# Patient Record
Sex: MALE | Race: WHITE | NOT HISPANIC OR LATINO | Employment: OTHER | URBAN - METROPOLITAN AREA
[De-identification: names, ages, dates, MRNs, and addresses within clinical notes are randomized per-mention and may not be internally consistent; named-entity substitution may affect disease eponyms.]

---

## 2017-01-25 RX ORDER — ATENOLOL 50 MG/1
TABLET ORAL
Qty: 180 TABLET | Refills: 0 | Status: SHIPPED | OUTPATIENT
Start: 2017-01-25 | End: 2017-01-30 | Stop reason: SDUPTHER

## 2017-01-30 ENCOUNTER — OFFICE VISIT (OUTPATIENT)
Dept: FAMILY MEDICINE CLINIC | Facility: CLINIC | Age: 72
End: 2017-01-30

## 2017-01-30 VITALS
BODY MASS INDEX: 38.73 KG/M2 | HEART RATE: 52 BPM | TEMPERATURE: 98.1 F | HEIGHT: 73 IN | DIASTOLIC BLOOD PRESSURE: 70 MMHG | SYSTOLIC BLOOD PRESSURE: 120 MMHG | WEIGHT: 292.2 LBS

## 2017-01-30 DIAGNOSIS — I65.21 CAROTID STENOSIS, ASYMPTOMATIC, RIGHT: ICD-10-CM

## 2017-01-30 DIAGNOSIS — I35.0 AORTIC STENOSIS, MODERATE: Primary | ICD-10-CM

## 2017-01-30 DIAGNOSIS — I10 ESSENTIAL HYPERTENSION: ICD-10-CM

## 2017-01-30 DIAGNOSIS — E11.8 TYPE 2 DIABETES MELLITUS WITH COMPLICATION, WITHOUT LONG-TERM CURRENT USE OF INSULIN (HCC): ICD-10-CM

## 2017-01-30 PROBLEM — I65.29 CAROTID STENOSIS, ASYMPTOMATIC: Status: ACTIVE | Noted: 2017-01-30

## 2017-01-30 PROCEDURE — 99213 OFFICE O/P EST LOW 20 MIN: CPT | Performed by: FAMILY MEDICINE

## 2017-01-30 RX ORDER — GLIPIZIDE 5 MG/1
TABLET ORAL
Qty: 270 TABLET | Refills: 0 | Status: SHIPPED | OUTPATIENT
Start: 2017-01-30 | End: 2017-06-19 | Stop reason: SDUPTHER

## 2017-01-30 RX ORDER — ATORVASTATIN CALCIUM 40 MG/1
40 TABLET, FILM COATED ORAL DAILY
Qty: 90 TABLET | Refills: 0 | Status: SHIPPED | OUTPATIENT
Start: 2017-01-30 | End: 2017-06-19 | Stop reason: SDUPTHER

## 2017-01-30 RX ORDER — LISINOPRIL 2.5 MG/1
2.5 TABLET ORAL DAILY
Qty: 90 TABLET | Refills: 0 | Status: SHIPPED | OUTPATIENT
Start: 2017-01-30 | End: 2017-06-19 | Stop reason: SDUPTHER

## 2017-01-30 RX ORDER — METFORMIN HYDROCHLORIDE 750 MG/1
750 TABLET, EXTENDED RELEASE ORAL 2 TIMES DAILY
Qty: 180 TABLET | Refills: 0 | Status: SHIPPED | OUTPATIENT
Start: 2017-01-30 | End: 2017-06-19 | Stop reason: SDUPTHER

## 2017-01-30 RX ORDER — TRIAMTERENE AND HYDROCHLOROTHIAZIDE 37.5; 25 MG/1; MG/1
2 CAPSULE ORAL EVERY MORNING
Qty: 180 CAPSULE | Refills: 0 | Status: SHIPPED | OUTPATIENT
Start: 2017-01-30 | End: 2017-06-19 | Stop reason: SDUPTHER

## 2017-01-30 RX ORDER — CLOPIDOGREL BISULFATE 75 MG/1
75 TABLET ORAL DAILY
Qty: 90 TABLET | Refills: 0 | Status: SHIPPED | OUTPATIENT
Start: 2017-01-30 | End: 2017-06-19 | Stop reason: SDUPTHER

## 2017-01-30 RX ORDER — ATENOLOL 50 MG/1
50 TABLET ORAL 2 TIMES DAILY
Qty: 180 TABLET | Refills: 0 | Status: SHIPPED | OUTPATIENT
Start: 2017-01-30 | End: 2017-07-10 | Stop reason: SDUPTHER

## 2017-01-30 NOTE — PROGRESS NOTES
Chief Complaint   Patient presents with   • Diabetes     follow up       Subjective.../HPI  Patient present today with dm. Has seen dr mcknight and goes back in march for fluid on lungs. Had thoracentesis and removed abut 2 liters.    I have reviewed the patient's medical history in detail and updated the computerized patient record.    Family History   Problem Relation Age of Onset   • Hypertension Father        Social History     Social History   • Marital status:      Spouse name: N/A   • Number of children: N/A   • Years of education: N/A     Occupational History   • Not on file.     Social History Main Topics   • Smoking status: Never Smoker   • Smokeless tobacco: Never Used   • Alcohol use Yes      Comment: socially   • Drug use: No   • Sexual activity: Not on file     Other Topics Concern   • Not on file     Social History Narrative       Review of Systems:   Review of Systems   Constitutional: Negative for chills, fatigue, fever and unexpected weight change.   HENT: Negative for ear pain, hearing loss, sinus pressure, sore throat and tinnitus.    Eyes: Negative for pain, discharge and redness.   Respiratory: Negative for cough, shortness of breath and wheezing.    Cardiovascular: Negative for chest pain, palpitations and leg swelling.   Gastrointestinal: Negative for abdominal pain, constipation, diarrhea and nausea.   Endocrine: Negative for cold intolerance and heat intolerance.   Genitourinary: Negative for difficulty urinating, flank pain and urgency.   Musculoskeletal: Negative for back pain, joint swelling and myalgias.   Skin: Negative for rash and wound.   Allergic/Immunologic: Negative for environmental allergies and food allergies.   Neurological: Negative for dizziness, seizures, numbness and headaches.   Hematological: Negative for adenopathy. Does not bruise/bleed easily.   Psychiatric/Behavioral: Negative for decreased concentration, dysphoric mood and sleep disturbance. The patient is  "not nervous/anxious.    All other systems reviewed and are negative.      Objective:  Vital Signs     Vitals:    01/30/17 1126 01/30/17 1209   BP: 144/64 120/70   BP Location: Left arm Left arm   Patient Position: Sitting Sitting   Cuff Size:  Large Adult   Pulse: 52    Temp: 98.1 °F (36.7 °C)    TempSrc: Oral    Weight: 292 lb 3.2 oz (133 kg)    Height: 73\" (185.4 cm)      Physical Exam   Constitutional: He is oriented to person, place, and time. He appears well-developed and well-nourished.   HENT:   Head: Normocephalic.   Eyes: Pupils are equal, round, and reactive to light.   Neck: Normal range of motion.   Right carotid bruit vs transmitted sound   Cardiovascular: Normal rate and regular rhythm.    Murmur (heart murmur 3/6) heard.  Pulmonary/Chest: Effort normal and breath sounds normal.   Abdominal: Soft. Bowel sounds are normal.   obesity   Musculoskeletal: Normal range of motion.   Neurological: He is alert and oriented to person, place, and time.   Skin: Skin is warm and dry.        Results Review:      REVIEWED AND DISCUSSED CLINICAL RESULTS WITH PATIENT                          Current Outpatient Prescriptions:   •  atenolol (TENORMIN) 50 MG tablet, Take 1 tablet by mouth 2 (Two) Times a Day., Disp: 180 tablet, Rfl: 0  •  atorvastatin (LIPITOR) 40 MG tablet, Take 1 tablet by mouth Daily., Disp: 90 tablet, Rfl: 0  •  clopidogrel (PLAVIX) 75 MG tablet, Take 1 tablet by mouth Daily., Disp: 90 tablet, Rfl: 0  •  glipiZIDE (GLUCOTROL) 5 MG tablet, 2 tabs in am and 1 at 4 pm, Disp: 270 tablet, Rfl: 0  •  glucose blood test strip, accu-check amanda plus test strips  Test twice daily, Disp: 200 each, Rfl: 1  •  lisinopril (PRINIVIL,ZESTRIL) 2.5 MG tablet, Take 1 tablet by mouth Daily., Disp: 90 tablet, Rfl: 0  •  metFORMIN ER (GLUCOPHAGE-XR) 750 MG 24 hr tablet, Take 1 tablet by mouth 2 (Two) Times a Day., Disp: 180 tablet, Rfl: 0  •  triamterene-hydrochlorothiazide (DYAZIDE) 37.5-25 MG per capsule, Take 2 " capsules by mouth Every Morning., Disp: 180 capsule, Rfl: 0  •  VENTOLIN  (90 BASE) MCG/ACT inhaler, , Disp: , Rfl:     Procedures    Assessment/Plan     Diagnoses and all orders for this visit:    Aortic stenosis, moderate    Carotid stenosis, asymptomatic, right    Essential hypertension    Type 2 diabetes mellitus with complication, without long-term current use of insulin    Other orders  -     VENTOLIN  (90 BASE) MCG/ACT inhaler;   -     atenolol (TENORMIN) 50 MG tablet; Take 1 tablet by mouth 2 (Two) Times a Day.  -     atorvastatin (LIPITOR) 40 MG tablet; Take 1 tablet by mouth Daily.  -     clopidogrel (PLAVIX) 75 MG tablet; Take 1 tablet by mouth Daily.  -     glipiZIDE (GLUCOTROL) 5 MG tablet; 2 tabs in am and 1 at 4 pm  -     lisinopril (PRINIVIL,ZESTRIL) 2.5 MG tablet; Take 1 tablet by mouth Daily.  -     metFORMIN ER (GLUCOPHAGE-XR) 750 MG 24 hr tablet; Take 1 tablet by mouth 2 (Two) Times a Day.  -     triamterene-hydrochlorothiazide (DYAZIDE) 37.5-25 MG per capsule; Take 2 capsules by mouth Every Morning.  -     glucose blood test strip; accu-check amanda plus test strips   Test twice daily         Martín Oliveira Jr., MD  01/30/17  12:24 PM

## 2017-01-30 NOTE — MR AVS SNAPSHOT
Rock Maciel   1/30/2017 11:10 AM   Office Visit    Dept Phone:  [phone]   Encounter #:  [id number]    Provider:  Martín Oliveira Jr., MD   Department:  [facility]                Your Full Care Plan              Today's Medication Changes          These changes are accurate as of: 1/30/17 12:31 PM.  If you have any questions, ask your nurse or doctor.               Medication(s)that have changed:     atenolol 50 MG tablet   Commonly known as:  TENORMIN   Take 1 tablet by mouth 2 (Two) Times a Day.   What changed:  See the new instructions.   Changed by:  Martín Oliveira Jr., MD         Stop taking medication(s)listed here:     allopurinol 300 MG tablet   Commonly known as:  ZYLOPRIM   Stopped by:  Martín Oliveira Jr., MD                Where to Get Your Medications      These medications were sent to [facility] - [place], [place] - [address] [place] [address] - [phone] [facility] [phone] [address] [address]     Phone:  [phone]     atenolol 50 MG tablet    atorvastatin 40 MG tablet    clopidogrel 75 MG tablet    glipiZIDE 5 MG tablet    lisinopril 2.5 MG tablet    metFORMIN  MG 24 hr tablet    triamterene-hydrochlorothiazide 37.5-25 MG per capsule         You can get these medications from any pharmacy     Bring a paper prescription for each of these medications     glucose blood test strip                  Your Updated Medication List          This list is accurate as of: 1/30/17 12:31 PM.  Always use your most recent med list.                atenolol 50 MG tablet   Commonly known as:  TENORMIN   Take 1 tablet by mouth 2 (Two) Times a Day.       atorvastatin 40 MG tablet   Commonly known as:  LIPITOR   Take 1 tablet by mouth Daily.       clopidogrel 75 MG tablet   Commonly known as:  PLAVIX   Take 1 tablet by mouth Daily.       glipiZIDE 5 MG tablet   Commonly known as:   GLUCOTROL   2 tabs in am and 1 at 4 pm       glucose blood test strip   accu-check amanda plus test strips  Test twice daily       lisinopril 2.5 MG tablet   Commonly known as:  PRINIVIL,ZESTRIL   Take 1 tablet by mouth Daily.       metFORMIN  MG 24 hr tablet   Commonly known as:  GLUCOPHAGE-XR   Take 1 tablet by mouth 2 (Two) Times a Day.       triamterene-hydrochlorothiazide 37.5-25 MG per capsule   Commonly known as:  DYAZIDE   Take 2 capsules by mouth Every Morning.       VENTOLIN  (90 BASE) MCG/ACT inhaler   Generic drug:  albuterol               You Were Diagnosed With        Codes Comments    Aortic stenosis, moderate    -  Primary ICD-10-CM: I35.0  ICD-9-CM: 424.1     Carotid stenosis, asymptomatic, right     ICD-10-CM: I65.21  ICD-9-CM: 433.10     Essential hypertension     ICD-10-CM: I10  ICD-9-CM: 401.9     Type 2 diabetes mellitus with complication, without long-term current use of insulin     ICD-10-CM: E11.8  ICD-9-CM: 250.90       Instructions     None    Patient Instructions History      Upcoming Appointments     Visit Type Date Time Department    OFFICE VISIT 2017 11:10 AM [facility]     [facility] ECHO 2D COMPLETE VT 2017 10:15 AM  [facility]    OFFICE VISIT 2017 11:00 AM [facility]    FOLLOW UP SLEEP CLINIC 2017  8:30 AM  [facility]      [facility] Signup     [facility] allows you to send messages to your doctor, view your test results, renew your prescriptions, schedule appointments, and more. To sign up, go to [employer] and click on the Sign Up Now link in the New User? box. Enter your [facility] [facility] Activation Code exactly as it appears below along with the last four digits of your Social Security Number and your Date of Birth () to complete the sign-up process. If you do not sign up before the expiration date, you must request a new code.    [facility] [facility] Activation Code: [email or web address]  Expires: 2017 12:31 PM    If you  "have questions, you can email [email or web address] or call [phone] to talk to our MyChart staff. Remember, [employer] is NOT to be used for urgent needs. For medical emergencies, dial 911.               Other Info from Your Visit           Your Appointments     May 02, 2017 10:15 AM EDT   ECHOCARDIOGRAM 2D COMPLETE VISIT with [facility]   [facility] ([place])    [address] [address] [phone]           Bring your insurance cards with you. Wear comfortable clothing and shoes.            May 02, 2017 11:00 AM EDT   Office Visit with Azam Banegas Jr., MD   [facility] (--)    [facility]. [address] [place] [address] [phone]           Arrive 15 minutes prior to appointment.            Aug 08, 2017  8:30 AM EDT   Follow Up Sleep Clinic with TOSHIA JIMENEZ SLEEP LAB PROVIDER SCHEDULE   [facility] ([place])    [address] [facility] [address] [phone]            1.  If you are currently on a CPAP or BiPAP please bring in your SD card or memory card.  2.  If you are experiencing problems with your current mask, please bring your mask with you to your appointment.                Allergies     Ceclor [Cefaclor] Allergy Rash      Reason for Visit     Diabetes follow up      Vital Signs     Blood Pressure Pulse Temperature Height Weight Body Mass Index    120/70 (BP Location: Left arm, Patient Position: Sitting, Cuff Size: Large Adult) 52 98.1 °F (36.7 °C) (Oral) 73\" (185.4 cm) 292 lb 3.2 oz (133 kg) 38.55 kg/m2    Smoking Status                   Never Smoker           Problems and Diagnoses Noted     Aortic stenosis, moderate    High blood pressure    Narrowing of artery in neck    Diabetes        "

## 2017-03-07 ENCOUNTER — TRANSCRIBE ORDERS (OUTPATIENT)
Dept: ADMINISTRATIVE | Facility: HOSPITAL | Age: 72
End: 2017-03-07

## 2017-03-07 DIAGNOSIS — J90 PLEURAL EFFUSION: Primary | ICD-10-CM

## 2017-03-14 ENCOUNTER — HOSPITAL ENCOUNTER (OUTPATIENT)
Dept: CT IMAGING | Facility: HOSPITAL | Age: 72
Discharge: HOME | End: 2017-03-14
Attending: INTERNAL MEDICINE | Admitting: INTERNAL MEDICINE

## 2017-03-14 ENCOUNTER — HOSPITAL ENCOUNTER (OUTPATIENT)
Dept: ULTRASOUND IMAGING | Facility: HOSPITAL | Age: 72
Discharge: HOME | End: 2017-03-14
Attending: INTERNAL MEDICINE

## 2017-03-14 VITALS
HEIGHT: 73 IN | BODY MASS INDEX: 37.77 KG/M2 | SYSTOLIC BLOOD PRESSURE: 108 MMHG | TEMPERATURE: 97 F | OXYGEN SATURATION: 99 % | DIASTOLIC BLOOD PRESSURE: 67 MMHG | HEART RATE: 52 BPM | RESPIRATION RATE: 18 BRPM | WEIGHT: 285 LBS

## 2017-03-14 DIAGNOSIS — J90 PLEURAL EFFUSION: ICD-10-CM

## 2017-03-14 LAB
APPEARANCE FLD: CLEAR
COLOR FLD: YELLOW
EOSINOPHIL NFR FLD MANUAL: 14 %
GLUCOSE FLD-MCNC: 104 MG/DL
LDH FLD-CCNC: 144 U/L
LYMPHOCYTES NFR FLD MANUAL: 68 %
METHOD: NORMAL
MONOCYTES NFR FLD: 11 %
MONOS+MACROS NFR FLD: 6 %
NEUTROPHILS NFR FLD MANUAL: 1 %
NUC CELL # FLD: 318 /MM3
PH FLD: 8 [PH]
PROT FLD-MCNC: 4.4 G/DL
RBC # FLD AUTO: 1447 /MM3

## 2017-03-14 PROCEDURE — 89051 BODY FLUID CELL COUNT: CPT | Performed by: INTERNAL MEDICINE

## 2017-03-14 PROCEDURE — 71250 CT THORAX DX C-: CPT

## 2017-03-14 PROCEDURE — A9270 NON-COVERED ITEM OR SERVICE: HCPCS | Performed by: RADIOLOGY

## 2017-03-14 PROCEDURE — 76942 ECHO GUIDE FOR BIOPSY: CPT

## 2017-03-14 PROCEDURE — 87070 CULTURE OTHR SPECIMN AEROBIC: CPT | Performed by: INTERNAL MEDICINE

## 2017-03-14 PROCEDURE — 84157 ASSAY OF PROTEIN OTHER: CPT | Performed by: INTERNAL MEDICINE

## 2017-03-14 PROCEDURE — 83986 ASSAY PH BODY FLUID NOS: CPT | Performed by: INTERNAL MEDICINE

## 2017-03-14 PROCEDURE — 83615 LACTATE (LD) (LDH) ENZYME: CPT | Performed by: INTERNAL MEDICINE

## 2017-03-14 PROCEDURE — 88112 CYTOPATH CELL ENHANCE TECH: CPT | Performed by: INTERNAL MEDICINE

## 2017-03-14 PROCEDURE — 82945 GLUCOSE OTHER FLUID: CPT | Performed by: INTERNAL MEDICINE

## 2017-03-14 PROCEDURE — 87205 SMEAR GRAM STAIN: CPT | Performed by: INTERNAL MEDICINE

## 2017-03-14 PROCEDURE — 88305 TISSUE EXAM BY PATHOLOGIST: CPT | Performed by: INTERNAL MEDICINE

## 2017-03-14 PROCEDURE — 63710000001 ALPRAZOLAM 1 MG TABLET: Performed by: RADIOLOGY

## 2017-03-14 PROCEDURE — 87015 SPECIMEN INFECT AGNT CONCNTJ: CPT | Performed by: INTERNAL MEDICINE

## 2017-03-14 RX ORDER — ALPRAZOLAM 1 MG/1
1 TABLET ORAL ONCE
Status: COMPLETED | OUTPATIENT
Start: 2017-03-14 | End: 2017-03-14

## 2017-03-14 RX ADMIN — ALPRAZOLAM 1 MG: 1 TABLET ORAL at 10:45

## 2017-03-14 RX ADMIN — SODIUM BICARBONATE 10 ML: 84 INJECTION, SOLUTION INTRAVENOUS at 11:23

## 2017-03-15 ENCOUNTER — TELEPHONE (OUTPATIENT)
Dept: INTERVENTIONAL RADIOLOGY/VASCULAR | Facility: HOSPITAL | Age: 72
End: 2017-03-15

## 2017-03-16 LAB
CYTO UR: NORMAL
LAB AP CASE REPORT: NORMAL
Lab: NORMAL
PATH REPORT.FINAL DX SPEC: NORMAL
PATH REPORT.GROSS SPEC: NORMAL

## 2017-03-19 LAB
BACTERIA FLD CULT: NORMAL
GRAM STN SPEC: NORMAL
GRAM STN SPEC: NORMAL

## 2017-03-30 ENCOUNTER — TRANSCRIBE ORDERS (OUTPATIENT)
Dept: ADMINISTRATIVE | Facility: HOSPITAL | Age: 72
End: 2017-03-30

## 2017-03-30 DIAGNOSIS — N28.9 RENAL LESION: Primary | ICD-10-CM

## 2017-04-04 ENCOUNTER — HOSPITAL ENCOUNTER (OUTPATIENT)
Dept: ULTRASOUND IMAGING | Facility: HOSPITAL | Age: 72
Discharge: HOME | End: 2017-04-04
Attending: INTERNAL MEDICINE | Admitting: INTERNAL MEDICINE

## 2017-04-04 ENCOUNTER — OFFICE VISIT (OUTPATIENT)
Dept: SURGERY | Facility: CLINIC | Age: 72
End: 2017-04-04

## 2017-04-04 VITALS
DIASTOLIC BLOOD PRESSURE: 90 MMHG | HEIGHT: 73 IN | OXYGEN SATURATION: 94 % | HEART RATE: 57 BPM | BODY MASS INDEX: 40.29 KG/M2 | WEIGHT: 304 LBS | SYSTOLIC BLOOD PRESSURE: 152 MMHG

## 2017-04-04 DIAGNOSIS — N28.9 RENAL LESION: ICD-10-CM

## 2017-04-04 DIAGNOSIS — J90 PLEURAL EFFUSION, RIGHT: Primary | ICD-10-CM

## 2017-04-04 DIAGNOSIS — I35.0 AORTIC STENOSIS, MODERATE: ICD-10-CM

## 2017-04-04 PROCEDURE — 99213 OFFICE O/P EST LOW 20 MIN: CPT | Performed by: THORACIC SURGERY (CARDIOTHORACIC VASCULAR SURGERY)

## 2017-04-04 PROCEDURE — 76775 US EXAM ABDO BACK WALL LIM: CPT

## 2017-04-04 NOTE — PROGRESS NOTES
Subjective   Patient ID: Rock Maciel is a 71 y.o. male is here today for follow-up.    History of Present Illness  Dear Colleagues,   Rock Maciel is here today at the request of Dr. Castillo of pulmonary medicine.  I have reviewed his history, his most recent CT scan and have examined him.  Mr. Maciel was seen here in the office about 2 months ago. We evaluated him for a recurrent pleural effusion.  I felt that this was related to a combination of congestive heart failure and renal insufficiency.  We elected to just treat him with serial thoracentesis.  Unfortunately the effusion continues to recur.  Despite this effusion his symptoms are minimal. This may be due to the fact that he is fairly sedentary and because of problems with his heart and his lower extremities.  He had a repeat thoracentesis March 14 which yielded almost 2 L of fluid.  Subsequent CT scan of the chest shows hydropneumothorax.  He is here today for further evaluation.      The following portions of the patient's history were reviewed and updated as appropriate: allergies, current medications, past family history, past medical history, past social history, past surgical history and problem list.  Review of Systems   Constitution: Positive for weight gain.   HENT: Negative.    Eyes: Negative.    Cardiovascular: Positive for dyspnea on exertion and leg swelling.   Respiratory: Positive for shortness of breath.    Endocrine: Negative.    Hematologic/Lymphatic: Negative.    Skin: Negative.    Musculoskeletal: Positive for gout, joint pain and muscle cramps.   Gastrointestinal: Negative.    Genitourinary: Negative.    Neurological: Positive for numbness.   Psychiatric/Behavioral: Negative.         Objective   Physical Exam   Constitutional: He is oriented to person, place, and time. Vital signs are normal. He appears well-developed.   HENT:   Head: Normocephalic and atraumatic.   Neck: Normal range of motion. Neck supple.   Cardiovascular: Normal  rate, regular rhythm and intact distal pulses.    Murmur heard.   Systolic murmur is present with a grade of 2/6   Bilateral lower extremity swelling   Pulmonary/Chest: Effort normal. He has decreased breath sounds in the right middle field and the right lower field. He has no wheezes. He has no rhonchi. He has no rales. He exhibits no tenderness.   Abdominal: Soft. There is no tenderness.   Musculoskeletal: Normal range of motion. He exhibits no edema or tenderness.   Neurological: He is alert and oriented to person, place, and time. He has normal strength.   Skin: Skin is warm and dry. No rash noted. No cyanosis or erythema.   Psychiatric: He has a normal mood and affect. His behavior is normal.       Assessment/Plan   Independent Review of Radiographic Studies:    CT scan of the chest performed March 14 was independently reviewed.  There is a right-sided hydropneumothorax.  There is opacity in the posterior aspect of the right lower lobe that measures 5.3 x 2.1 cm. There is some atelectasis and/or mass in the right middle lobe.  Left lung is clear.  Upper abdomen shows asymmetric lobulation left upper kidney that measures 3.7 cm.  Is a mild hiatal hernia.      This gentleman has recurrent right pleural effusion.  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  I have recommended to him that he have a right VAT with decortication of the right lung.  I explained the procedure as well as the risks and benefits.  I have answered all his questions.  He has requested that we proceed.  Arrangements are being made to do this procedure at [facility] in the near future.  I will keep you informed of his progress.  Thank you for allowing me to participate in the care Mr. Maciel.    Diagnoses and all orders for this visit:    Pleural effusion, right  -     Case request    Aortic stenosis, moderate

## 2017-04-04 NOTE — PROGRESS NOTES
Subjective   Patient ID: Rock Maciel is a 71 y.o. male {Specialty - why patient here?:[id number]}    History of Present Illness  Dear Colleagues,   Rock Maciel is here today for their {FIRST SECOND THIRD:01253} postoperative visit after their surgery for ***.  He denies any complaints of fever, chills, cough, hemoptysis, pleuritic chest pain, shortness of air, dyspnea with exertion, night sweats, hoarseness, or unintentional weight loss.  He has no other somatic complaints.    The following portions of the patient's history were reviewed and updated as appropriate: allergies, current medications, past family history, past medical history, past social history, past surgical history and problem list.  ROS     Objective   Physical Exam    Assessment/Plan   Independent Review of Radiographic Studies:    ***  Assessment:    Plan:    Diagnoses and all orders for this visit:    Pleural effusion, right  -     Case request    Aortic stenosis, moderate

## 2017-04-06 ENCOUNTER — TELEPHONE (OUTPATIENT)
Dept: CARDIOLOGY | Facility: CLINIC | Age: 72
End: 2017-04-06

## 2017-04-06 DIAGNOSIS — I35.0 NONRHEUMATIC AORTIC VALVE STENOSIS: Primary | ICD-10-CM

## 2017-04-06 NOTE — TELEPHONE ENCOUNTER
Surgery:  right VAT with decortication of the right lung    *Per Dr. Orlando OV note from 4/4/2017  aw

## 2017-04-07 NOTE — TELEPHONE ENCOUNTER
We'll need echocardiogram in late April 2017 prior to surgery so that I can clear him.  He was brisk and an echo in 6 months for following his aortic stenosis.  This will be done a month sooner

## 2017-05-01 ENCOUNTER — APPOINTMENT (OUTPATIENT)
Dept: PREADMISSION TESTING | Facility: HOSPITAL | Age: 72
End: 2017-05-01

## 2017-05-01 VITALS
BODY MASS INDEX: 40.69 KG/M2 | RESPIRATION RATE: 16 BRPM | WEIGHT: 307 LBS | DIASTOLIC BLOOD PRESSURE: 78 MMHG | HEIGHT: 73 IN | OXYGEN SATURATION: 95 % | SYSTOLIC BLOOD PRESSURE: 170 MMHG | HEART RATE: 58 BPM | TEMPERATURE: 96.8 F

## 2017-05-01 LAB
ABO GROUP BLD: NORMAL
ANION GAP SERPL CALCULATED.3IONS-SCNC: 12.4 MMOL/L
APTT PPP: 30.6 SECONDS (ref 22.7–35.4)
BACTERIA UR QL AUTO: NORMAL /HPF
BILIRUB UR QL STRIP: NEGATIVE
BLD GP AB SCN SERPL QL: NEGATIVE
BUN BLD-MCNC: 45 MG/DL (ref 8–23)
BUN/CREAT SERPL: 29 (ref 7–25)
CALCIUM SPEC-SCNC: 9.6 MG/DL (ref 8.6–10.5)
CHLORIDE SERPL-SCNC: 101 MMOL/L (ref 98–107)
CLARITY UR: CLEAR
CO2 SERPL-SCNC: 30.6 MMOL/L (ref 22–29)
COLOR UR: YELLOW
CREAT BLD-MCNC: 1.55 MG/DL (ref 0.76–1.27)
DEPRECATED RDW RBC AUTO: 44.4 FL (ref 37–54)
ERYTHROCYTE [DISTWIDTH] IN BLOOD BY AUTOMATED COUNT: 13.4 % (ref 11.5–14.5)
GFR SERPL CREATININE-BSD FRML MDRD: 44 ML/MIN/1.73
GLUCOSE BLD-MCNC: 108 MG/DL (ref 65–99)
GLUCOSE UR STRIP-MCNC: NEGATIVE MG/DL
HCT VFR BLD AUTO: 37.9 % (ref 40.4–52.2)
HGB BLD-MCNC: 12.2 G/DL (ref 13.7–17.6)
HGB UR QL STRIP.AUTO: NEGATIVE
HYALINE CASTS UR QL AUTO: NORMAL /LPF
INR PPP: 1.08 (ref 0.9–1.1)
KETONES UR QL STRIP: NEGATIVE
LEUKOCYTE ESTERASE UR QL STRIP.AUTO: NEGATIVE
MCH RBC QN AUTO: 29.7 PG (ref 27–32.7)
MCHC RBC AUTO-ENTMCNC: 32.2 G/DL (ref 32.6–36.4)
MCV RBC AUTO: 92.2 FL (ref 79.8–96.2)
NITRITE UR QL STRIP: NEGATIVE
PH UR STRIP.AUTO: 8.5 [PH] (ref 5–8)
PLATELET # BLD AUTO: 202 10*3/MM3 (ref 140–500)
PMV BLD AUTO: 10.2 FL (ref 6–12)
POTASSIUM BLD-SCNC: 4.8 MMOL/L (ref 3.5–5.2)
PROT UR QL STRIP: ABNORMAL
PROTHROMBIN TIME: 13.6 SECONDS (ref 11.7–14.2)
RBC # BLD AUTO: 4.11 10*6/MM3 (ref 4.6–6)
RBC # UR: NORMAL /HPF
REF LAB TEST METHOD: NORMAL
RH BLD: POSITIVE
SODIUM BLD-SCNC: 144 MMOL/L (ref 136–145)
SP GR UR STRIP: 1.01 (ref 1–1.03)
SQUAMOUS #/AREA URNS HPF: NORMAL /HPF
UROBILINOGEN UR QL STRIP: ABNORMAL
WBC NRBC COR # BLD: 6.51 10*3/MM3 (ref 4.5–10.7)
WBC UR QL AUTO: NORMAL /HPF

## 2017-05-01 PROCEDURE — 80048 BASIC METABOLIC PNL TOTAL CA: CPT | Performed by: THORACIC SURGERY (CARDIOTHORACIC VASCULAR SURGERY)

## 2017-05-01 PROCEDURE — 85610 PROTHROMBIN TIME: CPT | Performed by: THORACIC SURGERY (CARDIOTHORACIC VASCULAR SURGERY)

## 2017-05-01 PROCEDURE — 36415 COLL VENOUS BLD VENIPUNCTURE: CPT

## 2017-05-01 PROCEDURE — 85730 THROMBOPLASTIN TIME PARTIAL: CPT | Performed by: THORACIC SURGERY (CARDIOTHORACIC VASCULAR SURGERY)

## 2017-05-01 PROCEDURE — 86901 BLOOD TYPING SEROLOGIC RH(D): CPT | Performed by: THORACIC SURGERY (CARDIOTHORACIC VASCULAR SURGERY)

## 2017-05-01 PROCEDURE — 86850 RBC ANTIBODY SCREEN: CPT | Performed by: THORACIC SURGERY (CARDIOTHORACIC VASCULAR SURGERY)

## 2017-05-01 PROCEDURE — 86900 BLOOD TYPING SEROLOGIC ABO: CPT | Performed by: THORACIC SURGERY (CARDIOTHORACIC VASCULAR SURGERY)

## 2017-05-01 PROCEDURE — 81001 URINALYSIS AUTO W/SCOPE: CPT | Performed by: THORACIC SURGERY (CARDIOTHORACIC VASCULAR SURGERY)

## 2017-05-01 PROCEDURE — 85027 COMPLETE CBC AUTOMATED: CPT | Performed by: THORACIC SURGERY (CARDIOTHORACIC VASCULAR SURGERY)

## 2017-05-01 RX ORDER — GLIPIZIDE 5 MG/1
5 TABLET ORAL EVERY EVENING
COMMUNITY
End: 2017-05-30 | Stop reason: SDUPTHER

## 2017-05-01 RX ORDER — CHLORAL HYDRATE 500 MG
1000 CAPSULE ORAL
COMMUNITY
End: 2019-10-11

## 2017-05-01 RX ORDER — MULTIVIT WITH MINERALS/LUTEIN
250 TABLET ORAL DAILY
COMMUNITY

## 2017-05-02 ENCOUNTER — HOSPITAL ENCOUNTER (OUTPATIENT)
Dept: CARDIOLOGY | Facility: HOSPITAL | Age: 72
Discharge: HOME | End: 2017-05-02
Attending: INTERNAL MEDICINE | Admitting: INTERNAL MEDICINE

## 2017-05-02 ENCOUNTER — OFFICE VISIT (OUTPATIENT)
Dept: CARDIOLOGY | Facility: CLINIC | Age: 72
End: 2017-05-02

## 2017-05-02 VITALS
HEART RATE: 55 BPM | WEIGHT: 306 LBS | SYSTOLIC BLOOD PRESSURE: 166 MMHG | DIASTOLIC BLOOD PRESSURE: 71 MMHG | BODY MASS INDEX: 40.56 KG/M2 | HEIGHT: 73 IN

## 2017-05-02 DIAGNOSIS — I87.2 CHRONIC VENOUS INSUFFICIENCY: ICD-10-CM

## 2017-05-02 DIAGNOSIS — I65.23 CAROTID STENOSIS, ASYMPTOMATIC, BILATERAL: ICD-10-CM

## 2017-05-02 DIAGNOSIS — M14.60 NEUROPATHIC ARTHROPATHY: ICD-10-CM

## 2017-05-02 DIAGNOSIS — I44.4 LAFB (LEFT ANTERIOR FASCICULAR BLOCK): ICD-10-CM

## 2017-05-02 DIAGNOSIS — I35.0 NONRHEUMATIC AORTIC VALVE STENOSIS: ICD-10-CM

## 2017-05-02 DIAGNOSIS — R01.1 HEART MURMUR: Primary | ICD-10-CM

## 2017-05-02 DIAGNOSIS — R63.5 GAIN OF WEIGHT: ICD-10-CM

## 2017-05-02 DIAGNOSIS — E66.01 MORBID OBESITY DUE TO EXCESS CALORIES (HCC): ICD-10-CM

## 2017-05-02 DIAGNOSIS — I10 ESSENTIAL HYPERTENSION: ICD-10-CM

## 2017-05-02 DIAGNOSIS — G47.33 OBSTRUCTIVE APNEA: ICD-10-CM

## 2017-05-02 DIAGNOSIS — I25.10 CAD IN NATIVE ARTERY: ICD-10-CM

## 2017-05-02 DIAGNOSIS — I10 HTN (HYPERTENSION), BENIGN: ICD-10-CM

## 2017-05-02 DIAGNOSIS — I45.10 BUNDLE BRANCH BLOCK, RIGHT: ICD-10-CM

## 2017-05-02 DIAGNOSIS — J90 PLEURAL EFFUSION, RIGHT: ICD-10-CM

## 2017-05-02 LAB
BH CV ECHO MEAS - ACS: 1.5 CM
BH CV ECHO MEAS - AI DEC SLOPE: 175 CM/SEC^2
BH CV ECHO MEAS - AI MAX PG: 56 MMHG
BH CV ECHO MEAS - AI MAX VEL: 374 CM/SEC
BH CV ECHO MEAS - AI P1/2T: 626 MSEC
BH CV ECHO MEAS - AO MAX PG (FULL): 48.4 MMHG
BH CV ECHO MEAS - AO MAX PG: 51.3 MMHG
BH CV ECHO MEAS - AO MEAN PG (FULL): 31 MMHG
BH CV ECHO MEAS - AO MEAN PG: 33 MMHG
BH CV ECHO MEAS - AO ROOT AREA (BSA CORRECTED): 1.2
BH CV ECHO MEAS - AO ROOT AREA: 7.5 CM^2
BH CV ECHO MEAS - AO ROOT DIAM: 3.1 CM
BH CV ECHO MEAS - AO V2 MAX: 358 CM/SEC
BH CV ECHO MEAS - AO V2 MEAN: 273 CM/SEC
BH CV ECHO MEAS - AO V2 VTI: 95.9 CM
BH CV ECHO MEAS - AVA(I,A): 0.88 CM^2
BH CV ECHO MEAS - AVA(I,D): 0.88 CM^2
BH CV ECHO MEAS - AVA(V,A): 0.81 CM^2
BH CV ECHO MEAS - AVA(V,D): 0.81 CM^2
BH CV ECHO MEAS - BSA(HAYCOCK): 2.7 M^2
BH CV ECHO MEAS - BSA: 2.6 M^2
BH CV ECHO MEAS - BZI_BMI: 40.5 KILOGRAMS/M^2
BH CV ECHO MEAS - BZI_METRIC_HEIGHT: 185.4 CM
BH CV ECHO MEAS - BZI_METRIC_WEIGHT: 139.3 KG
BH CV ECHO MEAS - CONTRAST EF 4CH: 68 ML/M^2
BH CV ECHO MEAS - EDV(CUBED): 132.7 ML
BH CV ECHO MEAS - EDV(MOD-SP4): 153 ML
BH CV ECHO MEAS - EDV(TEICH): 123.8 ML
BH CV ECHO MEAS - EF(CUBED): 64.8 %
BH CV ECHO MEAS - EF(MOD-SP4): 68 %
BH CV ECHO MEAS - EF(TEICH): 56 %
BH CV ECHO MEAS - ESV(CUBED): 46.7 ML
BH CV ECHO MEAS - ESV(MOD-SP4): 49 ML
BH CV ECHO MEAS - ESV(TEICH): 54.4 ML
BH CV ECHO MEAS - FS: 29.4 %
BH CV ECHO MEAS - IVS/LVPW: 1.1
BH CV ECHO MEAS - IVSD: 1.3 CM
BH CV ECHO MEAS - LA DIMENSION: 5.5 CM
BH CV ECHO MEAS - LA/AO: 1.8
BH CV ECHO MEAS - LV DIASTOLIC VOL/BSA (35-75): 59.3 ML/M^2
BH CV ECHO MEAS - LV MASS(C)D: 255.5 GRAMS
BH CV ECHO MEAS - LV MASS(C)DI: 99 GRAMS/M^2
BH CV ECHO MEAS - LV MAX PG: 2.8 MMHG
BH CV ECHO MEAS - LV MEAN PG: 2 MMHG
BH CV ECHO MEAS - LV SYSTOLIC VOL/BSA (12-30): 19 ML/M^2
BH CV ECHO MEAS - LV V1 MAX: 84.2 CM/SEC
BH CV ECHO MEAS - LV V1 MEAN: 64.8 CM/SEC
BH CV ECHO MEAS - LV V1 VTI: 24.5 CM
BH CV ECHO MEAS - LVIDD: 5.1 CM
BH CV ECHO MEAS - LVIDS: 3.6 CM
BH CV ECHO MEAS - LVLD AP4: 8.4 CM
BH CV ECHO MEAS - LVLS AP4: 7.1 CM
BH CV ECHO MEAS - LVOT AREA (M): 3.5 CM^2
BH CV ECHO MEAS - LVOT AREA: 3.5 CM^2
BH CV ECHO MEAS - LVOT DIAM: 2.1 CM
BH CV ECHO MEAS - LVPWD: 1.2 CM
BH CV ECHO MEAS - MV A DUR: 0.19 SEC
BH CV ECHO MEAS - MV A MAX VEL: 76.6 CM/SEC
BH CV ECHO MEAS - MV DEC SLOPE: 405 CM/SEC^2
BH CV ECHO MEAS - MV DEC TIME: 0.15 SEC
BH CV ECHO MEAS - MV E MAX VEL: 120 CM/SEC
BH CV ECHO MEAS - MV E/A: 1.6
BH CV ECHO MEAS - MV MAX PG: 7.6 MMHG
BH CV ECHO MEAS - MV MEAN PG: 2 MMHG
BH CV ECHO MEAS - MV P1/2T MAX VEL: 132 CM/SEC
BH CV ECHO MEAS - MV P1/2T: 95.5 MSEC
BH CV ECHO MEAS - MV V2 MAX: 138 CM/SEC
BH CV ECHO MEAS - MV V2 MEAN: 67.2 CM/SEC
BH CV ECHO MEAS - MV V2 VTI: 38.6 CM
BH CV ECHO MEAS - MVA P1/2T LCG: 1.7 CM^2
BH CV ECHO MEAS - MVA(P1/2T): 2.3 CM^2
BH CV ECHO MEAS - MVA(VTI): 2.2 CM^2
BH CV ECHO MEAS - PA MAX PG (FULL): 2.4 MMHG
BH CV ECHO MEAS - PA MAX PG: 3.8 MMHG
BH CV ECHO MEAS - PA V2 MAX: 97.7 CM/SEC
BH CV ECHO MEAS - PULM A REVS DUR: 0.14 SEC
BH CV ECHO MEAS - PULM A REVS VEL: 20.6 CM/SEC
BH CV ECHO MEAS - PULM DIAS VEL: 27.3 CM/SEC
BH CV ECHO MEAS - PULM S/D: 1.4
BH CV ECHO MEAS - PULM SYS VEL: 38 CM/SEC
BH CV ECHO MEAS - PVA(V,A): 2.5 CM^2
BH CV ECHO MEAS - PVA(V,D): 2.5 CM^2
BH CV ECHO MEAS - QP/QS: 0.78
BH CV ECHO MEAS - RAP SYSTOLE: 10 MMHG
BH CV ECHO MEAS - RV MAX PG: 1.4 MMHG
BH CV ECHO MEAS - RV MEAN PG: 1 MMHG
BH CV ECHO MEAS - RV V1 MAX: 59.9 CM/SEC
BH CV ECHO MEAS - RV V1 MEAN: 42.5 CM/SEC
BH CV ECHO MEAS - RV V1 VTI: 15.9 CM
BH CV ECHO MEAS - RVDD: 3 CM
BH CV ECHO MEAS - RVOT AREA: 4.2 CM^2
BH CV ECHO MEAS - RVOT DIAM: 2.3 CM
BH CV ECHO MEAS - RVSP: 44.6 MMHG
BH CV ECHO MEAS - SI(AO): 280.4 ML/M^2
BH CV ECHO MEAS - SI(CUBED): 33.3 ML/M^2
BH CV ECHO MEAS - SI(LVOT): 32.9 ML/M^2
BH CV ECHO MEAS - SI(MOD-SP4): 40.3 ML/M^2
BH CV ECHO MEAS - SI(TEICH): 26.9 ML/M^2
BH CV ECHO MEAS - SV(AO): 723.8 ML
BH CV ECHO MEAS - SV(CUBED): 86 ML
BH CV ECHO MEAS - SV(LVOT): 84.9 ML
BH CV ECHO MEAS - SV(MOD-SP4): 104 ML
BH CV ECHO MEAS - SV(RVOT): 66.1 ML
BH CV ECHO MEAS - SV(TEICH): 69.4 ML
BH CV ECHO MEAS - TAPSE (>1.6): 2 CM2
BH CV ECHO MEAS - TR MAX VEL: 294 CM/SEC
BH CV XLRA - RV BASE: 4 CM
BH CV XLRA - RV LENGTH: 8.1 CM
BH CV XLRA - RV MID: 3.9 CM
LEFT ATRIUM VOLUME INDEX: 44.6 ML/M2
LV EF 2D ECHO EST: 68 %

## 2017-05-02 PROCEDURE — 93000 ELECTROCARDIOGRAM COMPLETE: CPT | Performed by: INTERNAL MEDICINE

## 2017-05-02 PROCEDURE — 93306 TTE W/DOPPLER COMPLETE: CPT | Performed by: INTERNAL MEDICINE

## 2017-05-02 PROCEDURE — 93306 TTE W/DOPPLER COMPLETE: CPT

## 2017-05-02 PROCEDURE — 99214 OFFICE O/P EST MOD 30 MIN: CPT | Performed by: INTERNAL MEDICINE

## 2017-05-04 ENCOUNTER — TELEPHONE (OUTPATIENT)
Dept: CARDIOLOGY | Facility: CLINIC | Age: 72
End: 2017-05-04

## 2017-05-08 ENCOUNTER — HOSPITAL ENCOUNTER (INPATIENT)
Facility: HOSPITAL | Age: 72
LOS: 7 days | Discharge: HOME HEALTH | End: 2017-05-15
Attending: THORACIC SURGERY (CARDIOTHORACIC VASCULAR SURGERY) | Admitting: THORACIC SURGERY (CARDIOTHORACIC VASCULAR SURGERY)

## 2017-05-08 ENCOUNTER — ANESTHESIA (OUTPATIENT)
Dept: PERIOP | Facility: HOSPITAL | Age: 72
End: 2017-05-08

## 2017-05-08 ENCOUNTER — ANESTHESIA EVENT (OUTPATIENT)
Dept: PERIOP | Facility: HOSPITAL | Age: 72
End: 2017-05-08

## 2017-05-08 ENCOUNTER — APPOINTMENT (OUTPATIENT)
Dept: GENERAL RADIOLOGY | Facility: HOSPITAL | Age: 72
End: 2017-05-08

## 2017-05-08 DIAGNOSIS — R26.2 DIFFICULTY WALKING: Primary | ICD-10-CM

## 2017-05-08 DIAGNOSIS — E08.00 DIABETES MELLITUS DUE TO UNDERLYING CONDITION WITH HYPEROSMOLARITY WITHOUT COMA, WITHOUT LONG-TERM CURRENT USE OF INSULIN (HCC): ICD-10-CM

## 2017-05-08 DIAGNOSIS — J90 PLEURAL EFFUSION: ICD-10-CM

## 2017-05-08 DIAGNOSIS — J90 RECURRENT RIGHT PLEURAL EFFUSION: ICD-10-CM

## 2017-05-08 LAB
DEPRECATED RDW RBC AUTO: 43.9 FL (ref 37–54)
ERYTHROCYTE [DISTWIDTH] IN BLOOD BY AUTOMATED COUNT: 13.2 % (ref 11.5–14.5)
GLUCOSE BLDC GLUCOMTR-MCNC: 126 MG/DL (ref 70–130)
GLUCOSE BLDC GLUCOMTR-MCNC: 162 MG/DL (ref 70–130)
GLUCOSE BLDC GLUCOMTR-MCNC: 173 MG/DL (ref 70–130)
GLUCOSE BLDC GLUCOMTR-MCNC: 202 MG/DL (ref 70–130)
HCT VFR BLD AUTO: 31.9 % (ref 40.4–52.2)
HCT VFR BLD AUTO: 31.9 % (ref 40.4–52.2)
HGB BLD-MCNC: 10.5 G/DL (ref 13.7–17.6)
HGB BLD-MCNC: 10.5 G/DL (ref 13.7–17.6)
MCH RBC QN AUTO: 30.1 PG (ref 27–32.7)
MCHC RBC AUTO-ENTMCNC: 32.9 G/DL (ref 32.6–36.4)
MCV RBC AUTO: 91.4 FL (ref 79.8–96.2)
PLATELET # BLD AUTO: 164 10*3/MM3 (ref 140–500)
PMV BLD AUTO: 9.7 FL (ref 6–12)
RBC # BLD AUTO: 3.49 10*6/MM3 (ref 4.6–6)
WBC NRBC COR # BLD: 6.32 10*3/MM3 (ref 4.5–10.7)

## 2017-05-08 PROCEDURE — 25010000002 ALBUMIN HUMAN 5% PER 50 ML: Performed by: NURSE ANESTHETIST, CERTIFIED REGISTERED

## 2017-05-08 PROCEDURE — 25010000002 NEOSTIGMINE 10 MG/10ML SOLUTION: Performed by: NURSE ANESTHETIST, CERTIFIED REGISTERED

## 2017-05-08 PROCEDURE — 71010 HC CHEST PA OR AP: CPT

## 2017-05-08 PROCEDURE — 25010000002 FENTANYL CITRATE (PF) 100 MCG/2ML SOLUTION: Performed by: ANESTHESIOLOGY

## 2017-05-08 PROCEDURE — 94640 AIRWAY INHALATION TREATMENT: CPT

## 2017-05-08 PROCEDURE — P9041 ALBUMIN (HUMAN),5%, 50ML: HCPCS | Performed by: NURSE ANESTHETIST, CERTIFIED REGISTERED

## 2017-05-08 PROCEDURE — 85027 COMPLETE CBC AUTOMATED: CPT | Performed by: THORACIC SURGERY (CARDIOTHORACIC VASCULAR SURGERY)

## 2017-05-08 PROCEDURE — 88331 PATH CONSLTJ SURG 1 BLK 1SPC: CPT | Performed by: THORACIC SURGERY (CARDIOTHORACIC VASCULAR SURGERY)

## 2017-05-08 PROCEDURE — C1729 CATH, DRAINAGE: HCPCS | Performed by: THORACIC SURGERY (CARDIOTHORACIC VASCULAR SURGERY)

## 2017-05-08 PROCEDURE — 0BBN4ZX EXCISION OF RIGHT PLEURA, PERCUTANEOUS ENDOSCOPIC APPROACH, DIAGNOSTIC: ICD-10-PCS | Performed by: THORACIC SURGERY (CARDIOTHORACIC VASCULAR SURGERY)

## 2017-05-08 PROCEDURE — 88332 PATH CONSLTJ SURG EA ADD BLK: CPT | Performed by: THORACIC SURGERY (CARDIOTHORACIC VASCULAR SURGERY)

## 2017-05-08 PROCEDURE — 25010000002 HYDROMORPHONE PER 4 MG: Performed by: NURSE ANESTHETIST, CERTIFIED REGISTERED

## 2017-05-08 PROCEDURE — 32652 THORACOSCOPY REM TOTL CORTEX: CPT | Performed by: THORACIC SURGERY (CARDIOTHORACIC VASCULAR SURGERY)

## 2017-05-08 PROCEDURE — 94799 UNLISTED PULMONARY SVC/PX: CPT

## 2017-05-08 PROCEDURE — 82962 GLUCOSE BLOOD TEST: CPT

## 2017-05-08 PROCEDURE — 25010000002 PHENYLEPHRINE PER 1 ML: Performed by: NURSE ANESTHETIST, CERTIFIED REGISTERED

## 2017-05-08 PROCEDURE — 0BDP4ZZ EXTRACTION OF LEFT PLEURA, PERCUTANEOUS ENDOSCOPIC APPROACH: ICD-10-PCS | Performed by: THORACIC SURGERY (CARDIOTHORACIC VASCULAR SURGERY)

## 2017-05-08 PROCEDURE — 30233N1 TRANSFUSION OF NONAUTOLOGOUS RED BLOOD CELLS INTO PERIPHERAL VEIN, PERCUTANEOUS APPROACH: ICD-10-PCS | Performed by: ANESTHESIOLOGY

## 2017-05-08 PROCEDURE — 25010000002 MIDAZOLAM PER 1 MG: Performed by: ANESTHESIOLOGY

## 2017-05-08 PROCEDURE — 85018 HEMOGLOBIN: CPT | Performed by: NURSE ANESTHETIST, CERTIFIED REGISTERED

## 2017-05-08 PROCEDURE — 0BJ08ZZ INSPECTION OF TRACHEOBRONCHIAL TREE, VIA NATURAL OR ARTIFICIAL OPENING ENDOSCOPIC: ICD-10-PCS | Performed by: THORACIC SURGERY (CARDIOTHORACIC VASCULAR SURGERY)

## 2017-05-08 PROCEDURE — 0W9930Z DRAINAGE OF RIGHT PLEURAL CAVITY WITH DRAINAGE DEVICE, PERCUTANEOUS APPROACH: ICD-10-PCS | Performed by: THORACIC SURGERY (CARDIOTHORACIC VASCULAR SURGERY)

## 2017-05-08 PROCEDURE — 88305 TISSUE EXAM BY PATHOLOGIST: CPT | Performed by: THORACIC SURGERY (CARDIOTHORACIC VASCULAR SURGERY)

## 2017-05-08 PROCEDURE — 25010000002 PROPOFOL 10 MG/ML EMULSION: Performed by: NURSE ANESTHETIST, CERTIFIED REGISTERED

## 2017-05-08 PROCEDURE — 85014 HEMATOCRIT: CPT | Performed by: NURSE ANESTHETIST, CERTIFIED REGISTERED

## 2017-05-08 PROCEDURE — S0260 H&P FOR SURGERY: HCPCS | Performed by: THORACIC SURGERY (CARDIOTHORACIC VASCULAR SURGERY)

## 2017-05-08 PROCEDURE — 25010000002 ONDANSETRON PER 1 MG: Performed by: NURSE ANESTHETIST, CERTIFIED REGISTERED

## 2017-05-08 RX ORDER — ALBUMIN, HUMAN INJ 5% 5 %
SOLUTION INTRAVENOUS CONTINUOUS PRN
Status: DISCONTINUED | OUTPATIENT
Start: 2017-05-08 | End: 2017-05-08 | Stop reason: SURG

## 2017-05-08 RX ORDER — ONDANSETRON 2 MG/ML
4 INJECTION INTRAMUSCULAR; INTRAVENOUS EVERY 6 HOURS PRN
Status: DISCONTINUED | OUTPATIENT
Start: 2017-05-08 | End: 2017-05-15 | Stop reason: HOSPADM

## 2017-05-08 RX ORDER — SODIUM CHLORIDE, SODIUM LACTATE, POTASSIUM CHLORIDE, CALCIUM CHLORIDE 600; 310; 30; 20 MG/100ML; MG/100ML; MG/100ML; MG/100ML
9 INJECTION, SOLUTION INTRAVENOUS CONTINUOUS PRN
Status: DISCONTINUED | OUTPATIENT
Start: 2017-05-08 | End: 2017-05-08 | Stop reason: HOSPADM

## 2017-05-08 RX ORDER — CLINDAMYCIN PHOSPHATE 900 MG/50ML
900 INJECTION INTRAVENOUS EVERY 8 HOURS
Status: COMPLETED | OUTPATIENT
Start: 2017-05-08 | End: 2017-05-10

## 2017-05-08 RX ORDER — HYDROMORPHONE HYDROCHLORIDE 1 MG/ML
0.5 INJECTION, SOLUTION INTRAMUSCULAR; INTRAVENOUS; SUBCUTANEOUS
Status: DISCONTINUED | OUTPATIENT
Start: 2017-05-08 | End: 2017-05-08 | Stop reason: HOSPADM

## 2017-05-08 RX ORDER — GLIPIZIDE 10 MG/1
10 TABLET ORAL EVERY MORNING
Status: DISCONTINUED | OUTPATIENT
Start: 2017-05-09 | End: 2017-05-08

## 2017-05-08 RX ORDER — BISACODYL 10 MG
10 SUPPOSITORY, RECTAL RECTAL DAILY PRN
Status: DISCONTINUED | OUTPATIENT
Start: 2017-05-08 | End: 2017-05-15 | Stop reason: HOSPADM

## 2017-05-08 RX ORDER — PROMETHAZINE HYDROCHLORIDE 25 MG/1
25 SUPPOSITORY RECTAL ONCE AS NEEDED
Status: DISCONTINUED | OUTPATIENT
Start: 2017-05-08 | End: 2017-05-08 | Stop reason: HOSPADM

## 2017-05-08 RX ORDER — GLYCOPYRROLATE 0.2 MG/ML
INJECTION INTRAMUSCULAR; INTRAVENOUS AS NEEDED
Status: DISCONTINUED | OUTPATIENT
Start: 2017-05-08 | End: 2017-05-08 | Stop reason: SURG

## 2017-05-08 RX ORDER — ROCURONIUM BROMIDE 10 MG/ML
INJECTION, SOLUTION INTRAVENOUS AS NEEDED
Status: DISCONTINUED | OUTPATIENT
Start: 2017-05-08 | End: 2017-05-08 | Stop reason: SURG

## 2017-05-08 RX ORDER — MIDAZOLAM HYDROCHLORIDE 1 MG/ML
2 INJECTION INTRAMUSCULAR; INTRAVENOUS
Status: DISCONTINUED | OUTPATIENT
Start: 2017-05-08 | End: 2017-05-08 | Stop reason: HOSPADM

## 2017-05-08 RX ORDER — DIPHENHYDRAMINE HYDROCHLORIDE 50 MG/ML
12.5 INJECTION INTRAMUSCULAR; INTRAVENOUS
Status: DISCONTINUED | OUTPATIENT
Start: 2017-05-08 | End: 2017-05-08 | Stop reason: HOSPADM

## 2017-05-08 RX ORDER — ONDANSETRON 4 MG/1
4 TABLET, FILM COATED ORAL EVERY 6 HOURS PRN
Status: DISCONTINUED | OUTPATIENT
Start: 2017-05-08 | End: 2017-05-15 | Stop reason: HOSPADM

## 2017-05-08 RX ORDER — MIDAZOLAM HYDROCHLORIDE 1 MG/ML
1 INJECTION INTRAMUSCULAR; INTRAVENOUS
Status: DISCONTINUED | OUTPATIENT
Start: 2017-05-08 | End: 2017-05-08 | Stop reason: HOSPADM

## 2017-05-08 RX ORDER — ONDANSETRON 2 MG/ML
INJECTION INTRAMUSCULAR; INTRAVENOUS AS NEEDED
Status: DISCONTINUED | OUTPATIENT
Start: 2017-05-08 | End: 2017-05-08 | Stop reason: SURG

## 2017-05-08 RX ORDER — LIDOCAINE HYDROCHLORIDE 20 MG/ML
INJECTION, SOLUTION INFILTRATION; PERINEURAL AS NEEDED
Status: DISCONTINUED | OUTPATIENT
Start: 2017-05-08 | End: 2017-05-08 | Stop reason: SURG

## 2017-05-08 RX ORDER — FENTANYL CITRATE 50 UG/ML
25 INJECTION, SOLUTION INTRAMUSCULAR; INTRAVENOUS
Status: DISCONTINUED | OUTPATIENT
Start: 2017-05-08 | End: 2017-05-08 | Stop reason: HOSPADM

## 2017-05-08 RX ORDER — TRIAMTERENE AND HYDROCHLOROTHIAZIDE 37.5; 25 MG/1; MG/1
2 CAPSULE ORAL DAILY
Status: DISCONTINUED | OUTPATIENT
Start: 2017-05-08 | End: 2017-05-15 | Stop reason: HOSPADM

## 2017-05-08 RX ORDER — ONDANSETRON 4 MG/1
4 TABLET, ORALLY DISINTEGRATING ORAL EVERY 6 HOURS PRN
Status: DISCONTINUED | OUTPATIENT
Start: 2017-05-08 | End: 2017-05-15 | Stop reason: HOSPADM

## 2017-05-08 RX ORDER — MAGNESIUM HYDROXIDE 1200 MG/15ML
LIQUID ORAL AS NEEDED
Status: DISCONTINUED | OUTPATIENT
Start: 2017-05-08 | End: 2017-05-08 | Stop reason: HOSPADM

## 2017-05-08 RX ORDER — FLUMAZENIL 0.1 MG/ML
0.2 INJECTION INTRAVENOUS AS NEEDED
Status: DISCONTINUED | OUTPATIENT
Start: 2017-05-08 | End: 2017-05-08 | Stop reason: HOSPADM

## 2017-05-08 RX ORDER — HYDRALAZINE HYDROCHLORIDE 20 MG/ML
5 INJECTION INTRAMUSCULAR; INTRAVENOUS
Status: DISCONTINUED | OUTPATIENT
Start: 2017-05-08 | End: 2017-05-08 | Stop reason: HOSPADM

## 2017-05-08 RX ORDER — CLINDAMYCIN PHOSPHATE 900 MG/50ML
900 INJECTION INTRAVENOUS ONCE
Status: COMPLETED | OUTPATIENT
Start: 2017-05-08 | End: 2017-05-08

## 2017-05-08 RX ORDER — FENTANYL CITRATE 50 UG/ML
50 INJECTION, SOLUTION INTRAMUSCULAR; INTRAVENOUS
Status: DISCONTINUED | OUTPATIENT
Start: 2017-05-08 | End: 2017-05-08 | Stop reason: HOSPADM

## 2017-05-08 RX ORDER — SENNA AND DOCUSATE SODIUM 50; 8.6 MG/1; MG/1
2 TABLET, FILM COATED ORAL NIGHTLY
Status: DISCONTINUED | OUTPATIENT
Start: 2017-05-08 | End: 2017-05-15 | Stop reason: HOSPADM

## 2017-05-08 RX ORDER — HEPARIN SODIUM 5000 [USP'U]/ML
5000 INJECTION, SOLUTION INTRAVENOUS; SUBCUTANEOUS EVERY 8 HOURS SCHEDULED
Status: DISCONTINUED | OUTPATIENT
Start: 2017-05-09 | End: 2017-05-15 | Stop reason: HOSPADM

## 2017-05-08 RX ORDER — POLYETHYLENE GLYCOL 3350 17 G/17G
17 POWDER, FOR SOLUTION ORAL DAILY
Status: DISCONTINUED | OUTPATIENT
Start: 2017-05-08 | End: 2017-05-15 | Stop reason: HOSPADM

## 2017-05-08 RX ORDER — ALBUTEROL SULFATE 90 UG/1
2 AEROSOL, METERED RESPIRATORY (INHALATION) EVERY 4 HOURS PRN
Status: DISCONTINUED | OUTPATIENT
Start: 2017-05-08 | End: 2017-05-08 | Stop reason: CLARIF

## 2017-05-08 RX ORDER — DOCUSATE SODIUM 100 MG/1
100 CAPSULE, LIQUID FILLED ORAL 2 TIMES DAILY
Status: DISCONTINUED | OUTPATIENT
Start: 2017-05-08 | End: 2017-05-15 | Stop reason: HOSPADM

## 2017-05-08 RX ORDER — PROMETHAZINE HYDROCHLORIDE 25 MG/ML
12.5 INJECTION, SOLUTION INTRAMUSCULAR; INTRAVENOUS ONCE AS NEEDED
Status: DISCONTINUED | OUTPATIENT
Start: 2017-05-08 | End: 2017-05-08 | Stop reason: HOSPADM

## 2017-05-08 RX ORDER — NITROGLYCERIN 0.4 MG/1
0.4 TABLET SUBLINGUAL
Status: DISCONTINUED | OUTPATIENT
Start: 2017-05-08 | End: 2017-05-15 | Stop reason: HOSPADM

## 2017-05-08 RX ORDER — ROSUVASTATIN CALCIUM 20 MG/1
20 TABLET, COATED ORAL DAILY
Status: DISCONTINUED | OUTPATIENT
Start: 2017-05-08 | End: 2017-05-15 | Stop reason: HOSPADM

## 2017-05-08 RX ORDER — NALOXONE HCL 0.4 MG/ML
0.4 VIAL (ML) INJECTION
Status: DISCONTINUED | OUTPATIENT
Start: 2017-05-08 | End: 2017-05-15 | Stop reason: HOSPADM

## 2017-05-08 RX ORDER — ATORVASTATIN CALCIUM 40 MG/1
40 TABLET, FILM COATED ORAL DAILY
Status: DISCONTINUED | OUTPATIENT
Start: 2017-05-08 | End: 2017-05-08 | Stop reason: CLARIF

## 2017-05-08 RX ORDER — GLIPIZIDE 5 MG/1
5 TABLET ORAL EVERY EVENING
Status: DISCONTINUED | OUTPATIENT
Start: 2017-05-08 | End: 2017-05-15 | Stop reason: HOSPADM

## 2017-05-08 RX ORDER — MULTIVIT WITH MINERALS/LUTEIN
250 TABLET ORAL DAILY
Status: DISCONTINUED | OUTPATIENT
Start: 2017-05-08 | End: 2017-05-15 | Stop reason: HOSPADM

## 2017-05-08 RX ORDER — BUPIVACAINE HYDROCHLORIDE 5 MG/ML
INJECTION, SOLUTION EPIDURAL; INTRACAUDAL AS NEEDED
Status: DISCONTINUED | OUTPATIENT
Start: 2017-05-08 | End: 2017-05-08 | Stop reason: HOSPADM

## 2017-05-08 RX ORDER — ACETAMINOPHEN 325 MG/1
650 TABLET ORAL EVERY 4 HOURS PRN
Status: DISCONTINUED | OUTPATIENT
Start: 2017-05-08 | End: 2017-05-15 | Stop reason: HOSPADM

## 2017-05-08 RX ORDER — NEOSTIGMINE METHYLSULFATE 1 MG/ML
INJECTION, SOLUTION INTRAVENOUS AS NEEDED
Status: DISCONTINUED | OUTPATIENT
Start: 2017-05-08 | End: 2017-05-08 | Stop reason: SURG

## 2017-05-08 RX ORDER — HYDROCODONE BITARTRATE AND ACETAMINOPHEN 7.5; 325 MG/1; MG/1
1 TABLET ORAL EVERY 4 HOURS PRN
Status: DISCONTINUED | OUTPATIENT
Start: 2017-05-08 | End: 2017-05-15 | Stop reason: HOSPADM

## 2017-05-08 RX ORDER — OMEGA-3S/DHA/EPA/FISH OIL/D3 300MG-1000
400 CAPSULE ORAL DAILY
Status: DISCONTINUED | OUTPATIENT
Start: 2017-05-08 | End: 2017-05-15 | Stop reason: HOSPADM

## 2017-05-08 RX ORDER — ALBUTEROL SULFATE 2.5 MG/3ML
2.5 SOLUTION RESPIRATORY (INHALATION) EVERY 4 HOURS PRN
Status: DISCONTINUED | OUTPATIENT
Start: 2017-05-08 | End: 2017-05-15 | Stop reason: HOSPADM

## 2017-05-08 RX ORDER — SODIUM CHLORIDE 9 MG/ML
75 INJECTION, SOLUTION INTRAVENOUS CONTINUOUS
Status: DISCONTINUED | OUTPATIENT
Start: 2017-05-08 | End: 2017-05-09

## 2017-05-08 RX ORDER — LISINOPRIL 2.5 MG/1
2.5 TABLET ORAL EVERY MORNING
Status: DISCONTINUED | OUTPATIENT
Start: 2017-05-09 | End: 2017-05-08

## 2017-05-08 RX ORDER — ATENOLOL 50 MG/1
50 TABLET ORAL 2 TIMES DAILY
Status: DISCONTINUED | OUTPATIENT
Start: 2017-05-08 | End: 2017-05-15 | Stop reason: HOSPADM

## 2017-05-08 RX ORDER — SODIUM CHLORIDE 0.9 % (FLUSH) 0.9 %
1-10 SYRINGE (ML) INJECTION AS NEEDED
Status: DISCONTINUED | OUTPATIENT
Start: 2017-05-08 | End: 2017-05-15 | Stop reason: HOSPADM

## 2017-05-08 RX ORDER — ONDANSETRON 2 MG/ML
4 INJECTION INTRAMUSCULAR; INTRAVENOUS ONCE AS NEEDED
Status: DISCONTINUED | OUTPATIENT
Start: 2017-05-08 | End: 2017-05-08 | Stop reason: HOSPADM

## 2017-05-08 RX ORDER — SODIUM CHLORIDE 0.9 % (FLUSH) 0.9 %
1-10 SYRINGE (ML) INJECTION AS NEEDED
Status: DISCONTINUED | OUTPATIENT
Start: 2017-05-08 | End: 2017-05-08 | Stop reason: HOSPADM

## 2017-05-08 RX ORDER — LISINOPRIL 2.5 MG/1
2.5 TABLET ORAL DAILY
Status: DISCONTINUED | OUTPATIENT
Start: 2017-05-08 | End: 2017-05-15 | Stop reason: HOSPADM

## 2017-05-08 RX ORDER — GLIPIZIDE 10 MG/1
10 TABLET ORAL EVERY MORNING
Status: DISCONTINUED | OUTPATIENT
Start: 2017-05-09 | End: 2017-05-15 | Stop reason: HOSPADM

## 2017-05-08 RX ORDER — METFORMIN HYDROCHLORIDE 750 MG/1
750 TABLET, EXTENDED RELEASE ORAL 2 TIMES DAILY
Status: DISCONTINUED | OUTPATIENT
Start: 2017-05-08 | End: 2017-05-09

## 2017-05-08 RX ORDER — MORPHINE SULFATE 2 MG/ML
2 INJECTION, SOLUTION INTRAMUSCULAR; INTRAVENOUS EVERY 4 HOURS PRN
Status: DISCONTINUED | OUTPATIENT
Start: 2017-05-08 | End: 2017-05-15 | Stop reason: HOSPADM

## 2017-05-08 RX ORDER — CLOPIDOGREL BISULFATE 75 MG/1
75 TABLET ORAL DAILY
Status: DISCONTINUED | OUTPATIENT
Start: 2017-05-08 | End: 2017-05-15 | Stop reason: HOSPADM

## 2017-05-08 RX ORDER — PROPOFOL 10 MG/ML
VIAL (ML) INTRAVENOUS AS NEEDED
Status: DISCONTINUED | OUTPATIENT
Start: 2017-05-08 | End: 2017-05-08 | Stop reason: SURG

## 2017-05-08 RX ORDER — TRIAMTERENE AND HYDROCHLOROTHIAZIDE 37.5; 25 MG/1; MG/1
2 CAPSULE ORAL EVERY MORNING
Status: DISCONTINUED | OUTPATIENT
Start: 2017-05-09 | End: 2017-05-08

## 2017-05-08 RX ORDER — LABETALOL HYDROCHLORIDE 5 MG/ML
5 INJECTION, SOLUTION INTRAVENOUS
Status: DISCONTINUED | OUTPATIENT
Start: 2017-05-08 | End: 2017-05-08 | Stop reason: HOSPADM

## 2017-05-08 RX ORDER — PROMETHAZINE HYDROCHLORIDE 25 MG/1
25 TABLET ORAL ONCE AS NEEDED
Status: DISCONTINUED | OUTPATIENT
Start: 2017-05-08 | End: 2017-05-08 | Stop reason: HOSPADM

## 2017-05-08 RX ORDER — NALOXONE HCL 0.4 MG/ML
0.2 VIAL (ML) INJECTION AS NEEDED
Status: DISCONTINUED | OUTPATIENT
Start: 2017-05-08 | End: 2017-05-08 | Stop reason: HOSPADM

## 2017-05-08 RX ORDER — IPRATROPIUM BROMIDE AND ALBUTEROL SULFATE 2.5; .5 MG/3ML; MG/3ML
3 SOLUTION RESPIRATORY (INHALATION)
Status: DISPENSED | OUTPATIENT
Start: 2017-05-08 | End: 2017-05-10

## 2017-05-08 RX ORDER — FAMOTIDINE 10 MG/ML
20 INJECTION, SOLUTION INTRAVENOUS
Status: DISCONTINUED | OUTPATIENT
Start: 2017-05-08 | End: 2017-05-08 | Stop reason: HOSPADM

## 2017-05-08 RX ADMIN — HYDROCODONE BITARTRATE AND ACETAMINOPHEN 1 TABLET: 7.5; 325 TABLET ORAL at 16:12

## 2017-05-08 RX ADMIN — ROCURONIUM BROMIDE 50 MG: 10 INJECTION INTRAVENOUS at 07:40

## 2017-05-08 RX ADMIN — PHENYLEPHRINE HYDROCHLORIDE 200 MCG: 10 INJECTION INTRAVENOUS at 08:16

## 2017-05-08 RX ADMIN — ASCORBIC ACID TAB 250 MG 250 MG: 250 TAB at 16:02

## 2017-05-08 RX ADMIN — PHENYLEPHRINE HYDROCHLORIDE 200 MCG: 10 INJECTION INTRAVENOUS at 08:04

## 2017-05-08 RX ADMIN — FENTANYL CITRATE 50 MCG: 50 INJECTION INTRAMUSCULAR; INTRAVENOUS at 08:12

## 2017-05-08 RX ADMIN — FENTANYL CITRATE 100 MCG: 50 INJECTION INTRAMUSCULAR; INTRAVENOUS at 08:11

## 2017-05-08 RX ADMIN — SODIUM CHLORIDE 75 ML/HR: 9 INJECTION, SOLUTION INTRAVENOUS at 14:15

## 2017-05-08 RX ADMIN — PHENYLEPHRINE HYDROCHLORIDE 100 MCG: 10 INJECTION INTRAVENOUS at 09:41

## 2017-05-08 RX ADMIN — CLINDAMYCIN PHOSPHATE 900 MG: 18 INJECTION, SOLUTION INTRAMUSCULAR; INTRAVENOUS at 16:01

## 2017-05-08 RX ADMIN — FENTANYL CITRATE 100 MCG: 50 INJECTION INTRAMUSCULAR; INTRAVENOUS at 07:40

## 2017-05-08 RX ADMIN — ALBUMIN (HUMAN): 0.05 SOLUTION INTRAVENOUS at 10:15

## 2017-05-08 RX ADMIN — NEOSTIGMINE METHYLSULFATE 4 MG: 1 INJECTION INTRAVENOUS at 10:44

## 2017-05-08 RX ADMIN — PHENYLEPHRINE HYDROCHLORIDE 100 MCG: 10 INJECTION INTRAVENOUS at 08:58

## 2017-05-08 RX ADMIN — ROSUVASTATIN CALCIUM 20 MG: 20 TABLET, FILM COATED ORAL at 16:02

## 2017-05-08 RX ADMIN — PHENYLEPHRINE HYDROCHLORIDE 200 MCG: 10 INJECTION INTRAVENOUS at 09:24

## 2017-05-08 RX ADMIN — LIDOCAINE HYDROCHLORIDE 80 MG: 20 INJECTION, SOLUTION INFILTRATION; PERINEURAL at 07:40

## 2017-05-08 RX ADMIN — HYDROMORPHONE HYDROCHLORIDE 0.5 MG: 1 INJECTION, SOLUTION INTRAMUSCULAR; INTRAVENOUS; SUBCUTANEOUS at 12:25

## 2017-05-08 RX ADMIN — ROCURONIUM BROMIDE 20 MG: 10 INJECTION INTRAVENOUS at 08:45

## 2017-05-08 RX ADMIN — SODIUM CHLORIDE, POTASSIUM CHLORIDE, SODIUM LACTATE AND CALCIUM CHLORIDE: 600; 310; 30; 20 INJECTION, SOLUTION INTRAVENOUS at 07:35

## 2017-05-08 RX ADMIN — HYDROMORPHONE HYDROCHLORIDE 0.5 MG: 1 INJECTION, SOLUTION INTRAMUSCULAR; INTRAVENOUS; SUBCUTANEOUS at 11:46

## 2017-05-08 RX ADMIN — PHENYLEPHRINE HYDROCHLORIDE 100 MCG: 10 INJECTION INTRAVENOUS at 09:52

## 2017-05-08 RX ADMIN — DOCUSATE SODIUM,SENNOSIDES 2 TABLET: 50; 8.6 TABLET, FILM COATED ORAL at 20:39

## 2017-05-08 RX ADMIN — ONDANSETRON 4 MG: 2 INJECTION INTRAMUSCULAR; INTRAVENOUS at 08:24

## 2017-05-08 RX ADMIN — PHENYLEPHRINE HYDROCHLORIDE 200 MCG: 10 INJECTION INTRAVENOUS at 10:35

## 2017-05-08 RX ADMIN — CLINDAMYCIN PHOSPHATE 900 MG: 18 INJECTION, SOLUTION INTRAMUSCULAR; INTRAVENOUS at 22:16

## 2017-05-08 RX ADMIN — CLINDAMYCIN PHOSPHATE 900 MG: 18 INJECTION, SOLUTION INTRAVENOUS at 07:37

## 2017-05-08 RX ADMIN — ROCURONIUM BROMIDE 20 MG: 10 INJECTION INTRAVENOUS at 09:55

## 2017-05-08 RX ADMIN — HYDROMORPHONE HYDROCHLORIDE 0.5 MG: 1 INJECTION, SOLUTION INTRAMUSCULAR; INTRAVENOUS; SUBCUTANEOUS at 11:23

## 2017-05-08 RX ADMIN — Medication 100 UNITS: at 16:02

## 2017-05-08 RX ADMIN — SODIUM CHLORIDE, POTASSIUM CHLORIDE, SODIUM LACTATE AND CALCIUM CHLORIDE: 600; 310; 30; 20 INJECTION, SOLUTION INTRAVENOUS at 09:45

## 2017-05-08 RX ADMIN — IPRATROPIUM BROMIDE AND ALBUTEROL SULFATE 3 ML: .5; 3 SOLUTION RESPIRATORY (INHALATION) at 23:57

## 2017-05-08 RX ADMIN — POLYETHYLENE GLYCOL 3350 17 G: 17 POWDER, FOR SOLUTION ORAL at 16:02

## 2017-05-08 RX ADMIN — CLOPIDOGREL 75 MG: 75 TABLET, FILM COATED ORAL at 16:02

## 2017-05-08 RX ADMIN — PROPOFOL 200 MG: 10 INJECTION, EMULSION INTRAVENOUS at 07:40

## 2017-05-08 RX ADMIN — IPRATROPIUM BROMIDE AND ALBUTEROL SULFATE 3 ML: .5; 3 SOLUTION RESPIRATORY (INHALATION) at 15:15

## 2017-05-08 RX ADMIN — GLIPIZIDE 5 MG: 5 TABLET ORAL at 16:12

## 2017-05-08 RX ADMIN — FENTANYL CITRATE 25 MCG: 50 INJECTION INTRAMUSCULAR; INTRAVENOUS at 07:06

## 2017-05-08 RX ADMIN — PHENYLEPHRINE HYDROCHLORIDE 200 MCG: 10 INJECTION INTRAVENOUS at 10:15

## 2017-05-08 RX ADMIN — MIDAZOLAM 1 MG: 1 INJECTION INTRAMUSCULAR; INTRAVENOUS at 07:06

## 2017-05-08 RX ADMIN — PHENYLEPHRINE HYDROCHLORIDE 100 MCG: 10 INJECTION INTRAVENOUS at 10:04

## 2017-05-08 RX ADMIN — EPHEDRINE SULFATE 10 MG: 50 INJECTION INTRAMUSCULAR; INTRAVENOUS; SUBCUTANEOUS at 07:52

## 2017-05-08 RX ADMIN — GLYCOPYRROLATE 0.6 MG: 0.2 INJECTION INTRAMUSCULAR; INTRAVENOUS at 10:41

## 2017-05-08 RX ADMIN — PHENYLEPHRINE HYDROCHLORIDE 200 MCG: 10 INJECTION INTRAVENOUS at 07:57

## 2017-05-08 RX ADMIN — CHOLECALCIFEROL TAB 10 MCG (400 UNIT) 400 UNITS: 10 TAB at 16:02

## 2017-05-08 RX ADMIN — FAMOTIDINE 20 MG: 10 INJECTION, SOLUTION INTRAVENOUS at 07:01

## 2017-05-08 RX ADMIN — PHENYLEPHRINE HYDROCHLORIDE 200 MCG: 10 INJECTION INTRAVENOUS at 07:55

## 2017-05-08 RX ADMIN — HYDROCODONE BITARTRATE AND ACETAMINOPHEN 1 TABLET: 7.5; 325 TABLET ORAL at 20:39

## 2017-05-09 ENCOUNTER — APPOINTMENT (OUTPATIENT)
Dept: GENERAL RADIOLOGY | Facility: HOSPITAL | Age: 72
End: 2017-05-09

## 2017-05-09 LAB
ANION GAP SERPL CALCULATED.3IONS-SCNC: 11.6 MMOL/L
BUN BLD-MCNC: 50 MG/DL (ref 8–23)
BUN/CREAT SERPL: 30.7 (ref 7–25)
CALCIUM SPEC-SCNC: 8.2 MG/DL (ref 8.6–10.5)
CHLORIDE SERPL-SCNC: 97 MMOL/L (ref 98–107)
CO2 SERPL-SCNC: 25.4 MMOL/L (ref 22–29)
CREAT BLD-MCNC: 1.63 MG/DL (ref 0.76–1.27)
DEPRECATED RDW RBC AUTO: 44.1 FL (ref 37–54)
ERYTHROCYTE [DISTWIDTH] IN BLOOD BY AUTOMATED COUNT: 13.2 % (ref 11.5–14.5)
GFR SERPL CREATININE-BSD FRML MDRD: 42 ML/MIN/1.73
GLUCOSE BLD-MCNC: 156 MG/DL (ref 65–99)
GLUCOSE BLDC GLUCOMTR-MCNC: 164 MG/DL (ref 70–130)
GLUCOSE BLDC GLUCOMTR-MCNC: 189 MG/DL (ref 70–130)
GLUCOSE BLDC GLUCOMTR-MCNC: 196 MG/DL (ref 70–130)
GLUCOSE BLDC GLUCOMTR-MCNC: 245 MG/DL (ref 70–130)
HCT VFR BLD AUTO: 27.8 % (ref 40.4–52.2)
HGB BLD-MCNC: 9.2 G/DL (ref 13.7–17.6)
LAB AP CASE REPORT: NORMAL
Lab: NORMAL
Lab: NORMAL
MCH RBC QN AUTO: 30.4 PG (ref 27–32.7)
MCHC RBC AUTO-ENTMCNC: 33.1 G/DL (ref 32.6–36.4)
MCV RBC AUTO: 91.7 FL (ref 79.8–96.2)
PATH REPORT.FINAL DX SPEC: NORMAL
PATH REPORT.GROSS SPEC: NORMAL
PLATELET # BLD AUTO: 170 10*3/MM3 (ref 140–500)
PMV BLD AUTO: 10.1 FL (ref 6–12)
POTASSIUM BLD-SCNC: 4.6 MMOL/L (ref 3.5–5.2)
RBC # BLD AUTO: 3.03 10*6/MM3 (ref 4.6–6)
SODIUM BLD-SCNC: 134 MMOL/L (ref 136–145)
WBC NRBC COR # BLD: 7.64 10*3/MM3 (ref 4.5–10.7)

## 2017-05-09 PROCEDURE — 85027 COMPLETE CBC AUTOMATED: CPT | Performed by: THORACIC SURGERY (CARDIOTHORACIC VASCULAR SURGERY)

## 2017-05-09 PROCEDURE — 82962 GLUCOSE BLOOD TEST: CPT

## 2017-05-09 PROCEDURE — 94799 UNLISTED PULMONARY SVC/PX: CPT

## 2017-05-09 PROCEDURE — 25010000002 HEPARIN (PORCINE) PER 1000 UNITS: Performed by: THORACIC SURGERY (CARDIOTHORACIC VASCULAR SURGERY)

## 2017-05-09 PROCEDURE — 63710000001 INSULIN ASPART PER 5 UNITS: Performed by: NURSE PRACTITIONER

## 2017-05-09 PROCEDURE — 97110 THERAPEUTIC EXERCISES: CPT

## 2017-05-09 PROCEDURE — 71010 HC CHEST PA OR AP: CPT

## 2017-05-09 PROCEDURE — 99024 POSTOP FOLLOW-UP VISIT: CPT | Performed by: NURSE PRACTITIONER

## 2017-05-09 PROCEDURE — 97162 PT EVAL MOD COMPLEX 30 MIN: CPT

## 2017-05-09 PROCEDURE — 80048 BASIC METABOLIC PNL TOTAL CA: CPT | Performed by: THORACIC SURGERY (CARDIOTHORACIC VASCULAR SURGERY)

## 2017-05-09 RX ORDER — DEXTROSE MONOHYDRATE 25 G/50ML
25 INJECTION, SOLUTION INTRAVENOUS
Status: DISCONTINUED | OUTPATIENT
Start: 2017-05-09 | End: 2017-05-15 | Stop reason: HOSPADM

## 2017-05-09 RX ORDER — NICOTINE POLACRILEX 4 MG
15 LOZENGE BUCCAL
Status: DISCONTINUED | OUTPATIENT
Start: 2017-05-09 | End: 2017-05-15 | Stop reason: HOSPADM

## 2017-05-09 RX ADMIN — CHOLECALCIFEROL TAB 10 MCG (400 UNIT) 400 UNITS: 10 TAB at 08:42

## 2017-05-09 RX ADMIN — CLINDAMYCIN PHOSPHATE 900 MG: 18 INJECTION, SOLUTION INTRAMUSCULAR; INTRAVENOUS at 14:28

## 2017-05-09 RX ADMIN — HYDROCODONE BITARTRATE AND ACETAMINOPHEN 1 TABLET: 7.5; 325 TABLET ORAL at 00:33

## 2017-05-09 RX ADMIN — CLINDAMYCIN PHOSPHATE 900 MG: 18 INJECTION, SOLUTION INTRAMUSCULAR; INTRAVENOUS at 06:16

## 2017-05-09 RX ADMIN — IPRATROPIUM BROMIDE AND ALBUTEROL SULFATE 3 ML: .5; 3 SOLUTION RESPIRATORY (INHALATION) at 07:05

## 2017-05-09 RX ADMIN — CLOPIDOGREL 75 MG: 75 TABLET, FILM COATED ORAL at 21:02

## 2017-05-09 RX ADMIN — INSULIN ASPART 2 UNITS: 100 INJECTION, SOLUTION INTRAVENOUS; SUBCUTANEOUS at 17:56

## 2017-05-09 RX ADMIN — METFORMIN HYDROCHLORIDE 750 MG: 500 TABLET ORAL at 17:55

## 2017-05-09 RX ADMIN — CLINDAMYCIN PHOSPHATE 900 MG: 18 INJECTION, SOLUTION INTRAMUSCULAR; INTRAVENOUS at 23:09

## 2017-05-09 RX ADMIN — SODIUM CHLORIDE 75 ML/HR: 9 INJECTION, SOLUTION INTRAVENOUS at 02:40

## 2017-05-09 RX ADMIN — INSULIN ASPART 4 UNITS: 100 INJECTION, SOLUTION INTRAVENOUS; SUBCUTANEOUS at 12:18

## 2017-05-09 RX ADMIN — HYDROCODONE BITARTRATE AND ACETAMINOPHEN 1 TABLET: 7.5; 325 TABLET ORAL at 20:12

## 2017-05-09 RX ADMIN — DOCUSATE SODIUM 100 MG: 100 CAPSULE, LIQUID FILLED ORAL at 08:41

## 2017-05-09 RX ADMIN — HYDROCODONE BITARTRATE AND ACETAMINOPHEN 1 TABLET: 7.5; 325 TABLET ORAL at 04:25

## 2017-05-09 RX ADMIN — Medication 100 UNITS: at 08:42

## 2017-05-09 RX ADMIN — DOCUSATE SODIUM,SENNOSIDES 2 TABLET: 50; 8.6 TABLET, FILM COATED ORAL at 20:12

## 2017-05-09 RX ADMIN — POLYETHYLENE GLYCOL 3350 17 G: 17 POWDER, FOR SOLUTION ORAL at 08:41

## 2017-05-09 RX ADMIN — METFORMIN HYDROCHLORIDE 750 MG: 500 TABLET ORAL at 12:15

## 2017-05-09 RX ADMIN — GLIPIZIDE 10 MG: 10 TABLET ORAL at 08:42

## 2017-05-09 RX ADMIN — HEPARIN SODIUM 5000 UNITS: 5000 INJECTION, SOLUTION INTRAVENOUS; SUBCUTANEOUS at 06:15

## 2017-05-09 RX ADMIN — GLIPIZIDE 5 MG: 5 TABLET ORAL at 17:54

## 2017-05-09 RX ADMIN — DOCUSATE SODIUM 100 MG: 100 CAPSULE, LIQUID FILLED ORAL at 17:54

## 2017-05-09 RX ADMIN — IPRATROPIUM BROMIDE AND ALBUTEROL SULFATE 3 ML: .5; 3 SOLUTION RESPIRATORY (INHALATION) at 15:06

## 2017-05-09 RX ADMIN — ATENOLOL 50 MG: 50 TABLET ORAL at 18:00

## 2017-05-09 RX ADMIN — ROSUVASTATIN CALCIUM 20 MG: 20 TABLET, FILM COATED ORAL at 21:02

## 2017-05-09 RX ADMIN — ASCORBIC ACID TAB 250 MG 250 MG: 250 TAB at 08:41

## 2017-05-09 RX ADMIN — HEPARIN SODIUM 5000 UNITS: 5000 INJECTION, SOLUTION INTRAVENOUS; SUBCUTANEOUS at 14:27

## 2017-05-09 RX ADMIN — INSULIN ASPART 2 UNITS: 100 INJECTION, SOLUTION INTRAVENOUS; SUBCUTANEOUS at 21:08

## 2017-05-09 RX ADMIN — HYDROCODONE BITARTRATE AND ACETAMINOPHEN 1 TABLET: 7.5; 325 TABLET ORAL at 10:45

## 2017-05-09 RX ADMIN — HEPARIN SODIUM 5000 UNITS: 5000 INJECTION, SOLUTION INTRAVENOUS; SUBCUTANEOUS at 21:02

## 2017-05-10 ENCOUNTER — APPOINTMENT (OUTPATIENT)
Dept: GENERAL RADIOLOGY | Facility: HOSPITAL | Age: 72
End: 2017-05-10

## 2017-05-10 LAB
GLUCOSE BLDC GLUCOMTR-MCNC: 129 MG/DL (ref 70–130)
GLUCOSE BLDC GLUCOMTR-MCNC: 159 MG/DL (ref 70–130)
GLUCOSE BLDC GLUCOMTR-MCNC: 178 MG/DL (ref 70–130)
GLUCOSE BLDC GLUCOMTR-MCNC: 237 MG/DL (ref 70–130)

## 2017-05-10 PROCEDURE — 25010000002 HEPARIN (PORCINE) PER 1000 UNITS: Performed by: THORACIC SURGERY (CARDIOTHORACIC VASCULAR SURGERY)

## 2017-05-10 PROCEDURE — 97110 THERAPEUTIC EXERCISES: CPT

## 2017-05-10 PROCEDURE — 63710000001 INSULIN ASPART PER 5 UNITS: Performed by: NURSE PRACTITIONER

## 2017-05-10 PROCEDURE — 71010 HC CHEST PA OR AP: CPT

## 2017-05-10 PROCEDURE — 94799 UNLISTED PULMONARY SVC/PX: CPT

## 2017-05-10 PROCEDURE — 99024 POSTOP FOLLOW-UP VISIT: CPT | Performed by: NURSE PRACTITIONER

## 2017-05-10 PROCEDURE — 82962 GLUCOSE BLOOD TEST: CPT

## 2017-05-10 RX ADMIN — INSULIN ASPART 2 UNITS: 100 INJECTION, SOLUTION INTRAVENOUS; SUBCUTANEOUS at 20:54

## 2017-05-10 RX ADMIN — CHOLECALCIFEROL TAB 10 MCG (400 UNIT) 400 UNITS: 10 TAB at 08:16

## 2017-05-10 RX ADMIN — IPRATROPIUM BROMIDE AND ALBUTEROL SULFATE 3 ML: .5; 3 SOLUTION RESPIRATORY (INHALATION) at 09:02

## 2017-05-10 RX ADMIN — METFORMIN HYDROCHLORIDE 750 MG: 500 TABLET ORAL at 17:50

## 2017-05-10 RX ADMIN — CLOPIDOGREL 75 MG: 75 TABLET, FILM COATED ORAL at 20:54

## 2017-05-10 RX ADMIN — HEPARIN SODIUM 5000 UNITS: 5000 INJECTION, SOLUTION INTRAVENOUS; SUBCUTANEOUS at 20:54

## 2017-05-10 RX ADMIN — ROSUVASTATIN CALCIUM 20 MG: 20 TABLET, FILM COATED ORAL at 20:54

## 2017-05-10 RX ADMIN — ASCORBIC ACID TAB 250 MG 250 MG: 250 TAB at 08:16

## 2017-05-10 RX ADMIN — ATENOLOL 50 MG: 50 TABLET ORAL at 08:16

## 2017-05-10 RX ADMIN — INSULIN ASPART 4 UNITS: 100 INJECTION, SOLUTION INTRAVENOUS; SUBCUTANEOUS at 11:59

## 2017-05-10 RX ADMIN — Medication 100 UNITS: at 08:16

## 2017-05-10 RX ADMIN — GLIPIZIDE 10 MG: 10 TABLET ORAL at 08:17

## 2017-05-10 RX ADMIN — POLYETHYLENE GLYCOL 3350 17 G: 17 POWDER, FOR SOLUTION ORAL at 08:16

## 2017-05-10 RX ADMIN — INSULIN ASPART 2 UNITS: 100 INJECTION, SOLUTION INTRAVENOUS; SUBCUTANEOUS at 08:16

## 2017-05-10 RX ADMIN — HYDROCODONE BITARTRATE AND ACETAMINOPHEN 1 TABLET: 7.5; 325 TABLET ORAL at 10:42

## 2017-05-10 RX ADMIN — HYDROCODONE BITARTRATE AND ACETAMINOPHEN 1 TABLET: 7.5; 325 TABLET ORAL at 17:50

## 2017-05-10 RX ADMIN — TRIAMTERENE AND HYDROCHLOROTHIAZIDE 2 CAPSULE: 37.5; 25 CAPSULE ORAL at 08:16

## 2017-05-10 RX ADMIN — CLINDAMYCIN PHOSPHATE 900 MG: 18 INJECTION, SOLUTION INTRAMUSCULAR; INTRAVENOUS at 06:05

## 2017-05-10 RX ADMIN — METFORMIN HYDROCHLORIDE 750 MG: 500 TABLET ORAL at 08:16

## 2017-05-10 RX ADMIN — LISINOPRIL 2.5 MG: 2.5 TABLET ORAL at 08:16

## 2017-05-10 RX ADMIN — DOCUSATE SODIUM,SENNOSIDES 2 TABLET: 50; 8.6 TABLET, FILM COATED ORAL at 20:54

## 2017-05-10 RX ADMIN — DOCUSATE SODIUM 100 MG: 100 CAPSULE, LIQUID FILLED ORAL at 08:16

## 2017-05-10 RX ADMIN — DOCUSATE SODIUM 100 MG: 100 CAPSULE, LIQUID FILLED ORAL at 17:50

## 2017-05-10 RX ADMIN — HEPARIN SODIUM 5000 UNITS: 5000 INJECTION, SOLUTION INTRAVENOUS; SUBCUTANEOUS at 05:36

## 2017-05-10 RX ADMIN — ATENOLOL 50 MG: 50 TABLET ORAL at 17:50

## 2017-05-10 RX ADMIN — GLIPIZIDE 5 MG: 5 TABLET ORAL at 17:50

## 2017-05-10 RX ADMIN — HEPARIN SODIUM 5000 UNITS: 5000 INJECTION, SOLUTION INTRAVENOUS; SUBCUTANEOUS at 14:53

## 2017-05-11 ENCOUNTER — APPOINTMENT (OUTPATIENT)
Dept: GENERAL RADIOLOGY | Facility: HOSPITAL | Age: 72
End: 2017-05-11

## 2017-05-11 LAB
GLUCOSE BLDC GLUCOMTR-MCNC: 145 MG/DL (ref 70–130)
GLUCOSE BLDC GLUCOMTR-MCNC: 171 MG/DL (ref 70–130)
GLUCOSE BLDC GLUCOMTR-MCNC: 172 MG/DL (ref 70–130)
GLUCOSE BLDC GLUCOMTR-MCNC: 237 MG/DL (ref 70–130)

## 2017-05-11 PROCEDURE — 71010 HC CHEST PA OR AP: CPT

## 2017-05-11 PROCEDURE — 82962 GLUCOSE BLOOD TEST: CPT

## 2017-05-11 PROCEDURE — 97110 THERAPEUTIC EXERCISES: CPT

## 2017-05-11 PROCEDURE — 63710000001 INSULIN ASPART PER 5 UNITS: Performed by: NURSE PRACTITIONER

## 2017-05-11 PROCEDURE — 25010000002 HEPARIN (PORCINE) PER 1000 UNITS: Performed by: THORACIC SURGERY (CARDIOTHORACIC VASCULAR SURGERY)

## 2017-05-11 PROCEDURE — 25010000002 ONDANSETRON PER 1 MG: Performed by: THORACIC SURGERY (CARDIOTHORACIC VASCULAR SURGERY)

## 2017-05-11 PROCEDURE — 99024 POSTOP FOLLOW-UP VISIT: CPT | Performed by: NURSE PRACTITIONER

## 2017-05-11 RX ADMIN — HEPARIN SODIUM 5000 UNITS: 5000 INJECTION, SOLUTION INTRAVENOUS; SUBCUTANEOUS at 21:26

## 2017-05-11 RX ADMIN — ATENOLOL 50 MG: 50 TABLET ORAL at 08:47

## 2017-05-11 RX ADMIN — CHOLECALCIFEROL TAB 10 MCG (400 UNIT) 400 UNITS: 10 TAB at 08:47

## 2017-05-11 RX ADMIN — ACETAMINOPHEN 650 MG: 325 TABLET ORAL at 20:35

## 2017-05-11 RX ADMIN — ONDANSETRON 4 MG: 2 INJECTION INTRAMUSCULAR; INTRAVENOUS at 14:04

## 2017-05-11 RX ADMIN — LISINOPRIL 2.5 MG: 2.5 TABLET ORAL at 08:47

## 2017-05-11 RX ADMIN — HEPARIN SODIUM 5000 UNITS: 5000 INJECTION, SOLUTION INTRAVENOUS; SUBCUTANEOUS at 14:04

## 2017-05-11 RX ADMIN — INSULIN ASPART 2 UNITS: 100 INJECTION, SOLUTION INTRAVENOUS; SUBCUTANEOUS at 12:21

## 2017-05-11 RX ADMIN — METFORMIN HYDROCHLORIDE 750 MG: 500 TABLET ORAL at 18:01

## 2017-05-11 RX ADMIN — DOCUSATE SODIUM,SENNOSIDES 2 TABLET: 50; 8.6 TABLET, FILM COATED ORAL at 20:35

## 2017-05-11 RX ADMIN — TRIAMTERENE AND HYDROCHLOROTHIAZIDE 2 CAPSULE: 37.5; 25 CAPSULE ORAL at 08:47

## 2017-05-11 RX ADMIN — ATENOLOL 50 MG: 50 TABLET ORAL at 18:01

## 2017-05-11 RX ADMIN — GLIPIZIDE 10 MG: 10 TABLET ORAL at 08:46

## 2017-05-11 RX ADMIN — INSULIN ASPART 4 UNITS: 100 INJECTION, SOLUTION INTRAVENOUS; SUBCUTANEOUS at 20:53

## 2017-05-11 RX ADMIN — ROSUVASTATIN CALCIUM 20 MG: 20 TABLET, FILM COATED ORAL at 20:34

## 2017-05-11 RX ADMIN — ASCORBIC ACID TAB 250 MG 250 MG: 250 TAB at 08:47

## 2017-05-11 RX ADMIN — CLOPIDOGREL 75 MG: 75 TABLET, FILM COATED ORAL at 20:35

## 2017-05-11 RX ADMIN — METFORMIN HYDROCHLORIDE 750 MG: 500 TABLET ORAL at 08:47

## 2017-05-11 RX ADMIN — Medication 100 UNITS: at 08:47

## 2017-05-11 RX ADMIN — POLYETHYLENE GLYCOL 3350 17 G: 17 POWDER, FOR SOLUTION ORAL at 08:47

## 2017-05-11 RX ADMIN — DOCUSATE SODIUM 100 MG: 100 CAPSULE, LIQUID FILLED ORAL at 18:02

## 2017-05-11 RX ADMIN — DOCUSATE SODIUM 100 MG: 100 CAPSULE, LIQUID FILLED ORAL at 08:47

## 2017-05-11 RX ADMIN — GLIPIZIDE 5 MG: 5 TABLET ORAL at 18:01

## 2017-05-11 RX ADMIN — HEPARIN SODIUM 5000 UNITS: 5000 INJECTION, SOLUTION INTRAVENOUS; SUBCUTANEOUS at 05:25

## 2017-05-12 ENCOUNTER — APPOINTMENT (OUTPATIENT)
Dept: GENERAL RADIOLOGY | Facility: HOSPITAL | Age: 72
End: 2017-05-12

## 2017-05-12 LAB
GLUCOSE BLDC GLUCOMTR-MCNC: 133 MG/DL (ref 70–130)
GLUCOSE BLDC GLUCOMTR-MCNC: 133 MG/DL (ref 70–130)
GLUCOSE BLDC GLUCOMTR-MCNC: 165 MG/DL (ref 70–130)
GLUCOSE BLDC GLUCOMTR-MCNC: 215 MG/DL (ref 70–130)

## 2017-05-12 PROCEDURE — 94799 UNLISTED PULMONARY SVC/PX: CPT

## 2017-05-12 PROCEDURE — 63710000001 INSULIN ASPART PER 5 UNITS: Performed by: NURSE PRACTITIONER

## 2017-05-12 PROCEDURE — 99024 POSTOP FOLLOW-UP VISIT: CPT | Performed by: NURSE PRACTITIONER

## 2017-05-12 PROCEDURE — 97110 THERAPEUTIC EXERCISES: CPT

## 2017-05-12 PROCEDURE — 71010 HC CHEST PA OR AP: CPT

## 2017-05-12 PROCEDURE — 25010000002 HEPARIN (PORCINE) PER 1000 UNITS: Performed by: THORACIC SURGERY (CARDIOTHORACIC VASCULAR SURGERY)

## 2017-05-12 PROCEDURE — 25010000002 MORPHINE SULFATE (PF) 2 MG/ML SOLUTION: Performed by: THORACIC SURGERY (CARDIOTHORACIC VASCULAR SURGERY)

## 2017-05-12 PROCEDURE — 82962 GLUCOSE BLOOD TEST: CPT

## 2017-05-12 PROCEDURE — 25010000002 ONDANSETRON PER 1 MG: Performed by: THORACIC SURGERY (CARDIOTHORACIC VASCULAR SURGERY)

## 2017-05-12 RX ADMIN — MORPHINE SULFATE 2 MG: 2 INJECTION, SOLUTION INTRAMUSCULAR; INTRAVENOUS at 23:51

## 2017-05-12 RX ADMIN — LISINOPRIL 2.5 MG: 2.5 TABLET ORAL at 09:26

## 2017-05-12 RX ADMIN — INSULIN ASPART 4 UNITS: 100 INJECTION, SOLUTION INTRAVENOUS; SUBCUTANEOUS at 20:59

## 2017-05-12 RX ADMIN — METFORMIN HYDROCHLORIDE 750 MG: 500 TABLET ORAL at 17:56

## 2017-05-12 RX ADMIN — ATENOLOL 50 MG: 50 TABLET ORAL at 17:59

## 2017-05-12 RX ADMIN — Medication 5 ML/HR: at 13:00

## 2017-05-12 RX ADMIN — HEPARIN SODIUM 5000 UNITS: 5000 INJECTION, SOLUTION INTRAVENOUS; SUBCUTANEOUS at 15:00

## 2017-05-12 RX ADMIN — Medication 100 UNITS: at 09:27

## 2017-05-12 RX ADMIN — GLIPIZIDE 5 MG: 5 TABLET ORAL at 18:00

## 2017-05-12 RX ADMIN — ACETAMINOPHEN 650 MG: 325 TABLET ORAL at 20:58

## 2017-05-12 RX ADMIN — GLIPIZIDE 10 MG: 10 TABLET ORAL at 09:26

## 2017-05-12 RX ADMIN — DOCUSATE SODIUM 100 MG: 100 CAPSULE, LIQUID FILLED ORAL at 09:26

## 2017-05-12 RX ADMIN — HEPARIN SODIUM 5000 UNITS: 5000 INJECTION, SOLUTION INTRAVENOUS; SUBCUTANEOUS at 05:07

## 2017-05-12 RX ADMIN — CHOLECALCIFEROL TAB 10 MCG (400 UNIT) 400 UNITS: 10 TAB at 09:27

## 2017-05-12 RX ADMIN — HYDROCODONE BITARTRATE AND ACETAMINOPHEN 1 TABLET: 7.5; 325 TABLET ORAL at 09:26

## 2017-05-12 RX ADMIN — TRIAMTERENE AND HYDROCHLOROTHIAZIDE 2 CAPSULE: 37.5; 25 CAPSULE ORAL at 09:26

## 2017-05-12 RX ADMIN — HEPARIN SODIUM 5000 UNITS: 5000 INJECTION, SOLUTION INTRAVENOUS; SUBCUTANEOUS at 20:58

## 2017-05-12 RX ADMIN — CLOPIDOGREL 75 MG: 75 TABLET, FILM COATED ORAL at 20:58

## 2017-05-12 RX ADMIN — ONDANSETRON 4 MG: 2 INJECTION INTRAMUSCULAR; INTRAVENOUS at 16:15

## 2017-05-12 RX ADMIN — ASCORBIC ACID TAB 250 MG 250 MG: 250 TAB at 09:27

## 2017-05-12 RX ADMIN — INSULIN ASPART 2 UNITS: 100 INJECTION, SOLUTION INTRAVENOUS; SUBCUTANEOUS at 12:00

## 2017-05-12 RX ADMIN — ATENOLOL 50 MG: 50 TABLET ORAL at 09:27

## 2017-05-12 RX ADMIN — METFORMIN HYDROCHLORIDE 750 MG: 500 TABLET ORAL at 09:25

## 2017-05-12 RX ADMIN — ROSUVASTATIN CALCIUM 20 MG: 20 TABLET, FILM COATED ORAL at 20:58

## 2017-05-13 ENCOUNTER — APPOINTMENT (OUTPATIENT)
Dept: GENERAL RADIOLOGY | Facility: HOSPITAL | Age: 72
End: 2017-05-13

## 2017-05-13 LAB
GLUCOSE BLDC GLUCOMTR-MCNC: 108 MG/DL (ref 70–130)
GLUCOSE BLDC GLUCOMTR-MCNC: 133 MG/DL (ref 70–130)
GLUCOSE BLDC GLUCOMTR-MCNC: 161 MG/DL (ref 70–130)
GLUCOSE BLDC GLUCOMTR-MCNC: 174 MG/DL (ref 70–130)

## 2017-05-13 PROCEDURE — 82962 GLUCOSE BLOOD TEST: CPT

## 2017-05-13 PROCEDURE — 97116 GAIT TRAINING THERAPY: CPT | Performed by: PHYSICAL THERAPIST

## 2017-05-13 PROCEDURE — 63710000001 INSULIN ASPART PER 5 UNITS: Performed by: NURSE PRACTITIONER

## 2017-05-13 PROCEDURE — 71010 HC CHEST PA OR AP: CPT

## 2017-05-13 PROCEDURE — 25010000002 HEPARIN (PORCINE) PER 1000 UNITS: Performed by: THORACIC SURGERY (CARDIOTHORACIC VASCULAR SURGERY)

## 2017-05-13 RX ADMIN — ASCORBIC ACID TAB 250 MG 250 MG: 250 TAB at 08:31

## 2017-05-13 RX ADMIN — Medication 100 UNITS: at 08:31

## 2017-05-13 RX ADMIN — INSULIN ASPART 2 UNITS: 100 INJECTION, SOLUTION INTRAVENOUS; SUBCUTANEOUS at 12:06

## 2017-05-13 RX ADMIN — CHOLECALCIFEROL TAB 10 MCG (400 UNIT) 400 UNITS: 10 TAB at 08:31

## 2017-05-13 RX ADMIN — HEPARIN SODIUM 5000 UNITS: 5000 INJECTION, SOLUTION INTRAVENOUS; SUBCUTANEOUS at 15:09

## 2017-05-13 RX ADMIN — METFORMIN HYDROCHLORIDE 750 MG: 500 TABLET ORAL at 08:31

## 2017-05-13 RX ADMIN — INSULIN ASPART 2 UNITS: 100 INJECTION, SOLUTION INTRAVENOUS; SUBCUTANEOUS at 20:47

## 2017-05-13 RX ADMIN — TRIAMTERENE AND HYDROCHLOROTHIAZIDE 2 CAPSULE: 37.5; 25 CAPSULE ORAL at 08:31

## 2017-05-13 RX ADMIN — HEPARIN SODIUM 5000 UNITS: 5000 INJECTION, SOLUTION INTRAVENOUS; SUBCUTANEOUS at 23:04

## 2017-05-13 RX ADMIN — ATENOLOL 50 MG: 50 TABLET ORAL at 08:31

## 2017-05-13 RX ADMIN — HEPARIN SODIUM 5000 UNITS: 5000 INJECTION, SOLUTION INTRAVENOUS; SUBCUTANEOUS at 06:00

## 2017-05-13 RX ADMIN — CLOPIDOGREL 75 MG: 75 TABLET, FILM COATED ORAL at 20:47

## 2017-05-13 RX ADMIN — ROSUVASTATIN CALCIUM 20 MG: 20 TABLET, FILM COATED ORAL at 20:47

## 2017-05-13 RX ADMIN — LISINOPRIL 2.5 MG: 2.5 TABLET ORAL at 08:31

## 2017-05-13 RX ADMIN — ATENOLOL 50 MG: 50 TABLET ORAL at 17:37

## 2017-05-13 RX ADMIN — GLIPIZIDE 5 MG: 5 TABLET ORAL at 17:37

## 2017-05-13 RX ADMIN — GLIPIZIDE 10 MG: 10 TABLET ORAL at 08:31

## 2017-05-13 RX ADMIN — METFORMIN HYDROCHLORIDE 750 MG: 500 TABLET ORAL at 17:37

## 2017-05-14 ENCOUNTER — APPOINTMENT (OUTPATIENT)
Dept: GENERAL RADIOLOGY | Facility: HOSPITAL | Age: 72
End: 2017-05-14

## 2017-05-14 LAB
GLUCOSE BLDC GLUCOMTR-MCNC: 116 MG/DL (ref 70–130)
GLUCOSE BLDC GLUCOMTR-MCNC: 138 MG/DL (ref 70–130)
GLUCOSE BLDC GLUCOMTR-MCNC: 151 MG/DL (ref 70–130)
GLUCOSE BLDC GLUCOMTR-MCNC: 195 MG/DL (ref 70–130)

## 2017-05-14 PROCEDURE — 63710000001 INSULIN ASPART PER 5 UNITS: Performed by: NURSE PRACTITIONER

## 2017-05-14 PROCEDURE — 97116 GAIT TRAINING THERAPY: CPT | Performed by: PHYSICAL THERAPIST

## 2017-05-14 PROCEDURE — 71010 HC CHEST PA OR AP: CPT

## 2017-05-14 PROCEDURE — 82962 GLUCOSE BLOOD TEST: CPT

## 2017-05-14 PROCEDURE — 25010000002 HEPARIN (PORCINE) PER 1000 UNITS: Performed by: THORACIC SURGERY (CARDIOTHORACIC VASCULAR SURGERY)

## 2017-05-14 PROCEDURE — 25010000002 MORPHINE SULFATE (PF) 2 MG/ML SOLUTION: Performed by: THORACIC SURGERY (CARDIOTHORACIC VASCULAR SURGERY)

## 2017-05-14 RX ADMIN — GLIPIZIDE 10 MG: 10 TABLET ORAL at 08:18

## 2017-05-14 RX ADMIN — HEPARIN SODIUM 5000 UNITS: 5000 INJECTION, SOLUTION INTRAVENOUS; SUBCUTANEOUS at 14:59

## 2017-05-14 RX ADMIN — HEPARIN SODIUM 5000 UNITS: 5000 INJECTION, SOLUTION INTRAVENOUS; SUBCUTANEOUS at 06:57

## 2017-05-14 RX ADMIN — TRIAMTERENE AND HYDROCHLOROTHIAZIDE 2 CAPSULE: 37.5; 25 CAPSULE ORAL at 08:18

## 2017-05-14 RX ADMIN — HEPARIN SODIUM 5000 UNITS: 5000 INJECTION, SOLUTION INTRAVENOUS; SUBCUTANEOUS at 21:51

## 2017-05-14 RX ADMIN — LISINOPRIL 2.5 MG: 2.5 TABLET ORAL at 08:17

## 2017-05-14 RX ADMIN — Medication 100 UNITS: at 08:17

## 2017-05-14 RX ADMIN — INSULIN ASPART 2 UNITS: 100 INJECTION, SOLUTION INTRAVENOUS; SUBCUTANEOUS at 21:51

## 2017-05-14 RX ADMIN — MORPHINE SULFATE 2 MG: 2 INJECTION, SOLUTION INTRAMUSCULAR; INTRAVENOUS at 00:36

## 2017-05-14 RX ADMIN — INSULIN ASPART 2 UNITS: 100 INJECTION, SOLUTION INTRAVENOUS; SUBCUTANEOUS at 11:50

## 2017-05-14 RX ADMIN — ATENOLOL 50 MG: 50 TABLET ORAL at 17:48

## 2017-05-14 RX ADMIN — ROSUVASTATIN CALCIUM 20 MG: 20 TABLET, FILM COATED ORAL at 20:12

## 2017-05-14 RX ADMIN — METFORMIN HYDROCHLORIDE 750 MG: 500 TABLET ORAL at 17:48

## 2017-05-14 RX ADMIN — MORPHINE SULFATE 2 MG: 2 INJECTION, SOLUTION INTRAMUSCULAR; INTRAVENOUS at 23:05

## 2017-05-14 RX ADMIN — METFORMIN HYDROCHLORIDE 750 MG: 500 TABLET ORAL at 08:18

## 2017-05-14 RX ADMIN — ATENOLOL 50 MG: 50 TABLET ORAL at 08:17

## 2017-05-14 RX ADMIN — CLOPIDOGREL 75 MG: 75 TABLET, FILM COATED ORAL at 20:12

## 2017-05-14 RX ADMIN — ASCORBIC ACID TAB 250 MG 250 MG: 250 TAB at 08:17

## 2017-05-14 RX ADMIN — CHOLECALCIFEROL TAB 10 MCG (400 UNIT) 400 UNITS: 10 TAB at 08:22

## 2017-05-14 RX ADMIN — GLIPIZIDE 5 MG: 5 TABLET ORAL at 17:48

## 2017-05-15 ENCOUNTER — APPOINTMENT (OUTPATIENT)
Dept: GENERAL RADIOLOGY | Facility: HOSPITAL | Age: 72
End: 2017-05-15

## 2017-05-15 VITALS
TEMPERATURE: 98.4 F | DIASTOLIC BLOOD PRESSURE: 57 MMHG | WEIGHT: 306 LBS | RESPIRATION RATE: 18 BRPM | HEIGHT: 73 IN | SYSTOLIC BLOOD PRESSURE: 122 MMHG | OXYGEN SATURATION: 97 % | BODY MASS INDEX: 40.56 KG/M2 | HEART RATE: 58 BPM

## 2017-05-15 LAB
GLUCOSE BLDC GLUCOMTR-MCNC: 119 MG/DL (ref 70–130)
GLUCOSE BLDC GLUCOMTR-MCNC: 187 MG/DL (ref 70–130)

## 2017-05-15 PROCEDURE — 71010 HC CHEST PA OR AP: CPT

## 2017-05-15 PROCEDURE — 82962 GLUCOSE BLOOD TEST: CPT

## 2017-05-15 PROCEDURE — 25010000002 HEPARIN (PORCINE) PER 1000 UNITS: Performed by: THORACIC SURGERY (CARDIOTHORACIC VASCULAR SURGERY)

## 2017-05-15 PROCEDURE — 63710000001 INSULIN ASPART PER 5 UNITS: Performed by: NURSE PRACTITIONER

## 2017-05-15 RX ORDER — HYDROCODONE BITARTRATE AND ACETAMINOPHEN 7.5; 325 MG/1; MG/1
1 TABLET ORAL EVERY 6 HOURS PRN
Qty: 30 TABLET | Refills: 0 | Status: SHIPPED | OUTPATIENT
Start: 2017-05-15 | End: 2017-05-18

## 2017-05-15 RX ADMIN — LISINOPRIL 2.5 MG: 2.5 TABLET ORAL at 08:03

## 2017-05-15 RX ADMIN — ASCORBIC ACID TAB 250 MG 250 MG: 250 TAB at 08:03

## 2017-05-15 RX ADMIN — HEPARIN SODIUM 5000 UNITS: 5000 INJECTION, SOLUTION INTRAVENOUS; SUBCUTANEOUS at 06:12

## 2017-05-15 RX ADMIN — CHOLECALCIFEROL TAB 10 MCG (400 UNIT) 400 UNITS: 10 TAB at 08:03

## 2017-05-15 RX ADMIN — INSULIN ASPART 2 UNITS: 100 INJECTION, SOLUTION INTRAVENOUS; SUBCUTANEOUS at 12:20

## 2017-05-15 RX ADMIN — GLIPIZIDE 10 MG: 10 TABLET ORAL at 08:03

## 2017-05-15 RX ADMIN — METFORMIN HYDROCHLORIDE 750 MG: 500 TABLET ORAL at 08:02

## 2017-05-15 RX ADMIN — TRIAMTERENE AND HYDROCHLOROTHIAZIDE 2 CAPSULE: 37.5; 25 CAPSULE ORAL at 08:03

## 2017-05-15 RX ADMIN — Medication 100 UNITS: at 08:03

## 2017-05-15 RX ADMIN — ATENOLOL 50 MG: 50 TABLET ORAL at 08:03

## 2017-05-16 ENCOUNTER — LAB REQUISITION (OUTPATIENT)
Dept: LAB | Facility: HOSPITAL | Age: 72
End: 2017-05-16

## 2017-05-16 DIAGNOSIS — J90 PLEURAL EFFUSION: Primary | ICD-10-CM

## 2017-05-16 DIAGNOSIS — Z00.00 ENCOUNTER FOR GENERAL ADULT MEDICAL EXAMINATION WITHOUT ABNORMAL FINDINGS: ICD-10-CM

## 2017-05-16 LAB — HBA1C MFR BLD: 5.41 % (ref 4.8–5.6)

## 2017-05-16 PROCEDURE — 83036 HEMOGLOBIN GLYCOSYLATED A1C: CPT | Performed by: FAMILY MEDICINE

## 2017-05-30 ENCOUNTER — OFFICE VISIT (OUTPATIENT)
Dept: SURGERY | Facility: CLINIC | Age: 72
End: 2017-05-30

## 2017-05-30 ENCOUNTER — HOSPITAL ENCOUNTER (OUTPATIENT)
Dept: GENERAL RADIOLOGY | Facility: HOSPITAL | Age: 72
Discharge: HOME | End: 2017-05-30
Attending: THORACIC SURGERY (CARDIOTHORACIC VASCULAR SURGERY) | Admitting: THORACIC SURGERY (CARDIOTHORACIC VASCULAR SURGERY)

## 2017-05-30 VITALS
SYSTOLIC BLOOD PRESSURE: 132 MMHG | HEART RATE: 64 BPM | OXYGEN SATURATION: 96 % | RESPIRATION RATE: 18 BRPM | HEIGHT: 73 IN | WEIGHT: 300 LBS | DIASTOLIC BLOOD PRESSURE: 60 MMHG | BODY MASS INDEX: 39.76 KG/M2

## 2017-05-30 DIAGNOSIS — J90 PLEURAL EFFUSION: ICD-10-CM

## 2017-05-30 DIAGNOSIS — J90 PLEURAL EFFUSION, RIGHT: Primary | ICD-10-CM

## 2017-05-30 DIAGNOSIS — Z09 FOLLOW-UP EXAMINATION FOLLOWING SURGERY: ICD-10-CM

## 2017-05-30 PROCEDURE — 71020 HC CHEST PA AND LATERAL: CPT

## 2017-05-30 PROCEDURE — 99024 POSTOP FOLLOW-UP VISIT: CPT | Performed by: THORACIC SURGERY (CARDIOTHORACIC VASCULAR SURGERY)

## 2017-06-19 RX ORDER — CLOPIDOGREL BISULFATE 75 MG/1
TABLET ORAL
Qty: 90 TABLET | Refills: 0 | Status: SHIPPED | OUTPATIENT
Start: 2017-06-19 | End: 2017-09-06 | Stop reason: SDUPTHER

## 2017-06-19 RX ORDER — ATORVASTATIN CALCIUM 40 MG/1
TABLET, FILM COATED ORAL
Qty: 90 TABLET | Refills: 0 | Status: SHIPPED | OUTPATIENT
Start: 2017-06-19 | End: 2017-09-06 | Stop reason: SDUPTHER

## 2017-06-19 RX ORDER — GLIPIZIDE 5 MG/1
TABLET ORAL
Qty: 270 TABLET | Refills: 0 | Status: SHIPPED | OUTPATIENT
Start: 2017-06-19 | End: 2017-09-06 | Stop reason: SDUPTHER

## 2017-06-19 RX ORDER — METFORMIN HYDROCHLORIDE 750 MG/1
TABLET, EXTENDED RELEASE ORAL
Qty: 180 TABLET | Refills: 0 | Status: SHIPPED | OUTPATIENT
Start: 2017-06-19 | End: 2017-09-06 | Stop reason: SDUPTHER

## 2017-06-19 RX ORDER — TRIAMTERENE AND HYDROCHLOROTHIAZIDE 37.5; 25 MG/1; MG/1
CAPSULE ORAL
Qty: 180 CAPSULE | Refills: 0 | Status: SHIPPED | OUTPATIENT
Start: 2017-06-19 | End: 2017-09-06 | Stop reason: SDUPTHER

## 2017-06-19 RX ORDER — LISINOPRIL 2.5 MG/1
TABLET ORAL
Qty: 90 TABLET | Refills: 0 | Status: SHIPPED | OUTPATIENT
Start: 2017-06-19 | End: 2017-09-06 | Stop reason: SDUPTHER

## 2017-07-10 ENCOUNTER — OFFICE VISIT (OUTPATIENT)
Dept: FAMILY MEDICINE CLINIC | Facility: CLINIC | Age: 72
End: 2017-07-10

## 2017-07-10 VITALS
HEIGHT: 73 IN | SYSTOLIC BLOOD PRESSURE: 118 MMHG | BODY MASS INDEX: 40.16 KG/M2 | OXYGEN SATURATION: 95 % | DIASTOLIC BLOOD PRESSURE: 68 MMHG | WEIGHT: 303 LBS | HEART RATE: 87 BPM | TEMPERATURE: 98.4 F

## 2017-07-10 DIAGNOSIS — I35.0 AORTIC STENOSIS, MODERATE: Primary | ICD-10-CM

## 2017-07-10 DIAGNOSIS — E08.00 DIABETES MELLITUS DUE TO UNDERLYING CONDITION WITH HYPEROSMOLARITY WITHOUT COMA, WITHOUT LONG-TERM CURRENT USE OF INSULIN (HCC): ICD-10-CM

## 2017-07-10 DIAGNOSIS — I65.23 CAROTID STENOSIS, ASYMPTOMATIC, BILATERAL: ICD-10-CM

## 2017-07-10 PROCEDURE — 99213 OFFICE O/P EST LOW 20 MIN: CPT | Performed by: FAMILY MEDICINE

## 2017-07-10 RX ORDER — ATENOLOL 50 MG/1
50 TABLET ORAL 2 TIMES DAILY
Qty: 180 TABLET | Refills: 1 | Status: SHIPPED | OUTPATIENT
Start: 2017-07-10 | End: 2017-11-10 | Stop reason: SDUPTHER

## 2017-07-10 NOTE — PROGRESS NOTES
"  Chief Complaint   Patient presents with   • Diabetes     type 2 follow up ,pt doing well no complains       Subjective.../HPI  Patient present today with dm. fbs . Doing well on meds  - seeing dr burt for aortic stenosis. Saw him on 5/2/17.   -follows up with dr serrano in sept 2017 for his carotid stenosis. 60-70 % right and 50-60 on left  I have reviewed the patient's medical history in detail and updated the computerized patient record.    Family History   Problem Relation Age of Onset   • Hypertension Father        Social History     Social History   • Marital status:      Spouse name: N/A   • Number of children: N/A   • Years of education: N/A     Occupational History   • Not on file.     Social History Main Topics   • Smoking status: Never Smoker   • Smokeless tobacco: Never Used   • Alcohol use Yes      Comment: socially   • Drug use: No   • Sexual activity: Defer     Other Topics Concern   • Not on file     Social History Narrative       Review of Systems:   Review of Systems   Constitutional: Negative.    HENT: Negative.    Respiratory: Positive for shortness of breath.    Cardiovascular: Positive for leg swelling.   Endocrine: Negative.    Genitourinary: Negative.    Musculoskeletal: Negative.    Skin: Negative.    Allergic/Immunologic: Positive for environmental allergies.   Neurological: Negative.    Hematological: Negative.    Psychiatric/Behavioral: Negative.        Objective:  Vital Signs     Vitals:    07/10/17 1322   BP: 118/68   BP Location: Left arm   Patient Position: Sitting   Pulse: 87   Temp: 98.4 °F (36.9 °C)   TempSrc: Oral   SpO2: 95%   Weight: (!) 303 lb (137 kg)   Height: 73\" (185.4 cm)     Physical Exam   Constitutional: He is oriented to person, place, and time. He appears well-developed and well-nourished.   obesity   HENT:   Head: Normocephalic and atraumatic.   Eyes: Pupils are equal, round, and reactive to light.   Neck: Normal range of motion. Neck supple.   Bruit " vs transmitted murmur bilat- aortic stenosis transmitted murmur vs carotid bruit or both   Cardiovascular: Normal rate and regular rhythm.    Murmur (grade 2-3/6 sys murmur- aortic stenosis) heard.  Pulmonary/Chest: Effort normal and breath sounds normal.   Abdominal: Soft. Bowel sounds are normal.   Musculoskeletal: Normal range of motion. He exhibits deformity (deformity of feet due to dm and surgery).   Neurological: He is alert and oriented to person, place, and time.   Skin: Skin is warm and dry.        Results Review:      REVIEWED AND DISCUSSED CLINICAL RESULTS WITH PATIENT                          Current Outpatient Prescriptions:   •  atenolol (TENORMIN) 50 MG tablet, Take 1 tablet by mouth 2 (Two) Times a Day., Disp: 180 tablet, Rfl: 1  •  atorvastatin (LIPITOR) 40 MG tablet, TAKE 1 TABLET DAILY, Disp: 90 tablet, Rfl: 0  •  Cholecalciferol (VITAMIN D3 PO), Take 1 capsule by mouth Daily. PT HOLDING FOR SURGERY, Disp: , Rfl:   •  clopidogrel (PLAVIX) 75 MG tablet, TAKE 1 TABLET DAILY, Disp: 90 tablet, Rfl: 0  •  COD LIVER OIL PO, Take 1 teaspoon(s) by mouth 2 (Two) Times a Day. PT HOLDING FOR SURGERY, Disp: , Rfl:   •  glipiZIDE (GLUCOTROL) 5 MG tablet, TAKE 2 TABLETS IN THE MORNING AND 1 TABLET AT 4 P.M., Disp: 270 tablet, Rfl: 0  •  lisinopril (PRINIVIL,ZESTRIL) 2.5 MG tablet, TAKE 1 TABLET DAILY, Disp: 90 tablet, Rfl: 0  •  metFORMIN ER (GLUCOPHAGE-XR) 750 MG 24 hr tablet, TAKE 1 TABLET TWICE A DAY, Disp: 180 tablet, Rfl: 0  •  Omega-3 Fatty Acids (FISH OIL) 1000 MG capsule capsule, Take 1,000 mg by mouth Daily With Breakfast. PT HOLDING FOR SURGERY, Disp: , Rfl:   •  triamterene-hydrochlorothiazide (DYAZIDE) 37.5-25 MG per capsule, TAKE 2 CAPSULES EVERY MORNING, Disp: 180 capsule, Rfl: 0  •  vitamin C (ASCORBIC ACID) 250 MG tablet, Take 250 mg by mouth Daily., Disp: , Rfl:   •  VITAMIN E PO, Take 1 capsule by mouth Daily. PT HOLDING FOR SURGERY, Disp: , Rfl:   •  glucose blood (CHOICE DM FORA G20 TEST  STRIPS) test strip, Use as instructed twice a day. Use accucheck amanda plus test strips, Disp: 200 each, Rfl: 1    Procedures    Assessment/Plan     Diagnoses and all orders for this visit:    Aortic stenosis, moderate    Diabetes mellitus due to underlying condition with hyperosmolarity without coma, without long-term current use of insulin    Carotid stenosis, asymptomatic, bilateral    Other orders  -     atenolol (TENORMIN) 50 MG tablet; Take 1 tablet by mouth 2 (Two) Times a Day.  -     glucose blood (CHOICE DM FORA G20 TEST STRIPS) test strip; Use as instructed twice a day.  Use accucheck amanda plus test strips       Saw dr burt in may 2017 and said that he will repeat echo and only had been minimal worsening.  Martín Oliveira Jr., MD  07/10/17  2:30 PM

## 2017-07-11 ENCOUNTER — HOSPITAL ENCOUNTER (OUTPATIENT)
Dept: PET IMAGING | Facility: HOSPITAL | Age: 72
Discharge: HOME | End: 2017-07-11
Attending: THORACIC SURGERY (CARDIOTHORACIC VASCULAR SURGERY) | Admitting: THORACIC SURGERY (CARDIOTHORACIC VASCULAR SURGERY)

## 2017-07-11 DIAGNOSIS — J90 PLEURAL EFFUSION, RIGHT: ICD-10-CM

## 2017-07-11 DIAGNOSIS — Z09 FOLLOW-UP EXAMINATION FOLLOWING SURGERY: ICD-10-CM

## 2017-07-11 PROCEDURE — 71250 CT THORAX DX C-: CPT

## 2017-07-18 ENCOUNTER — OFFICE VISIT (OUTPATIENT)
Dept: SURGERY | Facility: CLINIC | Age: 72
End: 2017-07-18

## 2017-07-18 VITALS
WEIGHT: 300 LBS | SYSTOLIC BLOOD PRESSURE: 134 MMHG | OXYGEN SATURATION: 95 % | BODY MASS INDEX: 39.76 KG/M2 | DIASTOLIC BLOOD PRESSURE: 70 MMHG | HEIGHT: 73 IN | HEART RATE: 58 BPM | RESPIRATION RATE: 16 BRPM

## 2017-07-18 DIAGNOSIS — Z09 FOLLOW-UP EXAMINATION FOLLOWING SURGERY: ICD-10-CM

## 2017-07-18 DIAGNOSIS — J90 PLEURAL EFFUSION, RIGHT: Primary | ICD-10-CM

## 2017-07-18 PROCEDURE — 99024 POSTOP FOLLOW-UP VISIT: CPT | Performed by: THORACIC SURGERY (CARDIOTHORACIC VASCULAR SURGERY)

## 2017-07-18 NOTE — PROGRESS NOTES
Subjective   Patient ID: Rock Maciel is a 71 y.o. male who returns to the office today or a 6 week post op visit after a ct chest without contrast.    History of Present Illness  Dear Colleagues,   Rock Maciel is here today for further follow-up of a right VAT with decortication of the right lung performed May 8, 2017.  Since his last visit here he has done very well.  He is experiencing no significant shortness of breath.  His shortness of breath is much improved over his preoperative status. He has had no cough or hemoptysis.  He's had no pleuritic pain.  He has no chest wall pain, numbness or paresthesias. He has resumed all of his normal activities and is tolerating this without problem    The following portions of the patient's history were reviewed and updated as appropriate: allergies, current medications, past family history, past medical history, past social history, past surgical history and problem list.  Review of Systems   Constitution: Negative.   HENT: Negative.    Eyes: Negative.    Cardiovascular: Negative.    Respiratory: Negative.    Endocrine: Negative.    Hematologic/Lymphatic: Negative.    Skin: Negative.    Musculoskeletal: Negative.    Gastrointestinal: Negative.    Genitourinary: Negative.    Neurological: Negative.    Psychiatric/Behavioral: Negative.         Objective   Physical Exam   Constitutional: He is oriented to person, place, and time. He appears well-developed and well-nourished.   HENT:   Head: Normocephalic.   Eyes: Conjunctivae, EOM and lids are normal. Pupils are equal, round, and reactive to light.   Neck: Trachea normal and normal range of motion. Neck supple. No hepatojugular reflux and no JVD present. Carotid bruit is not present. No thyroid mass and no thyromegaly present.   Cardiovascular: Normal rate, regular rhythm, S1 normal, S2 normal, normal heart sounds and normal pulses.   No extrasystoles are present. PMI is not displaced.    Pulmonary/Chest: Effort  normal. He has decreased breath sounds in the right lower field.   All incisions are clean dry and well healed.  There are no subcutaneous masses or nodules.  Chest wall is stable   Abdominal: Soft. Normal appearance and bowel sounds are normal. He exhibits no mass. There is no hepatosplenomegaly. There is no tenderness. No hernia.   Musculoskeletal: Normal range of motion.   Neurological: He is alert and oriented to person, place, and time. He has normal strength and normal reflexes. No cranial nerve deficit or sensory deficit. He displays a negative Romberg sign.   Skin: Skin is warm, dry and intact.   Psychiatric: He has a normal mood and affect. His speech is normal and behavior is normal. Judgment and thought content normal. Cognition and memory are normal.       Assessment/Plan   Independent Review of Radiographic Studies:    CT scan of the chest performed July 11, 2017 was independently reviewed and compared to the previous x-rays.  The previous right hydropneumothorax has resolved.  There is some pleural thickening and scarring secondary to surgical intervention.  There is some rounded atelectasis in the right lower lobe. There  is mild increase in size of the right paratracheal nodes.  Left side is clear.      Assessment/Plan: The patient has made a good recovery from his surgery and has had a good result.  His preoperative symptoms of shortness of breath have resolved.  I believe that the lymphadenopathy seen on this CT scan is most likely reactive. I do think we need to follow-up on this with another scan in 6 months.  I discussed this with the patient and made an appointment for him to return in 6 months with a CT of the chest without contrast.  I will be glad to see him sooner if you think it's necessary.  Thank you for allowing me to participate in the care Mr. Maciel.    Diagnoses and all orders for this visit:    Pleural effusion, right  -     CT Chest Without Contrast; Future    Follow-up  examination following surgery  -     CT Chest Without Contrast; Future

## 2017-08-08 ENCOUNTER — HOSPITAL ENCOUNTER (OUTPATIENT)
Dept: SLEEP MEDICINE | Facility: HOSPITAL | Age: 72
Discharge: HOME | End: 2017-08-08
Admitting: PSYCHIATRY & NEUROLOGY

## 2017-08-08 PROCEDURE — G0463 HOSPITAL OUTPT CLINIC VISIT: HCPCS

## 2017-08-08 NOTE — PROGRESS NOTES
Rock Maciel  1945  71 y.o.     DATE OF SERVICE:  8/8/2017       HISTORY OF PRESENT ILLNESS:   The patient has severe obstructive sleep apnea syndrome with apnea-hypopnea index of 60 per sleep hour, minimum SpO2 of 66% with severe hypoxemic desaturations and percent of sleep time.     I have reviewed compliance data.  His compliance data card could not be extracted from the machine and it probably needs to be repaired or replaced.  He is on auto CPAP therapy with a mean pressure of 9.5 cm water with excellent compliance and 100% usage with an average usage of 7 hours and 56 minutes and AHI down to 0.9. Prior to CPAP therapy, he  was very sleepy with Dodson Sleepiness Scale score of 14, with CPAP therapy it is down to 0. He is compliant and benefiting from it.     PHYSICAL EXAMINATION:  VITAL SIGNS: Weight 305 pounds, height 73 inches, body mass index 40, neck collar size 21 inches. Blood pressure 160/74 and heart rate 53.   HEENT: Normal.   NECK: Supple. No bruits.   CARDIAC: Normal.   LUNGS: Clear to auscultation.   EXTREMITIES: No edema.     IMPRESSION: Patient with severe obstructive sleep apnea syndrome successfully treated with auto CPAP therapy and is compliant and benefiting from it.     RECOMMENDATIONS: We will continue auto CPAP therapy.  We'll request for repeating a replacing his auto CPAP machine.   Followup in 1 year.       Andrés Fuentes M.D.  8/8/2017

## 2017-09-06 RX ORDER — TRIAMTERENE AND HYDROCHLOROTHIAZIDE 37.5; 25 MG/1; MG/1
CAPSULE ORAL
Qty: 180 CAPSULE | Refills: 0 | Status: SHIPPED | OUTPATIENT
Start: 2017-09-06 | End: 2017-11-10 | Stop reason: SDUPTHER

## 2017-09-06 RX ORDER — LISINOPRIL 2.5 MG/1
TABLET ORAL
Qty: 90 TABLET | Refills: 0 | Status: SHIPPED | OUTPATIENT
Start: 2017-09-06 | End: 2017-11-10 | Stop reason: SDUPTHER

## 2017-09-06 RX ORDER — CLOPIDOGREL BISULFATE 75 MG/1
TABLET ORAL
Qty: 90 TABLET | Refills: 0 | Status: SHIPPED | OUTPATIENT
Start: 2017-09-06 | End: 2017-11-10 | Stop reason: SDUPTHER

## 2017-09-06 RX ORDER — ATORVASTATIN CALCIUM 40 MG/1
TABLET, FILM COATED ORAL
Qty: 90 TABLET | Refills: 0 | Status: SHIPPED | OUTPATIENT
Start: 2017-09-06 | End: 2017-11-10 | Stop reason: SDUPTHER

## 2017-09-06 RX ORDER — METFORMIN HYDROCHLORIDE 750 MG/1
TABLET, EXTENDED RELEASE ORAL
Qty: 180 TABLET | Refills: 0 | Status: SHIPPED | OUTPATIENT
Start: 2017-09-06 | End: 2017-11-10 | Stop reason: SDUPTHER

## 2017-09-06 RX ORDER — GLIPIZIDE 5 MG/1
TABLET ORAL
Qty: 270 TABLET | Refills: 0 | Status: SHIPPED | OUTPATIENT
Start: 2017-09-06 | End: 2017-11-10 | Stop reason: SDUPTHER

## 2017-11-01 ENCOUNTER — TELEPHONE (OUTPATIENT)
Dept: FAMILY MEDICINE CLINIC | Facility: CLINIC | Age: 72
End: 2017-11-01

## 2017-11-01 NOTE — TELEPHONE ENCOUNTER
----- Message from Charlie Forde sent at 11/1/2017 10:40 AM EDT -----  Contact: [phone]  [facility]  NEEDS 6 MONTHS OF OFFICE NOTES ABOUT PT DIABETICS FEET TO       FAX : [phone]        CONTACT JOSE LUIS IF ANY PROBLEMS   #[phone]

## 2017-11-10 ENCOUNTER — OFFICE VISIT (OUTPATIENT)
Dept: FAMILY MEDICINE CLINIC | Facility: CLINIC | Age: 72
End: 2017-11-10

## 2017-11-10 VITALS
DIASTOLIC BLOOD PRESSURE: 66 MMHG | WEIGHT: 315 LBS | SYSTOLIC BLOOD PRESSURE: 152 MMHG | OXYGEN SATURATION: 98 % | HEIGHT: 73 IN | TEMPERATURE: 98.1 F | BODY MASS INDEX: 41.75 KG/M2 | HEART RATE: 56 BPM

## 2017-11-10 DIAGNOSIS — E78.01 FAMILIAL HYPERCHOLESTEROLEMIA: ICD-10-CM

## 2017-11-10 DIAGNOSIS — E08.00 DIABETES MELLITUS DUE TO UNDERLYING CONDITION WITH HYPEROSMOLARITY WITHOUT COMA, WITHOUT LONG-TERM CURRENT USE OF INSULIN (HCC): ICD-10-CM

## 2017-11-10 DIAGNOSIS — I10 ESSENTIAL HYPERTENSION: ICD-10-CM

## 2017-11-10 DIAGNOSIS — H60.391 OTHER INFECTIVE ACUTE OTITIS EXTERNA OF RIGHT EAR: Primary | ICD-10-CM

## 2017-11-10 PROCEDURE — 99213 OFFICE O/P EST LOW 20 MIN: CPT | Performed by: FAMILY MEDICINE

## 2017-11-10 RX ORDER — CLOPIDOGREL BISULFATE 75 MG/1
75 TABLET ORAL DAILY
Qty: 90 TABLET | Refills: 1 | Status: SHIPPED | OUTPATIENT
Start: 2017-11-10 | End: 2018-03-30 | Stop reason: SDUPTHER

## 2017-11-10 RX ORDER — TRIAMTERENE AND HYDROCHLOROTHIAZIDE 37.5; 25 MG/1; MG/1
2 CAPSULE ORAL DAILY
Qty: 180 CAPSULE | Refills: 1 | Status: SHIPPED | OUTPATIENT
Start: 2017-11-10 | End: 2018-03-30 | Stop reason: SDUPTHER

## 2017-11-10 RX ORDER — METFORMIN HYDROCHLORIDE 750 MG/1
750 TABLET, EXTENDED RELEASE ORAL 2 TIMES DAILY
Qty: 180 TABLET | Refills: 1 | Status: SHIPPED | OUTPATIENT
Start: 2017-11-10 | End: 2018-03-30 | Stop reason: SDUPTHER

## 2017-11-10 RX ORDER — ATENOLOL 50 MG/1
50 TABLET ORAL 2 TIMES DAILY
Qty: 180 TABLET | Refills: 1 | Status: SHIPPED | OUTPATIENT
Start: 2017-11-10 | End: 2018-03-30 | Stop reason: SDUPTHER

## 2017-11-10 RX ORDER — ATORVASTATIN CALCIUM 40 MG/1
40 TABLET, FILM COATED ORAL NIGHTLY
Qty: 90 TABLET | Refills: 1 | Status: SHIPPED | OUTPATIENT
Start: 2017-11-10 | End: 2018-03-30 | Stop reason: SDUPTHER

## 2017-11-10 RX ORDER — GLIPIZIDE 5 MG/1
TABLET ORAL
Qty: 270 TABLET | Refills: 1 | Status: SHIPPED | OUTPATIENT
Start: 2017-11-10 | End: 2018-03-30 | Stop reason: SDUPTHER

## 2017-11-10 RX ORDER — LISINOPRIL 2.5 MG/1
2.5 TABLET ORAL DAILY
Qty: 90 TABLET | Refills: 1 | Status: SHIPPED | OUTPATIENT
Start: 2017-11-10 | End: 2018-03-30 | Stop reason: SDUPTHER

## 2017-11-10 NOTE — PROGRESS NOTES
"  Chief Complaint   Patient presents with   • Diabetes Mellitus     follow up pt is doing well complain about some tinnitus on right ear       Subjective.../HPI  Patient present today with diabetic feet and needs shoes    I have reviewed the patient's medical history in detail and updated the computerized patient record.    Family History   Problem Relation Age of Onset   • Hypertension Father        Social History     Social History   • Marital status:      Spouse name: N/A   • Number of children: N/A   • Years of education: N/A     Occupational History   • Not on file.     Social History Main Topics   • Smoking status: Never Smoker   • Smokeless tobacco: Never Used   • Alcohol use Yes      Comment: socially   • Drug use: No   • Sexual activity: Defer     Other Topics Concern   • Not on file     Social History Narrative       Review of Systems:   Review of Systems   Constitutional: Negative.    HENT: Positive for tinnitus (right ear).    Eyes: Negative.    Respiratory: Negative.    Cardiovascular: Negative.    Gastrointestinal: Negative.    Endocrine: Negative.    Genitourinary: Negative.    Musculoskeletal: Negative.    Skin: Negative.    Allergic/Immunologic: Negative.    Neurological: Negative.    Hematological: Negative.    Psychiatric/Behavioral: Negative.        Objective:  Vital Signs     Vitals:    11/10/17 1252   BP: 152/66   Pulse: 56   Temp: 98.1 °F (36.7 °C)   TempSrc: Oral   SpO2: 98%   Weight: (!) 318 lb 3.2 oz (144 kg)   Height: 73\" (185.4 cm)     Physical Exam   Constitutional: He is oriented to person, place, and time. He appears well-developed and well-nourished.   HENT:   Head: Normocephalic.   Left Ear: External ear normal.   Moist canal on right   Eyes: Pupils are equal, round, and reactive to light.   Neck: Normal range of motion.   Cardiovascular: Normal rate, regular rhythm and normal heart sounds.    Pulmonary/Chest: Effort normal.   Abdominal: Soft.   Musculoskeletal: Normal range " of motion.   Neurological: He is alert and oriented to person, place, and time.   Skin: Skin is warm and dry.        Results Review:      REVIEWED AND DISCUSSED CLINICAL RESULTS WITH PATIENT                          Current Outpatient Prescriptions:   •  atenolol (TENORMIN) 50 MG tablet, Take 1 tablet by mouth 2 (Two) Times a Day., Disp: 180 tablet, Rfl: 1  •  atorvastatin (LIPITOR) 40 MG tablet, Take 1 tablet by mouth Every Night., Disp: 90 tablet, Rfl: 1  •  Cholecalciferol (VITAMIN D3 PO), Take 1 capsule by mouth Daily. PT HOLDING FOR SURGERY, Disp: , Rfl:   •  clopidogrel (PLAVIX) 75 MG tablet, Take 1 tablet by mouth Daily., Disp: 90 tablet, Rfl: 1  •  COD LIVER OIL PO, Take 1 teaspoon(s) by mouth 2 (Two) Times a Day. PT HOLDING FOR SURGERY, Disp: , Rfl:   •  glipiZIDE (GLUCOTROL) 5 MG tablet, 2 in am and 1 at 5 pm, Disp: 270 tablet, Rfl: 1  •  glucose blood (CHOICE DM FORA G20 TEST STRIPS) test strip, Use as instructed twice a day.---Use accucheck amanda plus test strips, Disp: 200 each, Rfl: 1  •  lisinopril (PRINIVIL,ZESTRIL) 2.5 MG tablet, Take 1 tablet by mouth Daily., Disp: 90 tablet, Rfl: 1  •  metFORMIN ER (GLUCOPHAGE-XR) 750 MG 24 hr tablet, Take 1 tablet by mouth 2 (Two) Times a Day., Disp: 180 tablet, Rfl: 1  •  Omega-3 Fatty Acids (FISH OIL) 1000 MG capsule capsule, Take 1,000 mg by mouth Daily With Breakfast. PT HOLDING FOR SURGERY, Disp: , Rfl:   •  triamterene-hydrochlorothiazide (DYAZIDE) 37.5-25 MG per capsule, Take 2 capsules by mouth Daily., Disp: 180 capsule, Rfl: 1  •  vitamin C (ASCORBIC ACID) 250 MG tablet, Take 250 mg by mouth Daily., Disp: , Rfl:   •  VITAMIN E PO, Take 1 capsule by mouth Daily. PT HOLDING FOR SURGERY, Disp: , Rfl:   •  Lancets (ACCU-CHEK SOFT TOUCH) lancets, ACCU-CHEK SOFTCLIX LANCETS, Disp: 200 each, Rfl: 1  •  neomycin-polymyxin-hydrocortisone (CORTISPORIN) 3.5-42252-7 otic solution, Administer 3 drops to the right ear 4 (Four) Times a Day for 7 days., Disp: 10 mL,  Rfl: 1    Procedures    Assessment/Plan     Diagnoses and all orders for this visit:    Other infective acute otitis externa of right ear    Diabetes mellitus due to underlying condition with hyperosmolarity without coma, without long-term current use of insulin    Familial hypercholesterolemia    Essential hypertension    Other orders  -     atenolol (TENORMIN) 50 MG tablet; Take 1 tablet by mouth 2 (Two) Times a Day.  -     atorvastatin (LIPITOR) 40 MG tablet; Take 1 tablet by mouth Every Night.  -     clopidogrel (PLAVIX) 75 MG tablet; Take 1 tablet by mouth Daily.  -     glipiZIDE (GLUCOTROL) 5 MG tablet; 2 in am and 1 at 5 pm  -     lisinopril (PRINIVIL,ZESTRIL) 2.5 MG tablet; Take 1 tablet by mouth Daily.  -     glucose blood (CHOICE DM FORA G20 TEST STRIPS) test strip; Use as instructed twice a day.---Use accucheck amanda plus test strips  -     metFORMIN ER (GLUCOPHAGE-XR) 750 MG 24 hr tablet; Take 1 tablet by mouth 2 (Two) Times a Day.  -     triamterene-hydrochlorothiazide (DYAZIDE) 37.5-25 MG per capsule; Take 2 capsules by mouth Daily.  -     Lancets (ACCU-CHEK SOFT TOUCH) lancets; ACCU-CHEK SOFTCLIX LANCETS  -     neomycin-polymyxin-hydrocortisone (CORTISPORIN) 3.5-82894-7 otic solution; Administer 3 drops to the right ear 4 (Four) Times a Day for 7 days.         Martín Oliveira Jr., MD  11/10/17  1:54 PM

## 2017-11-20 ENCOUNTER — TELEPHONE (OUTPATIENT)
Dept: FAMILY MEDICINE CLINIC | Facility: CLINIC | Age: 72
End: 2017-11-20

## 2017-11-20 NOTE — TELEPHONE ENCOUNTER
----- Message from Emily Thakur MA sent at 11/20/2017 10:52 AM EST -----  Contact: [phone]      ----- Message -----     From: Charlie Forde     Sent: 11/20/2017  10:20 AM       To: [facility]    Pt would like a call back from Dr.Morgans BARCENAS, he was in on the 10th  For right ear pain. He was prescribed ear drops and he said it didn't work. So does he need to come in or does he need to be referred to a ear specialist?   Please advise.       Lsd: 11.10.17      PT IS AWARE TO ALLOW 24-48 HOURS FOR A CALL BACK AFTER 7PM.   THANK YOU

## 2017-11-21 DIAGNOSIS — H92.09 OTALGIA, UNSPECIFIED LATERALITY: Primary | ICD-10-CM

## 2018-01-18 ENCOUNTER — TRANSCRIBE ORDERS (OUTPATIENT)
Dept: ADMINISTRATIVE | Facility: HOSPITAL | Age: 73
End: 2018-01-18

## 2018-01-18 DIAGNOSIS — H93.A9 PULSATILE TINNITUS: Primary | ICD-10-CM

## 2018-01-23 ENCOUNTER — HOSPITAL ENCOUNTER (OUTPATIENT)
Dept: CT IMAGING | Facility: HOSPITAL | Age: 73
Discharge: HOME | End: 2018-01-23
Attending: OTOLARYNGOLOGY | Admitting: OTOLARYNGOLOGY

## 2018-01-23 DIAGNOSIS — H93.A9 PULSATILE TINNITUS: ICD-10-CM

## 2018-01-23 LAB — CREAT BLDA-MCNC: 1.8 MG/DL (ref 0.6–1.3)

## 2018-01-23 PROCEDURE — 0 IOPAMIDOL 61 % SOLUTION: Performed by: OTOLARYNGOLOGY

## 2018-01-23 PROCEDURE — 70496 CT ANGIOGRAPHY HEAD: CPT

## 2018-01-23 PROCEDURE — 82565 ASSAY OF CREATININE: CPT

## 2018-01-23 RX ADMIN — IOPAMIDOL 95 ML: 612 INJECTION, SOLUTION INTRAVENOUS at 13:31

## 2018-01-30 ENCOUNTER — HOSPITAL ENCOUNTER (OUTPATIENT)
Dept: PET IMAGING | Facility: HOSPITAL | Age: 73
Discharge: HOME | End: 2018-01-30
Attending: THORACIC SURGERY (CARDIOTHORACIC VASCULAR SURGERY) | Admitting: THORACIC SURGERY (CARDIOTHORACIC VASCULAR SURGERY)

## 2018-01-30 DIAGNOSIS — J90 PLEURAL EFFUSION, RIGHT: ICD-10-CM

## 2018-01-30 DIAGNOSIS — Z09 FOLLOW-UP EXAMINATION FOLLOWING SURGERY: ICD-10-CM

## 2018-01-30 PROCEDURE — 71250 CT THORAX DX C-: CPT

## 2018-02-06 ENCOUNTER — OFFICE VISIT (OUTPATIENT)
Dept: SURGERY | Facility: CLINIC | Age: 73
End: 2018-02-06

## 2018-02-06 VITALS
SYSTOLIC BLOOD PRESSURE: 154 MMHG | OXYGEN SATURATION: 95 % | WEIGHT: 315 LBS | HEART RATE: 52 BPM | BODY MASS INDEX: 41.75 KG/M2 | DIASTOLIC BLOOD PRESSURE: 69 MMHG | HEIGHT: 73 IN

## 2018-02-06 DIAGNOSIS — J90 PLEURAL EFFUSION, RIGHT: Primary | ICD-10-CM

## 2018-02-06 PROCEDURE — 99213 OFFICE O/P EST LOW 20 MIN: CPT | Performed by: THORACIC SURGERY (CARDIOTHORACIC VASCULAR SURGERY)

## 2018-02-06 NOTE — PROGRESS NOTES
Subjective   Patient ID: Rock Maciel is a 72 y.o. male is here today for follow-up.    History of Present Illness  Dear Colleague,  Rock Maciel was seen in our office today for further follow-up of right pleural effusion treated with right VAT and decortication performed May 8, 2017.  Since his last visit he has done very well.  He has had no dependent edema.  He has experienced no significant shortness of breath.  He has no pleuritic pain.  There has been no chest wall pain.  He has no cough or hemoptysis.    The following portions of the patient's history were reviewed and updated as appropriate: allergies, current medications, past family history, past medical history, past social history, past surgical history and problem list.  Review of Systems   Constitution: Negative.   HENT: Negative.    Eyes: Negative.    Cardiovascular: Negative.    Respiratory: Negative.    Endocrine: Negative.    Hematologic/Lymphatic: Negative.    Skin: Negative.    Musculoskeletal: Positive for joint pain.        Intermintent shoulder pain left side   Gastrointestinal: Negative.    Genitourinary: Negative.    Neurological: Negative.    Psychiatric/Behavioral: Negative.      Patient Active Problem List   Diagnosis   • Abnormal ECG   • Aortic heart valve narrowing   • Arteriosclerosis of coronary artery   • Cardiac murmur   • BP (high blood pressure)   • LAFB (left anterior fascicular block)   • Bundle branch block, right   • Neuropathic arthropathy   • Diabetes mellitus   • Accumulation of fluid in tissues   • Alimentary obesity   • Obstructive apnea   • Gain of weight   • Pleural effusion, right   • Decubitus ulcer of ankle   • Gout   • Morbid obesity   • Ulcer of ankle   • Foot ulcer   • Chronic venous insufficiency   • Aortic stenosis, moderate   • Carotid stenosis, asymptomatic   • Pleural effusion     Past Medical History:   Diagnosis Date   • Allergic rhinitis    • Bronchitis    • Coronary artery disease    • Diabetes  mellitus 2001   • DM (diabetes mellitus)    • Encounter for annual health examination 05/06/2014    Annual Health Assessment   • Gout    • Hiatal hernia    • History of MRSA infection     RIGHT FOOT 2010   • Hyperlipidemia    • Hypertension    • Murmur    • Pneumothorax on right    • Sleep apnea     pt wears CPAP at night   • Wellness examination 06/24/2015    Annual Wellness Visit     Past Surgical History:   Procedure Laterality Date   • ARTERY SURGERY Bilateral     carotid   • CAROTID ENDARTERECTOMY Bilateral    • COLONOSCOPY  2008   • FOOT SURGERY Right 2010    5th digit removal   • FOOT SURGERY Left 2011    1 digit removed   • THORACENTESIS Right 11/21/2016   • THORACOSCOPY Right 5/8/2017    Procedure: BRONCHOSCOPY, RIGHT VAT,  TOTAL DECORTICATION RIGHT LUNG, PLEURAL BX, PLACEMENT SUBPLEURAL PAIN CAATHETERS X2;  Surgeon: Donald Orlando III, MD;  Location: [facility] OR;  Service:    • TONSILECTOMY, ADENOIDECTOMY, BILATERAL MYRINGOTOMY AND TUBES       Family History   Problem Relation Age of Onset   • Hypertension Father      Social History     Social History   • Marital status:      Spouse name: N/A   • Number of children: N/A   • Years of education: N/A     Occupational History   • Not on file.     Social History Main Topics   • Smoking status: Never Smoker   • Smokeless tobacco: Never Used   • Alcohol use Yes      Comment: socially   • Drug use: No   • Sexual activity: Defer     Other Topics Concern   • Not on file     Social History Narrative       Current Outpatient Prescriptions:   •  atenolol (TENORMIN) 50 MG tablet, Take 1 tablet by mouth 2 (Two) Times a Day., Disp: 180 tablet, Rfl: 1  •  atorvastatin (LIPITOR) 40 MG tablet, Take 1 tablet by mouth Every Night., Disp: 90 tablet, Rfl: 1  •  Cholecalciferol (VITAMIN D3 PO), Take 1 capsule by mouth Daily. PT HOLDING FOR SURGERY, Disp: , Rfl:   •  clopidogrel (PLAVIX) 75 MG tablet, Take 1 tablet by mouth Daily., Disp: 90 tablet, Rfl: 1  •  COD LIVER  OIL PO, Take 1 teaspoon(s) by mouth 2 (Two) Times a Day. PT HOLDING FOR SURGERY, Disp: , Rfl:   •  glipiZIDE (GLUCOTROL) 5 MG tablet, 2 in am and 1 at 5 pm, Disp: 270 tablet, Rfl: 1  •  glucose blood (CHOICE DM FORA G20 TEST STRIPS) test strip, Use as instructed twice a day.---Use accucheck amanda plus test strips, Disp: 200 each, Rfl: 1  •  Lancets (ACCU-CHEK SOFT TOUCH) lancets, ACCU-CHEK SOFTCLIX LANCETS, Disp: 200 each, Rfl: 1  •  lisinopril (PRINIVIL,ZESTRIL) 2.5 MG tablet, Take 1 tablet by mouth Daily., Disp: 90 tablet, Rfl: 1  •  metFORMIN ER (GLUCOPHAGE-XR) 750 MG 24 hr tablet, Take 1 tablet by mouth 2 (Two) Times a Day., Disp: 180 tablet, Rfl: 1  •  Omega-3 Fatty Acids (FISH OIL) 1000 MG capsule capsule, Take 1,000 mg by mouth Daily With Breakfast. PT HOLDING FOR SURGERY, Disp: , Rfl:   •  triamterene-hydrochlorothiazide (DYAZIDE) 37.5-25 MG per capsule, Take 2 capsules by mouth Daily., Disp: 180 capsule, Rfl: 1  •  vitamin C (ASCORBIC ACID) 250 MG tablet, Take 250 mg by mouth Daily., Disp: , Rfl:   •  VITAMIN E PO, Take 1 capsule by mouth Daily. PT HOLDING FOR SURGERY, Disp: , Rfl:   Allergies   Allergen Reactions   • Ceclor [Cefaclor] Rash        Objective   Vitals:    02/06/18 1252   BP: 154/69   Pulse: 52   SpO2: 95%     Physical Exam   Constitutional: He is oriented to person, place, and time. He appears well-developed and well-nourished.   HENT:   Head: Normocephalic.   Eyes: Conjunctivae, EOM and lids are normal. Pupils are equal, round, and reactive to light.   Neck: Trachea normal and normal range of motion. Neck supple. No hepatojugular reflux and no JVD present. Carotid bruit is not present. No thyroid mass and no thyromegaly present.   Cardiovascular: Normal rate, regular rhythm, S1 normal, S2 normal, normal heart sounds and normal pulses.   No extrasystoles are present. PMI is not displaced.    Pulmonary/Chest: Effort normal. He has decreased breath sounds in the right lower field.   Abdominal:  Soft. Normal appearance and bowel sounds are normal. He exhibits no mass. There is no hepatosplenomegaly. There is no tenderness. No hernia.   Musculoskeletal: Normal range of motion.   Neurological: He is alert and oriented to person, place, and time. He has normal strength and normal reflexes. No cranial nerve deficit or sensory deficit. He displays a negative Romberg sign.   Skin: Skin is warm, dry and intact.   Psychiatric: He has a normal mood and affect. His speech is normal and behavior is normal. Judgment and thought content normal. Cognition and memory are normal.     Independent Review of Radiographic Studies:    CT scan of the chest performed January 30, 2018 was independently reviewed and compared to previous scans.  There are a few enlarged right paratracheal lymph nodes measuring up to 1.4 cm.  There is an enlarged subcarinal lymph node.  These nodes are unchanged.  There is a tiny right pleural effusion.  There is no left pleural effusion.  There is some chronic pleural based atelectasis and scarring in the posterior and inferior aspect of the right lower lobe    Assessment/Plan      Assessment: Right pleural effusion has resolved.  Chronic changes secondary to right VAT with decortication.  Overall the patient is made a good recovery from his surgery and has had a good result.      Plan: We'll see the patient back in the office on an as-needed basis only.  Thank you for allowing us to participate in the care of Mr. Maciel.    Diagnoses and all orders for this visit:    Pleural effusion, right

## 2018-03-30 ENCOUNTER — OFFICE VISIT (OUTPATIENT)
Dept: FAMILY MEDICINE CLINIC | Facility: CLINIC | Age: 73
End: 2018-03-30

## 2018-03-30 VITALS
DIASTOLIC BLOOD PRESSURE: 78 MMHG | WEIGHT: 315 LBS | BODY MASS INDEX: 41.75 KG/M2 | HEIGHT: 73 IN | OXYGEN SATURATION: 99 % | HEART RATE: 70 BPM | SYSTOLIC BLOOD PRESSURE: 158 MMHG

## 2018-03-30 DIAGNOSIS — D64.9 ANEMIA, UNSPECIFIED TYPE: Primary | ICD-10-CM

## 2018-03-30 DIAGNOSIS — Z23 ENCOUNTER FOR IMMUNIZATION: ICD-10-CM

## 2018-03-30 DIAGNOSIS — G47.33 OBSTRUCTIVE APNEA: ICD-10-CM

## 2018-03-30 DIAGNOSIS — E66.01 MORBID OBESITY (HCC): ICD-10-CM

## 2018-03-30 DIAGNOSIS — E08.00 DIABETES MELLITUS DUE TO UNDERLYING CONDITION WITH HYPEROSMOLARITY WITHOUT COMA, WITHOUT LONG-TERM CURRENT USE OF INSULIN (HCC): ICD-10-CM

## 2018-03-30 PROBLEM — J90 PLEURAL EFFUSION: Status: RESOLVED | Noted: 2017-05-08 | Resolved: 2018-03-30

## 2018-03-30 PROCEDURE — 99214 OFFICE O/P EST MOD 30 MIN: CPT | Performed by: FAMILY MEDICINE

## 2018-03-30 RX ORDER — ATORVASTATIN CALCIUM 40 MG/1
40 TABLET, FILM COATED ORAL NIGHTLY
Qty: 90 TABLET | Refills: 1 | Status: SHIPPED | OUTPATIENT
Start: 2018-03-30 | End: 2018-09-28 | Stop reason: SDUPTHER

## 2018-03-30 RX ORDER — CLOPIDOGREL BISULFATE 75 MG/1
75 TABLET ORAL DAILY
Qty: 90 TABLET | Refills: 1 | Status: SHIPPED | OUTPATIENT
Start: 2018-03-30 | End: 2018-09-28 | Stop reason: SDUPTHER

## 2018-03-30 RX ORDER — METFORMIN HYDROCHLORIDE 750 MG/1
750 TABLET, EXTENDED RELEASE ORAL 2 TIMES DAILY WITH MEALS
Qty: 180 TABLET | Refills: 1 | Status: SHIPPED | OUTPATIENT
Start: 2018-03-30 | End: 2018-09-28 | Stop reason: SDUPTHER

## 2018-03-30 RX ORDER — LISINOPRIL 2.5 MG/1
2.5 TABLET ORAL DAILY
Qty: 90 TABLET | Refills: 1 | Status: SHIPPED | OUTPATIENT
Start: 2018-03-30 | End: 2018-05-08 | Stop reason: SDUPTHER

## 2018-03-30 RX ORDER — ATENOLOL 50 MG/1
50 TABLET ORAL 2 TIMES DAILY
Qty: 180 TABLET | Refills: 1 | Status: SHIPPED | OUTPATIENT
Start: 2018-03-30 | End: 2018-05-08 | Stop reason: SDUPTHER

## 2018-03-30 RX ORDER — GLIPIZIDE 5 MG/1
TABLET ORAL
Qty: 270 TABLET | Refills: 1 | Status: SHIPPED | OUTPATIENT
Start: 2018-03-30 | End: 2018-09-28 | Stop reason: SDUPTHER

## 2018-03-30 RX ORDER — TRIAMTERENE AND HYDROCHLOROTHIAZIDE 37.5; 25 MG/1; MG/1
2 CAPSULE ORAL DAILY
Qty: 180 CAPSULE | Refills: 1 | Status: SHIPPED | OUTPATIENT
Start: 2018-03-30 | End: 2018-08-02 | Stop reason: HOSPADM

## 2018-03-30 NOTE — PROGRESS NOTES
Subjective   Rock Maciel is a 72 y.o. male. Previously seen by Dr. Oliveira. Pt is new to me, problems are new to me.      Chief Complaint   Patient presents with   • Establish Care     discuss weight loss   • Med Refill        History of Present Illness    Obesity and DM  BG checks in AM was around 98 and now in the 110s. Unsure why it has gone up a bit. He watches what he eats, but has gained a bit of weight over the winter. Why he is concerned about getting the weight down. He sees Max and Syed every 5-6 months. Has a spot on the left eye they are monitoring. Has had the PCV 7 but not 12 or 23. Sees podiatry routinely, has hx of ulcers but currently healed and doing well. Has to avoid lots of walking like he likes to do for exercise related to how easily he develops ulcers.     Hx of Pleural effusion 5/2017  Hx of pleural effusion requiring extensive hospitalization and procedure for preventing recurrence. Had VATs with decortication. Just cleared by pulmonology last month. Found to have hgb of 10.5 at this time but no further work up on anemia.    SHASTA  Has sleep apnea and wears his mask nightly. Sleeps well. Seen by sleep 8/2017 with good check up.     Aortic stenosis  Seen by cardiology, transitioning for Dr. Banegas to Dr. Toney Last echo showed severe aortic stenosis, LA mod to severely dilated, EF 68%, mild concentric LVH (5/2017). Had stress test 3/2016 that was unremarkable. Also hx of CAD and is on statin therapy with good control.    The following portions of the patient's history were reviewed and updated as appropriate: allergies, current medications, past medical history, past surgical history and problem list.    Review of Systems   Constitutional: Negative for activity change, appetite change and unexpected weight change.   Respiratory: Negative for shortness of breath.    Cardiovascular: Negative for chest pain and leg swelling.   Gastrointestinal: Negative for blood in stool,  "constipation and diarrhea.       Objective   Blood pressure 158/78, pulse 70, height 185.4 cm (72.99\"), weight (!) 144 kg (318 lb), SpO2 99 %.  Physical Exam   Constitutional: He is oriented to person, place, and time. He appears well-nourished. No distress.   HENT:   Mouth/Throat: Oropharynx is clear and moist.   Eyes: Conjunctivae are normal. Right eye exhibits no discharge. Left eye exhibits no discharge. No scleral icterus.   Neck: Neck supple. No thyromegaly present.   Cardiovascular: Normal rate, regular rhythm, normal heart sounds and intact distal pulses.  Exam reveals no gallop and no friction rub.    No murmur heard.  Pulmonary/Chest: Effort normal and breath sounds normal. No respiratory distress. He has no wheezes.   Musculoskeletal: He exhibits no edema.   Lymphadenopathy:     He has no cervical adenopathy.   Neurological: He is alert and oriented to person, place, and time.   Psychiatric: He has a normal mood and affect. His behavior is normal.   Vitals reviewed.      Assessment/Plan   Rock was seen today for establish care and med refill.    Diagnoses and all orders for this visit:    Anemia, unspecified type  -     CBC & Differential    Diabetes mellitus due to underlying condition with hyperosmolarity without coma, without long-term current use of insulin  -     Basic Metabolic Panel  -     Hemoglobin A1c  -     Ambulatory Referral to Diabetic Education  Last labs from 5/2017 showed A1c was 5.4%, if repeats as well could consider reducing his medication.  Morbid obesity  -     Ambulatory Referral to Diabetic Education  Pt interested and motivated for weight loss  Obstructive apnea  -     CBC & Differential  Controlled, using CPAP has regular visits with pulm.  Encounter for immunization  -     pneumococcal conj. 13-valent (PREVNAR-13) vaccine 0.5 mL; Inject 0.5 mL into the shoulder, thigh, or buttocks 1 (One) Time.    Other orders  -     atenolol (TENORMIN) 50 MG tablet; Take 1 tablet by mouth 2 " (Two) Times a Day.  -     atorvastatin (LIPITOR) 40 MG tablet; Take 1 tablet by mouth Every Night.  -     clopidogrel (PLAVIX) 75 MG tablet; Take 1 tablet by mouth Daily.  -     glipiZIDE (GLUCOTROL) 5 MG tablet; 2 in am and 1 at 5 pm  -     lisinopril (PRINIVIL,ZESTRIL) 2.5 MG tablet; Take 1 tablet by mouth Daily.  -     metFORMIN ER (GLUCOPHAGE-XR) 750 MG 24 hr tablet; Take 1 tablet by mouth 2 (Two) Times a Day With Meals.  -     triamterene-hydrochlorothiazide (DYAZIDE) 37.5-25 MG per capsule; Take 2 capsules by mouth Daily.  -     glucose blood (ACCU-CHEK BOB PLUS) test strip; Use as instructed

## 2018-03-31 LAB
BASOPHILS # BLD AUTO: 0.02 10*3/MM3 (ref 0–0.2)
BASOPHILS NFR BLD AUTO: 0.3 % (ref 0–1.5)
BUN SERPL-MCNC: 58 MG/DL (ref 8–23)
BUN/CREAT SERPL: 35.4 (ref 7–25)
CALCIUM SERPL-MCNC: 9.2 MG/DL (ref 8.6–10.5)
CHLORIDE SERPL-SCNC: 104 MMOL/L (ref 98–107)
CO2 SERPL-SCNC: 26.7 MMOL/L (ref 22–29)
CREAT SERPL-MCNC: 1.64 MG/DL (ref 0.76–1.27)
EOSINOPHIL # BLD AUTO: 0.21 10*3/MM3 (ref 0–0.7)
EOSINOPHIL NFR BLD AUTO: 3.1 % (ref 0.3–6.2)
ERYTHROCYTE [DISTWIDTH] IN BLOOD BY AUTOMATED COUNT: 14.5 % (ref 11.5–14.5)
GFR SERPLBLD CREATININE-BSD FMLA CKD-EPI: 41 ML/MIN/1.73
GFR SERPLBLD CREATININE-BSD FMLA CKD-EPI: 50 ML/MIN/1.73
GLUCOSE SERPL-MCNC: 138 MG/DL (ref 65–99)
HBA1C MFR BLD: 5.6 % (ref 4.8–5.6)
HCT VFR BLD AUTO: 38.3 % (ref 40.4–52.2)
HGB BLD-MCNC: 12 G/DL (ref 13.7–17.6)
IMM GRANULOCYTES # BLD: 0 10*3/MM3 (ref 0–0.03)
IMM GRANULOCYTES NFR BLD: 0 % (ref 0–0.5)
LYMPHOCYTES # BLD AUTO: 1.9 10*3/MM3 (ref 0.9–4.8)
LYMPHOCYTES NFR BLD AUTO: 28.4 % (ref 19.6–45.3)
MCH RBC QN AUTO: 28.8 PG (ref 27–32.7)
MCHC RBC AUTO-ENTMCNC: 31.3 G/DL (ref 32.6–36.4)
MCV RBC AUTO: 91.8 FL (ref 79.8–96.2)
MONOCYTES # BLD AUTO: 0.39 10*3/MM3 (ref 0.2–1.2)
MONOCYTES NFR BLD AUTO: 5.8 % (ref 5–12)
NEUTROPHILS # BLD AUTO: 4.17 10*3/MM3 (ref 1.9–8.1)
NEUTROPHILS NFR BLD AUTO: 62.4 % (ref 42.7–76)
PLATELET # BLD AUTO: 217 10*3/MM3 (ref 140–500)
POTASSIUM SERPL-SCNC: 5 MMOL/L (ref 3.5–5.2)
RBC # BLD AUTO: 4.17 10*6/MM3 (ref 4.6–6)
SODIUM SERPL-SCNC: 141 MMOL/L (ref 136–145)
WBC # BLD AUTO: 6.69 10*3/MM3 (ref 4.5–10.7)

## 2018-05-08 ENCOUNTER — OFFICE VISIT (OUTPATIENT)
Dept: CARDIOLOGY | Facility: CLINIC | Age: 73
End: 2018-05-08

## 2018-05-08 VITALS
HEART RATE: 50 BPM | HEIGHT: 73 IN | BODY MASS INDEX: 41.62 KG/M2 | DIASTOLIC BLOOD PRESSURE: 65 MMHG | SYSTOLIC BLOOD PRESSURE: 169 MMHG | WEIGHT: 314 LBS

## 2018-05-08 DIAGNOSIS — I77.9 BILATERAL CAROTID ARTERY DISEASE (HCC): ICD-10-CM

## 2018-05-08 DIAGNOSIS — E66.09 CLASS 1 OBESITY DUE TO EXCESS CALORIES WITHOUT SERIOUS COMORBIDITY WITH BODY MASS INDEX (BMI) OF 30.0 TO 30.9 IN ADULT: ICD-10-CM

## 2018-05-08 DIAGNOSIS — I35.0 NONRHEUMATIC AORTIC VALVE STENOSIS: ICD-10-CM

## 2018-05-08 DIAGNOSIS — E78.5 HYPERLIPIDEMIA, UNSPECIFIED HYPERLIPIDEMIA TYPE: ICD-10-CM

## 2018-05-08 DIAGNOSIS — R00.1 BRADYCARDIA: Primary | ICD-10-CM

## 2018-05-08 DIAGNOSIS — R06.02 SHORTNESS OF BREATH: ICD-10-CM

## 2018-05-08 DIAGNOSIS — I10 ESSENTIAL HYPERTENSION: ICD-10-CM

## 2018-05-08 PROCEDURE — 99213 OFFICE O/P EST LOW 20 MIN: CPT | Performed by: INTERNAL MEDICINE

## 2018-05-08 PROCEDURE — 93000 ELECTROCARDIOGRAM COMPLETE: CPT | Performed by: INTERNAL MEDICINE

## 2018-05-08 RX ORDER — LISINOPRIL 5 MG/1
5 TABLET ORAL DAILY
Qty: 90 TABLET | Refills: 3 | Status: SHIPPED | OUTPATIENT
Start: 2018-05-08 | End: 2018-08-02 | Stop reason: HOSPADM

## 2018-05-08 RX ORDER — ATENOLOL 50 MG/1
50 TABLET ORAL DAILY
Qty: 90 TABLET | Refills: 3 | Status: SHIPPED | OUTPATIENT
Start: 2018-05-08 | End: 2018-09-28 | Stop reason: SDUPTHER

## 2018-05-08 NOTE — PROGRESS NOTES
Subjective:       Rock Maciel is a 72 y.o. male who here for follow up    CC  bp running high    as  HPI  72-year-old male here for the follow-up because of the hypertension as well as aortic stenosis has been complaining that the blood pressure has been running moderately high intermittently, with known history of aortic stenosis for several years also complains of shortness of breath on exertion gradually worsening but no chest pains     Problem List Items Addressed This Visit        Cardiovascular and Mediastinum    Essential hypertension    Relevant Medications    lisinopril (PRINIVIL,ZESTRIL) 5 MG tablet    atenolol (TENORMIN) 50 MG tablet    Other Relevant Orders    Stress Test With Myocardial Perfusion One Day    Adult Transthoracic Echo Complete W/ Cont if Necessary Per Protocol    Comprehensive Metabolic Panel    Lipid Panel    Thyroid Panel With TSH    Nonrheumatic aortic valve stenosis    Relevant Medications    atenolol (TENORMIN) 50 MG tablet    Other Relevant Orders    Stress Test With Myocardial Perfusion One Day    Adult Transthoracic Echo Complete W/ Cont if Necessary Per Protocol    Comprehensive Metabolic Panel    Lipid Panel    Thyroid Panel With TSH    Ambulatory Referral to Cardiothoracic Surgery    Bradycardia - Primary    Relevant Orders    Stress Test With Myocardial Perfusion One Day    Adult Transthoracic Echo Complete W/ Cont if Necessary Per Protocol    Comprehensive Metabolic Panel    Lipid Panel    Thyroid Panel With TSH    Hyperlipidemia    Relevant Orders    Stress Test With Myocardial Perfusion One Day    Adult Transthoracic Echo Complete W/ Cont if Necessary Per Protocol    Comprehensive Metabolic Panel    Lipid Panel    Thyroid Panel With TSH    Bilateral carotid artery disease    Relevant Orders    Stress Test With Myocardial Perfusion One Day    Adult Transthoracic Echo Complete W/ Cont if Necessary Per Protocol    Comprehensive Metabolic Panel    Lipid Panel    Thyroid  "Panel With TSH       Digestive    Class 1 obesity due to excess calories without serious comorbidity with body mass index (BMI) of 30.0 to 30.9 in adult    Relevant Orders    ECG 12 Lead    Stress Test With Myocardial Perfusion One Day    Adult Transthoracic Echo Complete W/ Cont if Necessary Per Protocol    Comprehensive Metabolic Panel    Lipid Panel    Thyroid Panel With TSH      Other Visit Diagnoses     Shortness of breath         Relevant Orders    Stress Test With Myocardial Perfusion One Day        .    The following portions of the patient's history were reviewed and updated as appropriate: allergies, current medications, past family history, past medical history, past social history, past surgical history and problem list.    Past Medical History:   Diagnosis Date   • Allergic rhinitis    • Bronchitis    • Coronary artery disease    • Diabetes mellitus 2001   • DM (diabetes mellitus)    • Encounter for annual health examination 05/06/2014    Annual Health Assessment   • Gout    • Hiatal hernia    • History of MRSA infection     RIGHT FOOT 2010   • Hyperlipidemia    • Hypertension    • Murmur    • Pneumothorax on right    • Sleep apnea     pt wears CPAP at night   • Wellness examination 06/24/2015    Annual Wellness Visit    reports that he has never smoked. He has never used smokeless tobacco. He reports that he drinks alcohol. He reports that he does not use drugs.  Family History   Problem Relation Age of Onset   • Hypertension Father        Review of Systems  Constitutional: No wt loss, fever, fatigue  Gastrointestinal: No nausea, abdominal pain  Behavioral/Psych: No insomnia or anxiety   Cardiovascular Shortness of breath no chest pain  Objective:       Physical Exam             Physical Exam  /65 (BP Location: Left arm, Patient Position: Sitting)   Pulse 50   Ht 185.4 cm (73\")   Wt (!) 142 kg (314 lb)   BMI 41.43 kg/m²     General appearance: NAD, conversant   Eyes: anicteric sclerae, moist " conjunctivae; no lid-lag; PERRLA   HENT: Atraumatic; oropharynx clear with moist mucous membranes and no mucosal ulcerations;  normal hard and soft palate   Neck: Trachea midline; FROM, supple, no thyromegaly or lymphadenopathy   Lungs: CTA, with normal respiratory effort and no intercostal retractions   CV: S1-S2 regular, sys murmurs, no rub, no gallop   Abdomen: Soft, non-tender; no masses or HSM   Extremities: No peripheral edema or extremity lymphadenopathy  Skin: Normal temperature, turgor and texture; no rash, ulcers or subcutaneous nodules   Psych: Appropriate affect, alert and oriented to person, place and time           Cardiographics  @  ECG 12 Lead  Date/Time: 5/8/2018 9:49 AM  Performed by: ABRAHAM DURAND  Authorized by: BARAHAM DURAND   Comparison: compared with previous ECG   Similar to previous ECG  Rhythm: sinus bradycardia  Conduction: complete RBBB  ST Flattening: all  Clinical impression: abnormal ECG          Total Cholesterol 0 - 200 mg/dL 139    Triglycerides 0 - 150 mg/dL 122    HDL Cholesterol 40 - 60 mg/dL 49    LDL Cholesterol  0 - 100 mg/dL 66    VLDL Cholesterol 5 - 40 mg/dL 24.4    LDL/HDL Ratio  1.34        Echocardiogram:        Current Outpatient Prescriptions:   •  atenolol (TENORMIN) 50 MG tablet, Take 1 tablet by mouth 2 (Two) Times a Day., Disp: 180 tablet, Rfl: 1  •  atorvastatin (LIPITOR) 40 MG tablet, Take 1 tablet by mouth Every Night., Disp: 90 tablet, Rfl: 1  •  Cholecalciferol (VITAMIN D3 PO), Take 1 capsule by mouth Daily. PT HOLDING FOR SURGERY, Disp: , Rfl:   •  clopidogrel (PLAVIX) 75 MG tablet, Take 1 tablet by mouth Daily., Disp: 90 tablet, Rfl: 1  •  glipiZIDE (GLUCOTROL) 5 MG tablet, 2 in am and 1 at 5 pm, Disp: 270 tablet, Rfl: 1  •  glucose blood (ACCU-CHEK BOB PLUS) test strip, Use as instructed, Disp: 100 each, Rfl: 12  •  Lancets (ACCU-CHEK SOFT TOUCH) lancets, ACCU-CHEK SOFTCLIX LANCETS, Disp: 200 each, Rfl: 1  •  lisinopril (PRINIVIL,ZESTRIL)  2.5 MG tablet, Take 1 tablet by mouth Daily., Disp: 90 tablet, Rfl: 1  •  metFORMIN ER (GLUCOPHAGE-XR) 750 MG 24 hr tablet, Take 1 tablet by mouth 2 (Two) Times a Day With Meals., Disp: 180 tablet, Rfl: 1  •  Omega-3 Fatty Acids (FISH OIL) 1000 MG capsule capsule, Take 1,000 mg by mouth Daily With Breakfast. PT HOLDING FOR SURGERY, Disp: , Rfl:   •  triamterene-hydrochlorothiazide (DYAZIDE) 37.5-25 MG per capsule, Take 2 capsules by mouth Daily., Disp: 180 capsule, Rfl: 1  •  vitamin C (ASCORBIC ACID) 250 MG tablet, Take 250 mg by mouth Daily., Disp: , Rfl:   •  VITAMIN E PO, Take 1 capsule by mouth Daily. PT HOLDING FOR SURGERY, Disp: , Rfl:    Assessment:        Patient Active Problem List   Diagnosis   • Abnormal ECG   • Arteriosclerosis of coronary artery   • Cardiac murmur   • LAFB (left anterior fascicular block)   • Bundle branch block, right   • Neuropathic arthropathy   • Diabetes mellitus   • Obstructive apnea   • Gain of weight   • Gout   • Morbid obesity   • Chronic venous insufficiency   • Aortic stenosis, moderate   • Carotid stenosis, asymptomatic               Plan:            ICD-10-CM ICD-9-CM   1. Bradycardia R00.1 427.89   2. Class 1 obesity due to excess calories without serious comorbidity with body mass index (BMI) of 30.0 to 30.9 in adult E66.09 278.00    Z68.30 V85.30   3. Essential hypertension I10 401.9   4. Hyperlipidemia, unspecified hyperlipidemia type E78.5 272.4   5. Nonrheumatic aortic valve stenosis I35.0 424.1   6. Bilateral carotid artery disease I77.9 447.9     1. Bradycardia  Considering the patient's symptoms as well as clinical situation and  EKG findings, along with cardiac risk factors, ischemic workup is necessary to rule out ischemic cardiomyopathy, stress induced arrhythmias, and functional capacity for diagnosis as well as prognostic consideration    - Stress Test With Myocardial Perfusion One Day  - Adult Transthoracic Echo Complete W/ Cont if Necessary Per Protocol  -  Comprehensive Metabolic Panel  - Lipid Panel  - Thyroid Panel With TSH    2. Class 1 obesity due to excess calories without serious comorbidity with body mass index (BMI) of 30.0 to 30.9 in adult  Significant risk of obesity to CAD, HTN has been explained  Advantages of wt reduction has been explained    - ECG 12 Lead  - Stress Test With Myocardial Perfusion One Day  - Adult Transthoracic Echo Complete W/ Cont if Necessary Per Protocol  - Comprehensive Metabolic Panel  - Lipid Panel  - Thyroid Panel With TSH    3. Essential hypertension  Blood pressure  - Stress Test With Myocardial Perfusion One Day  - Adult Transthoracic Echo Complete W/ Cont if Necessary Per Protocol  - Comprehensive Metabolic Panel  - Lipid Panel  - Thyroid Panel With TSH    4. Hyperlipidemia, unspecified hyperlipidemia type  Risk of the hyperlipidemia, importance of the treatment has been explained    Pros and cons of the statins has been explained    Regular blood workup as well as side effects including the liver failure, myelopathy death has been explained        - Stress Test With Myocardial Perfusion One Day  - Adult Transthoracic Echo Complete W/ Cont if Necessary Per Protocol  - Comprehensive Metabolic Panel  - Lipid Panel  - Thyroid Panel With TSH    5. Nonrheumatic aortic valve stenosis  We'll check the echocardiogram  - Stress Test With Myocardial Perfusion One Day  - Adult Transthoracic Echo Complete W/ Cont if Necessary Per Protocol  - Comprehensive Metabolic Panel  - Lipid Panel  - Thyroid Panel With TSH  - Ambulatory Referral to Cardiothoracic Surgery    6. Bilateral carotid artery disease  Asymptomatic  - Stress Test With Myocardial Perfusion One Day  - Adult Transthoracic Echo Complete W/ Cont if Necessary Per Protocol  - Comprehensive Metabolic Panel  - Lipid Panel  - Thyroid Panel With TSH    7. Shortness of breath   Considering patient's medical condition as well as the risk factors, patient will require echocardiogram for  further evaluation for the LV function, four-chamber evaluation, including the pressures, valvular function and  pericardial disease and pericardial effusion    - Stress Test With Myocardial Perfusion One Day  echo, lexiscan, flp/ cmp    See us 2-4 wks  COUNSELING:    Rock Key was given to patient for the following topics: diagnostic results, risk factor reductions, impressions, risks and benefits of treatment options and importance of treatment compliance .       SMOKING COUNSELING:    Counseling given: Not Answered      EMR Dragon/Transcription disclaimer:   Much of this encounter note is an electronic transcription/translation of spoken language to printed text. The electronic translation of spoken language may permit erroneous, or at times, nonsensical words or phrases to be inadvertently transcribed; Although I have reviewed the note for such errors, some may still exist.

## 2018-05-25 ENCOUNTER — HOSPITAL ENCOUNTER (OUTPATIENT)
Dept: CARDIOLOGY | Facility: HOSPITAL | Age: 73
Discharge: HOME | End: 2018-05-25
Attending: INTERNAL MEDICINE | Admitting: INTERNAL MEDICINE

## 2018-05-25 ENCOUNTER — HOSPITAL ENCOUNTER (OUTPATIENT)
Dept: CARDIOLOGY | Facility: HOSPITAL | Age: 73
Discharge: HOME | End: 2018-05-25

## 2018-05-25 VITALS
DIASTOLIC BLOOD PRESSURE: 77 MMHG | SYSTOLIC BLOOD PRESSURE: 163 MMHG | WEIGHT: 314 LBS | HEIGHT: 73 IN | BODY MASS INDEX: 41.62 KG/M2 | HEART RATE: 55 BPM

## 2018-05-25 LAB
ALBUMIN SERPL-MCNC: 4.1 G/DL (ref 3.5–5.2)
ALBUMIN/GLOB SERPL: 1.3 G/DL
ALP SERPL-CCNC: 79 U/L (ref 39–117)
ALT SERPL W P-5'-P-CCNC: 12 U/L (ref 1–41)
ANION GAP SERPL CALCULATED.3IONS-SCNC: 10.2 MMOL/L
AST SERPL-CCNC: 12 U/L (ref 1–40)
BH CV ECHO MEAS - ACS: 1.3 CM
BH CV ECHO MEAS - AI DEC SLOPE: 110.6 CM/SEC^2
BH CV ECHO MEAS - AI MAX PG: 49.8 MMHG
BH CV ECHO MEAS - AI MAX VEL: 353 CM/SEC
BH CV ECHO MEAS - AI P1/2T: 934.5 MSEC
BH CV ECHO MEAS - AO MAX PG (FULL): 56.5 MMHG
BH CV ECHO MEAS - AO MAX PG: 62.1 MMHG
BH CV ECHO MEAS - AO MEAN PG (FULL): 36 MMHG
BH CV ECHO MEAS - AO MEAN PG: 39 MMHG
BH CV ECHO MEAS - AO ROOT AREA (BSA CORRECTED): 1.4
BH CV ECHO MEAS - AO ROOT AREA: 10.8 CM^2
BH CV ECHO MEAS - AO ROOT DIAM: 3.7 CM
BH CV ECHO MEAS - AO V2 MAX: 394 CM/SEC
BH CV ECHO MEAS - AO V2 MEAN: 298 CM/SEC
BH CV ECHO MEAS - AO V2 VTI: 126 CM
BH CV ECHO MEAS - AVA(I,A): 1.3 CM^2
BH CV ECHO MEAS - AVA(I,D): 1.3 CM^2
BH CV ECHO MEAS - AVA(V,A): 1.5 CM^2
BH CV ECHO MEAS - AVA(V,D): 1.5 CM^2
BH CV ECHO MEAS - BSA(HAYCOCK): 2.8 M^2
BH CV ECHO MEAS - BSA: 2.6 M^2
BH CV ECHO MEAS - BZI_BMI: 41.4 KILOGRAMS/M^2
BH CV ECHO MEAS - BZI_METRIC_HEIGHT: 185.4 CM
BH CV ECHO MEAS - BZI_METRIC_WEIGHT: 142.4 KG
BH CV ECHO MEAS - CONTRAST EF (2CH): 65.3 ML/M^2
BH CV ECHO MEAS - CONTRAST EF 4CH: 67.9 ML/M^2
BH CV ECHO MEAS - EDV(CUBED): 140.6 ML
BH CV ECHO MEAS - EDV(MOD-SP2): 124 ML
BH CV ECHO MEAS - EDV(MOD-SP4): 140 ML
BH CV ECHO MEAS - EDV(TEICH): 129.5 ML
BH CV ECHO MEAS - EF(CUBED): 66.8 %
BH CV ECHO MEAS - EF(MOD-BP): 68 %
BH CV ECHO MEAS - EF(MOD-SP2): 65.3 %
BH CV ECHO MEAS - EF(MOD-SP4): 67.9 %
BH CV ECHO MEAS - EF(TEICH): 58 %
BH CV ECHO MEAS - ESV(CUBED): 46.7 ML
BH CV ECHO MEAS - ESV(MOD-SP2): 43 ML
BH CV ECHO MEAS - ESV(MOD-SP4): 45 ML
BH CV ECHO MEAS - ESV(TEICH): 54.4 ML
BH CV ECHO MEAS - FS: 30.8 %
BH CV ECHO MEAS - IVS/LVPW: 1.1
BH CV ECHO MEAS - IVSD: 1.5 CM
BH CV ECHO MEAS - LA DIMENSION: 5.5 CM
BH CV ECHO MEAS - LA/AO: 1.5
BH CV ECHO MEAS - LAT PEAK E' VEL: 9 CM/SEC
BH CV ECHO MEAS - LV DIASTOLIC VOL/BSA (35-75): 53.7 ML/M^2
BH CV ECHO MEAS - LV MASS(C)D: 325.8 GRAMS
BH CV ECHO MEAS - LV MASS(C)DI: 125 GRAMS/M^2
BH CV ECHO MEAS - LV MAX PG: 5.6 MMHG
BH CV ECHO MEAS - LV MEAN PG: 3 MMHG
BH CV ECHO MEAS - LV SYSTOLIC VOL/BSA (12-30): 17.3 ML/M^2
BH CV ECHO MEAS - LV V1 MAX: 118 CM/SEC
BH CV ECHO MEAS - LV V1 MEAN: 83.3 CM/SEC
BH CV ECHO MEAS - LV V1 VTI: 33.3 CM
BH CV ECHO MEAS - LVIDD: 5.2 CM
BH CV ECHO MEAS - LVIDS: 3.6 CM
BH CV ECHO MEAS - LVLD AP2: 9 CM
BH CV ECHO MEAS - LVLD AP4: 8.7 CM
BH CV ECHO MEAS - LVLS AP2: 7.1 CM
BH CV ECHO MEAS - LVLS AP4: 6.5 CM
BH CV ECHO MEAS - LVOT AREA (M): 4.9 CM^2
BH CV ECHO MEAS - LVOT AREA: 4.9 CM^2
BH CV ECHO MEAS - LVOT DIAM: 2.5 CM
BH CV ECHO MEAS - LVPWD: 1.4 CM
BH CV ECHO MEAS - MED PEAK E' VEL: 5.7 CM/SEC
BH CV ECHO MEAS - MR MAX PG: 56.3 MMHG
BH CV ECHO MEAS - MR MAX VEL: 375 CM/SEC
BH CV ECHO MEAS - MV A DUR: 0.19 SEC
BH CV ECHO MEAS - MV A MAX VEL: 82 CM/SEC
BH CV ECHO MEAS - MV DEC SLOPE: 404 CM/SEC^2
BH CV ECHO MEAS - MV DEC TIME: 0.17 SEC
BH CV ECHO MEAS - MV E MAX VEL: 113 CM/SEC
BH CV ECHO MEAS - MV E/A: 1.4
BH CV ECHO MEAS - MV MAX PG: 6.6 MMHG
BH CV ECHO MEAS - MV MEAN PG: 2 MMHG
BH CV ECHO MEAS - MV P1/2T MAX VEL: 126 CM/SEC
BH CV ECHO MEAS - MV P1/2T: 91.3 MSEC
BH CV ECHO MEAS - MV V2 MAX: 128 CM/SEC
BH CV ECHO MEAS - MV V2 MEAN: 63.9 CM/SEC
BH CV ECHO MEAS - MV V2 VTI: 44.6 CM
BH CV ECHO MEAS - MVA P1/2T LCG: 1.7 CM^2
BH CV ECHO MEAS - MVA(P1/2T): 2.4 CM^2
BH CV ECHO MEAS - MVA(VTI): 3.7 CM^2
BH CV ECHO MEAS - PA ACC TIME: 0.11 SEC
BH CV ECHO MEAS - PA MAX PG (FULL): 1.7 MMHG
BH CV ECHO MEAS - PA MAX PG: 4.2 MMHG
BH CV ECHO MEAS - PA PR(ACCEL): 27.7 MMHG
BH CV ECHO MEAS - PA V2 MAX: 103 CM/SEC
BH CV ECHO MEAS - PULM A REVS DUR: 0.16 SEC
BH CV ECHO MEAS - PULM A REVS VEL: 19.9 CM/SEC
BH CV ECHO MEAS - PULM DIAS VEL: 61.1 CM/SEC
BH CV ECHO MEAS - PULM S/D: 0.78
BH CV ECHO MEAS - PULM SYS VEL: 47.5 CM/SEC
BH CV ECHO MEAS - PVA(V,A): 4.7 CM^2
BH CV ECHO MEAS - PVA(V,D): 4.7 CM^2
BH CV ECHO MEAS - QP/QS: 0.86
BH CV ECHO MEAS - RV MAX PG: 2.5 MMHG
BH CV ECHO MEAS - RV MEAN PG: 2 MMHG
BH CV ECHO MEAS - RV V1 MAX: 79.1 CM/SEC
BH CV ECHO MEAS - RV V1 MEAN: 58 CM/SEC
BH CV ECHO MEAS - RV V1 VTI: 22.8 CM
BH CV ECHO MEAS - RVDD: 3.5 CM
BH CV ECHO MEAS - RVOT AREA: 6.2 CM^2
BH CV ECHO MEAS - RVOT DIAM: 2.8 CM
BH CV ECHO MEAS - SI(AO): 519.8 ML/M^2
BH CV ECHO MEAS - SI(CUBED): 36 ML/M^2
BH CV ECHO MEAS - SI(LVOT): 62.7 ML/M^2
BH CV ECHO MEAS - SI(MOD-SP2): 31.1 ML/M^2
BH CV ECHO MEAS - SI(MOD-SP4): 36.4 ML/M^2
BH CV ECHO MEAS - SI(TEICH): 28.8 ML/M^2
BH CV ECHO MEAS - SV(AO): 1355 ML
BH CV ECHO MEAS - SV(CUBED): 94 ML
BH CV ECHO MEAS - SV(LVOT): 163.5 ML
BH CV ECHO MEAS - SV(MOD-SP2): 81 ML
BH CV ECHO MEAS - SV(MOD-SP4): 95 ML
BH CV ECHO MEAS - SV(RVOT): 140.4 ML
BH CV ECHO MEAS - SV(TEICH): 75.1 ML
BH CV ECHO MEAS - TAPSE (>1.6): 1.8 CM2
BH CV ECHO MEASUREMENTS AVERAGE E/E' RATIO: 15.37
BH CV XLRA - RV BASE: 4 CM
BH CV XLRA - RV LENGTH: 8.9 CM
BH CV XLRA - RV MID: 3 CM
BH CV XLRA - TDI S': 11.3 CM/SEC
BILIRUB SERPL-MCNC: 0.5 MG/DL (ref 0.1–1.2)
BUN BLD-MCNC: 46 MG/DL (ref 8–23)
BUN/CREAT SERPL: 28.4 (ref 7–25)
CALCIUM SPEC-SCNC: 9.2 MG/DL (ref 8.6–10.5)
CHLORIDE SERPL-SCNC: 100 MMOL/L (ref 98–107)
CHOLEST SERPL-MCNC: 120 MG/DL (ref 0–200)
CO2 SERPL-SCNC: 27.8 MMOL/L (ref 22–29)
CREAT BLD-MCNC: 1.62 MG/DL (ref 0.76–1.27)
GFR SERPL CREATININE-BSD FRML MDRD: 42 ML/MIN/1.73
GLOBULIN UR ELPH-MCNC: 3.1 GM/DL
GLUCOSE BLD-MCNC: 98 MG/DL (ref 65–99)
HDLC SERPL-MCNC: 39 MG/DL (ref 40–60)
LDLC SERPL CALC-MCNC: 63 MG/DL (ref 0–100)
LDLC/HDLC SERPL: 1.61 {RATIO}
LEFT ATRIUM VOLUME INDEX: 38 ML/M2
MAXIMAL PREDICTED HEART RATE: 147 BPM
POTASSIUM BLD-SCNC: 4.6 MMOL/L (ref 3.5–5.2)
PROT SERPL-MCNC: 7.2 G/DL (ref 6–8.5)
SODIUM BLD-SCNC: 138 MMOL/L (ref 136–145)
STRESS TARGET HR: 125 BPM
T-UPTAKE NFR SERPL: 0.98 TBI (ref 0.8–1.3)
T4 SERPL-MCNC: 6.95 MCG/DL (ref 4.5–11.7)
TRIGL SERPL-MCNC: 91 MG/DL (ref 0–150)
TSH SERPL DL<=0.05 MIU/L-ACNC: 3.27 MIU/ML (ref 0.27–4.2)
VLDLC SERPL-MCNC: 18.2 MG/DL (ref 5–40)

## 2018-05-25 PROCEDURE — 84436 ASSAY OF TOTAL THYROXINE: CPT | Performed by: INTERNAL MEDICINE

## 2018-05-25 PROCEDURE — 84443 ASSAY THYROID STIM HORMONE: CPT | Performed by: INTERNAL MEDICINE

## 2018-05-25 PROCEDURE — 80061 LIPID PANEL: CPT | Performed by: INTERNAL MEDICINE

## 2018-05-25 PROCEDURE — 36415 COLL VENOUS BLD VENIPUNCTURE: CPT | Performed by: INTERNAL MEDICINE

## 2018-05-25 PROCEDURE — 84479 ASSAY OF THYROID (T3 OR T4): CPT | Performed by: INTERNAL MEDICINE

## 2018-05-25 PROCEDURE — 80053 COMPREHEN METABOLIC PANEL: CPT | Performed by: INTERNAL MEDICINE

## 2018-05-25 PROCEDURE — 93306 TTE W/DOPPLER COMPLETE: CPT | Performed by: INTERNAL MEDICINE

## 2018-05-25 PROCEDURE — 93306 TTE W/DOPPLER COMPLETE: CPT

## 2018-06-07 ENCOUNTER — HOSPITAL ENCOUNTER (OUTPATIENT)
Dept: CARDIOLOGY | Facility: HOSPITAL | Age: 73
Discharge: HOME | End: 2018-06-07
Attending: INTERNAL MEDICINE

## 2018-06-07 VITALS
SYSTOLIC BLOOD PRESSURE: 172 MMHG | RESPIRATION RATE: 22 BRPM | HEART RATE: 47 BPM | HEIGHT: 73 IN | OXYGEN SATURATION: 99 % | DIASTOLIC BLOOD PRESSURE: 71 MMHG | WEIGHT: 300 LBS | BODY MASS INDEX: 39.76 KG/M2

## 2018-06-07 PROCEDURE — 93016 CV STRESS TEST SUPVJ ONLY: CPT | Performed by: INTERNAL MEDICINE

## 2018-06-07 PROCEDURE — 25010000002 REGADENOSON 0.4 MG/5ML SOLUTION: Performed by: INTERNAL MEDICINE

## 2018-06-07 PROCEDURE — 93017 CV STRESS TEST TRACING ONLY: CPT

## 2018-06-07 PROCEDURE — 0 TECHNETIUM SESTAMIBI: Performed by: INTERNAL MEDICINE

## 2018-06-07 PROCEDURE — 93018 CV STRESS TEST I&R ONLY: CPT | Performed by: INTERNAL MEDICINE

## 2018-06-07 PROCEDURE — 78452 HT MUSCLE IMAGE SPECT MULT: CPT | Performed by: INTERNAL MEDICINE

## 2018-06-07 PROCEDURE — A9500 TC99M SESTAMIBI: HCPCS | Performed by: INTERNAL MEDICINE

## 2018-06-07 PROCEDURE — 78452 HT MUSCLE IMAGE SPECT MULT: CPT

## 2018-06-07 RX ADMIN — TECHNETIUM TC 99M SESTAMIBI 1 DOSE: 1 INJECTION INTRAVENOUS at 11:26

## 2018-06-07 RX ADMIN — REGADENOSON 0.4 MG: 0.08 INJECTION, SOLUTION INTRAVENOUS at 11:26

## 2018-06-07 RX ADMIN — TECHNETIUM TC 99M SESTAMIBI 1 DOSE: 1 INJECTION INTRAVENOUS at 08:36

## 2018-06-11 ENCOUNTER — OFFICE VISIT (OUTPATIENT)
Dept: CARDIOLOGY | Facility: CLINIC | Age: 73
End: 2018-06-11

## 2018-06-11 VITALS
HEART RATE: 55 BPM | HEIGHT: 73 IN | DIASTOLIC BLOOD PRESSURE: 67 MMHG | WEIGHT: 315 LBS | SYSTOLIC BLOOD PRESSURE: 157 MMHG | BODY MASS INDEX: 41.75 KG/M2

## 2018-06-11 DIAGNOSIS — E78.5 HYPERLIPIDEMIA, UNSPECIFIED HYPERLIPIDEMIA TYPE: ICD-10-CM

## 2018-06-11 DIAGNOSIS — I25.10 ARTERIOSCLEROSIS OF CORONARY ARTERY: Primary | ICD-10-CM

## 2018-06-11 DIAGNOSIS — E66.09 CLASS 1 OBESITY DUE TO EXCESS CALORIES WITHOUT SERIOUS COMORBIDITY WITH BODY MASS INDEX (BMI) OF 30.0 TO 30.9 IN ADULT: ICD-10-CM

## 2018-06-11 DIAGNOSIS — R94.39 ABNORMAL CARDIOVASCULAR STRESS TEST: ICD-10-CM

## 2018-06-11 DIAGNOSIS — I10 ESSENTIAL HYPERTENSION: ICD-10-CM

## 2018-06-11 LAB
BH CV STRESS BP STAGE 1: NORMAL
BH CV STRESS COMMENTS STAGE 1: NORMAL
BH CV STRESS DOSE REGADENOSON STAGE 1: 0.4
BH CV STRESS DURATION MIN STAGE 1: 1
BH CV STRESS DURATION SEC STAGE 1: 0
BH CV STRESS HR STAGE 1: 68
BH CV STRESS O2 STAGE 1: 100
BH CV STRESS PROTOCOL 1: NORMAL
BH CV STRESS RECOVERY BP: NORMAL MMHG
BH CV STRESS RECOVERY HR: 63 BPM
BH CV STRESS RECOVERY O2: 99 %
BH CV STRESS STAGE 1: 1
LV EF NUC BP: 50 %
MAXIMAL PREDICTED HEART RATE: 147 BPM
PERCENT MAX PREDICTED HR: 46.26 %
STRESS BASELINE BP: NORMAL MMHG
STRESS BASELINE HR: 46 BPM
STRESS O2 SAT REST: 99 %
STRESS PERCENT HR: 54 %
STRESS POST ESTIMATED WORKLOAD: 1 METS
STRESS POST EXERCISE DUR MIN: 1 MIN
STRESS POST EXERCISE DUR SEC: 0 SEC
STRESS POST O2 SAT PEAK: 100 %
STRESS POST PEAK BP: NORMAL MMHG
STRESS POST PEAK HR: 68 BPM
STRESS TARGET HR: 125 BPM

## 2018-06-11 PROCEDURE — 99214 OFFICE O/P EST MOD 30 MIN: CPT | Performed by: INTERNAL MEDICINE

## 2018-06-11 RX ORDER — NITROGLYCERIN 0.4 MG/1
TABLET SUBLINGUAL
Qty: 100 TABLET | Refills: 11 | Status: SHIPPED | OUTPATIENT
Start: 2018-06-11 | End: 2018-08-02 | Stop reason: HOSPADM

## 2018-06-11 NOTE — PROGRESS NOTES
Subjective:       Rock Maciel Jr. is a 72 y.o. male who here for follow up    CC  Follow up after stress test and echo  HPI  72-year-old male with known history of the coronary artery disease, benign essential arterial hypertension hyperlipidemia and obesity here for the follow-up after the stress test as well as echocardiogram which is strongly positive    Patient denies any chest pains or tightness chest no heaviness with a pressure sensation     Problem List Items Addressed This Visit        Cardiovascular and Mediastinum    Arteriosclerosis of coronary artery - Primary    Relevant Medications    nitroglycerin (NITROSTAT) 0.4 MG SL tablet    Essential hypertension    Hyperlipidemia       Digestive    Class 1 obesity due to excess calories without serious comorbidity with body mass index (BMI) of 30.0 to 30.9 in adult        .    The following portions of the patient's history were reviewed and updated as appropriate: allergies, current medications, past family history, past medical history, past social history, past surgical history and problem list.    Past Medical History:   Diagnosis Date   • Allergic rhinitis    • Bronchitis    • Coronary artery disease    • Diabetes mellitus 2001   • DM (diabetes mellitus)    • Encounter for annual health examination 05/06/2014    Annual Health Assessment   • Gout    • Hiatal hernia    • History of MRSA infection     RIGHT FOOT 2010   • Hyperlipidemia    • Hypertension    • Murmur    • Pneumothorax on right    • Sleep apnea     pt wears CPAP at night   • Wellness examination 06/24/2015    Annual Wellness Visit    reports that he has never smoked. He has never used smokeless tobacco. He reports that he drinks alcohol. He reports that he does not use drugs.  Family History   Problem Relation Age of Onset   • Hypertension Father        Review of Systems  Constitutional: No wt loss, fever, fatigue  Gastrointestinal: No nausea, abdominal pain  Behavioral/Psych: No insomnia  "or anxiety   Cardiovascular   Objective:                 Physical Exam  /67   Pulse 55   Ht 185.4 cm (73\")   Wt (!) 143 kg (316 lb)   BMI 41.69 kg/m²     General appearance: NAD, conversant   Eyes: anicteric sclerae, moist conjunctivae; no lid-lag; PERRLA   HENT: Atraumatic; oropharynx clear with moist mucous membranes and no mucosal ulcerations;  normal hard and soft palate   Neck: Trachea midline; FROM, supple, no thyromegaly or lymphadenopathy   Lungs: CTA, with normal respiratory effort and no intercostal retractions   CV: S1-S2 regular, no murmurs, no rub, no gallop   Abdomen: Soft, non-tender; no masses or HSM   Extremities: No peripheral edema or extremity lymphadenopathy  Skin: Normal temperature, turgor and texture; no rash, ulcers or subcutaneous nodules   Psych: Appropriate affect, alert and oriented to person, place and time           Cardiographics  @Procedures    Echocardiogram:        Current Outpatient Prescriptions:   •  atenolol (TENORMIN) 50 MG tablet, Take 1 tablet by mouth Daily., Disp: 90 tablet, Rfl: 3  •  atorvastatin (LIPITOR) 40 MG tablet, Take 1 tablet by mouth Every Night., Disp: 90 tablet, Rfl: 1  •  Cholecalciferol (VITAMIN D3 PO), Take 1 capsule by mouth Daily. PT HOLDING FOR SURGERY, Disp: , Rfl:   •  clopidogrel (PLAVIX) 75 MG tablet, Take 1 tablet by mouth Daily., Disp: 90 tablet, Rfl: 1  •  glipiZIDE (GLUCOTROL) 5 MG tablet, 2 in am and 1 at 5 pm, Disp: 270 tablet, Rfl: 1  •  glucose blood (ACCU-CHEK BOB PLUS) test strip, Use as instructed, Disp: 100 each, Rfl: 12  •  Lancets (ACCU-CHEK SOFT TOUCH) lancets, ACCU-CHEK SOFTCLIX LANCETS, Disp: 200 each, Rfl: 1  •  lisinopril (PRINIVIL,ZESTRIL) 5 MG tablet, Take 1 tablet by mouth Daily., Disp: 90 tablet, Rfl: 3  •  metFORMIN ER (GLUCOPHAGE-XR) 750 MG 24 hr tablet, Take 1 tablet by mouth 2 (Two) Times a Day With Meals., Disp: 180 tablet, Rfl: 1  •  Omega-3 Fatty Acids (FISH OIL) 1000 MG capsule capsule, Take 1,000 mg by " mouth Daily With Breakfast. PT HOLDING FOR SURGERY, Disp: , Rfl:   •  triamterene-hydrochlorothiazide (DYAZIDE) 37.5-25 MG per capsule, Take 2 capsules by mouth Daily., Disp: 180 capsule, Rfl: 1  •  vitamin C (ASCORBIC ACID) 250 MG tablet, Take 250 mg by mouth Daily., Disp: , Rfl:   •  VITAMIN E PO, Take 1 capsule by mouth Daily. PT HOLDING FOR SURGERY, Disp: , Rfl:    Assessment:        Patient Active Problem List   Diagnosis   • Abnormal ECG   • Arteriosclerosis of coronary artery   • Cardiac murmur   • Essential hypertension   • LAFB (left anterior fascicular block)   • Bundle branch block, right   • Neuropathic arthropathy   • Diabetes mellitus   • Obstructive apnea   • Gain of weight   • Gout   • Class 1 obesity due to excess calories without serious comorbidity with body mass index (BMI) of 30.0 to 30.9 in adult   • Chronic venous insufficiency   • Nonrheumatic aortic valve stenosis   • Carotid stenosis, asymptomatic   • Bradycardia   • Hyperlipidemia   • Bilateral carotid artery disease     Interpretation Summary        · Findings consistent with a normal ECG stress test.  · Diaphragmatic attenuation artifact is present.  · Left ventricular ejection fraction is normal (Calculated EF = 50%).  · Myocardial perfusion imaging indicates a small-to-medium-sized infarct located in the inferior wall and lateral wall with no significant ischemia noted.  · Myocardial perfusion imaging indicates a small-to-medium-sized infarct located in the anterior wall and lateral wall with mild-to-moderate rodriguez-infarct ischemia.  · Impressions are consistent with a high risk study.         Interpretation Summary     · Left atrial cavity size is moderately dilated.  · Mild mitral valve regurgitation is present  · Mild tricuspid valve regurgitation is present.  · Calculated EF = 68%.  · There is no evidence of pericardial effusion.           Plan:            ICD-10-CM ICD-9-CM   1. Arteriosclerosis of coronary artery I25.10 414.00    2. Essential hypertension I10 401.9   3. Hyperlipidemia, unspecified hyperlipidemia type E78.5 272.4   4. Class 1 obesity due to excess calories without serious comorbidity with body mass index (BMI) of 30.0 to 30.9 in adult E66.09 278.00    Z68.30 V85.30     1. Arteriosclerosis of coronary artery  Distress is a strongly positive patient should have a diagnostic heart catheter, patient will think it over    2. Essential hypertension  The blood pressures are under control    3. Hyperlipidemia, unspecified hyperlipidemia type  Counseling has been done    4. Class 1 obesity due to excess calories without serious comorbidity with body mass index (BMI) of 30.0 to 30.9 in adult  Counseling has been done     Needs cath    Pt will think it over    Prescriptions of the nitroglycerin and associated instructions has been given  Patient has been advised to use sublingual nitroglycerin for chest pain or equivalent symptoms, maximum of 3 with the 10 minutes interval.  If the symptoms persist patient has been advised to go to emergency room  Due to the risk of the hypotension patient has been advised to take the sublingual nitroglycerin while the patient in sitting position      See us 3 months  COUNSELING:    Rock Maciel Jr.Counseling was given to patient for the following topics: diagnostic results, risk factor reductions, impressions, risks and benefits of treatment options and importance of treatment compliance .       SMOKING COUNSELING:    Counseling given: Not Answered      EMR Dragon/Transcription disclaimer:   Much of this encounter note is an electronic transcription/translation of spoken language to printed text. The electronic translation of spoken language may permit erroneous, or at times, nonsensical words or phrases to be inadvertently transcribed; Although I have reviewed the note for such errors, some may still exist.

## 2018-06-26 ENCOUNTER — HOSPITAL ENCOUNTER (OUTPATIENT)
Facility: HOSPITAL | Age: 73
Setting detail: HOSPITAL OUTPATIENT SURGERY
End: 2018-06-26
Attending: INTERNAL MEDICINE | Admitting: INTERNAL MEDICINE

## 2018-06-26 DIAGNOSIS — R94.39 ABNORMAL CARDIOVASCULAR STRESS TEST: ICD-10-CM

## 2018-06-26 DIAGNOSIS — E78.5 HYPERLIPIDEMIA, UNSPECIFIED HYPERLIPIDEMIA TYPE: ICD-10-CM

## 2018-06-26 DIAGNOSIS — E66.09 CLASS 1 OBESITY DUE TO EXCESS CALORIES WITHOUT SERIOUS COMORBIDITY WITH BODY MASS INDEX (BMI) OF 30.0 TO 30.9 IN ADULT: ICD-10-CM

## 2018-06-26 DIAGNOSIS — I25.10 ARTERIOSCLEROSIS OF CORONARY ARTERY: ICD-10-CM

## 2018-06-26 DIAGNOSIS — I10 ESSENTIAL HYPERTENSION: ICD-10-CM

## 2018-07-16 ENCOUNTER — APPOINTMENT (OUTPATIENT)
Dept: CARDIOLOGY | Facility: HOSPITAL | Age: 73
End: 2018-07-16

## 2018-07-18 ENCOUNTER — HOSPITAL ENCOUNTER (OUTPATIENT)
Dept: CARDIOLOGY | Facility: HOSPITAL | Age: 73
Discharge: HOME | End: 2018-07-18
Admitting: INTERNAL MEDICINE

## 2018-07-18 DIAGNOSIS — E78.5 HYPERLIPIDEMIA, UNSPECIFIED HYPERLIPIDEMIA TYPE: ICD-10-CM

## 2018-07-18 DIAGNOSIS — R94.39 ABNORMAL CARDIOVASCULAR STRESS TEST: ICD-10-CM

## 2018-07-18 DIAGNOSIS — I25.10 ARTERIOSCLEROSIS OF CORONARY ARTERY: ICD-10-CM

## 2018-07-18 DIAGNOSIS — E66.09 CLASS 1 OBESITY DUE TO EXCESS CALORIES WITHOUT SERIOUS COMORBIDITY WITH BODY MASS INDEX (BMI) OF 30.0 TO 30.9 IN ADULT: ICD-10-CM

## 2018-07-18 DIAGNOSIS — I10 ESSENTIAL HYPERTENSION: ICD-10-CM

## 2018-07-18 LAB
ANION GAP SERPL CALCULATED.3IONS-SCNC: 15 MMOL/L
APTT PPP: 31 SECONDS (ref 22.7–35.4)
BASOPHILS # BLD AUTO: 0.02 10*3/MM3 (ref 0–0.2)
BASOPHILS NFR BLD AUTO: 0.3 % (ref 0–1.5)
BUN BLD-MCNC: 56 MG/DL (ref 8–23)
BUN/CREAT SERPL: 30.4 (ref 7–25)
CALCIUM SPEC-SCNC: 10 MG/DL (ref 8.6–10.5)
CHLORIDE SERPL-SCNC: 105 MMOL/L (ref 98–107)
CO2 SERPL-SCNC: 23 MMOL/L (ref 22–29)
CREAT BLD-MCNC: 1.84 MG/DL (ref 0.76–1.27)
DEPRECATED RDW RBC AUTO: 49.5 FL (ref 37–54)
EOSINOPHIL # BLD AUTO: 0.22 10*3/MM3 (ref 0–0.7)
EOSINOPHIL NFR BLD AUTO: 3.6 % (ref 0.3–6.2)
ERYTHROCYTE [DISTWIDTH] IN BLOOD BY AUTOMATED COUNT: 14.9 % (ref 11.5–14.5)
GFR SERPL CREATININE-BSD FRML MDRD: 36 ML/MIN/1.73
GLUCOSE BLD-MCNC: 109 MG/DL (ref 65–99)
HCT VFR BLD AUTO: 39.2 % (ref 40.4–52.2)
HGB BLD-MCNC: 12.5 G/DL (ref 13.7–17.6)
IMM GRANULOCYTES # BLD: 0 10*3/MM3 (ref 0–0.03)
IMM GRANULOCYTES NFR BLD: 0 % (ref 0–0.5)
INR PPP: 1.04 (ref 0.9–1.1)
LYMPHOCYTES # BLD AUTO: 1.39 10*3/MM3 (ref 0.9–4.8)
LYMPHOCYTES NFR BLD AUTO: 22.6 % (ref 19.6–45.3)
MCH RBC QN AUTO: 29.1 PG (ref 27–32.7)
MCHC RBC AUTO-ENTMCNC: 31.9 G/DL (ref 32.6–36.4)
MCV RBC AUTO: 91.4 FL (ref 79.8–96.2)
MONOCYTES # BLD AUTO: 0.25 10*3/MM3 (ref 0.2–1.2)
MONOCYTES NFR BLD AUTO: 4.1 % (ref 5–12)
NEUTROPHILS # BLD AUTO: 4.26 10*3/MM3 (ref 1.9–8.1)
NEUTROPHILS NFR BLD AUTO: 69.4 % (ref 42.7–76)
PLATELET # BLD AUTO: 196 10*3/MM3 (ref 140–500)
PMV BLD AUTO: 10.9 FL (ref 6–12)
POTASSIUM BLD-SCNC: 5.3 MMOL/L (ref 3.5–5.2)
PROTHROMBIN TIME: 13.4 SECONDS (ref 11.7–14.2)
RBC # BLD AUTO: 4.29 10*6/MM3 (ref 4.6–6)
SODIUM BLD-SCNC: 143 MMOL/L (ref 136–145)
WBC NRBC COR # BLD: 6.14 10*3/MM3 (ref 4.5–10.7)

## 2018-07-18 PROCEDURE — 85730 THROMBOPLASTIN TIME PARTIAL: CPT | Performed by: INTERNAL MEDICINE

## 2018-07-18 PROCEDURE — 80048 BASIC METABOLIC PNL TOTAL CA: CPT | Performed by: INTERNAL MEDICINE

## 2018-07-18 PROCEDURE — 85025 COMPLETE CBC W/AUTO DIFF WBC: CPT | Performed by: INTERNAL MEDICINE

## 2018-07-18 PROCEDURE — 85610 PROTHROMBIN TIME: CPT | Performed by: INTERNAL MEDICINE

## 2018-07-19 ENCOUNTER — HOSPITAL ENCOUNTER (INPATIENT)
Facility: HOSPITAL | Age: 73
LOS: 14 days | Discharge: NURSING, REHAB OR LONG-TERM CARE | End: 2018-08-02
Attending: INTERNAL MEDICINE | Admitting: INTERNAL MEDICINE

## 2018-07-19 ENCOUNTER — APPOINTMENT (OUTPATIENT)
Dept: ULTRASOUND IMAGING | Facility: HOSPITAL | Age: 73
End: 2018-07-19

## 2018-07-19 DIAGNOSIS — Z95.1 S/P CABG X 2: ICD-10-CM

## 2018-07-19 DIAGNOSIS — Z95.2 S/P AVR (AORTIC VALVE REPLACEMENT): ICD-10-CM

## 2018-07-19 DIAGNOSIS — I35.0 NONRHEUMATIC AORTIC VALVE STENOSIS: ICD-10-CM

## 2018-07-19 DIAGNOSIS — I10 ESSENTIAL HYPERTENSION: ICD-10-CM

## 2018-07-19 DIAGNOSIS — I25.10 ARTERIOSCLEROSIS OF CORONARY ARTERY: Primary | ICD-10-CM

## 2018-07-19 DIAGNOSIS — E08.00 DIABETES MELLITUS DUE TO UNDERLYING CONDITION WITH HYPEROSMOLARITY WITHOUT COMA, WITHOUT LONG-TERM CURRENT USE OF INSULIN (HCC): ICD-10-CM

## 2018-07-19 DIAGNOSIS — R26.89 DECREASED MOBILITY: ICD-10-CM

## 2018-07-19 DIAGNOSIS — I65.23 ASYMPTOMATIC BILATERAL CAROTID ARTERY STENOSIS: ICD-10-CM

## 2018-07-19 PROBLEM — N28.9 RENAL INSUFFICIENCY: Status: ACTIVE | Noted: 2018-07-19

## 2018-07-19 LAB
BH CV XLRA MEAS - DIST GSV CALF DIST LEFT: 0.13 CM
BH CV XLRA MEAS - DIST GSV CALF DIST RIGHT: 0.38 CM
BH CV XLRA MEAS - DIST GSV THIGH DIST LEFT: 0.22 CM
BH CV XLRA MEAS - DIST GSV THIGH DIST RIGHT: 0.32 CM
BH CV XLRA MEAS - GSV KNEE DIST LEFT: 0.18 CM
BH CV XLRA MEAS - GSV KNEE DIST RIGHT: 0.25 CM
BH CV XLRA MEAS - GSV ORIGIN DIST LEFT: 0.53 CM
BH CV XLRA MEAS - GSV ORIGIN DIST RIGHT: 0.62 CM
BH CV XLRA MEAS - MID GSV CALF LEFT: 0.1 CM
BH CV XLRA MEAS - MID GSV CALF RIGHT: 0.19 CM
BH CV XLRA MEAS - MID GSV THIGH  LEFT: 0.28 CM
BH CV XLRA MEAS - MID GSV THIGH  RIGHT: 0.27 CM
BH CV XLRA MEAS - PROX GSV CALF DIST LEFT: 0.18 CM
BH CV XLRA MEAS - PROX GSV CALF DIST RIGHT: 0.18 CM
BH CV XLRA MEAS - PROX GSV THIGH  LEFT: 0.3 CM
BH CV XLRA MEAS - PROX GSV THIGH  RIGHT: 0.42 CM
GLUCOSE BLDC GLUCOMTR-MCNC: 112 MG/DL (ref 70–130)

## 2018-07-19 PROCEDURE — 93005 ELECTROCARDIOGRAM TRACING: CPT | Performed by: NURSE PRACTITIONER

## 2018-07-19 PROCEDURE — 76775 US EXAM ABDO BACK WALL LIM: CPT

## 2018-07-19 PROCEDURE — 93010 ELECTROCARDIOGRAM REPORT: CPT | Performed by: INTERNAL MEDICINE

## 2018-07-19 PROCEDURE — 82962 GLUCOSE BLOOD TEST: CPT

## 2018-07-19 PROCEDURE — 99222 1ST HOSP IP/OBS MODERATE 55: CPT | Performed by: INTERNAL MEDICINE

## 2018-07-19 PROCEDURE — G0378 HOSPITAL OBSERVATION PER HR: HCPCS

## 2018-07-19 RX ORDER — ACETYLCYSTEINE 200 MG/ML
600 SOLUTION ORAL; RESPIRATORY (INHALATION) EVERY 12 HOURS SCHEDULED
Status: COMPLETED | OUTPATIENT
Start: 2018-07-19 | End: 2018-07-21

## 2018-07-19 RX ORDER — GLIPIZIDE 5 MG/1
5 TABLET ORAL
Status: DISCONTINUED | OUTPATIENT
Start: 2018-07-20 | End: 2018-07-23

## 2018-07-19 RX ORDER — NITROGLYCERIN 0.4 MG/1
0.4 TABLET SUBLINGUAL
Status: DISCONTINUED | OUTPATIENT
Start: 2018-07-19 | End: 2018-07-23

## 2018-07-19 RX ORDER — ATENOLOL 50 MG/1
50 TABLET ORAL DAILY
Status: DISCONTINUED | OUTPATIENT
Start: 2018-07-19 | End: 2018-07-23

## 2018-07-19 RX ORDER — CLOPIDOGREL BISULFATE 75 MG/1
75 TABLET ORAL DAILY
Status: DISCONTINUED | OUTPATIENT
Start: 2018-07-19 | End: 2018-07-20

## 2018-07-19 RX ORDER — ATORVASTATIN CALCIUM 20 MG/1
40 TABLET, FILM COATED ORAL NIGHTLY
Status: DISCONTINUED | OUTPATIENT
Start: 2018-07-19 | End: 2018-07-21

## 2018-07-19 RX ADMIN — SODIUM BICARBONATE: 84 INJECTION, SOLUTION INTRAVENOUS at 21:22

## 2018-07-19 RX ADMIN — CLOPIDOGREL 75 MG: 75 TABLET, FILM COATED ORAL at 21:23

## 2018-07-19 RX ADMIN — ACETYLCYSTEINE 600 MG: 200 SOLUTION ORAL; RESPIRATORY (INHALATION) at 21:24

## 2018-07-19 RX ADMIN — ATORVASTATIN CALCIUM 40 MG: 20 TABLET, FILM COATED ORAL at 21:23

## 2018-07-19 NOTE — PROGRESS NOTES
Spoke with patient informed per dr. beverly his office will be calling him about admitting him to the hospital today

## 2018-07-19 NOTE — NURSING NOTE
Called and spoke with dr. major advised of labs md informed to call patient and let him know that his office will be calling him about being admitted to do kidney function.

## 2018-07-20 ENCOUNTER — APPOINTMENT (OUTPATIENT)
Dept: GENERAL RADIOLOGY | Facility: HOSPITAL | Age: 73
End: 2018-07-20
Attending: THORACIC SURGERY (CARDIOTHORACIC VASCULAR SURGERY)

## 2018-07-20 LAB
ALBUMIN SERPL-MCNC: 3.9 G/DL (ref 3.5–5.2)
ALBUMIN/GLOB SERPL: 1.4 G/DL
ALP SERPL-CCNC: 82 U/L (ref 39–117)
ALT SERPL W P-5'-P-CCNC: 11 U/L (ref 1–41)
ANION GAP SERPL CALCULATED.3IONS-SCNC: 12.6 MMOL/L
AST SERPL-CCNC: 12 U/L (ref 1–40)
BASOPHILS # BLD AUTO: 0.02 10*3/MM3 (ref 0–0.2)
BASOPHILS NFR BLD AUTO: 0.4 % (ref 0–1.5)
BILIRUB SERPL-MCNC: 0.5 MG/DL (ref 0.1–1.2)
BILIRUB UR QL STRIP: NEGATIVE
BUN BLD-MCNC: 44 MG/DL (ref 8–23)
BUN/CREAT SERPL: 31.7 (ref 7–25)
CALCIUM SPEC-SCNC: 9.1 MG/DL (ref 8.6–10.5)
CHLORIDE SERPL-SCNC: 104 MMOL/L (ref 98–107)
CLARITY UR: CLEAR
CO2 SERPL-SCNC: 25.4 MMOL/L (ref 22–29)
COLOR UR: YELLOW
CREAT BLD-MCNC: 1.39 MG/DL (ref 0.76–1.27)
CREAT UR-MCNC: 70.4 MG/DL
DEPRECATED RDW RBC AUTO: 49.1 FL (ref 37–54)
EOSINOPHIL # BLD AUTO: 0.27 10*3/MM3 (ref 0–0.7)
EOSINOPHIL NFR BLD AUTO: 5.8 % (ref 0.3–6.2)
ERYTHROCYTE [DISTWIDTH] IN BLOOD BY AUTOMATED COUNT: 14.6 % (ref 11.5–14.5)
GFR SERPL CREATININE-BSD FRML MDRD: 50 ML/MIN/1.73
GLOBULIN UR ELPH-MCNC: 2.8 GM/DL
GLUCOSE BLD-MCNC: 103 MG/DL (ref 65–99)
GLUCOSE BLDC GLUCOMTR-MCNC: 115 MG/DL (ref 70–130)
GLUCOSE BLDC GLUCOMTR-MCNC: 145 MG/DL (ref 70–130)
GLUCOSE BLDC GLUCOMTR-MCNC: 229 MG/DL (ref 70–130)
GLUCOSE UR STRIP-MCNC: NEGATIVE MG/DL
HCT VFR BLD AUTO: 36.8 % (ref 40.4–52.2)
HGB BLD-MCNC: 11.5 G/DL (ref 13.7–17.6)
HGB UR QL STRIP.AUTO: NEGATIVE
IMM GRANULOCYTES # BLD: 0 10*3/MM3 (ref 0–0.03)
IMM GRANULOCYTES NFR BLD: 0 % (ref 0–0.5)
INR PPP: 1.1 (ref 0.9–1.1)
KETONES UR QL STRIP: NEGATIVE
LEUKOCYTE ESTERASE UR QL STRIP.AUTO: NEGATIVE
LYMPHOCYTES # BLD AUTO: 1.59 10*3/MM3 (ref 0.9–4.8)
LYMPHOCYTES NFR BLD AUTO: 34.1 % (ref 19.6–45.3)
MCH RBC QN AUTO: 28.6 PG (ref 27–32.7)
MCHC RBC AUTO-ENTMCNC: 31.3 G/DL (ref 32.6–36.4)
MCV RBC AUTO: 91.5 FL (ref 79.8–96.2)
MONOCYTES # BLD AUTO: 0.3 10*3/MM3 (ref 0.2–1.2)
MONOCYTES NFR BLD AUTO: 6.4 % (ref 5–12)
NEUTROPHILS # BLD AUTO: 2.48 10*3/MM3 (ref 1.9–8.1)
NEUTROPHILS NFR BLD AUTO: 53.3 % (ref 42.7–76)
NITRITE UR QL STRIP: NEGATIVE
PH UR STRIP.AUTO: <=5 [PH] (ref 5–8)
PHOSPHATE SERPL-MCNC: 3.6 MG/DL (ref 2.5–4.5)
PLATELET # BLD AUTO: 172 10*3/MM3 (ref 140–500)
PMV BLD AUTO: 10.4 FL (ref 6–12)
POTASSIUM BLD-SCNC: 4.2 MMOL/L (ref 3.5–5.2)
PROT SERPL-MCNC: 6.7 G/DL (ref 6–8.5)
PROT UR QL STRIP: ABNORMAL
PROT UR-MCNC: 22 MG/DL
PROTHROMBIN TIME: 14 SECONDS (ref 11.7–14.2)
RBC # BLD AUTO: 4.02 10*6/MM3 (ref 4.6–6)
SODIUM BLD-SCNC: 142 MMOL/L (ref 136–145)
SP GR UR STRIP: 1.02 (ref 1–1.03)
UROBILINOGEN UR QL STRIP: ABNORMAL
WBC NRBC COR # BLD: 4.66 10*3/MM3 (ref 4.5–10.7)

## 2018-07-20 PROCEDURE — 25010000002 HEPARIN (PORCINE) PER 1000 UNITS: Performed by: INTERNAL MEDICINE

## 2018-07-20 PROCEDURE — 25010000002 FENTANYL CITRATE (PF) 100 MCG/2ML SOLUTION: Performed by: INTERNAL MEDICINE

## 2018-07-20 PROCEDURE — 81003 URINALYSIS AUTO W/O SCOPE: CPT | Performed by: INTERNAL MEDICINE

## 2018-07-20 PROCEDURE — B2111ZZ FLUOROSCOPY OF MULTIPLE CORONARY ARTERIES USING LOW OSMOLAR CONTRAST: ICD-10-PCS | Performed by: INTERNAL MEDICINE

## 2018-07-20 PROCEDURE — 82784 ASSAY IGA/IGD/IGG/IGM EACH: CPT | Performed by: INTERNAL MEDICINE

## 2018-07-20 PROCEDURE — 85610 PROTHROMBIN TIME: CPT | Performed by: NURSE PRACTITIONER

## 2018-07-20 PROCEDURE — 25010000002 MIDAZOLAM PER 1 MG: Performed by: INTERNAL MEDICINE

## 2018-07-20 PROCEDURE — 85025 COMPLETE CBC W/AUTO DIFF WBC: CPT | Performed by: NURSE PRACTITIONER

## 2018-07-20 PROCEDURE — 84156 ASSAY OF PROTEIN URINE: CPT | Performed by: INTERNAL MEDICINE

## 2018-07-20 PROCEDURE — 82962 GLUCOSE BLOOD TEST: CPT

## 2018-07-20 PROCEDURE — 86334 IMMUNOFIX E-PHORESIS SERUM: CPT | Performed by: INTERNAL MEDICINE

## 2018-07-20 PROCEDURE — 71046 X-RAY EXAM CHEST 2 VIEWS: CPT

## 2018-07-20 PROCEDURE — 93458 L HRT ARTERY/VENTRICLE ANGIO: CPT | Performed by: INTERNAL MEDICINE

## 2018-07-20 PROCEDURE — 84100 ASSAY OF PHOSPHORUS: CPT | Performed by: INTERNAL MEDICINE

## 2018-07-20 PROCEDURE — C1769 GUIDE WIRE: HCPCS | Performed by: INTERNAL MEDICINE

## 2018-07-20 PROCEDURE — B2151ZZ FLUOROSCOPY OF LEFT HEART USING LOW OSMOLAR CONTRAST: ICD-10-PCS | Performed by: INTERNAL MEDICINE

## 2018-07-20 PROCEDURE — 80053 COMPREHEN METABOLIC PANEL: CPT | Performed by: NURSE PRACTITIONER

## 2018-07-20 PROCEDURE — 82570 ASSAY OF URINE CREATININE: CPT | Performed by: INTERNAL MEDICINE

## 2018-07-20 PROCEDURE — 4A023N7 MEASUREMENT OF CARDIAC SAMPLING AND PRESSURE, LEFT HEART, PERCUTANEOUS APPROACH: ICD-10-PCS | Performed by: INTERNAL MEDICINE

## 2018-07-20 PROCEDURE — 0 IOPAMIDOL PER 1 ML: Performed by: INTERNAL MEDICINE

## 2018-07-20 PROCEDURE — C1894 INTRO/SHEATH, NON-LASER: HCPCS | Performed by: INTERNAL MEDICINE

## 2018-07-20 PROCEDURE — 86335 IMMUNFIX E-PHORSIS/URINE/CSF: CPT | Performed by: INTERNAL MEDICINE

## 2018-07-20 RX ORDER — ALPRAZOLAM 0.25 MG/1
0.25 TABLET ORAL EVERY 8 HOURS PRN
Status: DISCONTINUED | OUTPATIENT
Start: 2018-07-20 | End: 2018-07-23

## 2018-07-20 RX ORDER — CHLORHEXIDINE GLUCONATE 0.12 MG/ML
15 RINSE ORAL EVERY 12 HOURS SCHEDULED
Status: COMPLETED | OUTPATIENT
Start: 2018-07-22 | End: 2018-07-23

## 2018-07-20 RX ORDER — SODIUM CHLORIDE 9 MG/ML
100 INJECTION, SOLUTION INTRAVENOUS CONTINUOUS
Status: DISCONTINUED | OUTPATIENT
Start: 2018-07-20 | End: 2018-07-21

## 2018-07-20 RX ORDER — SODIUM CHLORIDE 0.9 % (FLUSH) 0.9 %
1-10 SYRINGE (ML) INJECTION AS NEEDED
Status: DISCONTINUED | OUTPATIENT
Start: 2018-07-20 | End: 2018-07-20 | Stop reason: HOSPADM

## 2018-07-20 RX ORDER — MIDAZOLAM HYDROCHLORIDE 1 MG/ML
INJECTION INTRAMUSCULAR; INTRAVENOUS AS NEEDED
Status: DISCONTINUED | OUTPATIENT
Start: 2018-07-20 | End: 2018-07-20 | Stop reason: HOSPADM

## 2018-07-20 RX ORDER — ACETAMINOPHEN 325 MG/1
650 TABLET ORAL EVERY 4 HOURS PRN
Status: DISCONTINUED | OUTPATIENT
Start: 2018-07-20 | End: 2018-07-23

## 2018-07-20 RX ORDER — SODIUM CHLORIDE 9 MG/ML
75 INJECTION, SOLUTION INTRAVENOUS CONTINUOUS
Status: DISCONTINUED | OUTPATIENT
Start: 2018-07-20 | End: 2018-07-21

## 2018-07-20 RX ORDER — FENTANYL CITRATE 50 UG/ML
INJECTION, SOLUTION INTRAMUSCULAR; INTRAVENOUS AS NEEDED
Status: DISCONTINUED | OUTPATIENT
Start: 2018-07-20 | End: 2018-07-20 | Stop reason: HOSPADM

## 2018-07-20 RX ORDER — ONDANSETRON 2 MG/ML
4 INJECTION INTRAMUSCULAR; INTRAVENOUS EVERY 6 HOURS PRN
Status: DISCONTINUED | OUTPATIENT
Start: 2018-07-20 | End: 2018-07-20 | Stop reason: HOSPADM

## 2018-07-20 RX ORDER — SODIUM CHLORIDE 9 MG/ML
INJECTION, SOLUTION INTRAVENOUS CONTINUOUS PRN
Status: COMPLETED | OUTPATIENT
Start: 2018-07-20 | End: 2018-07-20

## 2018-07-20 RX ORDER — CHLORHEXIDINE GLUCONATE 500 MG/1
1 CLOTH TOPICAL EVERY 12 HOURS
Status: DISCONTINUED | OUTPATIENT
Start: 2018-07-22 | End: 2018-07-23

## 2018-07-20 RX ORDER — ACETAMINOPHEN 325 MG/1
650 TABLET ORAL EVERY 4 HOURS PRN
Status: DISCONTINUED | OUTPATIENT
Start: 2018-07-20 | End: 2018-07-20 | Stop reason: HOSPADM

## 2018-07-20 RX ORDER — LIDOCAINE HYDROCHLORIDE 20 MG/ML
INJECTION, SOLUTION INFILTRATION; PERINEURAL AS NEEDED
Status: DISCONTINUED | OUTPATIENT
Start: 2018-07-20 | End: 2018-07-20 | Stop reason: HOSPADM

## 2018-07-20 RX ORDER — TEMAZEPAM 7.5 MG/1
7.5 CAPSULE ORAL NIGHTLY PRN
Status: DISCONTINUED | OUTPATIENT
Start: 2018-07-20 | End: 2018-07-23

## 2018-07-20 RX ORDER — DIPHENHYDRAMINE HCL 25 MG
25 CAPSULE ORAL NIGHTLY PRN
Status: DISCONTINUED | OUTPATIENT
Start: 2018-07-20 | End: 2018-07-23

## 2018-07-20 RX ADMIN — ACETYLCYSTEINE 600 MG: 200 SOLUTION ORAL; RESPIRATORY (INHALATION) at 21:54

## 2018-07-20 RX ADMIN — GLIPIZIDE 5 MG: 5 TABLET ORAL at 06:54

## 2018-07-20 RX ADMIN — ACETYLCYSTEINE 600 MG: 200 SOLUTION ORAL; RESPIRATORY (INHALATION) at 09:57

## 2018-07-20 RX ADMIN — ATORVASTATIN CALCIUM 40 MG: 20 TABLET, FILM COATED ORAL at 21:53

## 2018-07-20 RX ADMIN — CLOPIDOGREL 75 MG: 75 TABLET, FILM COATED ORAL at 09:57

## 2018-07-20 RX ADMIN — TEMAZEPAM 7.5 MG: 7.5 CAPSULE ORAL at 23:37

## 2018-07-21 ENCOUNTER — APPOINTMENT (OUTPATIENT)
Dept: CARDIOLOGY | Facility: HOSPITAL | Age: 73
End: 2018-07-21
Attending: THORACIC SURGERY (CARDIOTHORACIC VASCULAR SURGERY)

## 2018-07-21 LAB
ABO GROUP BLD: NORMAL
ALBUMIN SERPL-MCNC: 3.8 G/DL (ref 3.5–5.2)
ALBUMIN/GLOB SERPL: 1.3 G/DL
ALP SERPL-CCNC: 82 U/L (ref 39–117)
ALT SERPL W P-5'-P-CCNC: 10 U/L (ref 1–41)
ANION GAP SERPL CALCULATED.3IONS-SCNC: 11.1 MMOL/L
APTT PPP: 31.1 SECONDS (ref 22.7–35.4)
AST SERPL-CCNC: 10 U/L (ref 1–40)
BASOPHILS # BLD AUTO: 0.01 10*3/MM3 (ref 0–0.2)
BASOPHILS NFR BLD AUTO: 0.2 % (ref 0–1.5)
BH CV XLRA MEAS LEFT CCA RATIO VEL: 98.5 CM/SEC
BH CV XLRA MEAS LEFT DIST CCA EDV: 17.6 CM/SEC
BH CV XLRA MEAS LEFT DIST CCA PSV: 98.5 CM/SEC
BH CV XLRA MEAS LEFT DIST ICA EDV: -19.3 CM/SEC
BH CV XLRA MEAS LEFT DIST ICA PSV: -67.4 CM/SEC
BH CV XLRA MEAS LEFT ICA RATIO VEL: -130 CM/SEC
BH CV XLRA MEAS LEFT ICA/CCA RATIO: -1.3
BH CV XLRA MEAS LEFT MID ICA EDV: -19.9 CM/SEC
BH CV XLRA MEAS LEFT MID ICA PSV: -62.1 CM/SEC
BH CV XLRA MEAS LEFT PROX CCA EDV: 11.7 CM/SEC
BH CV XLRA MEAS LEFT PROX CCA PSV: 85 CM/SEC
BH CV XLRA MEAS LEFT PROX ECA EDV: -9.4 CM/SEC
BH CV XLRA MEAS LEFT PROX ECA PSV: -95.9 CM/SEC
BH CV XLRA MEAS LEFT PROX ICA EDV: -26.7 CM/SEC
BH CV XLRA MEAS LEFT PROX ICA PSV: -130 CM/SEC
BH CV XLRA MEAS LEFT PROX SCLA PSV: 86.8 CM/SEC
BH CV XLRA MEAS LEFT VERTEBRAL A EDV: 10.2 CM/SEC
BH CV XLRA MEAS LEFT VERTEBRAL A PSV: 47.1 CM/SEC
BH CV XLRA MEAS RIGHT CCA RATIO VEL: 82.1 CM/SEC
BH CV XLRA MEAS RIGHT DIST CCA EDV: 17 CM/SEC
BH CV XLRA MEAS RIGHT DIST CCA PSV: 82.1 CM/SEC
BH CV XLRA MEAS RIGHT DIST ICA EDV: -21.1 CM/SEC
BH CV XLRA MEAS RIGHT DIST ICA PSV: -66.3 CM/SEC
BH CV XLRA MEAS RIGHT ICA RATIO VEL: -207 CM/SEC
BH CV XLRA MEAS RIGHT ICA/CCA RATIO: -2.5
BH CV XLRA MEAS RIGHT MID ICA EDV: -18.9 CM/SEC
BH CV XLRA MEAS RIGHT MID ICA PSV: -92.7 CM/SEC
BH CV XLRA MEAS RIGHT PROX CCA EDV: 13.5 CM/SEC
BH CV XLRA MEAS RIGHT PROX CCA PSV: 81.5 CM/SEC
BH CV XLRA MEAS RIGHT PROX ECA EDV: -11.8 CM/SEC
BH CV XLRA MEAS RIGHT PROX ECA PSV: -253 CM/SEC
BH CV XLRA MEAS RIGHT PROX ICA EDV: -34 CM/SEC
BH CV XLRA MEAS RIGHT PROX ICA PSV: -207 CM/SEC
BH CV XLRA MEAS RIGHT PROX SCLA PSV: 139 CM/SEC
BH CV XLRA MEAS RIGHT VERTEBRAL A EDV: 11.8 CM/SEC
BH CV XLRA MEAS RIGHT VERTEBRAL A PSV: 36.1 CM/SEC
BILIRUB SERPL-MCNC: 0.5 MG/DL (ref 0.1–1.2)
BILIRUB UR QL STRIP: NEGATIVE
BLD GP AB SCN SERPL QL: NEGATIVE
BUN BLD-MCNC: 34 MG/DL (ref 8–23)
BUN/CREAT SERPL: 25 (ref 7–25)
CALCIUM SPEC-SCNC: 9.5 MG/DL (ref 8.6–10.5)
CHLORIDE SERPL-SCNC: 104 MMOL/L (ref 98–107)
CHOLEST SERPL-MCNC: 96 MG/DL (ref 0–200)
CLARITY UR: CLEAR
CLOSE TME COLL+ADP + EPINEP PNL BLD: 46 % (ref 86–100)
CO2 SERPL-SCNC: 26.9 MMOL/L (ref 22–29)
COLOR UR: YELLOW
CREAT BLD-MCNC: 1.36 MG/DL (ref 0.76–1.27)
DEPRECATED RDW RBC AUTO: 47.2 FL (ref 37–54)
EOSINOPHIL # BLD AUTO: 0.26 10*3/MM3 (ref 0–0.7)
EOSINOPHIL NFR BLD AUTO: 5.2 % (ref 0.3–6.2)
ERYTHROCYTE [DISTWIDTH] IN BLOOD BY AUTOMATED COUNT: 14.4 % (ref 11.5–14.5)
GFR SERPL CREATININE-BSD FRML MDRD: 51 ML/MIN/1.73
GLOBULIN UR ELPH-MCNC: 2.9 GM/DL
GLUCOSE BLD-MCNC: 121 MG/DL (ref 65–99)
GLUCOSE BLDC GLUCOMTR-MCNC: 126 MG/DL (ref 70–130)
GLUCOSE BLDC GLUCOMTR-MCNC: 149 MG/DL (ref 70–130)
GLUCOSE BLDC GLUCOMTR-MCNC: 97 MG/DL (ref 70–130)
GLUCOSE UR STRIP-MCNC: NEGATIVE MG/DL
HBA1C MFR BLD: 5.8 % (ref 4.8–5.6)
HCT VFR BLD AUTO: 37.2 % (ref 40.4–52.2)
HDLC SERPL-MCNC: 33 MG/DL (ref 40–60)
HGB BLD-MCNC: 11.8 G/DL (ref 13.7–17.6)
HGB UR QL STRIP.AUTO: NEGATIVE
IMM GRANULOCYTES # BLD: 0 10*3/MM3 (ref 0–0.03)
IMM GRANULOCYTES NFR BLD: 0 % (ref 0–0.5)
INR PPP: 1.09 (ref 0.9–1.1)
KETONES UR QL STRIP: NEGATIVE
LDLC SERPL CALC-MCNC: 46 MG/DL (ref 0–100)
LDLC/HDLC SERPL: 1.39 {RATIO}
LEUKOCYTE ESTERASE UR QL STRIP.AUTO: NEGATIVE
LYMPHOCYTES # BLD AUTO: 1.41 10*3/MM3 (ref 0.9–4.8)
LYMPHOCYTES NFR BLD AUTO: 28.4 % (ref 19.6–45.3)
MAGNESIUM SERPL-MCNC: 2.3 MG/DL (ref 1.6–2.4)
MCH RBC QN AUTO: 28.5 PG (ref 27–32.7)
MCHC RBC AUTO-ENTMCNC: 31.7 G/DL (ref 32.6–36.4)
MCV RBC AUTO: 89.9 FL (ref 79.8–96.2)
MONOCYTES # BLD AUTO: 0.34 10*3/MM3 (ref 0.2–1.2)
MONOCYTES NFR BLD AUTO: 6.8 % (ref 5–12)
NEUTROPHILS # BLD AUTO: 2.95 10*3/MM3 (ref 1.9–8.1)
NEUTROPHILS NFR BLD AUTO: 59.4 % (ref 42.7–76)
NITRITE UR QL STRIP: NEGATIVE
NT-PROBNP SERPL-MCNC: 357.5 PG/ML (ref 0–900)
PH UR STRIP.AUTO: <=5 [PH] (ref 5–8)
PHOSPHATE SERPL-MCNC: 4.2 MG/DL (ref 2.5–4.5)
PLATELET # BLD AUTO: 160 10*3/MM3 (ref 140–500)
PMV BLD AUTO: 10.3 FL (ref 6–12)
POTASSIUM BLD-SCNC: 4.4 MMOL/L (ref 3.5–5.2)
PROT SERPL-MCNC: 6.7 G/DL (ref 6–8.5)
PROT UR QL STRIP: NEGATIVE
PROTHROMBIN TIME: 13.9 SECONDS (ref 11.7–14.2)
RBC # BLD AUTO: 4.14 10*6/MM3 (ref 4.6–6)
RH BLD: POSITIVE
RIGHT ARM BP: NORMAL MMHG
SODIUM BLD-SCNC: 142 MMOL/L (ref 136–145)
SP GR UR STRIP: 1.01 (ref 1–1.03)
T&S EXPIRATION DATE: NORMAL
TRIGL SERPL-MCNC: 86 MG/DL (ref 0–150)
UROBILINOGEN UR QL STRIP: NORMAL
VLDLC SERPL-MCNC: 17.2 MG/DL (ref 5–40)
WBC NRBC COR # BLD: 4.97 10*3/MM3 (ref 4.5–10.7)

## 2018-07-21 PROCEDURE — 81003 URINALYSIS AUTO W/O SCOPE: CPT | Performed by: THORACIC SURGERY (CARDIOTHORACIC VASCULAR SURGERY)

## 2018-07-21 PROCEDURE — 93970 EXTREMITY STUDY: CPT

## 2018-07-21 PROCEDURE — 84100 ASSAY OF PHOSPHORUS: CPT | Performed by: INTERNAL MEDICINE

## 2018-07-21 PROCEDURE — 85610 PROTHROMBIN TIME: CPT | Performed by: THORACIC SURGERY (CARDIOTHORACIC VASCULAR SURGERY)

## 2018-07-21 PROCEDURE — 83880 ASSAY OF NATRIURETIC PEPTIDE: CPT | Performed by: THORACIC SURGERY (CARDIOTHORACIC VASCULAR SURGERY)

## 2018-07-21 PROCEDURE — 99232 SBSQ HOSP IP/OBS MODERATE 35: CPT | Performed by: NURSE PRACTITIONER

## 2018-07-21 PROCEDURE — 85576 BLOOD PLATELET AGGREGATION: CPT | Performed by: THORACIC SURGERY (CARDIOTHORACIC VASCULAR SURGERY)

## 2018-07-21 PROCEDURE — 99024 POSTOP FOLLOW-UP VISIT: CPT | Performed by: THORACIC SURGERY (CARDIOTHORACIC VASCULAR SURGERY)

## 2018-07-21 PROCEDURE — 83735 ASSAY OF MAGNESIUM: CPT | Performed by: THORACIC SURGERY (CARDIOTHORACIC VASCULAR SURGERY)

## 2018-07-21 PROCEDURE — 80053 COMPREHEN METABOLIC PANEL: CPT | Performed by: THORACIC SURGERY (CARDIOTHORACIC VASCULAR SURGERY)

## 2018-07-21 PROCEDURE — 85025 COMPLETE CBC W/AUTO DIFF WBC: CPT | Performed by: INTERNAL MEDICINE

## 2018-07-21 PROCEDURE — 86901 BLOOD TYPING SEROLOGIC RH(D): CPT | Performed by: THORACIC SURGERY (CARDIOTHORACIC VASCULAR SURGERY)

## 2018-07-21 PROCEDURE — 80061 LIPID PANEL: CPT | Performed by: INTERNAL MEDICINE

## 2018-07-21 PROCEDURE — 82962 GLUCOSE BLOOD TEST: CPT

## 2018-07-21 PROCEDURE — 85730 THROMBOPLASTIN TIME PARTIAL: CPT | Performed by: THORACIC SURGERY (CARDIOTHORACIC VASCULAR SURGERY)

## 2018-07-21 PROCEDURE — 86923 COMPATIBILITY TEST ELECTRIC: CPT

## 2018-07-21 PROCEDURE — 86900 BLOOD TYPING SEROLOGIC ABO: CPT | Performed by: THORACIC SURGERY (CARDIOTHORACIC VASCULAR SURGERY)

## 2018-07-21 PROCEDURE — 99233 SBSQ HOSP IP/OBS HIGH 50: CPT | Performed by: NURSE PRACTITIONER

## 2018-07-21 PROCEDURE — 86850 RBC ANTIBODY SCREEN: CPT | Performed by: THORACIC SURGERY (CARDIOTHORACIC VASCULAR SURGERY)

## 2018-07-21 PROCEDURE — 83036 HEMOGLOBIN GLYCOSYLATED A1C: CPT | Performed by: THORACIC SURGERY (CARDIOTHORACIC VASCULAR SURGERY)

## 2018-07-21 PROCEDURE — 93880 EXTRACRANIAL BILAT STUDY: CPT

## 2018-07-21 RX ORDER — FUROSEMIDE 40 MG/1
40 TABLET ORAL ONCE
Status: COMPLETED | OUTPATIENT
Start: 2018-07-21 | End: 2018-07-21

## 2018-07-21 RX ORDER — HYDRALAZINE HYDROCHLORIDE 20 MG/ML
10 INJECTION INTRAMUSCULAR; INTRAVENOUS EVERY 6 HOURS PRN
Status: DISCONTINUED | OUTPATIENT
Start: 2018-07-21 | End: 2018-07-23

## 2018-07-21 RX ORDER — ATORVASTATIN CALCIUM 80 MG/1
80 TABLET, FILM COATED ORAL NIGHTLY
Status: DISCONTINUED | OUTPATIENT
Start: 2018-07-21 | End: 2018-07-23

## 2018-07-21 RX ADMIN — FUROSEMIDE 40 MG: 40 TABLET ORAL at 15:01

## 2018-07-21 RX ADMIN — ATORVASTATIN CALCIUM 80 MG: 80 TABLET, FILM COATED ORAL at 20:45

## 2018-07-21 RX ADMIN — ACETYLCYSTEINE 600 MG: 200 SOLUTION ORAL; RESPIRATORY (INHALATION) at 08:56

## 2018-07-21 RX ADMIN — GLIPIZIDE 5 MG: 5 TABLET ORAL at 07:00

## 2018-07-21 RX ADMIN — TEMAZEPAM 7.5 MG: 7.5 CAPSULE ORAL at 23:21

## 2018-07-22 LAB
ALBUMIN SERPL-MCNC: 3.9 G/DL (ref 3.5–5.2)
ANION GAP SERPL CALCULATED.3IONS-SCNC: 16.8 MMOL/L
ARTERIAL PATENCY WRIST A: POSITIVE
ATMOSPHERIC PRESS: 745.3 MMHG
BASE EXCESS BLDA CALC-SCNC: 3.4 MMOL/L (ref 0–2)
BASOPHILS # BLD AUTO: 0.01 10*3/MM3 (ref 0–0.2)
BASOPHILS NFR BLD AUTO: 0.2 % (ref 0–1.5)
BDY SITE: ABNORMAL
BUN BLD-MCNC: 33 MG/DL (ref 8–23)
BUN/CREAT SERPL: 21.9 (ref 7–25)
CALCIUM SPEC-SCNC: 9.5 MG/DL (ref 8.6–10.5)
CHLORIDE SERPL-SCNC: 100 MMOL/L (ref 98–107)
CLOSE TME COLL+ADP + EPINEP PNL BLD: 88 % (ref 86–100)
CO2 SERPL-SCNC: 23.2 MMOL/L (ref 22–29)
CREAT BLD-MCNC: 1.51 MG/DL (ref 0.76–1.27)
DEPRECATED RDW RBC AUTO: 46.5 FL (ref 37–54)
EOSINOPHIL # BLD AUTO: 0.26 10*3/MM3 (ref 0–0.7)
EOSINOPHIL NFR BLD AUTO: 5.3 % (ref 0.3–6.2)
ERYTHROCYTE [DISTWIDTH] IN BLOOD BY AUTOMATED COUNT: 14.4 % (ref 11.5–14.5)
GFR SERPL CREATININE-BSD FRML MDRD: 46 ML/MIN/1.73
GLUCOSE BLD-MCNC: 129 MG/DL (ref 65–99)
GLUCOSE BLDC GLUCOMTR-MCNC: 110 MG/DL (ref 70–130)
GLUCOSE BLDC GLUCOMTR-MCNC: 149 MG/DL (ref 70–130)
GLUCOSE BLDC GLUCOMTR-MCNC: 200 MG/DL (ref 70–130)
GLUCOSE BLDC GLUCOMTR-MCNC: 237 MG/DL (ref 70–130)
HCO3 BLDA-SCNC: 27.6 MMOL/L (ref 22–28)
HCT VFR BLD AUTO: 37.3 % (ref 40.4–52.2)
HGB BLD-MCNC: 12.7 G/DL (ref 13.7–17.6)
IMM GRANULOCYTES # BLD: 0 10*3/MM3 (ref 0–0.03)
IMM GRANULOCYTES NFR BLD: 0 % (ref 0–0.5)
LYMPHOCYTES # BLD AUTO: 1.55 10*3/MM3 (ref 0.9–4.8)
LYMPHOCYTES NFR BLD AUTO: 31.5 % (ref 19.6–45.3)
MCH RBC QN AUTO: 30.1 PG (ref 27–32.7)
MCHC RBC AUTO-ENTMCNC: 34 G/DL (ref 32.6–36.4)
MCV RBC AUTO: 88.4 FL (ref 79.8–96.2)
MODALITY: ABNORMAL
MONOCYTES # BLD AUTO: 0.29 10*3/MM3 (ref 0.2–1.2)
MONOCYTES NFR BLD AUTO: 5.9 % (ref 5–12)
NEUTROPHILS # BLD AUTO: 2.81 10*3/MM3 (ref 1.9–8.1)
NEUTROPHILS NFR BLD AUTO: 57.1 % (ref 42.7–76)
PCO2 BLDA: 39.6 MM HG (ref 35–45)
PH BLDA: 7.45 PH UNITS (ref 7.35–7.45)
PHOSPHATE SERPL-MCNC: 5 MG/DL (ref 2.5–4.5)
PLATELET # BLD AUTO: 189 10*3/MM3 (ref 140–500)
PMV BLD AUTO: 10.5 FL (ref 6–12)
PO2 BLDA: 80.3 MM HG (ref 80–100)
POTASSIUM BLD-SCNC: 4.1 MMOL/L (ref 3.5–5.2)
RBC # BLD AUTO: 4.22 10*6/MM3 (ref 4.6–6)
SAO2 % BLDCOA: 96.3 % (ref 92–99)
SODIUM BLD-SCNC: 140 MMOL/L (ref 136–145)
TOTAL RATE: 16 BREATHS/MINUTE
WBC NRBC COR # BLD: 4.92 10*3/MM3 (ref 4.5–10.7)

## 2018-07-22 PROCEDURE — 99233 SBSQ HOSP IP/OBS HIGH 50: CPT | Performed by: NURSE PRACTITIONER

## 2018-07-22 PROCEDURE — 85025 COMPLETE CBC W/AUTO DIFF WBC: CPT | Performed by: INTERNAL MEDICINE

## 2018-07-22 PROCEDURE — 82803 BLOOD GASES ANY COMBINATION: CPT

## 2018-07-22 PROCEDURE — 80069 RENAL FUNCTION PANEL: CPT | Performed by: INTERNAL MEDICINE

## 2018-07-22 PROCEDURE — 99232 SBSQ HOSP IP/OBS MODERATE 35: CPT | Performed by: NURSE PRACTITIONER

## 2018-07-22 PROCEDURE — 82962 GLUCOSE BLOOD TEST: CPT

## 2018-07-22 PROCEDURE — 36600 WITHDRAWAL OF ARTERIAL BLOOD: CPT

## 2018-07-22 PROCEDURE — 99024 POSTOP FOLLOW-UP VISIT: CPT | Performed by: THORACIC SURGERY (CARDIOTHORACIC VASCULAR SURGERY)

## 2018-07-22 PROCEDURE — 94799 UNLISTED PULMONARY SVC/PX: CPT

## 2018-07-22 PROCEDURE — 85576 BLOOD PLATELET AGGREGATION: CPT | Performed by: NURSE PRACTITIONER

## 2018-07-22 RX ORDER — AMLODIPINE BESYLATE 5 MG/1
5 TABLET ORAL
Status: DISCONTINUED | OUTPATIENT
Start: 2018-07-22 | End: 2018-07-23

## 2018-07-22 RX ADMIN — ATENOLOL 50 MG: 50 TABLET ORAL at 08:43

## 2018-07-22 RX ADMIN — MUPIROCIN 1 APPLICATION: 20 OINTMENT TOPICAL at 21:28

## 2018-07-22 RX ADMIN — CHLORHEXIDINE GLUCONATE 15 ML: 1.2 RINSE ORAL at 21:28

## 2018-07-22 RX ADMIN — ATORVASTATIN CALCIUM 80 MG: 80 TABLET, FILM COATED ORAL at 21:28

## 2018-07-22 RX ADMIN — ALPRAZOLAM 0.25 MG: 0.25 TABLET ORAL at 22:25

## 2018-07-22 RX ADMIN — AMLODIPINE BESYLATE 5 MG: 5 TABLET ORAL at 11:38

## 2018-07-22 RX ADMIN — GLIPIZIDE 5 MG: 5 TABLET ORAL at 07:22

## 2018-07-23 ENCOUNTER — APPOINTMENT (OUTPATIENT)
Dept: GENERAL RADIOLOGY | Facility: HOSPITAL | Age: 73
End: 2018-07-23

## 2018-07-23 ENCOUNTER — ANCILLARY PROCEDURE (OUTPATIENT)
Dept: PERIOP | Facility: HOSPITAL | Age: 73
End: 2018-07-23
Attending: ANESTHESIOLOGY

## 2018-07-23 ENCOUNTER — ANESTHESIA EVENT (OUTPATIENT)
Dept: PERIOP | Facility: HOSPITAL | Age: 73
End: 2018-07-23

## 2018-07-23 ENCOUNTER — ANESTHESIA (OUTPATIENT)
Dept: PERIOP | Facility: HOSPITAL | Age: 73
End: 2018-07-23

## 2018-07-23 LAB
ACT BLD: 131 SECONDS (ref 82–152)
ACT BLD: 323 SECONDS (ref 82–152)
ACT BLD: 351 SECONDS (ref 82–152)
ACT BLD: 362 SECONDS (ref 82–152)
ACT BLD: 389 SECONDS (ref 82–152)
ACT BLD: 499 SECONDS (ref 82–152)
ALBUMIN SERPL-MCNC: 3.6 G/DL (ref 3.5–5.2)
ALBUMIN SERPL-MCNC: 3.8 G/DL (ref 3.5–5.2)
ALBUMIN SERPL-MCNC: 4.2 G/DL (ref 3.5–5.2)
ANION GAP SERPL CALCULATED.3IONS-SCNC: 13 MMOL/L
ANION GAP SERPL CALCULATED.3IONS-SCNC: 13.5 MMOL/L
ANION GAP SERPL CALCULATED.3IONS-SCNC: 13.9 MMOL/L
APTT PPP: 34.1 SECONDS (ref 22.7–35.4)
ARTERIAL PATENCY WRIST A: ABNORMAL
ARTERIAL PATENCY WRIST A: ABNORMAL
ATMOSPHERIC PRESS: 749.1 MMHG
ATMOSPHERIC PRESS: 749.7 MMHG
BASE EXCESS BLDA CALC-SCNC: -0.3 MMOL/L (ref 0–2)
BASE EXCESS BLDA CALC-SCNC: 2.7 MMOL/L (ref 0–2)
BASE EXCESS BLDA CALC-SCNC: 3 MMOL/L (ref -5–5)
BASOPHILS # BLD AUTO: 0 10*3/MM3 (ref 0–0.2)
BASOPHILS # BLD AUTO: 0.01 10*3/MM3 (ref 0–0.2)
BASOPHILS NFR BLD AUTO: 0 % (ref 0–1.5)
BASOPHILS NFR BLD AUTO: 0.2 % (ref 0–1.5)
BDY SITE: ABNORMAL
BDY SITE: ABNORMAL
BUN BLD-MCNC: 33 MG/DL (ref 8–23)
BUN BLD-MCNC: 33 MG/DL (ref 8–23)
BUN BLD-MCNC: 39 MG/DL (ref 8–23)
BUN/CREAT SERPL: 21 (ref 7–25)
BUN/CREAT SERPL: 21.2 (ref 7–25)
BUN/CREAT SERPL: 23.1 (ref 7–25)
CA-I BLD-MCNC: 4.9 MG/DL (ref 4.6–5.4)
CA-I SERPL ISE-MCNC: 1.22 MMOL/L (ref 1.15–1.35)
CALCIUM SPEC-SCNC: 9.2 MG/DL (ref 8.6–10.5)
CALCIUM SPEC-SCNC: 9.2 MG/DL (ref 8.6–10.5)
CALCIUM SPEC-SCNC: 9.4 MG/DL (ref 8.6–10.5)
CHLORIDE SERPL-SCNC: 101 MMOL/L (ref 98–107)
CHLORIDE SERPL-SCNC: 104 MMOL/L (ref 98–107)
CHLORIDE SERPL-SCNC: 99 MMOL/L (ref 98–107)
CLOSE TME COLL+ADP + EPINEP PNL BLD: 53 % (ref 86–100)
CO2 BLDA-SCNC: 28 MMOL/L (ref 24–29)
CO2 SERPL-SCNC: 24.1 MMOL/L (ref 22–29)
CO2 SERPL-SCNC: 24.5 MMOL/L (ref 22–29)
CO2 SERPL-SCNC: 26 MMOL/L (ref 22–29)
CREAT BLD-MCNC: 1.56 MG/DL (ref 0.76–1.27)
CREAT BLD-MCNC: 1.57 MG/DL (ref 0.76–1.27)
CREAT BLD-MCNC: 1.69 MG/DL (ref 0.76–1.27)
DEPRECATED RDW RBC AUTO: 44.7 FL (ref 37–54)
DEPRECATED RDW RBC AUTO: 46.4 FL (ref 37–54)
EOSINOPHIL # BLD AUTO: 0.01 10*3/MM3 (ref 0–0.7)
EOSINOPHIL # BLD AUTO: 0.32 10*3/MM3 (ref 0–0.7)
EOSINOPHIL NFR BLD AUTO: 0.1 % (ref 0.3–6.2)
EOSINOPHIL NFR BLD AUTO: 5.2 % (ref 0.3–6.2)
ERYTHROCYTE [DISTWIDTH] IN BLOOD BY AUTOMATED COUNT: 13.9 % (ref 11.5–14.5)
ERYTHROCYTE [DISTWIDTH] IN BLOOD BY AUTOMATED COUNT: 14.2 % (ref 11.5–14.5)
FIBRINOGEN PPP-MCNC: 239 MG/DL (ref 219–464)
GFR SERPL CREATININE-BSD FRML MDRD: 40 ML/MIN/1.73
GFR SERPL CREATININE-BSD FRML MDRD: 44 ML/MIN/1.73
GFR SERPL CREATININE-BSD FRML MDRD: 44 ML/MIN/1.73
GLUCOSE BLD-MCNC: 121 MG/DL (ref 65–99)
GLUCOSE BLD-MCNC: 152 MG/DL (ref 65–99)
GLUCOSE BLD-MCNC: 170 MG/DL (ref 65–99)
GLUCOSE BLDC GLUCOMTR-MCNC: 111 MG/DL (ref 70–130)
GLUCOSE BLDC GLUCOMTR-MCNC: 115 MG/DL (ref 70–130)
GLUCOSE BLDC GLUCOMTR-MCNC: 125 MG/DL (ref 70–130)
GLUCOSE BLDC GLUCOMTR-MCNC: 137 MG/DL (ref 70–130)
GLUCOSE BLDC GLUCOMTR-MCNC: 137 MG/DL (ref 70–130)
GLUCOSE BLDC GLUCOMTR-MCNC: 160 MG/DL (ref 70–130)
GLUCOSE BLDC GLUCOMTR-MCNC: 168 MG/DL (ref 70–130)
GLUCOSE BLDC GLUCOMTR-MCNC: 169 MG/DL (ref 70–130)
HCO3 BLDA-SCNC: 25.6 MMOL/L (ref 22–28)
HCO3 BLDA-SCNC: 27.1 MMOL/L (ref 22–28)
HCO3 BLDA-SCNC: 27.2 MMOL/L (ref 22–26)
HCT VFR BLD AUTO: 23.7 % (ref 40.4–52.2)
HCT VFR BLD AUTO: 35.5 % (ref 40.4–52.2)
HCT VFR BLDA CALC: 25 % (ref 38–51)
HGB BLD-MCNC: 12 G/DL (ref 13.7–17.6)
HGB BLD-MCNC: 7.9 G/DL (ref 13.7–17.6)
HGB BLDA-MCNC: 8.5 G/DL (ref 12–17)
HOROWITZ INDEX BLD+IHG-RTO: 100 %
HOROWITZ INDEX BLD+IHG-RTO: 40 %
IGA SERPL-MCNC: 154 MG/DL (ref 61–437)
IGG SERPL-MCNC: 1150 MG/DL (ref 700–1600)
IGM SERPL-MCNC: 70 MG/DL (ref 15–143)
IMM GRANULOCYTES # BLD: 0 10*3/MM3 (ref 0–0.03)
IMM GRANULOCYTES # BLD: 0.01 10*3/MM3 (ref 0–0.03)
IMM GRANULOCYTES NFR BLD: 0 % (ref 0–0.5)
IMM GRANULOCYTES NFR BLD: 0.2 % (ref 0–0.5)
INR PPP: 1.35 (ref 0.9–1.1)
INR PPP: 1.39 (ref 0.9–1.1)
INR PPP: 1.4 (ref 0.8–1.2)
INTERPRETATION UR IFE-IMP: NORMAL
LYMPHOCYTES # BLD AUTO: 0.79 10*3/MM3 (ref 0.9–4.8)
LYMPHOCYTES # BLD AUTO: 1.87 10*3/MM3 (ref 0.9–4.8)
LYMPHOCYTES NFR BLD AUTO: 30.2 % (ref 19.6–45.3)
LYMPHOCYTES NFR BLD AUTO: 9.4 % (ref 19.6–45.3)
MAGNESIUM SERPL-MCNC: 2.6 MG/DL (ref 1.6–2.4)
MAGNESIUM SERPL-MCNC: 2.8 MG/DL (ref 1.6–2.4)
MCH RBC QN AUTO: 29.7 PG (ref 27–32.7)
MCH RBC QN AUTO: 29.8 PG (ref 27–32.7)
MCHC RBC AUTO-ENTMCNC: 33.3 G/DL (ref 32.6–36.4)
MCHC RBC AUTO-ENTMCNC: 33.8 G/DL (ref 32.6–36.4)
MCV RBC AUTO: 87.9 FL (ref 79.8–96.2)
MCV RBC AUTO: 89.4 FL (ref 79.8–96.2)
MODALITY: ABNORMAL
MODALITY: ABNORMAL
MONOCYTES # BLD AUTO: 0.21 10*3/MM3 (ref 0.2–1.2)
MONOCYTES # BLD AUTO: 0.32 10*3/MM3 (ref 0.2–1.2)
MONOCYTES NFR BLD AUTO: 2.5 % (ref 5–12)
MONOCYTES NFR BLD AUTO: 5.2 % (ref 5–12)
NEUTROPHILS # BLD AUTO: 3.68 10*3/MM3 (ref 1.9–8.1)
NEUTROPHILS # BLD AUTO: 7.38 10*3/MM3 (ref 1.9–8.1)
NEUTROPHILS NFR BLD AUTO: 59.2 % (ref 42.7–76)
NEUTROPHILS NFR BLD AUTO: 88 % (ref 42.7–76)
O2 A-A PPRESDIFF RESPIRATORY: 0.7 MMHG
O2 A-A PPRESDIFF RESPIRATORY: 0.7 MMHG
PCO2 BLDA: 39.6 MM HG (ref 35–45)
PCO2 BLDA: 40.9 MM HG (ref 35–45)
PCO2 BLDA: 47.5 MM HG (ref 35–45)
PEEP RESPIRATORY: 10 CM[H2O]
PEEP RESPIRATORY: 7.5 CM[H2O]
PH BLDA: 7.34 PH UNITS (ref 7.35–7.45)
PH BLDA: 7.43 PH UNITS (ref 7.35–7.6)
PH BLDA: 7.44 PH UNITS (ref 7.35–7.45)
PHOSPHATE SERPL-MCNC: 2.6 MG/DL (ref 2.5–4.5)
PHOSPHATE SERPL-MCNC: 4.1 MG/DL (ref 2.5–4.5)
PHOSPHATE SERPL-MCNC: 5 MG/DL (ref 2.5–4.5)
PLATELET # BLD AUTO: 184 10*3/MM3 (ref 140–500)
PLATELET # BLD AUTO: 184 10*3/MM3 (ref 140–500)
PMV BLD AUTO: 10.1 FL (ref 6–12)
PMV BLD AUTO: 9.8 FL (ref 6–12)
PO2 BLDA: 178 MM HG (ref 80–100)
PO2 BLDA: 478.1 MM HG (ref 80–100)
PO2 BLDA: 490 MMHG (ref 80–105)
POTASSIUM BLD-SCNC: 3.9 MMOL/L (ref 3.5–5.2)
POTASSIUM BLD-SCNC: 3.9 MMOL/L (ref 3.5–5.2)
POTASSIUM BLD-SCNC: 4.1 MMOL/L (ref 3.5–5.2)
POTASSIUM BLDA-SCNC: 4.3 MMOL/L (ref 3.5–4.9)
PROT PATTERN SERPL IFE-IMP: NORMAL
PROTHROMBIN TIME: 16.1 SECONDS (ref 12.8–15.2)
PROTHROMBIN TIME: 16.4 SECONDS (ref 11.7–14.2)
PROTHROMBIN TIME: 16.8 SECONDS (ref 11.7–14.2)
PSV: 8 CMH2O
RBC # BLD AUTO: 2.65 10*6/MM3 (ref 4.6–6)
RBC # BLD AUTO: 4.04 10*6/MM3 (ref 4.6–6)
SAO2 % BLDA: 100 % (ref 95–98)
SAO2 % BLDCOA: 100 % (ref 92–99)
SAO2 % BLDCOA: 99.5 % (ref 92–99)
SET MECH RESP RATE: 14
SET MECH RESP RATE: 16
SODIUM BLD-SCNC: 137 MMOL/L (ref 136–145)
SODIUM BLD-SCNC: 139 MMOL/L (ref 136–145)
SODIUM BLD-SCNC: 143 MMOL/L (ref 136–145)
TOTAL RATE: 14 BREATHS/MINUTE
TOTAL RATE: 16 BREATHS/MINUTE
VENTILATOR MODE: ABNORMAL
VENTILATOR MODE: AC
VT ON VENT VENT: 600 ML
VT ON VENT VENT: 639 ML
WBC NRBC COR # BLD: 6.2 10*3/MM3 (ref 4.5–10.7)
WBC NRBC COR # BLD: 8.39 10*3/MM3 (ref 4.5–10.7)

## 2018-07-23 PROCEDURE — 25010000002 FUROSEMIDE PER 20 MG

## 2018-07-23 PROCEDURE — 80069 RENAL FUNCTION PANEL: CPT | Performed by: INTERNAL MEDICINE

## 2018-07-23 PROCEDURE — 82947 ASSAY GLUCOSE BLOOD QUANT: CPT

## 2018-07-23 PROCEDURE — 85014 HEMATOCRIT: CPT

## 2018-07-23 PROCEDURE — 33405 REPLACEMENT AORTIC VALVE OPN: CPT | Performed by: PHYSICIAN ASSISTANT

## 2018-07-23 PROCEDURE — 83735 ASSAY OF MAGNESIUM: CPT | Performed by: THORACIC SURGERY (CARDIOTHORACIC VASCULAR SURGERY)

## 2018-07-23 PROCEDURE — 82803 BLOOD GASES ANY COMBINATION: CPT

## 2018-07-23 PROCEDURE — 93010 ELECTROCARDIOGRAM REPORT: CPT | Performed by: INTERNAL MEDICINE

## 2018-07-23 PROCEDURE — P9016 RBC LEUKOCYTES REDUCED: HCPCS

## 2018-07-23 PROCEDURE — B548ZZA ULTRASONOGRAPHY OF SUPERIOR VENA CAVA, GUIDANCE: ICD-10-PCS | Performed by: ANESTHESIOLOGY

## 2018-07-23 PROCEDURE — P9041 ALBUMIN (HUMAN),5%, 50ML: HCPCS | Performed by: THORACIC SURGERY (CARDIOTHORACIC VASCULAR SURGERY)

## 2018-07-23 PROCEDURE — 88305 TISSUE EXAM BY PATHOLOGIST: CPT | Performed by: THORACIC SURGERY (CARDIOTHORACIC VASCULAR SURGERY)

## 2018-07-23 PROCEDURE — 25010000002 HEPARIN (PORCINE) PER 1000 UNITS: Performed by: THORACIC SURGERY (CARDIOTHORACIC VASCULAR SURGERY)

## 2018-07-23 PROCEDURE — 93005 ELECTROCARDIOGRAM TRACING: CPT | Performed by: THORACIC SURGERY (CARDIOTHORACIC VASCULAR SURGERY)

## 2018-07-23 PROCEDURE — 25010000003 PROTAMINE SULFATE PER 10 MG: Performed by: THORACIC SURGERY (CARDIOTHORACIC VASCULAR SURGERY)

## 2018-07-23 PROCEDURE — C1713 ANCHOR/SCREW BN/BN,TIS/BN: HCPCS | Performed by: THORACIC SURGERY (CARDIOTHORACIC VASCULAR SURGERY)

## 2018-07-23 PROCEDURE — 80069 RENAL FUNCTION PANEL: CPT | Performed by: THORACIC SURGERY (CARDIOTHORACIC VASCULAR SURGERY)

## 2018-07-23 PROCEDURE — 33405 REPLACEMENT AORTIC VALVE OPN: CPT | Performed by: THORACIC SURGERY (CARDIOTHORACIC VASCULAR SURGERY)

## 2018-07-23 PROCEDURE — 25010000002 VANCOMYCIN: Performed by: THORACIC SURGERY (CARDIOTHORACIC VASCULAR SURGERY)

## 2018-07-23 PROCEDURE — 33508 ENDOSCOPIC VEIN HARVEST: CPT | Performed by: THORACIC SURGERY (CARDIOTHORACIC VASCULAR SURGERY)

## 2018-07-23 PROCEDURE — 5A1221Z PERFORMANCE OF CARDIAC OUTPUT, CONTINUOUS: ICD-10-PCS | Performed by: THORACIC SURGERY (CARDIOTHORACIC VASCULAR SURGERY)

## 2018-07-23 PROCEDURE — 25010000002 ALBUMIN HUMAN 25% PER 50 ML

## 2018-07-23 PROCEDURE — 85347 COAGULATION TIME ACTIVATED: CPT

## 2018-07-23 PROCEDURE — 02RF08Z REPLACEMENT OF AORTIC VALVE WITH ZOOPLASTIC TISSUE, OPEN APPROACH: ICD-10-PCS | Performed by: THORACIC SURGERY (CARDIOTHORACIC VASCULAR SURGERY)

## 2018-07-23 PROCEDURE — 25010000002 HEPARIN (PORCINE) PER 1000 UNITS

## 2018-07-23 PROCEDURE — P9017 PLASMA 1 DONOR FRZ W/IN 8 HR: HCPCS

## 2018-07-23 PROCEDURE — 85610 PROTHROMBIN TIME: CPT

## 2018-07-23 PROCEDURE — 94799 UNLISTED PULMONARY SVC/PX: CPT

## 2018-07-23 PROCEDURE — 25010000002 MIDAZOLAM PER 1 MG: Performed by: ANESTHESIOLOGY

## 2018-07-23 PROCEDURE — 88311 DECALCIFY TISSUE: CPT | Performed by: THORACIC SURGERY (CARDIOTHORACIC VASCULAR SURGERY)

## 2018-07-23 PROCEDURE — 85018 HEMOGLOBIN: CPT

## 2018-07-23 PROCEDURE — 85610 PROTHROMBIN TIME: CPT | Performed by: THORACIC SURGERY (CARDIOTHORACIC VASCULAR SURGERY)

## 2018-07-23 PROCEDURE — C1729 CATH, DRAINAGE: HCPCS | Performed by: THORACIC SURGERY (CARDIOTHORACIC VASCULAR SURGERY)

## 2018-07-23 PROCEDURE — 25010000002 ALBUMIN HUMAN 5% PER 50 ML: Performed by: THORACIC SURGERY (CARDIOTHORACIC VASCULAR SURGERY)

## 2018-07-23 PROCEDURE — 82962 GLUCOSE BLOOD TEST: CPT

## 2018-07-23 PROCEDURE — 63710000001 INSULIN REGULAR HUMAN PER 5 UNITS: Performed by: ANESTHESIOLOGY

## 2018-07-23 PROCEDURE — 33511 CABG VEIN TWO: CPT | Performed by: PHYSICIAN ASSISTANT

## 2018-07-23 PROCEDURE — 0211093 BYPASS CORONARY ARTERY, TWO ARTERIES FROM CORONARY ARTERY WITH AUTOLOGOUS VENOUS TISSUE, OPEN APPROACH: ICD-10-PCS | Performed by: THORACIC SURGERY (CARDIOTHORACIC VASCULAR SURGERY)

## 2018-07-23 PROCEDURE — 25010000002 ONDANSETRON PER 1 MG: Performed by: ANESTHESIOLOGY

## 2018-07-23 PROCEDURE — 25010000002 AMIODARONE PER 30 MG: Performed by: ANESTHESIOLOGY

## 2018-07-23 PROCEDURE — 85025 COMPLETE CBC W/AUTO DIFF WBC: CPT | Performed by: INTERNAL MEDICINE

## 2018-07-23 PROCEDURE — 25010000002 PROPOFOL 10 MG/ML EMULSION: Performed by: ANESTHESIOLOGY

## 2018-07-23 PROCEDURE — 93318 ECHO TRANSESOPHAGEAL INTRAOP: CPT | Performed by: ANESTHESIOLOGY

## 2018-07-23 PROCEDURE — 25010000002 VANCOMYCIN PER 500 MG

## 2018-07-23 PROCEDURE — B246ZZ4 ULTRASONOGRAPHY OF RIGHT AND LEFT HEART, TRANSESOPHAGEAL: ICD-10-PCS | Performed by: THORACIC SURGERY (CARDIOTHORACIC VASCULAR SURGERY)

## 2018-07-23 PROCEDURE — P9046 ALBUMIN (HUMAN), 25%, 20 ML: HCPCS

## 2018-07-23 PROCEDURE — A4648 IMPLANTABLE TISSUE MARKER: HCPCS | Performed by: THORACIC SURGERY (CARDIOTHORACIC VASCULAR SURGERY)

## 2018-07-23 PROCEDURE — 25010000002 MAGNESIUM SULFATE PER 500 MG OF MAGNESIUM: Performed by: ANESTHESIOLOGY

## 2018-07-23 PROCEDURE — 71045 X-RAY EXAM CHEST 1 VIEW: CPT

## 2018-07-23 PROCEDURE — 25010000002 HEPARIN (PORCINE) PER 1000 UNITS: Performed by: ANESTHESIOLOGY

## 2018-07-23 PROCEDURE — 85384 FIBRINOGEN ACTIVITY: CPT | Performed by: THORACIC SURGERY (CARDIOTHORACIC VASCULAR SURGERY)

## 2018-07-23 PROCEDURE — 86927 PLASMA FRESH FROZEN: CPT

## 2018-07-23 PROCEDURE — 06BQ4ZZ EXCISION OF LEFT SAPHENOUS VEIN, PERCUTANEOUS ENDOSCOPIC APPROACH: ICD-10-PCS | Performed by: THORACIC SURGERY (CARDIOTHORACIC VASCULAR SURGERY)

## 2018-07-23 PROCEDURE — 85576 BLOOD PLATELET AGGREGATION: CPT | Performed by: NURSE PRACTITIONER

## 2018-07-23 PROCEDURE — 25010000002 MORPHINE PER 10 MG: Performed by: THORACIC SURGERY (CARDIOTHORACIC VASCULAR SURGERY)

## 2018-07-23 PROCEDURE — 33511 CABG VEIN TWO: CPT | Performed by: THORACIC SURGERY (CARDIOTHORACIC VASCULAR SURGERY)

## 2018-07-23 PROCEDURE — C1751 CATH, INF, PER/CENT/MIDLINE: HCPCS | Performed by: ANESTHESIOLOGY

## 2018-07-23 PROCEDURE — 85730 THROMBOPLASTIN TIME PARTIAL: CPT | Performed by: THORACIC SURGERY (CARDIOTHORACIC VASCULAR SURGERY)

## 2018-07-23 PROCEDURE — 02HV33Z INSERTION OF INFUSION DEVICE INTO SUPERIOR VENA CAVA, PERCUTANEOUS APPROACH: ICD-10-PCS | Performed by: ANESTHESIOLOGY

## 2018-07-23 PROCEDURE — 36430 TRANSFUSION BLD/BLD COMPNT: CPT

## 2018-07-23 PROCEDURE — 86900 BLOOD TYPING SEROLOGIC ABO: CPT

## 2018-07-23 PROCEDURE — 25010000002 PROPOFOL 1000 MG/ML EMULSION: Performed by: THORACIC SURGERY (CARDIOTHORACIC VASCULAR SURGERY)

## 2018-07-23 PROCEDURE — 82330 ASSAY OF CALCIUM: CPT | Performed by: THORACIC SURGERY (CARDIOTHORACIC VASCULAR SURGERY)

## 2018-07-23 PROCEDURE — 25010000002 METOCLOPRAMIDE PER 10 MG: Performed by: THORACIC SURGERY (CARDIOTHORACIC VASCULAR SURGERY)

## 2018-07-23 PROCEDURE — 25010000002 FENTANYL CITRATE (PF) 100 MCG/2ML SOLUTION: Performed by: ANESTHESIOLOGY

## 2018-07-23 PROCEDURE — P9035 PLATELET PHERES LEUKOREDUCED: HCPCS

## 2018-07-23 PROCEDURE — 85025 COMPLETE CBC W/AUTO DIFF WBC: CPT | Performed by: THORACIC SURGERY (CARDIOTHORACIC VASCULAR SURGERY)

## 2018-07-23 PROCEDURE — 03HY32Z INSERTION OF MONITORING DEVICE INTO UPPER ARTERY, PERCUTANEOUS APPROACH: ICD-10-PCS | Performed by: ANESTHESIOLOGY

## 2018-07-23 PROCEDURE — 25010000002 PAPAVERINE PER 60 MG: Performed by: THORACIC SURGERY (CARDIOTHORACIC VASCULAR SURGERY)

## 2018-07-23 PROCEDURE — 33508 ENDOSCOPIC VEIN HARVEST: CPT | Performed by: PHYSICIAN ASSISTANT

## 2018-07-23 DEVICE — IMPLANTABLE DEVICE: Type: IMPLANTABLE DEVICE | Site: HEART | Status: FUNCTIONAL

## 2018-07-23 DEVICE — STERN FIX SYS ZIPFIX PK/5: Type: IMPLANTABLE DEVICE | Site: STERNUM | Status: FUNCTIONAL

## 2018-07-23 RX ORDER — MEPERIDINE HYDROCHLORIDE 25 MG/ML
25 INJECTION INTRAMUSCULAR; INTRAVENOUS; SUBCUTANEOUS EVERY 4 HOURS PRN
Status: DISCONTINUED | OUTPATIENT
Start: 2018-07-23 | End: 2018-07-24

## 2018-07-23 RX ORDER — ROCURONIUM BROMIDE 10 MG/ML
INJECTION, SOLUTION INTRAVENOUS AS NEEDED
Status: DISCONTINUED | OUTPATIENT
Start: 2018-07-23 | End: 2018-07-23 | Stop reason: SURG

## 2018-07-23 RX ORDER — FUROSEMIDE 10 MG/ML
40 INJECTION INTRAMUSCULAR; INTRAVENOUS EVERY 6 HOURS PRN
Status: DISCONTINUED | OUTPATIENT
Start: 2018-07-23 | End: 2018-07-24

## 2018-07-23 RX ORDER — SODIUM CHLORIDE 9 MG/ML
75 INJECTION, SOLUTION INTRAVENOUS CONTINUOUS
Status: DISCONTINUED | OUTPATIENT
Start: 2018-07-23 | End: 2018-07-23

## 2018-07-23 RX ORDER — PROPOFOL 10 MG/ML
VIAL (ML) INTRAVENOUS CONTINUOUS PRN
Status: DISCONTINUED | OUTPATIENT
Start: 2018-07-23 | End: 2018-07-23 | Stop reason: SURG

## 2018-07-23 RX ORDER — SODIUM CHLORIDE 0.9 % (FLUSH) 0.9 %
1-10 SYRINGE (ML) INJECTION AS NEEDED
Status: DISCONTINUED | OUTPATIENT
Start: 2018-07-23 | End: 2018-07-23 | Stop reason: HOSPADM

## 2018-07-23 RX ORDER — FENTANYL CITRATE 50 UG/ML
INJECTION, SOLUTION INTRAMUSCULAR; INTRAVENOUS AS NEEDED
Status: DISCONTINUED | OUTPATIENT
Start: 2018-07-23 | End: 2018-07-23 | Stop reason: SURG

## 2018-07-23 RX ORDER — LIDOCAINE HYDROCHLORIDE 10 MG/ML
0.5 INJECTION, SOLUTION EPIDURAL; INFILTRATION; INTRACAUDAL; PERINEURAL ONCE AS NEEDED
Status: DISCONTINUED | OUTPATIENT
Start: 2018-07-23 | End: 2018-07-23 | Stop reason: HOSPADM

## 2018-07-23 RX ORDER — MAGNESIUM HYDROXIDE 1200 MG/15ML
LIQUID ORAL AS NEEDED
Status: DISCONTINUED | OUTPATIENT
Start: 2018-07-23 | End: 2018-07-23 | Stop reason: HOSPADM

## 2018-07-23 RX ORDER — PROMETHAZINE HYDROCHLORIDE 25 MG/ML
12.5 INJECTION, SOLUTION INTRAMUSCULAR; INTRAVENOUS EVERY 6 HOURS PRN
Status: DISCONTINUED | OUTPATIENT
Start: 2018-07-23 | End: 2018-08-02 | Stop reason: HOSPADM

## 2018-07-23 RX ORDER — FENTANYL CITRATE 50 UG/ML
50 INJECTION, SOLUTION INTRAMUSCULAR; INTRAVENOUS
Status: DISCONTINUED | OUTPATIENT
Start: 2018-07-23 | End: 2018-07-23 | Stop reason: HOSPADM

## 2018-07-23 RX ORDER — FAMOTIDINE 10 MG/ML
20 INJECTION, SOLUTION INTRAVENOUS ONCE
Status: COMPLETED | OUTPATIENT
Start: 2018-07-23 | End: 2018-07-23

## 2018-07-23 RX ORDER — POTASSIUM CHLORIDE 7.45 MG/ML
10 INJECTION INTRAVENOUS
Status: DISCONTINUED | OUTPATIENT
Start: 2018-07-23 | End: 2018-08-02 | Stop reason: HOSPADM

## 2018-07-23 RX ORDER — MAGNESIUM SULFATE 1 G/100ML
1 INJECTION INTRAVENOUS AS NEEDED
Status: DISCONTINUED | OUTPATIENT
Start: 2018-07-23 | End: 2018-08-02 | Stop reason: HOSPADM

## 2018-07-23 RX ORDER — POTASSIUM CHLORIDE 750 MG/1
40 CAPSULE, EXTENDED RELEASE ORAL AS NEEDED
Status: DISCONTINUED | OUTPATIENT
Start: 2018-07-23 | End: 2018-08-02 | Stop reason: HOSPADM

## 2018-07-23 RX ORDER — POTASSIUM CHLORIDE 29.8 MG/ML
20 INJECTION INTRAVENOUS
Status: DISCONTINUED | OUTPATIENT
Start: 2018-07-23 | End: 2018-07-24

## 2018-07-23 RX ORDER — OXYCODONE HYDROCHLORIDE 5 MG/1
10 TABLET ORAL EVERY 4 HOURS PRN
Status: DISCONTINUED | OUTPATIENT
Start: 2018-07-23 | End: 2018-08-02 | Stop reason: HOSPADM

## 2018-07-23 RX ORDER — SENNA AND DOCUSATE SODIUM 50; 8.6 MG/1; MG/1
2 TABLET, FILM COATED ORAL NIGHTLY
Status: DISCONTINUED | OUTPATIENT
Start: 2018-07-24 | End: 2018-08-02 | Stop reason: HOSPADM

## 2018-07-23 RX ORDER — MIDAZOLAM HYDROCHLORIDE 1 MG/ML
2 INJECTION INTRAMUSCULAR; INTRAVENOUS
Status: DISCONTINUED | OUTPATIENT
Start: 2018-07-23 | End: 2018-07-24

## 2018-07-23 RX ORDER — PROTAMINE SULFATE 10 MG/ML
INJECTION, SOLUTION INTRAVENOUS AS NEEDED
Status: DISCONTINUED | OUTPATIENT
Start: 2018-07-23 | End: 2018-07-23 | Stop reason: HOSPADM

## 2018-07-23 RX ORDER — ALPRAZOLAM 0.25 MG/1
0.25 TABLET ORAL EVERY 8 HOURS PRN
Status: DISCONTINUED | OUTPATIENT
Start: 2018-07-23 | End: 2018-08-02 | Stop reason: HOSPADM

## 2018-07-23 RX ORDER — MAGNESIUM SULFATE HEPTAHYDRATE 40 MG/ML
2 INJECTION, SOLUTION INTRAVENOUS AS NEEDED
Status: DISCONTINUED | OUTPATIENT
Start: 2018-07-23 | End: 2018-08-02 | Stop reason: HOSPADM

## 2018-07-23 RX ORDER — SODIUM CHLORIDE 9 MG/ML
30 INJECTION, SOLUTION INTRAVENOUS CONTINUOUS PRN
Status: DISCONTINUED | OUTPATIENT
Start: 2018-07-23 | End: 2018-08-02 | Stop reason: HOSPADM

## 2018-07-23 RX ORDER — MAGNESIUM SULFATE 1 G/100ML
1 INJECTION INTRAVENOUS EVERY 8 HOURS
Status: DISCONTINUED | OUTPATIENT
Start: 2018-07-23 | End: 2018-07-24

## 2018-07-23 RX ORDER — METOCLOPRAMIDE HYDROCHLORIDE 5 MG/ML
10 INJECTION INTRAMUSCULAR; INTRAVENOUS EVERY 6 HOURS
Status: DISCONTINUED | OUTPATIENT
Start: 2018-07-23 | End: 2018-07-24

## 2018-07-23 RX ORDER — HYDROCODONE BITARTRATE AND ACETAMINOPHEN 5; 325 MG/1; MG/1
2 TABLET ORAL EVERY 4 HOURS PRN
Status: DISCONTINUED | OUTPATIENT
Start: 2018-07-23 | End: 2018-08-02 | Stop reason: HOSPADM

## 2018-07-23 RX ORDER — MAGNESIUM SULFATE HEPTAHYDRATE 500 MG/ML
INJECTION, SOLUTION INTRAMUSCULAR; INTRAVENOUS AS NEEDED
Status: DISCONTINUED | OUTPATIENT
Start: 2018-07-23 | End: 2018-07-23 | Stop reason: SURG

## 2018-07-23 RX ORDER — MIDAZOLAM HYDROCHLORIDE 1 MG/ML
1 INJECTION INTRAMUSCULAR; INTRAVENOUS
Status: DISCONTINUED | OUTPATIENT
Start: 2018-07-23 | End: 2018-07-23 | Stop reason: HOSPADM

## 2018-07-23 RX ORDER — MILRINONE LACTATE 0.2 MG/ML
.25-.75 INJECTION, SOLUTION INTRAVENOUS CONTINUOUS PRN
Status: DISCONTINUED | OUTPATIENT
Start: 2018-07-23 | End: 2018-07-24

## 2018-07-23 RX ORDER — PAPAVERINE HYDROCHLORIDE 30 MG/ML
INJECTION INTRAMUSCULAR; INTRAVENOUS AS NEEDED
Status: DISCONTINUED | OUTPATIENT
Start: 2018-07-23 | End: 2018-07-23 | Stop reason: HOSPADM

## 2018-07-23 RX ORDER — MORPHINE SULFATE 2 MG/ML
4 INJECTION, SOLUTION INTRAMUSCULAR; INTRAVENOUS
Status: DISCONTINUED | OUTPATIENT
Start: 2018-07-23 | End: 2018-07-24

## 2018-07-23 RX ORDER — MIDAZOLAM HYDROCHLORIDE 1 MG/ML
2 INJECTION INTRAMUSCULAR; INTRAVENOUS
Status: DISCONTINUED | OUTPATIENT
Start: 2018-07-23 | End: 2018-07-23 | Stop reason: HOSPADM

## 2018-07-23 RX ORDER — SODIUM CHLORIDE 9 MG/ML
30 INJECTION, SOLUTION INTRAVENOUS CONTINUOUS
Status: DISCONTINUED | OUTPATIENT
Start: 2018-07-23 | End: 2018-07-24

## 2018-07-23 RX ORDER — AMIODARONE HYDROCHLORIDE 50 MG/ML
INJECTION, SOLUTION INTRAVENOUS AS NEEDED
Status: DISCONTINUED | OUTPATIENT
Start: 2018-07-23 | End: 2018-07-23 | Stop reason: SURG

## 2018-07-23 RX ORDER — BISACODYL 10 MG
10 SUPPOSITORY, RECTAL RECTAL DAILY PRN
Status: DISCONTINUED | OUTPATIENT
Start: 2018-07-24 | End: 2018-08-02 | Stop reason: HOSPADM

## 2018-07-23 RX ORDER — DOPAMINE HYDROCHLORIDE 160 MG/100ML
2-20 INJECTION, SOLUTION INTRAVENOUS CONTINUOUS PRN
Status: DISCONTINUED | OUTPATIENT
Start: 2018-07-23 | End: 2018-07-24

## 2018-07-23 RX ORDER — SODIUM CHLORIDE 9 MG/ML
INJECTION, SOLUTION INTRAVENOUS CONTINUOUS PRN
Status: DISCONTINUED | OUTPATIENT
Start: 2018-07-23 | End: 2018-07-23 | Stop reason: SURG

## 2018-07-23 RX ORDER — ACETAMINOPHEN 650 MG/1
650 SUPPOSITORY RECTAL EVERY 4 HOURS PRN
Status: DISCONTINUED | OUTPATIENT
Start: 2018-07-23 | End: 2018-07-24

## 2018-07-23 RX ORDER — PANTOPRAZOLE SODIUM 40 MG/1
40 TABLET, DELAYED RELEASE ORAL DAILY
Status: DISCONTINUED | OUTPATIENT
Start: 2018-07-24 | End: 2018-08-02 | Stop reason: HOSPADM

## 2018-07-23 RX ORDER — ATORVASTATIN CALCIUM 20 MG/1
40 TABLET, FILM COATED ORAL NIGHTLY
Status: DISCONTINUED | OUTPATIENT
Start: 2018-07-23 | End: 2018-08-02 | Stop reason: HOSPADM

## 2018-07-23 RX ORDER — MORPHINE SULFATE 2 MG/ML
1 INJECTION, SOLUTION INTRAMUSCULAR; INTRAVENOUS EVERY 4 HOURS PRN
Status: DISCONTINUED | OUTPATIENT
Start: 2018-07-23 | End: 2018-08-02 | Stop reason: HOSPADM

## 2018-07-23 RX ORDER — KETAMINE HYDROCHLORIDE 10 MG/ML
INJECTION INTRAMUSCULAR; INTRAVENOUS AS NEEDED
Status: DISCONTINUED | OUTPATIENT
Start: 2018-07-23 | End: 2018-07-23 | Stop reason: SURG

## 2018-07-23 RX ORDER — ALBUMIN, HUMAN INJ 5% 5 %
1500 SOLUTION INTRAVENOUS AS NEEDED
Status: DISCONTINUED | OUTPATIENT
Start: 2018-07-23 | End: 2018-07-24

## 2018-07-23 RX ORDER — PROPOFOL 10 MG/ML
VIAL (ML) INTRAVENOUS AS NEEDED
Status: DISCONTINUED | OUTPATIENT
Start: 2018-07-23 | End: 2018-07-23 | Stop reason: SURG

## 2018-07-23 RX ORDER — NITROGLYCERIN 20 MG/100ML
5-200 INJECTION INTRAVENOUS
Status: DISCONTINUED | OUTPATIENT
Start: 2018-07-23 | End: 2018-07-24

## 2018-07-23 RX ORDER — ONDANSETRON 2 MG/ML
INJECTION INTRAMUSCULAR; INTRAVENOUS AS NEEDED
Status: DISCONTINUED | OUTPATIENT
Start: 2018-07-23 | End: 2018-07-23 | Stop reason: SURG

## 2018-07-23 RX ORDER — TRANEXAMIC ACID 100 MG/ML
INJECTION, SOLUTION INTRAVENOUS AS NEEDED
Status: DISCONTINUED | OUTPATIENT
Start: 2018-07-23 | End: 2018-07-23 | Stop reason: SURG

## 2018-07-23 RX ORDER — PROMETHAZINE HYDROCHLORIDE 25 MG/1
12.5 TABLET ORAL EVERY 6 HOURS PRN
Status: DISCONTINUED | OUTPATIENT
Start: 2018-07-23 | End: 2018-08-02 | Stop reason: HOSPADM

## 2018-07-23 RX ORDER — SODIUM CHLORIDE 0.9 % (FLUSH) 0.9 %
30 SYRINGE (ML) INJECTION ONCE AS NEEDED
Status: DISCONTINUED | OUTPATIENT
Start: 2018-07-23 | End: 2018-08-02 | Stop reason: HOSPADM

## 2018-07-23 RX ORDER — ASPIRIN 81 MG/1
81 TABLET ORAL DAILY
Status: DISCONTINUED | OUTPATIENT
Start: 2018-07-24 | End: 2018-08-02 | Stop reason: HOSPADM

## 2018-07-23 RX ORDER — CHLORHEXIDINE GLUCONATE 0.12 MG/ML
15 RINSE ORAL EVERY 12 HOURS
Status: DISCONTINUED | OUTPATIENT
Start: 2018-07-23 | End: 2018-07-28

## 2018-07-23 RX ORDER — NALOXONE HCL 0.4 MG/ML
0.4 VIAL (ML) INJECTION
Status: DISCONTINUED | OUTPATIENT
Start: 2018-07-23 | End: 2018-08-02 | Stop reason: HOSPADM

## 2018-07-23 RX ORDER — ACETAMINOPHEN 325 MG/1
650 TABLET ORAL EVERY 4 HOURS PRN
Status: DISCONTINUED | OUTPATIENT
Start: 2018-07-23 | End: 2018-08-02 | Stop reason: HOSPADM

## 2018-07-23 RX ORDER — CYCLOBENZAPRINE HCL 10 MG
10 TABLET ORAL EVERY 8 HOURS PRN
Status: DISCONTINUED | OUTPATIENT
Start: 2018-07-24 | End: 2018-08-02 | Stop reason: HOSPADM

## 2018-07-23 RX ORDER — SODIUM CHLORIDE 9 MG/ML
9 INJECTION, SOLUTION INTRAVENOUS CONTINUOUS
Status: DISCONTINUED | OUTPATIENT
Start: 2018-07-23 | End: 2018-07-23

## 2018-07-23 RX ORDER — SODIUM CHLORIDE, SODIUM LACTATE, POTASSIUM CHLORIDE, CALCIUM CHLORIDE 600; 310; 30; 20 MG/100ML; MG/100ML; MG/100ML; MG/100ML
9 INJECTION, SOLUTION INTRAVENOUS CONTINUOUS
Status: DISCONTINUED | OUTPATIENT
Start: 2018-07-23 | End: 2018-07-23

## 2018-07-23 RX ORDER — HEPARIN SODIUM 1000 [USP'U]/ML
INJECTION, SOLUTION INTRAVENOUS; SUBCUTANEOUS AS NEEDED
Status: DISCONTINUED | OUTPATIENT
Start: 2018-07-23 | End: 2018-07-23 | Stop reason: SURG

## 2018-07-23 RX ORDER — CALCIUM CHLORIDE 100 MG/ML
INJECTION INTRAVENOUS; INTRAVENTRICULAR AS NEEDED
Status: DISCONTINUED | OUTPATIENT
Start: 2018-07-23 | End: 2018-07-23 | Stop reason: SURG

## 2018-07-23 RX ORDER — BISACODYL 5 MG/1
10 TABLET, DELAYED RELEASE ORAL DAILY PRN
Status: DISCONTINUED | OUTPATIENT
Start: 2018-07-23 | End: 2018-08-02 | Stop reason: HOSPADM

## 2018-07-23 RX ORDER — POTASSIUM CHLORIDE 1.5 G/1.77G
40 POWDER, FOR SOLUTION ORAL AS NEEDED
Status: DISCONTINUED | OUTPATIENT
Start: 2018-07-23 | End: 2018-08-02 | Stop reason: HOSPADM

## 2018-07-23 RX ORDER — ONDANSETRON 2 MG/ML
4 INJECTION INTRAMUSCULAR; INTRAVENOUS EVERY 6 HOURS PRN
Status: DISCONTINUED | OUTPATIENT
Start: 2018-07-23 | End: 2018-08-01

## 2018-07-23 RX ADMIN — PROPOFOL 50 MCG/KG/MIN: 10 INJECTION, EMULSION INTRAVENOUS at 19:04

## 2018-07-23 RX ADMIN — ROCURONIUM BROMIDE 10 MG: 10 INJECTION INTRAVENOUS at 15:52

## 2018-07-23 RX ADMIN — MIDAZOLAM 1 MG: 1 INJECTION INTRAMUSCULAR; INTRAVENOUS at 10:23

## 2018-07-23 RX ADMIN — MIDAZOLAM 2 MG: 1 INJECTION INTRAMUSCULAR; INTRAVENOUS at 10:08

## 2018-07-23 RX ADMIN — FENTANYL CITRATE 150 MCG: 50 INJECTION INTRAMUSCULAR; INTRAVENOUS at 13:44

## 2018-07-23 RX ADMIN — FENTANYL CITRATE 150 MCG: 50 INJECTION INTRAMUSCULAR; INTRAVENOUS at 17:18

## 2018-07-23 RX ADMIN — FENTANYL CITRATE 100 MCG: 50 INJECTION INTRAMUSCULAR; INTRAVENOUS at 17:16

## 2018-07-23 RX ADMIN — FENTANYL CITRATE 50 MCG: 50 INJECTION, SOLUTION INTRAMUSCULAR; INTRAVENOUS at 10:14

## 2018-07-23 RX ADMIN — CALCIUM CHLORIDE 1 G: 100 INJECTION, SOLUTION INTRAVENOUS; INTRAVENTRICULAR at 16:46

## 2018-07-23 RX ADMIN — EPHEDRINE SULFATE 10 MG: 50 INJECTION INTRAMUSCULAR; INTRAVENOUS; SUBCUTANEOUS at 13:10

## 2018-07-23 RX ADMIN — SODIUM CHLORIDE 2 UNITS/HR: 900 INJECTION, SOLUTION INTRAVENOUS at 15:16

## 2018-07-23 RX ADMIN — PROPOFOL 50 MCG/KG/MIN: 10 INJECTION, EMULSION INTRAVENOUS at 14:20

## 2018-07-23 RX ADMIN — MAGNESIUM SULFATE HEPTAHYDRATE 2 G: 500 INJECTION, SOLUTION INTRAMUSCULAR; INTRAVENOUS at 16:08

## 2018-07-23 RX ADMIN — FENTANYL CITRATE 50 MCG: 50 INJECTION, SOLUTION INTRAMUSCULAR; INTRAVENOUS at 10:08

## 2018-07-23 RX ADMIN — HEPARIN SODIUM 30000 UNITS: 1000 INJECTION, SOLUTION INTRAVENOUS; SUBCUTANEOUS at 14:08

## 2018-07-23 RX ADMIN — SODIUM CHLORIDE: 900 INJECTION, SOLUTION INTRAVENOUS at 12:50

## 2018-07-23 RX ADMIN — VANCOMYCIN HYDROCHLORIDE 2000 MG: 1 INJECTION, POWDER, LYOPHILIZED, FOR SOLUTION INTRAVENOUS at 13:10

## 2018-07-23 RX ADMIN — FAMOTIDINE 20 MG: 10 INJECTION, SOLUTION INTRAVENOUS at 10:08

## 2018-07-23 RX ADMIN — METOCLOPRAMIDE 10 MG: 5 INJECTION, SOLUTION INTRAMUSCULAR; INTRAVENOUS at 19:18

## 2018-07-23 RX ADMIN — MEPERIDINE HYDROCHLORIDE 25 MG: 25 INJECTION INTRAMUSCULAR; INTRAVENOUS; SUBCUTANEOUS at 21:33

## 2018-07-23 RX ADMIN — TRANEXAMIC ACID 400 MG: 100 INJECTION, SOLUTION INTRAVENOUS at 13:18

## 2018-07-23 RX ADMIN — AMIODARONE HYDROCHLORIDE 150 MG: 50 INJECTION, SOLUTION INTRAVENOUS at 16:27

## 2018-07-23 RX ADMIN — MIDAZOLAM 1 MG: 1 INJECTION INTRAMUSCULAR; INTRAVENOUS at 10:15

## 2018-07-23 RX ADMIN — ROCURONIUM BROMIDE 20 MG: 10 INJECTION INTRAVENOUS at 14:22

## 2018-07-23 RX ADMIN — SODIUM CHLORIDE 75 ML/HR: 9 INJECTION, SOLUTION INTRAVENOUS at 08:27

## 2018-07-23 RX ADMIN — SODIUM CHLORIDE 30 ML/HR: 9 INJECTION, SOLUTION INTRAVENOUS at 19:46

## 2018-07-23 RX ADMIN — SODIUM CHLORIDE: 9 INJECTION, SOLUTION INTRAVENOUS at 12:50

## 2018-07-23 RX ADMIN — TRANEXAMIC ACID 300 MG: 100 INJECTION, SOLUTION INTRAVENOUS at 13:12

## 2018-07-23 RX ADMIN — FENTANYL CITRATE 350 MCG: 50 INJECTION INTRAMUSCULAR; INTRAVENOUS at 13:38

## 2018-07-23 RX ADMIN — ALBUMIN HUMAN 500 ML: 0.05 INJECTION, SOLUTION INTRAVENOUS at 19:00

## 2018-07-23 RX ADMIN — MIDAZOLAM 1 MG: 1 INJECTION INTRAMUSCULAR; INTRAVENOUS at 10:33

## 2018-07-23 RX ADMIN — ONDANSETRON 4 MG: 2 INJECTION INTRAMUSCULAR; INTRAVENOUS at 17:10

## 2018-07-23 RX ADMIN — SODIUM CHLORIDE 2 MG/HR: 9 INJECTION, SOLUTION INTRAVENOUS at 14:34

## 2018-07-23 RX ADMIN — FENTANYL CITRATE 50 MCG: 50 INJECTION INTRAMUSCULAR; INTRAVENOUS at 12:59

## 2018-07-23 RX ADMIN — MUPIROCIN 1 APPLICATION: 20 OINTMENT TOPICAL at 08:31

## 2018-07-23 RX ADMIN — AMLODIPINE BESYLATE 5 MG: 5 TABLET ORAL at 08:31

## 2018-07-23 RX ADMIN — FENTANYL CITRATE 100 MCG: 50 INJECTION INTRAMUSCULAR; INTRAVENOUS at 13:04

## 2018-07-23 RX ADMIN — FENTANYL CITRATE 250 MCG: 50 INJECTION INTRAMUSCULAR; INTRAVENOUS at 14:29

## 2018-07-23 RX ADMIN — PROPOFOL 100 MG: 10 INJECTION, EMULSION INTRAVENOUS at 13:04

## 2018-07-23 RX ADMIN — ALBUMIN HUMAN 250 ML: 0.05 INJECTION, SOLUTION INTRAVENOUS at 22:24

## 2018-07-23 RX ADMIN — FENTANYL CITRATE 100 MCG: 50 INJECTION INTRAMUSCULAR; INTRAVENOUS at 13:41

## 2018-07-23 RX ADMIN — Medication 0.4 MCG/KG/HR: at 21:22

## 2018-07-23 RX ADMIN — MORPHINE SULFATE 1 MG: 2 INJECTION, SOLUTION INTRAMUSCULAR; INTRAVENOUS at 21:51

## 2018-07-23 RX ADMIN — ROCURONIUM BROMIDE 20 MG: 10 INJECTION INTRAVENOUS at 13:37

## 2018-07-23 RX ADMIN — SODIUM CHLORIDE 9 ML/HR: 9 INJECTION, SOLUTION INTRAVENOUS at 10:05

## 2018-07-23 RX ADMIN — ROCURONIUM BROMIDE 50 MG: 10 INJECTION INTRAVENOUS at 13:04

## 2018-07-23 RX ADMIN — KETAMINE HYDROCHLORIDE 50 MG: 10 INJECTION INTRAMUSCULAR; INTRAVENOUS at 13:04

## 2018-07-23 RX ADMIN — CHLORHEXIDINE GLUCONATE 15 ML: 1.2 RINSE ORAL at 08:31

## 2018-07-23 RX ADMIN — TRANEXAMIC ACID 300 MG: 100 INJECTION, SOLUTION INTRAVENOUS at 13:34

## 2018-07-23 RX ADMIN — MUPIROCIN 10 APPLICATION: 20 OINTMENT TOPICAL at 22:08

## 2018-07-23 NOTE — ANESTHESIA PROCEDURE NOTES
Procedure Performed: Emergent/Open-Heart Anesthesia ALEX     Start Time:        End Time:      Preanesthesia Checklist:  Procedure being performed at surgeon's request.    General Procedure Information  Diagnostic Indications for Echo:  assessment of surgical repair and hemodynamic monitoring  Physician Requesting Echo: NAEEM MACK  CPT Code:  Aortic stenosis, diastolic dysfunction  Location performed:  OR  Intubated  Heart visualized  Probe Insertion:  Easy  Probe Type:  Multiplane  Modalities:  Color flow mapping, continuous wave Doppler, contrast and pulse wave Doppler    Echocardiographic and Doppler Measurements    Ventricles    Right Ventricle:  Cavity size normal.  Hypertrophy not present.  Global function normal.    Left Ventricle:  Cavity size normal.  Hypertrophy present.  Global Function normal.  Ejection Fraction 70%.      Ventricular Regional Function:  1- Basal Anteroseptal:  normal  2- Basal Anterior:  normal  3- Basal Anterolateral:  normal  4- Basal Inferolateral:  normal  5- Basal Inferior:  normal  6- Basal Inferoseptal:  normal  7- Mid Anteroseptal:  normal  8- Mid Anterior:  normal  9- Mid Anterolateral:  normal  10- Mid Inferolateral:  normal  11- Mid Inferior:  normal  12- Mid Inferoseptal:  normal  13- Apical Anterior:  normal  14- Apical Lateral:  normal  15- Apical Inferior:  normal  16- Apical Septal:  normal  17- [place]:  normal      Valves    Aortic Valve:  Annulus calcified.  Stenosis moderate.  Area: 1.8 cm².  Mean Gradient: 33 mmHg.  Regurgitation absent.  Leaflets calcified.  Leaflet motions restricted.      Mitral Valve:  Annulus calcified.  Stenosis not present.  Area: 2.29 cm².  Mean Gradient: 1 mmHg.  Regurgitation trace.  Leaflets calcified.  Leaflet motions normal.      Tricuspid Valve:  Annulus normal.  Stenosis not present.  Regurgitation absent.  Leaflets normal.  Leaflet motions normal.    Pulmonic Valve:  Annulus normal.  Regurgitation absent.           Aorta    Ascending Aorta:  Size normal.  Diameter 3.5 cm.  Dissection not present.  Plaque thickness less than 3 mm.  Mobile plaque not present.          Atria    Right Atrium:  Size normal.    Left Atrium:  Size normal.  Left atrial appendage normal.      Septa    Atrial Septum:  Intra-atrial septal morphology normal.      Ventricular Septum:  Intra-ventricular septum morphology normal.      Diastolic Function Measurements:  Diastolic Dysfunction Grade= I  E= ms  A= ms  E/A Ratio=   DT= ms  S/D=  IVRT=    Other Findings  Pulmonary Arteries:  normal      Anesthesia Information  Performed Personally  Anesthesiologist:  HUGH SALMON      Echocardiogram Comments:       Post bypass  Bioprosthetic aortic valve, mean gradient 8 mmHg, trace AI  LV function unchanged.

## 2018-07-23 NOTE — ANESTHESIA PROCEDURE NOTES
Airway  Airway not difficult    General Information and Staff    Anesthesiologist: HUGH SALMON    Indications and Patient Condition    Preoxygenated: yes  Mask difficulty assessment: 1 - vent by mask    Final Airway Details  Final airway type: endotracheal airway      Successful airway: ETT  Cuffed: yes   Successful intubation technique: direct laryngoscopy  Endotracheal tube insertion site: oral  Blade: Brenner  Blade size: #2  ETT size: 8.0 mm  Cormack-Lehane Classification: grade I - full view of glottis  Placement verified by: chest auscultation and capnometry   Measured from: teeth  ETT to teeth (cm): 25    Additional Comments  Teeth checked after intubation, no damage noted.

## 2018-07-23 NOTE — ANESTHESIA PROCEDURE NOTES
Central Line    Patient location during procedure: holding area  Indications: central pressure monitoring and vascular access  Staff  Anesthesiologist: BRANDAN DONG  Preanesthetic Checklist  Completed: patient identified, site marked, surgical consent, pre-op evaluation, timeout performed, IV checked, risks and benefits discussed and monitors and equipment checked  Central Line Prep  Sterile Tech:cap, gloves, gown, mask and sterile barriers  Prep: chloraprep  Patient monitoring: blood pressure monitoring, continuous pulse oximetry and EKG  Central Line Procedure  Laterality:right  Location:internal jugular  Catheter Type:Cordis, MAC and double lumen  Catheter Size:9 Fr  Guidance:ultrasound guided  PROCEDURE NOTE/ULTRASOUND INTERPRETATION.  Using ultrasound guidance the potential vascular sites for insertion of the catheter were visualized to determine the patency of the vessel to be used for vascular access.  After selecting the appropriate site for insertion, the needle was visualized under ultrasound being inserted into the internal jugular vein, followed by ultrasound confirmation of wire and catheter placement. There were no abnormalities seen on ultrasound; an image was taken; and the patient tolerated the procedure with no complications.   Assessment  Post procedure:biopatch applied, line sutured, occlusive dressing applied and secured with tape  Assessement:blood return through all ports, free fluid flow and chest x-ray ordered  Complications:no  Patient Tolerance:patient tolerated the procedure well with no apparent complications  Additional Notes  Ultrasound interpretation note:  Under ultrasound guidance, the available vessels were evaluated and the selected vessel deemed patent.  The needle, wire and catheter were seen in the vessel.  An image was taken.  No abnormalities noted.

## 2018-07-23 NOTE — ANESTHESIA PROCEDURE NOTES
Arterial Line    Patient location during procedure: holding area   Line placed for hemodynamic monitoring.  Performed By   Anesthesiologist: BRANDAN DONG  Preanesthetic Checklist  Completed: patient identified, site marked, surgical consent, pre-op evaluation, timeout performed, IV checked, risks and benefits discussed and monitors and equipment checked  Arterial Line Prep   Sterile Tech: gloves  Prep: ChloraPrep  Patient monitoring: blood pressure monitoring, continuous pulse oximetry and EKG  Arterial Line Procedure   Laterality:right  Location:  radial artery  Catheter size: 20 G   Guidance: ultrasound guided  PROCEDURE NOTE/ULTRASOUND INTERPRETATION.  Using ultrasound guidance the potential vascular sites for insertion of the catheter were visualized to determine the patency of the vessel to be used for vascular access.  After selecting the appropriate site for insertion, the needle was visualized under ultrasound being inserted into the radial artery, followed by ultrasound confirmation of wire and catheter placement. There were no abnormalities seen on ultrasound; an image was taken; and the patient tolerated the procedure with no complications.   Number of attempts: 1  Successful placement: yes          Post Assessment   Dressing Type: occlusive dressing applied, secured with tape and wrist guard applied.   Complications no  Circ/Move/Sens Assessment: normal and unchanged.   Patient Tolerance: patient tolerated the procedure well with no apparent complications  Additional Notes  Ultrasound interpretation note:  Permanent image recorded.  Under U/S guidance, vessels analyzed, vessel seen patent.  Needle, wire and catheter seen entering vessel.  No abnormalities noted.

## 2018-07-23 NOTE — ANESTHESIA PREPROCEDURE EVALUATION
Anesthesia Evaluation     NPO Solid Status: > 8 hours  NPO Liquid Status: > 2 hours           Airway   Mallampati: II  TM distance: >3 FB  Neck ROM: full  no difficulty expected  Dental - normal exam     Pulmonary - normal exam    breath sounds clear to auscultation  (+) sleep apnea,   (-) decreased breath sounds, wheezes  Cardiovascular - normal exam    Rhythm: regular  Rate: normal    (+) hypertension, CAD, hyperlipidemia,       Neuro/Psych  GI/Hepatic/Renal/Endo    (+) obesity,   diabetes mellitus,     Musculoskeletal     Abdominal  - normal exam   Substance History      OB/GYN          Other        ROS/Med Hx Other: · Left atrial cavity size is moderately dilated.  · Mild mitral valve regurgitation is present  · Mild tricuspid valve regurgitation is present.  · Calculated EF = 68%.  · There is no evidence of pericardial effusion.  · There is calcification of the aortic valve.  · Mild to moderate aortic valve stenosis is present                  Anesthesia Plan    ASA 4     general   (A-line  Central line)  intravenous induction   Anesthetic plan and risks discussed with patient.

## 2018-07-24 ENCOUNTER — APPOINTMENT (OUTPATIENT)
Dept: GENERAL RADIOLOGY | Facility: HOSPITAL | Age: 73
End: 2018-07-24

## 2018-07-24 LAB
ABO + RH BLD: NORMAL
ACT BLD: 114 SECONDS (ref 82–152)
ALBUMIN SERPL-MCNC: 4.1 G/DL (ref 3.5–5.2)
ALBUMIN SERPL-MCNC: 4.5 G/DL (ref 3.5–5.2)
ANION GAP SERPL CALCULATED.3IONS-SCNC: 14.5 MMOL/L
ANION GAP SERPL CALCULATED.3IONS-SCNC: 15.5 MMOL/L
APTT PPP: 32.4 SECONDS (ref 22.7–35.4)
BASE EXCESS BLDA CALC-SCNC: 1 MMOL/L (ref -5–5)
BASE EXCESS BLDA CALC-SCNC: 1 MMOL/L (ref -5–5)
BASE EXCESS BLDA CALC-SCNC: 2 MMOL/L (ref -5–5)
BASE EXCESS BLDA CALC-SCNC: 3 MMOL/L (ref -5–5)
BASE EXCESS BLDA CALC-SCNC: 4 MMOL/L (ref -5–5)
BASE EXCESS BLDA CALC-SCNC: 5 MMOL/L (ref -5–5)
BASOPHILS # BLD AUTO: 0 10*3/MM3 (ref 0–0.2)
BASOPHILS NFR BLD AUTO: 0 % (ref 0–1.5)
BH BB BLOOD EXPIRATION DATE: NORMAL
BH BB BLOOD TYPE BARCODE: 5100
BH BB BLOOD TYPE BARCODE: 5100
BH BB BLOOD TYPE BARCODE: 6200
BH BB BLOOD TYPE BARCODE: 6200
BH BB DISPENSE STATUS: NORMAL
BH BB PRODUCT CODE: NORMAL
BH BB UNIT NUMBER: NORMAL
BUN BLD-MCNC: 36 MG/DL (ref 8–23)
BUN BLD-MCNC: 36 MG/DL (ref 8–23)
BUN/CREAT SERPL: 16.2 (ref 7–25)
BUN/CREAT SERPL: 17.6 (ref 7–25)
CALCIUM SPEC-SCNC: 8.4 MG/DL (ref 8.6–10.5)
CALCIUM SPEC-SCNC: 8.8 MG/DL (ref 8.6–10.5)
CHLORIDE SERPL-SCNC: 99 MMOL/L (ref 98–107)
CHLORIDE SERPL-SCNC: 99 MMOL/L (ref 98–107)
CO2 BLDA-SCNC: 27 MMOL/L (ref 24–29)
CO2 BLDA-SCNC: 29 MMOL/L (ref 24–29)
CO2 BLDA-SCNC: 29 MMOL/L (ref 24–29)
CO2 BLDA-SCNC: 31 MMOL/L (ref 24–29)
CO2 BLDA-SCNC: 31 MMOL/L (ref 24–29)
CO2 BLDA-SCNC: 32 MMOL/L (ref 24–29)
CO2 SERPL-SCNC: 24.5 MMOL/L (ref 22–29)
CO2 SERPL-SCNC: 27.5 MMOL/L (ref 22–29)
CREAT BLD-MCNC: 2.04 MG/DL (ref 0.76–1.27)
CREAT BLD-MCNC: 2.22 MG/DL (ref 0.76–1.27)
DEPRECATED RDW RBC AUTO: 46.9 FL (ref 37–54)
DEPRECATED RDW RBC AUTO: 48.6 FL (ref 37–54)
EOSINOPHIL # BLD AUTO: 0.03 10*3/MM3 (ref 0–0.7)
EOSINOPHIL NFR BLD AUTO: 0.4 % (ref 0.3–6.2)
ERYTHROCYTE [DISTWIDTH] IN BLOOD BY AUTOMATED COUNT: 14.3 % (ref 11.5–14.5)
ERYTHROCYTE [DISTWIDTH] IN BLOOD BY AUTOMATED COUNT: 14.8 % (ref 11.5–14.5)
GFR SERPL CREATININE-BSD FRML MDRD: 29 ML/MIN/1.73
GFR SERPL CREATININE-BSD FRML MDRD: 32 ML/MIN/1.73
GLUCOSE BLD-MCNC: 166 MG/DL (ref 65–99)
GLUCOSE BLD-MCNC: 179 MG/DL (ref 65–99)
GLUCOSE BLDC GLUCOMTR-MCNC: 120 MG/DL (ref 70–130)
GLUCOSE BLDC GLUCOMTR-MCNC: 123 MG/DL (ref 70–130)
GLUCOSE BLDC GLUCOMTR-MCNC: 131 MG/DL (ref 70–130)
GLUCOSE BLDC GLUCOMTR-MCNC: 133 MG/DL (ref 70–130)
GLUCOSE BLDC GLUCOMTR-MCNC: 148 MG/DL (ref 70–130)
GLUCOSE BLDC GLUCOMTR-MCNC: 152 MG/DL (ref 70–130)
GLUCOSE BLDC GLUCOMTR-MCNC: 161 MG/DL (ref 70–130)
GLUCOSE BLDC GLUCOMTR-MCNC: 162 MG/DL (ref 70–130)
GLUCOSE BLDC GLUCOMTR-MCNC: 162 MG/DL (ref 70–130)
GLUCOSE BLDC GLUCOMTR-MCNC: 163 MG/DL (ref 70–130)
GLUCOSE BLDC GLUCOMTR-MCNC: 168 MG/DL (ref 70–130)
GLUCOSE BLDC GLUCOMTR-MCNC: 180 MG/DL (ref 70–130)
GLUCOSE BLDC GLUCOMTR-MCNC: 186 MG/DL (ref 70–130)
HCO3 BLDA-SCNC: 25.5 MMOL/L (ref 22–26)
HCO3 BLDA-SCNC: 27.1 MMOL/L (ref 22–26)
HCO3 BLDA-SCNC: 27.7 MMOL/L (ref 22–26)
HCO3 BLDA-SCNC: 29.2 MMOL/L (ref 22–26)
HCO3 BLDA-SCNC: 29.7 MMOL/L (ref 22–26)
HCO3 BLDA-SCNC: 30 MMOL/L (ref 22–26)
HCT VFR BLD AUTO: 21.8 % (ref 40.4–52.2)
HCT VFR BLD AUTO: 23.7 % (ref 40.4–52.2)
HCT VFR BLDA CALC: 26 % (ref 38–51)
HCT VFR BLDA CALC: 27 % (ref 38–51)
HCT VFR BLDA CALC: 29 % (ref 38–51)
HGB BLD-MCNC: 7.3 G/DL (ref 13.7–17.6)
HGB BLD-MCNC: 7.9 G/DL (ref 13.7–17.6)
HGB BLDA-MCNC: 8.8 G/DL (ref 12–17)
HGB BLDA-MCNC: 9.2 G/DL (ref 12–17)
HGB BLDA-MCNC: 9.9 G/DL (ref 12–17)
IMM GRANULOCYTES # BLD: 0 10*3/MM3 (ref 0–0.03)
IMM GRANULOCYTES NFR BLD: 0 % (ref 0–0.5)
INR PPP: 1.28 (ref 0.9–1.1)
LYMPHOCYTES # BLD AUTO: 0.27 10*3/MM3 (ref 0.9–4.8)
LYMPHOCYTES NFR BLD AUTO: 3.6 % (ref 19.6–45.3)
MCH RBC QN AUTO: 29.9 PG (ref 27–32.7)
MCH RBC QN AUTO: 30 PG (ref 27–32.7)
MCHC RBC AUTO-ENTMCNC: 33.3 G/DL (ref 32.6–36.4)
MCHC RBC AUTO-ENTMCNC: 33.5 G/DL (ref 32.6–36.4)
MCV RBC AUTO: 89.7 FL (ref 79.8–96.2)
MCV RBC AUTO: 89.8 FL (ref 79.8–96.2)
MONOCYTES # BLD AUTO: 0.34 10*3/MM3 (ref 0.2–1.2)
MONOCYTES NFR BLD AUTO: 4.6 % (ref 5–12)
NEUTROPHILS # BLD AUTO: 6.8 10*3/MM3 (ref 1.9–8.1)
NEUTROPHILS NFR BLD AUTO: 91.4 % (ref 42.7–76)
PCO2 BLDA: 42.1 MM HG (ref 35–45)
PCO2 BLDA: 47.4 MM HG (ref 35–45)
PCO2 BLDA: 51.1 MM HG (ref 35–45)
PCO2 BLDA: 52.8 MM HG (ref 35–45)
PCO2 BLDA: 52.9 MM HG (ref 35–45)
PCO2 BLDA: 58.2 MM HG (ref 35–45)
PH BLDA: 7.31 PH UNITS (ref 7.35–7.6)
PH BLDA: 7.33 PH UNITS (ref 7.35–7.6)
PH BLDA: 7.36 PH UNITS (ref 7.35–7.6)
PH BLDA: 7.36 PH UNITS (ref 7.35–7.6)
PH BLDA: 7.37 PH UNITS (ref 7.35–7.6)
PH BLDA: 7.39 PH UNITS (ref 7.35–7.6)
PHOSPHATE SERPL-MCNC: 5.5 MG/DL (ref 2.5–4.5)
PHOSPHATE SERPL-MCNC: 5.7 MG/DL (ref 2.5–4.5)
PLATELET # BLD AUTO: 132 10*3/MM3 (ref 140–500)
PLATELET # BLD AUTO: 145 10*3/MM3 (ref 140–500)
PMV BLD AUTO: 9.7 FL (ref 6–12)
PMV BLD AUTO: 9.9 FL (ref 6–12)
PO2 BLDA: 396 MMHG (ref 80–105)
PO2 BLDA: 418 MMHG (ref 80–105)
PO2 BLDA: 459 MMHG (ref 80–105)
PO2 BLDA: 459 MMHG (ref 80–105)
PO2 BLDA: 478 MMHG (ref 80–105)
PO2 BLDA: 50 MMHG (ref 80–105)
POTASSIUM BLD-SCNC: 4.3 MMOL/L (ref 3.5–5.2)
POTASSIUM BLD-SCNC: 4.4 MMOL/L (ref 3.5–5.2)
POTASSIUM BLDA-SCNC: 3.3 MMOL/L (ref 3.5–4.9)
POTASSIUM BLDA-SCNC: 3.8 MMOL/L (ref 3.5–4.9)
POTASSIUM BLDA-SCNC: 4.3 MMOL/L (ref 3.5–4.9)
POTASSIUM BLDA-SCNC: 4.3 MMOL/L (ref 3.5–4.9)
POTASSIUM BLDA-SCNC: 4.4 MMOL/L (ref 3.5–4.9)
POTASSIUM BLDA-SCNC: 4.6 MMOL/L (ref 3.5–4.9)
PROTHROMBIN TIME: 15.8 SECONDS (ref 11.7–14.2)
RBC # BLD AUTO: 2.43 10*6/MM3 (ref 4.6–6)
RBC # BLD AUTO: 2.64 10*6/MM3 (ref 4.6–6)
SAO2 % BLDA: 100 % (ref 95–98)
SAO2 % BLDA: 82 % (ref 95–98)
SODIUM BLD-SCNC: 139 MMOL/L (ref 136–145)
SODIUM BLD-SCNC: 141 MMOL/L (ref 136–145)
UNIT  ABO: NORMAL
UNIT  RH: NORMAL
WBC NRBC COR # BLD: 7.44 10*3/MM3 (ref 4.5–10.7)
WBC NRBC COR # BLD: 8.73 10*3/MM3 (ref 4.5–10.7)

## 2018-07-24 PROCEDURE — 71045 X-RAY EXAM CHEST 1 VIEW: CPT

## 2018-07-24 PROCEDURE — 99232 SBSQ HOSP IP/OBS MODERATE 35: CPT | Performed by: INTERNAL MEDICINE

## 2018-07-24 PROCEDURE — 85730 THROMBOPLASTIN TIME PARTIAL: CPT | Performed by: THORACIC SURGERY (CARDIOTHORACIC VASCULAR SURGERY)

## 2018-07-24 PROCEDURE — 25010000002 FUROSEMIDE PER 20 MG: Performed by: INTERNAL MEDICINE

## 2018-07-24 PROCEDURE — 25010000002 ALBUMIN HUMAN 5% PER 50 ML: Performed by: THORACIC SURGERY (CARDIOTHORACIC VASCULAR SURGERY)

## 2018-07-24 PROCEDURE — 85610 PROTHROMBIN TIME: CPT | Performed by: THORACIC SURGERY (CARDIOTHORACIC VASCULAR SURGERY)

## 2018-07-24 PROCEDURE — 80069 RENAL FUNCTION PANEL: CPT | Performed by: THORACIC SURGERY (CARDIOTHORACIC VASCULAR SURGERY)

## 2018-07-24 PROCEDURE — 97162 PT EVAL MOD COMPLEX 30 MIN: CPT

## 2018-07-24 PROCEDURE — 25010000002 ENOXAPARIN PER 10 MG: Performed by: THORACIC SURGERY (CARDIOTHORACIC VASCULAR SURGERY)

## 2018-07-24 PROCEDURE — 63710000001 INSULIN ASPART PER 5 UNITS: Performed by: PHYSICIAN ASSISTANT

## 2018-07-24 PROCEDURE — 85027 COMPLETE CBC AUTOMATED: CPT | Performed by: THORACIC SURGERY (CARDIOTHORACIC VASCULAR SURGERY)

## 2018-07-24 PROCEDURE — 93010 ELECTROCARDIOGRAM REPORT: CPT | Performed by: INTERNAL MEDICINE

## 2018-07-24 PROCEDURE — 25010000002 METOCLOPRAMIDE PER 10 MG: Performed by: THORACIC SURGERY (CARDIOTHORACIC VASCULAR SURGERY)

## 2018-07-24 PROCEDURE — P9041 ALBUMIN (HUMAN),5%, 50ML: HCPCS | Performed by: THORACIC SURGERY (CARDIOTHORACIC VASCULAR SURGERY)

## 2018-07-24 PROCEDURE — 82962 GLUCOSE BLOOD TEST: CPT

## 2018-07-24 PROCEDURE — 85025 COMPLETE CBC W/AUTO DIFF WBC: CPT | Performed by: THORACIC SURGERY (CARDIOTHORACIC VASCULAR SURGERY)

## 2018-07-24 PROCEDURE — 25010000002 VANCOMYCIN 10 G RECONSTITUTED SOLUTION: Performed by: THORACIC SURGERY (CARDIOTHORACIC VASCULAR SURGERY)

## 2018-07-24 PROCEDURE — 97110 THERAPEUTIC EXERCISES: CPT

## 2018-07-24 PROCEDURE — 93005 ELECTROCARDIOGRAM TRACING: CPT | Performed by: THORACIC SURGERY (CARDIOTHORACIC VASCULAR SURGERY)

## 2018-07-24 PROCEDURE — 80069 RENAL FUNCTION PANEL: CPT | Performed by: INTERNAL MEDICINE

## 2018-07-24 RX ORDER — NICOTINE POLACRILEX 4 MG
15 LOZENGE BUCCAL
Status: DISCONTINUED | OUTPATIENT
Start: 2018-07-24 | End: 2018-08-02 | Stop reason: HOSPADM

## 2018-07-24 RX ORDER — FUROSEMIDE 10 MG/ML
40 INJECTION INTRAMUSCULAR; INTRAVENOUS EVERY 12 HOURS
Status: DISPENSED | OUTPATIENT
Start: 2018-07-24 | End: 2018-07-26

## 2018-07-24 RX ORDER — DEXTROSE MONOHYDRATE 25 G/50ML
25 INJECTION, SOLUTION INTRAVENOUS
Status: DISCONTINUED | OUTPATIENT
Start: 2018-07-24 | End: 2018-08-02 | Stop reason: HOSPADM

## 2018-07-24 RX ADMIN — OXYCODONE HYDROCHLORIDE 10 MG: 5 TABLET ORAL at 16:12

## 2018-07-24 RX ADMIN — ACETAMINOPHEN 650 MG: 325 TABLET, FILM COATED ORAL at 07:53

## 2018-07-24 RX ADMIN — METOCLOPRAMIDE 10 MG: 5 INJECTION, SOLUTION INTRAMUSCULAR; INTRAVENOUS at 06:17

## 2018-07-24 RX ADMIN — INSULIN ASPART 2 UNITS: 100 INJECTION, SOLUTION INTRAVENOUS; SUBCUTANEOUS at 12:26

## 2018-07-24 RX ADMIN — DOCUSATE SODIUM -SENNOSIDES 2 TABLET: 50; 8.6 TABLET, COATED ORAL at 21:45

## 2018-07-24 RX ADMIN — PANTOPRAZOLE SODIUM 40 MG: 40 TABLET, DELAYED RELEASE ORAL at 08:14

## 2018-07-24 RX ADMIN — MUPIROCIN 10 APPLICATION: 20 OINTMENT TOPICAL at 08:14

## 2018-07-24 RX ADMIN — ASPIRIN 81 MG: 81 TABLET, DELAYED RELEASE ORAL at 08:14

## 2018-07-24 RX ADMIN — ATORVASTATIN CALCIUM 40 MG: 20 TABLET, FILM COATED ORAL at 21:44

## 2018-07-24 RX ADMIN — METOPROLOL TARTRATE 12.5 MG: 25 TABLET ORAL at 21:45

## 2018-07-24 RX ADMIN — FUROSEMIDE 40 MG: 10 INJECTION, SOLUTION INTRAMUSCULAR; INTRAVENOUS at 11:27

## 2018-07-24 RX ADMIN — METOCLOPRAMIDE 10 MG: 5 INJECTION, SOLUTION INTRAMUSCULAR; INTRAVENOUS at 00:31

## 2018-07-24 RX ADMIN — INSULIN ASPART 2 UNITS: 100 INJECTION, SOLUTION INTRAVENOUS; SUBCUTANEOUS at 21:45

## 2018-07-24 RX ADMIN — INSULIN ASPART 2 UNITS: 100 INJECTION, SOLUTION INTRAVENOUS; SUBCUTANEOUS at 17:51

## 2018-07-24 RX ADMIN — VANCOMYCIN HYDROCHLORIDE 2000 MG: 10 INJECTION, POWDER, LYOPHILIZED, FOR SOLUTION INTRAVENOUS at 00:37

## 2018-07-24 RX ADMIN — MUPIROCIN: 20 OINTMENT TOPICAL at 23:37

## 2018-07-24 RX ADMIN — OXYCODONE HYDROCHLORIDE 10 MG: 5 TABLET ORAL at 21:45

## 2018-07-24 RX ADMIN — ACETAMINOPHEN 650 MG: 325 TABLET, FILM COATED ORAL at 12:11

## 2018-07-24 RX ADMIN — CYCLOBENZAPRINE 10 MG: 10 TABLET, FILM COATED ORAL at 22:29

## 2018-07-24 RX ADMIN — CYCLOBENZAPRINE 10 MG: 10 TABLET, FILM COATED ORAL at 11:22

## 2018-07-24 RX ADMIN — OXYCODONE HYDROCHLORIDE 10 MG: 5 TABLET ORAL at 12:11

## 2018-07-24 RX ADMIN — CHLORHEXIDINE GLUCONATE 15 ML: 1.2 RINSE ORAL at 17:52

## 2018-07-24 RX ADMIN — HYDROCODONE BITARTRATE AND ACETAMINOPHEN 2 TABLET: 5; 325 TABLET ORAL at 00:31

## 2018-07-24 RX ADMIN — ALBUMIN HUMAN 250 ML: 0.05 INJECTION, SOLUTION INTRAVENOUS at 01:31

## 2018-07-24 RX ADMIN — FUROSEMIDE 40 MG: 10 INJECTION, SOLUTION INTRAMUSCULAR; INTRAVENOUS at 23:37

## 2018-07-24 RX ADMIN — VANCOMYCIN HYDROCHLORIDE 2000 MG: 10 INJECTION, POWDER, LYOPHILIZED, FOR SOLUTION INTRAVENOUS at 13:50

## 2018-07-24 RX ADMIN — HYDROCODONE BITARTRATE AND ACETAMINOPHEN 2 TABLET: 5; 325 TABLET ORAL at 04:25

## 2018-07-24 RX ADMIN — ENOXAPARIN SODIUM 40 MG: 40 INJECTION SUBCUTANEOUS at 17:52

## 2018-07-24 RX ADMIN — ALPRAZOLAM 0.25 MG: 0.25 TABLET ORAL at 23:37

## 2018-07-24 RX ADMIN — CHLORHEXIDINE GLUCONATE 15 ML: 1.2 RINSE ORAL at 06:17

## 2018-07-24 RX ADMIN — OXYCODONE HYDROCHLORIDE 10 MG: 5 TABLET ORAL at 07:53

## 2018-07-24 RX ADMIN — METOPROLOL TARTRATE 12.5 MG: 25 TABLET ORAL at 08:14

## 2018-07-24 NOTE — ANESTHESIA POSTPROCEDURE EVALUATION
"Patient: Rock Maciel Jr.    Procedure Summary     Date:  07/23/18 Room / Location:  [facility] OR [address] [place] [address] MAIN OR    Anesthesia Start:  1250 Anesthesia Stop:  1821    Procedure:  INTRAOPERATIVE ALEX, MIDLINE STERNOTOMY, CORONARY ARTERY BYPASS GRAFTING X 3 USING ENDOSCOPICALLY HARVESTED LEFT GREATER SAPHENOUS VEIN,  AORTIC VALVE REPLACEMENT USING 25MM LOPEZ II ULTRA PORCINE VALVE, PRP (N/A Chest) Diagnosis:       Arteriosclerosis of coronary artery      Nonrheumatic aortic valve stenosis      (Arteriosclerosis of coronary artery [I25.10])      (Nonrheumatic aortic valve stenosis [I35.0])    Surgeon:  Phong Posey MD Provider:  Fredis Arango MD    Anesthesia Type:  general ASA Status:  4          Anesthesia Type: general  Last vitals  BP   115/54 (07/24/18 0805)   Temp   36.9 °C (98.4 °F) (07/23/18 0952)   Pulse   81 (07/24/18 0805)   Resp   18 (07/24/18 0600)     SpO2   100 % (07/24/18 0805)     Post Anesthesia Care and Evaluation    Patient location during evaluation: bedside  Patient participation: complete - patient participated  Level of consciousness: awake  Pain management: adequate  Airway patency: patent  Anesthetic complications: No anesthetic complications    Cardiovascular status: acceptable  Respiratory status: acceptable  Hydration status: acceptable    Comments: /54   Pulse 81   Temp 36.9 °C (98.4 °F) (Oral)   Resp 18   Ht 185.4 cm (72.99\")   Wt 136 kg (299 lb 13.2 oz)   SpO2 100%   BMI 39.57 kg/m²         "

## 2018-07-25 ENCOUNTER — APPOINTMENT (OUTPATIENT)
Dept: GENERAL RADIOLOGY | Facility: HOSPITAL | Age: 73
End: 2018-07-25

## 2018-07-25 LAB
ABO + RH BLD: NORMAL
ABO + RH BLD: NORMAL
ABO GROUP BLD: NORMAL
ALBUMIN SERPL-MCNC: 3.8 G/DL (ref 3.5–5.2)
ANION GAP SERPL CALCULATED.3IONS-SCNC: 13.7 MMOL/L
BH BB BLOOD EXPIRATION DATE: NORMAL
BH BB BLOOD EXPIRATION DATE: NORMAL
BH BB BLOOD TYPE BARCODE: 5100
BH BB BLOOD TYPE BARCODE: 5100
BH BB DISPENSE STATUS: NORMAL
BH BB DISPENSE STATUS: NORMAL
BH BB PRODUCT CODE: NORMAL
BH BB PRODUCT CODE: NORMAL
BH BB UNIT NUMBER: NORMAL
BH BB UNIT NUMBER: NORMAL
BLD GP AB SCN SERPL QL: NEGATIVE
BUN BLD-MCNC: 39 MG/DL (ref 8–23)
BUN/CREAT SERPL: 17.4 (ref 7–25)
CALCIUM SPEC-SCNC: 8.4 MG/DL (ref 8.6–10.5)
CHLORIDE SERPL-SCNC: 100 MMOL/L (ref 98–107)
CO2 SERPL-SCNC: 26.3 MMOL/L (ref 22–29)
CREAT BLD-MCNC: 2.24 MG/DL (ref 0.76–1.27)
CYTO UR: NORMAL
DEPRECATED RDW RBC AUTO: 49.6 FL (ref 37–54)
ERYTHROCYTE [DISTWIDTH] IN BLOOD BY AUTOMATED COUNT: 15 % (ref 11.5–14.5)
FERRITIN SERPL-MCNC: 63.83 NG/ML (ref 30–400)
GFR SERPL CREATININE-BSD FRML MDRD: 29 ML/MIN/1.73
GLUCOSE BLD-MCNC: 146 MG/DL (ref 65–99)
GLUCOSE BLDC GLUCOMTR-MCNC: 165 MG/DL (ref 70–130)
GLUCOSE BLDC GLUCOMTR-MCNC: 205 MG/DL (ref 70–130)
GLUCOSE BLDC GLUCOMTR-MCNC: 213 MG/DL (ref 70–130)
GLUCOSE BLDC GLUCOMTR-MCNC: 266 MG/DL (ref 70–130)
HCT VFR BLD AUTO: 20.2 % (ref 40.4–52.2)
HGB BLD-MCNC: 6.5 G/DL (ref 13.7–17.6)
IRON 24H UR-MRATE: 13 MCG/DL (ref 59–158)
IRON SATN MFR SERPL: 6 % (ref 20–50)
LAB AP CASE REPORT: NORMAL
MAGNESIUM SERPL-MCNC: 2.4 MG/DL (ref 1.6–2.4)
MCH RBC QN AUTO: 29.1 PG (ref 27–32.7)
MCHC RBC AUTO-ENTMCNC: 32.2 G/DL (ref 32.6–36.4)
MCV RBC AUTO: 90.6 FL (ref 79.8–96.2)
PATH REPORT.FINAL DX SPEC: NORMAL
PATH REPORT.GROSS SPEC: NORMAL
PHOSPHATE SERPL-MCNC: 5.1 MG/DL (ref 2.5–4.5)
PLATELET # BLD AUTO: 142 10*3/MM3 (ref 140–500)
PMV BLD AUTO: 10.6 FL (ref 6–12)
POTASSIUM BLD-SCNC: 4.4 MMOL/L (ref 3.5–5.2)
RBC # BLD AUTO: 2.23 10*6/MM3 (ref 4.6–6)
RH BLD: POSITIVE
SODIUM BLD-SCNC: 140 MMOL/L (ref 136–145)
T&S EXPIRATION DATE: NORMAL
TIBC SERPL-MCNC: 234 MCG/DL
TRANSFERRIN SERPL-MCNC: 157 MG/DL (ref 200–360)
UNIT  ABO: NORMAL
UNIT  ABO: NORMAL
UNIT  RH: NORMAL
UNIT  RH: NORMAL
WBC NRBC COR # BLD: 6.6 10*3/MM3 (ref 4.5–10.7)

## 2018-07-25 PROCEDURE — P9016 RBC LEUKOCYTES REDUCED: HCPCS

## 2018-07-25 PROCEDURE — 25010000002 VANCOMYCIN 10 G RECONSTITUTED SOLUTION: Performed by: THORACIC SURGERY (CARDIOTHORACIC VASCULAR SURGERY)

## 2018-07-25 PROCEDURE — 82962 GLUCOSE BLOOD TEST: CPT

## 2018-07-25 PROCEDURE — 86901 BLOOD TYPING SEROLOGIC RH(D): CPT | Performed by: INTERNAL MEDICINE

## 2018-07-25 PROCEDURE — 83540 ASSAY OF IRON: CPT | Performed by: INTERNAL MEDICINE

## 2018-07-25 PROCEDURE — 80069 RENAL FUNCTION PANEL: CPT | Performed by: INTERNAL MEDICINE

## 2018-07-25 PROCEDURE — 93010 ELECTROCARDIOGRAM REPORT: CPT | Performed by: INTERNAL MEDICINE

## 2018-07-25 PROCEDURE — 86923 COMPATIBILITY TEST ELECTRIC: CPT

## 2018-07-25 PROCEDURE — 82728 ASSAY OF FERRITIN: CPT | Performed by: INTERNAL MEDICINE

## 2018-07-25 PROCEDURE — 85027 COMPLETE CBC AUTOMATED: CPT | Performed by: THORACIC SURGERY (CARDIOTHORACIC VASCULAR SURGERY)

## 2018-07-25 PROCEDURE — 71045 X-RAY EXAM CHEST 1 VIEW: CPT

## 2018-07-25 PROCEDURE — 86900 BLOOD TYPING SEROLOGIC ABO: CPT | Performed by: INTERNAL MEDICINE

## 2018-07-25 PROCEDURE — 86900 BLOOD TYPING SEROLOGIC ABO: CPT

## 2018-07-25 PROCEDURE — 99232 SBSQ HOSP IP/OBS MODERATE 35: CPT | Performed by: NURSE PRACTITIONER

## 2018-07-25 PROCEDURE — 25010000002 ENOXAPARIN PER 10 MG: Performed by: THORACIC SURGERY (CARDIOTHORACIC VASCULAR SURGERY)

## 2018-07-25 PROCEDURE — 63710000001 INSULIN ASPART PER 5 UNITS: Performed by: PHYSICIAN ASSISTANT

## 2018-07-25 PROCEDURE — 84466 ASSAY OF TRANSFERRIN: CPT | Performed by: INTERNAL MEDICINE

## 2018-07-25 PROCEDURE — 93005 ELECTROCARDIOGRAM TRACING: CPT | Performed by: THORACIC SURGERY (CARDIOTHORACIC VASCULAR SURGERY)

## 2018-07-25 PROCEDURE — 86850 RBC ANTIBODY SCREEN: CPT | Performed by: INTERNAL MEDICINE

## 2018-07-25 PROCEDURE — 99024 POSTOP FOLLOW-UP VISIT: CPT | Performed by: PHYSICIAN ASSISTANT

## 2018-07-25 PROCEDURE — 97110 THERAPEUTIC EXERCISES: CPT

## 2018-07-25 PROCEDURE — 83735 ASSAY OF MAGNESIUM: CPT | Performed by: NURSE PRACTITIONER

## 2018-07-25 PROCEDURE — 36430 TRANSFUSION BLD/BLD COMPNT: CPT

## 2018-07-25 RX ORDER — METOPROLOL TARTRATE 50 MG/1
50 TABLET, FILM COATED ORAL EVERY 12 HOURS SCHEDULED
Status: DISCONTINUED | OUTPATIENT
Start: 2018-07-25 | End: 2018-07-29

## 2018-07-25 RX ADMIN — METOPROLOL TARTRATE 5 MG: 5 INJECTION, SOLUTION INTRAVENOUS at 17:02

## 2018-07-25 RX ADMIN — ASPIRIN 81 MG: 81 TABLET, DELAYED RELEASE ORAL at 08:34

## 2018-07-25 RX ADMIN — METOPROLOL TARTRATE 12.5 MG: 25 TABLET ORAL at 08:34

## 2018-07-25 RX ADMIN — OXYCODONE HYDROCHLORIDE 10 MG: 5 TABLET ORAL at 03:46

## 2018-07-25 RX ADMIN — CHLORHEXIDINE GLUCONATE 15 ML: 1.2 RINSE ORAL at 07:29

## 2018-07-25 RX ADMIN — MUPIROCIN 10 APPLICATION: 20 OINTMENT TOPICAL at 08:34

## 2018-07-25 RX ADMIN — INSULIN ASPART 6 UNITS: 100 INJECTION, SOLUTION INTRAVENOUS; SUBCUTANEOUS at 11:10

## 2018-07-25 RX ADMIN — VANCOMYCIN HYDROCHLORIDE 2000 MG: 10 INJECTION, POWDER, LYOPHILIZED, FOR SOLUTION INTRAVENOUS at 03:31

## 2018-07-25 RX ADMIN — ALPRAZOLAM 0.25 MG: 0.25 TABLET ORAL at 23:26

## 2018-07-25 RX ADMIN — MUPIROCIN 1 APPLICATION: 20 OINTMENT TOPICAL at 21:26

## 2018-07-25 RX ADMIN — ENOXAPARIN SODIUM 40 MG: 40 INJECTION SUBCUTANEOUS at 16:33

## 2018-07-25 RX ADMIN — PANTOPRAZOLE SODIUM 40 MG: 40 TABLET, DELAYED RELEASE ORAL at 07:28

## 2018-07-25 RX ADMIN — DOCUSATE SODIUM -SENNOSIDES 2 TABLET: 50; 8.6 TABLET, COATED ORAL at 21:26

## 2018-07-25 RX ADMIN — INSULIN ASPART 4 UNITS: 100 INJECTION, SOLUTION INTRAVENOUS; SUBCUTANEOUS at 16:34

## 2018-07-25 RX ADMIN — INSULIN ASPART 2 UNITS: 100 INJECTION, SOLUTION INTRAVENOUS; SUBCUTANEOUS at 07:29

## 2018-07-25 RX ADMIN — INSULIN ASPART 4 UNITS: 100 INJECTION, SOLUTION INTRAVENOUS; SUBCUTANEOUS at 21:26

## 2018-07-25 RX ADMIN — CHLORHEXIDINE GLUCONATE 15 ML: 1.2 RINSE ORAL at 16:34

## 2018-07-25 RX ADMIN — ATORVASTATIN CALCIUM 40 MG: 20 TABLET, FILM COATED ORAL at 21:26

## 2018-07-25 RX ADMIN — METOPROLOL TARTRATE 12.5 MG: 25 TABLET ORAL at 15:30

## 2018-07-25 RX ADMIN — METOPROLOL TARTRATE 50 MG: 50 TABLET, FILM COATED ORAL at 21:25

## 2018-07-26 ENCOUNTER — APPOINTMENT (OUTPATIENT)
Dept: CARDIOLOGY | Facility: HOSPITAL | Age: 73
End: 2018-07-26
Attending: INTERNAL MEDICINE

## 2018-07-26 ENCOUNTER — APPOINTMENT (OUTPATIENT)
Dept: GENERAL RADIOLOGY | Facility: HOSPITAL | Age: 73
End: 2018-07-26

## 2018-07-26 LAB
ABO + RH BLD: NORMAL
ALBUMIN SERPL-MCNC: 3.4 G/DL (ref 3.5–5.2)
ANION GAP SERPL CALCULATED.3IONS-SCNC: 11.9 MMOL/L
BH BB BLOOD EXPIRATION DATE: NORMAL
BH BB BLOOD TYPE BARCODE: 5100
BH BB DISPENSE STATUS: NORMAL
BH BB PRODUCT CODE: NORMAL
BH BB UNIT NUMBER: NORMAL
BUN BLD-MCNC: 43 MG/DL (ref 8–23)
BUN/CREAT SERPL: 22.2 (ref 7–25)
CALCIUM SPEC-SCNC: 8.6 MG/DL (ref 8.6–10.5)
CHLORIDE SERPL-SCNC: 98 MMOL/L (ref 98–107)
CO2 SERPL-SCNC: 26.1 MMOL/L (ref 22–29)
CREAT BLD-MCNC: 1.94 MG/DL (ref 0.76–1.27)
DEPRECATED RDW RBC AUTO: 48.2 FL (ref 37–54)
ERYTHROCYTE [DISTWIDTH] IN BLOOD BY AUTOMATED COUNT: 14.8 % (ref 11.5–14.5)
GFR SERPL CREATININE-BSD FRML MDRD: 34 ML/MIN/1.73
GLUCOSE BLD-MCNC: 183 MG/DL (ref 65–99)
GLUCOSE BLDC GLUCOMTR-MCNC: 186 MG/DL (ref 70–130)
GLUCOSE BLDC GLUCOMTR-MCNC: 226 MG/DL (ref 70–130)
GLUCOSE BLDC GLUCOMTR-MCNC: 234 MG/DL (ref 70–130)
GLUCOSE BLDC GLUCOMTR-MCNC: 265 MG/DL (ref 70–130)
HCT VFR BLD AUTO: 21 % (ref 40.4–52.2)
HCT VFR BLD AUTO: 29.4 % (ref 40.4–52.2)
HGB BLD-MCNC: 6.8 G/DL (ref 13.7–17.6)
HGB BLD-MCNC: 9.9 G/DL (ref 13.7–17.6)
IRON 24H UR-MRATE: 18 MCG/DL (ref 59–158)
IRON SATN MFR SERPL: 8 % (ref 20–50)
MCH RBC QN AUTO: 28.9 PG (ref 27–32.7)
MCHC RBC AUTO-ENTMCNC: 32.4 G/DL (ref 32.6–36.4)
MCV RBC AUTO: 89.4 FL (ref 79.8–96.2)
PHOSPHATE SERPL-MCNC: 3.1 MG/DL (ref 2.5–4.5)
PLATELET # BLD AUTO: 133 10*3/MM3 (ref 140–500)
PMV BLD AUTO: 10.2 FL (ref 6–12)
POTASSIUM BLD-SCNC: 3.7 MMOL/L (ref 3.5–5.2)
RBC # BLD AUTO: 2.35 10*6/MM3 (ref 4.6–6)
SODIUM BLD-SCNC: 136 MMOL/L (ref 136–145)
TIBC SERPL-MCNC: 226 MCG/DL (ref 298–536)
TRANSFERRIN SERPL-MCNC: 152 MG/DL (ref 200–360)
UNIT  ABO: NORMAL
UNIT  RH: NORMAL
WBC NRBC COR # BLD: 6.16 10*3/MM3 (ref 4.5–10.7)

## 2018-07-26 PROCEDURE — 80069 RENAL FUNCTION PANEL: CPT | Performed by: INTERNAL MEDICINE

## 2018-07-26 PROCEDURE — 36430 TRANSFUSION BLD/BLD COMPNT: CPT

## 2018-07-26 PROCEDURE — 93321 DOPPLER ECHO F-UP/LMTD STD: CPT | Performed by: INTERNAL MEDICINE

## 2018-07-26 PROCEDURE — 97110 THERAPEUTIC EXERCISES: CPT

## 2018-07-26 PROCEDURE — 93308 TTE F-UP OR LMTD: CPT

## 2018-07-26 PROCEDURE — 93308 TTE F-UP OR LMTD: CPT | Performed by: INTERNAL MEDICINE

## 2018-07-26 PROCEDURE — 85014 HEMATOCRIT: CPT | Performed by: INTERNAL MEDICINE

## 2018-07-26 PROCEDURE — 71046 X-RAY EXAM CHEST 2 VIEWS: CPT

## 2018-07-26 PROCEDURE — 99024 POSTOP FOLLOW-UP VISIT: CPT | Performed by: NURSE PRACTITIONER

## 2018-07-26 PROCEDURE — 85027 COMPLETE CBC AUTOMATED: CPT | Performed by: THORACIC SURGERY (CARDIOTHORACIC VASCULAR SURGERY)

## 2018-07-26 PROCEDURE — 93005 ELECTROCARDIOGRAM TRACING: CPT | Performed by: NURSE PRACTITIONER

## 2018-07-26 PROCEDURE — 25010000002 ENOXAPARIN PER 10 MG: Performed by: THORACIC SURGERY (CARDIOTHORACIC VASCULAR SURGERY)

## 2018-07-26 PROCEDURE — 93010 ELECTROCARDIOGRAM REPORT: CPT | Performed by: INTERNAL MEDICINE

## 2018-07-26 PROCEDURE — 82962 GLUCOSE BLOOD TEST: CPT

## 2018-07-26 PROCEDURE — 86900 BLOOD TYPING SEROLOGIC ABO: CPT

## 2018-07-26 PROCEDURE — 63710000001 INSULIN ASPART PER 5 UNITS: Performed by: PHYSICIAN ASSISTANT

## 2018-07-26 PROCEDURE — 99232 SBSQ HOSP IP/OBS MODERATE 35: CPT | Performed by: INTERNAL MEDICINE

## 2018-07-26 PROCEDURE — P9016 RBC LEUKOCYTES REDUCED: HCPCS

## 2018-07-26 PROCEDURE — 25010000002 FUROSEMIDE PER 20 MG: Performed by: INTERNAL MEDICINE

## 2018-07-26 PROCEDURE — 84466 ASSAY OF TRANSFERRIN: CPT | Performed by: NURSE PRACTITIONER

## 2018-07-26 PROCEDURE — 93321 DOPPLER ECHO F-UP/LMTD STD: CPT

## 2018-07-26 PROCEDURE — 85018 HEMOGLOBIN: CPT | Performed by: INTERNAL MEDICINE

## 2018-07-26 PROCEDURE — 83540 ASSAY OF IRON: CPT | Performed by: NURSE PRACTITIONER

## 2018-07-26 RX ORDER — IRON POLYSACCHARIDE COMPLEX 150 MG
150 CAPSULE ORAL DAILY
Status: DISCONTINUED | OUTPATIENT
Start: 2018-07-26 | End: 2018-08-02 | Stop reason: HOSPADM

## 2018-07-26 RX ORDER — TAMSULOSIN HYDROCHLORIDE 0.4 MG/1
0.4 CAPSULE ORAL DAILY
Status: DISCONTINUED | OUTPATIENT
Start: 2018-07-26 | End: 2018-08-02 | Stop reason: HOSPADM

## 2018-07-26 RX ORDER — BISACODYL 10 MG
10 SUPPOSITORY, RECTAL RECTAL ONCE
Status: COMPLETED | OUTPATIENT
Start: 2018-07-26 | End: 2018-07-26

## 2018-07-26 RX ORDER — FUROSEMIDE 10 MG/ML
40 INJECTION INTRAMUSCULAR; INTRAVENOUS EVERY 12 HOURS
Status: DISCONTINUED | OUTPATIENT
Start: 2018-07-26 | End: 2018-07-27

## 2018-07-26 RX ORDER — POTASSIUM CHLORIDE 750 MG/1
20 CAPSULE, EXTENDED RELEASE ORAL DAILY
Status: DISCONTINUED | OUTPATIENT
Start: 2018-07-26 | End: 2018-08-02 | Stop reason: HOSPADM

## 2018-07-26 RX ADMIN — METOPROLOL TARTRATE 50 MG: 50 TABLET, FILM COATED ORAL at 21:06

## 2018-07-26 RX ADMIN — INSULIN ASPART 6 UNITS: 100 INJECTION, SOLUTION INTRAVENOUS; SUBCUTANEOUS at 21:05

## 2018-07-26 RX ADMIN — ASPIRIN 81 MG: 81 TABLET, DELAYED RELEASE ORAL at 09:00

## 2018-07-26 RX ADMIN — FUROSEMIDE 40 MG: 10 INJECTION, SOLUTION INTRAMUSCULAR; INTRAVENOUS at 18:50

## 2018-07-26 RX ADMIN — METOPROLOL TARTRATE 50 MG: 50 TABLET, FILM COATED ORAL at 11:22

## 2018-07-26 RX ADMIN — PANTOPRAZOLE SODIUM 40 MG: 40 TABLET, DELAYED RELEASE ORAL at 06:05

## 2018-07-26 RX ADMIN — INSULIN ASPART 4 UNITS: 100 INJECTION, SOLUTION INTRAVENOUS; SUBCUTANEOUS at 18:51

## 2018-07-26 RX ADMIN — INSULIN ASPART 2 UNITS: 100 INJECTION, SOLUTION INTRAVENOUS; SUBCUTANEOUS at 06:51

## 2018-07-26 RX ADMIN — CHLORHEXIDINE GLUCONATE 15 ML: 1.2 RINSE ORAL at 18:57

## 2018-07-26 RX ADMIN — MUPIROCIN 10 APPLICATION: 20 OINTMENT TOPICAL at 11:22

## 2018-07-26 RX ADMIN — HYDROCODONE BITARTRATE AND ACETAMINOPHEN 2 TABLET: 5; 325 TABLET ORAL at 02:09

## 2018-07-26 RX ADMIN — TAMSULOSIN HYDROCHLORIDE 0.4 MG: 0.4 CAPSULE ORAL at 11:24

## 2018-07-26 RX ADMIN — DOCUSATE SODIUM -SENNOSIDES 2 TABLET: 50; 8.6 TABLET, COATED ORAL at 21:05

## 2018-07-26 RX ADMIN — BISACODYL 10 MG: 10 SUPPOSITORY RECTAL at 18:51

## 2018-07-26 RX ADMIN — ENOXAPARIN SODIUM 40 MG: 40 INJECTION SUBCUTANEOUS at 18:51

## 2018-07-26 RX ADMIN — POTASSIUM CHLORIDE 20 MEQ: 750 CAPSULE, EXTENDED RELEASE ORAL at 18:51

## 2018-07-26 RX ADMIN — INSULIN ASPART 4 UNITS: 100 INJECTION, SOLUTION INTRAVENOUS; SUBCUTANEOUS at 11:21

## 2018-07-26 RX ADMIN — Medication 150 MG: at 18:57

## 2018-07-26 RX ADMIN — ATORVASTATIN CALCIUM 40 MG: 20 TABLET, FILM COATED ORAL at 21:05

## 2018-07-26 RX ADMIN — MUPIROCIN 10 APPLICATION: 20 OINTMENT TOPICAL at 21:06

## 2018-07-27 LAB
ABO + RH BLD: NORMAL
ABO + RH BLD: NORMAL
ALBUMIN SERPL-MCNC: 3.7 G/DL (ref 3.5–5.2)
ANION GAP SERPL CALCULATED.3IONS-SCNC: 13 MMOL/L
BH BB BLOOD EXPIRATION DATE: NORMAL
BH BB BLOOD EXPIRATION DATE: NORMAL
BH BB BLOOD TYPE BARCODE: 5100
BH BB BLOOD TYPE BARCODE: 5100
BH BB DISPENSE STATUS: NORMAL
BH BB DISPENSE STATUS: NORMAL
BH BB PRODUCT CODE: NORMAL
BH BB PRODUCT CODE: NORMAL
BH BB UNIT NUMBER: NORMAL
BH BB UNIT NUMBER: NORMAL
BH CV ECHO MEAS - BSA(HAYCOCK): 2.8 M^2
BH CV ECHO MEAS - BSA: 2.6 M^2
BH CV ECHO MEAS - BZI_BMI: 41.3 KILOGRAMS/M^2
BH CV ECHO MEAS - BZI_METRIC_HEIGHT: 185.4 CM
BH CV ECHO MEAS - BZI_METRIC_WEIGHT: 142 KG
BUN BLD-MCNC: 36 MG/DL (ref 8–23)
BUN/CREAT SERPL: 22.4 (ref 7–25)
CALCIUM SPEC-SCNC: 8.5 MG/DL (ref 8.6–10.5)
CHLORIDE SERPL-SCNC: 99 MMOL/L (ref 98–107)
CO2 SERPL-SCNC: 26 MMOL/L (ref 22–29)
CREAT BLD-MCNC: 1.61 MG/DL (ref 0.76–1.27)
DEPRECATED RDW RBC AUTO: 46.8 FL (ref 37–54)
ERYTHROCYTE [DISTWIDTH] IN BLOOD BY AUTOMATED COUNT: 14.3 % (ref 11.5–14.5)
GFR SERPL CREATININE-BSD FRML MDRD: 42 ML/MIN/1.73
GLUCOSE BLD-MCNC: 173 MG/DL (ref 65–99)
GLUCOSE BLDC GLUCOMTR-MCNC: 193 MG/DL (ref 70–130)
GLUCOSE BLDC GLUCOMTR-MCNC: 212 MG/DL (ref 70–130)
GLUCOSE BLDC GLUCOMTR-MCNC: 253 MG/DL (ref 70–130)
GLUCOSE BLDC GLUCOMTR-MCNC: 295 MG/DL (ref 70–130)
HCT VFR BLD AUTO: 29.4 % (ref 40.4–52.2)
HGB BLD-MCNC: 9.8 G/DL (ref 13.7–17.6)
MAGNESIUM SERPL-MCNC: 2.2 MG/DL (ref 1.6–2.4)
MAXIMAL PREDICTED HEART RATE: 147 BPM
MCH RBC QN AUTO: 29.9 PG (ref 27–32.7)
MCHC RBC AUTO-ENTMCNC: 33.3 G/DL (ref 32.6–36.4)
MCV RBC AUTO: 89.6 FL (ref 79.8–96.2)
PHOSPHATE SERPL-MCNC: 2.4 MG/DL (ref 2.5–4.5)
PLATELET # BLD AUTO: 142 10*3/MM3 (ref 140–500)
PMV BLD AUTO: 10.4 FL (ref 6–12)
POTASSIUM BLD-SCNC: 3.5 MMOL/L (ref 3.5–5.2)
RBC # BLD AUTO: 3.28 10*6/MM3 (ref 4.6–6)
SODIUM BLD-SCNC: 138 MMOL/L (ref 136–145)
STRESS TARGET HR: 125 BPM
UNIT  ABO: NORMAL
UNIT  ABO: NORMAL
UNIT  RH: NORMAL
UNIT  RH: NORMAL
WBC NRBC COR # BLD: 6.94 10*3/MM3 (ref 4.5–10.7)

## 2018-07-27 PROCEDURE — 63710000001 INSULIN ASPART PER 5 UNITS: Performed by: PHYSICIAN ASSISTANT

## 2018-07-27 PROCEDURE — 97166 OT EVAL MOD COMPLEX 45 MIN: CPT

## 2018-07-27 PROCEDURE — 85027 COMPLETE CBC AUTOMATED: CPT | Performed by: THORACIC SURGERY (CARDIOTHORACIC VASCULAR SURGERY)

## 2018-07-27 PROCEDURE — 25010000002 FUROSEMIDE PER 20 MG: Performed by: INTERNAL MEDICINE

## 2018-07-27 PROCEDURE — 83735 ASSAY OF MAGNESIUM: CPT | Performed by: INTERNAL MEDICINE

## 2018-07-27 PROCEDURE — 99232 SBSQ HOSP IP/OBS MODERATE 35: CPT | Performed by: INTERNAL MEDICINE

## 2018-07-27 PROCEDURE — 82962 GLUCOSE BLOOD TEST: CPT

## 2018-07-27 PROCEDURE — 99024 POSTOP FOLLOW-UP VISIT: CPT | Performed by: NURSE PRACTITIONER

## 2018-07-27 PROCEDURE — 80069 RENAL FUNCTION PANEL: CPT | Performed by: INTERNAL MEDICINE

## 2018-07-27 PROCEDURE — 97110 THERAPEUTIC EXERCISES: CPT

## 2018-07-27 PROCEDURE — 25010000002 ENOXAPARIN PER 10 MG: Performed by: THORACIC SURGERY (CARDIOTHORACIC VASCULAR SURGERY)

## 2018-07-27 RX ORDER — HYDROCODONE BITARTRATE AND ACETAMINOPHEN 5; 325 MG/1; MG/1
2 TABLET ORAL EVERY 4 HOURS PRN
Qty: 80 TABLET | Refills: 0 | Status: SHIPPED | OUTPATIENT
Start: 2018-07-27 | End: 2018-08-02

## 2018-07-27 RX ORDER — FUROSEMIDE 40 MG/1
40 TABLET ORAL
Status: DISCONTINUED | OUTPATIENT
Start: 2018-07-27 | End: 2018-07-28

## 2018-07-27 RX ORDER — CLOPIDOGREL BISULFATE 75 MG/1
75 TABLET ORAL DAILY
Status: DISCONTINUED | OUTPATIENT
Start: 2018-07-27 | End: 2018-08-02 | Stop reason: HOSPADM

## 2018-07-27 RX ORDER — LISINOPRIL 5 MG/1
5 TABLET ORAL
Status: DISCONTINUED | OUTPATIENT
Start: 2018-07-27 | End: 2018-07-29

## 2018-07-27 RX ADMIN — METOPROLOL TARTRATE 50 MG: 50 TABLET, FILM COATED ORAL at 08:38

## 2018-07-27 RX ADMIN — TAMSULOSIN HYDROCHLORIDE 0.4 MG: 0.4 CAPSULE ORAL at 08:38

## 2018-07-27 RX ADMIN — FUROSEMIDE 40 MG: 40 TABLET ORAL at 16:58

## 2018-07-27 RX ADMIN — MUPIROCIN 10 APPLICATION: 20 OINTMENT TOPICAL at 00:00

## 2018-07-27 RX ADMIN — POTASSIUM CHLORIDE 20 MEQ: 750 CAPSULE, EXTENDED RELEASE ORAL at 08:38

## 2018-07-27 RX ADMIN — FUROSEMIDE 40 MG: 10 INJECTION, SOLUTION INTRAMUSCULAR; INTRAVENOUS at 04:59

## 2018-07-27 RX ADMIN — PANTOPRAZOLE SODIUM 40 MG: 40 TABLET, DELAYED RELEASE ORAL at 06:39

## 2018-07-27 RX ADMIN — DOCUSATE SODIUM -SENNOSIDES 2 TABLET: 50; 8.6 TABLET, COATED ORAL at 21:30

## 2018-07-27 RX ADMIN — INSULIN ASPART 4 UNITS: 100 INJECTION, SOLUTION INTRAVENOUS; SUBCUTANEOUS at 16:58

## 2018-07-27 RX ADMIN — CHLORHEXIDINE GLUCONATE 15 ML: 1.2 RINSE ORAL at 06:39

## 2018-07-27 RX ADMIN — MUPIROCIN 10 APPLICATION: 20 OINTMENT TOPICAL at 21:28

## 2018-07-27 RX ADMIN — INSULIN ASPART 6 UNITS: 100 INJECTION, SOLUTION INTRAVENOUS; SUBCUTANEOUS at 21:30

## 2018-07-27 RX ADMIN — ENOXAPARIN SODIUM 40 MG: 40 INJECTION SUBCUTANEOUS at 17:53

## 2018-07-27 RX ADMIN — ATORVASTATIN CALCIUM 40 MG: 20 TABLET, FILM COATED ORAL at 21:30

## 2018-07-27 RX ADMIN — CLOPIDOGREL 75 MG: 75 TABLET, FILM COATED ORAL at 14:48

## 2018-07-27 RX ADMIN — ASPIRIN 81 MG: 81 TABLET, DELAYED RELEASE ORAL at 08:38

## 2018-07-27 RX ADMIN — INSULIN ASPART 2 UNITS: 100 INJECTION, SOLUTION INTRAVENOUS; SUBCUTANEOUS at 08:39

## 2018-07-27 RX ADMIN — INSULIN ASPART 6 UNITS: 100 INJECTION, SOLUTION INTRAVENOUS; SUBCUTANEOUS at 11:22

## 2018-07-27 RX ADMIN — Medication 150 MG: at 08:38

## 2018-07-27 RX ADMIN — CHLORHEXIDINE GLUCONATE 15 ML: 1.2 RINSE ORAL at 14:48

## 2018-07-27 RX ADMIN — METOPROLOL TARTRATE 50 MG: 50 TABLET, FILM COATED ORAL at 21:29

## 2018-07-27 RX ADMIN — LISINOPRIL 5 MG: 5 TABLET ORAL at 11:22

## 2018-07-28 LAB
ALBUMIN SERPL-MCNC: 3.3 G/DL (ref 3.5–5.2)
ANION GAP SERPL CALCULATED.3IONS-SCNC: 13.4 MMOL/L
BUN BLD-MCNC: 40 MG/DL (ref 8–23)
BUN/CREAT SERPL: 21.9 (ref 7–25)
CALCIUM SPEC-SCNC: 8.2 MG/DL (ref 8.6–10.5)
CHLORIDE SERPL-SCNC: 97 MMOL/L (ref 98–107)
CO2 SERPL-SCNC: 27.6 MMOL/L (ref 22–29)
CREAT BLD-MCNC: 1.83 MG/DL (ref 0.76–1.27)
GFR SERPL CREATININE-BSD FRML MDRD: 36 ML/MIN/1.73
GLUCOSE BLD-MCNC: 156 MG/DL (ref 65–99)
GLUCOSE BLDC GLUCOMTR-MCNC: 174 MG/DL (ref 70–130)
GLUCOSE BLDC GLUCOMTR-MCNC: 241 MG/DL (ref 70–130)
GLUCOSE BLDC GLUCOMTR-MCNC: 265 MG/DL (ref 70–130)
GLUCOSE BLDC GLUCOMTR-MCNC: 352 MG/DL (ref 70–130)
PHOSPHATE SERPL-MCNC: 2.6 MG/DL (ref 2.5–4.5)
POTASSIUM BLD-SCNC: 3.2 MMOL/L (ref 3.5–5.2)
SODIUM BLD-SCNC: 138 MMOL/L (ref 136–145)

## 2018-07-28 PROCEDURE — 99024 POSTOP FOLLOW-UP VISIT: CPT | Performed by: THORACIC SURGERY (CARDIOTHORACIC VASCULAR SURGERY)

## 2018-07-28 PROCEDURE — 80069 RENAL FUNCTION PANEL: CPT | Performed by: INTERNAL MEDICINE

## 2018-07-28 PROCEDURE — 99231 SBSQ HOSP IP/OBS SF/LOW 25: CPT | Performed by: NURSE PRACTITIONER

## 2018-07-28 PROCEDURE — 63710000001 INSULIN ASPART PER 5 UNITS: Performed by: PHYSICIAN ASSISTANT

## 2018-07-28 PROCEDURE — 97110 THERAPEUTIC EXERCISES: CPT

## 2018-07-28 PROCEDURE — 82962 GLUCOSE BLOOD TEST: CPT

## 2018-07-28 PROCEDURE — 25010000002 ENOXAPARIN PER 10 MG: Performed by: THORACIC SURGERY (CARDIOTHORACIC VASCULAR SURGERY)

## 2018-07-28 RX ORDER — POTASSIUM CHLORIDE 750 MG/1
40 CAPSULE, EXTENDED RELEASE ORAL ONCE
Status: COMPLETED | OUTPATIENT
Start: 2018-07-28 | End: 2018-07-28

## 2018-07-28 RX ORDER — FUROSEMIDE 40 MG/1
40 TABLET ORAL DAILY
Status: DISCONTINUED | OUTPATIENT
Start: 2018-07-28 | End: 2018-08-02 | Stop reason: HOSPADM

## 2018-07-28 RX ADMIN — METOPROLOL TARTRATE 50 MG: 50 TABLET, FILM COATED ORAL at 21:33

## 2018-07-28 RX ADMIN — FUROSEMIDE 40 MG: 40 TABLET ORAL at 09:32

## 2018-07-28 RX ADMIN — METOPROLOL TARTRATE 50 MG: 50 TABLET, FILM COATED ORAL at 09:33

## 2018-07-28 RX ADMIN — ATORVASTATIN CALCIUM 40 MG: 20 TABLET, FILM COATED ORAL at 21:28

## 2018-07-28 RX ADMIN — MUPIROCIN 10 APPLICATION: 20 OINTMENT TOPICAL at 21:28

## 2018-07-28 RX ADMIN — PANTOPRAZOLE SODIUM 40 MG: 40 TABLET, DELAYED RELEASE ORAL at 06:16

## 2018-07-28 RX ADMIN — LISINOPRIL 5 MG: 5 TABLET ORAL at 09:32

## 2018-07-28 RX ADMIN — Medication 150 MG: at 09:32

## 2018-07-28 RX ADMIN — INSULIN ASPART 6 UNITS: 100 INJECTION, SOLUTION INTRAVENOUS; SUBCUTANEOUS at 22:34

## 2018-07-28 RX ADMIN — POTASSIUM CHLORIDE 40 MEQ: 750 CAPSULE, EXTENDED RELEASE ORAL at 06:17

## 2018-07-28 RX ADMIN — DOCUSATE SODIUM -SENNOSIDES 2 TABLET: 50; 8.6 TABLET, COATED ORAL at 21:28

## 2018-07-28 RX ADMIN — INSULIN ASPART 4 UNITS: 100 INJECTION, SOLUTION INTRAVENOUS; SUBCUTANEOUS at 17:57

## 2018-07-28 RX ADMIN — TAMSULOSIN HYDROCHLORIDE 0.4 MG: 0.4 CAPSULE ORAL at 09:32

## 2018-07-28 RX ADMIN — POTASSIUM CHLORIDE 20 MEQ: 750 CAPSULE, EXTENDED RELEASE ORAL at 09:33

## 2018-07-28 RX ADMIN — CLOPIDOGREL 75 MG: 75 TABLET, FILM COATED ORAL at 09:32

## 2018-07-28 RX ADMIN — MUPIROCIN 1 APPLICATION: 20 OINTMENT TOPICAL at 09:33

## 2018-07-28 RX ADMIN — ASPIRIN 81 MG: 81 TABLET, DELAYED RELEASE ORAL at 09:33

## 2018-07-28 RX ADMIN — ENOXAPARIN SODIUM 40 MG: 40 INJECTION SUBCUTANEOUS at 17:58

## 2018-07-28 RX ADMIN — INSULIN ASPART 2 UNITS: 100 INJECTION, SOLUTION INTRAVENOUS; SUBCUTANEOUS at 07:51

## 2018-07-28 RX ADMIN — INSULIN ASPART 8 UNITS: 100 INJECTION, SOLUTION INTRAVENOUS; SUBCUTANEOUS at 11:12

## 2018-07-29 LAB
ALBUMIN SERPL-MCNC: 3 G/DL (ref 3.5–5.2)
ANION GAP SERPL CALCULATED.3IONS-SCNC: 11.7 MMOL/L
BACTERIA UR QL AUTO: ABNORMAL /HPF
BILIRUB UR QL STRIP: NEGATIVE
BUN BLD-MCNC: 47 MG/DL (ref 8–23)
BUN/CREAT SERPL: 21.4 (ref 7–25)
CALCIUM SPEC-SCNC: 8.2 MG/DL (ref 8.6–10.5)
CHLORIDE SERPL-SCNC: 99 MMOL/L (ref 98–107)
CLARITY UR: ABNORMAL
CO2 SERPL-SCNC: 26.3 MMOL/L (ref 22–29)
COLOR UR: YELLOW
CREAT BLD-MCNC: 2.2 MG/DL (ref 0.76–1.27)
GFR SERPL CREATININE-BSD FRML MDRD: 29 ML/MIN/1.73
GLUCOSE BLD-MCNC: 189 MG/DL (ref 65–99)
GLUCOSE BLDC GLUCOMTR-MCNC: 213 MG/DL (ref 70–130)
GLUCOSE BLDC GLUCOMTR-MCNC: 266 MG/DL (ref 70–130)
GLUCOSE BLDC GLUCOMTR-MCNC: 294 MG/DL (ref 70–130)
GLUCOSE BLDC GLUCOMTR-MCNC: 349 MG/DL (ref 70–130)
GLUCOSE UR STRIP-MCNC: NEGATIVE MG/DL
HGB UR QL STRIP.AUTO: ABNORMAL
HYALINE CASTS UR QL AUTO: ABNORMAL /LPF
KETONES UR QL STRIP: NEGATIVE
LEUKOCYTE ESTERASE UR QL STRIP.AUTO: ABNORMAL
NITRITE UR QL STRIP: NEGATIVE
PH UR STRIP.AUTO: 8.5 [PH] (ref 5–8)
PHOSPHATE SERPL-MCNC: 2.9 MG/DL (ref 2.5–4.5)
POTASSIUM BLD-SCNC: 3.7 MMOL/L (ref 3.5–5.2)
PROT UR QL STRIP: ABNORMAL
RBC # UR: ABNORMAL /HPF
REF LAB TEST METHOD: ABNORMAL
SODIUM BLD-SCNC: 137 MMOL/L (ref 136–145)
SP GR UR STRIP: 1.01 (ref 1–1.03)
SQUAMOUS #/AREA URNS HPF: ABNORMAL /HPF
UROBILINOGEN UR QL STRIP: ABNORMAL
WBC UR QL AUTO: ABNORMAL /HPF

## 2018-07-29 PROCEDURE — 80069 RENAL FUNCTION PANEL: CPT | Performed by: INTERNAL MEDICINE

## 2018-07-29 PROCEDURE — 82962 GLUCOSE BLOOD TEST: CPT

## 2018-07-29 PROCEDURE — 25010000002 ENOXAPARIN PER 10 MG: Performed by: THORACIC SURGERY (CARDIOTHORACIC VASCULAR SURGERY)

## 2018-07-29 PROCEDURE — 81001 URINALYSIS AUTO W/SCOPE: CPT | Performed by: INTERNAL MEDICINE

## 2018-07-29 PROCEDURE — 97110 THERAPEUTIC EXERCISES: CPT

## 2018-07-29 PROCEDURE — 63710000001 INSULIN ASPART PER 5 UNITS: Performed by: PHYSICIAN ASSISTANT

## 2018-07-29 PROCEDURE — 99024 POSTOP FOLLOW-UP VISIT: CPT | Performed by: THORACIC SURGERY (CARDIOTHORACIC VASCULAR SURGERY)

## 2018-07-29 PROCEDURE — 87086 URINE CULTURE/COLONY COUNT: CPT | Performed by: INTERNAL MEDICINE

## 2018-07-29 PROCEDURE — 99231 SBSQ HOSP IP/OBS SF/LOW 25: CPT | Performed by: NURSE PRACTITIONER

## 2018-07-29 PROCEDURE — 87186 SC STD MICRODIL/AGAR DIL: CPT | Performed by: INTERNAL MEDICINE

## 2018-07-29 RX ORDER — LEVOFLOXACIN 250 MG/1
250 TABLET ORAL EVERY 24 HOURS
Status: DISCONTINUED | OUTPATIENT
Start: 2018-07-29 | End: 2018-08-02 | Stop reason: HOSPADM

## 2018-07-29 RX ORDER — LISINOPRIL 2.5 MG/1
2.5 TABLET ORAL
Status: DISCONTINUED | OUTPATIENT
Start: 2018-07-30 | End: 2018-07-29

## 2018-07-29 RX ADMIN — ATORVASTATIN CALCIUM 40 MG: 20 TABLET, FILM COATED ORAL at 20:26

## 2018-07-29 RX ADMIN — CLOPIDOGREL 75 MG: 75 TABLET, FILM COATED ORAL at 09:27

## 2018-07-29 RX ADMIN — LEVOFLOXACIN 250 MG: 250 TABLET, FILM COATED ORAL at 13:37

## 2018-07-29 RX ADMIN — DOCUSATE SODIUM -SENNOSIDES 2 TABLET: 50; 8.6 TABLET, COATED ORAL at 20:26

## 2018-07-29 RX ADMIN — MUPIROCIN 10 APPLICATION: 20 OINTMENT TOPICAL at 20:26

## 2018-07-29 RX ADMIN — INSULIN ASPART 6 UNITS: 100 INJECTION, SOLUTION INTRAVENOUS; SUBCUTANEOUS at 21:48

## 2018-07-29 RX ADMIN — INSULIN ASPART 6 UNITS: 100 INJECTION, SOLUTION INTRAVENOUS; SUBCUTANEOUS at 17:39

## 2018-07-29 RX ADMIN — METOPROLOL TARTRATE 25 MG: 25 TABLET ORAL at 20:29

## 2018-07-29 RX ADMIN — TAMSULOSIN HYDROCHLORIDE 0.4 MG: 0.4 CAPSULE ORAL at 09:27

## 2018-07-29 RX ADMIN — INSULIN ASPART 7 UNITS: 100 INJECTION, SOLUTION INTRAVENOUS; SUBCUTANEOUS at 11:35

## 2018-07-29 RX ADMIN — MUPIROCIN 1 APPLICATION: 20 OINTMENT TOPICAL at 09:28

## 2018-07-29 RX ADMIN — INSULIN ASPART 4 UNITS: 100 INJECTION, SOLUTION INTRAVENOUS; SUBCUTANEOUS at 06:48

## 2018-07-29 RX ADMIN — METOPROLOL TARTRATE 25 MG: 25 TABLET ORAL at 09:27

## 2018-07-29 RX ADMIN — Medication 150 MG: at 09:27

## 2018-07-29 RX ADMIN — PANTOPRAZOLE SODIUM 40 MG: 40 TABLET, DELAYED RELEASE ORAL at 05:32

## 2018-07-29 RX ADMIN — ASPIRIN 81 MG: 81 TABLET, DELAYED RELEASE ORAL at 09:27

## 2018-07-29 RX ADMIN — ENOXAPARIN SODIUM 40 MG: 40 INJECTION SUBCUTANEOUS at 17:39

## 2018-07-30 LAB
ALBUMIN SERPL-MCNC: 3.2 G/DL (ref 3.5–5.2)
ANION GAP SERPL CALCULATED.3IONS-SCNC: 13.6 MMOL/L
BASOPHILS # BLD AUTO: 0.02 10*3/MM3 (ref 0–0.2)
BASOPHILS NFR BLD AUTO: 0.2 % (ref 0–1.5)
BUN BLD-MCNC: 50 MG/DL (ref 8–23)
BUN/CREAT SERPL: 25.6 (ref 7–25)
CALCIUM SPEC-SCNC: 8.4 MG/DL (ref 8.6–10.5)
CHLORIDE SERPL-SCNC: 98 MMOL/L (ref 98–107)
CO2 SERPL-SCNC: 25.4 MMOL/L (ref 22–29)
CREAT BLD-MCNC: 1.95 MG/DL (ref 0.76–1.27)
DEPRECATED RDW RBC AUTO: 47.4 FL (ref 37–54)
EOSINOPHIL # BLD AUTO: 0.41 10*3/MM3 (ref 0–0.7)
EOSINOPHIL NFR BLD AUTO: 4.1 % (ref 0.3–6.2)
ERYTHROCYTE [DISTWIDTH] IN BLOOD BY AUTOMATED COUNT: 14.4 % (ref 11.5–14.5)
GFR SERPL CREATININE-BSD FRML MDRD: 34 ML/MIN/1.73
GLUCOSE BLD-MCNC: 208 MG/DL (ref 65–99)
GLUCOSE BLDC GLUCOMTR-MCNC: 228 MG/DL (ref 70–130)
GLUCOSE BLDC GLUCOMTR-MCNC: 260 MG/DL (ref 70–130)
GLUCOSE BLDC GLUCOMTR-MCNC: 278 MG/DL (ref 70–130)
GLUCOSE BLDC GLUCOMTR-MCNC: 280 MG/DL (ref 70–130)
HCT VFR BLD AUTO: 27.6 % (ref 40.4–52.2)
HGB BLD-MCNC: 9 G/DL (ref 13.7–17.6)
IMM GRANULOCYTES # BLD: 0.05 10*3/MM3 (ref 0–0.03)
IMM GRANULOCYTES NFR BLD: 0.5 % (ref 0–0.5)
LYMPHOCYTES # BLD AUTO: 1.2 10*3/MM3 (ref 0.9–4.8)
LYMPHOCYTES NFR BLD AUTO: 11.9 % (ref 19.6–45.3)
MAGNESIUM SERPL-MCNC: 2.1 MG/DL (ref 1.6–2.4)
MCH RBC QN AUTO: 29.7 PG (ref 27–32.7)
MCHC RBC AUTO-ENTMCNC: 32.6 G/DL (ref 32.6–36.4)
MCV RBC AUTO: 91.1 FL (ref 79.8–96.2)
MONOCYTES # BLD AUTO: 0.6 10*3/MM3 (ref 0.2–1.2)
MONOCYTES NFR BLD AUTO: 6 % (ref 5–12)
NEUTROPHILS # BLD AUTO: 7.8 10*3/MM3 (ref 1.9–8.1)
NEUTROPHILS NFR BLD AUTO: 77.3 % (ref 42.7–76)
PHOSPHATE SERPL-MCNC: 3.1 MG/DL (ref 2.5–4.5)
PLATELET # BLD AUTO: 177 10*3/MM3 (ref 140–500)
PMV BLD AUTO: 10.1 FL (ref 6–12)
POTASSIUM BLD-SCNC: 3.5 MMOL/L (ref 3.5–5.2)
RBC # BLD AUTO: 3.03 10*6/MM3 (ref 4.6–6)
SODIUM BLD-SCNC: 137 MMOL/L (ref 136–145)
WBC NRBC COR # BLD: 10.08 10*3/MM3 (ref 4.5–10.7)

## 2018-07-30 PROCEDURE — 99232 SBSQ HOSP IP/OBS MODERATE 35: CPT | Performed by: INTERNAL MEDICINE

## 2018-07-30 PROCEDURE — 63710000001 INSULIN ASPART PER 5 UNITS: Performed by: PHYSICIAN ASSISTANT

## 2018-07-30 PROCEDURE — 82962 GLUCOSE BLOOD TEST: CPT

## 2018-07-30 PROCEDURE — 80069 RENAL FUNCTION PANEL: CPT | Performed by: INTERNAL MEDICINE

## 2018-07-30 PROCEDURE — 97110 THERAPEUTIC EXERCISES: CPT

## 2018-07-30 PROCEDURE — 83735 ASSAY OF MAGNESIUM: CPT | Performed by: INTERNAL MEDICINE

## 2018-07-30 PROCEDURE — 25010000002 ENOXAPARIN PER 10 MG: Performed by: THORACIC SURGERY (CARDIOTHORACIC VASCULAR SURGERY)

## 2018-07-30 PROCEDURE — 97535 SELF CARE MNGMENT TRAINING: CPT

## 2018-07-30 PROCEDURE — 85025 COMPLETE CBC W/AUTO DIFF WBC: CPT | Performed by: INTERNAL MEDICINE

## 2018-07-30 RX ORDER — METOPROLOL TARTRATE 50 MG/1
50 TABLET, FILM COATED ORAL EVERY 12 HOURS SCHEDULED
Status: DISCONTINUED | OUTPATIENT
Start: 2018-07-30 | End: 2018-08-02 | Stop reason: HOSPADM

## 2018-07-30 RX ADMIN — CLOPIDOGREL 75 MG: 75 TABLET, FILM COATED ORAL at 09:39

## 2018-07-30 RX ADMIN — LEVOFLOXACIN 250 MG: 250 TABLET, FILM COATED ORAL at 12:09

## 2018-07-30 RX ADMIN — ENOXAPARIN SODIUM 40 MG: 40 INJECTION SUBCUTANEOUS at 18:05

## 2018-07-30 RX ADMIN — ATORVASTATIN CALCIUM 40 MG: 20 TABLET, FILM COATED ORAL at 21:08

## 2018-07-30 RX ADMIN — FUROSEMIDE 40 MG: 40 TABLET ORAL at 09:39

## 2018-07-30 RX ADMIN — INSULIN ASPART 6 UNITS: 100 INJECTION, SOLUTION INTRAVENOUS; SUBCUTANEOUS at 18:04

## 2018-07-30 RX ADMIN — MUPIROCIN 10 APPLICATION: 20 OINTMENT TOPICAL at 09:40

## 2018-07-30 RX ADMIN — METOPROLOL TARTRATE 50 MG: 25 TABLET ORAL at 21:08

## 2018-07-30 RX ADMIN — INSULIN ASPART 6 UNITS: 100 INJECTION, SOLUTION INTRAVENOUS; SUBCUTANEOUS at 21:08

## 2018-07-30 RX ADMIN — PANTOPRAZOLE SODIUM 40 MG: 40 TABLET, DELAYED RELEASE ORAL at 05:33

## 2018-07-30 RX ADMIN — ASPIRIN 81 MG: 81 TABLET, DELAYED RELEASE ORAL at 09:39

## 2018-07-30 RX ADMIN — TAMSULOSIN HYDROCHLORIDE 0.4 MG: 0.4 CAPSULE ORAL at 09:39

## 2018-07-30 RX ADMIN — MUPIROCIN 10 APPLICATION: 20 OINTMENT TOPICAL at 21:09

## 2018-07-30 RX ADMIN — Medication 150 MG: at 09:39

## 2018-07-30 RX ADMIN — POTASSIUM CHLORIDE 40 MEQ: 750 CAPSULE, EXTENDED RELEASE ORAL at 09:40

## 2018-07-30 RX ADMIN — INSULIN ASPART 6 UNITS: 100 INJECTION, SOLUTION INTRAVENOUS; SUBCUTANEOUS at 12:09

## 2018-07-30 RX ADMIN — INSULIN ASPART 4 UNITS: 100 INJECTION, SOLUTION INTRAVENOUS; SUBCUTANEOUS at 06:58

## 2018-07-30 RX ADMIN — METOPROLOL TARTRATE 50 MG: 25 TABLET ORAL at 09:39

## 2018-07-30 RX ADMIN — POTASSIUM CHLORIDE 40 MEQ: 750 CAPSULE, EXTENDED RELEASE ORAL at 05:29

## 2018-07-31 LAB
ALBUMIN SERPL-MCNC: 3.2 G/DL (ref 3.5–5.2)
ANION GAP SERPL CALCULATED.3IONS-SCNC: 13.7 MMOL/L
BACTERIA SPEC AEROBE CULT: ABNORMAL
BUN BLD-MCNC: 43 MG/DL (ref 8–23)
BUN/CREAT SERPL: 25.9 (ref 7–25)
CALCIUM SPEC-SCNC: 8.6 MG/DL (ref 8.6–10.5)
CHLORIDE SERPL-SCNC: 99 MMOL/L (ref 98–107)
CO2 SERPL-SCNC: 26.3 MMOL/L (ref 22–29)
CREAT BLD-MCNC: 1.66 MG/DL (ref 0.76–1.27)
GFR SERPL CREATININE-BSD FRML MDRD: 41 ML/MIN/1.73
GLUCOSE BLD-MCNC: 172 MG/DL (ref 65–99)
GLUCOSE BLDC GLUCOMTR-MCNC: 183 MG/DL (ref 70–130)
GLUCOSE BLDC GLUCOMTR-MCNC: 199 MG/DL (ref 70–130)
GLUCOSE BLDC GLUCOMTR-MCNC: 278 MG/DL (ref 70–130)
GLUCOSE BLDC GLUCOMTR-MCNC: 339 MG/DL (ref 70–130)
PHOSPHATE SERPL-MCNC: 3.5 MG/DL (ref 2.5–4.5)
POTASSIUM BLD-SCNC: 3.9 MMOL/L (ref 3.5–5.2)
SODIUM BLD-SCNC: 139 MMOL/L (ref 136–145)

## 2018-07-31 PROCEDURE — 99024 POSTOP FOLLOW-UP VISIT: CPT | Performed by: NURSE PRACTITIONER

## 2018-07-31 PROCEDURE — 63710000001 INSULIN ASPART PER 5 UNITS: Performed by: PHYSICIAN ASSISTANT

## 2018-07-31 PROCEDURE — 25010000002 ENOXAPARIN PER 10 MG: Performed by: THORACIC SURGERY (CARDIOTHORACIC VASCULAR SURGERY)

## 2018-07-31 PROCEDURE — 82962 GLUCOSE BLOOD TEST: CPT

## 2018-07-31 PROCEDURE — 80069 RENAL FUNCTION PANEL: CPT | Performed by: INTERNAL MEDICINE

## 2018-07-31 PROCEDURE — 97535 SELF CARE MNGMENT TRAINING: CPT

## 2018-07-31 PROCEDURE — 97110 THERAPEUTIC EXERCISES: CPT

## 2018-07-31 PROCEDURE — 99232 SBSQ HOSP IP/OBS MODERATE 35: CPT | Performed by: INTERNAL MEDICINE

## 2018-07-31 RX ORDER — GLIPIZIDE 5 MG/1
5 TABLET ORAL
Status: DISCONTINUED | OUTPATIENT
Start: 2018-07-31 | End: 2018-08-02 | Stop reason: HOSPADM

## 2018-07-31 RX ADMIN — INSULIN ASPART 7 UNITS: 100 INJECTION, SOLUTION INTRAVENOUS; SUBCUTANEOUS at 12:40

## 2018-07-31 RX ADMIN — METOPROLOL TARTRATE 50 MG: 25 TABLET ORAL at 08:19

## 2018-07-31 RX ADMIN — METFORMIN HYDROCHLORIDE 500 MG: 500 TABLET ORAL at 14:55

## 2018-07-31 RX ADMIN — METOPROLOL TARTRATE 50 MG: 25 TABLET ORAL at 22:16

## 2018-07-31 RX ADMIN — MUPIROCIN 10 APPLICATION: 20 OINTMENT TOPICAL at 08:18

## 2018-07-31 RX ADMIN — INSULIN ASPART 2 UNITS: 100 INJECTION, SOLUTION INTRAVENOUS; SUBCUTANEOUS at 22:17

## 2018-07-31 RX ADMIN — ENOXAPARIN SODIUM 40 MG: 40 INJECTION SUBCUTANEOUS at 17:20

## 2018-07-31 RX ADMIN — POTASSIUM CHLORIDE 20 MEQ: 750 CAPSULE, EXTENDED RELEASE ORAL at 08:19

## 2018-07-31 RX ADMIN — TAMSULOSIN HYDROCHLORIDE 0.4 MG: 0.4 CAPSULE ORAL at 08:18

## 2018-07-31 RX ADMIN — INSULIN ASPART 2 UNITS: 100 INJECTION, SOLUTION INTRAVENOUS; SUBCUTANEOUS at 06:20

## 2018-07-31 RX ADMIN — MUPIROCIN 10 APPLICATION: 20 OINTMENT TOPICAL at 22:17

## 2018-07-31 RX ADMIN — LEVOFLOXACIN 250 MG: 250 TABLET, FILM COATED ORAL at 12:40

## 2018-07-31 RX ADMIN — CLOPIDOGREL 75 MG: 75 TABLET, FILM COATED ORAL at 08:18

## 2018-07-31 RX ADMIN — FUROSEMIDE 40 MG: 40 TABLET ORAL at 08:18

## 2018-07-31 RX ADMIN — PANTOPRAZOLE SODIUM 40 MG: 40 TABLET, DELAYED RELEASE ORAL at 06:20

## 2018-07-31 RX ADMIN — ASPIRIN 81 MG: 81 TABLET, DELAYED RELEASE ORAL at 08:19

## 2018-07-31 RX ADMIN — GLIPIZIDE 5 MG: 5 TABLET ORAL at 14:55

## 2018-07-31 RX ADMIN — ATORVASTATIN CALCIUM 40 MG: 20 TABLET, FILM COATED ORAL at 22:16

## 2018-07-31 RX ADMIN — Medication 150 MG: at 08:19

## 2018-07-31 RX ADMIN — INSULIN ASPART 6 UNITS: 100 INJECTION, SOLUTION INTRAVENOUS; SUBCUTANEOUS at 17:20

## 2018-08-01 LAB
ALBUMIN SERPL-MCNC: 3.2 G/DL (ref 3.5–5.2)
ANION GAP SERPL CALCULATED.3IONS-SCNC: 15.2 MMOL/L
BASOPHILS # BLD AUTO: 0.02 10*3/MM3 (ref 0–0.2)
BASOPHILS NFR BLD AUTO: 0.2 % (ref 0–1.5)
BUN BLD-MCNC: 40 MG/DL (ref 8–23)
BUN/CREAT SERPL: 21.9 (ref 7–25)
CALCIUM SPEC-SCNC: 9.3 MG/DL (ref 8.6–10.5)
CHLORIDE SERPL-SCNC: 100 MMOL/L (ref 98–107)
CO2 SERPL-SCNC: 25.8 MMOL/L (ref 22–29)
CREAT BLD-MCNC: 1.83 MG/DL (ref 0.76–1.27)
DEPRECATED RDW RBC AUTO: 47.6 FL (ref 37–54)
EOSINOPHIL # BLD AUTO: 0.81 10*3/MM3 (ref 0–0.7)
EOSINOPHIL NFR BLD AUTO: 9.3 % (ref 0.3–6.2)
ERYTHROCYTE [DISTWIDTH] IN BLOOD BY AUTOMATED COUNT: 14.2 % (ref 11.5–14.5)
GFR SERPL CREATININE-BSD FRML MDRD: 36 ML/MIN/1.73
GLUCOSE BLD-MCNC: 164 MG/DL (ref 65–99)
GLUCOSE BLDC GLUCOMTR-MCNC: 179 MG/DL (ref 70–130)
GLUCOSE BLDC GLUCOMTR-MCNC: 185 MG/DL (ref 70–130)
GLUCOSE BLDC GLUCOMTR-MCNC: 261 MG/DL (ref 70–130)
GLUCOSE BLDC GLUCOMTR-MCNC: 332 MG/DL (ref 70–130)
HCT VFR BLD AUTO: 29.9 % (ref 40.4–52.2)
HGB BLD-MCNC: 9.5 G/DL (ref 13.7–17.6)
IMM GRANULOCYTES # BLD: 0.04 10*3/MM3 (ref 0–0.03)
IMM GRANULOCYTES NFR BLD: 0.5 % (ref 0–0.5)
LYMPHOCYTES # BLD AUTO: 1.68 10*3/MM3 (ref 0.9–4.8)
LYMPHOCYTES NFR BLD AUTO: 19.4 % (ref 19.6–45.3)
MAGNESIUM SERPL-MCNC: 2 MG/DL (ref 1.6–2.4)
MCH RBC QN AUTO: 29.3 PG (ref 27–32.7)
MCHC RBC AUTO-ENTMCNC: 31.8 G/DL (ref 32.6–36.4)
MCV RBC AUTO: 92.3 FL (ref 79.8–96.2)
MONOCYTES # BLD AUTO: 0.37 10*3/MM3 (ref 0.2–1.2)
MONOCYTES NFR BLD AUTO: 4.3 % (ref 5–12)
NEUTROPHILS # BLD AUTO: 5.76 10*3/MM3 (ref 1.9–8.1)
NEUTROPHILS NFR BLD AUTO: 66.3 % (ref 42.7–76)
PHOSPHATE SERPL-MCNC: 3.9 MG/DL (ref 2.5–4.5)
PLATELET # BLD AUTO: 210 10*3/MM3 (ref 140–500)
PMV BLD AUTO: 9.9 FL (ref 6–12)
POTASSIUM BLD-SCNC: 3.9 MMOL/L (ref 3.5–5.2)
RBC # BLD AUTO: 3.24 10*6/MM3 (ref 4.6–6)
SODIUM BLD-SCNC: 141 MMOL/L (ref 136–145)
T3FREE SERPL-MCNC: 2.51 PG/ML (ref 2–4.4)
TSH SERPL DL<=0.05 MIU/L-ACNC: 4.32 MIU/ML (ref 0.27–4.2)
WBC NRBC COR # BLD: 8.68 10*3/MM3 (ref 4.5–10.7)

## 2018-08-01 PROCEDURE — 25010000002 ENOXAPARIN PER 10 MG: Performed by: THORACIC SURGERY (CARDIOTHORACIC VASCULAR SURGERY)

## 2018-08-01 PROCEDURE — 97110 THERAPEUTIC EXERCISES: CPT | Performed by: OCCUPATIONAL THERAPIST

## 2018-08-01 PROCEDURE — 93005 ELECTROCARDIOGRAM TRACING: CPT | Performed by: INTERNAL MEDICINE

## 2018-08-01 PROCEDURE — 25010000002 AMIODARONE IN DEXTROSE 5% 150-4.21 MG/100ML-% SOLUTION: Performed by: NURSE PRACTITIONER

## 2018-08-01 PROCEDURE — 99024 POSTOP FOLLOW-UP VISIT: CPT | Performed by: NURSE PRACTITIONER

## 2018-08-01 PROCEDURE — 97110 THERAPEUTIC EXERCISES: CPT

## 2018-08-01 PROCEDURE — 99232 SBSQ HOSP IP/OBS MODERATE 35: CPT | Performed by: INTERNAL MEDICINE

## 2018-08-01 PROCEDURE — 84481 FREE ASSAY (FT-3): CPT | Performed by: NURSE PRACTITIONER

## 2018-08-01 PROCEDURE — 82962 GLUCOSE BLOOD TEST: CPT

## 2018-08-01 PROCEDURE — 85025 COMPLETE CBC W/AUTO DIFF WBC: CPT | Performed by: INTERNAL MEDICINE

## 2018-08-01 PROCEDURE — 84443 ASSAY THYROID STIM HORMONE: CPT | Performed by: NURSE PRACTITIONER

## 2018-08-01 PROCEDURE — 63710000001 INSULIN ASPART PER 5 UNITS: Performed by: PHYSICIAN ASSISTANT

## 2018-08-01 PROCEDURE — 83735 ASSAY OF MAGNESIUM: CPT | Performed by: NURSE PRACTITIONER

## 2018-08-01 PROCEDURE — 80069 RENAL FUNCTION PANEL: CPT | Performed by: INTERNAL MEDICINE

## 2018-08-01 PROCEDURE — 25010000002 AMIODARONE IN DEXTROSE 5% 360-4.14 MG/200ML-% SOLUTION: Performed by: NURSE PRACTITIONER

## 2018-08-01 RX ORDER — AMIODARONE HYDROCHLORIDE 200 MG/1
300 TABLET ORAL EVERY 12 HOURS SCHEDULED
Status: DISCONTINUED | OUTPATIENT
Start: 2018-08-01 | End: 2018-08-01

## 2018-08-01 RX ADMIN — ALPRAZOLAM 0.25 MG: 0.25 TABLET ORAL at 22:34

## 2018-08-01 RX ADMIN — INSULIN ASPART 6 UNITS: 100 INJECTION, SOLUTION INTRAVENOUS; SUBCUTANEOUS at 21:45

## 2018-08-01 RX ADMIN — INSULIN ASPART 2 UNITS: 100 INJECTION, SOLUTION INTRAVENOUS; SUBCUTANEOUS at 17:15

## 2018-08-01 RX ADMIN — METFORMIN HYDROCHLORIDE 500 MG: 500 TABLET ORAL at 17:17

## 2018-08-01 RX ADMIN — TAMSULOSIN HYDROCHLORIDE 0.4 MG: 0.4 CAPSULE ORAL at 08:27

## 2018-08-01 RX ADMIN — AMIODARONE HYDROCHLORIDE 300 MG: 200 TABLET ORAL at 01:14

## 2018-08-01 RX ADMIN — METFORMIN HYDROCHLORIDE 500 MG: 500 TABLET ORAL at 08:27

## 2018-08-01 RX ADMIN — AMIODARONE HYDROCHLORIDE 0.5 MG/MIN: 1.8 INJECTION, SOLUTION INTRAVENOUS at 14:47

## 2018-08-01 RX ADMIN — POTASSIUM CHLORIDE 20 MEQ: 750 CAPSULE, EXTENDED RELEASE ORAL at 08:27

## 2018-08-01 RX ADMIN — ATORVASTATIN CALCIUM 40 MG: 20 TABLET, FILM COATED ORAL at 22:34

## 2018-08-01 RX ADMIN — MUPIROCIN 10 APPLICATION: 20 OINTMENT TOPICAL at 08:55

## 2018-08-01 RX ADMIN — LEVOFLOXACIN 250 MG: 250 TABLET, FILM COATED ORAL at 12:25

## 2018-08-01 RX ADMIN — FUROSEMIDE 40 MG: 40 TABLET ORAL at 08:27

## 2018-08-01 RX ADMIN — AMIODARONE HYDROCHLORIDE 1 MG/MIN: 1.8 INJECTION, SOLUTION INTRAVENOUS at 08:42

## 2018-08-01 RX ADMIN — GLIPIZIDE 5 MG: 5 TABLET ORAL at 08:27

## 2018-08-01 RX ADMIN — ALPRAZOLAM 0.25 MG: 0.25 TABLET ORAL at 01:14

## 2018-08-01 RX ADMIN — PANTOPRAZOLE SODIUM 40 MG: 40 TABLET, DELAYED RELEASE ORAL at 08:27

## 2018-08-01 RX ADMIN — AMIODARONE HYDROCHLORIDE 150 MG: 1.5 INJECTION, SOLUTION INTRAVENOUS at 08:27

## 2018-08-01 RX ADMIN — ENOXAPARIN SODIUM 40 MG: 40 INJECTION SUBCUTANEOUS at 17:14

## 2018-08-01 RX ADMIN — INSULIN ASPART 7 UNITS: 100 INJECTION, SOLUTION INTRAVENOUS; SUBCUTANEOUS at 11:26

## 2018-08-01 RX ADMIN — METOPROLOL TARTRATE 50 MG: 25 TABLET ORAL at 21:45

## 2018-08-01 RX ADMIN — ASPIRIN 81 MG: 81 TABLET, DELAYED RELEASE ORAL at 08:27

## 2018-08-01 RX ADMIN — METOPROLOL TARTRATE 50 MG: 25 TABLET ORAL at 08:27

## 2018-08-01 RX ADMIN — INSULIN ASPART 2 UNITS: 100 INJECTION, SOLUTION INTRAVENOUS; SUBCUTANEOUS at 08:56

## 2018-08-01 RX ADMIN — CLOPIDOGREL 75 MG: 75 TABLET, FILM COATED ORAL at 08:27

## 2018-08-01 RX ADMIN — Medication 150 MG: at 08:27

## 2018-08-01 NOTE — THERAPY TREATMENT NOTE
Acute Care - Occupational Therapy Treatment Note  [facility]     Patient Name: Rock Maciel Jr.  : 1945  MRN: [id number]  Today's Date: 2018  Onset of Illness/Injury or Date of Surgery: 18     Referring Physician: Taty    Admit Date: 2018       ICD-10-CM ICD-9-CM   1. Arteriosclerosis of coronary artery I25.10 414.00   2. Nonrheumatic aortic valve stenosis I35.0 424.1   3. Decreased mobility R26.89 781.99   4. Essential hypertension I10 401.9   5. S/P CABG x 2 Z95.1 V45.81   6. S/P AVR (aortic valve replacement) Z95.2 V43.3   7. Asymptomatic bilateral carotid artery stenosis I65.23 433.10     433.30   8. Diabetes mellitus due to underlying condition with hyperosmolarity without coma, without long-term current use of insulin (CMS/[facility]) E08.00 249.20     Patient Active Problem List   Diagnosis   • Abnormal ECG   • Arteriosclerosis of coronary artery   • Cardiac murmur   • Essential hypertension   • LAFB (left anterior fascicular block)   • Bundle branch block, right   • Neuropathic arthropathy   • Diabetes mellitus (CMS/[facility])   • Obstructive apnea   • Gain of weight   • Gout   • Class 1 obesity due to excess calories without serious comorbidity with body mass index (BMI) of 30.0 to 30.9 in adult   • Chronic venous insufficiency   • Nonrheumatic aortic valve stenosis   • Carotid stenosis, asymptomatic   • Bradycardia   • Hyperlipidemia   • Bilateral carotid artery disease (CMS/[facility])   • Abnormal cardiovascular stress test   • Renal insufficiency     Past Medical History:   Diagnosis Date   • Allergic rhinitis    • Bronchitis    • Coronary artery disease    • Diabetes mellitus (CMS/[facility])    • DM (diabetes mellitus) (CMS/[facility])    • Encounter for annual health examination 2014    Annual Health Assessment   • Gout    • Hiatal hernia    • History of MRSA infection     RIGHT FOOT    • Hyperlipidemia    • Hypertension    • Murmur    • Pneumothorax on right    • Sleep apnea     pt wears  CPAP at night   • Wellness examination 06/24/2015    Annual Wellness Visit     Past Surgical History:   Procedure Laterality Date   • ARTERY SURGERY Bilateral     carotid   • CARDIAC CATHETERIZATION N/A 7/20/2018    Procedure: Left Heart Cath;  Surgeon: Jo Toney MD;  Location: [facility] INVASIVE LOCATION;  Service: Cardiovascular   • CARDIAC CATHETERIZATION N/A 7/20/2018    Procedure: Coronary angiography;  Surgeon: Jo Toney MD;  Location: [facility] CATH INVASIVE LOCATION;  Service: Cardiovascular   • CAROTID ENDARTERECTOMY Bilateral    • COLONOSCOPY  2008   • CORONARY ARTERY BYPASS GRAFT WITH AORTIC VALVE REPAIR/REPLACEMENT N/A 7/23/2018    Procedure: INTRAOPERATIVE ALEX, MIDLINE STERNOTOMY, CORONARY ARTERY BYPASS GRAFTING X 3 USING ENDOSCOPICALLY HARVESTED LEFT GREATER SAPHENOUS VEIN,  AORTIC VALVE REPLACEMENT USING 25MM LOPEZ II ULTRA PORCINE VALVE, PRP;  Surgeon: Pohng Posey MD;  Location: [facility];  Service: Cardiothoracic   • FOOT SURGERY Right 2010    5th digit removal   • FOOT SURGERY Left 2011    1 digit removed   • THORACENTESIS Right 11/21/2016   • THORACOSCOPY Right 5/8/2017    Procedure: BRONCHOSCOPY, RIGHT VAT,  TOTAL DECORTICATION RIGHT LUNG, PLEURAL BX, PLACEMENT SUBPLEURAL PAIN CAATHETERS X2;  Surgeon: Donald Orlando III, MD;  Location: [facility];  Service:    • TONSILECTOMY, ADENOIDECTOMY, BILATERAL MYRINGOTOMY AND TUBES         Therapy Treatment          Rehabilitation Treatment Summary     Row Name 08/01/18 1145             Treatment Time/Intention    Discipline occupational therapist  -MP      Subjective Information no complaints   nrsg approved OT to treat prior to therapy  -MP      Mode of Treatment individual therapy  -MP      Care Plan Review care plan/treatment goals reviewed  -MP      Total Minutes, Occupational Therapy Treatment 16  -MP      Patient Effort good  -MP      Existing Precautions/Restrictions fall;sternal  -MP      Recorded by [MP]  Octaviazahra Bravo, OTR 08/01/18 1227      Row Name 08/01/18 1145             Therapeutic Exercise    73284 - OT Therapeutic Exercise Minutes 16  -MP      Recorded by [MP] Octavia Bravo, OTR 08/01/18 1227      Row Name 08/01/18 1145             Upper Extremity Seated Therapeutic Exercise    Performed, Seated Upper Extremity (Therapeutic Exercise) shoulder flexion/extension;shoulder abduction/adduction;shoulder external/internal rotation;elbow flexion/extension;digit flexion/extension  -MP      Exercise Type, Seated Upper Extremity (Therapeutic Exercise) AROM (active range of motion)  -MP      Expected Outcomes, Seated Upper Extremity (Therapeutic Exercise) improve functional tolerance, household activity;improve functional tolerance, self-care activity;improve performance, BADLs  -MP      Recorded by [MP] Octavia Bravo, OTR 08/01/18 1227      Row Name 08/01/18 1145             Therapeutic Exercise    Upper Extremity Range of Motion (Therapeutic Exercise) shoulder flexion/extension, bilateral  -MP      Weight/Resistance (Therapeutic Exercise) manual resistance  -MP      Recorded by [MP] Octavia Bravo, OTR 08/01/18 1227      Row Name 08/01/18 1145             Positioning and Restraints    Pre-Treatment Position sitting in chair/recliner  -MP      Post Treatment Position chair  -MP      In Chair notified nsg;call light within reach  -MP      Recorded by [MP] Octavia Bravo, OTR 08/01/18 1227      Row Name 08/01/18 1145             Pain Scale: Numbers Pre/Post-Treatment    Pain Scale: Numbers, Pretreatment 0/10 - no pain  -MP      Pain Scale: Numbers, Post-Treatment 0/10 - no pain  -MP      Recorded by [MP] Octavia Bravo, OTR 08/01/18 1227      Row Name                Wound 07/23/18 1548 Other (See comments) chest incision    Wound - Properties Group Date first assessed: 07/23/18 [AE] Time first assessed: 1548 [AE] Side: Other (See comments) [AE] Location: chest [AE] Type: incision  [AE] Recorded by:  [AE] Kiley Raymundo RN 07/23/18 1548    Row Name                Wound 07/23/18 1548 Left leg incision    Wound - Properties Group Date first assessed: 07/23/18 [AE] Time first assessed: 1548 [AE] Side: Left [AE] Location: leg [AE] Type: incision [AE] Recorded by:  [AE] Kiley Raymundo RN 07/23/18 1548    Row Name 08/01/18 1145             Outcome Summary/Treatment Plan (OT)    Daily Summary of Progress (OT) progress toward functional goals as expected  -MP      Anticipated Discharge Disposition (OT) skilled nursing facility  -MP      Recorded by [MP] Octavia Bravo, OTR 08/01/18 1227        User Key  (r) = Recorded By, (t) = Taken By, (c) = Cosigned By    Initials Name Effective Dates Discipline    MP Octavia Bravo, OTR 06/08/18 -  OT    AE Kiley Raymundo RN 10/12/16 -  Nurse        Wound 07/23/18 1548 Other (See comments) chest incision (Active)   Dressing Appearance dry;intact;open to air;no drainage 8/1/2018  8:24 AM   Closure Approximated;Open to air;Sutures 8/1/2018  8:24 AM   Drainage Amount none 8/1/2018  8:24 AM   Dressing Care, Wound open to air 8/1/2018  8:24 AM       Wound 07/23/18 1548 Left leg incision (Active)   Dressing Appearance dry;intact;open to air;no drainage 8/1/2018  8:24 AM   Closure Liquid skin adhesive 8/1/2018  8:24 AM   Drainage Amount none 8/1/2018  8:24 AM   Dressing Care, Wound open to air 8/1/2018  4:55 AM         Occupational Therapy Education     Title: PT OT SLP Therapies (Done)     Topic: Occupational Therapy (Done)     Point: ADL training (Done)     Description: Instruct learner(s) on proper safety adaptation and remediation techniques during self care or transfers.   Instruct in proper use of assistive devices.   Learning Progress Summary     Learner Status Readiness Method Response Comment Documented by    Patient Done Eager E VU discussion regarding role of OT in rehab process...has steps in house and lives independently MP 08/01/18 1228     Done  Acceptance JOSE DE JESUS HARPER OT educ on OT role in therapeutic process and pt's POC  07/27/18 1359          Point: Precautions (Done)     Description: Instruct learner(s) on prescribed precautions during self-care and functional transfers.   Learning Progress Summary     Learner Status Readiness Method Response Comment Documented by    Patient Done Eager E VU discussion regarding role of OT in rehab process...has steps in house and lives independently  08/01/18 1228                      User Key     Initials Effective Dates Name Provider Type Discipline     06/08/18 -  Octavia Bravo OTR Occupational Therapist OT     05/03/18 -  Ernestina Bravo OT Occupational Therapist OT                OT Recommendation and Plan  Outcome Summary/Treatment Plan (OT)  Daily Summary of Progress (OT): progress toward functional goals as expected  Anticipated Discharge Disposition (OT): skilled nursing facility  Daily Summary of Progress (OT): progress toward functional goals as expected  Outcome Summary: Pt sitting in chair...good participation with BUE exercises including manual resistance. Rec rehab for return to indep living        Outcome Measures     Row Name 08/01/18 1200 07/31/18 1000 07/31/18 0800       How much help from another person do you currently need...    Turning from your back to your side while in flat bed without using bedrails?  -- 2  -MD  --    Moving from lying on back to sitting on the side of a flat bed without bedrails?  -- 2  -MD  --    Moving to and from a bed to a chair (including a wheelchair)?  -- 2  -MD  --    Standing up from a chair using your arms (e.g., wheelchair, bedside chair)?  -- 2  -MD  --    Climbing 3-5 steps with a railing?  -- 2  -MD  --    To walk in hospital room?  -- 3  -MD  --    AM-PAC 6 Clicks Score  -- 13  -MD  --       How much help from another is currently needed...    Putting on and taking off regular lower body clothing? 2  -MP  -- 2  -MR    Bathing (including washing,  rinsing, and drying) 2  -MP  -- 2  -MR    Toileting (which includes using toilet bed pan or urinal) 2  -MP  -- 2  -MR    Putting on and taking off regular upper body clothing 3  -MP  -- 3  -MR    Taking care of personal grooming (such as brushing teeth) 3  -MP  -- 3  -MR    Eating meals 4  -MP  -- 4  -MR    Score 16  -MP  -- 16  -MR       Functional Assessment    Outcome Measure Options AM-PAC 6 Clicks Daily Activity (OT)  -MP AM-PAC 6 Clicks Basic Mobility (PT)  -MD  --    Row Name 07/30/18 1200 07/30/18 0900          How much help from another person do you currently need...    Turning from your back to your side while in flat bed without using bedrails?  -- 2  -MD     Moving from lying on back to sitting on the side of a flat bed without bedrails?  -- 2  -MD     Moving to and from a bed to a chair (including a wheelchair)?  -- 2  -MD     Standing up from a chair using your arms (e.g., wheelchair, bedside chair)?  -- 2  -MD     Climbing 3-5 steps with a railing?  -- 2  -MD     To walk in hospital room?  -- 3  -MD     AM-PAC 6 Clicks Score  -- 13  -MD        How much help from another is currently needed...    Putting on and taking off regular lower body clothing? 2  -MR  --     Bathing (including washing, rinsing, and drying) 2  -MR  --     Toileting (which includes using toilet bed pan or urinal) 2  -MR  --     Putting on and taking off regular upper body clothing 3  -MR  --     Taking care of personal grooming (such as brushing teeth) 3  -MR  --     Eating meals 4  -MR  --     Score 16  -MR  --        Functional Assessment    Outcome Measure Options  -- AM-PAC 6 Clicks Basic Mobility (PT)  -MD       User Key  (r) = Recorded By, (t) = Taken By, (c) = Cosigned By    Initials Name Provider Type    AURELIO Bravo, OTR Occupational Therapist    MD Yoly Asher, PT Physical Therapist    MR Yoly Sanderson Yordan, OT Occupational Therapist           Time Calculation:         Time Calculation- OT     Row Name 08/01/18  1235 08/01/18 1145          Time Calculation- OT    OT Start Time 1144  -MP  --     OT Stop Time 1201  -MP  --     OT Time Calculation (min) 17 min  -MP  --     OT Received On 08/01/18  -MP  --        Timed Charges    68330 - OT Therapeutic Exercise Minutes  -- 16  -MP       User Key  (r) = Recorded By, (t) = Taken By, (c) = Cosigned By    Initials Name Provider Type     Octavia Bravo, OTR Occupational Therapist           Therapy Suggested Charges     Code   Minutes Charges    79532 (CPT®) Hc Ot Neuromusc Re Education Ea 15 Min      68917 (CPT®) Hc Ot Ther Proc Ea 15 Min 16 1    43388 (CPT®) Hc Ot Therapeutic Act Ea 15 Min      75212 (CPT®) Hc Ot Manual Therapy Ea 15 Min      91029 (CPT®) Hc Ot Iontophoresis Ea 15 Min      84429 (CPT®) Hc Ot Elec Stim Ea-Per 15 Min      41445 (CPT®) Hc Ot Ultrasound Ea 15 Min      52039 (CPT®) Hc Ot Self Care/Mgmt/Train Ea 15 Min      Total  16 1        Therapy Charges for Today     Code Description Service Date Service Provider Modifiers Qty    [id number] HC OT THER PROC EA 15 MIN 8/1/2018 Octavia Bravo, OTR GO 1               Octavia Bravo OTR  8/1/2018

## 2018-08-01 NOTE — PROGRESS NOTES
Continued Stay Note  [facility]     Patient Name: Rock Maciel Jr.  MRN: [id number]  Today's Date: 8/1/2018    Admit Date: 7/19/2018          Discharge Plan     Row Name 08/01/18 1023       Plan    Plan Oswald Larson precert obtained 7/31    Patient/Family in Agreement with Plan yes    Plan Comments Spoke with Azalia/ Rodriguez about patient not medically ready for DC, started IV amiodarone.  She will verifiy if precert is good for 48 hours and continue to follow.  Packet with report and fax on chart...........................Alison Dudley RN              Discharge Codes    No documentation.       Expected Discharge Date and Time     Expected Discharge Date Expected Discharge Time    Jul 30, 2018             Alison Dudley RN

## 2018-08-01 NOTE — PROGRESS NOTES
" LOS: 13 days   Patient Care Team:  Karen Red MD as PCP - General (Family Medicine)  Azam Banegas Jr., MD as Consulting Physician (Cardiology)    Chief Complaint: post op fu    Subjective:  Symptoms:  No shortness of breath.    Diet:  Adequate intake.  No nausea or vomiting.    Activity level: Returning to normal.    Pain:  He reports no pain.          Vital Signs  Temp:  [97.7 °F (36.5 °C)-98.9 °F (37.2 °C)] 98.4 °F (36.9 °C)  Heart Rate:  [] 97  Resp:  [16-19] 18  BP: (112-142)/(60-71) 112/62  Body mass index is 39.86 kg/m².    Intake/Output Summary (Last 24 hours) at 08/01/18 1017  Last data filed at 08/01/18 0759   Gross per 24 hour   Intake             1320 ml   Output             1100 ml   Net              220 ml     I/O this shift:  In: 480 [P.O.:480]  Out: -       1    07/30/18  0420 07/31/18  0300 08/01/18  0700   Weight: (!) 141 kg (310 lb 12.8 oz) (!) 140 kg (308 lb) (!) 137 kg (302 lb 1.6 oz)         Objective:  General Appearance:  Comfortable.    Vital signs: (most recent): Blood pressure 112/62, pulse 97, temperature 98.4 °F (36.9 °C), temperature source Oral, resp. rate 18, height 185.4 cm (73\"), weight (!) 137 kg (302 lb 1.6 oz), SpO2 93 %.    Output: Producing urine and producing stool.    Lungs:  Normal effort and normal respiratory rate.    Heart: Normal rate.  Regular rhythm.    Abdomen: Abdomen is soft and non-distended.    Neurological: Patient is alert and oriented to person, place and time.    Skin:  Warm and dry.  (Sternal incision well approximated without erythema, edema, or drainage. )            Results Review:        WBC WBC   Date Value Ref Range Status   08/01/2018 8.68 4.50 - 10.70 10*3/mm3 Final   07/30/2018 10.08 4.50 - 10.70 10*3/mm3 Final      HGB Hemoglobin   Date Value Ref Range Status   08/01/2018 9.5 (L) 13.7 - 17.6 g/dL Final   07/30/2018 9.0 (L) 13.7 - 17.6 g/dL Final      HCT Hematocrit   Date Value Ref Range Status   08/01/2018 29.9 (L) 40.4 - 52.2 % Final "   07/30/2018 27.6 (L) 40.4 - 52.2 % Final      Platelets Platelets   Date Value Ref Range Status   08/01/2018 210 140 - 500 10*3/mm3 Final   07/30/2018 177 140 - 500 10*3/mm3 Final        PT/INR:  No results found for: PROTIME/No results found for: INR    Sodium Sodium   Date Value Ref Range Status   08/01/2018 141 136 - 145 mmol/L Final   07/31/2018 139 136 - 145 mmol/L Final   07/30/2018 137 136 - 145 mmol/L Final      Potassium Potassium   Date Value Ref Range Status   08/01/2018 3.9 3.5 - 5.2 mmol/L Final   07/31/2018 3.9 3.5 - 5.2 mmol/L Final   07/30/2018 3.5 3.5 - 5.2 mmol/L Final      Chloride Chloride   Date Value Ref Range Status   08/01/2018 100 98 - 107 mmol/L Final   07/31/2018 99 98 - 107 mmol/L Final   07/30/2018 98 98 - 107 mmol/L Final      Bicarbonate CO2   Date Value Ref Range Status   08/01/2018 25.8 22.0 - 29.0 mmol/L Final   07/31/2018 26.3 22.0 - 29.0 mmol/L Final   07/30/2018 25.4 22.0 - 29.0 mmol/L Final      BUN BUN   Date Value Ref Range Status   08/01/2018 40 (H) 8 - 23 mg/dL Final   07/31/2018 43 (H) 8 - 23 mg/dL Final   07/30/2018 50 (H) 8 - 23 mg/dL Final      Creatinine Creatinine   Date Value Ref Range Status   08/01/2018 1.83 (H) 0.76 - 1.27 mg/dL Final   07/31/2018 1.66 (H) 0.76 - 1.27 mg/dL Final   07/30/2018 1.95 (H) 0.76 - 1.27 mg/dL Final      Calcium Calcium   Date Value Ref Range Status   08/01/2018 9.3 8.6 - 10.5 mg/dL Final   07/31/2018 8.6 8.6 - 10.5 mg/dL Final   07/30/2018 8.4 (L) 8.6 - 10.5 mg/dL Final      Magnesium Magnesium   Date Value Ref Range Status   08/01/2018 2.0 1.6 - 2.4 mg/dL Final   07/30/2018 2.1 1.6 - 2.4 mg/dL Final            aspirin 81 mg Oral Daily   atorvastatin 40 mg Oral Nightly   clopidogrel 75 mg Oral Daily   enoxaparin 40 mg Subcutaneous Daily   furosemide 40 mg Oral Daily   glipiZIDE 5 mg Oral QAM AC   insulin aspart 0-9 Units Subcutaneous 4x Daily With Meals & Nightly   iron polysaccharides 150 mg Oral Daily   levoFLOXacin 250 mg Oral Q24H    metFORMIN 500 mg Oral BID With Meals   metoprolol tartrate 50 mg Oral Q12H   mupirocin  Each Nare BID   pantoprazole 40 mg Oral Daily   potassium chloride 20 mEq Oral Daily   sennosides-docusate sodium 2 tablet Oral Nightly   tamsulosin 0.4 mg Oral Daily       amiodarone 1 mg/min Last Rate: 1 mg/min (08/01/18 0842)   Followed by     amiodarone 0.5 mg/min    sodium chloride 30 mL/hr            Patient Active Problem List   Diagnosis Code   • Abnormal ECG R94.31   • Arteriosclerosis of coronary artery I25.10   • Cardiac murmur R01.1   • Essential hypertension I10   • LAFB (left anterior fascicular block) I44.4   • Bundle branch block, right I45.10   • Neuropathic arthropathy M14.60   • Diabetes mellitus (CMS/HCC) E11.9   • Obstructive apnea G47.33   • Gain of weight R63.5   • Gout M10.9   • Class 1 obesity due to excess calories without serious comorbidity with body mass index (BMI) of 30.0 to 30.9 in adult E66.09, Z68.30   • Chronic venous insufficiency I87.2   • Nonrheumatic aortic valve stenosis I35.0   • Carotid stenosis, asymptomatic I65.29   • Bradycardia R00.1   • Hyperlipidemia E78.5   • Bilateral carotid artery disease (CMS/HCC) I77.9   • Abnormal cardiovascular stress test R94.39   • Renal insufficiency N28.9       Assessment & Plan    -Severe aortic stenosis, CAD s/p CABGx2/AVR(tissue)------POD #9 (Dr. Posey)   -Sinus bradycardia pre-op-----RBBB post op, PAF this am rate 110-120, watch QTc with antibiotic  -KILLIAN on CKD III (baseline 1.3-1.5)----creatinine up to 1.8 today, nephrology following   -HTN---controlled on current tx, ACE I on hold with increased creatinine after resumed  -HL----on statin  -DM II, HbA1C 5.8----home meds resumed, SSI coverage  -Hx gout  -SHASTA----CPAP hs  -Hx bilateral CEA------moderate restenosis, resumed plavix    -Remote MRSA foot wound  -hx right VATS 5/2017, persistent right hemidiaphragm elevation  -anemia, TCP----- expected ABL, received several blood products intra-op/post  op, Hb stable, on iron   -urinary retention---resolved, on flomax, urology following  -UTI----on levaquin    Amiodarone IV loading for a.fib this am, back in NSR now.  D/c chest tube sutures.  Precert approved for rehab, probable transfer tomorrow if heart rhythm stable and renal fxn stable on glucophage.     ZEINA Tapia  08/01/18  10:17 AM

## 2018-08-01 NOTE — PROGRESS NOTES
"Nephrology Progress Note    Jamil Stevens MD  [facility]  [phone]     LOS: 13 days    Patient Care Team:  Karen Red MD as PCP - General (Family Medicine)  Azam Banegas Jr., MD as Consulting Physician (Cardiology)        Subjective    Follow up CKD 3    Interval History:   Feels good, no dyspnea, or cp  Went into a fib last night  BP borderline low    Review of Systems:    As noted    Objective     Vital Sign Min/Max for last 24 hours  Temp  Min: 97.7 °F (36.5 °C)  Max: 98.9 °F (37.2 °C)   BP  Min: 112/62  Max: 142/68   Pulse  Min: 84  Max: 117   Resp  Min: 16  Max: 19   SpO2  Min: 93 %  Max: 97 %   No Data Recorded   Weight  Min: 137 kg (302 lb 1.6 oz)  Max: 137 kg (302 lb 1.6 oz)     Flowsheet Rows      First Filed Value   Admission Height  185.4 cm (73\") Documented at 07/19/2018 1350   Admission Weight   140 kg (307 lb 9.6 oz) Documented at 07/19/2018 1350          I/O this shift:  In: 480 [P.O.:480]  Out: -   I/O last 3 completed shifts:  In: 1560 [P.O.:1560]  Out: 1950 [Urine:1950]    Physical Exam:     Neck Supple  Chest CTA, no rales or rhonchi  Heart Irregularr  Abd soft, NT  Ext trace edema.  Skin Warm, no rash  Neuro-alert, No focal deficits      WBC WBC   Date Value Ref Range Status   08/01/2018 8.68 4.50 - 10.70 10*3/mm3 Final   07/30/2018 10.08 4.50 - 10.70 10*3/mm3 Final      HGB Hemoglobin   Date Value Ref Range Status   08/01/2018 9.5 (L) 13.7 - 17.6 g/dL Final   07/30/2018 9.0 (L) 13.7 - 17.6 g/dL Final      HCT Hematocrit   Date Value Ref Range Status   08/01/2018 29.9 (L) 40.4 - 52.2 % Final   07/30/2018 27.6 (L) 40.4 - 52.2 % Final      Platlets No results found for: LABPLAT   MCV MCV   Date Value Ref Range Status   08/01/2018 92.3 79.8 - 96.2 fL Final   07/30/2018 91.1 79.8 - 96.2 fL Final          Sodium Sodium   Date Value Ref Range Status   08/01/2018 141 136 - 145 mmol/L Final   07/31/2018 139 136 - 145 mmol/L Final   07/30/2018 137 136 - 145 mmol/L Final    "   Potassium Potassium   Date Value Ref Range Status   08/01/2018 3.9 3.5 - 5.2 mmol/L Final   07/31/2018 3.9 3.5 - 5.2 mmol/L Final   07/30/2018 3.5 3.5 - 5.2 mmol/L Final      Chloride Chloride   Date Value Ref Range Status   08/01/2018 100 98 - 107 mmol/L Final   07/31/2018 99 98 - 107 mmol/L Final   07/30/2018 98 98 - 107 mmol/L Final      CO2 CO2   Date Value Ref Range Status   08/01/2018 25.8 22.0 - 29.0 mmol/L Final   07/31/2018 26.3 22.0 - 29.0 mmol/L Final   07/30/2018 25.4 22.0 - 29.0 mmol/L Final      BUN BUN   Date Value Ref Range Status   08/01/2018 40 (H) 8 - 23 mg/dL Final   07/31/2018 43 (H) 8 - 23 mg/dL Final   07/30/2018 50 (H) 8 - 23 mg/dL Final      Creatinine Creatinine   Date Value Ref Range Status   08/01/2018 1.83 (H) 0.76 - 1.27 mg/dL Final   07/31/2018 1.66 (H) 0.76 - 1.27 mg/dL Final   07/30/2018 1.95 (H) 0.76 - 1.27 mg/dL Final      Calcium Calcium   Date Value Ref Range Status   08/01/2018 9.3 8.6 - 10.5 mg/dL Final   07/31/2018 8.6 8.6 - 10.5 mg/dL Final   07/30/2018 8.4 (L) 8.6 - 10.5 mg/dL Final      PO4 No results found for: CAPO4   Albumin Albumin   Date Value Ref Range Status   08/01/2018 3.20 (L) 3.50 - 5.20 g/dL Final   07/31/2018 3.20 (L) 3.50 - 5.20 g/dL Final   07/30/2018 3.20 (L) 3.50 - 5.20 g/dL Final      Magnesium Magnesium   Date Value Ref Range Status   08/01/2018 2.0 1.6 - 2.4 mg/dL Final   07/30/2018 2.1 1.6 - 2.4 mg/dL Final      Uric Acid No results found for: URICACID        Results Review:     I reviewed the patient's new clinical results.      amiodarone 150 mg Intravenous Once   aspirin 81 mg Oral Daily   atorvastatin 40 mg Oral Nightly   clopidogrel 75 mg Oral Daily   enoxaparin 40 mg Subcutaneous Daily   furosemide 40 mg Oral Daily   glipiZIDE 5 mg Oral QAM AC   insulin aspart 0-9 Units Subcutaneous 4x Daily With Meals & Nightly   iron polysaccharides 150 mg Oral Daily   levoFLOXacin 250 mg Oral Q24H   metFORMIN 500 mg Oral BID With Meals   metoprolol tartrate  50 mg Oral Q12H   mupirocin  Each Nare BID   pantoprazole 40 mg Oral Daily   potassium chloride 20 mEq Oral Daily   sennosides-docusate sodium 2 tablet Oral Nightly   tamsulosin 0.4 mg Oral Daily       amiodarone 1 mg/min   Followed by    amiodarone 0.5 mg/min   sodium chloride 30 mL/hr       Medication Review:     Assessment/Plan     Active Problems:    Arteriosclerosis of coronary artery    Essential hypertension    Class 1 obesity due to excess calories without serious comorbidity with body mass index (BMI) of 30.0 to 30.9 in adult    Nonrheumatic aortic valve stenosis    Hyperlipidemia    Abnormal cardiovascular stress test    Renal insufficiency    KILLIAN--cr 1.6-->2.2, suspect pre-renal/hemodynamic, with BP in the low 100s, ACEi, diuretics. Cr better, non oliguric. PVR 90 mls. UCx pos for proteus.  Metoprolol decreased, and lisinopril held.  He had been on lisinopril pre-op, and tolerated, so doubt significant RONALD. Cr better.     CKD3---  Patient has chronic kidney disease stage 3, most likely due to hypertensive nephrosclerosis and or early diabetic glomerulosclerosis. His cr has been up, but stable for almost a year. Baseline cr 1.6-1.8. UA negative except for miimal protein. UA left renal cyst/indeterminate.     HTN--metoprolol and lisinopril  Left renal complex cyst--US indeterminate, will get MRI at somepint in future  HLD--statins  Hyperkalemia--resolved. ACEi/triamterene--held  DM--BS high, will resume his metformin  Severe AS and CAD--s/p CABG/AVR 7/23/18  Urinary retention--flomax/ Urology eval appreciated. Voiding without issues.   Anemia--ABL, transfused 7/25, 2 units 7/26; TSAT 8, ferritin 68. Hb stable. On PO iron. Hb stable   UTI--UCx growing Proteus/ sensitive to levaquin, continue levaquin x 5 days  A fib--to start amio    BP lowish, cr slightly up, but suspect pre-renal/ would hold off lisinopril for now, as BP may not permit, ? Inc metoprolol/ defer to cards for rate control  Volume/lytes  ok  Stop levaquin tomorrow    Jamil Stevens MD  [facility] [place], [place]  [phone]   [phone] Fx    08/01/18  8:26 AM    Time:

## 2018-08-01 NOTE — SIGNIFICANT NOTE
08/01/18 0856   Rehab Treatment   Discipline physical therapist   Reason Treatment Not Performed unable to treat, medical status change  (RN asked PT to hold this am due to med status.  PT will follow up this pm.)   Recommendation   PT - Next Appointment 08/01/18

## 2018-08-01 NOTE — THERAPY TREATMENT NOTE
Acute Care - Physical Therapy Treatment Note  [facility]     Patient Name: Rock Maciel Jr.  : 1945  MRN: [id number]  Today's Date: 2018  Onset of Illness/Injury or Date of Surgery: 18     Referring Physician: Taty    Admit Date: 2018    Visit Dx:    ICD-10-CM ICD-9-CM   1. Arteriosclerosis of coronary artery I25.10 414.00   2. Nonrheumatic aortic valve stenosis I35.0 424.1   3. Decreased mobility R26.89 781.99   4. Essential hypertension I10 401.9   5. S/P CABG x 2 Z95.1 V45.81   6. S/P AVR (aortic valve replacement) Z95.2 V43.3   7. Asymptomatic bilateral carotid artery stenosis I65.23 433.10     433.30   8. Diabetes mellitus due to underlying condition with hyperosmolarity without coma, without long-term current use of insulin (CMS/HCC) E08.00 249.20     Patient Active Problem List   Diagnosis   • Abnormal ECG   • Arteriosclerosis of coronary artery   • Cardiac murmur   • Essential hypertension   • LAFB (left anterior fascicular block)   • Bundle branch block, right   • Neuropathic arthropathy   • Diabetes mellitus (CMS/HCC)   • Obstructive apnea   • Gain of weight   • Gout   • Class 1 obesity due to excess calories without serious comorbidity with body mass index (BMI) of 30.0 to 30.9 in adult   • Chronic venous insufficiency   • Nonrheumatic aortic valve stenosis   • Carotid stenosis, asymptomatic   • Bradycardia   • Hyperlipidemia   • Bilateral carotid artery disease (CMS/HCC)   • Abnormal cardiovascular stress test   • Renal insufficiency       Therapy Treatment          Rehabilitation Treatment Summary     Row Name 18 1500 18 1145          Treatment Time/Intention    Discipline physical therapist  -MD occupational therapist  -MP     Document Type therapy note (daily note)  -MD  --     Subjective Information no complaints  -MD no complaints   nrsg approved OT to treat prior to therapy  -MP     Mode of Treatment physical therapy  -MD individual therapy  -MP      Patient/Family Observations Pt sitting up in chair showing no signs of acute distress  -MD  --     Care Plan Review  -- care plan/treatment goals reviewed  -MP     Therapy Frequency (PT Clinical Impression) daily  -MD  --     Total Minutes, Occupational Therapy Treatment  -- 16  -MP     Patient Effort good  -MD good  -MP     Existing Precautions/Restrictions fall;sternal  -MD fall;sternal  -MP     Recorded by [MD] Yoly Asher, PT 08/01/18 1509 [MP] Octavia Bravo, OTR 08/01/18 1227     Row Name 08/01/18 1500             Cognitive Assessment/Intervention- PT/OT    Orientation Status (Cognition) oriented x 4  -MD      Follows Commands (Cognition) WNL  -MD      Recorded by [MD] Yoly Asher, PT 08/01/18 1509      Row Name 08/01/18 1500             Sit-Stand Transfer    Sit-Stand [place] (Transfers) verbal cues;moderate assist (50% patient effort);2 person assist  -MD      Recorded by [MD] Yoly Asher, PT 08/01/18 1516      Row Name 08/01/18 1500             Stand-Sit Transfer    Stand-Sit [place] (Transfers) verbal cues;moderate assist (50% patient effort);minimum assist (75% patient effort);2 person assist  -MD      Recorded by [MD] Yoly Asher, PT 08/01/18 1516      Row Name 08/01/18 1500             Gait/Stairs Assessment/Training    [place] Level (Gait) verbal cues;minimum assist (75% patient effort);2 person assist  -MD      Distance in Feet (Gait) 80  -MD      Deviations/Abnormal Patterns (Gait) crispin decreased;stride length decreased  -MD      Bilateral Gait Deviations weight shift ability decreased;forward flexed posture  -MD      Recorded by [MD] Yoly Asher, PT 08/01/18 1516      Row Name 08/01/18 1145             Therapeutic Exercise    52502 - OT Therapeutic Exercise Minutes 16  -MP      Recorded by [MP] Octavia Bravo, OTR 08/01/18 1227      Row Name 08/01/18 1145             Upper Extremity Seated Therapeutic Exercise    Performed, Seated Upper Extremity (Therapeutic Exercise) shoulder  flexion/extension;shoulder abduction/adduction;shoulder external/internal rotation;elbow flexion/extension;digit flexion/extension  -MP      Exercise Type, Seated Upper Extremity (Therapeutic Exercise) AROM (active range of motion)  -MP      Expected Outcomes, Seated Upper Extremity (Therapeutic Exercise) improve functional tolerance, household activity;improve functional tolerance, self-care activity;improve performance, BADLs  -MP      Recorded by [MP] Octavia Bravo, OTR 08/01/18 1227      Row Name 08/01/18 1145             Therapeutic Exercise    Upper Extremity Range of Motion (Therapeutic Exercise) shoulder flexion/extension, bilateral  -MP      Weight/Resistance (Therapeutic Exercise) manual resistance  -MP      Recorded by [MP] Octavia Bravo, OTR 08/01/18 1227      Row Name 08/01/18 1500 08/01/18 1145          Positioning and Restraints    Pre-Treatment Position sitting in chair/recliner  -MD sitting in chair/recliner  -MP     Post Treatment Position chair  -MD chair  -MP     In Chair sitting;call light within reach  -MD notified nsg;call light within reach  -MP     Recorded by [MD] Yoly Asher, PT 08/01/18 1519 [MP] Octavia Bravo, OTR 08/01/18 1227     Row Name 08/01/18 1500 08/01/18 1145          Pain Scale: Numbers Pre/Post-Treatment    Pain Scale: Numbers, Pretreatment 0/10 - no pain  -MD 0/10 - no pain  -MP     Pain Scale: Numbers, Post-Treatment  -- 0/10 - no pain  -MP     Recorded by [MD] Yoly Asher, PT 08/01/18 1519 [MP] Octavia Bravo, OTR 08/01/18 1227     Row Name                Wound 07/23/18 1548 Other (See comments) chest incision    Wound - Properties Group Date first assessed: 07/23/18 [AE] Time first assessed: 1548 [AE] Side: Other (See comments) [AE] Location: chest [AE] Type: incision [AE] Recorded by:  [AE] Kiley Raymundo RN 07/23/18 1548    Row Name                Wound 07/23/18 1548 Left leg incision    Wound - Properties Group Date first assessed: 07/23/18 [AE] Time  first assessed: 1548 [AE] Side: Left [AE] Location: leg [AE] Type: incision [AE] Recorded by:  [AE] Kiley Raymundo RN 07/23/18 1548    Row Name 08/01/18 1145             Outcome Summary/Treatment Plan (OT)    Daily Summary of Progress (OT) progress toward functional goals as expected  -MP      Anticipated Discharge Disposition (OT) skilled nursing facility  -MP      Recorded by [MP] Octavia Bravo, OTR 08/01/18 1227        User Key  (r) = Recorded By, (t) = Taken By, (c) = Cosigned By    Initials Name Effective Dates Discipline    MP Octavia Bravo, OTR 06/08/18 -  OT    MD Yoly Asher, PT 04/03/18 -  PT    AE Kiley Raymundo RN 10/12/16 -  Nurse          Wound 07/23/18 1548 Other (See comments) chest incision (Active)   Dressing Appearance dry;intact;open to air;no drainage 8/1/2018  8:24 AM   Closure Approximated;Open to air;Sutures 8/1/2018  8:24 AM   Drainage Amount none 8/1/2018  8:24 AM   Dressing Care, Wound open to air 8/1/2018  8:24 AM       Wound 07/23/18 1548 Left leg incision (Active)   Dressing Appearance dry;intact;open to air;no drainage 8/1/2018  8:24 AM   Closure Liquid skin adhesive 8/1/2018  8:24 AM   Drainage Amount none 8/1/2018  8:24 AM   Dressing Care, Wound open to air 8/1/2018  4:55 AM             Physical Therapy Education     Title: PT OT SLP Therapies (Done)     Topic: Physical Therapy (Done)     Point: Mobility training (Done)    Learning Progress Summary     Learner Status Readiness Method Response Comment Documented by    Patient Done Acceptance E,TB VU  JG 07/30/18 0226     Done Acceptance E VU   07/29/18 0944     Done Acceptance E VU   07/28/18 1136     Done Acceptance TB,MEREDITH ALCALANR   07/25/18 1207     Done Acceptance E,TB,D CARI,NR   07/24/18 0910          Point: Home exercise program (Done)    Learning Progress Summary     Learner Status Readiness Method Response Comment Documented by    Patient Done Acceptance ETB CARI  JG 07/30/18 0226     Done Acceptance E VU   07/29/18  0944     Done Acceptance E VU   07/28/18 1136     Done Acceptance TB,E VU,NR  EE 07/25/18 1207     Done Acceptance E,TB,D VU,NR  EE 07/24/18 0910          Point: Body mechanics (Done)    Learning Progress Summary     Learner Status Readiness Method Response Comment Documented by    Patient Done Acceptance E,TB VU  JG 07/30/18 0226     Done Acceptance E VU   07/29/18 0944     Done Acceptance E VU   07/28/18 1136     Done Acceptance TB,E VU,NR  EE 07/25/18 1207     Done Acceptance E,TB,D VU,NR  EE 07/24/18 0910          Point: Precautions (Done)    Learning Progress Summary     Learner Status Readiness Method Response Comment Documented by    Patient Done Acceptance E VU  MD 08/01/18 1519     Done Acceptance E VU  MD 08/01/18 1509     Done Acceptance E VU  MD 07/31/18 1030     Done Acceptance E VU  MD 07/30/18 0955     Done Acceptance E,TB VU   07/30/18 0226     Done Acceptance E VU   07/29/18 0944     Done Acceptance E VU   07/28/18 1136     Done Acceptance E VU  MD 07/27/18 1023     Done Acceptance E VU  MD 07/26/18 0950     Done Acceptance TB,E VU,NR   07/25/18 1207     Done Acceptance E,TB,D VU,NR   07/24/18 0910                      User Key     Initials Effective Dates Name Provider Type Discipline     04/03/18 -  Madelin Gil, PT Physical Therapist PT    MD 04/03/18 -  Yoly Asher, PT Physical Therapist PT     04/03/18 -  Pavan Murillo, PT Physical Therapist PT     02/15/18 -  Jennifer Diaz, RN Registered Nurse Nurse                    PT Recommendation and Plan  Therapy Frequency (PT Clinical Impression): daily  Plan of Care Reviewed With: patient  Progress: improving  Outcome Summary: Pt continues to require assist of 2 for sit-stand transfers and is dependent with donning and doffing his shoes at this time.  Due to the above pt will benifit from rehab following d/c to allow pt to return to safe and independent functional mobility.            Outcome Measures     Row Name 08/01/18 1500  08/01/18 1200 07/31/18 1000       How much help from another person do you currently need...    Turning from your back to your side while in flat bed without using bedrails? 2  -MD  -- 2  -MD    Moving from lying on back to sitting on the side of a flat bed without bedrails? 2  -MD  -- 2  -MD    Moving to and from a bed to a chair (including a wheelchair)? 2  -MD  -- 2  -MD    Standing up from a chair using your arms (e.g., wheelchair, bedside chair)? 2  -MD  -- 2  -MD    Climbing 3-5 steps with a railing? 2  -MD  -- 2  -MD    To walk in hospital room? 3  -MD  -- 3  -MD    -[facility] 6 Clicks Score 13  -MD  -- 13  -MD       How much help from another is currently needed...    Putting on and taking off regular lower body clothing?  -- 2  -MP  --    Bathing (including washing, rinsing, and drying)  -- 2  -MP  --    Toileting (which includes using toilet bed pan or urinal)  -- 2  -MP  --    Putting on and taking off regular upper body clothing  -- 3  -MP  --    Taking care of personal grooming (such as brushing teeth)  -- 3  -MP  --    Eating meals  -- 4  -MP  --    Score  -- 16  -MP  --       Functional Assessment    Outcome Measure Options -[facility] 6 Clicks Basic Mobility (PT)  -MD [facility] 6 Clicks Daily Activity (OT)  -MP [facility] 6 Clicks Basic Mobility (PT)  -MD    Row Name 07/31/18 0800 07/30/18 1200 07/30/18 0900       How much help from another person do you currently need...    Turning from your back to your side while in flat bed without using bedrails?  --  -- 2  -MD    Moving from lying on back to sitting on the side of a flat bed without bedrails?  --  -- 2  -MD    Moving to and from a bed to a chair (including a wheelchair)?  --  -- 2  -MD    Standing up from a chair using your arms (e.g., wheelchair, bedside chair)?  --  -- 2  -MD    Climbing 3-5 steps with a railing?  --  -- 2  -MD    To walk in hospital room?  --  -- 3  -MD    AM-PAC 6 Clicks Score  --  -- 13  -MD       How much help from another is currently  needed...    Putting on and taking off regular lower body clothing? 2  -MR 2  -MR  --    Bathing (including washing, rinsing, and drying) 2  -MR 2  -MR  --    Toileting (which includes using toilet bed pan or urinal) 2  -MR 2  -MR  --    Putting on and taking off regular upper body clothing 3  -MR 3  -MR  --    Taking care of personal grooming (such as brushing teeth) 3  -MR 3  -MR  --    Eating meals 4  -MR 4  -MR  --    Score 16  -MR 16  -MR  --       Functional Assessment    Outcome Measure Options  --  -- AM-PAC 6 Clicks Basic Mobility (PT)  -MD      User Key  (r) = Recorded By, (t) = Taken By, (c) = Cosigned By    Initials Name Provider Type    AURELIO Bravo, OTR Occupational Therapist    MD Yoly Asher, PT Physical Therapist    MR Yoly Sanderson Yordan, OT Occupational Therapist           Time Calculation:         PT Charges     Row Name 08/01/18 1508 08/01/18 0856          Time Calculation    Start Time 1506  -MD  --     Stop Time 1520  -MD  --     Time Calculation (min) 14 min  -MD  --     PT Received On 08/01/18  -MD  --     PT - Next Appointment 08/02/18  -MD 08/01/18  -MD     PT Goal Re-Cert Due Date 08/03/18  -MD  --       User Key  (r) = Recorded By, (t) = Taken By, (c) = Cosigned By    Initials Name Provider Type    MD Yoly Asher, PT Physical Therapist        Therapy Suggested Charges     Code   Minutes Charges    None           Therapy Charges for Today     Code Description Service Date Service Provider Modifiers Qty    82850038702 HC PT THER PROC EA 15 MIN 7/31/2018 Yoly Asher, PT GP 1    68843351530 HC PT THER SUPP EA 15 MIN 7/31/2018 Yoly Asher, PT GP 1    81181147046 HC PT THER PROC EA 15 MIN 8/1/2018 Yoly Asher, PT GP 1    17252581164 HC PT THER SUPP EA 15 MIN 8/1/2018 Yoly Asher, PT GP 1          PT G-Codes  Outcome Measure Options: AM-PAC 6 Clicks Basic Mobility (PT)    Yoly Asher, PT  8/1/2018

## 2018-08-01 NOTE — PLAN OF CARE
Problem: Patient Care Overview  Goal: Plan of Care Review   08/01/18 4488   Coping/Psychosocial   Plan of Care Reviewed With patient   OTHER   Outcome Summary Pt continues to require assist of 2 for sit-stand transfers and is dependent with donning and doffing his shoes at this time. Due to the above pt will benifit from rehab following d/c to allow pt to return to safe and independent functional mobility.

## 2018-08-01 NOTE — PLAN OF CARE
Problem: Patient Care Overview  Goal: Plan of Care Review  Outcome: Ongoing (interventions implemented as appropriate)   08/01/18 1230   OTHER   Outcome Summary OT...Pt sitting in chair...good participation with BUE exercises including manual resistance. Rec rehab for return to indep living

## 2018-08-01 NOTE — PROGRESS NOTES
Cardiology Progress Note    Patient Identification:  Name: Rock Maciel Jr.  Age: 72 y.o.  Sex: male  :  1945  MRN: [id number]                 Follow Up / Chief Complaint: Unstable angina, abnormal stress test, Multivessel CAD & severe AS    Interval History:  72-year-old white male admitted for diagnostic heart catheterization after abnormal stress test.  Known CKD requiring renal clearance.  Cath showed multivessel, severe AS &CAD => porcine AVR & CABG      Subjective:   Denies palpitations or dizziness. Denies SOA or SIMONS      Objective:  Went into afib rvr last pm  Currently afib with vr 90's-110     , QTc 511 (old RBBB and inc LBBB)  -120's / 60's  Afeb    BUN 40 (50) , Cr 1.83 (1.95)    K 3.9        Past Medical History:  Past Medical History:   Diagnosis Date   • Allergic rhinitis    • Bronchitis    • Coronary artery disease    • Diabetes mellitus (CMS/[facility])    • DM (diabetes mellitus) (CMS/[facility])    • Encounter for annual health examination 2014    Annual Health Assessment   • Gout    • Hiatal hernia    • History of MRSA infection     RIGHT FOOT    • Hyperlipidemia    • Hypertension    • Murmur    • Pneumothorax on right    • Sleep apnea     pt wears CPAP at night   • Wellness examination 2015    Annual Wellness Visit     Past Surgical History:  Past Surgical History:   Procedure Laterality Date   • ARTERY SURGERY Bilateral     carotid   • CARDIAC CATHETERIZATION N/A 2018    Procedure: Left Heart Cath;  Surgeon: Jo Toney MD;  Location: [facility] INVASIVE LOCATION;  Service: Cardiovascular   • CARDIAC CATHETERIZATION N/A 2018    Procedure: Coronary angiography;  Surgeon: Jo Toney MD;  Location: [facility] CATH INVASIVE LOCATION;  Service: Cardiovascular   • CAROTID ENDARTERECTOMY Bilateral    • COLONOSCOPY     • CORONARY ARTERY BYPASS GRAFT WITH AORTIC VALVE REPAIR/REPLACEMENT N/A 2018    Procedure: INTRAOPERATIVE ALEX,  MIDLINE STERNOTOMY, CORONARY ARTERY BYPASS GRAFTING X 3 USING ENDOSCOPICALLY HARVESTED LEFT GREATER SAPHENOUS VEIN,  AORTIC VALVE REPLACEMENT USING 25MM LOPEZ II ULTRA PORCINE VALVE, PRP;  Surgeon: Phong Posey MD;  Location: [facility] OR;  Service: Cardiothoracic   • FOOT SURGERY Right 2010    5th digit removal   • FOOT SURGERY Left 2011    1 digit removed   • THORACENTESIS Right 11/21/2016   • THORACOSCOPY Right 5/8/2017    Procedure: BRONCHOSCOPY, RIGHT VAT,  TOTAL DECORTICATION RIGHT LUNG, PLEURAL BX, PLACEMENT SUBPLEURAL PAIN CAATHETERS X2;  Surgeon: Donald Orlando III, MD;  Location: [facility];  Service:    • TONSILECTOMY, ADENOIDECTOMY, BILATERAL MYRINGOTOMY AND TUBES          Social History:   Social History   Substance Use Topics   • Smoking status: Never Smoker   • Smokeless tobacco: Never Used   • Alcohol use Yes      Comment: socially      Family History:  Family History   Problem Relation Age of Onset   • Hypertension Father           Allergies:  Allergies   Allergen Reactions   • Ceclor [Cefaclor] Rash     Scheduled Meds:    amiodarone 150 mg Once   aspirin 81 mg Daily   atorvastatin 40 mg Nightly   clopidogrel 75 mg Daily   enoxaparin 40 mg Daily   furosemide 40 mg Daily   glipiZIDE 5 mg QAM AC   insulin aspart 0-9 Units 4x Daily With Meals & Nightly   iron polysaccharides 150 mg Daily   levoFLOXacin 250 mg Q24H   metFORMIN 500 mg BID With Meals   metoprolol tartrate 50 mg Q12H   mupirocin  BID   pantoprazole 40 mg Daily   potassium chloride 20 mEq Daily   sennosides-docusate sodium 2 tablet Nightly   tamsulosin 0.4 mg Daily           INTAKE AND OUTPUT:    Intake/Output Summary (Last 24 hours) at 08/01/18 0751  Last data filed at 07/31/18 2216   Gross per 24 hour   Intake             1320 ml   Output              700 ml   Net              620 ml       Review of Systems:   GI: nausea or abdominal pain  Cardiac:  No palpitations or dizziness  Pulmonary: no shortness of breath or  cough    Constitutional:  Temp:  [97.7 °F (36.5 °C)-98.9 °F (37.2 °C)] 98.4 °F (36.9 °C)  Heart Rate:  [] 97  Resp:  [16-19] 18  BP: (112-142)/(57-71) 112/62   sp02 94% on RA    Physical Exam:  General: Awake, alert, sitting up at bedside Appears in no acute distress  Eyes: PERTL,  HEENT: Oral mucosa moist, pink, no cyanosis  Respiratory: Respirations regular and unlabored at rest on room air . BBS with air entry in all fields. No crackles or wheezes auscultated.  Midsternal incision and chest tube sites with well approximated edges.  No oozing or erythema  Cardiovascular: S1S2 irregular rate and rhythm.  No rub or gallop.  Trace-1+ L>R pretibial edema. .  Gastrointestinal: Abdomen soft,  non tender. + bowel sounds   Musculoskeletal: MARSHALL x4. No abnormal movements  Extremities: SVG incision sites with well approximated edges no oozing or erythema.  No digital or cyanosis  Skin: Color pink. Skin warm and dry to touch.   Neuro: AAO x 3 CN II-XII grossly intact      Cardiographics  Telemetry: afib, vr 90's-110      EKG 8-1-2018:      Echocardiogram:   · Left atrial cavity size is moderately dilated.  · Mild mitral valve regurgitation is present  · Mild tricuspid valve regurgitation is present.  · Calculated EF = 68%.  · There is no evidence of pericardial effusion.  · There is calcification of the aortic valve.  · Mild to moderate aortic valve stenosis is present.    Lab Review   Results from last 7 days  Lab Units 08/01/18  0326   SODIUM mmol/L 141   POTASSIUM mmol/L 3.9   BUN mg/dL 40*   CREATININE mg/dL 1.83*   CALCIUM mg/dL 9.3     Results from last 7 days  Lab Units 08/01/18  0326 07/30/18  0340 07/27/18  0351   WBC 10*3/mm3 8.68 10.08 6.94   HEMOGLOBIN g/dL 9.5* 9.0* 9.8*   HEMATOCRIT % 29.9* 27.6* 29.4*   PLATELETS 10*3/mm3 210 177 142       Assessment:  - multivessel CAD  - Mod-severe non-rheumatic aortic stenosis  - post op afib rvr  - HTN  - EF 65-70%, mild MR/TR  - CKD  - UTI  - Bilateral carotid artery  "disease  - Dyslipidemia  - DM  - SHASTA        Plan:  - multivessel CAD -  7/23  2v CABG. On aspirin, beta blocker, statin and Lasix.  Diuretic management as per renal    - Mod-severe non-rheumatic aortic stenosis -  7/23  porcine AVR (no ACE-I, ARB until cleared by nephrology due to acute on CKD)    - post op afib rvr - IV Amio per protocol. K wnl. Mg pending    - HTN -   112-120's / 60's  (in afib) on beta blocker, Lasix  Llisinopril on hold d/t worsening creatine. Will resume when cleared by nephrology     - Dyslipidemia -  statin resumed      Start IV Amio per protocol. Monitor QTc. Check Mg Diuretic management as per renal.  On Levaquin for UTI.       Labs/tests ordered: Mg, TFTs, EKG, medication adjustment    )8/1/2018  MD EMBER Roberson Dragon/Transcription:   \"Dictated utilizing Dragon dictation\".     "

## 2018-08-02 VITALS
HEIGHT: 73 IN | HEART RATE: 76 BPM | DIASTOLIC BLOOD PRESSURE: 62 MMHG | TEMPERATURE: 97.7 F | OXYGEN SATURATION: 95 % | BODY MASS INDEX: 39.89 KG/M2 | WEIGHT: 301 LBS | SYSTOLIC BLOOD PRESSURE: 132 MMHG | RESPIRATION RATE: 17 BRPM

## 2018-08-02 LAB
ALBUMIN SERPL-MCNC: 3.4 G/DL (ref 3.5–5.2)
ANION GAP SERPL CALCULATED.3IONS-SCNC: 13.6 MMOL/L
BUN BLD-MCNC: 42 MG/DL (ref 8–23)
BUN/CREAT SERPL: 23.2 (ref 7–25)
CALCIUM SPEC-SCNC: 8.6 MG/DL (ref 8.6–10.5)
CHLORIDE SERPL-SCNC: 96 MMOL/L (ref 98–107)
CO2 SERPL-SCNC: 28.4 MMOL/L (ref 22–29)
CREAT BLD-MCNC: 1.81 MG/DL (ref 0.76–1.27)
GFR SERPL CREATININE-BSD FRML MDRD: 37 ML/MIN/1.73
GLUCOSE BLD-MCNC: 113 MG/DL (ref 65–99)
GLUCOSE BLDC GLUCOMTR-MCNC: 146 MG/DL (ref 70–130)
GLUCOSE BLDC GLUCOMTR-MCNC: 299 MG/DL (ref 70–130)
PHOSPHATE SERPL-MCNC: 4.3 MG/DL (ref 2.5–4.5)
POTASSIUM BLD-SCNC: 3.7 MMOL/L (ref 3.5–5.2)
SODIUM BLD-SCNC: 138 MMOL/L (ref 136–145)

## 2018-08-02 PROCEDURE — 93005 ELECTROCARDIOGRAM TRACING: CPT | Performed by: NURSE PRACTITIONER

## 2018-08-02 PROCEDURE — 80069 RENAL FUNCTION PANEL: CPT | Performed by: INTERNAL MEDICINE

## 2018-08-02 PROCEDURE — 93010 ELECTROCARDIOGRAM REPORT: CPT | Performed by: INTERNAL MEDICINE

## 2018-08-02 PROCEDURE — 99238 HOSP IP/OBS DSCHRG MGMT 30/<: CPT | Performed by: NURSE PRACTITIONER

## 2018-08-02 PROCEDURE — 82962 GLUCOSE BLOOD TEST: CPT

## 2018-08-02 PROCEDURE — 25010000002 AMIODARONE IN DEXTROSE 5% 360-4.14 MG/200ML-% SOLUTION: Performed by: NURSE PRACTITIONER

## 2018-08-02 PROCEDURE — 63710000001 INSULIN ASPART PER 5 UNITS: Performed by: PHYSICIAN ASSISTANT

## 2018-08-02 RX ORDER — TAMSULOSIN HYDROCHLORIDE 0.4 MG/1
0.4 CAPSULE ORAL DAILY
Qty: 14 CAPSULE | Refills: 0 | Status: SHIPPED | OUTPATIENT
Start: 2018-08-02 | End: 2018-09-10 | Stop reason: ALTCHOICE

## 2018-08-02 RX ORDER — AMIODARONE HYDROCHLORIDE 200 MG/1
200 TABLET ORAL EVERY 12 HOURS SCHEDULED
Qty: 10 TABLET | Refills: 0 | Status: SHIPPED | OUTPATIENT
Start: 2018-08-02 | End: 2018-08-07

## 2018-08-02 RX ORDER — AMIODARONE HYDROCHLORIDE 200 MG/1
200 TABLET ORAL EVERY 12 HOURS SCHEDULED
Status: DISCONTINUED | OUTPATIENT
Start: 2018-08-02 | End: 2018-08-02 | Stop reason: HOSPADM

## 2018-08-02 RX ORDER — AMIODARONE HYDROCHLORIDE 200 MG/1
200 TABLET ORAL DAILY
Qty: 30 TABLET | Refills: 1 | Status: SHIPPED | OUTPATIENT
Start: 2018-08-07 | End: 2018-09-10 | Stop reason: ALTCHOICE

## 2018-08-02 RX ORDER — ACETAMINOPHEN 325 MG/1
650 TABLET ORAL EVERY 4 HOURS PRN
Start: 2018-08-02 | End: 2019-04-11

## 2018-08-02 RX ORDER — POTASSIUM CHLORIDE 750 MG/1
20 CAPSULE, EXTENDED RELEASE ORAL DAILY
Qty: 30 CAPSULE | Refills: 1 | Status: SHIPPED | OUTPATIENT
Start: 2018-08-02 | End: 2018-09-10 | Stop reason: ALTCHOICE

## 2018-08-02 RX ORDER — BISACODYL 5 MG/1
10 TABLET, DELAYED RELEASE ORAL DAILY PRN
Start: 2018-08-02 | End: 2018-09-10 | Stop reason: ALTCHOICE

## 2018-08-02 RX ORDER — IRON POLYSACCHARIDE COMPLEX 150 MG
150 CAPSULE ORAL DAILY
Qty: 30 CAPSULE | Refills: 0 | Status: SHIPPED | OUTPATIENT
Start: 2018-08-02 | End: 2018-09-10 | Stop reason: ALTCHOICE

## 2018-08-02 RX ORDER — ASPIRIN 81 MG/1
81 TABLET ORAL DAILY
Start: 2018-08-02 | End: 2018-09-28

## 2018-08-02 RX ORDER — FUROSEMIDE 40 MG/1
40 TABLET ORAL DAILY
Qty: 30 TABLET | Refills: 1 | Status: SHIPPED | OUTPATIENT
Start: 2018-08-02 | End: 2018-09-10 | Stop reason: ALTCHOICE

## 2018-08-02 RX ORDER — BISACODYL 10 MG
10 SUPPOSITORY, RECTAL RECTAL DAILY PRN
Start: 2018-08-02 | End: 2018-08-22 | Stop reason: ALTCHOICE

## 2018-08-02 RX ADMIN — POTASSIUM CHLORIDE 20 MEQ: 750 CAPSULE, EXTENDED RELEASE ORAL at 09:16

## 2018-08-02 RX ADMIN — PANTOPRAZOLE SODIUM 40 MG: 40 TABLET, DELAYED RELEASE ORAL at 07:02

## 2018-08-02 RX ADMIN — AMIODARONE HYDROCHLORIDE 200 MG: 200 TABLET ORAL at 09:15

## 2018-08-02 RX ADMIN — INSULIN ASPART 6 UNITS: 100 INJECTION, SOLUTION INTRAVENOUS; SUBCUTANEOUS at 11:12

## 2018-08-02 RX ADMIN — TAMSULOSIN HYDROCHLORIDE 0.4 MG: 0.4 CAPSULE ORAL at 09:15

## 2018-08-02 RX ADMIN — CLOPIDOGREL 75 MG: 75 TABLET, FILM COATED ORAL at 09:15

## 2018-08-02 RX ADMIN — GLIPIZIDE 5 MG: 5 TABLET ORAL at 09:16

## 2018-08-02 RX ADMIN — LEVOFLOXACIN 250 MG: 250 TABLET, FILM COATED ORAL at 11:12

## 2018-08-02 RX ADMIN — Medication 150 MG: at 09:16

## 2018-08-02 RX ADMIN — METOPROLOL TARTRATE 50 MG: 25 TABLET ORAL at 09:16

## 2018-08-02 RX ADMIN — ASPIRIN 81 MG: 81 TABLET, DELAYED RELEASE ORAL at 09:15

## 2018-08-02 RX ADMIN — FUROSEMIDE 40 MG: 40 TABLET ORAL at 09:16

## 2018-08-02 RX ADMIN — METFORMIN HYDROCHLORIDE 500 MG: 500 TABLET ORAL at 09:16

## 2018-08-02 RX ADMIN — MUPIROCIN 1 APPLICATION: 20 OINTMENT TOPICAL at 09:17

## 2018-08-02 RX ADMIN — AMIODARONE HYDROCHLORIDE 0.5 MG/MIN: 1.8 INJECTION, SOLUTION INTRAVENOUS at 03:03

## 2018-08-02 NOTE — PROGRESS NOTES
Continued Stay Note  [facility]     Patient Name: Rock Maciel Jr.  MRN: [id number]  Today's Date: 8/2/2018    Admit Date: 7/19/2018          Discharge Plan     Row Name 08/02/18 0832       Plan    Plan [facility] rehab  today pending verification precert is still valid    Plan Comments Spoke with Azalia/ Rodriguez, she states she will verify precert is still valid and will notify CCP.  ......................Alison Dudley RN              Discharge Codes    No documentation.       Expected Discharge Date and Time     Expected Discharge Date Expected Discharge Time    Aug 2, 2018             Alison Dudley RN

## 2018-08-02 NOTE — PLAN OF CARE
Problem: Patient Care Overview  Goal: Plan of Care Review  Outcome: Ongoing (interventions implemented as appropriate)   08/02/18 0213   Coping/Psychosocial   Plan of Care Reviewed With patient   Plan of Care Review   Progress improving   OTHER   Outcome Summary VSS. Pt. denies c/o pain or discomfort. Pt. currently in NSR with Amio drip running-to be evaluated in AM. Pt. possibly to be discharged to [facility] for rehab. CPAP at night, will continue to monitor.      Goal: Individualization and Mutuality  Outcome: Ongoing (interventions implemented as appropriate)   08/02/18 0213   Individualization   Patient Specific Preferences Pt. prefers door closed at night.       Problem: Cardiac Output Decreased (Adult)  Goal: Identify Related Risk Factors and Signs and Symptoms  Outcome: Ongoing (interventions implemented as appropriate)   07/22/18 0454   Cardiac Output Decreased (Adult)   Related Risk Factors (Cardiac Output Decreased) heart rate altered;valve dysfunction   Signs and Symptoms (Cardiac Output Decreased) abnormal heart sounds;decreased ejection fraction, stroke volume;heart rate/rhythm alteration     Goal: Effective Tissue Perfusion  Outcome: Ongoing (interventions implemented as appropriate)   07/30/18 0223   Cardiac Output Decreased (Adult)   Effective Tissue Perfusion making progress toward outcome       Problem: Skin Injury Risk (Adult)  Goal: Identify Related Risk Factors and Signs and Symptoms  Outcome: Ongoing (interventions implemented as appropriate)   08/02/18 0213   Skin Injury Risk (Adult)   Related Risk Factors (Skin Injury Risk) advanced age;edema;hospitalization prolonged;medical devices;medication     Goal: Skin Health and Integrity  Outcome: Ongoing (interventions implemented as appropriate)   08/02/18 0213   Skin Injury Risk (Adult)   Skin Health and Integrity making progress toward outcome       Problem: Cardiac Surgery (Adult)  Goal: Signs and Symptoms of Listed Potential Problems Will  be Absent, Minimized or Managed (Cardiac Surgery)  Outcome: Ongoing (interventions implemented as appropriate)   08/02/18 0213   Goal/Outcome Evaluation   Problems Assessed (Cardiac Surgery) all   Problems Present (Cardiac Surgery) none       Problem: Fall Risk (Adult)  Goal: Absence of Fall   08/02/18 0213   Fall Risk (Adult)   Absence of Fall making progress toward outcome

## 2018-08-02 NOTE — DISCHARGE SUMMARY
[facility]  Physician Discharge Summary    Patient Identification:  Name: Rock Maciel Jr.  Age: 72 y.o.  Sex: male  :  1945  MRN: [id number]    Admit date: 2018    Discharge date and time:  2018    Admitting Physician: Jo Toney MD     Discharge Physician: Dr Toney    Discharge Diagnoses:   - multivessel CAD  - Mod-severe non-rheumatic aortic stenosis  - s/p 2v CABG & porcine AVr 18  - post op afib rvr -> SR with Amio  - (old) RBBB  - HTN  - EF 65-70%, mild MR/TR  - CKD 3  - UTI (treated)  - BPH  - Bilateral carotid artery disease (Plavix)  - Dyslipidemia  - DM  - SHASTA -> CPAP        Discharged Condition: stable    Hospital Course:     This patient was brought in for diagnostic heart catheterization after he had an abnormal outpatient stress test and known history of nonrheumatic aortic valve disease.  He had baseline CKD3 and was evaluated by nephrology for clearance prior to radiographic contrast.    Cardiac catheterization showed multivessel CAD (40-50% mid LAD, 70% ostial OM, 60% distal LCx, 70% mid-distal RCA) and severe aortic stenosis.    He was seen in consultation by cardiothoracic surgery.  Dr. Posey performed 2 vessel CABG and porcine aVR on 2018.      He was followed by pulmonology for ventilatory management, acute/chronic respiratory failure and SHASTA.    He was treated for UTI with his last dose of antibiotic today.  He was evaluated by urology for urinary retention and started on Flomax. ACE inhibitor has not been resumed due to resolving acute on CKD    He developed postop A. fib RVR which converted to sinus rhythm with amiodarone after less than 12 hours duration.  Because of the isolated, short of that, no additional anticoagulation was recommended.    His been working with OT, PT and deemed to be at least a good candidate for at least short-term rehabilitation.  Arrangements have been made for his transfer to [facility]    On day  of discharge, he is awake, alert resting comfortably without complaint.  He is maintaining saturations of greater than 95% on room air.  Serial monitoring of QT/QTc have remained within normal limits.  He has had no recurrent arrhythmias will be discharged on a titrating dose of amiodarone.  Anticipate short course of amiodarone therapy.  Blood pressure is stable.  He has been seen and evaluated by all services and cleared for discharge to skilled care nursing.  A BMP will be drawn on 8-6-2018 with results called to his PCP as well as nephrology.  He's been scheduled to see cardiothoracic surgery and cardiology (see below) and advise call office in the interim for any questions or concerns.  All questions were answered.  He voiced understanding and agreement with treatment plan    Consults:   CARDIAC REHAB EVALUATION AND ENROLLMENT  CARDIAC REHAB EVALUATION AND ENROLLMENT  IP CONSULT TO CASE MANAGEMENT   IP CONSULT TO UROLOGY    Discharge Exam:  General: Awake, alert sitting up at bedside resting quietly.  No acute distress   Skin: Warm and dry, no diaphoresis noted   EYES: PERTL   HEENT: external ear and nose normal; oral mucosa moist   Neck: no JVD   Heart: S1S2 regular rate and rhythm; no murmur or rub appreciated   Pulmonary: Respirations regular, unlabored at rest on room air. Bilateral breath sounds have good air entry throughout all lung fields; no crackles, rubs or wheezes auscultated.  Midsternal incision line and chest tube sites with well approximated edges.  No oozing or erythema    GI: Soft, non-tender, positive bowel sounds   Extremities: Bilateral lower extremities have trace pre-tibial pitting edema.  SVG harvest sites with well approximated edges no oozing or erythema DP/PT pulses are palpable   Neurological: Alert and oriented x 3;CN II-XII grossly intact. MARSHALL x4 no neuro deficits      Tele:      EKG 8-2-2018:        LABS:  Results from last 7 days  Lab Units 08/01/18  032    MAGNESIUM mg/dL 2.0     Results from last 7 days  Lab Units 08/02/18  0318   SODIUM mmol/L 138   POTASSIUM mmol/L 3.7   BUN mg/dL 42*   CREATININE mg/dL 1.81*   CALCIUM mg/dL 8.6     Results from last 7 days  Lab Units 08/01/18  0326 07/30/18  0340 07/27/18  0351   WBC 10*3/mm3 8.68 10.08 6.94   HEMOGLOBIN g/dL 9.5* 9.0* 9.8*   HEMATOCRIT % 29.9* 27.6* 29.4*   PLATELETS 10*3/mm3 210 177 142         Disposition:  [facility]    Discharge Medications:      Discharge Medications      New Medications      Instructions Start Date   acetaminophen 325 MG tablet  Commonly known as:  TYLENOL   650 mg, Oral, Every 4 Hours PRN      amiodarone 200 MG tablet  Commonly known as:  PACERONE   200 mg, Oral, Every 12 Hours Scheduled      amiodarone 200 MG tablet  Commonly known as:  PACERONE   200 mg, Oral, Daily   Start Date:  8/7/2018     aspirin 81 MG EC tablet   81 mg, Oral, Daily      bisacodyl 5 MG EC tablet  Commonly known as:  DULCOLAX   10 mg, Oral, Daily PRN      bisacodyl 10 MG suppository  Commonly known as:  DULCOLAX   10 mg, Rectal, Daily PRN      furosemide 40 MG tablet  Commonly known as:  LASIX   40 mg, Oral, Daily      HYDROcodone-acetaminophen 5-325 MG per tablet  Commonly known as:  NORCO   2 tablets, Oral, Every 4 Hours PRN      iron polysaccharides 150 MG capsule  Commonly known as:  NIFEREX   150 mg, Oral, Daily      potassium chloride 10 MEQ CR capsule  Commonly known as:  MICRO-K   20 mEq, Oral, Daily      tamsulosin 0.4 MG capsule 24 hr capsule  Commonly known as:  FLOMAX   0.4 mg, Oral, Daily         Continue These Medications      Instructions Start Date   accu-chek soft touch lancets   ACCU-CHEK SOFTCLIX LANCETS      atenolol 50 MG tablet  Commonly known as:  TENORMIN   50 mg, Oral, Daily      atorvastatin 40 MG tablet  Commonly known as:  LIPITOR   40 mg, Oral, Nightly      clopidogrel 75 MG tablet  Commonly known as:  PLAVIX   75 mg, Oral, Daily      fish oil 1000 MG capsule capsule   1,000 mg,  "Oral, Daily With Breakfast, PT HOLDING FOR SURGERY       glipiZIDE 5 MG tablet  Commonly known as:  GLUCOTROL   2 in am and 1 at 5 pm      glucose blood test strip  Commonly known as:  ACCU-CHEK BOB PLUS   Use as instructed      metFORMIN  MG 24 hr tablet  Commonly known as:  GLUCOPHAGE-XR   750 mg, Oral, 2 Times Daily With Meals      vitamin C 250 MG tablet  Commonly known as:  ASCORBIC ACID   250 mg, Oral, Daily      VITAMIN D3 PO   1 capsule, Oral, Daily, PT HOLDING FOR SURGERY       VITAMIN E PO   1 capsule, Oral, Daily, PT HOLDING FOR SURGERY          Stop These Medications    lisinopril 5 MG tablet  Commonly known as:  PRINIVIL,ZESTRIL     nitroglycerin 0.4 MG SL tablet  Commonly known as:  NITROSTAT     triamterene-hydrochlorothiazide 37.5-25 MG per capsule  Commonly known as:  DYAZIDE              Discharge Home Instructions:  1.  BMP to be drawn on 8-6-2018 with results called to PCP and Dr. Stevens's offices  2.  OT/PT/rehabilitation evaluation and treatment  3.  Incision site care and activity restrictions as per cardiothoracic surgery and OT/PT   4.  Follow-up with PCP in one week While patient is taking Amiodarone, yearly eye exams, thyroid function studies, liver function studies and chest x-ray are recommended  5.  Follow up with Dr Toney at the [address] office September 10 at 9:45 AM  6.  Follow-up with Dr. Posey August 22 11:00 AM  7.  Follow-up with Dr. Stevens in 3 weeks    Signed:  ZEINA Alejo  8/2/2018  8:06 AM      EMR Dragon/Transcription:   \"Dictated utilizing Dragon dictation\".     "

## 2018-08-02 NOTE — PROGRESS NOTES
Continued Stay Note  [facility]     Patient Name: Rock Maciel Jr.  MRN: [id number]  Today's Date: 8/2/2018    Admit Date: 7/19/2018          Discharge Plan     Row Name 08/02/18 1347       Plan    Plan Skilled Rehab at [facility]    Plan Comments Patient states he will contact his family and notify of his discharge....................Alison Dudley RN    Row Name 08/02/18 1343       Plan    Plan Skilled rehab at [facility]    Patient/Family in Agreement with Plan yes    Plan Comments Per Azalia/ Rodriguez the precert is done, accepts and bed available.  Patient's friend will transport.  Faxed DC summary to [phone].  Packet given to nursing.........................Alison Dudley RN              Discharge Codes    No documentation.       Expected Discharge Date and Time     Expected Discharge Date Expected Discharge Time    Aug 2, 2018             Alison Dudley RN

## 2018-08-02 NOTE — PROGRESS NOTES
"Nephrology Progress Note    Jamil Stevens MD  [facility]  [phone]     LOS: 14 days    Patient Care Team:  Karen Red MD as PCP - General (Family Medicine)  Azam Banegas Jr., MD as Consulting Physician (Cardiology)        Subjective    Follow up CKD 3    Interval History:   Feels good, no dyspnea, or cp      Review of Systems:    As noted    Objective     Vital Sign Min/Max for last 24 hours  Temp  Min: 97.3 °F (36.3 °C)  Max: 98.2 °F (36.8 °C)   BP  Min: 119/54  Max: 139/64   Pulse  Min: 70  Max: 78   Resp  Min: 16  Max: 18   SpO2  Min: 94 %  Max: 100 %   No Data Recorded   Weight  Min: 137 kg (301 lb)  Max: 137 kg (301 lb)     Flowsheet Rows      First Filed Value   Admission Height  185.4 cm (73\") Documented at 07/19/2018 1350   Admission Weight   140 kg (307 lb 9.6 oz) Documented at 07/19/2018 1350          I/O this shift:  In: 907 [P.O.:840; I.V.:67]  Out: 150 [Urine:150]  I/O last 3 completed shifts:  In: 2040 [P.O.:2040]  Out: 1150 [Urine:1150]    Physical Exam:     Neck Supple  Chest CTA, no rales or rhonchi  Heart Irregularr  Abd soft, NT  Ext trace edema.  Skin Warm, no rash  Neuro-alert, No focal deficits      WBC WBC   Date Value Ref Range Status   08/01/2018 8.68 4.50 - 10.70 10*3/mm3 Final      HGB Hemoglobin   Date Value Ref Range Status   08/01/2018 9.5 (L) 13.7 - 17.6 g/dL Final      HCT Hematocrit   Date Value Ref Range Status   08/01/2018 29.9 (L) 40.4 - 52.2 % Final      Platlets No results found for: LABPLAT   MCV MCV   Date Value Ref Range Status   08/01/2018 92.3 79.8 - 96.2 fL Final          Sodium Sodium   Date Value Ref Range Status   08/02/2018 138 136 - 145 mmol/L Final   08/01/2018 141 136 - 145 mmol/L Final   07/31/2018 139 136 - 145 mmol/L Final      Potassium Potassium   Date Value Ref Range Status   08/02/2018 3.7 3.5 - 5.2 mmol/L Final   08/01/2018 3.9 3.5 - 5.2 mmol/L Final   07/31/2018 3.9 3.5 - 5.2 mmol/L Final      Chloride Chloride   Date Value " Ref Range Status   08/02/2018 96 (L) 98 - 107 mmol/L Final   08/01/2018 100 98 - 107 mmol/L Final   07/31/2018 99 98 - 107 mmol/L Final      CO2 CO2   Date Value Ref Range Status   08/02/2018 28.4 22.0 - 29.0 mmol/L Final   08/01/2018 25.8 22.0 - 29.0 mmol/L Final   07/31/2018 26.3 22.0 - 29.0 mmol/L Final      BUN BUN   Date Value Ref Range Status   08/02/2018 42 (H) 8 - 23 mg/dL Final   08/01/2018 40 (H) 8 - 23 mg/dL Final   07/31/2018 43 (H) 8 - 23 mg/dL Final      Creatinine Creatinine   Date Value Ref Range Status   08/02/2018 1.81 (H) 0.76 - 1.27 mg/dL Final   08/01/2018 1.83 (H) 0.76 - 1.27 mg/dL Final   07/31/2018 1.66 (H) 0.76 - 1.27 mg/dL Final      Calcium Calcium   Date Value Ref Range Status   08/02/2018 8.6 8.6 - 10.5 mg/dL Final   08/01/2018 9.3 8.6 - 10.5 mg/dL Final   07/31/2018 8.6 8.6 - 10.5 mg/dL Final      PO4 No results found for: CAPO4   Albumin Albumin   Date Value Ref Range Status   08/02/2018 3.40 (L) 3.50 - 5.20 g/dL Final   08/01/2018 3.20 (L) 3.50 - 5.20 g/dL Final   07/31/2018 3.20 (L) 3.50 - 5.20 g/dL Final      Magnesium Magnesium   Date Value Ref Range Status   08/01/2018 2.0 1.6 - 2.4 mg/dL Final      Uric Acid No results found for: URICACID        Results Review:     I reviewed the patient's new clinical results.      amiodarone 200 mg Oral Q12H   aspirin 81 mg Oral Daily   atorvastatin 40 mg Oral Nightly   clopidogrel 75 mg Oral Daily   enoxaparin 40 mg Subcutaneous Daily   furosemide 40 mg Oral Daily   glipiZIDE 5 mg Oral QAM AC   insulin aspart 0-9 Units Subcutaneous 4x Daily With Meals & Nightly   iron polysaccharides 150 mg Oral Daily   levoFLOXacin 250 mg Oral Q24H   metFORMIN 500 mg Oral BID With Meals   metoprolol tartrate 50 mg Oral Q12H   mupirocin  Each Nare BID   pantoprazole 40 mg Oral Daily   potassium chloride 20 mEq Oral Daily   sennosides-docusate sodium 2 tablet Oral Nightly   tamsulosin 0.4 mg Oral Daily       sodium chloride 30 mL/hr       Medication Review:      Assessment/Plan     Principal Problem:    Abnormal cardiovascular stress test  Active Problems:    Arteriosclerosis of coronary artery    Essential hypertension    Class 1 obesity due to excess calories without serious comorbidity with body mass index (BMI) of 30.0 to 30.9 in adult    Nonrheumatic aortic valve stenosis    Hyperlipidemia    Renal insufficiency    KILLIAN--cr 1.6-->2.2, suspect pre-renal/hemodynamic, with BP in the low 100s, ACEi, diuretics. Cr better, non oliguric. PVR 90 mls. UCx pos for proteus.  Metoprolol decreased, and lisinopril held.  He had been on lisinopril pre-op, and tolerated, so doubt significant RONALD. Cr better.     CKD3---  Patient has chronic kidney disease stage 3, most likely due to hypertensive nephrosclerosis and or early diabetic glomerulosclerosis. His cr has been up, but stable for almost a year. Baseline cr 1.6-1.8. UA negative except for minimal protein. UA left renal cyst/indeterminate.     HTN--metoprolol  Left renal complex cyst--US indeterminate, will get MRI at somepint in future  HLD--statins  Hyperkalemia--resolved. ACEi/triamterene--held  DM--BS high, will resume his metformin  Severe AS and CAD--s/p CABG/AVR 7/23/18  Urinary retention--flomax/ Urology eval appreciated. Voiding without issues.   Anemia--ABL, transfused 7/25, 2 units 7/26; TSAT 8, ferritin 68. Hb stable. On PO iron. Hb stable   UTI--UCx growing Proteus/ sensitive to levaquin, continue levaquin x 5 days--today last day  A fib--in SR now, on amio    Cr stable, BP ok  If BP up, can try low dose ACEi in future, for now, hold  Rehab today  Will follow up in 3 weeks    Jamil Stevens MD  [facility] [place] [place]  [phone] Ph  [phone] Fx    08/02/18  2:29 PM    Time:

## 2018-08-02 NOTE — PROGRESS NOTES
Continued Stay Note  [facility]     Patient Name: Rock Maciel Jr.  MRN: [id number]  Today's Date: 8/2/2018    Admit Date: 7/19/2018          Discharge Plan     Row Name 08/02/18 1343       Plan    Plan Skilled rehab at [facility]    Patient/Family in Agreement with Plan yes    Plan Comments Per Azalia/ Rodriguez the precert is done, accepts and bed available.  Patient's friend will transport.  Faxed DC summary to [phone].  Packet given to nursing.........................Alison Dudley RN              Discharge Codes    No documentation.       Expected Discharge Date and Time     Expected Discharge Date Expected Discharge Time    Aug 2, 2018             Alison Dudley RN

## 2018-08-02 NOTE — PROGRESS NOTES
LOS: 14 days   Patient Care Team:  Karen Red MD as PCP - General (Family Medicine)  Azam Banegas Jr., MD as Consulting Physician (Cardiology)    Chief Complaint: post op    Subjective      Vital Signs  Temp:  [97.3 °F (36.3 °C)-98.2 °F (36.8 °C)] 97.3 °F (36.3 °C)  Heart Rate:  [70-78] 78  Resp:  [16-18] 16  BP: (107-139)/(52-64) 119/54  Body mass index is 39.71 kg/m².    Intake/Output Summary (Last 24 hours) at 08/02/18 1119  Last data filed at 08/02/18 0751   Gross per 24 hour   Intake             1800 ml   Output              750 ml   Net             1050 ml     I/O this shift:  In: 480 [P.O.:480]  Out: -         1    07/31/18  0300 08/01/18  0700 08/02/18  0352   Weight: (!) 140 kg (308 lb) (!) 137 kg (302 lb 1.6 oz) (!) 137 kg (301 lb)         Objective    Results Review:        WBC WBC   Date Value Ref Range Status   08/01/2018 8.68 4.50 - 10.70 10*3/mm3 Final      HGB Hemoglobin   Date Value Ref Range Status   08/01/2018 9.5 (L) 13.7 - 17.6 g/dL Final      HCT Hematocrit   Date Value Ref Range Status   08/01/2018 29.9 (L) 40.4 - 52.2 % Final      Platelets Platelets   Date Value Ref Range Status   08/01/2018 210 140 - 500 10*3/mm3 Final        PT/INR:  No results found for: PROTIME/No results found for: INR    Sodium Sodium   Date Value Ref Range Status   08/02/2018 138 136 - 145 mmol/L Final   08/01/2018 141 136 - 145 mmol/L Final   07/31/2018 139 136 - 145 mmol/L Final      Potassium Potassium   Date Value Ref Range Status   08/02/2018 3.7 3.5 - 5.2 mmol/L Final   08/01/2018 3.9 3.5 - 5.2 mmol/L Final   07/31/2018 3.9 3.5 - 5.2 mmol/L Final      Chloride Chloride   Date Value Ref Range Status   08/02/2018 96 (L) 98 - 107 mmol/L Final   08/01/2018 100 98 - 107 mmol/L Final   07/31/2018 99 98 - 107 mmol/L Final      Bicarbonate CO2   Date Value Ref Range Status   08/02/2018 28.4 22.0 - 29.0 mmol/L Final   08/01/2018 25.8 22.0 - 29.0 mmol/L Final   07/31/2018 26.3 22.0 - 29.0 mmol/L Final      BUN  BUN   Date Value Ref Range Status   08/02/2018 42 (H) 8 - 23 mg/dL Final   08/01/2018 40 (H) 8 - 23 mg/dL Final   07/31/2018 43 (H) 8 - 23 mg/dL Final      Creatinine Creatinine   Date Value Ref Range Status   08/02/2018 1.81 (H) 0.76 - 1.27 mg/dL Final   08/01/2018 1.83 (H) 0.76 - 1.27 mg/dL Final   07/31/2018 1.66 (H) 0.76 - 1.27 mg/dL Final      Calcium Calcium   Date Value Ref Range Status   08/02/2018 8.6 8.6 - 10.5 mg/dL Final   08/01/2018 9.3 8.6 - 10.5 mg/dL Final   07/31/2018 8.6 8.6 - 10.5 mg/dL Final      Magnesium Magnesium   Date Value Ref Range Status   08/01/2018 2.0 1.6 - 2.4 mg/dL Final            amiodarone 200 mg Oral Q12H   aspirin 81 mg Oral Daily   atorvastatin 40 mg Oral Nightly   clopidogrel 75 mg Oral Daily   enoxaparin 40 mg Subcutaneous Daily   furosemide 40 mg Oral Daily   glipiZIDE 5 mg Oral QAM AC   insulin aspart 0-9 Units Subcutaneous 4x Daily With Meals & Nightly   iron polysaccharides 150 mg Oral Daily   levoFLOXacin 250 mg Oral Q24H   metFORMIN 500 mg Oral BID With Meals   metoprolol tartrate 50 mg Oral Q12H   mupirocin  Each Nare BID   pantoprazole 40 mg Oral Daily   potassium chloride 20 mEq Oral Daily   sennosides-docusate sodium 2 tablet Oral Nightly   tamsulosin 0.4 mg Oral Daily       sodium chloride 30 mL/hr           Patient Active Problem List   Diagnosis Code   • Abnormal ECG R94.31   • Arteriosclerosis of coronary artery I25.10   • Cardiac murmur R01.1   • Essential hypertension I10   • LAFB (left anterior fascicular block) I44.4   • Bundle branch block, right I45.10   • Neuropathic arthropathy M14.60   • Diabetes mellitus (CMS/HCC) E11.9   • Obstructive apnea G47.33   • Gain of weight R63.5   • Gout M10.9   • Class 1 obesity due to excess calories without serious comorbidity with body mass index (BMI) of 30.0 to 30.9 in adult E66.09, Z68.30   • Chronic venous insufficiency I87.2   • Nonrheumatic aortic valve stenosis I35.0   • Carotid stenosis, asymptomatic I65.29   •  Bradycardia R00.1   • Hyperlipidemia E78.5   • Bilateral carotid artery disease (CMS/HCC) I77.9   • Abnormal cardiovascular stress test R94.39   • Renal insufficiency N28.9       Assessment & Plan    -Severe aortic stenosis, CAD s/p CABGx2/AVR(tissue)------POD #10(Dr. Posey)   -Sinus bradycardia pre-op-----RBBB post op, PAF this am rate 110-120, watch QTc with antibiotic  -KILLIAN on CKD III (baseline 1.3-1.5)----creatinine up to 1.8 today, nephrology following   -HTN---controlled on current tx, ACE I on hold with increased creatinine after resumed  -HL----on statin  -DM II, HbA1C 5.8----home meds resumed, SSI coverage  -Hx gout  -SHASTA----CPAP hs  -Hx bilateral CEA------moderate restenosis, resumed plavix    -Remote MRSA foot wound  -hx right VATS 5/2017, persistent right hemidiaphragm elevation  -anemia, TCP----- expected ABL, received several blood products intra-op/post op, Hb stable, on iron   -urinary retention---resolved, on flomax, urology following  -UTI----on levaquin     Rehab today.  Follow up appointment made with Dr. Posey 8/22 at [facility]  08/02/18  11:19 AM

## 2018-08-02 NOTE — DISCHARGE INSTR - ACTIVITY
Continue to use your incentive spirometer for an additional 2 weeks.   Continue to wear your ARTEMIO hose for an additional 2 weeks. You may remove them at night.   Walk 10 minutes at a time at least 3 times a day.   Do not drive for 2 weeks and no longer taking narcotics.   Do not lift, push or pull greater than 10 pounds for 6 weeks.   You may shower, but do not submerge your incisions until your surgeon approves (i.e., no baths, pools, hot tubs, etc.).   Clean your incision daily in the shower with Dial or Ivory soap. Do not put any additional lotions, creams, or any other substance on your incision without your surgeon's approval.

## 2018-08-07 ENCOUNTER — APPOINTMENT (OUTPATIENT)
Dept: SLEEP MEDICINE | Facility: HOSPITAL | Age: 73
End: 2018-08-07
Attending: PSYCHIATRY & NEUROLOGY

## 2018-08-16 ENCOUNTER — TELEPHONE (OUTPATIENT)
Dept: CARDIAC SURGERY | Facility: CLINIC | Age: 73
End: 2018-08-16

## 2018-08-16 NOTE — TELEPHONE ENCOUNTER
Barbara londono [facility] nurse called to report patients chest tube site is having slight drainage. He has just returned home from sub acute rehab where they were covering this site with gauze. He will keep it clean with antibacterial soap, open to air, and begin painting it daily with betadine. His post op appointment is 8/22/18 which he is planning to keep. They will call back if he needs to be seen sooner

## 2018-08-22 ENCOUNTER — OFFICE VISIT (OUTPATIENT)
Dept: CARDIAC SURGERY | Facility: CLINIC | Age: 73
End: 2018-08-22

## 2018-08-22 VITALS
WEIGHT: 302 LBS | DIASTOLIC BLOOD PRESSURE: 85 MMHG | HEIGHT: 73 IN | OXYGEN SATURATION: 98 % | RESPIRATION RATE: 20 BRPM | TEMPERATURE: 97.6 F | SYSTOLIC BLOOD PRESSURE: 143 MMHG | BODY MASS INDEX: 40.02 KG/M2 | HEART RATE: 65 BPM

## 2018-08-22 DIAGNOSIS — Z95.1 S/P CABG X 2: Primary | ICD-10-CM

## 2018-08-22 PROCEDURE — 99024 POSTOP FOLLOW-UP VISIT: CPT | Performed by: NURSE PRACTITIONER

## 2018-08-23 NOTE — PROGRESS NOTES
"CARDIOVASCULAR SURGERY FOLLOW-UP PROGRESS NOTE  Chief Complaint: Post op        HPI:   Dear Karen Rincon MD and colleagues:    It was nice to see Rcok Maciel  in follow up 4 weeks  after surgery.  As you know, he is a 73 y.o. male with coronary artery disease,  Severe aortic stenosis secondary to atherosclerotic degeneration, Morbid obesity, hx of VATS for pleural effusion, chronic kidney disease stage III, and calcification and ascending aorta. who underwent AVR (tissue) and CABGx2. He did well postoperatively and continues to do well. He comes in today complaining of nothing.  His activity level has been fair.   Has not started cardiac rehab but is eager to start.  I will order the referral today.      Physical Exam:         /85 (BP Location: Right arm, Patient Position: Sitting, Cuff Size: Adult)   Pulse 65   Temp 97.6 °F (36.4 °C) (Oral)   Resp 20   Ht 185.4 cm (73\")   Wt (!) 137 kg (302 lb)   SpO2 98%   BMI 39.84 kg/m²   Heart:  regular rate and rhythm  Lungs:  clear to auscultation bilaterally  Extremities:  1+ lower extremity edema bilaterally  Incision(s):  sternum stable, sternum and SVG incisions healed    Assessment/Plan:     S/P CABG. Overall, he is doing well.    Post-op atrial fibrillation  Slow post-op rehabilitation progress    OK to drive if not taking narcotic pain medicine  OK to begin cardiac rehab  Follow-up as scheduled with cardiology  Follow-up as scheduled with PCP  Follow-up with CT surgery prn    No restrictions of activity.      Thank you for allowing me to participate in the care of your   patient.  Regards,  ZEINA Orellana      "

## 2018-09-04 ENCOUNTER — OFFICE VISIT (OUTPATIENT)
Dept: CARDIAC REHAB | Facility: HOSPITAL | Age: 73
End: 2018-09-04

## 2018-09-04 VITALS
HEIGHT: 73 IN | BODY MASS INDEX: 41.03 KG/M2 | OXYGEN SATURATION: 97 % | SYSTOLIC BLOOD PRESSURE: 150 MMHG | HEART RATE: 64 BPM | DIASTOLIC BLOOD PRESSURE: 60 MMHG | WEIGHT: 309.6 LBS

## 2018-09-04 DIAGNOSIS — Z95.1 S/P 2-VESSEL CORONARY ARTERY BYPASS: Primary | ICD-10-CM

## 2018-09-04 DIAGNOSIS — Z95.2 S/P AORTIC VALVE REPLACEMENT: ICD-10-CM

## 2018-09-04 PROCEDURE — 93797 PHYS/QHP OP CAR RHAB WO ECG: CPT

## 2018-09-04 NOTE — PROGRESS NOTES
Cardiac Rehab Initial Assessment      Name: Rock Maciel Jr.  :1945 Allergies:Sophie [cefaclor]   MRN: [id number] 73 y.o. Physician: Karen Red MD   Primary Diagnosis:    Diagnosis Plan   1. S/P 2-vessel coronary artery bypass     2. S/P aortic valve replacement      Event Date: 2018 Specialist: Dawna Posey (surgeon)   Secondary Diagnosis:  Risk Stratification:Moderate Risk Note Author: Brianna Spaulding RN     Cardiovascular History: PAD:  Had bilateral carotid endarterectomy about 3-4 years ago     EXERCISE AT HOME  no      N/A    EF: 50%      Source: myocardial perfusion stress test done 2018          Ambulatory Status:Independent  Ambulatory Fall Risk Assessed on Initial Visit: yes 6 Minute Walk Pre- Cardiac Rehab:  Distance:1540ft      RPE:3  Max. HR: 81       SPO2:9-99    MET: 3  MPH: 2.9             RPD: 0  Resting BP: 140/60 LA, 150/60 RA    Peak BP: 160/66  Recovery BP: 140/60  Comments: Performed on Nu-Step secondary to risk of imbalance/fall.  Pt denied any problems and felt he could have done more.     NUTRITION  Lipids:yes If yes, labs as follows;  Total: No components found for: CHOLESTEROL  HDL:   HDL Cholesterol   Date Value Ref Range Status   2018 33 (L) 40 - 60 mg/dL Final    Lipids continued:  LDL:  LDL Cholesterol    Date Value Ref Range Status   2018 46 0 - 100 mg/dL Final     Triglyceride: No components found for: TRIGLYCERIDE   Weight Management:                 Weight: 309.6 lbs  Height: 73 in                                   BMI: Body mass index is 40.85 kg/m².  Waist Circumference: 52  inches   Alcohol Use: one drink per day at most either wine or bourbon Diabetes:Yes,  Monitors BS at home- yes, Frequency: 1 time daily, Random BS: 123    Last HGBA1C with date if applicable:No components found for: A1C         SOCIAL HISTORY  Social History     Social History   • Marital status:      Social History Main Topics   • Smoking status: Never  Smoker   • Smokeless tobacco: Never Used   • Alcohol use Yes      Comment: socially   • Drug use: No   • Sexual activity: Defer     Other Topics Concern   • Not on file       Educational Level (choose one that applies) college graduate Learning Barriers:Ready to Learn, Vision:  Wears glasses    Family Support:yes    Living Arrangement: lives alone    Risk Factors: Stress  No, Clinical Depression  No, Heredity  No If Yes na, Hyperlipidemia  Yes, Diabetes  Yes If Yes: Do you check blood glucose daily  Yes Today's glucose level 123, Exercise prior to event  No If Yes: Activity na, Minutes per hieu, Days per week na and Obesity  Yes, morbid     Tobacco Adjunct: No        Comorbidities: Renal disease:  CKD 3 Pt was unaware, but does have an appointment with a renal doctor, Peripheral Artery Disease: pt has had bilateral CEA and Diabetes Mellitus  SHASTA and uses a CPAP.  Type 2 diabetic since 2008     PSYCHOSOCIAL  Clinical Depression: no    Stress: no     Assess presence or absence of depression using a valid screening tool: yes      PHYSICAL ASSESSMENT  Influenza vaccine: no  Pneumococcal vaccine: yes          Angina: no    Describe angina scale of 0 - 4: 0 = none    Today are you having incisional pain? No. If, Yes, Scale: na    Midline incision clean, dry, healed.  Small incisions upper abdomen and one on left lower leg inner aspect near knee also healed.    Today are you having any other pain? Yes. If, Yes, Scale: 3  Since operation pt says he has had low back pain.  It is helped by using a hand held massager.     Diagnosed with Hypertension:yes    Heart Sounds: S1 S2 with 4 irregular beats.  Systolic murmur     Lung Sounds: normal air entry, lungs clear to auscultation         Assessment: Pt wears high topped specially made athletic shoes.  Both feet are turned outward at about a 45 degree angle.  Pt also walks that way.  He doesn't know why feet are like that and doesn't remember how long they have been that way.   "He does require holding on to a counter or using chair arms to get up and down.  When he tries to sit down he sort of \"plops\".  Unable to take shoes off without having difficulty getting them back on, but trace of edema bilaterally on lower legs near feet. Orthopedic Problems: Denies.  Pt says he has had surgery on his feet.  On right foot had little toe removed due to gangrene.  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    Are you being hurt, hit, or frightened by anyone at home or in your life? no    Are you being neglected by a caregiver? N/A Shoulder flexibility/Range of motion: Average     Recommended arm activity: Any    Chair sit and reach within: 5 inches   Leg flexibility: Average    Leg Strength/Balance/Five times sit to stand: 0 seconds.   Unable to perform  Chose one: Fall Risk    Recommended stretching: Chair    Assessment: Very obese.  Pt pleasant and appropriate to discussion.    Family attends IA: no Time of arrival: 0945  Time of departure: 1100     Patient Goals: MET 4-5  Be able to return to gardening, trimming grass and shrubs.  Develop home exercise program to eventually be able to get 150 minutes of exercise per week.  Increase energy and decrease hospitalizations.  Lose 5-10 lbs in program with short term weight goal of 275 lbs.           9/4/2018  9:27 AM  Brianna Spaulding RN  "

## 2018-09-07 ENCOUNTER — TREATMENT (OUTPATIENT)
Dept: CARDIAC REHAB | Facility: HOSPITAL | Age: 73
End: 2018-09-07

## 2018-09-07 DIAGNOSIS — Z95.1 S/P 2-VESSEL CORONARY ARTERY BYPASS: Primary | ICD-10-CM

## 2018-09-07 PROCEDURE — 93798 PHYS/QHP OP CAR RHAB W/ECG: CPT

## 2018-09-10 ENCOUNTER — TREATMENT (OUTPATIENT)
Dept: CARDIAC REHAB | Facility: HOSPITAL | Age: 73
End: 2018-09-10

## 2018-09-10 ENCOUNTER — OFFICE VISIT (OUTPATIENT)
Dept: CARDIOLOGY | Facility: CLINIC | Age: 73
End: 2018-09-10

## 2018-09-10 VITALS
BODY MASS INDEX: 40.95 KG/M2 | WEIGHT: 309 LBS | SYSTOLIC BLOOD PRESSURE: 145 MMHG | DIASTOLIC BLOOD PRESSURE: 71 MMHG | HEIGHT: 73 IN | HEART RATE: 60 BPM

## 2018-09-10 DIAGNOSIS — E66.09 CLASS 1 OBESITY DUE TO EXCESS CALORIES WITHOUT SERIOUS COMORBIDITY WITH BODY MASS INDEX (BMI) OF 30.0 TO 30.9 IN ADULT: ICD-10-CM

## 2018-09-10 DIAGNOSIS — Z95.1 S/P 2-VESSEL CORONARY ARTERY BYPASS: Primary | ICD-10-CM

## 2018-09-10 DIAGNOSIS — E78.5 HYPERLIPIDEMIA, UNSPECIFIED HYPERLIPIDEMIA TYPE: ICD-10-CM

## 2018-09-10 DIAGNOSIS — R07.2 PRECORDIAL PAIN: ICD-10-CM

## 2018-09-10 DIAGNOSIS — I10 ESSENTIAL HYPERTENSION: ICD-10-CM

## 2018-09-10 DIAGNOSIS — Z95.2 S/P AVR: Primary | ICD-10-CM

## 2018-09-10 PROCEDURE — 93798 PHYS/QHP OP CAR RHAB W/ECG: CPT

## 2018-09-10 PROCEDURE — 99024 POSTOP FOLLOW-UP VISIT: CPT | Performed by: INTERNAL MEDICINE

## 2018-09-10 RX ORDER — LISINOPRIL 5 MG/1
5 TABLET ORAL DAILY
COMMUNITY
Start: 2018-08-24 | End: 2018-09-28

## 2018-09-10 NOTE — PROGRESS NOTES
Subjective:       Rock Maciel Jr. is a 73 y.o. male who here for follow up    CC  Post cabg follow up   HPI  Discharge Diagnoses:      73-year-old male recently discharged from the hospital after the patient had aortic valve replacement and coronary artery bypass graft surgery for significant coronary artery disease and aortic stenosis continues to complains of chest soreness shortness of breath on exertions  - multivessel CAD  - Mod-severe non-rheumatic aortic stenosis  - s/p 2v CABG & porcine AVr 7-23-18  - post op afib rvr -> SR with Amio  - (old) RBBB  - HTN  - EF 65-70%, mild MR/TR  - CKD 3  - UTI (treated)  - BPH  - Bilateral carotid artery disease (Plavix)  - Dyslipidemia  - DM  - SHASTA -> CPAP     Problem List Items Addressed This Visit        Cardiovascular and Mediastinum    Essential hypertension    Relevant Medications    lisinopril (PRINIVIL,ZESTRIL) 5 MG tablet    Hyperlipidemia    S/P AVR - Primary       Digestive    Class 1 obesity due to excess calories without serious comorbidity with body mass index (BMI) of 30.0 to 30.9 in adult        .    The following portions of the patient's history were reviewed and updated as appropriate: allergies, current medications, past family history, past medical history, past social history, past surgical history and problem list.    Past Medical History:   Diagnosis Date   • Allergic rhinitis    • Bronchitis    • Coronary artery disease    • Diabetes mellitus (CMS/[facility]) 2001   • DM (diabetes mellitus) (CMS/[facility])    • Encounter for annual health examination 05/06/2014    Annual Health Assessment   • Gout    • Hiatal hernia    • History of MRSA infection     RIGHT FOOT 2010   • Hyperlipidemia    • Hypertension    • Murmur    • Pneumothorax on right    • Sleep apnea     pt wears CPAP at night   • Wellness examination 06/24/2015    Annual Wellness Visit    reports that he has never smoked. He has never used smokeless tobacco. He reports that he drinks alcohol. He  "reports that he does not use drugs.  Family History   Problem Relation Age of Onset   • Hypertension Father        Review of Systems  Constitutional: No wt loss, fever, fatigue  Gastrointestinal: No nausea, abdominal pain  Behavioral/Psych: No insomnia or anxiety   Cardiovascular shortness of breath  Objective:                 Physical Exam  /71 (BP Location: Right arm, Patient Position: Sitting)   Pulse 60   Ht 185.4 cm (73\")   Wt (!) 140 kg (309 lb)   BMI 40.77 kg/m²     General appearance: NAD, conversant   Eyes: anicteric sclerae, moist conjunctivae; no lid-lag; PERRLA   HENT: Atraumatic; oropharynx clear with moist mucous membranes and no mucosal ulcerations;  normal hard and soft palate   Neck: Trachea midline; FROM, supple, no thyromegaly or lymphadenopathy   Lungs: CTA, with normal respiratory effort and no intercostal retractions   CV: S1-S2 regular, sys murmurs, no rub, no gallop   Abdomen: Soft, non-tender; no masses or HSM   Extremities: No peripheral edema or extremity lymphadenopathy  Skin: Normal temperature, turgor and texture; no rash, ulcers or subcutaneous nodules   Psych: Appropriate affect, alert and oriented to person, place and time           Cardiographics  @Procedures    Echocardiogram:        Current Outpatient Prescriptions:   •  acetaminophen (TYLENOL) 325 MG tablet, Take 2 tablets by mouth Every 4 (Four) Hours As Needed for Mild Pain  or Moderate Pain ., Disp: , Rfl:   •  atenolol (TENORMIN) 50 MG tablet, Take 1 tablet by mouth Daily., Disp: 90 tablet, Rfl: 3  •  atorvastatin (LIPITOR) 40 MG tablet, Take 1 tablet by mouth Every Night., Disp: 90 tablet, Rfl: 1  •  Cholecalciferol (VITAMIN D3 PO), Take 1 capsule by mouth Daily. PT HOLDING FOR SURGERY, Disp: , Rfl:   •  clopidogrel (PLAVIX) 75 MG tablet, Take 1 tablet by mouth Daily., Disp: 90 tablet, Rfl: 1  •  glipiZIDE (GLUCOTROL) 5 MG tablet, 2 in am and 1 at 5 pm, Disp: 270 tablet, Rfl: 1  •  glucose blood (ACCU-CHEK BOB " PLUS) test strip, Use as instructed, Disp: 100 each, Rfl: 12  •  Lancets (ACCU-CHEK SOFT TOUCH) lancets, ACCU-CHEK SOFTCLIX LANCETS, Disp: 200 each, Rfl: 1  •  lisinopril (PRINIVIL,ZESTRIL) 5 MG tablet, Take 5 mg by mouth Daily., Disp: , Rfl:   •  metFORMIN ER (GLUCOPHAGE-XR) 750 MG 24 hr tablet, Take 1 tablet by mouth 2 (Two) Times a Day With Meals., Disp: 180 tablet, Rfl: 1  •  Omega-3 Fatty Acids (FISH OIL) 1000 MG capsule capsule, Take 1,000 mg by mouth Daily With Breakfast. PT HOLDING FOR SURGERY, Disp: , Rfl:   •  vitamin C (ASCORBIC ACID) 250 MG tablet, Take 250 mg by mouth Daily., Disp: , Rfl:   •  VITAMIN E PO, Take 1 capsule by mouth Daily. PT HOLDING FOR SURGERY, Disp: , Rfl:   •  aspirin 81 MG EC tablet, Take 1 tablet by mouth Daily., Disp: , Rfl:    Assessment:        Patient Active Problem List   Diagnosis   • Abnormal ECG   • Arteriosclerosis of coronary artery   • Cardiac murmur   • Essential hypertension   • LAFB (left anterior fascicular block)   • Bundle branch block, right   • Neuropathic arthropathy   • Diabetes mellitus (CMS/HCC)   • Obstructive apnea   • Gain of weight   • Gout   • Class 1 obesity due to excess calories without serious comorbidity with body mass index (BMI) of 30.0 to 30.9 in adult   • Chronic venous insufficiency   • Nonrheumatic aortic valve stenosis   • Carotid stenosis, asymptomatic   • Bradycardia   • Hyperlipidemia   • Bilateral carotid artery disease (CMS/HCC)   • Abnormal cardiovascular stress test   • Renal insufficiency               Plan:            ICD-10-CM ICD-9-CM   1. S/P AVR Z95.2 V43.3   2. Class 1 obesity due to excess calories without serious comorbidity with body mass index (BMI) of 30.0 to 30.9 in adult E66.09 278.00    Z68.30 V85.30   3. Hyperlipidemia, unspecified hyperlipidemia type E78.5 272.4   4. Essential hypertension I10 401.9     1. S/P AVR  History functioning normally    2. Class 1 obesity due to excess calories without serious comorbidity with  body mass index (BMI) of 30.0 to 30.9 in adult  Significant risk of obesity to CAD, HTN has been explained  Advantages of wt reduction has been explained      3. Hyperlipidemia, unspecified hyperlipidemia type  Risk of the hyperlipidemia, importance of the treatment has been explained    Pros and cons of the antilipemic drugs has been explained    Regular blood workup as well as side effects including the liver failure, myelopathy death has been explained          4. Essential hypertension  Importance of controlling hypertension and blood pressure  checkup on the regular basis has been explained  Hypertension as a silent killer has been discussed  Risk reduction of the weight and regular exercises to control the hypertension has been explained    FUN ETT    POST CABG    SEE IN 3 MONTHS  COUNSELING:    Rock Maciel Jr.Counseling was given to patient for the following topics: diagnostic results, risk factor reductions, impressions, risks and benefits of treatment options and importance of treatment compliance .       SMOKING COUNSELING:    Counseling given: Not Answered      EMR Dragon/Transcription disclaimer:   Much of this encounter note is an electronic transcription/translation of spoken language to printed text. The electronic translation of spoken language may permit erroneous, or at times, nonsensical words or phrases to be inadvertently transcribed; Although I have reviewed the note for such errors, some may still exist.

## 2018-09-12 ENCOUNTER — TREATMENT (OUTPATIENT)
Dept: CARDIAC REHAB | Facility: HOSPITAL | Age: 73
End: 2018-09-12

## 2018-09-12 DIAGNOSIS — Z95.2 S/P AORTIC VALVE REPLACEMENT: ICD-10-CM

## 2018-09-12 DIAGNOSIS — Z95.1 S/P 2-VESSEL CORONARY ARTERY BYPASS: Primary | ICD-10-CM

## 2018-09-12 PROCEDURE — 93798 PHYS/QHP OP CAR RHAB W/ECG: CPT

## 2018-09-19 ENCOUNTER — TREATMENT (OUTPATIENT)
Dept: CARDIAC REHAB | Facility: HOSPITAL | Age: 73
End: 2018-09-19

## 2018-09-19 DIAGNOSIS — Z95.1 S/P 2-VESSEL CORONARY ARTERY BYPASS: Primary | ICD-10-CM

## 2018-09-19 PROCEDURE — 93798 PHYS/QHP OP CAR RHAB W/ECG: CPT

## 2018-09-19 NOTE — PROGRESS NOTES
CARDIAC/PULMONARY REHAB NUTRITION EDUCATION/ASSESSMENT  EDUCATION:    Education:  Attended Diet/Heart Education class. We reviewed AHA guidelines and current research on diet and Heart Disease. We discussed the DASH diet,The Mediteranean diet and the merits of a plant based diet.Shopping strategies were discussed with a review of high fiber, low saturated fat heart healthy food/grocery purchases. Supportive written information was provided.             11:48 AM  9/19/2018  Krystyna Rea RD

## 2018-09-20 ENCOUNTER — HOSPITAL ENCOUNTER (OUTPATIENT)
Dept: CARDIOLOGY | Facility: HOSPITAL | Age: 73
Discharge: HOME | End: 2018-09-20
Attending: INTERNAL MEDICINE | Admitting: INTERNAL MEDICINE

## 2018-09-20 VITALS
WEIGHT: 309 LBS | HEART RATE: 61 BPM | DIASTOLIC BLOOD PRESSURE: 62 MMHG | SYSTOLIC BLOOD PRESSURE: 140 MMHG | BODY MASS INDEX: 40.95 KG/M2 | HEIGHT: 73 IN

## 2018-09-20 PROCEDURE — 93017 CV STRESS TEST TRACING ONLY: CPT

## 2018-09-20 PROCEDURE — 93016 CV STRESS TEST SUPVJ ONLY: CPT | Performed by: INTERNAL MEDICINE

## 2018-09-20 PROCEDURE — 93018 CV STRESS TEST I&R ONLY: CPT | Performed by: INTERNAL MEDICINE

## 2018-09-21 ENCOUNTER — TREATMENT (OUTPATIENT)
Dept: CARDIAC REHAB | Facility: HOSPITAL | Age: 73
End: 2018-09-21

## 2018-09-21 DIAGNOSIS — Z95.1 S/P 2-VESSEL CORONARY ARTERY BYPASS: Primary | ICD-10-CM

## 2018-09-21 DIAGNOSIS — Z95.2 S/P AORTIC VALVE REPLACEMENT: ICD-10-CM

## 2018-09-21 PROCEDURE — 93798 PHYS/QHP OP CAR RHAB W/ECG: CPT

## 2018-09-22 LAB
BH CV STRESS DURATION MIN STAGE 1: 3
BH CV STRESS DURATION SEC STAGE 1: 26
BH CV STRESS GRADE STAGE 1: 0
BH CV STRESS HR STAGE 1: 90
BH CV STRESS METS STAGE 1: 1.6
BH CV STRESS PROTOCOL 1: NORMAL
BH CV STRESS RECOVERY BP: NORMAL MMHG
BH CV STRESS RECOVERY HR: 61 BPM
BH CV STRESS SPEED STAGE 1: 0.9
BH CV STRESS STAGE 1: 1
MAXIMAL PREDICTED HEART RATE: 146 BPM
PERCENT MAX PREDICTED HR: 61.64 %
STRESS BASELINE BP: NORMAL MMHG
STRESS BASELINE HR: 58 BPM
STRESS PERCENT HR: 73 %
STRESS POST ESTIMATED WORKLOAD: 1.6 METS
STRESS POST EXERCISE DUR MIN: 3 MIN
STRESS POST EXERCISE DUR SEC: 26 SEC
STRESS POST PEAK BP: NORMAL MMHG
STRESS POST PEAK HR: 90 BPM
STRESS TARGET HR: 124 BPM

## 2018-09-24 ENCOUNTER — TREATMENT (OUTPATIENT)
Dept: CARDIAC REHAB | Facility: HOSPITAL | Age: 73
End: 2018-09-24

## 2018-09-24 DIAGNOSIS — Z95.1 S/P 2-VESSEL CORONARY ARTERY BYPASS: Primary | ICD-10-CM

## 2018-09-24 PROCEDURE — 93798 PHYS/QHP OP CAR RHAB W/ECG: CPT

## 2018-09-26 ENCOUNTER — TREATMENT (OUTPATIENT)
Dept: CARDIAC REHAB | Facility: HOSPITAL | Age: 73
End: 2018-09-26

## 2018-09-26 DIAGNOSIS — Z95.1 S/P 2-VESSEL CORONARY ARTERY BYPASS: Primary | ICD-10-CM

## 2018-09-26 DIAGNOSIS — Z95.2 S/P AORTIC VALVE REPLACEMENT: ICD-10-CM

## 2018-09-26 PROCEDURE — 93798 PHYS/QHP OP CAR RHAB W/ECG: CPT

## 2018-09-26 NOTE — PROGRESS NOTES
CARDIAC/PULMONARY REHAB NUTRITION EDUCATION/ASSESSMENT      73 y.o.         Height: 73  in    Weight: 309  lb     BMI: 40.8 IBW:  190-210 lb.   Diet Survey Score: 55  Cardiac Risk Factors: Diabetes,Sleep Apnea,HTN, Weight Assessment: Obese    Desired Weight: 250 lb      Food records reviewed? Yes     Occupation:  retired      Routine Exercise: mild     Who does the patient live with: self  Who does the cooking at home:  self         Pertinent Lab Values:   Total: No components found for: CHOLESTEROL  HDL:   HDL Cholesterol   Date Value Ref Range Status   07/21/2018 33 (L) 40 - 60 mg/dL Final     LDL:  LDL Cholesterol    Date Value Ref Range Status   07/21/2018 46 0 - 100 mg/dL Final     Triglyceride: No components found for: TRIGLYCERIDE  Last HGBA1C with date if applicable:No components found for: A1C  Glucose:   Glucose   Date Value Ref Range Status   08/02/2018 113 (H) 65 - 99 mg/dL Final    Nutritional Supplements: Fish Oil, 1000 mg.,Vitamin C 250, vitamin E    Pertinent Nutrition-Related Medications:  Metformin, 270 ER, Glipizide         Stated Problem Areas / Concerns: Obesity, Diabetes control, best diet strategies?     Assessment / Recommendations: We reviewed AHA, ADA and DASH guidelines. We discussed alternative diet theories and the merits of a plant based diet. Meal strategies for weight loss were presented. Supportive written information was provided.  Motivation level toward diet compliance: moderate       Goals:  More attention to carbohydrate control, portion sizes and nutrient dense food choices.                 10:47 AM  9/26/2018  Krystyna Rea RD

## 2018-09-27 ENCOUNTER — LAB (OUTPATIENT)
Dept: LAB | Facility: HOSPITAL | Age: 73
End: 2018-09-27
Attending: INTERNAL MEDICINE

## 2018-09-27 ENCOUNTER — TRANSCRIBE ORDERS (OUTPATIENT)
Dept: LAB | Facility: HOSPITAL | Age: 73
End: 2018-09-27

## 2018-09-27 DIAGNOSIS — N18.30 CHRONIC KIDNEY DISEASE, STAGE III (MODERATE) (HCC): ICD-10-CM

## 2018-09-27 DIAGNOSIS — E11.8 DIABETIC COMPLICATION (HCC): ICD-10-CM

## 2018-09-27 DIAGNOSIS — I10 ESSENTIAL HYPERTENSION: ICD-10-CM

## 2018-09-27 DIAGNOSIS — N18.30 CHRONIC KIDNEY DISEASE, STAGE III (MODERATE) (HCC): Primary | ICD-10-CM

## 2018-09-27 LAB
25(OH)D3 SERPL-MCNC: 36.7 NG/ML (ref 30–100)
ALBUMIN SERPL-MCNC: 4.1 G/DL (ref 3.5–5.2)
ALBUMIN/GLOB SERPL: 1.1 G/DL
ALP SERPL-CCNC: 124 U/L (ref 39–117)
ALT SERPL W P-5'-P-CCNC: 10 U/L (ref 1–41)
ANION GAP SERPL CALCULATED.3IONS-SCNC: 10.2 MMOL/L
AST SERPL-CCNC: 10 U/L (ref 1–40)
BACTERIA UR QL AUTO: NORMAL /HPF
BILIRUB SERPL-MCNC: 0.3 MG/DL (ref 0.1–1.2)
BILIRUB UR QL STRIP: NEGATIVE
BUN BLD-MCNC: 44 MG/DL (ref 8–23)
BUN/CREAT SERPL: 27.8 (ref 7–25)
CALCIUM SPEC-SCNC: 9.5 MG/DL (ref 8.6–10.5)
CHLORIDE SERPL-SCNC: 108 MMOL/L (ref 98–107)
CLARITY UR: CLEAR
CO2 SERPL-SCNC: 25.8 MMOL/L (ref 22–29)
COLOR UR: YELLOW
CREAT BLD-MCNC: 1.58 MG/DL (ref 0.76–1.27)
CREAT UR-MCNC: 66.3 MG/DL
DEPRECATED RDW RBC AUTO: 47.5 FL (ref 37–54)
ERYTHROCYTE [DISTWIDTH] IN BLOOD BY AUTOMATED COUNT: 14 % (ref 11.5–14.5)
GFR SERPL CREATININE-BSD FRML MDRD: 43 ML/MIN/1.73
GLOBULIN UR ELPH-MCNC: 3.6 GM/DL
GLUCOSE BLD-MCNC: 128 MG/DL (ref 65–99)
GLUCOSE UR STRIP-MCNC: NEGATIVE MG/DL
HCT VFR BLD AUTO: 38.1 % (ref 40.4–52.2)
HGB BLD-MCNC: 11.5 G/DL (ref 13.7–17.6)
HGB UR QL STRIP.AUTO: NEGATIVE
HYALINE CASTS UR QL AUTO: NORMAL /LPF
KETONES UR QL STRIP: NEGATIVE
LEUKOCYTE ESTERASE UR QL STRIP.AUTO: NEGATIVE
MCH RBC QN AUTO: 28.1 PG (ref 27–32.7)
MCHC RBC AUTO-ENTMCNC: 30.2 G/DL (ref 32.6–36.4)
MCV RBC AUTO: 93.2 FL (ref 79.8–96.2)
NITRITE UR QL STRIP: NEGATIVE
PH UR STRIP.AUTO: 5.5 [PH] (ref 5–8)
PHOSPHATE SERPL-MCNC: 3.2 MG/DL (ref 2.5–4.5)
PLATELET # BLD AUTO: 207 10*3/MM3 (ref 140–500)
PMV BLD AUTO: 10.7 FL (ref 6–12)
POTASSIUM BLD-SCNC: 5.1 MMOL/L (ref 3.5–5.2)
PROT SERPL-MCNC: 7.7 G/DL (ref 6–8.5)
PROT UR QL STRIP: ABNORMAL
PROT UR-MCNC: 49 MG/DL
PTH-INTACT SERPL-MCNC: 44.6 PG/ML (ref 15–65)
RBC # BLD AUTO: 4.09 10*6/MM3 (ref 4.6–6)
RBC # UR: NORMAL /HPF
REF LAB TEST METHOD: NORMAL
SODIUM BLD-SCNC: 144 MMOL/L (ref 136–145)
SP GR UR STRIP: 1.02 (ref 1–1.03)
SQUAMOUS #/AREA URNS HPF: NORMAL /HPF
URATE SERPL-MCNC: 8.1 MG/DL (ref 3.4–7)
UROBILINOGEN UR QL STRIP: ABNORMAL
WBC NRBC COR # BLD: 5.74 10*3/MM3 (ref 4.5–10.7)
WBC UR QL AUTO: NORMAL /HPF

## 2018-09-27 PROCEDURE — 84156 ASSAY OF PROTEIN URINE: CPT

## 2018-09-27 PROCEDURE — 83970 ASSAY OF PARATHORMONE: CPT

## 2018-09-27 PROCEDURE — 80053 COMPREHEN METABOLIC PANEL: CPT

## 2018-09-27 PROCEDURE — 36415 COLL VENOUS BLD VENIPUNCTURE: CPT

## 2018-09-27 PROCEDURE — 81001 URINALYSIS AUTO W/SCOPE: CPT

## 2018-09-27 PROCEDURE — 84100 ASSAY OF PHOSPHORUS: CPT

## 2018-09-27 PROCEDURE — 82570 ASSAY OF URINE CREATININE: CPT

## 2018-09-27 PROCEDURE — 85027 COMPLETE CBC AUTOMATED: CPT

## 2018-09-27 PROCEDURE — 82306 VITAMIN D 25 HYDROXY: CPT

## 2018-09-27 PROCEDURE — 84550 ASSAY OF BLOOD/URIC ACID: CPT

## 2018-09-28 ENCOUNTER — OFFICE VISIT (OUTPATIENT)
Dept: FAMILY MEDICINE CLINIC | Facility: CLINIC | Age: 73
End: 2018-09-28

## 2018-09-28 ENCOUNTER — TREATMENT (OUTPATIENT)
Dept: CARDIAC REHAB | Facility: HOSPITAL | Age: 73
End: 2018-09-28

## 2018-09-28 VITALS
BODY MASS INDEX: 41.08 KG/M2 | OXYGEN SATURATION: 99 % | HEIGHT: 73 IN | HEART RATE: 86 BPM | WEIGHT: 310 LBS | SYSTOLIC BLOOD PRESSURE: 148 MMHG | DIASTOLIC BLOOD PRESSURE: 72 MMHG

## 2018-09-28 DIAGNOSIS — Z95.2 S/P AORTIC VALVE REPLACEMENT: Primary | ICD-10-CM

## 2018-09-28 DIAGNOSIS — Z23 NEED FOR IMMUNIZATION AGAINST INFLUENZA: ICD-10-CM

## 2018-09-28 DIAGNOSIS — Z00.00 MEDICARE ANNUAL WELLNESS VISIT, INITIAL: Primary | ICD-10-CM

## 2018-09-28 DIAGNOSIS — Z12.11 SCREEN FOR COLON CANCER: ICD-10-CM

## 2018-09-28 PROCEDURE — 90662 IIV NO PRSV INCREASED AG IM: CPT | Performed by: FAMILY MEDICINE

## 2018-09-28 PROCEDURE — G0438 PPPS, INITIAL VISIT: HCPCS | Performed by: FAMILY MEDICINE

## 2018-09-28 PROCEDURE — G0008 ADMIN INFLUENZA VIRUS VAC: HCPCS | Performed by: FAMILY MEDICINE

## 2018-09-28 PROCEDURE — 93798 PHYS/QHP OP CAR RHAB W/ECG: CPT

## 2018-09-28 RX ORDER — GLIPIZIDE 5 MG/1
TABLET ORAL
Qty: 270 TABLET | Refills: 1 | Status: SHIPPED | OUTPATIENT
Start: 2018-09-28 | End: 2019-04-11 | Stop reason: SDUPTHER

## 2018-09-28 RX ORDER — ATENOLOL 50 MG/1
50 TABLET ORAL DAILY
Qty: 90 TABLET | Refills: 1 | Status: SHIPPED | OUTPATIENT
Start: 2018-09-28 | End: 2019-04-11 | Stop reason: SDUPTHER

## 2018-09-28 RX ORDER — CLOPIDOGREL BISULFATE 75 MG/1
75 TABLET ORAL DAILY
Qty: 90 TABLET | Refills: 1 | Status: SHIPPED | OUTPATIENT
Start: 2018-09-28 | End: 2019-04-11 | Stop reason: SDUPTHER

## 2018-09-28 RX ORDER — METFORMIN HYDROCHLORIDE 750 MG/1
750 TABLET, EXTENDED RELEASE ORAL 2 TIMES DAILY WITH MEALS
Qty: 180 TABLET | Refills: 1 | Status: SHIPPED | OUTPATIENT
Start: 2018-09-28 | End: 2019-04-11 | Stop reason: SDUPTHER

## 2018-09-28 RX ORDER — FUROSEMIDE 40 MG/1
40 TABLET ORAL DAILY
COMMUNITY
Start: 2018-09-27 | End: 2019-04-11 | Stop reason: SDUPTHER

## 2018-09-28 RX ORDER — ATORVASTATIN CALCIUM 40 MG/1
40 TABLET, FILM COATED ORAL NIGHTLY
Qty: 90 TABLET | Refills: 1 | Status: SHIPPED | OUTPATIENT
Start: 2018-09-28 | End: 2019-04-11 | Stop reason: SDUPTHER

## 2018-10-01 ENCOUNTER — TREATMENT (OUTPATIENT)
Dept: CARDIAC REHAB | Facility: HOSPITAL | Age: 73
End: 2018-10-01

## 2018-10-01 DIAGNOSIS — Z95.2 S/P AORTIC VALVE REPLACEMENT: Primary | ICD-10-CM

## 2018-10-01 PROCEDURE — 93798 PHYS/QHP OP CAR RHAB W/ECG: CPT

## 2018-10-03 ENCOUNTER — TREATMENT (OUTPATIENT)
Dept: CARDIAC REHAB | Facility: HOSPITAL | Age: 73
End: 2018-10-03

## 2018-10-03 DIAGNOSIS — Z95.2 S/P AORTIC VALVE REPLACEMENT: Primary | ICD-10-CM

## 2018-10-03 PROCEDURE — 93798 PHYS/QHP OP CAR RHAB W/ECG: CPT

## 2018-10-05 ENCOUNTER — TREATMENT (OUTPATIENT)
Dept: CARDIAC REHAB | Facility: HOSPITAL | Age: 73
End: 2018-10-05

## 2018-10-05 DIAGNOSIS — Z95.2 S/P AORTIC VALVE REPLACEMENT: Primary | ICD-10-CM

## 2018-10-05 PROCEDURE — 93798 PHYS/QHP OP CAR RHAB W/ECG: CPT

## 2018-10-08 ENCOUNTER — TREATMENT (OUTPATIENT)
Dept: CARDIAC REHAB | Facility: HOSPITAL | Age: 73
End: 2018-10-08

## 2018-10-08 DIAGNOSIS — Z95.2 S/P AORTIC VALVE REPLACEMENT: Primary | ICD-10-CM

## 2018-10-08 PROCEDURE — 93798 PHYS/QHP OP CAR RHAB W/ECG: CPT

## 2018-10-10 ENCOUNTER — TREATMENT (OUTPATIENT)
Dept: CARDIAC REHAB | Facility: HOSPITAL | Age: 73
End: 2018-10-10

## 2018-10-10 DIAGNOSIS — Z95.1 S/P 2-VESSEL CORONARY ARTERY BYPASS: ICD-10-CM

## 2018-10-10 DIAGNOSIS — Z95.2 S/P AORTIC VALVE REPLACEMENT: Primary | ICD-10-CM

## 2018-10-10 PROCEDURE — 93798 PHYS/QHP OP CAR RHAB W/ECG: CPT

## 2018-10-12 ENCOUNTER — TREATMENT (OUTPATIENT)
Dept: CARDIAC REHAB | Facility: HOSPITAL | Age: 73
End: 2018-10-12

## 2018-10-12 DIAGNOSIS — Z95.2 S/P AORTIC VALVE REPLACEMENT: Primary | ICD-10-CM

## 2018-10-12 PROCEDURE — 93798 PHYS/QHP OP CAR RHAB W/ECG: CPT

## 2018-10-15 ENCOUNTER — TREATMENT (OUTPATIENT)
Dept: CARDIAC REHAB | Facility: HOSPITAL | Age: 73
End: 2018-10-15

## 2018-10-15 DIAGNOSIS — Z95.2 S/P AORTIC VALVE REPLACEMENT: Primary | ICD-10-CM

## 2018-10-15 PROCEDURE — 93798 PHYS/QHP OP CAR RHAB W/ECG: CPT

## 2018-10-17 ENCOUNTER — TREATMENT (OUTPATIENT)
Dept: CARDIAC REHAB | Facility: HOSPITAL | Age: 73
End: 2018-10-17

## 2018-10-17 DIAGNOSIS — Z95.2 S/P AORTIC VALVE REPLACEMENT: Primary | ICD-10-CM

## 2018-10-17 PROCEDURE — 93798 PHYS/QHP OP CAR RHAB W/ECG: CPT

## 2018-10-19 ENCOUNTER — TREATMENT (OUTPATIENT)
Dept: CARDIAC REHAB | Facility: HOSPITAL | Age: 73
End: 2018-10-19

## 2018-10-19 DIAGNOSIS — Z95.2 S/P AORTIC VALVE REPLACEMENT: Primary | ICD-10-CM

## 2018-10-19 PROCEDURE — 93798 PHYS/QHP OP CAR RHAB W/ECG: CPT

## 2018-10-22 ENCOUNTER — TREATMENT (OUTPATIENT)
Dept: CARDIAC REHAB | Facility: HOSPITAL | Age: 73
End: 2018-10-22

## 2018-10-22 DIAGNOSIS — Z95.2 S/P AORTIC VALVE REPLACEMENT: Primary | ICD-10-CM

## 2018-10-22 PROCEDURE — 93798 PHYS/QHP OP CAR RHAB W/ECG: CPT

## 2018-10-23 ENCOUNTER — OFFICE VISIT (OUTPATIENT)
Dept: SLEEP MEDICINE | Facility: HOSPITAL | Age: 73
End: 2018-10-23
Attending: PSYCHIATRY & NEUROLOGY

## 2018-10-23 VITALS
HEART RATE: 59 BPM | SYSTOLIC BLOOD PRESSURE: 137 MMHG | HEIGHT: 73 IN | BODY MASS INDEX: 40.56 KG/M2 | WEIGHT: 306 LBS | DIASTOLIC BLOOD PRESSURE: 65 MMHG

## 2018-10-23 DIAGNOSIS — G47.33 OSA (OBSTRUCTIVE SLEEP APNEA): Primary | ICD-10-CM

## 2018-10-23 PROCEDURE — G0463 HOSPITAL OUTPT CLINIC VISIT: HCPCS

## 2018-10-23 NOTE — PROGRESS NOTES
" Rock Maciel Jr.  1945  73 y.o.     DATE OF SERVICE:  10/23/2018       HISTORY OF PRESENT ILLNESS: The patient has history of severe obstructive sleep apnea syndrome, hypertension, diabetes mellitus, and had aortic valve replacement with open heart surgery on July 23, 2018.    The patient has severe obstructive sleep apnea syndrome with apnea-hypopnea index of 60 per sleep hour, minimum SpO2 of 66% with severe hypoxemic desaturations and percent of sleep time.     I have reviewed compliance data.  His compliance data indicate excellent compliance with 97% usage for more than 4 hours with an average usage of 7 hours and 35 minutes with the median CPAP pressure of 9.3 cms of water and residual AHI 1.3. He is compliant and benefiting from it.     Sleep schedule 10 PM to 6 AM and gets about 7-8 hours of sleep.  Sleep latency 30-40 minutes.  [place] Sleepiness Scale score 4.    Review of systems significant for nasal congestion.    PMH, PSH, Medications, allergies, FH, SH are reviewed and updated in the chart.   PHYSICAL EXAMINATION:  Vitals:    10/23/18 0700   BP: 137/65   Pulse: 59   Weight: (!) 139 kg (306 lb)   Height: 185.4 cm (73\")   Body mass index is 40.37 kg/m². neck collar size 21 inches.   HEENT: Normal.   NECK: Supple. No bruits.   CARDIAC: Normal.   LUNGS: Clear to auscultation.   EXTREMITIES: No edema.     IMPRESSION: Patient with severe obstructive sleep apnea syndrome successfully treated with auto CPAP therapy and is compliant and benefiting from it.     1. SHASTA (obstructive sleep apnea)      RECOMMENDATIONS: We will continue auto CPAP therapy.  We'll request for repeating a replacing his auto CPAP machine.   Followup in 1 year.     Andrés Fuentes M.D.  10/23/2018   "

## 2018-10-24 ENCOUNTER — TREATMENT (OUTPATIENT)
Dept: CARDIAC REHAB | Facility: HOSPITAL | Age: 73
End: 2018-10-24

## 2018-10-24 DIAGNOSIS — Z95.1 S/P 2-VESSEL CORONARY ARTERY BYPASS: ICD-10-CM

## 2018-10-24 DIAGNOSIS — Z95.2 S/P AORTIC VALVE REPLACEMENT: Primary | ICD-10-CM

## 2018-10-24 PROCEDURE — 93798 PHYS/QHP OP CAR RHAB W/ECG: CPT

## 2018-10-26 ENCOUNTER — TREATMENT (OUTPATIENT)
Dept: CARDIAC REHAB | Facility: HOSPITAL | Age: 73
End: 2018-10-26

## 2018-10-26 DIAGNOSIS — Z95.2 S/P AORTIC VALVE REPLACEMENT: Primary | ICD-10-CM

## 2018-10-26 PROCEDURE — 93798 PHYS/QHP OP CAR RHAB W/ECG: CPT

## 2018-10-29 ENCOUNTER — TREATMENT (OUTPATIENT)
Dept: CARDIAC REHAB | Facility: HOSPITAL | Age: 73
End: 2018-10-29

## 2018-10-29 DIAGNOSIS — Z95.1 S/P 2-VESSEL CORONARY ARTERY BYPASS: ICD-10-CM

## 2018-10-29 DIAGNOSIS — Z95.2 S/P AORTIC VALVE REPLACEMENT: Primary | ICD-10-CM

## 2018-10-29 PROCEDURE — 93798 PHYS/QHP OP CAR RHAB W/ECG: CPT

## 2018-10-31 ENCOUNTER — TREATMENT (OUTPATIENT)
Dept: CARDIAC REHAB | Facility: HOSPITAL | Age: 73
End: 2018-10-31

## 2018-10-31 DIAGNOSIS — Z95.2 S/P AORTIC VALVE REPLACEMENT: Primary | ICD-10-CM

## 2018-10-31 PROCEDURE — 93798 PHYS/QHP OP CAR RHAB W/ECG: CPT

## 2018-11-02 ENCOUNTER — TREATMENT (OUTPATIENT)
Dept: CARDIAC REHAB | Facility: HOSPITAL | Age: 73
End: 2018-11-02

## 2018-11-02 DIAGNOSIS — Z95.2 S/P AORTIC VALVE REPLACEMENT: Primary | ICD-10-CM

## 2018-11-02 PROCEDURE — 93798 PHYS/QHP OP CAR RHAB W/ECG: CPT

## 2018-11-05 ENCOUNTER — TREATMENT (OUTPATIENT)
Dept: CARDIAC REHAB | Facility: HOSPITAL | Age: 73
End: 2018-11-05

## 2018-11-05 DIAGNOSIS — Z95.2 S/P AORTIC VALVE REPLACEMENT: Primary | ICD-10-CM

## 2018-11-05 PROCEDURE — 93798 PHYS/QHP OP CAR RHAB W/ECG: CPT

## 2018-11-07 ENCOUNTER — TREATMENT (OUTPATIENT)
Dept: CARDIAC REHAB | Facility: HOSPITAL | Age: 73
End: 2018-11-07

## 2018-11-07 DIAGNOSIS — Z95.1 S/P 2-VESSEL CORONARY ARTERY BYPASS: ICD-10-CM

## 2018-11-07 DIAGNOSIS — Z95.2 S/P AORTIC VALVE REPLACEMENT: Primary | ICD-10-CM

## 2018-11-07 PROCEDURE — 93798 PHYS/QHP OP CAR RHAB W/ECG: CPT

## 2018-11-08 ENCOUNTER — LAB (OUTPATIENT)
Dept: LAB | Facility: HOSPITAL | Age: 73
End: 2018-11-08
Attending: INTERNAL MEDICINE

## 2018-11-08 ENCOUNTER — TRANSCRIBE ORDERS (OUTPATIENT)
Dept: LAB | Facility: HOSPITAL | Age: 73
End: 2018-11-08

## 2018-11-08 DIAGNOSIS — I10 ESSENTIAL HYPERTENSION, MALIGNANT: ICD-10-CM

## 2018-11-08 DIAGNOSIS — N18.30 CHRONIC KIDNEY DISEASE, STAGE III (MODERATE) (HCC): Primary | ICD-10-CM

## 2018-11-08 DIAGNOSIS — N18.30 CHRONIC KIDNEY DISEASE, STAGE III (MODERATE) (HCC): ICD-10-CM

## 2018-11-08 LAB
ANION GAP SERPL CALCULATED.3IONS-SCNC: 11.5 MMOL/L
BUN BLD-MCNC: 50 MG/DL (ref 8–23)
BUN/CREAT SERPL: 32.1 (ref 7–25)
CALCIUM SPEC-SCNC: 9.9 MG/DL (ref 8.6–10.5)
CHLORIDE SERPL-SCNC: 105 MMOL/L (ref 98–107)
CO2 SERPL-SCNC: 25.5 MMOL/L (ref 22–29)
CREAT BLD-MCNC: 1.56 MG/DL (ref 0.76–1.27)
DEPRECATED RDW RBC AUTO: 48.5 FL (ref 37–54)
ERYTHROCYTE [DISTWIDTH] IN BLOOD BY AUTOMATED COUNT: 14.5 % (ref 11.5–14.5)
GFR SERPL CREATININE-BSD FRML MDRD: 44 ML/MIN/1.73
GLUCOSE BLD-MCNC: 155 MG/DL (ref 65–99)
HCT VFR BLD AUTO: 38.7 % (ref 40.4–52.2)
HGB BLD-MCNC: 11.8 G/DL (ref 13.7–17.6)
MAGNESIUM SERPL-MCNC: 2.4 MG/DL (ref 1.6–2.4)
MCH RBC QN AUTO: 28.2 PG (ref 27–32.7)
MCHC RBC AUTO-ENTMCNC: 30.5 G/DL (ref 32.6–36.4)
MCV RBC AUTO: 92.4 FL (ref 79.8–96.2)
PLATELET # BLD AUTO: 211 10*3/MM3 (ref 140–500)
PMV BLD AUTO: 10.6 FL (ref 6–12)
POTASSIUM BLD-SCNC: 4.3 MMOL/L (ref 3.5–5.2)
RBC # BLD AUTO: 4.19 10*6/MM3 (ref 4.6–6)
SODIUM BLD-SCNC: 142 MMOL/L (ref 136–145)
WBC NRBC COR # BLD: 6.5 10*3/MM3 (ref 4.5–10.7)

## 2018-11-08 PROCEDURE — 83735 ASSAY OF MAGNESIUM: CPT

## 2018-11-08 PROCEDURE — 80048 BASIC METABOLIC PNL TOTAL CA: CPT

## 2018-11-08 PROCEDURE — 85027 COMPLETE CBC AUTOMATED: CPT

## 2018-11-08 PROCEDURE — 36415 COLL VENOUS BLD VENIPUNCTURE: CPT

## 2018-11-09 ENCOUNTER — TREATMENT (OUTPATIENT)
Dept: CARDIAC REHAB | Facility: HOSPITAL | Age: 73
End: 2018-11-09

## 2018-11-09 DIAGNOSIS — Z95.2 S/P AORTIC VALVE REPLACEMENT: Primary | ICD-10-CM

## 2018-11-09 DIAGNOSIS — Z95.1 S/P 2-VESSEL CORONARY ARTERY BYPASS: ICD-10-CM

## 2018-11-09 PROCEDURE — 93798 PHYS/QHP OP CAR RHAB W/ECG: CPT

## 2018-11-12 ENCOUNTER — TREATMENT (OUTPATIENT)
Dept: CARDIAC REHAB | Facility: HOSPITAL | Age: 73
End: 2018-11-12

## 2018-11-12 DIAGNOSIS — Z95.2 S/P AORTIC VALVE REPLACEMENT: Primary | ICD-10-CM

## 2018-11-12 PROCEDURE — 93798 PHYS/QHP OP CAR RHAB W/ECG: CPT

## 2018-11-14 ENCOUNTER — TREATMENT (OUTPATIENT)
Dept: CARDIAC REHAB | Facility: HOSPITAL | Age: 73
End: 2018-11-14

## 2018-11-14 DIAGNOSIS — Z95.2 S/P AORTIC VALVE REPLACEMENT: Primary | ICD-10-CM

## 2018-11-14 PROCEDURE — 93798 PHYS/QHP OP CAR RHAB W/ECG: CPT

## 2018-11-16 ENCOUNTER — TREATMENT (OUTPATIENT)
Dept: CARDIAC REHAB | Facility: HOSPITAL | Age: 73
End: 2018-11-16

## 2018-11-16 DIAGNOSIS — Z95.1 S/P 2-VESSEL CORONARY ARTERY BYPASS: ICD-10-CM

## 2018-11-16 DIAGNOSIS — Z95.2 S/P AORTIC VALVE REPLACEMENT: Primary | ICD-10-CM

## 2018-11-16 PROCEDURE — 93798 PHYS/QHP OP CAR RHAB W/ECG: CPT

## 2018-11-19 ENCOUNTER — APPOINTMENT (OUTPATIENT)
Dept: CARDIAC REHAB | Facility: HOSPITAL | Age: 73
End: 2018-11-19

## 2018-11-21 ENCOUNTER — APPOINTMENT (OUTPATIENT)
Dept: CARDIAC REHAB | Facility: HOSPITAL | Age: 73
End: 2018-11-21

## 2018-11-23 ENCOUNTER — APPOINTMENT (OUTPATIENT)
Dept: CARDIAC REHAB | Facility: HOSPITAL | Age: 73
End: 2018-11-23

## 2018-11-26 ENCOUNTER — APPOINTMENT (OUTPATIENT)
Dept: CARDIAC REHAB | Facility: HOSPITAL | Age: 73
End: 2018-11-26

## 2018-11-28 ENCOUNTER — APPOINTMENT (OUTPATIENT)
Dept: CARDIAC REHAB | Facility: HOSPITAL | Age: 73
End: 2018-11-28

## 2018-11-30 ENCOUNTER — APPOINTMENT (OUTPATIENT)
Dept: CARDIAC REHAB | Facility: HOSPITAL | Age: 73
End: 2018-11-30

## 2018-12-03 ENCOUNTER — APPOINTMENT (OUTPATIENT)
Dept: CARDIAC REHAB | Facility: HOSPITAL | Age: 73
End: 2018-12-03

## 2018-12-05 ENCOUNTER — APPOINTMENT (OUTPATIENT)
Dept: CARDIAC REHAB | Facility: HOSPITAL | Age: 73
End: 2018-12-05

## 2018-12-07 ENCOUNTER — APPOINTMENT (OUTPATIENT)
Dept: CARDIAC REHAB | Facility: HOSPITAL | Age: 73
End: 2018-12-07

## 2018-12-10 ENCOUNTER — OFFICE VISIT (OUTPATIENT)
Dept: CARDIOLOGY | Facility: CLINIC | Age: 73
End: 2018-12-10

## 2018-12-10 VITALS
SYSTOLIC BLOOD PRESSURE: 179 MMHG | HEIGHT: 73 IN | BODY MASS INDEX: 40.29 KG/M2 | HEART RATE: 54 BPM | WEIGHT: 304 LBS | DIASTOLIC BLOOD PRESSURE: 69 MMHG

## 2018-12-10 DIAGNOSIS — E78.5 HYPERLIPIDEMIA, UNSPECIFIED HYPERLIPIDEMIA TYPE: ICD-10-CM

## 2018-12-10 DIAGNOSIS — I44.4 LAFB (LEFT ANTERIOR FASCICULAR BLOCK): Primary | ICD-10-CM

## 2018-12-10 DIAGNOSIS — I10 ESSENTIAL HYPERTENSION: ICD-10-CM

## 2018-12-10 DIAGNOSIS — I35.0 NONRHEUMATIC AORTIC VALVE STENOSIS: ICD-10-CM

## 2018-12-10 DIAGNOSIS — E66.09 CLASS 1 OBESITY DUE TO EXCESS CALORIES WITHOUT SERIOUS COMORBIDITY WITH BODY MASS INDEX (BMI) OF 30.0 TO 30.9 IN ADULT: ICD-10-CM

## 2018-12-10 PROCEDURE — 99213 OFFICE O/P EST LOW 20 MIN: CPT | Performed by: INTERNAL MEDICINE

## 2018-12-10 RX ORDER — AMLODIPINE BESYLATE 5 MG/1
TABLET ORAL
COMMUNITY
Start: 2018-12-03 | End: 2019-04-11 | Stop reason: SDUPTHER

## 2018-12-10 NOTE — PROGRESS NOTES
Subjective:        Rock Maciel Jr. is a 73 y.o. male who here for follow up    CC  The follow-up for the hypertension that bundle branch block aortic stenosis and hyperlipidemia  HPI  73-year-old male with a known history of benign essential arterial hypertension, aortic stenosis hyperlipidemia here for the follow-up2     Problem List Items Addressed This Visit        Cardiovascular and Mediastinum    Essential hypertension    Relevant Medications    amLODIPine (NORVASC) 5 MG tablet    LAFB (left anterior fascicular block) - Primary    Nonrheumatic aortic valve stenosis    Relevant Medications    amLODIPine (NORVASC) 5 MG tablet    Hyperlipidemia       Digestive    Class 1 obesity due to excess calories without serious comorbidity with body mass index (BMI) of 30.0 to 30.9 in adult        .    The following portions of the patient's history were reviewed and updated as appropriate: allergies, current medications, past family history, past medical history, past social history, past surgical history and problem list.    Past Medical History:   Diagnosis Date   • Allergic rhinitis    • Bronchitis    • Coronary artery disease    • Diabetes mellitus (CMS/[facility]) 2001   • DM (diabetes mellitus) (CMS/[facility])    • Encounter for annual health examination 05/06/2014    Annual Health Assessment   • Gout    • Hiatal hernia    • History of MRSA infection     RIGHT FOOT 2010   • Hyperlipidemia    • Hypertension    • Murmur    • Pneumothorax on right    • Sleep apnea     pt wears CPAP at night   • Wellness examination 06/24/2015    Annual Wellness Visit     reports that  has never smoked. he has never used smokeless tobacco. He reports that he drinks alcohol. He reports that he does not use drugs.   Family History   Problem Relation Age of Onset   • Hypertension Father        Review of Systems  Constitutional: No wt loss, fever, fatigue  Gastrointestinal: No nausea, abdominal pain  Behavioral/Psych: No insomnia or anxiety    "Cardiovascular no chest pains or tightness in chest  Objective:                 Physical Exam  /69 (BP Location: Left arm, Patient Position: Sitting)   Pulse 54   Ht 185.4 cm (73\")   Wt (!) 138 kg (304 lb)   BMI 40.11 kg/m²     General appearance: NAD, conversant   Eyes: anicteric sclerae, moist conjunctivae; no lid-lag; PERRLA   HENT: Atraumatic; oropharynx clear with moist mucous membranes and no mucosal ulcerations;  normal hard and soft palate   Neck: Trachea midline; FROM, supple, no thyromegaly or lymphadenopathy   Lungs: CTA, with normal respiratory effort and no intercostal retractions   CV: S1-S2 regular, SYS murmurs, no rub, no gallop   Abdomen: Soft, non-tender; no masses or HSM   Extremities: No peripheral edema or extremity lymphadenopathy  Skin: Normal temperature, turgor and texture; no rash, ulcers or subcutaneous nodules   Psych: Appropriate affect, alert and oriented to person, place and time           Procedures      Echocardiogram:        Current Outpatient Medications:   •  acetaminophen (TYLENOL) 325 MG tablet, Take 2 tablets by mouth Every 4 (Four) Hours As Needed for Mild Pain  or Moderate Pain ., Disp: , Rfl:   •  amLODIPine (NORVASC) 5 MG tablet, , Disp: , Rfl:   •  atenolol (TENORMIN) 50 MG tablet, Take 1 tablet by mouth Daily., Disp: 90 tablet, Rfl: 1  •  atorvastatin (LIPITOR) 40 MG tablet, Take 1 tablet by mouth Every Night., Disp: 90 tablet, Rfl: 1  •  Cholecalciferol (VITAMIN D3 PO), Take 1 capsule by mouth Daily. PT HOLDING FOR SURGERY, Disp: , Rfl:   •  clopidogrel (PLAVIX) 75 MG tablet, Take 1 tablet by mouth Daily., Disp: 90 tablet, Rfl: 1  •  furosemide (LASIX) 40 MG tablet, , Disp: , Rfl:   •  glipiZIDE (GLUCOTROL) 5 MG tablet, 2 in am and 1 at 5 pm, Disp: 270 tablet, Rfl: 1  •  glucose blood (ACCU-CHEK BOB PLUS) test strip, Use as instructed, Disp: 200 each, Rfl: 12  •  Lancets (ACCU-CHEK SOFT TOUCH) lancets, ACCU-CHEK SOFTCLIX LANCETS, Disp: 200 each, Rfl: 1  •  " metFORMIN ER (GLUCOPHAGE-XR) 750 MG 24 hr tablet, Take 1 tablet by mouth 2 (Two) Times a Day With Meals., Disp: 180 tablet, Rfl: 1  •  Omega-3 Fatty Acids (FISH OIL) 1000 MG capsule capsule, Take 1,000 mg by mouth Daily With Breakfast. PT HOLDING FOR SURGERY, Disp: , Rfl:   •  vitamin C (ASCORBIC ACID) 250 MG tablet, Take 250 mg by mouth Daily., Disp: , Rfl:    Assessment:        Patient Active Problem List   Diagnosis   • Abnormal ECG   • Arteriosclerosis of coronary artery   • Cardiac murmur   • Essential hypertension   • LAFB (left anterior fascicular block)   • Bundle branch block, right   • Neuropathic arthropathy   • Diabetes mellitus (CMS/HCC)   • SHASTA (obstructive sleep apnea)   • Gain of weight   • Gout   • Class 1 obesity due to excess calories without serious comorbidity with body mass index (BMI) of 30.0 to 30.9 in adult   • Chronic venous insufficiency   • Nonrheumatic aortic valve stenosis   • Carotid stenosis, asymptomatic   • Bradycardia   • Hyperlipidemia   • Bilateral carotid artery disease (CMS/HCC)   • Abnormal cardiovascular stress test   • Renal insufficiency   • S/P AVR               Plan:            ICD-10-CM ICD-9-CM   1. LAFB (left anterior fascicular block) I44.4 426.2   2. Hyperlipidemia, unspecified hyperlipidemia type E78.5 272.4   3. Class 1 obesity due to excess calories without serious comorbidity with body mass index (BMI) of 30.0 to 30.9 in adult E66.09 278.00    Z68.30 V85.30   4. Essential hypertension I10 401.9   5. Nonrheumatic aortic valve stenosis I35.0 424.1     1. LAFB (left anterior fascicular block)  No change    2. Hyperlipidemia, unspecified hyperlipidemia type  Counseling:    3. Class 1 obesity due to excess calories without serious comorbidity with body mass index (BMI) of 30.0 to 30.9 in adult  Counseling has been done    4. Essential hypertension  Blood pressure is high will keep an eye    5. Nonrheumatic aortic valve stenosis  Asymptomatic     BP RUNNING HIGH    CV  STABLE    SEE IN 1 YR  COUNSELING:    Rock Maciel Jr.Counseling was given to patient for the following topics: diagnostic results, risk factor reductions, impressions, risks and benefits of treatment options and importance of treatment compliance .       SMOKING COUNSELING:    Counseling given: Not Answered      EMR Dragon/Transcription disclaimer:   Much of this encounter note is an electronic transcription/translation of spoken language to printed text. The electronic translation of spoken language may permit erroneous, or at times, nonsensical words or phrases to be inadvertently transcribed; Although I have reviewed the note for such errors, some may still exist.

## 2019-02-14 ENCOUNTER — LAB (OUTPATIENT)
Dept: LAB | Facility: HOSPITAL | Age: 74
End: 2019-02-14

## 2019-02-14 ENCOUNTER — TRANSCRIBE ORDERS (OUTPATIENT)
Dept: ADMINISTRATIVE | Facility: HOSPITAL | Age: 74
End: 2019-02-14

## 2019-02-14 DIAGNOSIS — I10 ESSENTIAL HYPERTENSION, MALIGNANT: ICD-10-CM

## 2019-02-14 DIAGNOSIS — N18.30 CHRONIC KIDNEY DISEASE, STAGE III (MODERATE) (HCC): Primary | ICD-10-CM

## 2019-02-14 DIAGNOSIS — N18.30 CHRONIC KIDNEY DISEASE, STAGE III (MODERATE) (HCC): ICD-10-CM

## 2019-02-14 LAB
ANION GAP SERPL CALCULATED.3IONS-SCNC: 11.5 MMOL/L
BUN BLD-MCNC: 46 MG/DL (ref 8–23)
BUN/CREAT SERPL: 31.5 (ref 7–25)
CALCIUM SPEC-SCNC: 9.7 MG/DL (ref 8.6–10.5)
CHLORIDE SERPL-SCNC: 102 MMOL/L (ref 98–107)
CO2 SERPL-SCNC: 26.5 MMOL/L (ref 22–29)
CREAT BLD-MCNC: 1.46 MG/DL (ref 0.76–1.27)
DEPRECATED RDW RBC AUTO: 48.8 FL (ref 37–54)
ERYTHROCYTE [DISTWIDTH] IN BLOOD BY AUTOMATED COUNT: 14.9 % (ref 12.3–15.4)
GFR SERPL CREATININE-BSD FRML MDRD: 47 ML/MIN/1.73
GLUCOSE BLD-MCNC: 174 MG/DL (ref 65–99)
HCT VFR BLD AUTO: 38.1 % (ref 37.5–51)
HGB BLD-MCNC: 11.9 G/DL (ref 13–17.7)
MAGNESIUM SERPL-MCNC: 2.2 MG/DL (ref 1.6–2.4)
MCH RBC QN AUTO: 28 PG (ref 26.6–33)
MCHC RBC AUTO-ENTMCNC: 31.2 G/DL (ref 31.5–35.7)
MCV RBC AUTO: 89.6 FL (ref 79–97)
PLATELET # BLD AUTO: 192 10*3/MM3 (ref 140–450)
PMV BLD AUTO: 10.7 FL (ref 6–12)
POTASSIUM BLD-SCNC: 4.7 MMOL/L (ref 3.5–5.2)
RBC # BLD AUTO: 4.25 10*6/MM3 (ref 4.14–5.8)
SODIUM BLD-SCNC: 140 MMOL/L (ref 136–145)
WBC NRBC COR # BLD: 7.01 10*3/MM3 (ref 3.4–10.8)

## 2019-02-14 PROCEDURE — 36415 COLL VENOUS BLD VENIPUNCTURE: CPT

## 2019-02-14 PROCEDURE — 83735 ASSAY OF MAGNESIUM: CPT

## 2019-02-14 PROCEDURE — 80048 BASIC METABOLIC PNL TOTAL CA: CPT

## 2019-02-14 PROCEDURE — 85027 COMPLETE CBC AUTOMATED: CPT

## 2019-03-14 ENCOUNTER — TRANSCRIBE ORDERS (OUTPATIENT)
Dept: ADMINISTRATIVE | Facility: HOSPITAL | Age: 74
End: 2019-03-14

## 2019-03-14 DIAGNOSIS — N18.4 CHRONIC KIDNEY DISEASE, STAGE IV (SEVERE) (HCC): Primary | ICD-10-CM

## 2019-04-02 ENCOUNTER — HOSPITAL ENCOUNTER (OUTPATIENT)
Dept: ULTRASOUND IMAGING | Facility: HOSPITAL | Age: 74
Discharge: HOME | End: 2019-04-02
Admitting: NURSE PRACTITIONER

## 2019-04-02 DIAGNOSIS — N18.4 CHRONIC KIDNEY DISEASE, STAGE IV (SEVERE) (HCC): ICD-10-CM

## 2019-04-02 PROCEDURE — 76775 US EXAM ABDO BACK WALL LIM: CPT

## 2019-04-11 ENCOUNTER — OFFICE VISIT (OUTPATIENT)
Dept: FAMILY MEDICINE CLINIC | Facility: CLINIC | Age: 74
End: 2019-04-11

## 2019-04-11 VITALS
RESPIRATION RATE: 17 BRPM | DIASTOLIC BLOOD PRESSURE: 74 MMHG | HEART RATE: 64 BPM | WEIGHT: 314 LBS | TEMPERATURE: 98.7 F | BODY MASS INDEX: 41.62 KG/M2 | SYSTOLIC BLOOD PRESSURE: 142 MMHG | HEIGHT: 73 IN | OXYGEN SATURATION: 95 %

## 2019-04-11 DIAGNOSIS — L98.9 SCALP LESION: ICD-10-CM

## 2019-04-11 DIAGNOSIS — E08.00 DIABETES MELLITUS DUE TO UNDERLYING CONDITION WITH HYPEROSMOLARITY WITHOUT COMA, WITHOUT LONG-TERM CURRENT USE OF INSULIN (HCC): Primary | ICD-10-CM

## 2019-04-11 PROBLEM — S98.131A AMPUTATED TOE OF RIGHT FOOT (HCC): Status: ACTIVE | Noted: 2019-04-11

## 2019-04-11 PROBLEM — G60.0 CHARCOT-MARIE-TOOTH DISEASE-LIKE DEFORMITY OF FOOT: Status: ACTIVE | Noted: 2019-04-11

## 2019-04-11 LAB — HBA1C MFR BLD: 6.18 % (ref 4.8–5.6)

## 2019-04-11 PROCEDURE — 99214 OFFICE O/P EST MOD 30 MIN: CPT | Performed by: FAMILY MEDICINE

## 2019-04-11 RX ORDER — METFORMIN HYDROCHLORIDE 750 MG/1
750 TABLET, EXTENDED RELEASE ORAL
Qty: 270 TABLET | Refills: 1 | Status: SHIPPED | OUTPATIENT
Start: 2019-04-11 | End: 2019-05-18 | Stop reason: HOSPADM

## 2019-04-11 RX ORDER — ATENOLOL 50 MG/1
50 TABLET ORAL DAILY
Qty: 180 TABLET | Refills: 1 | Status: SHIPPED | OUTPATIENT
Start: 2019-04-11 | End: 2019-05-18 | Stop reason: HOSPADM

## 2019-04-11 RX ORDER — AMLODIPINE BESYLATE 5 MG/1
5 TABLET ORAL DAILY
Qty: 90 TABLET | Refills: 1 | Status: SHIPPED | OUTPATIENT
Start: 2019-04-11 | End: 2019-05-18 | Stop reason: HOSPADM

## 2019-04-11 RX ORDER — GLIPIZIDE 5 MG/1
TABLET ORAL
Qty: 180 TABLET | Refills: 1 | Status: SHIPPED | OUTPATIENT
Start: 2019-04-11 | End: 2020-03-19 | Stop reason: SDUPTHER

## 2019-04-11 RX ORDER — CLOPIDOGREL BISULFATE 75 MG/1
75 TABLET ORAL DAILY
Qty: 90 TABLET | Refills: 1 | Status: SHIPPED | OUTPATIENT
Start: 2019-04-11 | End: 2020-05-22

## 2019-04-11 RX ORDER — ATORVASTATIN CALCIUM 40 MG/1
40 TABLET, FILM COATED ORAL NIGHTLY
Qty: 90 TABLET | Refills: 1 | Status: SHIPPED | OUTPATIENT
Start: 2019-04-11 | End: 2020-03-19 | Stop reason: SDUPTHER

## 2019-04-11 RX ORDER — FUROSEMIDE 40 MG/1
40 TABLET ORAL DAILY
Qty: 90 TABLET | Refills: 1 | Status: SHIPPED | OUTPATIENT
Start: 2019-04-11 | End: 2019-05-18 | Stop reason: HOSPADM

## 2019-04-11 NOTE — PROGRESS NOTES
"Chief Complaint   Patient presents with   • Diabetes     6 mth f/u        Subjective   Rock Maciel Jr. is a 73 y.o. male.     History of Present Illness   DM  He had been running in the 90s and now in the 120s. He has been having a little ice cream, thinks he has been eating right for the most part.   He is on glipizide TID and metformin BID. Gets nails trimmed and foot exam with podiatry. Also sees clive and dominic for eye exams. He follows up 6 months and 4 months respectively. He does not exercise but he is getting an exercise bike, opening a gym close to him.     Has skin lesion with hx of cancer on his scalp. Now having a different spot. He has been using antibiotic ointment, after shower he has some bleeding sometimes.     Did cologuard in 10/2018.    The following portions of the patient's history were reviewed and updated as appropriate: allergies, current medications, past medical history, past social history and problem list.    Review of Systems   Respiratory: Negative.    Cardiovascular: Negative.    Endocrine: Negative.    Neurological: Negative.        Vitals:    04/11/19 1032   BP: 142/74   BP Location: Left arm   Patient Position: Sitting   Cuff Size: Adult   Pulse: 64   Resp: 17   Temp: 98.7 °F (37.1 °C)   TempSrc: Oral   SpO2: 95%   Weight: (!) 142 kg (314 lb)   Height: 185.4 cm (73\")     Repeat blood pressure was 138/74.    Objective   Physical Exam   Constitutional: He is oriented to person, place, and time. He appears well-nourished. No distress.   Eyes: Conjunctivae are normal. Right eye exhibits no discharge. Left eye exhibits no discharge. No scleral icterus.   Cardiovascular: Normal rate, regular rhythm, normal heart sounds and intact distal pulses. Exam reveals no gallop and no friction rub.   No murmur heard.  Pulmonary/Chest: Effort normal and breath sounds normal. No respiratory distress. He has no wheezes.   Musculoskeletal: He exhibits no edema.   Neurological: He is alert and " oriented to person, place, and time.   Skin:   Two annular/papular lesion on the back of the scalp. Both are about 1 cm in diameter. Pearly in appearance, and tiny vascularities throughout. The lesion on the pt's left side as some small bleeding.   Psychiatric: He has a normal mood and affect. His behavior is normal.   Vitals reviewed.      Assessment/Plan   Rock was seen today for diabetes.    Diagnoses and all orders for this visit:    Diabetes mellitus due to underlying condition with hyperosmolarity without coma, without long-term current use of insulin (CMS/[facility])  -     MicroAlbumin, Urine, Random - Urine, Clean Catch  -     Hemoglobin A1c    Scalp lesion    Other orders  -     amLODIPine (NORVASC) 5 MG tablet; Take 1 tablet by mouth Daily.  -     atenolol (TENORMIN) 50 MG tablet; Take 1 tablet by mouth Daily.  -     atorvastatin (LIPITOR) 40 MG tablet; Take 1 tablet by mouth Every Night.  -     clopidogrel (PLAVIX) 75 MG tablet; Take 1 tablet by mouth Daily.  -     furosemide (LASIX) 40 MG tablet; Take 1 tablet by mouth Daily.  -     glipiZIDE (GLUCOTROL) 5 MG tablet; 1 in am and 1 at 5 pm  -     glucose blood (ACCU-CHEK BOB PLUS) test strip; Use as instructed  -     Lancets (ACCU-CHEK SOFT TOUCH) lancets; ACCU-CHEK SOFTCLIX LANCETS  -     metFORMIN ER (GLUCOPHAGE-XR) 750 MG 24 hr tablet; Take 1 tablet by mouth 3 (Three) Times a Day With Meals.    Need for diabetic shoes.  Reviewed notes from podiatry and patient has history of Charcot deformity bilaterally, right foot ulceration status post amputation of fourth and fifth digit on the right.  He gets regular eye exams with been in [place].  He has follow-up scheduled for May sees them biannually.  Reports mildly elevated blood glucose recently.  He is going to pick more attention to his diet and keep a log to see what might be causing this.  Encouraged activity he is going to start riding his stationary bike at home.  Given the risk of glipizide has for  hypoglycemia we will reduce the dose of this but to counter the more elevated sugars recently though still within good control increase the metformin.  Looking back at kidney function after doing this will likely not increase to 3 times daily and 1 call patient with lab results we will notify him to just take glipizide twice daily and the metformin twice daily.    Next visit his Medicare wellness will be due.

## 2019-04-18 ENCOUNTER — TELEPHONE (OUTPATIENT)
Dept: FAMILY MEDICINE CLINIC | Facility: CLINIC | Age: 74
End: 2019-04-18

## 2019-04-18 NOTE — TELEPHONE ENCOUNTER
Pt called about Metformin and questions that [facility] had and they will not fill medications until talking with you. Please call [phone] they are concerned with the change in medications.

## 2019-05-05 ENCOUNTER — APPOINTMENT (OUTPATIENT)
Dept: GENERAL RADIOLOGY | Facility: HOSPITAL | Age: 74
End: 2019-05-05

## 2019-05-05 ENCOUNTER — HOSPITAL ENCOUNTER (INPATIENT)
Facility: HOSPITAL | Age: 74
LOS: 12 days | Discharge: NURSING, REHAB OR LONG-TERM CARE | End: 2019-05-18
Attending: EMERGENCY MEDICINE | Admitting: ORTHOPAEDIC SURGERY

## 2019-05-05 DIAGNOSIS — M79.605 LEFT LEG PAIN: ICD-10-CM

## 2019-05-05 DIAGNOSIS — R53.1 GENERALIZED WEAKNESS: ICD-10-CM

## 2019-05-05 DIAGNOSIS — Z95.1 S/P CABG X 2: ICD-10-CM
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
## 2019-05-05 DIAGNOSIS — Z74.09 IMMOBILITY: ICD-10-CM

## 2019-05-05 PROCEDURE — 73552 X-RAY EXAM OF FEMUR 2/>: CPT

## 2019-05-05 PROCEDURE — 99284 EMERGENCY DEPT VISIT MOD MDM: CPT

## 2019-05-05 RX ORDER — SODIUM CHLORIDE 0.9 % (FLUSH) 0.9 %
10 SYRINGE (ML) INJECTION AS NEEDED
Status: DISCONTINUED | OUTPATIENT
Start: 2019-05-05 | End: 2019-05-19 | Stop reason: HOSPADM

## 2019-05-06 ENCOUNTER — APPOINTMENT (OUTPATIENT)
Dept: CT IMAGING | Facility: HOSPITAL | Age: 74
End: 2019-05-06
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
## 2019-05-06 PROBLEM — Z95.3 H/O AORTIC VALVE REPLACEMENT WITH PORCINE VALVE: Status: ACTIVE | Noted: 2018-09-10

## 2019-05-06 PROBLEM — N18.30 CKD (CHRONIC KIDNEY DISEASE) STAGE 3, GFR 30-59 ML/MIN (HCC): Status: ACTIVE | Noted: 2019-05-06

## 2019-05-06 PROBLEM — M62.82 NON-TRAUMATIC RHABDOMYOLYSIS: Status: ACTIVE | Noted: 2019-05-06

## 2019-05-06 PROBLEM — Z95.1 S/P CABG X 2: Status: ACTIVE | Noted: 2019-05-06

## 2019-05-06 LAB
ALBUMIN SERPL-MCNC: 4.1 G/DL (ref 3.5–5.2)
ALBUMIN/GLOB SERPL: 1.1 G/DL
ALP SERPL-CCNC: 117 U/L (ref 39–117)
ALT SERPL W P-5'-P-CCNC: 14 U/L (ref 1–41)
ANION GAP SERPL CALCULATED.3IONS-SCNC: 11.5 MMOL/L
ANION GAP SERPL CALCULATED.3IONS-SCNC: 14.9 MMOL/L
AST SERPL-CCNC: 23 U/L (ref 1–40)
BASOPHILS # BLD AUTO: 0.03 10*3/MM3 (ref 0–0.2)
BASOPHILS NFR BLD AUTO: 0.2 % (ref 0–1.5)
BILIRUB SERPL-MCNC: 0.6 MG/DL (ref 0.2–1.2)
BUN BLD-MCNC: 45 MG/DL (ref 8–23)
BUN BLD-MCNC: 49 MG/DL (ref 8–23)
BUN/CREAT SERPL: 32 (ref 7–25)
BUN/CREAT SERPL: 32.6 (ref 7–25)
CALCIUM SPEC-SCNC: 8.8 MG/DL (ref 8.6–10.5)
CALCIUM SPEC-SCNC: 9.2 MG/DL (ref 8.6–10.5)
CHLORIDE SERPL-SCNC: 105 MMOL/L (ref 98–107)
CHLORIDE SERPL-SCNC: 106 MMOL/L (ref 98–107)
CK SERPL-CCNC: 510 U/L (ref 20–200)
CK SERPL-CCNC: 517 U/L (ref 20–200)
CO2 SERPL-SCNC: 22.1 MMOL/L (ref 22–29)
CO2 SERPL-SCNC: 24.5 MMOL/L (ref 22–29)
CREAT BLD-MCNC: 1.38 MG/DL (ref 0.76–1.27)
CREAT BLD-MCNC: 1.53 MG/DL (ref 0.76–1.27)
DEPRECATED RDW RBC AUTO: 45.9 FL (ref 37–54)
DEPRECATED RDW RBC AUTO: 46.2 FL (ref 37–54)
EOSINOPHIL # BLD AUTO: 0 10*3/MM3 (ref 0–0.4)
EOSINOPHIL NFR BLD AUTO: 0 % (ref 0.3–6.2)
ERYTHROCYTE [DISTWIDTH] IN BLOOD BY AUTOMATED COUNT: 14.3 % (ref 12.3–15.4)
ERYTHROCYTE [DISTWIDTH] IN BLOOD BY AUTOMATED COUNT: 14.4 % (ref 12.3–15.4)
GFR SERPL CREATININE-BSD FRML MDRD: 45 ML/MIN/1.73
GFR SERPL CREATININE-BSD FRML MDRD: 50 ML/MIN/1.73
GLOBULIN UR ELPH-MCNC: 3.7 GM/DL
GLUCOSE BLD-MCNC: 134 MG/DL (ref 65–99)
GLUCOSE BLD-MCNC: 156 MG/DL (ref 65–99)
GLUCOSE BLDC GLUCOMTR-MCNC: 130 MG/DL (ref 70–130)
GLUCOSE BLDC GLUCOMTR-MCNC: 160 MG/DL (ref 70–130)
GLUCOSE BLDC GLUCOMTR-MCNC: 175 MG/DL (ref 70–130)
GLUCOSE BLDC GLUCOMTR-MCNC: 201 MG/DL (ref 70–130)
HBA1C MFR BLD: 6 % (ref 4.8–5.6)
HCT VFR BLD AUTO: 34.7 % (ref 37.5–51)
HCT VFR BLD AUTO: 39.8 % (ref 37.5–51)
HGB BLD-MCNC: 11 G/DL (ref 13–17.7)
HGB BLD-MCNC: 12.4 G/DL (ref 13–17.7)
IMM GRANULOCYTES # BLD AUTO: 0.05 10*3/MM3 (ref 0–0.05)
IMM GRANULOCYTES NFR BLD AUTO: 0.4 % (ref 0–0.5)
LYMPHOCYTES # BLD AUTO: 0.67 10*3/MM3 (ref 0.7–3.1)
LYMPHOCYTES NFR BLD AUTO: 4.9 % (ref 19.6–45.3)
MCH RBC QN AUTO: 27.9 PG (ref 26.6–33)
MCH RBC QN AUTO: 28.2 PG (ref 26.6–33)
MCHC RBC AUTO-ENTMCNC: 31.2 G/DL (ref 31.5–35.7)
MCHC RBC AUTO-ENTMCNC: 31.7 G/DL (ref 31.5–35.7)
MCV RBC AUTO: 89 FL (ref 79–97)
MCV RBC AUTO: 89.4 FL (ref 79–97)
MONOCYTES # BLD AUTO: 0.6 10*3/MM3 (ref 0.1–0.9)
MONOCYTES NFR BLD AUTO: 4.4 % (ref 5–12)
NEUTROPHILS # BLD AUTO: 12.26 10*3/MM3 (ref 1.7–7)
NEUTROPHILS NFR BLD AUTO: 90.1 % (ref 42.7–76)
NRBC BLD AUTO-RTO: 0 /100 WBC (ref 0–0.2)
PLATELET # BLD AUTO: 197 10*3/MM3 (ref 140–450)
PLATELET # BLD AUTO: 221 10*3/MM3 (ref 140–450)
PMV BLD AUTO: 10.3 FL (ref 6–12)
PMV BLD AUTO: 10.9 FL (ref 6–12)
POTASSIUM BLD-SCNC: 4 MMOL/L (ref 3.5–5.2)
POTASSIUM BLD-SCNC: 4.3 MMOL/L (ref 3.5–5.2)
PROT SERPL-MCNC: 7.8 G/DL (ref 6–8.5)
RBC # BLD AUTO: 3.9 10*6/MM3 (ref 4.14–5.8)
RBC # BLD AUTO: 4.45 10*6/MM3 (ref 4.14–5.8)
SODIUM BLD-SCNC: 142 MMOL/L (ref 136–145)
SODIUM BLD-SCNC: 142 MMOL/L (ref 136–145)
WBC NRBC COR # BLD: 13.61 10*3/MM3 (ref 3.4–10.8)
WBC NRBC COR # BLD: 8.17 10*3/MM3 (ref 3.4–10.8)

## 2019-05-06 PROCEDURE — 85027 COMPLETE CBC AUTOMATED: CPT | Performed by: NURSE PRACTITIONER

## 2019-05-06 PROCEDURE — 83036 HEMOGLOBIN GLYCOSYLATED A1C: CPT | Performed by: NURSE PRACTITIONER

## 2019-05-06 PROCEDURE — G0378 HOSPITAL OBSERVATION PER HR: HCPCS

## 2019-05-06 PROCEDURE — 73700 CT LOWER EXTREMITY W/O DYE: CPT

## 2019-05-06 PROCEDURE — 97162 PT EVAL MOD COMPLEX 30 MIN: CPT

## 2019-05-06 PROCEDURE — 82550 ASSAY OF CK (CPK): CPT | Performed by: NURSE PRACTITIONER

## 2019-05-06 PROCEDURE — 80053 COMPREHEN METABOLIC PANEL: CPT | Performed by: EMERGENCY MEDICINE

## 2019-05-06 PROCEDURE — 82962 GLUCOSE BLOOD TEST: CPT

## 2019-05-06 PROCEDURE — 97530 THERAPEUTIC ACTIVITIES: CPT

## 2019-05-06 PROCEDURE — 82550 ASSAY OF CK (CPK): CPT | Performed by: EMERGENCY MEDICINE

## 2019-05-06 PROCEDURE — 63710000001 INSULIN LISPRO (HUMAN) PER 5 UNITS: Performed by: NURSE PRACTITIONER

## 2019-05-06 PROCEDURE — 85025 COMPLETE CBC W/AUTO DIFF WBC: CPT | Performed by: EMERGENCY MEDICINE

## 2019-05-06 RX ORDER — SODIUM CHLORIDE 0.9 % (FLUSH) 0.9 %
3 SYRINGE (ML) INJECTION EVERY 12 HOURS SCHEDULED
Status: DISCONTINUED | OUTPATIENT
Start: 2019-05-06 | End: 2019-05-19 | Stop reason: HOSPADM

## 2019-05-06 RX ORDER — ATORVASTATIN CALCIUM 20 MG/1
40 TABLET, FILM COATED ORAL NIGHTLY
Status: DISCONTINUED | OUTPATIENT
Start: 2019-05-06 | End: 2019-05-19 | Stop reason: HOSPADM

## 2019-05-06 RX ORDER — ONDANSETRON 4 MG/1
4 TABLET, FILM COATED ORAL EVERY 6 HOURS PRN
Status: DISCONTINUED | OUTPATIENT
Start: 2019-05-06 | End: 2019-05-19 | Stop reason: HOSPADM

## 2019-05-06 RX ORDER — ONDANSETRON 2 MG/ML
4 INJECTION INTRAMUSCULAR; INTRAVENOUS EVERY 6 HOURS PRN
Status: DISCONTINUED | OUTPATIENT
Start: 2019-05-06 | End: 2019-05-19 | Stop reason: HOSPADM

## 2019-05-06 RX ORDER — SODIUM CHLORIDE 9 MG/ML
125 INJECTION, SOLUTION INTRAVENOUS CONTINUOUS
Status: ACTIVE | OUTPATIENT
Start: 2019-05-06 | End: 2019-05-06

## 2019-05-06 RX ORDER — HYDROCODONE BITARTRATE AND ACETAMINOPHEN 5; 325 MG/1; MG/1
1 TABLET ORAL EVERY 4 HOURS PRN
Status: DISPENSED | OUTPATIENT
Start: 2019-05-06 | End: 2019-05-16

## 2019-05-06 RX ORDER — DEXTROSE MONOHYDRATE 25 G/50ML
25 INJECTION, SOLUTION INTRAVENOUS
Status: DISCONTINUED | OUTPATIENT
Start: 2019-05-06 | End: 2019-05-19 | Stop reason: HOSPADM

## 2019-05-06 RX ORDER — SODIUM CHLORIDE 9 MG/ML
125 INJECTION, SOLUTION INTRAVENOUS CONTINUOUS
Status: DISCONTINUED | OUTPATIENT
Start: 2019-05-06 | End: 2019-05-06

## 2019-05-06 RX ORDER — ACETAMINOPHEN 325 MG/1
650 TABLET ORAL EVERY 4 HOURS PRN
Status: DISCONTINUED | OUTPATIENT
Start: 2019-05-06 | End: 2019-05-19 | Stop reason: HOSPADM

## 2019-05-06 RX ORDER — NICOTINE POLACRILEX 4 MG
15 LOZENGE BUCCAL
Status: DISCONTINUED | OUTPATIENT
Start: 2019-05-06 | End: 2019-05-19 | Stop reason: HOSPADM

## 2019-05-06 RX ORDER — AMLODIPINE BESYLATE 5 MG/1
5 TABLET ORAL DAILY
Status: DISCONTINUED | OUTPATIENT
Start: 2019-05-06 | End: 2019-05-10

## 2019-05-06 RX ORDER — CLOPIDOGREL BISULFATE 75 MG/1
75 TABLET ORAL DAILY
Status: DISCONTINUED | OUTPATIENT
Start: 2019-05-06 | End: 2019-05-07

## 2019-05-06 RX ORDER — SODIUM CHLORIDE 0.9 % (FLUSH) 0.9 %
1-10 SYRINGE (ML) INJECTION AS NEEDED
Status: DISCONTINUED | OUTPATIENT
Start: 2019-05-06 | End: 2019-05-19 | Stop reason: HOSPADM

## 2019-05-06 RX ORDER — ATENOLOL 50 MG/1
50 TABLET ORAL DAILY
Status: DISCONTINUED | OUTPATIENT
Start: 2019-05-06 | End: 2019-05-10

## 2019-05-06 RX ADMIN — SODIUM CHLORIDE, PRESERVATIVE FREE 3 ML: 5 INJECTION INTRAVENOUS at 09:41

## 2019-05-06 RX ADMIN — MICONAZOLE NITRATE: 2 OINTMENT TOPICAL at 20:37

## 2019-05-06 RX ADMIN — ATENOLOL 50 MG: 50 TABLET ORAL at 15:47

## 2019-05-06 RX ADMIN — ATORVASTATIN CALCIUM 40 MG: 20 TABLET, FILM COATED ORAL at 21:56

## 2019-05-06 RX ADMIN — AMLODIPINE BESYLATE 5 MG: 5 TABLET ORAL at 15:47

## 2019-05-06 RX ADMIN — SODIUM CHLORIDE 75 ML/HR: 9 INJECTION, SOLUTION INTRAVENOUS at 04:18

## 2019-05-06 RX ADMIN — INSULIN LISPRO 2 UNITS: 100 INJECTION, SOLUTION INTRAVENOUS; SUBCUTANEOUS at 17:48

## 2019-05-06 RX ADMIN — INSULIN LISPRO 3 UNITS: 100 INJECTION, SOLUTION INTRAVENOUS; SUBCUTANEOUS at 12:12

## 2019-05-06 RX ADMIN — SODIUM CHLORIDE 1000 ML: 9 INJECTION, SOLUTION INTRAVENOUS at 00:01

## 2019-05-06 RX ADMIN — CLOPIDOGREL 75 MG: 75 TABLET, FILM COATED ORAL at 15:47

## 2019-05-06 RX ADMIN — INSULIN LISPRO 2 UNITS: 100 INJECTION, SOLUTION INTRAVENOUS; SUBCUTANEOUS at 20:37

## 2019-05-06 RX ADMIN — MICONAZOLE NITRATE: 2 OINTMENT TOPICAL at 12:00

## 2019-05-06 NOTE — THERAPY EVALUATION
Acute Care - Physical Therapy Initial Evaluation  [facility]     Patient Name: Rock Maciel Jr.  : 1945  MRN: [id number]  Today's Date: 2019   Onset of Illness/Injury or Date of Surgery: 19  Date of Referral to PT: 19  Referring Physician: Azra Fox APRN      Admit Date: 2019    Visit Dx:     ICD-10-CM ICD-9-CM   1. Fall, initial encounter W19.XXXA E888.9   2. Generalized weakness R53.1 780.79   3. Left leg pain M79.605 729.5   4. Immobility Z74.09 799.89     Patient Active Problem List   Diagnosis   • Abnormal ECG   • Arteriosclerosis of coronary artery   • Cardiac murmur   • Essential hypertension   • LAFB (left anterior fascicular block)   • Bundle branch block, right   • Neuropathic arthropathy   • Type 2 diabetes mellitus with circulatory disorder, without long-term current use of insulin (CMS/[facility])   • SHASTA (obstructive sleep apnea)   • Gain of weight   • Gout   • Class 1 obesity due to excess calories without serious comorbidity with body mass index (BMI) of 30.0 to 30.9 in adult   • 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 (CMS/[facility])   • Chronic venous insufficiency   • Nonrheumatic aortic valve stenosis   • Carotid stenosis, asymptomatic   • Bradycardia   • Hyperlipidemia   • Bilateral carotid artery disease (CMS/[facility])   • Abnormal cardiovascular stress test   • Renal insufficiency   • H/O aortic valve replacement with porcine valve   • Charcot-Davida-Tooth disease-like deformity of foot   • Amputated toe of right foot (CMS/[facility])   • Fall   • Non-traumatic rhabdomyolysis   • S/P CABG x 2   • CKD (chronic kidney disease) stage 3, GFR 30-59 ml/min (CMS/[facility])     Past Medical History:   Diagnosis Date   • Allergic rhinitis    • Bronchitis    • Coronary artery disease    • Diabetes mellitus (CMS/[facility])    • DM (diabetes mellitus) (CMS/[facility])    • Encounter for annual health examination 2014    Annual Health Assessment   • Gout    • Hiatal hernia    • History of  MRSA infection     RIGHT FOOT 2010   • Hyperlipidemia    • Hypertension    • Murmur    • Pneumothorax on right    • Sleep apnea     pt wears CPAP at night   • Wellness examination 06/24/2015    Annual Wellness Visit     Past Surgical History:   Procedure Laterality Date   • ARTERY SURGERY Bilateral     carotid   • CARDIAC CATHETERIZATION N/A 7/20/2018    Procedure: Left Heart Cath;  Surgeon: Jo Toney MD;  Location: [facility] INVASIVE LOCATION;  Service: Cardiovascular   • CARDIAC CATHETERIZATION N/A 7/20/2018    Procedure: Coronary angiography;  Surgeon: Jo Toney MD;  Location: [facility] CATH INVASIVE LOCATION;  Service: Cardiovascular   • CAROTID ENDARTERECTOMY Bilateral    • COLONOSCOPY  2008   • CORONARY ARTERY BYPASS GRAFT WITH AORTIC VALVE REPAIR/REPLACEMENT N/A 7/23/2018    Procedure: INTRAOPERATIVE ALEX, MIDLINE STERNOTOMY, CORONARY ARTERY BYPASS GRAFTING X 3 USING ENDOSCOPICALLY HARVESTED LEFT GREATER SAPHENOUS VEIN,  AORTIC VALVE REPLACEMENT USING 25MM LOPEZ II ULTRA PORCINE VALVE, PRP;  Surgeon: Phong Posey MD;  Location: [facility];  Service: Cardiothoracic   • FOOT SURGERY Right 2010    5th digit removal   • FOOT SURGERY Left 2011    1 digit removed   • THORACENTESIS Right 11/21/2016   • THORACOSCOPY Right 5/8/2017    Procedure: BRONCHOSCOPY, RIGHT VAT,  TOTAL DECORTICATION RIGHT LUNG, PLEURAL BX, PLACEMENT SUBPLEURAL PAIN CAATHETERS X2;  Surgeon: Donald Orlando III, MD;  Location: [facility];  Service:    • TONSILECTOMY, ADENOIDECTOMY, BILATERAL MYRINGOTOMY AND TUBES          PT ASSESSMENT (last 12 hours)      Physical Therapy Evaluation     Row Name 05/06/19 1045          PT Evaluation Time/Intention    Subjective Information  complains of;pain  -CA     Document Type  evaluation  -CA     Mode of Treatment  individual therapy;physical therapy  -CA     Patient Effort  good  -CA     Symptoms Noted During/After Treatment  none  -CA     Row Name 05/06/19 3484           General Information    Patient Profile Reviewed?  yes  -CA     Onset of Illness/Injury or Date of Surgery  05/05/19  -CA     Referring Physician  Azra Fox APRN  -CA     Patient Observations  alert;agree to therapy;cooperative  -CA     Patient/Family Observations  supine in bed, no acute distress noted at rest  -CA     Prior Level of Function  independent:  -CA     Equipment Currently Used at Home  shower chair;grab bar pt has bariatric wx and cane but does not use  -CA     Pertinent History of Current Functional Problem  Pt fell at home tripping over luggage, was on ground for 10 hours until decided to call EMS. Cont to have L thigh pain. X-rays unremarkable  -CA     Existing Precautions/Restrictions  fall  -CA     Row Name 05/06/19 1046          Relationship/Environment    Lives With  alone  (Significant)   -CA     Row Name 05/06/19 1046          Resource/Environmental Concerns    Current Living Arrangements  home/apartment/condo  -CA     Row Name 05/06/19 1046          Home Main Entrance    Number of Stairs, Main Entrance  one  -CA     Row Name 05/06/19 1046          Stairs Within Home, Primary    Stairs Comment, Within Home, Primary  one flight to bedroom, one flight to basement  -CA     Row Name 05/06/19 1046          Cognitive Assessment/Intervention- PT/OT    Orientation Status (Cognition)  oriented x 3  -CA     Follows Commands (Cognition)  WNL  -CA     Personal Safety Interventions  fall prevention program maintained;gait belt;nonskid shoes/slippers when out of bed  -CA     Row Name 05/06/19 1046          Bed Mobility Assessment/Treatment    Bed Mobility Assessment/Treatment  supine-sit;sit-supine;scooting/bridging  -CA     Scooting/Bridging [place] (Bed Mobility)  dependent (less than 25% patient effort);2 person assist  -CA     Supine-Sit [place] (Bed Mobility)  moderate assist (50% patient effort)  -CA     Sit-Supine [place] (Bed Mobility)  maximum assist (25% patient effort);2  person assist  -[facility] Name 05/06/19 1046          Transfer Assessment/Treatment    Comment (Transfers)  Attempted STS transfers x5 from elevated bed with bariatric walker and assist of 2, but pt unable to clear buttocks from bed  -[facility] Name 05/06/19 1046          MMT (Manual Muscle Testing)    General MMT Comments  R LE grossly 4/5; L knee ext <3/5 unable to lift against gravity   -[facility] Name 05/06/19 1046          Motor Assessment/Intervention    Additional Documentation  Therapeutic Exercise Interventions (Group)  -[facility] Name 05/06/19 1046          Therapeutic Exercise    Comment (Therapeutic Exercise)  x15 reps seated marches, R LAQs (unable to on L without assist)  -[facility] Name 05/06/19 1046          Sensory Assessment/Intervention    Sensory General Assessment  no sensation deficits identified  -CA     Row Name 05/06/19 1046          Physical Therapy Clinical Impression    Date of Referral to PT  05/06/19  -CA     Criteria for Skilled Interventions Met (PT Clinical Impression)  yes;treatment indicated  -CA     Rehab Potential (PT Clinical Summary)  good, to achieve stated therapy goals  -CA     Row Name 05/06/19 1046          Physical Therapy Goals    Bed Mobility Goal Selection (PT)  bed mobility, PT goal 1  -CA     Transfer Goal Selection (PT)  transfer, PT goal 1  -CA     Gait Training Goal Selection (PT)  gait training, PT goal 1  -CA     Row Name 05/06/19 1046          Bed Mobility Goal 1 (PT)    Activity/Assistive Device (Bed Mobility Goal 1, PT)  bed mobility activities, all  -CA     [place] Level/Cues Needed (Bed Mobility Goal 1, PT)  minimum assist (75% or more patient effort)  -CA     Time Frame (Bed Mobility Goal 1, PT)  1 week  -CA     Row Name 05/06/19 1046          Transfer Goal 1 (PT)    Activity/Assistive Device (Transfer Goal 1, PT)  transfers, all  -CA     [place] Level/Cues Needed (Transfer Goal 1, PT)  minimum assist (75% or more patient effort)  -CA      Time Frame (Transfer Goal 1, PT)  1 week  -CA     Row Name 05/06/19 1046          Gait Training Goal 1 (PT)    Activity/Assistive Device (Gait Training Goal 1, PT)  gait (walking locomotion);assistive device use  -CA     [place] Level (Gait Training Goal 1, PT)  minimum assist (75% or more patient effort)  -CA     Distance (Gait Goal 1, PT)  10  -CA     Time Frame (Gait Training Goal 1, PT)  1 week  -CA     Row Name 05/06/19 1046          Positioning and Restraints    Pre-Treatment Position  in bed  -CA     Post Treatment Position  bed  -CA     In Bed  supine;call light within reach;encouraged to call for assist;exit alarm on;side rails up x2;SCD pump applied  -CA     Row Name 05/06/19 1046          Living Environment    Home Accessibility  stairs within home;stairs to enter home  -CA       User Key  (r) = Recorded By, (t) = Taken By, (c) = Cosigned By    Initials Name Provider Type    Ayanna Leon PT Physical Therapist        Physical Therapy Education     Title: PT OT SLP Therapies (In Progress)     Topic: Physical Therapy (In Progress)     Point: Mobility training (In Progress)     Learning Progress Summary           Patient Acceptance, E, NR by CA at 5/6/2019 10:55 AM                   Point: Home exercise program (In Progress)     Learning Progress Summary           Patient Acceptance, E, NR by CA at 5/6/2019 10:55 AM                   Point: Body mechanics (In Progress)     Learning Progress Summary           Patient Acceptance, E, NR by CA at 5/6/2019 10:55 AM                   Point: Precautions (In Progress)     Learning Progress Summary           Patient Acceptance, E, NR by CA at 5/6/2019 10:55 AM                               User Key     Initials Effective Dates Name Provider Type [facility] 06/13/18 -  Ayanna Mar PT Physical Therapist PT              PT Recommendation and Plan  Anticipated Discharge Disposition (PT): skilled nursing facility  Therapy Frequency (PT Clinical  Impression): daily  Outcome Summary/Treatment Plan (PT)  Anticipated Discharge Disposition (PT): skilled nursing facility  Plan of Care Reviewed With: patient  Outcome Summary: Pt is a 73 y.o. male admitted to [facility] with c/o L thigh pain secondary to fall at home, tripped over luggage in his basement and was on ground for 10 hours before EMS came on 5/5/2019. X-ray's negative. Pt presents today with profound weakness (L>R d/t pain) and decreased functional mobility. Pt required mod A to Max a x2 for bed mobility, dependent x 2 for STS transfers x 5 trials from elevated bed with FWW, but unable to clear buttocks from bed despite effort. Pt will benefit from skilled PT to address the previous deficits and improve overall safety with functional mobility. PTA pt lives alone in a 2 story home with a basement and one DO. Pt reports he has FWW and SPC for home, but ambulates independently with no AD at baseline. Pt is currently functioning far below baseline, and would recommend SNF at [place] as he lives alone and has stairs to reach his bedroom. Will continue to follow.  Outcome Measures     Row Name 05/06/19 1000             How much help from another person do you currently need...    Turning from your back to your side while in flat bed without using bedrails?  2  -CA      Moving from lying on back to sitting on the side of a flat bed without bedrails?  2  -CA      Moving to and from a bed to a chair (including a wheelchair)?  1  -CA      Standing up from a chair using your arms (e.g., wheelchair, bedside chair)?  1  -CA      Climbing 3-5 steps with a railing?  1  -CA      To walk in hospital room?  1  -CA      AM-PAC 6 Clicks Score  8  -CA         Functional Assessment    Outcome Measure Options  AM-PAC 6 Clicks Basic Mobility (PT)  -CA        User Key  (r) = Recorded By, (t) = Taken By, (c) = Cosigned By    Initials Name Provider Type    Ayanna Leon PT Physical Therapist         Time Calculation:   PT Charges      Row Name 05/06/19 1059             Time Calculation    Start Time  1004  -CA      Stop Time  1027  -CA      Time Calculation (min)  23 min  -CA      PT Received On  05/06/19  -CA      PT - Next Appointment  05/07/19  -CA      PT Goal Re-Cert Due Date  05/13/19  -CA         Time Calculation- PT    Total Timed Code Minutes- PT  10 minute(s)  -CA        User Key  (r) = Recorded By, (t) = Taken By, (c) = Cosigned By    Initials Name Provider Type    CA Ayanna Mar, PT Physical Therapist        Therapy Charges for Today     Code Description Service Date Service Provider Modifiers Qty    09161000942 HC PT EVAL MOD COMPLEXITY 2 5/6/2019 Ayanna Mar, PT GP 1    64643084209 HC PT THERAPEUTIC ACT EA 15 MIN 5/6/2019 Ayanna Mar, PT GP 1    35697426403 HC PT THER SUPP EA 15 MIN 5/6/2019 Ayanna Mar, PT GP 2          PT G-Codes  Outcome Measure Options: AM-PAC 6 Clicks Basic Mobility (PT)  AM-PAC 6 Clicks Score: 8      Ayanna Mar PT  5/6/2019

## 2019-05-06 NOTE — PLAN OF CARE
Problem: Patient Care Overview  Goal: Plan of Care Review   05/06/19 1420   Coping/Psychosocial   Plan of Care Reviewed With patient   OTHER   Outcome Summary Pt is a 73 y.o. male admitted to [facility] with c/o L thigh pain secondary to fall at home, tripped over luggage in his basement and was on ground for 10 hours before EMS came on 5/5/2019. X-ray's negative. Pt presents today with profound weakness (L>R d/t pain) and decreased functional mobility. Pt required mod A to Max a x2 for bed mobility, dependent x 2 for STS transfers x 5 trials from elevated bed with FWW, but unable to clear buttocks from bed despite effort. Pt will benefit from skilled PT to address the previous deficits and improve overall safety with functional mobility. PTA pt lives alone in a 2 story home with a basement and one DO. Pt reports he has FWW and SPC for home, but ambulates independently with no AD at baseline. Pt is currently functioning far below baseline, and would recommend SNF at [place] as he lives alone and has stairs to reach his bedroom. Will continue to follow.

## 2019-05-06 NOTE — ED NOTES
"\"  Nursing report ED to floor  Rock Maciel Jr.  73 y.o.  male    HPI (triage note):   Chief Complaint   Patient presents with   • Fall   • Leg Injury       Admitting doctor:   Donald Alexandre MD    Admitting diagnosis:   The primary encounter diagnosis was Fall, initial encounter. Diagnoses of Generalized weakness, Left leg pain, and Immobility were also pertinent to this visit.    Code status:   Current Code Status     Date Active Code Status Order ID Comments User Context       Prior          Allergies:   Ceclor [cefaclor]    Weight:       05/06/19  0000   Weight: (!) 141 kg (311 lb)       Most recent vitals:   Vitals:    05/06/19 0038 05/06/19 0038 05/06/19 0040 05/06/19 0158   BP:  154/76 154/76 144/72   BP Location:  Left arm     Patient Position:  Sitting     Pulse: 82  82 80   Resp:       Temp:       TempSrc:       SpO2: 99%  99% 100%   Weight:       Height:           Active LDAs/IV Access:   Lines, Drains & Airways    Active LDAs     Name:   Placement date:   Placement time:   Site:   Days:    Peripheral IV 05/06/19 0005 Right Antecubital   05/06/19    0005    Antecubital   less than 1                Labs (abnormal labs have a star):   Labs Reviewed   COMPREHENSIVE METABOLIC PANEL - Abnormal; Notable for the following components:       Result Value    Glucose 156 (*)     BUN 49 (*)     Creatinine 1.53 (*)     eGFR Non African Amer 45 (*)     BUN/Creatinine Ratio 32.0 (*)     All other components within normal limits    Narrative:     GFR Normal >60  Chronic Kidney Disease <60  Kidney Failure <15   CK - Abnormal; Notable for the following components:    Creatine Kinase 510 (*)     All other components within normal limits   CBC WITH AUTO DIFFERENTIAL - Abnormal; Notable for the following components:    WBC 13.61 (*)     Hemoglobin 12.4 (*)     MCHC 31.2 (*)     Neutrophil % 90.1 (*)     Lymphocyte % 4.9 (*)     Monocyte % 4.4 (*)     Eosinophil % 0.0 (*)     Neutrophils, Absolute 12.26 (*)     " Lymphocytes, Absolute 0.67 (*)     All other components within normal limits   CBC AND DIFFERENTIAL    Narrative:     The following orders were created for panel order CBC & Differential.  Procedure                               Abnormality         Status                     ---------                               -----------         ------                     CBC Auto Differential[[id number]]        Abnormal            Final result                 Please view results for these tests on the individual orders.       EKG:   No orders to display       Meds given in ED:   Medications   sodium chloride 0.9 % flush 10 mL (not administered)   sodium chloride 0.9 % bolus 1,000 mL (1,000 mL Intravenous New Bag 5/6/19 0001)       Imaging results:  Xr Femur 2 View Left    Result Date: 5/6/2019  No definite acute fracture or subluxation identified, although I think the patient may have a small suprapatellar effusion, as well as some overlying soft tissue swelling within the suprapatellar region.  This report was finalized on 5/6/2019 12:39 AM by Dr. Inocencia Cruz M.D.        Ambulatory status:   - x2 assist    Social issues:   Social History     Socioeconomic History   • Marital status:      Spouse name: Not on file   • Number of children: 1   • Years of education: Not on file   • Highest education level: Not on file   Occupational History   • Occupation: retired from [employer]   Tobacco Use   • Smoking status: Never Smoker   • Smokeless tobacco: Never Used   Substance and Sexual Activity   • Alcohol use: Yes     Comment: either one glass wine or one glass liquor per  day   • Drug use: No   • Sexual activity: Delmy Pedersen RN  05/06/19 9634

## 2019-05-06 NOTE — ED TRIAGE NOTES
Pt brought by EMS for fall approx 10 hours PTA, pt was unable to stand after fall and had to crawl to phone. Pt denies pain currently, has L leg weakness since- EMS found him lying on his L hip. Pt is A&Ox3.

## 2019-05-06 NOTE — H&P
Patient Name:  Rock Maciel Jr.  YOB: 1945  MRN:  [id number]  Admit Date:  5/5/2019  Patient Care Team:  Karen Red MD as PCP - General (Family Medicine)  Azam Banegas Jr., MD as Consulting Physician (Cardiology)      Subjective   History Present Illness     Chief Complaint   Patient presents with   • Fall   • Leg Injury       Mr. Maciel is a 73 y.o. non-smoker with a history of HTN, Type 2 DM, AV stenosis s/p porcine valve replacement, CAD s/p CABG x2 2018 with preserved LVEF, and CKD stage 3 that presents to [facility] complaining of left leg pain following a mechanical fall caused by tripping over luggage.  This occurred in his basement yesterday.  He landed on this left knee on a concrete floor.  He did not lose consciousness or hit his head.  He noted that he could not get up following this but did manage to crawl to a phone to call 911.  He was down for about 9-10 hours.  He is still unable to bear weight this morning. He has no significant pain at rest.  There is no back pain or LE numbness.      History of Present Illness  Review of Systems   Constitutional: Negative for chills and fever.   HENT: Negative for congestion, nosebleeds and trouble swallowing.    Eyes: Negative for redness and visual disturbance.   Respiratory: Negative for cough, choking and shortness of breath.    Cardiovascular: Negative for chest pain and palpitations.   Gastrointestinal: Negative for abdominal pain, constipation, diarrhea, nausea and vomiting.   Endocrine: Negative for cold intolerance and heat intolerance.   Genitourinary: Negative for difficulty urinating, dysuria and hematuria.   Musculoskeletal: Positive for arthralgias and myalgias. Negative for back pain and neck pain.   Skin: Negative for pallor and rash.   Neurological: Negative for dizziness, syncope, light-headedness and headaches.   Hematological: Negative for adenopathy. Does not bruise/bleed easily.    Psychiatric/Behavioral: Negative for confusion and decreased concentration.        Personal History     Past Medical History:   Diagnosis Date   • Allergic rhinitis    • Bronchitis    • Coronary artery disease    • Diabetes mellitus (CMS/[facility]) 2001   • DM (diabetes mellitus) (CMS/[facility])    • Encounter for annual health examination 05/06/2014    Annual Health Assessment   • Gout    • Hiatal hernia    • History of MRSA infection     RIGHT FOOT 2010   • Hyperlipidemia    • Hypertension    • Murmur    • Pneumothorax on right    • Sleep apnea     pt wears CPAP at night   • Wellness examination 06/24/2015    Annual Wellness Visit     Past Surgical History:   Procedure Laterality Date   • ARTERY SURGERY Bilateral     carotid   • CARDIAC CATHETERIZATION N/A 7/20/2018    Procedure: Left Heart Cath;  Surgeon: Jo Toney MD;  Location: [facility] CATH INVASIVE LOCATION;  Service: Cardiovascular   • CARDIAC CATHETERIZATION N/A 7/20/2018    Procedure: Coronary angiography;  Surgeon: Jo Toney MD;  Location: [facility] CATH INVASIVE LOCATION;  Service: Cardiovascular   • CAROTID ENDARTERECTOMY Bilateral    • COLONOSCOPY  2008   • CORONARY ARTERY BYPASS GRAFT WITH AORTIC VALVE REPAIR/REPLACEMENT N/A 7/23/2018    Procedure: INTRAOPERATIVE ALEX, MIDLINE STERNOTOMY, CORONARY ARTERY BYPASS GRAFTING X 3 USING ENDOSCOPICALLY HARVESTED LEFT GREATER SAPHENOUS VEIN,  AORTIC VALVE REPLACEMENT USING 25MM LOPEZ II ULTRA PORCINE VALVE, PRP;  Surgeon: Phong Posey MD;  Location: [facility] OR;  Service: Cardiothoracic   • FOOT SURGERY Right 2010    5th digit removal   • FOOT SURGERY Left 2011    1 digit removed   • THORACENTESIS Right 11/21/2016   • THORACOSCOPY Right 5/8/2017    Procedure: BRONCHOSCOPY, RIGHT VAT,  TOTAL DECORTICATION RIGHT LUNG, PLEURAL BX, PLACEMENT SUBPLEURAL PAIN CAATHETERS X2;  Surgeon: Donald Orlando III, MD;  Location: [facility] OR;  Service:    • TONSILECTOMY, ADENOIDECTOMY, BILATERAL  MYRINGOTOMY AND TUBES       Family History   Problem Relation Age of Onset   • Hypertension Father      Social History     Tobacco Use   • Smoking status: Never Smoker   • Smokeless tobacco: Never Used   Substance Use Topics   • Alcohol use: Yes     Comment: either one glass wine or one glass liquor per  day   • Drug use: No     Medications Prior to Admission   Medication Sig Dispense Refill Last Dose   • amLODIPine (NORVASC) 5 MG tablet Take 1 tablet by mouth Daily. 90 tablet 1    • atenolol (TENORMIN) 50 MG tablet Take 1 tablet by mouth Daily. (Patient taking differently: Take 50 mg by mouth 2 (Two) Times a Day.) 180 tablet 1    • atorvastatin (LIPITOR) 40 MG tablet Take 1 tablet by mouth Every Night. 90 tablet 1    • Cholecalciferol (VITAMIN D3 PO) Take 2,000 Units by mouth Daily. PT HOLDING FOR SURGERY    Patient Taking Differently   • clopidogrel (PLAVIX) 75 MG tablet Take 1 tablet by mouth Daily. 90 tablet 1    • furosemide (LASIX) 40 MG tablet Take 1 tablet by mouth Daily. 90 tablet 1    • glipiZIDE (GLUCOTROL) 5 MG tablet 1 in am and 1 at 5 pm 180 tablet 1    • glucose blood (ACCU-CHEK BOB PLUS) test strip Use as instructed 200 each 12    • Lancets (ACCU-CHEK SOFT TOUCH) lancets ACCU-CHEK SOFTCLIX LANCETS 200 each 1    • metFORMIN ER (GLUCOPHAGE-XR) 750 MG 24 hr tablet Take 1 tablet by mouth 3 (Three) Times a Day With Meals. (Patient taking differently: Take 750 mg by mouth 2 (Two) Times a Day. Patient unsure if he takes it bid or tid but thinks it is bid) 270 tablet 1    • vitamin C (ASCORBIC ACID) 250 MG tablet Take 250 mg by mouth Daily.   Taking   • Omega-3 Fatty Acids (FISH OIL) 1000 MG capsule capsule Take 1,000 mg by mouth Daily With Breakfast. PT HOLDING FOR SURGERY   Taking     Allergies:    Allergies   Allergen Reactions   • Ceclor [Cefaclor] Rash       Objective    Objective     Vital Signs  Temp:  [97 °F (36.1 °C)-99 °F (37.2 °C)] 97 °F (36.1 °C)  Heart Rate:  [80-86] 86  Resp:  [16] 16  BP:  (144-166)/(62-93) 166/62  SpO2:  [97 %-100 %] 97 %  on   ;   Device (Oxygen Therapy): room air  Body mass index is 41.74 kg/m².    Physical Exam   Constitutional: He is oriented to person, place, and time. No distress.   HENT:   Head: Normocephalic and atraumatic.   Mouth/Throat: Oropharynx is clear and moist.   Eyes: Conjunctivae and EOM are normal. Pupils are equal, round, and reactive to light.   Neck: Normal range of motion. Neck supple.   Cardiovascular: Normal rate, regular rhythm and intact distal pulses.   Pulmonary/Chest: Effort normal and breath sounds normal. He has no rales.   Abdominal: Soft. Bowel sounds are normal.   Musculoskeletal: He exhibits tenderness (left knee, left anterior thigh) and deformity (previous toe amputations of bilateral feet).   Quadriceps spasm with attempt at left knee extension  Left knee bruised and swollen with joint effusion  Very faint bruising on the left anterior thigh   Neurological: He is alert and oriented to person, place, and time. No cranial nerve deficit or sensory deficit.   Extremely weak in left knee extension mostly due to pain   Skin: Skin is warm and dry. He is not diaphoretic.   Psychiatric: He has a normal mood and affect. His behavior is normal.   Nursing note and vitals reviewed.      Results Review:  I reviewed the patient's new clinical results.  I reviewed the patient's new imaging results and agree with the interpretation.  I reviewed the patient's other test results and agree with the interpretation  I personally viewed and interpreted the patient's EKG/Telemetry data    Lab Results (last 24 hours)     Procedure Component Value Units Date/Time    CBC & Differential [[id number]] Collected:  05/06/19 0000    Specimen:  Blood Updated:  05/06/19 0013    Narrative:       The following orders were created for panel order CBC & Differential.  Procedure                               Abnormality         Status                     ---------                                -----------         ------                     CBC Auto Differential[[id number]]        Abnormal            Final result                 Please view results for these tests on the individual orders.    Comprehensive Metabolic Panel [[id number]]  (Abnormal) Collected:  05/06/19 0000    Specimen:  Blood Updated:  05/06/19 0034     Glucose 156 mg/dL      BUN 49 mg/dL      Creatinine 1.53 mg/dL      Sodium 142 mmol/L      Potassium 4.3 mmol/L      Chloride 105 mmol/L      CO2 22.1 mmol/L      Calcium 9.2 mg/dL      Total Protein 7.8 g/dL      Albumin 4.10 g/dL      ALT (SGPT) 14 U/L      AST (SGOT) 23 U/L      Alkaline Phosphatase 117 U/L      Total Bilirubin 0.6 mg/dL      eGFR Non African Amer 45 mL/min/1.73      Globulin 3.7 gm/dL      A/G Ratio 1.1 g/dL      BUN/Creatinine Ratio 32.0     Anion Gap 14.9 mmol/L     Narrative:       GFR Normal >60  Chronic Kidney Disease <60  Kidney Failure <15    CK [[id number]]  (Abnormal) Collected:  05/06/19 0000    Specimen:  Blood Updated:  05/06/19 0034     Creatine Kinase 510 U/L     CBC Auto Differential [[id number]]  (Abnormal) Collected:  05/06/19 0000    Specimen:  Blood Updated:  05/06/19 0013     WBC 13.61 10*3/mm3      RBC 4.45 10*6/mm3      Hemoglobin 12.4 g/dL      Hematocrit 39.8 %      MCV 89.4 fL      MCH 27.9 pg      MCHC 31.2 g/dL      RDW 14.3 %      RDW-SD 45.9 fl      MPV 10.3 fL      Platelets 221 10*3/mm3      Neutrophil % 90.1 %      Lymphocyte % 4.9 %      Monocyte % 4.4 %      Eosinophil % 0.0 %      Basophil % 0.2 %      Immature Grans % 0.4 %      Neutrophils, Absolute 12.26 10*3/mm3      Lymphocytes, Absolute 0.67 10*3/mm3      Monocytes, Absolute 0.60 10*3/mm3      Eosinophils, Absolute 0.00 10*3/mm3      Basophils, Absolute 0.03 10*3/mm3      Immature Grans, Absolute 0.05 10*3/mm3      nRBC 0.0 /100 WBC     Basic Metabolic Panel [[id number]]  (Abnormal) Collected:  05/06/19 0721    Specimen:  Blood Updated:  05/06/19 0836     Glucose 134 mg/dL       BUN 45 mg/dL      Creatinine 1.38 mg/dL      Sodium 142 mmol/L      Potassium 4.0 mmol/L      Chloride 106 mmol/L      CO2 24.5 mmol/L      Calcium 8.8 mg/dL      eGFR Non African Amer 50 mL/min/1.73      BUN/Creatinine Ratio 32.6     Anion Gap 11.5 mmol/L     Narrative:       GFR Normal >60  Chronic Kidney Disease <60  Kidney Failure <15    CBC (No Diff) [[id number]]  (Abnormal) Collected:  05/06/19 0721    Specimen:  Blood Updated:  05/06/19 0827     WBC 8.17 10*3/mm3      RBC 3.90 10*6/mm3      Hemoglobin 11.0 g/dL      Hematocrit 34.7 %      MCV 89.0 fL      MCH 28.2 pg      MCHC 31.7 g/dL      RDW 14.4 %      RDW-SD 46.2 fl      MPV 10.9 fL      Platelets 197 10*3/mm3     CK [[id number]]  (Abnormal) Collected:  05/06/19 0721    Specimen:  Blood Updated:  05/06/19 0836     Creatine Kinase 517 U/L     Hemoglobin A1c [[id number]]  (Abnormal) Collected:  05/06/19 0721    Specimen:  Blood Updated:  05/06/19 0823     Hemoglobin A1C 6.00 %     Narrative:       Hemoglobin A1C Ranges:    Increased Risk for Diabetes  5.7% to 6.4%  Diabetes                     >= 6.5%  Diabetic Goal                < 7.0%    POC Glucose Once [[id number]]  (Normal) Collected:  05/06/19 0733    Specimen:  Blood Updated:  05/06/19 0735     Glucose 130 mg/dL           Imaging Results (last 24 hours)     Procedure Component Value Units Date/Time    XR Femur 2 View Left [[id number]] Collected:  05/06/19 0036     Updated:  05/06/19 0043    Narrative:       6 VIEWS LEFT FEMUR     HISTORY: Left femur pain after a fall about 6 hours ago.     COMPARISON: None available.     FINDINGS:  The exam is degraded by patient positioning. No obvious acute fracture  or subluxation of the left femur is seen. There is some fullness within  the suprapatellar pouch which may reflect an effusion. No aggressive  osseous abnormalities are seen. Dense atherosclerotic calcification of  the femoral vasculature is noted. There may also be some focal soft  tissue swelling  within the suprapatellar region.       Impression:       No definite acute fracture or subluxation identified, although I think  the patient may have a small suprapatellar effusion, as well as some  overlying soft tissue swelling within the suprapatellar region.     This report was finalized on 5/6/2019 12:39 AM by Dr. Inocencia Cruz M.D.             Results for orders placed during the hospital encounter of 07/19/18   Adult Transthoracic Echo Limited W/ Cont if Necessary Per Protocol    Narrative · Limited echo performed  · Suboptimal scan  · LV function appears to be normal  · No pericardial effusion          No orders to display        Assessment/Plan     Active Hospital Problems    Diagnosis POA   • Fall [W19.XXXA] Yes   • Non-traumatic rhabdomyolysis [M62.82] Yes   • S/P CABG x 2 [Z95.1] Not Applicable     July 2018     • CKD (chronic kidney disease) stage 3, GFR 30-59 ml/min (CMS/[facility]) [N18.3] Yes   • H/O aortic valve replacement with porcine valve [Z95.3] Not Applicable   • Essential hypertension [I10] Yes   • Type 2 diabetes mellitus with circulatory disorder, without long-term current use of insulin (CMS/[facility]) [E11.59] Yes   • SHASTA (obstructive sleep apnea) [G47.33] Yes   Fall with rhabdomyolysis  - mechanical, down about 9-10hours on concrete basement floor  - will give IVF, follow labs including CK in AM  - patient is unable to weight bear on the left lower extremity and is very weak with L knee extension-I suspect a tendon or muscle injury-will start with a CT of the left leg but may end up requiring MRI  - pain control as needed  - PT following    Type 2 DM  - controlled, A1C 6.00%  - on metformin and glipizide at home which will be held  - cover with ssi    CKD stage 3  - baseline Cr appears to be 1.3-1.5  - will monitor    SHASTA  - he is on CPAP at home but has not brought it with him  - encouraged to have someone bring this if possible  - PRN nocturnal O2       I discussed the patients findings and my  recommendations with patient and nursing staff.    VTE Prophylaxis - SCDs .  Code Status - Full code.       Jd Gatica MD  [facility]  05/06/19  10:34 AM

## 2019-05-06 NOTE — ED PROVIDER NOTES
EMERGENCY DEPARTMENT ENCOUNTER    CHIEF COMPLAINT  Chief Complaint: Fall  History given by: patient   History limited by: n/a   Room Number: P387/1  PMD: Karen Red MD      HPI:  Pt is a 73 y.o. male who presents complaining of left thigh pain after a fall that occurred 12 hours ago. Pt states that he tripped over a piece of luggage, and was unable to get up after the fall due to the pain and inability to bear weight. He denies hitting his head, LOC, or any other injuries. Pt is currently on Plavix.     Duration:  12 hours   Onset: gradaul  Timing: constant   Location: left thigh   Radiation: none  Intensity/Severity: moderate  Progression: unchanged   Associated Symptoms: none  Aggravating Factors: movement, weight bearing   Alleviating Factors: none    PAST MEDICAL HISTORY  Active Ambulatory Problems     Diagnosis Date Noted   • Abnormal ECG 05/17/2016   • Arteriosclerosis of coronary artery 05/17/2016   • Cardiac murmur 05/17/2016   • Essential hypertension 05/17/2016   • LAFB (left anterior fascicular block) 05/17/2016   • Bundle branch block, right 05/17/2016   • Neuropathic arthropathy 05/17/2016   • Diabetes mellitus (CMS/HCC) 05/17/2016   • SHASTA (obstructive sleep apnea) 05/17/2016   • Gain of weight 05/17/2016   • Gout 10/27/2012   • Class 1 obesity due to excess calories without serious comorbidity with body mass index (BMI) of 30.0 to 30.9 in adult 10/27/2012   • 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 (CMS/HCC) 10/27/2012   • Chronic venous insufficiency 10/27/2012   • Nonrheumatic aortic valve stenosis 01/30/2017   • Carotid stenosis, asymptomatic 01/30/2017   • Bradycardia 05/08/2018   • Hyperlipidemia 05/08/2018   • Bilateral carotid artery disease (CMS/HCC) 05/08/2018   • Abnormal cardiovascular stress test 06/26/2018   • Renal insufficiency 07/19/2018   • S/P AVR 09/10/2018   • 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) 11/15/2012   • Pleural effusion 05/08/2017     Past Medical History:   Diagnosis Date   • Allergic rhinitis    • Bronchitis    • Coronary artery disease    • Diabetes mellitus (CMS/[facility]) 2001   • DM (diabetes mellitus) (CMS/[facility])    • Encounter for annual health examination 05/06/2014   • Gout    • Hiatal hernia    • History of MRSA infection    • Hyperlipidemia    • Hypertension    • Murmur    • Pneumothorax on right    • Sleep apnea    • Wellness examination 06/24/2015       PAST SURGICAL HISTORY  Past Surgical History:   Procedure Laterality Date   • ARTERY SURGERY Bilateral     carotid   • CARDIAC CATHETERIZATION N/A 7/20/2018    Procedure: Left Heart Cath;  Surgeon: Jo Toney MD;  Location: [facility] CATH INVASIVE LOCATION;  Service: Cardiovascular   • CARDIAC CATHETERIZATION N/A 7/20/2018    Procedure: Coronary angiography;  Surgeon: Jo Toney MD;  Location: [facility] CATH INVASIVE LOCATION;  Service: Cardiovascular   • CAROTID ENDARTERECTOMY Bilateral    • COLONOSCOPY  2008   • CORONARY ARTERY BYPASS GRAFT WITH AORTIC VALVE REPAIR/REPLACEMENT N/A 7/23/2018    Procedure: INTRAOPERATIVE ALEX, MIDLINE STERNOTOMY, CORONARY ARTERY BYPASS GRAFTING X 3 USING ENDOSCOPICALLY HARVESTED LEFT GREATER SAPHENOUS VEIN,  AORTIC VALVE REPLACEMENT USING 25MM LOPEZ II ULTRA PORCINE VALVE, PRP;  Surgeon: Phong Posey MD;  Location: [facility] OR;  Service: Cardiothoracic   • FOOT SURGERY Right 2010    5th digit removal   • FOOT SURGERY Left 2011    1 digit removed   • THORACENTESIS Right 11/21/2016   • THORACOSCOPY Right 5/8/2017    Procedure: BRONCHOSCOPY, RIGHT VAT,  TOTAL DECORTICATION RIGHT LUNG, PLEURAL  BX, PLACEMENT SUBPLEURAL PAIN CAATHETERS X2;  Surgeon: Donald Orlando III, MD;  Location: [facility] MAIN OR;  Service:    • TONSILECTOMY, ADENOIDECTOMY, BILATERAL MYRINGOTOMY AND TUBES         FAMILY HISTORY  Family History   Problem Relation Age of Onset   • Hypertension Father        SOCIAL HISTORY  Social History     Socioeconomic History   • Marital status:      Spouse name: Not on file   • Number of children: 1   • Years of education: Not on file   • Highest education level: Not on file   Occupational History   • Occupation: retired from [employer]   Tobacco Use   • Smoking status: Never Smoker   • Smokeless tobacco: Never Used   Substance and Sexual Activity   • Alcohol use: Yes     Comment: either one glass wine or one glass liquor per  day   • Drug use: No   • Sexual activity: Defer       ALLERGIES  Ceclor [cefaclor]    REVIEW OF SYSTEMS  Review of Systems   Constitutional: Negative for activity change, appetite change and fever.   HENT: Negative for congestion and sore throat.    Eyes: Negative.    Respiratory: Negative for cough and shortness of breath.    Cardiovascular: Negative for chest pain and leg swelling.   Gastrointestinal: Negative for abdominal pain, diarrhea and vomiting.   Endocrine: Negative.    Genitourinary: Negative for decreased urine volume and dysuria.   Musculoskeletal: Positive for myalgias (left leg pain). Negative for neck pain.   Skin: Negative for rash and wound.   Allergic/Immunologic: Negative.    Neurological: Negative for weakness, numbness and headaches.   Hematological: Negative.    Psychiatric/Behavioral: Negative.    All other systems reviewed and are negative.      PHYSICAL EXAM  ED Triage Vitals [05/05/19 2310]   Temp Heart Rate Resp BP SpO2   99 °F (37.2 °C) 86 16 165/93 97 %      Temp src Heart Rate Source Patient Position BP Location FiO2 (%)   Tympanic -- -- -- --       Physical Exam   Constitutional: He is oriented to person, place, and time. No distress.    HENT:   Head: Normocephalic and atraumatic.   Eyes: EOM are normal. Pupils are equal, round, and reactive to light.   Neck: Normal range of motion. Neck supple.   Cardiovascular: Normal rate, regular rhythm and normal heart sounds.   Pulmonary/Chest: Effort normal and breath sounds normal. No respiratory distress.   Abdominal: Soft. There is no tenderness. There is no rebound and no guarding.   Musculoskeletal: Normal range of motion. He exhibits no edema.        Left upper leg: He exhibits tenderness. He exhibits no deformity.   Neurological: He is alert and oriented to person, place, and time. He has normal sensation and normal strength.   Skin: Skin is warm and dry.   Psychiatric: Mood and affect normal.   Nursing note and vitals reviewed.      LAB RESULTS  Lab Results (last 24 hours)     Procedure Component Value Units Date/Time    CBC & Differential [[id number]] Collected:  05/06/19 0000    Specimen:  Blood Updated:  05/06/19 0013    Narrative:       The following orders were created for panel order CBC & Differential.  Procedure                               Abnormality         Status                     ---------                               -----------         ------                     CBC Auto Differential[[id number]]        Abnormal            Final result                 Please view results for these tests on the individual orders.    Comprehensive Metabolic Panel [[id number]]  (Abnormal) Collected:  05/06/19 0000    Specimen:  Blood Updated:  05/06/19 0034     Glucose 156 mg/dL      BUN 49 mg/dL      Creatinine 1.53 mg/dL      Sodium 142 mmol/L      Potassium 4.3 mmol/L      Chloride 105 mmol/L      CO2 22.1 mmol/L      Calcium 9.2 mg/dL      Total Protein 7.8 g/dL      Albumin 4.10 g/dL      ALT (SGPT) 14 U/L      AST (SGOT) 23 U/L      Alkaline Phosphatase 117 U/L      Total Bilirubin 0.6 mg/dL      eGFR Non African Amer 45 mL/min/1.73      Globulin 3.7 gm/dL      A/G Ratio 1.1 g/dL       BUN/Creatinine Ratio 32.0     Anion Gap 14.9 mmol/L     Narrative:       GFR Normal >60  Chronic Kidney Disease <60  Kidney Failure <15    CK [[id number]]  (Abnormal) Collected:  05/06/19 0000    Specimen:  Blood Updated:  05/06/19 0034     Creatine Kinase 510 U/L     CBC Auto Differential [[id number]]  (Abnormal) Collected:  05/06/19 0000    Specimen:  Blood Updated:  05/06/19 0013     WBC 13.61 10*3/mm3      RBC 4.45 10*6/mm3      Hemoglobin 12.4 g/dL      Hematocrit 39.8 %      MCV 89.4 fL      MCH 27.9 pg      MCHC 31.2 g/dL      RDW 14.3 %      RDW-SD 45.9 fl      MPV 10.3 fL      Platelets 221 10*3/mm3      Neutrophil % 90.1 %      Lymphocyte % 4.9 %      Monocyte % 4.4 %      Eosinophil % 0.0 %      Basophil % 0.2 %      Immature Grans % 0.4 %      Neutrophils, Absolute 12.26 10*3/mm3      Lymphocytes, Absolute 0.67 10*3/mm3      Monocytes, Absolute 0.60 10*3/mm3      Eosinophils, Absolute 0.00 10*3/mm3      Basophils, Absolute 0.03 10*3/mm3      Immature Grans, Absolute 0.05 10*3/mm3      nRBC 0.0 /100 WBC           I ordered the above labs and reviewed the results    RADIOLOGY  XR Femur 2 View Left   Final Result   No definite acute fracture or subluxation identified, although I think   the patient may have a small suprapatellar effusion, as well as some   overlying soft tissue swelling within the suprapatellar region.       This report was finalized on 5/6/2019 12:39 AM by Dr. Inocencia Cruz M.D.               I ordered the above noted radiological studies. Interpreted by radiologist.. Reviewed by me in PACS.       PROCEDURES  Procedures      PROGRESS AND CONSULTS       23:36  BP- 165/93 HR- 86 Temp- 99 °F (37.2 °C) (Tympanic) O2 sat- 97%  Informed pt of the plan for labs and XR. Pt understands and agrees with the plan, all questions answered.    23:41  IVF ordered for hydration. CMP, CBC, CK, and XR left femur ordered.     23;36  Call placed to [facility] for admission.    01;02  BP- 154/76 HR- 82 Temp- 99 °F  (37.2 °C) (Tympanic) O2 sat- 99%  Rechecked the patient who is in NAD and is resting comfortably. Informed pt that the labs and XR show NAD. Informed pt of the plan for admission d/t immobility. Pt understands and agrees with the plan, all questions answered.    01:15  Discussed pt's case with ZEINA Lan ([facility]), who agrees to admit the pt.      MEDICAL DECISION MAKING  Results were reviewed/discussed with the patient and they were also made aware of online access. Pt also made aware that some labs, such as cultures, will not be resulted during ER visit and follow up with PMD is necessary.     MDM  Number of Diagnoses or Management Options  Fall, initial encounter:   Generalized weakness:   Immobility:   Left leg pain:      Amount and/or Complexity of Data Reviewed  Clinical lab tests: ordered and reviewed (KM=408)  Tests in the radiology section of CPT®: ordered and reviewed (XR left femur ordered. )  Decide to obtain previous medical records or to obtain history from someone other than the patient: yes  Review and summarize past medical records: yes (Last admitted on 12/20/18 s/t acute osteomyelitis of the foot.)  Discuss the patient with other providers: yes (ZEINA Lan ([facility]))    Patient Progress  Patient progress: stable         DIAGNOSIS  Final diagnoses:   Fall, initial encounter   Generalized weakness   Left leg pain   Immobility       DISPOSITION  ADMISSION    Discussed treatment plan and reason for admission with pt/family and admitting physician.  Pt/family voiced understanding of the plan for admission for further testing/treatment as needed.     Latest Documented Vital Signs:  As of 6:36 AM  BP- 151/69 HR- 84 Temp- 98.1 °F (36.7 °C) (Oral) O2 sat- 97%    --  Documentation assistance provided by susy Cobos for Dr Brenner.  Information recorded by the susy was done at my direction and has been verified and validated by me.     Angela Cobos  05/06/19 0117       Otoniel Brenner  MD Alex  05/06/19 0654

## 2019-05-06 NOTE — ED NOTES
Pt c/o L upper leg pain. Pt denies hitting head during mechanical fall. Pt reports lying on the ground for approx 10 hours. Pt soiled clothes with stool and pt changed and dressed in gown, brief applied, and linens changed. Reassurance given; call light in reach. Pts breathing even and unlabored. Pt appears in NAD at this time.        Colette Dexter, RN  05/06/19 0008

## 2019-05-06 NOTE — PLAN OF CARE
Problem: Patient Care Overview  Goal: Plan of Care Review  Outcome: Ongoing (interventions implemented as appropriate)   05/06/19 0093   Coping/Psychosocial   Plan of Care Reviewed With patient   OTHER   Outcome Summary Left knee has increased in swelling throughout shift. Patient is excoriated in groin, wound came and ordered anti-fungal cream. Patient c/o NO pain while laying in bed, only when ambulating with PT. Increase in pain when weight bearing. CT completed on left leg. Patient VSS. No nausea. Will continue to monitor.      Goal: Individualization and Mutuality  Outcome: Ongoing (interventions implemented as appropriate)    Goal: Discharge Needs Assessment  Outcome: Ongoing (interventions implemented as appropriate)    Goal: Interprofessional Rounds/Family Conf  Outcome: Ongoing (interventions implemented as appropriate)      Problem: Fall Risk (Adult)  Goal: Absence of Fall  Outcome: Ongoing (interventions implemented as appropriate)      Problem: Pain, Acute (Adult)  Goal: Identify Related Risk Factors and Signs and Symptoms  Outcome: Outcome(s) achieved Date Met: 05/06/19    Goal: Acceptable Pain Control/Comfort Level  Outcome: Ongoing (interventions implemented as appropriate)      Problem: Skin Injury Risk (Adult)  Goal: Identify Related Risk Factors and Signs and Symptoms  Outcome: Outcome(s) achieved Date Met: 05/06/19    Goal: Skin Health and Integrity  Outcome: Ongoing (interventions implemented as appropriate)

## 2019-05-06 NOTE — PLAN OF CARE
Problem: Patient Care Overview  Goal: Plan of Care Review  Outcome: Ongoing (interventions implemented as appropriate)   05/06/19 0423   Coping/Psychosocial   Plan of Care Reviewed With patient   Plan of Care Review   Progress no change   OTHER   Outcome Summary ADM FROM ED, VSS, C/O LEFT THIGH AND KNEE PAIN, NEUROVASCULAR STATUS WNL, VOIDS INCONTINENTLY, PERINEUM, SCROTUM, AND BILATERAL GROIN AREAS EXCORIATED(PT STATES FROM VOIDING ON SELF FOR 10 HOURS WHEN HE FELL, CONSULTED WOUND NURSE AND BARRIER CREAM APPLIED, BILATERAL KNEES REDDENED AND SWOLLEN AND HAS ABRASION ON RT KNEE, DECLINES NEED FOR ANALGESIC WHEN OFFERED,PT TO SEE TODAY FOR MOBILITY, WILL CONTINUE TO MONITOR     Goal: Individualization and Mutuality  Outcome: Ongoing (interventions implemented as appropriate)    Goal: Discharge Needs Assessment  Outcome: Ongoing (interventions implemented as appropriate)    Goal: Interprofessional Rounds/Family Conf  Outcome: Ongoing (interventions implemented as appropriate)      Problem: Fall Risk (Adult)  Goal: Identify Related Risk Factors and Signs and Symptoms  Outcome: Outcome(s) achieved Date Met: 05/06/19    Goal: Absence of Fall  Outcome: Ongoing (interventions implemented as appropriate)      Problem: Pain, Acute (Adult)  Goal: Identify Related Risk Factors and Signs and Symptoms  Outcome: Ongoing (interventions implemented as appropriate)    Goal: Acceptable Pain Control/Comfort Level  Outcome: Ongoing (interventions implemented as appropriate)      Problem: Skin Injury Risk (Adult)  Goal: Identify Related Risk Factors and Signs and Symptoms  Outcome: Ongoing (interventions implemented as appropriate)    Goal: Skin Health and Integrity  Outcome: Ongoing (interventions implemented as appropriate)

## 2019-05-06 NOTE — NURSING NOTE
CWOCN consult for skin excoriation. Patient fell at home trying to get to the bathroom. He states that he was having loose bm and incontinence. His scrotum, groins, perineum are red, warm, and tender. He has a bruise on his coccyx from falling. Staff has been applying barrier ointment. Recommend adding an antifungal barrier ointment related to the patchy pink skin noted at the groins and scrotum. Patient agrees with plan. Staff is turning him. He is weak.   RN agrees with plan.

## 2019-05-07 PROBLEM — S76.112A RUPTURE OF LEFT QUADRICEPS TENDON: Status: ACTIVE | Noted: 2019-05-07

## 2019-05-07 LAB
ANION GAP SERPL CALCULATED.3IONS-SCNC: 7.8 MMOL/L
BUN BLD-MCNC: 39 MG/DL (ref 8–23)
BUN/CREAT SERPL: 30.7 (ref 7–25)
CALCIUM SPEC-SCNC: 8.6 MG/DL (ref 8.6–10.5)
CHLORIDE SERPL-SCNC: 105 MMOL/L (ref 98–107)
CK SERPL-CCNC: 480 U/L (ref 20–200)
CO2 SERPL-SCNC: 24.2 MMOL/L (ref 22–29)
CREAT BLD-MCNC: 1.27 MG/DL (ref 0.76–1.27)
DEPRECATED RDW RBC AUTO: 48.6 FL (ref 37–54)
ERYTHROCYTE [DISTWIDTH] IN BLOOD BY AUTOMATED COUNT: 14.7 % (ref 12.3–15.4)
GFR SERPL CREATININE-BSD FRML MDRD: 55 ML/MIN/1.73
GLUCOSE BLD-MCNC: 130 MG/DL (ref 65–99)
GLUCOSE BLDC GLUCOMTR-MCNC: 123 MG/DL (ref 70–130)
GLUCOSE BLDC GLUCOMTR-MCNC: 202 MG/DL (ref 70–130)
GLUCOSE BLDC GLUCOMTR-MCNC: 209 MG/DL (ref 70–130)
GLUCOSE BLDC GLUCOMTR-MCNC: 229 MG/DL (ref 70–130)
HCT VFR BLD AUTO: 33.7 % (ref 37.5–51)
HGB BLD-MCNC: 10.4 G/DL (ref 13–17.7)
MCH RBC QN AUTO: 28 PG (ref 26.6–33)
MCHC RBC AUTO-ENTMCNC: 30.9 G/DL (ref 31.5–35.7)
MCV RBC AUTO: 90.6 FL (ref 79–97)
PLATELET # BLD AUTO: 174 10*3/MM3 (ref 140–450)
PMV BLD AUTO: 10.6 FL (ref 6–12)
POTASSIUM BLD-SCNC: 3.7 MMOL/L (ref 3.5–5.2)
RBC # BLD AUTO: 3.72 10*6/MM3 (ref 4.14–5.8)
SODIUM BLD-SCNC: 137 MMOL/L (ref 136–145)
TROPONIN T SERPL-MCNC: <0.01 NG/ML (ref 0–0.03)
WBC NRBC COR # BLD: 6.15 10*3/MM3 (ref 3.4–10.8)

## 2019-05-07 PROCEDURE — 63710000001 INSULIN LISPRO (HUMAN) PER 5 UNITS: Performed by: NURSE PRACTITIONER

## 2019-05-07 PROCEDURE — 80048 BASIC METABOLIC PNL TOTAL CA: CPT | Performed by: INTERNAL MEDICINE

## 2019-05-07 PROCEDURE — 82962 GLUCOSE BLOOD TEST: CPT

## 2019-05-07 PROCEDURE — 82550 ASSAY OF CK (CPK): CPT | Performed by: INTERNAL MEDICINE

## 2019-05-07 PROCEDURE — 93010 ELECTROCARDIOGRAM REPORT: CPT | Performed by: INTERNAL MEDICINE

## 2019-05-07 PROCEDURE — 99232 SBSQ HOSP IP/OBS MODERATE 35: CPT | Performed by: NURSE PRACTITIONER

## 2019-05-07 PROCEDURE — 93005 ELECTROCARDIOGRAM TRACING: CPT | Performed by: NURSE PRACTITIONER

## 2019-05-07 PROCEDURE — 84484 ASSAY OF TROPONIN QUANT: CPT | Performed by: NURSE PRACTITIONER

## 2019-05-07 PROCEDURE — 97110 THERAPEUTIC EXERCISES: CPT

## 2019-05-07 PROCEDURE — 85027 COMPLETE CBC AUTOMATED: CPT | Performed by: INTERNAL MEDICINE

## 2019-05-07 PROCEDURE — 99222 1ST HOSP IP/OBS MODERATE 55: CPT | Performed by: ORTHOPAEDIC SURGERY

## 2019-05-07 RX ORDER — CHOLECALCIFEROL (VITAMIN D3) 125 MCG
5 CAPSULE ORAL NIGHTLY PRN
Status: DISCONTINUED | OUTPATIENT
Start: 2019-05-07 | End: 2019-05-19 | Stop reason: HOSPADM

## 2019-05-07 RX ORDER — ZOLPIDEM TARTRATE 5 MG/1
5 TABLET ORAL NIGHTLY PRN
Status: DISPENSED | OUTPATIENT
Start: 2019-05-07 | End: 2019-05-17

## 2019-05-07 RX ADMIN — SODIUM CHLORIDE, PRESERVATIVE FREE 3 ML: 5 INJECTION INTRAVENOUS at 09:53

## 2019-05-07 RX ADMIN — CLOPIDOGREL 75 MG: 75 TABLET, FILM COATED ORAL at 09:52

## 2019-05-07 RX ADMIN — ATENOLOL 50 MG: 50 TABLET ORAL at 09:52

## 2019-05-07 RX ADMIN — ZOLPIDEM TARTRATE 5 MG: 5 TABLET ORAL at 21:55

## 2019-05-07 RX ADMIN — AMLODIPINE BESYLATE 5 MG: 5 TABLET ORAL at 09:52

## 2019-05-07 RX ADMIN — INSULIN LISPRO 3 UNITS: 100 INJECTION, SOLUTION INTRAVENOUS; SUBCUTANEOUS at 17:12

## 2019-05-07 RX ADMIN — ATORVASTATIN CALCIUM 40 MG: 20 TABLET, FILM COATED ORAL at 20:51

## 2019-05-07 RX ADMIN — INSULIN LISPRO 3 UNITS: 100 INJECTION, SOLUTION INTRAVENOUS; SUBCUTANEOUS at 12:04

## 2019-05-07 RX ADMIN — INSULIN LISPRO 3 UNITS: 100 INJECTION, SOLUTION INTRAVENOUS; SUBCUTANEOUS at 20:51

## 2019-05-07 RX ADMIN — MICONAZOLE NITRATE: 2 OINTMENT TOPICAL at 09:53

## 2019-05-07 RX ADMIN — MICONAZOLE NITRATE 1 APPLICATION: 2 OINTMENT TOPICAL at 20:54

## 2019-05-07 RX ADMIN — SODIUM CHLORIDE, PRESERVATIVE FREE 3 ML: 5 INJECTION INTRAVENOUS at 20:51

## 2019-05-07 RX ADMIN — Medication 5 MG: at 21:56

## 2019-05-07 NOTE — PLAN OF CARE
Problem: Patient Care Overview  Goal: Plan of Care Review  Outcome: Ongoing (interventions implemented as appropriate)   05/07/19 8400   Coping/Psychosocial   Plan of Care Reviewed With patient   Plan of Care Review   Progress no change   OTHER   Outcome Summary Swelling in left knee continues. Ice and elevation provided. VSS. Did not rest much overnight. Turned q2, incontinence care provided. Antifungal cream applied. Continue to monitor.        Problem: Fall Risk (Adult)  Goal: Absence of Fall  Outcome: Ongoing (interventions implemented as appropriate)      Problem: Pain, Acute (Adult)  Goal: Acceptable Pain Control/Comfort Level  Outcome: Ongoing (interventions implemented as appropriate)      Problem: Skin Injury Risk (Adult)  Goal: Skin Health and Integrity  Outcome: Ongoing (interventions implemented as appropriate)

## 2019-05-07 NOTE — PLAN OF CARE
Problem: Patient Care Overview  Goal: Plan of Care Review  Outcome: Ongoing (interventions implemented as appropriate)   05/07/19 1301   Coping/Psychosocial   Plan of Care Reviewed With patient   Plan of Care Review   Progress improving   OTHER   Outcome Summary Pt tolerated treatment with minimal c/o pain in left knee. Pt required modAX2 for bed mobility. Pt SBA for sitting balance. Pt performed exercises of the BLE and BUE while seated on EOB. Pt is currently NWB on left LE per MD.

## 2019-05-07 NOTE — PROGRESS NOTES
Discharge Planning Assessment  [facility]     Patient Name: Rock Maciel Jr.  MRN: [id number]  Today's Date: 5/7/2019    Admit Date: 5/5/2019    Discharge Needs Assessment     Row Name 05/07/19 1741       Living Environment    Lives With  alone    Current Living Arrangements  home/apartment/condo    Primary Care Provided by  self    Provides Primary Care For  no one    Family Caregiver if Needed  child(lois), adult    Family Caregiver Names  daughter    Quality of Family Relationships  helpful;supportive       Resource/Environmental Concerns    Resource/Environmental Concerns  none       Transition Planning    Patient/Family Anticipates Transition to  inpatient rehabilitation facility    Patient/Family Anticipated Services at Transition  rehabilitation services;skilled nursing    Transportation Anticipated  health plan transportation;family or friend will provide       Discharge Needs Assessment    Concerns to be Addressed  discharge planning    Equipment Currently Used at Home  none    Outpatient/Agency/Support Group Needs  skilled nursing facility    Discharge Facility/Level of Care Needs  nursing facility, skilled    Discharge Coordination/Progress  SNF referral pending        Discharge Plan     Row Name 05/07/19 1742       Plan    Plan  SNF referral pending, will need [facility]    Patient/Family in Agreement with Plan  yes    Plan Comments  IMM letter checked. CCP met with pt to discuss d/c planning. Facesheet verified. CCP role explained. Pt resides alone in a three level home and denies DME use. Pt has used [facility] in the past and been to [facility] for past sub-acute rehab. Pt confirms pharmacy is [facility]. Pt requests referral to [facility] where he has been to rehab in the past and where his mother resided for 12 years prior to her demise. CCP made referral and will follow for luis. Zulma Orosco LCSW        Destination      Service Provider Request  Status Selected Services Address Phone Number Fax Number    [facility] Pending - Request Sent N/A [address], [facility] [address] [phone]      Durable Medical Equipment      No service coordination in this encounter.      Dialysis/Infusion      No service coordination in this encounter.      Home Medical Care      No service coordination in this encounter.      Therapy      No service coordination in this encounter.      Community Resources      No service coordination in this encounter.          Demographic Summary     Row Name 05/07/19 1741       General Information    Admission Type  observation    Arrived From  home    Required Notices Provided  Observation Status Notice    Referral Source  admission list    Reason for Consult  discharge planning    Preferred Language  English        Functional Status     Row Name 05/07/19 1741       Functional Status    Usual Activity Tolerance  good    Current Activity Tolerance  poor       Functional Status, IADL    Medications  assistive equipment and person    Meal Preparation  assistive equipment and person    Housekeeping  assistive equipment and person    Laundry  assistive equipment and person    Shopping  assistive equipment and person       Mental Status Summary    Recent Changes in Mental Status/Cognitive Functioning  no changes        Psychosocial    No documentation.       Abuse/Neglect    No documentation.       Legal    No documentation.       Substance Abuse    No documentation.       Patient Forms    No documentation.           Rachna Orosco LCSW

## 2019-05-07 NOTE — THERAPY TREATMENT NOTE
Acute Care - Physical Therapy Treatment Note  [facility]     Patient Name: Rock Maciel Jr.  : 1945  MRN: [id number]  Today's Date: 2019  Onset of Illness/Injury or Date of Surgery: 19  Date of Referral to PT: 19  Referring Physician: Azra Fox APRN    Admit Date: 2019    Visit Dx:    ICD-10-CM ICD-9-CM   1. Fall, initial encounter W19.XXXA E888.9   2. Generalized weakness R53.1 780.79   3. Left leg pain M79.605 729.5   4. Immobility Z74.09 799.89     Patient Active Problem List   Diagnosis   • Abnormal ECG   • Arteriosclerosis of coronary artery   • Cardiac murmur   • Essential hypertension   • LAFB (left anterior fascicular block)   • Bundle branch block, right   • Neuropathic arthropathy   • Type 2 diabetes mellitus with circulatory disorder, without long-term current use of insulin (CMS/[facility])   • SHATSA (obstructive sleep apnea)   • Gain of weight   • Gout   • Class 1 obesity due to excess calories without serious comorbidity with body mass index (BMI) of 30.0 to 30.9 in adult   • 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 (CMS/HCC)   • Chronic venous insufficiency   • Nonrheumatic aortic valve stenosis   • Carotid stenosis, asymptomatic   • Bradycardia   • Hyperlipidemia   • Bilateral carotid artery disease (CMS/HCC)   • Abnormal cardiovascular stress test   • Renal insufficiency   • H/O aortic valve replacement with porcine valve   • Charcot-Davida-Tooth disease-like deformity of foot   • Amputated toe of right foot (CMS/HCC)   • Fall   • Non-traumatic rhabdomyolysis   • S/P CABG x 2   • CKD (chronic kidney disease) stage 3, GFR 30-59 ml/min (CMS/HCC)       Therapy Treatment    Rehabilitation Treatment Summary     Row Name 19 1137             Treatment Time/Intention    Discipline  physical therapy assistant  -      Document Type  therapy note (daily note)  -      Subjective Information  complains of;pain  -      Mode of Treatment  physical therapy  -       Patient/Family Observations  pt supine in bed with HOB elevated  -      Care Plan Review  patient/other agree to care plan  -      Therapy Frequency (PT Clinical Impression)  daily  -      Patient Effort  good  -      Existing Precautions/Restrictions  fall;non-weight bearing NWB on Left LE per MD  -2      Recorded by [] Henrietta Barker PTA 05/07/19 1138  [2] Henrietta Barker PTA 05/07/19 1143      Row Name 05/07/19 1137             Cognitive Assessment/Intervention- PT/OT    Orientation Status (Cognition)  oriented x 3  -EH      Follows Commands (Cognition)  WNL  -      Personal Safety Interventions  fall prevention program maintained;gait belt;nonskid shoes/slippers when out of bed  -      Recorded by [] Henrietta Barker PTA 05/07/19 1138      Row Name 05/07/19 1137             Bed Mobility Assessment/Treatment    Supine-Sit Evangeline (Bed Mobility)  moderate assist (50% patient effort)  -      Sit-Supine Evangeline (Bed Mobility)  moderate assist (50% patient effort);2 person assist  -      Bed Mobility, Safety Issues  decreased use of arms for pushing/pulling;decreased use of legs for bridging/pushing  -      Assistive Device (Bed Mobility)  bed rails;draw sheet;head of bed elevated  -      Recorded by [] Henrietta Barker PTA 05/07/19 1143      Row Name 05/07/19 1137             Transfer Assessment/Treatment    Comment (Transfers)  Per MD, pt is NWB on left LE at this time. nursing okay bed exercises  -      Recorded by [] Henrietta Barker PTA 05/07/19 1143      Row Name 05/07/19 1137             Therapeutic Exercise    Upper Extremity Range of Motion (Therapeutic Exercise)  shoulder flexion/extension, bilateral;elbow flexion/extension, bilateral scap retractions  -      Lower Extremity (Therapeutic Exercise)  marching while seated;LAQ (long arc quad), bilateral  -      Exercise Type (Therapeutic Exercise)  AAROM (active assistive range of motion);AROM (active range of motion)  ANTONIETTA Saab Left LE  -EH2      Position (Therapeutic Exercise)  seated  -EH2      Sets/Reps (Therapeutic Exercise)  -- X30 BLEs, X20 BUEs  -EH2      Recorded by [] Henrietta Barker [facility] 05/07/19 1143  [EH2] Henrietta Barker [facility] 05/07/19 1301      Row Name 05/07/19 1137             Positioning and Restraints    Pre-Treatment Position  in bed  -EH      Post Treatment Position  bed  -EH      In Bed  supine;call light within reach;encouraged to call for assist;exit alarm on  -EH      Recorded by [] SavannahHenrietta hassan, PTA 05/07/19 1301      Row Name                Residual Limb Assessment 05/06/19 0900 other (see comments)    Residual Limb Assessment - Properties Group Date first assessed: 05/06/19 [LL] Time first assessed: 0900 [LL] Amputation Date: -- [LL], unknown  Location: other (see comments) [LL], Left great toe, Right small toes (4 &5)  Recorded by:  [] Ling Aleman, RN 05/07/19 1218      User Key  (r) = Recorded By, (t) = Taken By, (c) = Cosigned By    Initials Name Effective Dates Discipline     Henrietta Barker, PTA 08/19/18 -  PT    LL Ling Aleman, RN 07/13/18 -  Nurse                   Physical Therapy Education     Title: PT OT SLP Therapies (In Progress)     Topic: Physical Therapy (Done)     Point: Mobility training (Done)     Learning Progress Summary           Patient Acceptance, E,D, VU,DU by  at 5/7/2019 11:36 AM    Acceptance, E, NR by CA at 5/6/2019 10:55 AM                   Point: Home exercise program (Done)     Learning Progress Summary           Patient Acceptance, E,D, VU,DU by  at 5/7/2019 11:36 AM    Acceptance, E, NR by CA at 5/6/2019 10:55 AM                   Point: Body mechanics (Done)     Learning Progress Summary           Patient Acceptance, E,D, VU,DU by  at 5/7/2019 11:36 AM    Acceptance, E, NR by CA at 5/6/2019 10:55 AM                   Point: Precautions (Done)     Learning Progress Summary           Patient Acceptance, E,D, VU,DU by  at 5/7/2019 11:36 AM     Acceptance, E, NR by CA at 5/6/2019 10:55 AM                               User Key     Initials Effective Dates Name Provider Type [facility] 08/19/18 -  Henrietta Barker PTA Physical Therapy Assistant PT    CA 06/13/18 -  Ayanna Mar PT Physical Therapist PT                PT Recommendation and Plan  Therapy Frequency (PT Clinical Impression): daily  Plan of Care Reviewed With: patient  Progress: improving  Outcome Summary: Pt tolerated treatment with minimal c/o pain in left knee. Pt required modAX2 for bed mobility. Pt SBA for sitting balance. Pt performed exercises of the BLE and BUE while seated on EOB. Pt is currently NWB on left LE per MD.   Outcome Measures     Row Name 05/07/19 1100 05/06/19 1000          How much help from another person do you currently need...    Turning from your back to your side while in flat bed without using bedrails?  2  -EH  2  -CA     Moving from lying on back to sitting on the side of a flat bed without bedrails?  2  -EH  2  -CA     Moving to and from a bed to a chair (including a wheelchair)?  1  -  1  -CA     Standing up from a chair using your arms (e.g., wheelchair, bedside chair)?  1  -  1  -CA     Climbing 3-5 steps with a railing?  1  -  1  -CA     To walk in hospital room?  1  -  1  -CA     AM-PAC 6 Clicks Score  8  -  8  -CA        Functional Assessment    Outcome Measure Options  --  AM-PAC 6 Clicks Basic Mobility (PT)  -CA       User Key  (r) = Recorded By, (t) = Taken By, (c) = Cosigned By    Initials Name Provider Type     Henrietta Barker PTA Physical Therapy Assistant    CA Ayanna Mar, REGAN Physical Therapist         Time Calculation:   PT Charges     Row Name 05/07/19 1135             Time Calculation    Start Time  1057  -      Stop Time  1120  -      Time Calculation (min)  23 min  -      PT Received On  05/07/19  -      PT - Next Appointment  05/08/19  -         Time Calculation- PT    Total Timed Code Minutes- PT  23  minute(s)  -        User Key  (r) = Recorded By, (t) = Taken By, (c) = Cosigned By    Initials Name Provider Type     Henrietta Barker PTA Physical Therapy Assistant        Therapy Charges for Today     Code Description Service Date Service Provider Modifiers Qty    64540898858 HC PT THER PROC EA 15 MIN 5/7/2019 Henrietta Barker PTA GP 2    38187112037 HC PT THER SUPP EA 15 MIN 5/7/2019 Henrietta Barker PTA GP 1          PT G-Codes  Outcome Measure Options: AM-PAC 6 Clicks Basic Mobility (PT)  AM-PAC 6 Clicks Score: 8    Henrietta Barker PTA  5/7/2019

## 2019-05-07 NOTE — H&P (VIEW-ONLY)
History & Physical       Patient: Rock Maciel Jr.    YOB: 1945    Medical Record Number: [id number]    Attending Physician: Jd Gatica MD    Chief Complaints: Left knee injury    History of Present Illness: 73 y.o. male presents for evaluation of the left knee.  The injury was sustained in a fall yesterday.  He fell landing directly on the front of his left knee, on concrete.  Afterwards, he was unable to stand or bear weight on the left lower extremity.  Current pain is described as moderate, constant, and aching.  Pain is worse with attempted movement or weightbearing.  He has a history of multiple previous foot surgeries but he was ambulatory prior to the fall.  He wears orthoses for both feet but does not typically use any assist devices for ambulation.  He lives alone.  His bedroom is on the second floor and he tells me he has multiple stairs in his home.    Allergies:   Allergies   Allergen Reactions   • Ceclor [Cefaclor] Rash       Home Medications:      Current Facility-Administered Medications:   •  acetaminophen (TYLENOL) tablet 650 mg, 650 mg, Oral, Q4H PRN, Azra Fox APRN  •  amLODIPine (NORVASC) tablet 5 mg, 5 mg, Oral, Daily, Jd Gatica MD, 5 mg at 05/07/19 0952  •  atenolol (TENORMIN) tablet 50 mg, 50 mg, Oral, Daily, Jd Gatica MD, 50 mg at 05/07/19 0952  •  atorvastatin (LIPITOR) tablet 40 mg, 40 mg, Oral, Nightly, Jd Gatica MD, 40 mg at 05/06/19 2156  •  clopidogrel (PLAVIX) tablet 75 mg, 75 mg, Oral, Daily, Jd Gatica MD, 75 mg at 05/07/19 0952  •  dextrose (D50W) 25 g/ 50mL Intravenous Solution 25 g, 25 g, Intravenous, Q15 Min PRN, Azra Fox APRN  •  dextrose (GLUTOSE) oral gel 15 g, 15 g, Oral, Q15 Min PRN, Azra Fox APRN  •  glucagon (human recombinant) (GLUCAGEN DIAGNOSTIC) injection 1 mg, 1 mg, Subcutaneous, PRN, Azra Fox APRN  •  HYDROcodone-acetaminophen (NORCO) 5-325 MG per tablet 1 tablet, 1  tablet, Oral, Q4H PRN, Jd Gatica MD  •  insulin lispro (humaLOG) injection 0-7 Units, 0-7 Units, Subcutaneous, 4x Daily With Meals & Nightly, Azra Fox APRN, 3 Units at 05/07/19 1204  •  miconazole nitrate (ALOE VESTA) 2 % ointment, , Topical, Q12H, Loki Carrion MD  •  ondansetron (ZOFRAN) tablet 4 mg, 4 mg, Oral, Q6H PRN **OR** ondansetron (ZOFRAN) injection 4 mg, 4 mg, Intravenous, Q6H PRN, Azra Fox, APRN  •  sodium chloride 0.9 % flush 1-10 mL, 1-10 mL, Intravenous, PRN, Azra Fox, APRN  •  [COMPLETED] Insert peripheral IV, , , Once **AND** sodium chloride 0.9 % flush 10 mL, 10 mL, Intravenous, PRN, Otoniel Brenner MD  •  sodium chloride 0.9 % flush 3 mL, 3 mL, Intravenous, Q12H, Azra Fox APRN, 3 mL at 05/07/19 0953    Past Medical History:   Diagnosis Date   • Allergic rhinitis    • Bronchitis    • Coronary artery disease    • Diabetes mellitus (CMS/[facility]) 2001   • DM (diabetes mellitus) (CMS/[facility])    • Encounter for annual health examination 05/06/2014    Annual Health Assessment   • Gout    • Hiatal hernia    • History of MRSA infection     RIGHT FOOT 2010   • Hyperlipidemia    • Hypertension    • Murmur    • Pneumothorax on right    • Sleep apnea     pt wears CPAP at night   • Wellness examination 06/24/2015    Annual Wellness Visit          Past Surgical History:   Procedure Laterality Date   • ARTERY SURGERY Bilateral     carotid   • CARDIAC CATHETERIZATION N/A 7/20/2018    Procedure: Left Heart Cath;  Surgeon: Jo Toney MD;  Location:  [facility];  Service: Cardiovascular   • CARDIAC CATHETERIZATION N/A 7/20/2018    Procedure: Coronary angiography;  Surgeon: Jo Toney MD;  Location:  [facility];  Service: Cardiovascular   • CAROTID ENDARTERECTOMY Bilateral    • COLONOSCOPY  2008   • CORONARY ARTERY BYPASS GRAFT WITH AORTIC VALVE REPAIR/REPLACEMENT N/A 7/23/2018    Procedure: INTRAOPERATIVE ALEX,  MIDLINE STERNOTOMY, CORONARY ARTERY BYPASS GRAFTING X 3 USING ENDOSCOPICALLY HARVESTED LEFT GREATER SAPHENOUS VEIN,  AORTIC VALVE REPLACEMENT USING 25MM LOPEZ II ULTRA PORCINE VALVE, PRP;  Surgeon: Phong Posey MD;  Location: [facility];  Service: Cardiothoracic   • FOOT SURGERY Right 2010    5th digit removal   • FOOT SURGERY Left 2011    1 digit removed   • THORACENTESIS Right 11/21/2016   • THORACOSCOPY Right 5/8/2017    Procedure: BRONCHOSCOPY, RIGHT VAT,  TOTAL DECORTICATION RIGHT LUNG, PLEURAL BX, PLACEMENT SUBPLEURAL PAIN CAATHETERS X2;  Surgeon: Donald Orlando III, MD;  Location: [facility];  Service:    • TONSILECTOMY, ADENOIDECTOMY, BILATERAL MYRINGOTOMY AND TUBES            Social History     Occupational History   • Occupation: retired from [employer]   Tobacco Use   • Smoking status: Never Smoker   • Smokeless tobacco: Never Used   Substance and Sexual Activity   • Alcohol use: Yes     Comment: either one glass wine or one glass liquor per  day   • Drug use: No   • Sexual activity: Defer      Social History     Social History Narrative   • Not on file          Family History   Problem Relation Age of Onset   • Hypertension Father        Review of Systems:     A 14 point review of systems is reviewed with the patient.  Pertinent positives are listed above.  All others negative.    Physical Exam: 73 y.o. male    Vitals:    05/06/19 1933 05/07/19 0337 05/07/19 0700 05/07/19 1135   BP: 147/65 144/64 152/57 155/64   BP Location: Left arm Left arm Left arm Left arm   Patient Position: Lying Lying Lying Lying   Pulse: 77 72 53 52   Resp: 16 20 18 18   Temp: 96.9 °F (36.1 °C) 97 °F (36.1 °C) 97 °F (36.1 °C) 97.9 °F (36.6 °C)   TempSrc: Oral Oral Oral Oral   SpO2: 96% 99% 98% 95%   Weight:       Height:           General:  Patient is awake and alert.  Appears in no acute distress or discomfort.    Psych:  Affect and demeanor are appropriate.    Eyes:  Conjunctiva and sclera appear grossly  normal.  Eyes track well and EOM seem to be intact.    Dentition:  No gross abnormalities noted.    Ears:  No gross abnormalities.  Hearing adequate for the exam.    Cardiovascular:  Regular rate and rhythm.    Lungs:  Good chest expansion.  Breathing unlabored.    Lymph:  No palpable masses or adenopathy in the affected extremity    Left lower extremity: There is anterior edema and ecchymosis at the knee. Skin appears benign.  No lacerations or abrasions.  Focal tenderness noted over the anterior aspect the knee with a palpable defect in the quad tendon.  No tenderness along the joint line, distal femur or proximal tibia.  No palpable masses or adenopathy.  Compartments soft in the calf and foot.  Painful, limited ROM of the knee.  Unable to actively extend.  Could not assess stability due to discomfort with motion.  He has significant deformity in his foot which is pre-existing.  He can weakly plantarflex and dorsiflex.  Intact sensation but it is slightly diminished which is his baseline.  Brisk cap refill.  Palpable posterior tibial pulse.      Diagnostic Tests:  Lab Results   Component Value Date    GLUCOSE 130 (H) 05/07/2019    CALCIUM 8.6 05/07/2019     05/07/2019    K 3.7 05/07/2019    CO2 24.2 05/07/2019     05/07/2019    BUN 39 (H) 05/07/2019    CREATININE 1.27 05/07/2019    EGFRIFAFRI 50 (L) 03/30/2018    EGFRIFNONA 55 (L) 05/07/2019    BCR 30.7 (H) 05/07/2019    ANIONGAP 7.8 05/07/2019     Lab Results   Component Value Date    WBC 6.15 05/07/2019    HGB 10.4 (L) 05/07/2019    HCT 33.7 (L) 05/07/2019    MCV 90.6 05/07/2019     05/07/2019     Lab Results   Component Value Date    INR 1.28 (H) 07/24/2018    INR 1.35 (H) 07/23/2018    INR 1.39 (H) 07/23/2018    PROTIME 15.8 (H) 07/24/2018    PROTIME 16.4 (H) 07/23/2018    PROTIME 16.8 (H) 07/23/2018       Imaging:  Outside AP and lateral views of the left femur are reviewed along with the associated report.  There is an apparent quad  tendon avulsion.  A CT scan of the left knee is also reviewed along with the associated report.  It appears that he has an quad tendon avulsion.    Assessment: Left quadriceps tendon rupture    Plan:  We had a thorough discussion regarding his options and the risks of non-surgical management versus surgery.  I explained that surgical risks include infection, hematoma, recurrent tearing necessitating revision, hardware related complications including failure of fixation, patella fracture, persistent pain and/or loss of motion, postoperative arthrofibrosis, iatrogenic nerve and/or blood vessel injury resulting in permanent weakness, numbness or dysfunction, RSD, DVT, PE, positioning related neuropraxia, and anesthesia related complications resulting in death.    Given the displacement and this patient's good premorbid function, I recommend a repair for him.  With his medical comorbidities he is going to need medical clearance.  I would also prefer that he be off of his Plavix for a week before surgery, if okay with the cardiology team.  Thank you for the consultation.  Please call with any questions or concerns.    Date: 5/7/2019    Camilo Hunter MD

## 2019-05-07 NOTE — CONSULTS
[facility]  Cardiology Consult Note    Patient Identification:  Name: Rock Maciel Jr.  Age: 73 y.o.  Sex: male  :  1945  MRN: [id number]             Requesting Physician: Jd Gatica MD    Reason for Consultation / Chief Complaint: Cardiac clearance.     History of Present Illness: This is a 73-year-old male, known to this service, who comes to us in consult from Dr. Jd Gatica for cardiac clearance for repair of left quadriceps tendon rupture status post fall at home.  The patient was admitted through the emergency department the evening of May 5 status post fall complaining of left knee pain.  The patient is unable to bear weight on his left lower extremity.  X-ray of femur was negative for fracture, CT scan on  revealed avulsion of quadriceps at upper pole of patella.  Blood pressure is elevated in the setting of pain, pressure is currently 155/64, patient noted to be bradycardic in the 50s.  Medical history includes CAD, diabetes mellitus, hyperlipidemia, hypertension, cardiac murmur, sleep apnea for which the patient wears CPAP at night and is compliant.  Electrolytes have been stable and within normal limits throughout admission it    Patient underwent stress testing in 2018 and this was negative for ischemia.  Limited echo in 2018 revealed normal LV function.  Echo May 25, 2018 revealed moderate dilation of LAC, mild MR/TR, EF 68%, calcification of aortic valve, mild to moderate aortic stenosis and no evidence of pericardial effusion.  Cardiac catheterization in 2018 revealed normal left main, LAD with descending irregularity with midportion 40 to 50% stenosis, circumflex with OM ostial 70% distal stenosis, RCA dominant with mid and distal portion 70% stenosed, severe aortic stenosis with gradient of 60 mmHg.  The patient then underwent CABG x2 with vein grafts to RCA and first marginal branch circumflex as well as aortic valve replacement with  porcine valve.       Comorbid cardiac risk factors: CAD, valvular heart disease, age, diabetes mellitus, hyperlipidemia, hypertension, mobility    Past Medical History:  Past Medical History:   Diagnosis Date   • Allergic rhinitis    • Bronchitis    • Coronary artery disease    • Diabetes mellitus (CMS/[facility]) 2001   • DM (diabetes mellitus) (CMS/[facility])    • Encounter for annual health examination 05/06/2014    Annual Health Assessment   • Gout    • Hiatal hernia    • History of MRSA infection     RIGHT FOOT 2010   • Hyperlipidemia    • Hypertension    • Murmur    • Pneumothorax on right    • Sleep apnea     pt wears CPAP at night   • Wellness examination 06/24/2015    Annual Wellness Visit     Past Surgical History:  Past Surgical History:   Procedure Laterality Date   • ARTERY SURGERY Bilateral     carotid   • CARDIAC CATHETERIZATION N/A 7/20/2018    Procedure: Left Heart Cath;  Surgeon: Jo Toney MD;  Location: [facility] INVASIVE LOCATION;  Service: Cardiovascular   • CARDIAC CATHETERIZATION N/A 7/20/2018    Procedure: Coronary angiography;  Surgeon: Jo Toney MD;  Location: [facility] CATH INVASIVE LOCATION;  Service: Cardiovascular   • CAROTID ENDARTERECTOMY Bilateral    • COLONOSCOPY  2008   • CORONARY ARTERY BYPASS GRAFT WITH AORTIC VALVE REPAIR/REPLACEMENT N/A 7/23/2018    Procedure: INTRAOPERATIVE ALEX, MIDLINE STERNOTOMY, CORONARY ARTERY BYPASS GRAFTING X 3 USING ENDOSCOPICALLY HARVESTED LEFT GREATER SAPHENOUS VEIN,  AORTIC VALVE REPLACEMENT USING 25MM LOPEZ II ULTRA PORCINE VALVE, PRP;  Surgeon: Phong Posey MD;  Location: [facility] OR;  Service: Cardiothoracic   • FOOT SURGERY Right 2010    5th digit removal   • FOOT SURGERY Left 2011    1 digit removed   • THORACENTESIS Right 11/21/2016   • THORACOSCOPY Right 5/8/2017    Procedure: BRONCHOSCOPY, RIGHT VAT,  TOTAL DECORTICATION RIGHT LUNG, PLEURAL BX, PLACEMENT SUBPLEURAL PAIN CAATHETERS X2;  Surgeon: Donald Orlando III, MD;   Location: [facility] MAIN OR;  Service:    • TONSILECTOMY, ADENOIDECTOMY, BILATERAL MYRINGOTOMY AND TUBES        Allergies:  Allergies   Allergen Reactions   • Ceclor [Cefaclor] Rash     Home Meds:  Medications Prior to Admission   Medication Sig Dispense Refill Last Dose   • amLODIPine (NORVASC) 5 MG tablet Take 1 tablet by mouth Daily. 90 tablet 1    • atenolol (TENORMIN) 50 MG tablet Take 1 tablet by mouth Daily. (Patient taking differently: Take 50 mg by mouth 2 (Two) Times a Day.) 180 tablet 1    • atorvastatin (LIPITOR) 40 MG tablet Take 1 tablet by mouth Every Night. 90 tablet 1    • Cholecalciferol (VITAMIN D3 PO) Take 2,000 Units by mouth Daily. PT HOLDING FOR SURGERY    Patient Taking Differently   • clopidogrel (PLAVIX) 75 MG tablet Take 1 tablet by mouth Daily. 90 tablet 1    • furosemide (LASIX) 40 MG tablet Take 1 tablet by mouth Daily. 90 tablet 1    • glipiZIDE (GLUCOTROL) 5 MG tablet 1 in am and 1 at 5 pm 180 tablet 1    • glucose blood (ACCU-CHEK BOB PLUS) test strip Use as instructed 200 each 12    • Lancets (ACCU-CHEK SOFT TOUCH) lancets ACCU-CHEK SOFTCLIX LANCETS 200 each 1    • metFORMIN ER (GLUCOPHAGE-XR) 750 MG 24 hr tablet Take 1 tablet by mouth 3 (Three) Times a Day With Meals. (Patient taking differently: Take 750 mg by mouth 2 (Two) Times a Day. Patient unsure if he takes it bid or tid but thinks it is bid) 270 tablet 1    • vitamin C (ASCORBIC ACID) 250 MG tablet Take 250 mg by mouth Daily.   Taking   • Omega-3 Fatty Acids (FISH OIL) 1000 MG capsule capsule Take 1,000 mg by mouth Daily With Breakfast. PT HOLDING FOR SURGERY   Taking     Current Meds:     Current Facility-Administered Medications:   •  acetaminophen (TYLENOL) tablet 650 mg, 650 mg, Oral, Q4H PRN, Azra Fox APRN  •  amLODIPine (NORVASC) tablet 5 mg, 5 mg, Oral, Daily, Jd Gatica MD, 5 mg at 05/07/19 0952  •  atenolol (TENORMIN) tablet 50 mg, 50 mg, Oral, Daily, Jd Gatica MD, 50 mg at 05/07/19 0952  •   atorvastatin (LIPITOR) tablet 40 mg, 40 mg, Oral, Nightly, Jd Gatica MD, 40 mg at 05/06/19 2156  •  dextrose (D50W) 25 g/ 50mL Intravenous Solution 25 g, 25 g, Intravenous, Q15 Min PRN, Azra Fox, APRN  •  dextrose (GLUTOSE) oral gel 15 g, 15 g, Oral, Q15 Min PRN, Azra Fox APRN  •  glucagon (human recombinant) (GLUCAGEN DIAGNOSTIC) injection 1 mg, 1 mg, Subcutaneous, PRN, Azra Fox, APRN  •  HYDROcodone-acetaminophen (NORCO) 5-325 MG per tablet 1 tablet, 1 tablet, Oral, Q4H PRN, Jd Gatica MD  •  insulin lispro (humaLOG) injection 0-7 Units, 0-7 Units, Subcutaneous, 4x Daily With Meals & Nightly, Azra Fox APRN, 3 Units at 05/07/19 1204  •  miconazole nitrate (ALOE VESTA) 2 % ointment, , Topical, Q12H, Loki Carrion MD  •  ondansetron (ZOFRAN) tablet 4 mg, 4 mg, Oral, Q6H PRN **OR** ondansetron (ZOFRAN) injection 4 mg, 4 mg, Intravenous, Q6H PRN, Azra Fox APRN  •  sodium chloride 0.9 % flush 1-10 mL, 1-10 mL, Intravenous, PRN, Azra Fox APRN  •  [COMPLETED] Insert peripheral IV, , , Once **AND** sodium chloride 0.9 % flush 10 mL, 10 mL, Intravenous, PRN, Otoniel Brenner MD  •  sodium chloride 0.9 % flush 3 mL, 3 mL, Intravenous, Q12H, Azra Fox APRN, 3 mL at 05/07/19 0953    Social History:   Social History     Tobacco Use   • Smoking status: Never Smoker   • Smokeless tobacco: Never Used   Substance Use Topics   • Alcohol use: Yes     Comment: either one glass wine or one glass liquor per  day      Family History:  Family History   Problem Relation Age of Onset   • Hypertension Father         Review of Systems  Constitutional: No wt loss, fever   Gastrointestinal: No nausea , abdominal pain  Behavioral/Psych: No insomnia or anxiety   Cardiovascular ----positive for sob. All other systems reviewed and are negative            Constitutional:  Temp:  [96.9 °F (36.1 °C)-97.9 °F (36.6 °C)] 97.9 °F (36.6 °C)  Heart Rate:  [52-77]  "52  Resp:  [16-20] 18  BP: (144-155)/(57-65) 155/64    Physical Exam   General:  Appears in no acute distress; resting comfortably in bed  Eyes: PERTL,  HEENT:  No JVD. Thyroid not visibly enlarged. No mucosal pallor or cyanosis  Respiratory: Respirations regular and unlabored at rest. BBS with good air entry anteriorly and laterally. No crackles, rubs or wheezes auscultated  Cardiovascular: S1S2 Regular rate and rhythm. No murmur, rub or gallop auscultated. No carotid bruits. DP/PT pulses 1+. No pretibial pitting edema RLE, LLE in brace and appears to be slightly edematous  Gastrointestinal: Abdomen soft, round, non tender. Bowel sounds present. No hepatosplenomegaly. No ascites  Musculoskeletal: MARSHALL x4. No abnormal movements, LLE movement limited d/t brace/injury  Extremities: No digital clubbing or cyanosis  Skin: Color pink. Skin warm and dry to touch. No rashes  No xanthoma  Neuro: AAO x3 CN II-XII grossly intact             Physical Exam  /64 (BP Location: Left arm, Patient Position: Lying)   Pulse 52   Temp 97.9 °F (36.6 °C) (Oral)   Resp 18   Ht 185.4 cm (73\")   Wt (!) 144 kg (316 lb 6 oz)   SpO2 95%   BMI 41.74 kg/m²     General appearance: No acute changes   Eyes: Sclera conjunctiva normal, pupils reactive   HENT: Atraumatic; oropharynx clear with moist mucous membranes and no mucosal ulcerations;  Neck: Trachea midline; NECK, supple, no thyromegaly or lymphadenopathy   Lungs: Normal size and shape, normal breath sounds, equal distribution of air, no rales and rhonchi   CV: S1-S2 regular, no murmurs, no rub, no gallop   Abdomen: Soft, non-tender; no masses , no abnormal abdominal sounds   Extremities: No deformity , normal color , no peripheral edema   Skin: Normal temperature, turgor and texture; no rash, ulcers  Psych: Appropriate affect, alert and oriented to person, place and time               Cardiographics  EC/2018 SR rate 70s        Telemetry:  unavailable      Echocardiogram: "   7/2018  Interpretation Summary     · Limited echo performed  · Suboptimal scan  · LV function appears to be normal  · No pericardial effusion     5/2018  Interpretation Summary     · Left atrial cavity size is moderately dilated.  · Mild mitral valve regurgitation is present  · Mild tricuspid valve regurgitation is present.  · Calculated EF = 68%.  · There is no evidence of pericardial effusion.  · There is calcification of the aortic valve.  · Mild to moderate aortic valve stenosis is present.              Stress test  9/2018  Interpretation Summary        · Equivocal ECG evidence of myocardial ischemia.Negative clinical evidence of myocardial ischemia. Findings consistent with an equivocal ECG stress test. Submaximal Stress Test.  · . Asymptomatic for chest pain     Submaximal Stress Test. Asymptomatic for chest pain for heart rate achieved. Specificity of study reduced secondary to motion artifact and inability to achieve target heart rate.   Ectopy: none.  Blood pressure response:  appropriate. Exercise tolerance poor.       Cardiac catheterization  7/2018  Interpretation Summary     · Left atrial cavity size is moderately dilated.  · Mild mitral valve regurgitation is present  · Mild tricuspid valve regurgitation is present.  · Calculated EF = 68%.  · There is no evidence of pericardial effusion.  · There is calcification of the aortic valve.  · Mild to moderate aortic valve stenosis is present.                Imaging      CT LE  IMPRESSION:  1. There appears to be extensor mechanism failure with quadriceps  insertional avulsion at the upper pole of the patella The quadriceps  tendon inserts on foci of displaced ossification suspected to represent  displaced avulsed cortical fragments or spurs,  Knee joint effusion with  surrounding soft tissue edema and extracapsular extension of fluid into  the anterior knee subcutaneous fat. Patellar tendon exhibits undulating  configuration and the patella is somewhat  low-lying.   2. Osteoarthritis with medial compartment joint space narrowing. Chronic  osteochondral lesion weightbearing surface medial femoral condyle.      Lab Review   Results from last 7 days   Lab Units 05/07/19  0639 05/06/19  0721 05/06/19  0000   CK TOTAL U/L 480* 517* 510*         Results from last 7 days   Lab Units 05/07/19  0639   SODIUM mmol/L 137   POTASSIUM mmol/L 3.7   BUN mg/dL 39*   CREATININE mg/dL 1.27   CALCIUM mg/dL 8.6       Results from last 7 days   Lab Units 05/07/19  0639 05/06/19  0721 05/06/19  0000   WBC 10*3/mm3 6.15 8.17 13.61*   HEMOGLOBIN g/dL 10.4* 11.0* 12.4*   HEMATOCRIT % 33.7* 34.7* 39.8   PLATELETS 10*3/mm3 174 197 221           I have reviewed the medical decision making with Dr. Toney in detail.       Assessment:    1.  Cardiac clearance  2.  CAD  3.  Hyperlipidemia  4.  Hypertension  5.  Left knee pain    Recommendations / Plan:     1.  Cardiac clearance-patient to undergo repair of left quadriceps tendon.  Last echo and stress test as dictated above.  Significant history of CAD and valvular heart disease.  Will obtain ECG, cardiac enzymes, and recheck echo.  Surgical clearance to be dependent upon labs/ECG.     2.  CAD-last cardiac cath as dictated above.  Patient underwent CABG x2 with aortic valve replacement, tissue, in July 2018. Currently denies chest pain and shortness of breath.    3.  Hyperlipidemia-patient currently on statin therapy.  Last lipid panel in July 2018 controlled.  Will repeat lipid panel in a.m., AST/ALT within normal limits 5/6.  Continue statin therapy.    4.  Hypertension-blood pressure is elevated in the setting of pain.  We will continue to monitor and titrate medications if needed once pain is better controlled.    5.  Left knee pain-status post fall.  Patient to undergo surgical repair once cleared from cardiac standpoint.    Discussed with MD, to be cleared pending ECG and labs.      Labs/tests ordered: echo, ecg, troponin, lipid,  mag Ivette Wills, APRN  5/7/2019, 4:10 PM      Patient personally interviewed and above subjective findings personally confirmed during a face to face contact with patient today  All findings of physical examination confirmed  All pertinent and performed labs, cardiac procedures ,  radiographs of the last 24 hours personally reviewed  Impression and plans discussed/elaborated and implemented jointly as described above     Jo Toney MD          EMR Dragon/Transcription:   Dictated utilizing Dragon dictation

## 2019-05-07 NOTE — CONSULTS
History & Physical       Patient: Rock Maciel Jr.    YOB: 1945    Medical Record Number: [id number]    Attending Physician: Jd Gatica MD    Chief Complaints: Left knee injury    History of Present Illness: 73 y.o. male presents for evaluation of the left knee.  The injury was sustained in a fall yesterday.  He fell landing directly on the front of his left knee, on concrete.  Afterwards, he was unable to stand or bear weight on the left lower extremity.  Current pain is described as moderate, constant, and aching.  Pain is worse with attempted movement or weightbearing.  He has a history of multiple previous foot surgeries but he was ambulatory prior to the fall.  He wears orthoses for both feet but does not typically use any assist devices for ambulation.  He lives alone.  His bedroom is on the second floor and he tells me he has multiple stairs in his home.    Allergies:   Allergies   Allergen Reactions   • Ceclor [Cefaclor] Rash       Home Medications:      Current Facility-Administered Medications:   •  acetaminophen (TYLENOL) tablet 650 mg, 650 mg, Oral, Q4H PRN, Azra Fox APRN  •  amLODIPine (NORVASC) tablet 5 mg, 5 mg, Oral, Daily, Jd Gatica MD, 5 mg at 05/07/19 0952  •  atenolol (TENORMIN) tablet 50 mg, 50 mg, Oral, Daily, Jd Gatica MD, 50 mg at 05/07/19 0952  •  atorvastatin (LIPITOR) tablet 40 mg, 40 mg, Oral, Nightly, Jd Gatica MD, 40 mg at 05/06/19 2156  •  clopidogrel (PLAVIX) tablet 75 mg, 75 mg, Oral, Daily, Jd Gatica MD, 75 mg at 05/07/19 0952  •  dextrose (D50W) 25 g/ 50mL Intravenous Solution 25 g, 25 g, Intravenous, Q15 Min PRN, Azra Fox APRN  •  dextrose (GLUTOSE) oral gel 15 g, 15 g, Oral, Q15 Min PRN, Azra Fox APRN  •  glucagon (human recombinant) (GLUCAGEN DIAGNOSTIC) injection 1 mg, 1 mg, Subcutaneous, PRN, Azra Fox APRN  •  HYDROcodone-acetaminophen (NORCO) 5-325 MG per tablet 1 tablet, 1  tablet, Oral, Q4H PRN, Jd Gatica MD  •  insulin lispro (humaLOG) injection 0-7 Units, 0-7 Units, Subcutaneous, 4x Daily With Meals & Nightly, Azra Fox APRN, 3 Units at 05/07/19 1204  •  miconazole nitrate (ALOE VESTA) 2 % ointment, , Topical, Q12H, Loki Carrion MD  •  ondansetron (ZOFRAN) tablet 4 mg, 4 mg, Oral, Q6H PRN **OR** ondansetron (ZOFRAN) injection 4 mg, 4 mg, Intravenous, Q6H PRN, Azra Fox, APRN  •  sodium chloride 0.9 % flush 1-10 mL, 1-10 mL, Intravenous, PRN, Azra Fox, APRN  •  [COMPLETED] Insert peripheral IV, , , Once **AND** sodium chloride 0.9 % flush 10 mL, 10 mL, Intravenous, PRN, Otoniel Brenner MD  •  sodium chloride 0.9 % flush 3 mL, 3 mL, Intravenous, Q12H, Azra Fox APRN, 3 mL at 05/07/19 0953    Past Medical History:   Diagnosis Date   • Allergic rhinitis    • Bronchitis    • Coronary artery disease    • Diabetes mellitus (CMS/[facility]) 2001   • DM (diabetes mellitus) (CMS/[facility])    • Encounter for annual health examination 05/06/2014    Annual Health Assessment   • Gout    • Hiatal hernia    • History of MRSA infection     RIGHT FOOT 2010   • Hyperlipidemia    • Hypertension    • Murmur    • Pneumothorax on right    • Sleep apnea     pt wears CPAP at night   • Wellness examination 06/24/2015    Annual Wellness Visit          Past Surgical History:   Procedure Laterality Date   • ARTERY SURGERY Bilateral     carotid   • CARDIAC CATHETERIZATION N/A 7/20/2018    Procedure: Left Heart Cath;  Surgeon: Jo Toney MD;  Location:  [facility] CATH INVASIVE LOCATION;  Service: Cardiovascular   • CARDIAC CATHETERIZATION N/A 7/20/2018    Procedure: Coronary angiography;  Surgeon: Jo Toney MD;  Location:  [facility] CATH INVASIVE LOCATION;  Service: Cardiovascular   • CAROTID ENDARTERECTOMY Bilateral    • COLONOSCOPY  2008   • CORONARY ARTERY BYPASS GRAFT WITH AORTIC VALVE REPAIR/REPLACEMENT N/A 7/23/2018    Procedure: INTRAOPERATIVE ALEX,  MIDLINE STERNOTOMY, CORONARY ARTERY BYPASS GRAFTING X 3 USING ENDOSCOPICALLY HARVESTED LEFT GREATER SAPHENOUS VEIN,  AORTIC VALVE REPLACEMENT USING 25MM LOPEZ II ULTRA PORCINE VALVE, PRP;  Surgeon: Phong Posey MD;  Location: [facility];  Service: Cardiothoracic   • FOOT SURGERY Right 2010    5th digit removal   • FOOT SURGERY Left 2011    1 digit removed   • THORACENTESIS Right 11/21/2016   • THORACOSCOPY Right 5/8/2017    Procedure: BRONCHOSCOPY, RIGHT VAT,  TOTAL DECORTICATION RIGHT LUNG, PLEURAL BX, PLACEMENT SUBPLEURAL PAIN CAATHETERS X2;  Surgeon: Donald Orlando III, MD;  Location: [facility];  Service:    • TONSILECTOMY, ADENOIDECTOMY, BILATERAL MYRINGOTOMY AND TUBES            Social History     Occupational History   • Occupation: retired from [employer]   Tobacco Use   • Smoking status: Never Smoker   • Smokeless tobacco: Never Used   Substance and Sexual Activity   • Alcohol use: Yes     Comment: either one glass wine or one glass liquor per  day   • Drug use: No   • Sexual activity: Defer      Social History     Social History Narrative   • Not on file          Family History   Problem Relation Age of Onset   • Hypertension Father        Review of Systems:     A 14 point review of systems is reviewed with the patient.  Pertinent positives are listed above.  All others negative.    Physical Exam: 73 y.o. male    Vitals:    05/06/19 1933 05/07/19 0337 05/07/19 0700 05/07/19 1135   BP: 147/65 144/64 152/57 155/64   BP Location: Left arm Left arm Left arm Left arm   Patient Position: Lying Lying Lying Lying   Pulse: 77 72 53 52   Resp: 16 20 18 18   Temp: 96.9 °F (36.1 °C) 97 °F (36.1 °C) 97 °F (36.1 °C) 97.9 °F (36.6 °C)   TempSrc: Oral Oral Oral Oral   SpO2: 96% 99% 98% 95%   Weight:       Height:           General:  Patient is awake and alert.  Appears in no acute distress or discomfort.    Psych:  Affect and demeanor are appropriate.    Eyes:  Conjunctiva and sclera appear grossly  normal.  Eyes track well and EOM seem to be intact.    Dentition:  No gross abnormalities noted.    Ears:  No gross abnormalities.  Hearing adequate for the exam.    Cardiovascular:  Regular rate and rhythm.    Lungs:  Good chest expansion.  Breathing unlabored.    Lymph:  No palpable masses or adenopathy in the affected extremity    Left lower extremity: There is anterior edema and ecchymosis at the knee. Skin appears benign.  No lacerations or abrasions.  Focal tenderness noted over the anterior aspect the knee with a palpable defect in the quad tendon.  No tenderness along the joint line, distal femur or proximal tibia.  No palpable masses or adenopathy.  Compartments soft in the calf and foot.  Painful, limited ROM of the knee.  Unable to actively extend.  Could not assess stability due to discomfort with motion.  He has significant deformity in his foot which is pre-existing.  He can weakly plantarflex and dorsiflex.  Intact sensation but it is slightly diminished which is his baseline.  Brisk cap refill.  Palpable posterior tibial pulse.      Diagnostic Tests:  Lab Results   Component Value Date    GLUCOSE 130 (H) 05/07/2019    CALCIUM 8.6 05/07/2019     05/07/2019    K 3.7 05/07/2019    CO2 24.2 05/07/2019     05/07/2019    BUN 39 (H) 05/07/2019    CREATININE 1.27 05/07/2019    EGFRIFAFRI 50 (L) 03/30/2018    EGFRIFNONA 55 (L) 05/07/2019    BCR 30.7 (H) 05/07/2019    ANIONGAP 7.8 05/07/2019     Lab Results   Component Value Date    WBC 6.15 05/07/2019    HGB 10.4 (L) 05/07/2019    HCT 33.7 (L) 05/07/2019    MCV 90.6 05/07/2019     05/07/2019     Lab Results   Component Value Date    INR 1.28 (H) 07/24/2018    INR 1.35 (H) 07/23/2018    INR 1.39 (H) 07/23/2018    PROTIME 15.8 (H) 07/24/2018    PROTIME 16.4 (H) 07/23/2018    PROTIME 16.8 (H) 07/23/2018       Imaging:  Outside AP and lateral views of the left femur are reviewed along with the associated report.  There is an apparent quad  tendon avulsion.  A CT scan of the left knee is also reviewed along with the associated report.  It appears that he has an quad tendon avulsion.    Assessment: Left quadriceps tendon rupture    Plan:  We had a thorough discussion regarding his options and the risks of non-surgical management versus surgery.  I explained that surgical risks include infection, hematoma, recurrent tearing necessitating revision, hardware related complications including failure of fixation, patella fracture, persistent pain and/or loss of motion, postoperative arthrofibrosis, iatrogenic nerve and/or blood vessel injury resulting in permanent weakness, numbness or dysfunction, RSD, DVT, PE, positioning related neuropraxia, and anesthesia related complications resulting in death.    Given the displacement and this patient's good premorbid function, I recommend a repair for him.  With his medical comorbidities he is going to need medical clearance.  I would also prefer that he be off of his Plavix for a week before surgery, if okay with the cardiology team.  Thank you for the consultation.  Please call with any questions or concerns.    Date: 5/7/2019    Camilo Hunter MD

## 2019-05-07 NOTE — PLAN OF CARE
Problem: Patient Care Overview  Goal: Plan of Care Review  Outcome: Ongoing (interventions implemented as appropriate)   05/07/19 8801   Coping/Psychosocial   Plan of Care Reviewed With patient   Plan of Care Review   Progress improving   OTHER   Outcome Summary Patient is to be non weight-bearing on left leg. Left leg immobilizer placed. Plavix stopped for future surgery for repair. Turn q2 hours. VSS. Zachary continue to monitor.      Goal: Individualization and Mutuality  Outcome: Ongoing (interventions implemented as appropriate)    Goal: Discharge Needs Assessment  Outcome: Ongoing (interventions implemented as appropriate)    Goal: Interprofessional Rounds/Family Conf  Outcome: Ongoing (interventions implemented as appropriate)      Problem: Fall Risk (Adult)  Goal: Absence of Fall  Outcome: Ongoing (interventions implemented as appropriate)      Problem: Pain, Acute (Adult)  Goal: Acceptable Pain Control/Comfort Level  Outcome: Ongoing (interventions implemented as appropriate)      Problem: Skin Injury Risk (Adult)  Goal: Skin Health and Integrity  Outcome: Ongoing (interventions implemented as appropriate)

## 2019-05-07 NOTE — DISCHARGE PLACEMENT REQUEST
"Madyson Maciel  (73 y.o. 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 1945 [id number]                 Emergency Contacts      (Rel.) Home Phone Work Phone Mobile Phone    Bing Sierra (Relative) [phone] -- --    Claudette Strong (Daughter) [phone] -- [phone]              "

## 2019-05-08 ENCOUNTER — APPOINTMENT (OUTPATIENT)
Dept: CARDIOLOGY | Facility: HOSPITAL | Age: 74
End: 2019-05-08

## 2019-05-08 LAB
BH CV ECHO MEAS - AO MAX PG (FULL): 17.1 MMHG
BH CV ECHO MEAS - AO MAX PG: 22.5 MMHG
BH CV ECHO MEAS - AO MEAN PG (FULL): 9 MMHG
BH CV ECHO MEAS - AO MEAN PG: 12 MMHG
BH CV ECHO MEAS - AO ROOT AREA (BSA CORRECTED): 1.5
BH CV ECHO MEAS - AO ROOT AREA: 11.3 CM^2
BH CV ECHO MEAS - AO ROOT DIAM: 3.8 CM
BH CV ECHO MEAS - AO V2 MAX: 237 CM/SEC
BH CV ECHO MEAS - AO V2 MEAN: 161 CM/SEC
BH CV ECHO MEAS - AO V2 VTI: 59.5 CM
BH CV ECHO MEAS - AVA(I,A): 2.5 CM^2
BH CV ECHO MEAS - AVA(I,D): 2.5 CM^2
BH CV ECHO MEAS - AVA(V,A): 2.2 CM^2
BH CV ECHO MEAS - AVA(V,D): 2.2 CM^2
BH CV ECHO MEAS - BSA(HAYCOCK): 2.8 M^2
BH CV ECHO MEAS - BSA: 2.6 M^2
BH CV ECHO MEAS - BZI_BMI: 41.7 KILOGRAMS/M^2
BH CV ECHO MEAS - BZI_METRIC_HEIGHT: 185.4 CM
BH CV ECHO MEAS - BZI_METRIC_WEIGHT: 143.3 KG
BH CV ECHO MEAS - EDV(CUBED): 205.4 ML
BH CV ECHO MEAS - EDV(MOD-SP2): 116 ML
BH CV ECHO MEAS - EDV(MOD-SP4): 136 ML
BH CV ECHO MEAS - EDV(TEICH): 173.2 ML
BH CV ECHO MEAS - EF(CUBED): 80.9 %
BH CV ECHO MEAS - EF(MOD-BP): 64 %
BH CV ECHO MEAS - EF(MOD-SP2): 52.6 %
BH CV ECHO MEAS - EF(MOD-SP4): 71.3 %
BH CV ECHO MEAS - EF(TEICH): 72.6 %
BH CV ECHO MEAS - ESV(CUBED): 39.3 ML
BH CV ECHO MEAS - ESV(MOD-SP2): 55 ML
BH CV ECHO MEAS - ESV(MOD-SP4): 39 ML
BH CV ECHO MEAS - ESV(TEICH): 47.4 ML
BH CV ECHO MEAS - FS: 42.4 %
BH CV ECHO MEAS - IVS/LVPW: 1.1
BH CV ECHO MEAS - IVSD: 1.4 CM
BH CV ECHO MEAS - LAT PEAK E' VEL: 11 CM/SEC
BH CV ECHO MEAS - LV DIASTOLIC VOL/BSA (35-75): 52 ML/M^2
BH CV ECHO MEAS - LV MASS(C)D: 358.9 GRAMS
BH CV ECHO MEAS - LV MASS(C)DI: 137.3 GRAMS/M^2
BH CV ECHO MEAS - LV MAX PG: 5.4 MMHG
BH CV ECHO MEAS - LV MEAN PG: 3 MMHG
BH CV ECHO MEAS - LV SYSTOLIC VOL/BSA (12-30): 14.9 ML/M^2
BH CV ECHO MEAS - LV V1 MAX: 116 CM/SEC
BH CV ECHO MEAS - LV V1 MEAN: 75.7 CM/SEC
BH CV ECHO MEAS - LV V1 VTI: 33.2 CM
BH CV ECHO MEAS - LVIDD: 5.9 CM
BH CV ECHO MEAS - LVIDS: 3.4 CM
BH CV ECHO MEAS - LVLD AP2: 8.2 CM
BH CV ECHO MEAS - LVLD AP4: 8.8 CM
BH CV ECHO MEAS - LVLS AP2: 7.8 CM
BH CV ECHO MEAS - LVLS AP4: 7.6 CM
BH CV ECHO MEAS - LVOT AREA (M): 4.5 CM^2
BH CV ECHO MEAS - LVOT AREA: 4.5 CM^2
BH CV ECHO MEAS - LVOT DIAM: 2.4 CM
BH CV ECHO MEAS - LVPWD: 1.3 CM
BH CV ECHO MEAS - MED PEAK E' VEL: 6 CM/SEC
BH CV ECHO MEAS - MV A DUR: 0.14 SEC
BH CV ECHO MEAS - MV A MAX VEL: 93.6 CM/SEC
BH CV ECHO MEAS - MV DEC SLOPE: 718 CM/SEC^2
BH CV ECHO MEAS - MV DEC TIME: 0.17 SEC
BH CV ECHO MEAS - MV E MAX VEL: 122 CM/SEC
BH CV ECHO MEAS - MV E/A: 1.3
BH CV ECHO MEAS - MV MAX PG: 8.4 MMHG
BH CV ECHO MEAS - MV MEAN PG: 3 MMHG
BH CV ECHO MEAS - MV P1/2T MAX VEL: 145 CM/SEC
BH CV ECHO MEAS - MV P1/2T: 59.1 MSEC
BH CV ECHO MEAS - MV V2 MAX: 145 CM/SEC
BH CV ECHO MEAS - MV V2 MEAN: 83.6 CM/SEC
BH CV ECHO MEAS - MV V2 VTI: 46.1 CM
BH CV ECHO MEAS - MVA P1/2T LCG: 1.5 CM^2
BH CV ECHO MEAS - MVA(P1/2T): 3.7 CM^2
BH CV ECHO MEAS - MVA(VTI): 3.3 CM^2
BH CV ECHO MEAS - PA ACC TIME: 0.07 SEC
BH CV ECHO MEAS - PA MAX PG (FULL): 1.4 MMHG
BH CV ECHO MEAS - PA MAX PG: 3.5 MMHG
BH CV ECHO MEAS - PA PR(ACCEL): 45.7 MMHG
BH CV ECHO MEAS - PA V2 MAX: 93.7 CM/SEC
BH CV ECHO MEAS - PULM A REVS DUR: 0.18 SEC
BH CV ECHO MEAS - PULM A REVS VEL: 23.3 CM/SEC
BH CV ECHO MEAS - PULM DIAS VEL: 71 CM/SEC
BH CV ECHO MEAS - PULM S/D: 0.91
BH CV ECHO MEAS - PULM SYS VEL: 64.5 CM/SEC
BH CV ECHO MEAS - PVA(V,A): 4.8 CM^2
BH CV ECHO MEAS - PVA(V,D): 4.8 CM^2
BH CV ECHO MEAS - QP/QS: 0.67
BH CV ECHO MEAS - RV MAX PG: 2.1 MMHG
BH CV ECHO MEAS - RV MEAN PG: 1 MMHG
BH CV ECHO MEAS - RV V1 MAX: 73.2 CM/SEC
BH CV ECHO MEAS - RV V1 MEAN: 46.9 CM/SEC
BH CV ECHO MEAS - RV V1 VTI: 16.4 CM
BH CV ECHO MEAS - RVOT AREA: 6.2 CM^2
BH CV ECHO MEAS - RVOT DIAM: 2.8 CM
BH CV ECHO MEAS - SI(AO): 258.2 ML/M^2
BH CV ECHO MEAS - SI(CUBED): 63.5 ML/M^2
BH CV ECHO MEAS - SI(LVOT): 57.5 ML/M^2
BH CV ECHO MEAS - SI(MOD-SP2): 23.3 ML/M^2
BH CV ECHO MEAS - SI(MOD-SP4): 37.1 ML/M^2
BH CV ECHO MEAS - SI(TEICH): 48.1 ML/M^2
BH CV ECHO MEAS - SV(AO): 674.8 ML
BH CV ECHO MEAS - SV(CUBED): 166.1 ML
BH CV ECHO MEAS - SV(LVOT): 150.2 ML
BH CV ECHO MEAS - SV(MOD-SP2): 61 ML
BH CV ECHO MEAS - SV(MOD-SP4): 97 ML
BH CV ECHO MEAS - SV(RVOT): 101 ML
BH CV ECHO MEAS - SV(TEICH): 125.8 ML
BH CV ECHO MEAS - TAPSE (>1.6): 2.6 CM2
BH CV ECHO MEASUREMENTS AVERAGE E/E' RATIO: 14.35
BH CV VAS BP RIGHT ARM: NORMAL MMHG
BH CV XLRA - RV BASE: 4.2 CM
BH CV XLRA - TDI S': 11 CM/SEC
CHOLEST SERPL-MCNC: 115 MG/DL (ref 0–200)
GLUCOSE BLDC GLUCOMTR-MCNC: 154 MG/DL (ref 70–130)
GLUCOSE BLDC GLUCOMTR-MCNC: 157 MG/DL (ref 70–130)
GLUCOSE BLDC GLUCOMTR-MCNC: 216 MG/DL (ref 70–130)
GLUCOSE BLDC GLUCOMTR-MCNC: 254 MG/DL (ref 70–130)
HDLC SERPL-MCNC: 39 MG/DL (ref 40–60)
LDLC SERPL CALC-MCNC: 61 MG/DL (ref 0–100)
LDLC/HDLC SERPL: 1.55 {RATIO}
LEFT ATRIUM VOLUME INDEX: 30 ML/M2
LV EF 2D ECHO EST: 64 %
MAGNESIUM SERPL-MCNC: 2.6 MG/DL (ref 1.6–2.4)
MAXIMAL PREDICTED HEART RATE: 146 BPM
STRESS TARGET HR: 124 BPM
TRIGL SERPL-MCNC: 77 MG/DL (ref 0–150)
TROPONIN T SERPL-MCNC: <0.01 NG/ML (ref 0–0.03)
VLDLC SERPL-MCNC: 15.4 MG/DL (ref 5–40)

## 2019-05-08 PROCEDURE — 80061 LIPID PANEL: CPT | Performed by: NURSE PRACTITIONER

## 2019-05-08 PROCEDURE — 93306 TTE W/DOPPLER COMPLETE: CPT

## 2019-05-08 PROCEDURE — 97110 THERAPEUTIC EXERCISES: CPT

## 2019-05-08 PROCEDURE — 82962 GLUCOSE BLOOD TEST: CPT

## 2019-05-08 PROCEDURE — 84484 ASSAY OF TROPONIN QUANT: CPT | Performed by: NURSE PRACTITIONER

## 2019-05-08 PROCEDURE — 93306 TTE W/DOPPLER COMPLETE: CPT | Performed by: INTERNAL MEDICINE

## 2019-05-08 PROCEDURE — 83735 ASSAY OF MAGNESIUM: CPT | Performed by: NURSE PRACTITIONER

## 2019-05-08 PROCEDURE — 63710000001 INSULIN LISPRO (HUMAN) PER 5 UNITS: Performed by: NURSE PRACTITIONER

## 2019-05-08 RX ADMIN — SODIUM CHLORIDE, PRESERVATIVE FREE 3 ML: 5 INJECTION INTRAVENOUS at 08:26

## 2019-05-08 RX ADMIN — ATORVASTATIN CALCIUM 40 MG: 20 TABLET, FILM COATED ORAL at 22:10

## 2019-05-08 RX ADMIN — ZOLPIDEM TARTRATE 5 MG: 5 TABLET ORAL at 23:08

## 2019-05-08 RX ADMIN — SODIUM CHLORIDE, PRESERVATIVE FREE 3 ML: 5 INJECTION INTRAVENOUS at 22:10

## 2019-05-08 RX ADMIN — INSULIN LISPRO 3 UNITS: 100 INJECTION, SOLUTION INTRAVENOUS; SUBCUTANEOUS at 12:07

## 2019-05-08 RX ADMIN — MICONAZOLE NITRATE: 2 OINTMENT TOPICAL at 08:33

## 2019-05-08 RX ADMIN — ATENOLOL 50 MG: 50 TABLET ORAL at 08:25

## 2019-05-08 RX ADMIN — MICONAZOLE NITRATE 1 APPLICATION: 2 OINTMENT TOPICAL at 22:15

## 2019-05-08 RX ADMIN — AMLODIPINE BESYLATE 5 MG: 5 TABLET ORAL at 08:25

## 2019-05-08 RX ADMIN — INSULIN LISPRO 4 UNITS: 100 INJECTION, SOLUTION INTRAVENOUS; SUBCUTANEOUS at 22:10

## 2019-05-08 NOTE — PLAN OF CARE
Problem: Patient Care Overview  Goal: Plan of Care Review  Outcome: Ongoing (interventions implemented as appropriate)   05/08/19 1050   Coping/Psychosocial   Plan of Care Reviewed With patient   Plan of Care Review   Progress improving   OTHER   Outcome Summary Pt increasing with standing tolerance and bed mobility to get to EOB. Pt is still NWB on L LE with KI donned at all times

## 2019-05-08 NOTE — PROGRESS NOTES
Name: Rock Maciel Jr. ADMIT: 2019   : 1945  PCP: Karen Red MD    MRN: [id number] LOS: 1 days   AGE/SEX: 73 y.o. male  ROOM: \[facility] [place] [facility]\""     Subjective   Subjective   CC: LLE pain, inability to bear weight  No acute events.  Patient is having no pain at rest.  Taking PO.  No f/c/n/v/d.  He had some trouble sleeping last night but has no other complaints.      Objective   Objective   Vital Signs  Temp:  [97 °F (36.1 °C)-97.9 °F (36.6 °C)] 97.9 °F (36.6 °C)  Heart Rate:  [52-72] 52  Resp:  [18-20] 18  BP: (144-155)/(57-64) 155/64  SpO2:  [95 %-99 %] 95 %  on  Flow (L/min):  [2] 2;   Device (Oxygen Therapy): room air  Body mass index is 41.74 kg/m².  Physical Exam   Constitutional: He is oriented to person, place, and time. No distress.   HENT:   Head: Normocephalic and atraumatic.   Mouth/Throat: Oropharynx is clear and moist.   Eyes: Conjunctivae and EOM are normal. Pupils are equal, round, and reactive to light.   Neck: Normal range of motion. Neck supple.   Cardiovascular: Normal rate, regular rhythm and intact distal pulses.   Pulmonary/Chest: Effort normal and breath sounds normal.   Abdominal: Soft. Bowel sounds are normal.   Musculoskeletal: He exhibits edema (left knee with effusion) and tenderness (left knee).   Neurological: He is alert and oriented to person, place, and time.   Skin: Skin is warm and dry. He is not diaphoretic.   Psychiatric: He has a normal mood and affect. His behavior is normal.   Nursing note and vitals reviewed.      Results Review:       I reviewed the patient's new clinical results.  Results from last 7 days   Lab Units 19  0639 19  0721 19  0000   WBC 10*3/mm3 6.15 8.17 13.61*   HEMOGLOBIN g/dL 10.4* 11.0* 12.4*   PLATELETS 10*3/mm3 174 197 221     Results from last 7 days   Lab Units 19  0639 19  0721 19  0000   SODIUM mmol/L 137 142 142   POTASSIUM mmol/L 3.7 4.0 4.3   CHLORIDE mmol/L 105 106 105   CO2 mmol/L 24.2 24.5 22.1    BUN mg/dL 39* 45* 49*   CREATININE mg/dL 1.27 1.38* 1.53*   GLUCOSE mg/dL 130* 134* 156*   Estimated Creatinine Clearance: 76.5 mL/min (by C-G formula based on SCr of 1.27 mg/dL).  Results from last 7 days   Lab Units 05/06/19  0000   ALBUMIN g/dL 4.10   BILIRUBIN mg/dL 0.6   ALK PHOS U/L 117   AST (SGOT) U/L 23   ALT (SGPT) U/L 14     Results from last 7 days   Lab Units 05/07/19  0639 05/06/19  0721 05/06/19  0000   CALCIUM mg/dL 8.6 8.8 9.2   ALBUMIN g/dL  --   --  4.10       Hemoglobin A1C   Date/Time Value Ref Range Status   05/06/2019 0721 6.00 (H) 4.80 - 5.60 % Final     Glucose   Date/Time Value Ref Range Status   05/07/2019 2044 229 (H) 70 - 130 mg/dL Final   05/07/2019 1629 202 (H) 70 - 130 mg/dL Final   05/07/2019 1132 209 (H) 70 - 130 mg/dL Final   05/07/2019 0731 123 70 - 130 mg/dL Final   05/06/2019 2010 175 (H) 70 - 130 mg/dL Final   05/06/2019 1558 160 (H) 70 - 130 mg/dL Final   05/06/2019 1121 201 (H) 70 - 130 mg/dL Final         amLODIPine 5 mg Oral Daily   atenolol 50 mg Oral Daily   atorvastatin 40 mg Oral Nightly   insulin lispro 0-7 Units Subcutaneous 4x Daily With Meals & Nightly   miconazole nitrate  Topical Q12H   sodium chloride 3 mL Intravenous Q12H      Diet Regular; Cardiac, Consistent Carbohydrate       Assessment/Plan     Active Hospital Problems    Diagnosis  POA   • Rupture of left quadriceps tendon [S76.112A]  Yes   • Fall [W19.XXXA]  Yes   • Non-traumatic rhabdomyolysis [M62.82]  Yes   • 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) [E11.59]  Yes   • SHASTA (obstructive sleep apnea) [G47.33]  Yes      Resolved Hospital Problems   No resolved problems to display.   Left Quadriceps Tendon  Rupture  - from mechanical fall  - orthopedic surgery consulted, planning operative repair after plavix washout  - cardiology has seen for pre-operative clearance     Type 2 DM  - controlled, A1C 6.00%  - on metformin and glipizide at home which is held  - cover with ssi     CKD stage 3  - baseline Cr appears to be 1.3-1.5  - will monitor     SHASTA  - he is on CPAP at home but has not brought it with him  - encouraged to have someone bring this if possible  - PRN nocturnal O2    VTE Prophylaxis - SCDs   Code Status - Full code  Disposition - TBD    Thanks to consultants.    Jd Gatica MD  [facility]  05/07/19  8:56 PM

## 2019-05-08 NOTE — PLAN OF CARE
Problem: Patient Care Overview  Goal: Plan of Care Review  Outcome: Ongoing (interventions implemented as appropriate)   05/08/19 0544   Coping/Psychosocial   Plan of Care Reviewed With patient   Plan of Care Review   Progress improving   OTHER   Outcome Summary patient continues to be non-weightbearing on left leg. left leg immobilizer remains in place. vitals remain stable and patient remains alert and oriented. plavix currenlty on hold for surgery and must be held for a week per md note. continuing to turn q 2 hours to prevent skin breakdown. applying miconazole ointment to excoriation in rodriguez-area per order. will continue to monitor.       Problem: Fall Risk (Adult)  Goal: Absence of Fall  Outcome: Ongoing (interventions implemented as appropriate)      Problem: Pain, Acute (Adult)  Goal: Acceptable Pain Control/Comfort Level  Outcome: Ongoing (interventions implemented as appropriate)      Problem: Skin Injury Risk (Adult)  Goal: Skin Health and Integrity  Outcome: Ongoing (interventions implemented as appropriate)

## 2019-05-08 NOTE — THERAPY TREATMENT NOTE
Acute Care - Physical Therapy Treatment Note  [facility]     Patient Name: Rock Maciel Jr.  : 1945  MRN: [id number]  Today's Date: 2019  Onset of Illness/Injury or Date of Surgery: 19  Date of Referral to PT: 19  Referring Physician: Azra Fox APRN    Admit Date: 2019    Visit Dx:    ICD-10-CM ICD-9-CM   1. Fall, initial encounter W19.XXXA E888.9   2. Generalized weakness R53.1 780.79   3. Left leg pain M79.605 729.5   4. 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 (CMS/HCC)   • Chronic venous insufficiency   • Nonrheumatic aortic valve stenosis   • Carotid stenosis, asymptomatic   • Bradycardia   • Hyperlipidemia   • Bilateral carotid artery disease (CMS/HCC)   • Abnormal cardiovascular stress test   • Renal insufficiency   • H/O aortic valve replacement with porcine valve   • Charcot-Davida-Tooth disease-like deformity of foot   • Amputated toe of right foot (CMS/HCC)   • Fall   • Non-traumatic rhabdomyolysis   • S/P CABG x 2   • CKD (chronic kidney disease) stage 3, GFR 30-59 ml/min (CMS/HCC)   • Rupture of left quadriceps tendon       Therapy Treatment    Rehabilitation Treatment Summary     Row Name 19 1000             Treatment Time/Intention    Discipline  physical therapy assistant  -CW      Document Type  therapy note (daily note)  -CW      Subjective Information  complains of;pain  -CW      Mode of  Treatment  physical therapy  -CW      Therapy Frequency (PT Clinical Impression)  daily  -CW      Patient Effort  good  -CW      Existing Precautions/Restrictions  fall;non-weight bearing NWB on Left LE per MD  -CW      Recorded by [CW] Andi Sorensen, PTA 05/08/19 1050      Row Name 05/08/19 1000             Vital Signs    O2 Delivery Pre Treatment  room air  -CW      Recorded by [CW] Andi Sorensen, PTA 05/08/19 1050      Row Name 05/08/19 1000             Cognitive Assessment/Intervention- PT/OT    Orientation Status (Cognition)  oriented x 3  -CW      Follows Commands (Cognition)  WNL  -CW      Personal Safety Interventions  fall prevention program maintained;gait belt;muscle strengthening facilitated;nonskid shoes/slippers when out of bed  -CW      Recorded by [CW] Andi Sorensen, PTA 05/08/19 1050      Row Name 05/08/19 1000             Bed Mobility Assessment/Treatment    Bed Mobility Assessment/Treatment  supine-sit;sit-supine  -CW      Supine-Sit [place] (Bed Mobility)  moderate assist (50% patient effort)  -CW      Sit-Supine [place] (Bed Mobility)  moderate assist (50% patient effort);2 person assist  -CW      Bed Mobility, Safety Issues  decreased use of arms for pushing/pulling;decreased use of legs for bridging/pushing  -CW      Assistive Device (Bed Mobility)  bed rails;draw sheet;head of bed elevated  -CW      Recorded by [CW] Andi Sorensen, PTA 05/08/19 1050      Row Name 05/08/19 1000             Transfer Assessment/Treatment    Transfer Assessment/Treatment  sit-stand transfer;stand-sit transfer  -CW      Recorded by [CW] Andi Sorensen, PTA 05/08/19 1050      Row Name 05/08/19 1000             Sit-Stand Transfer    Sit-Stand [place] (Transfers)  minimum assist (75% patient effort);2 person assist  -CW      Assistive Device (Sit-Stand Transfers)  bariatric;walker, front-wheeled  -CW      Recorded by [CW] Andi Sorensen, PTA 05/08/19 1050      Row Name  05/08/19 1000             Stand-Sit Transfer    Stand-Sit [place] (Transfers)  minimum assist (75% patient effort);2 person assist  -CW      Assistive Device (Stand-Sit Transfers)  bariatric;walker, front-wheeled  -CW      Recorded by [CW] Andi Sorensen, PTA 05/08/19 1050      Row Name 05/08/19 1000             Gait/Stairs Assessment/Training    [place] Level (Gait)  not tested;unable to assess  -CW      Recorded by [CW] Andi Sorensen, PTA 05/08/19 1050      Row Name 05/08/19 1000             Therapeutic Exercise    Lower Extremity (Therapeutic Exercise)  gluteal sets;LAQ (long arc quad), right;marching while seated  -CW      Lower Extremity Range of Motion (Therapeutic Exercise)  ankle dorsiflexion/plantar flexion, bilateral  -CW      Exercise Type (Therapeutic Exercise)  AROM (active range of motion)  -CW      Position (Therapeutic Exercise)  seated  -CW      Comment (Therapeutic Exercise)  10  -CW      Recorded by [CW] Andi Sorensen, PTA 05/08/19 1050      Row Name 05/08/19 1000             Balance    Balance  static sitting balance;static standing balance  -CW      Recorded by [CW] Andi Sorensen, PTA 05/08/19 1053      Row Name 05/08/19 1000             Static Sitting Balance    Level of [place] (Unsupported Sitting, Static Balance)  supervision  -CW      Sitting Position (Unsupported Sitting, Static Balance)  sitting on edge of bed  -CW      Time Able to Maintain Position (Unsupported Sitting, Static Balance)  more than 5 minutes  -CW      Recorded by [CW] Andi Sorensen, PTA 05/08/19 1053      Row Name 05/08/19 1000             Static Standing Balance    Level of [place] (Supported Standing, Static Balance)  contact guard assist;2 person assist  -CW      Time Able to Maintain Position (Supported Standing, Static Balance)  2 to 3 minutes  -CW      Assistive Device Utilized (Supported Standing, Static Balance)  walker, rolling  -CW      Recorded by [CW] Edu  Andi PICKARD, PTA 05/08/19 1053      Row Name 05/08/19 1000             Positioning and Restraints    Pre-Treatment Position  in bed  -CW      Post Treatment Position  bed  -CW      In Bed  notified nsg;supine;call light within reach;encouraged to call for assist  -CW      Recorded by [CW] Andi Sorensen, PTA 05/08/19 1050      Row Name                Residual Limb Assessment 05/06/19 0900 other (see comments)    Residual Limb Assessment - Properties Group Date first assessed: 05/06/19 [LL] Time first assessed: 0900 [LL] Amputation Date: -- [LL], unknown  Location: other (see comments) [LL], Left great toe, Right small toes (4 &5)  Recorded by:  [LL] Ling Aleman, RN 05/07/19 1218    Row Name 05/08/19 1000             Outcome Summary/Treatment Plan (PT)    Anticipated Discharge Disposition (PT)  skilled nursing facility  -CW      Recorded by [CW] Andi Sorensen, PTA 05/08/19 1050        User Key  (r) = Recorded By, (t) = Taken By, (c) = Cosigned By    Initials Name Effective Dates Discipline    CW Andi Sorensen, PTA 03/07/18 -  PT    LL Ling Aleman, RN 07/13/18 -  Nurse                   Physical Therapy Education     Title: PT OT SLP Therapies (In Progress)     Topic: Physical Therapy (Done)     Point: Mobility training (Done)     Learning Progress Summary           Patient Acceptance, E,TB, VU,DU by  at 5/8/2019 10:50 AM    Acceptance, E,D, VU,DU by  at 5/7/2019 11:36 AM    Acceptance, E, NR by CA at 5/6/2019 10:55 AM                   Point: Home exercise program (Done)     Learning Progress Summary           Patient Acceptance, E,TB, VU,DU by  at 5/8/2019 10:50 AM    Acceptance, E,D, VU,DU by  at 5/7/2019 11:36 AM    Acceptance, E, NR by CA at 5/6/2019 10:55 AM                   Point: Body mechanics (Done)     Learning Progress Summary           Patient Acceptance, E,TB, VU,DU by  at 5/8/2019 10:50 AM    Acceptance, E,D, VU,DU by  at 5/7/2019 11:36 AM    Acceptance, E, NR by  CA at 5/6/2019 10:55 AM                   Point: Precautions (Done)     Learning Progress Summary           Patient Acceptance, E,TB, VU,DU by  at 5/8/2019 10:50 AM    Acceptance, E,D, VU,DU by  at 5/7/2019 11:36 AM    Acceptance, E, NR by CA at 5/6/2019 10:55 AM                               User Key     Initials Effective Dates Name Provider Type Discipline     03/07/18 -  Andi Sorensen, PTA Physical Therapy Assistant PT     08/19/18 -  Henrietta Barker PTA Physical Therapy Assistant PT    CA 06/13/18 -  Ayanna Mar, REGAN Physical Therapist PT                PT Recommendation and Plan  Anticipated Discharge Disposition (PT): skilled nursing facility  Therapy Frequency (PT Clinical Impression): daily  Outcome Summary/Treatment Plan (PT)  Anticipated Discharge Disposition (PT): skilled nursing facility  Plan of Care Reviewed With: patient  Progress: improving  Outcome Summary: Pt increasing with standing tolerance and bed mobility to get to EOB.  Pt is still NWB on L LE with KI donned at all times  Outcome Measures     Row Name 05/08/19 1000 05/07/19 1100 05/06/19 1000       How much help from another person do you currently need...    Turning from your back to your side while in flat bed without using bedrails?  3  -  2  -EH  2  -CA    Moving from lying on back to sitting on the side of a flat bed without bedrails?  3  -CW  2  -  2  -CA    Moving to and from a bed to a chair (including a wheelchair)?  1  -  1  -  1  -CA    Standing up from a chair using your arms (e.g., wheelchair, bedside chair)?  2  -  1  -  1  -CA    Climbing 3-5 steps with a railing?  1  -CW  1  -EH  1  -CA    To walk in hospital room?  1  -  1  -EH  1  -CA    AM-PAC 6 Clicks Score  11  -  8  -  8  -CA       Functional Assessment    Outcome Measure Options  AM-PAC 6 Clicks Basic Mobility (PT)  -  --  AM-PAC 6 Clicks Basic Mobility (PT)  -CA      User Key  (r) = Recorded By, (t) = Taken By, (c) = Cosigned By     Initials Name Provider Type    Andi Delgado PTA Physical Therapy Assistant    Henrietta Zaidi, GORDO Physical Therapy Assistant    Ayanna Leon, PT Physical Therapist         Time Calculation:   PT Charges     Row Name 05/08/19 1053             Time Calculation    Start Time  1028  -CW      Stop Time  1053  -CW      Time Calculation (min)  25 min  -CW      PT Received On  05/08/19  -CW      PT - Next Appointment  05/09/19  -CW        User Key  (r) = Recorded By, (t) = Taken By, (c) = Cosigned By    Initials Name Provider Type    Andi Delgado PTA Physical Therapy Assistant        Therapy Charges for Today     Code Description Service Date Service Provider Modifiers Qty    33667418337 HC PT THER PROC EA 15 MIN 5/8/2019 Andi Sorensen PTA GP 2    19561994728 HC PT THER SUPP EA 15 MIN 5/8/2019 Andi Sorensen PTA GP 2          PT G-Codes  Outcome Measure Options: AM-PAC 6 Clicks Basic Mobility (PT)  AM-PAC 6 Clicks Score: 11    Andi Sorensen PTA  5/8/2019

## 2019-05-08 NOTE — PLAN OF CARE
Problem: Patient Care Overview  Goal: Plan of Care Review  Outcome: Ongoing (interventions implemented as appropriate)   05/08/19 1523   Coping/Psychosocial   Plan of Care Reviewed With patient   Plan of Care Review   Progress improving   OTHER   Outcome Summary Pt doing well today. No complaints of pain. Pt is a remind to turn. Accuchecks are done on pts ear lobes. Normally takes oral hyperglycemic medications, being held r/t surgery. Plavix is also being held for surgery. The plan is to have surgery on Tuesday, May 14. Will continue to monitor pt.      Goal: Individualization and Mutuality  Outcome: Ongoing (interventions implemented as appropriate)   05/06/19 0423   Individualization   Patient Specific Preferences PREFERS TO BE CALLED PHILLIP   Patient Specific Goals (Include Timeframe) ADEQUATE PAIN CONTROL AND IMPROVED MOBILITY   Patient Specific Interventions OFFER PAIN MEDS PRN AND ASST WITH MOBILITY       Problem: Fall Risk (Adult)  Goal: Absence of Fall  Outcome: Ongoing (interventions implemented as appropriate)   05/08/19 1523   Fall Risk (Adult)   Absence of Fall making progress toward outcome       Problem: Pain, Acute (Adult)  Goal: Acceptable Pain Control/Comfort Level  Outcome: Ongoing (interventions implemented as appropriate)   05/08/19 1523   Pain, Acute (Adult)   Acceptable Pain Control/Comfort Level making progress toward outcome       Problem: Skin Injury Risk (Adult)  Goal: Skin Health and Integrity  Outcome: Ongoing (interventions implemented as appropriate)   05/08/19 1523   Skin Injury Risk (Adult)   Skin Health and Integrity making progress toward outcome

## 2019-05-08 NOTE — PROGRESS NOTES
Name: Rock Maciel Jr. ADMIT: 2019   : 1945  PCP: Karen Red MD    MRN: [id number] LOS: 2 days   AGE/SEX: 73 y.o. male  ROOM: [facility]     Subjective   Subjective   CC: LLE pain, inability to bear weight  No acute events.  Patient is having no pain at rest.  Taking PO.  No f/c/n/v/d.  He had some trouble sleeping again last night but this was better than previously.      Objective   Objective   Vital Signs  Temp:  [96.7 °F (35.9 °C)-98.7 °F (37.1 °C)] 98.7 °F (37.1 °C)  Heart Rate:  [50-52] 52  Resp:  [16-18] 16  BP: (138-155)/(55-64) 139/58  SpO2:  [95 %-99 %] 98 %  on  Flow (L/min):  [2] 2;   Device (Oxygen Therapy): room air  Body mass index is 41.74 kg/m².  Physical Exam   Constitutional: He is oriented to person, place, and time. No distress.   HENT:   Head: Normocephalic and atraumatic.   Mouth/Throat: Oropharynx is clear and moist.   Eyes: Conjunctivae and EOM are normal. Pupils are equal, round, and reactive to light.   Neck: Normal range of motion. Neck supple.   Cardiovascular: Normal rate, regular rhythm and intact distal pulses.   Pulmonary/Chest: Effort normal and breath sounds normal.   Abdominal: Soft. Bowel sounds are normal.   Musculoskeletal: He exhibits edema (left knee with effusion) and tenderness (left knee).   Neurological: He is alert and oriented to person, place, and time.   Skin: Skin is warm and dry. He is not diaphoretic.   Psychiatric: He has a normal mood and affect. His behavior is normal.   Nursing note and vitals reviewed.      Results Review:       I reviewed the patient's new clinical results.  Results from last 7 days   Lab Units 19  0639 19  0721 19  0000   WBC 10*3/mm3 6.15 8.17 13.61*   HEMOGLOBIN g/dL 10.4* 11.0* 12.4*   PLATELETS 10*3/mm3 174 197 221     Results from last 7 days   Lab Units 19  0639 19  0721 19  0000   SODIUM mmol/L 137 142 142   POTASSIUM mmol/L 3.7 4.0 4.3   CHLORIDE mmol/L 105 106 105   CO2 mmol/L  24.2 24.5 22.1   BUN mg/dL 39* 45* 49*   CREATININE mg/dL 1.27 1.38* 1.53*   GLUCOSE mg/dL 130* 134* 156*   Estimated Creatinine Clearance: 76.5 mL/min (by C-G formula based on SCr of 1.27 mg/dL).  Results from last 7 days   Lab Units 05/06/19  0000   ALBUMIN g/dL 4.10   BILIRUBIN mg/dL 0.6   ALK PHOS U/L 117   AST (SGOT) U/L 23   ALT (SGPT) U/L 14     Results from last 7 days   Lab Units 05/08/19  0554 05/07/19  0639 05/06/19  0721 05/06/19  0000   CALCIUM mg/dL  --  8.6 8.8 9.2   ALBUMIN g/dL  --   --   --  4.10   MAGNESIUM mg/dL 2.6*  --   --   --        Hemoglobin A1C   Date/Time Value Ref Range Status   05/06/2019 0721 6.00 (H) 4.80 - 5.60 % Final     Glucose   Date/Time Value Ref Range Status   05/08/2019 0730 154 (H) 70 - 130 mg/dL Final   05/07/2019 2044 229 (H) 70 - 130 mg/dL Final   05/07/2019 1629 202 (H) 70 - 130 mg/dL Final   05/07/2019 1132 209 (H) 70 - 130 mg/dL Final   05/07/2019 0731 123 70 - 130 mg/dL Final   05/06/2019 2010 175 (H) 70 - 130 mg/dL Final   05/06/2019 1558 160 (H) 70 - 130 mg/dL Final         amLODIPine 5 mg Oral Daily   atenolol 50 mg Oral Daily   atorvastatin 40 mg Oral Nightly   insulin lispro 0-7 Units Subcutaneous 4x Daily With Meals & Nightly   miconazole nitrate  Topical Q12H   sodium chloride 3 mL Intravenous Q12H      Diet Regular; Cardiac, Consistent Carbohydrate       Assessment/Plan     Active Hospital Problems    Diagnosis  POA   • Rupture of left quadriceps tendon [S76.112A]  Yes   • Fall [W19.XXXA]  Yes   • Non-traumatic rhabdomyolysis [M62.82]  Yes   • 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   Left Quadriceps Tendon Rupture  - from mechanical fall  - orthopedic surgery consulted, planning operative repair after plavix washout  - cardiology has seen for pre-operative clearance, appreciate recs     Type 2 DM  - controlled, A1C 6.00%  - on metformin and glipizide at home which is held  - cover with ssi     CKD stage 3  - baseline Cr appears to be 1.3-1.5  - will monitor     SHASTA  - he is on CPAP at home but has not brought it with him and he tells me he has no one to do so  - will order auto CPAP, can consult pulm if needed  - incentive spirometry ordered, d/w RN    Skin Cancer  - likely SCC, occipital region  - follows dermatology as outpatient and was supposed to get this removed, he will need to reschedule    VTE Prophylaxis - SCDs   Code Status - Full code  Disposition - TBD    Thanks to consultants.    Jd Gatica MD  [facility]  05/08/19  9:54 AM

## 2019-05-09 LAB
GLUCOSE BLDC GLUCOMTR-MCNC: 116 MG/DL (ref 70–130)
GLUCOSE BLDC GLUCOMTR-MCNC: 211 MG/DL (ref 70–130)
GLUCOSE BLDC GLUCOMTR-MCNC: 259 MG/DL (ref 70–130)
GLUCOSE BLDC GLUCOMTR-MCNC: 292 MG/DL (ref 70–130)

## 2019-05-09 PROCEDURE — 82962 GLUCOSE BLOOD TEST: CPT

## 2019-05-09 PROCEDURE — 94762 N-INVAS EAR/PLS OXIMTRY CONT: CPT

## 2019-05-09 PROCEDURE — 5A09357 ASSISTANCE WITH RESPIRATORY VENTILATION, LESS THAN 24 CONSECUTIVE HOURS, CONTINUOUS POSITIVE AIRWAY PRESSURE: ICD-10-PCS | Performed by: INTERNAL MEDICINE

## 2019-05-09 PROCEDURE — 94799 UNLISTED PULMONARY SVC/PX: CPT

## 2019-05-09 PROCEDURE — 99232 SBSQ HOSP IP/OBS MODERATE 35: CPT | Performed by: NURSE PRACTITIONER

## 2019-05-09 PROCEDURE — 25010000002 ENOXAPARIN PER 10 MG: Performed by: INTERNAL MEDICINE

## 2019-05-09 PROCEDURE — 94660 CPAP INITIATION&MGMT: CPT

## 2019-05-09 PROCEDURE — 97110 THERAPEUTIC EXERCISES: CPT

## 2019-05-09 PROCEDURE — 63710000001 INSULIN LISPRO (HUMAN) PER 5 UNITS: Performed by: NURSE PRACTITIONER

## 2019-05-09 RX ADMIN — SODIUM CHLORIDE, PRESERVATIVE FREE 3 ML: 5 INJECTION INTRAVENOUS at 21:54

## 2019-05-09 RX ADMIN — INSULIN LISPRO 4 UNITS: 100 INJECTION, SOLUTION INTRAVENOUS; SUBCUTANEOUS at 09:29

## 2019-05-09 RX ADMIN — ATORVASTATIN CALCIUM 40 MG: 20 TABLET, FILM COATED ORAL at 21:54

## 2019-05-09 RX ADMIN — INSULIN LISPRO 4 UNITS: 100 INJECTION, SOLUTION INTRAVENOUS; SUBCUTANEOUS at 12:40

## 2019-05-09 RX ADMIN — ATENOLOL 50 MG: 50 TABLET ORAL at 09:29

## 2019-05-09 RX ADMIN — ENOXAPARIN SODIUM 40 MG: 40 INJECTION SUBCUTANEOUS at 17:41

## 2019-05-09 RX ADMIN — INSULIN LISPRO 3 UNITS: 100 INJECTION, SOLUTION INTRAVENOUS; SUBCUTANEOUS at 21:54

## 2019-05-09 RX ADMIN — MICONAZOLE NITRATE: 2 OINTMENT TOPICAL at 09:30

## 2019-05-09 RX ADMIN — MICONAZOLE NITRATE: 2 OINTMENT TOPICAL at 21:54

## 2019-05-09 RX ADMIN — SODIUM CHLORIDE, PRESERVATIVE FREE 3 ML: 5 INJECTION INTRAVENOUS at 09:30

## 2019-05-09 RX ADMIN — AMLODIPINE BESYLATE 5 MG: 5 TABLET ORAL at 09:29

## 2019-05-09 RX ADMIN — ZOLPIDEM TARTRATE 5 MG: 5 TABLET ORAL at 23:00

## 2019-05-09 NOTE — PROGRESS NOTES
Name: Rock Maciel Jr. ADMIT: 2019   : 1945  PCP: Karen Red MD    MRN: [id number] LOS: 3 days   AGE/SEX: 73 y.o. male  ROOM: [facility]     Subjective   Subjective   CC: LLE pain, inability to bear weight  No acute events.  Patient is having no pain at rest.  No new complaints.  Taking PO.  No f/c/n/v/d.      Objective   Objective   Vital Signs  Temp:  [97.3 °F (36.3 °C)-97.7 °F (36.5 °C)] 97.7 °F (36.5 °C)  Heart Rate:  [46-56] 46  Resp:  [16-18] 18  BP: (127-151)/(52-64) 129/60  SpO2:  [95 %-98 %] 97 %  on  Flow (L/min):  [2] 2;   Device (Oxygen Therapy): room air  Body mass index is 41.69 kg/m².  Physical Exam   Constitutional: He is oriented to person, place, and time. No distress.   HENT:   Head: Normocephalic and atraumatic.   Mouth/Throat: Oropharynx is clear and moist.   Eyes: Conjunctivae and EOM are normal. Pupils are equal, round, and reactive to light.   Neck: Normal range of motion. Neck supple.   Cardiovascular: Normal rate, regular rhythm and intact distal pulses.   Pulmonary/Chest: Effort normal and breath sounds normal.   Abdominal: Soft. Bowel sounds are normal.   Musculoskeletal: He exhibits edema (left knee with effusion) and tenderness (left knee).   Neurological: He is alert and oriented to person, place, and time.   Skin: Skin is warm and dry. He is not diaphoretic.   Psychiatric: He has a normal mood and affect. His behavior is normal.   Nursing note and vitals reviewed.      Results Review:       I reviewed the patient's new clinical results.  Results from last 7 days   Lab Units 19  0639 19  0721 19  0000   WBC 10*3/mm3 6.15 8.17 13.61*   HEMOGLOBIN g/dL 10.4* 11.0* 12.4*   PLATELETS 10*3/mm3 174 197 221     Results from last 7 days   Lab Units 19  0639 19  0721 19  0000   SODIUM mmol/L 137 142 142   POTASSIUM mmol/L 3.7 4.0 4.3   CHLORIDE mmol/L 105 106 105   CO2 mmol/L 24.2 24.5 22.1   BUN mg/dL 39* 45* 49*   CREATININE mg/dL 1.27  1.38* 1.53*   GLUCOSE mg/dL 130* 134* 156*   Estimated Creatinine Clearance: 75.8 mL/min (by C-G formula based on SCr of 1.27 mg/dL).  Results from last 7 days   Lab Units 05/06/19  0000   ALBUMIN g/dL 4.10   BILIRUBIN mg/dL 0.6   ALK PHOS U/L 117   AST (SGOT) U/L 23   ALT (SGPT) U/L 14     Results from last 7 days   Lab Units 05/08/19  0554 05/07/19  0639 05/06/19  0721 05/06/19  0000   CALCIUM mg/dL  --  8.6 8.8 9.2   ALBUMIN g/dL  --   --   --  4.10   MAGNESIUM mg/dL 2.6*  --   --   --        Glucose   Date/Time Value Ref Range Status   05/09/2019 1147 259 (H) 70 - 130 mg/dL Final   05/09/2019 0918 292 (H) 70 - 130 mg/dL Final   05/08/2019 2113 254 (H) 70 - 130 mg/dL Final   05/08/2019 1657 157 (H) 70 - 130 mg/dL Final   05/08/2019 1137 216 (H) 70 - 130 mg/dL Final   05/08/2019 0730 154 (H) 70 - 130 mg/dL Final   05/07/2019 2044 229 (H) 70 - 130 mg/dL Final         amLODIPine 5 mg Oral Daily   atenolol 50 mg Oral Daily   atorvastatin 40 mg Oral Nightly   insulin lispro 0-7 Units Subcutaneous 4x Daily With Meals & Nightly   miconazole nitrate  Topical Q12H   sodium chloride 3 mL Intravenous Q12H      Diet Regular; Cardiac, Consistent Carbohydrate  NPO Diet NPO Except: Sips With Meds       Assessment/Plan     Active Hospital Problems    Diagnosis  POA   • Rupture of left quadriceps tendon [S76.112A]  Yes   • Fall [W19.XXXA]  Yes   • Non-traumatic rhabdomyolysis [M62.82]  Yes   • 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   Left Quadriceps Tendon Rupture  - from mechanical fall  - orthopedic  surgery consulted, planning operative repair after plavix washout  - cardiology has seen for pre-operative clearance, appreciate recs     Type 2 DM  - controlled, A1C 6.00%  - on metformin and glipizide at home which is held  - cover with ssi     CKD stage 3  - baseline Cr appears to be 1.3-1.5  - will monitor     SHASTA  - he is on CPAP at home but has not brought it with him and he tells me he has no one to do so  - pulm consulted, on autoCPAP and cleared for surgery from pulmonary standpoint; appreciate recs  - encourage incentive spirometry    Skin Cancer  - 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    Jd Gatica MD  [facility]  05/09/19  5:08 PM

## 2019-05-09 NOTE — THERAPY TREATMENT NOTE
Acute Care - Physical Therapy Treatment Note  [facility]     Patient Name: Rock Maciel Jr.  : 1945  MRN: [id number]  Today's Date: 2019  Onset of Illness/Injury or Date of Surgery: 19  Date of Referral to PT: 19  Referring Physician: Azra Fox APRN    Admit Date: 2019    Visit Dx:    ICD-10-CM ICD-9-CM   1. Fall, initial encounter W19.XXXA E888.9   2. Generalized weakness R53.1 780.79   3. Left leg pain M79.605 729.5   4. 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 (CMS/HCC)   • Chronic venous insufficiency   • Nonrheumatic aortic valve stenosis   • Carotid stenosis, asymptomatic   • Bradycardia   • Hyperlipidemia   • Bilateral carotid artery disease (CMS/HCC)   • Abnormal cardiovascular stress test   • Renal insufficiency   • H/O aortic valve replacement with porcine valve   • Charcot-Davida-Tooth disease-like deformity of foot   • Amputated toe of right foot (CMS/HCC)   • Fall   • Non-traumatic rhabdomyolysis   • S/P CABG x 2   • CKD (chronic kidney disease) stage 3, GFR 30-59 ml/min (CMS/HCC)   • Rupture of left quadriceps tendon       Therapy Treatment    Rehabilitation Treatment Summary     Row Name 19 1300             Treatment Time/Intention    Discipline  physical therapy assistant  -CW      Document Type  therapy note (daily note)  -CW      Subjective Information  complains of;weakness;fatigue  -CW       Mode of Treatment  physical therapy  -CW      Therapy Frequency (PT Clinical Impression)  daily  -CW      Patient Effort  good  -CW      Existing Precautions/Restrictions  fall;non-weight bearing NWB on Left LE per MD  -CW      Recorded by [CW] Andi Sorensen, PTA 05/09/19 1334      Row Name 05/09/19 1300             Vital Signs    O2 Delivery Pre Treatment  room air  -CW      Recorded by [CW] Andi Sorensen, PTA 05/09/19 1334      Row Name 05/09/19 1300             Cognitive Assessment/Intervention- PT/OT    Orientation Status (Cognition)  oriented x 3  -CW      Follows Commands (Cognition)  WNL  -CW      Personal Safety Interventions  fall prevention program maintained;gait belt;muscle strengthening facilitated;nonskid shoes/slippers when out of bed  -CW      Recorded by [CW] Andi Sorensen, PTA 05/09/19 1334      Row Name 05/09/19 1300             Bed Mobility Assessment/Treatment    Bed Mobility Assessment/Treatment  supine-sit;sit-supine  -CW      Supine-Sit [place] (Bed Mobility)  moderate assist (50% patient effort)  -CW      Sit-Supine [place] (Bed Mobility)  moderate assist (50% patient effort);2 person assist  -CW      Bed Mobility, Safety Issues  decreased use of arms for pushing/pulling;decreased use of legs for bridging/pushing  -CW      Assistive Device (Bed Mobility)  bed rails;draw sheet;head of bed elevated  -CW      Recorded by [CW] Andi Sorensen, PTA 05/09/19 1334      Row Name 05/09/19 1300             Transfer Assessment/Treatment    Transfer Assessment/Treatment  sit-stand transfer;stand-sit transfer  -CW      Recorded by [CW] Andi Sorensen, PTA 05/09/19 1334      Row Name 05/09/19 1300             Sit-Stand Transfer    Sit-Stand [place] (Transfers)  minimum assist (75% patient effort);2 person assist  -CW      Assistive Device (Sit-Stand Transfers)  bariatric;walker, front-wheeled  -CW      Recorded by [CW] Andi Sorensen, PTA 05/09/19 1330       Row Name 05/09/19 1300             Stand-Sit Transfer    Stand-Sit [place] (Transfers)  minimum assist (75% patient effort);2 person assist  -CW      Assistive Device (Stand-Sit Transfers)  bariatric;walker, front-wheeled  -CW      Recorded by [CW] Andi Sorensen, PTA 05/09/19 1334      Row Name 05/09/19 1300             Gait/Stairs Assessment/Training    [place] Level (Gait)  not tested;unable to assess  -CW      Recorded by [CW] Andi Sorensen, PTA 05/09/19 1334      Row Name 05/09/19 1300             Therapeutic Exercise    Lower Extremity (Therapeutic Exercise)  gluteal sets;quad sets, bilateral;SLR (straight leg raise), bilateral  -CW      Lower Extremity Range of Motion (Therapeutic Exercise)  hip flexion/extension, right;hip abduction/adduction, bilateral;ankle dorsiflexion/plantar flexion, bilateral  -CW      Exercise Type (Therapeutic Exercise)  AROM (active range of motion);AAROM (active assistive range of motion)  -CW      Position (Therapeutic Exercise)  supine  -CW      Sets/Reps (Therapeutic Exercise)  10  -CW      Recorded by [CW] Andi Sorensen, PTA 05/09/19 1334      Row Name                Residual Limb Assessment 05/06/19 0900 other (see comments)    Residual Limb Assessment - Properties Group Date first assessed: 05/06/19 [LL] Time first assessed: 0900 [LL] Amputation Date: -- [LL], unknown  Location: other (see comments) [LL], Left great toe, Right small toes (4 &5)  Recorded by:  [LL] Ling Aleman RN 05/07/19 1218    Row Name 05/09/19 1300             Outcome Summary/Treatment Plan (PT)    Anticipated Discharge Disposition (PT)  skilled nursing facility  -CW      Recorded by [CW] Andi Sorensen, PTA 05/09/19 1334        User Key  (r) = Recorded By, (t) = Taken By, (c) = Cosigned By    Initials Name Effective Dates Discipline    CW Andi Sorensen, PTA 03/07/18 -  PT    LL Ling Aleman, RN 07/13/18 -  Nurse                   Physical Therapy  Education     Title: PT OT SLP Therapies (In Progress)     Topic: Physical Therapy (Done)     Point: Mobility training (Done)     Learning Progress Summary           Patient Acceptance, E,TB, VU,DU by  at 5/9/2019  1:34 PM    Acceptance, E,TB, VU,DU by  at 5/8/2019 10:50 AM    Acceptance, E,D, VU,DU by  at 5/7/2019 11:36 AM    Acceptance, E, NR by CA at 5/6/2019 10:55 AM                   Point: Home exercise program (Done)     Learning Progress Summary           Patient Acceptance, E,TB, VU,DU by  at 5/9/2019  1:34 PM    Acceptance, E,TB, VU,DU by  at 5/8/2019 10:50 AM    Acceptance, E,D, VU,DU by  at 5/7/2019 11:36 AM    Acceptance, E, NR by CA at 5/6/2019 10:55 AM                   Point: Body mechanics (Done)     Learning Progress Summary           Patient Acceptance, E,TB, VU,DU by  at 5/9/2019  1:34 PM    Acceptance, E,TB, VU,DU by  at 5/8/2019 10:50 AM    Acceptance, E,D, VU,DU by  at 5/7/2019 11:36 AM    Acceptance, E, NR by CA at 5/6/2019 10:55 AM                   Point: Precautions (Done)     Learning Progress Summary           Patient Acceptance, E,TB, VU,DU by  at 5/9/2019  1:34 PM    Acceptance, E,TB, VU,DU by  at 5/8/2019 10:50 AM    Acceptance, E,D, VU,DU by  at 5/7/2019 11:36 AM    Acceptance, E, NR by CA at 5/6/2019 10:55 AM                               User Key     Initials Effective Dates Name Provider Type Discipline     03/07/18 -  Andi Sorensen, PTA Physical Therapy Assistant PT     08/19/18 -  Henrietta Barker PTA Physical Therapy Assistant PT    CA 06/13/18 -  Ayanna Mar PT Physical Therapist PT                PT Recommendation and Plan  Anticipated Discharge Disposition (PT): skilled nursing facility  Therapy Frequency (PT Clinical Impression): daily  Outcome Summary/Treatment Plan (PT)  Anticipated Discharge Disposition (PT): skilled nursing facility  Plan of Care Reviewed With: patient  Progress: improving  Outcome Summary: Pt increasing with  activity tolerance and strength in the B LE to improve with bed mobility and transfer safety with NWB L LE  Outcome Measures     Row Name 05/09/19 1300 05/08/19 1000 05/07/19 1100       How much help from another person do you currently need...    Turning from your back to your side while in flat bed without using bedrails?  3  -CW  3  -CW  2  -EH    Moving from lying on back to sitting on the side of a flat bed without bedrails?  3  -CW  3  -CW  2  -EH    Moving to and from a bed to a chair (including a wheelchair)?  1  -CW  1  -CW  1  -EH    Standing up from a chair using your arms (e.g., wheelchair, bedside chair)?  2  -CW  2  -CW  1  -EH    Climbing 3-5 steps with a railing?  1  -CW  1  -CW  1  -EH    To walk in hospital room?  1  -CW  1  -CW  1  -EH    AM-PAC 6 Clicks Score  11  -CW  11  -CW  8  -EH       Functional Assessment    Outcome Measure Options  AM-PAC 6 Clicks Basic Mobility (PT)  -CW  AM-PAC 6 Clicks Basic Mobility (PT)  -CW  --      User Key  (r) = Recorded By, (t) = Taken By, (c) = Cosigned By    Initials Name Provider Type    CW Andi Sorensen PTA Physical Therapy Assistant     Henrietta Barker PTA Physical Therapy Assistant         Time Calculation:   PT Charges     Row Name 05/09/19 1335             Time Calculation    Start Time  1321  -CW      Stop Time  1335  -CW      Time Calculation (min)  14 min  -CW      PT Received On  05/09/19  -CW      PT - Next Appointment  05/10/19  -CW        User Key  (r) = Recorded By, (t) = Taken By, (c) = Cosigned By    Initials Name Provider Type    CW Andi Sorensen PTA Physical Therapy Assistant        Therapy Charges for Today     Code Description Service Date Service Provider Modifiers Qty    01540476027 HC PT THER PROC EA 15 MIN 5/8/2019 Andi Sorensen PTA GP 2    88133761452 HC PT THER SUPP EA 15 MIN 5/8/2019 Andi Sorensen, GORDO GP 2    81151814626 HC PT THER PROC EA 15 MIN 5/9/2019 Andi Sorensen PTA GP 1          PT  G-Codes  Outcome Measure Options: AM-PAC 6 Clicks Basic Mobility (PT)  AM-PAC 6 Clicks Score: 11    Andi Sorensen, PTA  5/9/2019

## 2019-05-09 NOTE — PLAN OF CARE
Problem: Patient Care Overview  Goal: Plan of Care Review  Outcome: Ongoing (interventions implemented as appropriate)   05/09/19 1788   Coping/Psychosocial   Plan of Care Reviewed With patient   Plan of Care Review   Progress improving   OTHER   Outcome Summary Pt increasing with activity tolerance and strength in the B LE to improve with bed mobility and transfer safety with NWB L LE

## 2019-05-09 NOTE — PROGRESS NOTES
[facility]  Cardiology Progress Note    Patient Identification:  Name: Rock Maciel Jr.  Age: 73 y.o.  Sex: male  :  1945  MRN: [id number]                 Follow Up / Chief Complaint: cardiac clearance    Interval History: CPAP ordered for inpatient status as his own is unavailable.  Patient remains bradycardic, BP stable and better controlled today. Echo as dictated below.      Subjective: Currently without chest pain and shortness of breath      Objective:    Past Medical History:  Past Medical History:   Diagnosis Date   • Allergic rhinitis    • Bronchitis    • Coronary artery disease    • Diabetes mellitus (CMS/[facility])    • DM (diabetes mellitus) (CMS/[facility])    • Encounter for annual health examination 2014    Annual Health Assessment   • Gout    • Hiatal hernia    • History of MRSA infection     RIGHT FOOT    • Hyperlipidemia    • Hypertension    • Murmur    • Pneumothorax on right    • Sleep apnea     pt wears CPAP at night   • Wellness examination 2015    Annual Wellness Visit     Past Surgical History:  Past Surgical History:   Procedure Laterality Date   • ARTERY SURGERY Bilateral     carotid   • CARDIAC CATHETERIZATION N/A 2018    Procedure: Left Heart Cath;  Surgeon: Jo Toney MD;  Location: [facility] INVASIVE LOCATION;  Service: Cardiovascular   • CARDIAC CATHETERIZATION N/A 2018    Procedure: Coronary angiography;  Surgeon: Jo Toney MD;  Location: [facility] INVASIVE LOCATION;  Service: Cardiovascular   • CAROTID ENDARTERECTOMY Bilateral    • COLONOSCOPY     • CORONARY ARTERY BYPASS GRAFT WITH AORTIC VALVE REPAIR/REPLACEMENT N/A 2018    Procedure: INTRAOPERATIVE ALEX, MIDLINE STERNOTOMY, CORONARY ARTERY BYPASS GRAFTING X 3 USING ENDOSCOPICALLY HARVESTED LEFT GREATER SAPHENOUS VEIN,  AORTIC VALVE REPLACEMENT USING 25MM LOPEZ II ULTRA PORCINE VALVE, PRP;  Surgeon: Phong Posey MD;  Location: [facility] OR;   Service: Cardiothoracic   • FOOT SURGERY Right 2010    5th digit removal   • FOOT SURGERY Left 2011    1 digit removed   • THORACENTESIS Right 11/21/2016   • THORACOSCOPY Right 5/8/2017    Procedure: BRONCHOSCOPY, RIGHT VAT,  TOTAL DECORTICATION RIGHT LUNG, PLEURAL BX, PLACEMENT SUBPLEURAL PAIN CAATHETERS X2;  Surgeon: Donald Orlando III, MD;  Location: [facility] OR;  Service:    • TONSILECTOMY, ADENOIDECTOMY, BILATERAL MYRINGOTOMY AND TUBES          Social History:   Social History     Tobacco Use   • Smoking status: Never Smoker   • Smokeless tobacco: Never Used   Substance Use Topics   • Alcohol use: Yes     Comment: either one glass wine or one glass liquor per  day      Family History:  Family History   Problem Relation Age of Onset   • Hypertension Father           Allergies:  Allergies   Allergen Reactions   • Ceclor [Cefaclor] Rash     Scheduled Meds:    amLODIPine 5 mg Daily   atenolol 50 mg Daily   atorvastatin 40 mg Nightly   insulin lispro 0-7 Units 4x Daily With Meals & Nightly   miconazole nitrate  Q12H   sodium chloride 3 mL Q12H       I have reviewed the patient's recent medical history and current medications.     INTAKE AND OUTPUT:    Intake/Output Summary (Last 24 hours) at 5/9/2019 1550  Last data filed at 5/9/2019 1500  Gross per 24 hour   Intake --   Output 1160 ml   Net -1160 ml       Review of Systems: unchanged  GI: Denies abdominal pain and n/v/d  Cardiac: Denies chest pain and palpitations  Pulmonary: Denies shortness of breath and cough    Constitutional:  Temp:  [97.1 °F (36.2 °C)-97.7 °F (36.5 °C)] 97.7 °F (36.5 °C)  Heart Rate:  [46-56] 46  Resp:  [16-18] 18  BP: (127-151)/(52-64) 129/60    Physical Exam:  General:  Appears in no acute distress; resting in bed   Eyes: PERTL,  HEENT:  No JVD. Thyroid not visibly enlarged. No mucosal pallor or cyanosis  Respiratory: Respirations regular and unlabored at rest. BBS with good air entry anteriorly and laterally. No crackles, rubs or  wheezes auscultated  Cardiovascular: S1S2 Regular rate and rhythm. No murmur, rub or gallop. No carotid bruits. DP/PT pulses 1+. Trace-1+ pretibial pitting edema-improved  Gastrointestinal: Abdomen soft, round, non tender. Bowel sounds present. No ascites  Musculoskeletal: MARSHALL x4. No abnormal movements  Extremities: No digital clubbing or cyanosis  Skin: Color pink. Skin warm and dry to touch. No rashes    Neuro: AAO x3 CN II-XII grossly intact  Psych: Mood and affect normal, pleasant and cooperative          Cardiographics  Telemetry:     Unavailable     EC/7 SR rate 50s-talisha, RBBB known-similar to prior tracing        2018 SR rate 70s RBBB            Echocardiogram:       Interpretation Summary     · Left ventricular systolic function is normal. Calculated EF = 64.0%. Estimated EF was in agreement with the calculated EF. Estimated EF = 64%. Normal left ventricular cavity size noted. All left ventricular wall segments contract normally. Left ventricular wall thickness is consistent with mild concentric hypertrophy. Left ventricular diastolic dysfunction is noted (grade II w/high LAP) consistent with pseudonormalization.  · Left atrial volume is borderline increased.  · There is a prosthetic aortic valve. No aortic valve stenosis is present. There is a bioprosthetic valve present. Hemodynamically significant regurgitation is present. There is moderate prosthetic aortic valve regurgitation. Cannot tell from the study if this is paravalvular transvalvular in origin..  · Severe MAC is present. There is a nodular appearance to the mitral valve leaflets. Cannot rule out nodular sclerosis versus vegetative lesions.. There is severe bileaflet mitral valve thickening present. Mild mitral valve regurgitation is present.            2018  Interpretation Summary     · Limited echo performed  · Suboptimal scan  · LV function appears to be normal  · No pericardial effusion         2018  Interpretation Summary      · Left atrial cavity size is moderately dilated.  · Mild mitral valve regurgitation is present  · Mild tricuspid valve regurgitation is present.  · Calculated EF = 68%.  · There is no evidence of pericardial effusion.  · There is calcification of the aortic valve.  · Mild to moderate aortic valve stenosis is present.       Stress test  9/2018  Interpretation Summary        · Equivocal ECG evidence of myocardial ischemia.Negative clinical evidence of myocardial ischemia. Findings consistent with an equivocal ECG stress test. Submaximal Stress Test.  · . Asymptomatic for chest pain       Cardiac catheterization  7/2018  Conclusion        · Successful right and left coronary angiogram and LV pressures  · Normal left main  · Minimum diffuse left anterior descending irregularity with midportion 40-50% stenosis  · Circumflex artery has OM ostial 70% distal circumflex 60% stenosis  · Right coronary artery dominant with mid and distal portion 70% stenosis  · Severe aortic stenosis with gradient 60 mmHg               Lab Review   Results from last 7 days   Lab Units 05/08/19  0554 05/07/19  1744 05/07/19  0639 05/06/19  0721 05/06/19  0000   CK TOTAL U/L  --   --  480* 517* 510*   TROPONIN T ng/mL <0.010 <0.010  --   --   --      Results from last 7 days   Lab Units 05/08/19  0554   MAGNESIUM mg/dL 2.6*     Results from last 7 days   Lab Units 05/07/19  0639   SODIUM mmol/L 137   POTASSIUM mmol/L 3.7   BUN mg/dL 39*   CREATININE mg/dL 1.27   CALCIUM mg/dL 8.6       Results from last 7 days   Lab Units 05/07/19  0639 05/06/19  0721 05/06/19  0000   WBC 10*3/mm3 6.15 8.17 13.61*   HEMOGLOBIN g/dL 10.4* 11.0* 12.4*   HEMATOCRIT % 33.7* 34.7* 39.8   PLATELETS 10*3/mm3 174 197 221               Assessment/plan    1.  Cardiac clearance-Echo 5/8 revealed LV function normal, EF 64%.  Mild concentric LVH with grade 2 LV diastolic dysfunction, borderline increased left atrial volume, no AV stenosis, bioprosthetic aortic valve  "present, moderate AVR, severe MAC. Will discuss echo with MD.     2.  CAD-Still without chest pain    3.  Patient currently on statin therapy.  Continue statin therapy.    4.  Hypertension- Controlled.  Continue current medication regimen.     5.  Left knee pain- Patient awaiting surgery, scheduled Tuesday.      Labs/tests ordered: cbc, bmp      )5/9/2019  ZEINA Wilson/Transcription:   \"Dictated utilizing Dragon dictation\".   "

## 2019-05-09 NOTE — CONSULTS
Pulmonary Consultation     Patient Name: Rock Maciel Jr.  Age/Sex: 73 y.o. male  : 1945  MRN: [id number]    Date of Admission: 2019  Date of Encounter Visit: 19  Encounter Provider: Neelam Matos MD  Referring Provider: Donald Pham*  Place of Service: [facility]  Patient Care Team:  Karen Red MD as PCP - General (Family Medicine)  Azam Banegas Jr., MD as Consulting Physician (Cardiology)      Subjective:     Consulted for: SHASTA management and preoperative evaluation    Chief Complaint: Fall was ruptured on the left Achilles tendon    History of Present Illness:  Rock Maciel Jr. is a 73 y.o. male with history of severe SHASTA with sleep related hypoxemia that was admitted after a fall with left quadriceps tendon rupture.  He was seen already by cardiology, the surgery is supposed to be done on the  after the effect of his antiplatelet agent has washed out.  Patient has previous video-assisted thoracoscopic surgery foot what he described as decortication procedure for trapped lung with no other pulmonary issues like asthma or COPD or pneumonia, he is a non-smoker never smoked with no history of COPD.  Patient was cleared for the surgery from the cardiac standpoint, he did not bring his home CPAP machine with him, he does not recall the severity of the sleep apnea or the type of machine that he has and we will call for assessment and we will assess also for his preoperative risk factor given the sleep apnea.  His comorbidities include chronic kidney disease stage III, diabetes mellitus type 2,  The sleep study showed evidence of severe obstructive sleep apnea with AHI 60 with hypoxemic desaturation down to 66% which was severe.  Patient reported that he feels better on the CPAP, he sleeps deeper with less episode of gasping, less nocturia, and no specific discomfort from the full facemask that he is using or the CPAP pressure or any difficulty exhaling and he is  very consistent and very compliant with the CPAP with last compliance download showing  near 100% adherence  Current treatment is CPAP machine that he is very compliant with with an average nightly use of 7 hours and 35 minutes with a residual AHI of 1.3 and an median CPAP pressure of 9.3, current setting is auto 5-15  Auto CPAP download:    No results found for: FEV1, FVC, WOV2BEM, TLC, DLCO    Review of Systems:   Review of Systems       Past Medical History:  Past Medical History:   Diagnosis Date   • Allergic rhinitis    • Bronchitis    • Coronary artery disease    • Diabetes mellitus (CMS/[facility]) 2001   • DM (diabetes mellitus) (CMS/[facility])    • Encounter for annual health examination 05/06/2014    Annual Health Assessment   • Gout    • Hiatal hernia    • History of MRSA infection     RIGHT FOOT 2010   • Hyperlipidemia    • Hypertension    • Murmur    • Pneumothorax on right    • Sleep apnea     pt wears CPAP at night   • Wellness examination 06/24/2015    Annual Wellness Visit       Past Surgical History:   Procedure Laterality Date   • ARTERY SURGERY Bilateral     carotid   • CARDIAC CATHETERIZATION N/A 7/20/2018    Procedure: Left Heart Cath;  Surgeon: Jo Toney MD;  Location: [facility] INVASIVE LOCATION;  Service: Cardiovascular   • CARDIAC CATHETERIZATION N/A 7/20/2018    Procedure: Coronary angiography;  Surgeon: Jo Toney MD;  Location: [facility] INVASIVE LOCATION;  Service: Cardiovascular   • CAROTID ENDARTERECTOMY Bilateral    • COLONOSCOPY  2008   • CORONARY ARTERY BYPASS GRAFT WITH AORTIC VALVE REPAIR/REPLACEMENT N/A 7/23/2018    Procedure: INTRAOPERATIVE ALEX, MIDLINE STERNOTOMY, CORONARY ARTERY BYPASS GRAFTING X 3 USING ENDOSCOPICALLY HARVESTED LEFT GREATER SAPHENOUS VEIN,  AORTIC VALVE REPLACEMENT USING 25MM LOPEZ II ULTRA PORCINE VALVE, PRP;  Surgeon: Phong Posey MD;  Location: [facility] OR;  Service: Cardiothoracic   • FOOT SURGERY Right 2010    5th digit removal    • FOOT SURGERY Left 2011    1 digit removed   • THORACENTESIS Right 11/21/2016   • THORACOSCOPY Right 5/8/2017    Procedure: BRONCHOSCOPY, RIGHT VAT,  TOTAL DECORTICATION RIGHT LUNG, PLEURAL BX, PLACEMENT SUBPLEURAL PAIN CAATHETERS X2;  Surgeon: Donald Orlando III, MD;  Location: [facility];  Service:    • TONSILECTOMY, ADENOIDECTOMY, BILATERAL MYRINGOTOMY AND TUBES         Home Medications:   Medications Prior to Admission   Medication Sig Dispense Refill Last Dose   • amLODIPine (NORVASC) 5 MG tablet Take 1 tablet by mouth Daily. 90 tablet 1    • atenolol (TENORMIN) 50 MG tablet Take 1 tablet by mouth Daily. (Patient taking differently: Take 50 mg by mouth 2 (Two) Times a Day.) 180 tablet 1    • atorvastatin (LIPITOR) 40 MG tablet Take 1 tablet by mouth Every Night. 90 tablet 1    • Cholecalciferol (VITAMIN D3 PO) Take 2,000 Units by mouth Daily. PT HOLDING FOR SURGERY    Patient Taking Differently   • clopidogrel (PLAVIX) 75 MG tablet Take 1 tablet by mouth Daily. 90 tablet 1    • furosemide (LASIX) 40 MG tablet Take 1 tablet by mouth Daily. 90 tablet 1    • glipiZIDE (GLUCOTROL) 5 MG tablet 1 in am and 1 at 5 pm 180 tablet 1    • glucose blood (ACCU-CHEK BOB PLUS) test strip Use as instructed 200 each 12    • Lancets (ACCU-CHEK SOFT TOUCH) lancets ACCU-CHEK SOFTCLIX LANCETS 200 each 1    • metFORMIN ER (GLUCOPHAGE-XR) 750 MG 24 hr tablet Take 1 tablet by mouth 3 (Three) Times a Day With Meals. (Patient taking differently: Take 750 mg by mouth 2 (Two) Times a Day. Patient unsure if he takes it bid or tid but thinks it is bid) 270 tablet 1    • vitamin C (ASCORBIC ACID) 250 MG tablet Take 250 mg by mouth Daily.   Taking   • Omega-3 Fatty Acids (FISH OIL) 1000 MG capsule capsule Take 1,000 mg by mouth Daily With Breakfast. PT HOLDING FOR SURGERY   Taking       Inpatient Medications:  Scheduled Meds:  amLODIPine 5 mg Oral Daily   atenolol 50 mg Oral Daily   atorvastatin 40 mg Oral Nightly   insulin lispro  0-7 Units Subcutaneous 4x Daily With Meals & Nightly   miconazole nitrate  Topical Q12H   sodium chloride 3 mL Intravenous Q12H     Continuous Infusions:   PRN Meds:.•  acetaminophen  •  dextrose  •  dextrose  •  glucagon (human recombinant)  •  HYDROcodone-acetaminophen  •  melatonin  •  ondansetron **OR** ondansetron  •  sodium chloride  •  [COMPLETED] Insert peripheral IV **AND** sodium chloride  •  zolpidem    Allergies:  Allergies   Allergen Reactions   • Ceclor [Cefaclor] Rash       Past Social History:  Social History     Socioeconomic History   • Marital status:      Spouse name: Not on file   • Number of children: 1   • Years of education: Not on file   • Highest education level: Not on file   Occupational History   • Occupation: retired from [employer]   Tobacco Use   • Smoking status: Never Smoker   • Smokeless tobacco: Never Used   Substance and Sexual Activity   • Alcohol use: Yes     Comment: either one glass wine or one glass liquor per  day   • Drug use: No   • Sexual activity: Defer       Past Family History:  Family History   Problem Relation Age of Onset   • Hypertension Father            Objective:   Temp:  [97.1 °F (36.2 °C)-97.5 °F (36.4 °C)] 97.3 °F (36.3 °C)  Heart Rate:  [50-56] 50  Resp:  [16-18] 18  BP: (127-165)/(52-66) 148/52  SpO2:  [95 %-98 %] 95 %  on  Flow (L/min):  [2] 2 Device (Oxygen Therapy): room air     Intake/Output Summary (Last 24 hours) at 5/9/2019 1109  Last data filed at 5/9/2019 0637  Gross per 24 hour   Intake --   Output 1260 ml   Net -1260 ml     Body mass index is 41.69 kg/m².      05/06/19  0000 05/06/19  0300 05/08/19  1756   Weight: (!) 141 kg (311 lb) (!) 144 kg (316 lb 6 oz) (!) 143 kg (316 lb)     Weight change:     Physical Exam:   Physical Exam   General:    No acute distress, alert and oriented x4, pleasant                   Head:    Normocephalic, atraumatic.   Eyes:          Conjunctivae and sclerae normal, no icterus, PERRLA   Throat:   No oral  lesions, no thrush, oral mucosa moist, double chin, Mallampati 4 airway, redundant membranous soft palate and swollen uvula.    Neck:   Supple, trachea midline.  No JVD   Lungs:     Normal chest on inspection, CTAB, no wheezes. No rhonchi. No crackles. Respirations regular, even and unlabored.      Heart:    Regular rhythm and normal rate.  No murmurs, gallops, or rubs noted.   Abdomen:     Soft, non-tender, non-distended, positive bowel sounds.    Extremities:   No clubbing, cyanosis, trace lower extremity pitting edema.  The left lower extremity has a brace on it   Pulses:   Pulses palpable and equal bilaterally.    Skin:   No bleeding or rash.   Neuro:   Non-focal.  Moves all extremities well (did not ask to move the left lower extremity).    Psychiatric:   Normal mood and affect.     Lab Review:   Results from last 7 days   Lab Units 05/07/19  0639 05/06/19  0721 05/06/19  0000   SODIUM mmol/L 137 142 142   POTASSIUM mmol/L 3.7 4.0 4.3   CHLORIDE mmol/L 105 106 105   CO2 mmol/L 24.2 24.5 22.1   BUN mg/dL 39* 45* 49*   CREATININE mg/dL 1.27 1.38* 1.53*   GLUCOSE mg/dL 130* 134* 156*   CALCIUM mg/dL 8.6 8.8 9.2   AST (SGOT) U/L  --   --  23   ALT (SGPT) U/L  --   --  14   ALBUMIN g/dL  --   --  4.10     Results from last 7 days   Lab Units 05/08/19  0554 05/07/19  1744 05/07/19  0639 05/06/19  0721 05/06/19  0000   CK TOTAL U/L  --   --  480* 517* 510*   TROPONIN T ng/mL <0.010 <0.010  --   --   --      Results from last 7 days   Lab Units 05/07/19  0639 05/06/19  0721 05/06/19  0000   WBC 10*3/mm3 6.15 8.17 13.61*   HEMOGLOBIN g/dL 10.4* 11.0* 12.4*   HEMATOCRIT % 33.7* 34.7* 39.8   PLATELETS 10*3/mm3 174 197 221   MCV fL 90.6 89.0 89.4   MCH pg 28.0 28.2 27.9   MCHC g/dL 30.9* 31.7 31.2*   RDW % 14.7 14.4 14.3   RDW-SD fl 48.6 46.2 45.9   MPV fL 10.6 10.9 10.3   NEUTROPHIL % %  --   --  90.1*   LYMPHOCYTE % %  --   --  4.9*   MONOCYTES % %  --   --  4.4*   EOSINOPHIL % %  --   --  0.0*   BASOPHIL % %  --   --   0.2   IMM GRAN % %  --   --  0.4   NEUTROS ABS 10*3/mm3  --   --  12.26*   LYMPHS ABS 10*3/mm3  --   --  0.67*   MONOS ABS 10*3/mm3  --   --  0.60   EOS ABS 10*3/mm3  --   --  0.00   BASOS ABS 10*3/mm3  --   --  0.03   IMMATURE GRANS (ABS) 10*3/mm3  --   --  0.05   NRBC /100 WBC  --   --  0.0         Results from last 7 days   Lab Units 05/08/19  0554   MAGNESIUM mg/dL 2.6*     Results from last 7 days   Lab Units 05/08/19  0554   CHOLESTEROL mg/dL 115   TRIGLYCERIDES mg/dL 77   HDL CHOL mg/dL 39*                                                 Imaging:  Imaging Results (most recent)     Procedure Component Value Units Date/Time    CT Lower Extremity Left Without Contrast [[id number]] Collected:  05/06/19 1349     Updated:  05/06/19 1409    Narrative:       CT LEFT KNEE WITHOUT CONTRAST     HISTORY: Fall on left knee. Suspected fracture.     TECHNIQUE: CT includes axial imaging through the left knee and this data  was reconstructed in coronal and sagittal planes.     COMPARISON: Left femur x-rays 05/06/2019.     FINDINGS: Above the patella there are foci of ossification that are  suspected to represent foci of cortical avulsion or avulsed spurs at the  quadriceps tendon insertion. This also needs correlation with clinical  data. The majority of the quadriceps tendon appears to insert onto these  fragments. There is also suprapatellar effusion and there is  extracapsular extension of fluid in this region with localized soft  tissue thickening. Additionally, the patella is somewhat low-lying and  the patellar tendon exhibits undulating configuration supporting  extensor mechanism failure.     Osteoarthritis is present greatest at the medial compartment. There is  osteochondral lesion measuring 19 x 7 mm at the weightbearing surface of  the medial femoral condyle and there has potentially been previous  surgery to this region.. Overlying cortical thinning and irregularity is  present. There is joint joint space  narrowing with marginal spur  formation. Widening is present at the lateral compartment. There is  patellofemoral arthritis. Atherosclerotic calcification is present  involving the popliteal artery.       Impression:       1. There appears to be extensor mechanism failure with quadriceps  insertional avulsion at the upper pole of the patella The quadriceps  tendon inserts on foci of displaced ossification suspected to represent  displaced avulsed cortical fragments or spurs,  Knee joint effusion with  surrounding soft tissue edema and extracapsular extension of fluid into  the anterior knee subcutaneous fat. Patellar tendon exhibits undulating  configuration and the patella is somewhat low-lying.   2. Osteoarthritis with medial compartment joint space narrowing. Chronic  osteochondral lesion weightbearing surface medial femoral condyle.     Radiation dose reduction techniques were utilized, including automated  exposure control and exposure modulation based on body size.     This report was finalized on 5/6/2019 2:06 PM by Dr. Freddy Guzman M.D.       XR Femur 2 View Left [[id number]] Collected:  05/06/19 0036     Updated:  05/06/19 0043    Narrative:       6 VIEWS LEFT FEMUR     HISTORY: Left femur pain after a fall about 6 hours ago.     COMPARISON: None available.     FINDINGS:  The exam is degraded by patient positioning. No obvious acute fracture  or subluxation of the left femur is seen. There is some fullness within  the suprapatellar pouch which may reflect an effusion. No aggressive  osseous abnormalities are seen. Dense atherosclerotic calcification of  the femoral vasculature is noted. There may also be some focal soft  tissue swelling within the suprapatellar region.       Impression:       No definite acute fracture or subluxation identified, although I think  the patient may have a small suprapatellar effusion, as well as some  overlying soft tissue swelling within the suprapatellar region.     This  report was finalized on 5/6/2019 12:39 AM by Dr. Inocencia Cruz M.D.             I personally viewed and interpreted the patient's imaging studies.    Assessment:   Severe SHASTA with AHI of 60  Severe sleep and hypoxemia with desaturation down to 66% on room air  History of trapped lung status post decortication with no other respiratory issues  Left quadriceps tendon ruptures for surgery  Preoperative pulmonary clearance, no contraindication from the pulmonary standpoint  Coronary artery disease status post CABG  Type 2 diabetes mellitus  Obesity  Chronic kidney disease stage III      Plan:     Will order the patient an auto CPAP, given his pressure setting I think he would have more optimal control on auto CPAP 9-15.  We will check an overnight oximetry on the above to make sure that his severe hypoxemia is controlled  Patient has no contra indication for the surgery, he has no underlying respiratory disease except the video-assisted thoracoscopic surgery for trapped lung and open heart which were not associated with any respiratory complication otherwise.  His sleep apnea is always a concern, especially when that severe, it is expected to be worse with worsening hypoxemia while on pain medication and after anesthetics.  He would be placed on oxygen definitely in the postoperative.  And we can always start CPAP or even BiPAP at a higher pressure in the oxygen was not enough in the immediate postoperative phase.  Patient usually uses a full facemask at home so should be able to tolerate the hospital mask without a problem.    We will start the CPAP will adjust the pressure and will check an overnight oximetry and will follow-up postoperatively.  Discussed with the patient details        Thank you for allowing me to participate in the care of Rock Macile Jr.. Feel free to contact me directly with any further questions or concerns.    Neelam Matos MD  [facility]   05/09/19  11:09 AM    Dictated  utilizing Dragon dictation

## 2019-05-09 NOTE — PLAN OF CARE
Problem: Patient Care Overview  Goal: Plan of Care Review  Outcome: Ongoing (interventions implemented as appropriate)   05/09/19 7633   Coping/Psychosocial   Plan of Care Reviewed With patient   Plan of Care Review   Progress no change   OTHER   Outcome Summary Pt has had no c/o pain or nausea this shift, he is really concerned about them sending him to rehab instead of keeping him here. pt worked with PT today. VSS, pt needs to use a cpap or bipap tonight, also needs to have a overnight oximetry done tonight.  will cont to monitor     Goal: Individualization and Mutuality  Outcome: Ongoing (interventions implemented as appropriate)    Goal: Discharge Needs Assessment  Outcome: Ongoing (interventions implemented as appropriate)    Goal: Interprofessional Rounds/Family Conf  Outcome: Ongoing (interventions implemented as appropriate)      Problem: Fall Risk (Adult)  Goal: Absence of Fall  Outcome: Ongoing (interventions implemented as appropriate)      Problem: Pain, Acute (Adult)  Goal: Acceptable Pain Control/Comfort Level  Outcome: Ongoing (interventions implemented as appropriate)      Problem: Skin Injury Risk (Adult)  Goal: Skin Health and Integrity  Outcome: Ongoing (interventions implemented as appropriate)

## 2019-05-09 NOTE — PLAN OF CARE
Problem: Patient Care Overview  Goal: Plan of Care Review  Outcome: Ongoing (interventions implemented as appropriate)   05/09/19 0522   Plan of Care Review   Progress improving   OTHER   Outcome Summary VSS except HR in 50's, , only c/o pain when moving LLE but requested no meds, brace on, ointment to excoriated rodriguez area     Goal: Individualization and Mutuality  Outcome: Ongoing (interventions implemented as appropriate)    Goal: Discharge Needs Assessment  Outcome: Ongoing (interventions implemented as appropriate)    Goal: Interprofessional Rounds/Family Conf  Outcome: Ongoing (interventions implemented as appropriate)      Problem: Fall Risk (Adult)  Goal: Absence of Fall  Outcome: Ongoing (interventions implemented as appropriate)      Problem: Pain, Acute (Adult)  Goal: Acceptable Pain Control/Comfort Level  Outcome: Ongoing (interventions implemented as appropriate)      Problem: Skin Injury Risk (Adult)  Goal: Skin Health and Integrity  Outcome: Ongoing (interventions implemented as appropriate)

## 2019-05-10 LAB
GLUCOSE BLDC GLUCOMTR-MCNC: 160 MG/DL (ref 70–130)
GLUCOSE BLDC GLUCOMTR-MCNC: 191 MG/DL (ref 70–130)
GLUCOSE BLDC GLUCOMTR-MCNC: 218 MG/DL (ref 70–130)
GLUCOSE BLDC GLUCOMTR-MCNC: 242 MG/DL (ref 70–130)

## 2019-05-10 PROCEDURE — 82962 GLUCOSE BLOOD TEST: CPT

## 2019-05-10 PROCEDURE — 97110 THERAPEUTIC EXERCISES: CPT

## 2019-05-10 PROCEDURE — 99231 SBSQ HOSP IP/OBS SF/LOW 25: CPT | Performed by: NURSE PRACTITIONER

## 2019-05-10 PROCEDURE — 63710000001 INSULIN LISPRO (HUMAN) PER 5 UNITS: Performed by: NURSE PRACTITIONER

## 2019-05-10 PROCEDURE — 25010000002 ENOXAPARIN PER 10 MG: Performed by: INTERNAL MEDICINE

## 2019-05-10 RX ORDER — AMLODIPINE BESYLATE 10 MG/1
10 TABLET ORAL DAILY
Status: DISCONTINUED | OUTPATIENT
Start: 2019-05-11 | End: 2019-05-19 | Stop reason: HOSPADM

## 2019-05-10 RX ORDER — ATENOLOL 50 MG/1
25 TABLET ORAL DAILY
Status: DISCONTINUED | OUTPATIENT
Start: 2019-05-11 | End: 2019-05-19 | Stop reason: HOSPADM

## 2019-05-10 RX ORDER — SENNA AND DOCUSATE SODIUM 50; 8.6 MG/1; MG/1
2 TABLET, FILM COATED ORAL 2 TIMES DAILY
Status: DISCONTINUED | OUTPATIENT
Start: 2019-05-10 | End: 2019-05-19 | Stop reason: HOSPADM

## 2019-05-10 RX ADMIN — INSULIN LISPRO 3 UNITS: 100 INJECTION, SOLUTION INTRAVENOUS; SUBCUTANEOUS at 12:04

## 2019-05-10 RX ADMIN — ZOLPIDEM TARTRATE 5 MG: 5 TABLET ORAL at 23:22

## 2019-05-10 RX ADMIN — SENNOSIDES AND DOCUSATE SODIUM 2 TABLET: 8.6; 5 TABLET ORAL at 21:36

## 2019-05-10 RX ADMIN — SENNOSIDES AND DOCUSATE SODIUM 2 TABLET: 8.6; 5 TABLET ORAL at 11:19

## 2019-05-10 RX ADMIN — MICONAZOLE NITRATE: 2 OINTMENT TOPICAL at 21:36

## 2019-05-10 RX ADMIN — SODIUM CHLORIDE, PRESERVATIVE FREE 3 ML: 5 INJECTION INTRAVENOUS at 21:30

## 2019-05-10 RX ADMIN — INSULIN LISPRO 2 UNITS: 100 INJECTION, SOLUTION INTRAVENOUS; SUBCUTANEOUS at 17:19

## 2019-05-10 RX ADMIN — ENOXAPARIN SODIUM 40 MG: 40 INJECTION SUBCUTANEOUS at 17:18

## 2019-05-10 RX ADMIN — ATENOLOL 50 MG: 50 TABLET ORAL at 08:04

## 2019-05-10 RX ADMIN — AMLODIPINE BESYLATE 5 MG: 5 TABLET ORAL at 08:04

## 2019-05-10 RX ADMIN — INSULIN LISPRO 3 UNITS: 100 INJECTION, SOLUTION INTRAVENOUS; SUBCUTANEOUS at 21:36

## 2019-05-10 RX ADMIN — ATORVASTATIN CALCIUM 40 MG: 20 TABLET, FILM COATED ORAL at 21:29

## 2019-05-10 RX ADMIN — SODIUM CHLORIDE, PRESERVATIVE FREE 3 ML: 5 INJECTION INTRAVENOUS at 08:05

## 2019-05-10 RX ADMIN — MICONAZOLE NITRATE: 2 OINTMENT TOPICAL at 10:12

## 2019-05-10 NOTE — PROGRESS NOTES
[facility]  Cardiology Progress Note    Patient Identification:  Name: Rock Maciel Jr.  Age: 73 y.o.  Sex: male  :  1945  MRN: [id number]                 Follow Up / Chief Complaint: cardiac clearance    Interval History: Patient continues to wait for surgery. Age related valve changes noted on echo-discussed with MD. Bradycardia remains, BP elevated in setting of pain.      Subjective: Denies chest pain and shortness of breath.     Objective:    Past Medical History:  Past Medical History:   Diagnosis Date   • Allergic rhinitis    • Bronchitis    • Coronary artery disease    • Diabetes mellitus (CMS/Formerly [facility])    • DM (diabetes mellitus) (CMS/Formerly [facility])    • Encounter for annual health examination 2014    Annual Health Assessment   • Gout    • Hiatal hernia    • History of MRSA infection     RIGHT FOOT    • Hyperlipidemia    • Hypertension    • Murmur    • Pneumothorax on right    • Sleep apnea     pt wears CPAP at night   • Wellness examination 2015    Annual Wellness Visit     Past Surgical History:  Past Surgical History:   Procedure Laterality Date   • ARTERY SURGERY Bilateral     carotid   • CARDIAC CATHETERIZATION N/A 2018    Procedure: Left Heart Cath;  Surgeon: Jo Toney MD;  Location: [facility] INVASIVE LOCATION;  Service: Cardiovascular   • CARDIAC CATHETERIZATION N/A 2018    Procedure: Coronary angiography;  Surgeon: Jo Toney MD;  Location: [facility] INVASIVE LOCATION;  Service: Cardiovascular   • CAROTID ENDARTERECTOMY Bilateral    • COLONOSCOPY     • CORONARY ARTERY BYPASS GRAFT WITH AORTIC VALVE REPAIR/REPLACEMENT N/A 2018    Procedure: INTRAOPERATIVE ALEX, MIDLINE STERNOTOMY, CORONARY ARTERY BYPASS GRAFTING X 3 USING ENDOSCOPICALLY HARVESTED LEFT GREATER SAPHENOUS VEIN,  AORTIC VALVE REPLACEMENT USING 25MM LOPEZ II ULTRA PORCINE VALVE, PRP;  Surgeon: Phong Posey MD;  Location: [facility] OR;  Service:  Cardiothoracic   • FOOT SURGERY Right 2010    5th digit removal   • FOOT SURGERY Left 2011    1 digit removed   • THORACENTESIS Right 11/21/2016   • THORACOSCOPY Right 5/8/2017    Procedure: BRONCHOSCOPY, RIGHT VAT,  TOTAL DECORTICATION RIGHT LUNG, PLEURAL BX, PLACEMENT SUBPLEURAL PAIN CAATHETERS X2;  Surgeon: Donald Orlando III, MD;  Location: [facility] OR;  Service:    • TONSILECTOMY, ADENOIDECTOMY, BILATERAL MYRINGOTOMY AND TUBES          Social History:   Social History     Tobacco Use   • Smoking status: Never Smoker   • Smokeless tobacco: Never Used   Substance Use Topics   • Alcohol use: Yes     Comment: either one glass wine or one glass liquor per  day      Family History:  Family History   Problem Relation Age of Onset   • Hypertension Father           Allergies:  Allergies   Allergen Reactions   • Ceclor [Cefaclor] Rash     Scheduled Meds:    amLODIPine 5 mg Daily   atenolol 50 mg Daily   atorvastatin 40 mg Nightly   enoxaparin 40 mg Q24H   insulin lispro 0-7 Units 4x Daily With Meals & Nightly   miconazole nitrate  Q12H   sennosides-docusate sodium 2 tablet BID   sodium chloride 3 mL Q12H       I have reviewed the patient's recent medical history and current medications.     INTAKE AND OUTPUT:    Intake/Output Summary (Last 24 hours) at 5/10/2019 1418  Last data filed at 5/10/2019 0900  Gross per 24 hour   Intake 120 ml   Output 1050 ml   Net -930 ml       Review of Systems:   GI: Denies abdominal pain and nausea, vomiting, and diarrhea  Cardiac: Denies chest pain and palpitations  Pulmonary: Denies shortness of breath and cough    Constitutional:  Temp:  [97.2 °F (36.2 °C)-97.9 °F (36.6 °C)] 97.2 °F (36.2 °C)  Heart Rate:  [42-54] 48  Resp:  [16-18] 18  BP: (129-163)/(56-66) 156/57    Physical Exam:  General:  Appears in no acute distress; resting comfortably in bed   Eyes: PERTL,  HEENT:  No JVD. Thyroid not visibly enlarged. No mucosal pallor or cyanosis  Respiratory: Respirations regular and  unlabored at rest. BBS with good air entry anteriorly and laterally. No crackles, rubs or wheezes auscultated  Cardiovascular: S1S2 Regular rate and rhythm. No murmur, rub or gallop. No carotid bruits. DP/PT pulses 1+. Trace-1+ pretibial pitting edema-improving  Gastrointestinal: Abdomen soft, round, non tender. Bowel sounds present. No ascites  Musculoskeletal: MARSHALL x4. No abnormal movements-amputated LE digits noted  Extremities: No digital clubbing or cyanosis  Skin: Color pink. Skin warm and dry to touch. No rashes    Neuro: AAO x3 CN II-XII grossly intact  Psych: Mood and affect normal, pleasant and cooperative          Cardiographics  Telemetry:     Unavailable     EC/7 SR rate 50s-talisha, RBBB known-similar to prior tracing        2018 SR rate 70s RBBB            Echocardiogram:       Interpretation Summary     · Left ventricular systolic function is normal. Calculated EF = 64.0%. Estimated EF was in agreement with the calculated EF. Estimated EF = 64%. Normal left ventricular cavity size noted. All left ventricular wall segments contract normally. Left ventricular wall thickness is consistent with mild concentric hypertrophy. Left ventricular diastolic dysfunction is noted (grade II w/high LAP) consistent with pseudonormalization.  · Left atrial volume is borderline increased.  · There is a prosthetic aortic valve. No aortic valve stenosis is present. There is a bioprosthetic valve present. Hemodynamically significant regurgitation is present. There is moderate prosthetic aortic valve regurgitation. Cannot tell from the study if this is paravalvular transvalvular in origin..  · Severe MAC is present. There is a nodular appearance to the mitral valve leaflets. Cannot rule out nodular sclerosis versus vegetative lesions.. There is severe bileaflet mitral valve thickening present. Mild mitral valve regurgitation is present.            2018  Interpretation Summary     · Limited echo  performed  · Suboptimal scan  · LV function appears to be normal  · No pericardial effusion         5/2018  Interpretation Summary     · Left atrial cavity size is moderately dilated.  · Mild mitral valve regurgitation is present  · Mild tricuspid valve regurgitation is present.  · Calculated EF = 68%.  · There is no evidence of pericardial effusion.  · There is calcification of the aortic valve.  · Mild to moderate aortic valve stenosis is present.       Stress test  9/2018  Interpretation Summary        · Equivocal ECG evidence of myocardial ischemia.Negative clinical evidence of myocardial ischemia. Findings consistent with an equivocal ECG stress test. Submaximal Stress Test.  · . Asymptomatic for chest pain       Cardiac catheterization  7/2018  Conclusion        · Successful right and left coronary angiogram and LV pressures  · Normal left main  · Minimum diffuse left anterior descending irregularity with midportion 40-50% stenosis  · Circumflex artery has OM ostial 70% distal circumflex 60% stenosis  · Right coronary artery dominant with mid and distal portion 70% stenosis  · Severe aortic stenosis with gradient 60 mmHg               Lab Review   Results from last 7 days   Lab Units 05/08/19  0554 05/07/19  1744 05/07/19  0639 05/06/19  0721 05/06/19  0000   CK TOTAL U/L  --   --  480* 517* 510*   TROPONIN T ng/mL <0.010 <0.010  --   --   --      Results from last 7 days   Lab Units 05/08/19  0554   MAGNESIUM mg/dL 2.6*     Results from last 7 days   Lab Units 05/07/19  0639   SODIUM mmol/L 137   POTASSIUM mmol/L 3.7   BUN mg/dL 39*   CREATININE mg/dL 1.27   CALCIUM mg/dL 8.6       Results from last 7 days   Lab Units 05/07/19  0639 05/06/19  0721 05/06/19  0000   WBC 10*3/mm3 6.15 8.17 13.61*   HEMOGLOBIN g/dL 10.4* 11.0* 12.4*   HEMATOCRIT % 33.7* 34.7* 39.8   PLATELETS 10*3/mm3 174 197 221               Assessment/plan    1.  Cardiac clearance-Discussed echo with MD, age related valvular changes noted.  "Patient clear from cardiac standpoint for surgery on Tuesday.     2.  CAD-Remains free of chest pain. Continue medical therapy.     3.  Hypertension-Elevated today. Will increase amlodipine and decrease BB as he is bradycardic.     4.  Left knee pain- Patient awaiting surgery, scheduled Tuesday.    5. Bradycardia-asymptomatic, on BB.  Will decrease BB and increase CCB for better BP control.     Stable from CV standpoint, will see PRN over the weekend.       )5/10/2019  ZEINA Wilson      EMR César/Transcription:   \"Dictated utilizing Dragon dictation\".   "

## 2019-05-10 NOTE — PROGRESS NOTES
PROGRESS NOTE  Patient Name: Rock Maciel Jr.  Age/Sex: 73 y.o. male  : 1945  MRN: [id number]    Date of Admission: 2019  Date of Encounter Visit: 05/10/19   LOS: 4 days   Patient Care Team:  Karen Red MD as PCP - General (Family Medicine)  Azam Banegas Jr., MD as Consulting Physician (Cardiology)    Chief Complaint: Patient had sleep apnea and we were consulted for CPAP adjustment and management    Hospital course: Patient did sleep better on the above setting of 9-15 which is an increase from his initial home setting of 5-15.  He does have severe sleep apnea with AHI of 60 along with significant hypoxemia.  We did have an overnight oximetry on the new or setting which was satisfactory.  He did have ruptured tendon and he is being monitored off the antiplatelet therapy before they can proceed with a surgical repair of the quadriceps tendon rupture        REVIEW OF SYSTEMS:   CONSTITUTIONAL: no fever or chills  CARDIOVASCULAR: No chest pain, chest pressure or chest discomfort. No palpitations or edema.   RESPIRATORY: No shortness of breath, cough or sputum.   GASTROINTESTINAL: No anorexia, nausea, vomiting or diarrhea. No abdominal pain or blood.   HEMATOLOGIC: No bleeding or bruising.     Ventilator/Non-Invasive Ventilation Settings (From admission, onward)    Start     Ordered    19 1134  NIPPV (CPAP or BIPAP)  At Bedtime & As Needed-RT     Comments:  Auto 9-15   Question Answer Comment   Type: AutoCPAP    NIPPV Mask Interface: Full Face Mask    Max Pressure 15 min 9        19 1133    19 0955  NIPPV (CPAP or BIPAP)  At Bedtime & As Needed-RT     Question Answer Comment   Type: AutoCPAP    NIPPV Mask Interface: Per Patient Preference        19 0954            Vital Signs  Temp:  [97.2 °F (36.2 °C)-97.9 °F (36.6 °C)] 97.2 °F (36.2 °C)  Heart Rate:  [42-54] 48  Resp:  [16-18] 18  BP: (129-163)/(56-66) 156/57  SpO2:  [95 %-99 %] 99 %  on  Flow (L/min):  [2] 2 Device  "(Oxygen Therapy): room air    Intake/Output Summary (Last 24 hours) at 5/10/2019 1235  Last data filed at 5/10/2019 0900  Gross per 24 hour   Intake 120 ml   Output 1050 ml   Net -930 ml     Flowsheet Rows      First Filed Value   Admission Height  185.4 cm (73\") Documented at 05/05/2019 2310   Admission Weight  141 kg (311 lb)  (Abnormal)  Documented at 05/06/2019 0000        Body mass index is 41.69 kg/m².      05/06/19  0000 05/06/19  0300 05/08/19  1756   Weight: (!) 141 kg (311 lb) (!) 144 kg (316 lb 6 oz) (!) 143 kg (316 lb)       Physical Exam:  GEN:  No acute distress, alert, cooperative, well developed   EYES:   Sclera clear. No icterus. PERRL. Normal EOM  ENT:   External ears/nose normal, no oral lesions, no thrush, mucous membranes moist Mallampati 4 airway  NECK:  Supple, midline trachea, no JVD  LUNGS: Normal chest on inspection, CTAB, no wheezes. No rhonchi. No crackles. Respirations regular, even and unlabored.   CV:  Regular rhythm and rate. Normal S1/S2. No murmurs, gallops, or rubs noted.  ABD:  Soft, non-tender and non-distended. Normal bowel sounds. No guarding  EXT: The right lower extremity is  immobilized with a brace . No cyanosis. No redness. No edema.   Skin: dry, intact, no bleeding    Results Review:      Results from last 7 days   Lab Units 05/07/19  0639 05/06/19  0721 05/06/19  0000   SODIUM mmol/L 137 142 142   POTASSIUM mmol/L 3.7 4.0 4.3   CHLORIDE mmol/L 105 106 105   CO2 mmol/L 24.2 24.5 22.1   BUN mg/dL 39* 45* 49*   CREATININE mg/dL 1.27 1.38* 1.53*   CALCIUM mg/dL 8.6 8.8 9.2   AST (SGOT) U/L  --   --  23   ALT (SGPT) U/L  --   --  14   ANION GAP mmol/L 7.8 11.5 14.9   ALBUMIN g/dL  --   --  4.10     Results from last 7 days   Lab Units 05/08/19  0554 05/07/19  1744 05/07/19  0639 05/06/19  0721 05/06/19  0000   CK TOTAL U/L  --   --  480* 517* 510*   TROPONIN T ng/mL <0.010 <0.010  --   --   --              Results from last 7 days   Lab Units 05/07/19  0639 05/06/19  0721 " 05/06/19  0000   WBC 10*3/mm3 6.15 8.17 13.61*   HEMOGLOBIN g/dL 10.4* 11.0* 12.4*   HEMATOCRIT % 33.7* 34.7* 39.8   PLATELETS 10*3/mm3 174 197 221   MCV fL 90.6 89.0 89.4   NEUTROPHIL % %  --   --  90.1*   LYMPHOCYTE % %  --   --  4.9*   MONOCYTES % %  --   --  4.4*   EOSINOPHIL % %  --   --  0.0*   BASOPHIL % %  --   --  0.2   IMM GRAN % %  --   --  0.4         Results from last 7 days   Lab Units 05/08/19  0554   MAGNESIUM mg/dL 2.6*     Results from last 7 days   Lab Units 05/08/19  0554   CHOLESTEROL mg/dL 115   TRIGLYCERIDES mg/dL 77   HDL CHOL mg/dL 39*         Results from last 7 days   Lab Units 05/06/19  0721   HEMOGLOBIN A1C % 6.00*     Glucose   Date/Time Value Ref Range Status   05/10/2019 1151 218 (H) 70 - 130 mg/dL Final   05/10/2019 0729 160 (H) 70 - 130 mg/dL Final   05/09/2019 2059 211 (H) 70 - 130 mg/dL Final   05/09/2019 1722 116 70 - 130 mg/dL Final   05/09/2019 1147 259 (H) 70 - 130 mg/dL Final   05/09/2019 0918 292 (H) 70 - 130 mg/dL Final   05/08/2019 2113 254 (H) 70 - 130 mg/dL Final   05/08/2019 1657 157 (H) 70 - 130 mg/dL Final                         Auto CPAP download:        Imaging:   Imaging Results (all)     Procedure Component Value Units Date/Time    CT Lower Extremity Left Without Contrast [[id number]] Collected:  05/06/19 1349     Updated:  05/06/19 1409    Narrative:       CT LEFT KNEE WITHOUT CONTRAST     HISTORY: Fall on left knee. Suspected fracture.     TECHNIQUE: CT includes axial imaging through the left knee and this data  was reconstructed in coronal and sagittal planes.     COMPARISON: Left femur x-rays 05/06/2019.     FINDINGS: Above the patella there are foci of ossification that are  suspected to represent foci of cortical avulsion or avulsed spurs at the  quadriceps tendon insertion. This also needs correlation with clinical  data. The majority of the quadriceps tendon appears to insert onto these  fragments. There is also suprapatellar effusion and there  is  extracapsular extension of fluid in this region with localized soft  tissue thickening. Additionally, the patella is somewhat low-lying and  the patellar tendon exhibits undulating configuration supporting  extensor mechanism failure.     Osteoarthritis is present greatest at the medial compartment. There is  osteochondral lesion measuring 19 x 7 mm at the weightbearing surface of  the medial femoral condyle and there has potentially been previous  surgery to this region.. Overlying cortical thinning and irregularity is  present. There is joint joint space narrowing with marginal spur  formation. Widening is present at the lateral compartment. There is  patellofemoral arthritis. Atherosclerotic calcification is present  involving the popliteal artery.       Impression:       1. There appears to be extensor mechanism failure with quadriceps  insertional avulsion at the upper pole of the patella The quadriceps  tendon inserts on foci of displaced ossification suspected to represent  displaced avulsed cortical fragments or spurs,  Knee joint effusion with  surrounding soft tissue edema and extracapsular extension of fluid into  the anterior knee subcutaneous fat. Patellar tendon exhibits undulating  configuration and the patella is somewhat low-lying.   2. Osteoarthritis with medial compartment joint space narrowing. Chronic  osteochondral lesion weightbearing surface medial femoral condyle.     Radiation dose reduction techniques were utilized, including automated  exposure control and exposure modulation based on body size.     This report was finalized on 5/6/2019 2:06 PM by Dr. Freddy Guzman M.D.       XR Femur 2 View Left [[id number]] Collected:  05/06/19 0036     Updated:  05/06/19 0043    Narrative:       6 VIEWS LEFT FEMUR     HISTORY: Left femur pain after a fall about 6 hours ago.     COMPARISON: None available.     FINDINGS:  The exam is degraded by patient positioning. No obvious acute fracture  or  subluxation of the left femur is seen. There is some fullness within  the suprapatellar pouch which may reflect an effusion. No aggressive  osseous abnormalities are seen. Dense atherosclerotic calcification of  the femoral vasculature is noted. There may also be some focal soft  tissue swelling within the suprapatellar region.       Impression:       No definite acute fracture or subluxation identified, although I think  the patient may have a small suprapatellar effusion, as well as some  overlying soft tissue swelling within the suprapatellar region.     This report was finalized on 5/6/2019 12:39 AM by Dr. Inocencia Cruz M.D.             I reviewed the patient's new clinical results.  I personally viewed and interpreted the patient's imaging results:        Medication Review:     amLODIPine 5 mg Oral Daily   atenolol 50 mg Oral Daily   atorvastatin 40 mg Oral Nightly   enoxaparin 40 mg Subcutaneous Q24H   insulin lispro 0-7 Units Subcutaneous 4x Daily With Meals & Nightly   miconazole nitrate  Topical Q12H   sennosides-docusate sodium 2 tablet Oral BID   sodium chloride 3 mL Intravenous Q12H            ASSESSMENT:   1. Severe SHASTA with AHI of 60  2. Severe sleep and hypoxemia with desaturation down to 66% on room air  3. History of trapped lung status post decortication with no other respiratory issues  4. Left quadriceps tendon ruptures for surgery  5. Preoperative pulmonary clearance, no contraindication from the pulmonary standpoint  6. Coronary artery disease status post CABG  7. Type 2 diabetes mellitus  8. Obesity  9. Chronic kidney disease stage III        PLAN:  Overnight oximetry on auto CPAP 9-15 was showing only borderline desaturation with no significant hypoxemia  His compliance download was showing adequate control of the sleep apnea  Need to adjust his home CPAP to 9-15 which is more optimal pressure based on the download and the patient did feel better on that one as well  Need to monitor  postoperatively for any worsening symptoms, his surgery is scheduled for the 14th, we do not have to follow-up over the weekend.  Please call with any question or concern and notify after the surgery so we can reassess after his procedure  Discussed with     Disposition: Patient is being monitored as an inpatient on today's date of surgery on the 14th    Neelam Matos MD  05/10/19  12:35 PM            Dictated utilizing Dragon dictation

## 2019-05-10 NOTE — PROGRESS NOTES
Continued Stay Note  [facility]     Patient Name: Rock Maciel Jr.  MRN: [id number]  Today's Date: 5/10/2019    Admit Date: 5/5/2019    Discharge Plan     Row Name 05/10/19 1639       Plan    Plan  Approved for [facility] pending pre-cert.     Plan Comments  Per Isabel the patient was approved for a bed at [facility] pending pre-cert.  Anticipating surgery on Tues. 5/14/19.  CCP will confirm d/c plan with patient, will follow for bed availability and pre-cert for [facility].  MAYANK Dowling        Discharge Codes    No documentation.             MAYANK Rodriguez

## 2019-05-10 NOTE — PLAN OF CARE
Problem: Patient Care Overview  Goal: Plan of Care Review  Outcome: Ongoing (interventions implemented as appropriate)   05/10/19 0414   Coping/Psychosocial   Plan of Care Reviewed With patient   Plan of Care Review   Progress improving   OTHER   Outcome Summary Pt increasing with standing tolerance and bed mobility. Pt remains NWB L LE

## 2019-05-10 NOTE — PLAN OF CARE
Problem: Patient Care Overview  Goal: Plan of Care Review  Outcome: Ongoing (interventions implemented as appropriate)   05/10/19 0574   Plan of Care Review   Progress no change   OTHER   Outcome Summary HR in 50's baseline, , no c/o pain but c/o tingling at left knee, brace in place, worked with PT today, still unsure if he will have to go to rehab & return next week for surgery, excoriated rodriguez area improved, auto BIPAP @ HS, overnight oximetry study completed     Goal: Individualization and Mutuality  Outcome: Ongoing (interventions implemented as appropriate)    Goal: Discharge Needs Assessment  Outcome: Ongoing (interventions implemented as appropriate)    Goal: Interprofessional Rounds/Family Conf  Outcome: Ongoing (interventions implemented as appropriate)      Problem: Fall Risk (Adult)  Goal: Absence of Fall  Outcome: Ongoing (interventions implemented as appropriate)      Problem: Pain, Acute (Adult)  Goal: Acceptable Pain Control/Comfort Level  Outcome: Ongoing (interventions implemented as appropriate)      Problem: Skin Injury Risk (Adult)  Goal: Skin Health and Integrity  Outcome: Ongoing (interventions implemented as appropriate)

## 2019-05-10 NOTE — PLAN OF CARE
Problem: Patient Care Overview  Goal: Plan of Care Review  Outcome: Ongoing (interventions implemented as appropriate)   05/10/19 1534   Coping/Psychosocial   Plan of Care Reviewed With patient   Plan of Care Review   Progress no change   OTHER   Outcome Summary no c/o pain, standing with PT assist, NWB on left, encourged to freq shift weight, surgery schd 5/14     Goal: Individualization and Mutuality  Outcome: Ongoing (interventions implemented as appropriate)      Problem: Fall Risk (Adult)  Goal: Absence of Fall  Outcome: Ongoing (interventions implemented as appropriate)      Problem: Pain, Acute (Adult)  Goal: Acceptable Pain Control/Comfort Level  Outcome: Ongoing (interventions implemented as appropriate)      Problem: Skin Injury Risk (Adult)  Goal: Skin Health and Integrity  Outcome: Ongoing (interventions implemented as appropriate)

## 2019-05-10 NOTE — THERAPY TREATMENT NOTE
Acute Care - Physical Therapy Treatment Note  [facility]     Patient Name: Rock Maciel Jr.  : 1945  MRN: [id number]  Today's Date: 5/10/2019  Onset of Illness/Injury or Date of Surgery: 19  Date of Referral to PT: 19  Referring Physician: Azra Fox APRN    Admit Date: 2019    Visit Dx:    ICD-10-CM ICD-9-CM   1. Fall, initial encounter W19.XXXA E888.9   2. Generalized weakness R53.1 780.79   3. Left leg pain M79.605 729.5   4. 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 (CMS/HCC)   • Chronic venous insufficiency   • Nonrheumatic aortic valve stenosis   • Carotid stenosis, asymptomatic   • Bradycardia   • Hyperlipidemia   • Bilateral carotid artery disease (CMS/HCC)   • Abnormal cardiovascular stress test   • Renal insufficiency   • H/O aortic valve replacement with porcine valve   • Charcot-Davida-Tooth disease-like deformity of foot   • Amputated toe of right foot (CMS/HCC)   • Fall   • Non-traumatic rhabdomyolysis   • S/P CABG x 2   • CKD (chronic kidney disease) stage 3, GFR 30-59 ml/min (CMS/HCC)   • Rupture of left quadriceps tendon       Therapy Treatment    Rehabilitation Treatment Summary     Row Name 05/10/19 1500             Treatment Time/Intention    Discipline  physical therapy assistant  -CW      Document Type  therapy note (daily note)  -CW      Subjective Information  complains of;weakness;fatigue  -CW       Mode of Treatment  physical therapy  -CW      Therapy Frequency (PT Clinical Impression)  daily  -CW      Patient Effort  good  -CW      Existing Precautions/Restrictions  fall;non-weight bearing NWB on Left LE per MD  -CW      Recorded by [CW] Andi oSrensen, PTA 05/10/19 1528      Row Name 05/10/19 1500             Vital Signs    O2 Delivery Pre Treatment  room air  -CW      Recorded by [CW] Andi Sorensen, PTA 05/10/19 1528      Row Name 05/10/19 1500             Cognitive Assessment/Intervention- PT/OT    Orientation Status (Cognition)  oriented x 3  -CW      Follows Commands (Cognition)  WNL  -CW      Personal Safety Interventions  fall prevention program maintained;gait belt;muscle strengthening facilitated;nonskid shoes/slippers when out of bed  -CW      Recorded by [CW] Andi Sorensen, PTA 05/10/19 1528      Row Name 05/10/19 1500             Bed Mobility Assessment/Treatment    Bed Mobility Assessment/Treatment  supine-sit;sit-supine  -CW      Supine-Sit [place] (Bed Mobility)  moderate assist (50% patient effort)  -CW      Sit-Supine [place] (Bed Mobility)  moderate assist (50% patient effort);2 person assist  -CW      Bed Mobility, Safety Issues  decreased use of arms for pushing/pulling;decreased use of legs for bridging/pushing  -CW      Assistive Device (Bed Mobility)  bed rails;draw sheet;head of bed elevated  -CW      Recorded by [CW] Andi Sorensen, PTA 05/10/19 1528      Row Name 05/10/19 1500             Transfer Assessment/Treatment    Transfer Assessment/Treatment  sit-stand transfer;stand-sit transfer  -CW      Recorded by [CW] Andi Sorensen, PTA 05/10/19 1528      Row Name 05/10/19 1500             Sit-Stand Transfer    Sit-Stand [place] (Transfers)  minimum assist (75% patient effort);2 person assist  -CW      Assistive Device (Sit-Stand Transfers)  bariatric;walker, front-wheeled  -CW      Recorded by [CW] Andi Sorensen, PTA 05/10/19 1528       Row Name 05/10/19 1500             Stand-Sit Transfer    Stand-Sit [place] (Transfers)  minimum assist (75% patient effort);2 person assist  -CW      Assistive Device (Stand-Sit Transfers)  bariatric;walker, front-wheeled  -CW      Recorded by [CW] Andi Sorensen, PTA 05/10/19 1528      Row Name 05/10/19 1500             Gait/Stairs Assessment/Training    [place] Level (Gait)  not tested;unable to assess  -CW      Recorded by [CW] Andi Sorensen, PTA 05/10/19 1528      Row Name 05/10/19 1500             Therapeutic Exercise    Lower Extremity (Therapeutic Exercise)  gluteal sets;LAQ (long arc quad), right;marching while seated  -CW      Exercise Type (Therapeutic Exercise)  AROM (active range of motion)  -CW      Position (Therapeutic Exercise)  seated  -CW      Sets/Reps (Therapeutic Exercise)  10  -CW      Recorded by [CW] Andi Sorensen, PTA 05/10/19 1528      Row Name 05/10/19 1500             Positioning and Restraints    Pre-Treatment Position  in bed  -CW      Post Treatment Position  bed  -CW      In Bed  notified nsg;ankita;call light within reach;encouraged to call for assist  -CW      Recorded by [CW] Andi Sorensen, PTA 05/10/19 1528      Row Name                Residual Limb Assessment 05/06/19 0900 other (see comments)    Residual Limb Assessment - Properties Group Date first assessed: 05/06/19 [LL] Time first assessed: 0900 [LL] Amputation Date: -- [LL], unknown  Location: other (see comments) [LL], Left great toe, Right small toes (4 &5)  Recorded by:  [LL] Ling Aleman RN 05/07/19 1218    Row Name 05/10/19 1500             Outcome Summary/Treatment Plan (PT)    Anticipated Discharge Disposition (PT)  skilled nursing facility  -CW      Recorded by [CW] Andi Sorensen, PTA 05/10/19 1528        User Key  (r) = Recorded By, (t) = Taken By, (c) = Cosigned By    Initials Name Effective Dates Discipline    CW Andi Sorensen, PTA 03/07/18 -  PT    LL  Ling Aleman RN 07/13/18 -  Nurse                   Physical Therapy Education     Title: PT OT SLP Therapies (In Progress)     Topic: Physical Therapy (Done)     Point: Mobility training (Done)     Learning Progress Summary           Patient Acceptance, E,TB, VU,DU by  at 5/10/2019  3:28 PM    Acceptance, E,TB, VU,DU by CW at 5/9/2019  1:34 PM    Acceptance, E,TB, VU,DU by  at 5/8/2019 10:50 AM    Acceptance, E,D, VU,DU by  at 5/7/2019 11:36 AM    Acceptance, E, NR by CA at 5/6/2019 10:55 AM                   Point: Home exercise program (Done)     Learning Progress Summary           Patient Acceptance, E,TB, VU,DU by  at 5/10/2019  3:28 PM    Acceptance, E,TB, VU,DU by CW at 5/9/2019  1:34 PM    Acceptance, E,TB, VU,DU by  at 5/8/2019 10:50 AM    Acceptance, E,D, VU,DU by  at 5/7/2019 11:36 AM    Acceptance, E, NR by CA at 5/6/2019 10:55 AM                   Point: Body mechanics (Done)     Learning Progress Summary           Patient Acceptance, E,TB, VU,DU by CW at 5/10/2019  3:28 PM    Acceptance, E,TB, VU,DU by CW at 5/9/2019  1:34 PM    Acceptance, E,TB, VU,DU by  at 5/8/2019 10:50 AM    Acceptance, E,D, VU,DU by  at 5/7/2019 11:36 AM    Acceptance, E, NR by CA at 5/6/2019 10:55 AM                   Point: Precautions (Done)     Learning Progress Summary           Patient Acceptance, E,TB, VU,DU by  at 5/10/2019  3:28 PM    Acceptance, E,TB, VU,DU by  at 5/9/2019  1:34 PM    Acceptance, E,TB, VU,DU by  at 5/8/2019 10:50 AM    Acceptance, E,D, VU,DU by  at 5/7/2019 11:36 AM    Acceptance, E, NR by CA at 5/6/2019 10:55 AM                               User Key     Initials Effective Dates Name Provider Type Discipline     03/07/18 -  Andi Sorensen, PTA Physical Therapy Assistant PT     08/19/18 -  Henrietta Barker, PTA Physical Therapy Assistant PT    CA 06/13/18 -  Ayanna Mar, PT Physical Therapist PT                PT Recommendation and Plan  Anticipated Discharge  Disposition (PT): skilled nursing facility  Therapy Frequency (PT Clinical Impression): daily  Outcome Summary/Treatment Plan (PT)  Anticipated Discharge Disposition (PT): skilled nursing facility  Plan of Care Reviewed With: patient  Progress: improving  Outcome Summary: Pt increasing with standing tolerance and bed mobility.  Pt remains NWB L LE  Outcome Measures     Row Name 05/10/19 1500 05/09/19 1300 05/08/19 1000       How much help from another person do you currently need...    Turning from your back to your side while in flat bed without using bedrails?  3  -CW  3  -CW  3  -CW    Moving from lying on back to sitting on the side of a flat bed without bedrails?  3  -CW  3  -CW  3  -CW    Moving to and from a bed to a chair (including a wheelchair)?  1  -CW  1  -CW  1  -CW    Standing up from a chair using your arms (e.g., wheelchair, bedside chair)?  2  -CW  2  -CW  2  -CW    Climbing 3-5 steps with a railing?  1  -CW  1  -CW  1  -CW    To walk in hospital room?  1  -CW  1  -CW  1  -CW    AM-PAC 6 Clicks Score  11  -CW  11  -CW  11  -CW       Functional Assessment    Outcome Measure Options  AM-PAC 6 Clicks Basic Mobility (PT)  -CW  AM-PAC 6 Clicks Basic Mobility (PT)  -CW  AM-PAC 6 Clicks Basic Mobility (PT)  -CW      User Key  (r) = Recorded By, (t) = Taken By, (c) = Cosigned By    Initials Name Provider Type    Andi Delgado PTA Physical Therapy Assistant         Time Calculation:   PT Charges     Row Name 05/10/19 1529             Time Calculation    Start Time  1514  -CW      Stop Time  1529  -CW      Time Calculation (min)  15 min  -CW      PT Received On  05/10/19  -CW      PT - Next Appointment  05/11/19  -CW        User Key  (r) = Recorded By, (t) = Taken By, (c) = Cosigned By    Initials Name Provider Type    Andi Delgado PTA Physical Therapy Assistant        Therapy Charges for Today     Code Description Service Date Service Provider Modifiers Qty    98376824272  PT THER PROC  EA 15 MIN 5/9/2019 Andi Sorensen, PTA GP 1    18676585969 HC PT THER PROC EA 15 MIN 5/10/2019 Andi Sorensen, PTA GP 1    17886906588 HC PT THER SUPP EA 15 MIN 5/10/2019 Andi Sorensen, PTA GP 1          PT G-Codes  Outcome Measure Options: AM-PAC 6 Clicks Basic Mobility (PT)  AM-PAC 6 Clicks Score: 11    Andi Sorensen PTA  5/10/2019

## 2019-05-10 NOTE — PROGRESS NOTES
Name: Rock Maciel Jr. ADMIT: 2019   : 1945  PCP: Karen Red MD    MRN: [id number] LOS: 4 days   AGE/SEX: 73 y.o. male  ROOM: \[facility]\""     Subjective   Subjective   CC: LLE pain, inability to bear weight  No acute events.  No new complaints.  Slept much better last night with CPAP.  Taking PO.  No f/c/n/v/d.      Objective   Objective   Vital Signs  Temp:  [97.2 °F (36.2 °C)-97.9 °F (36.6 °C)] 97.2 °F (36.2 °C)  Heart Rate:  [42-54] 48  Resp:  [16-18] 18  BP: (129-163)/(56-66) 156/57  SpO2:  [95 %-99 %] 99 %  on  Flow (L/min):  [2] 2;   Device (Oxygen Therapy): room air  Body mass index is 41.69 kg/m².  Physical Exam   Constitutional: He is oriented to person, place, and time. No distress.   HENT:   Head: Normocephalic and atraumatic.   Mouth/Throat: Oropharynx is clear and moist.   Eyes: Conjunctivae and EOM are normal. Pupils are equal, round, and reactive to light.   Neck: Normal range of motion. Neck supple.   Cardiovascular: Normal rate, regular rhythm and intact distal pulses.   Pulmonary/Chest: Effort normal and breath sounds normal.   Abdominal: Soft. Bowel sounds are normal.   Musculoskeletal: He exhibits edema (left knee with effusion) and tenderness (left knee).   Neurological: He is alert and oriented to person, place, and time.   Skin: Skin is warm and dry. He is not diaphoretic.   Psychiatric: He has a normal mood and affect. His behavior is normal.   Nursing note and vitals reviewed.      Results Review:       I reviewed the patient's new clinical results.  Results from last 7 days   Lab Units 19  0639 19  0721 19  0000   WBC 10*3/mm3 6.15 8.17 13.61*   HEMOGLOBIN g/dL 10.4* 11.0* 12.4*   PLATELETS 10*3/mm3 174 197 221     Results from last 7 days   Lab Units 19  0639 19  0721 19  0000   SODIUM mmol/L 137 142 142   POTASSIUM mmol/L 3.7 4.0 4.3   CHLORIDE mmol/L 105 106 105   CO2 mmol/L 24.2 24.5 22.1   BUN mg/dL 39* 45* 49*   CREATININE mg/dL  1.27 1.38* 1.53*   GLUCOSE mg/dL 130* 134* 156*   Estimated Creatinine Clearance: 75.8 mL/min (by C-G formula based on SCr of 1.27 mg/dL).  Results from last 7 days   Lab Units 05/06/19  0000   ALBUMIN g/dL 4.10   BILIRUBIN mg/dL 0.6   ALK PHOS U/L 117   AST (SGOT) U/L 23   ALT (SGPT) U/L 14     Results from last 7 days   Lab Units 05/08/19  0554 05/07/19  0639 05/06/19  0721 05/06/19  0000   CALCIUM mg/dL  --  8.6 8.8 9.2   ALBUMIN g/dL  --   --   --  4.10   MAGNESIUM mg/dL 2.6*  --   --   --        Glucose   Date/Time Value Ref Range Status   05/10/2019 1151 218 (H) 70 - 130 mg/dL Final   05/10/2019 0729 160 (H) 70 - 130 mg/dL Final   05/09/2019 2059 211 (H) 70 - 130 mg/dL Final   05/09/2019 1722 116 70 - 130 mg/dL Final   05/09/2019 1147 259 (H) 70 - 130 mg/dL Final   05/09/2019 0918 292 (H) 70 - 130 mg/dL Final   05/08/2019 2113 254 (H) 70 - 130 mg/dL Final         amLODIPine 5 mg Oral Daily   atenolol 50 mg Oral Daily   atorvastatin 40 mg Oral Nightly   enoxaparin 40 mg Subcutaneous Q24H   insulin lispro 0-7 Units Subcutaneous 4x Daily With Meals & Nightly   miconazole nitrate  Topical Q12H   sennosides-docusate sodium 2 tablet Oral BID   sodium chloride 3 mL Intravenous Q12H      Diet Regular; Cardiac, Consistent Carbohydrate  NPO Diet NPO Except: Sips With Meds       Assessment/Plan     Active Hospital Problems    Diagnosis  POA   • Rupture of left quadriceps tendon [S76.112A]  Yes   • Fall [W19.XXXA]  Yes   • Non-traumatic rhabdomyolysis [M62.82]  Yes   • S/P CABG x 2 [Z95.1]  Not Applicable   • CKD (chronic kidney disease) stage 3, GFR 30-59 ml/min (CMS/HCC) [N18.3]  Yes   • H/O aortic valve replacement with porcine valve [Z95.3]  Not Applicable   • Essential hypertension [I10]  Yes   • Type 2 diabetes mellitus with circulatory disorder, without long-term current use of insulin (CMS/[facility]) [E11.59]  Yes   • SHASTA (obstructive sleep apnea) [G47.33]  Yes      Resolved Hospital Problems   No resolved problems to  display.   Left Quadriceps Tendon Rupture  - from mechanical fall  - orthopedic surgery consulted, planning operative repair after plavix washout  - cardiology has seen for pre-operative clearance, appreciate recs  - encourage incentive spirometry  - bowel regimen     Type 2 DM  - controlled, A1C 6.00%  - on metformin and glipizide at home which is held  - cover with ssi     CKD stage 3  - baseline Cr appears to be 1.3-1.5  - will monitor     SHASTA  - he is on CPAP at home but has not brought it with him and he tells me he has no one to do so  - pulm consulted, on autoCPAP and cleared for surgery from pulmonary standpoint; appreciate recs  - encourage incentive spirometry    Skin Cancer  - 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    Jd Gatica MD  [facility]  05/10/19  1:12 PM

## 2019-05-11 PROBLEM — K59.00 CONSTIPATION: Status: ACTIVE | Noted: 2019-05-11

## 2019-05-11 LAB
ANION GAP SERPL CALCULATED.3IONS-SCNC: 9.8 MMOL/L
BUN BLD-MCNC: 37 MG/DL (ref 8–23)
BUN/CREAT SERPL: 28 (ref 7–25)
CALCIUM SPEC-SCNC: 8.5 MG/DL (ref 8.6–10.5)
CHLORIDE SERPL-SCNC: 105 MMOL/L (ref 98–107)
CO2 SERPL-SCNC: 25.2 MMOL/L (ref 22–29)
CREAT BLD-MCNC: 1.32 MG/DL (ref 0.76–1.27)
DEPRECATED RDW RBC AUTO: 47.1 FL (ref 37–54)
ERYTHROCYTE [DISTWIDTH] IN BLOOD BY AUTOMATED COUNT: 14.6 % (ref 12.3–15.4)
GFR SERPL CREATININE-BSD FRML MDRD: 53 ML/MIN/1.73
GLUCOSE BLD-MCNC: 157 MG/DL (ref 65–99)
GLUCOSE BLDC GLUCOMTR-MCNC: 155 MG/DL (ref 70–130)
GLUCOSE BLDC GLUCOMTR-MCNC: 160 MG/DL (ref 70–130)
GLUCOSE BLDC GLUCOMTR-MCNC: 230 MG/DL (ref 70–130)
GLUCOSE BLDC GLUCOMTR-MCNC: 246 MG/DL (ref 70–130)
HCT VFR BLD AUTO: 33.2 % (ref 37.5–51)
HGB BLD-MCNC: 10.5 G/DL (ref 13–17.7)
MCH RBC QN AUTO: 28.5 PG (ref 26.6–33)
MCHC RBC AUTO-ENTMCNC: 31.6 G/DL (ref 31.5–35.7)
MCV RBC AUTO: 90 FL (ref 79–97)
PLATELET # BLD AUTO: 192 10*3/MM3 (ref 140–450)
PMV BLD AUTO: 10.1 FL (ref 6–12)
POTASSIUM BLD-SCNC: 4.4 MMOL/L (ref 3.5–5.2)
RBC # BLD AUTO: 3.69 10*6/MM3 (ref 4.14–5.8)
SODIUM BLD-SCNC: 140 MMOL/L (ref 136–145)
WBC NRBC COR # BLD: 5.7 10*3/MM3 (ref 3.4–10.8)

## 2019-05-11 PROCEDURE — 85027 COMPLETE CBC AUTOMATED: CPT | Performed by: INTERNAL MEDICINE

## 2019-05-11 PROCEDURE — 25010000002 ENOXAPARIN PER 10 MG: Performed by: INTERNAL MEDICINE

## 2019-05-11 PROCEDURE — 80048 BASIC METABOLIC PNL TOTAL CA: CPT | Performed by: INTERNAL MEDICINE

## 2019-05-11 PROCEDURE — 63710000001 INSULIN LISPRO (HUMAN) PER 5 UNITS: Performed by: NURSE PRACTITIONER

## 2019-05-11 PROCEDURE — 82962 GLUCOSE BLOOD TEST: CPT

## 2019-05-11 RX ORDER — BISACODYL 10 MG
10 SUPPOSITORY, RECTAL RECTAL DAILY PRN
Status: DISCONTINUED | OUTPATIENT
Start: 2019-05-11 | End: 2019-05-19 | Stop reason: HOSPADM

## 2019-05-11 RX ADMIN — MICONAZOLE NITRATE: 2 OINTMENT TOPICAL at 10:00

## 2019-05-11 RX ADMIN — SODIUM CHLORIDE, PRESERVATIVE FREE 3 ML: 5 INJECTION INTRAVENOUS at 08:58

## 2019-05-11 RX ADMIN — INSULIN LISPRO 3 UNITS: 100 INJECTION, SOLUTION INTRAVENOUS; SUBCUTANEOUS at 11:54

## 2019-05-11 RX ADMIN — ENOXAPARIN SODIUM 40 MG: 40 INJECTION SUBCUTANEOUS at 18:06

## 2019-05-11 RX ADMIN — ATENOLOL 25 MG: 25 TABLET ORAL at 08:55

## 2019-05-11 RX ADMIN — AMLODIPINE BESYLATE 10 MG: 10 TABLET ORAL at 08:55

## 2019-05-11 RX ADMIN — SENNOSIDES AND DOCUSATE SODIUM 2 TABLET: 8.6; 5 TABLET ORAL at 08:55

## 2019-05-11 RX ADMIN — MICONAZOLE NITRATE: 2 OINTMENT TOPICAL at 21:35

## 2019-05-11 RX ADMIN — ATORVASTATIN CALCIUM 40 MG: 20 TABLET, FILM COATED ORAL at 21:34

## 2019-05-11 RX ADMIN — INSULIN LISPRO 2 UNITS: 100 INJECTION, SOLUTION INTRAVENOUS; SUBCUTANEOUS at 18:06

## 2019-05-11 RX ADMIN — INSULIN LISPRO 3 UNITS: 100 INJECTION, SOLUTION INTRAVENOUS; SUBCUTANEOUS at 21:34

## 2019-05-11 RX ADMIN — SODIUM CHLORIDE, PRESERVATIVE FREE 3 ML: 5 INJECTION INTRAVENOUS at 21:35

## 2019-05-11 RX ADMIN — SENNOSIDES AND DOCUSATE SODIUM 2 TABLET: 8.6; 5 TABLET ORAL at 21:34

## 2019-05-11 RX ADMIN — BISACODYL 10 MG: 10 SUPPOSITORY RECTAL at 14:58

## 2019-05-11 NOTE — PLAN OF CARE
Problem: Patient Care Overview  Goal: Plan of Care Review  Outcome: Ongoing (interventions implemented as appropriate)   05/11/19 8684   Coping/Psychosocial   Plan of Care Reviewed With patient   Plan of Care Review   Progress no change   OTHER   Outcome Summary Vitals as charted, no c/o pain, knee immobilizer on, pt has not had a bowel movement since Sunday, suppository ordered, will continue to monitor.

## 2019-05-11 NOTE — PROGRESS NOTES
Name: Rock Maciel Jr. ADMIT: 2019   : 1945  PCP: Karen Red MD    MRN: [id number] LOS: 5 days   AGE/SEX: 73 y.o. male  ROOM: [facility]     Subjective   Subjective   CC: LLE pain, inability to bear weight  No acute events.  No new complaints.  Slept much better last night with CPAP.  Taking PO.  No f/c/n/v/d.  Still constipated.    Objective   Objective   Vital Signs  Temp:  [97.5 °F (36.4 °C)-99 °F (37.2 °C)] 99 °F (37.2 °C)  Heart Rate:  [48-61] 51  Resp:  [16-18] 16  BP: (138-162)/(52-62) 138/52  SpO2:  [94 %-96 %] 96 %  on   ;   Device (Oxygen Therapy): room air  Body mass index is 41.69 kg/m².  Physical Exam   Constitutional: He is oriented to person, place, and time. No distress.   HENT:   Head: Normocephalic and atraumatic.   Mouth/Throat: Oropharynx is clear and moist.   Eyes: Conjunctivae and EOM are normal. Pupils are equal, round, and reactive to light.   Neck: Normal range of motion. Neck supple.   Cardiovascular: Normal rate, regular rhythm and intact distal pulses.   Pulmonary/Chest: Effort normal and breath sounds normal.   Abdominal: Soft. Bowel sounds are normal.   Musculoskeletal: He exhibits edema (left knee with effusion) and tenderness (left knee).   Neurological: He is alert and oriented to person, place, and time.   Skin: Skin is warm and dry. He is not diaphoretic.   Psychiatric: He has a normal mood and affect. His behavior is normal.   Nursing note and vitals reviewed.      Results Review:       I reviewed the patient's new clinical results.  Results from last 7 days   Lab Units 19  0640 19  0639 19  0721 19  0000   WBC 10*3/mm3 5.70 6.15 8.17 13.61*   HEMOGLOBIN g/dL 10.5* 10.4* 11.0* 12.4*   PLATELETS 10*3/mm3 192 174 197 221     Results from last 7 days   Lab Units 19  0640 19  0639 19  0721 19  0000   SODIUM mmol/L 140 137 142 142   POTASSIUM mmol/L 4.4 3.7 4.0 4.3   CHLORIDE mmol/L 105 105 106 105   CO2 mmol/L 25.2  24.2 24.5 22.1   BUN mg/dL 37* 39* 45* 49*   CREATININE mg/dL 1.32* 1.27 1.38* 1.53*   GLUCOSE mg/dL 157* 130* 134* 156*   Estimated Creatinine Clearance: 72.9 mL/min (A) (by C-G formula based on SCr of 1.32 mg/dL (H)).  Results from last 7 days   Lab Units 05/06/19  0000   ALBUMIN g/dL 4.10   BILIRUBIN mg/dL 0.6   ALK PHOS U/L 117   AST (SGOT) U/L 23   ALT (SGPT) U/L 14     Results from last 7 days   Lab Units 05/11/19  0640 05/08/19  0554 05/07/19  0639 05/06/19  0721 05/06/19  0000   CALCIUM mg/dL 8.5*  --  8.6 8.8 9.2   ALBUMIN g/dL  --   --   --   --  4.10   MAGNESIUM mg/dL  --  2.6*  --   --   --        Glucose   Date/Time Value Ref Range Status   05/11/2019 1129 246 (H) 70 - 130 mg/dL Final   05/11/2019 0722 160 (H) 70 - 130 mg/dL Final   05/10/2019 2052 242 (H) 70 - 130 mg/dL Final   05/10/2019 1540 191 (H) 70 - 130 mg/dL Final   05/10/2019 1151 218 (H) 70 - 130 mg/dL Final   05/10/2019 0729 160 (H) 70 - 130 mg/dL Final   05/09/2019 2059 211 (H) 70 - 130 mg/dL Final         amLODIPine 10 mg Oral Daily   atenolol 25 mg Oral Daily   atorvastatin 40 mg Oral Nightly   enoxaparin 40 mg Subcutaneous Q24H   insulin lispro 0-7 Units Subcutaneous 4x Daily With Meals & Nightly   miconazole nitrate  Topical Q12H   sennosides-docusate sodium 2 tablet Oral BID   sodium chloride 3 mL Intravenous Q12H      Diet Regular; Cardiac, Consistent Carbohydrate  NPO Diet NPO Except: Sips With Meds       Assessment/Plan     Active Hospital Problems    Diagnosis  POA   • Constipation [K59.00]  No   • Rupture of left quadriceps tendon [S76.112A]  Yes   • Fall [W19.XXXA]  Yes   • Non-traumatic rhabdomyolysis [M62.82]  Yes   • S/P CABG x 2 [Z95.1]  Not Applicable   • CKD (chronic kidney disease) stage 3, GFR 30-59 ml/min (CMS/HCC) [N18.3]  Yes   • H/O aortic valve replacement with porcine valve [Z95.3]  Not Applicable   • Essential hypertension [I10]  Yes   • Type 2 diabetes mellitus with circulatory disorder, without long-term current  use of insulin (CMS/[facility]) [E11.59]  Yes   • SHASTA (obstructive sleep apnea) [G47.33]  Yes      Resolved Hospital Problems   No resolved problems to display.   Left Quadriceps Tendon Rupture  - from mechanical fall  - orthopedic surgery consulted, planning operative repair after plavix washout  - cardiology has seen for pre-operative clearance, appreciate recs  - encourage incentive spirometry  - bowel regimen-will escalate due to constipation     Type 2 DM  - controlled, A1C 6.00%  - on metformin and glipizide at home which is held  - cover with ssi     CKD stage 3  - baseline Cr appears to be 1.3-1.5  - will monitor     SHASTA  - he is on CPAP at home but has not brought it with him and he tells me he has no one to do so  - pulm consulted, on autoCPAP and cleared for surgery from pulmonary standpoint; appreciate recs  - encourage incentive spirometry    Skin Cancer  - 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    Jd Gatica MD  [facility]  05/11/19  3:36 PM

## 2019-05-11 NOTE — PLAN OF CARE
Problem: Patient Care Overview  Goal: Plan of Care Review  Outcome: Ongoing (interventions implemented as appropriate)   05/11/19 0621   Coping/Psychosocial   Plan of Care Reviewed With patient      05/11/19 0621   Coping/Psychosocial   Plan of Care Reviewed With patient   OTHER   Outcome Summary Pt had a good night, slept well, CPAP on. Denied the need for pain medication. Knee immobilizer on. Repositioned every 2 hrs. Continue to monitor.       Problem: Fall Risk (Adult)  Goal: Absence of Fall  Outcome: Ongoing (interventions implemented as appropriate)      Problem: Pain, Acute (Adult)  Goal: Acceptable Pain Control/Comfort Level  Outcome: Ongoing (interventions implemented as appropriate)      Problem: Skin Injury Risk (Adult)  Goal: Skin Health and Integrity  Outcome: Ongoing (interventions implemented as appropriate)

## 2019-05-12 LAB
GLUCOSE BLDC GLUCOMTR-MCNC: 152 MG/DL (ref 70–130)
GLUCOSE BLDC GLUCOMTR-MCNC: 154 MG/DL (ref 70–130)
GLUCOSE BLDC GLUCOMTR-MCNC: 203 MG/DL (ref 70–130)
GLUCOSE BLDC GLUCOMTR-MCNC: 216 MG/DL (ref 70–130)

## 2019-05-12 PROCEDURE — 63710000001 INSULIN LISPRO (HUMAN) PER 5 UNITS: Performed by: NURSE PRACTITIONER

## 2019-05-12 PROCEDURE — 25010000002 ENOXAPARIN PER 10 MG: Performed by: INTERNAL MEDICINE

## 2019-05-12 PROCEDURE — 94799 UNLISTED PULMONARY SVC/PX: CPT

## 2019-05-12 PROCEDURE — 82962 GLUCOSE BLOOD TEST: CPT

## 2019-05-12 RX ADMIN — SODIUM CHLORIDE, PRESERVATIVE FREE 3 ML: 5 INJECTION INTRAVENOUS at 23:06

## 2019-05-12 RX ADMIN — MICONAZOLE NITRATE: 2 OINTMENT TOPICAL at 09:03

## 2019-05-12 RX ADMIN — ENOXAPARIN SODIUM 40 MG: 40 INJECTION SUBCUTANEOUS at 16:15

## 2019-05-12 RX ADMIN — AMLODIPINE BESYLATE 10 MG: 10 TABLET ORAL at 08:58

## 2019-05-12 RX ADMIN — INSULIN LISPRO 3 UNITS: 100 INJECTION, SOLUTION INTRAVENOUS; SUBCUTANEOUS at 12:18

## 2019-05-12 RX ADMIN — INSULIN LISPRO 3 UNITS: 100 INJECTION, SOLUTION INTRAVENOUS; SUBCUTANEOUS at 21:59

## 2019-05-12 RX ADMIN — SENNOSIDES AND DOCUSATE SODIUM 2 TABLET: 8.6; 5 TABLET ORAL at 21:44

## 2019-05-12 RX ADMIN — ZOLPIDEM TARTRATE 5 MG: 5 TABLET ORAL at 00:13

## 2019-05-12 RX ADMIN — INSULIN LISPRO 2 UNITS: 100 INJECTION, SOLUTION INTRAVENOUS; SUBCUTANEOUS at 08:58

## 2019-05-12 RX ADMIN — INSULIN LISPRO 2 UNITS: 100 INJECTION, SOLUTION INTRAVENOUS; SUBCUTANEOUS at 17:31

## 2019-05-12 RX ADMIN — MICONAZOLE NITRATE: 2 OINTMENT TOPICAL at 21:56

## 2019-05-12 RX ADMIN — ZOLPIDEM TARTRATE 5 MG: 5 TABLET ORAL at 23:06

## 2019-05-12 RX ADMIN — ATORVASTATIN CALCIUM 40 MG: 20 TABLET, FILM COATED ORAL at 21:44

## 2019-05-12 RX ADMIN — SODIUM CHLORIDE, PRESERVATIVE FREE 3 ML: 5 INJECTION INTRAVENOUS at 09:00

## 2019-05-12 NOTE — PROGRESS NOTES
Name: Rock Maciel Jr. ADMIT: 2019   : 1945  PCP: Karen Red MD    MRN: [id number] LOS: 6 days   AGE/SEX: 73 y.o. male  ROOM: [facility]     Subjective   Subjective   CC: LLE pain, inability to bear weight  No acute events.  No new complaints.  Taking PO.  No f/c/n/v/d.  Constipation relieved.    Objective   Objective   Vital Signs  Temp:  [97.1 °F (36.2 °C)-98.1 °F (36.7 °C)] 97.3 °F (36.3 °C)  Heart Rate:  [50-88] 88  Resp:  [16-18] 16  BP: (129-156)/(54-64) 138/60  SpO2:  [92 %-97 %] 97 %  on   ;   Device (Oxygen Therapy): room air  Body mass index is 41.69 kg/m².  Physical Exam   Constitutional: He is oriented to person, place, and time. No distress.   HENT:   Head: Normocephalic and atraumatic.   Mouth/Throat: Oropharynx is clear and moist.   Eyes: Conjunctivae and EOM are normal. Pupils are equal, round, and reactive to light.   Neck: Normal range of motion. Neck supple.   Cardiovascular: Normal rate, regular rhythm and intact distal pulses.   Pulmonary/Chest: Effort normal and breath sounds normal.   Abdominal: Soft. Bowel sounds are normal.   Musculoskeletal: He exhibits edema (left knee with effusion) and tenderness (left knee).   Neurological: He is alert and oriented to person, place, and time.   Skin: Skin is warm and dry. He is not diaphoretic.   Bruising over the right achilles tendon area with mild tenderness and normal strength at the ankle   Psychiatric: He has a normal mood and affect. His behavior is normal.   Nursing note and vitals reviewed.      Results Review:       I reviewed the patient's new clinical results.  Results from last 7 days   Lab Units 19  0640 19  0639 19  0721 19  0000   WBC 10*3/mm3 5.70 6.15 8.17 13.61*   HEMOGLOBIN g/dL 10.5* 10.4* 11.0* 12.4*   PLATELETS 10*3/mm3 192 174 197 221     Results from last 7 days   Lab Units 19  0640 19  0639 19  0721 19  0000   SODIUM mmol/L 140 137 142 142   POTASSIUM mmol/L 4.4  3.7 4.0 4.3   CHLORIDE mmol/L 105 105 106 105   CO2 mmol/L 25.2 24.2 24.5 22.1   BUN mg/dL 37* 39* 45* 49*   CREATININE mg/dL 1.32* 1.27 1.38* 1.53*   GLUCOSE mg/dL 157* 130* 134* 156*   Estimated Creatinine Clearance: 72.9 mL/min (A) (by C-G formula based on SCr of 1.32 mg/dL (H)).  Results from last 7 days   Lab Units 05/06/19  0000   ALBUMIN g/dL 4.10   BILIRUBIN mg/dL 0.6   ALK PHOS U/L 117   AST (SGOT) U/L 23   ALT (SGPT) U/L 14     Results from last 7 days   Lab Units 05/11/19  0640 05/08/19  0554 05/07/19  0639 05/06/19  0721 05/06/19  0000   CALCIUM mg/dL 8.5*  --  8.6 8.8 9.2   ALBUMIN g/dL  --   --   --   --  4.10   MAGNESIUM mg/dL  --  2.6*  --   --   --        Glucose   Date/Time Value Ref Range Status   05/12/2019 1142 203 (H) 70 - 130 mg/dL Final   05/12/2019 0735 154 (H) 70 - 130 mg/dL Final   05/11/2019 2036 230 (H) 70 - 130 mg/dL Final   05/11/2019 1626 155 (H) 70 - 130 mg/dL Final   05/11/2019 1129 246 (H) 70 - 130 mg/dL Final   05/11/2019 0722 160 (H) 70 - 130 mg/dL Final   05/10/2019 2052 242 (H) 70 - 130 mg/dL Final         amLODIPine 10 mg Oral Daily   atenolol 25 mg Oral Daily   atorvastatin 40 mg Oral Nightly   enoxaparin 40 mg Subcutaneous Q24H   insulin lispro 0-7 Units Subcutaneous 4x Daily With Meals & Nightly   miconazole nitrate  Topical Q12H   sennosides-docusate sodium 2 tablet Oral BID   sodium chloride 3 mL Intravenous Q12H      Diet Regular; Cardiac, Consistent Carbohydrate  NPO Diet NPO Except: Sips With Meds       Assessment/Plan     Active Hospital Problems    Diagnosis  POA   • Constipation [K59.00]  No   • Rupture of left quadriceps tendon [S76.112A]  Yes   • Fall [W19.XXXA]  Yes   • Non-traumatic rhabdomyolysis [M62.82]  Yes   • 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   Left Quadriceps Tendon Rupture  - from mechanical fall  - orthopedic surgery consulted, planning operative repair after plavix washout-this is planned for Tuesday 5/14/19  - cardiology has seen for pre-operative clearance, appreciate recs  - encourage incentive spirometry  - bowel regimen-constipation resolved     Type 2 DM  - controlled, A1C 6.00%  - on metformin and glipizide at home which are held  - cover with ssi     CKD stage 3  - baseline Cr appears to be 1.3-1.5  - will monitor     SHASTA  - he is on CPAP at home but has not brought it with him and he tells me he has no one to do so  - pulm consulted, on autoCPAP and cleared for surgery from pulmonary standpoint; appreciate recs  - encourage incentive spirometry    Skin Cancer  - likely SCC, occipital region  - follows dermatology as outpatient and was supposed to get this removed, he will need to reschedule    Right Calf Erythema  - looks like a small hematoma and overlying bruise, really more over the right achilles area  - no other swelling or palpable cords  - very unlikely DVT, no plans for LE ultrasound at this point    VTE Prophylaxis - Lovenox 40 mg SC daily-to be held the day prior to surgery  Code Status - Full code  Disposition - TBD    Thanks to consultants.    Jd Gatica MD  [facility]  05/12/19  12:17 PM

## 2019-05-12 NOTE — PLAN OF CARE
Problem: Patient Care Overview  Goal: Plan of Care Review  Outcome: Ongoing (interventions implemented as appropriate)   05/12/19 5768   Coping/Psychosocial   Plan of Care Reviewed With patient   Plan of Care Review   Progress no change   OTHER   Outcome Summary no complaints of pain today. PT still hasn't seen patient today. New IV placed. patient stated that he is moving himself some in bed but turns are still encouraged. IS with encouragment. held beta blocker due to low heart rate this morning and patient refused stool softner. Found small blistered area on inner left thigh looks like due to brace.VSS. will continue to monitor.      Goal: Individualization and Mutuality  Outcome: Ongoing (interventions implemented as appropriate)   05/10/19 1534   Individualization   Patient Specific Preferences goes by Guille   Patient Specific Goals (Include Timeframe) improve mobility, control pain   Patient Specific Interventions prn pain meds, PT       Problem: Fall Risk (Adult)  Goal: Absence of Fall  Outcome: Ongoing (interventions implemented as appropriate)      Problem: Pain, Acute (Adult)  Goal: Acceptable Pain Control/Comfort Level  Outcome: Ongoing (interventions implemented as appropriate)      Problem: Skin Injury Risk (Adult)  Goal: Skin Health and Integrity  Outcome: Ongoing (interventions implemented as appropriate)

## 2019-05-12 NOTE — NURSING NOTE
Spoke with Dr Gatica due to concern for Right lower extremity reddened area on back of calf. MD to order duplex doppler.

## 2019-05-12 NOTE — PLAN OF CARE
Problem: Patient Care Overview  Goal: Plan of Care Review  Outcome: Ongoing (interventions implemented as appropriate)   05/12/19 0523   Coping/Psychosocial   Plan of Care Reviewed With patient   Plan of Care Review   Progress no change   OTHER   Outcome Summary Pt had a good night, denied pain. Knee immobilizer on. Had a BM. Turn q 2hrs. CPAP at night. VSS. Continue to monitor.       Problem: Fall Risk (Adult)  Goal: Absence of Fall  Outcome: Ongoing (interventions implemented as appropriate)      Problem: Pain, Acute (Adult)  Goal: Acceptable Pain Control/Comfort Level  Outcome: Ongoing (interventions implemented as appropriate)      Problem: Skin Injury Risk (Adult)  Goal: Skin Health and Integrity  Outcome: Ongoing (interventions implemented as appropriate)

## 2019-05-13 LAB
GLUCOSE BLDC GLUCOMTR-MCNC: 150 MG/DL (ref 70–130)
GLUCOSE BLDC GLUCOMTR-MCNC: 195 MG/DL (ref 70–130)
GLUCOSE BLDC GLUCOMTR-MCNC: 197 MG/DL (ref 70–130)
GLUCOSE BLDC GLUCOMTR-MCNC: 227 MG/DL (ref 70–130)

## 2019-05-13 PROCEDURE — 25010000002 ENOXAPARIN PER 10 MG: Performed by: INTERNAL MEDICINE

## 2019-05-13 PROCEDURE — 97110 THERAPEUTIC EXERCISES: CPT

## 2019-05-13 PROCEDURE — 63710000001 INSULIN LISPRO (HUMAN) PER 5 UNITS: Performed by: NURSE PRACTITIONER

## 2019-05-13 PROCEDURE — 82962 GLUCOSE BLOOD TEST: CPT

## 2019-05-13 PROCEDURE — 99231 SBSQ HOSP IP/OBS SF/LOW 25: CPT | Performed by: NURSE PRACTITIONER

## 2019-05-13 RX ADMIN — INSULIN LISPRO 3 UNITS: 100 INJECTION, SOLUTION INTRAVENOUS; SUBCUTANEOUS at 18:26

## 2019-05-13 RX ADMIN — MICONAZOLE NITRATE: 2 OINTMENT TOPICAL at 09:50

## 2019-05-13 RX ADMIN — ATENOLOL 25 MG: 25 TABLET ORAL at 09:49

## 2019-05-13 RX ADMIN — INSULIN LISPRO 2 UNITS: 100 INJECTION, SOLUTION INTRAVENOUS; SUBCUTANEOUS at 21:49

## 2019-05-13 RX ADMIN — ENOXAPARIN SODIUM 40 MG: 40 INJECTION SUBCUTANEOUS at 15:40

## 2019-05-13 RX ADMIN — AMLODIPINE BESYLATE 10 MG: 10 TABLET ORAL at 09:49

## 2019-05-13 RX ADMIN — SODIUM CHLORIDE, PRESERVATIVE FREE 3 ML: 5 INJECTION INTRAVENOUS at 09:45

## 2019-05-13 RX ADMIN — MICONAZOLE NITRATE: 2 OINTMENT TOPICAL at 21:50

## 2019-05-13 RX ADMIN — SODIUM CHLORIDE, PRESERVATIVE FREE 3 ML: 5 INJECTION INTRAVENOUS at 21:51

## 2019-05-13 RX ADMIN — ATORVASTATIN CALCIUM 40 MG: 20 TABLET, FILM COATED ORAL at 21:43

## 2019-05-13 RX ADMIN — SENNOSIDES AND DOCUSATE SODIUM 2 TABLET: 8.6; 5 TABLET ORAL at 21:43

## 2019-05-13 RX ADMIN — INSULIN LISPRO 2 UNITS: 100 INJECTION, SOLUTION INTRAVENOUS; SUBCUTANEOUS at 13:33

## 2019-05-13 RX ADMIN — ZOLPIDEM TARTRATE 5 MG: 5 TABLET ORAL at 23:09

## 2019-05-13 RX ADMIN — SENNOSIDES AND DOCUSATE SODIUM 2 TABLET: 8.6; 5 TABLET ORAL at 09:49

## 2019-05-13 NOTE — PLAN OF CARE
Problem: Patient Care Overview  Goal: Plan of Care Review  Outcome: Ongoing (interventions implemented as appropriate)   05/13/19 7557   Coping/Psychosocial   Plan of Care Reviewed With patient   Plan of Care Review   Progress improving   OTHER   Outcome Summary Pt increasing with bed mobility since trap bar installed. Pt continues to work on transfer to RWX and standing balance on the R due to L LE NWB

## 2019-05-13 NOTE — PROGRESS NOTES
[facility]  Cardiology Progress Note    Patient Identification:  Name: Rokc Maciel Jr.  Age: 73 y.o.  Sex: male  :  1945  MRN: [id number]                 Follow Up / Chief Complaint: cardiac clearance    Interval History: Surgery to repair left quad tendon scheduled for tomorrow. BP remains elevated.  Bradycardia improved.      Subjective: Patient with leg pain with movement, but denies chest pain and shortness of breath.  Leg brace repositioned for excoriation at joint.       Objective:    Past Medical History:  Past Medical History:   Diagnosis Date   • Allergic rhinitis    • Bronchitis    • Coronary artery disease    • Diabetes mellitus (CMS/[facility])    • DM (diabetes mellitus) (CMS/[facility])    • Encounter for annual health examination 2014    Annual Health Assessment   • Gout    • Hiatal hernia    • History of MRSA infection     RIGHT FOOT    • Hyperlipidemia    • Hypertension    • Murmur    • Pneumothorax on right    • Sleep apnea     pt wears CPAP at night   • Wellness examination 2015    Annual Wellness Visit     Past Surgical History:  Past Surgical History:   Procedure Laterality Date   • ARTERY SURGERY Bilateral     carotid   • CARDIAC CATHETERIZATION N/A 2018    Procedure: Left Heart Cath;  Surgeon: Jo Toney MD;  Location: [facility];  Service: Cardiovascular   • CARDIAC CATHETERIZATION N/A 2018    Procedure: Coronary angiography;  Surgeon: Jo Toney MD;  Location:  [facility] CATH INVASIVE LOCATION;  Service: Cardiovascular   • CAROTID ENDARTERECTOMY Bilateral    • COLONOSCOPY     • CORONARY ARTERY BYPASS GRAFT WITH AORTIC VALVE REPAIR/REPLACEMENT N/A 2018    Procedure: INTRAOPERATIVE ALEX, MIDLINE STERNOTOMY, CORONARY ARTERY BYPASS GRAFTING X 3 USING ENDOSCOPICALLY HARVESTED LEFT GREATER SAPHENOUS VEIN,  AORTIC VALVE REPLACEMENT USING 25MM LOPEZ II ULTRA PORCINE VALVE, PRP;  Surgeon: Phong Posey MD;   Location: [facility] OR;  Service: Cardiothoracic   • FOOT SURGERY Right 2010    5th digit removal   • FOOT SURGERY Left 2011    1 digit removed   • THORACENTESIS Right 11/21/2016   • THORACOSCOPY Right 5/8/2017    Procedure: BRONCHOSCOPY, RIGHT VAT,  TOTAL DECORTICATION RIGHT LUNG, PLEURAL BX, PLACEMENT SUBPLEURAL PAIN CAATHETERS X2;  Surgeon: Donald Orlando III, MD;  Location: [facility];  Service:    • TONSILECTOMY, ADENOIDECTOMY, BILATERAL MYRINGOTOMY AND TUBES          Social History:   Social History     Tobacco Use   • Smoking status: Never Smoker   • Smokeless tobacco: Never Used   Substance Use Topics   • Alcohol use: Yes     Comment: either one glass wine or one glass liquor per  day      Family History:  Family History   Problem Relation Age of Onset   • Hypertension Father           Allergies:  Allergies   Allergen Reactions   • Ceclor [Cefaclor] Rash     Scheduled Meds:    amLODIPine 10 mg Daily   atenolol 25 mg Daily   atorvastatin 40 mg Nightly   insulin lispro 0-7 Units 4x Daily With Meals & Nightly   miconazole nitrate  Q12H   sennosides-docusate sodium 2 tablet BID   sodium chloride 3 mL Q12H       I have reviewed the patient's recent medical history and current medications.     INTAKE AND OUTPUT:    Intake/Output Summary (Last 24 hours) at 5/13/2019 1331  Last data filed at 5/13/2019 1206  Gross per 24 hour   Intake 420 ml   Output 425 ml   Net -5 ml       Review of Systems:   GI: Denies abdominal pain and n/v/d  Cardiac: Denies chest pain and palpitations  Pulmonary: Denies shortness of breath and cough    Constitutional:  Temp:  [97 °F (36.1 °C)-98.6 °F (37 °C)] 97.8 °F (36.6 °C)  Heart Rate:  [51-53] 51  Resp:  [16-18] 18  BP: (143-163)/(60-67) 163/62    Physical Exam:   General:  Appears in no acute distress; resting comfortably in bed   Eyes: PERTL,  HEENT:  No JVD. Thyroid not visibly enlarged. No mucosal pallor or cyanosis  Respiratory: Respirations regular and unlabored at rest.  BBS with good air entry anteriorly and laterally. No crackles, rubs or wheezes auscultated  Cardiovascular: S1S2 Regular rate and rhythm. No murmur, rub or gallop. No carotid bruits. DP/PT pulses 1+. Trace-1+ pretibial pitting edema-unchanged  Gastrointestinal: Abdomen soft, round, non tender. Bowel sounds present. No ascites  Musculoskeletal: MASRHALL x4. No abnormal movements-amputated LE digits noted  Extremities: No digital clubbing or cyanosis  Skin: Color pink. Skin warm and dry to touch. Excoriation o right knee from brace noted    Neuro: AAO x3 CN II-XII grossly intact  Psych: Mood and affect normal, pleasant and cooperative          Cardiographics  Telemetry:   Unavailable     EC/7 SR rate 50s-talisha, RBBB known-similar to prior tracing        2018 SR rate 70s RBBB            Echocardiogram:       Interpretation Summary     · Left ventricular systolic function is normal. Calculated EF = 64.0%. Estimated EF was in agreement with the calculated EF. Estimated EF = 64%. Normal left ventricular cavity size noted. All left ventricular wall segments contract normally. Left ventricular wall thickness is consistent with mild concentric hypertrophy. Left ventricular diastolic dysfunction is noted (grade II w/high LAP) consistent with pseudonormalization.  · Left atrial volume is borderline increased.  · There is a prosthetic aortic valve. No aortic valve stenosis is present. There is a bioprosthetic valve present. Hemodynamically significant regurgitation is present. There is moderate prosthetic aortic valve regurgitation. Cannot tell from the study if this is paravalvular transvalvular in origin..  · Severe MAC is present. There is a nodular appearance to the mitral valve leaflets. Cannot rule out nodular sclerosis versus vegetative lesions.. There is severe bileaflet mitral valve thickening present. Mild mitral valve regurgitation is present.            2018  Interpretation Summary     · Limited echo  performed  · Suboptimal scan  · LV function appears to be normal  · No pericardial effusion         5/2018  Interpretation Summary     · Left atrial cavity size is moderately dilated.  · Mild mitral valve regurgitation is present  · Mild tricuspid valve regurgitation is present.  · Calculated EF = 68%.  · There is no evidence of pericardial effusion.  · There is calcification of the aortic valve.  · Mild to moderate aortic valve stenosis is present.       Stress test  9/2018  Interpretation Summary        · Equivocal ECG evidence of myocardial ischemia.Negative clinical evidence of myocardial ischemia. Findings consistent with an equivocal ECG stress test. Submaximal Stress Test.  · . Asymptomatic for chest pain       Cardiac catheterization  7/2018  Conclusion        · Successful right and left coronary angiogram and LV pressures  · Normal left main  · Minimum diffuse left anterior descending irregularity with midportion 40-50% stenosis  · Circumflex artery has OM ostial 70% distal circumflex 60% stenosis  · Right coronary artery dominant with mid and distal portion 70% stenosis  · Severe aortic stenosis with gradient 60 mmHg               Lab Review   Results from last 7 days   Lab Units 05/08/19  0554 05/07/19  1744 05/07/19  0639   CK TOTAL U/L  --   --  480*   TROPONIN T ng/mL <0.010 <0.010  --      Results from last 7 days   Lab Units 05/08/19  0554   MAGNESIUM mg/dL 2.6*     Results from last 7 days   Lab Units 05/11/19  0640   SODIUM mmol/L 140   POTASSIUM mmol/L 4.4   BUN mg/dL 37*   CREATININE mg/dL 1.32*   CALCIUM mg/dL 8.5*       Results from last 7 days   Lab Units 05/11/19  0640 05/07/19  0639   WBC 10*3/mm3 5.70 6.15   HEMOGLOBIN g/dL 10.5* 10.4*   HEMATOCRIT % 33.2* 33.7*   PLATELETS 10*3/mm3 192 174               Assessment/plan    1.  Cardiac clearance-Surgery scheduled for tomorrow afternoon.    2.  CAD-Continues to be without chest pain and shortness of breath.     3.  Hypertension-Remains  "elevated. Patient is in a fair amount of pain with any movement, will refrain from titrating or adding BP meds until after surgery when pain is better controlled.     4.  Left knee pain-Surgery scheduled for tomorrow afternoon.     5. Bradycardia-Improved with reduction of BB.            )5/13/2019  ZEINA Wilson      EMR César/Transcription:   \"Dictated utilizing Dragon dictation\".   "

## 2019-05-13 NOTE — THERAPY TREATMENT NOTE
Acute Care - Physical Therapy Treatment Note  [facility]     Patient Name: Rock Maciel Jr.  : 1945  MRN: [id number]  Today's Date: 2019  Onset of Illness/Injury or Date of Surgery: 19  Date of Referral to PT: 19  Referring Physician: Azra Fox APRN    Admit Date: 2019    Visit Dx:    ICD-10-CM ICD-9-CM   1. Fall, initial encounter W19.XXXA E888.9   2. Generalized weakness R53.1 780.79   3. Left leg pain M79.605 729.5   4. 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 (CMS/HCC)   • Chronic venous insufficiency   • Nonrheumatic aortic valve stenosis   • Carotid stenosis, asymptomatic   • Bradycardia   • Hyperlipidemia   • Bilateral carotid artery disease (CMS/HCC)   • Abnormal cardiovascular stress test   • Renal insufficiency   • H/O aortic valve replacement with porcine valve   • Charcot-Davida-Tooth disease-like deformity of foot   • Amputated toe of right foot (CMS/HCC)   • Fall   • Non-traumatic rhabdomyolysis   • S/P CABG x 2   • CKD (chronic kidney disease) stage 3, GFR 30-59 ml/min (CMS/HCC)   • Rupture of left quadriceps tendon   • Constipation       Therapy Treatment    Rehabilitation Treatment Summary     Row Name 19 1400             Treatment Time/Intention    Discipline  physical therapy assistant  -CW      Document Type  therapy note (daily note)  -CW      Subjective Information  complains  of;weakness;fatigue  -CW      Mode of Treatment  physical therapy  -CW      Therapy Frequency (PT Clinical Impression)  daily  -CW      Patient Effort  good  -CW      Existing Precautions/Restrictions  fall;non-weight bearing NWB on Left LE per MD  -CW      Recorded by [CW] Andi Sorensen, PTA 05/13/19 1439      Row Name 05/13/19 1400             Vital Signs    O2 Delivery Pre Treatment  room air  -CW      Recorded by [CW] Andi Sorensen, PTA 05/13/19 1439      Row Name 05/13/19 1400             Cognitive Assessment/Intervention- PT/OT    Orientation Status (Cognition)  oriented x 3  -CW      Follows Commands (Cognition)  WNL  -CW      Personal Safety Interventions  fall prevention program maintained;gait belt;muscle strengthening facilitated;nonskid shoes/slippers when out of bed  -CW      Recorded by [CW] Andi Sorensen, PTA 05/13/19 1439      Row Name 05/13/19 1400             Bed Mobility Assessment/Treatment    Bed Mobility Assessment/Treatment  supine-sit;sit-supine  -CW      Supine-Sit [place] (Bed Mobility)  moderate assist (50% patient effort)  -CW      Sit-Supine [place] (Bed Mobility)  moderate assist (50% patient effort);2 person assist  -CW      Bed Mobility, Safety Issues  decreased use of arms for pushing/pulling;decreased use of legs for bridging/pushing  -CW      Assistive Device (Bed Mobility)  bed rails;draw sheet;head of bed elevated  -CW      Recorded by [CW] Andi Sorensen, PTA 05/13/19 1439      Row Name 05/13/19 1400             Transfer Assessment/Treatment    Transfer Assessment/Treatment  sit-stand transfer;stand-sit transfer  -CW      Recorded by [CW] Andi Sorensen, PTA 05/13/19 1439      Row Name 05/13/19 1400             Sit-Stand Transfer    Sit-Stand [place] (Transfers)  minimum assist (75% patient effort);2 person assist  -CW      Assistive Device (Sit-Stand Transfers)  bariatric;walker, front-wheeled  -CW      Recorded by [CW] Edu  Andi PICKARD, PTA 05/13/19 1439      Row Name 05/13/19 1400             Stand-Sit Transfer    Stand-Sit [place] (Transfers)  minimum assist (75% patient effort);2 person assist  -CW      Assistive Device (Stand-Sit Transfers)  bariatric;walker, front-wheeled  -CW      Recorded by [CW] Andi Sorensen, PTA 05/13/19 1439      Row Name 05/13/19 1400             Gait/Stairs Assessment/Training    [place] Level (Gait)  not tested;unable to assess  -CW      Recorded by [CW] Andi Sorensen, GORDO 05/13/19 1439      Row Name 05/13/19 1400             Positioning and Restraints    Pre-Treatment Position  in bed  -CW      Post Treatment Position  bed  -CW      In Bed  notified nsg;supine;call light within reach;encouraged to call for assist  -CW      Recorded by [CW] Andi Sorensen, PTA 05/13/19 1439      Row Name                Residual Limb Assessment 05/06/19 0900 other (see comments)    Residual Limb Assessment - Properties Group Date first assessed: 05/06/19 [LL] Time first assessed: 0900 [LL] Amputation Date: -- [LL], unknown  Location: other (see comments) [LL], Left great toe, Right small toes (4 &5)  Recorded by:  [LL] Ling Aleman RN 05/07/19 1218    Row Name                Wound 05/12/19 0900 Left anterior;upper thigh blisters    Wound - Properties Group Date first assessed: 05/12/19 [AE] Time first assessed: 0900 [AE] Present On Admission : picture taken [AE] Side: Left [AE] Orientation: anterior;upper [AE] Location: thigh [AE] Type: blisters [AE] Recorded by:  [AE] Nina Holman RN 05/12/19 1241    Row Name 05/13/19 1400             Outcome Summary/Treatment Plan (PT)    Anticipated Discharge Disposition (PT)  skilled nursing facility  -CW      Recorded by [CW] Andi Sorensen PTA 05/13/19 1439        User Key  (r) = Recorded By, (t) = Taken By, (c) = Cosigned By    Initials Name Effective Dates Discipline    Nina Higgins RN 09/07/17 -  Nurse    CW Andi Sorensen, PTA  03/07/18 -  PT    LL Ling Aleman RN 07/13/18 -  Nurse          Wound 05/12/19 0900 Left anterior;upper thigh blisters (Active)   Dressing Appearance no drainage 5/13/2019 12:09 AM   Closure Open to air 5/13/2019  9:50 AM   Base pink;clean;dry 5/13/2019  9:50 AM   Periwound intact 5/13/2019  9:50 AM   Periwound Temperature warm 5/13/2019  9:50 AM   Periwound Skin Turgor soft 5/13/2019  9:50 AM   Drainage Amount none 5/13/2019  9:50 AM   Care, Wound cleansed with;soap and water 5/13/2019  9:50 AM   Dressing Care, Wound open to air 5/13/2019  9:50 AM           Physical Therapy Education     Title: PT OT SLP Therapies (In Progress)     Topic: Physical Therapy (Done)     Point: Mobility training (Done)     Learning Progress Summary           Patient Acceptance, E,TB, VU,DU by CW at 5/13/2019  2:39 PM    Acceptance, E,TB, VU,DU by CW at 5/10/2019  3:28 PM    Acceptance, E,TB, VU,DU by CW at 5/9/2019  1:34 PM    Acceptance, E,TB, VU,DU by CW at 5/8/2019 10:50 AM    Acceptance, E,D, VU,DU by  at 5/7/2019 11:36 AM    Acceptance, E, NR by CA at 5/6/2019 10:55 AM                   Point: Home exercise program (Done)     Learning Progress Summary           Patient Acceptance, E,TB, VU,DU by CW at 5/13/2019  2:39 PM    Acceptance, E,TB, VU,DU by CW at 5/10/2019  3:28 PM    Acceptance, E,TB, VU,DU by CW at 5/9/2019  1:34 PM    Acceptance, E,TB, VU,DU by CW at 5/8/2019 10:50 AM    Acceptance, E,D, VU,DU by  at 5/7/2019 11:36 AM    Acceptance, E, NR by CA at 5/6/2019 10:55 AM                   Point: Body mechanics (Done)     Learning Progress Summary           Patient Acceptance, E,TB, VU,DU by CW at 5/13/2019  2:39 PM    Acceptance, E,TB, VU,DU by CW at 5/10/2019  3:28 PM    Acceptance, LUCINA HARPER VU, DU by CW at 5/9/2019  1:34 PM    Acceptance, LUCINA HARPER VU, DU by INOCENTE at 5/8/2019 10:50 AM    Acceptance, JOSE DE JESUS HARPER VU, DU by  at 5/7/2019 11:36 AM    Acceptance, ROSA MARIA HARPER by CA at 5/6/2019 10:55 AM                   Point: Precautions  (Done)     Learning Progress Summary           Patient Acceptance, E,TB, VU,DU by  at 5/13/2019  2:39 PM    Acceptance, E,TB, VU,DU by  at 5/10/2019  3:28 PM    Acceptance, E,TB, VU,DU by  at 5/9/2019  1:34 PM    Acceptance, E,TB, VU,DU by  at 5/8/2019 10:50 AM    Acceptance, E,D, VU,DU by  at 5/7/2019 11:36 AM    Acceptance, E, NR by CA at 5/6/2019 10:55 AM                               User Key     Initials Effective Dates Name Provider Type Discipline     03/07/18 -  Andi Sorensen PTA Physical Therapy Assistant PT     08/19/18 -  Henrietta Barker PTA Physical Therapy Assistant PT    CA 06/13/18 -  Ayanna Mar PT Physical Therapist PT                PT Recommendation and Plan  Anticipated Discharge Disposition (PT): skilled nursing facility  Therapy Frequency (PT Clinical Impression): daily  Outcome Summary/Treatment Plan (PT)  Anticipated Discharge Disposition (PT): skilled nursing facility  Plan of Care Reviewed With: patient  Progress: improving  Outcome Summary: Ptincreasing with bed moiblity since trap bar installed.  Pt continues to work [facility] and standing baance on the R due to L LE NWB  Outcome Measures     Row Name 05/13/19 1400 05/10/19 1500          How much help from another person do you currently need...    Turning from your back to your side while in flat bed without using bedrails?  3  -CW  3  -CW     Moving from lying on back to sitting on the side of a flat bed without bedrails?  3  -CW  3  -CW     Moving to and from a bed to a chair (including a wheelchair)?  1  -CW  1  -CW     Standing up from a chair using your arms (e.g., wheelchair, bedside chair)?  2  -CW  2  -CW     Climbing 3-5 steps with a railing?  1  -CW  1  -CW     To walk in hospital room?  1  -CW  1  -CW     AM-PAC 6 Clicks Score  11  -CW  11  -CW        Functional Assessment    Outcome Measure Options  AM-PAC 6 Clicks Basic Mobility (PT)  -CW  AM-PAC 6 Clicks Basic Mobility (PT)  -CW       User  Key  (r) = Recorded By, (t) = Taken By, (c) = Cosigned By    Initials Name Provider Type    Andi Delgado PTA Physical Therapy Assistant         Time Calculation:   PT Charges     Row Name 05/13/19 1441             Time Calculation    Start Time  1425  -CW      Stop Time  1441  -CW      Time Calculation (min)  16 min  -CW      PT Received On  05/13/19  -CW      PT - Next Appointment  05/14/19  -CW        User Key  (r) = Recorded By, (t) = Taken By, (c) = Cosigned By    Initials Name Provider Type    Andi Delgado PTA Physical Therapy Assistant        Therapy Charges for Today     Code Description Service Date Service Provider Modifiers Qty    54389387412 HC PT THER PROC EA 15 MIN 5/13/2019 Andi Sorensen PTA GP 1          PT G-Codes  Outcome Measure Options: AM-PAC 6 Clicks Basic Mobility (PT)  AM-PAC 6 Clicks Score: 11    Andi Sorensen PTA  5/13/2019

## 2019-05-13 NOTE — PROGRESS NOTES
"     LOS: 7 days   Primary Care Physician: Karen Red MD     Subjective   Feels okay.  Tried to stand earlier and left leg gave way.    Vital Signs  Body mass index is 41.69 kg/m².  Temp:  [97 °F (36.1 °C)-98.3 °F (36.8 °C)] 97.9 °F (36.6 °C)  Heart Rate:  [51-53] 53  Resp:  [18] 18  BP: (143-163)/(60-65) 153/63      Objective:  General Appearance:  In no acute distress (Obese).    Vital signs: (most recent): Blood pressure 153/63, pulse 53, temperature 97.9 °F (36.6 °C), temperature source Oral, resp. rate 18, height 185.4 cm (73\"), weight (!) 143 kg (316 lb), SpO2 99 %.    HEENT: (No JVD.  Neck supple.  Trachea midline)    Lungs:  He is not in respiratory distress.  No stridor.  There are decreased breath sounds.  No rales, wheezes or rhonchi.    Heart: Normal rate.  Regular rhythm.  Positive for murmur.  (Grade 3/6 systolic murmur best heard right upper sternal border)  Abdomen: Abdomen is soft and non-distended.  (Obese).  Bowel sounds are normal.   There is no abdominal tenderness.   There is no splenomegaly. There is no hepatomegaly.   Extremities: There is dependent edema.  (1-2+ at the ankles.  Brace on the left leg)  Neurological: Patient is alert.    Skin:  Warm and dry.          Results Review:    I reviewed the patient's new clinical results.    Results from last 7 days   Lab Units 05/11/19  0640 05/07/19  0639   WBC 10*3/mm3 5.70 6.15   HEMOGLOBIN g/dL 10.5* 10.4*   PLATELETS 10*3/mm3 192 174     Results from last 7 days   Lab Units 05/11/19  0640 05/07/19  0639   SODIUM mmol/L 140 137   POTASSIUM mmol/L 4.4 3.7   CHLORIDE mmol/L 105 105   CO2 mmol/L 25.2 24.2   BUN mg/dL 37* 39*   CREATININE mg/dL 1.32* 1.27   CALCIUM mg/dL 8.5* 8.6   GLUCOSE mg/dL 157* 130*         Hemoglobin A1C:  Lab Results   Component Value Date    HGBA1C 6.00 (H) 05/06/2019       Glucose Range:  Glucose   Date/Time Value Ref Range Status   05/13/2019 1556 227 (H) 70 - 130 mg/dL Final   05/13/2019 1204 195 (H) 70 - 130 mg/dL " Final   05/13/2019 0751 150 (H) 70 - 130 mg/dL Final   05/12/2019 2039 216 (H) 70 - 130 mg/dL Final   05/12/2019 1631 152 (H) 70 - 130 mg/dL Final   05/12/2019 1142 203 (H) 70 - 130 mg/dL Final       No results found for: [id number]    Lab Results   Component Value Date    TSH 4.320 (H) 08/01/2018       Assessment & Plan      Medication Review: Yes    Active Hospital Problems    Diagnosis  POA   • **Rupture of left quadriceps tendon [S76.112A]  Yes   • Constipation [K59.00]  No   • Fall [W19.XXXA]  Yes   • Non-traumatic rhabdomyolysis [M62.82]  Yes   • S/P CABG x 2 [Z95.1]  Not Applicable   • CKD (chronic kidney disease) stage 3, GFR 30-59 ml/min (CMS/[facility]) [N18.3]  Yes   • H/O aortic valve replacement with porcine valve [Z95.3]  Not Applicable   • Essential hypertension [I10]  Yes   • Type 2 diabetes mellitus with circulatory disorder, without long-term current use of insulin (CMS/[facility]) [E11.59]  Yes   • SHASTA (obstructive sleep apnea) [G47.33]  Yes      Resolved Hospital Problems   No resolved problems to display.       Assessment/Plan  1.  Ruptured left quadriceps tendon.  4 OR tomorrow.  I discussed earlier with Felecia Chávez from orthopedics.  Cardiology and pulmonary have okayed him for surgery  2.  Diabetes mellitus type 2 with hyperglycemia.  On metformin and glipizide at home.  A1c is okay.  Sliding scale for now.  Restart oral medications in the next day or 2.  3.  Sleep apnea.  He is on auto CPAP here.  Continue incentive spirometry  4.  Chronic kidney disease stage III.  Recheck in a.m.  5.  Acute blood loss anemia.  He was 12.4 a week ago but 11.9 February 2019.  Recheck in a.m. with usual studies.  Probably also component of chronic disease  6.  Chronic kidney disease stage III.  Baseline creatinine appears to be around 1.5 with GFR just under 50    Ninfa Ibrahim MD  05/13/19  4:17 PM

## 2019-05-13 NOTE — PROGRESS NOTES
[facility]  [phone]  Sergo Driscoll MD    Subjective:  LOS: 7    He denies complaints.  Uses a CPAP regularly at home and follows with Dr. Fuentes.    Objective   Vital Signs past 24hrs    Temp range: Temp (24hrs), Av.8 °F (36.6 °C), Min:97 °F (36.1 °C), Max:98.6 °F (37 °C)    BP range: BP: (143-159)/(60-67) 159/65  Pulse range: Heart Rate:  [51-53] 53  Resp rate range: Resp:  [16-18] 18    Device (Oxygen Therapy): room air   Oxygen range:SpO2:  [94 %-99 %] 94 %      (!) 143 kg (316 lb); Body mass index is 41.69 kg/m².    Intake/Output Summary (Last 24 hours) at 2019 1201  Last data filed at 2019 0500  Gross per 24 hour   Intake 120 ml   Output 425 ml   Net -305 ml       Physical Exam  Results Review:    I have reviewed the laboratory and imaging data since the last note by LPC physician.  My annotations are noted in assessment and plan.    Medication Review:  I have reviewed the current MAR.  My annotations are noted in assessment and plan.       Plan   PCCM Problems  1. Severe SHASTA with AHI of 60  2. Severe sleep and hypoxemia with desaturation down to 66% on room air  3. History of trapped lung status post decortication with no other respiratory issues  4. Left quadriceps tendon ruptures for surgery  5. Preoperative pulmonary clearance, no contraindication from the pulmonary standpoint  6. Coronary artery disease status post CABG  7. Type 2 diabetes mellitus  8. Obesity  9. Chronic kidney disease stage III    Plan of Treatment  Continue with hospital CPAP machine.  Await surgery tomorrow afternoon.  No respiratory issues identified at this time.  Usual precautions for sleep apnea with surgery.  We will see again tomorrow after surgery etc.    Sergo Driscoll MD  19  12:01 PM    Part of this note may be an electronic transcription/translation of spoken language to printed text using the Dragon Dictation System.

## 2019-05-13 NOTE — PLAN OF CARE
Problem: Patient Care Overview  Goal: Plan of Care Review  Outcome: Ongoing (interventions implemented as appropriate)   05/13/19 6564   Coping/Psychosocial   Plan of Care Reviewed With patient   Plan of Care Review   Progress improving   OTHER   Outcome Summary Pt in good spirits today. Anxious and eager for surgery tomorrow. Added ABD pad to inside of left knee, where brace was causing excoriation. With this added layer of padding and Joshi's representative, the brace was placed back on pt and he states it is much more comfortable now. Turning pt Q2 hours and providing anitifungal ointment to skin folds per orders.        Problem: Skin Injury Risk (Adult)  Goal: Identify Related Risk Factors and Signs and Symptoms  Outcome: Ongoing (interventions implemented as appropriate)

## 2019-05-14 ENCOUNTER — ANESTHESIA (OUTPATIENT)
Dept: PERIOP | Facility: HOSPITAL | Age: 74
End: 2019-05-14

## 2019-05-14 ENCOUNTER — ANESTHESIA EVENT (OUTPATIENT)
Dept: PERIOP | Facility: HOSPITAL | Age: 74
End: 2019-05-14

## 2019-05-14 LAB
ANION GAP SERPL CALCULATED.3IONS-SCNC: 8 MMOL/L
BUN BLD-MCNC: 33 MG/DL (ref 8–23)
BUN/CREAT SERPL: 25.6 (ref 7–25)
CALCIUM SPEC-SCNC: 8.8 MG/DL (ref 8.6–10.5)
CHLORIDE SERPL-SCNC: 100 MMOL/L (ref 98–107)
CO2 SERPL-SCNC: 26 MMOL/L (ref 22–29)
CREAT BLD-MCNC: 1.29 MG/DL (ref 0.76–1.27)
DEPRECATED RDW RBC AUTO: 46.8 FL (ref 37–54)
ERYTHROCYTE [DISTWIDTH] IN BLOOD BY AUTOMATED COUNT: 14.6 % (ref 12.3–15.4)
FOLATE SERPL-MCNC: 5.76 NG/ML (ref 4.78–24.2)
GFR SERPL CREATININE-BSD FRML MDRD: 54 ML/MIN/1.73
GLUCOSE BLD-MCNC: 135 MG/DL (ref 65–99)
GLUCOSE BLDC GLUCOMTR-MCNC: 128 MG/DL (ref 70–130)
GLUCOSE BLDC GLUCOMTR-MCNC: 132 MG/DL (ref 70–130)
GLUCOSE BLDC GLUCOMTR-MCNC: 136 MG/DL (ref 70–130)
GLUCOSE BLDC GLUCOMTR-MCNC: 162 MG/DL (ref 70–130)
GLUCOSE BLDC GLUCOMTR-MCNC: 180 MG/DL (ref 70–130)
HCT VFR BLD AUTO: 34.7 % (ref 37.5–51)
HGB BLD-MCNC: 11 G/DL (ref 13–17.7)
IRON 24H UR-MRATE: 58 MCG/DL (ref 59–158)
IRON SATN MFR SERPL: 17 % (ref 20–50)
MCH RBC QN AUTO: 28.1 PG (ref 26.6–33)
MCHC RBC AUTO-ENTMCNC: 31.7 G/DL (ref 31.5–35.7)
MCV RBC AUTO: 88.7 FL (ref 79–97)
PLATELET # BLD AUTO: 203 10*3/MM3 (ref 140–450)
PMV BLD AUTO: 10.3 FL (ref 6–12)
POTASSIUM BLD-SCNC: 4.2 MMOL/L (ref 3.5–5.2)
RBC # BLD AUTO: 3.91 10*6/MM3 (ref 4.14–5.8)
SODIUM BLD-SCNC: 134 MMOL/L (ref 136–145)
TIBC SERPL-MCNC: 340 MCG/DL (ref 298–536)
TRANSFERRIN SERPL-MCNC: 228 MG/DL (ref 200–360)
TSH SERPL DL<=0.05 MIU/L-ACNC: 3.04 MIU/ML (ref 0.27–4.2)
VIT B12 BLD-MCNC: >2000 PG/ML (ref 211–946)
WBC NRBC COR # BLD: 5.31 10*3/MM3 (ref 3.4–10.8)

## 2019-05-14 PROCEDURE — 0LQM0ZZ REPAIR LEFT UPPER LEG TENDON, OPEN APPROACH: ICD-10-PCS | Performed by: ORTHOPAEDIC SURGERY

## 2019-05-14 PROCEDURE — 99231 SBSQ HOSP IP/OBS SF/LOW 25: CPT | Performed by: NURSE PRACTITIONER

## 2019-05-14 PROCEDURE — 25010000002 PROPOFOL 10 MG/ML EMULSION: Performed by: ANESTHESIOLOGY

## 2019-05-14 PROCEDURE — 25010000002 VANCOMYCIN 1 G RECONSTITUTED SOLUTION 1 EACH VIAL: Performed by: ORTHOPAEDIC SURGERY

## 2019-05-14 PROCEDURE — 25010000002 HYDROMORPHONE PER 4 MG: Performed by: ANESTHESIOLOGY

## 2019-05-14 PROCEDURE — 27385 REPAIR OF THIGH MUSCLE: CPT | Performed by: NURSE PRACTITIONER

## 2019-05-14 PROCEDURE — 25010000002 ROPIVACAINE PER 1 MG: Performed by: ANESTHESIOLOGY

## 2019-05-14 PROCEDURE — 25010000002 FENTANYL CITRATE (PF) 100 MCG/2ML SOLUTION: Performed by: ANESTHESIOLOGY

## 2019-05-14 PROCEDURE — 27385 REPAIR OF THIGH MUSCLE: CPT | Performed by: ORTHOPAEDIC SURGERY

## 2019-05-14 PROCEDURE — 25010000002 ONDANSETRON PER 1 MG: Performed by: ANESTHESIOLOGY

## 2019-05-14 PROCEDURE — 84466 ASSAY OF TRANSFERRIN: CPT | Performed by: INTERNAL MEDICINE

## 2019-05-14 PROCEDURE — 82962 GLUCOSE BLOOD TEST: CPT

## 2019-05-14 PROCEDURE — 25010000002 FENTANYL CITRATE (PF) 100 MCG/2ML SOLUTION

## 2019-05-14 PROCEDURE — 25010000002 VANCOMYCIN 10 G RECONSTITUTED SOLUTION: Performed by: ORTHOPAEDIC SURGERY

## 2019-05-14 PROCEDURE — 82607 VITAMIN B-12: CPT | Performed by: INTERNAL MEDICINE

## 2019-05-14 PROCEDURE — 25010000002 SUCCINYLCHOLINE PER 20 MG: Performed by: ANESTHESIOLOGY

## 2019-05-14 PROCEDURE — 80048 BASIC METABOLIC PNL TOTAL CA: CPT | Performed by: INTERNAL MEDICINE

## 2019-05-14 PROCEDURE — 84443 ASSAY THYROID STIM HORMONE: CPT | Performed by: INTERNAL MEDICINE

## 2019-05-14 PROCEDURE — 82746 ASSAY OF FOLIC ACID SERUM: CPT | Performed by: INTERNAL MEDICINE

## 2019-05-14 PROCEDURE — 85027 COMPLETE CBC AUTOMATED: CPT | Performed by: INTERNAL MEDICINE

## 2019-05-14 PROCEDURE — 83540 ASSAY OF IRON: CPT | Performed by: INTERNAL MEDICINE

## 2019-05-14 PROCEDURE — L1830 KO IMMOB CANVAS LONG PRE OTS: HCPCS | Performed by: ORTHOPAEDIC SURGERY

## 2019-05-14 PROCEDURE — 63710000001 INSULIN LISPRO (HUMAN) PER 5 UNITS: Performed by: NURSE PRACTITIONER

## 2019-05-14 DEVICE — SUT FW #2 W/TPR NDL 1/2 CIR 38IN 97CM 26.5MM BLU: Type: IMPLANTABLE DEVICE | Status: FUNCTIONAL

## 2019-05-14 DEVICE — SUT FW 5 W .5 CIR CUT NDL 48M AR7211: Type: IMPLANTABLE DEVICE | Status: FUNCTIONAL

## 2019-05-14 RX ORDER — ONDANSETRON 2 MG/ML
4 INJECTION INTRAMUSCULAR; INTRAVENOUS EVERY 6 HOURS PRN
Status: DISCONTINUED | OUTPATIENT
Start: 2019-05-14 | End: 2019-05-19 | Stop reason: HOSPADM

## 2019-05-14 RX ORDER — MIDAZOLAM HYDROCHLORIDE 1 MG/ML
1 INJECTION INTRAMUSCULAR; INTRAVENOUS
Status: DISCONTINUED | OUTPATIENT
Start: 2019-05-14 | End: 2019-05-14 | Stop reason: HOSPADM

## 2019-05-14 RX ORDER — SODIUM CHLORIDE, SODIUM LACTATE, POTASSIUM CHLORIDE, CALCIUM CHLORIDE 600; 310; 30; 20 MG/100ML; MG/100ML; MG/100ML; MG/100ML
100 INJECTION, SOLUTION INTRAVENOUS CONTINUOUS
Status: DISCONTINUED | OUTPATIENT
Start: 2019-05-14 | End: 2019-05-15

## 2019-05-14 RX ORDER — ONDANSETRON 4 MG/1
4 TABLET, FILM COATED ORAL EVERY 6 HOURS PRN
Status: DISCONTINUED | OUTPATIENT
Start: 2019-05-14 | End: 2019-05-19 | Stop reason: HOSPADM

## 2019-05-14 RX ORDER — HYDROCODONE BITARTRATE AND ACETAMINOPHEN 7.5; 325 MG/1; MG/1
1 TABLET ORAL EVERY 4 HOURS PRN
Status: DISCONTINUED | OUTPATIENT
Start: 2019-05-14 | End: 2019-05-19 | Stop reason: HOSPADM

## 2019-05-14 RX ORDER — HYDROMORPHONE HYDROCHLORIDE 1 MG/ML
0.5 INJECTION, SOLUTION INTRAMUSCULAR; INTRAVENOUS; SUBCUTANEOUS
Status: DISCONTINUED | OUTPATIENT
Start: 2019-05-14 | End: 2019-05-19 | Stop reason: HOSPADM

## 2019-05-14 RX ORDER — DIPHENHYDRAMINE HYDROCHLORIDE 50 MG/ML
12.5 INJECTION INTRAMUSCULAR; INTRAVENOUS
Status: DISCONTINUED | OUTPATIENT
Start: 2019-05-14 | End: 2019-05-14 | Stop reason: HOSPADM

## 2019-05-14 RX ORDER — NALOXONE HCL 0.4 MG/ML
0.2 VIAL (ML) INJECTION AS NEEDED
Status: DISCONTINUED | OUTPATIENT
Start: 2019-05-14 | End: 2019-05-14 | Stop reason: HOSPADM

## 2019-05-14 RX ORDER — PROPOFOL 10 MG/ML
VIAL (ML) INTRAVENOUS AS NEEDED
Status: DISCONTINUED | OUTPATIENT
Start: 2019-05-14 | End: 2019-05-14 | Stop reason: SURG

## 2019-05-14 RX ORDER — MIDAZOLAM HYDROCHLORIDE 1 MG/ML
2 INJECTION INTRAMUSCULAR; INTRAVENOUS
Status: DISCONTINUED | OUTPATIENT
Start: 2019-05-14 | End: 2019-05-14 | Stop reason: HOSPADM

## 2019-05-14 RX ORDER — FENTANYL CITRATE 50 UG/ML
INJECTION, SOLUTION INTRAMUSCULAR; INTRAVENOUS
Status: COMPLETED
Start: 2019-05-14 | End: 2019-05-14

## 2019-05-14 RX ORDER — SODIUM CHLORIDE, SODIUM LACTATE, POTASSIUM CHLORIDE, CALCIUM CHLORIDE 600; 310; 30; 20 MG/100ML; MG/100ML; MG/100ML; MG/100ML
9 INJECTION, SOLUTION INTRAVENOUS CONTINUOUS
Status: DISCONTINUED | OUTPATIENT
Start: 2019-05-14 | End: 2019-05-15

## 2019-05-14 RX ORDER — HYDRALAZINE HYDROCHLORIDE 20 MG/ML
5 INJECTION INTRAMUSCULAR; INTRAVENOUS
Status: DISCONTINUED | OUTPATIENT
Start: 2019-05-14 | End: 2019-05-14 | Stop reason: HOSPADM

## 2019-05-14 RX ORDER — NALOXONE HCL 0.4 MG/ML
0.1 VIAL (ML) INJECTION
Status: DISCONTINUED | OUTPATIENT
Start: 2019-05-14 | End: 2019-05-19 | Stop reason: HOSPADM

## 2019-05-14 RX ORDER — OXYCODONE AND ACETAMINOPHEN 7.5; 325 MG/1; MG/1
1 TABLET ORAL ONCE AS NEEDED
Status: DISCONTINUED | OUTPATIENT
Start: 2019-05-14 | End: 2019-05-14 | Stop reason: HOSPADM

## 2019-05-14 RX ORDER — FENTANYL CITRATE 50 UG/ML
25 INJECTION, SOLUTION INTRAMUSCULAR; INTRAVENOUS
Status: DISCONTINUED | OUTPATIENT
Start: 2019-05-14 | End: 2019-05-14 | Stop reason: HOSPADM

## 2019-05-14 RX ORDER — HYDROCODONE BITARTRATE AND ACETAMINOPHEN 7.5; 325 MG/1; MG/1
1 TABLET ORAL ONCE AS NEEDED
Status: DISCONTINUED | OUTPATIENT
Start: 2019-05-14 | End: 2019-05-14 | Stop reason: HOSPADM

## 2019-05-14 RX ORDER — ONDANSETRON 2 MG/ML
INJECTION INTRAMUSCULAR; INTRAVENOUS AS NEEDED
Status: DISCONTINUED | OUTPATIENT
Start: 2019-05-14 | End: 2019-05-14 | Stop reason: SURG

## 2019-05-14 RX ORDER — SODIUM CHLORIDE 0.9 % (FLUSH) 0.9 %
1-10 SYRINGE (ML) INJECTION AS NEEDED
Status: DISCONTINUED | OUTPATIENT
Start: 2019-05-14 | End: 2019-05-14 | Stop reason: HOSPADM

## 2019-05-14 RX ORDER — ACETAMINOPHEN 325 MG/1
650 TABLET ORAL ONCE AS NEEDED
Status: DISCONTINUED | OUTPATIENT
Start: 2019-05-14 | End: 2019-05-14 | Stop reason: HOSPADM

## 2019-05-14 RX ORDER — FENTANYL CITRATE 50 UG/ML
INJECTION, SOLUTION INTRAMUSCULAR; INTRAVENOUS AS NEEDED
Status: DISCONTINUED | OUTPATIENT
Start: 2019-05-14 | End: 2019-05-14 | Stop reason: SURG

## 2019-05-14 RX ORDER — HYDROMORPHONE HYDROCHLORIDE 1 MG/ML
0.25 INJECTION, SOLUTION INTRAMUSCULAR; INTRAVENOUS; SUBCUTANEOUS
Status: DISCONTINUED | OUTPATIENT
Start: 2019-05-14 | End: 2019-05-14 | Stop reason: HOSPADM

## 2019-05-14 RX ORDER — ROPIVACAINE HYDROCHLORIDE 5 MG/ML
INJECTION, SOLUTION EPIDURAL; INFILTRATION; PERINEURAL
Status: COMPLETED | OUTPATIENT
Start: 2019-05-14 | End: 2019-05-14

## 2019-05-14 RX ORDER — ONDANSETRON 2 MG/ML
4 INJECTION INTRAMUSCULAR; INTRAVENOUS ONCE AS NEEDED
Status: DISCONTINUED | OUTPATIENT
Start: 2019-05-14 | End: 2019-05-14 | Stop reason: HOSPADM

## 2019-05-14 RX ORDER — FLUMAZENIL 0.1 MG/ML
0.2 INJECTION INTRAVENOUS AS NEEDED
Status: DISCONTINUED | OUTPATIENT
Start: 2019-05-14 | End: 2019-05-14 | Stop reason: HOSPADM

## 2019-05-14 RX ORDER — DOCUSATE SODIUM 100 MG/1
100 CAPSULE, LIQUID FILLED ORAL 2 TIMES DAILY
Status: DISCONTINUED | OUTPATIENT
Start: 2019-05-14 | End: 2019-05-15

## 2019-05-14 RX ORDER — GLYCOPYRROLATE 0.2 MG/ML
INJECTION INTRAMUSCULAR; INTRAVENOUS AS NEEDED
Status: DISCONTINUED | OUTPATIENT
Start: 2019-05-14 | End: 2019-05-14 | Stop reason: SURG

## 2019-05-14 RX ORDER — MAGNESIUM HYDROXIDE 1200 MG/15ML
LIQUID ORAL AS NEEDED
Status: DISCONTINUED | OUTPATIENT
Start: 2019-05-14 | End: 2019-05-14 | Stop reason: HOSPADM

## 2019-05-14 RX ORDER — DIPHENHYDRAMINE HCL 25 MG
25 CAPSULE ORAL
Status: DISCONTINUED | OUTPATIENT
Start: 2019-05-14 | End: 2019-05-14 | Stop reason: HOSPADM

## 2019-05-14 RX ORDER — LIDOCAINE HYDROCHLORIDE 10 MG/ML
0.5 INJECTION, SOLUTION EPIDURAL; INFILTRATION; INTRACAUDAL; PERINEURAL ONCE AS NEEDED
Status: DISCONTINUED | OUTPATIENT
Start: 2019-05-14 | End: 2019-05-14 | Stop reason: HOSPADM

## 2019-05-14 RX ORDER — LIDOCAINE HYDROCHLORIDE 20 MG/ML
INJECTION, SOLUTION INFILTRATION; PERINEURAL AS NEEDED
Status: DISCONTINUED | OUTPATIENT
Start: 2019-05-14 | End: 2019-05-14 | Stop reason: SURG

## 2019-05-14 RX ORDER — FAMOTIDINE 10 MG/ML
20 INJECTION, SOLUTION INTRAVENOUS ONCE
Status: COMPLETED | OUTPATIENT
Start: 2019-05-14 | End: 2019-05-14

## 2019-05-14 RX ORDER — LABETALOL HYDROCHLORIDE 5 MG/ML
5 INJECTION, SOLUTION INTRAVENOUS
Status: DISCONTINUED | OUTPATIENT
Start: 2019-05-14 | End: 2019-05-14 | Stop reason: HOSPADM

## 2019-05-14 RX ORDER — SUCCINYLCHOLINE CHLORIDE 20 MG/ML
INJECTION INTRAMUSCULAR; INTRAVENOUS AS NEEDED
Status: DISCONTINUED | OUTPATIENT
Start: 2019-05-14 | End: 2019-05-14 | Stop reason: SURG

## 2019-05-14 RX ORDER — FENTANYL CITRATE 50 UG/ML
50 INJECTION, SOLUTION INTRAMUSCULAR; INTRAVENOUS
Status: DISCONTINUED | OUTPATIENT
Start: 2019-05-14 | End: 2019-05-14 | Stop reason: HOSPADM

## 2019-05-14 RX ORDER — EPHEDRINE SULFATE 50 MG/ML
5 INJECTION, SOLUTION INTRAVENOUS ONCE AS NEEDED
Status: DISCONTINUED | OUTPATIENT
Start: 2019-05-14 | End: 2019-05-14 | Stop reason: HOSPADM

## 2019-05-14 RX ORDER — HYDROCODONE BITARTRATE AND ACETAMINOPHEN 7.5; 325 MG/1; MG/1
2 TABLET ORAL EVERY 4 HOURS PRN
Status: DISCONTINUED | OUTPATIENT
Start: 2019-05-14 | End: 2019-05-19 | Stop reason: HOSPADM

## 2019-05-14 RX ADMIN — ONDANSETRON 4 MG: 2 INJECTION INTRAMUSCULAR; INTRAVENOUS at 16:51

## 2019-05-14 RX ADMIN — HYDROMORPHONE HYDROCHLORIDE 0.25 MG: 1 INJECTION, SOLUTION INTRAMUSCULAR; INTRAVENOUS; SUBCUTANEOUS at 18:10

## 2019-05-14 RX ADMIN — GLYCOPYRROLATE 0.2 MCG: 0.2 INJECTION INTRAMUSCULAR; INTRAVENOUS at 16:03

## 2019-05-14 RX ADMIN — SODIUM CHLORIDE, POTASSIUM CHLORIDE, SODIUM LACTATE AND CALCIUM CHLORIDE: 600; 310; 30; 20 INJECTION, SOLUTION INTRAVENOUS at 15:38

## 2019-05-14 RX ADMIN — AMLODIPINE BESYLATE 10 MG: 10 TABLET ORAL at 08:41

## 2019-05-14 RX ADMIN — PROPOFOL 150 MG: 10 INJECTION, EMULSION INTRAVENOUS at 15:46

## 2019-05-14 RX ADMIN — FAMOTIDINE 20 MG: 10 INJECTION INTRAVENOUS at 13:29

## 2019-05-14 RX ADMIN — FENTANYL CITRATE 50 MCG: 50 INJECTION INTRAMUSCULAR; INTRAVENOUS at 17:25

## 2019-05-14 RX ADMIN — FENTANYL CITRATE 25 MCG: 50 INJECTION, SOLUTION INTRAMUSCULAR; INTRAVENOUS at 18:05

## 2019-05-14 RX ADMIN — MICONAZOLE NITRATE: 2 OINTMENT TOPICAL at 20:42

## 2019-05-14 RX ADMIN — SODIUM CHLORIDE, POTASSIUM CHLORIDE, SODIUM LACTATE AND CALCIUM CHLORIDE 100 ML/HR: 600; 310; 30; 20 INJECTION, SOLUTION INTRAVENOUS at 20:42

## 2019-05-14 RX ADMIN — SENNOSIDES AND DOCUSATE SODIUM 2 TABLET: 8.6; 5 TABLET ORAL at 22:36

## 2019-05-14 RX ADMIN — MICONAZOLE NITRATE: 2 OINTMENT TOPICAL at 08:42

## 2019-05-14 RX ADMIN — LIDOCAINE HYDROCHLORIDE 50 MG: 20 INJECTION, SOLUTION INFILTRATION; PERINEURAL at 15:46

## 2019-05-14 RX ADMIN — SODIUM CHLORIDE, POTASSIUM CHLORIDE, SODIUM LACTATE AND CALCIUM CHLORIDE 9 ML/HR: 600; 310; 30; 20 INJECTION, SOLUTION INTRAVENOUS at 17:35

## 2019-05-14 RX ADMIN — PROPOFOL 30 MG: 10 INJECTION, EMULSION INTRAVENOUS at 16:55

## 2019-05-14 RX ADMIN — INSULIN LISPRO 2 UNITS: 100 INJECTION, SOLUTION INTRAVENOUS; SUBCUTANEOUS at 08:41

## 2019-05-14 RX ADMIN — HYDROCODONE BITARTRATE AND ACETAMINOPHEN 2 TABLET: 7.5; 325 TABLET ORAL at 22:36

## 2019-05-14 RX ADMIN — ROPIVACAINE HYDROCHLORIDE 30 ML: 5 INJECTION, SOLUTION EPIDURAL; INFILTRATION; PERINEURAL at 13:46

## 2019-05-14 RX ADMIN — FENTANYL CITRATE 25 MCG: 50 INJECTION, SOLUTION INTRAMUSCULAR; INTRAVENOUS at 17:45

## 2019-05-14 RX ADMIN — FENTANYL CITRATE 25 MCG: 50 INJECTION, SOLUTION INTRAMUSCULAR; INTRAVENOUS at 17:32

## 2019-05-14 RX ADMIN — SODIUM CHLORIDE, PRESERVATIVE FREE 3 ML: 5 INJECTION INTRAVENOUS at 08:43

## 2019-05-14 RX ADMIN — SUCCINYLCHOLINE CHLORIDE 120 MG: 20 INJECTION, SOLUTION INTRAMUSCULAR; INTRAVENOUS; PARENTERAL at 15:46

## 2019-05-14 RX ADMIN — VANCOMYCIN HYDROCHLORIDE 2250 MG: 10 INJECTION, POWDER, LYOPHILIZED, FOR SOLUTION INTRAVENOUS at 21:04

## 2019-05-14 RX ADMIN — FENTANYL CITRATE 25 MCG: 50 INJECTION, SOLUTION INTRAMUSCULAR; INTRAVENOUS at 18:15

## 2019-05-14 RX ADMIN — ATORVASTATIN CALCIUM 40 MG: 20 TABLET, FILM COATED ORAL at 20:42

## 2019-05-14 RX ADMIN — HYDROMORPHONE HYDROCHLORIDE 0.25 MG: 1 INJECTION, SOLUTION INTRAMUSCULAR; INTRAVENOUS; SUBCUTANEOUS at 17:53

## 2019-05-14 RX ADMIN — VANCOMYCIN HYDROCHLORIDE 2250 MG: 1 INJECTION, POWDER, LYOPHILIZED, FOR SOLUTION INTRAVENOUS at 13:37

## 2019-05-14 RX ADMIN — FENTANYL CITRATE 25 MCG: 50 INJECTION INTRAMUSCULAR; INTRAVENOUS at 16:01

## 2019-05-14 RX ADMIN — ONDANSETRON 4 MG: 4 TABLET, FILM COATED ORAL at 22:36

## 2019-05-14 RX ADMIN — ZOLPIDEM TARTRATE 5 MG: 5 TABLET ORAL at 23:46

## 2019-05-14 RX ADMIN — SODIUM CHLORIDE, PRESERVATIVE FREE 3 ML: 5 INJECTION INTRAVENOUS at 21:00

## 2019-05-14 RX ADMIN — HYDROCODONE BITARTRATE AND ACETAMINOPHEN 2 TABLET: 7.5; 325 TABLET ORAL at 18:27

## 2019-05-14 RX ADMIN — ATENOLOL 25 MG: 25 TABLET ORAL at 08:41

## 2019-05-14 RX ADMIN — SODIUM CHLORIDE, POTASSIUM CHLORIDE, SODIUM LACTATE AND CALCIUM CHLORIDE 9 ML/HR: 600; 310; 30; 20 INJECTION, SOLUTION INTRAVENOUS at 13:30

## 2019-05-14 RX ADMIN — FENTANYL CITRATE 25 MCG: 50 INJECTION INTRAMUSCULAR; INTRAVENOUS at 15:46

## 2019-05-14 RX ADMIN — PROPOFOL 50 MG: 10 INJECTION, EMULSION INTRAVENOUS at 15:50

## 2019-05-14 RX ADMIN — PROPOFOL 30 MG: 10 INJECTION, EMULSION INTRAVENOUS at 17:02

## 2019-05-14 RX ADMIN — EPHEDRINE SULFATE 5 MG: 50 INJECTION INTRAMUSCULAR; INTRAVENOUS; SUBCUTANEOUS at 16:30

## 2019-05-14 RX ADMIN — INSULIN LISPRO 2 UNITS: 100 INJECTION, SOLUTION INTRAVENOUS; SUBCUTANEOUS at 22:36

## 2019-05-14 RX ADMIN — DOCUSATE SODIUM 100 MG: 100 CAPSULE, LIQUID FILLED ORAL at 20:42

## 2019-05-14 RX ADMIN — EPHEDRINE SULFATE 5 MG: 50 INJECTION INTRAMUSCULAR; INTRAVENOUS; SUBCUTANEOUS at 16:07

## 2019-05-14 RX ADMIN — PROPOFOL 30 MG: 10 INJECTION, EMULSION INTRAVENOUS at 17:08

## 2019-05-14 NOTE — PROGRESS NOTES
Continued Stay Note  [facility]     Patient Name: Rock Maciel Jr.  MRN: [id number]  Today's Date: 5/14/2019    Admit Date: 5/5/2019    Discharge Plan     Row Name 05/14/19 1422       Plan    Plan  [facility] pending [facility] pre-cert. Packet in office.    Patient/Family in Agreement with Plan  yes    Plan Comments  Currently in OR. Plans dc to [facility] pending [facility] pre-cert. CCP will follow for additional post-op needs.         Discharge Codes    No documentation.             Vera Germain RN

## 2019-05-14 NOTE — ANESTHESIA PROCEDURE NOTES
Airway  Urgency: elective    Airway not difficult    General Information and Staff    Patient location during procedure: OR  Anesthesiologist: Laurita Jones MD    Indications and Patient Condition  Indications for airway management: airway protection    Preoxygenated: yes  MILS maintained throughout  Mask difficulty assessment: 1 - vent by mask    Final Airway Details  Final airway type: endotracheal airway      Successful airway: ETT  Cuffed: yes   Successful intubation technique: direct laryngoscopy  Facilitating devices/methods: anterior pressure/BURP and Bougie  Endotracheal tube insertion site: oral  Blade: Magaly  Blade size: 4  ETT size (mm): 7.5  Cormack-Lehane Classification: grade III - view of epiglottis only  Placement verified by: chest auscultation and capnometry   Measured from: lips  Number of attempts at approach: 2

## 2019-05-14 NOTE — ANESTHESIA PREPROCEDURE EVALUATION
Anesthesia Evaluation     Patient summary reviewed and Nursing notes reviewed   NPO Solid Status: > 8 hours  NPO Liquid Status: > 2 hours           Airway   Mallampati: II  TM distance: >3 FB  Neck ROM: full  Dental - normal exam     Pulmonary - normal exam   (+) sleep apnea on CPAP,   Cardiovascular - normal exam    (+) hypertension, valvular problems/murmurs, CAD, dysrhythmias, PVD, hyperlipidemia,  carotid artery disease      Neuro/Psych- negative ROS  GI/Hepatic/Renal/Endo    (+) morbid obesity,  diabetes mellitus,     Musculoskeletal (-) negative ROS    Abdominal    Substance History - negative use     OB/GYN negative ob/gyn ROS         Other                        Anesthesia Plan    ASA 4     general with block     Anesthetic plan, all risks, benefits, and alternatives have been provided, discussed and informed consent has been obtained with: patient.

## 2019-05-14 NOTE — PROGRESS NOTES
[facility]  Cardiology Progress Note    Patient Identification:  Name: Rock Maciel Jr.  Age: 73 y.o.  Sex: male  :  1945  MRN: [id number]                 Follow Up / Chief Complaint: Cardiac clearance      Interval History: Left quad tendon scheduled for today.  Blood pressure remains elevated,  bradycardia improved.            Subjective: Patient was leg pain with movement, but denies chest pain, tightness, and shortness of breath.         Objective:    Past Medical History:  Past Medical History:   Diagnosis Date   • Allergic rhinitis    • Bronchitis    • Coronary artery disease    • Diabetes mellitus (CMS/[facility])    • DM (diabetes mellitus) (CMS/[facility])    • Encounter for annual health examination 2014    Annual Health Assessment   • Gout    • Hiatal hernia    • History of MRSA infection     RIGHT FOOT    • Hyperlipidemia    • Hypertension    • Murmur    • Pneumothorax on right    • Sleep apnea     pt wears CPAP at night   • Wellness examination 2015    Annual Wellness Visit     Past Surgical History:  Past Surgical History:   Procedure Laterality Date   • ARTERY SURGERY Bilateral     carotid   • CARDIAC CATHETERIZATION N/A 2018    Procedure: Left Heart Cath;  Surgeon: Jo Tonye MD;  Location: [facility] INVASIVE LOCATION;  Service: Cardiovascular   • CARDIAC CATHETERIZATION N/A 2018    Procedure: Coronary angiography;  Surgeon: Jo Toney MD;  Location: [facility] INVASIVE LOCATION;  Service: Cardiovascular   • CAROTID ENDARTERECTOMY Bilateral    • COLONOSCOPY     • CORONARY ARTERY BYPASS GRAFT WITH AORTIC VALVE REPAIR/REPLACEMENT N/A 2018    Procedure: INTRAOPERATIVE ALEX, MIDLINE STERNOTOMY, CORONARY ARTERY BYPASS GRAFTING X 3 USING ENDOSCOPICALLY HARVESTED LEFT GREATER SAPHENOUS VEIN,  AORTIC VALVE REPLACEMENT USING 25MM LOPEZ II ULTRA PORCINE VALVE, PRP;  Surgeon: Phong Posey MD;  Location: [facility] OR;  Service:  Cardiothoracic   • FOOT SURGERY Right 2010    5th digit removal   • FOOT SURGERY Left 2011    1 digit removed   • THORACENTESIS Right 11/21/2016   • THORACOSCOPY Right 5/8/2017    Procedure: BRONCHOSCOPY, RIGHT VAT,  TOTAL DECORTICATION RIGHT LUNG, PLEURAL BX, PLACEMENT SUBPLEURAL PAIN CAATHETERS X2;  Surgeon: Donald Orlando III, MD;  Location: [facility] OR;  Service:    • TONSILECTOMY, ADENOIDECTOMY, BILATERAL MYRINGOTOMY AND TUBES          Social History:   Social History     Tobacco Use   • Smoking status: Never Smoker   • Smokeless tobacco: Never Used   Substance Use Topics   • Alcohol use: Yes     Comment: either one glass wine or one glass liquor per  day      Family History:  Family History   Problem Relation Age of Onset   • Hypertension Father           Allergies:  Allergies   Allergen Reactions   • Ceclor [Cefaclor] Rash     Scheduled Meds:    amLODIPine 10 mg Daily   atenolol 25 mg Daily   atorvastatin 40 mg Nightly   insulin lispro 0-7 Units 4x Daily With Meals & Nightly   miconazole nitrate  Q12H   sennosides-docusate sodium 2 tablet BID   sodium chloride 3 mL Q12H     I have reviewed the patient's recent medical history and current medications.      INTAKE AND OUTPUT:    Intake/Output Summary (Last 24 hours) at 5/14/2019 1114  Last data filed at 5/14/2019 0907  Gross per 24 hour   Intake 740 ml   Output 1275 ml   Net -535 ml       Review of Systems:   GI: Denies abdominal pain, nausea, vomiting, and diarrhea  Cardiac: Denies chest pain, tightness, and palpitations  Pulmonary: Shortness of breath and cough    Constitutional:  Temp:  [96.8 °F (36 °C)-98.1 °F (36.7 °C)] 97.2 °F (36.2 °C)  Heart Rate:  [51-62] 53  Resp:  [16-18] 16  BP: (135-163)/(62-70) 150/63    Physical Exam:   General: Appears in no acute distress; resting comfortably in bed eyes: EOM normal and no conjunctival drainage  HEENT: No JVD.  Thyroid not visibly enlarged.  No mucosal pallor or cyanosis.     Respiratory: Respirations  regular and unlabored at rest.  BBS with good air entry anteriorly and laterally.  No crackles, rubs, or wheezes noted.     Cardiovascular: 1 S2 regular rate and rhythm.  No murmur, rub, or gallop.  DP/PT pulses 1+.  Trace - 1+ pretibial pitting edema   Gastrointestinal: Abdomen soft, round, and nontender.  Bowel sounds present.  No ascites.    Musculoskeletal: MARSHALL x4. No abnormal movements-amputated LE digits noted  Extremities: No digital clubbing or cyanosis  Skin: Warm and dry to touch.  Excoriation of right knee from brace noted    Neuro: AAO x3 CN II-XII grossly intact  Psych: And affect normal, pleasant, and cooperative.            Cardiographics  Telemetry:   unavailable    EC/7 SR rate 50s-talisha, RBBB known-similar to prior tracing        2018 SR rate 70s RBBB            Echocardiogram:       Interpretation Summary     · Left ventricular systolic function is normal. Calculated EF = 64.0%. Estimated EF was in agreement with the calculated EF. Estimated EF = 64%. Normal left ventricular cavity size noted. All left ventricular wall segments contract normally. Left ventricular wall thickness is consistent with mild concentric hypertrophy. Left ventricular diastolic dysfunction is noted (grade II w/high LAP) consistent with pseudonormalization.  · Left atrial volume is borderline increased.  · There is a prosthetic aortic valve. No aortic valve stenosis is present. There is a bioprosthetic valve present. Hemodynamically significant regurgitation is present. There is moderate prosthetic aortic valve regurgitation. Cannot tell from the study if this is paravalvular transvalvular in origin..  · Severe MAC is present. There is a nodular appearance to the mitral valve leaflets. Cannot rule out nodular sclerosis versus vegetative lesions.. There is severe bileaflet mitral valve thickening present. Mild mitral valve regurgitation is present.            2018  Interpretation Summary     · Limited echo  performed  · Suboptimal scan  · LV function appears to be normal  · No pericardial effusion         5/2018  Interpretation Summary     · Left atrial cavity size is moderately dilated.  · Mild mitral valve regurgitation is present  · Mild tricuspid valve regurgitation is present.  · Calculated EF = 68%.  · There is no evidence of pericardial effusion.  · There is calcification of the aortic valve.  · Mild to moderate aortic valve stenosis is present.       Stress test  9/2018  Interpretation Summary        · Equivocal ECG evidence of myocardial ischemia.Negative clinical evidence of myocardial ischemia. Findings consistent with an equivocal ECG stress test. Submaximal Stress Test.  · . Asymptomatic for chest pain       Cardiac catheterization  7/2018  Conclusion        · Successful right and left coronary angiogram and LV pressures  · Normal left main  · Minimum diffuse left anterior descending irregularity with midportion 40-50% stenosis  · Circumflex artery has OM ostial 70% distal circumflex 60% stenosis  · Right coronary artery dominant with mid and distal portion 70% stenosis  · Severe aortic stenosis with gradient 60 mmHg               Lab Review   Results from last 7 days   Lab Units 05/08/19  0554 05/07/19  1744   TROPONIN T ng/mL <0.010 <0.010     Results from last 7 days   Lab Units 05/08/19  0554   MAGNESIUM mg/dL 2.6*     Results from last 7 days   Lab Units 05/14/19  0451   SODIUM mmol/L 134*   POTASSIUM mmol/L 4.2   BUN mg/dL 33*   CREATININE mg/dL 1.29*   CALCIUM mg/dL 8.8       Results from last 7 days   Lab Units 05/14/19  0451 05/11/19  0640   WBC 10*3/mm3 5.31 5.70   HEMOGLOBIN g/dL 11.0* 10.5*   HEMATOCRIT % 34.7* 33.2*   PLATELETS 10*3/mm3 203 192               Assessment/plan    1.  Cardiac clearance- scheduled for today.      2.  CAD- Continues to be without chest pain, tightness, and shortness of breath.     3.  Hypertension- BP elevated, 150/63.  Patient is in fair amount of pain with any  "movement, elevating blood pressure.  Waiting to titrate or add blood pressure meds until after surgery on his pain may be better controlled      4.  Left knee pain-Surgery scheduled for today.     5. Bradycardia-Improved with reduction of BB.         ekg in am    )5/14/2019  ZEINA Fox/Transcription:   \"Dictated utilizing Dragon dictation\".   "

## 2019-05-14 NOTE — ANESTHESIA PROCEDURE NOTES
Peripheral Block      Patient reassessed immediately prior to procedure    Patient location during procedure: holding area  Reason for block: at surgeon's request and post-op pain management  Performed by  Anesthesiologist: Trace Stuart MD  Preanesthetic Checklist  Completed: patient identified, site marked, surgical consent, pre-op evaluation, timeout performed, IV checked, risks and benefits discussed and monitors and equipment checked  Prep:  Sterile barriers:cap, gloves and mask  Prep: ChloraPrep  Patient monitoring: blood pressure monitoring, continuous pulse oximetry and EKG  Procedure  Sedation:yes  Performed under: local infiltration  Guidance:ultrasound guided  ULTRASOUND INTERPRETATION.  Using ultrasound guidance a 21 G gauge needle was placed in close proximity to the femoral nerve, at which point, under ultrasound guidance anesthetic was injected in the area of the nerve and spread of the anesthesia was seen on ultrasound in close proximity thereto.  There were no abnormalities seen on ultrasound; a digital image was taken; and the patient tolerated the procedure with no complications. Images:still images obtained    Laterality:left  Block Type:femoral and adductor canal block  Injection Technique:single-shot  Needle Type:echogenic  Resistance on Injection: none  Medications Used: ropivacaine (NAROPIN) 0.5 % injection, 30 mL  Post Assessment  Injection Assessment: negative aspiration for heme, no paresthesia on injection and incremental injection  Patient Tolerance:comfortable throughout block  Complications:no  Additional Notes  Ultrasound Interpretation:  Using ultrasound guidance the needle was placed in close proximity to the femoral nerve and anesthetic was injected in the area of the femoral nerve and spread of the anesthetic was seen on ultrasound in close proximity thereto.  There were no abnormalities seen on ultrasound; a digital image was taken; and the patient tolerated the  procedure with no complications.    Block placed for postoperative pain control per surgeon request.

## 2019-05-14 NOTE — ANESTHESIA POSTPROCEDURE EVALUATION
"Patient: Rock Maciel Jr.    Procedure Summary     Date:  05/14/19 Room / Location:  [facility] OR  / [facility] MAIN OR    Anesthesia Start:  1539 Anesthesia Stop:  1726    Procedure:  Left QUADRICEPS TENDON REPAIR (Left Thigh) Diagnosis:      Surgeon:  Camilo Hunter MD Provider:  Laurita Jones MD    Anesthesia Type:  general with block ASA Status:  4          Anesthesia Type: general with block  Last vitals  BP   150/58 (05/14/19 1800)   Temp   36.9 °C (98.5 °F) (05/14/19 1725)   Pulse   54 (05/14/19 1800)   Resp   16 (05/14/19 1800)     SpO2   97 % (05/14/19 1800)     Post Anesthesia Care and Evaluation    Patient location during evaluation: bedside  Patient participation: complete - patient participated  Level of consciousness: awake and alert  Pain management: adequate  Airway patency: patent  Anesthetic complications: No anesthetic complications    Cardiovascular status: acceptable  Respiratory status: acceptable  Hydration status: acceptable    Comments: /58   Pulse 54   Temp 36.9 °C (98.5 °F) (Oral)   Resp 16   Ht 185.4 cm (73\")   Wt (!) 143 kg (316 lb)   SpO2 97%   BMI 41.69 kg/m²           "

## 2019-05-14 NOTE — BRIEF OP NOTE
QUADRICEPS TENDON REPAIR  Progress Note    Rock MENDOZA Raheem Burgos  5/5/2019 - 5/14/2019    Pre-op Diagnosis:   Left quad tendon rupture       Post-Op Diagnosis Codes:   same    Procedure/CPT® Codes:      Procedure(s):  Left QUADRICEPS TENDON REPAIR    Surgeon(s):  Camilo Hunter MD    Anesthesia: General    Staff:   Assistant: Bridgett Gonzales APRN    Estimated Blood Loss: minimal    Urine Voided: 850 mL    Specimens:                None          Drains:      Findings: see dictation    Complications: none      Camilo Hunter MD     Date: 5/14/2019  Time: 3:58 PM

## 2019-05-15 LAB
ANION GAP SERPL CALCULATED.3IONS-SCNC: 6.2 MMOL/L
BUN BLD-MCNC: 33 MG/DL (ref 8–23)
BUN/CREAT SERPL: 22.9 (ref 7–25)
CALCIUM SPEC-SCNC: 8.3 MG/DL (ref 8.6–10.5)
CHLORIDE SERPL-SCNC: 101 MMOL/L (ref 98–107)
CO2 SERPL-SCNC: 28.8 MMOL/L (ref 22–29)
CREAT BLD-MCNC: 1.44 MG/DL (ref 0.76–1.27)
DEPRECATED RDW RBC AUTO: 51.9 FL (ref 37–54)
ERYTHROCYTE [DISTWIDTH] IN BLOOD BY AUTOMATED COUNT: 15 % (ref 12.3–15.4)
GFR SERPL CREATININE-BSD FRML MDRD: 48 ML/MIN/1.73
GLUCOSE BLD-MCNC: 156 MG/DL (ref 65–99)
GLUCOSE BLDC GLUCOMTR-MCNC: 147 MG/DL (ref 70–130)
GLUCOSE BLDC GLUCOMTR-MCNC: 202 MG/DL (ref 70–130)
GLUCOSE BLDC GLUCOMTR-MCNC: 207 MG/DL (ref 70–130)
GLUCOSE BLDC GLUCOMTR-MCNC: 222 MG/DL (ref 70–130)
HCT VFR BLD AUTO: 31.7 % (ref 37.5–51)
HGB BLD-MCNC: 9.6 G/DL (ref 13–17.7)
MCH RBC QN AUTO: 29 PG (ref 26.6–33)
MCHC RBC AUTO-ENTMCNC: 30.3 G/DL (ref 31.5–35.7)
MCV RBC AUTO: 95.8 FL (ref 79–97)
PLATELET # BLD AUTO: 172 10*3/MM3 (ref 140–450)
PMV BLD AUTO: 10.3 FL (ref 6–12)
POTASSIUM BLD-SCNC: 4.4 MMOL/L (ref 3.5–5.2)
RBC # BLD AUTO: 3.31 10*6/MM3 (ref 4.14–5.8)
SODIUM BLD-SCNC: 136 MMOL/L (ref 136–145)
WBC NRBC COR # BLD: 6.08 10*3/MM3 (ref 3.4–10.8)

## 2019-05-15 PROCEDURE — 93010 ELECTROCARDIOGRAM REPORT: CPT | Performed by: INTERNAL MEDICINE

## 2019-05-15 PROCEDURE — 94799 UNLISTED PULMONARY SVC/PX: CPT

## 2019-05-15 PROCEDURE — 99232 SBSQ HOSP IP/OBS MODERATE 35: CPT | Performed by: INTERNAL MEDICINE

## 2019-05-15 PROCEDURE — 97110 THERAPEUTIC EXERCISES: CPT

## 2019-05-15 PROCEDURE — 80048 BASIC METABOLIC PNL TOTAL CA: CPT | Performed by: INTERNAL MEDICINE

## 2019-05-15 PROCEDURE — 93005 ELECTROCARDIOGRAM TRACING: CPT | Performed by: NURSE PRACTITIONER

## 2019-05-15 PROCEDURE — 82962 GLUCOSE BLOOD TEST: CPT

## 2019-05-15 PROCEDURE — 85027 COMPLETE CBC AUTOMATED: CPT | Performed by: INTERNAL MEDICINE

## 2019-05-15 PROCEDURE — 63710000001 INSULIN LISPRO (HUMAN) PER 5 UNITS: Performed by: NURSE PRACTITIONER

## 2019-05-15 RX ORDER — LACTULOSE 10 G/15ML
10 SOLUTION ORAL 2 TIMES DAILY PRN
Status: DISCONTINUED | OUTPATIENT
Start: 2019-05-15 | End: 2019-05-19 | Stop reason: HOSPADM

## 2019-05-15 RX ORDER — GLIPIZIDE 5 MG/1
5 TABLET ORAL
Status: DISCONTINUED | OUTPATIENT
Start: 2019-05-15 | End: 2019-05-19 | Stop reason: HOSPADM

## 2019-05-15 RX ADMIN — INSULIN LISPRO 3 UNITS: 100 INJECTION, SOLUTION INTRAVENOUS; SUBCUTANEOUS at 21:54

## 2019-05-15 RX ADMIN — ZOLPIDEM TARTRATE 5 MG: 5 TABLET ORAL at 23:08

## 2019-05-15 RX ADMIN — INSULIN LISPRO 3 UNITS: 100 INJECTION, SOLUTION INTRAVENOUS; SUBCUTANEOUS at 13:02

## 2019-05-15 RX ADMIN — HYDROCODONE BITARTRATE AND ACETAMINOPHEN 2 TABLET: 7.5; 325 TABLET ORAL at 02:33

## 2019-05-15 RX ADMIN — SODIUM CHLORIDE, PRESERVATIVE FREE 3 ML: 5 INJECTION INTRAVENOUS at 08:21

## 2019-05-15 RX ADMIN — ATENOLOL 25 MG: 25 TABLET ORAL at 08:14

## 2019-05-15 RX ADMIN — ATORVASTATIN CALCIUM 40 MG: 20 TABLET, FILM COATED ORAL at 21:54

## 2019-05-15 RX ADMIN — MICONAZOLE NITRATE: 2 OINTMENT TOPICAL at 21:50

## 2019-05-15 RX ADMIN — SENNOSIDES AND DOCUSATE SODIUM 2 TABLET: 8.6; 5 TABLET ORAL at 08:14

## 2019-05-15 RX ADMIN — AMLODIPINE BESYLATE 10 MG: 10 TABLET ORAL at 08:14

## 2019-05-15 RX ADMIN — HYDROCODONE BITARTRATE AND ACETAMINOPHEN 2 TABLET: 7.5; 325 TABLET ORAL at 12:11

## 2019-05-15 RX ADMIN — HYDROCODONE BITARTRATE AND ACETAMINOPHEN 2 TABLET: 7.5; 325 TABLET ORAL at 06:18

## 2019-05-15 RX ADMIN — INSULIN LISPRO 3 UNITS: 100 INJECTION, SOLUTION INTRAVENOUS; SUBCUTANEOUS at 17:50

## 2019-05-15 RX ADMIN — SENNOSIDES AND DOCUSATE SODIUM 2 TABLET: 8.6; 5 TABLET ORAL at 21:54

## 2019-05-15 RX ADMIN — MICONAZOLE NITRATE 1 APPLICATION: 2 OINTMENT TOPICAL at 08:20

## 2019-05-15 RX ADMIN — HYDROCODONE BITARTRATE AND ACETAMINOPHEN 2 TABLET: 7.5; 325 TABLET ORAL at 23:08

## 2019-05-15 NOTE — PROGRESS NOTES
I have seen and evaluated the patient this morning.  Mr. Maciel reports that his pain was fairly significant overnight but he is doing well at present.  Denies any current problems or issues.    Vitals:    05/14/19 2235 05/15/19 0018 05/15/19 0350 05/15/19 0806   BP: 135/60  130/57 147/61   BP Location: Left arm  Left arm Left arm   Patient Position:   Lying Lying   Pulse: 64  52 69   Resp: 16  16 16   Temp: 98 °F (36.7 °C)   98.1 °F (36.7 °C)   TempSrc: Oral   Oral   SpO2: 98% 98% 99%    Weight:       Height:         Dressings are clean and dry.  The brace is in place and fits well.  Calf is soft.  He can plantarflex and dorsiflex his foot.  Sensation is intact.    Assessment: Postoperative day 1 status post left quadriceps tendon repair    Plan: With his pre-existing foot deformity, ambulation is going to be difficult for him.  I would let him put weight on the leg with the condition that he use a walker and keep his leg in extension at all times with the brace on.  If he cannot comply with those restrictions then I do not want him putting weight on the left leg because he could jeopardize the repair.  I am fine with him resuming all of his preoperative anticoagulation.  He is waiting on rehab placement.  He wants to go to [facility].  If discharged, I need to see him back in 2 weeks.    Camilo Hunter MD

## 2019-05-15 NOTE — PLAN OF CARE
Problem: Patient Care Overview  Goal: Plan of Care Review  Outcome: Ongoing (interventions implemented as appropriate)   05/15/19 1553   Coping/Psychosocial   Plan of Care Reviewed With patient   Plan of Care Review   Progress improving   OTHER   Outcome Summary Patient is POD 1 L quad tendon repair. Evaled by PT today, able to stand x3 today and needs assist with bed mobility. Unable to get up to chair. KI on at all times in extension, patient is able to WBAT with KI in place and locked in extension. Pain is managed with po meds, positioning and ice. Vitals are stable and voiding function is intact. BG monitored and managed with SSI, SHASTA managed with hospital cpap. Patient to d/c to snf when stable, awaiting precert.        Problem: Fall Risk (Adult)  Goal: Absence of Fall  Outcome: Ongoing (interventions implemented as appropriate)   05/15/19 1553   Fall Risk (Adult)   Absence of Fall achieves outcome       Problem: Pain, Acute (Adult)  Goal: Acceptable Pain Control/Comfort Level  Outcome: Ongoing (interventions implemented as appropriate)   05/15/19 1553   Pain, Acute (Adult)   Acceptable Pain Control/Comfort Level making progress toward outcome       Problem: Skin Injury Risk (Adult)  Goal: Skin Health and Integrity  Outcome: Ongoing (interventions implemented as appropriate)   05/15/19 1553   Skin Injury Risk (Adult)   Skin Health and Integrity making progress toward outcome       Problem: Surgery Nonspecified (Adult)  Goal: Signs and Symptoms of Listed Potential Problems Will be Absent, Minimized or Managed (Surgery Nonspecified)  Outcome: Ongoing (interventions implemented as appropriate)   05/15/19 1553   Goal/Outcome Evaluation   Problems Assessed (Surgery) all   Problems Present (Surgery) pain     Goal: Anesthesia/Sedation Recovery  Outcome: Outcome(s) achieved Date Met: 05/15/19   05/15/19 1553   Goal/Outcome Evaluation   Anesthesia/Sedation Recovery recovered to baseline

## 2019-05-15 NOTE — PROGRESS NOTES
[facility]  [phone]  Sergo Driscoll MD    Subjective:  LOS: 9    He denies complaints.  Uses a CPAP regularly at home and follows with Dr. Fuentes.    Objective   Vital Signs past 24hrs    Temp range: Temp (24hrs), Av.3 °F (36.8 °C), Min:98 °F (36.7 °C), Max:98.6 °F (37 °C)    BP range: BP: (130-159)/(57-68) 147/61  Pulse range: Heart Rate:  [5-74] 69  Resp rate range: Resp:  [14-16] 16    Device (Oxygen Therapy): CPAPFlow (L/min):  [2-4] 2  Oxygen range:SpO2:  [91 %-100 %] 99 %      (!) 143 kg (316 lb); Body mass index is 41.69 kg/m².    Intake/Output Summary (Last 24 hours) at 5/15/2019 1217  Last data filed at 5/15/2019 0620  Gross per 24 hour   Intake 3047 ml   Output 325 ml   Net 2722 ml       Physical Exam   Constitutional: He appears well-developed.   Cardiovascular: Normal rate and regular rhythm.   No murmur heard.  Pulmonary/Chest: He has no wheezes. He has no rales.   Abdominal: Soft. Bowel sounds are normal. There is no tenderness.   Musculoskeletal: He exhibits no edema.   Leg in cast   Neurological: He is alert.   Nursing note and vitals reviewed.    Results Review:    I have reviewed the laboratory and imaging data since the last note by LPC physician.  My annotations are noted in assessment and plan.    Medication Review:  I have reviewed the current MAR.  My annotations are noted in assessment and plan.      lactated ringers 9 mL/hr Last Rate: 9 mL/hr (19 1735)   lactated ringers 100 mL/hr Last Rate: 100 mL/hr (19)     Plan   PCCM Problems  1. Severe SHASTA with AHI of 60  2. Severe sleep and hypoxemia with desaturation down to 66% on room air  3. History of trapped lung status post decortication with no other respiratory issues  4. Left quadriceps tendon ruptures for surgery  5. Preoperative pulmonary clearance, no contraindication from the pulmonary standpoint  6. Coronary artery disease status post CABG  7. Type 2 diabetes mellitus  8. Obesity  9. Chronic  kidney disease stage III    Plan of Treatment  Continue with hospital CPAP machine.  Tolerated surgery without difficulty.  No respiratory issues identified at this time.  He will bring his own CPAP to rehab. We will sign off. Please call as needed.    Sergo Driscoll MD  05/15/19  12:17 PM    Part of this note may be an electronic transcription/translation of spoken language to printed text using the Dragon Dictation System.

## 2019-05-15 NOTE — THERAPY RE-EVALUATION
Acute Care - Physical Therapy ReEvaluation  [facility]     Patient Name: Rock Maciel Jr.  : 1945  MRN: [id number]  Today's Date: 5/15/2019  Onset of Illness/Injury or Date of Surgery: 19  Date of Referral to PT: 19  Referring Physician: Azra Fox APRN    Admit Date: 2019    Visit Dx:    ICD-10-CM ICD-9-CM   1. Fall, initial encounter W19.XXXA E888.9   2. Generalized weakness R53.1 780.79   3. Left leg pain M79.605 729.5   4. 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 (CMS/HCC)   • Chronic venous insufficiency   • Nonrheumatic aortic valve stenosis   • Carotid stenosis, asymptomatic   • Bradycardia   • Hyperlipidemia   • Bilateral carotid artery disease (CMS/HCC)   • Abnormal cardiovascular stress test   • Renal insufficiency   • H/O aortic valve replacement with porcine valve   • Charcot-Davida-Tooth disease-like deformity of foot   • Amputated toe of right foot (CMS/HCC)   • Fall   • Non-traumatic rhabdomyolysis   • S/P CABG x 2   • CKD (chronic kidney disease) stage 3, GFR 30-59 ml/min (CMS/HCC)   • Rupture of left quadriceps tendon   • Constipation       Therapy Treatment    Rehabilitation Treatment Summary     Row Name 05/15/19 1309             Treatment Time/Intention    Discipline  physical therapist  -MS      Document Type  re-evaluation  -MS      Subjective Information  no complaints  -MS      Mode of  Treatment  physical therapy  -MS      Patient/Family Observations  Resting in bed with HOB elevated, KI donned at time of arrival, NAD  -MS      Care Plan Review  patient/other agree to care plan  -MS      Therapy Frequency (PT Clinical Impression)  daily  -MS      Patient Effort  good  -MS      Existing Precautions/Restrictions  fall WBAT with KI and extension per MD  -MS      Recorded by [MS] Lorri Gallego, PT 05/15/19 1313      Row Name 05/15/19 1309             Vital Signs    O2 Delivery Pre Treatment  room air  -MS      Recorded by [MS] Lorri Gallego, PT 05/15/19 1313      Row Name 05/15/19 1309             Cognitive Assessment/Intervention- PT/OT    Orientation Status (Cognition)  oriented x 3  -MS      Follows Commands (Cognition)  WNL  -MS      Personal Safety Interventions  fall prevention program maintained;gait belt;nonskid shoes/slippers when out of bed  -MS      Recorded by [MS] Lorri Gallego, PT 05/15/19 1313      Row Name 05/15/19 1309             Bed Mobility Assessment/Treatment    Bed Mobility Assessment/Treatment  supine-sit;sit-supine  -MS      Supine-Sit [place] (Bed Mobility)  moderate assist (50% patient effort);maximum assist (25% patient effort);2 person assist;verbal cues;nonverbal cues (demo/gesture)  -MS      Sit-Supine [place] (Bed Mobility)  moderate assist (50% patient effort);maximum assist (25% patient effort);2 person assist;verbal cues;nonverbal cues (demo/gesture)  -MS      Bed Mobility, Safety Issues  decreased use of arms for pushing/pulling;decreased use of legs for bridging/pushing  -MS      Assistive Device (Bed Mobility)  bed rails;draw sheet;head of bed elevated  -MS      Comment (Bed Mobility)  Assist primarily with L LE  -MS      Recorded by [MS] Lorri Gallego, PT 05/15/19 1313      Row Name 05/15/19 1309             Transfer Assessment/Treatment    Transfer Assessment/Treatment  sit-stand transfer;stand-sit transfer  -MS      Comment  (Transfers)  3 standing trials- stood with UE support for up to 15s. Postrual cues required. Bed height elevated for mechanical advantage.  -MS      Recorded by [MS] Lorri Galelgo, PT 05/15/19 1313      Row Name 05/15/19 1309             Sit-Stand Transfer    Sit-Stand [place] (Transfers)  moderate assist (50% patient effort);2 person assist;verbal cues;nonverbal cues (demo/gesture)  -MS      Assistive Device (Sit-Stand Transfers)  bariatric;walker, front-wheeled  -MS      Recorded by [MS] Lorri Gallego, PT 05/15/19 1313      Row Name 05/15/19 1309             Stand-Sit Transfer    Stand-Sit [place] (Transfers)  minimum assist (75% patient effort);2 person assist;verbal cues;nonverbal cues (demo/gesture)  -MS      Assistive Device (Stand-Sit Transfers)  bariatric;walker, front-wheeled  -MS      Recorded by [MS] Lorri Gallego, PT 05/15/19 1313      Row Name 05/15/19 1309             Gait/Stairs Assessment/Training    [place] Level (Gait)  not tested;unable to assess  -MS      Recorded by [MS] Lorri Gallego, PT 05/15/19 1313      Row Name 05/15/19 1309             Static Sitting Balance    Level of [place] (Unsupported Sitting, Static Balance)  supervision  -MS      Sitting Position (Unsupported Sitting, Static Balance)  sitting on edge of bed  -MS      Time Able to Maintain Position (Unsupported Sitting, Static Balance)  more than 5 minutes  -MS      Recorded by [MS] Lorri Gallego, PT 05/15/19 1313      Row Name 05/15/19 1309             Static Standing Balance    Level of [place] (Supported Standing, Static Balance)  contact guard assist;minimal assist, 75% patient effort  -MS      Time Able to Maintain Position (Supported Standing, Static Balance)  1 to 2 minutes  -MS      Assistive Device Utilized (Supported Standing, Static Balance)  walker, rolling  -MS      Recorded by [MS] Lorri Gallego, PT 05/15/19 1313      Row Name 05/15/19 1309             Positioning  and Restraints    Pre-Treatment Position  in bed  -MS      Post Treatment Position  bed  -MS      In Bed  notified nsg;fowlers;call light within reach;encouraged to call for assist;L knee immobilizer  -MS      Recorded by [MS] Lorri Gallego, PT 05/15/19 1313      Row Name                Residual Limb Assessment 05/06/19 0900 other (see comments)    Residual Limb Assessment - Properties Group Date first assessed: 05/06/19 [LL] Time first assessed: 0900 [LL] Amputation Date: -- [LL], unknown  Location: other (see comments) [LL], Left great toe, Right small toes (4 &5)  Recorded by:  [LL] Ling Aleman RN 05/07/19 1218    Row Name                Wound 05/12/19 0900 Left anterior;upper thigh blisters    Wound - Properties Group Date first assessed: 05/12/19 [AE] Time first assessed: 0900 [AE] Present On Admission : picture taken [AE] Side: Left [AE] Orientation: anterior;upper [AE] Location: thigh [AE] Type: blisters [AE] Recorded by:  [AE] Nina Holman RN 05/12/19 1241    Row Name                Wound 05/14/19 1615 Left leg incision    Wound - Properties Group Date first assessed: 05/14/19 [MM] Time first assessed: 1615 [MM] Side: Left [MM] Location: leg [MM] Type: incision [MM] Recorded by:  [MM] Lorri Conley RN 05/14/19 1615    Row Name 05/15/19 1309             Plan of Care Review    Plan of Care Reviewed With  patient  -MS      Recorded by [MS] Lorri Gallego, PT 05/15/19 1313      Row Name 05/15/19 1309             Outcome Summary/Treatment Plan (PT)    Daily Summary of Progress (PT)  progress towards functional goals is fair  -MS      Anticipated Discharge Disposition (PT)  skilled nursing facility  -MS      Recorded by [MS] Lorri Gallego, PT 05/15/19 1313        User Key  (r) = Recorded By, (t) = Taken By, (c) = Cosigned By    Initials Name Effective Dates Discipline    Nina Higgins RN 09/07/17 -  Nurse    Lorri Mullen RN 06/16/16 -  Nurse    Lorri Oilvarez,  PT 03/04/19 -  PT    LL Ling Aleman RN 07/13/18 -  Nurse          Wound 05/12/19 0900 Left anterior;upper thigh blisters (Active)   Dressing Appearance dry;intact;no drainage 5/14/2019  8:03 PM   Closure NIKKI 5/14/2019  8:03 PM       Wound 05/14/19 1615 Left leg incision (Active)   Dressing Appearance dry;intact;no drainage 5/15/2019  8:14 AM   Closure NIKKI 5/15/2019  8:14 AM   Drainage Amount none 5/14/2019  7:28 PM   Dressing Care, Wound elastic bandage 5/14/2019  7:28 PM       Rehab Goal Summary     Row Name 05/15/19 1313             Physical Therapy Goals    Bed Mobility Goal Selection (PT)  bed mobility, PT goal 1  -MS      Transfer Goal Selection (PT)  transfer, PT goal 1  -MS      Gait Training Goal Selection (PT)  gait training, PT goal 1  -MS         Bed Mobility Goal 1 (PT)    Activity/Assistive Device (Bed Mobility Goal 1, PT)  bed mobility activities, all  -MS      [place] Level/Cues Needed (Bed Mobility Goal 1, PT)  minimum assist (75% or more patient effort)  -MS      Time Frame (Bed Mobility Goal 1, PT)  1 week  -MS      Progress/Outcomes (Bed Mobility Goal 1, PT)  goal ongoing  -MS         Transfer Goal 1 (PT)    Activity/Assistive Device (Transfer Goal 1, PT)  transfers, all;walker, rolling  -MS      [place] Level/Cues Needed (Transfer Goal 1, PT)  minimum assist (75% or more patient effort)  -MS      Time Frame (Transfer Goal 1, PT)  1 week  -MS      Progress/Outcome (Transfer Goal 1, PT)  goal ongoing  -MS         Gait Training Goal 1 (PT)    Activity/Assistive Device (Gait Training Goal 1, PT)  gait (walking locomotion);assistive device use  -MS      [place] Level (Gait Training Goal 1, PT)  minimum assist (75% or more patient effort)  -MS      Distance (Gait Goal 1, PT)  10  -MS      Time Frame (Gait Training Goal 1, PT)  1 week  -MS      Progress/Outcome (Gait Training Goal 1, PT)  goal ongoing  -MS        User Key  (r) = Recorded By, (t) = Taken By, (c) = Cosigned By     Initials Name Provider Type Discipline    MS Gallego Lorri EULALIA, PT Physical Therapist PT          Physical Therapy Education     Title: PT OT SLP Therapies (In Progress)     Topic: Physical Therapy (Done)     Point: Mobility training (Done)     Learning Progress Summary           Patient Acceptance, E, VU by MS at 5/15/2019  1:22 PM    Acceptance, E,TB, VU,DU by CW at 5/13/2019  2:39 PM    Acceptance, E,TB, VU,DU by CW at 5/10/2019  3:28 PM    Acceptance, E,TB, VU,DU by CW at 5/9/2019  1:34 PM    Acceptance, E,TB, VU,DU by CW at 5/8/2019 10:50 AM    Acceptance, E,D, VU,DU by  at 5/7/2019 11:36 AM    Acceptance, E, NR by CA at 5/6/2019 10:55 AM                   Point: Home exercise program (Done)     Learning Progress Summary           Patient Acceptance, E, VU by MS at 5/15/2019  1:22 PM    Acceptance, E,TB, VU,DU by CW at 5/13/2019  2:39 PM    Acceptance, E,TB, VU,DU by CW at 5/10/2019  3:28 PM    Acceptance, E,TB, VU,DU by CW at 5/9/2019  1:34 PM    Acceptance, E,TB, VU,DU by  at 5/8/2019 10:50 AM    Acceptance, E,D, VU,DU by  at 5/7/2019 11:36 AM    Acceptance, E, NR by CA at 5/6/2019 10:55 AM                   Point: Body mechanics (Done)     Learning Progress Summary           Patient Acceptance, E, VU by MS at 5/15/2019  1:22 PM    Acceptance, E,TB, VU,DU by CW at 5/13/2019  2:39 PM    Acceptance, E,TB, VU,DU by  at 5/10/2019  3:28 PM    Acceptance, E,TB, VU,DU by CW at 5/9/2019  1:34 PM    Acceptance, E,TB, VU,DU by  at 5/8/2019 10:50 AM    Acceptance, E,D, VU,DU by  at 5/7/2019 11:36 AM    Acceptance, E, NR by CA at 5/6/2019 10:55 AM                   Point: Precautions (Done)     Learning Progress Summary           Patient Acceptance, E, VU by MS at 5/15/2019  1:22 PM    Acceptance, E,TB, VU,DU by CW at 5/13/2019  2:39 PM    Acceptance, E,TB, VU,DU by CW at 5/10/2019  3:28 PM    Acceptance, E,TB, VU,DU by CW at 5/9/2019  1:34 PM    Acceptance, E,TB, VU,DU by CW at 5/8/2019 10:50 AM     Acceptance, JOSE DE JESUS HARPER, CARI,DU by  at 5/7/2019 11:36 AM    Acceptance, MEREDITH, NR by CA at 5/6/2019 10:55 AM                               User Key     Initials Effective Dates Name Provider Type Discipline    MS 03/04/19 -  Lorri Gallego, PT Physical Therapist PT     03/07/18 -  Andi Sorensen, PTA Physical Therapy Assistant PT     08/19/18 -  Henrietta Barker, GORDO Physical Therapy Assistant PT    CA 06/13/18 -  Ayanna Mar PT Physical Therapist PT                PT Recommendation and Plan  Anticipated Discharge Disposition (PT): skilled nursing facility  Therapy Frequency (PT Clinical Impression): daily  Outcome Summary/Treatment Plan (PT)  Daily Summary of Progress (PT): progress towards functional goals is fair  Anticipated Discharge Disposition (PT): skilled nursing facility  Plan of Care Reviewed With: patient  Outcome Summary: ReEvaluation as patient is now POD1 L quad tendon repair- WBAT with KI on in extension. Patient continues to require assist with bed mobility and tolerated 3 standing trials with use of RW. Goals updated and modified as appropriate. Will continue to advance as able- recommend SNF upon DC.  Outcome Measures     Row Name 05/15/19 1300 05/13/19 1400          How much help from another person do you currently need...    Turning from your back to your side while in flat bed without using bedrails?  3  -MS  3  -CW     Moving from lying on back to sitting on the side of a flat bed without bedrails?  2  -MS  3  -CW     Moving to and from a bed to a chair (including a wheelchair)?  1  -MS  1  -CW     Standing up from a chair using your arms (e.g., wheelchair, bedside chair)?  2  -MS  2  -CW     Climbing 3-5 steps with a railing?  1  -MS  1  -CW     To walk in hospital room?  1  -MS  1  -CW     AM-PAC 6 Clicks Score  10  -MS  11  -CW        Functional Assessment    Outcome Measure Options  AM-PAC 6 Clicks Basic Mobility (PT)  -MS  AM-PAC 6 Clicks Basic Mobility (PT)  -CW       User Key  (r)  = Recorded By, (t) = Taken By, (c) = Cosigned By    Initials Name Provider Type    MS GallegoLorri, PT Physical Therapist    Andi Delgado, PTA Physical Therapy Assistant         Time Calculation:   PT Charges     Row Name 05/15/19 1309             Time Calculation    Start Time  1143  -MS      Stop Time  1207  -MS      Time Calculation (min)  24 min  -MS      PT Received On  05/15/19  -MS      PT - Next Appointment  05/16/19  -MS      PT Goal Re-Cert Due Date  05/22/19  -MS        User Key  (r) = Recorded By, (t) = Taken By, (c) = Cosigned By    Initials Name Provider Type    MS Gallego Lorri ESTEBAN, PT Physical Therapist        Therapy Charges for Today     Code Description Service Date Service Provider Modifiers Qty    47074372025 HC PT THER PROC EA 15 MIN 5/15/2019 Lorri Gallego, PT GP 2    00305187076 HC PT THER SUPP EA 15 MIN 5/15/2019 Lorri Gallego, PT GP 2          PT G-Codes  Outcome Measure Options: AM-PAC 6 Clicks Basic Mobility (PT)  AM-PAC 6 Clicks Score: 10    Lorri Gallego PT  5/15/2019

## 2019-05-15 NOTE — PLAN OF CARE
Problem: Patient Care Overview  Goal: Plan of Care Review  Outcome: Ongoing (interventions implemented as appropriate)   05/15/19 0403   Coping/Psychosocial   Plan of Care Reviewed With patient   Plan of Care Review   Progress improving   OTHER   Outcome Summary client POD 0 Lt quad/tendon repain. VSS, pain control achieved with prn norco, denies any ponv. plans to d/c to SNF for rehab. discussed blood glucose monitoring r/t hx DM.      Goal: Individualization and Mutuality  Outcome: Ongoing (interventions implemented as appropriate)    Goal: Discharge Needs Assessment  Outcome: Ongoing (interventions implemented as appropriate)      Problem: Fall Risk (Adult)  Goal: Identify Related Risk Factors and Signs and Symptoms  Outcome: Outcome(s) achieved Date Met: 05/15/19    Goal: Absence of Fall  Outcome: Ongoing (interventions implemented as appropriate)      Problem: Pain, Acute (Adult)  Goal: Acceptable Pain Control/Comfort Level  Outcome: Ongoing (interventions implemented as appropriate)      Problem: Skin Injury Risk (Adult)  Goal: Identify Related Risk Factors and Signs and Symptoms  Outcome: Outcome(s) achieved Date Met: 05/15/19    Goal: Skin Health and Integrity  Outcome: Ongoing (interventions implemented as appropriate)      Problem: Surgery Nonspecified (Adult)  Goal: Signs and Symptoms of Listed Potential Problems Will be Absent, Minimized or Managed (Surgery Nonspecified)  Outcome: Ongoing (interventions implemented as appropriate)    Goal: Anesthesia/Sedation Recovery  Outcome: Ongoing (interventions implemented as appropriate)

## 2019-05-15 NOTE — PROGRESS NOTES
"     LOS: 8 days   Primary Care Physician: Karen Red MD     Subjective   I saw the patient in the recovery room.  He was awake and alert.  Leg was sore but otherwise he was okay    Vital Signs  Body mass index is 41.69 kg/m².  Temp:  [96.8 °F (36 °C)-98.6 °F (37 °C)] 98 °F (36.7 °C)  Heart Rate:  [5-74] 62  Resp:  [14-16] 16  BP: (135-159)/(58-70) 144/67      Objective:  General Appearance:  In no acute distress.    Vital signs: (most recent): Blood pressure 144/67, pulse 62, temperature 98 °F (36.7 °C), temperature source Oral, resp. rate 16, height 185.4 cm (73\"), weight (!) 143 kg (316 lb), SpO2 91 %.    Lungs:  He is not in respiratory distress.  No stridor.  There are decreased breath sounds.  No rales, wheezes or rhonchi.    Heart: Normal rate.  Regular rhythm.  No murmur.   Abdomen: Abdomen is soft.  (Obese).  Bowel sounds are normal.   There is no abdominal tenderness.   There is no splenomegaly. There is no hepatomegaly.   Extremities: There is dependent edema.  (Trace edema right ankle, left leg bandaged)  Neurological: Patient is alert.    Skin:  Warm.          Results Review:    I reviewed the patient's new clinical results.    Results from last 7 days   Lab Units 05/14/19  0451 05/11/19  0640   WBC 10*3/mm3 5.31 5.70   HEMOGLOBIN g/dL 11.0* 10.5*   PLATELETS 10*3/mm3 203 192     Results from last 7 days   Lab Units 05/14/19  0451 05/11/19  0640   SODIUM mmol/L 134* 140   POTASSIUM mmol/L 4.2 4.4   CHLORIDE mmol/L 100 105   CO2 mmol/L 26.0 25.2   BUN mg/dL 33* 37*   CREATININE mg/dL 1.29* 1.32*   CALCIUM mg/dL 8.8 8.5*   GLUCOSE mg/dL 135* 157*         Hemoglobin A1C:  Lab Results   Component Value Date    HGBA1C 6.00 (H) 05/06/2019       Glucose Range:  Glucose   Date/Time Value Ref Range Status   05/14/2019 1738 136 (H) 70 - 130 mg/dL Final   05/14/2019 1314 128 70 - 130 mg/dL Final   05/14/2019 1056 132 (H) 70 - 130 mg/dL Final   05/14/2019 0730 162 (H) 70 - 130 mg/dL Final   05/13/2019 2032 197 " (H) 70 - 130 mg/dL Final   05/13/2019 1556 227 (H) 70 - 130 mg/dL Final       Lab Results   Component Value Date    [id number] >2,000 (H) 05/14/2019       Lab Results   Component Value Date    TSH 3.040 05/14/2019       Assessment & Plan      Medication Review: Yes    Active Hospital Problems    Diagnosis  POA   • **Rupture of left quadriceps tendon [S76.112A]  Yes   • Constipation [K59.00]  No   • Fall [W19.XXXA]  Yes   • Non-traumatic rhabdomyolysis [M62.82]  Yes   • S/P CABG x 2 [Z95.1]  Not Applicable   • CKD (chronic kidney disease) stage 3, GFR 30-59 ml/min (CMS/HCC) [N18.3]  Yes   • H/O aortic valve replacement with porcine valve [Z95.3]  Not Applicable   • Essential hypertension [I10]  Yes   • Type 2 diabetes mellitus with circulatory disorder, without long-term current use of insulin (CMS/[facility]) [E11.59]  Yes   • SHASTA (obstructive sleep apnea) [G47.33]  Yes      Resolved Hospital Problems   No resolved problems to display.       Assessment/Plan  1.  Left quadriceps tendon rupture, status post repair today  2.  Diabetes mellitus type 2.  Continue sliding scale.  Restart 1 of his oral agents tomorrow (metformin/glipizide)  3.  Sleep apnea, continue auto CPAP  4.  Chronic kidney disease stage III, creatinine unchanged  5.  Acute blood loss anemia and anemia of chronic disease, hemoglobin improved today.  Expect some blood loss after surgery.  Recheck tomorrow    Ninfa Ibrahim MD  05/14/19  8:50 PM

## 2019-05-15 NOTE — PLAN OF CARE
Problem: Patient Care Overview  Goal: Plan of Care Review   05/15/19 1320   Coping/Psychosocial   Plan of Care Reviewed With patient   OTHER   Outcome Summary ReEvaluation as patient is now POD1 L quad tendon repair- WBAT with KI on in extension. Patient continues to require assist with bed mobility and tolerated 3 standing trials with use of RW. Goals updated and modified as appropriate. Will continue to advance as able- recommend SNF upon DC.

## 2019-05-15 NOTE — PROGRESS NOTES
"     LOS: 9 days   Primary Care Physician: Karen Red MD     Subjective   Could barely stand with PT secondary to pain.  No shortness of breath.  No chest pain    Vital Signs  Body mass index is 41.69 kg/m².  Temp:  [97.5 °F (36.4 °C)-98.6 °F (37 °C)] 97.5 °F (36.4 °C)  Heart Rate:  [52-69] 63  Resp:  [16] 16  BP: (130-158)/(57-67) 137/62      Objective:  General Appearance:  In no acute distress (Morbidly obese).    Vital signs: (most recent): Blood pressure 137/62, pulse 63, temperature 97.5 °F (36.4 °C), temperature source Oral, resp. rate 16, height 185.4 cm (73\"), weight (!) 143 kg (316 lb), SpO2 99 %.    Lungs:  He is not in respiratory distress.  No stridor.  There are decreased breath sounds.  No rales, wheezes or rhonchi.    Heart: Normal rate.  Regular rhythm.  No murmur.   Abdomen: Abdomen is soft and distended.  (Obese).  Bowel sounds are normal.   There is no abdominal tenderness.   There is no splenomegaly. There is no hepatomegaly.   Extremities: There is dependent edema.  (Trace -1+ edema right ankle, unchanged.  Left leg is bandaged)  Neurological: Patient is alert.    Skin:  Warm.          Results Review:    I reviewed the patient's new clinical results.    Results from last 7 days   Lab Units 05/15/19  0407 05/14/19  0451   WBC 10*3/mm3 6.08 5.31   HEMOGLOBIN g/dL 9.6* 11.0*   PLATELETS 10*3/mm3 172 203     Results from last 7 days   Lab Units 05/15/19  0659 05/14/19  0451   SODIUM mmol/L 136 134*   POTASSIUM mmol/L 4.4 4.2   CHLORIDE mmol/L 101 100   CO2 mmol/L 28.8 26.0   BUN mg/dL 33* 33*   CREATININE mg/dL 1.44* 1.29*   CALCIUM mg/dL 8.3* 8.8   GLUCOSE mg/dL 156* 135*         Hemoglobin A1C:  Lab Results   Component Value Date    HGBA1C 6.00 (H) 05/06/2019       Glucose Range:  Glucose   Date/Time Value Ref Range Status   05/15/2019 1623 202 (H) 70 - 130 mg/dL Final   05/15/2019 1053 222 (H) 70 - 130 mg/dL Final   05/15/2019 0616 147 (H) 70 - 130 mg/dL Final   05/14/2019 2142 180 (H) 70 - " 130 mg/dL Final   05/14/2019 1738 136 (H) 70 - 130 mg/dL Final   05/14/2019 1314 128 70 - 130 mg/dL Final       Lab Results   Component Value Date    [id number] >2,000 (H) 05/14/2019       Lab Results   Component Value Date    TSH 3.040 05/14/2019       Assessment & Plan      Medication Review: Yes    Active Hospital Problems    Diagnosis  POA   • **Rupture of left quadriceps tendon [S76.112A]  Yes   • Constipation [K59.00]  No   • Fall [W19.XXXA]  Yes   • Non-traumatic rhabdomyolysis [M62.82]  Yes   • S/P CABG x 2 [Z95.1]  Not Applicable   • CKD (chronic kidney disease) stage 3, GFR 30-59 ml/min (CMS/[facility]) [N18.3]  Yes   • H/O aortic valve replacement with porcine valve [Z95.3]  Not Applicable   • Essential hypertension [I10]  Yes   • Type 2 diabetes mellitus with circulatory disorder, without long-term current use of insulin (CMS/[facility]) [E11.59]  Yes   • SHASTA (obstructive sleep apnea) [G47.33]  Yes      Resolved Hospital Problems   No resolved problems to display.       Assessment/Plan  1.  Postop day 1 left quadricep tendon rupture repair.  Await precert for rehab.  Expected acute blood loss anemia, asymptomatic.  Also anemia of chronic disease.  2.  Diabetes mellitus type 2.  Will restart glipizide.  Wait on metformin.  Creatinine noted  3.  Chronic kidney disease stage III, some change. Recheck in am  4.  Sleep apnea, severe.  Continue CPAP  5.  Hypertension.  Continue Norvasc.  Blood pressures okay.    Ninfa Ibrahim MD  05/15/19  7:28 PM

## 2019-05-15 NOTE — PROGRESS NOTES
[facility]  Progress note: 5/15/2019    Patient Identification:  Name:Rock Maciel Jr.  Age:73 y.o.  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  No chest pain or shortness of breath.  Vitals stable.    Vital Signs:   Temp:  [97.2 °F (36.2 °C)-98.6 °F (37 °C)] 98 °F (36.7 °C)  Heart Rate:  [-] 52  Resp:  [-16] 16  BP: (130-159)/(57-68) 130/57    Intake/Output Summary (Last 24 hours) at 5/15/2019 0750  Last data filed at 5/15/2019 0620  Gross per 24 hour   Intake 3047 ml   Output 875 ml   Net 2172 ml       Physical Examination:    General Appearance No acute distress   Neck No adenopathy, supple, trachea midline, no thyromegaly, no carotid bruit, no JVD   Lungs Clear to auscultation,respirations regular, even and unlabored   Heart Regular rhythm and normal rate, normal S1 and S2, no murmur, no gallop, no rub, no click   Chest wall No abnormalities observed   Abdomen Normal bowel sounds, no masses, no hepatomegaly, soft   Extremities Moves all extremities well, no edema, no cyanosis, no redness   Neurological Alert and oriented x 3     Lab Review:  Personally reviewed the labs, radiology imaging and other cardiac procedures. Results from last 7 days   Lab Units 05/15/19  0659   SODIUM mmol/L 136   POTASSIUM mmol/L 4.4   CHLORIDE mmol/L 101   CO2 mmol/L 28.8   BUN mg/dL 33*   CREATININE mg/dL 1.44*   CALCIUM mg/dL 8.3*   GLUCOSE mg/dL 156*       Results from last 7 days   Lab Units 05/15/19  0407 19  0451 19  0640   WBC 10*3/mm3 6.08 5.31 5.70   HEMOGLOBIN g/dL 9.6* 11.0* 10.5*   HEMATOCRIT % 31.7* 34.7* 33.2*   PLATELETS 10*3/mm3 172 203 192       Medication Review:   Meds reviewed  Scheduled Meds:  amLODIPine 10 mg Oral Daily   atenolol 25 mg Oral Daily   atorvastatin 40 mg Oral Nightly   docusate sodium 100 mg Oral BID   insulin lispro 0-7 Units Subcutaneous  4x Daily With Meals & Nightly   miconazole nitrate  Topical Q12H   polyethylene glycol 17 g Oral Daily   sennosides-docusate sodium 2 tablet Oral BID   sodium chloride 3 mL Intravenous Q12H     Continuous Infusions:  lactated ringers 9 mL/hr Last Rate: 9 mL/hr (05/14/19 1735)   lactated ringers 100 mL/hr Last Rate: 100 mL/hr (05/14/19 2042)     I personally viewed and interpreted the patient's EKG/Telemetry data    Assessment and Plan  1.  Status post tendon repair.  Doing well postoperatively.  2.  Coronary artery disease with history of coronary artery bypass grafting in July 2018, no chest pain.  3.  Hx of aortic valve replacement with porcine valve and with moderate prosthetic valve regurgitation and no evidence of heart failure.  Would consider further outpatient evaluation and follow-up.  Tentatively plan on follow-up with Dr. Nixon in 4 to 6 weeks  4.  Hypertension  5.  Bradycardia, imporved with decrease beta-blocker use  6.  Obstructive sleep apnea  7.  Dyslipidemia    Mini Dsouza  5/15/67352:50 AM  25min spent in reviewing records, discussion and examination of the patient and discussion with other members of the patient's medical team.     Dictated utilizing Dragon dictation

## 2019-05-16 LAB
ANION GAP SERPL CALCULATED.3IONS-SCNC: 12.4 MMOL/L
BUN BLD-MCNC: 35 MG/DL (ref 8–23)
BUN/CREAT SERPL: 20.6 (ref 7–25)
CALCIUM SPEC-SCNC: 8.3 MG/DL (ref 8.6–10.5)
CHLORIDE SERPL-SCNC: 104 MMOL/L (ref 98–107)
CO2 SERPL-SCNC: 23.6 MMOL/L (ref 22–29)
CREAT BLD-MCNC: 1.7 MG/DL (ref 0.76–1.27)
DEPRECATED RDW RBC AUTO: 50.2 FL (ref 37–54)
ERYTHROCYTE [DISTWIDTH] IN BLOOD BY AUTOMATED COUNT: 14.9 % (ref 12.3–15.4)
GFR SERPL CREATININE-BSD FRML MDRD: 40 ML/MIN/1.73
GLUCOSE BLD-MCNC: 178 MG/DL (ref 65–99)
GLUCOSE BLDC GLUCOMTR-MCNC: 146 MG/DL (ref 70–130)
GLUCOSE BLDC GLUCOMTR-MCNC: 168 MG/DL (ref 70–130)
GLUCOSE BLDC GLUCOMTR-MCNC: 195 MG/DL (ref 70–130)
GLUCOSE BLDC GLUCOMTR-MCNC: 215 MG/DL (ref 70–130)
HCT VFR BLD AUTO: 30.3 % (ref 37.5–51)
HGB BLD-MCNC: 9.5 G/DL (ref 13–17.7)
MCH RBC QN AUTO: 29 PG (ref 26.6–33)
MCHC RBC AUTO-ENTMCNC: 31.4 G/DL (ref 31.5–35.7)
MCV RBC AUTO: 92.4 FL (ref 79–97)
PLATELET # BLD AUTO: 171 10*3/MM3 (ref 140–450)
PMV BLD AUTO: 10.6 FL (ref 6–12)
POTASSIUM BLD-SCNC: 4.5 MMOL/L (ref 3.5–5.2)
RBC # BLD AUTO: 3.28 10*6/MM3 (ref 4.14–5.8)
SODIUM BLD-SCNC: 140 MMOL/L (ref 136–145)
WBC NRBC COR # BLD: 5.61 10*3/MM3 (ref 3.4–10.8)

## 2019-05-16 PROCEDURE — 99231 SBSQ HOSP IP/OBS SF/LOW 25: CPT | Performed by: NURSE PRACTITIONER

## 2019-05-16 PROCEDURE — 82962 GLUCOSE BLOOD TEST: CPT

## 2019-05-16 PROCEDURE — 63710000001 INSULIN LISPRO (HUMAN) PER 5 UNITS: Performed by: NURSE PRACTITIONER

## 2019-05-16 PROCEDURE — 80048 BASIC METABOLIC PNL TOTAL CA: CPT | Performed by: INTERNAL MEDICINE

## 2019-05-16 PROCEDURE — 97530 THERAPEUTIC ACTIVITIES: CPT

## 2019-05-16 PROCEDURE — 85027 COMPLETE CBC AUTOMATED: CPT | Performed by: INTERNAL MEDICINE

## 2019-05-16 RX ADMIN — HYDROCODONE BITARTRATE AND ACETAMINOPHEN 2 TABLET: 7.5; 325 TABLET ORAL at 06:07

## 2019-05-16 RX ADMIN — GLIPIZIDE 5 MG: 5 TABLET ORAL at 18:17

## 2019-05-16 RX ADMIN — HYDROCODONE BITARTRATE AND ACETAMINOPHEN 2 TABLET: 7.5; 325 TABLET ORAL at 14:17

## 2019-05-16 RX ADMIN — SENNOSIDES AND DOCUSATE SODIUM 2 TABLET: 8.6; 5 TABLET ORAL at 08:49

## 2019-05-16 RX ADMIN — SODIUM CHLORIDE, PRESERVATIVE FREE 3 ML: 5 INJECTION INTRAVENOUS at 21:03

## 2019-05-16 RX ADMIN — MICONAZOLE NITRATE: 2 OINTMENT TOPICAL at 21:03

## 2019-05-16 RX ADMIN — AMLODIPINE BESYLATE 10 MG: 10 TABLET ORAL at 08:49

## 2019-05-16 RX ADMIN — HYDROCODONE BITARTRATE AND ACETAMINOPHEN 2 TABLET: 7.5; 325 TABLET ORAL at 18:17

## 2019-05-16 RX ADMIN — INSULIN LISPRO 2 UNITS: 100 INJECTION, SOLUTION INTRAVENOUS; SUBCUTANEOUS at 12:18

## 2019-05-16 RX ADMIN — ZOLPIDEM TARTRATE 5 MG: 5 TABLET ORAL at 23:40

## 2019-05-16 RX ADMIN — SENNOSIDES AND DOCUSATE SODIUM 2 TABLET: 8.6; 5 TABLET ORAL at 21:02

## 2019-05-16 RX ADMIN — ATENOLOL 25 MG: 25 TABLET ORAL at 08:49

## 2019-05-16 RX ADMIN — MICONAZOLE NITRATE: 2 OINTMENT TOPICAL at 08:50

## 2019-05-16 RX ADMIN — INSULIN LISPRO 3 UNITS: 100 INJECTION, SOLUTION INTRAVENOUS; SUBCUTANEOUS at 21:13

## 2019-05-16 RX ADMIN — INSULIN LISPRO 2 UNITS: 100 INJECTION, SOLUTION INTRAVENOUS; SUBCUTANEOUS at 08:49

## 2019-05-16 RX ADMIN — ATORVASTATIN CALCIUM 40 MG: 20 TABLET, FILM COATED ORAL at 21:02

## 2019-05-16 RX ADMIN — HYDROCODONE BITARTRATE AND ACETAMINOPHEN 2 TABLET: 7.5; 325 TABLET ORAL at 10:16

## 2019-05-16 RX ADMIN — POLYETHYLENE GLYCOL 3350 17 G: 17 POWDER, FOR SOLUTION ORAL at 08:49

## 2019-05-16 RX ADMIN — SODIUM CHLORIDE, PRESERVATIVE FREE 3 ML: 5 INJECTION INTRAVENOUS at 08:53

## 2019-05-16 RX ADMIN — HYDROCODONE BITARTRATE AND ACETAMINOPHEN 1 TABLET: 7.5; 325 TABLET ORAL at 22:27

## 2019-05-16 RX ADMIN — GLIPIZIDE 5 MG: 5 TABLET ORAL at 08:49

## 2019-05-16 NOTE — PROGRESS NOTES
"DAILY PROGRESS NOTE  [facility]    Patient Identification:  Name: Rock Maciel Jr.  Age: 73 y.o.  Sex: male  :  1945  MRN: [id number]         Primary Care Physician: Karen Red MD    Subjective:  Interval History:Complains of pain.    Objective:    Scheduled Meds:  amLODIPine 10 mg Oral Daily   atenolol 25 mg Oral Daily   atorvastatin 40 mg Oral Nightly   glipiZIDE 5 mg Oral BID AC   insulin lispro 0-7 Units Subcutaneous 4x Daily With Meals & Nightly   miconazole nitrate  Topical Q12H   polyethylene glycol 17 g Oral Daily   sennosides-docusate sodium 2 tablet Oral BID   sodium chloride 3 mL Intravenous Q12H     Continuous Infusions:     Vital signs in last 24 hours:  Temp:  [96.8 °F (36 °C)-97.8 °F (36.6 °C)] 97.8 °F (36.6 °C)  Heart Rate:  [58-65] 58  Resp:  [16] 16  BP: (133-184)/(58-66) 184/66    Intake/Output:    Intake/Output Summary (Last 24 hours) at 2019 1153  Last data filed at 2019 0832  Gross per 24 hour   Intake 360 ml   Output 550 ml   Net -190 ml       Exam:  BP (!) 184/66 (BP Location: Left arm, Patient Position: Sitting) Comment: nurse notified  Pulse 58   Temp 97.8 °F (36.6 °C) (Oral)   Resp 16   Ht 185.4 cm (73\")   Wt (!) 143 kg (316 lb)   SpO2 90%   BMI 41.69 kg/m²     General Appearance:    Alert, cooperative, no distress   Head:    Normocephalic, without obvious abnormality, atraumatic   Eyes:       Throat:   Lips, tongue, gums normal   Neck:   Supple, symmetrical, trachea midline, no JVD   Lungs:     Clear to auscultation bilaterally, respirations unlabored   Chest Wall:    No tenderness or deformity    Heart:    Regular rate and rhythm, S1 and S2 normal, no murmur,no  Rub or gallop   Abdomen:     Soft, non-tender, bowel sounds active, no masses, no organomegaly    Extremities:   Extremities normal, left leg surgical changes., no cyanosis or edema   Pulses:      Skin:   Skin is warm and dry,  no rashes or palpable lesions   Neurologic:   no focal " deficits noted      Lab Results (last 72 hours)     Procedure Component Value Units Date/Time    POC Glucose Once [[id number]]  (Abnormal) Collected:  05/16/19 1112    Specimen:  Blood Updated:  05/16/19 1114     Glucose 195 mg/dL     POC Glucose Once [[id number]]  (Abnormal) Collected:  05/16/19 0601    Specimen:  Blood Updated:  05/16/19 0603     Glucose 168 mg/dL     Basic Metabolic Panel [[id number]]  (Abnormal) Collected:  05/16/19 0350    Specimen:  Blood Updated:  05/16/19 0448     Glucose 178 mg/dL      BUN 35 mg/dL      Creatinine 1.70 mg/dL      Sodium 140 mmol/L      Potassium 4.5 mmol/L      Chloride 104 mmol/L      CO2 23.6 mmol/L      Calcium 8.3 mg/dL      eGFR Non African Amer 40 mL/min/1.73      BUN/Creatinine Ratio 20.6     Anion Gap 12.4 mmol/L     Narrative:       GFR Normal >60  Chronic Kidney Disease <60  Kidney Failure <15    CBC (No Diff) [[id number]]  (Abnormal) Collected:  05/16/19 0350    Specimen:  Blood Updated:  05/16/19 0434     WBC 5.61 10*3/mm3      RBC 3.28 10*6/mm3      Hemoglobin 9.5 g/dL      Hematocrit 30.3 %      MCV 92.4 fL      MCH 29.0 pg      MCHC 31.4 g/dL      RDW 14.9 %      RDW-SD 50.2 fl      MPV 10.6 fL      Platelets 171 10*3/mm3     POC Glucose Once [[id number]]  (Abnormal) Collected:  05/15/19 2145    Specimen:  Blood Updated:  05/15/19 2147     Glucose 207 mg/dL     POC Glucose Once [[id number]]  (Abnormal) Collected:  05/15/19 1623    Specimen:  Blood Updated:  05/15/19 1626     Glucose 202 mg/dL     POC Glucose Once [[id number]]  (Abnormal) Collected:  05/15/19 1053    Specimen:  Blood Updated:  05/15/19 1054     Glucose 222 mg/dL     Basic Metabolic Panel [[id number]]  (Abnormal) Collected:  05/15/19 0659    Specimen:  Blood Updated:  05/15/19 0747     Glucose 156 mg/dL      BUN 33 mg/dL      Creatinine 1.44 mg/dL      Sodium 136 mmol/L      Potassium 4.4 mmol/L      Chloride 101 mmol/L      CO2 28.8 mmol/L      Calcium 8.3 mg/dL      eGFR Non  Amer 48  mL/min/1.73      BUN/Creatinine Ratio 22.9     Anion Gap 6.2 mmol/L     Narrative:       GFR Normal >60  Chronic Kidney Disease <60  Kidney Failure <15    POC Glucose Once [[id number]]  (Abnormal) Collected:  05/15/19 0616    Specimen:  Blood Updated:  05/15/19 0618     Glucose 147 mg/dL     CBC (No Diff) [[id number]]  (Abnormal) Collected:  05/15/19 0407    Specimen:  Blood Updated:  05/15/19 0557     WBC 6.08 10*3/mm3      RBC 3.31 10*6/mm3      Hemoglobin 9.6 g/dL      Hematocrit 31.7 %      MCV 95.8 fL      MCH 29.0 pg      MCHC 30.3 g/dL      RDW 15.0 %      RDW-SD 51.9 fl      MPV 10.3 fL      Platelets 172 10*3/mm3     POC Glucose Once [[id number]]  (Abnormal) Collected:  05/14/19 2142    Specimen:  Blood Updated:  05/14/19 2143     Glucose 180 mg/dL     POC Glucose Once [[id number]]  (Abnormal) Collected:  05/14/19 1738    Specimen:  Blood Updated:  05/14/19 1741     Glucose 136 mg/dL     POC Glucose Once [[id number]]  (Normal) Collected:  05/14/19 1314    Specimen:  Blood Updated:  05/14/19 1317     Glucose 128 mg/dL     POC Glucose Once [[id number]]  (Abnormal) Collected:  05/14/19 1056    Specimen:  Blood Updated:  05/14/19 1057     Glucose 132 mg/dL     POC Glucose Once [[id number]]  (Abnormal) Collected:  05/14/19 0730    Specimen:  Blood Updated:  05/14/19 0734     Glucose 162 mg/dL     Vitamin B12 [[id number]]  (Abnormal) Collected:  05/14/19 0451    Specimen:  Blood Updated:  05/14/19 0604     Vitamin B-12 >2,000 pg/mL     Folate [[id number]]  (Normal) Collected:  05/14/19 0451    Specimen:  Blood Updated:  05/14/19 0604     Folate 5.76 ng/mL     TSH [[id number]]  (Normal) Collected:  05/14/19 0451    Specimen:  Blood Updated:  05/14/19 0554     TSH 3.040 mIU/mL     Iron Profile [[id number]]  (Abnormal) Collected:  05/14/19 0451    Specimen:  Blood Updated:  05/14/19 0550     Iron 58 mcg/dL      Iron Saturation 17 %      Transferrin 228 mg/dL      TIBC 340 mcg/dL     Basic Metabolic Panel [[id number]]   (Abnormal) Collected:  05/14/19 0451    Specimen:  Blood Updated:  05/14/19 0550     Glucose 135 mg/dL      BUN 33 mg/dL      Creatinine 1.29 mg/dL      Sodium 134 mmol/L      Potassium 4.2 mmol/L      Chloride 100 mmol/L      CO2 26.0 mmol/L      Calcium 8.8 mg/dL      eGFR Non African Amer 54 mL/min/1.73      BUN/Creatinine Ratio 25.6     Anion Gap 8.0 mmol/L     Narrative:       GFR Normal >60  Chronic Kidney Disease <60  Kidney Failure <15    CBC (No Diff) [[id number]]  (Abnormal) Collected:  05/14/19 0451    Specimen:  Blood Updated:  05/14/19 0526     WBC 5.31 10*3/mm3      RBC 3.91 10*6/mm3      Hemoglobin 11.0 g/dL      Hematocrit 34.7 %      MCV 88.7 fL      MCH 28.1 pg      MCHC 31.7 g/dL      RDW 14.6 %      RDW-SD 46.8 fl      MPV 10.3 fL      Platelets 203 10*3/mm3     POC Glucose Once [[id number]]  (Abnormal) Collected:  05/13/19 2032    Specimen:  Blood Updated:  05/13/19 2035     Glucose 197 mg/dL     POC Glucose Once [[id number]]  (Abnormal) Collected:  05/13/19 1556    Specimen:  Blood Updated:  05/13/19 1557     Glucose 227 mg/dL     POC Glucose Once [[id number]]  (Abnormal) Collected:  05/13/19 1204    Specimen:  Blood Updated:  05/13/19 1205     Glucose 195 mg/dL         Data Review:  Results from last 7 days   Lab Units 05/16/19  0350 05/15/19  0659 05/14/19 0451   SODIUM mmol/L 140 136 134*   POTASSIUM mmol/L 4.5 4.4 4.2   CHLORIDE mmol/L 104 101 100   CO2 mmol/L 23.6 28.8 26.0   BUN mg/dL 35* 33* 33*   CREATININE mg/dL 1.70* 1.44* 1.29*   GLUCOSE mg/dL 178* 156* 135*   CALCIUM mg/dL 8.3* 8.3* 8.8     Results from last 7 days   Lab Units 05/16/19  0350 05/15/19  0407 05/14/19 0451   WBC 10*3/mm3 5.61 6.08 5.31   HEMOGLOBIN g/dL 9.5* 9.6* 11.0*   HEMATOCRIT % 30.3* 31.7* 34.7*   PLATELETS 10*3/mm3 171 172 203     Results from last 7 days   Lab Units 05/14/19  0451   TSH mIU/mL 3.040         Lab Results   Lab Value Date/Time    TROPONINT <0.010 05/08/2019 0554    TROPONINT <0.010 05/07/2019 1746                Invalid input(s): PROT, LABALBU  Results from last 7 days   Lab Units 05/14/19  0451   TSH mIU/mL 3.040         Glucose   Date/Time Value Ref Range Status   05/16/2019 1112 195 (H) 70 - 130 mg/dL Final   05/16/2019 0601 168 (H) 70 - 130 mg/dL Final   05/15/2019 2145 207 (H) 70 - 130 mg/dL Final   05/15/2019 1623 202 (H) 70 - 130 mg/dL Final   05/15/2019 1053 222 (H) 70 - 130 mg/dL Final   05/15/2019 0616 147 (H) 70 - 130 mg/dL Final   05/14/2019 2142 180 (H) 70 - 130 mg/dL Final   05/14/2019 1738 136 (H) 70 - 130 mg/dL Final           Past Medical History:   Diagnosis Date   • Allergic rhinitis    • Bronchitis    • Coronary artery disease    • Diabetes mellitus (CMS/Former[facility]) 2001   • DM (diabetes mellitus) (CMS/[facility]ly [facility])    • Encounter for annual health examination 05/06/2014    Annual Health Assessment   • Gout    • Hiatal hernia    • History of MRSA infection     RIGHT FOOT 2010   • Hyperlipidemia    • Hypertension    • Murmur    • Pneumothorax on right    • Sleep apnea     pt wears CPAP at night   • Wellness examination 06/24/2015    Annual Wellness Visit       Assessment:  Active Hospital Problems    Diagnosis  POA   • **Rupture of left quadriceps tendon [S76.112A]  Yes   • Constipation [K59.00]  No   • Fall [W19.XXXA]  Yes   • Non-traumatic rhabdomyolysis [M62.82]  Yes   • 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       Plan:  Post op care. Will need SNU for rehab. Await precert.    Merlin Egan MD  5/16/2019  11:53 AM

## 2019-05-16 NOTE — PLAN OF CARE
Problem: Patient Care Overview  Goal: Plan of Care Review  Outcome: Ongoing (interventions implemented as appropriate)   05/16/19 1929   Coping/Psychosocial   Plan of Care Reviewed With patient   Plan of Care Review   Progress improving   OTHER   Outcome Summary patient resting comfortably through night, pain controlled at this time, knee hinge brace in place in extension, educated on b/p and glucose monitoring     Goal: Individualization and Mutuality  Outcome: Ongoing (interventions implemented as appropriate)    Goal: Discharge Needs Assessment  Outcome: Ongoing (interventions implemented as appropriate)    Goal: Interprofessional Rounds/Family Conf  Outcome: Ongoing (interventions implemented as appropriate)      Problem: Fall Risk (Adult)  Goal: Absence of Fall  Outcome: Ongoing (interventions implemented as appropriate)      Problem: Pain, Acute (Adult)  Goal: Acceptable Pain Control/Comfort Level  Outcome: Ongoing (interventions implemented as appropriate)      Problem: Skin Injury Risk (Adult)  Goal: Skin Health and Integrity  Outcome: Ongoing (interventions implemented as appropriate)

## 2019-05-16 NOTE — THERAPY TREATMENT NOTE
Acute Care - Physical Therapy Treatment Note  [facility]     Patient Name: Rock Maciel Jr.  : 1945  MRN: [id number]  Today's Date: 2019  Onset of Illness/Injury or Date of Surgery: 19  Date of Referral to PT: 19  Referring Physician: Azra Fox APRN    Admit Date: 2019    Visit Dx:    ICD-10-CM ICD-9-CM   1. Fall, initial encounter W19.XXXA E888.9   2. Generalized weakness R53.1 780.79   3. Left leg pain M79.605 729.5   4. 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 (CMS/HCC)   • Chronic venous insufficiency   • Nonrheumatic aortic valve stenosis   • Carotid stenosis, asymptomatic   • Bradycardia   • Hyperlipidemia   • Bilateral carotid artery disease (CMS/HCC)   • Abnormal cardiovascular stress test   • Renal insufficiency   • H/O aortic valve replacement with porcine valve   • Charcot-Davida-Tooth disease-like deformity of foot   • Amputated toe of right foot (CMS/HCC)   • Fall   • Non-traumatic rhabdomyolysis   • S/P CABG x 2   • CKD (chronic kidney disease) stage 3, GFR 30-59 ml/min (CMS/HCC)   • Rupture of left quadriceps tendon   • Constipation       Therapy Treatment    Rehabilitation Treatment Summary     Row Name 19 1629             Treatment Time/Intention    Discipline  physical therapist  -EE      Document Type  therapy note (daily note)  -EE      Subjective Information  complains  of;weakness;fatigue;pain  -EE      Mode of Treatment  physical therapy;individual therapy  -EE      Patient/Family Observations  Pt supine in bed in no acute distress; knee brace applied L LE  -EE      Patient Effort  good  -EE      Existing Precautions/Restrictions  fall WBAT, knee brace locked in extension  -EE      Recorded by [EE] Madelin Gil, PT 05/16/19 1631      Row Name 05/16/19 1629             Cognitive Assessment/Intervention- PT/OT    Orientation Status (Cognition)  oriented x 3  -EE      Follows Commands (Cognition)  WNL  -EE      Personal Safety Interventions  fall prevention program maintained;gait belt;muscle strengthening facilitated;nonskid shoes/slippers when out of bed;supervised activity  -EE      Recorded by [EE] Madelin Gil, PT 05/16/19 1631      Row Name 05/16/19 1629             Safety Issues, Functional Mobility    Impairments Affecting Function (Mobility)  balance;endurance/activity tolerance;pain;range of motion (ROM);strength  -EE      Recorded by [EE] Madelin Gil, PT 05/16/19 1631      Row Name 05/16/19 1629             Bed Mobility Assessment/Treatment    Supine-Sit [place] (Bed Mobility)  minimum assist (75% patient effort);verbal cues  -EE      Sit-Supine [place] (Bed Mobility)  moderate assist (50% patient effort);2 person assist  -EE      Bed Mobility, Safety Issues  decreased use of legs for bridging/pushing  -EE      Assistive Device (Bed Mobility)  bed rails;head of bed elevated;overhead trapeze  -EE      Recorded by [EE] Madelin Gil, PT 05/16/19 1631      Row Name 05/16/19 1629             Transfer Assessment/Treatment    Comment (Transfers)  sit to stand x2 from EOB; verbal cues for hand placement and setup  -EE      Recorded by [EE] Madelin Gil, PT 05/16/19 1631      Row Name 05/16/19 1629             Sit-Stand Transfer    Sit-Stand [place] (Transfers)  moderate assist (50% patient effort);maximum assist (25% patient effort);2 person assist;verbal cues   -EE      Assistive Device (Sit-Stand Transfers)  walker, front-wheeled elevated bed surface  -EE      Recorded by [EE] Madelin Gil, PT 05/16/19 1631      Row Name 05/16/19 1629             Stand-Sit Transfer    Stand-Sit [place] (Transfers)  minimum assist (75% patient effort);2 person assist  -EE      Recorded by [EE] Madelin Gil, PT 05/16/19 1631      Row Name 05/16/19 1629             Gait/Stairs Assessment/Training    [place] Level (Gait)  moderate assist (50% patient effort);2 person assist;verbal cues  -EE      Assistive Device (Gait)  walker, front-wheeled  -EE      Distance in Feet (Gait)  3 side steps to HOB x 2 trials  -EE      Deviations/Abnormal Patterns (Gait)  antalgic;crispin decreased;festinating/shuffling  -EE      Bilateral Gait Deviations  forward flexed posture  -EE      Comment (Gait/Stairs)  Able to take 3 shuffling side steps to HOB x2 trials; seated rest break between trials  -EE      Recorded by [EE] Madelin Gil, PT 05/16/19 1633      Row Name 05/16/19 1629             Therapeutic Exercise    Lower Extremity (Therapeutic Exercise)  LAQ (long arc quad), right  -EE      Lower Extremity Range of Motion (Therapeutic Exercise)  hip flexion/extension, right;ankle dorsiflexion/plantar flexion, bilateral  -EE      Exercise Type (Therapeutic Exercise)  AROM (active range of motion)  -EE      Position (Therapeutic Exercise)  seated  -EE      Sets/Reps (Therapeutic Exercise)  1/10  -EE      Recorded by [EE] Madelin Gil, PT 05/16/19 1633      Row Name 05/16/19 1629             Static Sitting Balance    Level of [place] (Unsupported Sitting, Static Balance)  supervision  -EE      Sitting Position (Unsupported Sitting, Static Balance)  sitting on edge of bed  -EE      Time Able to Maintain Position (Unsupported Sitting, Static Balance)  more than 5 minutes  -EE      Recorded by [EE] Madelin Gil, PT 05/16/19 1633      Row Name 05/16/19 1629             Static Standing Balance    Level  of [place] (Supported Standing, Static Balance)  contact guard assist;minimal assist, 75% patient effort;2 person assist  -EE      Time Able to Maintain Position (Supported Standing, Static Balance)  1 to 2 minutes x2 trials  -EE      Assistive Device Utilized (Supported Standing, Static Balance)  walker, rolling  -EE      Recorded by [EE] Madelin Gil, PT 05/16/19 1633      Row Name 05/16/19 1629             Positioning and Restraints    Pre-Treatment Position  in bed  -EE      Post Treatment Position  bed  -EE      In Bed  fowlers;call light within reach;encouraged to call for assist;with brace  -EE      Recorded by [EE] Madelin Gil, PT 05/16/19 1633      Row Name 05/16/19 1629             Pain Assessment    Additional Documentation  Pain Scale: Numbers Pre/Post-Treatment (Group);Pain Scale: Word Pre/Post-Treatment (Group)  -EE      Recorded by [EE] Madelin Gil, PT 05/16/19 1633      Row Name 05/16/19 1629             Pain Scale: Numbers Pre/Post-Treatment    Pain Location - Side  Left  -EE      Pain Location  knee  -EE      Pain Intervention(s)  Repositioned;Rest  -EE      Recorded by [EE] Madelin Gil, PT 05/16/19 1633      Row Name 05/16/19 1629             Pain Scale: Word Pre/Post-Treatment    Pain: Word Scale, Pretreatment  4 - moderate pain  -EE      Pain: Word Scale, Post-Treatment  4 - moderate pain  -EE      Recorded by [EE] Madelin Gil, PT 05/16/19 1633      Row Name                Residual Limb Assessment 05/06/19 0900 other (see comments)    Residual Limb Assessment - Properties Group Date first assessed: 05/06/19 [LL] Time first assessed: 0900 [LL] Amputation Date: -- [LL], unknown  Location: other (see comments) [LL], Left great toe, Right small toes (4 &5)  Recorded by:  [LL] Ling Aleman RN 05/07/19 1218    Row Name                Wound 05/12/19 0900 Left anterior;upper thigh blisters    Wound - Properties Group Date first assessed: 05/12/19 [AE] Time first assessed: 0900 [AE]  Present On Admission : picture taken [AE] Side: Left [AE] Orientation: anterior;upper [AE] Location: thigh [AE] Type: blisters [AE] Recorded by:  [AE] Nina Holman RN 05/12/19 1241    Row Name                Wound 05/14/19 1615 Left leg incision    Wound - Properties Group Date first assessed: 05/14/19 [MM] Time first assessed: 1615 [MM] Side: Left [MM] Location: leg [MM] Type: incision [MM] Recorded by:  [MM] Lorri Conley RN 05/14/19 1615    Row Name 05/16/19 1629             Outcome Summary/Treatment Plan (PT)    Anticipated Discharge Disposition (PT)  skilled nursing facility  -      Recorded by [EE] Madelin Gil, PT 05/16/19 1633        User Key  (r) = Recorded By, (t) = Taken By, (c) = Cosigned By    Initials Name Effective Dates Discipline    AE Nina Holman RN 09/07/17 -  Nurse    EE Madelin Gil, PT 04/03/18 -  PT    MM Lorri Conley RN 06/16/16 -  Nurse    LL Ling Aleman RN 07/13/18 -  Nurse          Wound 05/12/19 0900 Left anterior;upper thigh blisters (Active)   Dressing Appearance dry;intact;no drainage 5/16/2019  4:31 PM   Closure NIKKI 5/16/2019  4:31 PM   Base dressing in place, unable to visualize 5/16/2019  4:31 PM   Drainage Amount none 5/16/2019  4:31 PM       Wound 05/14/19 1615 Left leg incision (Active)   Dressing Appearance dry;intact;no drainage 5/16/2019  4:31 PM   Closure NIKKI 5/16/2019  4:31 PM   Base dressing in place, unable to visualize 5/16/2019  4:31 PM   Drainage Amount none 5/16/2019  4:31 PM           Physical Therapy Education     Title: PT OT SLP Therapies (In Progress)     Topic: Physical Therapy (Done)     Point: Mobility training (Done)     Learning Progress Summary           Patient Acceptance, E,D, VU,NR by EE at 5/16/2019  4:34 PM    Acceptance, E, VU by MS at 5/15/2019  1:22 PM    Acceptance, E,TB, VU,DU by CW at 5/13/2019  2:39 PM    Acceptance, ELUCINA VU, DU by CW at 5/10/2019  3:28 PM    Acceptance, LUCINA HARPER VU, DU by CW at 5/9/2019  1:34 PM     Acceptance, E,TB, VU,DU by CW at 5/8/2019 10:50 AM    Acceptance, E,D, VU,DU by EH at 5/7/2019 11:36 AM    Acceptance, E, NR by CA at 5/6/2019 10:55 AM                   Point: Home exercise program (Done)     Learning Progress Summary           Patient Acceptance, E,D, VU,NR by EE at 5/16/2019  4:34 PM    Acceptance, E, VU by MS at 5/15/2019  1:22 PM    Acceptance, E,TB, VU,DU by CW at 5/13/2019  2:39 PM    Acceptance, E,TB, VU,DU by CW at 5/10/2019  3:28 PM    Acceptance, E,TB, VU,DU by CW at 5/9/2019  1:34 PM    Acceptance, E,TB, VU,DU by CW at 5/8/2019 10:50 AM    Acceptance, E,D, VU,DU by  at 5/7/2019 11:36 AM    Acceptance, E, NR by CA at 5/6/2019 10:55 AM                   Point: Body mechanics (Done)     Learning Progress Summary           Patient Acceptance, E,D, VU,NR by EE at 5/16/2019  4:34 PM    Acceptance, E, VU by MS at 5/15/2019  1:22 PM    Acceptance, E,TB, VU,DU by CW at 5/13/2019  2:39 PM    Acceptance, E,TB, VU,DU by CW at 5/10/2019  3:28 PM    Acceptance, E,TB, VU,DU by CW at 5/9/2019  1:34 PM    Acceptance, E,TB, VU,DU by CW at 5/8/2019 10:50 AM    Acceptance, E,D, VU,DU by  at 5/7/2019 11:36 AM    Acceptance, E, NR by CA at 5/6/2019 10:55 AM                   Point: Precautions (Done)     Learning Progress Summary           Patient Acceptance, E,D, VU,NR by EE at 5/16/2019  4:34 PM    Acceptance, E, VU by MS at 5/15/2019  1:22 PM    Acceptance, E,TB, VU,DU by CW at 5/13/2019  2:39 PM    Acceptance, E,TB, VU,DU by CW at 5/10/2019  3:28 PM    Acceptance, E,TB, VU,DU by CW at 5/9/2019  1:34 PM    Acceptance, LUCINA HARPER VU, DU by CW at 5/8/2019 10:50 AM    Acceptance, MEREDITH,CARI DELA CRUZ DU by  at 5/7/2019 11:36 AM    Acceptance, ROSA MARIA HARPER by CA at 5/6/2019 10:55 AM                               User Key     Initials Effective Dates Name Provider Type Discipline    EE 04/03/18 -  Madelin Gil, PT Physical Therapist PT    MS 03/04/19 -  Lorri Gallego, PT Physical Therapist PT    CW 03/07/18 -  Edu  Andi PICKARD, PTA Physical Therapy Assistant PT     08/19/18 -  Henrietta Barker PTA Physical Therapy Assistant PT    CA 06/13/18 -  Ayanna Mar, REGAN Physical Therapist PT                PT Recommendation and Plan  Anticipated Discharge Disposition (PT): skilled nursing facility  Outcome Summary/Treatment Plan (PT)  Anticipated Discharge Disposition (PT): skilled nursing facility  Plan of Care Reviewed With: patient  Progress: improving  Outcome Summary: Pt demonstrates good progress with mobility. Requiring less assistance when moving from supine to sit and able to tolerate taking several side steps at EOB. Continues to require assist of two for transfers due to LE weakness and pain. Will continue to strengthen and progress activity as able.   Outcome Measures     Row Name 05/16/19 1600 05/15/19 1300          How much help from another person do you currently need...    Turning from your back to your side while in flat bed without using bedrails?  3  -EE  3  -MS     Moving from lying on back to sitting on the side of a flat bed without bedrails?  3  -EE  2  -MS     Moving to and from a bed to a chair (including a wheelchair)?  2  -EE  1  -MS     Standing up from a chair using your arms (e.g., wheelchair, bedside chair)?  2  -EE  2  -MS     Climbing 3-5 steps with a railing?  1  -EE  1  -MS     To walk in hospital room?  1  -EE  1  -MS     AM-PAC 6 Clicks Score  12  -EE  10  -MS        Functional Assessment    Outcome Measure Options  AM-PAC 6 Clicks Basic Mobility (PT)  -EE  AM-PAC 6 Clicks Basic Mobility (PT)  -MS       User Key  (r) = Recorded By, (t) = Taken By, (c) = Cosigned By    Initials Name Provider Type    EE Madelin Gil, PT Physical Therapist    Lorri Olivarez, PT Physical Therapist         Time Calculation:   PT Charges     Row Name 05/16/19 1635             Time Calculation    Start Time  1412  -EE      Stop Time  1428  -EE      Time Calculation (min)  16 min  -EE      PT Received On  05/16/19   -EE      PT - Next Appointment  05/17/19  -EE         Time Calculation- PT    Total Timed Code Minutes- PT  16 minute(s)  -EE        User Key  (r) = Recorded By, (t) = Taken By, (c) = Cosigned By    Initials Name Provider Type    EE Madelin Gil, PT Physical Therapist        Therapy Charges for Today     Code Description Service Date Service Provider Modifiers Qty    86261213613  PT THERAPEUTIC ACT EA 15 MIN 5/16/2019 Madelin Gil, PT GP 1    [id number]  PT THER SUPP EA 15 MIN 5/16/2019 Madelin Gil, PT GP 1          PT G-Codes  Outcome Measure Options: AM-PAC 6 Clicks Basic Mobility (PT)  AM-PAC 6 Clicks Score: 12    Madelin Gil PT  5/16/2019

## 2019-05-16 NOTE — PROGRESS NOTES
[facility]  Cardiology Progress Note    Patient Identification:  Name: Rock Maciel Jr.  Age: 73 y.o.  Sex: male  :  1945  MRN: [id number]                 Follow Up / Chief Complaint: Cardiac clearance       Interval History: Left quadricep tendon repair on .  Hypersensitive today, this a.m.  He states he was moving when they took his blood pressure I had nurse retake it manually and she got systolic 140s.    Bradycardia has improved.           Subjective: Patient states he has pain with his left leg with movement   Denies chest pain, tightness, palpitations, and shortness of breath.       Objective:    Past Medical History:  Past Medical History:   Diagnosis Date   • Allergic rhinitis    • Bronchitis    • Coronary artery disease    • Diabetes mellitus (CMS/[facility])    • DM (diabetes mellitus) (CMS/[facility])    • Encounter for annual health examination 2014    Annual Health Assessment   • Gout    • Hiatal hernia    • History of MRSA infection     RIGHT FOOT    • Hyperlipidemia    • Hypertension    • Murmur    • Pneumothorax on right    • Sleep apnea     pt wears CPAP at night   • Wellness examination 2015    Annual Wellness Visit     Past Surgical History:  Past Surgical History:   Procedure Laterality Date   • ARTERY SURGERY Bilateral     carotid   • CARDIAC CATHETERIZATION N/A 2018    Procedure: Left Heart Cath;  Surgeon: Jo Toney MD;  Location:  [facility];  Service: Cardiovascular   • CARDIAC CATHETERIZATION N/A 2018    Procedure: Coronary angiography;  Surgeon: Jo Toney MD;  Location:  [facility];  Service: Cardiovascular   • CAROTID ENDARTERECTOMY Bilateral    • COLONOSCOPY     • CORONARY ARTERY BYPASS GRAFT WITH AORTIC VALVE REPAIR/REPLACEMENT N/A 2018    Procedure: INTRAOPERATIVE ALEX, MIDLINE STERNOTOMY, CORONARY ARTERY BYPASS GRAFTING X 3 USING ENDOSCOPICALLY HARVESTED LEFT GREATER  SAPHENOUS VEIN,  AORTIC VALVE REPLACEMENT USING 25MM LOPEZ II ULTRA PORCINE VALVE, PRP;  Surgeon: Phong Posey MD;  Location: [facility] OR;  Service: Cardiothoracic   • FOOT SURGERY Right 2010    5th digit removal   • FOOT SURGERY Left 2011    1 digit removed   • QUADRICEPS TENDON REPAIR Left 5/14/2019    Procedure: Left QUADRICEPS TENDON REPAIR;  Surgeon: Camilo Hunter MD;  Location: [facility] OR;  Service: Orthopedics   • THORACENTESIS Right 11/21/2016   • THORACOSCOPY Right 5/8/2017    Procedure: BRONCHOSCOPY, RIGHT VAT,  TOTAL DECORTICATION RIGHT LUNG, PLEURAL BX, PLACEMENT SUBPLEURAL PAIN CAATHETERS X2;  Surgeon: Donald Orlando III, MD;  Location: [facility] OR;  Service:    • TONSILECTOMY, ADENOIDECTOMY, BILATERAL MYRINGOTOMY AND TUBES          Social History:   Social History     Tobacco Use   • Smoking status: Never Smoker   • Smokeless tobacco: Never Used   Substance Use Topics   • Alcohol use: Yes     Comment: either one glass wine or one glass liquor per  day      Family History:  Family History   Problem Relation Age of Onset   • Hypertension Father           Allergies:  Allergies   Allergen Reactions   • Ceclor [Cefaclor] Rash     Scheduled Meds:    amLODIPine 10 mg Daily   atenolol 25 mg Daily   atorvastatin 40 mg Nightly   glipiZIDE 5 mg BID AC   insulin lispro 0-7 Units 4x Daily With Meals & Nightly   miconazole nitrate  Q12H   polyethylene glycol 17 g Daily   sennosides-docusate sodium 2 tablet BID   sodium chloride 3 mL Q12H         I have reviewed patient's most recent medical history and current medications.        INTAKE AND OUTPUT:    Intake/Output Summary (Last 24 hours) at 5/16/2019 1134  Last data filed at 5/16/2019 0832  Gross per 24 hour   Intake 360 ml   Output 550 ml   Net -190 ml       Review of Systems:   GI: Denies abdominal pain, nausea, vomiting, and diarrhea.  Cardiac: Denies chest pain, tightness, and palpitations.  Pulmonary: Denies shortness of breath and  cough    Constitutional:  Temp:  [96.8 °F (36 °C)-97.8 °F (36.6 °C)] 97.8 °F (36.6 °C)  Heart Rate:  [58-65] 58  Resp:  [16] 16  BP: (133-184)/(58-66) 184/66    Physical Exam:   General: No acute distress; resting comfortably in bed.    Eyes: EOM normal and no conjunctival drainage.  HEENT: No JVD.  Thyroid not visibly enlarged.  No mucosal pallor or cyanosis.      Respiratory: respirations regular and unlabored at rest. BBS Rations regular and unlabored at rest. Diminished bases.  No crackles, rubs, or wheezes noted.      Cardiovascular: S1S2 regular rate and rhythm.  No murmur, rub, or gallop. DP/PT pulses 1+.  Trace-1+  pretibial pitting edema   Gastrointestinal: Soft, round, and nontender.  Bowel sounds present.  No ascites.  Musculoskeletal: MARSHALL x4. No abnormal movements- amputated LE discharge noted.    Extremities: No digital clubbing or cyanosis.  Skin: Warm and dry to touch.     Neuro: AAO x 3 CN II-XII grossly intact, and answers appropriately.    Psych: Affect normal, pleasant, cooperative.        Cardiographics  Telemetry:   unavailable    EC/15/2019 sinus rhythm with RBBB and LAFP.  Rate 58      5/7 sinus rate rate 50s bradycardia, RBBB none similar to prior tracing.           2018 SR rate 70s RBBB        I Have reviewed and interpreted EKGs.    Echocardiogram:       Interpretation Summary     · Left ventricular systolic function is normal. Calculated EF = 64.0%. Estimated EF was in agreement with the calculated EF. Estimated EF = 64%. Normal left ventricular cavity size noted. All left ventricular wall segments contract normally. Left ventricular wall thickness is consistent with mild concentric hypertrophy. Left ventricular diastolic dysfunction is noted (grade II w/high LAP) consistent with pseudonormalization.  · Left atrial volume is borderline increased.  · There is a prosthetic aortic valve. No aortic valve stenosis is present. There is a bioprosthetic valve present. Hemodynamically  significant regurgitation is present. There is moderate prosthetic aortic valve regurgitation. Cannot tell from the study if this is paravalvular transvalvular in origin..  · Severe MAC is present. There is a nodular appearance to the mitral valve leaflets. Cannot rule out nodular sclerosis versus vegetative lesions.. There is severe bileaflet mitral valve thickening present. Mild mitral valve regurgitation is present.            7/2018  Interpretation Summary     · Limited echo performed  · Suboptimal scan  · LV function appears to be normal  · No pericardial effusion         5/2018  Interpretation Summary     · Left atrial cavity size is moderately dilated.  · Mild mitral valve regurgitation is present  · Mild tricuspid valve regurgitation is present.  · Calculated EF = 68%.  · There is no evidence of pericardial effusion.  · There is calcification of the aortic valve.  · Mild to moderate aortic valve stenosis is present.       Stress test  9/2018  Interpretation Summary        · Equivocal ECG evidence of myocardial ischemia.Negative clinical evidence of myocardial ischemia. Findings consistent with an equivocal ECG stress test. Submaximal Stress Test.  · . Asymptomatic for chest pain       Cardiac catheterization  7/2018  Conclusion        · Successful right and left coronary angiogram and LV pressures  · Normal left main  · Minimum diffuse left anterior descending irregularity with midportion 40-50% stenosis  · Circumflex artery has OM ostial 70% distal circumflex 60% stenosis  · Right coronary artery dominant with mid and distal portion 70% stenosis  · Severe aortic stenosis with gradient 60 mmHg               Lab Review           Results from last 7 days   Lab Units 05/16/19  0350   SODIUM mmol/L 140   POTASSIUM mmol/L 4.5   BUN mg/dL 35*   CREATININE mg/dL 1.70*   CALCIUM mg/dL 8.3*       Results from last 7 days   Lab Units 05/16/19  0350 05/15/19  0407 05/14/19  0451   WBC 10*3/mm3 5.61 6.08 5.31  "  HEMOGLOBIN g/dL 9.5* 9.6* 11.0*   HEMATOCRIT % 30.3* 31.7* 34.7*   PLATELETS 10*3/mm3 171 172 203               Assessment/plan    1.  Cardiac clearance: Left quadricep repair on 5/14.     2.  CAD: CABG July 2018.  Continues to be without chest pain, tightness, and shortness of breath.      3.  Hypertension: Blood pressure elevated today sys 180s, this a.m.  Patient very mild pain with movement.  Nurse recheck blood pressure manually and pressure was systolic 140s.   Unsure if first blood pressure was accurate.    4.  Left quadricep repair: Surgery was 5/14.  Defer to surgery.       5.  Bradycardia: Improved with reduction of beta-blocker.         6.  Obstructive sleep apnea: 10 you to use CPAP machine.    7.  Dyslipidemia: Currently on Lipitor 40 mg.  Continue medication        5/16/2019  ZEINA Fox/Transcription:   \"Dictated utilizing Dragon dictation\".   "

## 2019-05-16 NOTE — PLAN OF CARE
Problem: Patient Care Overview  Goal: Plan of Care Review  Outcome: Ongoing (interventions implemented as appropriate)   05/16/19 0509 05/16/19 1636   Coping/Psychosocial   Plan of Care Reviewed With patient --    Plan of Care Review   Progress improving --    OTHER   Outcome Summary --  Pt pod 2 left quad tendon repair. Dressing CDI, vss, n/v intact, pain controlled with po meds. Will continue to monitor glucose r/t hx of DM. Waiting for pre-cert to rehab for d/c.     Goal: Individualization and Mutuality  Outcome: Ongoing (interventions implemented as appropriate)    Goal: Discharge Needs Assessment  Outcome: Ongoing (interventions implemented as appropriate)    Goal: Interprofessional Rounds/Family Conf  Outcome: Ongoing (interventions implemented as appropriate)      Problem: Fall Risk (Adult)  Goal: Absence of Fall  Outcome: Ongoing (interventions implemented as appropriate)      Problem: Pain, Acute (Adult)  Goal: Acceptable Pain Control/Comfort Level  Outcome: Ongoing (interventions implemented as appropriate)      Problem: Skin Injury Risk (Adult)  Goal: Skin Health and Integrity  Outcome: Ongoing (interventions implemented as appropriate)

## 2019-05-16 NOTE — PLAN OF CARE
Problem: Patient Care Overview  Goal: Plan of Care Review   05/16/19 8152   Coping/Psychosocial   Plan of Care Reviewed With patient   Plan of Care Review   Progress improving   OTHER   Outcome Summary Pt demonstrates good progress with mobility. Requiring less assistance when moving from supine to sit and able to tolerate taking several side steps at EOB. Continues to require assist of two for transfers due to LE weakness and pain. Will continue to strengthen and progress activity as able.

## 2019-05-17 LAB
ANION GAP SERPL CALCULATED.3IONS-SCNC: 8.5 MMOL/L
BASOPHILS # BLD AUTO: 0.01 10*3/MM3 (ref 0–0.2)
BASOPHILS NFR BLD AUTO: 0.2 % (ref 0–1.5)
BUN BLD-MCNC: 38 MG/DL (ref 8–23)
BUN/CREAT SERPL: 22.4 (ref 7–25)
CALCIUM SPEC-SCNC: 8.5 MG/DL (ref 8.6–10.5)
CHLORIDE SERPL-SCNC: 104 MMOL/L (ref 98–107)
CO2 SERPL-SCNC: 27.5 MMOL/L (ref 22–29)
CREAT BLD-MCNC: 1.7 MG/DL (ref 0.76–1.27)
DEPRECATED RDW RBC AUTO: 51.9 FL (ref 37–54)
EOSINOPHIL # BLD AUTO: 0.36 10*3/MM3 (ref 0–0.4)
EOSINOPHIL NFR BLD AUTO: 7.2 % (ref 0.3–6.2)
ERYTHROCYTE [DISTWIDTH] IN BLOOD BY AUTOMATED COUNT: 15.1 % (ref 12.3–15.4)
GFR SERPL CREATININE-BSD FRML MDRD: 40 ML/MIN/1.73
GLUCOSE BLD-MCNC: 130 MG/DL (ref 65–99)
GLUCOSE BLDC GLUCOMTR-MCNC: 126 MG/DL (ref 70–130)
GLUCOSE BLDC GLUCOMTR-MCNC: 140 MG/DL (ref 70–130)
GLUCOSE BLDC GLUCOMTR-MCNC: 162 MG/DL (ref 70–130)
GLUCOSE BLDC GLUCOMTR-MCNC: 199 MG/DL (ref 70–130)
HCT VFR BLD AUTO: 30 % (ref 37.5–51)
HGB BLD-MCNC: 9.2 G/DL (ref 13–17.7)
IMM GRANULOCYTES # BLD AUTO: 0.02 10*3/MM3 (ref 0–0.05)
IMM GRANULOCYTES NFR BLD AUTO: 0.4 % (ref 0–0.5)
LYMPHOCYTES # BLD AUTO: 1.1 10*3/MM3 (ref 0.7–3.1)
LYMPHOCYTES NFR BLD AUTO: 22.1 % (ref 19.6–45.3)
MCH RBC QN AUTO: 28.8 PG (ref 26.6–33)
MCHC RBC AUTO-ENTMCNC: 30.7 G/DL (ref 31.5–35.7)
MCV RBC AUTO: 93.8 FL (ref 79–97)
MONOCYTES # BLD AUTO: 0.41 10*3/MM3 (ref 0.1–0.9)
MONOCYTES NFR BLD AUTO: 8.2 % (ref 5–12)
NEUTROPHILS # BLD AUTO: 3.07 10*3/MM3 (ref 1.7–7)
NEUTROPHILS NFR BLD AUTO: 61.9 % (ref 42.7–76)
NRBC BLD AUTO-RTO: 0 /100 WBC (ref 0–0.2)
PLATELET # BLD AUTO: 172 10*3/MM3 (ref 140–450)
PMV BLD AUTO: 10.5 FL (ref 6–12)
POTASSIUM BLD-SCNC: 4.4 MMOL/L (ref 3.5–5.2)
RBC # BLD AUTO: 3.2 10*6/MM3 (ref 4.14–5.8)
SODIUM BLD-SCNC: 140 MMOL/L (ref 136–145)
WBC NRBC COR # BLD: 4.97 10*3/MM3 (ref 3.4–10.8)

## 2019-05-17 PROCEDURE — 82962 GLUCOSE BLOOD TEST: CPT

## 2019-05-17 PROCEDURE — 97110 THERAPEUTIC EXERCISES: CPT

## 2019-05-17 PROCEDURE — 85025 COMPLETE CBC W/AUTO DIFF WBC: CPT | Performed by: HOSPITALIST

## 2019-05-17 PROCEDURE — 99232 SBSQ HOSP IP/OBS MODERATE 35: CPT | Performed by: NURSE PRACTITIONER

## 2019-05-17 PROCEDURE — 94799 UNLISTED PULMONARY SVC/PX: CPT

## 2019-05-17 PROCEDURE — 63710000001 INSULIN LISPRO (HUMAN) PER 5 UNITS: Performed by: NURSE PRACTITIONER

## 2019-05-17 PROCEDURE — 80048 BASIC METABOLIC PNL TOTAL CA: CPT | Performed by: HOSPITALIST

## 2019-05-17 RX ORDER — CLOPIDOGREL BISULFATE 75 MG/1
75 TABLET ORAL DAILY
Status: DISCONTINUED | OUTPATIENT
Start: 2019-05-17 | End: 2019-05-19 | Stop reason: HOSPADM

## 2019-05-17 RX ADMIN — SENNOSIDES AND DOCUSATE SODIUM 2 TABLET: 8.6; 5 TABLET ORAL at 08:11

## 2019-05-17 RX ADMIN — SODIUM CHLORIDE, PRESERVATIVE FREE 3 ML: 5 INJECTION INTRAVENOUS at 08:13

## 2019-05-17 RX ADMIN — SODIUM CHLORIDE, PRESERVATIVE FREE 3 ML: 5 INJECTION INTRAVENOUS at 21:15

## 2019-05-17 RX ADMIN — INSULIN LISPRO 2 UNITS: 100 INJECTION, SOLUTION INTRAVENOUS; SUBCUTANEOUS at 17:11

## 2019-05-17 RX ADMIN — HYDROCODONE BITARTRATE AND ACETAMINOPHEN 2 TABLET: 7.5; 325 TABLET ORAL at 08:11

## 2019-05-17 RX ADMIN — AMLODIPINE BESYLATE 10 MG: 10 TABLET ORAL at 08:11

## 2019-05-17 RX ADMIN — GLIPIZIDE 5 MG: 5 TABLET ORAL at 06:55

## 2019-05-17 RX ADMIN — ATENOLOL 25 MG: 25 TABLET ORAL at 08:11

## 2019-05-17 RX ADMIN — Medication 5 MG: at 23:51

## 2019-05-17 RX ADMIN — HYDROCODONE BITARTRATE AND ACETAMINOPHEN 2 TABLET: 7.5; 325 TABLET ORAL at 17:10

## 2019-05-17 RX ADMIN — ATORVASTATIN CALCIUM 40 MG: 20 TABLET, FILM COATED ORAL at 21:16

## 2019-05-17 RX ADMIN — INSULIN LISPRO 2 UNITS: 100 INJECTION, SOLUTION INTRAVENOUS; SUBCUTANEOUS at 12:08

## 2019-05-17 RX ADMIN — CLOPIDOGREL 75 MG: 75 TABLET, FILM COATED ORAL at 19:42

## 2019-05-17 RX ADMIN — MICONAZOLE NITRATE: 2 OINTMENT TOPICAL at 08:12

## 2019-05-17 RX ADMIN — POLYETHYLENE GLYCOL 3350 17 G: 17 POWDER, FOR SOLUTION ORAL at 08:11

## 2019-05-17 RX ADMIN — BISACODYL 10 MG: 10 SUPPOSITORY RECTAL at 17:10

## 2019-05-17 RX ADMIN — GLIPIZIDE 5 MG: 5 TABLET ORAL at 17:10

## 2019-05-17 NOTE — PROGRESS NOTES
"DAILY PROGRESS NOTE  [facility]    Patient Identification:  Name: Rock Maciel Jr.  Age: 73 y.o.  Sex: male  :  1945  MRN: [id number]         Primary Care Physician: Karen Red MD    Subjective:  Interval History:Complains of pain.    Objective:    Scheduled Meds:    amLODIPine 10 mg Oral Daily   atenolol 25 mg Oral Daily   atorvastatin 40 mg Oral Nightly   glipiZIDE 5 mg Oral BID AC   insulin lispro 0-7 Units Subcutaneous 4x Daily With Meals & Nightly   miconazole nitrate  Topical Q12H   polyethylene glycol 17 g Oral Daily   sennosides-docusate sodium 2 tablet Oral BID   sodium chloride 3 mL Intravenous Q12H     Continuous Infusions:     Vital signs in last 24 hours:  Temp:  [97.3 °F (36.3 °C)-98.4 °F (36.9 °C)] 97.8 °F (36.6 °C)  Heart Rate:  [50-72] 70  Resp:  [16] 16  BP: (123-160)/(55-64) 147/64    Intake/Output:    Intake/Output Summary (Last 24 hours) at 2019 1250  Last data filed at 2019 0805  Gross per 24 hour   Intake 570 ml   Output 1150 ml   Net -580 ml       Exam:  /64 (BP Location: Left arm, Patient Position: Lying)   Pulse 70   Temp 97.8 °F (36.6 °C) (Oral)   Resp 16   Ht 185.4 cm (73\")   Wt (!) 143 kg (316 lb)   SpO2 94%   BMI 41.69 kg/m²     General Appearance:    Alert, cooperative, no distress   Head:    Normocephalic, without obvious abnormality, atraumatic   Eyes:       Throat:   Lips, tongue, gums normal   Neck:   Supple, symmetrical, trachea midline, no JVD   Lungs:     Clear to auscultation bilaterally, respirations unlabored   Chest Wall:    No tenderness or deformity    Heart:    Regular rate and rhythm, S1 and S2 normal, no murmur,no  Rub or gallop   Abdomen:     Soft, non-tender, bowel sounds active, no masses, no organomegaly    Extremities:   Extremities normal, left leg surgical changes., no cyanosis or edema   Pulses:      Skin:   Skin is warm and dry,  no rashes or palpable lesions   Neurologic:   no focal deficits noted      Lab " Results (last 72 hours)     Procedure Component Value Units Date/Time    POC Glucose Once [[id number]]  (Abnormal) Collected:  05/16/19 1112    Specimen:  Blood Updated:  05/16/19 1114     Glucose 195 mg/dL     POC Glucose Once [[id number]]  (Abnormal) Collected:  05/16/19 0601    Specimen:  Blood Updated:  05/16/19 0603     Glucose 168 mg/dL     Basic Metabolic Panel [[id number]]  (Abnormal) Collected:  05/16/19 0350    Specimen:  Blood Updated:  05/16/19 0448     Glucose 178 mg/dL      BUN 35 mg/dL      Creatinine 1.70 mg/dL      Sodium 140 mmol/L      Potassium 4.5 mmol/L      Chloride 104 mmol/L      CO2 23.6 mmol/L      Calcium 8.3 mg/dL      eGFR Non African Amer 40 mL/min/1.73      BUN/Creatinine Ratio 20.6     Anion Gap 12.4 mmol/L     Narrative:       GFR Normal >60  Chronic Kidney Disease <60  Kidney Failure <15    CBC (No Diff) [[id number]]  (Abnormal) Collected:  05/16/19 0350    Specimen:  Blood Updated:  05/16/19 0434     WBC 5.61 10*3/mm3      RBC 3.28 10*6/mm3      Hemoglobin 9.5 g/dL      Hematocrit 30.3 %      MCV 92.4 fL      MCH 29.0 pg      MCHC 31.4 g/dL      RDW 14.9 %      RDW-SD 50.2 fl      MPV 10.6 fL      Platelets 171 10*3/mm3     POC Glucose Once [[id number]]  (Abnormal) Collected:  05/15/19 2145    Specimen:  Blood Updated:  05/15/19 2147     Glucose 207 mg/dL     POC Glucose Once [[id number]]  (Abnormal) Collected:  05/15/19 1623    Specimen:  Blood Updated:  05/15/19 1626     Glucose 202 mg/dL     POC Glucose Once [[id number]]  (Abnormal) Collected:  05/15/19 1053    Specimen:  Blood Updated:  05/15/19 1054     Glucose 222 mg/dL     Basic Metabolic Panel [[id number]]  (Abnormal) Collected:  05/15/19 0659    Specimen:  Blood Updated:  05/15/19 0747     Glucose 156 mg/dL      BUN 33 mg/dL      Creatinine 1.44 mg/dL      Sodium 136 mmol/L      Potassium 4.4 mmol/L      Chloride 101 mmol/L      CO2 28.8 mmol/L      Calcium 8.3 mg/dL      eGFR Non African Amer 48 mL/min/1.73      BUN/Creatinine  Ratio 22.9     Anion Gap 6.2 mmol/L     Narrative:       GFR Normal >60  Chronic Kidney Disease <60  Kidney Failure <15    POC Glucose Once [[id number]]  (Abnormal) Collected:  05/15/19 0616    Specimen:  Blood Updated:  05/15/19 0618     Glucose 147 mg/dL     CBC (No Diff) [[id number]]  (Abnormal) Collected:  05/15/19 0407    Specimen:  Blood Updated:  05/15/19 0557     WBC 6.08 10*3/mm3      RBC 3.31 10*6/mm3      Hemoglobin 9.6 g/dL      Hematocrit 31.7 %      MCV 95.8 fL      MCH 29.0 pg      MCHC 30.3 g/dL      RDW 15.0 %      RDW-SD 51.9 fl      MPV 10.3 fL      Platelets 172 10*3/mm3     POC Glucose Once [[id number]]  (Abnormal) Collected:  05/14/19 2142    Specimen:  Blood Updated:  05/14/19 2143     Glucose 180 mg/dL     POC Glucose Once [[id number]]  (Abnormal) Collected:  05/14/19 1738    Specimen:  Blood Updated:  05/14/19 1741     Glucose 136 mg/dL     POC Glucose Once [[id number]]  (Normal) Collected:  05/14/19 1314    Specimen:  Blood Updated:  05/14/19 1317     Glucose 128 mg/dL     POC Glucose Once [[id number]]  (Abnormal) Collected:  05/14/19 1056    Specimen:  Blood Updated:  05/14/19 1057     Glucose 132 mg/dL     POC Glucose Once [[id number]]  (Abnormal) Collected:  05/14/19 0730    Specimen:  Blood Updated:  05/14/19 0734     Glucose 162 mg/dL     Vitamin B12 [[id number]]  (Abnormal) Collected:  05/14/19 0451    Specimen:  Blood Updated:  05/14/19 0604     Vitamin B-12 >2,000 pg/mL     Folate [[id number]]  (Normal) Collected:  05/14/19 0451    Specimen:  Blood Updated:  05/14/19 0604     Folate 5.76 ng/mL     TSH [[id number]]  (Normal) Collected:  05/14/19 0451    Specimen:  Blood Updated:  05/14/19 0554     TSH 3.040 mIU/mL     Iron Profile [[id number]]  (Abnormal) Collected:  05/14/19 0451    Specimen:  Blood Updated:  05/14/19 0550     Iron 58 mcg/dL      Iron Saturation 17 %      Transferrin 228 mg/dL      TIBC 340 mcg/dL     Basic Metabolic Panel [[id number]]  (Abnormal) Collected:  05/14/19 0451     Specimen:  Blood Updated:  05/14/19 0550     Glucose 135 mg/dL      BUN 33 mg/dL      Creatinine 1.29 mg/dL      Sodium 134 mmol/L      Potassium 4.2 mmol/L      Chloride 100 mmol/L      CO2 26.0 mmol/L      Calcium 8.8 mg/dL      eGFR Non African Amer 54 mL/min/1.73      BUN/Creatinine Ratio 25.6     Anion Gap 8.0 mmol/L     Narrative:       GFR Normal >60  Chronic Kidney Disease <60  Kidney Failure <15    CBC (No Diff) [[id number]]  (Abnormal) Collected:  05/14/19 0451    Specimen:  Blood Updated:  05/14/19 0526     WBC 5.31 10*3/mm3      RBC 3.91 10*6/mm3      Hemoglobin 11.0 g/dL      Hematocrit 34.7 %      MCV 88.7 fL      MCH 28.1 pg      MCHC 31.7 g/dL      RDW 14.6 %      RDW-SD 46.8 fl      MPV 10.3 fL      Platelets 203 10*3/mm3     POC Glucose Once [[id number]]  (Abnormal) Collected:  05/13/19 2032    Specimen:  Blood Updated:  05/13/19 2035     Glucose 197 mg/dL     POC Glucose Once [[id number]]  (Abnormal) Collected:  05/13/19 1556    Specimen:  Blood Updated:  05/13/19 1557     Glucose 227 mg/dL     POC Glucose Once [[id number]]  (Abnormal) Collected:  05/13/19 1204    Specimen:  Blood Updated:  05/13/19 1205     Glucose 195 mg/dL         Data Review:  Results from last 7 days   Lab Units 05/17/19  0335 05/16/19  0350 05/15/19  0659   SODIUM mmol/L 140 140 136   POTASSIUM mmol/L 4.4 4.5 4.4   CHLORIDE mmol/L 104 104 101   CO2 mmol/L 27.5 23.6 28.8   BUN mg/dL 38* 35* 33*   CREATININE mg/dL 1.70* 1.70* 1.44*   GLUCOSE mg/dL 130* 178* 156*   CALCIUM mg/dL 8.5* 8.3* 8.3*     Results from last 7 days   Lab Units 05/17/19  0335 05/16/19  0350 05/15/19  0407   WBC 10*3/mm3 4.97 5.61 6.08   HEMOGLOBIN g/dL 9.2* 9.5* 9.6*   HEMATOCRIT % 30.0* 30.3* 31.7*   PLATELETS 10*3/mm3 172 171 172     Results from last 7 days   Lab Units 05/14/19  0451   TSH mIU/mL 3.040         Lab Results   Lab Value Date/Time    TROPONINT <0.010 05/08/2019 0554    TROPONINT <0.010 05/07/2019 1744               Invalid input(s): PROT,  LABALBU  Results from last 7 days   Lab Units 05/14/19  0451   TSH mIU/mL 3.040         Glucose   Date/Time Value Ref Range Status   05/17/2019 1105 199 (H) 70 - 130 mg/dL Final   05/17/2019 0614 126 70 - 130 mg/dL Final   05/16/2019 2104 215 (H) 70 - 130 mg/dL Final   05/16/2019 1534 146 (H) 70 - 130 mg/dL Final   05/16/2019 1112 195 (H) 70 - 130 mg/dL Final   05/16/2019 0601 168 (H) 70 - 130 mg/dL Final   05/15/2019 2145 207 (H) 70 - 130 mg/dL Final   05/15/2019 1623 202 (H) 70 - 130 mg/dL Final           Past Medical History:   Diagnosis Date   • Allergic rhinitis    • Bronchitis    • Coronary artery disease    • Diabetes mellitus (CMS/[facility]) 2001   • DM (diabetes mellitus) (CMS/HCC)    • Encounter for annual health examination 05/06/2014    Annual Health Assessment   • Gout    • Hiatal hernia    • History of MRSA infection     RIGHT FOOT 2010   • Hyperlipidemia    • Hypertension    • Murmur    • Pneumothorax on right    • Sleep apnea     pt wears CPAP at night   • Wellness examination 06/24/2015    Annual Wellness Visit       Assessment:  Active Hospital Problems    Diagnosis  POA   • **Rupture of left quadriceps tendon [S76.112A]  Yes   • Constipation [K59.00]  No   • Fall [W19.XXXA]  Yes   • Non-traumatic rhabdomyolysis [M62.82]  Yes   • 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       Plan:  Post op care. Will need SNU for rehab. Await precert. Continue current RX.    Merlin Egan MD  5/17/2019  12:50 PM

## 2019-05-17 NOTE — PLAN OF CARE
Problem: Patient Care Overview  Goal: Plan of Care Review  Outcome: Ongoing (interventions implemented as appropriate)   05/17/19 1103 05/17/19 1502   Coping/Psychosocial   Plan of Care Reviewed With patient --    Plan of Care Review   Progress improving --    OTHER   Outcome Summary --  pt POD 3 left quad tendon repair. VSS. Ace wrap, hinge brace and ice in place. pain is controlled minimal pain medication given. pt able to work with PT today, stood by side of bed, unable to take a step. Groin red, cream placed. awaiting precert for pt to be D/C'd to [facility] when precert obtained. Educated on the importance of Glucose monitoring and the use of medications as ordered for HO DM. verbalized understanding. will cont to monitor.      Goal: Individualization and Mutuality  Outcome: Ongoing (interventions implemented as appropriate)    Goal: Discharge Needs Assessment  Outcome: Ongoing (interventions implemented as appropriate)    Goal: Interprofessional Rounds/Family Conf  Outcome: Ongoing (interventions implemented as appropriate)      Problem: Fall Risk (Adult)  Goal: Identify Related Risk Factors and Signs and Symptoms  Outcome: Ongoing (interventions implemented as appropriate)    Goal: Absence of Fall  Outcome: Ongoing (interventions implemented as appropriate)      Problem: Pain, Acute (Adult)  Goal: Identify Related Risk Factors and Signs and Symptoms  Outcome: Ongoing (interventions implemented as appropriate)    Goal: Acceptable Pain Control/Comfort Level  Outcome: Ongoing (interventions implemented as appropriate)      Problem: Skin Injury Risk (Adult)  Goal: Identify Related Risk Factors and Signs and Symptoms  Outcome: Ongoing (interventions implemented as appropriate)    Goal: Skin Health and Integrity  Outcome: Ongoing (interventions implemented as appropriate)

## 2019-05-17 NOTE — PLAN OF CARE
Problem: Patient Care Overview  Goal: Plan of Care Review  Outcome: Ongoing (interventions implemented as appropriate)   05/17/19 1103   Coping/Psychosocial   Plan of Care Reviewed With patient   Plan of Care Review   Progress improving   OTHER   Outcome Summary pt with slow steady improvement with mobility, transferring to the side of bed with greater ease and pt did better with sit to stand today, cont to do well with sidesteps , has difficulty advancing RLE , pt with good effort today

## 2019-05-17 NOTE — PLAN OF CARE
Problem: Patient Care Overview  Goal: Plan of Care Review  Outcome: Ongoing (interventions implemented as appropriate)   05/17/19 0136   Coping/Psychosocial   Plan of Care Reviewed With patient   Plan of Care Review   Progress improving   OTHER   Outcome Summary vss, lle ace c/d/i, in hinged brace, neurovascular status unchanged, voiding well, reports adeuqate pain control, discussed importance of monitoring b/p due to hx hypertension,less redness to groins, scrotum and rodriguez area,  discharge plan is to [facility] when precert available, using bipap with oxygen at night, will continue to monitor,     Goal: Individualization and Mutuality  Outcome: Ongoing (interventions implemented as appropriate)    Goal: Discharge Needs Assessment  Outcome: Ongoing (interventions implemented as appropriate)    Goal: Interprofessional Rounds/Family Conf  Outcome: Ongoing (interventions implemented as appropriate)      Problem: Fall Risk (Adult)  Goal: Absence of Fall  Outcome: Ongoing (interventions implemented as appropriate)      Problem: Pain, Acute (Adult)  Goal: Acceptable Pain Control/Comfort Level  Outcome: Ongoing (interventions implemented as appropriate)      Problem: Skin Injury Risk (Adult)  Goal: Skin Health and Integrity  Outcome: Ongoing (interventions implemented as appropriate)

## 2019-05-17 NOTE — PROGRESS NOTES
[facility]  Cardiology Progress Note    Patient Identification:  Name: Rock Maciel Jr.  Age: 73 y.o.  Sex: male  :  1945  MRN: [id number]                 Follow Up / Chief Complaint: Cardiac clearance       Interval History: Patient awaiting pre-CERT for skilled nursing facility for rehab.  Working well with physical therapy.  Patient able to resume Plavix per orthopedics.  Electrolytes and hemoglobin stable.     Subjective: patient feeling well after surgery.  Denies chest pain and palpitations.  Looking forward to going to rehab.     Objective:    Past Medical History:  Past Medical History:   Diagnosis Date   • Allergic rhinitis    • Bronchitis    • Coronary artery disease    • Diabetes mellitus (CMS/[facility])    • DM (diabetes mellitus) (CMS/[facility])    • Encounter for annual health examination 2014    Annual Health Assessment   • Gout    • Hiatal hernia    • History of MRSA infection     RIGHT FOOT    • Hyperlipidemia    • Hypertension    • Murmur    • Pneumothorax on right    • Sleep apnea     pt wears CPAP at night   • Wellness examination 2015    Annual Wellness Visit     Past Surgical History:  Past Surgical History:   Procedure Laterality Date   • ARTERY SURGERY Bilateral     carotid   • CARDIAC CATHETERIZATION N/A 2018    Procedure: Left Heart Cath;  Surgeon: Jo Toney MD;  Location:  [facility];  Service: Cardiovascular   • CARDIAC CATHETERIZATION N/A 2018    Procedure: Coronary angiography;  Surgeon: Jo Toney MD;  Location:  [facility];  Service: Cardiovascular   • CAROTID ENDARTERECTOMY Bilateral    • COLONOSCOPY     • CORONARY ARTERY BYPASS GRAFT WITH AORTIC VALVE REPAIR/REPLACEMENT N/A 2018    Procedure: INTRAOPERATIVE ALEX, MIDLINE STERNOTOMY, CORONARY ARTERY BYPASS GRAFTING X 3 USING ENDOSCOPICALLY HARVESTED LEFT GREATER SAPHENOUS VEIN,  AORTIC VALVE REPLACEMENT USING 25MM LOPEZ  II ULTRA PORCINE VALVE, PRP;  Surgeon: Phong Posey MD;  Location: [facility] OR;  Service: Cardiothoracic   • FOOT SURGERY Right 2010    5th digit removal   • FOOT SURGERY Left 2011    1 digit removed   • QUADRICEPS TENDON REPAIR Left 5/14/2019    Procedure: Left QUADRICEPS TENDON REPAIR;  Surgeon: Camlio Hunter MD;  Location: [facility] OR;  Service: Orthopedics   • THORACENTESIS Right 11/21/2016   • THORACOSCOPY Right 5/8/2017    Procedure: BRONCHOSCOPY, RIGHT VAT,  TOTAL DECORTICATION RIGHT LUNG, PLEURAL BX, PLACEMENT SUBPLEURAL PAIN CAATHETERS X2;  Surgeon: Donald Orlando III, MD;  Location: [facility] OR;  Service:    • TONSILECTOMY, ADENOIDECTOMY, BILATERAL MYRINGOTOMY AND TUBES          Social History:   Social History     Tobacco Use   • Smoking status: Never Smoker   • Smokeless tobacco: Never Used   Substance Use Topics   • Alcohol use: Yes     Comment: either one glass wine or one glass liquor per  day      Family History:  Family History   Problem Relation Age of Onset   • Hypertension Father           Allergies:  Allergies   Allergen Reactions   • Ceclor [Cefaclor] Rash     Scheduled Meds:    amLODIPine 10 mg Daily   atenolol 25 mg Daily   atorvastatin 40 mg Nightly   glipiZIDE 5 mg BID AC   insulin lispro 0-7 Units 4x Daily With Meals & Nightly   miconazole nitrate  Q12H   polyethylene glycol 17 g Daily   sennosides-docusate sodium 2 tablet BID   sodium chloride 3 mL Q12H         I have reviewed patient's most recent medical history and current medications.        INTAKE AND OUTPUT:    Intake/Output Summary (Last 24 hours) at 5/17/2019 1644  Last data filed at 5/17/2019 1425  Gross per 24 hour   Intake 450 ml   Output 1150 ml   Net -700 ml       Review of Systems:   GI: Denies abdominal pain and n/v/d  Cardiac: Denies chest pain and palpitations  Pulmonary: Denies shortness of breath and cough    Constitutional:  Temp:  [97.6 °F (36.4 °C)-98.4 °F (36.9 °C)] 97.6 °F (36.4 °C)  Heart  Rate:  [50-72] 72  Resp:  [16] 16  BP: (123-160)/(56-68) 138/68    Physical Exam:   General: No acute distress; resting comfortably in bed eating dinner    Eyes: EOM normal and no conjunctival drainage.  HEENT: No JVD.  Thyroid not visibly enlarged.  No mucosal pallor or cyanosis.      Respiratory: respirations regular and unlabored at rest. BBS Rations regular and unlabored at rest. Diminished bases.  No crackles, rubs, or wheezes noted.      Cardiovascular: S1S2 regular rate and rhythm.  No murmur, rub, or gallop. DP/PT pulses 1+.  1+ pretibial pitting edema   Gastrointestinal: Soft, round, and nontender.  Bowel sounds present.  No ascites.  Musculoskeletal: MARSHALL x4. No abnormal movements- amputated LE digits-limited movement LLE d/t recent sx  Extremities: No digital clubbing or cyanosis.  Skin: Warm and dry to touch.     Neuro: AAO x 3 CN II-XII grossly intact, and answers appropriately.    Psych: Affect normal, pleasant, cooperative.        Cardiographics  Telemetry:   unavailable    EC/15/2019 sinus rhythm with RBBB and LAFP.  Rate 58       sinus rate rate 50s bradycardia, RBBB none similar to prior tracing.           2018 SR rate 70s RBBB        I Have reviewed and interpreted EKGs.    Echocardiogram:       Interpretation Summary     · Left ventricular systolic function is normal. Calculated EF = 64.0%. Estimated EF was in agreement with the calculated EF. Estimated EF = 64%. Normal left ventricular cavity size noted. All left ventricular wall segments contract normally. Left ventricular wall thickness is consistent with mild concentric hypertrophy. Left ventricular diastolic dysfunction is noted (grade II w/high LAP) consistent with pseudonormalization.  · Left atrial volume is borderline increased.  · There is a prosthetic aortic valve. No aortic valve stenosis is present. There is a bioprosthetic valve present. Hemodynamically significant regurgitation is present. There is moderate prosthetic  aortic valve regurgitation. Cannot tell from the study if this is paravalvular transvalvular in origin..  · Severe MAC is present. There is a nodular appearance to the mitral valve leaflets. Cannot rule out nodular sclerosis versus vegetative lesions.. There is severe bileaflet mitral valve thickening present. Mild mitral valve regurgitation is present.            7/2018  Interpretation Summary     · Limited echo performed  · Suboptimal scan  · LV function appears to be normal  · No pericardial effusion         5/2018  Interpretation Summary     · Left atrial cavity size is moderately dilated.  · Mild mitral valve regurgitation is present  · Mild tricuspid valve regurgitation is present.  · Calculated EF = 68%.  · There is no evidence of pericardial effusion.  · There is calcification of the aortic valve.  · Mild to moderate aortic valve stenosis is present.       Stress test  9/2018  Interpretation Summary        · Equivocal ECG evidence of myocardial ischemia.Negative clinical evidence of myocardial ischemia. Findings consistent with an equivocal ECG stress test. Submaximal Stress Test.  · . Asymptomatic for chest pain       Cardiac catheterization  7/2018  Conclusion        · Successful right and left coronary angiogram and LV pressures  · Normal left main  · Minimum diffuse left anterior descending irregularity with midportion 40-50% stenosis  · Circumflex artery has OM ostial 70% distal circumflex 60% stenosis  · Right coronary artery dominant with mid and distal portion 70% stenosis  · Severe aortic stenosis with gradient 60 mmHg               Lab Review           Results from last 7 days   Lab Units 05/17/19  0335   SODIUM mmol/L 140   POTASSIUM mmol/L 4.4   BUN mg/dL 38*   CREATININE mg/dL 1.70*   CALCIUM mg/dL 8.5*       Results from last 7 days   Lab Units 05/17/19  0335 05/16/19  0350 05/15/19  0407   WBC 10*3/mm3 4.97 5.61 6.08   HEMOGLOBIN g/dL 9.2* 9.5* 9.6*   HEMATOCRIT % 30.0* 30.3* 31.7*   PLATELETS  "10*3/mm3 172 171 172               Assessment/plan    1.  CAD: CABG July 2018.  Patient remains without chest pain or shortness of air. Will resume plavix as orthopedics have approved.     2.  Hypertension: Controlled    3.  Left quadricep repair: Patient awaiting pre-CERT and placement for skilled nursing rehab.    4.  Bradycardia: Improved since beta-blocker was decreased.  Heart rate in the 70s.         5.  Obstructive sleep apnea: Compliant with CPAP use    6.  Dyslipidemia: On statin therapy.  Continue.    Stable from CV standpoint, will see PRN over the weekend and resume care Monday am.     5/17/2019  Ivette Wills, ZEINA      EMR César/Transcription:   \"Dictated utilizing Dragon dictation\".   "

## 2019-05-17 NOTE — THERAPY TREATMENT NOTE
Acute Care - Physical Therapy Treatment Note  [facility]     Patient Name: Rock Maciel Jr.  : 1945  MRN: [id number]  Today's Date: 2019  Onset of Illness/Injury or Date of Surgery: 19  Date of Referral to PT: 19  Referring Physician: Azra Fox APRN    Admit Date: 2019    Visit Dx:    ICD-10-CM ICD-9-CM   1. Fall, initial encounter W19.XXXA E888.9   2. Generalized weakness R53.1 780.79   3. Left leg pain M79.605 729.5   4. 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 (CMS/HCC)   • Chronic venous insufficiency   • Nonrheumatic aortic valve stenosis   • Carotid stenosis, asymptomatic   • Bradycardia   • Hyperlipidemia   • Bilateral carotid artery disease (CMS/HCC)   • Abnormal cardiovascular stress test   • Renal insufficiency   • H/O aortic valve replacement with porcine valve   • Charcot-Davida-Tooth disease-like deformity of foot   • Amputated toe of right foot (CMS/HCC)   • Fall   • Non-traumatic rhabdomyolysis   • S/P CABG x 2   • CKD (chronic kidney disease) stage 3, GFR 30-59 ml/min (CMS/HCC)   • Rupture of left quadriceps tendon   • Constipation       Therapy Treatment    Rehabilitation Treatment Summary     Row Name 19 1040             Treatment Time/Intention    Discipline  physical therapist  -PC      Document Type  therapy note (daily note)  -PC      Subjective Information  no complaints  -PC       Mode of Treatment  physical therapy  -PC      Patient/Family Observations  pt is in bed, has brace on LLE locked in extension  -PC      Patient Effort  good  -PC      Existing Precautions/Restrictions  fall WBAT, knee brace locked in extension  -PC      Recorded by [PC] Marce Hall, PT 05/17/19 1045      Row Name 05/17/19 1040             Cognitive Assessment/Intervention- PT/OT    Orientation Status (Cognition)  oriented x 3  -PC      Follows Commands (Cognition)  WNL  -PC      Recorded by [PC] Marce Hall, PT 05/17/19 1045      Row Name 05/17/19 1040             Safety Issues, Functional Mobility    Impairments Affecting Function (Mobility)  balance;endurance/activity tolerance;pain;range of motion (ROM);strength  -PC      Recorded by [PC] Marce Hall, PT 05/17/19 1045      Row Name 05/17/19 1040             Bed Mobility Assessment/Treatment    Supine-Sit [place] (Bed Mobility)  minimum assist (75% patient effort);verbal cues  -PC      Sit-Supine [place] (Bed Mobility)  moderate assist (50% patient effort);2 person assist  -PC      Bed Mobility, Safety Issues  decreased use of legs for bridging/pushing  -PC      Assistive Device (Bed Mobility)  bed rails;head of bed elevated;overhead trapeze  -PC      Recorded by [PC] Marce Hall, PT 05/17/19 1045      Row Name 05/17/19 1040             Sit-Stand Transfer    Sit-Stand [place] (Transfers)  moderate assist (50% patient effort);2 person assist;verbal cues;minimum assist (75% patient effort)  -PC      Assistive Device (Sit-Stand Transfers)  walker, front-wheeled elevated bed surface  -PC2      Recorded by [PC] Marce Hall, PT 05/17/19 1103  [PC2] Marce Hall, PT 05/17/19 1045      Row Name 05/17/19 1040             Stand-Sit Transfer    Stand-Sit [place] (Transfers)  minimum assist (75% patient effort);2 person assist;verbal cues  -PC      Recorded by [PC] Marce Hall, PT 05/17/19 1045      Row Name 05/17/19 1040              Gait/Stairs Assessment/Training    [place] Level (Gait)  moderate assist (50% patient effort);2 person assist;verbal cues  -PC      Assistive Device (Gait)  walker, front-wheeled  -PC      Distance in Feet (Gait)  pt able to take 3-4 sidesteps, he scoots his RLE along, able to lift and advance LLE  -PC      Pattern (Gait)  step-to  -PC      Deviations/Abnormal Patterns (Gait)  antalgic;crispin decreased  -PC      Bilateral Gait Deviations  forward flexed posture  -PC      Comment (Gait/Stairs)  pt stood x 2, did better on the second time, pt has balance issues in standing due to B foot deformities  -PC2      Recorded by [PC] Marce Hall, PT 05/17/19 1045  [PC2] Marce Hall, PT 05/17/19 1103      Row Name 05/17/19 1040             Therapeutic Exercise    Comment (Therapeutic Exercise)  B LE AP, RLE QS  -PC      Recorded by [PC] Marce Hall, PT 05/17/19 1045      Row Name 05/17/19 1040             Static Standing Balance    Level of [place] (Supported Standing, Static Balance)  minimal assist, 75% patient effort;contact guard assist;2 person assist  -PC      Time Able to Maintain Position (Supported Standing, Static Balance)  1 to 2 minutes  -PC      Assistive Device Utilized (Supported Standing, Static Balance)  walker, rolling  -PC      Comment (Supported Standing, Static Balance)  worked on weight shifting in standing   -PC      Recorded by [PC] Marce Hall, PT 05/17/19 1045      Row Name 05/17/19 1040             Positioning and Restraints    Pre-Treatment Position  in bed  -PC      Post Treatment Position  bed  -PC      In Bed  supine;call light within reach;encouraged to call for assist  -PC      Recorded by [PC] Marce Hall, PT 05/17/19 1045      Row Name 05/17/19 1040             Pain Scale: Numbers Pre/Post-Treatment    Pain Location - Side  Left  -PC      Pain Location  knee  -PC      Recorded by [PC] Marce Hall, PT 05/17/19 1045      Row Name 05/17/19 1040              Pain Scale: Word Pre/Post-Treatment    Pain: Word Scale, Pretreatment  4 - moderate pain  -PC      Pain: Word Scale, Post-Treatment  4 - moderate pain  -PC      Recorded by [PC] Marce Hall, PT 05/17/19 1045      Row Name                Residual Limb Assessment 05/06/19 0900 other (see comments)    Residual Limb Assessment - Properties Group Date first assessed: 05/06/19 [LL] Time first assessed: 0900 [LL] Amputation Date: -- [LL], unknown  Location: other (see comments) [LL], Left great toe, Right small toes (4 &5)  Recorded by:  [LL] Ling Aleman RN 05/07/19 1218    Row Name                Wound 05/12/19 0900 Left anterior;upper thigh blisters    Wound - Properties Group Date first assessed: 05/12/19 [AE] Time first assessed: 0900 [AE] Present On Admission : picture taken [AE] Side: Left [AE] Orientation: anterior;upper [AE] Location: thigh [AE] Type: blisters [AE] Recorded by:  [AE] Nina Holman RN 05/12/19 1241    Row Name                Wound 05/14/19 1615 Left leg incision    Wound - Properties Group Date first assessed: 05/14/19 [MM] Time first assessed: 1615 [MM] Side: Left [MM] Location: leg [MM] Type: incision [MM] Recorded by:  [MM] Lorri Conley RN 05/14/19 1615    Row Name 05/17/19 1040             Plan of Care Review    Plan of Care Reviewed With  patient  -PC      Recorded by [PC] Marce Hall, PT 05/17/19 1103      Row Name 05/17/19 1040             Outcome Summary/Treatment Plan (PT)    Anticipated Discharge Disposition (PT)  skilled nursing facility  -PC      Recorded by [PC] Marce Hall, PT 05/17/19 1045        User Key  (r) = Recorded By, (t) = Taken By, (c) = Cosigned By    Initials Name Effective Dates Discipline    Nina Higgins RN 09/07/17 -  Nurse    PC Marce Hall, PT 04/03/18 -  PT    MM Lorri Conley RN 06/16/16 -  Nurse    LL Ling Aleman RN 07/13/18 -  Nurse          Wound 05/12/19 0900 Left anterior;upper thigh blisters (Active)    Dressing Appearance dry;intact;no drainage 5/16/2019  4:31 PM   Closure NIKKI 5/16/2019  4:31 PM   Base dressing in place, unable to visualize 5/16/2019  4:31 PM   Drainage Amount none 5/16/2019  4:31 PM       Wound 05/14/19 1615 Left leg incision (Active)   Dressing Appearance dry;intact;no drainage 5/17/2019  8:11 AM   Closure NIKKI 5/17/2019  8:11 AM   Base dressing in place, unable to visualize 5/17/2019  8:11 AM   Drainage Amount none 5/17/2019  8:11 AM   Dressing Care, Wound elastic bandage 5/16/2019  8:31 PM           Physical Therapy Education     Title: PT OT SLP Therapies (In Progress)     Topic: Physical Therapy (Done)     Point: Mobility training (Done)     Learning Progress Summary           Patient Acceptance, E,D, DU by PC at 5/17/2019 11:03 AM    Acceptance, E,D, VU,NR by EE at 5/16/2019  4:34 PM    Acceptance, E, VU by MS at 5/15/2019  1:22 PM    Acceptance, E,TB, VU,DU by CW at 5/13/2019  2:39 PM    Acceptance, E,TB, VU,DU by CW at 5/10/2019  3:28 PM    Acceptance, E,TB, VU,DU by CW at 5/9/2019  1:34 PM    Acceptance, E,TB, VU,DU by CW at 5/8/2019 10:50 AM    Acceptance, E,D, VU,DU by  at 5/7/2019 11:36 AM    Acceptance, E, NR by CA at 5/6/2019 10:55 AM                   Point: Home exercise program (Done)     Learning Progress Summary           Patient Acceptance, E,D, DU by PC at 5/17/2019 11:03 AM    Acceptance, E,D, VU,NR by EE at 5/16/2019  4:34 PM    Acceptance, E, VU by MS at 5/15/2019  1:22 PM    Acceptance, E,TB, VU,DU by CW at 5/13/2019  2:39 PM    Acceptance, E,TB, VU,DU by CW at 5/10/2019  3:28 PM    Acceptance, E,TB, VU,DU by CW at 5/9/2019  1:34 PM    Acceptance, E,TB, VU,DU by CW at 5/8/2019 10:50 AM    Acceptance, E,D, VU,DU by  at 5/7/2019 11:36 AM    AcceptanceMEREDITH NR by CA at 5/6/2019 10:55 AM                   Point: Body mechanics (Done)     Learning Progress Summary           Patient Acceptance, JOSE DE JESUS HARPER DU by PC at 5/17/2019 11:03 AM    AcceptanceMEREDITH D, VU, NR by EDY at 5/16/2019   4:34 PM    Acceptance, E, VU by MS at 5/15/2019  1:22 PM    Acceptance, E,TB, VU,DU by CW at 5/13/2019  2:39 PM    Acceptance, E,TB, VU,DU by CW at 5/10/2019  3:28 PM    Acceptance, E,TB, VU,DU by CW at 5/9/2019  1:34 PM    Acceptance, E,TB, VU,DU by CW at 5/8/2019 10:50 AM    Acceptance, E,D, VU,DU by  at 5/7/2019 11:36 AM    Acceptance, E, NR by CA at 5/6/2019 10:55 AM                   Point: Precautions (Done)     Learning Progress Summary           Patient Acceptance, E,D, DU by PC at 5/17/2019 11:03 AM    Acceptance, E,D, VU,NR by  at 5/16/2019  4:34 PM    Acceptance, E, VU by MS at 5/15/2019  1:22 PM    Acceptance, E,TB, VU,DU by CW at 5/13/2019  2:39 PM    Acceptance, E,TB, VU,DU by CW at 5/10/2019  3:28 PM    Acceptance, E,TB, VU,DU by CW at 5/9/2019  1:34 PM    Acceptance, E,TB, VU,DU by CW at 5/8/2019 10:50 AM    Acceptance, E,D, VU,DU by  at 5/7/2019 11:36 AM    Acceptance, E, NR by CA at 5/6/2019 10:55 AM                               User Key     Initials Effective Dates Name Provider Type Discipline    PC 04/03/18 -  Marce Hall, PT Physical Therapist PT     04/03/18 -  Madelin Gil, PT Physical Therapist PT    MS 03/04/19 -  Lorri Gallego, PT Physical Therapist PT    CW 03/07/18 -  Andi Sorensen, PTA Physical Therapy Assistant PT     08/19/18 -  Henrietta Barker PTA Physical Therapy Assistant PT    CA 06/13/18 -  Ayanna Mar, PT Physical Therapist PT                PT Recommendation and Plan  Anticipated Discharge Disposition (PT): skilled nursing facility  Outcome Summary/Treatment Plan (PT)  Anticipated Discharge Disposition (PT): skilled nursing facility  Plan of Care Reviewed With: patient  Progress: improving  Outcome Summary: pt with slow steady improvement with mobility, transferring to the side of bed with greater ease and pt did better with sit to stand today, cont to do well with sidesteps , has difficulty advancing RLE , pt with good effort today  Outcome  Measures     Row Name 05/17/19 1100 05/16/19 1600 05/15/19 1300       How much help from another person do you currently need...    Turning from your back to your side while in flat bed without using bedrails?  3  -PC  3  -EE  3  -MS    Moving from lying on back to sitting on the side of a flat bed without bedrails?  3  -PC  3  -EE  2  -MS    Moving to and from a bed to a chair (including a wheelchair)?  2  -PC  2  -EE  1  -MS    Standing up from a chair using your arms (e.g., wheelchair, bedside chair)?  2  -PC  2  -EE  2  -MS    Climbing 3-5 steps with a railing?  1  -PC  1  -EE  1  -MS    To walk in hospital room?  2  -PC  1  -EE  1  -MS    AM-PAC 6 Clicks Score  13  -PC  12  -EE  10  -MS       Functional Assessment    Outcome Measure Options  --  AM-PAC 6 Clicks Basic Mobility (PT)  -EE  AM-PAC 6 Clicks Basic Mobility (PT)  -MS      User Key  (r) = Recorded By, (t) = Taken By, (c) = Cosigned By    Initials Name Provider Type    PC Marce Hall, PT Physical Therapist    Madelin Phan, PT Physical Therapist    Lorri Olivarez, PT Physical Therapist         Time Calculation:   PT Charges     Row Name 05/17/19 1106             Time Calculation    Start Time  1012  -PC      Stop Time  1035  -PC      Time Calculation (min)  23 min  -PC      PT Received On  05/17/19  -PC      PT - Next Appointment  05/18/19  -PC        User Key  (r) = Recorded By, (t) = Taken By, (c) = Cosigned By    Initials Name Provider Type     Marce Hall, PT Physical Therapist        Therapy Charges for Today     Code Description Service Date Service Provider Modifiers Qty    03792090110 HC PT THER PROC EA 15 MIN 5/17/2019 Marce Hall, PT GP 2    60212194760 HC PT THER SUPP EA 15 MIN 5/17/2019 Marce Hall, PT GP 2          PT G-Codes  Outcome Measure Options: AM-PAC 6 Clicks Basic Mobility (PT)  AM-PAC 6 Clicks Score: 13    Marce Hall PT  5/17/2019

## 2019-05-18 VITALS
RESPIRATION RATE: 16 BRPM | DIASTOLIC BLOOD PRESSURE: 64 MMHG | BODY MASS INDEX: 41.75 KG/M2 | SYSTOLIC BLOOD PRESSURE: 130 MMHG | HEART RATE: 67 BPM | HEIGHT: 73 IN | OXYGEN SATURATION: 95 % | TEMPERATURE: 98.4 F | WEIGHT: 315 LBS

## 2019-05-18 LAB
ANION GAP SERPL CALCULATED.3IONS-SCNC: 10.8 MMOL/L
BASOPHILS # BLD AUTO: 0.02 10*3/MM3 (ref 0–0.2)
BASOPHILS NFR BLD AUTO: 0.4 % (ref 0–1.5)
BUN BLD-MCNC: 32 MG/DL (ref 8–23)
BUN/CREAT SERPL: 21.2 (ref 7–25)
CALCIUM SPEC-SCNC: 8.8 MG/DL (ref 8.6–10.5)
CHLORIDE SERPL-SCNC: 103 MMOL/L (ref 98–107)
CO2 SERPL-SCNC: 25.2 MMOL/L (ref 22–29)
CREAT BLD-MCNC: 1.51 MG/DL (ref 0.76–1.27)
DEPRECATED RDW RBC AUTO: 50.2 FL (ref 37–54)
EOSINOPHIL # BLD AUTO: 0.4 10*3/MM3 (ref 0–0.4)
EOSINOPHIL NFR BLD AUTO: 8 % (ref 0.3–6.2)
ERYTHROCYTE [DISTWIDTH] IN BLOOD BY AUTOMATED COUNT: 15 % (ref 12.3–15.4)
GFR SERPL CREATININE-BSD FRML MDRD: 45 ML/MIN/1.73
GLUCOSE BLD-MCNC: 95 MG/DL (ref 65–99)
GLUCOSE BLDC GLUCOMTR-MCNC: 143 MG/DL (ref 70–130)
GLUCOSE BLDC GLUCOMTR-MCNC: 159 MG/DL (ref 70–130)
GLUCOSE BLDC GLUCOMTR-MCNC: 198 MG/DL (ref 70–130)
GLUCOSE BLDC GLUCOMTR-MCNC: 95 MG/DL (ref 70–130)
HCT VFR BLD AUTO: 28.9 % (ref 37.5–51)
HGB BLD-MCNC: 8.8 G/DL (ref 13–17.7)
IMM GRANULOCYTES # BLD AUTO: 0.02 10*3/MM3 (ref 0–0.05)
IMM GRANULOCYTES NFR BLD AUTO: 0.4 % (ref 0–0.5)
LYMPHOCYTES # BLD AUTO: 1.05 10*3/MM3 (ref 0.7–3.1)
LYMPHOCYTES NFR BLD AUTO: 21.1 % (ref 19.6–45.3)
MCH RBC QN AUTO: 28.2 PG (ref 26.6–33)
MCHC RBC AUTO-ENTMCNC: 30.4 G/DL (ref 31.5–35.7)
MCV RBC AUTO: 92.6 FL (ref 79–97)
MONOCYTES # BLD AUTO: 0.34 10*3/MM3 (ref 0.1–0.9)
MONOCYTES NFR BLD AUTO: 6.8 % (ref 5–12)
NEUTROPHILS # BLD AUTO: 3.15 10*3/MM3 (ref 1.7–7)
NEUTROPHILS NFR BLD AUTO: 63.3 % (ref 42.7–76)
NRBC BLD AUTO-RTO: 0.2 /100 WBC (ref 0–0.2)
PLATELET # BLD AUTO: 174 10*3/MM3 (ref 140–450)
PMV BLD AUTO: 10.3 FL (ref 6–12)
POTASSIUM BLD-SCNC: 4.3 MMOL/L (ref 3.5–5.2)
RBC # BLD AUTO: 3.12 10*6/MM3 (ref 4.14–5.8)
SODIUM BLD-SCNC: 139 MMOL/L (ref 136–145)
WBC NRBC COR # BLD: 4.98 10*3/MM3 (ref 3.4–10.8)

## 2019-05-18 PROCEDURE — 99232 SBSQ HOSP IP/OBS MODERATE 35: CPT | Performed by: INTERNAL MEDICINE

## 2019-05-18 PROCEDURE — 97110 THERAPEUTIC EXERCISES: CPT | Performed by: PHYSICAL THERAPIST

## 2019-05-18 PROCEDURE — 80048 BASIC METABOLIC PNL TOTAL CA: CPT | Performed by: HOSPITALIST

## 2019-05-18 PROCEDURE — 82962 GLUCOSE BLOOD TEST: CPT

## 2019-05-18 PROCEDURE — 85025 COMPLETE CBC W/AUTO DIFF WBC: CPT | Performed by: HOSPITALIST

## 2019-05-18 PROCEDURE — 63710000001 INSULIN LISPRO (HUMAN) PER 5 UNITS: Performed by: NURSE PRACTITIONER

## 2019-05-18 RX ORDER — ACETAMINOPHEN 325 MG/1
650 TABLET ORAL EVERY 4 HOURS PRN
Start: 2019-05-18

## 2019-05-18 RX ORDER — AMLODIPINE BESYLATE 10 MG/1
10 TABLET ORAL DAILY
Start: 2019-05-19 | End: 2020-03-19 | Stop reason: SDUPTHER

## 2019-05-18 RX ORDER — HYDROCODONE BITARTRATE AND ACETAMINOPHEN 7.5; 325 MG/1; MG/1
2 TABLET ORAL EVERY 4 HOURS PRN
Qty: 40 TABLET | Refills: 0 | Status: SHIPPED | OUTPATIENT
Start: 2019-05-18 | End: 2019-05-24

## 2019-05-18 RX ORDER — BISACODYL 10 MG
10 SUPPOSITORY, RECTAL RECTAL DAILY PRN
Start: 2019-05-18 | End: 2021-02-08

## 2019-05-18 RX ORDER — ATENOLOL 25 MG/1
25 TABLET ORAL DAILY
Start: 2019-05-19 | End: 2020-03-19 | Stop reason: SDUPTHER

## 2019-05-18 RX ORDER — LACTULOSE 10 G/15ML
10 SOLUTION ORAL 2 TIMES DAILY PRN
Start: 2019-05-18 | End: 2020-08-04

## 2019-05-18 RX ORDER — SENNA AND DOCUSATE SODIUM 50; 8.6 MG/1; MG/1
2 TABLET, FILM COATED ORAL 2 TIMES DAILY
Start: 2019-05-18 | End: 2019-10-11

## 2019-05-18 RX ADMIN — ATORVASTATIN CALCIUM 40 MG: 20 TABLET, FILM COATED ORAL at 21:25

## 2019-05-18 RX ADMIN — SODIUM CHLORIDE, PRESERVATIVE FREE 3 ML: 5 INJECTION INTRAVENOUS at 09:07

## 2019-05-18 RX ADMIN — HYDROCODONE BITARTRATE AND ACETAMINOPHEN 2 TABLET: 7.5; 325 TABLET ORAL at 19:18

## 2019-05-18 RX ADMIN — HYDROCODONE BITARTRATE AND ACETAMINOPHEN 2 TABLET: 7.5; 325 TABLET ORAL at 06:45

## 2019-05-18 RX ADMIN — GLIPIZIDE 5 MG: 5 TABLET ORAL at 17:49

## 2019-05-18 RX ADMIN — AMLODIPINE BESYLATE 10 MG: 10 TABLET ORAL at 09:06

## 2019-05-18 RX ADMIN — GLIPIZIDE 5 MG: 5 TABLET ORAL at 06:44

## 2019-05-18 RX ADMIN — INSULIN LISPRO 2 UNITS: 100 INJECTION, SOLUTION INTRAVENOUS; SUBCUTANEOUS at 21:31

## 2019-05-18 RX ADMIN — SENNOSIDES AND DOCUSATE SODIUM 2 TABLET: 8.6; 5 TABLET ORAL at 09:05

## 2019-05-18 RX ADMIN — ATENOLOL 25 MG: 25 TABLET ORAL at 09:06

## 2019-05-18 RX ADMIN — INSULIN LISPRO 2 UNITS: 100 INJECTION, SOLUTION INTRAVENOUS; SUBCUTANEOUS at 12:05

## 2019-05-18 RX ADMIN — CLOPIDOGREL 75 MG: 75 TABLET, FILM COATED ORAL at 09:06

## 2019-05-18 RX ADMIN — HYDROCODONE BITARTRATE AND ACETAMINOPHEN 2 TABLET: 7.5; 325 TABLET ORAL at 14:16

## 2019-05-18 NOTE — PLAN OF CARE
Problem: Patient Care Overview  Goal: Plan of Care Review  Outcome: Ongoing (interventions implemented as appropriate)   05/18/19 0220   Coping/Psychosocial   Plan of Care Reviewed With patient   Plan of Care Review   Progress improving   OTHER   Outcome Summary vss, dsg c/d/i, hinged brace intact, neurovascular status wnl, voiding well, had bm, educated on monitoring BG, discharge to rehab pending, will continue to monitor      Goal: Individualization and Mutuality  Outcome: Ongoing (interventions implemented as appropriate)    Goal: Discharge Needs Assessment  Outcome: Ongoing (interventions implemented as appropriate)    Goal: Interprofessional Rounds/Family Conf  Outcome: Ongoing (interventions implemented as appropriate)      Problem: Fall Risk (Adult)  Goal: Absence of Fall  Outcome: Ongoing (interventions implemented as appropriate)      Problem: Pain, Acute (Adult)  Goal: Acceptable Pain Control/Comfort Level  Outcome: Ongoing (interventions implemented as appropriate)      Problem: Skin Injury Risk (Adult)  Goal: Skin Health and Integrity  Outcome: Ongoing (interventions implemented as appropriate)

## 2019-05-18 NOTE — PLAN OF CARE
Problem: Patient Care Overview  Goal: Plan of Care Review   05/18/19 1018   OTHER   Outcome Summary Patient tires/fatigues quickly with activity. He required mod A x 2 for STS and sidestepping toward HOB - performed 3-4 steps to left x 2 with seated rest in between.  Patient reports feeling like he is going to fall backward at times. Not yet ready for bed to chair. Encouraged R LE bed exercise t/o day.

## 2019-05-18 NOTE — PROGRESS NOTES
Continued Stay Note  [facility]     Patient Name: Rock Maciel Jr.  MRN: [id number]  Today's Date: 5/18/2019    Admit Date: 5/5/2019    Discharge Plan     Row Name 05/18/19 0757       Plan    Plan  [facility] [place] precert obtained on 5/17/19 pm and bed will be available on 5/18/19 after 12 Noon     Plan Comments  Voicemail from [facility] from 5/1719 stating [place] precert was obtained on 5/17/19pm and bed will be available for patient at [facility] after 12 Noon on 5/18/19.  Informed GABO Shearer that [place] precert was obtained and bed available today after 12 Noon.... Denisa Ferrera RN,CCP         Discharge Codes    No documentation.             Denisa Ferrera RN

## 2019-05-18 NOTE — PROGRESS NOTES
"Patient Name: Rock Maciel Jr.  :1945  73 y.o.      Patient Care Team:  Karen Red MD as PCP - General (Family Medicine)  Azam Banegas Jr., MD as Consulting Physician (Cardiology)    Interval History:   Awaiting precertification from the [facility] nursing facility    Subjective:  Following for preoperative evaluation status post coronary artery disease.    Objective   Vital Signs  Temp:  [96.7 °F (35.9 °C)-97.9 °F (36.6 °C)] 97 °F (36.1 °C)  Heart Rate:  [50-72] 58  Resp:  [14-16] 16  BP: (106-171)/(40-77) 148/73    Intake/Output Summary (Last 24 hours) at 2019 1036  Last data filed at 2019 0300  Gross per 24 hour   Intake 330 ml   Output 1350 ml   Net -1020 ml     Flowsheet Rows      First Filed Value   Admission Height  185.4 cm (73\") Documented at 2019 2310   Admission Weight  141 kg (311 lb)  (Abnormal)  Documented at 2019 0000          Physical Exam:   General Appearance:    Alert, cooperative, in no acute distress   Lungs:     Clear to auscultation.  Normal respiratory effort and rate.      Heart:    Regular rhythm and normal rate, normal S1 and S2, no murmurs, gallops or rubs.     Chest Wall:    No abnormalities observed   Abdomen:     Soft, nontender, positive bowel sounds.     Extremities:   no cyanosis, clubbing or edema.  No marked joint deformities.  Adequate musculoskeletal strength.       Results Review:    Results from last 7 days   Lab Units 19  0402   SODIUM mmol/L 139   POTASSIUM mmol/L 4.3   CHLORIDE mmol/L 103   CO2 mmol/L 25.2   BUN mg/dL 32*   CREATININE mg/dL 1.51*   GLUCOSE mg/dL 95   CALCIUM mg/dL 8.8         Results from last 7 days   Lab Units 19  0402   WBC 10*3/mm3 4.98   HEMOGLOBIN g/dL 8.8*   HEMATOCRIT % 28.9*   PLATELETS 10*3/mm3 174                         Medication Review:     amLODIPine 10 mg Oral Daily   atenolol 25 mg Oral Daily   atorvastatin 40 mg Oral Nightly   clopidogrel 75 mg Oral Daily   glipiZIDE 5 mg Oral BID AC "   insulin lispro 0-7 Units Subcutaneous 4x Daily With Meals & Nightly   miconazole nitrate  Topical Q12H   polyethylene glycol 17 g Oral Daily   sennosides-docusate sodium 2 tablet Oral BID   sodium chloride 3 mL Intravenous Q12H             Assessment/Plan     This is a patient with a history of coronary artery disease and bypass surgery in July 2018.  He is status post a left quadriceps repair.  Plan is to go to skilled nursing facility.  His beta-blocker had to be decreased because of bradycardia.  Otherwise his medications have been stable.  Vital signs stable.  He is anemic.  Okay with discharge from a cardiac standpoint.  We will sign off.  Follow-up with his usual cardiologist at his regularly scheduled appointment    Sharita Rubio MD, [facility]  05/18/19  10:36 AM

## 2019-05-18 NOTE — DISCHARGE SUMMARY
PHYSICIAN DISCHARGE SUMMARY                                                                        [facility]    Patient Identification:  Name: Rock Maciel Jr.  Age: 73 y.o.  Sex: male  :  1945  MRN: [id number]  Primary Care Physician: Karen Red MD    Admit date: 2019  Discharge date and time:2019  Discharged Condition: good    Discharge Diagnoses:  Active Hospital Problems    Diagnosis  POA   • **Rupture of left quadriceps tendon [S76.112A]  Yes   • Constipation [K59.00]  No   • Fall [W19.XXXA]  Yes   • Non-traumatic rhabdomyolysis [M62.82]  Yes   • S/P CABG x 2 [Z95.1]  Not Applicable   • CKD (chronic kidney disease) stage 3, GFR 30-59 ml/min (CMS/[facility]) [N18.3]  Yes   • H/O aortic valve replacement with porcine valve [Z95.3]  Not Applicable   • Essential hypertension [I10]  Yes   • Type 2 diabetes mellitus with circulatory disorder, without long-term current use of insulin (CMS/[facility]) [E11.59]  Yes   • SHASTA (obstructive sleep apnea) [G47.33]  Yes      Resolved Hospital Problems   No resolved problems to display.          PMHX:   Past Medical History:   Diagnosis Date   • Allergic rhinitis    • Bronchitis    • Coronary artery disease    • Diabetes mellitus (CMS/[facility])    • DM (diabetes mellitus) (CMS/[facility])    • Encounter for annual health examination 2014    Annual Health Assessment   • Gout    • Hiatal hernia    • History of MRSA infection     RIGHT FOOT    • Hyperlipidemia    • Hypertension    • Murmur    • Pneumothorax on right    • Sleep apnea     pt wears CPAP at night   • Wellness examination 2015    Annual Wellness Visit     PSHX:   Past Surgical History:   Procedure Laterality Date   • ARTERY SURGERY Bilateral     carotid   • CARDIAC CATHETERIZATION N/A 2018    Procedure: Left Heart Cath;  Surgeon: Jo Toney MD;  Location: [facility] CATH INVASIVE LOCATION;  Service:  Cardiovascular   • CARDIAC CATHETERIZATION N/A 7/20/2018    Procedure: Coronary angiography;  Surgeon: Jo Toney MD;  Location: [facility] CATH INVASIVE LOCATION;  Service: Cardiovascular   • CAROTID ENDARTERECTOMY Bilateral    • COLONOSCOPY  2008   • CORONARY ARTERY BYPASS GRAFT WITH AORTIC VALVE REPAIR/REPLACEMENT N/A 7/23/2018    Procedure: INTRAOPERATIVE ALEX, MIDLINE STERNOTOMY, CORONARY ARTERY BYPASS GRAFTING X 3 USING ENDOSCOPICALLY HARVESTED LEFT GREATER SAPHENOUS VEIN,  AORTIC VALVE REPLACEMENT USING 25MM LOPEZ II ULTRA PORCINE VALVE, PRP;  Surgeon: Phong Posey MD;  Location: [facility] OR;  Service: Cardiothoracic   • FOOT SURGERY Right 2010    5th digit removal   • FOOT SURGERY Left 2011    1 digit removed   • QUADRICEPS TENDON REPAIR Left 5/14/2019    Procedure: Left QUADRICEPS TENDON REPAIR;  Surgeon: Camilo Hunter MD;  Location: [facility] OR;  Service: Orthopedics   • THORACENTESIS Right 11/21/2016   • THORACOSCOPY Right 5/8/2017    Procedure: BRONCHOSCOPY, RIGHT VAT,  TOTAL DECORTICATION RIGHT LUNG, PLEURAL BX, PLACEMENT SUBPLEURAL PAIN CAATHETERS X2;  Surgeon: Donald Orlando III, MD;  Location: [facility] OR;  Service:    • TONSILECTOMY, ADENOIDECTOMY, BILATERAL MYRINGOTOMY AND TUBES         Hospital Course: Rock Maciel Jr.  is a 73 y.o. non-smoker with a history of HTN, Type 2 DM, AV stenosis s/p porcine valve replacement, CAD s/p CABG x2 2018 with preserved LVEF, and CKD stage 3 that presents to [facility] complaining of left leg pain following a mechanical fall caused by tripping over luggage.  This occurred in his basement.  He landed on this left knee on a concrete floor.  He did not lose consciousness or hit his head.  He noted that he could not get up following this but did manage to crawl to a phone to call 911.  He was down for about 9-10 hours.  He was unable to bear weight. He had no significant pain at rest.  There was no back pain or LE  numbness.         The patient was admitted to the hospital and seen by orthopedic surgery, cardiology and pulmonary.  The patient had adjustment of some of his medications and underwent surgery for repair of left quadriceps tendon.  Postoperatively the patient was still pretty weak and not able to move much.  The plan is to go to a skilled nursing facility for rehab and he will follow-up with orthopedic surgeon in couple weeks.  He is in a knee immobilizer.  He will follow-up with his primary care physician after released from rehab.    Consults:     Consults     Date and Time Order Name Status Description    5/9/2019 1035 Inpatient Pulmonology Consult Completed     5/7/2019 1526 Inpatient Cardiology Consult Completed     5/7/2019 0916 Inpatient Orthopedic Surgery Consult      5/6/2019 0040 [facility] (on-call MD unless specified) Completed         Results from last 7 days   Lab Units 05/18/19  0402   WBC 10*3/mm3 4.98   HEMOGLOBIN g/dL 8.8*   HEMATOCRIT % 28.9*   PLATELETS 10*3/mm3 174     Results from last 7 days   Lab Units 05/18/19  0402   SODIUM mmol/L 139   POTASSIUM mmol/L 4.3   CHLORIDE mmol/L 103   CO2 mmol/L 25.2   BUN mg/dL 32*   CREATININE mg/dL 1.51*   GLUCOSE mg/dL 95   CALCIUM mg/dL 8.8     Significant Diagnostic Studies:   Lab Results   Component Value Date    WBC 4.98 05/18/2019    HGB 8.8 (L) 05/18/2019    HCT 28.9 (L) 05/18/2019     05/18/2019     Lab Results   Component Value Date     05/18/2019    K 4.3 05/18/2019     05/18/2019    CO2 25.2 05/18/2019    BUN 32 (H) 05/18/2019    CREATININE 1.51 (H) 05/18/2019    GLUCOSE 95 05/18/2019     Lab Results   Component Value Date    CALCIUM 8.8 05/18/2019     No results found for: AST, ALT, ALKPHOS  No results found for: APTT, INR  No results found for: COLORU, CLARITYU, SPECGRAV, PHUR, PROTEINUR, GLUCOSEU, KETONESU, BLOODU, NITRITE, LEUKOCYTESUR, BILIRUBINUR, UROBILINOGEN, RBCUA, WBCUA, BACTERIA, UACOMMENT  No results found for: TROPONINT,  TROPONINI, BNP  No components found for: HGBA1C;2  No components found for: TSH;2  Imaging Results (all)     Procedure Component Value Units Date/Time    CT Lower Extremity Left Without Contrast [[id number]] Collected:  05/06/19 1349     Updated:  05/06/19 1409    Narrative:       CT LEFT KNEE WITHOUT CONTRAST     HISTORY: Fall on left knee. Suspected fracture.     TECHNIQUE: CT includes axial imaging through the left knee and this data  was reconstructed in coronal and sagittal planes.     COMPARISON: Left femur x-rays 05/06/2019.     FINDINGS: Above the patella there are foci of ossification that are  suspected to represent foci of cortical avulsion or avulsed spurs at the  quadriceps tendon insertion. This also needs correlation with clinical  data. The majority of the quadriceps tendon appears to insert onto these  fragments. There is also suprapatellar effusion and there is  extracapsular extension of fluid in this region with localized soft  tissue thickening. Additionally, the patella is somewhat low-lying and  the patellar tendon exhibits undulating configuration supporting  extensor mechanism failure.     Osteoarthritis is present greatest at the medial compartment. There is  osteochondral lesion measuring 19 x 7 mm at the weightbearing surface of  the medial femoral condyle and there has potentially been previous  surgery to this region.. Overlying cortical thinning and irregularity is  present. There is joint joint space narrowing with marginal spur  formation. Widening is present at the lateral compartment. There is  patellofemoral arthritis. Atherosclerotic calcification is present  involving the popliteal artery.       Impression:       1. 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 Patellar tendon exhibits undulating  configuration and the patella is somewhat low-lying.   2. Osteoarthritis with medial compartment joint space narrowing. Chronic  osteochondral lesion weightbearing surface medial femoral condyle.     Radiation dose reduction techniques were utilized, including automated  exposure control and exposure modulation based on body size.     This report was finalized on 5/6/2019 2:06 PM by Dr. Freddy Guzman M.D.       XR Femur 2 View Left [[id number]] Collected:  05/06/19 0036     Updated:  05/06/19 0043    Narrative:       6 VIEWS LEFT FEMUR     HISTORY: Left femur pain after a fall about 6 hours ago.     COMPARISON: None available.     FINDINGS:  The exam is degraded by patient positioning. No obvious acute fracture  or subluxation of the left femur is seen. There is some fullness within  the suprapatellar pouch which may reflect an effusion. No aggressive  osseous abnormalities are seen. Dense atherosclerotic calcification of  the femoral vasculature is noted. There may also be some focal soft  tissue swelling within the suprapatellar region.       Impression:       No definite acute fracture or subluxation identified, although I think  the patient may have a small suprapatellar effusion, as well as some  overlying soft tissue swelling within the suprapatellar region.     This report was finalized on 5/6/2019 12:39 AM by Dr. Inocencia Cruz M.D.           Lab Results (last 7 days)     Procedure Component Value Units Date/Time    POC Glucose Once [[id number]]  (Normal) Collected:  05/18/19 0600    Specimen:  Blood Updated:  05/18/19 0601     Glucose 95 mg/dL     Basic Metabolic Panel [[id number]]  (Abnormal) Collected:  05/18/19 0402    Specimen:  Blood Updated:  05/18/19 0452     Glucose 95 mg/dL      BUN 32 mg/dL      Creatinine 1.51 mg/dL      Sodium 139  mmol/L      Potassium 4.3 mmol/L      Chloride 103 mmol/L      CO2 25.2 mmol/L      Calcium 8.8 mg/dL      eGFR Non African Amer 45 mL/min/1.73      BUN/Creatinine Ratio 21.2     Anion Gap 10.8 mmol/L     Narrative:       GFR Normal >60  Chronic Kidney Disease <60  Kidney Failure <15    CBC & Differential [[id number]] Collected:  05/18/19 0402    Specimen:  Blood Updated:  05/18/19 0428    Narrative:       The following orders were created for panel order CBC & Differential.  Procedure                               Abnormality         Status                     ---------                               -----------         ------                     CBC Auto Differential[[id number]]        Abnormal            Final result                 Please view results for these tests on the individual orders.    CBC Auto Differential [[id number]]  (Abnormal) Collected:  05/18/19 0402    Specimen:  Blood Updated:  05/18/19 0428     WBC 4.98 10*3/mm3      RBC 3.12 10*6/mm3      Hemoglobin 8.8 g/dL      Hematocrit 28.9 %      MCV 92.6 fL      MCH 28.2 pg      MCHC 30.4 g/dL      RDW 15.0 %      RDW-SD 50.2 fl      MPV 10.3 fL      Platelets 174 10*3/mm3      Neutrophil % 63.3 %      Lymphocyte % 21.1 %      Monocyte % 6.8 %      Eosinophil % 8.0 %      Basophil % 0.4 %      Immature Grans % 0.4 %      Neutrophils, Absolute 3.15 10*3/mm3      Lymphocytes, Absolute 1.05 10*3/mm3      Monocytes, Absolute 0.34 10*3/mm3      Eosinophils, Absolute 0.40 10*3/mm3      Basophils, Absolute 0.02 10*3/mm3      Immature Grans, Absolute 0.02 10*3/mm3      nRBC 0.2 /100 WBC     POC Glucose Once [[id number]]  (Abnormal) Collected:  05/17/19 2136    Specimen:  Blood Updated:  05/17/19 2138     Glucose 140 mg/dL     POC Glucose Once [[id number]]  (Abnormal) Collected:  05/17/19 1632    Specimen:  Blood Updated:  05/17/19 1644     Glucose 162 mg/dL     POC Glucose Once [[id number]]  (Abnormal) Collected:  05/17/19 1105    Specimen:  Blood Updated:  05/17/19  1107     Glucose 199 mg/dL     POC Glucose Once [[id number]]  (Normal) Collected:  05/17/19 0614    Specimen:  Blood Updated:  05/17/19 0616     Glucose 126 mg/dL     Basic Metabolic Panel [[id number]]  (Abnormal) Collected:  05/17/19 0335    Specimen:  Blood Updated:  05/17/19 0518     Glucose 130 mg/dL      BUN 38 mg/dL      Creatinine 1.70 mg/dL      Sodium 140 mmol/L      Potassium 4.4 mmol/L      Chloride 104 mmol/L      CO2 27.5 mmol/L      Calcium 8.5 mg/dL      eGFR Non African Amer 40 mL/min/1.73      BUN/Creatinine Ratio 22.4     Anion Gap 8.5 mmol/L     Narrative:       GFR Normal >60  Chronic Kidney Disease <60  Kidney Failure <15    CBC & Differential [[id number]] Collected:  05/17/19 0335    Specimen:  Blood Updated:  05/17/19 0509    Narrative:       The following orders were created for panel order CBC & Differential.  Procedure                               Abnormality         Status                     ---------                               -----------         ------                     CBC Auto Differential[[id number]]        Abnormal            Final result                 Please view results for these tests on the individual orders.    CBC Auto Differential [[id number]]  (Abnormal) Collected:  05/17/19 0335    Specimen:  Blood Updated:  05/17/19 0509     WBC 4.97 10*3/mm3      RBC 3.20 10*6/mm3      Hemoglobin 9.2 g/dL      Hematocrit 30.0 %      MCV 93.8 fL      MCH 28.8 pg      MCHC 30.7 g/dL      RDW 15.1 %      RDW-SD 51.9 fl      MPV 10.5 fL      Platelets 172 10*3/mm3      Neutrophil % 61.9 %      Lymphocyte % 22.1 %      Monocyte % 8.2 %      Eosinophil % 7.2 %      Basophil % 0.2 %      Immature Grans % 0.4 %      Neutrophils, Absolute 3.07 10*3/mm3      Lymphocytes, Absolute 1.10 10*3/mm3      Monocytes, Absolute 0.41 10*3/mm3      Eosinophils, Absolute 0.36 10*3/mm3      Basophils, Absolute 0.01 10*3/mm3      Immature Grans, Absolute 0.02 10*3/mm3      nRBC 0.0 /100 WBC     POC Glucose  Once [[id number]]  (Abnormal) Collected:  05/16/19 2104    Specimen:  Blood Updated:  05/16/19 2105     Glucose 215 mg/dL     POC Glucose Once [[id number]]  (Abnormal) Collected:  05/16/19 1534    Specimen:  Blood Updated:  05/16/19 1535     Glucose 146 mg/dL     POC Glucose Once [[id number]]  (Abnormal) Collected:  05/16/19 1112    Specimen:  Blood Updated:  05/16/19 1114     Glucose 195 mg/dL     POC Glucose Once [[id number]]  (Abnormal) Collected:  05/16/19 0601    Specimen:  Blood Updated:  05/16/19 0603     Glucose 168 mg/dL     Basic Metabolic Panel [[id number]]  (Abnormal) Collected:  05/16/19 0350    Specimen:  Blood Updated:  05/16/19 0448     Glucose 178 mg/dL      BUN 35 mg/dL      Creatinine 1.70 mg/dL      Sodium 140 mmol/L      Potassium 4.5 mmol/L      Chloride 104 mmol/L      CO2 23.6 mmol/L      Calcium 8.3 mg/dL      eGFR Non African Amer 40 mL/min/1.73      BUN/Creatinine Ratio 20.6     Anion Gap 12.4 mmol/L     Narrative:       GFR Normal >60  Chronic Kidney Disease <60  Kidney Failure <15    CBC (No Diff) [[id number]]  (Abnormal) Collected:  05/16/19 0350    Specimen:  Blood Updated:  05/16/19 0434     WBC 5.61 10*3/mm3      RBC 3.28 10*6/mm3      Hemoglobin 9.5 g/dL      Hematocrit 30.3 %      MCV 92.4 fL      MCH 29.0 pg      MCHC 31.4 g/dL      RDW 14.9 %      RDW-SD 50.2 fl      MPV 10.6 fL      Platelets 171 10*3/mm3     POC Glucose Once [[id number]]  (Abnormal) Collected:  05/15/19 2145    Specimen:  Blood Updated:  05/15/19 2147     Glucose 207 mg/dL     POC Glucose Once [[id number]]  (Abnormal) Collected:  05/15/19 1623    Specimen:  Blood Updated:  05/15/19 1626     Glucose 202 mg/dL     POC Glucose Once [[id number]]  (Abnormal) Collected:  05/15/19 1053    Specimen:  Blood Updated:  05/15/19 1054     Glucose 222 mg/dL     Basic Metabolic Panel [[id number]]  (Abnormal) Collected:  05/15/19 0659    Specimen:  Blood Updated:  05/15/19 0747     Glucose 156 mg/dL      BUN 33 mg/dL      Creatinine  1.44 mg/dL      Sodium 136 mmol/L      Potassium 4.4 mmol/L      Chloride 101 mmol/L      CO2 28.8 mmol/L      Calcium 8.3 mg/dL      eGFR Non African Amer 48 mL/min/1.73      BUN/Creatinine Ratio 22.9     Anion Gap 6.2 mmol/L     Narrative:       GFR Normal >60  Chronic Kidney Disease <60  Kidney Failure <15    POC Glucose Once [[id number]]  (Abnormal) Collected:  05/15/19 0616    Specimen:  Blood Updated:  05/15/19 0618     Glucose 147 mg/dL     CBC (No Diff) [[id number]]  (Abnormal) Collected:  05/15/19 0407    Specimen:  Blood Updated:  05/15/19 0557     WBC 6.08 10*3/mm3      RBC 3.31 10*6/mm3      Hemoglobin 9.6 g/dL      Hematocrit 31.7 %      MCV 95.8 fL      MCH 29.0 pg      MCHC 30.3 g/dL      RDW 15.0 %      RDW-SD 51.9 fl      MPV 10.3 fL      Platelets 172 10*3/mm3     POC Glucose Once [[id number]]  (Abnormal) Collected:  05/14/19 2142    Specimen:  Blood Updated:  05/14/19 2143     Glucose 180 mg/dL     POC Glucose Once [[id number]]  (Abnormal) Collected:  05/14/19 1738    Specimen:  Blood Updated:  05/14/19 1741     Glucose 136 mg/dL     POC Glucose Once [[id number]]  (Normal) Collected:  05/14/19 1314    Specimen:  Blood Updated:  05/14/19 1317     Glucose 128 mg/dL     POC Glucose Once [[id number]]  (Abnormal) Collected:  05/14/19 1056    Specimen:  Blood Updated:  05/14/19 1057     Glucose 132 mg/dL     POC Glucose Once [[id number]]  (Abnormal) Collected:  05/14/19 0730    Specimen:  Blood Updated:  05/14/19 0734     Glucose 162 mg/dL     Vitamin B12 [[id number]]  (Abnormal) Collected:  05/14/19 0451    Specimen:  Blood Updated:  05/14/19 0604     Vitamin B-12 >2,000 pg/mL     Folate [[id number]]  (Normal) Collected:  05/14/19 0451    Specimen:  Blood Updated:  05/14/19 0604     Folate 5.76 ng/mL     TSH [[id number]]  (Normal) Collected:  05/14/19 0451    Specimen:  Blood Updated:  05/14/19 0554     TSH 3.040 mIU/mL     Iron Profile [[id number]]  (Abnormal) Collected:  05/14/19 0451    Specimen:  Blood  Updated:  05/14/19 0550     Iron 58 mcg/dL      Iron Saturation 17 %      Transferrin 228 mg/dL      TIBC 340 mcg/dL     Basic Metabolic Panel [[id number]]  (Abnormal) Collected:  05/14/19 0451    Specimen:  Blood Updated:  05/14/19 0550     Glucose 135 mg/dL      BUN 33 mg/dL      Creatinine 1.29 mg/dL      Sodium 134 mmol/L      Potassium 4.2 mmol/L      Chloride 100 mmol/L      CO2 26.0 mmol/L      Calcium 8.8 mg/dL      eGFR Non African Amer 54 mL/min/1.73      BUN/Creatinine Ratio 25.6     Anion Gap 8.0 mmol/L     Narrative:       GFR Normal >60  Chronic Kidney Disease <60  Kidney Failure <15    CBC (No Diff) [[id number]]  (Abnormal) Collected:  05/14/19 0451    Specimen:  Blood Updated:  05/14/19 0526     WBC 5.31 10*3/mm3      RBC 3.91 10*6/mm3      Hemoglobin 11.0 g/dL      Hematocrit 34.7 %      MCV 88.7 fL      MCH 28.1 pg      MCHC 31.7 g/dL      RDW 14.6 %      RDW-SD 46.8 fl      MPV 10.3 fL      Platelets 203 10*3/mm3     POC Glucose Once [[id number]]  (Abnormal) Collected:  05/13/19 2032    Specimen:  Blood Updated:  05/13/19 2035     Glucose 197 mg/dL     POC Glucose Once [[id number]]  (Abnormal) Collected:  05/13/19 1556    Specimen:  Blood Updated:  05/13/19 1557     Glucose 227 mg/dL     POC Glucose Once [[id number]]  (Abnormal) Collected:  05/13/19 1204    Specimen:  Blood Updated:  05/13/19 1205     Glucose 195 mg/dL     POC Glucose Once [[id number]]  (Abnormal) Collected:  05/13/19 0751    Specimen:  Blood Updated:  05/13/19 0752     Glucose 150 mg/dL     POC Glucose Once [[id number]]  (Abnormal) Collected:  05/12/19 2039    Specimen:  Blood Updated:  05/12/19 2040     Glucose 216 mg/dL     POC Glucose Once [[id number]]  (Abnormal) Collected:  05/12/19 1631    Specimen:  Blood Updated:  05/12/19 1632     Glucose 152 mg/dL     POC Glucose Once [[id number]]  (Abnormal) Collected:  05/12/19 1142    Specimen:  Blood Updated:  05/12/19 1143     Glucose 203 mg/dL     POC Glucose Once [[id number]]  (Abnormal)  "Collected:  05/12/19 0735    Specimen:  Blood Updated:  05/12/19 0736     Glucose 154 mg/dL     POC Glucose Once [[id number]]  (Abnormal) Collected:  05/11/19 2036    Specimen:  Blood Updated:  05/11/19 2038     Glucose 230 mg/dL     POC Glucose Once [[id number]]  (Abnormal) Collected:  05/11/19 1626    Specimen:  Blood Updated:  05/11/19 1627     Glucose 155 mg/dL     POC Glucose Once [[id number]]  (Abnormal) Collected:  05/11/19 1129    Specimen:  Blood Updated:  05/11/19 1130     Glucose 246 mg/dL         /73 (BP Location: Right arm, Patient Position: Lying)   Pulse 58   Temp 97 °F (36.1 °C) (Oral)   Resp 16   Ht 185.4 cm (73\")   Wt (!) 143 kg (316 lb)   SpO2 96%   BMI 41.69 kg/m²     Discharge Exam:  General Appearance:    Alert, cooperative, no distress                          Head:    Normocephalic, without obvious abnormality, atraumatic                          Eyes:                            Throat:   Lips, tongue, gums normal                          Neck:   Supple, symmetrical, trachea midline, no JVD                        Lungs:     Clear to auscultation bilaterally, respirations unlabored                Chest Wall:    No tenderness or deformity                        Heart:    Regular rate and rhythm, S1 and S2 normal, no murmur,no  Rub or gallop                  Abdomen:     Soft, non-tender, bowel sounds active, no masses, no organomegaly                  Extremities:   Extremities normal, left leg with surgical changes and in knee immobilizer, no cyanosis or edema                             Skin:   Skin is warm and dry,  no rashes or palpable lesions                  Neurologic:   no focal deficits noted     Disposition:  Skilled nursing facility    Patient Instructions:      Discharge Medications      New Medications      Instructions Start Date   acetaminophen 325 MG tablet  Commonly known as:  TYLENOL   650 mg, Oral, Every 4 Hours PRN      bisacodyl 10 MG suppository  Commonly known " as:  DULCOLAX   10 mg, Rectal, Daily PRN      HYDROcodone-acetaminophen 7.5-325 MG per tablet  Commonly known as:  NORCO   2 tablets, Oral, Every 4 Hours PRN      insulin lispro 100 UNIT/ML injection  Commonly known as:  humaLOG   0-7 Units, Subcutaneous, 4 Times Daily With Meals & Nightly      lactulose 10 GM/15ML solution  Commonly known as:  CHRONULAC   10 g, Oral, 2 Times Daily PRN      miconazole nitrate 2 % ointment ointment  Commonly known as:  ALOE VESTA   Topical, Every 12 Hours Scheduled      polyethylene glycol pack packet  Commonly known as:  MIRALAX   17 g, Oral, Daily   Start Date:  5/19/2019     sennosides-docusate sodium 8.6-50 MG tablet  Commonly known as:  SENOKOT-S   2 tablets, Oral, 2 Times Daily         Changes to Medications      Instructions Start Date   amLODIPine 10 MG tablet  Commonly known as:  NORVASC  What changed:    · medication strength  · how much to take   10 mg, Oral, Daily   Start Date:  5/19/2019     atenolol 25 MG tablet  Commonly known as:  TENORMIN  What changed:    · medication strength  · how much to take   25 mg, Oral, Daily   Start Date:  5/19/2019     glipiZIDE 5 MG tablet  Commonly known as:  GLUCOTROL  What changed:    · how much to take  · when to take this  · additional instructions   1 in am and 1 at 5 pm         Continue These Medications      Instructions Start Date   accu-chek soft touch lancets   ACCU-CHEK SOFTCLIX LANCETS      atorvastatin 40 MG tablet  Commonly known as:  LIPITOR   40 mg, Oral, Nightly      clopidogrel 75 MG tablet  Commonly known as:  PLAVIX   75 mg, Oral, Daily      fish oil 1000 MG capsule capsule   1,000 mg, Oral, Daily With Breakfast, PT HOLDING FOR SURGERY       glucose blood test strip  Commonly known as:  ACCU-CHEK BOB PLUS   Use as instructed      vitamin C 250 MG tablet  Commonly known as:  ASCORBIC ACID   250 mg, Oral, Daily      VITAMIN D3 PO   2,000 Units, Oral, Daily, PT HOLDING FOR SURGERY         Stop These Medications     furosemide 40 MG tablet  Commonly known as:  LASIX     metFORMIN  MG 24 hr tablet  Commonly known as:  GLUCOPHAGE-XR          Future Appointments   Date Time Provider Department Center   10/11/2019 10:45 AM Karen Red MD [facility] None   10/22/2019  8:30 AM Andrés Fuentes MD [facility] None   12/9/2019 11:15 AM Jo Toney MD [facility] None      Contact information for follow-up providers     Camilo Hunter MD Follow up in 2 week(s).    Specialty:  Orthopedic Surgery  Contact information:  [address]  [facility] [address]  [phone]             Karen Red MD Follow up.    Specialty:  Family Medicine  Why:  After released from rehab  Contact information:  [address]  [address]  [facility] [id number] [phone]                   Contact information for after-discharge care     Destination     [facility] Follow up.    Service:  Skilled Nursing  Contact information:  [address] [id number] [phone]                           Discharge Order (From admission, onward)    Start     Ordered    05/18/19 1046  Discharge patient  Once     Expected Discharge Date:  05/18/19    Discharge Disposition:  Skilled Nursing Facility (DC - External)    Physician of Record for Attribution - Please select from Treatment Team:  EARLINE EGAN [[id number]]    Review needed by CMO to determine Physician of Record:  No       Question Answer Comment   Physician of Record for Attribution - Please select from Treatment Team EARLINE EGAN    Review needed by CMO to determine Physician of Record No        05/18/19 1048          Total time spent discharging patient including evaluation,post hospitalization follow up,  medication and post hospitalization instructions and education total time exceeds 30 minutes.    Signed:  Earline Egan MD  5/18/2019  10:49 AM

## 2019-05-18 NOTE — THERAPY TREATMENT NOTE
Acute Care - Physical Therapy Treatment Note  [facility]     Patient Name: Rock Maciel Jr.  : 1945  MRN: [id number]  Today's Date: 2019  Onset of Illness/Injury or Date of Surgery: 19  Date of Referral to PT: 19  Referring Physician: Azra Fox APRN    Admit Date: 2019    Visit Dx:    ICD-10-CM ICD-9-CM   1. Fall, initial encounter W19.XXXA E888.9   2. Generalized weakness R53.1 780.79   3. Left leg pain M79.605 729.5   4. Immobility Z74.09 799.89   5. 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 (CMS/HCC)   • Chronic venous insufficiency   • Nonrheumatic aortic valve stenosis   • Carotid stenosis, asymptomatic   • Bradycardia   • Hyperlipidemia   • Bilateral carotid artery disease (CMS/HCC)   • Abnormal cardiovascular stress test   • Renal insufficiency   • H/O aortic valve replacement with porcine valve   • Charcot-Davida-Tooth disease-like deformity of foot   • Amputated toe of right foot (CMS/HCC)   • Fall   • Non-traumatic rhabdomyolysis   • S/P CABG x 2   • CKD (chronic kidney disease) stage 3, GFR 30-59 ml/min (CMS/HCC)   • Rupture of left quadriceps tendon   • Constipation       Therapy Treatment    Rehabilitation Treatment Summary     Row Name 19 1000             Treatment Time/Intention    Discipline  physical therapist  -RA      Document Type  therapy note (daily note)  -RA      Subjective  Information  complains of;weakness;fatigue;pain  -RA      Mode of Treatment  physical therapy  -RA      Patient/Family Observations  supine in bed, L LE bandaged and in knee brace (locked in extension)  -RA      Therapy Frequency (PT Clinical Impression)  daily  -RA      Patient Effort  good  -RA      Existing Precautions/Restrictions  fall knee brace locked in extension   -RA      Recorded by [RA] Elinor Diggs, PT 05/18/19 1051      Row Name 05/18/19 1000             Bed Mobility Assessment/Treatment    Bed Mobility Assessment/Treatment  supine-sit;sit-supine  -RA      Supine-Sit Lottie (Bed Mobility)  minimum assist (75% patient effort);moderate assist (50% patient effort)  -RA      Sit-Supine Lottie (Bed Mobility)  moderate assist (50% patient effort);2 person assist  -RA      Bed Mobility, Safety Issues  decreased use of legs for bridging/pushing  -RA      Assistive Device (Bed Mobility)  bed rails;head of bed elevated;overhead trapeze  -RA      Recorded by [RA] Elinor Diggs, PT 05/18/19 1051      Row Name 05/18/19 1000             Sit-Stand Transfer    Sit-Stand Lottie (Transfers)  moderate assist (50% patient effort);2 person assist;verbal cues  -RA      Assistive Device (Sit-Stand Transfers)  walker, front-wheeled;bariatric elevated bed   -RA      Recorded by [RA] Elinor Diggs, PT 05/18/19 1051      Row Name 05/18/19 1000             Stand-Sit Transfer    Stand-Sit Lottie (Transfers)  minimum assist (75% patient effort);moderate assist (50% patient effort);2 person assist  -RA      Assistive Device (Stand-Sit Transfers)  bariatric;walker, front-wheeled  -RA      Recorded by [RA] Elinor Diggs, PT 05/18/19 1051      Row Name 05/18/19 1000             Gait/Stairs Assessment/Training    Lottie Level (Gait)  moderate assist (50% patient effort);2 person assist;verbal cues  -RA      Assistive Device (Gait)  walker, front-wheeled;bariatric  -RA      Distance in Feet  (Gait)  3-4 sidesteps to HOB x 2 with seated rest in between.  Able to move L LE, scoots R LE without lifting off floor, reports feeling like he is going to fall backwards at times, fatigues quickly  -RA      Pattern (Gait)  step-to  -RA      Deviations/Abnormal Patterns (Gait)  antalgic;crispin decreased  -RA      Bilateral Gait Deviations  forward flexed posture  -RA      Comment (Gait/Stairs)  patient has B foot deformities affecting his standing balance  -RA      Recorded by [RA] Elinor Diggs, PT 05/18/19 1051      Row Name 05/18/19 1000             Therapeutic Exercise    Comment (Therapeutic Exercise)  B LE AP, R QS, LAQ.  Also instructed in glute sets, R LE hip abd and heel slides he can perform t/o the day in bed  -RA      Recorded by [RA] Elinor Diggs, PT 05/18/19 1055      Row Name 05/18/19 1000             Static Standing Balance    Level of Reynolds (Supported Standing, Static Balance)  minimal assist, 75% patient effort;2 person assist  -RA      Time Able to Maintain Position (Supported Standing, Static Balance)  1 to 2 minutes  -RA      Assistive Device Utilized (Supported Standing, Static Balance)  walker, rolling  -RA      Recorded by [RA] Elinor Diggs, PT 05/18/19 1055      Row Name 05/18/19 1000             Positioning and Restraints    Pre-Treatment Position  in bed  -RA      Post Treatment Position  bed  -RA      In Bed  supine;call light within reach;encouraged to call for assist;LLE elevated;with brace  -RA      Recorded by [RA] Elinor Diggs, PT 05/18/19 1051      Row Name 05/18/19 1000             Pain Scale: Numbers Pre/Post-Treatment    Pain Location - Side  Left  -RA      Pain Location  knee  -RA      Pain Intervention(s)  Repositioned  -RA      Recorded by [RA] Elinor Diggs, PT 05/18/19 1051      Row Name 05/18/19 1000             Pain Scale: Word Pre/Post-Treatment    Pain: Word Scale, Pretreatment  4 - moderate pain  -RA      Pain: Word Scale, Post-Treatment  4 -  moderate pain  -RA      Recorded by [RA] Elinor Diggs, PT 05/18/19 1051      Row Name                Residual Limb Assessment 05/06/19 0900 other (see comments)    Residual Limb Assessment - Properties Group Date first assessed: 05/06/19 [LL] Time first assessed: 0900 [LL] Amputation Date: -- [LL], unknown  Location: other (see comments) [LL], Left great toe, Right small toes (4 &5)  Recorded by:  [LL] Ling Aleman RN 05/07/19 1218    Row Name                Wound 05/12/19 0900 Left anterior;upper thigh blisters    Wound - Properties Group Date first assessed: 05/12/19 [AE] Time first assessed: 0900 [AE] Present On Admission : picture taken [AE] Side: Left [AE] Orientation: anterior;upper [AE] Location: thigh [AE] Type: blisters [AE] Recorded by:  [AE] Nina Holman RN 05/12/19 1241    Row Name                Wound 05/14/19 1615 Left leg incision    Wound - Properties Group Date first assessed: 05/14/19 [MM] Time first assessed: 1615 [MM] Side: Left [MM] Location: leg [MM] Type: incision [MM] Recorded by:  [MM] Lorri Conley RN 05/14/19 1615      User Key  (r) = Recorded By, (t) = Taken By, (c) = Cosigned By    Initials Name Effective Dates Discipline    RA Elinor Diggs, PT 04/06/17 -  PT    Nina Higgins RN 09/07/17 -  Nurse    MM Lorri Conley RN 06/16/16 -  Nurse    LL Ling Aleman RN 07/13/18 -  Nurse          Wound 05/12/19 0900 Left anterior;upper thigh blisters (Active)   Dressing Appearance dry;intact;no drainage 5/18/2019  8:15 AM   Closure NIKKI 5/18/2019  8:15 AM   Base dressing in place, unable to visualize 5/18/2019  8:15 AM       Wound 05/14/19 1615 Left leg incision (Active)   Dressing Appearance dry;intact;no drainage 5/18/2019  8:15 AM   Closure NIKKI 5/18/2019  8:15 AM   Base dressing in place, unable to visualize 5/18/2019  8:15 AM   Drainage Amount none 5/18/2019  4:18 AM   Dressing Care, Wound elastic bandage 5/17/2019  8:30 PM           Physical Therapy  Education     Title: PT OT SLP Therapies (In Progress)     Topic: Physical Therapy (Done)     Point: Mobility training (Done)     Learning Progress Summary           Patient Acceptance, E,TB, VU,NR by SUSAN at 5/18/2019 10:44 AM    Acceptance, E,TB, VU by FREDY at 5/17/2019  3:08 PM    Acceptance, E,D, DU by PC at 5/17/2019 11:03 AM    Acceptance, E,D, VU,NR by EDY at 5/16/2019  4:34 PM    Acceptance, E, VU by MS at 5/15/2019  1:22 PM    Acceptance, E,TB, VU,DU by CW at 5/13/2019  2:39 PM    Acceptance, E,TB, VU,DU by CW at 5/10/2019  3:28 PM    Acceptance, E,TB, VU,DU by CW at 5/9/2019  1:34 PM    Acceptance, E,TB, VU,DU by CW at 5/8/2019 10:50 AM    Acceptance, E,D, VU,DU by EH at 5/7/2019 11:36 AM    Acceptance, E, NR by CA at 5/6/2019 10:55 AM                   Point: Home exercise program (Done)     Learning Progress Summary           Patient Acceptance, E,TB, VU,NR by SUSAN at 5/18/2019 10:44 AM    Acceptance, E,TB, VU by FREDY at 5/17/2019  3:08 PM    Acceptance, E,D, DU by PC at 5/17/2019 11:03 AM    Acceptance, E,D, VU,NR by EDY at 5/16/2019  4:34 PM    Acceptance, E, VU by MS at 5/15/2019  1:22 PM    Acceptance, E,TB, VU,DU by CW at 5/13/2019  2:39 PM    Acceptance, E,TB, VU,DU by CW at 5/10/2019  3:28 PM    Acceptance, E,TB, VU,DU by CW at 5/9/2019  1:34 PM    Acceptance, E,TB, VU,DU by CW at 5/8/2019 10:50 AM    Acceptance, E,D, VU,DU by  at 5/7/2019 11:36 AM    Acceptance, E, NR by CA at 5/6/2019 10:55 AM                   Point: Body mechanics (Done)     Learning Progress Summary           Patient Acceptance, E,TB, VU,NR by RA at 5/18/2019 10:44 AM    Acceptance, E,TB, VU by KJ at 5/17/2019  3:08 PM    Acceptance, E,D, DU by PC at 5/17/2019 11:03 AM    Acceptance, E,D, VU,NR by EE at 5/16/2019  4:34 PM    Acceptance, E, VU by MS at 5/15/2019  1:22 PM    Acceptance, E,TB, VU,DU by CW at 5/13/2019  2:39 PM    Acceptance, E,TB, VU,DU by CW at 5/10/2019  3:28 PM    Acceptance, E,TB, VU,DU by CW at 5/9/2019  1:34 PM     Acceptance, E,TB, VU,DU by CW at 5/8/2019 10:50 AM    Acceptance, E,D, VU,DU by EH at 5/7/2019 11:36 AM    Acceptance, E, NR by CA at 5/6/2019 10:55 AM                   Point: Precautions (Done)     Learning Progress Summary           Patient Acceptance, E,TB, VU,NR by RA at 5/18/2019 10:44 AM    Acceptance, E,TB, VU by KJ at 5/17/2019  3:08 PM    Acceptance, E,D, DU by PC at 5/17/2019 11:03 AM    Acceptance, E,D, VU,NR by EE at 5/16/2019  4:34 PM    Acceptance, E, VU by MS at 5/15/2019  1:22 PM    Acceptance, E,TB, VU,DU by CW at 5/13/2019  2:39 PM    Acceptance, E,TB, VU,DU by CW at 5/10/2019  3:28 PM    Acceptance, E,TB, VU,DU by CW at 5/9/2019  1:34 PM    Acceptance, E,TB, VU,DU by CW at 5/8/2019 10:50 AM    Acceptance, E,D, VU,DU by  at 5/7/2019 11:36 AM    Acceptance, E, NR by CA at 5/6/2019 10:55 AM                               User Key     Initials Effective Dates Name Provider Type Discipline    RA 04/06/17 -  Elinor Diggs, PT Physical Therapist PT    PC 04/03/18 -  Marce Hall, PT Physical Therapist PT    EE 04/03/18 -  Madelin Gil, PT Physical Therapist PT    KJ 06/16/16 -  Jessica Anton, RN Registered Nurse Nurse    MS 03/04/19 -  Lorri Gallego, PT Physical Therapist PT    CW 03/07/18 -  Andi Sorensen, PTA Physical Therapy Assistant PT    EH 08/19/18 -  Henrietta Barker, GORDO Physical Therapy Assistant PT    CA 06/13/18 -  Ayanna Mar, PT Physical Therapist PT                PT Recommendation and Plan  Therapy Frequency (PT Clinical Impression): daily  Outcome Summary: Patient tires/fatigues quickly with activity.  He required mod A x 2 for STS and sidestepping toward HOB - performed 3-4 steps to left x 2 with seated rest in between.  Patient reports feeling like he is going to fall backward at times.Patient reports feeling like he is going to fall backward at times.Patient reports feeling like he is going to fall backward at times.  Not yet ready for bed to chair.   Encouraged R LE bed exercise t/o day.    Outcome Measures     Row Name 05/18/19 1000 05/17/19 1100 05/16/19 1600       How much help from another person do you currently need...    Turning from your back to your side while in flat bed without using bedrails?  3  -RA  3  -PC  3  -EE    Moving from lying on back to sitting on the side of a flat bed without bedrails?  2  -RA  3  -PC  3  -EE    Moving to and from a bed to a chair (including a wheelchair)?  2  -RA  2  -PC  2  -EE    Standing up from a chair using your arms (e.g., wheelchair, bedside chair)?  2  -RA  2  -PC  2  -EE    Climbing 3-5 steps with a railing?  1  -RA  1  -PC  1  -EE    To walk in hospital room?  2  -RA  2  -PC  1  -EE    AM-PAC 6 Clicks Score  12  -RA  13  -PC  12  -EE       Functional Assessment    Outcome Measure Options  --  --  AM-PAC 6 Clicks Basic Mobility (PT)  -EE    Row Name 05/15/19 1300             How much help from another person do you currently need...    Turning from your back to your side while in flat bed without using bedrails?  3  -MS      Moving from lying on back to sitting on the side of a flat bed without bedrails?  2  -MS      Moving to and from a bed to a chair (including a wheelchair)?  1  -MS      Standing up from a chair using your arms (e.g., wheelchair, bedside chair)?  2  -MS      Climbing 3-5 steps with a railing?  1  -MS      To walk in hospital room?  1  -MS      AM-PAC 6 Clicks Score  10  -MS         Functional Assessment    Outcome Measure Options  AM-PAC 6 Clicks Basic Mobility (PT)  -MS        User Key  (r) = Recorded By, (t) = Taken By, (c) = Cosigned By    Initials Name Provider Type    RA Elinor Diggs, PT Physical Therapist    PC Marce Hall, PT Physical Therapist    EE Madelin Gil, PT Physical Therapist    Lorri Olivarez, PT Physical Therapist         Time Calculation:   PT Charges     Row Name 05/18/19 1052             Time Calculation    Start Time  0942  -RA      Stop Time  1007  -RA       Time Calculation (min)  25 min  -RA      PT Received On  05/18/19  -SUSAN      PT - Next Appointment  05/19/19  -SUSAN        User Key  (r) = Recorded By, (t) = Taken By, (c) = Cosigned By    Initials Name Provider Type    Elinor Wall, PT Physical Therapist        Therapy Charges for Today     Code Description Service Date Service Provider Modifiers Qty    42898467866 HC PT THER PROC EA 15 MIN 5/18/2019 Elinor Diggs, PT GP 2    86366526852 HC PT THER SUPP EA 15 MIN 5/18/2019 Elinor Diggs, PT GP 2          PT G-Codes  Outcome Measure Options: AM-PAC 6 Clicks Basic Mobility (PT)  AM-PAC 6 Clicks Score: 12    Elinor Diggs PT  5/18/2019

## 2019-05-20 ENCOUNTER — TELEPHONE (OUTPATIENT)
Dept: ORTHOPEDIC SURGERY | Facility: CLINIC | Age: 74
End: 2019-05-20

## 2019-05-20 NOTE — TELEPHONE ENCOUNTER
Have spoken with the nurse at [facility].  Patient is to wear his leg brace at all times with it locked in full extension.  Patient may ambulate with PT weightbearing as tolerated as long as he has his brace on and the leg remains in full extension.  Patient also wears orthotics for deformities in both feet.  Dr. Hunter is unsure if the patient be able to wear his orthotic with the knee brace.  Patient must be able able to wear both the orthotic and the knee brace and able to weight-bear with his leg in absolute full extension in order to work with PT.  If he is unable to do any of the above than the patient will be nonweightbearing on that operative extremity per [facility]

## 2019-05-20 NOTE — PROGRESS NOTES
Case Management Discharge Note    Final Note: Discharged to [facility].     Destination - Selection Complete      Service Provider Request Status Selected Services Address Phone Number Fax Number    [facility] [address] [address] [phone]      Durable Medical Equipment      No service has been selected for the patient.      Dialysis/Infusion      No service has been selected for the patient.      Home Medical Care      No service has been selected for the patient.      Therapy      No service has been selected for the patient.      Community Resources      No service has been selected for the patient.             Final Discharge Disposition Code: 03 - skilled nursing facility (SNF)

## 2019-05-20 NOTE — TELEPHONE ENCOUNTER
[facility] nurse Monica called, asked to speak with AMB -  needing to clarify weight bearing status for Left leg & a schedule of when patient needs to be wearing Left Leg brace?    Monica notified to have patient arrive 5/29 at 9:30 at [facility] for 2 Week PO / Left QUADRICEPS TENDON REPAIR 5/14/19 (add-on note given to Hellen)

## 2019-05-23 ENCOUNTER — TELEPHONE (OUTPATIENT)
Dept: ORTHOPEDIC SURGERY | Facility: CLINIC | Age: 74
End: 2019-05-23

## 2019-05-23 NOTE — TELEPHONE ENCOUNTER
"Patient scheduled 5/29 for 2 WK PO / L QUAD TENDON REPAIR / SX 5-14     Bing - physical therapist with [facility] called needing PT  protocol for Quad repair faxed to Fax: [phone]     Bing stated if no written protocol, Does [facility] want PT \"to do Quad sets in the brace or anything else\"?  "

## 2019-05-29 ENCOUNTER — OFFICE VISIT (OUTPATIENT)
Dept: ORTHOPEDIC SURGERY | Facility: CLINIC | Age: 74
End: 2019-05-29

## 2019-05-29 VITALS — TEMPERATURE: 98.2 F | HEIGHT: 73 IN | WEIGHT: 315 LBS | BODY MASS INDEX: 41.75 KG/M2

## 2019-05-29 DIAGNOSIS — Z09 SURGERY FOLLOW-UP: Primary | ICD-10-CM

## 2019-05-29 PROCEDURE — 99024 POSTOP FOLLOW-UP VISIT: CPT | Performed by: ORTHOPAEDIC SURGERY

## 2019-05-30 NOTE — PROGRESS NOTES
"Mr. Maciel is now 2 weeks out from his left quad tendon repair.  No complaints or issues.    Vitals:    05/29/19 0937   Temp: 98.2 °F (36.8 °C)   TempSrc: Temporal   Weight: (!) 143 kg (315 lb)   Height: 185.4 cm (73\")     Incision is well approximated and benign.  No drainage.  No erythema.  Quad tendon is palpably intact.  He can straight leg raise.  Calf is soft.    Assessment: 2 weeks status post left quadriceps tendon repair    Plan: Continue brace to keep knee in extension.  Okay to weight-bear as tolerated in extension only.  I will see him back in 2 weeks to let him start working on progressive range of motion.  "

## 2019-06-12 ENCOUNTER — OFFICE VISIT (OUTPATIENT)
Dept: ORTHOPEDIC SURGERY | Facility: CLINIC | Age: 74
End: 2019-06-12

## 2019-06-12 VITALS — BODY MASS INDEX: 41.75 KG/M2 | HEIGHT: 73 IN | TEMPERATURE: 98.6 F | WEIGHT: 315 LBS

## 2019-06-12 DIAGNOSIS — Z09 SURGERY FOLLOW-UP: Primary | ICD-10-CM

## 2019-06-12 PROCEDURE — 99024 POSTOP FOLLOW-UP VISIT: CPT | Performed by: NURSE PRACTITIONER

## 2019-06-12 RX ORDER — FUROSEMIDE 20 MG/1
20 TABLET ORAL DAILY
COMMUNITY
End: 2019-10-11

## 2019-06-12 NOTE — PROGRESS NOTES
"Chief Complaint:  Follow up s/p Left quadriceps tendon repair    HPI: Patient returns to clinic today for follow-up.  Pain is well controlled.  Reports compliance with leg brace and physical therapy at [facility].  Reports occasional \"zapping \" sensation in his left foot.  He tells me he has neuropathy in both feet.     Exam: T-scope brace in place and subsequently removed for exam. Wound appears well-healed.  Some Steri-Strips are still in place.  Quadriceps tendon palpably intact.  Patient is able to perform a straight leg raise.         Imaging:  None    Assessment:  4 weeks s/p left quadriceps tendon repair    Plan: Patient may ambulate as tolerated using walker with left leg straight and brace locked at 0 degrees of flexion.  T-scope brace set at full extension to 30° of flexion.  May increase flexion by 10° per week.  For the neuropathic pain in his foot, I recommended he follow up with his PCP.  He will follow up in 4 weeks.       ZEINA Carver     06/12/2019    "

## 2019-06-13 ENCOUNTER — TELEPHONE (OUTPATIENT)
Dept: ORTHOPEDIC SURGERY | Facility: CLINIC | Age: 74
End: 2019-06-13

## 2019-06-13 NOTE — TELEPHONE ENCOUNTER
No straight leg raises or quad sets yet.  Okay to weight-bear as tolerated with the brace locked in full extension.  I prefer that he transfer with the brace locked as well.  Okay to begin active and passive range of motion, 0 to 30 degrees.  Progress 10 degrees/week.

## 2019-06-13 NOTE — TELEPHONE ENCOUNTER
Patient scheduled 7/10 for POST OP (SX 5/14/19)LT QUAD/HAMSTRING     Bing Shahid - PT with [facility] called to get clarification on new orders: Does [facility] want any active ROM 0-30 degrees?     Does leg brace need to be locked in full extension for transfers as well as ambulation?     Does [facility] want any straight leg raises or quad sets while in the brace? Thanks /srh

## 2019-06-20 ENCOUNTER — TELEPHONE (OUTPATIENT)
Dept: ORTHOPEDIC SURGERY | Facility: CLINIC | Age: 74
End: 2019-06-20

## 2019-06-20 NOTE — TELEPHONE ENCOUNTER
Tiff, please have Severiano contact them.    Maci, in the meantime, tell them to lock the brace straight until we can get the issue fixed.  Please explain that Severiano is out until next week but I will have him reach out to them on Monday.  Thanks

## 2019-07-10 ENCOUNTER — OFFICE VISIT (OUTPATIENT)
Dept: ORTHOPEDIC SURGERY | Facility: CLINIC | Age: 74
End: 2019-07-10

## 2019-07-10 VITALS — HEIGHT: 73 IN | BODY MASS INDEX: 41.56 KG/M2 | TEMPERATURE: 97.9 F

## 2019-07-10 DIAGNOSIS — Z09 SURGERY FOLLOW-UP: Primary | ICD-10-CM

## 2019-07-10 PROCEDURE — 99024 POSTOP FOLLOW-UP VISIT: CPT | Performed by: ORTHOPAEDIC SURGERY

## 2019-07-10 NOTE — PROGRESS NOTES
Chief Complaint:  Follow up s/p Left quadriceps tendon repair     HPI: Patient returns to clinic today for follow-up.  Pain is well controlled.    He has been compliant with use of the brace and the walking restrictions.  Denies any problems or issues.     Exam:  Incision is healed.  Quad tendon is palpably intact.  He can actively straight leg raise on his own without discomfort or difficulty.  Flexion is to approximately 90 degrees.     Imaging:  None     Assessment:  8 weeks s/p left quadriceps tendon repair     Plan:  I adjusted his brace accordingly.  I did still do not feel comfortable with him putting full weight on the leg just yet without the brace locked.  I am going to see him back in 1 month.  At that point, he will be 3 months out and it should be safe to let him come out of the brace.  So far so good but I still do not feel like we are out of the srviastava yet.     Camilo Hunter MD     07/10/2019

## 2019-07-15 ENCOUNTER — TELEPHONE (OUTPATIENT)
Dept: ORTHOPEDIC SURGERY | Facility: CLINIC | Age: 74
End: 2019-07-15

## 2019-07-30 ENCOUNTER — TELEPHONE (OUTPATIENT)
Dept: ORTHOPEDIC SURGERY | Facility: CLINIC | Age: 74
End: 2019-07-30

## 2019-07-30 NOTE — TELEPHONE ENCOUNTER
I cannot say for sure without seeing the brace itself.  Have him bring it to his next appointment.  He is also welcome to stop by and have Severiano inspect the brace to make sure that it is accomplishing the same goal.

## 2019-08-07 ENCOUNTER — OFFICE VISIT (OUTPATIENT)
Dept: ORTHOPEDIC SURGERY | Facility: CLINIC | Age: 74
End: 2019-08-07

## 2019-08-07 VITALS — BODY MASS INDEX: 41.63 KG/M2 | TEMPERATURE: 98.3 F | HEIGHT: 73 IN | WEIGHT: 314.1 LBS

## 2019-08-07 DIAGNOSIS — Z09 SURGERY FOLLOW-UP: Primary | ICD-10-CM

## 2019-08-07 PROCEDURE — 99024 POSTOP FOLLOW-UP VISIT: CPT | Performed by: ORTHOPAEDIC SURGERY

## 2019-08-07 NOTE — PROGRESS NOTES
Mr. Maciel is now 3 months out from his quad tendon repair.  He reports that that knee is doing great.  He recently switched braces and he likes his new brace much better.  He says he is mobilizing much better.  He is very happy with his progress.    Incision is healed.  Quad tendon is palpably intact.  He can actively straight leg raise without any difficulty or discomfort.  Motion is from full extension to 110 degrees of flexion, limited only by his body habitus.    Assessment: 3-month status post left quadriceps tendon repair    Plan: He seems to be doing well.  I still do not feel comfortable with him traversing stairs or inclines.  He also needs to avoid squats and lunges.  I prefer that he continue to ambulate with a walker.  I will see him back in another 6 weeks.  I anticipate releasing him at that time.

## 2019-09-09 ENCOUNTER — OFFICE VISIT (OUTPATIENT)
Dept: ORTHOPEDIC SURGERY | Facility: CLINIC | Age: 74
End: 2019-09-09

## 2019-09-09 VITALS — TEMPERATURE: 97.8 F | HEIGHT: 73 IN | WEIGHT: 314 LBS | BODY MASS INDEX: 41.62 KG/M2

## 2019-09-09 DIAGNOSIS — Z09 SURGERY FOLLOW-UP: Primary | ICD-10-CM

## 2019-09-09 PROCEDURE — 99212 OFFICE O/P EST SF 10 MIN: CPT | Performed by: ORTHOPAEDIC SURGERY

## 2019-09-09 NOTE — PROGRESS NOTES
Chief complaint: 4-month status post left quadriceps tendon repair    Mr. Maciel comes in for follow-up.  He says the knee seems to be doing much better.  He is walking with a walker.  He was walking with a walker even prior to his injury.  He still reports some subjective weakness in the leg but it seems to be improving.  Therapy is helping.    His incision is healed.  The quadriceps tendon is palpably intact.  He does have some quad atrophy relative to the right.  He can straight leg raise with good strength and no pain.  Motion is from full extension to about 110 degrees of flexion.  Gait is nonantalgic with a walker.    Assessment: 4 months status post left quadriceps tendon repair    Plan: He seems to be healed.  He still has significant atrophy.  He needs to work on progressive strengthening.  I recommend continued therapy.  I told him that his strength should continue to improve with time.  He can follow-up with me as needed.

## 2019-10-11 ENCOUNTER — OFFICE VISIT (OUTPATIENT)
Dept: FAMILY MEDICINE CLINIC | Facility: CLINIC | Age: 74
End: 2019-10-11

## 2019-10-11 VITALS
TEMPERATURE: 97.8 F | SYSTOLIC BLOOD PRESSURE: 152 MMHG | WEIGHT: 300.5 LBS | BODY MASS INDEX: 39.82 KG/M2 | OXYGEN SATURATION: 93 % | HEIGHT: 73 IN | DIASTOLIC BLOOD PRESSURE: 68 MMHG | HEART RATE: 66 BPM | RESPIRATION RATE: 13 BRPM

## 2019-10-11 DIAGNOSIS — E11.59 TYPE 2 DIABETES MELLITUS WITH OTHER CIRCULATORY COMPLICATION, WITHOUT LONG-TERM CURRENT USE OF INSULIN (HCC): Primary | ICD-10-CM

## 2019-10-11 DIAGNOSIS — I10 ESSENTIAL HYPERTENSION: ICD-10-CM

## 2019-10-11 DIAGNOSIS — E55.9 VITAMIN D DEFICIENCY: ICD-10-CM

## 2019-10-11 DIAGNOSIS — N18.30 CKD (CHRONIC KIDNEY DISEASE) STAGE 3, GFR 30-59 ML/MIN (HCC): ICD-10-CM

## 2019-10-11 LAB
BILIRUB BLD-MCNC: NEGATIVE MG/DL
CLARITY, POC: CLEAR
COLOR UR: YELLOW
GLUCOSE UR STRIP-MCNC: NEGATIVE MG/DL
KETONES UR QL: NEGATIVE
LEUKOCYTE EST, POC: NEGATIVE
NITRITE UR-MCNC: NEGATIVE MG/ML
PH UR: 5.5 [PH] (ref 5–8)
PROT UR STRIP-MCNC: ABNORMAL MG/DL
RBC # UR STRIP: NEGATIVE /UL
SP GR UR: 1.02 (ref 1–1.03)
UROBILINOGEN UR QL: NORMAL

## 2019-10-11 PROCEDURE — 99214 OFFICE O/P EST MOD 30 MIN: CPT | Performed by: FAMILY MEDICINE

## 2019-10-11 PROCEDURE — G0008 ADMIN INFLUENZA VIRUS VAC: HCPCS | Performed by: FAMILY MEDICINE

## 2019-10-11 PROCEDURE — G0009 ADMIN PNEUMOCOCCAL VACCINE: HCPCS | Performed by: FAMILY MEDICINE

## 2019-10-11 PROCEDURE — 90653 IIV ADJUVANT VACCINE IM: CPT | Performed by: FAMILY MEDICINE

## 2019-10-11 PROCEDURE — 81003 URINALYSIS AUTO W/O SCOPE: CPT | Performed by: FAMILY MEDICINE

## 2019-10-11 PROCEDURE — 90732 PPSV23 VACC 2 YRS+ SUBQ/IM: CPT | Performed by: FAMILY MEDICINE

## 2019-10-11 RX ORDER — FUROSEMIDE 40 MG/1
TABLET ORAL
COMMUNITY
Start: 2019-08-31 | End: 2020-03-19 | Stop reason: SDUPTHER
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
## 2019-10-12 LAB
25(OH)D3+25(OH)D2 SERPL-MCNC: 39.3 NG/ML (ref 30–100)
ALBUMIN SERPL-MCNC: 4.2 G/DL (ref 3.5–5.2)
BASOPHILS # BLD AUTO: 0.02 10*3/MM3 (ref 0–0.2)
BASOPHILS NFR BLD AUTO: 0.3 % (ref 0–1.5)
BUN SERPL-MCNC: 42 MG/DL (ref 8–23)
BUN/CREAT SERPL: 29 (ref 7–25)
CALCIUM SERPL-MCNC: 9.1 MG/DL (ref 8.6–10.5)
CHLORIDE SERPL-SCNC: 103 MMOL/L (ref 98–107)
CK SERPL-CCNC: 44 U/L (ref 20–200)
CO2 SERPL-SCNC: 26 MMOL/L (ref 22–29)
CREAT SERPL-MCNC: 1.45 MG/DL (ref 0.76–1.27)
CREAT UR-MCNC: 62.2 MG/DL
EOSINOPHIL # BLD AUTO: 0.13 10*3/MM3 (ref 0–0.4)
EOSINOPHIL NFR BLD AUTO: 2.2 % (ref 0.3–6.2)
ERYTHROCYTE [DISTWIDTH] IN BLOOD BY AUTOMATED COUNT: 13.1 % (ref 12.3–15.4)
GLUCOSE SERPL-MCNC: 203 MG/DL (ref 65–99)
HBA1C MFR BLD: 7.2 % (ref 4.8–5.6)
HCT VFR BLD AUTO: 40.2 % (ref 37.5–51)
HGB BLD-MCNC: 13.5 G/DL (ref 13–17.7)
IMM GRANULOCYTES # BLD AUTO: 0.01 10*3/MM3 (ref 0–0.05)
IMM GRANULOCYTES NFR BLD AUTO: 0.2 % (ref 0–0.5)
LYMPHOCYTES # BLD AUTO: 1.24 10*3/MM3 (ref 0.7–3.1)
LYMPHOCYTES NFR BLD AUTO: 21.3 % (ref 19.6–45.3)
MAGNESIUM SERPL-MCNC: 2.5 MG/DL (ref 1.6–2.4)
MCH RBC QN AUTO: 30.9 PG (ref 26.6–33)
MCHC RBC AUTO-ENTMCNC: 33.6 G/DL (ref 31.5–35.7)
MCV RBC AUTO: 92 FL (ref 79–97)
MONOCYTES # BLD AUTO: 0.39 10*3/MM3 (ref 0.1–0.9)
MONOCYTES NFR BLD AUTO: 6.7 % (ref 5–12)
NEUTROPHILS # BLD AUTO: 4.03 10*3/MM3 (ref 1.7–7)
NEUTROPHILS NFR BLD AUTO: 69.3 % (ref 42.7–76)
NRBC BLD AUTO-RTO: 0 /100 WBC (ref 0–0.2)
PHOSPHATE SERPL-MCNC: 3 MG/DL (ref 2.5–4.5)
PLATELET # BLD AUTO: 208 10*3/MM3 (ref 140–450)
POTASSIUM SERPL-SCNC: 4.8 MMOL/L (ref 3.5–5.2)
PROT UR-MCNC: 52 MG/DL
PROT/CREAT UR: 836 MG/G CREA (ref 0–200)
RBC # BLD AUTO: 4.37 10*6/MM3 (ref 4.14–5.8)
SODIUM SERPL-SCNC: 143 MMOL/L (ref 136–145)
TSH SERPL DL<=0.005 MIU/L-ACNC: 1.58 UIU/ML (ref 0.27–4.2)
URATE SERPL-MCNC: 8.7 MG/DL (ref 3.4–7)
WBC # BLD AUTO: 5.82 10*3/MM3 (ref 3.4–10.8)

## 2019-10-22 ENCOUNTER — OFFICE VISIT (OUTPATIENT)
Dept: SLEEP MEDICINE | Facility: HOSPITAL | Age: 74
End: 2019-10-22
Attending: PSYCHIATRY & NEUROLOGY

## 2019-10-22 VITALS
DIASTOLIC BLOOD PRESSURE: 62 MMHG | OXYGEN SATURATION: 95 % | HEIGHT: 73 IN | SYSTOLIC BLOOD PRESSURE: 162 MMHG | HEART RATE: 61 BPM | BODY MASS INDEX: 39.89 KG/M2 | WEIGHT: 301 LBS

## 2019-10-22 DIAGNOSIS — G47.33 OSA (OBSTRUCTIVE SLEEP APNEA): Primary | ICD-10-CM

## 2019-10-22 PROCEDURE — G0463 HOSPITAL OUTPT CLINIC VISIT: HCPCS

## 2019-10-22 NOTE — PROGRESS NOTES
" Rock Maciel Jr.  1945  74 y.o.     DATE OF SERVICE:  10/22/2019       HISTORY OF PRESENT ILLNESS: The patient has history of severe obstructive sleep apnea syndrome, hypertension, diabetes mellitus, and had aortic valve replacement with open heart surgery on July 23, 2018 and had surgery on his left quadriceps tendon in May 2019.    The patient has severe obstructive sleep apnea syndrome with apnea-hypopnea index AHI of 60 per sleep hour, minimum SpO2 of 66% with severe hypoxemic desaturations and percent of sleep time.     I have reviewed compliance data.  His compliance data indicate excellent compliance with 100% usage for more than 4 hours with an average usage of 8 hours and 24 minutes with the median CPAP pressure of 10.4 cms of water and residual AHI 1.4. He is compliant and benefiting from it.     Sleep schedule 10 PM to 6 AM and gets about 7-8 hours of sleep.  Sleep latency 30-40 minutes.  [place] Sleepiness Scale score 0.    Review of systems significant for nasal congestion.    PMH, PSH, Medications, allergies, FH, SH are reviewed and updated in the chart.   PHYSICAL EXAMINATION:  Vitals:    10/22/19 0833   BP: 162/62   Pulse: 61   SpO2: 95%   Weight: (!) 137 kg (301 lb)   Height: 185.4 cm (73\")   Body mass index is 39.71 kg/m². neck collar size 21 inches.   HEENT: Normal.   NECK: Supple. No bruits.   CARDIAC: Normal.   LUNGS: Clear to auscultation.   EXTREMITIES: No edema.     IMPRESSION: Patient with severe obstructive sleep apnea syndrome successfully treated with auto CPAP therapy and is compliant and benefiting from it.     1. SHASTA (obstructive sleep apnea)      RECOMMENDATIONS: We will continue auto CPAP therapy.  We'll request for repeating a replacing his auto CPAP machine.   Followup in 1 year.     Andrés Fuentes M.D.  10/22/2019   "

## 2020-03-05 ENCOUNTER — TRANSCRIBE ORDERS (OUTPATIENT)
Dept: ADMINISTRATIVE | Facility: HOSPITAL | Age: 75
End: 2020-03-05

## 2020-03-05 ENCOUNTER — LAB (OUTPATIENT)
Dept: LAB | Facility: HOSPITAL | Age: 75
End: 2020-03-05

## 2020-03-05 DIAGNOSIS — I10 ESSENTIAL HYPERTENSION, MALIGNANT: ICD-10-CM

## 2020-03-05 DIAGNOSIS — E11.8 DIABETIC COMPLICATION (HCC): ICD-10-CM

## 2020-03-05 DIAGNOSIS — N18.30 CHRONIC KIDNEY DISEASE, STAGE III (MODERATE) (HCC): Primary | ICD-10-CM

## 2020-03-05 DIAGNOSIS — N18.30 CHRONIC KIDNEY DISEASE, STAGE III (MODERATE) (HCC): ICD-10-CM

## 2020-03-05 LAB
25(OH)D3 SERPL-MCNC: 34.1 NG/ML (ref 30–100)
ALBUMIN SERPL-MCNC: 4.1 G/DL (ref 3.5–5.2)
ALBUMIN/GLOB SERPL: 1.2 G/DL
ALP SERPL-CCNC: 130 U/L (ref 39–117)
ALT SERPL W P-5'-P-CCNC: 8 U/L (ref 1–41)
ANION GAP SERPL CALCULATED.3IONS-SCNC: 11.1 MMOL/L (ref 5–15)
AST SERPL-CCNC: 12 U/L (ref 1–40)
BACTERIA UR QL AUTO: ABNORMAL /HPF
BILIRUB SERPL-MCNC: 0.5 MG/DL (ref 0.1–1.2)
BILIRUB UR QL STRIP: NEGATIVE
BUN BLD-MCNC: 43 MG/DL (ref 8–23)
BUN/CREAT SERPL: 29.5 (ref 7–25)
CALCIUM SPEC-SCNC: 9.5 MG/DL (ref 8.6–10.5)
CHLORIDE SERPL-SCNC: 100 MMOL/L (ref 98–107)
CK SERPL-CCNC: 61 U/L (ref 20–200)
CLARITY UR: CLEAR
CO2 SERPL-SCNC: 29.9 MMOL/L (ref 22–29)
COLOR UR: YELLOW
CREAT BLD-MCNC: 1.46 MG/DL (ref 0.76–1.27)
CREAT UR-MCNC: 31 MG/DL
DEPRECATED RDW RBC AUTO: 44.5 FL (ref 37–54)
ERYTHROCYTE [DISTWIDTH] IN BLOOD BY AUTOMATED COUNT: 13.1 % (ref 12.3–15.4)
GFR SERPL CREATININE-BSD FRML MDRD: 47 ML/MIN/1.73
GLOBULIN UR ELPH-MCNC: 3.3 GM/DL
GLUCOSE BLD-MCNC: 218 MG/DL (ref 65–99)
GLUCOSE UR STRIP-MCNC: NEGATIVE MG/DL
HBA1C MFR BLD: 7.1 % (ref 4.8–5.6)
HCT VFR BLD AUTO: 41.8 % (ref 37.5–51)
HGB BLD-MCNC: 13.8 G/DL (ref 13–17.7)
HGB UR QL STRIP.AUTO: NEGATIVE
HYALINE CASTS UR QL AUTO: ABNORMAL /LPF
KETONES UR QL STRIP: NEGATIVE
LEUKOCYTE ESTERASE UR QL STRIP.AUTO: NEGATIVE
MAGNESIUM SERPL-MCNC: 2.5 MG/DL (ref 1.6–2.4)
MCH RBC QN AUTO: 30.5 PG (ref 26.6–33)
MCHC RBC AUTO-ENTMCNC: 33 G/DL (ref 31.5–35.7)
MCV RBC AUTO: 92.5 FL (ref 79–97)
NITRITE UR QL STRIP: NEGATIVE
PH UR STRIP.AUTO: <=5 [PH] (ref 5–8)
PHOSPHATE SERPL-MCNC: 4.1 MG/DL (ref 2.5–4.5)
PLATELET # BLD AUTO: 174 10*3/MM3 (ref 140–450)
PMV BLD AUTO: 9.6 FL (ref 6–12)
POTASSIUM BLD-SCNC: 4.8 MMOL/L (ref 3.5–5.2)
PROT SERPL-MCNC: 7.4 G/DL (ref 6–8.5)
PROT UR QL STRIP: ABNORMAL
PROT UR-MCNC: 32 MG/DL
PTH-INTACT SERPL-MCNC: 69 PG/ML (ref 15–65)
RBC # BLD AUTO: 4.52 10*6/MM3 (ref 4.14–5.8)
RBC # UR: ABNORMAL /HPF
REF LAB TEST METHOD: ABNORMAL
SODIUM BLD-SCNC: 141 MMOL/L (ref 136–145)
SP GR UR STRIP: 1.01 (ref 1–1.03)
SQUAMOUS #/AREA URNS HPF: ABNORMAL /HPF
URATE SERPL-MCNC: 8.5 MG/DL (ref 3.4–7)
UROBILINOGEN UR QL STRIP: ABNORMAL
WBC NRBC COR # BLD: 5.66 10*3/MM3 (ref 3.4–10.8)
WBC UR QL AUTO: ABNORMAL /HPF

## 2020-03-05 PROCEDURE — 83036 HEMOGLOBIN GLYCOSYLATED A1C: CPT | Performed by: INTERNAL MEDICINE

## 2020-03-05 PROCEDURE — 84100 ASSAY OF PHOSPHORUS: CPT

## 2020-03-05 PROCEDURE — 83735 ASSAY OF MAGNESIUM: CPT

## 2020-03-05 PROCEDURE — 84156 ASSAY OF PROTEIN URINE: CPT | Performed by: INTERNAL MEDICINE

## 2020-03-05 PROCEDURE — 84550 ASSAY OF BLOOD/URIC ACID: CPT

## 2020-03-05 PROCEDURE — 82570 ASSAY OF URINE CREATININE: CPT | Performed by: INTERNAL MEDICINE

## 2020-03-05 PROCEDURE — 36415 COLL VENOUS BLD VENIPUNCTURE: CPT

## 2020-03-05 PROCEDURE — 82306 VITAMIN D 25 HYDROXY: CPT | Performed by: INTERNAL MEDICINE

## 2020-03-05 PROCEDURE — 82550 ASSAY OF CK (CPK): CPT | Performed by: INTERNAL MEDICINE

## 2020-03-05 PROCEDURE — 85027 COMPLETE CBC AUTOMATED: CPT

## 2020-03-05 PROCEDURE — 80053 COMPREHEN METABOLIC PANEL: CPT

## 2020-03-05 PROCEDURE — 81001 URINALYSIS AUTO W/SCOPE: CPT | Performed by: INTERNAL MEDICINE

## 2020-03-05 PROCEDURE — 83970 ASSAY OF PARATHORMONE: CPT | Performed by: INTERNAL MEDICINE

## 2020-03-15 ENCOUNTER — HOSPITAL ENCOUNTER (EMERGENCY)
Facility: HOSPITAL | Age: 75
Discharge: HOME | End: 2020-03-15
Attending: EMERGENCY MEDICINE | Admitting: EMERGENCY MEDICINE

## 2020-03-15 ENCOUNTER — APPOINTMENT (OUTPATIENT)
Dept: CT IMAGING | Facility: HOSPITAL | Age: 75
End: 2020-03-15

## 2020-03-15 ENCOUNTER — APPOINTMENT (OUTPATIENT)
Dept: GENERAL RADIOLOGY | Facility: HOSPITAL | Age: 75
End: 2020-03-15

## 2020-03-15 VITALS
WEIGHT: 300 LBS | DIASTOLIC BLOOD PRESSURE: 67 MMHG | SYSTOLIC BLOOD PRESSURE: 147 MMHG | OXYGEN SATURATION: 96 % | TEMPERATURE: 98.3 F | HEART RATE: 58 BPM | HEIGHT: 73 IN | BODY MASS INDEX: 39.76 KG/M2 | RESPIRATION RATE: 20 BRPM

## 2020-03-15 DIAGNOSIS — S80.811A ABRASION OF RIGHT LOWER EXTREMITY, INITIAL ENCOUNTER: ICD-10-CM
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
## 2020-03-15 DIAGNOSIS — S80.11XA CONTUSION OF MULTIPLE SITES OF RIGHT LEG, INITIAL ENCOUNTER: Primary | ICD-10-CM

## 2020-03-15 PROCEDURE — 72125 CT NECK SPINE W/O DYE: CPT

## 2020-03-15 PROCEDURE — 73590 X-RAY EXAM OF LOWER LEG: CPT

## 2020-03-15 PROCEDURE — 99284 EMERGENCY DEPT VISIT MOD MDM: CPT

## 2020-03-15 RX ORDER — HYDROCODONE BITARTRATE AND ACETAMINOPHEN 5; 325 MG/1; MG/1
1 TABLET ORAL ONCE
Status: COMPLETED | OUTPATIENT
Start: 2020-03-15 | End: 2020-03-15

## 2020-03-15 RX ORDER — TRAMADOL HYDROCHLORIDE 50 MG/1
50 TABLET ORAL EVERY 6 HOURS PRN
Qty: 15 TABLET | Refills: 0 | Status: ON HOLD | OUTPATIENT
Start: 2020-03-15 | End: 2020-04-03 | Stop reason: SDUPTHER

## 2020-03-15 RX ADMIN — HYDROCODONE BITARTRATE AND ACETAMINOPHEN 1 TABLET: 5; 325 TABLET ORAL at 14:13
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
## 2020-03-15 NOTE — DISCHARGE INSTRUCTIONS
Ice and elevate your right leg when at rest.  Use the medications as needed for pain.  Keep your abrasions clean and dry.  Apply an over-the-counter antibiotic ointment on your abrasions twice a day.  Please return to the emergency department symptoms worsen with redness, increasing pain to the right leg or if your right foot feels numb or cool to the touch.

## 2020-03-16 ENCOUNTER — PATIENT OUTREACH (OUTPATIENT)
Dept: CASE MANAGEMENT | Facility: OTHER | Age: 75
End: 2020-03-16
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
## 2020-03-17 ENCOUNTER — TELEPHONE (OUTPATIENT)
Dept: FAMILY MEDICINE CLINIC | Facility: CLINIC | Age: 75
End: 2020-03-17

## 2020-03-17 ENCOUNTER — PATIENT OUTREACH (OUTPATIENT)
Dept: CASE MANAGEMENT | Facility: OTHER | Age: 75
End: 2020-03-17

## 2020-03-17 DIAGNOSIS — Z74.09 IMMOBILITY: ICD-10-CM

## 2020-03-17 DIAGNOSIS — Z99.3 WHEELCHAIR BOUND: ICD-10-CM

## 2020-03-17 DIAGNOSIS — S81.801A OPEN WOUND OF RIGHT LOWER EXTREMITY, INITIAL ENCOUNTER: Primary | ICD-10-CM

## 2020-03-17 RX ORDER — CLINDAMYCIN HYDROCHLORIDE 300 MG/1
300 CAPSULE ORAL 3 TIMES DAILY
Qty: 15 CAPSULE | Refills: 0 | Status: SHIPPED | OUTPATIENT
Start: 2020-03-17 | End: 2020-04-03 | Stop reason: HOSPADM

## 2020-03-17 NOTE — OUTREACH NOTE
Care Coordination Note    Call placed to Dr. Red's office to request Home Health evaluation. Spoke with Ana. Awaiting call back with Dr. Red's recommendations.     Veronique Pineda RN  Ambulatory     3/17/2020, 11:43

## 2020-03-17 NOTE — OUTREACH NOTE
Patient Outreach Note    Patient called ACM due to leg continuing to hurt and redness at wound site. Patient reports leg is still bruised as well with some oozing. Family friend assessed the wound and recommended Home Health follow patient for wound management. Recommended to patient to schedule a follow up with Dr. Red and to request Home Health during follow up visit. Patient states he has decreased mobility and will not be able to get out of the house to get to a follow up appointment.    Veronique Pineda RN  Ambulatory     3/17/2020, 11:33
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
## 2020-03-19 RX ORDER — ATENOLOL 25 MG/1
25 TABLET ORAL DAILY
Start: 2020-03-19 | End: 2020-04-03 | Stop reason: HOSPADM

## 2020-03-19 RX ORDER — ATORVASTATIN CALCIUM 40 MG/1
40 TABLET, FILM COATED ORAL NIGHTLY
Qty: 90 TABLET | Refills: 1 | Status: SHIPPED | OUTPATIENT
Start: 2020-03-19 | End: 2020-04-03 | Stop reason: HOSPADM

## 2020-03-19 RX ORDER — GLIPIZIDE 5 MG/1
5 TABLET ORAL
Qty: 180 TABLET | Refills: 1 | Status: SHIPPED | OUTPATIENT
Start: 2020-03-19 | End: 2020-05-22

## 2020-03-19 RX ORDER — AMLODIPINE BESYLATE 10 MG/1
10 TABLET ORAL DAILY
Start: 2020-03-19 | End: 2020-05-22

## 2020-03-19 NOTE — TELEPHONE ENCOUNTER
Patient called stating that he needs some prescriptions refilled and new scripts would need to be sent to [facility]. Patient is needing refills on:    -glipiZIDE 5 MG    -Furosemide 40 MG    -Atorvastatin 40 MG    -amLODIPine 10 MG    -Atenolol 25 MG    -Allopurinol 300 MG (not listed on med list).    Patient needs a 3 month supply for all of these medications.     [facility] confirmed.     Patient call back: [phone].

## 2020-03-20 RX ORDER — FUROSEMIDE 40 MG/1
40 TABLET ORAL DAILY
Qty: 90 TABLET | Refills: 1 | Status: SHIPPED | OUTPATIENT
Start: 2020-03-20 | End: 2020-04-03 | Stop reason: HOSPADM

## 2020-03-23 ENCOUNTER — TELEPHONE (OUTPATIENT)
Dept: FAMILY MEDICINE CLINIC | Facility: CLINIC | Age: 75
End: 2020-03-23

## 2020-03-23 NOTE — TELEPHONE ENCOUNTER
PATIENT HOME HEALTH WOUND NURSE CALLED STATING VERBAL ORDERS FOR WOUND CARE. ALSO TO LET THE DOCTOR KNOW THAT HE WILL BE SEEING  IN TWO WEEKS.      PATIENT CAN BE REACHED AT: SUSANNE) [phone]

## 2020-03-25 ENCOUNTER — TELEPHONE (OUTPATIENT)
Dept: FAMILY MEDICINE CLINIC | Facility: CLINIC | Age: 75
End: 2020-03-25
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
## 2020-03-26 ENCOUNTER — PATIENT OUTREACH (OUTPATIENT)
Dept: CASE MANAGEMENT | Facility: OTHER | Age: 75
End: 2020-03-26

## 2020-03-26 ENCOUNTER — HOSPITAL ENCOUNTER (INPATIENT)
Facility: HOSPITAL | Age: 75
LOS: 8 days | Discharge: NURSING, REHAB OR LONG-TERM CARE | End: 2020-04-03
Attending: EMERGENCY MEDICINE | Admitting: SURGERY

## 2020-03-26 ENCOUNTER — APPOINTMENT (OUTPATIENT)
Dept: GENERAL RADIOLOGY | Facility: HOSPITAL | Age: 75
End: 2020-03-26

## 2020-03-26 ENCOUNTER — APPOINTMENT (OUTPATIENT)
Dept: CARDIOLOGY | Facility: HOSPITAL | Age: 75
End: 2020-03-26

## 2020-03-26 ENCOUNTER — TELEPHONE (OUTPATIENT)
Dept: FAMILY MEDICINE CLINIC | Facility: CLINIC | Age: 75
End: 2020-03-26

## 2020-03-26 DIAGNOSIS — T14.8XXA HEMATOMA: ICD-10-CM

## 2020-03-26 DIAGNOSIS — N17.9 ACUTE KIDNEY INJURY (HCC): Primary | ICD-10-CM

## 2020-03-26 DIAGNOSIS — D64.9 ANEMIA, UNSPECIFIED TYPE: ICD-10-CM

## 2020-03-26 DIAGNOSIS — S80.11XA CONTUSION OF MULTIPLE SITES OF RIGHT LEG, INITIAL ENCOUNTER: ICD-10-CM

## 2020-03-26 PROBLEM — S81.801A WOUND OF RIGHT LEG: Status: ACTIVE | Noted: 2020-03-26

## 2020-03-26 LAB
ALBUMIN SERPL-MCNC: 3.9 G/DL (ref 3.5–5.2)
ALBUMIN/GLOB SERPL: 1 G/DL
ALP SERPL-CCNC: 112 U/L (ref 39–117)
ALT SERPL W P-5'-P-CCNC: 9 U/L (ref 1–41)
ANION GAP SERPL CALCULATED.3IONS-SCNC: 11.3 MMOL/L (ref 5–15)
AST SERPL-CCNC: 14 U/L (ref 1–40)
BASOPHILS # BLD AUTO: 0.02 10*3/MM3 (ref 0–0.2)
BASOPHILS NFR BLD AUTO: 0.2 % (ref 0–1.5)
BH CV LOWER VASCULAR LEFT COMMON FEMORAL AUGMENT: NORMAL
BH CV LOWER VASCULAR LEFT COMMON FEMORAL COMPETENT: NORMAL
BH CV LOWER VASCULAR LEFT COMMON FEMORAL COMPRESS: NORMAL
BH CV LOWER VASCULAR LEFT COMMON FEMORAL PHASIC: NORMAL
BH CV LOWER VASCULAR LEFT COMMON FEMORAL SPONT: NORMAL
BH CV LOWER VASCULAR LEFT DISTAL FEMORAL COMPRESS: NORMAL
BH CV LOWER VASCULAR LEFT GASTRONEMIUS COMPRESS: NORMAL
BH CV LOWER VASCULAR LEFT GREATER SAPH AK COMPRESS: NORMAL
BH CV LOWER VASCULAR LEFT GREATER SAPH BK COMPRESS: NORMAL
BH CV LOWER VASCULAR LEFT MID FEMORAL AUGMENT: NORMAL
BH CV LOWER VASCULAR LEFT MID FEMORAL COMPETENT: NORMAL
BH CV LOWER VASCULAR LEFT MID FEMORAL COMPRESS: NORMAL
BH CV LOWER VASCULAR LEFT MID FEMORAL PHASIC: NORMAL
BH CV LOWER VASCULAR LEFT MID FEMORAL SPONT: NORMAL
BH CV LOWER VASCULAR LEFT PERONEAL COMPRESS: NORMAL
BH CV LOWER VASCULAR LEFT POPLITEAL AUGMENT: NORMAL
BH CV LOWER VASCULAR LEFT POPLITEAL COMPETENT: NORMAL
BH CV LOWER VASCULAR LEFT POPLITEAL COMPRESS: NORMAL
BH CV LOWER VASCULAR LEFT POPLITEAL PHASIC: NORMAL
BH CV LOWER VASCULAR LEFT POPLITEAL SPONT: NORMAL
BH CV LOWER VASCULAR LEFT POSTERIOR TIBIAL COMPRESS: NORMAL
BH CV LOWER VASCULAR LEFT PROFUNDA FEMORAL COMPRESS: NORMAL
BH CV LOWER VASCULAR LEFT PROXIMAL FEMORAL COMPRESS: NORMAL
BH CV LOWER VASCULAR LEFT SAPHENOFEMORAL JUNCTION COMPRESS: NORMAL
BH CV LOWER VASCULAR LEFT SOLEAL COMPRESS: NORMAL
BH CV LOWER VASCULAR RIGHT COMMON FEMORAL AUGMENT: NORMAL
BH CV LOWER VASCULAR RIGHT COMMON FEMORAL COMPETENT: NORMAL
BH CV LOWER VASCULAR RIGHT COMMON FEMORAL COMPRESS: NORMAL
BH CV LOWER VASCULAR RIGHT COMMON FEMORAL PHASIC: NORMAL
BH CV LOWER VASCULAR RIGHT COMMON FEMORAL SPONT: NORMAL
BH CV LOWER VASCULAR RIGHT DISTAL FEMORAL COMPRESS: NORMAL
BH CV LOWER VASCULAR RIGHT GASTRONEMIUS COMPRESS: NORMAL
BH CV LOWER VASCULAR RIGHT GREATER SAPH AK COMPRESS: NORMAL
BH CV LOWER VASCULAR RIGHT GREATER SAPH BK COMPRESS: NORMAL
BH CV LOWER VASCULAR RIGHT MID FEMORAL AUGMENT: NORMAL
BH CV LOWER VASCULAR RIGHT MID FEMORAL COMPETENT: NORMAL
BH CV LOWER VASCULAR RIGHT MID FEMORAL COMPRESS: NORMAL
BH CV LOWER VASCULAR RIGHT MID FEMORAL PHASIC: NORMAL
BH CV LOWER VASCULAR RIGHT MID FEMORAL SPONT: NORMAL
BH CV LOWER VASCULAR RIGHT PERONEAL COMPRESS: NORMAL
BH CV LOWER VASCULAR RIGHT POPLITEAL AUGMENT: NORMAL
BH CV LOWER VASCULAR RIGHT POPLITEAL COMPETENT: NORMAL
BH CV LOWER VASCULAR RIGHT POPLITEAL COMPRESS: NORMAL
BH CV LOWER VASCULAR RIGHT POPLITEAL PHASIC: NORMAL
BH CV LOWER VASCULAR RIGHT POPLITEAL SPONT: NORMAL
BH CV LOWER VASCULAR RIGHT POSTERIOR TIBIAL COMPRESS: NORMAL
BH CV LOWER VASCULAR RIGHT PROFUNDA FEMORAL COMPRESS: NORMAL
BH CV LOWER VASCULAR RIGHT PROXIMAL FEMORAL COMPRESS: NORMAL
BH CV LOWER VASCULAR RIGHT SAPHENOFEMORAL JUNCTION COMPRESS: NORMAL
BH CV LOWER VASCULAR RIGHT SOLEAL COMPRESS: NORMAL
BILIRUB SERPL-MCNC: 0.8 MG/DL (ref 0.2–1.2)
BUN BLD-MCNC: 63 MG/DL (ref 8–23)
BUN/CREAT SERPL: 33.2 (ref 7–25)
CALCIUM SPEC-SCNC: 9.4 MG/DL (ref 8.6–10.5)
CHLORIDE SERPL-SCNC: 92 MMOL/L (ref 98–107)
CO2 SERPL-SCNC: 29.7 MMOL/L (ref 22–29)
CREAT BLD-MCNC: 1.9 MG/DL (ref 0.76–1.27)
CRP SERPL-MCNC: 14.45 MG/DL (ref 0–0.5)
DEPRECATED RDW RBC AUTO: 44 FL (ref 37–54)
EOSINOPHIL # BLD AUTO: 0.07 10*3/MM3 (ref 0–0.4)
EOSINOPHIL NFR BLD AUTO: 0.8 % (ref 0.3–6.2)
ERYTHROCYTE [DISTWIDTH] IN BLOOD BY AUTOMATED COUNT: 13.1 % (ref 12.3–15.4)
ERYTHROCYTE [SEDIMENTATION RATE] IN BLOOD: 95 MM/HR (ref 0–20)
GFR SERPL CREATININE-BSD FRML MDRD: 35 ML/MIN/1.73
GLOBULIN UR ELPH-MCNC: 3.8 GM/DL
GLUCOSE BLD-MCNC: 228 MG/DL (ref 65–99)
GLUCOSE BLDC GLUCOMTR-MCNC: 262 MG/DL (ref 70–130)
HCT VFR BLD AUTO: 30.4 % (ref 37.5–51)
HGB BLD-MCNC: 10.1 G/DL (ref 13–17.7)
IMM GRANULOCYTES # BLD AUTO: 0.03 10*3/MM3 (ref 0–0.05)
IMM GRANULOCYTES NFR BLD AUTO: 0.4 % (ref 0–0.5)
LYMPHOCYTES # BLD AUTO: 1.08 10*3/MM3 (ref 0.7–3.1)
LYMPHOCYTES NFR BLD AUTO: 12.8 % (ref 19.6–45.3)
MCH RBC QN AUTO: 30.6 PG (ref 26.6–33)
MCHC RBC AUTO-ENTMCNC: 33.2 G/DL (ref 31.5–35.7)
MCV RBC AUTO: 92.1 FL (ref 79–97)
MONOCYTES # BLD AUTO: 0.54 10*3/MM3 (ref 0.1–0.9)
MONOCYTES NFR BLD AUTO: 6.4 % (ref 5–12)
NEUTROPHILS # BLD AUTO: 6.69 10*3/MM3 (ref 1.7–7)
NEUTROPHILS NFR BLD AUTO: 79.4 % (ref 42.7–76)
NRBC BLD AUTO-RTO: 0 /100 WBC (ref 0–0.2)
PLATELET # BLD AUTO: 238 10*3/MM3 (ref 140–450)
PMV BLD AUTO: 9.5 FL (ref 6–12)
POTASSIUM BLD-SCNC: 4.3 MMOL/L (ref 3.5–5.2)
PROT SERPL-MCNC: 7.7 G/DL (ref 6–8.5)
RBC # BLD AUTO: 3.3 10*6/MM3 (ref 4.14–5.8)
SODIUM BLD-SCNC: 133 MMOL/L (ref 136–145)
WBC NRBC COR # BLD: 8.43 10*3/MM3 (ref 3.4–10.8)

## 2020-03-26 PROCEDURE — 73590 X-RAY EXAM OF LOWER LEG: CPT

## 2020-03-26 PROCEDURE — 85025 COMPLETE CBC W/AUTO DIFF WBC: CPT | Performed by: PHYSICIAN ASSISTANT

## 2020-03-26 PROCEDURE — 99284 EMERGENCY DEPT VISIT MOD MDM: CPT

## 2020-03-26 PROCEDURE — G0378 HOSPITAL OBSERVATION PER HR: HCPCS

## 2020-03-26 PROCEDURE — 80053 COMPREHEN METABOLIC PANEL: CPT | Performed by: PHYSICIAN ASSISTANT

## 2020-03-26 PROCEDURE — 71046 X-RAY EXAM CHEST 2 VIEWS: CPT

## 2020-03-26 PROCEDURE — 82962 GLUCOSE BLOOD TEST: CPT

## 2020-03-26 PROCEDURE — 25010000002 VANCOMYCIN 10 G RECONSTITUTED SOLUTION: Performed by: PHYSICIAN ASSISTANT

## 2020-03-26 PROCEDURE — 87186 SC STD MICRODIL/AGAR DIL: CPT | Performed by: PHYSICIAN ASSISTANT

## 2020-03-26 PROCEDURE — 87070 CULTURE OTHR SPECIMN AEROBIC: CPT | Performed by: PHYSICIAN ASSISTANT

## 2020-03-26 PROCEDURE — 87205 SMEAR GRAM STAIN: CPT | Performed by: PHYSICIAN ASSISTANT

## 2020-03-26 PROCEDURE — 63710000001 INSULIN LISPRO (HUMAN) PER 5 UNITS: Performed by: SURGERY

## 2020-03-26 PROCEDURE — 86140 C-REACTIVE PROTEIN: CPT | Performed by: PHYSICIAN ASSISTANT

## 2020-03-26 PROCEDURE — 63710000001 INSULIN LISPRO (HUMAN) PER 5 UNITS: Performed by: NURSE PRACTITIONER

## 2020-03-26 PROCEDURE — 85652 RBC SED RATE AUTOMATED: CPT | Performed by: PHYSICIAN ASSISTANT

## 2020-03-26 PROCEDURE — 93970 EXTREMITY STUDY: CPT

## 2020-03-26 PROCEDURE — 87077 CULTURE AEROBIC IDENTIFY: CPT | Performed by: PHYSICIAN ASSISTANT

## 2020-03-26 PROCEDURE — 25010000002 PIPERACILLIN SOD-TAZOBACTAM PER 1 G: Performed by: PHYSICIAN ASSISTANT

## 2020-03-26 RX ORDER — NICOTINE POLACRILEX 4 MG
15 LOZENGE BUCCAL
Status: DISCONTINUED | OUTPATIENT
Start: 2020-03-26 | End: 2020-04-03 | Stop reason: HOSPADM

## 2020-03-26 RX ORDER — ACETAMINOPHEN 160 MG/5ML
650 SOLUTION ORAL EVERY 4 HOURS PRN
Status: DISCONTINUED | OUTPATIENT
Start: 2020-03-26 | End: 2020-04-03 | Stop reason: HOSPADM

## 2020-03-26 RX ORDER — ACETAMINOPHEN 325 MG/1
650 TABLET ORAL EVERY 4 HOURS PRN
Status: DISCONTINUED | OUTPATIENT
Start: 2020-03-26 | End: 2020-04-03 | Stop reason: HOSPADM

## 2020-03-26 RX ORDER — ONDANSETRON 2 MG/ML
4 INJECTION INTRAMUSCULAR; INTRAVENOUS EVERY 6 HOURS PRN
Status: DISCONTINUED | OUTPATIENT
Start: 2020-03-26 | End: 2020-04-03 | Stop reason: HOSPADM

## 2020-03-26 RX ORDER — SODIUM CHLORIDE 0.9 % (FLUSH) 0.9 %
10 SYRINGE (ML) INJECTION AS NEEDED
Status: DISCONTINUED | OUTPATIENT
Start: 2020-03-26 | End: 2020-04-03 | Stop reason: HOSPADM

## 2020-03-26 RX ORDER — SODIUM CHLORIDE 0.9 % (FLUSH) 0.9 %
10 SYRINGE (ML) INJECTION EVERY 12 HOURS SCHEDULED
Status: DISCONTINUED | OUTPATIENT
Start: 2020-03-26 | End: 2020-04-03 | Stop reason: HOSPADM

## 2020-03-26 RX ORDER — DEXTROSE MONOHYDRATE 25 G/50ML
25 INJECTION, SOLUTION INTRAVENOUS
Status: DISCONTINUED | OUTPATIENT
Start: 2020-03-26 | End: 2020-04-03 | Stop reason: HOSPADM

## 2020-03-26 RX ORDER — ACETAMINOPHEN 650 MG/1
650 SUPPOSITORY RECTAL EVERY 4 HOURS PRN
Status: DISCONTINUED | OUTPATIENT
Start: 2020-03-26 | End: 2020-04-03 | Stop reason: HOSPADM

## 2020-03-26 RX ADMIN — TAZOBACTAM SODIUM AND PIPERACILLIN SODIUM 3.38 G: 375; 3 INJECTION, SOLUTION INTRAVENOUS at 21:18

## 2020-03-26 RX ADMIN — SODIUM CHLORIDE 1000 ML: 9 INJECTION, SOLUTION INTRAVENOUS at 17:31

## 2020-03-26 RX ADMIN — VANCOMYCIN HYDROCHLORIDE 2750 MG: 10 INJECTION, POWDER, LYOPHILIZED, FOR SOLUTION INTRAVENOUS at 22:54

## 2020-03-26 RX ADMIN — SODIUM CHLORIDE, PRESERVATIVE FREE 10 ML: 5 INJECTION INTRAVENOUS at 22:54

## 2020-03-26 RX ADMIN — INSULIN LISPRO 4 UNITS: 100 INJECTION, SOLUTION INTRAVENOUS; SUBCUTANEOUS at 22:54
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
## 2020-03-27 ENCOUNTER — APPOINTMENT (OUTPATIENT)
Dept: ULTRASOUND IMAGING | Facility: HOSPITAL | Age: 75
End: 2020-03-27

## 2020-03-27 LAB
AMORPH URATE CRY URNS QL MICRO: NORMAL /HPF
ANION GAP SERPL CALCULATED.3IONS-SCNC: 12 MMOL/L (ref 5–15)
BACTERIA UR QL AUTO: NORMAL /HPF
BILIRUB UR QL STRIP: NEGATIVE
BUN BLD-MCNC: 48 MG/DL (ref 8–23)
BUN/CREAT SERPL: 30.4 (ref 7–25)
CALCIUM SPEC-SCNC: 8.4 MG/DL (ref 8.6–10.5)
CHLORIDE SERPL-SCNC: 101 MMOL/L (ref 98–107)
CK SERPL-CCNC: 310 U/L (ref 20–200)
CLARITY UR: CLEAR
CO2 SERPL-SCNC: 26 MMOL/L (ref 22–29)
COLOR UR: YELLOW
CREAT BLD-MCNC: 1.58 MG/DL (ref 0.76–1.27)
DEPRECATED RDW RBC AUTO: 41.9 FL (ref 37–54)
EOSINOPHIL SPEC QL MICRO: 0 % EOS/100 CELLS (ref 0–0)
ERYTHROCYTE [DISTWIDTH] IN BLOOD BY AUTOMATED COUNT: 12.9 % (ref 12.3–15.4)
GFR SERPL CREATININE-BSD FRML MDRD: 43 ML/MIN/1.73
GLUCOSE BLD-MCNC: 166 MG/DL (ref 65–99)
GLUCOSE BLDC GLUCOMTR-MCNC: 188 MG/DL (ref 70–130)
GLUCOSE BLDC GLUCOMTR-MCNC: 229 MG/DL (ref 70–130)
GLUCOSE BLDC GLUCOMTR-MCNC: 256 MG/DL (ref 70–130)
GLUCOSE BLDC GLUCOMTR-MCNC: 259 MG/DL (ref 70–130)
GLUCOSE UR STRIP-MCNC: NEGATIVE MG/DL
HBA1C MFR BLD: 7.1 % (ref 4.8–5.6)
HCT VFR BLD AUTO: 26.4 % (ref 37.5–51)
HGB BLD-MCNC: 9.1 G/DL (ref 13–17.7)
HGB UR QL STRIP.AUTO: NEGATIVE
HYALINE CASTS UR QL AUTO: NORMAL /LPF
IRON 24H UR-MRATE: 13 MCG/DL (ref 59–158)
KETONES UR QL STRIP: NEGATIVE
LEUKOCYTE ESTERASE UR QL STRIP.AUTO: NEGATIVE
MCH RBC QN AUTO: 31.3 PG (ref 26.6–33)
MCHC RBC AUTO-ENTMCNC: 34.5 G/DL (ref 31.5–35.7)
MCV RBC AUTO: 90.7 FL (ref 79–97)
NITRITE UR QL STRIP: NEGATIVE
PH UR STRIP.AUTO: <=5 [PH] (ref 5–8)
PLATELET # BLD AUTO: 217 10*3/MM3 (ref 140–450)
PMV BLD AUTO: 10 FL (ref 6–12)
POTASSIUM BLD-SCNC: 3.6 MMOL/L (ref 3.5–5.2)
PROT UR QL STRIP: ABNORMAL
PTH-INTACT SERPL-MCNC: 59 PG/ML (ref 15–65)
RBC # BLD AUTO: 2.91 10*6/MM3 (ref 4.14–5.8)
RBC # UR: NORMAL /HPF
REF LAB TEST METHOD: NORMAL
SODIUM BLD-SCNC: 139 MMOL/L (ref 136–145)
SODIUM UR-SCNC: 43 MMOL/L
SP GR UR STRIP: 1.02 (ref 1–1.03)
SQUAMOUS #/AREA URNS HPF: NORMAL /HPF
URATE SERPL-MCNC: 5.1 MG/DL (ref 3.4–7)
UROBILINOGEN UR QL STRIP: ABNORMAL
VANCOMYCIN SERPL-MCNC: 20.4 MCG/ML (ref 5–40)
WBC NRBC COR # BLD: 7.8 10*3/MM3 (ref 3.4–10.8)
WBC UR QL AUTO: NORMAL /HPF

## 2020-03-27 PROCEDURE — 83540 ASSAY OF IRON: CPT | Performed by: INTERNAL MEDICINE

## 2020-03-27 PROCEDURE — 25010000002 PIPERACILLIN SOD-TAZOBACTAM PER 1 G: Performed by: NURSE PRACTITIONER

## 2020-03-27 PROCEDURE — 81001 URINALYSIS AUTO W/SCOPE: CPT | Performed by: INTERNAL MEDICINE

## 2020-03-27 PROCEDURE — 85027 COMPLETE CBC AUTOMATED: CPT | Performed by: NURSE PRACTITIONER

## 2020-03-27 PROCEDURE — 80048 BASIC METABOLIC PNL TOTAL CA: CPT | Performed by: NURSE PRACTITIONER

## 2020-03-27 PROCEDURE — 84300 ASSAY OF URINE SODIUM: CPT | Performed by: INTERNAL MEDICINE

## 2020-03-27 PROCEDURE — 82962 GLUCOSE BLOOD TEST: CPT

## 2020-03-27 PROCEDURE — 80202 ASSAY OF VANCOMYCIN: CPT | Performed by: NURSE PRACTITIONER

## 2020-03-27 PROCEDURE — 84550 ASSAY OF BLOOD/URIC ACID: CPT | Performed by: INTERNAL MEDICINE

## 2020-03-27 PROCEDURE — 87205 SMEAR GRAM STAIN: CPT | Performed by: INTERNAL MEDICINE

## 2020-03-27 PROCEDURE — 25010000002 IRON SUCROSE PER 1 MG: Performed by: INTERNAL MEDICINE

## 2020-03-27 PROCEDURE — 25010000002 VANCOMYCIN 10 G RECONSTITUTED SOLUTION: Performed by: INTERNAL MEDICINE

## 2020-03-27 PROCEDURE — 76775 US EXAM ABDO BACK WALL LIM: CPT

## 2020-03-27 PROCEDURE — 36415 COLL VENOUS BLD VENIPUNCTURE: CPT | Performed by: NURSE PRACTITIONER

## 2020-03-27 PROCEDURE — 83970 ASSAY OF PARATHORMONE: CPT | Performed by: INTERNAL MEDICINE

## 2020-03-27 PROCEDURE — 63710000001 INSULIN LISPRO (HUMAN) PER 5 UNITS: Performed by: NURSE PRACTITIONER

## 2020-03-27 PROCEDURE — 83036 HEMOGLOBIN GLYCOSYLATED A1C: CPT | Performed by: NURSE PRACTITIONER

## 2020-03-27 PROCEDURE — 84165 PROTEIN E-PHORESIS SERUM: CPT | Performed by: INTERNAL MEDICINE

## 2020-03-27 PROCEDURE — 84155 ASSAY OF PROTEIN SERUM: CPT | Performed by: INTERNAL MEDICINE

## 2020-03-27 PROCEDURE — 25010000002 PIPERACILLIN SOD-TAZOBACTAM PER 1 G: Performed by: INTERNAL MEDICINE

## 2020-03-27 PROCEDURE — 82550 ASSAY OF CK (CPK): CPT | Performed by: INTERNAL MEDICINE

## 2020-03-27 RX ORDER — LACTULOSE 10 G/15ML
10 SOLUTION ORAL 2 TIMES DAILY PRN
Status: DISCONTINUED | OUTPATIENT
Start: 2020-03-27 | End: 2020-04-03 | Stop reason: HOSPADM

## 2020-03-27 RX ORDER — AMLODIPINE BESYLATE 10 MG/1
10 TABLET ORAL DAILY
Status: DISCONTINUED | OUTPATIENT
Start: 2020-03-27 | End: 2020-03-27

## 2020-03-27 RX ORDER — BUMETANIDE 1 MG/1
1 TABLET ORAL 2 TIMES DAILY
Status: DISCONTINUED | OUTPATIENT
Start: 2020-03-27 | End: 2020-03-30

## 2020-03-27 RX ORDER — BISACODYL 10 MG
10 SUPPOSITORY, RECTAL RECTAL DAILY PRN
Status: DISCONTINUED | OUTPATIENT
Start: 2020-03-27 | End: 2020-04-03 | Stop reason: HOSPADM

## 2020-03-27 RX ORDER — HYDRALAZINE HYDROCHLORIDE 25 MG/1
25 TABLET, FILM COATED ORAL EVERY 12 HOURS SCHEDULED
Status: DISCONTINUED | OUTPATIENT
Start: 2020-03-28 | End: 2020-04-01

## 2020-03-27 RX ORDER — FUROSEMIDE 40 MG/1
40 TABLET ORAL DAILY
Status: DISCONTINUED | OUTPATIENT
Start: 2020-03-27 | End: 2020-03-27

## 2020-03-27 RX ORDER — ATORVASTATIN CALCIUM 20 MG/1
40 TABLET, FILM COATED ORAL NIGHTLY
Status: DISCONTINUED | OUTPATIENT
Start: 2020-03-27 | End: 2020-03-27

## 2020-03-27 RX ORDER — HYDRALAZINE HYDROCHLORIDE 20 MG/ML
10 INJECTION INTRAMUSCULAR; INTRAVENOUS EVERY 6 HOURS PRN
Status: DISCONTINUED | OUTPATIENT
Start: 2020-03-27 | End: 2020-04-03 | Stop reason: HOSPADM

## 2020-03-27 RX ORDER — ATENOLOL 25 MG/1
25 TABLET ORAL DAILY
Status: DISCONTINUED | OUTPATIENT
Start: 2020-03-27 | End: 2020-03-27

## 2020-03-27 RX ORDER — CLOPIDOGREL BISULFATE 75 MG/1
75 TABLET ORAL DAILY
Status: DISCONTINUED | OUTPATIENT
Start: 2020-03-27 | End: 2020-04-03 | Stop reason: HOSPADM

## 2020-03-27 RX ADMIN — INSULIN LISPRO 4 UNITS: 100 INJECTION, SOLUTION INTRAVENOUS; SUBCUTANEOUS at 17:22

## 2020-03-27 RX ADMIN — CLOPIDOGREL 75 MG: 75 TABLET, FILM COATED ORAL at 13:55

## 2020-03-27 RX ADMIN — VANCOMYCIN HYDROCHLORIDE 1750 MG: 10 INJECTION, POWDER, LYOPHILIZED, FOR SOLUTION INTRAVENOUS at 18:29

## 2020-03-27 RX ADMIN — INSULIN LISPRO 3 UNITS: 100 INJECTION, SOLUTION INTRAVENOUS; SUBCUTANEOUS at 12:23

## 2020-03-27 RX ADMIN — ATENOLOL 25 MG: 25 TABLET ORAL at 13:55

## 2020-03-27 RX ADMIN — TAZOBACTAM SODIUM AND PIPERACILLIN SODIUM 4.5 G: 500; 4 INJECTION, SOLUTION INTRAVENOUS at 05:27

## 2020-03-27 RX ADMIN — INSULIN LISPRO 2 UNITS: 100 INJECTION, SOLUTION INTRAVENOUS; SUBCUTANEOUS at 08:17

## 2020-03-27 RX ADMIN — AMLODIPINE BESYLATE 10 MG: 10 TABLET ORAL at 13:56

## 2020-03-27 RX ADMIN — TAZOBACTAM SODIUM AND PIPERACILLIN SODIUM 4.5 G: 500; 4 INJECTION, SOLUTION INTRAVENOUS at 21:01

## 2020-03-27 RX ADMIN — TAZOBACTAM SODIUM AND PIPERACILLIN SODIUM 4.5 G: 500; 4 INJECTION, SOLUTION INTRAVENOUS at 13:22

## 2020-03-27 RX ADMIN — IRON SUCROSE 200 MG: 20 INJECTION, SOLUTION INTRAVENOUS at 17:30

## 2020-03-27 RX ADMIN — SODIUM CHLORIDE, PRESERVATIVE FREE 10 ML: 5 INJECTION INTRAVENOUS at 20:09

## 2020-03-27 RX ADMIN — BUMETANIDE 1 MG: 1 TABLET ORAL at 20:09

## 2020-03-27 RX ADMIN — INSULIN LISPRO 4 UNITS: 100 INJECTION, SOLUTION INTRAVENOUS; SUBCUTANEOUS at 21:01

## 2020-03-28 ENCOUNTER — ANESTHESIA (OUTPATIENT)
Dept: PERIOP | Facility: HOSPITAL | Age: 75
End: 2020-03-28

## 2020-03-28 ENCOUNTER — ANESTHESIA EVENT (OUTPATIENT)
Dept: PERIOP | Facility: HOSPITAL | Age: 75
End: 2020-03-28

## 2020-03-28 LAB
ALBUMIN SERPL-MCNC: 2.8 G/DL (ref 3.5–5.2)
ALBUMIN/GLOB SERPL: 0.8 G/DL
ALP SERPL-CCNC: 77 U/L (ref 39–117)
ALT SERPL W P-5'-P-CCNC: 10 U/L (ref 1–41)
ANION GAP SERPL CALCULATED.3IONS-SCNC: 10.6 MMOL/L (ref 5–15)
AST SERPL-CCNC: 17 U/L (ref 1–40)
BILIRUB SERPL-MCNC: 0.9 MG/DL (ref 0.2–1.2)
BUN BLD-MCNC: 37 MG/DL (ref 8–23)
BUN/CREAT SERPL: 23.7 (ref 7–25)
CA-I BLD-MCNC: 5.1 MG/DL (ref 4.6–5.4)
CA-I SERPL ISE-MCNC: 1.27 MMOL/L (ref 1.15–1.35)
CALCIUM SPEC-SCNC: 8.7 MG/DL (ref 8.6–10.5)
CHLORIDE SERPL-SCNC: 102 MMOL/L (ref 98–107)
CK SERPL-CCNC: 154 U/L (ref 20–200)
CO2 SERPL-SCNC: 27.4 MMOL/L (ref 22–29)
CREAT BLD-MCNC: 1.56 MG/DL (ref 0.76–1.27)
DEPRECATED RDW RBC AUTO: 42.8 FL (ref 37–54)
ERYTHROCYTE [DISTWIDTH] IN BLOOD BY AUTOMATED COUNT: 13 % (ref 12.3–15.4)
GFR SERPL CREATININE-BSD FRML MDRD: 44 ML/MIN/1.73
GIE STN SPEC: NORMAL
GLOBULIN UR ELPH-MCNC: 3.6 GM/DL
GLUCOSE BLD-MCNC: 164 MG/DL (ref 65–99)
GLUCOSE BLDC GLUCOMTR-MCNC: 181 MG/DL (ref 70–130)
GLUCOSE BLDC GLUCOMTR-MCNC: 204 MG/DL (ref 70–130)
GLUCOSE BLDC GLUCOMTR-MCNC: 210 MG/DL (ref 70–130)
GLUCOSE BLDC GLUCOMTR-MCNC: 241 MG/DL (ref 70–130)
GLUCOSE BLDC GLUCOMTR-MCNC: 282 MG/DL (ref 70–130)
HCT VFR BLD AUTO: 27.2 % (ref 37.5–51)
HGB BLD-MCNC: 9.2 G/DL (ref 13–17.7)
MAGNESIUM SERPL-MCNC: 2.6 MG/DL (ref 1.6–2.4)
MCH RBC QN AUTO: 30.7 PG (ref 26.6–33)
MCHC RBC AUTO-ENTMCNC: 33.8 G/DL (ref 31.5–35.7)
MCV RBC AUTO: 90.7 FL (ref 79–97)
PHOSPHATE SERPL-MCNC: 3.1 MG/DL (ref 2.5–4.5)
PLATELET # BLD AUTO: 206 10*3/MM3 (ref 140–450)
PMV BLD AUTO: 9.8 FL (ref 6–12)
POTASSIUM BLD-SCNC: 3.9 MMOL/L (ref 3.5–5.2)
PROT SERPL-MCNC: 6.4 G/DL (ref 6–8.5)
RBC # BLD AUTO: 3 10*6/MM3 (ref 4.14–5.8)
SODIUM BLD-SCNC: 140 MMOL/L (ref 136–145)
TSH SERPL DL<=0.05 MIU/L-ACNC: 2.04 UIU/ML (ref 0.27–4.2)
VANCOMYCIN TROUGH SERPL-MCNC: 26.9 MCG/ML (ref 5–20)
WBC NRBC COR # BLD: 8.1 10*3/MM3 (ref 3.4–10.8)

## 2020-03-28 PROCEDURE — 87070 CULTURE OTHR SPECIMN AEROBIC: CPT | Performed by: SURGERY

## 2020-03-28 PROCEDURE — 84443 ASSAY THYROID STIM HORMONE: CPT | Performed by: INTERNAL MEDICINE

## 2020-03-28 PROCEDURE — 87206 SMEAR FLUORESCENT/ACID STAI: CPT | Performed by: SURGERY

## 2020-03-28 PROCEDURE — 25010000002 VANCOMYCIN 1 G RECONSTITUTED SOLUTION 1 EACH VIAL: Performed by: SURGERY

## 2020-03-28 PROCEDURE — 80202 ASSAY OF VANCOMYCIN: CPT | Performed by: SURGERY

## 2020-03-28 PROCEDURE — 87077 CULTURE AEROBIC IDENTIFY: CPT | Performed by: SURGERY

## 2020-03-28 PROCEDURE — 0JBN0ZZ EXCISION OF RIGHT LOWER LEG SUBCUTANEOUS TISSUE AND FASCIA, OPEN APPROACH: ICD-10-PCS | Performed by: SURGERY

## 2020-03-28 PROCEDURE — 82330 ASSAY OF CALCIUM: CPT | Performed by: INTERNAL MEDICINE

## 2020-03-28 PROCEDURE — 25010000002 VANCOMYCIN 10 G RECONSTITUTED SOLUTION: Performed by: INTERNAL MEDICINE

## 2020-03-28 PROCEDURE — 82962 GLUCOSE BLOOD TEST: CPT

## 2020-03-28 PROCEDURE — 25010000002 PROPOFOL 10 MG/ML EMULSION: Performed by: NURSE ANESTHETIST, CERTIFIED REGISTERED

## 2020-03-28 PROCEDURE — 84100 ASSAY OF PHOSPHORUS: CPT | Performed by: INTERNAL MEDICINE

## 2020-03-28 PROCEDURE — 83735 ASSAY OF MAGNESIUM: CPT | Performed by: INTERNAL MEDICINE

## 2020-03-28 PROCEDURE — 25010000002 MIDAZOLAM PER 1 MG: Performed by: ANESTHESIOLOGY

## 2020-03-28 PROCEDURE — 87075 CULTR BACTERIA EXCEPT BLOOD: CPT | Performed by: SURGERY

## 2020-03-28 PROCEDURE — 87015 SPECIMEN INFECT AGNT CONCNTJ: CPT | Performed by: SURGERY

## 2020-03-28 PROCEDURE — 25010000002 PIPERACILLIN SOD-TAZOBACTAM PER 1 G: Performed by: INTERNAL MEDICINE

## 2020-03-28 PROCEDURE — 06LP0ZZ OCCLUSION OF RIGHT SAPHENOUS VEIN, OPEN APPROACH: ICD-10-PCS | Performed by: SURGERY

## 2020-03-28 PROCEDURE — 63710000001 INSULIN LISPRO (HUMAN) PER 5 UNITS: Performed by: SURGERY

## 2020-03-28 PROCEDURE — 82550 ASSAY OF CK (CPK): CPT | Performed by: INTERNAL MEDICINE

## 2020-03-28 PROCEDURE — 87186 SC STD MICRODIL/AGAR DIL: CPT | Performed by: SURGERY

## 2020-03-28 PROCEDURE — 25010000002 IRON SUCROSE PER 1 MG: Performed by: INTERNAL MEDICINE

## 2020-03-28 PROCEDURE — 25010000002 FENTANYL CITRATE (PF) 100 MCG/2ML SOLUTION: Performed by: NURSE ANESTHETIST, CERTIFIED REGISTERED

## 2020-03-28 PROCEDURE — 85027 COMPLETE CBC AUTOMATED: CPT | Performed by: INTERNAL MEDICINE

## 2020-03-28 PROCEDURE — 87205 SMEAR GRAM STAIN: CPT | Performed by: SURGERY

## 2020-03-28 PROCEDURE — 25010000002 PIPERACILLIN SOD-TAZOBACTAM PER 1 G: Performed by: SURGERY

## 2020-03-28 PROCEDURE — 0J9N0ZZ DRAINAGE OF RIGHT LOWER LEG SUBCUTANEOUS TISSUE AND FASCIA, OPEN APPROACH: ICD-10-PCS | Performed by: SURGERY

## 2020-03-28 PROCEDURE — 80053 COMPREHEN METABOLIC PANEL: CPT | Performed by: INTERNAL MEDICINE

## 2020-03-28 RX ORDER — HYDRALAZINE HYDROCHLORIDE 20 MG/ML
5 INJECTION INTRAMUSCULAR; INTRAVENOUS
Status: DISCONTINUED | OUTPATIENT
Start: 2020-03-28 | End: 2020-03-28 | Stop reason: HOSPADM

## 2020-03-28 RX ORDER — PROPOFOL 10 MG/ML
VIAL (ML) INTRAVENOUS AS NEEDED
Status: DISCONTINUED | OUTPATIENT
Start: 2020-03-28 | End: 2020-03-28 | Stop reason: SURG

## 2020-03-28 RX ORDER — TRAMADOL HYDROCHLORIDE 50 MG/1
50 TABLET ORAL EVERY 6 HOURS PRN
Status: DISCONTINUED | OUTPATIENT
Start: 2020-03-28 | End: 2020-04-03 | Stop reason: HOSPADM

## 2020-03-28 RX ORDER — DIPHENHYDRAMINE HYDROCHLORIDE 50 MG/ML
12.5 INJECTION INTRAMUSCULAR; INTRAVENOUS
Status: DISCONTINUED | OUTPATIENT
Start: 2020-03-28 | End: 2020-03-28 | Stop reason: HOSPADM

## 2020-03-28 RX ORDER — SODIUM CHLORIDE 0.9 % (FLUSH) 0.9 %
10 SYRINGE (ML) INJECTION AS NEEDED
Status: DISCONTINUED | OUTPATIENT
Start: 2020-03-28 | End: 2020-03-28 | Stop reason: HOSPADM

## 2020-03-28 RX ORDER — FLUMAZENIL 0.1 MG/ML
0.2 INJECTION INTRAVENOUS AS NEEDED
Status: DISCONTINUED | OUTPATIENT
Start: 2020-03-28 | End: 2020-03-28 | Stop reason: HOSPADM

## 2020-03-28 RX ORDER — FENTANYL CITRATE 50 UG/ML
50 INJECTION, SOLUTION INTRAMUSCULAR; INTRAVENOUS
Status: DISCONTINUED | OUTPATIENT
Start: 2020-03-28 | End: 2020-03-28 | Stop reason: HOSPADM

## 2020-03-28 RX ORDER — PROMETHAZINE HYDROCHLORIDE 25 MG/ML
12.5 INJECTION, SOLUTION INTRAMUSCULAR; INTRAVENOUS ONCE AS NEEDED
Status: DISCONTINUED | OUTPATIENT
Start: 2020-03-28 | End: 2020-03-28 | Stop reason: HOSPADM

## 2020-03-28 RX ORDER — ATORVASTATIN CALCIUM 20 MG/1
20 TABLET, FILM COATED ORAL NIGHTLY
Status: DISCONTINUED | OUTPATIENT
Start: 2020-03-28 | End: 2020-04-03 | Stop reason: HOSPADM

## 2020-03-28 RX ORDER — LIDOCAINE HYDROCHLORIDE 20 MG/ML
INJECTION, SOLUTION INFILTRATION; PERINEURAL AS NEEDED
Status: DISCONTINUED | OUTPATIENT
Start: 2020-03-28 | End: 2020-03-28 | Stop reason: SURG

## 2020-03-28 RX ORDER — SODIUM HYPOCHLORITE 1.25 MG/ML
SOLUTION TOPICAL 2 TIMES DAILY
Status: DISCONTINUED | OUTPATIENT
Start: 2020-03-29 | End: 2020-03-30

## 2020-03-28 RX ORDER — DIPHENHYDRAMINE HCL 25 MG
25 CAPSULE ORAL
Status: DISCONTINUED | OUTPATIENT
Start: 2020-03-28 | End: 2020-03-28 | Stop reason: HOSPADM

## 2020-03-28 RX ORDER — FAMOTIDINE 10 MG/ML
20 INJECTION, SOLUTION INTRAVENOUS ONCE
Status: COMPLETED | OUTPATIENT
Start: 2020-03-28 | End: 2020-03-28

## 2020-03-28 RX ORDER — SODIUM CHLORIDE, SODIUM LACTATE, POTASSIUM CHLORIDE, CALCIUM CHLORIDE 600; 310; 30; 20 MG/100ML; MG/100ML; MG/100ML; MG/100ML
9 INJECTION, SOLUTION INTRAVENOUS CONTINUOUS
Status: DISCONTINUED | OUTPATIENT
Start: 2020-03-28 | End: 2020-04-03

## 2020-03-28 RX ORDER — EPHEDRINE SULFATE 50 MG/ML
5 INJECTION, SOLUTION INTRAVENOUS ONCE AS NEEDED
Status: DISCONTINUED | OUTPATIENT
Start: 2020-03-28 | End: 2020-03-28 | Stop reason: HOSPADM

## 2020-03-28 RX ORDER — OXYCODONE AND ACETAMINOPHEN 7.5; 325 MG/1; MG/1
1 TABLET ORAL ONCE AS NEEDED
Status: DISCONTINUED | OUTPATIENT
Start: 2020-03-28 | End: 2020-03-28 | Stop reason: HOSPADM

## 2020-03-28 RX ORDER — MIDAZOLAM HYDROCHLORIDE 1 MG/ML
2 INJECTION INTRAMUSCULAR; INTRAVENOUS
Status: DISCONTINUED | OUTPATIENT
Start: 2020-03-28 | End: 2020-03-28 | Stop reason: HOSPADM

## 2020-03-28 RX ORDER — HYDROMORPHONE HYDROCHLORIDE 1 MG/ML
0.5 INJECTION, SOLUTION INTRAMUSCULAR; INTRAVENOUS; SUBCUTANEOUS
Status: DISCONTINUED | OUTPATIENT
Start: 2020-03-28 | End: 2020-03-28 | Stop reason: HOSPADM

## 2020-03-28 RX ORDER — HYDROMORPHONE HYDROCHLORIDE 1 MG/ML
0.5 INJECTION, SOLUTION INTRAMUSCULAR; INTRAVENOUS; SUBCUTANEOUS
Status: DISCONTINUED | OUTPATIENT
Start: 2020-03-28 | End: 2020-04-03 | Stop reason: HOSPADM

## 2020-03-28 RX ORDER — NALOXONE HCL 0.4 MG/ML
0.2 VIAL (ML) INJECTION AS NEEDED
Status: DISCONTINUED | OUTPATIENT
Start: 2020-03-28 | End: 2020-03-28 | Stop reason: HOSPADM

## 2020-03-28 RX ORDER — ACETAMINOPHEN 325 MG/1
650 TABLET ORAL ONCE AS NEEDED
Status: DISCONTINUED | OUTPATIENT
Start: 2020-03-28 | End: 2020-03-28 | Stop reason: HOSPADM

## 2020-03-28 RX ORDER — PROMETHAZINE HYDROCHLORIDE 25 MG/ML
6.25 INJECTION, SOLUTION INTRAMUSCULAR; INTRAVENOUS
Status: DISCONTINUED | OUTPATIENT
Start: 2020-03-28 | End: 2020-03-28 | Stop reason: HOSPADM

## 2020-03-28 RX ORDER — PROMETHAZINE HYDROCHLORIDE 25 MG/1
25 SUPPOSITORY RECTAL ONCE AS NEEDED
Status: DISCONTINUED | OUTPATIENT
Start: 2020-03-28 | End: 2020-03-28 | Stop reason: HOSPADM

## 2020-03-28 RX ORDER — MIDAZOLAM HYDROCHLORIDE 1 MG/ML
1 INJECTION INTRAMUSCULAR; INTRAVENOUS
Status: DISCONTINUED | OUTPATIENT
Start: 2020-03-28 | End: 2020-03-28 | Stop reason: HOSPADM

## 2020-03-28 RX ORDER — PROMETHAZINE HYDROCHLORIDE 25 MG/1
25 TABLET ORAL ONCE AS NEEDED
Status: DISCONTINUED | OUTPATIENT
Start: 2020-03-28 | End: 2020-03-28 | Stop reason: HOSPADM

## 2020-03-28 RX ORDER — SODIUM CHLORIDE 0.9 % (FLUSH) 0.9 %
10 SYRINGE (ML) INJECTION EVERY 12 HOURS SCHEDULED
Status: DISCONTINUED | OUTPATIENT
Start: 2020-03-28 | End: 2020-03-28 | Stop reason: HOSPADM

## 2020-03-28 RX ORDER — ONDANSETRON 2 MG/ML
4 INJECTION INTRAMUSCULAR; INTRAVENOUS ONCE AS NEEDED
Status: DISCONTINUED | OUTPATIENT
Start: 2020-03-28 | End: 2020-03-28 | Stop reason: HOSPADM

## 2020-03-28 RX ORDER — HYDROCODONE BITARTRATE AND ACETAMINOPHEN 7.5; 325 MG/1; MG/1
1 TABLET ORAL ONCE AS NEEDED
Status: DISCONTINUED | OUTPATIENT
Start: 2020-03-28 | End: 2020-03-28 | Stop reason: HOSPADM

## 2020-03-28 RX ADMIN — FENTANYL CITRATE 50 MCG: 50 INJECTION, SOLUTION INTRAMUSCULAR; INTRAVENOUS at 13:38

## 2020-03-28 RX ADMIN — ATORVASTATIN CALCIUM 20 MG: 20 TABLET, FILM COATED ORAL at 21:00

## 2020-03-28 RX ADMIN — METOPROLOL TARTRATE 25 MG: 25 TABLET ORAL at 08:06

## 2020-03-28 RX ADMIN — PROPOFOL 200 MG: 10 INJECTION, EMULSION INTRAVENOUS at 12:05

## 2020-03-28 RX ADMIN — INSULIN LISPRO 3 UNITS: 100 INJECTION, SOLUTION INTRAVENOUS; SUBCUTANEOUS at 17:55

## 2020-03-28 RX ADMIN — TRAMADOL HYDROCHLORIDE 50 MG: 50 TABLET, FILM COATED ORAL at 20:07

## 2020-03-28 RX ADMIN — TAZOBACTAM SODIUM AND PIPERACILLIN SODIUM 4.5 G: 500; 4 INJECTION, SOLUTION INTRAVENOUS at 06:05

## 2020-03-28 RX ADMIN — SODIUM CHLORIDE, POTASSIUM CHLORIDE, SODIUM LACTATE AND CALCIUM CHLORIDE 9 ML/HR: 600; 310; 30; 20 INJECTION, SOLUTION INTRAVENOUS at 11:22

## 2020-03-28 RX ADMIN — BUMETANIDE 1 MG: 1 TABLET ORAL at 20:07

## 2020-03-28 RX ADMIN — MIDAZOLAM 1 MG: 1 INJECTION INTRAMUSCULAR; INTRAVENOUS at 11:21

## 2020-03-28 RX ADMIN — LIDOCAINE HYDROCHLORIDE 100 MG: 20 INJECTION, SOLUTION INFILTRATION; PERINEURAL at 12:05

## 2020-03-28 RX ADMIN — TAZOBACTAM SODIUM AND PIPERACILLIN SODIUM 4.5 G: 500; 4 INJECTION, SOLUTION INTRAVENOUS at 23:09

## 2020-03-28 RX ADMIN — METOPROLOL TARTRATE 25 MG: 25 TABLET ORAL at 20:08

## 2020-03-28 RX ADMIN — SODIUM CHLORIDE, PRESERVATIVE FREE 10 ML: 5 INJECTION INTRAVENOUS at 08:06

## 2020-03-28 RX ADMIN — HYDRALAZINE HYDROCHLORIDE 25 MG: 25 TABLET, FILM COATED ORAL at 23:12

## 2020-03-28 RX ADMIN — INSULIN LISPRO 4 UNITS: 100 INJECTION, SOLUTION INTRAVENOUS; SUBCUTANEOUS at 21:00

## 2020-03-28 RX ADMIN — IRON SUCROSE 200 MG: 20 INJECTION, SOLUTION INTRAVENOUS at 14:38

## 2020-03-28 RX ADMIN — TAZOBACTAM SODIUM AND PIPERACILLIN SODIUM 4.5 G: 500; 4 INJECTION, SOLUTION INTRAVENOUS at 15:37

## 2020-03-28 RX ADMIN — SODIUM CHLORIDE, PRESERVATIVE FREE 10 ML: 5 INJECTION INTRAVENOUS at 23:15

## 2020-03-28 RX ADMIN — VANCOMYCIN HYDROCHLORIDE 1750 MG: 10 INJECTION, POWDER, LYOPHILIZED, FOR SOLUTION INTRAVENOUS at 06:06

## 2020-03-28 RX ADMIN — FENTANYL CITRATE 50 MCG: 50 INJECTION, SOLUTION INTRAMUSCULAR; INTRAVENOUS at 13:28

## 2020-03-28 RX ADMIN — FAMOTIDINE 20 MG: 10 INJECTION, SOLUTION INTRAVENOUS at 11:22

## 2020-03-28 NOTE — ANESTHESIA POSTPROCEDURE EVALUATION
"Patient: Rock Maciel Jr.    Procedure Summary     Date:  03/28/20 Room / Location:  [facility] OR  / [facility] MAIN OR    Anesthesia Start:  1200 Anesthesia Stop:  1255    Procedure:  DEBRIDMENT OF RIGHT CALF (Right ) Diagnosis:      Surgeon:  Ross Pineda MD Provider:  Tiago Dunaway MD    Anesthesia Type:  general ASA Status:  3          Anesthesia Type: general    Vitals  Vitals Value Taken Time   /61 3/28/2020  2:05 PM   Temp 36.9 °C (98.4 °F) 3/28/2020  1:50 PM   Pulse 69 3/28/2020  2:05 PM   Resp 16 3/28/2020  2:05 PM   SpO2 98 % 3/28/2020  2:05 PM           Post Anesthesia Care and Evaluation    Patient location during evaluation: bedside  Patient participation: complete - patient participated  Level of consciousness: awake and alert  Pain management: adequate  Airway patency: patent  Anesthetic complications: No anesthetic complications    Cardiovascular status: acceptable  Respiratory status: acceptable  Hydration status: acceptable    Comments: /61   Pulse 69   Temp 36.9 °C (98.4 °F) (Oral)   Resp 16   Ht 185.4 cm (73\")   Wt (!) 141 kg (311 lb 6.4 oz)   SpO2 98%   BMI 41.08 kg/m²           "

## 2020-03-28 NOTE — ANESTHESIA PREPROCEDURE EVALUATION
Anesthesia Evaluation     Patient summary reviewed and Nursing notes reviewed   no history of anesthetic complications:  NPO Solid Status: > 6 hours  NPO Liquid Status: > 6 hours           Airway   Mallampati: III  TM distance: <3 FB  Neck ROM: full  Possible difficult intubation  Dental - normal exam     Pulmonary - normal exam    breath sounds clear to auscultation  (+) sleep apnea,   (-) rhonchi, decreased breath sounds, wheezes, rales, stridor  Cardiovascular - normal exam    NYHA Classification: I  ECG reviewed  Rhythm: regular  Rate: normal    (+) hypertension, valvular problems/murmurs, CAD, CABG >6 Months, PVD,  carotid artery disease  (-) murmur, weak pulses, friction rub, systolic click, carotid bruits, JVD, peripheral edema    ROS comment: EKG - LAFB & RBBB    S/P AVR & CABGx2    Neuro/Psych- negative ROS  GI/Hepatic/Renal/Endo    (+) obesity, morbid obesity, hiatal hernia,  renal disease CRI, diabetes mellitus type 2 poorly controlled using insulin,     Musculoskeletal (-) negative ROS    Abdominal   (+) obese,     Abdomen: soft.   Substance History - negative use     OB/GYN negative ob/gyn ROS         Other - negative ROS                     Anesthesia Plan    ASA 3     general     intravenous induction     Anesthetic plan, all risks, benefits, and alternatives have been provided, discussed and informed consent has been obtained with: patient.

## 2020-03-29 LAB
ANION GAP SERPL CALCULATED.3IONS-SCNC: 10.4 MMOL/L (ref 5–15)
BACTERIA SPEC AEROBE CULT: ABNORMAL
BUN BLD-MCNC: 32 MG/DL (ref 8–23)
BUN/CREAT SERPL: 19.9 (ref 7–25)
CALCIUM SPEC-SCNC: 8.3 MG/DL (ref 8.6–10.5)
CHLORIDE SERPL-SCNC: 100 MMOL/L (ref 98–107)
CO2 SERPL-SCNC: 27.6 MMOL/L (ref 22–29)
CREAT BLD-MCNC: 1.61 MG/DL (ref 0.76–1.27)
DEPRECATED RDW RBC AUTO: 41.8 FL (ref 37–54)
ERYTHROCYTE [DISTWIDTH] IN BLOOD BY AUTOMATED COUNT: 12.8 % (ref 12.3–15.4)
GFR SERPL CREATININE-BSD FRML MDRD: 42 ML/MIN/1.73
GLUCOSE BLD-MCNC: 189 MG/DL (ref 65–99)
GLUCOSE BLDC GLUCOMTR-MCNC: 186 MG/DL (ref 70–130)
GLUCOSE BLDC GLUCOMTR-MCNC: 240 MG/DL (ref 70–130)
GLUCOSE BLDC GLUCOMTR-MCNC: 281 MG/DL (ref 70–130)
GLUCOSE BLDC GLUCOMTR-MCNC: 325 MG/DL (ref 70–130)
GRAM STN SPEC: ABNORMAL
HCT VFR BLD AUTO: 25.2 % (ref 37.5–51)
HGB BLD-MCNC: 8.6 G/DL (ref 13–17.7)
MAGNESIUM SERPL-MCNC: 2.4 MG/DL (ref 1.6–2.4)
MCH RBC QN AUTO: 30.8 PG (ref 26.6–33)
MCHC RBC AUTO-ENTMCNC: 34.1 G/DL (ref 31.5–35.7)
MCV RBC AUTO: 90.3 FL (ref 79–97)
PHOSPHATE SERPL-MCNC: 3.3 MG/DL (ref 2.5–4.5)
PLATELET # BLD AUTO: 193 10*3/MM3 (ref 140–450)
PMV BLD AUTO: 9.7 FL (ref 6–12)
POTASSIUM BLD-SCNC: 3.9 MMOL/L (ref 3.5–5.2)
RBC # BLD AUTO: 2.79 10*6/MM3 (ref 4.14–5.8)
SODIUM BLD-SCNC: 138 MMOL/L (ref 136–145)
VANCOMYCIN SERPL-MCNC: 19.4 MCG/ML (ref 5–40)
WBC NRBC COR # BLD: 6.57 10*3/MM3 (ref 3.4–10.8)

## 2020-03-29 PROCEDURE — 97162 PT EVAL MOD COMPLEX 30 MIN: CPT

## 2020-03-29 PROCEDURE — 80048 BASIC METABOLIC PNL TOTAL CA: CPT | Performed by: SURGERY

## 2020-03-29 PROCEDURE — 97110 THERAPEUTIC EXERCISES: CPT

## 2020-03-29 PROCEDURE — 63710000001 INSULIN LISPRO (HUMAN) PER 5 UNITS: Performed by: SURGERY

## 2020-03-29 PROCEDURE — 83735 ASSAY OF MAGNESIUM: CPT | Performed by: SURGERY

## 2020-03-29 PROCEDURE — 80202 ASSAY OF VANCOMYCIN: CPT | Performed by: NURSE PRACTITIONER

## 2020-03-29 PROCEDURE — 25010000002 VANCOMYCIN PER 500 MG: Performed by: INTERNAL MEDICINE

## 2020-03-29 PROCEDURE — 82962 GLUCOSE BLOOD TEST: CPT

## 2020-03-29 PROCEDURE — 85027 COMPLETE CBC AUTOMATED: CPT | Performed by: SURGERY

## 2020-03-29 PROCEDURE — 25010000002 PIPERACILLIN SOD-TAZOBACTAM PER 1 G: Performed by: SURGERY

## 2020-03-29 PROCEDURE — 25010000002 IRON SUCROSE PER 1 MG: Performed by: INTERNAL MEDICINE

## 2020-03-29 PROCEDURE — 25010000002 ONDANSETRON PER 1 MG: Performed by: SURGERY

## 2020-03-29 PROCEDURE — 84100 ASSAY OF PHOSPHORUS: CPT | Performed by: SURGERY

## 2020-03-29 PROCEDURE — 25010000002 HYDROMORPHONE PER 4 MG: Performed by: SURGERY

## 2020-03-29 RX ORDER — VANCOMYCIN HYDROCHLORIDE 1 G/200ML
1000 INJECTION, SOLUTION INTRAVENOUS EVERY 12 HOURS
Status: DISCONTINUED | OUTPATIENT
Start: 2020-03-29 | End: 2020-03-29 | Stop reason: CLARIF

## 2020-03-29 RX ADMIN — HYDRALAZINE HYDROCHLORIDE 25 MG: 25 TABLET, FILM COATED ORAL at 21:56

## 2020-03-29 RX ADMIN — TAZOBACTAM SODIUM AND PIPERACILLIN SODIUM 4.5 G: 500; 4 INJECTION, SOLUTION INTRAVENOUS at 22:11

## 2020-03-29 RX ADMIN — TAZOBACTAM SODIUM AND PIPERACILLIN SODIUM 4.5 G: 500; 4 INJECTION, SOLUTION INTRAVENOUS at 14:53

## 2020-03-29 RX ADMIN — HYDRALAZINE HYDROCHLORIDE 25 MG: 25 TABLET, FILM COATED ORAL at 08:46

## 2020-03-29 RX ADMIN — TAZOBACTAM SODIUM AND PIPERACILLIN SODIUM 4.5 G: 500; 4 INJECTION, SOLUTION INTRAVENOUS at 06:43

## 2020-03-29 RX ADMIN — INSULIN LISPRO 5 UNITS: 100 INJECTION, SOLUTION INTRAVENOUS; SUBCUTANEOUS at 11:36

## 2020-03-29 RX ADMIN — INSULIN LISPRO 3 UNITS: 100 INJECTION, SOLUTION INTRAVENOUS; SUBCUTANEOUS at 17:38

## 2020-03-29 RX ADMIN — CLOPIDOGREL 75 MG: 75 TABLET, FILM COATED ORAL at 08:46

## 2020-03-29 RX ADMIN — ATORVASTATIN CALCIUM 20 MG: 20 TABLET, FILM COATED ORAL at 21:56

## 2020-03-29 RX ADMIN — VANCOMYCIN HYDROCHLORIDE 1000 MG: 1 INJECTION, SOLUTION INTRAVENOUS at 10:16

## 2020-03-29 RX ADMIN — DAKIN'S SOLUTION 0.125% (QUARTER STRENGTH): 0.12 SOLUTION at 22:07

## 2020-03-29 RX ADMIN — INSULIN LISPRO 2 UNITS: 100 INJECTION, SOLUTION INTRAVENOUS; SUBCUTANEOUS at 08:46

## 2020-03-29 RX ADMIN — INSULIN LISPRO 4 UNITS: 100 INJECTION, SOLUTION INTRAVENOUS; SUBCUTANEOUS at 22:10

## 2020-03-29 RX ADMIN — ACETAMINOPHEN 650 MG: 325 TABLET, FILM COATED ORAL at 00:13

## 2020-03-29 RX ADMIN — VANCOMYCIN HYDROCHLORIDE 1000 MG: 10 INJECTION, POWDER, LYOPHILIZED, FOR SOLUTION INTRAVENOUS at 22:16

## 2020-03-29 RX ADMIN — IRON SUCROSE 200 MG: 20 INJECTION, SOLUTION INTRAVENOUS at 21:56

## 2020-03-29 RX ADMIN — BUMETANIDE 1 MG: 1 TABLET ORAL at 21:58

## 2020-03-29 RX ADMIN — ONDANSETRON 4 MG: 2 INJECTION INTRAMUSCULAR; INTRAVENOUS at 22:42

## 2020-03-29 RX ADMIN — DAKIN'S SOLUTION 0.125% (QUARTER STRENGTH): 0.12 SOLUTION at 08:47

## 2020-03-29 RX ADMIN — METOPROLOL TARTRATE 25 MG: 25 TABLET ORAL at 21:58

## 2020-03-29 RX ADMIN — METOPROLOL TARTRATE 25 MG: 25 TABLET ORAL at 08:46

## 2020-03-29 RX ADMIN — HYDROMORPHONE HYDROCHLORIDE 0.5 MG: 1 INJECTION, SOLUTION INTRAMUSCULAR; INTRAVENOUS; SUBCUTANEOUS at 22:43

## 2020-03-29 RX ADMIN — BUMETANIDE 1 MG: 1 TABLET ORAL at 08:46

## 2020-03-29 RX ADMIN — SODIUM CHLORIDE, PRESERVATIVE FREE 10 ML: 5 INJECTION INTRAVENOUS at 22:08

## 2020-03-30 PROBLEM — K59.00 CONSTIPATION: Status: ACTIVE | Noted: 2020-03-30

## 2020-03-30 PROBLEM — I50.32 CHRONIC DIASTOLIC (CONGESTIVE) HEART FAILURE: Status: ACTIVE | Noted: 2020-03-30

## 2020-03-30 PROBLEM — I51.89 DIASTOLIC DYSFUNCTION: Status: ACTIVE | Noted: 2020-03-30

## 2020-03-30 LAB
ALBUMIN SERPL-MCNC: 2.5 G/DL (ref 2.9–4.4)
ALBUMIN/GLOB SERPL: 0.8 {RATIO} (ref 0.7–1.7)
ALPHA1 GLOB FLD ELPH-MCNC: 0.3 G/DL (ref 0–0.4)
ALPHA2 GLOB SERPL ELPH-MCNC: 0.7 G/DL (ref 0.4–1)
ANION GAP SERPL CALCULATED.3IONS-SCNC: 10.5 MMOL/L (ref 5–15)
B-GLOBULIN SERPL ELPH-MCNC: 0.8 G/DL (ref 0.7–1.3)
BACTERIA SPEC AEROBE CULT: ABNORMAL
BUN BLD-MCNC: 26 MG/DL (ref 8–23)
BUN/CREAT SERPL: 18.2 (ref 7–25)
CALCIUM SPEC-SCNC: 8.6 MG/DL (ref 8.6–10.5)
CHLORIDE SERPL-SCNC: 98 MMOL/L (ref 98–107)
CK SERPL-CCNC: 39 U/L (ref 20–200)
CO2 SERPL-SCNC: 29.5 MMOL/L (ref 22–29)
CREAT BLD-MCNC: 1.43 MG/DL (ref 0.76–1.27)
DEPRECATED RDW RBC AUTO: 42.2 FL (ref 37–54)
ERYTHROCYTE [DISTWIDTH] IN BLOOD BY AUTOMATED COUNT: 12.9 % (ref 12.3–15.4)
GAMMA GLOB SERPL ELPH-MCNC: 1.2 G/DL (ref 0.4–1.8)
GFR SERPL CREATININE-BSD FRML MDRD: 48 ML/MIN/1.73
GLOBULIN SER CALC-MCNC: 3.1 G/DL (ref 2.2–3.9)
GLUCOSE BLD-MCNC: 164 MG/DL (ref 65–99)
GLUCOSE BLDC GLUCOMTR-MCNC: 181 MG/DL (ref 70–130)
GLUCOSE BLDC GLUCOMTR-MCNC: 294 MG/DL (ref 70–130)
GLUCOSE BLDC GLUCOMTR-MCNC: 296 MG/DL (ref 70–130)
GLUCOSE BLDC GLUCOMTR-MCNC: 318 MG/DL (ref 70–130)
GRAM STN SPEC: ABNORMAL
HCT VFR BLD AUTO: 27.7 % (ref 37.5–51)
HGB BLD-MCNC: 9.1 G/DL (ref 13–17.7)
Lab: ABNORMAL
M-SPIKE: ABNORMAL G/DL
MAGNESIUM SERPL-MCNC: 2.2 MG/DL (ref 1.6–2.4)
MCH RBC QN AUTO: 29.9 PG (ref 26.6–33)
MCHC RBC AUTO-ENTMCNC: 32.9 G/DL (ref 31.5–35.7)
MCV RBC AUTO: 91.1 FL (ref 79–97)
PHOSPHATE SERPL-MCNC: 3.8 MG/DL (ref 2.5–4.5)
PLATELET # BLD AUTO: 208 10*3/MM3 (ref 140–450)
PMV BLD AUTO: 9.9 FL (ref 6–12)
POTASSIUM BLD-SCNC: 3.9 MMOL/L (ref 3.5–5.2)
PROT PATTERN SERPL ELPH-IMP: ABNORMAL
PROT SERPL-MCNC: 5.6 G/DL (ref 6–8.5)
RBC # BLD AUTO: 3.04 10*6/MM3 (ref 4.14–5.8)
SODIUM BLD-SCNC: 138 MMOL/L (ref 136–145)
WBC NRBC COR # BLD: 5.24 10*3/MM3 (ref 3.4–10.8)

## 2020-03-30 PROCEDURE — 25010000002 VANCOMYCIN 10 G RECONSTITUTED SOLUTION 10,000 MG VIAL: Performed by: INTERNAL MEDICINE

## 2020-03-30 PROCEDURE — 25010000002 PIPERACILLIN SOD-TAZOBACTAM PER 1 G: Performed by: INTERNAL MEDICINE

## 2020-03-30 PROCEDURE — 80048 BASIC METABOLIC PNL TOTAL CA: CPT | Performed by: INTERNAL MEDICINE

## 2020-03-30 PROCEDURE — 83735 ASSAY OF MAGNESIUM: CPT | Performed by: INTERNAL MEDICINE

## 2020-03-30 PROCEDURE — 82962 GLUCOSE BLOOD TEST: CPT

## 2020-03-30 PROCEDURE — 25010000002 IRON SUCROSE PER 1 MG: Performed by: INTERNAL MEDICINE

## 2020-03-30 PROCEDURE — 85027 COMPLETE CBC AUTOMATED: CPT | Performed by: INTERNAL MEDICINE

## 2020-03-30 PROCEDURE — 82550 ASSAY OF CK (CPK): CPT | Performed by: INTERNAL MEDICINE

## 2020-03-30 PROCEDURE — 63710000001 INSULIN LISPRO (HUMAN) PER 5 UNITS: Performed by: SURGERY

## 2020-03-30 PROCEDURE — 25010000002 PIPERACILLIN SOD-TAZOBACTAM PER 1 G: Performed by: SURGERY

## 2020-03-30 PROCEDURE — 97166 OT EVAL MOD COMPLEX 45 MIN: CPT

## 2020-03-30 PROCEDURE — 84100 ASSAY OF PHOSPHORUS: CPT | Performed by: INTERNAL MEDICINE

## 2020-03-30 PROCEDURE — 99222 1ST HOSP IP/OBS MODERATE 55: CPT | Performed by: INTERNAL MEDICINE

## 2020-03-30 PROCEDURE — 25010000002 HYDROMORPHONE PER 4 MG: Performed by: SURGERY

## 2020-03-30 RX ORDER — BUMETANIDE 2 MG/1
2 TABLET ORAL 2 TIMES DAILY
Status: DISCONTINUED | OUTPATIENT
Start: 2020-03-30 | End: 2020-04-01

## 2020-03-30 RX ORDER — CIPROFLOXACIN 500 MG/1
500 TABLET, FILM COATED ORAL EVERY 12 HOURS SCHEDULED
Status: DISCONTINUED | OUTPATIENT
Start: 2020-03-30 | End: 2020-04-03 | Stop reason: HOSPADM

## 2020-03-30 RX ORDER — BUMETANIDE 0.25 MG/ML
2 INJECTION INTRAMUSCULAR; INTRAVENOUS ONCE
Status: COMPLETED | OUTPATIENT
Start: 2020-03-30 | End: 2020-03-30

## 2020-03-30 RX ORDER — POLYETHYLENE GLYCOL 3350 17 G/17G
17 POWDER, FOR SOLUTION ORAL DAILY
Status: DISCONTINUED | OUTPATIENT
Start: 2020-03-30 | End: 2020-04-03 | Stop reason: HOSPADM

## 2020-03-30 RX ADMIN — BUMETANIDE 1 MG: 1 TABLET ORAL at 09:52

## 2020-03-30 RX ADMIN — INSULIN LISPRO 5 UNITS: 100 INJECTION, SOLUTION INTRAVENOUS; SUBCUTANEOUS at 18:32

## 2020-03-30 RX ADMIN — HYDRALAZINE HYDROCHLORIDE 25 MG: 25 TABLET, FILM COATED ORAL at 09:52

## 2020-03-30 RX ADMIN — DAKIN'S SOLUTION 0.125% (QUARTER STRENGTH) 1 ML: 0.12 SOLUTION at 09:52

## 2020-03-30 RX ADMIN — TAZOBACTAM SODIUM AND PIPERACILLIN SODIUM 4.5 G: 500; 4 INJECTION, SOLUTION INTRAVENOUS at 06:24

## 2020-03-30 RX ADMIN — CLOPIDOGREL 75 MG: 75 TABLET, FILM COATED ORAL at 09:53

## 2020-03-30 RX ADMIN — HYDROMORPHONE HYDROCHLORIDE 0.5 MG: 1 INJECTION, SOLUTION INTRAMUSCULAR; INTRAVENOUS; SUBCUTANEOUS at 14:13

## 2020-03-30 RX ADMIN — METOPROLOL TARTRATE 25 MG: 25 TABLET ORAL at 09:52

## 2020-03-30 RX ADMIN — HYDROMORPHONE HYDROCHLORIDE 0.5 MG: 1 INJECTION, SOLUTION INTRAMUSCULAR; INTRAVENOUS; SUBCUTANEOUS at 09:55

## 2020-03-30 RX ADMIN — INSULIN LISPRO 4 UNITS: 100 INJECTION, SOLUTION INTRAVENOUS; SUBCUTANEOUS at 12:55

## 2020-03-30 RX ADMIN — METOPROLOL TARTRATE 25 MG: 25 TABLET ORAL at 21:29

## 2020-03-30 RX ADMIN — IRON SUCROSE 200 MG: 20 INJECTION, SOLUTION INTRAVENOUS at 18:32

## 2020-03-30 RX ADMIN — TAZOBACTAM SODIUM AND PIPERACILLIN SODIUM 4.5 G: 500; 4 INJECTION, SOLUTION INTRAVENOUS at 14:13

## 2020-03-30 RX ADMIN — HYDRALAZINE HYDROCHLORIDE 25 MG: 25 TABLET, FILM COATED ORAL at 21:09

## 2020-03-30 RX ADMIN — ATORVASTATIN CALCIUM 20 MG: 20 TABLET, FILM COATED ORAL at 21:17

## 2020-03-30 RX ADMIN — INSULIN LISPRO 4 UNITS: 100 INJECTION, SOLUTION INTRAVENOUS; SUBCUTANEOUS at 21:28

## 2020-03-30 RX ADMIN — POLYETHYLENE GLYCOL 3350 17 G: 17 POWDER, FOR SOLUTION ORAL at 12:55

## 2020-03-30 RX ADMIN — CIPROFLOXACIN HYDROCHLORIDE 500 MG: 500 TABLET, FILM COATED ORAL at 21:09

## 2020-03-30 RX ADMIN — BUMETANIDE 2 MG: 2 TABLET ORAL at 21:09

## 2020-03-30 RX ADMIN — BUMETANIDE 2 MG: 0.25 INJECTION INTRAMUSCULAR; INTRAVENOUS at 19:20

## 2020-03-30 RX ADMIN — VANCOMYCIN HYDROCHLORIDE 1000 MG: 10 INJECTION, POWDER, LYOPHILIZED, FOR SOLUTION INTRAVENOUS at 09:53

## 2020-03-30 RX ADMIN — TRAMADOL HYDROCHLORIDE 50 MG: 50 TABLET, FILM COATED ORAL at 21:09

## 2020-03-31 LAB
ALBUMIN SERPL-MCNC: 2.7 G/DL (ref 3.5–5.2)
ANION GAP SERPL CALCULATED.3IONS-SCNC: 11.1 MMOL/L (ref 5–15)
BASOPHILS # BLD AUTO: 0.02 10*3/MM3 (ref 0–0.2)
BASOPHILS NFR BLD AUTO: 0.4 % (ref 0–1.5)
BUN BLD-MCNC: 28 MG/DL (ref 8–23)
BUN/CREAT SERPL: 18.7 (ref 7–25)
CALCIUM SPEC-SCNC: 8.9 MG/DL (ref 8.6–10.5)
CHLORIDE SERPL-SCNC: 95 MMOL/L (ref 98–107)
CO2 SERPL-SCNC: 30.9 MMOL/L (ref 22–29)
CREAT BLD-MCNC: 1.5 MG/DL (ref 0.76–1.27)
DEPRECATED RDW RBC AUTO: 42.8 FL (ref 37–54)
EOSINOPHIL # BLD AUTO: 0.3 10*3/MM3 (ref 0–0.4)
EOSINOPHIL NFR BLD AUTO: 6.5 % (ref 0.3–6.2)
ERYTHROCYTE [DISTWIDTH] IN BLOOD BY AUTOMATED COUNT: 13.1 % (ref 12.3–15.4)
GFR SERPL CREATININE-BSD FRML MDRD: 46 ML/MIN/1.73
GLUCOSE BLD-MCNC: 143 MG/DL (ref 65–99)
GLUCOSE BLDC GLUCOMTR-MCNC: 145 MG/DL (ref 70–130)
GLUCOSE BLDC GLUCOMTR-MCNC: 294 MG/DL (ref 70–130)
GLUCOSE BLDC GLUCOMTR-MCNC: 302 MG/DL (ref 70–130)
GLUCOSE BLDC GLUCOMTR-MCNC: 316 MG/DL (ref 70–130)
HCT VFR BLD AUTO: 27.3 % (ref 37.5–51)
HGB BLD-MCNC: 9.1 G/DL (ref 13–17.7)
IMM GRANULOCYTES # BLD AUTO: 0.02 10*3/MM3 (ref 0–0.05)
IMM GRANULOCYTES NFR BLD AUTO: 0.4 % (ref 0–0.5)
LYMPHOCYTES # BLD AUTO: 0.97 10*3/MM3 (ref 0.7–3.1)
LYMPHOCYTES NFR BLD AUTO: 21 % (ref 19.6–45.3)
MAGNESIUM SERPL-MCNC: 2.2 MG/DL (ref 1.6–2.4)
MCH RBC QN AUTO: 30.4 PG (ref 26.6–33)
MCHC RBC AUTO-ENTMCNC: 33.3 G/DL (ref 31.5–35.7)
MCV RBC AUTO: 91.3 FL (ref 79–97)
MONOCYTES # BLD AUTO: 0.38 10*3/MM3 (ref 0.1–0.9)
MONOCYTES NFR BLD AUTO: 8.2 % (ref 5–12)
NEUTROPHILS # BLD AUTO: 2.92 10*3/MM3 (ref 1.7–7)
NEUTROPHILS NFR BLD AUTO: 63.5 % (ref 42.7–76)
NRBC BLD AUTO-RTO: 0.2 /100 WBC (ref 0–0.2)
PHOSPHATE SERPL-MCNC: 4.3 MG/DL (ref 2.5–4.5)
PLATELET # BLD AUTO: 220 10*3/MM3 (ref 140–450)
PMV BLD AUTO: 9.6 FL (ref 6–12)
POTASSIUM BLD-SCNC: 4 MMOL/L (ref 3.5–5.2)
RBC # BLD AUTO: 2.99 10*6/MM3 (ref 4.14–5.8)
SODIUM BLD-SCNC: 137 MMOL/L (ref 136–145)
WBC NRBC COR # BLD: 4.61 10*3/MM3 (ref 3.4–10.8)

## 2020-03-31 PROCEDURE — 85025 COMPLETE CBC W/AUTO DIFF WBC: CPT | Performed by: INTERNAL MEDICINE

## 2020-03-31 PROCEDURE — 83735 ASSAY OF MAGNESIUM: CPT | Performed by: INTERNAL MEDICINE

## 2020-03-31 PROCEDURE — 80069 RENAL FUNCTION PANEL: CPT | Performed by: INTERNAL MEDICINE

## 2020-03-31 PROCEDURE — 82962 GLUCOSE BLOOD TEST: CPT

## 2020-03-31 PROCEDURE — 97110 THERAPEUTIC EXERCISES: CPT

## 2020-03-31 PROCEDURE — 25010000002 ONDANSETRON PER 1 MG: Performed by: SURGERY

## 2020-03-31 PROCEDURE — 25010000002 HYDROMORPHONE PER 4 MG: Performed by: SURGERY

## 2020-03-31 PROCEDURE — 63710000001 INSULIN LISPRO (HUMAN) PER 5 UNITS: Performed by: SURGERY

## 2020-03-31 RX ADMIN — METOPROLOL TARTRATE 25 MG: 25 TABLET ORAL at 09:49

## 2020-03-31 RX ADMIN — INSULIN LISPRO 4 UNITS: 100 INJECTION, SOLUTION INTRAVENOUS; SUBCUTANEOUS at 17:37

## 2020-03-31 RX ADMIN — HYDRALAZINE HYDROCHLORIDE 25 MG: 25 TABLET, FILM COATED ORAL at 09:49

## 2020-03-31 RX ADMIN — CLOPIDOGREL 75 MG: 75 TABLET, FILM COATED ORAL at 09:49

## 2020-03-31 RX ADMIN — HYDROMORPHONE HYDROCHLORIDE 0.5 MG: 1 INJECTION, SOLUTION INTRAMUSCULAR; INTRAVENOUS; SUBCUTANEOUS at 04:04

## 2020-03-31 RX ADMIN — ONDANSETRON 4 MG: 2 INJECTION INTRAMUSCULAR; INTRAVENOUS at 04:04

## 2020-03-31 RX ADMIN — ATORVASTATIN CALCIUM 20 MG: 20 TABLET, FILM COATED ORAL at 21:13

## 2020-03-31 RX ADMIN — BUMETANIDE 2 MG: 2 TABLET ORAL at 21:13

## 2020-03-31 RX ADMIN — METOPROLOL TARTRATE 25 MG: 25 TABLET ORAL at 21:13

## 2020-03-31 RX ADMIN — CIPROFLOXACIN HYDROCHLORIDE 500 MG: 500 TABLET, FILM COATED ORAL at 09:49

## 2020-03-31 RX ADMIN — INSULIN LISPRO 5 UNITS: 100 INJECTION, SOLUTION INTRAVENOUS; SUBCUTANEOUS at 12:11

## 2020-03-31 RX ADMIN — SODIUM CHLORIDE, PRESERVATIVE FREE 10 ML: 5 INJECTION INTRAVENOUS at 21:13

## 2020-03-31 RX ADMIN — HYDROMORPHONE HYDROCHLORIDE 0.5 MG: 1 INJECTION, SOLUTION INTRAMUSCULAR; INTRAVENOUS; SUBCUTANEOUS at 21:13

## 2020-03-31 RX ADMIN — POLYETHYLENE GLYCOL 3350 17 G: 17 POWDER, FOR SOLUTION ORAL at 09:49

## 2020-03-31 RX ADMIN — SODIUM CHLORIDE, PRESERVATIVE FREE 10 ML: 5 INJECTION INTRAVENOUS at 04:05

## 2020-03-31 RX ADMIN — INSULIN LISPRO 5 UNITS: 100 INJECTION, SOLUTION INTRAVENOUS; SUBCUTANEOUS at 21:25

## 2020-03-31 RX ADMIN — CIPROFLOXACIN HYDROCHLORIDE 500 MG: 500 TABLET, FILM COATED ORAL at 21:13

## 2020-03-31 RX ADMIN — HYDRALAZINE HYDROCHLORIDE 25 MG: 25 TABLET, FILM COATED ORAL at 21:13

## 2020-03-31 RX ADMIN — BUMETANIDE 2 MG: 2 TABLET ORAL at 09:49

## 2020-03-31 RX ADMIN — SODIUM CHLORIDE, PRESERVATIVE FREE 10 ML: 5 INJECTION INTRAVENOUS at 09:50

## 2020-04-01 LAB
ALBUMIN SERPL-MCNC: 2.9 G/DL (ref 3.5–5.2)
ANION GAP SERPL CALCULATED.3IONS-SCNC: 11.5 MMOL/L (ref 5–15)
BASOPHILS # BLD AUTO: 0.01 10*3/MM3 (ref 0–0.2)
BASOPHILS NFR BLD AUTO: 0.2 % (ref 0–1.5)
BUN BLD-MCNC: 33 MG/DL (ref 8–23)
BUN/CREAT SERPL: 20.2 (ref 7–25)
CALCIUM SPEC-SCNC: 9.3 MG/DL (ref 8.6–10.5)
CHLORIDE SERPL-SCNC: 95 MMOL/L (ref 98–107)
CO2 SERPL-SCNC: 30.5 MMOL/L (ref 22–29)
CREAT BLD-MCNC: 1.63 MG/DL (ref 0.76–1.27)
DEPRECATED RDW RBC AUTO: 43.2 FL (ref 37–54)
EOSINOPHIL # BLD AUTO: 0.25 10*3/MM3 (ref 0–0.4)
EOSINOPHIL NFR BLD AUTO: 5.1 % (ref 0.3–6.2)
ERYTHROCYTE [DISTWIDTH] IN BLOOD BY AUTOMATED COUNT: 12.9 % (ref 12.3–15.4)
GFR SERPL CREATININE-BSD FRML MDRD: 41 ML/MIN/1.73
GLUCOSE BLD-MCNC: 200 MG/DL (ref 65–99)
GLUCOSE BLDC GLUCOMTR-MCNC: 199 MG/DL (ref 70–130)
GLUCOSE BLDC GLUCOMTR-MCNC: 285 MG/DL (ref 70–130)
GLUCOSE BLDC GLUCOMTR-MCNC: 286 MG/DL (ref 70–130)
GLUCOSE BLDC GLUCOMTR-MCNC: 329 MG/DL (ref 70–130)
HCT VFR BLD AUTO: 30 % (ref 37.5–51)
HGB BLD-MCNC: 10.1 G/DL (ref 13–17.7)
IMM GRANULOCYTES # BLD AUTO: 0.02 10*3/MM3 (ref 0–0.05)
IMM GRANULOCYTES NFR BLD AUTO: 0.4 % (ref 0–0.5)
LYMPHOCYTES # BLD AUTO: 0.92 10*3/MM3 (ref 0.7–3.1)
LYMPHOCYTES NFR BLD AUTO: 18.7 % (ref 19.6–45.3)
MCH RBC QN AUTO: 31 PG (ref 26.6–33)
MCHC RBC AUTO-ENTMCNC: 33.7 G/DL (ref 31.5–35.7)
MCV RBC AUTO: 92 FL (ref 79–97)
MONOCYTES # BLD AUTO: 0.36 10*3/MM3 (ref 0.1–0.9)
MONOCYTES NFR BLD AUTO: 7.3 % (ref 5–12)
NEUTROPHILS # BLD AUTO: 3.37 10*3/MM3 (ref 1.7–7)
NEUTROPHILS NFR BLD AUTO: 68.3 % (ref 42.7–76)
NRBC BLD AUTO-RTO: 0 /100 WBC (ref 0–0.2)
PHOSPHATE SERPL-MCNC: 3.6 MG/DL (ref 2.5–4.5)
PLATELET # BLD AUTO: 218 10*3/MM3 (ref 140–450)
PMV BLD AUTO: 9.1 FL (ref 6–12)
POTASSIUM BLD-SCNC: 3.8 MMOL/L (ref 3.5–5.2)
RBC # BLD AUTO: 3.26 10*6/MM3 (ref 4.14–5.8)
SODIUM BLD-SCNC: 137 MMOL/L (ref 136–145)
WBC NRBC COR # BLD: 4.93 10*3/MM3 (ref 3.4–10.8)

## 2020-04-01 PROCEDURE — 80069 RENAL FUNCTION PANEL: CPT | Performed by: INTERNAL MEDICINE

## 2020-04-01 PROCEDURE — 82962 GLUCOSE BLOOD TEST: CPT

## 2020-04-01 PROCEDURE — 63710000001 INSULIN LISPRO (HUMAN) PER 5 UNITS: Performed by: SURGERY

## 2020-04-01 PROCEDURE — 97110 THERAPEUTIC EXERCISES: CPT

## 2020-04-01 PROCEDURE — 85025 COMPLETE CBC W/AUTO DIFF WBC: CPT | Performed by: INTERNAL MEDICINE

## 2020-04-01 RX ORDER — BUMETANIDE 2 MG/1
2 TABLET ORAL DAILY
Status: DISCONTINUED | OUTPATIENT
Start: 2020-04-02 | End: 2020-04-03 | Stop reason: HOSPADM

## 2020-04-01 RX ORDER — HYDRALAZINE HYDROCHLORIDE 50 MG/1
50 TABLET, FILM COATED ORAL EVERY 8 HOURS SCHEDULED
Status: DISCONTINUED | OUTPATIENT
Start: 2020-04-01 | End: 2020-04-03 | Stop reason: HOSPADM

## 2020-04-01 RX ADMIN — SODIUM CHLORIDE, PRESERVATIVE FREE 10 ML: 5 INJECTION INTRAVENOUS at 08:29

## 2020-04-01 RX ADMIN — INSULIN LISPRO 4 UNITS: 100 INJECTION, SOLUTION INTRAVENOUS; SUBCUTANEOUS at 17:40

## 2020-04-01 RX ADMIN — HYDRALAZINE HYDROCHLORIDE 25 MG: 25 TABLET, FILM COATED ORAL at 08:27

## 2020-04-01 RX ADMIN — CLOPIDOGREL 75 MG: 75 TABLET, FILM COATED ORAL at 08:27

## 2020-04-01 RX ADMIN — TRAMADOL HYDROCHLORIDE 50 MG: 50 TABLET, FILM COATED ORAL at 12:19

## 2020-04-01 RX ADMIN — CIPROFLOXACIN HYDROCHLORIDE 500 MG: 500 TABLET, FILM COATED ORAL at 20:17

## 2020-04-01 RX ADMIN — METOPROLOL TARTRATE 25 MG: 25 TABLET ORAL at 20:17

## 2020-04-01 RX ADMIN — BUMETANIDE 2 MG: 2 TABLET ORAL at 08:27

## 2020-04-01 RX ADMIN — METOPROLOL TARTRATE 25 MG: 25 TABLET ORAL at 08:27

## 2020-04-01 RX ADMIN — HYDRALAZINE HYDROCHLORIDE 50 MG: 50 TABLET, FILM COATED ORAL at 22:06

## 2020-04-01 RX ADMIN — INSULIN LISPRO 5 UNITS: 100 INJECTION, SOLUTION INTRAVENOUS; SUBCUTANEOUS at 12:19

## 2020-04-01 RX ADMIN — SODIUM CHLORIDE, PRESERVATIVE FREE 10 ML: 5 INJECTION INTRAVENOUS at 20:18

## 2020-04-01 RX ADMIN — ATORVASTATIN CALCIUM 20 MG: 20 TABLET, FILM COATED ORAL at 20:17

## 2020-04-01 RX ADMIN — INSULIN LISPRO 4 UNITS: 100 INJECTION, SOLUTION INTRAVENOUS; SUBCUTANEOUS at 22:06

## 2020-04-01 RX ADMIN — CIPROFLOXACIN HYDROCHLORIDE 500 MG: 500 TABLET, FILM COATED ORAL at 08:27

## 2020-04-01 RX ADMIN — INSULIN LISPRO 3 UNITS: 100 INJECTION, SOLUTION INTRAVENOUS; SUBCUTANEOUS at 08:27

## 2020-04-01 NOTE — PROGRESS NOTES
Name: Rock Maciel Jr. ADMIT: 3/26/2020   : 1945  PCP: Karen Red MD    MRN: [id number] LOS: 5 days   AGE/SEX: 74 y.o. male  ROOM: [facility]     Subjective   Subjective   Lying in bed. No new complaints.  He worked with PT yesterday and required moderate assistance to sit to stand. This is below his baseline. Wanting to go home, but has been at [facility] in past. Denies nausea, vomiting or diarrhea. Denies chest pain or dyspnea. No cough.      Objective   Objective   Vital Signs  Temp:  [97.8 °F (36.6 °C)-97.9 °F (36.6 °C)] 97.9 °F (36.6 °C)  Heart Rate:  [62-70] 65  Resp:  [16] 16  BP: (141-173)/(57-64) 141/57     on  Flow (L/min):  [1] 1;   Device (Oxygen Therapy): nasal cannula  Body mass index is 39.29 kg/m².     Physical Exam   Constitutional: He is oriented to person, place, and time. He appears well-developed. No distress.   HENT:   Head: Normocephalic and atraumatic.   Nose: Nose normal.   Mouth/Throat: Oropharynx is clear and moist.   Eyes: Conjunctivae are normal. Right eye exhibits no discharge. Left eye exhibits no discharge.   Neck: Normal range of motion. Neck supple.   Cardiovascular: Normal rate, regular rhythm, normal heart sounds and intact distal pulses.   Pulmonary/Chest: Effort normal. No respiratory distress. He has decreased breath sounds.   Abdominal: Soft. Bowel sounds are normal. He exhibits no distension. There is no tenderness.   Musculoskeletal: Normal range of motion. He exhibits edema (moderate/severe 2+ bilateral. left appears improved today). He exhibits no tenderness.   Neurological: He is alert and oriented to person, place, and time. He exhibits normal muscle tone. Coordination normal.   Skin: Skin is warm and dry. He is not diaphoretic. No erythema.   Right lower extremity wrapped. Wound vac in place.   Psychiatric: He has a normal mood and affect. His behavior is normal.   Nursing note and vitals reviewed.     Results Review:       I reviewed the patient's new  clinical results.  Results from last 7 days   Lab Units 04/01/20  0734 03/31/20  0709 03/30/20  0717 03/29/20  0507   WBC 10*3/mm3 4.93 4.61 5.24 6.57   HEMOGLOBIN g/dL 10.1* 9.1* 9.1* 8.6*   PLATELETS 10*3/mm3 218 220 208 193     Results from last 7 days   Lab Units 04/01/20  0734 03/31/20  0709 03/30/20  0717 03/29/20  0507   SODIUM mmol/L 137 137 138 138   POTASSIUM mmol/L 3.8 4.0 3.9 3.9   CHLORIDE mmol/L 95* 95* 98 100   CO2 mmol/L 30.5* 30.9* 29.5* 27.6   BUN mg/dL 33* 28* 26* 32*   CREATININE mg/dL 1.63* 1.50* 1.43* 1.61*   GLUCOSE mg/dL 200* 143* 164* 189*   Estimated Creatinine Clearance: 56.5 mL/min (A) (by C-G formula based on SCr of 1.63 mg/dL (H)).  Results from last 7 days   Lab Units 04/01/20  0734 03/31/20  0709 03/28/20  0618 03/27/20  1117 03/26/20  1632   ALBUMIN g/dL 2.90* 2.70* 2.80* 2.5* 3.90   BILIRUBIN mg/dL  --   --  0.9  --  0.8   ALK PHOS U/L  --   --  77  --  112   AST (SGOT) U/L  --   --  17  --  14   ALT (SGPT) U/L  --   --  10  --  9     Results from last 7 days   Lab Units 04/01/20  0734 03/31/20  0709 03/30/20  0717 03/29/20  0507 03/28/20  0618  03/27/20  1117   CALCIUM mg/dL 9.3 8.9 8.6 8.3* 8.7  --   --    ALBUMIN g/dL 2.90* 2.70*  --   --  2.80*  --  2.5*   MAGNESIUM mg/dL  --  2.2 2.2 2.4 2.6*  --   --    PHOSPHORUS mg/dL 3.6 4.3 3.8 3.3 3.1   < >  --     < > = values in this interval not displayed.       Glucose   Date/Time Value Ref Range Status   04/01/2020 1131 329 (H) 70 - 130 mg/dL Final   04/01/2020 0620 199 (H) 70 - 130 mg/dL Final   03/31/2020 1958 316 (H) 70 - 130 mg/dL Final   03/31/2020 1641 294 (H) 70 - 130 mg/dL Final   03/31/2020 1108 302 (H) 70 - 130 mg/dL Final   03/31/2020 0632 145 (H) 70 - 130 mg/dL Final   03/30/2020 1956 296 (H) 70 - 130 mg/dL Final         atorvastatin 20 mg Oral Nightly   bumetanide 2 mg Oral BID   ciprofloxacin 500 mg Oral Q12H   clopidogrel 75 mg Oral Daily   hydrALAZINE 25 mg Oral Q12H   insulin lispro 0-7 Units Subcutaneous 4x Daily  With Meals & Nightly   metoprolol tartrate 25 mg Oral Q12H   polyethylene glycol 17 g Oral Daily   sodium chloride 10 mL Intravenous Q12H       lactated ringers 9 mL/hr Last Rate: 9 mL/hr (03/28/20 1122)   Pharmacy Consult - Pharmacy to dose     Diet Regular; Consistent Carbohydrate       Assessment/Plan     Active Hospital Problems    Diagnosis  POA   • **Wound of right leg [S81.801A]  Yes   • Chronic diastolic (congestive) heart failure (CMS/[facility]) [I50.32]  Yes   • Constipation [K59.00]  Unknown   • Acute kidney injury (CMS/[facility]) [N17.9]  Yes   • Anemia [D64.9]  Yes   • S/P CABG x 2 [Z95.1]  Not Applicable   • CKD (chronic kidney disease) stage 3, GFR 30-59 ml/min (CMS/[facility]) [N18.3]  Yes   • H/O aortic valve replacement with porcine valve [Z95.3]  Not Applicable   • Bilateral carotid artery disease (CMS/[facility]) [I73.9]  Yes   • Arteriosclerosis of coronary artery [I25.10]  Yes   • Essential hypertension [I10]  Yes   • LAFB (left anterior fascicular block) [I44.4]  Yes   • Neuropathic arthropathy [M14.60]  Yes   • Type 2 diabetes mellitus with circulatory disorder, without long-term current use of insulin (CMS/[facility]) [E11.59]  Yes   • SHASTA (obstructive sleep apnea) [G47.33]  Yes   • Class 1 obesity due to excess calories without serious comorbidity with body mass index (BMI) of 30.0 to 30.9 in adult [E66.09, Z68.30]  Not Applicable      Resolved Hospital Problems   No resolved problems to display.     74 y.o. male      Assessment plan:  1. Right calf hematoma with full-thickness cutaneous necrosis.  Patient is status post drainage of right calf hematoma and he also underwent excisional debridement of skin, subcutaneous tissue and fascia on 3/28 with Dr. Pineda. Infectious disease consulted and recommending Cipro (total 7 days) for Morganella morganii and Pseudomonas on cultures. Wound vac now in place. Plans for MWF dressing changes and follow up in wound care center.  2.  Acute on chronic kidney injury stage III,  nephrology on board.  Avoid nephrotoxin medications.  Continue to monitor renal function closely. Creatinine stable, but up some today. Nephrology following. On Bumex 2 mg BID.  3.  Diabetes mellitus, continue Accu-Cheks and sliding scale insulin coverage. Sugars slightly above goal, but last A1c 7.10 on 3/27/20 which shows adequate control for age. Will not add any long-acting insulin. Plan to resume glipizide at discharge.   4.  Hyperlipidemia, continue Lipitor.  5.  Morbid obesity, complicates all aspects of care, patient was encouraged to lose weight.  6.  Constipation, resolved today. On bowel regimen.  7. SHASTA, wearing 2 L with sleep since machine not available. Monitor for signs of CO2 retention, lethargy, etc.  8. Chronic diastolic heart failure, noted on last echo in May of 2019. History of porcine AVR and CABG. Follows with Dr. Toney. He does have an extensive amount of lower extremity swelling with known history of venous insufficiency. Currently on PO Bumex per Renal. Daily weights. Additional diuresis per Nephrology. Amlodipine on hold given lower extremity swelling.  9. Anemia of CKD, received 1 dose of Venofer. Hemoglobin stable.     Vascular and ID okay with discharge. Await Nephrology input from today. I have discussed going home with home health and feel this is not safe given his weakness and need for assistance with PT. [facility] is working for rehab placement now and he is agreeable.     Discussed with nursing staff, patient and Dr. Murphy.      VTE Prophylaxis - SCDs  Code Status - Full code  Disposition - Anticipate discharge pending placement.       ZEINA Fraga  [facility]  04/01/20  14:50

## 2020-04-01 NOTE — NURSING NOTE
CWOCN follow up- changed wound vac. Tissue starting to granulate at the top edge. Tissue is beefy red. The skin below the wound is moist where the tissue was damaged prior. I added opticel over this to protect it from moisture and tearing. Patient tolerated the dressing change with a pain pill. CCP to follow up with [facility] about home machine as patient has decided on rehab.      04/01/20 1422   Wound 03/28/20 1220 Right anterior calf Incision   Date first assessed/Time first assessed: 03/28/20 1220   Side: Right  Orientation: anterior  Location: calf  Primary Wound Type: Incision   Dressing Appearance moist drainage   Base granulating;moist;red   Periwound intact   Periwound Temperature warm   Periwound Skin Turgor soft   Edges open   Drainage Characteristics/Odor serosanguineous   Drainage Amount small   Care, Wound cleansed with;sterile normal saline;negative pressure wound therapy   Dressing Care, Wound dressing changed   Periwound Care, Wound barrier film applied   NPWT (Negative Pressure Wound Therapy) 03/30/20 1530 right anterior leg   Placement Date/Time: 03/30/20 1530   Location: right anterior leg   Therapy Setting continuous therapy   Dressing foam, black;transparent dressing   Pressure Setting 125 mmHg   Sponges Inserted 2   Sponges Removed 3

## 2020-04-01 NOTE — PROGRESS NOTES
"NEPHROLOGY PROGRESS NOTE------[facility]    [facility]  [phone]  Jamil Stevens MD      Patient Care Team:  Karen Red MD as PCP - General (Family Medicine)  Azam Banegas Jr., MD as Consulting Physician (Cardiology)  Jamil Stevens MD as Consulting Physician (Nephrology)      Provider:  Jamil Stevens MD  Patient Name: Rock Maciel Jr.  :  1945    SUBJECTIVE:  F/u CKD  Patient without complaints. No SOB, CP, palpitations, cramping.  Swelling better    Medication:    atorvastatin 20 mg Oral Nightly   bumetanide 2 mg Oral BID   ciprofloxacin 500 mg Oral Q12H   clopidogrel 75 mg Oral Daily   hydrALAZINE 25 mg Oral Q12H   insulin lispro 0-7 Units Subcutaneous 4x Daily With Meals & Nightly   metoprolol tartrate 25 mg Oral Q12H   polyethylene glycol 17 g Oral Daily   sodium chloride 10 mL Intravenous Q12H       lactated ringers 9 mL/hr Last Rate: 9 mL/hr (20 1122)   Pharmacy Consult - Pharmacy to dose         OBJECTIVE    Vital Sign Min/Max for last 24 hours  Temp  Min: 97.8 °F (36.6 °C)  Max: 98.3 °F (36.8 °C)   BP  Min: 141/58  Max: 173/59   Pulse  Min: 62  Max: 70   Resp  Min: 16  Max: 16   No data recorded   No data recorded   Weight  Min: 135 kg (297 lb 12.8 oz)  Max: 135 kg (297 lb 12.8 oz)     Flowsheet Rows      First Filed Value   Admission Height  185.4 cm (73\") Documented at 2020 1606   Admission Weight  136 kg (300 lb) Documented at 2020 1633          I/O this shift:  In: 590 [P.O.:590]  Out: 1050 [Urine:1050]  I/O last 3 completed shifts:  In: 720 [P.O.:720]  Out: 4025 [Urine:4025]    Physical Exam:  General Appearance: alert, appears stated age and cooperative  Head: normocephalic, without obvious abnormality and atraumatic  Eyes: conjunctivae and sclerae normal and no icterus  Neck: supple and no JVD  Lungs: clear to auscultation and respirations regular  Heart: regular rhythm & normal rate and normal S1, S2 +PEYTON  Chest: " Wall no abnormalities observed  Abdomen: normal bowel sounds and soft non-tender +OBESITY  Extremities: moves extremities well, 1+ BILAT LE EDEMA, no cyanosis and no redness  Skin: LE WOUND WRAPPED  Neurologic: Alert, and oriented. No focal deficits    Labs:    WBC WBC   Date Value Ref Range Status   04/01/2020 4.93 3.40 - 10.80 10*3/mm3 Final   03/31/2020 4.61 3.40 - 10.80 10*3/mm3 Final   03/30/2020 5.24 3.40 - 10.80 10*3/mm3 Final      HGB Hemoglobin   Date Value Ref Range Status   04/01/2020 10.1 (L) 13.0 - 17.7 g/dL Final   03/31/2020 9.1 (L) 13.0 - 17.7 g/dL Final   03/30/2020 9.1 (L) 13.0 - 17.7 g/dL Final      HCT Hematocrit   Date Value Ref Range Status   04/01/2020 30.0 (L) 37.5 - 51.0 % Final   03/31/2020 27.3 (L) 37.5 - 51.0 % Final   03/30/2020 27.7 (L) 37.5 - 51.0 % Final      Platlets No results found for: LABPLAT   MCV MCV   Date Value Ref Range Status   04/01/2020 92.0 79.0 - 97.0 fL Final   03/31/2020 91.3 79.0 - 97.0 fL Final   03/30/2020 91.1 79.0 - 97.0 fL Final          Sodium Sodium   Date Value Ref Range Status   04/01/2020 137 136 - 145 mmol/L Final   03/31/2020 137 136 - 145 mmol/L Final   03/30/2020 138 136 - 145 mmol/L Final      Potassium Potassium   Date Value Ref Range Status   04/01/2020 3.8 3.5 - 5.2 mmol/L Final   03/31/2020 4.0 3.5 - 5.2 mmol/L Final   03/30/2020 3.9 3.5 - 5.2 mmol/L Final      Chloride Chloride   Date Value Ref Range Status   04/01/2020 95 (L) 98 - 107 mmol/L Final   03/31/2020 95 (L) 98 - 107 mmol/L Final   03/30/2020 98 98 - 107 mmol/L Final      CO2 CO2   Date Value Ref Range Status   04/01/2020 30.5 (H) 22.0 - 29.0 mmol/L Final   03/31/2020 30.9 (H) 22.0 - 29.0 mmol/L Final   03/30/2020 29.5 (H) 22.0 - 29.0 mmol/L Final      BUN BUN   Date Value Ref Range Status   04/01/2020 33 (H) 8 - 23 mg/dL Final   03/31/2020 28 (H) 8 - 23 mg/dL Final   03/30/2020 26 (H) 8 - 23 mg/dL Final      Creatinine Creatinine   Date Value Ref Range Status   04/01/2020 1.63 (H) 0.76  - 1.27 mg/dL Final   03/31/2020 1.50 (H) 0.76 - 1.27 mg/dL Final   03/30/2020 1.43 (H) 0.76 - 1.27 mg/dL Final      Calcium Calcium   Date Value Ref Range Status   04/01/2020 9.3 8.6 - 10.5 mg/dL Final   03/31/2020 8.9 8.6 - 10.5 mg/dL Final   03/30/2020 8.6 8.6 - 10.5 mg/dL Final      PO4 No components found for: PO4   Albumin Albumin   Date Value Ref Range Status   04/01/2020 2.90 (L) 3.50 - 5.20 g/dL Final   03/31/2020 2.70 (L) 3.50 - 5.20 g/dL Final      Magnesium Magnesium   Date Value Ref Range Status   03/31/2020 2.2 1.6 - 2.4 mg/dL Final   03/30/2020 2.2 1.6 - 2.4 mg/dL Final      Uric Acid No components found for: URIC ACID     Imaging Results (Last 72 Hours)     ** No results found for the last 72 hours. **          Results for orders placed during the hospital encounter of 03/26/20   XR Chest PA & Lateral    Narrative XR CHEST PA AND LATERAL-     Clinical: Congestive heart failure     COMPARISON 7/26/2018     FINDINGS: There is a ventilation of the right hemidiaphragm which is  similar to several prior examinations. Cardiac size upper limits of  normal to mildly enlarged. There are median sternotomy wires in  position. No pleural effusion is demonstrated. No gross pulmonary edema  demonstrated. No acute consolidation. Subtle diffuse increase in the  interstitial pattern suggests early or mild vascular congestion.  Mediastinum and yazmin are stable. The remainder is unremarkable.     CONCLUSION: Suggestion of perhaps early or mild vascular congestion, no  effusion or gross pulmonary edema.     This report was finalized on 3/27/2020 6:20 AM by Dr. Marcus Lentz M.D.      XR Tibia Fibula 2 View Right    Narrative XR TIBIA FIBULA 2 VW RIGHT-     Clinical: Rule out osteomyelitis     COMPARISON 3/15/2020     FINDINGS: Right knee and ankle joint degeneration. Vascular arterial  calcifications within the soft tissues. No cortical bone destruction or  periosteal abnormality has developed suggestive of  underlying  osteomyelitis. No fracture. No soft tissue gas nor radiopaque foreign  body seen.     CONCLUSION:  1. No cortical bone destruction or periosteal abnormality to suggest  underlying osteomyelitis.  2. No soft tissue gas to radiopaque foreign body seen.  3. Right knee and ankle joint degeneration.     This report was finalized on 3/26/2020 5:38 PM by Dr. Marcus Lentz M.D.          Results for orders placed during the hospital encounter of 03/26/20   Duplex Venous Lower Extremity - Bilateral CAR    Narrative · Normal bilateral lower extremity venous duplex scan.            ASSESSMENT / PLAN      Wound of right leg    Arteriosclerosis of coronary artery    Essential hypertension    LAFB (left anterior fascicular block)    Neuropathic arthropathy    Type 2 diabetes mellitus with circulatory disorder, without long-term current use of insulin (CMS/[facility])    SHASTA (obstructive sleep apnea)    Class 1 obesity due to excess calories without serious comorbidity with body mass index (BMI) of 30.0 to 30.9 in adult    Bilateral carotid artery disease (CMS/[facility])    H/O aortic valve replacement with porcine valve    S/P CABG x 2    CKD (chronic kidney disease) stage 3, GFR 30-59 ml/min (CMS/[facility])    Acute kidney injury (CMS/[facility])    Anemia    Chronic diastolic (congestive) heart failure (CMS/[facility])    Constipation    1. KILLIAN/CKD STG 3------KILLIAN on top of known CKD STG 3, with a baseline serum creatinine of about 1.4-1.5. CRF/CKD STG 3 secondary to DGS/HTN NS. KILLIAN is secondary to some prerenal/hemodynamic fluctuation from decompensated CHF (Cardiorenal). Cautiously diurese and follow. Consider d/c Vancomycin once C and S from wound get back b/c of risk of ATN from combo of Zosyn/Vanc. Backed down Zosyn.   No NSAIDs or IV dye. Creatinine already better today. Dose meds for CrCl 30 cc/min     2. HTN WITH CKD STG 3-----Avoid hypotension. D/C Amlodipine b/c of LE edema (start po Hydralzine in it's place). Change Atenolol to BID  Metoprolol given CKD. No ACE/ARB/DRI for now     3. VOLUM EXCESS/EDEMA------ D/C Amlodipine. Some component of chronic venous insufficiency. TSH okay     4. H/O LEFT COMPLEX RENAL CYST------Renal US okay now     5. ANEMIA OF CKD-----Venofer for KEE. Follow for EPO need     6. OA/DJD------No NSAIDs.       7. OBESITY/SHASTA     8. CAD WITH H/O CABG/AVR------No angina at present     9. LE WOUND     10. DMII WITH RENAL MANIFESTATIONS-------Glipizide, Glucometers, SSI     11. VITAMIN D DEFIICENCY     12. HYPERLIPIDEMIA------CK normal. Restarted low dose Statin    13. HYPOALBUMINEMIA------Follow for IV Albumin need    Cr stable  Diuresing well  Continue bumex but decrease to once daily  Increase hydralazine to 50 mg TID  Awaits placement    Jamil Stevens MD  [facility]  [phone]  04/01/20  15:26

## 2020-04-01 NOTE — PROGRESS NOTES
Continued Stay Note  [facility]     Patient Name: Rock Maciel Jr.  MRN: [id number]  Today's Date: 4/1/2020    Admit Date: 3/26/2020    Discharge Plan     Row Name 04/01/20 1435       Plan    Plan  SNF    Patient/Family in Agreement with Plan  yes    Plan Comments  Called in to patient room. [place] has no beds. Patient requested referrals to [facility] (1st) and [place] (2nd). Referrals placed in [facility]. Patient will need precert. Notified I patient going to rehab instead of going home. CCP to follow. Vera Pinto RN    Row Name 04/01/20 1210       Plan    Plan  [facility]    Provided Post Acute Provider List?  Yes    Post Acute Provider List  Nursing Home    Provided Post Acute Provider Quality & Resource List?  Yes    Post Acute Provider Quality and Resource List  Nursing Home    Delivered To  Patient    Method of Delivery  Telephone    Patient/Family in Agreement with Plan  yes    Plan Comments  Called in to patient room. Discussed MD and APRN concerns about patient going home alone. Patient has been to [facility] in the past and requested referral be sent there. Also review other facilities but only wants [facility] referral. Placed referral in [facility] and emailed [facility]. CCP to follow. Vera Pinto RN        Discharge Codes    No documentation.             Vera Pinto RN

## 2020-04-01 NOTE — THERAPY TREATMENT NOTE
Patient Name: Rock Maciel Jr.  : 1945    MRN: [id number]                              Today's Date: 2020       Admit Date: 3/26/2020    Visit Dx:     ICD-10-CM ICD-9-CM   1. Acute kidney injury (CMS/HCC) N17.9 584.9   2. Anemia, unspecified type D64.9 285.9   3. Hematoma T14.8XXA 924.9     Patient Active Problem List   Diagnosis   • Abnormal ECG   • Arteriosclerosis of coronary artery   • Cardiac murmur   • Essential hypertension   • LAFB (left anterior fascicular block)   • Bundle branch block, right   • Neuropathic arthropathy   • Type 2 diabetes mellitus with circulatory disorder, without long-term current use of insulin (CMS/HCC)   • SHASTA (obstructive sleep apnea)   • Gain of weight   • Gout   • Class 1 obesity due to excess calories without serious comorbidity with body mass index (BMI) of 30.0 to 30.9 in adult   • 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 (CMS/[facility])   • Chronic venous insufficiency   • Nonrheumatic aortic valve stenosis   • Carotid stenosis, asymptomatic   • Bradycardia   • Hyperlipidemia   • Bilateral carotid artery disease (CMS/HCC)   • Abnormal cardiovascular stress test   • H/O aortic valve replacement with porcine valve   • Charcot-Davida-Tooth disease-like deformity of foot   • Amputated toe of right foot (CMS/HCC)   • Fall   • Non-traumatic rhabdomyolysis   • S/P CABG x 2   • CKD (chronic kidney disease) stage 3, GFR 30-59 ml/min (CMS/HCC)   • Rupture of left quadriceps tendon   • Constipation   • Acute kidney injury (CMS/HCC)   • Wound of right leg   • Anemia   • Chronic diastolic (congestive) heart failure (CMS/[facility])   • Constipation     Past Medical History:   Diagnosis Date   • Allergic rhinitis    • Bronchitis    • Coronary artery disease    • Diabetes mellitus (CMS/HCC)    • DM (diabetes mellitus) (CMS/[facility])    • Encounter for annual health examination 2014    Annual Health Assessment   • Gout    • Hiatal hernia    • History of MRSA infection     RIGHT  FOOT 2010   • Hyperlipidemia    • Hypertension    • Murmur    • Pneumothorax on right    • Sleep apnea     pt wears CPAP at night   • Wellness examination 06/24/2015    Annual Wellness Visit     Past Surgical History:   Procedure Laterality Date   • ARTERY SURGERY Bilateral     carotid   • CARDIAC CATHETERIZATION N/A 7/20/2018    Procedure: Left Heart Cath;  Surgeon: Jo Toney MD;  Location: [facility] CATH INVASIVE LOCATION;  Service: Cardiovascular   • CARDIAC CATHETERIZATION N/A 7/20/2018    Procedure: Coronary angiography;  Surgeon: Jo Toney MD;  [facility];  Service: Cardiovascular   • CAROTID ENDARTERECTOMY Bilateral    • COLONOSCOPY  2008   • CORONARY ARTERY BYPASS GRAFT WITH AORTIC VALVE REPAIR/REPLACEMENT N/A 7/23/2018    Procedure: INTRAOPERATIVE ALEX, MIDLINE STERNOTOMY, CORONARY ARTERY BYPASS GRAFTING X 3 USING ENDOSCOPICALLY HARVESTED LEFT GREATER SAPHENOUS VEIN,  AORTIC VALVE REPLACEMENT USING 25MM LOPEZ II ULTRA PORCINE VALVE, PRP;  Surgeon: Phong Posey MD;  Location: [facility] OR;  Service: Cardiothoracic   • FOOT SURGERY Right 2010    5th digit removal   • FOOT SURGERY Left 2011    1 digit removed   • INCISION AND DRAINAGE LEG Right 3/28/2020    Procedure: DEBRIDMENT OF RIGHT CALF;  Surgeon: Ross Pineda MD;  Location: [facility] OR;  Service: Vascular;  Laterality: Right;   • QUADRICEPS TENDON REPAIR Left 5/14/2019    Procedure: Left QUADRICEPS TENDON REPAIR;  Surgeon: Camilo Hunter MD;  Location: [facility] OR;  Service: Orthopedics   • THORACENTESIS Right 11/21/2016   • THORACOSCOPY Right 5/8/2017    Procedure: BRONCHOSCOPY, RIGHT VAT,  TOTAL DECORTICATION RIGHT LUNG, PLEURAL BX, PLACEMENT SUBPLEURAL PAIN CAATHETERS X2;  Surgeon: Donald Orlando III, MD;  Location: [facility] OR;  Service:    • TONSILECTOMY, ADENOIDECTOMY, BILATERAL MYRINGOTOMY AND TUBES       General Information     Row Name 04/01/20 1040          PT  Evaluation Time/Intention    Document Type  therapy note (daily note)  -PH     Mode of Treatment  physical therapy  -PH     Row Name 04/01/20 1040          Safety Issues, Functional Mobility    Impairments Affecting Function (Mobility)  endurance/activity tolerance;strength;balance  -PH       User Key  (r) = Recorded By, (t) = Taken By, (c) = Cosigned By    Initials Name Provider Type    PH Lucy Javier, PTA Physical Therapy Assistant        Mobility     Row Name 04/01/20 1040          Bed Mobility Assessment/Treatment    Rolling Left [place] (Bed Mobility)  supervision;verbal cues  -PH     Sidelying-Sit [place] (Bed Mobility)  contact guard;verbal cues  -PH     Assistive Device (Bed Mobility)  bed rails;head of bed elevated  -PH     Comment (Bed Mobility)  pt req extra time  -PH     Row Name 04/01/20 1040          Transfer Assessment/Treatment    Comment (Transfers)  STS x 4; from bed x 3, chair x 1; vc for forward wt shift; pt initially mod A for trial 1, min A for trial 2, CGA for trial 3-4; education for set up and safety to STS  -PH     Row Name 04/01/20 1040          Sit-Stand Transfer    Sit-Stand [place] (Transfers)  minimum assist (75% patient effort);contact guard;moderate assist (50% patient effort);verbal cues;nonverbal cues (demo/gesture);set up  -PH     Assistive Device (Sit-Stand Transfers)  walker, front-wheeled  -PH     Row Name 04/01/20 1040          Gait/Stairs Assessment/Training    Gait/Stairs Assessment/Training  gait/ambulation independence  -PH     [place] Level (Gait)  contact guard  -PH     Distance in Feet (Gait)  12' from bed around to chair w/ 3 min seated rest then 12' back to bed  -PH     Pattern (Gait)  step-to  -PH     Deviations/Abnormal Patterns (Gait)  bilateral deviations;gait speed decreased;crispin decreased;stride length decreased  -PH     Bilateral Gait Deviations  forward flexed posture  -PH     Comment (Gait/Stairs)  B foot deformites   -PH        User Key  (r) = Recorded By, (t) = Taken By, (c) = Cosigned By    Initials Name Provider Type    PH Lucy Javier PTA Physical Therapy Assistant        Obj/Interventions     Row Name 04/01/20 1044          Static Standing Balance    Level of [place] (Supported Standing, Static Balance)  supervision  -PH     Time Able to Maintain Position (Supported Standing, Static Balance)  2 to 3 minutes;45 to 60 seconds  -PH     Comment (Supported Standing, Static Balance)  x 4  -PH       User Key  (r) = Recorded By, (t) = Taken By, (c) = Cosigned By    Initials Name Provider Type    PH Lucy Javier PTA Physical Therapy Assistant        Goals/Plan    No documentation.       Clinical Impression     Row Name 04/01/20 1045          Pain Assessment    Additional Documentation  Pain Scale: Numbers Pre/Post-Treatment (Group)  -PH     Row Name 04/01/20 1045          Pain Scale: Numbers Pre/Post-Treatment    Pain Scale: Numbers, Pretreatment  3/10  -PH     Pain Location - Side  Right  -PH     Pain Location  ankle  -PH     Pain Intervention(s)  Repositioned;Rest;Elevated  -PH     Row Name 04/01/20 1045          Plan of Care Review    Plan of Care Reviewed With  patient  -PH     Outcome Summary  Pt receiving education and practicing STS. Pt performed STS x 4 initially req mod A and use of fww, but able to perform w/ CGA on 3rd and 4th attempts from bed. Pt amb 12' to chair w/ 3 min rest then 12' back to EOB and req CGA/fww. Pt req sup/CGA for s>s and min A for s<s. PT would recommend SNF 2/2 to pt's level of weakness and limited assistance at home.  Discussed w/ pt the benefits of SNF although pt has stated he prefers home w/ HH.  -PH     Row Name 04/01/20 1045          Vital Signs    O2 Delivery Pre Treatment  room air  -PH     O2 Delivery Intra Treatment  room air  -PH     O2 Delivery Post Treatment  room air  -PH     Row Name 04/01/20 1045          Positioning and Restraints    Pre-Treatment Position  in bed   -PH     Post Treatment Position  bed  -PH     In Bed  fowlers;call light within reach;encouraged to call for assist;exit alarm on  -PH       User Key  (r) = Recorded By, (t) = Taken By, (c) = Cosigned By    Initials Name Provider Type    Lucy Chapman PTA Physical Therapy Assistant        Outcome Measures     Row Name 04/01/20 1052          How much help from another person do you currently need...    Turning from your back to your side while in flat bed without using bedrails?  4  -PH     Moving from lying on back to sitting on the side of a flat bed without bedrails?  4  -PH     Moving to and from a bed to a chair (including a wheelchair)?  3  -PH     Standing up from a chair using your arms (e.g., wheelchair, bedside chair)?  3  -PH     Climbing 3-5 steps with a railing?  2  -PH     To walk in hospital room?  3  -PH     AM-PAC 6 Clicks Score (PT)  19  -PH     Row Name 04/01/20 1052          Functional Assessment    Outcome Measure Options  AM-PAC 6 Clicks Basic Mobility (PT)  -PH       User Key  (r) = Recorded By, (t) = Taken By, (c) = Cosigned By    Initials Name Provider Type    Lucy Chapman PTA Physical Therapy Assistant          PT Recommendation and Plan     Outcome Summary/Treatment Plan (PT)  Anticipated Equipment Needs at Discharge (PT): other (see comments)(bed rail)  Anticipated Discharge Disposition (PT): skilled nursing facility, home with home health  Plan of Care Reviewed With: patient  Progress: improving  Outcome Summary: Pt receiving education and practicing STS. Pt performed STS x 4 initially req mod A and use of fww, but able to perform w/ CGA on 3rd and 4th attempts from bed. Pt amb 12' to chair w/ 3 min rest then 12' back to EOB and req CGA/fww. Pt req sup/CGA for s>s and min A for s<s. PT would recommend SNF 2/2 to pt's level of weakness and limited assistance at home.  Discussed w/ pt the benefits of SNF although pt has stated he prefers home w/ HH.     Time  Calculation:   PT Charges     Row Name 04/01/20 1053             Time Calculation    Start Time  1013  -PH      Stop Time  1037  -PH      Time Calculation (min)  24 min  -PH      PT Received On  04/01/20  -PH      PT - Next Appointment  04/02/20  -PH        User Key  (r) = Recorded By, (t) = Taken By, (c) = Cosigned By    Initials Name Provider Type    PH HuckendublerLucy, PTA Physical Therapy Assistant        Therapy Charges for Today     Code Description Service Date Service Provider Modifiers Qty    48447538710 HC PT THER PROC EA 15 MIN 3/31/2020 Huckendubler, Lucy, PTA GP 2    67501035609 HC PT THER SUPP EA 15 MIN 3/31/2020 Huckendubler, Lucy, PTA GP 1    90023568303 HC PT THER PROC EA 15 MIN 4/1/2020 Huckendubler, Lucy, PTA GP 2          PT G-Codes  Outcome Measure Options: AM-PAC 6 Clicks Basic Mobility (PT)  AM-PAC 6 Clicks Score (PT): 19  AM-PAC 6 Clicks Score (OT): 14    Lucy Javier, PTA  4/1/2020

## 2020-04-01 NOTE — PLAN OF CARE
Problem: Patient Care Overview  Goal: Plan of Care Review  Outcome: Ongoing (interventions implemented as appropriate)  Flowsheets  Taken 4/1/2020 1052  Progress: improving  Taken 4/1/2020 1045  Plan of Care Reviewed With: patient  Outcome Summary: Pt receiving education and practicing STS. Pt performed STS x 4 initially req mod A and use of fww, but able to perform w/ CGA on 3rd and 4th attempts from bed. Pt amb 12' to chair w/ 3 min rest then 12' back to EOB and req CGA/fww. Pt req sup/CGA for s>s and min A for s<s. PT would recommend SNF 2/2 to pt's level of weakness and limited assistance at home.  Discussed w/ pt the merits of SNF although pt has stated he prefers home w/ HH.

## 2020-04-01 NOTE — PROGRESS NOTES
Continued Stay Note  [facility]     Patient Name: Rock Maciel Jr.  MRN: [id number]  Today's Date: 4/1/2020    Admit Date: 3/26/2020    Discharge Plan     Row Name 04/01/20 1210       Plan    Plan  [facility]    Provided Post Acute Provider List?  Yes    Post Acute Provider List  Nursing Home    Provided Post Acute Provider Quality & Resource List?  Yes    Post Acute Provider Quality and Resource List  Nursing Home    Delivered To  Patient    Method of Delivery  Telephone    Patient/Family in Agreement with Plan  yes    Plan Comments  Called in to patient room. Discussed MD and APRN concerns about patient going home alone. Patient has been to [facility] in the past and requested referral be sent there. Also review other facilities but only wants [facility] referral. Placed referral in [email or web address] and emailed [facility]. [facility] to follow. Vera Pinto RN        Discharge Codes    No documentation.             Vera Pinto RN

## 2020-04-02 LAB
ALBUMIN SERPL-MCNC: 3 G/DL (ref 3.5–5.2)
ANION GAP SERPL CALCULATED.3IONS-SCNC: 11.9 MMOL/L (ref 5–15)
ANISOCYTOSIS BLD QL: ABNORMAL
BACTERIA SPEC ANAEROBE CULT: NORMAL
BUN BLD-MCNC: 33 MG/DL (ref 8–23)
BUN/CREAT SERPL: 20.2 (ref 7–25)
CALCIUM SPEC-SCNC: 8.8 MG/DL (ref 8.6–10.5)
CHLORIDE SERPL-SCNC: 97 MMOL/L (ref 98–107)
CO2 SERPL-SCNC: 30.1 MMOL/L (ref 22–29)
CREAT BLD-MCNC: 1.63 MG/DL (ref 0.76–1.27)
DEPRECATED RDW RBC AUTO: 44.9 FL (ref 37–54)
EOSINOPHIL # BLD MANUAL: 0.1 10*3/MM3 (ref 0–0.4)
EOSINOPHIL NFR BLD MANUAL: 2 % (ref 0.3–6.2)
ERYTHROCYTE [DISTWIDTH] IN BLOOD BY AUTOMATED COUNT: 13.4 % (ref 12.3–15.4)
GFR SERPL CREATININE-BSD FRML MDRD: 41 ML/MIN/1.73
GLUCOSE BLD-MCNC: 193 MG/DL (ref 65–99)
GLUCOSE BLDC GLUCOMTR-MCNC: 195 MG/DL (ref 70–130)
GLUCOSE BLDC GLUCOMTR-MCNC: 245 MG/DL (ref 70–130)
GLUCOSE BLDC GLUCOMTR-MCNC: 260 MG/DL (ref 70–130)
GLUCOSE BLDC GLUCOMTR-MCNC: 309 MG/DL (ref 70–130)
HCT VFR BLD AUTO: 28.5 % (ref 37.5–51)
HGB BLD-MCNC: 9.4 G/DL (ref 13–17.7)
LYMPHOCYTES # BLD MANUAL: 0.91 10*3/MM3 (ref 0.7–3.1)
LYMPHOCYTES NFR BLD MANUAL: 18.2 % (ref 19.6–45.3)
LYMPHOCYTES NFR BLD MANUAL: 2 % (ref 5–12)
MCH RBC QN AUTO: 30.3 PG (ref 26.6–33)
MCHC RBC AUTO-ENTMCNC: 33 G/DL (ref 31.5–35.7)
MCV RBC AUTO: 91.9 FL (ref 79–97)
MONOCYTES # BLD AUTO: 0.1 10*3/MM3 (ref 0.1–0.9)
NEUTROPHILS # BLD AUTO: 3.89 10*3/MM3 (ref 1.7–7)
NEUTROPHILS NFR BLD MANUAL: 77.8 % (ref 42.7–76)
PHOSPHATE SERPL-MCNC: 3.9 MG/DL (ref 2.5–4.5)
PLAT MORPH BLD: NORMAL
PLATELET # BLD AUTO: 238 10*3/MM3 (ref 140–450)
PMV BLD AUTO: 9.4 FL (ref 6–12)
POLYCHROMASIA BLD QL SMEAR: ABNORMAL
POTASSIUM BLD-SCNC: 3.9 MMOL/L (ref 3.5–5.2)
RBC # BLD AUTO: 3.1 10*6/MM3 (ref 4.14–5.8)
SODIUM BLD-SCNC: 139 MMOL/L (ref 136–145)
WBC MORPH BLD: NORMAL
WBC NRBC COR # BLD: 5 10*3/MM3 (ref 3.4–10.8)

## 2020-04-02 PROCEDURE — 63710000001 INSULIN LISPRO (HUMAN) PER 5 UNITS: Performed by: SURGERY

## 2020-04-02 PROCEDURE — 85007 BL SMEAR W/DIFF WBC COUNT: CPT | Performed by: INTERNAL MEDICINE

## 2020-04-02 PROCEDURE — 82962 GLUCOSE BLOOD TEST: CPT

## 2020-04-02 PROCEDURE — 99232 SBSQ HOSP IP/OBS MODERATE 35: CPT | Performed by: INTERNAL MEDICINE

## 2020-04-02 PROCEDURE — 97110 THERAPEUTIC EXERCISES: CPT

## 2020-04-02 PROCEDURE — 85025 COMPLETE CBC W/AUTO DIFF WBC: CPT | Performed by: INTERNAL MEDICINE

## 2020-04-02 PROCEDURE — 25010000002 IRON SUCROSE PER 1 MG: Performed by: HOSPITALIST

## 2020-04-02 PROCEDURE — 80069 RENAL FUNCTION PANEL: CPT | Performed by: INTERNAL MEDICINE

## 2020-04-02 RX ORDER — GLIPIZIDE 5 MG/1
5 TABLET ORAL
Status: DISCONTINUED | OUTPATIENT
Start: 2020-04-02 | End: 2020-04-03 | Stop reason: HOSPADM

## 2020-04-02 RX ORDER — ASCORBIC ACID 500 MG
250 TABLET ORAL DAILY
Status: DISCONTINUED | OUTPATIENT
Start: 2020-04-02 | End: 2020-04-03 | Stop reason: HOSPADM

## 2020-04-02 RX ORDER — MELATONIN
2000 DAILY
Status: DISCONTINUED | OUTPATIENT
Start: 2020-04-02 | End: 2020-04-03 | Stop reason: HOSPADM

## 2020-04-02 RX ADMIN — BUMETANIDE 2 MG: 2 TABLET ORAL at 08:21

## 2020-04-02 RX ADMIN — METOPROLOL TARTRATE 25 MG: 25 TABLET ORAL at 08:21

## 2020-04-02 RX ADMIN — HYDRALAZINE HYDROCHLORIDE 50 MG: 50 TABLET, FILM COATED ORAL at 16:56

## 2020-04-02 RX ADMIN — INSULIN LISPRO 2 UNITS: 100 INJECTION, SOLUTION INTRAVENOUS; SUBCUTANEOUS at 08:21

## 2020-04-02 RX ADMIN — HYDRALAZINE HYDROCHLORIDE 50 MG: 50 TABLET, FILM COATED ORAL at 21:09

## 2020-04-02 RX ADMIN — VITAMIN D, TAB 1000IU (100/BT) 2000 UNITS: 25 TAB at 16:56

## 2020-04-02 RX ADMIN — OXYCODONE HYDROCHLORIDE AND ACETAMINOPHEN 250 MG: 500 TABLET ORAL at 16:56

## 2020-04-02 RX ADMIN — CIPROFLOXACIN HYDROCHLORIDE 500 MG: 500 TABLET, FILM COATED ORAL at 21:09

## 2020-04-02 RX ADMIN — CIPROFLOXACIN HYDROCHLORIDE 500 MG: 500 TABLET, FILM COATED ORAL at 08:21

## 2020-04-02 RX ADMIN — IRON SUCROSE 200 MG: 20 INJECTION, SOLUTION INTRAVENOUS at 11:54

## 2020-04-02 RX ADMIN — METOPROLOL TARTRATE 25 MG: 25 TABLET ORAL at 21:09

## 2020-04-02 RX ADMIN — ATORVASTATIN CALCIUM 20 MG: 20 TABLET, FILM COATED ORAL at 21:09

## 2020-04-02 RX ADMIN — GLIPIZIDE 5 MG: 5 TABLET ORAL at 16:56

## 2020-04-02 RX ADMIN — INSULIN LISPRO 5 UNITS: 100 INJECTION, SOLUTION INTRAVENOUS; SUBCUTANEOUS at 12:16

## 2020-04-02 RX ADMIN — CLOPIDOGREL 75 MG: 75 TABLET, FILM COATED ORAL at 08:21

## 2020-04-02 RX ADMIN — INSULIN LISPRO 4 UNITS: 100 INJECTION, SOLUTION INTRAVENOUS; SUBCUTANEOUS at 21:09

## 2020-04-02 RX ADMIN — HYDRALAZINE HYDROCHLORIDE 50 MG: 50 TABLET, FILM COATED ORAL at 06:33

## 2020-04-02 RX ADMIN — SODIUM CHLORIDE, PRESERVATIVE FREE 10 ML: 5 INJECTION INTRAVENOUS at 08:21

## 2020-04-02 RX ADMIN — INSULIN LISPRO 3 UNITS: 100 INJECTION, SOLUTION INTRAVENOUS; SUBCUTANEOUS at 17:35

## 2020-04-02 RX ADMIN — SODIUM CHLORIDE, PRESERVATIVE FREE 10 ML: 5 INJECTION INTRAVENOUS at 21:09

## 2020-04-02 NOTE — PLAN OF CARE
Problem: Patient Care Overview  Goal: Plan of Care Review  Outcome: Ongoing (interventions implemented as appropriate)  Flowsheets (Taken 4/2/2020 5543)  Progress: improving  Plan of Care Reviewed With: patient  Outcome Summary: VSS; Q2H turn; SR w/BBB & 1st deg AV block & ST elevation on monitor; wound vac in place & draining; pt agreeable to SNF; will cont to monitor      No significant past surgical history

## 2020-04-02 NOTE — PROGRESS NOTES
"NEPHROLOGY PROGRESS NOTE------[facility]    [facility]  [phone]  Jamil Stevens MD      Patient Care Team:  Karen Red MD as PCP - General (Family Medicine)  Azam Banegas Jr., MD as Consulting Physician (Cardiology)  Jamil Stevens MD as Consulting Physician (Nephrology)      Provider:  Jamil Stevens MD  Patient Name: Rock Maciel Jr.  :  1945    SUBJECTIVE:  F/u CKD  Patient without complaints. No SOB, CP, palpitations, cramping.  Swelling better    Medication:    atorvastatin 20 mg Oral Nightly   bumetanide 2 mg Oral Daily   ciprofloxacin 500 mg Oral Q12H   clopidogrel 75 mg Oral Daily   hydrALAZINE 50 mg Oral Q8H   insulin lispro 0-7 Units Subcutaneous 4x Daily With Meals & Nightly   metoprolol tartrate 25 mg Oral Q12H   polyethylene glycol 17 g Oral Daily   sodium chloride 10 mL Intravenous Q12H       lactated ringers 9 mL/hr Last Rate: 9 mL/hr (20 1122)   Pharmacy Consult - Pharmacy to dose         OBJECTIVE    Vital Sign Min/Max for last 24 hours  Temp  Min: 97.6 °F (36.4 °C)  Max: 98.9 °F (37.2 °C)   BP  Min: 133/54  Max: 178/65   Pulse  Min: 61  Max: 71   Resp  Min: 16  Max: 18   SpO2  Min: 93 %  Max: 96 %   No data recorded   Weight  Min: 135 kg (297 lb 6.4 oz)  Max: 135 kg (297 lb 6.4 oz)     Flowsheet Rows      First Filed Value   Admission Height  185.4 cm (73\") Documented at 2020 1606   Admission Weight  136 kg (300 lb) Documented at 2020 1633          No intake/output data recorded.  I/O last 3 completed shifts:  In: 1110 [P.O.:1110]  Out: 3125 [Urine:3125]    Physical Exam:  General Appearance: alert, appears stated age and cooperative  Head: normocephalic, without obvious abnormality and atraumatic  Eyes: conjunctivae and sclerae normal and no icterus  Neck: supple and no JVD  Lungs: clear to auscultation and respirations regular  Heart: regular rhythm & normal rate and normal S1, S2 +PEYTON  Chest: Wall no " abnormalities observed  Abdomen: normal bowel sounds and soft non-tender +OBESITY  Extremities: moves extremities well, 1+ BILAT LE EDEMA, no cyanosis and no redness  Skin: LE WOUND WRAPPED  Neurologic: Alert, and oriented. No focal deficits    Labs:    WBC WBC   Date Value Ref Range Status   04/02/2020 5.00 3.40 - 10.80 10*3/mm3 Final   04/01/2020 4.93 3.40 - 10.80 10*3/mm3 Final   03/31/2020 4.61 3.40 - 10.80 10*3/mm3 Final      HGB Hemoglobin   Date Value Ref Range Status   04/02/2020 9.4 (L) 13.0 - 17.7 g/dL Final   04/01/2020 10.1 (L) 13.0 - 17.7 g/dL Final   03/31/2020 9.1 (L) 13.0 - 17.7 g/dL Final      HCT Hematocrit   Date Value Ref Range Status   04/02/2020 28.5 (L) 37.5 - 51.0 % Final   04/01/2020 30.0 (L) 37.5 - 51.0 % Final   03/31/2020 27.3 (L) 37.5 - 51.0 % Final      Platlets No results found for: LABPLAT   MCV MCV   Date Value Ref Range Status   04/02/2020 91.9 79.0 - 97.0 fL Final   04/01/2020 92.0 79.0 - 97.0 fL Final   03/31/2020 91.3 79.0 - 97.0 fL Final          Sodium Sodium   Date Value Ref Range Status   04/02/2020 139 136 - 145 mmol/L Final   04/01/2020 137 136 - 145 mmol/L Final   03/31/2020 137 136 - 145 mmol/L Final      Potassium Potassium   Date Value Ref Range Status   04/02/2020 3.9 3.5 - 5.2 mmol/L Final   04/01/2020 3.8 3.5 - 5.2 mmol/L Final   03/31/2020 4.0 3.5 - 5.2 mmol/L Final      Chloride Chloride   Date Value Ref Range Status   04/02/2020 97 (L) 98 - 107 mmol/L Final   04/01/2020 95 (L) 98 - 107 mmol/L Final   03/31/2020 95 (L) 98 - 107 mmol/L Final      CO2 CO2   Date Value Ref Range Status   04/02/2020 30.1 (H) 22.0 - 29.0 mmol/L Final   04/01/2020 30.5 (H) 22.0 - 29.0 mmol/L Final   03/31/2020 30.9 (H) 22.0 - 29.0 mmol/L Final      BUN BUN   Date Value Ref Range Status   04/02/2020 33 (H) 8 - 23 mg/dL Final   04/01/2020 33 (H) 8 - 23 mg/dL Final   03/31/2020 28 (H) 8 - 23 mg/dL Final      Creatinine Creatinine   Date Value Ref Range Status   04/02/2020 1.63 (H) 0.76 -  1.27 mg/dL Final   04/01/2020 1.63 (H) 0.76 - 1.27 mg/dL Final   03/31/2020 1.50 (H) 0.76 - 1.27 mg/dL Final      Calcium Calcium   Date Value Ref Range Status   04/02/2020 8.8 8.6 - 10.5 mg/dL Final   04/01/2020 9.3 8.6 - 10.5 mg/dL Final   03/31/2020 8.9 8.6 - 10.5 mg/dL Final      PO4 No components found for: PO4   Albumin Albumin   Date Value Ref Range Status   04/02/2020 3.00 (L) 3.50 - 5.20 g/dL Final   04/01/2020 2.90 (L) 3.50 - 5.20 g/dL Final   03/31/2020 2.70 (L) 3.50 - 5.20 g/dL Final      Magnesium Magnesium   Date Value Ref Range Status   03/31/2020 2.2 1.6 - 2.4 mg/dL Final      Uric Acid No components found for: URIC ACID     Imaging Results (Last 72 Hours)     ** No results found for the last 72 hours. **          Results for orders placed during the hospital encounter of 03/26/20   XR Chest PA & Lateral    Narrative XR CHEST PA AND LATERAL-     Clinical: Congestive heart failure     COMPARISON 7/26/2018     FINDINGS: There is a ventilation of the right hemidiaphragm which is  similar to several prior examinations. Cardiac size upper limits of  normal to mildly enlarged. There are median sternotomy wires in  position. No pleural effusion is demonstrated. No gross pulmonary edema  demonstrated. No acute consolidation. Subtle diffuse increase in the  interstitial pattern suggests early or mild vascular congestion.  Mediastinum and yazmin are stable. The remainder is unremarkable.     CONCLUSION: Suggestion of perhaps early or mild vascular congestion, no  effusion or gross pulmonary edema.     This report was finalized on 3/27/2020 6:20 AM by Dr. Marcus Lentz M.D.      XR Tibia Fibula 2 View Right    Narrative XR TIBIA FIBULA 2 VW RIGHT-     Clinical: Rule out osteomyelitis     COMPARISON 3/15/2020     FINDINGS: Right knee and ankle joint degeneration. Vascular arterial  calcifications within the soft tissues. No cortical bone destruction or  periosteal abnormality has developed suggestive of  underlying  osteomyelitis. No fracture. No soft tissue gas nor radiopaque foreign  body seen.     CONCLUSION:  1. No cortical bone destruction or periosteal abnormality to suggest  underlying osteomyelitis.  2. No soft tissue gas to radiopaque foreign body seen.  3. Right knee and ankle joint degeneration.     This report was finalized on 3/26/2020 5:38 PM by Dr. Marcus Lentz M.D.          Results for orders placed during the hospital encounter of 03/26/20   Duplex Venous Lower Extremity - Bilateral CAR    Narrative · Normal bilateral lower extremity venous duplex scan.            ASSESSMENT / PLAN      Wound of right leg    Arteriosclerosis of coronary artery    Essential hypertension    LAFB (left anterior fascicular block)    Neuropathic arthropathy    Type 2 diabetes mellitus with circulatory disorder, without long-term current use of insulin (CMS/[facility])    SHASTA (obstructive sleep apnea)    Class 1 obesity due to excess calories without serious comorbidity with body mass index (BMI) of 30.0 to 30.9 in adult    Bilateral carotid artery disease (CMS/[facility])    H/O aortic valve replacement with porcine valve    S/P CABG x 2    CKD (chronic kidney disease) stage 3, GFR 30-59 ml/min (CMS/[facility])    Acute kidney injury (CMS/[facility])    Anemia    Chronic diastolic (congestive) heart failure (CMS/[facility])    Constipation    1. KILLIAN/CKD STG 3------KILLIAN on top of known CKD STG 3, with a baseline serum creatinine of about 1.4-1.5. CRF/CKD STG 3 secondary to DGS/HTN NS. KILLIAN is secondary to some prerenal/hemodynamic fluctuation from decompensated CHF (Cardiorenal). Cautiously diurese and follow. Consider d/c Vancomycin once C and S from wound get back b/c of risk of ATN from combo of Zosyn/Vanc. Backed down Zosyn.   No NSAIDs or IV dye. Creatinine already better today. Dose meds for CrCl 30 cc/min     2. HTN WITH CKD STG 3-----Avoid hypotension. D/C Amlodipine b/c of LE edema (start po Hydralzine in it's place). Change Atenolol to BID  Metoprolol given CKD. No ACE/ARB/DRI for now     3. VOLUM EXCESS/EDEMA------ D/C Amlodipine. Some component of chronic venous insufficiency. TSH okay     4. H/O LEFT COMPLEX RENAL CYST------Renal US okay now     5. ANEMIA OF CKD-----Venofer for KEE. Follow for EPO need     6. OA/DJD------No NSAIDs.       7. OBESITY/SHASTA     8. CAD WITH H/O CABG/AVR------No angina at present     9. LE WOUND     10. DMII WITH RENAL MANIFESTATIONS-------Glipizide, Glucometers, SSI     11. VITAMIN D DEFIICENCY     12. HYPERLIPIDEMIA------CK normal. Restarted low dose Statin    13. HYPOALBUMINEMIA------Follow for IV Albumin need    Cr stable, continue bumex once daily  BP ok  Awaits placement    Jamil Stevens MD  [facility]  [phone]  04/02/20  12:52

## 2020-04-02 NOTE — PLAN OF CARE
Problem: Patient Care Overview  Goal: Plan of Care Review  Outcome: Ongoing (interventions implemented as appropriate)  Flowsheets  Taken 4/2/2020 0532 by Sharita Rojo RN  Progress: improving  Taken 4/2/2020 1130 by Lucy Javier PTA  Plan of Care Reviewed With: patient  Outcome Summary: Pt improving w/ amb of 12-15' x 2 plus transfer b>c in 6-7 steps req fww and CGA. Pt was sup for s>s today given extra time. Pt also req extra time to assume upright posture when performing  STS x 3. Discussed w/ pt the merits of SNF to improve his weakness and safety when performing STS. PT will see pt again in two days to limit exposure and ask nsg staff to continue to amb pt at reg intervals. Pt would benefit from SNF to improve his strength, mobility, and endurance as well as improve his overall safety as he has limited assistance living home alone.
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
## 2020-04-02 NOTE — THERAPY TREATMENT NOTE
Patient Name: Rock Maciel Jr.  : 1945    MRN: [id number]                              Today's Date: 2020       Admit Date: 3/26/2020    Visit Dx:     ICD-10-CM ICD-9-CM   1. Acute kidney injury (CMS/HCC) N17.9 584.9   2. Anemia, unspecified type D64.9 285.9   3. Hematoma T14.8XXA 924.9     Patient Active Problem List   Diagnosis   • Abnormal ECG   • Arteriosclerosis of coronary artery   • Cardiac murmur   • Essential hypertension   • LAFB (left anterior fascicular block)   • Bundle branch block, right   • Neuropathic arthropathy   • Type 2 diabetes mellitus with circulatory disorder, without long-term current use of insulin (CMS/HCC)   • SHASTA (obstructive sleep apnea)   • Gain of weight   • Gout   • Class 1 obesity due to excess calories without serious comorbidity with body mass index (BMI) of 30.0 to 30.9 in adult   • 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 (CMS/[facility])   • Chronic venous insufficiency   • Nonrheumatic aortic valve stenosis   • Carotid stenosis, asymptomatic   • Bradycardia   • Hyperlipidemia   • Bilateral carotid artery disease (CMS/HCC)   • Abnormal cardiovascular stress test   • H/O aortic valve replacement with porcine valve   • Charcot-Davida-Tooth disease-like deformity of foot   • Amputated toe of right foot (CMS/HCC)   • Fall   • Non-traumatic rhabdomyolysis   • S/P CABG x 2   • CKD (chronic kidney disease) stage 3, GFR 30-59 ml/min (CMS/HCC)   • Rupture of left quadriceps tendon   • Constipation   • Acute kidney injury (CMS/HCC)   • Wound of right leg   • Anemia   • Chronic diastolic (congestive) heart failure (CMS/[facility])   • Constipation     Past Medical History:   Diagnosis Date   • Allergic rhinitis    • Bronchitis    • Coronary artery disease    • Diabetes mellitus (CMS/HCC)    • DM (diabetes mellitus) (CMS/[facility])    • Encounter for annual health examination 2014    Annual Health Assessment   • Gout    • Hiatal hernia    • History of MRSA infection     RIGHT  FOOT 2010   • Hyperlipidemia    • Hypertension    • Murmur    • Pneumothorax on right    • Sleep apnea     pt wears CPAP at night   • Wellness examination 06/24/2015    Annual Wellness Visit     Past Surgical History:   Procedure Laterality Date   • ARTERY SURGERY Bilateral     carotid   • CARDIAC CATHETERIZATION N/A 7/20/2018    Procedure: Left Heart Cath;  Surgeon: Jo Toney MD;  Location: [facility] CATH [facility]VASIVE LOC[facility];  Service: Cardiovascular   • CARDIAC CATHETERIZATION N/A 7/20/2018    Procedure: Coronary angiography;  Surgeon: Jo Toney MD;  Location: [facility] CATH INVASIVE LOCATION;  Service: Cardiovascular   • CAROTID ENDARTERECTOMY Bilateral    • COLONOSCOPY  2008   • CORONARY ARTERY BYPASS GRAFT WITH AORTIC VALVE REPAIR/REPLACEMENT N/A 7/23/2018    Procedure: INTRAOPERATIVE ALEX, MIDLINE STERNOTOMY, CORONARY ARTERY BYPASS GRAFTING X 3 USING ENDOSCOPICALLY HARVESTED LEFT GREATER SAPHENOUS VEIN,  AORTIC VALVE REPLACEMENT USING 25MM LOPEZ II ULTRA PORCINE VALVE, PRP;  Surgeon: Phong Posey MD;  Location: [facility] OR;  Service: Cardiothoracic   • FOOT SURGERY Right 2010    5th digit removal   • FOOT SURGERY Left 2011    1 digit removed   • INCISION AND DRAINAGE LEG Right 3/28/2020    Procedure: DEBRIDMENT OF RIGHT CALF;  Surgeon: Ross Pineda MD;  Location: [facility] OR;  Service: Vascular;  Laterality: Right;   • QUADRICEPS TENDON REPAIR Left 5/14/2019    Procedure: Left QUADRICEPS TENDON REPAIR;  Surgeon: Camilo Hunter MD;  Location: [facility] OR;  Service: Orthopedics   • THORACENTESIS Right 11/21/2016   • THORACOSCOPY Right 5/8/2017    Procedure: BRONCHOSCOPY, RIGHT VAT,  TOTAL DECORTICATION RIGHT LUNG, PLEURAL BX, PLACEMENT SUBPLEURAL PAIN CAATHETERS X2;  Surgeon: Donald Orlando III, MD;  Location: [facility] OR;  Service:    • TONSILECTOMY, ADENOIDECTOMY, BILATERAL MYRINGOTOMY AND TUBES       General Information     Row Name 04/02/20 1123          PT  Evaluation Time/Intention    Document Type  therapy note (daily note)  -PH     Mode of Treatment  physical therapy  -PH     Row Name 04/02/20 1123          Safety Issues, Functional Mobility    Impairments Affecting Function (Mobility)  endurance/activity tolerance;strength;balance  -PH       User Key  (r) = Recorded By, (t) = Taken By, (c) = Cosigned By    Initials Name Provider Type    PH Yaya Lucy, PTA Physical Therapy Assistant        Mobility     Row Name 04/02/20 1124          Bed Mobility Assessment/Treatment    Bed Mobility Assessment/Treatment  supine-sit  -PH     Supine-Sit Greer (Bed Mobility)  supervision  -PH     Comment (Bed Mobility)  extra time req; vc for push off  -PH     Row Name 04/02/20 1124          Transfer Assessment/Treatment    Comment (Transfers)  STS x 3; pt req CGA w/ extremely pt forward flexed and req extra time for upright postion. Discussed w/ pt that SNF will benefit pt to make him stronger and safer when performing STS.  -PH     Row Name 04/02/20 1124          Sit-Stand Transfer    Sit-Stand Greer (Transfers)  verbal cues;contact guard;nonverbal cues (demo/gesture)  -PH     Assistive Device (Sit-Stand Transfers)  walker, front-wheeled  -PH     Row Name 04/02/20 1124          Gait/Stairs Assessment/Training    Gait/Stairs Assessment/Training  gait/ambulation independence  -PH     Greer Level (Gait)  contact guard  -PH     Assistive Device (Gait)  walker, front-wheeled  -PH     Distance in Feet (Gait)  15' x 2 w/ 1 rest at EOB for 2-3 min after each trial then 6-7 steps to chair.  -PH     Pattern (Gait)  step-to  -PH     Deviations/Abnormal Patterns (Gait)  gait speed decreased;crispin decreased  -PH     Bilateral Gait Deviations  forward flexed posture  -PH     Comment (Gait/Stairs)  B foot deformities noted; cueing to keep fww close until seated.   -PH       User Key  (r) = Recorded By, (t) = Taken By, (c) = Cosigned By    Initials Name Provider  Type    PH Lucy Javier PTA Physical Therapy Assistant        Obj/Interventions     Row Name 04/02/20 1128          Therapeutic Exercise    Lower Extremity (Therapeutic Exercise)  LAQ (long arc quad), bilateral;marching while seated;SLR (straight leg raise), bilateral B AP  -PH     Exercise Type (Therapeutic Exercise)  AROM (active range of motion);AAROM (active assistive range of motion)  -PH     Position (Therapeutic Exercise)  seated;other (see comments) reclined  -PH     Sets/Reps (Therapeutic Exercise)  1/10  -PH     Comment (Therapeutic Exercise)  AAROM for B SLR; Pt asked to perform B SLRs and B AP at reg intervals. He reported he hadn't performed exercises as asked yesterday.   -     Row Name 04/02/20 1128          Static Sitting Balance    Level of Isabella (Unsupported Sitting, Static Balance)  supervision  -PH     Sitting Position (Unsupported Sitting, Static Balance)  sitting on edge of bed  -PH     Time Able to Maintain Position (Unsupported Sitting, Static Balance)  2 to 3 minutes  -PH     Comment (Unsupported Sitting, Static Balance)  x 3  -PH       User Key  (r) = Recorded By, (t) = Taken By, (c) = Cosigned By    Initials Name Provider Type     Lucy Javier PTA Physical Therapy Assistant        Goals/Plan    No documentation.       Clinical Impression     Row Name 04/02/20 1130          Pain Scale: Numbers Pre/Post-Treatment    Pain Scale: Numbers, Pretreatment  0/10 - no pain  -     Pain Scale: Numbers, Post-Treatment  0/10 - no pain  -PH     Row Name 04/02/20 1130          Plan of Care Review    Plan of Care Reviewed With  patient  -     Outcome Summary  Pt improving w/ amb of 12-15' x 2 plus transfer b>c in 6-7 steps req fww and CGA. Pt was sup for s>s today given extra time. Pt also req extra time to assume upright posture when performing  STS x 3. Discussed w/ pt the benefit of SNF to improve his weakness and safety when performing STS. PT will see pt again in  two days to limit exposure and ask [facility] staff to continue to amb pt at reg intervals. Pt would benefit from SNF to improve his strength, mobility, and endurance as well as improve his overall safety as he has limited assistance living home alone.   -PH     Row Name 04/02/20 1130          Vital Signs    O2 Delivery Pre Treatment  room air  -PH     O2 Delivery Intra Treatment  room air  -PH     O2 Delivery Post Treatment  room air  -PH     Row Name 04/02/20 1130          Positioning and Restraints    Pre-Treatment Position  in bed  -PH     Post Treatment Position  chair  -PH     In Chair  reclined;call light within reach;encouraged to call for assist;exit alarm on  -PH       User Key  (r) = Recorded By, (t) = Taken By, (c) = Cosigned By    Initials Name Provider Type    Lucy Chapman PTA Physical Therapy Assistant        Outcome Measures     Row Name 04/02/20 1135          How much help from another person do you currently need...    Turning from your back to your side while in flat bed without using bedrails?  4  -PH     Moving from lying on back to sitting on the side of a flat bed without bedrails?  4  -PH     Moving to and from a bed to a chair (including a wheelchair)?  3  -PH     Standing up from a chair using your arms (e.g., wheelchair, bedside chair)?  3  -PH     Climbing 3-5 steps with a railing?  2  -PH     To walk in hospital room?  3  -PH     AM-PAC 6 Clicks Score (PT)  19  -PH     Row Name 04/02/20 1135          Functional Assessment    Outcome Measure Options  AM-PAC 6 Clicks Basic Mobility (PT)  -PH       User Key  (r) = Recorded By, (t) = Taken By, (c) = Cosigned By    Initials Name Provider Type    Lucy Chapman PTA Physical Therapy Assistant          PT Recommendation and Plan     Outcome Summary/Treatment Plan (PT)  Anticipated Equipment Needs at Discharge (PT): other (see comments)(bed rail)  Anticipated Discharge Disposition (PT): skilled nursing facility  Plan of Care  Reviewed With: patient  Progress: improving  Outcome Summary: Pt improving w/ amb of 12-15' x 2 plus transfer b>c in 6-7 steps req fww and CGA. Pt was sup for s>s today given extra time. Pt also req extra time to assume upright posture when performing  STS x 3. Discussed w/ pt the benefit of SNF to improve his weakness and safety when performing STS. PT will see pt again in two days to limit exposure and ask ns staff to continue to amb pt at reg intervals. Pt would benefit from SNF to improve his strength, mobility, and endurance as well as improve his overall safety as he has limited assistance living home alone.      Time Calculation:   PT Charges     Row Name 04/02/20 1137             Time Calculation    Start Time  0934  -PH      Stop Time  1001  -PH      Time Calculation (min)  27 min  -PH      PT Received On  04/02/20  -PH      PT - Next Appointment  04/04/20  -PH        User Key  (r) = Recorded By, (t) = Taken By, (c) = Cosigned By    Initials Name Provider Type    PH Lucy Javier PTA Physical Therapy Assistant        Therapy Charges for Today     Code Description Service Date Service Provider Modifiers Qty    46790032841 HC PT THER PROC EA 15 MIN 4/1/2020 Lucy Javier PTA GP 2    84437574119 HC PT THER PROC EA 15 MIN 4/2/2020 Lucy Javier PTA GP 2          PT G-Codes  Outcome Measure Options: AM-PAC 6 Clicks Basic Mobility (PT)  AM-PAC 6 Clicks Score (PT): 19  AM-PAC 6 Clicks Score (OT): 14    Lucy Javier PTA  4/2/2020

## 2020-04-02 NOTE — PROGRESS NOTES
" Pharmacy Consult - Venofer dosing for anemia     Rock KELLY Maciel Jr. has been consulted for pharmacy to dose venofer for iron deficiency anemia of CKD per Dr. Bishop 's request.      Relevant clinical data and objective history reviewed:  74 y.o. male 185.4 cm (73\") (!) 141 kg (311 lb 6.4 oz)          Past Medical History:   Diagnosis Date    Allergic rhinitis      Bronchitis      Coronary artery disease      Diabetes mellitus (CMS/[facility]) 2001    DM (diabetes mellitus) (CMS/[facility])      Encounter for annual health examination 05/06/2014     Annual Health Assessment    Gout      Hiatal hernia      History of MRSA infection       RIGHT FOOT 2010    Hyperlipidemia      Hypertension      Murmur      Pneumothorax on right      Sleep apnea       pt wears CPAP at night    Wellness examination 06/24/2015     Annual Wellness Visit      is allergic to ceclor [cefaclor].           The usual adult total treatment course of venofer for iron deficiency anemia is 1000mg (5 occasions over 14 days) per package insert. Recommend dosing every 3 days for a total of 5 doses. Patient received 200mg of venofer on Sunday night and Monday afternoon  Per Dr. Murphy via secure chat will give another 200mg of Venofer this afternoon.    Thanks  Rod [facility].  "

## 2020-04-02 NOTE — PROGRESS NOTES
Continued Stay Note  [facility]     Patient Name: Rock Maciel Jr.  MRN: [id number]  Today's Date: 4/2/2020    Admit Date: 3/26/2020    Discharge Plan     Row Name 04/02/20 1146       Plan    Plan  [facility]    Patient/Family in Agreement with Plan  yes    Plan Comments  Patient accepted at [facility]. Pre-cert started today. Vera Pinto RN        Discharge Codes    No documentation.             Vera Pinto RN

## 2020-04-03 VITALS
HEART RATE: 58 BPM | SYSTOLIC BLOOD PRESSURE: 143 MMHG | TEMPERATURE: 98.1 F | DIASTOLIC BLOOD PRESSURE: 63 MMHG | BODY MASS INDEX: 39.44 KG/M2 | WEIGHT: 297.62 LBS | RESPIRATION RATE: 18 BRPM | HEIGHT: 73 IN | OXYGEN SATURATION: 97 %

## 2020-04-03 LAB
ALBUMIN SERPL-MCNC: 2.9 G/DL (ref 3.5–5.2)
ANION GAP SERPL CALCULATED.3IONS-SCNC: 13.3 MMOL/L (ref 5–15)
BASOPHILS # BLD AUTO: 0.02 10*3/MM3 (ref 0–0.2)
BASOPHILS NFR BLD AUTO: 0.3 % (ref 0–1.5)
BUN BLD-MCNC: 32 MG/DL (ref 8–23)
BUN/CREAT SERPL: 19.5 (ref 7–25)
CALCIUM SPEC-SCNC: 8.7 MG/DL (ref 8.6–10.5)
CHLORIDE SERPL-SCNC: 94 MMOL/L (ref 98–107)
CO2 SERPL-SCNC: 28.7 MMOL/L (ref 22–29)
CREAT BLD-MCNC: 1.64 MG/DL (ref 0.76–1.27)
DEPRECATED RDW RBC AUTO: 43.1 FL (ref 37–54)
EOSINOPHIL # BLD AUTO: 0.25 10*3/MM3 (ref 0–0.4)
EOSINOPHIL NFR BLD AUTO: 4 % (ref 0.3–6.2)
ERYTHROCYTE [DISTWIDTH] IN BLOOD BY AUTOMATED COUNT: 13.1 % (ref 12.3–15.4)
GFR SERPL CREATININE-BSD FRML MDRD: 41 ML/MIN/1.73
GLUCOSE BLD-MCNC: 161 MG/DL (ref 65–99)
GLUCOSE BLDC GLUCOMTR-MCNC: 172 MG/DL (ref 70–130)
GLUCOSE BLDC GLUCOMTR-MCNC: 224 MG/DL (ref 70–130)
GLUCOSE BLDC GLUCOMTR-MCNC: 230 MG/DL (ref 70–130)
HCT VFR BLD AUTO: 30.1 % (ref 37.5–51)
HGB BLD-MCNC: 10.2 G/DL (ref 13–17.7)
IMM GRANULOCYTES # BLD AUTO: 0.04 10*3/MM3 (ref 0–0.05)
IMM GRANULOCYTES NFR BLD AUTO: 0.6 % (ref 0–0.5)
LYMPHOCYTES # BLD AUTO: 1.41 10*3/MM3 (ref 0.7–3.1)
LYMPHOCYTES NFR BLD AUTO: 22.6 % (ref 19.6–45.3)
MAGNESIUM SERPL-MCNC: 2.2 MG/DL (ref 1.6–2.4)
MCH RBC QN AUTO: 30.7 PG (ref 26.6–33)
MCHC RBC AUTO-ENTMCNC: 33.9 G/DL (ref 31.5–35.7)
MCV RBC AUTO: 90.7 FL (ref 79–97)
MONOCYTES # BLD AUTO: 0.39 10*3/MM3 (ref 0.1–0.9)
MONOCYTES NFR BLD AUTO: 6.3 % (ref 5–12)
NEUTROPHILS # BLD AUTO: 4.12 10*3/MM3 (ref 1.7–7)
NEUTROPHILS NFR BLD AUTO: 66.2 % (ref 42.7–76)
NRBC BLD AUTO-RTO: 0 /100 WBC (ref 0–0.2)
PHOSPHATE SERPL-MCNC: 3.8 MG/DL (ref 2.5–4.5)
PLATELET # BLD AUTO: 245 10*3/MM3 (ref 140–450)
PMV BLD AUTO: 9.2 FL (ref 6–12)
POTASSIUM BLD-SCNC: 3.4 MMOL/L (ref 3.5–5.2)
RBC # BLD AUTO: 3.32 10*6/MM3 (ref 4.14–5.8)
SODIUM BLD-SCNC: 136 MMOL/L (ref 136–145)
WBC NRBC COR # BLD: 6.23 10*3/MM3 (ref 3.4–10.8)

## 2020-04-03 PROCEDURE — 63710000001 INSULIN LISPRO (HUMAN) PER 5 UNITS: Performed by: SURGERY

## 2020-04-03 PROCEDURE — 99238 HOSP IP/OBS DSCHRG MGMT 30/<: CPT | Performed by: INTERNAL MEDICINE

## 2020-04-03 PROCEDURE — 97530 THERAPEUTIC ACTIVITIES: CPT

## 2020-04-03 PROCEDURE — 82962 GLUCOSE BLOOD TEST: CPT

## 2020-04-03 PROCEDURE — 97535 SELF CARE MNGMENT TRAINING: CPT

## 2020-04-03 PROCEDURE — 85025 COMPLETE CBC W/AUTO DIFF WBC: CPT | Performed by: INTERNAL MEDICINE

## 2020-04-03 PROCEDURE — 83735 ASSAY OF MAGNESIUM: CPT | Performed by: INTERNAL MEDICINE

## 2020-04-03 PROCEDURE — 80069 RENAL FUNCTION PANEL: CPT | Performed by: INTERNAL MEDICINE

## 2020-04-03 RX ORDER — BUMETANIDE 2 MG/1
2 TABLET ORAL DAILY
Start: 2020-04-04 | End: 2020-05-22

## 2020-04-03 RX ORDER — TRAMADOL HYDROCHLORIDE 50 MG/1
50 TABLET ORAL EVERY 6 HOURS PRN
Qty: 15 TABLET | Refills: 0 | Status: SHIPPED | OUTPATIENT
Start: 2020-04-03 | End: 2020-08-04

## 2020-04-03 RX ORDER — POTASSIUM CHLORIDE 750 MG/1
20 CAPSULE, EXTENDED RELEASE ORAL DAILY
Status: DISCONTINUED | OUTPATIENT
Start: 2020-04-03 | End: 2020-04-03

## 2020-04-03 RX ORDER — POTASSIUM CHLORIDE 750 MG/1
40 CAPSULE, EXTENDED RELEASE ORAL DAILY
Start: 2020-04-04 | End: 2023-03-23 | Stop reason: DRUGHIGH

## 2020-04-03 RX ORDER — POTASSIUM CHLORIDE 750 MG/1
40 CAPSULE, EXTENDED RELEASE ORAL DAILY
Status: DISCONTINUED | OUTPATIENT
Start: 2020-04-03 | End: 2020-04-03

## 2020-04-03 RX ORDER — CIPROFLOXACIN 500 MG/1
500 TABLET, FILM COATED ORAL EVERY 12 HOURS SCHEDULED
Qty: 2 TABLET | Refills: 0
Start: 2020-04-03 | End: 2020-04-04

## 2020-04-03 RX ORDER — POTASSIUM CHLORIDE 750 MG/1
20 CAPSULE, EXTENDED RELEASE ORAL DAILY
Status: DISCONTINUED | OUTPATIENT
Start: 2020-04-04 | End: 2020-04-03 | Stop reason: HOSPADM

## 2020-04-03 RX ORDER — HYDRALAZINE HYDROCHLORIDE 50 MG/1
50 TABLET, FILM COATED ORAL EVERY 8 HOURS SCHEDULED
Start: 2020-04-03 | End: 2020-05-22

## 2020-04-03 RX ORDER — POLYETHYLENE GLYCOL 3350 17 G/17G
17 POWDER, FOR SOLUTION ORAL DAILY
Start: 2020-04-04 | End: 2021-02-08

## 2020-04-03 RX ORDER — ATORVASTATIN CALCIUM 20 MG/1
20 TABLET, FILM COATED ORAL NIGHTLY
Start: 2020-04-03 | End: 2020-05-22

## 2020-04-03 RX ADMIN — POTASSIUM CHLORIDE 40 MEQ: 10 CAPSULE, COATED, EXTENDED RELEASE ORAL at 10:37

## 2020-04-03 RX ADMIN — OXYCODONE HYDROCHLORIDE AND ACETAMINOPHEN 250 MG: 500 TABLET ORAL at 08:11

## 2020-04-03 RX ADMIN — VITAMIN D, TAB 1000IU (100/BT) 2000 UNITS: 25 TAB at 08:10

## 2020-04-03 RX ADMIN — SODIUM CHLORIDE, PRESERVATIVE FREE 10 ML: 5 INJECTION INTRAVENOUS at 08:17

## 2020-04-03 RX ADMIN — INSULIN LISPRO 2 UNITS: 100 INJECTION, SOLUTION INTRAVENOUS; SUBCUTANEOUS at 08:09

## 2020-04-03 RX ADMIN — GLIPIZIDE 5 MG: 5 TABLET ORAL at 17:12

## 2020-04-03 RX ADMIN — GLIPIZIDE 5 MG: 5 TABLET ORAL at 08:11

## 2020-04-03 RX ADMIN — METOPROLOL TARTRATE 25 MG: 25 TABLET ORAL at 08:11

## 2020-04-03 RX ADMIN — INSULIN LISPRO 3 UNITS: 100 INJECTION, SOLUTION INTRAVENOUS; SUBCUTANEOUS at 17:12

## 2020-04-03 RX ADMIN — HYDRALAZINE HYDROCHLORIDE 50 MG: 50 TABLET, FILM COATED ORAL at 06:02

## 2020-04-03 RX ADMIN — POLYETHYLENE GLYCOL 3350 17 G: 17 POWDER, FOR SOLUTION ORAL at 08:12

## 2020-04-03 RX ADMIN — TRAMADOL HYDROCHLORIDE 50 MG: 50 TABLET, FILM COATED ORAL at 16:08

## 2020-04-03 RX ADMIN — INSULIN LISPRO 3 UNITS: 100 INJECTION, SOLUTION INTRAVENOUS; SUBCUTANEOUS at 12:03

## 2020-04-03 RX ADMIN — CIPROFLOXACIN HYDROCHLORIDE 500 MG: 500 TABLET, FILM COATED ORAL at 08:09

## 2020-04-03 RX ADMIN — CLOPIDOGREL 75 MG: 75 TABLET, FILM COATED ORAL at 08:11

## 2020-04-03 RX ADMIN — HYDRALAZINE HYDROCHLORIDE 50 MG: 50 TABLET, FILM COATED ORAL at 15:10

## 2020-04-03 RX ADMIN — BUMETANIDE 2 MG: 2 TABLET ORAL at 08:12

## 2020-04-03 NOTE — PROGRESS NOTES
"NEPHROLOGY PROGRESS NOTE------[facility]    [facility]  [phone]  Jamil Stevens MD      Patient Care Team:  Karen Red MD as PCP - General (Family Medicine)  Azam Banegas Jr., MD as Consulting Physician (Cardiology)  Jamil Stevens MD as Consulting Physician (Nephrology)      Provider:  Jamil Stevens MD  Patient Name: Rock Maciel Jr.  :  1945    SUBJECTIVE:  F/u CKD  Patient without complaints. No SOB, CP, palpitations, cramping.  Swelling better    Medication:    atorvastatin 20 mg Oral Nightly   bumetanide 2 mg Oral Daily   cholecalciferol 2,000 Units Oral Daily   ciprofloxacin 500 mg Oral Q12H   clopidogrel 75 mg Oral Daily   glipizide 5 mg Oral BID AC   hydrALAZINE 50 mg Oral Q8H   insulin lispro 0-7 Units Subcutaneous 4x Daily With Meals & Nightly   metoprolol tartrate 25 mg Oral Q12H   polyethylene glycol 17 g Oral Daily   potassium chloride 40 mEq Oral Daily   sodium chloride 10 mL Intravenous Q12H   vitamin C 250 mg Oral Daily       lactated ringers 9 mL/hr Last Rate: 9 mL/hr (20 1122)   Pharmacy Consult - Pharmacy to dose         OBJECTIVE    Vital Sign Min/Max for last 24 hours  Temp  Min: 97.3 °F (36.3 °C)  Max: 98.4 °F (36.9 °C)   BP  Min: 145/62  Max: 165/67   Pulse  Min: 56  Max: 75   Resp  Min: 16  Max: 18   SpO2  Min: 92 %  Max: 95 %   No data recorded   Weight  Min: 135 kg (297 lb 9.9 oz)  Max: 135 kg (297 lb 9.9 oz)     Flowsheet Rows      First Filed Value   Admission Height  185.4 cm (73\") Documented at 2020 1606   Admission Weight  136 kg (300 lb) Documented at 2020 1633          I/O this shift:  In: 720 [P.O.:720]  Out: 400 [Urine:400]  I/O last 3 completed shifts:  In: -   Out:  [Urine:]    Physical Exam:  General Appearance: alert, appears stated age and cooperative  Head: normocephalic, without obvious abnormality and atraumatic  Eyes: conjunctivae and sclerae normal and no icterus  Neck: supple " and no JVD  Lungs: clear to auscultation and respirations regular  Heart: regular rhythm & normal rate and normal S1, S2 +PEYTON  Chest: Wall no abnormalities observed  Abdomen: normal bowel sounds and soft non-tender +OBESITY  Extremities: moves extremities well, 1+ BILAT LE EDEMA, no cyanosis and no redness  Skin: LE WOUND WRAPPED  Neurologic: Alert, and oriented. No focal deficits    Labs:    WBC WBC   Date Value Ref Range Status   04/03/2020 6.23 3.40 - 10.80 10*3/mm3 Final   04/02/2020 5.00 3.40 - 10.80 10*3/mm3 Final   04/01/2020 4.93 3.40 - 10.80 10*3/mm3 Final      HGB Hemoglobin   Date Value Ref Range Status   04/03/2020 10.2 (L) 13.0 - 17.7 g/dL Final   04/02/2020 9.4 (L) 13.0 - 17.7 g/dL Final   04/01/2020 10.1 (L) 13.0 - 17.7 g/dL Final      HCT Hematocrit   Date Value Ref Range Status   04/03/2020 30.1 (L) 37.5 - 51.0 % Final   04/02/2020 28.5 (L) 37.5 - 51.0 % Final   04/01/2020 30.0 (L) 37.5 - 51.0 % Final      Platlets No results found for: LABPLAT   MCV MCV   Date Value Ref Range Status   04/03/2020 90.7 79.0 - 97.0 fL Final   04/02/2020 91.9 79.0 - 97.0 fL Final   04/01/2020 92.0 79.0 - 97.0 fL Final          Sodium Sodium   Date Value Ref Range Status   04/03/2020 136 136 - 145 mmol/L Final   04/02/2020 139 136 - 145 mmol/L Final   04/01/2020 137 136 - 145 mmol/L Final      Potassium Potassium   Date Value Ref Range Status   04/03/2020 3.4 (L) 3.5 - 5.2 mmol/L Final   04/02/2020 3.9 3.5 - 5.2 mmol/L Final   04/01/2020 3.8 3.5 - 5.2 mmol/L Final      Chloride Chloride   Date Value Ref Range Status   04/03/2020 94 (L) 98 - 107 mmol/L Final   04/02/2020 97 (L) 98 - 107 mmol/L Final   04/01/2020 95 (L) 98 - 107 mmol/L Final      CO2 CO2   Date Value Ref Range Status   04/03/2020 28.7 22.0 - 29.0 mmol/L Final   04/02/2020 30.1 (H) 22.0 - 29.0 mmol/L Final   04/01/2020 30.5 (H) 22.0 - 29.0 mmol/L Final      BUN BUN   Date Value Ref Range Status   04/03/2020 32 (H) 8 - 23 mg/dL Final   04/02/2020 33 (H) 8 -  23 mg/dL Final   04/01/2020 33 (H) 8 - 23 mg/dL Final      Creatinine Creatinine   Date Value Ref Range Status   04/03/2020 1.64 (H) 0.76 - 1.27 mg/dL Final   04/02/2020 1.63 (H) 0.76 - 1.27 mg/dL Final   04/01/2020 1.63 (H) 0.76 - 1.27 mg/dL Final      Calcium Calcium   Date Value Ref Range Status   04/03/2020 8.7 8.6 - 10.5 mg/dL Final   04/02/2020 8.8 8.6 - 10.5 mg/dL Final   04/01/2020 9.3 8.6 - 10.5 mg/dL Final      PO4 No components found for: PO4   Albumin Albumin   Date Value Ref Range Status   04/03/2020 2.90 (L) 3.50 - 5.20 g/dL Final   04/02/2020 3.00 (L) 3.50 - 5.20 g/dL Final   04/01/2020 2.90 (L) 3.50 - 5.20 g/dL Final      Magnesium Magnesium   Date Value Ref Range Status   04/03/2020 2.2 1.6 - 2.4 mg/dL Final      Uric Acid No components found for: URIC ACID     Imaging Results (Last 72 Hours)     ** No results found for the last 72 hours. **          Results for orders placed during the hospital encounter of 03/26/20   XR Chest PA & Lateral    Narrative XR CHEST PA AND LATERAL-     Clinical: Congestive heart failure     COMPARISON 7/26/2018     FINDINGS: There is a ventilation of the right hemidiaphragm which is  similar to several prior examinations. Cardiac size upper limits of  normal to mildly enlarged. There are median sternotomy wires in  position. No pleural effusion is demonstrated. No gross pulmonary edema  demonstrated. No acute consolidation. Subtle diffuse increase in the  interstitial pattern suggests early or mild vascular congestion.  Mediastinum and yazmin are stable. The remainder is unremarkable.     CONCLUSION: Suggestion of perhaps early or mild vascular congestion, no  effusion or gross pulmonary edema.     This report was finalized on 3/27/2020 6:20 AM by Dr. Marcus Lentz M.D.      XR Tibia Fibula 2 View Right    Narrative XR TIBIA FIBULA 2 VW RIGHT-     Clinical: Rule out osteomyelitis     COMPARISON 3/15/2020     FINDINGS: Right knee and ankle joint degeneration. Vascular  arterial  calcifications within the soft tissues. No cortical bone destruction or  periosteal abnormality has developed suggestive of underlying  osteomyelitis. No fracture. No soft tissue gas nor radiopaque foreign  body seen.     CONCLUSION:  1. No cortical bone destruction or periosteal abnormality to suggest  underlying osteomyelitis.  2. No soft tissue gas to radiopaque foreign body seen.  3. Right knee and ankle joint degeneration.     This report was finalized on 3/26/2020 5:38 PM by Dr. Marcus Lentz M.D.          Results for orders placed during the hospital encounter of 03/26/20   Duplex Venous Lower Extremity - Bilateral CAR    Narrative · Normal bilateral lower extremity venous duplex scan.            ASSESSMENT / PLAN      Wound of right leg    Arteriosclerosis of coronary artery    Essential hypertension    LAFB (left anterior fascicular block)    Neuropathic arthropathy    Type 2 diabetes mellitus with circulatory disorder, without long-term current use of insulin (CMS/[facility])    SHASTA (obstructive sleep apnea)    Class 1 obesity due to excess calories without serious comorbidity with body mass index (BMI) of 30.0 to 30.9 in adult    Bilateral carotid artery disease (CMS/[facility])    H/O aortic valve replacement with porcine valve    S/P CABG x 2    CKD (chronic kidney disease) stage 3, GFR 30-59 ml/min (CMS/[facility])    Acute kidney injury (CMS/[facility])    Anemia    Chronic diastolic (congestive) heart failure (CMS/[facility])    Constipation    1. KILLIAN/CKD STG 3------KILLIAN on top of known CKD STG 3, with a baseline serum creatinine of about 1.4-1.5. CRF/CKD STG 3 secondary to DGS/HTN NS. KILLIAN is secondary to some prerenal/hemodynamic fluctuation from decompensated CHF (Cardiorenal). Cautiously diurese and follow. Consider d/c Vancomycin once C and S from wound get back b/c of risk of ATN from combo of Zosyn/Vanc. Backed down Zosyn.   No NSAIDs or IV dye. Creatinine already better today. Dose meds for CrCl 30 cc/min     2. HTN  WITH CKD STG 3-----Avoid hypotension. D/C Amlodipine b/c of LE edema (start po Hydralzine in it's place). Change Atenolol to BID Metoprolol given CKD. No ACE/ARB/DRI for now     3. VOLUM EXCESS/EDEMA------ D/C Amlodipine. Some component of chronic venous insufficiency. TSH okay     4. H/O LEFT COMPLEX RENAL CYST------Renal US okay now     5. ANEMIA OF CKD-----Venofer for KEE. Follow for EPO need     6. OA/DJD------No NSAIDs.       7. OBESITY/SHASTA     8. CAD WITH H/O CABG/AVR------No angina at present     9. LE WOUND     10. DMII WITH RENAL MANIFESTATIONS-------Glipizide, Glucometers, SSI     11. VITAMIN D DEFIICENCY     12. HYPERLIPIDEMIA------CK normal. Restarted low dose Statin    13. HYPOALBUMINEMIA------Follow for IV Albumin need    Cr stable, continue bumex once daily  BP ok  Add 20 meq KCL daily  Will follow up in 4 weeks  Awaits placement    Jamil Stevens MD  [facility]  [phone]  04/03/20  13:04

## 2020-04-03 NOTE — PLAN OF CARE
Problem: Patient Care Overview  Goal: Plan of Care Review  Outcome: Ongoing (interventions implemented as appropriate)  Flowsheets (Taken 4/3/2020 0306)  Progress: improving  Plan of Care Reviewed With: patient  Note:   No s/sx of distress noted at this time. Rested well throughout night. Vital signs WDL. Fall precautions maintained. Contact precautions maintained. Wound Vac WDL. Will cont to monitor.

## 2020-04-03 NOTE — PROGRESS NOTES
Continued Stay Note  [facility]     Patient Name: Rock Maciel Jr.  MRN: [id number]  Today's Date: 4/3/2020    Admit Date: 3/26/2020    Discharge Plan     Row Name 04/03/20 1511       Plan    Plan  [facility]    Patient/Family in Agreement with Plan  yes    Plan Comments  Pre-cert recieved. Packet given to nurse. Nurse to apply wet to dry dressing and wound vac is waiting at [facility]. Spoke with patient. His family will provide transport to [facility]. Vera Pinto RN        Discharge Codes    No documentation.       Expected Discharge Date and Time     Expected Discharge Date Expected Discharge Time    Apr 3, 2020             Vera Pinto RN

## 2020-04-03 NOTE — NURSING NOTE
Contacted [facility] for report, was transferred to [phone], line rang for a while. Will try calling back again

## 2020-04-03 NOTE — DISCHARGE SUMMARY
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 mg/dL     POC Glucose Once [[id number]]  (Abnormal) Collected:  03/27/20 1643    Specimen:  Blood Updated:  03/27/20 1644     Glucose 256 mg/dL     Sodium, Urine, Random - Urine, Clean Catch [[id number]] Collected:  03/27/20 1134    Specimen:  Urine, Clean Catch Updated:  03/27/20 1332     Sodium, Urine 43 mmol/L     Narrative:       Reference intervals for random urine have not been established.  Clinical usage is dependent upon physician's interpretation in combination with other laboratory tests.       Eosinophil Smear - Urine, Urine, Clean Catch [[id number]]  (Normal) Collected:  03/27/20 1134    Specimen:  Urine, Clean Catch Updated:  03/27/20 1259     Eosinophil Smear 0 % EOS/100 Cells     Urinalysis, Microscopic Only - Urine, Random Void [[id number]] Collected:  03/27/20 1134    Specimen:  Urine, Random Void Updated:  03/27/20 1209     RBC, UA 0-2 /HPF      WBC, UA 0-2 /HPF      Bacteria, UA None Seen /HPF      Squamous Epithelial Cells, UA None Seen /HPF      Hyaline Casts, UA None Seen /LPF      Amorphous Crystals, UA Small/1+ /HPF      Methodology Manual Light Microscopy    Urinalysis With Culture If Indicated - Urine, Random Void [[id number]]  (Abnormal) Collected:  03/27/20 1134    Specimen:  Urine, Random Void Updated:  03/27/20 1205     Color, UA Yellow     Appearance, UA Clear     pH, UA <=5.0      Specific Gravity, UA 1.020     Glucose, UA Negative     Ketones, UA Negative     Bilirubin, UA Negative     Blood, UA Negative     Protein, UA 30 mg/dL (1+)     Leuk Esterase, UA Negative     Nitrite, UA Negative     Urobilinogen, UA 0.2 E.U./dL    PTH, Intact [[id number]]  (Normal) Collected:  03/27/20 1117    Specimen:  Blood Updated:  03/27/20 1201     PTH, Intact 59.0 pg/mL     Narrative:       Results may be falsely decreased if patient taking Biotin.      CK [[id number]]  (Abnormal) Collected:  03/27/20 1117    Specimen:  Blood Updated:  03/27/20 1155     Creatine Kinase 310 U/L     Uric Acid [[id number]]  (Normal) Collected:  03/27/20 1117    Specimen:  Blood Updated:  03/27/20 1155     Uric Acid 5.1 mg/dL     Iron [[id number]]  (Abnormal) Collected:  03/27/20 1117    Specimen:  Blood Updated:  03/27/20 1155     Iron 13 mcg/dL     Vancomycin, Random [[id number]]  (Normal) Collected:  03/27/20 1047    Specimen:  Blood from Hand, Right Updated:  03/27/20 1133     Vancomycin Random 20.40 mcg/mL     POC Glucose Once [[id number]]  (Abnormal) Collected:  03/27/20 1119    Specimen:  Blood Updated:  03/27/20 1121     Glucose 229 mg/dL     Basic Metabolic Panel [[id number]]  (Abnormal) Collected:  03/27/20 0606    Specimen:  Blood Updated:  03/27/20 0748     Glucose 166 mg/dL      BUN 48 mg/dL      Creatinine 1.58 mg/dL      Sodium 139 mmol/L      Potassium 3.6 mmol/L      Chloride 101 mmol/L      CO2 26.0 mmol/L      Calcium 8.4 mg/dL      eGFR Non African Amer 43 mL/min/1.73      BUN/Creatinine Ratio 30.4     Anion Gap 12.0 mmol/L     Narrative:       GFR Normal >60  Chronic Kidney Disease <60  Kidney Failure <15      Hemoglobin A1c [[id number]]  (Abnormal) Collected:  03/27/20 0606    Specimen:  Blood Updated:  03/27/20 0731     Hemoglobin A1C 7.10 %     Narrative:       Hemoglobin A1C Ranges:    Increased Risk for Diabetes  5.7% to 6.4%  Diabetes                     >= 6.5%  Diabetic Goal                < 7.0%    CBC (No  Diff) [[id number]]  (Abnormal) Collected:  03/27/20 0606    Specimen:  Blood Updated:  03/27/20 0719     WBC 7.80 10*3/mm3      RBC 2.91 10*6/mm3      Hemoglobin 9.1 g/dL      Hematocrit 26.4 %      MCV 90.7 fL      MCH 31.3 pg      MCHC 34.5 g/dL      RDW 12.9 %      RDW-SD 41.9 fl      MPV 10.0 fL      Platelets 217 10*3/mm3     POC Glucose Once [[id number]]  (Abnormal) Collected:  03/27/20 0625    Specimen:  Blood Updated:  03/27/20 0627     Glucose 188 mg/dL     POC Glucose Once [[id number]]  (Abnormal) Collected:  03/26/20 2244    Specimen:  Blood Updated:  03/26/20 2254     Glucose 262 mg/dL     Sedimentation Rate [[id number]]  (Abnormal) Collected:  03/26/20 1632    Specimen:  Blood from Arm, Left Updated:  03/26/20 1713     Sed Rate 95 mm/hr     Comprehensive Metabolic Panel [[id number]]  (Abnormal) Collected:  03/26/20 1632    Specimen:  Blood from Arm, Left Updated:  03/26/20 1704     Glucose 228 mg/dL      BUN 63 mg/dL      Creatinine 1.90 mg/dL      Sodium 133 mmol/L      Potassium 4.3 mmol/L      Chloride 92 mmol/L      CO2 29.7 mmol/L      Calcium 9.4 mg/dL      Total Protein 7.7 g/dL      Albumin 3.90 g/dL      ALT (SGPT) 9 U/L      AST (SGOT) 14 U/L      Alkaline Phosphatase 112 U/L      Total Bilirubin 0.8 mg/dL      eGFR Non African Amer 35 mL/min/1.73      Globulin 3.8 gm/dL      A/G Ratio 1.0 g/dL      BUN/Creatinine Ratio 33.2     Anion Gap 11.3 mmol/L     Narrative:       GFR Normal >60  Chronic Kidney Disease <60  Kidney Failure <15      C-reactive Protein [[id number]]  (Abnormal) Collected:  03/26/20 1632    Specimen:  Blood from Arm, Left Updated:  03/26/20 1704     C-Reactive Protein 14.45 mg/dL     CBC & Differential [[id number]] Collected:  03/26/20 1632    Specimen:  Blood from Arm, Left Updated:  03/26/20 1643    Narrative:       The following orders were created for panel order CBC & Differential.  Procedure                               Abnormality         Status                      ---------                               -----------         ------                     CBC Auto Differential[[id number]]        Abnormal            Final result                 Please view results for these tests on the individual orders.    CBC Auto Differential [[id number]]  (Abnormal) Collected:  03/26/20 1632    Specimen:  Blood from Arm, Left Updated:  03/26/20 1643     WBC 8.43 10*3/mm3      RBC 3.30 10*6/mm3      Hemoglobin 10.1 g/dL      Hematocrit 30.4 %      MCV 92.1 fL      MCH 30.6 pg      MCHC 33.2 g/dL      RDW 13.1 %      RDW-SD 44.0 fl      MPV 9.5 fL      Platelets 238 10*3/mm3      Neutrophil % 79.4 %      Lymphocyte % 12.8 %      Monocyte % 6.4 %      Eosinophil % 0.8 %      Basophil % 0.2 %      Immature Grans % 0.4 %      Neutrophils, Absolute 6.69 10*3/mm3      Lymphocytes, Absolute 1.08 10*3/mm3      Monocytes, Absolute 0.54 10*3/mm3      Eosinophils, Absolute 0.07 10*3/mm3      Basophils, Absolute 0.02 10*3/mm3      Immature Grans, Absolute 0.03 10*3/mm3      nRBC 0.0 /100 WBC           Imaging Results (All)     Procedure Component Value Units Date/Time    US Renal Bilateral [[id number]] Collected:  03/27/20 1526     Updated:  03/27/20 1530    Narrative:       US RENAL BILATERAL-     INDICATIONS: Acute renal failure     TECHNIQUE: ULTRASOUND OF THE KIDNEYS AND URINARY BLADDER.     COMPARISON: Acute renal failure     FINDINGS:     The right kidney measures 11.1 centimeters, the left kidney measures  11.6 centimeters.     No renal lesion is identified, assessment is limited by body habitus. No  hydronephrosis or echogenic nephrolithiasis.     No ureteral jets were observed during the exam. The urinary bladder  otherwise appears unremarkable.       Impression:       No hydronephrosis or echogenic nephrolithiasis.        This report was finalized on 3/27/2020 3:27 PM by Dr. Azam Alaniz M.D.       XR Chest PA & Lateral [[id number]] Collected:  03/27/20 0615     Updated:  03/27/20 0623     "Narrative:       XR CHEST PA AND LATERAL-     Clinical: Congestive heart failure     COMPARISON 7/26/2018     FINDINGS: There is a ventilation of the right hemidiaphragm which is  similar to several prior examinations. Cardiac size upper limits of  normal to mildly enlarged. There are median sternotomy wires in  position. No pleural effusion is demonstrated. No gross pulmonary edema  demonstrated. No acute consolidation. Subtle diffuse increase in the  interstitial pattern suggests early or mild vascular congestion.  Mediastinum and yazmin are stable. The remainder is unremarkable.     CONCLUSION: Suggestion of perhaps early or mild vascular congestion, no  effusion or gross pulmonary edema.     This report was finalized on 3/27/2020 6:20 AM by Dr. Marcus Lentz M.D.       XR Tibia Fibula 2 View Right [[id number]] Collected:  03/26/20 1736     Updated:  03/26/20 1741    Narrative:       XR TIBIA FIBULA 2 VW RIGHT-     Clinical: Rule out osteomyelitis     COMPARISON 3/15/2020     FINDINGS: Right knee and ankle joint degeneration. Vascular arterial  calcifications within the soft tissues. No cortical bone destruction or  periosteal abnormality has developed suggestive of underlying  osteomyelitis. No fracture. No soft tissue gas nor radiopaque foreign  body seen.     CONCLUSION:  1. No cortical bone destruction or periosteal abnormality to suggest  underlying osteomyelitis.  2. No soft tissue gas to radiopaque foreign body seen.  3. Right knee and ankle joint degeneration.     This report was finalized on 3/26/2020 5:38 PM by Dr. Marcus Lentz M.D.             Condition on Discharge:      Stable    Vital Signs:    /62   Pulse 66   Temp 98.1 °F (36.7 °C) (Oral)   Resp 16   Ht 185.4 cm (73\")   Wt 135 kg (297 lb 9.9 oz)   SpO2 92%   BMI 39.27 kg/m²     Physical Exam:    General: Alert and oriented x4, morbidly obese  Heart: Regular rate and rhythm without murmur rub or thrill  Lungs: Clear to auscultation " bilaterally without use of accessory muscles respiration  Abdomen: Soft/nontender/nondistended.  No hepatosplenomegaly is noted.  Right lower extremity: Wound VAC in place.    Discharge Disposition:        Discharge Medication:       Discharge Medications      New Medications      Instructions Start Date   bumetanide 2 MG tablet  Commonly known as:  BUMEX   2 mg, Oral, Daily   Start Date:  April 4, 2020     ciprofloxacin 500 MG tablet  Commonly known as:  CIPRO   500 mg, Oral, Every 12 Hours Scheduled      hydrALAZINE 50 MG tablet  Commonly known as:  APRESOLINE   50 mg, Oral, Every 8 Hours Scheduled      metoprolol tartrate 25 MG tablet  Commonly known as:  LOPRESSOR   25 mg, Oral, Every 12 Hours Scheduled      polyethylene glycol packet  Commonly known as:  MIRALAX   17 g, Oral, Daily   Start Date:  April 4, 2020     potassium chloride 10 MEQ CR capsule  Commonly known as:  MICRO-K   40 mEq, Oral, Daily   Start Date:  April 4, 2020        Changes to Medications      Instructions Start Date   atorvastatin 20 MG tablet  Commonly known as:  LIPITOR  What changed:    · medication strength  · how much to take   20 mg, Oral, Nightly         Continue These Medications      Instructions Start Date   accu-chek soft touch lancets   ACCU-CHEK SOFTCLIX LANCETS      acetaminophen 325 MG tablet  Commonly known as:  TYLENOL   650 mg, Oral, Every 4 Hours PRN      amLODIPine 10 MG tablet  Commonly known as:  NORVASC   10 mg, Oral, Daily      bisacodyl 10 MG suppository  Commonly known as:  DULCOLAX   10 mg, Rectal, Daily PRN      clopidogrel 75 MG tablet  Commonly known as:  PLAVIX   75 mg, Oral, Daily      glipizide 5 MG tablet  Commonly known as:  GLUCOTROL   5 mg, Oral, 2 Times Daily Before Meals, 1 in am and 1 at 5 pm      insulin lispro 100 UNIT/ML injection  Commonly known as:  humaLOG   0-7 Units, Subcutaneous, 4 Times Daily With Meals & Nightly      lactulose 10 GM/15ML solution  Commonly known as:  CHRONULAC   10 g, Oral,  2 Times Daily PRN      traMADol 50 MG tablet  Commonly known as:  ULTRAM   50 mg, Oral, Every 6 Hours PRN      vitamin C 250 MG tablet  Commonly known as:  ASCORBIC ACID   250 mg, Oral, Daily      VITAMIN D3 PO   2,000 Units, Oral, Daily, PT HOLDING FOR SURGERY         Stop These Medications    atenolol 25 MG tablet  Commonly known as:  TENORMIN     clindamycin 300 MG capsule  Commonly known as:  CLEOCIN     furosemide 40 MG tablet  Commonly known as:  LASIX     glucose blood test strip  Commonly known as:  Accu-Chek Keshia Plus            Discharge Diet:     Diet Instructions     Diet: Regular, Consistent Carbohydrate      Discharge Diet:   Regular  Consistent Carbohydrate             Activity at Discharge:     Activity Instructions     Activity as Tolerated            Follow-up Appointments:    Future Appointments   Date Time Provider Department Center   4/24/2020 10:15 AM Apurva Smith APRN [facility] None   5/28/2020  2:30 PM Karen Red MD [facility] None   10/20/2020  8:30 AM Andrés Fuentes MD [facility] None     Additional Instructions for the Follow-ups that You Need to Schedule     Discharge Follow-up with PCP   As directed       Currently Documented PCP:    Karen Red MD    PCP Phone Number:    [phone]     Follow Up Details:  1 week         Discharge Follow-up with Specified Provider: Follow-up at wound care center next week   As directed      To:  Follow-up at wound care center next week               Test Results Pending at Discharge:           Michael Ignacio DO  04/03/20  12:08

## 2020-04-03 NOTE — PLAN OF CARE
Problem: Patient Care Overview  Goal: Plan of Care Review  Outcome: Ongoing (interventions implemented as appropriate)  Flowsheets (Taken 4/3/2020 1111)  Progress: improving  Plan of Care Reviewed With: patient  Note:   Pt tolerates in OT treatment w/ good participation. Pt completes x3 trials of STS transfer during ADL w/ CGA/min A required w/ RW. Pt able to ambulate a few feet from EOB > chair w/ min A/CGA using RW. Pt requires min A for bathing tasks and mod A for toileting. Pt completes grooming and UB dressing w/ s/u. Pt continues to benefit from skilled OT services to maximize indep/safety w/ ADLs and transfers.

## 2020-04-03 NOTE — PROGRESS NOTES
Fredis Fletcher MD    Location: [facility]     LOS: 7 days   Patient Care Team:  Karen Red MD as PCP - General (Family Medicine)  Azam Banegas Jr., MD as Consulting Physician (Cardiology)  Jamil Stevens MD as Consulting Physician (Nephrology)        Subjective     74 y.o. male with right calf wound VAC present.    Review of Systems  Review of Systems - Negative except improved medical management diabetes and renal insufficiency      Objective     Vital Signs  Temp:  [97.3 °F (36.3 °C)-98.4 °F (36.9 °C)] 98.4 °F (36.9 °C)  Heart Rate:  [56-75] 71  Resp:  [18] 18  BP: (149-165)/(59-67) 150/62    Physical Exam  General: No acute distress. Alert and oriented x 4  HEENT: No jugular venous distension, trachea is midline  CV: RRR, S1S2  Resp: Clear unlabored breathing   Abd: Abdomen is soft, nontender, nondistended  Extremities: Wound VAC present right calf    Results Review:       Recent Results (from the past 12 hour(s))   POC Glucose Once    Collection Time: 04/02/20  8:22 PM   Result Value Ref Range    Glucose 260 (H) 70 - 130 mg/dL   Renal Function Panel    Collection Time: 04/03/20  4:37 AM   Result Value Ref Range    Glucose 161 (H) 65 - 99 mg/dL    BUN 32 (H) 8 - 23 mg/dL    Creatinine 1.64 (H) 0.76 - 1.27 mg/dL    Sodium 136 136 - 145 mmol/L    Potassium 3.4 (L) 3.5 - 5.2 mmol/L    Chloride 94 (L) 98 - 107 mmol/L    CO2 28.7 22.0 - 29.0 mmol/L    Calcium 8.7 8.6 - 10.5 mg/dL    Albumin 2.90 (L) 3.50 - 5.20 g/dL    Phosphorus 3.8 2.5 - 4.5 mg/dL    Anion Gap 13.3 5.0 - 15.0 mmol/L    BUN/Creatinine Ratio 19.5 7.0 - 25.0    eGFR Non African Amer 41 (L) >60 mL/min/1.73   Magnesium    Collection Time: 04/03/20  4:37 AM   Result Value Ref Range    Magnesium 2.2 1.6 - 2.4 mg/dL   CBC Auto Differential    Collection Time: 04/03/20  4:37 AM   Result Value Ref Range    WBC 6.23 3.40 - 10.80 10*3/mm3    RBC 3.32 (L) 4.14 - 5.80 10*6/mm3    Hemoglobin 10.2 (L) 13.0 - 17.7 g/dL    Hematocrit 30.1 (L) 37.5  - 51.0 %    MCV 90.7 79.0 - 97.0 fL    MCH 30.7 26.6 - 33.0 pg    MCHC 33.9 31.5 - 35.7 g/dL    RDW 13.1 12.3 - 15.4 %    RDW-SD 43.1 37.0 - 54.0 fl    MPV 9.2 6.0 - 12.0 fL    Platelets 245 140 - 450 10*3/mm3    Neutrophil % 66.2 42.7 - 76.0 %    Lymphocyte % 22.6 19.6 - 45.3 %    Monocyte % 6.3 5.0 - 12.0 %    Eosinophil % 4.0 0.3 - 6.2 %    Basophil % 0.3 0.0 - 1.5 %    Immature Grans % 0.6 (H) 0.0 - 0.5 %    Neutrophils, Absolute 4.12 1.70 - 7.00 10*3/mm3    Lymphocytes, Absolute 1.41 0.70 - 3.10 10*3/mm3    Monocytes, Absolute 0.39 0.10 - 0.90 10*3/mm3    Eosinophils, Absolute 0.25 0.00 - 0.40 10*3/mm3    Basophils, Absolute 0.02 0.00 - 0.20 10*3/mm3    Immature Grans, Absolute 0.04 0.00 - 0.05 10*3/mm3    nRBC 0.0 0.0 - 0.2 /100 WBC   POC Glucose Once    Collection Time: 04/03/20  6:12 AM   Result Value Ref Range    Glucose 172 (H) 70 - 130 mg/dL   ]      Assessment/Plan             Wound of right leg    Arteriosclerosis of coronary artery    Essential hypertension    LAFB (left anterior fascicular block)    Neuropathic arthropathy    Type 2 diabetes mellitus with circulatory disorder, without long-term current use of insulin (CMS/[facility])    SHASTA (obstructive sleep apnea)    Class 1 obesity due to excess calories without serious comorbidity with body mass index (BMI) of 30.0 to 30.9 in adult    Bilateral carotid artery disease (CMS/[facility])    H/O aortic valve replacement with porcine valve    S/P CABG x 2    CKD (chronic kidney disease) stage 3, GFR 30-59 ml/min (CMS/[facility])    Acute kidney injury (CMS/[facility])    Anemia    Chronic diastolic (congestive) heart failure (CMS/[facility])    Constipation      Assessment & Plan  74 y.o. male okay for discharge and plan follow-up with wound care center next week.  Discussed with patient may need to see plastic surgeon for possible skin graft proceed with that.  I will see as needed.      Fredis Fletcher MD  04/03/20  07:36

## 2020-04-03 NOTE — THERAPY TREATMENT NOTE
Acute Care - Occupational Therapy Treatment Note  [facility]     Patient Name: Rock Maciel Jr.  : 1945  MRN: [id number]  Today's Date: 4/3/2020             Admit Date: 3/26/2020       ICD-10-CM ICD-9-CM   1. Acute kidney injury (CMS/HCC) N17.9 584.9   2. Anemia, unspecified type D64.9 285.9   3. Hematoma T14.8XXA 924.9     Patient Active Problem List   Diagnosis   • Abnormal ECG   • Arteriosclerosis of coronary artery   • Cardiac murmur   • Essential hypertension   • LAFB (left anterior fascicular block)   • Bundle branch block, right   • Neuropathic arthropathy   • Type 2 diabetes mellitus with circulatory disorder, without long-term current use of insulin (CMS/HCC)   • SHASTA (obstructive sleep apnea)   • Gain of weight   • Gout   • Class 1 obesity due to excess calories without serious comorbidity with body mass index (BMI) of 30.0 to 30.9 in adult   • 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 (CMS/HCC)   • Chronic venous insufficiency   • Nonrheumatic aortic valve stenosis   • Carotid stenosis, asymptomatic   • Bradycardia   • Hyperlipidemia   • Bilateral carotid artery disease (CMS/HCC)   • Abnormal cardiovascular stress test   • H/O aortic valve replacement with porcine valve   • Charcot-Davida-Tooth disease-like deformity of foot   • Amputated toe of right foot (CMS/HCC)   • Fall   • Non-traumatic rhabdomyolysis   • S/P CABG x 2   • CKD (chronic kidney disease) stage 3, GFR 30-59 ml/min (CMS/HCC)   • Rupture of left quadriceps tendon   • Constipation   • Acute kidney injury (CMS/HCC)   • Wound of right leg   • Anemia   • Chronic diastolic (congestive) heart failure (CMS/HCC)   • Constipation     Past Medical History:   Diagnosis Date   • Allergic rhinitis    • Bronchitis    • Coronary artery disease    • Diabetes mellitus (CMS/HCC)    • DM (diabetes mellitus) (CMS/HCC)    • Encounter for annual health examination 2014    Annual Health Assessment   • Gout    • Hiatal hernia    •  History of MRSA infection     RIGHT FOOT 2010   • Hyperlipidemia    • Hypertension    • Murmur    • Pneumothorax on right    • Sleep apnea     pt wears CPAP at night   • Wellness examination 06/24/2015    Annual Wellness Visit     Past Surgical History:   Procedure Laterality Date   • ARTERY SURGERY Bilateral     carotid   • CARDIAC CATHETERIZATION N/A 7/20/2018    Procedure: Left Heart Cath;  Surgeon: Jo Toney MD;  Location: [facility] CATH INVASIVE LOCATION;  Service: Cardiovascular   • CARDIAC CATHETERIZATION N/A 7/20/2018    Procedure: Coronary angiography;  Surgeon: Jo Toney MD;  Location: [facility] CATH INVASIVE LOCATION;  Service: Cardiovascular   • CAROTID ENDARTERECTOMY Bilateral    • COLONOSCOPY  2008   • CORONARY ARTERY BYPASS GRAFT WITH AORTIC VALVE REPAIR/REPLACEMENT N/A 7/23/2018    Procedure: INTRAOPERATIVE ALEX, MIDLINE STERNOTOMY, CORONARY ARTERY BYPASS GRAFTING X 3 USING ENDOSCOPICALLY HARVESTED LEFT GREATER SAPHENOUS VEIN,  AORTIC VALVE REPLACEMENT USING 25MM LOPEZ II ULTRA PORCINE VALVE, PRP;  Surgeon: Phong Posey MD;  Location: [facility] OR;  Service: Cardiothoracic   • FOOT SURGERY Right 2010    5th digit removal   • FOOT SURGERY Left 2011    1 digit removed   • INCISION AND DRAINAGE LEG Right 3/28/2020    Procedure: DEBRIDMENT OF RIGHT CALF;  Surgeon: Ross Pineda MD;  Location: [facility] OR;  Service: Vascular;  Laterality: Right;   • QUADRICEPS TENDON REPAIR Left 5/14/2019    Procedure: Left QUADRICEPS TENDON REPAIR;  Surgeon: Camlio Hunter MD;  Location: [facility] OR;  Service: Orthopedics   • THORACENTESIS Right 11/21/2016   • THORACOSCOPY Right 5/8/2017    Procedure: BRONCHOSCOPY, RIGHT VAT,  TOTAL DECORTICATION RIGHT LUNG, PLEURAL BX, PLACEMENT SUBPLEURAL PAIN CAATHETERS X2;  Surgeon: Donald Orlando III, MD;  Location: [facility] OR;  Service:    • TONSILECTOMY, ADENOIDECTOMY, BILATERAL MYRINGOTOMY AND TUBES         Therapy  Treatment    Rehabilitation Treatment Summary     Row Name 04/03/20 0913             Treatment Time/Intention    Discipline  occupational therapist  -RD      Document Type  therapy note (daily note)  -RD      Subjective Information  no complaints  -RD      Mode of Treatment  occupational therapy  -RD      Patient/Family Observations  pt semi-supine in bed w/ no signs of acute distress  -RD      Care Plan Review  evaluation/treatment results reviewed;care plan/treatment goals reviewed;patient/other agree to care plan  -RD      Patient Effort  good  -RD      Existing Precautions/Restrictions  fall  -RD      Recorded by [RD] Ernestina Munson, OT 04/03/20 1111      Row Name 04/03/20 0913             Cognitive Assessment/Intervention- PT/OT    Orientation Status (Cognition)  oriented x 3  -RD      Follows Commands (Cognition)  WFL  -RD      Recorded by [RD] Ernestina Munson OT 04/03/20 1111      Row Name 04/03/20 0913             Bed Mobility Assessment/Treatment    Bed Mobility Assessment/Treatment  supine-sit  -RD      Supine-Sit [place] (Bed Mobility)  supervision  -RD      Recorded by [RD] Ernestina Munson OT 04/03/20 1111      Row Name 04/03/20 0913             Functional Mobility    Functional Mobility- Ind. Level  minimum assist (75% patient effort);contact guard assist;verbal cues required  -RD      Functional Mobility- Device  rolling walker  -RD2      Functional Mobility- Comment  pt able to ambulate a few feet from EOB > chair using RW w/ min A  -RD2      Recorded by [RD] Ernestina Munson, OT 04/03/20 1114  [RD2] Ernestina Munson, OT 04/03/20 1111      Row Name 04/03/20 0913             Transfer Assessment/Treatment    Transfer Assessment/Treatment  sit-stand transfer;stand-sit transfer;bed-chair transfer  -RD      Comment (Transfers)  x3 STS transfers completed during session from EOB and chair during ADL  -RD      Recorded by [RD] Ernestina Munson, OT 04/03/20 1111      Row Name  04/03/20 0913             Bed-Chair Transfer    Bed-Chair [place] (Transfers)  minimum assist (75% patient effort);contact guard;verbal cues  -RD      Assistive Device (Bed-Chair Transfers)  walker, front-wheeled  -RD2      Recorded by [RD] Ernestina Munson, OT 04/03/20 1114  [RD2] Ernestina Munson, OT 04/03/20 1111      Row Name 04/03/20 0913             Sit-Stand Transfer    Sit-Stand [place] (Transfers)  contact guard;minimum assist (75% patient effort);verbal cues  -RD      Assistive Device (Sit-Stand Transfers)  walker, front-wheeled  -RD      Recorded by [RD] Ernestina Munson, OT 04/03/20 1111      Row Name 04/03/20 0913             Stand-Sit Transfer    Stand-Sit [place] (Transfers)  contact guard;minimum assist (75% patient effort);verbal cues  -RD      Assistive Device (Stand-Sit Transfers)  walker, front-wheeled  -RD      Recorded by [RD] Ernestina Munson, OT 04/03/20 1111      Row Name 04/03/20 0913             ADL Assessment/Intervention    BADL Assessment/Intervention  bathing;upper body dressing;grooming;toileting  -RD      Recorded by [RD] Ernestina Munson, OT 04/03/20 1111      Row Name 04/03/20 0913             Bathing Assessment/Intervention    Bathing [place] Level  bathing skills;upper body;lower body;minimum assist (75% patient effort);verbal cues  -RD      Bathing Position  supported sitting;supported standing  -RD      Comment (Bathing)  assist w/ buttocks  -RD      Recorded by [RD] Ernestina Munson, OT 04/03/20 1111      Row Name 04/03/20 0913             Upper Body Dressing Assessment/Training    Upper Body Dressing [place] Level  upper body dressing skills;doff;don;pajama/robe;set up  -RD      Upper Body Dressing Position  supported sitting  -RD      Recorded by [RD] Ernestina Munson, OT 04/03/20 1111      Row Name 04/03/20 0913             Grooming Assessment/Training    [place] Level (Grooming)  grooming skills;wash face, hands;set  up;supervision  -RD      Grooming Position  supported sitting  -RD      Recorded by [RD] Ernestina Munson, OT 04/03/20 1111      Row Name 04/03/20 0913             Toileting Assessment/Training    [place] Level (Toileting)  toileting skills;adjust/manage clothing;perform perineal hygiene;change pad/brief;moderate assist (50% patient effort);verbal cues  -RD      Toileting Position  supported sitting;supported standing  -RD      Comment (Toileting)  pt able to complete front rodriguez hygiene- assist for rear rodriguez hygiene and assist for brief management  -RD      Recorded by [RD] Ernestina Munson, OT 04/03/20 1111      Row Name 04/03/20 0913             Motor Skills Assessment/Interventions    Additional Documentation  Balance (Group)  -RD      Recorded by [RD] Ernestina Munson, OT 04/03/20 1111      Row Name 04/03/20 0913             Balance    Balance  static sitting balance;static standing balance  -RD      Recorded by [RD] Ernestina Munson, OT 04/03/20 1111      Row Name 04/03/20 0913             Static Sitting Balance    Level of [place] (Unsupported Sitting, Static Balance)  supervision  -RD      Sitting Position (Unsupported Sitting, Static Balance)  sitting on edge of bed  -RD      Time Able to Maintain Position (Unsupported Sitting, Static Balance)  4 to 5 minutes  -RD      Recorded by [RD] Ernestina Munson, OT 04/03/20 1111      Row Name 04/03/20 0913             Static Standing Balance    Level of [place] (Supported Standing, Static Balance)  contact guard assist  -RD      Time Able to Maintain Position (Supported Standing, Static Balance)  3 to 4 minutes  -RD      Assistive Device Utilized (Supported Standing, Static Balance)  walker, rolling  -RD      Comment (Supported Standing, Static Balance)  during bathing and toileting tasks  -RD      Recorded by [RD] Ernestina Munson, OT 04/03/20 1111      Row Name 04/03/20 0913             Positioning and Restraints    Pre-Treatment  Position  in bed  -RD      Post Treatment Position  chair  -RD      In Chair  reclined;call light within reach;encouraged to call for assist;exit alarm on  -RD      Recorded by [RD] Ernestina Munson, OT 04/03/20 1111      Row Name 04/03/20 0913             Pain Scale: Numbers Pre/Post-Treatment    Pain Scale: Numbers, Pretreatment  0/10 - no pain  -RD      Pain Scale: Numbers, Post-Treatment  0/10 - no pain  -RD      Recorded by [RD] Ernestina Munson, OT 04/03/20 1111      Row Name                Residual Limb Assessment 05/06/19 0900 other (see comments)    Residual Limb Assessment - Properties Group Date first assessed: 05/06/19 [LL] Time first assessed: 0900 [LL] Amputation Date: -- [LL], unknown  Location: other (see comments) [LL], Left great toe, Right small toes (4 &5)  Recorded by:  [LL] Ling Aleman RN 05/07/19 1218    Row Name                Wound 03/28/20 1220 Right anterior calf Incision    Wound - Properties Group Date first assessed: 03/28/20 [AG] Time first assessed: 1220 [AG] Side: Right [AG] Orientation: anterior [AG] Location: calf [AG] Primary Wound Type: Incision [AG] Recorded by:  [AG] Deb Batres RN 03/28/20 1232    Row Name                NPWT (Negative Pressure Wound Therapy) 03/30/20 1530 right anterior leg    NPWT (Negative Pressure Wound Therapy) - Properties Group Placement Date: 03/30/20 [CJ] Placement Time: 1530 [CJ] Location: right anterior leg [CJ] Recorded by:  [CJ] Deena Friedman RN 03/30/20 1544    Row Name 04/03/20 0913             Coping    Observed Emotional State  accepting;calm;cooperative  -RD      Verbalized Emotional State  acceptance  -RD      Recorded by [RD] Ernestina Munson OT 04/03/20 1111      Row Name 04/03/20 0913             Plan of Care Review    Plan of Care Reviewed With  patient  -RD      Recorded by [RD] Ernestina Munson OT 04/03/20 1111      Row Name 04/03/20 0913             Outcome Summary/Treatment Plan (OT)    Anticipated  Discharge Disposition (OT)  [facility] nursing [facility]  -RD      Recorded by [RD] Ernestina Munson OT 04/03/20 1111        User Key  (r) = Recorded By, (t) = Taken By, (c) = Cosigned By    Initials Name Effective Dates Discipline    AG Deb Batres, RN 12/16/19 -  Nurse    Deena Rosales RN 09/21/18 -  Nurse    Ernestina Dsouza, OT 10/14/19 -  OT    LL Ling Aleman RN 07/13/18 - 10/22/19 Nurse        Wound 03/28/20 1220 Right anterior calf Incision (Active)   Closure NIKKI 4/3/2020  8:20 AM       NPWT (Negative Pressure Wound Therapy) 03/30/20 1530 right anterior leg (Active)   Therapy Setting continuous therapy 4/3/2020  8:20 AM   Dressing unable to visualize 4/3/2020  8:20 AM   Pressure Setting 125 mmHg 4/3/2020 12:08 AM           OT Recommendation and Plan  Outcome Summary/Treatment Plan (OT)  Anticipated Discharge Disposition (OT): skilled nursing facility  Plan of Care Review  Plan of Care Reviewed With: patient  Plan of Care Reviewed With: patient  Outcome Measures     Row Name 04/03/20 1100             How much help from another is currently needed...    Putting on and taking off regular lower body clothing?  2  -RD      Bathing (including washing, rinsing, and drying)  3  -RD      Toileting (which includes using toilet bed pan or urinal)  2  -RD      Putting on and taking off regular upper body clothing  3  -RD      Taking care of personal grooming (such as brushing teeth)  3  -RD      Eating meals  3  -RD      AM-PAC 6 Clicks Score (OT)  16  -RD         Functional Assessment    Outcome Measure Options  AM-PAC 6 Clicks Daily Activity (OT)  -RD        User Key  (r) = Recorded By, (t) = Taken By, (c) = Cosigned By    Initials Name Provider Type    RD Ernestina Munson OT Occupational Therapist           Time Calculation:   Time Calculation- OT     Row Name 04/03/20 1114             Time Calculation- OT    OT Start Time  0834  -RD      OT Stop Time  0913  -RD      OT Time Calculation  (min)  39 min  -RD      Total Timed Code Minutes- OT  39 minute(s)  -RD      OT Received On  04/03/20  -MELODY        User Key  (r) = Recorded By, (t) = Taken By, (c) = Cosigned By    Initials Name Provider Type    Ernestina Dsouza OT Occupational Therapist        Therapy Charges for Today     Code Description Service Date Service Provider Modifiers Qty    [id number] HC OT SELF CARE/MGMT/TRAIN EA 15 MIN 4/3/2020 Ernestina Munson OT GO 2    [id number]  OT THERAPEUTIC ACT EA 15 MIN 4/3/2020 Ernestina Munson OT GO 1               Ernestina Munson OT  4/3/2020

## 2020-04-06 NOTE — PROGRESS NOTES
Case Management Discharge Note      Final Note: Patient Dc'd to [facility]    Provided Post Acute Provider List?: Yes  Post Acute Provider List: Nursing Home  Provided Post Acute Provider Quality & Resource List?: Yes  Post Acute Provider Quality and Resource List: Nursing Home  Delivered To: Patient  Method of Delivery: Telephone    Destination - Selection Complete      Service Provider Request Status Selected Services Address Phone Number Fax Number    [facility] Selected Skilled Nursing [address] [phone]       Vera Pinto RN 4/1/2020 1412    2nd choice, has wound vac              Transportation Services  Private: Car    Final Discharge Disposition Code: 03 - skilled nursing facility (SNF)

## 2020-04-13 ENCOUNTER — OFFICE VISIT (OUTPATIENT)
Dept: WOUND CARE | Facility: HOSPITAL | Age: 75
End: 2020-04-13

## 2020-04-13 PROCEDURE — G0463 HOSPITAL OUTPT CLINIC VISIT: HCPCS

## 2020-04-20 ENCOUNTER — OFFICE VISIT (OUTPATIENT)
Dept: WOUND CARE | Facility: HOSPITAL | Age: 75
End: 2020-04-20

## 2020-04-20 ENCOUNTER — TRANSCRIBE ORDERS (OUTPATIENT)
Dept: ADMINISTRATIVE | Facility: HOSPITAL | Age: 75
End: 2020-04-20

## 2020-04-20 DIAGNOSIS — I70.238 ATHEROSCLEROSIS OF NATIVE ARTERIES OF RIGHT LEG WITH ULCERATION OF OTHER PART OF LOWER LEG (HCC): ICD-10-CM

## 2020-04-20 PROCEDURE — 97606 NEG PRS WND THER DME>50 SQCM: CPT

## 2020-04-21 ENCOUNTER — TELEPHONE (OUTPATIENT)
Dept: FAMILY MEDICINE CLINIC | Facility: CLINIC | Age: 75
End: 2020-04-21

## 2020-04-21 NOTE — TELEPHONE ENCOUNTER
SCARLET FROM [facility]  STATED THAT PT WILL BE GETTING RELEASED FROM [facility] TODAY. KENNY REQUESTED TO HAVE ORDERS CALLED IN TO RESUME PT CARE WITH [facility]  AND FOR THE ORDERS TO INCLUDE THEM TO ACCEPTS ORDERS FROM [facility] WOUND CARE AS WELL.    PLEASE ADVISE [phone]

## 2020-04-23 ENCOUNTER — TELEMEDICINE (OUTPATIENT)
Dept: FAMILY MEDICINE CLINIC | Facility: CLINIC | Age: 75
End: 2020-04-23

## 2020-04-23 ENCOUNTER — HOSPITAL ENCOUNTER (OUTPATIENT)
Dept: CARDIOLOGY | Facility: HOSPITAL | Age: 75
End: 2020-04-23

## 2020-04-23 ENCOUNTER — APPOINTMENT (OUTPATIENT)
Dept: ULTRASOUND IMAGING | Facility: HOSPITAL | Age: 75
End: 2020-04-23

## 2020-04-23 DIAGNOSIS — N18.30 CKD (CHRONIC KIDNEY DISEASE) STAGE 3, GFR 30-59 ML/MIN (HCC): ICD-10-CM

## 2020-04-23 DIAGNOSIS — S81.801A WOUND OF RIGHT LOWER EXTREMITY, INITIAL ENCOUNTER: ICD-10-CM

## 2020-04-23 DIAGNOSIS — E11.59 TYPE 2 DIABETES MELLITUS WITH OTHER CIRCULATORY COMPLICATION, WITHOUT LONG-TERM CURRENT USE OF INSULIN (HCC): Primary | ICD-10-CM

## 2020-04-23 PROCEDURE — 99214 OFFICE O/P EST MOD 30 MIN: CPT | Performed by: FAMILY MEDICINE

## 2020-04-27 ENCOUNTER — OFFICE VISIT (OUTPATIENT)
Dept: WOUND CARE | Facility: HOSPITAL | Age: 75
End: 2020-04-27

## 2020-04-27 PROCEDURE — 97606 NEG PRS WND THER DME>50 SQCM: CPT

## 2020-04-28 ENCOUNTER — HOSPITAL ENCOUNTER (OUTPATIENT)
Dept: CARDIOLOGY | Facility: HOSPITAL | Age: 75
Discharge: HOME | End: 2020-04-28
Admitting: NURSE PRACTITIONER

## 2020-04-28 DIAGNOSIS — I70.238 ATHEROSCLEROSIS OF NATIVE ARTERIES OF RIGHT LEG WITH ULCERATION OF OTHER PART OF LOWER LEG (HCC): ICD-10-CM

## 2020-04-28 LAB
BH CV LOWER ARTERIAL LEFT 2ND DIGIT SYS MAX: 126 MMHG
BH CV LOWER ARTERIAL LEFT ABI RATIO: 0.94
BH CV LOWER ARTERIAL LEFT DORSALIS PEDIS SYS MAX: 148 MMHG
BH CV LOWER ARTERIAL LEFT POST TIBIAL SYS MAX: 142 MMHG
BH CV LOWER ARTERIAL LEFT TBI RATIO: 0.8
BH CV LOWER ARTERIAL RIGHT 2ND DIGIT SYS MAX: 128 MMHG
BH CV LOWER ARTERIAL RIGHT ABI RATIO: 0.91
BH CV LOWER ARTERIAL RIGHT DORSALIS PEDIS SYS MAX: 143 MMHG
BH CV LOWER ARTERIAL RIGHT POST TIBIAL SYS MAX: 158 MMHG
BH CV LOWER ARTERIAL RIGHT TBI RATIO: 0.81
UPPER ARTERIAL LEFT ARM BRACHIAL SYS MAX: 158 MMHG
UPPER ARTERIAL RIGHT ARM BRACHIAL SYS MAX: 150 MMHG

## 2020-04-28 PROCEDURE — 93923 UPR/LXTR ART STDY 3+ LVLS: CPT

## 2020-05-04 ENCOUNTER — OFFICE VISIT (OUTPATIENT)
Dept: WOUND CARE | Facility: HOSPITAL | Age: 75
End: 2020-05-04

## 2020-05-04 PROCEDURE — 97606 NEG PRS WND THER DME>50 SQCM: CPT

## 2020-05-06 PROBLEM — N17.9 ACUTE KIDNEY INJURY (HCC): Status: RESOLVED | Noted: 2020-03-26 | Resolved: 2020-05-06

## 2020-05-06 RX ORDER — POTASSIUM CHLORIDE 1500 MG/1
TABLET, EXTENDED RELEASE ORAL
COMMUNITY
Start: 2020-04-23 | End: 2020-05-22

## 2020-05-11 ENCOUNTER — OFFICE VISIT (OUTPATIENT)
Dept: WOUND CARE | Facility: HOSPITAL | Age: 75
End: 2020-05-11

## 2020-05-11 PROCEDURE — 97606 NEG PRS WND THER DME>50 SQCM: CPT

## 2020-05-13 ENCOUNTER — TELEPHONE (OUTPATIENT)
Dept: FAMILY MEDICINE CLINIC | Facility: CLINIC | Age: 75
End: 2020-05-13

## 2020-05-13 NOTE — TELEPHONE ENCOUNTER
Pt called and stated that he thought Dr. Red was changing his diabetes medication but [facility] has not received anything     Please advise   [phone]

## 2020-05-18 ENCOUNTER — OFFICE VISIT (OUTPATIENT)
Dept: WOUND CARE | Facility: HOSPITAL | Age: 75
End: 2020-05-18

## 2020-05-18 PROCEDURE — G0463 HOSPITAL OUTPT CLINIC VISIT: HCPCS

## 2020-05-22 RX ORDER — CLOPIDOGREL BISULFATE 75 MG/1
TABLET ORAL
Qty: 90 TABLET | Refills: 1 | Status: SHIPPED | OUTPATIENT
Start: 2020-05-22 | End: 2020-08-05 | Stop reason: SDUPTHER

## 2020-05-22 RX ORDER — AMLODIPINE BESYLATE 10 MG/1
TABLET ORAL
Qty: 90 TABLET | Refills: 1 | Status: SHIPPED | OUTPATIENT
Start: 2020-05-22 | End: 2020-08-05 | Stop reason: SDUPTHER

## 2020-05-22 RX ORDER — INSULIN LISPRO 100 [IU]/ML
INJECTION, SOLUTION INTRAVENOUS; SUBCUTANEOUS
Qty: 15 PEN | Refills: 2 | Status: SHIPPED | OUTPATIENT
Start: 2020-05-22 | End: 2020-05-28

## 2020-05-22 RX ORDER — GLIPIZIDE 5 MG/1
TABLET ORAL
Qty: 180 TABLET | Refills: 1 | Status: SHIPPED | OUTPATIENT
Start: 2020-05-22 | End: 2020-08-05 | Stop reason: SDUPTHER

## 2020-05-22 RX ORDER — ATORVASTATIN CALCIUM 20 MG/1
TABLET, FILM COATED ORAL
Qty: 90 TABLET | Refills: 1 | Status: SHIPPED | OUTPATIENT
Start: 2020-05-22 | End: 2020-08-05 | Stop reason: SDUPTHER

## 2020-05-22 RX ORDER — HYDRALAZINE HYDROCHLORIDE 50 MG/1
TABLET, FILM COATED ORAL
Qty: 90 TABLET | Refills: 1 | Status: SHIPPED | OUTPATIENT
Start: 2020-05-22 | End: 2020-08-05 | Stop reason: SDUPTHER

## 2020-05-22 RX ORDER — BUMETANIDE 2 MG/1
TABLET ORAL
Qty: 90 TABLET | Refills: 1 | Status: SHIPPED | OUTPATIENT
Start: 2020-05-22 | End: 2020-08-05 | Stop reason: SDUPTHER

## 2020-05-22 RX ORDER — POTASSIUM CHLORIDE 1500 MG/1
TABLET, EXTENDED RELEASE ORAL
Qty: 90 TABLET | Refills: 1 | Status: SHIPPED | OUTPATIENT
Start: 2020-05-22 | End: 2020-08-05 | Stop reason: SDUPTHER

## 2020-05-28 ENCOUNTER — OFFICE VISIT (OUTPATIENT)
Dept: FAMILY MEDICINE CLINIC | Facility: CLINIC | Age: 75
End: 2020-05-28

## 2020-05-28 DIAGNOSIS — M54.31 RIGHT SIDED SCIATICA: ICD-10-CM

## 2020-05-28 DIAGNOSIS — N18.30 CKD (CHRONIC KIDNEY DISEASE) STAGE 3, GFR 30-59 ML/MIN (HCC): ICD-10-CM

## 2020-05-28 DIAGNOSIS — E11.59 TYPE 2 DIABETES MELLITUS WITH OTHER CIRCULATORY COMPLICATION, WITHOUT LONG-TERM CURRENT USE OF INSULIN (HCC): Primary | ICD-10-CM

## 2020-05-28 PROCEDURE — 99443 PR PHYS/QHP TELEPHONE EVALUATION 21-30 MIN: CPT | Performed by: FAMILY MEDICINE

## 2020-05-29 ENCOUNTER — APPOINTMENT (OUTPATIENT)
Dept: WOUND CARE | Facility: HOSPITAL | Age: 75
End: 2020-05-29

## 2020-06-01 ENCOUNTER — OFFICE VISIT (OUTPATIENT)
Dept: WOUND CARE | Facility: HOSPITAL | Age: 75
End: 2020-06-01

## 2020-06-01 PROCEDURE — 97602 WOUND(S) CARE NON-SELECTIVE: CPT

## 2020-06-05 ENCOUNTER — TELEPHONE (OUTPATIENT)
Dept: FAMILY MEDICINE CLINIC | Facility: CLINIC | Age: 75
End: 2020-06-05

## 2020-06-05 NOTE — TELEPHONE ENCOUNTER
Message Summary        [facility] call to advise us of a seemingly grown red area on patient. It began in left lower leg. Its recently seems to be growing in size. HH noted that its not hot or painful. Please  return call and advise.    Best Callback Number: [phone]    Patient Notes: n/a

## 2020-06-05 NOTE — TELEPHONE ENCOUNTER
Called HH nurse and left message. Called pt and left message. See he will have appointment with wound care on 6/8 so I sent that nurse a message through [facility] about this development. Please call wound care and make sure Natalie Burns checks her staff messages to see my note. Thanks.     Call wound care 6/8/20 before pt appt. Thanks.

## 2020-06-08 ENCOUNTER — OFFICE VISIT (OUTPATIENT)
Dept: WOUND CARE | Facility: HOSPITAL | Age: 75
End: 2020-06-08

## 2020-06-08 ENCOUNTER — LAB REQUISITION (OUTPATIENT)
Dept: LAB | Facility: HOSPITAL | Age: 75
End: 2020-06-08

## 2020-06-08 DIAGNOSIS — E11.42 TYPE 2 DIABETES MELLITUS WITH DIABETIC POLYNEUROPATHY (HCC): ICD-10-CM

## 2020-06-08 PROCEDURE — 87015 SPECIMEN INFECT AGNT CONCNTJ: CPT | Performed by: NURSE PRACTITIONER

## 2020-06-08 PROCEDURE — 97602 WOUND(S) CARE NON-SELECTIVE: CPT

## 2020-06-08 PROCEDURE — 87070 CULTURE OTHR SPECIMN AEROBIC: CPT | Performed by: NURSE PRACTITIONER

## 2020-06-08 PROCEDURE — 87147 CULTURE TYPE IMMUNOLOGIC: CPT | Performed by: NURSE PRACTITIONER

## 2020-06-08 PROCEDURE — 87075 CULTR BACTERIA EXCEPT BLOOD: CPT | Performed by: NURSE PRACTITIONER

## 2020-06-08 PROCEDURE — 87205 SMEAR GRAM STAIN: CPT | Performed by: NURSE PRACTITIONER

## 2020-06-08 PROCEDURE — 87186 SC STD MICRODIL/AGAR DIL: CPT | Performed by: NURSE PRACTITIONER

## 2020-06-12 ENCOUNTER — TELEPHONE (OUTPATIENT)
Dept: FAMILY MEDICINE CLINIC | Facility: CLINIC | Age: 75
End: 2020-06-12

## 2020-06-12 LAB
BACTERIA SPEC AEROBE CULT: ABNORMAL
BACTERIA SPEC AEROBE CULT: ABNORMAL
GRAM STN SPEC: ABNORMAL
GRAM STN SPEC: ABNORMAL

## 2020-06-12 NOTE — TELEPHONE ENCOUNTER
----- Message from Karen Red MD sent at 6/11/2020  9:53 PM EDT -----  Regarding: RE: medication cost  Wow that is really great! Will you please let the pt know?  Thanks.   ----- Message -----  From: Sharita Singleton MA  Sent: 6/11/2020   8:26 AM EDT  To: Karen Red MD  Subject: RE: medication cost                              I checked on [facility] and he can get a 3 month supply for $35.84 at [facility], or since it was sent to [facility] he can get it for 55.35.  All he would need to do is print a coupon I believe.   ----- Message -----  From: Karen Red MD  Sent: 6/10/2020   9:24 PM EDT  To: Sharita Singleton MA  Subject: medication cost                                  Hello  Can you please call this pt's pharmacy and check in on the price of bumex? He reported it was $117 for three months and this is much higher than the diuretic he had been on before this. Is there anything we can do to help with the price? Thanks.

## 2020-06-12 NOTE — TELEPHONE ENCOUNTER
I spoke to patient and informed him of the [facility] prices and he said that when it is time to renew the prescription in 3 months he would like us to print him a coupon and he will pick it up. He also paid for the prescription, so he currently does not need the prescription.  He has an appointment in August and will remind of as that time to print the coupon.

## 2020-06-13 LAB — BACTERIA SPEC ANAEROBE CULT: NORMAL

## 2020-06-15 ENCOUNTER — OFFICE VISIT (OUTPATIENT)
Dept: WOUND CARE | Facility: HOSPITAL | Age: 75
End: 2020-06-15

## 2020-06-15 PROCEDURE — G0463 HOSPITAL OUTPT CLINIC VISIT: HCPCS

## 2020-06-18 ENCOUNTER — TELEPHONE (OUTPATIENT)
Dept: FAMILY MEDICINE CLINIC | Facility: CLINIC | Age: 75
End: 2020-06-18

## 2020-06-18 NOTE — TELEPHONE ENCOUNTER
ROSEMARIE FROM [facility] CALLED AND STATED SHE WOULD LIKE FOR HER NAME AND NUMBER TO BE LEFT WITH THE PATIENT AT HIS NEXT VISIT. SHE STATES THAT SHE HAS BEEN UNSUCCESSFUL AT REACHING THE PATIENT. PLEASE ADVISE.     ROSEMARIE CALL BACK [phone]

## 2020-06-24 ENCOUNTER — OFFICE VISIT (OUTPATIENT)
Dept: WOUND CARE | Facility: HOSPITAL | Age: 75
End: 2020-06-24

## 2020-07-01 ENCOUNTER — APPOINTMENT (OUTPATIENT)
Dept: WOUND CARE | Facility: HOSPITAL | Age: 75
End: 2020-07-01

## 2020-07-08 ENCOUNTER — OFFICE VISIT (OUTPATIENT)
Dept: WOUND CARE | Facility: HOSPITAL | Age: 75
End: 2020-07-08

## 2020-07-15 ENCOUNTER — OFFICE VISIT (OUTPATIENT)
Dept: WOUND CARE | Facility: HOSPITAL | Age: 75
End: 2020-07-15

## 2020-07-22 ENCOUNTER — OFFICE VISIT (OUTPATIENT)
Dept: WOUND CARE | Facility: HOSPITAL | Age: 75
End: 2020-07-22

## 2020-08-04 ENCOUNTER — OFFICE VISIT (OUTPATIENT)
Dept: FAMILY MEDICINE CLINIC | Facility: CLINIC | Age: 75
End: 2020-08-04

## 2020-08-04 VITALS
OXYGEN SATURATION: 98 % | RESPIRATION RATE: 18 BRPM | TEMPERATURE: 97.3 F | WEIGHT: 295 LBS | HEIGHT: 73 IN | SYSTOLIC BLOOD PRESSURE: 142 MMHG | BODY MASS INDEX: 39.1 KG/M2 | HEART RATE: 75 BPM | DIASTOLIC BLOOD PRESSURE: 60 MMHG

## 2020-08-04 DIAGNOSIS — I10 ESSENTIAL HYPERTENSION: ICD-10-CM

## 2020-08-04 DIAGNOSIS — E78.5 HYPERLIPIDEMIA, UNSPECIFIED HYPERLIPIDEMIA TYPE: ICD-10-CM

## 2020-08-04 DIAGNOSIS — E11.59 TYPE 2 DIABETES MELLITUS WITH OTHER CIRCULATORY COMPLICATION, WITHOUT LONG-TERM CURRENT USE OF INSULIN (HCC): Primary | ICD-10-CM

## 2020-08-04 DIAGNOSIS — N18.30 CKD (CHRONIC KIDNEY DISEASE) STAGE 3, GFR 30-59 ML/MIN (HCC): ICD-10-CM

## 2020-08-04 PROCEDURE — G0439 PPPS, SUBSEQ VISIT: HCPCS | Performed by: FAMILY MEDICINE

## 2020-08-04 PROCEDURE — 99213 OFFICE O/P EST LOW 20 MIN: CPT | Performed by: FAMILY MEDICINE
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
## 2020-08-05 ENCOUNTER — OFFICE VISIT (OUTPATIENT)
Dept: WOUND CARE | Facility: HOSPITAL | Age: 75
End: 2020-08-05

## 2020-08-05 LAB
25(OH)D3+25(OH)D2 SERPL-MCNC: 33.4 NG/ML (ref 30–100)
ALBUMIN SERPL-MCNC: 4.3 G/DL (ref 3.5–5.2)
ALBUMIN/GLOB SERPL: 1.4 G/DL
ALP SERPL-CCNC: 129 U/L (ref 39–117)
ALT SERPL-CCNC: 10 U/L (ref 1–41)
AST SERPL-CCNC: 11 U/L (ref 1–40)
BASOPHILS # BLD AUTO: 0.03 10*3/MM3 (ref 0–0.2)
BASOPHILS NFR BLD AUTO: 0.4 % (ref 0–1.5)
BILIRUB SERPL-MCNC: 0.3 MG/DL (ref 0–1.2)
BUN SERPL-MCNC: 67 MG/DL (ref 8–23)
BUN/CREAT SERPL: 44.7 (ref 7–25)
CALCIUM SERPL-MCNC: 9.3 MG/DL (ref 8.6–10.5)
CHLORIDE SERPL-SCNC: 102 MMOL/L (ref 98–107)
CHOLEST SERPL-MCNC: 142 MG/DL (ref 0–200)
CO2 SERPL-SCNC: 28.1 MMOL/L (ref 22–29)
CREAT SERPL-MCNC: 1.5 MG/DL (ref 0.76–1.27)
CREAT UR-MCNC: 23.3 MG/DL
EOSINOPHIL # BLD AUTO: 0.23 10*3/MM3 (ref 0–0.4)
EOSINOPHIL NFR BLD AUTO: 3.4 % (ref 0.3–6.2)
ERYTHROCYTE [DISTWIDTH] IN BLOOD BY AUTOMATED COUNT: 13.3 % (ref 12.3–15.4)
GLOBULIN SER CALC-MCNC: 3.1 GM/DL
GLUCOSE SERPL-MCNC: 194 MG/DL (ref 65–99)
HBA1C MFR BLD: 7.8 % (ref 4.8–5.6)
HCT VFR BLD AUTO: 39.7 % (ref 37.5–51)
HDLC SERPL-MCNC: 45 MG/DL (ref 40–60)
HGB BLD-MCNC: 13.2 G/DL (ref 13–17.7)
IMM GRANULOCYTES # BLD AUTO: 0.03 10*3/MM3 (ref 0–0.05)
IMM GRANULOCYTES NFR BLD AUTO: 0.4 % (ref 0–0.5)
LDLC SERPL CALC-MCNC: 73 MG/DL (ref 0–100)
LYMPHOCYTES # BLD AUTO: 1.43 10*3/MM3 (ref 0.7–3.1)
LYMPHOCYTES NFR BLD AUTO: 20.8 % (ref 19.6–45.3)
MCH RBC QN AUTO: 29.8 PG (ref 26.6–33)
MCHC RBC AUTO-ENTMCNC: 33.2 G/DL (ref 31.5–35.7)
MCV RBC AUTO: 89.6 FL (ref 79–97)
MONOCYTES # BLD AUTO: 0.44 10*3/MM3 (ref 0.1–0.9)
MONOCYTES NFR BLD AUTO: 6.4 % (ref 5–12)
NEUTROPHILS # BLD AUTO: 4.7 10*3/MM3 (ref 1.7–7)
NEUTROPHILS NFR BLD AUTO: 68.6 % (ref 42.7–76)
NRBC BLD AUTO-RTO: 0 /100 WBC (ref 0–0.2)
PHOSPHATE SERPL-MCNC: 3.6 MG/DL (ref 2.5–4.5)
PLATELET # BLD AUTO: 220 10*3/MM3 (ref 140–450)
POTASSIUM SERPL-SCNC: 4.7 MMOL/L (ref 3.5–5.2)
PROT SERPL-MCNC: 7.4 G/DL (ref 6–8.5)
PROT UR-MCNC: 41 MG/DL
PROT/CREAT UR: 1759.7 MG/G CREA (ref 0–200)
RBC # BLD AUTO: 4.43 10*6/MM3 (ref 4.14–5.8)
SODIUM SERPL-SCNC: 142 MMOL/L (ref 136–145)
TRIGL SERPL-MCNC: 120 MG/DL (ref 0–150)
VLDLC SERPL CALC-MCNC: 24 MG/DL
WBC # BLD AUTO: 6.86 10*3/MM3 (ref 3.4–10.8)

## 2020-08-05 PROCEDURE — G0463 HOSPITAL OUTPT CLINIC VISIT: HCPCS

## 2020-08-05 RX ORDER — POTASSIUM CHLORIDE 20 MEQ/1
20 TABLET, EXTENDED RELEASE ORAL DAILY
Qty: 90 TABLET | Refills: 1 | Status: SHIPPED | OUTPATIENT
Start: 2020-08-05 | End: 2021-01-27

## 2020-08-05 RX ORDER — GLIPIZIDE 5 MG/1
5 TABLET ORAL 2 TIMES DAILY
Qty: 180 TABLET | Refills: 1 | Status: SHIPPED | OUTPATIENT
Start: 2020-08-05 | End: 2021-07-13 | Stop reason: SDUPTHER

## 2020-08-05 RX ORDER — ATORVASTATIN CALCIUM 20 MG/1
20 TABLET, FILM COATED ORAL DAILY
Qty: 90 TABLET | Refills: 1 | Status: SHIPPED | OUTPATIENT
Start: 2020-08-05 | End: 2021-01-27

## 2020-08-05 RX ORDER — AMLODIPINE BESYLATE 10 MG/1
10 TABLET ORAL DAILY
Qty: 90 TABLET | Refills: 1 | Status: SHIPPED | OUTPATIENT
Start: 2020-08-05 | End: 2021-01-27

## 2020-08-05 RX ORDER — CLOPIDOGREL BISULFATE 75 MG/1
75 TABLET ORAL DAILY
Qty: 90 TABLET | Refills: 1 | Status: SHIPPED | OUTPATIENT
Start: 2020-08-05 | End: 2021-01-27

## 2020-08-05 RX ORDER — BUMETANIDE 2 MG/1
2 TABLET ORAL DAILY
Qty: 90 TABLET | Refills: 1 | Status: SHIPPED | OUTPATIENT
Start: 2020-08-05 | End: 2021-01-27

## 2020-08-05 RX ORDER — HYDRALAZINE HYDROCHLORIDE 50 MG/1
50 TABLET, FILM COATED ORAL EVERY 8 HOURS
Qty: 90 TABLET | Refills: 1 | Status: SHIPPED | OUTPATIENT
Start: 2020-08-05 | End: 2020-11-06 | Stop reason: SDUPTHER

## 2020-08-05 NOTE — TELEPHONE ENCOUNTER
.    Caller: Rock Maciel Jr.    Relationship: Self    Best call back number: [phone] (H)  Medication needed:   atorvastatin (LIPITOR) 20 MG tablet                        KLOR-CON 20 MEQ CR tablet  amLODIPine (NORVASC) 10 MG                           bumetanide (BUMEX) 2 MG tablet   hydrALAZINE (APRESOLINE) 50 MG taBL              glipizide (GLUCOTROL) 5 MG tablet   metoprolol tartrate (LOPRESSOR) 25 MG tablet            clopidogrel (PLAVIX) 75 MG tablet   linagliptin (TRADJENTA) 5 MG tablet tablet      PATIENT REQUESTS 90 DAY SUPPLY ON ALL MEDICATIONS PLEASE....    What is the patient's preferred pharmacy:    [facility] - [place], [place] - [facility] [address] [facility] - [phone] [facility] [phone] FX   P: [phone]

## 2020-08-12 ENCOUNTER — OFFICE VISIT (OUTPATIENT)
Dept: WOUND CARE | Facility: HOSPITAL | Age: 75
End: 2020-08-12

## 2020-08-26 ENCOUNTER — OFFICE VISIT (OUTPATIENT)
Dept: WOUND CARE | Facility: HOSPITAL | Age: 75
End: 2020-08-26

## 2020-09-07 ENCOUNTER — LAB REQUISITION (OUTPATIENT)
Dept: LAB | Facility: HOSPITAL | Age: 75
End: 2020-09-07

## 2020-09-07 DIAGNOSIS — Z00.00 ENCOUNTER FOR GENERAL ADULT MEDICAL EXAMINATION WITHOUT ABNORMAL FINDINGS: ICD-10-CM

## 2020-09-07 LAB
BACTERIA UR QL AUTO: ABNORMAL /HPF
BILIRUB UR QL STRIP: NEGATIVE
CLARITY UR: ABNORMAL
COLOR UR: YELLOW
GLUCOSE UR STRIP-MCNC: NEGATIVE MG/DL
HGB UR QL STRIP.AUTO: ABNORMAL
HYALINE CASTS UR QL AUTO: ABNORMAL /LPF
KETONES UR QL STRIP: NEGATIVE
LEUKOCYTE ESTERASE UR QL STRIP.AUTO: ABNORMAL
NITRITE UR QL STRIP: NEGATIVE
PH UR STRIP.AUTO: <=5 [PH] (ref 5–8)
PROT UR QL STRIP: ABNORMAL
RBC # UR: ABNORMAL /HPF
REF LAB TEST METHOD: ABNORMAL
SP GR UR STRIP: 1.01 (ref 1–1.03)
SQUAMOUS #/AREA URNS HPF: ABNORMAL /HPF
UROBILINOGEN UR QL STRIP: ABNORMAL
WBC UR QL AUTO: ABNORMAL /HPF

## 2020-09-07 PROCEDURE — 81001 URINALYSIS AUTO W/SCOPE: CPT | Performed by: FAMILY MEDICINE

## 2020-09-08 ENCOUNTER — TELEPHONE (OUTPATIENT)
Dept: FAMILY MEDICINE CLINIC | Facility: CLINIC | Age: 75
End: 2020-09-08

## 2020-09-08 RX ORDER — CIPROFLOXACIN 500 MG/1
500 TABLET, FILM COATED ORAL 2 TIMES DAILY
Qty: 14 TABLET | Refills: 0 | Status: SHIPPED | OUTPATIENT
Start: 2020-09-08 | End: 2021-02-08

## 2020-09-09 ENCOUNTER — OFFICE VISIT (OUTPATIENT)
Dept: WOUND CARE | Facility: HOSPITAL | Age: 75
End: 2020-09-09

## 2020-09-16 ENCOUNTER — LAB REQUISITION (OUTPATIENT)
Dept: LAB | Facility: HOSPITAL | Age: 75
End: 2020-09-16

## 2020-09-16 DIAGNOSIS — Z00.00 ENCOUNTER FOR GENERAL ADULT MEDICAL EXAMINATION WITHOUT ABNORMAL FINDINGS: ICD-10-CM

## 2020-09-16 LAB
ALBUMIN SERPL-MCNC: 3.9 G/DL (ref 3.5–5.2)
ALBUMIN/GLOB SERPL: 1 G/DL
ALP SERPL-CCNC: 117 U/L (ref 39–117)
ALT SERPL W P-5'-P-CCNC: 10 U/L (ref 1–41)
ANION GAP SERPL CALCULATED.3IONS-SCNC: 7.8 MMOL/L (ref 5–15)
AST SERPL-CCNC: 11 U/L (ref 1–40)
BASOPHILS # BLD AUTO: 0.02 10*3/MM3 (ref 0–0.2)
BASOPHILS NFR BLD AUTO: 0.3 % (ref 0–1.5)
BILIRUB SERPL-MCNC: 0.4 MG/DL (ref 0–1.2)
BUN SERPL-MCNC: 54 MG/DL (ref 8–23)
BUN/CREAT SERPL: 33.8 (ref 7–25)
CALCIUM SPEC-SCNC: 9.2 MG/DL (ref 8.6–10.5)
CHLORIDE SERPL-SCNC: 99 MMOL/L (ref 98–107)
CO2 SERPL-SCNC: 29.2 MMOL/L (ref 22–29)
CREAT SERPL-MCNC: 1.6 MG/DL (ref 0.76–1.27)
DEPRECATED RDW RBC AUTO: 41.9 FL (ref 37–54)
EOSINOPHIL # BLD AUTO: 0.37 10*3/MM3 (ref 0–0.4)
EOSINOPHIL NFR BLD AUTO: 6 % (ref 0.3–6.2)
ERYTHROCYTE [DISTWIDTH] IN BLOOD BY AUTOMATED COUNT: 13.1 % (ref 12.3–15.4)
GFR SERPL CREATININE-BSD FRML MDRD: 42 ML/MIN/1.73
GLOBULIN UR ELPH-MCNC: 3.8 GM/DL
GLUCOSE SERPL-MCNC: 256 MG/DL (ref 65–99)
HBA1C MFR BLD: 7.5 % (ref 4.8–5.6)
HCT VFR BLD AUTO: 36.9 % (ref 37.5–51)
HGB BLD-MCNC: 12.3 G/DL (ref 13–17.7)
IMM GRANULOCYTES # BLD AUTO: 0.02 10*3/MM3 (ref 0–0.05)
IMM GRANULOCYTES NFR BLD AUTO: 0.3 % (ref 0–0.5)
LYMPHOCYTES # BLD AUTO: 1.14 10*3/MM3 (ref 0.7–3.1)
LYMPHOCYTES NFR BLD AUTO: 18.5 % (ref 19.6–45.3)
MCH RBC QN AUTO: 29.7 PG (ref 26.6–33)
MCHC RBC AUTO-ENTMCNC: 33.3 G/DL (ref 31.5–35.7)
MCV RBC AUTO: 89.1 FL (ref 79–97)
MONOCYTES # BLD AUTO: 0.39 10*3/MM3 (ref 0.1–0.9)
MONOCYTES NFR BLD AUTO: 6.3 % (ref 5–12)
NEUTROPHILS NFR BLD AUTO: 4.23 10*3/MM3 (ref 1.7–7)
NEUTROPHILS NFR BLD AUTO: 68.6 % (ref 42.7–76)
NRBC BLD AUTO-RTO: 0 /100 WBC (ref 0–0.2)
PLATELET # BLD AUTO: 229 10*3/MM3 (ref 140–450)
PMV BLD AUTO: 9.7 FL (ref 6–12)
POTASSIUM SERPL-SCNC: 3.9 MMOL/L (ref 3.5–5.2)
PROT SERPL-MCNC: 7.7 G/DL (ref 6–8.5)
RBC # BLD AUTO: 4.14 10*6/MM3 (ref 4.14–5.8)
SODIUM SERPL-SCNC: 136 MMOL/L (ref 136–145)
WBC # BLD AUTO: 6.17 10*3/MM3 (ref 3.4–10.8)

## 2020-09-16 PROCEDURE — 80053 COMPREHEN METABOLIC PANEL: CPT | Performed by: FAMILY MEDICINE

## 2020-09-16 PROCEDURE — 85025 COMPLETE CBC W/AUTO DIFF WBC: CPT | Performed by: FAMILY MEDICINE

## 2020-09-16 PROCEDURE — 83036 HEMOGLOBIN GLYCOSYLATED A1C: CPT | Performed by: FAMILY MEDICINE

## 2020-09-23 ENCOUNTER — OFFICE VISIT (OUTPATIENT)
Dept: WOUND CARE | Facility: HOSPITAL | Age: 75
End: 2020-09-23

## 2020-10-07 ENCOUNTER — OFFICE VISIT (OUTPATIENT)
Dept: WOUND CARE | Facility: HOSPITAL | Age: 75
End: 2020-10-07

## 2020-10-16 ENCOUNTER — TELEPHONE (OUTPATIENT)
Dept: FAMILY MEDICINE CLINIC | Facility: CLINIC | Age: 75
End: 2020-10-16

## 2020-10-16 ENCOUNTER — LAB REQUISITION (OUTPATIENT)
Dept: LAB | Facility: HOSPITAL | Age: 75
End: 2020-10-16

## 2020-10-16 DIAGNOSIS — Z00.00 ENCOUNTER FOR GENERAL ADULT MEDICAL EXAMINATION WITHOUT ABNORMAL FINDINGS: ICD-10-CM

## 2020-10-16 PROCEDURE — 87086 URINE CULTURE/COLONY COUNT: CPT | Performed by: FAMILY MEDICINE

## 2020-10-16 PROCEDURE — 81001 URINALYSIS AUTO W/SCOPE: CPT | Performed by: FAMILY MEDICINE

## 2020-10-16 PROCEDURE — 87088 URINE BACTERIA CULTURE: CPT | Performed by: FAMILY MEDICINE

## 2020-10-16 PROCEDURE — 87186 SC STD MICRODIL/AGAR DIL: CPT | Performed by: FAMILY MEDICINE

## 2020-10-16 NOTE — TELEPHONE ENCOUNTER
Home health is calling asking for verbal orders to run a urine culture on patient for UTI. They state he is having frequency and cloudiness in urine.

## 2020-10-18 LAB — BACTERIA SPEC AEROBE CULT: ABNORMAL

## 2020-10-19 RX ORDER — SULFAMETHOXAZOLE AND TRIMETHOPRIM 800; 160 MG/1; MG/1
1 TABLET ORAL 2 TIMES DAILY
Qty: 14 TABLET | Refills: 0 | Status: SHIPPED | OUTPATIENT
Start: 2020-10-19 | End: 2020-10-20 | Stop reason: SDUPTHER

## 2020-10-20 ENCOUNTER — OFFICE VISIT (OUTPATIENT)
Dept: SLEEP MEDICINE | Facility: HOSPITAL | Age: 75
End: 2020-10-20

## 2020-10-20 ENCOUNTER — TELEPHONE (OUTPATIENT)
Dept: FAMILY MEDICINE CLINIC | Facility: CLINIC | Age: 75
End: 2020-10-20

## 2020-10-20 VITALS
DIASTOLIC BLOOD PRESSURE: 64 MMHG | HEART RATE: 67 BPM | SYSTOLIC BLOOD PRESSURE: 143 MMHG | BODY MASS INDEX: 40.42 KG/M2 | OXYGEN SATURATION: 97 % | HEIGHT: 73 IN | WEIGHT: 305 LBS

## 2020-10-20 DIAGNOSIS — G47.33 OSA (OBSTRUCTIVE SLEEP APNEA): Primary | ICD-10-CM

## 2020-10-20 DIAGNOSIS — I50.32 CHRONIC DIASTOLIC (CONGESTIVE) HEART FAILURE (HCC): ICD-10-CM

## 2020-10-20 PROCEDURE — G0463 HOSPITAL OUTPT CLINIC VISIT: HCPCS

## 2020-10-20 RX ORDER — SULFAMETHOXAZOLE AND TRIMETHOPRIM 800; 160 MG/1; MG/1
1 TABLET ORAL 2 TIMES DAILY
Qty: 14 TABLET | Refills: 0 | Status: SHIPPED | OUTPATIENT
Start: 2020-10-20 | End: 2021-02-08

## 2020-10-20 NOTE — PROGRESS NOTES
" Rock Maciel Jr.  1945  75 y.o. male   [id number]   DATE OF SERVICE:  10/20/2020       HISTORY OF PRESENT ILLNESS: Mr. Rock Maciel Jr. is a 75 y.o. right-handed  male has history of hypertension, diabetes mellitus type 2, hyperlipidemia, coronary artery disease and status post CABG and CHF, aortic valve stenosis and status post aortic valve replacement and carotid stenosis and is status post bilateral carotid endarterectomies in the past and is here for the management of obstructive sleep apnea syndrome.      The patient has history of severe obstructive sleep apnea syndrome, hypertension, diabetes mellitus, and had aortic valve replacement with open heart surgery on July 23, 2018 and had surgery on his left quadriceps tendon in May 2019.    The patient has severe obstructive sleep apnea syndrome with apnea-hypopnea index AHI of 60 per sleep hour, minimum SpO2 of 66% with severe hypoxemic desaturations and percent of sleep time.     I have reviewed compliance data.  His compliance data indicate excellent compliance with 100% usage for more than 4 hours with an average usage of 8 hours and 25 minutes with the mean CPAP pressure of 6.7 cms of water and residual AHI 2.1. He is compliant and benefiting from it.  CPAP pressures ranged from 6.7cm to 10.5 cm of water.    Sleep schedule 10 PM to 6 AM and gets about 7-8 hours of sleep.  Sleep latency 30-40 minutes.  Mathis Sleepiness Scale score 0.    Review of systems significant for nasal congestion.    PMH, PSH, Medications, allergies, FH, SH are reviewed and updated in the chart.   PHYSICAL EXAMINATION:  Vitals:    10/20/20 0827   BP: 143/64   Pulse: 67   SpO2: 97%   Weight: (!) 138 kg (305 lb)   Height: 185.4 cm (73\")   Body mass index is 40.24 kg/m². neck collar size 21 inches.   HEENT: Normal.   NECK: Supple. No bruits.   CARDIAC: Normal.   LUNGS: Clear to auscultation.   EXTREMITIES: No edema.     IMPRESSION: Patient with severe obstructive " sleep apnea syndrome successfully treated with auto CPAP therapy and is compliant and benefiting from it.     1. SHASTA (obstructive sleep apnea)    2. Chronic diastolic (congestive) heart failure (CMS/HCC)    3. Body mass index (BMI)40.0-44.9, adult (CMS/HCC)      RECOMMENDATIONS: We will continue auto CPAP therapy.  We'll request for repeating a replacing his auto CPAP machine.   Followup in 1 year.     Andrés Fuentes M.D.  10/20/2020

## 2020-10-21 ENCOUNTER — TELEPHONE (OUTPATIENT)
Dept: FAMILY MEDICINE CLINIC | Facility: CLINIC | Age: 75
End: 2020-10-21

## 2020-10-21 ENCOUNTER — OFFICE VISIT (OUTPATIENT)
Dept: WOUND CARE | Facility: HOSPITAL | Age: 75
End: 2020-10-21

## 2020-10-21 PROCEDURE — 17250 CHEM CAUT OF GRANLTJ TISSUE: CPT

## 2020-10-21 NOTE — TELEPHONE ENCOUNTER
PATIENT CALLED TO CHECK ON THE STATUS OF THE PRESCRIPTION THAT HIS HOME HEALTH NURSE REQUESTED FOR HIM TO TREAT THE E.COLI THAT THEY DETECTED FROM A URINE SPECIMEN THAT THEY TOOK FROM HIM. THE PATIENT STATED THAT HE DOES NOT KNOW THE SPECIFIC NAME OF THIS MEDICATION.    THE PATIENT STATED THAT HE CALLED YESTERDAY (10/2020) AND SPOKE WITH SOMEONE (HE BELIEVES IT WAS [place]), AND THEY ADVISED HIM THAT THE PRESCRIPTION OF CONCERN WAS SENT TO THE PHARMACY AT [facility] (PHONE NUMBER: [phone]) INSTEAD OF HIS LOCAL PHARMACY, THE PHARMACY AT [facility] AT [address] [facility] (PHONE NUMBER: [phone]). THE PATIENT STATED THAT THE PERSON THAT HE SPOKE WITH YESTERDAY (10/20/20) ADVISED HIM THAT THE PRESCRIPTION WOULD BE SENT TO THE PHARMACY AT [facility], BUT THE PATIENT STATED THAT HE JUST SPOKE WITH SOMEONE AT THE PHARMACY AT [facility] THIS MORNING (10/21/20) AND THEY INFORMED HIM THAT THEY HAD STILL NOT RECEIVED ANY PRESCRIPTIONS FOR HIM.    PLEASE CALL THE PATIENT [phone] WHEN THIS MESSAGE HAS BEEN RECEIVED AND ADVISE HIM REGARDING THE STATUS OF THIS PRESCRIPTION. THE PATIENT STATED THAT A DETAILED MESSAGE CAN BE LEFT ON HIS VOICEMAIL IF HE IS UNABLE TO ANSWER WHEN CALLED.

## 2020-10-21 NOTE — TELEPHONE ENCOUNTER
Prescription was sent to [facility]/pharmacy #[id number] - [place], [place] - [address] AT IN THE [place].

## 2020-11-02 ENCOUNTER — LAB REQUISITION (OUTPATIENT)
Dept: LAB | Facility: HOSPITAL | Age: 75
End: 2020-11-02

## 2020-11-02 DIAGNOSIS — Z00.00 ENCOUNTER FOR GENERAL ADULT MEDICAL EXAMINATION WITHOUT ABNORMAL FINDINGS: ICD-10-CM

## 2020-11-02 LAB
BACTERIA UR QL AUTO: ABNORMAL /HPF
BILIRUB UR QL STRIP: NEGATIVE
CLARITY UR: CLEAR
COLOR UR: YELLOW
GLUCOSE UR STRIP-MCNC: NEGATIVE MG/DL
HGB UR QL STRIP.AUTO: NEGATIVE
HYALINE CASTS UR QL AUTO: ABNORMAL /LPF
KETONES UR QL STRIP: NEGATIVE
LEUKOCYTE ESTERASE UR QL STRIP.AUTO: ABNORMAL
NITRITE UR QL STRIP: NEGATIVE
PH UR STRIP.AUTO: <=5 [PH] (ref 5–8)
PROT UR QL STRIP: ABNORMAL
RBC # UR: ABNORMAL /HPF
REF LAB TEST METHOD: ABNORMAL
SP GR UR STRIP: 1.01 (ref 1–1.03)
SQUAMOUS #/AREA URNS HPF: ABNORMAL /HPF
UROBILINOGEN UR QL STRIP: ABNORMAL
WBC UR QL AUTO: ABNORMAL /HPF

## 2020-11-02 PROCEDURE — 87086 URINE CULTURE/COLONY COUNT: CPT | Performed by: FAMILY MEDICINE

## 2020-11-02 PROCEDURE — 81001 URINALYSIS AUTO W/SCOPE: CPT | Performed by: FAMILY MEDICINE

## 2020-11-03 LAB — BACTERIA SPEC AEROBE CULT: NORMAL

## 2020-11-04 ENCOUNTER — OFFICE VISIT (OUTPATIENT)
Dept: WOUND CARE | Facility: HOSPITAL | Age: 75
End: 2020-11-04

## 2020-11-06 RX ORDER — HYDRALAZINE HYDROCHLORIDE 50 MG/1
50 TABLET, FILM COATED ORAL EVERY 8 HOURS
Qty: 90 TABLET | Refills: 0 | Status: SHIPPED | OUTPATIENT
Start: 2020-11-06 | End: 2020-11-16 | Stop reason: SDUPTHER

## 2020-11-16 ENCOUNTER — TELEPHONE (OUTPATIENT)
Dept: FAMILY MEDICINE CLINIC | Facility: CLINIC | Age: 75
End: 2020-11-16

## 2020-11-16 RX ORDER — HYDRALAZINE HYDROCHLORIDE 50 MG/1
50 TABLET, FILM COATED ORAL EVERY 8 HOURS
Qty: 270 TABLET | Refills: 1 | Status: SHIPPED | OUTPATIENT
Start: 2020-11-16 | End: 2021-07-13 | Stop reason: SDUPTHER

## 2020-11-16 NOTE — TELEPHONE ENCOUNTER
PATIENT STATES: that his hydrALAZINE (APRESOLINE) 50 MG tablet was filled with 90 pills and it should be 270 pills. He calling to see why is it 90 please advise     PATIENT CAN BE REACHED ON:[phone]    PHARMACY PREFERRED:[facility] - [place], [place] - [facility] [address] - [phone]  - [phone] FX

## 2020-11-18 ENCOUNTER — OFFICE VISIT (OUTPATIENT)
Dept: WOUND CARE | Facility: HOSPITAL | Age: 75
End: 2020-11-18

## 2020-11-18 PROCEDURE — 17250 CHEM CAUT OF GRANLTJ TISSUE: CPT

## 2020-12-02 ENCOUNTER — OFFICE VISIT (OUTPATIENT)
Dept: WOUND CARE | Facility: HOSPITAL | Age: 75
End: 2020-12-02

## 2020-12-02 PROCEDURE — 17250 CHEM CAUT OF GRANLTJ TISSUE: CPT

## 2020-12-16 ENCOUNTER — OFFICE VISIT (OUTPATIENT)
Dept: WOUND CARE | Facility: HOSPITAL | Age: 75
End: 2020-12-16

## 2020-12-16 PROCEDURE — 17250 CHEM CAUT OF GRANLTJ TISSUE: CPT

## 2020-12-28 ENCOUNTER — TRANSCRIBE ORDERS (OUTPATIENT)
Dept: ADMINISTRATIVE | Facility: HOSPITAL | Age: 75
End: 2020-12-28

## 2020-12-28 DIAGNOSIS — N28.9 KIDNEY LESION, NATIVE, LEFT: Primary | ICD-10-CM

## 2020-12-30 ENCOUNTER — OFFICE VISIT (OUTPATIENT)
Dept: WOUND CARE | Facility: HOSPITAL | Age: 75
End: 2020-12-30

## 2020-12-30 PROCEDURE — 17250 CHEM CAUT OF GRANLTJ TISSUE: CPT

## 2021-01-05 ENCOUNTER — HOSPITAL ENCOUNTER (OUTPATIENT)
Dept: CT IMAGING | Facility: HOSPITAL | Age: 76
Discharge: HOME | End: 2021-01-05
Admitting: INTERNAL MEDICINE

## 2021-01-05 DIAGNOSIS — N28.9 KIDNEY LESION, NATIVE, LEFT: ICD-10-CM

## 2021-01-05 PROCEDURE — 74176 CT ABD & PELVIS W/O CONTRAST: CPT

## 2021-01-13 ENCOUNTER — OFFICE VISIT (OUTPATIENT)
Dept: WOUND CARE | Facility: HOSPITAL | Age: 76
End: 2021-01-13

## 2021-01-20 ENCOUNTER — OFFICE VISIT (OUTPATIENT)
Dept: WOUND CARE | Facility: HOSPITAL | Age: 76
End: 2021-01-20

## 2021-01-27 RX ORDER — ATORVASTATIN CALCIUM 20 MG/1
TABLET, FILM COATED ORAL
Qty: 90 TABLET | Refills: 1 | Status: SHIPPED | OUTPATIENT
Start: 2021-01-27 | End: 2021-07-13 | Stop reason: SDUPTHER

## 2021-01-27 RX ORDER — POTASSIUM CHLORIDE 1500 MG/1
TABLET, EXTENDED RELEASE ORAL
Qty: 90 TABLET | Refills: 1 | Status: SHIPPED | OUTPATIENT
Start: 2021-01-27 | End: 2021-07-13 | Stop reason: SDUPTHER

## 2021-01-27 RX ORDER — CLOPIDOGREL BISULFATE 75 MG/1
TABLET ORAL
Qty: 90 TABLET | Refills: 1 | Status: SHIPPED | OUTPATIENT
Start: 2021-01-27 | End: 2021-07-13 | Stop reason: SDUPTHER

## 2021-01-27 RX ORDER — BUMETANIDE 2 MG/1
TABLET ORAL
Qty: 90 TABLET | Refills: 1 | Status: SHIPPED | OUTPATIENT
Start: 2021-01-27 | End: 2021-07-13 | Stop reason: SDUPTHER

## 2021-01-27 RX ORDER — LINAGLIPTIN 5 MG/1
TABLET, FILM COATED ORAL
Qty: 90 TABLET | Refills: 1 | Status: SHIPPED | OUTPATIENT
Start: 2021-01-27 | End: 2021-07-13 | Stop reason: SDUPTHER

## 2021-01-27 RX ORDER — AMLODIPINE BESYLATE 10 MG/1
TABLET ORAL
Qty: 90 TABLET | Refills: 1 | Status: SHIPPED | OUTPATIENT
Start: 2021-01-27 | End: 2021-07-13 | Stop reason: SDUPTHER

## 2021-02-03 ENCOUNTER — OFFICE VISIT (OUTPATIENT)
Dept: WOUND CARE | Facility: HOSPITAL | Age: 76
End: 2021-02-03

## 2021-02-08 ENCOUNTER — OFFICE VISIT (OUTPATIENT)
Dept: FAMILY MEDICINE CLINIC | Facility: CLINIC | Age: 76
End: 2021-02-08

## 2021-02-08 VITALS
RESPIRATION RATE: 18 BRPM | HEIGHT: 73 IN | BODY MASS INDEX: 41.75 KG/M2 | DIASTOLIC BLOOD PRESSURE: 62 MMHG | WEIGHT: 315 LBS | HEART RATE: 95 BPM | OXYGEN SATURATION: 91 % | SYSTOLIC BLOOD PRESSURE: 152 MMHG | TEMPERATURE: 96.9 F

## 2021-02-08 DIAGNOSIS — G89.29 CHRONIC RIGHT-SIDED LOW BACK PAIN WITHOUT SCIATICA: ICD-10-CM

## 2021-02-08 DIAGNOSIS — M54.50 CHRONIC RIGHT-SIDED LOW BACK PAIN WITHOUT SCIATICA: ICD-10-CM

## 2021-02-08 DIAGNOSIS — I10 ESSENTIAL HYPERTENSION: ICD-10-CM

## 2021-02-08 DIAGNOSIS — E78.5 HYPERLIPIDEMIA, UNSPECIFIED HYPERLIPIDEMIA TYPE: ICD-10-CM

## 2021-02-08 DIAGNOSIS — N18.30 STAGE 3 CHRONIC KIDNEY DISEASE, UNSPECIFIED WHETHER STAGE 3A OR 3B CKD (HCC): ICD-10-CM

## 2021-02-08 DIAGNOSIS — E11.59 TYPE 2 DIABETES MELLITUS WITH OTHER CIRCULATORY COMPLICATION, WITHOUT LONG-TERM CURRENT USE OF INSULIN (HCC): Primary | ICD-10-CM

## 2021-02-08 DIAGNOSIS — Z11.59 ENCOUNTER FOR HEPATITIS C SCREENING TEST FOR LOW RISK PATIENT: ICD-10-CM

## 2021-02-08 PROBLEM — K59.00 CONSTIPATION: Status: RESOLVED | Noted: 2020-03-30 | Resolved: 2021-02-08

## 2021-02-08 PROCEDURE — 99214 OFFICE O/P EST MOD 30 MIN: CPT | Performed by: FAMILY MEDICINE

## 2021-02-08 NOTE — PROGRESS NOTES
"Chief Complaint  Diabetes (6 U)    Subjective          Rock Maciel Jr. presents to [facility] for   History of Present Illness  DM  Says sugars have been running around 150 then sometimes up to 180.   Says he is not getting a lot of exercise.   He has some pain in the right posterior hip.   Had a fall in the [place], fell back when getting into wheelchair.   He does not feel it when he sits but then stands  Fell back in June.  Using the wheel chair until recently.     HTN  His home health team comes during the week his BP is usually around 130/60 at home. Says his BP tends to go up at the doctor's office. .     Objective   Vital Signs:   /62 (BP Location: Left arm, Patient Position: Sitting, Cuff Size: Adult)   Pulse 95   Temp 96.9 °F (36.1 °C) (Infrared)   Resp 18   Ht 185.4 cm (73\")   Wt (!) 148 kg (326 lb 14.4 oz)   SpO2 91%   BMI 43.13 kg/m²     Physical Exam  Vitals signs reviewed.   Constitutional:       General: He is not in acute distress.  Eyes:      General: No scleral icterus.        Right eye: No discharge.         Left eye: No discharge.      Conjunctiva/sclera: Conjunctivae normal.   Neck:      Musculoskeletal: Neck supple. No muscular tenderness.      Vascular: No carotid bruit.   Cardiovascular:      Rate and Rhythm: Normal rate and regular rhythm.      Pulses:           Dorsalis pedis pulses are 0 on the right side and 0 on the left side.        Posterior tibial pulses are 0 on the right side and 0 on the left side.      Heart sounds: Normal heart sounds. No murmur.   Pulmonary:      Effort: Pulmonary effort is normal. No respiratory distress.      Breath sounds: Normal breath sounds. No wheezing.   Musculoskeletal:         General: Deformity present. No tenderness.      Right lower leg: Edema present.      Left lower leg: Edema present.      Comments: Left foot with medial plantar deformity   Feet:      Right foot:      Amputation: (4th and 5th digits " amputated)     Skin integrity: Skin integrity normal.      Toenail Condition: Right toenails are abnormally thick. Fungal disease present.     Left foot:      Amputation: (great toe amputated)     Skin integrity: Skin integrity normal.      Toenail Condition: Left toenails are abnormally thick. Fungal disease present.  Lymphadenopathy:      Cervical: No cervical adenopathy.   Neurological:      Mental Status: He is oriented to person, place, and time.      Motor: No abnormal muscle tone.   Psychiatric:         Behavior: Behavior normal.        Result Review :                 Assessment and Plan    Problem List Items Addressed This Visit        Cardiac and Vasculature    Essential hypertension    Hyperlipidemia    Relevant Orders    Lipid Panel (Completed)       Endocrine and Metabolic    Type 2 diabetes mellitus with circulatory disorder, without long-term current use of insulin (CMS/[facility]) - Primary    Relevant Orders    Hemoglobin A1c (Completed)    CBC & Differential (Completed)    Comprehensive Metabolic Panel (Completed)       Genitourinary and Reproductive     CKD (chronic kidney disease) stage 3, GFR 30-59 ml/min (CMS/HCC)    Relevant Orders    CBC & Differential (Completed)    Comprehensive Metabolic Panel (Completed)      Other Visit Diagnoses     Encounter for hepatitis C screening test for low risk patient        Relevant Orders    Hepatitis C Antibody (Completed)    Chronic right-sided low back pain without sciatica        Relevant Orders    Ambulatory Referral to Home Health          Follow Up   No follow-ups on file.  Patient was given instructions and counseling regarding his condition or for health maintenance advice. Please see specific information pulled into the AVS if appropriate.     Due for labs today. He is still working with home wound care. Says the wound is very small on the RLE pretibial surface. Almost improved. It is all wrapped up today and he would rather not take it down. They are doing  treatments routinely.     We will check in on his DM.   He is a lot less active related to LBP on the right when he stands and moves around. Has been using the wheelchair predominantly for getting around for months. Concern for weakness. We are going to get him started with PT with his home health team this week and if they release him will transition him to outpatient PT. Will target the back and strengthening and balance.   Hope the movement will help the sugars as well.     His BP elevated today but reports good readings with home health, no changes in his medications.     Check in on lipids today. His is on low dose of high potency statin.

## 2021-02-09 LAB
ALBUMIN SERPL-MCNC: 4.2 G/DL (ref 3.5–5.2)
ALBUMIN/GLOB SERPL: 1.1 G/DL
ALP SERPL-CCNC: 154 U/L (ref 39–117)
ALT SERPL-CCNC: 13 U/L (ref 1–41)
AST SERPL-CCNC: 17 U/L (ref 1–40)
BASOPHILS # BLD AUTO: 0.03 10*3/MM3 (ref 0–0.2)
BASOPHILS NFR BLD AUTO: 0.4 % (ref 0–1.5)
BILIRUB SERPL-MCNC: 0.4 MG/DL (ref 0–1.2)
BUN SERPL-MCNC: 39 MG/DL (ref 8–23)
BUN/CREAT SERPL: 26.4 (ref 7–25)
CALCIUM SERPL-MCNC: 9.5 MG/DL (ref 8.6–10.5)
CHLORIDE SERPL-SCNC: 99 MMOL/L (ref 98–107)
CHOLEST SERPL-MCNC: 172 MG/DL (ref 0–200)
CO2 SERPL-SCNC: 30.6 MMOL/L (ref 22–29)
CREAT SERPL-MCNC: 1.48 MG/DL (ref 0.76–1.27)
EOSINOPHIL # BLD AUTO: 0.27 10*3/MM3 (ref 0–0.4)
EOSINOPHIL NFR BLD AUTO: 3.4 % (ref 0.3–6.2)
ERYTHROCYTE [DISTWIDTH] IN BLOOD BY AUTOMATED COUNT: 13.4 % (ref 12.3–15.4)
GLOBULIN SER CALC-MCNC: 3.7 GM/DL
GLUCOSE SERPL-MCNC: 217 MG/DL (ref 65–99)
HBA1C MFR BLD: 8.1 % (ref 4.8–5.6)
HCT VFR BLD AUTO: 43 % (ref 37.5–51)
HCV AB S/CO SERPL IA: <0.1 S/CO RATIO (ref 0–0.9)
HDLC SERPL-MCNC: 47 MG/DL (ref 40–60)
HGB BLD-MCNC: 13.7 G/DL (ref 13–17.7)
IMM GRANULOCYTES # BLD AUTO: 0.03 10*3/MM3 (ref 0–0.05)
IMM GRANULOCYTES NFR BLD AUTO: 0.4 % (ref 0–0.5)
LDLC SERPL CALC-MCNC: 92 MG/DL (ref 0–100)
LYMPHOCYTES # BLD AUTO: 1.97 10*3/MM3 (ref 0.7–3.1)
LYMPHOCYTES NFR BLD AUTO: 24.9 % (ref 19.6–45.3)
MCH RBC QN AUTO: 29.1 PG (ref 26.6–33)
MCHC RBC AUTO-ENTMCNC: 31.9 G/DL (ref 31.5–35.7)
MCV RBC AUTO: 91.5 FL (ref 79–97)
MONOCYTES # BLD AUTO: 0.37 10*3/MM3 (ref 0.1–0.9)
MONOCYTES NFR BLD AUTO: 4.7 % (ref 5–12)
NEUTROPHILS # BLD AUTO: 5.25 10*3/MM3 (ref 1.7–7)
NEUTROPHILS NFR BLD AUTO: 66.2 % (ref 42.7–76)
NRBC BLD AUTO-RTO: 0 /100 WBC (ref 0–0.2)
PLATELET # BLD AUTO: 217 10*3/MM3 (ref 140–450)
POTASSIUM SERPL-SCNC: 4.1 MMOL/L (ref 3.5–5.2)
PROT SERPL-MCNC: 7.9 G/DL (ref 6–8.5)
RBC # BLD AUTO: 4.7 10*6/MM3 (ref 4.14–5.8)
SODIUM SERPL-SCNC: 138 MMOL/L (ref 136–145)
TRIGL SERPL-MCNC: 193 MG/DL (ref 0–150)
VLDLC SERPL CALC-MCNC: 33 MG/DL (ref 5–40)
WBC # BLD AUTO: 7.92 10*3/MM3 (ref 3.4–10.8)

## 2021-02-12 PROBLEM — S98.132A AMPUTATED TOE OF LEFT FOOT: Status: ACTIVE | Noted: 2021-02-12

## 2021-02-17 ENCOUNTER — APPOINTMENT (OUTPATIENT)
Dept: WOUND CARE | Facility: HOSPITAL | Age: 76
End: 2021-02-17

## 2021-02-24 ENCOUNTER — OFFICE VISIT (OUTPATIENT)
Dept: WOUND CARE | Facility: HOSPITAL | Age: 76
End: 2021-02-24

## 2021-03-01 ENCOUNTER — TELEPHONE (OUTPATIENT)
Dept: FAMILY MEDICINE CLINIC | Facility: CLINIC | Age: 76
End: 2021-03-01

## 2021-03-01 NOTE — TELEPHONE ENCOUNTER
Spoke with this nurse and gave her verbal orders for U/A with C/S. Voiced understanding. Verbal orders given from Neda.

## 2021-03-01 NOTE — TELEPHONE ENCOUNTER
Caller: MIGNON    Relationship: [facility]    Best call back number: [phone]    What orders are you requesting (i.e. lab or imaging): URINE ANALYSIS    In what timeframe would the patient need to come in: ASAP    Where will you receive your lab/imaging services: HOME    Additional notes: MIGNON FROM [facility] CALLED AND SAID THE PATIENT STATED HIS URINE WAS A LITTLE CLOUDED, THINKS HE MAY HAVE A UTI, WOULD LIKE A RINE ANALYSIS

## 2021-03-02 ENCOUNTER — LAB REQUISITION (OUTPATIENT)
Dept: LAB | Facility: HOSPITAL | Age: 76
End: 2021-03-02

## 2021-03-02 DIAGNOSIS — Z00.00 ENCOUNTER FOR GENERAL ADULT MEDICAL EXAMINATION WITHOUT ABNORMAL FINDINGS: ICD-10-CM

## 2021-03-02 DIAGNOSIS — Z23 IMMUNIZATION DUE: ICD-10-CM

## 2021-03-02 LAB
BACTERIA UR QL AUTO: NORMAL /HPF
BILIRUB UR QL STRIP: NEGATIVE
CLARITY UR: CLEAR
COLOR UR: YELLOW
GLUCOSE UR STRIP-MCNC: NEGATIVE MG/DL
HGB UR QL STRIP.AUTO: NEGATIVE
HYALINE CASTS UR QL AUTO: NORMAL /LPF
KETONES UR QL STRIP: NEGATIVE
LEUKOCYTE ESTERASE UR QL STRIP.AUTO: NEGATIVE
NITRITE UR QL STRIP: NEGATIVE
PH UR STRIP.AUTO: 6 [PH] (ref 5–8)
PROT UR QL STRIP: ABNORMAL
RBC # UR: NORMAL /HPF
REF LAB TEST METHOD: NORMAL
SP GR UR STRIP: 1.01 (ref 1–1.03)
SQUAMOUS #/AREA URNS HPF: NORMAL /HPF
UROBILINOGEN UR QL STRIP: ABNORMAL
WBC UR QL AUTO: NORMAL /HPF

## 2021-03-02 PROCEDURE — 81001 URINALYSIS AUTO W/SCOPE: CPT | Performed by: FAMILY MEDICINE

## 2021-03-03 ENCOUNTER — OFFICE VISIT (OUTPATIENT)
Dept: WOUND CARE | Facility: HOSPITAL | Age: 76
End: 2021-03-03

## 2021-03-03 ENCOUNTER — TRANSCRIBE ORDERS (OUTPATIENT)
Dept: ADMINISTRATIVE | Facility: HOSPITAL | Age: 76
End: 2021-03-03

## 2021-03-03 DIAGNOSIS — I83.018 VARICOSE VEINS OF R LOW EXTREM W ULCER OTH PART OF LOWER LEG (HCC): Primary | ICD-10-CM

## 2021-03-03 DIAGNOSIS — L97.819 VARICOSE VEINS OF R LOW EXTREM W ULCER OTH PART OF LOWER LEG (HCC): Primary | ICD-10-CM

## 2021-03-08 ENCOUNTER — APPOINTMENT (OUTPATIENT)
Dept: CARDIOLOGY | Facility: HOSPITAL | Age: 76
End: 2021-03-08

## 2021-03-08 ENCOUNTER — TELEPHONE (OUTPATIENT)
Dept: FAMILY MEDICINE CLINIC | Facility: CLINIC | Age: 76
End: 2021-03-08

## 2021-03-08 NOTE — TELEPHONE ENCOUNTER
Called this patient per dana order to check on symptoms. Left detailed VM asking this patient to call the office back and speak to this nurse.     HUB transfer this patient to the office to speak with this nurse.

## 2021-03-09 ENCOUNTER — TELEPHONE (OUTPATIENT)
Dept: FAMILY MEDICINE CLINIC | Facility: CLINIC | Age: 76
End: 2021-03-09

## 2021-03-09 NOTE — TELEPHONE ENCOUNTER
Called this patient back and received no answer. Asked him to contact office back and ask for this nurse.       HUB transfer this patient to the office to speak with this nurse.

## 2021-03-10 ENCOUNTER — OFFICE VISIT (OUTPATIENT)
Dept: WOUND CARE | Facility: HOSPITAL | Age: 76
End: 2021-03-10

## 2021-03-17 ENCOUNTER — OFFICE VISIT (OUTPATIENT)
Dept: WOUND CARE | Facility: HOSPITAL | Age: 76
End: 2021-03-17

## 2021-03-17 ENCOUNTER — HOSPITAL ENCOUNTER (OUTPATIENT)
Dept: CARDIOLOGY | Facility: HOSPITAL | Age: 76
Discharge: HOME | End: 2021-03-17
Admitting: NURSE PRACTITIONER

## 2021-03-17 DIAGNOSIS — I83.018 VARICOSE VEINS OF R LOW EXTREM W ULCER OTH PART OF LOWER LEG (HCC): ICD-10-CM

## 2021-03-17 DIAGNOSIS — L97.819 VARICOSE VEINS OF R LOW EXTREM W ULCER OTH PART OF LOWER LEG (HCC): ICD-10-CM

## 2021-03-17 LAB
BH CV LOW VAS LEFT GREATER SAPH BK VESSEL: 1
BH CV LOW VAS LEFT LESSER SAPH VESSEL: 1
BH CV LOW VAS RIGHT COMMON FEMORAL SPONT: 1
BH CV LOW VAS RIGHT GREATER SAPH BK VESSEL: 1
BH CV LOW VAS RIGHT LESSER SAPH VESSEL: 1
BH CV LOW VAS RIGHT POPLITEAL SPONT: 1
BH CV LOWER VASCULAR LEFT COMMON FEMORAL AUGMENT: NORMAL
BH CV LOWER VASCULAR LEFT COMMON FEMORAL COMPETENT: NORMAL
BH CV LOWER VASCULAR LEFT COMMON FEMORAL COMPRESS: NORMAL
BH CV LOWER VASCULAR LEFT COMMON FEMORAL PHASIC: NORMAL
BH CV LOWER VASCULAR LEFT COMMON FEMORAL SPONT: NORMAL
BH CV LOWER VASCULAR LEFT DISTAL FEMORAL COMPRESS: NORMAL
BH CV LOWER VASCULAR LEFT GASTRONEMIUS COMPRESS: NORMAL
BH CV LOWER VASCULAR LEFT GREATER SAPH AK COMPRESS: NORMAL
BH CV LOWER VASCULAR LEFT GREATER SAPH BK COMPETENT: NORMAL
BH CV LOWER VASCULAR LEFT GREATER SAPH BK COMPRESS: NORMAL
BH CV LOWER VASCULAR LEFT LESSER SAPH COMPRESS: NORMAL
BH CV LOWER VASCULAR LEFT LESSER SAPH THROMBUS: NORMAL
BH CV LOWER VASCULAR LEFT MID FEMORAL AUGMENT: NORMAL
BH CV LOWER VASCULAR LEFT MID FEMORAL COMPETENT: NORMAL
BH CV LOWER VASCULAR LEFT MID FEMORAL COMPRESS: NORMAL
BH CV LOWER VASCULAR LEFT MID FEMORAL PHASIC: NORMAL
BH CV LOWER VASCULAR LEFT MID FEMORAL SPONT: NORMAL
BH CV LOWER VASCULAR LEFT PERONEAL COMPRESS: NORMAL
BH CV LOWER VASCULAR LEFT POPLITEAL AUGMENT: NORMAL
BH CV LOWER VASCULAR LEFT POPLITEAL COMPETENT: NORMAL
BH CV LOWER VASCULAR LEFT POPLITEAL COMPRESS: NORMAL
BH CV LOWER VASCULAR LEFT POPLITEAL PHASIC: NORMAL
BH CV LOWER VASCULAR LEFT POPLITEAL SPONT: NORMAL
BH CV LOWER VASCULAR LEFT POSTERIOR TIBIAL COMPRESS: NORMAL
BH CV LOWER VASCULAR LEFT PROFUNDA FEMORAL COMPRESS: NORMAL
BH CV LOWER VASCULAR LEFT PROXIMAL FEMORAL COMPRESS: NORMAL
BH CV LOWER VASCULAR LEFT SAPHENOFEMORAL JUNCTION COMPRESS: NORMAL
BH CV LOWER VASCULAR RIGHT COMMON FEMORAL AUGMENT: NORMAL
BH CV LOWER VASCULAR RIGHT COMMON FEMORAL COMPETENT: NORMAL
BH CV LOWER VASCULAR RIGHT COMMON FEMORAL COMPRESS: NORMAL
BH CV LOWER VASCULAR RIGHT COMMON FEMORAL PHASIC: NORMAL
BH CV LOWER VASCULAR RIGHT COMMON FEMORAL SPONT: NORMAL
BH CV LOWER VASCULAR RIGHT DISTAL FEMORAL COMPRESS: NORMAL
BH CV LOWER VASCULAR RIGHT GASTRONEMIUS COMPRESS: NORMAL
BH CV LOWER VASCULAR RIGHT GREATER SAPH AK COMPRESS: NORMAL
BH CV LOWER VASCULAR RIGHT GREATER SAPH BK COMPETENT: NORMAL
BH CV LOWER VASCULAR RIGHT GREATER SAPH BK COMPRESS: NORMAL
BH CV LOWER VASCULAR RIGHT LESSER SAPH COMPRESS: NORMAL
BH CV LOWER VASCULAR RIGHT LESSER SAPH THROMBUS: NORMAL
BH CV LOWER VASCULAR RIGHT MID FEMORAL AUGMENT: NORMAL
BH CV LOWER VASCULAR RIGHT MID FEMORAL COMPETENT: NORMAL
BH CV LOWER VASCULAR RIGHT MID FEMORAL COMPRESS: NORMAL
BH CV LOWER VASCULAR RIGHT MID FEMORAL PHASIC: NORMAL
BH CV LOWER VASCULAR RIGHT MID FEMORAL SPONT: NORMAL
BH CV LOWER VASCULAR RIGHT PERONEAL COMPRESS: NORMAL
BH CV LOWER VASCULAR RIGHT POPLITEAL AUGMENT: NORMAL
BH CV LOWER VASCULAR RIGHT POPLITEAL COMPETENT: NORMAL
BH CV LOWER VASCULAR RIGHT POPLITEAL COMPRESS: NORMAL
BH CV LOWER VASCULAR RIGHT POPLITEAL PHASIC: NORMAL
BH CV LOWER VASCULAR RIGHT POPLITEAL SPONT: NORMAL
BH CV LOWER VASCULAR RIGHT POSTERIOR TIBIAL COMPRESS: NORMAL
BH CV LOWER VASCULAR RIGHT PROFUNDA FEMORAL COMPRESS: NORMAL
BH CV LOWER VASCULAR RIGHT PROXIMAL FEMORAL COMPRESS: NORMAL
BH CV LOWER VASCULAR RIGHT SAPHENOFEMORAL JUNCTION COMPRESS: NORMAL

## 2021-03-17 PROCEDURE — 93970 EXTREMITY STUDY: CPT

## 2021-03-25 ENCOUNTER — OFFICE VISIT (OUTPATIENT)
Dept: WOUND CARE | Facility: HOSPITAL | Age: 76
End: 2021-03-25

## 2021-03-31 ENCOUNTER — OFFICE VISIT (OUTPATIENT)
Dept: WOUND CARE | Facility: HOSPITAL | Age: 76
End: 2021-03-31

## 2021-04-07 ENCOUNTER — OFFICE VISIT (OUTPATIENT)
Dept: WOUND CARE | Facility: HOSPITAL | Age: 76
End: 2021-04-07

## 2021-04-14 ENCOUNTER — OFFICE VISIT (OUTPATIENT)
Dept: WOUND CARE | Facility: HOSPITAL | Age: 76
End: 2021-04-14

## 2021-04-14 PROCEDURE — G0463 HOSPITAL OUTPT CLINIC VISIT: HCPCS

## 2021-07-13 RX ORDER — CLOPIDOGREL BISULFATE 75 MG/1
75 TABLET ORAL DAILY
Qty: 90 TABLET | Refills: 1 | Status: SHIPPED | OUTPATIENT
Start: 2021-07-13 | End: 2022-01-17

## 2021-07-13 RX ORDER — ATORVASTATIN CALCIUM 20 MG/1
20 TABLET, FILM COATED ORAL DAILY
Qty: 90 TABLET | Refills: 1 | Status: SHIPPED | OUTPATIENT
Start: 2021-07-13 | End: 2022-01-17

## 2021-07-13 RX ORDER — AMLODIPINE BESYLATE 10 MG/1
10 TABLET ORAL DAILY
Qty: 90 TABLET | Refills: 1 | Status: SHIPPED | OUTPATIENT
Start: 2021-07-13 | End: 2022-01-17

## 2021-07-13 RX ORDER — HYDRALAZINE HYDROCHLORIDE 50 MG/1
50 TABLET, FILM COATED ORAL EVERY 8 HOURS
Qty: 270 TABLET | Refills: 1 | Status: SHIPPED | OUTPATIENT
Start: 2021-07-13 | End: 2021-12-20

## 2021-07-13 RX ORDER — GLIPIZIDE 5 MG/1
5 TABLET ORAL 2 TIMES DAILY
Qty: 180 TABLET | Refills: 1 | Status: SHIPPED | OUTPATIENT
Start: 2021-07-13 | End: 2021-12-20

## 2021-07-13 RX ORDER — POTASSIUM CHLORIDE 20 MEQ/1
20 TABLET, EXTENDED RELEASE ORAL DAILY
Qty: 90 TABLET | Refills: 1 | Status: SHIPPED | OUTPATIENT
Start: 2021-07-13 | End: 2022-01-17

## 2021-07-13 RX ORDER — BUMETANIDE 2 MG/1
2 TABLET ORAL DAILY
Qty: 90 TABLET | Refills: 1 | Status: SHIPPED | OUTPATIENT
Start: 2021-07-13 | End: 2022-01-17

## 2021-07-13 NOTE — TELEPHONE ENCOUNTER
Caller: [facility] - [address] - [phone] [facility] [phone] FX      Medication needed:   Requested Prescriptions     Pending Prescriptions Disp Refills   • hydrALAZINE (APRESOLINE) 50 MG tablet 270 tablet 1     Sig: Take 1 tablet by mouth Every 8 (Eight) Hours.   • linagliptin (Tradjenta) 5 MG tablet tablet 90 tablet 1     Sig: Take 1 tablet by mouth Daily.   • metoprolol tartrate (LOPRESSOR) 25 MG tablet 180 tablet 1     Sig: Take 1 tablet by mouth 2 (Two) Times a Day.   • potassium chloride (KLOR-CON) 20 MEQ CR tablet 90 tablet 1     Sig: Take 1 tablet by mouth Daily.   • amLODIPine (NORVASC) 10 MG tablet 90 tablet 1     Sig: Take 1 tablet by mouth Daily.   • bumetanide (BUMEX) 2 MG tablet 90 tablet 1     Sig: Take 1 tablet by mouth Daily.   • glipizide (GLUCOTROL) 5 MG tablet 180 tablet 1     Sig: Take 1 tablet by mouth 2 (Two) Times a Day.   • clopidogrel (PLAVIX) 75 MG tablet 90 tablet 1     Sig: Take 1 tablet by mouth Daily.   • atorvastatin (LIPITOR) 20 MG tablet 90 tablet 1     Sig: Take 1 tablet by mouth Daily.       When do you need the refill by: ASAP    What is the patient's preferred pharmacy: [facility] - [address] - [phone] [facility] [phone] FX

## 2021-08-05 ENCOUNTER — TELEPHONE (OUTPATIENT)
Dept: FAMILY MEDICINE CLINIC | Facility: CLINIC | Age: 76
End: 2021-08-05

## 2021-08-05 NOTE — TELEPHONE ENCOUNTER
Caller: Rock Maciel Jr.    Relationship: Self    Best call back number: [phone]*    What is the best time to reach you: ANYTIME    Who are you requesting to speak with (clinical staff, provider,  specific staff member): LANDRY    Do you know the name of the person who called: PATIENT RETURNING LANDRY'S CALL.    What was the call regarding: SCHEDULING    Do you require a callback: YES

## 2021-08-06 NOTE — TELEPHONE ENCOUNTER
Called this patient back left . Asked him to call office back to speak with this nurse.    HUB transfer this patient to the office to speak with this nurse      OFFICE find this nurse or get Mallorie to schedule this patient.

## 2021-08-06 NOTE — TELEPHONE ENCOUNTER
Hub staff attempted to follow warm transfer process and was unsuccessful     Caller: Rock Maciel Jr.    Relationship to patient: Self    Best call back number: [phone]     Patient is needing: PATIENT RETURNED CALL TO LINDA

## 2021-08-13 ENCOUNTER — OFFICE VISIT (OUTPATIENT)
Dept: FAMILY MEDICINE CLINIC | Facility: CLINIC | Age: 76
End: 2021-08-13

## 2021-08-13 VITALS
OXYGEN SATURATION: 99 % | TEMPERATURE: 97.5 F | HEART RATE: 77 BPM | BODY MASS INDEX: 41.75 KG/M2 | HEIGHT: 73 IN | DIASTOLIC BLOOD PRESSURE: 68 MMHG | SYSTOLIC BLOOD PRESSURE: 154 MMHG | RESPIRATION RATE: 18 BRPM | WEIGHT: 315 LBS

## 2021-08-13 DIAGNOSIS — E11.59 TYPE 2 DIABETES MELLITUS WITH OTHER CIRCULATORY COMPLICATION, WITHOUT LONG-TERM CURRENT USE OF INSULIN (HCC): ICD-10-CM

## 2021-08-13 DIAGNOSIS — Z00.00 MEDICARE ANNUAL WELLNESS VISIT, SUBSEQUENT: Primary | ICD-10-CM

## 2021-08-13 PROCEDURE — G0439 PPPS, SUBSEQ VISIT: HCPCS | Performed by: NURSE PRACTITIONER

## 2021-08-13 PROCEDURE — 1160F RVW MEDS BY RX/DR IN RCRD: CPT | Performed by: NURSE PRACTITIONER

## 2021-08-13 PROCEDURE — 1170F FXNL STATUS ASSESSED: CPT | Performed by: NURSE PRACTITIONER

## 2021-08-13 NOTE — PROGRESS NOTES
The ABCs of the Annual Wellness Visit  Subsequent Medicare Wellness Visit    Chief Complaint   Patient presents with   • Medicare Wellness-subsequent       Subjective   History of Present Illness:  Rock Maciel Jr. is a 76 y.o. male who presents for a Subsequent Medicare Wellness Visit.    HEALTH RISK ASSESSMENT    Recent Hospitalizations:  No hospitalization(s) within the last year.    Current Medical Providers:  Patient Care Team:  Karen Red MD as PCP - General (Family Medicine)  Azam Banegas Jr., MD as Consulting Physician (Cardiology)  Jamil Stevens MD as Consulting Physician (Nephrology)    Smoking Status:  Social History     Tobacco Use   Smoking Status Never Smoker   Smokeless Tobacco Never Used       Alcohol Consumption:  Social History     Substance and Sexual Activity   Alcohol Use Yes    Comment: either one glass wine or one glass liquor per  day       Depression Screen:   PHQ-2/PHQ-9 Depression Screening 2/8/2021   Little interest or pleasure in doing things 0   Feeling down, depressed, or hopeless 0   Trouble falling or staying asleep, or sleeping too much -   Feeling tired or having little energy -   Poor appetite or overeating -   Feeling bad about yourself - or that you are a failure or have let yourself or your family down -   Trouble concentrating on things, such as reading the newspaper or watching television -   Moving or speaking so slowly that other people could have noticed. Or the opposite - being so fidgety or restless that you have been moving around a lot more than usual -   Thoughts that you would be better off dead, or of hurting yourself in some way -   Total Score 0   If you checked off any problems, how difficult have these problems made it for you to do your work, take care of things at home, or get along with other people? -       Fall Risk Screen:  STEADI Fall Risk Assessment was completed, and patient is at MODERATE risk for falls. Assessment completed  on:2/8/2021    Health Habits and Functional and Cognitive Screening:  Functional & Cognitive Status 8/4/2020   Do you have difficulty preparing food and eating? No   Do you have difficulty bathing yourself, getting dressed or grooming yourself? No   Do you have difficulty using the toilet? No   Do you have difficulty moving around from place to place? No   Do you have trouble with steps or getting out of a bed or a chair? No   Current Diet Low Carb Diet   Dental Exam Up to date   Eye Exam Up to date   Exercise (times per week) 3 times per week   Current Exercise Activities Include Light Weight/Kettebells   Do you need help using the phone?  No   Are you deaf or do you have serious difficulty hearing?  No   Do you need help with transportation? No   Do you need help shopping? No   Do you need help preparing meals?  No   Do you need help with housework?  No   Do you need help with laundry? No   Do you need help taking your medications? No   Do you need help managing money? No   Do you ever drive or ride in a car without wearing a seat belt? No   Have you felt unusual stress, anger or loneliness in the last month? No   Who do you live with? Alone   If you need help, do you have trouble finding someone available to you? No   Have you been bothered in the last four weeks by sexual problems? No   Do you have difficulty concentrating, remembering or making decisions? No         Does the patient have evidence of cognitive impairment? No    Asprin use counseling:Does not need ASA (and currently is not on it)    Age-appropriate Screening Schedule:  Refer to the list below for future screening recommendations based on patient's age, sex and/or medical conditions. Orders for these recommended tests are listed in the plan section. The patient has been provided with a written plan.    Health Maintenance   Topic Date Due   • URINE MICROALBUMIN  08/04/2021   • HEMOGLOBIN A1C  08/08/2021   • INFLUENZA VACCINE  10/01/2021   • LIPID  PANEL  02/08/2022   • DIABETIC EYE EXAM  04/30/2022   • TDAP/TD VACCINES (2 - Td or Tdap) 06/24/2025   • ZOSTER VACCINE  Completed          The following portions of the patient's history were reviewed and updated as appropriate: allergies, current medications, past family history, past medical history, past social history, past surgical history and problem list.    Outpatient Medications Prior to Visit   Medication Sig Dispense Refill   • acetaminophen (TYLENOL) 325 MG tablet Take 2 tablets by mouth Every 4 (Four) Hours As Needed for Mild Pain .     • amLODIPine (NORVASC) 10 MG tablet Take 1 tablet by mouth Daily. 90 tablet 1   • atorvastatin (LIPITOR) 20 MG tablet Take 1 tablet by mouth Daily. 90 tablet 1   • bumetanide (BUMEX) 2 MG tablet Take 1 tablet by mouth Daily. 90 tablet 1   • Cholecalciferol (VITAMIN D3 PO) Take 2,000 Units by mouth Daily. PT HOLDING FOR SURGERY      • clopidogrel (PLAVIX) 75 MG tablet Take 1 tablet by mouth Daily. 90 tablet 1   • glipizide (GLUCOTROL) 5 MG tablet Take 1 tablet by mouth 2 (Two) Times a Day. 180 tablet 1   • glucose blood (Accu-Chek Keshia Plus) test strip Use to test blood sugar twice daily for diabetes 100 each 0   • hydrALAZINE (APRESOLINE) 50 MG tablet Take 1 tablet by mouth Every 8 (Eight) Hours. 270 tablet 1   • linagliptin (Tradjenta) 5 MG tablet tablet Take 1 tablet by mouth Daily. 90 tablet 1   • metoprolol tartrate (LOPRESSOR) 25 MG tablet Take 1 tablet by mouth 2 (Two) Times a Day. 180 tablet 1   • potassium chloride (KLOR-CON) 20 MEQ CR tablet Take 1 tablet by mouth Daily. 90 tablet 1   • potassium chloride (MICRO-K) 10 MEQ CR capsule Take 4 capsules by mouth Daily.     • vitamin C (ASCORBIC ACID) 250 MG tablet Take 250 mg by mouth Daily.       No facility-administered medications prior to visit.       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 (CMS/[facility])   • Chronic venous insufficiency   • Nonrheumatic aortic valve stenosis   • Carotid stenosis, asymptomatic   • Bradycardia   • Hyperlipidemia   • Bilateral carotid artery disease (CMS/[facility])   • Abnormal cardiovascular stress test   • H/O aortic valve replacement with porcine valve   • Charcot-Davida-Tooth disease-like deformity of foot   • Amputated toe of right foot (CMS/[facility])   • Fall   • Non-traumatic rhabdomyolysis   • S/P CABG x 2   • CKD (chronic kidney disease) stage 3, GFR 30-59 ml/min (CMS/[facility])   • Rupture of left quadriceps tendon   • Constipation   • Wound of right leg   • Anemia   • Chronic diastolic (congestive) heart failure (CMS/[facility])   • Body mass index (BMI)40.0-44.9, adult (CMS/[facility])   • Amputated toe of left foot (CMS/[facility])       Advanced Care Planning:  {Advanced Directive Status:47140}    Review of Systems    Compared to one year ago, the patient feels his physical health is {better worse same:29246}.  Compared to one year ago, the patient feels his mental health is {better worse same:05356}.    Reviewed chart for potential of high risk medication in the elderly: {Response;Yes/No/NA:[id number]::\"yes\"}  Reviewed chart for potential of harmful drug interactions in the elderly:{Response;Yes/No/NA:[id number]::\"yes\"}    Objective         Vitals:    08/13/21 1307   Resp: 18   Temp: 97.5 °F (36.4 °C)   TempSrc: Temporal   Weight: (!) 146 kg (322 lb)   Height: 185.4 cm (73\")       Body mass index is 42.48 kg/m².  Discussed the patient's BMI with him. The BMI is above average; " BMI management plan is completed.    Physical Exam          Assessment/Plan   Medicare Risks and Personalized Health Plan  CMS Preventative Services Quick Reference  {Medicare Wellness Risk Factors and Personalized Health Plan:27408}    The above risks/problems have been discussed with the patient.  Pertinent information has been shared with the patient in the After Visit Summary.  Follow up plans and orders are seen below in the Assessment/Plan Section.    Diagnoses and all orders for this visit:    1. Medicare annual wellness visit, subsequent (Primary)      Follow Up:  No follow-ups on file.     An After Visit Summary and PPPS were given to the patient.
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
## 2021-08-14 LAB
ALBUMIN SERPL-MCNC: 4.3 G/DL (ref 3.5–5.2)
ALBUMIN/CREAT UR: 1071 MG/G CREAT (ref 0–29)
ALBUMIN/GLOB SERPL: 1.4 G/DL
ALP SERPL-CCNC: 133 U/L (ref 39–117)
ALT SERPL-CCNC: 12 U/L (ref 1–41)
AST SERPL-CCNC: 13 U/L (ref 1–40)
BILIRUB SERPL-MCNC: 0.3 MG/DL (ref 0–1.2)
BUN SERPL-MCNC: 37 MG/DL (ref 8–23)
BUN/CREAT SERPL: 24.7 (ref 7–25)
CALCIUM SERPL-MCNC: 9.7 MG/DL (ref 8.6–10.5)
CHLORIDE SERPL-SCNC: 103 MMOL/L (ref 98–107)
CO2 SERPL-SCNC: 26.3 MMOL/L (ref 22–29)
CREAT SERPL-MCNC: 1.5 MG/DL (ref 0.76–1.27)
CREAT UR-MCNC: 39 MG/DL
GLOBULIN SER CALC-MCNC: 3.1 GM/DL
GLUCOSE SERPL-MCNC: 188 MG/DL (ref 65–99)
HBA1C MFR BLD: 8 % (ref 4.8–5.6)
MICROALBUMIN UR-MCNC: 417.7 UG/ML
POTASSIUM SERPL-SCNC: 4.3 MMOL/L (ref 3.5–5.2)
PROT SERPL-MCNC: 7.4 G/DL (ref 6–8.5)
SODIUM SERPL-SCNC: 139 MMOL/L (ref 136–145)

## 2021-10-01 ENCOUNTER — TELEPHONE (OUTPATIENT)
Dept: FAMILY MEDICINE CLINIC | Facility: CLINIC | Age: 76
End: 2021-10-01

## 2021-10-01 NOTE — TELEPHONE ENCOUNTER
Caller: Rock Maciel Jr.    Relationship: Self    Best call back number: [phone]    What medication are you requesting: MUSCLE RELAXER    What are your current symptoms: PAIN IN LEFT SIDE    How long have you been experiencing symptoms: ABOUT A WEEK    Have you had these symptoms before:    [] Yes  [x] No    Have you been treated for these symptoms before:   [] Yes  [x] No    If a prescription is needed, what is your preferred pharmacy and phone number:    [id number] - [place], [place] - [address] IN THE [facility] [phone] [facility] [phone] FX   Additional notes:

## 2021-10-04 ENCOUNTER — TELEPHONE (OUTPATIENT)
Dept: FAMILY MEDICINE CLINIC | Facility: CLINIC | Age: 76
End: 2021-10-04

## 2021-10-04 RX ORDER — BACLOFEN 10 MG/1
10 TABLET ORAL 2 TIMES DAILY PRN
Qty: 20 TABLET | Refills: 0 | Status: SHIPPED | OUTPATIENT
Start: 2021-10-04 | End: 2022-04-29

## 2021-10-04 NOTE — TELEPHONE ENCOUNTER
Spoke with patient about his symptoms.  Last week when he called and he had been having about a week of pain.  He said at this site where his tailbone sits into the chair is where the pain started on the left side.  Said at the time it started it was sort of sudden that he could hardly move his leg it hurt so bad.  He said he cannot move his leg up.  He was using heat and a massager and that helped significantly.  He continues to use those things.  He has a lot better mobility but still with pain.  4 days ago he went to see a chiropractor and thought he had muscle spasm and that he may benefit from a muscle relaxer.  Patient has never taken a muscle relaxant before.  He has not had any falls or any injuries.  As an aside patient also reports that he is seeing his podiatrist Dr. Paige on [facility] and he has an ulcer on his foot.  He has started treatment for this and has follow-up on 10/15/2021.    Because he still having significant pain he would like to try muscle relaxer.  I did  him on possible side effects and taking care for potential sedation.  He is in agreement.

## 2021-10-12 ENCOUNTER — APPOINTMENT (OUTPATIENT)
Dept: SLEEP MEDICINE | Facility: HOSPITAL | Age: 76
End: 2021-10-12

## 2021-10-19 ENCOUNTER — IMMUNIZATION (OUTPATIENT)
Dept: VACCINE CLINIC | Facility: HOSPITAL | Age: 76
End: 2021-10-19

## 2021-10-19 PROCEDURE — 91300 HC SARSCOV02 VAC 30MCG/0.3ML IM: CPT | Performed by: INTERNAL MEDICINE

## 2021-10-19 PROCEDURE — 0004A ADM SARSCOV2 30MCG/0.3ML BOOSTER: CPT | Performed by: INTERNAL MEDICINE

## 2021-12-20 RX ORDER — HYDRALAZINE HYDROCHLORIDE 50 MG/1
TABLET, FILM COATED ORAL
Qty: 270 TABLET | Refills: 1 | Status: SHIPPED | OUTPATIENT
Start: 2021-12-20 | End: 2022-07-13

## 2021-12-20 RX ORDER — GLIPIZIDE 5 MG/1
TABLET ORAL
Qty: 180 TABLET | Refills: 1 | Status: SHIPPED | OUTPATIENT
Start: 2021-12-20 | End: 2022-07-13

## 2021-12-20 NOTE — TELEPHONE ENCOUNTER
Rx Refill Note  Requested Prescriptions     Pending Prescriptions Disp Refills   • glipizide (GLUCOTROL) 5 MG tablet [Pharmacy Med Name: GLIPIZIDE TAB 5MG] 180 tablet 1     Sig: TAKE 1 TABLET TWICE A DAY   • hydrALAZINE (APRESOLINE) 50 MG tablet [Pharmacy Med Name: HYDRALAZINE  TAB 50MG] 270 tablet 1     Sig: TAKE 1 TABLET EVERY 8 HOURS      Last office visit with prescribing clinician: 2/8/2021      Next office visit with prescribing clinician: 3/7/2022            Kalani Mcleod LPN  12/20/21, 12:25 EST

## 2022-01-17 RX ORDER — BUMETANIDE 2 MG/1
TABLET ORAL
Qty: 90 TABLET | Refills: 1 | Status: SHIPPED | OUTPATIENT
Start: 2022-01-17 | End: 2022-08-08

## 2022-01-17 RX ORDER — POTASSIUM CHLORIDE 1500 MG/1
TABLET, EXTENDED RELEASE ORAL
Qty: 90 TABLET | Refills: 1 | Status: SHIPPED | OUTPATIENT
Start: 2022-01-17 | End: 2022-07-13

## 2022-01-17 RX ORDER — ATORVASTATIN CALCIUM 20 MG/1
TABLET, FILM COATED ORAL
Qty: 90 TABLET | Refills: 1 | Status: SHIPPED | OUTPATIENT
Start: 2022-01-17 | End: 2022-07-13

## 2022-01-17 RX ORDER — CLOPIDOGREL BISULFATE 75 MG/1
TABLET ORAL
Qty: 90 TABLET | Refills: 1 | Status: SHIPPED | OUTPATIENT
Start: 2022-01-17 | End: 2022-07-13

## 2022-01-17 RX ORDER — AMLODIPINE BESYLATE 10 MG/1
TABLET ORAL
Qty: 90 TABLET | Refills: 1 | Status: SHIPPED | OUTPATIENT
Start: 2022-01-17 | End: 2022-07-13

## 2022-04-29 ENCOUNTER — OFFICE VISIT (OUTPATIENT)
Dept: FAMILY MEDICINE CLINIC | Facility: CLINIC | Age: 77
End: 2022-04-29

## 2022-04-29 VITALS
TEMPERATURE: 97.7 F | SYSTOLIC BLOOD PRESSURE: 134 MMHG | DIASTOLIC BLOOD PRESSURE: 60 MMHG | OXYGEN SATURATION: 95 % | HEART RATE: 68 BPM | WEIGHT: 284 LBS | RESPIRATION RATE: 17 BRPM | HEIGHT: 73 IN | BODY MASS INDEX: 37.64 KG/M2

## 2022-04-29 DIAGNOSIS — I10 ESSENTIAL HYPERTENSION: ICD-10-CM

## 2022-04-29 DIAGNOSIS — E11.59 TYPE 2 DIABETES MELLITUS WITH OTHER CIRCULATORY COMPLICATION, WITHOUT LONG-TERM CURRENT USE OF INSULIN: Primary | ICD-10-CM

## 2022-04-29 DIAGNOSIS — E78.5 HYPERLIPIDEMIA, UNSPECIFIED HYPERLIPIDEMIA TYPE: ICD-10-CM

## 2022-04-29 PROBLEM — A48.0 GAS GANGRENE (HCC): Status: ACTIVE | Noted: 2021-10-25

## 2022-04-29 PROBLEM — L03.116 CELLULITIS OF LEFT LOWER LIMB: Status: ACTIVE | Noted: 2022-02-19

## 2022-04-29 PROBLEM — Z89.432 ACQUIRED ABSENCE OF LEFT FOOT (HCC): Status: ACTIVE | Noted: 2022-02-19

## 2022-04-29 PROCEDURE — 99214 OFFICE O/P EST MOD 30 MIN: CPT | Performed by: FAMILY MEDICINE

## 2022-04-29 RX ORDER — ZINC GLUCONATE 50 MG
TABLET ORAL
COMMUNITY
Start: 2022-02-19

## 2022-04-30 LAB
ALBUMIN SERPL-MCNC: 4.4 G/DL (ref 3.7–4.7)
ALBUMIN/GLOB SERPL: 1.3 {RATIO} (ref 1.2–2.2)
ALP SERPL-CCNC: 146 IU/L (ref 44–121)
ALT SERPL-CCNC: 7 IU/L (ref 0–44)
AST SERPL-CCNC: 13 IU/L (ref 0–40)
BILIRUB SERPL-MCNC: 0.4 MG/DL (ref 0–1.2)
BUN SERPL-MCNC: 36 MG/DL (ref 8–27)
BUN/CREAT SERPL: 25 (ref 10–24)
CALCIUM SERPL-MCNC: 9.5 MG/DL (ref 8.6–10.2)
CHLORIDE SERPL-SCNC: 105 MMOL/L (ref 96–106)
CHOLEST SERPL-MCNC: 200 MG/DL (ref 100–199)
CO2 SERPL-SCNC: 24 MMOL/L (ref 20–29)
CREAT SERPL-MCNC: 1.46 MG/DL (ref 0.76–1.27)
EGFRCR SERPLBLD CKD-EPI 2021: 49 ML/MIN/1.73
GLOBULIN SER CALC-MCNC: 3.3 G/DL (ref 1.5–4.5)
GLUCOSE SERPL-MCNC: 112 MG/DL (ref 65–99)
HBA1C MFR BLD: 5.6 % (ref 4.8–5.6)
HDLC SERPL-MCNC: 44 MG/DL
LDLC SERPL CALC-MCNC: 133 MG/DL (ref 0–99)
POTASSIUM SERPL-SCNC: 4.7 MMOL/L (ref 3.5–5.2)
PROT SERPL-MCNC: 7.7 G/DL (ref 6–8.5)
SODIUM SERPL-SCNC: 143 MMOL/L (ref 134–144)
TRIGL SERPL-MCNC: 125 MG/DL (ref 0–149)
VLDLC SERPL CALC-MCNC: 23 MG/DL (ref 5–40)

## 2022-05-17 NOTE — TELEPHONE ENCOUNTER
Rx Refill Note  Requested Prescriptions     Pending Prescriptions Disp Refills   • glucose blood (Accu-Chek Keshia Plus) test strip 100 each 0     Sig: Use to test blood sugar twice daily for diabetes   • Accu-Chek Softclix Lancets lancets 100 each 12     Sig: Test blood sugar twice daily      Last office visit with prescribing clinician: 4/29/2022      Next office visit with prescribing clinician: 9/27/2022            Kalani Mcleod LPN  05/17/22, 14:47 EDT

## 2022-05-19 RX ORDER — BLOOD SUGAR DIAGNOSTIC
STRIP MISCELLANEOUS
Qty: 100 EACH | Refills: 11 | Status: SHIPPED | OUTPATIENT
Start: 2022-05-19

## 2022-05-19 RX ORDER — LANCETS
EACH MISCELLANEOUS
Qty: 100 EACH | Refills: 11 | Status: SHIPPED | OUTPATIENT
Start: 2022-05-19 | End: 2023-05-17

## 2022-07-08 RX ORDER — LINAGLIPTIN 5 MG/1
TABLET, FILM COATED ORAL
Qty: 90 TABLET | Refills: 1 | Status: SHIPPED | OUTPATIENT
Start: 2022-07-08 | End: 2022-11-14

## 2022-07-08 NOTE — TELEPHONE ENCOUNTER
Rx Refill Note  Requested Prescriptions     Pending Prescriptions Disp Refills   • Tradjenta 5 MG tablet tablet [Pharmacy Med Name: TRADJENTA TAB 5MG] 90 tablet 1     Sig: TAKE 1 TABLET DAILY      Last office visit with prescribing clinician: 4/29/2022      Next office visit with prescribing clinician: 9/27/2022            Obdulia Tim MA  07/08/22, 15:01 EDT

## 2022-07-12 NOTE — TELEPHONE ENCOUNTER
Rx Refill Note  Requested Prescriptions     Pending Prescriptions Disp Refills   • glipizide (GLUCOTROL) 5 MG tablet [Pharmacy Med Name: GLIPIZIDE TAB 5MG] 180 tablet 1     Sig: TAKE 1 TABLET TWICE A DAY   • hydrALAZINE (APRESOLINE) 50 MG tablet [Pharmacy Med Name: HYDRALAZINE  TAB 50MG] 270 tablet 1     Sig: TAKE 1 TABLET EVERY 8 HOURS   • clopidogrel (PLAVIX) 75 MG tablet [Pharmacy Med Name: CLOPIDOGREL  TAB 75MG] 90 tablet 1     Sig: TAKE 1 TABLET DAILY   • atorvastatin (LIPITOR) 20 MG tablet [Pharmacy Med Name: ATORVASTATIN TAB 20MG] 90 tablet 1     Sig: TAKE 1 TABLET DAILY   • metoprolol tartrate (LOPRESSOR) 25 MG tablet [Pharmacy Med Name: METOPROL TAR TAB 25MG] 180 tablet 1     Sig: TAKE 1 TABLET TWICE A DAY   • amLODIPine (NORVASC) 10 MG tablet [Pharmacy Med Name: AMLODIPINE TAB 10MG] 90 tablet 1     Sig: TAKE 1 TABLET DAILY   • potassium chloride (K-DUR,KLOR-CON) 20 MEQ CR tablet [Pharmacy Med Name: POT CL MICRO TAB 20MEQ ER] 90 tablet 1     Sig: TAKE 1 TABLET DAILY      Last office visit with prescribing clinician: 4/29/2022      Next office visit with prescribing clinician: 9/27/2022            Kalani Mcleod LPN  07/12/22, 09:20 EDT

## 2022-07-13 RX ORDER — GLIPIZIDE 5 MG/1
TABLET ORAL
Qty: 180 TABLET | Refills: 1 | Status: SHIPPED | OUTPATIENT
Start: 2022-07-13 | End: 2022-11-14

## 2022-07-13 RX ORDER — AMLODIPINE BESYLATE 10 MG/1
TABLET ORAL
Qty: 90 TABLET | Refills: 1 | Status: SHIPPED | OUTPATIENT
Start: 2022-07-13 | End: 2022-11-14

## 2022-07-13 RX ORDER — HYDRALAZINE HYDROCHLORIDE 50 MG/1
TABLET, FILM COATED ORAL
Qty: 270 TABLET | Refills: 1 | Status: SHIPPED | OUTPATIENT
Start: 2022-07-13 | End: 2022-11-14

## 2022-07-13 RX ORDER — POTASSIUM CHLORIDE 20 MEQ/1
TABLET, EXTENDED RELEASE ORAL
Qty: 90 TABLET | Refills: 1 | Status: SHIPPED | OUTPATIENT
Start: 2022-07-13 | End: 2022-11-14

## 2022-07-13 RX ORDER — CLOPIDOGREL BISULFATE 75 MG/1
TABLET ORAL
Qty: 90 TABLET | Refills: 1 | Status: SHIPPED | OUTPATIENT
Start: 2022-07-13 | End: 2022-11-14

## 2022-07-13 RX ORDER — ATORVASTATIN CALCIUM 20 MG/1
TABLET, FILM COATED ORAL
Qty: 90 TABLET | Refills: 1 | Status: SHIPPED | OUTPATIENT
Start: 2022-07-13 | End: 2022-11-14

## 2022-08-08 RX ORDER — BUMETANIDE 2 MG/1
TABLET ORAL
Qty: 90 TABLET | Refills: 1 | Status: SHIPPED | OUTPATIENT
Start: 2022-08-08

## 2022-08-08 NOTE — TELEPHONE ENCOUNTER
Rx Refill Note  Requested Prescriptions     Pending Prescriptions Disp Refills   • bumetanide (BUMEX) 2 MG tablet [Pharmacy Med Name: BUMETANIDE TAB 2MG] 90 tablet 1     Sig: TAKE 1 TABLET DAILY      Last office visit with prescribing clinician: 4/29/2022      Next office visit with prescribing clinician: 9/27/2022            Kalani Mcleod LPN  08/08/22, 09:02 EDT

## 2022-10-13 ENCOUNTER — TELEPHONE (OUTPATIENT)
Dept: FAMILY MEDICINE CLINIC | Facility: CLINIC | Age: 77
End: 2022-10-13

## 2022-10-13 NOTE — TELEPHONE ENCOUNTER
Caller: Rock Maciel Jr.    Relationship: Self    Best call back number: [phone]    Caller requesting test results:     What test was performed: URINE TEST    When was the test performed: 10/10/22    Where was the test performed: [facility](SENT TO [facility])    Additional notes: PATIENT IS CALLING IN WANTING TO KNOW IF DR THOMAS HAS RECEIVED HIS URINE RESULTS AS HE SAYS HE NEEDS A MEDICATION BECAUSE WHEN HE URINATES IT BURNS SOME. HE WANTS TO USE THE [facility]  [address] [facility]  [phone]

## 2022-10-14 ENCOUNTER — TELEPHONE (OUTPATIENT)
Dept: FAMILY MEDICINE CLINIC | Facility: CLINIC | Age: 77
End: 2022-10-14

## 2022-10-14 NOTE — TELEPHONE ENCOUNTER
Caller: Rock Maciel Jr.    Relationship: Self    Best call back number: [phone]    What medication are you requesting: URINARY TRACT INFECTION RELIEF    What are your current symptoms: BURNING WHILE URINATING    Have you had these symptoms before:    [] Yes  [x] No    Have you been treated for these symptoms before:   [] Yes  [x] No    If a prescription is needed, what is your preferred pharmacy and phone number: [facility]Y #[id number] [place], [place] - [address] AT IN [facility] [phone] [facility] [phone]      Additional notes: PATIENT STATES HE HAD A URINE TEST ON 10/10/22 AND IT CAME BACK WITH HIM HAVING A URINARY TRACT INFECTION. HE  IS HAVING BURNING WHILE URINATING AND IS REQUESTING A PRESCRIPTION FOR RELIEF. PLEASE CALL AND ADVISE IF NECESSARY.

## 2022-10-16 RX ORDER — NITROFURANTOIN 25; 75 MG/1; MG/1
100 CAPSULE ORAL 2 TIMES DAILY
Qty: 14 CAPSULE | Refills: 0 | Status: SHIPPED | OUTPATIENT
Start: 2022-10-16 | End: 2022-11-09

## 2022-10-24 ENCOUNTER — TELEPHONE (OUTPATIENT)
Dept: FAMILY MEDICINE CLINIC | Facility: CLINIC | Age: 77
End: 2022-10-24

## 2022-10-24 NOTE — TELEPHONE ENCOUNTER
Caller: Rock Maciel Jr.    Relationship: Self    Best call back number:     What is the best time to reach you:     Who are you requesting to speak with (clinical staff, provider,  specific staff member):     Do you know the name of the person who called:     What was the call regarding: PATIENT IS CALLING IN STATING THAT HE HAS FINISHED THE MEDICATION THAT HE WAS PRESCRIBED FOR HIS URINE ISSUES.  HE SAYS HIS URINE IS STILL COLORED AND NOT CLEAR AND HE WANTS TO KNOW IF HE NEEDS MORE MEDICATIONS OR IF HE NEEDS TO HAVE HIS HOME HEALTH NURSE DO A NEW URINE TEST ON HIM TODAY WHEN SHE COMES TO SEE HIM AT [place] TODAY. HE WANTS TO BE CALLED TO DISCUSS    Do you require a callback: YES

## 2022-10-25 NOTE — TELEPHONE ENCOUNTER
Please call this pt. I want to make sure he is drinking enough water. This can create discolored urine as well as odorous urine if he is dehydrated. Additionally the antibiotic is still likely in his system to some degree and can lead to those changes. If he is not having other UTI symptoms I would suggest hydrating well and giving a little more time. Tests right after antibiotics are not very helpful. The good news is his urine was cultured and we know the antibiotic we used was effective to treat the bacteria and he took antibiotics for a week which is necessary for UTI in men.

## 2022-11-07 NOTE — TELEPHONE ENCOUNTER
Caller: Rock Maciel Jr.    Relationship: Self    Best call back number: [phone]    Who are you requesting to speak with (clinical staff, provider,  specific staff member): DR THOMAS OR MA    What was the call regarding: PATIENT STATES THAT HIS URINE IS STILL DISCOLORED, HE HAS SOME MINOR PAIN WHILE URINATING, AND HIS BLOOD SUGAR HAS BEEN SLIGHTLY ELEVATED. HE MENTIONED THAT HE HAS A HOME NURSE, AND WOULD HAVE HER COLLECT A URINE SAMPLE IF ABLE. REQUEST CALL BACK TO DISCUSS FURTHER    Do you require a callback: YES

## 2022-11-09 RX ORDER — NITROFURANTOIN 25; 75 MG/1; MG/1
100 CAPSULE ORAL 2 TIMES DAILY
Qty: 20 CAPSULE | Refills: 0 | Status: SHIPPED | OUTPATIENT
Start: 2022-11-09 | End: 2023-03-23

## 2022-11-09 NOTE — PROGRESS NOTES
Pt with continued urinary symptoms.   Having odor and burning. He has elevated sugars. Running up to 150s and up to 180. Last week higher. Usually around 120s.   His nurse can come by and collect urine.   His nurses name is Ron Pabon.   Her number is [phone]    Finished abx 10/23/22  Feels like he was getting better but wasn't totally over it.   Symptoms have just persisted.     Spoke with Shonna about getting urinalysis and culture.   She is going to call me when she has results. She took a verbal.     Sent in abx. Told him not to start the abx until he gives his sample.   Will send for 10 days.   Said no UTIs until he was admitted to rehab after his amputation.   Recommended that he see urology especially if he is not improving again.   Looking at the susceptibilities from last culture will avoid bactrim for kidney function concerns and refill the macrobid.

## 2022-11-14 DIAGNOSIS — N18.30 STAGE 3 CHRONIC KIDNEY DISEASE, UNSPECIFIED WHETHER STAGE 3A OR 3B CKD: Primary | ICD-10-CM

## 2022-11-14 RX ORDER — CIPROFLOXACIN 500 MG/1
500 TABLET, FILM COATED ORAL 2 TIMES DAILY
Qty: 14 TABLET | Refills: 0 | Status: SHIPPED | OUTPATIENT
Start: 2022-11-14 | End: 2023-03-23

## 2022-11-14 RX ORDER — CLOPIDOGREL BISULFATE 75 MG/1
TABLET ORAL
Qty: 90 TABLET | Refills: 1 | Status: SHIPPED | OUTPATIENT
Start: 2022-11-14

## 2022-11-14 RX ORDER — GLIPIZIDE 5 MG/1
TABLET ORAL
Qty: 180 TABLET | Refills: 1 | Status: SHIPPED | OUTPATIENT
Start: 2022-11-14

## 2022-11-14 RX ORDER — ATORVASTATIN CALCIUM 20 MG/1
TABLET, FILM COATED ORAL
Qty: 90 TABLET | Refills: 1 | Status: SHIPPED | OUTPATIENT
Start: 2022-11-14

## 2022-11-14 RX ORDER — LINAGLIPTIN 5 MG/1
TABLET, FILM COATED ORAL
Qty: 90 TABLET | Refills: 1 | Status: SHIPPED | OUTPATIENT
Start: 2022-11-14

## 2022-11-14 RX ORDER — HYDRALAZINE HYDROCHLORIDE 50 MG/1
TABLET, FILM COATED ORAL
Qty: 270 TABLET | Refills: 1 | Status: SHIPPED | OUTPATIENT
Start: 2022-11-14

## 2022-11-14 RX ORDER — POTASSIUM CHLORIDE 1500 MG/1
TABLET, EXTENDED RELEASE ORAL
Qty: 90 TABLET | Refills: 1 | Status: SHIPPED | OUTPATIENT
Start: 2022-11-14

## 2022-11-14 RX ORDER — AMLODIPINE BESYLATE 10 MG/1
TABLET ORAL
Qty: 90 TABLET | Refills: 1 | Status: SHIPPED | OUTPATIENT
Start: 2022-11-14

## 2022-11-14 NOTE — PROGRESS NOTES
Georgie [phone]  [id number]  Spoke with pt and he has had mild improvement.   We need to change therapy. Can't do rocephin easily. Want to avoid bactrim for kidneys.     Called about 5 numbers to get the right department with [facility] and she was so helpful.   Had susceptibilities for cipro and levofloxacin. And is susceptible. She is going to fax over.   I am going to send cipro.     Counseled pt about concurrent use of glipizide and cipro. He is taking glipizide BID so going to cut back to QAM while on the abx.

## 2022-11-14 NOTE — PROGRESS NOTES
Received message from Neda asking to fax order for BMP to this patients  nurse. Spoke with Ron via phone call today. Verbal was given to her by this nurse. She was not able to give this nurse a fax number. Order to be competed on this Thursday. Order was verbally read back to this nurse.

## 2022-11-14 NOTE — TELEPHONE ENCOUNTER
Rx Refill Note  Requested Prescriptions     Pending Prescriptions Disp Refills   • clopidogrel (PLAVIX) 75 MG tablet [Pharmacy Med Name: CLOPIDOGREL  TAB 75MG] 90 tablet 1     Sig: TAKE 1 TABLET DAILY   • hydrALAZINE (APRESOLINE) 50 MG tablet [Pharmacy Med Name: HYDRALAZINE  TAB 50MG] 270 tablet 1     Sig: TAKE 1 TABLET EVERY 8 HOURS   • amLODIPine (NORVASC) 10 MG tablet [Pharmacy Med Name: AMLODIPINE TAB 10MG] 90 tablet 1     Sig: TAKE 1 TABLET DAILY   • KLOR-CON 20 MEQ CR tablet [Pharmacy Med Name: KLOR-CON M20 TAB 20MEQ ER] 90 tablet 1     Sig: TAKE 1 TABLET DAILY   • metoprolol tartrate (LOPRESSOR) 25 MG tablet [Pharmacy Med Name: METOPROL TAR TAB 25MG] 180 tablet 1     Sig: TAKE 1 TABLET TWICE A DAY   • glipizide (GLUCOTROL) 5 MG tablet [Pharmacy Med Name: GLIPIZIDE TAB 5MG] 180 tablet 1     Sig: TAKE 1 TABLET TWICE A DAY   • atorvastatin (LIPITOR) 20 MG tablet [Pharmacy Med Name: ATORVASTATIN TAB 20MG] 90 tablet 1     Sig: TAKE 1 TABLET DAILY   • Tradjenta 5 MG tablet tablet [Pharmacy Med Name: TRADJENTA TAB 5MG] 90 tablet 1     Sig: TAKE 1 TABLET DAILY      Last office visit with prescribing clinician: 4/29/2022      Next office visit with prescribing clinician: Visit date not found            Kalani Mcleod LPN  11/14/22, 14:39 EST

## 2022-11-18 ENCOUNTER — TELEPHONE (OUTPATIENT)
Dept: FAMILY MEDICINE CLINIC | Facility: CLINIC | Age: 77
End: 2022-11-18

## 2022-11-18 NOTE — TELEPHONE ENCOUNTER
Spoke with patient's home health nurse with his BMP results are before she was getting go in and evaluate the patient.  His last BUN was 36 creatinine 1.46 and this weeks results were BUN 40 creatinine 1.96.  Patient is going to be advised to drink 2L of water per day.  If he voices affirmation for doing that we may adjust his Bumex and repeat the BMP.  Home health nurse reports when she was with patient earlier this week he said his urinary tract infection symptoms were improving.

## 2023-03-23 ENCOUNTER — OFFICE VISIT (OUTPATIENT)
Dept: FAMILY MEDICINE CLINIC | Facility: CLINIC | Age: 78
End: 2023-03-23
Payer: MEDICARE

## 2023-03-23 VITALS
HEIGHT: 73 IN | SYSTOLIC BLOOD PRESSURE: 138 MMHG | OXYGEN SATURATION: 92 % | BODY MASS INDEX: 38.3 KG/M2 | RESPIRATION RATE: 19 BRPM | WEIGHT: 289 LBS | HEART RATE: 74 BPM | TEMPERATURE: 98.4 F | DIASTOLIC BLOOD PRESSURE: 68 MMHG

## 2023-03-23 DIAGNOSIS — E78.5 HYPERLIPIDEMIA, UNSPECIFIED HYPERLIPIDEMIA TYPE: ICD-10-CM

## 2023-03-23 DIAGNOSIS — Z00.00 MEDICARE ANNUAL WELLNESS VISIT, SUBSEQUENT: Primary | ICD-10-CM

## 2023-03-23 DIAGNOSIS — N18.30 STAGE 3 CHRONIC KIDNEY DISEASE, UNSPECIFIED WHETHER STAGE 3A OR 3B CKD: ICD-10-CM

## 2023-03-23 DIAGNOSIS — M79.89 SOFT TISSUE MASS: ICD-10-CM

## 2023-03-23 DIAGNOSIS — I87.2 CHRONIC VENOUS INSUFFICIENCY: ICD-10-CM

## 2023-03-23 DIAGNOSIS — E11.59 TYPE 2 DIABETES MELLITUS WITH OTHER CIRCULATORY COMPLICATION, WITHOUT LONG-TERM CURRENT USE OF INSULIN: ICD-10-CM

## 2023-03-23 DIAGNOSIS — I10 ESSENTIAL HYPERTENSION: ICD-10-CM

## 2023-03-23 NOTE — PROGRESS NOTES
"Chief Complaint  Diabetes    Subjective        Rock Maciel Jr. presents to [facility]  History of Present Illness      Objective   Vital Signs:  /68 (BP Location: Right arm, Patient Position: Sitting, Cuff Size: Adult)   Pulse 74   Temp 98.4 °F (36.9 °C) (Infrared)   Resp 19   Ht 185.4 cm (73\")   Wt 131 kg (289 lb)   SpO2 92%   BMI 38.13 kg/m²     Class 2 Severe Obesity (BMI >=35 and <=39.9). Obesity-related health conditions include the following: hypertension, coronary heart disease, diabetes mellitus, dyslipidemias and lower extremity venous stasis disease. Obesity is improving with lifestyle modifications. BMI is is above average; BMI management plan is completed. We discussed portion control and increasing exercise.      Physical Exam  HENT:      Ears:      Comments: Clear. Mild cerumen on anterior wall.     Mouth/Throat:      Comments: Normal  Neck:      Thyroid: No thyromegaly.      Vascular: No carotid bruit.      Comments: No cervical lymphadenopathy. No supraclavicular lymphadenopathy.  Cardiovascular:      Rate and Rhythm: Regular rhythm.      Comments: 3/6 systolic murmur.   Pulmonary:      Comments: Clear  Musculoskeletal:      Right lower le+ Edema present.      Left lower le+ Edema present.      Comments: Trace edema to upper portion of the right leg.          Result Review :                    Assessment and Plan   Diagnoses and all orders for this visit:    1. Medicare annual wellness visit, subsequent (Primary)  -     Hemoglobin A1c  -     CBC & Differential  -     Comprehensive Metabolic Panel  -     Lipid Panel  -     TSH  -     Protein / Creatinine Ratio, Urine - Urine, Clean Catch  -     Vitamin D,25-Hydroxy  -     Phosphorus    2. Essential hypertension  -     Hemoglobin A1c  -     CBC & Differential  -     Comprehensive Metabolic Panel  -     Lipid Panel  -     TSH  -     Protein / Creatinine Ratio, Urine - Urine, Clean Catch  -     " Vitamin D,25-Hydroxy  -     Phosphorus    3. Hyperlipidemia, unspecified hyperlipidemia type  -     Hemoglobin A1c  -     CBC & Differential  -     Comprehensive Metabolic Panel  -     Lipid Panel  -     TSH  -     Protein / Creatinine Ratio, Urine - Urine, Clean Catch  -     Vitamin D,25-Hydroxy  -     Phosphorus    4. Type 2 diabetes mellitus with other circulatory complication, without long-term current use of insulin (HCC)  -     Hemoglobin A1c  -     CBC & Differential  -     Comprehensive Metabolic Panel  -     Lipid Panel  -     TSH  -     Protein / Creatinine Ratio, Urine - Urine, Clean Catch  -     Vitamin D,25-Hydroxy  -     Phosphorus    5. Stage 3 chronic kidney disease, unspecified whether stage 3a or 3b CKD (HCC)  -     Hemoglobin A1c  -     CBC & Differential  -     Comprehensive Metabolic Panel  -     Lipid Panel  -     TSH  -     Protein / Creatinine Ratio, Urine - Urine, Clean Catch  -     Vitamin D,25-Hydroxy  -     Phosphorus    6. Chronic venous insufficiency  -     Hemoglobin A1c  -     CBC & Differential  -     Comprehensive Metabolic Panel  -     Lipid Panel  -     TSH  -     Protein / Creatinine Ratio, Urine - Urine, Clean Catch  -     Vitamin D,25-Hydroxy  -     Phosphorus    7. Soft tissue mass  -     US chest; Future      Type 2 diabetes  -He will continue taking glipizide and Tradjenta.  He will receive laboratory studies today.     Hypertension  -He will continue taking amlodipine 10 mg, hydralazine 3 times, and metoprolol. BP ok. Little elevated on SBP but no adjustment at this time.     Obesity  -He is maintaining his weight.  Doing well with diet.         Follow Up   No follow-ups on file.  Patient was given instructions and counseling regarding his condition or for health maintenance advice. Please see specific information pulled into the AVS if appropriate.       Transcribed from ambient dictation for Karen Red MD by Yoly Freire.  03/23/23   15:58 EDT    Patient or patient  representative verbalized consent to the visit recording.  I have personally performed the services described in this document as transcribed by the above individual, and it is both accurate and complete.

## 2023-03-24 LAB
25(OH)D3+25(OH)D2 SERPL-MCNC: 47.4 NG/ML (ref 30–100)
ALBUMIN SERPL-MCNC: 4.7 G/DL (ref 3.5–5.2)
ALBUMIN/GLOB SERPL: 1.3 G/DL
ALP SERPL-CCNC: 144 U/L (ref 39–117)
ALT SERPL-CCNC: 12 U/L (ref 1–41)
AST SERPL-CCNC: 11 U/L (ref 1–40)
BASOPHILS # BLD AUTO: 0.01 10*3/MM3 (ref 0–0.2)
BASOPHILS NFR BLD AUTO: 0.1 % (ref 0–1.5)
BILIRUB SERPL-MCNC: 0.4 MG/DL (ref 0–1.2)
BUN SERPL-MCNC: 49 MG/DL (ref 8–23)
BUN/CREAT SERPL: 28.5 (ref 7–25)
CALCIUM SERPL-MCNC: 9.7 MG/DL (ref 8.6–10.5)
CHLORIDE SERPL-SCNC: 102 MMOL/L (ref 98–107)
CHOLEST SERPL-MCNC: 185 MG/DL (ref 0–200)
CO2 SERPL-SCNC: 29.5 MMOL/L (ref 22–29)
CREAT SERPL-MCNC: 1.72 MG/DL (ref 0.76–1.27)
CREAT UR-MCNC: 65.1 MG/DL
EGFRCR SERPLBLD CKD-EPI 2021: 40.2 ML/MIN/1.73
EOSINOPHIL # BLD AUTO: 0.24 10*3/MM3 (ref 0–0.4)
EOSINOPHIL NFR BLD AUTO: 3 % (ref 0.3–6.2)
ERYTHROCYTE [DISTWIDTH] IN BLOOD BY AUTOMATED COUNT: 12.9 % (ref 12.3–15.4)
GLOBULIN SER CALC-MCNC: 3.5 GM/DL
GLUCOSE SERPL-MCNC: 130 MG/DL (ref 65–99)
HBA1C MFR BLD: 6.5 % (ref 4.8–5.6)
HCT VFR BLD AUTO: 41.3 % (ref 37.5–51)
HDLC SERPL-MCNC: 46 MG/DL (ref 40–60)
HGB BLD-MCNC: 13.3 G/DL (ref 13–17.7)
IMM GRANULOCYTES # BLD AUTO: 0.02 10*3/MM3 (ref 0–0.05)
IMM GRANULOCYTES NFR BLD AUTO: 0.2 % (ref 0–0.5)
LDLC SERPL CALC-MCNC: 113 MG/DL (ref 0–100)
LYMPHOCYTES # BLD AUTO: 2.57 10*3/MM3 (ref 0.7–3.1)
LYMPHOCYTES NFR BLD AUTO: 32 % (ref 19.6–45.3)
MCH RBC QN AUTO: 28.8 PG (ref 26.6–33)
MCHC RBC AUTO-ENTMCNC: 32.2 G/DL (ref 31.5–35.7)
MCV RBC AUTO: 89.4 FL (ref 79–97)
MONOCYTES # BLD AUTO: 0.51 10*3/MM3 (ref 0.1–0.9)
MONOCYTES NFR BLD AUTO: 6.3 % (ref 5–12)
NEUTROPHILS # BLD AUTO: 4.69 10*3/MM3 (ref 1.7–7)
NEUTROPHILS NFR BLD AUTO: 58.4 % (ref 42.7–76)
NRBC BLD AUTO-RTO: 0.1 /100 WBC (ref 0–0.2)
PHOSPHATE SERPL-MCNC: 4.6 MG/DL (ref 2.5–4.5)
PLATELET # BLD AUTO: 273 10*3/MM3 (ref 140–450)
POTASSIUM SERPL-SCNC: 4.9 MMOL/L (ref 3.5–5.2)
PROT SERPL-MCNC: 8.2 G/DL (ref 6–8.5)
PROT UR-MCNC: 135.4 MG/DL
PROT/CREAT UR: 2079.9 MG/G CREA (ref 0–200)
RBC # BLD AUTO: 4.62 10*6/MM3 (ref 4.14–5.8)
SODIUM SERPL-SCNC: 140 MMOL/L (ref 136–145)
TRIGL SERPL-MCNC: 149 MG/DL (ref 0–150)
TSH SERPL DL<=0.005 MIU/L-ACNC: 2.66 UIU/ML (ref 0.27–4.2)
VLDLC SERPL CALC-MCNC: 26 MG/DL (ref 5–40)
WBC # BLD AUTO: 8.04 10*3/MM3 (ref 3.4–10.8)

## 2023-03-29 ENCOUNTER — HOSPITAL ENCOUNTER (OUTPATIENT)
Dept: ULTRASOUND IMAGING | Facility: HOSPITAL | Age: 78
Discharge: HOME | End: 2023-03-29
Admitting: FAMILY MEDICINE
Payer: MEDICARE

## 2023-03-29 DIAGNOSIS — M79.89 SOFT TISSUE MASS: ICD-10-CM

## 2023-03-29 PROCEDURE — 76604 US EXAM CHEST: CPT

## 2023-04-06 DIAGNOSIS — L72.3 SEBACEOUS CYST: Primary | ICD-10-CM

## 2023-04-06 NOTE — PROGRESS NOTES
The ABCs of the Annual Wellness Visit  Subsequent Medicare Wellness Visit    Subjective      Rock Maciel Jr. is a 77 y.o. male who presents for a Subsequent Medicare Wellness Visit.    The following portions of the patient's history were reviewed and   updated as appropriate: allergies, current medications, past family history, past medical history, past social history, past surgical history and problem list.    Compared to one year ago, the patient feels his physical   health is the same.    Compared to one year ago, the patient feels his mental   health is the same.    Recent Hospitalizations:  He was not admitted to the hospital during the last year.       Current Medical Providers:  Patient Care Team:  Karen Red MD as PCP - General (Family Medicine)  Azam Banegas Jr., MD as Consulting Physician (Cardiology)  Jamil Stevens MD as Consulting Physician (Nephrology)    Outpatient Medications Prior to Visit   Medication Sig Dispense Refill   • Accu-Chek Softclix Lancets lancets Test blood sugar twice daily 100 each 11   • acetaminophen (TYLENOL) 325 MG tablet Take 2 tablets by mouth Every 4 (Four) Hours As Needed for Mild Pain .     • amLODIPine (NORVASC) 10 MG tablet TAKE 1 TABLET DAILY 90 tablet 1   • atorvastatin (LIPITOR) 20 MG tablet TAKE 1 TABLET DAILY 90 tablet 1   • bumetanide (BUMEX) 2 MG tablet TAKE 1 TABLET DAILY 90 tablet 1   • Cholecalciferol (VITAMIN D3 PO) Take 2,000 Units by mouth Daily. PT HOLDING FOR SURGERY     • clopidogrel (PLAVIX) 75 MG tablet TAKE 1 TABLET DAILY 90 tablet 1   • glipizide (GLUCOTROL) 5 MG tablet TAKE 1 TABLET TWICE A  tablet 1   • glucose blood (Accu-Chek Keshia Plus) test strip Use to test blood sugar twice daily for diabetes 100 each 11   • hydrALAZINE (APRESOLINE) 50 MG tablet TAKE 1 TABLET EVERY 8 HOURS 270 tablet 1   • KLOR-CON 20 MEQ CR tablet TAKE 1 TABLET DAILY 90 tablet 1   • metoprolol tartrate (LOPRESSOR) 25 MG tablet TAKE 1 TABLET TWICE A DAY  180 tablet 1   • Tradjenta 5 MG tablet tablet TAKE 1 TABLET DAILY 90 tablet 1   • vitamin C (ASCORBIC ACID) 250 MG tablet Take 1 tablet by mouth Daily.     • Zinc 50 MG tablet 1 tablet DAILY (route: oral)     • ciprofloxacin (Cipro) 500 MG tablet Take 1 tablet by mouth 2 (Two) Times a Day. Hydrate well and have 10 ounces of water with each dose of antibiotic. 14 tablet 0   • nitrofurantoin, macrocrystal-monohydrate, (Macrobid) 100 MG capsule Take 1 capsule by mouth 2 (Two) Times a Day. 20 capsule 0   • potassium chloride (MICRO-K) 10 MEQ CR capsule Take 4 capsules by mouth Daily.       No facility-administered medications prior to visit.       No opioid medication identified on active medication list. I have reviewed chart for other potential  high risk medication/s and harmful drug interactions in the elderly.          Aspirin is not on active medication list.  Aspirin use is not indicated based on review of current medical condition/s. Risk of harm outweighs potential benefits.  .    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   • Chronic venous insufficiency   • Nonrheumatic aortic valve stenosis   • Carotid stenosis, asymptomatic   • Bradycardia   • Hyperlipidemia   • Bilateral carotid artery disease   • Abnormal cardiovascular stress test   • H/O aortic valve replacement with porcine valve   • Charcot-Davida-Tooth disease-like deformity of foot   • Amputated toe of right foot   • Fall   •  "Non-traumatic rhabdomyolysis   • S/P CABG x 2   • CKD (chronic kidney disease) stage 3, GFR 30-59 ml/min   • Rupture of left quadriceps tendon   • Constipation   • Wound of right leg   • Anemia   • Chronic diastolic (congestive) heart failure   • Body mass index (BMI)40.0-44.9, adult   • Amputated toe of left foot   • Gas gangrene   • Cellulitis of left lower limb   • Acquired absence of left foot     Advance Care Planning   Advance Care Planning     Advance Directive is not on file.  ACP discussion was held with the patient during this visit. Patient has an advance directive (not in EMR), copy requested.     Objective    Vitals:    03/23/23 1401   BP: 138/68   BP Location: Right arm   Patient Position: Sitting   Cuff Size: Adult   Pulse: 74   Resp: 19   Temp: 98.4 °F (36.9 °C)   TempSrc: Infrared   SpO2: 92%   Weight: 131 kg (289 lb)   Height: 185.4 cm (73\")     Estimated body mass index is 38.13 kg/m² as calculated from the following:    Height as of this encounter: 185.4 cm (73\").    Weight as of this encounter: 131 kg (289 lb).          Does the patient have evidence of cognitive impairment?   No    Lab Results   Component Value Date    CHLPL 185 03/23/2023    TRIG 149 03/23/2023    HDL 46 03/23/2023     (H) 03/23/2023    VLDL 26 03/23/2023    HGBA1C 6.50 (H) 03/23/2023          HEALTH RISK ASSESSMENT    Smoking Status:  Social History     Tobacco Use   Smoking Status Never   Smokeless Tobacco Never     Alcohol Consumption:  Social History     Substance and Sexual Activity   Alcohol Use Yes    Comment: either one glass wine or one glass liquor per  day     Fall Risk Screen:    STEADI Fall Risk Assessment was completed, and patient is at MODERATE risk for falls. Assessment completed on:3/23/2023    Depression Screening:  PHQ-2/PHQ-9 Depression Screening 3/23/2023   Little Interest or Pleasure in Doing Things 0-->not at all   Feeling Down, Depressed or Hopeless 0-->not at all   PHQ-9: Brief Depression " Severity Measure Score 0       Health Habits and Functional and Cognitive Screening:  Functional & Cognitive Status 8/13/2021   Do you have difficulty preparing food and eating? No   Do you have difficulty bathing yourself, getting dressed or grooming yourself? No   Do you have difficulty using the toilet? No   Do you have difficulty moving around from place to place? No   Do you have trouble with steps or getting out of a bed or a chair? No   Current Diet Low Carb Diet   Dental Exam Up to date   Eye Exam Up to date   Exercise (times per week) -   Current Exercises Include No Regular Exercise;Cardiovascular Workout   Current Exercise Activities Include -   Do you need help using the phone?  No   Are you deaf or do you have serious difficulty hearing?  No   Do you need help with transportation? No   Do you need help shopping? No   Do you need help preparing meals?  No   Do you need help with housework?  No   Do you need help with laundry? No   Do you need help taking your medications? No   Do you need help managing money? No   Do you ever drive or ride in a car without wearing a seat belt? No   Have you felt unusual stress, anger or loneliness in the last month? No   Who do you live with? Alone   If you need help, do you have trouble finding someone available to you? No   Have you been bothered in the last four weeks by sexual problems? No   Do you have difficulty concentrating, remembering or making decisions? No       Age-appropriate Screening Schedule:  Refer to the list below for future screening recommendations based on patient's age, sex and/or medical conditions. Orders for these recommended tests are listed in the plan section. The patient has been provided with a written plan.    Health Maintenance   Topic Date Due   • COVID-19 Vaccine (4 - Booster for Pfizer series) 12/14/2021   • DIABETIC EYE EXAM  04/30/2022   • ANNUAL WELLNESS VISIT  03/12/2024 (Originally 8/13/2022)   • INFLUENZA VACCINE  08/01/2023   •  HEMOGLOBIN A1C  09/23/2023   • LIPID PANEL  03/23/2024   • URINE MICROALBUMIN  03/23/2024   • TDAP/TD VACCINES (2 - Td or Tdap) 06/24/2025   • COLORECTAL CANCER SCREENING  10/01/2028   • HEPATITIS C SCREENING  Completed   • Pneumococcal Vaccine 65+  Completed   • ZOSTER VACCINE  Completed                  CMS Preventative Services Quick Reference  Risk Factors Identified During Encounter:    Fall Risk-High or Moderate: he is doing well. no changes. uses assistive device.   Hearing Problem: no referral at this time    The above risks/problems have been discussed with the patient.  Pertinent information has been shared with the patient in the After Visit Summary.    Diagnoses and all orders for this visit:    1. Medicare annual wellness visit, subsequent (Primary)  -     Hemoglobin A1c  -     CBC & Differential  -     Comprehensive Metabolic Panel  -     Lipid Panel  -     TSH  -     Protein / Creatinine Ratio, Urine - Urine, Clean Catch  -     Vitamin D,25-Hydroxy  -     Phosphorus    2. Essential hypertension  -     Hemoglobin A1c  -     CBC & Differential  -     Comprehensive Metabolic Panel  -     Lipid Panel  -     TSH  -     Protein / Creatinine Ratio, Urine - Urine, Clean Catch  -     Vitamin D,25-Hydroxy  -     Phosphorus    3. Hyperlipidemia, unspecified hyperlipidemia type  -     Hemoglobin A1c  -     CBC & Differential  -     Comprehensive Metabolic Panel  -     Lipid Panel  -     TSH  -     Protein / Creatinine Ratio, Urine - Urine, Clean Catch  -     Vitamin D,25-Hydroxy  -     Phosphorus    4. Type 2 diabetes mellitus with other circulatory complication, without long-term current use of insulin  -     Hemoglobin A1c  -     CBC & Differential  -     Comprehensive Metabolic Panel  -     Lipid Panel  -     TSH  -     Protein / Creatinine Ratio, Urine - Urine, Clean Catch  -     Vitamin D,25-Hydroxy  -     Phosphorus    5. Stage 3 chronic kidney disease, unspecified whether stage 3a or 3b CKD  -      Hemoglobin A1c  -     CBC & Differential  -     Comprehensive Metabolic Panel  -     Lipid Panel  -     TSH  -     Protein / Creatinine Ratio, Urine - Urine, Clean Catch  -     Vitamin D,25-Hydroxy  -     Phosphorus    6. Chronic venous insufficiency  -     Hemoglobin A1c  -     CBC & Differential  -     Comprehensive Metabolic Panel  -     Lipid Panel  -     TSH  -     Protein / Creatinine Ratio, Urine - Urine, Clean Catch  -     Vitamin D,25-Hydroxy  -     Phosphorus    7. Soft tissue mass  -     US chest; Future        Follow Up:   Next Medicare Wellness visit to be scheduled in 1 year.      An After Visit Summary and PPPS were made available to the patient.

## 2023-04-11 NOTE — TELEPHONE ENCOUNTER
Rx Refill Note  Requested Prescriptions     Pending Prescriptions Disp Refills   • linagliptin (Tradjenta) 5 MG tablet tablet 90 tablet 1     Sig: Take 1 tablet by mouth Daily.      Last office visit with prescribing clinician: 3/23/2023   Last telemedicine visit with prescribing clinician: Visit date not found   Next office visit with prescribing clinician: Visit date not found                         Would you like a call back once the refill request has been completed: [] Yes [] No    If the office needs to give you a call back, can they leave a voicemail: [] Yes [] No    Kalani Mcleod LPN  04/11/23, 16:26 EDT

## 2023-05-09 ENCOUNTER — TELEPHONE (OUTPATIENT)
Dept: FAMILY MEDICINE CLINIC | Facility: CLINIC | Age: 78
End: 2023-05-09

## 2023-05-09 NOTE — TELEPHONE ENCOUNTER
Called patient to advise that Neda can not sign or place any orders due to her departure and her clinical shifts completed. NO ONE answered after multiple rings.          Note

## 2023-05-09 NOTE — TELEPHONE ENCOUNTER
Caller: Rock Maciel Jr.    Relationship: Self    Best call back number: [phone]    What is the provider, practice or medical service name: [facility]    Any additional details: PATIENT STATED HE DISCUSSED THIS WITH DR THOMAS.    PLEASE CALL WHEN SENT.

## 2023-05-09 NOTE — TELEPHONE ENCOUNTER
Caller: Rock Maciel Jr.    Relationship: Self    Best call back number: [phone]    What was the call regarding: PATIENT STATED HE RECEIVED A LETTER FROM [employer] STATING THAT HE CAN GET A $100 INCENTIVE FOR HAVING HIS ANNUAL VISIT.    HE WOULD LIKE TO KNOW IF DR THOMAS WOULD CONFIRM THIS VISIT WITH HIS INSURANCE.    PLEASE CALL.    Do you require a callback: YES

## 2023-05-09 NOTE — TELEPHONE ENCOUNTER
Called patient to advise that Neda can not sign or place any orders due to her departure and her clinical shifts completed. NO ONE answered after multiple rings.

## 2023-05-12 NOTE — TELEPHONE ENCOUNTER
PATIENT CALLED BACK AND STATES HIS ANNUAL WELLNESS  VISIT WAS CODED CORRECTLY BY DR. THOMAS FOR .00 GIFT CARD TO [facility]    CALL BACK NUMBER [phone]

## 2023-05-23 ENCOUNTER — OFFICE VISIT (OUTPATIENT)
Dept: FAMILY MEDICINE CLINIC | Facility: CLINIC | Age: 78
End: 2023-05-23

## 2023-05-23 VITALS
BODY MASS INDEX: 41.75 KG/M2 | HEIGHT: 73 IN | SYSTOLIC BLOOD PRESSURE: 132 MMHG | HEART RATE: 69 BPM | OXYGEN SATURATION: 95 % | DIASTOLIC BLOOD PRESSURE: 68 MMHG | WEIGHT: 315 LBS | RESPIRATION RATE: 16 BRPM

## 2023-05-23 DIAGNOSIS — I10 ESSENTIAL HYPERTENSION: Primary | ICD-10-CM

## 2023-05-23 DIAGNOSIS — N18.31 STAGE 3A CHRONIC KIDNEY DISEASE: ICD-10-CM

## 2023-05-23 DIAGNOSIS — E11.59 TYPE 2 DIABETES MELLITUS WITH OTHER CIRCULATORY COMPLICATION, WITHOUT LONG-TERM CURRENT USE OF INSULIN: ICD-10-CM

## 2023-05-23 DIAGNOSIS — E78.5 HYPERLIPIDEMIA, UNSPECIFIED HYPERLIPIDEMIA TYPE: ICD-10-CM

## 2023-05-23 DIAGNOSIS — R01.1 CARDIAC MURMUR: ICD-10-CM

## 2023-05-23 DIAGNOSIS — R60.0 BILATERAL LOWER EXTREMITY EDEMA: ICD-10-CM

## 2023-05-23 RX ORDER — HYDRALAZINE HYDROCHLORIDE 100 MG/1
TABLET, FILM COATED ORAL
COMMUNITY
Start: 2023-04-27

## 2023-05-23 NOTE — PROGRESS NOTES
"Chief Complaint  Diabetes ([facility] )    Subjective          Rock Maciel Jr. presents to [facility] for  History of Present Illness  He is, new to me, here to follow-up on HYPERTENSION, Diabetes Type 2, and Hyperlipidemia:    HYPERTENSION - he currently takes Amlodipine 10 mg daily, Hydralazine 100mg BID, Metoprolol Tartrate 25 mg BID.  He denies SOB, chest pain, heart palpitations, lightheadedness and dizziness.    HYPERLIPIDEMIA - he is currently taking Atorvastatin 20 mg nightly.  He denies myalgia.    Carotid Endarterectomy - he was put on plavix post procedure.    DMT2 - he is currently taking Glipizide 5m BID, Tradjenta 5mg daily.  He checks his blood sugar fasting first thing in the morning.  His blood glucose level ranges between 110 to 130.  He has been working on decreasing sugar/carbs and increasing activity.    CKD Stage 3b - he is currently taking Bumex 2 mg daily.  He takes KlorCon 20mg daily.  He sees Nephrologist, Dr. Les Talavera, every 3 months.    BLE Edema - bilateral lower extremity edema.  He is going to [facility] for edema treatment the first week of June, 2023.    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    Left Foot - history of all toes amputated.    Continue all meds as directed.        Review of Systems   Respiratory:  Negative for shortness of breath.    Cardiovascular:  Positive for leg swelling. Negative for chest pain and palpitations.   Musculoskeletal:  Negative for myalgias.   Neurological:  Negative for dizziness and light-headedness.       Objective   Vital Signs:   /68 (BP Location: Right arm, Patient Position: Sitting, Cuff Size: Adult)   Pulse 69   Resp 16   Ht 185.4 cm (72.99\")   Wt (!) 143 kg (315 lb)   SpO2 95%   BMI 41.57 kg/m²     Physical Exam  Vitals and nursing note reviewed.   Constitutional:       General: He is not in acute distress.     Appearance: Normal appearance. He is not ill-appearing.   HENT: "      Head: Normocephalic and atraumatic.   Eyes:      General:         Right eye: No discharge.         Left eye: No discharge.      Conjunctiva/sclera: Conjunctivae normal.      Pupils: Pupils are equal, round, and reactive to light.   Cardiovascular:      Rate and Rhythm: Normal rate and regular rhythm.      Heart sounds: Murmur heard.    with a grade of 3/6.   Pulmonary:      Effort: Pulmonary effort is normal. No respiratory distress.      Breath sounds: Normal breath sounds. No wheezing.   Musculoskeletal:      Right lower leg: Edema present.      Left lower leg: Edema present.      Comments: BLE 1+ pitting edema   Skin:     General: Skin is warm and dry.      Findings: No erythema.   Neurological:      General: No focal deficit present.      Mental Status: He is alert.   Psychiatric:         Mood and Affect: Mood normal.      Result Review :   The following data was reviewed by: ZEINA Mccormack on 05/23/2023:  CMP          3/23/2023    14:45   CMP   Glucose 130    BUN 49    Creatinine 1.72    Sodium 140    Potassium 4.9    Chloride 102    Calcium 9.7    Total Protein 8.2    Albumin 4.7    Globulin 3.5    Total Bilirubin 0.4    Alkaline Phosphatase 144    AST (SGOT) 11    ALT (SGPT) 12    BUN/Creatinine Ratio 28.5      CBC w/diff          3/23/2023    14:45   CBC w/Diff   WBC 8.04    RBC 4.62    Hemoglobin 13.3    Hematocrit 41.3    MCV 89.4    MCH 28.8    MCHC 32.2    RDW 12.9    Platelets 273    Neutrophil Rel % 58.4    Lymphocyte Rel % 32.0    Monocyte Rel % 6.3    Eosinophil Rel % 3.0    Basophil Rel % 0.1      Lipid Panel          3/23/2023    14:45   Lipid Panel   Total Cholesterol 185    Triglycerides 149    HDL Cholesterol 46    VLDL Cholesterol 26    LDL Cholesterol  113      Most Recent A1C          3/23/2023    14:45   HGBA1C Most Recent   Hemoglobin A1C 6.50      Data reviewed : Consultant notes Dr. Talavera, Nephrology, Office Note 4/10/23.           Assessment and Plan    Diagnoses and all  orders for this visit:    1. Essential hypertension (Primary)  Assessment & Plan:  Hypertension is improving with treatment.  Continue current treatment regimen.  Dietary sodium restriction.  Weight loss.  Regular aerobic exercise.  Stop smoking.  Continue Amlodipine 10 mg daily, Hydralazine 100mg BID, and Metoprolol Tartrate 25 mg BID.  Blood pressure will be reassessed in 3 months.      2. Type 2 diabetes mellitus with other circulatory complication, without long-term current use of insulin  Assessment & Plan:  Diabetes is worsening.  A1c 6.5, up from A1c 5.6 on prior labs from 4/29/22.  Weight today 315lbs increased from 289lbs on 3/23/23; weighed on different scale so not sure if weight gain is 100% accurate based on using different scale.  Continue current treatment regimen.  Reminded to bring in blood sugar diary at next visit.  Dietary recommendations for ADA diet.  Regular aerobic exercise.  Discussed ways to avoid symptomatic hypoglycemia.  Discussed foot care.  Reminded to get yearly retinal exam.  Continue Glipizide 5mg BID and Tradjenta 5mg daily.  Diabetes will be reassessed in 3 months.      3. Hyperlipidemia, unspecified hyperlipidemia type  Assessment & Plan:  Lipid abnormalities are improving with treatment.  Nutritional counseling was provided.  Continue Atorvastatin 40mg nightly.  Lipids will be reassessed in 3 months.      4. Stage 3a chronic kidney disease  Assessment & Plan:  Renal condition is  stable .  Continue current treatment regimen.  Fluid restriction.  Weight loss.  Monitor daily weight.  Regular aerobic exercise.  No NSAID use.  Decrease table salt and prepackaged foods high in salt.  Continue Bumex 2mg daily and KlorCon 20mg daily as prescribed.  Renal condition will be reassessed  by Nephrology, Dr. Talavera .      5. Bilateral lower extremity edema  Assessment & Plan:  1+ pitting edema in bilateral lower extremities on assessment.  Decrease table salt and processed foods high in  salt.  Elevated lower extremities when sitting for long time.  Increase activity as tolerated.  He is going to [facility] for edema treatment the first week of June, 2023.        6. Cardiac murmur  Assessment & Plan:  Heart murmur (3/6) on assessment.  Asymptomatic.  Will continue to monitor.          Follow Up   Return in about 3 months (around 8/23/2023) for Next scheduled follow up HTN, HLD, DM, Edema.  Patient was given instructions and counseling regarding his condition or for health maintenance advice. Please see specific information pulled into the AVS if appropriate.

## 2023-06-04 PROBLEM — R60.0 BILATERAL LOWER EXTREMITY EDEMA: Status: ACTIVE | Noted: 2023-06-04

## 2023-06-04 NOTE — ASSESSMENT & PLAN NOTE
Renal condition is  stable .  Continue current treatment regimen.  Fluid restriction.  Weight loss.  Monitor daily weight.  Regular aerobic exercise.  No NSAID use.  Decrease table salt and prepackaged foods high in salt.  Continue Bumex 2mg daily and KlorCon 20mg daily as prescribed.  Renal condition will be reassessed  by Nephrology, Dr. Talavera .

## 2023-06-04 NOTE — ASSESSMENT & PLAN NOTE
Lipid abnormalities are improving with treatment.  Nutritional counseling was provided.  Continue Atorvastatin 40mg nightly.  Lipids will be reassessed in 3 months.

## 2023-06-04 NOTE — ASSESSMENT & PLAN NOTE
Diabetes is worsening.  A1c 6.5, up from A1c 5.6 on prior labs from 4/29/22.  Weight today 315lbs increased from 289lbs on 3/23/23; weighed on different scale so not sure if weight gain is 100% accurate based on using different scale.  Continue current treatment regimen.  Reminded to bring in blood sugar diary at next visit.  Dietary recommendations for ADA diet.  Regular aerobic exercise.  Discussed ways to avoid symptomatic hypoglycemia.  Discussed foot care.  Reminded to get yearly retinal exam.  Continue Glipizide 5mg BID and Tradjenta 5mg daily.  Diabetes will be reassessed in 3 months.

## 2023-06-04 NOTE — ASSESSMENT & PLAN NOTE
1+ pitting edema in bilateral lower extremities on assessment.  Decrease table salt and processed foods high in salt.  Elevated lower extremities when sitting for long time.  Increase activity as tolerated.  He is going to [facility] for edema treatment the first week of June, 2023.

## 2023-06-04 NOTE — ASSESSMENT & PLAN NOTE
Hypertension is improving with treatment.  Continue current treatment regimen.  Dietary sodium restriction.  Weight loss.  Regular aerobic exercise.  Stop smoking.  Continue Amlodipine 10 mg daily, Hydralazine 100mg BID, and Metoprolol Tartrate 25 mg BID.  Blood pressure will be reassessed in 3 months.

## 2023-07-11 ENCOUNTER — TELEPHONE (OUTPATIENT)
Dept: FAMILY MEDICINE CLINIC | Facility: CLINIC | Age: 78
End: 2023-07-11

## 2023-07-11 NOTE — TELEPHONE ENCOUNTER
Caller: Rock Maciel Jr.    Relationship: Self    Best call back number: [phone]     What medication are you requesting: PATIENT STATED HE TOOK A MEDICATION THAT STARTED WITH C FOR THIS BEFORE, AND IT WAS 500MG.     What are your current symptoms: PAINFUL URINATION, DARK URIN     How long have you been experiencing symptoms: 1 DAY     Have you had these symptoms before:    [x] Yes  [] No    Have you been treated for these symptoms before:   [x] Yes  [] No    If a prescription is needed, what is your preferred pharmacy and phone number: [id number] [place], [place] - [address] AT [facility] - [phone] [facility] [phone] FX

## 2023-07-13 NOTE — TELEPHONE ENCOUNTER
Nurse Dickson what's to see patient for a follow up left message to call back and schedule as available for patient hub to read and schedule

## 2023-07-14 ENCOUNTER — OFFICE VISIT (OUTPATIENT)
Dept: FAMILY MEDICINE CLINIC | Facility: CLINIC | Age: 78
End: 2023-07-14
Payer: MEDICARE

## 2023-07-14 VITALS
HEART RATE: 74 BPM | DIASTOLIC BLOOD PRESSURE: 64 MMHG | HEIGHT: 73 IN | BODY MASS INDEX: 41.35 KG/M2 | RESPIRATION RATE: 16 BRPM | OXYGEN SATURATION: 98 % | WEIGHT: 312 LBS | SYSTOLIC BLOOD PRESSURE: 130 MMHG

## 2023-07-14 DIAGNOSIS — N30.00 ACUTE CYSTITIS WITHOUT HEMATURIA: Primary | ICD-10-CM

## 2023-07-14 DIAGNOSIS — R01.1 CARDIAC MURMUR: ICD-10-CM

## 2023-07-14 DIAGNOSIS — R82.998 DARK URINE: ICD-10-CM

## 2023-07-14 LAB
BILIRUB BLD-MCNC: NEGATIVE MG/DL
CLARITY, POC: CLEAR
COLOR UR: ABNORMAL
EXPIRATION DATE: ABNORMAL
GLUCOSE UR STRIP-MCNC: NEGATIVE MG/DL
KETONES UR QL: NEGATIVE
LEUKOCYTE EST, POC: ABNORMAL
Lab: ABNORMAL
NITRITE UR-MCNC: NEGATIVE MG/ML
PH UR: 5.5 [PH] (ref 5–8)
PROT UR STRIP-MCNC: ABNORMAL MG/DL
RBC # UR STRIP: NEGATIVE /UL
SP GR UR: 1.02 (ref 1–1.03)
UROBILINOGEN UR QL: ABNORMAL

## 2023-07-14 PROCEDURE — 3078F DIAST BP <80 MM HG: CPT | Performed by: NURSE PRACTITIONER

## 2023-07-14 PROCEDURE — 99213 OFFICE O/P EST LOW 20 MIN: CPT | Performed by: NURSE PRACTITIONER

## 2023-07-14 PROCEDURE — 3075F SYST BP GE 130 - 139MM HG: CPT | Performed by: NURSE PRACTITIONER

## 2023-07-14 PROCEDURE — 81003 URINALYSIS AUTO W/O SCOPE: CPT | Performed by: NURSE PRACTITIONER

## 2023-07-14 NOTE — PROGRESS NOTES
"Chief Complaint  Hypertension (Follow up )    Subjective     {Problem List  Visit Diagnosis   Encounters  Notes  Medications  Labs  Result Review Imaging  Media :23}     Rock Maciel Jr. presents to [facility] for  History of Present Illness    Review of Systems      Objective   Vital Signs:   /64 (BP Location: Right arm, Patient Position: Sitting, Cuff Size: Adult)   Pulse 74   Resp 16   Ht 185.4 cm (72.99\")   Wt (!) 142 kg (312 lb)   SpO2 98%   BMI 41.17 kg/m²     Physical Exam   Result Review :{Labs  Result Review  Imaging  Med Tab  Media :23}   {The following data was reviewed by (Optional):60079}  {Ambulatory Labs (Optional):48505}  {Data reviewed (Optional):31044:::1}          Assessment and Plan {CC Problem List  Visit Diagnosis  ROS  Review (Popup)  Health Maintenance  Quality  BestPractice  Medications  SmartSets  SnapShot Encounters  Media :23}   There are no diagnoses linked to this encounter.  {Time Spent (Optional):43530}  Follow Up {Instructions Charge Capture  Follow-up Communications :23}  No follow-ups on file.  Patient was given instructions and counseling regarding his condition or for health maintenance advice. Please see specific information pulled into the AVS if appropriate.   "

## 2023-07-14 NOTE — PROGRESS NOTES
"Chief Complaint  Dark Urine    Subjective          Rock Maciel Jr. presents to [facility] for  History of Present Illness  He is here with complaint of dark urine 2 days ago with burning, frequency and urgency with urination.  He had a dental appointment this week on Tuesday and had to take Amoxicillin 500mg 4 capsules on Tuesday prior to his dental procedure.  His urinary symptoms started to get better so took another 4 doses of Amoxicillin on Wednesday and 2 capusles on Thursday.  Today, he reports urine is yellow and is feeling better.  He has not taken any more doses of Amoxicillin.      Review of Systems   Constitutional:  Negative for chills and fever.   Respiratory:  Negative for shortness of breath.    Cardiovascular:  Negative for chest pain and palpitations.   Genitourinary:  Positive for difficulty urinating, dysuria, frequency and urgency. Negative for decreased urine volume, flank pain, hematuria, penile discharge, penile pain, penile swelling, scrotal swelling and testicular pain.        Feeling better since taking Amoxicillin       Objective   Vital Signs:   /64 (BP Location: Right arm, Patient Position: Sitting, Cuff Size: Adult)   Pulse 74   Resp 16   Ht 185.4 cm (72.99\")   Wt (!) 142 kg (312 lb)   SpO2 98%   BMI 41.17 kg/m²     Physical Exam  Vitals and nursing note reviewed.   Constitutional:       Appearance: Normal appearance.   HENT:      Head: Normocephalic and atraumatic.   Eyes:      Conjunctiva/sclera: Conjunctivae normal.   Cardiovascular:      Rate and Rhythm: Normal rate and regular rhythm.      Heart sounds: Murmur heard.   Systolic murmur is present with a grade of 3/6.   Pulmonary:      Effort: Pulmonary effort is normal. No respiratory distress.      Breath sounds: Normal breath sounds.   Abdominal:      Tenderness: There is no abdominal tenderness. There is no right CVA tenderness or left CVA tenderness.   Genitourinary:     Comments: " Deferred, no genital symptoms.  Skin:     General: Skin is warm and dry.   Neurological:      Mental Status: He is alert.      Result Review :               Assessment and Plan    Diagnoses and all orders for this visit:    1. Acute cystitis without hematuria (Primary)  Comments:  C/O brown urine w/dysuria, frequency, urgency.  Lab:  U/A result = + Leuk/2+ protein.  Lab:  Urine Cx  Will call with result & plan  Orders:  -     Urine Culture - Urine, Urine, Clean Catch    2. Dark urine  -     POCT urinalysis dipstick, automated    3. Cardiac murmur  Assessment & Plan:  Heart murmur (3/6) on assessment.  Asymptomatic.  O2Sat 98%.  Will continue to monitor.      Patient reports he has already taken 10 Amoxicillin capsules (from his prescription for pre-dental procedure) over the past 3 days.  Today he is asymptomatic.  Will check Urine Culture to make sure his urine is clear from bacteria.  Discussed with patient at this time will not prescribe an antibiotic as he has just finished taking Amoxicillin.  Informed patient that if he begins to feel urinary symptoms of dark urine, frequency, urgency pain with urination, difficulty urinating, fever, chills, low back pain, or any other symptom he should make appointment to be see.  If his symptoms are more life threatening to go to the nearest emergency room.      Follow Up   Return for Next scheduled follow up - has standing appointment.  Patient was given instructions and counseling regarding his condition or for health maintenance advice. Please see specific information pulled into the AVS if appropriate.

## 2023-07-16 LAB
BACTERIA UR CULT: NORMAL
BACTERIA UR CULT: NORMAL

## 2023-08-23 ENCOUNTER — OFFICE VISIT (OUTPATIENT)
Dept: FAMILY MEDICINE CLINIC | Facility: CLINIC | Age: 78
End: 2023-08-23
Payer: MEDICARE

## 2023-08-23 VITALS
HEIGHT: 73 IN | OXYGEN SATURATION: 96 % | WEIGHT: 307 LBS | DIASTOLIC BLOOD PRESSURE: 72 MMHG | HEART RATE: 67 BPM | SYSTOLIC BLOOD PRESSURE: 140 MMHG | RESPIRATION RATE: 16 BRPM | BODY MASS INDEX: 40.69 KG/M2

## 2023-08-23 DIAGNOSIS — R60.0 BILATERAL LOWER EXTREMITY EDEMA: ICD-10-CM

## 2023-08-23 DIAGNOSIS — R01.1 CARDIAC MURMUR: ICD-10-CM

## 2023-08-23 DIAGNOSIS — I10 ESSENTIAL HYPERTENSION: Primary | ICD-10-CM

## 2023-08-23 DIAGNOSIS — E11.59 TYPE 2 DIABETES MELLITUS WITH OTHER CIRCULATORY COMPLICATION, WITHOUT LONG-TERM CURRENT USE OF INSULIN: ICD-10-CM

## 2023-08-23 DIAGNOSIS — N18.32 STAGE 3B CHRONIC KIDNEY DISEASE: ICD-10-CM

## 2023-08-23 DIAGNOSIS — E78.5 HYPERLIPIDEMIA, UNSPECIFIED HYPERLIPIDEMIA TYPE: ICD-10-CM

## 2023-08-23 RX ORDER — MELATONIN
1000 DAILY
COMMUNITY

## 2023-08-23 NOTE — PROGRESS NOTES
Chief Complaint  Hypertension (3 month follow up )    Subjective          Rock Maciel Jr. presents to [facility] for  History of Present Illness  He is here to follow-up on HYPERTENSION, Diabetes Type 2, and Hyperlipidemia:    HYPERTENSION - he currently takes Amlodipine 10 mg daily, Hydralazine 100mg TID, Metoprolol Tartrate 25 mg BID.  He denies SOB, chest pain, heart palpitations, lightheadedness and dizziness.    HYPERLIPIDEMIA - he is currently taking Atorvastatin 20 mg nightly.  He denies myalgia.    DMT2 - he is currently taking Glipizide 5mg BID, Tradjenta 5mg daily.  He checks his blood sugar fasting first thing in the morning.  His blood glucose level ranges between 130-150.  He has been working on decreasing sugar/carbs and increasing activity.  He reports has been eating sugar free ice cream and ice cream drumsticks nightly.  He has history of 4th & 5th toe amputation on right foot and history of all toes amputated on left foot and has shoe inserts now for his shoes.    CKD Stage 3b - he is currently taking Bumex 2 mg daily and KlorCon 20mg daily.  He sees Nephrologist, Dr. Les Talavera, every 3 months.  Nephrology would like to keep his BP below 130/80.    BLE Edema - bilateral lower extremity edema that has improved.  He was going to [facility] for edema treatment and they have signed off and told him to call them as needed if symptoms worsen.  He was also fitted for compression stockings.    Carotid Endarterectomy - he is currently taking Plavix daily.    He has not seen Cardiology for over 1 year.    Continue all meds as directed.          Review of Systems   Respiratory:  Negative for shortness of breath.    Cardiovascular:  Positive for leg swelling. Negative for chest pain and palpitations.        Mild swelling in BLE   Endocrine: Negative for polydipsia, polyphagia and polyuria.   Musculoskeletal:  Negative for myalgias.   Neurological:  Negative for  "dizziness and light-headedness.       Objective   Vital Signs:   /72 (BP Location: Right arm, Patient Position: Sitting, Cuff Size: Adult)   Pulse 67   Resp 16   Ht 185.4 cm (72.99\")   Wt (!) 139 kg (307 lb)   SpO2 96%   BMI 40.51 kg/m²     Physical Exam  Vitals and nursing note reviewed.   Constitutional:       General: He is not in acute distress.     Appearance: Normal appearance.   HENT:      Head: Normocephalic and atraumatic.   Eyes:      Conjunctiva/sclera: Conjunctivae normal.   Cardiovascular:      Rate and Rhythm: Normal rate and regular rhythm.      Heart sounds: Murmur heard.   Systolic murmur is present with a grade of 3/6.      Comments: BLE trace non-pitting edema  Pulmonary:      Effort: Pulmonary effort is normal. No respiratory distress.      Breath sounds: Normal breath sounds. No wheezing.   Musculoskeletal:      Right lower leg: No edema.      Left lower leg: No edema.   Skin:     General: Skin is warm and dry.      Findings: No erythema.   Neurological:      Mental Status: He is alert.      Result Review :   The following data was reviewed by: ZEINA Mccormack on 08/23/2023:  CMP          3/23/2023    14:45 8/23/2023    11:40   CMP   Glucose 130  139    BUN 49  41    Creatinine 1.72  1.70    Sodium 140  142    Potassium 4.9  4.7    Chloride 102  103    Calcium 9.7  9.0    Total Protein 8.2  7.5    Albumin 4.7  4.5    Globulin 3.5  3.0    Total Bilirubin 0.4  0.4    Alkaline Phosphatase 144  121    AST (SGOT) 11  13    ALT (SGPT) 12  10    BUN/Creatinine Ratio 28.5  24.1      CBC w/diff          3/23/2023    14:45 8/23/2023    11:40   CBC w/Diff   WBC 8.04  7.24    RBC 4.62  4.23    Hemoglobin 13.3  12.7    Hematocrit 41.3  39.0    MCV 89.4  92.2    MCH 28.8  30.0    MCHC 32.2  32.6    RDW 12.9  13.6    Platelets 273  221    Neutrophil Rel % 58.4  70.0    Lymphocyte Rel % 32.0  20.3    Monocyte Rel % 6.3  5.8    Eosinophil Rel % 3.0  3.0    Basophil Rel % 0.1  0.6      Lipid " Panel          3/23/2023    14:45 8/23/2023    11:40   Lipid Panel   Total Cholesterol 185  153    Triglycerides 149  133    HDL Cholesterol 46  43    VLDL Cholesterol 26  24    LDL Cholesterol  113  86        Data reviewed : Consultant notes Omar Rodrigues 8/10/23; Dr. Sarita Toney 7/3/23; [facility] 6/19/23;           Assessment and Plan    Diagnoses and all orders for this visit:    1. Essential hypertension (Primary)  Assessment & Plan:  Hypertension is  stable .  Continue current treatment regimen.  Dietary sodium restriction.  Weight loss.  Regular aerobic exercise.  Continue Amlodipine 10 mg daily, Hydralazine 100mg TID, Metoprolol Tartrate 25 mg BID.   Labs: CBC, CMP  Blood pressure will be reassessed in 3 months.    Orders:  -     CBC & Differential  -     Comprehensive Metabolic Panel    2. Hyperlipidemia, unspecified hyperlipidemia type  Assessment & Plan:  Lipid abnormalities are improving with treatment from labs on 3/2023.  Nutritional counseling was provided.  Labs:  Lipid Panel  Continue Atorvastatin 20mg nightly.  Lipids will be reassessed  today .    Orders:  -     Lipid Panel    3. Type 2 diabetes mellitus with other circulatory complication, without long-term current use of insulin  Assessment & Plan:  Diabetes is  stable; A1c 6.5 on labs from 3/2023 .   Continue current treatment regimen.  Reminded to bring in blood sugar diary at next visit.  Dietary recommendations for ADA diet.  Regular aerobic exercise.  Discussed ways to avoid symptomatic hypoglycemia.  Discussed foot care.  Reminded to get yearly retinal exam.  Lab:  CMP, A1c  Continue Glipizide 5mg BID, Tradjenta 5mg daily.  Diabetes will be reassessed  today .    Orders:  -     CBC & Differential  -     Comprehensive Metabolic Panel  -     Hemoglobin A1c    4. Cardiac murmur  Assessment & Plan:  Heart Murmur 3/6; Asymptomatic; O2sat 96%.  Has not seen Cardiology in over 1 year.  Referral to Cardiology for evaluation and  treatment.      Orders:  -     Ambulatory Referral to Cardiology    5. Stage 3b chronic kidney disease  Assessment & Plan:  Renal condition is  elevated; GFR 40.2 and Creatinine 1.72 from labs in 3/2023 .  Continue current treatment regimen.  Fluid restriction.  Weight loss.  Monitor daily weight.  Regular aerobic exercise.  Labs:  CMP, CBC  Continue Bumex 2 mg daily and KlorCon 20mg daily.  Renal condition will be reassessed  today .      6. Bilateral lower extremity edema  Comments:  BLE trace non-pitting edema.  Decrease table salt.  Elevate BLE when sitting.  Increase activity as tolerated.        Follow Up   Return in about 3 months (around 11/23/2023) for Next scheduled follow up  Chronic Care.  Patient was given instructions and counseling regarding his condition or for health maintenance advice. Please see specific information pulled into the AVS if appropriate.

## 2023-08-24 LAB
ALBUMIN SERPL-MCNC: 4.5 G/DL (ref 3.5–5.2)
ALBUMIN/GLOB SERPL: 1.5 G/DL
ALP SERPL-CCNC: 121 U/L (ref 39–117)
ALT SERPL-CCNC: 10 U/L (ref 1–41)
AST SERPL-CCNC: 13 U/L (ref 1–40)
BASOPHILS # BLD AUTO: 0.04 10*3/MM3 (ref 0–0.2)
BASOPHILS NFR BLD AUTO: 0.6 % (ref 0–1.5)
BILIRUB SERPL-MCNC: 0.4 MG/DL (ref 0–1.2)
BUN SERPL-MCNC: 41 MG/DL (ref 8–23)
BUN/CREAT SERPL: 24.1 (ref 7–25)
CALCIUM SERPL-MCNC: 9 MG/DL (ref 8.6–10.5)
CHLORIDE SERPL-SCNC: 103 MMOL/L (ref 98–107)
CHOLEST SERPL-MCNC: 153 MG/DL (ref 0–200)
CO2 SERPL-SCNC: 26.7 MMOL/L (ref 22–29)
CREAT SERPL-MCNC: 1.7 MG/DL (ref 0.76–1.27)
EGFRCR SERPLBLD CKD-EPI 2021: 40.5 ML/MIN/1.73
EOSINOPHIL # BLD AUTO: 0.22 10*3/MM3 (ref 0–0.4)
EOSINOPHIL NFR BLD AUTO: 3 % (ref 0.3–6.2)
ERYTHROCYTE [DISTWIDTH] IN BLOOD BY AUTOMATED COUNT: 13.6 % (ref 12.3–15.4)
GLOBULIN SER CALC-MCNC: 3 GM/DL
GLUCOSE SERPL-MCNC: 139 MG/DL (ref 65–99)
HBA1C MFR BLD: 7.1 % (ref 4.8–5.6)
HCT VFR BLD AUTO: 39 % (ref 37.5–51)
HDLC SERPL-MCNC: 43 MG/DL (ref 40–60)
HGB BLD-MCNC: 12.7 G/DL (ref 13–17.7)
IMM GRANULOCYTES # BLD AUTO: 0.02 10*3/MM3 (ref 0–0.05)
IMM GRANULOCYTES NFR BLD AUTO: 0.3 % (ref 0–0.5)
LDLC SERPL CALC-MCNC: 86 MG/DL (ref 0–100)
LYMPHOCYTES # BLD AUTO: 1.47 10*3/MM3 (ref 0.7–3.1)
LYMPHOCYTES NFR BLD AUTO: 20.3 % (ref 19.6–45.3)
MCH RBC QN AUTO: 30 PG (ref 26.6–33)
MCHC RBC AUTO-ENTMCNC: 32.6 G/DL (ref 31.5–35.7)
MCV RBC AUTO: 92.2 FL (ref 79–97)
MONOCYTES # BLD AUTO: 0.42 10*3/MM3 (ref 0.1–0.9)
MONOCYTES NFR BLD AUTO: 5.8 % (ref 5–12)
NEUTROPHILS # BLD AUTO: 5.07 10*3/MM3 (ref 1.7–7)
NEUTROPHILS NFR BLD AUTO: 70 % (ref 42.7–76)
NRBC BLD AUTO-RTO: 0 /100 WBC (ref 0–0.2)
PLATELET # BLD AUTO: 221 10*3/MM3 (ref 140–450)
POTASSIUM SERPL-SCNC: 4.7 MMOL/L (ref 3.5–5.2)
PROT SERPL-MCNC: 7.5 G/DL (ref 6–8.5)
RBC # BLD AUTO: 4.23 10*6/MM3 (ref 4.14–5.8)
SODIUM SERPL-SCNC: 142 MMOL/L (ref 136–145)
TRIGL SERPL-MCNC: 133 MG/DL (ref 0–150)
VLDLC SERPL CALC-MCNC: 24 MG/DL (ref 5–40)
WBC # BLD AUTO: 7.24 10*3/MM3 (ref 3.4–10.8)

## 2023-09-22 PROBLEM — N18.30 CKD (CHRONIC KIDNEY DISEASE) STAGE 3, GFR 30-59 ML/MIN: Status: ACTIVE | Noted: 2023-09-22

## 2023-09-23 NOTE — ASSESSMENT & PLAN NOTE
Lipid abnormalities are improving with treatment from labs on 3/2023.  Nutritional counseling was provided.  Labs:  Lipid Panel  Continue Atorvastatin 20mg nightly.  Lipids will be reassessed  today .

## 2023-09-23 NOTE — ASSESSMENT & PLAN NOTE
Heart Murmur 3/6; Asymptomatic; O2sat 96%.  Has not seen Cardiology in over 1 year.  Referral to Cardiology for evaluation and treatment.

## 2023-09-23 NOTE — ASSESSMENT & PLAN NOTE
Diabetes is  stable; A1c 6.5 on labs from 3/2023 .   Continue current treatment regimen.  Reminded to bring in blood sugar diary at next visit.  Dietary recommendations for ADA diet.  Regular aerobic exercise.  Discussed ways to avoid symptomatic hypoglycemia.  Discussed foot care.  Reminded to get yearly retinal exam.  Lab:  CMP, A1c  Continue Glipizide 5mg BID, Tradjenta 5mg daily.  Diabetes will be reassessed  today .

## 2023-09-23 NOTE — ASSESSMENT & PLAN NOTE
Hypertension is  stable .  Continue current treatment regimen.  Dietary sodium restriction.  Weight loss.  Regular aerobic exercise.  Continue Amlodipine 10 mg daily, Hydralazine 100mg TID, Metoprolol Tartrate 25 mg BID.   Labs: CBC, CMP  Blood pressure will be reassessed in 3 months.

## 2023-09-23 NOTE — ASSESSMENT & PLAN NOTE
Renal condition is  elevated; GFR 40.2 and Creatinine 1.72 from labs in 3/2023 .  Continue current treatment regimen.  Fluid restriction.  Weight loss.  Monitor daily weight.  Regular aerobic exercise.  Labs:  CMP, CBC  Continue Bumex 2 mg daily and KlorCon 20mg daily.  Renal condition will be reassessed  today .

## 2023-09-29 ENCOUNTER — TELEPHONE (OUTPATIENT)
Dept: FAMILY MEDICINE CLINIC | Facility: CLINIC | Age: 78
End: 2023-09-29
Payer: MEDICARE

## 2023-09-29 NOTE — TELEPHONE ENCOUNTER
Name: Rock Maciel Jr.  Relationship: Self  Best Callback Number: [phone]  HUB PROVIDED THE RELAY MESSAGE FROM THE OFFICE  PATIENT {CHOOSE THE CORRECT RESULT:[id number]  ADDITIONAL INFORMATION: RESCHEDULED PATIENT

## 2023-10-30 ENCOUNTER — OFFICE VISIT (OUTPATIENT)
Dept: CARDIOLOGY | Facility: CLINIC | Age: 78
End: 2023-10-30
Payer: MEDICARE

## 2023-10-30 VITALS
HEART RATE: 68 BPM | HEIGHT: 73 IN | WEIGHT: 301 LBS | BODY MASS INDEX: 39.89 KG/M2 | SYSTOLIC BLOOD PRESSURE: 180 MMHG | DIASTOLIC BLOOD PRESSURE: 80 MMHG

## 2023-10-30 DIAGNOSIS — I10 ESSENTIAL HYPERTENSION: ICD-10-CM

## 2023-10-30 DIAGNOSIS — I25.10 ARTERIOSCLEROSIS OF CORONARY ARTERY: ICD-10-CM

## 2023-10-30 DIAGNOSIS — R94.31 ABNORMAL ECG: Primary | ICD-10-CM

## 2023-10-30 PROCEDURE — 3079F DIAST BP 80-89 MM HG: CPT | Performed by: INTERNAL MEDICINE

## 2023-10-30 PROCEDURE — 99203 OFFICE O/P NEW LOW 30 MIN: CPT | Performed by: INTERNAL MEDICINE

## 2023-10-30 PROCEDURE — 93000 ELECTROCARDIOGRAM COMPLETE: CPT | Performed by: INTERNAL MEDICINE

## 2023-10-30 PROCEDURE — 3077F SYST BP >= 140 MM HG: CPT | Performed by: INTERNAL MEDICINE

## 2023-10-30 NOTE — PROGRESS NOTES
NP REFER BY JESUS BRICENO  MURMUR    Subjective:        [facility]  Cardiology Consult Note    Patient Identification:  Name: Rock Maciel Jr.  Age: 78 y.o.  Sex: male  :  1945  MRN: [id number]             CC  Last seen 2018    AVR    CABG      History of Present Illness:   78-year-old male last seen 5 years ago with aortic valve replacement and CABG here for the follow-up as the primary care physician heard a murmur    Comorbid cardiac risk factors:     Past Medical History:  Past Medical History:   Diagnosis Date    Allergic rhinitis     Bronchitis     Coronary artery disease     Diabetes mellitus     DM (diabetes mellitus)     Encounter for annual health examination 2014    Annual Health Assessment    Gout     Hiatal hernia     History of MRSA infection     RIGHT FOOT     Hyperlipidemia     Hypertension     Murmur     Pneumothorax on right     Sleep apnea     pt wears CPAP at night    Wellness examination 2015    Annual Wellness Visit     Past Surgical History:  Past Surgical History:   Procedure Laterality Date    ARTERY SURGERY Bilateral     carotid    CARDIAC CATHETERIZATION N/A 2018    Procedure: Left Heart Cath;  Surgeon: Jo Toney MD;  Location: [facility] INVASIVE LOCATION;  Service: Cardiovascular    CARDIAC CATHETERIZATION N/A 2018    Procedure: Coronary angiography;  Surgeon: Jo Toney MD;  Location: [facility] CATH INVASIVE LOCATION;  Service: Cardiovascular    CAROTID ENDARTERECTOMY Bilateral     COLONOSCOPY      CORONARY ARTERY BYPASS GRAFT WITH AORTIC VALVE REPAIR/REPLACEMENT N/A 2018    Procedure: INTRAOPERATIVE ALEX, MIDLINE STERNOTOMY, CORONARY ARTERY BYPASS GRAFTING X 3 USING ENDOSCOPICALLY HARVESTED LEFT GREATER SAPHENOUS VEIN,  AORTIC VALVE REPLACEMENT USING 25MM LOPEZ II ULTRA PORCINE VALVE, PRP;  Surgeon: Phong Posey MD;  Location: [facility] OR;  Service: Cardiothoracic    FOOT SURGERY Right      5th digit removal    FOOT SURGERY Left 2011    1 digit removed    INCISION AND DRAINAGE LEG Right 3/28/2020    Procedure: DEBRIDMENT OF RIGHT CALF;  Surgeon: Ross Pineda MD;  Location: [facility] MAIN OR;  Service: Vascular;  Laterality: Right;    QUADRICEPS TENDON REPAIR Left 5/14/2019    Procedure: Left QUADRICEPS TENDON REPAIR;  Surgeon: Camilo Hunter MD;  Location: [facility] MAIN OR;  Service: Orthopedics    THORACENTESIS Right 11/21/2016    THORACOSCOPY Right 5/8/2017    Procedure: BRONCHOSCOPY, RIGHT VAT,  TOTAL DECORTICATION RIGHT LUNG, PLEURAL BX, PLACEMENT SUBPLEURAL PAIN CAATHETERS X2;  Surgeon: Donald Orlando III, MD;  Location: [facility] OR;  Service:     TONSILECTOMY, ADENOIDECTOMY, BILATERAL MYRINGOTOMY AND TUBES        Allergies:  Allergies   Allergen Reactions    Ceclor [Cefaclor] Rash     Rash in his 50s; he tolerated piperacillin-tazobactam in March 2020     Home Meds:  (Not in a hospital admission)    Current Meds:     Current Outpatient Medications:     amLODIPine (NORVASC) 10 MG tablet, Take 1 tablet by mouth Daily., Disp: 90 tablet, Rfl: 1    atorvastatin (LIPITOR) 20 MG tablet, Take 1 tablet by mouth Every Night., Disp: 90 tablet, Rfl: 1    bumetanide (BUMEX) 2 MG tablet, Take 1 tablet by mouth Daily., Disp: 90 tablet, Rfl: 1    Cholecalciferol 25 MCG (1000 UT) tablet, Take 1 tablet by mouth Daily., Disp: , Rfl:     clopidogrel (PLAVIX) 75 MG tablet, Take 1 tablet by mouth Daily., Disp: 90 tablet, Rfl: 1    glipizide (GLUCOTROL) 5 MG tablet, Take 1 tablet by mouth 2 (Two) Times a Day Before Meals., Disp: 180 tablet, Rfl: 1    glucose blood (Accu-Chek Keshia Plus) test strip, Use to test blood sugar twice daily for diabetes, Disp: 100 each, Rfl: 11    hydrALAZINE (APRESOLINE) 100 MG tablet, Take 3 tablets by mouth 2 (Two) Times a Day., Disp: , Rfl:     linagliptin (Tradjenta) 5 MG tablet tablet, Take 1 tablet by mouth Daily., Disp: 90 tablet, Rfl: 1    metoprolol tartrate  (LOPRESSOR) 25 MG tablet, Take 1 tablet by mouth 2 (Two) Times a Day., Disp: 180 tablet, Rfl: 1    potassium chloride (KLOR-CON) 20 MEQ CR tablet, Take 1 tablet by mouth Daily., Disp: 90 tablet, Rfl: 1    vitamin C (ASCORBIC ACID) 250 MG tablet, Take 1 tablet by mouth Daily., Disp: , Rfl:     Zinc 50 MG tablet, , Disp: , Rfl:     hydrALAZINE (APRESOLINE) 50 MG tablet, TAKE 1 TABLET EVERY 8 HOURS, Disp: 270 tablet, Rfl: 1  Social History:   Social History     Tobacco Use    Smoking status: Never     Passive exposure: Never    Smokeless tobacco: Never   Substance Use Topics    Alcohol use: Yes     Comment: either one glass wine or one glass liquor per  day      Family History:  Family History   Problem Relation Age of Onset    Hypertension Father         Review of Systems    Constitutional: No weakness,fatigue, fever, rigors, chills   Eyes: No vision changes, eye pain   ENT/oropharynx: No difficulty swallowing, sore throat, epistaxis, changes in hearing   Cardiovascular: No chest pain, chest tightness, palpitations, paroxysmal nocturnal dyspnea, orthopnea, diaphoresis, dizziness / syncopal episode   Respiratory: Moderate shortness of breath, dyspnea on exertion, cough, wheezing hemoptysis   Gastrointestinal: No abdominal pain, nausea, vomiting, diarrhea, bloody stools   Genitourinary: No hematuria, dysuria   Neurological: No headache, tremors, numbness,  one-sided weakness    Musculoskeletal: No cramps, myalgias,  joint pain, joint swelling   Integument: No rash, edema           Constitutional:  Heart Rate:  [68] 68  BP: (180)/(80) 180/80    Physical Exam   General:  Appears in no acute distress  Eyes: PERTL,  HEENT:  No JVD. Thyroid not visibly enlarged. No mucosal pallor or cyanosis  Respiratory: Respirations regular and unlabored at rest. BBS with good air entry in all fields. No crackles, rubs or wheezes auscultated  Cardiovascular: S1S2 Regular rate and rhythm. No murmur, rub or gallop auscultated. No carotid  bruits. DP/PT pulses    . No pretibial pitting edema  Gastrointestinal: Abdomen soft, flat, non tender. Bowel sounds present. No hepatosplenomegaly. No ascites  Musculoskeletal: MARSHALL x4. No abnormal movements  Extremities: No digital clubbing or cyanosis  Skin: Color pink. Skin warm and dry to touch. No rashes  No xanthoma  Neuro: AAO x3 CN II-XII grossly intact            ECG 12 Lead    Date/Time: 10/30/2023 1:31 PM  Performed by: Jo Toney MD    Authorized by: Jo Toney MD  Comparison: compared with previous ECG   Similar to previous ECG  Rhythm: sinus rhythm    Clinical impression: abnormal EKG            Cardiographics  ECG:     Telemetry:    Echocardiogram:     Imaging  Chest X-ray:     Lab Review               @[email or web address]@              Assessment:/ Recommendations / Plan:     * No active hospital problems. *                   ICD-10-CM ICD-9-CM   1. Abnormal ECG  R94.31 794.31   2. Arteriosclerosis of coronary artery  I25.10 414.00   3. Essential hypertension  I10 401.9     1. Abnormal ECG  Considering the patient's symptoms as well as clinical situation and  EKG findings, along with cardiac risk factors, ischemic workup is necessary to rule out ischemic cardiomyopathy, stress induced arrhythmias, and functional capacity for diagnosis as well as prognostic consideration    - Stress Test With Myocardial Perfusion One Day  - Adult Transthoracic Echo Complete W/ Cont if Necessary Per Protocol    2. Arteriosclerosis of coronary artery  Considering patient's medical condition as well as the risk factors, patient will require echocardiogram for further evaluation for the LV function, four-chamber evaluation, including the pressures, valvular function and  pericardial disease and pericardial effusion    - Stress Test With Myocardial Perfusion One Day  - Adult Transthoracic Echo Complete W/ Cont if Necessary Per Protocol    3. Essential hypertension  Continue current treatment  - Stress Test  With Myocardial Perfusion One Day  - Adult Transthoracic Echo Complete W/ Cont if Necessary Per Protocol      ECHO, PAL    FOLLOW UP    PAL, ECHO    FOLLOW UP    Labs/tests ordered for am      Jo Toney MD  10/31/2023, 09:54 EDT      EMR Dragon/Transcription:   Dictated utilizing Dragon dictation

## 2023-11-03 ENCOUNTER — PATIENT ROUNDING (BHMG ONLY) (OUTPATIENT)
Dept: CARDIOLOGY | Facility: CLINIC | Age: 78
End: 2023-11-03
Payer: MEDICARE

## 2023-11-03 NOTE — PROGRESS NOTES
November 3, 2023    Tell me about your visit with us. What things went well?         We're always looking for ways to make our patients' experiences even better. Do you have recommendations on ways we may improve?        Overall were you satisfied with your first visit to our practice?        I appreciate you taking the time to speak with me today. Is there anything else I can do for you?       Thank you, and have a great day.

## 2023-11-08 ENCOUNTER — HOSPITAL ENCOUNTER (OUTPATIENT)
Dept: CARDIOLOGY | Facility: HOSPITAL | Age: 78
Discharge: HOME | End: 2023-11-08
Admitting: INTERNAL MEDICINE
Payer: MEDICARE

## 2023-11-08 ENCOUNTER — HOSPITAL ENCOUNTER (OUTPATIENT)
Dept: CARDIOLOGY | Facility: HOSPITAL | Age: 78
Discharge: HOME | End: 2023-11-08
Payer: MEDICARE

## 2023-11-08 VITALS — HEIGHT: 73 IN | WEIGHT: 301 LBS | BODY MASS INDEX: 39.89 KG/M2

## 2023-11-08 VITALS
SYSTOLIC BLOOD PRESSURE: 170 MMHG | BODY MASS INDEX: 39.89 KG/M2 | DIASTOLIC BLOOD PRESSURE: 70 MMHG | WEIGHT: 301 LBS | OXYGEN SATURATION: 95 % | HEIGHT: 73 IN | HEART RATE: 69 BPM

## 2023-11-08 PROCEDURE — 93306 TTE W/DOPPLER COMPLETE: CPT

## 2023-11-08 PROCEDURE — 0 TECHNETIUM SESTAMIBI: Performed by: INTERNAL MEDICINE

## 2023-11-08 PROCEDURE — 25010000002 REGADENOSON 0.4 MG/5ML SOLUTION: Performed by: INTERNAL MEDICINE

## 2023-11-08 PROCEDURE — 93306 TTE W/DOPPLER COMPLETE: CPT | Performed by: INTERNAL MEDICINE

## 2023-11-08 PROCEDURE — 78452 HT MUSCLE IMAGE SPECT MULT: CPT

## 2023-11-08 PROCEDURE — A9500 TC99M SESTAMIBI: HCPCS | Performed by: INTERNAL MEDICINE

## 2023-11-08 PROCEDURE — 25510000001 PERFLUTREN (DEFINITY) 8.476 MG IN SODIUM CHLORIDE (PF) 0.9 % 10 ML INJECTION: Performed by: INTERNAL MEDICINE

## 2023-11-08 PROCEDURE — 93017 CV STRESS TEST TRACING ONLY: CPT

## 2023-11-08 RX ORDER — REGADENOSON 0.08 MG/ML
0.4 INJECTION, SOLUTION INTRAVENOUS
Status: COMPLETED | OUTPATIENT
Start: 2023-11-08 | End: 2023-11-08

## 2023-11-08 RX ORDER — LOSARTAN POTASSIUM 25 MG/1
TABLET ORAL
COMMUNITY
Start: 2023-11-06

## 2023-11-08 RX ADMIN — TECHNETIUM TC 99M SESTAMIBI 1 DOSE: 1 INJECTION INTRAVENOUS at 07:40

## 2023-11-08 RX ADMIN — SODIUM CHLORIDE 2 ML: 9 INJECTION INTRAMUSCULAR; INTRAVENOUS; SUBCUTANEOUS at 09:10

## 2023-11-08 RX ADMIN — REGADENOSON 0.4 MG: 0.08 INJECTION, SOLUTION INTRAVENOUS at 09:40

## 2023-11-08 RX ADMIN — TECHNETIUM TC 99M SESTAMIBI 1 DOSE: 1 INJECTION INTRAVENOUS at 09:40

## 2023-11-09 LAB
AORTIC DIMENSIONLESS INDEX: 0.5 (DI)
ASCENDING AORTA: 3.6 CM
BH CV ECHO MEAS - AI P1/2T: 305.6 MSEC
BH CV ECHO MEAS - AO MAX PG: 32.7 MMHG
BH CV ECHO MEAS - AO MEAN PG: 18 MMHG
BH CV ECHO MEAS - AO V2 MAX: 286 CM/SEC
BH CV ECHO MEAS - AO V2 VTI: 70.4 CM
BH CV ECHO MEAS - AVA(I,D): 2.13 CM2
BH CV ECHO MEAS - CONTRAST EF 4CH: 53 CM2
BH CV ECHO MEAS - EDV(CUBED): 195.1 ML
BH CV ECHO MEAS - EDV(MOD-SP2): 238 ML
BH CV ECHO MEAS - EDV(MOD-SP4): 219 ML
BH CV ECHO MEAS - EF(MOD-BP): 53.6 %
BH CV ECHO MEAS - EF(MOD-SP2): 52.5 %
BH CV ECHO MEAS - EF(MOD-SP4): 54.8 %
BH CV ECHO MEAS - ESV(CUBED): 64 ML
BH CV ECHO MEAS - ESV(MOD-SP2): 113 ML
BH CV ECHO MEAS - ESV(MOD-SP4): 99 ML
BH CV ECHO MEAS - FS: 31 %
BH CV ECHO MEAS - IVS/LVPW: 1.18 CM
BH CV ECHO MEAS - IVSD: 1.3 CM
BH CV ECHO MEAS - LV MASS(C)D: 297 GRAMS
BH CV ECHO MEAS - LV MAX PG: 6.4 MMHG
BH CV ECHO MEAS - LV MEAN PG: 3 MMHG
BH CV ECHO MEAS - LV V1 MAX: 126 CM/SEC
BH CV ECHO MEAS - LV V1 VTI: 33.2 CM
BH CV ECHO MEAS - LVIDD: 5.8 CM
BH CV ECHO MEAS - LVIDS: 4 CM
BH CV ECHO MEAS - LVOT AREA: 4.5 CM2
BH CV ECHO MEAS - LVOT DIAM: 2.4 CM
BH CV ECHO MEAS - LVPWD: 1.1 CM
BH CV ECHO MEAS - MED PEAK E' VEL: 5.7 CM/SEC
BH CV ECHO MEAS - MV A DUR: 0.13 SEC
BH CV ECHO MEAS - MV A MAX VEL: 105 CM/SEC
BH CV ECHO MEAS - MV DEC SLOPE: 616 CM/SEC2
BH CV ECHO MEAS - MV DEC TIME: 172 SEC
BH CV ECHO MEAS - MV E MAX VEL: 127 CM/SEC
BH CV ECHO MEAS - MV E/A: 1.21
BH CV ECHO MEAS - MV MAX PG: 9.7 MMHG
BH CV ECHO MEAS - MV MEAN PG: 4 MMHG
BH CV ECHO MEAS - MV P1/2T: 78.5 MSEC
BH CV ECHO MEAS - MV V2 VTI: 43.8 CM
BH CV ECHO MEAS - MVA(P1/2T): 2.8 CM2
BH CV ECHO MEAS - MVA(VTI): 3.4 CM2
BH CV ECHO MEAS - PA ACC TIME: 0.16 SEC
BH CV ECHO MEAS - PA V2 MAX: 101 CM/SEC
BH CV ECHO MEAS - PULM A REVS DUR: 0.12 SEC
BH CV ECHO MEAS - PULM A REVS VEL: 17.3 CM/SEC
BH CV ECHO MEAS - PULM DIAS VEL: 89.8 CM/SEC
BH CV ECHO MEAS - PULM S/D: 0.83
BH CV ECHO MEAS - PULM SYS VEL: 74.8 CM/SEC
BH CV ECHO MEAS - QP/QS: 0.67
BH CV ECHO MEAS - RAP SYSTOLE: 15 MMHG
BH CV ECHO MEAS - RV MAX PG: 2.33 MMHG
BH CV ECHO MEAS - RV V1 MAX: 75.1 CM/SEC
BH CV ECHO MEAS - RV V1 VTI: 17.5 CM
BH CV ECHO MEAS - RVOT DIAM: 2.7 CM
BH CV ECHO MEAS - RVSP: 50.8 MMHG
BH CV ECHO MEAS - SV(LVOT): 150.2 ML
BH CV ECHO MEAS - SV(MOD-SP2): 125 ML
BH CV ECHO MEAS - SV(MOD-SP4): 120 ML
BH CV ECHO MEAS - SV(RVOT): 100.2 ML
BH CV ECHO MEAS - TAPSE (>1.6): 2.5 CM
BH CV ECHO MEAS - TR MAX PG: 35.8 MMHG
BH CV ECHO MEAS - TR MAX VEL: 299 CM/SEC
BH CV REST NUCLEAR ISOTOPE DOSE: 10.9 MCI
BH CV STRESS BP STAGE 1: NORMAL
BH CV STRESS COMMENTS STAGE 1: NORMAL
BH CV STRESS DOSE REGADENOSON STAGE 1: 0.4
BH CV STRESS DURATION MIN STAGE 1: 1
BH CV STRESS DURATION SEC STAGE 1: 0
BH CV STRESS HR STAGE 1: 84
BH CV STRESS NUCLEAR ISOTOPE DOSE: 32.1 MCI
BH CV STRESS PROTOCOL 1: NORMAL
BH CV STRESS RECOVERY BP: NORMAL MMHG
BH CV STRESS RECOVERY HR: 72 BPM
BH CV STRESS STAGE 1: 1
BH CV VAS BP LEFT ARM: NORMAL MMHG
BH CV XLRA - RV BASE: 4 CM
LEFT ATRIUM VOLUME INDEX: 48 ML/M2
LV EF NUC BP: 50 %
MAXIMAL PREDICTED HEART RATE: 141 BPM
PERCENT MAX PREDICTED HR: 59.57 %
STRESS BASELINE BP: NORMAL MMHG
STRESS BASELINE HR: 69 BPM
STRESS O2 SAT REST: 95 %
STRESS PERCENT HR: 70 %
STRESS POST ESTIMATED WORKLOAD: 1 METS
STRESS POST EXERCISE DUR MIN: 1 MIN
STRESS POST EXERCISE DUR SEC: 0 SEC
STRESS POST PEAK BP: NORMAL MMHG
STRESS POST PEAK HR: 84 BPM
STRESS TARGET HR: 120 BPM

## 2023-11-16 ENCOUNTER — OFFICE VISIT (OUTPATIENT)
Dept: CARDIOLOGY | Facility: CLINIC | Age: 78
End: 2023-11-16
Payer: MEDICARE

## 2023-11-16 VITALS
SYSTOLIC BLOOD PRESSURE: 160 MMHG | DIASTOLIC BLOOD PRESSURE: 78 MMHG | WEIGHT: 299 LBS | HEART RATE: 72 BPM | BODY MASS INDEX: 39.63 KG/M2 | HEIGHT: 73 IN

## 2023-11-16 DIAGNOSIS — I35.0 NONRHEUMATIC AORTIC VALVE STENOSIS: ICD-10-CM

## 2023-11-16 DIAGNOSIS — I25.10 ARTERIOSCLEROSIS OF CORONARY ARTERY: Primary | ICD-10-CM

## 2023-11-16 DIAGNOSIS — I10 ESSENTIAL HYPERTENSION: ICD-10-CM

## 2023-11-16 DIAGNOSIS — E78.5 HYPERLIPIDEMIA, UNSPECIFIED HYPERLIPIDEMIA TYPE: ICD-10-CM

## 2023-11-16 PROCEDURE — 3077F SYST BP >= 140 MM HG: CPT

## 2023-11-16 PROCEDURE — 3078F DIAST BP <80 MM HG: CPT

## 2023-11-16 PROCEDURE — 99214 OFFICE O/P EST MOD 30 MIN: CPT

## 2023-11-16 NOTE — PROGRESS NOTES
Subjective:        Rock Maciel Jr. is a 78 y.o. male who here for follow up    Chief Complaint   Patient presents with    Follow-up     TEST RESULTS       HPI    This is a 78-year-old male with coronary artery disease s/p CABG times 2/20/2019, AVR 2019, hypertension, carotid artery disease s/p carotid endarterectomy, diabetes mellitus, hyperlipidemia.  He was lost to follow-up but referred back by PCP for murmur    11/8/2023 EF 51 to 55%, LV C mild to moderately dilated, normal LV diastolic function.  LAC moderately dilated, left atrial volume moderately increased.  Bioprosthetic aortic valve, paravalvular regurgitation is present in the prosthetic aortic valve.  Mean pressure gradient 18 mmHg, NEYDA 2.1. Stress test 11/8/2023 myocardial perfusion imaging indicated a small sized infarct located in the lateral wall with no significant ischemia noted.      The following portions of the patient's history were reviewed and updated as appropriate: allergies, current medications, past family history, past medical history, past social history, past surgical history and problem list.    Past Medical History:   Diagnosis Date    Allergic rhinitis     Bronchitis     Coronary artery disease     Diabetes mellitus 2001    DM (diabetes mellitus)     Encounter for annual health examination 05/06/2014    Annual Health Assessment    Gout     Hiatal hernia     History of MRSA infection     RIGHT FOOT 2010    Hyperlipidemia     Hypertension     Murmur     Pneumothorax on right     Sleep apnea     pt wears CPAP at night    Wellness examination 06/24/2015    Annual Wellness Visit         reports that he has never smoked. He has never been exposed to tobacco smoke. He has never used smokeless tobacco. He reports current alcohol use. He reports that he does not use drugs.     Family History   Problem Relation Age of Onset    Hypertension Father        ROS     Review of Systems  Constitutional: No wt loss, fever,  fatigue  Gastrointestinal: No nausea, abdominal pain  Behavioral/Psych: No insomnia or anxiety  Cardiovascular no chest pain or shortness of breath      Objective:           Vitals and nursing note reviewed.   Constitutional:       Appearance: Well-developed.   HENT:      Head: Normocephalic.      Right Ear: External ear normal.      Left Ear: External ear normal.   Neck:      Vascular: No JVD.   Pulmonary:      Effort: Pulmonary effort is normal. No respiratory distress.      Breath sounds: Normal breath sounds. No stridor. No rales.   Cardiovascular:      Normal rate. Regular rhythm.      No gallop.    Pulses:     Intact distal pulses.   Edema:     Peripheral edema absent.   Abdominal:      General: Bowel sounds are normal. There is no distension.      Palpations: Abdomen is soft.      Tenderness: There is no abdominal tenderness. There is no guarding.   Musculoskeletal: Normal range of motion.         General: No tenderness.      Cervical back: Normal range of motion. Skin:     General: Skin is warm.   Neurological:      Mental Status: Alert and oriented to person, place, and time.      Deep Tendon Reflexes: Reflexes are normal and symmetric.   Psychiatric:         Judgment: Judgment normal.         Procedures    Interpretation Summary         Findings consistent with an equivocal ECG stress test.    Left ventricular ejection fraction is normal (Calculated EF = 50%).    Myocardial perfusion imaging indicates a small-sized infarct located in the lateral wall with no significant ischemia noted.    Impressions are consistent with an intermediate risk study.    Interpretation Summary         Left ventricular ejection fraction appears to be 51 - 55%.    The left ventricular cavity is mild to moderately dilated.    Left ventricular diastolic function was normal.    The left atrial cavity is moderately dilated.    Left atrial volume is moderately increased.    Aortic valve area is 2.1 cm2.    Peak velocity of the flow  distal to the aortic valve is 286 cm/s. Aortic valve maximum pressure gradient is 33 mmHg. Aortic valve mean pressure gradient is 18 mmHg. Aortic valve dimensionless index is 0.5 .    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 Paravalvular regurgitation is present in the prosthetic aortic valve.    Estimated right ventricular systolic pressure from tricuspid regurgitation is moderately elevated (45-55 mmHg). Calculated right ventricular systolic pressure from tricuspid regurgitation is 51 mmHg.         Current Outpatient Medications:     amLODIPine (NORVASC) 10 MG tablet, Take 1 tablet by mouth Daily., Disp: 90 tablet, Rfl: 1    atorvastatin (LIPITOR) 20 MG tablet, Take 1 tablet by mouth Every Night., Disp: 90 tablet, Rfl: 1    bumetanide (BUMEX) 2 MG tablet, Take 1 tablet by mouth Daily., Disp: 90 tablet, Rfl: 1    Cholecalciferol 25 MCG (1000 UT) tablet, Take 1 tablet by mouth Daily., Disp: , Rfl:     clopidogrel (PLAVIX) 75 MG tablet, Take 1 tablet by mouth Daily., Disp: 90 tablet, Rfl: 1    glipizide (GLUCOTROL) 5 MG tablet, Take 1 tablet by mouth 2 (Two) Times a Day Before Meals., Disp: 180 tablet, Rfl: 1    glucose blood (Accu-Chek Keshia Plus) test strip, Use to test blood sugar twice daily for diabetes, Disp: 100 each, Rfl: 11    hydrALAZINE (APRESOLINE) 100 MG tablet, Take 3 tablets by mouth 2 (Two) Times a Day., Disp: , Rfl:     hydrALAZINE (APRESOLINE) 50 MG tablet, TAKE 1 TABLET EVERY 8 HOURS, Disp: 270 tablet, Rfl: 1    linagliptin (Tradjenta) 5 MG tablet tablet, Take 1 tablet by mouth Daily., Disp: 90 tablet, Rfl: 1    losartan (COZAAR) 25 MG tablet, , Disp: , Rfl:     metoprolol tartrate (LOPRESSOR) 25 MG tablet, Take 1 tablet by mouth 2 (Two) Times a Day., Disp: 180 tablet, Rfl: 1    potassium chloride (KLOR-CON) 20 MEQ CR tablet, Take 1 tablet by mouth Daily., Disp: 90 tablet, Rfl: 1    vitamin C (ASCORBIC ACID) 250 MG tablet, Take 1 tablet by mouth Daily., Disp: , Rfl:     Zinc 50 MG tablet, ,  Disp: , Rfl:      Assessment:        Patient Active Problem List   Diagnosis    Abnormal ECG    Arteriosclerosis of coronary artery    Cardiac murmur    Essential hypertension    LAFB (left anterior fascicular block)    Bundle branch block, right    Neuropathic arthropathy    Type 2 diabetes mellitus with circulatory disorder, without long-term current use of insulin    SHASTA (obstructive sleep apnea)    Gain of weight    Gout    Class 1 obesity due to excess calories without serious comorbidity with body mass index (BMI) of 30.0 to 30.9 in adult    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    Chronic venous insufficiency    Nonrheumatic aortic valve stenosis    Carotid stenosis, asymptomatic    Bradycardia    Hyperlipidemia    Bilateral carotid artery disease    Abnormal cardiovascular stress test    H/O aortic valve replacement with porcine valve    Charcot-Davida-Tooth disease-like deformity of foot    Amputated toe of right foot    Fall    Non-traumatic rhabdomyolysis    S/P CABG x 2    CKD (chronic kidney disease) stage 3, GFR 30-59 ml/min    Rupture of left quadriceps tendon    Constipation    Wound of right leg    Anemia    Chronic diastolic (congestive) heart failure    Body mass index (BMI)40.0-44.9, adult    Amputated toe of left foot    Gas gangrene    Cellulitis of left lower limb    Acquired absence of left foot    Bilateral lower extremity edema    CKD (chronic kidney disease) stage 3, GFR 30-59 ml/min               Plan:   1.  Coronary artery disease s/p CABG times 2/20/2019: Ischemic work-up as above.  No chest pain or shortness of breath.  Continue Plavix, atorvastatin, metoprolol.    Risk reduction for the coronary artery disease, controlling the blood pressure, blood sugar management, cholesterol management, exercise, stress management, and proper compliance with medications and follow-up has been discussed    2.  Hypertension: BP elevated today in office, however he reports it is normally well  controlled.  Reviewed blood pressure from recent office visit at nephrology 140s over 50s.  Continue amlodipine, hydralazine, losartan, Lopressor.    Importance of controlling hypertension and blood pressure  checkup on the regular basis has been explained. Hypertension as a silent killer has been discussed. Risk reduction of the weight and regular exercises to control the hypertension has been explained.    3.  Hyperlipidemia: He reports his PCP manages his cholesterol labs.  Continue current management.    4.  S/p aortic valve replacement 2019: Echo as above.  Noted paravalvular regurgitation similar to previous.  He has no chest pain or shortness of breath, no lightheadedness or dizziness.  Stable.  Follow-up with echo in 1 year.                 No diagnosis found.    There are no diagnoses linked to this encounter.    COUNSELING:    Rock Maciel Jr.Counseling was given to patient for the following topics: diagnostic results, risk factor reductions, impressions, risks and benefits of treatment options and importance of treatment compliance .       SMOKING COUNSELING:    Follow up in 1 year or sooner if needed with echo    Sincerely,   ZEINA Loredo  [facility]  11/16/23  15:16 EST    EMR Dragon/Transcription disclaimer:   Much of this encounter note is an electronic transcription/translation of spoken language to printed text. The electronic translation of spoken language may permit erroneous, or at times, nonsensical words or phrases to be inadvertently transcribed; Although I have reviewed the note for such errors, some may still exist.

## 2023-12-01 ENCOUNTER — OFFICE VISIT (OUTPATIENT)
Dept: FAMILY MEDICINE CLINIC | Facility: CLINIC | Age: 78
End: 2023-12-01
Payer: MEDICARE

## 2023-12-01 VITALS
BODY MASS INDEX: 41.22 KG/M2 | WEIGHT: 311 LBS | SYSTOLIC BLOOD PRESSURE: 142 MMHG | HEIGHT: 73 IN | OXYGEN SATURATION: 94 % | DIASTOLIC BLOOD PRESSURE: 64 MMHG | HEART RATE: 75 BPM

## 2023-12-01 DIAGNOSIS — E78.5 HYPERLIPIDEMIA, UNSPECIFIED HYPERLIPIDEMIA TYPE: ICD-10-CM

## 2023-12-01 DIAGNOSIS — R01.1 CARDIAC MURMUR: ICD-10-CM

## 2023-12-01 DIAGNOSIS — N18.32 STAGE 3B CHRONIC KIDNEY DISEASE: ICD-10-CM

## 2023-12-01 DIAGNOSIS — R60.0 BILATERAL LOWER EXTREMITY EDEMA: ICD-10-CM

## 2023-12-01 DIAGNOSIS — E11.59 TYPE 2 DIABETES MELLITUS WITH OTHER CIRCULATORY COMPLICATION, WITHOUT LONG-TERM CURRENT USE OF INSULIN: ICD-10-CM

## 2023-12-01 DIAGNOSIS — I10 ESSENTIAL HYPERTENSION: Primary | ICD-10-CM

## 2023-12-01 NOTE — PROGRESS NOTES
"Chief Complaint  Hypertension (Follow up )    Subjective          Rock Maciel Jr. presents to [facility] for  History of Present Illness  He is here to follow-up on Hypertension, Diabetes Type 2, Hyperlipidemia and BLE edema:     HYPERTENSION - he currently takes Amlodipine 10 mg daily, Hydralazine 100mg TID, Metoprolol Tartrate 25 mg BID and Losartan 25mg daily.  He denies SOB, chest pain, heart palpitations, lightheadedness and dizziness.     HYPERLIPIDEMIA - he is currently taking Atorvastatin 20 mg nightly.  He denies myalgia.     DMT2 - he is currently taking Glipizide 5mg BID, Tradjenta 5mg daily.  He checks his blood sugar fasting first thing in the morning.  His blood glucose level ranges between .  He has been working on decreasing sugar/carbs and increasing activity.     CKD Stage 3b - he is currently taking Bumex 2 mg daily and KlorCon 20mg daily.  He is followed by Nephrologist, Dr. Les Talavera, every 3 months, goal BP below 130/80.     BLE Edema - bilateral lower extremity edema has improved.  He is wearing compression socks.     Review of Systems   Respiratory:  Negative for shortness of breath and wheezing.    Cardiovascular:  Positive for leg swelling. Negative for chest pain and palpitations.   Endocrine: Negative for polydipsia, polyphagia and polyuria.   Musculoskeletal:  Negative for myalgias.   Neurological:  Negative for dizziness and light-headedness.         Objective   Vital Signs:   /64 (BP Location: Right arm, Patient Position: Sitting, Cuff Size: Adult)   Pulse 75   Ht 185.4 cm (72.99\")   Wt (!) 141 kg (311 lb)   SpO2 94%   BMI 41.04 kg/m²           Physical Exam  Vitals and nursing note reviewed.   Constitutional:       Appearance: Normal appearance.   HENT:      Head: Normocephalic and atraumatic.   Eyes:      Conjunctiva/sclera: Conjunctivae normal.   Cardiovascular:      Rate and Rhythm: Normal rate and regular rhythm.      Pulses:   "         Posterior tibial pulses are 1+ on the right side and 1+ on the left side.      Heart sounds: Normal heart sounds. Murmur heard.       with a grade of 2/6.   Pulmonary:      Effort: Pulmonary effort is normal. No respiratory distress.      Breath sounds: Normal breath sounds. No wheezing, rhonchi or rales.   Musculoskeletal:      Right lower leg: Edema present.      Left lower leg: Edema present.      Comments: BLE 1+ pitting edema, wearing compression hose   Skin:     General: Skin is warm.   Neurological:      Mental Status: He is alert.   Psychiatric:         Mood and Affect: Mood normal.        Result Review :                 Assessment and Plan    Diagnoses and all orders for this visit:    1. Essential hypertension (Primary)  Assessment & Plan:  Hypertension is slightly elevated.  Decrease table salt and foods high in salt.  Weight loss.  Increase activity daily.  Continue Amlodipine 10 mg daily, Hydralazine 100mg TID, Metoprolol Tartrate 25 mg BID and Losartan 25mg daily.  Blood pressure will be reassessed in 3 months.        2. Hyperlipidemia, unspecified hyperlipidemia type  Assessment & Plan:  Lipid abnormalities are improving on Atorvastatin 20 mg nightly.  Encouraged lifestyle changes.  Continue current treatment plan.  Will re-assess lipids in 3 months.        3. Type 2 diabetes mellitus with other circulatory complication, without long-term current use of insulin  Assessment & Plan:  Diabetes is improving with treatment.  A1c 7.1 on prior lab from 8/2023.  Continue current treatment regimen.  Reminded to bring in blood sugar diary at next visit.  Dietary recommendations for ADA diet.  Regular aerobic exercise.  Discussed ways to avoid symptomatic hypoglycemia.  Discussed foot care.  Reminded to get yearly retinal exam.  Diabetes will be reassessed in 3 months.      4. Stage 3b chronic kidney disease  Comments:  Followed by Nephrologist, Dr. Les Talavera,    5. Bilateral lower extremity  edema  Comments:  Stable.  Decrease salt.  Increase activity.  Elevate BLE.  Wear compression socks.  Will continue to monitor.    6. Cardiac murmur  Comments:  Asymptomatic.  Followed by Dr. Toney.        Follow Up   Return in about 3 months (around 3/1/2024) for Next scheduled follow up - HTN, HLD, DMT2.  Patient was given instructions and counseling regarding his condition or for health maintenance advice. Please see specific information pulled into the AVS if appropriate.

## 2023-12-05 NOTE — ASSESSMENT & PLAN NOTE
Lipid abnormalities are improving on Atorvastatin 20 mg nightly.  Encouraged lifestyle changes.  Continue current treatment plan.  Will re-assess lipids in 3 months.

## 2023-12-05 NOTE — ASSESSMENT & PLAN NOTE
Diabetes is improving with treatment.  A1c 7.1 on prior lab from 8/2023.  Continue current treatment regimen.  Reminded to bring in blood sugar diary at next visit.  Dietary recommendations for ADA diet.  Regular aerobic exercise.  Discussed ways to avoid symptomatic hypoglycemia.  Discussed foot care.  Reminded to get yearly retinal exam.  Diabetes will be reassessed in 3 months.

## 2023-12-11 ENCOUNTER — TELEPHONE (OUTPATIENT)
Dept: FAMILY MEDICINE CLINIC | Facility: CLINIC | Age: 78
End: 2023-12-11

## 2023-12-11 NOTE — TELEPHONE ENCOUNTER
ATTENTION [facility] STATES THAT JESUS IS NOT AVAILABLE FOR THEM TO ASSIGN AS PCP, SHE MAY NEED TO UPDATE HER CREDENTIALS.

## 2024-02-20 ENCOUNTER — OFFICE VISIT (OUTPATIENT)
Dept: FAMILY MEDICINE CLINIC | Facility: CLINIC | Age: 79
End: 2024-02-20
Payer: MEDICARE

## 2024-02-20 DIAGNOSIS — I10 ESSENTIAL HYPERTENSION: Primary | ICD-10-CM

## 2024-02-20 DIAGNOSIS — R01.1 CARDIAC MURMUR: ICD-10-CM

## 2024-02-20 DIAGNOSIS — Z87.01 HISTORY OF PNEUMONIA: ICD-10-CM

## 2024-02-20 DIAGNOSIS — I87.2 CHRONIC VENOUS INSUFFICIENCY: ICD-10-CM

## 2024-02-20 PROCEDURE — 3077F SYST BP >= 140 MM HG: CPT | Performed by: NURSE PRACTITIONER

## 2024-02-20 PROCEDURE — 1159F MED LIST DOCD IN RCRD: CPT | Performed by: NURSE PRACTITIONER

## 2024-02-20 PROCEDURE — 99213 OFFICE O/P EST LOW 20 MIN: CPT | Performed by: NURSE PRACTITIONER

## 2024-02-20 PROCEDURE — 3078F DIAST BP <80 MM HG: CPT | Performed by: NURSE PRACTITIONER

## 2024-02-20 PROCEDURE — 1160F RVW MEDS BY RX/DR IN RCRD: CPT | Performed by: NURSE PRACTITIONER

## 2024-02-20 RX ORDER — DOXYCYCLINE HYCLATE 100 MG/1
1 CAPSULE ORAL EVERY 12 HOURS SCHEDULED
COMMUNITY
Start: 2024-02-11 | End: 2024-02-20

## 2024-02-20 NOTE — PROGRESS NOTES
"Subjective          Rock Maciel Jr. presents to [facility] for Follow-up (Pneumonia )   History of Present Illness    He is here to follow-up on recent Urgent Care visit on 12/19/23, diagnosed with PNE and was given Nebulizer treatment, Augmentin, Z-pack and Prednisone and it help relieve symptoms.  He then started feeling bad again in early February, 2024, had X-ray of chest and told again that he had PNE and was given Albuterol HFA PRN and Doxycyline BID and reports he feels much better but does not feel 100%.  Today he reports having occasional dry cough.  He also notices that when he walks long distance he will occasionally feel SOB.   He also reports when he was sick he ran out of Bumex for 1 week.  He reports just started back taking Bumex on 2/16/24.  He took all his BP medication today.      Review of Systems       Objective   Vital Signs:   /64 (BP Location: Left arm, Patient Position: Sitting, Cuff Size: Adult) Comment: re-checked  Pulse 82   Temp 98.3 °F (36.8 °C) (Oral)   Ht 185.4 cm (72.99\")   Wt 135 kg (298 lb)   SpO2 97%   BMI 39.32 kg/m²           Physical Exam  Vitals and nursing note reviewed.   Constitutional:       Comments: Chronically ill appearing   HENT:      Head: Normocephalic and atraumatic.   Cardiovascular:      Rate and Rhythm: Normal rate and regular rhythm.      Heart sounds: Murmur heard.   Pulmonary:      Effort: Pulmonary effort is normal. No respiratory distress.      Breath sounds: Normal breath sounds. No wheezing or rales.   Musculoskeletal:      Right lower leg: Edema present.      Left lower leg: Edema present.      Comments: BLE 1+ pitting edema   Skin:     General: Skin is warm and dry.   Neurological:      Mental Status: He is alert.   Psychiatric:         Mood and Affect: Mood normal.        Result Review :                 Assessment and Plan    Diagnoses and all orders for this visit:    1. Essential hypertension " (Primary)  Assessment & Plan:  Hypertension is uncontrolled.  Continue current treatment plan.  Decrease table salt and foods high in salt.  Weight loss.  Increase activity daily.  Blood pressure will be re-assessed in 2 weeks.        2. Chronic venous insufficiency  Assessment & Plan:  BLE 1+ pitting edema.  Encouraged to not skip taking Bumex.  Decrease table salt and foods high in salt.  Increase activity daily.  Elevate BLE when sitting for extended timeframe.  Will continue to monitor.        3. Cardiac murmur  Assessment & Plan:  Followed by Cardiology.      4. History of pneumonia  Comments:  Stable; he feels much better but does not feel 100%; has lingering dry cough.  Will continue to monitor.        Follow Up   Return in about 2 weeks (around 3/5/2024) for Recheck - HTN & PNE.  Patient was given instructions and counseling regarding his condition or for health maintenance advice. Please see specific information pulled into the AVS if appropriate.

## 2024-02-26 VITALS
DIASTOLIC BLOOD PRESSURE: 64 MMHG | HEART RATE: 82 BPM | WEIGHT: 298 LBS | TEMPERATURE: 98.3 F | HEIGHT: 73 IN | SYSTOLIC BLOOD PRESSURE: 174 MMHG | BODY MASS INDEX: 39.49 KG/M2 | OXYGEN SATURATION: 97 %

## 2024-02-27 NOTE — ASSESSMENT & PLAN NOTE
BLE 1+ pitting edema.  Encouraged to not skip taking Bumex.  Decrease table salt and foods high in salt.  Increase activity daily.  Elevate BLE when sitting for extended timeframe.  Will continue to monitor.

## 2024-02-27 NOTE — ASSESSMENT & PLAN NOTE
Hypertension is uncontrolled.  Continue current treatment plan.  Decrease table salt and foods high in salt.  Weight loss.  Increase activity daily.  Blood pressure will be re-assessed in 2 weeks.

## 2024-03-05 ENCOUNTER — OFFICE VISIT (OUTPATIENT)
Dept: FAMILY MEDICINE CLINIC | Facility: CLINIC | Age: 79
End: 2024-03-05
Payer: MEDICARE

## 2024-03-05 VITALS
HEIGHT: 73 IN | OXYGEN SATURATION: 93 % | WEIGHT: 299 LBS | SYSTOLIC BLOOD PRESSURE: 170 MMHG | HEART RATE: 63 BPM | BODY MASS INDEX: 39.63 KG/M2 | DIASTOLIC BLOOD PRESSURE: 70 MMHG

## 2024-03-05 DIAGNOSIS — Z87.01 HISTORY OF PNEUMONIA: ICD-10-CM

## 2024-03-05 DIAGNOSIS — E11.59 TYPE 2 DIABETES MELLITUS WITH OTHER CIRCULATORY COMPLICATION, WITHOUT LONG-TERM CURRENT USE OF INSULIN: Chronic | ICD-10-CM

## 2024-03-05 DIAGNOSIS — N18.32 STAGE 3B CHRONIC KIDNEY DISEASE: ICD-10-CM

## 2024-03-05 DIAGNOSIS — E78.2 MIXED HYPERLIPIDEMIA: Chronic | ICD-10-CM

## 2024-03-05 DIAGNOSIS — I10 ESSENTIAL HYPERTENSION: Primary | Chronic | ICD-10-CM

## 2024-03-05 DIAGNOSIS — R60.0 BILATERAL LOWER EXTREMITY EDEMA: ICD-10-CM

## 2024-03-05 RX ORDER — LOSARTAN POTASSIUM 50 MG/1
50 TABLET ORAL DAILY
Qty: 30 TABLET | Refills: 0 | Status: SHIPPED | OUTPATIENT
Start: 2024-03-05

## 2024-03-05 NOTE — PROGRESS NOTES
"Subjective          Rock Maciel Jr. presents to [facility] for Hypertension (F/U---no other issues)   History of Present Illness    He is here to follow-up on Hypertension, Diabetes Type 2, Hyperlipidemia, BLE edema and Pneumonia:     HYPERTENSION - he currently takes Amlodipine 10 mg daily, Hydralazine 100mg TID, Metoprolol Tartrate 25 mg BID and Losartan 25mg daily.  He denies SOB, chest pain, heart palpitations, lightheadedness and dizziness.  Goal BP below 130/80.     HYPERLIPIDEMIA - he is currently taking Atorvastatin 20 mg nightly.  He denies myalgia.     DMT2 - he is currently taking Glipizide 5mg BID, Tradjenta 5mg daily.  He checks his blood sugar fasting first thing in the morning.  His blood glucose level ranges between 103-155.  A1c 7.1 on labs from 8/23/23.  He has been working on decreasing sugar/carbs and increasing activity.     CKD Stage 3b - he is currently taking Bumex 2 mg daily and KlorCon 20mg daily as directed.  He is followed by Nephrologist, Dr. Les Talavera, next appointment is on 3/25/24.     BLE Edema - bilateral lower extremity edema has improved.  He is wearing compression socks.     Pneumonia - he reports feeling 100% better and cough is gone.    Review of Systems       Objective   Vital Signs:   /70 (BP Location: Left arm, Patient Position: Sitting, Cuff Size: Adult) Comment: re-checked  Pulse 63   Ht 185.4 cm (72.99\")   Wt 136 kg (299 lb)   SpO2 93%   BMI 39.46 kg/m²           Physical Exam  Vitals and nursing note reviewed.   Constitutional:       General: He is not in acute distress.     Appearance: Normal appearance.   HENT:      Head: Normocephalic and atraumatic.   Cardiovascular:      Rate and Rhythm: Normal rate and regular rhythm.      Heart sounds: Normal heart sounds. No murmur heard.  Pulmonary:      Effort: Pulmonary effort is normal. No respiratory distress.      Breath sounds: Normal breath sounds. No wheezing. "   Musculoskeletal:      Right lower leg: Edema present.      Left lower leg: Edema present.      Comments: BLE trace non-pitting edema   Skin:     General: Skin is warm and dry.      Findings: No erythema.   Neurological:      Mental Status: He is alert.        Result Review :                 Assessment and Plan    Diagnoses and all orders for this visit:    1. Essential hypertension (Primary)  Assessment & Plan:  Hypertension is uncontrolled on Amlodipine 10 mg daily, Hydralazine 100mg TID, Metoprolol Tartrate 25 mg BID and Losartan 25mg daily.  Continue current treatment plan.  Decrease table salt and foods high in salt.  Weight loss.  Increase activity daily.  Continue Amlodipine 10 mg daily, Hydralazine 100mg TID, Metoprolol Tartrate 25 mg BID and Bumex 2mg daily.  Increase Losartan from 25mg to 50mg daily.  Blood pressure will be re-assessed in 1 week.      Orders:  -     losartan (Cozaar) 50 MG tablet; Take 1 tablet by mouth Daily.  Dispense: 30 tablet; Refill: 0    2. Mixed hyperlipidemia  Assessment & Plan:  Improving on Atorvastatin 20mg nightly.  Encouraged lifestyle changes.  Continue current treatment plan.  Will re-assess lipids today.      Orders:  -     Lipid Panel    3. Type 2 diabetes mellitus with other circulatory complication, without long-term current use of insulin  Assessment & Plan:  Diabetes is improving with treatment.  A1c 6.5 today.  Continue current treatment regimen.  Reminded to bring in blood sugar diary at next visit.  Recommended an ADA diet.  Regular aerobic exercise.  Discussed ways to avoid symptomatic hypoglycemia.  Discussed foot care.  Reminded to get yearly retinal exam.  Continue Glipizide 5mg BID, Tradjenta 5mg daily.  Diabetes will be re-assessed in 6 months    Orders:  -     Hemoglobin A1c    4. Stage 3b chronic kidney disease  Assessment & Plan:  Stable.  Followed by Nephrologist, Dr. Les Talavera; patient reports next appointment is on 3/25/24.      5. Bilateral lower  extremity edema  Assessment & Plan:  BLE trace non-pitting edema.  Decrease table salt and foods high in salt.  Increase activity daily.  Continue wearing compression socks.  Elevate BLE when sitting for extended timeframe.  Will continue to monitor.        6. History of pneumonia  Assessment & Plan:  Resolved. Patient reports feeling 100% better and cough is gone.  Order: Chest X-ray f/u Pneumonia  Advised patient that he must walk in to [facility] to have Chest X-ray done, no appointment needed, he understood and agreed.    Orders:  -     XR Chest PA & Lateral; Future        Follow Up   Return in about 1 week (around 3/12/2024) for Follow-up Blood Pressure - medication change.  Patient was given instructions and counseling regarding his condition or for health maintenance advice. Please see specific information pulled into the AVS if appropriate.

## 2024-03-06 LAB
CHOLEST SERPL-MCNC: 141 MG/DL (ref 0–200)
HBA1C MFR BLD: 6.5 % (ref 4.8–5.6)
HDLC SERPL-MCNC: 50 MG/DL (ref 40–60)
LDLC SERPL CALC-MCNC: 66 MG/DL (ref 0–100)
TRIGL SERPL-MCNC: 145 MG/DL (ref 0–150)
VLDLC SERPL CALC-MCNC: 25 MG/DL (ref 5–40)

## 2024-03-10 PROBLEM — Z87.01 HISTORY OF PNEUMONIA: Status: ACTIVE | Noted: 2024-03-10

## 2024-03-10 NOTE — ASSESSMENT & PLAN NOTE
Diabetes is improving with treatment.  A1c 6.5 today.  Continue current treatment regimen.  Reminded to bring in blood sugar diary at next visit.  Recommended an ADA diet.  Regular aerobic exercise.  Discussed ways to avoid symptomatic hypoglycemia.  Discussed foot care.  Reminded to get yearly retinal exam.  Continue Glipizide 5mg BID, Tradjenta 5mg daily.  Diabetes will be re-assessed in 6 months

## 2024-03-10 NOTE — ASSESSMENT & PLAN NOTE
Hypertension is uncontrolled on Amlodipine 10 mg daily, Hydralazine 100mg TID, Metoprolol Tartrate 25 mg BID and Losartan 25mg daily.  Continue current treatment plan.  Decrease table salt and foods high in salt.  Weight loss.  Increase activity daily.  Continue Amlodipine 10 mg daily, Hydralazine 100mg TID, Metoprolol Tartrate 25 mg BID and Bumex 2mg daily.  Increase Losartan from 25mg to 50mg daily.  Blood pressure will be re-assessed in 1 week.

## 2024-03-10 NOTE — ASSESSMENT & PLAN NOTE
Improving on Atorvastatin 20mg nightly.  Encouraged lifestyle changes.  Continue current treatment plan.  Will re-assess lipids today.

## 2024-03-10 NOTE — ASSESSMENT & PLAN NOTE
Resolved. Patient reports feeling 100% better and cough is gone.  Order: Chest X-ray f/u Pneumonia  Advised patient that he must walk in to imaging department at [facility] to have Chest X-ray done, no appointment needed, he understood and agreed.

## 2024-03-10 NOTE — ASSESSMENT & PLAN NOTE
Stable.  Followed by Nephrologist, Dr. Les Talavera; patient reports next appointment is on 3/25/24.

## 2024-03-10 NOTE — ASSESSMENT & PLAN NOTE
BLE trace non-pitting edema.  Decrease table salt and foods high in salt.  Increase activity daily.  Continue wearing compression socks.  Elevate BLE when sitting for extended timeframe.  Will continue to monitor.

## 2024-03-12 ENCOUNTER — OFFICE VISIT (OUTPATIENT)
Dept: FAMILY MEDICINE CLINIC | Facility: CLINIC | Age: 79
End: 2024-03-12
Payer: MEDICARE

## 2024-03-12 VITALS
HEIGHT: 73 IN | OXYGEN SATURATION: 92 % | SYSTOLIC BLOOD PRESSURE: 160 MMHG | WEIGHT: 298 LBS | HEART RATE: 54 BPM | BODY MASS INDEX: 39.49 KG/M2 | DIASTOLIC BLOOD PRESSURE: 54 MMHG

## 2024-03-12 DIAGNOSIS — I10 ESSENTIAL HYPERTENSION: Primary | ICD-10-CM

## 2024-03-12 DIAGNOSIS — R01.1 CARDIAC MURMUR: ICD-10-CM

## 2024-03-12 DIAGNOSIS — R60.0 BILATERAL LOWER EXTREMITY EDEMA: ICD-10-CM

## 2024-03-12 DIAGNOSIS — R00.1 BRADYCARDIA: ICD-10-CM

## 2024-03-12 PROCEDURE — 3077F SYST BP >= 140 MM HG: CPT | Performed by: NURSE PRACTITIONER

## 2024-03-12 PROCEDURE — 99214 OFFICE O/P EST MOD 30 MIN: CPT | Performed by: NURSE PRACTITIONER

## 2024-03-12 PROCEDURE — 3078F DIAST BP <80 MM HG: CPT | Performed by: NURSE PRACTITIONER

## 2024-03-12 NOTE — PROGRESS NOTES
"Subjective          Rock Maciel Jr. presents to [facility] for Hypertension (Follow up )   History of Present Illness    He is here to follow-up on uncontrolled Hypertension:     Uncontrolled Hypertension - he currently takes Amlodipine 10 mg daily, Hydralazine 100mg TID, Metoprolol Tartrate 25 mg BID and Losartan 50mg daily.  He denies SOB, chest pain, heart palpitations, lightheadedness and dizziness.  Discussed goal BP to be below 130/80.  We just increased Losartan from 25mg to 50mg daily one week ago.      Bradycardia - HR decreased today.  He is taking Metoprolol Tartrate 25mg BID.  He denies CP, SOB, heart palpitations and syncope.  Will continue to monitor and possibly decrease Metoprolol Tartrate if heart rate continues to drop.    Review of Systems       Objective   Vital Signs:   /54 (BP Location: Left arm, Patient Position: Sitting, Cuff Size: Adult) Comment: re-checked  Pulse 54   Ht 185.4 cm (72.99\")   Wt 135 kg (298 lb)   SpO2 92%   BMI 39.32 kg/m²           Physical Exam  Vitals and nursing note reviewed.   Constitutional:       General: He is not in acute distress.     Appearance: Normal appearance.   HENT:      Head: Normocephalic and atraumatic.   Cardiovascular:      Rate and Rhythm: Normal rate and regular rhythm.      Heart sounds: Murmur heard.      Systolic murmur is present with a grade of 3/6.   Pulmonary:      Effort: Pulmonary effort is normal. No respiratory distress.      Breath sounds: Normal breath sounds. No wheezing.   Musculoskeletal:      Right lower le+ Pitting Edema present.      Left lower le+ Pitting Edema present.   Skin:     General: Skin is warm and dry.      Findings: No erythema.   Neurological:      Mental Status: He is alert.        Result Review :                 Assessment and Plan    Diagnoses and all orders for this visit:    1. Essential hypertension (Primary)  Assessment & Plan:  Hypertension is uncontrolled " but improved slightly from 1 week ago after increasing Losartan from 25mg to 50mg daily.  Decrease table salt and foods high in salt.  Weight loss.  Increase activity daily.  Continue Amlodipine 10 mg daily, Hydralazine 100mg TID, Metoprolol Tartrate 25 mg BID and Losartan 50mg daily.  Will give patient a couple weeks on increased dose of Losartan before making any other adjustments.  Patient has appointment with Nephrology the end of March, 2024.  Recommended patient discuss uncontrolled blood pressure with Nephrology and he agreed.  Blood pressure will be re-assessed in 1 months.        2. Bradycardia  Assessment & Plan:  Asymptomatic; HR 54, O2Sat 92%.  Currently taking Metoprolol Tartrate 25mg BID.  Will continue to monitor.  Followed by Cardiology, Dr. Toney.      3. Cardiac murmur  Assessment & Plan:  Asymptomatic; Heart Murmur 3/6.  Followed by Cardiology, Dr. Toney.      4. Bilateral lower extremity edema  Assessment & Plan:  BLE 1+ pitting edema.  Decrease table salt and foods high in salt.  Increase activity daily.  Elevate BLE when sitting for extended timeframe.  Continue wearing compression socks.  Will continue to monitor.            Follow Up   Return in about 1 month (around 4/12/2024) for Next scheduled follow up - uncontrolled HTN.  Patient was given instructions and counseling regarding his condition or for health maintenance advice. Please see specific information pulled into the AVS if appropriate.

## 2024-03-15 NOTE — ASSESSMENT & PLAN NOTE
Hypertension is uncontrolled but improved slightly from 1 week ago after increasing Losartan from 25mg to 50mg daily.  Decrease table salt and foods high in salt.  Weight loss.  Increase activity daily.  Continue Amlodipine 10 mg daily, Hydralazine 100mg TID, Metoprolol Tartrate 25 mg BID and Losartan 50mg daily.  Will give patient a couple weeks on increased dose of Losartan before making any other adjustments.  Patient has appointment with Nephrology the end of March, 2024.  Recommended patient discuss uncontrolled blood pressure with Nephrology and he agreed.  Blood pressure will be re-assessed in 1 months.

## 2024-03-15 NOTE — ASSESSMENT & PLAN NOTE
Asymptomatic; HR 54, O2Sat 92%.  Currently taking Metoprolol Tartrate 25mg BID.  Will continue to monitor.  Followed by Cardiology, Dr. Toney.

## 2024-03-15 NOTE — ASSESSMENT & PLAN NOTE
BLE 1+ pitting edema.  Decrease table salt and foods high in salt.  Increase activity daily.  Elevate BLE when sitting for extended timeframe.  Continue wearing compression socks.  Will continue to monitor.

## 2024-04-02 DIAGNOSIS — I10 ESSENTIAL HYPERTENSION: Chronic | ICD-10-CM

## 2024-04-03 RX ORDER — LOSARTAN POTASSIUM 50 MG/1
50 TABLET ORAL DAILY
Qty: 30 TABLET | Refills: 0 | Status: SHIPPED | OUTPATIENT
Start: 2024-04-03

## 2024-04-05 ENCOUNTER — HOSPITAL ENCOUNTER (OUTPATIENT)
Dept: GENERAL RADIOLOGY | Facility: HOSPITAL | Age: 79
Discharge: HOME | End: 2024-04-05
Payer: MEDICARE

## 2024-04-05 DIAGNOSIS — Z87.01 HISTORY OF PNEUMONIA: ICD-10-CM

## 2024-04-05 PROCEDURE — 71046 X-RAY EXAM CHEST 2 VIEWS: CPT

## 2024-04-16 ENCOUNTER — OFFICE VISIT (OUTPATIENT)
Dept: FAMILY MEDICINE CLINIC | Facility: CLINIC | Age: 79
End: 2024-04-16
Payer: MEDICARE

## 2024-04-16 VITALS
HEART RATE: 64 BPM | DIASTOLIC BLOOD PRESSURE: 60 MMHG | BODY MASS INDEX: 39.1 KG/M2 | HEIGHT: 73 IN | WEIGHT: 295 LBS | SYSTOLIC BLOOD PRESSURE: 150 MMHG | OXYGEN SATURATION: 93 %

## 2024-04-16 DIAGNOSIS — Z87.01 HISTORY OF PNEUMONIA: ICD-10-CM

## 2024-04-16 DIAGNOSIS — E78.5 HYPERLIPIDEMIA, UNSPECIFIED HYPERLIPIDEMIA TYPE: Chronic | ICD-10-CM

## 2024-04-16 DIAGNOSIS — I10 ESSENTIAL HYPERTENSION: Primary | Chronic | ICD-10-CM

## 2024-04-16 DIAGNOSIS — N18.32 STAGE 3B CHRONIC KIDNEY DISEASE: Chronic | ICD-10-CM

## 2024-04-16 DIAGNOSIS — R91.8 PULMONARY INFILTRATES ON CXR: ICD-10-CM

## 2024-04-16 DIAGNOSIS — R60.0 BILATERAL LOWER EXTREMITY EDEMA: ICD-10-CM

## 2024-04-16 DIAGNOSIS — R09.89 CHEST CONGESTION: ICD-10-CM

## 2024-04-16 DIAGNOSIS — I50.32 CHRONIC DIASTOLIC (CONGESTIVE) HEART FAILURE: Chronic | ICD-10-CM

## 2024-04-16 DIAGNOSIS — R09.89 BILATERAL RALES: ICD-10-CM

## 2024-04-16 RX ORDER — LOSARTAN POTASSIUM 50 MG/1
75 TABLET ORAL DAILY
Start: 2024-04-16

## 2024-04-16 NOTE — ASSESSMENT & PLAN NOTE
Hypertension is uncontrolled.  Decrease table salt and foods high in salt.  Weight loss.  Increase activity daily.  Continue Amlodipine 10mg daily, Bumex 2mg daily, Hydralyzine 100mg TID and Metoprolol 25mg BID.  Increase Losartan from 50mg daily to 75mg daily (1.5 tablet daily).  Blood pressure will be re-assessed in 1 month.

## 2024-04-16 NOTE — PROGRESS NOTES
"Chief Complaint  Hypertension (Follow up )    Subjective        HPI   Rock presents to [facility] for follow-up Hypertension:    Hypertension - he is taking Amlodipine 10mg daily, Bumex 2mg daily, Hydralyzine 100mg TID, Losartan 50mg and Metoprolol 25mg BID.  He denies CP, heart palpitations, SOB, and syncope.    Congestion - he feels congested only when he lays down.  He denies SOB.  He does not recall feeling congested when laying down prior to having Pneumonia.    Left lower Back Pain - he is being followed by Chiropractor, Jeremi Fagan.          Objective   Vital Signs:   Vitals:    04/16/24 1319 04/16/24 1406   BP: 142/68 150/60  Comment: re-checked   BP Location: Left arm Left arm   Patient Position: Sitting Sitting   Cuff Size: Adult Adult   Pulse: 64    SpO2: 93%    Weight: 134 kg (295 lb)    Height: 185.4 cm (72.99\")             2/20/2024     2:57 PM   PHQ-2/PHQ-9 Depression Screening   Little Interest or Pleasure in Doing Things 0-->not at all   Feeling Down, Depressed or Hopeless 0-->not at all   PHQ-9: Brief Depression Severity Measure Score 0               Physical Exam  Vitals and nursing note reviewed.   Constitutional:       General: He is not in acute distress.     Appearance: Normal appearance.   HENT:      Head: Normocephalic and atraumatic.   Cardiovascular:      Rate and Rhythm: Normal rate and regular rhythm.      Heart sounds: Normal heart sounds. No murmur heard.  Pulmonary:      Effort: Pulmonary effort is normal. No respiratory distress.      Breath sounds: Rales present. No wheezing.      Comments: Bilateral lower lobe rales  Musculoskeletal:      Right lower leg: Edema present.      Left lower leg: Edema present.      Comments: BLE tace edema   Skin:     General: Skin is warm and dry.      Findings: No erythema.   Neurological:      Mental Status: He is alert.          Result Review :                Assessment and Plan    Assessment & Plan  Essential " hypertension  Hypertension is uncontrolled.  Decrease table salt and foods high in salt.  Weight loss.  Increase activity daily.  Continue Amlodipine 10mg daily, Bumex 2mg daily, Hydralyzine 100mg TID and Metoprolol 25mg BID.  Increase Losartan from 50mg daily to 75mg daily (1.5 tablet daily).  Blood pressure will be re-assessed in 1 month.    Hyperlipidemia, unspecified hyperlipidemia type       Chronic diastolic (congestive) heart failure      Stage 3b chronic kidney disease      Chest congestion    Bilateral rales  BLL rales on auscultation.    Pulmonary infiltrates on CXR  Order:  CT Chest w/o Contrast     History of pneumonia    Bilateral lower extremity edema  BLE trace pitting edema.  Decrease table salt and foods high in salt.  Increase activity daily.  Elevate BLE when sitting for extended timeframe.  Will continue to monitor.      Orders Placed This Encounter   Procedures    CT Chest Without Contrast    Comprehensive Metabolic Panel    Lipid Panel    BNP ([facility] Only)    CBC & Differential     New Medications Ordered This Visit   Medications    losartan (COZAAR) 50 MG tablet     Sig: Take 1.5 tablets by mouth Daily.          Follow Up   Return in about 1 month (around 5/16/2024) for Follow-up Blood Pressure - medication change.  Patient was given instructions and counseling regarding his condition or for health maintenance advice. Please see specific information pulled into the AVS if appropriate.

## 2024-04-17 LAB
ALBUMIN SERPL-MCNC: 4.3 G/DL (ref 3.8–4.8)
ALBUMIN/GLOB SERPL: 1.4 {RATIO} (ref 1.2–2.2)
ALP SERPL-CCNC: 107 IU/L (ref 44–121)
ALT SERPL-CCNC: 12 IU/L (ref 0–44)
AST SERPL-CCNC: 15 IU/L (ref 0–40)
BASOPHILS # BLD AUTO: 0 X10E3/UL (ref 0–0.2)
BASOPHILS NFR BLD AUTO: 1 %
BILIRUB SERPL-MCNC: 0.3 MG/DL (ref 0–1.2)
BNP SERPL-MCNC: 235.4 PG/ML (ref 0–100)
BUN SERPL-MCNC: 58 MG/DL (ref 8–27)
BUN/CREAT SERPL: 29 (ref 10–24)
CALCIUM SERPL-MCNC: 9.4 MG/DL (ref 8.6–10.2)
CHLORIDE SERPL-SCNC: 106 MMOL/L (ref 96–106)
CHOLEST SERPL-MCNC: 130 MG/DL (ref 100–199)
CO2 SERPL-SCNC: 23 MMOL/L (ref 20–29)
CREAT SERPL-MCNC: 1.99 MG/DL (ref 0.76–1.27)
EGFRCR SERPLBLD CKD-EPI 2021: 34 ML/MIN/1.73
EOSINOPHIL # BLD AUTO: 0.2 X10E3/UL (ref 0–0.4)
EOSINOPHIL NFR BLD AUTO: 4 %
ERYTHROCYTE [DISTWIDTH] IN BLOOD BY AUTOMATED COUNT: 13.6 % (ref 11.6–15.4)
GLOBULIN SER CALC-MCNC: 3.1 G/DL (ref 1.5–4.5)
GLUCOSE SERPL-MCNC: 175 MG/DL (ref 70–99)
HCT VFR BLD AUTO: 36.9 % (ref 37.5–51)
HDLC SERPL-MCNC: 42 MG/DL
HGB BLD-MCNC: 11.7 G/DL (ref 13–17.7)
IMM GRANULOCYTES # BLD AUTO: 0 X10E3/UL (ref 0–0.1)
IMM GRANULOCYTES NFR BLD AUTO: 0 %
LDLC SERPL CALC-MCNC: 68 MG/DL (ref 0–99)
LYMPHOCYTES # BLD AUTO: 1.3 X10E3/UL (ref 0.7–3.1)
LYMPHOCYTES NFR BLD AUTO: 22 %
MCH RBC QN AUTO: 29.3 PG (ref 26.6–33)
MCHC RBC AUTO-ENTMCNC: 31.7 G/DL (ref 31.5–35.7)
MCV RBC AUTO: 93 FL (ref 79–97)
MONOCYTES # BLD AUTO: 0.4 X10E3/UL (ref 0.1–0.9)
MONOCYTES NFR BLD AUTO: 7 %
NEUTROPHILS # BLD AUTO: 3.8 X10E3/UL (ref 1.4–7)
NEUTROPHILS NFR BLD AUTO: 66 %
PLATELET # BLD AUTO: 200 X10E3/UL (ref 150–450)
POTASSIUM SERPL-SCNC: 4.7 MMOL/L (ref 3.5–5.2)
PROT SERPL-MCNC: 7.4 G/DL (ref 6–8.5)
RBC # BLD AUTO: 3.99 X10E6/UL (ref 4.14–5.8)
SODIUM SERPL-SCNC: 142 MMOL/L (ref 134–144)
TRIGL SERPL-MCNC: 111 MG/DL (ref 0–149)
VLDLC SERPL CALC-MCNC: 20 MG/DL (ref 5–40)
WBC # BLD AUTO: 5.7 X10E3/UL (ref 3.4–10.8)

## 2024-04-19 RX ORDER — LINAGLIPTIN 5 MG/1
5 TABLET, FILM COATED ORAL DAILY
Qty: 90 TABLET | Refills: 0 | Status: SHIPPED | OUTPATIENT
Start: 2024-04-19

## 2024-04-20 NOTE — ASSESSMENT & PLAN NOTE
BLE trace pitting edema.  Decrease table salt and foods high in salt.  Increase activity daily.  Elevate BLE when sitting for extended timeframe.  Will continue to monitor.

## 2024-04-29 RX ORDER — BUMETANIDE 2 MG/1
2 TABLET ORAL DAILY
Qty: 90 TABLET | Refills: 0 | Status: SHIPPED | OUTPATIENT
Start: 2024-04-29

## 2024-05-07 ENCOUNTER — HOSPITAL ENCOUNTER (OUTPATIENT)
Facility: HOSPITAL | Age: 79
Discharge: HOME | End: 2024-05-07
Admitting: NURSE PRACTITIONER
Payer: MEDICARE

## 2024-05-07 DIAGNOSIS — R09.89 CHEST CONGESTION: ICD-10-CM

## 2024-05-07 DIAGNOSIS — R09.89 BILATERAL RALES: ICD-10-CM

## 2024-05-07 DIAGNOSIS — R91.8 PULMONARY INFILTRATES ON CXR: ICD-10-CM

## 2024-05-07 PROCEDURE — 71250 CT THORAX DX C-: CPT

## 2024-05-17 ENCOUNTER — OFFICE VISIT (OUTPATIENT)
Dept: FAMILY MEDICINE CLINIC | Facility: CLINIC | Age: 79
End: 2024-05-17
Payer: MEDICARE

## 2024-05-17 VITALS
HEART RATE: 73 BPM | OXYGEN SATURATION: 92 % | WEIGHT: 301.6 LBS | DIASTOLIC BLOOD PRESSURE: 68 MMHG | HEIGHT: 73 IN | BODY MASS INDEX: 39.97 KG/M2 | SYSTOLIC BLOOD PRESSURE: 142 MMHG

## 2024-05-17 DIAGNOSIS — Z00.00 ENCOUNTER FOR ANNUAL PHYSICAL EXAM: ICD-10-CM

## 2024-05-17 DIAGNOSIS — J92.9 PLEURAL THICKENING: ICD-10-CM

## 2024-05-17 DIAGNOSIS — E78.2 MIXED HYPERLIPIDEMIA: Chronic | ICD-10-CM

## 2024-05-17 DIAGNOSIS — Z00.00 HEALTH CARE MAINTENANCE: ICD-10-CM

## 2024-05-17 DIAGNOSIS — Z95.3 H/O AORTIC VALVE REPLACEMENT WITH PORCINE VALVE: ICD-10-CM

## 2024-05-17 DIAGNOSIS — J98.11 ATELECTASIS OF BOTH LUNGS: ICD-10-CM

## 2024-05-17 DIAGNOSIS — I10 ESSENTIAL HYPERTENSION: Chronic | ICD-10-CM

## 2024-05-17 DIAGNOSIS — Z00.00 ENCOUNTER FOR SUBSEQUENT ANNUAL WELLNESS VISIT (AWV) IN MEDICARE PATIENT: Primary | ICD-10-CM

## 2024-05-17 DIAGNOSIS — E11.59 TYPE 2 DIABETES MELLITUS WITH OTHER CIRCULATORY COMPLICATION, WITHOUT LONG-TERM CURRENT USE OF INSULIN: Chronic | ICD-10-CM

## 2024-05-17 DIAGNOSIS — N18.32 STAGE 3B CHRONIC KIDNEY DISEASE: Chronic | ICD-10-CM

## 2024-05-17 DIAGNOSIS — Z87.01 HISTORY OF PNEUMONIA: ICD-10-CM

## 2024-05-17 DIAGNOSIS — R01.1 CARDIAC MURMUR: ICD-10-CM

## 2024-05-17 DIAGNOSIS — R89.9 ABNORMAL PLEURAL FLUID: ICD-10-CM

## 2024-05-17 DIAGNOSIS — R29.898 WEAKNESS OF BOTH LEGS: ICD-10-CM

## 2024-05-17 DIAGNOSIS — I50.32 CHRONIC DIASTOLIC (CONGESTIVE) HEART FAILURE: Chronic | ICD-10-CM

## 2024-05-17 DIAGNOSIS — I87.2 CHRONIC VENOUS INSUFFICIENCY: ICD-10-CM

## 2024-05-17 NOTE — ASSESSMENT & PLAN NOTE
Diabetes is improving with treatment.  A1c 6.5 on prior lab from 3/2024.  Continue current treatment regimen.  Reminded to bring in blood sugar diary at next visit.  Recommended an ADA diet.  Regular aerobic exercise.  Discussed ways to avoid symptomatic hypoglycemia.  Discussed foot care.  Reminded to get yearly retinal exam.  Diabetes will be re-assessed in 3 months

## 2024-05-17 NOTE — PROGRESS NOTES
The ABCs of the Annual Wellness Visit  Subsequent Medicare Wellness Visit    Subjective    Rock Maciel Jr. is a 78 y.o. male who presents for a Subsequent Medicare Wellness Visit.    The following portions of the patient's history were reviewed and   updated as appropriate: allergies, current medications, past family history, past medical history, past social history, past surgical history, and problem list.    Compared to one year ago, the patient feels his physical   health is better.    Compared to one year ago, the patient feels his mental   health is better.    Recent Hospitalizations:  He was not admitted to the hospital during the last year.       Current Medical Providers:  Patient Care Team:  Denise Dickson APRN as PCP - General (Nurse Practitioner)  Azam Banegas Jr., MD as Consulting Physician (Cardiology)  Jamil Stevens MD as Consulting Physician (Nephrology)    Outpatient Medications Prior to Visit   Medication Sig Dispense Refill    amLODIPine (NORVASC) 10 MG tablet Take 1 tablet by mouth Daily. 90 tablet 1    atorvastatin (LIPITOR) 20 MG tablet Take 1 tablet by mouth Every Night. 90 tablet 1    bumetanide (BUMEX) 2 MG tablet TAKE 1 TABLET DAILY 90 tablet 0    Cholecalciferol 25 MCG (1000 UT) tablet Take 1 tablet by mouth Daily.      clopidogrel (PLAVIX) 75 MG tablet Take 1 tablet by mouth Daily. 90 tablet 1    glipizide (GLUCOTROL) 5 MG tablet Take 1 tablet by mouth 2 (Two) Times a Day Before Meals. 180 tablet 1    glucose blood (Accu-Chek Keshia Plus) test strip Use to test blood sugar twice daily for diabetes 100 each 11    hydrALAZINE (APRESOLINE) 100 MG tablet Take 1 tablet by mouth 3 (Three) Times a Day.      metoprolol tartrate (LOPRESSOR) 25 MG tablet Take 1 tablet by mouth 2 (Two) Times a Day. 180 tablet 1    Tradjenta 5 MG tablet tablet TAKE 1 TABLET DAILY 90 tablet 0    vitamin C (ASCORBIC ACID) 250 MG tablet Take 1 tablet by mouth Daily.      Zinc 50 MG tablet       losartan  (COZAAR) 50 MG tablet Take 1.5 tablets by mouth Daily.       No facility-administered medications prior to visit.       No opioid medication identified on active medication list. I have reviewed chart for other potential  high risk medication/s and harmful drug interactions in the elderly.        Aspirin is not on active medication list.  Aspirin use is not indicated based on review of current medical condition/s. Risk of harm outweighs potential benefits.  .    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    Chronic venous insufficiency    Nonrheumatic aortic valve stenosis    Carotid stenosis, asymptomatic    Bradycardia    Hyperlipidemia    Bilateral carotid artery disease    Abnormal cardiovascular stress test    H/O aortic valve replacement with porcine valve    Charcot-Davida-Tooth disease-like deformity of foot    Amputated toe of right foot    Fall    Non-traumatic rhabdomyolysis    S/P CABG x 2    CKD (chronic kidney disease) stage 3, GFR 30-59 ml/min    Rupture of left quadriceps tendon    Constipation    Wound of right leg    Anemia    Chronic diastolic (congestive) heart failure    Amputated toe of left foot    Gas gangrene    Cellulitis of left lower limb    Acquired absence of left foot    Bilateral lower extremity edema    CKD (chronic kidney disease) stage 3, GFR 30-59 ml/min    History of pneumonia    Atelectasis of both lungs    Abnormal pleural fluid    Pleural thickening     Advance Care Planning   Advance Care Planning     Advance Directive is not on file.  ACP discussion was held with the patient during  "this visit. Patient has an advance directive (not in EMR), copy requested.     Objective    Vitals:    24 1318   BP: 142/68   BP Location: Right arm   Patient Position: Sitting   Cuff Size: Adult   Pulse: 73   SpO2: 92%   Weight: (!) 137 kg (301 lb 9.6 oz)   Height: 185.4 cm (72.99\")     Estimated body mass index is 39.8 kg/m² as calculated from the following:    Height as of this encounter: 185.4 cm (72.99\").    Weight as of this encounter: 137 kg (301 lb 9.6 oz).    Class 2 Severe Obesity (BMI >=35 and <=39.9). Obesity-related health conditions include the following: obstructive sleep apnea, hypertension, coronary heart disease, diabetes mellitus, dyslipidemias, GERD, and lower extremity venous stasis disease. Obesity is unchanged. BMI is is above average; BMI management plan is completed. We discussed low calorie, low carb based diet program, portion control, increasing exercise, and Information on healthy weight added to patient's after visit summary.      Does the patient have evidence of cognitive impairment? No    Lab Results   Component Value Date    CHLPL 130 2024    TRIG 111 2024    HDL 42 2024    LDL 68 2024    VLDL 20 2024    HGBA1C 6.50 (H) 2024        HEALTH RISK ASSESSMENT    Smoking Status:  Social History     Tobacco Use   Smoking Status Never    Passive exposure: Never   Smokeless Tobacco Never     Alcohol Consumption:  Social History     Substance and Sexual Activity   Alcohol Use Yes    Comment: either one glass wine or one glass liquor per  day     Fall Risk Screen:    DOADI Fall Risk Assessment was completed, and patient is at LOW risk for falls.Assessment completed on:2024    Depression Screenin/17/2024     1:20 PM   PHQ-2/PHQ-9 Depression Screening   Little Interest or Pleasure in Doing Things 0-->not at all   Feeling Down, Depressed or Hopeless 0-->not at all   PHQ-9: Brief Depression Severity Measure Score 0       Health Habits and " Functional and Cognitive Screenin/17/2024     1:20 PM   Functional & Cognitive Status   Do you have difficulty preparing food and eating? No   Do you have difficulty bathing yourself, getting dressed or grooming yourself? No   Do you have difficulty using the toilet? No   Do you have difficulty moving around from place to place? No   Do you have trouble with steps or getting out of a bed or a chair? No   Current Diet Low Carb Diet   Dental Exam Up to date   Eye Exam Not up to date   Exercise (times per week) 0 times per week   Current Exercises Include No Regular Exercise   Do you need help using the phone?  No   Are you deaf or do you have serious difficulty hearing?  No   Do you need help to go to places out of walking distance? Yes   Do you need help shopping? No   Do you need help preparing meals?  No   Do you need help with housework?  No   Do you need help with laundry? No   Do you need help taking your medications? No   Do you need help managing money? No   Do you ever drive or ride in a car without wearing a seat belt? No   Have you felt unusual stress, anger or loneliness in the last month? No   Who do you live with? Alone   If you need help, do you have trouble finding someone available to you? No   Have you been bothered in the last four weeks by sexual problems? No   Do you have difficulty concentrating, remembering or making decisions? No       Age-appropriate Screening Schedule:  Refer to the list below for future screening recommendations based on patient's age, sex and/or medical conditions. Orders for these recommended tests are listed in the plan section. The patient has been provided with a written plan.    Health Maintenance   Topic Date Due    RSV Vaccine - Adults (1 - 1-dose 60+ series) Never done    COVID-19 Vaccine (2023- season) 2023    ANNUAL WELLNESS VISIT  2024    DIABETIC EYE EXAM  2024    INFLUENZA VACCINE  2024    HEMOGLOBIN A1C  2024     LIPID PANEL  04/16/2025    URINE MICROALBUMIN  05/17/2025    BMI FOLLOWUP  05/17/2025    TDAP/TD VACCINES (2 - Td or Tdap) 06/24/2025    COLORECTAL CANCER SCREENING  10/01/2028    HEPATITIS C SCREENING  Completed    Pneumococcal Vaccine 65+  Completed    ZOSTER VACCINE  Completed                  CMS Preventative Services Quick Reference  Risk Factors Identified During Encounter  Immunizations Discussed/Encouraged: COVID19 and RSV (Respiratory Syncytial Virus) recommended and patient declined today.  Dental Screening Recommended - He just had dental cleaning last week.  He has dental screenings every 6 months.    Vision Screening Recommended - He admits has to schedule vision screening soon.  He usually gets them yearly in Feb/March, but has not had it done this year.  He agreed to schedule vision screening within the next month.    The above risks/problems have been discussed with the patient.  Pertinent information has been shared with the patient in the After Visit Summary.  An After Visit Summary and PPPS were made available to the patient.    Follow Up:   Next Medicare Wellness visit to be scheduled in 1 year.       Additional E&M Note during same encounter follows:  Patient has multiple medical problems which are significant and separately identifiable that require additional work above and beyond the Medicare Wellness Visit.      Chief Complaint  Medicare Wellness-subsequent and Annual Exam    Subjective        HPI  Rock KELLY Maciel Jr. is also being seen today for Annual Physical today.    Review of Systems   HENT: Negative.     Eyes: Negative.    Respiratory: Negative.     Cardiovascular:  Positive for leg swelling. Negative for chest pain and palpitations.        Mild leg swelling controlled with knee high compression socks.   Gastrointestinal: Negative.    Endocrine: Negative.    Genitourinary: Negative.    Musculoskeletal:  Positive for back pain and gait problem. Negative for arthralgias, joint swelling,  "myalgias, neck pain and neck stiffness.        Intermittent low back pain controlled with home massager; ambulates with walker due to intermittent low back pain, no complaint regarding low back pain today.   Allergic/Immunologic: Negative for environmental allergies, food allergies and immunocompromised state.   Neurological:  Positive for weakness. Negative for dizziness, tremors, seizures, syncope, facial asymmetry, speech difficulty, light-headedness, numbness and confusion.        Lower extremity weakness, ambulates with walker.   Hematological:  Negative for adenopathy. Does not bruise/bleed easily.   Psychiatric/Behavioral: Negative.  Negative for agitation, behavioral problems, decreased concentration, dysphoric mood, hallucinations, self-injury, sleep disturbance and suicidal ideas. The patient is not nervous/anxious and is not hyperactive.        Objective   Vital Signs:  /68 (BP Location: Right arm, Patient Position: Sitting, Cuff Size: Adult)   Pulse 73   Ht 185.4 cm (72.99\")   Wt (!) 137 kg (301 lb 9.6 oz)   SpO2 92%   BMI 39.80 kg/m²     Physical Exam  Vitals and nursing note reviewed.   Constitutional:       General: He is not in acute distress.     Appearance: Normal appearance. He is obese. He is not ill-appearing.   HENT:      Head: Normocephalic and atraumatic.      Right Ear: Tympanic membrane, ear canal and external ear normal. There is no impacted cerumen.      Left Ear: Tympanic membrane, ear canal and external ear normal. There is no impacted cerumen.      Nose: Nose normal. No congestion or rhinorrhea.      Mouth/Throat:      Mouth: Mucous membranes are moist.      Pharynx: No oropharyngeal exudate or posterior oropharyngeal erythema.   Eyes:      General: No scleral icterus.        Right eye: No discharge.         Left eye: No discharge.      Extraocular Movements: Extraocular movements intact.      Conjunctiva/sclera: Conjunctivae normal.   Neck:      Thyroid: No thyroid mass, " thyromegaly or thyroid tenderness.      Vascular: No carotid bruit.   Cardiovascular:      Rate and Rhythm: Normal rate and regular rhythm.      Pulses: Normal pulses.      Heart sounds: Murmur heard.      Systolic murmur is present with a grade of 3/6.      No gallop.      Comments: Trace pitting edema  Pulmonary:      Effort: Pulmonary effort is normal. No respiratory distress.      Breath sounds: Normal breath sounds. No wheezing.   Abdominal:      General: Bowel sounds are normal. There is no distension.      Palpations: Abdomen is soft. There is no mass.      Tenderness: There is no abdominal tenderness. There is no right CVA tenderness, left CVA tenderness, guarding or rebound.   Genitourinary:     Comments: Deferred.  Musculoskeletal:         General: No swelling or tenderness.      Cervical back: Normal range of motion and neck supple. No rigidity or tenderness.      Right lower leg: Edema present.      Left lower leg: Edema present.      Comments: BLE trace pitting edema - wearing bilateral knee high compression socks.  Lower extremity weakness; ambulates with rolling walker.   Lymphadenopathy:      Cervical: No cervical adenopathy.   Skin:     General: Skin is warm and dry.      Capillary Refill: Capillary refill takes less than 2 seconds.      Findings: No lesion.      Comments: Right upper back lump approx 1.5cm and left lower back epidermoid cyst approx 1cm.  Approx 3mm round multicolored (dark brown and light brown) papule on scalp behind left ear.   Neurological:      General: No focal deficit present.      Mental Status: He is alert.      Cranial Nerves: No cranial nerve deficit.      Motor: No weakness.      Gait: Gait normal.   Psychiatric:         Mood and Affect: Mood normal.         Behavior: Behavior normal.         The following data was reviewed by: ZEINA Mccormack on 05/17/2024:    CBC & Differential (04/16/2024 14:17)  Comprehensive Metabolic Panel (04/16/2024 14:17)  Lipid Panel  (04/16/2024 14:17)  BNP ([facility] Only) (04/16/2024 14:17)  Microalbumin / Creatinine Urine Ratio - Urine, Clean Catch (05/17/2024 14:30)  Hemoglobin A1c (03/05/2024 14:19)     XR Chest PA & Lateral (04/05/2024 12:35)  CT Chest Without Contrast Diagnostic (05/07/2024 13:41)               Assessment and Plan   Assessment & Plan  Encounter for subsequent annual wellness visit (AWV) in Medicare patient    Encounter for annual physical exam    Essential hypertension  Hypertension is stable on Amlodipine 10mg daily, Bumex 2mg daily, Hydralyzine 100mg TID, Losartan 50mg and Metoprolol 25mg BID.  Continue current treatment plan.  Decrease table salt and foods high in salt.  Weight loss.  Increase activity daily.  Blood pressure will be re-assessed in 3 months.    Mixed hyperlipidemia  Lipid abnormalities are controlled on Atorvastatin 20mg nightly.  Encouraged lifestyle changes.  Continue current treatment plan.  Will re-assess lipids today.  Type 2 diabetes mellitus with other circulatory complication, without long-term current use of insulin  Diabetes is improving with treatment.  A1c 6.5 on prior lab from 3/2024.  Continue current treatment regimen.  Reminded to bring in blood sugar diary at next visit.  Recommended an ADA diet.  Regular aerobic exercise.  Discussed ways to avoid symptomatic hypoglycemia.  Discussed foot care.  Reminded to get yearly retinal exam.  Diabetes will be re-assessed in 3 months  Stage 3b chronic kidney disease  Stable; Cr. 1.99, GFR 34, BUN 58.  Encouraged to take Bumex 2mg daily as directed.  Increase water intake (w/in parameters) decrease caffeine.  Decrease salt.  No NSAID use.  Followed by Nephrology.     Chronic diastolic (congestive) heart failure  Stable; .4 on prior lab from 4/16/24.  Encourage to take Bumex 2mg daily as directed.  Followed by Dr. Toney    Cardiac murmur  Asymptomatic; HR 73, 02Sat 92%  Followed by Dr. Toney, Cardiology.    H/O aortic valve  replacement with porcine valve  Prophylactic Dental Procedure - Rx: Amoxicillin     Pleural thickening  Referral to Pulmonary for eval/treat.    Abnormal pleural fluid  Referral to Pulmonary for eval/treat.    Atelectasis of both lungs  Referral to Pulmonary for eval/treat.    History of pneumonia    Chronic venous insufficiency  Decrease table salt and foods high in salt.  Increase activity daily as tolerated.  Elevate BLE when sitting for extended timeframe.  Will continue to monitor.     Weakness of both legs  Increase activity daily as tolerated.  Ambulates with walker.  Discussed referral to PT and he declined.    Health care maintenance  Use sunscreen w/sun expsure.  Working smoke/Carbon Monoxide detectors in home.  Wear seatbelt.  Annual Vision/Dental exam.  Increase activity 30min/day x 5 days.        Orders Placed This Encounter   Procedures    Microalbumin / Creatinine Urine Ratio - Urine, Clean Catch    Ambulatory Referral to Pulmonology     New Medications Ordered This Visit   Medications    losartan (COZAAR) 50 MG tablet     Sig: Take 1.5 tablets by mouth Daily.     Dispense:  135 tablet     Refill:  1    amoxicillin (AMOXIL) 500 MG capsule     Sig: Take 4 capsules by mouth 1 (One) Time for 1 dose. PRIOR TO DENTAL PROCEDURE.     Dispense:  4 capsule     Refill:  1             Follow Up   Return in about 6 months (around 11/17/2024) for Next scheduled follow up Chronic Care.  Patient was given instructions and counseling regarding his condition or for health maintenance advice. Please see specific information pulled into the AVS if appropriate.

## 2024-05-17 NOTE — ASSESSMENT & PLAN NOTE
Stable; Cr. 1.99, GFR 34, BUN 58.  Encouraged to take Bumex 2mg daily as directed.  Increase water intake (w/in parameters) decrease caffeine.  Decrease salt.  No NSAID use.  Followed by Nephrology.

## 2024-05-17 NOTE — ASSESSMENT & PLAN NOTE
Hypertension is stable on Amlodipine 10mg daily, Bumex 2mg daily, Hydralyzine 100mg TID, Losartan 50mg and Metoprolol 25mg BID.  Continue current treatment plan.  Decrease table salt and foods high in salt.  Weight loss.  Increase activity daily.  Blood pressure will be re-assessed in 3 months.

## 2024-05-18 LAB
ALBUMIN/CREAT UR: 566 MG/G CREAT (ref 0–29)
CREAT UR-MCNC: 28.1 MG/DL
MICROALBUMIN UR-MCNC: 159.1 UG/ML

## 2024-05-19 PROBLEM — J92.9 PLEURAL THICKENING: Status: ACTIVE | Noted: 2024-05-19

## 2024-05-19 PROBLEM — J98.11 ATELECTASIS OF BOTH LUNGS: Status: ACTIVE | Noted: 2024-05-19

## 2024-05-19 PROBLEM — R89.9 ABNORMAL PLEURAL FLUID: Status: ACTIVE | Noted: 2024-05-19

## 2024-05-19 RX ORDER — LOSARTAN POTASSIUM 50 MG/1
75 TABLET ORAL DAILY
Qty: 135 TABLET | Refills: 1 | Status: SHIPPED | OUTPATIENT
Start: 2024-05-19

## 2024-05-19 RX ORDER — AMOXICILLIN 500 MG/1
2000 CAPSULE ORAL ONCE
Qty: 4 CAPSULE | Refills: 1 | Status: SHIPPED | OUTPATIENT
Start: 2024-05-19 | End: 2024-05-19

## 2024-05-19 NOTE — ASSESSMENT & PLAN NOTE
Stable; .4 on prior lab from 4/16/24.  Encourage to take Bumex 2mg daily as directed.  Followed by Dr. Toney

## 2024-05-24 NOTE — ASSESSMENT & PLAN NOTE
Decrease table salt and foods high in salt.  Increase activity daily as tolerated.  Elevate BLE when sitting for extended timeframe.  Will continue to monitor.

## 2024-05-24 NOTE — ASSESSMENT & PLAN NOTE
I signed up for this patient in error. I did not evaluate or participate in the care of this patient.      Reza Gutierrez, DO  Emergency Medicine Resident Referral to Pulmonary for eval/treat.

## 2024-05-24 NOTE — ASSESSMENT & PLAN NOTE
Lipid abnormalities are controlled on Atorvastatin 20mg nightly.  Encouraged lifestyle changes.  Continue current treatment plan.  Will re-assess lipids today.

## 2024-05-30 ENCOUNTER — TELEPHONE (OUTPATIENT)
Dept: FAMILY MEDICINE CLINIC | Facility: CLINIC | Age: 79
End: 2024-05-30

## 2024-05-30 NOTE — TELEPHONE ENCOUNTER
Caller: Rock Maciel Jr.    Relationship: Self    Best call back number: [phone]    What medication are you requesting: AMOXICILLIN 500 MG    If a prescription is needed, what is your preferred pharmacy and phone number: [facility] [phone]  - [phone]     Additional notes: PATIENT IS REQUESTING A SUPPLY OF AMOXICILLIN TO HAVE ON HAND FOR DENTIST APPOINTMENTS. THE PATIENT STATES THAT HE HAS TO TAKE 4 TABLETS BEFORE EACH VISIT, AND SOMETIMES HE HAS TO GO TO MORE THAN ONE VISIT A YEAR.

## 2024-05-30 NOTE — TELEPHONE ENCOUNTER
Please call patient and advise him that I sent in Amoxicillin 500mg - 4 tablets one time dose, with 1 refill, for Dental Procedure, to his [facility] pharmacy on 5/19/24 and they acknowledged their receipt of this prescription as confirmed in our chart.  Please have him call [facility] to find out why he has not gotten this prescription.

## 2024-05-31 ENCOUNTER — TELEPHONE (OUTPATIENT)
Dept: FAMILY MEDICINE CLINIC | Facility: CLINIC | Age: 79
End: 2024-05-31
Payer: MEDICARE

## 2024-05-31 NOTE — TELEPHONE ENCOUNTER
CALLED PATIENT AND LEFT A MESSAGE THAT MEDICATION WAS SENT TO  PHARMACY AND ADVISING PATIENT IF HE HAS ANY QUESTIONS OR CONCERNS THEN GIVE US A CALL BACK AT THE OFFICE

## 2024-05-31 NOTE — TELEPHONE ENCOUNTER
REGARDING PRESCRIPTION. LEFT PATIENT A VOICEMAIL JUST STATING THAT JESUS SENT IN THE AMOXICILLIN TO PHARMACY ON FILE FOR UPCOMING DENTAL PROCEDURE.

## 2024-06-14 RX ORDER — AMLODIPINE BESYLATE 10 MG/1
10 TABLET ORAL DAILY
Qty: 90 TABLET | Refills: 1 | Status: SHIPPED | OUTPATIENT
Start: 2024-06-14

## 2024-06-14 RX ORDER — GLIPIZIDE 5 MG/1
5 TABLET ORAL
Qty: 180 TABLET | Refills: 1 | Status: SHIPPED | OUTPATIENT
Start: 2024-06-14

## 2024-06-14 NOTE — TELEPHONE ENCOUNTER
Please review and advise as patient stated he is completely out and wants it sent to the [facility] pharmacy

## 2024-06-14 NOTE — TELEPHONE ENCOUNTER
Caller: Rock Maciel Jr.    Relationship: Self    Best call back number: [phone] (Home)     Requested Prescriptions:   amLODIPine (NORVASC) 10 MG tablet     glipizide (GLUCOTROL) 5 MG tablet        Pharmacy where request should be sent:  [facility]/pharmacy #[id number] [place], [place] - [address] AT IN THE [place] - [phone] [facility] [phone]      Last office visit with prescribing clinician: 5/17/2024   Last telemedicine visit with prescribing clinician: Visit date not found   Next office visit with prescribing clinician: 11/18/2024     Additional details provided by patient: PATIENT CALLED TO REQUEST A MEDICATION REFILL ON HIS MEDICATION WITH A 90 DAY SUPPLY. PATIENT STATES THAT HE IS COMPLETELY OUT MEDICATION.        Does the patient have less than a 3 day supply:  [x] Yes  [] No    Would you like a call back once the refill request has been completed: [] Yes [] No    If the office needs to give you a call back, can they leave a voicemail: [] Yes [] No    Naz Jama Rep   06/14/24 09:47 EDT         THANKS

## 2024-07-01 RX ORDER — LINAGLIPTIN 5 MG/1
5 TABLET, FILM COATED ORAL DAILY
Qty: 90 TABLET | Refills: 0 | Status: SHIPPED | OUTPATIENT
Start: 2024-07-01

## 2024-07-29 RX ORDER — BLOOD SUGAR DIAGNOSTIC
STRIP MISCELLANEOUS
Qty: 100 EACH | Refills: 8 | Status: SHIPPED | OUTPATIENT
Start: 2024-07-29

## 2024-07-29 RX ORDER — BUMETANIDE 2 MG/1
2 TABLET ORAL DAILY
Qty: 90 TABLET | Refills: 0 | Status: SHIPPED | OUTPATIENT
Start: 2024-07-29

## 2024-09-08 DIAGNOSIS — N18.32 STAGE 3B CHRONIC KIDNEY DISEASE: Chronic | ICD-10-CM

## 2024-09-08 DIAGNOSIS — I50.32 CHRONIC DIASTOLIC (CONGESTIVE) HEART FAILURE: Chronic | ICD-10-CM

## 2024-09-08 DIAGNOSIS — I10 ESSENTIAL HYPERTENSION: Chronic | ICD-10-CM

## 2024-09-09 RX ORDER — LOSARTAN POTASSIUM 50 MG/1
50 TABLET ORAL DAILY
Qty: 30 TABLET | OUTPATIENT
Start: 2024-09-09

## 2024-09-13 DIAGNOSIS — I50.32 CHRONIC DIASTOLIC (CONGESTIVE) HEART FAILURE: Chronic | ICD-10-CM

## 2024-09-13 DIAGNOSIS — I10 ESSENTIAL HYPERTENSION: Chronic | ICD-10-CM

## 2024-09-13 DIAGNOSIS — N18.32 STAGE 3B CHRONIC KIDNEY DISEASE: Chronic | ICD-10-CM

## 2024-09-13 RX ORDER — LOSARTAN POTASSIUM 50 MG/1
75 TABLET ORAL DAILY
Qty: 135 TABLET | Refills: 1 | Status: CANCELLED | OUTPATIENT
Start: 2024-09-13

## 2024-09-13 NOTE — TELEPHONE ENCOUNTER

## 2024-09-19 RX ORDER — CLOPIDOGREL BISULFATE 75 MG/1
75 TABLET ORAL DAILY
Qty: 90 TABLET | Refills: 1 | Status: SHIPPED | OUTPATIENT
Start: 2024-09-19

## 2024-09-19 RX ORDER — ATORVASTATIN CALCIUM 20 MG/1
20 TABLET, FILM COATED ORAL NIGHTLY
Qty: 30 TABLET | Refills: 0 | Status: SHIPPED | OUTPATIENT
Start: 2024-09-19

## 2024-09-19 RX ORDER — METOPROLOL TARTRATE 25 MG/1
25 TABLET, FILM COATED ORAL 2 TIMES DAILY
Qty: 180 TABLET | Refills: 1 | Status: SHIPPED | OUTPATIENT
Start: 2024-09-19

## 2024-10-03 ENCOUNTER — OFFICE VISIT (OUTPATIENT)
Dept: CARDIOLOGY | Facility: CLINIC | Age: 79
End: 2024-10-03
Payer: MEDICARE

## 2024-10-03 ENCOUNTER — HOSPITAL ENCOUNTER (OUTPATIENT)
Dept: CARDIOLOGY | Facility: HOSPITAL | Age: 79
Discharge: HOME | End: 2024-10-03
Payer: MEDICARE

## 2024-10-03 VITALS
HEART RATE: 88 BPM | BODY MASS INDEX: 40.02 KG/M2 | WEIGHT: 302 LBS | OXYGEN SATURATION: 92 % | HEIGHT: 73 IN | SYSTOLIC BLOOD PRESSURE: 135 MMHG | DIASTOLIC BLOOD PRESSURE: 71 MMHG

## 2024-10-03 DIAGNOSIS — I10 ESSENTIAL HYPERTENSION: ICD-10-CM

## 2024-10-03 DIAGNOSIS — I25.10 ARTERIOSCLEROSIS OF CORONARY ARTERY: Primary | ICD-10-CM

## 2024-10-03 DIAGNOSIS — I25.10 ARTERIOSCLEROSIS OF CORONARY ARTERY: ICD-10-CM

## 2024-10-03 DIAGNOSIS — I35.0 NONRHEUMATIC AORTIC VALVE STENOSIS: ICD-10-CM

## 2024-10-03 DIAGNOSIS — R94.31 ABNORMAL ECG: ICD-10-CM

## 2024-10-03 DIAGNOSIS — I48.19 ATRIAL FIBRILLATION, PERSISTENT: ICD-10-CM

## 2024-10-03 DIAGNOSIS — E78.5 HYPERLIPIDEMIA, UNSPECIFIED HYPERLIPIDEMIA TYPE: ICD-10-CM

## 2024-10-03 DIAGNOSIS — Z79.01 CHRONIC ANTICOAGULATION: ICD-10-CM

## 2024-10-03 PROCEDURE — 93306 TTE W/DOPPLER COMPLETE: CPT

## 2024-10-03 PROCEDURE — 93306 TTE W/DOPPLER COMPLETE: CPT | Performed by: INTERNAL MEDICINE

## 2024-10-03 RX ORDER — ERGOCALCIFEROL 1.25 MG/1
CAPSULE, LIQUID FILLED ORAL
COMMUNITY
Start: 2024-07-29

## 2024-10-03 RX ORDER — METOPROLOL SUCCINATE 25 MG/1
25 TABLET, EXTENDED RELEASE ORAL
COMMUNITY

## 2024-10-03 RX ORDER — TERAZOSIN 5 MG/1
CAPSULE ORAL
COMMUNITY
Start: 2024-08-01

## 2024-10-03 NOTE — PROGRESS NOTES
Subjective:        Rock Maciel Jr. is a 79 y.o. male who here for follow up    CC  Atrial fibrillation  HPI  79-year-old male with abnormal EKG atherosclerosis hypertension hyperlipidemia here for the follow-up denies any chest pains or tightness in the chest     Problems Addressed this Visit          Cardiac and Vasculature    Abnormal ECG    Arteriosclerosis of coronary artery - Primary    Relevant Medications    metoprolol succinate XL (TOPROL-XL) 25 MG 24 hr tablet    Essential hypertension    Relevant Medications    metoprolol succinate XL (TOPROL-XL) 25 MG 24 hr tablet    terazosin (HYTRIN) 5 MG capsule    Hyperlipidemia    Atrial fibrillation, persistent    Relevant Medications    metoprolol succinate XL (TOPROL-XL) 25 MG 24 hr tablet       Coag and Thromboembolic    Chronic anticoagulation     Diagnoses         Codes Comments    Arteriosclerosis of coronary artery    -  Primary ICD-10-CM: I25.10  ICD-9-CM: 414.00     Essential hypertension     ICD-10-CM: I10  ICD-9-CM: 401.9     Abnormal ECG     ICD-10-CM: R94.31  ICD-9-CM: 794.31     Hyperlipidemia, unspecified hyperlipidemia type     ICD-10-CM: E78.5  ICD-9-CM: 272.4     Atrial fibrillation, persistent     ICD-10-CM: I48.19  ICD-9-CM: 427.31     Chronic anticoagulation     ICD-10-CM: Z79.01  ICD-9-CM: V58.61           .    The following portions of the patient's history were reviewed and updated as appropriate: allergies, current medications, past family history, past medical history, past social history, past surgical history and problem list.    Past Medical History:   Diagnosis Date    Allergic rhinitis     Bronchitis     Coronary artery disease     Diabetes mellitus 2001    DM (diabetes mellitus)     Encounter for annual health examination 05/06/2014    Annual Health Assessment    Gout     Hiatal hernia     History of MRSA infection     RIGHT FOOT 2010    Hyperlipidemia     Hypertension     Murmur     Pneumothorax on right     Sleep apnea     pt  wears CPAP at night    Wellness examination 06/24/2015    Annual Wellness Visit     reports that he has never smoked. He has never been exposed to tobacco smoke. He has never used smokeless tobacco. He reports current alcohol use. He reports that he does not use drugs.   Family History   Problem Relation Age of Onset    Hypertension Father        Review of Systems  Constitutional: No wt loss, fever, fatigue  Gastrointestinal: No nausea, abdominal pain  Behavioral/Psych: No insomnia or anxiety   Cardiovascular no chest pains or tightness in the chest  Objective:       Physical Exam  There were no vitals taken for this visit.  General appearance: No acute changes   Neck: Trachea midline; NECK, supple, no thyromegaly or lymphadenopathy   Lungs: Normal size and shape, normal breath sounds, equal distribution of air, no rales and rhonchi   CV: S1-S2 irregular, no murmurs, no rub, no gallop   Abdomen: Soft, nontender; no masses , no abnormal abdominal sounds   Extremities: No deformity , normal color , no peripheral edema   Skin: Normal temperature, turgor and texture; no rash, ulcers            ECG 12 Lead    Date/Time: 10/3/2024 1:46 PM  Performed by: Jo Toney MD    Authorized by: Jo Toney MD  Comparison: compared with previous ECG   Comparison to previous ECG: AFIB NEW  Rhythm: atrial fibrillation    Clinical impression: abnormal EKG            Echocardiogram:    Results for orders placed in visit on 10/30/23    Adult Transthoracic Echo Complete W/ Cont if Necessary Per Protocol    Interpretation Summary    Left ventricular ejection fraction appears to be 51 - 55%.    The left ventricular cavity is mild to moderately dilated.    Left ventricular diastolic function was normal.    The left atrial cavity is moderately dilated.    Left atrial volume is moderately increased.    Aortic valve area is 2.1 cm2.    Peak velocity of the flow distal to the aortic valve is 286 cm/s. Aortic valve maximum  pressure gradient is 33 mmHg. Aortic valve mean pressure gradient is 18 mmHg. Aortic valve dimensionless index is 0.5 .    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 Paravalvular regurgitation is present in the prosthetic aortic valve.    Estimated right ventricular systolic pressure from tricuspid regurgitation is moderately elevated (45-55 mmHg). Calculated right ventricular systolic pressure from tricuspid regurgitation is 51 mmHg.          Current Outpatient Medications:     amLODIPine (NORVASC) 10 MG tablet, Take 1 tablet by mouth Daily., Disp: 90 tablet, Rfl: 1    Cholecalciferol 25 MCG (1000 UT) tablet, Take 1 tablet by mouth Daily., Disp: , Rfl:     glipizide (GLUCOTROL) 5 MG tablet, Take 1 tablet by mouth 2 (Two) Times a Day Before Meals., Disp: 180 tablet, Rfl: 1    glucose blood (Accu-Chek Keshia Plus) test strip, USE TO TEST BLOOD SUGAR    TWICE DAILY FOR DIABETES, Disp: 100 each, Rfl: 8    hydrALAZINE (APRESOLINE) 100 MG tablet, Take 1 tablet by mouth 3 (Three) Times a Day., Disp: , Rfl:     metoprolol tartrate (LOPRESSOR) 25 MG tablet, Take 1 tablet by mouth 2 (Two) Times a Day., Disp: 180 tablet, Rfl: 1    terazosin (HYTRIN) 5 MG capsule, TAKE 1 CAPSULE BY MOUTH EVERY DAY AT BEDTIME FOR 90 DAYS, Disp: , Rfl:     vitamin C (ASCORBIC ACID) 250 MG tablet, Take 1 tablet by mouth Daily., Disp: , Rfl:     vitamin D (ERGOCALCIFEROL) 1.25 MG (78864 UT) capsule capsule, TAKE 1 CAPSULE BY MOUTH ONCE A WEEK FOR 90 DAYS, Disp: , Rfl:     Zinc 50 MG tablet, , Disp: , Rfl:     apixaban (ELIQUIS) 5 MG tablet tablet, Take 1 tablet by mouth 2 (Two) Times a Day., Disp: 60 tablet, Rfl: 6    atorvastatin (LIPITOR) 20 MG tablet, Take 1 tablet by mouth Every Night. NEED TO SEE PROVIDER FOR FUTURE REFILLS., Disp: 90 tablet, Rfl: 1    bumetanide (BUMEX) 2 MG tablet, Take 1 tablet by mouth Daily., Disp: 90 tablet, Rfl: 0    clopidogrel (PLAVIX) 75 MG tablet, Take 1 tablet by mouth Daily., Disp: 90 tablet, Rfl: 1     linagliptin (Tradjenta) 5 MG tablet tablet, Take 1 tablet by mouth Daily., Disp: 90 tablet, Rfl: 1    losartan (COZAAR) 50 MG tablet, Take 1.5 tablets by mouth Daily., Disp: 135 tablet, Rfl: 1    metoprolol succinate XL (TOPROL-XL) 25 MG 24 hr tablet, Take 1 tablet by mouth., Disp: , Rfl:    Assessment:                Plan:          ICD-10-CM ICD-9-CM   1. Arteriosclerosis of coronary artery  I25.10 414.00   2. Essential hypertension  I10 401.9   3. Abnormal ECG  R94.31 794.31   4. Hyperlipidemia, unspecified hyperlipidemia type  E78.5 272.4   5. Atrial fibrillation, persistent  I48.19 427.31   6. Chronic anticoagulation  Z79.01 V58.61     1. Arteriosclerosis of coronary artery  No angina pectoris    2. Essential hypertension  Patient understands importance of blood pressure check at home which patient does regularly and the blood pressures are well under control to the level of less than 140/90      3. Abnormal ECG  No chest pain    4. Hyperlipidemia, unspecified hyperlipidemia type  Continue current treatment  5.  Atrial fibrillation      START ELLOQUISE 5 MG PO BID    Pros and cons of this new medication / change medication has been explained to  the patient    Possible side effects has been explained    Associated need of the blood  Work has been explained    Need for the compliance of the medication has been explained      COME BACK IN 3 WKS FOR CV  COUNSELING:    Rock Maciel Jr.Counseling was given to patient for the following topics: diagnostic results, risk factor reductions, impressions, risks and benefits of treatment options and importance of treatment compliance .       SMOKING COUNSELING:        Dictated using Dragon dictation

## 2024-10-04 LAB
AORTIC DIMENSIONLESS INDEX: 0.4 (DI)
ASCENDING AORTA: 2.2 CM
BH CV ECHO MEAS - AO MAX PG: 29.5 MMHG
BH CV ECHO MEAS - AO MEAN PG: 14.2 MMHG
BH CV ECHO MEAS - AO V2 MAX: 271.5 CM/SEC
BH CV ECHO MEAS - AO V2 VTI: 54.5 CM
BH CV ECHO MEAS - AVA(I,D): 1.5 CM2
BH CV ECHO MEAS - EDV(CUBED): 74.8 ML
BH CV ECHO MEAS - EDV(MOD-SP2): 141 ML
BH CV ECHO MEAS - EDV(MOD-SP4): 189 ML
BH CV ECHO MEAS - EF(MOD-BP): 64.4 %
BH CV ECHO MEAS - EF(MOD-SP2): 64.5 %
BH CV ECHO MEAS - EF(MOD-SP4): 61.4 %
BH CV ECHO MEAS - ESV(CUBED): 19.8 ML
BH CV ECHO MEAS - ESV(MOD-SP2): 50 ML
BH CV ECHO MEAS - ESV(MOD-SP4): 73 ML
BH CV ECHO MEAS - FS: 35.8 %
BH CV ECHO MEAS - IVS/LVPW: 0.98 CM
BH CV ECHO MEAS - IVSD: 1.28 CM
BH CV ECHO MEAS - LA A2CS (ATRIAL LENGTH): 6.6 CM
BH CV ECHO MEAS - LA A4C LENGTH: 6.2 CM
BH CV ECHO MEAS - LV DIASTOLIC VOL/BSA (35-75): 73.7 CM2
BH CV ECHO MEAS - LV MASS(C)D: 198.9 GRAMS
BH CV ECHO MEAS - LV MAX PG: 5.1 MMHG
BH CV ECHO MEAS - LV MEAN PG: 2.39 MMHG
BH CV ECHO MEAS - LV SYSTOLIC VOL/BSA (12-30): 28.5 CM2
BH CV ECHO MEAS - LV V1 MAX: 112.4 CM/SEC
BH CV ECHO MEAS - LV V1 VTI: 23.1 CM
BH CV ECHO MEAS - LVIDD: 4.2 CM
BH CV ECHO MEAS - LVIDS: 2.7 CM
BH CV ECHO MEAS - LVOT AREA: 3.6 CM2
BH CV ECHO MEAS - LVOT DIAM: 2.13 CM
BH CV ECHO MEAS - LVPWD: 1.3 CM
BH CV ECHO MEAS - MV DEC SLOPE: 1072 CM/SEC2
BH CV ECHO MEAS - MV E MAX VEL: 138 CM/SEC
BH CV ECHO MEAS - MV MAX PG: 18.5 MMHG
BH CV ECHO MEAS - MV MEAN PG: 6.7 MMHG
BH CV ECHO MEAS - MV P1/2T: 56.4 MSEC
BH CV ECHO MEAS - MV V2 VTI: 42.2 CM
BH CV ECHO MEAS - MVA(P1/2T): 3.9 CM2
BH CV ECHO MEAS - MVA(VTI): 1.95 CM2
BH CV ECHO MEAS - PA ACC TIME: 0.13 SEC
BH CV ECHO MEAS - PA V2 MAX: 109.9 CM/SEC
BH CV ECHO MEAS - QP/QS: 1.1
BH CV ECHO MEAS - RAP SYSTOLE: 8 MMHG
BH CV ECHO MEAS - RV MAX PG: 3.1 MMHG
BH CV ECHO MEAS - RV V1 MAX: 87.8 CM/SEC
BH CV ECHO MEAS - RV V1 VTI: 17.7 CM
BH CV ECHO MEAS - RVOT DIAM: 2.6 CM
BH CV ECHO MEAS - RVSP: 50 MMHG
BH CV ECHO MEAS - SV(LVOT): 82.3 ML
BH CV ECHO MEAS - SV(MOD-SP2): 91 ML
BH CV ECHO MEAS - SV(MOD-SP4): 116 ML
BH CV ECHO MEAS - SV(RVOT): 90.8 ML
BH CV ECHO MEAS - SVI(LVOT): 32.1 ML/M2
BH CV ECHO MEAS - SVI(MOD-SP2): 35.5 ML/M2
BH CV ECHO MEAS - SVI(MOD-SP4): 45.2 ML/M2
BH CV ECHO MEAS - TR MAX PG: 41.9 MMHG
BH CV ECHO MEAS - TR MAX VEL: 323.6 CM/SEC
BH CV XLRA - RV BASE: 5.2 CM
BH CV XLRA - RV LENGTH: 8.8 CM
BH CV XLRA - RV MID: 3.7 CM
LEFT ATRIUM VOLUME INDEX: 53.1 ML/M2

## 2024-10-09 RX ORDER — BUMETANIDE 2 MG/1
2 TABLET ORAL DAILY
Qty: 90 TABLET | Refills: 0 | Status: SHIPPED | OUTPATIENT
Start: 2024-10-09

## 2024-10-09 NOTE — TELEPHONE ENCOUNTER
Rx Refill Note  Requested Prescriptions      No prescriptions requested or ordered in this encounter      Last office visit with prescribing clinician: 5/17/2024   Last telemedicine visit with prescribing clinician: Visit date not found   Next office visit with prescribing clinician: 11/19/2024                         Would you like a call back once the refill request has been completed: [] Yes [] No    If the office needs to give you a call back, can they leave a voicemail: [] Yes [] No    Gracia Bansal MA  10/09/24, 12:05 EDT

## 2024-10-10 DIAGNOSIS — E11.59 TYPE 2 DIABETES MELLITUS WITH OTHER CIRCULATORY COMPLICATION, WITHOUT LONG-TERM CURRENT USE OF INSULIN: Primary | ICD-10-CM

## 2024-10-10 RX ORDER — ATORVASTATIN CALCIUM 20 MG/1
20 TABLET, FILM COATED ORAL NIGHTLY
Qty: 90 TABLET | Refills: 1 | Status: SHIPPED | OUTPATIENT
Start: 2024-10-10

## 2024-10-11 DIAGNOSIS — I50.32 CHRONIC DIASTOLIC (CONGESTIVE) HEART FAILURE: Chronic | ICD-10-CM

## 2024-10-11 DIAGNOSIS — I10 ESSENTIAL HYPERTENSION: Chronic | ICD-10-CM

## 2024-10-11 DIAGNOSIS — N18.32 STAGE 3B CHRONIC KIDNEY DISEASE: Chronic | ICD-10-CM

## 2024-10-11 RX ORDER — LOSARTAN POTASSIUM 50 MG/1
75 TABLET ORAL DAILY
Qty: 135 TABLET | Refills: 1 | Status: SHIPPED | OUTPATIENT
Start: 2024-10-11

## 2024-10-16 DIAGNOSIS — Z95.3 H/O AORTIC VALVE REPLACEMENT WITH PORCINE VALVE: Primary | ICD-10-CM

## 2024-10-16 RX ORDER — CLOPIDOGREL BISULFATE 75 MG/1
75 TABLET ORAL DAILY
Qty: 90 TABLET | Refills: 1 | Status: SHIPPED | OUTPATIENT
Start: 2024-10-16

## 2024-10-18 ENCOUNTER — DOCUMENTATION (OUTPATIENT)
Dept: CARDIOLOGY | Facility: CLINIC | Age: 79
End: 2024-10-18
Payer: MEDICARE

## 2024-10-18 PROBLEM — Z79.01 CHRONIC ANTICOAGULATION: Status: ACTIVE | Noted: 2024-10-18

## 2024-10-18 PROBLEM — I48.19 ATRIAL FIBRILLATION, PERSISTENT: Status: ACTIVE | Noted: 2024-10-18

## 2024-10-18 NOTE — PROGRESS NOTES
Patient called the  service this evening complaining of bilateral lower extremity edema worse over the past 2 to 3 days.  Shortness of breath is present but overall stable since his recent diagnosis of atrial fibrillation.  There are plans for possible cardioversion after visit on 10/28/2024.  Most recent creatinine is from April 2024 and was 1.99 he does follow with nephrology.  I told him that he can take an extra dose of bumetanide 2 mg over the weekend and make sure to call nephrology Monday morning for their recommendations.  He will call the office Monday morning and give an update on edema at that time.  If edema worsens or shortness of breath worsens over the weekend he will go to ER.

## 2024-10-28 ENCOUNTER — TELEPHONE (OUTPATIENT)
Dept: FAMILY MEDICINE CLINIC | Facility: CLINIC | Age: 79
End: 2024-10-28
Payer: MEDICARE

## 2024-10-28 ENCOUNTER — OFFICE VISIT (OUTPATIENT)
Dept: CARDIOLOGY | Facility: CLINIC | Age: 79
End: 2024-10-28
Payer: MEDICARE

## 2024-10-28 VITALS
SYSTOLIC BLOOD PRESSURE: 100 MMHG | DIASTOLIC BLOOD PRESSURE: 46 MMHG | HEART RATE: 89 BPM | HEIGHT: 73 IN | BODY MASS INDEX: 39.85 KG/M2

## 2024-10-28 DIAGNOSIS — I48.19 ATRIAL FIBRILLATION, PERSISTENT: Primary | ICD-10-CM

## 2024-10-28 DIAGNOSIS — I10 ESSENTIAL HYPERTENSION: ICD-10-CM

## 2024-10-28 DIAGNOSIS — Z79.01 CHRONIC ANTICOAGULATION: ICD-10-CM

## 2024-10-28 DIAGNOSIS — E78.5 HYPERLIPIDEMIA, UNSPECIFIED HYPERLIPIDEMIA TYPE: ICD-10-CM

## 2024-10-28 NOTE — PROGRESS NOTES
Follow up about cardio version   Subjective:        Rock Maciel Jr. is a 79 y.o. male who here for follow up    CC  Atrial fibrillation  HPI  79-year-old male with atrial fibrillation and anticoagulation continues to complains of significant shortness of breath     Problems Addressed this Visit          Cardiac and Vasculature    Essential hypertension    Hyperlipidemia    Atrial fibrillation, persistent - Primary    Relevant Orders    Cardioversion External in Cardiology Department    CBC & Differential    Basic Metabolic Panel    aPTT    Protime-INR       Coag and Thromboembolic    Chronic anticoagulation     Diagnoses         Codes Comments    Atrial fibrillation, persistent    -  Primary ICD-10-CM: I48.19  ICD-9-CM: 427.31     Hyperlipidemia, unspecified hyperlipidemia type     ICD-10-CM: E78.5  ICD-9-CM: 272.4     Essential hypertension     ICD-10-CM: I10  ICD-9-CM: 401.9     Chronic anticoagulation     ICD-10-CM: Z79.01  ICD-9-CM: V58.61           .    The following portions of the patient's history were reviewed and updated as appropriate: allergies, current medications, past family history, past medical history, past social history, past surgical history and problem list.    Past Medical History:   Diagnosis Date    Allergic rhinitis     Bronchitis     Coronary artery disease     Diabetes mellitus 2001    DM (diabetes mellitus)     Encounter for annual health examination 05/06/2014    Annual Health Assessment    Gout     Hiatal hernia     History of MRSA infection     RIGHT FOOT 2010    Hyperlipidemia     Hypertension     Murmur     Pneumothorax on right     Sleep apnea     pt wears CPAP at night    Wellness examination 06/24/2015    Annual Wellness Visit     reports that he has never smoked. He has never been exposed to tobacco smoke. He has never used smokeless tobacco. He reports current alcohol use. He reports that he does not use drugs.   Family History   Problem Relation Age of Onset     "Hypertension Father        Review of Systems  Constitutional: No wt loss, fever, fatigue  Gastrointestinal: No nausea, abdominal pain  Behavioral/Psych: No insomnia or anxiety   Cardiovascular shortness of breath  Objective:       Physical Exam  /46   Pulse 89   Ht 185.4 cm (72.99\")   BMI 39.85 kg/m²   General appearance: No acute changes   Neck: Trachea midline; NECK, supple, no thyromegaly or lymphadenopathy   Lungs: Normal size and shape, normal breath sounds, equal distribution of air, no rales and rhonchi   CV: S1-S2 irregular, no murmurs, no rub, no gallop   Abdomen: Soft, nontender; no masses , no abnormal abdominal sounds   Extremities: No deformity , normal color , no peripheral edema   Skin: Normal temperature, turgor and texture; no rash, ulcers          Procedures      Echocardiogram:    Results for orders placed during the hospital encounter of 10/03/24    Adult Transthoracic Echo Complete W/ Cont if Necessary Per Protocol    Interpretation Summary    Left ventricular ejection fraction appears to be 61 - 65%.    Left ventricular diastolic function was indeterminate.    The left atrial cavity is moderately dilated.    Left atrial volume is moderately increased.    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    Estimated right ventricular systolic pressure from tricuspid regurgitation is moderately elevated (45-55 mmHg).          Current Outpatient Medications:     apixaban (ELIQUIS) 5 MG tablet tablet, Take 1 tablet by mouth 2 (Two) Times a Day., Disp: 60 tablet, Rfl: 6    atorvastatin (LIPITOR) 20 MG tablet, Take 1 tablet by mouth Every Night. NEED TO SEE PROVIDER FOR FUTURE REFILLS., Disp: 90 tablet, Rfl: 1    bumetanide (BUMEX) 2 MG tablet, Take 1 tablet by mouth Daily., Disp: 90 tablet, Rfl: 0    Cholecalciferol 25 MCG (1000 UT) tablet, Take 1 tablet by mouth Daily., Disp: , Rfl:     clopidogrel (PLAVIX) 75 MG tablet, Take 1 tablet by mouth Daily., Disp: 90 tablet, Rfl: 1    hydrALAZINE " (APRESOLINE) 100 MG tablet, Take 1 tablet by mouth 3 (Three) Times a Day., Disp: , Rfl:     metoprolol tartrate (LOPRESSOR) 25 MG tablet, Take 1 tablet by mouth 2 (Two) Times a Day., Disp: 180 tablet, Rfl: 1    terazosin (HYTRIN) 5 MG capsule, TAKE 1 CAPSULE BY MOUTH EVERY DAY AT BEDTIME FOR 90 DAYS, Disp: , Rfl:     vitamin C (ASCORBIC ACID) 250 MG tablet, Take 1 tablet by mouth Daily., Disp: , Rfl:     vitamin D (ERGOCALCIFEROL) 1.25 MG (74962 UT) capsule capsule, TAKE 1 CAPSULE BY MOUTH ONCE A WEEK FOR 90 DAYS, Disp: , Rfl:     Zinc 50 MG tablet, , Disp: , Rfl:     amLODIPine (NORVASC) 10 MG tablet, TAKE 1 TABLET BY MOUTH EVERY DAY, Disp: 90 tablet, Rfl: 1    glipizide (GLUCOTROL) 5 MG tablet, TAKE 1 TABLET BY MOUTH 2 TIMES A DAY BEFORE MEALS., Disp: 180 tablet, Rfl: 1    glucose blood (Accu-Chek Keshia Plus) test strip, USE TO TEST BLOOD SUGAR    TWICE DAILY FOR DIABETES, Disp: 100 each, Rfl: 8    linagliptin (Tradjenta) 5 MG tablet tablet, Take 1 tablet by mouth Daily., Disp: 90 tablet, Rfl: 1    losartan (COZAAR) 50 MG tablet, Take 1.5 tablets by mouth Daily. (Patient not taking: Reported on 10/28/2024), Disp: 135 tablet, Rfl: 1    metoprolol succinate XL (TOPROL-XL) 25 MG 24 hr tablet, Take 1 tablet by mouth. (Patient not taking: Reported on 10/28/2024), Disp: , Rfl:    Assessment:                Plan:          ICD-10-CM ICD-9-CM   1. Atrial fibrillation, persistent  I48.19 427.31   2. Hyperlipidemia, unspecified hyperlipidemia type  E78.5 272.4   3. Essential hypertension  I10 401.9   4. Chronic anticoagulation  Z79.01 V58.61     1. Atrial fibrillation, persistent  Pt swears , taking anticoagulation on regular basis without missing the dose and has been well anticoagulated    Procedure , risks and options of cardioversion has been explained. Rock Maciel Jr. understands well and agrees.   - Cardioversion External in Cardiology Department; Future  - CBC & Differential; Future  - Basic Metabolic Panel  -  aPTT; Future  - Protime-INR; Future    2. Hyperlipidemia, unspecified hyperlipidemia type  Continue current treat    3. Essential hypertension  Continue current treatment    4. Chronic anticoagulation  Tolerating well      Still sig sob, says worse sob     Afib    Needs admit , start iv amiodarone and cv    Pt swears , taking anticoagulation on regular basis without missing the dose and has been well anticoagulated    Procedure , risks and options of cardioversion has been explained. Rock Maciel Jr. understands well and agrees.   COUNSELING:    Rock Maciel Jr.Counseling was given to patient for the following topics: diagnostic results, risk factor reductions, impressions, risks and benefits of treatment options and importance of treatment compliance .       SMOKING COUNSELING:        Dictated using Dragon dictation

## 2024-10-28 NOTE — TELEPHONE ENCOUNTER
Hub to relay:  Called in regards to [employer]  Please forward call to office so I can ask if patient is seeing a podiatrist or if we need to put a referral in so patient can receive his diabetic shoes

## 2024-10-29 RX ORDER — AMLODIPINE BESYLATE 10 MG/1
10 TABLET ORAL DAILY
Qty: 90 TABLET | Refills: 1 | Status: SHIPPED | OUTPATIENT
Start: 2024-10-29

## 2024-10-29 RX ORDER — GLIPIZIDE 5 MG/1
5 TABLET ORAL
Qty: 180 TABLET | Refills: 1 | Status: SHIPPED | OUTPATIENT
Start: 2024-10-29

## 2024-10-30 ENCOUNTER — TELEPHONE (OUTPATIENT)
Dept: FAMILY MEDICINE CLINIC | Facility: CLINIC | Age: 79
End: 2024-10-30

## 2024-10-30 ENCOUNTER — TELEPHONE (OUTPATIENT)
Dept: CARDIOLOGY | Facility: CLINIC | Age: 79
End: 2024-10-30

## 2024-10-30 NOTE — TELEPHONE ENCOUNTER
Caller: Rock Maciel Jr.    Relationship: Self    Best call back number:  [phone]      PATIENT IS REQUESTING A CALL BACK TO DISCUSS WHAT HE IS TO DO FOR 11.7 PROCEDURE AS IT SAYS 7AM ON HIS PAPER TO BE AT THE HOSPITAL BUT THEN IT SAYS TO CALL BED BOOKING AT 8:30

## 2024-10-30 NOTE — TELEPHONE ENCOUNTER
Caller: Rock Maciel Jr.    Relationship: Self    Best call back number:     [phone]       What form or medical record are you requesting: PAPER WORK FROM [employer]     Who is requesting this form or medical record from you: THE PATIENT AND THE COMPANY     Timeframe paperwork needed: ASAP    Additional notes:     THIS PAPERWORK WAS FAXED TO THE OFFICE AND THE PATIENT NEEDS IT TO BE COMPLETED AND RETURNED AS SOON AS POSSIBLE.  THIS IS FOR HIS DIABETIC SHOES.    ANY QUESTIONS OR CONCERNS PLEASE CONTACT PATIENT

## 2024-10-31 NOTE — TELEPHONE ENCOUNTER
Please request Diabetic Shoe paperwork from [employer] so I can send this to my collaborative MD for signature, as Nurse Practitioners are not authorized to sign for Diabetic Shoe order.  Thanks.

## 2024-11-01 ENCOUNTER — TELEPHONE (OUTPATIENT)
Dept: FAMILY MEDICINE CLINIC | Facility: CLINIC | Age: 79
End: 2024-11-01
Payer: MEDICARE

## 2024-11-01 NOTE — TELEPHONE ENCOUNTER
Called [employer] and requested another copy of prescription  stated that she would send a copy immediately

## 2024-11-01 NOTE — TELEPHONE ENCOUNTER
Barton and left a voicemail in regards to the [employer] paperwork. Just letting patient know that I reached out to [employer] and requested another copy of the prescription as soon as we receive it I will give to jaylene for a signature and will fax back asap

## 2024-11-01 NOTE — TELEPHONE ENCOUNTER
Called [employer] and requested the paperwork back in regard to pts diabetic shoes  said that she was going to send the paperwork back over today.

## 2024-11-06 NOTE — TELEPHONE ENCOUNTER
Called and scheduled patient an appintment to come in 11/11 so that the [employer] paperwork can be filled out and faxed back

## 2024-11-07 ENCOUNTER — APPOINTMENT (OUTPATIENT)
Dept: GENERAL RADIOLOGY | Facility: HOSPITAL | Age: 79
DRG: 309 | End: 2024-11-07
Payer: MEDICARE

## 2024-11-07 ENCOUNTER — HOSPITAL ENCOUNTER (INPATIENT)
Facility: HOSPITAL | Age: 79
LOS: 7 days | Discharge: HOME | DRG: 309 | End: 2024-11-15
Attending: INTERNAL MEDICINE | Admitting: INTERNAL MEDICINE
Payer: MEDICARE

## 2024-11-07 DIAGNOSIS — N18.30 STAGE 3 CHRONIC KIDNEY DISEASE, UNSPECIFIED WHETHER STAGE 3A OR 3B CKD: ICD-10-CM

## 2024-11-07 DIAGNOSIS — I50.32 CHRONIC DIASTOLIC (CONGESTIVE) HEART FAILURE: ICD-10-CM

## 2024-11-07 DIAGNOSIS — I48.19 ATRIAL FIBRILLATION, PERSISTENT: ICD-10-CM

## 2024-11-07 DIAGNOSIS — R09.02 HYPOXIA: Primary | ICD-10-CM

## 2024-11-07 LAB
ALBUMIN SERPL-MCNC: 3.5 G/DL (ref 3.5–5.2)
ALBUMIN/GLOB SERPL: 0.9 G/DL
ALP SERPL-CCNC: 95 U/L (ref 39–117)
ALT SERPL W P-5'-P-CCNC: 8 U/L (ref 1–41)
ANION GAP SERPL CALCULATED.3IONS-SCNC: 10.8 MMOL/L (ref 5–15)
APTT PPP: 35.2 SECONDS (ref 22.7–35.4)
AST SERPL-CCNC: 10 U/L (ref 1–40)
BASOPHILS # BLD AUTO: 0.01 10*3/MM3 (ref 0–0.2)
BASOPHILS NFR BLD AUTO: 0.2 % (ref 0–1.5)
BILIRUB SERPL-MCNC: 0.6 MG/DL (ref 0–1.2)
BUN SERPL-MCNC: 68 MG/DL (ref 8–23)
BUN/CREAT SERPL: 21.9 (ref 7–25)
CALCIUM SPEC-SCNC: 9.1 MG/DL (ref 8.6–10.5)
CHLORIDE SERPL-SCNC: 103 MMOL/L (ref 98–107)
CO2 SERPL-SCNC: 25.2 MMOL/L (ref 22–29)
CREAT SERPL-MCNC: 3.1 MG/DL (ref 0.76–1.27)
DEPRECATED RDW RBC AUTO: 49.5 FL (ref 37–54)
EGFRCR SERPLBLD CKD-EPI 2021: 19.6 ML/MIN/1.73
EOSINOPHIL # BLD AUTO: 0.08 10*3/MM3 (ref 0–0.4)
EOSINOPHIL NFR BLD AUTO: 1.3 % (ref 0.3–6.2)
ERYTHROCYTE [DISTWIDTH] IN BLOOD BY AUTOMATED COUNT: 14.3 % (ref 12.3–15.4)
GLOBULIN UR ELPH-MCNC: 3.7 GM/DL
GLUCOSE BLDC GLUCOMTR-MCNC: 180 MG/DL (ref 70–130)
GLUCOSE BLDC GLUCOMTR-MCNC: 210 MG/DL (ref 70–130)
GLUCOSE SERPL-MCNC: 250 MG/DL (ref 65–99)
HCT VFR BLD AUTO: 30.2 % (ref 37.5–51)
HGB BLD-MCNC: 9.2 G/DL (ref 13–17.7)
IMM GRANULOCYTES # BLD AUTO: 0.02 10*3/MM3 (ref 0–0.05)
IMM GRANULOCYTES NFR BLD AUTO: 0.3 % (ref 0–0.5)
INR PPP: 1.72 (ref 0.9–1.1)
LYMPHOCYTES # BLD AUTO: 0.57 10*3/MM3 (ref 0.7–3.1)
LYMPHOCYTES NFR BLD AUTO: 9.3 % (ref 19.6–45.3)
MCH RBC QN AUTO: 29.2 PG (ref 26.6–33)
MCHC RBC AUTO-ENTMCNC: 30.5 G/DL (ref 31.5–35.7)
MCV RBC AUTO: 95.9 FL (ref 79–97)
MONOCYTES # BLD AUTO: 0.29 10*3/MM3 (ref 0.1–0.9)
MONOCYTES NFR BLD AUTO: 4.7 % (ref 5–12)
NEUTROPHILS NFR BLD AUTO: 5.19 10*3/MM3 (ref 1.7–7)
NEUTROPHILS NFR BLD AUTO: 84.2 % (ref 42.7–76)
NRBC BLD AUTO-RTO: 0 /100 WBC (ref 0–0.2)
NT-PROBNP SERPL-MCNC: 3242 PG/ML (ref 0–1800)
PLATELET # BLD AUTO: 200 10*3/MM3 (ref 140–450)
PMV BLD AUTO: 9.9 FL (ref 6–12)
POTASSIUM SERPL-SCNC: 4.9 MMOL/L (ref 3.5–5.2)
PROT SERPL-MCNC: 7.2 G/DL (ref 6–8.5)
PROTHROMBIN TIME: 20.4 SECONDS (ref 11.7–14.2)
QT INTERVAL: 396 MS
QTC INTERVAL: 485 MS
RBC # BLD AUTO: 3.15 10*6/MM3 (ref 4.14–5.8)
SODIUM SERPL-SCNC: 139 MMOL/L (ref 136–145)
WBC NRBC COR # BLD AUTO: 6.16 10*3/MM3 (ref 3.4–10.8)

## 2024-11-07 PROCEDURE — 85610 PROTHROMBIN TIME: CPT

## 2024-11-07 PROCEDURE — 85730 THROMBOPLASTIN TIME PARTIAL: CPT

## 2024-11-07 PROCEDURE — 85025 COMPLETE CBC W/AUTO DIFF WBC: CPT

## 2024-11-07 PROCEDURE — 71046 X-RAY EXAM CHEST 2 VIEWS: CPT

## 2024-11-07 PROCEDURE — 80053 COMPREHEN METABOLIC PANEL: CPT

## 2024-11-07 PROCEDURE — 25010000002 AMIODARONE IN DEXTROSE 5% 360-4.14 MG/200ML-% SOLUTION

## 2024-11-07 PROCEDURE — 93005 ELECTROCARDIOGRAM TRACING: CPT

## 2024-11-07 PROCEDURE — 93010 ELECTROCARDIOGRAM REPORT: CPT | Performed by: INTERNAL MEDICINE

## 2024-11-07 PROCEDURE — G0378 HOSPITAL OBSERVATION PER HR: HCPCS

## 2024-11-07 PROCEDURE — 25010000002 BUMETANIDE PER 0.5 MG

## 2024-11-07 PROCEDURE — 99222 1ST HOSP IP/OBS MODERATE 55: CPT | Performed by: INTERNAL MEDICINE

## 2024-11-07 PROCEDURE — 25010000002 AMIODARONE IN DEXTROSE 5% 150-4.21 MG/100ML-% SOLUTION

## 2024-11-07 PROCEDURE — 82948 REAGENT STRIP/BLOOD GLUCOSE: CPT

## 2024-11-07 PROCEDURE — 83880 ASSAY OF NATRIURETIC PEPTIDE: CPT

## 2024-11-07 RX ORDER — CLOPIDOGREL BISULFATE 75 MG/1
75 TABLET ORAL DAILY
Status: DISCONTINUED | OUTPATIENT
Start: 2024-11-07 | End: 2024-11-15 | Stop reason: HOSPADM

## 2024-11-07 RX ORDER — TERAZOSIN 5 MG/1
5 CAPSULE ORAL NIGHTLY
Status: DISCONTINUED | OUTPATIENT
Start: 2024-11-07 | End: 2024-11-15 | Stop reason: HOSPADM

## 2024-11-07 RX ORDER — GLIPIZIDE 5 MG/1
5 TABLET ORAL
Status: DISCONTINUED | OUTPATIENT
Start: 2024-11-07 | End: 2024-11-15 | Stop reason: HOSPADM

## 2024-11-07 RX ORDER — METOPROLOL SUCCINATE 25 MG/1
25 TABLET, EXTENDED RELEASE ORAL
Status: DISCONTINUED | OUTPATIENT
Start: 2024-11-07 | End: 2024-11-15 | Stop reason: HOSPADM

## 2024-11-07 RX ORDER — BUMETANIDE 2 MG/1
2 TABLET ORAL DAILY
Status: DISCONTINUED | OUTPATIENT
Start: 2024-11-08 | End: 2024-11-07

## 2024-11-07 RX ORDER — SODIUM CHLORIDE 9 MG/ML
40 INJECTION, SOLUTION INTRAVENOUS AS NEEDED
Status: DISCONTINUED | OUTPATIENT
Start: 2024-11-07 | End: 2024-11-15 | Stop reason: HOSPADM

## 2024-11-07 RX ORDER — SODIUM CHLORIDE 0.9 % (FLUSH) 0.9 %
10 SYRINGE (ML) INJECTION EVERY 12 HOURS SCHEDULED
Status: DISCONTINUED | OUTPATIENT
Start: 2024-11-07 | End: 2024-11-15 | Stop reason: HOSPADM

## 2024-11-07 RX ORDER — SODIUM CHLORIDE 0.9 % (FLUSH) 0.9 %
10 SYRINGE (ML) INJECTION AS NEEDED
Status: DISCONTINUED | OUTPATIENT
Start: 2024-11-07 | End: 2024-11-15 | Stop reason: HOSPADM

## 2024-11-07 RX ORDER — AMLODIPINE BESYLATE 10 MG/1
10 TABLET ORAL DAILY
Status: DISCONTINUED | OUTPATIENT
Start: 2024-11-08 | End: 2024-11-10

## 2024-11-07 RX ORDER — BUMETANIDE 0.25 MG/ML
2 INJECTION, SOLUTION INTRAMUSCULAR; INTRAVENOUS EVERY 12 HOURS
Status: COMPLETED | OUTPATIENT
Start: 2024-11-07 | End: 2024-11-08

## 2024-11-07 RX ORDER — NITROGLYCERIN 0.4 MG/1
0.4 TABLET SUBLINGUAL
Status: DISCONTINUED | OUTPATIENT
Start: 2024-11-07 | End: 2024-11-15 | Stop reason: HOSPADM

## 2024-11-07 RX ORDER — BUMETANIDE 0.25 MG/ML
2 INJECTION, SOLUTION INTRAMUSCULAR; INTRAVENOUS EVERY 12 HOURS
Status: DISCONTINUED | OUTPATIENT
Start: 2024-11-07 | End: 2024-11-07

## 2024-11-07 RX ORDER — ATORVASTATIN CALCIUM 20 MG/1
20 TABLET, FILM COATED ORAL NIGHTLY
Status: DISCONTINUED | OUTPATIENT
Start: 2024-11-07 | End: 2024-11-15 | Stop reason: HOSPADM

## 2024-11-07 RX ORDER — HYDRALAZINE HYDROCHLORIDE 50 MG/1
100 TABLET, FILM COATED ORAL 3 TIMES DAILY
Status: DISCONTINUED | OUTPATIENT
Start: 2024-11-07 | End: 2024-11-15

## 2024-11-07 RX ADMIN — CLOPIDOGREL BISULFATE 75 MG: 75 TABLET ORAL at 13:41

## 2024-11-07 RX ADMIN — ATORVASTATIN CALCIUM 20 MG: 20 TABLET, FILM COATED ORAL at 21:04

## 2024-11-07 RX ADMIN — GLIPIZIDE 5 MG: 5 TABLET ORAL at 17:19

## 2024-11-07 RX ADMIN — AMIODARONE HYDROCHLORIDE 1 MG/MIN: 1.8 INJECTION, SOLUTION INTRAVENOUS at 13:55

## 2024-11-07 RX ADMIN — APIXABAN 5 MG: 5 TABLET, FILM COATED ORAL at 21:04

## 2024-11-07 RX ADMIN — Medication 10 ML: at 13:39

## 2024-11-07 RX ADMIN — AMIODARONE HYDROCHLORIDE 150 MG: 1.5 INJECTION, SOLUTION INTRAVENOUS at 13:37

## 2024-11-07 RX ADMIN — AMIODARONE HYDROCHLORIDE 0.5 MG/MIN: 1.8 INJECTION, SOLUTION INTRAVENOUS at 19:36

## 2024-11-07 RX ADMIN — LINAGLIPTIN 5 MG: 5 TABLET, FILM COATED ORAL at 13:39

## 2024-11-07 RX ADMIN — BUMETANIDE 2 MG: 0.25 INJECTION INTRAMUSCULAR; INTRAVENOUS at 16:26

## 2024-11-07 NOTE — CASE MANAGEMENT/SOCIAL WORK
Discharge Planning Assessment  [facility]     Patient Name: Rock Maciel Jr.  MRN: [id number]  Today's Date: 11/7/2024    Admit Date: 11/7/2024    Plan: Home, friend to transport   Discharge Needs Assessment       Row Name 11/07/24 1254       Living Environment    People in Home alone    Current Living Arrangements home    Potentially Unsafe Housing Conditions none    In the past 12 months has the electric, gas, oil, or water company threatened to shut off services in your home? No    Primary Care Provided by self    Provides Primary Care For no one    Family Caregiver if Needed none;other (see comments)  daughter lives in another state    Quality of Family Relationships unable to assess    Able to Return to Prior Arrangements yes       Resource/Environmental Concerns    Resource/Environmental Concerns none    Transportation Concerns none       Transportation Needs    In the past 12 months, has lack of transportation kept you from medical appointments or from getting medications? no    In the past 12 months, has lack of transportation kept you from meetings, work, or from getting things needed for daily living? No       Food Insecurity    Within the past 12 months, you worried that your food would run out before you got the money to buy more. Never true    Within the past 12 months, the food you bought just didn't last and you didn't have money to get more. Never true       Transition Planning    Patient/Family Anticipates Transition to home    Patient/Family Anticipated Services at Transition none    Transportation Anticipated family or friend will provide       Discharge Needs Assessment    Readmission Within the Last 30 Days no previous admission in last 30 days    Equipment Currently Used at Home glucometer;miles rojo    Concerns to be Addressed no discharge needs identified;denies needs/concerns at this time    Do you want help finding or keeping work or a job? I do not need or want help    Do you  want help with school or training? For example, starting or completing job training or getting a high school diploma, GED or equivalent No    Anticipated Changes Related to Illness none    Equipment Needed After Discharge none                   Discharge Plan       Row Name 11/07/24 1646       Plan    Plan Home, friend to transport    Plan Comments Met with pt at bedside. Introduced self and explained role of . Face sheet verified, PCP is Denise Dickson. Pt denies any difficulty paying for medications, and he obtains his medications from [facility]. Pt lives alone, stated that his daughter lives out of state, but his neighbor, Debbie, could assist with any care needs that may arise. Pt is independent in ADLs, and he uses a walker for mobility. He stated that his glucometer is in good working condition. Pt has had [facility] in the past, and he has been to [place] and [place] in the past. Pt stated that he does not anticipate requiring any of these services, that he hopes to go home at discharge. Upon discharge, he stated that a friend would transport him home. Explained that CCP would follow to assess for discharge needs.                  Continued Care and Services - Admitted Since 11/7/2024    No active coordination exists for this encounter.       Expected Discharge Date and Time       Expected Discharge Date Expected Discharge Time    Nov 9, 2024            Demographic Summary    No documentation.                  Functional Status    No documentation.                  Psychosocial    No documentation.                  Abuse/Neglect    No documentation.                  Legal    No documentation.                  Substance Abuse    No documentation.                  Patient Forms    No documentation.                     Azalia Hickey RN

## 2024-11-07 NOTE — PLAN OF CARE
Goal Outcome Evaluation:   Diuretic in Use: none  Response to Diuretics (Output greater than intake):   Daily Weight (up or down): new aDMIT  O2 Requirements: room air; cpap at night  Functional Status (Activity level, tolerance and respiratory symptoms): up with walker + standby  Discharge Plans:  pending rhythm intervention

## 2024-11-07 NOTE — H&P
[facility]  History & Physical Note                                                                                    Patient Identification:  Rock Maciel Jr.:   79 y.o.  male  1945  [id number]            No chief complaint on file.      Date of Admission: 11/7/24    Admitting Physician: Dr Toney    Reason for Admission:Persistent atrial fibrillation [I48.19], No chief complaint on file.      History of Present Illness:     This is a 79 year old male who is well known with our service with coronary artery disease s/p CABG x2 2019, AVR 2019, paroxysmal atrial fibrillation, hypertension, carotid artery disease s/p cea, DM, hyperlipidemia. He was seen in office on 10/3/24 found to be in afib, he was started on eliquis. He followed up in office on 10/28 remained in afib and was symptomatic while in afib with shortness of breath. He is admitted for amiodarone initiation and elective cardioversion.     Echo 10/3/24 EF 61-65% indeterminate LV diastolic function. Left atrial cavity is moderately dilated, left atrial volume     Stress test 11/8/2023 small sized infarct located in lateral wall with no significant ischemia noted.    Cardiac catheterization 2018 normal left main, minimum diffuse left anterior descending irregularity with midportion 40 to 50% stenosis.  Circumflex artery has OM ostial 70% distal circumflex 60% stenosis.  RCA dominant mid and distal portion 70% stenosis.  Severe AV stenosis.    Cardiac Risk Factors: Coronary disease, hypertension, hyperlipidemia, diabetes mellitus    Past Medical History:  Past Medical History:   Diagnosis Date    Allergic rhinitis     Bronchitis     Coronary artery disease     Diabetes mellitus 2001    DM (diabetes mellitus)     Encounter for annual health examination 05/06/2014    Annual Health Assessment    Gout     Hiatal hernia     History of MRSA infection     RIGHT FOOT 2010    Hyperlipidemia     Hypertension     Murmur     Pneumothorax on  right     Sleep apnea     pt wears CPAP at night    Wellness examination 06/24/2015    Annual Wellness Visit    at least 3 stable    Past Surgical History:  Past Surgical History:   Procedure Laterality Date    ARTERY SURGERY Bilateral     carotid    CARDIAC CATHETERIZATION N/A 7/20/2018    Procedure: Left Heart Cath;  Surgeon: Jo Toney MD;  Location: [facility] CATH INVASIVE LOCATION;  Service: Cardiovascular    CARDIAC CATHETERIZATION N/A 7/20/2018    Procedure: Coronary angiography;  Surgeon: Jo Toney MD;  Location: [facility] CATH INVASIVE LOCATION;  Service: Cardiovascular    CAROTID ENDARTERECTOMY Bilateral     COLONOSCOPY  2008    CORONARY ARTERY BYPASS GRAFT WITH AORTIC VALVE REPAIR/REPLACEMENT N/A 7/23/2018    Procedure: INTRAOPERATIVE ALEX, MIDLINE STERNOTOMY, CORONARY ARTERY BYPASS GRAFTING X 3 USING ENDOSCOPICALLY HARVESTED LEFT GREATER SAPHENOUS VEIN,  AORTIC VALVE REPLACEMENT USING 25MM LOPEZ II ULTRA PORCINE VALVE, PRP;  Surgeon: Phong Posey MD;  Location: [facility];  Service: Cardiothoracic    FOOT SURGERY Right 2010    5th digit removal    FOOT SURGERY Left 2011    1 digit removed    INCISION AND DRAINAGE LEG Right 3/28/2020    Procedure: DEBRIDMENT OF RIGHT CALF;  Surgeon: Ross Pineda MD;  Location: [facility] OR;  Service: Vascular;  Laterality: Right;    QUADRICEPS TENDON REPAIR Left 5/14/2019    Procedure: Left QUADRICEPS TENDON REPAIR;  Surgeon: Camilo Hunter MD;  Location: [facility] OR;  Service: Orthopedics    THORACENTESIS Right 11/21/2016    THORACOSCOPY Right 5/8/2017    Procedure: BRONCHOSCOPY, RIGHT VAT,  TOTAL DECORTICATION RIGHT LUNG, PLEURAL BX, PLACEMENT SUBPLEURAL PAIN CAATHETERS X2;  Surgeon: Donald Orlando III, MD;  Location: [facility] OR;  Service:     TONSILECTOMY, ADENOIDECTOMY, BILATERAL MYRINGOTOMY AND TUBES          Social History:   Social History     Tobacco Use    Smoking status: Never     Passive exposure: Never     Smokeless tobacco: Never   Substance Use Topics    Alcohol use: Yes     Comment: either one glass wine or one glass liquor per  day        Family History:  Family History   Problem Relation Age of Onset    Hypertension Father           Allergies:  Allergies   Allergen Reactions    Ceclor [Cefaclor] Rash     Rash in his 50s; he tolerated piperacillin-tazobactam in March 2020       Home Meds:  No current facility-administered medications on file prior to encounter.     Current Outpatient Medications on File Prior to Encounter   Medication Sig Dispense Refill    apixaban (ELIQUIS) 5 MG tablet tablet Take 1 tablet by mouth 2 (Two) Times a Day. 60 tablet 6    atorvastatin (LIPITOR) 20 MG tablet Take 1 tablet by mouth Every Night. NEED TO SEE PROVIDER FOR FUTURE REFILLS. 90 tablet 1    bumetanide (BUMEX) 2 MG tablet Take 1 tablet by mouth Daily. 90 tablet 0    Cholecalciferol 25 MCG (1000 UT) tablet Take 1 tablet by mouth Daily.      clopidogrel (PLAVIX) 75 MG tablet Take 1 tablet by mouth Daily. 90 tablet 1    glucose blood (Accu-Chek Keshia Plus) test strip USE TO TEST BLOOD SUGAR    TWICE DAILY FOR DIABETES 100 each 8    hydrALAZINE (APRESOLINE) 100 MG tablet Take 1 tablet by mouth 3 (Three) Times a Day.      linagliptin (Tradjenta) 5 MG tablet tablet Take 1 tablet by mouth Daily. 90 tablet 1    losartan (COZAAR) 50 MG tablet Take 1.5 tablets by mouth Daily. 135 tablet 1    metoprolol tartrate (LOPRESSOR) 25 MG tablet Take 1 tablet by mouth 2 (Two) Times a Day. 180 tablet 1    terazosin (HYTRIN) 5 MG capsule TAKE 1 CAPSULE BY MOUTH EVERY DAY AT BEDTIME FOR 90 DAYS      vitamin C (ASCORBIC ACID) 250 MG tablet Take 1 tablet by mouth Daily.      vitamin D (ERGOCALCIFEROL) 1.25 MG (33309 UT) capsule capsule TAKE 1 CAPSULE BY MOUTH ONCE A WEEK FOR 90 DAYS      Zinc 50 MG tablet       metoprolol succinate XL (TOPROL-XL) 25 MG 24 hr tablet Take 1 tablet by mouth.           Scheduled Meds:  amiodarone, 150 mg, Once  sodium  chloride, 10 mL, Q12H            INTAKE AND OUTPUT:  No intake or output data in the 24 hours ending 11/07/24 1137      Review of Systems  Constitutional: No wt loss, fever   Gastrointestinal: No nausea , abdominal pain  Behavioral/Psych: No insomnia or anxiety   Cardiovascular ----positive for shortness of breath. All other systems reviewed and are negative        /51 (BP Location: Left arm, Patient Position: Lying)   Pulse 81   Temp 97.9 °F (36.6 °C) (Oral)   Resp 18   SpO2 92%   General appearance: No acute changes   Neck: Trachea midline; NECK, supple, no thyromegaly or lymphadenopathy   Lungs: Normal size and shape, normal breath sounds, equal distribution of air, no rales and rhonchi   CV: S1-S2 regular, no murmurs, no rub, no gallop   Abdomen: Soft, nontender; no masses , no abnormal abdominal sounds   Extremities: No deformity , normal color , no peripheral edema   Skin: Normal temperature, turgor and texture; no rash, ulcers                          Cardiographics    ECG:     Prior ECG      Telemetry:      ECHO:      Lab Review:              @[email or web address]@              CXR: Pending       Assessment / Plan:  Persistent atrial fibrillation  Coronary artery disease history of CABG times 2 in 2019  Bioprosthetic aortic valve replacement  Hypertension  Hyperlipidemia  Diabetes mellitus    Recommendations:  This is an 79-year-old male who is well-known with our service admitted with symptomatic persistent atrial fibrillation.  He is admitted for initiation of amiodarone and elective cardioversion.  He reports he has not missed any doses of his anticoagulation.  Atrial fibrillation rate is controlled.  Continue Eliquis.  Resume home meds.  Amiodarone IV per protocol.  ECG.  Surveillance labs.  He is pretty short of breath with lower extremity edema.  Checking BNP as well as chest x-ray.  Resume home Bumex.    Addendum: Cr 3.1, will consult nephrology. BNP 3k, chest xr with vascular congestion. Will give  "bumex 2mg every 12 hours.     Mary Beth Steel, APRN  11/7/2024  11:37 EST  79-year-old with chronic atrial fibrillation here with mild congestive heart failure has significant elevated creatinine has been started on IV amiodarone with possible cardioversion in the morning    Pt swears , taking anticoagulation on regular basis without missing the dose and has been well anticoagulated    Procedure , risks and options of cardioversion has been explained. Rock Maciel Jr. understands well and agrees.     Jo Toney MD    EMR Dragon/Transcription:   \"Dictated utilizing Dragon dictation\".     "

## 2024-11-08 ENCOUNTER — APPOINTMENT (OUTPATIENT)
Dept: ULTRASOUND IMAGING | Facility: HOSPITAL | Age: 79
DRG: 309 | End: 2024-11-08
Payer: MEDICARE

## 2024-11-08 ENCOUNTER — APPOINTMENT (OUTPATIENT)
Dept: CARDIOLOGY | Facility: HOSPITAL | Age: 79
DRG: 309 | End: 2024-11-08
Payer: MEDICARE

## 2024-11-08 LAB
ALBUMIN SERPL-MCNC: 3.5 G/DL (ref 3.5–5.2)
ALBUMIN/GLOB SERPL: 1.2 G/DL
ALP SERPL-CCNC: 87 U/L (ref 39–117)
ALT SERPL W P-5'-P-CCNC: 8 U/L (ref 1–41)
ANION GAP SERPL CALCULATED.3IONS-SCNC: 10.4 MMOL/L (ref 5–15)
AST SERPL-CCNC: 7 U/L (ref 1–40)
BACTERIA UR QL AUTO: ABNORMAL /HPF
BILIRUB SERPL-MCNC: 0.5 MG/DL (ref 0–1.2)
BILIRUB UR QL STRIP: NEGATIVE
BUN SERPL-MCNC: 66 MG/DL (ref 8–23)
BUN/CREAT SERPL: 23.7 (ref 7–25)
CALCIUM SPEC-SCNC: 8.5 MG/DL (ref 8.6–10.5)
CHLORIDE SERPL-SCNC: 103 MMOL/L (ref 98–107)
CHOLEST SERPL-MCNC: 81 MG/DL (ref 0–200)
CK SERPL-CCNC: 39 U/L (ref 20–200)
CLARITY UR: CLEAR
CO2 SERPL-SCNC: 25.6 MMOL/L (ref 22–29)
COLOR UR: YELLOW
CREAT SERPL-MCNC: 2.79 MG/DL (ref 0.76–1.27)
CREAT UR-MCNC: 43.5 MG/DL
EGFRCR SERPLBLD CKD-EPI 2021: 22.2 ML/MIN/1.73
EOSINOPHIL SPEC QL MICRO: 0 % EOS/100 CELLS (ref 0–0)
FERRITIN SERPL-MCNC: 60 NG/ML (ref 30–400)
GLOBULIN UR ELPH-MCNC: 2.9 GM/DL
GLUCOSE BLDC GLUCOMTR-MCNC: 141 MG/DL (ref 70–130)
GLUCOSE BLDC GLUCOMTR-MCNC: 144 MG/DL (ref 70–130)
GLUCOSE BLDC GLUCOMTR-MCNC: 156 MG/DL (ref 70–130)
GLUCOSE BLDC GLUCOMTR-MCNC: 172 MG/DL (ref 70–130)
GLUCOSE SERPL-MCNC: 137 MG/DL (ref 65–99)
GLUCOSE UR STRIP-MCNC: NEGATIVE MG/DL
HDLC SERPL-MCNC: 34 MG/DL (ref 40–60)
HGB UR QL STRIP.AUTO: ABNORMAL
HYALINE CASTS UR QL AUTO: ABNORMAL /LPF
IRON 24H UR-MRATE: 27 MCG/DL (ref 59–158)
IRON SATN MFR SERPL: 8 % (ref 20–50)
KETONES UR QL STRIP: NEGATIVE
LDLC SERPL CALC-MCNC: 34 MG/DL (ref 0–100)
LDLC/HDLC SERPL: 1.08 {RATIO}
LEUKOCYTE ESTERASE UR QL STRIP.AUTO: NEGATIVE
MAGNESIUM SERPL-MCNC: 2.2 MG/DL (ref 1.6–2.4)
NITRITE UR QL STRIP: NEGATIVE
PH UR STRIP.AUTO: <=5 [PH] (ref 5–8)
PHOSPHATE SERPL-MCNC: 4.7 MG/DL (ref 2.5–4.5)
POTASSIUM SERPL-SCNC: 4 MMOL/L (ref 3.5–5.2)
PROT ?TM UR-MCNC: 11 MG/DL
PROT SERPL-MCNC: 6.4 G/DL (ref 6–8.5)
PROT UR QL STRIP: ABNORMAL
PROT/CREAT UR: 252.9 MG/G CREA (ref 0–200)
QT INTERVAL: 444 MS
QTC INTERVAL: 510 MS
RBC # UR STRIP: ABNORMAL /HPF
REF LAB TEST METHOD: ABNORMAL
SODIUM SERPL-SCNC: 139 MMOL/L (ref 136–145)
SODIUM UR-SCNC: 88 MMOL/L
SP GR UR STRIP: 1.01 (ref 1–1.03)
SQUAMOUS #/AREA URNS HPF: ABNORMAL /HPF
TIBC SERPL-MCNC: 320 MCG/DL (ref 298–536)
TRANSFERRIN SERPL-MCNC: 215 MG/DL (ref 200–360)
TRIGL SERPL-MCNC: 52 MG/DL (ref 0–150)
UROBILINOGEN UR QL STRIP: ABNORMAL
VLDLC SERPL-MCNC: 13 MG/DL (ref 5–40)
WBC # UR STRIP: ABNORMAL /HPF
WHOLE BLOOD HOLD SPECIMEN: NORMAL

## 2024-11-08 PROCEDURE — 84466 ASSAY OF TRANSFERRIN: CPT

## 2024-11-08 PROCEDURE — 83540 ASSAY OF IRON: CPT

## 2024-11-08 PROCEDURE — 93010 ELECTROCARDIOGRAM REPORT: CPT | Performed by: INTERNAL MEDICINE

## 2024-11-08 PROCEDURE — 84156 ASSAY OF PROTEIN URINE: CPT

## 2024-11-08 PROCEDURE — 82550 ASSAY OF CK (CPK): CPT

## 2024-11-08 PROCEDURE — 25010000002 AMIODARONE IN DEXTROSE 5% 360-4.14 MG/200ML-% SOLUTION

## 2024-11-08 PROCEDURE — 25010000002 BUMETANIDE PER 0.5 MG

## 2024-11-08 PROCEDURE — 25010000002 BUMETANIDE PER 0.5 MG: Performed by: INTERNAL MEDICINE

## 2024-11-08 PROCEDURE — 82570 ASSAY OF URINE CREATININE: CPT

## 2024-11-08 PROCEDURE — 80061 LIPID PANEL: CPT

## 2024-11-08 PROCEDURE — 5A2204Z RESTORATION OF CARDIAC RHYTHM, SINGLE: ICD-10-PCS | Performed by: INTERNAL MEDICINE

## 2024-11-08 PROCEDURE — 81001 URINALYSIS AUTO W/SCOPE: CPT

## 2024-11-08 PROCEDURE — 80053 COMPREHEN METABOLIC PANEL: CPT

## 2024-11-08 PROCEDURE — 82728 ASSAY OF FERRITIN: CPT

## 2024-11-08 PROCEDURE — 93005 ELECTROCARDIOGRAM TRACING: CPT

## 2024-11-08 PROCEDURE — 76775 US EXAM ABDO BACK WALL LIM: CPT

## 2024-11-08 PROCEDURE — 83735 ASSAY OF MAGNESIUM: CPT

## 2024-11-08 PROCEDURE — 84100 ASSAY OF PHOSPHORUS: CPT

## 2024-11-08 PROCEDURE — 82948 REAGENT STRIP/BLOOD GLUCOSE: CPT

## 2024-11-08 PROCEDURE — 92960 CARDIOVERSION ELECTRIC EXT: CPT

## 2024-11-08 PROCEDURE — 92960 CARDIOVERSION ELECTRIC EXT: CPT | Performed by: INTERNAL MEDICINE

## 2024-11-08 PROCEDURE — 87205 SMEAR GRAM STAIN: CPT

## 2024-11-08 PROCEDURE — 84300 ASSAY OF URINE SODIUM: CPT

## 2024-11-08 RX ORDER — AMIODARONE HYDROCHLORIDE 200 MG/1
200 TABLET ORAL 2 TIMES DAILY WITH MEALS
Status: DISCONTINUED | OUTPATIENT
Start: 2024-11-08 | End: 2024-11-15 | Stop reason: HOSPADM

## 2024-11-08 RX ORDER — BUMETANIDE 0.25 MG/ML
1 INJECTION, SOLUTION INTRAMUSCULAR; INTRAVENOUS EVERY 12 HOURS
Status: DISCONTINUED | OUTPATIENT
Start: 2024-11-08 | End: 2024-11-09

## 2024-11-08 RX ADMIN — BUMETANIDE 2 MG: 0.25 INJECTION INTRAMUSCULAR; INTRAVENOUS at 05:37

## 2024-11-08 RX ADMIN — Medication 10 ML: at 09:58

## 2024-11-08 RX ADMIN — APIXABAN 5 MG: 5 TABLET, FILM COATED ORAL at 10:04

## 2024-11-08 RX ADMIN — LINAGLIPTIN 5 MG: 5 TABLET, FILM COATED ORAL at 10:05

## 2024-11-08 RX ADMIN — CLOPIDOGREL BISULFATE 75 MG: 75 TABLET ORAL at 10:05

## 2024-11-08 RX ADMIN — ATORVASTATIN CALCIUM 20 MG: 20 TABLET, FILM COATED ORAL at 20:59

## 2024-11-08 RX ADMIN — METOPROLOL SUCCINATE 25 MG: 25 TABLET, EXTENDED RELEASE ORAL at 10:03

## 2024-11-08 RX ADMIN — AMLODIPINE BESYLATE 10 MG: 10 TABLET ORAL at 10:04

## 2024-11-08 RX ADMIN — APIXABAN 5 MG: 5 TABLET, FILM COATED ORAL at 20:59

## 2024-11-08 RX ADMIN — AMIODARONE HYDROCHLORIDE 200 MG: 200 TABLET ORAL at 17:59

## 2024-11-08 RX ADMIN — METHOHEXITAL SODIUM 40 MG: 500 INJECTION, POWDER, LYOPHILIZED, FOR SOLUTION INTRAMUSCULAR; INTRAVENOUS; RECTAL at 10:52

## 2024-11-08 RX ADMIN — HYDRALAZINE HYDROCHLORIDE 100 MG: 50 TABLET ORAL at 10:03

## 2024-11-08 RX ADMIN — Medication 10 ML: at 21:00

## 2024-11-08 RX ADMIN — BUMETANIDE 1 MG: 0.25 INJECTION INTRAMUSCULAR; INTRAVENOUS at 17:59

## 2024-11-08 RX ADMIN — GLIPIZIDE 5 MG: 5 TABLET ORAL at 17:59

## 2024-11-08 RX ADMIN — AMIODARONE HYDROCHLORIDE 0.5 MG/MIN: 1.8 INJECTION, SOLUTION INTRAVENOUS at 07:32

## 2024-11-08 NOTE — PROGRESS NOTES
Nutrition Services    Patient Name:  Rock Maciel Jr.  YOB: 1945  MRN: [id number]  Admit Date:  11/7/2024    Assessment Date:  11/08/24    Summary: 78 yo male adm with persistent Atrial Fibrillation, hx below    Unable to interview patient, in Cardiac Cath    CLINICAL NUTRITION ASSESSMENT      Reason for Assessment MST score 2+     Diagnosis/Problem   Patient unsure of weight loss PTA   Medical/Surgical History Past Medical History:   Diagnosis Date    Allergic rhinitis     Bronchitis     Coronary artery disease     Diabetes mellitus 2001    DM (diabetes mellitus)     Encounter for annual health examination 05/06/2014    Annual Health Assessment    Gout     Hiatal hernia     History of MRSA infection     RIGHT FOOT 2010    Hyperlipidemia     Hypertension     Murmur     Pneumothorax on right     Sleep apnea     pt wears CPAP at night    Wellness examination 06/24/2015    Annual Wellness Visit       Past Surgical History:   Procedure Laterality Date    ARTERY SURGERY Bilateral     carotid    CARDIAC CATHETERIZATION N/A 7/20/2018    Procedure: Left Heart Cath;  Surgeon: Jo Toney MD;  Location: [facility] INVASIVE LOCATION;  Service: Cardiovascular    CARDIAC CATHETERIZATION N/A 7/20/2018    Procedure: Coronary angiography;  Surgeon: Jo Toney MD;  Location: [facility] INVASIVE LOCATION;  Service: Cardiovascular    CAROTID ENDARTERECTOMY Bilateral     COLONOSCOPY  2008    CORONARY ARTERY BYPASS GRAFT WITH AORTIC VALVE REPAIR/REPLACEMENT N/A 7/23/2018    Procedure: INTRAOPERATIVE ALEX, MIDLINE STERNOTOMY, CORONARY ARTERY BYPASS GRAFTING X 3 USING ENDOSCOPICALLY HARVESTED LEFT GREATER SAPHENOUS VEIN,  AORTIC VALVE REPLACEMENT USING 25MM LOPEZ II ULTRA PORCINE VALVE, PRP;  Surgeon: Phong Posey MD;  Location: [facility] OR;  Service: Cardiothoracic    FOOT SURGERY Right 2010    5th digit removal    FOOT SURGERY Left 2011    1 digit removed    INCISION AND DRAINAGE LEG  "Right 3/28/2020    Procedure: DEBRIDMENT OF RIGHT CALF;  Surgeon: Ross Pineda MD;  Location: [facility] MAIN OR;  Service: Vascular;  Laterality: Right;    QUADRICEPS TENDON REPAIR Left 5/14/2019    Procedure: Left QUADRICEPS TENDON REPAIR;  Surgeon: Camilo Hunter MD;  Location: [facility] MAIN OR;  Service: Orthopedics    THORACENTESIS Right 11/21/2016    THORACOSCOPY Right 5/8/2017    Procedure: BRONCHOSCOPY, RIGHT VAT,  TOTAL DECORTICATION RIGHT LUNG, PLEURAL BX, PLACEMENT SUBPLEURAL PAIN CAATHETERS X2;  Surgeon: Donald Orlando III, MD;  Location: [facility] OR;  Service:     TONSILECTOMY, ADENOIDECTOMY, BILATERAL MYRINGOTOMY AND TUBES          Anthropometrics        Current Height  Current Weight  BMI kg/m2       There is no height or weight on file to calculate BMI.   Adjusted BMI (if applicable) Ht of 73\", wt of 302# last documented   BMI Category Obese, Class II (35 - 39.9) 39.9   Ideal Body Weight (IBW) 184# +/- 10%   Usual Body Weight (UBW) UTO   Weight Trend Gain - Noted 4+ edema   Weight History Wt Readings from Last 30 Encounters:   10/03/24 1231 (!) 137 kg (302 lb)   05/17/24 1318 (!) 137 kg (301 lb 9.6 oz)   04/16/24 1319 134 kg (295 lb)   03/12/24 1312 135 kg (298 lb)   03/05/24 1305 136 kg (299 lb)   02/20/24 1443 135 kg (298 lb)   12/01/23 1600 (!) 141 kg (311 lb)   11/16/23 1501 136 kg (299 lb)   11/08/23 0909 (!) 137 kg (301 lb)   11/08/23 0917 (!) 137 kg (301 lb)   10/30/23 1259 (!) 137 kg (301 lb)   08/23/23 1042 (!) 139 kg (307 lb)   07/14/23 1123 (!) 142 kg (312 lb)   05/23/23 1352 (!) 143 kg (315 lb)   03/23/23 1401 131 kg (289 lb)   04/29/22 1109 129 kg (284 lb)   08/13/21 1307 (!) 146 kg (322 lb)   02/08/21 1110 (!) 148 kg (326 lb 14.4 oz)   10/20/20 0827 (!) 138 kg (305 lb)   08/04/20 1159 134 kg (295 lb)   04/03/20 0532 135 kg (297 lb 9.9 oz)   04/03/20 0522 135 kg (297 lb 9.9 oz)   04/02/20 0550 135 kg (297 lb 6.4 oz)   04/01/20 0621 135 kg (297 lb 12.8 oz)   03/31/20 0846 (!) " "138 kg (304 lb 4.8 oz)   03/26/20 2212 (!) 141 kg (311 lb 6.4 oz)   03/26/20 1633 136 kg (300 lb)   03/15/20 1414 136 kg (300 lb)   10/22/19 0833 (!) 137 kg (301 lb)   10/11/19 1052 (!) 136 kg (300 lb 8 oz)   09/09/19 1404 (!) 142 kg (314 lb)   08/07/19 1327 (!) 142 kg (314 lb 1.6 oz)   06/12/19 1444 (!) 143 kg (315 lb)   05/29/19 0937 (!) 143 kg (315 lb)   05/08/19 1756 (!) 143 kg (316 lb)   05/06/19 0300 (!) 144 kg (316 lb 6 oz)   05/06/19 0000 (!) 141 kg (311 lb)   04/11/19 1032 (!) 142 kg (314 lb)      --  Labs       Pertinent Labs    Results from last 7 days   Lab Units 11/08/24  0514 11/07/24  1145   SODIUM mmol/L 139 139   POTASSIUM mmol/L 4.0 4.9   CHLORIDE mmol/L 103 103   CO2 mmol/L 25.6 25.2   BUN mg/dL 66* 68*   CREATININE mg/dL 2.79* 3.10*   CALCIUM mg/dL 8.5* 9.1   BILIRUBIN mg/dL 0.5 0.6   ALK PHOS U/L 87 95   ALT (SGPT) U/L 8 8   AST (SGOT) U/L 7 10   GLUCOSE mg/dL 137* 250*     Results from last 7 days   Lab Units 11/08/24  0514 11/07/24  1145   HEMOGLOBIN g/dL  --  9.2*   HEMATOCRIT %  --  30.2*   WBC 10*3/mm3  --  6.16   TRIGLYCERIDES mg/dL 52  --    ALBUMIN g/dL 3.5 3.5     Results from last 7 days   Lab Units 11/07/24  1145   INR  1.72*   APTT seconds 35.2   PLATELETS 10*3/mm3 200     No results found for: \"COVID19\"  Lab Results   Component Value Date    HGBA1C 6.50 (H) 03/05/2024          Medications           Scheduled Medications amLODIPine, 10 mg, Oral, Daily  apixaban, 5 mg, Oral, BID  atorvastatin, 20 mg, Oral, Nightly  clopidogrel, 75 mg, Oral, Daily  glipizide, 5 mg, Oral, BID AC  hydrALAZINE, 100 mg, Oral, TID  linagliptin, 5 mg, Oral, Daily  [Held by provider] losartan, 75 mg, Oral, Daily  metoprolol succinate XL, 25 mg, Oral, Q24H  sodium chloride, 10 mL, Intravenous, Q12H  terazosin, 5 mg, Oral, Nightly       Infusions amiodarone, 0.5 mg/min, Last Rate: 0.5 mg/min (11/08/24 0732)       PRN Medications   Calcium Replacement - Follow Nurse / BPA Driven Protocol    Magnesium Standard " Dose Replacement - Follow Nurse / BPA Driven Protocol    nitroglycerin    Phosphorus Replacement - Follow Nurse / BPA Driven Protocol    Potassium Replacement - Follow Nurse / BPA Driven Protocol    sodium chloride    sodium chloride     Physical Findings          General Findings other: UTO, pt off floor   Oral/Mouth Cavity tooth or teeth missing   Edema  3+ (moderate), 4+ (severe)   Gastrointestinal WDL   Skin  skin intact   Tubes/Drains/Lines none   NFPE Unable to perform due to: pt in Cardiac Cath   --  Current Nutrition Orders & Evaluation of Intake       Oral Nutrition     Food Allergies NKFA   Current PO Diet NPO Diet NPO Type: Strict NPO   Supplement n/a   PO Evaluation     % PO Intake NPO    Factors Affecting Intake: No factors at this time known   --  PES STATEMENT / NUTRITION DIAGNOSIS      Nutrition Dx Problem  No nutritional diagnosis at this time     NUTRITION INTERVENTION / PLAN OF CARE      Intervention Goal(s) Tolerate PO          RD Intervention/Action Await initiation/advancement of PO diet   --      Prescription/Orders:       PO Diet       Supplements       Enteral Nutrition       Parenteral Nutrition    New Prescription Ordered? No changes at this time   --      Monitor/Evaluation I&O, PO intake, Weight   Discharge Plan/Needs Pending clinical course   --    RD to follow per protocol.      Electronically signed by:  Hemanth Atkinson RD  11/08/24 11:25 EST

## 2024-11-08 NOTE — CASE MANAGEMENT/SOCIAL WORK
Continued Stay Note  [facility]     Patient Name: Rock Maciel Jr.  MRN: [id number]  Today's Date: 11/8/2024    Admit Date: 11/7/2024    Plan: Home, friend to transport   Discharge Plan       Row Name 11/08/24 1345       Plan    Plan Home, friend to transport    Plan Comments No changes from previous assessment. CCP will follow for discharge readiness. No needs voiced at this time.                   Discharge Codes    No documentation.                 Expected Discharge Date and Time       Expected Discharge Date Expected Discharge Time    Nov 11, 2024               Azalia Hickey RN

## 2024-11-08 NOTE — PLAN OF CARE
Goal Outcome Evaluation:  Plan of Care Reviewed With: patient        Progress: no change  Outcome Evaluation: Pt c/o SOA when ambulating to BR. While sleeping pt would desat into 80's with home CPAP, 2L O2 bled into home CPAP. Last dose of IV Bumex given. NPO since midnight for possible cardioversion. Nephrology saw pt early this morning and cleared him for cardioversion. BLE wrapped per Nephrology.

## 2024-11-08 NOTE — CONSULTS
INITIAL CONSULT NOTE      Patient Name: Rock Maciel Jr.  : 1945  MRN: [id number]  Primary Care Physician: Denise Dickson APRN  Date of admission: 2024    Patient Care Team:  Denise Dickson APRN as PCP - General (Nurse Practitioner)  Azam Banegas Jr., MD as Consulting Physician (Cardiology)  Jamil Stevens MD as Consulting Physician (Nephrology)        Reason for Consult:       KILLIAN, CKD    Subjective   History of Present Illness:   Chief Complaint: No chief complaint on file.    HISTORY:  Rock Maciel Jr. is a 79 y.o. male with past medical history of CKD III with baseline sCr 1.99 as of 2024 who does not seem to follow any nephrologist, CAD s/p CABG, AVR , Afib, HTN, DM, HLD.    Patient admitted to [facility] by Cardiology for planned amiodarone initiation and cardioversion due to symptomatic Afib with SOB, LE edema. CXR with pulmonary congestion. Labs significant for proBNP 3,242, sCr 3.10, BUN 68, glucose 250, hgb 9.2. Home medications include amlodipine, bumex, hydralazine, losartan, metoprolol, terazosin. He was given bumex 2mg IV x 2. Nephrology consulted for KILLIAN, CKD III.    Review of systems:  Constitutional: No fever, no chills, no lethargy, no weakness.  HEENT:  No headache, otalgia, itchy eyes, nasal discharge or sore throat.  Cardiac:   Dyspnea, LE edema  Chest:              No cough, phlegm or wheezing.  Abdomen:  No abdominal pain, nausea or vomiting.  Neuro:  No focal weakness, abnormal movements or seizure-like activity.  :   No hematuria, no pyuria, no dysuria, no flank pain.  ROS was otherwise negative except as mentioned in the [place].       Personal History:     Past Medical History:   Past Medical History:   Diagnosis Date    Allergic rhinitis     Bronchitis     Coronary artery disease     Diabetes mellitus     DM (diabetes mellitus)     Encounter for annual health examination 2014    Annual Health Assessment    Gout     Hiatal hernia      History of MRSA infection     RIGHT FOOT 2010    Hyperlipidemia     Hypertension     Murmur     Pneumothorax on right     Sleep apnea     pt wears CPAP at night    Wellness examination 06/24/2015    Annual Wellness Visit       Surgical History:      Past Surgical History:   Procedure Laterality Date    ARTERY SURGERY Bilateral     carotid    CARDIAC CATHETERIZATION N/A 7/20/2018    Procedure: Left Heart Cath;  Surgeon: Jo Toney MD;  Location: [facility] CATH INVASIVE LOCATION;  Service: Cardiovascular    CARDIAC CATHETERIZATION N/A 7/20/2018    Procedure: Coronary angiography;  Surgeon: Jo Toney MD;  Location[facility] CATH [facility]VASIVE LOC[facility];  Service: Cardiovascular    CAROTID ENDARTERECTOMY Bilateral     COLONOSCOPY  2008    CORONARY ARTERY BYPASS GRAFT WITH AORTIC VALVE REPAIR/REPLACEMENT N/A 7/23/2018    Procedure: INTRAOPERATIVE ALEX, MIDLINE STERNOTOMY, CORONARY ARTERY BYPASS GRAFTING X 3 USING ENDOSCOPICALLY HARVESTED LEFT GREATER SAPHENOUS VEIN,  AORTIC VALVE REPLACEMENT USING 25MM LOPEZ II ULTRA PORCINE VALVE, PRP;  Surgeon: Phong Posey MD;  Location: [facility];  Service: Cardiothoracic    FOOT SURGERY Right 2010    5th digit removal    FOOT SURGERY Left 2011    1 digit removed    INCISION AND DRAINAGE LEG Right 3/28/2020    Procedure: DEBRIDMENT OF RIGHT CALF;  Surgeon: Ross Pineda MD;  Location: [facility] OR;  Service: Vascular;  Laterality: Right;    QUADRICEPS TENDON REPAIR Left 5/14/2019    Procedure: Left QUADRICEPS TENDON REPAIR;  Surgeon: Camilo Hunter MD;  Location: [facility] OR;  Service: Orthopedics    THORACENTESIS Right 11/21/2016    THORACOSCOPY Right 5/8/2017    Procedure: BRONCHOSCOPY, RIGHT VAT,  TOTAL DECORTICATION RIGHT LUNG, PLEURAL BX, PLACEMENT SUBPLEURAL PAIN CAATHETERS X2;  Surgeon: Donald Orlando III, MD;  Location: [facility] OR;  Service:     TONSILECTOMY, ADENOIDECTOMY, BILATERAL MYRINGOTOMY AND TUBES         Family History:  family history includes Hypertension in his father. Otherwise pertinent FHx was reviewed and unremarkable.     Social History:  reports that he has never smoked. He has never been exposed to tobacco smoke. He has never used smokeless tobacco. He reports current alcohol use. He reports that he does not use drugs.    Medications:  Prior to Admission medications    Medication Sig Start Date End Date Taking? Authorizing Provider   amLODIPine (NORVASC) 10 MG tablet TAKE 1 TABLET BY MOUTH EVERY DAY 10/29/24  Yes Denise Dickson APRN   apixaban (ELIQUIS) 5 MG tablet tablet Take 1 tablet by mouth 2 (Two) Times a Day. 10/3/24  Yes Jo Toney MD   atorvastatin (LIPITOR) 20 MG tablet Take 1 tablet by mouth Every Night. NEED TO SEE PROVIDER FOR FUTURE REFILLS. 10/10/24  Yes Denise Dickson APRN   bumetanide (BUMEX) 2 MG tablet Take 1 tablet by mouth Daily. 10/9/24  Yes Denise Dickson APRN   Cholecalciferol 25 MCG (1000 UT) tablet Take 1 tablet by mouth Daily.   Yes Shivam Smith MD   clopidogrel (PLAVIX) 75 MG tablet Take 1 tablet by mouth Daily. 10/16/24  Yes Denise Dickson APRN   glipizide (GLUCOTROL) 5 MG tablet TAKE 1 TABLET BY MOUTH 2 TIMES A DAY BEFORE MEALS. 10/29/24  Yes Denise Dickson APRN   glucose blood (Accu-Chek Keshia Plus) test strip USE TO TEST BLOOD SUGAR    TWICE DAILY FOR DIABETES 7/29/24  Yes Denise Dickson APRN   hydrALAZINE (APRESOLINE) 100 MG tablet Take 1 tablet by mouth 3 (Three) Times a Day. 4/27/23  Yes ProviderShivam MD   linagliptin (Tradjenta) 5 MG tablet tablet Take 1 tablet by mouth Daily. 10/10/24  Yes Denise Dickson APRN   losartan (COZAAR) 50 MG tablet Take 1.5 tablets by mouth Daily. 10/11/24  Yes Denise Dickson APRN   metoprolol tartrate (LOPRESSOR) 25 MG tablet Take 1 tablet by mouth 2 (Two) Times a Day. 9/19/24  Yes Denise Dickson APRN   terazosin (HYTRIN) 5 MG capsule TAKE 1 CAPSULE BY MOUTH EVERY DAY AT BEDTIME FOR 90 DAYS  8/1/24  Yes Shivam Smith MD   vitamin C (ASCORBIC ACID) 250 MG tablet Take 1 tablet by mouth Daily.   Yes Shivam Smith MD   vitamin D (ERGOCALCIFEROL) 1.25 MG (63558 UT) capsule capsule TAKE 1 CAPSULE BY MOUTH ONCE A WEEK FOR 90 DAYS 7/29/24  Yes Shivam Smith MD   Zinc 50 MG tablet  2/19/22  Yes Shivam Smith MD   metoprolol succinate XL (TOPROL-XL) 25 MG 24 hr tablet Take 1 tablet by mouth.    ProviderShivam MD     Scheduled Meds:amLODIPine, 10 mg, Oral, Daily  apixaban, 5 mg, Oral, BID  atorvastatin, 20 mg, Oral, Nightly  clopidogrel, 75 mg, Oral, Daily  glipizide, 5 mg, Oral, BID AC  hydrALAZINE, 100 mg, Oral, TID  linagliptin, 5 mg, Oral, Daily  [Held by provider] losartan, 75 mg, Oral, Daily  metoprolol succinate XL, 25 mg, Oral, Q24H  sodium chloride, 10 mL, Intravenous, Q12H  terazosin, 5 mg, Oral, Nightly      Continuous Infusions:amiodarone, 0.5 mg/min, Last Rate: 0.5 mg/min (11/08/24 0732)      PRN Meds:  Calcium Replacement - Follow Nurse / BPA Driven Protocol    Magnesium Standard Dose Replacement - Follow Nurse / BPA Driven Protocol    nitroglycerin    Phosphorus Replacement - Follow Nurse / BPA Driven Protocol    Potassium Replacement - Follow Nurse / BPA Driven Protocol    sodium chloride    sodium chloride  Allergies:    Allergies   Allergen Reactions    Ceclor [Cefaclor] Rash     Rash in his 50s; he tolerated piperacillin-tazobactam in March 2020       Objective   Exam:     Vital Signs  Temp:  [97.3 °F (36.3 °C)-98.1 °F (36.7 °C)] 98.1 °F (36.7 °C)  Heart Rate:  [] 86  Resp:  [15-18] 15  BP: (113-156)/(51-68) 156/68  SpO2:  [90 %-99 %] 97 %  on  Flow (L/min) (Oxygen Therapy):  [2] 2;   Device (Oxygen Therapy): CPAP  There is no height or weight on file to calculate BMI.  EXAM  General:  Alert male in no acute distress.    Head:      Normocephalic and atraumatic.    Eyes:      PERRL/EOM intact, conjunctivae and sclerae clear without nystagmus.    Neck:       No masses, thyromegaly,  trachea central   Lungs:    Clear bilaterally to auscultation.    Heart:      Regular rate and rhythm, no murmur no gallop  Abd:        Soft, nontender, not distended, bowel sounds positive, no shifting dullness.  Msk:        No deformity or scoliosis noted of thoracic or lumbar spine.    Pulses:   Pulses normal in all 4 extremities.    Extremities:        No cyanosis or clubbing--+ BLE edema.    Neuro:    No focal deficits.   alert oriented x3  Skin:       Intact without lesions or rashes.    Psych:    Alert and cooperative; normal mood and affect; normal attention span       Results Review:  I have personally reviewed most recent Data :  [facility]  CBC    Results from last 7 days   Lab Units 11/07/24  1145   WBC 10*3/mm3 6.16   HEMOGLOBIN g/dL 9.2*   PLATELETS 10*3/mm3 200     CMP   Results from last 7 days   Lab Units 11/08/24  0514 11/07/24  1145   SODIUM mmol/L 139 139   POTASSIUM mmol/L 4.0 4.9   CHLORIDE mmol/L 103 103   CO2 mmol/L 25.6 25.2   BUN mg/dL 66* 68*   CREATININE mg/dL 2.79* 3.10*   GLUCOSE mg/dL 137* 250*   ALBUMIN g/dL 3.5 3.5   BILIRUBIN mg/dL 0.5 0.6   ALK PHOS U/L 87 95   AST (SGOT) U/L 7 10   ALT (SGPT) U/L 8 8     ABG      XR Chest 2 View    Result Date: 11/7/2024  As described.    This report was finalized on 11/7/2024 3:25 PM by Dr. Azam Alaniz M.D on Workstation: [id number]       Results for orders placed during the hospital encounter of 10/03/24    Adult Transthoracic Echo Complete W/ Cont if Necessary Per Protocol    Interpretation Summary    Left ventricular ejection fraction appears to be 61 - 65%.    Left ventricular diastolic function was indeterminate.    The left atrial cavity is moderately dilated.    Left atrial volume is moderately increased.    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    Estimated right ventricular systolic pressure from tricuspid regurgitation is moderately elevated (45-55 mmHg).        Assessment &  Plan   Assessment and Plan:         Persistent atrial fibrillation    ASSESSMENT:  KILLIAN some increasing renal function may be because of hemodynamics  CKD III  Volume overload  Symptomatic Afib  CAD s/p CABG  Anemia  AVR  HTN  HLD  DM      Echo 10/3/24 EF 61-65% indeterminate LV diastolic function.     PLAN :     KILLIAN likely related to CR factors/hemodynamics with Afib, CHF  Home losartan on hold, agree but restarted at the time of discharge  CXR with vascular congestion, elevated proBNP, was given bumex 2mg IV x 2 on 11/7 now volume status much better but still has peripheral edema  Will cont  same dose of Bumex today  Follow-up with the labs tomorrow morning  Creatinine improved from 3.1-2.7 today  Will check UA, urine Na, urine eos, UPCR, will check renal US  Will check CK total, Mag and Phos  Electrolytes, acid/base acceptable  Anemia, will check iron studies, transfuse for hgb <7.0  BP trends labile with AFib  Noted Cardiology plan   Daily weights, Strict I&O's  Avoid nephrotoxic agents including NSAIDs  We will follow and coordinate with team    Thank you for this consultation!    Note transcribed for Dr Yann Turner, APRN  [facility]  11/8/2024  12:24 EST

## 2024-11-08 NOTE — PLAN OF CARE
Goal Outcome Evaluation:   Diuretic in Use: bumex  Response to Diuretics (Output greater than intake): output appears greater  Daily Weight (up or down): new admit  O2 Requirements: room air daytime; cpap at night  Functional Status (Activity level, tolerance and respiratory symptoms): up with 1 assist + walker  Discharge Plans:  pending Nephrology interventions and cardioversion  Estimated discharge date: 1 - 2 days.

## 2024-11-09 ENCOUNTER — APPOINTMENT (OUTPATIENT)
Dept: GENERAL RADIOLOGY | Facility: HOSPITAL | Age: 79
DRG: 309 | End: 2024-11-09
Payer: MEDICARE

## 2024-11-09 LAB
ANION GAP SERPL CALCULATED.3IONS-SCNC: 8.7 MMOL/L (ref 5–15)
BUN SERPL-MCNC: 60 MG/DL (ref 8–23)
BUN/CREAT SERPL: 23.6 (ref 7–25)
CALCIUM SPEC-SCNC: 8.5 MG/DL (ref 8.6–10.5)
CHLORIDE SERPL-SCNC: 105 MMOL/L (ref 98–107)
CO2 SERPL-SCNC: 28.3 MMOL/L (ref 22–29)
CREAT SERPL-MCNC: 2.54 MG/DL (ref 0.76–1.27)
EGFRCR SERPLBLD CKD-EPI 2021: 24.9 ML/MIN/1.73
GLUCOSE BLDC GLUCOMTR-MCNC: 133 MG/DL (ref 70–130)
GLUCOSE BLDC GLUCOMTR-MCNC: 174 MG/DL (ref 70–130)
GLUCOSE BLDC GLUCOMTR-MCNC: 196 MG/DL (ref 70–130)
GLUCOSE BLDC GLUCOMTR-MCNC: 219 MG/DL (ref 70–130)
GLUCOSE SERPL-MCNC: 118 MG/DL (ref 65–99)
POTASSIUM SERPL-SCNC: 4 MMOL/L (ref 3.5–5.2)
QT INTERVAL: 437 MS
QTC INTERVAL: 502 MS
SODIUM SERPL-SCNC: 142 MMOL/L (ref 136–145)

## 2024-11-09 PROCEDURE — 25010000002 BUMETANIDE PER 0.5 MG: Performed by: INTERNAL MEDICINE

## 2024-11-09 PROCEDURE — 93010 ELECTROCARDIOGRAM REPORT: CPT | Performed by: INTERNAL MEDICINE

## 2024-11-09 PROCEDURE — 82948 REAGENT STRIP/BLOOD GLUCOSE: CPT

## 2024-11-09 PROCEDURE — 80048 BASIC METABOLIC PNL TOTAL CA: CPT

## 2024-11-09 PROCEDURE — 93005 ELECTROCARDIOGRAM TRACING: CPT

## 2024-11-09 PROCEDURE — 25010000002 BUMETANIDE PER 0.5 MG: Performed by: PHYSICIAN ASSISTANT

## 2024-11-09 PROCEDURE — 99232 SBSQ HOSP IP/OBS MODERATE 35: CPT | Performed by: PHYSICIAN ASSISTANT

## 2024-11-09 PROCEDURE — 71045 X-RAY EXAM CHEST 1 VIEW: CPT

## 2024-11-09 RX ORDER — BUMETANIDE 0.25 MG/ML
2 INJECTION, SOLUTION INTRAMUSCULAR; INTRAVENOUS EVERY 12 HOURS
Status: DISCONTINUED | OUTPATIENT
Start: 2024-11-10 | End: 2024-11-10

## 2024-11-09 RX ORDER — BUMETANIDE 0.25 MG/ML
1 INJECTION, SOLUTION INTRAMUSCULAR; INTRAVENOUS ONCE
Status: COMPLETED | OUTPATIENT
Start: 2024-11-09 | End: 2024-11-09

## 2024-11-09 RX ADMIN — CLOPIDOGREL BISULFATE 75 MG: 75 TABLET ORAL at 09:47

## 2024-11-09 RX ADMIN — APIXABAN 5 MG: 5 TABLET, FILM COATED ORAL at 21:15

## 2024-11-09 RX ADMIN — HYDRALAZINE HYDROCHLORIDE 100 MG: 50 TABLET ORAL at 21:15

## 2024-11-09 RX ADMIN — AMIODARONE HYDROCHLORIDE 200 MG: 200 TABLET ORAL at 09:47

## 2024-11-09 RX ADMIN — AMLODIPINE BESYLATE 10 MG: 10 TABLET ORAL at 09:46

## 2024-11-09 RX ADMIN — Medication 10 ML: at 10:53

## 2024-11-09 RX ADMIN — ATORVASTATIN CALCIUM 20 MG: 20 TABLET, FILM COATED ORAL at 21:15

## 2024-11-09 RX ADMIN — GLIPIZIDE 5 MG: 5 TABLET ORAL at 06:30

## 2024-11-09 RX ADMIN — HYDRALAZINE HYDROCHLORIDE 100 MG: 50 TABLET ORAL at 09:47

## 2024-11-09 RX ADMIN — Medication 10 ML: at 21:15

## 2024-11-09 RX ADMIN — BUMETANIDE 1 MG: 0.25 INJECTION INTRAMUSCULAR; INTRAVENOUS at 17:36

## 2024-11-09 RX ADMIN — BUMETANIDE 1 MG: 0.25 INJECTION INTRAMUSCULAR; INTRAVENOUS at 17:35

## 2024-11-09 RX ADMIN — HYDRALAZINE HYDROCHLORIDE 100 MG: 50 TABLET ORAL at 17:35

## 2024-11-09 RX ADMIN — GLIPIZIDE 5 MG: 5 TABLET ORAL at 17:35

## 2024-11-09 RX ADMIN — TERAZOSIN HYDROCHLORIDE 5 MG: 5 CAPSULE ORAL at 21:15

## 2024-11-09 RX ADMIN — METOPROLOL SUCCINATE 25 MG: 25 TABLET, EXTENDED RELEASE ORAL at 09:47

## 2024-11-09 RX ADMIN — AMIODARONE HYDROCHLORIDE 200 MG: 200 TABLET ORAL at 17:35

## 2024-11-09 RX ADMIN — LINAGLIPTIN 5 MG: 5 TABLET, FILM COATED ORAL at 09:47

## 2024-11-09 NOTE — PLAN OF CARE
Goal Outcome Evaluation:  Plan of Care Reviewed With: patient   Patients O2 sating in the 80s. Applied 2L NC. Seems to be using accessory muscles to breath. MD notified. Patient didn't seem to be drinking so encouraged oral hydration and discussed the importance of mobility. Pt consulted. Awaiting more imaging. VSS. Alert and oriented. Safety maintained.

## 2024-11-09 NOTE — CONSULTS
INITIAL CONSULT NOTE      Patient Name: Rock Maciel Jr.  : 1945  MRN: [id number]  Primary Care Physician: Denise Dickson APRN  Date of admission: 2024    Patient Care Team:  Denise Dickson APRN as PCP - General (Nurse Practitioner)  Azam Banegas Jr., MD as Consulting Physician (Cardiology)  Jamil Stevens MD as Consulting Physician (Nephrology)        Reason for Consult:       KILLIAN, CKD  Subjective   History of Present Illness:   Chief Complaint: No chief complaint on file.    HISTORY:  Rock Maciel Jr. is a 79 y.o. male with past medical history of coronary artery disease, status post CABG x 2 in 2019, AVR in 2019, paroxysmal A-fib, on Eliquis, hypertension, coronary artery disease, status post CEA, DM type II, hyperlipidemia, patient was seen in the office by cardiology on 10/28 and was found to be in A-fib and was symptomatic therefore was admitted for amiodarone initiation and elective cardioversion.  Echo showed EF of 61 to 65%, intermediate LV diastolic function.  Left atrial cavity and moderately dilated.    Nephrology consulted due to elevated creatinine, KILLIAN on CKD, risk factors include hemodynamics, with A-fib, fluid overload due to CHF, on losartan, chest x-ray showed vascular congestion.  Was treated with IV Bumex x 2 on .    Review of systems:  Same except for mentioned above  Constitutional: No fever, no chills, no lethargy, no weakness.  HEENT:  No headache, otalgia, itchy eyes, nasal discharge or sore throat.  Cardiac:  No chest pain, dyspnea, orthopnea or PND.  Chest:              No cough, phlegm or wheezing.  Abdomen:  No abdominal pain, nausea or vomiting.  Neuro:  No focal weakness, abnormal movements or seizure-like activity.  :   No hematuria, no pyuria, no dysuria, no flank pain.  ROS was otherwise negative except as mentioned in the [place].       Personal History:     Past Medical History:   Past Medical History:   Diagnosis Date    Allergic rhinitis      Bronchitis     Coronary artery disease     Diabetes mellitus 2001    DM (diabetes mellitus)     Encounter for annual health examination 05/06/2014    Annual Health Assessment    Gout     Hiatal hernia     History of MRSA infection     RIGHT FOOT 2010    Hyperlipidemia     Hypertension     Murmur     Pneumothorax on right     Sleep apnea     pt wears CPAP at night    Wellness examination 06/24/2015    Annual Wellness Visit       Surgical History:      Past Surgical History:   Procedure Laterality Date    ARTERY SURGERY Bilateral     carotid    CARDIAC CATHETERIZATION N/A 7/20/2018    Procedure: Left Heart Cath;  Surgeon: Jo Toney MD;  Location[facility];  Service: Cardiovascular    CARDIAC CATHETERIZATION N/A 7/20/2018    Procedure: Coronary angiography;  Surgeon: Jo Toney MD;  [facility];  Service: Cardiovascular    CAROTID ENDARTERECTOMY Bilateral     COLONOSCOPY  2008    CORONARY ARTERY BYPASS GRAFT WITH AORTIC VALVE REPAIR/REPLACEMENT N/A 7/23/2018    Procedure: INTRAOPERATIVE ALEX, MIDLINE STERNOTOMY, CORONARY ARTERY BYPASS GRAFTING X 3 USING ENDOSCOPICALLY HARVESTED LEFT GREATER SAPHENOUS VEIN,  AORTIC VALVE REPLACEMENT USING 25MM LOPEZ II ULTRA PORCINE VALVE, PRP;  Surgeon: Phong Posey MD;  Location: [facility] OR;  Service: Cardiothoracic    FOOT SURGERY Right 2010    5th digit removal    FOOT SURGERY Left 2011    1 digit removed    INCISION AND DRAINAGE LEG Right 3/28/2020    Procedure: DEBRIDMENT OF RIGHT CALF;  Surgeon: Ross Pineda MD;  Location: [facility] OR;  Service: Vascular;  Laterality: Right;    QUADRICEPS TENDON REPAIR Left 5/14/2019    Procedure: Left QUADRICEPS TENDON REPAIR;  Surgeon: Camilo Hunter MD;  Location: [facility] OR;  Service: Orthopedics    THORACENTESIS Right 11/21/2016    THORACOSCOPY Right 5/8/2017    Procedure: BRONCHOSCOPY, RIGHT VAT,  TOTAL DECORTICATION RIGHT LUNG,  PLEURAL BX, PLACEMENT SUBPLEURAL PAIN CAATHETERS X2;  Surgeon: Donald Orlando III, MD;  Location: [facility] OR;  Service:     TONSILECTOMY, ADENOIDECTOMY, BILATERAL MYRINGOTOMY AND TUBES         Family History: family history includes Hypertension in his father. Otherwise pertinent FHx was reviewed and unremarkable.     Social History:  reports that he has never smoked. He has never been exposed to tobacco smoke. He has never used smokeless tobacco. He reports current alcohol use. He reports that he does not use drugs.    Medications:  Prior to Admission medications    Medication Sig Start Date End Date Taking? Authorizing Provider   amLODIPine (NORVASC) 10 MG tablet TAKE 1 TABLET BY MOUTH EVERY DAY 10/29/24  Yes Denise Dickson APRN   apixaban (ELIQUIS) 5 MG tablet tablet Take 1 tablet by mouth 2 (Two) Times a Day. 10/3/24  Yes Jo Toney MD   atorvastatin (LIPITOR) 20 MG tablet Take 1 tablet by mouth Every Night. NEED TO SEE PROVIDER FOR FUTURE REFILLS. 10/10/24  Yes Denise Dickson APRN   bumetanide (BUMEX) 2 MG tablet Take 1 tablet by mouth Daily. 10/9/24  Yes Denise Dickson APRN   Cholecalciferol 25 MCG (1000 UT) tablet Take 1 tablet by mouth Daily.   Yes Shivam Smith MD   clopidogrel (PLAVIX) 75 MG tablet Take 1 tablet by mouth Daily. 10/16/24  Yes Denise Dickson APRN   glipizide (GLUCOTROL) 5 MG tablet TAKE 1 TABLET BY MOUTH 2 TIMES A DAY BEFORE MEALS. 10/29/24  Yes Denise Dickson APRN   glucose blood (Accu-Chek Keshia Plus) test strip USE TO TEST BLOOD SUGAR    TWICE DAILY FOR DIABETES 7/29/24  Yes Denise Dickson APRN   hydrALAZINE (APRESOLINE) 100 MG tablet Take 1 tablet by mouth 3 (Three) Times a Day. 4/27/23  Yes Shivam Smith MD   linagliptin (Tradjenta) 5 MG tablet tablet Take 1 tablet by mouth Daily. 10/10/24  Yes Dneise Dickson APRN   losartan (COZAAR) 50 MG tablet Take 1.5 tablets by mouth Daily. 10/11/24  Yes Denise Dickson APRN    metoprolol tartrate (LOPRESSOR) 25 MG tablet Take 1 tablet by mouth 2 (Two) Times a Day. 9/19/24  Yes Denise Dickson APRN   terazosin (HYTRIN) 5 MG capsule TAKE 1 CAPSULE BY MOUTH EVERY DAY AT BEDTIME FOR 90 DAYS 8/1/24  Yes Shivam Smith MD   vitamin C (ASCORBIC ACID) 250 MG tablet Take 1 tablet by mouth Daily.   Yes Shivam Smith MD   vitamin D (ERGOCALCIFEROL) 1.25 MG (08818 UT) capsule capsule TAKE 1 CAPSULE BY MOUTH ONCE A WEEK FOR 90 DAYS 7/29/24  Yes Shivam Smith MD   Zinc 50 MG tablet  2/19/22  Yes Shivam Smith MD   metoprolol succinate XL (TOPROL-XL) 25 MG 24 hr tablet Take 1 tablet by mouth.    ProviderShivam MD     Scheduled Meds:amiodarone, 200 mg, Oral, BID With Meals  amLODIPine, 10 mg, Oral, Daily  apixaban, 5 mg, Oral, BID  atorvastatin, 20 mg, Oral, Nightly  bumetanide, 1 mg, Intravenous, Q12H  bumetanide, 1 mg, Intravenous, Once  clopidogrel, 75 mg, Oral, Daily  glipizide, 5 mg, Oral, BID AC  hydrALAZINE, 100 mg, Oral, TID  linagliptin, 5 mg, Oral, Daily  [Held by provider] losartan, 75 mg, Oral, Daily  metoprolol succinate XL, 25 mg, Oral, Q24H  sodium chloride, 10 mL, Intravenous, Q12H  terazosin, 5 mg, Oral, Nightly      Continuous Infusions:   PRN Meds:  Calcium Replacement - Follow Nurse / BPA Driven Protocol    Magnesium Standard Dose Replacement - Follow Nurse / BPA Driven Protocol    nitroglycerin    Phosphorus Replacement - Follow Nurse / BPA Driven Protocol    Potassium Replacement - Follow Nurse / BPA Driven Protocol    sodium chloride    sodium chloride  Allergies:    Allergies   Allergen Reactions    Ceclor [Cefaclor] Rash     Rash in his 50s; he tolerated piperacillin-tazobactam in March 2020       Objective   Exam:     Vital Signs  Temp:  [97.3 °F (36.3 °C)-98.4 °F (36.9 °C)] 97.5 °F (36.4 °C)  Heart Rate:  [77-87] 84  Resp:  [18-20] 18  BP: (132-161)/(47-60) 156/55  SpO2:  [90 %-97 %] 92 %  on  Flow (L/min) (Oxygen Therapy):  [1-2] 2;    Device (Oxygen Therapy): nasal cannula  Body mass index is 38.55 kg/m².  EXAM  General: Clinically stable, male in no acute distress.    Head:      Normocephalic and atraumatic.    Eyes:      PERRL/EOM intact, conjunctivae and sclerae clear without nystagmus.    Neck:      No masses, thyromegaly,  trachea central   Lungs:    Clear bilaterally to auscultation.    Heart:      Regular rate and rhythm, no murmur no gallop  Abd:        Soft, nontender, not distended, bowel sounds positive, no shifting dullness.  Msk:        No deformity or scoliosis noted of thoracic or lumbar spine.    Pulses:   Pulses normal in all 4 extremities.    Extremities:        No cyanosis or clubbing--bilateral lower extremity edema.    Neuro:    No focal deficits.   alert oriented x3  Skin:       Intact without lesions or rashes.    Psych:    Alert and cooperative; normal mood and affect; normal attention span       Results Review:  I have personally reviewed most recent Data :  [facility]  CBC    Results from last 7 days   Lab Units 11/07/24  1145   WBC 10*3/mm3 6.16   HEMOGLOBIN g/dL 9.2*   PLATELETS 10*3/mm3 200     CMP   Results from last 7 days   Lab Units 11/09/24  0419 11/08/24  0514 11/07/24  1145   SODIUM mmol/L 142 139 139   POTASSIUM mmol/L 4.0 4.0 4.9   CHLORIDE mmol/L 105 103 103   CO2 mmol/L 28.3 25.6 25.2   BUN mg/dL 60* 66* 68*   CREATININE mg/dL 2.54* 2.79* 3.10*   GLUCOSE mg/dL 118* 137* 250*   ALBUMIN g/dL  --  3.5 3.5   BILIRUBIN mg/dL  --  0.5 0.6   ALK PHOS U/L  --  87 95   AST (SGOT) U/L  --  7 10   ALT (SGPT) U/L  --  8 8     ABG      XR Chest 1 View    Result Date: 11/9/2024   1. Cardiomegaly with vascular congestion. 2. Ill-defined left midlung opacity. Possible pneumonia. Follow-up to resolution. 3. Subsegmental atelectasis suspected at the bases. 4. Small right pleural effusion 5. Calcified left pleural plaques, may be sequela of prior empyema or hemothorax.  This report was finalized on  11/9/2024 2:25 PM by Dr. Renny Carrion M.D on Workstation: [id number]      US Renal Bilateral    Result Date: 11/8/2024  1.  No right-sided hydronephrosis. Unfortunately a significant portion of the left kidney is obscured by overlying shadowing and acute renal pathology cannot be excluded on the left. Further evaluation with CT abdomen pelvis is recommended to better characterize given patient history 2.  Thickening, trabeculated appearance of the bladder, cannot be evaluated on ultrasound. Attention on above-mentioned CT abdomen pelvis recommended. 3.  Other findings as above.    This report was finalized on 11/8/2024 11:49 PM by Dr. Jermaine Quintero M.D on Workstation: [id number]      XR Chest 2 View    Result Date: 11/7/2024  As described.    This report was finalized on 11/7/2024 3:25 PM by Dr. Azam Alaniz M.D on Workstation: [id number]       Results for orders placed during the hospital encounter of 10/03/24    Adult Transthoracic Echo Complete W/ Cont if Necessary Per Protocol    Interpretation Summary    Left ventricular ejection fraction appears to be 61 - 65%.    Left ventricular diastolic function was indeterminate.    The left atrial cavity is moderately dilated.    Left atrial volume is moderately increased.    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    Estimated right ventricular systolic pressure from tricuspid regurgitation is moderately elevated (45-55 mmHg).        Assessment & Plan   Assessment and Plan:         Persistent atrial fibrillation    ASSESSMENT:  KILLIAN some increasing renal function may be because of hemodynamics  CKD III  Volume overload  Symptomatic Afib  CAD s/p CABG  Anemia  AVR  HTN  HLD  DM          PLAN :     KILLIAN likely related to CR factors/hemodynamics with Afib, CHF  Home losartan on hold, agree but restarted at the time of discharge  CXR with vascular congestion, elevated proBNP, was given bumex 2mg IV x 2 on 11/7 now volume status much better but still has  peripheral edema  We will continue Bumex 1 mg IV every 12 hours  Follow-up with the labs tomorrow morning  Creatinine improved from 3.1-2.5 today, losartan on hold  Patient underwent cardioversion yesterday, was transitioned to p.o. amiodarone  Will check UA, urine Na, urine eos, UPCR, will check renal US  Will check CK total, Mag and Phos  Electrolytes, acid/base acceptable  Anemia, will check iron studies, transfuse for hgb <7.0  BP trends labile with AFib  Noted Cardiology plan   Daily weights, Strict I&O's  Avoid nephrotoxic agents including NSAIDs  We will follow and coordinate with team   Thank you for this consultation!      ZEINA Chauhan  [facility]  11/9/2024  16:25 EST    Pt seen, chart reviewed, agree with plans.  Leo Garcia MD.  [facility].

## 2024-11-09 NOTE — PLAN OF CARE
Goal Outcome Evaluation:   Diuretic in Use: bumex  Response to Diuretics (Output greater than intake):  output appears greater  Daily Weight (up or down): consecutive weights not taken  O2 Requirements: room air when awake; cpap or oxygen per NC when asleep  Functional Status (Activity level, tolerance and respiratory symptoms): up with 1 assist + walker  Discharge Plans:  possibly home in 3 days.

## 2024-11-09 NOTE — PROGRESS NOTES
PROGRESS NOTE      Patient Name: Rock Maciel Jr.  : 1945  MRN: [id number]  Primary Care Physician: Denise Dickson APRN  Date of admission: 2024    Patient Care Team:  Denise Dickson APRN as PCP - General (Nurse Practitioner)  Azam Banegas Jr., MD as Consulting Physician (Cardiology)  Jamil Stevens MD as Consulting Physician (Nephrology)        Subjective   Subjective:     Patient seen and examined earlier today, denies any acute complaints.  Review of systems:  No complaints of any fever chills chest pain, some swelling of the legs, no abdominal pain nausea vomiting.      Allergies:    Allergies   Allergen Reactions    Ceclor [Cefaclor] Rash     Rash in his 50s; he tolerated piperacillin-tazobactam in 2020       Objective   Exam:     Vital Signs  Temp:  [97.3 °F (36.3 °C)-98.4 °F (36.9 °C)] 97.5 °F (36.4 °C)  Heart Rate:  [77-87] 84  Resp:  [18-20] 18  BP: (132-161)/(47-60) 156/55  SpO2:  [90 %-97 %] 92 %  on  Flow (L/min) (Oxygen Therapy):  [1-2] 2;   Device (Oxygen Therapy): nasal cannula  Body mass index is 38.55 kg/m².    General: Elderly male in no acute distress.    Head:      Normocephalic and atraumatic.    Eyes:      PERRL/EOM intact, conjunctivae and sclerae clear without nystagmus.    Neck:      No masses, thyromegaly,  trachea central with normal respiratory effort   Lungs:    Clear bilaterally to auscultation.    Heart:      Regular rate and rhythm, no murmur no gallop  Abd:        Soft, nontender, not distended, bowel sounds positive, no shifting dullness   Pulses:   Pulses palpable  Extr:        No cyanosis or clubbing--1+ edema.  Bilateral foot surgery both lower extremities dressed  Neuro:    No focal deficits.   alert oriented x3  Skin:       Intact without lesions or rashes.    Psych:    Alert and cooperative; normal mood and affect; .      Results Review:  I have personally reviewed most recent Data :  CBC    Results from last 7 days   Lab Units  11/07/24  1145   WBC 10*3/mm3 6.16   HEMOGLOBIN g/dL 9.2*   PLATELETS 10*3/mm3 200     CMP   Results from last 7 days   Lab Units 11/09/24  0419 11/08/24  0514 11/07/24  1145   SODIUM mmol/L 142 139 139   POTASSIUM mmol/L 4.0 4.0 4.9   CHLORIDE mmol/L 105 103 103   CO2 mmol/L 28.3 25.6 25.2   BUN mg/dL 60* 66* 68*   CREATININE mg/dL 2.54* 2.79* 3.10*   GLUCOSE mg/dL 118* 137* 250*   ALBUMIN g/dL  --  3.5 3.5   BILIRUBIN mg/dL  --  0.5 0.6   ALK PHOS U/L  --  87 95   AST (SGOT) U/L  --  7 10   ALT (SGPT) U/L  --  8 8     ABG      XR Chest 1 View    Result Date: 11/9/2024   1. Cardiomegaly with vascular congestion. 2. Ill-defined left midlung opacity. Possible pneumonia. Follow-up to resolution. 3. Subsegmental atelectasis suspected at the bases. 4. Small right pleural effusion 5. Calcified left pleural plaques, may be sequela of prior empyema or hemothorax.  This report was finalized on 11/9/2024 2:25 PM by Dr. Renny Carrion M.D on Workstation: [id number]       Renal Bilateral    Result Date: 11/8/2024  1.  No right-sided hydronephrosis. Unfortunately a significant portion of the left kidney is obscured by overlying shadowing and acute renal pathology cannot be excluded on the left. Further evaluation with CT abdomen pelvis is recommended to better characterize given patient history 2.  Thickening, trabeculated appearance of the bladder, cannot be evaluated on ultrasound. Attention on above-mentioned CT abdomen pelvis recommended. 3.  Other findings as above.    This report was finalized on 11/8/2024 11:49 PM by Dr. Jermaine Quintero M.D on Workstation: [id number]       Results for orders placed during the hospital encounter of 10/03/24    Adult Transthoracic Echo Complete W/ Cont if Necessary Per Protocol    Interpretation Summary    Left ventricular ejection fraction appears to be 61 - 65%.    Left ventricular diastolic function was indeterminate.    The left atrial cavity is moderately dilated.    Left atrial  volume is moderately increased.    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    Estimated right ventricular systolic pressure from tricuspid regurgitation is moderately elevated (45-55 mmHg).    Scheduled Meds:amiodarone, 200 mg, Oral, BID With Meals  amLODIPine, 10 mg, Oral, Daily  apixaban, 5 mg, Oral, BID  atorvastatin, 20 mg, Oral, Nightly  bumetanide, 1 mg, Intravenous, Q12H  bumetanide, 1 mg, Intravenous, Once  clopidogrel, 75 mg, Oral, Daily  glipizide, 5 mg, Oral, BID AC  hydrALAZINE, 100 mg, Oral, TID  linagliptin, 5 mg, Oral, Daily  [Held by provider] losartan, 75 mg, Oral, Daily  metoprolol succinate XL, 25 mg, Oral, Q24H  sodium chloride, 10 mL, Intravenous, Q12H  terazosin, 5 mg, Oral, Nightly      Continuous Infusions:   PRN Meds:  Calcium Replacement - Follow Nurse / BPA Driven Protocol    Magnesium Standard Dose Replacement - Follow Nurse / BPA Driven Protocol    nitroglycerin    Phosphorus Replacement - Follow Nurse / BPA Driven Protocol    Potassium Replacement - Follow Nurse / BPA Driven Protocol    sodium chloride    sodium chloride    Assessment & Plan   Assessment and Plan:         Persistent atrial fibrillation    ASSESSMENT:  Acute kidney injury  Baseline CKD stage III  Volume overload  Atrial fibrillation  Coronary artery disease status post bypass surgery  Anemia  Status post AVR 2019  History of hypertension  Echocardiogram October 2024 ejection fraction 61 to 65%  Bilateral feet surgery    Plan    Renal function is improving  Acute kidney injury most likely hemodynamic mediated cardiorenal etiology  Baseline creatinine around 1.4 to 1.7 mg/dL, admission creatinine 3.1 mg/dL, creatinine improved  Currently on Bumex 1 mg IV every 12 hours, will increase to 2 mg IV every 12 hours  Was on losartan at home, will hold for now  Monitor blood pressure if low will decrease hydralazine  Low-grade proteinuria continue to monitor  Renal ultrasound, limited study, left kidney obscured by  overlying shadowing, trabeculated appearance of the bladder, will check CT scan without contrast  Discussed with patient's nurse, may need PT evaluation  Followed by cardiology  Discussed with patient caregivers at length at bedside  Will continue to follow      Electronically signed by Maru Talavera MD,   [facility]  [phone]  11/9/2024  16:53 EST

## 2024-11-09 NOTE — PROGRESS NOTES
[facility]    Patient Name: Rock Maciel Jr.  :1945  79 y.o.  LOS: 1  Encounter Provider: Kobe Celeste PA-C      Patient Care Team:  Denise Dickson APRN as PCP - General (Nurse Practitioner)  Azam Banegas Jr., MD as Consulting Physician (Cardiology)  Jamil Stevens MD as Consulting Physician (Nephrology)    Chief Complaint:  Persistent atrial fibrillation    Echo 10/3/24 EF 61-65% indeterminate LV diastolic function. Left atrial cavity is moderately dilated, left atrial volume      Stress test 2023 small sized infarct located in lateral wall with no significant ischemia noted.     Cardiac catheterization 2018 normal left main, minimum diffuse left anterior descending irregularity with midportion 40 to 50% stenosis.  Circumflex artery has OM ostial 70% distal circumflex 60% stenosis.  RCA dominant mid and distal portion 70% stenosis.  Severe AV stenosis.    Interval History: Patient felt better after his cardioversion yesterday.  Patient began feeling acute shortness of air earlier with his oxygenation being in the 70s he was placed on 2 L by nursing staff.  Patient denies any palpitations, or chest pain.       Objective   Vital Signs  Temp:  [97.3 °F (36.3 °C)-98.4 °F (36.9 °C)] 97.3 °F (36.3 °C)  Heart Rate:  [77-87] 78  Resp:  [18-20] 20  BP: (132-161)/(47-60) 161/60    Intake/Output Summary (Last 24 hours) at 2024 1339  Last data filed at 2024 0945  Gross per 24 hour   Intake 480 ml   Output 2365 ml   Net -1885 ml     Flowsheet Rows      Flowsheet Row First Filed Value   Admission Height --   Admission Weight 132 kg (292 lb 1.6 oz) Documented at 2024 0601              Constitutional:       General: Awake.      Appearance: Chronically ill-appearing.   Pulmonary:      Effort: Tachypnea (mild) present.      Breath sounds: Examination of the right-lower field reveals rales. Examination of the left-lower field reveals rales.   Cardiovascular:       Normal rate. Regular rhythm.      Murmurs: There is no murmur.      Comments: B/l Legs wrapped with ace wraps  Edema:     Peripheral edema present.  Neurological:      Mental Status: Alert.   Psychiatric:         Behavior: Behavior is cooperative.         Pertinent Test Results:    Chest x-ray on 11/7/2024  FINDINGS: The heart is mildly enlarged. Sternotomy wires, cardiac valve  marker noted. Pulmonary vasculature is congested. Mild patchy density in  the lower lungs could be developing edema and/or pneumonia, follow-up  suggested. Minimal pleural effusion is apparent on the lateral view. No  pneumothorax. Right hemidiaphragm remains elevated. No acute osseous  process.     IMPRESSION:  As described.    Results from last 7 days   Lab Units 11/09/24  0419 11/08/24  0514 11/07/24  1145   SODIUM mmol/L 142 139 139   POTASSIUM mmol/L 4.0 4.0 4.9   CHLORIDE mmol/L 105 103 103   CO2 mmol/L 28.3 25.6 25.2   BUN mg/dL 60* 66* 68*   CREATININE mg/dL 2.54* 2.79* 3.10*   GLUCOSE mg/dL 118* 137* 250*   CALCIUM mg/dL 8.5* 8.5* 9.1   AST (SGOT) U/L  --  7 10   ALT (SGPT) U/L  --  8 8     Results from last 7 days   Lab Units 11/08/24  0514   CK TOTAL U/L 39     Results from last 7 days   Lab Units 11/07/24  1145   WBC 10*3/mm3 6.16   HEMOGLOBIN g/dL 9.2*   HEMATOCRIT % 30.2*   PLATELETS 10*3/mm3 200     Results from last 7 days   Lab Units 11/07/24  1145   INR  1.72*   APTT seconds 35.2     Results from last 7 days   Lab Units 11/08/24  0514   MAGNESIUM mg/dL 2.2     Results from last 7 days   Lab Units 11/08/24  0514   CHOLESTEROL mg/dL 81   TRIGLYCERIDES mg/dL 52   HDL CHOL mg/dL 34*     Results from last 7 days   Lab Units 11/07/24  1145   PROBNP pg/mL 3,242.0*               Medication Review:   amiodarone, 200 mg, Oral, BID With Meals  amLODIPine, 10 mg, Oral, Daily  apixaban, 5 mg, Oral, BID  atorvastatin, 20 mg, Oral, Nightly  bumetanide, 1 mg, Intravenous, Q12H  clopidogrel, 75 mg, Oral, Daily  glipizide, 5 mg, Oral, BID  AC  hydrALAZINE, 100 mg, Oral, TID  linagliptin, 5 mg, Oral, Daily  [Held by provider] losartan, 75 mg, Oral, Daily  metoprolol succinate XL, 25 mg, Oral, Q24H  sodium chloride, 10 mL, Intravenous, Q12H  terazosin, 5 mg, Oral, Nightly              Assessment & Plan   Persistent atrial fibrillation  Coronary artery disease history of CABG times 2 in 2019  Bioprosthetic aortic valve replacement  Hypertension  Hyperlipidemia  Diabetes mellitus    Symptomatic persistent atrial fibrillation-patient was admitted for initiation of amiodarone and elective cardioversion.  He had not missed any doses of his anticoagulation.  He was to continue his Eliquis.  He underwent cardioversion yesterday.  Patient transitioned over to p.o. amiodarone    2.  SOA/Leg edema BNP yesterday of 3,242 with pulmonary vascular congestion noted on chest x-ray performed yesterday.  Patient Bumex 2 mg IV x 2 given yesterday.  Losartan currently being held. Started on bumex 1 mg q12hr yesterday. Due to worsening SOA and on oxygen will get state chest xray and give bumex 1 mg IV x1.     3.  KILLIAN- Nephrology consulted. Appreciate recommendations.     I reviewed intervention with Dr. Mills and will follow up on chest xray.          Kobe Celeste PA-C  [facility]   [facility]  [address] [place] [address] - [address]  [place] [address]  Office: [phone]    11/09/24  13:39 EST

## 2024-11-10 ENCOUNTER — APPOINTMENT (OUTPATIENT)
Dept: CT IMAGING | Facility: HOSPITAL | Age: 79
DRG: 309 | End: 2024-11-10
Payer: MEDICARE

## 2024-11-10 ENCOUNTER — APPOINTMENT (OUTPATIENT)
Dept: GENERAL RADIOLOGY | Facility: HOSPITAL | Age: 79
DRG: 309 | End: 2024-11-10
Payer: MEDICARE

## 2024-11-10 LAB
ANION GAP SERPL CALCULATED.3IONS-SCNC: 8.9 MMOL/L (ref 5–15)
BUN SERPL-MCNC: 55 MG/DL (ref 8–23)
BUN/CREAT SERPL: 25.1 (ref 7–25)
CALCIUM SPEC-SCNC: 8.8 MG/DL (ref 8.6–10.5)
CHLORIDE SERPL-SCNC: 104 MMOL/L (ref 98–107)
CO2 SERPL-SCNC: 29.1 MMOL/L (ref 22–29)
CREAT SERPL-MCNC: 2.19 MG/DL (ref 0.76–1.27)
EGFRCR SERPLBLD CKD-EPI 2021: 29.7 ML/MIN/1.73
GLUCOSE BLDC GLUCOMTR-MCNC: 102 MG/DL (ref 70–130)
GLUCOSE BLDC GLUCOMTR-MCNC: 109 MG/DL (ref 70–130)
GLUCOSE BLDC GLUCOMTR-MCNC: 147 MG/DL (ref 70–130)
GLUCOSE BLDC GLUCOMTR-MCNC: 234 MG/DL (ref 70–130)
GLUCOSE SERPL-MCNC: 101 MG/DL (ref 65–99)
POTASSIUM SERPL-SCNC: 4.3 MMOL/L (ref 3.5–5.2)
QT INTERVAL: 445 MS
QTC INTERVAL: 504 MS
SODIUM SERPL-SCNC: 142 MMOL/L (ref 136–145)

## 2024-11-10 PROCEDURE — 97162 PT EVAL MOD COMPLEX 30 MIN: CPT

## 2024-11-10 PROCEDURE — 80048 BASIC METABOLIC PNL TOTAL CA: CPT

## 2024-11-10 PROCEDURE — 25010000002 BUMETANIDE PER 0.5 MG: Performed by: INTERNAL MEDICINE

## 2024-11-10 PROCEDURE — 82948 REAGENT STRIP/BLOOD GLUCOSE: CPT

## 2024-11-10 PROCEDURE — 99232 SBSQ HOSP IP/OBS MODERATE 35: CPT | Performed by: STUDENT IN AN ORGANIZED HEALTH CARE EDUCATION/TRAINING PROGRAM

## 2024-11-10 PROCEDURE — 71045 X-RAY EXAM CHEST 1 VIEW: CPT

## 2024-11-10 PROCEDURE — 97110 THERAPEUTIC EXERCISES: CPT

## 2024-11-10 PROCEDURE — 93005 ELECTROCARDIOGRAM TRACING: CPT

## 2024-11-10 RX ORDER — BUMETANIDE 0.25 MG/ML
2 INJECTION, SOLUTION INTRAMUSCULAR; INTRAVENOUS EVERY 8 HOURS
Status: DISCONTINUED | OUTPATIENT
Start: 2024-11-10 | End: 2024-11-11

## 2024-11-10 RX ORDER — BUMETANIDE 2 MG/1
2 TABLET ORAL DAILY
Status: DISCONTINUED | OUTPATIENT
Start: 2024-11-11 | End: 2024-11-10 | Stop reason: SDUPTHER

## 2024-11-10 RX ADMIN — AMLODIPINE BESYLATE 10 MG: 10 TABLET ORAL at 09:00

## 2024-11-10 RX ADMIN — TERAZOSIN HYDROCHLORIDE 5 MG: 5 CAPSULE ORAL at 22:13

## 2024-11-10 RX ADMIN — GLIPIZIDE 5 MG: 5 TABLET ORAL at 16:59

## 2024-11-10 RX ADMIN — METOPROLOL SUCCINATE 25 MG: 25 TABLET, EXTENDED RELEASE ORAL at 09:00

## 2024-11-10 RX ADMIN — BUMETANIDE 2 MG: 0.25 INJECTION INTRAMUSCULAR; INTRAVENOUS at 16:01

## 2024-11-10 RX ADMIN — AMIODARONE HYDROCHLORIDE 200 MG: 200 TABLET ORAL at 09:00

## 2024-11-10 RX ADMIN — GLIPIZIDE 5 MG: 5 TABLET ORAL at 09:00

## 2024-11-10 RX ADMIN — HYDRALAZINE HYDROCHLORIDE 100 MG: 50 TABLET ORAL at 16:59

## 2024-11-10 RX ADMIN — HYDRALAZINE HYDROCHLORIDE 100 MG: 50 TABLET ORAL at 09:00

## 2024-11-10 RX ADMIN — CLOPIDOGREL BISULFATE 75 MG: 75 TABLET ORAL at 09:00

## 2024-11-10 RX ADMIN — AMIODARONE HYDROCHLORIDE 200 MG: 200 TABLET ORAL at 17:00

## 2024-11-10 RX ADMIN — Medication 10 ML: at 22:18

## 2024-11-10 RX ADMIN — Medication 10 ML: at 09:01

## 2024-11-10 RX ADMIN — APIXABAN 5 MG: 5 TABLET, FILM COATED ORAL at 09:00

## 2024-11-10 RX ADMIN — LINAGLIPTIN 5 MG: 5 TABLET, FILM COATED ORAL at 09:00

## 2024-11-10 RX ADMIN — APIXABAN 5 MG: 5 TABLET, FILM COATED ORAL at 22:14

## 2024-11-10 RX ADMIN — HYDRALAZINE HYDROCHLORIDE 100 MG: 50 TABLET ORAL at 22:13

## 2024-11-10 RX ADMIN — ATORVASTATIN CALCIUM 20 MG: 20 TABLET, FILM COATED ORAL at 22:13

## 2024-11-10 NOTE — PROGRESS NOTES
Patient Name: Rock Maciel Jr.  Age/Sex: 79 y.o. male  : 1945  MRN: [id number]    Date of Admission: 2024  Date of Encounter Visit: 11/10/24  Encounter Provider: Renny Blandon MD   Place of Service: [facility]    Admit Diagnosis: Persistent atrial fibrillation [I48.19]    Subjective:     No acute events overnight.  No acute complaints.    79-year-old gentleman with history of CABG X2 , aortic valve replacement 2019, paroxysmal A-fib, hypertension, carotid artery disease s/p CEA, diabetes, hyperlipidemia.    Found to be in A-fib on 10/3/2024, started on Eliquis.  Follow-up on  noted he was short of breath and symptomatic.    Admitted for amiodarone and elective cardioversion    Patient has successful cardioversion on 2024.  He continues on amiodarone.          Current Medications:    Current Facility-Administered Medications:     amiodarone (PACERONE) tablet 200 mg, 200 mg, Oral, BID With Meals, Mary Beth Steel APRN, 200 mg at 11/10/24 0900    amLODIPine (NORVASC) tablet 10 mg, 10 mg, Oral, Daily, Jo Toney MD, 10 mg at 11/10/24 0900    apixaban (ELIQUIS) tablet 5 mg, 5 mg, Oral, BID, Jo Toney MD, 5 mg at 11/10/24 0900    atorvastatin (LIPITOR) tablet 20 mg, 20 mg, Oral, Nightly, Jo Toney MD, 20 mg at 24    bumetanide (BUMEX) injection 2 mg, 2 mg, Intravenous, Q12H, Maru Talavera MD    Calcium Replacement - Follow Nurse / BPA Driven Protocol, , Does not apply, PRN, Jo Toney MD    clopidogrel (PLAVIX) tablet 75 mg, 75 mg, Oral, Daily, Jo Toney MD, 75 mg at 11/10/24 0900    glipizide (GLUCOTROL) tablet 5 mg, 5 mg, Oral, BID AC, Jo Toney MD, 5 mg at 11/10/24 0900    hydrALAZINE (APRESOLINE) tablet 100 mg, 100 mg, Oral, TID, Jo Toney MD, 100 mg at 11/10/24 0900    linagliptin (TRADJENTA) tablet 5 mg, 5 mg, Oral, Daily, Dawna,  MD Jo, 5 mg at 11/10/24 0900    [Held by provider] losartan (COZAAR) tablet 75 mg, 75 mg, Oral, Daily, Jo Toney MD    Magnesium Standard Dose Replacement - Follow Nurse / BPA Driven Protocol, , Does not apply, PRN, Jo Toney MD    metoprolol succinate XL (TOPROL-XL) 24 hr tablet 25 mg, 25 mg, Oral, Q24H, Jo Toney MD, 25 mg at 11/10/24 0900    nitroglycerin (NITROSTAT) SL tablet 0.4 mg, 0.4 mg, Sublingual, Q5 Min PRN, Jo Toney MD    Phosphorus Replacement - Follow Nurse / BPA Driven Protocol, , Does not apply, PRN, Jo Toney MD    Potassium Replacement - Follow Nurse / BPA Driven Protocol, , Does not apply, PRN, Jo Toney MD    sodium chloride 0.9 % flush 10 mL, 10 mL, Intravenous, Q12H, Jo Toney MD, 10 mL at 11/10/24 0901    sodium chloride 0.9 % flush 10 mL, 10 mL, Intravenous, PRN, Jo Toney MD    sodium chloride 0.9 % infusion 40 mL, 40 mL, Intravenous, PRN, Jo Toney MD    terazosin (HYTRIN) capsule 5 mg, 5 mg, Oral, Nightly, Jo Toney MD, 5 mg at 11/09/24 2115    Prior to Admission Medications:  Medications Prior to Admission   Medication Sig Dispense Refill Last Dose/Taking    amLODIPine (NORVASC) 10 MG tablet TAKE 1 TABLET BY MOUTH EVERY DAY 90 tablet 1 11/7/2024    apixaban (ELIQUIS) 5 MG tablet tablet Take 1 tablet by mouth 2 (Two) Times a Day. 60 tablet 6 11/7/2024    atorvastatin (LIPITOR) 20 MG tablet Take 1 tablet by mouth Every Night. NEED TO SEE PROVIDER FOR FUTURE REFILLS. 90 tablet 1 11/6/2024    bumetanide (BUMEX) 2 MG tablet Take 1 tablet by mouth Daily. 90 tablet 0 11/7/2024    Cholecalciferol 25 MCG (1000 UT) tablet Take 1 tablet by mouth Daily.   11/7/2024    clopidogrel (PLAVIX) 75 MG tablet Take 1 tablet by mouth Daily. 90 tablet 1 11/6/2024    glipizide (GLUCOTROL) 5 MG tablet TAKE 1 TABLET BY MOUTH 2 TIMES A DAY BEFORE MEALS. 180 tablet 1 11/7/2024     glucose blood (Accu-Chek Keshia Plus) test strip USE TO TEST BLOOD SUGAR    TWICE DAILY FOR DIABETES 100 each 8 11/7/2024    hydrALAZINE (APRESOLINE) 100 MG tablet Take 1 tablet by mouth 3 (Three) Times a Day.   11/7/2024    linagliptin (Tradjenta) 5 MG tablet tablet Take 1 tablet by mouth Daily. 90 tablet 1 Past Week    losartan (COZAAR) 50 MG tablet Take 1.5 tablets by mouth Daily. 135 tablet 1 11/7/2024    metoprolol tartrate (LOPRESSOR) 25 MG tablet Take 1 tablet by mouth 2 (Two) Times a Day. 180 tablet 1 11/7/2024    terazosin (HYTRIN) 5 MG capsule TAKE 1 CAPSULE BY MOUTH EVERY DAY AT BEDTIME FOR 90 DAYS   11/6/2024    vitamin C (ASCORBIC ACID) 250 MG tablet Take 1 tablet by mouth Daily.   11/7/2024    vitamin D (ERGOCALCIFEROL) 1.25 MG (62001 UT) capsule capsule TAKE 1 CAPSULE BY MOUTH ONCE A WEEK FOR 90 DAYS   Past Week    Zinc 50 MG tablet    11/7/2024    metoprolol succinate XL (TOPROL-XL) 25 MG 24 hr tablet Take 1 tablet by mouth.          Past Medical History     Past Medical History:   Diagnosis Date    Allergic rhinitis     Bronchitis     Coronary artery disease     Diabetes mellitus 2001    DM (diabetes mellitus)     Encounter for annual health examination 05/06/2014    Annual Health Assessment    Gout     Hiatal hernia     History of MRSA infection     RIGHT FOOT 2010    Hyperlipidemia     Hypertension     Murmur     Pneumothorax on right     Sleep apnea     pt wears CPAP at night    Wellness examination 06/24/2015    Annual Wellness Visit     Past Surgical History:   Procedure Laterality Date    ARTERY SURGERY Bilateral     carotid    CARDIAC CATHETERIZATION N/A 7/20/2018    Procedure: Left Heart Cath;  Surgeon: Jo Toney MD;  Location: [facility] CATH INVASIVE LOCATION;  Service: Cardiovascular    CARDIAC CATHETERIZATION N/A 7/20/2018    Procedure: Coronary angiography;  Surgeon: Jo Toney MD;  Location: [facility] CATH INVASIVE LOCATION;  Service: Cardiovascular    CAROTID  ENDARTERECTOMY Bilateral     COLONOSCOPY  2008    CORONARY ARTERY BYPASS GRAFT WITH AORTIC VALVE REPAIR/REPLACEMENT N/A 7/23/2018    Procedure: INTRAOPERATIVE ALEX, MIDLINE STERNOTOMY, CORONARY ARTERY BYPASS GRAFTING X 3 USING ENDOSCOPICALLY HARVESTED LEFT GREATER SAPHENOUS VEIN,  AORTIC VALVE REPLACEMENT USING 25MM LOPEZ II ULTRA PORCINE VALVE, PRP;  Surgeon: Phong Posey MD;  Location: [facility] OR;  Service: Cardiothoracic    FOOT SURGERY Right 2010    5th digit removal    FOOT SURGERY Left 2011    1 digit removed    INCISION AND DRAINAGE LEG Right 3/28/2020    Procedure: DEBRIDMENT OF RIGHT CALF;  Surgeon: Ross Pineda MD;  Location: [facility] OR;  Service: Vascular;  Laterality: Right;    QUADRICEPS TENDON REPAIR Left 5/14/2019    Procedure: Left QUADRICEPS TENDON REPAIR;  Surgeon: Camilo Hunter MD;  Location: [facility] OR;  Service: Orthopedics    THORACENTESIS Right 11/21/2016    THORACOSCOPY Right 5/8/2017    Procedure: BRONCHOSCOPY, RIGHT VAT,  TOTAL DECORTICATION RIGHT LUNG, PLEURAL BX, PLACEMENT SUBPLEURAL PAIN CAATHETERS X2;  Surgeon: Donald Orlando III, MD;  Location: [facility] OR;  Service:     TONSILECTOMY, ADENOIDECTOMY, BILATERAL MYRINGOTOMY AND TUBES         Family History     Family History   Problem Relation Age of Onset    Hypertension Father          Social History     Social History     Socioeconomic History    Marital status:     Number of children: 1   Tobacco Use    Smoking status: Never     Passive exposure: Never    Smokeless tobacco: Never   Vaping Use    Vaping status: Never Used   Substance and Sexual Activity    Alcohol use: Yes     Comment: either one glass wine or one glass liquor per  day    Drug use: Never    Sexual activity: Defer       Allergies:  Allergies   Allergen Reactions    Ceclor [Cefaclor] Rash     Rash in his 50s; he tolerated piperacillin-tazobactam in March 2020            Objective:     Objective:  Temp:  [97.5 °F (36.4 °C)-98.2  °F (36.8 °C)] 97.9 °F (36.6 °C)  Heart Rate:  [74-87] 74  Resp:  [16-18] 16  BP: (122-156)/(47-58) 147/54    Intake/Output Summary (Last 24 hours) at 11/10/2024 1310  Last data filed at 11/10/2024 1110  Gross per 24 hour   Intake 1300 ml   Output 1250 ml   Net 50 ml     Body mass index is 38.27 kg/m².      11/09/24  0601 11/10/24  0540   Weight: 132 kg (292 lb 1.6 oz) 132 kg (290 lb)                 Physical Exam:   Gen: Well nourished; Alert  Skin: no visible rashes and no abnormalities with palpation  Eyes: no evidence of visual defects and normal sclera  Ears: no abnormalities.  Mouth: normal teeth and appropriately moist mucous membranes  Chest: clear to auscultation. There is normal respiratory effort and expansion.  CV: examination showed a regular rhythm. S1 and S2 were normal.   Abd: no visible distension.   Neurologic:Cranial nerves appear intact; Sensation normal; no localizing signs    Lab Review:     Results from last 7 days   Lab Units 11/10/24  0801 11/09/24  0419 11/08/24  0514   SODIUM mmol/L 142 142 139   POTASSIUM mmol/L 4.3 4.0 4.0   CHLORIDE mmol/L 104 105 103   CO2 mmol/L 29.1* 28.3 25.6   BUN mg/dL 55* 60* 66*   CREATININE mg/dL 2.19* 2.54* 2.79*   GLUCOSE mg/dL 101* 118* 137*   CALCIUM mg/dL 8.8 8.5* 8.5*       Results from last 7 days   Lab Units 11/08/24  0514   CK TOTAL U/L 39     Results from last 7 days   Lab Units 11/07/24  1145   WBC 10*3/mm3 6.16   HEMOGLOBIN g/dL 9.2*   HEMATOCRIT % 30.2*   PLATELETS 10*3/mm3 200     Results from last 7 days   Lab Units 11/07/24  1145   INR  1.72*   APTT seconds 35.2     Results from last 7 days   Lab Units 11/08/24  0514   CHOLESTEROL mg/dL 81     Results from last 7 days   Lab Units 11/08/24  0514   MAGNESIUM mg/dL 2.2     Results from last 7 days   Lab Units 11/08/24  0514   CHOLESTEROL mg/dL 81   TRIGLYCERIDES mg/dL 52   HDL CHOL mg/dL 34*   LDL CHOL mg/dL 34     Results from last 7 days   Lab Units 11/07/24  1145   PROBNP pg/mL 3,242.0*                Imaging:    chest X-ray    Results for orders placed during the hospital encounter of 10/03/24    Adult Transthoracic Echo Complete W/ Cont if Necessary Per Protocol    Interpretation Summary    Left ventricular ejection fraction appears to be 61 - 65%.    Left ventricular diastolic function was indeterminate.    The left atrial cavity is moderately dilated.    Left atrial volume is moderately increased.    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    Estimated right ventricular systolic pressure from tricuspid regurgitation is moderately elevated (45-55 mmHg).       Stress test 11/8/2023 small sized infarct located in lateral wall with no significant ischemia noted.     Cardiac catheterization 2018 normal left main, minimum diffuse left anterior descending irregularity with midportion 40 to 50% stenosis.  Circumflex artery has OM ostial 70% distal circumflex 60% stenosis.  RCA dominant mid and distal portion 70% stenosis.  Severe AV stenosis.    I personally viewed and interpreted the patient's EKG/Telemetry data.    Assessment/ Plan:     Persistent atrial fibrillation    # Persistent atrial fibrillation, s/p cardioversion.  - Remains in sinus rhythm.  -Continue amiodarone  -Continue Eliquis  - Continue metoprolol    # Concern for diastolic heart failure, vascular congestion.  - S/p Bumex with net -2.8 L  - Continue IV Bumex, likely will need to convert to oral Bumex at discharge.          Renny Blandon MD  [facility]  11/10/24  13:10 EST

## 2024-11-10 NOTE — PROGRESS NOTES
PROGRESS NOTE      Patient Name: Rock Maciel Jr.  : 1945  MRN: [id number]  Primary Care Physician: Denise Dickson APRN  Date of admission: 2024    Patient Care Team:  Denise Dickson APRN as PCP - General (Nurse Practitioner)  Azam Banegas Jr., MD as Consulting Physician (Cardiology)  Jamil Stevens MD as Consulting Physician (Nephrology)        Subjective   Subjective:     Patient seen and examined earlier today, denies any acute complaints.  Swelling getting better.  Review of systems:  No complaints of any fever chills chest pain, some swelling of the legs, no abdominal pain nausea vomiting.      Allergies:    Allergies   Allergen Reactions    Ceclor [Cefaclor] Rash     Rash in his 50s; he tolerated piperacillin-tazobactam in 2020       Objective   Exam:     Vital Signs  Temp:  [97.5 °F (36.4 °C)-98.2 °F (36.8 °C)] 97.5 °F (36.4 °C)  Heart Rate:  [74-87] 84  Resp:  [16-18] 18  BP: (122-147)/(47-58) 146/56  SpO2:  [88 %-99 %] 96 %  on  Flow (L/min) (Oxygen Therapy):  [2] 2;   Device (Oxygen Therapy): CPAP  Body mass index is 38.27 kg/m².    General: Elderly male in no acute distress.    Head:      Normocephalic and atraumatic.    Eyes:      PERRL/EOM intact, conjunctivae and sclerae clear without nystagmus.    Neck:      No masses, thyromegaly,  trachea central with normal respiratory effort   Lungs:    Clear bilaterally to auscultation.    Heart:      Regular rate and rhythm, no murmur no gallop  Abd:        Soft, nontender, not distended, bowel sounds positive, no shifting dullness   Pulses:   Pulses palpable  Extr:        No cyanosis or clubbing--1+ edema.  Bilateral foot surgery both lower extremities dressed  Neuro:    No focal deficits.   alert oriented x3  Skin:       Intact without lesions or rashes.    Psych:    Alert and cooperative; normal mood and affect; .      Results Review:  I have personally reviewed most recent Data :  CBC    Results from last 7 days   Lab  Units 11/07/24  1145   WBC 10*3/mm3 6.16   HEMOGLOBIN g/dL 9.2*   PLATELETS 10*3/mm3 200     CMP   Results from last 7 days   Lab Units 11/10/24  0801 11/09/24  0419 11/08/24  0514 11/07/24  1145   SODIUM mmol/L 142 142 139 139   POTASSIUM mmol/L 4.3 4.0 4.0 4.9   CHLORIDE mmol/L 104 105 103 103   CO2 mmol/L 29.1* 28.3 25.6 25.2   BUN mg/dL 55* 60* 66* 68*   CREATININE mg/dL 2.19* 2.54* 2.79* 3.10*   GLUCOSE mg/dL 101* 118* 137* 250*   ALBUMIN g/dL  --   --  3.5 3.5   BILIRUBIN mg/dL  --   --  0.5 0.6   ALK PHOS U/L  --   --  87 95   AST (SGOT) U/L  --   --  7 10   ALT (SGPT) U/L  --   --  8 8     ABG      XR Chest 1 View    Result Date: 11/9/2024   1. Cardiomegaly with vascular congestion. 2. Ill-defined left midlung opacity. Possible pneumonia. Follow-up to resolution. 3. Subsegmental atelectasis suspected at the bases. 4. Small right pleural effusion 5. Calcified left pleural plaques, may be sequela of prior empyema or hemothorax.  This report was finalized on 11/9/2024 2:25 PM by Dr. Renny Carrion M.D on Workstation: [id number]      US Renal Bilateral    Result Date: 11/8/2024  1.  No right-sided hydronephrosis. Unfortunately a significant portion of the left kidney is obscured by overlying shadowing and acute renal pathology cannot be excluded on the left. Further evaluation with CT abdomen pelvis is recommended to better characterize given patient history 2.  Thickening, trabeculated appearance of the bladder, cannot be evaluated on ultrasound. Attention on above-mentioned CT abdomen pelvis recommended. 3.  Other findings as above.    This report was finalized on 11/8/2024 11:49 PM by Dr. Jermaine Quintero M.D on Workstation: [id number]       Results for orders placed during the hospital encounter of 10/03/24    Adult Transthoracic Echo Complete W/ Cont if Necessary Per Protocol    Interpretation Summary    Left ventricular ejection fraction appears to be 61 - 65%.    Left ventricular diastolic function was  indeterminate.    The left atrial cavity is moderately dilated.    Left atrial volume is moderately increased.    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    Estimated right ventricular systolic pressure from tricuspid regurgitation is moderately elevated (45-55 mmHg).    Scheduled Meds:amiodarone, 200 mg, Oral, BID With Meals  apixaban, 5 mg, Oral, BID  atorvastatin, 20 mg, Oral, Nightly  bumetanide, 2 mg, Intravenous, Q12H  [START ON 11/11/2024] bumetanide, 2 mg, Oral, Daily  clopidogrel, 75 mg, Oral, Daily  glipizide, 5 mg, Oral, BID AC  hydrALAZINE, 100 mg, Oral, TID  linagliptin, 5 mg, Oral, Daily  [Held by provider] losartan, 75 mg, Oral, Daily  metoprolol succinate XL, 25 mg, Oral, Q24H  sodium chloride, 10 mL, Intravenous, Q12H  terazosin, 5 mg, Oral, Nightly      Continuous Infusions:   PRN Meds:  Calcium Replacement - Follow Nurse / BPA Driven Protocol    Magnesium Standard Dose Replacement - Follow Nurse / BPA Driven Protocol    nitroglycerin    Phosphorus Replacement - Follow Nurse / BPA Driven Protocol    Potassium Replacement - Follow Nurse / BPA Driven Protocol    sodium chloride    sodium chloride    Assessment & Plan   Assessment and Plan:         Persistent atrial fibrillation    ASSESSMENT:  Acute kidney injury  Baseline CKD stage III  Volume overload  Atrial fibrillation  Coronary artery disease status post bypass surgery  Anemia  Status post AVR 2019  History of hypertension  Echocardiogram October 2024 ejection fraction 61 to 65%  Bilateral feet surgery    Plan    Renal function is improving  Acute kidney injury most likely hemodynamic mediated cardiorenal etiology, prerenal with volume overload  Baseline creatinine around 1.4 to 1.7 mg/dL, admission creatinine 3.1 mg/dL, creatinine improved  Increase Bumex to 2 mg IV every 8 hours  Was on losartan at home, will hold for now  Hold amlodipine to avoid hypotension and has edema  Low-grade proteinuria continue to monitor  Renal ultrasound,  limited study, left kidney obscured by overlying shadowing, trabeculated appearance of the bladder, will check CT scan without contrast  Discussed with patient's nurse  Followed by cardiology, valuation noted  Overall seems like gradually improving   Will continue to follow      Electronically signed by Maru Talavera MD,   [facility] kidney consultant  [phone]  11/10/2024  14:36 EST

## 2024-11-10 NOTE — PLAN OF CARE
Goal Outcome Evaluation:  Plan of Care Reviewed With: patient   Patient currently on 3L NC. Attempted to ween patient down and was unsuccessful. MD notified. IS implemented. Chest x ray ordered for tomorrow morning. Patient has been in NS rhythm. No longer using accessory muscles for breathing efforts. Patient states he feels better overall today. Alert and oriented. VSS. Safety maintained.

## 2024-11-10 NOTE — PLAN OF CARE
Goal Outcome Evaluation:  Plan of Care Reviewed With: patient           Outcome Evaluation: Pt is an 78 yo male adm with afib and SOA, had a cardioversion and is in NSR. Pt lives alone and is independent with a rolling walker, he wears special built up shoes, has had removal of toes B. Pt is not on o2 at baseline, he has been requiring o2 in the hospital and is on 3L. Pt trialed walking 30 ft without o2 and his o2 sats dropped to 78%, recovered to 94% with replacement of 02 and rest. Pt then ambulated another 30 ft with o2 at 3L, O2 sats dropped to 85%. Pt was able to stand with stand by assist, and walk 30 ft x 2 with a rolling walker with SBA/CGA. He has a unique gait pattern with toe out gait, B LE externally rotated, he has a standard rolling walker at home and he is bumping his feet on the back legs of the walker, pt would benefit from a bariatric walker at discharge, PT will cont to follow    Anticipated Discharge Disposition (PT): home

## 2024-11-10 NOTE — PLAN OF CARE
Goal Outcome Evaluation:              Outcome Evaluation: HAS REMAINED IN NSR. WEARS CPAP WITH 2 L/M WHILE SLEEPING AND DESATS AT TIMES WHILE SLEEPING.

## 2024-11-10 NOTE — THERAPY EVALUATION
Patient Name: Rock Maciel Jr.  : 1945    MRN: [id number]                              Today's Date: 11/10/2024       Admit Date: 2024    Visit Dx:     ICD-10-CM ICD-9-CM   1. Atrial fibrillation, persistent  I48.19 427.31     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    Chronic venous insufficiency    Nonrheumatic aortic valve stenosis    Carotid stenosis, asymptomatic    Bradycardia    Hyperlipidemia    Bilateral carotid artery disease    Abnormal cardiovascular stress test    H/O aortic valve replacement with porcine valve    Charcot-Davida-Tooth disease-like deformity of foot    Amputated toe of right foot    Fall    Non-traumatic rhabdomyolysis    S/P CABG x 2    CKD (chronic kidney disease) stage 3, GFR 30-59 ml/min    Rupture of left quadriceps tendon    Constipation    Wound of right leg    Anemia    Chronic diastolic (congestive) heart failure    Amputated toe of left foot    Gas gangrene    Cellulitis of left lower limb    Acquired absence of left foot    Bilateral lower extremity edema    CKD (chronic kidney disease) stage 3, GFR 30-59 ml/min    History of pneumonia    Atelectasis of both lungs    Abnormal pleural fluid    Pleural thickening    Atrial fibrillation, persistent    Chronic anticoagulation    Persistent atrial fibrillation     Past Medical History:   Diagnosis Date    Allergic rhinitis     Bronchitis     Coronary artery disease     Diabetes mellitus     DM (diabetes mellitus)     Encounter for annual health examination 2014    Annual Health Assessment    Gout     Hiatal  hernia     History of MRSA infection     RIGHT FOOT 2010    Hyperlipidemia     Hypertension     Murmur     Pneumothorax on right     Sleep apnea     pt wears CPAP at night    Wellness examination 06/24/2015    Annual Wellness Visit     Past Surgical History:   Procedure Laterality Date    ARTERY SURGERY Bilateral     carotid    CARDIAC CATHETERIZATION N/A 7/20/2018    Procedure: Left Heart Cath;  Surgeon: Jo Toney MD;  Location: [facility] CATH INVASIVE LOCATION;  Service: Cardiovascular    CARDIAC CATHETERIZATION N/A 7/20/2018    Procedure: Coronary angiography;  Surgeon: Jo Toney MD;  Location: [facility] CATH INVASIVE LOCATION;  Service: Cardiovascular    CAROTID ENDARTERECTOMY Bilateral     COLONOSCOPY  2008    CORONARY ARTERY BYPASS GRAFT WITH AORTIC VALVE REPAIR/REPLACEMENT N/A 7/23/2018    Procedure: INTRAOPERATIVE ALEX, MIDLINE STERNOTOMY, CORONARY ARTERY BYPASS GRAFTING X 3 USING ENDOSCOPICALLY HARVESTED LEFT GREATER SAPHENOUS VEIN,  AORTIC VALVE REPLACEMENT USING 25MM LOPEZ II ULTRA PORCINE VALVE, PRP;  Surgeon: Phong Posey MD;  Location: [facility];  Service: Cardiothoracic    FOOT SURGERY Right 2010    5th digit removal    FOOT SURGERY Left 2011    1 digit removed    INCISION AND DRAINAGE LEG Right 3/28/2020    Procedure: DEBRIDMENT OF RIGHT CALF;  Surgeon: Ross Pineda MD;  Location: [facility] OR;  Service: Vascular;  Laterality: Right;    QUADRICEPS TENDON REPAIR Left 5/14/2019    Procedure: Left QUADRICEPS TENDON REPAIR;  Surgeon: Camilo Hunter MD;  Location: [facility] OR;  Service: Orthopedics    THORACENTESIS Right 11/21/2016    THORACOSCOPY Right 5/8/2017    Procedure: BRONCHOSCOPY, RIGHT VAT,  TOTAL DECORTICATION RIGHT LUNG, PLEURAL BX, PLACEMENT SUBPLEURAL PAIN CAATHETERS X2;  Surgeon: Donald Orlando III, MD;  Location: [facility];  Service:     TONSILECTOMY, ADENOIDECTOMY, BILATERAL MYRINGOTOMY AND TUBES        General Information       Row  Name 11/10/24 1336          Physical Therapy Time and Intention    Document Type evaluation  -PC     Mode of Treatment physical therapy  -PC       Row Name 11/10/24 1336          General Information    Patient Profile Reviewed yes  -PC     Prior Level of Function independent:  -PC     Barriers to Rehab medically complex  -PC       Row Name 11/10/24 1336          Living Environment    People in Home alone  -PC       Row Name 11/10/24 1336          Cognition    Orientation Status (Cognition) oriented x 4  -PC               User Key  (r) = Recorded By, (t) = Taken By, (c) = Cosigned By      Initials Name Provider Type    PC Marce Hall, PT Physical Therapist                   Mobility       Row Name 11/10/24 1337          Bed Mobility    Comment, (Bed Mobility) in chair  -PC       Row Name 11/10/24 1337          Sit-Stand Transfer    Sit-Stand [place] (Transfers) standby assist  -PC     Assistive Device (Sit-Stand Transfers) walker, front-wheeled  -PC       Row Name 11/10/24 1337          Gait/Stairs (Locomotion)    [place] Level (Gait) contact guard;standby assist  -PC     Assistive Device (Gait) walker, front-wheeled  -PC     Distance in Feet (Gait) 30  30 ft X 2  -PC     Deviations/Abnormal Patterns (Gait) bilateral deviations;base of support, wide;crispin decreased;gait speed decreased;stride length decreased  -PC     Bilateral Gait Deviations heel strike decreased  -PC     Comment, (Gait/Stairs) walks with B LE externally rotated, has special built up shoes, L foot partial amputation  -PC               User Key  (r) = Recorded By, (t) = Taken By, (c) = Cosigned By      Initials Name Provider Type    PC Marce Hall PT Physical Therapist                   Obj/Interventions       Row Name 11/10/24 1341          Range of Motion Comprehensive    General Range of Motion no range of motion deficits identified  -PC       Row Name 11/10/24 1341          Strength Comprehensive (MMT)    Comment, General  Manual Muscle Testing (MMT) Assessment general weakness from deconditioning  -       Row Name 11/10/24 1341          Balance    Balance Assessment sitting static balance;standing static balance;standing dynamic balance  -PC     Static Sitting Balance supervision  -PC     Position, Sitting Balance sitting edge of bed  -PC     Static Standing Balance standby assist  -PC     Dynamic Standing Balance contact guard;standby assist  -PC     Position/Device Used, Standing Balance walker, rolling  -PC               User Key  (r) = Recorded By, (t) = Taken By, (c) = Cosigned By      Initials Name Provider Type    PC Marce Hall PT Physical Therapist                   Goals/Plan       Row Name 11/10/24 1347          Gait Training Goal 1 (PT)    Activity/Assistive Device (Gait Training Goal 1, PT) gait (walking locomotion);assistive device use;increase endurance/gait distance;diminish gait deviation  -PC     [place] Level (Gait Training Goal 1, PT) standby assist  -PC     Distance (Gait Training Goal 1, PT) 40  -PC     Time Frame (Gait Training Goal 1, PT) 1 week  -       Row Name 11/10/24 134          Therapy Assessment/Plan (PT)    Planned Therapy Interventions (PT) bed mobility training;gait training;strengthening;transfer training  -               User Key  (r) = Recorded By, (t) = Taken By, (c) = Cosigned By      Initials Name Provider Type    PC Marce Hall PT Physical Therapist                   Clinical Impression       Row Name 11/10/24 1347          Pain    Pretreatment Pain Rating 0/10 - no pain  -PC     Posttreatment Pain Rating 0/10 - no pain  -PC       Row Name 11/10/24 1342          Plan of Care Review    Plan of Care Reviewed With patient  -PC     Outcome Evaluation Pt is an 80 yo male adm with afib and SOA, had a cardioversion and is in NSR. Pt lives alone and is independent with a rolling walker, he wears special built up shoes, has had removal of toes B. Pt is not on o2 at baseline, he  has been requiring o2 in the hospital and is on 3L. Pt trialed walking 30 ft without o2 and his o2 sats dropped to 78%, recovered to 94% with replacement of 02 and rest. Pt then ambulated another 30 ft with o2 at 3L, O2 sats dropped to 85%. Pt was able to stand with stand by assist, and walk 30 ft x 2 with a rolling walker with SBA/CGA. He has a unique gait pattern with toe out gait, B LE externally rotated, he has a standard rolling walker at home and he is bumping his feet on the back legs of the walker, pt would benefit from a bariatric walker at discharge, PT will cont to follow  -PC       Row Name 11/10/24 1342          Therapy Assessment/Plan (PT)    Rehab Potential (PT) good  -PC     Criteria for Skilled Interventions Met (PT) yes;meets criteria  -PC     Therapy Frequency (PT) 5 times/wk  -PC       Row Name 11/10/24 1342          Vital Signs    Pre SpO2 (%) 94  -PC     O2 Delivery Pre Treatment supplemental O2  -PC     Intra SpO2 (%) 78  -PC     O2 Delivery Intra Treatment room air  -PC     Post SpO2 (%) 90  -PC     O2 Delivery Post Treatment supplemental O2  -PC       Row Name 11/10/24 1342          Positioning and Restraints    Pre-Treatment Position sitting in chair/recliner  -PC     Post Treatment Position chair  -PC     In Chair reclined;call light within reach;encouraged to call for assist;exit alarm on  -PC               User Key  (r) = Recorded By, (t) = Taken By, (c) = Cosigned By      Initials Name Provider Type    PC Marce Hall, PT Physical Therapist                   Outcome Measures       Row Name 11/10/24 1350          How much help from another person do you currently need...    Turning from your back to your side while in flat bed without using bedrails? 4  -PC     Moving from lying on back to sitting on the side of a flat bed without bedrails? 4  -PC     Moving to and from a bed to a chair (including a wheelchair)? 4  -PC     Standing up from a chair using your arms (e.g., wheelchair,  bedside chair)? 4  -PC     Climbing 3-5 steps with a railing? 3  -PC     To walk in hospital room? 3  -PC     AM-PAC 6 Clicks Score (PT) 22  -PC               User Key  (r) = Recorded By, (t) = Taken By, (c) = Cosigned By      Initials Name Provider Type    PC Marce Hall PT Physical Therapist                                 Physical Therapy Education       Title: PT OT SLP Therapies (In Progress)       Topic: Physical Therapy (Done)       Point: Mobility training (Done)       Learning Progress Summary            Patient Acceptance, E,D, DU by PC at 11/10/2024 1350                      Point: Home exercise program (Done)       Learning Progress Summary            Patient Acceptance, E,D, DU by PC at 11/10/2024 1350                      Point: Body mechanics (Done)       Learning Progress Summary            Patient Acceptance, E,D, DU by PC at 11/10/2024 1350                      Point: Precautions (Done)       Learning Progress Summary            Patient Acceptance, E,D, DU by PC at 11/10/2024 1350                                      User Key       Initials Effective Dates Name Provider Type Discipline     06/16/21 -  Marce Hall PT Physical Therapist PT                  PT Recommendation and Plan  Planned Therapy Interventions (PT): bed mobility training, gait training, strengthening, transfer training  Outcome Evaluation: Pt is an 80 yo male adm with afib and SOA, had a cardioversion and is in NSR. Pt lives alone and is independent with a rolling walker, he wears special built up shoes, has had removal of toes B. Pt is not on o2 at baseline, he has been requiring o2 in the hospital and is on 3L. Pt trialed walking 30 ft without o2 and his o2 sats dropped to 78%, recovered to 94% with replacement of 02 and rest. Pt then ambulated another 30 ft with o2 at 3L, O2 sats dropped to 85%. Pt was able to stand with stand by assist, and walk 30 ft x 2 with a rolling walker with SBA/CGA. He has a unique gait  pattern with toe out gait, B LE externally rotated, he has a standard rolling walker at home and he is bumping his feet on the back legs of the walker, pt would benefit from a bariatric walker at discharge, PT will cont to follow     Time Calculation:         PT Charges       Row Name 11/10/24 1351             Time Calculation    Start Time 1310  -PC      Stop Time 1331  -PC      Time Calculation (min) 21 min  -PC      PT Received On 11/10/24  -PC      PT - Next Appointment 11/11/24  -PC      PT Goal Re-Cert Due Date 11/17/24  -PC                User Key  (r) = Recorded By, (t) = Taken By, (c) = Cosigned By      Initials Name Provider Type    PC Marce Hall, PT Physical Therapist                  Therapy Charges for Today       Code Description Service Date Service Provider Modifiers Qty    07768214378 HC PT EVAL MOD COMPLEXITY 3 11/10/2024 Marce Hall, PT GP 1    29514288834 HC PT THER PROC EA 15 MIN 11/10/2024 Marce Hall, PT GP 1            PT G-Codes  AM-PAC 6 Clicks Score (PT): 22  PT Discharge Summary  Anticipated Discharge Disposition (PT): home    Marce Hall PT  11/10/2024

## 2024-11-11 ENCOUNTER — TELEPHONE (OUTPATIENT)
Dept: FAMILY MEDICINE CLINIC | Facility: CLINIC | Age: 79
End: 2024-11-11

## 2024-11-11 ENCOUNTER — APPOINTMENT (OUTPATIENT)
Dept: CT IMAGING | Facility: HOSPITAL | Age: 79
DRG: 309 | End: 2024-11-11
Payer: MEDICARE

## 2024-11-11 ENCOUNTER — TELEPHONE (OUTPATIENT)
Dept: FAMILY MEDICINE CLINIC | Facility: CLINIC | Age: 79
End: 2024-11-11
Payer: MEDICARE

## 2024-11-11 LAB
ANION GAP SERPL CALCULATED.3IONS-SCNC: 11.1 MMOL/L (ref 5–15)
BUN SERPL-MCNC: 59 MG/DL (ref 8–23)
BUN/CREAT SERPL: 25.2 (ref 7–25)
CALCIUM SPEC-SCNC: 9.1 MG/DL (ref 8.6–10.5)
CHLORIDE SERPL-SCNC: 102 MMOL/L (ref 98–107)
CO2 SERPL-SCNC: 29.9 MMOL/L (ref 22–29)
CREAT SERPL-MCNC: 2.34 MG/DL (ref 0.76–1.27)
EGFRCR SERPLBLD CKD-EPI 2021: 27.4 ML/MIN/1.73
GLUCOSE BLDC GLUCOMTR-MCNC: 151 MG/DL (ref 70–130)
GLUCOSE BLDC GLUCOMTR-MCNC: 152 MG/DL (ref 70–130)
GLUCOSE BLDC GLUCOMTR-MCNC: 154 MG/DL (ref 70–130)
GLUCOSE BLDC GLUCOMTR-MCNC: 81 MG/DL (ref 70–130)
GLUCOSE SERPL-MCNC: 70 MG/DL (ref 65–99)
POTASSIUM SERPL-SCNC: 4.2 MMOL/L (ref 3.5–5.2)
QT INTERVAL: 442 MS
QTC INTERVAL: 510 MS
SODIUM SERPL-SCNC: 143 MMOL/L (ref 136–145)

## 2024-11-11 PROCEDURE — 97110 THERAPEUTIC EXERCISES: CPT

## 2024-11-11 PROCEDURE — 97116 GAIT TRAINING THERAPY: CPT

## 2024-11-11 PROCEDURE — 74176 CT ABD & PELVIS W/O CONTRAST: CPT

## 2024-11-11 PROCEDURE — 25010000002 BUMETANIDE PER 0.5 MG: Performed by: INTERNAL MEDICINE

## 2024-11-11 PROCEDURE — 80048 BASIC METABOLIC PNL TOTAL CA: CPT

## 2024-11-11 PROCEDURE — 93010 ELECTROCARDIOGRAM REPORT: CPT | Performed by: INTERNAL MEDICINE

## 2024-11-11 PROCEDURE — 82948 REAGENT STRIP/BLOOD GLUCOSE: CPT

## 2024-11-11 PROCEDURE — 93005 ELECTROCARDIOGRAM TRACING: CPT

## 2024-11-11 PROCEDURE — 99232 SBSQ HOSP IP/OBS MODERATE 35: CPT

## 2024-11-11 RX ORDER — BUMETANIDE 2 MG/1
1 TABLET ORAL
Status: DISCONTINUED | OUTPATIENT
Start: 2024-11-11 | End: 2024-11-15 | Stop reason: HOSPADM

## 2024-11-11 RX ADMIN — HYDRALAZINE HYDROCHLORIDE 100 MG: 50 TABLET ORAL at 17:17

## 2024-11-11 RX ADMIN — LINAGLIPTIN 5 MG: 5 TABLET, FILM COATED ORAL at 09:21

## 2024-11-11 RX ADMIN — CLOPIDOGREL BISULFATE 75 MG: 75 TABLET ORAL at 09:20

## 2024-11-11 RX ADMIN — HYDRALAZINE HYDROCHLORIDE 100 MG: 50 TABLET ORAL at 09:20

## 2024-11-11 RX ADMIN — BUMETANIDE 1 MG: 2 TABLET ORAL at 17:17

## 2024-11-11 RX ADMIN — GLIPIZIDE 5 MG: 5 TABLET ORAL at 17:06

## 2024-11-11 RX ADMIN — ATORVASTATIN CALCIUM 20 MG: 20 TABLET, FILM COATED ORAL at 21:38

## 2024-11-11 RX ADMIN — TERAZOSIN HYDROCHLORIDE 5 MG: 5 CAPSULE ORAL at 21:38

## 2024-11-11 RX ADMIN — BUMETANIDE 2 MG: 0.25 INJECTION INTRAMUSCULAR; INTRAVENOUS at 00:22

## 2024-11-11 RX ADMIN — Medication 10 ML: at 09:21

## 2024-11-11 RX ADMIN — AMIODARONE HYDROCHLORIDE 200 MG: 200 TABLET ORAL at 09:21

## 2024-11-11 RX ADMIN — APIXABAN 5 MG: 5 TABLET, FILM COATED ORAL at 21:38

## 2024-11-11 RX ADMIN — Medication 10 ML: at 21:39

## 2024-11-11 RX ADMIN — GLIPIZIDE 5 MG: 5 TABLET ORAL at 06:36

## 2024-11-11 RX ADMIN — AMIODARONE HYDROCHLORIDE 200 MG: 200 TABLET ORAL at 17:06

## 2024-11-11 RX ADMIN — METOPROLOL SUCCINATE 25 MG: 25 TABLET, EXTENDED RELEASE ORAL at 09:21

## 2024-11-11 RX ADMIN — APIXABAN 5 MG: 5 TABLET, FILM COATED ORAL at 09:20

## 2024-11-11 RX ADMIN — HYDRALAZINE HYDROCHLORIDE 100 MG: 50 TABLET ORAL at 21:38

## 2024-11-11 RX ADMIN — BUMETANIDE 2 MG: 0.25 INJECTION INTRAMUSCULAR; INTRAVENOUS at 06:36

## 2024-11-11 NOTE — PLAN OF CARE
Goal Outcome Evaluation:  Plan of Care Reviewed With: patient        Progress: improving  Outcome Evaluation: Pt tolerated treatment well this date. Required SBA to stand from chair, then CGA to ambulate for safety. Pt ambulated 75ft w/ Rw, no LOBs. Limited d/t fatigue and SOA. After seated rest, pt completed several LE exercises, and encouraged pt to continue a few on own during the day.    Anticipated Discharge Disposition (PT): home with home health

## 2024-11-11 NOTE — PROGRESS NOTES
PROGRESS NOTE      Patient Name: Rock Maciel Jr.  : 1945  MRN: [id number]  Primary Care Physician: Denise Dickson APRN  Date of admission: 2024    Patient Care Team:  Denise Dickson APRN as PCP - General (Nurse Practitioner)  Azam Banegas Jr., MD as Consulting Physician (Cardiology)  Jamil Stevens MD as Consulting Physician (Nephrology)        Subjective   Subjective:     Patient seen and examined earlier today, denies any acute complaints.  Swelling getting better.  Review of systems:  No complaints of any fever chills chest pain, some swelling of the legs, no abdominal pain nausea vomiting.      Allergies:    Allergies   Allergen Reactions    Ceclor [Cefaclor] Rash     Rash in his 50s; he tolerated piperacillin-tazobactam in 2020       Objective   Exam:     Vital Signs  Temp:  [97.5 °F (36.4 °C)-98.1 °F (36.7 °C)] 97.7 °F (36.5 °C)  Heart Rate:  [66-93] 85  Resp:  [17-18] 17  BP: (120-146)/(46-71) 128/65  SpO2:  [85 %-97 %] 94 %  on  Flow (L/min) (Oxygen Therapy):  [2-3] 2;   Device (Oxygen Therapy): nasal cannula  Body mass index is 39.06 kg/m².    General: Elderly male in no acute distress.    Head:      Normocephalic and atraumatic.    Eyes:      PERRL/EOM intact, conjunctivae and sclerae clear without nystagmus.    Neck:      No masses, thyromegaly,  trachea central with normal respiratory effort   Lungs:    Clear bilaterally to auscultation.    Heart:      Regular rate and rhythm, no murmur no gallop  Abd:        Soft, nontender, not distended, bowel sounds positive, no shifting dullness   Pulses:   Pulses palpable  Extr:        No cyanosis or clubbing--1+ edema.  Bilateral foot surgery both lower extremities dressed  Neuro:    No focal deficits.   alert oriented x3  Skin:       Intact without lesions or rashes.    Psych:    Alert and cooperative; normal mood and affect; .      Results Review:  I have personally reviewed most recent Data :  CBC    Results from last 7  days   Lab Units 11/07/24  1145   WBC 10*3/mm3 6.16   HEMOGLOBIN g/dL 9.2*   PLATELETS 10*3/mm3 200     CMP   Results from last 7 days   Lab Units 11/11/24  0602 11/10/24  0801 11/09/24  0419 11/08/24  0514 11/07/24  1145   SODIUM mmol/L 143 142 142 139 139   POTASSIUM mmol/L 4.2 4.3 4.0 4.0 4.9   CHLORIDE mmol/L 102 104 105 103 103   CO2 mmol/L 29.9* 29.1* 28.3 25.6 25.2   BUN mg/dL 59* 55* 60* 66* 68*   CREATININE mg/dL 2.34* 2.19* 2.54* 2.79* 3.10*   GLUCOSE mg/dL 70 101* 118* 137* 250*   ALBUMIN g/dL  --   --   --  3.5 3.5   BILIRUBIN mg/dL  --   --   --  0.5 0.6   ALK PHOS U/L  --   --   --  87 95   AST (SGOT) U/L  --   --   --  7 10   ALT (SGPT) U/L  --   --   --  8 8     ABG      XR Chest 1 View    Result Date: 11/9/2024   1. Cardiomegaly with vascular congestion. 2. Ill-defined left midlung opacity. Possible pneumonia. Follow-up to resolution. 3. Subsegmental atelectasis suspected at the bases. 4. Small right pleural effusion 5. Calcified left pleural plaques, may be sequela of prior empyema or hemothorax.  This report was finalized on 11/9/2024 2:25 PM by Dr. Renny Carrion M.D on Workstation: [id number]       Results for orders placed during the hospital encounter of 10/03/24    Adult Transthoracic Echo Complete W/ Cont if Necessary Per Protocol    Interpretation Summary    Left ventricular ejection fraction appears to be 61 - 65%.    Left ventricular diastolic function was indeterminate.    The left atrial cavity is moderately dilated.    Left atrial volume is moderately increased.    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    Estimated right ventricular systolic pressure from tricuspid regurgitation is moderately elevated (45-55 mmHg).    Scheduled Meds:amiodarone, 200 mg, Oral, BID With Meals  apixaban, 5 mg, Oral, BID  atorvastatin, 20 mg, Oral, Nightly  bumetanide, 2 mg, Intravenous, Q8H  clopidogrel, 75 mg, Oral, Daily  glipizide, 5 mg, Oral, BID AC  hydrALAZINE, 100 mg, Oral,  TID  linagliptin, 5 mg, Oral, Daily  [Held by provider] losartan, 75 mg, Oral, Daily  metoprolol succinate XL, 25 mg, Oral, Q24H  sodium chloride, 10 mL, Intravenous, Q12H  terazosin, 5 mg, Oral, Nightly      Continuous Infusions:   PRN Meds:  Calcium Replacement - Follow Nurse / BPA Driven Protocol    Magnesium Standard Dose Replacement - Follow Nurse / BPA Driven Protocol    nitroglycerin    Phosphorus Replacement - Follow Nurse / BPA Driven Protocol    Potassium Replacement - Follow Nurse / BPA Driven Protocol    sodium chloride    sodium chloride    Assessment & Plan   Assessment and Plan:         Persistent atrial fibrillation    ASSESSMENT:  Acute kidney injury  Baseline CKD stage III  Volume overload  Atrial fibrillation  Coronary artery disease status post bypass surgery  Anemia  Status post AVR 2019  History of hypertension  Echocardiogram October 2024 ejection fraction 61 to 65%  Bilateral feet surgery    Plan    Renal function is slightly worsened likely labile with volume, sCr 2.34  Acute kidney injury most likely hemodynamic mediated cardiorenal etiology, prerenal with volume overload  Baseline creatinine around 1.4 to 1.7 mg/dL, admission creatinine 3.1 mg/dL, creatinine overall improved  Decreasing Bumex to 2 mg p.o. twice a day as patient is little bit more alkalotic with slight  increase in the sodium  Most likely angiotensin receptor blocker can be started at the time of discharge  Continue to hold amlodipine to avoid hypotension and has edema  Low-grade proteinuria continue to monitor  Renal ultrasound, limited study, left kidney obscured by overlying shadowing, trabeculated appearance of the bladder  CT AP WO shows slight increase in left renal cyst, cannot exclude RCC, bladder trabeculated with multiple diverticula, mod left inguinal hernia  Need to see urologist for for possibility of RCC can be arranged as an outpatient  Discussed with patient's nurse  Followed by cardiology, valuation  noted  Overall seems like gradually improving   Will continue to follow    Note transcribed for Dr Lerner      Electronically signed by ZEINA Story [facility] consultant  [phone]  11/11/2024  12:15 EST

## 2024-11-11 NOTE — PLAN OF CARE
Goal Outcome Evaluation:  Plan of Care Reviewed With: patient        Progress: improving  Outcome Evaluation: pt is axox4. cpap when sleeping. 2 L nc. denies SOA. iv bumex given. using the urinal. remains in NSR with BBB and 1st degree AVB. VSS.

## 2024-11-11 NOTE — TELEPHONE ENCOUNTER
Provider attempted to call patient back. He said he was in the hospital and wanted to talk to provider while he is in the hospital. Unfortunately, it is not authorized for provider to schedule a visit while patient is in the hospital. Patient must come in to his scheduled appt for provider to complete a diabetic foot exam as stated in the form. Provider will complete paperwork upon appt.     Hub to relay:

## 2024-11-11 NOTE — TELEPHONE ENCOUNTER
Patient is really needing the paperwork completed. He is in the hospital right now. I did let patient know that provider advise coming in but patient wants to do a telehealth visit today instead. He also said he will have the nurse at hospital look at his foot as well. Please advise. Thanks

## 2024-11-11 NOTE — PLAN OF CARE
Goal Outcome Evaluation:  Plan of Care Reviewed With: patient        Progress: improving  Outcome Evaluation: VSS; no complaints of pain or discomfort; CT of abdomen/pelvis done early this morning; oral bumex given this evening; pt sat up in chair most of the day; legs wrapped; monitor Is and Os; safety maintained

## 2024-11-11 NOTE — PROGRESS NOTES
Nutrition Services    Patient Name:  Rock Maciel Jr.  YOB: 1945  MRN: [id number]  Admit Date:  11/7/2024    Assessment Date:  11/11/24    NUTRITION SCREENING      Reason for Encounter Follow-up protocol, MST score 2+   Diagnosis/Problem 80yo male adm for A Fib       PO Diet Diet: Cardiac; Healthy Heart (2-3 Na+); Fluid Consistency: Thin (IDDSI 0)   Supplements    PO Intake % 100%       Medications MAR reviewed by RD   Labs  Listed below, reviewed   Physical Findings Patient in bedside chair, reports stable weight, no problems chewing or swallowing, no needs identified   GI Function WDL   Skin Status No breakdown noted       Height  Weight  BMI  Weight Trend        Weight: 134 kg (296 lb) (11/11/24 0347)  Body mass index is 39.06 kg/m².  Stable       Nutrition Problem (PES) No nutrition diagnosis at this time.       Intervention/Plan Available as needed.    RD to follow up per protocol.     Results from last 7 days   Lab Units 11/11/24  0602 11/10/24  0801 11/09/24  0419 11/08/24  0514 11/07/24  1145   SODIUM mmol/L 143 142 142 139 139   POTASSIUM mmol/L 4.2 4.3 4.0 4.0 4.9   CHLORIDE mmol/L 102 104 105 103 103   CO2 mmol/L 29.9* 29.1* 28.3 25.6 25.2   BUN mg/dL 59* 55* 60* 66* 68*   CREATININE mg/dL 2.34* 2.19* 2.54* 2.79* 3.10*   CALCIUM mg/dL 9.1 8.8 8.5* 8.5* 9.1   BILIRUBIN mg/dL  --   --   --  0.5 0.6   ALK PHOS U/L  --   --   --  87 95   ALT (SGPT) U/L  --   --   --  8 8   AST (SGOT) U/L  --   --   --  7 10   GLUCOSE mg/dL 70 101* 118* 137* 250*     Results from last 7 days   Lab Units 11/08/24  0514 11/07/24  1145   MAGNESIUM mg/dL 2.2  --    PHOSPHORUS mg/dL 4.7*  --    HEMOGLOBIN g/dL  --  9.2*   HEMATOCRIT %  --  30.2*   TRIGLYCERIDES mg/dL 52  --      Lab Results   Component Value Date    HGBA1C 6.50 (H) 03/05/2024         Electronically signed by:  Hemanth Atkinson RD  11/11/24 15:04 EST

## 2024-11-11 NOTE — PROGRESS NOTES
[facility]  Cardiology Progress Note    Patient Identification:  Name: Rock Maciel Jr.  Age: 79 y.o.  Sex: male  :  1945  MRN: [id number]                 Follow Up / Chief Complaint: follow up for afib    Interval History: resting in bed, no chest pain or shortness of breath. His le swelling has significantly improved.      Objective:    Past Medical History:  Past Medical History:   Diagnosis Date    Allergic rhinitis     Bronchitis     Coronary artery disease     Diabetes mellitus     DM (diabetes mellitus)     Encounter for annual health examination 2014    Annual Health Assessment    Gout     Hiatal hernia     History of MRSA infection     RIGHT FOOT     Hyperlipidemia     Hypertension     Murmur     Pneumothorax on right     Sleep apnea     pt wears CPAP at night    Wellness examination 2015    Annual Wellness Visit     Past Surgical History:  Past Surgical History:   Procedure Laterality Date    ARTERY SURGERY Bilateral     carotid    CARDIAC CATHETERIZATION N/A 2018    Procedure: Left Heart Cath;  Surgeon: Jo Toney MD;  Location: [facility] CATH INVASIVE LOCATION;  Service: Cardiovascular    CARDIAC CATHETERIZATION N/A 2018    Procedure: Coronary angiography;  Surgeon: Jo Toney MD;  Location: [facility] CATH INVASIVE LOCATION;  Service: Cardiovascular    CAROTID ENDARTERECTOMY Bilateral     COLONOSCOPY      CORONARY ARTERY BYPASS GRAFT WITH AORTIC VALVE REPAIR/REPLACEMENT N/A 2018    Procedure: INTRAOPERATIVE ALEX, MIDLINE STERNOTOMY, CORONARY ARTERY BYPASS GRAFTING X 3 USING ENDOSCOPICALLY HARVESTED LEFT GREATER SAPHENOUS VEIN,  AORTIC VALVE REPLACEMENT USING 25MM LOPEZ II ULTRA PORCINE VALVE, PRP;  Surgeon: Phong Posey MD;  Location: [facility] OR;  Service: Cardiothoracic    FOOT SURGERY Right     5th digit removal    FOOT SURGERY Left     1 digit removed    INCISION AND DRAINAGE LEG Right 3/28/2020     Procedure: DEBRIDMENT OF RIGHT CALF;  Surgeon: Ross Pineda MD;  Location: [facility];  Service: Vascular;  Laterality: Right;    QUADRICEPS TENDON REPAIR Left 5/14/2019    Procedure: Left QUADRICEPS TENDON REPAIR;  Surgeon: Camilo Hunter MD;  Location: [facility] OR;  Service: Orthopedics    THORACENTESIS Right 11/21/2016    THORACOSCOPY Right 5/8/2017    Procedure: BRONCHOSCOPY, RIGHT VAT,  TOTAL DECORTICATION RIGHT LUNG, PLEURAL BX, PLACEMENT SUBPLEURAL PAIN CAATHETERS X2;  Surgeon: Donald Orlando III, MD;  Location: [facility];  Service:     TONSILECTOMY, ADENOIDECTOMY, BILATERAL MYRINGOTOMY AND TUBES          Social History:   Social History     Tobacco Use    Smoking status: Never     Passive exposure: Never    Smokeless tobacco: Never   Substance Use Topics    Alcohol use: Yes     Comment: either one glass wine or one glass liquor per  day      Family History:  Family History   Problem Relation Age of Onset    Hypertension Father           Allergies:  Allergies   Allergen Reactions    Ceclor [Cefaclor] Rash     Rash in his 50s; he tolerated piperacillin-tazobactam in March 2020     Scheduled Meds:  amiodarone, 200 mg, BID With Meals  apixaban, 5 mg, BID  atorvastatin, 20 mg, Nightly  bumetanide, 2 mg, Q8H  clopidogrel, 75 mg, Daily  glipizide, 5 mg, BID AC  hydrALAZINE, 100 mg, TID  linagliptin, 5 mg, Daily  [Held by provider] losartan, 75 mg, Daily  metoprolol succinate XL, 25 mg, Q24H  sodium chloride, 10 mL, Q12H  terazosin, 5 mg, Nightly            INTAKE AND OUTPUT:    Intake/Output Summary (Last 24 hours) at 11/11/2024 1045  Last data filed at 11/11/2024 0337  Gross per 24 hour   Intake 1580 ml   Output 1825 ml   Net -245 ml       Review of Systems:   GI: no n/v or abd pain  Cardiac: no chest pain or palpitations  Pulmonary: no shortness of breath or cough      Constitutional:  Temp:  [97.5 °F (36.4 °C)-98.1 °F (36.7 °C)] 97.7 °F (36.5 °C)  Heart Rate:  [66-93] 85  Resp:   "[17-18] 17  BP: (120-146)/(46-71) 128/65    Physical Exam:  General:  Alert, cooperative, appears in no acute distress  Respiratory: Clear to auscultation.  Normal respiratory effort and rate.  Cardiovascular: S1S2 Regular rate and rhythm. No murmur, rub or gallop.   Gastrointestinal: soft, non tender. Bowel sounds present.   Extremities: MARSHALL x4. No pretibial pitting edema. Adequate musculoskeletal strength.   Neuro: AAO x3 CN II-XII grossly intact          Cardiographics    Lab Review   Results from last 7 days   Lab Units 11/08/24  0514   CK TOTAL U/L 39     Results from last 7 days   Lab Units 11/08/24  0514   MAGNESIUM mg/dL 2.2     Results from last 7 days   Lab Units 11/11/24  0602   SODIUM mmol/L 143   POTASSIUM mmol/L 4.2   BUN mg/dL 59*   CREATININE mg/dL 2.34*   CALCIUM mg/dL 9.1     @LABRCNTIPbnp@  Results from last 7 days   Lab Units 11/07/24  1145   WBC 10*3/mm3 6.16   HEMOGLOBIN g/dL 9.2*   HEMATOCRIT % 30.2*   PLATELETS 10*3/mm3 200     Results from last 7 days   Lab Units 11/07/24  1145   INR  1.72*   APTT seconds 35.2         Assessment:  Persistent atrial fibrillation  Diastolic congestive heart failure  KILLIAN  Coronary artery disease  Bioprosthetic aortic valve  Hypertension  Hyperlipidemia  DM      Plan:  BP and hr stable  Diuresing per nephrology  LE swelling significantly improved, denies shortness of breath  Remains sr, continue amiodarone. AC on eliquis.     )11/11/2024  ZEINA Loredoon/Transcription:   \"Dictated utilizing Dragon dictation\".     "

## 2024-11-11 NOTE — THERAPY TREATMENT NOTE
Patient Name: Rock Maciel Jr.  : 1945    MRN: [id number]                              Today's Date: 2024       Admit Date: 2024    Visit Dx:     ICD-10-CM ICD-9-CM   1. Atrial fibrillation, persistent  I48.19 427.31     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    Chronic venous insufficiency    Nonrheumatic aortic valve stenosis    Carotid stenosis, asymptomatic    Bradycardia    Hyperlipidemia    Bilateral carotid artery disease    Abnormal cardiovascular stress test    H/O aortic valve replacement with porcine valve    Charcot-Davida-Tooth disease-like deformity of foot    Amputated toe of right foot    Fall    Non-traumatic rhabdomyolysis    S/P CABG x 2    CKD (chronic kidney disease) stage 3, GFR 30-59 ml/min    Rupture of left quadriceps tendon    Constipation    Wound of right leg    Anemia    Chronic diastolic (congestive) heart failure    Amputated toe of left foot    Gas gangrene    Cellulitis of left lower limb    Acquired absence of left foot    Bilateral lower extremity edema    CKD (chronic kidney disease) stage 3, GFR 30-59 ml/min    History of pneumonia    Atelectasis of both lungs    Abnormal pleural fluid    Pleural thickening    Atrial fibrillation, persistent    Chronic anticoagulation    Persistent atrial fibrillation     Past Medical History:   Diagnosis Date    Allergic rhinitis     Bronchitis     Coronary artery disease     Diabetes mellitus     DM (diabetes mellitus)     Encounter for annual health examination 2014    Annual Health Assessment    Gout     Hiatal  hernia     History of MRSA infection     RIGHT FOOT 2010    Hyperlipidemia     Hypertension     Murmur     Pneumothorax on right     Sleep apnea     pt wears CPAP at night    Wellness examination 06/24/2015    Annual Wellness Visit     Past Surgical History:   Procedure Laterality Date    ARTERY SURGERY Bilateral     carotid    CARDIAC CATHETERIZATION N/A 7/20/2018    Procedure: Left Heart Cath;  Surgeon: Jo Toney MD;  Location: [facility] CATH INVASIVE LOCATION;  Service: Cardiovascular    CARDIAC CATHETERIZATION N/A 7/20/2018    Procedure: Coronary angiography;  Surgeon: Jo Toney MD;  Location: [facility] CATH INVASIVE LOCATION;  Service: Cardiovascular    CAROTID ENDARTERECTOMY Bilateral     COLONOSCOPY  2008    CORONARY ARTERY BYPASS GRAFT WITH AORTIC VALVE REPAIR/REPLACEMENT N/A 7/23/2018    Procedure: INTRAOPERATIVE ALEX, MIDLINE STERNOTOMY, CORONARY ARTERY BYPASS GRAFTING X 3 USING ENDOSCOPICALLY HARVESTED LEFT GREATER SAPHENOUS VEIN,  AORTIC VALVE REPLACEMENT USING 25MM LOPEZ II ULTRA PORCINE VALVE, PRP;  Surgeon: Phong Posey MD;  Location: [facility];  Service: Cardiothoracic    FOOT SURGERY Right 2010    5th digit removal    FOOT SURGERY Left 2011    1 digit removed    INCISION AND DRAINAGE LEG Right 3/28/2020    Procedure: DEBRIDMENT OF RIGHT CALF;  Surgeon: Ross Pineda MD;  Location: [facility] OR;  Service: Vascular;  Laterality: Right;    QUADRICEPS TENDON REPAIR Left 5/14/2019    Procedure: Left QUADRICEPS TENDON REPAIR;  Surgeon: Camilo Hunter MD;  Location: [facility] OR;  Service: Orthopedics    THORACENTESIS Right 11/21/2016    THORACOSCOPY Right 5/8/2017    Procedure: BRONCHOSCOPY, RIGHT VAT,  TOTAL DECORTICATION RIGHT LUNG, PLEURAL BX, PLACEMENT SUBPLEURAL PAIN CAATHETERS X2;  Surgeon: Donald Orlando III, MD;  Location: [facility];  Service:     TONSILECTOMY, ADENOIDECTOMY, BILATERAL MYRINGOTOMY AND TUBES        General Information       Row  Name 11/11/24 1320          Physical Therapy Time and Intention    Document Type therapy note (daily note)  -     Mode of Treatment physical therapy  -       Row Name 11/11/24 1320          General Information    Existing Precautions/Restrictions fall;oxygen therapy device and L/min  -       Row Name 11/11/24 1320          Cognition    Orientation Status (Cognition) oriented x 4  -               User Key  (r) = Recorded By, (t) = Taken By, (c) = Cosigned By      Initials Name Provider Type     Elizabeth Brenner PTA Physical Therapist Assistant                   Mobility       Row Name 11/11/24 1322          Bed Mobility    Comment, (Bed Mobility) up in chair  -       Row Name 11/11/24 1322          Sit-Stand Transfer    Sit-Stand Crawford (Transfers) standby assist  -     Assistive Device (Sit-Stand Transfers) walker, front-wheeled  -       Row Name 11/11/24 1322          Gait/Stairs (Locomotion)    Crawford Level (Gait) contact guard  -     Assistive Device (Gait) walker, front-wheeled  -     Patient was able to Ambulate yes  -     Distance in Feet (Gait) 75  -     Deviations/Abnormal Patterns (Gait) crispin decreased;stride length decreased  -     Bilateral Gait Deviations forward flexed posture  -               User Key  (r) = Recorded By, (t) = Taken By, (c) = Cosigned By      Initials Name Provider Type     Elizabeth Brenner PTA Physical Therapist Assistant                   Obj/Interventions       Row Name 11/11/24 1324          Motor Skills    Therapeutic Exercise --  seated LAQ, marches, pillow squeeze, QS, GS, heel slides, SAQ x10 reps  -               User Key  (r) = Recorded By, (t) = Taken By, (c) = Cosigned By      Initials Name Provider Type     Elizabeth Brenner PTA Physical Therapist Assistant                   Goals/Plan    No documentation.                  Clinical Impression       Row Name 11/11/24 1325          Pain    Pretreatment Pain Rating  0/10 - no pain  -SM     Posttreatment Pain Rating 0/10 - no pain  -SM       Row Name 11/11/24 1325          Positioning and Restraints    Pre-Treatment Position sitting in chair/recliner  -SM     Post Treatment Position chair  -SM     In Chair reclined;call light within reach;encouraged to call for assist  -SM               User Key  (r) = Recorded By, (t) = Taken By, (c) = Cosigned By      Initials Name Provider Type    Elizabeth Mejia PTA Physical Therapist Assistant                   Outcome Measures       Row Name 11/11/24 1326          How much help from another person do you currently need...    Turning from your back to your side while in flat bed without using bedrails? 3  -SM     Moving from lying on back to sitting on the side of a flat bed without bedrails? 3  -SM     Moving to and from a bed to a chair (including a wheelchair)? 3  -SM     Standing up from a chair using your arms (e.g., wheelchair, bedside chair)? 3  -SM     Climbing 3-5 steps with a railing? 3  -SM     To walk in hospital room? 3  -SM     AM-PAC 6 Clicks Score (PT) 18  -SM     Highest Level of Mobility Goal 6 --> Walk 10 steps or more  -       Row Name 11/11/24 1326          Functional Assessment    Outcome Measure Options AM-PAC 6 Clicks Basic Mobility (PT)  -SM               User Key  (r) = Recorded By, (t) = Taken By, (c) = Cosigned By      Initials Name Provider Type    Elizabeth Mejia PTA Physical Therapist Assistant                                 Physical Therapy Education       Title: PT OT SLP Therapies (In Progress)       Topic: Physical Therapy (Done)       Point: Mobility training (Done)       Learning Progress Summary            Patient Acceptance, E,TB,D, VU,NR by  at 11/11/2024 1328    Acceptance, E,D, DU by  at 11/10/2024 1350                      Point: Home exercise program (Done)       Learning Progress Summary            Patient Acceptance, E,TB,D, VU,NR by  at 11/11/2024 1328    Acceptance,  E,D, DU by  at 11/10/2024 1350                      Point: Body mechanics (Done)       Learning Progress Summary            Patient Acceptance, E,TB,D, VU,NR by  at 11/11/2024 1328    Acceptance, E,D, DU by  at 11/10/2024 1350                      Point: Precautions (Done)       Learning Progress Summary            Patient Acceptance, E,TB,D, VU,NR by  at 11/11/2024 1328    Acceptance, E,D, DU by  at 11/10/2024 1350                                      User Key       Initials Effective Dates Name Provider Type Discipline     06/16/21 -  Marce Hall, PT Physical Therapist PT     03/07/18 -  Elizabeth Brenner PTA Physical Therapist Assistant PT                  PT Recommendation and Plan     Progress: improving  Outcome Evaluation: Pt tolerated treatment well this date. Required SBA to stand from chair, then CGA to ambulate for safety. Pt ambulated 75ft w/ Rw, no LOBs. Limited d/t fatigue and SOA. After seated rest, pt completed several LE exercises, and encouraged pt to continue a few on own during the day.     Time Calculation:         PT Charges       Row Name 11/11/24 1330             Time Calculation    Start Time 1130  -      Stop Time 1153  -      Time Calculation (min) 23 min  -      PT Received On 11/11/24  -      PT - Next Appointment 11/12/24  -                User Key  (r) = Recorded By, (t) = Taken By, (c) = Cosigned By      Initials Name Provider Type     Elizabeth Brenner PTA Physical Therapist Assistant                  Therapy Charges for Today       Code Description Service Date Service Provider Modifiers Qty    22709344558 HC GAIT TRAINING EA 15 MIN 11/11/2024 Elizabeth Brenner PTA GP 1    32670217501 HC PT THER PROC EA 15 MIN 11/11/2024 Elizabeth Brenner PTA GP 1            PT G-Codes  Outcome Measure Options: AM-PAC 6 Clicks Basic Mobility (PT)  AM-PAC 6 Clicks Score (PT): 18  PT Discharge Summary  Anticipated Discharge Disposition (PT): home with home  health    Elizabeth Brenner, PTA  11/11/2024

## 2024-11-11 NOTE — TELEPHONE ENCOUNTER
Called patient in hospital at number that was given to me ([phone]) in the presence of my Manager, Ashli, to discuss his concern for the order for his diabetic shoes and there was no answer.      Advised Manager of the following:    As an APRN, I cannot sign off on DM shoe order, but my collaborative MD is willing to sign on my behalf after an in person DM exam has been done on his feet.     Requirement is to do an in person DM foot exam prior to signing order for DM shoes.    Also, cannot do telehealth visit with patient while patient is in hospital room.

## 2024-11-12 ENCOUNTER — TELEPHONE (OUTPATIENT)
Dept: FAMILY MEDICINE CLINIC | Facility: CLINIC | Age: 79
End: 2024-11-12
Payer: MEDICARE

## 2024-11-12 LAB
ALBUMIN SERPL-MCNC: 3.5 G/DL (ref 3.5–5.2)
ALBUMIN/GLOB SERPL: 1.1 G/DL
ALP SERPL-CCNC: 91 U/L (ref 39–117)
ALT SERPL W P-5'-P-CCNC: 10 U/L (ref 1–41)
ANION GAP SERPL CALCULATED.3IONS-SCNC: 11.5 MMOL/L (ref 5–15)
ANION GAP SERPL CALCULATED.3IONS-SCNC: 11.6 MMOL/L (ref 5–15)
AST SERPL-CCNC: 11 U/L (ref 1–40)
BILIRUB SERPL-MCNC: 0.8 MG/DL (ref 0–1.2)
BUN SERPL-MCNC: 54 MG/DL (ref 8–23)
BUN SERPL-MCNC: 55 MG/DL (ref 8–23)
BUN/CREAT SERPL: 22 (ref 7–25)
BUN/CREAT SERPL: 22.6 (ref 7–25)
CALCIUM SPEC-SCNC: 8.9 MG/DL (ref 8.6–10.5)
CALCIUM SPEC-SCNC: 9.2 MG/DL (ref 8.6–10.5)
CHLORIDE SERPL-SCNC: 101 MMOL/L (ref 98–107)
CHLORIDE SERPL-SCNC: 98 MMOL/L (ref 98–107)
CO2 SERPL-SCNC: 29.4 MMOL/L (ref 22–29)
CO2 SERPL-SCNC: 29.5 MMOL/L (ref 22–29)
CREAT SERPL-MCNC: 2.43 MG/DL (ref 0.76–1.27)
CREAT SERPL-MCNC: 2.46 MG/DL (ref 0.76–1.27)
EGFRCR SERPLBLD CKD-EPI 2021: 25.8 ML/MIN/1.73
EGFRCR SERPLBLD CKD-EPI 2021: 26.2 ML/MIN/1.73
GLOBULIN UR ELPH-MCNC: 3.3 GM/DL
GLUCOSE BLDC GLUCOMTR-MCNC: 150 MG/DL (ref 70–130)
GLUCOSE BLDC GLUCOMTR-MCNC: 178 MG/DL (ref 70–130)
GLUCOSE BLDC GLUCOMTR-MCNC: 227 MG/DL (ref 70–130)
GLUCOSE BLDC GLUCOMTR-MCNC: 77 MG/DL (ref 70–130)
GLUCOSE SERPL-MCNC: 214 MG/DL (ref 65–99)
GLUCOSE SERPL-MCNC: 78 MG/DL (ref 65–99)
NT-PROBNP SERPL-MCNC: 2305 PG/ML (ref 0–1800)
POTASSIUM SERPL-SCNC: 3.9 MMOL/L (ref 3.5–5.2)
POTASSIUM SERPL-SCNC: 4.2 MMOL/L (ref 3.5–5.2)
PROT SERPL-MCNC: 6.8 G/DL (ref 6–8.5)
QT INTERVAL: 442 MS
QTC INTERVAL: 498 MS
SODIUM SERPL-SCNC: 139 MMOL/L (ref 136–145)
SODIUM SERPL-SCNC: 142 MMOL/L (ref 136–145)

## 2024-11-12 PROCEDURE — 83880 ASSAY OF NATRIURETIC PEPTIDE: CPT | Performed by: INTERNAL MEDICINE

## 2024-11-12 PROCEDURE — 97530 THERAPEUTIC ACTIVITIES: CPT

## 2024-11-12 PROCEDURE — 99232 SBSQ HOSP IP/OBS MODERATE 35: CPT | Performed by: NURSE PRACTITIONER

## 2024-11-12 PROCEDURE — 93010 ELECTROCARDIOGRAM REPORT: CPT | Performed by: INTERNAL MEDICINE

## 2024-11-12 PROCEDURE — 82948 REAGENT STRIP/BLOOD GLUCOSE: CPT

## 2024-11-12 PROCEDURE — 93005 ELECTROCARDIOGRAM TRACING: CPT

## 2024-11-12 PROCEDURE — 80053 COMPREHEN METABOLIC PANEL: CPT

## 2024-11-12 RX ORDER — AMOXICILLIN 250 MG
2 CAPSULE ORAL 2 TIMES DAILY PRN
Status: DISCONTINUED | OUTPATIENT
Start: 2024-11-12 | End: 2024-11-15 | Stop reason: HOSPADM

## 2024-11-12 RX ORDER — BISACODYL 5 MG/1
5 TABLET, DELAYED RELEASE ORAL DAILY PRN
Status: DISCONTINUED | OUTPATIENT
Start: 2024-11-12 | End: 2024-11-15 | Stop reason: HOSPADM

## 2024-11-12 RX ORDER — METOLAZONE 5 MG/1
5 TABLET ORAL ONCE
Status: COMPLETED | OUTPATIENT
Start: 2024-11-12 | End: 2024-11-12

## 2024-11-12 RX ORDER — BISACODYL 10 MG
10 SUPPOSITORY, RECTAL RECTAL DAILY PRN
Status: DISCONTINUED | OUTPATIENT
Start: 2024-11-12 | End: 2024-11-15 | Stop reason: HOSPADM

## 2024-11-12 RX ORDER — POLYETHYLENE GLYCOL 3350 17 G/17G
17 POWDER, FOR SOLUTION ORAL DAILY PRN
Status: DISCONTINUED | OUTPATIENT
Start: 2024-11-12 | End: 2024-11-15 | Stop reason: HOSPADM

## 2024-11-12 RX ADMIN — SENNOSIDES AND DOCUSATE SODIUM 2 TABLET: 50; 8.6 TABLET ORAL at 11:39

## 2024-11-12 RX ADMIN — AMIODARONE HYDROCHLORIDE 200 MG: 200 TABLET ORAL at 19:38

## 2024-11-12 RX ADMIN — CLOPIDOGREL BISULFATE 75 MG: 75 TABLET ORAL at 09:11

## 2024-11-12 RX ADMIN — BUMETANIDE 1 MG: 2 TABLET ORAL at 19:37

## 2024-11-12 RX ADMIN — ATORVASTATIN CALCIUM 20 MG: 20 TABLET, FILM COATED ORAL at 21:08

## 2024-11-12 RX ADMIN — GLIPIZIDE 5 MG: 5 TABLET ORAL at 09:09

## 2024-11-12 RX ADMIN — Medication 10 ML: at 21:09

## 2024-11-12 RX ADMIN — APIXABAN 2.5 MG: 2.5 TABLET, FILM COATED ORAL at 21:08

## 2024-11-12 RX ADMIN — AMIODARONE HYDROCHLORIDE 200 MG: 200 TABLET ORAL at 09:10

## 2024-11-12 RX ADMIN — TERAZOSIN HYDROCHLORIDE 5 MG: 5 CAPSULE ORAL at 21:08

## 2024-11-12 RX ADMIN — GLIPIZIDE 5 MG: 5 TABLET ORAL at 19:38

## 2024-11-12 RX ADMIN — BUMETANIDE 1 MG: 2 TABLET ORAL at 11:00

## 2024-11-12 RX ADMIN — Medication 10 ML: at 09:12

## 2024-11-12 RX ADMIN — APIXABAN 5 MG: 5 TABLET, FILM COATED ORAL at 09:11

## 2024-11-12 RX ADMIN — METOLAZONE 5 MG: 5 TABLET ORAL at 11:01

## 2024-11-12 RX ADMIN — LINAGLIPTIN 5 MG: 5 TABLET, FILM COATED ORAL at 11:37

## 2024-11-12 RX ADMIN — HYDRALAZINE HYDROCHLORIDE 100 MG: 50 TABLET ORAL at 21:08

## 2024-11-12 RX ADMIN — METOPROLOL SUCCINATE 25 MG: 25 TABLET, EXTENDED RELEASE ORAL at 11:01

## 2024-11-12 NOTE — PROGRESS NOTES
[facility]  Cardiology Progress Note    Patient Identification:  Name: Rock Maciel Jr.  Age: 79 y.o.  Sex: male  :  1945  MRN: [id number]                 Follow Up / Chief Complaint: follow up for afib    Interval History: Resting in chair.  No chest pain Some increased oxygen demand last night.  Pulmonary consulted.        Objective:    Past Medical History:  Past Medical History:   Diagnosis Date    Allergic rhinitis     Bronchitis     Coronary artery disease     Diabetes mellitus     DM (diabetes mellitus)     Encounter for annual health examination 2014    Annual Health Assessment    Gout     Hiatal hernia     History of MRSA infection     RIGHT FOOT     Hyperlipidemia     Hypertension     Murmur     Pneumothorax on right     Sleep apnea     pt wears CPAP at night    Wellness examination 2015    Annual Wellness Visit     Past Surgical History:  Past Surgical History:   Procedure Laterality Date    ARTERY SURGERY Bilateral     carotid    CARDIAC CATHETERIZATION N/A 2018    Procedure: Left Heart Cath;  Surgeon: Jo Toney MD;  Location: [facility] CATH INVASIVE LOCATION;  Service: Cardiovascular    CARDIAC CATHETERIZATION N/A 2018    Procedure: Coronary angiography;  Surgeon: Jo Toney MD;  Location: [facility] CATH INVASIVE LOCATION;  Service: Cardiovascular    CAROTID ENDARTERECTOMY Bilateral     COLONOSCOPY      CORONARY ARTERY BYPASS GRAFT WITH AORTIC VALVE REPAIR/REPLACEMENT N/A 2018    Procedure: INTRAOPERATIVE ALEX, MIDLINE STERNOTOMY, CORONARY ARTERY BYPASS GRAFTING X 3 USING ENDOSCOPICALLY HARVESTED LEFT GREATER SAPHENOUS VEIN,  AORTIC VALVE REPLACEMENT USING 25MM LOPEZ II ULTRA PORCINE VALVE, PRP;  Surgeon: Phong Posey MD;  Location: [facility] OR;  Service: Cardiothoracic    FOOT SURGERY Right     5th digit removal    FOOT SURGERY Left     1 digit removed    INCISION AND DRAINAGE LEG Right 3/28/2020     Procedure: DEBRIDMENT OF RIGHT CALF;  Surgeon: Ross Pineda MD;  Location: [facility];  Service: Vascular;  Laterality: Right;    QUADRICEPS TENDON REPAIR Left 5/14/2019    Procedure: Left QUADRICEPS TENDON REPAIR;  Surgeon: Camilo Hunter MD;  Location: [facility] OR;  Service: Orthopedics    THORACENTESIS Right 11/21/2016    THORACOSCOPY Right 5/8/2017    Procedure: BRONCHOSCOPY, RIGHT VAT,  TOTAL DECORTICATION RIGHT LUNG, PLEURAL BX, PLACEMENT SUBPLEURAL PAIN CAATHETERS X2;  Surgeon: Donald Orlando III, MD;  Location: [facility];  Service:     TONSILECTOMY, ADENOIDECTOMY, BILATERAL MYRINGOTOMY AND TUBES          Social History:   Social History     Tobacco Use    Smoking status: Never     Passive exposure: Never    Smokeless tobacco: Never   Substance Use Topics    Alcohol use: Yes     Comment: either one glass wine or one glass liquor per  day      Family History:  Family History   Problem Relation Age of Onset    Hypertension Father           Allergies:  Allergies   Allergen Reactions    Ceclor [Cefaclor] Rash     Rash in his 50s; he tolerated piperacillin-tazobactam in March 2020     Scheduled Meds:  amiodarone, 200 mg, BID With Meals  apixaban, 5 mg, BID  atorvastatin, 20 mg, Nightly  bumetanide, 1 mg, BID  clopidogrel, 75 mg, Daily  glipizide, 5 mg, BID AC  hydrALAZINE, 100 mg, TID  linagliptin, 5 mg, Daily  [Held by provider] losartan, 75 mg, Daily  metoprolol succinate XL, 25 mg, Q24H  sodium chloride, 10 mL, Q12H  terazosin, 5 mg, Nightly            INTAKE AND OUTPUT:    Intake/Output Summary (Last 24 hours) at 11/12/2024 1141  Last data filed at 11/12/2024 0736  Gross per 24 hour   Intake 120 ml   Output 3385 ml   Net -3265 ml       Review of Systems:   GI: No abdominal pain, nausea or vomiting   Cardiac: No chest pain or palpitation   Pulmonary: Shortness of breath improved and no cough.        Constitutional:  Temp:  [97.5 °F (36.4 °C)-98.1 °F (36.7 °C)] 97.9 °F (36.6 °C)  Heart  Rate:  [72-78] 72  Resp:  [18] 18  BP: (119-150)/(49-67) 124/59    Physical Exam:  General:  Appears in no acute distress, resting in bed  Eyes: eom normal no conjunctival drainage  HEENT:  No JVD. Thyroid not visibly enlarged. No mucosal pallor or cyanosis  Respiratory: Respirations regular and unlabored at rest. BBS with good air entry in all fields. No crackles, rubs or wheezes auscultated  Cardiovascular: S1S2 Regular rate and rhythm. No murmur, rub or gallop. No carotid bruits. DP/PT pulses     . No pretibial pitting edema  Gastrointestinal: Abdomen soft, flat, non tender. Bowel sounds present. No hepatosplenomegaly. No ascites  Skin:   Skin warm and dry to touch. No rashes    Neuro: AAO x3 CN II-XII grossly intact  Psych: Mood and affect normal, pleasant and cooperative          I reviewed the patient's new clinical results, and personally reviewed and interpreted the patient's ECG and telemetry data from the last 24 hours          Cardiographics    Lab Review   Results from last 7 days   Lab Units 11/08/24  0514   CK TOTAL U/L 39     Results from last 7 days   Lab Units 11/08/24  0514   MAGNESIUM mg/dL 2.2     Results from last 7 days   Lab Units 11/12/24  0438   SODIUM mmol/L 142   POTASSIUM mmol/L 3.9   BUN mg/dL 54*   CREATININE mg/dL 2.46*   CALCIUM mg/dL 8.9     @LABRCNTIPbnp@  Results from last 7 days   Lab Units 11/07/24  1145   WBC 10*3/mm3 6.16   HEMOGLOBIN g/dL 9.2*   HEMATOCRIT % 30.2*   PLATELETS 10*3/mm3 200     Results from last 7 days   Lab Units 11/07/24  1145   INR  1.72*   APTT seconds 35.2         Assessment:  Persistent atrial fibrillation  Diastolic congestive heart failure  KILLIAN  Coronary artery disease  Bioprosthetic aortic valve  Hypertension  Hyperlipidemia  DM      Plan:  Blood pressure and heart rate stable.    Diuresing per nephrology.    Pulmonary consulted for increased oxygen demand last night.    Chest x-ray repeated.    Sinus rhythm on the monitor/EKG.  QTc 498, AST/ALT WNL on  "11/8/24. continue amiodarone and anticoagulation (decrease Eliquis to 2.5 mg bid due to age and creatinine increase).    Home once cleared by all.      Cmp in am    )11/12/2024  ZEINA Fox/Transcription:   \"Dictated utilizing Dragon dictation\".     "

## 2024-11-12 NOTE — PROGRESS NOTES
PROGRESS NOTE      Patient Name: Rock Maciel Jr.  : 1945  MRN: [id number]  Primary Care Physician: Denise Dickson APRN  Date of admission: 2024    Patient Care Team:  Denise Dickson APRN as PCP - General (Nurse Practitioner)  Azam Banegas Jr., MD as Consulting Physician (Cardiology)  Jamil Stevens MD as Consulting Physician (Nephrology)        Subjective   Subjective:     Patient seen and examined, having some O2 desaturation on CPAP, pulmonary consulted  Swelling getting better.  Review of systems:  No complaints of any fever chills chest pain, some swelling of the legs, no abdominal pain nausea vomiting.      Allergies:    Allergies   Allergen Reactions    Ceclor [Cefaclor] Rash     Rash in his 50s; he tolerated piperacillin-tazobactam in 2020       Objective   Exam:     Vital Signs  Temp:  [97.5 °F (36.4 °C)-98.1 °F (36.7 °C)] 97.9 °F (36.6 °C)  Heart Rate:  [72-78] 72  Resp:  [18] 18  BP: (119-150)/(49-67) 124/59  SpO2:  [88 %-97 %] 97 %  on  Flow (L/min) (Oxygen Therapy):  [1-2] 2;   Device (Oxygen Therapy): nasal cannula  Body mass index is 39.06 kg/m².    General: Elderly male in no acute distress.    Head:      Normocephalic and atraumatic.    Eyes:      PERRL/EOM intact, conjunctivae and sclerae clear without nystagmus.    Neck:      No masses, thyromegaly,  trachea central with normal respiratory effort   Lungs:    Clear bilaterally to auscultation.    Heart:      Regular rate and rhythm, no murmur no gallop  Abd:        Soft, nontender, not distended, bowel sounds positive, no shifting dullness   Pulses:   Pulses palpable  Extr:        No cyanosis or clubbing--1+ edema.  Bilateral foot surgery both lower extremities dressed  Neuro:    No focal deficits.   alert oriented x3  Skin:       Intact without lesions or rashes.    Psych:    Alert and cooperative; normal mood and affect; .      Results Review:  I have personally reviewed most recent Data :  CBC    Results  from last 7 days   Lab Units 11/07/24  1145   WBC 10*3/mm3 6.16   HEMOGLOBIN g/dL 9.2*   PLATELETS 10*3/mm3 200     CMP   Results from last 7 days   Lab Units 11/12/24  0438 11/11/24  0602 11/10/24  0801 11/09/24  0419 11/08/24  0514 11/07/24  1145   SODIUM mmol/L 142 143 142 142 139 139   POTASSIUM mmol/L 3.9 4.2 4.3 4.0 4.0 4.9   CHLORIDE mmol/L 101 102 104 105 103 103   CO2 mmol/L 29.5* 29.9* 29.1* 28.3 25.6 25.2   BUN mg/dL 54* 59* 55* 60* 66* 68*   CREATININE mg/dL 2.46* 2.34* 2.19* 2.54* 2.79* 3.10*   GLUCOSE mg/dL 78 70 101* 118* 137* 250*   ALBUMIN g/dL  --   --   --   --  3.5 3.5   BILIRUBIN mg/dL  --   --   --   --  0.5 0.6   ALK PHOS U/L  --   --   --   --  87 95   AST (SGOT) U/L  --   --   --   --  7 10   ALT (SGPT) U/L  --   --   --   --  8 8     ABG      No radiology results for the last day    Results for orders placed during the hospital encounter of 10/03/24    Adult Transthoracic Echo Complete W/ Cont if Necessary Per Protocol    Interpretation Summary    Left ventricular ejection fraction appears to be 61 - 65%.    Left ventricular diastolic function was indeterminate.    The left atrial cavity is moderately dilated.    Left atrial volume is moderately increased.    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    Estimated right ventricular systolic pressure from tricuspid regurgitation is moderately elevated (45-55 mmHg).    Scheduled Meds:amiodarone, 200 mg, Oral, BID With Meals  apixaban, 5 mg, Oral, BID  atorvastatin, 20 mg, Oral, Nightly  bumetanide, 1 mg, Oral, BID  clopidogrel, 75 mg, Oral, Daily  glipizide, 5 mg, Oral, BID AC  hydrALAZINE, 100 mg, Oral, TID  linagliptin, 5 mg, Oral, Daily  [Held by provider] losartan, 75 mg, Oral, Daily  metoprolol succinate XL, 25 mg, Oral, Q24H  sodium chloride, 10 mL, Intravenous, Q12H  terazosin, 5 mg, Oral, Nightly      Continuous Infusions:   PRN Meds:  senna-docusate sodium **AND** polyethylene glycol **AND** bisacodyl **AND** bisacodyl    Calcium  Replacement - Follow Nurse / BPA Driven Protocol    Magnesium Standard Dose Replacement - Follow Nurse / BPA Driven Protocol    nitroglycerin    Phosphorus Replacement - Follow Nurse / BPA Driven Protocol    Potassium Replacement - Follow Nurse / BPA Driven Protocol    sodium chloride    sodium chloride    Assessment & Plan   Assessment and Plan:         Persistent atrial fibrillation    ASSESSMENT:  Acute kidney injury  Baseline CKD stage III  Volume overload  Atrial fibrillation  Coronary artery disease status post bypass surgery  Anemia  Status post AVR 2019  History of hypertension  Echocardiogram October 2024 ejection fraction 61 to 65%  Bilateral feet surgery    Plan:     Renal function is slightly worsened likely labile with volume, sCr 2.43  Having some O2 desaturation on home CPAP, pulmonary input noted  Acute kidney injury most likely hemodynamic mediated cardiorenal etiology, prerenal with volume overload  Baseline creatinine around 1.4 to 1.7 mg/dL, admission creatinine 3.1 mg/dL, creatinine overall improved  I can see patient still has significant edema and still has some respiratory issues I will add metolazone patient BNP level is still on the high side  Most likely angiotensin receptor blocker can be started at the time of discharge  Continue to hold amlodipine to avoid hypotension and has edema  Low-grade proteinuria continue to monitor  Renal ultrasound, limited study, left kidney obscured by overlying shadowing, trabeculated appearance of the bladder  CT AP WO shows slight increase in left renal cyst, cannot exclude RCC, bladder trabeculated with multiple diverticula, mod left inguinal hernia  Need to see urologist for for possibility of RCC can be arranged as an outpatient  Discussed with patient's nurse  Followed by cardiology, valuation noted  Overall seems like gradually improving   Will continue to follow    Note transcribed for Dr Lerner      Electronically signed by ZEINA Story,    [facility] consultant  [phone]  11/12/2024  11:45 EST

## 2024-11-12 NOTE — TELEPHONE ENCOUNTER
HUB TO RELAY AND SCHEDULED    PT NEEDS TO BE SCHEDULED WITH DR CASTELLANOS TO FILL OUT SMART FEET SHOES

## 2024-11-12 NOTE — TELEPHONE ENCOUNTER
Hub to relay:  Called patient in hospital at number that was given to me ([phone]) in the presence of my Manager to discuss his concern for the order for his diabetic shoes and there was no answer.       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      Requirement is to do an in person DM foot exam prior to signing order for DM shoes.     Also, cannot do telehealth visit with patient while patient is in hospital room.

## 2024-11-12 NOTE — THERAPY TREATMENT NOTE
Patient Name: Rock Maciel Jr.  : 1945    MRN: [id number]                              Today's Date: 2024       Admit Date: 2024    Visit Dx:     ICD-10-CM ICD-9-CM   1. Atrial fibrillation, persistent  I48.19 427.31     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    Chronic venous insufficiency    Nonrheumatic aortic valve stenosis    Carotid stenosis, asymptomatic    Bradycardia    Hyperlipidemia    Bilateral carotid artery disease    Abnormal cardiovascular stress test    H/O aortic valve replacement with porcine valve    Charcot-Davida-Tooth disease-like deformity of foot    Amputated toe of right foot    Fall    Non-traumatic rhabdomyolysis    S/P CABG x 2    CKD (chronic kidney disease) stage 3, GFR 30-59 ml/min    Rupture of left quadriceps tendon    Constipation    Wound of right leg    Anemia    Chronic diastolic (congestive) heart failure    Amputated toe of left foot    Gas gangrene    Cellulitis of left lower limb    Acquired absence of left foot    Bilateral lower extremity edema    CKD (chronic kidney disease) stage 3, GFR 30-59 ml/min    History of pneumonia    Atelectasis of both lungs    Abnormal pleural fluid    Pleural thickening    Atrial fibrillation, persistent    Chronic anticoagulation    Persistent atrial fibrillation     Past Medical History:   Diagnosis Date    Allergic rhinitis     Bronchitis     Coronary artery disease     Diabetes mellitus     DM (diabetes mellitus)     Encounter for annual health examination 2014    Annual Health Assessment    Gout     Hiatal  hernia     History of MRSA infection     RIGHT FOOT 2010    Hyperlipidemia     Hypertension     Murmur     Pneumothorax on right     Sleep apnea     pt wears CPAP at night    Wellness examination 06/24/2015    Annual Wellness Visit     Past Surgical History:   Procedure Laterality Date    ARTERY SURGERY Bilateral     carotid    CARDIAC CATHETERIZATION N/A 7/20/2018    Procedure: Left Heart Cath;  Surgeon: Jo Toney MD;  Location: [facility] CATH INVASIVE LOCATION;  Service: Cardiovascular    CARDIAC CATHETERIZATION N/A 7/20/2018    Procedure: Coronary angiography;  Surgeon: Jo Toney MD;  [facility];  Service: Cardiovascular    CAROTID ENDARTERECTOMY Bilateral     COLONOSCOPY  2008    CORONARY ARTERY BYPASS GRAFT WITH AORTIC VALVE REPAIR/REPLACEMENT N/A 7/23/2018    Procedure: INTRAOPERATIVE ALEX, MIDLINE STERNOTOMY, CORONARY ARTERY BYPASS GRAFTING X 3 USING ENDOSCOPICALLY HARVESTED LEFT GREATER SAPHENOUS VEIN,  AORTIC VALVE REPLACEMENT USING 25MM LOPEZ II ULTRA PORCINE VALVE, PRP;  Surgeon: Phong Posey MD;  Location: [facility];  Service: Cardiothoracic    FOOT SURGERY Right 2010    5th digit removal    FOOT SURGERY Left 2011    1 digit removed    INCISION AND DRAINAGE LEG Right 3/28/2020    Procedure: DEBRIDMENT OF RIGHT CALF;  Surgeon: Ross Pineda MD;  Location: [facility] OR;  Service: Vascular;  Laterality: Right;    QUADRICEPS TENDON REPAIR Left 5/14/2019    Procedure: Left QUADRICEPS TENDON REPAIR;  Surgeon: Camilo Hunter MD;  Location: [facility] OR;  Service: Orthopedics    THORACENTESIS Right 11/21/2016    THORACOSCOPY Right 5/8/2017    Procedure: BRONCHOSCOPY, RIGHT VAT,  TOTAL DECORTICATION RIGHT LUNG, PLEURAL BX, PLACEMENT SUBPLEURAL PAIN CAATHETERS X2;  Surgeon: Donald Orlando III, MD;  Location: [facility];  Service:     TONSILECTOMY, ADENOIDECTOMY, BILATERAL MYRINGOTOMY AND TUBES        General Information       Row  Name 11/12/24 1459          Physical Therapy Time and Intention    Document Type therapy note (daily note)  -CW     Mode of Treatment physical therapy;individual therapy  -       Row Name 11/12/24 1459          General Information    Patient Profile Reviewed yes  -CW     Existing Precautions/Restrictions fall;oxygen therapy device and L/min  -CW       Row Name 11/12/24 1459          Cognition    Orientation Status (Cognition) oriented x 4  -CW       Row Name 11/12/24 1459          Safety Issues/Impairments Affecting Functional Mobility    Impairments Affecting Function (Mobility) balance;endurance/activity tolerance;strength;shortness of breath  -CW               User Key  (r) = Recorded By, (t) = Taken By, (c) = Cosigned By      Initials Name Provider Type    CW Ayanna Foss PT Physical Therapist                   Mobility       Row Name 11/12/24 1502          Bed Mobility    Comment, (Bed Mobility) UI at arrival and departure  -       Row Name 11/12/24 1502          Sit-Stand Transfer    Sit-Stand [place] (Transfers) standby assist;verbal cues  -     Assistive Device (Sit-Stand Transfers) walker, front-wheeled  -CW       Row Name 11/12/24 1502          Gait/Stairs (Locomotion)    [place] Level (Gait) contact guard  -     Assistive Device (Gait) walker, front-wheeled  -CW     Distance in Feet (Gait) 140  -CW     Deviations/Abnormal Patterns (Gait) crispin decreased;stride length decreased  -CW     Bilateral Gait Deviations forward flexed posture  -     Comment, (Gait/Stairs) Very externally rotated  -               User Key  (r) = Recorded By, (t) = Taken By, (c) = Cosigned By      Initials Name Provider Type    CW Ayanna Foss PT Physical Therapist                   Obj/Interventions       Row Name 11/12/24 1504          Balance    Balance Assessment standing static balance;standing dynamic balance  -CW     Static Standing Balance standby assist;verbal cues  -CW     Dynamic  Standing Balance contact guard;verbal cues  -CW     Position/Device Used, Standing Balance walker, front-wheeled  -CW               User Key  (r) = Recorded By, (t) = Taken By, (c) = Cosigned By      Initials Name Provider Type    Ayanna Rodriguez PT Physical Therapist                   Goals/Plan    No documentation.                  Clinical Impression       Row Name 11/12/24 1512          Pain    Pretreatment Pain Rating 0/10 - no pain  -CW       Row Name 11/12/24 1512          Plan of Care Review    Plan of Care Reviewed With patient  -CW     Outcome Evaluation Pt participated with PT this PM. Pt up in chair at arrival and reports he ambulated in hallway with [facility] staff not long ago but was agreeable for more activity. Pt completed STS with sba and ambulated 150' in hallway with RW and cga. No overt loss of balance but does require O2 to maintain sats >90%. PT will continue to follow. Pt inquiring about rosy walker - PT recommends rosy walker due to wide stance and very externally rotated LE.  -CW       Row Name 11/12/24 1512          Vital Signs    O2 Delivery Pre Treatment nasal cannula  2.5L  -CW     O2 Delivery Intra Treatment nasal cannula  3L  -CW     O2 Delivery Post Treatment nasal cannula  2.5L  -CW       Row Name 11/12/24 1512          Positioning and Restraints    Pre-Treatment Position sitting in chair/recliner  -CW     Post Treatment Position chair  -CW     In Chair reclined;call light within reach;encouraged to call for assist;exit alarm on;legs elevated;notified nsg  -CW               User Key  (r) = Recorded By, (t) = Taken By, (c) = Cosigned By      Initials Name Provider Type    Ayanna Rodriguez PT Physical Therapist                   Outcome Measures       Row Name 11/12/24 1516          How much help from another person do you currently need...    Turning from your back to your side while in flat bed without using bedrails? 3  -CW     Moving from lying on back to sitting on the side  of a flat bed without bedrails? 3  -CW     Moving to and from a bed to a chair (including a wheelchair)? 3  -CW     Standing up from a chair using your arms (e.g., wheelchair, bedside chair)? 3  -CW     Climbing 3-5 steps with a railing? 3  -CW     To walk in hospital room? 3  -CW     AM-PAC 6 Clicks Score (PT) 18  -CW     Highest Level of Mobility Goal 6 --> Walk 10 steps or more  -CW       Row Name 11/12/24 1516          Functional Assessment    Outcome Measure Options AM-PAC 6 Clicks Basic Mobility (PT)  -CW               User Key  (r) = Recorded By, (t) = Taken By, (c) = Cosigned By      Initials Name Provider Type    CW Ayanna Foss, REGAN Physical Therapist                                 Physical Therapy Education       Title: PT OT SLP Therapies (In Progress)       Topic: Physical Therapy (Done)       Point: Mobility training (Done)       Learning Progress Summary            Patient Acceptance, E, VU by  at 11/12/2024 1516    Acceptance, E,TB,D, VU,NR by  at 11/11/2024 1328    Acceptance, E,D, DU by  at 11/10/2024 1350                      Point: Home exercise program (Done)       Learning Progress Summary            Patient Acceptance, E,TB,D, VU,NR by  at 11/11/2024 1328    Acceptance, E,D, DU by  at 11/10/2024 1350                      Point: Body mechanics (Done)       Learning Progress Summary            Patient Acceptance, E,TB,D, VU,NR by  at 11/11/2024 1328    Acceptance, E,D, DU by  at 11/10/2024 1350                      Point: Precautions (Done)       Learning Progress Summary            Patient Acceptance, E,TB,D, VU,NR by  at 11/11/2024 1328    Acceptance, E,D, DU by  at 11/10/2024 1350                                      User Key       Initials Effective Dates Name Provider Type Discipline     06/16/21 -  Marce Hall, PT Physical Therapist PT     03/07/18 -  Elizabeth Brenner, PTA Physical Therapist Assistant PT     12/13/22 -  Ayanna Foss PT Physical  Therapist PT                  PT Recommendation and Plan     Outcome Evaluation: Pt participated with PT this PM. Pt up in chair at arrival and reports he ambulated in hallway with [facility] staff not long ago but was agreeable for more activity. Pt completed STS with sba and ambulated 150' in hallway with RW and cga. No overt loss of balance but does require O2 to maintain sats >90%. PT will continue to follow. Pt inquiring about rosy walker - PT recommends rosy walker due to wide stance and very externally rotated LE.     Time Calculation:         PT Charges       Row Name 11/12/24 1455             Time Calculation    Start Time 1426  -CW      Stop Time 1445  -CW      Time Calculation (min) 19 min  -CW      PT Received On 11/12/24  -CW      PT - Next Appointment 11/13/24  -CW                User Key  (r) = Recorded By, (t) = Taken By, (c) = Cosigned By      Initials Name Provider Type    CW Ayanna Foss, PT Physical Therapist                  Therapy Charges for Today       Code Description Service Date Service Provider Modifiers Qty    [id number]  PT THERAPEUTIC ACT EA 15 MIN 11/12/2024 Ayanna Foss PT GP 1            PT G-Codes  Outcome Measure Options: AM-PAC 6 Clicks Basic Mobility (PT)  AM-PAC 6 Clicks Score (PT): 18  PT Discharge Summary  Anticipated Discharge Disposition (PT): home with home health    Ayanna Foss PT  11/12/2024

## 2024-11-12 NOTE — CONSULTS
[facility]    Reason for consult: hypoxemia on cpap    HPI: Mr. Maciel is a 80yo male with SHASTA on cpap admitted  here 11/7 with persistent afib.  He has had some noted oxygen desaturation on his home cpap at night while asleep. Looks like he  has been on a few liters during the day while awake as well.  He is not  having any respiratory distress or increased cough or fevers.    Past Medical History:   Diagnosis Date    Allergic rhinitis     Bronchitis     Coronary artery disease     Diabetes mellitus 2001    DM (diabetes mellitus)     Encounter for annual health examination 05/06/2014    Annual Health Assessment    Gout     Hiatal hernia     History of MRSA infection     RIGHT FOOT 2010    Hyperlipidemia     Hypertension     Murmur     Pneumothorax on right     Sleep apnea     pt wears CPAP at night    Wellness examination 06/24/2015    Annual Wellness Visit     Social History     Socioeconomic History    Marital status:     Number of children: 1   Tobacco Use    Smoking status: Never     Passive exposure: Never    Smokeless tobacco: Never   Vaping Use    Vaping status: Never Used   Substance and Sexual Activity    Alcohol use: Yes     Comment: either one glass wine or one glass liquor per  day    Drug use: Never    Sexual activity: Defer     Family History   Problem Relation Age of Onset    Hypertension Father      MEDS: reviewed as per chart  ALL: ceclor  ROS: 12 point negative except as in HPI    Vital Sign Min/Max for last 24 hours  Temp  Min: 97.5 °F (36.4 °C)  Max: 98.1 °F (36.7 °C)   BP  Min: 119/58  Max: 150/60   Pulse  Min: 72  Max: 78   Resp  Min: 18  Max: 18   SpO2  Min: 88 %  Max: 97 %   Flow (L/min) (Oxygen Therapy)  Min: 1  Max: 2   No data recorded     GEN:   appears ill, obese, A&O x3  HEENT: PERRL, EOMI, no icterus, mmm, no JVD, trachea midline, neck supple  CHEST: decreased bilat, no wheezes, no crackles, no use of accessory muscles  CV: irrg, rate ok, no m/g/r  ABD: soft, nt,  nd +bs, no hepatosplenomegaly, limited by obesity  EXT: no c/c/ trace edema  SKIN: no rashes, no xanthomas, nl turgor, warm, dry  LYMPH: no palpable cervical or supraclavicular lymphadenopathy  NEURO: CN 2-12 intact and symmetric bilaterally  PSYCH: nl affect, nl orientation, nl judgment, nl mood  MSK: no kyphoscoliosis, 5/5 strength ue and le bilaterally    Labs: 11/12: reviewed:  Glucose 78  Bun 54  Cr 2.46  Bicarb 29  11/11: CXR: reviewed cardiomegaly, pullmonary vasc congestion/edema    A/P:  Hypoxemia -- suspect due to pulmonary edema -- note nephrology following and diuresis ordered, will repeat cxr  Cheng on cpap with nocturnal hypxoemia --will bleed oxygen in at HS, suspect need for 02 will improve with diuresis  Persistent afib -- per cards  CKD III  Obesity    Says his cpap his having some trouble anyways, may need it serviced by [facility]. May need to use hospital unit while here if we are unable to maintain sats.

## 2024-11-12 NOTE — PLAN OF CARE
Goal Outcome Evaluation:  Plan of Care Reviewed With: patient           Outcome Evaluation: Pt participated with PT this PM. Pt up in chair at arrival and reports he ambulated in hallway with [facility] staff not long ago but was agreeable for more activity. Pt completed STS with sba and ambulated 150' in hallway with RW and cga. No overt loss of balance but does require O2 to maintain sats >90%. PT will continue to follow. Pt inquiring about rosy walker - PT recommends rosy walker due to wide stance and very externally rotated LE.    Anticipated Discharge Disposition (PT): home with home health

## 2024-11-12 NOTE — PLAN OF CARE
Goal Outcome Evaluation:  Plan of Care Reviewed With: patient           Outcome Evaluation: VSS, no c/o pain. Pt remains in SR. Pt up with SB assist. BLE wrapped in Kerlix and ACE. Pt remained on 1LNC overnight, tried to removed but pt O2 dropped into mid 80s on RA. Unable to bleed O2 into pt home c-pap w/o a consult with pulmonology & pt cannot maintained sats >90% w/o O2. Will CTM, safety maintained.     Diuretic in Use: PO Bumex  Response to Diuretics (Output greater than intake): yes  Daily Weight (up or down): NIKKI pt refused this morning  O2 Requirements: 1LNC  Functional Status (Activity level, tolerance and respiratory symptoms): none  Discharge Plans: Home TBD

## 2024-11-12 NOTE — PLAN OF CARE
Goal Outcome Evaluation:   Diuretic in Use: bumex, metolazone  Response to Diuretics (Output greater than intake): output appears greater  Daily Weight (up or down): down  O2 Requirements: 2 LPM per NC all day while awake; new order to bleed O2 into CPAP as needed to keep sats > 90% while asleep.  Functional Status (Activity level, tolerance and respiratory symptoms): up with 1 assit + walker  Leg wraps changed today.  Edema in lower legs noticeably reduced compared to 11/08/24 when wraps were first put on.  Discharge Plans:  pending diuresis.                                          Prednisone Counseling:  I discussed with the patient the risks of prolonged use of prednisone including but not limited to weight gain, insomnia, osteoporosis, mood changes, diabetes, susceptibility to infection, glaucoma and high blood pressure.  In cases where prednisone use is prolonged, patients should be monitored with blood pressure checks, serum glucose levels and an eye exam.  Additionally, the patient may need to be placed on GI prophylaxis, PCP prophylaxis, and calcium and vitamin D supplementation and/or a bisphosphonate.  The patient verbalized understanding of the proper use and the possible adverse effects of prednisone.  All of the patient's questions and concerns were addressed.

## 2024-11-12 NOTE — CASE MANAGEMENT/SOCIAL WORK
Continued Stay Note  [facility]     Patient Name: Rock Maciel Jr.  MRN: [id number]  Today's Date: 11/12/2024    Admit Date: 11/7/2024    Plan: Home, friend to transport   Discharge Plan       Row Name 11/12/24 1302       Plan    Plan Home, friend to transport    Plan Comments Met with pt at bedside. Discussed dc plans. No changes from previous assessment, except that pt may need w walking oximetry prior to dc. CCP will continue to follow for discharge readiness.                   Discharge Codes    No documentation.                 Expected Discharge Date and Time       Expected Discharge Date Expected Discharge Time    Nov 15, 2024               Azalia Hickey RN

## 2024-11-13 ENCOUNTER — TELEPHONE (OUTPATIENT)
Dept: FAMILY MEDICINE CLINIC | Facility: CLINIC | Age: 79
End: 2024-11-13

## 2024-11-13 DIAGNOSIS — E11.59 TYPE 2 DIABETES MELLITUS WITH OTHER CIRCULATORY COMPLICATION, WITHOUT LONG-TERM CURRENT USE OF INSULIN: ICD-10-CM

## 2024-11-13 LAB
ALBUMIN SERPL-MCNC: 3 G/DL (ref 3.5–5.2)
ALBUMIN/GLOB SERPL: 0.9 G/DL
ALP SERPL-CCNC: 83 U/L (ref 39–117)
ALT SERPL W P-5'-P-CCNC: 9 U/L (ref 1–41)
ANION GAP SERPL CALCULATED.3IONS-SCNC: 7.8 MMOL/L (ref 5–15)
AST SERPL-CCNC: 12 U/L (ref 1–40)
BILIRUB SERPL-MCNC: 0.6 MG/DL (ref 0–1.2)
BUN SERPL-MCNC: 55 MG/DL (ref 8–23)
BUN/CREAT SERPL: 21 (ref 7–25)
CALCIUM SPEC-SCNC: 8.9 MG/DL (ref 8.6–10.5)
CHLORIDE SERPL-SCNC: 100 MMOL/L (ref 98–107)
CO2 SERPL-SCNC: 32.2 MMOL/L (ref 22–29)
CREAT SERPL-MCNC: 2.62 MG/DL (ref 0.76–1.27)
EGFRCR SERPLBLD CKD-EPI 2021: 24 ML/MIN/1.73
GLOBULIN UR ELPH-MCNC: 3.5 GM/DL
GLUCOSE BLDC GLUCOMTR-MCNC: 121 MG/DL (ref 70–130)
GLUCOSE BLDC GLUCOMTR-MCNC: 141 MG/DL (ref 70–130)
GLUCOSE BLDC GLUCOMTR-MCNC: 186 MG/DL (ref 70–130)
GLUCOSE BLDC GLUCOMTR-MCNC: 79 MG/DL (ref 70–130)
GLUCOSE SERPL-MCNC: 87 MG/DL (ref 65–99)
POTASSIUM SERPL-SCNC: 3.8 MMOL/L (ref 3.5–5.2)
PROT SERPL-MCNC: 6.5 G/DL (ref 6–8.5)
QT INTERVAL: 445 MS
QT INTERVAL: 492 MS
QTC INTERVAL: 488 MS
QTC INTERVAL: 500 MS
SODIUM SERPL-SCNC: 140 MMOL/L (ref 136–145)

## 2024-11-13 PROCEDURE — 80053 COMPREHEN METABOLIC PANEL: CPT | Performed by: NURSE PRACTITIONER

## 2024-11-13 PROCEDURE — 93005 ELECTROCARDIOGRAM TRACING: CPT

## 2024-11-13 PROCEDURE — 97110 THERAPEUTIC EXERCISES: CPT

## 2024-11-13 PROCEDURE — 99232 SBSQ HOSP IP/OBS MODERATE 35: CPT | Performed by: INTERNAL MEDICINE

## 2024-11-13 PROCEDURE — 97116 GAIT TRAINING THERAPY: CPT

## 2024-11-13 PROCEDURE — 93005 ELECTROCARDIOGRAM TRACING: CPT | Performed by: NURSE PRACTITIONER

## 2024-11-13 PROCEDURE — 99232 SBSQ HOSP IP/OBS MODERATE 35: CPT

## 2024-11-13 PROCEDURE — 82948 REAGENT STRIP/BLOOD GLUCOSE: CPT

## 2024-11-13 RX ORDER — METOLAZONE 2.5 MG/1
2.5 TABLET ORAL ONCE
Status: COMPLETED | OUTPATIENT
Start: 2024-11-13 | End: 2024-11-13

## 2024-11-13 RX ADMIN — APIXABAN 2.5 MG: 2.5 TABLET, FILM COATED ORAL at 21:12

## 2024-11-13 RX ADMIN — METOPROLOL SUCCINATE 25 MG: 25 TABLET, EXTENDED RELEASE ORAL at 08:48

## 2024-11-13 RX ADMIN — BUMETANIDE 1 MG: 2 TABLET ORAL at 18:07

## 2024-11-13 RX ADMIN — HYDRALAZINE HYDROCHLORIDE 100 MG: 50 TABLET ORAL at 21:12

## 2024-11-13 RX ADMIN — Medication 10 ML: at 08:53

## 2024-11-13 RX ADMIN — POLYETHYLENE GLYCOL 3350 17 G: 17 POWDER, FOR SOLUTION ORAL at 08:43

## 2024-11-13 RX ADMIN — Medication 10 ML: at 21:12

## 2024-11-13 RX ADMIN — APIXABAN 2.5 MG: 2.5 TABLET, FILM COATED ORAL at 08:48

## 2024-11-13 RX ADMIN — METOLAZONE 2.5 MG: 2.5 TABLET ORAL at 18:07

## 2024-11-13 RX ADMIN — LINAGLIPTIN 5 MG: 5 TABLET, FILM COATED ORAL at 08:48

## 2024-11-13 RX ADMIN — GLIPIZIDE 5 MG: 5 TABLET ORAL at 18:09

## 2024-11-13 RX ADMIN — ATORVASTATIN CALCIUM 20 MG: 20 TABLET, FILM COATED ORAL at 21:15

## 2024-11-13 RX ADMIN — AMIODARONE HYDROCHLORIDE 200 MG: 200 TABLET ORAL at 08:46

## 2024-11-13 RX ADMIN — SODIUM ZIRCONIUM CYCLOSILICATE 10 G: 10 POWDER, FOR SUSPENSION ORAL at 08:44

## 2024-11-13 RX ADMIN — CLOPIDOGREL BISULFATE 75 MG: 75 TABLET ORAL at 08:48

## 2024-11-13 RX ADMIN — GLIPIZIDE 5 MG: 5 TABLET ORAL at 06:06

## 2024-11-13 RX ADMIN — AMIODARONE HYDROCHLORIDE 200 MG: 200 TABLET ORAL at 18:09

## 2024-11-13 RX ADMIN — TERAZOSIN HYDROCHLORIDE 5 MG: 5 CAPSULE ORAL at 21:12

## 2024-11-13 RX ADMIN — HYDRALAZINE HYDROCHLORIDE 100 MG: 50 TABLET ORAL at 08:48

## 2024-11-13 RX ADMIN — BUMETANIDE 1 MG: 2 TABLET ORAL at 08:46

## 2024-11-13 NOTE — PROGRESS NOTES
[facility]     Mar/chart reviewed  Follow up hypoxemia, cheng  Still needing o2 at HS, doesn't want to go home with oxygen    Vital Sign Min/Max for last 24 hours  Temp  Min: 97.7 °F (36.5 °C)  Max: 98.1 °F (36.7 °C)   BP  Min: 118/61  Max: 142/56   Pulse  Min: 64  Max: 98   Resp  Min: 18  Max: 18   SpO2  Min: 83 %  Max: 99 %   Flow (L/min) (Oxygen Therapy)  Min: 2  Max: 2   Weight  Min: 129 kg (283 lb 15.2 oz)  Max: 130 kg (286 lb)     Nad, axox3,   perrl, eomi, no icterus,  mmm, no jvd, trachea midline, neck supple,  chest decreased bilaterally, no crackles, no wheezes,   rrr,   soft, nt, nd +bs,  no c/c/ 1+ edema  Skin warm, dry no rashes    Labs: 11/13; reviewed;  Bun 55  Cr 2.6  Bicarb 32    A/P:  Hypoxemia -- suspect due to pulmonary edema -- may have some element of nocturnal hyxpoemia at baseline associated with his cheng  Cheng on cpap with nocturnal hypxoemia --will bleed oxygen in at HS, suspect need for 02 will improve with diuresis  Persistent afib -- per cards  CKD III  Obesity  Wants to go home with out oxygen, encouarage increase mobility and up in chair, might still need o2 on d/c we will see

## 2024-11-13 NOTE — PROGRESS NOTES
[facility]  Cardiology Progress Note    Patient Identification:  Name: Rock Maciel Jr.  Age: 79 y.o.  Sex: male  :  1945  MRN: [id number]                 Follow Up / Chief Complaint: follow up for afib    Interval History: resting in bed, no chest pain, reports feeling some chest congestion today, remains on 2l nc.      Objective:    Past Medical History:  Past Medical History:   Diagnosis Date    Allergic rhinitis     Bronchitis     Coronary artery disease     Diabetes mellitus     DM (diabetes mellitus)     Encounter for annual health examination 2014    Annual Health Assessment    Gout     Hiatal hernia     History of MRSA infection     RIGHT FOOT     Hyperlipidemia     Hypertension     Murmur     Pneumothorax on right     Sleep apnea     pt wears CPAP at night    Wellness examination 2015    Annual Wellness Visit     Past Surgical History:  Past Surgical History:   Procedure Laterality Date    ARTERY SURGERY Bilateral     carotid    CARDIAC CATHETERIZATION N/A 2018    Procedure: Left Heart Cath;  Surgeon: Jo Toney MD;  Location: [facility] CATH INVASIVE LOCATION;  Service: Cardiovascular    CARDIAC CATHETERIZATION N/A 2018    Procedure: Coronary angiography;  Surgeon: Jo Toney MD;  Location: [facility] CATH INVASIVE LOCATION;  Service: Cardiovascular    CAROTID ENDARTERECTOMY Bilateral     COLONOSCOPY      CORONARY ARTERY BYPASS GRAFT WITH AORTIC VALVE REPAIR/REPLACEMENT N/A 2018    Procedure: INTRAOPERATIVE ALEX, MIDLINE STERNOTOMY, CORONARY ARTERY BYPASS GRAFTING X 3 USING ENDOSCOPICALLY HARVESTED LEFT GREATER SAPHENOUS VEIN,  AORTIC VALVE REPLACEMENT USING 25MM LOPEZ II ULTRA PORCINE VALVE, PRP;  Surgeon: Phong Posey MD;  Location: [facility] OR;  Service: Cardiothoracic    FOOT SURGERY Right     5th digit removal    FOOT SURGERY Left     1 digit removed    INCISION AND DRAINAGE LEG Right 3/28/2020     Procedure: DEBRIDMENT OF RIGHT CALF;  Surgeon: Ross Pineda MD;  Location: [facility];  Service: Vascular;  Laterality: Right;    QUADRICEPS TENDON REPAIR Left 5/14/2019    Procedure: Left QUADRICEPS TENDON REPAIR;  Surgeon: Camilo Hunter MD;  Location: [facility] OR;  Service: Orthopedics    THORACENTESIS Right 11/21/2016    THORACOSCOPY Right 5/8/2017    Procedure: BRONCHOSCOPY, RIGHT VAT,  TOTAL DECORTICATION RIGHT LUNG, PLEURAL BX, PLACEMENT SUBPLEURAL PAIN CAATHETERS X2;  Surgeon: Donald Orlando III, MD;  Location: [facility];  Service:     TONSILECTOMY, ADENOIDECTOMY, BILATERAL MYRINGOTOMY AND TUBES          Social History:   Social History     Tobacco Use    Smoking status: Never     Passive exposure: Never    Smokeless tobacco: Never   Substance Use Topics    Alcohol use: Yes     Comment: either one glass wine or one glass liquor per  day      Family History:  Family History   Problem Relation Age of Onset    Hypertension Father           Allergies:  Allergies   Allergen Reactions    Ceclor [Cefaclor] Rash     Rash in his 50s; he tolerated piperacillin-tazobactam in March 2020     Scheduled Meds:  amiodarone, 200 mg, BID With Meals  apixaban, 2.5 mg, BID  atorvastatin, 20 mg, Nightly  bumetanide, 1 mg, BID  clopidogrel, 75 mg, Daily  glipizide, 5 mg, BID AC  hydrALAZINE, 100 mg, TID  linagliptin, 5 mg, Daily  [Held by provider] losartan, 75 mg, Daily  metoprolol succinate XL, 25 mg, Q24H  sodium chloride, 10 mL, Q12H  terazosin, 5 mg, Nightly            INTAKE AND OUTPUT:    Intake/Output Summary (Last 24 hours) at 11/13/2024 0950  Last data filed at 11/13/2024 0850  Gross per 24 hour   Intake 540 ml   Output 2985 ml   Net -2445 ml       ROS  Constitutional: Awake and alert, no fever. No nosebleeds  Abdomen           no abdominal pain   Cardiac              no chest pain  Pulmonary          no shortness of breath      /52 (BP Location: Left arm, Patient Position: Lying)    Pulse 67   Temp 97.7 °F (36.5 °C) (Axillary)   Resp 16   Wt 129 kg (283 lb 15.2 oz)   SpO2 98%   BMI 37.47 kg/m²   General appearance: No acute changes   Neck: Trachea midline; NECK, supple, no thyromegaly or lymphadenopathy   Lungs: Normal size and shape, normal breath sounds, equal distribution of air, no rales or rhonchi   CV: S1-S2 regular, no murmurs, no rub, no gallop   Abdomen: Soft, nontender; no masses , no abnormal abdominal sounds   Extremities: No deformity , normal color , no peripheral edema   Skin: Normal temperature, turgor and texture; no rash, ulcers      I reviewed the patient's new clinical results, and personally reviewed and interpreted the patient's ECG and telemetry data from the last 24 hours    Cardiographics    Lab Review   Results from last 7 days   Lab Units 11/08/24  0514   CK TOTAL U/L 39     Results from last 7 days   Lab Units 11/08/24  0514   MAGNESIUM mg/dL 2.2     Results from last 7 days   Lab Units 11/13/24  0417   SODIUM mmol/L 140   POTASSIUM mmol/L 3.8   BUN mg/dL 55*   CREATININE mg/dL 2.62*   CALCIUM mg/dL 8.9     @[id number]@  Results from last 7 days   Lab Units 11/07/24  1145   WBC 10*3/mm3 6.16   HEMOGLOBIN g/dL 9.2*   HEMATOCRIT % 30.2*   PLATELETS 10*3/mm3 200     Results from last 7 days   Lab Units 11/07/24  1145   INR  1.72*   APTT seconds 35.2         Assessment:  Persistent atrial fibrillation  Diastolic congestive heart failure  KILLIAN  Coronary artery disease  Bioprosthetic aortic valve  Hypertension  Hyperlipidemia  DM      Plan  Reports feeling some congestion in his upper chest throat, on 2l nc  Diuresing per nephrology bumex 1 mg twice daily and metolazone given yesterday  Remains sr on amiodarone. Qtc stable. AC on eliquis  No change from cardiac standpoint  Home once cleared by all      )11/13/2024  ZEINA Loredo  Remains normal sinus rhythm continue amiodarone and anticoagulation  Jo Toney MD      EMR Dragon/Transcription:  "  \"Dictated utilizing Dragon dictation\".     "

## 2024-11-13 NOTE — TELEPHONE ENCOUNTER
Caller: Rock Maciel Jr.    Relationship: Self    Best call back number: [phone]     What medication are you requesting: losartan (COZAAR) 50 MG tablet    What are your current symptoms: BLOOD PRESSURE    How long have you been experiencing symptoms: ONGOING    Have you had these symptoms before:    [x] Yes  [] No    Have you been treated for these symptoms before:   [x] Yes  [] No    If a prescription is needed, what is your [facility] and phone number[facility] [address] [phone] [facility] [phone] [facility]     Additional notes: PATIENT STATES THAT HE IS HAVING TOO MUCH TROUBLE SPLITTING THIS TABLET IN HALF AND WAS WONDERING IF THEY MADE A 75 MG TABLET FOR HIM TO START TAKING.     PLEASE CALL TO DISCUSS AND ADVISE PATIENT ON THIS ISSUE

## 2024-11-13 NOTE — TELEPHONE ENCOUNTER
"Hub to relay:    I wanted to explain to the patient why they need to schedule an appt with Dr. Schmitt instead of Denise. Since the paperwork is insisting an assessment of feet and requires a \"physicians\" signature. Our Physician,  Dr. Schmitt wanted to do an evaluation and he will complete the paperwork. Visit needs to be flipped from Denise to Dr. Schmitt. Thanks  "

## 2024-11-13 NOTE — PROGRESS NOTES
PROGRESS NOTE      Patient Name: Rock Maciel Jr.  : 1945  MRN: [id number]  Primary Care Physician: Denise Dickson APRN  Date of admission: 2024    Patient Care Team:  Denise Dickson APRN as PCP - General (Nurse Practitioner)  Azam Banegas Jr., MD as Consulting Physician (Cardiology)  Jamil Stevens MD as Consulting Physician (Nephrology)        Subjective   Subjective:     Patient seen and examined, edema much better  Review of systems:  No complaints of any fever chills chest pain, some swelling of the legs, no abdominal pain nausea vomiting.      Allergies:    Allergies   Allergen Reactions    Ceclor [Cefaclor] Rash     Rash in his 50s; he tolerated piperacillin-tazobactam in 2020       Objective   Exam:     Vital Signs  Temp:  [97.7 °F (36.5 °C)-98.1 °F (36.7 °C)] 97.7 °F (36.5 °C)  Heart Rate:  [64-98] 64  Resp:  [18] 18  BP: (118-142)/(55-63) 119/63  SpO2:  [83 %-99 %] 95 %  on  Flow (L/min) (Oxygen Therapy):  [2] 2;   Device (Oxygen Therapy): nasal cannula  Body mass index is 37.47 kg/m².    General: Elderly male in no acute distress.    Head:      Normocephalic and atraumatic.    Eyes:      PERRL/EOM intact, conjunctivae and sclerae clear without nystagmus.    Neck:      No masses, thyromegaly,  trachea central with normal respiratory effort   Lungs:    Clear bilaterally to auscultation.    Heart:      Regular rate and rhythm, no murmur no gallop  Abd:        Soft, nontender, not distended, bowel sounds positive, no shifting dullness   Pulses:   Pulses palpable  Extr:        No cyanosis or clubbing--1+ edema.  Bilateral foot surgery both lower extremities dressed  Neuro:    No focal deficits.   alert oriented x3  Skin:       Intact without lesions or rashes.    Psych:    Alert and cooperative; normal mood and affect; .      Results Review:  I have personally reviewed most recent Data :  CBC    Results from last 7 days   Lab Units 24  1145   WBC 10*3/mm3 6.16    HEMOGLOBIN g/dL 9.2*   PLATELETS 10*3/mm3 200     CMP   Results from last 7 days   Lab Units 11/13/24  0417 11/12/24  1304 11/12/24  0438 11/11/24  0602 11/10/24  0801 11/09/24  0419 11/08/24  0514 11/07/24  1145   SODIUM mmol/L 140 139 142 143 142 142 139 139   POTASSIUM mmol/L 3.8 4.2 3.9 4.2 4.3 4.0 4.0 4.9   CHLORIDE mmol/L 100 98 101 102 104 105 103 103   CO2 mmol/L 32.2* 29.4* 29.5* 29.9* 29.1* 28.3 25.6 25.2   BUN mg/dL 55* 55* 54* 59* 55* 60* 66* 68*   CREATININE mg/dL 2.62* 2.43* 2.46* 2.34* 2.19* 2.54* 2.79* 3.10*   GLUCOSE mg/dL 87 214* 78 70 101* 118* 137* 250*   ALBUMIN g/dL 3.0* 3.5  --   --   --   --  3.5 3.5   BILIRUBIN mg/dL 0.6 0.8  --   --   --   --  0.5 0.6   ALK PHOS U/L 83 91  --   --   --   --  87 95   AST (SGOT) U/L 12 11  --   --   --   --  7 10   ALT (SGPT) U/L 9 10  --   --   --   --  8 8     ABG      No radiology results for the last day    Results for orders placed during the hospital encounter of 10/03/24    Adult Transthoracic Echo Complete W/ Cont if Necessary Per Protocol    Interpretation Summary    Left ventricular ejection fraction appears to be 61 - 65%.    Left ventricular diastolic function was indeterminate.    The left atrial cavity is moderately dilated.    Left atrial volume is moderately increased.    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    Estimated right ventricular systolic pressure from tricuspid regurgitation is moderately elevated (45-55 mmHg).    Scheduled Meds:amiodarone, 200 mg, Oral, BID With Meals  apixaban, 2.5 mg, Oral, BID  atorvastatin, 20 mg, Oral, Nightly  bumetanide, 1 mg, Oral, BID  clopidogrel, 75 mg, Oral, Daily  glipizide, 5 mg, Oral, BID AC  hydrALAZINE, 100 mg, Oral, TID  linagliptin, 5 mg, Oral, Daily  [Held by provider] losartan, 75 mg, Oral, Daily  metoprolol succinate XL, 25 mg, Oral, Q24H  sodium chloride, 10 mL, Intravenous, Q12H  terazosin, 5 mg, Oral, Nightly      Continuous Infusions:   PRN Meds:  senna-docusate sodium **AND**  polyethylene glycol **AND** bisacodyl **AND** bisacodyl    Calcium Replacement - Follow Nurse / BPA Driven Protocol    Magnesium Standard Dose Replacement - Follow Nurse / BPA Driven Protocol    nitroglycerin    Phosphorus Replacement - Follow Nurse / BPA Driven Protocol    Potassium Replacement - Follow Nurse / BPA Driven Protocol    sodium chloride    sodium chloride    Assessment & Plan   Assessment and Plan:         Persistent atrial fibrillation    ASSESSMENT:  Acute kidney injury  Baseline CKD stage III  Volume overload  Atrial fibrillation  Coronary artery disease status post bypass surgery  Anemia  Status post AVR 2019  History of hypertension  Echocardiogram October 2024 ejection fraction 61 to 65%  Bilateral feet surgery    Plan:     Renal function is slightly worsened likely labile with volume, sCr 2.62  Having some O2 desaturation on home CPAP, pulmonary input noted  Acute kidney injury most likely hemodynamic mediated cardiorenal etiology, prerenal with volume overload  Baseline creatinine around 1.4 to 1.7 mg/dL, admission creatinine 3.1 mg/dL, creatinine overall  slightly worse with increase UOP some alkalosis   Edema much better but still c/o SOB , continue bumex at 1mg bid   Most likely angiotensin receptor blocker can be started at the time of discharge or as out patient   MAy able to go home once resp condition is stable   Continue to hold amlodipine to avoid hypotension and has edema  Low-grade proteinuria continue to monitor  Renal ultrasound, limited study, left kidney obscured by overlying shadowing, trabeculated appearance of the bladder  CT AP WO shows slight increase in left renal cyst, cannot exclude RCC, bladder trabeculated with multiple diverticula, mod left inguinal hernia  Need to see urologist for for possibility of RCC can be arranged as an outpatient  Discussed with patient's nurse  Followed by cardiology, valuation noted  Overall seems like gradually improving   Will continue to  follow    Note transcribed for Dr Lerner      Electronically signed by ZEINA Story [facility] consultant  [phone]  11/13/2024  12:21 EST

## 2024-11-13 NOTE — TELEPHONE ENCOUNTER
Name: Rock Maciel Jr.    Relationship: Self    Best Callback Number: [phone]     HUB PROVIDED THE RELAY MESSAGE FROM THE OFFICE   PATIENT VOICED UNDERSTANDING AND HAS NO FURTHER QUESTIONS AT THIS TIME    ADDITIONAL INFORMATION:WILL DISCUSS WITH PROVIDER AT UPCOMING APPOINTMENT

## 2024-11-13 NOTE — PLAN OF CARE
Goal Outcome Evaluation:  Plan of Care Reviewed With: patient        Progress: improving  Outcome Evaluation: Pt tolerated treatment well this date. Required SBA to stand, then CGA to ambulate. Pt ambulated 150ft w/ Rw, cues to stay closer to the walker. Ambulated on 2L O2, returning to the room w/ O2 sats at 87% at lowest though quick recovery. Encouraged pt to ambulate w/ nsg during the day.    Anticipated Discharge Disposition (PT): home with home health

## 2024-11-13 NOTE — THERAPY TREATMENT NOTE
Patient Name: Rock Maciel Jr.  : 1945    MRN: [id number]                              Today's Date: 2024       Admit Date: 2024    Visit Dx:     ICD-10-CM ICD-9-CM   1. Atrial fibrillation, persistent  I48.19 427.31     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    Chronic venous insufficiency    Nonrheumatic aortic valve stenosis    Carotid stenosis, asymptomatic    Bradycardia    Hyperlipidemia    Bilateral carotid artery disease    Abnormal cardiovascular stress test    H/O aortic valve replacement with porcine valve    Charcot-Davida-Tooth disease-like deformity of foot    Amputated toe of right foot    Fall    Non-traumatic rhabdomyolysis    S/P CABG x 2    CKD (chronic kidney disease) stage 3, GFR 30-59 ml/min    Rupture of left quadriceps tendon    Constipation    Wound of right leg    Anemia    Chronic diastolic (congestive) heart failure    Amputated toe of left foot    Gas gangrene    Cellulitis of left lower limb    Acquired absence of left foot    Bilateral lower extremity edema    CKD (chronic kidney disease) stage 3, GFR 30-59 ml/min    History of pneumonia    Atelectasis of both lungs    Abnormal pleural fluid    Pleural thickening    Atrial fibrillation, persistent    Chronic anticoagulation    Persistent atrial fibrillation     Past Medical History:   Diagnosis Date    Allergic rhinitis     Bronchitis     Coronary artery disease     Diabetes mellitus     DM (diabetes mellitus)     Encounter for annual health examination 2014    Annual Health Assessment    Gout     Hiatal  hernia     History of MRSA infection     RIGHT FOOT 2010    Hyperlipidemia     Hypertension     Murmur     Pneumothorax on right     Sleep apnea     pt wears CPAP at night    Wellness examination 06/24/2015    Annual Wellness Visit     Past Surgical History:   Procedure Laterality Date    ARTERY SURGERY Bilateral     carotid    CARDIAC CATHETERIZATION N/A 7/20/2018    Procedure: Left Heart Cath;  Surgeon: Jo Toney MD;  Location: [facility] CATH INVASIVE LOC[facility];  Service: Cardiovascular    CARDIAC CATHETERIZATION N/A 7/20/2018    Procedure: Coronary angiography;  Surgeon: Jo Toney MD;  [facility];  Service: Cardiovascular    CAROTID ENDARTERECTOMY Bilateral     COLONOSCOPY  2008    CORONARY ARTERY BYPASS GRAFT WITH AORTIC VALVE REPAIR/REPLACEMENT N/A 7/23/2018    Procedure: INTRAOPERATIVE ALEX, MIDLINE STERNOTOMY, CORONARY ARTERY BYPASS GRAFTING X 3 USING ENDOSCOPICALLY HARVESTED LEFT GREATER SAPHENOUS VEIN,  AORTIC VALVE REPLACEMENT USING 25MM LOPEZ II ULTRA PORCINE VALVE, PRP;  Surgeon: Phong Posey MD;  Location: [facility];  Service: Cardiothoracic    FOOT SURGERY Right 2010    5th digit removal    FOOT SURGERY Left 2011    1 digit removed    INCISION AND DRAINAGE LEG Right 3/28/2020    Procedure: DEBRIDMENT OF RIGHT CALF;  Surgeon: Ross Pineda MD;  Location: [facility] OR;  Service: Vascular;  Laterality: Right;    QUADRICEPS TENDON REPAIR Left 5/14/2019    Procedure: Left QUADRICEPS TENDON REPAIR;  Surgeon: Camilo Hunter MD;  Location: [facility] OR;  Service: Orthopedics    THORACENTESIS Right 11/21/2016    THORACOSCOPY Right 5/8/2017    Procedure: BRONCHOSCOPY, RIGHT VAT,  TOTAL DECORTICATION RIGHT LUNG, PLEURAL BX, PLACEMENT SUBPLEURAL PAIN CAATHETERS X2;  Surgeon: Donald Orlando III, MD;  Location: [facility];  Service:     TONSILECTOMY, ADENOIDECTOMY, BILATERAL MYRINGOTOMY AND TUBES        General Information       Row  Name 11/13/24 1301          Physical Therapy Time and Intention    Document Type therapy note (daily note)  -     Mode of Treatment physical therapy  -       Row Name 11/13/24 1301          General Information    Existing Precautions/Restrictions fall;oxygen therapy device and L/min  -       Row Name 11/13/24 1301          Cognition    Orientation Status (Cognition) oriented x 4  -               User Key  (r) = Recorded By, (t) = Taken By, (c) = Cosigned By      Initials Name Provider Type     Elizabeth Brenner PTA Physical Therapist Assistant                   Mobility       Row Name 11/13/24 1302          Bed Mobility    Assistive Device (Bed Mobility) bed rails;head of bed elevated  -       Row Name 11/13/24 1302          Sit-Stand Transfer    Sit-Stand [place] (Transfers) standby assist  -     Assistive Device (Sit-Stand Transfers) walker, front-wheeled  -       Row Name 11/13/24 1302          Gait/Stairs (Locomotion)    [place] Level (Gait) contact guard  -     Assistive Device (Gait) walker, front-wheeled  -     Patient was able to Ambulate yes  -     Distance in Feet (Gait) 150  -SM     Deviations/Abnormal Patterns (Gait) crispin decreased;stride length decreased  -     Bilateral Gait Deviations forward flexed posture  -               User Key  (r) = Recorded By, (t) = Taken By, (c) = Cosigned By      Initials Name Provider Type     lEizabeth Brenner PTA Physical Therapist Assistant                   Obj/Interventions       Row Name 11/13/24 1304          Motor Skills    Therapeutic Exercise --  seated LAQ, marches, pillow squeeze, QS, GS, hip abd/add, SAQ, SLR x10 reps  -               User Key  (r) = Recorded By, (t) = Taken By, (c) = Cosigned By      Initials Name Provider Type     Elizabeth Brenner PTA Physical Therapist Assistant                   Goals/Plan    No documentation.                  Clinical Impression       Row Name 11/13/24 1306           Pain    Pretreatment Pain Rating 0/10 - no pain  -SM     Posttreatment Pain Rating 0/10 - no pain  -SM       Row Name 11/13/24 1306          Positioning and Restraints    Pre-Treatment Position sitting in chair/recliner  -SM     Post Treatment Position chair  -SM     In Chair reclined;call light within reach;encouraged to call for assist  -SM               User Key  (r) = Recorded By, (t) = Taken By, (c) = Cosigned By      Initials Name Provider Type    Elizabeth Mejia PTA Physical Therapist Assistant                   Outcome Measures       Row Name 11/13/24 1307          How much help from another person do you currently need...    Turning from your back to your side while in flat bed without using bedrails? 3  -SM     Moving from lying on back to sitting on the side of a flat bed without bedrails? 3  -SM     Moving to and from a bed to a chair (including a wheelchair)? 3  -SM     Standing up from a chair using your arms (e.g., wheelchair, bedside chair)? 3  -SM     Climbing 3-5 steps with a railing? 3  -SM     To walk in hospital room? 3  -SM     AM-PAC 6 Clicks Score (PT) 18  -SM     Highest Level of Mobility Goal 6 --> Walk 10 steps or more  -       Row Name 11/13/24 1307          Functional Assessment    Outcome Measure Options AM-PAC 6 Clicks Basic Mobility (PT)  -SM               User Key  (r) = Recorded By, (t) = Taken By, (c) = Cosigned By      Initials Name Provider Type    Elizabeth Mejia PTA Physical Therapist Assistant                                 Physical Therapy Education       Title: PT OT SLP Therapies (In Progress)       Topic: Physical Therapy (Done)       Point: Mobility training (Done)       Learning Progress Summary            Patient Acceptance, E,TB,D, VU,NR by  at 11/13/2024 1307    Acceptance, E, VU by CW at 11/12/2024 1516    Acceptance, E,TB,D, VU,NR by  at 11/11/2024 1328    Acceptance, E,D, DU by PC at 11/10/2024 1350                      Point: Home exercise  program (Done)       Learning Progress Summary            Patient Acceptance, E,TB,D, VU,NR by  at 11/13/2024 1307    Acceptance, E,TB,D, VU,NR by  at 11/11/2024 1328    Acceptance, E,D, DU by  at 11/10/2024 1350                      Point: Body mechanics (Done)       Learning Progress Summary            Patient Acceptance, E,TB,D, VU,NR by  at 11/13/2024 1307    Acceptance, E,TB,D, VU,NR by  at 11/11/2024 1328    Acceptance, E,D, DU by  at 11/10/2024 1350                      Point: Precautions (Done)       Learning Progress Summary            Patient Acceptance, E,TB,D, VU,NR by  at 11/13/2024 1307    Acceptance, E,TB,D, VU,NR by  at 11/11/2024 1328    Acceptance, E,D, DU by  at 11/10/2024 1350                                      User Key       Initials Effective Dates Name Provider Type Discipline     06/16/21 -  Marce Hall, PT Physical Therapist PT     03/07/18 -  Elizabeth Brenner PTA Physical Therapist Assistant PT     12/13/22 -  Ayanna Foss PT Physical Therapist PT                  PT Recommendation and Plan     Progress: improving  Outcome Evaluation: Pt tolerated treatment well this date. Required SBA to stand, then CGA to ambulate. Pt ambulated 150ft w/ Rw, cues to stay closer to the walker. Ambulated on 2L O2, returning to the room w/ O2 sats at 87% at lowest though quick recovery. Encouraged pt to ambulate w/ nsg during the day.     Time Calculation:         PT Charges       Row Name 11/13/24 1309             Time Calculation    Start Time 1113  -      Stop Time 1136  -      Time Calculation (min) 23 min  -      PT Received On 11/13/24  -      PT - Next Appointment 11/14/24  -                User Key  (r) = Recorded By, (t) = Taken By, (c) = Cosigned By      Initials Name Provider Type     Elizabeth Brenner, GORDO Physical Therapist Assistant                  Therapy Charges for Today       Code Description Service Date Service Provider Modifiers Qty     23139417642 HC GAIT TRAINING EA 15 MIN 11/13/2024 Elizabeth Brenner, PTA GP 1    90415205578 HC PT THER PROC EA 15 MIN 11/13/2024 Elizabeth Brenner, GORDO GP 1            PT G-Codes  Outcome Measure Options: AM-PAC 6 Clicks Basic Mobility (PT)  AM-PAC 6 Clicks Score (PT): 18  PT Discharge Summary  Anticipated Discharge Disposition (PT): home with home health    Elizabeth Brenner PTA  11/13/2024

## 2024-11-13 NOTE — TELEPHONE ENCOUNTER
Caller: Rock Maciel Jr.    Relationship: Self    Best call back number: [phone]     Requested Prescriptions:   Requested Prescriptions     Pending Prescriptions Disp Refills    linagliptin (Tradjenta) 5 MG tablet tablet 90 tablet 1     Sig: Take 1 tablet by mouth Daily.        Pharmacy where request should be sent: [facility]Y #[id number] - [place], [place] - [address]  AT [facility] - [phone] [facility] [phone] FX     Last office visit with prescribing clinician: 5/17/2024   Last telemedicine visit with prescribing clinician: Visit date not found   Next office visit with prescribing clinician: 11/19/2024     Additional details provided by patient: PLEASE SEND THIS REQUEST TO PHARMACY    Does the patient have less than a 3 day supply:  [x] Yes  [] No    Would you like a call back once the refill request has been completed: [] Yes [x] No    If the office needs to give you a call back, can they leave a voicemail: [x] Yes [] No    Naz Walker Rep   11/13/24 10:03 EST

## 2024-11-13 NOTE — PLAN OF CARE
Goal Outcome Evaluation:  Plan of Care Reviewed With: patient           Outcome Evaluation: VSS, no c/o pain. Pt still in SR. RT placed new mask and tubing on pt home c-pap. Was able to maintain O2 sats >90% with 2L bled in overnight. Pt appeared to sleep some between care. Will CTM, safety maintained.       Diuretic in Use: PO bumex  Response to Diuretics (Output greater than intake): yes  Daily Weight (up or down): down  O2 Requirements: 2L  Functional Status (Activity level, tolerance and respiratory symptoms): Soa with exertion at times  Discharge Plans: home, TBD

## 2024-11-14 ENCOUNTER — APPOINTMENT (OUTPATIENT)
Dept: GENERAL RADIOLOGY | Facility: HOSPITAL | Age: 79
DRG: 309 | End: 2024-11-14
Payer: MEDICARE

## 2024-11-14 LAB
ALBUMIN SERPL-MCNC: 3.4 G/DL (ref 3.5–5.2)
ALBUMIN/GLOB SERPL: 1.1 G/DL
ALP SERPL-CCNC: 90 U/L (ref 39–117)
ALT SERPL W P-5'-P-CCNC: 13 U/L (ref 1–41)
ANION GAP SERPL CALCULATED.3IONS-SCNC: 8 MMOL/L (ref 5–15)
AST SERPL-CCNC: 12 U/L (ref 1–40)
BILIRUB SERPL-MCNC: 0.6 MG/DL (ref 0–1.2)
BUN SERPL-MCNC: 56 MG/DL (ref 8–23)
BUN/CREAT SERPL: 22.6 (ref 7–25)
CALCIUM SPEC-SCNC: 9 MG/DL (ref 8.6–10.5)
CHLORIDE SERPL-SCNC: 93 MMOL/L (ref 98–107)
CO2 SERPL-SCNC: 33 MMOL/L (ref 22–29)
CREAT SERPL-MCNC: 2.48 MG/DL (ref 0.76–1.27)
EGFRCR SERPLBLD CKD-EPI 2021: 25.6 ML/MIN/1.73
GLOBULIN UR ELPH-MCNC: 3 GM/DL
GLUCOSE BLDC GLUCOMTR-MCNC: 121 MG/DL (ref 70–130)
GLUCOSE BLDC GLUCOMTR-MCNC: 176 MG/DL (ref 70–130)
GLUCOSE BLDC GLUCOMTR-MCNC: 182 MG/DL (ref 70–130)
GLUCOSE BLDC GLUCOMTR-MCNC: 76 MG/DL (ref 70–130)
GLUCOSE SERPL-MCNC: 62 MG/DL (ref 65–99)
POTASSIUM SERPL-SCNC: 3.9 MMOL/L (ref 3.5–5.2)
PROT SERPL-MCNC: 6.4 G/DL (ref 6–8.5)
SODIUM SERPL-SCNC: 134 MMOL/L (ref 136–145)

## 2024-11-14 PROCEDURE — 71045 X-RAY EXAM CHEST 1 VIEW: CPT

## 2024-11-14 PROCEDURE — 82948 REAGENT STRIP/BLOOD GLUCOSE: CPT

## 2024-11-14 PROCEDURE — 94618 PULMONARY STRESS TESTING: CPT

## 2024-11-14 PROCEDURE — 99232 SBSQ HOSP IP/OBS MODERATE 35: CPT

## 2024-11-14 PROCEDURE — 80053 COMPREHEN METABOLIC PANEL: CPT | Performed by: NURSE PRACTITIONER

## 2024-11-14 RX ADMIN — TERAZOSIN HYDROCHLORIDE 5 MG: 5 CAPSULE ORAL at 21:12

## 2024-11-14 RX ADMIN — ATORVASTATIN CALCIUM 20 MG: 20 TABLET, FILM COATED ORAL at 21:12

## 2024-11-14 RX ADMIN — Medication 10 ML: at 08:15

## 2024-11-14 RX ADMIN — LINAGLIPTIN 5 MG: 5 TABLET, FILM COATED ORAL at 10:52

## 2024-11-14 RX ADMIN — APIXABAN 2.5 MG: 2.5 TABLET, FILM COATED ORAL at 08:15

## 2024-11-14 RX ADMIN — HYDRALAZINE HYDROCHLORIDE 100 MG: 50 TABLET ORAL at 08:14

## 2024-11-14 RX ADMIN — BUMETANIDE 1 MG: 2 TABLET ORAL at 17:15

## 2024-11-14 RX ADMIN — HYDRALAZINE HYDROCHLORIDE 100 MG: 50 TABLET ORAL at 16:08

## 2024-11-14 RX ADMIN — GLIPIZIDE 5 MG: 5 TABLET ORAL at 08:14

## 2024-11-14 RX ADMIN — GLIPIZIDE 5 MG: 5 TABLET ORAL at 16:08

## 2024-11-14 RX ADMIN — AMIODARONE HYDROCHLORIDE 200 MG: 200 TABLET ORAL at 17:15

## 2024-11-14 RX ADMIN — AMIODARONE HYDROCHLORIDE 200 MG: 200 TABLET ORAL at 08:12

## 2024-11-14 RX ADMIN — Medication 10 ML: at 21:12

## 2024-11-14 RX ADMIN — HYDRALAZINE HYDROCHLORIDE 100 MG: 50 TABLET ORAL at 21:12

## 2024-11-14 RX ADMIN — METOPROLOL SUCCINATE 25 MG: 25 TABLET, EXTENDED RELEASE ORAL at 08:14

## 2024-11-14 RX ADMIN — BUMETANIDE 1 MG: 2 TABLET ORAL at 08:12

## 2024-11-14 RX ADMIN — APIXABAN 2.5 MG: 2.5 TABLET, FILM COATED ORAL at 21:14

## 2024-11-14 RX ADMIN — CLOPIDOGREL BISULFATE 75 MG: 75 TABLET ORAL at 08:12

## 2024-11-14 RX ADMIN — BISACODYL 5 MG: 5 TABLET, COATED ORAL at 17:15

## 2024-11-14 NOTE — DISCHARGE PLACEMENT REQUEST
"Madyson Daniels Jr. (79 y.o. Male)       Date of Birth   1945    Social Security Number       Address   [address] [place] [address]    Home Phone   [phone]    MRN   [id number]       Adventism   [employer]    Marital Status                               Admission Date   11/7/24    Admission Type   Urgent    Admitting Provider   Jo Toney MD    Attending Provider   Jo Toney MD    Department, Room/Bed   [address], [address]       Discharge Date       Discharge Disposition       Discharge Destination                                 Attending Provider: Jo Toney MD    Allergies: Ceclor [Cefaclor]    Isolation: None   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/07/24)   Code Status: CPR    Ht: 185.4 cm (72.99\")   Wt: 127 kg (279 lb 8.7 oz)    Admission Cmt: None   Principal Problem: Persistent atrial fibrillation [I48.19]                   Active Insurance as of 11/7/2024       Primary Coverage       Payor Plan Insurance Group Employer/Plan Group    ANTH MEDICARE REPLACEMENT ANTHEM MEDICARE ADVANTAGE [id number]       Payor Plan Address Payor Plan Phone Number Payor Plan Fax Number Effective Dates    [address] [phone]  1/1/2016 - None Entered    [facility] [id number]         Subscriber Name Subscriber Birth Date Member ID       MADYSON DANIELS JRJc 1945 [id number]                     Emergency Contacts        (Rel.) Home Phone Work Phone Mobile Phone    Claudette Daniels (Daughter) [phone] -- [phone]    RAJ MEJIA (Friend) [phone] -- [phone]                "

## 2024-11-14 NOTE — TELEPHONE ENCOUNTER
Please call and advise patient that since I have not seen him since 4/2024 and he is in the hospital, I recommend he schedule an appointment to see me for hospital follow-up and at that time I will refill his medications because medications can change when patients are hospitalized.

## 2024-11-14 NOTE — PROGRESS NOTES
PROGRESS NOTE      Patient Name: Rock Maciel Jr.  : 1945  MRN: [id number]  Primary Care Physician: Denise Dickson APRN  Date of admission: 2024    Patient Care Team:  Denise Dickson APRN as PCP - General (Nurse Practitioner)  Azam Banegas Jr., MD as Consulting Physician (Cardiology)  Jamil Stevens MD as Consulting Physician (Nephrology)        Subjective   Subjective:     Patient seen and examined, edema much better  Great UOP  > 3L  Patient feels dyspnea improving   Review of systems:  No complaints of any fever chills chest pain, some swelling of the legs, no abdominal pain nausea vomiting.      Allergies:    Allergies   Allergen Reactions    Ceclor [Cefaclor] Rash     Rash in his 50s; he tolerated piperacillin-tazobactam in 2020       Objective   Exam:     Vital Signs  Temp:  [98.1 °F (36.7 °C)-98.4 °F (36.9 °C)] 98.1 °F (36.7 °C)  Heart Rate:  [65-90] 69  Resp:  [18] 18  BP: (121-145)/(53-59) 121/59  SpO2:  [95 %-98 %] 97 %  on  Flow (L/min) (Oxygen Therapy):  [2-3] 2;   Device (Oxygen Therapy): nasal cannula  Body mass index is 36.89 kg/m².    General: Elderly male in no acute distress.    Lungs:    Clear bilaterally to auscultation.    Heart:      Regular rate and rhythm,  Abd:        Soft, nontender, s   Pulses:   Pulses palpable  Extr:        edema present  Neuro:   AAOx 3    Results Review:  I have personally reviewed most recent Data :  CBC          CMP   Results from last 7 days   Lab Units 24  0557 24  0417 24  1304 24  0438 24  0602 11/10/24  0801 24  0419 24  0514   SODIUM mmol/L 134* 140 139 142 143 142 142 139   POTASSIUM mmol/L 3.9 3.8 4.2 3.9 4.2 4.3 4.0 4.0   CHLORIDE mmol/L 93* 100 98 101 102 104 105 103   CO2 mmol/L 33.0* 32.2* 29.4* 29.5* 29.9* 29.1* 28.3 25.6   BUN mg/dL 56* 55* 55* 54* 59* 55* 60* 66*   CREATININE mg/dL 2.48* 2.62* 2.43* 2.46* 2.34* 2.19* 2.54* 2.79*   GLUCOSE mg/dL 62* 87 214* 78 70 101*  118* 137*   ALBUMIN g/dL 3.4* 3.0* 3.5  --   --   --   --  3.5   BILIRUBIN mg/dL 0.6 0.6 0.8  --   --   --   --  0.5   ALK PHOS U/L 90 83 91  --   --   --   --  87   AST (SGOT) U/L 12 12 11  --   --   --   --  7   ALT (SGPT) U/L 13 9 10  --   --   --   --  8     ABG      No radiology results for the last day    Results for orders placed during the hospital encounter of 10/03/24    Adult Transthoracic Echo Complete W/ Cont if Necessary Per Protocol    Interpretation Summary    Left ventricular ejection fraction appears to be 61 - 65%.    Left ventricular diastolic function was indeterminate.    The left atrial cavity is moderately dilated.    Left atrial volume is moderately increased.    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    Estimated right ventricular systolic pressure from tricuspid regurgitation is moderately elevated (45-55 mmHg).    Scheduled Meds:amiodarone, 200 mg, Oral, BID With Meals  apixaban, 2.5 mg, Oral, BID  atorvastatin, 20 mg, Oral, Nightly  bumetanide, 1 mg, Oral, BID  clopidogrel, 75 mg, Oral, Daily  glipizide, 5 mg, Oral, BID AC  hydrALAZINE, 100 mg, Oral, TID  linagliptin, 5 mg, Oral, Daily  [Held by provider] losartan, 75 mg, Oral, Daily  metoprolol succinate XL, 25 mg, Oral, Q24H  sodium chloride, 10 mL, Intravenous, Q12H  terazosin, 5 mg, Oral, Nightly      Continuous Infusions:   PRN Meds:  senna-docusate sodium **AND** polyethylene glycol **AND** bisacodyl **AND** bisacodyl    Calcium Replacement - Follow Nurse / BPA Driven Protocol    Magnesium Standard Dose Replacement - Follow Nurse / BPA Driven Protocol    nitroglycerin    Phosphorus Replacement - Follow Nurse / BPA Driven Protocol    Potassium Replacement - Follow Nurse / BPA Driven Protocol    sodium chloride    sodium chloride    Assessment & Plan   Assessment and Plan:         Persistent atrial fibrillation    ASSESSMENT:  Acute kidney injury  Baseline CKD stage III  Volume overload  Atrial fibrillation  Coronary artery  disease status post bypass surgery  Anemia  Status post AVR 2019  History of hypertension  Echocardiogram October 2024 ejection fraction 61 to 65%  Bilateral feet surgery    Plan:     Renal function improved some, somewhat labile with volume, sCr 2.48  Acute kidney injury most likely hemodynamic mediated cardiorenal etiology, prerenal with volume overload  Baseline creatinine around 1.4 to 1.7 mg/dL, admission creatinine 3.1 mg/dL, creatinine overall stable with great UOP  Will continue current dose of bumex 1 mg twice daily, adjust further as outpatient.   Monitor mild alkalosis.   Restart angiotensin receptor blocker as outpatient  BP well controlled with current agents   Low-grade proteinuria continue to monitor  Renal ultrasound, limited study, left kidney obscured by overlying shadowing, trabeculated appearance of the bladder  CT AP WO shows slight increase in left renal cyst, cannot exclude RCC, bladder trabeculated with multiple diverticula, mod left inguinal hernia. Follow up as outpatient   Need to see urologist for for possibility of RCC can be arranged as an outpatient  Followed by cardiology, valuation noted  Overall seems like gradually improving   Follow up with dr. Talavera as per schedule         Electronically signed by Tiesha Mccrary MD,   [facility] kidney consultant  [phone]  11/14/2024  17:04 EST

## 2024-11-14 NOTE — PLAN OF CARE
Goal Outcome Evaluation:  Plan of Care Reviewed With: patient   Diuretic in Use: Bumex  Response to Diuretics (Output greater than intake): yes  Daily Weight (up or down): down  O2 Requirements: 2- 2.5 L   Functional Status (Activity level, tolerance and respiratory symptoms): ambulatory in baca with walker, 02 and assist of 1. Allison well.   Discharge Plans:  TBD     Progress: improving

## 2024-11-14 NOTE — DISCHARGE INSTR - OTHER ORDERS
WHEN YOU ARRIVE HOME, CALL [facility] -[phone] OPTION 1, AND THEY WILL DELIVER YOUR CONCENTRATOR AND YOUR TRANSPORT TANKS TO YOU

## 2024-11-14 NOTE — PROGRESS NOTES
[facility]      Mar/chart reviewed  Follow up hypoxemia, cheng  Still needing o2 at HS, doesn't want to go home with oxygen  No increased cough    Vital Sign Min/Max for last 24 hours  Temp  Min: 97.7 °F (36.5 °C)  Max: 98.4 °F (36.9 °C)   BP  Min: 119/63  Max: 145/59   Pulse  Min: 64  Max: 103   Resp  Min: 16  Max: 18   SpO2  Min: 80 %  Max: 98 %   Flow (L/min) (Oxygen Therapy)  Min: 2  Max: 3   Weight  Min: 127 kg (279 lb 8.7 oz)  Max: 127 kg (279 lb 8.7 oz)     Nad, axox3,   perrl, eomi, no icterus,  mmm, no jvd, trachea midline, neck supple,  chest decreased bilaterally, no crackles, no wheezes,   rrr,   soft, nt, nd +bs,  no c/c/ 1+ edema  Skin warm, dry no rashes     Labs: 11/14: reviewed:  Glucose 62  Bun 56  Cr 2.48  Bicarb 33    A/P:  Hypoxemia -- suspect due to pulmonary edema -- may have some element of nocturnal hyxpoemia at baseline associated with his cheng  Cheng on cpap with nocturnal hypxoemia --will bleed oxygen in at HS, suspect need for 02 will improve with diuresis  Persistent afib -- per cards  CKD III  Obesity  Wants to go home with out oxygen, encouarage increase mobility and up in chair, might still need o2 on d/c we will see    Repeat CXR today, may just need some oxygen at home for short term.      Will have room air oximetry checked

## 2024-11-14 NOTE — PROGRESS NOTES
[facility]  Cardiology Progress Note    Patient Identification:  Name: Rock Maciel Jr.  Age: 79 y.o.  Sex: male  :  1945  MRN: [id number]                 Follow Up / Chief Complaint: follow up for afib    Interval History: resting in bed, no chest pain. Reports that he maybe feels a little better today and not really short of breath.      Objective:    Past Medical History:  Past Medical History:   Diagnosis Date    Allergic rhinitis     Bronchitis     Coronary artery disease     Diabetes mellitus     DM (diabetes mellitus)     Encounter for annual health examination 2014    Annual Health Assessment    Gout     Hiatal hernia     History of MRSA infection     RIGHT FOOT     Hyperlipidemia     Hypertension     Murmur     Pneumothorax on right     Sleep apnea     pt wears CPAP at night    Wellness examination 2015    Annual Wellness Visit     Past Surgical History:  Past Surgical History:   Procedure Laterality Date    ARTERY SURGERY Bilateral     carotid    CARDIAC CATHETERIZATION N/A 2018    Procedure: Left Heart Cath;  Surgeon: Jo Toney MD;  Location: [facility] INVASIVE LOCATION;  Service: Cardiovascular    CARDIAC CATHETERIZATION N/A 2018    Procedure: Coronary angiography;  Surgeon: Jo Toney MD;  Location: [facility] CATH INVASIVE LOCATION;  Service: Cardiovascular    CAROTID ENDARTERECTOMY Bilateral     COLONOSCOPY      CORONARY ARTERY BYPASS GRAFT WITH AORTIC VALVE REPAIR/REPLACEMENT N/A 2018    Procedure: INTRAOPERATIVE ALEX, MIDLINE STERNOTOMY, CORONARY ARTERY BYPASS GRAFTING X 3 USING ENDOSCOPICALLY HARVESTED LEFT GREATER SAPHENOUS VEIN,  AORTIC VALVE REPLACEMENT USING 25MM LOPEZ II ULTRA PORCINE VALVE, PRP;  Surgeon: Phong Posey MD;  Location: [facility] OR;  Service: Cardiothoracic    FOOT SURGERY Right     5th digit removal    FOOT SURGERY Left     1 digit removed    INCISION AND DRAINAGE LEG Right  3/28/2020    Procedure: DEBRIDMENT OF RIGHT CALF;  Surgeon: Ross Pineda MD;  Location: [facility] MAIN OR;  Service: Vascular;  Laterality: Right;    QUADRICEPS TENDON REPAIR Left 5/14/2019    Procedure: Left QUADRICEPS TENDON REPAIR;  Surgeon: Camilo Hunter MD;  Location: [facility] MAIN OR;  Service: Orthopedics    THORACENTESIS Right 11/21/2016    THORACOSCOPY Right 5/8/2017    Procedure: BRONCHOSCOPY, RIGHT VAT,  TOTAL DECORTICATION RIGHT LUNG, PLEURAL BX, PLACEMENT SUBPLEURAL PAIN CAATHETERS X2;  Surgeon: Donald Orlando III, MD;  Location: [facility] OR;  Service:     TONSILECTOMY, ADENOIDECTOMY, BILATERAL MYRINGOTOMY AND TUBES          Social History:   Social History     Tobacco Use    Smoking status: Never     Passive exposure: Never    Smokeless tobacco: Never   Substance Use Topics    Alcohol use: Yes     Comment: either one glass wine or one glass liquor per  day      Family History:  Family History   Problem Relation Age of Onset    Hypertension Father           Allergies:  Allergies   Allergen Reactions    Ceclor [Cefaclor] Rash     Rash in his 50s; he tolerated piperacillin-tazobactam in March 2020     Scheduled Meds:  amiodarone, 200 mg, BID With Meals  apixaban, 2.5 mg, BID  atorvastatin, 20 mg, Nightly  bumetanide, 1 mg, BID  clopidogrel, 75 mg, Daily  glipizide, 5 mg, BID AC  hydrALAZINE, 100 mg, TID  linagliptin, 5 mg, Daily  [Held by provider] losartan, 75 mg, Daily  metoprolol succinate XL, 25 mg, Q24H  sodium chloride, 10 mL, Q12H  terazosin, 5 mg, Nightly            INTAKE AND OUTPUT:    Intake/Output Summary (Last 24 hours) at 11/14/2024 1003  Last data filed at 11/14/2024 0650  Gross per 24 hour   Intake 315 ml   Output 3480 ml   Net -3165 ml       Review of Systems:   GI: no n/v or abd pain  Cardiac: no chest pain or palpitations  Pulmonary: no shortness of breath or cough        /57 (BP Location: Right arm, Patient Position: Lying)   Pulse 70   Temp 98.2 °F (36.8 °C)  "(Oral)   Resp 18   Wt 127 kg (279 lb 8.7 oz)   SpO2 98%   BMI 36.89 kg/m²   Physical Exam:  General:  Alert, cooperative, appears in no acute distress  Respiratory: Clear to auscultation.  Normal respiratory effort and rate.  Cardiovascular: S1S2 Regular rate and rhythm. No murmur, rub or gallop.   Gastrointestinal: soft, non tender. Bowel sounds present.   Extremities: MARSHALL x4. No pretibial pitting edema. Adequate musculoskeletal strength.   Neuro: AAO x3 CN II-XII grossly intact            I reviewed the patient's new clinical results, and personally reviewed and interpreted the patient's ECG and telemetry data from the last 24 hours    Cardiographics    Lab Review   Results from last 7 days   Lab Units 11/08/24  0514   CK TOTAL U/L 39     Results from last 7 days   Lab Units 11/08/24  0514   MAGNESIUM mg/dL 2.2     Results from last 7 days   Lab Units 11/14/24  0557   SODIUM mmol/L 134*   POTASSIUM mmol/L 3.9   BUN mg/dL 56*   CREATININE mg/dL 2.48*   CALCIUM mg/dL 9.0     @LABRCNTIPbnp@  Results from last 7 days   Lab Units 11/07/24  1145   WBC 10*3/mm3 6.16   HEMOGLOBIN g/dL 9.2*   HEMATOCRIT % 30.2*   PLATELETS 10*3/mm3 200     Results from last 7 days   Lab Units 11/07/24  1145   INR  1.72*   APTT seconds 35.2         Assessment:  Persistent atrial fibrillation  Diastolic congestive heart failure  KILLIAN  Coronary artery disease  Bioprosthetic aortic valve  Hypertension  Hyperlipidemia  DM      Plan  BP and HR stable  SR, controlled on amiodarone, ac on eliquis  Appreciate pulm recs-Chest xr with vascular congestion similar to previous cxr and elevation of right hemidiaphragm, similar to previous. Possible need  for home o2  Diuresing per nephrology  Home once ok with all      )11/14/2024  ZEINA Loredo/Transcription:   \"Dictated utilizing Dragon dictation\".     "

## 2024-11-14 NOTE — PLAN OF CARE
Goal Outcome Evaluation:  Plan of Care Reviewed With: patient      Outcome Evaluation: Patient A/O x4. Pleasant and cooperative. Up with x1 assistance and walker. Walked 2 laps around nursing station today. No complaints of pain this shift. Walking oximetry done today. Patient is requiring 2L O2 NC to maintain O2 sats > 90%. No BM this shift. PRN Ducolax given x1. Patient to have Ducolax suppository tomorrow morning if no BM, per prn bowel regimen schedule. VSS. Safety maintained.

## 2024-11-14 NOTE — PLAN OF CARE
Goal Outcome Evaluation:   Diuretic in Use: bumex, metolazone  Response to Diuretics (Output greater than intake): output appears greater  Daily Weight (up or down): down  O2 Requirements: 2 LPM all day  Functional Status (Activity level, tolerance and respiratory symptoms): up with 1 assist + walker + therapeutic shoes from home, and oxygen.  Discharge Plans:  pending diuresis.

## 2024-11-14 NOTE — CASE MANAGEMENT/SOCIAL WORK
Continued Stay Note  [facility]     Patient Name: Rock Maciel Jr.  MRN: [id number]  Today's Date: 11/14/2024    Admit Date: 11/7/2024    Plan: Home with home o2, friend will transport   Discharge Plan       Row Name 11/14/24 1200       Plan    Plan Home with home o2, friend will transport    Plan Comments Met with pt at bedside, discussed that pt will require home o2. After reviewing DME companies, pt has decided upon [employer]. Orders noted.                   Discharge Codes    No documentation.                 Expected Discharge Date and Time       Expected Discharge Date Expected Discharge Time    Nov 15, 2024               Azalia Hickey RN

## 2024-11-14 NOTE — PROGRESS NOTES
Exercise Oximetry    Patient Name:Rock Maciel Jr.   MRN: [id number]   Date: 11/14/24             ROOM AIR BASELINE   SpO2% 93   Heart Rate 77   Blood Pressure      EXERCISE ON ROOM AIR SpO2% EXERCISE ON O2 @ LPM SpO2%   1 MINUTE 90 1 MINUTE    2 MINUTES 89 2 MINUTES    3 MINUTES 88 3 MINUTES                    2    4 MINUTES  4 MINUTES 89   5 MINUTES  5 MINUTES 90   6 MINUTES  6 MINUTES 91              Distance Walked  2 laps around nurses' station Distance Walked   Dyspnea (Michaela Scale)  6 Dyspnea (Michaela Scale)   Fatigue (Michaela Scale)   Fatigue (Michaela Scale)   SpO2% Post Exercise  93 SpO2% Post Exercise   HR Post Exercise  78 HR Post Exercise   Time to Recovery   Time to Recovery     Comments: found patient on 2L. walked patient 2 laps around nurses' station with walker. Patient required 2L of oxygen to maintain spo2 greater than 88%. Returned patient to bedside on 2L and spo2 was 93%.

## 2024-11-15 ENCOUNTER — READMISSION MANAGEMENT (OUTPATIENT)
Dept: CALL CENTER | Facility: HOSPITAL | Age: 79
End: 2024-11-15
Payer: MEDICARE

## 2024-11-15 VITALS
DIASTOLIC BLOOD PRESSURE: 55 MMHG | BODY MASS INDEX: 36.69 KG/M2 | WEIGHT: 278 LBS | RESPIRATION RATE: 18 BRPM | OXYGEN SATURATION: 93 % | TEMPERATURE: 98.2 F | SYSTOLIC BLOOD PRESSURE: 132 MMHG | HEART RATE: 66 BPM

## 2024-11-15 LAB
ALBUMIN SERPL-MCNC: 3.2 G/DL (ref 3.5–5.2)
ALBUMIN/GLOB SERPL: 1 G/DL
ALP SERPL-CCNC: 91 U/L (ref 39–117)
ALT SERPL W P-5'-P-CCNC: 15 U/L (ref 1–41)
ANION GAP SERPL CALCULATED.3IONS-SCNC: 11 MMOL/L (ref 5–15)
AST SERPL-CCNC: 13 U/L (ref 1–40)
BILIRUB SERPL-MCNC: 0.5 MG/DL (ref 0–1.2)
BUN SERPL-MCNC: 59 MG/DL (ref 8–23)
BUN/CREAT SERPL: 21.9 (ref 7–25)
CALCIUM SPEC-SCNC: 8.9 MG/DL (ref 8.6–10.5)
CHLORIDE SERPL-SCNC: 95 MMOL/L (ref 98–107)
CO2 SERPL-SCNC: 33 MMOL/L (ref 22–29)
CREAT SERPL-MCNC: 2.69 MG/DL (ref 0.76–1.27)
EGFRCR SERPLBLD CKD-EPI 2021: 23.2 ML/MIN/1.73
GLOBULIN UR ELPH-MCNC: 3.1 GM/DL
GLUCOSE BLDC GLUCOMTR-MCNC: 186 MG/DL (ref 70–130)
GLUCOSE BLDC GLUCOMTR-MCNC: 84 MG/DL (ref 70–130)
GLUCOSE SERPL-MCNC: 70 MG/DL (ref 65–99)
POTASSIUM SERPL-SCNC: 3.8 MMOL/L (ref 3.5–5.2)
PROT SERPL-MCNC: 6.3 G/DL (ref 6–8.5)
SODIUM SERPL-SCNC: 139 MMOL/L (ref 136–145)

## 2024-11-15 PROCEDURE — 80053 COMPREHEN METABOLIC PANEL: CPT | Performed by: NURSE PRACTITIONER

## 2024-11-15 PROCEDURE — 99238 HOSP IP/OBS DSCHRG MGMT 30/<: CPT | Performed by: NURSE PRACTITIONER

## 2024-11-15 PROCEDURE — 82948 REAGENT STRIP/BLOOD GLUCOSE: CPT

## 2024-11-15 RX ORDER — BUMETANIDE 1 MG/1
1 TABLET ORAL 2 TIMES DAILY
Qty: 60 TABLET | Refills: 5 | Status: SHIPPED | OUTPATIENT
Start: 2024-11-15

## 2024-11-15 RX ORDER — AMIODARONE HYDROCHLORIDE 200 MG/1
200 TABLET ORAL 2 TIMES DAILY WITH MEALS
Qty: 60 TABLET | Refills: 1 | Status: SHIPPED | OUTPATIENT
Start: 2024-11-15 | End: 2024-11-21

## 2024-11-15 RX ORDER — METOPROLOL SUCCINATE 25 MG/1
25 TABLET, EXTENDED RELEASE ORAL DAILY
Qty: 30 TABLET | Refills: 5 | Status: SHIPPED | OUTPATIENT
Start: 2024-11-15

## 2024-11-15 RX ORDER — HYDRALAZINE HYDROCHLORIDE 25 MG/1
25 TABLET, FILM COATED ORAL 3 TIMES DAILY
Qty: 90 TABLET | Refills: 5 | Status: SHIPPED | OUTPATIENT
Start: 2024-11-16

## 2024-11-15 RX ORDER — NITROGLYCERIN 0.4 MG/1
0.4 TABLET SUBLINGUAL
Qty: 25 TABLET | Refills: 0 | Status: SHIPPED | OUTPATIENT
Start: 2024-11-15

## 2024-11-15 RX ORDER — HYDRALAZINE HYDROCHLORIDE 25 MG/1
25 TABLET, FILM COATED ORAL 3 TIMES DAILY
Status: DISCONTINUED | OUTPATIENT
Start: 2024-11-15 | End: 2024-11-15

## 2024-11-15 RX ORDER — HYDRALAZINE HYDROCHLORIDE 25 MG/1
25 TABLET, FILM COATED ORAL 3 TIMES DAILY
Status: DISCONTINUED | OUTPATIENT
Start: 2024-11-16 | End: 2024-11-15 | Stop reason: HOSPADM

## 2024-11-15 RX ADMIN — POLYETHYLENE GLYCOL 3350 17 G: 17 POWDER, FOR SOLUTION ORAL at 10:00

## 2024-11-15 RX ADMIN — BUMETANIDE 1 MG: 2 TABLET ORAL at 09:50

## 2024-11-15 RX ADMIN — CLOPIDOGREL BISULFATE 75 MG: 75 TABLET ORAL at 09:37

## 2024-11-15 RX ADMIN — LINAGLIPTIN 5 MG: 5 TABLET, FILM COATED ORAL at 09:36

## 2024-11-15 RX ADMIN — Medication 10 ML: at 09:36

## 2024-11-15 RX ADMIN — SENNOSIDES AND DOCUSATE SODIUM 2 TABLET: 50; 8.6 TABLET ORAL at 10:00

## 2024-11-15 RX ADMIN — GLIPIZIDE 5 MG: 5 TABLET ORAL at 09:37

## 2024-11-15 RX ADMIN — BISACODYL 10 MG: 10 SUPPOSITORY RECTAL at 14:31

## 2024-11-15 RX ADMIN — METOPROLOL SUCCINATE 25 MG: 25 TABLET, EXTENDED RELEASE ORAL at 09:50

## 2024-11-15 RX ADMIN — APIXABAN 2.5 MG: 2.5 TABLET, FILM COATED ORAL at 09:37

## 2024-11-15 RX ADMIN — AMIODARONE HYDROCHLORIDE 200 MG: 200 TABLET ORAL at 09:37

## 2024-11-15 NOTE — DISCHARGE INSTRUCTIONS
Home oxygen is ordered at 2 liters/min continuously. It will be bled into the cpap at night as well.

## 2024-11-15 NOTE — CASE MANAGEMENT/SOCIAL WORK
Continued Stay Note  [facility]     Patient Name: Rock Maciel Jr.  MRN: [id number]  Today's Date: 11/15/2024    Admit Date: 11/7/2024    Plan: Home with home o2, friend to transport   Discharge Plan       Row Name 11/15/24 1333       Plan    Plan Home with home o2, friend to transport    Plan Comments Pt to be dc. Pt confirmed that he has no dc needs. Transport tank at bedside. Pt instructed to contact [facility] when he arrives home so that supplies/concentrator could be delivered. Instructions  and phone number on AVS.  Friend to transport home.                   Discharge Codes    No documentation.                 Expected Discharge Date and Time       Expected Discharge Date Expected Discharge Time    Nov 15, 2024               Azalia Hickey RN

## 2024-11-15 NOTE — DISCHARGE INSTR - APPOINTMENTS
Per Dr. Lerner, [facility] appointment scheduled for Monday November 18, 2024 at 12:30pm   Address for [facility]: [facility], [place] [address]

## 2024-11-15 NOTE — OUTREACH NOTE
Prep Survey      Flowsheet Row Responses   [facility] [place] facility patient discharged from? [place]   Is LACE score < 7 ? No   Eligibility [facility]   Date of Admission 11/07/24   Date of Discharge 11/15/24   Discharge Disposition Home or Self Care   Discharge diagnosis Persistent atrial fibrillation  Principal problem   Does the patient have one of the following disease processes/diagnoses(primary or secondary)? Other   Does the patient have Home health ordered? No   Is there a DME ordered? Yes   What DME was ordered? [employer] to provide home o2   Prep survey completed? Yes            LILLIAN LACY - Registered Nurse

## 2024-11-15 NOTE — CASE MANAGEMENT/SOCIAL WORK
Case Management Discharge Note      Final Note: Home, friend to transport, [employer] to provide home o2, transport tank with pt.         Selected Continued Care - Discharged on 11/15/2024 Admission date: 11/7/2024 - Discharge disposition: Home or Self Care      Destination    No services have been selected for the patient.                Durable Medical Equipment Coordination complete.      Service Provider Services Address Phone Fax Patient Preferred    [employer] [facility] Durable Medical Equipment [address] [place] [id number] [phone] --              Dialysis/Infusion    No services have been selected for the patient.                Home Medical Care    No services have been selected for the patient.                Therapy    No services have been selected for the patient.                Community Resources    No services have been selected for the patient.                Community & DME    No services have been selected for the patient.                    Transportation Services  Private: Car    Final Discharge Disposition Code: 01 - home or self-care

## 2024-11-15 NOTE — PLAN OF CARE
Goal Outcome Evaluation:  Plan of Care Reviewed With: patient        Progress: no change  Outcome Evaluation: Maintained on 2L N/C while awake, required 3L with CPAP to keep sats consistently >90%. Pt up in chair until HS, C& DB enc. No BM during shift. Safety maintained.

## 2024-11-15 NOTE — PROGRESS NOTES
[facility]      Mar/chart reviewed  Follow up hypoxemia, cheng  Still needing o2 at HS,  No increased cough    Vital Sign Min/Max for last 24 hours  Temp  Min: 98.1 °F (36.7 °C)  Max: 98.4 °F (36.9 °C)   BP  Min: 114/56  Max: 132/55   Pulse  Min: 65  Max: 90   Resp  Min: 18  Max: 18   SpO2  Min: 84 %  Max: 100 %   Flow (L/min) (Oxygen Therapy)  Min: 1  Max: 3   Weight  Min: 126 kg (278 lb)  Max: 126 kg (278 lb)     Nad, axox3,   perrl, eomi, no icterus,  mmm, no jvd, trachea midline, neck supple,  chest decreased bilaterally, no crackles, no wheezes,   rrr,   soft, nt, nd +bs,  no c/c/ 1+ edema  Skin warm, dry no rashes    Labs: 11/15: reviewed:  Bun 59  Cr 2.7  Bicarb 33    A/P:  Hypoxemia -- suspect due to pulmonary edema -- may have some element of nocturnal hyxpoemia at baseline associated with his cheng  Cheng on cpap with nocturnal hypxoemia --will bleed oxygen in at HS, suspect need for 02 will improve with diuresis  Persistent afib -- per cards  CKD III  Obesity    No objection to d/c home with oxygen, oxygen ordered, follow up with his sleep medicine after d/c from hospital.

## 2024-11-15 NOTE — DISCHARGE SUMMARY
[facility]  Physician Discharge Summary    Patient Identification:  Name: Rock Maciel Jr.  Age: 79 y.o.  Sex: male  :  1945  MRN: [id number]    Admit date: 2024    Discharge date and time:   11/15/24 at 1224      Admitting Physician: Jo Toney MD     Discharge Physician: ZEINA Fox      Discharge Diagnoses:   Active Hospital Problems    Diagnosis     **Persistent atrial fibrillation        Discharged Condition: stable    Hospital Course:     Please refer to H&P for full details.  Rock Maciel is an 79-year-old male who underwent a successful cardioversion and remains in sinus rhythm.  He denies any chest pain, palpitations or shortness of breath.  He will be discharged on amiodarone 200 mg twice a day and on his visit with Dr. Toney this will be decreased at that time.  He was seen by multiple providers during his stay.  He will go home on oxygen which has been delivered to the room per pulmonology.  He will follow-up with urology due to abnormal CT in the outpatient setting.  He will nephrology in 1 to 2 weeks.  He remained stable at discharge.  All providers okay with discharge.  Full details of discharge as below.      Consults:   IP CONSULT TO NEPHROLOGY  IP CONSULT TO PULMONOLOGY    Discharge Exam:  General: No acute distress   Skin: Warm and dry, no diaphoresis noted   EYES: Eom normal and no conjunctival drainage   HEENT: external ear and nose normal; oral mucosa moist   Neck: Supple; no carotid bruits; no JVD   Heart: S1S2 regular rate and rhythm; no murmurs; no gallop or rub appreciated   Pulmonary: Respirations regular, unlabored at rest, bilateral breath sounds have good air entry throughout all lung fields; no crackles, rubs or wheezes auscultated.     GI: Soft, non-tender, non-distended, positive bowel sounds  No hepatosplenomegaly   Extremities: Bilateral lower extremities have no pre-tibial pitting edema; DP/PT pulses are palpable    Neurological: Alert and oriented x 3; no neuro deficits          LABS:          Results from last 7 days   Lab Units 11/15/24  0442   SODIUM mmol/L 139   POTASSIUM mmol/L 3.8   BUN mg/dL 59*   CREATININE mg/dL 2.69*   CALCIUM mg/dL 8.9     @LABRCNTbnp@              Disposition:  Home    Discharge Medications:      Discharge Medications        New Medications        Instructions Start Date   amiodarone 200 MG tablet  Commonly known as: PACERONE   200 mg, Oral, 2 Times Daily With Meals      nitroglycerin 0.4 MG SL tablet  Commonly known as: NITROSTAT   0.4 mg, Sublingual, Every 5 Minutes PRN, Take no more than 3 doses in 15 minutes.             Changes to Medications        Instructions Start Date   apixaban 2.5 MG tablet tablet  Commonly known as: ELIQUIS  What changed:   medication strength  how much to take   2.5 mg, Oral, 2 Times Daily      bumetanide 1 MG tablet  Commonly known as: BUMEX  What changed:   medication strength  how much to take  when to take this   1 mg, Oral, 2 Times Daily      hydrALAZINE 25 MG tablet  Commonly known as: APRESOLINE  What changed:   medication strength  how much to take   25 mg, Oral, 3 Times Daily   Start Date: November 16, 2024     metoprolol succinate XL 25 MG 24 hr tablet  Commonly known as: TOPROL-XL  What changed: when to take this   25 mg, Oral, Daily             Continue These Medications        Instructions Start Date   Accu-Chek Keshia Plus test strip  Generic drug: glucose blood   USE TO TEST BLOOD SUGAR    TWICE DAILY FOR DIABETES      atorvastatin 20 MG tablet  Commonly known as: LIPITOR   20 mg, Oral, Nightly, NEED TO SEE PROVIDER FOR FUTURE REFILLS.      cholecalciferol 25 MCG (1000 UT) tablet  Commonly known as: VITAMIN D3   1,000 Units, Daily      clopidogrel 75 MG tablet  Commonly known as: PLAVIX   75 mg, Oral, Daily      glipizide 5 MG tablet  Commonly known as: GLUCOTROL   5 mg, Oral, 2 Times Daily Before Meals      linagliptin 5 MG tablet tablet  Commonly  "known as: Tradjenta   5 mg, Oral, Daily      terazosin 5 MG capsule  Commonly known as: HYTRIN   TAKE 1 CAPSULE BY MOUTH EVERY DAY AT BEDTIME FOR 90 DAYS      vitamin C 250 MG tablet  Commonly known as: ASCORBIC ACID   250 mg, Daily      vitamin D 1.25 MG (72193 UT) capsule capsule  Commonly known as: ERGOCALCIFEROL   TAKE 1 CAPSULE BY MOUTH ONCE A WEEK FOR 90 DAYS      Zinc 50 MG tablet              Stop These Medications      amLODIPine 10 MG tablet  Commonly known as: NORVASC     losartan 50 MG tablet  Commonly known as: COZAAR     metoprolol tartrate 25 MG tablet  Commonly known as: LOPRESSOR                Discharge Home Instructions:   1. Follow up with Dr. Maru Talavera in 1 to 2 weeks. They will call for an appointment.  2. Follow-up with Dr. Camilo El with pulmonary. They will call with an appointment time.  3.  Follow-up with [facility] due to abnormal CT.  Please call make an appointment.  4. Follow-up with Dr. Toney on November 21, 2024 at 11 AM.  5. While on Amiodarone you will do a check up every 6 months to yearly checkup for eye examination, thyroid function test, chest x-ray and liver function test has been advised.  6. Monitor blood pressure 1 hour and a half after taking blood pressure medications and  write the reading down along with heart rate.  Please bring these readings with you on your follow up. Call if sys <100, or HR <60.    Signed:  ZEINA Fox  11/15/2024  12:24 EST      EMR Dragon/Transcription:   \"Dictated utilizing Dragon dictation\".     "

## 2024-11-15 NOTE — PROGRESS NOTES
Nutrition Services    Patient Name:  Rock Maciel Jr.  YOB: 1945  MRN: [id number]  Admit Date:  11/7/2024    Assessment Date:  11/15/24    CLINICAL NUTRITION - PROGRESS NOTE       Reason for Encounter Follow-up         Nutrition Order Diet: Cardiac; Healthy Heart (2-3 Na+); Fluid Consistency: Thin (IDDSI 0)    Supplements/Snacks     EN/PN Order          Pertinent Information Patient has been eating well.  Denies any needs.  Expecting to be d/c home today.    + BM 11/7  2+ Edema noted        Intervention/Plan No additional needs noted, available prn.     RD to follow up per protocol.    Electronically signed by:  Hemanth Atkinson RD  11/15/24 11:56 EST

## 2024-11-18 ENCOUNTER — TRANSITIONAL CARE MANAGEMENT TELEPHONE ENCOUNTER (OUTPATIENT)
Dept: CALL CENTER | Facility: HOSPITAL | Age: 79
End: 2024-11-18
Payer: MEDICARE

## 2024-11-18 ENCOUNTER — TELEPHONE (OUTPATIENT)
Dept: CARDIOLOGY | Facility: CLINIC | Age: 79
End: 2024-11-18
Payer: MEDICARE

## 2024-11-18 ENCOUNTER — TELEPHONE (OUTPATIENT)
Dept: CARDIOLOGY | Facility: CLINIC | Age: 79
End: 2024-11-18

## 2024-11-18 NOTE — OUTREACH NOTE
Call Center TCM Note      Flowsheet Row Responses   [facility] [place] patient discharged from? [place]   Does the patient have one of the following disease processes/diagnoses(primary or secondary)? Other   TCM attempt successful? No   Unsuccessful attempts Attempt 1            Yoly Crowder MA    11/18/2024, 10:05 EST

## 2024-11-18 NOTE — TELEPHONE ENCOUNTER
Caller: Rock Maciel Jr.    Relationship to patient: Self    Best call back number: [phone]    Chief complaint: NA    Type of visit: HOSPITAL FU    Requested date: 11.21.24 12:30 PM 1:00 PM    If rescheduling, when is the original appointment: 11.21.24 11:00 AM     Additional notes:PATIENT IS WANTING LATER APPOINTMENT TIME IF POSSIBLE ON OXYGEN AND ITS HARD FOR HIM TO GET OUT, PLEASE ADVISE.

## 2024-11-18 NOTE — TELEPHONE ENCOUNTER
Caller: TOSHIA     Relationship: CARLOS KEVIN    Best call back number: [phone]    What is the best time to reach you: AN    Who are you requesting to speak with (clinical staff, provider,  specific staff member): CLINICAL        What was the call regarding: PT WAS DISCHARGED 11/15/2024 WITH OXYGEN. PLEASE SIGN ORDER FOR OXYGEN AT HOME

## 2024-11-18 NOTE — OUTREACH NOTE
Call Center TCM Note      Flowsheet Row Responses   [facility] patient discharged from? [place]   Does the patient have one of the following disease processes/diagnoses(primary or secondary)? Other   TCM attempt successful? No   Unsuccessful attempts Attempt 2            Yoly Crowder MA    11/18/2024, 15:34 EST

## 2024-11-19 ENCOUNTER — TRANSITIONAL CARE MANAGEMENT TELEPHONE ENCOUNTER (OUTPATIENT)
Dept: CALL CENTER | Facility: HOSPITAL | Age: 79
End: 2024-11-19
Payer: MEDICARE

## 2024-11-19 ENCOUNTER — TELEPHONE (OUTPATIENT)
Dept: FAMILY MEDICINE CLINIC | Facility: CLINIC | Age: 79
End: 2024-11-19

## 2024-11-19 ENCOUNTER — OFFICE VISIT (OUTPATIENT)
Dept: FAMILY MEDICINE CLINIC | Facility: CLINIC | Age: 79
End: 2024-11-19
Payer: MEDICARE

## 2024-11-19 DIAGNOSIS — N18.4 CKD (CHRONIC KIDNEY DISEASE) STAGE 4, GFR 15-29 ML/MIN: ICD-10-CM

## 2024-11-19 DIAGNOSIS — G47.34 NOCTURNAL HYPOXEMIA: ICD-10-CM

## 2024-11-19 DIAGNOSIS — N32.89 BLADDER WALL THICKENING: ICD-10-CM

## 2024-11-19 DIAGNOSIS — I48.19 ATRIAL FIBRILLATION, PERSISTENT: Chronic | ICD-10-CM

## 2024-11-19 DIAGNOSIS — E11.59 TYPE 2 DIABETES MELLITUS WITH OTHER CIRCULATORY COMPLICATION, WITHOUT LONG-TERM CURRENT USE OF INSULIN: Chronic | ICD-10-CM

## 2024-11-19 DIAGNOSIS — I10 ESSENTIAL HYPERTENSION: Chronic | ICD-10-CM

## 2024-11-19 DIAGNOSIS — G47.33 OSA (OBSTRUCTIVE SLEEP APNEA): Chronic | ICD-10-CM

## 2024-11-19 DIAGNOSIS — R09.02 HYPOXEMIA: ICD-10-CM

## 2024-11-19 DIAGNOSIS — Z09 HOSPITAL DISCHARGE FOLLOW-UP: Primary | ICD-10-CM

## 2024-11-19 DIAGNOSIS — Z79.01 CHRONIC ANTICOAGULATION: ICD-10-CM

## 2024-11-19 DIAGNOSIS — R31.9 HEMATURIA, UNSPECIFIED TYPE: ICD-10-CM

## 2024-11-19 LAB
BILIRUB BLD-MCNC: NEGATIVE MG/DL
CLARITY, POC: CLEAR
COLOR UR: YELLOW
EXPIRATION DATE: ABNORMAL
GLUCOSE UR STRIP-MCNC: NEGATIVE MG/DL
KETONES UR QL: NEGATIVE
LEUKOCYTE EST, POC: NEGATIVE
Lab: ABNORMAL
NITRITE UR-MCNC: NEGATIVE MG/ML
PH UR: 6 [PH] (ref 5–8)
PROT UR STRIP-MCNC: ABNORMAL MG/DL
RBC # UR STRIP: ABNORMAL /UL
SP GR UR: 1.01 (ref 1–1.03)
UROBILINOGEN UR QL: NORMAL

## 2024-11-19 RX ORDER — LOSARTAN POTASSIUM 25 MG/1
25 TABLET ORAL DAILY
COMMUNITY
Start: 2024-07-18 | End: 2024-11-19

## 2024-11-19 NOTE — TELEPHONE ENCOUNTER
HUB TO RELAY    Called pt to see if they can come in at 10:45 today 11/19/24 so that he can be evaluated by DR JANSEN for smart feet shoes

## 2024-11-19 NOTE — PROGRESS NOTES
Transitional Care Follow Up Visit  Subjective     Rock is a 79 y.o. male who presents for a transitional care management visit.    Within 48 business hours after discharge our office contacted him via telephone to coordinate his care and needs.      I reviewed and discussed the details of that call along with the discharge summary, hospital problems, inpatient lab results, inpatient diagnostic studies, and consultation reports with Rock.     Current outpatient and discharge medications have been reconciled for the patient.  Reviewed by: ZEINA Mccormack          11/30/2024     2:40 PM   Date of TCM Phone Call   [facility]   Date of Admission 11/8/2024   Date of Discharge 11/15/2024   Discharge Disposition Home or Self Care       Risk for Readmission (LACE) Score: 13 (11/15/2024  6:00 AM)      History of Present Illness     Course During Hospital Stay The following information was reviewed by: ZEINA Mccormack on 11/19/2024:     Hospital Course:      Please refer to H&P for full details.  Rock Maciel is an 79-year-old male who underwent a successful cardioversion and remains in sinus rhythm.  He denies any chest pain, palpitations or shortness of breath.  He will be discharged on amiodarone 200 mg twice a day and on his visit with Dr. Toney this will be decreased at that time.  He was seen by multiple providers during his stay.  He will go home on oxygen which has been delivered to the room per pulmonology.  He will follow-up with urology due to abnormal CT in the outpatient setting.  He will nephrology in 1 to 2 weeks.  He remained stable at discharge.  All providers okay with discharge.  Full details of discharge as below.    Discussed medication changes as follows:    Discharged on:    Amiodarone 200mg BID    Nitroglycerine 0.4mg SL PRN    Changes:     Eliquis 2.5mg BID    Bumex 1mg BID    Hydralazine 25mg TID    Metoprolol Succ XL 25mg daily    STOP These Meds:    Amlodipine  10mg    Losartan 50mg    Metoprolol Tartrate 25mg    Discharge Home Instructions:   1. Follow up with Dr. Maru Talavera in 1 to 2 weeks. They will call for an appointment.  CKD Stage 3, on Bumex 1mg BID, chest x-ray showed Cardiomegaly w/vascular congestion.  He has appointment to see Nephrology on 12/2/24 at 12:45pm.  2. Follow-up with Dr. Camilo El with pulmonary. They will call with an appointment time.  Hypoxemia, Nocturnal Hypoxemia - SHASTA on C-Pap - will add O2 to C-pap.  3.  Follow-up with [facility] due to abnormal CT.  Please call make an appointment.  He missed his appointment this week on Monday.  He knows he has to call and reschedule appointment this week and stated he will call and re-scheduled an appointment.  4. Follow-up with Dr. Toney on November 21, 2024 at 12:15.  5. Discussed importance of while he is on Amiodarone he will need do a check up every 6 months checkup for eye examination, thyroid function test, chest x-ray and liver function test has been advised.  6. Discussed importance of monitoring blood pressure 1 hour and a half after taking blood pressure medications and to write the reading down along with heart rate.  Please bring these home BP readings with you on your follow up appointments. Call Cardiology if sys <100, or HR <60.    Discussed Chest XR, CT Abd/Pelvis and Renal US with patient during office visit.    Copied text on this note has been reviewed and revised by me on 11/19/24.    Today, patient denies having any complaints.  He arrived in office with empty oxygen tank so provided supplemental oxygen in office today.  Also walked him out on 2 liters of oxygen.  He reports having oxygen tank at home, he only just ran out of oxygen in his portable tank.    He is requesting refill of Tradjenta today.     The following portions of the patient's history were reviewed and updated as appropriate: allergies, current medications, past family history, past medical history,  "past social history, past surgical history, and problem list.         Vitals:    11/19/24 1311 11/19/24 1403   BP: 132/54    BP Location: Left arm    Patient Position: Sitting    Cuff Size: Large Adult    Pulse: 71 68  Comment: re-checked   SpO2: 95%  Comment: had just started in office oxygen tank due to patients home oxygen being empty checked o2 stat 10 seconds after outting oxygen on 97%  Comment: while on O2 at 2lpm via nasal cannula   Weight: 119 kg (262 lb)    Height: 185.4 cm (72.99\")        Vitals:    11/19/24 1403   BP:    Pulse: 68   SpO2: 97%        Review of Systems   Constitutional:  Negative for chills and fever.   Respiratory:  Negative for shortness of breath and wheezing.    Cardiovascular:  Negative for chest pain, palpitations and leg swelling.       Objective   Physical Exam  Vitals and nursing note reviewed.   Constitutional:       General: He is not in acute distress.     Appearance: Normal appearance.   HENT:      Head: Normocephalic and atraumatic.   Cardiovascular:      Rate and Rhythm: Normal rate and regular rhythm.      Heart sounds: Normal heart sounds. No murmur heard.  Pulmonary:      Effort: Pulmonary effort is normal. No respiratory distress.      Breath sounds: Normal breath sounds. No wheezing or rales.   Musculoskeletal:      Right lower leg: No edema.      Left lower leg: No edema.   Skin:     General: Skin is warm.   Neurological:      Mental Status: He is alert.       Assessment & Plan     Diagnoses and all orders for this visit:    1. Hospital discharge follow-up (Primary)  Comments:  Hospital follow-up on Persistent Atrial Fibrillation.    2. Atrial fibrillation, persistent  Assessment & Plan:  Controlled on Amiodarone and Metoprolol Succ XL.  Discussed importance of while he is on Amiodarone he will need do a check up every 6 months checkup for eye examination, thyroid function test, chest x-ray and liver function test has been advised.   Followed by Dr. Toney, next " appointment on 11/21/24, at 12:15.        3. Essential hypertension  Assessment & Plan:  Hypertension is stable on Amiodarone 200mg BID (will be reduced to 1 tab daily), Bumex 1mg BID, Hydralyzine 25mg TID, and Metoprolol Succ XL 25mg daily.  Continue current treatment plan.  Decrease table salt and foods high in salt.  Weight loss.  Increase activity daily.  Discussed importance of monitoring blood pressure 1 hour and a half after taking blood pressure medications and to write the reading down along with heart rate.  Please bring these home BP readings with you on your follow up appointments. Call Cardiology if sys <100, or HR <60.  Blood pressure will be re-assessed in 1 month.      4. Type 2 diabetes mellitus with other circulatory complication, without long-term current use of insulin  -     linagliptin (Tradjenta) 5 MG tablet tablet; Take 1 tablet by mouth Daily.  Dispense: 90 tablet; Refill: 1    5. CKD (chronic kidney disease) stage 4, GFR 15-29 ml/min  Assessment & Plan:  Uncontrolled; Cr. 2.69, eGFR 23.2.  Taking Bumex 1mg BID.  He has appointment to see Dr. Maru Talavera, Nephrology on 12/2/24 at 12:45pm.  Encouraged to attend follow-up appointment.        6. Chronic anticoagulation  Assessment & Plan:  Stable on Eliquis 2.5mg BID.  Continue current treatment plan.  Will continue to monitor.      7. Hematuria, unspecified type  Assessment & Plan:  Hematuria on prior lab.  Order:  POCT Urinalysis  Order:  Urine Culture  Will call with result and plan.  Encouraged to call and schedule appointment to follow-up with Urology and he agreed.      Orders:  -     Urine Culture - Urine, Urine, Clean Catch  -     POCT urinalysis dipstick, automated    8. Bladder wall thickening  Assessment & Plan:  Discussed importance of following-up with [facility] due to abnormal CT.  He missed his appointment this week on Monday.  He knows he has to call and reschedule appointment this week and stated he will call and  re-scheduled the appointment.    Orders:  -     Urine Culture - Urine, Urine, Clean Catch    9. Hypoxemia  Assessment & Plan:  Stable on Oxygen at 2lpm via nasal cannula.  Encouraged to follow-up with Dr. Camilo El, Pulmonology.      10. Nocturnal hypoxemia  Assessment & Plan:  SHASTA stable on C-Pap with supplemental oxygen.  Encouraged to follow-up with Dr. Camilo El, Pulmonology.      11. SHASTA (obstructive sleep apnea)  Assessment & Plan:  Controlled on C-pap.  Encouraged continued use.  Follow-up with Dr. Camilo El, Pulmonology.            Follow Up     Return in about 1 month (around 12/19/2024) for Next scheduled follow up - Chronic Care .

## 2024-11-19 NOTE — OUTREACH NOTE
Call Center TCM Note      Flowsheet Row Responses   [facility] patient discharged from? [place]   Does the patient have one of the following disease processes/diagnoses(primary or secondary)? Other   TCM attempt successful? Yes   Discharge diagnosis Persistent atrial fibrillation  Principal problem   TCM call completed? Yes   Wrap up additional comments D/C DX: persistent Afib - Pt discharged from [facility] 11/15/2024, today completed FACE TO FACE TCM APPT with PCP ZEINA Dickson. Two failed TCM call attempts were made 11/18/2024            Yoly Crowder MA    11/19/2024, 13:33 EST

## 2024-11-21 ENCOUNTER — OFFICE VISIT (OUTPATIENT)
Dept: CARDIOLOGY | Facility: CLINIC | Age: 79
End: 2024-11-21
Payer: MEDICARE

## 2024-11-21 VITALS
SYSTOLIC BLOOD PRESSURE: 168 MMHG | DIASTOLIC BLOOD PRESSURE: 62 MMHG | OXYGEN SATURATION: 94 % | BODY MASS INDEX: 34.72 KG/M2 | HEIGHT: 73 IN | WEIGHT: 262 LBS | HEART RATE: 72 BPM

## 2024-11-21 DIAGNOSIS — Z79.899 ON AMIODARONE THERAPY: Primary | ICD-10-CM

## 2024-11-21 DIAGNOSIS — I48.19 ATRIAL FIBRILLATION, PERSISTENT: ICD-10-CM

## 2024-11-21 DIAGNOSIS — Z79.01 CHRONIC ANTICOAGULATION: ICD-10-CM

## 2024-11-21 DIAGNOSIS — I10 ESSENTIAL HYPERTENSION: ICD-10-CM

## 2024-11-21 DIAGNOSIS — E78.5 HYPERLIPIDEMIA, UNSPECIFIED HYPERLIPIDEMIA TYPE: ICD-10-CM

## 2024-11-21 LAB
BACTERIA UR CULT: NORMAL
BACTERIA UR CULT: NORMAL

## 2024-11-21 RX ORDER — AMIODARONE HYDROCHLORIDE 200 MG/1
200 TABLET ORAL DAILY
Qty: 60 TABLET | Refills: 1 | Status: SHIPPED | OUTPATIENT
Start: 2024-11-21

## 2024-11-21 NOTE — PROGRESS NOTES
Subjective:        Rock Maciel Jr. is a 79 y.o. male who here for follow up    CC  Follow-up atrial fibrillation hypertension anticoagulation  HPI  79-year-old male with paroxysmal atrial fibrillation underwent cardioversion has been doing well denies any chest pains or tightness in the chest     Problems Addressed this Visit          Cardiac and Vasculature    Essential hypertension    Hyperlipidemia    Atrial fibrillation, persistent       Coag and Thromboembolic    Chronic anticoagulation     Other Visit Diagnoses       On amiodarone therapy    -  Primary    Relevant Orders    XR Chest 2 View    TSH    Comprehensive Metabolic Panel          Diagnoses         Codes Comments    On amiodarone therapy    -  Primary ICD-10-CM: Z79.899  ICD-9-CM: V58.69     Atrial fibrillation, persistent     ICD-10-CM: I48.19  ICD-9-CM: 427.31     Hyperlipidemia, unspecified hyperlipidemia type     ICD-10-CM: E78.5  ICD-9-CM: 272.4     Essential hypertension     ICD-10-CM: I10  ICD-9-CM: 401.9     Chronic anticoagulation     ICD-10-CM: Z79.01  ICD-9-CM: V58.61           .    The following portions of the patient's history were reviewed and updated as appropriate: allergies, current medications, past family history, past medical history, past social history, past surgical history and problem list.    Past Medical History:   Diagnosis Date    Allergic rhinitis     Bronchitis     Coronary artery disease     Diabetes mellitus 2001    DM (diabetes mellitus)     Encounter for annual health examination 05/06/2014    Annual Health Assessment    Gout     Hiatal hernia     History of MRSA infection     RIGHT FOOT 2010    Hyperlipidemia     Hypertension     Murmur     Pneumothorax on right     Sleep apnea     pt wears CPAP at night    Wellness examination 06/24/2015    Annual Wellness Visit     reports that he has never smoked. He has never been exposed to tobacco smoke. He has never used smokeless tobacco. He reports current alcohol use.  "He reports that he does not use drugs.   Family History   Problem Relation Age of Onset    Hypertension Father        Review of Systems  Constitutional: No wt loss, fever, fatigue  Gastrointestinal: No nausea, abdominal pain  Behavioral/Psych: No insomnia or anxiety   Cardiovascular no chest pains or tightness in the chest  Objective:       Physical Exam  /62   Pulse 72   Ht 185.4 cm (72.99\")   Wt 119 kg (262 lb)   SpO2 94%   BMI 34.57 kg/m²   General appearance: No acute changes   Neck: Trachea midline; NECK, supple, no thyromegaly or lymphadenopathy   Lungs: Normal size and shape, normal breath sounds, equal distribution of air, no rales and rhonchi   CV: S1-S2 regular, no murmurs, no rub, no gallop   Abdomen: Soft, nontender; no masses , no abnormal abdominal sounds   Extremities: No deformity , normal color , no peripheral edema   Skin: Normal temperature, turgor and texture; no rash, ulcers          Procedures      Echocardiogram:    Results for orders placed during the hospital encounter of 10/03/24    Adult Transthoracic Echo Complete W/ Cont if Necessary Per Protocol    Interpretation Summary    Left ventricular ejection fraction appears to be 61 - 65%.    Left ventricular diastolic function was indeterminate.    The left atrial cavity is moderately dilated.    Left atrial volume is moderately increased.    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    Estimated right ventricular systolic pressure from tricuspid regurgitation is moderately elevated (45-55 mmHg).        No current facility-administered medications for this visit.  No current outpatient medications on file.    Facility-Administered Medications Ordered in Other Visits:     acetaminophen (TYLENOL) tablet 650 mg, 650 mg, Oral, Q4H PRN **OR** acetaminophen (TYLENOL) 160 MG/5ML oral solution 650 mg, 650 mg, Oral, Q4H PRN **OR** acetaminophen (TYLENOL) suppository 650 mg, 650 mg, Rectal, Q4H PRN, Sharita Roche, APRN    " amiodarone (PACERONE) tablet 200 mg, 200 mg, Oral, Daily, Bishop Chakraborty MD, 200 mg at 11/27/24 0851    ascorbic acid (VITAMIN C) tablet 250 mg, 250 mg, Oral, Daily, Bishop Chakraborty MD, 250 mg at 11/27/24 0851    atorvastatin (LIPITOR) tablet 20 mg, 20 mg, Oral, Nightly, Bishop Chakraborty MD, 20 mg at 11/26/24 2034    sennosides-docusate (PERICOLACE) 8.6-50 MG per tablet 2 tablet, 2 tablet, Oral, BID PRN, 2 tablet at 11/26/24 0538 **AND** polyethylene glycol (MIRALAX) packet 17 g, 17 g, Oral, Daily PRN, 17 g at 11/26/24 0930 **AND** bisacodyl (DULCOLAX) EC tablet 5 mg, 5 mg, Oral, Daily PRN **AND** bisacodyl (DULCOLAX) suppository 10 mg, 10 mg, Rectal, Daily PRN, Sharita Roche APRN    calcium carbonate (TUMS) chewable tablet 500 mg (200 mg elemental), 2 tablet, Oral, BID PRN, Sharita Roche APRN    cholecalciferol (VITAMIN D3) tablet 1,000 Units, 1,000 Units, Oral, Daily, Bishop Chakraborty MD, 1,000 Units at 11/27/24 0851    dextrose (D50W) (25 g/50 mL) IV injection 25 g, 25 g, Intravenous, Q15 Min PRN, Sharita Roche APRN    dextrose (GLUTOSE) oral gel 15 g, 15 g, Oral, Q15 Min PRN, Sharita Roche APRN    glucagon (GLUCAGEN) injection 1 mg, 1 mg, Intramuscular, Q15 Min PRN, Sharita Roche APRN    insulin lispro (HUMALOG/ADMELOG) injection 2-7 Units, 2-7 Units, Subcutaneous, 4x Daily AC & at Bedtime, Sharita Roche APRN    labetalol (NORMODYNE,TRANDATE) injection 10 mg, 10 mg, Intravenous, Q2H PRN, Bishop Chakraborty MD    linagliptin (TRADJENTA) tablet 5 mg, 5 mg, Oral, Daily, Bishop Chakraborty MD, 5 mg at 11/27/24 0851    Menthol-Zinc Oxide 1 Application, 1 Application, Topical, Q12H, Bishop Chakraborty MD, 1 Application at 11/27/24 0851    metoprolol succinate XL (TOPROL-XL) 24 hr tablet 25 mg, 25 mg, Oral, Daily, Bishop Chakraborty MD, 25 mg at 11/26/24 0928    ondansetron ODT (ZOFRAN-ODT) disintegrating tablet 4  mg, 4 mg, Oral, Q6H PRN **OR** ondansetron (ZOFRAN) injection 4 mg, 4 mg, Intravenous, Q6H PRN, Sharita Roche APRN    sodium chloride 0.9 % flush 10 mL, 10 mL, Intravenous, Q12H, Sharita Roche, APRN, 10 mL at 11/27/24 0851    sodium chloride 0.9 % flush 10 mL, 10 mL, Intravenous, PRN, Sharita Roche APRMIEK    sodium chloride 0.9 % infusion 40 mL, 40 mL, Intravenous, PRN, Sharita Roche, APRN    terazosin (HYTRIN) capsule 1 mg, 1 mg, Oral, Nightly, Bishop Chakraborty MD, 1 mg at 11/26/24 2034   Assessment:                Plan:          ICD-10-CM ICD-9-CM   1. On amiodarone therapy  Z79.899 V58.69   2. Atrial fibrillation, persistent  I48.19 427.31   3. Hyperlipidemia, unspecified hyperlipidemia type  E78.5 272.4   4. Essential hypertension  I10 401.9   5. Chronic anticoagulation  Z79.01 V58.61     1. On amiodarone therapy  Rock Maciel Jr. IS ON AMIODARONE,   Significant side effects associated with this medication has been explained     Pros and cons of the medications has been discussed, decision has been to continue the medication   6 months to yearly checkup for eye examination, thyroid function test, chest x-ray and liver function test has been advised    Patient understands well    - XR Chest 2 View; Future  - TSH; Future  - Comprehensive Metabolic Panel; Future    2. Atrial fibrillation, persistent  Under control    3. Hyperlipidemia, unspecified hyperlipidemia type  Continue current treatment    4. Essential hypertension  Continue current treatment    5. Chronic anticoagulation  Continue current treatment    POST CV DOING WELL    REDUCE AMIODARONE 200 MG PO DAILY    6 MONTHS WITH AMIO CHECK  COUNSELING:    Rock Maciel Jr.Counseling was given to patient for the following topics: diagnostic results, risk factor reductions, impressions, risks and benefits of treatment options and importance of treatment compliance .       SMOKING COUNSELING:        Dictated using Dragon  dictation

## 2024-11-25 ENCOUNTER — APPOINTMENT (OUTPATIENT)
Dept: CT IMAGING | Facility: HOSPITAL | Age: 79
DRG: 351 | End: 2024-11-25
Payer: MEDICARE

## 2024-11-25 ENCOUNTER — HOSPITAL ENCOUNTER (INPATIENT)
Facility: HOSPITAL | Age: 79
LOS: 4 days | Discharge: HOME | DRG: 351 | End: 2024-11-30
Attending: EMERGENCY MEDICINE | Admitting: INTERNAL MEDICINE
Payer: MEDICARE

## 2024-11-25 ENCOUNTER — READMISSION MANAGEMENT (OUTPATIENT)
Dept: CALL CENTER | Facility: HOSPITAL | Age: 79
End: 2024-11-25
Payer: MEDICARE

## 2024-11-25 DIAGNOSIS — M14.60 NEUROPATHIC ARTHROPATHY: ICD-10-CM

## 2024-11-25 DIAGNOSIS — I48.19 PERSISTENT ATRIAL FIBRILLATION: ICD-10-CM

## 2024-11-25 DIAGNOSIS — K40.30 INCARCERATED INGUINAL HERNIA: Primary | ICD-10-CM

## 2024-11-25 DIAGNOSIS — M10.9 GOUT, UNSPECIFIED CAUSE, UNSPECIFIED CHRONICITY, UNSPECIFIED SITE: ICD-10-CM

## 2024-11-25 DIAGNOSIS — K56.609 SBO (SMALL BOWEL OBSTRUCTION): ICD-10-CM

## 2024-11-25 DIAGNOSIS — N17.9 AKI (ACUTE KIDNEY INJURY): ICD-10-CM

## 2024-11-25 LAB
ALBUMIN SERPL-MCNC: 4.1 G/DL (ref 3.5–5.2)
ALBUMIN/GLOB SERPL: 1.1 G/DL
ALP SERPL-CCNC: 115 U/L (ref 39–117)
ALT SERPL W P-5'-P-CCNC: 16 U/L (ref 1–41)
ANION GAP SERPL CALCULATED.3IONS-SCNC: 13 MMOL/L (ref 5–15)
AST SERPL-CCNC: 8 U/L (ref 1–40)
BASOPHILS # BLD AUTO: 0.02 10*3/MM3 (ref 0–0.2)
BASOPHILS NFR BLD AUTO: 0.2 % (ref 0–1.5)
BILIRUB SERPL-MCNC: 0.5 MG/DL (ref 0–1.2)
BUN SERPL-MCNC: 71 MG/DL (ref 8–23)
BUN/CREAT SERPL: 21.4 (ref 7–25)
CALCIUM SPEC-SCNC: 9.5 MG/DL (ref 8.6–10.5)
CHLORIDE SERPL-SCNC: 91 MMOL/L (ref 98–107)
CO2 SERPL-SCNC: 29 MMOL/L (ref 22–29)
CREAT SERPL-MCNC: 3.32 MG/DL (ref 0.76–1.27)
DEPRECATED RDW RBC AUTO: 46.8 FL (ref 37–54)
EGFRCR SERPLBLD CKD-EPI 2021: 18 ML/MIN/1.73
EOSINOPHIL # BLD AUTO: 0.08 10*3/MM3 (ref 0–0.4)
EOSINOPHIL NFR BLD AUTO: 0.7 % (ref 0.3–6.2)
ERYTHROCYTE [DISTWIDTH] IN BLOOD BY AUTOMATED COUNT: 14.2 % (ref 12.3–15.4)
GLOBULIN UR ELPH-MCNC: 3.7 GM/DL
GLUCOSE SERPL-MCNC: 175 MG/DL (ref 65–99)
HCT VFR BLD AUTO: 33.5 % (ref 37.5–51)
HGB BLD-MCNC: 10.8 G/DL (ref 13–17.7)
IMM GRANULOCYTES # BLD AUTO: 0.06 10*3/MM3 (ref 0–0.05)
IMM GRANULOCYTES NFR BLD AUTO: 0.5 % (ref 0–0.5)
LIPASE SERPL-CCNC: 43 U/L (ref 13–60)
LYMPHOCYTES # BLD AUTO: 0.71 10*3/MM3 (ref 0.7–3.1)
LYMPHOCYTES NFR BLD AUTO: 6.3 % (ref 19.6–45.3)
MCH RBC QN AUTO: 29.2 PG (ref 26.6–33)
MCHC RBC AUTO-ENTMCNC: 32.2 G/DL (ref 31.5–35.7)
MCV RBC AUTO: 90.5 FL (ref 79–97)
MONOCYTES # BLD AUTO: 0.68 10*3/MM3 (ref 0.1–0.9)
MONOCYTES NFR BLD AUTO: 6 % (ref 5–12)
NEUTROPHILS NFR BLD AUTO: 86.3 % (ref 42.7–76)
NEUTROPHILS NFR BLD AUTO: 9.74 10*3/MM3 (ref 1.7–7)
NRBC BLD AUTO-RTO: 0 /100 WBC (ref 0–0.2)
PLATELET # BLD AUTO: 304 10*3/MM3 (ref 140–450)
PMV BLD AUTO: 9.9 FL (ref 6–12)
POTASSIUM SERPL-SCNC: 3.7 MMOL/L (ref 3.5–5.2)
PROT SERPL-MCNC: 7.8 G/DL (ref 6–8.5)
RBC # BLD AUTO: 3.7 10*6/MM3 (ref 4.14–5.8)
SODIUM SERPL-SCNC: 133 MMOL/L (ref 136–145)
WBC NRBC COR # BLD AUTO: 11.29 10*3/MM3 (ref 3.4–10.8)

## 2024-11-25 PROCEDURE — 85025 COMPLETE CBC W/AUTO DIFF WBC: CPT | Performed by: EMERGENCY MEDICINE

## 2024-11-25 PROCEDURE — 99285 EMERGENCY DEPT VISIT HI MDM: CPT

## 2024-11-25 PROCEDURE — 80053 COMPREHEN METABOLIC PANEL: CPT | Performed by: EMERGENCY MEDICINE

## 2024-11-25 PROCEDURE — 74176 CT ABD & PELVIS W/O CONTRAST: CPT

## 2024-11-25 PROCEDURE — 83690 ASSAY OF LIPASE: CPT | Performed by: EMERGENCY MEDICINE

## 2024-11-25 PROCEDURE — 25810000003 LACTATED RINGERS SOLUTION: Performed by: EMERGENCY MEDICINE

## 2024-11-25 RX ADMIN — SODIUM CHLORIDE, POTASSIUM CHLORIDE, SODIUM LACTATE AND CALCIUM CHLORIDE 500 ML: 600; 310; 30; 20 INJECTION, SOLUTION INTRAVENOUS at 23:52

## 2024-11-25 NOTE — OUTREACH NOTE
Medical Week 2 Survey      Flowsheet Row Responses   [facility] patient discharged from? [place]   Does the patient have one of the following disease processes/diagnoses(primary or secondary)? Other   Week 2 attempt successful? No   Unsuccessful attempts Attempt 1            Brittany ONOFRE - Registered Nurse

## 2024-11-26 ENCOUNTER — READMISSION MANAGEMENT (OUTPATIENT)
Dept: CALL CENTER | Facility: HOSPITAL | Age: 79
End: 2024-11-26
Payer: MEDICARE

## 2024-11-26 PROBLEM — N18.32 STAGE 3B CHRONIC KIDNEY DISEASE: Status: ACTIVE | Noted: 2023-09-22

## 2024-11-26 PROBLEM — K40.30 INCARCERATED INGUINAL HERNIA: Status: ACTIVE | Noted: 2024-11-26

## 2024-11-26 LAB
ANION GAP SERPL CALCULATED.3IONS-SCNC: 11.5 MMOL/L (ref 5–15)
BACTERIA UR QL AUTO: ABNORMAL /HPF
BILIRUB UR QL STRIP: NEGATIVE
BUN SERPL-MCNC: 66 MG/DL (ref 8–23)
BUN/CREAT SERPL: 23.2 (ref 7–25)
CALCIUM SPEC-SCNC: 8.5 MG/DL (ref 8.6–10.5)
CHLORIDE SERPL-SCNC: 95 MMOL/L (ref 98–107)
CHLORIDE UR-SCNC: 26 MMOL/L
CLARITY UR: CLEAR
CO2 SERPL-SCNC: 30.5 MMOL/L (ref 22–29)
COLOR UR: YELLOW
CREAT SERPL-MCNC: 2.85 MG/DL (ref 0.76–1.27)
DEPRECATED RDW RBC AUTO: 45.6 FL (ref 37–54)
EGFRCR SERPLBLD CKD-EPI 2021: 21.7 ML/MIN/1.73
ERYTHROCYTE [DISTWIDTH] IN BLOOD BY AUTOMATED COUNT: 14 % (ref 12.3–15.4)
GLUCOSE BLDC GLUCOMTR-MCNC: 107 MG/DL (ref 70–130)
GLUCOSE BLDC GLUCOMTR-MCNC: 115 MG/DL (ref 70–130)
GLUCOSE BLDC GLUCOMTR-MCNC: 134 MG/DL (ref 70–130)
GLUCOSE BLDC GLUCOMTR-MCNC: 140 MG/DL (ref 70–130)
GLUCOSE SERPL-MCNC: 111 MG/DL (ref 65–99)
GLUCOSE UR STRIP-MCNC: NEGATIVE MG/DL
HCT VFR BLD AUTO: 30.2 % (ref 37.5–51)
HGB BLD-MCNC: 9.4 G/DL (ref 13–17.7)
HGB UR QL STRIP.AUTO: ABNORMAL
HYALINE CASTS UR QL AUTO: ABNORMAL /LPF
IRON 24H UR-MRATE: 34 MCG/DL (ref 59–158)
IRON SATN MFR SERPL: 11 % (ref 20–50)
KETONES UR QL STRIP: NEGATIVE
LEUKOCYTE ESTERASE UR QL STRIP.AUTO: NEGATIVE
MCH RBC QN AUTO: 27.7 PG (ref 26.6–33)
MCHC RBC AUTO-ENTMCNC: 31.1 G/DL (ref 31.5–35.7)
MCV RBC AUTO: 89.1 FL (ref 79–97)
NITRITE UR QL STRIP: NEGATIVE
OSMOLALITY UR: 334 MOSM/KG
PH UR STRIP.AUTO: 5.5 [PH] (ref 5–8)
PLATELET # BLD AUTO: 277 10*3/MM3 (ref 140–450)
PMV BLD AUTO: 9.8 FL (ref 6–12)
POTASSIUM SERPL-SCNC: 3.5 MMOL/L (ref 3.5–5.2)
POTASSIUM UR-SCNC: 21.8 MMOL/L
PROT UR QL STRIP: ABNORMAL
QT INTERVAL: 454 MS
QTC INTERVAL: 521 MS
RBC # BLD AUTO: 3.39 10*6/MM3 (ref 4.14–5.8)
RBC # UR STRIP: ABNORMAL /HPF
REF LAB TEST METHOD: ABNORMAL
SODIUM SERPL-SCNC: 137 MMOL/L (ref 136–145)
SODIUM UR-SCNC: 39 MMOL/L
SP GR UR STRIP: 1.01 (ref 1–1.03)
SQUAMOUS #/AREA URNS HPF: ABNORMAL /HPF
TIBC SERPL-MCNC: 320 MCG/DL (ref 298–536)
TRANSFERRIN SERPL-MCNC: 215 MG/DL (ref 200–360)
UROBILINOGEN UR QL STRIP: ABNORMAL
UUN 24H UR-MCNC: 495 MG/DL
WBC # UR STRIP: ABNORMAL /HPF
WBC NRBC COR # BLD AUTO: 9.9 10*3/MM3 (ref 3.4–10.8)

## 2024-11-26 PROCEDURE — 81001 URINALYSIS AUTO W/SCOPE: CPT | Performed by: EMERGENCY MEDICINE

## 2024-11-26 PROCEDURE — 84540 ASSAY OF URINE/UREA-N: CPT | Performed by: NURSE PRACTITIONER

## 2024-11-26 PROCEDURE — 36415 COLL VENOUS BLD VENIPUNCTURE: CPT | Performed by: NURSE PRACTITIONER

## 2024-11-26 PROCEDURE — 83935 ASSAY OF URINE OSMOLALITY: CPT | Performed by: NURSE PRACTITIONER

## 2024-11-26 PROCEDURE — 25010000002 MIDAZOLAM PER 1 MG: Performed by: EMERGENCY MEDICINE

## 2024-11-26 PROCEDURE — 93010 ELECTROCARDIOGRAM REPORT: CPT | Performed by: INTERNAL MEDICINE

## 2024-11-26 PROCEDURE — 99223 1ST HOSP IP/OBS HIGH 75: CPT | Performed by: STUDENT IN AN ORGANIZED HEALTH CARE EDUCATION/TRAINING PROGRAM

## 2024-11-26 PROCEDURE — 82948 REAGENT STRIP/BLOOD GLUCOSE: CPT

## 2024-11-26 PROCEDURE — 25810000003 SODIUM CHLORIDE 0.9 % SOLUTION: Performed by: INTERNAL MEDICINE

## 2024-11-26 PROCEDURE — 83540 ASSAY OF IRON: CPT | Performed by: NURSE PRACTITIONER

## 2024-11-26 PROCEDURE — 84300 ASSAY OF URINE SODIUM: CPT | Performed by: NURSE PRACTITIONER

## 2024-11-26 PROCEDURE — 97162 PT EVAL MOD COMPLEX 30 MIN: CPT

## 2024-11-26 PROCEDURE — 82436 ASSAY OF URINE CHLORIDE: CPT | Performed by: NURSE PRACTITIONER

## 2024-11-26 PROCEDURE — 97165 OT EVAL LOW COMPLEX 30 MIN: CPT

## 2024-11-26 PROCEDURE — 97535 SELF CARE MNGMENT TRAINING: CPT

## 2024-11-26 PROCEDURE — 97530 THERAPEUTIC ACTIVITIES: CPT

## 2024-11-26 PROCEDURE — 84466 ASSAY OF TRANSFERRIN: CPT | Performed by: NURSE PRACTITIONER

## 2024-11-26 PROCEDURE — 80048 BASIC METABOLIC PNL TOTAL CA: CPT | Performed by: NURSE PRACTITIONER

## 2024-11-26 PROCEDURE — 93005 ELECTROCARDIOGRAM TRACING: CPT | Performed by: STUDENT IN AN ORGANIZED HEALTH CARE EDUCATION/TRAINING PROGRAM

## 2024-11-26 PROCEDURE — 84133 ASSAY OF URINE POTASSIUM: CPT | Performed by: NURSE PRACTITIONER

## 2024-11-26 PROCEDURE — 85027 COMPLETE CBC AUTOMATED: CPT | Performed by: NURSE PRACTITIONER

## 2024-11-26 RX ORDER — INSULIN LISPRO 100 [IU]/ML
2-7 INJECTION, SOLUTION INTRAVENOUS; SUBCUTANEOUS
Status: DISCONTINUED | OUTPATIENT
Start: 2024-11-26 | End: 2024-11-30 | Stop reason: HOSPADM

## 2024-11-26 RX ORDER — ASCORBIC ACID 500 MG
250 TABLET ORAL DAILY
Status: DISCONTINUED | OUTPATIENT
Start: 2024-11-26 | End: 2024-11-30 | Stop reason: HOSPADM

## 2024-11-26 RX ORDER — TERAZOSIN 1 MG/1
1 CAPSULE ORAL NIGHTLY
Status: DISCONTINUED | OUTPATIENT
Start: 2024-11-26 | End: 2024-11-30 | Stop reason: HOSPADM

## 2024-11-26 RX ORDER — TERAZOSIN 5 MG/1
5 CAPSULE ORAL NIGHTLY
Status: DISCONTINUED | OUTPATIENT
Start: 2024-11-26 | End: 2024-11-26

## 2024-11-26 RX ORDER — DEXTROSE MONOHYDRATE 25 G/50ML
25 INJECTION, SOLUTION INTRAVENOUS
Status: DISCONTINUED | OUTPATIENT
Start: 2024-11-26 | End: 2024-11-30 | Stop reason: HOSPADM

## 2024-11-26 RX ORDER — SODIUM CHLORIDE 0.9 % (FLUSH) 0.9 %
10 SYRINGE (ML) INJECTION EVERY 12 HOURS SCHEDULED
Status: DISCONTINUED | OUTPATIENT
Start: 2024-11-26 | End: 2024-11-30 | Stop reason: HOSPADM

## 2024-11-26 RX ORDER — CHOLECALCIFEROL (VITAMIN D3) 25 MCG
1000 TABLET ORAL DAILY
Status: DISCONTINUED | OUTPATIENT
Start: 2024-11-26 | End: 2024-11-30 | Stop reason: HOSPADM

## 2024-11-26 RX ORDER — CALCIUM CARBONATE 500 MG/1
2 TABLET, CHEWABLE ORAL 2 TIMES DAILY PRN
Status: DISCONTINUED | OUTPATIENT
Start: 2024-11-26 | End: 2024-11-30 | Stop reason: HOSPADM

## 2024-11-26 RX ORDER — POLYETHYLENE GLYCOL 3350 17 G/17G
17 POWDER, FOR SOLUTION ORAL DAILY PRN
Status: DISCONTINUED | OUTPATIENT
Start: 2024-11-26 | End: 2024-11-30 | Stop reason: HOSPADM

## 2024-11-26 RX ORDER — MIDAZOLAM HYDROCHLORIDE 1 MG/ML
2 INJECTION, SOLUTION INTRAMUSCULAR; INTRAVENOUS ONCE
Status: COMPLETED | OUTPATIENT
Start: 2024-11-26 | End: 2024-11-26

## 2024-11-26 RX ORDER — BISACODYL 5 MG/1
5 TABLET, DELAYED RELEASE ORAL DAILY PRN
Status: DISCONTINUED | OUTPATIENT
Start: 2024-11-26 | End: 2024-11-30 | Stop reason: HOSPADM

## 2024-11-26 RX ORDER — ACETAMINOPHEN 325 MG/1
650 TABLET ORAL EVERY 4 HOURS PRN
Status: DISCONTINUED | OUTPATIENT
Start: 2024-11-26 | End: 2024-11-30 | Stop reason: HOSPADM

## 2024-11-26 RX ORDER — AMOXICILLIN 250 MG
2 CAPSULE ORAL 2 TIMES DAILY PRN
Status: DISCONTINUED | OUTPATIENT
Start: 2024-11-26 | End: 2024-11-30 | Stop reason: HOSPADM

## 2024-11-26 RX ORDER — BISACODYL 10 MG
10 SUPPOSITORY, RECTAL RECTAL DAILY PRN
Status: DISCONTINUED | OUTPATIENT
Start: 2024-11-26 | End: 2024-11-30 | Stop reason: HOSPADM

## 2024-11-26 RX ORDER — IBUPROFEN 600 MG/1
1 TABLET ORAL
Status: DISCONTINUED | OUTPATIENT
Start: 2024-11-26 | End: 2024-11-30 | Stop reason: HOSPADM

## 2024-11-26 RX ORDER — POLYETHYLENE GLYCOL 3350 17 G/17G
17 POWDER, FOR SOLUTION ORAL ONCE
Status: COMPLETED | OUTPATIENT
Start: 2024-11-26 | End: 2024-11-26

## 2024-11-26 RX ORDER — ATORVASTATIN CALCIUM 20 MG/1
20 TABLET, FILM COATED ORAL NIGHTLY
Status: DISCONTINUED | OUTPATIENT
Start: 2024-11-26 | End: 2024-11-30 | Stop reason: HOSPADM

## 2024-11-26 RX ORDER — SODIUM CHLORIDE 9 MG/ML
100 INJECTION, SOLUTION INTRAVENOUS CONTINUOUS
Status: ACTIVE | OUTPATIENT
Start: 2024-11-26 | End: 2024-11-26

## 2024-11-26 RX ORDER — NICOTINE POLACRILEX 4 MG
15 LOZENGE BUCCAL
Status: DISCONTINUED | OUTPATIENT
Start: 2024-11-26 | End: 2024-11-30 | Stop reason: HOSPADM

## 2024-11-26 RX ORDER — ACETAMINOPHEN 160 MG/5ML
650 SOLUTION ORAL EVERY 4 HOURS PRN
Status: DISCONTINUED | OUTPATIENT
Start: 2024-11-26 | End: 2024-11-30 | Stop reason: HOSPADM

## 2024-11-26 RX ORDER — METOPROLOL SUCCINATE 25 MG/1
25 TABLET, EXTENDED RELEASE ORAL DAILY
Status: DISCONTINUED | OUTPATIENT
Start: 2024-11-26 | End: 2024-11-30 | Stop reason: HOSPADM

## 2024-11-26 RX ORDER — ONDANSETRON 2 MG/ML
4 INJECTION INTRAMUSCULAR; INTRAVENOUS EVERY 6 HOURS PRN
Status: DISCONTINUED | OUTPATIENT
Start: 2024-11-26 | End: 2024-11-30 | Stop reason: HOSPADM

## 2024-11-26 RX ORDER — LABETALOL HYDROCHLORIDE 5 MG/ML
10 INJECTION, SOLUTION INTRAVENOUS
Status: DISCONTINUED | OUTPATIENT
Start: 2024-11-26 | End: 2024-11-30 | Stop reason: HOSPADM

## 2024-11-26 RX ORDER — ONDANSETRON 4 MG/1
4 TABLET, ORALLY DISINTEGRATING ORAL EVERY 6 HOURS PRN
Status: DISCONTINUED | OUTPATIENT
Start: 2024-11-26 | End: 2024-11-30 | Stop reason: HOSPADM

## 2024-11-26 RX ORDER — SODIUM CHLORIDE 9 MG/ML
40 INJECTION, SOLUTION INTRAVENOUS AS NEEDED
Status: DISCONTINUED | OUTPATIENT
Start: 2024-11-26 | End: 2024-11-30 | Stop reason: HOSPADM

## 2024-11-26 RX ORDER — AMIODARONE HYDROCHLORIDE 200 MG/1
200 TABLET ORAL DAILY
Status: DISCONTINUED | OUTPATIENT
Start: 2024-11-26 | End: 2024-11-30 | Stop reason: HOSPADM

## 2024-11-26 RX ORDER — ACETAMINOPHEN 650 MG/1
650 SUPPOSITORY RECTAL EVERY 4 HOURS PRN
Status: DISCONTINUED | OUTPATIENT
Start: 2024-11-26 | End: 2024-11-30 | Stop reason: HOSPADM

## 2024-11-26 RX ORDER — SODIUM CHLORIDE 0.9 % (FLUSH) 0.9 %
10 SYRINGE (ML) INJECTION AS NEEDED
Status: DISCONTINUED | OUTPATIENT
Start: 2024-11-26 | End: 2024-11-30 | Stop reason: HOSPADM

## 2024-11-26 RX ADMIN — SENNOSIDES AND DOCUSATE SODIUM 2 TABLET: 50; 8.6 TABLET ORAL at 05:38

## 2024-11-26 RX ADMIN — TERAZOSIN 1 MG: 1 CAPSULE ORAL at 20:34

## 2024-11-26 RX ADMIN — SODIUM CHLORIDE 50 ML/HR: 9 INJECTION, SOLUTION INTRAVENOUS at 07:32

## 2024-11-26 RX ADMIN — Medication 1 APPLICATION: at 12:23

## 2024-11-26 RX ADMIN — POLYETHYLENE GLYCOL 3350 17 G: 17 POWDER, FOR SOLUTION ORAL at 01:28

## 2024-11-26 RX ADMIN — ATORVASTATIN CALCIUM 20 MG: 20 TABLET, FILM COATED ORAL at 20:34

## 2024-11-26 RX ADMIN — LINAGLIPTIN 5 MG: 5 TABLET, FILM COATED ORAL at 10:52

## 2024-11-26 RX ADMIN — Medication 1000 UNITS: at 09:28

## 2024-11-26 RX ADMIN — AMIODARONE HYDROCHLORIDE 200 MG: 200 TABLET ORAL at 09:28

## 2024-11-26 RX ADMIN — Medication 10 ML: at 09:29

## 2024-11-26 RX ADMIN — MIDAZOLAM 2 MG: 1 INJECTION INTRAMUSCULAR; INTRAVENOUS at 00:55

## 2024-11-26 RX ADMIN — Medication 10 ML: at 05:39

## 2024-11-26 RX ADMIN — POLYETHYLENE GLYCOL 3350 17 G: 17 POWDER, FOR SOLUTION ORAL at 09:30

## 2024-11-26 RX ADMIN — OXYCODONE HYDROCHLORIDE AND ACETAMINOPHEN 250 MG: 500 TABLET ORAL at 10:52

## 2024-11-26 RX ADMIN — METOPROLOL SUCCINATE 25 MG: 25 TABLET, EXTENDED RELEASE ORAL at 09:28

## 2024-11-26 NOTE — ED PROVIDER NOTES
EMERGENCY DEPARTMENT ENCOUNTER  Room Number:  08/08  PCP: Denise Dickson APRN  Independent Historians: Patient      HPI:  Chief Complaint: had concerns including Constipation.     A complete HPI/ROS/PMH/PSH/SH/FH are unobtainable due to: None    Chronic or social conditions impacting patient care (Social Determinants of Health): None      Context: Rock aMciel Jr. is a 79 y.o. male with a medical history of hypertension, type 2 diabetes, A-fib, hyperlipidemia, history of aortic valve replacement, diastolic heart failure, and CKD presents emergency department today complaining of constipation for the last week.  He says he has not had a bowel movement since 11/15/2024 and he was discharged in the hospital.  He says at that time he was constipated and he gave him a suppository and said he had a large bowel movement when he got home and has not had a bowel movement since then.  He says he has been eating very little and only drinking 1 can of soda per day because he has not had an appetite.  He says his abdomen is very distended and feels uncomfortable in the lower abdomen.  He denies any nausea or vomiting.  He denies diarrhea or urinary symptoms.  He denies fever or chills.  He denies history of intra-abdominal surgeries.  He denies history of bowel obstruction.      Review of prior external notes (non-ED) -and- Review of prior external test results outside of this encounter:   Patient was admitted to the hospital on 11/8/2024 and discharged on 11/15/2024 after being admitted for a cardioversion for A-fib.  He was discharged on amiodarone twice a day.  He was discharged on home oxygen    I reviewed labs from 11/15/2024, creatinine 2.69      PAST MEDICAL HISTORY  Active Ambulatory Problems     Diagnosis Date Noted    Abnormal ECG 05/17/2016    Arteriosclerosis of coronary artery 05/17/2016    Cardiac murmur 05/17/2016    Essential hypertension 05/17/2016    LAFB (left anterior fascicular block) 05/17/2016     Bundle branch block, right 05/17/2016    Neuropathic arthropathy 05/17/2016    Type 2 diabetes mellitus with circulatory disorder, without long-term current use of insulin 05/17/2016    SHASTA (obstructive sleep apnea) 05/17/2016    Gain of weight 05/17/2016    Gout 10/27/2012    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 10/27/2012    Chronic venous insufficiency 10/27/2012    Nonrheumatic aortic valve stenosis 01/30/2017    Carotid stenosis, asymptomatic 01/30/2017    Bradycardia 05/08/2018    Hyperlipidemia 05/08/2018    Bilateral carotid artery disease 05/08/2018    Abnormal cardiovascular stress test 06/26/2018    H/O aortic valve replacement with porcine valve 09/10/2018    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    Amputated toe of right foot 04/11/2019    Fall 05/06/2019    Non-traumatic rhabdomyolysis 05/06/2019    S/P CABG x 2 05/06/2019    CKD (chronic kidney disease) stage 3, GFR 30-59 ml/min 05/06/2019    Rupture of left quadriceps tendon 05/07/2019    Constipation 05/11/2019    Wound of right leg 03/26/2020    Anemia 03/26/2020    Chronic diastolic (congestive) heart failure 03/30/2020    Amputated toe of left foot 02/12/2021    Gas gangrene 10/25/2021    Cellulitis of left lower limb 02/19/2022    Acquired absence of left foot 02/19/2022    Bilateral lower extremity edema 06/04/2023    CKD (chronic kidney disease) stage 3, GFR 30-59 ml/min 09/22/2023    History of pneumonia 03/10/2024    Atelectasis of both lungs 05/19/2024    Abnormal pleural fluid 05/19/2024    Pleural thickening 05/19/2024    Atrial fibrillation, persistent 10/18/2024    Chronic anticoagulation 10/18/2024    Persistent atrial fibrillation 11/07/2024     Resolved Ambulatory Problems     Diagnosis Date Noted    Aortic heart valve narrowing 05/17/2016    Accumulation of fluid in tissues 05/17/2016    Alimentary obesity 05/17/2016    Pleural effusion, right 10/05/2016    Decubitus ulcer of ankle 11/15/2012     Class 1 obesity due to excess calories without serious comorbidity with body mass index (BMI) of 30.0 to 30.9 in adult 10/27/2012    Ulcer of ankle 11/15/2012    Pleural effusion 05/08/2017    Acute kidney injury 03/26/2020    Constipation 03/30/2020    Body mass index (BMI)40.0-44.9, adult 10/20/2020     Past Medical History:   Diagnosis Date    Allergic rhinitis     Bronchitis     Coronary artery disease     Diabetes mellitus 2001    DM (diabetes mellitus)     Encounter for annual health examination 05/06/2014    Hiatal hernia     History of MRSA infection     Hypertension     Murmur     Pneumothorax on right     Sleep apnea     Wellness examination 06/24/2015         PAST SURGICAL HISTORY  Past Surgical History:   Procedure Laterality Date    ARTERY SURGERY Bilateral     carotid    CARDIAC CATHETERIZATION N/A 7/20/2018    Procedure: Left Heart Cath;  Surgeon: Jo Toney MD;  Location: [facility] CATH INVASIVE LOCATION;  Service: Cardiovascular    CARDIAC CATHETERIZATION N/A 7/20/2018    Procedure: Coronary angiography;  Surgeon: Jo Toney MD;  Location: [facility] CATH INVASIVE LOCATION;  Service: Cardiovascular    CAROTID ENDARTERECTOMY Bilateral     COLONOSCOPY  2008    CORONARY ARTERY BYPASS GRAFT WITH AORTIC VALVE REPAIR/REPLACEMENT N/A 7/23/2018    Procedure: INTRAOPERATIVE ALEX, MIDLINE STERNOTOMY, CORONARY ARTERY BYPASS GRAFTING X 3 USING ENDOSCOPICALLY HARVESTED LEFT GREATER SAPHENOUS VEIN,  AORTIC VALVE REPLACEMENT USING 25MM LOPEZ II ULTRA PORCINE VALVE, PRP;  Surgeon: Phong Posey MD;  Location: [facility] OR;  Service: Cardiothoracic    FOOT SURGERY Right 2010    5th digit removal    FOOT SURGERY Left 2011    1 digit removed    INCISION AND DRAINAGE LEG Right 3/28/2020    Procedure: DEBRIDMENT OF RIGHT CALF;  Surgeon: Ross Pineda MD;  Location: [facility] OR;  Service: Vascular;  Laterality: Right;    QUADRICEPS TENDON REPAIR Left 5/14/2019    Procedure: Left  QUADRICEPS TENDON REPAIR;  Surgeon: Camilo Hunter MD;  Location: [facility];  Service: Orthopedics    THORACENTESIS Right 11/21/2016    THORACOSCOPY Right 5/8/2017    Procedure: BRONCHOSCOPY, RIGHT VAT,  TOTAL DECORTICATION RIGHT LUNG, PLEURAL BX, PLACEMENT SUBPLEURAL PAIN CAATHETERS X2;  Surgeon: Donald Orlando III, MD;  Location: [facility] OR;  Service:     TONSILECTOMY, ADENOIDECTOMY, BILATERAL MYRINGOTOMY AND TUBES           FAMILY HISTORY  Family History   Problem Relation Age of Onset    Hypertension Father          SOCIAL HISTORY  Social History     Socioeconomic History    Marital status:     Number of children: 1   Tobacco Use    Smoking status: Never     Passive exposure: Never    Smokeless tobacco: Never   Vaping Use    Vaping status: Never Used   Substance and Sexual Activity    Alcohol use: Yes     Comment: either one glass wine or one glass liquor per  day    Drug use: Never    Sexual activity: Defer         ALLERGIES  Ceclor [cefaclor]      REVIEW OF SYSTEMS  Included in HPI  All systems reviewed and negative except for those discussed in HPI.      PHYSICAL EXAM    I have reviewed the triage vital signs and nursing notes.    ED Triage Vitals   Temp Heart Rate Resp BP SpO2   11/25/24 2003 11/25/24 2003 11/25/24 2003 11/25/24 2009 11/25/24 2003   97.9 °F (36.6 °C) 86 18 162/71 95 %      Temp src Heart Rate Source Patient Position BP Location FiO2 (%)   11/25/24 2003 11/25/24 2003 11/25/24 2009 11/25/24 2009 --   Tympanic Monitor Sitting Right arm        Physical Exam  Constitutional:       Appearance: Normal appearance.   HENT:      Head: Normocephalic and atraumatic.      Mouth/Throat:      Mouth: Mucous membranes are moist.   Eyes:      Extraocular Movements: Extraocular movements intact.      Pupils: Pupils are equal, round, and reactive to light.   Cardiovascular:      Rate and Rhythm: Normal rate and regular rhythm.      Pulses: Normal pulses.      Heart sounds: Normal heart  sounds.   Pulmonary:      Effort: Pulmonary effort is normal. No respiratory distress.      Breath sounds: Normal breath sounds.   Abdominal:      General: There is no distension.      Tenderness: There is abdominal tenderness (Diffuse lower abdominal tenderness to palpation.). There is no guarding or rebound.      Comments: Mildly distended and taut   Musculoskeletal:         General: Normal range of motion.      Cervical back: Normal range of motion and neck supple.   Skin:     General: Skin is warm and dry.      Capillary Refill: Capillary refill takes less than 2 seconds.   Neurological:      General: No focal deficit present.      Mental Status: He is alert and oriented to person, place, and time.   Psychiatric:         Mood and Affect: Mood normal.         Behavior: Behavior normal.         LAB RESULTS  Recent Results (from the past 24 hours)   Comprehensive Metabolic Panel    Collection Time: 11/25/24 10:31 PM    Specimen: Blood   Result Value Ref Range    Glucose 175 (H) 65 - 99 mg/dL    BUN 71 (H) 8 - 23 mg/dL    Creatinine 3.32 (H) 0.76 - 1.27 mg/dL    Sodium 133 (L) 136 - 145 mmol/L    Potassium 3.7 3.5 - 5.2 mmol/L    Chloride 91 (L) 98 - 107 mmol/L    CO2 29.0 22.0 - 29.0 mmol/L    Calcium 9.5 8.6 - 10.5 mg/dL    Total Protein 7.8 6.0 - 8.5 g/dL    Albumin 4.1 3.5 - 5.2 g/dL    ALT (SGPT) 16 1 - 41 U/L    AST (SGOT) 8 1 - 40 U/L    Alkaline Phosphatase 115 39 - 117 U/L    Total Bilirubin 0.5 0.0 - 1.2 mg/dL    Globulin 3.7 gm/dL    A/G Ratio 1.1 g/dL    BUN/Creatinine Ratio 21.4 7.0 - 25.0    Anion Gap 13.0 5.0 - 15.0 mmol/L    eGFR 18.0 (L) >60.0 mL/min/1.73   Lipase    Collection Time: 11/25/24 10:31 PM    Specimen: Blood   Result Value Ref Range    Lipase 43 13 - 60 U/L   CBC Auto Differential    Collection Time: 11/25/24 10:31 PM    Specimen: Blood   Result Value Ref Range    WBC 11.29 (H) 3.40 - 10.80 10*3/mm3    RBC 3.70 (L) 4.14 - 5.80 10*6/mm3    Hemoglobin 10.8 (L) 13.0 - 17.7 g/dL     Hematocrit 33.5 (L) 37.5 - 51.0 %    MCV 90.5 79.0 - 97.0 fL    MCH 29.2 26.6 - 33.0 pg    MCHC 32.2 31.5 - 35.7 g/dL    RDW 14.2 12.3 - 15.4 %    RDW-SD 46.8 37.0 - 54.0 fl    MPV 9.9 6.0 - 12.0 fL    Platelets 304 140 - 450 10*3/mm3    Neutrophil % 86.3 (H) 42.7 - 76.0 %    Lymphocyte % 6.3 (L) 19.6 - 45.3 %    Monocyte % 6.0 5.0 - 12.0 %    Eosinophil % 0.7 0.3 - 6.2 %    Basophil % 0.2 0.0 - 1.5 %    Immature Grans % 0.5 0.0 - 0.5 %    Neutrophils, Absolute 9.74 (H) 1.70 - 7.00 10*3/mm3    Lymphocytes, Absolute 0.71 0.70 - 3.10 10*3/mm3    Monocytes, Absolute 0.68 0.10 - 0.90 10*3/mm3    Eosinophils, Absolute 0.08 0.00 - 0.40 10*3/mm3    Basophils, Absolute 0.02 0.00 - 0.20 10*3/mm3    Immature Grans, Absolute 0.06 (H) 0.00 - 0.05 10*3/mm3    nRBC 0.0 0.0 - 0.2 /100 WBC   Urinalysis With Microscopic If Indicated (No Culture) - Urine, Clean Catch    Collection Time: 11/26/24  2:07 AM    Specimen: Urine, Clean Catch   Result Value Ref Range    Color, UA Yellow Yellow, Straw    Appearance, UA Clear Clear    pH, UA 5.5 5.0 - 8.0    Specific Gravity, UA 1.012 1.005 - 1.030    Glucose, UA Negative Negative    Ketones, UA Negative Negative    Bilirubin, UA Negative Negative    Blood, UA Large (3+) (A) Negative    Protein, UA 30 mg/dL (1+) (A) Negative    Leuk Esterase, UA Negative Negative    Nitrite, UA Negative Negative    Urobilinogen, UA 0.2 E.U./dL 0.2 - 1.0 E.U./dL   Urinalysis, Microscopic Only - Urine, Clean Catch    Collection Time: 11/26/24  2:07 AM    Specimen: Urine, Clean Catch   Result Value Ref Range    RBC, UA 11-20 (A) None Seen, 0-2 /HPF    WBC, UA 0-2 None Seen, 0-2 /HPF    Bacteria, UA None Seen None Seen /HPF    Squamous Epithelial Cells, UA 0-2 None Seen, 0-2 /HPF    Hyaline Casts, UA 7-12 None Seen /LPF    Methodology Automated Microscopy          RADIOLOGY  CT Abdomen Pelvis Without Contrast    Result Date: 11/25/2024  CT OF THE ABDOMEN PELVIS WITHOUT CONTRAST  HISTORY: Abdominal pain and  bloating  COMPARISON: None available.  TECHNIQUE: Axial CT imaging was obtained through the abdomen and pelvis. No IV contrast was administered.  FINDINGS: Images through the lung bases demonstrate some rounded atelectasis within the right lower lobe. There are calcified pleural plaques noted on the left. There is enlargement of the main pulmonary artery, which can be seen in the setting of pulmonary arterial hypertension. No suspicious hepatic lesions are seen. There is a small hiatal hernia, which is distended with fluid. The duodenum, adrenal glands, spleen, and pancreas are normal. Gallbladder is absent. No hydronephrosis is seen on either side. There is a 4 mm nonobstructing stone within the right kidney. There is a simple appearing left renal cyst. No additional follow-up is necessary. There are aortoiliac calcifications. Prostate gland is within normal limits. Urinary bladder is distended, and there is a bladder diverticulum. The patient has marked distention of the colon from the cecum to the distal descending colon. The rectum and sigmoid colon are completely collapsed. Appearance is concerning for obstruction. The transition point appears to be a left inguinal hernia, which contains a loop of distal descending colon no pneumatosis or free air is seen. There is trace free fluid noted within the abdomen. There is no evidence of small bowel obstruction. No acute osseous abnormalities are seen. There is no evidence of appendicitis. There is some relative muscular atrophy of the left psoas musculature and left gluteal musculature.      The patient has a colonic obstruction, with transition point noted within a left inguinal hernia sac. No pneumatosis or free air is seen at this time, although there is a small amount of free fluid which is noted within the abdomen.  Radiation dose reduction techniques were utilized, including automated exposure control and exposure modulation based on body size.   This report  was finalized on 11/25/2024 11:35 PM by Dr. Inocencia Cruz M.D on Workstation: [id number]         MEDICATIONS GIVEN IN ER  Medications   sodium chloride 0.9 % flush 10 mL (has no administration in time range)   sodium chloride 0.9 % flush 10 mL (has no administration in time range)   sodium chloride 0.9 % infusion 40 mL (has no administration in time range)   acetaminophen (TYLENOL) tablet 650 mg (has no administration in time range)     Or   acetaminophen (TYLENOL) 160 MG/5ML oral solution 650 mg (has no administration in time range)     Or   acetaminophen (TYLENOL) suppository 650 mg (has no administration in time range)   ondansetron ODT (ZOFRAN-ODT) disintegrating tablet 4 mg (has no administration in time range)     Or   ondansetron (ZOFRAN) injection 4 mg (has no administration in time range)   calcium carbonate (TUMS) chewable tablet 500 mg (200 mg elemental) (has no administration in time range)   sennosides-docusate (PERICOLACE) 8.6-50 MG per tablet 2 tablet (has no administration in time range)     And   polyethylene glycol (MIRALAX) packet 17 g (has no administration in time range)     And   bisacodyl (DULCOLAX) EC tablet 5 mg (has no administration in time range)     And   bisacodyl (DULCOLAX) suppository 10 mg (has no administration in time range)   dextrose (GLUTOSE) oral gel 15 g (has no administration in time range)   dextrose (D50W) (25 g/50 mL) IV injection 25 g (has no administration in time range)   glucagon (GLUCAGEN) injection 1 mg (has no administration in time range)   insulin lispro (HUMALOG/ADMELOG) injection 2-7 Units (has no administration in time range)   lactated ringers bolus 500 mL (0 mL Intravenous Stopped 11/26/24 0130)   midazolam (VERSED) injection 2 mg (2 mg Intravenous Given 11/26/24 0055)   polyethylene glycol (MIRALAX) packet 17 g (17 g Oral Given 11/26/24 0128)           OUTPATIENT MEDICATION MANAGEMENT:  Current Facility-Administered Medications Ordered in Epic    Medication Dose Route Frequency Provider Last Rate Last Admin    acetaminophen (TYLENOL) tablet 650 mg  650 mg Oral Q4H PRN Sharita Roche APRN        Or    acetaminophen (TYLENOL) 160 MG/5ML oral solution 650 mg  650 mg Oral Q4H PRN Sharita Roche APRN        Or    acetaminophen (TYLENOL) suppository 650 mg  650 mg Rectal Q4H PRN Sharita Roche APRN        sennosides-docusate (PERICOLACE) 8.6-50 MG per tablet 2 tablet  2 tablet Oral BID PRN Sharita Roche APRN        And    polyethylene glycol (MIRALAX) packet 17 g  17 g Oral Daily PRN Sharita Roche APRN        And    bisacodyl (DULCOLAX) EC tablet 5 mg  5 mg Oral Daily PRN Sharita Roche APRN        And    bisacodyl (DULCOLAX) suppository 10 mg  10 mg Rectal Daily PRN Sharita Roche APRN        calcium carbonate (TUMS) chewable tablet 500 mg (200 mg elemental)  2 tablet Oral BID PRN Sharita Roche APRN        dextrose (D50W) (25 g/50 mL) IV injection 25 g  25 g Intravenous Q15 Min PRN Sharita Roche APRN        dextrose (GLUTOSE) oral gel 15 g  15 g Oral Q15 Min PRN Sharita Roche APRN        glucagon (GLUCAGEN) injection 1 mg  1 mg Intramuscular Q15 Min PRN Sharita Roche APRN        insulin lispro (HUMALOG/ADMELOG) injection 2-7 Units  2-7 Units Subcutaneous 4x Daily AC & at Bedtime Sharita Roche APRN        ondansetron ODT (ZOFRAN-ODT) disintegrating tablet 4 mg  4 mg Oral Q6H PRN Sharita Roche APRN        Or    ondansetron (ZOFRAN) injection 4 mg  4 mg Intravenous Q6H PRN Sharita Roche APRN        sodium chloride 0.9 % flush 10 mL  10 mL Intravenous Q12H Sharita Roche APRN        sodium chloride 0.9 % flush 10 mL  10 mL Intravenous PRN Sharita Roche APRN        sodium chloride 0.9 % infusion 40 mL  40 mL Intravenous PRN Sharita Roche APRN         Current Outpatient Medications Ordered in Epic   Medication Sig Dispense Refill     amiodarone (PACERONE) 200 MG tablet Take 1 tablet by mouth Daily. 60 tablet 1    apixaban (ELIQUIS) 2.5 MG tablet tablet Take 1 tablet by mouth 2 (Two) Times a Day. Indications: Atrial Fibrillation 60 tablet 5    atorvastatin (LIPITOR) 20 MG tablet Take 1 tablet by mouth Every Night. NEED TO SEE PROVIDER FOR FUTURE REFILLS. 90 tablet 1    bumetanide (BUMEX) 1 MG tablet Take 1 tablet by mouth 2 (Two) Times a Day. 60 tablet 5    Cholecalciferol 25 MCG (1000 UT) tablet Take 1 tablet by mouth Daily.      clopidogrel (PLAVIX) 75 MG tablet Take 1 tablet by mouth Daily. 90 tablet 1    glipizide (GLUCOTROL) 5 MG tablet TAKE 1 TABLET BY MOUTH 2 TIMES A DAY BEFORE MEALS. 180 tablet 1    glucose blood (Accu-Chek Keshia Plus) test strip USE TO TEST BLOOD SUGAR    TWICE DAILY FOR DIABETES 100 each 8    hydrALAZINE (APRESOLINE) 25 MG tablet Take 1 tablet by mouth 3 (Three) Times a Day. 90 tablet 5    linagliptin (Tradjenta) 5 MG tablet tablet Take 1 tablet by mouth Daily. 90 tablet 1    metoprolol succinate XL (TOPROL-XL) 25 MG 24 hr tablet Take 1 tablet by mouth Daily. 30 tablet 5    nitroglycerin (NITROSTAT) 0.4 MG SL tablet Place 1 tablet under the tongue Every 5 (Five) Minutes As Needed for Chest Pain (Only if SBP Greater Than 100). Take no more than 3 doses in 15 minutes. 25 tablet 0    terazosin (HYTRIN) 5 MG capsule TAKE 1 CAPSULE BY MOUTH EVERY DAY AT BEDTIME FOR 90 DAYS      vitamin C (ASCORBIC ACID) 250 MG tablet Take 1 tablet by mouth Daily.      Zinc 50 MG tablet              PROGRESS, DATA ANALYSIS, CONSULTS, AND MEDICAL DECISION MAKING  ORDERS PLACED DURING THIS VISIT:  Orders Placed This Encounter   Procedures    CT Abdomen Pelvis Without Contrast    Comprehensive Metabolic Panel    Lipase    Urinalysis With Microscopic If Indicated (No Culture) - Urine, Clean Catch    CBC Auto Differential    Basic Metabolic Panel    CBC (No Diff)    Urinalysis, Microscopic Only - Urine, Clean Catch    NPO Diet NPO Type: Sips with  Meds    Soap suds enema    Vital Signs    Intake & Output    Weigh Patient    Oral Care    Saline Lock & Maintain IV Access    Place Sequential Compression Device    Maintain Sequential Compression Device    Code Status and Medical Interventions: CPR (Attempt to Resuscitate); Full Support    Inpatient General Surgery Consult    [facility] (on-call MD unless specified) Details    POC Glucose 4x Daily Before Meals & at Bedtime    Insert Peripheral IV    Inpatient Admission    CBC & Differential       All labs have been independently interpreted by me.  All radiology studies have been reviewed by me. All EKG's have been independently viewed and interpreted by me.  Discussion below represents my analysis of pertinent findings related to patient's condition, differential diagnosis, treatment plan and final disposition.    Differential diagnosis includes but is not limited to:   My differential diagnosis for abdominal pain for a male includes but is not limited to:  Gastritis, gastroenteritis, peptic ulcer disease, GERD, esophageal perforation, acute appendicitis, mesenteric adenitis, Meckel’s diverticulum, epiploic appendagitis, diverticulitis, colon cancer, ulcerative colitis, Crohn’s disease, intussusception, small bowel obstruction, adhesions, ischemic bowel, perforated viscus, ileus, obstipation, biliary colic, cholecystitis, cholelithiasis, hepatitis, pancreatitis, common bile duct obstruction, cholangitis, bile leak, splenic trauma, splenic rupture, splenic infarction, splenic abscess, abdominal abscess, ascites, spontaneous bacterial peritonitis, hernia, UTI, cystitis, prostatitis, ureterolithiasis, urinary obstruction, testicular torsion, AAA, myocardial infarction, pneumonia, cancer, porphyria, DKA, medications, sickle cell, viral syndrome, zoster      ED Course:  ED Course as of 11/26/24 0336   Mon Nov 25, 2024   2334 Creatinine(!): 3.32  2.69, 10 days ago [CC]   0527 My independent interpretation of the CT of the  abdomen pelvis is incarcerated inguinal hernia with what appears to be bowel obstruction. [CC]   3462 Dr. Brenner was able to palpate an incarcerated hernia on this patient at the bedside. [CC]   Tue Nov 26, 2024   0022 Spoke with Dr. Contreras with general surgery who will come see the patient [CC]   0051 General surgery at bedside [CC]   0108 Dr. Contreras was able to reduce the patient's hernia at bedside and patient tolerated well.  He would like to move forward with aggressive bowel regimen including MiraLAX and enemas.  If bowel obstruction does not resolve patient may require surgical intervention but does not require emergent surgical intervention tonight. [CC]   0138 Spoke with DERRICK Sheriff with [facility].  Reviewed history, exam, results, treatments.  She agrees admit the patient to Dr. Flores.      [CC]      ED Course User Index  [CC] Nellie Saini PA-C           AS OF 03:36 EST VITALS:    BP - 137/65  HR - 81  TEMP - 97.9 °F (36.6 °C) (Tympanic)  O2 SATS - 95%        MDM:  Patient is an 79-year-old male presents emergency department today with constipation x 1 week.  On arrival here in the emergency department vitals are reassuring, he is afebrile.  On my exam the patient is diffusely tender across the lower abdomen.  Patient was evaluated with labs which revealed an acute on chronic kidney injury.  She does report decreased p.o. intake over the last week.  CT of the abdomen pelvis revealed an incarcerated inguinal hernia causing a bowel obstruction.  He did have a palpable incarcerated hernia on physical exam we were unable to reduce here in the emergency department.  General surgery was consulted and came down and evaluated the patient at the bedside and was able to reduce the hernia at bedside.  They encouraged bowel regimen including enemas and MiraLAX.  Patient will require admission to the hospital for treatment of KILLIAN and constipation.  He is stable at time of admission.      COMPLEXITY OF CARE  The  patient requires admission.        DIAGNOSIS  Final diagnoses:   Incarcerated inguinal hernia   SBO (small bowel obstruction)   KILLIAN (acute kidney injury)         DISPOSITION  ED Disposition       ED Disposition   Decision to Admit    Condition   --    Comment   Level of Care: Telemetry [5]   Diagnosis: Incarcerated inguinal hernia [[id number]]   Admitting Physician: DEVYN CERDA [[id number]]   Attending Physician: DEVYN CERDA [[id number]]   Certification: I Certify That Inpatient Hospital Services Are Medically Necessary For Greater Than 2 Midnights                      Please note that portions of this document were completed with a voice recognition program.    Note Disclaimer: At [facility], we believe that sharing information builds trust and better relationships. You are receiving this note because you recently visited [facility]. It is possible you will see health information before a provider has talked with you about it. This kind of information can be easy to misunderstand. To help you fully understand what it means for your health, we urge you to discuss this note with your provider.     Nellie Saini PA-C  11/26/24 0346

## 2024-11-26 NOTE — NURSING NOTE
Reason for Visit: CWCN: Consult received for evaluation of left Gluteal site. Patient alert, able to walk with a walker, medical history of hypertension, type 2 diabetes, A-fib, hyperlipidemia, history of aortic valve replacement, diastolic heart failure, and CKD presents emergency department today complaining of constipation for the last week. Upon assessment, redness was observed, bleachable with partial-thickness skin loss in both gluteal areas, more accentuated on the left side, on soft tissue, possibly related to moisture and/or friction.  Bilateral Heesl are with intact skin and normal coloration.   Treatment Plan/Recommendations: Calmoseptine ordered to Coccyx/Buttocks area.  Protective padding should be used to facilitate cushioning to bilateral heels, they must remain off-loaded at all the time.  Wound Team Follow up Plan: WOC nurse will follow up according to patient need

## 2024-11-26 NOTE — CONSULTS
INITIAL CONSULT NOTE      Patient Name: Rock Maciel Jr.  : 1945  MRN: [id number]  Primary Care Physician: Denise Dickson APRN  Date of admission: 2024    Patient Care Team:  Denise Dickson APRN as PCP - General (Nurse Practitioner)  Azam Banegas Jr., MD as Consulting Physician (Cardiology)  Jamil Stevens MD as Consulting Physician (Nephrology)        Reason for Consult:       ***  Subjective   History of Present Illness:   Chief Complaint:   Chief Complaint   Patient presents with    Constipation     HISTORY:  Rock Maciel Jr. is a 79 y.o. male with past medical history of ***. who presents with ***          Review of systems:  ***  Constitutional: No fever, no chills, no lethargy, no weakness.  HEENT:  No headache, otalgia, itchy eyes, nasal discharge or sore throat.  Cardiac:  No chest pain, dyspnea, orthopnea or PND.  Chest:              No cough, phlegm or wheezing.  Abdomen:  No abdominal pain, nausea or vomiting.  Neuro:  No focal weakness, abnormal movements or seizure-like activity.  :   No hematuria, no pyuria, no dysuria, no flank pain.  ROS was otherwise negative except as mentioned in the [place].       Personal History:     Past Medical History:   Past Medical History:   Diagnosis Date    Allergic rhinitis     Bronchitis     Coronary artery disease     Diabetes mellitus     DM (diabetes mellitus)     Encounter for annual health examination 2014    Annual Health Assessment    Gout     Hiatal hernia     History of MRSA infection     RIGHT FOOT     Hyperlipidemia     Hypertension     Murmur     Pneumothorax on right     Sleep apnea     pt wears CPAP at night    Wellness examination 2015    Annual Wellness Visit       Surgical History:      Past Surgical History:   Procedure Laterality Date    ARTERY SURGERY Bilateral     carotid    CARDIAC CATHETERIZATION N/A 2018    Procedure: Left Heart Cath;  Surgeon: Jo Toney MD;  Location:  [facility] CATH INVASIVE LOCATION;  Service: Cardiovascular    CARDIAC CATHETERIZATION N/A 7/20/2018    Procedure: Coronary angiography;  Surgeon: Jo Toney MD;  Location: [facility] CATH INVASIVE LOCATION;  Service: Cardiovascular    CAROTID ENDARTERECTOMY Bilateral     COLONOSCOPY  2008    CORONARY ARTERY BYPASS GRAFT WITH AORTIC VALVE REPAIR/REPLACEMENT N/A 7/23/2018    Procedure: INTRAOPERATIVE ALEX, MIDLINE STERNOTOMY, CORONARY ARTERY BYPASS GRAFTING X 3 USING ENDOSCOPICALLY HARVESTED LEFT GREATER SAPHENOUS VEIN,  AORTIC VALVE REPLACEMENT USING 25MM LOPEZ II ULTRA PORCINE VALVE, PRP;  Surgeon: Phong Posey MD;  Location: [facility] OR;  Service: Cardiothoracic    FOOT SURGERY Right 2010    5th digit removal    FOOT SURGERY Left 2011    1 digit removed    INCISION AND DRAINAGE LEG Right 3/28/2020    Procedure: DEBRIDMENT OF RIGHT CALF;  Surgeon: Ross Pineda MD;  Location: [facility] OR;  Service: Vascular;  Laterality: Right;    QUADRICEPS TENDON REPAIR Left 5/14/2019    Procedure: Left QUADRICEPS TENDON REPAIR;  Surgeon: Camilo Hunter MD;  Location: [facility] OR;  Service: Orthopedics    THORACENTESIS Right 11/21/2016    THORACOSCOPY Right 5/8/2017    Procedure: BRONCHOSCOPY, RIGHT VAT,  TOTAL DECORTICATION RIGHT LUNG, PLEURAL BX, PLACEMENT SUBPLEURAL PAIN CAATHETERS X2;  Surgeon: Donald Orlando III, MD;  Location: [facility];  Service:     TONSILECTOMY, ADENOIDECTOMY, BILATERAL MYRINGOTOMY AND TUBES         Family History: family history includes Hypertension in his father. Otherwise pertinent FHx was reviewed and unremarkable.     Social History:  reports that he has never smoked. He has never been exposed to tobacco smoke. He has never used smokeless tobacco. He reports current alcohol use. He reports that he does not use drugs.    Medications:  Prior to Admission medications    Medication Sig Start Date End Date Taking? Authorizing Provider   amiodarone (PACERONE) 200 MG  tablet Take 1 tablet by mouth Daily. 11/21/24  Yes Jo Toney MD   apixaban (ELIQUIS) 2.5 MG tablet tablet Take 1 tablet by mouth 2 (Two) Times a Day. Indications: Atrial Fibrillation 11/15/24  Yes Apurva Smith APRN   bumetanide (BUMEX) 1 MG tablet Take 1 tablet by mouth 2 (Two) Times a Day. 11/15/24  Yes Apurva Smith APRN   Cholecalciferol 25 MCG (1000 UT) tablet Take 1 tablet by mouth Daily.   Yes ProviderShivam MD   glipizide (GLUCOTROL) 5 MG tablet TAKE 1 TABLET BY MOUTH 2 TIMES A DAY BEFORE MEALS. 10/29/24  Yes Denise Dickson APRN   hydrALAZINE (APRESOLINE) 25 MG tablet Take 1 tablet by mouth 3 (Three) Times a Day. 11/16/24  Yes Apurva Smith APRN   linagliptin (Tradjenta) 5 MG tablet tablet Take 1 tablet by mouth Daily. 11/19/24  Yes Denise Dickson APRN   metoprolol succinate XL (TOPROL-XL) 25 MG 24 hr tablet Take 1 tablet by mouth Daily. 11/15/24  Yes Apurva Smith APRN   vitamin C (ASCORBIC ACID) 250 MG tablet Take 1 tablet by mouth Daily.   Yes ProviderShivam MD   Zinc 50 MG tablet  2/19/22  Yes ProviderShivam MD   atorvastatin (LIPITOR) 20 MG tablet Take 1 tablet by mouth Every Night. NEED TO SEE PROVIDER FOR FUTURE REFILLS. 10/10/24   Denise Dickson APRN   clopidogrel (PLAVIX) 75 MG tablet Take 1 tablet by mouth Daily. 10/16/24   Denise Dickson APRN   glucose blood (Accu-Chek Keshia Plus) test strip USE TO TEST BLOOD SUGAR    TWICE DAILY FOR DIABETES 7/29/24   Denise Dickson APRN   nitroglycerin (NITROSTAT) 0.4 MG SL tablet Place 1 tablet under the tongue Every 5 (Five) Minutes As Needed for Chest Pain (Only if SBP Greater Than 100). Take no more than 3 doses in 15 minutes. 11/15/24   Apurva Smith APRN   terazosin (HYTRIN) 5 MG capsule TAKE 1 CAPSULE BY MOUTH EVERY DAY AT BEDTIME FOR 90 DAYS 8/1/24   Provider, MD Shivam     Scheduled Meds:amiodarone, 200 mg, Oral, Daily  vitamin C, 250 mg, Oral,  Daily  atorvastatin, 20 mg, Oral, Nightly  cholecalciferol, 1,000 Units, Oral, Daily  insulin lispro, 2-7 Units, Subcutaneous, 4x Daily AC & at Bedtime  linagliptin, 5 mg, Oral, Daily  Menthol-Zinc Oxide, 1 Application, Topical, Q12H  metoprolol succinate XL, 25 mg, Oral, Daily  sodium chloride, 10 mL, Intravenous, Q12H  terazosin, 1 mg, Oral, Nightly      Continuous Infusions:sodium chloride, 100 mL/hr, Last Rate: 50 mL/hr (11/26/24 0732)      PRN Meds:  acetaminophen **OR** acetaminophen **OR** acetaminophen    senna-docusate sodium **AND** polyethylene glycol **AND** bisacodyl **AND** bisacodyl    calcium carbonate    dextrose    dextrose    glucagon (human recombinant)    labetalol    ondansetron ODT **OR** ondansetron    sodium chloride    sodium chloride  Allergies:    Allergies   Allergen Reactions    Ceclor [Cefaclor] Rash     Rash in his 50s; he tolerated piperacillin-tazobactam in March 2020       Objective   Exam:     Vital Signs  Temp:  [97.4 °F (36.3 °C)-98.4 °F (36.9 °C)] 97.5 °F (36.4 °C)  Heart Rate:  [69-86] 69  Resp:  [16-18] 16  BP: (125-162)/(43-71) 125/43  SpO2:  [93 %-100 %] 99 %  on   ;   Device (Oxygen Therapy): room air  Body mass index is 34.57 kg/m².  EXAM  General:  ***  male in no acute distress.    Head:      Normocephalic and atraumatic.    Eyes:      PERRL/EOM intact, conjunctivae and sclerae clear without nystagmus.    Neck:      No masses, thyromegaly,  trachea central   Lungs:    Clear bilaterally to auscultation.    Heart:      Regular rate and rhythm, no murmur no gallop  Abd:        Soft, nontender, not distended, bowel sounds positive, no shifting dullness.  Msk:        No deformity or scoliosis noted of thoracic or lumbar spine.    Pulses:   Pulses normal in all 4 extremities.    Extremities:        No cyanosis or clubbing--*** edema.    Neuro:    No focal deficits.   alert oriented x3  Skin:       Intact without lesions or rashes.    Psych:    Alert and cooperative; normal  mood and affect; normal attention span       Results Review:  I have personally reviewed most recent Data :  [facility]10[facility]  CBC    Results from last 7 days   Lab Units 11/26/24  0622 11/25/24  2231   WBC 10*3/mm3 9.90 11.29*   HEMOGLOBIN g/dL 9.4* 10.8*   PLATELETS 10*3/mm3 277 304     CMP   Results from last 7 days   Lab Units 11/26/24  0622 11/25/24  2231   SODIUM mmol/L 137 133*   POTASSIUM mmol/L 3.5 3.7   CHLORIDE mmol/L 95* 91*   CO2 mmol/L 30.5* 29.0   BUN mg/dL 66* 71*   CREATININE mg/dL 2.85* 3.32*   GLUCOSE mg/dL 111* 175*   ALBUMIN g/dL  --  4.1   BILIRUBIN mg/dL  --  0.5   ALK PHOS U/L  --  115   AST (SGOT) U/L  --  8   ALT (SGPT) U/L  --  16   LIPASE U/L  --  43     ABG      CT Abdomen Pelvis Without Contrast    Result Date: 11/25/2024  The patient has a colonic obstruction, with transition point noted within a left inguinal hernia sac. No pneumatosis or free air is seen at this time, although there is a small amount of free fluid which is noted within the abdomen.  Radiation dose reduction techniques were utilized, including automated exposure control and exposure modulation based on body size.   This report was finalized on 11/25/2024 11:35 PM by Dr. Inocencia Cruz M.D on Workstation: [id number]       Results for orders placed during the hospital encounter of 10/03/24    Adult Transthoracic Echo Complete W/ Cont if Necessary Per Protocol    Interpretation Summary    Left ventricular ejection fraction appears to be 61 - 65%.    Left ventricular diastolic function was indeterminate.    The left atrial cavity is moderately dilated.    Left atrial volume is moderately increased.    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    Estimated right ventricular systolic pressure from tricuspid regurgitation is moderately elevated (45-55 mmHg).        Assessment & Plan   Assessment and Plan:         Incarcerated inguinal hernia    Essential hypertension    Type 2 diabetes mellitus with  circulatory disorder, without long-term current use of insulin    Hyperlipidemia    H/O aortic valve replacement with porcine valve    S/P CABG x 2    CKD (chronic kidney disease) stage 3, GFR 30-59 ml/min    KILLIAN (acute kidney injury)    Atrial fibrillation, persistent    ASSESSMENT:  KILLIAN some increasing renal function may be because of hemodynamics  CKD III  Volume overload  Symptomatic Afib  CAD s/p CABG  Anemia  AVR  HTN  HLD  DM        Echo 10/3/24 EF 61-65% indeterminate LV diastolic function. KILLIAN some increasing renal function may be because of hemodynamics  CKD III  Volume overload  Symptomatic Afib  CAD s/p CABG  Anemia  AVR  HTN  HLD  DM        Echo 10/3/24 EF 61-65% indeterminate LV diastolic function.           PLAN :     ***      [facility]  11/26/2024  12:19 EST

## 2024-11-26 NOTE — PLAN OF CARE
"Goal Outcome Evaluation:  Plan of Care Reviewed With: patient           Outcome Evaluation: Pt is an 80 yo male who presents from home with constipation, SBO with incarcerated inguinal hernia. Prior to admission, pt was living at home alone and independent with mobility using rwx. Pt demos generalized weakness but states he \"feels better than he did last time he was here\". Pt up in chair upon PT arrival and performed STS transfer with CGA and rwx. Pt ambulated 150' with CGA and rwx. Pt will continue to benefit from PT to address impairments and increase independence with mobility. Rec HH PT at [facility].    Anticipated Discharge Disposition (PT): home with home health                        "

## 2024-11-26 NOTE — OUTREACH NOTE
Medical Week 2 Survey      Flowsheet Row Responses   [facility] patient discharged from? [place]   Does the patient have one of the following disease processes/diagnoses(primary or secondary)? Other   Week 2 attempt successful? No   Unsuccessful attempts Attempt 2   Revoke Readmitted            Patria MENDOZA - Registered Nurse

## 2024-11-26 NOTE — CONSULTS
General Surgery History and Physical      History of Present Illness:    Rock Maciel Jr. is a 79 y.o. gentleman with coronary artery disease status post CABG x 2 in 2019, aortic valve replacement in 2019, atrial fibrillation on Eliquis and Plavix status post electrical cardioversion on 11/8, bilateral carotid endarterectomy, CKD, and morbid obesity presenting with large bowel obstruction due to incarcerated left inguinal hernia. He has not had a bowel movement in over a week and presented with abdominal discomfort and bloating. He denies nausea and vomiting.  He was not aware that he had an inguinal hernia. CT scan shows a large bowel obstruction due to incarcerated left inguinal hernia containing decompressed sigmoid colon, without signs of strangulation or bowel ischemia, and no upstream small bowel dilation. His hernia has was present on CT scans dating back to 2020. His last colonoscopy was in 2008.  He has not had any prior abdominal surgery or hernia repairs.  He is not a smoker. He is afebrile and hemodynamically normal. Labs notable for wbc of 11.29, and suggestive of dehydration with acute kidney injury.     Plan:   We discussed if his hernia could not be reduced he would need emergent hernia repair with possible bowel resection and ostomy tonight. I was able to reduce his hernia with some difficulty after sedation with versed. We discussed I generally recommend hernia repair on a semi-urgent basis in this situation, although he is at high risk for complications from surgery due to his comorbidities and medical optimization would be necessary beforehand. The priority at this point is ensuring his obstruction is resolved. I recommend bowel rest for now with consideration of gastrografin enema should he not have prompt return of bowel function to rule out reduction en albaro or stricture from chronic incarceration. Hold eliquis and plavix for now.              Past Medical History:   Past Medical History:    Diagnosis Date    Allergic rhinitis     Bronchitis     Coronary artery disease     Diabetes mellitus 2001    DM (diabetes mellitus)     Encounter for annual health examination 05/06/2014    Gout     Hiatal hernia     History of MRSA infection     Hyperlipidemia     Hypertension     Murmur     Pneumothorax on right     Sleep apnea     Wellness examination 06/24/2015          Past Surgical History:    Past Surgical History:   Procedure Laterality Date    ARTERY SURGERY Bilateral     carotid    CARDIAC CATHETERIZATION N/A 7/20/2018    Procedure: Left Heart Cath;  Surgeon: Jo Toney MD;  [facility];  Service: Cardiovascular    CARDIAC CATHETERIZATION N/A 7/20/2018    Procedure: Coronary angiography;  Surgeon: Jo Toney MD;  [facility];  Service: Cardiovascular    CAROTID ENDARTERECTOMY Bilateral     COLONOSCOPY  2008    CORONARY ARTERY BYPASS GRAFT WITH AORTIC VALVE REPAIR/REPLACEMENT N/A 7/23/2018    Procedure: INTRAOPERATIVE ALEX, MIDLINE STERNOTOMY, CORONARY ARTERY BYPASS GRAFTING X 3 USING ENDOSCOPICALLY HARVESTED LEFT GREATER SAPHENOUS VEIN,  AORTIC VALVE REPLACEMENT USING 25MM LOPEZ II ULTRA PORCINE VALVE, PRP;  Surgeon: Phong Posey MD;  Location: [facility] OR;  Service: Cardiothoracic    FOOT SURGERY Right 2010    5th digit removal    FOOT SURGERY Left 2011    1 digit removed    INCISION AND DRAINAGE LEG Right 3/28/2020    Procedure: DEBRIDMENT OF RIGHT CALF;  Surgeon: Ross Pineda MD;  Location: [facility] OR;  Service: Vascular;  Laterality: Right;    QUADRICEPS TENDON REPAIR Left 5/14/2019    Procedure: Left QUADRICEPS TENDON REPAIR;  Surgeon: Camilo Hunter MD;  Location: [facility] OR;  Service: Orthopedics    THORACENTESIS Right 11/21/2016    THORACOSCOPY Right 5/8/2017    Procedure: BRONCHOSCOPY, RIGHT VAT,  TOTAL DECORTICATION RIGHT LUNG, PLEURAL BX, PLACEMENT SUBPLEURAL PAIN CAATHETERS X2;   Surgeon: Donald Orlando III, MD;  Location: [facility] OR;  Service:     TONSILECTOMY, ADENOIDECTOMY, BILATERAL MYRINGOTOMY AND TUBES          Family History:    Family History   Problem Relation Age of Onset    Hypertension Father         Social History:    Tobacco: Denies  Alcohol: minimal      Medications:   Reviewed in the chart      Laboratory Results  WBC 11.29, hemoglobin 10.8, platelets 304  Sodium 133, chloride 91, HCO3 29, BUN 71, creatinine 3.32    Radiology  I have personally reviewed CT scan of the abdomen and pelvis demonstrating a large bowel obstruction due to incarcerated left inguinal hernia containing decompressed sigmoid colon, without signs of strangulation or bowel ischemia, and no upstream small bowel dilation.     Endoscopy  Last colonoscopy 2008      Physical Exam:   Vitals:    11/26/24 0102   BP: 127/52   Pulse: 84   Resp:    Temp:    SpO2: 93%      General: not sick, awake and alert  Cardiac: normal rate, no JVD, + peripheral edema  Respiratory: nonlabored breathing on room air  Abdomen: soft, minimally tender, moderately distended, no guarding,   Moderate size left inguinal hernia with mild tenderness on deep palpation and no overlying skin changes, reduced with difficulty      Body mass index is 34.57 kg/m².    BMI is >= 30 and <35. (Class 1 Obesity). The following options were offered after discussion;: exercise counseling/recommendations and nutrition counseling/recommendations               Jamel Contreras M.D.  General Surgery  [facility]    [address] [place] [address], [address]  [place], [place], [address]  P: [phone]  F: [phone]

## 2024-11-26 NOTE — PLAN OF CARE
Goal Outcome Evaluation:              Outcome Evaluation: Pt A&Ox4 on RA. Pt sitting up in chair. Chair alarm on and call light within reach.

## 2024-11-26 NOTE — THERAPY EVALUATION
Patient Name: Rock Maciel Jr.  : 1945    MRN: [id number]                              Today's Date: 2024       Admit Date: 2024    Visit Dx:     ICD-10-CM ICD-9-CM   1. Incarcerated inguinal hernia  K40.30 550.10   2. SBO (small bowel obstruction)  K56.609 560.9   3. KILLIAN (acute kidney injury)  N17.9 584.9     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    Chronic venous insufficiency    Nonrheumatic aortic valve stenosis    Carotid stenosis, asymptomatic    Bradycardia    Hyperlipidemia    Bilateral carotid artery disease    Abnormal cardiovascular stress test    H/O aortic valve replacement with porcine valve    Charcot-Davida-Tooth disease-like deformity of foot    Amputated toe of right foot    Fall    Non-traumatic rhabdomyolysis    S/P CABG x 2    CKD (chronic kidney disease) stage 3, GFR 30-59 ml/min    Rupture of left quadriceps tendon    Constipation    KILLIAN (acute kidney injury)    Wound of right leg    Anemia    Chronic diastolic (congestive) heart failure    Amputated toe of left foot    Gas gangrene    Cellulitis of left lower limb    Acquired absence of left foot    Bilateral lower extremity edema    CKD (chronic kidney disease) stage 3, GFR 30-59 ml/min    History of pneumonia    Atelectasis of both lungs    Abnormal pleural fluid    Pleural thickening    Atrial fibrillation, persistent    Chronic anticoagulation    Persistent atrial fibrillation    Incarcerated inguinal hernia     Past Medical History:   Diagnosis Date    Allergic rhinitis     Bronchitis     Coronary artery disease      Diabetes mellitus 2001    DM (diabetes mellitus)     Encounter for annual health examination 05/06/2014    Annual Health Assessment    Gout     Hiatal hernia     History of MRSA infection     RIGHT FOOT 2010    Hyperlipidemia     Hypertension     Murmur     Pneumothorax on right     Sleep apnea     pt wears CPAP at night    Wellness examination 06/24/2015    Annual Wellness Visit     Past Surgical History:   Procedure Laterality Date    ARTERY SURGERY Bilateral     carotid    CARDIAC CATHETERIZATION N/A 7/20/2018    Procedure: Left Heart Cath;  Surgeon: Jo Toney MD;  [facility];  Service: Cardiovascular    CARDIAC CATHETERIZATION N/A 7/20/2018    Procedure: Coronary angiography;  Surgeon: Jo Toney MD;  Location:  [facility] CATH INVASIVE LOCATION;  Service: Cardiovascular    CAROTID ENDARTERECTOMY Bilateral     COLONOSCOPY  2008    CORONARY ARTERY BYPASS GRAFT WITH AORTIC VALVE REPAIR/REPLACEMENT N/A 7/23/2018    Procedure: INTRAOPERATIVE ALEX, MIDLINE STERNOTOMY, CORONARY ARTERY BYPASS GRAFTING X 3 USING ENDOSCOPICALLY HARVESTED LEFT GREATER SAPHENOUS VEIN,  AORTIC VALVE REPLACEMENT USING 25MM LOPEZ II ULTRA PORCINE VALVE, PRP;  Surgeon: Phong Posey MD;  Location: [facility] OR;  Service: Cardiothoracic    FOOT SURGERY Right 2010    5th digit removal    FOOT SURGERY Left 2011    1 digit removed    INCISION AND DRAINAGE LEG Right 3/28/2020    Procedure: DEBRIDMENT OF RIGHT CALF;  Surgeon: Ross Pineda MD;  Location: [facility] OR;  Service: Vascular;  Laterality: Right;    QUADRICEPS TENDON REPAIR Left 5/14/2019    Procedure: Left QUADRICEPS TENDON REPAIR;  Surgeon: Camilo Hunter MD;  Location: [facility] OR;  Service: Orthopedics    THORACENTESIS Right 11/21/2016    THORACOSCOPY Right 5/8/2017    Procedure: BRONCHOSCOPY, RIGHT VAT,  TOTAL DECORTICATION RIGHT LUNG, PLEURAL BX, PLACEMENT SUBPLEURAL PAIN CAATHETERS X2;  Surgeon: Donald RAGLAND  Johanny ROBERT MD;  Location: [facility] MAIN OR;  Service:     TONSILECTOMY, ADENOIDECTOMY, BILATERAL MYRINGOTOMY AND TUBES        General Information       Row Name 11/26/24 0825          OT Time and Intention    Subjective Information no complaints  -BC     Document Type evaluation  -BC     Mode of Treatment individual therapy;occupational therapy  -BC     Patient Effort good  -BC     Symptoms Noted During/After Treatment none  -BC       Row Name 11/26/24 0825          General Information    Patient Profile Reviewed yes  -BC     Prior Level of Function independent:;all household mobility;ADL's;transfer;cleaning;driving  has a  come in once a month  -BC     Existing Precautions/Restrictions fall  -BC     Barriers to Rehab none identified  -BC       Row Name 11/26/24 0825          Living Environment    People in Home alone  -BC       Row Name 11/26/24 0825          Home Main Entrance    Number of Stairs, Main Entrance two  -BC       Row Name 11/26/24 0825          Stairs Within Home, Primary    Stairs, Within Home, Primary stair lift inside the home  -BC       Row Name 11/26/24 0825          Cognition    Orientation Status (Cognition) oriented x 4  -BC       Row Name 11/26/24 0825          Safety Issues/Impairments Affecting Functional Mobility    Impairments Affecting Function (Mobility) balance;endurance/activity tolerance;strength  -BC               User Key  (r) = Recorded By, (t) = Taken By, (c) = Cosigned By      Initials Name Provider Type    BC Kandy Abdalla OT Occupational Therapist                     Mobility/ADL's       Row Name 11/26/24 0827          Bed Mobility    Bed Mobility supine-sit  -BC     Supine-Sit [place] (Bed Mobility) contact guard  -BC     Assistive Device (Bed Mobility) bed rails;head of bed elevated  -BC     Comment, (Bed Mobility) use of grab bar and increased time w/ mvmts  -BC       Row Name 11/26/24 0827          Transfers    Transfers sit-stand transfer;stand-sit  transfer;toilet transfer;bed-chair transfer  -BC       Row Name 11/26/24 0827          Bed-Chair Transfer    Bed-Chair [place] (Transfers) contact guard  -BC     Assistive Device (Bed-Chair Transfers) walker, front-wheeled  -BC       Row Name 11/26/24 0827          Sit-Stand Transfer    Sit-Stand [place] (Transfers) contact guard  -BC     Assistive Device (Sit-Stand Transfers) walker, front-wheeled  -BC     Comment, (Sit-Stand Transfer) bed slighty elevated  -BC       Row Name 11/26/24 0827          Stand-Sit Transfer    Stand-Sit [place] (Transfers) minimum assist (75% patient effort);contact guard  -BC     Comment, (Stand-Sit Transfer) cues for eccentric lowering with uncontrolled descent  -BC       Row Name 11/26/24 0827          Toilet Transfer    Type (Toilet Transfer) sit-stand;stand-sit  -BC     [place] Level (Toilet Transfer) minimum assist (75% patient effort)  -BC     Assistive Device (Toilet Transfer) commode, bedside without drop arms  -BC     Comment, (Toilet Transfer) onto/off BSC in BR min A  -BC       Row Name 11/26/24 0827          Functional Mobility    Functional Mobility- Device walker, front-wheeled  -BC     Functional Mobility- Comment short distances w close SBA w/ FWW in room. Pt reports he did not qualify for a bariatric walker at home but noted walker is narrow given his width, SBA  -BC     Patient was able to Ambulate yes  -BC       Row Name 11/26/24 0827          Activities of Daily Living    BADL Assessment/Intervention upper body dressing;bathing;lower body dressing;grooming;toileting  -BC       Row Name 11/26/24 0827          Upper Body Dressing Assessment/Training    [place] Level (Upper Body Dressing) doff;don;minimum assist (75% patient effort)  -BC     Position (Upper Body Dressing) unsupported sitting  -BC     Comment, (Upper Body Dressing) seated sinkside for UBD (I)  -BC       Row Name 11/26/24 0827          Bathing Assessment/Intervention     [place] Level (Bathing) bathing skills;upper body;upper extremities;perineal area;minimum assist (75% patient effort)  -BC     Position (Bathing) unsupported sitting  -BC       Row Name 11/26/24 0827          Lower Body Dressing Assessment/Training    [place] Level (Lower Body Dressing) don;doff;shoes/slippers;socks;maximum assist (25% patient effort)  -BC     Position (Lower Body Dressing) unsupported sitting;edge of bed sitting  -BC     Comment, (Lower Body Dressing) difficulty w/ reach LB for socks/shoes mgmt  -BC       Row Name 11/26/24 0827          Grooming Assessment/Training    [place] Level (Grooming) grooming skills;hair care, combing/brushing;oral care regimen;shave face;wash face, hands;set up  -BC     Position (Grooming) supported sitting  -BC     Comment, (Grooming) set up A only seated sinkside for grooming routine  -BC       Row Name 11/26/24 0827          Toileting Assessment/Training    [place] Level (Toileting) moderate assist (50% patient effort)  -BC     Position (Toileting) unsupported sitting  -BC     Comment, (Toileting) Pt spilling urinal in bedding/sheets, A for clean up, able to clean [place] area seated up  -BC               User Key  (r) = Recorded By, (t) = Taken By, (c) = Cosigned By      Initials Name Provider Type    Kandy Ibanez OT Occupational Therapist                   Obj/Interventions       Row Name 11/26/24 0851          Sensory Assessment (Somatosensory)    Sensory Assessment (Somatosensory) sensation intact  -BC     Sensory Assessment diminished BLEs  -BC       Row Name 11/26/24 0851          Vision Assessment/Intervention    Visual Impairment/Limitations WFL;corrective lenses full-time  -BC       Row Name 11/26/24 0851          Range of Motion Comprehensive    General Range of Motion no range of motion deficits identified  -BC       Row Name 11/26/24 0851          Strength Comprehensive (MMT)    Comment, General Manual Muscle Testing (MMT) Assessment  gen'd weakness ~3+/5 to 4/5  -BC       Row Name 11/26/24 0851          Balance    Balance Assessment sitting static balance;standing static balance;standing dynamic balance  -BC     Static Sitting Balance modified independence  -BC     Position, Sitting Balance unsupported  -BC     Static Standing Balance contact guard  -BC     Dynamic Standing Balance minimal assist  -BC     Position/Device Used, Standing Balance walker, front-wheeled  -BC     Balance Interventions standing;supported  -BC               User Key  (r) = Recorded By, (t) = Taken By, (c) = Cosigned By      Initials Name Provider Type    Kandy Ibanez, OT Occupational Therapist                   Goals/Plan       Row Name 11/26/24 1142          Bed Mobility Goal 1 (OT)    Activity/Assistive Device (Bed Mobility Goal 1, OT) bed mobility activities, all  -BC     [place] Level/Cues Needed (Bed Mobility Goal 1, OT) modified independence  -BC     Time Frame (Bed Mobility Goal 1, OT) short term goal (STG);2 weeks  -BC     Progress/Outcomes (Bed Mobility Goal 1, OT) new goal  -BC       Row Name 11/26/24 1142          Transfer Goal 1 (OT)    Activity/Assistive Device (Transfer Goal 1, OT) sit-to-stand/stand-to-sit;bed-to-chair/chair-to-bed;toilet;walker, rolling  -BC     [place] Level/Cues Needed (Transfer Goal 1, OT) supervision required  -BC     Time Frame (Transfer Goal 1, OT) short term goal (STG);2 weeks  -BC     Progress/Outcome (Transfer Goal 1, OT) new goal  -BC       Row Name 11/26/24 1142          Bathing Goal 1 (OT)    Activity/Device (Bathing Goal 1, OT) bathing skills, all  -BC     [place] Level/Cues Needed (Bathing Goal 1, OT) standby assist  -BC     Time Frame (Bathing Goal 1, OT) short term goal (STG);2 weeks  -BC     Progress/Outcomes (Bathing Goal 1, OT) new goal  -BC       Row Name 11/26/24 1142          Dressing Goal 1 (OT)    Activity/Device (Dressing Goal 1, OT) upper body dressing;lower body dressing  -BC      [place]/Cues Needed (Dressing Goal 1, OT) set-up required  -BC     Time Frame (Dressing Goal 1, OT) short term goal (STG);2 weeks  -BC     Progress/Outcome (Dressing Goal 1, OT) new goal  -BC       Row Name 11/26/24 1142          Toileting Goal 1 (OT)    Activity/Device (Toileting Goal 1, OT) toileting skills, all;adjust/manage clothing;perform perineal hygiene  -BC     [place] Level/Cues Needed (Toileting Goal 1, OT) set-up required  -BC     Time Frame (Toileting Goal 1, OT) short term goal (STG);2 weeks  -BC     Progress/Outcome (Toileting Goal 1, OT) new goal  -BC       Row Name 11/26/24 1142          Grooming Goal 1 (OT)    Activity/Device (Grooming Goal 1, OT) grooming skills, all  -BC     [place] (Grooming Goal 1, OT) supervision required  -BC     Time Frame (Grooming Goal 1, OT) 2 weeks;short term goal (STG)  -BC     Strategies/Barriers (Grooming Goal 1, OT) standing sinkside for ADL IND/endurance  -BC     Progress/Outcome (Grooming Goal 1, OT) new goal  -BC       Row Name 11/26/24 1142          Problem Specific Goal 1 (OT)    Problem Specific Goal 1 (OT) Pt will increase ADL IND for 3 step ADL in room w/ walker and close sup A.  -BC     Time Frame (Problem Specific Goal 1, OT) 2 weeks;short term goal (STG)  -BC     Progress/Outcome (Problem Specific Goal 1, OT) new goal  -BC       Row Name 11/26/24 1142          Therapy Assessment/Plan (OT)    Planned Therapy Interventions (OT) activity tolerance training;BADL retraining;functional balance retraining;occupation/activity based interventions;strengthening exercise;transfer/mobility retraining;patient/caregiver education/training  -BC               User Key  (r) = Recorded By, (t) = Taken By, (c) = Cosigned By      Initials Name Provider Type    Kandy Ibanez OT Occupational Therapist                   Clinical Impression       Row Name 11/26/24 1137          Pain Assessment    Pretreatment Pain Rating 1/10  -BC     Posttreatment Pain  Rating 1/10  -BC     Pain Location abdomen  -BC     Pain Side/Orientation lower  -BC     Response to Pain Interventions activity participation with tolerable pain  -BC     Pre/Posttreatment Pain Comment c/o 'some' pain but much improved since admission per pt report  -BC       Row Name 11/26/24 1137          Plan of Care Review    Plan of Care Reviewed With patient  -BC     Progress improving  -BC     Outcome Evaluation Pt is an 78 y/o M admitted to [facility] 11/25 with constipation x1 week. Pt with incarcerated inguinal hernia, SBO, plan for OR Thursday 11/28. PMH of hypertension, type 2 diabetes, A-fib, hyperlipidemia, history of aortic valve replacement, diastolic heart failure, and CKD. Pt verb'd he was recently at [facility] due to Afib and was DC'd home and able to manage but generally weaker and harder to get around. Pt presenting w/ MIN/CGA for mvmts w/ walker in room, needed assistance w/ ADLS today, and was left up seated sinkside for finishing grooming tasks, and returning to room and observed back up to chair w/ nursing staff and CGA. Pt would benefit from rehab at [facility] due to impairments w/ ADl (I)/function, gen'd weakness, decreased home ADL/IADL (I).  -BC       Row Name 11/26/24 1132          Therapy Assessment/Plan (OT)    Patient/Family Therapy Goal Statement (OT) to get stronger  -BC     Rehab Potential (OT) good  -BC     Criteria for Skilled Therapeutic Interventions Met (OT) yes;meets criteria  -BC     Therapy Frequency (OT) 3 times/wk  -BC     Predicted Duration of Therapy Intervention (OT) 2 weeks  -BC       Row Name 11/26/24 113          Therapy Plan Review/Discharge Plan (OT)    Anticipated Discharge Disposition (OT) skilled nursing facility  -BC       Row Name 11/26/24 113          Vital Signs    O2 Delivery Pre Treatment room air  -BC     Post SpO2 (%) 97  -BC     O2 Delivery Post Treatment room air  -BC     Pre Patient Position Supine  -BC     Intra Patient Position Sitting  -BC     Post Patient  Position Sitting  -BC       Row Name 11/26/24 1137          Positioning and Restraints    Pre-Treatment Position in bed  -BC     Post Treatment Position chair  -BC     In Chair notified nsg;reclined;sitting;call light within reach;encouraged to call for assist;exit alarm on  -BC               User Key  (r) = Recorded By, (t) = Taken By, (c) = Cosigned By      Initials Name Provider Type    BC Kandy Abdalla OT Occupational Therapist                   Outcome Measures       Row Name 11/26/24 1133          How much help from another is currently needed...    Putting on and taking off regular lower body clothing? 2  -BC     Bathing (including washing, rinsing, and drying) 2  -BC     Toileting (which includes using toilet bed pan or urinal) 2  -BC     Putting on and taking off regular upper body clothing 3  -BC     Taking care of personal grooming (such as brushing teeth) 3  -BC     Eating meals 3  -BC     AM-PAC 6 Clicks Score (OT) 15  -BC       Row Name 11/26/24 0915 11/26/24 0408       How much help from another person do you currently need...    Turning from your back to your side while in flat bed without using bedrails? 3  -JS 3  -HC    Moving from lying on back to sitting on the side of a flat bed without bedrails? 3  -JS 3  -HC    Moving to and from a bed to a chair (including a wheelchair)? 3  -JS 3  -HC    Standing up from a chair using your arms (e.g., wheelchair, bedside chair)? 3  -JS 3  -HC    Climbing 3-5 steps with a railing? 3  -JS 2  -HC    To walk in hospital room? 3  -JS 2  -HC    AM-PAC 6 Clicks Score (PT) 18  -JS 16  -HC      Row Name 11/26/24 8836          How much help from another person do you currently need...    Turning from your back to your side while in flat bed without using bedrails? 3  -HC     Moving from lying on back to sitting on the side of a flat bed without bedrails? 3  -HC     Moving to and from a bed to a chair (including a wheelchair)? 3  -HC     Standing up from a chair  using your arms (e.g., wheelchair, bedside chair)? 3  -HC     Climbing 3-5 steps with a railing? 1  -HC     To walk in hospital room? 3  -HC     AM-PAC 6 Clicks Score (PT) 16  -HC       Row Name 11/26/24 1133          Functional Assessment    Outcome Measure Options AM-PAC 6 Clicks Daily Activity (OT)  -BC               User Key  (r) = Recorded By, (t) = Taken By, (c) = Cosigned By      Initials Name Provider Type    Yaneth Blackman, RN Registered Nurse     Thea Ruvalcaba, RN Registered Nurse    Kandy Ibanez, KHARI Occupational Therapist                    Occupational Therapy Education       Title: PT OT SLP Therapies (In Progress)       Topic: Occupational Therapy (In Progress)       Point: ADL training (Done)       Description:   Instruct learner(s) on proper safety adaptation and remediation techniques during self care or transfers.   Instruct in proper use of assistive devices.                  Learning Progress Summary            Patient Acceptance, E,TB, VU by BC at 11/26/2024 1144    Comment: Role of OT at acute level, DC recs/safety                      Point: Home exercise program (Not Started)       Description:   Instruct learner(s) on appropriate technique for monitoring, assisting and/or progressing therapeutic exercises/activities.                  Learner Progress:  Not documented in this visit.              Point: Precautions (Not Started)       Description:   Instruct learner(s) on prescribed precautions during self-care and functional transfers.                  Learner Progress:  Not documented in this visit.              Point: Body mechanics (Done)       Description:   Instruct learner(s) on proper positioning and spine alignment during self-care, functional mobility activities and/or exercises.                  Learning Progress Summary            Patient Acceptance, E,TB, VU by BC at 11/26/2024 1144    Comment: Role of OT at acute level, DC recs/safety                                       User Key       Initials Effective Dates Name Provider Type Discipline    BC 07/29/24 -  Kandy Abdalla, KHARI Occupational Therapist OT                  OT Recommendation and Plan  Planned Therapy Interventions (OT): activity tolerance training, BADL retraining, functional balance retraining, occupation/activity based interventions, strengthening exercise, transfer/mobility retraining, patient/caregiver education/training  Therapy Frequency (OT): 3 times/wk  Plan of Care Review  Plan of Care Reviewed With: patient  Progress: improving  Outcome Evaluation: Pt is an 80 y/o M admitted to [facility] 11/25 with constipation x1 week. Pt with incarcerated inguinal hernia, SBO, plan for OR Thursday 11/28. PMH of hypertension, type 2 diabetes, A-fib, hyperlipidemia, history of aortic valve replacement, diastolic heart failure, and CKD. Pt verb'd he was recently at [facility] due to Afib and was DC'd home and able to manage but generally weaker and harder to get around. Pt presenting w/ MIN/CGA for mvmts w/ walker in room, needed assistance w/ ADLS today, and was left up seated sinkside for finishing grooming tasks, and returning to room and observed back up to chair w/ nursing staff and CGA. Pt would benefit from rehab at [facility] due to impairments w/ ADl (I)/function, gen'd weakness, decreased home ADL/IADL (I).     Time Calculation:   Evaluation Complexity (OT)  Review Occupational Profile/Medical/Therapy History Complexity: brief/low complexity  Assessment, Occupational Performance/Identification of Deficit Complexity: 3-5 performance deficits  Clinical Decision Making Complexity (OT): problem focused assessment/low complexity  Overall Complexity of Evaluation (OT): low complexity     Time Calculation- OT       Row Name 11/26/24 1145             Time Calculation- OT    OT Start Time 0801  -BC      OT Stop Time 0836  -BC      OT Time Calculation (min) 35 min  -BC      Total Timed Code Minutes- OT 24 minute(s)  -BC      OT Received On  11/26/24  -BC      OT - Next Appointment 11/29/24  -BC      OT Goal Re-Cert Due Date 12/10/24  -BC         Timed Charges    57412 - OT Self Care/Mgmt Minutes 24  -BC         Total Minutes    Timed Charges Total Minutes 24  -BC       Total Minutes 24  -BC                User Key  (r) = Recorded By, (t) = Taken By, (c) = Cosigned By      Initials Name Provider Type    BC Kandy Abdalla OT Occupational Therapist                  Therapy Charges for Today       Code Description Service Date Service Provider Modifiers Qty    [id number]  OT SELF CARE/MGMT/TRAIN EA 15 MIN 11/26/2024 Kandy Abdalla OT GO 2    [id number]  OT EVAL LOW COMPLEXITY 2 11/26/2024 Kandy Abdalla OT GO 1                 Kandy Abdalla OT  11/26/2024

## 2024-11-26 NOTE — H&P
Patient Name:  Rock Maciel Jr.  YOB: 1945  MRN:  [id number]  Admit Date:  11/25/2024  Patient Care Team:  Denise Dickson APRN as PCP - General (Nurse Practitioner)  Azam Banegas Jr., MD as Consulting Physician (Cardiology)  Jamil Stevens MD as Consulting Physician (Nephrology)      Subjective   History Present Illness     Chief Complaint   Patient presents with    Constipation       Mr. Maciel is a 79 y.o. male with a history of DMII, Afib, HLD, aortic valve replacement, Diastolic CHF, CKD that presents to [facility] complaining of constipation and distended abdomen.  Patient states he was constipated since his most recent hospital stay when he was discharged on 11/15.  He had a large bowel movement after suppository on 11/15.  He reportedly did not have a bowel movement since that time and continued to have a distended and uncomfortable abdomen.  No vomiting.  He was not able to eat or drink very much for close to 7 days.  Last night in the ED he was found to have obstruction and left inguinal hernia which was reduced by general surgeon in the ED.  He also received an enema in the ED and had a large BM result.  He was also found to have KILLIAN and admitted to the hospital for further evaluation and treatment.    Currently he feels much better and denies abdominal pain, nausea or vomiting.  No shortness of breath, cough, chest pain or palpitations.  No dizziness or lightheadedness.  No fever, chills, sweats.    Patient was admitted to [facility] from 11/7 through 11/15/2024 due to cardioversion.  He was evaluated by pulmonology and sent home on oxygen.  Started on amiodarone 200 mg twice daily.  He was instructed to follow-up with urology and nephrology.        Review of Systems   Constitutional:  Positive for appetite change. Negative for chills and fever.   Respiratory:  Negative for cough and shortness of breath.    Cardiovascular:  Negative for chest pain and  palpitations.   Genitourinary:  Negative for difficulty urinating and dysuria.   Neurological:  Negative for dizziness and light-headedness.        Personal History     Past Medical History:   Diagnosis Date    Allergic rhinitis     Bronchitis     Coronary artery disease     Diabetes mellitus 2001    DM (diabetes mellitus)     Encounter for annual health examination 05/06/2014    Annual Health Assessment    Gout     Hiatal hernia     History of MRSA infection     RIGHT FOOT 2010    Hyperlipidemia     Hypertension     Murmur     Pneumothorax on right     Sleep apnea     pt wears CPAP at night    Wellness examination 06/24/2015    Annual Wellness Visit     Past Surgical History:   Procedure Laterality Date    ARTERY SURGERY Bilateral     carotid    CARDIAC CATHETERIZATION N/A 7/20/2018    Procedure: Left Heart Cath;  Surgeon: Jo Toney MD;  Location: [facility] CATH INVASIVE LOCATION;  Service: Cardiovascular    CARDIAC CATHETERIZATION N/A 7/20/2018    Procedure: Coronary angiography;  Surgeon: Jo Toney MD;  Location: [facility] CATH INVASIVE LOCATION;  Service: Cardiovascular    CAROTID ENDARTERECTOMY Bilateral     COLONOSCOPY  2008    CORONARY ARTERY BYPASS GRAFT WITH AORTIC VALVE REPAIR/REPLACEMENT N/A 7/23/2018    Procedure: INTRAOPERATIVE ALEX, MIDLINE STERNOTOMY, CORONARY ARTERY BYPASS GRAFTING X 3 USING ENDOSCOPICALLY HARVESTED LEFT GREATER SAPHENOUS VEIN,  AORTIC VALVE REPLACEMENT USING 25MM LOPEZ II ULTRA PORCINE VALVE, PRP;  Surgeon: Phong Posey MD;  Location: [facility] OR;  Service: Cardiothoracic    FOOT SURGERY Right 2010    5th digit removal    FOOT SURGERY Left 2011    1 digit removed    INCISION AND DRAINAGE LEG Right 3/28/2020    Procedure: DEBRIDMENT OF RIGHT CALF;  Surgeon: Ross Pineda MD;  Location: [facility] OR;  Service: Vascular;  Laterality: Right;    QUADRICEPS TENDON REPAIR Left 5/14/2019    Procedure: Left QUADRICEPS TENDON REPAIR;  Surgeon: Dale  Camilo REAL MD;  Location: [facility] OR;  Service: Orthopedics    THORACENTESIS Right 11/21/2016    THORACOSCOPY Right 5/8/2017    Procedure: BRONCHOSCOPY, RIGHT VAT,  TOTAL DECORTICATION RIGHT LUNG, PLEURAL BX, PLACEMENT SUBPLEURAL PAIN CAATHETERS X2;  Surgeon: Donald Orlando III, MD;  Location: [facility] OR;  Service:     TONSILECTOMY, ADENOIDECTOMY, BILATERAL MYRINGOTOMY AND TUBES       Family History   Problem Relation Age of Onset    Hypertension Father      Social History     Tobacco Use    Smoking status: Never     Passive exposure: Never    Smokeless tobacco: Never   Vaping Use    Vaping status: Never Used   Substance Use Topics    Alcohol use: Yes     Comment: either one glass wine or one glass liquor per  day    Drug use: Never     No current facility-administered medications on file prior to encounter.     Current Outpatient Medications on File Prior to Encounter   Medication Sig Dispense Refill    amiodarone (PACERONE) 200 MG tablet Take 1 tablet by mouth Daily. 60 tablet 1    apixaban (ELIQUIS) 2.5 MG tablet tablet Take 1 tablet by mouth 2 (Two) Times a Day. Indications: Atrial Fibrillation 60 tablet 5    bumetanide (BUMEX) 1 MG tablet Take 1 tablet by mouth 2 (Two) Times a Day. 60 tablet 5    Cholecalciferol 25 MCG (1000 UT) tablet Take 1 tablet by mouth Daily.      glipizide (GLUCOTROL) 5 MG tablet TAKE 1 TABLET BY MOUTH 2 TIMES A DAY BEFORE MEALS. 180 tablet 1    hydrALAZINE (APRESOLINE) 25 MG tablet Take 1 tablet by mouth 3 (Three) Times a Day. 90 tablet 5    linagliptin (Tradjenta) 5 MG tablet tablet Take 1 tablet by mouth Daily. 90 tablet 1    metoprolol succinate XL (TOPROL-XL) 25 MG 24 hr tablet Take 1 tablet by mouth Daily. 30 tablet 5    vitamin C (ASCORBIC ACID) 250 MG tablet Take 1 tablet by mouth Daily.      Zinc 50 MG tablet       atorvastatin (LIPITOR) 20 MG tablet Take 1 tablet by mouth Every Night. NEED TO SEE PROVIDER FOR FUTURE REFILLS. 90 tablet 1    clopidogrel (PLAVIX) 75 MG  tablet Take 1 tablet by mouth Daily. 90 tablet 1    glucose blood (Accu-Chek Keshia Plus) test strip USE TO TEST BLOOD SUGAR    TWICE DAILY FOR DIABETES 100 each 8    nitroglycerin (NITROSTAT) 0.4 MG SL tablet Place 1 tablet under the tongue Every 5 (Five) Minutes As Needed for Chest Pain (Only if SBP Greater Than 100). Take no more than 3 doses in 15 minutes. 25 tablet 0    terazosin (HYTRIN) 5 MG capsule TAKE 1 CAPSULE BY MOUTH EVERY DAY AT BEDTIME FOR 90 DAYS       Allergies   Allergen Reactions    Ceclor [Cefaclor] Rash     Rash in his 50s; he tolerated piperacillin-tazobactam in March 2020       Objective    Objective     Vital Signs  Temp:  [97.4 °F (36.3 °C)-98.4 °F (36.9 °C)] 98 °F (36.7 °C)  Heart Rate:  [69-86] 72  Resp:  [16-18] 16  BP: (125-162)/(43-71) 127/55  SpO2:  [93 %-100 %] 94 %  on   ;   Device (Oxygen Therapy): room air  Body mass index is 34.57 kg/m².    Physical Exam  Constitutional:       General: He is not in acute distress.     Appearance: He is obese. He is not ill-appearing or toxic-appearing.   HENT:      Head: Normocephalic and atraumatic.      Nose: Nose normal.   Eyes:      Extraocular Movements: Extraocular movements intact.      Conjunctiva/sclera: Conjunctivae normal.      Pupils: Pupils are equal, round, and reactive to light.   Cardiovascular:      Rate and Rhythm: Normal rate and regular rhythm.   Abdominal:      General: There is no distension.      Palpations: Abdomen is soft.      Comments: No tenderness.  Bowel sounds hypoactive throughout.   Musculoskeletal:         General: No swelling or tenderness. Normal range of motion.   Skin:     General: Skin is warm and dry.   Neurological:      General: No focal deficit present.      Mental Status: He is alert and oriented to person, place, and time.   Psychiatric:         Mood and Affect: Mood normal.         Results Review:  I reviewed the patient's new clinical results.      Lab Results (last 24 hours)       Procedure Component  Value Units Date/Time    CBC & Differential [[id number]]  (Abnormal) Collected: 11/25/24 2231    Specimen: Blood Updated: 11/25/24 2243    Narrative:      The following orders were created for panel order CBC & Differential.  Procedure                               Abnormality         Status                     ---------                               -----------         ------                     CBC Auto Differential[[id number]]        Abnormal            Final result                 Please view results for these tests on the individual orders.    Comprehensive Metabolic Panel [[id number]]  (Abnormal) Collected: 11/25/24 2231    Specimen: Blood Updated: 11/25/24 2300     Glucose 175 mg/dL      BUN 71 mg/dL      Creatinine 3.32 mg/dL      Sodium 133 mmol/L      Potassium 3.7 mmol/L      Chloride 91 mmol/L      CO2 29.0 mmol/L      Calcium 9.5 mg/dL      Total Protein 7.8 g/dL      Albumin 4.1 g/dL      ALT (SGPT) 16 U/L      AST (SGOT) 8 U/L      Alkaline Phosphatase 115 U/L      Total Bilirubin 0.5 mg/dL      Globulin 3.7 gm/dL      A/G Ratio 1.1 g/dL      BUN/Creatinine Ratio 21.4     Anion Gap 13.0 mmol/L      eGFR 18.0 mL/min/1.73     Narrative:      GFR Normal >60  Chronic Kidney Disease <60  Kidney Failure <15    The GFR formula is only valid for adults with stable renal function between ages 18 and 70.    Lipase [[id number]]  (Normal) Collected: 11/25/24 2231    Specimen: Blood Updated: 11/25/24 2300     Lipase 43 U/L     CBC Auto Differential [[id number]]  (Abnormal) Collected: 11/25/24 2231    Specimen: Blood Updated: 11/25/24 2243     WBC 11.29 10*3/mm3      RBC 3.70 10*6/mm3      Hemoglobin 10.8 g/dL      Hematocrit 33.5 %      MCV 90.5 fL      MCH 29.2 pg      MCHC 32.2 g/dL      RDW 14.2 %      RDW-SD 46.8 fl      MPV 9.9 fL      Platelets 304 10*3/mm3      Neutrophil % 86.3 %      Lymphocyte % 6.3 %      Monocyte % 6.0 %      Eosinophil % 0.7 %      Basophil % 0.2 %      Immature Grans % 0.5 %       Neutrophils, Absolute 9.74 10*3/mm3      Lymphocytes, Absolute 0.71 10*3/mm3      Monocytes, Absolute 0.68 10*3/mm3      Eosinophils, Absolute 0.08 10*3/mm3      Basophils, Absolute 0.02 10*3/mm3      Immature Grans, Absolute 0.06 10*3/mm3      nRBC 0.0 /100 WBC     Urinalysis With Microscopic If Indicated (No Culture) - Urine, Clean Catch [[id number]]  (Abnormal) Collected: 11/26/24 0207    Specimen: Urine, Clean Catch Updated: 11/26/24 0243     Color, UA Yellow     Appearance, UA Clear     pH, UA 5.5     Specific Gravity, UA 1.012     Glucose, UA Negative     Ketones, UA Negative     Bilirubin, UA Negative     Blood, UA Large (3+)     Protein, UA 30 mg/dL (1+)     Leuk Esterase, UA Negative     Nitrite, UA Negative     Urobilinogen, UA 0.2 E.U./dL    Urinalysis, Microscopic Only - Urine, Clean Catch [[id number]]  (Abnormal) Collected: 11/26/24 0207    Specimen: Urine, Clean Catch Updated: 11/26/24 0243     RBC, UA 11-20 /HPF      WBC, UA 0-2 /HPF      Bacteria, UA None Seen /HPF      Squamous Epithelial Cells, UA 0-2 /HPF      Hyaline Casts, UA 7-12 /LPF      Methodology Automated Microscopy    Sodium, Urine, Random - Urine, Clean Catch [[id number]] Collected: 11/26/24 0207    Specimen: Urine, Clean Catch Updated: 11/26/24 0745     Sodium, Urine 39 mmol/L     Narrative:      Reference intervals for random urine have not been established.  Clinical usage is dependent upon physician's interpretation in combination with other laboratory tests.       Potassium, Urine, Random - Urine, Clean Catch [[id number]] Collected: 11/26/24 0207    Specimen: Urine, Clean Catch Updated: 11/26/24 0745     Potassium, Urine 21.8 mmol/L     Narrative:      Reference intervals for random urine have not been established.  Clinical usage is dependent upon physician's interpretation in combination with other laboratory tests.       Chloride, Urine, Random - Urine, Clean Catch [[id number]] Collected: 11/26/24 0207    Specimen: Urine, Clean Catch  Updated: 11/26/24 0745     Chloride, Urine 26 mmol/L     Narrative:      Reference intervals for random urine have not been established.  Clinical usage is dependent upon physician's interpretation in combination with other laboratory tests.       Osmolality, Urine - Urine, Clean Catch [[id number]] Collected: 11/26/24 0207    Specimen: Urine, Clean Catch Updated: 11/26/24 0814     Osmolality, Urine 334 mOsm/kg     Narrative:      Osmo Normal Reference Ranges:    Random:  mOsm/kg H2O, depending on fluid intake.  Random: >850 mOsm/kg H20, after 12 hour fluid restriction.    24 Hour: 300-900 mOsm/kg H2O.    Urea Nitrogen, Urine - Urine, Clean Catch [[id number]] Collected: 11/26/24 0207    Specimen: Urine, Clean Catch Updated: 11/26/24 0745     Urea Nitrogen, Urine 495 mg/dL     Narrative:      Reference intervals for random urine have not been established.  Clinical usage is dependent upon physician's interpretation in combination with other laboratory tests.       Basic Metabolic Panel [[id number]]  (Abnormal) Collected: 11/26/24 0622    Specimen: Blood Updated: 11/26/24 0657     Glucose 111 mg/dL      BUN 66 mg/dL      Creatinine 2.85 mg/dL      Sodium 137 mmol/L      Potassium 3.5 mmol/L      Chloride 95 mmol/L      CO2 30.5 mmol/L      Calcium 8.5 mg/dL      BUN/Creatinine Ratio 23.2     Anion Gap 11.5 mmol/L      eGFR 21.7 mL/min/1.73     Narrative:      GFR Normal >60  Chronic Kidney Disease <60  Kidney Failure <15    The GFR formula is only valid for adults with stable renal function between ages 18 and 70.    CBC (No Diff) [[id number]]  (Abnormal) Collected: 11/26/24 0622    Specimen: Blood Updated: 11/26/24 0640     WBC 9.90 10*3/mm3      RBC 3.39 10*6/mm3      Hemoglobin 9.4 g/dL      Hematocrit 30.2 %      MCV 89.1 fL      MCH 27.7 pg      MCHC 31.1 g/dL      RDW 14.0 %      RDW-SD 45.6 fl      MPV 9.8 fL      Platelets 277 10*3/mm3     POC Glucose Once [[id number]]  (Normal) Collected: 11/26/24 0656     Specimen: Blood Updated: 11/26/24 0658     Glucose 107 mg/dL     POC Glucose Once [[id number]]  (Normal) Collected: 11/26/24 1152    Specimen: Blood Updated: 11/26/24 1153     Glucose 115 mg/dL             Imaging Results (Last 24 Hours)       Procedure Component Value Units Date/Time    CT Abdomen Pelvis Without Contrast [[id number]] Collected: 11/25/24 2326     Updated: 11/25/24 2338    Narrative:      CT OF THE ABDOMEN PELVIS WITHOUT CONTRAST     HISTORY: Abdominal pain and bloating     COMPARISON: None available.     TECHNIQUE: Axial CT imaging was obtained through the abdomen and pelvis.  No IV contrast was administered.     FINDINGS:  Images through the lung bases demonstrate some rounded atelectasis  within the right lower lobe. There are calcified pleural plaques noted  on the left. There is enlargement of the main pulmonary artery, which  can be seen in the setting of pulmonary arterial hypertension. No  suspicious hepatic lesions are seen. There is a small hiatal hernia,  which is distended with fluid. The duodenum, adrenal glands, spleen, and  pancreas are normal. Gallbladder is absent. No hydronephrosis is seen on  either side. There is a 4 mm nonobstructing stone within the right  kidney. There is a simple appearing left renal cyst. No additional  follow-up is necessary. There are aortoiliac calcifications. Prostate  gland is within normal limits. Urinary bladder is distended, and there  is a bladder diverticulum. The patient has marked distention of the  colon from the cecum to the distal descending colon. The rectum and  sigmoid colon are completely collapsed. Appearance is concerning for  obstruction. The transition point appears to be a left inguinal hernia,  which contains a loop of distal descending colon no pneumatosis or free  air is seen. There is trace free fluid noted within the abdomen. There  is no evidence of small bowel obstruction. No acute osseous  abnormalities are seen. There is no  evidence of appendicitis. There is  some relative muscular atrophy of the left psoas musculature and left  gluteal musculature.       Impression:      The patient has a colonic obstruction, with transition point noted  within a left inguinal hernia sac. No pneumatosis or free air is seen at  this time, although there is a small amount of free fluid which is noted  within the abdomen.     Radiation dose reduction techniques were utilized, including automated  exposure control and exposure modulation based on body size.        This report was finalized on 11/25/2024 11:35 PM by Dr. Inocencia Cruz M.D on Workstation: [id number]               Results for orders placed during the hospital encounter of 10/03/24    Adult Transthoracic Echo Complete W/ Cont if Necessary Per Protocol    Interpretation Summary    Left ventricular ejection fraction appears to be 61 - 65%.    Left ventricular diastolic function was indeterminate.    The left atrial cavity is moderately dilated.    Left atrial volume is moderately increased.    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    Estimated right ventricular systolic pressure from tricuspid regurgitation is moderately elevated (45-55 mmHg).      ECG 12 Lead Rhythm Change   Final Result   HEART RATE=79  bpm   RR Mxtjzjlb=971  ms   MD Cnpkknmr=812  ms   P Horizontal Axis=267  deg   P Front Axis=-54  deg   QRSD Egllsyll=727  ms   QT Fzcgemgs=154  ms   CSdZ=461  ms   QRS Axis=-60  deg   T Wave Axis=76  deg   - ABNORMAL ECG -   Sinus or ectopic atrial rhythm   RBBB and LAFB   Probable  left ventricular hypertrophy   No change from previous tracing   Electronically Signed By: Sharita Rubio ([facility]) 2024-11-26 08:56:47   Date and Time of Study:2024-11-26 05:59:59      Telemetry Scan   Final Result      Telemetry Scan   Final Result      Telemetry Scan   Final Result           Assessment/Plan     Active Hospital Problems    Diagnosis  POA    **Incarcerated inguinal hernia [K40.30]  Yes     Atrial fibrillation, persistent [I48.19]  Yes    KILLIAN (acute kidney injury) [N17.9]  Yes    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     Continue IV fluids with normal saline at 100.  Hold Bumex and hydralazine.  Consult nephrology.  Check urine electrolytes.  Check repeat labs in the morning.    General surgery following.    Continue amiodarone, metoprolol.  Eliquis on hold.    Continue Lipitor, statin.    Continue Tradjenta.  Additional coverage with lispro SSI.    I discussed the patient's findings and my recommendations with patient.    VTE Prophylaxis - SCDs.  Code Status - Full code.       ZEINA Reyes  [facility]  11/26/24  14:11 EST

## 2024-11-26 NOTE — ED PROVIDER NOTES
MD ATTESTATION NOTE    The LIANNE and I have discussed this patient's history, physical exam, and treatment plan.  I have reviewed the documentation and personally had a face to face interaction with the patient. I affirm the documentation and agree with the treatment and plan.  The attached note describes my personal findings.      I provided a substantive portion of the care of the patient.  I personally performed the physical exam in its entirety, and below are my findings.        Brief HPI: This patient is an 79-year-old male with a history of anticoagulation dependent atrial fibrillation as well as an aortic valve replacement presenting to the emergency room today with generalized abdominal discomfort with groin pain as well as no bowel movement for over a week now.  He denies dysuria/hematuria, fever/chills, chest pain, or shortness of breath.  He reports decreased appetite and he has not been eating or drinking hardly at all for the last 1 week.      PHYSICAL EXAM  ED Triage Vitals   Temp Heart Rate Resp BP SpO2   11/25/24 2003 11/25/24 2003 11/25/24 2003 11/25/24 2009 11/25/24 2003   97.9 °F (36.6 °C) 86 18 162/71 95 %      Temp src Heart Rate Source Patient Position BP Location FiO2 (%)   11/25/24 2003 11/25/24 2003 11/25/24 2009 11/25/24 2009 --   Tympanic Monitor Sitting Right arm          GENERAL: Resting comfortably and in no acute distress, nontoxic in appearance  HENT: nares patent  EYES: no scleral icterus  CV: regular rhythm, normal rate, no M/R/G  RESPIRATORY: normal effort, lungs clear bilaterally  ABDOMEN: soft, nontender, no rebound or guarding, there does appear to be an incarcerated left inguinal hernia present to his left groin  MUSCULOSKELETAL: no deformity, no edema  NEURO: alert, moves all extremities, follows commands  PSYCH:  calm, cooperative  SKIN: warm, dry    Vital signs and nursing notes reviewed.      Differential diagnosis includes but is not limited to bowel obstruction,  constipation, intraperitoneal free air, acute diverticulitis, or acute colitis.      Plan: We will obtain labs as well as a CT scan of the abdomen and pelvis.  We will monitor closely and reassess following results.      CT scan of the abdomen and pelvis independently interpreted by myself with my interpretation showing a left inguinal hernia with resultant bowel obstruction.  No free air seen.       Otoniel Brenner MD  11/26/24 0012

## 2024-11-26 NOTE — CONSULTS
INITIAL CONSULT NOTE      Patient Name: Rock Maciel Jr.  : 1945  MRN: [id number]  Primary Care Physician: Denise Dickson APRN  Date of admission: 2024    Patient Care Team:  Denise Dickson APRN as PCP - General (Nurse Practitioner)  Azam Banegas Jr., MD as Consulting Physician (Cardiology)  Jamil Stevens MD as Consulting Physician (Nephrology)        Reason for Consult:       KILLIAN on CKD     Subjective   History of Present Illness:   Chief Complaint:   Chief Complaint   Patient presents with    Constipation     HISTORY:  The patient, Mr Rock Maciel Jr. is a 79 y.o. male with past medical history of: coronary artery disease, status post CABG x 2 in 2019, AVR in 2019, paroxysmal A-fib, on Eliquis, hypertension, coronary artery disease, status post CEA, DM type II, hyperlipidemia, with prior echo showed EF of 61 to 65%, intermediate LV diastolic function.  Left atrial cavity and moderately dilated.     Further history to include: chronic kidney disease stage III, with baseline creatinine ~ 2.0, secondary to diabetic and hypertensive nephropathy, creatinine 1.97 at last office appointment 2024. Prior serology negative, hepatitis profile negative, SPEP with immunofixation negative for monoclonal protein, serum free light chain ratio 1.5, serum complement C3-C4 normal, ZOIE negative, c-ANCA and p-ANCA negative. Prior urinalysis from 2024 with trace protein, UA from  with 30mg protein, large blood, 11-20 RBC. Urine osmolality 334, urine sodium 39. Prior renal US from 24 with right kidney 10.7cm, left kidney 11.9 cm, no right sided hydronephrosis, the left kidney is significantly obscured by overlying shadowing and unfortunately left-sided renal pathology cannot be excluded. Home medications reviewed and noted to be on: Bumex 1mg PO BID, no acei/arb, no NSAIDs.    Patient presented to [facility] ER overnight with complaints of abdominal pain, reported constipation.  CT AP WO with colonic obstruction, with transition point noted within a left inguinal hernia sac. No pneumatosis or free air is seen at this time, although there is a small amount of free fluid which is noted within the abdomen, surgery evaluating. Labs today significant for: BUN 66 creatinine 2.85 (3.3 on admit) calcium 8.5 wbc 9.9 hemoglobin 9.4. He remains on IV fluids, NS at 100ml/hr.     Renal services requested for evaluation and management of KILLIAN on CKD.   Review of systems:  Constitutional: weakness.  HEENT:  No headache, otalgia, itchy eyes, nasal discharge or sore throat.  Cardiac:  No chest pain, dyspnea, orthopnea or PND.  Chest:              No cough, phlegm or wheezing.  Abdomen:  abdominal pain  Neuro:  No focal weakness, abnormal movements or seizure-like activity.  :   No hematuria, no pyuria, no dysuria, no flank pain.  ROS was otherwise negative except as mentioned in the [place].       Personal History:     Past Medical History:   Past Medical History:   Diagnosis Date    Allergic rhinitis     Bronchitis     Coronary artery disease     Diabetes mellitus 2001    DM (diabetes mellitus)     Encounter for annual health examination 05/06/2014    Annual Health Assessment    Gout     Hiatal hernia     History of MRSA infection     RIGHT FOOT 2010    Hyperlipidemia     Hypertension     Murmur     Pneumothorax on right     Sleep apnea     pt wears CPAP at night    Wellness examination 06/24/2015    Annual Wellness Visit       Surgical History:      Past Surgical History:   Procedure Laterality Date    ARTERY SURGERY Bilateral     carotid    CARDIAC CATHETERIZATION N/A 7/20/2018    Procedure: Left Heart Cath;  Surgeon: Jo Toney MD;  Location: [facility] CATH INVASIVE LOCATION;  Service: Cardiovascular    CARDIAC CATHETERIZATION N/A 7/20/2018    Procedure: Coronary angiography;  Surgeon: Jo Toney MD;  Location: [facility] CATH INVASIVE LOCATION;  Service: Cardiovascular    CAROTID  ENDARTERECTOMY Bilateral     COLONOSCOPY  2008    CORONARY ARTERY BYPASS GRAFT WITH AORTIC VALVE REPAIR/REPLACEMENT N/A 7/23/2018    Procedure: INTRAOPERATIVE ALEX, MIDLINE STERNOTOMY, CORONARY ARTERY BYPASS GRAFTING X 3 USING ENDOSCOPICALLY HARVESTED LEFT GREATER SAPHENOUS VEIN,  AORTIC VALVE REPLACEMENT USING 25MM LOPEZ II ULTRA PORCINE VALVE, PRP;  Surgeon: Phong Posey MD;  Location: [facility];  Service: Cardiothoracic    FOOT SURGERY Right 2010    5th digit removal    FOOT SURGERY Left 2011    1 digit removed    INCISION AND DRAINAGE LEG Right 3/28/2020    Procedure: DEBRIDMENT OF RIGHT CALF;  Surgeon: Ross Pineda MD;  Location: [facility] OR;  Service: Vascular;  Laterality: Right;    QUADRICEPS TENDON REPAIR Left 5/14/2019    Procedure: Left QUADRICEPS TENDON REPAIR;  Surgeon: Camilo Hunter MD;  Location: [facility] OR;  Service: Orthopedics    THORACENTESIS Right 11/21/2016    THORACOSCOPY Right 5/8/2017    Procedure: BRONCHOSCOPY, RIGHT VAT,  TOTAL DECORTICATION RIGHT LUNG, PLEURAL BX, PLACEMENT SUBPLEURAL PAIN CAATHETERS X2;  Surgeon: Donald Orlando III, MD;  Location: [facility];  Service:     TONSILECTOMY, ADENOIDECTOMY, BILATERAL MYRINGOTOMY AND TUBES         Family History: family history includes Hypertension in his father. Otherwise pertinent FHx was reviewed and unremarkable.     Social History:  reports that he has never smoked. He has never been exposed to tobacco smoke. He has never used smokeless tobacco. He reports current alcohol use. He reports that he does not use drugs.    Medications:  Prior to Admission medications    Medication Sig Start Date End Date Taking? Authorizing Provider   amiodarone (PACERONE) 200 MG tablet Take 1 tablet by mouth Daily. 11/21/24  Yes Jo Toney MD   apixaban (ELIQUIS) 2.5 MG tablet tablet Take 1 tablet by mouth 2 (Two) Times a Day. Indications: Atrial Fibrillation 11/15/24  Yes Apurva Smith APRN    bumetanide (BUMEX) 1 MG tablet Take 1 tablet by mouth 2 (Two) Times a Day. 11/15/24  Yes Apuvra Smith APRN   Cholecalciferol 25 MCG (1000 UT) tablet Take 1 tablet by mouth Daily.   Yes Shivam Smith MD   glipizide (GLUCOTROL) 5 MG tablet TAKE 1 TABLET BY MOUTH 2 TIMES A DAY BEFORE MEALS. 10/29/24  Yes Denise Dickson APRN   hydrALAZINE (APRESOLINE) 25 MG tablet Take 1 tablet by mouth 3 (Three) Times a Day. 11/16/24  Yes Apurva Smith APRN   linagliptin (Tradjenta) 5 MG tablet tablet Take 1 tablet by mouth Daily. 11/19/24  Yes Denise Dickson APRN   metoprolol succinate XL (TOPROL-XL) 25 MG 24 hr tablet Take 1 tablet by mouth Daily. 11/15/24  Yes Apurva Smith APRN   vitamin C (ASCORBIC ACID) 250 MG tablet Take 1 tablet by mouth Daily.   Yes Shivam Smith MD   Zinc 50 MG tablet  2/19/22  Yes Shivam Smith MD   atorvastatin (LIPITOR) 20 MG tablet Take 1 tablet by mouth Every Night. NEED TO SEE PROVIDER FOR FUTURE REFILLS. 10/10/24   Denise Dickson APRN   clopidogrel (PLAVIX) 75 MG tablet Take 1 tablet by mouth Daily. 10/16/24   Denise Dickson APRN   glucose blood (Accu-Chek Keshia Plus) test strip USE TO TEST BLOOD SUGAR    TWICE DAILY FOR DIABETES 7/29/24   Denise Dickson APRN   nitroglycerin (NITROSTAT) 0.4 MG SL tablet Place 1 tablet under the tongue Every 5 (Five) Minutes As Needed for Chest Pain (Only if SBP Greater Than 100). Take no more than 3 doses in 15 minutes. 11/15/24   Apurva Smith APRN   terazosin (HYTRIN) 5 MG capsule TAKE 1 CAPSULE BY MOUTH EVERY DAY AT BEDTIME FOR 90 DAYS 8/1/24   Shivam Smith MD     Scheduled Meds:amiodarone, 200 mg, Oral, Daily  vitamin C, 250 mg, Oral, Daily  atorvastatin, 20 mg, Oral, Nightly  cholecalciferol, 1,000 Units, Oral, Daily  insulin lispro, 2-7 Units, Subcutaneous, 4x Daily AC & at Bedtime  linagliptin, 5 mg, Oral, Daily  Menthol-Zinc Oxide, 1 Application, Topical, Q12H  metoprolol  succinate XL, 25 mg, Oral, Daily  sodium chloride, 10 mL, Intravenous, Q12H  terazosin, 1 mg, Oral, Nightly      Continuous Infusions:sodium chloride, 100 mL/hr, Last Rate: 50 mL/hr (11/26/24 0732)      PRN Meds:  acetaminophen **OR** acetaminophen **OR** acetaminophen    senna-docusate sodium **AND** polyethylene glycol **AND** bisacodyl **AND** bisacodyl    calcium carbonate    dextrose    dextrose    glucagon (human recombinant)    labetalol    ondansetron ODT **OR** ondansetron    sodium chloride    sodium chloride  Allergies:    Allergies   Allergen Reactions    Ceclor [Cefaclor] Rash     Rash in his 50s; he tolerated piperacillin-tazobactam in March 2020       Objective   Exam:     Vital Signs  Temp:  [97.4 °F (36.3 °C)-98.4 °F (36.9 °C)] 97.5 °F (36.4 °C)  Heart Rate:  [69-86] 69  Resp:  [16-18] 16  BP: (125-162)/(43-71) 125/43  SpO2:  [93 %-100 %] 99 %  on   ;   Device (Oxygen Therapy): room air  Body mass index is 34.57 kg/m².  EXAM  General:  ill appearing male in no acute distress.    Head:      Normocephalic and atraumatic.    Eyes:      PERRL/EOM intact, conjunctivae and sclerae clear without nystagmus.    Neck:      No masses, thyromegaly,  trachea central   Lungs:    Clear bilaterally to auscultation.    Heart:      Regular rate and rhythm, no murmur no gallop  Abd:        Soft, nontender, not distended, bowel sounds positive, no shifting dullness.  Msk:        No deformity or scoliosis noted of thoracic or lumbar spine.    Pulses:   Pulses normal in all 4 extremities.    Extremities:        No cyanosis or clubbing--edema.    Neuro:    No focal deficits.   alert oriented x3  Skin:       Intact without lesions or rashes.    Psych:    Alert and cooperative; normal mood and affect; normal attention span       Results Review:  I have personally reviewed most recent Data :  [facility]  CBC    Results from last 7 days   Lab Units 11/26/24  0622 11/25/24  2231   WBC 10*3/mm3 9.90 11.29*    HEMOGLOBIN g/dL 9.4* 10.8*   PLATELETS 10*3/mm3 277 304     CMP   Results from last 7 days   Lab Units 11/26/24  0622 11/25/24  2231   SODIUM mmol/L 137 133*   POTASSIUM mmol/L 3.5 3.7   CHLORIDE mmol/L 95* 91*   CO2 mmol/L 30.5* 29.0   BUN mg/dL 66* 71*   CREATININE mg/dL 2.85* 3.32*   GLUCOSE mg/dL 111* 175*   ALBUMIN g/dL  --  4.1   BILIRUBIN mg/dL  --  0.5   ALK PHOS U/L  --  115   AST (SGOT) U/L  --  8   ALT (SGPT) U/L  --  16   LIPASE U/L  --  43     ABG      CT Abdomen Pelvis Without Contrast    Result Date: 11/25/2024  The patient has a colonic obstruction, with transition point noted within a left inguinal hernia sac. No pneumatosis or free air is seen at this time, although there is a small amount of free fluid which is noted within the abdomen.  Radiation dose reduction techniques were utilized, including automated exposure control and exposure modulation based on body size.   This report was finalized on 11/25/2024 11:35 PM by Dr. Inocencia Cruz M.D on Workstation: [id number]       Results for orders placed during the hospital encounter of 10/03/24    Adult Transthoracic Echo Complete W/ Cont if Necessary Per Protocol    Interpretation Summary    Left ventricular ejection fraction appears to be 61 - 65%.    Left ventricular diastolic function was indeterminate.    The left atrial cavity is moderately dilated.    Left atrial volume is moderately increased.    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    Estimated right ventricular systolic pressure from tricuspid regurgitation is moderately elevated (45-55 mmHg).        Assessment & Plan   Assessment and Plan:         Incarcerated inguinal hernia    Essential hypertension    Type 2 diabetes mellitus with circulatory disorder, without long-term current use of insulin    Hyperlipidemia    H/O aortic valve replacement with porcine valve    S/P CABG x 2    CKD (chronic kidney disease) stage 3, GFR 30-59 ml/min    KILLIAN (acute kidney injury)     Atrial fibrillation, persistent    ASSESSMENT:  KILLIAN   CKD III, with baseline creatinine ~ 2.0, secondary to diabetic and hypertensive nephropathy, creatinine 1.97 at last office appointment 07/29/2024. Prior serology negative, hepatitis profile negative, SPEP with immunofixation negative for monoclonal protein, serum free light chain ratio 1.5, serum complement C3-C4 normal, ZOIE negative, c-ANCA and p-ANCA negative. Prior urinalysis from 11/19/2024 with trace protein, UA from 11/26 with 30mg protein, large blood, 11-20 RBC. Urine osmolality 334, urine sodium 39. Prior renal US from 11/8/24 with right kidney 10.7cm, left kidney 11.9 cm, no right sided hydronephrosis, the left kidney is significantly obscured by overlying shadowing and unfortunately left-sided renal pathology cannot be excluded.   abdominal pain, reported constipation. CT AP WO with colonic obstruction, with transition point noted within a left inguinal hernia sac. No pneumatosis or free air is seen at this time, although there is a small amount of free fluid which is noted within the abdomen  Afib, on Eliquis  CAD s/p CABG  Anemia  AVR  HTN  HLD  DM    Prior echo showed EF of 61 to 65%, intermediate LV diastolic function.  Left atrial cavity and moderately dilated.       PLAN :     Renal function, improving with volume resuscitation, sCr on admit 3.3 today 2.8mg/dL.  Volume fair, continue IVFs, monitor for s/sx of volume overload.   Bp stable, monitor trends.  Electrolytes stable   Mild alkalosis, monitor  Anemia, will check iron studies, transfuse for hgb <7.0  General surgery following for colonic obstruction, with transition point noted within a left inguinal hernia sac  Daily weights, Strict I&O's  Avoid nephrotoxic agents including NSAIDs  We will follow and coordinate with team   Thank you for this consultation!      ZEINA Cedeno  [facility]  11/26/2024  12:31 EST    I personally have examined the patient and  interviewed the patient. I have reviewed the history, data, problems, assessment and plan with the nurse practitioner during rounds and I concur with the history, exam, assessment and plan as described in the progress note with comments additions, revisions as noted.  Note was transcribed by  ZEINA.          Les Talavera MD  11/26/2024  [facility]

## 2024-11-26 NOTE — PLAN OF CARE
Goal Outcome Evaluation:  Plan of Care Reviewed With: patient        Progress: improving  Outcome Evaluation: Pt is an 78 y/o M admitted to [facility] 11/25 with constipation x1 week. Pt with incarcerated inguinal hernia, SBO, plan for OR Thursday 11/28. PMH of hypertension, type 2 diabetes, A-fib, hyperlipidemia, history of aortic valve replacement, diastolic heart failure, and CKD. Pt verb'd he was recently at [facility] due to Afib and was DC'd home and able to manage but generally weaker and harder to get around. Pt presenting w/ MIN/CGA for mvmts w/ walker in room, needed assistance w/ ADLS today, and was left up seated sinkside for finishing grooming tasks, and returning to room and observed back up to chair w/ nursing staff and CGA. Pt would benefit from rehab at [facility] due to impairments w/ ADl (I)/function, gen'd weakness, decreased home ADL/IADL (I).    Anticipated Discharge Disposition (OT): skilled nursing facility

## 2024-11-26 NOTE — THERAPY EVALUATION
Patient Name: Rock Maciel Jr.  : 1945    MRN: [id number]                              Today's Date: 2024       Admit Date: 2024    Visit Dx:     ICD-10-CM ICD-9-CM   1. Incarcerated inguinal hernia  K40.30 550.10   2. SBO (small bowel obstruction)  K56.609 560.9   3. KILLIAN (acute kidney injury)  N17.9 584.9     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    Chronic venous insufficiency    Nonrheumatic aortic valve stenosis    Carotid stenosis, asymptomatic    Bradycardia    Hyperlipidemia    Bilateral carotid artery disease    Abnormal cardiovascular stress test    H/O aortic valve replacement with porcine valve    Charcot-Davida-Tooth disease-like deformity of foot    Amputated toe of right foot    Fall    Non-traumatic rhabdomyolysis    S/P CABG x 2    CKD (chronic kidney disease) stage 3, GFR 30-59 ml/min    Rupture of left quadriceps tendon    Constipation    KILLIAN (acute kidney injury)    Wound of right leg    Anemia    Chronic diastolic (congestive) heart failure    Amputated toe of left foot    Gas gangrene    Cellulitis of left lower limb    Acquired absence of left foot    Bilateral lower extremity edema    CKD (chronic kidney disease) stage 3, GFR 30-59 ml/min    History of pneumonia    Atelectasis of both lungs    Abnormal pleural fluid    Pleural thickening    Atrial fibrillation, persistent    Chronic anticoagulation    Persistent atrial fibrillation    Incarcerated inguinal hernia     Past Medical History:   Diagnosis Date    Allergic rhinitis     Bronchitis     Coronary artery disease      Diabetes mellitus 2001    DM (diabetes mellitus)     Encounter for annual health examination 05/06/2014    Annual Health Assessment    Gout     Hiatal hernia     History of MRSA infection     RIGHT FOOT 2010    Hyperlipidemia     Hypertension     Murmur     Pneumothorax on right     Sleep apnea     pt wears CPAP at night    Wellness examination 06/24/2015    Annual Wellness Visit     Past Surgical History:   Procedure Laterality Date    ARTERY SURGERY Bilateral     carotid    CARDIAC CATHETERIZATION N/A 7/20/2018    Procedure: Left Heart Cath;  Surgeon: Jo Toney MD;  [facility];  Service: Cardiovascular    CARDIAC CATHETERIZATION N/A 7/20/2018    Procedure: Coronary angiography;  Surgeon: Jo Toney MD;  Location:  [facility] CATH INVASIVE LOCATION;  Service: Cardiovascular    CAROTID ENDARTERECTOMY Bilateral     COLONOSCOPY  2008    CORONARY ARTERY BYPASS GRAFT WITH AORTIC VALVE REPAIR/REPLACEMENT N/A 7/23/2018    Procedure: INTRAOPERATIVE ALEX, MIDLINE STERNOTOMY, CORONARY ARTERY BYPASS GRAFTING X 3 USING ENDOSCOPICALLY HARVESTED LEFT GREATER SAPHENOUS VEIN,  AORTIC VALVE REPLACEMENT USING 25MM LOPEZ II ULTRA PORCINE VALVE, PRP;  Surgeon: Phong Posey MD;  Location: [facility] OR;  Service: Cardiothoracic    FOOT SURGERY Right 2010    5th digit removal    FOOT SURGERY Left 2011    1 digit removed    INCISION AND DRAINAGE LEG Right 3/28/2020    Procedure: DEBRIDMENT OF RIGHT CALF;  Surgeon: Ross Pineda MD;  Location: [facility] OR;  Service: Vascular;  Laterality: Right;    QUADRICEPS TENDON REPAIR Left 5/14/2019    Procedure: Left QUADRICEPS TENDON REPAIR;  Surgeon: Camilo Hunter MD;  Location: [facility] OR;  Service: Orthopedics    THORACENTESIS Right 11/21/2016    THORACOSCOPY Right 5/8/2017    Procedure: BRONCHOSCOPY, RIGHT VAT,  TOTAL DECORTICATION RIGHT LUNG, PLEURAL BX, PLACEMENT SUBPLEURAL PAIN CAATHETERS X2;  Surgeon: Donald RAGLAND  Johanny ROBERT MD;  Location: [facility] MAIN OR;  Service:     TONSILECTOMY, ADENOIDECTOMY, BILATERAL MYRINGOTOMY AND TUBES        General Information       Row Name 11/26/24 1539          Physical Therapy Time and Intention    Document Type evaluation  -EF     Mode of Treatment individual therapy;physical therapy  -EF       Row Name 11/26/24 1539          General Information    Patient Profile Reviewed yes  -EF     Prior Level of Function independent:;all household mobility  rwx  -EF     Existing Precautions/Restrictions fall  -EF     Barriers to Rehab none identified  -EF       Row Name 11/26/24 1539          Living Environment    People in Home alone  -EF       Row Name 11/26/24 1539          Home Main Entrance    Number of Stairs, Main Entrance two  -EF     Stair Railings, Main Entrance railings safe and in good condition  -EF       Row Name 11/26/24 1539          Stairs Within Home, Primary    Stairs, Within Home, Primary stair lift inside home  -EF     Number of Stairs, Within Home, Primary twelve  -EF       Row Name 11/26/24 1537          Cognition    Orientation Status (Cognition) oriented x 4  -EF       Row Name 11/26/24 1537          Safety Issues/Impairments Affecting Functional Mobility    Impairments Affecting Function (Mobility) endurance/activity tolerance;balance;strength  -EF               User Key  (r) = Recorded By, (t) = Taken By, (c) = Cosigned By      Initials Name Provider Type    EF Madelin Bermeo, PT Physical Therapist                   Mobility       Row Name 11/26/24 1545          Bed Mobility    Comment, (Bed Mobility) not tested; pt up in chair  -EF       Row Name 11/26/24 154          Sit-Stand Transfer    Sit-Stand [place] (Transfers) contact guard;verbal cues  -EF     Assistive Device (Sit-Stand Transfers) walker, front-wheeled  -EF     Comment, (Sit-Stand Transfer) cues for hand placement  -EF       Row Name 11/26/24 1546          Gait/Stairs (Locomotion)    [place] Level  (Gait) contact guard;verbal cues  -EF     Assistive Device (Gait) walker, front-wheeled  -EF     Distance in Feet (Gait) 150  -EF     Deviations/Abnormal Patterns (Gait) crispin decreased  -EF     Bilateral Gait Deviations forward flexed posture;heel strike decreased  -EF               User Key  (r) = Recorded By, (t) = Taken By, (c) = Cosigned By      Initials Name Provider Type    EF Madelin Bermeo PT Physical Therapist                   Obj/Interventions       Row Name 11/26/24 1541          Range of Motion Comprehensive    General Range of Motion bilateral lower extremity ROM WFL  -EF       Row Name 11/26/24 1541          Strength Comprehensive (MMT)    General Manual Muscle Testing (MMT) Assessment lower extremity strength deficits identified  -EF     Comment, General Manual Muscle Testing (MMT) Assessment B LEs grossly 4-/5  -EF       Row Name 11/26/24 1541          Balance    Balance Assessment standing static balance;standing dynamic balance  -EF     Static Standing Balance standby assist;contact guard  -EF     Dynamic Standing Balance contact guard  -EF     Position/Device Used, Standing Balance supported;walker, front-wheeled  -EF       Row Name 11/26/24 1541          Sensory Assessment (Somatosensory)    Sensory Assessment (Somatosensory) LE sensation intact  -EF               User Key  (r) = Recorded By, (t) = Taken By, (c) = Cosigned By      Initials Name Provider Type    EF Madelin Bermeo PT Physical Therapist                   Goals/Plan       Row Name 11/26/24 154          Bed Mobility Goal 1 (PT)    Activity/Assistive Device (Bed Mobility Goal 1, PT) sit to supine/supine to sit  -EF     [place] Level/Cues Needed (Bed Mobility Goal 1, PT) modified independence  -EF     Time Frame (Bed Mobility Goal 1, PT) 1 week;long term goal (LTG)  -EF     Progress/Outcomes (Bed Mobility Goal 1, PT) goal ongoing  -EF       Row Name 11/26/24 1547          Transfer Goal 1 (PT)    Activity/Assistive  "Device (Transfer Goal 1, PT) sit-to-stand/stand-to-sit;walker, rolling  -EF     [place] Level/Cues Needed (Transfer Goal 1, PT) standby assist  -EF     Time Frame (Transfer Goal 1, PT) 1 week;long term goal (LTG)  -EF     Progress/Outcome (Transfer Goal 1, PT) goal ongoing  -EF       Row Name 11/26/24 1543          Gait Training Goal 1 (PT)    Activity/Assistive Device (Gait Training Goal 1, PT) gait (walking locomotion);walker, rolling  -EF     [place] Level (Gait Training Goal 1, PT) standby assist  -EF     Distance (Gait Training Goal 1, PT) 200'  -EF     Time Frame (Gait Training Goal 1, PT) 1 week;long term goal (LTG)  -EF     Progress/Outcome (Gait Training Goal 1, PT) goal ongoing  -EF       Row Name 11/26/24 1546          Therapy Assessment/Plan (PT)    Planned Therapy Interventions (PT) balance training;bed mobility training;gait training;home exercise program;patient/family education;ROM (range of motion);stair training;strengthening;transfer training;postural re-education  -EF               User Key  (r) = Recorded By, (t) = Taken By, (c) = Cosigned By      Initials Name Provider Type    EF Madelin Bermeo, PT Physical Therapist                   Clinical Impression       Row Name 11/26/24 1549          Pain    Pretreatment Pain Rating 0/10 - no pain  -EF     Posttreatment Pain Rating 0/10 - no pain  -EF       Row Name 11/26/24 1548          Plan of Care Review    Plan of Care Reviewed With patient  -EF     Outcome Evaluation Pt is an 80 yo male who presents from home with constipation, SBO with incarcerated inguinal hernia. Prior to admission, pt was living at home alone and independent with mobility using rwx. Pt demos generalized weakness but states he \"feels better than he did last time he was here\". Pt up in chair upon PT arrival and performed STS transfer with CGA and rwx. Pt ambulated 150' with CGA and rwx. Pt will continue to benefit from PT to address impairments and increase " independence with mobility. Rec HH PT at [facility].  -EF       Row Name 11/26/24 1541          Therapy Assessment/Plan (PT)    Rehab Potential (PT) good  -EF     Criteria for Skilled Interventions Met (PT) yes;meets criteria;skilled treatment is necessary  -EF     Therapy Frequency (PT) 5 times/wk  -EF       Row Name 11/26/24 1541          Positioning and Restraints    Pre-Treatment Position sitting in chair/recliner  -EF     Post Treatment Position chair  -EF     In Chair reclined;call light within reach;encouraged to call for assist;exit alarm on;legs elevated  -EF               User Key  (r) = Recorded By, (t) = Taken By, (c) = Cosigned By      Initials Name Provider Type    EF Madelin Bermeo, PT Physical Therapist                   Outcome Measures       Row Name 11/26/24 1544 11/26/24 1200       How much help from another person do you currently need...    Turning from your back to your side while in flat bed without using bedrails? 3  -EF 3  -CC    Moving from lying on back to sitting on the side of a flat bed without bedrails? 3  -EF 3  -CC    Moving to and from a bed to a chair (including a wheelchair)? 3  -EF 3  -CC    Standing up from a chair using your arms (e.g., wheelchair, bedside chair)? 3  -EF 3  -CC    Climbing 3-5 steps with a railing? 3  -EF 3  -CC    To walk in hospital room? 3  -EF 3  -CC    AM-PAC 6 Clicks Score (PT) 18  -EF 18  -CC      Row Name 11/26/24 0915 11/26/24 0408       How much help from another person do you currently need...    Turning from your back to your side while in flat bed without using bedrails? 3  -JS 3  -HC    Moving from lying on back to sitting on the side of a flat bed without bedrails? 3  -JS 3  -HC    Moving to and from a bed to a chair (including a wheelchair)? 3  -JS 3  -HC    Standing up from a chair using your arms (e.g., wheelchair, bedside chair)? 3  -JS 3  -HC    Climbing 3-5 steps with a railing? 3  -JS 2  -HC    To walk in hospital room? 3  -JS 2  -HC     AM-PAC 6 Clicks Score (PT) 18  -JS 16  -HC      Row Name 11/26/24 0358          How much help from another person do you currently need...    Turning from your back to your side while in flat bed without using bedrails? 3  -HC     Moving from lying on back to sitting on the side of a flat bed without bedrails? 3  -HC     Moving to and from a bed to a chair (including a wheelchair)? 3  -HC     Standing up from a chair using your arms (e.g., wheelchair, bedside chair)? 3  -HC     Climbing 3-5 steps with a railing? 1  -HC     To walk in hospital room? 3  -HC     AM-PAC 6 Clicks Score (PT) 16  -HC       Row Name 11/26/24 1133          Functional Assessment    Outcome Measure Options AM-PAC 6 Clicks Daily Activity (OT)  -BC               User Key  (r) = Recorded By, (t) = Taken By, (c) = Cosigned By      Initials Name Provider Type     Madelin Bermeo, PT Physical Therapist    Yaneth Blackman, RN Registered Nurse    Thea Brown, RN Registered Nurse    Justin Branham, RN Registered Nurse    Kandy Ibanez, OT Occupational Therapist                                 Physical Therapy Education       Title: PT OT SLP Therapies (In Progress)       Topic: Physical Therapy (In Progress)       Point: Mobility training (Done)       Learning Progress Summary            Patient Acceptance, E, VU,NR by  at 11/26/2024 8454                      Point: Home exercise program (Not Started)       Learner Progress:  Not documented in this visit.              Point: Body mechanics (Not Started)       Learner Progress:  Not documented in this visit.              Point: Precautions (Not Started)       Learner Progress:  Not documented in this visit.                              User Key       Initials Effective Dates Name Provider Type Discipline     05/31/24 -  Madelin Bermeo, PT Physical Therapist PT                  PT Recommendation and Plan  Planned Therapy Interventions (PT): balance training, bed mobility  "training, gait training, home exercise program, patient/family education, ROM (range of motion), stair training, strengthening, transfer training, postural re-education  Outcome Evaluation: Pt is an 78 yo male who presents from home with constipation, SBO with incarcerated inguinal hernia. Prior to admission, pt was living at home alone and independent with mobility using rwx. Pt demos generalized weakness but states he \"feels better than he did last time he was here\". Pt up in chair upon PT arrival and performed STS transfer with CGA and rwx. Pt ambulated 150' with CGA and rwx. Pt will continue to benefit from PT to address impairments and increase independence with mobility. Rec HH PT at [place].     Time Calculation:         PT Charges       Row Name 11/26/24 1545             Time Calculation    Start Time 1428  -EF      Stop Time 1445  -EF      Time Calculation (min) 17 min  -EF      PT Received On 11/26/24  -EF      PT - Next Appointment 11/27/24  -EF      PT Goal Re-Cert Due Date 12/03/24  -EF         Time Calculation- PT    Total Timed Code Minutes- PT 9 minute(s)  -EF         Timed Charges    89085 - PT Therapeutic Activity Minutes 9  -EF         Total Minutes    Timed Charges Total Minutes 9  -EF       Total Minutes 9  -EF                User Key  (r) = Recorded By, (t) = Taken By, (c) = Cosigned By      Initials Name Provider Type    EF Madelin Bermeo, PT Physical Therapist                  Therapy Charges for Today       Code Description Service Date Service Provider Modifiers Qty    [id number]  PT THERAPEUTIC ACT EA 15 MIN 11/26/2024 Madelin Bermeo, PT GP 1    [id number]  PT EVAL MOD COMPLEXITY 2 11/26/2024 Madelin Bermeo, PT GP 1            PT G-Codes  Outcome Measure Options: AM-PAC 6 Clicks Daily Activity (OT)  AM-PAC 6 Clicks Score (PT): 18  AM-PAC 6 Clicks Score (OT): 15  PT Discharge Summary  Anticipated Discharge Disposition (PT): home with home health    Madelin Bermeo" PT  11/26/2024

## 2024-11-26 NOTE — ED NOTES
"Nursing report ED to floor  Rock Maciel Jr.  79 y.o.  male    HPI :  HPI  Stated Reason for Visit: pt states, \"I got out of the hospital at 11/15. I was given a suppository and I had a bowel movement that day. I have not had one since that day. I have a lot of gas and my stomach hurts.\"  History Obtained From: patient    Chief Complaint  Chief Complaint   Patient presents with    Constipation       Admitting doctor:   Pascual Flores MD    Admitting diagnosis:   The primary encounter diagnosis was Incarcerated inguinal hernia. Diagnoses of SBO (small bowel obstruction) and KILLIAN (acute kidney injury) were also pertinent to this visit.    Code status:   Current Code Status       Date Active Code Status Order ID Comments User Context       11/26/2024 0157 CPR (Attempt to Resuscitate) [id number]  Sharita Roche APRN ED        Question Answer    Code Status (Patient has no pulse and is not breathing) CPR (Attempt to Resuscitate)    Medical Interventions (Patient has pulse or is breathing) Full Support                    Allergies:   Ceclor [cefaclor]    Isolation:   No active isolations    Intake and Output  No intake or output data in the 24 hours ending 11/26/24 0201    Weight:       11/25/24 2003   Weight: 119 kg (262 lb)       Most recent vitals:   Vitals:    11/25/24 2003 11/25/24 2009 11/26/24 0009 11/26/24 0102   BP:  162/71 150/62 127/52   BP Location:  Right arm     Patient Position:  Sitting     Pulse: 86  83 84   Resp: 18      Temp: 97.9 °F (36.6 °C)      TempSrc: Tympanic      SpO2: 95%  93% 93%   Weight: 119 kg (262 lb)      Height: 185.4 cm (73\")          Active LDAs/IV Access:   Lines, Drains & Airways       Active LDAs       Name Placement date Placement time Site Days    Peripheral IV 11/25/24 2231 Right Antecubital 11/25/24 2231  Antecubital  less than 1                    Labs (abnormal labs have a star):   Labs Reviewed   COMPREHENSIVE METABOLIC PANEL - Abnormal; Notable for the " following components:       Result Value    Glucose 175 (*)     BUN 71 (*)     Creatinine 3.32 (*)     Sodium 133 (*)     Chloride 91 (*)     eGFR 18.0 (*)     All other components within normal limits    Narrative:     GFR Normal >60  Chronic Kidney Disease <60  Kidney Failure <15    The GFR formula is only valid for adults with stable renal function between ages 18 and 70.   CBC WITH AUTO DIFFERENTIAL - Abnormal; Notable for the following components:    WBC 11.29 (*)     RBC 3.70 (*)     Hemoglobin 10.8 (*)     Hematocrit 33.5 (*)     Neutrophil % 86.3 (*)     Lymphocyte % 6.3 (*)     Neutrophils, Absolute 9.74 (*)     Immature Grans, Absolute 0.06 (*)     All other components within normal limits   LIPASE - Normal   URINALYSIS W/ MICROSCOPIC IF INDICATED (NO CULTURE)   BASIC METABOLIC PANEL   CBC (NO DIFF)   POCT GLUCOSE FINGERSTICK   POCT GLUCOSE FINGERSTICK   POCT GLUCOSE FINGERSTICK   POCT GLUCOSE FINGERSTICK   CBC AND DIFFERENTIAL    Narrative:     The following orders were created for panel order CBC & Differential.  Procedure                               Abnormality         Status                     ---------                               -----------         ------                     CBC Auto Differential[[id number]]        Abnormal            Final result                 Please view results for these tests on the individual orders.       EKG:   No orders to display       Meds given in ED:   Medications   sodium chloride 0.9 % flush 10 mL (has no administration in time range)   sodium chloride 0.9 % flush 10 mL (has no administration in time range)   sodium chloride 0.9 % infusion 40 mL (has no administration in time range)   acetaminophen (TYLENOL) tablet 650 mg (has no administration in time range)     Or   acetaminophen (TYLENOL) 160 MG/5ML oral solution 650 mg (has no administration in time range)     Or   acetaminophen (TYLENOL) suppository 650 mg (has no administration in time range)   ondansetron  ODT (ZOFRAN-ODT) disintegrating tablet 4 mg (has no administration in time range)     Or   ondansetron (ZOFRAN) injection 4 mg (has no administration in time range)   calcium carbonate (TUMS) chewable tablet 500 mg (200 mg elemental) (has no administration in time range)   sennosides-docusate (PERICOLACE) 8.6-50 MG per tablet 2 tablet (has no administration in time range)     And   polyethylene glycol (MIRALAX) packet 17 g (has no administration in time range)     And   bisacodyl (DULCOLAX) EC tablet 5 mg (has no administration in time range)     And   bisacodyl (DULCOLAX) suppository 10 mg (has no administration in time range)   dextrose (GLUTOSE) oral gel 15 g (has no administration in time range)   dextrose (D50W) (25 g/50 mL) IV injection 25 g (has no administration in time range)   glucagon (GLUCAGEN) injection 1 mg (has no administration in time range)   insulin lispro (HUMALOG/ADMELOG) injection 2-7 Units (has no administration in time range)   lactated ringers bolus 500 mL ( Intravenous Currently Infusing 11/26/24 0049)   midazolam (VERSED) injection 2 mg (2 mg Intravenous Given 11/26/24 0055)   polyethylene glycol (MIRALAX) packet 17 g (17 g Oral Given 11/26/24 0128)       Imaging results:  CT Abdomen Pelvis Without Contrast    Result Date: 11/25/2024  The patient has a colonic obstruction, with transition point noted within a left inguinal hernia sac. No pneumatosis or free air is seen at this time, although there is a small amount of free fluid which is noted within the abdomen.  Radiation dose reduction techniques were utilized, including automated exposure control and exposure modulation based on body size.   This report was finalized on 11/25/2024 11:35 PM by Dr. Inocencia Cruz M.D on Workstation: [id number]       Ambulatory status:   - assist    Social issues:   Social History     Socioeconomic History    Marital status:     Number of children: 1   Tobacco Use    Smoking status: Never      Passive exposure: Never    Smokeless tobacco: Never   Vaping Use    Vaping status: Never Used   Substance and Sexual Activity    Alcohol use: Yes     Comment: either one glass wine or one glass liquor per  day    Drug use: Never    Sexual activity: Defer       Peripheral Neurovascular  Peripheral Neurovascular (Adult)  Peripheral Neurovascular WDL: WDL    Neuro Cognitive  Neuro Cognitive (Adult)  Cognitive/Neuro/Behavioral WDL: WDL    Learning  Learning Assessment  Learning Readiness and Ability: no barriers identified  Education Provided  Person Taught: patient    Respiratory  Respiratory WDL  Respiratory WDL: WDL    Abdominal Pain       Pain Assessments  Pain (Adult)  (0-10) Pain Rating: Rest: 5  Pain Location: abdomen  Pain Side/Orientation: lower    NIH Stroke Scale       Gertrudis Ramey RN  11/26/24 02:01 EST

## 2024-11-27 ENCOUNTER — TELEPHONE (OUTPATIENT)
Dept: FAMILY MEDICINE CLINIC | Facility: CLINIC | Age: 79
End: 2024-11-27
Payer: MEDICARE

## 2024-11-27 VITALS
SYSTOLIC BLOOD PRESSURE: 132 MMHG | HEART RATE: 68 BPM | BODY MASS INDEX: 34.72 KG/M2 | HEIGHT: 73 IN | OXYGEN SATURATION: 97 % | WEIGHT: 262 LBS | DIASTOLIC BLOOD PRESSURE: 54 MMHG

## 2024-11-27 LAB
ANION GAP SERPL CALCULATED.3IONS-SCNC: 8 MMOL/L (ref 5–15)
BASOPHILS # BLD AUTO: 0.02 10*3/MM3 (ref 0–0.2)
BASOPHILS NFR BLD AUTO: 0.4 % (ref 0–1.5)
BUN SERPL-MCNC: 52 MG/DL (ref 8–23)
BUN/CREAT SERPL: 23.2 (ref 7–25)
CALCIUM SPEC-SCNC: 8.4 MG/DL (ref 8.6–10.5)
CHLORIDE SERPL-SCNC: 101 MMOL/L (ref 98–107)
CO2 SERPL-SCNC: 29 MMOL/L (ref 22–29)
CREAT SERPL-MCNC: 2.24 MG/DL (ref 0.76–1.27)
DEPRECATED RDW RBC AUTO: 45.2 FL (ref 37–54)
EGFRCR SERPLBLD CKD-EPI 2021: 28.9 ML/MIN/1.73
EOSINOPHIL # BLD AUTO: 0.16 10*3/MM3 (ref 0–0.4)
EOSINOPHIL NFR BLD AUTO: 3 % (ref 0.3–6.2)
ERYTHROCYTE [DISTWIDTH] IN BLOOD BY AUTOMATED COUNT: 13.9 % (ref 12.3–15.4)
GLUCOSE BLDC GLUCOMTR-MCNC: 101 MG/DL (ref 70–130)
GLUCOSE BLDC GLUCOMTR-MCNC: 132 MG/DL (ref 70–130)
GLUCOSE BLDC GLUCOMTR-MCNC: 152 MG/DL (ref 70–130)
GLUCOSE BLDC GLUCOMTR-MCNC: 196 MG/DL (ref 70–130)
GLUCOSE SERPL-MCNC: 87 MG/DL (ref 65–99)
HCT VFR BLD AUTO: 28.6 % (ref 37.5–51)
HGB BLD-MCNC: 9.2 G/DL (ref 13–17.7)
IMM GRANULOCYTES # BLD AUTO: 0.02 10*3/MM3 (ref 0–0.05)
IMM GRANULOCYTES NFR BLD AUTO: 0.4 % (ref 0–0.5)
LYMPHOCYTES # BLD AUTO: 0.8 10*3/MM3 (ref 0.7–3.1)
LYMPHOCYTES NFR BLD AUTO: 14.9 % (ref 19.6–45.3)
MCH RBC QN AUTO: 29.2 PG (ref 26.6–33)
MCHC RBC AUTO-ENTMCNC: 32.2 G/DL (ref 31.5–35.7)
MCV RBC AUTO: 90.8 FL (ref 79–97)
MONOCYTES # BLD AUTO: 0.43 10*3/MM3 (ref 0.1–0.9)
MONOCYTES NFR BLD AUTO: 8 % (ref 5–12)
NEUTROPHILS NFR BLD AUTO: 3.95 10*3/MM3 (ref 1.7–7)
NEUTROPHILS NFR BLD AUTO: 73.3 % (ref 42.7–76)
NRBC BLD AUTO-RTO: 0 /100 WBC (ref 0–0.2)
PLATELET # BLD AUTO: 262 10*3/MM3 (ref 140–450)
PMV BLD AUTO: 10.3 FL (ref 6–12)
POTASSIUM SERPL-SCNC: 3.7 MMOL/L (ref 3.5–5.2)
RBC # BLD AUTO: 3.15 10*6/MM3 (ref 4.14–5.8)
SODIUM SERPL-SCNC: 138 MMOL/L (ref 136–145)
WBC NRBC COR # BLD AUTO: 5.38 10*3/MM3 (ref 3.4–10.8)

## 2024-11-27 PROCEDURE — 63710000001 INSULIN LISPRO (HUMAN) PER 5 UNITS: Performed by: NURSE PRACTITIONER

## 2024-11-27 PROCEDURE — 80048 BASIC METABOLIC PNL TOTAL CA: CPT | Performed by: NURSE PRACTITIONER

## 2024-11-27 PROCEDURE — 85025 COMPLETE CBC W/AUTO DIFF WBC: CPT | Performed by: NURSE PRACTITIONER

## 2024-11-27 PROCEDURE — 97530 THERAPEUTIC ACTIVITIES: CPT

## 2024-11-27 PROCEDURE — 99232 SBSQ HOSP IP/OBS MODERATE 35: CPT | Performed by: SURGERY

## 2024-11-27 PROCEDURE — 82948 REAGENT STRIP/BLOOD GLUCOSE: CPT

## 2024-11-27 RX ADMIN — Medication 10 ML: at 20:41

## 2024-11-27 RX ADMIN — INSULIN LISPRO 2 UNITS: 100 INJECTION, SOLUTION INTRAVENOUS; SUBCUTANEOUS at 17:36

## 2024-11-27 RX ADMIN — OXYCODONE HYDROCHLORIDE AND ACETAMINOPHEN 250 MG: 500 TABLET ORAL at 08:51

## 2024-11-27 RX ADMIN — LINAGLIPTIN 5 MG: 5 TABLET, FILM COATED ORAL at 08:51

## 2024-11-27 RX ADMIN — Medication 10 ML: at 08:51

## 2024-11-27 RX ADMIN — AMIODARONE HYDROCHLORIDE 200 MG: 200 TABLET ORAL at 08:51

## 2024-11-27 RX ADMIN — Medication 1 APPLICATION: at 20:45

## 2024-11-27 RX ADMIN — METOPROLOL SUCCINATE 25 MG: 25 TABLET, EXTENDED RELEASE ORAL at 13:34

## 2024-11-27 RX ADMIN — ATORVASTATIN CALCIUM 20 MG: 20 TABLET, FILM COATED ORAL at 20:41

## 2024-11-27 RX ADMIN — Medication 1 APPLICATION: at 08:51

## 2024-11-27 RX ADMIN — TERAZOSIN 1 MG: 1 CAPSULE ORAL at 20:41

## 2024-11-27 RX ADMIN — Medication 1000 UNITS: at 08:51

## 2024-11-27 NOTE — PLAN OF CARE
Problem: Adult Inpatient Plan of Care  Goal: Plan of Care Review  Outcome: Progressing  Flowsheets (Taken 11/27/2024 1526)  Progress: improving  Outcome Evaluation: Patient is alert and oriented x4, VSS on room air. Patient has been up to the chair this shift and worked with PT. Patient also had a bowel movement this shift. No complaints of pain thus far. Plans for possible surgery tomorrow, 11/28, or Friday 11/29. Patient is aware of the plan and consent is signed and in the chart. Bed/chair alarm on, bed in lowest position, and call light within reach.  Plan of Care Reviewed With: patient  Goal: Patient-Specific Goal (Individualized)  Outcome: Progressing  Flowsheets (Taken 11/27/2024 1526)  Patient/Family-Specific Goals (Include Timeframe): Patient will remain free of pain throughout the shift.  Individualized Care Needs: Patient would like to have a bowel movement.  Anxieties, Fears or Concerns: None  Goal: Absence of Hospital-Acquired Illness or Injury  Outcome: Progressing  Intervention: Identify and Manage Fall Risk  Recent Flowsheet Documentation  Taken 11/27/2024 1430 by Georgie Tejada RN  Safety Promotion/Fall Prevention:   activity supervised   assistive device/personal items within reach   clutter free environment maintained   fall prevention program maintained   lighting adjusted   nonskid shoes/slippers when out of bed   room organization consistent   safety round/check completed   toileting scheduled  Taken 11/27/2024 1250 by Georgie Tejada, RN  Safety Promotion/Fall Prevention:   activity supervised   assistive device/personal items within reach   clutter free environment maintained   fall prevention program maintained   lighting adjusted   nonskid shoes/slippers when out of bed   room organization consistent   safety round/check completed   toileting scheduled  Taken 11/27/2024 1015 by Georgie Tejada, RN  Safety Promotion/Fall Prevention:   activity supervised   assistive device/personal items  within reach   clutter free environment maintained   fall prevention program maintained   lighting adjusted   nonskid shoes/slippers when out of bed   room organization consistent   safety round/check completed   toileting scheduled  Taken 11/27/2024 0848 by Georgie Tejada RN  Safety Promotion/Fall Prevention:   activity supervised   assistive device/personal items within reach   clutter free environment maintained   fall prevention program maintained   lighting adjusted   nonskid shoes/slippers when out of bed   room organization consistent   safety round/check completed   toileting scheduled  Intervention: Prevent Skin Injury  Recent Flowsheet Documentation  Taken 11/27/2024 1430 by Georgie Tejada RN  Body Position: (up in chair)   position changed independently   weight shifting  Taken 11/27/2024 1250 by Georgie Tejada RN  Body Position: (up in chair)   position changed independently   weight shifting   legs elevated  Taken 11/27/2024 1015 by Georgie Tejada RN  Body Position:   position changed independently   weight shifting   sitting up in bed  Taken 11/27/2024 0848 by Georgie Tejada RN  Body Position:   position changed independently   weight shifting   supine  Skin Protection:   silicone foam dressing in place   incontinence pads utilized   transparent dressing maintained  Intervention: Prevent and Manage VTE (Venous Thromboembolism) Risk  Recent Flowsheet Documentation  Taken 11/27/2024 0848 by Georgie Tejada RN  VTE Prevention/Management:   bilateral   SCDs (sequential compression devices) off   patient refused intervention  Intervention: Prevent Infection  Recent Flowsheet Documentation  Taken 11/27/2024 1430 by Georgie Tejada RN  Infection Prevention:   environmental surveillance performed   equipment surfaces disinfected   hand hygiene promoted   personal protective equipment utilized   rest/sleep promoted   single patient room provided  Taken 11/27/2024 1250 by Georgie Tejada RN  Infection Prevention:    environmental surveillance performed   equipment surfaces disinfected   hand hygiene promoted   personal protective equipment utilized   rest/sleep promoted   single patient room provided  Taken 11/27/2024 1015 by Georgie Tejada RN  Infection Prevention:   environmental surveillance performed   hand hygiene promoted   equipment surfaces disinfected   personal protective equipment utilized   rest/sleep promoted   single patient room provided  Taken 11/27/2024 0848 by Georgie Tejada RN  Infection Prevention:   environmental surveillance performed   equipment surfaces disinfected   hand hygiene promoted   personal protective equipment utilized   rest/sleep promoted   single patient room provided  Goal: Optimal Comfort and Wellbeing  Outcome: Progressing  Intervention: Provide Person-Centered Care  Recent Flowsheet Documentation  Taken 11/27/2024 1430 by Georgie Teajda RN  Trust Relationship/Rapport:   care explained   choices provided   emotional support provided   empathic listening provided   questions answered   questions encouraged   reassurance provided   thoughts/feelings acknowledged  Taken 11/27/2024 0848 by Georgie Tejada RN  Trust Relationship/Rapport:   care explained   choices provided   emotional support provided   empathic listening provided   questions answered   questions encouraged   reassurance provided   thoughts/feelings acknowledged  Goal: Readiness for Transition of Care  Outcome: Progressing     Problem: Comorbidity Management  Goal: Maintenance of Heart Failure Symptom Control  Outcome: Progressing  Intervention: Maintain Heart Failure Management  Recent Flowsheet Documentation  Taken 11/27/2024 0848 by Georgie Tejada RN  Medication Review/Management: medications reviewed     Problem: Fall Injury Risk  Goal: Absence of Fall and Fall-Related Injury  Outcome: Progressing  Intervention: Identify and Manage Contributors  Recent Flowsheet Documentation  Taken 11/27/2024 0848 by Georgie Tejada  RN  Medication Review/Management: medications reviewed  Intervention: Promote Injury-Free Environment  Recent Flowsheet Documentation  Taken 11/27/2024 1430 by Georgie Tejada RN  Safety Promotion/Fall Prevention:   activity supervised   assistive device/personal items within reach   clutter free environment maintained   fall prevention program maintained   lighting adjusted   nonskid shoes/slippers when out of bed   room organization consistent   safety round/check completed   toileting scheduled  Taken 11/27/2024 1250 by Georgie Tejada RN  Safety Promotion/Fall Prevention:   activity supervised   assistive device/personal items within reach   clutter free environment maintained   fall prevention program maintained   lighting adjusted   nonskid shoes/slippers when out of bed   room organization consistent   safety round/check completed   toileting scheduled  Taken 11/27/2024 1015 by Georgie Tejaad RN  Safety Promotion/Fall Prevention:   activity supervised   assistive device/personal items within reach   clutter free environment maintained   fall prevention program maintained   lighting adjusted   nonskid shoes/slippers when out of bed   room organization consistent   safety round/check completed   toileting scheduled  Taken 11/27/2024 0848 by Georgie Tejada RN  Safety Promotion/Fall Prevention:   activity supervised   assistive device/personal items within reach   clutter free environment maintained   fall prevention program maintained   lighting adjusted   nonskid shoes/slippers when out of bed   room organization consistent   safety round/check completed   toileting scheduled   Goal Outcome Evaluation:  Plan of Care Reviewed With: patient        Progress: improving  Outcome Evaluation: Patient is alert and oriented x4, VSS on room air. Patient has been up to the chair this shift and worked with PT. Patient also had a bowel movement this shift. No complaints of pain thus far. Plans for possible surgery tomorrow,  11/28, or Friday 11/29. Patient is aware of the plan and consent is signed and in the chart. Bed/chair alarm on, bed in lowest position, and call light within reach.

## 2024-11-27 NOTE — PROGRESS NOTES
Colorectal & General Surgery  Progress Note    Patient: Rock Maciel Jr.  YOB: 1945  MRN: [id number]      Assessment  Rock Maciel Jr. is a 79 y.o. male with large bowel obstruction secondary to incarcerated left inguinal hernia that has been successfully reduced.  He is passing flatus but no significant bowel function yet.  He is tolerating clear liquids.  Given that he is passing flatus, I do not think we need to proceed with Gastrografin enema study at this time.  I would like him to have some bowel function prior to surgery, so we will wait until at least tomorrow.  I discussed the role of laparoscopic robot-assisted left, possible bilateral, inguinal hernia repair with implantation of mesh.     Given that tomorrow is Thanksgiving, we may or may not be able to proceed with surgery tomorrow.  I also do not want to proceed with surgery until he has had a bowel movement.    I will put him on the schedule for Friday tentatively.  If he is doing well tomorrow and the OR is available on a holiday, then we may proceed tomorrow.    Okay for regular diet from my standpoint today.  N.p.o. after midnight.    Subjective  Feels well today.  Passing flatus.  Feels like he is about to have a bowel movement.  Tolerating liquids fine.  No significant pain.    Objective    Vitals:    11/27/24 0705   BP: 134/50   Pulse: 69   Resp: 18   Temp: 98.2 °F (36.8 °C)   SpO2: 99%       Physical Exam  Constitutional: Well-developed well-nourished, no acute distress  Neck: Supple, trachea midline  Respiratory: No increased work of breathing, Symmetric excursion  Cardiovascular: Well pefursed, no jugular venous distention evident   Abdominal: Reduced left inguinal hernia.  Soft, non-tender, non-distended  Skin: Warm, dry, no rash on visualized skin surfaces  Psychiatric: Alert and oriented ×3, normal affect     Laboratory Results  I have personally reviewed CBC with WC 5, hemoglobin 9, platelets 262.  BMP with creatinine  2.2, bicarb 29.    Radiology  No new imaging to review         Erick Lazo MD  Colorectal & General Surgery  [facility]    [address], [address]  [place], [place], [address]  P: [phone]  F: [phone]

## 2024-11-27 NOTE — PLAN OF CARE
Goal Outcome Evaluation:  Plan of Care Reviewed With: patient        Progress: improving  Outcome Evaluation: Pt demos good progress with strength and endurance, as evidenced by tolerance of increased activity. Pt up in chair, performed transfer with SBA and rwx. Pt ambulated 300' with rwx and CGA. Pt will continue to benefit from PT to maximize independence with mobility; encouraged pt to continue mobilizing 2-3x day with nsg staff in addition to PT sessions. Rec HH PT at [place] as pt lives alone.    Anticipated Discharge Disposition (PT): home with home health

## 2024-11-27 NOTE — TELEPHONE ENCOUNTER
Called patient to scheduled appointment for chronic care follow-up  1 month from now left voice mail hub to relay

## 2024-11-27 NOTE — DISCHARGE PLACEMENT REQUEST
"Madyson Daniels Jr. (79 y.o. Male)       Date of Birth   1945    Social Security Number       Address   [address] [place] [address]    Home Phone   [phone]    MRN   [id number]       Shinto   Denominational    Marital Status                               Admission Date   11/25/24    Admission Type   Emergency    Admitting Provider   Bishop Chakraborty MD    Attending Provider   Bishop Chakraborty MD    Department, Room/Bed   [address], [address]       Discharge Date       Discharge Disposition       Discharge Destination                                 Attending Provider: Bishop Chakraborty MD    Allergies: Ceclor [Cefaclor]    Isolation: None   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/07/24)   Code Status: CPR    Ht: 185.4 cm (73\")   Wt: 119 kg (262 lb)    Admission Cmt: None   Principal Problem: Incarcerated inguinal hernia [K40.30]                   Active Insurance as of 11/25/2024       Primary Coverage       Payor Plan Insurance Group Employer/Plan Group    ANTH MEDICARE REPLACEMENT ANTH MEDICARE ADVANTAGE [id number]       Payor Plan Address Payor Plan Phone Number Payor Plan Fax Number Effective Dates    [address] [phone]  1/1/2016 - None Entered    [facility] [address]         Subscriber Name Subscriber Birth Date Member ID       MADYSON DANIELS [id number] 1945 [id number]                     Emergency Contacts        (Rel.) Home Phone Work Phone [place] Phone    Claudette Daniels (Daughter) [phone] -- [phone]    RAJ MEJIA (Friend) [phone] -- [phone]                "

## 2024-11-27 NOTE — CASE MANAGEMENT/SOCIAL WORK
Case Management/Social Work    Patient Name:  Rock Maciel Jr.  YOB: 1945  MRN: [id number]  Admit Date:  11/25/2024        Patient requires bariatric w/c due to pt weight, w/c use inside and outside of home, he has the upper body strength to self propel w/c.     Electronically signed by:  Elizabeth Jordan RN  11/27/24 18:04 EST

## 2024-11-27 NOTE — PROGRESS NOTES
PROGRESS NOTE      Patient Name: oRck Maciel Jr.  : 1945  MRN: [id number]  Primary Care Physician: Denise Dickson APRN  Date of admission: 2024    Patient Care Team:  Denise Dickson APRN as PCP - General (Nurse Practitioner)  Azam Banegas Jr., MD as Consulting Physician (Cardiology)  Jamil Stevens MD as Consulting Physician (Nephrology)        Reason for Follow up:     KILLIAN on CKD       Subjective:     Seen and examined, no distress  sCr 2.24, 1.9L UOP    Review of systems:  Constitutional: weakness.  HEENT:  No headache, otalgia, itchy eyes, nasal discharge or sore throat.  Cardiac:  No chest pain, dyspnea, orthopnea or PND.  Chest:              No cough, phlegm or wheezing.  Abdomen:  abdominal pain  Neuro:  No focal weakness, abnormal movements or seizure-like activity.  :   No hematuria, no pyuria, no dysuria, no flank pain.  ROS was otherwise negative except as mentioned in the [place].       Medications:  Prior to Admission medications    Medication Sig Start Date End Date Taking? Authorizing Provider   amiodarone (PACERONE) 200 MG tablet Take 1 tablet by mouth Daily. 24  Yes Jo Toney MD   apixaban (ELIQUIS) 2.5 MG tablet tablet Take 1 tablet by mouth 2 (Two) Times a Day. Indications: Atrial Fibrillation 11/15/24  Yes Apurva Smith APRN   bumetanide (BUMEX) 1 MG tablet Take 1 tablet by mouth 2 (Two) Times a Day. 11/15/24  Yes Apurva Smiht APRN   Cholecalciferol 25 MCG (1000 UT) tablet Take 1 tablet by mouth Daily.   Yes Provider, MD Shivam   glipizide (GLUCOTROL) 5 MG tablet TAKE 1 TABLET BY MOUTH 2 TIMES A DAY BEFORE MEALS. 10/29/24  Yes Denise Dickson APRN   hydrALAZINE (APRESOLINE) 25 MG tablet Take 1 tablet by mouth 3 (Three) Times a Day. 24  Yes Apurva Smith APRN   linagliptin (Tradjenta) 5 MG tablet tablet Take 1 tablet by mouth Daily. 24  Yes Denise Dickson APRN   metoprolol succinate XL (TOPROL-XL)  25 MG 24 hr tablet Take 1 tablet by mouth Daily. 11/15/24  Yes Apurva Smith APRN   vitamin C (ASCORBIC ACID) 250 MG tablet Take 1 tablet by mouth Daily.   Yes ProviderShivam MD   Zinc 50 MG tablet  2/19/22  Yes Shivam Smith MD   atorvastatin (LIPITOR) 20 MG tablet Take 1 tablet by mouth Every Night. NEED TO SEE PROVIDER FOR FUTURE REFILLS. 10/10/24   Denise Dickson APRN   clopidogrel (PLAVIX) 75 MG tablet Take 1 tablet by mouth Daily. 10/16/24   Denise Dickson APRN   glucose blood (Accu-Chek Keshia Plus) test strip USE TO TEST BLOOD SUGAR    TWICE DAILY FOR DIABETES 7/29/24   Denise Dickson APRN   nitroglycerin (NITROSTAT) 0.4 MG SL tablet Place 1 tablet under the tongue Every 5 (Five) Minutes As Needed for Chest Pain (Only if SBP Greater Than 100). Take no more than 3 doses in 15 minutes. 11/15/24   Apurva Smith APRN   terazosin (HYTRIN) 5 MG capsule TAKE 1 CAPSULE BY MOUTH EVERY DAY AT BEDTIME FOR 90 DAYS 8/1/24   Provider, MD Shivam     Scheduled Meds:amiodarone, 200 mg, Oral, Daily  vitamin C, 250 mg, Oral, Daily  atorvastatin, 20 mg, Oral, Nightly  cholecalciferol, 1,000 Units, Oral, Daily  insulin lispro, 2-7 Units, Subcutaneous, 4x Daily AC & at Bedtime  linagliptin, 5 mg, Oral, Daily  Menthol-Zinc Oxide, 1 Application, Topical, Q12H  metoprolol succinate XL, 25 mg, Oral, Daily  sodium chloride, 10 mL, Intravenous, Q12H  terazosin, 1 mg, Oral, Nightly      Continuous Infusions:     PRN Meds:  acetaminophen **OR** acetaminophen **OR** acetaminophen    senna-docusate sodium **AND** polyethylene glycol **AND** bisacodyl **AND** bisacodyl    calcium carbonate    dextrose    dextrose    glucagon (human recombinant)    labetalol    ondansetron ODT **OR** ondansetron    sodium chloride    sodium chloride  Allergies:    Allergies   Allergen Reactions    Ceclor [Cefaclor] Rash     Rash in his 50s; he tolerated piperacillin-tazobactam in March 2020       Objective   Exam:      Vital Signs  Temp:  [97.7 °F (36.5 °C)-98.2 °F (36.8 °C)] 98.2 °F (36.8 °C)  Heart Rate:  [69-82] 69  Resp:  [16-18] 18  BP: (122-161)/(50-74) 134/50  SpO2:  [94 %-99 %] 99 %  on   ;   Device (Oxygen Therapy): room air  Body mass index is 34.57 kg/m².  EXAM  General:  ill appearing male in no acute distress.    Head:      Normocephalic and atraumatic.    Eyes:      PERRL/EOM intact, conjunctivae and sclerae clear without nystagmus.    Neck:      No masses, thyromegaly,  trachea central   Lungs:    Clear bilaterally to auscultation.    Heart:      Regular rate and rhythm, no murmur no gallop  Abd:        Soft, nontender, not distended, bowel sounds positive, no shifting dullness.  Msk:        No deformity or scoliosis noted of thoracic or lumbar spine.    Pulses:   Pulses normal in all 4 extremities.    Extremities:        No cyanosis or clubbing--edema.    Neuro:    No focal deficits.   alert oriented x3  Skin:       Intact without lesions or rashes.    Psych:    Alert and cooperative; normal mood and affect; normal attention span       Results Review:  I have personally reviewed most recent Data :  [facility]  CBC    Results from last 7 days   Lab Units 11/27/24  0742 11/26/24  0622 11/25/24  2231   WBC 10*3/mm3 5.38 9.90 11.29*   HEMOGLOBIN g/dL 9.2* 9.4* 10.8*   PLATELETS 10*3/mm3 262 277 304     CMP   Results from last 7 days   Lab Units 11/27/24  0742 11/26/24  0622 11/25/24  2231   SODIUM mmol/L 138 137 133*   POTASSIUM mmol/L 3.7 3.5 3.7   CHLORIDE mmol/L 101 95* 91*   CO2 mmol/L 29.0 30.5* 29.0   BUN mg/dL 52* 66* 71*   CREATININE mg/dL 2.24* 2.85* 3.32*   GLUCOSE mg/dL 87 111* 175*   ALBUMIN g/dL  --   --  4.1   BILIRUBIN mg/dL  --   --  0.5   ALK PHOS U/L  --   --  115   AST (SGOT) U/L  --   --  8   ALT (SGPT) U/L  --   --  16   LIPASE U/L  --   --  43     ABG      CT Abdomen Pelvis Without Contrast    Result Date: 11/25/2024  The patient has a colonic obstruction, with transition  point noted within a left inguinal hernia sac. No pneumatosis or free air is seen at this time, although there is a small amount of free fluid which is noted within the abdomen.  Radiation dose reduction techniques were utilized, including automated exposure control and exposure modulation based on body size.   This report was finalized on 11/25/2024 11:35 PM by Dr. Inocencia Cruz M.D on Workstation: [id number]       Results for orders placed during the hospital encounter of 10/03/24    Adult Transthoracic Echo Complete W/ Cont if Necessary Per Protocol    Interpretation Summary    Left ventricular ejection fraction appears to be 61 - 65%.    Left ventricular diastolic function was indeterminate.    The left atrial cavity is moderately dilated.    Left atrial volume is moderately increased.    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    Estimated right ventricular systolic pressure from tricuspid regurgitation is moderately elevated (45-55 mmHg).        Assessment & Plan   Assessment and Plan:         Incarcerated inguinal hernia    Essential hypertension    Type 2 diabetes mellitus with circulatory disorder, without long-term current use of insulin    Hyperlipidemia    H/O aortic valve replacement with porcine valve    S/P CABG x 2    CKD (chronic kidney disease) stage 3, GFR 30-59 ml/min    KILLIAN (acute kidney injury)    Stage 3b chronic kidney disease    Atrial fibrillation, persistent    ASSESSMENT:  KILLIAN   CKD III, with baseline creatinine ~ 2.0, secondary to diabetic and hypertensive nephropathy, creatinine 1.97 at last office appointment 07/29/2024. Prior serology negative, hepatitis profile negative, SPEP with immunofixation negative for monoclonal protein, serum free light chain ratio 1.5, serum complement C3-C4 normal, ZOIE negative, c-ANCA and p-ANCA negative. Prior urinalysis from 11/19/2024 with trace protein, UA from 11/26 with 30mg protein, large blood, 11-20 RBC. Urine osmolality 334, urine  sodium 39. Prior renal US from 11/8/24 with right kidney 10.7cm, left kidney 11.9 cm, no right sided hydronephrosis, the left kidney is significantly obscured by overlying shadowing and unfortunately left-sided renal pathology cannot be excluded.   abdominal pain, reported constipation. CT AP WO with colonic obstruction, with transition point noted within a left inguinal hernia sac. No pneumatosis or free air is seen at this time, although there is a small amount of free fluid which is noted within the abdomen  Afib, on Eliquis  CAD s/p CABG  Anemia  AVR  HTN  HLD  DM    Prior echo showed EF of 61 to 65%, intermediate LV diastolic function.  Left atrial cavity and moderately dilated.       PLAN :     Renal function, improving with volume resuscitation, sCr on admit 3.3, today 2.24 with great UOP  Volume fair, now off IVFs  Bp stable, monitor trends.  Electrolytes and acid/base stable   Anemia, will check iron studies, transfuse for hgb <7.0  General surgery following for colonic obstruction, with transition point noted within a left inguinal hernia sac. Plans for possible inguinal hernia repair   Daily weights, Strict I&O's  Avoid nephrotoxic agents including NSAIDs  We will follow and coordinate with team     Note transcribed for Dr Sadaf Turner, ZEINA  [facility]  11/27/2024  12:16 EST    I personally have examined the patient and interviewed the patient. I have reviewed the history, data, problems, assessment and plan with the nurse practitioner during rounds and I concur with the history, exam, assessment and plan as described in the progress note with comments additions, revisions as noted.  Note was transcribed by  ZEINA.

## 2024-11-27 NOTE — PROGRESS NOTES
[facility] Medicine Services  PROGRESS NOTE    Patient Name: Rock Maciel Jr.  : 1945  MRN: [id number]    Date of Admission: 2024  Primary Care Physician: Denise Dickson APRN    Subjective   Subjective     CC:  Follow-up hernia    Subjective: Patient says his abdominal pain continues to be improved.  He is eager to have surgical intervention soon.  He seems to understand the risks versus benefits.  He denies any other new complaints.    Review of Systems  No current fevers or chills  No current shortness of breath or cough  No current chest pain or palpitations       Objective   Objective     Vital Signs:   Temp:  [97.7 °F (36.5 °C)-98.2 °F (36.8 °C)] 98.1 °F (36.7 °C)  Heart Rate:  [69-82] 71  Resp:  [16-18] 18  BP: (122-161)/(50-74) 133/57        Physical Exam:  Constitutional:Awake, alert, elderly appearing  HENT: NCAT, mucous membranes moist, neck supple  Respiratory: No cough or wheezes, normal respirations, nonlabored breathing   Cardiovascular: Pulse rate is normal, palpable radial pulses  Gastrointestinal:  soft, nontender, nondistended  Musculoskeletal: Somewhat frail appearance, no lower extremity edema, BMI 34 obese  Psychiatric: Appropriate affect, cooperative, conversational  Neurologic: No slurred speech or facial droop, follows commands  Skin: No rashes or jaundice, warm      Results Reviewed:  Results from last 7 days   Lab Units 24  0742 24  0622 24  2231   WBC 10*3/mm3 5.38 9.90 11.29*   HEMOGLOBIN g/dL 9.2* 9.4* 10.8*   HEMATOCRIT % 28.6* 30.2* 33.5*   PLATELETS 10*3/mm3 262 277 304     Results from last 7 days   Lab Units 24  0742 24  0622 241   SODIUM mmol/L 138 137 133*   POTASSIUM mmol/L 3.7 3.5 3.7   CHLORIDE mmol/L 101 95* 91*   CO2 mmol/L 29.0 30.5* 29.0   BUN mg/dL 52* 66* 71*   CREATININE mg/dL 2.24* 2.85* 3.32*   GLUCOSE mg/dL 87 111* 175*   CALCIUM mg/dL 8.4* 8.5* 9.5   ALK PHOS U/L  --   --  115   ALT (SGPT) U/L  --    --  16   AST (SGOT) U/L  --   --  8     Estimated Creatinine Clearance: 35.5 mL/min (A) (by C-G formula based on SCr of 2.24 mg/dL (H)).    Microbiology Results Abnormal       None            Imaging Results (Last 24 Hours)       ** No results found for the last 24 hours. **            Results for orders placed during the hospital encounter of 10/03/24    Adult Transthoracic Echo Complete W/ Cont if Necessary Per Protocol    Interpretation Summary    Left ventricular ejection fraction appears to be 61 - 65%.    Left ventricular diastolic function was indeterminate.    The left atrial cavity is moderately dilated.    Left atrial volume is moderately increased.    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    Estimated right ventricular systolic pressure from tricuspid regurgitation is moderately elevated (45-55 mmHg).      I have reviewed the medications:  Scheduled Meds:amiodarone, 200 mg, Oral, Daily  vitamin C, 250 mg, Oral, Daily  atorvastatin, 20 mg, Oral, Nightly  cholecalciferol, 1,000 Units, Oral, Daily  insulin lispro, 2-7 Units, Subcutaneous, 4x Daily AC & at Bedtime  linagliptin, 5 mg, Oral, Daily  Menthol-Zinc Oxide, 1 Application, Topical, Q12H  metoprolol succinate XL, 25 mg, Oral, Daily  sodium chloride, 10 mL, Intravenous, Q12H  terazosin, 1 mg, Oral, Nightly      Continuous Infusions:   PRN Meds:.  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        Brief Hospital Course to date:  Rock Maciel Jr. is a 79 y.o. male presents to the hospital with incarcerated inguinal hernia and general surgery team was able to reduce the hernia in the emergency room.  Patient was also found to have acute kidney injury on CKD 3B.    Discussion/plan for today:  Clinically patient seems to be improved.  Stop IV fluid as KILLIAN is improving and creatinine trending down.  Slight decrease in anemia noted but likely thought to be related to dilution.  No active sign of bleeding has been reported.  Continue amiodarone for atrial fibrillation.  Hold anticoagulation for surgical intervention.  Appreciate nephrology recommendations regarding KILLIAN.  Monitor blood pressure and continue current medications and adjust as needed.  Alpha-blocker has been decreased while patient is acutely ill.  Tradjenta for diabetes.  Glucose reviewed.  Plan for correction insulin for now.  PT for debility recommending home with home health.  Wound care recommend Calmoseptine to coccyx/buttock partial skin loss mentioned.  Monitoring.  Atorvastatin for hyperlipidemia.  Case discussed with surgery and they are planning on surgical intervention for hernia.  Treatment plan discussed with patient is in agreement.    DVT Prophylaxis: Mechanical      Disposition: Most likely home following surgery    CODE STATUS:   Code Status and Medical Interventions: CPR (Attempt to Resuscitate); Full Support   Ordered at: 11/26/24 0157     Code Status (Patient has no pulse and is not breathing):    CPR (Attempt to  Resuscitate)     Medical Interventions (Patient has pulse or is breathing):    Full Support       Bishop Chakraborty MD  11/27/24

## 2024-11-27 NOTE — CASE MANAGEMENT/SOCIAL WORK
Discharge Planning Assessment  [facility]     Patient Name: Rock Maciel Jr.  MRN: [id number]  Today's Date: 11/27/2024    Admit Date: 11/25/2024    Plan: Home with HH vs SNF pending post op evaluations   Discharge Needs Assessment       Row Name 11/27/24 1750       Living Environment    People in Home alone    Current Living Arrangements home    Potentially Unsafe Housing Conditions none    Primary Care Provided by self    Family Caregiver if Needed child(lois), adult;friend(s)    Family Caregiver Names friend Debbie    Quality of Family Relationships involved;helpful       Resource/Environmental Concerns    Resource/Environmental Concerns none    Transportation Concerns none       Transition Planning    Patient/Family Anticipates Transition to home with help/services;inpatient rehabilitation facility    Patient/Family Anticipated Services at Transition skilled nursing;home health care    Transportation Anticipated family or friend will provide       Discharge Needs Assessment    Readmission Within the Last 30 Days no previous admission in last 30 days    Equipment Currently Used at Home walker, rolling;rollator;cpap;power chair,(recliner lift);shower chair;grab bar;lift device;glucometer    Concerns to be Addressed adjustment to diagnosis/illness    Anticipated Changes Related to Illness inability to care for self    Equipment Needed After Discharge none                   Discharge Plan       Row Name 11/27/24 7843       Plan    Plan Home with HH vs SNF pending post op evaluations    Patient/Family in Agreement with Plan yes    Plan Comments Spoke with patient at bedside, introduced self, explained CCP role and verified face sheet and pharmacy information. Pt lives alone in multi-level home with 3 DO and stair chair to second floor bedroom. He is usually IADL's, uses gb, s/c, rwx, has w/c but is broken, needs new. He has no preference for DME company, referral to [employer]. He has used [facility] and been to [place]  and [facility] in the past, He would not return to [facility] but would like referral to [facility] and [facility], would be willing to go to [facility] if no bed available at [facility].  Unsure what needs pt will have after surgery, if SNF indicated will need precert, referral to [facility] and [facility] pending. CCP will follow - Elizabeth PIERCE                  Continued Care and Services - Admitted Since 11/25/2024       Destination       Service Provider Request Status Services Address Phone Fax Patient Preferred    [place] [facility] Pending - Request Sent -- [address] [address] [phone] --    [place] [facility] - Request Sent -- [address] [facility] [address] [phone] --              Durable Medical Equipment       Service Provider Request Status Services Address Phone Fax Patient Preferred    [facility] Pending - Request Sent -- [address] [facility] [id number] [phone] --              Home Medical Care       Service Provider Request Status Services Address Phone Fax Patient Preferred     [facility] Pending - Request Sent -- [address] [address] [phone] --                  Expected Discharge Date and Time       Expected Discharge Date Expected Discharge Time    Dec 2, 2024            Demographic Summary       Row Name 11/27/24 1751       General Information    Admission Type inpatient                   Functional Status       Row Name 11/27/24 1751       Functional Status    Usual Activity Tolerance excellent    Current Activity Tolerance good       Assessment of Health Literacy    Health Literacy Good       Functional Status, IADL    Medications independent    Meal Preparation independent    Housekeeping independent    Laundry independent    Shopping independent       Mental Status    General Appearance WDL WDL       Mental Status Summary     Recent Changes in Mental Status/Cognitive Functioning no changes                   Psychosocial    No documentation.                  Abuse/Neglect    No documentation.                  Legal       Row Name 11/27/24 0003       Financial/Legal    Who Manages Finances if Patient Unable Dtr                   Substance Abuse    No documentation.                  Patient Forms    No documentation.                     Elizabeth Jordan RN

## 2024-11-27 NOTE — THERAPY TREATMENT NOTE
Patient Name: Rock Maciel Jr.  : 1945    MRN: [id number]                              Today's Date: 2024       Admit Date: 2024    Visit Dx:     ICD-10-CM ICD-9-CM   1. Incarcerated inguinal hernia  K40.30 550.10   2. SBO (small bowel obstruction)  K56.609 560.9   3. KILLIAN (acute kidney injury)  N17.9 584.9     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    Chronic venous insufficiency    Nonrheumatic aortic valve stenosis    Carotid stenosis, asymptomatic    Bradycardia    Hyperlipidemia    Bilateral carotid artery disease    Abnormal cardiovascular stress test    H/O aortic valve replacement with porcine valve    Charcot-Davida-Tooth disease-like deformity of foot    Amputated toe of right foot    Fall    Non-traumatic rhabdomyolysis    S/P CABG x 2    CKD (chronic kidney disease) stage 3, GFR 30-59 ml/min    Rupture of left quadriceps tendon    Constipation    KILLIAN (acute kidney injury)    Wound of right leg    Anemia    Chronic diastolic (congestive) heart failure    Amputated toe of left foot    Gas gangrene    Cellulitis of left lower limb    Acquired absence of left foot    Bilateral lower extremity edema    Stage 3b chronic kidney disease    History of pneumonia    Atelectasis of both lungs    Abnormal pleural fluid    Pleural thickening    Atrial fibrillation, persistent    Chronic anticoagulation    Persistent atrial fibrillation    Incarcerated inguinal hernia     Past Medical History:   Diagnosis Date    Allergic rhinitis     Bronchitis     Coronary artery disease     Diabetes mellitus      DM (diabetes mellitus)     Encounter for annual health examination 05/06/2014    Annual Health Assessment    Gout     Hiatal hernia     History of MRSA infection     RIGHT FOOT 2010    Hyperlipidemia     Hypertension     Murmur     Pneumothorax on right     Sleep apnea     pt wears CPAP at night    Wellness examination 06/24/2015    Annual Wellness Visit     Past Surgical History:   Procedure Laterality Date    ARTERY SURGERY Bilateral     carotid    CARDIAC CATHETERIZATION N/A 7/20/2018    Procedure: Left Heart Cath;  Surgeon: Jo Toney MD;  Location: [facility] CATH INVASIVE LOCATION;  Service: Cardiovascular    CARDIAC CATHETERIZATION N/A 7/20/2018    Procedure: Coronary angiography;  Surgeon: Jo Toney MD;  [facility] C[facility]ASIVE LOC[facility];  Service: Cardiovascular    CAROTID ENDARTERECTOMY Bilateral     COLONOSCOPY  2008    CORONARY ARTERY BYPASS GRAFT WITH AORTIC VALVE REPAIR/REPLACEMENT N/A 7/23/2018    Procedure: INTRAOPERATIVE ALEX, MIDLINE STERNOTOMY, CORONARY ARTERY BYPASS GRAFTING X 3 USING ENDOSCOPICALLY HARVESTED LEFT GREATER SAPHENOUS VEIN,  AORTIC VALVE REPLACEMENT USING 25MM LOPEZ II ULTRA PORCINE VALVE, PRP;  Surgeon: Phong Posey MD;  Location: [facility];  Service: Cardiothoracic    FOOT SURGERY Right 2010    5th digit removal    FOOT SURGERY Left 2011    1 digit removed    INCISION AND DRAINAGE LEG Right 3/28/2020    Procedure: DEBRIDMENT OF RIGHT CALF;  Surgeon: Ross Pineda MD;  Location: [facility] OR;  Service: Vascular;  Laterality: Right;    QUADRICEPS TENDON REPAIR Left 5/14/2019    Procedure: Left QUADRICEPS TENDON REPAIR;  Surgeon: Camilo Hunter MD;  Location: [facility] OR;  Service: Orthopedics    THORACENTESIS Right 11/21/2016    THORACOSCOPY Right 5/8/2017    Procedure: BRONCHOSCOPY, RIGHT VAT,  TOTAL DECORTICATION RIGHT LUNG, PLEURAL BX, PLACEMENT SUBPLEURAL PAIN CAATHETERS X2;  Surgeon: Donald Orlando III, MD;  Location:   [facility];  Service:     TONSILECTOMY, ADENOIDECTOMY, BILATERAL MYRINGOTOMY AND TUBES        General Information       Row Name 11/27/24 1600          Physical Therapy Time and Intention    Document Type therapy note (daily note)  -EF     Mode of Treatment individual therapy;physical therapy  -EF       Row Name 11/27/24 1600          General Information    Existing Precautions/Restrictions fall  -EF     Barriers to Rehab none identified  -EF       Row Name 11/27/24 1600          Cognition    Orientation Status (Cognition) oriented x 4  -EF       Row Name 11/27/24 1600          Safety Issues/Impairments Affecting Functional Mobility    Impairments Affecting Function (Mobility) balance;strength  -EF               User Key  (r) = Recorded By, (t) = Taken By, (c) = Cosigned By      Initials Name Provider Type    EF Madelin Bermeo PT Physical Therapist                   Mobility       Row Name 11/27/24 1601          Bed Mobility    Comment, (Bed Mobility) not tested; pt up in chair  -EF       Row Name 11/27/24 1601          Sit-Stand Transfer    Sit-Stand Bailey (Transfers) contact guard;standby assist;verbal cues  -EF     Assistive Device (Sit-Stand Transfers) walker, front-wheeled  -EF       Row Name 11/27/24 1601          Gait/Stairs (Locomotion)    Bailey Level (Gait) contact guard;verbal cues  -EF     Assistive Device (Gait) walker, front-wheeled  -EF     Distance in Feet (Gait) 300  -EF     Deviations/Abnormal Patterns (Gait) crispin decreased  -EF     Bilateral Gait Deviations forward flexed posture;heel strike decreased  B foot ER  -EF               User Key  (r) = Recorded By, (t) = Taken By, (c) = Cosigned By      Initials Name Provider Type    EF Madelin Bermeo PT Physical Therapist                   Obj/Interventions       Row Name 11/27/24 1601          Balance    Static Standing Balance standby assist  -EF     Dynamic Standing Balance contact guard  -EF     Position/Device Used,  Standing Balance walker, front-wheeled;supported  -EF               User Key  (r) = Recorded By, (t) = Taken By, (c) = Cosigned By      Initials Name Provider Type    EF Madelin Bermeo, PT Physical Therapist                   Goals/Plan    No documentation.                  Clinical Impression       Row Name 11/27/24 1601          Pain    Pretreatment Pain Rating 0/10 - no pain  -EF     Posttreatment Pain Rating 0/10 - no pain  -EF       Row Name 11/27/24 1601          Plan of Care Review    Plan of Care Reviewed With patient  -EF     Progress improving  -EF     Outcome Evaluation Pt demos good progress with strength and endurance, as evidenced by tolerance of increased activity. Pt up in chair, performed transfer with SBA and rwx. Pt ambulated 300' with rwx and CGA. Pt will continue to benefit from PT to maximize independence with mobility; encouraged pt to continue mobilizing 2-3x day with [facility] in addition to PT sessions. Rec HH PT at [place] as pt lives alone.  -EF       Row Name 11/27/24 1601          Therapy Assessment/Plan (PT)    Therapy Frequency (PT) 3 times/wk  -EF       Row Name 11/27/24 1601          Positioning and Restraints    Pre-Treatment Position sitting in chair/recliner  -EF     Post Treatment Position chair  -EF     In Chair reclined;call light within reach;encouraged to call for assist;exit alarm on;legs elevated  -EF               User Key  (r) = Recorded By, (t) = Taken By, (c) = Cosigned By      Initials Name Provider Type    EF Madelin Bermeo, PT Physical Therapist                   Outcome Measures       Row Name 11/27/24 1605 11/27/24 0848       How much help from another person do you currently need...    Turning from your back to your side while in flat bed without using bedrails? 4  -EF 3  -TH    Moving from lying on back to sitting on the side of a flat bed without bedrails? 3  -EF 3  -TH    Moving to and from a bed to a chair (including a wheelchair)? 3  -EF 3  -TH     Standing up from a chair using your arms (e.g., wheelchair, bedside chair)? 3  -EF 3  -TH    Climbing 3-5 steps with a railing? 3  -EF 3  -TH    To walk in hospital room? 3  -EF 3  -TH    AM-PAC 6 Clicks Score (PT) 19  -EF 18  -TH              User Key  (r) = Recorded By, (t) = Taken By, (c) = Cosigned By      Initials Name Provider Type     Madelin Bermeo, PT Physical Therapist    Georgie Stinson, RN Registered Nurse                                 Physical Therapy Education       Title: PT OT SLP Therapies (In Progress)       Topic: Physical Therapy (In Progress)       Point: Mobility training (Done)       Learning Progress Summary            Patient Acceptance, E, VU,NR by  at 11/27/2024 1605    Acceptance, E, VU,NR by  at 11/26/2024 1544                      Point: Home exercise program (Not Started)       Learner Progress:  Not documented in this visit.              Point: Body mechanics (Not Started)       Learner Progress:  Not documented in this visit.              Point: Precautions (Not Started)       Learner Progress:  Not documented in this visit.                              User Key       Initials Effective Dates Name Provider Type Discipline     05/31/24 -  Madelin Bermeo, PT Physical Therapist PT                  PT Recommendation and Plan  Planned Therapy Interventions (PT): balance training, bed mobility training, gait training, home exercise program, patient/family education, ROM (range of motion), stair training, strengthening, transfer training, postural re-education  Progress: improving  Outcome Evaluation: Pt demos good progress with strength and endurance, as evidenced by tolerance of increased activity. Pt up in chair, performed transfer with SBA and rwx. Pt ambulated 300' with rwx and CGA. Pt will continue to benefit from PT to maximize independence with mobility; encouraged pt to continue mobilizing 2-3x day with [facility] staff in addition to PT sessions. Rec HH PT at [place] as pt  lives alone.     Time Calculation:         PT Charges       Row Name 11/27/24 1606             Time Calculation    Start Time 1505  -EF      Stop Time 1522  -EF      Time Calculation (min) 17 min  -EF      PT Received On 11/27/24  -EF      PT - Next Appointment 11/29/24  -EF         Time Calculation- PT    Total Timed Code Minutes- PT 17 minute(s)  -EF         Timed Charges    41271 - PT Therapeutic Activity Minutes 17  -EF         Total Minutes    Timed Charges Total Minutes 17  -EF       Total Minutes 17  -EF                User Key  (r) = Recorded By, (t) = Taken By, (c) = Cosigned By      Initials Name Provider Type    EF Madelin Bermeo, PT Physical Therapist                  Therapy Charges for Today       Code Description Service Date Service Provider Modifiers Qty    21579293299 HC PT THERAPEUTIC ACT EA 15 MIN 11/26/2024 Madelin Bermeo, PT GP 1    30079417252 HC PT EVAL MOD COMPLEXITY 2 11/26/2024 Madelin Bermeo, PT GP 1    11331314132 HC PT THERAPEUTIC ACT EA 15 MIN 11/27/2024 Madelin Bermeo, PT GP 1            PT G-Codes  Outcome Measure Options: AM-PAC 6 Clicks Daily Activity (OT)  AM-PAC 6 Clicks Score (PT): 19  AM-PAC 6 Clicks Score (OT): 15  PT Discharge Summary  Anticipated Discharge Disposition (PT): home with home health    Madlein Bermeo PT  11/27/2024

## 2024-11-28 LAB
ANION GAP SERPL CALCULATED.3IONS-SCNC: 8 MMOL/L (ref 5–15)
BUN SERPL-MCNC: 47 MG/DL (ref 8–23)
BUN/CREAT SERPL: 19.3 (ref 7–25)
CALCIUM SPEC-SCNC: 8.3 MG/DL (ref 8.6–10.5)
CHLORIDE SERPL-SCNC: 102 MMOL/L (ref 98–107)
CO2 SERPL-SCNC: 29 MMOL/L (ref 22–29)
CREAT SERPL-MCNC: 2.43 MG/DL (ref 0.76–1.27)
DEPRECATED RDW RBC AUTO: 45.1 FL (ref 37–54)
EGFRCR SERPLBLD CKD-EPI 2021: 26.2 ML/MIN/1.73
ERYTHROCYTE [DISTWIDTH] IN BLOOD BY AUTOMATED COUNT: 13.8 % (ref 12.3–15.4)
FOLATE SERPL-MCNC: 5.38 NG/ML (ref 4.78–24.2)
GLUCOSE BLDC GLUCOMTR-MCNC: 100 MG/DL (ref 70–130)
GLUCOSE BLDC GLUCOMTR-MCNC: 121 MG/DL (ref 70–130)
GLUCOSE BLDC GLUCOMTR-MCNC: 219 MG/DL (ref 70–130)
GLUCOSE BLDC GLUCOMTR-MCNC: 232 MG/DL (ref 70–130)
GLUCOSE SERPL-MCNC: 105 MG/DL (ref 65–99)
HCT VFR BLD AUTO: 28.6 % (ref 37.5–51)
HGB BLD-MCNC: 9.1 G/DL (ref 13–17.7)
MCH RBC QN AUTO: 28.7 PG (ref 26.6–33)
MCHC RBC AUTO-ENTMCNC: 31.8 G/DL (ref 31.5–35.7)
MCV RBC AUTO: 90.2 FL (ref 79–97)
PLATELET # BLD AUTO: 260 10*3/MM3 (ref 140–450)
PMV BLD AUTO: 9.9 FL (ref 6–12)
POTASSIUM SERPL-SCNC: 3.8 MMOL/L (ref 3.5–5.2)
RBC # BLD AUTO: 3.17 10*6/MM3 (ref 4.14–5.8)
SODIUM SERPL-SCNC: 139 MMOL/L (ref 136–145)
VIT B12 BLD-MCNC: 482 PG/ML (ref 211–946)
WBC NRBC COR # BLD AUTO: 5.73 10*3/MM3 (ref 3.4–10.8)

## 2024-11-28 PROCEDURE — 85027 COMPLETE CBC AUTOMATED: CPT | Performed by: INTERNAL MEDICINE

## 2024-11-28 PROCEDURE — 82948 REAGENT STRIP/BLOOD GLUCOSE: CPT

## 2024-11-28 PROCEDURE — 82607 VITAMIN B-12: CPT | Performed by: INTERNAL MEDICINE

## 2024-11-28 PROCEDURE — 99231 SBSQ HOSP IP/OBS SF/LOW 25: CPT | Performed by: SURGERY

## 2024-11-28 PROCEDURE — 80048 BASIC METABOLIC PNL TOTAL CA: CPT | Performed by: INTERNAL MEDICINE

## 2024-11-28 PROCEDURE — 82746 ASSAY OF FOLIC ACID SERUM: CPT | Performed by: INTERNAL MEDICINE

## 2024-11-28 PROCEDURE — 63710000001 INSULIN LISPRO (HUMAN) PER 5 UNITS: Performed by: NURSE PRACTITIONER

## 2024-11-28 RX ORDER — SODIUM CHLORIDE 9 MG/ML
75 INJECTION, SOLUTION INTRAVENOUS CONTINUOUS
Status: DISCONTINUED | OUTPATIENT
Start: 2024-11-28 | End: 2024-11-30 | Stop reason: HOSPADM

## 2024-11-28 RX ORDER — SODIUM CHLORIDE 9 MG/ML
100 INJECTION, SOLUTION INTRAVENOUS CONTINUOUS
Status: ACTIVE | OUTPATIENT
Start: 2024-11-28 | End: 2024-11-28

## 2024-11-28 RX ADMIN — TERAZOSIN 1 MG: 1 CAPSULE ORAL at 22:45

## 2024-11-28 RX ADMIN — SODIUM CHLORIDE 100 ML/HR: 9 INJECTION, SOLUTION INTRAVENOUS at 11:00

## 2024-11-28 RX ADMIN — INSULIN LISPRO 3 UNITS: 100 INJECTION, SOLUTION INTRAVENOUS; SUBCUTANEOUS at 22:45

## 2024-11-28 RX ADMIN — Medication 1000 UNITS: at 10:13

## 2024-11-28 RX ADMIN — Medication 1 APPLICATION: at 22:46

## 2024-11-28 RX ADMIN — INSULIN LISPRO 3 UNITS: 100 INJECTION, SOLUTION INTRAVENOUS; SUBCUTANEOUS at 12:35

## 2024-11-28 RX ADMIN — ATORVASTATIN CALCIUM 20 MG: 20 TABLET, FILM COATED ORAL at 22:45

## 2024-11-28 RX ADMIN — OXYCODONE HYDROCHLORIDE AND ACETAMINOPHEN 250 MG: 500 TABLET ORAL at 10:13

## 2024-11-28 RX ADMIN — METOPROLOL SUCCINATE 25 MG: 25 TABLET, EXTENDED RELEASE ORAL at 10:13

## 2024-11-28 RX ADMIN — LINAGLIPTIN 5 MG: 5 TABLET, FILM COATED ORAL at 10:14

## 2024-11-28 RX ADMIN — Medication 10 ML: at 22:46

## 2024-11-28 RX ADMIN — Medication 10 ML: at 10:19

## 2024-11-28 RX ADMIN — SODIUM CHLORIDE 75 ML/HR: 9 INJECTION, SOLUTION INTRAVENOUS at 15:54

## 2024-11-28 RX ADMIN — Medication 1 APPLICATION: at 10:18

## 2024-11-28 RX ADMIN — AMIODARONE HYDROCHLORIDE 200 MG: 200 TABLET ORAL at 10:13

## 2024-11-28 NOTE — PLAN OF CARE
Goal Outcome Evaluation:               Alert and Oriented x4. NPO since midnight. Scheduled for surgery on Friday, but in the event that an opening is available, may have surgery today. No complaints of pain, bm 11/27. Ambulation improving. Wearing 2L NC at night, Room Air during daytime.

## 2024-11-28 NOTE — PROGRESS NOTES
Colorectal & General Surgery  Progress Note    Patient: Rock Maciel Jr.  YOB: 1945  MRN: [id number]      Assessment  Rock Maciel Jr. is a 79 y.o. male with incarcerated left inguinal hernia now status post reduction.  He is having excellent bowel function.  I think it safe to proceed with robot-assisted left inguinal hernia, possible bilateral inguinal hernia, repair tomorrow.  N.p.o. after midnight.    Subjective  Feels well today.  Multiple bowel movements and passing flatus.    Objective    Vitals:    11/28/24 1310   BP: 134/53   Pulse: 57   Resp: 18   Temp: 98.2 °F (36.8 °C)   SpO2: 96%       Physical Exam  Constitutional: Well-developed well-nourished, no acute distress  Neck: Supple, trachea midline  Respiratory: No increased work of breathing, Symmetric excursion  Cardiovascular: Well pefursed, no jugular venous distention evident   Abdominal: Left inguinal hernia, reducible.  Soft, non-tender, non-distended  Skin: Warm, dry, no rash on visualized skin surfaces  Psychiatric: Alert and oriented ×3, normal affect     Laboratory Results  I have personally reviewed CBC with WBC 5, hemoglobin 9, platelet 260.  CMP with creatinine 2.4.    Radiology  None pertinent         Erick Lazo MD  Colorectal & General Surgery  [facility]    [address], [address]  [place], [place], [address]  P: [phone]  F: [phone]

## 2024-11-28 NOTE — PROGRESS NOTES
[facility] Medicine Services  PROGRESS NOTE    Patient Name: Rock Maciel Jr.  : 1945  MRN: [id number]    Date of Admission: 2024  Primary Care Physician: Denise Dickson APRN    Subjective   Subjective     CC:  Follow-up hernia    Subjective: Patient is n.p.o. and hoping they can find a time for surgery today.  If not he is agreeable to do it tomorrow.  He denies any new abdominal symptoms or other complaints.      Review of Systems  No current fevers or chills  No current shortness of breath or cough  No current chest pain or palpitations       Objective   Objective     Vital Signs:   Temp:  [97.9 °F (36.6 °C)-98.6 °F (37 °C)] 98.6 °F (37 °C)  Heart Rate:  [62-73] 66  Resp:  [18] 18  BP: (132-156)/(53-61) 132/53        Physical Exam:  Constitutional:Awake, alert, elderly appearing  HENT: NCAT, mucous membranes moist, neck supple  Respiratory: No cough or wheezes, normal respirations, nonlabored breathing   Cardiovascular: Pulse rate is normal, palpable radial pulses  Gastrointestinal:  soft, nontender, nondistended  Musculoskeletal: Somewhat frail appearance, no lower extremity edema, BMI 34 obese  Psychiatric: Appropriate affect, cooperative, conversational  Neurologic: No slurred speech or facial droop, follows commands  Skin: No rashes or jaundice, warm      Results Reviewed:  Results from last 7 days   Lab Units 24  0553 24  0742 24   WBC 10*3/mm3 5.73 5.38 9.90   HEMOGLOBIN g/dL 9.1* 9.2* 9.4*   HEMATOCRIT % 28.6* 28.6* 30.2*   PLATELETS 10*3/mm3 260 262 277     Results from last 7 days   Lab Units 24  0553 24  0742 24  0622 24  2231   SODIUM mmol/L 139 138 137 133*   POTASSIUM mmol/L 3.8 3.7 3.5 3.7   CHLORIDE mmol/L 102 101 95* 91*   CO2 mmol/L 29.0 29.0 30.5* 29.0   BUN mg/dL 47* 52* 66* 71*   CREATININE mg/dL 2.43* 2.24* 2.85* 3.32*   GLUCOSE mg/dL 105* 87 111* 175*   CALCIUM mg/dL 8.3* 8.4* 8.5* 9.5   ALK PHOS U/L  --   --   --   115   ALT (SGPT) U/L  --   --   --  16   AST (SGOT) U/L  --   --   --  8     Estimated Creatinine Clearance: 32.8 mL/min (A) (by C-G formula based on SCr of 2.43 mg/dL (H)).    Microbiology Results Abnormal       None            Imaging Results (Last 24 Hours)       ** No results found for the last 24 hours. **            Results for orders placed during the hospital encounter of 10/03/24    Adult Transthoracic Echo Complete W/ Cont if Necessary Per Protocol    Interpretation Summary    Left ventricular ejection fraction appears to be 61 - 65%.    Left ventricular diastolic function was indeterminate.    The left atrial cavity is moderately dilated.    Left atrial volume is moderately increased.    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    Estimated right ventricular systolic pressure from tricuspid regurgitation is moderately elevated (45-55 mmHg).      I have reviewed the medications:  Scheduled Meds:amiodarone, 200 mg, Oral, Daily  vitamin C, 250 mg, Oral, Daily  atorvastatin, 20 mg, Oral, Nightly  cholecalciferol, 1,000 Units, Oral, Daily  insulin lispro, 2-7 Units, Subcutaneous, 4x Daily AC & at Bedtime  linagliptin, 5 mg, Oral, Daily  Menthol-Zinc Oxide, 1 Application, Topical, Q12H  metoprolol succinate XL, 25 mg, Oral, Daily  sodium chloride, 10 mL, Intravenous, Q12H  terazosin, 1 mg, Oral, Nightly      Continuous Infusions:   PRN Meds:.  acetaminophen **OR** acetaminophen **OR** acetaminophen    senna-docusate sodium **AND** polyethylene glycol **AND** bisacodyl **AND** bisacodyl    calcium carbonate    dextrose    dextrose    glucagon (human recombinant)    labetalol    ondansetron ODT **OR** ondansetron    sodium chloride    sodium chloride    Assessment & Plan   Assessment & Plan     Active Hospital Problems    Diagnosis  POA    **Incarcerated inguinal hernia [K40.30]  Yes    Atrial fibrillation, persistent [I48.19]  Yes    Stage 3b chronic kidney disease [N18.32]  Yes    KILLIAN (acute kidney  injury) [N17.9]  Yes    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        Brief Hospital Course to date:  Rock Maciel Jr. is a 79 y.o. male presents to the hospital with incarcerated inguinal hernia and general surgery team was able to reduce the hernia in the emergency room.  Patient was also found to have acute kidney injury on CKD 3B.    Discussion/plan for today:  Patient seems to be clinically stable today.  Renal function relatively stable compared to yesterday.  Plan to give a total of 500 mL IV fluid gradually today while he is n.p.o. to avoid any worsening of KILLIAN.  Anemia is relatively stable today.  Discussed with patient regarding his stability and his wheelchair at home is currently nonfunctional.  He has requested a new wheelchair and I have ordered this through case management.  He is hoping to have this delivered to his house. Continue amiodarone for atrial fibrillation.  Hold anticoagulation for surgical intervention.  Appreciate nephrology recommendations regarding KILLIAN.  Monitor blood pressure and continue current medications and adjust as needed.  Alpha-blocker has been decreased while patient is acutely ill.  Tradjenta for diabetes.  Glucose reviewed.  Plan for correction insulin for now.  PT for debility recommending home with home health.  Wound care recommend Calmoseptine to coccyx/buttock partial skin loss mentioned.  Monitoring.  Atorvastatin for hyperlipidemia.  Case discussed with surgery earlier and they are planning on surgical intervention for hernia possibly today or tomorrow.  Plan discussed with  surgery further.  Treatment plan discussed with patient is in agreement.    DVT Prophylaxis: Mechanical      Disposition: Most likely home following surgery    CODE STATUS:   Code Status and Medical Interventions: CPR (Attempt to Resuscitate); Full Support   Ordered at: 11/26/24 0157     Code Status (Patient has no pulse and is not breathing):    CPR (Attempt to Resuscitate)     Medical Interventions (Patient has pulse or is breathing):    Full Support       Bishop Chakraborty MD  11/28/24

## 2024-11-28 NOTE — PROGRESS NOTES
PROGRESS NOTE        Patient Name: Rock Maciel Jr.  : 1945  MRN: [id number]  Primary Care Physician: Denise Dickson APRN  Date of admission: 2024     Patient Care Team:  Denise Dickson APRN as PCP - General (Nurse Practitioner)  Azam Banegas Jr., MD as Consulting Physician (Cardiology)  Jamil Stevens MD as Consulting Physician (Nephrology)           Reason for Follow up:      KILLIAN on CKD        Subjective:      Seen and examined, no distress  sCr 2.43, 1L UOP     Review of systems:  Constitutional: weakness.  HEENT:           No headache, otalgia, itchy eyes, nasal discharge or sore throat.  Cardiac:           No chest pain, dyspnea, orthopnea or PND.  Chest:              No cough, phlegm or wheezing.  Abdomen:        abdominal pain  Neuro:             No focal weakness, abnormal movements or seizure-like activity.  :                  No hematuria, no pyuria, no dysuria, no flank pain.  ROS was otherwise negative except as mentioned in the [place].         Medications:          Prior to Admission medications    Medication Sig Start Date End Date Taking? Authorizing Provider   amiodarone (PACERONE) 200 MG tablet Take 1 tablet by mouth Daily. 24   Yes Jo Toney MD   apixaban (ELIQUIS) 2.5 MG tablet tablet Take 1 tablet by mouth 2 (Two) Times a Day. Indications: Atrial Fibrillation 11/15/24   Yes Apurva Smith APRN   bumetanide (BUMEX) 1 MG tablet Take 1 tablet by mouth 2 (Two) Times a Day. 11/15/24   Yes Apurva Smith APRN   Cholecalciferol 25 MCG (1000 UT) tablet Take 1 tablet by mouth Daily.     Yes Provider, MD Shivam   glipizide (GLUCOTROL) 5 MG tablet TAKE 1 TABLET BY MOUTH 2 TIMES A DAY BEFORE MEALS. 10/29/24   Yes Denise Dickson APRN   hydrALAZINE (APRESOLINE) 25 MG tablet Take 1 tablet by mouth 3 (Three) Times a Day. 24   Yes Apurva Smith APRN   linagliptin (Tradjenta) 5 MG tablet tablet Take 1 tablet by mouth Daily.  11/19/24   Yes Denise Dickson APRN   metoprolol succinate XL (TOPROL-XL) 25 MG 24 hr tablet Take 1 tablet by mouth Daily. 11/15/24   Yes Apurva Smith APRN   vitamin C (ASCORBIC ACID) 250 MG tablet Take 1 tablet by mouth Daily.     Yes Shivam Smith MD   Zinc 50 MG tablet   2/19/22   Yes Shivam Smith MD   atorvastatin (LIPITOR) 20 MG tablet Take 1 tablet by mouth Every Night. NEED TO SEE PROVIDER FOR FUTURE REFILLS. 10/10/24     Denise Dickson APRN   clopidogrel (PLAVIX) 75 MG tablet Take 1 tablet by mouth Daily. 10/16/24     Denise Dickson APRN   glucose blood (Accu-Chek Keshia Plus) test strip USE TO TEST BLOOD SUGAR    TWICE DAILY FOR DIABETES 7/29/24     Denise Dickson APRN   nitroglycerin (NITROSTAT) 0.4 MG SL tablet Place 1 tablet under the tongue Every 5 (Five) Minutes As Needed for Chest Pain (Only if SBP Greater Than 100). Take no more than 3 doses in 15 minutes. 11/15/24     Apurva Smith APRN   terazosin (HYTRIN) 5 MG capsule TAKE 1 CAPSULE BY MOUTH EVERY DAY AT BEDTIME FOR 90 DAYS 8/1/24     ProviderShivam MD      Scheduled Meds:  Scheduled Medication   amiodarone, 200 mg, Oral, Daily  vitamin C, 250 mg, Oral, Daily  atorvastatin, 20 mg, Oral, Nightly  cholecalciferol, 1,000 Units, Oral, Daily  insulin lispro, 2-7 Units, Subcutaneous, 4x Daily AC & at Bedtime  linagliptin, 5 mg, Oral, Daily  Menthol-Zinc Oxide, 1 Application, Topical, Q12H  metoprolol succinate XL, 25 mg, Oral, Daily  sodium chloride, 10 mL, Intravenous, Q12H  terazosin, 1 mg, Oral, Nightly         Continuous Infusions:  Infusion Medications            PRN Meds:  PRN Medication     acetaminophen **OR** acetaminophen **OR** acetaminophen    senna-docusate sodium **AND** polyethylene glycol **AND** bisacodyl **AND** bisacodyl    calcium carbonate    dextrose    dextrose    glucagon (human recombinant)    labetalol    ondansetron ODT **OR** ondansetron    sodium chloride    sodium  chloride     Allergies:    Allergies         Allergies   Allergen Reactions    Ceclor [Cefaclor] Rash       Rash in his 50s; he tolerated piperacillin-tazobactam in March 2020               Objective  Exam:      Vital Signs  Temp:  [97.7 °F (36.5 °C)-98.2 °F (36.8 °C)] 98.2 °F (36.8 °C)  Heart Rate:  [69-82] 69  Resp:  [16-18] 18  BP: (122-161)/(50-74) 134/50  SpO2:  [94 %-99 %] 99 %  on   ;   Device (Oxygen Therapy): room air  Body mass index is 34.57 kg/m².  EXAM  General:  ill appearing male in no acute distress.    Head:      Normocephalic and atraumatic.    Eyes:      PERRL/EOM intact, conjunctivae and sclerae clear without nystagmus.    Neck:      No masses, thyromegaly,  trachea central   Lungs:    Clear bilaterally to auscultation.    Heart:      Regular rate and rhythm, no murmur no gallop  Abd:        Soft, nontender, not distended, bowel sounds positive, no shifting dullness.  Msk:        No deformity or scoliosis noted of thoracic or lumbar spine.    Pulses:   Pulses normal in all 4 extremities.    Extremities:        No cyanosis or clubbing trace edema.    Neuro:    No focal deficits.   alert oriented x3  Skin:       Intact without lesions or rashes.    Psych:    Alert and cooperative; normal mood and affect; normal attention span        Results Review:  I have personally reviewed most recent Data :  BMP @LABRCNT(creatinine:10)  CBC           Results from last 7 days   Lab Units 11/27/24  0742 11/26/24  0622 11/25/24  2231   WBC 10*3/mm3 5.38 9.90 11.29*   HEMOGLOBIN g/dL 9.2* 9.4* 10.8*   PLATELETS 10*3/mm3 262 277 304      CMP          Results from last 7 days   Lab Units 11/27/24  0742 11/26/24  0622 11/25/24  2231   SODIUM mmol/L 138 137 133*   POTASSIUM mmol/L 3.7 3.5 3.7   CHLORIDE mmol/L 101 95* 91*   CO2 mmol/L 29.0 30.5* 29.0   BUN mg/dL 52* 66* 71*   CREATININE mg/dL 2.24* 2.85* 3.32*   GLUCOSE mg/dL 87 111* 175*   ALBUMIN g/dL  --   --  4.1   BILIRUBIN mg/dL  --   --  0.5   ALK PHOS U/L  --    --  115   AST (SGOT) U/L  --   --  8   ALT (SGPT) U/L  --   --  16   LIPASE U/L  --   --  43      ABG      CT Abdomen Pelvis Without Contrast     Result Date: 11/25/2024  The patient has a colonic obstruction, with transition point noted within a left inguinal hernia sac. No pneumatosis or free air is seen at this time, although there is a small amount of free fluid which is noted within the abdomen.  Radiation dose reduction techniques were utilized, including automated exposure control and exposure modulation based on body size.   This report was finalized on 11/25/2024 11:35 PM by Dr. Inocencia Cruz M.D on Workstation: [id number]        Results for orders placed during the hospital encounter of 10/03/24     Adult Transthoracic Echo Complete W/ Cont if Necessary Per Protocol     Interpretation Summary    Left ventricular ejection fraction appears to be 61 - 65%.    Left ventricular diastolic function was indeterminate.    The left atrial cavity is moderately dilated.    Left atrial volume is moderately increased.    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    Estimated right ventricular systolic pressure from tricuspid regurgitation is moderately elevated (45-55 mmHg).              Assessment & Plan  Assessment and Plan:           Incarcerated inguinal hernia    Essential hypertension    Type 2 diabetes mellitus with circulatory disorder, without long-term current use of insulin    Hyperlipidemia    H/O aortic valve replacement with porcine valve    S/P CABG x 2    CKD (chronic kidney disease) stage 3, GFR 30-59 ml/min    KILLIAN (acute kidney injury)    Stage 3b chronic kidney disease    Atrial fibrillation, persistent     ASSESSMENT:  KILLIAN   CKD III, with baseline creatinine ~ 2.0, secondary to diabetic and hypertensive nephropathy, creatinine 1.97 at last office appointment 07/29/2024. Prior serology negative, hepatitis profile negative, SPEP with immunofixation negative for monoclonal protein, serum  free light chain ratio 1.5, serum complement C3-C4 normal, ZOIE negative, c-ANCA and p-ANCA negative. Prior urinalysis from 11/19/2024 with trace protein, UA from 11/26 with 30mg protein, large blood, 11-20 RBC. Urine osmolality 334, urine sodium 39. Prior renal US from 11/8/24 with right kidney 10.7cm, left kidney 11.9 cm, no right sided hydronephrosis, the left kidney is significantly obscured by overlying shadowing and unfortunately left-sided renal pathology cannot be excluded.   abdominal pain, reported constipation. CT AP WO with colonic obstruction, with transition point noted within a left inguinal hernia sac. No pneumatosis or free air is seen at this time, although there is a small amount of free fluid which is noted within the abdomen  Afib, on Eliquis  CAD s/p CABG  Anemia  AVR  HTN  HLD  DM     Prior echo showed EF of 61 to 65%, intermediate LV diastolic function.  Left atrial cavity and moderately dilated.         PLAN :      Renal function unchanged, near baseline 2.4, UOP 1l/24 hrs.  Tolerating oral intake. Off IVF. Cont to trend.  Bp stable, monitor trends.  Electrolytes and acid/base stable   Anemia, hgb 9.1, no thrombocytopenia unchanged. t sat  11% ,transfuse for hgb <7.0  General surgery following for colonic obstruction, with transition point noted within a left inguinal hernia sac. Awaiting surgical input.  Daily weights,   Strict I&O's  Avoid nephrotoxic agents including NSAIDs  We will follow and coordinate with team      Note transcribed for Dr Mccrary

## 2024-11-28 NOTE — PROGRESS NOTES
PROGRESS NOTE      Patient Name: Rock Maciel Jr.  : 1945  MRN: [id number]  Primary Care Physician: Denise Dickson APRN  Date of admission: 2024    Patient Care Team:  Denise Dickson APRN as PCP - General (Nurse Practitioner)  Azam Banegas Jr., MD as Consulting Physician (Cardiology)  Jamil Stevens MD as Consulting Physician (Nephrology)        Reason for Follow up:     KILLIAN on CKD       Subjective:     Seen and examined, no distress  Creatinine 2.4,    Review of systems:  Constitutional: weakness.  HEENT:  No headache, otalgia, itchy eyes, nasal discharge or sore throat.  Cardiac:  No chest pain, dyspnea, orthopnea or PND.  Chest:              No cough, phlegm or wheezing.  Abdomen:  abdominal pain  Neuro:  No focal weakness, abnormal movements or seizure-like activity.  :   No hematuria, no pyuria, no dysuria, no flank pain.  ROS was otherwise negative except as mentioned in the [place].       Medications:  Prior to Admission medications    Medication Sig Start Date End Date Taking? Authorizing Provider   amiodarone (PACERONE) 200 MG tablet Take 1 tablet by mouth Daily. 24  Yes Jo Toney MD   apixaban (ELIQUIS) 2.5 MG tablet tablet Take 1 tablet by mouth 2 (Two) Times a Day. Indications: Atrial Fibrillation 11/15/24  Yes Apurva Smith APRN   bumetanide (BUMEX) 1 MG tablet Take 1 tablet by mouth 2 (Two) Times a Day. 11/15/24  Yes Apurva Smith APRN   Cholecalciferol 25 MCG (1000 UT) tablet Take 1 tablet by mouth Daily.   Yes Provider, MD Shivam   glipizide (GLUCOTROL) 5 MG tablet TAKE 1 TABLET BY MOUTH 2 TIMES A DAY BEFORE MEALS. 10/29/24  Yes Denise Dickson APRN   hydrALAZINE (APRESOLINE) 25 MG tablet Take 1 tablet by mouth 3 (Three) Times a Day. 24  Yes Apurva Smith APRN   linagliptin (Tradjenta) 5 MG tablet tablet Take 1 tablet by mouth Daily. 24  Yes Denise Dickson APRN   metoprolol succinate XL (TOPROL-XL) 25  MG 24 hr tablet Take 1 tablet by mouth Daily. 11/15/24  Yes Apurva Smith APRN   vitamin C (ASCORBIC ACID) 250 MG tablet Take 1 tablet by mouth Daily.   Yes ProviderShivam MD   Zinc 50 MG tablet  2/19/22  Yes Shivam Smith MD   atorvastatin (LIPITOR) 20 MG tablet Take 1 tablet by mouth Every Night. NEED TO SEE PROVIDER FOR FUTURE REFILLS. 10/10/24   Denise Dickson APRN   clopidogrel (PLAVIX) 75 MG tablet Take 1 tablet by mouth Daily. 10/16/24   Denise Dickson APRN   glucose blood (Accu-Chek Keshia Plus) test strip USE TO TEST BLOOD SUGAR    TWICE DAILY FOR DIABETES 7/29/24   Denise Dickson APRN   nitroglycerin (NITROSTAT) 0.4 MG SL tablet Place 1 tablet under the tongue Every 5 (Five) Minutes As Needed for Chest Pain (Only if SBP Greater Than 100). Take no more than 3 doses in 15 minutes. 11/15/24   Apurva Smith APRN   terazosin (HYTRIN) 5 MG capsule TAKE 1 CAPSULE BY MOUTH EVERY DAY AT BEDTIME FOR 90 DAYS 8/1/24   Provider, MD Shivam     Scheduled Meds:amiodarone, 200 mg, Oral, Daily  vitamin C, 250 mg, Oral, Daily  atorvastatin, 20 mg, Oral, Nightly  cholecalciferol, 1,000 Units, Oral, Daily  insulin lispro, 2-7 Units, Subcutaneous, 4x Daily AC & at Bedtime  linagliptin, 5 mg, Oral, Daily  Menthol-Zinc Oxide, 1 Application, Topical, Q12H  metoprolol succinate XL, 25 mg, Oral, Daily  sodium chloride, 10 mL, Intravenous, Q12H  terazosin, 1 mg, Oral, Nightly      Continuous Infusions:     PRN Meds:  acetaminophen **OR** acetaminophen **OR** acetaminophen    senna-docusate sodium **AND** polyethylene glycol **AND** bisacodyl **AND** bisacodyl    calcium carbonate    dextrose    dextrose    glucagon (human recombinant)    labetalol    ondansetron ODT **OR** ondansetron    sodium chloride    sodium chloride  Allergies:    Allergies   Allergen Reactions    Ceclor [Cefaclor] Rash     Rash in his 50s; he tolerated piperacillin-tazobactam in March 2020       Objective   Exam:      Vital Signs  Temp:  [97.9 °F (36.6 °C)-98.4 °F (36.9 °C)] 97.9 °F (36.6 °C)  Heart Rate:  [62-73] 73  Resp:  [18] 18  BP: (133-156)/(56-61) 156/56  SpO2:  [94 %-97 %] 94 %  on   ;   Device (Oxygen Therapy): room air  Body mass index is 34.57 kg/m².  EXAM  General:  ill appearing male in no acute distress.    Head:      Normocephalic and atraumatic.    Eyes:      PERRL/EOM intact, conjunctivae and sclerae clear without nystagmus.    Neck:      No masses, thyromegaly,  trachea central   Lungs:    Clear bilaterally to auscultation.    Heart:      Regular rate and rhythm, no murmur no gallop  Abd:        Soft, nontender, not distended, bowel sounds positive, no shifting dullness.  Msk:        No deformity or scoliosis noted of thoracic or lumbar spine.    Pulses:   Pulses normal in all 4 extremities.    Extremities:        No cyanosis or clubbing--edema.    Neuro:    No focal deficits.   alert oriented x3  Skin:       Intact without lesions or rashes.    Psych:    Alert and cooperative; normal mood and affect; normal attention span       Results Review:  I have personally reviewed most recent Data :  [facility]  CBC    Results from last 7 days   Lab Units 11/27/24  0742 11/26/24  0622 11/25/24  2231   WBC 10*3/mm3 5.38 9.90 11.29*   HEMOGLOBIN g/dL 9.2* 9.4* 10.8*   PLATELETS 10*3/mm3 262 277 304     CMP   Results from last 7 days   Lab Units 11/28/24  0553 11/27/24  0742 11/26/24  0622 11/25/24  2231   SODIUM mmol/L 139 138 137 133*   POTASSIUM mmol/L 3.8 3.7 3.5 3.7   CHLORIDE mmol/L 102 101 95* 91*   CO2 mmol/L 29.0 29.0 30.5* 29.0   BUN mg/dL 47* 52* 66* 71*   CREATININE mg/dL 2.43* 2.24* 2.85* 3.32*   GLUCOSE mg/dL 105* 87 111* 175*   ALBUMIN g/dL  --   --   --  4.1   BILIRUBIN mg/dL  --   --   --  0.5   ALK PHOS U/L  --   --   --  115   AST (SGOT) U/L  --   --   --  8   ALT (SGPT) U/L  --   --   --  16   LIPASE U/L  --   --   --  43     ABG      CT Abdomen Pelvis Without Contrast    Result  Date: 11/25/2024  The patient has a colonic obstruction, with transition point noted within a left inguinal hernia sac. No pneumatosis or free air is seen at this time, although there is a small amount of free fluid which is noted within the abdomen.  Radiation dose reduction techniques were utilized, including automated exposure control and exposure modulation based on body size.   This report was finalized on 11/25/2024 11:35 PM by Dr. Inocencia Cruz M.D on Workstation: [id number]       Results for orders placed during the hospital encounter of 10/03/24    Adult Transthoracic Echo Complete W/ Cont if Necessary Per Protocol    Interpretation Summary    Left ventricular ejection fraction appears to be 61 - 65%.    Left ventricular diastolic function was indeterminate.    The left atrial cavity is moderately dilated.    Left atrial volume is moderately increased.    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    Estimated right ventricular systolic pressure from tricuspid regurgitation is moderately elevated (45-55 mmHg).        Assessment & Plan   Assessment and Plan:         Incarcerated inguinal hernia    Essential hypertension    Type 2 diabetes mellitus with circulatory disorder, without long-term current use of insulin    Hyperlipidemia    H/O aortic valve replacement with porcine valve    S/P CABG x 2    CKD (chronic kidney disease) stage 3, GFR 30-59 ml/min    KILLIAN (acute kidney injury)    Stage 3b chronic kidney disease    Atrial fibrillation, persistent    ASSESSMENT:  KILLIAN   CKD III, with baseline creatinine ~ 2.0, secondary to diabetic and hypertensive nephropathy, creatinine 1.97 at last office appointment 07/29/2024. Prior serology negative, hepatitis profile negative, SPEP with immunofixation negative for monoclonal protein, serum free light chain ratio 1.5, serum complement C3-C4 normal, ZOIE negative, c-ANCA and p-ANCA negative. Prior urinalysis from 11/19/2024 with trace protein, UA from 11/26  with 30mg protein, large blood, 11-20 RBC. Urine osmolality 334, urine sodium 39. Prior renal US from 11/8/24 with right kidney 10.7cm, left kidney 11.9 cm, no right sided hydronephrosis, the left kidney is significantly obscured by overlying shadowing and unfortunately left-sided renal pathology cannot be excluded.   abdominal pain, reported constipation. CT AP WO with colonic obstruction, with transition point noted within a left inguinal hernia sac. No pneumatosis or free air is seen at this time, although there is a small amount of free fluid which is noted within the abdomen  Afib, on Eliquis  CAD s/p CABG  Anemia  AVR  HTN  HLD  DM    Prior echo showed EF of 61 to 65%, intermediate LV diastolic function.  Left atrial cavity and moderately dilated.       PLAN :     Renal function, improving with volume resuscitation, sCr on admit 3.3,    Kidney function around the baseline, noted n.p.o., surgery is following, as per the note plan for hernia repair tomorrow, clinically did not look volume overloaded, I would recommend continuing IV fluid for now, start isotonic fluid,    Electrolytes and acid/base stable   Anemia, will check iron studies, transfuse for hgb <7.0  Will closely follow,  Daily weights, Strict I&O's  Avoid nephrotoxic agents including NSAIDs  We will follow and coordinate with team          Lala Alarcon MD  [facility]  11/28/2024  07:48 EST

## 2024-11-29 ENCOUNTER — ANESTHESIA (OUTPATIENT)
Dept: PERIOP | Facility: HOSPITAL | Age: 79
End: 2024-11-29
Payer: MEDICARE

## 2024-11-29 ENCOUNTER — ANESTHESIA EVENT (OUTPATIENT)
Dept: PERIOP | Facility: HOSPITAL | Age: 79
End: 2024-11-29
Payer: MEDICARE

## 2024-11-29 LAB
ANION GAP SERPL CALCULATED.3IONS-SCNC: 7 MMOL/L (ref 5–15)
BUN SERPL-MCNC: 40 MG/DL (ref 8–23)
BUN/CREAT SERPL: 17.3 (ref 7–25)
CALCIUM SPEC-SCNC: 8.2 MG/DL (ref 8.6–10.5)
CHLORIDE SERPL-SCNC: 105 MMOL/L (ref 98–107)
CO2 SERPL-SCNC: 28 MMOL/L (ref 22–29)
CREAT SERPL-MCNC: 2.31 MG/DL (ref 0.76–1.27)
DEPRECATED RDW RBC AUTO: 45.6 FL (ref 37–54)
EGFRCR SERPLBLD CKD-EPI 2021: 27.9 ML/MIN/1.73
ERYTHROCYTE [DISTWIDTH] IN BLOOD BY AUTOMATED COUNT: 13.9 % (ref 12.3–15.4)
GLUCOSE BLDC GLUCOMTR-MCNC: 114 MG/DL (ref 70–130)
GLUCOSE BLDC GLUCOMTR-MCNC: 132 MG/DL (ref 70–130)
GLUCOSE BLDC GLUCOMTR-MCNC: 137 MG/DL (ref 70–130)
GLUCOSE BLDC GLUCOMTR-MCNC: 142 MG/DL (ref 70–130)
GLUCOSE BLDC GLUCOMTR-MCNC: 181 MG/DL (ref 70–130)
GLUCOSE SERPL-MCNC: 112 MG/DL (ref 65–99)
HCT VFR BLD AUTO: 28.5 % (ref 37.5–51)
HGB BLD-MCNC: 8.8 G/DL (ref 13–17.7)
MCH RBC QN AUTO: 28 PG (ref 26.6–33)
MCHC RBC AUTO-ENTMCNC: 30.9 G/DL (ref 31.5–35.7)
MCV RBC AUTO: 90.8 FL (ref 79–97)
PLATELET # BLD AUTO: 254 10*3/MM3 (ref 140–450)
PMV BLD AUTO: 9.7 FL (ref 6–12)
POTASSIUM SERPL-SCNC: 4 MMOL/L (ref 3.5–5.2)
RBC # BLD AUTO: 3.14 10*6/MM3 (ref 4.14–5.8)
SODIUM SERPL-SCNC: 140 MMOL/L (ref 136–145)
WBC NRBC COR # BLD AUTO: 5.62 10*3/MM3 (ref 3.4–10.8)

## 2024-11-29 PROCEDURE — 25010000002 ONDANSETRON PER 1 MG: Performed by: NURSE ANESTHETIST, CERTIFIED REGISTERED

## 2024-11-29 PROCEDURE — 25010000002 CEFOXITIN PER 1 G: Performed by: SURGERY

## 2024-11-29 PROCEDURE — 85027 COMPLETE CBC AUTOMATED: CPT | Performed by: INTERNAL MEDICINE

## 2024-11-29 PROCEDURE — 49650 LAP ING HERNIA REPAIR INIT: CPT | Performed by: SURGERY

## 2024-11-29 PROCEDURE — 82948 REAGENT STRIP/BLOOD GLUCOSE: CPT

## 2024-11-29 PROCEDURE — 25010000002 LIDOCAINE 2% SOLUTION: Performed by: NURSE ANESTHETIST, CERTIFIED REGISTERED

## 2024-11-29 PROCEDURE — 8E0W4CZ ROBOTIC ASSISTED PROCEDURE OF TRUNK REGION, PERCUTANEOUS ENDOSCOPIC APPROACH: ICD-10-PCS | Performed by: SURGERY

## 2024-11-29 PROCEDURE — 25810000003 SODIUM CHLORIDE 0.9 % SOLUTION: Performed by: SURGERY

## 2024-11-29 PROCEDURE — 25010000002 NA FERRIC GLUC CPLX PER 12.5 MG: Performed by: INTERNAL MEDICINE

## 2024-11-29 PROCEDURE — 0YU64JZ SUPPLEMENT LEFT INGUINAL REGION WITH SYNTHETIC SUBSTITUTE, PERCUTANEOUS ENDOSCOPIC APPROACH: ICD-10-PCS | Performed by: SURGERY

## 2024-11-29 PROCEDURE — 25010000002 PROPOFOL 200 MG/20ML EMULSION: Performed by: NURSE ANESTHETIST, CERTIFIED REGISTERED

## 2024-11-29 PROCEDURE — 25010000002 FENTANYL CITRATE (PF) 50 MCG/ML SOLUTION: Performed by: NURSE ANESTHETIST, CERTIFIED REGISTERED

## 2024-11-29 PROCEDURE — 63710000001 INSULIN LISPRO (HUMAN) PER 5 UNITS: Performed by: SURGERY

## 2024-11-29 PROCEDURE — S2900 ROBOTIC SURGICAL SYSTEM: HCPCS | Performed by: STUDENT IN AN ORGANIZED HEALTH CARE EDUCATION/TRAINING PROGRAM

## 2024-11-29 PROCEDURE — 80048 BASIC METABOLIC PNL TOTAL CA: CPT | Performed by: INTERNAL MEDICINE

## 2024-11-29 PROCEDURE — 49650 LAP ING HERNIA REPAIR INIT: CPT | Performed by: SPECIALIST/TECHNOLOGIST, OTHER

## 2024-11-29 PROCEDURE — 25810000003 LACTATED RINGERS PER 1000 ML: Performed by: ANESTHESIOLOGY

## 2024-11-29 PROCEDURE — 25810000003 SODIUM CHLORIDE PER 500 ML: Performed by: SURGERY

## 2024-11-29 PROCEDURE — 25010000002 SUGAMMADEX 200 MG/2ML SOLUTION: Performed by: NURSE ANESTHETIST, CERTIFIED REGISTERED

## 2024-11-29 PROCEDURE — C1781 MESH (IMPLANTABLE): HCPCS | Performed by: SURGERY

## 2024-11-29 DEVICE — IMPLANTABLE DEVICE: Type: IMPLANTABLE DEVICE | Site: ABDOMEN | Status: FUNCTIONAL

## 2024-11-29 DEVICE — KNOTLESS TISSUE CONTROL DEVICE, UNDYED UNIDIRECTIONAL (ANTIBACTERIAL) SYNTHETIC ABSORBABLE DEVICE
Type: IMPLANTABLE DEVICE | Site: PELVIS | Status: FUNCTIONAL
Brand: STRATAFIX

## 2024-11-29 RX ORDER — PROMETHAZINE HYDROCHLORIDE 25 MG/1
25 TABLET ORAL ONCE AS NEEDED
Status: DISCONTINUED | OUTPATIENT
Start: 2024-11-29 | End: 2024-11-29 | Stop reason: HOSPADM

## 2024-11-29 RX ORDER — PROMETHAZINE HYDROCHLORIDE 25 MG/1
25 SUPPOSITORY RECTAL ONCE AS NEEDED
Status: DISCONTINUED | OUTPATIENT
Start: 2024-11-29 | End: 2024-11-29 | Stop reason: HOSPADM

## 2024-11-29 RX ORDER — HYDROCODONE BITARTRATE AND ACETAMINOPHEN 5; 325 MG/1; MG/1
1 TABLET ORAL ONCE AS NEEDED
Status: DISCONTINUED | OUTPATIENT
Start: 2024-11-29 | End: 2024-11-29 | Stop reason: HOSPADM

## 2024-11-29 RX ORDER — EPHEDRINE SULFATE 50 MG/ML
5 INJECTION, SOLUTION INTRAVENOUS ONCE AS NEEDED
Status: DISCONTINUED | OUTPATIENT
Start: 2024-11-29 | End: 2024-11-29 | Stop reason: HOSPADM

## 2024-11-29 RX ORDER — SODIUM CHLORIDE 0.9 % (FLUSH) 0.9 %
3 SYRINGE (ML) INJECTION EVERY 12 HOURS SCHEDULED
Status: DISCONTINUED | OUTPATIENT
Start: 2024-11-29 | End: 2024-11-29 | Stop reason: HOSPADM

## 2024-11-29 RX ORDER — DROPERIDOL 2.5 MG/ML
0.62 INJECTION, SOLUTION INTRAMUSCULAR; INTRAVENOUS
Status: DISCONTINUED | OUTPATIENT
Start: 2024-11-29 | End: 2024-11-29 | Stop reason: HOSPADM

## 2024-11-29 RX ORDER — ONDANSETRON 2 MG/ML
INJECTION INTRAMUSCULAR; INTRAVENOUS AS NEEDED
Status: DISCONTINUED | OUTPATIENT
Start: 2024-11-29 | End: 2024-11-29 | Stop reason: SURG

## 2024-11-29 RX ORDER — FLUMAZENIL 0.1 MG/ML
0.2 INJECTION INTRAVENOUS AS NEEDED
Status: DISCONTINUED | OUTPATIENT
Start: 2024-11-29 | End: 2024-11-29 | Stop reason: HOSPADM

## 2024-11-29 RX ORDER — HYDRALAZINE HYDROCHLORIDE 20 MG/ML
5 INJECTION INTRAMUSCULAR; INTRAVENOUS
Status: DISCONTINUED | OUTPATIENT
Start: 2024-11-29 | End: 2024-11-29 | Stop reason: HOSPADM

## 2024-11-29 RX ORDER — LIDOCAINE HYDROCHLORIDE 10 MG/ML
0.5 INJECTION, SOLUTION INFILTRATION; PERINEURAL ONCE AS NEEDED
Status: DISCONTINUED | OUTPATIENT
Start: 2024-11-29 | End: 2024-11-29 | Stop reason: HOSPADM

## 2024-11-29 RX ORDER — SODIUM CHLORIDE 9 MG/ML
INJECTION, SOLUTION INTRAVENOUS AS NEEDED
Status: DISCONTINUED | OUTPATIENT
Start: 2024-11-29 | End: 2024-11-29 | Stop reason: HOSPADM

## 2024-11-29 RX ORDER — SODIUM CHLORIDE 0.9 % (FLUSH) 0.9 %
3-10 SYRINGE (ML) INJECTION AS NEEDED
Status: DISCONTINUED | OUTPATIENT
Start: 2024-11-29 | End: 2024-11-29 | Stop reason: HOSPADM

## 2024-11-29 RX ORDER — FENTANYL CITRATE 50 UG/ML
INJECTION, SOLUTION INTRAMUSCULAR; INTRAVENOUS AS NEEDED
Status: DISCONTINUED | OUTPATIENT
Start: 2024-11-29 | End: 2024-11-29 | Stop reason: SURG

## 2024-11-29 RX ORDER — FAMOTIDINE 10 MG/ML
20 INJECTION, SOLUTION INTRAVENOUS ONCE
Status: COMPLETED | OUTPATIENT
Start: 2024-11-29 | End: 2024-11-29

## 2024-11-29 RX ORDER — HYDROCODONE BITARTRATE AND ACETAMINOPHEN 7.5; 325 MG/1; MG/1
1 TABLET ORAL EVERY 4 HOURS PRN
Status: DISCONTINUED | OUTPATIENT
Start: 2024-11-29 | End: 2024-11-29 | Stop reason: HOSPADM

## 2024-11-29 RX ORDER — ROCURONIUM BROMIDE 10 MG/ML
INJECTION, SOLUTION INTRAVENOUS AS NEEDED
Status: DISCONTINUED | OUTPATIENT
Start: 2024-11-29 | End: 2024-11-29 | Stop reason: SURG

## 2024-11-29 RX ORDER — IPRATROPIUM BROMIDE AND ALBUTEROL SULFATE 2.5; .5 MG/3ML; MG/3ML
3 SOLUTION RESPIRATORY (INHALATION) ONCE AS NEEDED
Status: DISCONTINUED | OUTPATIENT
Start: 2024-11-29 | End: 2024-11-29 | Stop reason: HOSPADM

## 2024-11-29 RX ORDER — FENTANYL CITRATE 50 UG/ML
25 INJECTION, SOLUTION INTRAMUSCULAR; INTRAVENOUS
Status: DISCONTINUED | OUTPATIENT
Start: 2024-11-29 | End: 2024-11-29 | Stop reason: HOSPADM

## 2024-11-29 RX ORDER — LABETALOL HYDROCHLORIDE 5 MG/ML
5 INJECTION, SOLUTION INTRAVENOUS
Status: DISCONTINUED | OUTPATIENT
Start: 2024-11-29 | End: 2024-11-29 | Stop reason: HOSPADM

## 2024-11-29 RX ORDER — ONDANSETRON 2 MG/ML
4 INJECTION INTRAMUSCULAR; INTRAVENOUS ONCE AS NEEDED
Status: COMPLETED | OUTPATIENT
Start: 2024-11-29 | End: 2024-11-29

## 2024-11-29 RX ORDER — DIPHENHYDRAMINE HYDROCHLORIDE 50 MG/ML
12.5 INJECTION INTRAMUSCULAR; INTRAVENOUS
Status: DISCONTINUED | OUTPATIENT
Start: 2024-11-29 | End: 2024-11-29 | Stop reason: HOSPADM

## 2024-11-29 RX ORDER — PROPOFOL 10 MG/ML
INJECTION, EMULSION INTRAVENOUS AS NEEDED
Status: DISCONTINUED | OUTPATIENT
Start: 2024-11-29 | End: 2024-11-29 | Stop reason: SURG

## 2024-11-29 RX ORDER — FENTANYL CITRATE 50 UG/ML
50 INJECTION, SOLUTION INTRAMUSCULAR; INTRAVENOUS ONCE AS NEEDED
Status: DISCONTINUED | OUTPATIENT
Start: 2024-11-29 | End: 2024-11-29 | Stop reason: HOSPADM

## 2024-11-29 RX ORDER — BUPIVACAINE HYDROCHLORIDE AND EPINEPHRINE 5; 5 MG/ML; UG/ML
INJECTION, SOLUTION PERINEURAL AS NEEDED
Status: DISCONTINUED | OUTPATIENT
Start: 2024-11-29 | End: 2024-11-29 | Stop reason: HOSPADM

## 2024-11-29 RX ORDER — SODIUM CHLORIDE, SODIUM LACTATE, POTASSIUM CHLORIDE, CALCIUM CHLORIDE 600; 310; 30; 20 MG/100ML; MG/100ML; MG/100ML; MG/100ML
9 INJECTION, SOLUTION INTRAVENOUS CONTINUOUS
Status: DISCONTINUED | OUTPATIENT
Start: 2024-11-29 | End: 2024-11-29

## 2024-11-29 RX ORDER — LIDOCAINE HYDROCHLORIDE 20 MG/ML
INJECTION, SOLUTION INFILTRATION; PERINEURAL AS NEEDED
Status: DISCONTINUED | OUTPATIENT
Start: 2024-11-29 | End: 2024-11-29 | Stop reason: SURG

## 2024-11-29 RX ORDER — NALOXONE HCL 0.4 MG/ML
0.2 VIAL (ML) INJECTION AS NEEDED
Status: DISCONTINUED | OUTPATIENT
Start: 2024-11-29 | End: 2024-11-29 | Stop reason: HOSPADM

## 2024-11-29 RX ORDER — HYDROMORPHONE HYDROCHLORIDE 1 MG/ML
0.25 INJECTION, SOLUTION INTRAMUSCULAR; INTRAVENOUS; SUBCUTANEOUS
Status: DISCONTINUED | OUTPATIENT
Start: 2024-11-29 | End: 2024-11-29 | Stop reason: HOSPADM

## 2024-11-29 RX ADMIN — SODIUM CHLORIDE 75 ML/HR: 9 INJECTION, SOLUTION INTRAVENOUS at 17:49

## 2024-11-29 RX ADMIN — METOPROLOL SUCCINATE 25 MG: 25 TABLET, EXTENDED RELEASE ORAL at 08:53

## 2024-11-29 RX ADMIN — INSULIN LISPRO 2 UNITS: 100 INJECTION, SOLUTION INTRAVENOUS; SUBCUTANEOUS at 20:54

## 2024-11-29 RX ADMIN — SODIUM CHLORIDE, SODIUM LACTATE, POTASSIUM CHLORIDE, CALCIUM CHLORIDE 9 ML/HR: 20; 30; 600; 310 INJECTION, SOLUTION INTRAVENOUS at 10:49

## 2024-11-29 RX ADMIN — AMIODARONE HYDROCHLORIDE 200 MG: 200 TABLET ORAL at 08:53

## 2024-11-29 RX ADMIN — Medication 1000 UNITS: at 15:58

## 2024-11-29 RX ADMIN — LIDOCAINE HYDROCHLORIDE 5 ML: 20 INJECTION, SOLUTION INFILTRATION; PERINEURAL at 11:16

## 2024-11-29 RX ADMIN — ATORVASTATIN CALCIUM 20 MG: 20 TABLET, FILM COATED ORAL at 20:54

## 2024-11-29 RX ADMIN — OXYCODONE HYDROCHLORIDE AND ACETAMINOPHEN 250 MG: 500 TABLET ORAL at 15:58

## 2024-11-29 RX ADMIN — LINAGLIPTIN 5 MG: 5 TABLET, FILM COATED ORAL at 15:58

## 2024-11-29 RX ADMIN — FENTANYL CITRATE 25 MCG: 50 INJECTION, SOLUTION INTRAMUSCULAR; INTRAVENOUS at 11:43

## 2024-11-29 RX ADMIN — FAMOTIDINE 20 MG: 10 INJECTION INTRAVENOUS at 10:50

## 2024-11-29 RX ADMIN — Medication 1 APPLICATION: at 08:53

## 2024-11-29 RX ADMIN — TERAZOSIN 1 MG: 1 CAPSULE ORAL at 20:54

## 2024-11-29 RX ADMIN — ONDANSETRON 4 MG: 2 INJECTION INTRAMUSCULAR; INTRAVENOUS at 12:45

## 2024-11-29 RX ADMIN — SUGAMMADEX 200 MG: 100 INJECTION, SOLUTION INTRAVENOUS at 12:55

## 2024-11-29 RX ADMIN — Medication 10 ML: at 08:53

## 2024-11-29 RX ADMIN — Medication 1 APPLICATION: at 20:54

## 2024-11-29 RX ADMIN — PROPOFOL 30 MG: 10 INJECTION, EMULSION INTRAVENOUS at 11:19

## 2024-11-29 RX ADMIN — CEFOXITIN SODIUM 2000 MG: 2 POWDER, FOR SOLUTION INTRAVENOUS at 10:55

## 2024-11-29 RX ADMIN — PROPOFOL 170 MG: 10 INJECTION, EMULSION INTRAVENOUS at 11:16

## 2024-11-29 RX ADMIN — SODIUM CHLORIDE 125 MG: 9 INJECTION, SOLUTION INTRAVENOUS at 20:57

## 2024-11-29 RX ADMIN — Medication 10 ML: at 20:54

## 2024-11-29 RX ADMIN — ONDANSETRON 4 MG: 2 INJECTION, SOLUTION INTRAMUSCULAR; INTRAVENOUS at 14:00

## 2024-11-29 RX ADMIN — ROCURONIUM BROMIDE 50 MG: 10 INJECTION, SOLUTION INTRAVENOUS at 11:16

## 2024-11-29 RX ADMIN — FENTANYL CITRATE 25 MCG: 50 INJECTION, SOLUTION INTRAMUSCULAR; INTRAVENOUS at 11:13

## 2024-11-29 NOTE — PROGRESS NOTES
PROGRESS NOTE      Patient Name: Rock Maciel Jr.  : 1945  MRN: [id number]  Primary Care Physician: Denise Dickson APRN  Date of admission: 2024    Patient Care Team:  Denise Dickson APRN as PCP - General (Nurse Practitioner)  Azam Banegas Jr., MD as Consulting Physician (Cardiology)  Jamil Stevens MD as Consulting Physician (Nephrology)        Reason for Follow up:     KILLIAN on CKD       Subjective:     Patient seen and examined, comfortable, not in distress, plan for surgery today,    Review of systems:  Constitutional: weakness.  HEENT:  No headache, otalgia, itchy eyes, nasal discharge or sore throat.  Cardiac:  No chest pain, dyspnea, orthopnea or PND.  Chest:              No cough, phlegm or wheezing.  Abdomen:  abdominal pain  Neuro:  No focal weakness, abnormal movements or seizure-like activity.  :   No hematuria, no pyuria, no dysuria, no flank pain.  ROS was otherwise negative except as mentioned in the [place].       Medications:  Prior to Admission medications    Medication Sig Start Date End Date Taking? Authorizing Provider   amiodarone (PACERONE) 200 MG tablet Take 1 tablet by mouth Daily. 24  Yes Jo Toney MD   apixaban (ELIQUIS) 2.5 MG tablet tablet Take 1 tablet by mouth 2 (Two) Times a Day. Indications: Atrial Fibrillation 11/15/24  Yes Apurva Smith APRN   bumetanide (BUMEX) 1 MG tablet Take 1 tablet by mouth 2 (Two) Times a Day. 11/15/24  Yes Apurva Smith APRN   Cholecalciferol 25 MCG (1000 UT) tablet Take 1 tablet by mouth Daily.   Yes Provider, MD Shivam   glipizide (GLUCOTROL) 5 MG tablet TAKE 1 TABLET BY MOUTH 2 TIMES A DAY BEFORE MEALS. 10/29/24  Yes Denise Dickson APRN   hydrALAZINE (APRESOLINE) 25 MG tablet Take 1 tablet by mouth 3 (Three) Times a Day. 24  Yes Apurva Smith APRN   linagliptin (Tradjenta) 5 MG tablet tablet Take 1 tablet by mouth Daily. 24  Yes Denise Dickson APRN    metoprolol succinate XL (TOPROL-XL) 25 MG 24 hr tablet Take 1 tablet by mouth Daily. 11/15/24  Yes Apurva Smith APRN   vitamin C (ASCORBIC ACID) 250 MG tablet Take 1 tablet by mouth Daily.   Yes Shivam Smith MD   Zinc 50 MG tablet  2/19/22  Yes Shivam Smith MD   atorvastatin (LIPITOR) 20 MG tablet Take 1 tablet by mouth Every Night. NEED TO SEE PROVIDER FOR FUTURE REFILLS. 10/10/24   Denise Dickson APRN   clopidogrel (PLAVIX) 75 MG tablet Take 1 tablet by mouth Daily. 10/16/24   Denise Dickson APRN   glucose blood (Accu-Chek Keshia Plus) test strip USE TO TEST BLOOD SUGAR    TWICE DAILY FOR DIABETES 7/29/24   Denise Dickson APRN   nitroglycerin (NITROSTAT) 0.4 MG SL tablet Place 1 tablet under the tongue Every 5 (Five) Minutes As Needed for Chest Pain (Only if SBP Greater Than 100). Take no more than 3 doses in 15 minutes. 11/15/24   Apurva Smith APRN   terazosin (HYTRIN) 5 MG capsule TAKE 1 CAPSULE BY MOUTH EVERY DAY AT BEDTIME FOR 90 DAYS 8/1/24   ProviderShivam MD     Scheduled Meds:amiodarone, 200 mg, Oral, Daily  [Transfer Hold] vitamin C, 250 mg, Oral, Daily  [Transfer Hold] atorvastatin, 20 mg, Oral, Nightly  [Transfer Hold] cholecalciferol, 1,000 Units, Oral, Daily  [Transfer Hold] insulin lispro, 2-7 Units, Subcutaneous, 4x Daily AC & at Bedtime  [Transfer Hold] linagliptin, 5 mg, Oral, Daily  [Transfer Hold] Menthol-Zinc Oxide, 1 Application, Topical, Q12H  metoprolol succinate XL, 25 mg, Oral, Daily  [Transfer Hold] sodium chloride, 10 mL, Intravenous, Q12H  terazosin, 1 mg, Oral, Nightly      Continuous Infusions:lactated ringers, 9 mL/hr, Last Rate: 9 mL/hr (11/29/24 1049)  sodium chloride, 75 mL/hr, Last Rate: 75 mL/hr (11/28/24 1554)        PRN Meds:  [Transfer Hold] acetaminophen **OR** [Transfer Hold] acetaminophen **OR** [Transfer Hold] acetaminophen    [Transfer Hold] senna-docusate sodium **AND** [Transfer Hold] polyethylene glycol **AND**  [Transfer Hold] bisacodyl **AND** [Transfer Hold] bisacodyl    [Transfer Hold] calcium carbonate    [Transfer Hold] dextrose    [Transfer Hold] dextrose    diphenhydrAMINE    droperidol **OR** droperidol    ePHEDrine    fentanyl    flumazenil    [Transfer Hold] glucagon (human recombinant)    hydrALAZINE    HYDROcodone-acetaminophen    HYDROcodone-acetaminophen    HYDROmorphone    ipratropium-albuterol    labetalol    labetalol    naloxone    [Transfer Hold] ondansetron ODT **OR** [Transfer Hold] ondansetron    promethazine **OR** promethazine    [Transfer Hold] sodium chloride    [Transfer Hold] sodium chloride  Allergies:    Allergies   Allergen Reactions    Ceclor [Cefaclor] Rash     Rash in his 50s; he tolerated piperacillin-tazobactam in March 2020       Objective   Exam:     Vital Signs  Temp:  [97.7 °F (36.5 °C)-98.2 °F (36.8 °C)] 97.7 °F (36.5 °C)  Heart Rate:  [59-68] 60  Resp:  [16-20] 16  BP: (116-169)/(41-66) 153/46  SpO2:  [92 %-100 %] 100 %  on  Flow (L/min) (Oxygen Therapy):  [2-4] 2;   Device (Oxygen Therapy): room air  Body mass index is 36.62 kg/m².  EXAM  General:  ill appearing male in no acute distress.    Head:      Normocephalic and atraumatic.    Eyes:      PERRL/EOM intact, conjunctivae and sclerae clear without nystagmus.    Neck:      No masses, thyromegaly,  trachea central   Lungs:    Clear bilaterally to auscultation.    Heart:      Regular rate and rhythm, no murmur no gallop  Abd:        Soft, nontender, not distended, bowel sounds positive, no shifting dullness.  Msk:        No deformity or scoliosis noted of thoracic or lumbar spine.    Pulses:   Pulses normal in all 4 extremities.    Extremities:        No cyanosis or clubbing--edema.    Neuro:    No focal deficits.   alert oriented x3  Skin:       Intact without lesions or rashes.    Psych:    Alert and cooperative; normal mood and affect; normal attention span       Results Review:  I have personally reviewed most recent Data  :  [facility](creatinine:10)  CBC    Results from last 7 days   Lab Units 11/29/24  0600 11/28/24  0553 11/27/24  0742 11/26/24 0622 11/25/24  2231   WBC 10*3/mm3 5.62 5.73 5.38 9.90 11.29*   HEMOGLOBIN g/dL 8.8* 9.1* 9.2* 9.4* 10.8*   PLATELETS 10*3/mm3 254 260 262 277 304     CMP   Results from last 7 days   Lab Units 11/29/24  0600 11/28/24  0553 11/27/24  0742 11/26/24  0622 11/25/24  2231   SODIUM mmol/L 140 139 138 137 133*   POTASSIUM mmol/L 4.0 3.8 3.7 3.5 3.7   CHLORIDE mmol/L 105 102 101 95* 91*   CO2 mmol/L 28.0 29.0 29.0 30.5* 29.0   BUN mg/dL 40* 47* 52* 66* 71*   CREATININE mg/dL 2.31* 2.43* 2.24* 2.85* 3.32*   GLUCOSE mg/dL 112* 105* 87 111* 175*   ALBUMIN g/dL  --   --   --   --  4.1   BILIRUBIN mg/dL  --   --   --   --  0.5   ALK PHOS U/L  --   --   --   --  115   AST (SGOT) U/L  --   --   --   --  8   ALT (SGPT) U/L  --   --   --   --  16   LIPASE U/L  --   --   --   --  43     ABG      CT Abdomen Pelvis Without Contrast    Result Date: 11/25/2024  The patient has a colonic obstruction, with transition point noted within a left inguinal hernia sac. No pneumatosis or free air is seen at this time, although there is a small amount of free fluid which is noted within the abdomen.  Radiation dose reduction techniques were utilized, including automated exposure control and exposure modulation based on body size.   This report was finalized on 11/25/2024 11:35 PM by Dr. Inocencia Cruz M.D on Workstation: [id number]       Results for orders placed during the hospital encounter of 10/03/24    Adult Transthoracic Echo Complete W/ Cont if Necessary Per Protocol    Interpretation Summary    Left ventricular ejection fraction appears to be 61 - 65%.    Left ventricular diastolic function was indeterminate.    The left atrial cavity is moderately dilated.    Left atrial volume is moderately increased.    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    Estimated right ventricular systolic pressure  from tricuspid regurgitation is moderately elevated (45-55 mmHg).        Assessment & Plan   Assessment and Plan:         Incarcerated inguinal hernia    Essential hypertension    Type 2 diabetes mellitus with circulatory disorder, without long-term current use of insulin    Hyperlipidemia    H/O aortic valve replacement with porcine valve    S/P CABG x 2    CKD (chronic kidney disease) stage 3, GFR 30-59 ml/min    KILLIAN (acute kidney injury)    Stage 3b chronic kidney disease    Atrial fibrillation, persistent    ASSESSMENT:  KILLIAN   CKD III, with baseline creatinine ~ 2.0, secondary to diabetic and hypertensive nephropathy, creatinine 1.97 at last office appointment 07/29/2024. Prior serology negative, hepatitis profile negative, SPEP with immunofixation negative for monoclonal protein, serum free light chain ratio 1.5, serum complement C3-C4 normal, ZOIE negative, c-ANCA and p-ANCA negative. Prior urinalysis from 11/19/2024 with trace protein, UA from 11/26 with 30mg protein, large blood, 11-20 RBC. Urine osmolality 334, urine sodium 39. Prior renal US from 11/8/24 with right kidney 10.7cm, left kidney 11.9 cm, no right sided hydronephrosis, the left kidney is significantly obscured by overlying shadowing and unfortunately left-sided renal pathology cannot be excluded.   abdominal pain, reported constipation. CT AP WO with colonic obstruction, with transition point noted within a left inguinal hernia sac. No pneumatosis or free air is seen at this time, although there is a small amount of free fluid which is noted within the abdomen  Afib, on Eliquis  CAD s/p CABG  Anemia  AVR  HTN  HLD  DM    Prior echo showed EF of 61 to 65%, intermediate LV diastolic function.  Left atrial cavity and moderately dilated.       PLAN :     Renal function, improving with volume resuscitation, sCr on admit 3.3,      Kidney function fairly stable with a creatinine of 2.3, acid-base looks stable, volume looks stable, started IV fluid  yesterday, anticipating n.p.o. status, volume acceptable, no sign of volume overload, continue gentle hydration for another 24 hours, if optimal p.o. intake by tomorrow, will consider stopping this,  Electrolytes and acid/base stable   Hemoglobin stable, evident of KEE, start iv iron, 3 dose .   blood pressure Labile, but stable, avoid hypotension, pain control,  Will closely follow,  Daily weights, Strict I&O's  Avoid nephrotoxic agents including NSAIDs  We will follow and coordinate with team   Dr Chakraborty input noted.   Surgery input noted.            Lala Alarcon MD  [facility]  11/29/2024  14:21 EST

## 2024-11-29 NOTE — OP NOTE
Colorectal & General Surgery  Operative Report    Patient: Rock Maciel Jr.  YOB: 1945  MRN: [id number]  DATE OF PROCEDURE: 11/29/24     PREOPERATIVE DIAGNOSIS:  Incarcerated left inguinal hernia    POSTOPERATIVE DIAGNOSIS:  Same    PROCEDURE:  Robot-assisted laparoscopic inguinal hernia repair -left    FINDINGS:  Contents of the inguinal hernia sac and reduce.  Very large cord lipoma and indirect inguinal hernia.  Structures were isolated and protected.    SURGEON:  Erick Lazo MD    ASSISTANT:  Assistant: Livan Noonan CSA was responsible for performing the following activities: Suturing, Closing, Placing Dressing, and Held/Positioned Camera and their skilled assistance was necessary for the success of this case.     ANESTHESIA:  General-endotracheal    EBL:  5 mL    SPECIMEN:  None    OPERATIVE DESCRIPTION:  The patient was brought to the operating room under the care of the nursing staff.  The patient was placed on the operating room table in the supine position where anesthesia was induced.  The patient was then prepped and positioned in the usual sterile fashion.  A standardized timeout was then performed.    8 mm incision created in the left upper quadrant.  Veress needle inserted.  Negative aspiration test.  Abdomen insufflated to 15 mmHg.  Optical entry performed with 8 mm optical trocar.  Additional 8 mm trocars placed in the upper abdomen in the midline and in the right upper quadrant.  Patient was placed in steep Trendelenburg position.  Robot was docked.  Peritoneal flap was created.  Dissection was carried down to the level of Tristen's ligament.  The peritoneum was then dissected away from the abdominal wall and the cord structures carefully.  All hernia contents were reduced.  Peritoneum was dissected free from the cord structures completely.  Permanent mesh was then placed overlying all potential hernia spaces.  It was sutured to Tristen's ligament and on the upper aspect  of the mesh.  The mesh laid nicely with no tension or buckling.  The peritoneal flap was closed with STRATAFIX suture.  The abdomen was desufflated.  Robot was undocked.  Ports removed.  Skin was closed with 4-0 Monocryl and Exofin.    All needle, sponge, and instrument counts were correct at the end of the case.    The patient tolerated the procedure well and was transferred to the postanesthesia care unit in stable condition.    Erick Lazo M.D.  Colorectal & General Surgery  [facility]    [address] [place] [address], [address]  [place], [place], [address]  P: [phone]  F: [phone]

## 2024-11-29 NOTE — PROGRESS NOTES
[facility] Medicine Services  PROGRESS NOTE    Patient Name: Rock Maciel Jr.  : 1945  MRN: [id number]    Date of Admission: 2024  Primary Care Physician: Denise Dickson APRN    Subjective   Subjective     CC:  Follow-up hernia    Subjective:   Patient feels he is doing well today.  No further abdominal pain.  No other new complaints.  He is eager to have surgical intervention today.      Review of Systems  No current fevers or chills  No current shortness of breath or cough  No current chest pain or palpitations       Objective   Objective     Vital Signs:   Temp:  [97.7 °F (36.5 °C)-98.2 °F (36.8 °C)] 98 °F (36.7 °C)  Heart Rate:  [57-67] 67  Resp:  [16-18] 18  BP: (134-169)/(53-66) 159/57        Physical Exam:  Constitutional:Awake, alert, elderly appearing  HENT: NCAT, mucous membranes moist, neck supple  Respiratory: No cough or wheezes, normal respirations, nonlabored breathing   Cardiovascular: Pulse rate is normal, palpable radial pulses  Gastrointestinal:  soft, nontender, nondistended  Musculoskeletal: Somewhat frail appearance, no lower extremity edema, BMI 34 obese  Psychiatric: Appropriate affect, cooperative, conversational  Neurologic: No slurred speech or facial droop, follows commands  Skin: No rashes or jaundice, warm      Results Reviewed:  Results from last 7 days   Lab Units 24  0600 24  0553 24  0742   WBC 10*3/mm3 5.62 5.73 5.38   HEMOGLOBIN g/dL 8.8* 9.1* 9.2*   HEMATOCRIT % 28.5* 28.6* 28.6*   PLATELETS 10*3/mm3 254 260 262     Results from last 7 days   Lab Units 24  0600 24  0553 24  0742 24  0622 24  2231   SODIUM mmol/L 140 139 138   < > 133*   POTASSIUM mmol/L 4.0 3.8 3.7   < > 3.7   CHLORIDE mmol/L 105 102 101   < > 91*   CO2 mmol/L 28.0 29.0 29.0   < > 29.0   BUN mg/dL 40* 47* 52*   < > 71*   CREATININE mg/dL 2.31* 2.43* 2.24*   < > 3.32*   GLUCOSE mg/dL 112* 105* 87   < > 175*   CALCIUM mg/dL 8.2* 8.3* 8.4*    < > 9.5   ALK PHOS U/L  --   --   --   --  115   ALT (SGPT) U/L  --   --   --   --  16   AST (SGOT) U/L  --   --   --   --  8    < > = values in this interval not displayed.     Estimated Creatinine Clearance: 35.5 mL/min (A) (by C-G formula based on SCr of 2.31 mg/dL (H)).    Microbiology Results Abnormal       None            Imaging Results (Last 24 Hours)       ** No results found for the last 24 hours. **            Results for orders placed during the hospital encounter of 10/03/24    Adult Transthoracic Echo Complete W/ Cont if Necessary Per Protocol    Interpretation Summary    Left ventricular ejection fraction appears to be 61 - 65%.    Left ventricular diastolic function was indeterminate.    The left atrial cavity is moderately dilated.    Left atrial volume is moderately increased.    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    Estimated right ventricular systolic pressure from tricuspid regurgitation is moderately elevated (45-55 mmHg).      I have reviewed the medications:  Scheduled Meds:amiodarone, 200 mg, Oral, Daily  [Transfer Hold] vitamin C, 250 mg, Oral, Daily  [Transfer Hold] atorvastatin, 20 mg, Oral, Nightly  ceFOXitin, 2,000 mg, Intravenous, Once  [Transfer Hold] cholecalciferol, 1,000 Units, Oral, Daily  [Transfer Hold] insulin lispro, 2-7 Units, Subcutaneous, 4x Daily AC & at Bedtime  [Transfer Hold] linagliptin, 5 mg, Oral, Daily  [Transfer Hold] Menthol-Zinc Oxide, 1 Application, Topical, Q12H  metoprolol succinate XL, 25 mg, Oral, Daily  [Transfer Hold] sodium chloride, 10 mL, Intravenous, Q12H  sodium chloride, 3 mL, Intravenous, Q12H  terazosin, 1 mg, Oral, Nightly      Continuous Infusions:lactated ringers, 9 mL/hr, Last Rate: 9 mL/hr (11/29/24 1049)  sodium chloride, 75 mL/hr, Last Rate: 75 mL/hr (11/28/24 1554)      PRN Meds:.  [Transfer Hold] acetaminophen **OR** [Transfer Hold] acetaminophen **OR** [Transfer Hold] acetaminophen    [Transfer Hold] senna-docusate sodium  **AND** [Transfer Hold] polyethylene glycol **AND** [Transfer Hold] bisacodyl **AND** [Transfer Hold] bisacodyl    [Transfer Hold] calcium carbonate    [Transfer Hold] dextrose    [Transfer Hold] dextrose    fentanyl    [Transfer Hold] glucagon (human recombinant)    labetalol    lidocaine    [Transfer Hold] ondansetron ODT **OR** [Transfer Hold] ondansetron    [Transfer Hold] sodium chloride    sodium chloride    [Transfer Hold] sodium chloride    Assessment & Plan   Assessment & Plan     Active Hospital Problems    Diagnosis  POA    **Incarcerated inguinal hernia [K40.30]  Yes    Atrial fibrillation, persistent [I48.19]  Yes    Stage 3b chronic kidney disease [N18.32]  Yes    KILLIAN (acute kidney injury) [N17.9]  Yes    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        Brief Hospital Course to date:  Rock Maciel Jr. is a 79 y.o. male presents to the hospital with incarcerated inguinal hernia and general surgery team was able to reduce the hernia in the emergency room.  Patient was also found to have acute kidney injury on CKD 3B.    Discussion/plan for today:  Comments hemoglobin slightly lower today.  Likely related to dilution as patient received IV fluid yesterday while NPO.      Kidney function slightly improved but is somewhat labile.  Gentle IV fluid perioperative per nephrology recommendations.    Discussed with patient regarding his stability and his wheelchair at home is currently nonfunctional.  He has requested a new wheelchair and I have ordered this through case management.  He is hoping to have this delivered to his  house.     Continue amiodarone for atrial fibrillation.  Hold anticoagulation for surgical intervention.  Plan discussed with surgeon perioperatively to determine appropriate time to restart anticoagulation.      Appreciate nephrology recommendations regarding KILLIAN and CKD.  Monitor blood pressure and continue current medications and adjust as needed.  Alpha-blocker initially was  decreased while patient is acutely ill.      Tradjenta for diabetes.  Glucose reviewed.  Plan for correction insulin for now.      PT for debility recommending home with home health.      Wound care recommend Calmoseptine to coccyx/buttock partial skin loss mentioned.  Monitoring.  Atorvastatin for hyperlipidemia.  Case discussed with surgery earlier and they are planning on surgical intervention for hernia possibly today or tomorrow.  Plan discussed with surgery further.  Treatment plan discussed with patient is in agreement.    DVT Prophylaxis: Mechanical      Disposition: Most likely home following surgery    CODE STATUS:   Code Status and Medical Interventions: CPR (Attempt to Resuscitate); Full Support   Ordered at: 11/26/24 0157     Code Status (Patient has no pulse and is not breathing):    CPR (Attempt to Resuscitate)     Medical Interventions (Patient has pulse or is breathing):    Full Support       Bihsop Chakraborty MD  11/29/24

## 2024-11-29 NOTE — CASE MANAGEMENT/SOCIAL WORK
Continued Stay Note  [facility]     Patient Name: Rock Maciel Jr.  MRN: [id number]  Today's Date: 11/29/2024    Admit Date: 11/25/2024    Plan: Home w/ HH - pending how he does after surgery   Discharge Plan       Row Name 11/29/24 0852       Plan    Plan Home w/ HH - pending how he does after surgery    Plan Comments Clinical chart reviewed; plan for surgery today. Patient walked 300ft with PT on 11/27 and they are recommending home health. [facility] referral is pending, Leana/[facility] is following to see when patient will be ready for dc. Patient would like new wheelchair at dc - CCP following to notify [facility] when patient is ready for dc. CCP to follow up with patient post op for any further dc needs. MAYANK KIMBALL                   Discharge Codes    No documentation.                 Expected Discharge Date and Time       Expected Discharge Date Expected Discharge Time    Dec 2, 2024               MAYANK Hung

## 2024-11-29 NOTE — ANESTHESIA PREPROCEDURE EVALUATION
Anesthesia Evaluation     Patient summary reviewed                Airway   Mallampati: II  Possible difficult intubation  Dental - normal exam     Pulmonary - normal exam   (+) ,sleep apnea on CPAP  (-) not a smoker  Cardiovascular - normal exam    ECG reviewed  PT is on anticoagulation therapy  Patient on routine beta blocker and Beta blocker given within 24 hours of surgery    (+) hypertension, valvular problems/murmurs murmur, CABG, dysrhythmias Paroxysmal Atrial Fib, CHF , hyperlipidemia,  carotid artery disease      Neuro/Psych- negative ROS  GI/Hepatic/Renal/Endo    (+) obesity, hiatal hernia, renal disease- CRI, diabetes mellitus type 2    Musculoskeletal     Abdominal    Substance History      OB/GYN          Other                    Anesthesia Plan    ASA 4     general     (I have reviewed all pertinent information including medical history,allergies, imaging, studies and laboratory results. I have explained alternatives, risks and benefits to anesthesia, including but not limited to; dental damage, sore throat, nausea, vomiting, aspiration,MI,stroke and death. Questions answered. Patient has agreed to proceed. )    Anesthetic plan, risks, benefits, and alternatives have been provided, discussed and informed consent has been obtained with: patient.    CODE STATUS:    Code Status (Patient has no pulse and is not breathing): CPR (Attempt to Resuscitate)  Medical Interventions (Patient has pulse or is breathing): Full Support

## 2024-11-29 NOTE — ANESTHESIA POSTPROCEDURE EVALUATION
Patient: Rock Maciel Jr.    Procedure Summary       Date: 11/29/24 Room / Location: [facility] OR [address] [place] [address] MAIN OR    Anesthesia Start: 1100 Anesthesia Stop: 1314    Procedure: INGUINAL HERNIA REPAIR LAPAROSCOPIC WITH DAVINCI ROBOT (Left: Abdomen) Diagnosis:       Incarcerated inguinal hernia      (Incarcerated inguinal hernia [K40.30])    Surgeons: Phong Lazo MD Provider: Charbel Gaviria MD    Anesthesia Type: general ASA Status: 4            Anesthesia Type: general    Vitals  Vitals Value Taken Time   /56 11/29/24 1430   Temp 36.5 °C (97.7 °F) 11/29/24 1310   Pulse 66 11/29/24 1434   Resp 18 11/29/24 1430   SpO2 93 % 11/29/24 1434   Vitals shown include unfiled device data.        Post Anesthesia Care and Evaluation    Patient location during evaluation: PACU  Patient participation: complete - patient participated  Level of consciousness: awake and alert  Pain management: adequate    Airway patency: patent  Anesthetic complications: No anesthetic complications  PONV Status: none  Cardiovascular status: acceptable and hemodynamically stable  Respiratory status: acceptable, nonlabored ventilation and spontaneous ventilation  Hydration status: acceptable

## 2024-11-29 NOTE — PLAN OF CARE
Goal Outcome Evaluation:         Pt A&O X4, VSS so far this shift. Ambulated well w/walker. No acute distress noted so far this shift. NPO since midnight in preparation for inguinal hernia repair surgery today.Will continue to assist and monitor for remainder of shift and f/up w/ on-coming RN.

## 2024-11-29 NOTE — ANESTHESIA PROCEDURE NOTES
Airway  Urgency: elective    Date/Time: 11/29/2024 11:20 AM  Airway not difficult    General Information and Staff    Patient location during procedure: OR  Anesthesiologist: Charbel Gaviria MD  CRNA/CAA: Blaine Aldana CRNA    Indications and Patient Condition  Indications for airway management: airway protection    Preoxygenated: yes  Mask difficulty assessment: 1 - vent by mask    Final Airway Details  Final airway type: endotracheal airway      Successful airway: ETT  Cuffed: yes   Successful intubation technique: direct laryngoscopy  Facilitating devices/methods: intubating stylet  Endotracheal tube insertion site: oral  Blade: Brenner  Blade size: 2  ETT size (mm): 7.5  Cormack-Lehane Classification: grade I - full view of glottis  Placement verified by: chest auscultation and capnometry   Measured from: lips  ETT/EBT  to lips (cm): 2  Number of attempts at approach: 1  Assessment: lips, teeth, and gum same as pre-op and atraumatic intubation    Additional Comments  Unable to visualize cords with brenner 2, full view of cords with CMAC D blade

## 2024-11-30 ENCOUNTER — HOME HEALTH ADMISSION (OUTPATIENT)
Dept: HOME HEALTH SERVICES | Facility: HOME HEALTHCARE | Age: 79
End: 2024-11-30
Payer: COMMERCIAL

## 2024-11-30 ENCOUNTER — DOCUMENTATION (OUTPATIENT)
Dept: HOME HEALTH SERVICES | Facility: HOME HEALTHCARE | Age: 79
End: 2024-11-30
Payer: COMMERCIAL

## 2024-11-30 ENCOUNTER — READMISSION MANAGEMENT (OUTPATIENT)
Dept: CALL CENTER | Facility: HOSPITAL | Age: 79
End: 2024-11-30
Payer: MEDICARE

## 2024-11-30 VITALS
RESPIRATION RATE: 19 BRPM | DIASTOLIC BLOOD PRESSURE: 51 MMHG | BODY MASS INDEX: 36.99 KG/M2 | SYSTOLIC BLOOD PRESSURE: 135 MMHG | OXYGEN SATURATION: 97 % | WEIGHT: 279.1 LBS | HEART RATE: 61 BPM | HEIGHT: 73 IN | TEMPERATURE: 98.4 F

## 2024-11-30 PROBLEM — K40.30 INCARCERATED INGUINAL HERNIA: Status: RESOLVED | Noted: 2024-11-26 | Resolved: 2024-11-30

## 2024-11-30 PROBLEM — N17.9 AKI (ACUTE KIDNEY INJURY): Status: RESOLVED | Noted: 2020-03-26 | Resolved: 2024-11-30

## 2024-11-30 LAB
ANION GAP SERPL CALCULATED.3IONS-SCNC: 7.6 MMOL/L (ref 5–15)
BUN SERPL-MCNC: 32 MG/DL (ref 8–23)
BUN/CREAT SERPL: 16.4 (ref 7–25)
CALCIUM SPEC-SCNC: 8 MG/DL (ref 8.6–10.5)
CHLORIDE SERPL-SCNC: 107 MMOL/L (ref 98–107)
CO2 SERPL-SCNC: 27.4 MMOL/L (ref 22–29)
CREAT SERPL-MCNC: 1.95 MG/DL (ref 0.76–1.27)
DEPRECATED RDW RBC AUTO: 47 FL (ref 37–54)
EGFRCR SERPLBLD CKD-EPI 2021: 34.1 ML/MIN/1.73
ERYTHROCYTE [DISTWIDTH] IN BLOOD BY AUTOMATED COUNT: 13.7 % (ref 12.3–15.4)
GLUCOSE BLDC GLUCOMTR-MCNC: 104 MG/DL (ref 70–130)
GLUCOSE BLDC GLUCOMTR-MCNC: 184 MG/DL (ref 70–130)
GLUCOSE SERPL-MCNC: 101 MG/DL (ref 65–99)
HCT VFR BLD AUTO: 29.1 % (ref 37.5–51)
HGB BLD-MCNC: 8.5 G/DL (ref 13–17.7)
MCH RBC QN AUTO: 27.5 PG (ref 26.6–33)
MCHC RBC AUTO-ENTMCNC: 29.2 G/DL (ref 31.5–35.7)
MCV RBC AUTO: 94.2 FL (ref 79–97)
PLATELET # BLD AUTO: 245 10*3/MM3 (ref 140–450)
PMV BLD AUTO: 10 FL (ref 6–12)
POTASSIUM SERPL-SCNC: 4.7 MMOL/L (ref 3.5–5.2)
RBC # BLD AUTO: 3.09 10*6/MM3 (ref 4.14–5.8)
SODIUM SERPL-SCNC: 142 MMOL/L (ref 136–145)
WBC NRBC COR # BLD AUTO: 6.4 10*3/MM3 (ref 3.4–10.8)

## 2024-11-30 PROCEDURE — 85027 COMPLETE CBC AUTOMATED: CPT | Performed by: SURGERY

## 2024-11-30 PROCEDURE — 80048 BASIC METABOLIC PNL TOTAL CA: CPT | Performed by: SURGERY

## 2024-11-30 PROCEDURE — 25010000002 NA FERRIC GLUC CPLX PER 12.5 MG: Performed by: INTERNAL MEDICINE

## 2024-11-30 PROCEDURE — 82948 REAGENT STRIP/BLOOD GLUCOSE: CPT

## 2024-11-30 PROCEDURE — 99024 POSTOP FOLLOW-UP VISIT: CPT | Performed by: SURGERY

## 2024-11-30 RX ORDER — ACETAMINOPHEN 325 MG/1
650 TABLET ORAL EVERY 6 HOURS PRN
Start: 2024-11-30

## 2024-11-30 RX ORDER — TERAZOSIN 2 MG/1
2 CAPSULE ORAL NIGHTLY
Qty: 30 CAPSULE | Refills: 0 | Status: SHIPPED | OUTPATIENT
Start: 2024-11-30

## 2024-11-30 RX ADMIN — METOPROLOL SUCCINATE 25 MG: 25 TABLET, EXTENDED RELEASE ORAL at 09:43

## 2024-11-30 RX ADMIN — Medication 1 APPLICATION: at 09:48

## 2024-11-30 RX ADMIN — Medication 10 ML: at 09:48

## 2024-11-30 RX ADMIN — ACETAMINOPHEN 650 MG: 325 TABLET ORAL at 09:42

## 2024-11-30 RX ADMIN — OXYCODONE HYDROCHLORIDE AND ACETAMINOPHEN 250 MG: 500 TABLET ORAL at 09:43

## 2024-11-30 RX ADMIN — SODIUM CHLORIDE 125 MG: 9 INJECTION, SOLUTION INTRAVENOUS at 10:48

## 2024-11-30 RX ADMIN — AMIODARONE HYDROCHLORIDE 200 MG: 200 TABLET ORAL at 09:43

## 2024-11-30 RX ADMIN — Medication 1000 UNITS: at 09:43

## 2024-11-30 NOTE — PROGRESS NOTES
[facility] to follow for home health needs.  Patient D/Cing later today.  Spoke with patient and verified all info on facesheet.  Prefers afternoon visits.  States has had our services in the past.  Is agreeable to home health PT eval.  Confirmed PCP and recent visit noted on 11/19.  May accept orders from Dr Lazo, surgery as needed.

## 2024-11-30 NOTE — DISCHARGE INSTRUCTIONS
POST OP RECOMMENDATIONS  Dr. Erick Lazo  [phone]  Discharge Instructions    Activity  No lifting more than 10 pounds for 6 weeks  Diet  Regular diet as tolerated  Dressings  The glue on your incisions will fall off on its own in 1-2 weeks.  You do not need to pack any of your incisions  Bathing  It's ok to shower anytime.   Do not submerge your incisions (bath, pool, lake, ocean, etc.) for 3 weeks.  You can wash your incisions with warm soapy water very gently and pat dry  Pain Management  Unless you have been told otherwise, it's ok to take Tylenol 1000 mg every 6 hours as needed.  I have provided you a prescription for a narcotic pain medication. Take this as needed.   I have also provided you a muscle relaxer if you were still using it in the hospital. Take this scheduled for a couple days, and then you can transition to taking it only as needed.   Please call the office if you have intense pain that is not relieved.   Blood clot prevention  You should be up walking multiple times each day to prevent blood clots from forming.   Driving  Do not drive while taking narcotic pain medications.   Before driving, make sure that you are able to move and rotate appropriately to keep you and the rest of us safe on the road.   Follow up appointment  My office will call you with a follow up appointment if you do not have one already. It will be in 2 weeks.   If you do not hear from my office in 1 week, please call [phone] to ask about scheduling.   Remember to contact me for any of the following:   Fever > 101 degrees  Severe pain that cannot be controlled by taking your pain pills  Severe nausea or vomiting that cannot be controlled by taking your nausea pills  Significant bleeding of your incisions  Drainage that has a bad smell or is yellow or green in appearance  Any other questions or concerns

## 2024-11-30 NOTE — OUTREACH NOTE
Prep Survey      Flowsheet Row Responses   [facility] discharged [facility]? [place]   Is LACE score < 7 ? No   Eligibility [facility]   Date of Admission 11/25/24   Date of Discharge 11/30/24   Discharge Disposition Home or Self Care   Discharge diagnosis Inquinal hernia repair   Does the patient have one of the following disease processes/diagnoses(primary or secondary)? General Surgery   Does the patient have Home health ordered? Yes   What is the Home health agency?  [facility]   Is there a DME ordered? Yes   What DME was ordered? [employer] for new wheelchair   Prep survey completed? Yes            MOY ESTEBAN - Registered Nurse

## 2024-11-30 NOTE — PLAN OF CARE
Problem: Adult Inpatient Plan of Care  Goal: Plan of Care Review  Recent Flowsheet Documentation  Taken 11/30/2024 0353 by Atul Hernadez, RN  Progress: improving  Outcome Evaluation: No change. VSS. AOx4. No c/o pain or discomfort. O2 in use @ 2L via NC @ night. RA O2 during day. Lap sites c/d/i. Makes needs known. Call light within reach.  Plan of Care Reviewed With: patient  Goal: Absence of Hospital-Acquired Illness or Injury  Intervention: Identify and Manage Fall Risk  Recent Flowsheet Documentation  Taken 11/30/2024 0247 by Atul Hernadez, RN  Safety Promotion/Fall Prevention:   activity supervised   assistive device/personal items within reach   clutter free environment maintained   fall prevention program maintained   lighting adjusted   nonskid shoes/slippers when out of bed   room organization consistent   safety round/check completed  Taken 11/30/2024 0055 by Atul Hernadez, GABO  Safety Promotion/Fall Prevention:   activity supervised   assistive device/personal items within reach   clutter free environment maintained   fall prevention program maintained   lighting adjusted   nonskid shoes/slippers when out of bed   room organization consistent   safety round/check completed  Taken 11/29/2024 2255 by Atul Hernadez, RN  Safety Promotion/Fall Prevention:   activity supervised   assistive device/personal items within reach   clutter free environment maintained   fall prevention program maintained   lighting adjusted   nonskid shoes/slippers when out of bed   room organization consistent   safety round/check completed  Taken 11/29/2024 2058 by Atul Hernadez, RN  Safety Promotion/Fall Prevention:   activity supervised   assistive device/personal items within reach   clutter free environment maintained   fall prevention program maintained   lighting adjusted   nonskid shoes/slippers when out of bed   room organization consistent   safety round/check completed  Intervention: Prevent Skin Injury  Recent Flowsheet  Documentation  Taken 11/30/2024 0247 by Atul Hernadez RN  Body Position:   tilted   right  Taken 11/30/2024 0055 by Atul Hernadez RN  Body Position: supine  Taken 11/29/2024 2255 by Atul Hernadez RN  Body Position:   side-lying   right  Taken 11/29/2024 2058 by Atul Hernadez RN  Body Position: sitting up in bed  Intervention: Prevent and Manage VTE (Venous Thromboembolism) Risk  Recent Flowsheet Documentation  Taken 11/29/2024 2058 by Atul Hernadez RN  VTE Prevention/Management:   bilateral   SCDs (sequential compression devices) on  Goal: Optimal Comfort and Wellbeing  Intervention: Provide Person-Centered Care  Recent Flowsheet Documentation  Taken 11/30/2024 0055 by Atul Hernadez RN  Trust Relationship/Rapport:   care explained   choices provided   emotional support provided   empathic listening provided   questions answered   questions encouraged   reassurance provided   thoughts/feelings acknowledged  Taken 11/29/2024 2058 by Atul Hernadez RN  Trust Relationship/Rapport:   care explained   choices provided   emotional support provided   empathic listening provided   questions answered   questions encouraged   reassurance provided   thoughts/feelings acknowledged     Problem: Fall Injury Risk  Goal: Absence of Fall and Fall-Related Injury  Intervention: Promote Injury-Free Environment  Recent Flowsheet Documentation  Taken 11/30/2024 0247 by Autl Hernadez RN  Safety Promotion/Fall Prevention:   activity supervised   assistive device/personal items within reach   clutter free environment maintained   fall prevention program maintained   lighting adjusted   nonskid shoes/slippers when out of bed   room organization consistent   safety round/check completed  Taken 11/30/2024 0055 by Atul Hernadez RN  Safety Promotion/Fall Prevention:   activity supervised   assistive device/personal items within reach   clutter free environment maintained   fall prevention program maintained   lighting adjusted   nonskid shoes/slippers when out of  bed   room organization consistent   safety round/check completed  Taken 11/29/2024 2255 by Atul Hernadez, RN  Safety Promotion/Fall Prevention:   activity supervised   assistive device/personal items within reach   clutter free environment maintained   fall prevention program maintained   lighting adjusted   nonskid shoes/slippers when out of bed   room organization consistent   safety round/check completed  Taken 11/29/2024 2058 by Atul Hernadez, RN  Safety Promotion/Fall Prevention:   activity supervised   assistive device/personal items within reach   clutter free environment maintained   fall prevention program maintained   lighting adjusted   nonskid shoes/slippers when out of bed   room organization consistent   safety round/check completed     Problem: Skin Injury Risk Increased  Goal: Skin Health and Integrity  Intervention: Optimize Skin Protection  Recent Flowsheet Documentation  Taken 11/30/2024 0247 by Atul Hernadez, RN  Activity Management: activity encouraged  Head of Bed (HOB) Positioning: HOB elevated  Taken 11/30/2024 0055 by Atul Hernadez, RN  Activity Management: activity encouraged  Head of Bed (HOB) Positioning:   HOB elevated   HOB at 20-30 degrees  Taken 11/29/2024 2255 by Atul Hernadez, RN  Activity Management: activity encouraged  Head of Bed (HOB) Positioning: HOB elevated  Taken 11/29/2024 2058 by Atul Hernadez, RN  Activity Management: activity encouraged  Head of Bed (HOB) Positioning:   HOB elevated   HOB at 20-30 degrees     Problem: Hernia Repair  Goal: Anesthesia/Sedation Recovery  Intervention: Optimize Anesthesia Recovery  Recent Flowsheet Documentation  Taken 11/30/2024 0247 by Atul Hernadez, RN  Safety Promotion/Fall Prevention:   activity supervised   assistive device/personal items within reach   clutter free environment maintained   fall prevention program maintained   lighting adjusted   nonskid shoes/slippers when out of bed   room organization consistent   safety round/check completed  Taken  11/30/2024 0055 by Atul Hernadez RN  Safety Promotion/Fall Prevention:   activity supervised   assistive device/personal items within reach   clutter free environment maintained   fall prevention program maintained   lighting adjusted   nonskid shoes/slippers when out of bed   room organization consistent   safety round/check completed  Taken 11/29/2024 2255 by Atul Hernadez RN  Safety Promotion/Fall Prevention:   activity supervised   assistive device/personal items within reach   clutter free environment maintained   fall prevention program maintained   lighting adjusted   nonskid shoes/slippers when out of bed   room organization consistent   safety round/check completed  Taken 11/29/2024 2058 by Atul Hernadez RN  Safety Promotion/Fall Prevention:   activity supervised   assistive device/personal items within reach   clutter free environment maintained   fall prevention program maintained   lighting adjusted   nonskid shoes/slippers when out of bed   room organization consistent   safety round/check completed  Goal: Optimal Pain Control and Function  Intervention: Prevent or Manage Pain  Recent Flowsheet Documentation  Taken 11/30/2024 0055 by Atul Hernadez RN  Diversional Activities:   smartphone   television  Taken 11/29/2024 2058 by Atul eHrnadez RN  Diversional Activities:   smartphone   television  Goal: Effective Oxygenation and Ventilation  Intervention: Optimize Oxygenation and Ventilation  Recent Flowsheet Documentation  Taken 11/30/2024 0247 by Atul Hernadez RN  Head of Bed (HOB) Positioning: HOB elevated  Taken 11/30/2024 0055 by Atul Hernadez RN  Head of Bed (HOB) Positioning:   HOB elevated   HOB at 20-30 degrees  Taken 11/29/2024 2255 by Atul Hernadez RN  Head of Bed (HOB) Positioning: HOB elevated  Taken 11/29/2024 2058 by Atul Hernadez RN  Head of Bed (HOB) Positioning:   HOB elevated   HOB at 20-30 degrees     Problem: Pain Acute  Goal: Optimal Pain Control and Function  Intervention: Optimize Psychosocial  Wellbeing  Flowsheets  Taken 11/30/2024 0055  Diversional Activities:   smartphone   television  Taken 11/29/2024 2058  Supportive Measures:   active listening utilized   relaxation techniques promoted  Diversional Activities:   smartphone   television  Intervention: Develop Pain Management Plan  Flowsheets (Taken 11/29/2024 1430 by Yenny Zaman RN)  Pain Management Interventions:   medication offered but refused   position adjusted   quiet environment facilitated   relaxation techniques promoted  Intervention: Prevent or Manage Pain  Flowsheets  Taken 11/30/2024 0055 by Atul Hernadez RN  Sensory Stimulation Regulation:   care clustered   lighting decreased  Taken 11/29/2024 2058 by Atul Hernadez RN  Sensory Stimulation Regulation:   care clustered   lighting decreased  Sleep/Rest Enhancement:   awakenings minimized   relaxation techniques promoted  Taken 11/29/2024 1800 by Jennifer Boyce RN  Medication Review/Management: medications reviewed     Problem: Surgery Nonspecified  Goal: Blood Glucose Level Within Target Range  Intervention: Optimize Glycemic Control  Flowsheets (Taken 11/29/2024 1500 by Jennifer Boyce RN)  Hypoglycemia Management: blood glucose monitored  Goal: Optimal Pain Control and Function  Intervention: Prevent or Manage Pain  Flowsheets  Taken 11/30/2024 0055 by Atul Hernadez RN  Diversional Activities:   smartphone   television  Taken 11/29/2024 2058 by Atul Hernadez RN  Diversional Activities:   smartphone   television  Taken 11/29/2024 1430 by Yenny Zaman RN  Pain Management Interventions:   medication offered but refused   position adjusted   quiet environment facilitated   relaxation techniques promoted  Goal: Effective Oxygenation and Ventilation  Intervention: Optimize Oxygenation and Ventilation  Flowsheets  Taken 11/30/2024 0345 by Steffi Lamb PCT  Activity Management: activity encouraged  Head of Bed (HOB) Positioning: HOB at 20-30 degrees  Taken 11/30/2024 0247 by Atul Hernadez  RN  Activity Management: activity encouraged  Head of Bed (HOB) Positioning: HOB elevated  Taken 11/30/2024 0055 by Atul Hernadez RN  Activity Management: activity encouraged  Head of Bed (HOB) Positioning:   HOB elevated   HOB at 20-30 degrees  Taken 11/29/2024 2255 by Atul Hernadez RN  Activity Management: activity encouraged  Head of Bed (HOB) Positioning: HOB elevated  Taken 11/29/2024 2058 by Atul Hernadez RN  Activity Management: activity encouraged  Head of Bed (HOB) Positioning:   HOB elevated   HOB at 20-30 degrees  Cough And Deep Breathing: done independently per patient     Problem: Fall Injury Risk  Goal: Absence of Fall and Fall-Related Injury  Intervention: Identify and Manage Contributors  Flowsheets (Taken 11/29/2024 1800 by Jennifer Boyec RN)  Medication Review/Management: medications reviewed  Intervention: Promote Injury-Free Environment  Flowsheets  Taken 11/30/2024 0345 by Steffi Lamb PCT  Safety Promotion/Fall Prevention:   safety round/check completed   assistive device/personal items within reach  Taken 11/30/2024 0247 by Atul Hernadez RN  Safety Promotion/Fall Prevention:   activity supervised   assistive device/personal items within reach   clutter free environment maintained   fall prevention program maintained   lighting adjusted   nonskid shoes/slippers when out of bed   room organization consistent   safety round/check completed  Taken 11/30/2024 0055 by Atul Hernadez RN  Safety Promotion/Fall Prevention:   activity supervised   assistive device/personal items within reach   clutter free environment maintained   fall prevention program maintained   lighting adjusted   nonskid shoes/slippers when out of bed   room organization consistent   safety round/check completed  Taken 11/29/2024 2255 by Atul Hernadez, RN  Safety Promotion/Fall Prevention:   activity supervised   assistive device/personal items within reach   clutter free environment maintained   fall prevention program maintained    lighting adjusted   nonskid shoes/slippers when out of bed   room organization consistent   safety round/check completed  Taken 11/29/2024 2058 by Atul Hernadez, RN  Safety Promotion/Fall Prevention:   activity supervised   assistive device/personal items within reach   clutter free environment maintained   fall prevention program maintained   lighting adjusted   nonskid shoes/slippers when out of bed   room organization consistent   safety round/check completed     Problem: Skin Injury Risk Increased  Goal: Skin Health and Integrity  Intervention: Optimize Skin Protection  Flowsheets  Taken 11/30/2024 0345 by Steffi Lamb PCT  Activity Management: activity encouraged  Head of Bed (HOB) Positioning: HOB at 20-30 degrees  Taken 11/30/2024 0247 by Atul Hernadez, RN  Activity Management: activity encouraged  Head of Bed (HOB) Positioning: HOB elevated  Taken 11/30/2024 0055 by Atul Hernadez, RN  Activity Management: activity encouraged  Head of Bed (HOB) Positioning:   HOB elevated   HOB at 20-30 degrees  Taken 11/29/2024 2255 by Atul Hernadez, RN  Activity Management: activity encouraged  Head of Bed (HOB) Positioning: HOB elevated  Taken 11/29/2024 2058 by Atul Hernadez, RN  Activity Management: activity encouraged  Head of Bed (HOB) Positioning:   HOB elevated   HOB at 20-30 degrees  Taken 11/29/2024 1500 by Jennifer Boyce RN  Pressure Reduction Techniques:   frequent weight shift encouraged   weight shift assistance provided  Pressure Reduction Devices:   heel offloading device utilized   positioning supports utilized  Taken 11/29/2024 0800 by Jennifer Boyce, RN  Skin Protection: transparent dressing maintained     Problem: Comorbidity Management  Goal: Blood Glucose Level Within Target Range  Intervention: Monitor and Manage Glycemia  Flowsheets (Taken 11/29/2024 1800 by Jennifer Boyce, RN)  Medication Review/Management: medications reviewed  Goal: Blood Pressure in Desired Range  Intervention: Maintain Blood Pressure  Management  Flowsheets (Taken 11/29/2024 1800 by Jennifer Boyce, RN)  Medication Review/Management: medications reviewed     Problem: Adult Inpatient Plan of Care  Goal: Plan of Care Review  Flowsheets (Taken 11/30/2024 0353)  Progress: improving  Outcome Evaluation: No change. VSS. AOx4. No c/o pain or discomfort. O2 in use @ 2L via NC @ night. RA O2 during day. Lap sites c/d/i. Makes needs known. Call light within reach.  Plan of Care Reviewed With: patient  Goal: Absence of Hospital-Acquired Illness or Injury  Intervention: Identify and Manage Fall Risk  Flowsheets  Taken 11/30/2024 0345 by Steffi Lamb PCT  Safety Promotion/Fall Prevention:   safety round/check completed   assistive device/personal items within reach  Taken 11/30/2024 0247 by Atul Hernadez, RN  Safety Promotion/Fall Prevention:   activity supervised   assistive device/personal items within reach   clutter free environment maintained   fall prevention program maintained   lighting adjusted   nonskid shoes/slippers when out of bed   room organization consistent   safety round/check completed  Taken 11/30/2024 0055 by Atul Hernadez, RN  Safety Promotion/Fall Prevention:   activity supervised   assistive device/personal items within reach   clutter free environment maintained   fall prevention program maintained   lighting adjusted   nonskid shoes/slippers when out of bed   room organization consistent   safety round/check completed  Taken 11/29/2024 2255 by Atul Hernadez, RN  Safety Promotion/Fall Prevention:   activity supervised   assistive device/personal items within reach   clutter free environment maintained   fall prevention program maintained   lighting adjusted   nonskid shoes/slippers when out of bed   room organization consistent   safety round/check completed  Taken 11/29/2024 2058 by Atul Hernadez, RN  Safety Promotion/Fall Prevention:   activity supervised   assistive device/personal items within reach   clutter free environment maintained    fall prevention program maintained   lighting adjusted   nonskid shoes/slippers when out of bed   room organization consistent   safety round/check completed  Intervention: Prevent Skin Injury  Flowsheets  Taken 11/30/2024 0345 by Steffi Lamb PCT  Body Position: supine  Taken 11/30/2024 0247 by Atul Hernadez RN  Body Position:   tilted   right  Taken 11/30/2024 0055 by Atul Hernadez RN  Body Position: supine  Taken 11/29/2024 2255 by Atul Hernadez RN  Body Position:   side-lying   right  Taken 11/29/2024 2058 by Atul Hernadez RN  Body Position: sitting up in bed  Taken 11/29/2024 0800 by Jennifer Boyce RN  Skin Protection: transparent dressing maintained  Intervention: Prevent and Manage VTE (Venous Thromboembolism) Risk  Flowsheets (Taken 11/29/2024 2058)  VTE Prevention/Management:   bilateral   SCDs (sequential compression devices) on  Intervention: Prevent Infection  Flowsheets (Taken 11/30/2024 0345 by Steffi Lamb PCT)  Infection Prevention:   environmental surveillance performed   hand hygiene promoted   rest/sleep promoted   single patient room provided  Goal: Optimal Comfort and Wellbeing  Intervention: Monitor Pain and Promote Comfort  Flowsheets (Taken 11/29/2024 1430 by Yenny Zaman RN)  Pain Management Interventions:   medication offered but refused   position adjusted   quiet environment facilitated   relaxation techniques promoted  Intervention: Provide Person-Centered Care  Flowsheets  Taken 11/30/2024 0055  Trust Relationship/Rapport:   care explained   choices provided   emotional support provided   empathic listening provided   questions answered   questions encouraged   reassurance provided   thoughts/feelings acknowledged  Taken 11/29/2024 2058  Trust Relationship/Rapport:   care explained   choices provided   emotional support provided   empathic listening provided   questions answered   questions encouraged   reassurance provided   thoughts/feelings acknowledged  Goal: Readiness for  Transition of Care  Intervention: Mutually Develop Transition Plan  Flowsheets (Taken 11/27/2024 4369 by Elizabeth Jordan, RN)  Equipment Needed After Discharge: none  Equipment Currently Used at Home:   walker, rolling   rollator   cpap   power chair,(recliner lift)   shower chair   grab bar   lift device   glucometer  Anticipated Changes Related to Illness: inability to care for self  Transportation Anticipated: family or friend will provide  Transportation Concerns: none  Concerns to be Addressed: adjustment to diagnosis/illness  Readmission Within the Last 30 Days: no previous admission in last 30 days  Patient/Family Anticipated Services at Transition:   skilled nursing   home health care  Patient/Family Anticipates Transition to:   home with help/services   inpatient rehabilitation facility   Goal Outcome Evaluation:  Plan of Care Reviewed With: patient        Progress: improving  Outcome Evaluation: No change. VSS. AOx4. No c/o pain or discomfort. O2 in use @ 2L via NC @ night. RA O2 during day. Lap sites c/d/i. Makes needs known. Call light within reach.

## 2024-11-30 NOTE — NURSING NOTE
Patient was off the unit for surgery from [id number]. Patient had belongings, including a ring and watch, sent to security while he was in surgery. Items were retrieved from security after returning to [address] [place] [address]. Patient was stable upon returning from surgery. X3 lap sites in tact with skin glue. Patient denies pain/nausea-only has pain with movements. Patient oriented x4 with family at bedside. Patient educated on plan of care. Fall precautions in place.

## 2024-11-30 NOTE — DISCHARGE SUMMARY
[facility] Medicine Services  DISCHARGE SUMMARY    Patient Name: Rock Maciel Jr.  : 1945  MRN: [id number]    Date of Admission: 2024  9:55 PM  Date of Discharge: 2024  Primary Care Physician: Denise Dickson APRN    Consults       Date and Time Order Name Status Description    2024  7:34 AM Inpatient General Surgery Consult Completed     2024  6:39 AM Inpatient Nephrology Consult      2024  1:09 AM A (on-call MD unless specified) Details      2024 11:58 PM Inpatient General Surgery Consult Completed     2024  6:55 AM Inpatient Pulmonology Consult Completed     2024  4:10 PM Inpatient Nephrology Consult Completed             Hospital Course       Active Hospital Problems    Diagnosis  POA    Atrial fibrillation, persistent [I48.19]  Yes    Stage 3b chronic kidney disease [N18.32]  Yes    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 is a 79 y.o. male  presents to the hospital with incarcerated inguinal hernia and general surgery team was able to reduce the hernia in the emergency room.  Patient was also found to have acute kidney injury on CKD 3B.    Patient was monitored carefully in the hospital with assistance of surgery consult team and nephrology consult team.  He ultimately underwent surgical intervention on 2024 and did well and is tolerating his diet and has been cleared by surgery to discharge home.  He will  follow-up closely with the surgeon postoperatively for postoperative follow-up.  He is controlling his pain currently with Tylenol.    Patient had acute kidney injury with chronic kidney disease.  His renal function has stabilized and he was managed with the assistance of his nephrology consult team.  He will follow-up with them in clinic after his hospitalization and they will continue to monitor his laboratory studies in the outpatient basis.      Discussed with patient regarding his stability and his wheelchair at home is currently nonfunctional.  He has requested a new wheelchair and I have ordered this through case management.       Continue amiodarone for atrial fibrillation.  Hold anticoagulation for surgical intervention and he has now been cleared to restart Eliquis per my conversation with surgery today and will start his first dose this evening     Appreciate nephrology recommendations regarding KILLIAN and CKD.  Monitor blood pressure and continue current medications and adjust as needed.  Alpha-blocker initially was  decreased while patient is acutely ill.  He was taking 5 mg at home and in the hospital we have had him on 1 mg dose.  Plan to discharge him on the 2 mg dose and recommend a blood pressure check at primary care follow-up.    Please note for anemia patient did receive iron infusion.     Tradjenta for diabetes.  A1c most recently was 6.5 in March.  Glucose reasonably controlled while hospitalized.  Primary care follow-up     PT for debility recommending home with home health.      At the time of discharge patient was told to take all medications as prescribed, keep all follow-up appointments, and call their doctor or return to the hospital with any worsening or concerning symptoms.    Please note that this note was made using Dragon voice recognition software             Day of Discharge     Subjective: Patient feels well today.  He is eager to discharge.  He denies any other new  complaints.    Vital Signs:   Temp:  [97.7 °F (36.5 °C)-98.6 °F (37 °C)] 98.4 °F (36.9 °C)  Heart Rate:  [56-68] 61  Resp:  [16-20] 19  BP: (116-172)/(41-63) 135/51     Physical Exam:     Constitutional:Awake, alert, elderly appearing  HENT: NCAT, mucous membranes moist, neck supple  Respiratory: No cough or wheezes, normal respirations, nonlabored breathing   Cardiovascular: Pulse rate is normal, palpable radial pulses  Gastrointestinal:  soft, some expected postoperative tenderness without guarding, nondistended  Musculoskeletal: Somewhat frail appearance, no lower extremity edema, BMI 34 obese  Psychiatric: Appropriate affect, cooperative, conversational  Neurologic: No slurred speech or facial droop, follows commands  Skin: No rashes or jaundice, warm    Pertinent  and/or Most Recent Results     Results from last 7 days   Lab Units 11/30/24  0732 11/29/24  0600 11/28/24  0553 11/27/24  0742 11/26/24  0622 11/25/24  2231   WBC 10*3/mm3 6.40 5.62 5.73 5.38 9.90 11.29*   HEMOGLOBIN g/dL 8.5* 8.8* 9.1* 9.2* 9.4* 10.8*   HEMATOCRIT % 29.1* 28.5* 28.6* 28.6* 30.2* 33.5*   PLATELETS 10*3/mm3 245 254 260 262 277 304   SODIUM mmol/L 142 140 139 138 137 133*   POTASSIUM mmol/L 4.7 4.0 3.8 3.7 3.5 3.7   CHLORIDE mmol/L 107 105 102 101 95* 91*   CO2 mmol/L 27.4 28.0 29.0 29.0 30.5* 29.0   BUN mg/dL 32* 40* 47* 52* 66* 71*   CREATININE mg/dL 1.95* 2.31* 2.43* 2.24* 2.85* 3.32*   GLUCOSE mg/dL 101* 112* 105* 87 111* 175*   CALCIUM mg/dL 8.0* 8.2* 8.3* 8.4* 8.5* 9.5     Results from last 7 days   Lab Units 11/25/24  2231   BILIRUBIN mg/dL 0.5   ALK PHOS U/L 115   ALT (SGPT) U/L 16   AST (SGOT) U/L 8     Imaging Results (All)       Procedure Component Value Units Date/Time    CT Abdomen Pelvis Without Contrast [[id number]] Collected: 11/25/24 2326     Updated: 11/25/24 2338    Narrative:      CT OF THE ABDOMEN PELVIS WITHOUT CONTRAST     HISTORY: Abdominal pain and bloating     COMPARISON: None available.     TECHNIQUE: Axial CT  imaging was obtained through the abdomen and pelvis.  No IV contrast was administered.     FINDINGS:  Images through the lung bases demonstrate some rounded atelectasis  within the right lower lobe. There are calcified pleural plaques noted  on the left. There is enlargement of the main pulmonary artery, which  can be seen in the setting of pulmonary arterial hypertension. No  suspicious hepatic lesions are seen. There is a small hiatal hernia,  which is distended with fluid. The duodenum, adrenal glands, spleen, and  pancreas are normal. Gallbladder is absent. No hydronephrosis is seen on  either side. There is a 4 mm nonobstructing stone within the right  kidney. There is a simple appearing left renal cyst. No additional  follow-up is necessary. There are aortoiliac calcifications. Prostate  gland is within normal limits. Urinary bladder is distended, and there  is a bladder diverticulum. The patient has marked distention of the  colon from the cecum to the distal descending colon. The rectum and  sigmoid colon are completely collapsed. Appearance is concerning for  obstruction. The transition point appears to be a left inguinal hernia,  which contains a loop of distal descending colon no pneumatosis or free  air is seen. There is trace free fluid noted within the abdomen. There  is no evidence of small bowel obstruction. No acute osseous  abnormalities are seen. There is no evidence of appendicitis. There is  some relative muscular atrophy of the left psoas musculature and left  gluteal musculature.       Impression:      The patient has a colonic obstruction, with transition point noted  within a left inguinal hernia sac. No pneumatosis or free air is seen at  this time, although there is a small amount of free fluid which is noted  within the abdomen.     Radiation dose reduction techniques were utilized, including automated  exposure control and exposure modulation based on body size.        This report was  finalized on 11/25/2024 11:35 PM by Dr. Inocencia Cruz M.D on Workstation: [id number]               Results for orders placed during the hospital encounter of 03/17/21    Duplex venous lower extremity bilateral CAR    Interpretation Summary  · There was deep venous valvular incompetence noted in the right common femoral and popliteal.  · There was superficial venous valvular incompetence noted in the right greater saphenous (below knee).  · Chronic right lower extremity superficial thrombophlebitis noted in the greater saphenous (below knee).  · There was superficial venous valvular incompetence noted in the left greater saphenous (below knee).  · Chronic left lower extremity superficial thrombophlebitis noted in the small saphenous.  · All other veins appeared normal bilaterally.      Results for orders placed during the hospital encounter of 03/17/21    Duplex venous lower extremity bilateral CAR    Interpretation Summary  · There was deep venous valvular incompetence noted in the right common femoral and popliteal.  · There was superficial venous valvular incompetence noted in the right greater saphenous (below knee).  · Chronic right lower extremity superficial thrombophlebitis noted in the greater saphenous (below knee).  · There was superficial venous valvular incompetence noted in the left greater saphenous (below knee).  · Chronic left lower extremity superficial thrombophlebitis noted in the small saphenous.  · All other veins appeared normal bilaterally.      Results for orders placed during the hospital encounter of 10/03/24    Adult Transthoracic Echo Complete W/ Cont if Necessary Per Protocol    Interpretation Summary    Left ventricular ejection fraction appears to be 61 - 65%.    Left ventricular diastolic function was indeterminate.    The left atrial cavity is moderately dilated.    Left atrial volume is moderately increased.    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    Estimated right  ventricular systolic pressure from tricuspid regurgitation is moderately elevated (45-55 mmHg).    Discharge Details        Discharge Medications        New Medications        Instructions Start Date   acetaminophen 325 MG tablet  Commonly known as: TYLENOL   650 mg, Oral, Every 6 Hours PRN             Changes to Medications        Instructions Start Date   terazosin 2 MG capsule  Commonly known as: HYTRIN  What changed:   medication strength  See the new instructions.   2 mg, Oral, Nightly             Continue These Medications        Instructions Start Date   Accu-Chek Keshia Plus test strip  Generic drug: glucose blood   USE TO TEST BLOOD SUGAR    TWICE DAILY FOR DIABETES      amiodarone 200 MG tablet  Commonly known as: PACERONE   200 mg, Oral, Daily      atorvastatin 20 MG tablet  Commonly known as: LIPITOR   20 mg, Oral, Nightly, NEED TO SEE PROVIDER FOR FUTURE REFILLS.      bumetanide 1 MG tablet  Commonly known as: BUMEX   1 mg, Oral, 2 Times Daily      cholecalciferol 25 MCG (1000 UT) tablet  Commonly known as: VITAMIN D3   1,000 Units, Daily      clopidogrel 75 MG tablet  Commonly known as: PLAVIX   75 mg, Oral, Daily      Eliquis 2.5 MG tablet tablet  Generic drug: apixaban   2.5 mg, Oral, 2 Times Daily      glipizide 5 MG tablet  Commonly known as: GLUCOTROL   5 mg, Oral, 2 Times Daily Before Meals      hydrALAZINE 25 MG tablet  Commonly known as: APRESOLINE   25 mg, Oral, 3 Times Daily      linagliptin 5 MG tablet tablet  Commonly known as: Tradjenta   5 mg, Oral, Daily      metoprolol succinate XL 25 MG 24 hr tablet  Commonly known as: TOPROL-XL   25 mg, Oral, Daily      nitroglycerin 0.4 MG SL tablet  Commonly known as: NITROSTAT   0.4 mg, Sublingual, Every 5 Minutes PRN, Take no more than 3 doses in 15 minutes.      vitamin C 250 MG tablet  Commonly known as: ASCORBIC ACID   250 mg, Daily      Zinc 50 MG tablet                Allergies   Allergen Reactions    Ceclor [Cefaclor] Rash     Rash in his  50s; he tolerated piperacillin-tazobactam in March 2020         Discharge Disposition:  Home or Self Care    Diet:  Hospital:  Diet Order   Procedures    Diet: Regular/House; Fluid Consistency: Thin (IDDSI 0)       Activity:  Activity Instructions       Activity as Tolerated                   CODE STATUS:    Code Status and Medical Interventions: CPR (Attempt to Resuscitate); Full Support   Ordered at: 11/26/24 0157     Code Status (Patient has no pulse and is not breathing):    CPR (Attempt to Resuscitate)     Medical Interventions (Patient has pulse or is breathing):    Full Support       Future Appointments   Date Time Provider Department Center   5/6/2025  1:15 PM Mary Beth Steel APRN [facility]       Additional Instructions for the Follow-ups that You Need to Schedule       Ambulatory Referral to Home Health ([facility])   As directed      Face to Face Visit Date: 11/30/2024   Follow-up provider for Plan of Care?: I treated the patient in an acute care facility and will not continue treatment after discharge.   Follow-up provider: DENISE BRICENO [[id number]]   Reason/Clinical Findings: Weakness, recent incarcerated hernia, difficulty walking   Describe mobility limitations that make leaving home difficult: Weakness, recent incarcerated hernia, difficulty walking   Nursing/Therapeutic Services Requested: Physical Therapy   PT orders: Strengthening Home safety assessment   Frequency: 1 Week 1        Discharge Follow-up with PCP   As directed       Currently Documented PCP:    Denise Briceno APRN    PCP Phone Number:    [phone]     Follow Up Details: Recommend primary care provider within 1 week for general hospital follow-up, please call Monday to schedule        Discharge Follow-up with Specified Provider: Follow-up with Dr. Lazo with surgery as instructed.  Please call his office with any questions   As directed      To: Follow-up with Dr. Lazo with surgery as instructed.  Please call his  office with any questions               Follow-up Information       Denise Dickson, APRN .    Specialties: Nurse Practitioner, Internal Medicine, Family Medicine  Why: Recommend primary care provider within 1 week for general hospital follow-up, please call Monday to schedule  Contact information:  [address] [facility] [address] [phone]                                 Bishop Chakraborty MD  11/30/24      Time Spent on Discharge:  I spent greater than 40 minutes on this discharge activity which included: face-to-face encounter with the patient, reviewing the data in the system, coordination of the care with the nursing staff as well as consultants, documentation, and entering orders.

## 2024-11-30 NOTE — PROGRESS NOTES
PROGRESS NOTE      Patient Name: Rock Maciel Jr.  : 1945  MRN: [id number]  Primary Care Physician: Denise Dickson APRN  Date of admission: 2024    Patient Care Team:  Denise Dickson APRN as PCP - General (Nurse Practitioner)  Azam Banegas Jr., MD as Consulting Physician (Cardiology)  Jamil Stevens MD as Consulting Physician (Nephrology)        Reason for Follow up:     KILLIAN on CKD       Subjective:     Patient seen and examined, comfortable, not in distress  S/p left inguinal hernia repair     Review of systems:  Constitutional: weakness.  HEENT:  No headache, otalgia, itchy eyes, nasal discharge or sore throat.  Cardiac:  No chest pain, dyspnea, orthopnea or PND.  Chest:              No cough, phlegm or wheezing.  Abdomen:  abdominal pain  Neuro:  No focal weakness, abnormal movements or seizure-like activity.  :   No hematuria, no pyuria, no dysuria, no flank pain.  ROS was otherwise negative except as mentioned in the [place].       Medications:  Prior to Admission medications    Medication Sig Start Date End Date Taking? Authorizing Provider   amiodarone (PACERONE) 200 MG tablet Take 1 tablet by mouth Daily. 24  Yes Jo Toney MD   apixaban (ELIQUIS) 2.5 MG tablet tablet Take 1 tablet by mouth 2 (Two) Times a Day. Indications: Atrial Fibrillation 11/15/24  Yes Apurva Smith APRN   bumetanide (BUMEX) 1 MG tablet Take 1 tablet by mouth 2 (Two) Times a Day. 11/15/24  Yes Apurva Smith APRN   Cholecalciferol 25 MCG (1000 UT) tablet Take 1 tablet by mouth Daily.   Yes Provider, MD Shivam   glipizide (GLUCOTROL) 5 MG tablet TAKE 1 TABLET BY MOUTH 2 TIMES A DAY BEFORE MEALS. 10/29/24  Yes Denise Dickson APRN   hydrALAZINE (APRESOLINE) 25 MG tablet Take 1 tablet by mouth 3 (Three) Times a Day. 24  Yes Apurva Smith APRN   linagliptin (Tradjenta) 5 MG tablet tablet Take 1 tablet by mouth Daily. 24  Yes Denise Dickson,  ZEINA   metoprolol succinate XL (TOPROL-XL) 25 MG 24 hr tablet Take 1 tablet by mouth Daily. 11/15/24  Yes Apurva Smith APRN   vitamin C (ASCORBIC ACID) 250 MG tablet Take 1 tablet by mouth Daily.   Yes Shivam Smith MD   Zinc 50 MG tablet  2/19/22  Yes Shivam Smith MD   atorvastatin (LIPITOR) 20 MG tablet Take 1 tablet by mouth Every Night. NEED TO SEE PROVIDER FOR FUTURE REFILLS. 10/10/24   Denise Dickson APRN   clopidogrel (PLAVIX) 75 MG tablet Take 1 tablet by mouth Daily. 10/16/24   Denise Dickson APRN   glucose blood (Accu-Chek Keshia Plus) test strip USE TO TEST BLOOD SUGAR    TWICE DAILY FOR DIABETES 7/29/24   Denise Dickson APRN   nitroglycerin (NITROSTAT) 0.4 MG SL tablet Place 1 tablet under the tongue Every 5 (Five) Minutes As Needed for Chest Pain (Only if SBP Greater Than 100). Take no more than 3 doses in 15 minutes. 11/15/24   Apurva Smith APRN   terazosin (HYTRIN) 5 MG capsule TAKE 1 CAPSULE BY MOUTH EVERY DAY AT BEDTIME FOR 90 DAYS 8/1/24   ProviderShivam MD     Scheduled Meds:amiodarone, 200 mg, Oral, Daily  vitamin C, 250 mg, Oral, Daily  atorvastatin, 20 mg, Oral, Nightly  cholecalciferol, 1,000 Units, Oral, Daily  ferric gluconate, 125 mg, Intravenous, Q24H  ferric gluconate, 125 mg, Intravenous, Once  insulin lispro, 2-7 Units, Subcutaneous, 4x Daily AC & at Bedtime  linagliptin, 5 mg, Oral, Daily  Menthol-Zinc Oxide, 1 Application, Topical, Q12H  metoprolol succinate XL, 25 mg, Oral, Daily  sodium chloride, 10 mL, Intravenous, Q12H  terazosin, 1 mg, Oral, Nightly      Continuous Infusions:sodium chloride, 75 mL/hr, Last Rate: 75 mL/hr (11/29/24 1739)        PRN Meds:  acetaminophen **OR** acetaminophen **OR** acetaminophen    senna-docusate sodium **AND** polyethylene glycol **AND** bisacodyl **AND** bisacodyl    calcium carbonate    dextrose    dextrose    glucagon (human recombinant)    labetalol    ondansetron ODT **OR** ondansetron     sodium chloride    sodium chloride  Allergies:    Allergies   Allergen Reactions    Ceclor [Cefaclor] Rash     Rash in his 50s; he tolerated piperacillin-tazobactam in March 2020       Objective   Exam:     Vital Signs  Temp:  [97.7 °F (36.5 °C)-98.6 °F (37 °C)] 98.4 °F (36.9 °C)  Heart Rate:  [56-68] 61  Resp:  [16-20] 19  BP: (116-172)/(41-63) 135/51  SpO2:  [90 %-100 %] 97 %  on  Flow (L/min) (Oxygen Therapy):  [1-4] 2;   Device (Oxygen Therapy): nasal cannula  Body mass index is 36.82 kg/m².  EXAM  General:  ill appearing male in no acute distress.    Head:      Normocephalic and atraumatic.    Eyes:      PERRL/EOM intact, conjunctivae and sclerae clear without nystagmus.    Neck:      No masses, thyromegaly,  trachea central   Lungs:    Clear bilaterally to auscultation.    Heart:      Regular rate and rhythm, no murmur no gallop  Abd:        Soft, nontender, not distended, bowel sounds positive, no shifting dullness.  Msk:        No deformity or scoliosis noted of thoracic or lumbar spine.    Pulses:   Pulses normal in all 4 extremities.    Extremities:        No cyanosis or clubbing--edema.    Neuro:    No focal deficits.   alert oriented x3  Skin:       Intact without lesions or rashes.    Psych:    Alert and cooperative; normal mood and affect; normal attention span       Results Review:  I have personally reviewed most recent Data :  [facility]  CBC    Results from last 7 days   Lab Units 11/30/24  0732 11/29/24  0600 11/28/24  0553 11/27/24  0742 11/26/24  0622 11/25/24  2231   WBC 10*3/mm3 6.40 5.62 5.73 5.38 9.90 11.29*   HEMOGLOBIN g/dL 8.5* 8.8* 9.1* 9.2* 9.4* 10.8*   PLATELETS 10*3/mm3 245 254 260 262 277 304     CMP   Results from last 7 days   Lab Units 11/30/24  0732 11/29/24  0600 11/28/24  0553 11/27/24  0742 11/26/24  0622 11/25/24  3351   SODIUM mmol/L 142 140 139 138 137 133*   POTASSIUM mmol/L 4.7 4.0 3.8 3.7 3.5 3.7   CHLORIDE mmol/L 107 105 102 101 95* 91*   CO2 mmol/L  27.4 28.0 29.0 29.0 30.5* 29.0   BUN mg/dL 32* 40* 47* 52* 66* 71*   CREATININE mg/dL 1.95* 2.31* 2.43* 2.24* 2.85* 3.32*   GLUCOSE mg/dL 101* 112* 105* 87 111* 175*   ALBUMIN g/dL  --   --   --   --   --  4.1   BILIRUBIN mg/dL  --   --   --   --   --  0.5   ALK PHOS U/L  --   --   --   --   --  115   AST (SGOT) U/L  --   --   --   --   --  8   ALT (SGPT) U/L  --   --   --   --   --  16   LIPASE U/L  --   --   --   --   --  43     ABG      CT Abdomen Pelvis Without Contrast    Result Date: 11/25/2024  The patient has a colonic obstruction, with transition point noted within a left inguinal hernia sac. No pneumatosis or free air is seen at this time, although there is a small amount of free fluid which is noted within the abdomen.  Radiation dose reduction techniques were utilized, including automated exposure control and exposure modulation based on body size.   This report was finalized on 11/25/2024 11:35 PM by Dr. Inocencia Cruz M.D on Workstation: [id number]       Results for orders placed during the hospital encounter of 10/03/24    Adult Transthoracic Echo Complete W/ Cont if Necessary Per Protocol    Interpretation Summary    Left ventricular ejection fraction appears to be 61 - 65%.    Left ventricular diastolic function was indeterminate.    The left atrial cavity is moderately dilated.    Left atrial volume is moderately increased.    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    Estimated right ventricular systolic pressure from tricuspid regurgitation is moderately elevated (45-55 mmHg).        Assessment & Plan   Assessment and Plan:         Essential hypertension    Type 2 diabetes mellitus with circulatory disorder, without long-term current use of insulin    Hyperlipidemia    H/O aortic valve replacement with porcine valve    S/P CABG x 2    CKD (chronic kidney disease) stage 3, GFR 30-59 ml/min    Stage 3b chronic kidney disease    Atrial fibrillation, persistent    ASSESSMENT:  KILLIAN   CKD  III, with baseline creatinine ~ 2.0, secondary to diabetic and hypertensive nephropathy, creatinine 1.97 at last office appointment 07/29/2024. Prior serology negative, hepatitis profile negative, SPEP with immunofixation negative for monoclonal protein, serum free light chain ratio 1.5, serum complement C3-C4 normal, ZOIE negative, c-ANCA and p-ANCA negative. Prior urinalysis from 11/19/2024 with trace protein, UA from 11/26 with 30mg protein, large blood, 11-20 RBC. Urine osmolality 334, urine sodium 39. Prior renal US from 11/8/24 with right kidney 10.7cm, left kidney 11.9 cm, no right sided hydronephrosis, the left kidney is significantly obscured by overlying shadowing and unfortunately left-sided renal pathology cannot be excluded.   abdominal pain, reported constipation. CT AP WO with colonic obstruction, with transition point noted within a left inguinal hernia sac. No pneumatosis or free air is seen at this time, although there is a small amount of free fluid which is noted within the abdomen  Afib, on Eliquis  CAD s/p CABG  Anemia  AVR  HTN  HLD  DM    Prior echo showed EF of 61 to 65%, intermediate LV diastolic function.  Left atrial cavity and moderately dilated.       PLAN :     Renal function, improving with volume resuscitation, sCr on admit 3.3,      Kidney function fairly stable with a creatinine of 1.9, acid-base looks stable, volume looks stable, started IV fluid, anticipating n.p.o. status, volume acceptable, no sign of volume overload,   Tolerating PO intake, expected, mild pain abdomen.   Ok to DC iv fluids.   Electrolytes and acid/base stable   Hemoglobin stable, evident of KEE, started iv iron,    D/w Dr Chakraborty, possible DC today, ok from renal stand point.   blood pressure Labile, but stable, avoid hypotension, pain control,  Will closely follow,  Daily weights, Strict I&O's  Avoid nephrotoxic agents including NSAIDs  We will follow and coordinate with team      Surgery input noted.     Note  transcribed for [facility]  11/30/2024  10:31 EST     Attestation Statement:   I personally have examined the patient and interviewed the patient. I have reviewed the history, data, problems, assessment and plan with the nurse practitioner during rounds. and I concur with the history. exam, asssesment and plan as described in the Progress note, with comments additions, revisions as noted.      MD Edward [facility]

## 2024-11-30 NOTE — PROGRESS NOTES
Colorectal & General Surgery  Progress Note    Patient: Rock Maciel Jr.  YOB: 1945  MRN: [id number]      Assessment  Rock Maciel Jr. is a 79 y.o. male with incarcerated left inguinal hernia now status post laparoscopic robot-assisted left inguinal hernia repair.  He is recovering nicely.  Pain is well-controlled.  He is tolerating diet and having bowel function.    Okay for discharge from my standpoint.  Okay to restart Eliquis and Plavix.  I will see him back in the office in 2 weeks.    Subjective  Feels well this morning with no significant complaints.    Objective    Vitals:    11/30/24 0740   BP: 135/51   Pulse: 61   Resp: 19   Temp: 98.4 °F (36.9 °C)   SpO2: 97%       Physical Exam  Constitutional: Well-developed well-nourished, no acute distress  Neck: Supple, trachea midline  Respiratory: No increased work of breathing, Symmetric excursion  Cardiovascular: Well pefursed, no jugular venous distention evident   Abdominal: Incisions in good order.  Soft, non-tender, non-distended  Skin: Warm, dry, no rash on visualized skin surfaces  Psychiatric: Alert and oriented ×3, normal affect     Laboratory Results  I have personally reviewed CBC with WC 6, hemoglobin 8.5, platelets 245.  BMP with creatinine 1.9.    Radiology  None to review         Erick Lazo MD  Colorectal & General Surgery  [facility]    [address] [place] [address], [address]  [place], [place], [address]  P: [phone]  F: [phone]

## 2024-12-01 PROBLEM — R31.9 HEMATURIA: Status: ACTIVE | Noted: 2024-12-01

## 2024-12-01 PROBLEM — R09.02 HYPOXEMIA: Status: ACTIVE | Noted: 2024-12-01

## 2024-12-01 PROBLEM — G47.34 NOCTURNAL HYPOXEMIA: Status: ACTIVE | Noted: 2024-12-01

## 2024-12-01 PROBLEM — N18.4 CKD (CHRONIC KIDNEY DISEASE) STAGE 4, GFR 15-29 ML/MIN: Status: ACTIVE | Noted: 2024-12-01

## 2024-12-01 PROBLEM — N32.89 BLADDER WALL THICKENING: Status: ACTIVE | Noted: 2024-12-01

## 2024-12-01 NOTE — ASSESSMENT & PLAN NOTE
Stable on Oxygen at 2lpm via nasal cannula.  Encouraged to follow-up with Dr. Camilo El, Pulmonology.

## 2024-12-01 NOTE — ASSESSMENT & PLAN NOTE
Controlled on Amiodarone and Metoprolol Succ XL.  Discussed importance of while he is on Amiodarone he will need do a check up every 6 months checkup for eye examination, thyroid function test, chest x-ray and liver function test has been advised.   Followed by Dr. Toney, next appointment on 11/21/24, at 12:15.

## 2024-12-01 NOTE — ASSESSMENT & PLAN NOTE
SHASTA stable on C-Pap with supplemental oxygen.  Encouraged to follow-up with Dr. Camilo El, Pulmonology.

## 2024-12-01 NOTE — ASSESSMENT & PLAN NOTE
Uncontrolled; Cr. 2.69, eGFR 23.2.  Taking Bumex 1mg BID.  He has appointment to see Dr. Maru Talavera, Nephrology on 12/2/24 at 12:45pm.  Encouraged to attend follow-up appointment.

## 2024-12-01 NOTE — ASSESSMENT & PLAN NOTE
Hematuria on prior lab.  Order:  POCT Urinalysis  Order:  Urine Culture  Will call with result and plan.  Encouraged to call and schedule appointment to follow-up with Urology and he agreed.

## 2024-12-01 NOTE — ASSESSMENT & PLAN NOTE
Discussed importance of following-up with [facility] due to abnormal CT.  He missed his appointment this week on Monday.  He knows he has to call and reschedule appointment this week and stated he will call and re-scheduled the appointment.

## 2024-12-01 NOTE — ASSESSMENT & PLAN NOTE
Hypertension is stable on Amiodarone 200mg BID (will be reduced to 1 tab daily), Bumex 1mg BID, Hydralyzine 25mg TID, and Metoprolol Succ XL 25mg daily.  Continue current treatment plan.  Decrease table salt and foods high in salt.  Weight loss.  Increase activity daily.  Discussed importance of monitoring blood pressure 1 hour and a half after taking blood pressure medications and to write the reading down along with heart rate.  Please bring these home BP readings with you on your follow up appointments. Call Cardiology if sys <100, or HR <60.  Blood pressure will be re-assessed in 1 month.

## 2024-12-02 ENCOUNTER — TELEPHONE (OUTPATIENT)
Dept: FAMILY MEDICINE CLINIC | Facility: CLINIC | Age: 79
End: 2024-12-02
Payer: MEDICARE

## 2024-12-02 ENCOUNTER — HOME CARE VISIT (OUTPATIENT)
Dept: HOME HEALTH SERVICES | Facility: HOME HEALTHCARE | Age: 79
End: 2024-12-02
Payer: COMMERCIAL

## 2024-12-02 ENCOUNTER — TRANSITIONAL CARE MANAGEMENT TELEPHONE ENCOUNTER (OUTPATIENT)
Dept: CALL CENTER | Facility: HOSPITAL | Age: 79
End: 2024-12-02
Payer: MEDICARE

## 2024-12-02 PROCEDURE — G0151 HHCP-SERV OF PT,EA 15 MIN: HCPCS

## 2024-12-02 NOTE — TELEPHONE ENCOUNTER
Caller: Rock Maciel Jr.    Relationship to patient: Self    Best call back number: [phone]     New or established patient?  [] New  [x] Established    Date of discharge: 11/30/24    Facility discharged from: [facility]    Diagnosis/Symptoms: HERNIA REPAIR    Length of stay (If applicable): 5        Additional Details: PATIENT WANTS TO SCHEDULE THIS FOLLOW UP ABOUT 3 WEEKS FROM TIME OF DISCHARGE.  I HAVE TO SCHEDULE AT LEAST 7 DAYS.  PLEASE CALL HIM TO SCHEDULE.  NO ANSWER AT OFFICE WHEN TRYING TO TRANSFER.

## 2024-12-02 NOTE — Clinical Note
Home PT luis on 12/2/24.  Will follow 1w4 for home safety and transitional care following his hospital stay. Will discuss with clinical team and may add SN for gluteal wound.  Thank you.

## 2024-12-02 NOTE — CASE MANAGEMENT/SOCIAL WORK
Case Management Discharge Note      Final Note: [place] with [facility]         Selected Continued Care - Discharged on 11/30/2024 Admission date: 11/25/2024 - Discharge disposition: Home or Self Care      Destination    No services have been selected for the patient.                Durable Medical Equipment       Service Provider Services Address Phone Fax Patient Preferred    [facility] [address] [facility] [facility] [address] [phone] --              Dialysis/Infusion    No services have been selected for the patient.                Home Medical Care       Service Provider Services Address Phone Fax Patient Preferred    [facility] [address] [address] [phone] --              Therapy    No services have been selected for the patient.                Community Resources    No services have been selected for the patient.                Community & DME    No services have been selected for the patient.                    Transportation Services  Private: Car    Final Discharge Disposition Code: 06 - home with home health care

## 2024-12-02 NOTE — Clinical Note
Upon review of meds these were flagged as having a major interaction potential and is policy for Home health to notify the physician.      metoprolol succinate XL (TOPROL-XL) 25 MG 24 hr tablet   amiodarone (PACERONE) 200 MG tablet     Significance: Major    Warning: Administration of amiodarone and metoprolol succinate XL may result in severe bradycardia, hypotension and cardiac arrest.    Onset: Rapid Documentation Level: Possible    Management Code: Professional review suggested    Effects: Administration of amiodarone and metoprolol succinate XL may result in severe bradycardia, hypotension and cardiac arrest.    Mechanism: Additive pharmacologic effect is the suspected mechanism. Other mechanisms may also exist.    Management: Additional clinical monitoring is indicated. If an interaction is suspected, stop metoprolol succinate XL or both drugs and administer beta-adrenergic agonist when indicated  --------------------------------------------------------------------------------------------------------------------------------------  apixaban (ELIQUIS) 2.5 MG tablet tablet   clopidogrel (PLAVIX) 75 MG tablet     Significance: Major    Warning: Use of apixaban with clopidogrel may increase the risk of bleeding.    Onset: Rapid    Documentation Level: Suspected    Management Code: Professional review suggested    Effects: Use of apixaban with clopidogrel may increase the risk of bleeding.    Mechanism: Interference of normal clotting by multiple mechanisms may increase the risk of bleeding.      .

## 2024-12-02 NOTE — OUTREACH NOTE
Call Center TCM Note      Flowsheet Row Responses   [facility] patient discharged from? [place]   Does the patient have one of the following disease processes/diagnoses(primary or secondary)? General Surgery   TCM attempt successful? Yes   Call start time 0904   Call end time 0909   Discharge diagnosis Inquinal hernia repair   Is patient permission given to speak with other caregiver? Yes   Person spoke with today (if not patient) and relationship Claudette Maciel   Daughter   Meds reviewed with patient/caregiver? Yes  [New: tylenol    Dose change on terazosin]   Does the patient have all medications related to this admission filled (includes all antibiotics, pain medications, etc.) Yes   Is the patient taking all medications as directed (includes completed medication regime)? Yes   Comments PCP Denise PASTRANA. Patient did not answer. Spoke to patients daughter Claudette, listed on PCP verbal release. She reports that she is in [place], but will speak to patient today. I asked her to please have patient contact PCP office to schedule needed follow up appt and address any needs.   Does the patient have an appointment with their PCP within 7-14 days of discharge? No   Nursing Interventions Routed TCM call to PCP office  [Daughter to have patient contact PCP office to schedule follow up appt and address any needs.]   What is the Home health agency?  [facility]   Has home health visited the patient within 72 hours of discharge? Call prior to 72 hours  [PT scheduled for start of care today]   What DME was ordered? [employer] for new wheelchair   Has all DME been delivered? --  [Daughter will check with patient]   Psychosocial issues? No   Did the patient receive a copy of their discharge instructions? Yes   Nursing interventions Reviewed instructions with patient  [daughter]   What is the patient's perception of their health status since discharge? Improving  [Daughter states that she will speak to patient today, she  is in [place]]   Is the patient/caregiver able to teach back steps to recovery at home? Set small, achievable goals for return to baseline health, Rest and rebuild strength, gradually increase activity   If the patient is a current smoker, are they able to teach back resources for cessation? Not a smoker   Is the patient/caregiver able to teach back the hierarchy of who to call/visit for symptoms/problems? PCP, Specialist, Home health nurse, Urgent Care, ED, 911 Yes   TCM call completed? Yes   Call end time 0909   Would this patient benefit from a Referral to [facility] Social Work? No   Is the patient interested in additional calls from an ambulatory ? No            Yoly Dugan RN    12/2/2024, 09:14 EST

## 2024-12-03 VITALS
HEART RATE: 62 BPM | DIASTOLIC BLOOD PRESSURE: 64 MMHG | OXYGEN SATURATION: 97 % | TEMPERATURE: 98.4 F | RESPIRATION RATE: 18 BRPM | SYSTOLIC BLOOD PRESSURE: 156 MMHG

## 2024-12-03 NOTE — PAYOR COMM NOTE
"Madyson Daniels JrJc (79 y.o. Male)     ATTN: NURSE REVIEWER  RE: DISCHARGE SUMMARY  REF# [id number]  PLS REPLY TO HERIBERTO JONES [phone] OR FAX# [phone]      Date of Birth   1945    Social Security Number       Address   [address] [place] [address]    Home Phone   [phone]    MRN   [id number]       Adventism   Voodoo    Marital Status                               Admission Date   24    Admission Type   Emergency    Admitting Provider   Bishop Chakraborty MD    Attending Provider       Department, Room/Bed   [address], [address]/1       Discharge Date   2024    Discharge Disposition   Home or Self Care    Discharge Destination                                 Attending Provider: (none)   Allergies: Ceclor [Cefaclor]    Isolation: None   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)   Code Status: CPR    Ht: 185.4 cm (73\")   Wt: 127 kg (279 lb 1.6 oz)    Admission Cmt: None   Principal Problem: Incarcerated inguinal hernia [K40.30]                   Active Insurance as of 2024       Primary Coverage       Payor Plan Insurance Group Employer/Plan Group    ANTHEM MEDICARE REPLACEMENT ANTHEM MEDICARE ADVANTAGE [id number]       Payor Plan Address Payor Plan Phone Number Payor Plan Fax Number Effective Dates    [address] [phone]  2016 - None Entered    [facility] [address]         Subscriber Name Subscriber Birth Date Member ID       MADYSON DANIELS JR. 1945 [id number]                     Emergency Contacts        (Rel.) Home Phone Work Phone Mobile Phone    Claudette Daniels (Daughter) [phone] -- [phone]    JACKIEQUEENRAJ (Friend) [phone] -- [phone]                 Discharge Summary        Bishop Chakraborty MD at [address] [place] [address] Medicine Services  DISCHARGE SUMMARY    Patient Name: Madyson Daniels Jr.  : 1945  MRN: [id number]    Date of " Admission: 11/25/2024  9:55 PM  Date of Discharge: 11/30/2024  Primary Care Physician: Denise Dickson APRN    Consults       Date and Time Order Name Status Description    11/26/2024  7:34 AM Inpatient General Surgery Consult Completed     11/26/2024  6:39 AM Inpatient Nephrology Consult      11/26/2024  1:09 AM [facility] (on-call MD unless specified) Details      11/25/2024 11:58 PM Inpatient General Surgery Consult Completed     11/12/2024  6:55 AM Inpatient Pulmonology Consult Completed     11/7/2024  4:10 PM Inpatient Nephrology Consult Completed             Hospital Course       Active Hospital Problems    Diagnosis  POA    Atrial fibrillation, persistent [I48.19]  Yes    Stage 3b chronic kidney disease [N18.32]  Yes    S/P CABG x 2 [Z95.1]  Not Applicable    CKD (chronic kidney disease) stage 3, GFR 30-59 ml/min [N18.30]  Yes    H/O aortic valve replacement with porcine valve [Z95.3]  Not Applicable    Hyperlipidemia [E78.5]  Yes    Essential hypertension [I10]  Yes    Type 2 diabetes mellitus with circulatory disorder, without long-term current use of insulin [E11.59]  Yes      Resolved Hospital Problems    Diagnosis Date Resolved POA    **Incarcerated inguinal hernia [K40.30] 11/30/2024 Yes    KILLIAN (acute kidney injury) [N17.9] 11/30/2024 Yes          Hospital Course:  Rock Maciel Jr. is a 79 y.o. male  presents to the hospital with incarcerated inguinal hernia and general surgery team was able to reduce the hernia in the emergency room.  Patient was also found to have acute kidney injury on CKD 3B.    Patient was monitored carefully in the hospital with assistance of surgery consult team and nephrology consult team.  He ultimately underwent surgical intervention on 11/29/2024 and did well and is tolerating his diet and has been cleared by surgery to discharge home.  He will follow-up closely with the surgeon postoperatively for postoperative follow-up.  He is controlling his pain currently with  Tylenol.    Patient had acute kidney injury with chronic kidney disease.  His renal function has stabilized and he was managed with the assistance of his nephrology consult team.  He will follow-up with them in clinic after his hospitalization and they will continue to monitor his laboratory studies in the outpatient basis.      Discussed with patient regarding his stability and his wheelchair at home is currently nonfunctional.  He has requested a new wheelchair and I have ordered this through case management.       Continue amiodarone for atrial fibrillation.  Hold anticoagulation for surgical intervention and he has now been cleared to restart Eliquis per my conversation with surgery today and will start his first dose this evening     Appreciate nephrology recommendations regarding KILLIAN and CKD.  Monitor blood pressure and continue current medications and adjust as needed.  Alpha-blocker initially was  decreased while patient is acutely ill.  He was taking 5 mg at home and in the hospital we have had him on 1 mg dose.  Plan to discharge him on the 2 mg dose and recommend a blood pressure check at primary care follow-up.    Please note for anemia patient did receive iron infusion.     Tradjenta for diabetes.  A1c most recently was 6.5 in March.  Glucose reasonably controlled while hospitalized.  Primary care follow-up     PT for debility recommending home with home health.      At the time of discharge patient was told to take all medications as prescribed, keep all follow-up appointments, and call their doctor or return to the hospital with any worsening or concerning symptoms.    Please note that this note was made using Dragon voice recognition software             Day of Discharge     Subjective: Patient feels well today.  He is eager to discharge.  He denies any other new complaints.    Vital Signs:   Temp:  [97.7 °F (36.5 °C)-98.6 °F (37 °C)] 98.4 °F (36.9 °C)  Heart Rate:  [56-68] 61  Resp:  [16-20] 19  BP:  (116-172)/(41-63) 135/51     Physical Exam:     Constitutional:Awake, alert, elderly appearing  HENT: NCAT, mucous membranes moist, neck supple  Respiratory: No cough or wheezes, normal respirations, nonlabored breathing   Cardiovascular: Pulse rate is normal, palpable radial pulses  Gastrointestinal:  soft, some expected postoperative tenderness without guarding, nondistended  Musculoskeletal: Somewhat frail appearance, no lower extremity edema, BMI 34 obese  Psychiatric: Appropriate affect, cooperative, conversational  Neurologic: No slurred speech or facial droop, follows commands  Skin: No rashes or jaundice, warm    Pertinent  and/or Most Recent Results     Results from last 7 days   Lab Units 11/30/24  0732 11/29/24  0600 11/28/24  0553 11/27/24  0742 11/26/24  0622 11/25/24  2231   WBC 10*3/mm3 6.40 5.62 5.73 5.38 9.90 11.29*   HEMOGLOBIN g/dL 8.5* 8.8* 9.1* 9.2* 9.4* 10.8*   HEMATOCRIT % 29.1* 28.5* 28.6* 28.6* 30.2* 33.5*   PLATELETS 10*3/mm3 245 254 260 262 277 304   SODIUM mmol/L 142 140 139 138 137 133*   POTASSIUM mmol/L 4.7 4.0 3.8 3.7 3.5 3.7   CHLORIDE mmol/L 107 105 102 101 95* 91*   CO2 mmol/L 27.4 28.0 29.0 29.0 30.5* 29.0   BUN mg/dL 32* 40* 47* 52* 66* 71*   CREATININE mg/dL 1.95* 2.31* 2.43* 2.24* 2.85* 3.32*   GLUCOSE mg/dL 101* 112* 105* 87 111* 175*   CALCIUM mg/dL 8.0* 8.2* 8.3* 8.4* 8.5* 9.5     Results from last 7 days   Lab Units 11/25/24  2231   BILIRUBIN mg/dL 0.5   ALK PHOS U/L 115   ALT (SGPT) U/L 16   AST (SGOT) U/L 8     Imaging Results (All)       Procedure Component Value Units Date/Time    CT Abdomen Pelvis Without Contrast [[id number]] Collected: 11/25/24 2326     Updated: 11/25/24 1243    Narrative:      CT OF THE ABDOMEN PELVIS WITHOUT CONTRAST     HISTORY: Abdominal pain and bloating     COMPARISON: None available.     TECHNIQUE: Axial CT imaging was obtained through the abdomen and pelvis.  No IV contrast was administered.     FINDINGS:  Images through the lung bases  demonstrate some rounded atelectasis  within the right lower lobe. There are calcified pleural plaques noted  on the left. There is enlargement of the main pulmonary artery, which  can be seen in the setting of pulmonary arterial hypertension. No  suspicious hepatic lesions are seen. There is a small hiatal hernia,  which is distended with fluid. The duodenum, adrenal glands, spleen, and  pancreas are normal. Gallbladder is absent. No hydronephrosis is seen on  either side. There is a 4 mm nonobstructing stone within the right  kidney. There is a simple appearing left renal cyst. No additional  follow-up is necessary. There are aortoiliac calcifications. Prostate  gland is within normal limits. Urinary bladder is distended, and there  is a bladder diverticulum. The patient has marked distention of the  colon from the cecum to the distal descending colon. The rectum and  sigmoid colon are completely collapsed. Appearance is concerning for  obstruction. The transition point appears to be a left inguinal hernia,  which contains a loop of distal descending colon no pneumatosis or free  air is seen. There is trace free fluid noted within the abdomen. There  is no evidence of small bowel obstruction. No acute osseous  abnormalities are seen. There is no evidence of appendicitis. There is  some relative muscular atrophy of the left psoas musculature and left  gluteal musculature.       Impression:      The patient has a colonic obstruction, with transition point noted  within a left inguinal hernia sac. No pneumatosis or free air is seen at  this time, although there is a small amount of free fluid which is noted  within the abdomen.     Radiation dose reduction techniques were utilized, including automated  exposure control and exposure modulation based on body size.        This report was finalized on 11/25/2024 11:35 PM by Dr. Inocencia Cruz M.D on Workstation: [id number]               Results for orders placed  during the hospital encounter of 03/17/21    Duplex venous lower extremity bilateral CAR    Interpretation Summary  · There was deep venous valvular incompetence noted in the right common femoral and popliteal.  · There was superficial venous valvular incompetence noted in the right greater saphenous (below knee).  · Chronic right lower extremity superficial thrombophlebitis noted in the greater saphenous (below knee).  · There was superficial venous valvular incompetence noted in the left greater saphenous (below knee).  · Chronic left lower extremity superficial thrombophlebitis noted in the small saphenous.  · All other veins appeared normal bilaterally.      Results for orders placed during the hospital encounter of 03/17/21    Duplex venous lower extremity bilateral CAR    Interpretation Summary  · There was deep venous valvular incompetence noted in the right common femoral and popliteal.  · There was superficial venous valvular incompetence noted in the right greater saphenous (below knee).  · Chronic right lower extremity superficial thrombophlebitis noted in the greater saphenous (below knee).  · There was superficial venous valvular incompetence noted in the left greater saphenous (below knee).  · Chronic left lower extremity superficial thrombophlebitis noted in the small saphenous.  · All other veins appeared normal bilaterally.      Results for orders placed during the hospital encounter of 10/03/24    Adult Transthoracic Echo Complete W/ Cont if Necessary Per Protocol    Interpretation Summary    Left ventricular ejection fraction appears to be 61 - 65%.    Left ventricular diastolic function was indeterminate.    The left atrial cavity is moderately dilated.    Left atrial volume is moderately increased.    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    Estimated right ventricular systolic pressure from tricuspid regurgitation is moderately elevated (45-55 mmHg).    Discharge Details         Discharge Medications        New Medications        Instructions Start Date   acetaminophen 325 MG tablet  Commonly known as: TYLENOL   650 mg, Oral, Every 6 Hours PRN             Changes to Medications        Instructions Start Date   terazosin 2 MG capsule  Commonly known as: HYTRIN  What changed:   medication strength  See the new instructions.   2 mg, Oral, Nightly             Continue These Medications        Instructions Start Date   Accu-Chek Keshia Plus test strip  Generic drug: glucose blood   USE TO TEST BLOOD SUGAR    TWICE DAILY FOR DIABETES      amiodarone 200 MG tablet  Commonly known as: PACERONE   200 mg, Oral, Daily      atorvastatin 20 MG tablet  Commonly known as: LIPITOR   20 mg, Oral, Nightly, NEED TO SEE PROVIDER FOR FUTURE REFILLS.      bumetanide 1 MG tablet  Commonly known as: BUMEX   1 mg, Oral, 2 Times Daily      cholecalciferol 25 MCG (1000 UT) tablet  Commonly known as: VITAMIN D3   1,000 Units, Daily      clopidogrel 75 MG tablet  Commonly known as: PLAVIX   75 mg, Oral, Daily      Eliquis 2.5 MG tablet tablet  Generic drug: apixaban   2.5 mg, Oral, 2 Times Daily      glipizide 5 MG tablet  Commonly known as: GLUCOTROL   5 mg, Oral, 2 Times Daily Before Meals      hydrALAZINE 25 MG tablet  Commonly known as: APRESOLINE   25 mg, Oral, 3 Times Daily      linagliptin 5 MG tablet tablet  Commonly known as: Tradjenta   5 mg, Oral, Daily      metoprolol succinate XL 25 MG 24 hr tablet  Commonly known as: TOPROL-XL   25 mg, Oral, Daily      nitroglycerin 0.4 MG SL tablet  Commonly known as: NITROSTAT   0.4 mg, Sublingual, Every 5 Minutes PRN, Take no more than 3 doses in 15 minutes.      vitamin C 250 MG tablet  Commonly known as: ASCORBIC ACID   250 mg, Daily      Zinc 50 MG tablet                Allergies   Allergen Reactions    Ceclor [Cefaclor] Rash     Rash in his 50s; he tolerated piperacillin-tazobactam in March 2020         Discharge Disposition:  Home or Self  Care    Diet:  Hospital:  Diet Order   Procedures    Diet: Regular/House; Fluid Consistency: Thin (IDDSI 0)       Activity:  Activity Instructions       Activity as Tolerated                   CODE STATUS:    Code Status and Medical Interventions: CPR (Attempt to Resuscitate); Full Support   Ordered at: 11/26/24 0157     Code Status (Patient has no pulse and is not breathing):    CPR (Attempt to Resuscitate)     Medical Interventions (Patient has pulse or is breathing):    Full Support       Future Appointments   Date Time Provider Department Center   5/6/2025  1:15 PM Mary Beth Steel APRN [facility]       Additional Instructions for the Follow-ups that You Need to Schedule       Ambulatory Referral to Home Health (Hospital)   As directed      Face to Face Visit Date: 11/30/2024   Follow-up provider for Plan of Care?: I treated the patient in an acute care facility and will not continue treatment after discharge.   Follow-up provider: DENISE BRICENO [[id number]]   Reason/Clinical Findings: Weakness, recent incarcerated hernia, difficulty walking   Describe mobility limitations that make leaving home difficult: Weakness, recent incarcerated hernia, difficulty walking   Nursing/Therapeutic Services Requested: Physical Therapy   PT orders: Strengthening Home safety assessment   Frequency: 1 Week 1        Discharge Follow-up with PCP   As directed       Currently Documented PCP:    Denise Briceno APRN    PCP Phone Number:    [phone]     Follow Up Details: Recommend primary care provider within 1 week for general hospital follow-up, please call Monday to schedule        Discharge Follow-up with Specified Provider: Follow-up with Dr. Lazo with surgery as instructed.  Please call his office with any questions   As directed      To: Follow-up with Dr. Lazo with surgery as instructed.  Please call his office with any questions               Follow-up Information       Denise Briceno APRN .     Specialties: Nurse Practitioner, Internal Medicine, Family Medicine  Why: Recommend primary care provider within 1 week for general hospital follow-up, please call Monday to schedule  Contact information:  [address]  [facility] [address] [phone]                                 Bishop Chakraborty MD  11/30/24      Time Spent on Discharge:  I spent greater than 40 minutes on this discharge activity which included: face-to-face encounter with the patient, reviewing the data in the system, coordination of the care with the nursing staff as well as consultants, documentation, and entering orders.        Electronically signed by Bishop Chakraborty MD at 11/30/24 7609

## 2024-12-03 NOTE — HOME HEALTH
Patient was in the hospital from 11/8 to 11/15 of this year due to fluid overload and SOA, underwent a cardioversion back into sinus rythm.  He went back to the hospital due to abdominal pain and found to have an incarerated inguinal hernia.  He was in the hospital from 11/25 to 11/30 with robotic assisted inguinal hernia repair on 11/29/24.  He lives alone in a cluttered home with little help or assist.  Bathroom and laundry on separate floors with chair lift for patient to access.  PLOF:  he uses a rollator walker mostly for home mobility, with occassional wheelchair use if he is feeling weak.  He sleeps in a recliner on the first floor, has a tub chair to transfer to his shower.  He was still getting out to his car and driving for medical appts and for food.  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     Assessment:  Home PT FOC will be the weakness from his inguinal hernia repair.  He denies the need/servicies of OT, nursing or MSW.  He will have mobility restrictions and be an elevated falls risk despite the intervention.      Plan for next visit  Limited HEP  Possible step navigation if weather permits.   Possible tub transfer if desired.

## 2024-12-04 ENCOUNTER — HOME CARE VISIT (OUTPATIENT)
Dept: HOME HEALTH SERVICES | Facility: HOME HEALTHCARE | Age: 79
End: 2024-12-04
Payer: COMMERCIAL

## 2024-12-05 ENCOUNTER — HOME CARE VISIT (OUTPATIENT)
Dept: HOME HEALTH SERVICES | Facility: HOME HEALTHCARE | Age: 79
End: 2024-12-05
Payer: COMMERCIAL

## 2024-12-10 ENCOUNTER — HOME CARE VISIT (OUTPATIENT)
Dept: HOME HEALTH SERVICES | Facility: HOME HEALTHCARE | Age: 79
End: 2024-12-10
Payer: COMMERCIAL

## 2024-12-10 ENCOUNTER — READMISSION MANAGEMENT (OUTPATIENT)
Dept: CALL CENTER | Facility: HOSPITAL | Age: 79
End: 2024-12-10
Payer: MEDICARE

## 2024-12-10 PROCEDURE — G0151 HHCP-SERV OF PT,EA 15 MIN: HCPCS

## 2024-12-10 NOTE — OUTREACH NOTE
General Surgery Week 2 Survey      Flowsheet Row Responses   [facility] patient discharged from? [place]   Does the patient have one of the following disease processes/diagnoses(primary or secondary)? General Surgery   Week 2 attempt successful? No   Unsuccessful attempts Attempt 1            Krystyna Baltazar Registered Nurse

## 2024-12-11 VITALS
SYSTOLIC BLOOD PRESSURE: 146 MMHG | HEART RATE: 63 BPM | OXYGEN SATURATION: 96 % | DIASTOLIC BLOOD PRESSURE: 60 MMHG | TEMPERATURE: 98.1 F

## 2024-12-11 NOTE — HOME HEALTH
Skilled nursing was added after his SOC was reviewed/completed and image of a bottom sore from the hospital was seen (picture on 11/26 in the hospital).  The patient refused the nursing visit saying he did not need her service.  He told me today the hospital felt the sore was nothing that needed intervention.  He agreed to let me look at his bottom and indeed it appears to have been an old gluteal skin tear (possible pressure area) that has healed and no intervention deemed needed at this time. Inspected and took pictures of his hernia repair sites and they are healing fine.   Patient also states he has been both up and downstairs since last week for both laundry task and for bathing.  He was also able to get out to his car yesterday and states no issues with it.  His only complaint is his back pain if he stands too long.  Ideas given for back pain mitigation. Patient agreed to one more visit next week for final follow up and then discharge since he is close to his baseline.      Plan for next visit  Finalize HEP and do dc assessement

## 2024-12-13 ENCOUNTER — READMISSION MANAGEMENT (OUTPATIENT)
Dept: CALL CENTER | Facility: HOSPITAL | Age: 79
End: 2024-12-13
Payer: MEDICARE

## 2024-12-13 NOTE — OUTREACH NOTE
General Surgery Week 2 Survey      Flowsheet Row Responses   [facility] patient discharged from? [place]   Does the patient have one of the following disease processes/diagnoses(primary or secondary)? General Surgery   Week 2 attempt successful? No   Unsuccessful attempts Attempt 2            Rachell ESPOSITO - Registered Nurse

## 2024-12-17 ENCOUNTER — HOME CARE VISIT (OUTPATIENT)
Dept: HOME HEALTH SERVICES | Facility: HOME HEALTHCARE | Age: 79
End: 2024-12-17
Payer: COMMERCIAL

## 2024-12-17 LAB
QT INTERVAL: 445 MS
QT INTERVAL: 445 MS
QT INTERVAL: 492 MS
QTC INTERVAL: 488 MS
QTC INTERVAL: 500 MS
QTC INTERVAL: 504 MS

## 2024-12-17 PROCEDURE — G0299 HHS/HOSPICE OF RN EA 15 MIN: HCPCS

## 2024-12-17 PROCEDURE — G0151 HHCP-SERV OF PT,EA 15 MIN: HCPCS

## 2024-12-17 NOTE — Clinical Note
Patient with skin tear to right arm, SN would like to add weekly visit for 4 weeks total to monitor wound. Wound care to include saline rinse, xerofrom gauze and silicone border dressing every other day.

## 2024-12-18 ENCOUNTER — TELEPHONE (OUTPATIENT)
Dept: FAMILY MEDICINE CLINIC | Facility: CLINIC | Age: 79
End: 2024-12-18
Payer: MEDICARE

## 2024-12-18 VITALS
TEMPERATURE: 98 F | DIASTOLIC BLOOD PRESSURE: 56 MMHG | OXYGEN SATURATION: 96 % | HEART RATE: 57 BPM | SYSTOLIC BLOOD PRESSURE: 136 MMHG

## 2024-12-18 NOTE — TELEPHONE ENCOUNTER
ANNA FROM PHYSICAL THERAPY [id number] TO RECEIVE VERBAL ORDERS TO CONTINUE TO TREAT PATIENTS AND HE ALSO WANTED TO NOTIFY YOU THAT THE PATIENT HAD A FALL AS WELL

## 2024-12-18 NOTE — HOME HEALTH
Arrived today for PT discharge visit but patient sitting in his chair with a bloody right arm with a crude dressing over his right forearm.  Patient stated he fell last evening when his walker wheel seem to lock up and stopped him suddenly, causing him to fall forward over the walker.  He sustained a right forearm skin tear and hit is left shoulder.  I cleansed his right skin tear with sterile saline and it was still actively draining.  It was dressed with a non-adherent dressing and wrapped, but he needed a better dresssing.  I called my manager and his PCP office to get a nurse in to see him and was able to get the nurse out today to assess.  As far as his left shoulder, he could lift it above his head with compensatory movements, but unable to raise it into straight flexion.  No signs of a broken bone, but suspicious of a RTC injury.  I was able to assess his walker and found that the right front wheel was coming loose, causing it to lock up at times.  I was able to tighten the bolt and fix the loose wheel and had the patient use the walker again with no issue.  I will hold off the discharge and check on him next week.     Plan for next visit  Finalize HEP and assess for dc.

## 2024-12-19 ENCOUNTER — HOME CARE VISIT (OUTPATIENT)
Dept: HOME HEALTH SERVICES | Facility: HOME HEALTHCARE | Age: 79
End: 2024-12-19
Payer: COMMERCIAL

## 2024-12-19 VITALS
OXYGEN SATURATION: 98 % | TEMPERATURE: 96.9 F | SYSTOLIC BLOOD PRESSURE: 138 MMHG | RESPIRATION RATE: 18 BRPM | DIASTOLIC BLOOD PRESSURE: 60 MMHG | HEART RATE: 60 BPM

## 2024-12-19 PROCEDURE — G0299 HHS/HOSPICE OF RN EA 15 MIN: HCPCS

## 2024-12-19 NOTE — HOME HEALTH
Recent hospitalizations with cardioversion (returned to [facility]), fluid overload with SOA and incarcerated inguinal hernia repair.  Patient was PT only, but recently fell into rollator and suffered and skin tear from this on right arm.  It was bleeding quite a bit and PT felt patient needed SN to assess for wound care.  Pictures and measurements taken and then wound was rinsed with saline and applied pressure and then added xerform gauze, abd and compression.  No medication changes or questions.  No other medical questions or concerns from patient.  SN focus related to skin tear of right arm.    Of note: bleeding had been controlled at subsequent visit and orders changed to border dressing instead of compression so that every other day wound care was easier for patient to complete indepdently.

## 2024-12-20 ENCOUNTER — OFFICE VISIT (OUTPATIENT)
Dept: FAMILY MEDICINE CLINIC | Facility: CLINIC | Age: 79
End: 2024-12-20
Payer: MEDICARE

## 2024-12-20 VITALS
HEIGHT: 72 IN | DIASTOLIC BLOOD PRESSURE: 62 MMHG | BODY MASS INDEX: 36.3 KG/M2 | SYSTOLIC BLOOD PRESSURE: 134 MMHG | WEIGHT: 268 LBS

## 2024-12-20 VITALS
OXYGEN SATURATION: 97 % | DIASTOLIC BLOOD PRESSURE: 62 MMHG | HEIGHT: 73 IN | SYSTOLIC BLOOD PRESSURE: 134 MMHG | HEART RATE: 67 BPM | BODY MASS INDEX: 35.52 KG/M2 | WEIGHT: 268 LBS

## 2024-12-20 DIAGNOSIS — I48.19 PERSISTENT ATRIAL FIBRILLATION: ICD-10-CM

## 2024-12-20 DIAGNOSIS — I10 ESSENTIAL HYPERTENSION: ICD-10-CM

## 2024-12-20 DIAGNOSIS — R26.81 UNSTEADY GAIT WHEN WALKING: ICD-10-CM

## 2024-12-20 DIAGNOSIS — N17.9 ACUTE KIDNEY INJURY: ICD-10-CM

## 2024-12-20 DIAGNOSIS — Z09 STATUS POST LEFT INGUINAL HERNIA REPAIR, FOLLOW-UP EXAM: Chronic | ICD-10-CM

## 2024-12-20 DIAGNOSIS — E11.59 TYPE 2 DIABETES MELLITUS WITH OTHER CIRCULATORY COMPLICATION, WITHOUT LONG-TERM CURRENT USE OF INSULIN: ICD-10-CM

## 2024-12-20 DIAGNOSIS — D64.9 ANEMIA, UNSPECIFIED TYPE: ICD-10-CM

## 2024-12-20 DIAGNOSIS — S98.112A AMPUTATION OF LEFT GREAT TOE: ICD-10-CM

## 2024-12-20 DIAGNOSIS — Z09 HOSPITAL DISCHARGE FOLLOW-UP: Primary | ICD-10-CM

## 2024-12-20 DIAGNOSIS — Z79.01 CHRONIC ANTICOAGULATION: ICD-10-CM

## 2024-12-20 DIAGNOSIS — R29.898 WEAKNESS OF BOTH LOWER EXTREMITIES: ICD-10-CM

## 2024-12-20 DIAGNOSIS — E11.59 TYPE 2 DIABETES MELLITUS WITH OTHER CIRCULATORY COMPLICATION, WITHOUT LONG-TERM CURRENT USE OF INSULIN: Primary | ICD-10-CM

## 2024-12-20 DIAGNOSIS — S51.801D: ICD-10-CM

## 2024-12-20 LAB
EXPIRATION DATE: ABNORMAL
HBA1C MFR BLD: 6 % (ref 4.5–5.7)
Lab: ABNORMAL

## 2024-12-20 PROCEDURE — 1160F RVW MEDS BY RX/DR IN RCRD: CPT | Performed by: NURSE PRACTITIONER

## 2024-12-20 PROCEDURE — 1126F AMNT PAIN NOTED NONE PRSNT: CPT | Performed by: NURSE PRACTITIONER

## 2024-12-20 PROCEDURE — 3075F SYST BP GE 130 - 139MM HG: CPT | Performed by: NURSE PRACTITIONER

## 2024-12-20 PROCEDURE — 3078F DIAST BP <80 MM HG: CPT | Performed by: NURSE PRACTITIONER

## 2024-12-20 PROCEDURE — 99214 OFFICE O/P EST MOD 30 MIN: CPT | Performed by: NURSE PRACTITIONER

## 2024-12-20 RX ORDER — AMIODARONE HYDROCHLORIDE 200 MG/1
200 TABLET ORAL 2 TIMES DAILY WITH MEALS
Qty: 60 TABLET | Refills: 1 | Status: CANCELLED | OUTPATIENT
Start: 2024-12-20

## 2024-12-20 RX ORDER — AMLODIPINE BESYLATE 10 MG/1
1 TABLET ORAL DAILY
COMMUNITY
Start: 2024-12-02 | End: 2025-01-11 | Stop reason: ALTCHOICE

## 2024-12-20 NOTE — PROGRESS NOTES
Transitional Care Follow Up Visit  Subjective     Rock is a 79 y.o. male who presents for a transitional care management visit.    Within 48 business hours after discharge our office contacted him via telephone to coordinate his care and needs.      I reviewed and discussed the details of that call along with the discharge summary, hospital problems, inpatient lab results, inpatient diagnostic studies, and consultation reports with Rock.     Current outpatient and discharge medications have been reconciled for the patient.  Reviewed by: ZEINA Mccormack          11/30/2024     2:40 PM   Date of TCM Phone Call   [facility]   Date of Admission 11/25/2024   Date of Discharge 11/30/2024   Discharge Disposition Home or Self Care       Risk for Readmission (LACE) No data recorded    History of Present Illness     Course During Hospital Stay The following information was reviewed by: ZEINA Mccormack on 12/20/2024:        Hospital Course:  Rock Maciel Jr. is a 79 y.o. male  presents to the hospital with incarcerated inguinal hernia and general surgery team was able to reduce the hernia in the emergency room.  Patient was also found to have acute kidney injury on CKD 3B.     Patient was monitored carefully in the hospital with assistance of surgery consult team and nephrology consult team.  He ultimately underwent surgical intervention on 11/29/2024 and did well and is tolerating his diet and has been cleared by surgery to discharge home.  He will follow-up closely with the surgeon postoperatively for postoperative follow-up.  He is controlling his pain currently with Tylenol.     Patient had acute kidney injury with chronic kidney disease.  His renal function has stabilized and he was managed with the assistance of his nephrology consult team.  He will follow-up with them in clinic after his hospitalization and they will continue to monitor his laboratory studies in the outpatient basis.       Discussed with patient his stability and he indicated his wheelchair is at home and is currently nonfunctional.  He has requested a new wheelchair and I have ordered this through case management.      Continue amiodarone for atrial fibrillation.  Hold anticoagulation for surgical intervention and he has now been cleared to restart Eliquis per my conversation with surgery today and will start his first dose this evening.     Appreciate nephrology recommendations regarding KILLIAN and CKD.  Monitor blood pressure and continue current medications and adjust as needed.  Alpha-blocker initially was decreased while patient is acutely ill.  He was taking 5 mg at home and in the hospital we have had him on 1 mg dose.  Plan to discharge him on the 2 mg dose and recommend a blood pressure check at primary care follow-up.     Please note for anemia patient did receive iron infusion.     Tradjenta for diabetes.  A1c most recently was 6.5 in March.  Glucose reasonably controlled while hospitalized.  Primary care follow-up     PT for debility recommending home with home health.       At the time of discharge patient was told to take all medications as prescribed, keep all follow-up appointments, and call their doctor or return to the hospital with any worsening or concerning symptoms.     Please note that this note was made using Dragon voice recognition software .     Copied text on this note has been reviewed and revised by me on 12/20/24:.    S/P Incarcerated Left Inguinal Hernia Repair - he admits feeling much better after surgery to have left inguinal hernia repair.  He has not followed up with the surgeon since hospitalization.  Recommended he call and schedule a post-op follow-up appointment with Dr. Lazo, General Surgery, r/t Incarcerated Left Hernia Repair and he agreed.  He denies complaint of pain today.  He was given Tylenol to be taken PRN for pain control.    KILLIAN/CKD 3 b - He denies having any difficulty urinating  "and denies any urinary complaints.  He has not followed up with Nephrology since surgery.  Encouraged patient to schedule hospital follow-up with Nephrology as soon as possible and patient agreed.     A-Fib - Stable.  He denies having symptoms of heart palpitations, chest pain and SOB.  He is continuing to taking Amiodarone daily and was cleared to start back on Eliquis.    Hypertension - He is taking now taking Terazosin 2mg nightly instead of 5mg dose.  Will continue to monitor BP.      Anemia - patient received Iron infusion during hospitalization.    Glucose - His prior A1c was 6.5 on lab from 3/5/24.  His FBG ranges between .  He is taking Tradjenta 5mg daily and Glipizide 5mg BID without complaint.  Patient to follow-up with PCP for continued management of diabetes next visit.    Weakness/Unsteady Gait - he is unsteady on his feet.  He uses a rolling walker and wheelchair but his wheel chair is non-functioning and needs a new one.  A wheel chair was ordered prior to discharge from hospital throughout ..  He has one more home PT visit next week and then he thinks he will be released.     He had [facility] nurse is going to home for wound care on his right arm due to recent fall over his walker.            Vitals:    12/20/24 1530   BP: 134/62   BP Location: Left arm   Patient Position: Sitting   Cuff Size: Adult   Pulse: 67   SpO2: 97%   Weight: 122 kg (268 lb)   Height: 185.4 cm (72.99\")       Vitals:    12/20/24 1530   BP: 134/62   Pulse: 67   SpO2: 97%        Review of Systems   Constitutional:  Negative for chills and fever.   Respiratory:  Negative for shortness of breath and wheezing.    Cardiovascular:  Negative for chest pain and palpitations.   Gastrointestinal:  Negative for abdominal distention, abdominal pain, blood in stool, constipation, diarrhea, nausea and vomiting.   Genitourinary:  Negative for difficulty urinating.   Skin:  Positive for wound.        Wound right arm " "from fall       Objective   Physical Exam  Vitals and nursing note reviewed.   Constitutional:       General: He is not in acute distress.     Appearance: Normal appearance. He is obese.   HENT:      Head: Normocephalic and atraumatic.   Cardiovascular:      Rate and Rhythm: Normal rate and regular rhythm.      Heart sounds: Normal heart sounds. No murmur heard.  Pulmonary:      Effort: Pulmonary effort is normal. No respiratory distress.      Breath sounds: Normal breath sounds. No wheezing.   Abdominal:      General: Bowel sounds are normal. There is no distension.      Palpations: Abdomen is soft.      Tenderness: There is abdominal tenderness. There is no guarding or rebound.      Comments: \"Mild\" tenderness on palpation around abdominal incision sites; incision sites dry and intact with surrounding mild erythema; no drainage or inflammation.   Musculoskeletal:      Right lower leg: No edema.      Left lower leg: No edema.   Skin:     Comments: Right arm wound occluded with dressing   Neurological:      Mental Status: He is alert.      Motor: Weakness (BLE) present.      Gait: Gait abnormal.         Assessment & Plan     Diagnoses and all orders for this visit:    1. Hospital discharge follow-up (Primary)  Comments:  Follow-up on [facility] visit on 11/25/24 for post surgical incarcerated inguinal hernia and KILLIAN on CKD 3B.    2. Status post left inguinal hernia repair, follow-up exam  Assessment & Plan:  Asymptomatic; report feeling much better since surgery..  He has not followed up with the surgeon since hospitalization.  Recommended he call and schedule a post-op follow-up appointment with Dr. Lazo, General Surgery, r/t Incarcerated Left Hernia Repair and he agreed.      3. Acute kidney injury  Assessment & Plan:  Stable.  Encouraged patient to schedule hospital follow-up appointment with Nephrology as soon as possible and patient agreed.     Orders:  -     CBC & Differential; Future  -     " Comprehensive Metabolic Panel; Future    4. Essential hypertension  Assessment & Plan:  Hypertension is controlled.  Decrease table salt and foods high in salt.  Weight loss.  Increase activity daily.  Continue Amiodarone 200mg daily, Bumex 1mg BID, Hydralyzine 25mg TID, and Metoprolol Succ XL 25mg daily.   Blood pressure will be re-assessed in 3 months.      Orders:  -     CBC & Differential; Future  -     Comprehensive Metabolic Panel; Future  -     Lipid Panel; Future    5. Type 2 diabetes mellitus with other circulatory complication, without long-term current use of insulin  Assessment & Plan:  Diabetes is improving with treatment.  A1c 6.0 today.  Continue current treatment regimen.  Reminded to bring in blood sugar diary at next visit.  Recommended an ADA diet.  Continue Tradjenta 5mg daily and Glipizide 5mg BID with meals.  Diabetes will be re-assessed in 3 months    Orders:  -     CBC & Differential; Future  -     Comprehensive Metabolic Panel; Future    6. Persistent atrial fibrillation  Assessment & Plan:  Stable on Amiodarone 200mg daily  Continue current treatment plan.  Followed by Cardiology.    Orders:  -     Comprehensive Metabolic Panel; Future    7. Chronic anticoagulation  Assessment & Plan:  Patient instructed to continue Eliquis 2.5mg BID at discharge from hospital.  He confirmed he is taking Eliquis BID.  Continue current treatment plan.  Will continue to monitor.    Orders:  -     CBC & Differential; Future    8. Anemia, unspecified type  Assessment & Plan:  Decreased H & H on prior lab.  Patient given iron infusion during hospitalization.  Labs  Will continue to monitor.    Orders:  -     CBC & Differential; Future  -     Comprehensive Metabolic Panel; Future  -     Ferritin; Future  -     Iron Profile; Future  -     Reticulocytes; Future    9. Weakness of both lower extremities  Assessment & Plan:  PT working with patient in home.  Case Management is working on getting patient new wheel  chair because current wheel chair is not functioning.  Encouraged to continue following with physical therapy and he agreed.      10. Unsteady gait when walking  Assessment & Plan:  Patient uses rolling walker when ambulating.  Continue current treatment plan.  Patient is following by PT.  Will continue to monitor.      11. Wound, open, arm, forearm, right, subsequent encounter  Assessment & Plan:  Followed by [facility] Nurse for care.  Will continue to monitor.        Patient to return for labs.        Follow Up     No follow-ups on file.

## 2024-12-20 NOTE — PROGRESS NOTES
12/20/2024    Assessment & Plan     This pleasant 79-year-old patient presents at this time for diabetes follow-up of his foot.  He relates he is in no pain or discomfort at this time.  He has been currently seen by LARON Dickson for ongoing care.  He unfortunately has had partial amputations to both feet secondary to diabetic complications.    His blood pressure is 134/62 and left arm sitting position standard cuff.  His weight is 268 pounds.    His last A1c was excellent at 6.0.      Diagnoses and all orders for this visit:    1. Type 2 diabetes mellitus with other circulatory complication, without long-term current use of insulin (Primary)  -     Cancel: POCT glycated hemoglobin, total  -     POC Glycosylated Hemoglobin (Hb A1C)    2. Amputation of left great toe                  CC: Diabetes (DM foot exam for ONLY.)  .        HPI  Diabetes  Pertinent negatives for hypoglycemia include no dizziness. Pertinent negatives for diabetes include no chest pain.        Subjective   Rock Maciel Jr. is a 79 y.o. male.      The following portions of the patient's history were reviewed and updated as appropriate: allergies, current medications, past family history, past medical history, past social history, past surgical history, and problem list.    Problem List  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    Chronic venous insufficiency    Nonrheumatic aortic valve stenosis    Carotid stenosis, asymptomatic    Bradycardia    Hyperlipidemia    Bilateral carotid artery disease    Abnormal cardiovascular stress test    H/O aortic valve  replacement with porcine valve    Charcot-Davida-Tooth disease-like deformity of foot    Amputated toe of right foot    Fall    Non-traumatic rhabdomyolysis    S/P CABG x 2    CKD (chronic kidney disease) stage 3, GFR 30-59 ml/min    Rupture of left quadriceps tendon    Constipation    Wound of right leg    Anemia    Chronic diastolic (congestive) heart failure    Amputated toe of left foot    Gas gangrene    Cellulitis of left lower limb    Acquired absence of left foot    Bilateral lower extremity edema    Stage 3b chronic kidney disease    History of pneumonia    Atelectasis of both lungs    Abnormal pleural fluid    Pleural thickening    Atrial fibrillation, persistent    Chronic anticoagulation    Persistent atrial fibrillation    Bladder wall thickening    Hematuria    CKD (chronic kidney disease) stage 4, GFR 15-29 ml/min    Hypoxemia    Nocturnal hypoxemia       Past Medical History  Past Medical History:   Diagnosis Date    Allergic rhinitis     Bronchitis     Coronary artery disease     Diabetes mellitus 2001    DM (diabetes mellitus)     Encounter for annual health examination 05/06/2014    Annual Health Assessment    Gout     Hiatal hernia     History of MRSA infection     RIGHT FOOT 2010    Hyperlipidemia     Hypertension     Murmur     Pneumothorax on right     Sleep apnea     pt wears CPAP at night    Wellness examination 06/24/2015    Annual Wellness Visit       Surgical History  Past Surgical History:   Procedure Laterality Date    ARTERY SURGERY Bilateral     carotid    CARDIAC CATHETERIZATION N/A 7/20/2018    Procedure: Left Heart Cath;  Surgeon: Jo Toney MD;  Location:  [facility];  Service: Cardiovascular    CARDIAC CATHETERIZATION N/A 7/20/2018    Procedure: Coronary angiography;  Surgeon: Jo Toney MD;  Location:  [facility];  Service: Cardiovascular    CAROTID ENDARTERECTOMY Bilateral     COLONOSCOPY  2008    CORONARY ARTERY BYPASS  GRAFT WITH AORTIC VALVE REPAIR/REPLACEMENT N/A 7/23/2018    Procedure: INTRAOPERATIVE ALEX, MIDLINE STERNOTOMY, CORONARY ARTERY BYPASS GRAFTING X 3 USING ENDOSCOPICALLY HARVESTED LEFT GREATER SAPHENOUS VEIN,  AORTIC VALVE REPLACEMENT USING 25MM LOPEZ II ULTRA PORCINE VALVE, PRP;  Surgeon: Phong Posey MD;  Location: [facility] OR;  Service: Cardiothoracic    FOOT SURGERY Right 2010    5th digit removal    FOOT SURGERY Left 2011    1 digit removed    INCISION AND DRAINAGE LEG Right 3/28/2020    Procedure: DEBRIDMENT OF RIGHT CALF;  Surgeon: Ross Pineda MD;  Location: [facility] OR;  Service: Vascular;  Laterality: Right;    INGUINAL HERNIA REPAIR Left 11/29/2024    Procedure: INGUINAL HERNIA REPAIR LAPAROSCOPIC WITH DAVINCI ROBOT;  Surgeon: Phong Lazo MD;  Location: [facility] OR;  Service: Robotics - DaVinci;  Laterality: Left;    QUADRICEPS TENDON REPAIR Left 5/14/2019    Procedure: Left QUADRICEPS TENDON REPAIR;  Surgeon: Camilo Hunter MD;  Location: [facility] MAIN OR;  Service: Orthopedics    THORACENTESIS Right 11/21/2016    THORACOSCOPY Right 5/8/2017    Procedure: BRONCHOSCOPY, RIGHT VAT,  TOTAL DECORTICATION RIGHT LUNG, PLEURAL BX, PLACEMENT SUBPLEURAL PAIN CAATHETERS X2;  Surgeon: Donald Orlando III, MD;  Location: [facility] OR;  Service:     TONSILECTOMY, ADENOIDECTOMY, BILATERAL MYRINGOTOMY AND TUBES         Family History  Family History   Problem Relation Age of Onset    Hypertension Father     Malig Hyperthermia Neg Hx        Social History  Social History    Tobacco Use      Smoking status: Never        Passive exposure: Never      Smokeless tobacco: Never       Is the Patient a current tobacco user? No    Allergies  Allergies   Allergen Reactions    Ceclor [Cefaclor] Rash     Rash in his 50s; he tolerated piperacillin-tazobactam in March 2020       Current Medications    Current Outpatient Medications:     amiodarone (PACERONE) 200 MG tablet, Take 1 tablet by  mouth Daily., Disp: 60 tablet, Rfl: 1    amLODIPine (NORVASC) 10 MG tablet, Take 1 tablet by mouth Daily., Disp: , Rfl:     apixaban (ELIQUIS) 2.5 MG tablet tablet, Take 1 tablet by mouth 2 (Two) Times a Day. Indications: Atrial Fibrillation, Disp: 60 tablet, Rfl: 5    atorvastatin (LIPITOR) 20 MG tablet, Take 1 tablet by mouth Every Night. NEED TO SEE PROVIDER FOR FUTURE REFILLS., Disp: 90 tablet, Rfl: 1    bumetanide (BUMEX) 1 MG tablet, Take 1 tablet by mouth 2 (Two) Times a Day., Disp: 60 tablet, Rfl: 5    Cholecalciferol 25 MCG (1000 UT) tablet, Take 1 tablet by mouth Daily., Disp: , Rfl:     clopidogrel (PLAVIX) 75 MG tablet, Take 1 tablet by mouth Daily., Disp: 90 tablet, Rfl: 1    glipizide (GLUCOTROL) 5 MG tablet, TAKE 1 TABLET BY MOUTH 2 TIMES A DAY BEFORE MEALS., Disp: 180 tablet, Rfl: 1    glucose blood (Accu-Chek Keshia Plus) test strip, USE TO TEST BLOOD SUGAR    TWICE DAILY FOR DIABETES, Disp: 100 each, Rfl: 8    hydrALAZINE (APRESOLINE) 25 MG tablet, Take 1 tablet by mouth 3 (Three) Times a Day., Disp: 90 tablet, Rfl: 5    linagliptin (Tradjenta) 5 MG tablet tablet, Take 1 tablet by mouth Daily., Disp: 90 tablet, Rfl: 1    metoprolol succinate XL (TOPROL-XL) 25 MG 24 hr tablet, Take 1 tablet by mouth Daily., Disp: 30 tablet, Rfl: 5    vitamin C (ASCORBIC ACID) 250 MG tablet, Take 1 tablet by mouth Daily., Disp: , Rfl:     Zinc 50 MG tablet, , Disp: , Rfl:     acetaminophen (TYLENOL) 325 MG tablet, Take 2 tablets by mouth Every 6 (Six) Hours As Needed for Mild Pain. (Patient not taking: Reported on 12/20/2024), Disp: , Rfl:     nitroglycerin (NITROSTAT) 0.4 MG SL tablet, Place 1 tablet under the tongue Every 5 (Five) Minutes As Needed for Chest Pain (Only if SBP Greater Than 100). Take no more than 3 doses in 15 minutes. (Patient not taking: Reported on 12/20/2024), Disp: 25 tablet, Rfl: 0    terazosin (HYTRIN) 2 MG capsule, Take 1 capsule by mouth Every Night., Disp: 90 capsule, Rfl: 1     Review of  System  Review of Systems   Constitutional:  Negative for chills and fever.   Respiratory:  Negative for cough and shortness of breath.    Cardiovascular:  Negative for chest pain and palpitations.   Gastrointestinal:  Negative for constipation, diarrhea, nausea and vomiting.   Neurological:  Negative for dizziness and headache.     I have reviewed and confirmed the accuracy of the ROS as documented by the MA/LPN/RN Victor Manuel Schmitt MD    Vitals:    12/20/24 1544   BP: 134/62     Body mass index is 36.35 kg/m².    Objective     Physical Exam  Physical Exam  Constitutional:       Appearance: Normal appearance.   HENT:      Head: Normocephalic and atraumatic.   Musculoskeletal:      Cervical back: Normal range of motion and neck supple.   Skin:     General: Skin is warm and dry.   Neurological:      General: No focal deficit present.      Mental Status: He is alert and oriented to person, place, and time.   Psychiatric:         Mood and Affect: Mood normal.         Behavior: Behavior normal.           Victor Manuel Schmitt MD  12/20/2024

## 2024-12-23 ENCOUNTER — HOME CARE VISIT (OUTPATIENT)
Dept: HOME HEALTH SERVICES | Facility: HOME HEALTHCARE | Age: 79
End: 2024-12-23
Payer: COMMERCIAL

## 2024-12-23 VITALS
RESPIRATION RATE: 18 BRPM | HEART RATE: 58 BPM | SYSTOLIC BLOOD PRESSURE: 132 MMHG | DIASTOLIC BLOOD PRESSURE: 50 MMHG | TEMPERATURE: 96.5 F | OXYGEN SATURATION: 95 %

## 2024-12-23 PROCEDURE — G0299 HHS/HOSPICE OF RN EA 15 MIN: HCPCS

## 2024-12-23 RX ORDER — TERAZOSIN 2 MG/1
2 CAPSULE ORAL NIGHTLY
Qty: 30 CAPSULE | Refills: 0 | Status: CANCELLED | OUTPATIENT
Start: 2024-12-23

## 2024-12-26 ENCOUNTER — HOME CARE VISIT (OUTPATIENT)
Dept: HOME HEALTH SERVICES | Facility: HOME HEALTHCARE | Age: 79
End: 2024-12-26
Payer: COMMERCIAL

## 2024-12-26 VITALS
SYSTOLIC BLOOD PRESSURE: 152 MMHG | OXYGEN SATURATION: 98 % | DIASTOLIC BLOOD PRESSURE: 60 MMHG | HEART RATE: 64 BPM | TEMPERATURE: 64 F

## 2024-12-26 PROCEDURE — G0151 HHCP-SERV OF PT,EA 15 MIN: HCPCS

## 2024-12-26 NOTE — HOME HEALTH
Patient states his left shoulder feels better.  He is moving it into full ROM with only mild soreness. He states he is back to his baseline mobility.  No further falls and able to get up and downstairs as well as to his car. I rechecked his front, right walker wheel (the one that was responsible for his fall last week) and it was still in good repair from the fix last week.   His right forearm skin tear is healing well and SN will most likely finish treatment next week.  PT discharge.

## 2024-12-27 ENCOUNTER — TELEPHONE (OUTPATIENT)
Dept: CARDIOLOGY | Facility: CLINIC | Age: 79
End: 2024-12-27
Payer: MEDICARE

## 2024-12-27 RX ORDER — TERAZOSIN 2 MG/1
2 CAPSULE ORAL NIGHTLY
Qty: 90 CAPSULE | Refills: 1 | Status: SHIPPED | OUTPATIENT
Start: 2024-12-27

## 2024-12-27 NOTE — TELEPHONE ENCOUNTER
Caller: Rock Maciel Jr.    Relationship: Self    Best call back number: [phone]     What medication are you requesting:   TERAZOSIN 2MG     If a prescription is needed, what is your preferred pharmacy and phone number: [facility]     Additional notes:

## 2024-12-27 NOTE — TELEPHONE ENCOUNTER
----- Message from Carol REAL sent at 12/27/2024 11:34 AM EST -----  Regarding: OXYGEN  Contact: [phone]  PT SAID HE DOESN'T NEED THE OXYGEN ANYMORE THAT DR ENCARNACION PRESCRIBED FOR HIM IN THE HOSPITAL, THE [facility] [phone] FROM WHERE THE OXYGEN FROM SAYS THEY STEFAN SOMETHING THAT SAYS HE CAN D/C IT SO HE DOESN'T GET CHARGED.      LVM TO CALL BACK- (WE DID NOT ORDER HIS OXYGEN HE NEEDS TO CHECK WITH HIS PULMONARY OR PCP)

## 2024-12-30 ENCOUNTER — HOME CARE VISIT (OUTPATIENT)
Dept: HOME HEALTH SERVICES | Facility: HOME HEALTHCARE | Age: 79
End: 2024-12-30
Payer: COMMERCIAL

## 2024-12-30 VITALS
HEART RATE: 57 BPM | RESPIRATION RATE: 18 BRPM | TEMPERATURE: 96.5 F | DIASTOLIC BLOOD PRESSURE: 70 MMHG | OXYGEN SATURATION: 97 % | SYSTOLIC BLOOD PRESSURE: 142 MMHG

## 2024-12-30 PROCEDURE — G0299 HHS/HOSPICE OF RN EA 15 MIN: HCPCS

## 2024-12-31 NOTE — HOME HEALTH
Patient skin tear has been healing well, going to follow up one more time this week and plan to discharge based on how wound looks.  Picture and measurement taken.  CP assessment WNL.  No reported med changes or questions.  Next visit: plan to discharge.

## 2025-01-03 ENCOUNTER — HOME CARE VISIT (OUTPATIENT)
Dept: HOME HEALTH SERVICES | Facility: HOME HEALTHCARE | Age: 80
End: 2025-01-03
Payer: COMMERCIAL

## 2025-01-03 VITALS
OXYGEN SATURATION: 98 % | SYSTOLIC BLOOD PRESSURE: 142 MMHG | HEART RATE: 50 BPM | RESPIRATION RATE: 18 BRPM | TEMPERATURE: 96 F | DIASTOLIC BLOOD PRESSURE: 88 MMHG

## 2025-01-03 PROCEDURE — G0299 HHS/HOSPICE OF RN EA 15 MIN: HCPCS

## 2025-01-05 PROBLEM — R29.898 WEAKNESS OF BOTH LOWER EXTREMITIES: Status: ACTIVE | Noted: 2025-01-05

## 2025-01-05 PROBLEM — Z09 STATUS POST LEFT INGUINAL HERNIA REPAIR, FOLLOW-UP EXAM: Chronic | Status: ACTIVE | Noted: 2025-01-05

## 2025-01-05 PROBLEM — R26.81 UNSTEADY GAIT WHEN WALKING: Status: ACTIVE | Noted: 2025-01-05

## 2025-01-06 PROBLEM — S51.801D: Status: ACTIVE | Noted: 2025-01-06

## 2025-01-06 NOTE — ASSESSMENT & PLAN NOTE
Followed by [facility] [place], operated by [facility] Nurse for care.  Will continue to monitor.

## 2025-01-06 NOTE — ASSESSMENT & PLAN NOTE
Decreased H & H on prior lab.  Patient given iron infusion during hospitalization.  Labs  Will continue to monitor.

## 2025-01-06 NOTE — ASSESSMENT & PLAN NOTE
Hypertension is controlled.  Decrease table salt and foods high in salt.  Weight loss.  Increase activity daily.  Continue Amiodarone 200mg daily, Bumex 1mg BID, Hydralyzine 25mg TID, and Metoprolol Succ XL 25mg daily.   Blood pressure will be re-assessed in 3 months.

## 2025-01-06 NOTE — ASSESSMENT & PLAN NOTE
Stable; Cr 1.95, eGFR 34.1, BUN 32 on prior lab.  Decrease table salt and foods high in salt.  Increase water (within restriction), decrease caffeine.  Increase activity daily as tolerated.  Avoid NSAID use.  Patient confirmed he is taking Terazosin 2mg nightly and Bumex 1mg daily.  Continue current treatment plan.  Followed by Dr. Talavera, Nephrology.  Advised to schedule post op follow-up appointment with Nephrology as soon as possible and patient agreed.

## 2025-01-06 NOTE — ASSESSMENT & PLAN NOTE
Stable.  Encouraged patient to schedule hospital follow-up appointment with Nephrology as soon as possible and patient agreed.

## 2025-01-06 NOTE — ASSESSMENT & PLAN NOTE
Diabetes is improving with treatment.  A1c 6.0 today.  Continue current treatment regimen.  Reminded to bring in blood sugar diary at next visit.  Recommended an ADA diet.  Continue Tradjenta 5mg daily and Glipizide 5mg BID with meals.  Diabetes will be re-assessed in 3 months

## 2025-01-06 NOTE — ASSESSMENT & PLAN NOTE
Patient uses rolling walker when ambulating.  Continue current treatment plan.  Patient is following by PT.  Will continue to monitor.

## 2025-01-06 NOTE — ASSESSMENT & PLAN NOTE
Asymptomatic; report feeling much better since surgery..  He has not followed up with the surgeon since hospitalization.  Recommended he call and schedule a post-op follow-up appointment with Dr. Lazo, General Surgery, r/t Incarcerated Left Hernia Repair and he agreed.

## 2025-01-06 NOTE — ASSESSMENT & PLAN NOTE
Patient instructed to continue Eliquis 2.5mg BID at discharge from hospital.  He confirmed he is taking Eliquis BID.  Continue current treatment plan.  Will continue to monitor.

## 2025-01-06 NOTE — HOME HEALTH
Wound has completely healed.  Advised patient that he can wear dressing for protection for another few days if he would like.  CP assessment WNL.  No medication changes.  No further questions or concerns from patient prior to discharge.

## 2025-01-06 NOTE — ASSESSMENT & PLAN NOTE
PT working with patient in home.  Case Management is working on getting patient new wheel chair because current wheel chair is not functioning.  Encouraged to continue following with physical therapy and he agreed.

## 2025-01-09 DIAGNOSIS — E11.59 TYPE 2 DIABETES MELLITUS WITH OTHER CIRCULATORY COMPLICATION, WITHOUT LONG-TERM CURRENT USE OF INSULIN: Chronic | ICD-10-CM

## 2025-01-09 RX ORDER — AMIODARONE HYDROCHLORIDE 200 MG/1
200 TABLET ORAL DAILY
Qty: 30 TABLET | Refills: 5 | Status: SHIPPED | OUTPATIENT
Start: 2025-01-09

## 2025-01-11 RX ORDER — AMLODIPINE BESYLATE 10 MG/1
10 TABLET ORAL DAILY
Qty: 30 TABLET | Refills: 0 | OUTPATIENT
Start: 2025-01-11

## 2025-01-11 NOTE — PROGRESS NOTES
PROGRESS NOTE      Patient Name: Rock Maciel Jr.  : 1945  MRN: [id number]  Primary Care Physician: Denise Dickson APRN  Date of admission: 2024    Patient Care Team:  Denise Dickson APRN as PCP - General (Nurse Practitioner)  Azam Banegas Jr., MD as Consulting Physician (Cardiology)  Jamil Stevens MD as Consulting Physician (Nephrology)        Subjective   Subjective:     Patient seen and examined, edema much better  Great UOP  > 2L  Patient feels dyspnea improving   Review of systems:  No complaints of any fever chills chest pain, some swelling of the legs, no abdominal pain nausea vomiting.      Allergies:    Allergies   Allergen Reactions    Ceclor [Cefaclor] Rash     Rash in his 50s; he tolerated piperacillin-tazobactam in 2020       Objective   Exam:     Vital Signs  Temp:  [98.1 °F (36.7 °C)-98.4 °F (36.9 °C)] 98.2 °F (36.8 °C)  Heart Rate:  [65-73] 66  Resp:  [18] 18  BP: (114-132)/(55-63) 132/55  SpO2:  [84 %-100 %] 93 %  on  Flow (L/min) (Oxygen Therapy):  [1-3] 2;   Device (Oxygen Therapy): nasal cannula  Body mass index is 36.69 kg/m².    General: Elderly male in no acute distress.    Lungs:    Clear bilaterally to auscultation.    Heart:      Regular rate and rhythm,  Abd:        Soft, nontender, s   Pulses:   Pulses palpable  Extr:        edema present  Neuro:   AAOx 3    Results Review:  I have personally reviewed most recent Data :  CBC          CMP   Results from last 7 days   Lab Units 11/15/24  0442 24  0557 24  0417 24  1304 24  0438 24  0602 11/10/24  0801   SODIUM mmol/L 139 134* 140 139 142 143 142   POTASSIUM mmol/L 3.8 3.9 3.8 4.2 3.9 4.2 4.3   CHLORIDE mmol/L 95* 93* 100 98 101 102 104   CO2 mmol/L 33.0* 33.0* 32.2* 29.4* 29.5* 29.9* 29.1*   BUN mg/dL 59* 56* 55* 55* 54* 59* 55*   CREATININE mg/dL 2.69* 2.48* 2.62* 2.43* 2.46* 2.34* 2.19*   GLUCOSE mg/dL 70 62* 87 214* 78 70 101*   ALBUMIN g/dL 3.2* 3.4* 3.0* 3.5  --    BILIRUBIN mg/dL 0.5 0.6 0.6 0.8  --   --   --    ALK PHOS U/L 91 90 83 91  --   --   --    AST (SGOT) U/L 13 12 12 11  --   --   --    ALT (SGPT) U/L 15 13 9 10  --   --   --      ABG      No radiology results for the last day    Results for orders placed during the hospital encounter of 10/03/24    Adult Transthoracic Echo Complete W/ Cont if Necessary Per Protocol    Interpretation Summary    Left ventricular ejection fraction appears to be 61 - 65%.    Left ventricular diastolic function was indeterminate.    The left atrial cavity is moderately dilated.    Left atrial volume is moderately increased.    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    Estimated right ventricular systolic pressure from tricuspid regurgitation is moderately elevated (45-55 mmHg).    Scheduled Meds:amiodarone, 200 mg, Oral, BID With Meals  apixaban, 2.5 mg, Oral, BID  atorvastatin, 20 mg, Oral, Nightly  bumetanide, 1 mg, Oral, BID  clopidogrel, 75 mg, Oral, Daily  glipizide, 5 mg, Oral, BID AC  [START ON 11/16/2024] hydrALAZINE, 25 mg, Oral, TID  linagliptin, 5 mg, Oral, Daily  [Held by provider] losartan, 75 mg, Oral, Daily  metoprolol succinate XL, 25 mg, Oral, Q24H  sodium chloride, 10 mL, Intravenous, Q12H  terazosin, 5 mg, Oral, Nightly      Continuous Infusions:   PRN Meds:  senna-docusate sodium **AND** polyethylene glycol **AND** bisacodyl **AND** bisacodyl    Calcium Replacement - Follow Nurse / BPA Driven Protocol    Magnesium Standard Dose Replacement - Follow Nurse / BPA Driven Protocol    nitroglycerin    Phosphorus Replacement - Follow Nurse / BPA Driven Protocol    Potassium Replacement - Follow Nurse / BPA Driven Protocol    sodium chloride    sodium chloride    Assessment & Plan   Assessment and Plan:         Persistent atrial fibrillation    ASSESSMENT:  Acute kidney injury  Baseline CKD stage III  Volume overload  Atrial fibrillation  Coronary artery disease status post bypass surgery  Anemia  Status  post AVR 2019  History of hypertension  Echocardiogram October 2024 ejection fraction 61 to 65%  Bilateral feet surgery    Plan:     Renal function overall about the same, somewhat labile with volume, sCr 2.69  Acute kidney injury most likely hemodynamic mediated cardiorenal etiology, prerenal with volume overload  Baseline creatinine around 1.4 to 1.7 mg/dL, admission creatinine 3.1 mg/dL, creatinine overall stable with great UOP  Will continue current dose of bumex 1 mg twice daily, adjust further as outpatient.   Monitor mild alkalosis.   Restart angiotensin receptor blocker as outpatient  BP well controlled with current agents   Low-grade proteinuria continue to monitor  Renal ultrasound, limited study, left kidney obscured by overlying shadowing, trabeculated appearance of the bladder  CT AP WO shows slight increase in left renal cyst, cannot exclude RCC, bladder trabeculated with multiple diverticula, mod left inguinal hernia. Follow up as outpatient   Need to see urologist for for possibility of RCC can be arranged as an outpatient  Followed by cardiology, evaluation noted  Overall seems like gradually improving   Follow up with dr. Talavera as per schedule   Although creatinine is higher than yesterday but likely because of underlying volume status as clinically better I think renal function will get better once patient volume status go back to baseline    Note transcribed for Dr Lerner    Electronically signed by ZEINA Story [facility] consultant  [phone]  11/15/2024  12:19 EST

## 2025-01-29 ENCOUNTER — TELEPHONE (OUTPATIENT)
Dept: FAMILY MEDICINE CLINIC | Facility: CLINIC | Age: 80
End: 2025-01-29

## 2025-01-29 NOTE — TELEPHONE ENCOUNTER
Caller: SMART AUBREE SALEHLENE    Relationship: Other    Best call back number: [phone]     What form or medical record are you requesting: STATEMENT OF CERTIFICATION FOR DIABETES    Who is requesting this form or medical record from you: IVÁN    How would you like to receive the form or medical records (pick-up, mail, fax): FAX  If fax, what is the fax number: [phone]  If mail, what is the address:   If pick-up, provide patient with address and location details    Timeframe paperwork needed: ASAP    Additional notes: PAPERWORK HAS BEEN RECEIVED, BUT IT WAS FILLED OUT INCORRECTLY. THE PROVIDER INFORMATION AT THE TOP OF THE FILLED OUT PAPERWORK NEEDS TO BE CHANGED FROM JESUS PASTRANA THE NAME AND NPI NUMBER TO DR. CASTELLANOS AND INITIALED BESIDE THE CHANGES.

## 2025-02-03 RX ORDER — ATORVASTATIN CALCIUM 20 MG/1
20 TABLET, FILM COATED ORAL NIGHTLY
Qty: 90 TABLET | Refills: 1 | Status: SHIPPED | OUTPATIENT
Start: 2025-02-03 | End: 2025-02-07 | Stop reason: SDUPTHER

## 2025-02-03 NOTE — TELEPHONE ENCOUNTER
Caller: Rock Maciel Jr.    Relationship: Self    Best call back number: [phone]    Requested Prescriptions:   Requested Prescriptions     Pending Prescriptions Disp Refills    atorvastatin (LIPITOR) 20 MG tablet 90 tablet 1     Sig: Take 1 tablet by mouth Every Night. NEED TO SEE PROVIDER FOR FUTURE REFILLS.        Pharmacy where request should be sent:  [facility]/pharmacy #[id number] - [place], [place] - [address] AT [facility] - [phone] [facility] [phone] FX     Last office visit with prescribing clinician: 12/20/2024   Last telemedicine visit with prescribing clinician: Visit date not found   Next office visit with prescribing clinician: 3/21/2025     Additional details provided by patient:     TOOK LAST ONE YESTERDAY     MARSHALL Landrum   02/03/25 10:20 EST

## 2025-02-07 ENCOUNTER — TELEPHONE (OUTPATIENT)
Dept: FAMILY MEDICINE CLINIC | Facility: CLINIC | Age: 80
End: 2025-02-07
Payer: MEDICARE

## 2025-02-07 DIAGNOSIS — E78.2 MIXED HYPERLIPIDEMIA: Primary | ICD-10-CM

## 2025-02-07 RX ORDER — ATORVASTATIN CALCIUM 20 MG/1
20 TABLET, FILM COATED ORAL NIGHTLY
Qty: 30 TABLET | Refills: 0 | Status: SHIPPED | OUTPATIENT
Start: 2025-02-07

## 2025-02-07 NOTE — TELEPHONE ENCOUNTER
Caller: Rock Maciel Jr.    Relationship: Self    Best call back number: [phone]     What was the call regarding: PATIENT HAD REQUESTED A REFILL OF ATORVASTIN AND IT WAS SENT TO [facility]. HE IS COMPLETELY OUT OF MEDICATION AND REQUESTING A SHORT SUPPLY TO BE SENT TO [facility]       [id number] - [place], [place] - [address] [place] - [phone]  - [phone]  [phone]     atorvastatin (LIPITOR) 20 MG tablet

## 2025-02-07 NOTE — TELEPHONE ENCOUNTER
Called patient and left voice mail message that I have sent 30 day Rx Atorvastatin 20mg to his [facility] pharmacy.

## 2025-03-03 ENCOUNTER — APPOINTMENT (OUTPATIENT)
Dept: GENERAL RADIOLOGY | Facility: HOSPITAL | Age: 80
End: 2025-03-03
Payer: MEDICARE

## 2025-03-03 ENCOUNTER — APPOINTMENT (OUTPATIENT)
Dept: ULTRASOUND IMAGING | Facility: HOSPITAL | Age: 80
End: 2025-03-03
Payer: MEDICARE

## 2025-03-03 ENCOUNTER — APPOINTMENT (OUTPATIENT)
Dept: CT IMAGING | Facility: HOSPITAL | Age: 80
End: 2025-03-03
Payer: MEDICARE

## 2025-03-03 ENCOUNTER — HOSPITAL ENCOUNTER (INPATIENT)
Facility: HOSPITAL | Age: 80
LOS: 16 days | Discharge: NURSING, REHAB OR LONG-TERM CARE | End: 2025-03-19
Attending: EMERGENCY MEDICINE | Admitting: INTERNAL MEDICINE
Payer: MEDICARE

## 2025-03-03 DIAGNOSIS — N17.9 ACUTE RENAL FAILURE SUPERIMPOSED ON CHRONIC KIDNEY DISEASE, UNSPECIFIED ACUTE RENAL FAILURE TYPE, UNSPECIFIED CKD STAGE: ICD-10-CM

## 2025-03-03 DIAGNOSIS — Z78.9 DECREASED ACTIVITIES OF DAILY LIVING (ADL): ICD-10-CM

## 2025-03-03 DIAGNOSIS — M86.071 ACUTE HEMATOGENOUS OSTEOMYELITIS OF RIGHT FOOT: ICD-10-CM

## 2025-03-03 DIAGNOSIS — D64.9 ANEMIA, UNSPECIFIED TYPE: ICD-10-CM

## 2025-03-03 DIAGNOSIS — N17.9 ACUTE KIDNEY INJURY: ICD-10-CM

## 2025-03-03 DIAGNOSIS — N18.6 END STAGE RENAL DISEASE: ICD-10-CM

## 2025-03-03 DIAGNOSIS — N18.9 ACUTE RENAL FAILURE SUPERIMPOSED ON CHRONIC KIDNEY DISEASE, UNSPECIFIED ACUTE RENAL FAILURE TYPE, UNSPECIFIED CKD STAGE: ICD-10-CM

## 2025-03-03 DIAGNOSIS — J18.9 PNEUMONIA DUE TO INFECTIOUS ORGANISM, UNSPECIFIED LATERALITY, UNSPECIFIED PART OF LUNG: ICD-10-CM

## 2025-03-03 DIAGNOSIS — A41.9 SEPSIS, DUE TO UNSPECIFIED ORGANISM, UNSPECIFIED WHETHER ACUTE ORGAN DYSFUNCTION PRESENT: ICD-10-CM

## 2025-03-03 DIAGNOSIS — T79.6XXA TRAUMATIC RHABDOMYOLYSIS, INITIAL ENCOUNTER: Primary | ICD-10-CM

## 2025-03-03 DIAGNOSIS — E11.628 DIABETIC FOOT INFECTION: ICD-10-CM

## 2025-03-03 DIAGNOSIS — S42.032A CLOSED DISPLACED FRACTURE OF ACROMIAL END OF LEFT CLAVICLE, INITIAL ENCOUNTER: ICD-10-CM

## 2025-03-03 DIAGNOSIS — N17.0 ATN (ACUTE TUBULAR NECROSIS): ICD-10-CM

## 2025-03-03 DIAGNOSIS — N18.32 STAGE 3B CHRONIC KIDNEY DISEASE: ICD-10-CM

## 2025-03-03 DIAGNOSIS — R73.9 HYPERGLYCEMIA: ICD-10-CM

## 2025-03-03 DIAGNOSIS — L08.9 DIABETIC FOOT INFECTION: ICD-10-CM

## 2025-03-03 PROBLEM — S42.002A CLOSED DISPLACED FRACTURE OF LEFT CLAVICLE: Status: ACTIVE | Noted: 2025-03-03

## 2025-03-03 LAB
ALBUMIN SERPL-MCNC: 3.4 G/DL (ref 3.5–5.2)
ALBUMIN/GLOB SERPL: 0.9 G/DL
ALP SERPL-CCNC: 126 U/L (ref 39–117)
ALT SERPL W P-5'-P-CCNC: 29 U/L (ref 1–41)
ANION GAP SERPL CALCULATED.3IONS-SCNC: 20 MMOL/L (ref 5–15)
APTT PPP: 33 SECONDS (ref 22.7–35.4)
AST SERPL-CCNC: 97 U/L (ref 1–40)
BILIRUB SERPL-MCNC: 0.7 MG/DL (ref 0–1.2)
BUN SERPL-MCNC: 64 MG/DL (ref 8–23)
BUN/CREAT SERPL: 15.2 (ref 7–25)
CALCIUM SPEC-SCNC: 8.8 MG/DL (ref 8.6–10.5)
CHLORIDE SERPL-SCNC: 100 MMOL/L (ref 98–107)
CK SERPL-CCNC: 4441 U/L (ref 20–200)
CK SERPL-CCNC: 5033 U/L (ref 20–200)
CO2 SERPL-SCNC: 16 MMOL/L (ref 22–29)
CREAT SERPL-MCNC: 4.22 MG/DL (ref 0.76–1.27)
D-LACTATE SERPL-SCNC: 1.6 MMOL/L (ref 0.5–2)
DEPRECATED RDW RBC AUTO: 47.5 FL (ref 37–54)
EGFRCR SERPLBLD CKD-EPI 2021: 13.5 ML/MIN/1.73
ELLIPTOCYTES BLD QL SMEAR: ABNORMAL
ERYTHROCYTE [DISTWIDTH] IN BLOOD BY AUTOMATED COUNT: 14.2 % (ref 12.3–15.4)
GLOBULIN UR ELPH-MCNC: 3.8 GM/DL
GLUCOSE BLDC GLUCOMTR-MCNC: 207 MG/DL (ref 70–130)
GLUCOSE SERPL-MCNC: 197 MG/DL (ref 65–99)
HBA1C MFR BLD: 6 % (ref 4.8–5.6)
HCT VFR BLD AUTO: 29.4 % (ref 37.5–51)
HGB BLD-MCNC: 9.5 G/DL (ref 13–17.7)
HOLD SPECIMEN: NORMAL
HOLD SPECIMEN: NORMAL
INR PPP: 2 (ref 0.9–1.1)
LYMPHOCYTES # BLD MANUAL: 0.25 10*3/MM3 (ref 0.7–3.1)
LYMPHOCYTES NFR BLD MANUAL: 6.1 % (ref 5–12)
MCH RBC QN AUTO: 29.6 PG (ref 26.6–33)
MCHC RBC AUTO-ENTMCNC: 32.3 G/DL (ref 31.5–35.7)
MCV RBC AUTO: 91.6 FL (ref 79–97)
METAMYELOCYTES NFR BLD MANUAL: 2 % (ref 0–0)
MONOCYTES # BLD: 1.52 10*3/MM3 (ref 0.1–0.9)
MYELOCYTES NFR BLD MANUAL: 1 % (ref 0–0)
NEUTROPHILS # BLD AUTO: 22.44 10*3/MM3 (ref 1.7–7)
NEUTROPHILS NFR BLD MANUAL: 89.8 % (ref 42.7–76)
NRBC BLD AUTO-RTO: 0 /100 WBC (ref 0–0.2)
OVALOCYTES BLD QL SMEAR: ABNORMAL
PLAT MORPH BLD: NORMAL
PLATELET # BLD AUTO: 253 10*3/MM3 (ref 140–450)
PMV BLD AUTO: 10.7 FL (ref 6–12)
POIKILOCYTOSIS BLD QL SMEAR: ABNORMAL
POTASSIUM SERPL-SCNC: 4.9 MMOL/L (ref 3.5–5.2)
PROCALCITONIN SERPL-MCNC: 4.8 NG/ML (ref 0–0.25)
PROT SERPL-MCNC: 7.2 G/DL (ref 6–8.5)
PROTHROMBIN TIME: 22.8 SECONDS (ref 11.7–14.2)
RBC # BLD AUTO: 3.21 10*6/MM3 (ref 4.14–5.8)
SODIUM SERPL-SCNC: 136 MMOL/L (ref 136–145)
VARIANT LYMPHS NFR BLD MANUAL: 1 % (ref 19.6–45.3)
WBC MORPH BLD: NORMAL
WBC NRBC COR # BLD AUTO: 24.99 10*3/MM3 (ref 3.4–10.8)
WHOLE BLOOD HOLD COAG: NORMAL
WHOLE BLOOD HOLD SPECIMEN: NORMAL

## 2025-03-03 PROCEDURE — 83036 HEMOGLOBIN GLYCOSYLATED A1C: CPT | Performed by: INTERNAL MEDICINE

## 2025-03-03 PROCEDURE — 83605 ASSAY OF LACTIC ACID: CPT | Performed by: EMERGENCY MEDICINE

## 2025-03-03 PROCEDURE — 85025 COMPLETE CBC W/AUTO DIFF WBC: CPT | Performed by: EMERGENCY MEDICINE

## 2025-03-03 PROCEDURE — 80053 COMPREHEN METABOLIC PANEL: CPT | Performed by: EMERGENCY MEDICINE

## 2025-03-03 PROCEDURE — 63710000001 INSULIN LISPRO (HUMAN) PER 5 UNITS: Performed by: INTERNAL MEDICINE

## 2025-03-03 PROCEDURE — 85610 PROTHROMBIN TIME: CPT | Performed by: EMERGENCY MEDICINE

## 2025-03-03 PROCEDURE — 70450 CT HEAD/BRAIN W/O DYE: CPT

## 2025-03-03 PROCEDURE — 71045 X-RAY EXAM CHEST 1 VIEW: CPT

## 2025-03-03 PROCEDURE — 82550 ASSAY OF CK (CPK): CPT | Performed by: EMERGENCY MEDICINE

## 2025-03-03 PROCEDURE — 25010000002 VANCOMYCIN 10 G RECONSTITUTED SOLUTION: Performed by: EMERGENCY MEDICINE

## 2025-03-03 PROCEDURE — 99291 CRITICAL CARE FIRST HOUR: CPT

## 2025-03-03 PROCEDURE — P9612 CATHETERIZE FOR URINE SPEC: HCPCS

## 2025-03-03 PROCEDURE — 25010000002 PIPERACILLIN SOD-TAZOBACTAM PER 1 G: Performed by: EMERGENCY MEDICINE

## 2025-03-03 PROCEDURE — 73060 X-RAY EXAM OF HUMERUS: CPT

## 2025-03-03 PROCEDURE — 36415 COLL VENOUS BLD VENIPUNCTURE: CPT | Performed by: EMERGENCY MEDICINE

## 2025-03-03 PROCEDURE — 93010 ELECTROCARDIOGRAM REPORT: CPT | Performed by: INTERNAL MEDICINE

## 2025-03-03 PROCEDURE — 85007 BL SMEAR W/DIFF WBC COUNT: CPT | Performed by: EMERGENCY MEDICINE

## 2025-03-03 PROCEDURE — 99285 EMERGENCY DEPT VISIT HI MDM: CPT

## 2025-03-03 PROCEDURE — 73030 X-RAY EXAM OF SHOULDER: CPT

## 2025-03-03 PROCEDURE — 25810000003 SODIUM CHLORIDE 0.9 % SOLUTION: Performed by: EMERGENCY MEDICINE

## 2025-03-03 PROCEDURE — 84145 PROCALCITONIN (PCT): CPT | Performed by: EMERGENCY MEDICINE

## 2025-03-03 PROCEDURE — 87641 MR-STAPH DNA AMP PROBE: CPT | Performed by: INTERNAL MEDICINE

## 2025-03-03 PROCEDURE — 82948 REAGENT STRIP/BLOOD GLUCOSE: CPT

## 2025-03-03 PROCEDURE — 82550 ASSAY OF CK (CPK): CPT | Performed by: STUDENT IN AN ORGANIZED HEALTH CARE EDUCATION/TRAINING PROGRAM

## 2025-03-03 PROCEDURE — 25010000002 PIPERACILLIN SOD-TAZOBACTAM PER 1 G: Performed by: INTERNAL MEDICINE

## 2025-03-03 PROCEDURE — 87040 BLOOD CULTURE FOR BACTERIA: CPT | Performed by: EMERGENCY MEDICINE

## 2025-03-03 PROCEDURE — 93005 ELECTROCARDIOGRAM TRACING: CPT | Performed by: INTERNAL MEDICINE

## 2025-03-03 PROCEDURE — 85730 THROMBOPLASTIN TIME PARTIAL: CPT | Performed by: EMERGENCY MEDICINE

## 2025-03-03 PROCEDURE — 76775 US EXAM ABDO BACK WALL LIM: CPT

## 2025-03-03 RX ORDER — NALOXONE HCL 0.4 MG/ML
0.4 VIAL (ML) INJECTION
Status: DISCONTINUED | OUTPATIENT
Start: 2025-03-03 | End: 2025-03-08

## 2025-03-03 RX ORDER — NITROGLYCERIN 0.4 MG/1
0.4 TABLET SUBLINGUAL
Status: DISCONTINUED | OUTPATIENT
Start: 2025-03-03 | End: 2025-03-19 | Stop reason: HOSPADM

## 2025-03-03 RX ORDER — ACETAMINOPHEN 650 MG/1
650 SUPPOSITORY RECTAL EVERY 4 HOURS PRN
Status: DISCONTINUED | OUTPATIENT
Start: 2025-03-03 | End: 2025-03-11

## 2025-03-03 RX ORDER — METOPROLOL SUCCINATE 25 MG/1
25 TABLET, EXTENDED RELEASE ORAL DAILY
Status: DISCONTINUED | OUTPATIENT
Start: 2025-03-03 | End: 2025-03-19 | Stop reason: HOSPADM

## 2025-03-03 RX ORDER — BISACODYL 5 MG/1
5 TABLET, DELAYED RELEASE ORAL DAILY PRN
Status: DISCONTINUED | OUTPATIENT
Start: 2025-03-03 | End: 2025-03-19 | Stop reason: HOSPADM

## 2025-03-03 RX ORDER — HYDROCODONE BITARTRATE AND ACETAMINOPHEN 5; 325 MG/1; MG/1
0.5 TABLET ORAL EVERY 6 HOURS PRN
Status: DISPENSED | OUTPATIENT
Start: 2025-03-03 | End: 2025-03-12

## 2025-03-03 RX ORDER — CLOPIDOGREL BISULFATE 75 MG/1
75 TABLET ORAL DAILY
Status: DISCONTINUED | OUTPATIENT
Start: 2025-03-03 | End: 2025-03-19 | Stop reason: HOSPADM

## 2025-03-03 RX ORDER — SODIUM CHLORIDE 0.9 % (FLUSH) 0.9 %
3-10 SYRINGE (ML) INJECTION AS NEEDED
Status: DISCONTINUED | OUTPATIENT
Start: 2025-03-03 | End: 2025-03-19 | Stop reason: HOSPADM

## 2025-03-03 RX ORDER — ONDANSETRON 4 MG/1
4 TABLET, ORALLY DISINTEGRATING ORAL EVERY 6 HOURS PRN
Status: DISCONTINUED | OUTPATIENT
Start: 2025-03-03 | End: 2025-03-19 | Stop reason: HOSPADM

## 2025-03-03 RX ORDER — NICOTINE POLACRILEX 4 MG
15 LOZENGE BUCCAL
Status: DISCONTINUED | OUTPATIENT
Start: 2025-03-03 | End: 2025-03-19 | Stop reason: HOSPADM

## 2025-03-03 RX ORDER — FAMOTIDINE 20 MG/1
10 TABLET, FILM COATED ORAL 2 TIMES DAILY PRN
Status: DISCONTINUED | OUTPATIENT
Start: 2025-03-03 | End: 2025-03-19 | Stop reason: HOSPADM

## 2025-03-03 RX ORDER — ACETAMINOPHEN 160 MG/5ML
650 SOLUTION ORAL EVERY 4 HOURS PRN
Status: DISCONTINUED | OUTPATIENT
Start: 2025-03-03 | End: 2025-03-11

## 2025-03-03 RX ORDER — SODIUM CHLORIDE 0.9 % (FLUSH) 0.9 %
3 SYRINGE (ML) INJECTION EVERY 12 HOURS SCHEDULED
Status: DISCONTINUED | OUTPATIENT
Start: 2025-03-03 | End: 2025-03-19 | Stop reason: HOSPADM

## 2025-03-03 RX ORDER — AMIODARONE HYDROCHLORIDE 200 MG/1
200 TABLET ORAL DAILY
Status: DISCONTINUED | OUTPATIENT
Start: 2025-03-04 | End: 2025-03-19 | Stop reason: HOSPADM

## 2025-03-03 RX ORDER — DEXTROSE MONOHYDRATE 25 G/50ML
25 INJECTION, SOLUTION INTRAVENOUS
Status: DISCONTINUED | OUTPATIENT
Start: 2025-03-03 | End: 2025-03-19 | Stop reason: HOSPADM

## 2025-03-03 RX ORDER — ASCORBIC ACID 500 MG
250 TABLET ORAL DAILY
Status: DISCONTINUED | OUTPATIENT
Start: 2025-03-03 | End: 2025-03-19 | Stop reason: HOSPADM

## 2025-03-03 RX ORDER — CHOLECALCIFEROL (VITAMIN D3) 25 MCG
1000 TABLET ORAL DAILY
Status: DISCONTINUED | OUTPATIENT
Start: 2025-03-03 | End: 2025-03-19 | Stop reason: HOSPADM

## 2025-03-03 RX ORDER — POLYETHYLENE GLYCOL 3350 17 G/17G
17 POWDER, FOR SOLUTION ORAL DAILY PRN
Status: DISCONTINUED | OUTPATIENT
Start: 2025-03-03 | End: 2025-03-19 | Stop reason: HOSPADM

## 2025-03-03 RX ORDER — ONDANSETRON 2 MG/ML
4 INJECTION INTRAMUSCULAR; INTRAVENOUS EVERY 6 HOURS PRN
Status: DISCONTINUED | OUTPATIENT
Start: 2025-03-03 | End: 2025-03-19 | Stop reason: HOSPADM

## 2025-03-03 RX ORDER — AMOXICILLIN 250 MG
2 CAPSULE ORAL 2 TIMES DAILY PRN
Status: DISCONTINUED | OUTPATIENT
Start: 2025-03-03 | End: 2025-03-19 | Stop reason: HOSPADM

## 2025-03-03 RX ORDER — INSULIN LISPRO 100 [IU]/ML
2-9 INJECTION, SOLUTION INTRAVENOUS; SUBCUTANEOUS
Status: DISCONTINUED | OUTPATIENT
Start: 2025-03-03 | End: 2025-03-19 | Stop reason: HOSPADM

## 2025-03-03 RX ORDER — MORPHINE SULFATE 2 MG/ML
1 INJECTION, SOLUTION INTRAMUSCULAR; INTRAVENOUS EVERY 4 HOURS PRN
Status: DISCONTINUED | OUTPATIENT
Start: 2025-03-03 | End: 2025-03-08

## 2025-03-03 RX ORDER — SODIUM CHLORIDE 9 MG/ML
40 INJECTION, SOLUTION INTRAVENOUS AS NEEDED
Status: DISCONTINUED | OUTPATIENT
Start: 2025-03-03 | End: 2025-03-19 | Stop reason: HOSPADM

## 2025-03-03 RX ORDER — ATORVASTATIN CALCIUM 20 MG/1
20 TABLET, FILM COATED ORAL NIGHTLY
Status: DISCONTINUED | OUTPATIENT
Start: 2025-03-03 | End: 2025-03-19 | Stop reason: HOSPADM

## 2025-03-03 RX ORDER — IBUPROFEN 600 MG/1
1 TABLET ORAL
Status: DISCONTINUED | OUTPATIENT
Start: 2025-03-03 | End: 2025-03-19 | Stop reason: HOSPADM

## 2025-03-03 RX ORDER — BISACODYL 10 MG
10 SUPPOSITORY, RECTAL RECTAL DAILY PRN
Status: DISCONTINUED | OUTPATIENT
Start: 2025-03-03 | End: 2025-03-19 | Stop reason: HOSPADM

## 2025-03-03 RX ORDER — ACETAMINOPHEN 325 MG/1
650 TABLET ORAL EVERY 4 HOURS PRN
Status: DISCONTINUED | OUTPATIENT
Start: 2025-03-03 | End: 2025-03-11

## 2025-03-03 RX ORDER — HYDRALAZINE HYDROCHLORIDE 25 MG/1
25 TABLET, FILM COATED ORAL 3 TIMES DAILY
Status: DISCONTINUED | OUTPATIENT
Start: 2025-03-03 | End: 2025-03-06

## 2025-03-03 RX ADMIN — Medication 3 ML: at 22:19

## 2025-03-03 RX ADMIN — SODIUM BICARBONATE: 84 INJECTION, SOLUTION INTRAVENOUS at 23:33

## 2025-03-03 RX ADMIN — ATORVASTATIN CALCIUM 20 MG: 20 TABLET, FILM COATED ORAL at 22:15

## 2025-03-03 RX ADMIN — SODIUM CHLORIDE 1000 ML: 9 INJECTION, SOLUTION INTRAVENOUS at 15:30

## 2025-03-03 RX ADMIN — INSULIN LISPRO 4 UNITS: 100 INJECTION, SOLUTION INTRAVENOUS; SUBCUTANEOUS at 22:18

## 2025-03-03 RX ADMIN — OXYCODONE HYDROCHLORIDE AND ACETAMINOPHEN 250 MG: 500 TABLET ORAL at 22:15

## 2025-03-03 RX ADMIN — SODIUM CHLORIDE 2500 MG: 9 INJECTION, SOLUTION INTRAVENOUS at 18:54

## 2025-03-03 RX ADMIN — Medication 2.5 MG: at 22:19

## 2025-03-03 RX ADMIN — METOPROLOL SUCCINATE 25 MG: 25 TABLET, EXTENDED RELEASE ORAL at 22:15

## 2025-03-03 RX ADMIN — PIPERACILLIN AND TAZOBACTAM 4.5 G: 4; .5 INJECTION, POWDER, FOR SOLUTION INTRAVENOUS at 18:07

## 2025-03-03 RX ADMIN — APIXABAN 2.5 MG: 2.5 TABLET, FILM COATED ORAL at 22:15

## 2025-03-03 RX ADMIN — PIPERACILLIN AND TAZOBACTAM 3.38 G: 3; .375 INJECTION, POWDER, FOR SOLUTION INTRAVENOUS at 23:59

## 2025-03-03 NOTE — LETTER
EMS Transport Request  For use at [facility], [place], [place], [place], and [place] only   Patient Name: Rock Maciel Jr. : 1945   Weight:119 kg (262 lb 5.6 oz) Pick-up Location: [facility] BLS/ALS: BLS/ALS: BLS   Insurance: AN[employer] MEDICARE REPLACEMENT Auth End Date:    Pre-Cert #: D/C Summary complete:    Destination: Other [facility]    Contact Precautions: Other Contact-MRSA    Equipment (O2, Fluids, etc.): O2, settings 2LNC    Arrive By Date/Time: 2025 1800 (needs HD treatment prior and per RN they will start between 12:30-1pm) Stretcher/WC: Stretcher   CM Requesting: Pauly Martinez RN Ext: [phone]   Notes/Medical Necessity: 2L oxy, knee immobilizer to left leg, right foot debridement, unable to stand. Weakness.      ______________________________________________________________________    *Only 2 patient bags OR 1 carry-on size bag are permitted.  Wheelchairs and walkers CANNOT transported with the patient. Acknowledge: Yes

## 2025-03-03 NOTE — PROGRESS NOTES
Per chart review, patient has been followed by [facility], will defer consult to them. Thank you for consideration.

## 2025-03-03 NOTE — PLAN OF CARE
Consult received for KILLIAN on CKD III in patient admitted following fall.    Assessment:  KILLIAN: likely ATN related to rhabdomyolysis, prerenal insult related to diuretics etc.  Mild met acidosis  Rhabdomyolysis related to fall  CKD stage III  Elevated INR    Recommendations:  -agree with IVF bolus  -start buffered IVFs at 100 mL/h  -bladder scan   -check complete KILLIAN w/u.  -trend daily CK levels.  -follow up CXR  -hold diuretics, watch volume status.   -no NSAIDs    Plan d/w ER provider.  Complete consult to follow.  Call with questions.    Tiesha Mccrary MD  [facility]

## 2025-03-03 NOTE — ED PROVIDER NOTES
EMERGENCY DEPARTMENT ENCOUNTER  Room Number:  38/38  Date of encounter:  3/3/2025  PCP: Denise Dickson APRN  Patient Care Team:  Denise Dickson APRN as PCP - General (Nurse Practitioner)  Azam Banegas Jr., MD as Consulting Physician (Cardiology)  Jamil Stevens MD as Consulting Physician (Nephrology)     HPI:  Context: Rock Maciel Jr. is a 79 y.o. male who presents to the ED c/o chief complaint of fall with closed head injury and left shoulder pain.  Patient reports that he was using his chairlift to climb the stairs last night, his pants got caught, caused him to fall out of the chairlift.  Patient did strike his head, denies any loss consciousness, no headache, no confusion, no visual disturbances, no nausea or vomiting.  Patient did injure his left shoulder, complains of dull achy anterior shoulder pain.  Patient denies any other injury occurred in the fall.  Patient reports that he was unable to pull himself up secondary to his shoulder pain.  Patient reports that he was on the ground until someone called today, he was able to get to the phone and then they came over and brought him to the hospital.  Patient reports that prior to the fall he was at baseline health.  Patient denies any recent fever shakes chills or night sweats, no cough or upper respiratory symptoms, no vomiting or diarrhea, no urinary symptoms.  Patient is anticoagulated with Eliquis.  Patient reports tetanus is up-to-date.    MEDICAL HISTORY REVIEW  Reviewed in EPIC    PAST MEDICAL HISTORY  Active Ambulatory Problems     Diagnosis Date Noted    Abnormal ECG 05/17/2016    Arteriosclerosis of coronary artery 05/17/2016    Cardiac murmur 05/17/2016    Essential hypertension 05/17/2016    LAFB (left anterior fascicular block) 05/17/2016    Bundle branch block, right 05/17/2016    Neuropathic arthropathy 05/17/2016    Type 2 diabetes mellitus with circulatory disorder, without long-term current use of insulin 05/17/2016    SHASTA  (obstructive sleep apnea) 05/17/2016    Gain of weight 05/17/2016    Gout 10/27/2012    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 10/27/2012    Chronic venous insufficiency 10/27/2012    Nonrheumatic aortic valve stenosis 01/30/2017    Carotid stenosis, asymptomatic 01/30/2017    Bradycardia 05/08/2018    Hyperlipidemia 05/08/2018    Bilateral carotid artery disease 05/08/2018    Abnormal cardiovascular stress test 06/26/2018    H/O aortic valve replacement with porcine valve 09/10/2018    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    Amputated toe of right foot 04/11/2019    Fall 05/06/2019    Non-traumatic rhabdomyolysis 05/06/2019    S/P CABG x 2 05/06/2019    CKD (chronic kidney disease) stage 3, GFR 30-59 ml/min 05/06/2019    Rupture of left quadriceps tendon 05/07/2019    Constipation 05/11/2019    Acute kidney injury 03/26/2020    Wound of right leg 03/26/2020    Anemia 03/26/2020    Chronic diastolic (congestive) heart failure 03/30/2020    Amputated toe of left foot 02/12/2021    Gas gangrene 10/25/2021    Cellulitis of left lower limb 02/19/2022    Acquired absence of left foot 02/19/2022    Bilateral lower extremity edema 06/04/2023    Stage 3b chronic kidney disease 09/22/2023    History of pneumonia 03/10/2024    Atelectasis of both lungs 05/19/2024    Abnormal pleural fluid 05/19/2024    Pleural thickening 05/19/2024    Atrial fibrillation, persistent 10/18/2024    Chronic anticoagulation 10/18/2024    Persistent atrial fibrillation 11/07/2024    Bladder wall thickening 12/01/2024    Hematuria 12/01/2024    CKD (chronic kidney disease) stage 4, GFR 15-29 ml/min 12/01/2024    Hypoxemia 12/01/2024    Nocturnal hypoxemia 12/01/2024    Status post left inguinal hernia repair, follow-up exam 01/05/2025    Weakness of both lower extremities 01/05/2025    Unsteady gait when walking 01/05/2025    Wound, open, arm, forearm, right, subsequent encounter 01/06/2025     Resolved Ambulatory  Problems     Diagnosis Date Noted    Aortic heart valve narrowing 05/17/2016    Accumulation of fluid in tissues 05/17/2016    Alimentary obesity 05/17/2016    Pleural effusion, right 10/05/2016    Decubitus ulcer of ankle 11/15/2012    Class 1 obesity due to excess calories without serious comorbidity with body mass index (BMI) of 30.0 to 30.9 in adult 10/27/2012    Ulcer of ankle 11/15/2012    Pleural effusion 05/08/2017    Constipation 03/30/2020    Body mass index (BMI)40.0-44.9, adult 10/20/2020    Incarcerated inguinal hernia 11/26/2024     Past Medical History:   Diagnosis Date    Allergic rhinitis     Bronchitis     Coronary artery disease     Diabetes mellitus 2001    DM (diabetes mellitus)     Encounter for annual health examination 05/06/2014    Hiatal hernia     History of MRSA infection     Hypertension     Murmur     Pneumothorax on right     Sleep apnea     Wellness examination 06/24/2015       PAST SURGICAL HISTORY  Past Surgical History:   Procedure Laterality Date    ARTERY SURGERY Bilateral     carotid    CARDIAC CATHETERIZATION N/A 7/20/2018    Procedure: Left Heart Cath;  Surgeon: Jo Toney MD;  Location: [facility] CATH INVASIVE LOCATION;  Service: Cardiovascular    CARDIAC CATHETERIZATION N/A 7/20/2018    Procedure: Coronary angiography;  Surgeon: Jo Toney MD;  Location: [facility] CATH INVASIVE LOCATION;  Service: Cardiovascular    CAROTID ENDARTERECTOMY Bilateral     COLONOSCOPY  2008    CORONARY ARTERY BYPASS GRAFT WITH AORTIC VALVE REPAIR/REPLACEMENT N/A 7/23/2018    Procedure: INTRAOPERATIVE ALEX, MIDLINE STERNOTOMY, CORONARY ARTERY BYPASS GRAFTING X 3 USING ENDOSCOPICALLY HARVESTED LEFT GREATER SAPHENOUS VEIN,  AORTIC VALVE REPLACEMENT USING 25MM LOPEZ II ULTRA PORCINE VALVE, PRP;  Surgeon: Phong Posey MD;  Location: [facility] OR;  Service: Cardiothoracic    FOOT SURGERY Right 2010    5th digit removal    FOOT SURGERY Left 2011    1 digit removed    INCISION  AND DRAINAGE LEG Right 3/28/2020    Procedure: DEBRIDMENT OF RIGHT CALF;  Surgeon: Ross Pineda MD;  [facility];  Service: Vascular;  Laterality: Right;    INGUINAL HERNIA REPAIR Left 11/29/2024    Procedure: INGUINAL HERNIA REPAIR LAPAROSCOPIC WITH DAVINCI ROBOT;  Surgeon: Phong Lazo MD;  Location: [facility] MAIN OR;  Service: Robotics - DaVinci;  Laterality: Left;    QUADRICEPS TENDON REPAIR Left 5/14/2019    Procedure: Left QUADRICEPS TENDON REPAIR;  Surgeon: Camilo Hunter MD;  [facility];  Service: Orthopedics    THORACENTESIS Right 11/21/2016    THORACOSCOPY Right 5/8/2017    Procedure: BRONCHOSCOPY, RIGHT VAT,  TOTAL DECORTICATION RIGHT LUNG, PLEURAL BX, PLACEMENT SUBPLEURAL PAIN CAATHETERS X2;  Surgeon: Donald Orlando III, MD;  Location: [facility] MAIN OR;  Service:     TONSILECTOMY, ADENOIDECTOMY, BILATERAL MYRINGOTOMY AND TUBES         FAMILY HISTORY  Family History   Problem Relation Age of Onset    Hypertension Father     Malig Hyperthermia Neg Hx        SOCIAL HISTORY  Social History     Socioeconomic History    Marital status:     Number of children: 1   Tobacco Use    Smoking status: Never     Passive exposure: Never    Smokeless tobacco: Never   Vaping Use    Vaping status: Never Used   Substance and Sexual Activity    Alcohol use: Yes     Comment: either one glass wine or one glass liquor per  day    Drug use: Never    Sexual activity: Defer       ALLERGIES  Ceclor [cefaclor]    The patient's allergies have been reviewed    REVIEW OF SYSTEMS  All systems reviewed and negative except for those discussed in HPI.     PHYSICAL EXAM  I have reviewed the triage vital signs and nursing notes.  ED Triage Vitals   Temp Heart Rate Resp BP SpO2   03/03/25 1353 03/03/25 1353 03/03/25 1353 03/03/25 1401 03/03/25 1353   97 °F (36.1 °C) 119 22 (!) 125/105 95 %      Temp src Heart Rate Source Patient Position BP Location FiO2 (%)   -- -- -- -- --               General: No acute distress.  HENT: Multiple abrasions on occiput, no crepitus or deformity, PERRL, Nares patent.  Eyes: no scleral icterus.  Neck: trachea midline, no ROM limitations.  CV: regular rhythm, regular rate.  Respiratory: normal effort, CTAB.  Abdomen: soft, nondistended, NTTP, no rebound tenderness, no guarding or rigidity.  Musculoskeletal: no deformity.  Left shoulder: No crepitus or deformity, anterior tenderness to palpation.  Neuro: alert, moves all extremities, follows commands.  Skin: warm, dry.    LAB RESULTS  Recent Results (from the past 24 hours)   Comprehensive Metabolic Panel    Collection Time: 03/03/25  2:08 PM    Specimen: Blood   Result Value Ref Range    Glucose 197 (H) 65 - 99 mg/dL    BUN 64 (H) 8 - 23 mg/dL    Creatinine 4.22 (H) 0.76 - 1.27 mg/dL    Sodium 136 136 - 145 mmol/L    Potassium 4.9 3.5 - 5.2 mmol/L    Chloride 100 98 - 107 mmol/L    CO2 16.0 (L) 22.0 - 29.0 mmol/L    Calcium 8.8 8.6 - 10.5 mg/dL    Total Protein 7.2 6.0 - 8.5 g/dL    Albumin 3.4 (L) 3.5 - 5.2 g/dL    ALT (SGPT) 29 1 - 41 U/L    AST (SGOT) 97 (H) 1 - 40 U/L    Alkaline Phosphatase 126 (H) 39 - 117 U/L    Total Bilirubin 0.7 0.0 - 1.2 mg/dL    Globulin 3.8 gm/dL    A/G Ratio 0.9 g/dL    BUN/Creatinine Ratio 15.2 7.0 - 25.0    Anion Gap 20.0 (H) 5.0 - 15.0 mmol/L    eGFR 13.5 (L) >60.0 mL/min/1.73   CK    Collection Time: 03/03/25  2:08 PM    Specimen: Blood   Result Value Ref Range    Creatine Kinase 5,033 (H) 20 - 200 U/L   CBC Auto Differential    Collection Time: 03/03/25  2:08 PM    Specimen: Blood   Result Value Ref Range    WBC 24.99 (H) 3.40 - 10.80 10*3/mm3    RBC 3.21 (L) 4.14 - 5.80 10*6/mm3    Hemoglobin 9.5 (L) 13.0 - 17.7 g/dL    Hematocrit 29.4 (L) 37.5 - 51.0 %    MCV 91.6 79.0 - 97.0 fL    MCH 29.6 26.6 - 33.0 pg    MCHC 32.3 31.5 - 35.7 g/dL    RDW 14.2 12.3 - 15.4 %    RDW-SD 47.5 37.0 - 54.0 fl    MPV 10.7 6.0 - 12.0 fL    Platelets 253 140 - 450 10*3/mm3    nRBC 0.0 0.0 - 0.2 /100  WBC   Green Top (Gel)    Collection Time: 03/03/25  2:08 PM   Result Value Ref Range    Extra Tube Hold for add-ons.    Lavender Top    Collection Time: 03/03/25  2:08 PM   Result Value Ref Range    Extra Tube hold for add-on    Light Blue Top    Collection Time: 03/03/25  2:08 PM   Result Value Ref Range    Extra Tube Hold for add-ons.    Protime-INR    Collection Time: 03/03/25  2:08 PM    Specimen: Blood   Result Value Ref Range    Protime 22.8 (H) 11.7 - 14.2 Seconds    INR 2.00 (H) 0.90 - 1.10   aPTT    Collection Time: 03/03/25  2:08 PM    Specimen: Blood   Result Value Ref Range    PTT 33.0 22.7 - 35.4 seconds   Manual Differential    Collection Time: 03/03/25  2:08 PM    Specimen: Blood   Result Value Ref Range    Neutrophil % 89.8 (H) 42.7 - 76.0 %    Lymphocyte % 1.0 (L) 19.6 - 45.3 %    Monocyte % 6.1 5.0 - 12.0 %    Metamyelocyte % 2.0 (H) 0.0 - 0.0 %    Myelocyte % 1.0 (H) 0.0 - 0.0 %    Neutrophils Absolute 22.44 (H) 1.70 - 7.00 10*3/mm3    Lymphocytes Absolute 0.25 (L) 0.70 - 3.10 10*3/mm3    Monocytes Absolute 1.52 (H) 0.10 - 0.90 10*3/mm3    Elliptocytes Slight/1+ None Seen    Ovalocytes Slight/1+ None Seen    Poikilocytes Slight/1+ None Seen    WBC Morphology Normal Normal    Platelet Morphology Normal Normal   Procalcitonin    Collection Time: 03/03/25  2:08 PM    Specimen: Blood   Result Value Ref Range    Procalcitonin 4.80 (H) 0.00 - 0.25 ng/mL       I ordered the above labs and reviewed the results.    RADIOLOGY  US Renal Bilateral    Result Date: 3/3/2025  US RENAL BILATERAL-3/3/2025  HISTORY: Renal insufficiency.  Right kidney measures 8.7 cm in length. Left kidney measures 10.1 cm in length. Urinary bladder is well distended. There appears to be bladder diverticulum arising posteriorly.  No solid renal masses, stones or hydronephrosis is seen.      1. Unremarkable bilateral renal ultrasound. 2. Urinary bladder diverticulum.   This report was finalized on 3/3/2025 5:05 PM by Dr. Zayas  AJ Olmstead on Workstation: [id number]      XR Chest 1 View    Result Date: 3/3/2025  XR CHEST 1 VW-3/3/2025  HISTORY: Leukocytosis.  Heart size is mildly enlarged. There is some mild atelectasis or pneumonia in the right lung base. Calcified plaques are seen in the left hemithorax. Sternotomy wires are seen.      1. Mild cardiomegaly. 2. Mild patchy atelectasis or pneumonia in the right lung base.   This report was finalized on 3/3/2025 4:55 PM by Dr. Marquez Olmstead M.D on Workstation: [id number]      CT Head Without Contrast    Result Date: 3/3/2025  CT HEAD WO CONTRAST-  INDICATIONS: Head injury  TECHNIQUE: Radiation dose reduction techniques were utilized, including automated exposure control and exposure modulation based on body size. Noncontrast head CT  COMPARISON: 1/23/2018  FINDINGS:    No acute intracranial hemorrhage, midline shift or mass effect. No acute territorial infarct is identified.   Mild periventricular hypodensities suggest chronic small vessel ischemic change in a patient this age.  Arterial calcifications are seen at the base of the brain.  Ventricles, cisterns, cerebral sulci are unremarkable for patient age.  Right mastoid air cells are opacified, and right middle ear is partly opacified. Small paranasal sinus mucosal thickening is present. The visualized paranasal sinuses, orbits, mastoid air cells are otherwise unremarkable.        No acute intracranial hemorrhage or hydrocephalus. If there is further clinical concern, MRI could be considered for further evaluation.  This report was finalized on 3/3/2025 4:00 PM by Dr. Azam Alaniz M.D on Workstation: [id number]      XR Shoulder 2+ View Left, XR Humerus Left    Result Date: 3/3/2025  XR SHOULDER 2+ VW LEFT-, XR HUMERUS LEFT-  HISTORY: 79-year-old male status post shoulder pain following a fall.  FINDINGS: There is a comminuted fracture of the distal radius with 1.5 cm caudal displacement of the clavicular shaft. There are  extensive osteoarthritic changes at the AC joint, but there is no AC joint separation or dislocation. There is significant narrowing of the subacromial space. There is no dislocation and the left humerus appears unremarkable.       I ordered the above noted radiological studies. I reviewed the images and results. I agree with the radiologist interpretation.    PROCEDURES  Procedures    MEDICATIONS GIVEN IN ER  Medications   sodium chloride 0.45 % 925 mL with sodium bicarbonate 8.4 % 75 mEq infusion (has no administration in time range)   vancomycin 2500 mg/500 mL 0.9% NS IVPB ([facility]) (has no administration in time range)   piperacillin-tazobactam (ZOSYN) 4.5 g IVPB in 100 mL NS MBP ([facility]) (has no administration in time range)   sodium chloride 0.9 % bolus 1,000 mL (1,000 mL Intravenous New Bag 3/3/25 1530)       PROGRESS, DATA ANALYSIS, CONSULTS, AND MEDICAL DECISION MAKING  A complete history and physical exam have been performed.  All available laboratory and imaging results have been reviewed by myself prior to disposition.    MDM    After the initial H&P, I discussed pertinent information from history and physical exam with patient/family.  Discussed differential diagnosis.  Discussed plan for ED evaluation/workup/treatment.  All questions answered.  Patient/family is agreeable with plan.  ED Course as of 03/03/25 1726   Mon Mar 03, 2025   1515 Patient with significant acute on chronic kidney injury with creatinine at 4.22 with last prior from 3 months ago 1.95.  BUN elevated 64, potassium normal.  Giving IV fluids, plan for admission. [JG]   1515 Traumatic rhabdomyolysis, receiving IV fluids.  Consulting nephrology, consulting hospitalist for admission. [JG]   1516 Patient with significant leukocytosis, this could be secondary to a traumatic rhabdomyolysis but will obtain infectious workup including chest x-ray urinalysis blood cultures lactic acid and procalcitonin. [JG]   1517 Review of prior external notes  (non-ED) -and- Review of prior external test results outside of this encounter:   I reviewed patient's echocardiogram from October last year, EF of 61 to 65%. [JG]   1518 Patient reassessed, discussed ED workup and results are present, discussed finding of traumatic rhabdomyolysis as well as KILLIAN, plan for admission to hospital.  Patient has no questions or concerns at present. [JG]   1542 Phone call with nephrology on-call.  Discussed the patient, relevant history, exam, diagnostics, ED findings/progress, and concerns.  They agree to consult.    [JG]   1636 Reviewed CT head imaging in PACS, no intracranial hemorrhage per my read. [JG]   1647 Procalcitonin elevated at 4.80.  Empirically treating with broad-spectrum antibiotics, using vancomycin and cefepime given severity of KILLIAN. [JG]   1654 Patient has listed allergy to cephalosporin although it appears he recently received 1.  Consulting with pharmacy for recommendations. [JG]   1654 Phone call with pharmacist.  Discussed the patient, relevant history, exam, diagnostics, ED findings/progress, and concerns.  They recommend treatment with Zosyn. [JG]   1656 Patient reassessed.  Discussed ED findings, differential diagnosis, and the need for admission for evaluation/treatment.  They are agreeable to admission and all questions were answered.     [JG]   1723 Phone call with Dr. Chakraborty [facility].  Discussed the patient, relevant history, exam, diagnostics, ED findings/progress, and concerns. They agree to admit the patient to telemetry. Care assumed by the admitting physician at this time.     [JG]      ED Course User Index  [JG] Gunner Garcia MD       AS OF 17:26 EST VITALS:    BP - 123/61  HR - 104  TEMP - 97 °F (36.1 °C)  O2 SATS - 92%    DIAGNOSIS  Final diagnoses:   Traumatic rhabdomyolysis, initial encounter   Acute renal failure superimposed on chronic kidney disease, unspecified acute renal failure type, unspecified CKD stage   Sepsis, due to unspecified  organism, unspecified whether acute organ dysfunction present   Pneumonia due to infectious organism, unspecified laterality, unspecified part of lung   Hyperglycemia   Anemia, unspecified type   Closed displaced fracture of acromial end of left clavicle, initial encounter     Critical care:  Total critical care time of 85 minutes is exclusive of any other billable procedures and includes time spent with direct patient care and observation, retrospective chart review, management of acute condition, and consultation with other physicians.      DISPOSITION  ADMISSION    Discussed treatment plan and reason for admission with pt/family and admitting physician.  Pt/family voiced understanding of the plan for admission for further testing/treatment as needed.        Gunner Garcia MD  03/03/25 [id number]

## 2025-03-03 NOTE — ED NOTES
Nursing report ED to floor  Rock Maciel Jr.  79 y.o.  male    HPI :  HPI  Stated Reason for Visit: fall  History Obtained From: patient    Chief Complaint  Chief Complaint   Patient presents with    Fall       Admitting doctor:   Bishop Chakraborty MD    Admitting diagnosis:   The primary encounter diagnosis was Traumatic rhabdomyolysis, initial encounter. Diagnoses of Acute renal failure superimposed on chronic kidney disease, unspecified acute renal failure type, unspecified CKD stage, Sepsis, due to unspecified organism, unspecified whether acute organ dysfunction present, Pneumonia due to infectious organism, unspecified laterality, unspecified part of lung, Hyperglycemia, Anemia, unspecified type, and Closed displaced fracture of acromial end of left clavicle, initial encounter were also pertinent to this visit.    Code status:   Current Code Status       Date Active Code Status Order ID Comments User Context       3/3/2025 1739 CPR (Attempt to Resuscitate) [id number]  Bishop Chakraborty MD ED        Question Answer    Code Status (Patient has no pulse and is not breathing) CPR (Attempt to Resuscitate)    Medical Interventions (Patient has pulse or is breathing) Full Support    Level Of Support Discussed With Patient                    Allergies:   Ceclor [cefaclor]    Isolation:   No active isolations    Intake and Output  No intake or output data in the 24 hours ending 03/03/25 1752    Weight:       03/03/25  1601   Weight: 122 kg (268 lb 15.4 oz)       Most recent vitals:   Vitals:    03/03/25 1431 03/03/25 1501 03/03/25 1531 03/03/25 1601   BP: 111/69 120/47 118/60 123/61   Pulse: 93 88 111 104   Resp:       Temp:       SpO2: 91% 90% 92% 92%   Weight:    122 kg (268 lb 15.4 oz)       Active LDAs/IV Access:   Lines, Drains & Airways       Active LDAs       Name Placement date Placement time Site Days    Peripheral IV 03/03/25 1530 Left Antecubital 03/03/25  1530  Antecubital  less than 1                     Labs (abnormal labs have a star):   Labs Reviewed   COMPREHENSIVE METABOLIC PANEL - Abnormal; Notable for the following components:       Result Value    Glucose 197 (*)     BUN 64 (*)     Creatinine 4.22 (*)     CO2 16.0 (*)     Albumin 3.4 (*)     AST (SGOT) 97 (*)     Alkaline Phosphatase 126 (*)     Anion Gap 20.0 (*)     eGFR 13.5 (*)     All other components within normal limits    Narrative:     GFR Categories in Chronic Kidney Disease (CKD)      GFR Category          GFR (mL/min/1.73)    Interpretation  G1                     90 or greater         Normal or high (1)  G2                      60-89                Mild decrease (1)  G3a                   45-59                Mild to moderate decrease  G3b                   30-44                Moderate to severe decrease  G4                    15-29                Severe decrease  G5                    14 or less           Kidney failure          (1)In the absence of evidence of kidney disease, neither GFR category G1 or G2 fulfill the criteria for CKD.    eGFR calculation 2021 CKD-EPI creatinine equation, which does not include race as a factor   CK - Abnormal; Notable for the following components:    Creatine Kinase 5,033 (*)     All other components within normal limits   CBC WITH AUTO DIFFERENTIAL - Abnormal; Notable for the following components:    WBC 24.99 (*)     RBC 3.21 (*)     Hemoglobin 9.5 (*)     Hematocrit 29.4 (*)     All other components within normal limits   PROTIME-INR - Abnormal; Notable for the following components:    Protime 22.8 (*)     INR 2.00 (*)     All other components within normal limits   MANUAL DIFFERENTIAL - Abnormal; Notable for the following components:    Neutrophil % 89.8 (*)     Lymphocyte % 1.0 (*)     Metamyelocyte % 2.0 (*)     Myelocyte % 1.0 (*)     Neutrophils Absolute 22.44 (*)     Lymphocytes Absolute 0.25 (*)     Monocytes Absolute 1.52 (*)     All other components within normal limits  "  PROCALCITONIN - Abnormal; Notable for the following components:    Procalcitonin 4.80 (*)     All other components within normal limits    Narrative:     As a Marker for Sepsis (Non-Neonates):    1. <0.5 ng/mL represents a low risk of severe sepsis and/or septic shock.  2. >2 ng/mL represents a high risk of severe sepsis and/or septic shock.    As a Marker for Lower Respiratory Tract Infections that require antibiotic therapy:    PCT on Admission    Antibiotic Therapy       6-12 Hrs later    >0.5                Strongly Recommended  >0.25 - <0.5        Recommended   0.1 - 0.25          Discouraged              Remeasure/reassess PCT  <0.1                Strongly Discouraged     Remeasure/reassess PCT    As 28 day mortality risk marker: \"Change in Procalcitonin Result\" (>80% or <=80%) if Day 0 (or Day 1) and Day 4 values are available. Refer to http://www."[employer]s-pct-calculator.com    Change in PCT <=80%  A decrease of PCT levels below or equal to 80% defines a positive change in PCT test result representing a higher risk for 28-day all-cause mortality of patients diagnosed with severe sepsis for septic shock.    Change in PCT >80%  A decrease of PCT levels of more than 80% defines a negative change in PCT result representing a lower risk for 28-day all-cause mortality of patients diagnosed with severe sepsis or septic shock.      APTT - Normal   BLOOD CULTURE   BLOOD CULTURE   MRSA SCREEN, PCR   URINALYSIS W/ MICROSCOPIC IF INDICATED (NO CULTURE)   RAINBOW DRAW    Narrative:     The following orders were created for panel order Price Draw.  Procedure                               Abnormality         Status                     ---------                               -----------         ------                     Green Top (Gel)[[id number]]                                  Final result               Lavender Top[[id number]]                                     Final result               Gold Top - SST[[id number]]       "                                                        Light Blue Top[[id number]]                                   Final result                 Please view results for these tests on the individual orders.   LACTIC ACID, PLASMA   PROTEIN / CREATININE RATIO, URINE   MICROALBUMIN / CREATININE URINE RATIO   CK   HEMOGLOBIN A1C   POCT GLUCOSE FINGERSTICK   POCT GLUCOSE FINGERSTICK   POCT GLUCOSE FINGERSTICK   POCT GLUCOSE FINGERSTICK   CBC AND DIFFERENTIAL    Narrative:     The following orders were created for panel order CBC & Differential.  Procedure                               Abnormality         Status                     ---------                               -----------         ------                     CBC Auto Differential[[id number]]        Abnormal            Final result                 Please view results for these tests on the individual orders.   GREEN TOP   LAVENDER TOP   LIGHT BLUE TOP   GOLD TOP - SST       EKG:   No orders to display       Meds given in ED:   Medications   sodium chloride 0.45 % 925 mL with sodium bicarbonate 8.4 % 75 mEq infusion (has no administration in time range)   vancomycin 2500 mg/500 mL 0.9% NS IVPB ([facility]) (has no administration in time range)   piperacillin-tazobactam (ZOSYN) 4.5 g IVPB in 100 mL NS MBP (CD) (has no administration in time range)   piperacillin-tazobactam (ZOSYN) 3.375 g IVPB in 100 mL NS MBP (CD) (has no administration in time range)   sodium chloride 0.9 % flush 3 mL (has no administration in time range)   sodium chloride 0.9 % flush 3-10 mL (has no administration in time range)   sodium chloride 0.9 % infusion 40 mL (has no administration in time range)   acetaminophen (TYLENOL) tablet 650 mg (has no administration in time range)     Or   acetaminophen (TYLENOL) 160 MG/5ML oral solution 650 mg (has no administration in time range)     Or   acetaminophen (TYLENOL) suppository 650 mg (has no administration in time range)   famotidine (PEPCID) tablet 10 mg  (has no administration in time range)   sennosides-docusate (PERICOLACE) 8.6-50 MG per tablet 2 tablet (has no administration in time range)     And   polyethylene glycol (MIRALAX) packet 17 g (has no administration in time range)     And   bisacodyl (DULCOLAX) EC tablet 5 mg (has no administration in time range)     And   bisacodyl (DULCOLAX) suppository 10 mg (has no administration in time range)   ondansetron ODT (ZOFRAN-ODT) disintegrating tablet 4 mg (has no administration in time range)     Or   ondansetron (ZOFRAN) injection 4 mg (has no administration in time range)   melatonin tablet 2.5 mg (has no administration in time range)   nitroglycerin (NITROSTAT) SL tablet 0.4 mg (has no administration in time range)   HYDROcodone-acetaminophen (NORCO) 5-325 MG per tablet 0.5 tablet (has no administration in time range)   morphine injection 1 mg (has no administration in time range)     And   naloxone (NARCAN) injection 0.4 mg (has no administration in time range)   amiodarone (PACERONE) tablet 200 mg (has no administration in time range)   apixaban (ELIQUIS) tablet 2.5 mg (has no administration in time range)   atorvastatin (LIPITOR) tablet 20 mg (has no administration in time range)   cholecalciferol (VITAMIN D3) tablet 1,000 Units (has no administration in time range)   clopidogrel (PLAVIX) tablet 75 mg ( Oral Dose Auto Held 3/11/25 0900)   hydrALAZINE (APRESOLINE) tablet 25 mg ( Oral Dose Auto Held 3/11/25 2100)   linagliptin (TRADJENTA) tablet 5 mg (has no administration in time range)   metoprolol succinate XL (TOPROL-XL) 24 hr tablet 25 mg (has no administration in time range)   ascorbic acid (VITAMIN C) tablet 250 mg (has no administration in time range)   dextrose (GLUTOSE) oral gel 15 g (has no administration in time range)   dextrose (D50W) (25 g/50 mL) IV injection 25 g (has no administration in time range)   glucagon (GLUCAGEN) injection 1 mg (has no administration in time range)   insulin lispro  (HUMALOG/ADMELOG) injection 2-9 Units (has no administration in time range)   sodium chloride 0.9 % bolus 1,000 mL (1,000 mL Intravenous New Bag 3/3/25 1530)       Imaging results:  US Renal Bilateral    Result Date: 3/3/2025  1. Unremarkable bilateral renal ultrasound. 2. Urinary bladder diverticulum.   This report was finalized on 3/3/2025 5:05 PM by Dr. Marquez Olmstead M.D on Workstation: [id number]      XR Chest 1 View    Result Date: 3/3/2025  1. Mild cardiomegaly. 2. Mild patchy atelectasis or pneumonia in the right lung base.   This report was finalized on 3/3/2025 4:55 PM by Dr. Marquez Olmstead M.D on Workstation: [id number]      CT Head Without Contrast    Result Date: 3/3/2025   No acute intracranial hemorrhage or hydrocephalus. If there is further clinical concern, MRI could be considered for further evaluation.  This report was finalized on 3/3/2025 4:00 PM by Dr. Azam Alaniz M.D on Workstation: [id number]       Ambulatory status:   - has not ambulated in ED    Social issues:   Social History     Socioeconomic History    Marital status:     Number of children: 1   Tobacco Use    Smoking status: Never     Passive exposure: Never    Smokeless tobacco: Never   Vaping Use    Vaping status: Never Used   Substance and Sexual Activity    Alcohol use: Yes     Comment: either one glass wine or one glass liquor per  day    Drug use: Never    Sexual activity: Defer       Peripheral Neurovascular  Peripheral Neurovascular (Adult)  Peripheral Neurovascular WDL: .WDL except  Additional Documentation: Edema (Group)  Edema  Edema: foot, right, foot, left, ankle, right, ankle, left, leg, left, leg, right (patient reports lower extremity edema at baseline)  Leg, Left Edema: 3+ (Moderate)  Leg, Right Edema: 3+ (Moderate)  Ankle, Left Edema: 3+ (Moderate)  Ankle, Right Edema: 3+ (Moderate)  Foot, Left Edema: 3+ (Moderate)  Foot, Right Edema: 3+ (Moderate)    Neuro Cognitive  Neuro Cognitive  (Adult)  Cognitive/Neuro/Behavioral WDL: WDL, orientation  Orientation: oriented x 4    Learning  Learning Assessment  Learning Readiness and Ability: no barriers identified  Education Provided  Person Taught: patient  Teaching Method: verbal instruction  Teaching Focus: symptom/problem overview  Education Outcome Evaluation: eager to learn, acceptance expressed, verbalizes understanding    Respiratory  Respiratory  Airway WDL: WDL  Respiratory WDL  Respiratory WDL: .WDL except, all  Rhythm/Pattern, Respiratory: no shortness of breath reported, shallow    Abdominal Pain       Pain Assessments  Pain (Adult)  (0-10) Pain Rating: Rest: 2  (0-10) Pain Rating: Activity: 6  Pain Location: shoulder  Pain Side/Orientation: left    NIH Stroke Scale       Georgie Sullivan RN  03/03/25 17:52 EST

## 2025-03-03 NOTE — ED TRIAGE NOTES
Pt reports a fall yesterday, was on the floor until this afternoon, left shoulder pain, dried blood to head/nose/lips

## 2025-03-04 LAB
ALBUMIN SERPL-MCNC: 2.6 G/DL (ref 3.5–5.2)
ALBUMIN/GLOB SERPL: 0.9 G/DL
ALP SERPL-CCNC: 94 U/L (ref 39–117)
ALT SERPL W P-5'-P-CCNC: 30 U/L (ref 1–41)
ANION GAP SERPL CALCULATED.3IONS-SCNC: 13 MMOL/L (ref 5–15)
AST SERPL-CCNC: 79 U/L (ref 1–40)
BILIRUB SERPL-MCNC: 0.5 MG/DL (ref 0–1.2)
BUN SERPL-MCNC: 69 MG/DL (ref 8–23)
BUN/CREAT SERPL: 17 (ref 7–25)
CALCIUM SPEC-SCNC: 7.9 MG/DL (ref 8.6–10.5)
CHLORIDE SERPL-SCNC: 106 MMOL/L (ref 98–107)
CK SERPL-CCNC: 3020 U/L (ref 20–200)
CO2 SERPL-SCNC: 21 MMOL/L (ref 22–29)
CREAT SERPL-MCNC: 4.06 MG/DL (ref 0.76–1.27)
DEPRECATED RDW RBC AUTO: 49.5 FL (ref 37–54)
EGFRCR SERPLBLD CKD-EPI 2021: 14.2 ML/MIN/1.73
ERYTHROCYTE [DISTWIDTH] IN BLOOD BY AUTOMATED COUNT: 15.3 % (ref 12.3–15.4)
GLOBULIN UR ELPH-MCNC: 3 GM/DL
GLUCOSE BLDC GLUCOMTR-MCNC: 155 MG/DL (ref 70–130)
GLUCOSE BLDC GLUCOMTR-MCNC: 162 MG/DL (ref 70–130)
GLUCOSE BLDC GLUCOMTR-MCNC: 171 MG/DL (ref 70–130)
GLUCOSE BLDC GLUCOMTR-MCNC: 233 MG/DL (ref 70–130)
GLUCOSE SERPL-MCNC: 128 MG/DL (ref 65–99)
HCT VFR BLD AUTO: 21 % (ref 37.5–51)
HGB BLD-MCNC: 7 G/DL (ref 13–17.7)
MCH RBC QN AUTO: 29.3 PG (ref 26.6–33)
MCHC RBC AUTO-ENTMCNC: 33.3 G/DL (ref 31.5–35.7)
MCV RBC AUTO: 87.9 FL (ref 79–97)
MRSA DNA SPEC QL NAA+PROBE: NORMAL
PLATELET # BLD AUTO: 185 10*3/MM3 (ref 140–450)
PMV BLD AUTO: 10.6 FL (ref 6–12)
POTASSIUM SERPL-SCNC: 4.2 MMOL/L (ref 3.5–5.2)
PROT SERPL-MCNC: 5.6 G/DL (ref 6–8.5)
RBC # BLD AUTO: 2.39 10*6/MM3 (ref 4.14–5.8)
SODIUM SERPL-SCNC: 140 MMOL/L (ref 136–145)
WBC NRBC COR # BLD AUTO: 14.09 10*3/MM3 (ref 3.4–10.8)

## 2025-03-04 PROCEDURE — 85027 COMPLETE CBC AUTOMATED: CPT | Performed by: INTERNAL MEDICINE

## 2025-03-04 PROCEDURE — 82948 REAGENT STRIP/BLOOD GLUCOSE: CPT

## 2025-03-04 PROCEDURE — 25010000002 PIPERACILLIN SOD-TAZOBACTAM PER 1 G: Performed by: INTERNAL MEDICINE

## 2025-03-04 PROCEDURE — 63710000001 INSULIN LISPRO (HUMAN) PER 5 UNITS: Performed by: INTERNAL MEDICINE

## 2025-03-04 PROCEDURE — 80053 COMPREHEN METABOLIC PANEL: CPT | Performed by: INTERNAL MEDICINE

## 2025-03-04 PROCEDURE — 82550 ASSAY OF CK (CPK): CPT | Performed by: STUDENT IN AN ORGANIZED HEALTH CARE EDUCATION/TRAINING PROGRAM

## 2025-03-04 RX ORDER — INSULIN LISPRO 100 [IU]/ML
2 INJECTION, SOLUTION INTRAVENOUS; SUBCUTANEOUS
Status: DISCONTINUED | OUTPATIENT
Start: 2025-03-04 | End: 2025-03-05

## 2025-03-04 RX ADMIN — Medication 1000 UNITS: at 08:52

## 2025-03-04 RX ADMIN — APIXABAN 2.5 MG: 2.5 TABLET, FILM COATED ORAL at 20:34

## 2025-03-04 RX ADMIN — INSULIN LISPRO 2 UNITS: 100 INJECTION, SOLUTION INTRAVENOUS; SUBCUTANEOUS at 16:49

## 2025-03-04 RX ADMIN — INSULIN LISPRO 4 UNITS: 100 INJECTION, SOLUTION INTRAVENOUS; SUBCUTANEOUS at 11:53

## 2025-03-04 RX ADMIN — Medication 3 ML: at 08:54

## 2025-03-04 RX ADMIN — ATORVASTATIN CALCIUM 20 MG: 20 TABLET, FILM COATED ORAL at 20:34

## 2025-03-04 RX ADMIN — PIPERACILLIN AND TAZOBACTAM 3.38 G: 3; .375 INJECTION, POWDER, FOR SOLUTION INTRAVENOUS at 08:52

## 2025-03-04 RX ADMIN — PIPERACILLIN AND TAZOBACTAM 3.38 G: 3; .375 INJECTION, POWDER, FOR SOLUTION INTRAVENOUS at 16:03

## 2025-03-04 RX ADMIN — OXYCODONE HYDROCHLORIDE AND ACETAMINOPHEN 250 MG: 500 TABLET ORAL at 08:52

## 2025-03-04 RX ADMIN — LINAGLIPTIN 5 MG: 5 TABLET, FILM COATED ORAL at 08:52

## 2025-03-04 RX ADMIN — INSULIN LISPRO 2 UNITS: 100 INJECTION, SOLUTION INTRAVENOUS; SUBCUTANEOUS at 08:54

## 2025-03-04 RX ADMIN — Medication 2.5 MG: at 20:34

## 2025-03-04 RX ADMIN — AMIODARONE HYDROCHLORIDE 200 MG: 200 TABLET ORAL at 08:54

## 2025-03-04 RX ADMIN — APIXABAN 2.5 MG: 2.5 TABLET, FILM COATED ORAL at 08:52

## 2025-03-04 RX ADMIN — Medication 3 ML: at 20:36

## 2025-03-04 RX ADMIN — METOPROLOL SUCCINATE 25 MG: 25 TABLET, EXTENDED RELEASE ORAL at 08:52

## 2025-03-04 RX ADMIN — SODIUM BICARBONATE: 84 INJECTION, SOLUTION INTRAVENOUS at 20:34

## 2025-03-04 RX ADMIN — PIPERACILLIN AND TAZOBACTAM 3.38 G: 3; .375 INJECTION, POWDER, FOR SOLUTION INTRAVENOUS at 23:47

## 2025-03-04 NOTE — PAYOR COMM NOTE
"Reece Macielest KELLY  (79 y.o. Male)     ATTN: NURSE REVIEWER  RE: INITIAL INPT [facility] CLINICALS   REF# [id number]  PLS REPLY TO HERIBERTO JONES [phone] OR FAX# [phone]      Date of Birth   1945    Social Security Number       Address   [address] [place] [address]    Home Phone   [phone]    MRN   [id number]       Amish   Rastafarian    Marital Status                               Admission Date   3/3/25    Admission Type   Emergency    Admitting Provider   Bishop Chakraborty MD    Attending Provider   Bishop Chakraborty MD    Department, Room/Bed   [address], S422/1       Discharge Date       Discharge Disposition       Discharge Destination                                 Attending Provider: Bishop Chakraborty MD    Allergies: Ceclor [Cefaclor]    Isolation: None   Infection: None   Code Status: CPR    Ht: 185.4 cm (73\")   Wt: 127 kg (279 lb 15.8 oz)    Admission Cmt: None   Principal Problem: Traumatic rhabdomyolysis [T79.6XXA]                   Active Insurance as of 3/3/2025       Primary Coverage       Payor Plan Insurance Group Employer/Plan Group    ANTHEM MEDICARE REPLACEMENT ANTHEM MEDICARE ADVANTAGE HMO [id number]       Payor Plan Address Payor Plan Phone Number Payor Plan Fax Number Effective Dates    [address] [phone]  3/3/2025 - None Entered    [facility] [id number]         Subscriber Name Subscriber Birth Date Member ID       MADYSON MACIEL  1945 [id number]                     Emergency Contacts        (Rel.) Home Phone Work Phone Mobile Phone    Claudette Maciel (Daughter) [phone] -- [phone]    RAJ MEJIA (Friend) [phone] -- [phone]                 History & Physical        Bishop Chakraborty MD at 25 2230              [facility] Medicine Services  HISTORY AND PHYSICAL    Patient Name: Madyson Maciel   : 1945  MRN: " [id number]  Primary Care Physician: Denise Dickson, ZEINA  Date of admission: 3/3/2025    Subjective  Subjective   Chief Complaint:  Fall and pain    HPI:  Rock Maciel Jr. is a 79 y.o. male with past medical history as listed presents to the emergency room with complaint of a fall and closed head injury and left shoulder region pain.  Patient uses a chairlift to go up his stairs at his house.  Last night he was using his chairlift and reports his pants got caught on the chairlift and this caused him to fall off the chairlift striking his head and his shoulder region on the left.  Patient denies any injuries to his lower extremity.  He was unable to stand due to his shoulder injury and remained on the ground until he was able to call a friend on the phone today.  Appears to have been down for approximately 12 to 18 hours.  Patient does possible occasional cough and chronic lower extremity edema which is relatively stable.  He is feeling dehydrated.      Review of Systems   No current fevers or chills  No current nausea, vomiting, or diarrhea  No current chest pain or palpitations    All other systems reviewed and are negative.     Personal History     Past Medical History:   Diagnosis Date    Allergic rhinitis     Bronchitis     Coronary artery disease     Diabetes mellitus 2001    DM (diabetes mellitus)     Encounter for annual health examination 05/06/2014    Annual Health Assessment    Gout     Hiatal hernia     History of MRSA infection     RIGHT FOOT 2010    Hyperlipidemia     Hypertension     Murmur     Pneumothorax on right     Sleep apnea     pt wears CPAP at night    Wellness examination 06/24/2015    Annual Wellness Visit       Past Surgical History:   Procedure Laterality Date    ARTERY SURGERY Bilateral     carotid    CARDIAC CATHETERIZATION N/A 7/20/2018    Procedure: Left Heart Cath;  Surgeon: Jo Toney MD;  Location: [facility] INVASIVE LOCATION;  Service: Cardiovascular    CARDIAC  CATHETERIZATION N/A 7/20/2018    Procedure: Coronary angiography;  Surgeon: Jo Toney MD;  Location: [facility] CATH INVASIVE LOCATION;  Service: Cardiovascular    CAROTID ENDARTERECTOMY Bilateral     COLONOSCOPY  2008    CORONARY ARTERY BYPASS GRAFT WITH AORTIC VALVE REPAIR/REPLACEMENT N/A 7/23/2018    Procedure: INTRAOPERATIVE ALEX, MIDLINE STERNOTOMY, CORONARY ARTERY BYPASS GRAFTING X 3 USING ENDOSCOPICALLY HARVESTED LEFT GREATER SAPHENOUS VEIN,  AORTIC VALVE REPLACEMENT USING 25MM LOPEZ II ULTRA PORCINE VALVE, PRP;  Surgeon: Phong Posey MD;  Location: [facility] OR;  Service: Cardiothoracic    FOOT SURGERY Right 2010    5th digit removal    FOOT SURGERY Left 2011    1 digit removed    INCISION AND DRAINAGE LEG Right 3/28/2020    Procedure: DEBRIDMENT OF RIGHT CALF;  Surgeon: Ross Pineda MD;  Location: [facility] OR;  Service: Vascular;  Laterality: Right;    INGUINAL HERNIA REPAIR Left 11/29/2024    Procedure: INGUINAL HERNIA REPAIR LAPAROSCOPIC WITH DAVINCI ROBOT;  Surgeon: Phong Lazo MD;  Location: [facility] OR;  Service: Robotics - DaVinci;  Laterality: Left;    QUADRICEPS TENDON REPAIR Left 5/14/2019    Procedure: Left QUADRICEPS TENDON REPAIR;  Surgeon: Camilo Hunter MD;  Location: [facility] OR;  Service: Orthopedics    THORACENTESIS Right 11/21/2016    THORACOSCOPY Right 5/8/2017    Procedure: BRONCHOSCOPY, RIGHT VAT,  TOTAL DECORTICATION RIGHT LUNG, PLEURAL BX, PLACEMENT SUBPLEURAL PAIN CAATHETERS X2;  Surgeon: Donald Orlando III, MD;  Location: [facility] OR;  Service:     TONSILECTOMY, ADENOIDECTOMY, BILATERAL MYRINGOTOMY AND TUBES         Family History: family history includes Hypertension in his father. Other pertinent FHx was reviewed and unremarkable.     Social History:  reports that he has never smoked. He has never been exposed to tobacco smoke. He has never used smokeless tobacco. He reports current alcohol use. He reports that he does not  use drugs.  Social History     Social History Narrative    Not on file       Medications:  Available home medication information reviewed.    Allergies   Allergen Reactions    Ceclor [Cefaclor] Rash     Rash in his 50s; he tolerated piperacillin-tazobactam in March 2020       Objective  Objective   Vital Signs:   Temp:  [97 °F (36.1 °C)-98.2 °F (36.8 °C)] 98.2 °F (36.8 °C)  Heart Rate:  [] 95  Resp:  [18-22] 18  BP: (102-128)/() 128/61        Physical Exam   Constitutional: Awake, alert, elderly and chronically ill-appearing  HENT: Dry membranes moist, neck supple  Respiratory: No cough or wheezes, normal respirations, nonlabored breathing   Cardiovascular: Pulse rate is borderline tachycardia, palpable radial pulses  Gastrointestinal:  Soft, nontender, nondistended  Musculoskeletal: Significantly obese BMI is 36, mild lower extremity edema, physically debilitated in appearance, frail  Psychiatric: Appropriate affect, cooperative, conversational  Neurologic: No slurred speech or facial droop, follows commands  Skin: Some bruising noted, no rashes or jaundice, warm      Results from last 7 days   Lab Units 03/03/25  1408   WBC 10*3/mm3 24.99*   HEMOGLOBIN g/dL 9.5*   HEMATOCRIT % 29.4*   PLATELETS 10*3/mm3 253   INR  2.00*     Results from last 7 days   Lab Units 03/03/25  1747 03/03/25  1408   SODIUM mmol/L  --  136   POTASSIUM mmol/L  --  4.9   CHLORIDE mmol/L  --  100   CO2 mmol/L  --  16.0*   BUN mg/dL  --  64*   CREATININE mg/dL  --  4.22*   GLUCOSE mg/dL  --  197*   CALCIUM mg/dL  --  8.8   ALK PHOS U/L  --  126*   ALT (SGPT) U/L  --  29   AST (SGOT) U/L  --  97*   LACTATE mmol/L 1.6  --    PROCALCITONIN ng/mL  --  4.80*     Estimated Creatinine Clearance: 19.1 mL/min (A) (by C-G formula based on SCr of 4.22 mg/dL (H)).  Brief Urine Lab Results  (Last result in the past 365 days)        Color   Clarity   Blood   Leuk Est   Nitrite   Protein   CREAT   Urine HCG        11/26/24 0207 Yellow   Clear    Large (3+)   Negative   Negative   30 mg/dL (1+)                 Imaging Results (Last 24 Hours)       Procedure Component Value Units Date/Time    US Renal Bilateral [[id number]] Collected: 03/03/25 1704     Updated: 03/03/25 1708    Narrative:      US RENAL BILATERAL-3/3/2025     HISTORY: Renal insufficiency.     Right kidney measures 8.7 cm in length. Left kidney measures 10.1 cm in  length. Urinary bladder is well distended. There appears to be bladder  diverticulum arising posteriorly.     No solid renal masses, stones or hydronephrosis is seen.       Impression:      1. Unremarkable bilateral renal ultrasound.  2. Urinary bladder diverticulum.        This report was finalized on 3/3/2025 5:05 PM by Dr. Marquez Olmstead M.D on Workstation: [id number]       XR Chest 1 View [[id number]] Collected: 03/03/25 1652     Updated: 03/03/25 1658    Narrative:      XR CHEST 1 VW-3/3/2025     HISTORY: Leukocytosis.     Heart size is mildly enlarged. There is some mild atelectasis or  pneumonia in the right lung base. Calcified plaques are seen in the left  hemithorax. Sternotomy wires are seen.       Impression:      1. Mild cardiomegaly.  2. Mild patchy atelectasis or pneumonia in the right lung base.        This report was finalized on 3/3/2025 4:55 PM by Dr. Marquez Olmstead M.D on Workstation: [id number]       CT Head Without Contrast [[id number]] Collected: 03/03/25 1550     Updated: 03/03/25 1603    Narrative:      CT HEAD WO CONTRAST-     INDICATIONS: Head injury     TECHNIQUE: Radiation dose reduction techniques were utilized, including  automated exposure control and exposure modulation based on body size.  Noncontrast head CT     COMPARISON: 1/23/2018     FINDINGS:           No acute intracranial hemorrhage, midline shift or mass effect. No acute  territorial infarct is identified.       Mild periventricular hypodensities suggest chronic small vessel ischemic  change in a patient this age.     Arterial  calcifications are seen at the base of the brain.     Ventricles, cisterns, cerebral sulci are unremarkable for patient age.     Right mastoid air cells are opacified, and right middle ear is partly  opacified. Small paranasal sinus mucosal thickening is present. The  visualized paranasal sinuses, orbits, mastoid air cells are otherwise  unremarkable.          Impression:         No acute intracranial hemorrhage or hydrocephalus. If there is further  clinical concern, MRI could be considered for further evaluation.     This report was finalized on 3/3/2025 4:00 PM by Dr. Azam Alaniz M.D on Workstation: [id number]       XR Shoulder 2+ View Left [[id number]] Collected: 03/03/25 1523     Updated: 03/03/25 1523    Narrative:      XR SHOULDER 2+ VW LEFT-, XR HUMERUS LEFT-     HISTORY: 79-year-old male status post shoulder pain following a fall.     FINDINGS:  There is a comminuted fracture of the distal radius with 1.5 cm caudal  displacement of the clavicular shaft. 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 There is significant narrowing of the subacromial space.  There is no dislocation and the left humerus appears unremarkable.       XR Humerus Left [[id number]] Collected: 03/03/25 1523     Updated: 03/03/25 1523    Narrative:      XR SHOULDER 2+ VW LEFT-, XR HUMERUS LEFT-     HISTORY: 79-year-old male status post shoulder pain following a fall.     FINDINGS:  There is a comminuted fracture of the distal radius with 1.5 cm caudal  displacement of the clavicular shaft. 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 There is significant narrowing of the subacromial space.  There is no dislocation and the left humerus appears unremarkable.             Results for orders placed during the hospital encounter of 10/03/24    Adult Transthoracic Echo Complete W/ Cont if Necessary Per  Protocol    Interpretation Summary    Left ventricular ejection fraction appears to be 61 - 65%.    Left ventricular diastolic function was indeterminate.    The left atrial cavity is moderately dilated.    Left atrial volume is moderately increased.    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    Estimated right ventricular systolic pressure from tricuspid regurgitation is moderately elevated (45-55 mmHg).      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  He was found to have rhabdomyolysis and acute renal failure.    Rhabdomyolysis:  Treat with IV fluid.  Nephrology consulted to assist.  Trend CK.  Supportive care and symptom treatment.    Acute renal failure with CKD 3B with mild metabolic acidosis:  Nephrology consulted.  IV fluid.  Clinically appears dry and likely prerenal, possible ATN.  Hold nephrotoxins and renally dose medications.    Possible pneumonia:  Significantly elevated leukocytosis, right lower  lobe infiltrate, occasional cough.  Suspect possible aspiration pneumonia while patient was down.  Allergy to Ceclor noted.  Patient initiated on vancomycin and Zosyn in emergency room.  Continue Zosyn and check MRSA nasal screen and hold further vancomycin for now unless positive    Clavicle fracture:  Orthopedic consult, suspect will be nonoperative.  It appears there is a typo on x-ray of the shoulder above as radiologist reports clavicle displacement but reports radius fracture.  On my review it appears that the clavicle is fractured.  Plan to reach out to radiologist to clarify.    Fall and physical debility and Charcot foot deformity: PT OT evaluation.  Fall prevention.    Type 2 diabetes: Monitor blood sugar and adjust insulin as needed.  Tradjenta    Atrial fibrillation: Eliquis anticoagulation.  Continue beta-blocker.  Continue home amiodarone.    Hyperlipidemia: Continue statin.  Stable.    Treatment plan discussed with patient who is in agreement.    DVT prophylaxis: Anticoagulant    Treatment plan discussed with patient is in agreement    CODE STATUS:    Code Status and Medical Interventions: CPR (Attempt to Resuscitate); Full Support   Ordered at: 03/03/25 5265     Level Of Support Discussed With:    Patient     Code Status (Patient has no pulse and is not breathing):    CPR (Attempt to Resuscitate)     Medical Interventions (Patient has pulse or is breathing):    Full Support         Bishop Chakraborty MD  03/03/25      Electronically signed by Bishop Chakraborty MD at 03/03/25 0696       Facility-Administered Medications as of 3/4/2025   Medication Dose Route Frequency Provider Last Rate Last Admin    acetaminophen (TYLENOL) tablet 650 mg  650 mg Oral Q4H PRN Bishop Chakraborty MD        Or    acetaminophen (TYLENOL) 160 MG/5ML oral solution 650 mg  650 mg Oral Q4H PRN Bishop Chakraborty MD        Or    acetaminophen (TYLENOL) suppository 650 mg  650 mg Rectal Q4H PRN Palmer  Bishop Miles MD        amiodarone (PACERONE) tablet 200 mg  200 mg Oral Daily Bishop Chakraborty MD   200 mg at 03/04/25 0854    apixaban (ELIQUIS) tablet 2.5 mg  2.5 mg Oral BID Bishop Chakraborty MD   2.5 mg at 03/04/25 0852    ascorbic acid (VITAMIN C) tablet 250 mg  250 mg Oral Daily Bishop Chakraborty MD   250 mg at 03/04/25 0852    atorvastatin (LIPITOR) tablet 20 mg  20 mg Oral Nightly Bishop Chakraborty MD   20 mg at 03/03/25 2215    sennosides-docusate (PERICOLACE) 8.6-50 MG per tablet 2 tablet  2 tablet Oral BID PRN Bishop Chakraborty MD        And    polyethylene glycol (MIRALAX) packet 17 g  17 g Oral Daily PRN Bishop Chakraborty MD        And    bisacodyl (DULCOLAX) EC tablet 5 mg  5 mg Oral Daily PRN Bishop Chakraborty MD        And    bisacodyl (DULCOLAX) suppository 10 mg  10 mg Rectal Daily PRN Bishop Chakraborty MD        cholecalciferol (VITAMIN D3) tablet 1,000 Units  1,000 Units Oral Daily Bishop Chakraborty MD   1,000 Units at 03/04/25 0852    [Held by provider] clopidogrel (PLAVIX) tablet 75 mg  75 mg Oral Daily Bishop Chakraborty MD        dextrose (D50W) (25 g/50 mL) IV injection 25 g  25 g Intravenous Q15 Min PRN Bishop Chakraborty MD        dextrose (GLUTOSE) oral gel 15 g  15 g Oral Q15 Min PRN Bishop Chakraborty MD        famotidine (PEPCID) tablet 10 mg  10 mg Oral BID PRN Bishop Chakraborty MD        glucagon (GLUCAGEN) injection 1 mg  1 mg Intramuscular Q15 Min PRN Bishop Chakraborty MD        [Held by provider] hydrALAZINE (APRESOLINE) tablet 25 mg  25 mg Oral TID Bishop Chakraborty MD        HYDROcodone-acetaminophen (NORCO) 5-325 MG per tablet 0.5 tablet  0.5 tablet Oral Q6H PRN Bishop Chakraborty MD        insulin lispro (HUMALOG/ADMELOG) injection 2-9 Units  2-9 Units Subcutaneous 4x Daily AC & at Bedtime Bishop Chakraborty MD   2 Units at 03/04/25 0854    linagliptin  (TRADJENTA) tablet 5 mg  5 mg Oral Daily Bishop Chakraborty MD   5 mg at 03/04/25 0852    melatonin tablet 2.5 mg  2.5 mg Oral Nightly Bishop Chakraborty MD   2.5 mg at 03/03/25 2219    metoprolol succinate XL (TOPROL-XL) 24 hr tablet 25 mg  25 mg Oral Daily Bishop Chakraborty MD   25 mg at 03/04/25 0852    morphine injection 1 mg  1 mg Intravenous Q4H PRN Bishop Chakraborty MD        And    naloxone (NARCAN) injection 0.4 mg  0.4 mg Intravenous Q5 Min PRN Bishop Chakraborty MD        nitroglycerin (NITROSTAT) SL tablet 0.4 mg  0.4 mg Sublingual Q5 Min PRN Bishop Chakraborty MD        ondansetron ODT (ZOFRAN-ODT) disintegrating tablet 4 mg  4 mg Oral Q6H PRN Bishop Chakraborty MD        Or    ondansetron (ZOFRAN) injection 4 mg  4 mg Intravenous Q6H PRN Bishop Chakraborty MD        piperacillin-tazobactam (ZOSYN) 3.375 g IVPB in 100 mL NS MBP (CD)  3.375 g Intravenous Q8H Bishop Chakraborty MD   3.375 g at 03/04/25 0852    [COMPLETED] piperacillin-tazobactam (ZOSYN) 4.5 g IVPB in 100 mL NS MBP (CD)  4.5 g Intravenous Once Gunner Garcia MD   Stopped at 03/03/25 1854    sodium chloride 0.45 % 925 mL with sodium bicarbonate 8.4 % 75 mEq infusion   Intravenous Continuous Tiesha Mccrary  mL/hr at 03/03/25 2333 New Bag at 03/03/25 2333    [COMPLETED] sodium chloride 0.9 % bolus 1,000 mL  1,000 mL Intravenous Once Gunner Garcia MD   Stopped at 03/03/25 1811    sodium chloride 0.9 % flush 3 mL  3 mL Intravenous Q12H Bishop Chakraborty MD   3 mL at 03/04/25 0854    sodium chloride 0.9 % flush 3-10 mL  3-10 mL Intravenous PRN Bishop Chakraborty MD        sodium chloride 0.9 % infusion 40 mL  40 mL Intravenous PRN Bishop Chakraborty MD        [COMPLETED] vancomycin 2500 mg/500 mL 0.9% NS IVPB ([facility])  20 mg/kg Intravenous Once Gunner Garcia MD   2,500 mg at 03/03/25 5035     Lab Results (last 24 hours)       Procedure Component Value  Units Date/Time    POC Glucose Once [[id number]]  (Abnormal) Collected: 03/04/25 1046    Specimen: Blood Updated: 03/04/25 1049     Glucose 233 mg/dL     POC Glucose Once [[id number]]  (Abnormal) Collected: 03/04/25 0625    Specimen: Blood Updated: 03/04/25 0626     Glucose 155 mg/dL     Comprehensive Metabolic Panel [[id number]]  (Abnormal) Collected: 03/04/25 0446    Specimen: Blood Updated: 03/04/25 0600     Glucose 128 mg/dL      BUN 69 mg/dL      Creatinine 4.06 mg/dL      Sodium 140 mmol/L      Potassium 4.2 mmol/L      Chloride 106 mmol/L      CO2 21.0 mmol/L      Calcium 7.9 mg/dL      Total Protein 5.6 g/dL      Albumin 2.6 g/dL      ALT (SGPT) 30 U/L      AST (SGOT) 79 U/L      Alkaline Phosphatase 94 U/L      Total Bilirubin 0.5 mg/dL      Globulin 3.0 gm/dL      A/G Ratio 0.9 g/dL      BUN/Creatinine Ratio 17.0     Anion Gap 13.0 mmol/L      eGFR 14.2 mL/min/1.73     Narrative:      GFR Categories in Chronic Kidney Disease (CKD)      GFR Category          GFR (mL/min/1.73)    Interpretation  G1                     90 or greater         Normal or high (1)  G2                      60-89                Mild decrease (1)  G3a                   45-59                Mild to moderate decrease  G3b                   30-44                Moderate to severe decrease  G4                    15-29                Severe decrease  G5                    14 or less           Kidney failure          (1)In the absence of evidence of kidney disease, neither GFR category G1 or G2 fulfill the criteria for CKD.    eGFR calculation 2021 CKD-EPI creatinine equation, which does not include race as a factor    CK [[id number]]  (Abnormal) Collected: 03/04/25 0446    Specimen: Blood Updated: 03/04/25 0548     Creatine Kinase 3,020 U/L     CBC (No Diff) [[id number]]  (Abnormal) Collected: 03/04/25 0446    Specimen: Blood Updated: 03/04/25 0509     WBC 14.09 10*3/mm3      RBC 2.39 10*6/mm3      Hemoglobin 7.0 g/dL      Hematocrit 21.0 %       MCV 87.9 fL      MCH 29.3 pg      MCHC 33.3 g/dL      RDW 15.3 %      RDW-SD 49.5 fl      MPV 10.6 fL      Platelets 185 10*3/mm3     MRSA Screen, PCR (Inpatient) - Swab, Nares [[id number]]  (Normal) Collected: 03/03/25 2220    Specimen: Swab from Nares Updated: 03/04/25 0013     MRSA PCR No MRSA Detected    Narrative:      The negative predictive value of this diagnostic test is high and should only be used to consider de-escalating anti-MRSA therapy. A positive result may indicate colonization with MRSA and must be correlated clinically.    POC Glucose Once [[id number]]  (Abnormal) Collected: 03/03/25 2046    Specimen: Blood Updated: 03/03/25 2048     Glucose 207 mg/dL     CK [[id number]]  (Abnormal) Collected: 03/03/25 1803    Specimen: Blood Updated: 03/03/25 1849     Creatine Kinase 4,441 U/L     Lactic Acid, Plasma [[id number]]  (Normal) Collected: 03/03/25 1747    Specimen: Blood from Arm, Left Updated: 03/03/25 1827     Lactate 1.6 mmol/L     [place] Draw [[id number]] Collected: 03/03/25 1408    Specimen: Blood Updated: 03/03/25 1815    Narrative:      The following orders were created for panel order [place] Draw.  Procedure                               Abnormality         Status                     ---------                               -----------         ------                     Green Top (Gel)[[id number]]                                  Final result               Lavender Top[[id number]]                                     Final result               Gold Top - SST[[id number]]                                   Final result               Light Blue Top[[id number]]                                   Final result                 Please view results for these tests on the individual orders.    Gold Top - SST [[id number]] Collected: 03/03/25 1803    Specimen: Blood Updated: 03/03/25 1815     Extra Tube Hold for add-ons.     Comment: Auto resulted.       Hemoglobin A1c [[id number]]  (Abnormal) Collected: 03/03/25  "1408    Specimen: Blood Updated: 03/03/25 1814     Hemoglobin A1C 6.00 %     Narrative:      Hemoglobin A1C Ranges:    Increased Risk for Diabetes  5.7% to 6.4%  Diabetes                     >= 6.5%  Diabetic Goal                < 7.0%    Blood Culture - Blood, Hand, Right [[id number]] Collected: 03/03/25 1745    Specimen: Blood from Hand, Right Updated: 03/03/25 1801    Blood Culture - Blood, Arm, Left [[id number]] Collected: 03/03/25 1747    Specimen: Blood from Arm, Left Updated: 03/03/25 1801    Procalcitonin [[id number]]  (Abnormal) Collected: 03/03/25 1408    Specimen: Blood Updated: 03/03/25 1547     Procalcitonin 4.80 ng/mL     Narrative:      As a Marker for Sepsis (Non-Neonates):    1. <0.5 ng/mL represents a low risk of severe sepsis and/or septic shock.  2. >2 ng/mL represents a high risk of severe sepsis and/or septic shock.    As a Marker for Lower Respiratory Tract Infections that require antibiotic therapy:    PCT on Admission    Antibiotic Therapy       6-12 Hrs later    >0.5                Strongly Recommended  >0.25 - <0.5        Recommended   0.1 - 0.25          Discouraged              Remeasure/reassess PCT  <0.1                Strongly Discouraged     Remeasure/reassess PCT    As 28 day mortality risk marker: \"Change in Procalcitonin Result\" (>80% or <=80%) if Day 0 (or Day 1) and Day 4 values are available. Refer to [email or web address]    Change in PCT <=80%  A decrease of PCT levels below or equal to 80% defines a positive change in PCT test result representing a higher risk for 28-day all-cause mortality of patients diagnosed with severe sepsis for septic shock.    Change in PCT >80%  A decrease of PCT levels of more than 80% defines a negative change in PCT result representing a lower risk for 28-day all-cause mortality of patients diagnosed with severe sepsis or septic shock.       Manual Differential [[id number]]  (Abnormal) Collected: 03/03/25 1408    Specimen: Blood " Updated: 03/03/25 1502     Neutrophil % 89.8 %      Lymphocyte % 1.0 %      Monocyte % 6.1 %      Metamyelocyte % 2.0 %      Myelocyte % 1.0 %      Neutrophils Absolute 22.44 10*3/mm3      Lymphocytes Absolute 0.25 10*3/mm3      Monocytes Absolute 1.52 10*3/mm3      Elliptocytes Slight/1+     Ovalocytes Slight/1+     Poikilocytes Slight/1+     WBC Morphology Normal     Platelet Morphology Normal    CK [[id number]]  (Abnormal) Collected: 03/03/25 1408    Specimen: Blood Updated: 03/03/25 1456     Creatine Kinase 5,033 U/L     Protime-INR [[id number]]  (Abnormal) Collected: 03/03/25 1408    Specimen: Blood Updated: 03/03/25 1450     Protime 22.8 Seconds      INR 2.00    aPTT [[id number]]  (Normal) Collected: 03/03/25 1408    Specimen: Blood Updated: 03/03/25 1450     PTT 33.0 seconds     Comprehensive Metabolic Panel [[id number]]  (Abnormal) Collected: 03/03/25 1408    Specimen: Blood Updated: 03/03/25 1442     Glucose 197 mg/dL      BUN 64 mg/dL      Creatinine 4.22 mg/dL      Sodium 136 mmol/L      Potassium 4.9 mmol/L      Chloride 100 mmol/L      CO2 16.0 mmol/L      Calcium 8.8 mg/dL      Total Protein 7.2 g/dL      Albumin 3.4 g/dL      ALT (SGPT) 29 U/L      AST (SGOT) 97 U/L      Alkaline Phosphatase 126 U/L      Total Bilirubin 0.7 mg/dL      Globulin 3.8 gm/dL      A/G Ratio 0.9 g/dL      BUN/Creatinine Ratio 15.2     Anion Gap 20.0 mmol/L      eGFR 13.5 mL/min/1.73     Narrative:      GFR Categories in Chronic Kidney Disease (CKD)      GFR Category          GFR (mL/min/1.73)    Interpretation  G1                     90 or greater         Normal or high (1)  G2                      60-89                Mild decrease (1)  G3a                   45-59                Mild to moderate decrease  G3b                   30-44                Moderate to severe decrease  G4                    15-29                Severe decrease  G5                    14 or less           Kidney failure          (1)In the absence of  evidence of kidney disease, neither GFR category G1 or G2 fulfill the criteria for CKD.    eGFR calculation 2021 CKD-EPI creatinine equation, which does not include race as a factor    CBC & Differential [[id number]]  (Abnormal) Collected: 03/03/25 1408    Specimen: Blood Updated: 03/03/25 1419    Narrative:      The following orders were created for panel order CBC & Differential.  Procedure                               Abnormality         Status                     ---------                               -----------         ------                     CBC Auto Differential[[id number]]        Abnormal            Final result                 Please view results for these tests on the individual orders.    CBC Auto Differential [[id number]]  (Abnormal) Collected: 03/03/25 1408    Specimen: Blood Updated: 03/03/25 1419     WBC 24.99 10*3/mm3      RBC 3.21 10*6/mm3      Hemoglobin 9.5 g/dL      Hematocrit 29.4 %      MCV 91.6 fL      MCH 29.6 pg      MCHC 32.3 g/dL      RDW 14.2 %      RDW-SD 47.5 fl      MPV 10.7 fL      Platelets 253 10*3/mm3      nRBC 0.0 /100 WBC     Green Top (Gel) [[id number]] Collected: 03/03/25 1408    Specimen: Blood Updated: 03/03/25 1415     Extra Tube Hold for add-ons.     Comment: Auto resulted.       Lavender Top [[id number]] Collected: 03/03/25 1408    Specimen: Blood Updated: 03/03/25 1415     Extra Tube hold for add-on     Comment: Auto resulted       Light Blue Top [[id number]] Collected: 03/03/25 1408    Specimen: Blood Updated: 03/03/25 1415     Extra Tube Hold for add-ons.     Comment: Auto resulted             Imaging Results (Last 24 Hours)       Procedure Component Value Units Date/Time    XR Shoulder 2+ View Left [[id number]] Collected: 03/03/25 1523     Updated: 03/04/25 0719    Narrative:      XR SHOULDER 2+ VW LEFT-, XR HUMERUS LEFT-     HISTORY: 79-year-old male status post shoulder pain following a fall.     FINDINGS:  There is a comminuted fracture of the distal clavicle with  1.5 cm caudal  displacement of the clavicular shaft. 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 There is significant narrowing of the subacromial space.  There is no shoulder dislocation and the left humerus appears  unremarkable.     This report was finalized on 3/4/2025 7:15 AM by Dr. Latrice Rodriguez M.D on  Workstation: [id number]       XR Humerus Left [[id number]] Collected: 03/03/25 1523     Updated: 03/04/25 0719    Narrative:      XR SHOULDER 2+ VW LEFT-, XR HUMERUS LEFT-     HISTORY: 79-year-old male status post shoulder pain following a fall.     FINDINGS:  There is a comminuted fracture of the distal clavicle with 1.5 cm caudal  displacement of the clavicular shaft. 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 There is significant narrowing of the subacromial space.  There is no shoulder dislocation and the left humerus appears  unremarkable.     This report was finalized on 3/4/2025 7:15 AM by Dr. Latrice Rodriguez M.D on  Workstation: [id number]       US Renal Bilateral [[id number]] Collected: 03/03/25 1704     Updated: 03/03/25 1708    Narrative:      US RENAL BILATERAL-3/3/2025     HISTORY: Renal insufficiency.     Right kidney measures 8.7 cm in length. Left kidney measures 10.1 cm in  length. Urinary bladder is well distended. There appears to be bladder  diverticulum arising posteriorly.     No solid renal masses, stones or hydronephrosis is seen.       Impression:      1. Unremarkable bilateral renal ultrasound.  2. Urinary bladder diverticulum.        This report was finalized on 3/3/2025 5:05 PM by Dr. Marquez Olmstead M.D on Workstation: [id number]       XR Chest 1 View [[id number]] Collected: 03/03/25 1652     Updated: 03/03/25 1658    Narrative:      XR CHEST 1 VW-3/3/2025     HISTORY: Leukocytosis.     Heart size is mildly enlarged. There is some mild  atelectasis or  pneumonia in the right lung base. Calcified plaques are seen in the left  hemithorax. Sternotomy wires are seen.       Impression:      1. Mild cardiomegaly.  2. Mild patchy atelectasis or pneumonia in the right lung base.        This report was finalized on 3/3/2025 4:55 PM by Dr. Marquez Olmstead M.D on Workstation: [id number]       CT Head Without Contrast [[id number]] Collected: 03/03/25 1550     Updated: 03/03/25 1603    Narrative:      CT HEAD WO CONTRAST-     INDICATIONS: Head injury     TECHNIQUE: Radiation dose reduction techniques were utilized, including  automated exposure control and exposure modulation based on body size.  Noncontrast head CT     COMPARISON: 1/23/2018     FINDINGS:           No acute intracranial hemorrhage, midline shift or mass effect. No acute  territorial infarct is identified.       Mild periventricular hypodensities suggest chronic small vessel ischemic  change in a patient this age.     Arterial calcifications are seen at the base of the brain.     Ventricles, cisterns, cerebral sulci are unremarkable for patient age.     Right mastoid air cells are opacified, and right middle ear is partly  opacified. Small paranasal sinus mucosal thickening is present. The  visualized paranasal sinuses, orbits, mastoid air cells are otherwise  unremarkable.          Impression:         No acute intracranial hemorrhage or hydrocephalus. If there is further  clinical concern, MRI could be considered for further evaluation.     This report was finalized on 3/3/2025 4:00 PM by Dr. Azam Alaniz M.D on Workstation: [id number]             ECG/EMG Results (last 24 hours)       Procedure Component Value Units Date/Time    Telemetry Scan [[id number]] Resulted: 03/03/25     Updated: 03/03/25 2134    ECG 12 Lead Rhythm Change [[id number]] Collected: 03/03/25 2321     Updated: 03/03/25 2324     QT Interval 421 ms      QTC Interval 503 ms     Narrative:      HEART RATE=86  bpm  RR  Aedgiwch=701  ms  OR Pfussfpx=467  ms  P Horizontal Axis=250  deg  P Front Axis=0  deg  QRSD Vhroxmlc=224  ms  QT Tpqrdlrl=642  ms  FIeT=499  ms  QRS Axis=-66  deg  T Wave Axis=77  deg  - ABNORMAL ECG -  Sinus rhythm  RBBB and LAFB  Probable left ventricular hypertrophy  Lateral infarct, old  Date and Time of Study:2025-03-03 23:21:02    ECG 12 Lead Rhythm Change [[id number]] Collected: 03/03/25 2321     Updated: 03/03/25 2336     QT Interval 420 ms      QTC Interval 502 ms     Narrative:      HEART RATE=86  bpm  RR Crhyzaho=041  ms  OR Seabiaiz=090  ms  P Horizontal Axis=256  deg  P Front Axis=-75  deg  QRSD Kpqdswpl=159  ms  QT Zrycmdyr=515  ms  ONfI=340  ms  QRS Axis=-65  deg  T Wave Axis=76  deg  - ABNORMAL ECG -  Sinus or ectopic atrial rhythm  RBBB and LAFB  Probable  left ventricular hypertrophy  Date and Time of Study:2025-03-03 23:21:10    Telemetry Scan [[id number]] Resulted: 03/03/25     Updated: 03/04/25 0644          Orders (last 24 hrs)        Start     Ordered    03/04/25 1050  POC Glucose Once  PROCEDURE ONCE        Comments: Complete no more than 45 minutes prior to patient eating      03/04/25 1046    03/04/25 0936  Insert 2 Large Bore (18G) Peripheral IVs  Once,   Status:  Canceled         03/04/25 0936    03/04/25 0900  amiodarone (PACERONE) tablet 200 mg  Daily         03/03/25 1740    03/04/25 0702  Inpatient Orthopedic Surgery Consult  IN AM        Specialty:  Orthopedic Surgery  Provider:  Camilo Hunter MD    03/03/25 1741    03/04/25 0627  POC Glucose Once  PROCEDURE ONCE        Comments: Complete no more than 45 minutes prior to patient eating      03/04/25 0625    03/04/25 0600  CBC (No Diff)  Daily       03/03/25 1737    03/04/25 0600  Comprehensive Metabolic Panel  Daily       03/03/25 1737    03/04/25 0304  Inpatient Case Management  Consult  Once        Provider:  (Not yet assigned)    03/04/25 0322    03/04/25 0304  Wound Ostomy Eval & Treat  Once         03/04/25  0322    03/04/25 0022  piperacillin-tazobactam (ZOSYN) 3.375 g IVPB in 100 mL NS MBP (CD)  Every 8 Hours         03/03/25 1736 03/03/25 2329  ECG 12 Lead Rhythm Change  Once         03/03/25 2329 03/03/25 2316  ECG 12 Lead Rhythm Change  STAT         03/03/25 2315    03/03/25 2256  ECG 12 Lead Rhythm Change  Once,   Status:  Canceled         03/03/25 2256 03/03/25 2242  Legionella Antigen, Urine - Urine, Urine, Clean Catch  Once         03/03/25 2241 03/03/25 2242  S. Pneumo Ag Urine or CSF - Urine, Urine, Clean Catch  Once         03/03/25 2241 03/03/25 2200  POC Glucose 4x Daily Before Meals & at Bedtime  4 Times Daily Before Meals & at Bedtime      Comments: Complete no more than 45 minutes prior to patient eating      03/03/25 1742    03/03/25 2100  sodium chloride 0.9 % flush 3 mL  Every 12 Hours Scheduled         03/03/25 1739 03/03/25 2100  melatonin tablet 2.5 mg  Nightly         03/03/25 1739    03/03/25 2100  apixaban (ELIQUIS) tablet 2.5 mg  2 Times Daily         03/03/25 1740    03/03/25 2100  atorvastatin (LIPITOR) tablet 20 mg  Nightly         03/03/25 1740    03/03/25 2049  POC Glucose Once  PROCEDURE ONCE        Comments: Complete no more than 45 minutes prior to patient eating      03/03/25 2046 03/03/25 2000  Vital Signs  Every 4 Hours       03/03/25 1739 03/03/25 1800  Oral Care  2 Times Daily       03/03/25 1739 03/03/25 1758  insulin lispro (HUMALOG/ADMELOG) injection 2-9 Units  4 Times Daily Before Meals & Nightly         03/03/25 1742 03/03/25 1756  cholecalciferol (VITAMIN D3) tablet 1,000 Units  Daily         03/03/25 1740 03/03/25 1756  [Held by provider]  clopidogrel (PLAVIX) tablet 75 mg  Daily        (On hold since yesterday at 1740 until manually unheld; held by Bishop Chakraborty MDHold Reason: Abnormal Labs)    03/03/25 1740 03/03/25 1756  [Held by provider]  hydrALAZINE (APRESOLINE) tablet 25 mg  3 Times Daily        (On hold since yesterday  "at 1740 until manually unheld; held by Bishop Chakraborty MDHold Reason: Abnormal Labs)    03/03/25 1740    03/03/25 1756  linagliptin (TRADJENTA) tablet 5 mg  Daily         03/03/25 1740    03/03/25 1756  metoprolol succinate XL (TOPROL-XL) 24 hr tablet 25 mg  Daily         03/03/25 1740    03/03/25 1756  ascorbic acid (VITAMIN C) tablet 250 mg  Daily         03/03/25 1740    03/03/25 1743  Hemoglobin A1c  Once         03/03/25 1742    03/03/25 1741  dextrose (GLUTOSE) oral gel 15 g  Every 15 Minutes PRN         03/03/25 1742    03/03/25 1741  dextrose (D50W) (25 g/50 mL) IV injection 25 g  Every 15 Minutes PRN         03/03/25 1742    03/03/25 1741  glucagon (GLUCAGEN) injection 1 mg  Every 15 Minutes PRN         03/03/25 1742    03/03/25 1740  Patient May Use Home CPAP / BIPAP For Sleep or As Needed  At Bedtime & As Needed-RT         03/03/25 1739    03/03/25 1739  Diet: Cardiac, Diabetic; Healthy Heart (2-3 Na+); Consistent Carbohydrate; Fluid Consistency: Thin (IDDSI 0)  Diet Effective Now         03/03/25 1739    03/03/25 1738  morphine injection 1 mg  Every 4 Hours PRN        Placed in \"And\" Linked Group    03/03/25 1739    03/03/25 1738  naloxone (NARCAN) injection 0.4 mg  Every 5 Minutes PRN        Placed in \"And\" Linked Group    03/03/25 1739    03/03/25 1738  HYDROcodone-acetaminophen (NORCO) 5-325 MG per tablet 0.5 tablet  Every 6 Hours PRN         03/03/25 1739    03/03/25 1738  Notify Provider (With Default Parameters)  Until Discontinued         03/03/25 1739    03/03/25 1738  Activity - [place]  Until Discontinued         03/03/25 1739 03/03/25 1738  Intake & Output  Every Shift       03/03/25 1739    03/03/25 1738  Weigh Patient  Once         03/03/25 1739    03/03/25 1738  OT Consult: Eval & Treat ADL Performance Below Baseline  Once         03/03/25 1739    03/03/25 1738  PT Consult: Eval & Treat Discharge Placement Assessment  Once         03/03/25 1739    03/03/25 1738  Insert Peripheral " "IV  Once         03/03/25 1739    03/03/25 1738  Saline Lock & Maintain IV Access  Continuous,   Status:  Canceled         03/03/25 1739    03/03/25 1738  Code Status and Medical Interventions: CPR (Attempt to Resuscitate); Full Support  Continuous         03/03/25 1739    03/03/25 1738  Continuous Cardiac Monitoring  Continuous        Comments: Follow Standing Orders As Outlined in Process Instructions (Open Order Report to View Full Instructions)    03/03/25 1739    03/03/25 1738  Maintain IV Access  Continuous         03/03/25 1739    03/03/25 1738  Telemetry - Place Orders & Notify Provider of Results When Patient Experiences Acute Chest Pain, Dysrhythmia or Respiratory Distress  Continuous        Comments: Open Order Report to View Parameters Requiring Provider Notification    03/03/25 1739    03/03/25 1738  May Be Off Telemetry for Tests  Continuous         03/03/25 1739    03/03/25 1737  nitroglycerin (NITROSTAT) SL tablet 0.4 mg  Every 5 Minutes PRN         03/03/25 1739    03/03/25 1737  sodium chloride 0.9 % flush 3-10 mL  As Needed         03/03/25 1739    03/03/25 1737  sodium chloride 0.9 % infusion 40 mL  As Needed         03/03/25 1739    03/03/25 1737  acetaminophen (TYLENOL) tablet 650 mg  Every 4 Hours PRN        Placed in \"Or\" Linked Group    03/03/25 1739    03/03/25 1737  acetaminophen (TYLENOL) 160 MG/5ML oral solution 650 mg  Every 4 Hours PRN        Placed in \"Or\" Linked Group    03/03/25 1739    03/03/25 1737  acetaminophen (TYLENOL) suppository 650 mg  Every 4 Hours PRN        Placed in \"Or\" Linked Group    03/03/25 1739    03/03/25 1737  famotidine (PEPCID) tablet 10 mg  2 Times Daily PRN         03/03/25 1739    03/03/25 1737  sennosides-docusate (PERICOLACE) 8.6-50 MG per tablet 2 tablet  2 Times Daily PRN        Placed in \"And\" Linked Group    03/03/25 1739    03/03/25 1737  polyethylene glycol (MIRALAX) packet 17 g  Daily PRN        Placed in \"And\" Linked Group    03/03/25 1739    " "03/03/25 1737  bisacodyl (DULCOLAX) EC tablet 5 mg  Daily PRN        Placed in \"And\" Linked Group    03/03/25 1739    03/03/25 1737  bisacodyl (DULCOLAX) suppository 10 mg  Daily PRN        Placed in \"And\" Linked Group    03/03/25 1739    03/03/25 1737  ondansetron ODT (ZOFRAN-ODT) disintegrating tablet 4 mg  Every 6 Hours PRN        Placed in \"Or\" Linked Group    03/03/25 1739    03/03/25 1737  ondansetron (ZOFRAN) injection 4 mg  Every 6 Hours PRN        Placed in \"Or\" Linked Group    03/03/25 1739    03/03/25 1736  MRSA Screen, PCR (Inpatient) - Swab, Nares  Once         03/03/25 1735    03/03/25 1730  vancomycin 2500 mg/500 mL 0.9% NS IVPB ([facility])  Once         03/03/25 1648    03/03/25 1727  Cardiac Monitoring  Continuous,   Status:  Canceled        Comments: Follow Standing Orders As Outlined in Process Instructions (Open Order Report to View Full Instructions)    03/03/25 1726    03/03/25 1726  Inpatient Admission  Once         03/03/25 1726    03/03/25 1708  piperacillin-tazobactam (ZOSYN) 4.5 g IVPB in 100 mL NS MBP (CD)  Once         03/03/25 1652    03/03/25 1650  [facility] (on-call MD unless specified) Details  Once        Specialty:  Hospitalist  Provider:  Bishop Chakraborty MD    03/03/25 1649    03/03/25 1648  Obtain & Apply The Following- Upper extremity; Sling  Once         03/03/25 1648    03/03/25 1603  sodium chloride 0.45 % 925 mL with sodium bicarbonate 8.4 % 75 mEq infusion  Continuous         03/03/25 1547    03/03/25 1600  Strict Intake & Output  Every Hour       03/03/25 1545    03/03/25 1552  US Renal Bilateral  1 Time Imaging         03/03/25 1551    03/03/25 1551  CK  Daily       03/03/25 1550    03/03/25 1546  Urinalysis With Microscopic If Indicated (No Culture) - Urine, Clean Catch  Once,   Status:  Canceled         03/03/25 1545    03/03/25 1546  Protein / Creatinine Ratio, Urine - Urine, Clean Catch  Once         03/03/25 1545    03/03/25 1546  Microalbumin / Creatinine Urine Ratio - " Urine, Clean Catch  Once         03/03/25 1545    03/03/25 1546  Bladder Scan  Once         03/03/25 1545    03/03/25 1533  sodium chloride 0.9 % bolus 1,000 mL  Once         03/03/25 1517    03/03/25 1526  Nephrology (on -call MD unless specified)  Once        Specialty:  Nephrology  Provider:  Les Talavera MD    03/03/25 1525    03/03/25 1519  Nephrology (on -call MD unless specified)  Once,   Status:  Canceled        Specialty:  Nephrology  Provider:  (Not yet assigned)    03/03/25 1518    03/03/25 1517  XR Chest 1 View  1 Time Imaging         03/03/25 1517    03/03/25 1517  Blood Culture - Blood, Hand, Right  Once         03/03/25 1517    03/03/25 1517  Blood Culture - Blood, Arm, Left  Once         03/03/25 1517    03/03/25 1517  Lactic Acid, Plasma  Once         03/03/25 1517    03/03/25 1517  Procalcitonin  Once         03/03/25 1517    03/03/25 1452  Manual Differential  Once         03/03/25 1451    03/03/25 1428  XR Humerus Left  1 Time Imaging         03/03/25 1428    03/03/25 1426  Protime-INR  Once         03/03/25 1425    03/03/25 1426  aPTT  Once         03/03/25 1425    03/03/25 1426  CT Head Without Contrast  1 Time Imaging         03/03/25 1425    03/03/25 1426  XR Shoulder 2+ View Left  1 Time Imaging         03/03/25 1425    03/03/25 1418  Manual Differential  Once,   Status:  Canceled         03/03/25 1417    03/03/25 1359  CBC & Differential  Once         03/03/25 1358    03/03/25 1359  Comprehensive Metabolic Panel  Once         03/03/25 1358    03/03/25 1359  Urinalysis With Microscopic If Indicated (No Culture) - Urine, Clean Catch  Once         03/03/25 1358    03/03/25 1359  CK  Once         03/03/25 1358    03/03/25 1359  [place] Draw  Once         03/03/25 1358    03/03/25 1359  CBC Auto Differential  PROCEDURE ONCE         03/03/25 1358    03/03/25 1359  Green Top (Gel)  PROCEDURE ONCE         03/03/25 1358    03/03/25 1359  Lavender Top  PROCEDURE ONCE         03/03/25 1358     25 1359  Gold Top - SST  PROCEDURE ONCE         25 1358    25 1359  Light Blue Top  PROCEDURE ONCE         25 1358    Unscheduled  Wound Care  As Needed       25 1428    Unscheduled  Up With Assistance  As Needed       25 1739    Unscheduled  Oxygen Therapy- Nasal Cannula; Titrate 1-6 LPM Per SpO2; 90 - 95%  Continuous PRN       25 1739    Unscheduled  Follow Hypoglycemia Standing Orders For Blood Glucose <70 & Notify Provider of Treatment  As Needed      Comments: Follow Hypoglycemia Orders As Outlined in Process Instructions (Open Order Report to View Full Instructions)  Notify Provider Any Time Hypoglycemia Treatment is Administered    25 1742                  Operative/Procedure Notes (last 24 hours)  Notes from 25 1110 through 25 1110   No notes of this type exist for this encounter.          Physician Progress Notes (last 24 hours)        Pneny Arana APRN at 25 1526          Per chart review, patient has been followed by [facility], will defer consult to them. Thank you for consideration.     Electronically signed by Penyn Arana APRN at 25 1528          Consult Notes (last 24 hours)        Tiesha Mccrary MD at 25 0938        Consult Orders    1. Nephrology (on -call MD unless specified) [[id number]] ordered by Penny Arana APRN at 25 1525                           Name: Rock Maciel JrJc ADMIT: 3/3/2025   : 1945  PCP: Denise Dickson APRN    MRN: [id number] LOS: 1 days   AGE/SEX: 79 y.o. male  ROOM: [facility]     Date of Service: 3/4/2025                        Reason for Consult:         Acute kidney injury      History of Present Illness:       Patient is a very pleasant 79-year-old gentleman with past medical history of CKD stage III-IV with baseline GFR around 23 to 25 mL/min, baseline creatinine anywhere between 1.9 to 2.4 mg/dL, T2DM, HTN, A-fib, HFpEF, CAD, PVD and other medical comorbid  conditions who presents following a fall with traumatic clavicle fracture.  Patient reports he suffered a mechanical fall which led him to fall down several stairs and remained down for a few hours before he was able to move.  He does not recall any dizziness/lightheadedness/loss of consciousness or blurred vision or presyncopal or syncopal symptoms preceding or following the fall.  No chest pain.  Patient has noticed worsening lower extremity swelling.  He does take Bumex at home.  He states his baseline weight is around 270 pounds.  No overt dyspnea or orthopnea.  Has not been taking any NSAIDs.  On admission, vital signs were largely stable.  He was noted with elevated serum creatinine from baseline at 4.2 mg/dL (baseline creatinine seems to be around 1.9 to 2.4 mg/dL), with CPK of 5000.  CT head: No acute intracranial hemorrhage or hydrocephalus, chest x-ray: Mild cardiomegaly, patchy atelectasis or pneumonia in right lung base, ultrasound unremarkable.  Started on IV fluids and admitted for further management.  Patient reports he has been urinating normally, has not noticed any change in his urine output.  Appetite is below average.  Nephrology consulted for KILLIAN on CKD.      Review of Systems:         Constitutional: No fever, no chills, no lethargy, no weakness.  HEENT:  No headache, otalgia, itchy eyes, nasal discharge or sore throat.  Cardiac:  No chest pain, dyspnea, orthopnea or PND.  Chest:  No cough, phlegm or wheezing.  Abdomen:  No abdominal pain, nausea or vomiting.  Neuro:  No focal weakness, abnormal movements or seizure-like activity.  :   No hematuria, no pyuria, no dysuria, no flank pain.  ROS was otherwise negative except as mentioned in the [place].       Past medical history, surgical history, social history, family history, allergies and all medications reviewed and agreed.      Past History/Allergies?Social History:     Past Medical History:   Diagnosis Date    Allergic rhinitis      Bronchitis     Coronary artery disease     Diabetes mellitus 2001    DM (diabetes mellitus)     Encounter for annual health examination 05/06/2014    Annual Health Assessment    Gout     Hiatal hernia     History of MRSA infection     RIGHT FOOT 2010    Hyperlipidemia     Hypertension     Murmur     Pneumothorax on right     Sleep apnea     pt wears CPAP at night    Wellness examination 06/24/2015    Annual Wellness Visit         Past Surgical History :     Past Surgical History:   Procedure Laterality Date    ARTERY SURGERY Bilateral     carotid    CARDIAC CATHETERIZATION N/A 7/20/2018    Procedure: Left Heart Cath;  Surgeon: Jo Toney MD;  Location: [facility] CATH INVASIVE LOCATION;  Service: Cardiovascular    CARDIAC CATHETERIZATION N/A 7/20/2018    Procedure: Coronary angiography;  Surgeon: Jo Toney MD;  Location: [facility] CATH INVASIVE LOCATION;  Service: Cardiovascular    CAROTID ENDARTERECTOMY Bilateral     COLONOSCOPY  2008    CORONARY ARTERY BYPASS GRAFT WITH AORTIC VALVE REPAIR/REPLACEMENT N/A 7/23/2018    Procedure: INTRAOPERATIVE ALEX, MIDLINE STERNOTOMY, CORONARY ARTERY BYPASS GRAFTING X 3 USING ENDOSCOPICALLY HARVESTED LEFT GREATER SAPHENOUS VEIN,  AORTIC VALVE REPLACEMENT USING 25MM LOPEZ II ULTRA PORCINE VALVE, PRP;  Surgeon: Phong Posey MD;  Location: [facility] OR;  Service: Cardiothoracic    FOOT SURGERY Right 2010    5th digit removal    FOOT SURGERY Left 2011    1 digit removed    INCISION AND DRAINAGE LEG Right 3/28/2020    Procedure: DEBRIDMENT OF RIGHT CALF;  Surgeon: Ross Pineda MD;  Location: [facility] OR;  Service: Vascular;  Laterality: Right;    INGUINAL HERNIA REPAIR Left 11/29/2024    Procedure: INGUINAL HERNIA REPAIR LAPAROSCOPIC WITH DAVINCI ROBOT;  Surgeon: Phong Lazo MD;  Location: [facility] OR;  Service: Robotics - DaVinci;  Laterality: Left;    QUADRICEPS TENDON REPAIR Left 5/14/2019    Procedure: Left QUADRICEPS TENDON  REPAIR;  Surgeon: Camilo Hunter MD;  Location: [facility] OR;  Service: Orthopedics    THORACENTESIS Right 11/21/2016    THORACOSCOPY Right 5/8/2017    Procedure: BRONCHOSCOPY, RIGHT VAT,  TOTAL DECORTICATION RIGHT LUNG, PLEURAL BX, PLACEMENT SUBPLEURAL PAIN CAATHETERS X2;  Surgeon: Donald Orlando III, MD;  Location: [facility] OR;  Service:     TONSILECTOMY, ADENOIDECTOMY, BILATERAL MYRINGOTOMY AND TUBES            Family History:     Family History   Problem Relation Age of Onset    Hypertension Father     Malig Hyperthermia Neg Hx           Social History:     Social History     Tobacco Use    Smoking status: Never     Passive exposure: Never    Smokeless tobacco: Never   Substance Use Topics    Alcohol use: Yes     Comment: either one glass wine or one glass liquor per  day          Allergies:     Allergies   Allergen Reactions    Ceclor [Cefaclor] Rash     Rash in his 50s; he tolerated piperacillin-tazobactam in March 2020         Home meds:     Medications Prior to Admission   Medication Sig Dispense Refill Last Dose/Taking    acetaminophen (TYLENOL) 325 MG tablet Take 2 tablets by mouth Every 6 (Six) Hours As Needed for Mild Pain.   3/2/2025    amiodarone (PACERONE) 200 MG tablet Take 1 tablet by mouth Daily. 30 tablet 5 3/2/2025    apixaban (ELIQUIS) 2.5 MG tablet tablet Take 1 tablet by mouth 2 (Two) Times a Day. Indications: Atrial Fibrillation 60 tablet 5 3/2/2025    atorvastatin (LIPITOR) 20 MG tablet Take 1 tablet by mouth Every Night. NEED TO SEE PROVIDER FOR FUTURE REFILLS. 30 tablet 0 3/2/2025    bumetanide (BUMEX) 1 MG tablet Take 1 tablet by mouth 2 (Two) Times a Day. 60 tablet 5 3/2/2025    Cholecalciferol 25 MCG (1000 UT) tablet Take 1 tablet by mouth Every 7 (Seven) Days.   3/2/2025    clopidogrel (PLAVIX) 75 MG tablet Take 1 tablet by mouth Daily. 90 tablet 1 3/2/2025    glipizide (GLUCOTROL) 5 MG tablet TAKE 1 TABLET BY MOUTH 2 TIMES A DAY BEFORE MEALS. 180 tablet 1 3/2/2025    glucose  blood (Accu-Chek Keshia Plus) test strip USE TO TEST BLOOD SUGAR    TWICE DAILY FOR DIABETES 100 each 8 3/2/2025    hydrALAZINE (APRESOLINE) 25 MG tablet Take 1 tablet by mouth 3 (Three) Times a Day. 90 tablet 5 3/2/2025    linagliptin (Tradjenta) 5 MG tablet tablet Take 1 tablet by mouth Daily. 90 tablet 1 Past Week    metoprolol succinate XL (TOPROL-XL) 25 MG 24 hr tablet Take 1 tablet by mouth Daily. 30 tablet 5 3/2/2025    terazosin (HYTRIN) 2 MG capsule Take 1 capsule by mouth Every Night. 90 capsule 1 Past Week    vitamin C (ASCORBIC ACID) 250 MG tablet Take 1 tablet by mouth Daily.   3/2/2025    Zinc 50 MG tablet    3/2/2025    nitroglycerin (NITROSTAT) 0.4 MG SL tablet Place 1 tablet under the tongue Every 5 (Five) Minutes As Needed for Chest Pain (Only if SBP Greater Than 100). Take no more than 3 doses in 15 minutes. (Patient not taking: Reported on 2024) 25 tablet 0 Unknown         Scheduled meds:   amiodarone, 200 mg, Oral, Daily  apixaban, 2.5 mg, Oral, BID  vitamin C, 250 mg, Oral, Daily  atorvastatin, 20 mg, Oral, Nightly  cholecalciferol, 1,000 Units, Oral, Daily  [Held by provider] clopidogrel, 75 mg, Oral, Daily  [Held by provider] hydrALAZINE, 25 mg, Oral, TID  insulin lispro, 2-9 Units, Subcutaneous, 4x Daily AC & at Bedtime  linagliptin, 5 mg, Oral, Daily  melatonin, 2.5 mg, Oral, Nightly  metoprolol succinate XL, 25 mg, Oral, Daily  piperacillin-tazobactam, 3.375 g, Intravenous, Q8H  sodium chloride, 3 mL, Intravenous, Q12H          Objectives:     tMax 24 hrs: Temp (24hrs), Av.3 °F (36.8 °C), Min:97 °F (36.1 °C), Max:99.3 °F (37.4 °C)      Vitals Ranges:   Temp:  [97 °F (36.1 °C)-99.3 °F (37.4 °C)] 99.3 °F (37.4 °C)  Heart Rate:  [] 85  Resp:  [18-22] 18  BP: (102-128)/() 121/52    Intake and Output Last 3 Shifts:   I/O last 3 completed shifts:  In: 1580 [P.O.:480; IV Piggyback:1100]  Out: 200 [Urine:200]      Exam:     /52 (BP Location: Right arm, Patient Position:  "Lying)   Pulse 85   Temp 99.3 °F (37.4 °C) (Oral)   Resp 18   Ht 185.4 cm (73\")   Wt 127 kg (279 lb 15.8 oz)   SpO2 97%   BMI 36.94 kg/m²     GEN: Pleasant chronically ill elderly gentleman in no acute distress  EYES: PERRLA  HEENT: no abnormality  MOUTH: moist Mucous membranes  NECK: no visible JVD  RESP: clear to auscultation bilaterally, no crackles/wheezing  CVS: RRR, S1, S2+  ABD: soft, nontender, nondistended  NEURO: AAOX3  EXT: 1+ bilateral ankle edema  SKIN: no visible rash  JOINT: no visible effusions  PSYCH: pleasant conversational affect          Data Review:       Labs reviewed    Imaging:      Radiology reviewed   Results for orders placed during the hospital encounter of 10/03/24    Adult Transthoracic Echo Complete W/ Cont if Necessary Per Protocol    Interpretation Summary    Left ventricular ejection fraction appears to be 61 - 65%.    Left ventricular diastolic function was indeterminate.    The left atrial cavity is moderately dilated.    Left atrial volume is moderately increased.    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    Estimated right ventricular systolic pressure from tricuspid regurgitation is moderately elevated (45-55 mmHg).       Assessment:        Traumatic rhabdomyolysis    Arteriosclerosis of coronary artery    Essential hypertension    Type 2 diabetes mellitus with circulatory disorder, without long-term current use of insulin    Hyperlipidemia    Charcot-Davida-Tooth disease-like deformity of foot    Fall    S/P CABG x 2    KILLIAN (acute kidney injury)    Chronic diastolic (congestive) heart failure    Stage 3b chronic kidney disease    Chronic anticoagulation    Persistent atrial fibrillation    Closed displaced fracture of left clavicle    Pneumonia due to infectious organism    KILLIAN: likely ATN related to rhabdomyolysis, prerenal insult related to diuretics etc.  Mild met acidosis  Rhabdomyolysis related to fall: Improving.  CKD stage III: Baseline creatinine 1.3 to " 2.4 mg/dL.  Clavicle fracture  A-fib with controlled rates.  History of HFpEF with pulmonary hypertension      Plan:     Creatinine plateauing/stabilizing.  Continue buffered fluids with close monitoring of urine output.  Monitor volume status closely: May require some gentle diuresis imminently.    D/W RN.  Thanks for consultation.    Tiesha Mccrary MD  [facility]  3/4/2025  09:38 EST      Electronically signed by Tiesha Mccrary MD at 03/04/25 0912

## 2025-03-04 NOTE — SIGNIFICANT NOTE
03/04/25 1304   OTHER   Discipline occupational therapist   Rehab Time/Intention   Session Not Performed other (see comments)  (Awaiting Ortho recommendations on comminuted distal clavice fracture prior to OT intervention. OT will await further recommendations prior to eval completion. Will follow up as able.)   Recommendation   OT - Next Appointment 03/05/25

## 2025-03-04 NOTE — CONSULTS
Name: Rock Maciel Jr. ADMIT: 3/3/2025   : 1945  PCP: Denise Dickson APRN    MRN: [id number] LOS: 1 days   AGE/SEX: 79 y.o. male  ROOM: [facility]     Date of Service: 3/4/2025                        Reason for Consult:         Acute kidney injury      History of Present Illness:       Patient is a very pleasant 79-year-old gentleman with past medical history of CKD stage III-IV with baseline GFR around 23 to 25 mL/min, baseline creatinine anywhere between 1.9 to 2.4 mg/dL, T2DM, HTN, A-fib, HFpEF, CAD, PVD and other medical comorbid conditions who presents following a fall with traumatic clavicle fracture.  Patient reports he suffered a mechanical fall which led him to fall down several stairs and remained down for a few hours before he was able to move.  He does not recall any dizziness/lightheadedness/loss of consciousness or blurred vision or presyncopal or syncopal symptoms preceding or following the fall.  No chest pain.  Patient has noticed worsening lower extremity swelling.  He does take Bumex at home.  He states his baseline weight is around 270 pounds.  No overt dyspnea or orthopnea.  Has not been taking any NSAIDs.  On admission, vital signs were largely stable.  He was noted with elevated serum creatinine from baseline at 4.2 mg/dL (baseline creatinine seems to be around 1.9 to 2.4 mg/dL), with CPK of 5000.  CT head: No acute intracranial hemorrhage or hydrocephalus, chest x-ray: Mild cardiomegaly, patchy atelectasis or pneumonia in right lung base, ultrasound unremarkable.  Started on IV fluids and admitted for further management.  Patient reports he has been urinating normally, has not noticed any change in his urine output.  Appetite is below average.  Nephrology consulted for KILLIAN on CKD.      Review of Systems:         Constitutional: No fever, no chills, no lethargy, no weakness.  HEENT:  No headache, otalgia, itchy eyes, nasal discharge or sore throat.  Cardiac:  No chest  pain, dyspnea, orthopnea or PND.  Chest:  No cough, phlegm or wheezing.  Abdomen:  No abdominal pain, nausea or vomiting.  Neuro:  No focal weakness, abnormal movements or seizure-like activity.  :   No hematuria, no pyuria, no dysuria, no flank pain.  ROS was otherwise negative except as mentioned in the [place].       Past medical history, surgical history, social history, family history, allergies and all medications reviewed and agreed.      Past History/Allergies?Social History:     Past Medical History:   Diagnosis Date    Allergic rhinitis     Bronchitis     Coronary artery disease     Diabetes mellitus 2001    DM (diabetes mellitus)     Encounter for annual health examination 05/06/2014    Annual Health Assessment    Gout     Hiatal hernia     History of MRSA infection     RIGHT FOOT 2010    Hyperlipidemia     Hypertension     Murmur     Pneumothorax on right     Sleep apnea     pt wears CPAP at night    Wellness examination 06/24/2015    Annual Wellness Visit         Past Surgical History :     Past Surgical History:   Procedure Laterality Date    ARTERY SURGERY Bilateral     carotid    CARDIAC CATHETERIZATION N/A 7/20/2018    Procedure: Left Heart Cath;  Surgeon: Jo Toney MD;  Location: [facility] CATH INVASIVE LOCATION;  Service: Cardiovascular    CARDIAC CATHETERIZATION N/A 7/20/2018    Procedure: Coronary angiography;  Surgeon: Jo Toney MD;  Location: [facility] CATH INVASIVE LOCATION;  Service: Cardiovascular    CAROTID ENDARTERECTOMY Bilateral     COLONOSCOPY  2008    CORONARY ARTERY BYPASS GRAFT WITH AORTIC VALVE REPAIR/REPLACEMENT N/A 7/23/2018    Procedure: INTRAOPERATIVE ALEX, MIDLINE STERNOTOMY, CORONARY ARTERY BYPASS GRAFTING X 3 USING ENDOSCOPICALLY HARVESTED LEFT GREATER SAPHENOUS VEIN,  AORTIC VALVE REPLACEMENT USING 25MM LOPEZ II ULTRA PORCINE VALVE, PRP;  Surgeon: Phong Posey MD;  Location: [facility] OR;  Service: Cardiothoracic    FOOT SURGERY Right 2010 5th  digit removal    FOOT SURGERY Left 2011    1 digit removed    INCISION AND DRAINAGE LEG Right 3/28/2020    Procedure: DEBRIDMENT OF RIGHT CALF;  Surgeon: Ross Pineda MD;  [facility];  Service: Vascular;  Laterality: Right;    INGUINAL HERNIA REPAIR Left 11/29/2024    Procedure: INGUINAL HERNIA REPAIR LAPAROSCOPIC WITH DAVINCI ROBOT;  Surgeon: Phong Lazo MD;  Location: [facility] MAIN OR;  Service: Robotics - DaVinci;  Laterality: Left;    QUADRICEPS TENDON REPAIR Left 5/14/2019    Procedure: Left QUADRICEPS TENDON REPAIR;  Surgeon: Camilo Hunter MD;  [facility];  Service: Orthopedics    THORACENTESIS Right 11/21/2016    THORACOSCOPY Right 5/8/2017    Procedure: BRONCHOSCOPY, RIGHT VAT,  TOTAL DECORTICATION RIGHT LUNG, PLEURAL BX, PLACEMENT SUBPLEURAL PAIN CAATHETERS X2;  Surgeon: Donald Orlando III, MD;  Location: [facility] MAIN OR;  Service:     TONSILECTOMY, ADENOIDECTOMY, BILATERAL MYRINGOTOMY AND TUBES            Family History:     Family History   Problem Relation Age of Onset    Hypertension Father     Malig Hyperthermia Neg Hx           Social History:     Social History     Tobacco Use    Smoking status: Never     Passive exposure: Never    Smokeless tobacco: Never   Substance Use Topics    Alcohol use: Yes     Comment: either one glass wine or one glass liquor per  day          Allergies:     Allergies   Allergen Reactions    Ceclor [Cefaclor] Rash     Rash in his 50s; he tolerated piperacillin-tazobactam in March 2020         Home meds:     Medications Prior to Admission   Medication Sig Dispense Refill Last Dose/Taking    acetaminophen (TYLENOL) 325 MG tablet Take 2 tablets by mouth Every 6 (Six) Hours As Needed for Mild Pain.   3/2/2025    amiodarone (PACERONE) 200 MG tablet Take 1 tablet by mouth Daily. 30 tablet 5 3/2/2025    apixaban (ELIQUIS) 2.5 MG tablet tablet Take 1 tablet by mouth 2 (Two) Times a Day. Indications: Atrial Fibrillation 60 tablet  5 3/2/2025    atorvastatin (LIPITOR) 20 MG tablet Take 1 tablet by mouth Every Night. NEED TO SEE PROVIDER FOR FUTURE REFILLS. 30 tablet 0 3/2/2025    bumetanide (BUMEX) 1 MG tablet Take 1 tablet by mouth 2 (Two) Times a Day. 60 tablet 5 3/2/2025    Cholecalciferol 25 MCG (1000 UT) tablet Take 1 tablet by mouth Every 7 (Seven) Days.   3/2/2025    clopidogrel (PLAVIX) 75 MG tablet Take 1 tablet by mouth Daily. 90 tablet 1 3/2/2025    glipizide (GLUCOTROL) 5 MG tablet TAKE 1 TABLET BY MOUTH 2 TIMES A DAY BEFORE MEALS. 180 tablet 1 3/2/2025    glucose blood (Accu-Chek Keshia Plus) test strip USE TO TEST BLOOD SUGAR    TWICE DAILY FOR DIABETES 100 each 8 3/2/2025    hydrALAZINE (APRESOLINE) 25 MG tablet Take 1 tablet by mouth 3 (Three) Times a Day. 90 tablet 5 3/2/2025    linagliptin (Tradjenta) 5 MG tablet tablet Take 1 tablet by mouth Daily. 90 tablet 1 Past Week    metoprolol succinate XL (TOPROL-XL) 25 MG 24 hr tablet Take 1 tablet by mouth Daily. 30 tablet 5 3/2/2025    terazosin (HYTRIN) 2 MG capsule Take 1 capsule by mouth Every Night. 90 capsule 1 Past Week    vitamin C (ASCORBIC ACID) 250 MG tablet Take 1 tablet by mouth Daily.   3/2/2025    Zinc 50 MG tablet    3/2/2025    nitroglycerin (NITROSTAT) 0.4 MG SL tablet Place 1 tablet under the tongue Every 5 (Five) Minutes As Needed for Chest Pain (Only if SBP Greater Than 100). Take no more than 3 doses in 15 minutes. (Patient not taking: Reported on 12/20/2024) 25 tablet 0 Unknown         Scheduled meds:   amiodarone, 200 mg, Oral, Daily  apixaban, 2.5 mg, Oral, BID  vitamin C, 250 mg, Oral, Daily  atorvastatin, 20 mg, Oral, Nightly  cholecalciferol, 1,000 Units, Oral, Daily  [Held by provider] clopidogrel, 75 mg, Oral, Daily  [Held by provider] hydrALAZINE, 25 mg, Oral, TID  insulin lispro, 2-9 Units, Subcutaneous, 4x Daily AC & at Bedtime  linagliptin, 5 mg, Oral, Daily  melatonin, 2.5 mg, Oral, Nightly  metoprolol succinate XL, 25 mg, Oral,  "Daily  piperacillin-tazobactam, 3.375 g, Intravenous, Q8H  sodium chloride, 3 mL, Intravenous, Q12H          Objectives:     tMax 24 hrs: Temp (24hrs), Av.3 °F (36.8 °C), Min:97 °F (36.1 °C), Max:99.3 °F (37.4 °C)      Vitals Ranges:   Temp:  [97 °F (36.1 °C)-99.3 °F (37.4 °C)] 99.3 °F (37.4 °C)  Heart Rate:  [] 85  Resp:  [18-22] 18  BP: (102-128)/() 121/52    Intake and Output Last 3 Shifts:   I/O last 3 completed shifts:  In: 1580 [P.O.:480; IV Piggyback:1100]  Out: 200 [Urine:200]      Exam:     /52 (BP Location: Right arm, Patient Position: Lying)   Pulse 85   Temp 99.3 °F (37.4 °C) (Oral)   Resp 18   Ht 185.4 cm (73\")   Wt 127 kg (279 lb 15.8 oz)   SpO2 97%   BMI 36.94 kg/m²     GEN: Pleasant chronically ill elderly gentleman in no acute distress  EYES: PERRLA  HEENT: no abnormality  MOUTH: moist Mucous membranes  NECK: no visible JVD  RESP: clear to auscultation bilaterally, no crackles/wheezing  CVS: RRR, S1, S2+  ABD: soft, nontender, nondistended  NEURO: AAOX3  EXT: 1+ bilateral ankle edema  SKIN: no visible rash  JOINT: no visible effusions  PSYCH: pleasant conversational affect          Data Review:       Labs reviewed    Imaging:      Radiology reviewed   Results for orders placed during the hospital encounter of 10/03/24    Adult Transthoracic Echo Complete W/ Cont if Necessary Per Protocol    Interpretation Summary    Left ventricular ejection fraction appears to be 61 - 65%.    Left ventricular diastolic function was indeterminate.    The left atrial cavity is moderately dilated.    Left atrial volume is moderately increased.    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    Estimated right ventricular systolic pressure from tricuspid regurgitation is moderately elevated (45-55 mmHg).       Assessment:        Traumatic rhabdomyolysis    Arteriosclerosis of coronary artery    Essential hypertension    Type 2 diabetes mellitus with circulatory disorder, without long-term " current use of insulin    Hyperlipidemia    Charcot-Davdia-Tooth disease-like deformity of foot    Fall    S/P CABG x 2    KILLIAN (acute kidney injury)    Chronic diastolic (congestive) heart failure    Stage 3b chronic kidney disease    Chronic anticoagulation    Persistent atrial fibrillation    Closed displaced fracture of left clavicle    Pneumonia due to infectious organism    KILLIAN: likely ATN related to rhabdomyolysis, prerenal insult related to diuretics etc.  Mild met acidosis  Rhabdomyolysis related to fall: Improving.  CKD stage III: Baseline creatinine 1.3 to 2.4 mg/dL.  Clavicle fracture  A-fib with controlled rates.  History of HFpEF with pulmonary hypertension      Plan:     Creatinine plateauing/stabilizing.  Continue buffered fluids with close monitoring of urine output.  Monitor volume status closely: May require some gentle diuresis imminently.    D/W RN.  Thanks for consultation.    Tiesha Mccrary MD  [facility]  3/4/2025  09:38 EST

## 2025-03-04 NOTE — NURSING NOTE
Reason for Visit: CWCN: We see patient at request of floor nurse regarding skin issue on rt foot. Patient is alert, upon assessment black scab was observed towards plantar site of rt foot, with calloused appearance, possibly related to Diabetic foot.  Treatment Plan/Recommendations: Iodosorb gel ordered, cover with Optifoam Gentle  Wound Team Follow up Plan: WCN will follow up according his needs.

## 2025-03-04 NOTE — ED NOTES
Pt has sleep apnea, is dropping spo2 to 88% when asleep - nc o2 at 2lpm placed per Dr Radha zamora

## 2025-03-04 NOTE — H&P
[facility] Medicine Services  HISTORY AND PHYSICAL    Patient Name: Rock Maciel Jr.  : 1945  MRN: [id number]  Primary Care Physician: Denise Dickson, ZEINA  Date of admission: 3/3/2025    Subjective   Subjective   Chief Complaint:  Fall and pain    HPI:  Rock Maciel Jr. is a 79 y.o. male with past medical history as listed presents to the emergency room with complaint of a fall and closed head injury and left shoulder region pain.  Patient uses a chairlift to go up his stairs at his house.  Last night he was using his chairlift and reports his pants got caught on the chairlift and this caused him to fall off the chairlift striking his head and his shoulder region on the left.  Patient denies any injuries to his lower extremity.  He was unable to stand due to his shoulder injury and remained on the ground until he was able to call a friend on the phone today.  Appears to have been down for approximately 12 to 18 hours.  Patient does possible occasional cough and chronic lower extremity edema which is relatively stable.  He is feeling dehydrated.      Review of Systems   No current fevers or chills  No current nausea, vomiting, or diarrhea  No current chest pain or palpitations    All other systems reviewed and are negative.     Personal History     Past Medical History:   Diagnosis Date   • Allergic rhinitis    • Bronchitis    • Coronary artery disease    • Diabetes mellitus    • DM (diabetes mellitus)    • Encounter for annual health examination 2014    Annual Health Assessment   • Gout    • Hiatal hernia    • History of MRSA infection     RIGHT FOOT    • Hyperlipidemia    • Hypertension    • Murmur    • Pneumothorax on right    • Sleep apnea     pt wears CPAP at night   • Wellness examination 2015    Annual Wellness Visit       Past Surgical History:   Procedure Laterality Date   • ARTERY SURGERY Bilateral     carotid   • CARDIAC CATHETERIZATION N/A 2018     Procedure: Left Heart Cath;  Surgeon: Jo Toney MD;  Location: [facility] CATH INVASIVE LOCATION;  Service: Cardiovascular   • CARDIAC CATHETERIZATION N/A 7/20/2018    Procedure: Coronary angiography;  Surgeon: Jo Toney MD;  [facility];  Service: Cardiovascular   • CAROTID ENDARTERECTOMY Bilateral    • COLONOSCOPY  2008   • CORONARY ARTERY BYPASS GRAFT WITH AORTIC VALVE REPAIR/REPLACEMENT N/A 7/23/2018    Procedure: INTRAOPERATIVE ALEX, MIDLINE STERNOTOMY, CORONARY ARTERY BYPASS GRAFTING X 3 USING ENDOSCOPICALLY HARVESTED LEFT GREATER SAPHENOUS VEIN,  AORTIC VALVE REPLACEMENT USING 25MM LOPEZ II ULTRA PORCINE VALVE, PRP;  Surgeon: Phong Posey MD;  Location: [facility] OR;  Service: Cardiothoracic   • FOOT SURGERY Right 2010    5th digit removal   • FOOT SURGERY Left 2011    1 digit removed   • INCISION AND DRAINAGE LEG Right 3/28/2020    Procedure: DEBRIDMENT OF RIGHT CALF;  Surgeon: Ross Pineda MD;  Location: [facility] OR;  Service: Vascular;  Laterality: Right;   • INGUINAL HERNIA REPAIR Left 11/29/2024    Procedure: INGUINAL HERNIA REPAIR LAPAROSCOPIC WITH DAVINCI ROBOT;  Surgeon: Phong Lazo MD;  Location: [facility] OR;  Service: Robotics - DaVinci;  Laterality: Left;   • QUADRICEPS TENDON REPAIR Left 5/14/2019    Procedure: Left QUADRICEPS TENDON REPAIR;  Surgeon: Camilo Hunter MD;  Location: [facility] OR;  Service: Orthopedics   • THORACENTESIS Right 11/21/2016   • THORACOSCOPY Right 5/8/2017    Procedure: BRONCHOSCOPY, RIGHT VAT,  TOTAL DECORTICATION RIGHT LUNG, PLEURAL BX, PLACEMENT SUBPLEURAL PAIN CAATHETERS X2;  Surgeon: Donald Orlando III, MD;  Location: [facility] OR;  Service:    • TONSILECTOMY, ADENOIDECTOMY, BILATERAL MYRINGOTOMY AND TUBES         Family History: family history includes Hypertension in his father. Other pertinent FHx was reviewed and unremarkable.     Social History:  reports that he has  never smoked. He has never been exposed to tobacco smoke. He has never used smokeless tobacco. He reports current alcohol use. He reports that he does not use drugs.  Social History     Social History Narrative   • Not on file       Medications:  Available home medication information reviewed.    Allergies   Allergen Reactions   • Sophie [Cefaclor] Rash     Rash in his 50s; he tolerated piperacillin-tazobactam in March 2020       Objective   Objective   Vital Signs:   Temp:  [97 °F (36.1 °C)-98.2 °F (36.8 °C)] 98.2 °F (36.8 °C)  Heart Rate:  [] 95  Resp:  [18-22] 18  BP: (102-128)/() 128/61        Physical Exam   Constitutional: Awake, alert, elderly and chronically ill-appearing  HENT: Dry membranes moist, neck supple  Respiratory: No cough or wheezes, normal respirations, nonlabored breathing   Cardiovascular: Pulse rate is borderline tachycardia, palpable radial pulses  Gastrointestinal:  Soft, nontender, nondistended  Musculoskeletal: Significantly obese BMI is 36, mild lower extremity edema, physically debilitated in appearance, frail  Psychiatric: Appropriate affect, cooperative, conversational  Neurologic: No slurred speech or facial droop, follows commands  Skin: Some bruising noted, no rashes or jaundice, warm      Results from last 7 days   Lab Units 03/03/25  1408   WBC 10*3/mm3 24.99*   HEMOGLOBIN g/dL 9.5*   HEMATOCRIT % 29.4*   PLATELETS 10*3/mm3 253   INR  2.00*     Results from last 7 days   Lab Units 03/03/25  1747 03/03/25  1408   SODIUM mmol/L  --  136   POTASSIUM mmol/L  --  4.9   CHLORIDE mmol/L  --  100   CO2 mmol/L  --  16.0*   BUN mg/dL  --  64*   CREATININE mg/dL  --  4.22*   GLUCOSE mg/dL  --  197*   CALCIUM mg/dL  --  8.8   ALK PHOS U/L  --  126*   ALT (SGPT) U/L  --  29   AST (SGOT) U/L  --  97*   LACTATE mmol/L 1.6  --    PROCALCITONIN ng/mL  --  4.80*     Estimated Creatinine Clearance: 19.1 mL/min (A) (by C-G formula based on SCr of 4.22 mg/dL (H)).  Brief Urine Lab Results   (Last result in the past 365 days)        Color   Clarity   Blood   Leuk Est   Nitrite   Protein   CREAT   Urine HCG        11/26/24 0207 Yellow   Clear   Large (3+)   Negative   Negative   30 mg/dL (1+)                 Imaging Results (Last 24 Hours)       Procedure Component Value Units Date/Time    US Renal Bilateral [[id number]] Collected: 03/03/25 1704     Updated: 03/03/25 1708    Narrative:      US RENAL BILATERAL-3/3/2025     HISTORY: Renal insufficiency.     Right kidney measures 8.7 cm in length. Left kidney measures 10.1 cm in  length. Urinary bladder is well distended. There appears to be bladder  diverticulum arising posteriorly.     No solid renal masses, stones or hydronephrosis is seen.       Impression:      1. Unremarkable bilateral renal ultrasound.  2. Urinary bladder diverticulum.        This report was finalized on 3/3/2025 5:05 PM by Dr. Marquez Olmstead M.D on Workstation: [id number]       XR Chest 1 View [[id number]] Collected: 03/03/25 1652     Updated: 03/03/25 1658    Narrative:      XR CHEST 1 VW-3/3/2025     HISTORY: Leukocytosis.     Heart size is mildly enlarged. There is some mild atelectasis or  pneumonia in the right lung base. Calcified plaques are seen in the left  hemithorax. Sternotomy wires are seen.       Impression:      1. Mild cardiomegaly.  2. Mild patchy atelectasis or pneumonia in the right lung base.        This report was finalized on 3/3/2025 4:55 PM by Dr. Marquez Olmstead M.D on Workstation: [id number]       CT Head Without Contrast [[id number]] Collected: 03/03/25 1550     Updated: 03/03/25 1603    Narrative:      CT HEAD WO CONTRAST-     INDICATIONS: Head injury     TECHNIQUE: Radiation dose reduction techniques were utilized, including  automated exposure control and exposure modulation based on body size.  Noncontrast head CT     COMPARISON: 1/23/2018     FINDINGS:           No acute intracranial hemorrhage, midline shift or mass effect. No  acute  territorial infarct is identified.       Mild periventricular hypodensities suggest chronic small vessel ischemic  change in a patient this age.     Arterial calcifications are seen at the base of the brain.     Ventricles, cisterns, cerebral sulci are unremarkable for patient age.     Right mastoid air cells are opacified, and right middle ear is partly  opacified. Small paranasal sinus mucosal thickening is present. The  visualized paranasal sinuses, orbits, mastoid air cells are otherwise  unremarkable.          Impression:         No acute intracranial hemorrhage or hydrocephalus. If there is further  clinical concern, MRI could be considered for further evaluation.     This report was finalized on 3/3/2025 4:00 PM by Dr. Azam Alaniz M.D on Workstation: [id number]       XR Shoulder 2+ View Left [[id number]] Collected: 03/03/25 1523     Updated: 03/03/25 1523    Narrative:      XR SHOULDER 2+ VW LEFT-, XR HUMERUS LEFT-     HISTORY: 79-year-old male status post shoulder pain following a fall.     FINDINGS:  There is a comminuted fracture of the distal radius with 1.5 cm caudal  displacement of the clavicular shaft. 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 There is significant narrowing of the subacromial space.  There is no dislocation and the left humerus appears unremarkable.       XR Humerus Left [[id number]] Collected: 03/03/25 1523     Updated: 03/03/25 1523    Narrative:      XR SHOULDER 2+ VW LEFT-, XR HUMERUS LEFT-     HISTORY: 79-year-old male status post shoulder pain following a fall.     FINDINGS:  There is a comminuted fracture of the distal radius with 1.5 cm caudal  displacement of the clavicular shaft. 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 There is significant narrowing of the subacromial space.  There is no dislocation and the left humerus  appears unremarkable.             Results for orders placed during the hospital encounter of 10/03/24    Adult Transthoracic Echo Complete W/ Cont if Necessary Per Protocol    Interpretation Summary  •  Left ventricular ejection fraction appears to be 61 - 65%.  •  Left ventricular diastolic function was indeterminate.  •  The left atrial cavity is moderately dilated.  •  Left atrial volume is moderately increased.  •  There is a bioprosthetic aortic valve present.  •  Estimated right ventricular systolic pressure from tricuspid regurgitation is moderately elevated (45-55 mmHg).      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  He was found to have rhabdomyolysis and acute renal failure.    Rhabdomyolysis:  Treat with IV fluid.  Nephrology consulted to assist.  Trend CK.  Supportive care and symptom treatment.    Acute renal failure with CKD 3B with mild metabolic acidosis:  Nephrology consulted.  IV  fluid.  Clinically appears dry and likely prerenal, possible ATN.  Hold nephrotoxins and renally dose medications.    Possible pneumonia:  Significantly elevated leukocytosis, right lower lobe infiltrate, occasional cough.  Suspect possible aspiration pneumonia while patient was down.  Allergy to Ceclor noted.  Patient initiated on vancomycin and Zosyn in emergency room.  Continue Zosyn and check MRSA nasal screen and hold further vancomycin for now unless positive    Clavicle fracture:  Orthopedic consult, suspect will be nonoperative.  It appears there is a typo on x-ray of the shoulder above as radiologist reports clavicle displacement but reports radius fracture.  On my review it appears that the clavicle is fractured.  Plan to reach out to radiologist to clarify.    Fall and physical debility and Charcot foot deformity: PT OT evaluation.  Fall prevention.    Type 2 diabetes: Monitor blood sugar and adjust insulin as needed.  Tradjenta    Atrial fibrillation: Eliquis anticoagulation.  Continue beta-blocker.  Continue home amiodarone.    Hyperlipidemia: Continue statin.  Stable.    Treatment plan discussed with patient who is in agreement.    DVT prophylaxis: Anticoagulant    Treatment plan discussed with patient is in agreement    CODE STATUS:    Code Status and Medical Interventions: CPR (Attempt to Resuscitate); Full Support   Ordered at: 03/03/25 9493     Level Of Support Discussed With:    Patient     Code Status (Patient has no pulse and is not breathing):    CPR (Attempt to Resuscitate)     Medical Interventions (Patient has pulse or is breathing):    Full Support         Bishop Chakraborty MD  03/03/25

## 2025-03-04 NOTE — PROGRESS NOTES
[facility] Medicine Services  PROGRESS NOTE    Patient Name: Rock Maciel Jr.  : 1945  MRN: [id number]    Date of Admission: 3/3/2025  Primary Care Physician: Denise Dickson APRN    Subjective   Subjective     CC:  Follow-up for recent fall    Subjective:  Shoulder pain is improving.  Patient is feeling much better today did not plaints.  He still feels some generalized weakness.  Patient hopes to return home and does not go to facility.  Patient reports decreased urine output from typical baseline.  We discussed persistent KILLIAN    Review of Systems    No current fevers or chills  No current nausea, vomiting, or diarrhea  No current chest pain or palpitations     Objective   Objective     Vital Signs:   Temp:  [98.2 °F (36.8 °C)-99.3 °F (37.4 °C)] 98.4 °F (36.9 °C)  Heart Rate:  [76-97] 76  Resp:  [18] 18  BP: (102-129)/(50-61) 129/58        Physical Exam:    Constitutional: Awake, alert, elderly and chronically ill-appearing  HENT: Moist membranes moist, neck supple  Respiratory: No cough or wheezes, normal respirations, nonlabored breathing   Cardiovascular: Pulse rate is normal, palpable radial pulses  Gastrointestinal:  Soft, nontender, nondistended  Musculoskeletal: Significantly obese BMI is 36, mild lower extremity edema, physically debilitated in appearance, frail  Psychiatric: Appropriate affect, cooperative, conversational  Neurologic: No slurred speech or facial droop, follows commands  Skin: Some bruising noted, no rashes or jaundice, warm          Results Reviewed:  Results from last 7 days   Lab Units 25  0446 25  1408   WBC 10*3/mm3 14.09* 24.99*   HEMOGLOBIN g/dL 7.0* 9.5*   HEMATOCRIT % 21.0* 29.4*   PLATELETS 10*3/mm3 185 253   INR   --  2.00*   PROCALCITONIN ng/mL  --  4.80*     Results from last 7 days   Lab Units 25  0446 25  1408   SODIUM mmol/L 140 136   POTASSIUM mmol/L 4.2 4.9   CHLORIDE mmol/L 106 100   CO2 mmol/L 21.0* 16.0*   BUN mg/dL 69* 64*    CREATININE mg/dL 4.06* 4.22*   GLUCOSE mg/dL 128* 197*   CALCIUM mg/dL 7.9* 8.8   ALK PHOS U/L 94 126*   ALT (SGPT) U/L 30 29   AST (SGOT) U/L 79* 97*     Estimated Creatinine Clearance: 20.3 mL/min (A) (by C-G formula based on SCr of 4.06 mg/dL (H)).    Microbiology Results Abnormal       None            Imaging Results (Last 24 Hours)       Procedure Component Value Units Date/Time    XR Shoulder 2+ View Left [[id number]] Collected: 03/03/25 1523     Updated: 03/04/25 0719    Narrative:      XR SHOULDER 2+ VW LEFT-, XR HUMERUS LEFT-     HISTORY: 79-year-old male status post shoulder pain following a fall.     FINDINGS:  There is a comminuted fracture of the distal clavicle with 1.5 cm caudal  displacement of the clavicular shaft. 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 There is significant narrowing of the subacromial space.  There is no shoulder dislocation and the left humerus appears  unremarkable.     This report was finalized on 3/4/2025 7:15 AM by Dr. Latrice Rodriguez M.D on  Workstation: [id number]       XR Humerus Left [[id number]] Collected: 03/03/25 1523     Updated: 03/04/25 0719    Narrative:      XR SHOULDER 2+ VW LEFT-, XR HUMERUS LEFT-     HISTORY: 79-year-old male status post shoulder pain following a fall.     FINDINGS:  There is a comminuted fracture of the distal clavicle with 1.5 cm caudal  displacement of the clavicular shaft. 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 There is significant narrowing of the subacromial space.  There is no shoulder dislocation and the left humerus appears  unremarkable.     This report was finalized on 3/4/2025 7:15 AM by Dr. Latrice Rodriguez M.D on  Workstation: [id number]               Results for orders placed during the hospital encounter of 10/03/24    Adult Transthoracic Echo Complete W/ Cont if Necessary Per  Protocol    Interpretation Summary    Left ventricular ejection fraction appears to be 61 - 65%.    Left ventricular diastolic function was indeterminate.    The left atrial cavity is moderately dilated.    Left atrial volume is moderately increased.    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    Estimated right ventricular systolic pressure from tricuspid regurgitation is moderately elevated (45-55 mmHg).      I have reviewed the medications:  Scheduled Meds:amiodarone, 200 mg, Oral, Daily  apixaban, 2.5 mg, Oral, BID  vitamin C, 250 mg, Oral, Daily  atorvastatin, 20 mg, Oral, Nightly  cholecalciferol, 1,000 Units, Oral, Daily  [Held by provider] clopidogrel, 75 mg, Oral, Daily  [Held by provider] hydrALAZINE, 25 mg, Oral, TID  insulin lispro, 2-9 Units, Subcutaneous, 4x Daily AC & at Bedtime  linagliptin, 5 mg, Oral, Daily  melatonin, 2.5 mg, Oral, Nightly  metoprolol succinate XL, 25 mg, Oral, Daily  piperacillin-tazobactam, 3.375 g, Intravenous, Q8H  sodium chloride, 3 mL, Intravenous, Q12H      Continuous Infusions:sodium chloride 0.45 % 925 mL with sodium bicarbonate 8.4 % 75 mEq infusion, , Last Rate: 100 mL/hr at 03/03/25 2333      PRN Meds:.  acetaminophen **OR** acetaminophen **OR** acetaminophen    senna-docusate sodium **AND** polyethylene glycol **AND** bisacodyl **AND** bisacodyl    dextrose    dextrose    famotidine    glucagon (human recombinant)    HYDROcodone-acetaminophen    Morphine **AND** naloxone    nitroglycerin    ondansetron ODT **OR** ondansetron    sodium chloride    sodium chloride    Assessment & Plan   Assessment & Plan     Active Hospital Problems    Diagnosis  POA    **Traumatic rhabdomyolysis [T79.6XXA]  Yes    Closed displaced fracture of left clavicle [S42.002A]  Yes    Pneumonia due to infectious organism [J18.9]  Yes    Persistent atrial fibrillation [I48.19]  Yes    Chronic anticoagulation [Z79.01]  Not Applicable    Stage 3b chronic kidney disease [N18.32]  Yes     Chronic diastolic (congestive) heart failure [I50.32]  Yes    KILLIAN (acute kidney injury) [N17.9]  Yes    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        Brief Hospital Course to date:  Rock Maciel Jr. is a 79 y.o. male   presents the hospital after fall at home while using his stair chair lift with closed head injury and injury to the left shoulder causing left clavicle fracture.  He was found to have rhabdomyolysis and acute renal failure.     Discussion/plan for today: Rhabdomyolysis improving.  Pulmonary without are related.  Suspect ATN.  Leukocytosis improving with treatment of antibiotics.  Awaiting orthopedic surgery evaluation for clavicle fracture.  Continue fall prevention and PT.  Glucose reviewed and insulin adjusted    Rhabdomyolysis:  Treat with IV fluid.  Nephrology consulted to assist.  Trend CK.  Supportive care and symptom treatment.     Acute renal failure with CKD 3B with mild metabolic acidosis:  Nephrology consulted.  IV fluid.  Clinically appears dry and likely prerenal, and probable ATN.  Hold nephrotoxins and renally dose medications.     Possible pneumonia:  Significantly elevated leukocytosis, right lower lobe infiltrate, occasional cough.  Suspect possible aspiration pneumonia while patient was down.  Allergy to Ceclor noted.  Patient initiated on vancomycin and Zosyn in emergency room.  Continue Zosyn and check MRSA nasal screen was negative.  No further vancomycin     Clavicle fracture:  Orthopedic consult, suspect will be nonoperative.  On x-ray images reviewed and comminuted distal clavicle fracture with  displacement of the clavicle shaft     Fall and physical debility and Charcot foot deformity: PT OT evaluation.  Fall prevention.     Type 2 diabetes: Monitor blood sugar and adjust insulin as needed.  Tradjenta     Atrial fibrillation: Eliquis anticoagulation.  Continue beta-blocker.  Continue home amiodarone.     Hyperlipidemia: Continue statin.  Stable.     Treatment plan discussed with patient who is in agreement.     DVT prophylaxis: Anticoagulant            Disposition: Patient would like to return home with assistance from a friend when possible    CODE STATUS:   Code Status and Medical Interventions: CPR (Attempt to Resuscitate); Full Support   Ordered at: 03/03/25 5646     Level Of Support Discussed With:    Patient     Code Status (Patient has no pulse and is not breathing):    CPR (Attempt to Resuscitate)     Medical Interventions (Patient has pulse or is breathing):    Full Support       Bishop Chakraborty MD  03/04/25

## 2025-03-04 NOTE — CASE MANAGEMENT/SOCIAL WORK
Discharge Planning Assessment  [facility]     Patient Name: Rock Maciel Jr.  MRN: [id number]  Today's Date: 3/4/2025    Admit Date: 3/3/2025    Plan: Home, friend to transport.   Discharge Needs Assessment       Row Name 03/04/25 1416       Living Environment    People in Home alone    Current Living Arrangements home    Quality of Family Relationships involved;helpful;supportive    Able to Return to Prior Arrangements other (see comments)       Transition Planning    Patient/Family Anticipates Transition to home with family    Patient/Family Anticipated Services at Transition     Transportation Anticipated car, drives self;family or friend will provide       Discharge Needs Assessment    Readmission Within the Last 30 Days no previous admission in last 30 days    Equipment Currently Used at Home grab bar;shower chair;walker, rolling;other (see comments);cpap    Concerns to be Addressed discharge planning    Outpatient/Agency/Support Group Needs homecare agency;skilled nursing facility    Discharge Facility/Level of Care Needs home with home health;nursing facility, skilled                   Discharge Plan       Row Name 03/04/25 1411       Plan    Plan Home, friend to transport.    Patient/Family in Agreement with Plan yes    Plan Comments CCP met with pt at the bedside, introduced self and role of CCP. Face sheet information and pharmacy verified. Pt lives alone in a 2-story home with a basement with 3STE. Pt has a chair lift to access the 2nd floor and basement. Pt still drives, IADL's and has grab bars, shower chair, rolling walker, chair lift and CPAP. Pt has a living will. Pt is enrolled in meds to beds and denies trouble affording or managing his medications. Pt has used [facility] in the past and has been to [facility] and [place] for SNF. DC plan is to return home, friend to transport. Leonardo AYON/CCP                  Continued Care and Services - Admitted Since 3/3/2025    No  active coordination exists for this encounter.          Demographic Summary       Row Name 03/04/25 1416       General Information    Admission Type inpatient    Arrived From home    Required Notices Provided Important Message from Medicare    Referral Source admission list;case finding    Reason for Consult discharge planning    Preferred Language English       Contact Information    Permission Granted to Share Info With ;family/designee                   Functional Status       Row Name 03/04/25 1416       Functional Status    Usual Activity Tolerance moderate    Current Activity Tolerance fair       Assessment of Health Literacy    How often do you have someone help you read hospital materials? Never    How often do you have problems learning about your medical condition because of difficulty understanding written information? Never    How often do you have a problem understanding what is told to you about your medical condition? Never    How confident are you filling out medical forms by yourself? Quite a bit    Health Literacy Good       Functional Status, IADL    Medications independent    Meal Preparation independent    Housekeeping independent    Laundry independent    Shopping independent                               Pauly Martinez, RN

## 2025-03-04 NOTE — PLAN OF CARE
Goal Outcome Evaluation:  Plan of Care Reviewed With: patient           Outcome Evaluation: pt couldn't void, bladder scan showed 968 in and out cath got 1000 out. cont ivf, alert and oriented X4, stict intake and output per nephro, will cont to monitor                             Problem: Adult Inpatient Plan of Care  Goal: Plan of Care Review  Outcome: Progressing  Flowsheets (Taken 3/4/2025 1407)  Outcome Evaluation: pt couldn't void, bladder scan showed 968 in and out cath got 1000 out. cont ivf, alert and oriented X4, stict intake and output per nephro, will cont to monitor  Plan of Care Reviewed With: patient  Goal: Patient-Specific Goal (Individualized)  Outcome: Progressing  Goal: Absence of Hospital-Acquired Illness or Injury  Outcome: Progressing  Intervention: Identify and Manage Fall Risk  Recent Flowsheet Documentation  Taken 3/4/2025 1403 by Ana Tilley RN  Safety Promotion/Fall Prevention:   activity supervised   assistive device/personal items within reach   clutter free environment maintained   fall prevention program maintained   nonskid shoes/slippers when out of bed   room organization consistent   safety round/check completed  Taken 3/4/2025 1200 by Ana Tilley, RN  Safety Promotion/Fall Prevention:   activity supervised   assistive device/personal items within reach   clutter free environment maintained   fall prevention program maintained   nonskid shoes/slippers when out of bed   room organization consistent   safety round/check completed  Taken 3/4/2025 1022 by Ana Tilley, RN  Safety Promotion/Fall Prevention:   activity supervised   assistive device/personal items within reach   clutter free environment maintained   fall prevention program maintained   nonskid shoes/slippers when out of bed   room organization consistent   safety round/check completed  Taken 3/4/2025 0902 by Ana Tilley, RN  Safety Promotion/Fall Prevention:   activity supervised   assistive  device/personal items within reach   clutter free environment maintained   fall prevention program maintained   nonskid shoes/slippers when out of bed   room organization consistent   safety round/check completed  Intervention: Prevent and Manage VTE (Venous Thromboembolism) Risk  Recent Flowsheet Documentation  Taken 3/4/2025 0902 by Ana Tilley RN  VTE Prevention/Management: (eliquis) --  Intervention: Prevent Infection  Recent Flowsheet Documentation  Taken 3/4/2025 1403 by Ana Tilley RN  Infection Prevention: single patient room provided  Taken 3/4/2025 1200 by Ana Tilley RN  Infection Prevention: single patient room provided  Goal: Optimal Comfort and Wellbeing  Outcome: Progressing  Intervention: Provide Person-Centered Care  Recent Flowsheet Documentation  Taken 3/4/2025 1403 by Ana Tilley RN  Trust Relationship/Rapport:   care explained   thoughts/feelings acknowledged  Taken 3/4/2025 0902 by Ana Tilley RN  Trust Relationship/Rapport:   care explained   thoughts/feelings acknowledged  Goal: Readiness for Transition of Care  Outcome: Progressing

## 2025-03-04 NOTE — CONSULTS
Orthopaedic Surgery  Consult Note  John Mcgee MD    [phone]    HPI:  Patient is a 79 y.o.male who presents with left shoulder pain after a fall. 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 Patient evaluated in ED found to have left distal clavicle fracture. He lives alone uses chair lift at home. Pain is improved with immobilization, rest and pain medication. Pain worsened with movement. Denies previous shoulder surgery. Denies pain elsewhere in his body.     MEDICAL HISTORY  Past Medical History:   Diagnosis Date    Allergic rhinitis     Bronchitis     Coronary artery disease     Diabetes mellitus 2001    DM (diabetes mellitus)     Encounter for annual health examination 05/06/2014    Annual Health Assessment    Gout     Hiatal hernia     History of MRSA infection     RIGHT FOOT 2010    Hyperlipidemia     Hypertension     Murmur     Pneumothorax on right     Sleep apnea     pt wears CPAP at night    Wellness examination 06/24/2015    Annual Wellness Visit     Past Surgical History:   Procedure Laterality Date    ARTERY SURGERY Bilateral     carotid    CARDIAC CATHETERIZATION N/A 7/20/2018    Procedure: Left Heart Cath;  Surgeon: Jo Toney MD;  Location: [facility] CATH [facility]ASIVE LOC[facility];  Service: Cardiovascular    CARDIAC CATHETERIZATION N/A 7/20/2018    Procedure: Coronary angiography;  Surgeon: Jo Toney MD;  Location: [facility] CATH [facility]VASIVE LOCATION;  Service: Cardiovascular    CAROTID ENDARTERECTOMY Bilateral     COLONOSCOPY  2008    CORONARY ARTERY BYPASS GRAFT WITH AORTIC VALVE REPAIR/REPLACEMENT N/A 7/23/2018    Procedure: INTRAOPERATIVE ALEX, MIDLINE STERNOTOMY, CORONARY ARTERY BYPASS GRAFTING X 3 USING ENDOSCOPICALLY HARVESTED LEFT GREATER SAPHENOUS VEIN,  AORTIC VALVE REPLACEMENT USING 25MM LOPEZ II ULTRA PORCINE VALVE, PRP;  Surgeon: Phong LOPES  MD Taty;  Location: [facility] MAIN OR;  Service: Cardiothoracic    FOOT SURGERY Right 2010    5th digit removal    FOOT SURGERY Left 2011    1 digit removed    INCISION AND DRAINAGE LEG Right 3/28/2020    Procedure: DEBRIDMENT OF RIGHT CALF;  Surgeon: Ross Pineda MD;  Location: [facility] MAIN OR;  Service: Vascular;  Laterality: Right;    INGUINAL HERNIA REPAIR Left 11/29/2024    Procedure: INGUINAL HERNIA REPAIR LAPAROSCOPIC WITH DAVINCI ROBOT;  Surgeon: Phong Lazo MD;  Location: [facility] MAIN OR;  Service: Robotics - DaVinci;  Laterality: Left;    QUADRICEPS TENDON REPAIR Left 5/14/2019    Procedure: Left QUADRICEPS TENDON REPAIR;  Surgeon: Camilo Hunter MD;  Location: [facility] MAIN OR;  Service: Orthopedics    THORACENTESIS Right 11/21/2016    THORACOSCOPY Right 5/8/2017    Procedure: BRONCHOSCOPY, RIGHT VAT,  TOTAL DECORTICATION RIGHT LUNG, PLEURAL BX, PLACEMENT SUBPLEURAL PAIN CAATHETERS X2;  Surgeon: Donald Orlando III, MD;  Location: [facility] OR;  Service:     TONSILECTOMY, ADENOIDECTOMY, BILATERAL MYRINGOTOMY AND TUBES       Prior to Admission medications    Medication Sig Start Date End Date Taking? Authorizing Provider   Alcohol Swabs (Pharmacist Choice Alcohol) pads 1 each 2 (Two) Times a Day. 5/4/22   Melissa Roach MD   amLODIPine (NORVASC) 5 MG tablet Take 1 tablet by mouth Daily. 2/21/24   Shivam Smith MD   atorvastatin (LIPITOR) 10 MG tablet TAKE 1 TABLET EVERY DAY 9/4/24   Melissa Roach MD   Cholecalciferol (VITAMIN D3) 5000 UNITS tablet Take 1 tablet by mouth Daily.    Shivam Smith MD   DULoxetine (CYMBALTA) 60 MG capsule TAKE 1 CAPSULE EVERY DAY 1/15/24   Melissa Roach MD   EPINEPHrine (EPIPEN) 0.3 MG/0.3ML solution auto-injector injection 0.3 mL Daily As Needed. 2/12/16   Shivam Smith MD   glucose blood (Pharmacist Choice Autocode) test strip 1 each by Other route 2 (Two) Times a Day. Use as instructed 12/6/23   Melissa Roach MD   Immune Globulin,  "Human, 40 GM/400ML solution 40 g Every 30 (Thirty) Days. 9/3/15   Provider, MD Shivam   metoprolol succinate XL (TOPROL-XL) 25 MG 24 hr tablet TAKE 1 TABLET EVERY DAY 9/30/24   Melissa Roach MD   olmesartan (BENICAR) 40 MG tablet Take 1 tablet by mouth Daily. 9/23/24   Melissa Roach MD   omeprazole (priLOSEC) 40 MG capsule TAKE 1 CAPSULE EVERY DAY 9/16/24   Melissa Roach MD   Pharmacist Choice Lancets misc Use 1 each 2 (Two) Times a Day. 12/6/23   Melissa Roach MD   Tirzepatide (Mounjaro) 15 MG/0.5ML solution pen-injector pen Inject 0.5 mL under the skin into the appropriate area as directed 1 (One) Time Per Week. 8/29/24   Melissa Roach MD   traMADol (ULTRAM) 50 MG tablet TAKE ONE TABLET BY MOUTH EVERY 8 HOURS AS NEEDED FOR MODERATE PAIN 11/21/23   Melissa Roach MD   Xarelto 20 MG tablet Take 1 tablet by mouth Daily. 5/23/23   Andrade Mcgarry MD     Allergies   Allergen Reactions    Ceclor [Cefaclor] Rash     Rash in his 50s; he tolerated piperacillin-tazobactam in March 2020     Most Recent Immunizations   Administered Date(s) Administered    COVID-19 (PFIZER) Purple Cap Monovalent 10/19/2021    FLUAD TRI 65YR+ 10/11/2019    Fluad Quad 65+ 10/13/2020    Fluzone High-Dose 65+YRS 10/13/2020    Pneumococcal Conjugate 13-Valent (PCV13) 03/30/2018    Pneumococcal Polysaccharide (PPSV23) 10/11/2019    Pneumococcal, Unspecified 06/24/2015    Shingrix 12/14/2020    Tdap 06/24/2015     Social History     Tobacco Use    Smoking status: Never     Passive exposure: Never    Smokeless tobacco: Never   Substance Use Topics    Alcohol use: Yes     Comment: either one glass wine or one glass liquor per  day      Social History     Substance and Sexual Activity   Drug Use Never       VITALS: /58 (BP Location: Right arm, Patient Position: Lying)   Pulse 85   Temp 98.8 °F (37.1 °C) (Oral)   Resp 18   Ht 185.4 cm (73\")   Wt 127 kg (279 lb 15.8 oz)   SpO2 97%   BMI 36.94 kg/m²  Body mass index is 36.94 kg/m².    PHYSICAL EXAM: "   CONSTITUTIONAL: No acute distress  LUNGS: Equal chest rise, no shortness of air  CARDIOVASCULAR: palpable peripheral pulses  SKIN: no skin lesions in the area examined  LYMPH: no lymphadenopathy in the area examined  Musculoskeletal:   LUE  Skin intact with no tenting of the skin   arm resting in a position of comfort  AIN PIN ULNAR NERVE intact   Sensation is intact distally   Foot is wwp     Cellulitis present at bilateral lower extremities. TMA on the right. Sensation not intact to light touch difficulty with dense palpation. Wound about the third digit on the right foot mild drainage.     RADIOLOGY REVIEW:   Shoulder radiographs left reviewed with left distal clavicle fracture     US Renal Bilateral    Result Date: 3/3/2025  1. Unremarkable bilateral renal ultrasound. 2. Urinary bladder diverticulum.   This report was finalized on 3/3/2025 5:05 PM by Dr. Marquez Olmstead M.D on Workstation: [id number]      XR Chest 1 View    Result Date: 3/3/2025  1. Mild cardiomegaly. 2. Mild patchy atelectasis or pneumonia in the right lung base.   This report was finalized on 3/3/2025 4:55 PM by Dr. Marquez Olmstead M.D on Workstation: [id number]      CT Head Without Contrast    Result Date: 3/3/2025   No acute intracranial hemorrhage or hydrocephalus. If there is further clinical concern, MRI could be considered for further evaluation.  This report was finalized on 3/3/2025 4:00 PM by Dr. Azam Alaniz M.D on Workstation: [id number]       LABS:   Results for the past 24 hours:   Recent Results (from the past 24 hours)   Comprehensive Metabolic Panel    Collection Time: 03/03/25  2:08 PM    Specimen: Blood   Result Value Ref Range    Glucose 197 (H) 65 - 99 mg/dL    BUN 64 (H) 8 - 23 mg/dL    Creatinine 4.22 (H) 0.76 - 1.27 mg/dL    Sodium 136 136 - 145 mmol/L    Potassium 4.9 3.5 - 5.2 mmol/L    Chloride 100 98 - 107 mmol/L    CO2 16.0 (L) 22.0 - 29.0 mmol/L    Calcium 8.8 8.6 - 10.5 mg/dL    Total Protein 7.2  6.0 - 8.5 g/dL    Albumin 3.4 (L) 3.5 - 5.2 g/dL    ALT (SGPT) 29 1 - 41 U/L    AST (SGOT) 97 (H) 1 - 40 U/L    Alkaline Phosphatase 126 (H) 39 - 117 U/L    Total Bilirubin 0.7 0.0 - 1.2 mg/dL    Globulin 3.8 gm/dL    A/G Ratio 0.9 g/dL    BUN/Creatinine Ratio 15.2 7.0 - 25.0    Anion Gap 20.0 (H) 5.0 - 15.0 mmol/L    eGFR 13.5 (L) >60.0 mL/min/1.73   CK    Collection Time: 03/03/25  2:08 PM    Specimen: Blood   Result Value Ref Range    Creatine Kinase 5,033 (H) 20 - 200 U/L   CBC Auto Differential    Collection Time: 03/03/25  2:08 PM    Specimen: Blood   Result Value Ref Range    WBC 24.99 (H) 3.40 - 10.80 10*3/mm3    RBC 3.21 (L) 4.14 - 5.80 10*6/mm3    Hemoglobin 9.5 (L) 13.0 - 17.7 g/dL    Hematocrit 29.4 (L) 37.5 - 51.0 %    MCV 91.6 79.0 - 97.0 fL    MCH 29.6 26.6 - 33.0 pg    MCHC 32.3 31.5 - 35.7 g/dL    RDW 14.2 12.3 - 15.4 %    RDW-SD 47.5 37.0 - 54.0 fl    MPV 10.7 6.0 - 12.0 fL    Platelets 253 140 - 450 10*3/mm3    nRBC 0.0 0.0 - 0.2 /100 WBC   Green Top (Gel)    Collection Time: 03/03/25  2:08 PM   Result Value Ref Range    Extra Tube Hold for add-ons.    Lavender Top    Collection Time: 03/03/25  2:08 PM   Result Value Ref Range    Extra Tube hold for add-on    Light Blue Top    Collection Time: 03/03/25  2:08 PM   Result Value Ref Range    Extra Tube Hold for add-ons.    Protime-INR    Collection Time: 03/03/25  2:08 PM    Specimen: Blood   Result Value Ref Range    Protime 22.8 (H) 11.7 - 14.2 Seconds    INR 2.00 (H) 0.90 - 1.10   aPTT    Collection Time: 03/03/25  2:08 PM    Specimen: Blood   Result Value Ref Range    PTT 33.0 22.7 - 35.4 seconds   Manual Differential    Collection Time: 03/03/25  2:08 PM    Specimen: Blood   Result Value Ref Range    Neutrophil % 89.8 (H) 42.7 - 76.0 %    Lymphocyte % 1.0 (L) 19.6 - 45.3 %    Monocyte % 6.1 5.0 - 12.0 %    Metamyelocyte % 2.0 (H) 0.0 - 0.0 %    Myelocyte % 1.0 (H) 0.0 - 0.0 %    Neutrophils Absolute 22.44 (H) 1.70 - 7.00 10*3/mm3    Lymphocytes  Absolute 0.25 (L) 0.70 - 3.10 10*3/mm3    Monocytes Absolute 1.52 (H) 0.10 - 0.90 10*3/mm3    Elliptocytes Slight/1+ None Seen    Ovalocytes Slight/1+ None Seen    Poikilocytes Slight/1+ None Seen    WBC Morphology Normal Normal    Platelet Morphology Normal Normal   Procalcitonin    Collection Time: 03/03/25  2:08 PM    Specimen: Blood   Result Value Ref Range    Procalcitonin 4.80 (H) 0.00 - 0.25 ng/mL   Hemoglobin A1c    Collection Time: 03/03/25  2:08 PM    Specimen: Blood   Result Value Ref Range    Hemoglobin A1C 6.00 (H) 4.80 - 5.60 %   Lactic Acid, Plasma    Collection Time: 03/03/25  5:47 PM    Specimen: Arm, Left; Blood   Result Value Ref Range    Lactate 1.6 0.5 - 2.0 mmol/L   Gold Top - SST    Collection Time: 03/03/25  6:03 PM   Result Value Ref Range    Extra Tube Hold for add-ons.    CK    Collection Time: 03/03/25  6:03 PM    Specimen: Blood   Result Value Ref Range    Creatine Kinase 4,441 (H) 20 - 200 U/L   POC Glucose Once    Collection Time: 03/03/25  8:46 PM    Specimen: Blood   Result Value Ref Range    Glucose 207 (H) 70 - 130 mg/dL   MRSA Screen, PCR (Inpatient) - Swab, Nares    Collection Time: 03/03/25 10:20 PM    Specimen: Nares; Swab   Result Value Ref Range    MRSA PCR No MRSA Detected No MRSA Detected   ECG 12 Lead Rhythm Change    Collection Time: 03/03/25 11:21 PM   Result Value Ref Range    QT Interval 421 ms    QTC Interval 503 ms   ECG 12 Lead Rhythm Change    Collection Time: 03/03/25 11:21 PM   Result Value Ref Range    QT Interval 420 ms    QTC Interval 502 ms   CK    Collection Time: 03/04/25  4:46 AM    Specimen: Blood   Result Value Ref Range    Creatine Kinase 3,020 (H) 20 - 200 U/L   CBC (No Diff)    Collection Time: 03/04/25  4:46 AM    Specimen: Blood   Result Value Ref Range    WBC 14.09 (H) 3.40 - 10.80 10*3/mm3    RBC 2.39 (L) 4.14 - 5.80 10*6/mm3    Hemoglobin 7.0 (L) 13.0 - 17.7 g/dL    Hematocrit 21.0 (L) 37.5 - 51.0 %    MCV 87.9 79.0 - 97.0 fL    MCH 29.3 26.6 -  33.0 pg    MCHC 33.3 31.5 - 35.7 g/dL    RDW 15.3 12.3 - 15.4 %    RDW-SD 49.5 37.0 - 54.0 fl    MPV 10.6 6.0 - 12.0 fL    Platelets 185 140 - 450 10*3/mm3   Comprehensive Metabolic Panel    Collection Time: 03/04/25  4:46 AM    Specimen: Blood   Result Value Ref Range    Glucose 128 (H) 65 - 99 mg/dL    BUN 69 (H) 8 - 23 mg/dL    Creatinine 4.06 (H) 0.76 - 1.27 mg/dL    Sodium 140 136 - 145 mmol/L    Potassium 4.2 3.5 - 5.2 mmol/L    Chloride 106 98 - 107 mmol/L    CO2 21.0 (L) 22.0 - 29.0 mmol/L    Calcium 7.9 (L) 8.6 - 10.5 mg/dL    Total Protein 5.6 (L) 6.0 - 8.5 g/dL    Albumin 2.6 (L) 3.5 - 5.2 g/dL    ALT (SGPT) 30 1 - 41 U/L    AST (SGOT) 79 (H) 1 - 40 U/L    Alkaline Phosphatase 94 39 - 117 U/L    Total Bilirubin 0.5 0.0 - 1.2 mg/dL    Globulin 3.0 gm/dL    A/G Ratio 0.9 g/dL    BUN/Creatinine Ratio 17.0 7.0 - 25.0    Anion Gap 13.0 5.0 - 15.0 mmol/L    eGFR 14.2 (L) >60.0 mL/min/1.73   POC Glucose Once    Collection Time: 03/04/25  6:25 AM    Specimen: Blood   Result Value Ref Range    Glucose 155 (H) 70 - 130 mg/dL       IMPRESSION:  Patient is a 79 y.o. male with left distal clavicle fracture, bilateral lower extremity cellulitis, charcot foot on right with wound at distal tip of 3rd digit.     PLAN:   Admited to: MD LORNA Renteria. If he is comfortable with ambulation it is okay to use walker to stabilize if he isnt pushing off  Discussed with the patient his diagnosis and treatment plan regarding his left clavicle fracture. I discussed with him continued conservative treatment with follow up with either myself or Dr. Hunter whom the patient has requested to see and can see him on an outpatient basis  Avoid forward flexion until further signs of consolidation/healing on radiographs   Sling as tolerated   PT   Ice   Pain control   Recommend podiatry consult for right 3rd toe wound , wound care etc.   Cellulitis treatment per hospitalist  please call/chat  with any questions or  concerns.    John Mcgee MD   [facility]   [phone] - [place] [facility]  [phone] - [facility]

## 2025-03-05 PROBLEM — N17.0 ATN (ACUTE TUBULAR NECROSIS): Status: ACTIVE | Noted: 2025-03-05

## 2025-03-05 LAB
ABO GROUP BLD: NORMAL
ALBUMIN SERPL-MCNC: 2.4 G/DL (ref 3.5–5.2)
ALBUMIN UR-MCNC: 9.5 MG/DL
ALBUMIN/GLOB SERPL: 0.8 G/DL
ALP SERPL-CCNC: 163 U/L (ref 39–117)
ALT SERPL W P-5'-P-CCNC: 53 U/L (ref 1–41)
ANION GAP SERPL CALCULATED.3IONS-SCNC: 12.1 MMOL/L (ref 5–15)
AST SERPL-CCNC: 82 U/L (ref 1–40)
BACTERIA UR QL AUTO: NORMAL /HPF
BILIRUB SERPL-MCNC: 0.6 MG/DL (ref 0–1.2)
BILIRUB UR QL STRIP: NEGATIVE
BLD GP AB SCN SERPL QL: NEGATIVE
BUN SERPL-MCNC: 71 MG/DL (ref 8–23)
BUN/CREAT SERPL: 18.2 (ref 7–25)
CALCIUM SPEC-SCNC: 7.8 MG/DL (ref 8.6–10.5)
CHLORIDE SERPL-SCNC: 101 MMOL/L (ref 98–107)
CK SERPL-CCNC: 1005 U/L (ref 20–200)
CLARITY UR: ABNORMAL
CO2 SERPL-SCNC: 24.9 MMOL/L (ref 22–29)
COLOR UR: YELLOW
CREAT SERPL-MCNC: 3.91 MG/DL (ref 0.76–1.27)
CREAT UR-MCNC: 64.8 MG/DL
CREAT UR-MCNC: 64.8 MG/DL
DEPRECATED RDW RBC AUTO: 47.1 FL (ref 37–54)
EGFRCR SERPLBLD CKD-EPI 2021: 14.8 ML/MIN/1.73
ERYTHROCYTE [DISTWIDTH] IN BLOOD BY AUTOMATED COUNT: 14.8 % (ref 12.3–15.4)
FERRITIN SERPL-MCNC: 550 NG/ML (ref 30–400)
GLOBULIN UR ELPH-MCNC: 3.1 GM/DL
GLUCOSE BLDC GLUCOMTR-MCNC: 150 MG/DL (ref 70–130)
GLUCOSE BLDC GLUCOMTR-MCNC: 208 MG/DL (ref 70–130)
GLUCOSE BLDC GLUCOMTR-MCNC: 217 MG/DL (ref 70–130)
GLUCOSE BLDC GLUCOMTR-MCNC: 277 MG/DL (ref 70–130)
GLUCOSE SERPL-MCNC: 126 MG/DL (ref 65–99)
GLUCOSE UR STRIP-MCNC: NEGATIVE MG/DL
HCT VFR BLD AUTO: 18.9 % (ref 37.5–51)
HGB BLD-MCNC: 6.2 G/DL (ref 13–17.7)
HGB UR QL STRIP.AUTO: ABNORMAL
HYALINE CASTS UR QL AUTO: NORMAL /LPF
IRON 24H UR-MRATE: 19 MCG/DL (ref 59–158)
IRON SATN MFR SERPL: 11 % (ref 20–50)
KETONES UR QL STRIP: NEGATIVE
LEUKOCYTE ESTERASE UR QL STRIP.AUTO: NEGATIVE
MCH RBC QN AUTO: 29 PG (ref 26.6–33)
MCHC RBC AUTO-ENTMCNC: 32.8 G/DL (ref 31.5–35.7)
MCV RBC AUTO: 88.3 FL (ref 79–97)
MICROALBUMIN/CREAT UR: 146.6 MG/G (ref 0–29)
NITRITE UR QL STRIP: NEGATIVE
PH UR STRIP.AUTO: 5.5 [PH] (ref 5–8)
PLATELET # BLD AUTO: 182 10*3/MM3 (ref 140–450)
PMV BLD AUTO: 10.5 FL (ref 6–12)
POTASSIUM SERPL-SCNC: 3.9 MMOL/L (ref 3.5–5.2)
PROT ?TM UR-MCNC: 40.3 MG/DL
PROT SERPL-MCNC: 5.5 G/DL (ref 6–8.5)
PROT UR QL STRIP: ABNORMAL
PROT/CREAT UR: 621.9 MG/G CREA (ref 0–200)
QT INTERVAL: 420 MS
QT INTERVAL: 421 MS
QTC INTERVAL: 502 MS
QTC INTERVAL: 503 MS
RBC # BLD AUTO: 2.14 10*6/MM3 (ref 4.14–5.8)
RBC # UR STRIP: NORMAL /HPF
REF LAB TEST METHOD: NORMAL
RETICS # AUTO: 0.03 10*6/MM3 (ref 0.02–0.13)
RETICS/RBC NFR AUTO: 1.48 % (ref 0.7–1.9)
RH BLD: POSITIVE
SODIUM SERPL-SCNC: 138 MMOL/L (ref 136–145)
SP GR UR STRIP: 1.01 (ref 1–1.03)
SQUAMOUS #/AREA URNS HPF: NORMAL /HPF
T&S EXPIRATION DATE: NORMAL
TIBC SERPL-MCNC: 173 MCG/DL (ref 298–536)
TRANSFERRIN SERPL-MCNC: 116 MG/DL (ref 200–360)
UROBILINOGEN UR QL STRIP: ABNORMAL
WBC # UR STRIP: NORMAL /HPF
WBC NRBC COR # BLD AUTO: 14.98 10*3/MM3 (ref 3.4–10.8)

## 2025-03-05 PROCEDURE — 82550 ASSAY OF CK (CPK): CPT | Performed by: STUDENT IN AN ORGANIZED HEALTH CARE EDUCATION/TRAINING PROGRAM

## 2025-03-05 PROCEDURE — 86900 BLOOD TYPING SEROLOGIC ABO: CPT | Performed by: INTERNAL MEDICINE

## 2025-03-05 PROCEDURE — 25010000002 PIPERACILLIN SOD-TAZOBACTAM PER 1 G: Performed by: INTERNAL MEDICINE

## 2025-03-05 PROCEDURE — 63710000001 INSULIN LISPRO (HUMAN) PER 5 UNITS: Performed by: INTERNAL MEDICINE

## 2025-03-05 PROCEDURE — 36430 TRANSFUSION BLD/BLD COMPNT: CPT

## 2025-03-05 PROCEDURE — 82570 ASSAY OF URINE CREATININE: CPT | Performed by: STUDENT IN AN ORGANIZED HEALTH CARE EDUCATION/TRAINING PROGRAM

## 2025-03-05 PROCEDURE — 82728 ASSAY OF FERRITIN: CPT | Performed by: INTERNAL MEDICINE

## 2025-03-05 PROCEDURE — 86850 RBC ANTIBODY SCREEN: CPT | Performed by: INTERNAL MEDICINE

## 2025-03-05 PROCEDURE — 85045 AUTOMATED RETICULOCYTE COUNT: CPT | Performed by: INTERNAL MEDICINE

## 2025-03-05 PROCEDURE — 84156 ASSAY OF PROTEIN URINE: CPT | Performed by: STUDENT IN AN ORGANIZED HEALTH CARE EDUCATION/TRAINING PROGRAM

## 2025-03-05 PROCEDURE — 25810000003 SODIUM CHLORIDE 0.9 % SOLUTION: Performed by: STUDENT IN AN ORGANIZED HEALTH CARE EDUCATION/TRAINING PROGRAM

## 2025-03-05 PROCEDURE — 85027 COMPLETE CBC AUTOMATED: CPT | Performed by: INTERNAL MEDICINE

## 2025-03-05 PROCEDURE — 80053 COMPREHEN METABOLIC PANEL: CPT | Performed by: INTERNAL MEDICINE

## 2025-03-05 PROCEDURE — 81001 URINALYSIS AUTO W/SCOPE: CPT | Performed by: EMERGENCY MEDICINE

## 2025-03-05 PROCEDURE — 25010000002 NA FERRIC GLUC CPLX PER 12.5 MG: Performed by: STUDENT IN AN ORGANIZED HEALTH CARE EDUCATION/TRAINING PROGRAM

## 2025-03-05 PROCEDURE — 36415 COLL VENOUS BLD VENIPUNCTURE: CPT | Performed by: INTERNAL MEDICINE

## 2025-03-05 PROCEDURE — 83540 ASSAY OF IRON: CPT | Performed by: INTERNAL MEDICINE

## 2025-03-05 PROCEDURE — 86901 BLOOD TYPING SEROLOGIC RH(D): CPT | Performed by: INTERNAL MEDICINE

## 2025-03-05 PROCEDURE — 84466 ASSAY OF TRANSFERRIN: CPT | Performed by: INTERNAL MEDICINE

## 2025-03-05 PROCEDURE — 86923 COMPATIBILITY TEST ELECTRIC: CPT

## 2025-03-05 PROCEDURE — 82043 UR ALBUMIN QUANTITATIVE: CPT | Performed by: STUDENT IN AN ORGANIZED HEALTH CARE EDUCATION/TRAINING PROGRAM

## 2025-03-05 PROCEDURE — 82948 REAGENT STRIP/BLOOD GLUCOSE: CPT

## 2025-03-05 PROCEDURE — P9016 RBC LEUKOCYTES REDUCED: HCPCS

## 2025-03-05 PROCEDURE — 86900 BLOOD TYPING SEROLOGIC ABO: CPT

## 2025-03-05 RX ORDER — INSULIN LISPRO 100 [IU]/ML
3 INJECTION, SOLUTION INTRAVENOUS; SUBCUTANEOUS
Status: DISCONTINUED | OUTPATIENT
Start: 2025-03-05 | End: 2025-03-06

## 2025-03-05 RX ORDER — TAMSULOSIN HYDROCHLORIDE 0.4 MG/1
0.4 CAPSULE ORAL DAILY
Status: DISCONTINUED | OUTPATIENT
Start: 2025-03-05 | End: 2025-03-19 | Stop reason: HOSPADM

## 2025-03-05 RX ADMIN — TAMSULOSIN HYDROCHLORIDE 0.4 MG: 0.4 CAPSULE ORAL at 11:21

## 2025-03-05 RX ADMIN — Medication 3 ML: at 22:37

## 2025-03-05 RX ADMIN — PIPERACILLIN AND TAZOBACTAM 3.38 G: 3; .375 INJECTION, POWDER, FOR SOLUTION INTRAVENOUS at 23:48

## 2025-03-05 RX ADMIN — Medication 3 ML: at 09:05

## 2025-03-05 RX ADMIN — INSULIN LISPRO 2 UNITS: 100 INJECTION, SOLUTION INTRAVENOUS; SUBCUTANEOUS at 08:55

## 2025-03-05 RX ADMIN — INSULIN LISPRO 4 UNITS: 100 INJECTION, SOLUTION INTRAVENOUS; SUBCUTANEOUS at 17:02

## 2025-03-05 RX ADMIN — OXYCODONE HYDROCHLORIDE AND ACETAMINOPHEN 250 MG: 500 TABLET ORAL at 08:56

## 2025-03-05 RX ADMIN — LINAGLIPTIN 5 MG: 5 TABLET, FILM COATED ORAL at 08:55

## 2025-03-05 RX ADMIN — METOPROLOL SUCCINATE 25 MG: 25 TABLET, EXTENDED RELEASE ORAL at 08:56

## 2025-03-05 RX ADMIN — INSULIN LISPRO 6 UNITS: 100 INJECTION, SOLUTION INTRAVENOUS; SUBCUTANEOUS at 12:32

## 2025-03-05 RX ADMIN — Medication 1000 UNITS: at 08:56

## 2025-03-05 RX ADMIN — SODIUM BICARBONATE: 84 INJECTION, SOLUTION INTRAVENOUS at 08:41

## 2025-03-05 RX ADMIN — PIPERACILLIN AND TAZOBACTAM 3.38 G: 3; .375 INJECTION, POWDER, FOR SOLUTION INTRAVENOUS at 17:41

## 2025-03-05 RX ADMIN — INSULIN LISPRO 4 UNITS: 100 INJECTION, SOLUTION INTRAVENOUS; SUBCUTANEOUS at 21:52

## 2025-03-05 RX ADMIN — INSULIN LISPRO 3 UNITS: 100 INJECTION, SOLUTION INTRAVENOUS; SUBCUTANEOUS at 12:32

## 2025-03-05 RX ADMIN — Medication 2.5 MG: at 20:36

## 2025-03-05 RX ADMIN — INSULIN LISPRO 2 UNITS: 100 INJECTION, SOLUTION INTRAVENOUS; SUBCUTANEOUS at 08:53

## 2025-03-05 RX ADMIN — SODIUM CHLORIDE 250 MG: 9 INJECTION, SOLUTION INTRAVENOUS at 17:42

## 2025-03-05 RX ADMIN — INSULIN LISPRO 3 UNITS: 100 INJECTION, SOLUTION INTRAVENOUS; SUBCUTANEOUS at 17:02

## 2025-03-05 RX ADMIN — PIPERACILLIN AND TAZOBACTAM 3.38 G: 3; .375 INJECTION, POWDER, FOR SOLUTION INTRAVENOUS at 08:41

## 2025-03-05 RX ADMIN — ATORVASTATIN CALCIUM 20 MG: 20 TABLET, FILM COATED ORAL at 20:35

## 2025-03-05 RX ADMIN — AMIODARONE HYDROCHLORIDE 200 MG: 200 TABLET ORAL at 08:56

## 2025-03-05 RX ADMIN — APIXABAN 2.5 MG: 2.5 TABLET, FILM COATED ORAL at 20:36

## 2025-03-05 RX ADMIN — APIXABAN 2.5 MG: 2.5 TABLET, FILM COATED ORAL at 08:56

## 2025-03-05 NOTE — PLAN OF CARE
Goal Outcome Evaluation:  Plan of Care Reviewed With: patient        Progress: no change  Outcome Evaluation: Pt remains alert and oriented, V/S stable, on 2L N/C on awake, wearing home Cpap while sleeping, Pt unable to urinate on his own, bladder scan and in and out cath performed. Urine sample collected and sent to lab. Pt c/o of pain in his legs, legs wrapped with Ace wrapped and pt reported pain has improved. 0.45 NS w/bicarb continued at 100.

## 2025-03-05 NOTE — SIGNIFICANT NOTE
03/05/25 1507   OTHER   Discipline occupational therapist   Rehab Time/Intention   Session Not Performed other (see comments)  (Hgb 6.2, contraindication to therapeutic intervention. OT will follow up as able.)   Recommendation   OT - Next Appointment 03/06/25

## 2025-03-05 NOTE — CONSULTS
Sudha visited Pt who requested ashes for Trevon Wednesday. Sudha remains available for additional support at Pt's request.

## 2025-03-05 NOTE — PROGRESS NOTES
"      FOLLOW UP NOTE      Name: Rock Maciel Jr. ADMIT: 3/3/2025   : 1945  PCP: Denise Dickson APRN    MRN: [id number] LOS: 2 days   AGE/SEX: 79 y.o. male  ROOM: [facility]     Date of Service: 3/5/2025                           CHIEF COMPLIANTS / REASON FOR FOLLOW UP                Subjective:        Resting comfortably in bed.  Over 1 L urine output.  No dyspnea.  Leg swelling improved with wrapping feet.  No dyspnea or orthopnea.       Review of Systems:     Negative except as above       OBJECTIVE                                                                        Exam:  /51   Pulse 69   Temp 98.4 °F (36.9 °C)   Resp 18   Ht 185.4 cm (73\")   Wt 127 kg (279 lb 15.8 oz)   SpO2 99%   BMI 36.94 kg/m²   Intake/Output last 3 shifts:  I/O last 3 completed shifts:  In: 960 [P.O.:960]  Out: 1900 [Urine:1900]  Intake/Output this shift:  No intake/output data recorded.    General Appearance: Chronically ill, pale appearing      Lungs:   Clear to auscultation bilaterally, respirations unlabored  Heart:  Regular rate and rhythm, S1 and S2 normal, no  rub   or gallop  Abdomen:  Soft, nontender  Extremities: Extremities wrapped, trace ankle edema  Neurologic:   Alert and oriented    Scheduled Meds:amiodarone, 200 mg, Oral, Daily  apixaban, 2.5 mg, Oral, BID  vitamin C, 250 mg, Oral, Daily  atorvastatin, 20 mg, Oral, Nightly  cholecalciferol, 1,000 Units, Oral, Daily  [Held by provider] clopidogrel, 75 mg, Oral, Daily  [Held by provider] hydrALAZINE, 25 mg, Oral, TID  insulin lispro, 2-9 Units, Subcutaneous, 4x Daily AC & at Bedtime  insulin lispro, 3 Units, Subcutaneous, TID With Meals  linagliptin, 5 mg, Oral, Daily  melatonin, 2.5 mg, Oral, Nightly  metoprolol succinate XL, 25 mg, Oral, Daily  piperacillin-tazobactam, 3.375 g, Intravenous, Q8H  sodium chloride, 3 mL, Intravenous, Q12H  tamsulosin, 0.4 mg, Oral, Daily      Continuous Infusions:sodium chloride 0.45 % 925 mL with sodium bicarbonate " 8.4 % 75 mEq infusion, , Last Rate: 100 mL/hr at 03/05/25 0841      PRN Meds:  acetaminophen **OR** acetaminophen **OR** acetaminophen    senna-docusate sodium **AND** polyethylene glycol **AND** bisacodyl **AND** bisacodyl    dextrose    dextrose    famotidine    glucagon (human recombinant)    HYDROcodone-acetaminophen    Morphine **AND** naloxone    nitroglycerin    ondansetron ODT **OR** ondansetron    sodium chloride    sodium chloride         Data Review:                                                                           Labs reviewed        Imaging:                                                                           Radiology reviewed           ASSESSMENT:                                                                                Traumatic rhabdomyolysis    Arteriosclerosis of coronary artery    Essential hypertension    Type 2 diabetes mellitus with circulatory disorder, without long-term current use of insulin    Hyperlipidemia    Charcot-Davida-Tooth disease-like deformity of foot    Fall    S/P CABG x 2    KILLIAN (acute kidney injury)    Chronic diastolic (congestive) heart failure    Stage 3b chronic kidney disease    Chronic anticoagulation    Persistent atrial fibrillation    Closed displaced fracture of left clavicle    Pneumonia due to infectious organism         KILLIAN: likely ATN related to rhabdomyolysis, prerenal insult related to diuretics etc. improving with good urine output.  No overt uremic symptoms.  Metabolic acidosis: Resolved.    Rhabdomyolysis related to fall: Continuing to improve.  CKD stage III-IV: Baseline creatinine  1.8-2.6 mg/dL with baseline GFR around 25 mL/min  Clavicle fracture  A-fib with controlled rates.  History of HFpEF with pulmonary hypertension: Slightly volume expanded.  Severe anemia in CKD: TSAT 11%.  Cannot rule out occult GI bleed given anticoagulation.          PLAN:                                                                            Decrease  IVF rate and stop in 8 hours.  IV ferric gluconate 250 mg daily x 4 doses.  SHIRLEY when iron replete.  Agree with PRBCs.  May need GI consultation to rule out GI bleed given significant decrease in hemoglobin since admission and also as he is on anticoagulation.    D/W RNs.     Tiesha Mccrary MD  [facility]  3/5/2025  09:47 EST

## 2025-03-05 NOTE — PLAN OF CARE
Goal Outcome Evaluation:            Patient vitals stable today. Bladder scan performed, patient noted to be retaining urine. Provider notified and abdi catheter placed. Received 1 unit of blood this afternoon and Ferric Gluconate after blood administration completed. Currently on 1.5L oxygen via Nasal Cannula.   Problem: Adult Inpatient Plan of Care  Goal: Plan of Care Review  Outcome: Progressing  Goal: Patient-Specific Goal (Individualized)  Outcome: Progressing  Goal: Absence of Hospital-Acquired Illness or Injury  Outcome: Progressing  Intervention: Identify and Manage Fall Risk  Recent Flowsheet Documentation  Taken 3/5/2025 1813 by Ayanna Hui, RN  Safety Promotion/Fall Prevention:   safety round/check completed   room organization consistent   nonskid shoes/slippers when out of bed   lighting adjusted   fall prevention program maintained   clutter free environment maintained   assistive device/personal items within reach   activity supervised  Taken 3/5/2025 1655 by Ayanna Hui, RN  Safety Promotion/Fall Prevention:   safety round/check completed   room organization consistent   nonskid shoes/slippers when out of bed   lighting adjusted   fall prevention program maintained   clutter free environment maintained   assistive device/personal items within reach   activity supervised  Taken 3/5/2025 1440 by Ayanna Hui, RN  Safety Promotion/Fall Prevention:   safety round/check completed   room organization consistent   nonskid shoes/slippers when out of bed   mobility aid in reach   lighting adjusted   fall prevention program maintained   clutter free environment maintained   assistive device/personal items within reach   activity supervised  Taken 3/5/2025 1232 by Ayanna Hui, RN  Safety Promotion/Fall Prevention:   safety round/check completed   room organization consistent   nonskid shoes/slippers when out of bed   lighting adjusted   fall prevention program maintained    clutter free environment maintained   assistive device/personal items within reach   activity supervised  Taken 3/5/2025 1010 by Ayanna Hui RN  Safety Promotion/Fall Prevention:   safety round/check completed   room organization consistent   nonskid shoes/slippers when out of bed   lighting adjusted   fall prevention program maintained   clutter free environment maintained   assistive device/personal items within reach   activity supervised  Taken 3/5/2025 0850 by Ayanna Hui RN  Safety Promotion/Fall Prevention:   safety round/check completed   room organization consistent   nonskid shoes/slippers when out of bed   fall prevention program maintained   lighting adjusted   mobility aid in reach   clutter free environment maintained   assistive device/personal items within reach   activity supervised  Intervention: Prevent Skin Injury  Recent Flowsheet Documentation  Taken 3/5/2025 1813 by Ayanna Hui RN  Body Position: sitting up in bed  Taken 3/5/2025 1655 by Ayanna Hui RN  Body Position: sitting up in bed  Taken 3/5/2025 1440 by Ayanna Hui RN  Body Position:   sitting up in bed   supine  Taken 3/5/2025 1232 by Ayanna Hui RN  Body Position: sitting up in bed  Taken 3/5/2025 1010 by Ayanna Hui RN  Body Position: supine  Taken 3/5/2025 0850 by Ayanna Hui RN  Body Position: supine  Intervention: Prevent Infection  Recent Flowsheet Documentation  Taken 3/5/2025 1813 by Ayanna Hui RN  Infection Prevention:   single patient room provided   rest/sleep promoted   hand hygiene promoted  Taken 3/5/2025 1655 by Ayanna Hui RN  Infection Prevention:   single patient room provided   rest/sleep promoted   hand hygiene promoted  Taken 3/5/2025 1440 by Ayanna Hui RN  Infection Prevention:   single patient room provided   rest/sleep promoted   hand hygiene promoted  Taken 3/5/2025 1232 by Ayanna Hui  RN  Infection Prevention:   single patient room provided   rest/sleep promoted   hand hygiene promoted  Taken 3/5/2025 1010 by Ayanna Hui RN  Infection Prevention:   single patient room provided   rest/sleep promoted   hand hygiene promoted  Taken 3/5/2025 0850 by Ayanna Hui RN  Infection Prevention:   single patient room provided   rest/sleep promoted   hand hygiene promoted  Goal: Optimal Comfort and Wellbeing  Outcome: Progressing  Intervention: Provide Person-Centered Care  Recent Flowsheet Documentation  Taken 3/5/2025 0850 by Ayanna Hui RN  Trust Relationship/Rapport:   care explained   thoughts/feelings acknowledged   questions answered  Goal: Readiness for Transition of Care  Outcome: Progressing     Problem: Fall Injury Risk  Goal: Absence of Fall and Fall-Related Injury  Outcome: Progressing  Intervention: Identify and Manage Contributors  Recent Flowsheet Documentation  Taken 3/5/2025 0850 by Ayanna Hui RN  Medication Review/Management: medications reviewed  Intervention: Promote Injury-Free Environment  Recent Flowsheet Documentation  Taken 3/5/2025 1813 by Ayanna Hui RN  Safety Promotion/Fall Prevention:   safety round/check completed   room organization consistent   nonskid shoes/slippers when out of bed   lighting adjusted   fall prevention program maintained   clutter free environment maintained   assistive device/personal items within reach   activity supervised  Taken 3/5/2025 1655 by Ayanna Hui RN  Safety Promotion/Fall Prevention:   safety round/check completed   room organization consistent   nonskid shoes/slippers when out of bed   lighting adjusted   fall prevention program maintained   clutter free environment maintained   assistive device/personal items within reach   activity supervised  Taken 3/5/2025 1440 by Ayanna Hui RN  Safety Promotion/Fall Prevention:   safety round/check completed   room organization  consistent   nonskid shoes/slippers when out of bed   mobility aid in reach   lighting adjusted   fall prevention program maintained   clutter free environment maintained   assistive device/personal items within reach   activity supervised  Taken 3/5/2025 1232 by Ayanna Hui RN  Safety Promotion/Fall Prevention:   safety round/check completed   room organization consistent   nonskid shoes/slippers when out of bed   lighting adjusted   fall prevention program maintained   clutter free environment maintained   assistive device/personal items within reach   activity supervised  Taken 3/5/2025 1010 by Ayanna Hui RN  Safety Promotion/Fall Prevention:   safety round/check completed   room organization consistent   nonskid shoes/slippers when out of bed   lighting adjusted   fall prevention program maintained   clutter free environment maintained   assistive device/personal items within reach   activity supervised  Taken 3/5/2025 0850 by Ayanna Hui RN  Safety Promotion/Fall Prevention:   safety round/check completed   room organization consistent   nonskid shoes/slippers when out of bed   fall prevention program maintained   lighting adjusted   mobility aid in reach   clutter free environment maintained   assistive device/personal items within reach   activity supervised     Problem: Skin Injury Risk Increased  Goal: Skin Health and Integrity  Outcome: Progressing  Intervention: Optimize Skin Protection  Recent Flowsheet Documentation  Taken 3/5/2025 1813 by Ayanna Hui RN  Head of Bed (HOB) Positioning: HOB at 30-45 degrees  Taken 3/5/2025 1655 by Ayanna Hui RN  Head of Bed (HOB) Positioning: HOB at 30-45 degrees  Taken 3/5/2025 1440 by Ayanna Hui RN  Head of Bed (HOB) Positioning: HOB at 30 degrees  Taken 3/5/2025 1232 by Ayanna Hui RN  Head of Bed (HOB) Positioning: HOB at 45 degrees  Taken 3/5/2025 1010 by Ayanna Hui RN  Activity  Management: activity encouraged  Head of Bed (HOB) Positioning: HOB at 30 degrees  Taken 3/5/2025 0850 by Ayanna Hui, RN  Activity Management: activity encouraged  Head of Bed (HOB) Positioning: HOB at 30 degrees

## 2025-03-05 NOTE — PROGRESS NOTES
[facility]  PROGRESS NOTE    Patient Name: Rock Maciel Jr.  : 1945  MRN: [id number]    Date of Admission: 3/3/2025  Primary Care Physician: Denise Dickson APRN    Subjective   Subjective     CC:  Follow-up for recent fall    Subjective:  Patient wearing CPAP last night.  Required in and out catheterization.  Patient having difficulty getting urine out.  Pain reasonably controlled.  No reported bleeding.  No other new complaints.    Review of Systems    No current fevers or chills  No current nausea, vomiting, or diarrhea  No current chest pain or palpitations     Objective   Objective     Vital Signs:   Temp:  [98.4 °F (36.9 °C)-99.7 °F (37.6 °C)] 98.4 °F (36.9 °C)  Heart Rate:  [69-76] 69  Resp:  [18] 18  BP: (113-139)/(50-58) 120/51        Physical Exam:    Constitutional: Awake, alert, elderly and chronically ill-appearing  HENT: Moist membranes moist, neck supple  Respiratory: No cough or wheezes, normal respirations, nonlabored breathing   Cardiovascular: Pulse rate is normal, palpable radial pulses  Gastrointestinal:  Soft, nontender, nondistended  Musculoskeletal: Significantly obese BMI is 36, mild lower extremity edema, physically debilitated in appearance, frail  Psychiatric: Appropriate affect, cooperative, conversational  Neurologic: No slurred speech or facial droop, follows commands  Skin: Some bruising noted, no rashes or jaundice, warm          Results Reviewed:  Results from last 7 days   Lab Units 25  0604 25  1408   WBC 10*3/mm3 14.98* 14.09* 24.99*   HEMOGLOBIN g/dL 6.2* 7.0* 9.5*   HEMATOCRIT % 18.9* 21.0* 29.4*   PLATELETS 10*3/mm3 182 185 253   INR   --   --  2.00*   PROCALCITONIN ng/mL  --   --  4.80*     Results from last 7 days   Lab Units 25  0604 25  04425  1408   SODIUM mmol/L 138 140 136   POTASSIUM mmol/L 3.9 4.2 4.9   CHLORIDE mmol/L 101 106 100   CO2 mmol/L 24.9 21.0* 16.0*   BUN mg/dL 71* 69* 64*    CREATININE mg/dL 3.91* 4.06* 4.22*   GLUCOSE mg/dL 126* 128* 197*   CALCIUM mg/dL 7.8* 7.9* 8.8   ALK PHOS U/L 163* 94 126*   ALT (SGPT) U/L 53* 30 29   AST (SGOT) U/L 82* 79* 97*     Estimated Creatinine Clearance: 21 mL/min (A) (by C-G formula based on SCr of 3.91 mg/dL (H)).    Microbiology Results Abnormal       None            Imaging Results (Last 24 Hours)       ** No results found for the last 24 hours. **            Results for orders placed during the hospital encounter of 10/03/24    Adult Transthoracic Echo Complete W/ Cont if Necessary Per Protocol    Interpretation Summary    Left ventricular ejection fraction appears to be 61 - 65%.    Left ventricular diastolic function was indeterminate.    The left atrial cavity is moderately dilated.    Left atrial volume is moderately increased.    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    Estimated right ventricular systolic pressure from tricuspid regurgitation is moderately elevated (45-55 mmHg).      I have reviewed the medications:  Scheduled Meds:amiodarone, 200 mg, Oral, Daily  apixaban, 2.5 mg, Oral, BID  vitamin C, 250 mg, Oral, Daily  atorvastatin, 20 mg, Oral, Nightly  cholecalciferol, 1,000 Units, Oral, Daily  [Held by provider] clopidogrel, 75 mg, Oral, Daily  [Held by provider] hydrALAZINE, 25 mg, Oral, TID  insulin lispro, 2 Units, Subcutaneous, TID With Meals  insulin lispro, 2-9 Units, Subcutaneous, 4x Daily AC & at Bedtime  linagliptin, 5 mg, Oral, Daily  melatonin, 2.5 mg, Oral, Nightly  metoprolol succinate XL, 25 mg, Oral, Daily  piperacillin-tazobactam, 3.375 g, Intravenous, Q8H  sodium chloride, 3 mL, Intravenous, Q12H      Continuous Infusions:sodium chloride 0.45 % 925 mL with sodium bicarbonate 8.4 % 75 mEq infusion, , Last Rate: 100 mL/hr at 03/05/25 0841      PRN Meds:.  acetaminophen **OR** acetaminophen **OR** acetaminophen    senna-docusate sodium **AND** polyethylene glycol **AND** bisacodyl **AND** bisacodyl     dextrose    dextrose    famotidine    glucagon (human recombinant)    HYDROcodone-acetaminophen    Morphine **AND** naloxone    nitroglycerin    ondansetron ODT **OR** ondansetron    sodium chloride    sodium chloride    Assessment & Plan   Assessment & Plan     Active Hospital Problems    Diagnosis  POA    **Traumatic rhabdomyolysis [T79.6XXA]  Yes    Closed displaced fracture of left clavicle [S42.002A]  Yes    Pneumonia due to infectious organism [J18.9]  Yes    Persistent atrial fibrillation [I48.19]  Yes    Chronic anticoagulation [Z79.01]  Not Applicable    Stage 3b chronic kidney disease [N18.32]  Yes    Chronic diastolic (congestive) heart failure [I50.32]  Yes    KILLIAN (acute kidney injury) [N17.9]  Yes    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        Brief Hospital Course to date:  Rock Maciel Jr. is a 79 y.o. male   presents the hospital after fall at home while using his stair chair lift with closed head injury and injury to the left shoulder causing left clavicle fracture.  He was found to have rhabdomyolysis and acute renal failure.     Discussion/plan for today:   Discussed with nursing.  Bladder scan shows significant retaining urine.  Discussed with nephrologist over the phone.  Plan to place a Johnson catheter and consult urology.  Flomax has been initiated.  Discussed with nephrology we will stop fluids now has patient appears amply hydrated.  Rhabdomyolysis improving.  Worsening anemia likely from dilution and some bruising.  No overt bleeding.  Transfuse 1 unit today and monitor.  Monitor fluid status carefully.   Required straight catheterization.   ATN slightly improved.  Leukocytosis stable.  Outpatient orthopedic follow-up for clavicle fracture.  Continue fall prevention and PT.  Glucose reviewed and insulin adjusted.    Rhabdomyolysis:  Treat with IV fluid.  Nephrology consulted to assist.  Trend CK.  Supportive care and symptom treatment.     Acute renal failure with CKD 3B with mild metabolic acidosis:  Nephrology consulted.  IV fluid.  Clinically appears dry and likely prerenal, and probable ATN.  Hold nephrotoxins and renally dose medications.     Possible pneumonia:  Significantly elevated leukocytosis, right lower lobe infiltrate, occasional cough.  Suspect possible aspiration pneumonia while patient was down.  Allergy to Ceclor noted.  Patient initiated on vancomycin and Zosyn in emergency room.  Continue Zosyn and check MRSA nasal screen was negative.  No further vancomycin    Anemia:  Secondary to dilution.  Anemia lab workup.  Blood transfusion 3/5/2025.     Clavicle fracture:  Orthopedic evaluated and will follow-up in clinic.  On x-ray images reviewed and comminuted distal clavicle fracture with displacement of the clavicle shaft     Fall and physical debility and Charcot foot deformity: PT OT evaluation.  Fall prevention.     Type 2 diabetes: Monitor blood sugar and adjust insulin as needed.  Tradjenta     Atrial fibrillation: Eliquis anticoagulation.  Continue beta-blocker.  Continue home amiodarone.     Hyperlipidemia: Continue statin.  Stable.     Treatment plan discussed with patient who is in agreement.     DVT prophylaxis: Anticoagulant            Disposition: Patient would like to return home with assistance from a friend when possible    CODE STATUS:   Code Status and Medical Interventions: CPR (Attempt to Resuscitate); Full Support   Ordered at: 03/03/25 2660     Level Of Support Discussed With:    Patient     Code Status (Patient has no pulse and is not breathing):    CPR (Attempt to Resuscitate)      Medical Interventions (Patient has pulse or is breathing):    Full Support       Bishop Chakraborty MD  03/05/25

## 2025-03-05 NOTE — CONSULTS
[facility] CONS[facility]      Patient Identification:  NAME:  Rock Maciel Jr.  Age:  79 y.o.   Sex:  male   :  1945   MRN:  [id number]     Chief complaint: Fall    History of present illness:      Rock Maciel Jr. is a 79 y.o. male with a history of CKD stage 3, retention d/t BPH, kidney stones who presented to the ER on 3/3/2025 following a fall at home. Pt diagnosed and admitted to the hospital with traumatic rhabdomyolysis, acute renal failure, sepsis, pneumonia, anemia, hyperglycemia, left clavicle fracture. Bladder scans have shown significant bladder retention this admission and Johnson catheter placed. Flomax was initiated and urology consulted. Patient reports no issues urinating at home. States he does urinate a lot and gets up at night, thinks it is related to Bumex. Feels he empties his bladder well. Denies dysuria, hematuria, family history of prostate CA. He uses walker at home, has not been getting up this hospital admission. He had appointment with [facility] scheduled in 2024 but he missed this.     In hospital:  -AVSS, good UOP  -WBC - 14.98  -Creat - 3.91  -UA - Moderate blood, 2+ protein  Blood culture- no growth    Asked to see    Past medical history:  Past Medical History:   Diagnosis Date    Allergic rhinitis     Bronchitis     Coronary artery disease     Diabetes mellitus     DM (diabetes mellitus)     Encounter for annual health examination 2014    Annual Health Assessment    Gout     Hiatal hernia     History of MRSA infection     RIGHT FOOT     Hyperlipidemia     Hypertension     Murmur     Pneumothorax on right     Sleep apnea     pt wears CPAP at night    Wellness examination 2015    Annual Wellness Visit       Past surgical history:  Past Surgical History:   Procedure Laterality Date    ARTERY SURGERY Bilateral     carotid    CARDIAC CATHETERIZATION N/A 2018    Procedure: Left Heart Cath;  Surgeon: Jo Toney MD;  Location:   [facility] CATH INVASIVE LOCATION;  Service: Cardiovascular    CARDIAC CATHETERIZATION N/A 7/20/2018    Procedure: Coronary angiography;  Surgeon: Jo Toney MD;  Location: [facility] CATH INVASIVE LOCATION;  Service: Cardiovascular    CAROTID ENDARTERECTOMY Bilateral     COLONOSCOPY  2008    CORONARY ARTERY BYPASS GRAFT WITH AORTIC VALVE REPAIR/REPLACEMENT N/A 7/23/2018    Procedure: INTRAOPERATIVE ALEX, MIDLINE STERNOTOMY, CORONARY ARTERY BYPASS GRAFTING X 3 USING ENDOSCOPICALLY HARVESTED LEFT GREATER SAPHENOUS VEIN,  AORTIC VALVE REPLACEMENT USING 25MM LOPEZ II ULTRA PORCINE VALVE, PRP;  Surgeon: Phong Posey MD;  Location: [facility] MAIN OR;  Service: Cardiothoracic    FOOT SURGERY Right 2010    5th digit removal    FOOT SURGERY Left 2011    1 digit removed    INCISION AND DRAINAGE LEG Right 3/28/2020    Procedure: DEBRIDMENT OF RIGHT CALF;  Surgeon: Ross Pineda MD;  Location: [facility] MAIN OR;  Service: Vascular;  Laterality: Right;    INGUINAL HERNIA REPAIR Left 11/29/2024    Procedure: INGUINAL HERNIA REPAIR LAPAROSCOPIC WITH DAVINCI ROBOT;  Surgeon: Phong Lazo MD;  Location: [facility] OR;  Service: Robotics - DaVinci;  Laterality: Left;    QUADRICEPS TENDON REPAIR Left 5/14/2019    Procedure: Left QUADRICEPS TENDON REPAIR;  Surgeon: Camilo Hunter MD;  Location: [facility] OR;  Service: Orthopedics    THORACENTESIS Right 11/21/2016    THORACOSCOPY Right 5/8/2017    Procedure: BRONCHOSCOPY, RIGHT VAT,  TOTAL DECORTICATION RIGHT LUNG, PLEURAL BX, PLACEMENT SUBPLEURAL PAIN CAATHETERS X2;  Surgeon: Donald Orlando III, MD;  Location: [facility] OR;  Service:     TONSILECTOMY, ADENOIDECTOMY, BILATERAL MYRINGOTOMY AND TUBES         Allergies:  Ceclor [cefaclor]    Home medications:  Medications Prior to Admission   Medication Sig Dispense Refill Last Dose/Taking    acetaminophen (TYLENOL) 325 MG tablet Take 2 tablets by mouth Every 6 (Six) Hours As Needed for Mild Pain.    3/2/2025    amiodarone (PACERONE) 200 MG tablet Take 1 tablet by mouth Daily. 30 tablet 5 3/2/2025    apixaban (ELIQUIS) 2.5 MG tablet tablet Take 1 tablet by mouth 2 (Two) Times a Day. Indications: Atrial Fibrillation 60 tablet 5 3/2/2025    atorvastatin (LIPITOR) 20 MG tablet Take 1 tablet by mouth Every Night. NEED TO SEE PROVIDER FOR FUTURE REFILLS. 30 tablet 0 3/2/2025    bumetanide (BUMEX) 1 MG tablet Take 1 tablet by mouth 2 (Two) Times a Day. 60 tablet 5 3/2/2025    Cholecalciferol 25 MCG (1000 UT) tablet Take 1 tablet by mouth Every 7 (Seven) Days.   3/2/2025    clopidogrel (PLAVIX) 75 MG tablet Take 1 tablet by mouth Daily. 90 tablet 1 3/2/2025    glipizide (GLUCOTROL) 5 MG tablet TAKE 1 TABLET BY MOUTH 2 TIMES A DAY BEFORE MEALS. 180 tablet 1 3/2/2025    glucose blood (Accu-Chek Keshia Plus) test strip USE TO TEST BLOOD SUGAR    TWICE DAILY FOR DIABETES 100 each 8 3/2/2025    hydrALAZINE (APRESOLINE) 25 MG tablet Take 1 tablet by mouth 3 (Three) Times a Day. 90 tablet 5 3/2/2025    linagliptin (Tradjenta) 5 MG tablet tablet Take 1 tablet by mouth Daily. 90 tablet 1 Past Week    metoprolol succinate XL (TOPROL-XL) 25 MG 24 hr tablet Take 1 tablet by mouth Daily. 30 tablet 5 3/2/2025    terazosin (HYTRIN) 2 MG capsule Take 1 capsule by mouth Every Night. 90 capsule 1 Past Week    vitamin C (ASCORBIC ACID) 250 MG tablet Take 1 tablet by mouth Daily.   3/2/2025    Zinc 50 MG tablet    3/2/2025    nitroglycerin (NITROSTAT) 0.4 MG SL tablet Place 1 tablet under the tongue Every 5 (Five) Minutes As Needed for Chest Pain (Only if SBP Greater Than 100). Take no more than 3 doses in 15 minutes. (Patient not taking: Reported on 12/20/2024) 25 tablet 0 Unknown        Hospital medications:  amiodarone, 200 mg, Oral, Daily  apixaban, 2.5 mg, Oral, BID  vitamin C, 250 mg, Oral, Daily  atorvastatin, 20 mg, Oral, Nightly  cholecalciferol, 1,000 Units, Oral, Daily  [Held by provider] clopidogrel, 75 mg, Oral, Daily  ferric  gluconate, 250 mg, Intravenous, Daily  [Held by provider] hydrALAZINE, 25 mg, Oral, TID  insulin lispro, 2-9 Units, Subcutaneous, 4x Daily AC & at Bedtime  insulin lispro, 3 Units, Subcutaneous, TID With Meals  linagliptin, 5 mg, Oral, Daily  melatonin, 2.5 mg, Oral, Nightly  metoprolol succinate XL, 25 mg, Oral, Daily  piperacillin-tazobactam, 3.375 g, Intravenous, Q8H  sodium chloride, 3 mL, Intravenous, Q12H  tamsulosin, 0.4 mg, Oral, Daily           acetaminophen **OR** acetaminophen **OR** acetaminophen    senna-docusate sodium **AND** polyethylene glycol **AND** bisacodyl **AND** bisacodyl    dextrose    dextrose    famotidine    glucagon (human recombinant)    HYDROcodone-acetaminophen    Morphine **AND** naloxone    nitroglycerin    ondansetron ODT **OR** ondansetron    sodium chloride    sodium chloride    Family history:  Family History   Problem Relation Age of Onset    Hypertension Father     Malig Hyperthermia Neg Hx        Social history:  Social History     Tobacco Use    Smoking status: Never     Passive exposure: Never    Smokeless tobacco: Never   Vaping Use    Vaping status: Never Used   Substance Use Topics    Alcohol use: Yes     Comment: either one glass wine or one glass liquor per  day    Drug use: Never       Review of systems:      12 point negative except as in HPI    Objective:  TMax 24 hours:   Temp (24hrs), Av.8 °F (37.1 °C), Min:98.4 °F (36.9 °C), Max:99.7 °F (37.6 °C)      Vitals Ranges:   Temp:  [98.4 °F (36.9 °C)-99.7 °F (37.6 °C)] 98.4 °F (36.9 °C)  Heart Rate:  [64-73] 65  Resp:  [18] 18  BP: (113-139)/(49-54) 125/49    Intake/Output Last 3 shifts:  I/O last 3 completed shifts:  In: 960 [P.O.:960]  Out: 1900 [Urine:1900]     Physical Exam:    General Appearance:    Alert, cooperative, NAD   :    Johnson catheter in place draining clear yellow urine   Neuro/Psych:   Orientation intact, mood/affect pleasant       Results review:   I reviewed the patient's new clinical  results.    Data review:  Lab Results (last 24 hours)       Procedure Component Value Units Date/Time    POC Glucose Once [[id number]]  (Abnormal) Collected: 03/05/25 1128    Specimen: Blood Updated: 03/05/25 1135     Glucose 277 mg/dL     Reticulocytes [[id number]]  (Normal) Collected: 03/05/25 0604    Specimen: Blood Updated: 03/05/25 1125     Reticulocyte % 1.48 %      Reticulocyte Absolute 0.0311 10*6/mm3     Ferritin [[id number]]  (Abnormal) Collected: 03/05/25 0604    Specimen: Blood Updated: 03/05/25 0744     Ferritin 550.00 ng/mL     Narrative:      Results may be falsely decreased if patient taking Biotin.      Iron Profile [[id number]]  (Abnormal) Collected: 03/05/25 0604    Specimen: Blood Updated: 03/05/25 0740     Iron 19 mcg/dL      Iron Saturation (TSAT) 11 %      Transferrin 116 mg/dL      TIBC 173 mcg/dL     Comprehensive Metabolic Panel [[id number]]  (Abnormal) Collected: 03/05/25 0604    Specimen: Blood Updated: 03/05/25 0709     Glucose 126 mg/dL      BUN 71 mg/dL      Creatinine 3.91 mg/dL      Sodium 138 mmol/L      Potassium 3.9 mmol/L      Chloride 101 mmol/L      CO2 24.9 mmol/L      Calcium 7.8 mg/dL      Total Protein 5.5 g/dL      Albumin 2.4 g/dL      ALT (SGPT) 53 U/L      AST (SGOT) 82 U/L      Alkaline Phosphatase 163 U/L      Total Bilirubin 0.6 mg/dL      Globulin 3.1 gm/dL      A/G Ratio 0.8 g/dL      BUN/Creatinine Ratio 18.2     Anion Gap 12.1 mmol/L      eGFR 14.8 mL/min/1.73     Narrative:      GFR Categories in Chronic Kidney Disease (CKD)      GFR Category          GFR (mL/min/1.73)    Interpretation  G1                     90 or greater         Normal or high (1)  G2                      60-89                Mild decrease (1)  G3a                   45-59                Mild to moderate decrease  G3b                   30-44                Moderate to severe decrease  G4                    15-29                Severe decrease  G5                    14 or less           Kidney  failure          (1)In the absence of evidence of kidney disease, neither GFR category G1 or G2 fulfill the criteria for CKD.    eGFR calculation 2021 CKD-EPI creatinine equation, which does not include race as a factor    CK [[id number]]  (Abnormal) Collected: 03/05/25 0604    Specimen: Blood Updated: 03/05/25 0708     Creatine Kinase 1,005 U/L     CBC (No Diff) [[id number]]  (Abnormal) Collected: 03/05/25 0604    Specimen: Blood Updated: 03/05/25 0655     WBC 14.98 10*3/mm3      RBC 2.14 10*6/mm3      Hemoglobin 6.2 g/dL      Hematocrit 18.9 %      MCV 88.3 fL      MCH 29.0 pg      MCHC 32.8 g/dL      RDW 14.8 %      RDW-SD 47.1 fl      MPV 10.5 fL      Platelets 182 10*3/mm3     POC Glucose Once [[id number]]  (Abnormal) Collected: 03/05/25 0611    Specimen: Blood Updated: 03/05/25 0612     Glucose 150 mg/dL     Protein / Creatinine Ratio, Urine - Straight Cath [[id number]]  (Abnormal) Collected: 03/05/25 0012    Specimen: Urine from Straight Cath Updated: 03/05/25 0059     Protein/Creatinine Ratio, Urine 621.9 mg/G Crea      Creatinine, Urine 64.8 mg/dL      Total Protein, Urine 40.3 mg/dL     Microalbumin / Creatinine Urine Ratio - Straight Cath [[id number]]  (Abnormal) Collected: 03/05/25 0012    Specimen: Urine from Straight Cath Updated: 03/05/25 0059     Microalbumin/Creatinine Ratio 146.6 mg/g      Creatinine, Urine 64.8 mg/dL      Microalbumin, Urine 9.5 mg/dL     Urinalysis, Microscopic Only - Straight Cath [[id number]] Collected: 03/05/25 0012    Specimen: Urine from Straight Cath Updated: 03/05/25 0054     RBC, UA 0-2 /HPF      WBC, UA 0-2 /HPF      Bacteria, UA None Seen /HPF      Squamous Epithelial Cells, UA 0-2 /HPF      Hyaline Casts, UA 0-2 /LPF      Methodology Automated Microscopy    Urinalysis With Microscopic If Indicated (No Culture) - Straight Cath [[id number]]  (Abnormal) Collected: 03/05/25 0012    Specimen: Urine from Straight Cath Updated: 03/05/25 0054     Color, UA Yellow     Appearance, UA  Cloudy     pH, UA 5.5     Specific Gravity, UA 1.015     Glucose, UA Negative     Ketones, UA Negative     Bilirubin, UA Negative     Blood, UA Moderate (2+)     Protein,  mg/dL (2+)     Leuk Esterase, UA Negative     Nitrite, UA Negative     Urobilinogen, UA 1.0 E.U./dL    POC Glucose Once [[id number]]  (Abnormal) Collected: 03/04/25 2051    Specimen: Blood Updated: 03/04/25 2052     Glucose 162 mg/dL     Blood Culture - Blood, Hand, Right [[id number]]  (Normal) Collected: 03/03/25 1745    Specimen: Blood from Hand, Right Updated: 03/04/25 1815     Blood Culture No growth at 24 hours    Blood Culture - Blood, Arm, Left [[id number]]  (Normal) Collected: 03/03/25 1747    Specimen: Blood from Arm, Left Updated: 03/04/25 1815     Blood Culture No growth at 24 hours    POC Glucose Once [[id number]]  (Abnormal) Collected: 03/04/25 1608    Specimen: Blood Updated: 03/04/25 1611     Glucose 171 mg/dL              Imaging:  Imaging Results (Last 24 Hours)       ** No results found for the last 24 hours. **             Assessment:     Urinary retention  BPH    Plan:   - No acute urologic surgical intervention recommended.   - Continue Flomax  - Continue indwelling catheter for now. Recommend voiding trial early AM on day of discharge once patient is ambulatory.   - If unable to void and/or PVR >250 mL, replace Johnson and discharge home with Johnson in place.   - If the patient is discharged with an indwelling catheter, please arrange an outpatient consult in the urology office: [phone]  - Urology will sign off; please call with any questions/concerns or clinical change     ZEINA Tejada  03/05/25

## 2025-03-06 ENCOUNTER — APPOINTMENT (OUTPATIENT)
Dept: GENERAL RADIOLOGY | Facility: HOSPITAL | Age: 80
End: 2025-03-06
Payer: MEDICARE

## 2025-03-06 LAB
ALBUMIN SERPL-MCNC: 2.5 G/DL (ref 3.5–5.2)
ALBUMIN/GLOB SERPL: 0.8 G/DL
ALP SERPL-CCNC: 220 U/L (ref 39–117)
ALT SERPL W P-5'-P-CCNC: 61 U/L (ref 1–41)
ANION GAP SERPL CALCULATED.3IONS-SCNC: 9.7 MMOL/L (ref 5–15)
AST SERPL-CCNC: 63 U/L (ref 1–40)
BH BB BLOOD EXPIRATION DATE: NORMAL
BH BB BLOOD TYPE BARCODE: 5100
BH BB DISPENSE STATUS: NORMAL
BH BB PRODUCT CODE: NORMAL
BH BB UNIT NUMBER: NORMAL
BILIRUB SERPL-MCNC: 0.9 MG/DL (ref 0–1.2)
BUN SERPL-MCNC: 67 MG/DL (ref 8–23)
BUN/CREAT SERPL: 17.4 (ref 7–25)
CALCIUM SPEC-SCNC: 8 MG/DL (ref 8.6–10.5)
CHLORIDE SERPL-SCNC: 101 MMOL/L (ref 98–107)
CK SERPL-CCNC: 300 U/L (ref 20–200)
CO2 SERPL-SCNC: 24.3 MMOL/L (ref 22–29)
CREAT SERPL-MCNC: 3.85 MG/DL (ref 0.76–1.27)
CROSSMATCH INTERPRETATION: NORMAL
DEPRECATED RDW RBC AUTO: 49.3 FL (ref 37–54)
EGFRCR SERPLBLD CKD-EPI 2021: 15.1 ML/MIN/1.73
ERYTHROCYTE [DISTWIDTH] IN BLOOD BY AUTOMATED COUNT: 14.8 % (ref 12.3–15.4)
FOLATE SERPL-MCNC: 7.27 NG/ML (ref 4.78–24.2)
GLOBULIN UR ELPH-MCNC: 3.2 GM/DL
GLUCOSE BLDC GLUCOMTR-MCNC: 164 MG/DL (ref 70–130)
GLUCOSE BLDC GLUCOMTR-MCNC: 204 MG/DL (ref 70–130)
GLUCOSE BLDC GLUCOMTR-MCNC: 239 MG/DL (ref 70–130)
GLUCOSE BLDC GLUCOMTR-MCNC: 245 MG/DL (ref 70–130)
GLUCOSE SERPL-MCNC: 147 MG/DL (ref 65–99)
HCT VFR BLD AUTO: 21.6 % (ref 37.5–51)
HGB BLD-MCNC: 7 G/DL (ref 13–17.7)
L PNEUMO1 AG UR QL IA: NEGATIVE
MCH RBC QN AUTO: 29.5 PG (ref 26.6–33)
MCHC RBC AUTO-ENTMCNC: 32.4 G/DL (ref 31.5–35.7)
MCV RBC AUTO: 91.1 FL (ref 79–97)
PLATELET # BLD AUTO: 193 10*3/MM3 (ref 140–450)
PMV BLD AUTO: 10.1 FL (ref 6–12)
POTASSIUM SERPL-SCNC: 4.1 MMOL/L (ref 3.5–5.2)
PROT SERPL-MCNC: 5.7 G/DL (ref 6–8.5)
RBC # BLD AUTO: 2.37 10*6/MM3 (ref 4.14–5.8)
S PNEUM AG SPEC QL LA: NEGATIVE
SODIUM SERPL-SCNC: 135 MMOL/L (ref 136–145)
UNIT  ABO: NORMAL
UNIT  RH: NORMAL
VIT B12 BLD-MCNC: 616 PG/ML (ref 211–946)
WBC NRBC COR # BLD AUTO: 15.51 10*3/MM3 (ref 3.4–10.8)

## 2025-03-06 PROCEDURE — 97530 THERAPEUTIC ACTIVITIES: CPT

## 2025-03-06 PROCEDURE — 87147 CULTURE TYPE IMMUNOLOGIC: CPT | Performed by: PODIATRIST

## 2025-03-06 PROCEDURE — 87186 SC STD MICRODIL/AGAR DIL: CPT | Performed by: PODIATRIST

## 2025-03-06 PROCEDURE — 82948 REAGENT STRIP/BLOOD GLUCOSE: CPT

## 2025-03-06 PROCEDURE — 87205 SMEAR GRAM STAIN: CPT | Performed by: PODIATRIST

## 2025-03-06 PROCEDURE — 87070 CULTURE OTHR SPECIMN AEROBIC: CPT | Performed by: PODIATRIST

## 2025-03-06 PROCEDURE — 87449 NOS EACH ORGANISM AG IA: CPT | Performed by: INTERNAL MEDICINE

## 2025-03-06 PROCEDURE — 25010000002 PIPERACILLIN SOD-TAZOBACTAM PER 1 G: Performed by: INTERNAL MEDICINE

## 2025-03-06 PROCEDURE — 82607 VITAMIN B-12: CPT | Performed by: INTERNAL MEDICINE

## 2025-03-06 PROCEDURE — 25810000003 SODIUM CHLORIDE 0.9 % SOLUTION: Performed by: STUDENT IN AN ORGANIZED HEALTH CARE EDUCATION/TRAINING PROGRAM

## 2025-03-06 PROCEDURE — 63710000001 INSULIN LISPRO (HUMAN) PER 5 UNITS: Performed by: INTERNAL MEDICINE

## 2025-03-06 PROCEDURE — 82746 ASSAY OF FOLIC ACID SERUM: CPT | Performed by: INTERNAL MEDICINE

## 2025-03-06 PROCEDURE — 97162 PT EVAL MOD COMPLEX 30 MIN: CPT

## 2025-03-06 PROCEDURE — 82550 ASSAY OF CK (CPK): CPT | Performed by: STUDENT IN AN ORGANIZED HEALTH CARE EDUCATION/TRAINING PROGRAM

## 2025-03-06 PROCEDURE — 25010000002 NA FERRIC GLUC CPLX PER 12.5 MG: Performed by: STUDENT IN AN ORGANIZED HEALTH CARE EDUCATION/TRAINING PROGRAM

## 2025-03-06 PROCEDURE — 97166 OT EVAL MOD COMPLEX 45 MIN: CPT

## 2025-03-06 PROCEDURE — 87899 AGENT NOS ASSAY W/OPTIC: CPT | Performed by: INTERNAL MEDICINE

## 2025-03-06 PROCEDURE — 80053 COMPREHEN METABOLIC PANEL: CPT | Performed by: INTERNAL MEDICINE

## 2025-03-06 PROCEDURE — 85027 COMPLETE CBC AUTOMATED: CPT | Performed by: INTERNAL MEDICINE

## 2025-03-06 PROCEDURE — 73630 X-RAY EXAM OF FOOT: CPT

## 2025-03-06 RX ORDER — INSULIN LISPRO 100 [IU]/ML
5 INJECTION, SOLUTION INTRAVENOUS; SUBCUTANEOUS
Status: DISCONTINUED | OUTPATIENT
Start: 2025-03-06 | End: 2025-03-07

## 2025-03-06 RX ORDER — HYDRALAZINE HYDROCHLORIDE 10 MG/1
10 TABLET, FILM COATED ORAL 3 TIMES DAILY
Status: DISCONTINUED | OUTPATIENT
Start: 2025-03-06 | End: 2025-03-19 | Stop reason: HOSPADM

## 2025-03-06 RX ORDER — PANTOPRAZOLE SODIUM 40 MG/1
40 TABLET, DELAYED RELEASE ORAL
Status: DISCONTINUED | OUTPATIENT
Start: 2025-03-06 | End: 2025-03-19 | Stop reason: HOSPADM

## 2025-03-06 RX ORDER — BUMETANIDE 1 MG/1
1 TABLET ORAL
Status: DISCONTINUED | OUTPATIENT
Start: 2025-03-06 | End: 2025-03-09

## 2025-03-06 RX ADMIN — AMIODARONE HYDROCHLORIDE 200 MG: 200 TABLET ORAL at 08:30

## 2025-03-06 RX ADMIN — PANTOPRAZOLE SODIUM 40 MG: 40 TABLET, DELAYED RELEASE ORAL at 08:29

## 2025-03-06 RX ADMIN — INSULIN LISPRO 5 UNITS: 100 INJECTION, SOLUTION INTRAVENOUS; SUBCUTANEOUS at 17:09

## 2025-03-06 RX ADMIN — INSULIN LISPRO 4 UNITS: 100 INJECTION, SOLUTION INTRAVENOUS; SUBCUTANEOUS at 11:34

## 2025-03-06 RX ADMIN — PIPERACILLIN AND TAZOBACTAM 3.38 G: 3; .375 INJECTION, POWDER, FOR SOLUTION INTRAVENOUS at 16:08

## 2025-03-06 RX ADMIN — TAMSULOSIN HYDROCHLORIDE 0.4 MG: 0.4 CAPSULE ORAL at 08:30

## 2025-03-06 RX ADMIN — APIXABAN 2.5 MG: 2.5 TABLET, FILM COATED ORAL at 08:30

## 2025-03-06 RX ADMIN — PIPERACILLIN AND TAZOBACTAM 3.38 G: 3; .375 INJECTION, POWDER, FOR SOLUTION INTRAVENOUS at 08:28

## 2025-03-06 RX ADMIN — HYDROCODONE BITARTRATE AND ACETAMINOPHEN 0.5 TABLET: 5; 325 TABLET ORAL at 20:45

## 2025-03-06 RX ADMIN — INSULIN LISPRO 3 UNITS: 100 INJECTION, SOLUTION INTRAVENOUS; SUBCUTANEOUS at 10:27

## 2025-03-06 RX ADMIN — OXYCODONE HYDROCHLORIDE AND ACETAMINOPHEN 250 MG: 500 TABLET ORAL at 08:30

## 2025-03-06 RX ADMIN — ATORVASTATIN CALCIUM 20 MG: 20 TABLET, FILM COATED ORAL at 20:38

## 2025-03-06 RX ADMIN — INSULIN LISPRO 3 UNITS: 100 INJECTION, SOLUTION INTRAVENOUS; SUBCUTANEOUS at 11:34

## 2025-03-06 RX ADMIN — Medication 2.5 MG: at 20:36

## 2025-03-06 RX ADMIN — SENNOSIDES AND DOCUSATE SODIUM 2 TABLET: 50; 8.6 TABLET ORAL at 20:45

## 2025-03-06 RX ADMIN — Medication 1000 UNITS: at 08:30

## 2025-03-06 RX ADMIN — BUMETANIDE 1 MG: 1 TABLET ORAL at 16:21

## 2025-03-06 RX ADMIN — INSULIN LISPRO 4 UNITS: 100 INJECTION, SOLUTION INTRAVENOUS; SUBCUTANEOUS at 21:12

## 2025-03-06 RX ADMIN — HYDRALAZINE HYDROCHLORIDE 10 MG: 10 TABLET ORAL at 16:41

## 2025-03-06 RX ADMIN — LINAGLIPTIN 5 MG: 5 TABLET, FILM COATED ORAL at 08:30

## 2025-03-06 RX ADMIN — BUMETANIDE 1 MG: 1 TABLET ORAL at 19:00

## 2025-03-06 RX ADMIN — APIXABAN 2.5 MG: 2.5 TABLET, FILM COATED ORAL at 20:36

## 2025-03-06 RX ADMIN — HYDRALAZINE HYDROCHLORIDE 10 MG: 10 TABLET ORAL at 20:36

## 2025-03-06 RX ADMIN — INSULIN LISPRO 2 UNITS: 100 INJECTION, SOLUTION INTRAVENOUS; SUBCUTANEOUS at 08:29

## 2025-03-06 RX ADMIN — METOPROLOL SUCCINATE 25 MG: 25 TABLET, EXTENDED RELEASE ORAL at 08:30

## 2025-03-06 RX ADMIN — Medication 3 ML: at 10:28

## 2025-03-06 RX ADMIN — SODIUM CHLORIDE 250 MG: 9 INJECTION, SOLUTION INTRAVENOUS at 08:29

## 2025-03-06 RX ADMIN — ACETAMINOPHEN 650 MG: 325 TABLET, FILM COATED ORAL at 23:47

## 2025-03-06 RX ADMIN — INSULIN LISPRO 4 UNITS: 100 INJECTION, SOLUTION INTRAVENOUS; SUBCUTANEOUS at 16:41

## 2025-03-06 RX ADMIN — PIPERACILLIN AND TAZOBACTAM 3.38 G: 3; .375 INJECTION, POWDER, FOR SOLUTION INTRAVENOUS at 23:46

## 2025-03-06 NOTE — CASE MANAGEMENT/SOCIAL WORK
Continued Stay Note  [facility]     Patient Name: Rock Maciel Jr.  MRN: [id number]  Today's Date: 3/6/2025    Admit Date: 3/3/2025    Plan: Awaiting pt choice regarding referrals for SNF, pt will need pre-cert.   Discharge Plan       Row Name 03/06/25 1413       Plan    Plan Awaiting pt choice regarding referrals for SNF, pt will need pre-cert.    Patient/Family in Agreement with Plan yes    Plan Comments PT recs reviewed. CCP met with pt at the bedside to discuss DC. CCP explained PT recs for SNF. Pt verbalized understanding and is agreeable. CCP discussed pt choice list, referral and pre-cert process. Pt will review pt choice list and notify CCP of referrals to be made for SNF. Leonardo AYON/CCP                   Discharge Codes    No documentation.                 Expected Discharge Date and Time       Expected Discharge Date Expected Discharge Time    Mar 7, 2025               Pauly Martinez RN

## 2025-03-06 NOTE — PLAN OF CARE
Goal Outcome Evaluation:  Plan of Care Reviewed With: patient        Progress: no change  Outcome Evaluation: Pt is a 79 y.o. male admitted to [facility] with c/o of a fall with closed head injury and left shoulder pain on 3/3/2025. Work up reveals traumatic rhabdomyolysis and L distal clavicle fracture. PMHx includes HTN, DM, charcot freeman tooth disease, stage 3 kidney disease, a-fib and heart block. Prior to hospital admission, pt reports residing in house alone and 3 DO to enter the house. Pt also has a upstairs and downstairs that he uses a chair lift to reach which is how he feel this most recent time when going up the stairs. Prior to hospital stay, pt was I with ADLs and utilized walker AD at baseline. Pt required max A x 1 to attempt to sit to the edge of the bed for bed mobility. Pt is unable to complete with 1 person assist on this date and has high levels of pain when attempting to transfer and is took weak to assist and attempt further transfers today.  Pt presents today with below baseline strength, dec endurance, and decreased functional mobility. Pt will benefit from skilled PT to address the previous deficits and improve overall safety with functional mobility. Anticipate pt will D/C to SNF pending progress.    Anticipated Discharge Disposition (PT): skilled nursing facility

## 2025-03-06 NOTE — CONSULTS
Podiatry Consult Note      Patient: Rock Maciel Jr. Admit Date: 03/03/2025    Age: 79 y.o.   PCP: Denise Dickson APRN    MRN: [id number]  Room: [facility]/        Subjective     Chief Complaint     Chief Complaint   Patient presents with    Fall        HPI     This is an 79-year-old male who has a right foot deformity and subsequent ulcer under the right lateral midfoot.  Patient states he has had the ulcer for a couple of weeks.  He has seen an outside podiatrist but this was several months ago.  He has not had any other treatment for his right foot.  He has a history of amputations of his right foot as well as a left foot TMA.  No x-rays have been performed in several years.    Past Medical History     Past Medical History:   Diagnosis Date    Allergic rhinitis     Bronchitis     Coronary artery disease     Diabetes mellitus 2001    DM (diabetes mellitus)     Encounter for annual health examination 05/06/2014    Annual Health Assessment    Gout     Hiatal hernia     History of MRSA infection     RIGHT FOOT 2010    Hyperlipidemia     Hypertension     Murmur     Pneumothorax on right     Sleep apnea     pt wears CPAP at night    Wellness examination 06/24/2015    Annual Wellness Visit        Past Surgical History:   Procedure Laterality Date    ARTERY SURGERY Bilateral     carotid    CARDIAC CATHETERIZATION N/A 7/20/2018    Procedure: Left Heart Cath;  Surgeon: Jo Toney MD;  Location: [facility] CATH INVASIVE LOCATION;  Service: Cardiovascular    CARDIAC CATHETERIZATION N/A 7/20/2018    Procedure: Coronary angiography;  Surgeon: Jo Toney MD;  Location: [facility] CATH INVASIVE LOCATION;  Service: Cardiovascular    CAROTID ENDARTERECTOMY Bilateral     COLONOSCOPY  2008    CORONARY ARTERY BYPASS GRAFT WITH AORTIC VALVE REPAIR/REPLACEMENT N/A 7/23/2018    Procedure: INTRAOPERATIVE ALEX, MIDLINE STERNOTOMY, CORONARY ARTERY BYPASS GRAFTING X 3 USING ENDOSCOPICALLY HARVESTED LEFT GREATER SAPHENOUS  VEIN,  AORTIC VALVE REPLACEMENT USING 25MM LOPEZ II ULTRA PORCINE VALVE, PRP;  Surgeon: Phong Posey MD;  Location: [facility] MAIN OR;  Service: Cardiothoracic    FOOT SURGERY Right 2010    5th digit removal    FOOT SURGERY Left 2011    1 digit removed    INCISION AND DRAINAGE LEG Right 3/28/2020    Procedure: DEBRIDMENT OF RIGHT CALF;  Surgeon: Ross Pineda MD;  Location: [facility] MAIN OR;  Service: Vascular;  Laterality: Right;    INGUINAL HERNIA REPAIR Left 11/29/2024    Procedure: INGUINAL HERNIA REPAIR LAPAROSCOPIC WITH DAVINCI ROBOT;  Surgeon: Phong Lazo MD;  Location: [facility] MAIN OR;  Service: Robotics - DaVinci;  Laterality: Left;    QUADRICEPS TENDON REPAIR Left 5/14/2019    Procedure: Left QUADRICEPS TENDON REPAIR;  Surgeon: Camilo Hunter MD;  Location: [facility] MAIN OR;  Service: Orthopedics    THORACENTESIS Right 11/21/2016    THORACOSCOPY Right 5/8/2017    Procedure: BRONCHOSCOPY, RIGHT VAT,  TOTAL DECORTICATION RIGHT LUNG, PLEURAL BX, PLACEMENT SUBPLEURAL PAIN CAATHETERS X2;  Surgeon: Donald Orlando III, MD;  Location: [facility] OR;  Service:     TONSILECTOMY, ADENOIDECTOMY, BILATERAL MYRINGOTOMY AND TUBES          Allergies   Allergen Reactions    Ceclor [Cefaclor] Rash     Rash in his 50s; he tolerated piperacillin-tazobactam in March 2020        Social History     Tobacco Use   Smoking Status Never    Passive exposure: Never   Smokeless Tobacco Never        Objective   Physical Exam    Vitals:    03/06/25 1332   BP: 143/58   Pulse: 67   Resp: 18   Temp: 99.7 °F (37.6 °C)   SpO2: 97%        Dermatology: Right plantar lateral ulceration with surrounding superficial abscess and blister.  There is deep extension to the central ulceration to the foot.  Moderate to severe erythema and edema present.  Mild malodor present.    Vascular: Pulses nonpalpable likely secondary to edema    Neurological: Light touch and protective sensation are absent    Musckuloskeletal: Left foot  what appears to be a TMA, right foot possible partial fourth and fifth ray amputations.    Labs     Lab Results   Component Value Date    HGBA1C 6.00 (H) 03/03/2025    POCGLU 239 (H) 03/06/2025    SEDRATE 95 (H) 03/26/2020        CBC:      Lab 03/06/25  0508 03/05/25  0604 03/04/25  0446 03/03/25  1408   WBC 15.51* 14.98* 14.09* 24.99*   HEMOGLOBIN 7.0* 6.2* 7.0* 9.5*   HEMATOCRIT 21.6* 18.9* 21.0* 29.4*   PLATELETS 193 182 185 253   NEUTROS ABS  --   --   --  22.44*   MCV 91.1 88.3 87.9 91.6          Results for orders placed or performed during the hospital encounter of 03/03/25   Blood Culture - Blood, Arm, Left    Specimen: Arm, Left; Blood   Result Value Ref Range    Blood Culture No growth at 2 days         XR Shoulder 2+ View Left, XR Humerus Left  XR SHOULDER 2+ VW LEFT-, XR HUMERUS LEFT-     HISTORY: 79-year-old male status post shoulder pain following a fall.     FINDINGS:  There is a comminuted fracture of the distal clavicle with 1.5 cm caudal  displacement of the clavicular shaft. 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 There is significant narrowing of the subacromial space.  There is no shoulder dislocation and the left humerus appears  unremarkable.     This report was finalized on 3/4/2025 7:15 AM by Dr. Latrice Rodriguez M.D on  Workstation: [id number]          Assessment/Plan     79-year-old male with right foot ulcer, possible abscess, possible osteomyelitis    -Patient examined evaluated  - I ordered x-rays of the foot to get a further evaluation  - I deroofed the blister at bedside.  There was some drainage present and direct deep extension through the central ulceration.  I have also ordered an MRI for further evaluation to rule out for deep abscess or osteomyelitis  - Nursing was at bedside, will continue Betadine dressings daily  - Culture swab taken of the drainage  - Will make definitive plan once MRI is  performed      Jimenez Mchugh DPM  [facility]  Office: [phone]

## 2025-03-06 NOTE — PROGRESS NOTES
[facility] Medicine Services  PROGRESS NOTE    Patient Name: Rock Maciel Jr.  : 1945  MRN: [id number]    Date of Admission: 3/3/2025  Primary Care Physician: Denise Dickson APRN    Subjective   Subjective     CC:  Follow-up for recent fall    Subjective:  Patient tolerating Johnson catheter.  Patient notes some bruising pain in his legs particularly in the knee region but reasonably controlled.  He is still feeling weak and wants to go to rehab.  He denies any sign of bleeding aside from the bruising and has no other new complaints    Review of Systems    No current fevers or chills  No current nausea, vomiting, or diarrhea  No current chest pain or palpitations     Objective   Objective     Vital Signs:   Temp:  [98.2 °F (36.8 °C)-99.7 °F (37.6 °C)] 99.7 °F (37.6 °C)  Heart Rate:  [64-70] 67  Resp:  [18] 18  BP: (125-149)/(44-60) 143/58        Physical Exam:    Constitutional: Awake, alert, elderly and chronically ill-appearing  HENT: Moist membranes moist, neck supple  Respiratory: No cough or wheezes, normal respirations, nonlabored breathing   Cardiovascular: Pulse rate is normal, palpable radial pulses  Gastrointestinal:  Soft, nontender, nondistended  Musculoskeletal: Significantly obese BMI is 36, mild lower extremity edema, physically debilitated in appearance, frail  Psychiatric: Appropriate affect, cooperative, conversational  Neurologic: No slurred speech or facial droop, follows commands  Skin: Right foot toe wound is present and unchanged from admission, currently with bandage in place, some bruising noted particularly in the bilateral lower extremity around the knee regions otherwise somewhat pale, no rashes or jaundice, warm          Results Reviewed:  Results from last 7 days   Lab Units 25  0508 25  0604 25  0446 25  1408   WBC 10*3/mm3 15.51* 14.98* 14.09* 24.99*   HEMOGLOBIN g/dL 7.0* 6.2* 7.0* 9.5*   HEMATOCRIT % 21.6* 18.9* 21.0* 29.4*   PLATELETS  10*3/mm3 193 182 185 253   INR   --   --   --  2.00*   PROCALCITONIN ng/mL  --   --   --  4.80*     Results from last 7 days   Lab Units 03/06/25  0508 03/05/25  0604 03/04/25  0446   SODIUM mmol/L 135* 138 140   POTASSIUM mmol/L 4.1 3.9 4.2   CHLORIDE mmol/L 101 101 106   CO2 mmol/L 24.3 24.9 21.0*   BUN mg/dL 67* 71* 69*   CREATININE mg/dL 3.85* 3.91* 4.06*   GLUCOSE mg/dL 147* 126* 128*   CALCIUM mg/dL 8.0* 7.8* 7.9*   ALK PHOS U/L 220* 163* 94   ALT (SGPT) U/L 61* 53* 30   AST (SGOT) U/L 63* 82* 79*     Estimated Creatinine Clearance: 21.4 mL/min (A) (by C-G formula based on SCr of 3.85 mg/dL (H)).    Microbiology Results Abnormal       None            Imaging Results (Last 24 Hours)       ** No results found for the last 24 hours. **            Results for orders placed during the hospital encounter of 10/03/24    Adult Transthoracic Echo Complete W/ Cont if Necessary Per Protocol    Interpretation Summary    Left ventricular ejection fraction appears to be 61 - 65%.    Left ventricular diastolic function was indeterminate.    The left atrial cavity is moderately dilated.    Left atrial volume is moderately increased.    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    Estimated right ventricular systolic pressure from tricuspid regurgitation is moderately elevated (45-55 mmHg).      I have reviewed the medications:  Scheduled Meds:amiodarone, 200 mg, Oral, Daily  apixaban, 2.5 mg, Oral, BID  vitamin C, 250 mg, Oral, Daily  atorvastatin, 20 mg, Oral, Nightly  bumetanide, 1 mg, Oral, BID Diuretics  cholecalciferol, 1,000 Units, Oral, Daily  [Held by provider] clopidogrel, 75 mg, Oral, Daily  ferric gluconate, 250 mg, Intravenous, Daily  [Held by provider] hydrALAZINE, 25 mg, Oral, TID  insulin lispro, 2-9 Units, Subcutaneous, 4x Daily AC & at Bedtime  insulin lispro, 3 Units, Subcutaneous, TID With Meals  linagliptin, 5 mg, Oral, Daily  melatonin, 2.5 mg, Oral, Nightly  metoprolol succinate XL, 25 mg, Oral,  Daily  pantoprazole, 40 mg, Oral, Q AM  piperacillin-tazobactam, 3.375 g, Intravenous, Q8H  sodium chloride, 3 mL, Intravenous, Q12H  tamsulosin, 0.4 mg, Oral, Daily      Continuous Infusions:     PRN Meds:.  acetaminophen **OR** acetaminophen **OR** acetaminophen    artificial tears    senna-docusate sodium **AND** polyethylene glycol **AND** bisacodyl **AND** bisacodyl    cadexomer iodine    dextrose    dextrose    famotidine    glucagon (human recombinant)    HYDROcodone-acetaminophen    Morphine **AND** naloxone    nitroglycerin    ondansetron ODT **OR** ondansetron    sodium chloride    sodium chloride    Assessment & Plan   Assessment & Plan     Active Hospital Problems    Diagnosis  POA    **Traumatic rhabdomyolysis [T79.6XXA]  Yes    ATN (acute tubular necrosis) [N17.0]  Yes    Closed displaced fracture of left clavicle [S42.002A]  Yes    Pneumonia due to infectious organism [J18.9]  Yes    Persistent atrial fibrillation [I48.19]  Yes    Chronic anticoagulation [Z79.01]  Not Applicable    Stage 3b chronic kidney disease [N18.32]  Yes    Chronic diastolic (congestive) heart failure [I50.32]  Yes    KILLIAN (acute kidney injury) [N17.9]  Yes    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        Brief Hospital Course to date:  Rock Maciel Jr. is a 79 y.o. male   presents the hospital after fall at home while using his stair chair lift with closed head injury and injury to the left shoulder causing left clavicle fracture.  He was found to have rhabdomyolysis and acute renal failure.     Discussion/plan for today:   Blood pressure is  stable and slightly elevated.  Plan to add back hydralazine at a reduced dose.  Continue Johnson catheter and plan eventual voiding trial perhaps the day of discharge.  IV iron initiated for anemia.  No bleeding noted likely some blood loss due to bruising with rhabdomyolysis.  ATN slowly improving.  Leukocytosis remains elevated but stable.  Monitor blood counts for now received transfusion on 3/5 and may need eventual transfusion again.  Outpatient orthopedic follow-up for clavicle fracture.  Continue fall prevention and PT.  Glucose reviewed and insulin adjusted with increasing prandial level.  Treatment plan discussed with patient is in agreement.    Rhabdomyolysis:  Treat with IV fluid.  Nephrology consulted to assist.  Trend CK.  Supportive care and symptom treatment.     Acute renal failure with CKD 3B with mild metabolic acidosis:  Nephrology consulted.  IV fluid.  Clinically appears dry and likely prerenal, and probable ATN.  Hold nephrotoxins and renally dose medications.     Possible pneumonia:  Significantly elevated leukocytosis, right lower lobe infiltrate, occasional cough.  Suspect possible aspiration pneumonia while patient was down.  Allergy to Ceclor noted.  Patient initiated on vancomycin and Zosyn in emergency room.  Continue Zosyn and check MRSA nasal screen was negative.  No further vancomycin.  Respiratory status has improved    Toe wound:  Noted present on admission.  Consult podiatry to weigh in and assist with treatment recommendations.    Anemia:  Secondary to dilution.  Anemia lab workup.  Blood transfusion 3/5/2025.     Clavicle fracture:  Orthopedic evaluated and will follow-up in clinic.  On x-ray images reviewed and comminuted distal clavicle fracture with displacement of the clavicle shaft     Fall and physical debility and Charcot foot deformity: PT OT evaluation.  Fall prevention.  PT OT recommend SNF     Type 2 diabetes: Monitor blood sugar and adjust insulin as needed.   Tradjenta     Atrial fibrillation: Eliquis anticoagulation.  Continue beta-blocker.  Continue home amiodarone.     Hyperlipidemia: Continue statin.  Stable.     Treatment plan discussed with patient who is in agreement.     DVT prophylaxis: Anticoagulant            Disposition: SNF    CODE STATUS:   Code Status and Medical Interventions: CPR (Attempt to Resuscitate); Full Support   Ordered at: 03/03/25 1734     Level Of Support Discussed With:    Patient     Code Status (Patient has no pulse and is not breathing):    CPR (Attempt to Resuscitate)     Medical Interventions (Patient has pulse or is breathing):    Full Support       Bishop Chakraborty MD  03/06/25

## 2025-03-06 NOTE — PLAN OF CARE
Goal Outcome Evaluation:  Plan of Care Reviewed With: patient        Progress: no change  Outcome Evaluation: Pt remains A/O X4, V/S stable, Pt currently on 1.5L nasal canula while awake and wearing home CPAP while sleeping. Pt reported mild tenderness on his legs bilaterally and continue to have ace wrapped as it helps to relief pain. Nephro and Urologist consultated. Pt placed on F/C due to urine retention. Flomax initiated, on heart healthy diet, Pt refused wound dressing care,diminished breath sounds bilaterally but has regular rhythum, 3plus edema noted on rt foot and 2 plus edema noted on Lft foot. Pt tolerated the scheduled medications and slept well over night.

## 2025-03-06 NOTE — PROGRESS NOTES
"      FOLLOW UP NOTE      Name: Rock Maciel Jr. ADMIT: 3/3/2025   : 1945  PCP: Denise Dickson APRN    MRN: [id number] LOS: 3 days   AGE/SEX: 79 y.o. male  ROOM: [facility]     Date of Service: 3/6/2025                           CHIEF COMPLIANTS / REASON FOR FOLLOW UP                Subjective:        Resting in bed.  Johnson placed with over 1 L urine output.  Does have some mild gross hematuria.  No dyspnea.       Review of Systems:     Negative except as above       OBJECTIVE                                                                        Exam:  /50 (BP Location: Left arm, Patient Position: Lying)   Pulse 70   Temp 99 °F (37.2 °C) (Oral)   Resp 18   Ht 185.4 cm (73\")   Wt 127 kg (279 lb 15.8 oz)   SpO2 97%   BMI 36.94 kg/m²   Intake/Output last 3 shifts:  I/O last 3 completed shifts:  In: 191 [P.O.:1200; I.V.:308; Blood:410]  Out:  [Urine:2350]  Intake/Output this shift:  No intake/output data recorded.    General Appearance: Chronically ill, pale appearing      Lungs:   Clear to auscultation bilaterally, respirations unlabored  Heart:  Regular rate and rhythm, S1 and S2 normal, no  rub   or gallop  Abdomen:  Soft, nontender  Extremities: Extremities wrapped, trace ankle edema  Neurologic:   Alert and oriented  Johnson with pink-tinged urine    Scheduled Meds:amiodarone, 200 mg, Oral, Daily  apixaban, 2.5 mg, Oral, BID  vitamin C, 250 mg, Oral, Daily  atorvastatin, 20 mg, Oral, Nightly  bumetanide, 1 mg, Oral, BID Diuretics  cholecalciferol, 1,000 Units, Oral, Daily  [Held by provider] clopidogrel, 75 mg, Oral, Daily  ferric gluconate, 250 mg, Intravenous, Daily  [Held by provider] hydrALAZINE, 25 mg, Oral, TID  insulin lispro, 2-9 Units, Subcutaneous, 4x Daily AC & at Bedtime  insulin lispro, 3 Units, Subcutaneous, TID With Meals  linagliptin, 5 mg, Oral, Daily  melatonin, 2.5 mg, Oral, Nightly  metoprolol succinate XL, 25 mg, Oral, Daily  pantoprazole, 40 mg, Oral, Q " AM  piperacillin-tazobactam, 3.375 g, Intravenous, Q8H  sodium chloride, 3 mL, Intravenous, Q12H  tamsulosin, 0.4 mg, Oral, Daily      Continuous Infusions:     PRN Meds:  acetaminophen **OR** acetaminophen **OR** acetaminophen    artificial tears    senna-docusate sodium **AND** polyethylene glycol **AND** bisacodyl **AND** bisacodyl    cadexomer iodine    dextrose    dextrose    famotidine    glucagon (human recombinant)    HYDROcodone-acetaminophen    Morphine **AND** naloxone    nitroglycerin    ondansetron ODT **OR** ondansetron    sodium chloride    sodium chloride         Data Review:                                                                           Labs reviewed        Imaging:                                                                           Radiology reviewed           ASSESSMENT:                                                                                Traumatic rhabdomyolysis    Arteriosclerosis of coronary artery    Essential hypertension    Type 2 diabetes mellitus with circulatory disorder, without long-term current use of insulin    Hyperlipidemia    Charcot-Davida-Tooth disease-like deformity of foot    Fall    S/P CABG x 2    KILLIAN (acute kidney injury)    Chronic diastolic (congestive) heart failure    Stage 3b chronic kidney disease    Chronic anticoagulation    Persistent atrial fibrillation    Closed displaced fracture of left clavicle    Pneumonia due to infectious organism    ATN (acute tubular necrosis)         KILLIAN: likely ATN related to rhabdomyolysis, prerenal insult related to diuretics etc. stabilizing with good urine output.     Rhabdomyolysis related to fall: nearly resolved.  Edema feet  CKD stage III-IV: Baseline creatinine  1.8-2.6 mg/dL with baseline GFR around 25 mL/min  Acute urinary retention requiring Johnson catheter placement.  Clavicle fracture  A-fib with controlled rates.  History of HFpEF with pulmonary hypertension: Slightly volume expanded.  Severe  anemia in CKD: TSAT 11%.  Cannot rule out occult GI bleed given anticoagulation.          PLAN:                                                                            Creatinine stable with good urine output.  Resume home Bumex 1 mg p.o. twice daily.  Johnson management per urology.  Continue IV iron.  EPO once iron replete.     D/W RN.      Tiesha Mccrary MD  [facility]  3/6/2025  11:49 EST

## 2025-03-06 NOTE — THERAPY EVALUATION
Patient Name: Rock Maciel Jr.  : 1945    MRN: [id number]                              Today's Date: 3/6/2025       Admit Date: 3/3/2025    Visit Dx:     ICD-10-CM ICD-9-CM   1. Traumatic rhabdomyolysis, initial encounter  T79.6XXA 958.6   2. Acute renal failure superimposed on chronic kidney disease, unspecified acute renal failure type, unspecified CKD stage  N17.9 584.9    N18.9 585.9   3. Sepsis, due to unspecified organism, unspecified whether acute organ dysfunction present  A41.9 038.9     995.91   4. Pneumonia due to infectious organism, unspecified laterality, unspecified part of lung  J18.9 486   5. Hyperglycemia  R73.9 790.29   6. Anemia, unspecified type  D64.9 285.9   7. Closed displaced fracture of acromial end of left clavicle, initial encounter  S42.032A 810.03     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    Chronic venous insufficiency    Nonrheumatic aortic valve stenosis    Carotid stenosis, asymptomatic    Bradycardia    Hyperlipidemia    Bilateral carotid artery disease    Abnormal cardiovascular stress test    H/O aortic valve replacement with porcine valve    Charcot-Davida-Tooth disease-like deformity of foot    Amputated toe of right foot    Fall    Non-traumatic rhabdomyolysis    S/P CABG x 2    CKD (chronic kidney disease) stage 3, GFR 30-59 ml/min    Rupture of left quadriceps tendon    Constipation    KILLIAN (acute kidney injury)    Wound of right leg    Anemia    Chronic diastolic (congestive) heart failure    Amputated toe of left foot    Gas gangrene    Cellulitis of left lower limb     Acquired absence of left foot    Bilateral lower extremity edema    Stage 3b chronic kidney disease    History of pneumonia    Atelectasis of both lungs    Abnormal pleural fluid    Pleural thickening    Atrial fibrillation, persistent    Chronic anticoagulation    Persistent atrial fibrillation    Bladder wall thickening    Hematuria    CKD (chronic kidney disease) stage 4, GFR 15-29 ml/min    Hypoxemia    Nocturnal hypoxemia    Status post left inguinal hernia repair, follow-up exam    Weakness of both lower extremities    Unsteady gait when walking    Wound, open, arm, forearm, right, subsequent encounter    Traumatic rhabdomyolysis    Closed displaced fracture of left clavicle    Pneumonia due to infectious organism    ATN (acute tubular necrosis)     Past Medical History:   Diagnosis Date    Allergic rhinitis     Bronchitis     Coronary artery disease     Diabetes mellitus 2001    DM (diabetes mellitus)     Encounter for annual health examination 05/06/2014    Annual Health Assessment    Gout     Hiatal hernia     History of MRSA infection     RIGHT FOOT 2010    Hyperlipidemia     Hypertension     Murmur     Pneumothorax on right     Sleep apnea     pt wears CPAP at night    Wellness examination 06/24/2015    Annual Wellness Visit     Past Surgical History:   Procedure Laterality Date    ARTERY SURGERY Bilateral     carotid    CARDIAC CATHETERIZATION N/A 7/20/2018    Procedure: Left Heart Cath;  Surgeon: Jo Toney MD;  Location:  [facility];  Service: Cardiovascular    CARDIAC CATHETERIZATION N/A 7/20/2018    Procedure: Coronary angiography;  Surgeon: Jo Toney MD;  Location:  [facility];  Service: Cardiovascular    CAROTID ENDARTERECTOMY Bilateral     COLONOSCOPY  2008    CORONARY ARTERY BYPASS GRAFT WITH AORTIC VALVE REPAIR/REPLACEMENT N/A 7/23/2018    Procedure: INTRAOPERATIVE ALEX, MIDLINE STERNOTOMY, CORONARY ARTERY BYPASS GRAFTING X 3 USING  ENDOSCOPICALLY HARVESTED LEFT GREATER SAPHENOUS VEIN,  AORTIC VALVE REPLACEMENT USING 25MM LOPEZ II ULTRA PORCINE VALVE, PRP;  Surgeon: Phong Posey MD;  Location: [facility] MAIN OR;  Service: Cardiothoracic    FOOT SURGERY Right 2010    5th digit removal    FOOT SURGERY Left 2011    1 digit removed    INCISION AND DRAINAGE LEG Right 3/28/2020    Procedure: DEBRIDMENT OF RIGHT CALF;  Surgeon: Ross Pineda MD;  Location: [facility] MAIN OR;  Service: Vascular;  Laterality: Right;    INGUINAL HERNIA REPAIR Left 11/29/2024    Procedure: INGUINAL HERNIA REPAIR LAPAROSCOPIC WITH DAVINCI ROBOT;  Surgeon: Phong Lazo MD;  Location: [facility] MAIN OR;  Service: Robotics - DaVinci;  Laterality: Left;    QUADRICEPS TENDON REPAIR Left 5/14/2019    Procedure: Left QUADRICEPS TENDON REPAIR;  Surgeon: Camilo Hunter MD;  Location: [facility] MAIN OR;  Service: Orthopedics    THORACENTESIS Right 11/21/2016    THORACOSCOPY Right 5/8/2017    Procedure: BRONCHOSCOPY, RIGHT VAT,  TOTAL DECORTICATION RIGHT LUNG, PLEURAL BX, PLACEMENT SUBPLEURAL PAIN CAATHETERS X2;  Surgeon: Donald Orlando III, MD;  Location: [facility] OR;  Service:     TONSILECTOMY, ADENOIDECTOMY, BILATERAL MYRINGOTOMY AND TUBES        General Information       Row Name 03/06/25 1307          Physical Therapy Time and Intention    Document Type evaluation  -     Mode of Treatment individual therapy;physical therapy  -       Row Name 03/06/25 1307          General Information    Patient Profile Reviewed yes  -     Prior Level of Function independent:  -     Existing Precautions/Restrictions fall;oxygen therapy device and L/min  -     Barriers to Rehab medically complex;previous functional deficit  -       Row Name 03/06/25 1307          Living Environment    People in Home alone  -       Row Name 03/06/25 1307          Home Main Entrance    Number of Stairs, Main Entrance three  -     Stair Railings, Main Entrance none  -        Row Name 03/06/25 1307          Stairs Within Home, Primary    Number of Stairs, Within Home, Primary none;other (see comments)  uses chair lift to bypass chairs inside down to basement and to the top floor  -     Stair Railings, Within Home, Primary railings safe and in good condition  -       Row Name 03/06/25 1307          Cognition    Orientation Status (Cognition) oriented x 3  -       Row Name 03/06/25 1307          Safety Issues/Impairments Affecting Functional Mobility    Impairments Affecting Function (Mobility) endurance/activity tolerance;shortness of breath;strength;pain;postural/trunk control  -     Comment, Safety Issues/Impairments (Mobility) High levels of pain upon movement restrict mobility  -               User Key  (r) = Recorded By, (t) = Taken By, (c) = Cosigned By      Initials Name Provider Type    Jd Buckner, PT Physical Therapist                   Mobility       Row Name 03/06/25 1309          Bed Mobility    Bed Mobility supine-sit  -     Supine-Sit [place] (Bed Mobility) maximum assist (25% patient effort);1 person assist  -     Comment, (Bed Mobility) Unable to fully complete supine to sit transfer to the EOB  -       Row Name 03/06/25 1309          Bed-Chair Transfer    Bed-Chair [place] (Transfers) not tested  -       Row Name 03/06/25 1309          Sit-Stand Transfer    Sit-Stand [place] (Transfers) not tested  -       Row Name 03/06/25 1309          Gait/Stairs (Locomotion)    [place] Level (Gait) not tested  -     Patient was able to Ambulate no, other medical factors prevent ambulation  -     Reason Patient was unable to Ambulate Uncontrolled Pain;Excessive Weakness  -               User Key  (r) = Recorded By, (t) = Taken By, (c) = Cosigned By      Initials Name Provider Type    Jd Buckner, PT Physical Therapist                   Obj/Interventions       Row Name 03/06/25 1310          Range of Motion Comprehensive     General Range of Motion bilateral lower extremity ROM WFL;bilateral upper extremity ROM WFL  -       Row Name 03/06/25 1310          Strength Comprehensive (MMT)    Comment, General Manual Muscle Testing (MMT) Assessment Grossly 2+ - 3/5 BLE  -       Row Name 03/06/25 1310          Motor Skills    Therapeutic Exercise other (see comments)  heel slides and quad sets supine in bed  -               User Key  (r) = Recorded By, (t) = Taken By, (c) = Cosigned By      Initials Name Provider Type     Jd Zapata, PT Physical Therapist                   Goals/Plan       Row Name 03/06/25 1312          Bed Mobility Goal 1 (PT)    Activity/Assistive Device (Bed Mobility Goal 1, PT) bed mobility activities, all  -MH     [place] Level/Cues Needed (Bed Mobility Goal 1, PT) minimum assist (75% or more patient effort)  -     Time Frame (Bed Mobility Goal 1, PT) 10 days  -     Progress/Outcomes (Bed Mobility Goal 1, PT) new goal  -       Row Name 03/06/25 1312          Transfer Goal 1 (PT)    Activity/Assistive Device (Transfer Goal 1, PT) transfers, all  -MH     [place] Level/Cues Needed (Transfer Goal 1, PT) minimum assist (75% or more patient effort)  -     Time Frame (Transfer Goal 1, PT) short term goal (STG);10 days  -     Progress/Outcome (Transfer Goal 1, PT) new goal  -       Row Name 03/06/25 1312          Gait Training Goal 1 (PT)    Activity/Assistive Device (Gait Training Goal 1, PT) gait (walking locomotion)  -     [place] Level (Gait Training Goal 1, PT) contact guard required  -     Distance (Gait Training Goal 1, PT) 25  -     Time Frame (Gait Training Goal 1, PT) short term goal (STG);1 week  -     Progress/Outcome (Gait Training Goal 1, PT) new goal  -       Row Name 03/06/25 1312          Balance Goal 1 (PT)    Activity/Assistive Device (Balance Goal) standing static balance;standing dynamic balance  -     [place] Level/Cues Needed (Balance Goal 1, PT)  supervision required  -     Time Frame (Balance Goal 1, PT) short-term goal (STG);2 weeks  -       Row Name 03/06/25 1312          Therapy Assessment/Plan (PT)    Planned Therapy Interventions (PT) balance training;bed mobility training;gait training;home exercise program;motor coordination training;stair training;ROM (range of motion);postural re-education;patient/family education;neuromuscular re-education;stretching;strengthening;transfer training  -               User Key  (r) = Recorded By, (t) = Taken By, (c) = Cosigned By      Initials Name Provider Type     Jd Zapata, PT Physical Therapist                   Clinical Impression       Row Name 03/06/25 1311          Pain    Pretreatment Pain Rating 5/10  -     Posttreatment Pain Rating 5/10  -     Pain Location extremity  -     Pain Side/Orientation generalized;bilateral  -       Row Name 03/06/25 1311          Plan of Care Review    Plan of Care Reviewed With patient  -     Progress no change  -     Outcome Evaluation Pt is a 79 y.o. male admitted to [facility] with c/o of a fall with closed head injury and left shoulder pain on 3/3/2025. Work up reveals traumatic rhabdomyolysis and L distal clavicle fracture. PMHx includes HTN, DM, charcot freeman tooth disease, stage 3 kidney disease, a-fib and heart block. Prior to hospital admission, pt reports residing in house alone and 3 DO to enter the house. Pt also has a upstairs and downstairs that he uses a chair lift to reach which is how he feel this most recent time when going up the stairs. Prior to hospital stay, pt was I with ADLs and utilized walker AD at baseline. Pt required max A x 1 to attempt to sit to the edge of the bed for bed mobility. Pt is unable to complete with 1 person assist on this date and has high levels of pain when attempting to transfer and is took weak to assist and attempt further transfers today.  Pt presents today with below baseline strength, dec endurance, and  decreased functional mobility. Pt will benefit from skilled PT to address the previous deficits and improve overall safety with functional mobility. Anticipate pt will D/C to SNF pending progress.  -       Row Name 03/06/25 1311          Therapy Assessment/Plan (PT)    Patient/Family Therapy Goals Statement (PT) return home  -     Rehab Potential (PT) fair  -     Criteria for Skilled Interventions Met (PT) yes  -     Therapy Frequency (PT) 5 times/wk  -       Row Name 03/06/25 1311          Vital Signs    O2 Delivery Pre Treatment supplemental O2  -     O2 Delivery Intra Treatment supplemental O2  -     O2 Delivery Post Treatment supplemental O2  -MH     Pre Patient Position Supine  -MH     Intra Patient Position Supine  -MH     Post Patient Position Supine  -       Row Name 03/06/25 1311          Positioning and Restraints    Pre-Treatment Position in bed  -     Post Treatment Position bed  -MH     In Bed notified nsg;exit alarm on;call light within reach;encouraged to call for assist;with other staff  -               User Key  (r) = Recorded By, (t) = Taken By, (c) = Cosigned By      Initials Name Provider Type     Jd Zapata, PT Physical Therapist                   Outcome Measures       Row Name 03/06/25 1312          How much help from another person do you currently need...    Turning from your back to your side while in flat bed without using bedrails? 2  -MH     Moving from lying on back to sitting on the side of a flat bed without bedrails? 2  -MH     Moving to and from a bed to a chair (including a wheelchair)? 2  -MH     Standing up from a chair using your arms (e.g., wheelchair, bedside chair)? 1  -MH     Climbing 3-5 steps with a railing? 1  -MH     To walk in hospital room? 1  -MH     AM-PAC 6 Clicks Score (PT) 9  -     Highest Level of Mobility Goal 3 --> Sit at edge of bed  -       Row Name 03/06/25 1312          Functional Assessment    Outcome Measure Options AM-PAC 6  Clicks Basic Mobility (PT)  -               User Key  (r) = Recorded By, (t) = Taken By, (c) = Cosigned By      Initials Name Provider Type     Jd Zapata PT Physical Therapist                                 Physical Therapy Education       Title: PT OT SLP Therapies (In Progress)       Topic: Physical Therapy (Done)       Point: Mobility training (Done)       Learning Progress Summary            Patient Acceptance, E, VU,NR by  at 3/6/2025 1313                      Point: Home exercise program (Done)       Learning Progress Summary            Patient Acceptance, E, VU,NR by  at 3/6/2025 1313                      Point: Body mechanics (Done)       Learning Progress Summary            Patient Acceptance, E, VU,NR by  at 3/6/2025 1313                      Point: Precautions (Done)       Learning Progress Summary            Patient Acceptance, E, VU,NR by  at 3/6/2025 1313                                      User Key       Initials Effective Dates Name Provider Type [facility] 09/11/24 -  Jd Zapata, REGAN Physical Therapist PT                  PT Recommendation and Plan  Planned Therapy Interventions (PT): balance training, bed mobility training, gait training, home exercise program, motor coordination training, stair training, ROM (range of motion), postural re-education, patient/family education, neuromuscular re-education, stretching, strengthening, transfer training  Progress: no change  Outcome Evaluation: Pt is a 79 y.o. male admitted to [facility] with c/o of a fall with closed head injury and left shoulder pain on 3/3/2025. Work up reveals traumatic rhabdomyolysis and L distal clavicle fracture. PMHx includes HTN, DM, charcot freeman tooth disease, stage 3 kidney disease, a-fib and heart block. Prior to hospital admission, pt reports residing in house alone and 3 DO to enter the house. Pt also has a upstairs and downstairs that he uses a chair lift to reach which is how he feel this most  recent time when going up the stairs. Prior to hospital stay, pt was I with ADLs and utilized walker AD at baseline. Pt required max A x 1 to attempt to sit to the edge of the bed for bed mobility. Pt is unable to complete with 1 person assist on this date and has high levels of pain when attempting to transfer and is took weak to assist and attempt further transfers today.  Pt presents today with below baseline strength, dec endurance, and decreased functional mobility. Pt will benefit from skilled PT to address the previous deficits and improve overall safety with functional mobility. Anticipate pt will D/C to SNF pending progress.     Time Calculation:         PT Charges       Row Name 03/06/25 1307             Time Calculation    Start Time 0930  -      Stop Time 0946  -      Time Calculation (min) 16 min  -MH      PT Received On 03/06/25  -      PT - Next Appointment 03/07/25  -      PT Goal Re-Cert Due Date 03/13/25  -         Time Calculation- PT    Total Timed Code Minutes- PT 10 minute(s)  -MH         Timed Charges    38307 - PT Therapeutic Activity Minutes 10  -MH         Untimed Charges    PT Eval/Re-eval Minutes 6  -MH         Total Minutes    Timed Charges Total Minutes 10  -MH      Untimed Charges Total Minutes 6  -MH       Total Minutes 16  -MH                User Key  (r) = Recorded By, (t) = Taken By, (c) = Cosigned By      Initials Name Provider Type     Jd Zapata, PT Physical Therapist                  Therapy Charges for Today       Code Description Service Date Service Provider Modifiers Qty    [id number]  PT THERAPEUTIC ACT EA 15 MIN 3/6/2025 Jd Zapata, PT GP 1    [id number] HC PT EVAL MOD COMPLEXITY 2 3/6/2025 Jd Zapata, PT GP 1            PT G-Codes  Outcome Measure Options: AM-PAC 6 Clicks Basic Mobility (PT)  AM-PAC 6 Clicks Score (PT): 9  PT Discharge Summary  Anticipated Discharge Disposition (PT): skilled nursing facility    Jd Zapata PT  3/6/2025

## 2025-03-06 NOTE — THERAPY EVALUATION
Patient Name: Rock Maciel Jr.  : 1945    MRN: [id number]                              Today's Date: 3/6/2025       Admit Date: 3/3/2025    Visit Dx:     ICD-10-CM ICD-9-CM   1. Traumatic rhabdomyolysis, initial encounter  T79.6XXA 958.6   2. Acute renal failure superimposed on chronic kidney disease, unspecified acute renal failure type, unspecified CKD stage  N17.9 584.9    N18.9 585.9   3. Sepsis, due to unspecified organism, unspecified whether acute organ dysfunction present  A41.9 038.9     995.91   4. Pneumonia due to infectious organism, unspecified laterality, unspecified part of lung  J18.9 486   5. Hyperglycemia  R73.9 790.29   6. Anemia, unspecified type  D64.9 285.9   7. Closed displaced fracture of acromial end of left clavicle, initial encounter  S42.032A 810.03   8. Decreased activities of daily living (ADL)  Z78.9 V49.89     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    Chronic venous insufficiency    Nonrheumatic aortic valve stenosis    Carotid stenosis, asymptomatic    Bradycardia    Hyperlipidemia    Bilateral carotid artery disease    Abnormal cardiovascular stress test    H/O aortic valve replacement with porcine valve    Charcot-Davida-Tooth disease-like deformity of foot    Amputated toe of right foot    Fall    Non-traumatic rhabdomyolysis    S/P CABG x 2    CKD (chronic kidney disease) stage 3, GFR 30-59 ml/min    Rupture of left quadriceps tendon    Constipation    KILLIAN (acute kidney injury)    Wound of right leg    Anemia    Chronic diastolic (congestive) heart failure    Amputated toe  of left foot    Gas gangrene    Cellulitis of left lower limb    Acquired absence of left foot    Bilateral lower extremity edema    Stage 3b chronic kidney disease    History of pneumonia    Atelectasis of both lungs    Abnormal pleural fluid    Pleural thickening    Atrial fibrillation, persistent    Chronic anticoagulation    Persistent atrial fibrillation    Bladder wall thickening    Hematuria    CKD (chronic kidney disease) stage 4, GFR 15-29 ml/min    Hypoxemia    Nocturnal hypoxemia    Status post left inguinal hernia repair, follow-up exam    Weakness of both lower extremities    Unsteady gait when walking    Wound, open, arm, forearm, right, subsequent encounter    Traumatic rhabdomyolysis    Closed displaced fracture of left clavicle    Pneumonia due to infectious organism    ATN (acute tubular necrosis)     Past Medical History:   Diagnosis Date    Allergic rhinitis     Bronchitis     Coronary artery disease     Diabetes mellitus 2001    DM (diabetes mellitus)     Encounter for annual health examination 05/06/2014    Annual Health Assessment    Gout     Hiatal hernia     History of MRSA infection     RIGHT FOOT 2010    Hyperlipidemia     Hypertension     Murmur     Pneumothorax on right     Sleep apnea     pt wears CPAP at night    Wellness examination 06/24/2015    Annual Wellness Visit     Past Surgical History:   Procedure Laterality Date    ARTERY SURGERY Bilateral     carotid    CARDIAC CATHETERIZATION N/A 7/20/2018    Procedure: Left Heart Cath;  Surgeon: Jo Toney MD;  Location:  [facility];  Service: Cardiovascular    CARDIAC CATHETERIZATION N/A 7/20/2018    Procedure: Coronary angiography;  Surgeon: Jo Toney MD;  Location:  [facility];  Service: Cardiovascular    CAROTID ENDARTERECTOMY Bilateral     COLONOSCOPY  2008    CORONARY ARTERY BYPASS GRAFT WITH AORTIC VALVE REPAIR/REPLACEMENT N/A 7/23/2018    Procedure: INTRAOPERATIVE ALEX,  MIDLINE STERNOTOMY, CORONARY ARTERY BYPASS GRAFTING X 3 USING ENDOSCOPICALLY HARVESTED LEFT GREATER SAPHENOUS VEIN,  AORTIC VALVE REPLACEMENT USING 25MM LOPEZ II ULTRA PORCINE VALVE, PRP;  Surgeon: Phong Posey MD;  Location: [facility] MAIN OR;  Service: Cardiothoracic    FOOT SURGERY Right 2010    5th digit removal    FOOT SURGERY Left 2011    1 digit removed    INCISION AND DRAINAGE LEG Right 3/28/2020    Procedure: DEBRIDMENT OF RIGHT CALF;  Surgeon: Ross Pineda MD;  Location: [facility] MAIN OR;  Service: Vascular;  Laterality: Right;    INGUINAL HERNIA REPAIR Left 11/29/2024    Procedure: INGUINAL HERNIA REPAIR LAPAROSCOPIC WITH DAVINCI ROBOT;  Surgeon: Phong Lazo MD;  Location: [facility] OR;  Service: Robotics - DaVinci;  Laterality: Left;    QUADRICEPS TENDON REPAIR Left 5/14/2019    Procedure: Left QUADRICEPS TENDON REPAIR;  Surgeon: Camilo Hunter MD;  Location: [facility] OR;  Service: Orthopedics    THORACENTESIS Right 11/21/2016    THORACOSCOPY Right 5/8/2017    Procedure: BRONCHOSCOPY, RIGHT VAT,  TOTAL DECORTICATION RIGHT LUNG, PLEURAL BX, PLACEMENT SUBPLEURAL PAIN CAATHETERS X2;  Surgeon: Donald Orlando III, MD;  Location: [facility] OR;  Service:     TONSILECTOMY, ADENOIDECTOMY, BILATERAL MYRINGOTOMY AND TUBES        General Information       Row Name 03/06/25 1541          OT Time and Intention    Document Type evaluation  -ES     Mode of Treatment individual therapy;occupational therapy  -ES     Patient Effort adequate  -ES       Row Name 03/06/25 1540          General Information    Patient Profile Reviewed yes  -ES     Prior Level of Function independent:;ADL's;all household mobility;driving  Patient independent with B and IADLs at baseline. RW for functional mobility. Tub/shower with shower chair and grab bars. No home O2 use.  -ES     Existing Precautions/Restrictions fall;oxygen therapy device and L/min;shoulder  NWB LUE  -ES     Barriers to Rehab previous  functional deficit;medically complex  -ES       Row Name 03/06/25 1541          Living Environment    People in Home alone  -ES       Row Name 03/06/25 1541          Home Main Entrance    Number of Stairs, Main Entrance two  -ES       Row Name 03/06/25 1541          Stairs Within Home, Primary    Stairs, Within Home, Primary patient lives in 2 story home. Flight of stairs to basement and second story. Patient has chair lift in home for stair navigation  -ES       Row Name 03/06/25 1541          Cognition    Orientation Status (Cognition) oriented x 3  -ES       Row Name 03/06/25 1541          Safety Issues/Impairments Affecting Functional Mobility    Impairments Affecting Function (Mobility) endurance/activity tolerance;shortness of breath;strength;pain;postural/trunk control;range of motion (ROM)  -ES               User Key  (r) = Recorded By, (t) = Taken By, (c) = Cosigned By      Initials Name Provider Type    ES Lillie Isidro, OTR/L, CSRS Occupational Therapist                     Mobility/ADL's       Row Name 03/06/25 1544          Bed Mobility    Bed Mobility supine-sit  -ES     Supine-Sit Kimble (Bed Mobility) maximum assist (25% patient effort);1 person assist  -ES     Comment, (Bed Mobility) pain limits patient participation in transfers.  -ES       Row Name 03/06/25 1544          Transfers    Comment, (Transfers) unable to assess secondary to pain  -ES       Row Name 03/06/25 1544          Functional Mobility    Functional Mobility- Ind. Level not tested  -ES       Row Name 03/06/25 1544          Activities of Daily Living    BADL Assessment/Intervention lower body dressing  -ES       Row Name 03/06/25 1544          Mobility    Extremity Weight-bearing Status left upper extremity  -ES     Left Upper Extremity (Weight-bearing Status) non weight-bearing (NWB)  -ES       Row Name 03/06/25 1544          Lower Body Dressing Assessment/Training    Kimble Level (Lower Body Dressing) lower body  dressing skills;don;socks;maximum assist (25% patient effort)  -ES               User Key  (r) = Recorded By, (t) = Taken By, (c) = Cosigned By      Initials Name Provider Type    ES Lillie Isidro OTR/L, LEANDER Occupational Therapist                   Obj/Interventions       Row Name 03/06/25 1544          Sensory Assessment (Somatosensory)    Sensory Assessment (Somatosensory) sensation intact  -ES       Row Name 03/06/25 1544          Vision Assessment/Intervention    Visual Impairment/Limitations WFL  -ES       Row Name 03/06/25 1544          Range of Motion Comprehensive    General Range of Motion upper extremity range of motion deficits identified  -ES     Comment, General Range of Motion LUE AROM deferred. RUE AROM WFL.  -ES       Row Name 03/06/25 1544          Strength Comprehensive (MMT)    General Manual Muscle Testing (MMT) Assessment upper extremity strength deficits identified;lower extremity strength deficits identified  -ES     Comment, General Manual Muscle Testing (MMT) Assessment generalized weakness  -ES       Row Name 03/06/25 1544          Upper Extremity (Manual Muscle Testing)    Comment, MMT: Upper Extremity L grasp 3+/5, further testing deferred. RUE grossly 4/5  -ES       Row Name 03/06/25 1544          Motor Skills    Motor Skills functional endurance  -ES     Functional Endurance fair minus. Pain limiting at time of evaluation  -ES       Row Name 03/06/25 1544          Balance    Balance Assessment sitting static balance  -ES     Static Sitting Balance minimal assist  -ES     Position, Sitting Balance sitting edge of bed  -ES               User Key  (r) = Recorded By, (t) = Taken By, (c) = Cosigned By      Initials Name Provider Type    Lillie Connors OTR/L, CSRS Occupational Therapist                   Goals/Plan       Row Name 03/06/25 1552          Bed Mobility Goal 1 (OT)    Activity/Assistive Device (Bed Mobility Goal 1, OT) bed mobility activities, all  -ES     Eure  Level/Cues Needed (Bed Mobility Goal 1, OT) minimum assist (75% or more patient effort)  -ES     Time Frame (Bed Mobility Goal 1, OT) short term goal (STG);2 weeks  -ES     Progress/Outcomes (Bed Mobility Goal 1, OT) new goal  -ES       Row Name 03/06/25 1552          Transfer Goal 1 (OT)    Activity/Assistive Device (Transfer Goal 1, OT) transfers, all  -ES     Pender Level/Cues Needed (Transfer Goal 1, OT) minimum assist (75% or more patient effort)  -ES     Time Frame (Transfer Goal 1, OT) short term goal (STG);2 weeks  -ES     Progress/Outcome (Transfer Goal 1, OT) new goal  -ES       Row Name 03/06/25 1552          Bathing Goal 1 (OT)    Activity/Device (Bathing Goal 1, OT) bathing skills, all  -ES     Pender Level/Cues Needed (Bathing Goal 1, OT) minimum assist (75% or more patient effort)  -ES     Time Frame (Bathing Goal 1, OT) short term goal (STG);2 weeks  -ES     Progress/Outcomes (Bathing Goal 1, OT) new goal  -ES       Row Name 03/06/25 1552          Dressing Goal 1 (OT)    Activity/Device (Dressing Goal 1, OT) dressing skills, all  -ES     Pender/Cues Needed (Dressing Goal 1, OT) minimum assist (75% or more patient effort)  -ES     Time Frame (Dressing Goal 1, OT) short term goal (STG);2 weeks  -ES     Progress/Outcome (Dressing Goal 1, OT) new goal  -ES       Row Name 03/06/25 1552          Toileting Goal 1 (OT)    Activity/Device (Toileting Goal 1, OT) toileting skills, all  -ES     Pender Level/Cues Needed (Toileting Goal 1, OT) minimum assist (75% or more patient effort)  -ES     Time Frame (Toileting Goal 1, OT) short term goal (STG);2 weeks  -ES     Progress/Outcome (Toileting Goal 1, OT) new goal  -ES       Row Name 03/06/25 1552          Grooming Goal 1 (OT)    Activity/Device (Grooming Goal 1, OT) grooming skills, all  -ES     Pender (Grooming Goal 1, OT) set-up required  -ES     Time Frame (Grooming Goal 1, OT) short term goal (STG);2 weeks  -ES      Progress/Outcome (Grooming Goal 1, OT) new goal  -ES       Row Name 03/06/25 1552          Therapy Assessment/Plan (OT)    Planned Therapy Interventions (OT) activity tolerance training;BADL retraining;functional balance retraining;patient/caregiver education/training;ROM/therapeutic exercise;strengthening exercise;transfer/mobility retraining  -ES               User Key  (r) = Recorded By, (t) = Taken By, (c) = Cosigned By      Initials Name Provider Type    ES Lillie Isidro, OTR/L, CSRS Occupational Therapist                   Clinical Impression       Row Name 03/06/25 1544          Pain Assessment    Pre/Posttreatment Pain Comment patient with complaints of L clavicle pain, does not assign numerical rating to pain during evaluation  -ES       Row Name 03/06/25 1549          Plan of Care Review    Plan of Care Reviewed With patient  -ES     Outcome Evaluation Patient is 79 year old male presenting to [facility] on 3/3/2025 with complaints of mechanical fall and closed head injury along with L shoulder region pain. Patient with L distal clavicle fracture, ortho ordering NWB LUE. Workup also revealed traumatic rhabdomyolysis. At baseline, patient is independent with B and IADL engagement, RW for functional mobility and no home O2 use. Today, patient is experiencing pain with all movement, limiting patient participation in OT evaluation. Patient requires max A with bed mobility, is unable to tolerate static sitting EOB requesting return to supine. Patient is unable to complete transfers or mobility this session. OT provided education on NWB LUE and applied ice for patient to decrease swelling, education provided on ice schedule. Patient presents with deficits related to strenght, tolerance, balance, transfers and mobility that impede patient independence with ADLs and mobility. Patient will benefit from skilled OT intervention in acute care setting to address deficits. Recommend IPR v SNF at time of  discharge.  -ES       Row Name 03/06/25 1546          Therapy Assessment/Plan (OT)    Rehab Potential (OT) good  -ES     Criteria for Skilled Therapeutic Interventions Met (OT) yes;meets criteria;skilled treatment is necessary  -ES     Therapy Frequency (OT) 5 times/wk  -ES       Row Name 03/06/25 1546          Therapy Plan Review/Discharge Plan (OT)    Anticipated Discharge Disposition (OT) inpatient rehabilitation facility  -ES       Row Name 03/06/25 1546          Positioning and Restraints    Pre-Treatment Position in bed  -ES     Post Treatment Position bed  -ES     In Bed fowlers;call light within reach;encouraged to call for assist;exit alarm on  -ES               User Key  (r) = Recorded By, (t) = Taken By, (c) = Cosigned By      Initials Name Provider Type    Lillie Connors, OTR/L, CSRS Occupational Therapist                   Outcome Measures       Row Name 03/06/25 1553          How much help from another is currently needed...    Putting on and taking off regular lower body clothing? 2  -ES     Bathing (including washing, rinsing, and drying) 2  -ES     Toileting (which includes using toilet bed pan or urinal) 1  -ES     Putting on and taking off regular upper body clothing 2  -ES     Taking care of personal grooming (such as brushing teeth) 3  -ES     Eating meals 4  -ES     AM-PAC 6 Clicks Score (OT) 14  -ES       Row Name 03/06/25 1312          How much help from another person do you currently need...    Turning from your back to your side while in flat bed without using bedrails? 2  -MH     Moving from lying on back to sitting on the side of a flat bed without bedrails? 2  -MH     Moving to and from a bed to a chair (including a wheelchair)? 2  -MH     Standing up from a chair using your arms (e.g., wheelchair, bedside chair)? 1  -MH     Climbing 3-5 steps with a railing? 1  -MH     To walk in hospital room? 1  -MH     AM-PAC 6 Clicks Score (PT) 9  -MH     Highest Level of Mobility Goal 3 --> Sit  at edge of bed  -       Row Name 03/06/25 1553 03/06/25 1312       Functional Assessment    Outcome Measure Options AM-PAC 6 Clicks Daily Activity (OT)  - AM-PAC 6 Clicks Basic Mobility (PT)  -              User Key  (r) = Recorded By, (t) = Taken By, (c) = Cosigned By      Initials Name Provider Type    Lillie Connors, OTR/L, CSRS Occupational Therapist     Jd Zapata, PT Physical Therapist                    Occupational Therapy Education       Title: PT OT SLP Therapies (In Progress)       Topic: Occupational Therapy (Not Started)       Point: ADL training (Not Started)       Description:   Instruct learner(s) on proper safety adaptation and remediation techniques during self care or transfers.   Instruct in proper use of assistive devices.                  Learner Progress:  Not documented in this visit.              Point: Home exercise program (Not Started)       Description:   Instruct learner(s) on appropriate technique for monitoring, assisting and/or progressing therapeutic exercises/activities.                  Learner Progress:  Not documented in this visit.              Point: Precautions (Not Started)       Description:   Instruct learner(s) on prescribed precautions during self-care and functional transfers.                  Learner Progress:  Not documented in this visit.              Point: Body mechanics (Not Started)       Description:   Instruct learner(s) on proper positioning and spine alignment during self-care, functional mobility activities and/or exercises.                  Learner Progress:  Not documented in this visit.                                  OT Recommendation and Plan  Planned Therapy Interventions (OT): activity tolerance training, BADL retraining, functional balance retraining, patient/caregiver education/training, ROM/therapeutic exercise, strengthening exercise, transfer/mobility retraining  Therapy Frequency (OT): 5 times/wk  Plan of Care Review  Plan of Care  Reviewed With: patient  Outcome Evaluation: Patient is 79 year old male presenting to [facility] on 3/3/2025 with complaints of mechanical fall and closed head injury along with L shoulder region pain. Patient with L distal clavicle fracture, ortho ordering NWB LUE. Workup also revealed traumatic rhabdomyolysis. At baseline, patient is independent with B and IADL engagement, RW for functional mobility and no home O2 use. Today, patient is experiencing pain with all movement, limiting patient participation in OT evaluation. Patient requires max A with bed mobility, is unable to tolerate static sitting EOB requesting return to supine. Patient is unable to complete transfers or mobility this session. OT provided education on NWB LUE and applied ice for patient to decrease swelling, education provided on ice schedule. Patient presents with deficits related to strenght, tolerance, balance, transfers and mobility that impede patient independence with ADLs and mobility. Patient will benefit from skilled OT intervention in acute care setting to address deficits. Recommend IPR v SNF at time of discharge.     Time Calculation:   Evaluation Complexity (OT)  Review Occupational Profile/Medical/Therapy History Complexity: expanded/moderate complexity  Assessment, Occupational Performance/Identification of Deficit Complexity: 3-5 performance deficits  Clinical Decision Making Complexity (OT): detailed assessment/moderate complexity  Overall Complexity of Evaluation (OT): moderate complexity     Time Calculation- OT       Row Name 03/06/25 1554             Time Calculation- OT    OT Start Time 1426  -ES      OT Stop Time 1447  -ES      OT Time Calculation (min) 21 min  -ES      Total Timed Code Minutes- OT 10 minute(s)  -ES      OT Received On 03/06/25  -ES      OT - Next Appointment 03/07/25  -ES      OT Goal Re-Cert Due Date 03/20/25  -ES         Timed Charges    21625 - OT Therapeutic Activity Minutes 10  -ES          Untimed Charges    OT Eval/Re-eval Minutes 11  -ES         Total Minutes    Timed Charges Total Minutes 10  -ES      Untimed Charges Total Minutes 11  -ES       Total Minutes 21  -ES                User Key  (r) = Recorded By, (t) = Taken By, (c) = Cosigned By      Initials Name Provider Type    ES Lillie Isidro, OTR/L, CSRS Occupational Therapist                  Therapy Charges for Today       Code Description Service Date Service Provider Modifiers Qty    48300454934  OT THERAPEUTIC ACT EA 15 MIN 3/6/2025 Lillie Isidro OTR/L, CSRS GO 1    [id number] HC OT EVAL MOD COMPLEXITY 2 3/6/2025 Lillie Isidro OTR/L, CSRS GO 1                 CORTEZ Prajapati/L, CSRS  3/6/2025

## 2025-03-06 NOTE — PLAN OF CARE
Goal Outcome Evaluation:   Pt is being treated for traumatic rhabdomyolysis. Vital signs are stable. Worked with therapy. Started Bumex per MD order. Restarted hydralazine per MD order. MRI of the right foot ordered- screening sheet and consent signed. Pt went down for CT of the right foot. Care plan ongoing.         Progress: no change

## 2025-03-06 NOTE — PLAN OF CARE
Goal Outcome Evaluation:  Plan of Care Reviewed With: patient           Outcome Evaluation: Patient is 79 year old male presenting to [facility] on 3/3/2025 with complaints of mechanical fall and closed head injury along with L shoulder region pain. Patient with L distal clavicle fracture, ortho ordering NWB LUE. Workup also revealed traumatic rhabdomyolysis. At baseline, patient is independent with B and IADL engagement, RW for functional mobility and no home O2 use. Today, patient is experiencing pain with all movement, limiting patient participation in OT evaluation. Patient requires max A with bed mobility, is unable to tolerate static sitting EOB requesting return to supine. Patient is unable to complete transfers or mobility this session. OT provided education on NWB LUE and applied ice for patient to decrease swelling, education provided on ice schedule. Patient presents with deficits related to strenght, tolerance, balance, transfers and mobility that impede patient independence with ADLs and mobility. Patient will benefit from skilled OT intervention in acute care setting to address deficits. Recommend IPR v SNF at time of discharge.    Anticipated Discharge Disposition (OT): inpatient rehabilitation facility

## 2025-03-07 ENCOUNTER — APPOINTMENT (OUTPATIENT)
Dept: GENERAL RADIOLOGY | Facility: HOSPITAL | Age: 80
End: 2025-03-07
Payer: MEDICARE

## 2025-03-07 ENCOUNTER — APPOINTMENT (OUTPATIENT)
Dept: MRI IMAGING | Facility: HOSPITAL | Age: 80
End: 2025-03-07
Payer: MEDICARE

## 2025-03-07 PROBLEM — M86.071 ACUTE HEMATOGENOUS OSTEOMYELITIS OF RIGHT FOOT: Status: ACTIVE | Noted: 2025-03-07

## 2025-03-07 PROBLEM — L08.9 DIABETIC FOOT INFECTION: Status: ACTIVE | Noted: 2025-03-07

## 2025-03-07 PROBLEM — S80.02XA CONTUSION OF LEFT KNEE: Status: ACTIVE | Noted: 2025-03-07

## 2025-03-07 PROBLEM — E11.628 DIABETIC FOOT INFECTION: Status: ACTIVE | Noted: 2025-03-07

## 2025-03-07 LAB
ANION GAP SERPL CALCULATED.3IONS-SCNC: 13 MMOL/L (ref 5–15)
BUN SERPL-MCNC: 69 MG/DL (ref 8–23)
BUN/CREAT SERPL: 17.4 (ref 7–25)
CALCIUM SPEC-SCNC: 8 MG/DL (ref 8.6–10.5)
CHLORIDE SERPL-SCNC: 102 MMOL/L (ref 98–107)
CK SERPL-CCNC: 108 U/L (ref 20–200)
CO2 SERPL-SCNC: 23 MMOL/L (ref 22–29)
CREAT SERPL-MCNC: 3.97 MG/DL (ref 0.76–1.27)
DEPRECATED RDW RBC AUTO: 48.7 FL (ref 37–54)
EGFRCR SERPLBLD CKD-EPI 2021: 14.5 ML/MIN/1.73
ERYTHROCYTE [DISTWIDTH] IN BLOOD BY AUTOMATED COUNT: 14.7 % (ref 12.3–15.4)
GLUCOSE BLDC GLUCOMTR-MCNC: 163 MG/DL (ref 70–130)
GLUCOSE BLDC GLUCOMTR-MCNC: 164 MG/DL (ref 70–130)
GLUCOSE BLDC GLUCOMTR-MCNC: 197 MG/DL (ref 70–130)
GLUCOSE BLDC GLUCOMTR-MCNC: 268 MG/DL (ref 70–130)
GLUCOSE SERPL-MCNC: 149 MG/DL (ref 65–99)
HCT VFR BLD AUTO: 22.3 % (ref 37.5–51)
HGB BLD-MCNC: 7.3 G/DL (ref 13–17.7)
MCH RBC QN AUTO: 29.6 PG (ref 26.6–33)
MCHC RBC AUTO-ENTMCNC: 32.7 G/DL (ref 31.5–35.7)
MCV RBC AUTO: 90.3 FL (ref 79–97)
PLATELET # BLD AUTO: 215 10*3/MM3 (ref 140–450)
PMV BLD AUTO: 10.1 FL (ref 6–12)
POTASSIUM SERPL-SCNC: 3.9 MMOL/L (ref 3.5–5.2)
RBC # BLD AUTO: 2.47 10*6/MM3 (ref 4.14–5.8)
SODIUM SERPL-SCNC: 138 MMOL/L (ref 136–145)
WBC NRBC COR # BLD AUTO: 13.94 10*3/MM3 (ref 3.4–10.8)

## 2025-03-07 PROCEDURE — 36415 COLL VENOUS BLD VENIPUNCTURE: CPT | Performed by: INTERNAL MEDICINE

## 2025-03-07 PROCEDURE — 82948 REAGENT STRIP/BLOOD GLUCOSE: CPT

## 2025-03-07 PROCEDURE — 63710000001 INSULIN GLARGINE PER 5 UNITS: Performed by: INTERNAL MEDICINE

## 2025-03-07 PROCEDURE — 25010000002 EPOETIN ALFA-EPBX 10000 UNIT/ML SOLUTION: Performed by: STUDENT IN AN ORGANIZED HEALTH CARE EDUCATION/TRAINING PROGRAM

## 2025-03-07 PROCEDURE — 25010000002 PIPERACILLIN SOD-TAZOBACTAM PER 1 G: Performed by: INTERNAL MEDICINE

## 2025-03-07 PROCEDURE — 25810000003 SODIUM CHLORIDE 0.9 % SOLUTION: Performed by: STUDENT IN AN ORGANIZED HEALTH CARE EDUCATION/TRAINING PROGRAM

## 2025-03-07 PROCEDURE — 73718 MRI LOWER EXTREMITY W/O DYE: CPT

## 2025-03-07 PROCEDURE — 85027 COMPLETE CBC AUTOMATED: CPT | Performed by: INTERNAL MEDICINE

## 2025-03-07 PROCEDURE — 80048 BASIC METABOLIC PNL TOTAL CA: CPT | Performed by: INTERNAL MEDICINE

## 2025-03-07 PROCEDURE — 73560 X-RAY EXAM OF KNEE 1 OR 2: CPT

## 2025-03-07 PROCEDURE — 82550 ASSAY OF CK (CPK): CPT | Performed by: STUDENT IN AN ORGANIZED HEALTH CARE EDUCATION/TRAINING PROGRAM

## 2025-03-07 PROCEDURE — 63710000001 INSULIN LISPRO (HUMAN) PER 5 UNITS: Performed by: INTERNAL MEDICINE

## 2025-03-07 PROCEDURE — 25010000002 NA FERRIC GLUC CPLX PER 12.5 MG: Performed by: STUDENT IN AN ORGANIZED HEALTH CARE EDUCATION/TRAINING PROGRAM

## 2025-03-07 RX ORDER — INSULIN LISPRO 100 [IU]/ML
6 INJECTION, SOLUTION INTRAVENOUS; SUBCUTANEOUS
Status: DISCONTINUED | OUTPATIENT
Start: 2025-03-07 | End: 2025-03-19

## 2025-03-07 RX ADMIN — PIPERACILLIN AND TAZOBACTAM 3.38 G: 3; .375 INJECTION, POWDER, FOR SOLUTION INTRAVENOUS at 17:25

## 2025-03-07 RX ADMIN — INSULIN LISPRO 2 UNITS: 100 INJECTION, SOLUTION INTRAVENOUS; SUBCUTANEOUS at 09:49

## 2025-03-07 RX ADMIN — INSULIN LISPRO 6 UNITS: 100 INJECTION, SOLUTION INTRAVENOUS; SUBCUTANEOUS at 17:00

## 2025-03-07 RX ADMIN — PANTOPRAZOLE SODIUM 40 MG: 40 TABLET, DELAYED RELEASE ORAL at 06:33

## 2025-03-07 RX ADMIN — SENNOSIDES AND DOCUSATE SODIUM 2 TABLET: 50; 8.6 TABLET ORAL at 21:28

## 2025-03-07 RX ADMIN — ATORVASTATIN CALCIUM 20 MG: 20 TABLET, FILM COATED ORAL at 21:23

## 2025-03-07 RX ADMIN — SODIUM CHLORIDE 250 MG: 9 INJECTION, SOLUTION INTRAVENOUS at 09:49

## 2025-03-07 RX ADMIN — BUMETANIDE 1 MG: 1 TABLET ORAL at 17:25

## 2025-03-07 RX ADMIN — INSULIN GLARGINE 3 UNITS: 100 INJECTION, SOLUTION SUBCUTANEOUS at 21:22

## 2025-03-07 RX ADMIN — HYDRALAZINE HYDROCHLORIDE 10 MG: 10 TABLET ORAL at 16:16

## 2025-03-07 RX ADMIN — Medication 3 ML: at 10:33

## 2025-03-07 RX ADMIN — Medication 1000 UNITS: at 09:49

## 2025-03-07 RX ADMIN — HYDROCODONE BITARTRATE AND ACETAMINOPHEN 0.5 TABLET: 5; 325 TABLET ORAL at 03:35

## 2025-03-07 RX ADMIN — HYDRALAZINE HYDROCHLORIDE 10 MG: 10 TABLET ORAL at 21:24

## 2025-03-07 RX ADMIN — METOPROLOL SUCCINATE 25 MG: 25 TABLET, EXTENDED RELEASE ORAL at 10:33

## 2025-03-07 RX ADMIN — BUMETANIDE 1 MG: 1 TABLET ORAL at 09:50

## 2025-03-07 RX ADMIN — APIXABAN 2.5 MG: 2.5 TABLET, FILM COATED ORAL at 09:50

## 2025-03-07 RX ADMIN — Medication 3 ML: at 21:23

## 2025-03-07 RX ADMIN — HYDRALAZINE HYDROCHLORIDE 10 MG: 10 TABLET ORAL at 09:50

## 2025-03-07 RX ADMIN — INSULIN LISPRO 6 UNITS: 100 INJECTION, SOLUTION INTRAVENOUS; SUBCUTANEOUS at 11:36

## 2025-03-07 RX ADMIN — LINAGLIPTIN 5 MG: 5 TABLET, FILM COATED ORAL at 09:50

## 2025-03-07 RX ADMIN — INSULIN LISPRO 2 UNITS: 100 INJECTION, SOLUTION INTRAVENOUS; SUBCUTANEOUS at 16:39

## 2025-03-07 RX ADMIN — AMIODARONE HYDROCHLORIDE 200 MG: 200 TABLET ORAL at 09:49

## 2025-03-07 RX ADMIN — INSULIN LISPRO 5 UNITS: 100 INJECTION, SOLUTION INTRAVENOUS; SUBCUTANEOUS at 10:32

## 2025-03-07 RX ADMIN — Medication 2.5 MG: at 21:23

## 2025-03-07 RX ADMIN — EPOETIN ALFA-EPBX 10000 UNITS: 10000 INJECTION, SOLUTION INTRAVENOUS; SUBCUTANEOUS at 13:18

## 2025-03-07 RX ADMIN — INSULIN LISPRO 2 UNITS: 100 INJECTION, SOLUTION INTRAVENOUS; SUBCUTANEOUS at 21:22

## 2025-03-07 RX ADMIN — INSULIN LISPRO 5 UNITS: 100 INJECTION, SOLUTION INTRAVENOUS; SUBCUTANEOUS at 11:36

## 2025-03-07 RX ADMIN — PIPERACILLIN AND TAZOBACTAM 3.38 G: 3; .375 INJECTION, POWDER, FOR SOLUTION INTRAVENOUS at 09:48

## 2025-03-07 RX ADMIN — TAMSULOSIN HYDROCHLORIDE 0.4 MG: 0.4 CAPSULE ORAL at 09:49

## 2025-03-07 RX ADMIN — HYDROCODONE BITARTRATE AND ACETAMINOPHEN 0.5 TABLET: 5; 325 TABLET ORAL at 21:36

## 2025-03-07 RX ADMIN — OXYCODONE HYDROCHLORIDE AND ACETAMINOPHEN 250 MG: 500 TABLET ORAL at 09:50

## 2025-03-07 NOTE — PROGRESS NOTES
Orthopaedic Progress Note     Pain well controlled at left upper extremity. Denied pain at knees during initial evaluation. Now with left knee pain and swelling. History of quad tendon repair but he is unsure when or where this occurred.       CONSTITUTIONAL: No acute distress  LUNGS: Equal chest rise, no shortness of air  CARDIOVASCULAR: palpable peripheral pulses  LYMPH: no lymphadenopathy in the area examined  Musculoskeletal:   LUE  Skin intact with no tenting of the skin   arm resting in a position of comfort  AIN PIN ULNAR NERVE intact   Sensation is intact distally   Foot is wwp   Left knee   Moderate effusion   Midline incision well healed.   Able to contract quadricep  No obvious palpable defect   Difficult to determine continuity of the quad tendon   Too weak to do straight leg raise   Sensation is not intact to light touch distally   Foot with TMA is warm and appears perfused     Cellulitis present at bilateral lower extremities. TMA on the right. Sensation not intact to light touch difficulty with dense palpation. Wound about the third digit on the right foot mild drainage.     79 year old male with left distal clavicle fracture and possible quad tendon tear as his physical exam is difficult secondary to weakness   MRI pending of the left knee.   -NWB LLE until MRI reviewed   -pain control   -ice and elevation   -PT   -dvt ppx  -podiatry managing right toe wound   Regarding left shoulder / distal clavicle fracture   -Avoid forward flexion until further signs of consolidation/healing on radiographs   Sling as tolerated   PT   Ice   Pain control   -please call/chat  with any questions or concerns.    John Mcgee MD   [facility]   [phone] - [place] Office  [phone] - [place] Office

## 2025-03-07 NOTE — NURSING NOTE
Notified by radiologist results of right foot xray. This RN notified Dr. Mchugh regarding results. MD states he will follow up in the morning and consult I.D.

## 2025-03-07 NOTE — DISCHARGE PLACEMENT REQUEST
"Raheem Madyson MENDOZA  (79 y.o. Male)       Date of Birth   1945    Social Security Number       Address   [address] [place] [address]    Home Phone   [phone]    MRN   [id number]       Zoroastrianism   Rastafari    Marital Status                               Admission Date   3/3/25    Admission Type   Emergency    Admitting Provider   Bishop Chakraborty MD    Attending Provider   Bishop Chakraborty MD    Department, Room/Bed   [address], S422/1       Discharge Date       Discharge Disposition       Discharge Destination                                 Attending Provider: Bishop Chakraborty MD    Allergies: Ceclor [Cefaclor]    Isolation: None   Infection: None   Code Status: CPR    Ht: 185.4 cm (73\")   Wt: 127 kg (279 lb 15.8 oz)    Admission Cmt: None   Principal Problem: Traumatic rhabdomyolysis [T79.6XXA]                   Active Insurance as of 3/3/2025       Primary Coverage       Payor Plan Insurance Group Employer/Plan Group    ANTH MEDICARE REPLACEMENT [facility] MEDICARE ADVANTAGE O [id number]       Payor Plan Address Payor Plan Phone Number Payor Plan Fax Number Effective Dates    [address] [phone]  1/1/2025 - None Entered    [facility] [id number]         Subscriber Name Subscriber Birth Date Member ID       MADYSON MACIEL JR. 1945 [id number]                     Emergency Contacts        (Rel.) Home Phone Work Phone [place] Phone    Claudette Maciel (Daughter) [phone] -- [phone]    RAJ MEJIA (Friend) [phone] -- [phone]                "

## 2025-03-07 NOTE — PROGRESS NOTES
[facility] Medicine Services  PROGRESS NOTE    Patient Name: Rock Maciel Jr.  : 1945  MRN: [id number]    Date of Admission: 3/3/2025  Primary Care Physician: Denise Dickson APRN    Subjective   Subjective     CC:  Follow-up for recent fall    Subjective:  Patient continues to feel little bit better every day.  He is little frustrated that his foot has a deep infection.  He plans discussed with podiatry later today with their plan is.  He notes that his left knee contusion continues to get a little larger and he has pain with left knee movement.  The bruising from his recent fall has increased.  He denies any further injuries or trauma to his left knee from his fall before admission.  No other new complaints.    Review of Systems    No current fevers or chills  No current nausea, vomiting, or diarrhea  No current chest pain or palpitations     Objective   Objective     Vital Signs:   Temp:  [98.1 °F (36.7 °C)-99.9 °F (37.7 °C)] 98.1 °F (36.7 °C)  Heart Rate:  [66-73] 66  Resp:  [18] 18  BP: (126-150)/(50-58) 126/50        Physical Exam:    Constitutional: Awake, alert, elderly and chronically ill-appearing  HENT: Moist membranes moist, neck supple  Respiratory: No cough or wheezes, normal respirations, nonlabored breathing   Cardiovascular: Pulse rate is normal, palpable radial pulses  Gastrointestinal:  Soft, nontender, nondistended  Musculoskeletal: Significantly obese BMI is 36, mild lower extremity edema, physically debilitated in appearance, frail  Psychiatric: Appropriate affect, cooperative, conversational  Neurologic: No slurred speech or facial droop, follows commands  Skin: Right foot toe wound is present and unchanged from admission, currently with bandage in place, some bruising noted particularly in the bilateral lower extremity around the knee regions otherwise somewhat pale, no rashes or jaundice, warm          Results Reviewed:  Results from last 7 days   Lab Units 25  9075  03/06/25  0508 03/05/25  0604 03/04/25  0446 03/03/25  1408   WBC 10*3/mm3 13.94* 15.51* 14.98*   < > 24.99*   HEMOGLOBIN g/dL 7.3* 7.0* 6.2*   < > 9.5*   HEMATOCRIT % 22.3* 21.6* 18.9*   < > 29.4*   PLATELETS 10*3/mm3 215 193 182   < > 253   INR   --   --   --   --  2.00*   PROCALCITONIN ng/mL  --   --   --   --  4.80*    < > = values in this interval not displayed.     Results from last 7 days   Lab Units 03/07/25 0449 03/06/25 0508 03/05/25 0604 03/04/25 0446   SODIUM mmol/L 138 135* 138 140   POTASSIUM mmol/L 3.9 4.1 3.9 4.2   CHLORIDE mmol/L 102 101 101 106   CO2 mmol/L 23.0 24.3 24.9 21.0*   BUN mg/dL 69* 67* 71* 69*   CREATININE mg/dL 3.97* 3.85* 3.91* 4.06*   GLUCOSE mg/dL 149* 147* 126* 128*   CALCIUM mg/dL 8.0* 8.0* 7.8* 7.9*   ALK PHOS U/L  --  220* 163* 94   ALT (SGPT) U/L  --  61* 53* 30   AST (SGOT) U/L  --  63* 82* 79*     Estimated Creatinine Clearance: 20.7 mL/min (A) (by C-G formula based on SCr of 3.97 mg/dL (H)).    Microbiology Results Abnormal       None            Imaging Results (Last 24 Hours)       Procedure Component Value Units Date/Time    MRI Foot Right Without Contrast [[id number]] Collected: 03/07/25 0929     Updated: 03/07/25 0929    Narrative:      MRI RIGHT FOREFOOT WITHOUT CONTRAST     HISTORY: Ulcer. Possible abscess.     TECHNIQUE: MRI includes axial T1, STIR as well as coronal T1, T2  fat-sat, and sagittal PD  fat-saturated sequences through the forefoot.     COMPARISON: Foot x-rays 03/06/2025.     FINDINGS: Lateral forefoot soft tissue wound is centered lateral to the  fifth metatarsal and communicates with a sinus tract that contains fluid  signal consistent with abscess formation that surrounds the distal shaft  and head of the fourth metatarsal in patient with previous amputation of  the fourth toe at the fourth MTP joint. Abscess contains several bubbles  of soft tissue air and there is also air within the surrounding soft  tissues consistent with infection. The fluid  signal surrounding the  distal fourth metatarsal shaft measures approximately 3.5 cm in height  by 2.3 cm transverse and extends 3.5 cm in AP dimension. There are  bubbles of air within the head of the fourth metatarsal associated with  areas of cortical bone loss and osteomyelitis of the distal fourth  metatarsal over the distal shaft and head.     There is a chronic thin linear band of calcification extending distal to  the partially amputated fifth metatarsal. Abscess contacts this  calcification which appears to be infected though there is no evidence  for infection of the cancellous bone of the remaining proximal fifth  metatarsal.     No evidence for osteomyelitis of the third through fifth metatarsals or  toes. There is bony fusion at the tarsometatarsal joints, cuneiform  joints. Generalized muscular atrophy with fat replacement. Myositis  surrounding the distal fourth metatarsal. There is generalized soft  tissue swelling with edema and swelling in subcutaneous fat about the  forefoot consistent with cellulitis.       Impression:      1. Soft tissue wound lateral to the fifth metatarsal communicates with  fluid signal/abscess that contains bubbles of air and surrounds the  distal fourth metatarsal where there is osteomyelitis. Fluid signal and  suspected abscess also contacts thin band of dystrophic calcification  extending distal to the amputated segment of the fifth metatarsal  without evidence for osteomyelitis of the cancellous bone of the  remaining proximal fifth metatarsal.  2. Generalized forefoot cellulitis.  3. Chronic muscular atrophy consistent with chronic neuropathy.       XR Foot 3+ View Right [[id number]] Collected: 03/06/25 2055     Updated: 03/06/25 2103    Narrative:      XR FOOT 3+ VW RIGHT-     INDICATIONS: Ulcer, check for osteomyelitis     TECHNIQUE: 4 views of the right foot     COMPARISON: None available     FINDINGS:     The fifth ray is mostly absent, with only residual portion of  the base  of the fifth metatarsal apparent, presumably as a result of prior  partial amputation, but potentially sequela of erosion. Likewise, no  left fourth toe is seen. Soft tissue gas is seen around the head of the  fourth metatarsal, compatible with gas producing infection. Some  portions of the cortex of the head of the fourth metatarsal are  indistinct, suspicious for osteomyelitis, suggest correlation with MRI  or bone scan. No other evidence of erosions. No acute fractures are  noted. Soft tissue swelling especially at the mid to distal foot may  reflect cellulitis. Arterial calcifications are conspicuous. Pes planus  configuration is apparent.       Impression:         Suspicion for osteomyelitis involving the fourth metatarsal head  (consider further evaluation with MRI or bone scan) with surrounding  cellulitis, as well as soft tissue gas suspicious for gas producing  infection.     Discussed by telephone with patient's nurse, Angela, at time of  interpretation, 2058, 3/6/2025.           This report was finalized on 3/6/2025 9:00 PM by Dr. Azam Alaniz M.D on Workstation: [id number]               Results for orders placed during the hospital encounter of 10/03/24    Adult Transthoracic Echo Complete W/ Cont if Necessary Per Protocol    Interpretation Summary    Left ventricular ejection fraction appears to be 61 - 65%.    Left ventricular diastolic function was indeterminate.    The left atrial cavity is moderately dilated.    Left atrial volume is moderately increased.    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    Estimated right ventricular systolic pressure from tricuspid regurgitation is moderately elevated (45-55 mmHg).      I have reviewed the medications:  Scheduled Meds:amiodarone, 200 mg, Oral, Daily  apixaban, 2.5 mg, Oral, BID  vitamin C, 250 mg, Oral, Daily  atorvastatin, 20 mg, Oral, Nightly  bumetanide, 1 mg, Oral, BID Diuretics  cholecalciferol, 1,000 Units, Oral, Daily  [Held  by provider] clopidogrel, 75 mg, Oral, Daily  epoetin vince/vince-epbx, 10,000 Units, Subcutaneous, Weekly  ferric gluconate, 250 mg, Intravenous, Daily  hydrALAZINE, 10 mg, Oral, TID  insulin lispro, 2-9 Units, Subcutaneous, 4x Daily AC & at Bedtime  insulin lispro, 5 Units, Subcutaneous, TID With Meals  linagliptin, 5 mg, Oral, Daily  melatonin, 2.5 mg, Oral, Nightly  metoprolol succinate XL, 25 mg, Oral, Daily  pantoprazole, 40 mg, Oral, Q AM  piperacillin-tazobactam, 3.375 g, Intravenous, Q8H  sodium chloride, 3 mL, Intravenous, Q12H  tamsulosin, 0.4 mg, Oral, Daily      Continuous Infusions:     PRN Meds:.  acetaminophen **OR** acetaminophen **OR** acetaminophen    artificial tears    senna-docusate sodium **AND** polyethylene glycol **AND** bisacodyl **AND** bisacodyl    cadexomer iodine    dextrose    dextrose    famotidine    glucagon (human recombinant)    HYDROcodone-acetaminophen    Morphine **AND** naloxone    nitroglycerin    ondansetron ODT **OR** ondansetron    sodium chloride    sodium chloride    Assessment & Plan   Assessment & Plan     Active Hospital Problems    Diagnosis  POA    **Traumatic rhabdomyolysis [T79.6XXA]  Yes    Contusion of left knee [S80.02XA]  Yes    Acute osteomyelitis of right foot [M86.071]  Yes    Diabetic foot infection [E11.628, L08.9]  Yes    ATN (acute tubular necrosis) [N17.0]  Yes    Closed displaced fracture of left clavicle [S42.002A]  Yes    Pneumonia due to infectious organism [J18.9]  Yes    Persistent atrial fibrillation [I48.19]  Yes    Chronic anticoagulation [Z79.01]  Not Applicable    Stage 3b chronic kidney disease [N18.32]  Yes    Chronic diastolic (congestive) heart failure [I50.32]  Yes    KILLIAN (acute kidney injury) [N17.9]  Yes    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        Brief Hospital Course to date:  Rock Maciel Jr. is a 79 y.o. male   presents the hospital after fall at home while using his stair chair lift with closed head injury and injury to the left shoulder causing left clavicle fracture.  He was found to have rhabdomyolysis and acute renal failure.     Discussion/plan for today:   MRI reviewed and shows abscess and osteomyelitis of the right foot.  Follow-up on podiatry recommendations anticipate he will need surgical intervention.  Hold Eliquis for now.  Cover with Zosyn for now.  I reach out to orthopedic surgery regarding his left knee contusion.  The swelling and bruising seem to increase however he has had substantial IV fluid which may be contributing.  Plan to get x-rays to further evaluate left knee.  Blood pressure is reasonably normotensive.  Continue Johnson catheter and plan eventual voiding trial perhaps the day of discharge.  IV iron initiated for anemia.  No bleeding noted likely some blood loss due to bruising with rhabdomyolysis.  ATN and monitoring renal function.  Leukocytosis remains elevated but stable.  Monitor blood counts for now received transfusion on 3/5 and may need eventual transfusion again.  Outpatient orthopedic follow-up for clavicle fracture.  Continue fall prevention and PT.  Glucose reviewed and insulin adjusted with increasing prandial level.  Treatment plan discussed with patient is in agreement.    Traumatic rhabdomyolysis due to fall and found down on the floor with left knee and shoulder contusion:  Treat with IV fluid.  Nephrology consulted to assist.  Trend CK.  Supportive care and symptom treatment.     Acute renal failure with CKD 3B with mild metabolic acidosis:  Nephrology consulted.  IV fluid.  Clinically appears dry and likely  prerenal, and probable ATN.  Hold nephrotoxins and renally dose medications.     Possible pneumonia:  Significantly elevated leukocytosis, right lower lobe infiltrate, occasional cough.  Suspect possible aspiration pneumonia while patient was down.  Allergy to Ceclor noted.  Patient initiated on vancomycin and Zosyn in emergency room.  Continue Zosyn and check MRSA nasal screen was negative.  No further vancomycin.  Respiratory status has improved    Toe wound:  Noted present on admission.  Consult podiatry to weigh in and assist with treatment recommendations.    Anemia:  Secondary to dilution.  Anemia lab workup.  Blood transfusion 3/5/2025.     Clavicle fracture:  Orthopedic evaluated and will follow-up in clinic.  On x-ray images reviewed and comminuted distal clavicle fracture with displacement of the clavicle shaft     Fall and physical debility and Charcot foot deformity: PT OT evaluation.  Fall prevention.  PT OT recommend SNF     Type 2 diabetes: Monitor blood sugar and adjust insulin as needed.  Tradjenta     Atrial fibrillation: Eliquis anticoagulation.  Continue beta-blocker.  Continue home amiodarone.     Hyperlipidemia: Continue statin.  Stable.     Treatment plan discussed with patient who is in agreement.     DVT prophylaxis: Anticoagulant            Disposition: SNF    CODE STATUS:   Code Status and Medical Interventions: CPR (Attempt to Resuscitate); Full Support   Ordered at: 03/03/25 7113     Level Of Support Discussed With:    Patient     Code Status (Patient has no pulse and is not breathing):    CPR (Attempt to Resuscitate)     Medical Interventions (Patient has pulse or is breathing):    Full Support       Bishop Chakraborty MD  03/07/25

## 2025-03-07 NOTE — PROGRESS NOTES
"      FOLLOW UP NOTE      Name: Rock Maciel Jr. ADMIT: 3/3/2025   : 1945  PCP: Denise Dickson APRN    MRN: [id number] LOS: 4 days   AGE/SEX: 79 y.o. male  ROOM: [facility]     Date of Service: 3/7/2025                           CHIEF COMPLIANTS / REASON FOR FOLLOW UP                Subjective:      Patient resting in bed.  No new complaints.  Some leg swelling.  He is on some O2 support but not overtly dyspneic.  Urine output 1.2 L.     Review of Systems:     Negative except as above       OBJECTIVE                                                                        Exam:  /50 (BP Location: Right arm, Patient Position: Lying)   Pulse 66   Temp 98.1 °F (36.7 °C) (Oral)   Resp 18   Ht 185.4 cm (73\")   Wt 127 kg (279 lb 15.8 oz)   SpO2 92%   BMI 36.94 kg/m²   Intake/Output last 3 shifts:  I/O last 3 completed shifts:  In: -   Out: 2350 [Urine:2350]  Intake/Output this shift:  No intake/output data recorded.    General Appearance: Chronically ill, pale appearing      Lungs:   Lungs largely clear, diminished at lung bases  Heart: RRR  Abdomen:  Soft, nontender  Extremities: Extremities wrapped, trace ankle edema  Neurologic:   Alert and oriented  Johnson with clear urine    Scheduled Meds:amiodarone, 200 mg, Oral, Daily  apixaban, 2.5 mg, Oral, BID  vitamin C, 250 mg, Oral, Daily  atorvastatin, 20 mg, Oral, Nightly  bumetanide, 1 mg, Oral, BID Diuretics  cholecalciferol, 1,000 Units, Oral, Daily  [Held by provider] clopidogrel, 75 mg, Oral, Daily  epoetin vince/vince-epbx, 10,000 Units, Subcutaneous, Weekly  ferric gluconate, 250 mg, Intravenous, Daily  hydrALAZINE, 10 mg, Oral, TID  insulin lispro, 2-9 Units, Subcutaneous, 4x Daily AC & at Bedtime  insulin lispro, 5 Units, Subcutaneous, TID With Meals  linagliptin, 5 mg, Oral, Daily  melatonin, 2.5 mg, Oral, Nightly  metoprolol succinate XL, 25 mg, Oral, Daily  pantoprazole, 40 mg, Oral, Q AM  piperacillin-tazobactam, 3.375 g, Intravenous, " Q8H  sodium chloride, 3 mL, Intravenous, Q12H  tamsulosin, 0.4 mg, Oral, Daily      Continuous Infusions:     PRN Meds:  acetaminophen **OR** acetaminophen **OR** acetaminophen    artificial tears    senna-docusate sodium **AND** polyethylene glycol **AND** bisacodyl **AND** bisacodyl    cadexomer iodine    dextrose    dextrose    famotidine    glucagon (human recombinant)    HYDROcodone-acetaminophen    Morphine **AND** naloxone    nitroglycerin    ondansetron ODT **OR** ondansetron    sodium chloride    sodium chloride         Data Review:                                                                           Labs reviewed        Imaging:                                                                           Radiology reviewed           ASSESSMENT:                                                                                Traumatic rhabdomyolysis    Arteriosclerosis of coronary artery    Essential hypertension    Type 2 diabetes mellitus with circulatory disorder, without long-term current use of insulin    Hyperlipidemia    Charcot-Davida-Tooth disease-like deformity of foot    Fall    S/P CABG x 2    KILLIAN (acute kidney injury)    Chronic diastolic (congestive) heart failure    Stage 3b chronic kidney disease    Chronic anticoagulation    Persistent atrial fibrillation    Closed displaced fracture of left clavicle    Pneumonia due to infectious organism    ATN (acute tubular necrosis)         KILLIAN: likely ATN related to rhabdomyolysis, prerenal insult related to diuretics etc. stabilizing with good urine output.     Rhabdomyolysis related to fall: Resolved.  Lower extremity osteomyelitis/cellulitis  Edema feet  CKD stage III-IV: Baseline creatinine  1.8-2.6 mg/dL with baseline GFR around 25 mL/min  Acute urinary retention requiring Johnson catheter placement.  Clavicle fracture  A-fib with controlled rates.  History of HFpEF with pulmonary hypertension: Slightly volume expanded.  Severe anemia in CKD: TSAT  11%.  Cannot rule out occult GI bleed given anticoagulation.          PLAN:                                                                            Creatinine stable with good urine output.  Continue maintenance diuretic.  No urgent need for RRT..  Will evaluate daily.  Pending MRI foot to evaluate for osteomyelitis.   Antibiotics dosed for  CrCl<15 mL/min.  Johnson management per urology.  Start weekly EPO.     Tiesha Mccrary MD  [facility]  3/7/2025  10:57 EST

## 2025-03-07 NOTE — PLAN OF CARE
Goal Outcome Evaluation:  Plan of Care Reviewed With: patient        Progress: no change  Outcome Evaluation: Pt remians A/O X4, V/S stable, pt currently on 1/2 L nasal canula while awake and uses own CPAP machine while sleeping. Pt wike up with left knee shore and tenderness, PRN Pain medication given. Pt verbalized pain medication is helpful. Pt continue to have the ace bandage wrapped on his both legs as it helps to relieve the pain. Breath sounds are shallow and diminished bilaterally. Pt continue to place on F/C due to urine retention. Pt LBM was 03/05, sennosides-docusate(Pericolace) tab started. Pt on heart healthy diet. 3+ edema noted on left leg and felt hot and tight to touch. 2+ edema noted on right leg and it's warm to touch. Xray results showed possible soft tissue damange producing gas causing infection.Suspected of osteomyelities as well as cellulities. Notified attending  MD about the xray results and MD will f/u pt in the morning. MRI of foot is scheduled for today and consult is completed. Nephro started on Bumex and podiatrist consulted. Pt tolerated the scheduled medications and slept well over night.       Correction: Dr. Mchugh, ordering provider, notified of Xray results.

## 2025-03-07 NOTE — PLAN OF CARE
Goal Outcome Evaluation:  Plan of Care Reviewed With: patient   Pt's vital signs are stable. MRI ordered and completed. X-ray of the knee ordered and completed. Ortho consulted for left knee swelling and pain. Plans for surgery on right foot on Monday 3/10. Care plan ongoing.      Progress: no change

## 2025-03-07 NOTE — CONSULTS
CONSULT NOTE    Infectious Diseases - Nicolasa Flowers MD  [facility]       Patient Identification:  Name: Rock Maciel Jr.  Age: 79 y.o.  Sex: male  :  1945  MRN: [id number]             Date of Consultation: 3/7/2025      Primary Care Physician: Denise Dickson APRN                               Requesting Physician: Dr. Mchugh  Reason for Consultation: Right foot infection with possible gas    History of presenting illness: Patient is a 79-year-old male who presented to the emergency room on 3/3/2025 after incurring a fall while going down the steps with subsequent closed head injury and left shoulder pain.  Patient noted to have left clavicular fracture.  Since his evaluation and admission on 3/3/2025 patient was found to have draining wound on his right foot in the setting of chronic deformity of the right foot.  He has underlying history of diabetes hypertension and sleep apnea and coronary artery disease.  Patient has had prior diabetic foot infections and had multiple surgeries on the foot including amputation of the toes and TMA of the left foot.  He is not sure how long he has been foot wound has been there but it was directed here when he felt drainage from it.  Podiatry service has been consulted, wound cultures have been sent and blood cultures have been drawn which are negative.  His MRSA screen is negative.  Patient has been started on Zosyn.  MRI is ordered and it shows soft tissue wound communicating with suspected tarsal with associated osteomyelitis of the adjacent bone.  Patient is noted to have abscess with air and overall cellulitis of the foot.  Patient is also complaining of right knee pain.  Patient has been afebrile throughout this hospitalization except for low-grade temperature of 99.9 on 3/6/2025.  Patient did have leukocytosis upon admission which is trending down.  Podiatry service has evaluated the patient and surgical intervention is being planned for I&D  and infectious disease service is consulted.    Impression: This presentation in the above context is consistent with:  1-probable systemic illness due to  2-evolving ongoing infection with abscess of the right foot resulting in generalized weakness and  3-subsequent fall and fracture of left clavicle and left knee discomfort  4-diabetes mellitus  5-history of gout  6-sleep apnea  7-obesity  8-hypertension  9-severe anemia  10-acute on chronic renal failure  11-prior multiple diabetic foot infections and surgeries.    Recommendations/Discussions:  At this juncture patient seems to be hemodynamically stable and on appropriate antibiotic therapy based on his presentation MRSA screen and overall clinical course with declining white blood cell count.  Agree with podiatry services plan for I&D and depending upon the definitive nature of surgery whether it could be performed and whether or not there is concern for residual osteomyelitis duration of antibiotic therapy will be decided.  Monitor closely for side effects of antibiotic treatment and supportive care and management of other issues per primary team.  Follow-up on the wound culture and blood culture results and adjust antibiotic therapy accordingly.  Thank you very much for letting me be the part of your patient care please see above impression and recommendations            Past Medical History:  Past Medical History:   Diagnosis Date    Allergic rhinitis     Bronchitis     Coronary artery disease     Diabetes mellitus 2001    DM (diabetes mellitus)     Encounter for annual health examination 05/06/2014    Annual Health Assessment    Gout     Hiatal hernia     History of MRSA infection     RIGHT FOOT 2010    Hyperlipidemia     Hypertension     Murmur     Pneumothorax on right     Sleep apnea     pt wears CPAP at night    Wellness examination 06/24/2015    Annual Wellness Visit     Past Surgical History:  Past Surgical History:   Procedure Laterality Date     ARTERY SURGERY Bilateral     carotid    CARDIAC CATHETERIZATION N/A 7/20/2018    Procedure: Left Heart Cath;  Surgeon: Jo Toney MD;  Location: [facility] CATH INVASIVE LOCATION;  Service: Cardiovascular    CARDIAC CATHETERIZATION N/A 7/20/2018    Procedure: Coronary angiography;  Surgeon: Jo Toney MD;  Location[facility] CATH INVASIVE LOCATION;  Service: Cardiovascular    CAROTID ENDARTERECTOMY Bilateral     COLONOSCOPY  2008    CORONARY ARTERY BYPASS GRAFT WITH AORTIC VALVE REPAIR/REPLACEMENT N/A 7/23/2018    Procedure: INTRAOPERATIVE ALEX, MIDLINE STERNOTOMY, CORONARY ARTERY BYPASS GRAFTING X 3 USING ENDOSCOPICALLY HARVESTED LEFT GREATER SAPHENOUS VEIN,  AORTIC VALVE REPLACEMENT USING 25MM LOPEZ II ULTRA PORCINE VALVE, PRP;  Surgeon: Phong Posey MD;  Location: [facility] OR;  Service: Cardiothoracic    FOOT SURGERY Right 2010    5th digit removal    FOOT SURGERY Left 2011    1 digit removed    INCISION AND DRAINAGE LEG Right 3/28/2020    Procedure: DEBRIDMENT OF RIGHT CALF;  Surgeon: Ross Pineda MD;  Location: [facility] OR;  Service: Vascular;  Laterality: Right;    INGUINAL HERNIA REPAIR Left 11/29/2024    Procedure: INGUINAL HERNIA REPAIR LAPAROSCOPIC WITH DAVINCI ROBOT;  Surgeon: Phong Lazo MD;  Location: [facility] OR;  Service: Robotics - DaVinci;  Laterality: Left;    QUADRICEPS TENDON REPAIR Left 5/14/2019    Procedure: Left QUADRICEPS TENDON REPAIR;  Surgeon: Camilo Hunter MD;  Location: [facility] MAIN OR;  Service: Orthopedics    THORACENTESIS Right 11/21/2016    THORACOSCOPY Right 5/8/2017    Procedure: BRONCHOSCOPY, RIGHT VAT,  TOTAL DECORTICATION RIGHT LUNG, PLEURAL BX, PLACEMENT SUBPLEURAL PAIN CAATHETERS X2;  Surgeon: Donald Orlando III, MD;  Location: [facility] MAIN OR;  Service:     TONSILECTOMY, ADENOIDECTOMY, BILATERAL MYRINGOTOMY AND TUBES        Home Meds:  Medications Prior to Admission   Medication Sig Dispense Refill Last Dose/Taking     acetaminophen (TYLENOL) 325 MG tablet Take 2 tablets by mouth Every 6 (Six) Hours As Needed for Mild Pain.   3/2/2025    amiodarone (PACERONE) 200 MG tablet Take 1 tablet by mouth Daily. 30 tablet 5 3/2/2025    apixaban (ELIQUIS) 2.5 MG tablet tablet Take 1 tablet by mouth 2 (Two) Times a Day. Indications: Atrial Fibrillation 60 tablet 5 3/2/2025    atorvastatin (LIPITOR) 20 MG tablet Take 1 tablet by mouth Every Night. NEED TO SEE PROVIDER FOR FUTURE REFILLS. 30 tablet 0 3/2/2025    bumetanide (BUMEX) 1 MG tablet Take 1 tablet by mouth 2 (Two) Times a Day. 60 tablet 5 3/2/2025    Cholecalciferol 25 MCG (1000 UT) tablet Take 1 tablet by mouth Every 7 (Seven) Days.   3/2/2025    clopidogrel (PLAVIX) 75 MG tablet Take 1 tablet by mouth Daily. 90 tablet 1 3/2/2025    glipizide (GLUCOTROL) 5 MG tablet TAKE 1 TABLET BY MOUTH 2 TIMES A DAY BEFORE MEALS. 180 tablet 1 3/2/2025    glucose blood (Accu-Chek Keshia Plus) test strip USE TO TEST BLOOD SUGAR    TWICE DAILY FOR DIABETES 100 each 8 3/2/2025    hydrALAZINE (APRESOLINE) 25 MG tablet Take 1 tablet by mouth 3 (Three) Times a Day. 90 tablet 5 3/2/2025    linagliptin (Tradjenta) 5 MG tablet tablet Take 1 tablet by mouth Daily. 90 tablet 1 Past Week    metoprolol succinate XL (TOPROL-XL) 25 MG 24 hr tablet Take 1 tablet by mouth Daily. 30 tablet 5 3/2/2025    terazosin (HYTRIN) 2 MG capsule Take 1 capsule by mouth Every Night. 90 capsule 1 Past Week    vitamin C (ASCORBIC ACID) 250 MG tablet Take 1 tablet by mouth Daily.   3/2/2025    Zinc 50 MG tablet    3/2/2025    nitroglycerin (NITROSTAT) 0.4 MG SL tablet Place 1 tablet under the tongue Every 5 (Five) Minutes As Needed for Chest Pain (Only if SBP Greater Than 100). Take no more than 3 doses in 15 minutes. (Patient not taking: Reported on 12/20/2024) 25 tablet 0 Unknown     Current Meds:     Current Facility-Administered Medications:     acetaminophen (TYLENOL) tablet 650 mg, 650 mg, Oral, Q4H PRN, 650 mg at 03/06/25  2347 **OR** acetaminophen (TYLENOL) 160 MG/5ML oral solution 650 mg, 650 mg, Oral, Q4H PRN **OR** acetaminophen (TYLENOL) suppository 650 mg, 650 mg, Rectal, Q4H PRN, Bishop Chakraborty MD    amiodarone (PACERONE) tablet 200 mg, 200 mg, Oral, Daily, Bishop Chakraborty MD, 200 mg at 03/07/25 0949    apixaban (ELIQUIS) tablet 2.5 mg, 2.5 mg, Oral, BID, Bishop Chakraborty MD, 2.5 mg at 03/07/25 0950    artificial tears ophthalmic ointment, , Both Eyes, Q1H PRN, Bishop Chakraborty MD    ascorbic acid (VITAMIN C) tablet 250 mg, 250 mg, Oral, Daily, Bishop Chakraborty MD, 250 mg at 03/07/25 0950    atorvastatin (LIPITOR) tablet 20 mg, 20 mg, Oral, Nightly, Bishop Chakraborty MD, 20 mg at 03/06/25 2038    sennosides-docusate (PERICOLACE) 8.6-50 MG per tablet 2 tablet, 2 tablet, Oral, BID PRN, 2 tablet at 03/06/25 2045 **AND** polyethylene glycol (MIRALAX) packet 17 g, 17 g, Oral, Daily PRN **AND** bisacodyl (DULCOLAX) EC tablet 5 mg, 5 mg, Oral, Daily PRN **AND** bisacodyl (DULCOLAX) suppository 10 mg, 10 mg, Rectal, Daily PRN, Bishop Chakraborty MD    bumetanide (BUMEX) tablet 1 mg, 1 mg, Oral, BID Diuretics, Tiesha Mccrary MD, 1 mg at 03/07/25 0950    cadexomer iodine (IODOSORB) 0.9 % gel 1 Application, 1 Application, Topical, Daily PRN, Bishop Chakraborty MD    cholecalciferol (VITAMIN D3) tablet 1,000 Units, 1,000 Units, Oral, Daily, Bishop Chakraborty MD, 1,000 Units at 03/07/25 0949    [Held by provider] clopidogrel (PLAVIX) tablet 75 mg, 75 mg, Oral, Daily, Bishop Chakraborty MD    dextrose (D50W) (25 g/50 mL) IV injection 25 g, 25 g, Intravenous, Q15 Min PRN, Bishop Chakraborty MD    dextrose (GLUTOSE) oral gel 15 g, 15 g, Oral, Q15 Min PRN, Bishop Chakraborty MD    famotidine (PEPCID) tablet 10 mg, 10 mg, Oral, BID PRN, Bishop Chakraborty MD    ferric gluconate (FERRLECIT) 250 MG in sodium chloride 0.9% 250 mL IVPB, 250 mg, Intravenous,  Daily, Tiesha Mccrary MD, Last Rate: 125 mL/hr at 03/07/25 0949, 250 mg at 03/07/25 0949    glucagon (GLUCAGEN) injection 1 mg, 1 mg, Intramuscular, Q15 Min PRN, Bishop Chakraborty MD    hydrALAZINE (APRESOLINE) tablet 10 mg, 10 mg, Oral, TID, Bishop Chakraborty MD, 10 mg at 03/07/25 0950    HYDROcodone-acetaminophen (NORCO) 5-325 MG per tablet 0.5 tablet, 0.5 tablet, Oral, Q6H PRN, Bishop Chakraborty MD, 0.5 tablet at 03/07/25 0335    insulin lispro (HUMALOG/ADMELOG) injection 2-9 Units, 2-9 Units, Subcutaneous, 4x Daily AC & at Bedtime, Bishop Chakraborty MD, 2 Units at 03/07/25 0949    insulin lispro (HUMALOG/ADMELOG) injection 5 Units, 5 Units, Subcutaneous, TID With Meals, Bishop Chakraborty MD, 5 Units at 03/06/25 1709    linagliptin (TRADJENTA) tablet 5 mg, 5 mg, Oral, Daily, Bishop Chakraborty MD, 5 mg at 03/07/25 0950    melatonin tablet 2.5 mg, 2.5 mg, Oral, Nightly, Bishop Chakraborty MD, 2.5 mg at 03/06/25 2036    metoprolol succinate XL (TOPROL-XL) 24 hr tablet 25 mg, 25 mg, Oral, Daily, Bishop Chakraborty MD, 25 mg at 03/06/25 0830    morphine injection 1 mg, 1 mg, Intravenous, Q4H PRN **AND** naloxone (NARCAN) injection 0.4 mg, 0.4 mg, Intravenous, Q5 Min PRN, Bishop Chakraborty MD    nitroglycerin (NITROSTAT) SL tablet 0.4 mg, 0.4 mg, Sublingual, Q5 Min PRN, Bishop Chakraborty MD    ondansetron ODT (ZOFRAN-ODT) disintegrating tablet 4 mg, 4 mg, Oral, Q6H PRN **OR** ondansetron (ZOFRAN) injection 4 mg, 4 mg, Intravenous, Q6H PRN, Bishop Chakraborty MD    pantoprazole (PROTONIX) EC tablet 40 mg, 40 mg, Oral, Q AM, Bishop Chakraborty MD, 40 mg at 03/07/25 0633    piperacillin-tazobactam (ZOSYN) 3.375 g IVPB in 100 mL NS MBP (CD), 3.375 g, Intravenous, Q8H, Bishop Chakraborty MD, 3.375 g at 03/07/25 0948    sodium chloride 0.9 % flush 3 mL, 3 mL, Intravenous, Q12H, Bishop Chakraborty MD, 3 mL at 03/06/25 1028    sodium  "chloride 0.9 % flush 3-10 mL, 3-10 mL, Intravenous, PRN, Bishop Chakraborty MD    sodium chloride 0.9 % infusion 40 mL, 40 mL, Intravenous, PRN, Bishop Chakraborty MD    tamsulosin (FLOMAX) 24 hr capsule 0.4 mg, 0.4 mg, Oral, Daily, Bishop Chakraborty MD, 0.4 mg at 03/07/25 0954  Allergies:  Allergies   Allergen Reactions    Ceclor [Cefaclor] Rash     Rash in his 50s; he tolerated piperacillin-tazobactam in March 2020     Social History:   Social History     Tobacco Use    Smoking status: Never     Passive exposure: Never    Smokeless tobacco: Never   Substance Use Topics    Alcohol use: Yes     Comment: either one glass wine or one glass liquor per  day      Family History:  Family History   Problem Relation Age of Onset    Hypertension Father     Malig Hyperthermia Neg Hx           Review of Systems  See history of present illness and past medical history.  Patient denies headache, dizziness, syncope, falls, trauma, change in vision, change in hearing, change in taste, changes in weight, changes in appetite, focal weakness, numbness, or paresthesia.  Patient denies chest pain, palpitations, dyspnea, orthopnea, PND, cough, sinus pressure, rhinorrhea, epistaxis, hemoptysis, nausea, vomiting,hematemesis, diarrhea, constipation or hematchezia.  Denies cold or heat intolerance, polydipsia, polyuria, polyphagia. Denies hematuria, pyuria, dysuria, hesitancy, frequency or urgency. Denies consumption of raw and under cooked meats foods or change in water source.  Denies fever, chills, sweats, night sweats.  Denies missing any routine medications. Remainder of ROS is negative.      Vitals:   /50 (BP Location: Right arm, Patient Position: Lying)   Pulse 66   Temp 98.1 °F (36.7 °C) (Oral)   Resp 18   Ht 185.4 cm (73\")   Wt 127 kg (279 lb 15.8 oz)   SpO2 92%   BMI 36.94 kg/m²   I/O:   Intake/Output Summary (Last 24 hours) at 3/7/2025 1010  Last data filed at 3/7/2025 0600  Gross per 24 hour "   Intake --   Output 1250 ml   Net -1250 ml     Exam:  Patient is examined using the personal protective equipment as per guidelines from infection control for this particular patient as enacted.  Hand washing was performed before and after patient interaction.  General Appearance:    Alert, cooperative, no distress, appears stated age   Head:    Normocephalic, without obvious abnormality, atraumatic   Eyes:    PERRL, conjunctivae/corneas clear, EOM's intact, both eyes   Ears:    Normal external ear canals, both ears   Nose:   Nares normal, septum midline, mucosa normal, no drainage    or sinus tenderness   Throat:   Lips, tongue, gums normal; oral mucosa pink and moist   Neck:   Supple   Back:     Symmetric, no curvature, ROM normal, no CVA tenderness   Lungs:     Clear to auscultation bilaterally, respirations unlabored   Chest Wall:    No tenderness or deformity, left clavicle tenderness noted    Heart:    Regular rate and rhythm, S1 and S2 normal, no murmur, rub   or gallop   Abdomen:   Obese soft nontender   Extremities:         Currently his right foot is dressed.       Skin: Erythematous changes and also noted   Neurologic: Alert and oriented x 3 gait not tested       Data Review:    I reviewed the patient's new clinical results.  Results from last 7 days   Lab Units 03/07/25 0449 03/06/25  0508 03/05/25  0604 03/04/25  0446 03/03/25  1408   WBC 10*3/mm3 13.94* 15.51* 14.98* 14.09* 24.99*   HEMOGLOBIN g/dL 7.3* 7.0* 6.2* 7.0* 9.5*   PLATELETS 10*3/mm3 215 193 182 185 253     Results from last 7 days   Lab Units 03/07/25 0449 03/06/25  0508 03/05/25  0604 03/04/25  0446 03/03/25  1408   SODIUM mmol/L 138 135* 138 140 136   POTASSIUM mmol/L 3.9 4.1 3.9 4.2 4.9   CHLORIDE mmol/L 102 101 101 106 100   CO2 mmol/L 23.0 24.3 24.9 21.0* 16.0*   BUN mg/dL 69* 67* 71* 69* 64*   CREATININE mg/dL 3.97* 3.85* 3.91* 4.06* 4.22*   CALCIUM mg/dL 8.0* 8.0* 7.8* 7.9* 8.8   GLUCOSE mg/dL 149* 147* 126* 128* 197*     MRI Foot  Right Without Contrast    Result Date: 3/7/2025  1. Soft tissue wound lateral to the fifth metatarsal communicates with fluid signal/abscess that contains bubbles of air and surrounds the distal fourth metatarsal where there is osteomyelitis. Fluid signal and suspected abscess also contacts thin band of dystrophic calcification extending distal to the amputated segment of the fifth metatarsal without evidence for osteomyelitis of the cancellous bone of the remaining proximal fifth metatarsal. 2. Generalized forefoot cellulitis. 3. Chronic muscular atrophy consistent with chronic neuropathy.      XR Foot 3+ View Right    Result Date: 3/6/2025   Suspicion for osteomyelitis involving the fourth metatarsal head (consider further evaluation with MRI or bone scan) with surrounding cellulitis, as well as soft tissue gas suspicious for gas producing infection.  Discussed by telephone with patient's nurse, Angela, at time of interpretation, 2058, 3/6/2025.    This report was finalized on 3/6/2025 9:00 PM by Dr. Azam Alaniz M.D on Workstation: [id number]      US Renal Bilateral    Result Date: 3/3/2025  1. Unremarkable bilateral renal ultrasound. 2. Urinary bladder diverticulum.   This report was finalized on 3/3/2025 5:05 PM by Dr. Marquez Olmstead M.D on Workstation: [id number]      XR Chest 1 View    Result Date: 3/3/2025  1. Mild cardiomegaly. 2. Mild patchy atelectasis or pneumonia in the right lung base.   This report was finalized on 3/3/2025 4:55 PM by Dr. Marquez Olmstead M.D on Workstation: [id number]      CT Head Without Contrast    Result Date: 3/3/2025   No acute intracranial hemorrhage or hydrocephalus. If there is further clinical concern, MRI could be considered for further evaluation.  This report was finalized on 3/3/2025 4:00 PM by Dr. Azam Alaniz M.D on Workstation: [id number]     Microbiology Results (last 10 days)       Procedure Component Value - Date/Time    Legionella Antigen, Urine -  Urine, Urine, Clean Catch [[id number]]  (Normal) Collected: 03/06/25 1143    Lab Status: Final result Specimen: Urine, Clean Catch Updated: 03/06/25 1252     LEGIONELLA ANTIGEN, URINE Negative    S. Pneumo Ag Urine or CSF - Urine, Urine, Clean Catch [[id number]]  (Normal) Collected: 03/06/25 1143    Lab Status: Final result Specimen: Urine, Clean Catch Updated: 03/06/25 1252     Strep Pneumo Ag Negative    MRSA Screen, PCR (Inpatient) - Swab, Nares [[id number]]  (Normal) Collected: 03/03/25 2220    Lab Status: Final result Specimen: Swab from Nares Updated: 03/04/25 0013     MRSA PCR No MRSA Detected    Narrative:      The negative predictive value of this diagnostic test is high and should only be used to consider de-escalating anti-MRSA therapy. A positive result may indicate colonization with MRSA and must be correlated clinically.    Blood Culture - Blood, Arm, Left [[id number]]  (Normal) Collected: 03/03/25 1747    Lab Status: Preliminary result Specimen: Blood from Arm, Left Updated: 03/06/25 1815     Blood Culture No growth at 3 days    Blood Culture - Blood, Hand, Right [[id number]]  (Normal) Collected: 03/03/25 1745    Lab Status: Preliminary result Specimen: Blood from Hand, Right Updated: 03/06/25 1815     Blood Culture No growth at 3 days              Assessment:  Active Hospital Problems    Diagnosis  POA    **Traumatic rhabdomyolysis [T79.6XXA]  Yes    ATN (acute tubular necrosis) [N17.0]  Yes    Closed displaced fracture of left clavicle [S42.002A]  Yes    Pneumonia due to infectious organism [J18.9]  Yes    Persistent atrial fibrillation [I48.19]  Yes    Chronic anticoagulation [Z79.01]  Not Applicable    Stage 3b chronic kidney disease [N18.32]  Yes    Chronic diastolic (congestive) heart failure [I50.32]  Yes    KILLIAN (acute kidney injury) [N17.9]  Yes    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         Plan:  See above  Nicolasa Denny MD   3/7/2025  10:10 EST    Parts of this note may be an electronic transcription/translation of spoken language to printed text using the Dragon dictation system.

## 2025-03-07 NOTE — CASE MANAGEMENT/SOCIAL WORK
Continued Stay Note  [facility]     Patient Name: Rock Maciel Jr.  MRN: [id number]  Today's Date: 3/7/2025    Admit Date: 3/3/2025    Plan: Pending SNF referrals; will need pre-cert   Discharge Plan       Row Name 03/07/25 1407       Plan    Plan Pending SNF referrals; will need pre-cert    Plan Comments CCP met with patient at bedside. Discussed SNF. Patient requested referral to [facility]. CCP placed referrals in [facility]. will need pre-cert. Andra TALLEY                   Discharge Codes    No documentation.                 Expected Discharge Date and Time       Expected Discharge Date Expected Discharge Time    Mar 11, 2025               MAYANK [email or web address]

## 2025-03-07 NOTE — PROGRESS NOTES
Podiatry Progress Note      Patient: Rock Maciel Jr. Admit Date: 03/03/2025    Age: 79 y.o.   PCP: Denise Dickson APRN    MRN: [id number]  Room: [facility]        Subjective     Chief Complaint     Chief Complaint   Patient presents with    Fall        HPI     Patient resting in bed, comfortable.  No complaints this lower extremity pain.  X-rays and MRIs performed which are showing osteomyelitis and gas producing infection.    Past Medical History     Past Medical History:   Diagnosis Date    Allergic rhinitis     Bronchitis     Coronary artery disease     Diabetes mellitus 2001    DM (diabetes mellitus)     Encounter for annual health examination 05/06/2014    Annual Health Assessment    Gout     Hiatal hernia     History of MRSA infection     RIGHT FOOT 2010    Hyperlipidemia     Hypertension     Murmur     Pneumothorax on right     Sleep apnea     pt wears CPAP at night    Wellness examination 06/24/2015    Annual Wellness Visit        Past Surgical History:   Procedure Laterality Date    ARTERY SURGERY Bilateral     carotid    CARDIAC CATHETERIZATION N/A 7/20/2018    Procedure: Left Heart Cath;  Surgeon: Jo Toney MD;  Location: [facility] INVASIVE LOCATION;  Service: Cardiovascular    CARDIAC CATHETERIZATION N/A 7/20/2018    Procedure: Coronary angiography;  Surgeon: Jo Toney MD;  Location: [facility] INVASIVE LOCATION;  Service: Cardiovascular    CAROTID ENDARTERECTOMY Bilateral     COLONOSCOPY  2008    CORONARY ARTERY BYPASS GRAFT WITH AORTIC VALVE REPAIR/REPLACEMENT N/A 7/23/2018    Procedure: INTRAOPERATIVE ALEX, MIDLINE STERNOTOMY, CORONARY ARTERY BYPASS GRAFTING X 3 USING ENDOSCOPICALLY HARVESTED LEFT GREATER SAPHENOUS VEIN,  AORTIC VALVE REPLACEMENT USING 25MM LOPEZ II ULTRA PORCINE VALVE, PRP;  Surgeon: Phong Posey MD;  Location: [facility] OR;  Service: Cardiothoracic    FOOT SURGERY Right 2010    5th digit removal    FOOT SURGERY Left 2011    1 digit removed     INCISION AND DRAINAGE LEG Right 3/28/2020    Procedure: DEBRIDMENT OF RIGHT CALF;  Surgeon: Ross Pineda MD;  Location[facility];  Service: Vascular;  Laterality: Right;    INGUINAL HERNIA REPAIR Left 11/29/2024    Procedure: INGUINAL HERNIA REPAIR LAPAROSCOPIC WITH DAVINCI ROBOT;  Surgeon: Phong Lazo MD;  Location: [facility] MAIN OR;  Service: Robotics - DaVinci;  Laterality: Left;    QUADRICEPS TENDON REPAIR Left 5/14/2019    Procedure: Left QUADRICEPS TENDON REPAIR;  Surgeon: Camilo Hunter MD;  Location: [facility] MA[facility];  Service: Orthopedics    THORACENTESIS Right 11/21/2016    THORACOSCOPY Right 5/8/2017    Procedure: BRONCHOSCOPY, RIGHT VAT,  TOTAL DECORTICATION RIGHT LUNG, PLEURAL BX, PLACEMENT SUBPLEURAL PAIN CAATHETERS X2;  Surgeon: Donald Orlando III, MD;  Location: [facility] MAIN OR;  Service:     TONSILECTOMY, ADENOIDECTOMY, BILATERAL MYRINGOTOMY AND TUBES          Allergies   Allergen Reactions    Ceclor [Cefaclor] Rash     Rash in his 50s; he tolerated piperacillin-tazobactam in March 2020        Social History     Tobacco Use   Smoking Status Never    Passive exposure: Never   Smokeless Tobacco Never        Objective   Physical Exam    Vitals:    03/07/25 1320   BP: 146/58   Pulse: 68   Resp: 18   Temp: 98.2 °F (36.8 °C)   SpO2: 100%        Dressing clean dry and intact, small amount of purulence expressed.  Mild improvement in the erythema and edema.    Labs     Lab Results   Component Value Date    HGBA1C 6.00 (H) 03/03/2025    POCGLU 268 (H) 03/07/2025    SEDRATE 95 (H) 03/26/2020        CBC:      Lab 03/07/25  0449 03/06/25  0508 03/05/25  0604 03/04/25  0446 03/03/25  1408   WBC 13.94* 15.51* 14.98* 14.09* 24.99*   HEMOGLOBIN 7.3* 7.0* 6.2* 7.0* 9.5*   HEMATOCRIT 22.3* 21.6* 18.9* 21.0* 29.4*   PLATELETS 215 193 182 185 253   NEUTROS ABS  --   --   --   --  22.44*   MCV 90.3 91.1 88.3 87.9 91.6          Results for orders placed or performed during the hospital  encounter of 03/03/25   Blood Culture - Blood, Arm, Left    Specimen: Arm, Left; Blood   Result Value Ref Range    Blood Culture No growth at 3 days         XR Knee 1 or 2 View Left  Narrative: XR KNEE 1 OR 2 VW LEFT-     DATE OF EXAM: 3/7/2025 1:51 PM     INDICATION: Knee pain and swelling status post fall 5 days ago.     COMPARISON: Left femur radiographs 5/6/2019.     TECHNIQUE: AP and lateral views of the knee were obtained.     FINDINGS:  The lateral views are limited by oblique patient positioning. Diffuse  osteopenia. Ossifications superior to the patella could represent  osteochondral bodies in the suprapatellar joint space, partial  ossification of the quadriceps tendon, and/or avulsion fragments from  the superior patella. Small inferior patellar enthesophyte. Severe DJD  in the medial compartment with associated mild varus angulation. Lateral  and patellofemoral joint spaces are fairly well-maintained. Moderate to  large knee joint effusion. Severe vascular calcifications.     Impression:    1. Diffuse osteopenia and moderate to large knee joint effusion with  ossification superior to the patella that could represent osteochondral  bodies suprapatellar joint space, partial ossification of the quadriceps  tendon, and/or avulsion fracture fragments from the superior patella.  Recommend correlating for quadriceps tendon integrity.  3. Severe DJD in the medial compartment.     This report was finalized on 3/7/2025 2:38 PM by Alverto Jones MD on  Workstation: [id number]     MRI Foot Right Without Contrast  Narrative: MRI RIGHT FOREFOOT WITHOUT CONTRAST     HISTORY: Ulcer. Possible abscess.     TECHNIQUE: MRI includes axial T1, STIR as well as coronal T1, T2  fat-sat, and sagittal PD  fat-saturated sequences through the forefoot.     COMPARISON: Foot x-rays 03/06/2025.     FINDINGS: Lateral forefoot soft tissue wound is centered lateral to the  fifth metatarsal and communicates with a sinus tract that  contains fluid  signal consistent with abscess formation that surrounds the distal shaft  and head of the fourth metatarsal in patient with previous amputation of  the fourth toe at the fourth MTP joint. Abscess contains several bubbles  of soft tissue air and there is also air within the surrounding soft  tissues consistent with infection. The fluid signal surrounding the  distal fourth metatarsal shaft measures approximately 3.5 cm in height  by 2.3 cm transverse and extends 3.5 cm in AP dimension. There are  bubbles of air within the head of the fourth metatarsal associated with  areas of cortical bone loss and osteomyelitis of the distal fourth  metatarsal over the distal shaft and head.     There is a chronic thin linear band of calcification extending distal to  the partially amputated fifth metatarsal. Abscess contacts this  calcification which appears to be infected though there is no evidence  for infection of the cancellous bone of the remaining proximal fifth  metatarsal.     No evidence for osteomyelitis of the third through fifth metatarsals or  toes. There is bony fusion at the tarsometatarsal joints, cuneiform  joints. Generalized muscular atrophy with fat replacement. Myositis  surrounding the distal fourth metatarsal. There is generalized soft  tissue swelling with edema and swelling in subcutaneous fat about the  forefoot consistent with cellulitis.     Impression: 1. Soft tissue wound lateral to the fifth metatarsal communicates with  fluid signal/abscess that contains bubbles of air and surrounds the  distal fourth metatarsal where there is osteomyelitis. Fluid signal and  suspected abscess also contacts thin band of dystrophic calcification  extending distal to the amputated segment of the fifth metatarsal  without evidence for osteomyelitis of the cancellous bone of the  remaining proximal fifth metatarsal.  2. Generalized forefoot cellulitis.  3. Chronic muscular atrophy consistent with  chronic neuropathy.     This report was finalized on 3/7/2025 1:02 PM by Freddy Guzman M.D on  Workstation: [id number]          Assessment/Plan     79-year-old male with right foot osteomyelitis and possible abscess    -Patient examined evaluated by myself  - X-rays and MRIs reviewed.  Agree with the findings.  I was able to get a blunt probe to the ulceration which was an open area which is likely the gas seen on x-ray.  -Continue daily dressing changes  - I placed the patient in the operating room schedule for Monday morning for partial bone excision, incision and drainage of the right foot  - I did describe with the patient that he is still at risk for major amputation of his right lower extremity if the infection worsens.  He understands      Jimenez Mchugh DPM  Office: [phone]

## 2025-03-08 ENCOUNTER — APPOINTMENT (OUTPATIENT)
Dept: MRI IMAGING | Facility: HOSPITAL | Age: 80
End: 2025-03-08
Payer: MEDICARE

## 2025-03-08 PROBLEM — A49.02 MRSA (METHICILLIN RESISTANT STAPHYLOCOCCUS AUREUS) INFECTION: Status: ACTIVE | Noted: 2025-03-08

## 2025-03-08 LAB
ANION GAP SERPL CALCULATED.3IONS-SCNC: 12.5 MMOL/L (ref 5–15)
BACTERIA SPEC AEROBE CULT: NORMAL
BACTERIA SPEC AEROBE CULT: NORMAL
BUN SERPL-MCNC: 76 MG/DL (ref 8–23)
BUN/CREAT SERPL: 18 (ref 7–25)
CALCIUM SPEC-SCNC: 8.1 MG/DL (ref 8.6–10.5)
CHLORIDE SERPL-SCNC: 103 MMOL/L (ref 98–107)
CK SERPL-CCNC: 52 U/L (ref 20–200)
CO2 SERPL-SCNC: 22.5 MMOL/L (ref 22–29)
CREAT SERPL-MCNC: 4.22 MG/DL (ref 0.76–1.27)
DEPRECATED RDW RBC AUTO: 48.7 FL (ref 37–54)
EGFRCR SERPLBLD CKD-EPI 2021: 13.5 ML/MIN/1.73
ERYTHROCYTE [DISTWIDTH] IN BLOOD BY AUTOMATED COUNT: 14.7 % (ref 12.3–15.4)
GLUCOSE BLDC GLUCOMTR-MCNC: 153 MG/DL (ref 70–130)
GLUCOSE BLDC GLUCOMTR-MCNC: 184 MG/DL (ref 70–130)
GLUCOSE BLDC GLUCOMTR-MCNC: 200 MG/DL (ref 70–130)
GLUCOSE BLDC GLUCOMTR-MCNC: 275 MG/DL (ref 70–130)
GLUCOSE SERPL-MCNC: 119 MG/DL (ref 65–99)
HCT VFR BLD AUTO: 22.9 % (ref 37.5–51)
HGB BLD-MCNC: 7.3 G/DL (ref 13–17.7)
MCH RBC QN AUTO: 28.9 PG (ref 26.6–33)
MCHC RBC AUTO-ENTMCNC: 31.9 G/DL (ref 31.5–35.7)
MCV RBC AUTO: 90.5 FL (ref 79–97)
PLATELET # BLD AUTO: 238 10*3/MM3 (ref 140–450)
PMV BLD AUTO: 10.1 FL (ref 6–12)
POTASSIUM SERPL-SCNC: 4.1 MMOL/L (ref 3.5–5.2)
RBC # BLD AUTO: 2.53 10*6/MM3 (ref 4.14–5.8)
SODIUM SERPL-SCNC: 138 MMOL/L (ref 136–145)
WBC NRBC COR # BLD AUTO: 11.08 10*3/MM3 (ref 3.4–10.8)

## 2025-03-08 PROCEDURE — 25010000002 DAPTOMYCIN PER 1 MG: Performed by: INTERNAL MEDICINE

## 2025-03-08 PROCEDURE — 73721 MRI JNT OF LWR EXTRE W/O DYE: CPT

## 2025-03-08 PROCEDURE — 82948 REAGENT STRIP/BLOOD GLUCOSE: CPT

## 2025-03-08 PROCEDURE — 85027 COMPLETE CBC AUTOMATED: CPT | Performed by: INTERNAL MEDICINE

## 2025-03-08 PROCEDURE — 63710000001 INSULIN GLARGINE PER 5 UNITS: Performed by: INTERNAL MEDICINE

## 2025-03-08 PROCEDURE — 82550 ASSAY OF CK (CPK): CPT | Performed by: STUDENT IN AN ORGANIZED HEALTH CARE EDUCATION/TRAINING PROGRAM

## 2025-03-08 PROCEDURE — 63710000001 INSULIN LISPRO (HUMAN) PER 5 UNITS: Performed by: INTERNAL MEDICINE

## 2025-03-08 PROCEDURE — 80048 BASIC METABOLIC PNL TOTAL CA: CPT | Performed by: INTERNAL MEDICINE

## 2025-03-08 PROCEDURE — 25010000002 PIPERACILLIN SOD-TAZOBACTAM PER 1 G: Performed by: INTERNAL MEDICINE

## 2025-03-08 RX ADMIN — INSULIN LISPRO 2 UNITS: 100 INJECTION, SOLUTION INTRAVENOUS; SUBCUTANEOUS at 21:50

## 2025-03-08 RX ADMIN — METOPROLOL SUCCINATE 25 MG: 25 TABLET, EXTENDED RELEASE ORAL at 08:52

## 2025-03-08 RX ADMIN — TAMSULOSIN HYDROCHLORIDE 0.4 MG: 0.4 CAPSULE ORAL at 08:52

## 2025-03-08 RX ADMIN — HYDRALAZINE HYDROCHLORIDE 10 MG: 10 TABLET ORAL at 08:51

## 2025-03-08 RX ADMIN — HYDRALAZINE HYDROCHLORIDE 10 MG: 10 TABLET ORAL at 17:18

## 2025-03-08 RX ADMIN — INSULIN LISPRO 6 UNITS: 100 INJECTION, SOLUTION INTRAVENOUS; SUBCUTANEOUS at 12:02

## 2025-03-08 RX ADMIN — Medication 2.5 MG: at 21:01

## 2025-03-08 RX ADMIN — POLYETHYLENE GLYCOL 3350 17 G: 17 POWDER, FOR SOLUTION ORAL at 21:21

## 2025-03-08 RX ADMIN — INSULIN LISPRO 6 UNITS: 100 INJECTION, SOLUTION INTRAVENOUS; SUBCUTANEOUS at 08:53

## 2025-03-08 RX ADMIN — OXYCODONE HYDROCHLORIDE AND ACETAMINOPHEN 250 MG: 500 TABLET ORAL at 08:51

## 2025-03-08 RX ADMIN — LINAGLIPTIN 5 MG: 5 TABLET, FILM COATED ORAL at 08:52

## 2025-03-08 RX ADMIN — PIPERACILLIN AND TAZOBACTAM 3.38 G: 3; .375 INJECTION, POWDER, FOR SOLUTION INTRAVENOUS at 00:30

## 2025-03-08 RX ADMIN — INSULIN GLARGINE 4 UNITS: 100 INJECTION, SOLUTION SUBCUTANEOUS at 21:50

## 2025-03-08 RX ADMIN — HYDROCODONE BITARTRATE AND ACETAMINOPHEN 0.5 TABLET: 5; 325 TABLET ORAL at 05:50

## 2025-03-08 RX ADMIN — DAPTOMYCIN 800 MG: 500 INJECTION, POWDER, LYOPHILIZED, FOR SOLUTION INTRAVENOUS at 17:18

## 2025-03-08 RX ADMIN — HYDROCODONE BITARTRATE AND ACETAMINOPHEN 0.5 TABLET: 5; 325 TABLET ORAL at 23:31

## 2025-03-08 RX ADMIN — BUMETANIDE 1 MG: 1 TABLET ORAL at 17:18

## 2025-03-08 RX ADMIN — HYDRALAZINE HYDROCHLORIDE 10 MG: 10 TABLET ORAL at 21:01

## 2025-03-08 RX ADMIN — INSULIN LISPRO 2 UNITS: 100 INJECTION, SOLUTION INTRAVENOUS; SUBCUTANEOUS at 08:52

## 2025-03-08 RX ADMIN — Medication 3 ML: at 21:01

## 2025-03-08 RX ADMIN — ATORVASTATIN CALCIUM 20 MG: 20 TABLET, FILM COATED ORAL at 21:01

## 2025-03-08 RX ADMIN — Medication 3 ML: at 08:53

## 2025-03-08 RX ADMIN — PIPERACILLIN AND TAZOBACTAM 3.38 G: 3; .375 INJECTION, POWDER, FOR SOLUTION INTRAVENOUS at 18:41

## 2025-03-08 RX ADMIN — BUMETANIDE 1 MG: 1 TABLET ORAL at 08:52

## 2025-03-08 RX ADMIN — AMIODARONE HYDROCHLORIDE 200 MG: 200 TABLET ORAL at 08:52

## 2025-03-08 RX ADMIN — BISACODYL 5 MG: 5 TABLET, COATED ORAL at 18:45

## 2025-03-08 RX ADMIN — INSULIN LISPRO 6 UNITS: 100 INJECTION, SOLUTION INTRAVENOUS; SUBCUTANEOUS at 17:18

## 2025-03-08 RX ADMIN — PIPERACILLIN AND TAZOBACTAM 3.38 G: 3; .375 INJECTION, POWDER, FOR SOLUTION INTRAVENOUS at 08:51

## 2025-03-08 RX ADMIN — PANTOPRAZOLE SODIUM 40 MG: 40 TABLET, DELAYED RELEASE ORAL at 05:50

## 2025-03-08 RX ADMIN — INSULIN LISPRO 6 UNITS: 100 INJECTION, SOLUTION INTRAVENOUS; SUBCUTANEOUS at 17:17

## 2025-03-08 RX ADMIN — Medication 1000 UNITS: at 08:51

## 2025-03-08 RX ADMIN — PIPERACILLIN AND TAZOBACTAM 3.38 G: 3; .375 INJECTION, POWDER, FOR SOLUTION INTRAVENOUS at 23:30

## 2025-03-08 NOTE — PROGRESS NOTES
"  Infectious Diseases Progress Note    Nicolasa Denny MD     [facility]  Los: 5 days  Patient Identification:  Name: Rock Maciel Jr.  Age: 79 y.o.  Sex: male  :  1945  MRN: [id number]         Primary Care Physician: Denise Dickson, ZEINA        Subjective: No new complaints.  Denies any fever and chills.  Still has left-sided clavicle and shoulder pain.  Interval History: See consultation note.    Objective:    Scheduled Meds:amiodarone, 200 mg, Oral, Daily  [Held by provider] apixaban, 2.5 mg, Oral, BID  vitamin C, 250 mg, Oral, Daily  atorvastatin, 20 mg, Oral, Nightly  bumetanide, 1 mg, Oral, BID Diuretics  cholecalciferol, 1,000 Units, Oral, Daily  [Held by provider] clopidogrel, 75 mg, Oral, Daily  DAPTOmycin, 8 mg/kg (Adjusted), Intravenous, Q48H  epoetin vince/vince-epbx, 10,000 Units, Subcutaneous, Weekly  hydrALAZINE, 10 mg, Oral, TID  insulin glargine, 4 Units, Subcutaneous, Nightly  insulin lispro, 2-9 Units, Subcutaneous, 4x Daily AC & at Bedtime  insulin lispro, 6 Units, Subcutaneous, TID With Meals  linagliptin, 5 mg, Oral, Daily  melatonin, 2.5 mg, Oral, Nightly  metoprolol succinate XL, 25 mg, Oral, Daily  pantoprazole, 40 mg, Oral, Q AM  piperacillin-tazobactam, 3.375 g, Intravenous, Q8H  sodium chloride, 3 mL, Intravenous, Q12H  tamsulosin, 0.4 mg, Oral, Daily      Continuous Infusions:Pharmacy Consult - Pharmacy to dose,         Vital signs in last 24 hours:  Temp:  [97.5 °F (36.4 °C)-98.8 °F (37.1 °C)] 98.2 °F (36.8 °C)  Heart Rate:  [60-67] 60  Resp:  [18-20] 18  BP: (112-140)/(48-60) 138/54    Intake/Output:    Intake/Output Summary (Last 24 hours) at 3/8/2025 1531  Last data filed at 3/7/2025 2135  Gross per 24 hour   Intake 240 ml   Output --   Net 240 ml       Exam:  /54 (BP Location: Left arm, Patient Position: Lying)   Pulse 60   Temp 98.2 °F (36.8 °C) (Oral)   Resp 18   Ht 185.4 cm (73\")   Wt 127 kg (279 lb 15.8 oz)   SpO2 98%   BMI 36.94 kg/m² "   Patient is examined using the personal protective equipment as per guidelines from infection control for this particular patient as enacted.  Hand washing was performed before and after patient interaction.  General Appearance:    Alert, cooperative, no distress, AAOx3                          Head:    Normocephalic, without obvious abnormality, atraumatic                           Eyes:    PERRL, pale conjunctiva                         Throat:   Lips, tongue, gums normal; oral mucosa pink and moist                           Neck:   Supple, symmetrical, trachea midline, no JVD                         Lungs:    Clear to auscultation bilaterally, respirations unlabored                 Chest Wall:    No tenderness or deformity left clavicular tenderness noted                          Heart:  S1-S2 irregular                  Abdomen:   Soft nontender                 Extremities: Right foot dressing chronic changes in the leg.                  Neurologic: Alert and oriented x 3       Data Review:    I reviewed the patient's new clinical results.  Results from last 7 days   Lab Units 03/08/25  0403 03/07/25  0449 03/06/25  0508 03/05/25  0604 03/04/25  0446 03/03/25  1408   WBC 10*3/mm3 11.08* 13.94* 15.51* 14.98* 14.09* 24.99*   HEMOGLOBIN g/dL 7.3* 7.3* 7.0* 6.2* 7.0* 9.5*   PLATELETS 10*3/mm3 238 215 193 182 185 253     Results from last 7 days   Lab Units 03/08/25  0403 03/07/25  0449 03/06/25  0508 03/05/25  0604 03/04/25  0446 03/03/25  1408   SODIUM mmol/L 138 138 135* 138 140 136   POTASSIUM mmol/L 4.1 3.9 4.1 3.9 4.2 4.9   CHLORIDE mmol/L 103 102 101 101 106 100   CO2 mmol/L 22.5 23.0 24.3 24.9 21.0* 16.0*   BUN mg/dL 76* 69* 67* 71* 69* 64*   CREATININE mg/dL 4.22* 3.97* 3.85* 3.91* 4.06* 4.22*   CALCIUM mg/dL 8.1* 8.0* 8.0* 7.8* 7.9* 8.8   GLUCOSE mg/dL 119* 149* 147* 126* 128* 197*     Microbiology Results (last 10 days)       Procedure Component Value - Date/Time    Wound Culture - Wound, Foot, Right  [[id number]]  (Abnormal) Collected: 03/06/25 1620    Lab Status: Preliminary result Specimen: Wound from Foot, Right Updated: 03/08/25 1007     Wound Culture Moderate growth (3+) Staphylococcus aureus, MRSA     Comment:   Methicillin resistant Staphylococcus aureus, Patient may be an isolation risk.         Light growth (2+) Normal Skin Ashley     Gram Stain Moderate (3+) WBCs per low power field      Few (2+) Gram positive cocci in pairs and clusters      Few (2+) Gram positive bacilli    Legionella Antigen, Urine - Urine, Urine, Clean Catch [[id number]]  (Normal) Collected: 03/06/25 1143    Lab Status: Final result Specimen: Urine, Clean Catch Updated: 03/06/25 1252     LEGIONELLA ANTIGEN, URINE Negative    S. Pneumo Ag Urine or CSF - Urine, Urine, Clean Catch [[id number]]  (Normal) Collected: 03/06/25 1143    Lab Status: Final result Specimen: Urine, Clean Catch Updated: 03/06/25 1252     Strep Pneumo Ag Negative    MRSA Screen, PCR (Inpatient) - Swab, Nares [[id number]]  (Normal) Collected: 03/03/25 2220    Lab Status: Final result Specimen: Swab from Nares Updated: 03/04/25 0013     MRSA PCR No MRSA Detected    Narrative:      The negative predictive value of this diagnostic test is high and should only be used to consider de-escalating anti-MRSA therapy. A positive result may indicate colonization with MRSA and must be correlated clinically.    Blood Culture - Blood, Arm, Left [[id number]]  (Normal) Collected: 03/03/25 1747    Lab Status: Preliminary result Specimen: Blood from Arm, Left Updated: 03/07/25 1815     Blood Culture No growth at 4 days    Blood Culture - Blood, Hand, Right [[id number]]  (Normal) Collected: 03/03/25 1745    Lab Status: Preliminary result Specimen: Blood from Hand, Right Updated: 03/07/25 1815     Blood Culture No growth at 4 days              Assessment:    Traumatic rhabdomyolysis    Arteriosclerosis of coronary artery    Essential hypertension    Type 2 diabetes mellitus with circulatory  disorder, without long-term current use of insulin    Hyperlipidemia    Charcot-Davida-Tooth disease-like deformity of foot    Fall    S/P CABG x 2    KILLIAN (acute kidney injury)    Chronic diastolic (congestive) heart failure    Stage 3b chronic kidney disease    Chronic anticoagulation    Persistent atrial fibrillation    Closed displaced fracture of left clavicle    Pneumonia due to infectious organism    ATN (acute tubular necrosis)    Contusion of left knee    Acute osteomyelitis of right foot    Diabetic foot infection    MRSA (methicillin resistant Staphylococcus aureus) infection  1-probable systemic illness due to  2-evolving ongoing infection with abscess of the right foot resulting in generalized weakness and  3-subsequent fall and fracture of left clavicle and left knee discomfort  4-diabetes mellitus  5-history of gout  6-sleep apnea  7-obesity  8-hypertension  9-severe anemia  10-acute on chronic renal failure  11-prior multiple diabetic foot infections and surgeries.     Recommendations/Discussions:  See my discussion and recommendations on 3/7/2025.  Based on the presence of MRSA in his right foot wound culture discussed with primary team about various options of coverage and given his renal insufficiency and ongoing use of Zosyn discussion took place with primary team about choice between linezolid and daptomycin.  Continue with Zosyn and start daptomycin, avoid concomitant use of statin while he is taking daptomycin, asked microbiology for sensitivity of the Staph aureus/MRSA for daptomycin and monitor mild toxicity with periodic CPK levels.  Management of other issues and plans for debridement of the right foot on 3/10/2025 per primary team and foot and ankle specialist.  Nicolasa Denny MD  3/8/2025  15:36 EST    Parts of this note may be an electronic transcription/translation of spoken language to printed text using the Dragon dictation system.

## 2025-03-08 NOTE — PROGRESS NOTES
[facility] Clinical Pharmacy Services: Daptomycin Consult    Pt Name: Rock Maciel Jr.   : 1945  Weight: 127 kg (279 lb 15.8 oz)  Antibiotic: Daptomycin  Indication:  osteomyelitis right foot    Relevant clinical data and objective history reviewed:    Past Medical History:   Diagnosis Date    Allergic rhinitis     Bronchitis     Coronary artery disease     Diabetes mellitus     DM (diabetes mellitus)     Encounter for annual health examination 2014    Annual Health Assessment    Gout     Hiatal hernia     History of MRSA infection     RIGHT FOOT     Hyperlipidemia     Hypertension     Murmur     Pneumothorax on right     Sleep apnea     pt wears CPAP at night    Wellness examination 2015    Annual Wellness Visit     Creatinine   Date Value Ref Range Status   2025 4.22 (H) 0.76 - 1.27 mg/dL Final   2025 3.97 (H) 0.76 - 1.27 mg/dL Final   2025 3.85 (H) 0.76 - 1.27 mg/dL Final   2022 1.44 (H) 0.73 - 1.18 mg/dL Final   2022 1.48 (H) 0.73 - 1.18 mg/dL Final   2021 2.79 (H) 0.73 - 1.18 mg/dL Final     BUN   Date Value Ref Range Status   2025 76 (H) 8 - 23 mg/dL Final   2022 39 (H) 8 - 26 mg/dL Final     Estimated Creatinine Clearance: 19.5 mL/min (A) (by C-G formula based on SCr of 4.22 mg/dL (H)).    Lab Results   Component Value Date    WBC 11.08 (H) 2025     Temp Readings from Last 3 Encounters:   25 97.5 °F (36.4 °C) (Oral)   25 96 °F (35.6 °C)   24 96.5 °F (35.8 °C)      Assessment/Plan  Pt has wound culture + MRSA/osteomyelitis. ID would like to avoid vancomycin due to renal function. SCr 4.22, CrCl 19, pt not on HD.  Ordered Daptomycin 8 mg/kg adjusted weight IV q48h for a total of 14 days, per consult. Will monitor and adjust if culture data or pertinent lab values indicate this is best for the patient.     Thank you for this consult and please contact pharmacy with any questions or concerns.     Laurita THOMAS  [facility]  Clinical Pharmacist

## 2025-03-08 NOTE — PROGRESS NOTES
"      FOLLOW UP NOTE      Name: Rock Maciel Jr. ADMIT: 3/3/2025   : 1945  PCP: Denise Dickson APRN    MRN: [id number] LOS: 5 days   AGE/SEX: 79 y.o. male  ROOM: [facility]     Date of Service: 3/8/2025                           CHIEF COMPLIANTS / REASON FOR FOLLOW UP                Subjective:      P continues to rest comfortably in bed with no acute distress.  Urine output not charted.  Reporting some pain in his right ankle.     Review of Systems:     Negative except as above       OBJECTIVE                                                                        Exam:  /58 (BP Location: Right arm, Patient Position: Lying)   Pulse 67   Temp 98.8 °F (37.1 °C) (Oral)   Resp 20   Ht 185.4 cm (73\")   Wt 127 kg (279 lb 15.8 oz)   SpO2 98%   BMI 36.94 kg/m²   Intake/Output last 3 shifts:  I/O last 3 completed shifts:  In: 240 [P.O.:240]  Out: 850 [Urine:850]  Intake/Output this shift:  No intake/output data recorded.    General Appearance: Chronically ill, pale appearing      Lungs:   Lungs largely clear, diminished at lung bases  Heart: RRR  Abdomen:  Soft, nontender  Extremities: Extremities wrapped, trace ankle edema  Neurologic:   Alert and oriented  Johnson with brown urine    Scheduled Meds:amiodarone, 200 mg, Oral, Daily  [Held by provider] apixaban, 2.5 mg, Oral, BID  vitamin C, 250 mg, Oral, Daily  atorvastatin, 20 mg, Oral, Nightly  bumetanide, 1 mg, Oral, BID Diuretics  cholecalciferol, 1,000 Units, Oral, Daily  [Held by provider] clopidogrel, 75 mg, Oral, Daily  epoetin vince/vince-epbx, 10,000 Units, Subcutaneous, Weekly  ferric gluconate, 250 mg, Intravenous, Daily  hydrALAZINE, 10 mg, Oral, TID  insulin glargine, 3 Units, Subcutaneous, Nightly  insulin lispro, 2-9 Units, Subcutaneous, 4x Daily AC & at Bedtime  insulin lispro, 6 Units, Subcutaneous, TID With Meals  linagliptin, 5 mg, Oral, Daily  melatonin, 2.5 mg, Oral, Nightly  metoprolol succinate XL, 25 mg, Oral, Daily  pantoprazole, " 40 mg, Oral, Q AM  piperacillin-tazobactam, 3.375 g, Intravenous, Q8H  sodium chloride, 3 mL, Intravenous, Q12H  tamsulosin, 0.4 mg, Oral, Daily      Continuous Infusions:     PRN Meds:  acetaminophen **OR** acetaminophen **OR** acetaminophen    artificial tears    senna-docusate sodium **AND** polyethylene glycol **AND** bisacodyl **AND** bisacodyl    cadexomer iodine    dextrose    dextrose    famotidine    glucagon (human recombinant)    HYDROcodone-acetaminophen    Morphine **AND** naloxone    nitroglycerin    ondansetron ODT **OR** ondansetron    sodium chloride    sodium chloride         Data Review:                                                                           Labs reviewed        Imaging:                                                                           Radiology reviewed           ASSESSMENT:                                                                                Traumatic rhabdomyolysis    Arteriosclerosis of coronary artery    Essential hypertension    Type 2 diabetes mellitus with circulatory disorder, without long-term current use of insulin    Hyperlipidemia    Charcot-Davida-Tooth disease-like deformity of foot    Fall    S/P CABG x 2    KILLIAN (acute kidney injury)    Chronic diastolic (congestive) heart failure    Stage 3b chronic kidney disease    Chronic anticoagulation    Persistent atrial fibrillation    Closed displaced fracture of left clavicle    Pneumonia due to infectious organism    ATN (acute tubular necrosis)    Contusion of left knee    Acute osteomyelitis of right foot    Diabetic foot infection         KILLIAN: likely ATN related to rhabdomyolysis, prerenal insult related to diuretics etc. stabilizing with good urine output.  At this point, I remain concerned for CKD progression with significant baseline chronicity, recurrent KILLIAN, uncontrolled diabetes etc.  Rhabdomyolysis related to fall: Resolved.  Lower extremity osteomyelitis/cellulitis  Edema feet  CKD stage  III-IV: Baseline creatinine  1.8-2.6 mg/dL with baseline GFR around 25 mL/min  Acute urinary retention requiring Johnson catheter placement.  Clavicle fracture  A-fib with controlled rates.  History of HFpEF with pulmonary hypertension: Slightly volume expanded.  Severe anemia in CKD: TSAT 11%.  Cannot rule out occult GI bleed given anticoagulation.          PLAN:                                                                            Creatinine stable with good urine output.  Continue maintenance diuretic.  Do worry about progression of CKD to stage V with no urgent need for RRT yet.  Continue to evaluate daily for indications for RRT.  Podiatry following for foot osteomyelitis.  Antibiotics dosed for  CrCl<15 mL/min.  Follow-up with ID.  Johnson management per urology.  Continue weekly EPO.     Tiesha Mccrary MD  [facility]  3/8/2025  07:18 EST

## 2025-03-08 NOTE — PROGRESS NOTES
[facility] Medicine Services  PROGRESS NOTE    Patient Name: Rock Maciel Jr.  : 1945  MRN: [id number]    Date of Admission: 3/3/2025  Primary Care Physician: Denise Dickson APRN    Subjective   Subjective     CC:  Follow-up for recent fall    Subjective:  Patient is frustrated about his foot.  He has only a little bit of pain in his foot due to neuropathy.  Overall he feels he has been doing little better on the antibiotics.  Less fevers today.  He agrees to surgical intervention with podiatry team.  He does note still some pain in his left knee and is awaiting MRI.      Review of Systems    No current fevers or chills  No current nausea, vomiting, or diarrhea  No current chest pain or palpitations     Objective   Objective     Vital Signs:   Temp:  [97.5 °F (36.4 °C)-98.8 °F (37.1 °C)] 97.5 °F (36.4 °C)  Heart Rate:  [64-68] 64  Resp:  [18-20] 18  BP: (112-146)/(48-60) 140/60        Physical Exam:    Constitutional: Awake, alert, elderly and chronically ill-appearing  HENT: Moist membranes moist, neck supple  Respiratory: No cough or wheezes, normal respirations, nonlabored breathing   Cardiovascular: Pulse rate is normal, palpable radial pulses  Gastrointestinal:  Soft, nontender, nondistended  Musculoskeletal: Left knee swelling and bruising, significantly obese BMI is 36, mild lower extremity edema, physically debilitated in appearance, frail  Psychiatric: Appropriate affect, cooperative, conversational  Neurologic: No slurred speech or facial droop, follows commands  Skin: Right foot toe wound is present and unchanged from admission, currently with bandage in place, some bruising noted particularly in the bilateral lower extremity around the knee regions otherwise somewhat pale, no rashes or jaundice, warm          Results Reviewed:  Results from last 7 days   Lab Units 25  0403 25  0449 25  0508 25  0446 25  1408   WBC 10*3/mm3 11.08* 13.94* 15.51*   < >  24.99*   HEMOGLOBIN g/dL 7.3* 7.3* 7.0*   < > 9.5*   HEMATOCRIT % 22.9* 22.3* 21.6*   < > 29.4*   PLATELETS 10*3/mm3 238 215 193   < > 253   INR   --   --   --   --  2.00*   PROCALCITONIN ng/mL  --   --   --   --  4.80*    < > = values in this interval not displayed.     Results from last 7 days   Lab Units 03/08/25  0403 03/07/25  0449 03/06/25  0508 03/05/25  0604 03/04/25  0446   SODIUM mmol/L 138 138 135* 138 140   POTASSIUM mmol/L 4.1 3.9 4.1 3.9 4.2   CHLORIDE mmol/L 103 102 101 101 106   CO2 mmol/L 22.5 23.0 24.3 24.9 21.0*   BUN mg/dL 76* 69* 67* 71* 69*   CREATININE mg/dL 4.22* 3.97* 3.85* 3.91* 4.06*   GLUCOSE mg/dL 119* 149* 147* 126* 128*   CALCIUM mg/dL 8.1* 8.0* 8.0* 7.8* 7.9*   ALK PHOS U/L  --   --  220* 163* 94   ALT (SGPT) U/L  --   --  61* 53* 30   AST (SGOT) U/L  --   --  63* 82* 79*     Estimated Creatinine Clearance: 19.5 mL/min (A) (by C-G formula based on SCr of 4.22 mg/dL (H)).    Microbiology Results Abnormal       Procedure Component Value - Date/Time    Wound Culture - Wound, Foot, Right [[id number]]  (Abnormal) Collected: 03/06/25 1620    Lab Status: Preliminary result Specimen: Wound from Foot, Right Updated: 03/08/25 1007     Wound Culture Moderate growth (3+) Staphylococcus aureus, MRSA     Comment:   Methicillin resistant Staphylococcus aureus, Patient may be an isolation risk.         Light growth (2+) Normal Skin Ashley     Gram Stain Moderate (3+) WBCs per low power field      Few (2+) Gram positive cocci in pairs and clusters      Few (2+) Gram positive bacilli            Imaging Results (Last 24 Hours)       Procedure Component Value Units Date/Time    XR Knee 1 or 2 View Left [[id number]] Collected: 03/07/25 1435     Updated: 03/07/25 1441    Narrative:      XR KNEE 1 OR 2 VW LEFT-     DATE OF EXAM: 3/7/2025 1:51 PM     INDICATION: Knee pain and swelling status post fall 5 days ago.     COMPARISON: Left femur radiographs 5/6/2019.     TECHNIQUE: AP and lateral views of the knee  were obtained.     FINDINGS:  The lateral views are limited by oblique patient positioning. Diffuse  osteopenia. Ossifications superior to the patella could represent  osteochondral bodies in the suprapatellar joint space, partial  ossification of the quadriceps tendon, and/or avulsion fragments from  the superior patella. Small inferior patellar enthesophyte. Severe DJD  in the medial compartment with associated mild varus angulation. Lateral  and patellofemoral joint spaces are fairly well-maintained. Moderate to  large knee joint effusion. Severe vascular calcifications.       Impression:         1. Diffuse osteopenia and moderate to large knee joint effusion with  ossification superior to the patella that could represent osteochondral  bodies suprapatellar joint space, partial ossification of the quadriceps  tendon, and/or avulsion fracture fragments from the superior patella.  Recommend correlating for quadriceps tendon integrity.  3. Severe DJD in the medial compartment.     This report was finalized on 3/7/2025 2:38 PM by Alverto Jones MD on  Workstation: [id number]       MRI Foot Right Without Contrast [[id number]] Collected: 03/07/25 0929     Updated: 03/07/25 1305    Narrative:      MRI RIGHT FOREFOOT WITHOUT CONTRAST     HISTORY: Ulcer. Possible abscess.     TECHNIQUE: MRI includes axial T1, STIR as well as coronal T1, T2  fat-sat, and sagittal PD  fat-saturated sequences through the forefoot.     COMPARISON: Foot x-rays 03/06/2025.     FINDINGS: Lateral forefoot soft tissue wound is centered lateral to the  fifth metatarsal and communicates with a sinus tract that contains fluid  signal consistent with abscess formation that surrounds the distal shaft  and head of the fourth metatarsal in patient with previous amputation of  the fourth toe at the fourth MTP joint. Abscess contains several bubbles  of soft tissue air and there is also air within the surrounding soft  tissues consistent with infection.  The fluid signal surrounding the  distal fourth metatarsal shaft measures approximately 3.5 cm in height  by 2.3 cm transverse and extends 3.5 cm in AP dimension. There are  bubbles of air within the head of the fourth metatarsal associated with  areas of cortical bone loss and osteomyelitis of the distal fourth  metatarsal over the distal shaft and head.     There is a chronic thin linear band of calcification extending distal to  the partially amputated fifth metatarsal. Abscess contacts this  calcification which appears to be infected though there is no evidence  for infection of the cancellous bone of the remaining proximal fifth  metatarsal.     No evidence for osteomyelitis of the third through fifth metatarsals or  toes. There is bony fusion at the tarsometatarsal joints, cuneiform  joints. Generalized muscular atrophy with fat replacement. Myositis  surrounding the distal fourth metatarsal. There is generalized soft  tissue swelling with edema and swelling in subcutaneous fat about the  forefoot consistent with cellulitis.       Impression:      1. Soft tissue wound lateral to the fifth metatarsal communicates with  fluid signal/abscess that contains bubbles of air and surrounds the  distal fourth metatarsal where there is osteomyelitis. Fluid signal and  suspected abscess also contacts thin band of dystrophic calcification  extending distal to the amputated segment of the fifth metatarsal  without evidence for osteomyelitis of the cancellous bone of the  remaining proximal fifth metatarsal.  2. Generalized forefoot cellulitis.  3. Chronic muscular atrophy consistent with chronic neuropathy.     This report was finalized on 3/7/2025 1:02 PM by Freddy Guzman M.D on  Workstation: [id number]               Results for orders placed during the hospital encounter of 10/03/24    Adult Transthoracic Echo Complete W/ Cont if Necessary Per Protocol    Interpretation Summary    Left ventricular ejection fraction  appears to be 61 - 65%.    Left ventricular diastolic function was indeterminate.    The left atrial cavity is moderately dilated.    Left atrial volume is moderately increased.    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    Estimated right ventricular systolic pressure from tricuspid regurgitation is moderately elevated (45-55 mmHg).      I have reviewed the medications:  Scheduled Meds:amiodarone, 200 mg, Oral, Daily  [Held by provider] apixaban, 2.5 mg, Oral, BID  vitamin C, 250 mg, Oral, Daily  atorvastatin, 20 mg, Oral, Nightly  bumetanide, 1 mg, Oral, BID Diuretics  cholecalciferol, 1,000 Units, Oral, Daily  [Held by provider] clopidogrel, 75 mg, Oral, Daily  epoetin vince/vince-epbx, 10,000 Units, Subcutaneous, Weekly  hydrALAZINE, 10 mg, Oral, TID  insulin glargine, 3 Units, Subcutaneous, Nightly  insulin lispro, 2-9 Units, Subcutaneous, 4x Daily AC & at Bedtime  insulin lispro, 6 Units, Subcutaneous, TID With Meals  linagliptin, 5 mg, Oral, Daily  melatonin, 2.5 mg, Oral, Nightly  metoprolol succinate XL, 25 mg, Oral, Daily  pantoprazole, 40 mg, Oral, Q AM  piperacillin-tazobactam, 3.375 g, Intravenous, Q8H  sodium chloride, 3 mL, Intravenous, Q12H  tamsulosin, 0.4 mg, Oral, Daily      Continuous Infusions:     PRN Meds:.  acetaminophen **OR** acetaminophen **OR** acetaminophen    artificial tears    senna-docusate sodium **AND** polyethylene glycol **AND** bisacodyl **AND** bisacodyl    cadexomer iodine    dextrose    dextrose    famotidine    glucagon (human recombinant)    HYDROcodone-acetaminophen    nitroglycerin    ondansetron ODT **OR** ondansetron    sodium chloride    sodium chloride    Assessment & Plan   Assessment & Plan     Active Hospital Problems    Diagnosis  POA    **Traumatic rhabdomyolysis [T79.6XXA]  Yes    MRSA (methicillin resistant Staphylococcus aureus) infection [A49.02]  Yes    Contusion of left knee [S80.02XA]  Yes    Acute osteomyelitis of right foot [M86.071]  Yes     Diabetic foot infection [E11.628, L08.9]  Yes    ATN (acute tubular necrosis) [N17.0]  Yes    Closed displaced fracture of left clavicle [S42.002A]  Yes    Pneumonia due to infectious organism [J18.9]  Yes    Persistent atrial fibrillation [I48.19]  Yes    Chronic anticoagulation [Z79.01]  Not Applicable    Stage 3b chronic kidney disease [N18.32]  Yes    Chronic diastolic (congestive) heart failure [I50.32]  Yes    KILLIAN (acute kidney injury) [N17.9]  Yes    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        Brief Hospital Course to date:  Rock MENDOZA Raheem Burgos is a 79 y.o. male   presents the hospital after fall at home while using his stair chair lift with closed head injury and injury to the left shoulder causing left clavicle fracture.  He was found to have rhabdomyolysis and acute renal failure.     Discussion/plan for today:   Culture from the right foot now growing MRSA.  We will need to adjust antibiotic coverage however in the setting of significant KILLIAN I will touch base with infectious disease to see what they recommend.  Podiatry planning potential surgical intervention.  MRI of the left knee today due to extensive swelling.  Appreciate orthopedic recommendations.  Eliquis on hold for potential surgery as is Plavix.  Risk versus benefits of holding this medications were discussed with the patient.  Creatinine worse today.  Anemia of the same.  Leukocytosis is trending down.   Blood pressure is reasonably normotensive.  Continue Johnson catheter and plan eventual voiding trial perhaps the day of discharge.  IV iron initiated for anemia.   No bleeding noted likely some blood loss due to bruising with rhabdomyolysis.  ATN and monitoring renal function.   Monitor blood counts for now received transfusion on 3/5 and may need eventual transfusion again.  Outpatient orthopedic follow-up for clavicle fracture.  Continue fall prevention and PT.  Glucose reviewed and insulin was further adjusted.  Treatment plan discussed with patient is in agreement.    Traumatic rhabdomyolysis due to fall and found down on the floor with left knee and shoulder contusion:  Treat with IV fluid.  Nephrology consulted to assist.  Trend CK.  Supportive care and symptom treatment.    Osteomyelitis of the right foot with abscess due to MRSA:  Infectious disease following.  Antibiotic coverage.  Podiatry following.     Acute renal failure due to ATN with CKD 3B with mild metabolic acidosis:  Nephrology consulted.  Clinically appears dry and likely prerenal, and ATN.  Hold nephrotoxins and renally dose medications.     Possible pneumonia:  Significantly elevated leukocytosis, right lower lobe infiltrate, occasional cough.  Suspect possible aspiration pneumonia while patient was down.  Allergy to Ceclor noted.  Patient initiated on vancomycin and Zosyn in emergency room.  Patient treated with Zosyn.  Respiratory status has improved    Toe wound:  Noted present on admission.  Consult podiatry to weigh in and assist with treatment recommendations.    Anemia:  Secondary to dilution.  Anemia lab workup.  Blood transfusion 3/5/2025.     Clavicle fracture:  Orthopedic evaluated and will follow-up in clinic.  On x-ray images reviewed and comminuted distal clavicle fracture with displacement of the clavicle shaft     Fall and physical debility and Charcot foot deformity: PT OT evaluation.  Fall prevention.  PT OT recommend SNF     Type 2 diabetes: Monitor blood sugar and adjust insulin as needed.  Tradjenta     Atrial fibrillation: Eliquis anticoagulation.  Continue beta-blocker.  Continue  home amiodarone.     Hyperlipidemia: Continue statin.  Stable.     Treatment plan discussed with patient who is in agreement.     DVT prophylaxis: Anticoagulant            Disposition: SNF    CODE STATUS:   Code Status and Medical Interventions: CPR (Attempt to Resuscitate); Full Support   Ordered at: 03/03/25 1739     Code Status (Patient has no pulse and is not breathing):    CPR (Attempt to Resuscitate)     Medical Interventions (Patient has pulse or is breathing):    Full Support     Level Of Support Discussed With:    Patient       Bishop Chakraborty MD  03/08/25

## 2025-03-09 ENCOUNTER — APPOINTMENT (OUTPATIENT)
Dept: GENERAL RADIOLOGY | Facility: HOSPITAL | Age: 80
End: 2025-03-09
Payer: MEDICARE

## 2025-03-09 LAB
ANION GAP SERPL CALCULATED.3IONS-SCNC: 14 MMOL/L (ref 5–15)
BUN SERPL-MCNC: 89 MG/DL (ref 8–23)
BUN/CREAT SERPL: 17.6 (ref 7–25)
CALCIUM SPEC-SCNC: 8.1 MG/DL (ref 8.6–10.5)
CHLORIDE SERPL-SCNC: 101 MMOL/L (ref 98–107)
CK SERPL-CCNC: 39 U/L (ref 20–200)
CO2 SERPL-SCNC: 23 MMOL/L (ref 22–29)
CREAT SERPL-MCNC: 5.05 MG/DL (ref 0.76–1.27)
DEPRECATED RDW RBC AUTO: 49.7 FL (ref 37–54)
EGFRCR SERPLBLD CKD-EPI 2021: 10.9 ML/MIN/1.73
ERYTHROCYTE [DISTWIDTH] IN BLOOD BY AUTOMATED COUNT: 14.5 % (ref 12.3–15.4)
GLUCOSE BLDC GLUCOMTR-MCNC: 146 MG/DL (ref 70–130)
GLUCOSE BLDC GLUCOMTR-MCNC: 197 MG/DL (ref 70–130)
GLUCOSE BLDC GLUCOMTR-MCNC: 221 MG/DL (ref 70–130)
GLUCOSE BLDC GLUCOMTR-MCNC: 259 MG/DL (ref 70–130)
GLUCOSE SERPL-MCNC: 131 MG/DL (ref 65–99)
HCT VFR BLD AUTO: 24.7 % (ref 37.5–51)
HGB BLD-MCNC: 7.6 G/DL (ref 13–17.7)
MCH RBC QN AUTO: 28.8 PG (ref 26.6–33)
MCHC RBC AUTO-ENTMCNC: 30.8 G/DL (ref 31.5–35.7)
MCV RBC AUTO: 93.6 FL (ref 79–97)
PLATELET # BLD AUTO: 261 10*3/MM3 (ref 140–450)
PMV BLD AUTO: 9.6 FL (ref 6–12)
POTASSIUM SERPL-SCNC: 4.3 MMOL/L (ref 3.5–5.2)
RBC # BLD AUTO: 2.64 10*6/MM3 (ref 4.14–5.8)
SODIUM SERPL-SCNC: 138 MMOL/L (ref 136–145)
WBC NRBC COR # BLD AUTO: 9.95 10*3/MM3 (ref 3.4–10.8)

## 2025-03-09 PROCEDURE — 99221 1ST HOSP IP/OBS SF/LOW 40: CPT | Performed by: INTERNAL MEDICINE

## 2025-03-09 PROCEDURE — 63710000001 INSULIN LISPRO (HUMAN) PER 5 UNITS: Performed by: INTERNAL MEDICINE

## 2025-03-09 PROCEDURE — 25010000002 PIPERACILLIN SOD-TAZOBACTAM PER 1 G: Performed by: INTERNAL MEDICINE

## 2025-03-09 PROCEDURE — 97530 THERAPEUTIC ACTIVITIES: CPT

## 2025-03-09 PROCEDURE — 82550 ASSAY OF CK (CPK): CPT | Performed by: STUDENT IN AN ORGANIZED HEALTH CARE EDUCATION/TRAINING PROGRAM

## 2025-03-09 PROCEDURE — 36415 COLL VENOUS BLD VENIPUNCTURE: CPT | Performed by: INTERNAL MEDICINE

## 2025-03-09 PROCEDURE — 71045 X-RAY EXAM CHEST 1 VIEW: CPT

## 2025-03-09 PROCEDURE — 85027 COMPLETE CBC AUTOMATED: CPT | Performed by: INTERNAL MEDICINE

## 2025-03-09 PROCEDURE — 63710000001 INSULIN GLARGINE PER 5 UNITS: Performed by: INTERNAL MEDICINE

## 2025-03-09 PROCEDURE — 82948 REAGENT STRIP/BLOOD GLUCOSE: CPT

## 2025-03-09 PROCEDURE — 80048 BASIC METABOLIC PNL TOTAL CA: CPT | Performed by: INTERNAL MEDICINE

## 2025-03-09 RX ADMIN — PANTOPRAZOLE SODIUM 40 MG: 40 TABLET, DELAYED RELEASE ORAL at 05:48

## 2025-03-09 RX ADMIN — AMIODARONE HYDROCHLORIDE 200 MG: 200 TABLET ORAL at 08:46

## 2025-03-09 RX ADMIN — INSULIN LISPRO 2 UNITS: 100 INJECTION, SOLUTION INTRAVENOUS; SUBCUTANEOUS at 22:42

## 2025-03-09 RX ADMIN — HYDRALAZINE HYDROCHLORIDE 10 MG: 10 TABLET ORAL at 08:46

## 2025-03-09 RX ADMIN — ATORVASTATIN CALCIUM 20 MG: 20 TABLET, FILM COATED ORAL at 21:20

## 2025-03-09 RX ADMIN — HYDRALAZINE HYDROCHLORIDE 10 MG: 10 TABLET ORAL at 15:32

## 2025-03-09 RX ADMIN — INSULIN GLARGINE 5 UNITS: 100 INJECTION, SOLUTION SUBCUTANEOUS at 22:44

## 2025-03-09 RX ADMIN — Medication 1000 UNITS: at 08:46

## 2025-03-09 RX ADMIN — OXYCODONE HYDROCHLORIDE AND ACETAMINOPHEN 250 MG: 500 TABLET ORAL at 08:46

## 2025-03-09 RX ADMIN — HYDRALAZINE HYDROCHLORIDE 10 MG: 10 TABLET ORAL at 21:19

## 2025-03-09 RX ADMIN — Medication 1 APPLICATION: at 05:48

## 2025-03-09 RX ADMIN — INSULIN LISPRO 6 UNITS: 100 INJECTION, SOLUTION INTRAVENOUS; SUBCUTANEOUS at 12:00

## 2025-03-09 RX ADMIN — INSULIN LISPRO 6 UNITS: 100 INJECTION, SOLUTION INTRAVENOUS; SUBCUTANEOUS at 08:46

## 2025-03-09 RX ADMIN — Medication 3 ML: at 08:47

## 2025-03-09 RX ADMIN — INSULIN LISPRO 6 UNITS: 100 INJECTION, SOLUTION INTRAVENOUS; SUBCUTANEOUS at 18:43

## 2025-03-09 RX ADMIN — Medication 2.5 MG: at 21:20

## 2025-03-09 RX ADMIN — BISACODYL 10 MG: 10 SUPPOSITORY RECTAL at 21:20

## 2025-03-09 RX ADMIN — LINAGLIPTIN 5 MG: 5 TABLET, FILM COATED ORAL at 08:46

## 2025-03-09 RX ADMIN — HYDROCODONE BITARTRATE AND ACETAMINOPHEN 0.5 TABLET: 5; 325 TABLET ORAL at 08:46

## 2025-03-09 RX ADMIN — PIPERACILLIN AND TAZOBACTAM 4.5 G: 4; .5 INJECTION, POWDER, FOR SOLUTION INTRAVENOUS at 18:43

## 2025-03-09 RX ADMIN — INSULIN LISPRO 4 UNITS: 100 INJECTION, SOLUTION INTRAVENOUS; SUBCUTANEOUS at 18:43

## 2025-03-09 RX ADMIN — METOPROLOL SUCCINATE 25 MG: 25 TABLET, EXTENDED RELEASE ORAL at 08:46

## 2025-03-09 RX ADMIN — BUMETANIDE 1 MG: 1 TABLET ORAL at 08:46

## 2025-03-09 RX ADMIN — PIPERACILLIN AND TAZOBACTAM 3.38 G: 3; .375 INJECTION, POWDER, FOR SOLUTION INTRAVENOUS at 08:45

## 2025-03-09 RX ADMIN — TAMSULOSIN HYDROCHLORIDE 0.4 MG: 0.4 CAPSULE ORAL at 08:46

## 2025-03-09 RX ADMIN — INSULIN LISPRO 4 UNITS: 100 INJECTION, SOLUTION INTRAVENOUS; SUBCUTANEOUS at 11:30

## 2025-03-09 NOTE — PROGRESS NOTES
[facility] Medicine Services  PROGRESS NOTE    Patient Name: Rock Maciel Jr.  : 1945  MRN: [id number]    Date of Admission: 3/3/2025  Primary Care Physician: Denise Dickson APRN    Subjective   Subjective     CC:  Follow-up for recent fall    Subjective:  Patient is frustrated with his left knee pain.  He is frustrated with his right foot infection and is frustrated with his kidney failure.  No other new complaints.  He notes continued poor urine output.      Review of Systems    No current fevers or chills  No current nausea, vomiting, or diarrhea  No current chest pain or palpitations     Objective   Objective     Vital Signs:   Temp:  [97.7 °F (36.5 °C)-98.6 °F (37 °C)] 97.7 °F (36.5 °C)  Heart Rate:  [60-66] 63  Resp:  [18-20] 18  BP: (136-146)/(54-59) 146/59        Physical Exam:    Constitutional: Awake, alert, elderly and chronically ill-appearing  HENT: Moist membranes moist, neck supple  Respiratory: No cough or wheezes, normal respirations, nonlabored breathing   Cardiovascular: Pulse rate is normal, palpable radial pulses  Gastrointestinal:  Soft, nontender, nondistended  Musculoskeletal: Left knee swelling and bruising, significantly obese BMI is 36, mild lower extremity edema, physically debilitated in appearance, frail  Psychiatric: Appropriate affect, cooperative, conversational  Neurologic: No slurred speech or facial droop, follows commands  Skin: Right foot toe wound is present and unchanged from admission, currently with bandage in place, some bruising noted particularly in the bilateral lower extremity around the knee regions otherwise somewhat pale, no rashes or jaundice, warm          Results Reviewed:  Results from last 7 days   Lab Units 25  0714 25  0403 25  0449 25  0446 25  1408   WBC 10*3/mm3 9.95 11.08* 13.94*   < > 24.99*   HEMOGLOBIN g/dL 7.6* 7.3* 7.3*   < > 9.5*   HEMATOCRIT % 24.7* 22.9* 22.3*   < > 29.4*   PLATELETS 10*3/mm3 261  238 215   < > 253   INR   --   --   --   --  2.00*   PROCALCITONIN ng/mL  --   --   --   --  4.80*    < > = values in this interval not displayed.     Results from last 7 days   Lab Units 03/09/25  0714 03/08/25  0403 03/07/25  0449 03/06/25  0508 03/05/25  0604 03/04/25  0446   SODIUM mmol/L 138 138 138 135* 138 140   POTASSIUM mmol/L 4.3 4.1 3.9 4.1 3.9 4.2   CHLORIDE mmol/L 101 103 102 101 101 106   CO2 mmol/L 23.0 22.5 23.0 24.3 24.9 21.0*   BUN mg/dL 89* 76* 69* 67* 71* 69*   CREATININE mg/dL 5.05* 4.22* 3.97* 3.85* 3.91* 4.06*   GLUCOSE mg/dL 131* 119* 149* 147* 126* 128*   CALCIUM mg/dL 8.1* 8.1* 8.0* 8.0* 7.8* 7.9*   ALK PHOS U/L  --   --   --  220* 163* 94   ALT (SGPT) U/L  --   --   --  61* 53* 30   AST (SGOT) U/L  --   --   --  63* 82* 79*     Estimated Creatinine Clearance: 16.3 mL/min (A) (by C-G formula based on SCr of 5.05 mg/dL (H)).    Microbiology Results Abnormal       Procedure Component Value - Date/Time    Wound Culture - Wound, Foot, Right [[id number]]  (Abnormal) Collected: 03/06/25 1620    Lab Status: Preliminary result Specimen: Wound from Foot, Right Updated: 03/08/25 1007     Wound Culture Moderate growth (3+) Staphylococcus aureus, MRSA     Comment:   Methicillin resistant Staphylococcus aureus, Patient may be an isolation risk.         Light growth (2+) Normal Skin Ashley     Gram Stain Moderate (3+) WBCs per low power field      Few (2+) Gram positive cocci in pairs and clusters      Few (2+) Gram positive bacilli            Imaging Results (Last 24 Hours)       Procedure Component Value Units Date/Time    MRI Knee Left Without Contrast [[id number]] Collected: 03/08/25 1735     Updated: 03/08/25 1755    Narrative:      MRI KNEE LEFT  WO CONTRAST-     Date of Exam: 3/8/2025 3:09 PM     Indication: Increased pain and swelling status post fall. Equivocal exam  regarding possible quadriceps tendon tear.     Comparison: Radiographs 3/7/2025.     Technique: Multiplanar, multisequence MR imaging  of the knee was  performed without contrast.     FINDINGS:     Soft tissues: Partially imaged moderate to large joint effusion with  diffuse synovial thickening and/or intra-articular debris. Trace fluid  extends into a thin popliteal cyst, measuring 7 cm craniocaudal. Patchy  soft tissue edema, greatest anteriorly. Multiple postoperative foci of  susceptibility artifact in the soft tissues at the anterior distal thigh  and knee. No solid soft tissue mass.     Menisci: Complex tear of the medial meniscus body and posterior horn  with a 7 mm meniscal body flap flipped into the medial femoral gutter.  Longitudinal horizontal oblique tear of the lateral meniscus posterior  body and posterior horn extending to the inferior articular surface with  a full-thickness or high-grade near full-thickness radial tear of the  lateral meniscus posterior root.     Cruciate Ligaments: The ACL is intact.  The PCL is intact.     Collateral ligaments: The MCL is intact.  The LCL complex is intact.     Extensor Mechanism: Evidence of prior distal quadriceps tendon repair  with partial ossification of the distal quadriceps tendon. Complex  recurrent moderate to high-grade partial-thickness tear/avulsion of the  distal quadriceps tendon with up to an estimated 2.5 cm proximal  retraction of the torn deep medial tendon fibers. Likely chronic partial  ossification of the otherwise intact proximal patellar tendon. Severe  diffuse muscular fatty atrophy.     Articular cartilage: Diffuse grade II-III chondromalacia in the  patellofemoral compartment with multifocal full-thickness/near  full-thickness chondral fissuring at the lateral patellar facet and  lateral trochlea. Diffuse grade II-III chondromalacia in the medial  compartment with focal full-thickness or near full-thickness chondral  fissuring at the medial weightbearing portion of the medial femoral  condyle and medial tibial plateau and a small 6 mm suspected unstable  osteochondral  fragment at the medial weightbearing portion of the medial  femoral condyle. Diffuse grade II chondromalacia in the lateral  compartment.     Bones: Suspected small 6 mm chronic unstable osteochondral fragment at  the medial weightbearing portion of the medial femoral condyle. Tiny  subchondral cystic changes at the patella and trochlea related overlying  chondromalacia. Postoperative changes in the patella. No acute fracture.  No concerning bone marrow lesions or marrow replacing process.       Impression:         1. Evidence of prior distal quadriceps tendon repair with a complex  recurrent moderate to high-grade partial-thickness tear/avulsion with  mild proximal retraction and partial ossification of the distal  quadriceps tendon. Recommend comparison to prior outside imaging if  available. Severe diffuse muscular fatty atrophy.  2. Tricompartmental chondromalacia/DJD, most severe in the medial and  patellofemoral compartments, with a small 6 mm suspected unstable  osteochondral fragment of the medial weightbearing portion of the medial  femoral condyle.  3. Complex tear of the medial meniscus body and posterior horn with a 7  mm meniscal body flap flipped into the medial femoral gutter.  4. Longitudinal horizontal oblique tear of the lateral meniscus  posterior body and posterior horn extending to the intra-articular  surface with a full-thickness or high-grade near full-thickness radial  tear of the lateral meniscus posterior horn.  5. Partially imaged moderate to large joint effusion with diffuse  synovial thickening and/or intra-articular debris and a thin popliteal  cyst           This report was finalized on 3/8/2025 5:52 PM by Alverto Jones MD on  Workstation: [id number]               Results for orders placed during the hospital encounter of 10/03/24    Adult Transthoracic Echo Complete W/ Cont if Necessary Per Protocol    Interpretation Summary    Left ventricular ejection fraction appears to be 61 -  65%.    Left ventricular diastolic function was indeterminate.    The left atrial cavity is moderately dilated.    Left atrial volume is moderately increased.    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    Estimated right ventricular systolic pressure from tricuspid regurgitation is moderately elevated (45-55 mmHg).      I have reviewed the medications:  Scheduled Meds:amiodarone, 200 mg, Oral, Daily  [Held by provider] apixaban, 2.5 mg, Oral, BID  vitamin C, 250 mg, Oral, Daily  atorvastatin, 20 mg, Oral, Nightly  bumetanide, 1 mg, Oral, BID Diuretics  cholecalciferol, 1,000 Units, Oral, Daily  [Held by provider] clopidogrel, 75 mg, Oral, Daily  DAPTOmycin, 8 mg/kg (Adjusted), Intravenous, Q48H  epoetin vince/vince-epbx, 10,000 Units, Subcutaneous, Weekly  hydrALAZINE, 10 mg, Oral, TID  insulin glargine, 4 Units, Subcutaneous, Nightly  insulin lispro, 2-9 Units, Subcutaneous, 4x Daily AC & at Bedtime  insulin lispro, 6 Units, Subcutaneous, TID With Meals  linagliptin, 5 mg, Oral, Daily  melatonin, 2.5 mg, Oral, Nightly  metoprolol succinate XL, 25 mg, Oral, Daily  pantoprazole, 40 mg, Oral, Q AM  piperacillin-tazobactam, 4.5 g, Intravenous, Q12H  sodium chloride, 3 mL, Intravenous, Q12H  tamsulosin, 0.4 mg, Oral, Daily      Continuous Infusions:Pharmacy Consult - Pharmacy to dose,         PRN Meds:.  acetaminophen **OR** acetaminophen **OR** acetaminophen    artificial tears    senna-docusate sodium **AND** polyethylene glycol **AND** bisacodyl **AND** bisacodyl    cadexomer iodine    dextrose    dextrose    famotidine    glucagon (human recombinant)    HYDROcodone-acetaminophen    nitroglycerin    ondansetron ODT **OR** ondansetron    Pharmacy Consult - Pharmacy to dose    sodium chloride    sodium chloride    Assessment & Plan   Assessment & Plan     Active Hospital Problems    Diagnosis  POA    **Traumatic rhabdomyolysis [T79.6XXA]  Yes    MRSA (methicillin resistant Staphylococcus aureus) infection [A49.02]   Yes    Contusion of left knee [S80.02XA]  Yes    Acute osteomyelitis of right foot [M86.071]  Yes    Diabetic foot infection [E11.628, L08.9]  Yes    ATN (acute tubular necrosis) [N17.0]  Yes    Closed displaced fracture of left clavicle [S42.002A]  Yes    Pneumonia due to infectious organism [J18.9]  Yes    Persistent atrial fibrillation [I48.19]  Yes    Chronic anticoagulation [Z79.01]  Not Applicable    Stage 3b chronic kidney disease [N18.32]  Yes    Chronic diastolic (congestive) heart failure [I50.32]  Yes    KILLIAN (acute kidney injury) [N17.9]  Yes    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        Brief Hospital Course to date:  Rock Maciel Jr. is a 79 y.o. male   presents the hospital after fall at home while using his stair chair lift with closed head injury and injury to the left shoulder causing left clavicle fracture.  He was found to have rhabdomyolysis and acute renal failure.     Discussion/plan for today:   Patient has history of CABG/CAD, plan to request cardiology to evaluate perioperatively to assist with cardiac management and to assess perioperative risk.  Daptomycin has been initiated for MRSA coverage as foot culture grew positive for MRSA culture from the right foot now growing MRSA.  Awaiting final MRSA susceptibilities.  N.p.o. at midnight for possible surgery tomorrow.  Appreciate podiatry.  Appreciate orthopedic surgery MRI of the knee does show meniscus tear and other injuries.  Patient has knee effusion.  Awaiting further orthopedic surgery recommendations regarding the knee.  Renal function worse today.   Appreciate nephrology recommendations, uncertain if patient may eventually require hemodialysis.  Eliquis on hold for potential surgery as is Plavix.  Leukocytosis has normalized.  Continue Johnson catheter and plan eventual voiding trial perhaps the day of discharge.  IV iron initiated for anemia.  No bleeding noted likely some blood loss due to bruising with rhabdomyolysis.  ATN and monitoring renal function.   Monitor blood counts for now received transfusion on 3/5 and may need eventual transfusion again.  Outpatient orthopedic follow-up for clavicle fracture.  Continue fall prevention and PT.  Glucose reviewed and insulin was further adjusted.  Treatment plan discussed with patient is in agreement.    Traumatic rhabdomyolysis due to fall and found down on the floor with left knee and shoulder contusion:  Treat with IV fluid initially.  Nephrology consulted to assist.  Trend CK.  Supportive care and symptom treatment.    Osteomyelitis of the right foot with abscess due to MRSA:  Infectious disease following.  Antibiotic coverage.  Podiatry following.     Acute renal failure due to ATN with CKD 3B with mild metabolic acidosis:  Nephrology consulted.  Clinically appears dry and likely prerenal, and ATN.  Hold nephrotoxins and renally dose medications.     Possible pneumonia:  Significantly elevated leukocytosis, right lower lobe infiltrate, occasional cough.  Suspect possible aspiration pneumonia while patient was down.  Allergy to Ceclor noted.  Patient initiated on vancomycin and Zosyn in emergency room.  Patient treated with Zosyn.  Respiratory status has improved    Left knee contusion with knee effusion and meniscus tear:   Orthopedic surgery following    Anemia:  Secondary to dilution.  Anemia lab workup.  Blood transfusion 3/5/2025.     Clavicle fracture:  Orthopedic evaluated and will follow-up in clinic.  On x-ray images reviewed and comminuted distal clavicle fracture with displacement of the clavicle  shaft     Fall and physical debility and Charcot foot deformity: PT OT evaluation.  Fall prevention.  PT OT recommend SNF     Type 2 diabetes: Monitor blood sugar and adjust insulin as needed.  Tradjenta     Atrial fibrillation: Eliquis anticoagulation.  Continue beta-blocker.  Continue home amiodarone.     Hyperlipidemia: Continue statin.  Stable.     Treatment plan discussed with patient who is in agreement.     DVT prophylaxis: Anticoagulant            Disposition: SNF    CODE STATUS:   Code Status and Medical Interventions: CPR (Attempt to Resuscitate); Full Support   Ordered at: 03/03/25 1739     Code Status (Patient has no pulse and is not breathing):    CPR (Attempt to Resuscitate)     Medical Interventions (Patient has pulse or is breathing):    Full Support     Level Of Support Discussed With:    Patient       Bishop Chakarborty MD  03/09/25

## 2025-03-09 NOTE — PLAN OF CARE
Goal Outcome Evaluation:  Plan of Care Reviewed With: patient        Progress: improving  Outcome Evaluation: Pt supine in bed at start of session and agreeable to work with PT. Pt is NWB to LUE d/t distal clavicle fx and plans for partial metatarsal excision and I&D on RLE tomorrow, NWB at this time. Pt performs bed mobility with maxA x 2 and once seated EOB, req's cueing for static sitting balance. Pt was able to toelrate sitting EOB ~ 7 min before requesting to return to supine position. Will follow up after surgery to asssess mobility.

## 2025-03-09 NOTE — PROGRESS NOTES
Renal Dose Adjustment    Zosyn has been appropriately renally dose adjusted to 4.5g IV q12h for kidney function and weight >120 kg based on P&T approved guideline and procedure.     Laurita Markham [facility]  Clinical Pharmacist

## 2025-03-09 NOTE — PROGRESS NOTES
"  Infectious Diseases Progress Note    Nicolasa Denny MD     [facility]  [place]: 6 days  Patient Identification:  Name: Rock Mcaiel Jr.  Age: 79 y.o.  Sex: male  :  1945  MRN: [id number]         Primary Care Physician: Denise Dickson, ZEINA        Subjective: Denies any complaints.  Getting ready for surgery tomorrow.  Tolerating daptomycin without any problem and Zosyn without any problem.  Interval History: See consultation note.    Objective:    Scheduled Meds:amiodarone, 200 mg, Oral, Daily  [Held by provider] apixaban, 2.5 mg, Oral, BID  vitamin C, 250 mg, Oral, Daily  atorvastatin, 20 mg, Oral, Nightly  bumetanide, 1 mg, Oral, BID Diuretics  cholecalciferol, 1,000 Units, Oral, Daily  [Held by provider] clopidogrel, 75 mg, Oral, Daily  DAPTOmycin, 8 mg/kg (Adjusted), Intravenous, Q48H  epoetin vince/vince-epbx, 10,000 Units, Subcutaneous, Weekly  hydrALAZINE, 10 mg, Oral, TID  insulin glargine, 4 Units, Subcutaneous, Nightly  insulin lispro, 2-9 Units, Subcutaneous, 4x Daily AC & at Bedtime  insulin lispro, 6 Units, Subcutaneous, TID With Meals  linagliptin, 5 mg, Oral, Daily  melatonin, 2.5 mg, Oral, Nightly  metoprolol succinate XL, 25 mg, Oral, Daily  pantoprazole, 40 mg, Oral, Q AM  piperacillin-tazobactam, 3.375 g, Intravenous, Q8H  sodium chloride, 3 mL, Intravenous, Q12H  tamsulosin, 0.4 mg, Oral, Daily      Continuous Infusions:Pharmacy Consult - Pharmacy to dose,         Vital signs in last 24 hours:  Temp:  [97.7 °F (36.5 °C)-98.6 °F (37 °C)] 97.7 °F (36.5 °C)  Heart Rate:  [60-66] 63  Resp:  [18-20] 18  BP: (136-146)/(54-59) 146/59    Intake/Output:    Intake/Output Summary (Last 24 hours) at 3/9/2025 0956  Last data filed at 3/8/2025 2055  Gross per 24 hour   Intake 240 ml   Output 425 ml   Net -185 ml       Exam:  /59 (BP Location: Right arm, Patient Position: Lying)   Pulse 63   Temp 97.7 °F (36.5 °C) (Oral)   Resp 18   Ht 185.4 cm (73\")   Wt 127 kg (279 lb 15.8 " oz)   SpO2 100%   BMI 36.94 kg/m²   Patient is examined using the personal protective equipment as per guidelines from infection control for this particular patient as enacted.  Hand washing was performed before and after patient interaction.  General Appearance:    Alert, cooperative, no distress, AAOx3                          Head:    Normocephalic, without obvious abnormality, atraumatic                           Eyes:    PERRL, pale conjunctiva                         Throat:   Lips, tongue, gums normal; oral mucosa pink and moist                           Neck:   Supple, symmetrical, trachea midline, no JVD                         Lungs:    Clear to auscultation bilaterally, respirations unlabored                 Chest Wall:    No tenderness or deformity left clavicular tenderness noted                          Heart:  S1-S2 irregular                  Abdomen:   Soft nontender                 Extremities: Right foot dressing chronic changes in the leg.                  Neurologic: Alert and oriented x 3       Data Review:    I reviewed the patient's new clinical results.  Results from last 7 days   Lab Units 03/09/25  0714 03/08/25  0403 03/07/25  0449 03/06/25  0508 03/05/25  0604 03/04/25  0446 03/03/25  1408   WBC 10*3/mm3 9.95 11.08* 13.94* 15.51* 14.98* 14.09* 24.99*   HEMOGLOBIN g/dL 7.6* 7.3* 7.3* 7.0* 6.2* 7.0* 9.5*   PLATELETS 10*3/mm3 261 238 215 193 182 185 253     Results from last 7 days   Lab Units 03/09/25  0714 03/08/25  0403 03/07/25  0449 03/06/25  0508 03/05/25  0604 03/04/25  0446 03/03/25  1408   SODIUM mmol/L 138 138 138 135* 138 140 136   POTASSIUM mmol/L 4.3 4.1 3.9 4.1 3.9 4.2 4.9   CHLORIDE mmol/L 101 103 102 101 101 106 100   CO2 mmol/L 23.0 22.5 23.0 24.3 24.9 21.0* 16.0*   BUN mg/dL 89* 76* 69* 67* 71* 69* 64*   CREATININE mg/dL 5.05* 4.22* 3.97* 3.85* 3.91* 4.06* 4.22*   CALCIUM mg/dL 8.1* 8.1* 8.0* 8.0* 7.8* 7.9* 8.8   GLUCOSE mg/dL 131* 119* 149* 147* 126* 128* 197*      Microbiology Results (last 10 days)       Procedure Component Value - Date/Time    Wound Culture - Wound, Foot, Right [[id number]]  (Abnormal) Collected: 03/06/25 1620    Lab Status: Preliminary result Specimen: Wound from Foot, Right Updated: 03/08/25 1007     Wound Culture Moderate growth (3+) Staphylococcus aureus, MRSA     Comment:   Methicillin resistant Staphylococcus aureus, Patient may be an isolation risk.         Light growth (2+) Normal Skin Ashley     Gram Stain Moderate (3+) WBCs per low power field      Few (2+) Gram positive cocci in pairs and clusters      Few (2+) Gram positive bacilli    Legionella Antigen, Urine - Urine, Urine, Clean Catch [[id number]]  (Normal) Collected: 03/06/25 1143    Lab Status: Final result Specimen: Urine, Clean Catch Updated: 03/06/25 1252     LEGIONELLA ANTIGEN, URINE Negative    S. Pneumo Ag Urine or CSF - Urine, Urine, Clean Catch [[id number]]  (Normal) Collected: 03/06/25 1143    Lab Status: Final result Specimen: Urine, Clean Catch Updated: 03/06/25 1252     Strep Pneumo Ag Negative    MRSA Screen, PCR (Inpatient) - Swab, Nares [[id number]]  (Normal) Collected: 03/03/25 2220    Lab Status: Final result Specimen: Swab from Nares Updated: 03/04/25 0013     MRSA PCR No MRSA Detected    Narrative:      The negative predictive value of this diagnostic test is high and should only be used to consider de-escalating anti-MRSA therapy. A positive result may indicate colonization with MRSA and must be correlated clinically.    Blood Culture - Blood, Arm, Left [[id number]]  (Normal) Collected: 03/03/25 1747    Lab Status: Final result Specimen: Blood from Arm, Left Updated: 03/08/25 1815     Blood Culture No growth at 5 days    Blood Culture - Blood, Hand, Right [[id number]]  (Normal) Collected: 03/03/25 1745    Lab Status: Final result Specimen: Blood from Hand, Right Updated: 03/08/25 1815     Blood Culture No growth at 5 days              Assessment:    Traumatic  rhabdomyolysis    Arteriosclerosis of coronary artery    Essential hypertension    Type 2 diabetes mellitus with circulatory disorder, without long-term current use of insulin    Hyperlipidemia    Charcot-Davida-Tooth disease-like deformity of foot    Fall    S/P CABG x 2    KILLIAN (acute kidney injury)    Chronic diastolic (congestive) heart failure    Stage 3b chronic kidney disease    Chronic anticoagulation    Persistent atrial fibrillation    Closed displaced fracture of left clavicle    Pneumonia due to infectious organism    ATN (acute tubular necrosis)    Contusion of left knee    Acute osteomyelitis of right foot    Diabetic foot infection    MRSA (methicillin resistant Staphylococcus aureus) infection  1-probable systemic illness due to  2-evolving ongoing infection with abscess of the right foot resulting in generalized weakness and  3-subsequent fall and fracture of left clavicle and left knee discomfort  4-diabetes mellitus  5-history of gout  6-sleep apnea  7-obesity  8-hypertension  9-severe anemia  10-acute on chronic renal failure  11-prior multiple diabetic foot infections and surgeries.     Recommendations/Discussions:  See my discussion and recommendations on 3/7/2025.  Based on the presence of MRSA in his right foot wound culture discussed with primary team about various options of coverage and given his renal insufficiency and ongoing use of Zosyn discussion took place with primary team about choice between linezolid and daptomycin.  Continue with Zosyn and start daptomycin, avoid concomitant use of statin while he is taking daptomycin, asked microbiology for sensitivity of the Staph aureus/MRSA for daptomycin and monitor mild toxicity with periodic CPK levels.  Management of other issues and plans for debridement of the right foot on 3/10/2025 per primary team and foot and ankle specialist.  Nicolasa Denny MD  3/9/2025  09:50 EDT    Parts of this note may be an electronic transcription/translation  of spoken language to printed text using the Dragon dictation system.

## 2025-03-09 NOTE — PROGRESS NOTES
Podiatry Progress Note      Patient: Rock Maciel Jr. Admit Date: 03/03/2025    Age: 79 y.o.   PCP: Denise Dickson APRN    MRN: [id number]  Room: [facility]        Subjective     Chief Complaint     Chief Complaint   Patient presents with    Fall        HPI     Patient resting in bed, no complaints of right foot pain.    Past Medical History     Past Medical History:   Diagnosis Date    Allergic rhinitis     Bronchitis     Coronary artery disease     Diabetes mellitus 2001    DM (diabetes mellitus)     Encounter for annual health examination 05/06/2014    Annual Health Assessment    Gout     Hiatal hernia     History of MRSA infection     RIGHT FOOT 2010    Hyperlipidemia     Hypertension     Murmur     Pneumothorax on right     Sleep apnea     pt wears CPAP at night    Wellness examination 06/24/2015    Annual Wellness Visit        Past Surgical History:   Procedure Laterality Date    ARTERY SURGERY Bilateral     carotid    CARDIAC CATHETERIZATION N/A 7/20/2018    Procedure: Left Heart Cath;  Surgeon: Jo Toney MD;  Location: [facility] INVASIVE LOCATION;  Service: Cardiovascular    CARDIAC CATHETERIZATION N/A 7/20/2018    Procedure: Coronary angiography;  Surgeon: Jo Toney MD;  Location: [facility] INVASIVE LOCATION;  Service: Cardiovascular    CAROTID ENDARTERECTOMY Bilateral     COLONOSCOPY  2008    CORONARY ARTERY BYPASS GRAFT WITH AORTIC VALVE REPAIR/REPLACEMENT N/A 7/23/2018    Procedure: INTRAOPERATIVE ALEX, MIDLINE STERNOTOMY, CORONARY ARTERY BYPASS GRAFTING X 3 USING ENDOSCOPICALLY HARVESTED LEFT GREATER SAPHENOUS VEIN,  AORTIC VALVE REPLACEMENT USING 25MM LOPEZ II ULTRA PORCINE VALVE, PRP;  Surgeon: Phong Posey MD;  Location: [facility] OR;  Service: Cardiothoracic    FOOT SURGERY Right 2010    5th digit removal    FOOT SURGERY Left 2011    1 digit removed    INCISION AND DRAINAGE LEG Right 3/28/2020    Procedure: DEBRIDMENT OF RIGHT CALF;  Surgeon: Ross Pineda  MD Victor Manuel;  Location: [facility] MAIN OR;  Service: Vascular;  Laterality: Right;    INGUINAL HERNIA REPAIR Left 11/29/2024    Procedure: INGUINAL HERNIA REPAIR LAPAROSCOPIC WITH DAVINCI ROBOT;  Surgeon: Phong Lazo MD;  Location: [facility] MAIN OR;  Service: Robotics - DaVinci;  Laterality: Left;    QUADRICEPS TENDON REPAIR Left 5/14/2019    Procedure: Left QUADRICEPS TENDON REPAIR;  Surgeon: Camilo Hunter MD;  Location: [facility] MAIN OR;  Service: Orthopedics    THORACENTESIS Right 11/21/2016    THORACOSCOPY Right 5/8/2017    Procedure: BRONCHOSCOPY, RIGHT VAT,  TOTAL DECORTICATION RIGHT LUNG, PLEURAL BX, PLACEMENT SUBPLEURAL PAIN CAATHETERS X2;  Surgeon: Donald Orlando III, MD;  Location: [facility] OR;  Service:     TONSILECTOMY, ADENOIDECTOMY, BILATERAL MYRINGOTOMY AND TUBES          Allergies   Allergen Reactions    Ceclor [Cefaclor] Rash     Rash in his 50s; he tolerated piperacillin-tazobactam in March 2020        Social History     Tobacco Use   Smoking Status Never    Passive exposure: Never   Smokeless Tobacco Never        Objective   Physical Exam    Vitals:    03/09/25 0749   BP: 146/59   Pulse:    Resp: 18   Temp: 97.7 °F (36.5 °C)   SpO2:           Dressing is clean dry intact, no worsening of erythema edema or purulence to the right foot.    Labs     Lab Results   Component Value Date    HGBA1C 6.00 (H) 03/03/2025    POCGLU 146 (H) 03/09/2025    SEDRATE 95 (H) 03/26/2020        CBC:      Lab 03/09/25  0714 03/08/25  0403 03/07/25  0449 03/06/25  0508 03/05/25  0604 03/04/25  0446 03/03/25  1408   WBC 9.95 11.08* 13.94* 15.51* 14.98*   < > 24.99*   HEMOGLOBIN 7.6* 7.3* 7.3* 7.0* 6.2*   < > 9.5*   HEMATOCRIT 24.7* 22.9* 22.3* 21.6* 18.9*   < > 29.4*   PLATELETS 261 238 215 193 182   < > 253   NEUTROS ABS  --   --   --   --   --   --  22.44*   MCV 93.6 90.5 90.3 91.1 88.3   < > 91.6    < > = values in this interval not displayed.          Results for orders placed or performed during the  hospital encounter of 03/03/25   Blood Culture - Blood, Arm, Left    Specimen: Arm, Left; Blood   Result Value Ref Range    Blood Culture No growth at 5 days         MRI Knee Left Without Contrast  Narrative: MRI KNEE LEFT  WO CONTRAST-     Date of Exam: 3/8/2025 3:09 PM     Indication: Increased pain and swelling status post fall. Equivocal exam  regarding possible quadriceps tendon tear.     Comparison: Radiographs 3/7/2025.     Technique: Multiplanar, multisequence MR imaging of the knee was  performed without contrast.     FINDINGS:     Soft tissues: Partially imaged moderate to large joint effusion with  diffuse synovial thickening and/or intra-articular debris. Trace fluid  extends into a thin popliteal cyst, measuring 7 cm craniocaudal. Patchy  soft tissue edema, greatest anteriorly. Multiple postoperative foci of  susceptibility artifact in the soft tissues at the anterior distal thigh  and knee. No solid soft tissue mass.     Menisci: Complex tear of the medial meniscus body and posterior horn  with a 7 mm meniscal body flap flipped into the medial femoral gutter.  Longitudinal horizontal oblique tear of the lateral meniscus posterior  body and posterior horn extending to the inferior articular surface with  a full-thickness or high-grade near full-thickness radial tear of the  lateral meniscus posterior root.     Cruciate Ligaments: The ACL is intact.  The PCL is intact.     Collateral ligaments: The MCL is intact.  The LCL complex is intact.     Extensor Mechanism: Evidence of prior distal quadriceps tendon repair  with partial ossification of the distal quadriceps tendon. Complex  recurrent moderate to high-grade partial-thickness tear/avulsion of the  distal quadriceps tendon with up to an estimated 2.5 cm proximal  retraction of the torn deep medial tendon fibers. Likely chronic partial  ossification of the otherwise intact proximal patellar tendon. Severe  diffuse muscular fatty atrophy.     Articular  cartilage: Diffuse grade II-III chondromalacia in the  patellofemoral compartment with multifocal full-thickness/near  full-thickness chondral fissuring at the lateral patellar facet and  lateral trochlea. Diffuse grade II-III chondromalacia in the medial  compartment with focal full-thickness or near full-thickness chondral  fissuring at the medial weightbearing portion of the medial femoral  condyle and medial tibial plateau and a small 6 mm suspected unstable  osteochondral fragment at the medial weightbearing portion of the medial  femoral condyle. Diffuse grade II chondromalacia in the lateral  compartment.     Bones: Suspected small 6 mm chronic unstable osteochondral fragment at  the medial weightbearing portion of the medial femoral condyle. Tiny  subchondral cystic changes at the patella and trochlea related overlying  chondromalacia. Postoperative changes in the patella. No acute fracture.  No concerning bone marrow lesions or marrow replacing process.     Impression:    1. Evidence of prior distal quadriceps tendon repair with a complex  recurrent moderate to high-grade partial-thickness tear/avulsion with  mild proximal retraction and partial ossification of the distal  quadriceps tendon. Recommend comparison to prior outside imaging if  available. Severe diffuse muscular fatty atrophy.  2. Tricompartmental chondromalacia/DJD, most severe in the medial and  patellofemoral compartments, with a small 6 mm suspected unstable  osteochondral fragment of the medial weightbearing portion of the medial  femoral condyle.  3. Complex tear of the medial meniscus body and posterior horn with a 7  mm meniscal body flap flipped into the medial femoral gutter.  4. Longitudinal horizontal oblique tear of the lateral meniscus  posterior body and posterior horn extending to the intra-articular  surface with a full-thickness or high-grade near full-thickness radial  tear of the lateral meniscus posterior horn.  5.  Partially imaged moderate to large joint effusion with diffuse  synovial thickening and/or intra-articular debris and a thin popliteal  cyst           This report was finalized on 3/8/2025 5:52 PM by Alverto Jones MD on  Workstation: [id number]          Assessment/Plan       79-year-old male with osteomyelitis of right foot    -patient examined and evaluated by myself  - Continue daily dressing changes  - Patient is on the OR schedule for tomorrow morning.  We will perform partial metatarsal excisions and incision and drainage.  - N.p.o. at midnight      Jimenez Mchugh DPM  Office: [phone]

## 2025-03-09 NOTE — PLAN OF CARE
Problem: Adult Inpatient Plan of Care  Goal: Plan of Care Review  Outcome: Progressing  Flowsheets (Taken 3/9/2025 1858)  Progress: improving  Outcome Evaluation: Pt AxOx4, medication given as scheduled, room air, VSS, continue plan of care  Goal: Patient-Specific Goal (Individualized)  Outcome: Progressing  Goal: Absence of Hospital-Acquired Illness or Injury  Outcome: Progressing  Intervention: Identify and Manage Fall Risk  Recent Flowsheet Documentation  Taken 3/9/2025 1446 by Neha Munguia RN  Safety Promotion/Fall Prevention:   safety round/check completed   activity supervised   assistive device/personal items within reach   clutter free environment maintained   fall prevention program maintained   nonskid shoes/slippers when out of bed  Intervention: Prevent Skin Injury  Recent Flowsheet Documentation  Taken 3/9/2025 1645 by Neha Munguia RN  Body Position: sitting up in bed  Taken 3/9/2025 1446 by Neha Munguia RN  Body Position: sitting up in bed  Intervention: Prevent Infection  Recent Flowsheet Documentation  Taken 3/9/2025 1446 by Neha Munguia RN  Infection Prevention: hand hygiene promoted  Goal: Optimal Comfort and Wellbeing  Outcome: Progressing  Intervention: Provide Person-Centered Care  Recent Flowsheet Documentation  Taken 3/9/2025 0836 by Neha Munguia RN  Trust Relationship/Rapport:   care explained   choices provided   emotional support provided   empathic listening provided   questions answered   questions encouraged   reassurance provided   thoughts/feelings acknowledged  Goal: Readiness for Transition of Care  Outcome: Progressing     Problem: Fall Injury Risk  Goal: Absence of Fall and Fall-Related Injury  Outcome: Progressing  Intervention: Identify and Manage Contributors  Recent Flowsheet Documentation  Taken 3/9/2025 1446 by Neha Munguia RN  Medication Review/Management: medications reviewed  Intervention: Promote Injury-Free Environment  Recent Flowsheet  Documentation  Taken 3/9/2025 1446 by Neha Munguia, RN  Safety Promotion/Fall Prevention:   safety round/check completed   activity supervised   assistive device/personal items within reach   clutter free environment maintained   fall prevention program maintained   nonskid shoes/slippers when out of bed     Problem: Skin Injury Risk Increased  Goal: Skin Health and Integrity  Outcome: Progressing  Intervention: Optimize Skin Protection  Recent Flowsheet Documentation  Taken 3/9/2025 1645 by Neha Munguia, RN  Activity Management: activity encouraged  Head of Bed (HOB) Positioning: HOB at 30-45 degrees  Taken 3/9/2025 1446 by Neha Munguia, RN  Activity Management: activity encouraged  Head of Bed (HOB) Positioning: HOB at 30-45 degrees   Goal Outcome Evaluation:           Progress: improving  Outcome Evaluation: Pt AxOx4, medication given as scheduled, room air, VSS, continue plan of care

## 2025-03-09 NOTE — CONSULTS
[facility]  Cardiology Consult Note    Patient Identification:  Name: Rock Maciel Jr.  Age: 79 y.o.  Sex: male  :  1945  MRN: [id number]             Requesting Physician: Dr. Chakraborty    Reason for Consultation / Chief Complaint: management recommendations preop clearance    History of Present Illness:   79 years old moderately obese male with known history of coronary artery disease, atrial fibrillation post cardioversion normal sinus rhythm needs the clearance for the foot surgery    Patient denies any chest pains or tightness in the chest    Comorbid cardiac risk factors:     Past Medical History:  Past Medical History:   Diagnosis Date    Allergic rhinitis     Bronchitis     Coronary artery disease     Diabetes mellitus     DM (diabetes mellitus)     Encounter for annual health examination 2014    Annual Health Assessment    Gout     Hiatal hernia     History of MRSA infection     RIGHT FOOT     Hyperlipidemia     Hypertension     Murmur     Pneumothorax on right     Sleep apnea     pt wears CPAP at night    Wellness examination 2015    Annual Wellness Visit     Past Surgical History:  Past Surgical History:   Procedure Laterality Date    ARTERY SURGERY Bilateral     carotid    CARDIAC CATHETERIZATION N/A 2018    Procedure: Left Heart Cath;  Surgeon: Jo Toney MD;  Location:  [facility] INV[facility];  Service: Cardiovascular    CARDIAC CATHETERIZATION N/A 2018    Procedure: Coronary angiography;  Surgeon: Jo Toney MD;  Location:  [facility] INV[facility] LOCATION;  Service: Cardiovascular    CAROTID ENDARTERECTOMY Bilateral     COLONOSCOPY      CORONARY ARTERY BYPASS GRAFT WITH AORTIC VALVE REPAIR/REPLACEMENT N/A 2018    Procedure: INTRAOPERATIVE ALEX, MIDLINE STERNOTOMY, CORONARY ARTERY BYPASS GRAFTING X 3 USING ENDOSCOPICALLY HARVESTED LEFT GREATER SAPHENOUS VEIN,  AORTIC VALVE REPLACEMENT USING 25MM LOPEZ II ULTRA  PORCINE VALVE, PRP;  Surgeon: Phong Posey MD;  Location: [facility] MAIN OR;  Service: Cardiothoracic    FOOT SURGERY Right 2010    5th digit removal    FOOT SURGERY Left 2011    1 digit removed    INCISION AND DRAINAGE LEG Right 3/28/2020    Procedure: DEBRIDMENT OF RIGHT CALF;  Surgeon: Ross Pineda MD;  Location: [facility] MAIN OR;  Service: Vascular;  Laterality: Right;    INGUINAL HERNIA REPAIR Left 11/29/2024    Procedure: INGUINAL HERNIA REPAIR LAPAROSCOPIC WITH DAVINCI ROBOT;  Surgeon: Phong Lazo MD;  Location: [facility] MAIN OR;  Service: Robotics - DaVinci;  Laterality: Left;    QUADRICEPS TENDON REPAIR Left 5/14/2019    Procedure: Left QUADRICEPS TENDON REPAIR;  Surgeon: Camilo Hunter MD;  Location: [facility] MAIN OR;  Service: Orthopedics    THORACENTESIS Right 11/21/2016    THORACOSCOPY Right 5/8/2017    Procedure: BRONCHOSCOPY, RIGHT VAT,  TOTAL DECORTICATION RIGHT LUNG, PLEURAL BX, PLACEMENT SUBPLEURAL PAIN CAATHETERS X2;  Surgeon: Donald Orlando III, MD;  Location: [facility] OR;  Service:     TONSILECTOMY, ADENOIDECTOMY, BILATERAL MYRINGOTOMY AND TUBES        Allergies:  Allergies   Allergen Reactions    Ceclor [Cefaclor] Rash     Rash in his 50s; he tolerated piperacillin-tazobactam in March 2020     Home Meds:  Medications Prior to Admission   Medication Sig Dispense Refill Last Dose/Taking    acetaminophen (TYLENOL) 325 MG tablet Take 2 tablets by mouth Every 6 (Six) Hours As Needed for Mild Pain.   3/2/2025    amiodarone (PACERONE) 200 MG tablet Take 1 tablet by mouth Daily. 30 tablet 5 3/2/2025    apixaban (ELIQUIS) 2.5 MG tablet tablet Take 1 tablet by mouth 2 (Two) Times a Day. Indications: Atrial Fibrillation 60 tablet 5 3/2/2025    atorvastatin (LIPITOR) 20 MG tablet Take 1 tablet by mouth Every Night. NEED TO SEE PROVIDER FOR FUTURE REFILLS. 30 tablet 0 3/2/2025    bumetanide (BUMEX) 1 MG tablet Take 1 tablet by mouth 2 (Two) Times a Day. 60 tablet 5 3/2/2025     Cholecalciferol 25 MCG (1000 UT) tablet Take 1 tablet by mouth Every 7 (Seven) Days.   3/2/2025    clopidogrel (PLAVIX) 75 MG tablet Take 1 tablet by mouth Daily. 90 tablet 1 3/2/2025    glipizide (GLUCOTROL) 5 MG tablet TAKE 1 TABLET BY MOUTH 2 TIMES A DAY BEFORE MEALS. 180 tablet 1 3/2/2025    glucose blood (Accu-Chek Keshia Plus) test strip USE TO TEST BLOOD SUGAR    TWICE DAILY FOR DIABETES 100 each 8 3/2/2025    hydrALAZINE (APRESOLINE) 25 MG tablet Take 1 tablet by mouth 3 (Three) Times a Day. 90 tablet 5 3/2/2025    linagliptin (Tradjenta) 5 MG tablet tablet Take 1 tablet by mouth Daily. 90 tablet 1 Past Week    metoprolol succinate XL (TOPROL-XL) 25 MG 24 hr tablet Take 1 tablet by mouth Daily. 30 tablet 5 3/2/2025    terazosin (HYTRIN) 2 MG capsule Take 1 capsule by mouth Every Night. 90 capsule 1 Past Week    vitamin C (ASCORBIC ACID) 250 MG tablet Take 1 tablet by mouth Daily.   3/2/2025    Zinc 50 MG tablet    3/2/2025    nitroglycerin (NITROSTAT) 0.4 MG SL tablet Place 1 tablet under the tongue Every 5 (Five) Minutes As Needed for Chest Pain (Only if SBP Greater Than 100). Take no more than 3 doses in 15 minutes. (Patient not taking: Reported on 12/20/2024) 25 tablet 0 Unknown     Current Meds:   [unfilled]  Social History:   Social History     Tobacco Use    Smoking status: Never     Passive exposure: Never    Smokeless tobacco: Never   Substance Use Topics    Alcohol use: Yes     Comment: either one glass wine or one glass liquor per  day      Family History:  Family History   Problem Relation Age of Onset    Hypertension Father     Malig Hyperthermia Neg Hx         Review of Systems    Constitutional: No weakness,fatigue, fever, rigors, chills   Eyes: No vision changes, eye pain   ENT/oropharynx: No difficulty swallowing, sore throat, epistaxis, changes in hearing   Cardiovascular: No chest pain, chest tightness, palpitations, paroxysmal nocturnal dyspnea, orthopnea, diaphoresis, dizziness / syncopal  episode   Respiratory: No shortness of breath, dyspnea on exertion, cough, wheezing hemoptysis   Gastrointestinal: No abdominal pain, nausea, vomiting, diarrhea, bloody stools   Genitourinary: No hematuria, dysuria   Neurological: No headache, tremors, numbness,  one-sided weakness    Musculoskeletal: Foot pain   Integument: No rash, edema           Constitutional:  Temp:  [97.7 °F (36.5 °C)-98.6 °F (37 °C)] 97.7 °F (36.5 °C)  Heart Rate:  [60-66] 63  Resp:  [18-20] 18  BP: (136-146)/(54-59) 146/59    Physical Exam   General:  Appears in no acute distress  Eyes: PERTL,  HEENT:  No JVD. Thyroid not visibly enlarged. No mucosal pallor or cyanosis  Respiratory: Respirations regular and unlabored at rest. BBS with good air entry in all fields. No crackles, rubs or wheezes auscultated  Cardiovascular: S1S2 Regular rate and rhythm. No murmur, rub or gallop auscultated. No carotid bruits. DP/PT pulses    . No pretibial pitting edema  Gastrointestinal: Abdomen soft, flat, non tender. Bowel sounds present. No hepatosplenomegaly. No ascites  Musculoskeletal: MARSHALL x4. No abnormal movements  Extremities: Deferred skin: Color pink. Skin warm and dry to touch. No rashes  No xanthoma  Neuro: AAO x3 CN II-XII grossly intact              Cardiographics  ECG:     Telemetry:    Echocardiogram:     Imaging  Chest X-ray:     Lab Review   Results from last 7 days   Lab Units 03/09/25 0714 03/08/25  0403 03/07/25  0449   CK TOTAL U/L 39 52 108         Results from last 7 days   Lab Units 03/09/25  0714   SODIUM mmol/L 138   POTASSIUM mmol/L 4.3   BUN mg/dL 89*   CREATININE mg/dL 5.05*   CALCIUM mg/dL 8.1*     @[id number]@  Results from last 7 days   Lab Units 03/09/25 0714 03/08/25  0403 03/07/25 0449   WBC 10*3/mm3 9.95 11.08* 13.94*   HEMOGLOBIN g/dL 7.6* 7.3* 7.3*   HEMATOCRIT % 24.7* 22.9* 22.3*   PLATELETS 10*3/mm3 261 238 215     Results from last 7 days   Lab Units 03/03/25  1408   INR  2.00*   APTT seconds 33.0          Assessment:  Preop clearance for foot surgery  Atrial fibrillation post cardioversion normal sinus rhythm  Chronic amiodarone therapy  Chronic anticoagulation  Recommendations / Plan:   79-year-old male with known history of the coronary artery disease, atrial fibrillation post cardioversion normal sinus rhythm clinically has no angina pectoris or congestive heart failure    Patient has been cleared for the foot surgery with nonmodifiable risk factors higher than normal risk          Jo Toney MD  3/9/2025, 12:20 EDT      EMR Dragon/Transcription:   Dictated utilizing Dragon dictation

## 2025-03-09 NOTE — PROGRESS NOTES
"      FOLLOW UP NOTE      Name: Rock Maciel Jr. ADMIT: 3/3/2025   : 1945  PCP: Denise Dickson APRN    MRN: [id number] LOS: 6 days   AGE/SEX: 79 y.o. male  ROOM: [facility]     Date of Service: 3/9/2025                           CHIEF COMPLIANTS / REASON FOR FOLLOW UP                Subjective:      Seen and examined, no acute distress.    sCr 5.05, Urine output 425cc  Plans for OR tomorrow for right foot OM     Review of Systems:     Negative except as above       OBJECTIVE                                                                        Exam:  /59 (BP Location: Right arm, Patient Position: Lying)   Pulse 63   Temp 97.7 °F (36.5 °C) (Oral)   Resp 18   Ht 185.4 cm (73\")   Wt 127 kg (279 lb 15.8 oz)   SpO2 100%   BMI 36.94 kg/m²   Intake/Output last 3 shifts:  I/O last 3 completed shifts:  In: 480 [P.O.:480]  Out: 925 [Urine:925]  Intake/Output this shift:  No intake/output data recorded.    General Appearance: Chronically ill, pale appearing      Lungs:   Lungs largely clear, diminished at lung bases  Heart: RRR  Abdomen:  Soft, nontender  Extremities: Extremities wrapped, trace ankle edema  Neurologic:   Alert and oriented  Johnson with brown urine    Scheduled Meds:amiodarone, 200 mg, Oral, Daily  [Held by provider] apixaban, 2.5 mg, Oral, BID  vitamin C, 250 mg, Oral, Daily  atorvastatin, 20 mg, Oral, Nightly  bumetanide, 1 mg, Oral, BID Diuretics  cholecalciferol, 1,000 Units, Oral, Daily  [Held by provider] clopidogrel, 75 mg, Oral, Daily  DAPTOmycin, 8 mg/kg (Adjusted), Intravenous, Q48H  epoetin vince/vince-epbx, 10,000 Units, Subcutaneous, Weekly  hydrALAZINE, 10 mg, Oral, TID  insulin glargine, 5 Units, Subcutaneous, Nightly  insulin lispro, 2-9 Units, Subcutaneous, 4x Daily AC & at Bedtime  insulin lispro, 6 Units, Subcutaneous, TID With Meals  linagliptin, 5 mg, Oral, Daily  melatonin, 2.5 mg, Oral, Nightly  metoprolol succinate XL, 25 mg, Oral, Daily  pantoprazole, 40 mg, Oral, " Q AM  piperacillin-tazobactam, 4.5 g, Intravenous, Q12H  sodium chloride, 3 mL, Intravenous, Q12H  tamsulosin, 0.4 mg, Oral, Daily      Continuous Infusions:Pharmacy Consult - Pharmacy to dose,         PRN Meds:  acetaminophen **OR** acetaminophen **OR** acetaminophen    artificial tears    senna-docusate sodium **AND** polyethylene glycol **AND** bisacodyl **AND** bisacodyl    cadexomer iodine    dextrose    dextrose    famotidine    glucagon (human recombinant)    HYDROcodone-acetaminophen    nitroglycerin    ondansetron ODT **OR** ondansetron    Pharmacy Consult - Pharmacy to dose    sodium chloride    sodium chloride         Data Review:                                                                           Labs reviewed        Imaging:                                                                           Radiology reviewed           ASSESSMENT:                                                                                Traumatic rhabdomyolysis    Arteriosclerosis of coronary artery    Essential hypertension    Type 2 diabetes mellitus with circulatory disorder, without long-term current use of insulin    Hyperlipidemia    Charcot-Davida-Tooth disease-like deformity of foot    Fall    S/P CABG x 2    KILLIAN (acute kidney injury)    Chronic diastolic (congestive) heart failure    Stage 3b chronic kidney disease    Chronic anticoagulation    Persistent atrial fibrillation    Closed displaced fracture of left clavicle    Pneumonia due to infectious organism    ATN (acute tubular necrosis)    Contusion of left knee    Acute osteomyelitis of right foot    Diabetic foot infection    MRSA (methicillin resistant Staphylococcus aureus) infection         KILLIAN: likely ATN related to rhabdomyolysis, prerenal insult related to diuretics etc. stabilizing with good urine output.  At this point, I remain concerned for CKD progression with significant baseline chronicity, recurrent KILLIAN, uncontrolled diabetes  etc.  Rhabdomyolysis related to fall: Resolved.  Lower extremity osteomyelitis/cellulitis  Edema feet  CKD stage III-IV: Baseline creatinine  1.8-2.6 mg/dL with baseline GFR around 25 mL/min  Acute urinary retention requiring Johnson catheter placement.  Clavicle fracture  A-fib with controlled rates.  History of HFpEF with pulmonary hypertension: Slightly volume expanded.  Severe anemia in CKD: TSAT 11%.  Cannot rule out occult GI bleed given anticoagulation.          PLAN:                                                                            Creatinine worse today at 5.05, BUN 89, UOP 425cc  Will hold bumex and get CXR.   Do worry about progression of CKD to stage V with no urgent need for RRT yet.  Continue to evaluate daily for indications for RRT. No urgent need today.  Podiatry following for foot osteomyelitis.  Antibiotics dosed for  CrCl<15 mL/min.  Follow-up with ID.  Johnson management per urology.  Continue weekly EPO.       [facility]INA [facility]  3/9/2025  11:30 EDT    The note was transcribed by  ZEINA.  I personally have examined the patient and interviewed the patient and modified the history, exam. I have reviewed the history, data, problems, assessment and plan .and I concur . Exam as described in the Progress note, with comments additions, revisions as noted by me.         MD Edward [facility]    3/9/2025  15:48 EDT

## 2025-03-10 ENCOUNTER — ANESTHESIA (OUTPATIENT)
Dept: PERIOP | Facility: HOSPITAL | Age: 80
End: 2025-03-10
Payer: MEDICARE

## 2025-03-10 ENCOUNTER — APPOINTMENT (OUTPATIENT)
Dept: GENERAL RADIOLOGY | Facility: HOSPITAL | Age: 80
End: 2025-03-10
Payer: MEDICARE

## 2025-03-10 ENCOUNTER — ANESTHESIA EVENT (OUTPATIENT)
Dept: PERIOP | Facility: HOSPITAL | Age: 80
End: 2025-03-10
Payer: MEDICARE

## 2025-03-10 PROBLEM — N17.9 ACUTE KIDNEY INJURY: Status: ACTIVE | Noted: 2025-03-03

## 2025-03-10 LAB
ANION GAP SERPL CALCULATED.3IONS-SCNC: 14.2 MMOL/L (ref 5–15)
BUN SERPL-MCNC: 91 MG/DL (ref 8–23)
BUN/CREAT SERPL: 15.6 (ref 7–25)
CALCIUM SPEC-SCNC: 7.8 MG/DL (ref 8.6–10.5)
CHLORIDE SERPL-SCNC: 100 MMOL/L (ref 98–107)
CK SERPL-CCNC: 20 U/L (ref 20–200)
CO2 SERPL-SCNC: 19.8 MMOL/L (ref 22–29)
CREAT SERPL-MCNC: 5.85 MG/DL (ref 0.76–1.27)
EGFRCR SERPLBLD CKD-EPI 2021: 9.1 ML/MIN/1.73
GLUCOSE BLDC GLUCOMTR-MCNC: 144 MG/DL (ref 70–130)
GLUCOSE BLDC GLUCOMTR-MCNC: 147 MG/DL (ref 70–130)
GLUCOSE BLDC GLUCOMTR-MCNC: 156 MG/DL (ref 70–130)
GLUCOSE BLDC GLUCOMTR-MCNC: 178 MG/DL (ref 70–130)
GLUCOSE BLDC GLUCOMTR-MCNC: 215 MG/DL (ref 70–130)
GLUCOSE SERPL-MCNC: 131 MG/DL (ref 65–99)
POTASSIUM SERPL-SCNC: 4.5 MMOL/L (ref 3.5–5.2)
SODIUM SERPL-SCNC: 134 MMOL/L (ref 136–145)
WHOLE BLOOD HOLD SPECIMEN: NORMAL

## 2025-03-10 PROCEDURE — 25010000002 PIPERACILLIN SOD-TAZOBACTAM PER 1 G: Performed by: PODIATRIST

## 2025-03-10 PROCEDURE — 87186 SC STD MICRODIL/AGAR DIL: CPT | Performed by: PODIATRIST

## 2025-03-10 PROCEDURE — 87075 CULTR BACTERIA EXCEPT BLOOD: CPT | Performed by: PODIATRIST

## 2025-03-10 PROCEDURE — 82948 REAGENT STRIP/BLOOD GLUCOSE: CPT

## 2025-03-10 PROCEDURE — 99232 SBSQ HOSP IP/OBS MODERATE 35: CPT | Performed by: NURSE PRACTITIONER

## 2025-03-10 PROCEDURE — 25810000003 LACTATED RINGERS PER 1000 ML: Performed by: ANESTHESIOLOGY

## 2025-03-10 PROCEDURE — 88307 TISSUE EXAM BY PATHOLOGIST: CPT | Performed by: PODIATRIST

## 2025-03-10 PROCEDURE — 63710000001 INSULIN LISPRO (HUMAN) PER 5 UNITS: Performed by: PODIATRIST

## 2025-03-10 PROCEDURE — 87205 SMEAR GRAM STAIN: CPT | Performed by: PODIATRIST

## 2025-03-10 PROCEDURE — 87147 CULTURE TYPE IMMUNOLOGIC: CPT | Performed by: PODIATRIST

## 2025-03-10 PROCEDURE — 82550 ASSAY OF CK (CPK): CPT | Performed by: STUDENT IN AN ORGANIZED HEALTH CARE EDUCATION/TRAINING PROGRAM

## 2025-03-10 PROCEDURE — 25010000002 LIDOCAINE 2% SOLUTION: Performed by: NURSE ANESTHETIST, CERTIFIED REGISTERED

## 2025-03-10 PROCEDURE — 0J9Q0ZX DRAINAGE OF RIGHT FOOT SUBCUTANEOUS TISSUE AND FASCIA, OPEN APPROACH, DIAGNOSTIC: ICD-10-PCS | Performed by: PODIATRIST

## 2025-03-10 PROCEDURE — 0QBN0ZZ EXCISION OF RIGHT METATARSAL, OPEN APPROACH: ICD-10-PCS | Performed by: PODIATRIST

## 2025-03-10 PROCEDURE — 80048 BASIC METABOLIC PNL TOTAL CA: CPT | Performed by: INTERNAL MEDICINE

## 2025-03-10 PROCEDURE — 63710000001 INSULIN GLARGINE PER 5 UNITS: Performed by: PODIATRIST

## 2025-03-10 PROCEDURE — 25010000002 PROPOFOL 10 MG/ML EMULSION: Performed by: NURSE ANESTHETIST, CERTIFIED REGISTERED

## 2025-03-10 PROCEDURE — 25010000002 PIPERACILLIN SOD-TAZOBACTAM PER 1 G: Performed by: INTERNAL MEDICINE

## 2025-03-10 PROCEDURE — 25010000002 ONDANSETRON PER 1 MG: Performed by: NURSE ANESTHETIST, CERTIFIED REGISTERED

## 2025-03-10 PROCEDURE — 87070 CULTURE OTHR SPECIMN AEROBIC: CPT | Performed by: PODIATRIST

## 2025-03-10 PROCEDURE — 73630 X-RAY EXAM OF FOOT: CPT

## 2025-03-10 PROCEDURE — 25010000002 LIDOCAINE 1 % SOLUTION: Performed by: PODIATRIST

## 2025-03-10 PROCEDURE — 25010000002 DAPTOMYCIN PER 1 MG: Performed by: PODIATRIST

## 2025-03-10 DEVICE — HEMOST ABS SURGIFOAM GELATIN/SPNG 8X12.5CM 2MM STRL: Type: IMPLANTABLE DEVICE | Site: FOOT | Status: FUNCTIONAL

## 2025-03-10 RX ORDER — HYDRALAZINE HYDROCHLORIDE 20 MG/ML
5 INJECTION INTRAMUSCULAR; INTRAVENOUS
Status: DISCONTINUED | OUTPATIENT
Start: 2025-03-10 | End: 2025-03-10 | Stop reason: HOSPADM

## 2025-03-10 RX ORDER — EPHEDRINE SULFATE 50 MG/ML
5 INJECTION, SOLUTION INTRAVENOUS ONCE AS NEEDED
Status: DISCONTINUED | OUTPATIENT
Start: 2025-03-10 | End: 2025-03-10 | Stop reason: HOSPADM

## 2025-03-10 RX ORDER — PROMETHAZINE HYDROCHLORIDE 25 MG/1
25 TABLET ORAL ONCE AS NEEDED
Status: DISCONTINUED | OUTPATIENT
Start: 2025-03-10 | End: 2025-03-10 | Stop reason: HOSPADM

## 2025-03-10 RX ORDER — SODIUM CHLORIDE 0.9 % (FLUSH) 0.9 %
3-10 SYRINGE (ML) INJECTION AS NEEDED
Status: DISCONTINUED | OUTPATIENT
Start: 2025-03-10 | End: 2025-03-10 | Stop reason: HOSPADM

## 2025-03-10 RX ORDER — FENTANYL CITRATE 50 UG/ML
25 INJECTION, SOLUTION INTRAMUSCULAR; INTRAVENOUS
Status: DISCONTINUED | OUTPATIENT
Start: 2025-03-10 | End: 2025-03-10 | Stop reason: HOSPADM

## 2025-03-10 RX ORDER — DIPHENHYDRAMINE HYDROCHLORIDE 50 MG/ML
12.5 INJECTION, SOLUTION INTRAMUSCULAR; INTRAVENOUS
Status: DISCONTINUED | OUTPATIENT
Start: 2025-03-10 | End: 2025-03-10 | Stop reason: HOSPADM

## 2025-03-10 RX ORDER — ATROPINE SULFATE 0.4 MG/ML
0.4 INJECTION, SOLUTION INTRAMUSCULAR; INTRAVENOUS; SUBCUTANEOUS ONCE AS NEEDED
Status: DISCONTINUED | OUTPATIENT
Start: 2025-03-10 | End: 2025-03-10 | Stop reason: HOSPADM

## 2025-03-10 RX ORDER — LIDOCAINE HYDROCHLORIDE 10 MG/ML
0.5 INJECTION, SOLUTION INFILTRATION; PERINEURAL ONCE AS NEEDED
Status: DISCONTINUED | OUTPATIENT
Start: 2025-03-10 | End: 2025-03-10 | Stop reason: HOSPADM

## 2025-03-10 RX ORDER — LABETALOL HYDROCHLORIDE 5 MG/ML
5 INJECTION, SOLUTION INTRAVENOUS
Status: DISCONTINUED | OUTPATIENT
Start: 2025-03-10 | End: 2025-03-10 | Stop reason: HOSPADM

## 2025-03-10 RX ORDER — HYDROCODONE BITARTRATE AND ACETAMINOPHEN 7.5; 325 MG/1; MG/1
1 TABLET ORAL EVERY 4 HOURS PRN
Status: DISCONTINUED | OUTPATIENT
Start: 2025-03-10 | End: 2025-03-10 | Stop reason: HOSPADM

## 2025-03-10 RX ORDER — IPRATROPIUM BROMIDE AND ALBUTEROL SULFATE 2.5; .5 MG/3ML; MG/3ML
3 SOLUTION RESPIRATORY (INHALATION) ONCE AS NEEDED
Status: DISCONTINUED | OUTPATIENT
Start: 2025-03-10 | End: 2025-03-10 | Stop reason: HOSPADM

## 2025-03-10 RX ORDER — SODIUM CHLORIDE, SODIUM LACTATE, POTASSIUM CHLORIDE, CALCIUM CHLORIDE 600; 310; 30; 20 MG/100ML; MG/100ML; MG/100ML; MG/100ML
9 INJECTION, SOLUTION INTRAVENOUS CONTINUOUS
Status: DISCONTINUED | OUTPATIENT
Start: 2025-03-10 | End: 2025-03-11

## 2025-03-10 RX ORDER — PROMETHAZINE HYDROCHLORIDE 25 MG/1
25 SUPPOSITORY RECTAL ONCE AS NEEDED
Status: DISCONTINUED | OUTPATIENT
Start: 2025-03-10 | End: 2025-03-10 | Stop reason: HOSPADM

## 2025-03-10 RX ORDER — SODIUM CHLORIDE 0.9 % (FLUSH) 0.9 %
3 SYRINGE (ML) INJECTION EVERY 12 HOURS SCHEDULED
Status: DISCONTINUED | OUTPATIENT
Start: 2025-03-10 | End: 2025-03-10 | Stop reason: HOSPADM

## 2025-03-10 RX ORDER — PROPOFOL 10 MG/ML
VIAL (ML) INTRAVENOUS AS NEEDED
Status: DISCONTINUED | OUTPATIENT
Start: 2025-03-10 | End: 2025-03-10 | Stop reason: SURG

## 2025-03-10 RX ORDER — FLUMAZENIL 0.1 MG/ML
0.2 INJECTION INTRAVENOUS AS NEEDED
Status: DISCONTINUED | OUTPATIENT
Start: 2025-03-10 | End: 2025-03-10 | Stop reason: HOSPADM

## 2025-03-10 RX ORDER — ONDANSETRON 2 MG/ML
INJECTION INTRAMUSCULAR; INTRAVENOUS AS NEEDED
Status: DISCONTINUED | OUTPATIENT
Start: 2025-03-10 | End: 2025-03-10 | Stop reason: SURG

## 2025-03-10 RX ORDER — MIDAZOLAM HYDROCHLORIDE 1 MG/ML
0.5 INJECTION, SOLUTION INTRAMUSCULAR; INTRAVENOUS
Status: DISCONTINUED | OUTPATIENT
Start: 2025-03-10 | End: 2025-03-10 | Stop reason: HOSPADM

## 2025-03-10 RX ORDER — ONDANSETRON 2 MG/ML
4 INJECTION INTRAMUSCULAR; INTRAVENOUS ONCE AS NEEDED
Status: DISCONTINUED | OUTPATIENT
Start: 2025-03-10 | End: 2025-03-10 | Stop reason: HOSPADM

## 2025-03-10 RX ORDER — NALOXONE HCL 0.4 MG/ML
0.2 VIAL (ML) INJECTION AS NEEDED
Status: DISCONTINUED | OUTPATIENT
Start: 2025-03-10 | End: 2025-03-10 | Stop reason: HOSPADM

## 2025-03-10 RX ORDER — BUMETANIDE 1 MG/1
2 TABLET ORAL
Status: DISCONTINUED | OUTPATIENT
Start: 2025-03-10 | End: 2025-03-17

## 2025-03-10 RX ORDER — HYDROMORPHONE HYDROCHLORIDE 1 MG/ML
0.25 INJECTION, SOLUTION INTRAMUSCULAR; INTRAVENOUS; SUBCUTANEOUS
Status: DISCONTINUED | OUTPATIENT
Start: 2025-03-10 | End: 2025-03-10 | Stop reason: HOSPADM

## 2025-03-10 RX ORDER — LIDOCAINE HYDROCHLORIDE 10 MG/ML
INJECTION, SOLUTION INFILTRATION; PERINEURAL AS NEEDED
Status: DISCONTINUED | OUTPATIENT
Start: 2025-03-10 | End: 2025-03-10 | Stop reason: HOSPADM

## 2025-03-10 RX ORDER — FENTANYL CITRATE 50 UG/ML
50 INJECTION, SOLUTION INTRAMUSCULAR; INTRAVENOUS ONCE AS NEEDED
Status: DISCONTINUED | OUTPATIENT
Start: 2025-03-10 | End: 2025-03-10 | Stop reason: HOSPADM

## 2025-03-10 RX ORDER — HYDROCODONE BITARTRATE AND ACETAMINOPHEN 5; 325 MG/1; MG/1
1 TABLET ORAL ONCE AS NEEDED
Status: DISCONTINUED | OUTPATIENT
Start: 2025-03-10 | End: 2025-03-10 | Stop reason: HOSPADM

## 2025-03-10 RX ORDER — LIDOCAINE HYDROCHLORIDE 20 MG/ML
INJECTION, SOLUTION INFILTRATION; PERINEURAL AS NEEDED
Status: DISCONTINUED | OUTPATIENT
Start: 2025-03-10 | End: 2025-03-10 | Stop reason: SURG

## 2025-03-10 RX ADMIN — Medication 2.5 MG: at 21:51

## 2025-03-10 RX ADMIN — HYDRALAZINE HYDROCHLORIDE 10 MG: 10 TABLET ORAL at 12:30

## 2025-03-10 RX ADMIN — INSULIN LISPRO 6 UNITS: 100 INJECTION, SOLUTION INTRAVENOUS; SUBCUTANEOUS at 12:29

## 2025-03-10 RX ADMIN — SODIUM CHLORIDE, SODIUM LACTATE, POTASSIUM CHLORIDE, AND CALCIUM CHLORIDE 9 ML/HR: 600; 310; 30; 20 INJECTION, SOLUTION INTRAVENOUS at 08:20

## 2025-03-10 RX ADMIN — MENTHOL, ZINC OXIDE 1 APPLICATION: .44; 20.6 OINTMENT TOPICAL at 21:59

## 2025-03-10 RX ADMIN — PROPOFOL 100 MG: 10 INJECTION, EMULSION INTRAVENOUS at 08:52

## 2025-03-10 RX ADMIN — LIDOCAINE HYDROCHLORIDE 60 MG: 20 INJECTION, SOLUTION INFILTRATION; PERINEURAL at 08:52

## 2025-03-10 RX ADMIN — INSULIN GLARGINE 5 UNITS: 100 INJECTION, SOLUTION SUBCUTANEOUS at 21:51

## 2025-03-10 RX ADMIN — INSULIN LISPRO 4 UNITS: 100 INJECTION, SOLUTION INTRAVENOUS; SUBCUTANEOUS at 21:52

## 2025-03-10 RX ADMIN — HYDRALAZINE HYDROCHLORIDE 10 MG: 10 TABLET ORAL at 21:51

## 2025-03-10 RX ADMIN — PROPOFOL 20 MG: 10 INJECTION, EMULSION INTRAVENOUS at 08:53

## 2025-03-10 RX ADMIN — Medication 3 ML: at 08:20

## 2025-03-10 RX ADMIN — ONDANSETRON 4 MG: 2 INJECTION, SOLUTION INTRAMUSCULAR; INTRAVENOUS at 09:17

## 2025-03-10 RX ADMIN — INSULIN LISPRO 2 UNITS: 100 INJECTION, SOLUTION INTRAVENOUS; SUBCUTANEOUS at 18:17

## 2025-03-10 RX ADMIN — Medication 3 ML: at 21:57

## 2025-03-10 RX ADMIN — AMIODARONE HYDROCHLORIDE 200 MG: 200 TABLET ORAL at 12:30

## 2025-03-10 RX ADMIN — INSULIN LISPRO 6 UNITS: 100 INJECTION, SOLUTION INTRAVENOUS; SUBCUTANEOUS at 18:17

## 2025-03-10 RX ADMIN — BUMETANIDE 2 MG: 1 TABLET ORAL at 15:24

## 2025-03-10 RX ADMIN — DAPTOMYCIN 800 MG: 500 INJECTION, POWDER, LYOPHILIZED, FOR SOLUTION INTRAVENOUS at 15:24

## 2025-03-10 RX ADMIN — BUMETANIDE 2 MG: 1 TABLET ORAL at 18:17

## 2025-03-10 RX ADMIN — ATORVASTATIN CALCIUM 20 MG: 20 TABLET, FILM COATED ORAL at 21:51

## 2025-03-10 RX ADMIN — PIPERACILLIN AND TAZOBACTAM 4.5 G: 4; .5 INJECTION, POWDER, FOR SOLUTION INTRAVENOUS at 18:17

## 2025-03-10 RX ADMIN — HYDRALAZINE HYDROCHLORIDE 10 MG: 10 TABLET ORAL at 18:17

## 2025-03-10 RX ADMIN — METOPROLOL SUCCINATE 25 MG: 25 TABLET, EXTENDED RELEASE ORAL at 07:12

## 2025-03-10 RX ADMIN — PIPERACILLIN AND TAZOBACTAM 4.5 G: 4; .5 INJECTION, POWDER, FOR SOLUTION INTRAVENOUS at 05:40

## 2025-03-10 NOTE — PROGRESS NOTES
Orthopaedic Progress Note     Pain well controlled at left upper extremity. Denied pain at knees during initial evaluation. Now with left knee pain and swelling. History of quad tendon repair but he is unsure when or where this occurred. MRI of the left knee with recurrent tearing of the tendon laterally not full thickness as tendon still intact medially.       CONSTITUTIONAL: No acute distress  LUNGS: Equal chest rise, no shortness of air  CARDIOVASCULAR: palpable peripheral pulses  LYMPH: no lymphadenopathy in the area examined  Musculoskeletal:   LUE  Skin intact with no tenting of the skin   arm resting in a position of comfort  AIN PIN ULNAR NERVE intact   Sensation is intact distally   Foot is wwp   Left knee   Moderate effusion   Midline incision well healed.   Able to contract quadricep  No obvious palpable defect   Difficult to determine continuity of the quad tendon   Too weak to do straight leg raise   Sensation is not intact to light touch distally   Foot with TMA is warm and appears perfused     Cellulitis present at bilateral lower extremities. TMA on the right. Sensation not intact to light touch difficulty with dense palpation. Wound about the third digit on the right foot mild drainage.     79 year old male with left distal clavicle fracture and possible quad tendon tear as his physical exam is difficult secondary to weakness   MRI left knee with lateral aspect of quad tendon tear partial tearing without full thickness tear.   -knee immobilizer for the left knee. WBAT in knee immobilizer or hinged knee brace. Keep knee straight.  -discussed that this injury can be treated conservatively as he is extremely high risk for surgical intervention and its associated complications with infection and increased risk of morbidity. To remain in hinged knee brace or knee immobilizer at least 3 more weeks then begin ROM with PT.   -pain control   -ice and elevation   -PT   -dvt ppx  -podiatry managing right toe  wound   Regarding left shoulder / distal clavicle fracture   -Avoid forward flexion until further signs of consolidation/healing on radiographs   Sling as tolerated   PT   Ice   Pain control   -please call/chat  with any questions or concerns.    John Mcgee MD   [facility]   [phone] - [place] [facility]  [phone] - [facility]

## 2025-03-10 NOTE — PLAN OF CARE
Goal Outcome Evaluation:  Plan of Care Reviewed With: patient        Progress: improving  Outcome Evaluation: Pt A/Ox4, stable V/S, Maintained NPO after midnight, Pt on 2L O2 flow and uses CPAP during night while sleeping, Pt had 2 BM in this shift, suppository provided, wound dressing changed, chlorhexidine bath completed, plans for surgery this morning.

## 2025-03-10 NOTE — CONSULTS
Name: Rock Maciel Jr. ADMIT: 3/3/2025   : 1945  PCP: Denise Dickson APRN    MRN: [id number] LOS: 8 days   AGE/SEX: 79 y.o. male  ROOM: [facility]     Inpatient Vascular Surgery Consult  Consult performed by: Jeaneth Bonds MD  Consult ordered by: Tiesha Mccrary MD Ashlee Ann Vinyard, MD     LOS: 8 days   Patient Care Team:  Denise Dickson APRN as PCP - General (Nurse Practitioner)  Azam Banegas Jr., MD as Consulting Physician (Cardiology)  Jamil Stevens MD as Consulting Physician (Nephrology)    Subjective     Chief complaint: KILLIAN, CKD    History of Present Illness  79 y.o. male with CAD, type 2 diabetes mellitus, HTN, HLD, SHASTA, morbid obesity, and right diabetic foot infection who has been admitted to [facility] for a right diabetic foot infection and left clavicle fracture.  He has baseline CKD 3 but during his hospitalization he has developed KILLIAN and nephrology would like to initiate hemodialysis due to uremia.  He has never been on dialysis before.  He is right hand dominant.  He has limited ROM and swelling of the left arm due to the clavicular fracture but at baseline he has normal left arm function and no swelling.  He has never had a pacemaker, defibrillator, or chemoport.  He does take eliquis but this has been held.      Review of Systems   Constitutional:  Negative for chills and fever.   All other systems reviewed and are negative.      Past Medical History:   Diagnosis Date    Allergic rhinitis     Bronchitis     Coronary artery disease     Diabetes mellitus     DM (diabetes mellitus)     Encounter for annual health examination 2014    Annual Health Assessment    Gout     Hiatal hernia     History of MRSA infection     RIGHT FOOT     Hyperlipidemia     Hypertension     Murmur     Pneumothorax on right     Sleep apnea     pt wears CPAP at night    Wellness examination 2015    Annual Wellness Visit       Past Surgical History:   Procedure Laterality Date     ARTERY SURGERY Bilateral     carotid    CARDIAC CATHETERIZATION N/A 7/20/2018    Procedure: Left Heart Cath;  Surgeon: Jo Toney MD;  Location: [facility] CATH INVASIVE LOCATION;  Service: Cardiovascular    CARDIAC CATHETERIZATION N/A 7/20/2018    Procedure: Coronary angiography;  Surgeon: Jo Toney MD;  [facility];  Service: Cardiovascular    CAROTID ENDARTERECTOMY Bilateral     COLONOSCOPY  2008    CORONARY ARTERY BYPASS GRAFT WITH AORTIC VALVE REPAIR/REPLACEMENT N/A 7/23/2018    Procedure: INTRAOPERATIVE ALEX, MIDLINE STERNOTOMY, CORONARY ARTERY BYPASS GRAFTING X 3 USING ENDOSCOPICALLY HARVESTED LEFT GREATER SAPHENOUS VEIN,  AORTIC VALVE REPLACEMENT USING 25MM LOPEZ II ULTRA PORCINE VALVE, PRP;  Surgeon: Phong Posey MD;  Location: [facility] OR;  Service: Cardiothoracic    FOOT SURGERY Right 2010    5th digit removal    FOOT SURGERY Left 2011    1 digit removed    INCISION AND DRAINAGE LEG Right 3/28/2020    Procedure: DEBRIDMENT OF RIGHT CALF;  Surgeon: Ross Pineda MD;  Location: [facility] OR;  Service: Vascular;  Laterality: Right;    INGUINAL HERNIA REPAIR Left 11/29/2024    Procedure: INGUINAL HERNIA REPAIR LAPAROSCOPIC WITH DAVINCI ROBOT;  Surgeon: Phong Lazo MD;  Location: [facility] OR;  Service: Robotics - DaVinci;  Laterality: Left;    QUADRICEPS TENDON REPAIR Left 5/14/2019    Procedure: Left QUADRICEPS TENDON REPAIR;  Surgeon: Camilo Hunter MD;  Location: [facility] MAIN OR;  Service: Orthopedics    THORACENTESIS Right 11/21/2016    THORACOSCOPY Right 5/8/2017    Procedure: BRONCHOSCOPY, RIGHT VAT,  TOTAL DECORTICATION RIGHT LUNG, PLEURAL BX, PLACEMENT SUBPLEURAL PAIN CAATHETERS X2;  Surgeon: Donald Orlando III, MD;  Location: [facility] MAIN OR;  Service:     TONSILECTOMY, ADENOIDECTOMY, BILATERAL MYRINGOTOMY AND TUBES         Family History   Problem Relation Age of Onset    Hypertension Father     Jony  Hyperthermia Neg Hx          Social History     Tobacco Use    Smoking status: Never     Passive exposure: Never    Smokeless tobacco: Never   Vaping Use    Vaping status: Never Used   Substance Use Topics    Alcohol use: Yes     Alcohol/week: 7.0 standard drinks of alcohol     Types: 7 Drinks containing 0.5 oz of alcohol per week     Comment: either one glass wine or one glass liquor per  day    Drug use: Never       Allergies: Ceclor [cefaclor]    Medications Prior to Admission   Medication Sig Dispense Refill Last Dose/Taking    acetaminophen (TYLENOL) 325 MG tablet Take 2 tablets by mouth Every 6 (Six) Hours As Needed for Mild Pain.   3/2/2025    amiodarone (PACERONE) 200 MG tablet Take 1 tablet by mouth Daily. 30 tablet 5 3/2/2025    apixaban (ELIQUIS) 2.5 MG tablet tablet Take 1 tablet by mouth 2 (Two) Times a Day. Indications: Atrial Fibrillation 60 tablet 5 3/2/2025    atorvastatin (LIPITOR) 20 MG tablet Take 1 tablet by mouth Every Night. NEED TO SEE PROVIDER FOR FUTURE REFILLS. 30 tablet 0 3/2/2025    bumetanide (BUMEX) 1 MG tablet Take 1 tablet by mouth 2 (Two) Times a Day. 60 tablet 5 3/2/2025    Cholecalciferol 25 MCG (1000 UT) tablet Take 1 tablet by mouth Every 7 (Seven) Days.   3/2/2025    clopidogrel (PLAVIX) 75 MG tablet Take 1 tablet by mouth Daily. 90 tablet 1 3/2/2025    glipizide (GLUCOTROL) 5 MG tablet TAKE 1 TABLET BY MOUTH 2 TIMES A DAY BEFORE MEALS. 180 tablet 1 3/2/2025    glucose blood (Accu-Chek Keshia Plus) test strip USE TO TEST BLOOD SUGAR    TWICE DAILY FOR DIABETES 100 each 8 3/2/2025    hydrALAZINE (APRESOLINE) 25 MG tablet Take 1 tablet by mouth 3 (Three) Times a Day. 90 tablet 5 3/2/2025    linagliptin (Tradjenta) 5 MG tablet tablet Take 1 tablet by mouth Daily. 90 tablet 1 Past Week    metoprolol succinate XL (TOPROL-XL) 25 MG 24 hr tablet Take 1 tablet by mouth Daily. 30 tablet 5 3/10/2025 at  7:12 AM    terazosin (HYTRIN) 2 MG capsule Take 1 capsule by mouth Every Night. 90  capsule 1 Past Week    vitamin C (ASCORBIC ACID) 250 MG tablet Take 1 tablet by mouth Daily.   3/2/2025    Zinc 50 MG tablet    3/2/2025    nitroglycerin (NITROSTAT) 0.4 MG SL tablet Place 1 tablet under the tongue Every 5 (Five) Minutes As Needed for Chest Pain (Only if SBP Greater Than 100). Take no more than 3 doses in 15 minutes. (Patient not taking: Reported on 12/20/2024) 25 tablet 0 Unknown     amiodarone, 200 mg, Oral, Daily  [Held by provider] apixaban, 2.5 mg, Oral, BID  vitamin C, 250 mg, Oral, Daily  atorvastatin, 20 mg, Oral, Nightly  bumetanide, 2 mg, Oral, BID Diuretics  cholecalciferol, 1,000 Units, Oral, Daily  [Held by provider] clopidogrel, 75 mg, Oral, Daily  DAPTOmycin, 8 mg/kg (Adjusted), Intravenous, Q48H  epoetin vince/vince-epbx, 10,000 Units, Subcutaneous, Weekly  hydrALAZINE, 10 mg, Oral, TID  insulin glargine, 5 Units, Subcutaneous, Nightly  insulin lispro, 2-9 Units, Subcutaneous, 4x Daily AC & at Bedtime  insulin lispro, 6 Units, Subcutaneous, TID With Meals  linagliptin, 5 mg, Oral, Daily  melatonin, 2.5 mg, Oral, Nightly  Menthol-Zinc Oxide, 1 Application, Topical, Q12H  metoprolol succinate XL, 25 mg, Oral, Daily  pantoprazole, 40 mg, Oral, Q AM  piperacillin-tazobactam, 4.5 g, Intravenous, Q12H  sodium chloride, 3 mL, Intravenous, Q12H  tamsulosin, 0.4 mg, Oral, Daily      Pharmacy Consult - Pharmacy to dose,         acetaminophen **OR** acetaminophen **OR** acetaminophen    artificial tears    senna-docusate sodium **AND** polyethylene glycol **AND** bisacodyl **AND** bisacodyl    cadexomer iodine    dextrose    dextrose    famotidine    glucagon (human recombinant)    HYDROcodone-acetaminophen    nitroglycerin    ondansetron ODT **OR** ondansetron    Pharmacy Consult - Pharmacy to dose    sodium chloride    sodium chloride      Objective   Temp:  [97.2 °F (36.2 °C)-98.3 °F (36.8 °C)] 97.3 °F (36.3 °C)  Heart Rate:  [58-67] 61  Resp:  [15-18] 18  BP: (121-153)/(38-71) 153/48    No  intake/output data recorded.    Physical Exam  Vitals reviewed.   Constitutional:       General: He is not in acute distress.     Appearance: Normal appearance. He is morbidly obese.   HENT:      Head: Normocephalic and atraumatic.      Right Ear: External ear normal.      Left Ear: External ear normal.      Nose: Nose normal.      Mouth/Throat:      Mouth: Mucous membranes are moist.      Pharynx: Oropharynx is clear.   Eyes:      Extraocular Movements: Extraocular movements intact.      Conjunctiva/sclera: Conjunctivae normal.      Pupils: Pupils are equal, round, and reactive to light.   Cardiovascular:      Rate and Rhythm: Normal rate and regular rhythm.      Pulses:           Carotid pulses are 2+ on the right side and 2+ on the left side.       Radial pulses are 2+ on the right side and 2+ on the left side.        Dorsalis pedis pulses are 1+ on the left side.        Posterior tibial pulses are 1+ on the left side.      Comments: 2+ brachial pulses bilaterally  1+ LUE edema  No dilated superficial chest or upper extremity veins  Right foot with dressing in place, palpable left pedal pulses  Pulmonary:      Comments: Non labored respirations on room air, equal chest rise  Abdominal:      General: Abdomen is flat. There is no distension.      Palpations: Abdomen is soft.   Musculoskeletal:      Cervical back: Normal range of motion. No rigidity.      Right lower leg: No edema.      Left lower leg: No edema.   Skin:     General: Skin is warm and dry.      Capillary Refill: Capillary refill takes less than 2 seconds.   Neurological:      General: No focal deficit present.      Mental Status: He is alert and oriented to person, place, and time.   Psychiatric:         Mood and Affect: Mood normal.         Behavior: Behavior normal.         Results from last 7 days   Lab Units 03/11/25  0518 03/09/25  0714 03/08/25  0403 03/07/25  0449 03/06/25  0508 03/05/25  0604   WBC 10*3/mm3 9.23 9.95 11.08* 13.94* 15.51*  14.98*   HEMOGLOBIN g/dL 7.0* 7.6* 7.3* 7.3* 7.0* 6.2*   PLATELETS 10*3/mm3 263 261 238 215 193 182     Results from last 7 days   Lab Units 03/11/25  0518 03/10/25  0529 03/09/25  0714 03/08/25  0403 03/07/25  0449 03/06/25  0508 03/05/25  0604   SODIUM mmol/L 136 134* 138 138 138 135* 138   POTASSIUM mmol/L 4.9 4.5 4.3 4.1 3.9 4.1 3.9   CHLORIDE mmol/L 100 100 101 103 102 101 101   CO2 mmol/L 21.0* 19.8* 23.0 22.5 23.0 24.3 24.9   BUN mg/dL 107* 91* 89* 76* 69* 67* 71*   CREATININE mg/dL 6.67* 5.85* 5.05* 4.22* 3.97* 3.85* 3.91*   GLUCOSE mg/dL 146* 131* 131* 119* 149* 147* 126*   Estimated Creatinine Clearance: 12.3 mL/min (A) (by C-G formula based on SCr of 6.67 mg/dL (H)).      Imaging Studies:  CXR, 3/9/25        Data Points:  During this visit the following were done:  Labs Reviewed [x]    Labs Ordered []    Radiology Reports Reviewed [x]    Radiology Images Reviewed [x]    Radiology Ordered []    EKG, echo, and/or stress test reviewed []    Vascular lab results reviewed  []    Vascular lab images reviewed and interpreted per myself   []    Referring Provider Records Reviewed []    ER Records Reviewed []    Hospital Records Reviewed/Summarized [x]    History Obtained From Family []    Radiological images view and Interpreted per myself []    Case Discussed with referring provider []     Decision to obtain and request outside records  []    Total data points reviewed: 4      Active Hospital Problems    Diagnosis  POA    **Traumatic rhabdomyolysis [T79.6XXA]  Yes    MRSA (methicillin resistant Staphylococcus aureus) infection [A49.02]  Yes    Contusion of left knee [S80.02XA]  Yes    Acute osteomyelitis of right foot [M86.071]  Yes    Diabetic foot infection [E11.628, L08.9]  Yes    ATN (acute tubular necrosis) [N17.0]  Yes    Closed displaced fracture of left clavicle [S42.002A]  Yes    Pneumonia due to infectious organism [J18.9]  Yes    Acute kidney injury [N17.9]  Unknown    Persistent atrial fibrillation  [I48.19]  Yes    Chronic anticoagulation [Z79.01]  Not Applicable    Stage 3b chronic kidney disease [N18.32]  Yes    Chronic diastolic (congestive) heart failure [I50.32]  Yes    KILLIAN (acute kidney injury) [N17.9]  Yes    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         Assessment & Plan       Traumatic rhabdomyolysis    Arteriosclerosis of coronary artery    Essential hypertension    Type 2 diabetes mellitus with circulatory disorder, without long-term current use of insulin    Hyperlipidemia    Charcot-Davida-Tooth disease-like deformity of foot    Fall    S/P CABG x 2    KILLIAN (acute kidney injury)    Chronic diastolic (congestive) heart failure    Stage 3b chronic kidney disease    Chronic anticoagulation    Persistent atrial fibrillation    Closed displaced fracture of left clavicle    Pneumonia due to infectious organism    ATN (acute tubular necrosis)    Contusion of left knee    Acute osteomyelitis of right foot    Diabetic foot infection    MRSA (methicillin resistant Staphylococcus aureus) infection    Acute kidney injury        79 y.o. male with baseline CKD stage 3 who has been hospitalized for a right diabetic foot infection and has developed KILLIAN who now needs to initiate hemodialysis    -discussed dialysis access with patient at length.  We are planning for tunneled catheter placement today in the OR.  I discussed the risks, benefits, and alternatives of this procedure, including but not limited to bleeding, infection, and pneumothorax.  He expressed understanding and would like to proceed. I also discussed the possible need  for long term AV access, such as a fistula or graft.  Ideally, this would be placed in the left arm but this is complicated by his current clavicular fracture, which I understand will be treated non operatively.  Will plan for outpatient follow up and if AV access is needed, elective outpatient placement of AV access once his clavicular fracture has healed.      I discussed the patients findings and my recommendations with patient and nursing staff.  Please call my office with any question: [phone]    Jeaneth Bonds MD  03/11/25  08:58 EDT

## 2025-03-10 NOTE — ANESTHESIA PREPROCEDURE EVALUATION
Anesthesia Evaluation     Patient summary reviewed   no history of anesthetic complications:   NPO Solid Status: > 8 hours  NPO Liquid Status: > 2 hours           Airway   Mallampati: II  TM distance: >3 FB  Possible difficult intubation  Dental      Pulmonary     breath sounds clear to auscultation  (+) pneumonia (2LNC since admission) stable ,sleep apnea on CPAP  (-) shortness of breath, recent URI, not a smoker    ROS comment: 3/9 CXR - 1. Stable cardiomegaly with indistinct pulmonary vasculature and hazy  groundglass opacities and interstitial thickening throughout both lungs,  suggesting mild pulmonary edema or atypical pneumonia, with a suspected  small left pleural effusion.  2. Chronic elevation of the right hemidiaphragm with right perihilar and  right basilar subsegmental atelectasis and/or scarring.    Cardiovascular     ECG reviewed  PT is on anticoagulation therapy  Patient on routine beta blocker and Beta blocker given within 24 hours of surgery  Rate: normal    (+) hypertension, valvular problems/murmurs murmur, CAD, dysrhythmias Paroxysmal Atrial Fib, CHF , hyperlipidemia,  carotid artery disease  (-) past MI, angina      Neuro/Psych  (-) seizures, CVA  GI/Hepatic/Renal/Endo    (+) morbid obesity, hiatal hernia, renal disease (Cr 5.05)- CRI and ARF, diabetes mellitus type 2    Musculoskeletal     (-) neck stiffness  Abdominal    Substance History      OB/GYN          Other   blood dyscrasia (Hb 7.6) anemia,     ROS/Med Hx Other: Last eliquis 3/2                  Anesthesia Plan    ASA 4     general     (I have reviewed the patient's history and chart with the patient, including all pertinent laboratory results and imaging. I have explained the risks of anesthesia including but not limited to dental damage, corneal abrasion, nerve injury, MI, stroke, aspiration, and death. Patient has agreed to proceed.  )  intravenous induction     Anesthetic plan, risks, benefits, and alternatives have been  provided, discussed and informed consent has been obtained with: patient.      CODE STATUS:    Code Status (Patient has no pulse and is not breathing): CPR (Attempt to Resuscitate)  Medical Interventions (Patient has pulse or is breathing): Full Support  Level Of Support Discussed With: Patient

## 2025-03-10 NOTE — CONSULTS
Consult received for tunneled catheter placement.  Patient posted for tunneled catheter placement tomorrow, only OR availability is in the afternoon.  Will discuss with OR desk to see if this can be done earlier in the day if there is staff availability.  Full consult to follow.    Jeaneth Bonds MD  Vascular Surgery  O: [phone]  F: [phone]

## 2025-03-10 NOTE — ANESTHESIA POSTPROCEDURE EVALUATION
"Patient: Rock Maciel Jr.    Procedure Summary       Date: 03/10/25 Room / Location: [facility] OR  / [facility] OR    Anesthesia Start: 0844 Anesthesia Stop: 0958    Procedures:       INCISION AND DRAINAGE LOWER EXTREMITY (Right)      BONE EXCISION LOWER EXTERMITY, RIGHT (Right) Diagnosis:       Diabetic foot infection      (Diabetic foot infection [E11.628, L08.9])    Surgeons: Jimenez Mchugh DPM Provider: Tin Badillo DO    Anesthesia Type: general ASA Status: 4            Anesthesia Type: general    Vitals  Vitals Value Taken Time   /53 03/10/25 10:30   Temp 36.8 °C (98.3 °F) 03/10/25 09:55   Pulse 60 03/10/25 10:42   Resp 18 03/10/25 10:15   SpO2 96 % 03/10/25 10:42   Vitals shown include unfiled device data.        Post Anesthesia Care and Evaluation    Patient location during evaluation: bedside  Patient participation: complete - patient participated  Level of consciousness: awake and alert  Pain management: adequate    Airway patency: patent  Anesthetic complications: No anesthetic complications  PONV Status: controlled  Cardiovascular status: acceptable and hemodynamically stable  Respiratory status: acceptable, spontaneous ventilation and nonlabored ventilation  Hydration status: acceptable    Comments: /53 (BP Location: Right arm, Patient Position: Lying)   Pulse 66   Temp 36.8 °C (98.3 °F) (Oral)   Resp 18   Ht 185.4 cm (73\")   Wt 127 kg (279 lb 15.8 oz)   SpO2 100%   BMI 36.94 kg/m²       "

## 2025-03-10 NOTE — PROGRESS NOTES
[facility]  Cardiology Progress Note    Patient Identification:  Name: Rock Maciel Jr.  Age: 79 y.o.  Sex: male  :  1945  MRN: [id number]                 Follow Up / Chief Complaint:  follow up preop clearance    Interval History: Resting in bed. No cp or shob.  Incision and drainage, multiple compartments, right foot partial fourth metatarsal excision, right foot by Surgery today with Dr. Mchugh.          Objective:    Past Medical History:  Past Medical History:   Diagnosis Date    Allergic rhinitis     Bronchitis     Coronary artery disease     Diabetes mellitus     DM (diabetes mellitus)     Encounter for annual health examination 2014    Annual Health Assessment    Gout     Hiatal hernia     History of MRSA infection     RIGHT FOOT     Hyperlipidemia     Hypertension     Murmur     Pneumothorax on right     Sleep apnea     pt wears CPAP at night    Wellness examination 2015    Annual Wellness Visit     Past Surgical History:  Past Surgical History:   Procedure Laterality Date    ARTERY SURGERY Bilateral     carotid    CARDIAC CATHETERIZATION N/A 2018    Procedure: Left Heart Cath;  Surgeon: Jo Toney MD;  Location: [facility] CATH INVASIVE LOCATION;  Service: Cardiovascular    CARDIAC CATHETERIZATION N/A 2018    Procedure: Coronary angiography;  Surgeon: Jo Toney MD;  Location: [facility] CATH INVASIVE LOCATION;  Service: Cardiovascular    CAROTID ENDARTERECTOMY Bilateral     COLONOSCOPY      CORONARY ARTERY BYPASS GRAFT WITH AORTIC VALVE REPAIR/REPLACEMENT N/A 2018    Procedure: INTRAOPERATIVE ALEX, MIDLINE STERNOTOMY, CORONARY ARTERY BYPASS GRAFTING X 3 USING ENDOSCOPICALLY HARVESTED LEFT GREATER SAPHENOUS VEIN,  AORTIC VALVE REPLACEMENT USING 25MM LOPEZ II ULTRA PORCINE VALVE, PRP;  Surgeon: Phong Posey MD;  Location: [facility] OR;  Service: Cardiothoracic    FOOT SURGERY Right 2010    5th digit removal    FOOT  SURGERY Left 2011    1 digit removed    INCISION AND DRAINAGE LEG Right 3/28/2020    Procedure: DEBRIDMENT OF RIGHT CALF;  Surgeon: Ross Pineda MD;  Location: [facility] MAIN OR;  Service: Vascular;  Laterality: Right;    INGUINAL HERNIA REPAIR Left 11/29/2024    Procedure: INGUINAL HERNIA REPAIR LAPAROSCOPIC WITH DAVINCI ROBOT;  Surgeon: Phong Lazo MD;  Location: [facility] MAIN OR;  Service: Robotics - DaVinci;  Laterality: Left;    QUADRICEPS TENDON REPAIR Left 5/14/2019    Procedure: Left QUADRICEPS TENDON REPAIR;  Surgeon: Camilo Hunter MD;  Location: [facility] MAIN OR;  Service: Orthopedics    THORACENTESIS Right 11/21/2016    THORACOSCOPY Right 5/8/2017    Procedure: BRONCHOSCOPY, RIGHT VAT,  TOTAL DECORTICATION RIGHT LUNG, PLEURAL BX, PLACEMENT SUBPLEURAL PAIN CAATHETERS X2;  Surgeon: Donald Orlando III, MD;  Location: [facility] OR;  Service:     TONSILECTOMY, ADENOIDECTOMY, BILATERAL MYRINGOTOMY AND TUBES          Social History:   Social History     Tobacco Use    Smoking status: Never     Passive exposure: Never    Smokeless tobacco: Never   Substance Use Topics    Alcohol use: Yes     Alcohol/week: 7.0 standard drinks of alcohol     Types: 7 Drinks containing 0.5 oz of alcohol per week     Comment: either one glass wine or one glass liquor per  day      Family History:  Family History   Problem Relation Age of Onset    Hypertension Father     Malig Hyperthermia Neg Hx           Allergies:  Allergies   Allergen Reactions    Ceclor [Cefaclor] Rash     Rash in his 50s; he tolerated piperacillin-tazobactam in March 2020     Scheduled Meds:  amiodarone, 200 mg, Daily  [Held by provider] apixaban, 2.5 mg, BID  vitamin C, 250 mg, Daily  atorvastatin, 20 mg, Nightly  bumetanide, 2 mg, BID Diuretics  cholecalciferol, 1,000 Units, Daily  [Held by provider] clopidogrel, 75 mg, Daily  DAPTOmycin, 8 mg/kg (Adjusted), Q48H  epoetin vince/vince-epbx, 10,000 Units, Weekly  hydrALAZINE, 10 mg,  TID  insulin glargine, 5 Units, Nightly  insulin lispro, 2-9 Units, 4x Daily AC & at Bedtime  insulin lispro, 6 Units, TID With Meals  linagliptin, 5 mg, Daily  melatonin, 2.5 mg, Nightly  metoprolol succinate XL, 25 mg, Daily  pantoprazole, 40 mg, Q AM  piperacillin-tazobactam, 4.5 g, Q12H  sodium chloride, 3 mL, Q12H  tamsulosin, 0.4 mg, Daily            INTAKE AND OUTPUT:    Intake/Output Summary (Last 24 hours) at 3/10/2025 1156  Last data filed at 3/10/2025 0921  Gross per 24 hour   Intake 320 ml   Output 320 ml   Net 0 ml       Review of Systems:   GI: no n/v  Cardiac: no cp  Pulmonary: no shob    Constitutional:  Temp:  [97.2 °F (36.2 °C)-98.4 °F (36.9 °C)] 98.3 °F (36.8 °C)  Heart Rate:  [58-71] 66  Resp:  [15-20] 18  BP: (121-160)/(38-78) 147/53        Physical Exam:  General:  Alert, cooperative, appears in no acute distress  Respiratory: Clear to auscultation.  Normal respiratory effort and rate.  Cardiovascular: S1S2 Regular rate and rhythm. No murmur, rub or gallop.   Gastrointestinal: soft, non tender. Bowel sounds present.   Extremities: MARSHALL x4. No pretibial pitting edema. Adequate musculoskeletal strength.   Neuro: AAO x3 CN II-XII grossly intact        Cardiographics       ECG:     Echocardiogram:   10/2024  Interpretation Summary         Left ventricular ejection fraction appears to be 61 - 65%.    Left ventricular diastolic function was indeterminate.    The left atrial cavity is moderately dilated.    Left atrial volume is moderately increased.    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    Estimated right ventricular systolic pressure from tricuspid regurgitation is moderately elevated (45-55 mmHg).     Lab Review   Results from last 7 days   Lab Units 03/10/25  0529 03/09/25  0714 03/08/25  0403   CK TOTAL U/L 20 39 52         Results from last 7 days   Lab Units 03/10/25  0529   SODIUM mmol/L 134*   POTASSIUM mmol/L 4.5   BUN mg/dL 91*   CREATININE mg/dL 5.85*   CALCIUM mg/dL 7.8*  "    @[id number]@  Results from last 7 days   Lab Units 03/09/25  0714 03/08/25  0403 03/07/25  0449   WBC 10*3/mm3 9.95 11.08* 13.94*   HEMOGLOBIN g/dL 7.6* 7.3* 7.3*   HEMATOCRIT % 24.7* 22.9* 22.3*   PLATELETS 10*3/mm3 261 238 215     Results from last 7 days   Lab Units 03/03/25  1408   INR  2.00*   APTT seconds 33.0         Assessment:    Preop clearance for foot surgery  Atrial fibrillation post cardioversion normal sinus rhythm  Chronic amiodarone therapy  Chronic anticoagulation  History of CAD    Plan:  Blood pressure and HR stable s/p surgery.  No change from our perspective.    )3/10/2025  ZEINA Fox/Transcription:   \"Dictated utilizing Dragon dictation\".     "

## 2025-03-10 NOTE — PROGRESS NOTES
[facility] Medicine Services  PROGRESS NOTE    Patient Name: Rock Maciel Jr.  : 1945  MRN: [id number]    Date of Admission: 3/3/2025  Primary Care Physician: Denise Dickson APRN    Subjective   Subjective     CC:  Follow-up for recent fall    Subjective:  Patient is eager to have the surgery on his foot.  He was seen in the preoperative area.  No new complaints overnight.    Review of Systems    No current fevers or chills  No current nausea, vomiting, or diarrhea  No current chest pain or palpitations     Objective   Objective     Vital Signs:   Temp:  [97.2 °F (36.2 °C)-98.4 °F (36.9 °C)] 97.3 °F (36.3 °C)  Heart Rate:  [58-71] 62  Resp:  [15-20] 18  BP: (121-160)/(38-78) 138/52        Physical Exam:    Constitutional: Awake, alert, elderly and chronically ill-appearing  HENT: Moist membranes moist, neck supple  Respiratory: No cough or wheezes, normal respirations, nonlabored breathing   Cardiovascular: Pulse rate is normal, palpable radial pulses  Gastrointestinal:  Soft, nontender, nondistended  Musculoskeletal: Left knee swelling and bruising, significantly obese BMI is 36, mild lower extremity edema, physically debilitated in appearance, frail  Psychiatric: Appropriate affect, cooperative, conversational  Neurologic: No slurred speech or facial droop, follows commands  Skin: Right foot toe wound is present and unchanged from admission, currently with bandage in place, some bruising noted particularly in the bilateral lower extremity around the knee regions otherwise somewhat pale, no rashes or jaundice, warm          Results Reviewed:  Results from last 7 days   Lab Units 25  0714 25  0403 25  0449 25  0446 25  1408   WBC 10*3/mm3 9.95 11.08* 13.94*   < > 24.99*   HEMOGLOBIN g/dL 7.6* 7.3* 7.3*   < > 9.5*   HEMATOCRIT % 24.7* 22.9* 22.3*   < > 29.4*   PLATELETS 10*3/mm3 261 238 215   < > 253   INR   --   --   --   --  2.00*   PROCALCITONIN ng/mL  --   --    --   --  4.80*    < > = values in this interval not displayed.     Results from last 7 days   Lab Units 03/10/25  0529 03/09/25  0714 03/08/25  0403 03/07/25  0449 03/06/25  0508 03/05/25  0604 03/04/25  0446   SODIUM mmol/L 134* 138 138   < > 135* 138 140   POTASSIUM mmol/L 4.5 4.3 4.1   < > 4.1 3.9 4.2   CHLORIDE mmol/L 100 101 103   < > 101 101 106   CO2 mmol/L 19.8* 23.0 22.5   < > 24.3 24.9 21.0*   BUN mg/dL 91* 89* 76*   < > 67* 71* 69*   CREATININE mg/dL 5.85* 5.05* 4.22*   < > 3.85* 3.91* 4.06*   GLUCOSE mg/dL 131* 131* 119*   < > 147* 126* 128*   CALCIUM mg/dL 7.8* 8.1* 8.1*   < > 8.0* 7.8* 7.9*   ALK PHOS U/L  --   --   --   --  220* 163* 94   ALT (SGPT) U/L  --   --   --   --  61* 53* 30   AST (SGOT) U/L  --   --   --   --  63* 82* 79*    < > = values in this interval not displayed.     Estimated Creatinine Clearance: 14.1 mL/min (A) (by C-G formula based on SCr of 5.85 mg/dL (H)).    Microbiology Results Abnormal       Procedure Component Value - Date/Time    Wound Culture - Wound, Foot, Right [[id number]]  (Abnormal)  (Susceptibility) Collected: 03/06/25 1620    Lab Status: Final result Specimen: Wound from Foot, Right Updated: 03/10/25 1008     Wound Culture Moderate growth (3+) Staphylococcus aureus, MRSA     Comment:   Methicillin resistant Staphylococcus aureus, Patient may be an isolation risk.         Scant growth (1+) Candida albicans      Light growth (2+) Normal Skin Ashley     Gram Stain Moderate (3+) WBCs per low power field      Few (2+) Gram positive cocci in pairs and clusters      Few (2+) Gram positive bacilli    Susceptibility        Staphylococcus aureus, MRSA      ABBIE      Clindamycin Susceptible      Erythromycin Resistant      Oxacillin Resistant      Rifampin Susceptible      Tetracycline Susceptible      Trimethoprim + Sulfamethoxazole Resistant      Vancomycin Susceptible                                   Imaging Results (Last 24 Hours)       Procedure Component Value Units  Date/Time    XR Foot 3+ View Right [[id number]] Collected: 03/10/25 1126     Updated: 03/10/25 1132    Narrative:      XR FOOT 3+ VW RIGHT-     INDICATIONS: Postoperative evaluation     TECHNIQUE: 3 views of the right foot     COMPARISON: 3/6/2025     FINDINGS:     Since the prior exam, partial resection of the fourth metatarsal has  occurred at the level of the mid metatarsal. The bones otherwise appear  stable. Surgical soft tissue gas is present, and persistent soft tissue  swelling is noted. Arterial calcifications are again seen.       Impression:         Postsurgical changes.           This report was finalized on 3/10/2025 11:29 AM by Dr. Azam Alaniz M.D on Workstation: [employer]       XR Chest 1 View [[id number]] Collected: 03/09/25 1813     Updated: 03/09/25 1819    Narrative:      XR CHEST 1 VW-     DATE OF EXAM: 3/9/2025 5:26 PM     INDICATION: Evaluate for pulmonary edema.     COMPARISON: Radiographs 3/3/2025, 11/14/2024, 11/10/2024. CT abdomen and  pelvis 11/25/2024. CT chest 5/7/2024..     TECHNIQUE: Portable upright AP views of the chest were obtained.     FINDINGS:  Lordotic positioning. Overlying artifacts. Stable sternotomy wires,  postoperative changes from CABG, and prosthetic heart valve. Chronic  elevation of the right hemidiaphragm with right perihilar and right  basilar subsegmental atelectasis and/or scarring. Indistinct pulmonary  vasculature with hazy groundglass opacities and interstitial thickening  in both lungs, suggesting mild pulmonary edema or atypical pneumonia.  Blunting of the left costophrenic angle, which could represent a small  pleural effusion. Calcified remnants of prior granulomatous disease. No  pneumothorax. Stable cardiomegaly, likely accentuated by technique.  Incomplete evaluated chronic changes in both shoulders. No acute osseous  abnormality is identified.       Impression:         1. Stable cardiomegaly with indistinct pulmonary vasculature and  hazy  groundglass opacities and interstitial thickening throughout both lungs,  suggesting mild pulmonary edema or atypical pneumonia, with a suspected  small left pleural effusion.  2. Chronic elevation of the right hemidiaphragm with right perihilar and  right basilar subsegmental atelectasis and/or scarring.     This report was finalized on 3/9/2025 6:16 PM by Alverto Jones MD on  Workstation: [id number]               Results for orders placed during the hospital encounter of 10/03/24    Adult Transthoracic Echo Complete W/ Cont if Necessary Per Protocol    Interpretation Summary    Left ventricular ejection fraction appears to be 61 - 65%.    Left ventricular diastolic function was indeterminate.    The left atrial cavity is moderately dilated.    Left atrial volume is moderately increased.    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    Estimated right ventricular systolic pressure from tricuspid regurgitation is moderately elevated (45-55 mmHg).      I have reviewed the medications:  Scheduled Meds:amiodarone, 200 mg, Oral, Daily  [Held by provider] apixaban, 2.5 mg, Oral, BID  vitamin C, 250 mg, Oral, Daily  atorvastatin, 20 mg, Oral, Nightly  bumetanide, 2 mg, Oral, BID Diuretics  cholecalciferol, 1,000 Units, Oral, Daily  [Held by provider] clopidogrel, 75 mg, Oral, Daily  DAPTOmycin, 8 mg/kg (Adjusted), Intravenous, Q48H  epoetin vince/vince-epbx, 10,000 Units, Subcutaneous, Weekly  hydrALAZINE, 10 mg, Oral, TID  insulin glargine, 5 Units, Subcutaneous, Nightly  insulin lispro, 2-9 Units, Subcutaneous, 4x Daily AC & at Bedtime  insulin lispro, 6 Units, Subcutaneous, TID With Meals  linagliptin, 5 mg, Oral, Daily  melatonin, 2.5 mg, Oral, Nightly  Menthol-Zinc Oxide, 1 Application, Topical, Q12H  metoprolol succinate XL, 25 mg, Oral, Daily  pantoprazole, 40 mg, Oral, Q AM  piperacillin-tazobactam, 4.5 g, Intravenous, Q12H  sodium chloride, 3 mL, Intravenous, Q12H  tamsulosin, 0.4 mg, Oral,  Daily      Continuous Infusions:lactated ringers, 9 mL/hr, Last Rate: 9 mL/hr (03/10/25 0844)  Pharmacy Consult - Pharmacy to dose,         PRN Meds:.  acetaminophen **OR** acetaminophen **OR** acetaminophen    artificial tears    senna-docusate sodium **AND** polyethylene glycol **AND** bisacodyl **AND** bisacodyl    cadexomer iodine    dextrose    dextrose    famotidine    glucagon (human recombinant)    HYDROcodone-acetaminophen    nitroglycerin    ondansetron ODT **OR** ondansetron    Pharmacy Consult - Pharmacy to dose    sodium chloride    sodium chloride    Assessment & Plan   Assessment & Plan     Active Hospital Problems    Diagnosis  POA    **Traumatic rhabdomyolysis [T79.6XXA]  Yes    MRSA (methicillin resistant Staphylococcus aureus) infection [A49.02]  Yes    Contusion of left knee [S80.02XA]  Yes    Acute osteomyelitis of right foot [M86.071]  Yes    Diabetic foot infection [E11.628, L08.9]  Yes    ATN (acute tubular necrosis) [N17.0]  Yes    Closed displaced fracture of left clavicle [S42.002A]  Yes    Pneumonia due to infectious organism [J18.9]  Yes    Persistent atrial fibrillation [I48.19]  Yes    Chronic anticoagulation [Z79.01]  Not Applicable    Stage 3b chronic kidney disease [N18.32]  Yes    Chronic diastolic (congestive) heart failure [I50.32]  Yes    KILLIAN (acute kidney injury) [N17.9]  Yes    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        Brief Hospital Course to date:  Rock Maciel Jr. is a 79 y.o. male   presents the hospital after fall at home while using his stair chair lift with closed head injury and  injury to the left shoulder causing left clavicle fracture.  He was found to have rhabdomyolysis and acute renal failure.     Discussion/plan for today:   Patient seen prior to surgery today.  Surgical intervention now completed and surgical report reviewed.  Case discussed with nephrology and they are recommending vascular surgery consult for dialysis access.  Consult vascular surgery.  Possible access placement tomorrow as patient with persistently worsening renal function and appears in need of dialysis soon.  Volume status currently acceptable and patient not currently uremic but will monitor.  Eliquis on hold for potential surgery as is Plavix.  Restart when possible.  Continue Johnson catheter and plan eventual voiding trial perhaps the day of discharge.  IV iron treatment provided for anemia.  No bleeding noted likely some blood loss due to bruising with rhabdomyolysis.  ATN worsening.   Monitor blood counts for now received transfusion on 3/5 and may need eventual transfusion again.  Per orthopedics, outpatient orthopedic follow-up for clavicle fracture.  Continue fall prevention and PT.  Glucose reviewed and insulin was further adjusted.  Treatment plan discussed with patient is in agreement.    Traumatic rhabdomyolysis due to fall and found down on the floor with left knee and shoulder contusion:  Treat with IV fluid initially.  Nephrology consulted to assist.  Trend CK.  Supportive care and symptom treatment.    Osteomyelitis of the right foot with abscess due to MRSA:  Infectious disease following.  Antibiotic coverage.  Podiatry following.     Acute renal failure due to ATN with CKD 3B with mild metabolic acidosis:  Nephrology consulted.  Clinically appears dry and likely prerenal, and ATN.  Hold nephrotoxins and renally dose medications.  Vascular surgery consult on 3/10 for dialysis access     Possible pneumonia:  Significantly elevated leukocytosis, right lower lobe infiltrate, occasional  cough.  Suspect possible aspiration pneumonia while patient was down.  Allergy to Ceclor noted.  Patient initiated on vancomycin and Zosyn in emergency room.  Patient treated with Zosyn.  Respiratory status has improved    Left knee contusion with knee effusion and meniscus tear:   Orthopedic surgery following, MRI noted    Anemia:  Secondary to dilution.  Anemia lab workup.  Blood transfusion 3/5/2025.     Clavicle fracture:  Orthopedic evaluated and will follow-up in clinic.  On x-ray images reviewed and comminuted distal clavicle fracture with displacement of the clavicle shaft     Fall and physical debility and Charcot foot deformity: PT OT evaluation.  Fall prevention.  PT OT recommend SNF     Type 2 diabetes: Monitor blood sugar and adjust insulin as needed.  Tradjenta     Atrial fibrillation: Eliquis anticoagulation.  Continue beta-blocker.  Continue home amiodarone.     Hyperlipidemia: Continue statin.  Stable.     Treatment plan discussed with patient who is in agreement.     DVT prophylaxis: Anticoagulant            Disposition: SNF    CODE STATUS:   Code Status and Medical Interventions: CPR (Attempt to Resuscitate); Full Support   Ordered at: 03/03/25 1739     Code Status (Patient has no pulse and is not breathing):    CPR (Attempt to Resuscitate)     Medical Interventions (Patient has pulse or is breathing):    Full Support     Level Of Support Discussed With:    Patient       Bishop Chakraborty MD  03/10/25     Spine appears normal, (-) midline tendernss, (-) pelvic rock +right leg edema (h/o dvt)

## 2025-03-10 NOTE — PROGRESS NOTES
Infectious Diseases Progress Note    Nicolasa Denny MD     [facility]  [place]: 7 days  Patient Identification:  Name: Rock Maciel Jr.  Age: 79 y.o.  Sex: male  :  1945  MRN: [id number]         Primary Care Physician: Denise Dickson, ZEINA        Subjective: No complaints.  Had surgery earlier today.  Interval History: See consultation note.    Objective:    Scheduled Meds:amiodarone, 200 mg, Oral, Daily  [Held by provider] apixaban, 2.5 mg, Oral, BID  vitamin C, 250 mg, Oral, Daily  atorvastatin, 20 mg, Oral, Nightly  cholecalciferol, 1,000 Units, Oral, Daily  [Held by provider] clopidogrel, 75 mg, Oral, Daily  DAPTOmycin, 8 mg/kg (Adjusted), Intravenous, Q48H  [Transfer Hold] epoetin vince/vince-epbx, 10,000 Units, Subcutaneous, Weekly  hydrALAZINE, 10 mg, Oral, TID  [Transfer Hold] insulin glargine, 5 Units, Subcutaneous, Nightly  [Transfer Hold] insulin lispro, 2-9 Units, Subcutaneous, 4x Daily AC & at Bedtime  [Transfer Hold] insulin lispro, 6 Units, Subcutaneous, TID With Meals  linagliptin, 5 mg, Oral, Daily  [Transfer Hold] melatonin, 2.5 mg, Oral, Nightly  metoprolol succinate XL, 25 mg, Oral, Daily  [Transfer Hold] pantoprazole, 40 mg, Oral, Q AM  piperacillin-tazobactam, 4.5 g, Intravenous, Q12H  [Transfer Hold] sodium chloride, 3 mL, Intravenous, Q12H  sodium chloride, 3 mL, Intravenous, Q12H  [Transfer Hold] tamsulosin, 0.4 mg, Oral, Daily      Continuous Infusions:lactated ringers, 9 mL/hr, Last Rate: 9 mL/hr (03/10/25 0844)  Pharmacy Consult - Pharmacy to dose,         Vital signs in last 24 hours:  Temp:  [97.2 °F (36.2 °C)-98.4 °F (36.9 °C)] 98 °F (36.7 °C)  Heart Rate:  [59-71] 59  Resp:  [18-20] 18  BP: (135-160)/(39-78) 145/51    Intake/Output:    Intake/Output Summary (Last 24 hours) at 3/10/2025 0918  Last data filed at 3/10/2025 0558  Gross per 24 hour   Intake 20 ml   Output 320 ml   Net -300 ml       Exam:  /51 (BP Location: Right arm, Patient Position: Lying)    "Pulse 59   Temp 98 °F (36.7 °C) (Oral)   Resp 18   Ht 185.4 cm (73\")   Wt 127 kg (279 lb 15.8 oz)   SpO2 100%   BMI 36.94 kg/m²   Patient is examined using the personal protective equipment as per guidelines from infection control for this particular patient as enacted.  Hand washing was performed before and after patient interaction.  General Appearance:    Alert, cooperative, no distress, AAOx3                          Head:    Normocephalic, without obvious abnormality, atraumatic                           Eyes:    PERRL, pale conjunctiva                         Throat:   Lips, tongue, gums normal; oral mucosa pink and moist                           Neck:   Supple, symmetrical, trachea midline, no JVD                         Lungs:    Clear to auscultation bilaterally, respirations unlabored                 Chest Wall:    No tenderness or deformity left clavicular tenderness noted                          Heart:  S1-S2 irregular                  Abdomen:   Soft nontender                 Extremities: Right foot dressed after incision and drainage of multiple compartments and partial resection of the fourth metatarsal.                  Neurologic: Alert and oriented x 3       Data Review:    I reviewed the patient's new clinical results.  Results from last 7 days   Lab Units 03/09/25  0714 03/08/25  0403 03/07/25  0449 03/06/25  0508 03/05/25  0604 03/04/25  0446 03/03/25  1408   WBC 10*3/mm3 9.95 11.08* 13.94* 15.51* 14.98* 14.09* 24.99*   HEMOGLOBIN g/dL 7.6* 7.3* 7.3* 7.0* 6.2* 7.0* 9.5*   PLATELETS 10*3/mm3 261 238 215 193 182 185 253     Results from last 7 days   Lab Units 03/10/25  0529 03/09/25  0714 03/08/25  0403 03/07/25  0449 03/06/25  0508 03/05/25  0604 03/04/25  0446   SODIUM mmol/L 134* 138 138 138 135* 138 140   POTASSIUM mmol/L 4.5 4.3 4.1 3.9 4.1 3.9 4.2   CHLORIDE mmol/L 100 101 103 102 101 101 106   CO2 mmol/L 19.8* 23.0 22.5 23.0 24.3 24.9 21.0*   BUN mg/dL 91* 89* 76* 69* 67* 71* 69* "   CREATININE mg/dL 5.85* 5.05* 4.22* 3.97* 3.85* 3.91* 4.06*   CALCIUM mg/dL 7.8* 8.1* 8.1* 8.0* 8.0* 7.8* 7.9*   GLUCOSE mg/dL 131* 131* 119* 149* 147* 126* 128*     Microbiology Results (last 10 days)       Procedure Component Value - Date/Time    Wound Culture - Wound, Foot, Right [[id number]]  (Abnormal) Collected: 03/06/25 1620    Lab Status: Preliminary result Specimen: Wound from Foot, Right Updated: 03/08/25 1007     Wound Culture Moderate growth (3+) Staphylococcus aureus, MRSA     Comment:   Methicillin resistant Staphylococcus aureus, Patient may be an isolation risk.         Light growth (2+) Normal Skin Ashley     Gram Stain Moderate (3+) WBCs per low power field      Few (2+) Gram positive cocci in pairs and clusters      Few (2+) Gram positive bacilli    Legionella Antigen, Urine - Urine, Urine, Clean Catch [[id number]]  (Normal) Collected: 03/06/25 1143    Lab Status: Final result Specimen: Urine, Clean Catch Updated: 03/06/25 1252     LEGIONELLA ANTIGEN, URINE Negative    S. Pneumo Ag Urine or CSF - Urine, Urine, Clean Catch [[id number]]  (Normal) Collected: 03/06/25 1143    Lab Status: Final result Specimen: Urine, Clean Catch Updated: 03/06/25 1252     Strep Pneumo Ag Negative    MRSA Screen, PCR (Inpatient) - Swab, Nares [[id number]]  (Normal) Collected: 03/03/25 2220    Lab Status: Final result Specimen: Swab from Nares Updated: 03/04/25 0013     MRSA PCR No MRSA Detected    Narrative:      The negative predictive value of this diagnostic test is high and should only be used to consider de-escalating anti-MRSA therapy. A positive result may indicate colonization with MRSA and must be correlated clinically.    Blood Culture - Blood, Arm, Left [[id number]]  (Normal) Collected: 03/03/25 1747    Lab Status: Final result Specimen: Blood from Arm, Left Updated: 03/08/25 1815     Blood Culture No growth at 5 days    Blood Culture - Blood, Hand, Right [[id number]]  (Normal) Collected: 03/03/25 1745    Lab  Status: Final result Specimen: Blood from Hand, Right Updated: 03/08/25 1815     Blood Culture No growth at 5 days              Assessment:    Traumatic rhabdomyolysis    Arteriosclerosis of coronary artery    Essential hypertension    Type 2 diabetes mellitus with circulatory disorder, without long-term current use of insulin    Hyperlipidemia    Charcot-Davida-Tooth disease-like deformity of foot    Fall    S/P CABG x 2    KILLIAN (acute kidney injury)    Chronic diastolic (congestive) heart failure    Stage 3b chronic kidney disease    Chronic anticoagulation    Persistent atrial fibrillation    Closed displaced fracture of left clavicle    Pneumonia due to infectious organism    ATN (acute tubular necrosis)    Contusion of left knee    Acute osteomyelitis of right foot    Diabetic foot infection    MRSA (methicillin resistant Staphylococcus aureus) infection  1-probable systemic illness due to  2-evolving ongoing infection with abscess of the right foot resulting in generalized weakness and  3-subsequent fall and fracture of left clavicle and left knee discomfort  4-diabetes mellitus  5-history of gout  6-sleep apnea  7-obesity  8-hypertension  9-severe anemia  10-acute on chronic renal failure  11-prior multiple diabetic foot infections and surgeries.     Recommendations/Discussions:  See my discussion and recommendations on 3/7/2025.  Continue with Zosyn and start daptomycin, avoid concomitant use of statin while he is taking daptomycin, asked microbiology for sensitivity of the Staph aureus/MRSA for daptomycin and monitor mild toxicity with periodic CPK levels.  Follow-up on operative culture results and depending upon operative findings and final surgery and subsequent concern for residual osteomyelitis final antibiotic regimen in terms of composition and duration would be recommended.  Nicolasa Denny MD  3/10/2025  09:18 EDT    Parts of this note may be an electronic transcription/translation of spoken language to  printed text using the Dragon dictation system.

## 2025-03-10 NOTE — ANESTHESIA PROCEDURE NOTES
Airway  Reason: elective    Date/Time: 3/10/2025 8:54 AM  Airway not difficult    General Information and Staff    Patient location during procedure: OR  Anesthesiologist: Tin Badillo DO  CRNA/CAA: Ling Jimenez CRNA    Indications and Patient Condition  Indications for airway management: airway protection    Preoxygenated: yes    Mask difficulty assessment: 0 - not attempted    Final Airway Details    Final airway type: supraglottic airway      Successful airway: unique  Size: 5   Number of attempts at approach: 1  Assessment: lips, teeth, and gum same as pre-op

## 2025-03-10 NOTE — PROGRESS NOTES
"      FOLLOW UP NOTE      Name: Rock Maciel Jr. ADMIT: 3/3/2025   : 1945  PCP: Denise Dickson APRN    MRN: [id number] LOS: 7 days   AGE/SEX: 79 y.o. male  ROOM: [facility]     Date of Service: 3/10/2025                           CHIEF COMPLIANTS / REASON FOR FOLLOW UP                Subjective:      S patient is s/p podiatric surgery today for toe osteomyelitis.  He is resting in bed.  Has a poor appetite.  On 4 L nasal cannula.  Urine output only 320 mL yesterday.  Urine output gradually declining.     Review of Systems:     Negative except as above       OBJECTIVE                                                                        Exam:  /53 (BP Location: Right arm, Patient Position: Lying)   Pulse 66   Temp 98.3 °F (36.8 °C) (Oral)   Resp 18   Ht 185.4 cm (73\")   Wt 127 kg (279 lb 15.8 oz)   SpO2 100%   BMI 36.94 kg/m²   Intake/Output last 3 shifts:  I/O last 3 completed shifts:  In: 260 [P.O.:240; Other:20]  Out: 745 [Urine:745]  Intake/Output this shift:  I/O this shift:  In: 300 [I.V.:300]  Out: -     General Appearance: Chronically ill, pale appearing      Lungs:   Lungs largely clear, diminished at lung bases  Heart: RRR  Abdomen:  Soft, nontender  Extremities: Extremities wrapped, 1+ edema, knee effusion noted  Neurologic:   Alert and oriented  Johnson with brown urine    Scheduled Meds:amiodarone, 200 mg, Oral, Daily  [Held by provider] apixaban, 2.5 mg, Oral, BID  vitamin C, 250 mg, Oral, Daily  atorvastatin, 20 mg, Oral, Nightly  bumetanide, 2 mg, Oral, BID Diuretics  cholecalciferol, 1,000 Units, Oral, Daily  [Held by provider] clopidogrel, 75 mg, Oral, Daily  DAPTOmycin, 8 mg/kg (Adjusted), Intravenous, Q48H  epoetin vince/vince-epbx, 10,000 Units, Subcutaneous, Weekly  hydrALAZINE, 10 mg, Oral, TID  insulin glargine, 5 Units, Subcutaneous, Nightly  insulin lispro, 2-9 Units, Subcutaneous, 4x Daily AC & at Bedtime  insulin lispro, 6 Units, Subcutaneous, TID With " Meals  linagliptin, 5 mg, Oral, Daily  melatonin, 2.5 mg, Oral, Nightly  metoprolol succinate XL, 25 mg, Oral, Daily  pantoprazole, 40 mg, Oral, Q AM  piperacillin-tazobactam, 4.5 g, Intravenous, Q12H  sodium chloride, 3 mL, Intravenous, Q12H  tamsulosin, 0.4 mg, Oral, Daily      Continuous Infusions:lactated ringers, 9 mL/hr, Last Rate: 9 mL/hr (03/10/25 0885)  Pharmacy Consult - Pharmacy to dose,         PRN Meds:  acetaminophen **OR** acetaminophen **OR** acetaminophen    artificial tears    senna-docusate sodium **AND** polyethylene glycol **AND** bisacodyl **AND** bisacodyl    cadexomer iodine    dextrose    dextrose    famotidine    glucagon (human recombinant)    HYDROcodone-acetaminophen    nitroglycerin    ondansetron ODT **OR** ondansetron    Pharmacy Consult - Pharmacy to dose    sodium chloride    sodium chloride         Data Review:                                                                           Labs reviewed        Imaging:                                                                           Radiology reviewed           ASSESSMENT:                                                                                Traumatic rhabdomyolysis    Arteriosclerosis of coronary artery    Essential hypertension    Type 2 diabetes mellitus with circulatory disorder, without long-term current use of insulin    Hyperlipidemia    Charcot-Davida-Tooth disease-like deformity of foot    Fall    S/P CABG x 2    KILLIAN (acute kidney injury)    Chronic diastolic (congestive) heart failure    Stage 3b chronic kidney disease    Chronic anticoagulation    Persistent atrial fibrillation    Closed displaced fracture of left clavicle    Pneumonia due to infectious organism    ATN (acute tubular necrosis)    Contusion of left knee    Acute osteomyelitis of right foot    Diabetic foot infection    MRSA (methicillin resistant Staphylococcus aureus) infection         KILLIAN: likely ATN related to rhabdomyolysis, prerenal insult  related to diuretics etc. stabilizing with good urine output.  At this point, I remain concerned for CKD progression with significant baseline chronicity, recurrent KILLIAN, uncontrolled diabetes etc.  Rhabdomyolysis related to fall: Resolved.  Lower extremity osteomyelitis/cellulitis  Edema feet  CKD stage III-IV: Baseline creatinine  1.8-2.6 mg/dL with baseline GFR around 25 mL/min  Acute urinary retention requiring Johnson catheter placement.  Clavicle fracture  A-fib with controlled rates.  History of HFpEF with pulmonary hypertension: Slightly volume expanded.  Severe anemia in CKD: TSAT 11%.  Cannot rule out occult GI bleed given anticoagulation.          PLAN:                                                                            GFR worsening with borderline uremic symptoms and some oliguria.  Discussed need for RRT initiation with the patient.  Discussed benefits versus risks specifically related to bleeding/infection with catheter placement.  Discussed risks of allergy/anaphylaxis, nausea, vomiting, dialysis disequilibrium, headache, blurred vision, hypotension, death as a complication of dialysis.  Patient understands and willing to proceed.  Will request vascular consult for TDC placement.  Plan for first HD treatment once TDC placed.  Reason for GFR decline likely multifactorial, some ATN, ongoing osteomyelitis etc.  At this point, diagnosis is still KILLIAN needing HD.   Podiatry following for foot osteomyelitis.  Antibiotics dosed for  CrCl<15 mL/min.  Follow-up with ID.  Johnson management per urology.  Continue weekly EPO.  Switch to 3 times a week once initiated on HD  Resume diuretics.     Plan D/W at length with Dr. Chakraborty and RN.  Answered all of patient's questions.    Tiesha Mccrary MD  [facility]  3/10/2025  11:24 EDT

## 2025-03-10 NOTE — NURSING NOTE
Reason for Visit: CWCN: We see patient per request of floor nurse regarding skin issue in Gluteal area. Patient alert, upon assessment we could see partial-thickness skin loss towards lower buttocks, blanchable sites, on soft tissue, no bony prominence, possibly related to friction, patient with BIM OF 36.9  Treatment Plan/Recommendations: Calmoseptine ointment was ordered.  Con specialty mattress, Centrella Pro+ could benefit.  Wound care order and pressure injury standing measures have been implemented into Epic.  Wound Team Follow up Plan: WCN will follow up according his needs.

## 2025-03-10 NOTE — OP NOTE
Operative Report    Patient: Rock Maciel Jr.     Patient YOB: 1945     Date of Surgery: 03/10/25     MRN: [id number]       SURGEON: Jimenez Mchugh III, DPM     ASSISTANT: None    PROCEDURE: Incision and drainage, multiple compartments, right foot  Partial fourth metatarsal excision, right foot    PRE-OPERATIVE DIAGNOSIS: Osteomyelitis, fourth metatarsal, right foot  Abscess, right foot    POST-OPERATIVE DIAGNOSIS: Same    ANAESTHESIA: MAC    HEMOSTASIS: None    ESTIMATED BLOOD LOSS: 20 cc    SPECIMEN: Purulence right foot swab sent for microbiology  Fourth metatarsal head neck sent for pathology    IMPLANTS: None    COMPLICATIONS: None    INDICATION FOR PROCEDURE: This is a 79-year-old male who has worsening infection to his right lateral forefoot and midfoot.  MRI showed osteomyelitis with soft tissue infection and gas.  He is requiring surgical intervention for the problem at this time.    Consent was obtained pre-operatively. All questions were answered, risks versus benefits were discussed at length. No guarantees or assurances were given or implied.    PROCEDURE: Patient was brought to the operating room and placed on the operative table in supine position. A surgical timeout was performed and then patients name, date of birth, procedure and surgical site were all verified. A local block of 10 cc 1% lidocaine plain was injected to the surgical site.  No tourniquet was used for this procedure.  The right lower extremity was then scrubbed, prepped and draped in the usual sterile manner.     Attention was directed to the right plantar lateral foot there is noted to be significant area of soft tissue loss that measured 6.0 cm x 3.0 cm.  #15 blade was used to sharply incise this area with all this nonviable tissue and surgical site.  There is purulence present swab was taken and sent for microbiology.  All remaining nonviable tissue was sharply excised with a #15 blade.  There is direct  visualization of the fourth metatarsal head that had dusky discoloration consistent with osteomyelitis.  Sagittal saw was used to remove the distal 2 cm of the fourth metatarsal which was sent for pathology evaluation.  All remaining nonviable tissue was removed.  There is noted to be some purulence proximal and under 15 blade was sharply incised along the shaft of the fourth metatarsal 2 cm proximal.  Remaining nonviable tissue was removed with rongeur #15 blade.  There is no further purulence expressed at this time.  Surgical site was flushed with copious amounts of sterile saline.  Surgical site was then packed with Betadine 4 x 4's, Kerlix, ABD pads, Coban.  Patient tolerated the procedure and anesthesia well.  Patient was transferred from the operating room to the recovery room with vital signs stable and neurovascular status intact.    *Patient will require return to the operating room.    Jimenez Mchugh III, LUCRECIA

## 2025-03-11 ENCOUNTER — ANESTHESIA (OUTPATIENT)
Dept: PERIOP | Facility: HOSPITAL | Age: 80
End: 2025-03-11
Payer: MEDICARE

## 2025-03-11 ENCOUNTER — APPOINTMENT (OUTPATIENT)
Dept: GENERAL RADIOLOGY | Facility: HOSPITAL | Age: 80
End: 2025-03-11
Payer: MEDICARE

## 2025-03-11 ENCOUNTER — ANESTHESIA EVENT (OUTPATIENT)
Dept: PERIOP | Facility: HOSPITAL | Age: 80
End: 2025-03-11
Payer: MEDICARE

## 2025-03-11 LAB
ANION GAP SERPL CALCULATED.3IONS-SCNC: 15 MMOL/L (ref 5–15)
BACTERIA SPEC AEROBE CULT: ABNORMAL
BUN SERPL-MCNC: 107 MG/DL (ref 8–23)
BUN/CREAT SERPL: 16 (ref 7–25)
CALCIUM SPEC-SCNC: 8 MG/DL (ref 8.6–10.5)
CHLORIDE SERPL-SCNC: 100 MMOL/L (ref 98–107)
CK SERPL-CCNC: 17 U/L (ref 20–200)
CO2 SERPL-SCNC: 21 MMOL/L (ref 22–29)
CREAT SERPL-MCNC: 6.67 MG/DL (ref 0.76–1.27)
CYTO UR: NORMAL
DEPRECATED RDW RBC AUTO: 49.7 FL (ref 37–54)
EGFRCR SERPLBLD CKD-EPI 2021: 7.8 ML/MIN/1.73
ERYTHROCYTE [DISTWIDTH] IN BLOOD BY AUTOMATED COUNT: 14.8 % (ref 12.3–15.4)
GLUCOSE BLDC GLUCOMTR-MCNC: 155 MG/DL (ref 70–130)
GLUCOSE BLDC GLUCOMTR-MCNC: 158 MG/DL (ref 70–130)
GLUCOSE BLDC GLUCOMTR-MCNC: 161 MG/DL (ref 70–130)
GLUCOSE BLDC GLUCOMTR-MCNC: 168 MG/DL (ref 70–130)
GLUCOSE SERPL-MCNC: 146 MG/DL (ref 65–99)
GRAM STN SPEC: ABNORMAL
HBV SURFACE AG SERPL QL IA: NORMAL
HCT VFR BLD AUTO: 22.5 % (ref 37.5–51)
HGB BLD-MCNC: 7 G/DL (ref 13–17.7)
LAB AP CASE REPORT: NORMAL
MCH RBC QN AUTO: 28.8 PG (ref 26.6–33)
MCHC RBC AUTO-ENTMCNC: 31.1 G/DL (ref 31.5–35.7)
MCV RBC AUTO: 92.6 FL (ref 79–97)
PATH REPORT.FINAL DX SPEC: NORMAL
PATH REPORT.GROSS SPEC: NORMAL
PLATELET # BLD AUTO: 263 10*3/MM3 (ref 140–450)
PMV BLD AUTO: 9.8 FL (ref 6–12)
POTASSIUM SERPL-SCNC: 4.9 MMOL/L (ref 3.5–5.2)
RBC # BLD AUTO: 2.43 10*6/MM3 (ref 4.14–5.8)
SODIUM SERPL-SCNC: 136 MMOL/L (ref 136–145)
WBC NRBC COR # BLD AUTO: 9.23 10*3/MM3 (ref 3.4–10.8)

## 2025-03-11 PROCEDURE — 25010000002 BUPIVACAINE (PF) 0.25 % SOLUTION 30 ML VIAL: Performed by: STUDENT IN AN ORGANIZED HEALTH CARE EDUCATION/TRAINING PROGRAM

## 2025-03-11 PROCEDURE — 77001 FLUOROGUIDE FOR VEIN DEVICE: CPT | Performed by: STUDENT IN AN ORGANIZED HEALTH CARE EDUCATION/TRAINING PROGRAM

## 2025-03-11 PROCEDURE — 36415 COLL VENOUS BLD VENIPUNCTURE: CPT | Performed by: PODIATRIST

## 2025-03-11 PROCEDURE — 0JH63XZ INSERTION OF TUNNELED VASCULAR ACCESS DEVICE INTO CHEST SUBCUTANEOUS TISSUE AND FASCIA, PERCUTANEOUS APPROACH: ICD-10-PCS | Performed by: STUDENT IN AN ORGANIZED HEALTH CARE EDUCATION/TRAINING PROGRAM

## 2025-03-11 PROCEDURE — C1894 INTRO/SHEATH, NON-LASER: HCPCS | Performed by: STUDENT IN AN ORGANIZED HEALTH CARE EDUCATION/TRAINING PROGRAM

## 2025-03-11 PROCEDURE — 25010000002 LIDOCAINE 2% SOLUTION: Performed by: NURSE ANESTHETIST, CERTIFIED REGISTERED

## 2025-03-11 PROCEDURE — 99222 1ST HOSP IP/OBS MODERATE 55: CPT | Performed by: STUDENT IN AN ORGANIZED HEALTH CARE EDUCATION/TRAINING PROGRAM

## 2025-03-11 PROCEDURE — 5A1D70Z PERFORMANCE OF URINARY FILTRATION, INTERMITTENT, LESS THAN 6 HOURS PER DAY: ICD-10-PCS | Performed by: STUDENT IN AN ORGANIZED HEALTH CARE EDUCATION/TRAINING PROGRAM

## 2025-03-11 PROCEDURE — 85027 COMPLETE CBC AUTOMATED: CPT | Performed by: PODIATRIST

## 2025-03-11 PROCEDURE — 99232 SBSQ HOSP IP/OBS MODERATE 35: CPT | Performed by: NURSE PRACTITIONER

## 2025-03-11 PROCEDURE — 25010000002 PROPOFOL 10 MG/ML EMULSION: Performed by: NURSE ANESTHETIST, CERTIFIED REGISTERED

## 2025-03-11 PROCEDURE — 25010000002 HEPARIN (PORCINE) PER 1000 UNITS: Performed by: STUDENT IN AN ORGANIZED HEALTH CARE EDUCATION/TRAINING PROGRAM

## 2025-03-11 PROCEDURE — 76937 US GUIDE VASCULAR ACCESS: CPT | Performed by: STUDENT IN AN ORGANIZED HEALTH CARE EDUCATION/TRAINING PROGRAM

## 2025-03-11 PROCEDURE — 02HV33Z INSERTION OF INFUSION DEVICE INTO SUPERIOR VENA CAVA, PERCUTANEOUS APPROACH: ICD-10-PCS | Performed by: STUDENT IN AN ORGANIZED HEALTH CARE EDUCATION/TRAINING PROGRAM

## 2025-03-11 PROCEDURE — 82948 REAGENT STRIP/BLOOD GLUCOSE: CPT

## 2025-03-11 PROCEDURE — 25010000002 LIDOCAINE 1 % SOLUTION 20 ML VIAL: Performed by: STUDENT IN AN ORGANIZED HEALTH CARE EDUCATION/TRAINING PROGRAM

## 2025-03-11 PROCEDURE — 76000 FLUOROSCOPY <1 HR PHYS/QHP: CPT

## 2025-03-11 PROCEDURE — 25010000002 PIPERACILLIN SOD-TAZOBACTAM PER 1 G: Performed by: PODIATRIST

## 2025-03-11 PROCEDURE — 36558 INSERT TUNNELED CV CATH: CPT | Performed by: STUDENT IN AN ORGANIZED HEALTH CARE EDUCATION/TRAINING PROGRAM

## 2025-03-11 PROCEDURE — 82550 ASSAY OF CK (CPK): CPT | Performed by: PODIATRIST

## 2025-03-11 PROCEDURE — 87340 HEPATITIS B SURFACE AG IA: CPT | Performed by: STUDENT IN AN ORGANIZED HEALTH CARE EDUCATION/TRAINING PROGRAM

## 2025-03-11 PROCEDURE — 80048 BASIC METABOLIC PNL TOTAL CA: CPT | Performed by: PODIATRIST

## 2025-03-11 PROCEDURE — 63710000001 INSULIN GLARGINE PER 5 UNITS: Performed by: STUDENT IN AN ORGANIZED HEALTH CARE EDUCATION/TRAINING PROGRAM

## 2025-03-11 PROCEDURE — 25010000002 FENTANYL CITRATE (PF) 50 MCG/ML SOLUTION: Performed by: NURSE ANESTHETIST, CERTIFIED REGISTERED

## 2025-03-11 PROCEDURE — C1750 CATH, HEMODIALYSIS,LONG-TERM: HCPCS | Performed by: STUDENT IN AN ORGANIZED HEALTH CARE EDUCATION/TRAINING PROGRAM

## 2025-03-11 RX ORDER — FENTANYL CITRATE 50 UG/ML
25 INJECTION, SOLUTION INTRAMUSCULAR; INTRAVENOUS
Status: DISCONTINUED | OUTPATIENT
Start: 2025-03-11 | End: 2025-03-11 | Stop reason: HOSPADM

## 2025-03-11 RX ORDER — LABETALOL HYDROCHLORIDE 5 MG/ML
5 INJECTION, SOLUTION INTRAVENOUS
Status: DISCONTINUED | OUTPATIENT
Start: 2025-03-11 | End: 2025-03-11 | Stop reason: HOSPADM

## 2025-03-11 RX ORDER — HYDROMORPHONE HYDROCHLORIDE 1 MG/ML
0.25 INJECTION, SOLUTION INTRAMUSCULAR; INTRAVENOUS; SUBCUTANEOUS
Status: DISCONTINUED | OUTPATIENT
Start: 2025-03-11 | End: 2025-03-11 | Stop reason: HOSPADM

## 2025-03-11 RX ORDER — FENTANYL CITRATE 50 UG/ML
50 INJECTION, SOLUTION INTRAMUSCULAR; INTRAVENOUS ONCE AS NEEDED
Status: DISCONTINUED | OUTPATIENT
Start: 2025-03-11 | End: 2025-03-11 | Stop reason: HOSPADM

## 2025-03-11 RX ORDER — FLUMAZENIL 0.1 MG/ML
0.2 INJECTION INTRAVENOUS AS NEEDED
Status: DISCONTINUED | OUTPATIENT
Start: 2025-03-11 | End: 2025-03-11 | Stop reason: HOSPADM

## 2025-03-11 RX ORDER — MIDAZOLAM HYDROCHLORIDE 1 MG/ML
0.5 INJECTION, SOLUTION INTRAMUSCULAR; INTRAVENOUS
Status: DISCONTINUED | OUTPATIENT
Start: 2025-03-11 | End: 2025-03-11 | Stop reason: HOSPADM

## 2025-03-11 RX ORDER — SODIUM CHLORIDE 0.9 % (FLUSH) 0.9 %
3 SYRINGE (ML) INJECTION EVERY 12 HOURS SCHEDULED
Status: DISCONTINUED | OUTPATIENT
Start: 2025-03-11 | End: 2025-03-11 | Stop reason: HOSPADM

## 2025-03-11 RX ORDER — ATROPINE SULFATE 0.4 MG/ML
0.4 INJECTION, SOLUTION INTRAMUSCULAR; INTRAVENOUS; SUBCUTANEOUS ONCE AS NEEDED
Status: DISCONTINUED | OUTPATIENT
Start: 2025-03-11 | End: 2025-03-11 | Stop reason: HOSPADM

## 2025-03-11 RX ORDER — ONDANSETRON 2 MG/ML
4 INJECTION INTRAMUSCULAR; INTRAVENOUS ONCE AS NEEDED
Status: DISCONTINUED | OUTPATIENT
Start: 2025-03-11 | End: 2025-03-11 | Stop reason: HOSPADM

## 2025-03-11 RX ORDER — EPHEDRINE SULFATE 50 MG/ML
5 INJECTION, SOLUTION INTRAVENOUS ONCE AS NEEDED
Status: DISCONTINUED | OUTPATIENT
Start: 2025-03-11 | End: 2025-03-11 | Stop reason: HOSPADM

## 2025-03-11 RX ORDER — DROPERIDOL 2.5 MG/ML
0.62 INJECTION, SOLUTION INTRAMUSCULAR; INTRAVENOUS
Status: DISCONTINUED | OUTPATIENT
Start: 2025-03-11 | End: 2025-03-11 | Stop reason: HOSPADM

## 2025-03-11 RX ORDER — IPRATROPIUM BROMIDE AND ALBUTEROL SULFATE 2.5; .5 MG/3ML; MG/3ML
3 SOLUTION RESPIRATORY (INHALATION) ONCE AS NEEDED
Status: DISCONTINUED | OUTPATIENT
Start: 2025-03-11 | End: 2025-03-11 | Stop reason: HOSPADM

## 2025-03-11 RX ORDER — HYDROCODONE BITARTRATE AND ACETAMINOPHEN 7.5; 325 MG/1; MG/1
1 TABLET ORAL EVERY 4 HOURS PRN
Status: DISCONTINUED | OUTPATIENT
Start: 2025-03-11 | End: 2025-03-11 | Stop reason: HOSPADM

## 2025-03-11 RX ORDER — HYDRALAZINE HYDROCHLORIDE 20 MG/ML
5 INJECTION INTRAMUSCULAR; INTRAVENOUS
Status: DISCONTINUED | OUTPATIENT
Start: 2025-03-11 | End: 2025-03-11 | Stop reason: HOSPADM

## 2025-03-11 RX ORDER — PROMETHAZINE HYDROCHLORIDE 25 MG/1
25 SUPPOSITORY RECTAL ONCE AS NEEDED
Status: DISCONTINUED | OUTPATIENT
Start: 2025-03-11 | End: 2025-03-11 | Stop reason: HOSPADM

## 2025-03-11 RX ORDER — LIDOCAINE HYDROCHLORIDE 20 MG/ML
INJECTION, SOLUTION INFILTRATION; PERINEURAL AS NEEDED
Status: DISCONTINUED | OUTPATIENT
Start: 2025-03-11 | End: 2025-03-11 | Stop reason: SURG

## 2025-03-11 RX ORDER — PROPOFOL 10 MG/ML
VIAL (ML) INTRAVENOUS AS NEEDED
Status: DISCONTINUED | OUTPATIENT
Start: 2025-03-11 | End: 2025-03-11 | Stop reason: SURG

## 2025-03-11 RX ORDER — NALOXONE HCL 0.4 MG/ML
0.2 VIAL (ML) INJECTION AS NEEDED
Status: DISCONTINUED | OUTPATIENT
Start: 2025-03-11 | End: 2025-03-11 | Stop reason: HOSPADM

## 2025-03-11 RX ORDER — DIPHENHYDRAMINE HYDROCHLORIDE 50 MG/ML
12.5 INJECTION, SOLUTION INTRAMUSCULAR; INTRAVENOUS
Status: DISCONTINUED | OUTPATIENT
Start: 2025-03-11 | End: 2025-03-11 | Stop reason: HOSPADM

## 2025-03-11 RX ORDER — MAGNESIUM HYDROXIDE 1200 MG/15ML
LIQUID ORAL AS NEEDED
Status: DISCONTINUED | OUTPATIENT
Start: 2025-03-11 | End: 2025-03-11 | Stop reason: HOSPADM

## 2025-03-11 RX ORDER — SODIUM CHLORIDE, SODIUM LACTATE, POTASSIUM CHLORIDE, CALCIUM CHLORIDE 600; 310; 30; 20 MG/100ML; MG/100ML; MG/100ML; MG/100ML
9 INJECTION, SOLUTION INTRAVENOUS CONTINUOUS
Status: DISCONTINUED | OUTPATIENT
Start: 2025-03-11 | End: 2025-03-11

## 2025-03-11 RX ORDER — LIDOCAINE HYDROCHLORIDE 10 MG/ML
0.5 INJECTION, SOLUTION INFILTRATION; PERINEURAL ONCE AS NEEDED
Status: DISCONTINUED | OUTPATIENT
Start: 2025-03-11 | End: 2025-03-11 | Stop reason: HOSPADM

## 2025-03-11 RX ORDER — SODIUM CHLORIDE 0.9 % (FLUSH) 0.9 %
3-10 SYRINGE (ML) INJECTION AS NEEDED
Status: DISCONTINUED | OUTPATIENT
Start: 2025-03-11 | End: 2025-03-11 | Stop reason: HOSPADM

## 2025-03-11 RX ORDER — PROMETHAZINE HYDROCHLORIDE 25 MG/1
25 TABLET ORAL ONCE AS NEEDED
Status: DISCONTINUED | OUTPATIENT
Start: 2025-03-11 | End: 2025-03-11 | Stop reason: HOSPADM

## 2025-03-11 RX ORDER — HEPARIN SODIUM 1000 [USP'U]/ML
INJECTION, SOLUTION INTRAVENOUS; SUBCUTANEOUS AS NEEDED
Status: DISCONTINUED | OUTPATIENT
Start: 2025-03-11 | End: 2025-03-11 | Stop reason: HOSPADM

## 2025-03-11 RX ORDER — HYDROCODONE BITARTRATE AND ACETAMINOPHEN 5; 325 MG/1; MG/1
1 TABLET ORAL ONCE AS NEEDED
Status: DISCONTINUED | OUTPATIENT
Start: 2025-03-11 | End: 2025-03-11 | Stop reason: HOSPADM

## 2025-03-11 RX ORDER — SODIUM CHLORIDE 9 MG/ML
INJECTION, SOLUTION INTRAVENOUS CONTINUOUS PRN
Status: DISCONTINUED | OUTPATIENT
Start: 2025-03-11 | End: 2025-03-11 | Stop reason: SURG

## 2025-03-11 RX ADMIN — PROPOFOL 150 MG: 10 INJECTION, EMULSION INTRAVENOUS at 15:54

## 2025-03-11 RX ADMIN — SODIUM CHLORIDE: 9 INJECTION, SOLUTION INTRAVENOUS at 15:45

## 2025-03-11 RX ADMIN — HYDRALAZINE HYDROCHLORIDE 10 MG: 10 TABLET ORAL at 23:49

## 2025-03-11 RX ADMIN — Medication 1 APPLICATION: at 09:24

## 2025-03-11 RX ADMIN — Medication 3 ML: at 09:24

## 2025-03-11 RX ADMIN — LIDOCAINE HYDROCHLORIDE 100 MG: 20 INJECTION, SOLUTION INFILTRATION; PERINEURAL at 15:54

## 2025-03-11 RX ADMIN — TAMSULOSIN HYDROCHLORIDE 0.4 MG: 0.4 CAPSULE ORAL at 09:22

## 2025-03-11 RX ADMIN — AMIODARONE HYDROCHLORIDE 200 MG: 200 TABLET ORAL at 09:22

## 2025-03-11 RX ADMIN — INSULIN GLARGINE 5 UNITS: 100 INJECTION, SOLUTION SUBCUTANEOUS at 23:48

## 2025-03-11 RX ADMIN — PANTOPRAZOLE SODIUM 40 MG: 40 TABLET, DELAYED RELEASE ORAL at 05:16

## 2025-03-11 RX ADMIN — FENTANYL CITRATE 50 MCG: 50 INJECTION, SOLUTION INTRAMUSCULAR; INTRAVENOUS at 15:59

## 2025-03-11 RX ADMIN — Medication 2.5 MG: at 23:49

## 2025-03-11 RX ADMIN — PIPERACILLIN AND TAZOBACTAM 4.5 G: 4; .5 INJECTION, POWDER, FOR SOLUTION INTRAVENOUS at 05:16

## 2025-03-11 RX ADMIN — ATORVASTATIN CALCIUM 20 MG: 20 TABLET, FILM COATED ORAL at 23:49

## 2025-03-11 RX ADMIN — MENTHOL, ZINC OXIDE 1 APPLICATION: .44; 20.6 OINTMENT TOPICAL at 09:24

## 2025-03-11 NOTE — PROGRESS NOTES
Name: Rock Maciel Jr. ADMIT: 3/3/2025   : 1945  PCP: Denise Dickson APRN    MRN: [id number] LOS: 8 days   AGE/SEX: 79 y.o. male  ROOM:  [facility] OR/MAIN OR     Vascular Surgery Progress Note    I was paged by PACU RN regarding patient's chest x-ray.  Very small apical pneumothorax seen.  Patient doing well otherwise with no dyspnea per my discussion with nurse.  I asked nurse to keep patient on oxygen and she confirmed patient will be going on a monitored floor on telemetry.  I've ordered a formal 2v CXR for the morning and informed Dr. Bonds.       Jeremi Ashby II, MD  25  18:01 EDT  Office Number [phone]    [facility] Vascular Surgery

## 2025-03-11 NOTE — PROGRESS NOTES
Infectious Diseases Progress Note    Nicolasa Denny MD     [facility]  Los: 8 days  Patient Identification:  Name: Rock Maciel Jr.  Age: 79 y.o.  Sex: male  :  1945  MRN: [id number]         Primary Care Physician: Denise Dickson APRN        Subjective: Had dialysis catheter placed with plans for dialysis on 3/12/2025 for worsening renal function.  Interval History: See consultation note.    Objective:    Scheduled Meds:amiodarone, 200 mg, Oral, Daily  [Held by provider] apixaban, 2.5 mg, Oral, BID  [Transfer Hold] vitamin C, 250 mg, Oral, Daily  [Transfer Hold] atorvastatin, 20 mg, Oral, Nightly  [Transfer Hold] bumetanide, 2 mg, Oral, BID Diuretics  [Transfer Hold] cholecalciferol, 1,000 Units, Oral, Daily  [Held by provider] clopidogrel, 75 mg, Oral, Daily  DAPTOmycin, 8 mg/kg (Adjusted), Intravenous, Q48H  [Transfer Hold] epoetin vince/vince-epbx, 10,000 Units, Intravenous, Once per day on   hydrALAZINE, 10 mg, Oral, TID  [Transfer Hold] insulin glargine, 5 Units, Subcutaneous, Nightly  [Transfer Hold] insulin lispro, 2-9 Units, Subcutaneous, 4x Daily AC & at Bedtime  [Transfer Hold] insulin lispro, 6 Units, Subcutaneous, TID With Meals  [Transfer Hold] linagliptin, 5 mg, Oral, Daily  [Transfer Hold] melatonin, 2.5 mg, Oral, Nightly  [Transfer Hold] Menthol-Zinc Oxide, 1 Application, Topical, Q12H  metoprolol succinate XL, 25 mg, Oral, Daily  [Transfer Hold] pantoprazole, 40 mg, Oral, Q AM  piperacillin-tazobactam, 4.5 g, Intravenous, Q12H  [Transfer Hold] sodium chloride, 3 mL, Intravenous, Q12H  [Transfer Hold] tamsulosin, 0.4 mg, Oral, Daily      Continuous Infusions:lactated ringers, 9 mL/hr  Pharmacy Consult - Pharmacy to dose,         Vital signs in last 24 hours:  Temp:  [97.2 °F (36.2 °C)-97.7 °F (36.5 °C)] 97.4 °F (36.3 °C)  Heart Rate:  [57-67] 57  Resp:  [16-21] 20  BP: (133-176)/(48-71) 174/64    Intake/Output:    Intake/Output Summary (Last 24  "hours) at 3/11/2025 1742  Last data filed at 3/11/2025 1649  Gross per 24 hour   Intake 200 ml   Output 200 ml   Net 0 ml       Exam:  /64   Pulse 57   Temp 97.4 °F (36.3 °C) (Oral)   Resp 20   Ht 185.4 cm (73\")   Wt 127 kg (280 lb 10.3 oz)   SpO2 95%   BMI 37.03 kg/m²   Patient is examined using the personal protective equipment as per guidelines from infection control for this particular patient as enacted.  Hand washing was performed before and after patient interaction.  General Appearance:  Lethargic but interactive                          Head:    Normocephalic, without obvious abnormality, atraumatic                           Eyes:    PERRL, pale conjunctiva                         Throat:   Lips, tongue, gums normal; oral mucosa pink and moist                           Neck:   Supple, symmetrical, trachea midline, no JVD                         Lungs:    Clear to auscultation bilaterally, respirations unlabored                 Chest Wall:    No tenderness or deformity left clavicular tenderness noted                          Heart:  S1-S2 irregular                  Abdomen:   Soft nontender                 Extremities: Right foot dressed after incision and drainage of multiple compartments and partial resection of the fourth metatarsal.                  Neurologic: Alert and oriented x 3       Data Review:    I reviewed the patient's new clinical results.  Results from last 7 days   Lab Units 03/11/25  0518 03/09/25  0714 03/08/25  0403 03/07/25  0449 03/06/25  0508 03/05/25  0604   WBC 10*3/mm3 9.23 9.95 11.08* 13.94* 15.51* 14.98*   HEMOGLOBIN g/dL 7.0* 7.6* 7.3* 7.3* 7.0* 6.2*   PLATELETS 10*3/mm3 263 261 238 215 193 182     Results from last 7 days   Lab Units 03/11/25  0518 03/10/25  0529 03/09/25  0714 03/08/25  0403 03/07/25  0449 03/06/25  0508 03/05/25  0604   SODIUM mmol/L 136 134* 138 138 138 135* 138   POTASSIUM mmol/L 4.9 4.5 4.3 4.1 3.9 4.1 3.9   CHLORIDE mmol/L 100 100 101 103 " 102 101 101   CO2 mmol/L 21.0* 19.8* 23.0 22.5 23.0 24.3 24.9   BUN mg/dL 107* 91* 89* 76* 69* 67* 71*   CREATININE mg/dL 6.67* 5.85* 5.05* 4.22* 3.97* 3.85* 3.91*   CALCIUM mg/dL 8.0* 7.8* 8.1* 8.1* 8.0* 8.0* 7.8*   GLUCOSE mg/dL 146* 131* 131* 119* 149* 147* 126*     Microbiology Results (last 10 days)       Procedure Component Value - Date/Time    Wound Culture - Wound, Foot, Right [[id number]] Collected: 03/10/25 0909    Lab Status: Preliminary result Specimen: Wound from Foot, Right Updated: 03/11/25 0914     Wound Culture Culture in progress     Gram Stain Rare (1+) Gram positive cocci      Rare (1+) WBCs seen    Wound Culture - Wound, Foot, Right [[id number]]  (Abnormal)  (Susceptibility) Collected: 03/06/25 1620    Lab Status: Edited Result - FINAL Specimen: Wound from Foot, Right Updated: 03/11/25 0837     Wound Culture Moderate growth (3+) Staphylococcus aureus, MRSA     Comment:   Methicillin resistant Staphylococcus aureus, Patient may be an isolation risk.         Scant growth (1+) Candida albicans      Light growth (2+) Normal Skin Ashley     Gram Stain Moderate (3+) WBCs per low power field      Few (2+) Gram positive cocci in pairs and clusters      Few (2+) Gram positive bacilli    Susceptibility        Staphylococcus aureus, MRSA      ABBIE      Clindamycin Susceptible      Daptomycin Susceptible (C)  [1]       Erythromycin Resistant      Oxacillin Resistant      Rifampin Susceptible      Tetracycline Susceptible      Trimethoprim + Sulfamethoxazole Resistant      Vancomycin Susceptible                   [1]  Appended report. These results have been appended to a previously final verified report.                Susceptibility Comments       Staphylococcus aureus, MRSA    Daptomycin released per Eduardo in Pharmacy               Legionella Antigen, Urine - Urine, Urine, Clean Catch [[id number]]  (Normal) Collected: 03/06/25 1143    Lab Status: Final result Specimen: Urine, Clean Catch Updated: 03/06/25 1252      LEGIONELLA ANTIGEN, URINE Negative    S. Pneumo Ag Urine or CSF - Urine, Urine, Clean Catch [[id number]]  (Normal) Collected: 03/06/25 1143    Lab Status: Final result Specimen: Urine, Clean Catch Updated: 03/06/25 1252     Strep Pneumo Ag Negative    MRSA Screen, PCR (Inpatient) - Swab, Nares [[id number]]  (Normal) Collected: 03/03/25 2220    Lab Status: Final result Specimen: Swab from Nares Updated: 03/04/25 0013     MRSA PCR No MRSA Detected    Narrative:      The negative predictive value of this diagnostic test is high and should only be used to consider de-escalating anti-MRSA therapy. A positive result may indicate colonization with MRSA and must be correlated clinically.    Blood Culture - Blood, Arm, Left [[id number]]  (Normal) Collected: 03/03/25 1747    Lab Status: Final result Specimen: Blood from Arm, Left Updated: 03/08/25 1815     Blood Culture No growth at 5 days    Blood Culture - Blood, Hand, Right [[id number]]  (Normal) Collected: 03/03/25 1745    Lab Status: Final result Specimen: Blood from Hand, Right Updated: 03/08/25 1815     Blood Culture No growth at 5 days              Assessment:    Traumatic rhabdomyolysis    Arteriosclerosis of coronary artery    Essential hypertension    Type 2 diabetes mellitus with circulatory disorder, without long-term current use of insulin    Hyperlipidemia    Charcot-Davida-Tooth disease-like deformity of foot    Fall    S/P CABG x 2    KILLIAN (acute kidney injury)    Chronic diastolic (congestive) heart failure    Stage 3b chronic kidney disease    Chronic anticoagulation    Persistent atrial fibrillation    Closed displaced fracture of left clavicle    Pneumonia due to infectious organism    ATN (acute tubular necrosis)    Contusion of left knee    Acute osteomyelitis of right foot    Diabetic foot infection    MRSA (methicillin resistant Staphylococcus aureus) infection    Acute kidney injury  1-probable systemic illness due to  2-evolving ongoing infection with  abscess of the right foot resulting in generalized weakness and  3-subsequent fall and fracture of left clavicle and left knee discomfort  4-diabetes mellitus  5-history of gout  6-sleep apnea  7-obesity  8-hypertension  9-severe anemia  10-acute on chronic renal failure - ESRD to start dialysis -3/12/2025  11-prior multiple diabetic foot infections and surgeries.  12-status post right IJ tunneled catheter with small post op pneumothorax     Recommendations/Discussions:  See my discussion and recommendations on 3/7/2025.  Continue with Zosyn and  daptomycin, avoid concomitant use of statin while he is taking daptomycin, asked microbiology for sensitivity of the Staph aureus/MRSA for daptomycin and monitor mild toxicity with periodic CPK levels-given the Gram stain and if there is no evidence of resistant pathogen then antibiotic therapy can be de-escalated to Unasyn.  Follow-up on operative culture results and depending upon operative findings and final surgery and subsequent concern for residual osteomyelitis final antibiotic regimen in terms of composition and duration would be recommended.  Nicolasa Denny MD  3/11/2025  17:42 EDT    Parts of this note may be an electronic transcription/translation of spoken language to printed text using the Dragon dictation system.

## 2025-03-11 NOTE — PROGRESS NOTES
Name: Rock Maciel Jr. ADMIT: 3/3/2025   : 1945  PCP: Denise Dickson APRN    MRN: [id number] LOS: 8 days   AGE/SEX: 79 y.o. male  ROOM: [facility]     Subjective   Subjective     I saw him in the PACU. His RN informed me that there was a small apical pneumothorax following the procedure. He was asymptomatic       Objective   Objective   Vital Signs  Temp:  [97.2 °F (36.2 °C)-97.7 °F (36.5 °C)] 97.3 °F (36.3 °C)  Heart Rate:  [57-69] 65  Resp:  [16-21] 20  BP: (133-179)/(46-71) 157/52  SpO2:  [79 %-99 %] 92 %  on  Flow (L/min) (Oxygen Therapy):  [2] 2;   Device (Oxygen Therapy): nasal cannula  Body mass index is 37.03 kg/m².  Physical Exam  Constitutional:       General: He is not in acute distress.     Appearance: He is obese. He is not toxic-appearing.   Cardiovascular:      Rate and Rhythm: Normal rate and regular rhythm.   Pulmonary:      Effort: Pulmonary effort is normal.      Breath sounds: Normal breath sounds.   Abdominal:      General: Bowel sounds are normal.      Palpations: Abdomen is soft.   Musculoskeletal:         General: No tenderness.      Right lower leg: No edema.      Left lower leg: No edema.      Comments: Left transmetatarsal amputation  Right foot bandaged   Skin:     Comments: Right chest tunneled dialysis catheter   Neurological:      Mental Status: He is alert.   Psychiatric:         Mood and Affect: Mood normal.         Behavior: Behavior normal.         Results Review     I reviewed the patient's new clinical results.  Results from last 7 days   Lab Units 25  0518 25  0714 25  0403 25  0449   WBC 10*3/mm3 9.23 9.95 11.08* 13.94*   HEMOGLOBIN g/dL 7.0* 7.6* 7.3* 7.3*   PLATELETS 10*3/mm3 263 261 238 215     Results from last 7 days   Lab Units 25  0518 03/10/25  0529 25  0714 25  0403   SODIUM mmol/L 136 134* 138 138   POTASSIUM mmol/L 4.9 4.5 4.3 4.1   CHLORIDE mmol/L 100 100 101 103   CO2 mmol/L 21.0* 19.8* 23.0 22.5   BUN mg/dL  "107* 91* 89* 76*   CREATININE mg/dL 6.67* 5.85* 5.05* 4.22*   GLUCOSE mg/dL 146* 131* 131* 119*   Estimated Creatinine Clearance: 12.3 mL/min (A) (by C-G formula based on SCr of 6.67 mg/dL (H)).  Results from last 7 days   Lab Units 03/06/25  0508 03/05/25  0604   ALBUMIN g/dL 2.5* 2.4*   BILIRUBIN mg/dL 0.9 0.6   ALK PHOS U/L 220* 163*   AST (SGOT) U/L 63* 82*   ALT (SGPT) U/L 61* 53*     Results from last 7 days   Lab Units 03/11/25  0518 03/10/25  0529 03/09/25  0714 03/08/25  0403 03/07/25  0449 03/06/25  0508 03/05/25  0604   CALCIUM mg/dL 8.0* 7.8* 8.1* 8.1*   < > 8.0* 7.8*   ALBUMIN g/dL  --   --   --   --   --  2.5* 2.4*    < > = values in this interval not displayed.     No results found for: \"COVID19\"  Glucose   Date/Time Value Ref Range Status   03/11/2025 1654 155 (H) 70 - 130 mg/dL Final   03/11/2025 1102 161 (H) 70 - 130 mg/dL Final   03/11/2025 0647 158 (H) 70 - 130 mg/dL Final   03/10/2025 2126 215 (H) 70 - 130 mg/dL Final   03/10/2025 1627 178 (H) 70 - 130 mg/dL Final   03/10/2025 0959 156 (H) 70 - 130 mg/dL Final   03/10/2025 0738 144 (H) 70 - 130 mg/dL Final       XR Chest Post CVA Port  XR CHEST POST CVA PORT-     CLINICAL HISTORY:  s/p tunneled catheter placement, eval for  pneumothorax; T79.6XXA-Traumatic ischemia of muscle, initial encounter;  N17.9-Acute kidney failure, unspecified; N18.9-Chronic kidney disease,  unspecified; A41.9-Sepsis, unspecified organism; J18.9-Pneumonia,  unspecified organism; R73.9-Hyperglycemia, unspecified; D64.9-Anemia,  unspecified; S42.032A-Displaced fracture of lateral end of left  clavicle, init     COMPARISON: 3/9/2025 a single portable view of the chest was obtained.  The study is hampered by the patient's body habitus. A dual-lumen  central line is in place with the tip at the junction of the superior  vena cava and right atrium. There is prominence of the pulmonary  interstitium and lesser extent vasculature similar in appearance as  compared to 3/9/2025. " There is no evidence of consolidation or effusion.  There is a lucency appreciated at the right lung apex suspicious for a  very small pneumothorax.     The above information was called to the patient's nurse. The patient's  nurse is to relay the information to the clinical service.           This report was finalized on 3/11/2025 5:43 PM by Dr. Bishop Tran M.D  on Workstation: [id number] [employer] [employer]     FL C Arm During Surgery  This procedure was auto-finalized with no dictation required.    Scheduled Medications  amiodarone, 200 mg, Oral, Daily  [Held by provider] apixaban, 2.5 mg, Oral, BID  vitamin C, 250 mg, Oral, Daily  atorvastatin, 20 mg, Oral, Nightly  bumetanide, 2 mg, Oral, BID Diuretics  cholecalciferol, 1,000 Units, Oral, Daily  [Held by provider] clopidogrel, 75 mg, Oral, Daily  DAPTOmycin, 8 mg/kg (Adjusted), Intravenous, Q48H  [START ON 3/12/2025] epoetin vince/vince-epbx, 10,000 Units, Intravenous, Once per day on Monday Wednesday Friday  hydrALAZINE, 10 mg, Oral, TID  insulin glargine, 5 Units, Subcutaneous, Nightly  insulin lispro, 2-9 Units, Subcutaneous, 4x Daily AC & at Bedtime  insulin lispro, 6 Units, Subcutaneous, TID With Meals  linagliptin, 5 mg, Oral, Daily  melatonin, 2.5 mg, Oral, Nightly  Menthol-Zinc Oxide, 1 Application, Topical, Q12H  metoprolol succinate XL, 25 mg, Oral, Daily  pantoprazole, 40 mg, Oral, Q AM  piperacillin-tazobactam, 4.5 g, Intravenous, Q12H  sodium chloride, 3 mL, Intravenous, Q12H  tamsulosin, 0.4 mg, Oral, Daily    Infusions  Pharmacy Consult - Pharmacy to dose,     Diet  No diet orders on file       Assessment/Plan     Active Hospital Problems    Diagnosis  POA    **Traumatic rhabdomyolysis [T79.6XXA]  Yes    MRSA (methicillin resistant Staphylococcus aureus) infection [A49.02]  Yes    Contusion of left knee [S80.02XA]  Yes    Acute osteomyelitis of right foot [M86.071]  Yes    Diabetic foot infection [E11.628, L08.9]  Yes    ATN (acute tubular necrosis) [N17.0]  Yes     Closed displaced fracture of left clavicle [S42.002A]  Yes    Pneumonia due to infectious organism [J18.9]  Yes    Acute kidney injury [N17.9]  Unknown    Persistent atrial fibrillation [I48.19]  Yes    Chronic anticoagulation [Z79.01]  Not Applicable    Stage 3b chronic kidney disease [N18.32]  Yes    Chronic diastolic (congestive) heart failure [I50.32]  Yes    KILLIAN (acute kidney injury) [N17.9]  Yes    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       79 y.o. male admitted with Traumatic rhabdomyolysis.    79 year old man who presented following a mechanical fall leading to rhabdomyolysis, left knee contusion with left knee effusion and meniscus tear, and a clavicular fracture. He was also found to have KILLIAN on CKD 4, pneumonia, osteomyelitis and abscess/cellulitis of the right foot.    Traumatic rhabdomyolysis-resolved with IVF.   KILLIAN on CKD 4-prerenal due to ATN. Now s/p tunneled dialysis cathter placement today with plans to initiate HD tomorrow  Post procedural right apical pneumothorax-tiny. Follow up chest xrays are planned for the AM  Pneumonia-on IV zosyn  Osteomyelitis and abscess of the right foot-s/p I&D and partial 4th metatarsal excision on 3/10/25 by Dr. Mchugh. There are plans to return to the OR tomorrow for debridement of the surgical site. Currently on daptomycin per ID.  Acute urinary retention/BPH-abdi catheter placed. Urology evaluated and recommended flomax and a voiding trial on the morning of discharge.  Left clavicular fracture-orthopedic surgery evaluated and recommended a sling as tolerated, ice, pain  control, and to avoid forward flexion for now  Left knee contusion with left knee effusion and meniscus tear-orthopedic surgery recommends a knee brace/immobilizer for at least 3 weeks and then begin range of motion exercises with PT  Anemia of chronic disease/CKD-hgb is down to 7 today. Transfuse if <7 with dialysis  Chronic afib-amiodarone. Eliquis is held acutely for procedures  Chronic diastolic heart failure-bumex  Coronary artery disease-plavix is held acutely for procedures. Statin/bb  Type 2 diabetes-A1c is 6. Glucose is at goal acutely on current regimen  Essential hypertension-adequate control acutely  Hyperlipidemia-statin  SCDs for DVT prophylaxis.  Full code.  Discussed with patient and nursing staff.  Anticipate discharge to SNU facility timing yet to be determined.      Yovanny Kowalski MD  [facility]  03/11/25  18:58 EDT

## 2025-03-11 NOTE — ANESTHESIA PREPROCEDURE EVALUATION
Anesthesia Evaluation     Patient summary reviewed   no history of anesthetic complications:   NPO Solid Status: > 8 hours  NPO Liquid Status: > 2 hours           Airway   Mallampati: II  TM distance: >3 FB  Possible difficult intubation  Dental      Pulmonary     breath sounds clear to auscultation  (+) pneumonia (2LNC since admission) stable ,sleep apnea on CPAP  (-) shortness of breath, recent URI, not a smoker    ROS comment: 3/9 CXR - 1. Stable cardiomegaly with indistinct pulmonary vasculature and hazy  groundglass opacities and interstitial thickening throughout both lungs,  suggesting mild pulmonary edema or atypical pneumonia, with a suspected  small left pleural effusion.  2. Chronic elevation of the right hemidiaphragm with right perihilar and  right basilar subsegmental atelectasis and/or scarring.    Cardiovascular     ECG reviewed  PT is on anticoagulation therapy  Patient on routine beta blocker and Beta blocker not taken-may be given intraoperatively  Rate: normal    (+) hypertension, valvular problems/murmurs murmur, CAD, CABG >6 Months, dysrhythmias Paroxysmal Atrial Fib, CHF , hyperlipidemia,  carotid artery disease (h/o bl CEA)  (-) past MI, angina      Neuro/Psych  (-) seizures, CVA  GI/Hepatic/Renal/Endo    (+) morbid obesity, hiatal hernia, renal disease (worsening fx, uremic, Cr 6.67; planned HD)- CRI and ARF, diabetes mellitus type 2    Musculoskeletal     (-) neck stiffness  Abdominal    Substance History      OB/GYN          Other   blood dyscrasia (Hb 7.0) anemia,     ROS/Med Hx Other: Last eliquis 3/7                  Anesthesia Plan    ASA 4     general     (I have reviewed the patient's history and chart with the patient, including all pertinent laboratory results and imaging. I have explained the risks of anesthesia including but not limited to dental damage, corneal abrasion, nerve injury, MI, stroke, aspiration, and death. Patient has agreed to proceed.  )  intravenous induction      Anesthetic plan, risks, benefits, and alternatives have been provided, discussed and informed consent has been obtained with: patient.      CODE STATUS:    Code Status (Patient has no pulse and is not breathing): CPR (Attempt to Resuscitate)  Medical Interventions (Patient has pulse or is breathing): Full Support  Level Of Support Discussed With: Patient

## 2025-03-11 NOTE — PROGRESS NOTES
Podiatry Progress Note      Patient: Rock Maciel Jr. Admit Date: 03/03/2025    Age: 79 y.o.   PCP: Denise Dickson APRN    MRN: [id number]  Room: [facility] Main OR/MAIN OR        Subjective     Chief Complaint     Chief Complaint   Patient presents with    Fall        HPI     Postop day #1 from partial metatarsal excision incision and drainage.  Patient seen today in PACU.  Doing well no complaints of pain notes foot.    Past Medical History     Past Medical History:   Diagnosis Date    Allergic rhinitis     Bronchitis     Coronary artery disease     Diabetes mellitus 2001    DM (diabetes mellitus)     Encounter for annual health examination 05/06/2014    Annual Health Assessment    Gout     Hiatal hernia     History of MRSA infection     RIGHT FOOT 2010    Hyperlipidemia     Hypertension     Murmur     Pneumothorax on right     Sleep apnea     pt wears CPAP at night    Wellness examination 06/24/2015    Annual Wellness Visit        Past Surgical History:   Procedure Laterality Date    ARTERY SURGERY Bilateral     carotid    BONE EXCISION LEG Right 3/10/2025    Procedure: BONE EXCISION LOWER EXTERMITY, RIGHT;  Surgeon: Jimenez Mchugh DPM;  Location: [facility] OR;  Service: Podiatry;  Laterality: Right;    CARDIAC CATHETERIZATION N/A 7/20/2018    Procedure: Left Heart Cath;  Surgeon: Jo Toney MD;  [facility];  Service: Cardiovascular    CARDIAC CATHETERIZATION N/A 7/20/2018    Procedure: Coronary angiography;  Surgeon: oJ Toney MD;  [facility];  Service: Cardiovascular    CAROTID ENDARTERECTOMY Bilateral     COLONOSCOPY  2008    CORONARY ARTERY BYPASS GRAFT WITH AORTIC VALVE REPAIR/REPLACEMENT N/A 7/23/2018    Procedure: INTRAOPERATIVE ALEX, MIDLINE STERNOTOMY, CORONARY ARTERY BYPASS GRAFTING X 3 USING ENDOSCOPICALLY HARVESTED LEFT GREATER SAPHENOUS VEIN,  AORTIC VALVE REPLACEMENT USING 25MM LOPEZ II ULTRA PORCINE VALVE,  PRP;  Surgeon: Phong Posey MD;  Location: [facility] MAIN OR;  Service: Cardiothoracic    FOOT SURGERY Right 2010    5th digit removal    FOOT SURGERY Left 2011    1 digit removed    INCISION AND DRAINAGE LEG Right 3/28/2020    Procedure: DEBRIDMENT OF RIGHT CALF;  Surgeon: Ross Pineda MD;  Location: [facility] MAIN OR;  Service: Vascular;  Laterality: Right;    INCISION AND DRAINAGE LEG Right 3/10/2025    Procedure: INCISION AND DRAINAGE LOWER EXTREMITY;  Surgeon: Jimenez Mchugh DPM;  Location: [facility] MAIN OR;  Service: Podiatry;  Laterality: Right;    INGUINAL HERNIA REPAIR Left 11/29/2024    Procedure: INGUINAL HERNIA REPAIR LAPAROSCOPIC WITH DAVINCI ROBOT;  Surgeon: Phong Lazo MD;  Location: [facility] MAIN OR;  Service: Robotics - DaVinci;  Laterality: Left;    QUADRICEPS TENDON REPAIR Left 5/14/2019    Procedure: Left QUADRICEPS TENDON REPAIR;  Surgeon: Camilo Hunter MD;  Location: [facility] MAIN OR;  Service: Orthopedics    THORACENTESIS Right 11/21/2016    THORACOSCOPY Right 5/8/2017    Procedure: BRONCHOSCOPY, RIGHT VAT,  TOTAL DECORTICATION RIGHT LUNG, PLEURAL BX, PLACEMENT SUBPLEURAL PAIN CAATHETERS X2;  Surgeon: Donald Orlando III, MD;  Location[facility] OR;  Service:     TONSILECTOMY, ADENOIDECTOMY, BILATERAL MYRINGOTOMY AND TUBES          Allergies   Allergen Reactions    Ceclor [Cefaclor] Rash     Rash in his 50s; he tolerated piperacillin-tazobactam in March 2020        Social History     Tobacco Use   Smoking Status Never    Passive exposure: Never   Smokeless Tobacco Never        Objective   Physical Exam    Vitals:    03/11/25 1730   BP: 174/64   Pulse: 57   Resp: 20   Temp:    SpO2: 95%        Dressing clean dry and intact to the right lower extremity    Labs     Lab Results   Component Value Date    HGBA1C 6.00 (H) 03/03/2025    POCGLU 155 (H) 03/11/2025    SEDRATE 95 (H) 03/26/2020        CBC:      Lab 03/11/25  0518 03/09/25  0714 03/08/25  0403 03/07/25  0449  03/06/25  0508   WBC 9.23 9.95 11.08* 13.94* 15.51*   HEMOGLOBIN 7.0* 7.6* 7.3* 7.3* 7.0*   HEMATOCRIT 22.5* 24.7* 22.9* 22.3* 21.6*   PLATELETS 263 261 238 215 193   MCV 92.6 93.6 90.5 90.3 91.1          Results for orders placed or performed during the hospital encounter of 03/03/25   Blood Culture - Blood, Arm, Left    Specimen: Arm, Left; Blood   Result Value Ref Range    Blood Culture No growth at 5 days         XR Chest Post CVA Port  XR CHEST POST CVA PORT-     CLINICAL HISTORY:  s/p tunneled catheter placement, eval for  pneumothorax; T79.6XXA-Traumatic ischemia of muscle, initial encounter;  N17.9-Acute kidney failure, unspecified; N18.9-Chronic kidney disease,  unspecified; A41.9-Sepsis, unspecified organism; J18.9-Pneumonia,  unspecified organism; R73.9-Hyperglycemia, unspecified; D64.9-Anemia,  unspecified; S42.032A-Displaced fracture of lateral end of left  clavicle, init     COMPARISON: 3/9/2025 a single portable view of the chest was obtained.  The study is hampered by the patient's body habitus. A dual-lumen  central line is in place with the tip at the junction of the superior  vena cava and right atrium. There is prominence of the pulmonary  interstitium and lesser extent vasculature similar in appearance as  compared to 3/9/2025. There is no evidence of consolidation or effusion.  There is a lucency appreciated at the right lung apex suspicious for a  very small pneumothorax.     The above information was called to the patient's nurse. The patient's  nurse is to relay the information to the clinical service.           This report was finalized on 3/11/2025 5:43 PM by Dr. Bishop Tran M.D  on Workstation: [id number]     FL C Arm During Surgery  This procedure was auto-finalized with no dictation required.       Assessment/Plan     79-year-old male postop day #1 from incision and drainage and partial fourth metatarsal excision    -Patient examined evaluated by myself  - Dressing changes per  nursing staff  - Plan to return to the operating room tomorrow morning for debridement of surgical site.  Discussed with patient I likely will not be able to close this area and he will need long term wound care likely including wound VAC.  Will have definitive plans tomorrow       Jimenez Mchugh DPM  Office: [phone]

## 2025-03-11 NOTE — PROGRESS NOTES
[facility]  Cardiology Progress Note    Patient Identification:  Name: Rock Maciel Jr.  Age: 79 y.o.  Sex: male  :  1945  MRN: [id number]                 Follow Up / Chief Complaint:  follow up preop clearance    Interval History: Resting in bed. No cp or shob. 3/10/25 surgery for Incision and drainage, multiple compartments, right foot partial fourth metatarsal excision, right foot by Dr. Mchugh.          Objective:    Past Medical History:  Past Medical History:   Diagnosis Date    Allergic rhinitis     Bronchitis     Coronary artery disease     Diabetes mellitus     DM (diabetes mellitus)     Encounter for annual health examination 2014    Annual Health Assessment    Gout     Hiatal hernia     History of MRSA infection     RIGHT FOOT     Hyperlipidemia     Hypertension     Murmur     Pneumothorax on right     Sleep apnea     pt wears CPAP at night    Wellness examination 2015    Annual Wellness Visit     Past Surgical History:  Past Surgical History:   Procedure Laterality Date    ARTERY SURGERY Bilateral     carotid    BONE EXCISION LEG Right 3/10/2025    Procedure: BONE EXCISION LOWER EXTERMITY, RIGHT;  Surgeon: Jimenez Mchugh DPM;  Location: [facility] OR;  Service: Podiatry;  Laterality: Right;    CARDIAC CATHETERIZATION N/A 2018    Procedure: Left Heart Cath;  Surgeon: Jo Toney MD;  Location: [facility] CATH INVASIVE LOCATION;  Service: Cardiovascular    CARDIAC CATHETERIZATION N/A 2018    Procedure: Coronary angiography;  Surgeon: Jo Toney MD;  Location: [facility] CATH INVASIVE LOCATION;  Service: Cardiovascular    CAROTID ENDARTERECTOMY Bilateral     COLONOSCOPY      CORONARY ARTERY BYPASS GRAFT WITH AORTIC VALVE REPAIR/REPLACEMENT N/A 2018    Procedure: INTRAOPERATIVE ALEX, MIDLINE STERNOTOMY, CORONARY ARTERY BYPASS GRAFTING X 3 USING ENDOSCOPICALLY HARVESTED LEFT GREATER SAPHENOUS VEIN,  AORTIC VALVE REPLACEMENT  USING 25MM LOPEZ II ULTRA PORCINE VALVE, PRP;  Surgeon: Phong Posey MD;  Location: [facility] MAIN OR;  Service: Cardiothoracic    FOOT SURGERY Right 2010    5th digit removal    FOOT SURGERY Left 2011    1 digit removed    INCISION AND DRAINAGE LEG Right 3/28/2020    Procedure: DEBRIDMENT OF RIGHT CALF;  Surgeon: Ross Pineda MD;  Location: [facility] MAIN OR;  Service: Vascular;  Laterality: Right;    INCISION AND DRAINAGE LEG Right 3/10/2025    Procedure: INCISION AND DRAINAGE LOWER EXTREMITY;  Surgeon: Jimenez Mchugh DPM;  Location: [facility] OR;  Service: Podiatry;  Laterality: Right;    INGUINAL HERNIA REPAIR Left 11/29/2024    Procedure: INGUINAL HERNIA REPAIR LAPAROSCOPIC WITH DAVINCI ROBOT;  Surgeon: Phong Lazo MD;  Location: [facility] OR;  Service: Robotics - DaVinci;  Laterality: Left;    QUADRICEPS TENDON REPAIR Left 5/14/2019    Procedure: Left QUADRICEPS TENDON REPAIR;  Surgeon: Camilo Hunter MD;  Location: [facility] OR;  Service: Orthopedics    THORACENTESIS Right 11/21/2016    THORACOSCOPY Right 5/8/2017    Procedure: BRONCHOSCOPY, RIGHT VAT,  TOTAL DECORTICATION RIGHT LUNG, PLEURAL BX, PLACEMENT SUBPLEURAL PAIN CAATHETERS X2;  Surgeon: Donald Orlando III, MD;  Location: [facility] OR;  Service:     TONSILECTOMY, ADENOIDECTOMY, BILATERAL MYRINGOTOMY AND TUBES          Social History:   Social History     Tobacco Use    Smoking status: Never     Passive exposure: Never    Smokeless tobacco: Never   Substance Use Topics    Alcohol use: Yes     Alcohol/week: 7.0 standard drinks of alcohol     Types: 7 Drinks containing 0.5 oz of alcohol per week     Comment: either one glass wine or one glass liquor per  day      Family History:  Family History   Problem Relation Age of Onset    Hypertension Father     Malig Hyperthermia Neg Hx           Allergies:  Allergies   Allergen Reactions    Ceclor [Cefaclor] Rash     Rash in his 50s; he tolerated piperacillin-tazobactam  in March 2020     Scheduled Meds:  amiodarone, 200 mg, Daily  [Held by provider] apixaban, 2.5 mg, BID  vitamin C, 250 mg, Daily  atorvastatin, 20 mg, Nightly  bumetanide, 2 mg, BID Diuretics  cholecalciferol, 1,000 Units, Daily  [Held by provider] clopidogrel, 75 mg, Daily  DAPTOmycin, 8 mg/kg (Adjusted), Q48H  [START ON 3/12/2025] epoetin vince/vince-epbx, 10,000 Units, Once per day on Monday Wednesday Friday  hydrALAZINE, 10 mg, TID  insulin glargine, 5 Units, Nightly  insulin lispro, 2-9 Units, 4x Daily AC & at Bedtime  insulin lispro, 6 Units, TID With Meals  linagliptin, 5 mg, Daily  melatonin, 2.5 mg, Nightly  Menthol-Zinc Oxide, 1 Application, Q12H  metoprolol succinate XL, 25 mg, Daily  pantoprazole, 40 mg, Q AM  piperacillin-tazobactam, 4.5 g, Q12H  sodium chloride, 3 mL, Q12H  tamsulosin, 0.4 mg, Daily            INTAKE AND OUTPUT:    Intake/Output Summary (Last 24 hours) at 3/11/2025 1420  Last data filed at 3/11/2025 0533  Gross per 24 hour   Intake --   Output 200 ml   Net -200 ml       Review of Systems:   GI: no n/v  Cardiac: no cp  Pulmonary: no shob    Constitutional:  Temp:  [97.2 °F (36.2 °C)-97.7 °F (36.5 °C)] 97.7 °F (36.5 °C)  Heart Rate:  [60-67] 62  Resp:  [16-18] 16  BP: (133-163)/(48-71) 163/59          Physical Exam:  General:  Alert, cooperative, appears in no acute distress  Respiratory: Clear to auscultation.  Normal respiratory effort and rate.  Cardiovascular: S1S2 Regular rate and rhythm. No murmur, rub or gallop.   Gastrointestinal: soft, non tender. Bowel sounds present.   Extremities: MARSHALL x4. No pretibial pitting edema. Adequate musculoskeletal strength.   Neuro: AAO x3 CN II-XII grossly intact      Cardiographics       ECG:     Echocardiogram:   10/2024  Interpretation Summary         Left ventricular ejection fraction appears to be 61 - 65%.    Left ventricular diastolic function was indeterminate.    The left atrial cavity is moderately dilated.    Left atrial volume is  "moderately increased.    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    Estimated right ventricular systolic pressure from tricuspid regurgitation is moderately elevated (45-55 mmHg).     Lab Review   Results from last 7 days   Lab Units 03/11/25  0518 03/10/25  0529 03/09/25  0714   CK TOTAL U/L 17* 20 39         Results from last 7 days   Lab Units 03/11/25  0518   SODIUM mmol/L 136   POTASSIUM mmol/L 4.9   BUN mg/dL 107*   CREATININE mg/dL 6.67*   CALCIUM mg/dL 8.0*     @LABRCNTIPbnp@  Results from last 7 days   Lab Units 03/11/25  0518 03/09/25  0714 03/08/25  0403   WBC 10*3/mm3 9.23 9.95 11.08*   HEMOGLOBIN g/dL 7.0* 7.6* 7.3*   HEMATOCRIT % 22.5* 24.7* 22.9*   PLATELETS 10*3/mm3 263 261 238               Assessment:    Preop clearance for foot surgery  Atrial fibrillation s/p cardioversion: SR. QTC stable. A/c on hold.   Chronic amiodarone therapy  Chronic anticoagulation  History of CAD    Plan:  Blood pressure and HR stable.   No change from our perspective.       )3/11/2025  ZEINA Fox/Transcription:   \"Dictated utilizing Dragon dictation\".     "

## 2025-03-11 NOTE — PROGRESS NOTES
"      FOLLOW UP NOTE      Name: Rock Maciel Jr. ADMIT: 3/3/2025   : 1945  PCP: Denise Dickson APRN    MRN: [id number] LOS: 8 days   AGE/SEX: 79 y.o. male  ROOM: [facility]     Date of Service: 3/11/2025                           CHIEF COMPLIANTS / REASON FOR FOLLOW UP                Subjective:    Resting comfortably in bed.  Oliguric, urine output only 200 mL.  No new complaints.     Review of Systems:     Negative except as above       OBJECTIVE                                                                        Exam:  /48 (BP Location: Left arm, Patient Position: Lying)   Pulse 61   Temp 97.3 °F (36.3 °C) (Oral)   Resp 18   Ht 185.4 cm (73\")   Wt 127 kg (280 lb 10.3 oz)   SpO2 (!) 79%   BMI 37.03 kg/m²   Intake/Output last 3 shifts:  I/O last 3 completed shifts:  In: 320 [I.V.:300; Other:20]  Out: 370 [Urine:370]  Intake/Output this shift:  No intake/output data recorded.    General Appearance: Chronically ill, pale appearing      Lungs:   Lungs largely clear, diminished at lung bases  Heart: RRR  Abdomen:  Soft, nontender  Extremities: Extremities wrapped, 1+ edema, knee effusion noted  Neurologic:   Alert and oriented  Johnson with brown urine    Scheduled Meds:amiodarone, 200 mg, Oral, Daily  [Held by provider] apixaban, 2.5 mg, Oral, BID  vitamin C, 250 mg, Oral, Daily  atorvastatin, 20 mg, Oral, Nightly  bumetanide, 2 mg, Oral, BID Diuretics  cholecalciferol, 1,000 Units, Oral, Daily  [Held by provider] clopidogrel, 75 mg, Oral, Daily  DAPTOmycin, 8 mg/kg (Adjusted), Intravenous, Q48H  [START ON 3/12/2025] epoetin vince/vince-epbx, 10,000 Units, Intravenous, Once per day on   hydrALAZINE, 10 mg, Oral, TID  insulin glargine, 5 Units, Subcutaneous, Nightly  insulin lispro, 2-9 Units, Subcutaneous, 4x Daily AC & at Bedtime  insulin lispro, 6 Units, Subcutaneous, TID With Meals  linagliptin, 5 mg, Oral, Daily  melatonin, 2.5 mg, Oral, Nightly  Menthol-Zinc Oxide, 1 " Application, Topical, Q12H  metoprolol succinate XL, 25 mg, Oral, Daily  pantoprazole, 40 mg, Oral, Q AM  piperacillin-tazobactam, 4.5 g, Intravenous, Q12H  sodium chloride, 3 mL, Intravenous, Q12H  tamsulosin, 0.4 mg, Oral, Daily      Continuous Infusions:Pharmacy Consult - Pharmacy to dose,         PRN Meds:  acetaminophen **OR** acetaminophen **OR** acetaminophen    artificial tears    senna-docusate sodium **AND** polyethylene glycol **AND** bisacodyl **AND** bisacodyl    cadexomer iodine    dextrose    dextrose    famotidine    glucagon (human recombinant)    HYDROcodone-acetaminophen    nitroglycerin    ondansetron ODT **OR** ondansetron    Pharmacy Consult - Pharmacy to dose    sodium chloride    sodium chloride         Data Review:                                                                           Labs reviewed        Imaging:                                                                           Radiology reviewed           ASSESSMENT:                                                                                Traumatic rhabdomyolysis    Arteriosclerosis of coronary artery    Essential hypertension    Type 2 diabetes mellitus with circulatory disorder, without long-term current use of insulin    Hyperlipidemia    Charcot-Davida-Tooth disease-like deformity of foot    Fall    S/P CABG x 2    KILLIAN (acute kidney injury)    Chronic diastolic (congestive) heart failure    Stage 3b chronic kidney disease    Chronic anticoagulation    Persistent atrial fibrillation    Closed displaced fracture of left clavicle    Pneumonia due to infectious organism    ATN (acute tubular necrosis)    Contusion of left knee    Acute osteomyelitis of right foot    Diabetic foot infection    MRSA (methicillin resistant Staphylococcus aureus) infection    Acute kidney injury         KILLIAN: likely ATN related to rhabdomyolysis, prerenal insult related to diuretics etc.  Needing RRT since 3/11/2025. Reason for GFR decline  likely multifactorial, some ATN, ongoing osteomyelitis etc..  Lower extremity osteomyelitis/cellulitis  CKD stage III-IV: Baseline creatinine  1.8-2.6 mg/dL with baseline GFR around 25 mL/min  Acute urinary retention requiring Johnson catheter placement.  Clavicle fracture  A-fib with controlled rates.  History of HFpEF with pulmonary hypertension: Slightly volume expanded.  Severe anemia in CKD:          PLAN:                                                                            Appreciate vascular help with TDC placement.  First HD treatment planned for today: 2 hours/low flows/exponential sodium model with 1 L UF goal.  Plan additional HD tomorrow for further clearance.  Podiatry following for foot osteomyelitis.  Antibiotics dosed for  CrCl<15 mL/min.  Follow-up with ID.  Switch to 3 times a week EPO with HD.   Continue diuretics.    D/w RN     Tiesha Mccrary MD  [facility]  3/11/2025  11:48 EDT

## 2025-03-11 NOTE — OP NOTE
Date of Admission:  3/3/2025  03/11/25  Jeaneth Bonds MD  [facility]    Preoperative Diagnosis: Acute renal failure    Postoperative Diagnosis: same as above    Procedure Performed:     1)  Tunneled catheter insertion into the Right internal jugular vein  2)  Ultrasound guided vascular access  3)  Radiologic supervision of catheter tip placement        Surgeon: Jeaneth Bonds MD    Assistant:  None    Anesthesia: General    Estimated Blood Loss:  Minimal    Findings:     PatentRight internal jugular vein vein on ultrasound.  Under real time ultrasound visualization needle access of the vein was performed.  Ultrasound image of access printed and stored in the medical record.    Both ports draw and flush without resistance    Successful  placement of a 23 cm Palindrome catheter in the Right internal jugular vein.    Implants:    Nothing was implanted during the procedure    Specimen: none    Complications: none    Dispo: to PACU    Indication for procedure: 79 y.o. male with CKD stage 3 at baseline with new acute kidney injury.  Nephrology has recommended initiation of dialysis and has requested tunneled catheter placement.      Description of procedure:     The patient was taken to the operating room and positioned supine on the operating table. Vein was interrogated with an ultrasound which appeared to be adequate for catheter placement. Surgical sites were prepped and draped in the usual sterile fashion. A full surgical timeout was performed with all OR staff present and in agreement.  Appropriate perioperative antibiotics were administered.  The skin overlying the vein was infiltrated with local anesthetic. Under ultrasound guidance, the vein was accessed with a micropuncture needle and wire was placed under fluoroscopic guidance into the cava.  A micropuncture catheter then a floppy J guidewire were placed and the J wire was advanced into the inferior vena cava under fluoroscopy.  15 bladed scalpel  was used to make a half centimeter skin neck at the neck puncture site and the vascular insertion site.  Local anesthetic was used to anesthetize the tunneling tract of the palindrome catheter.  Half centimeters skin neck was made at the planned exit site of the catheter.  Serial dilation was performed under fluoroscopic guidance to vascular access site with the supplied vascular dilators.  Peel-away sheath and dilator were placed under fluoroscopic guidance over the guidewire.  Palindrome catheter was tunneled from the exit site to the vascular insertion site.  Wire and dilator were removed from the peel-away sheath.  Catheter was placed down into the peel-away sheath into the superior vena cava.  Catheter was withdrawn until adequate tip placement was verified under fluoroscopic guidance.  Fluoroscopy of the apex of the catheter was performed to ensure no kinking.  Catheter was secured in place with nylon sutures.  The vascular access site was closed using Vicryl suture.  Sterile dressings were applied throughout.  All wires and sheaths were removed intact from the patient prior to the conclusion of the procedure.      Jeaneth Bonds MD  03/11/25    Active Hospital Problems    Diagnosis  POA    **Traumatic rhabdomyolysis [T79.6XXA]  Yes    MRSA (methicillin resistant Staphylococcus aureus) infection [A49.02]  Yes    Contusion of left knee [S80.02XA]  Yes    Acute osteomyelitis of right foot [M86.071]  Yes    Diabetic foot infection [E11.628, L08.9]  Yes    ATN (acute tubular necrosis) [N17.0]  Yes    Closed displaced fracture of left clavicle [S42.002A]  Yes    Pneumonia due to infectious organism [J18.9]  Yes    Acute kidney injury [N17.9]  Unknown    Persistent atrial fibrillation [I48.19]  Yes    Chronic anticoagulation [Z79.01]  Not Applicable    Stage 3b chronic kidney disease [N18.32]  Yes    Chronic diastolic (congestive) heart failure [I50.32]  Yes    KILLIAN (acute kidney injury) [N17.9]  Yes    S/P CABG  x 2 [Z95.1]  Not Applicable    Fall [W19.XXXA]  Yes    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       .

## 2025-03-11 NOTE — ANESTHESIA PROCEDURE NOTES
Airway  Reason: elective    Date/Time: 3/11/2025 3:55 PM  Airway not difficult    General Information and Staff    Patient location during procedure: OR  Anesthesiologist: Noel Santos MD  CRNA/CAA: Jamel Nunez CRNA    Indications and Patient Condition  Indications for airway management: airway protection    Preoxygenated: yes    Mask difficulty assessment: 1 - vent by mask    Final Airway Details    Final airway type: supraglottic airway      Successful airway: unique  Size: 4   Number of attempts at approach: 1  Assessment: lips, teeth, and gum same as pre-op and atraumatic intubation    Additional Comments  Pre 02 100%, SIVI, atraumatic intubation, BLBS, Positive ETC02.

## 2025-03-11 NOTE — ANESTHESIA POSTPROCEDURE EVALUATION
"Patient: Rock Maciel Jr.    Procedure Summary       Date: 03/11/25 Room / Location: [facility] OR 11 / [facility] MAIN OR    Anesthesia Start: 1545 Anesthesia Stop: 1651    Procedure: HEMODIALYSIS CATHETER INSERTION Diagnosis:       Stage 3b chronic kidney disease      Acute kidney injury      (Stage 3b chronic kidney disease [N18.32])      (Acute kidney injury [N17.9])    Surgeons: Jeaneth Bonds MD Provider: Noel Santos MD    Anesthesia Type: general ASA Status: 4            Anesthesia Type: general    Vitals  Vitals Value Taken Time   /46 03/11/25 18:04   Temp 36.3 °C (97.4 °F) 03/11/25 16:47   Pulse 61 03/11/25 18:06   Resp 20 03/11/25 17:30   SpO2 95 % 03/11/25 18:06   Vitals shown include unfiled device data.        Post Anesthesia Care and Evaluation    Pain management: adequate    Airway patency: patent  Anesthetic complications: No anesthetic complications    Cardiovascular status: acceptable  Respiratory status: acceptable  Hydration status: acceptable    Comments: */64   Pulse 57   Temp 36.3 °C (97.4 °F) (Oral)   Resp 20   Ht 185.4 cm (73\")   Wt 127 kg (280 lb 10.3 oz)   SpO2 95%   BMI 37.03 kg/m²       "

## 2025-03-11 NOTE — CASE MANAGEMENT/SOCIAL WORK
Continued Stay Note  [facility]     Patient Name: Rock Maciel Jr.  MRN: [id number]  Today's Date: 3/11/2025    Admit Date: 3/3/2025    Plan: SNF with onsite HD referrals pending.   Discharge Plan       Row Name 03/11/25 0941       Plan    Plan SNF with onsite HD referrals pending.    Patient/Family in Agreement with Plan yes    Plan Comments Clinicals reviewed. Noted TDC placement today. CCP met with pt at the bedside to discuss DC. CCP explained SNF referrals made but with TDC placement today previous SNF referrals do not have onsite HD. Pt verbalized understanding and is agreeable to [facility] referrals with onsite HD. Marlys/[facility], Tabitha/[facility], Hector/[facility] and Mirella/[facility] notified of referrals. Leonardo AYON/CCP                   Discharge Codes    No documentation.                 Expected Discharge Date and Time       Expected Discharge Date Expected Discharge Time    Mar 12, 2025               Pauly Martinez RN

## 2025-03-12 ENCOUNTER — ANESTHESIA EVENT (OUTPATIENT)
Dept: PERIOP | Facility: HOSPITAL | Age: 80
End: 2025-03-12
Payer: MEDICARE

## 2025-03-12 ENCOUNTER — APPOINTMENT (OUTPATIENT)
Dept: GENERAL RADIOLOGY | Facility: HOSPITAL | Age: 80
End: 2025-03-12
Payer: MEDICARE

## 2025-03-12 ENCOUNTER — ANESTHESIA (OUTPATIENT)
Dept: PERIOP | Facility: HOSPITAL | Age: 80
End: 2025-03-12
Payer: MEDICARE

## 2025-03-12 LAB
ABO GROUP BLD: NORMAL
ANION GAP SERPL CALCULATED.3IONS-SCNC: 14.3 MMOL/L (ref 5–15)
BASOPHILS # BLD AUTO: 0.03 10*3/MM3 (ref 0–0.2)
BASOPHILS NFR BLD AUTO: 0.3 % (ref 0–1.5)
BLD GP AB SCN SERPL QL: NEGATIVE
BUN SERPL-MCNC: 111 MG/DL (ref 8–23)
BUN/CREAT SERPL: 15 (ref 7–25)
CALCIUM SPEC-SCNC: 8.2 MG/DL (ref 8.6–10.5)
CHLORIDE SERPL-SCNC: 99 MMOL/L (ref 98–107)
CO2 SERPL-SCNC: 20.7 MMOL/L (ref 22–29)
CREAT SERPL-MCNC: 7.39 MG/DL (ref 0.76–1.27)
DEPRECATED RDW RBC AUTO: 48.4 FL (ref 37–54)
EGFRCR SERPLBLD CKD-EPI 2021: 6.9 ML/MIN/1.73
EOSINOPHIL # BLD AUTO: 0.16 10*3/MM3 (ref 0–0.4)
EOSINOPHIL NFR BLD AUTO: 1.8 % (ref 0.3–6.2)
ERYTHROCYTE [DISTWIDTH] IN BLOOD BY AUTOMATED COUNT: 14.6 % (ref 12.3–15.4)
GLUCOSE BLDC GLUCOMTR-MCNC: 125 MG/DL (ref 70–130)
GLUCOSE BLDC GLUCOMTR-MCNC: 156 MG/DL (ref 70–130)
GLUCOSE BLDC GLUCOMTR-MCNC: 168 MG/DL (ref 70–130)
GLUCOSE BLDC GLUCOMTR-MCNC: 201 MG/DL (ref 70–130)
GLUCOSE SERPL-MCNC: 152 MG/DL (ref 65–99)
HBV SURFACE AB SER RIA-ACNC: NORMAL
HCT VFR BLD AUTO: 21.4 % (ref 37.5–51)
HGB BLD-MCNC: 6.8 G/DL (ref 13–17.7)
IMM GRANULOCYTES # BLD AUTO: 0.38 10*3/MM3 (ref 0–0.05)
IMM GRANULOCYTES NFR BLD AUTO: 4.2 % (ref 0–0.5)
LYMPHOCYTES # BLD AUTO: 0.76 10*3/MM3 (ref 0.7–3.1)
LYMPHOCYTES NFR BLD AUTO: 8.3 % (ref 19.6–45.3)
MCH RBC QN AUTO: 29.4 PG (ref 26.6–33)
MCHC RBC AUTO-ENTMCNC: 31.8 G/DL (ref 31.5–35.7)
MCV RBC AUTO: 92.6 FL (ref 79–97)
MONOCYTES # BLD AUTO: 0.46 10*3/MM3 (ref 0.1–0.9)
MONOCYTES NFR BLD AUTO: 5 % (ref 5–12)
NEUTROPHILS NFR BLD AUTO: 7.35 10*3/MM3 (ref 1.7–7)
NEUTROPHILS NFR BLD AUTO: 80.4 % (ref 42.7–76)
NRBC BLD AUTO-RTO: 0 /100 WBC (ref 0–0.2)
PLATELET # BLD AUTO: 274 10*3/MM3 (ref 140–450)
PMV BLD AUTO: 9.4 FL (ref 6–12)
POTASSIUM SERPL-SCNC: 5.2 MMOL/L (ref 3.5–5.2)
RBC # BLD AUTO: 2.31 10*6/MM3 (ref 4.14–5.8)
RH BLD: POSITIVE
SODIUM SERPL-SCNC: 134 MMOL/L (ref 136–145)
T&S EXPIRATION DATE: NORMAL
WBC NRBC COR # BLD AUTO: 9.14 10*3/MM3 (ref 3.4–10.8)

## 2025-03-12 PROCEDURE — 86900 BLOOD TYPING SEROLOGIC ABO: CPT

## 2025-03-12 PROCEDURE — 86706 HEP B SURFACE ANTIBODY: CPT | Performed by: INTERNAL MEDICINE

## 2025-03-12 PROCEDURE — 86704 HEP B CORE ANTIBODY TOTAL: CPT | Performed by: INTERNAL MEDICINE

## 2025-03-12 PROCEDURE — 99024 POSTOP FOLLOW-UP VISIT: CPT | Performed by: STUDENT IN AN ORGANIZED HEALTH CARE EDUCATION/TRAINING PROGRAM

## 2025-03-12 PROCEDURE — 82948 REAGENT STRIP/BLOOD GLUCOSE: CPT

## 2025-03-12 PROCEDURE — 99232 SBSQ HOSP IP/OBS MODERATE 35: CPT

## 2025-03-12 PROCEDURE — 71046 X-RAY EXAM CHEST 2 VIEWS: CPT

## 2025-03-12 PROCEDURE — 86923 COMPATIBILITY TEST ELECTRIC: CPT

## 2025-03-12 PROCEDURE — 25010000002 HEPARIN (PORCINE) PER 1000 UNITS: Performed by: STUDENT IN AN ORGANIZED HEALTH CARE EDUCATION/TRAINING PROGRAM

## 2025-03-12 PROCEDURE — 63710000001 INSULIN GLARGINE PER 5 UNITS: Performed by: STUDENT IN AN ORGANIZED HEALTH CARE EDUCATION/TRAINING PROGRAM

## 2025-03-12 PROCEDURE — 25010000002 DAPTOMYCIN PER 1 MG: Performed by: INTERNAL MEDICINE

## 2025-03-12 PROCEDURE — 86900 BLOOD TYPING SEROLOGIC ABO: CPT | Performed by: PODIATRIST

## 2025-03-12 PROCEDURE — 80048 BASIC METABOLIC PNL TOTAL CA: CPT | Performed by: ANESTHESIOLOGY

## 2025-03-12 PROCEDURE — 71045 X-RAY EXAM CHEST 1 VIEW: CPT

## 2025-03-12 PROCEDURE — P9016 RBC LEUKOCYTES REDUCED: HCPCS

## 2025-03-12 PROCEDURE — 86850 RBC ANTIBODY SCREEN: CPT | Performed by: PODIATRIST

## 2025-03-12 PROCEDURE — 63710000001 INSULIN LISPRO (HUMAN) PER 5 UNITS: Performed by: STUDENT IN AN ORGANIZED HEALTH CARE EDUCATION/TRAINING PROGRAM

## 2025-03-12 PROCEDURE — G0463 HOSPITAL OUTPT CLINIC VISIT: HCPCS | Performed by: PODIATRIST

## 2025-03-12 PROCEDURE — 85025 COMPLETE CBC W/AUTO DIFF WBC: CPT | Performed by: ANESTHESIOLOGY

## 2025-03-12 PROCEDURE — 86901 BLOOD TYPING SEROLOGIC RH(D): CPT | Performed by: PODIATRIST

## 2025-03-12 PROCEDURE — 25010000002 PIPERACILLIN SOD-TAZOBACTAM PER 1 G: Performed by: INTERNAL MEDICINE

## 2025-03-12 PROCEDURE — 25010000002 EPOETIN ALFA-EPBX 10000 UNIT/ML SOLUTION: Performed by: STUDENT IN AN ORGANIZED HEALTH CARE EDUCATION/TRAINING PROGRAM

## 2025-03-12 RX ORDER — SODIUM CHLORIDE, SODIUM LACTATE, POTASSIUM CHLORIDE, CALCIUM CHLORIDE 600; 310; 30; 20 MG/100ML; MG/100ML; MG/100ML; MG/100ML
9 INJECTION, SOLUTION INTRAVENOUS CONTINUOUS
Status: ACTIVE | OUTPATIENT
Start: 2025-03-12 | End: 2025-03-13

## 2025-03-12 RX ORDER — FENTANYL CITRATE 50 UG/ML
50 INJECTION, SOLUTION INTRAMUSCULAR; INTRAVENOUS ONCE AS NEEDED
Status: DISCONTINUED | OUTPATIENT
Start: 2025-03-12 | End: 2025-03-12 | Stop reason: HOSPADM

## 2025-03-12 RX ORDER — SODIUM CHLORIDE 0.9 % (FLUSH) 0.9 %
3-10 SYRINGE (ML) INJECTION AS NEEDED
Status: DISCONTINUED | OUTPATIENT
Start: 2025-03-12 | End: 2025-03-12 | Stop reason: HOSPADM

## 2025-03-12 RX ORDER — FAMOTIDINE 10 MG/ML
20 INJECTION, SOLUTION INTRAVENOUS ONCE
Status: DISCONTINUED | OUTPATIENT
Start: 2025-03-12 | End: 2025-03-12 | Stop reason: HOSPADM

## 2025-03-12 RX ORDER — LIDOCAINE HYDROCHLORIDE 10 MG/ML
0.5 INJECTION, SOLUTION INFILTRATION; PERINEURAL ONCE AS NEEDED
Status: DISCONTINUED | OUTPATIENT
Start: 2025-03-12 | End: 2025-03-12 | Stop reason: HOSPADM

## 2025-03-12 RX ORDER — SODIUM CHLORIDE 0.9 % (FLUSH) 0.9 %
3 SYRINGE (ML) INJECTION EVERY 12 HOURS SCHEDULED
Status: DISCONTINUED | OUTPATIENT
Start: 2025-03-12 | End: 2025-03-12 | Stop reason: HOSPADM

## 2025-03-12 RX ORDER — MIDAZOLAM HYDROCHLORIDE 1 MG/ML
0.5 INJECTION, SOLUTION INTRAMUSCULAR; INTRAVENOUS
Status: DISCONTINUED | OUTPATIENT
Start: 2025-03-12 | End: 2025-03-12 | Stop reason: HOSPADM

## 2025-03-12 RX ORDER — HEPARIN SODIUM 1000 [USP'U]/ML
4000 INJECTION, SOLUTION INTRAVENOUS; SUBCUTANEOUS AS NEEDED
Status: DISCONTINUED | OUTPATIENT
Start: 2025-03-12 | End: 2025-03-19 | Stop reason: HOSPADM

## 2025-03-12 RX ADMIN — Medication 3 ML: at 09:45

## 2025-03-12 RX ADMIN — PIPERACILLIN AND TAZOBACTAM 4.5 G: 4; .5 INJECTION, POWDER, FOR SOLUTION INTRAVENOUS at 18:16

## 2025-03-12 RX ADMIN — DAPTOMYCIN 800 MG: 500 INJECTION, POWDER, LYOPHILIZED, FOR SOLUTION INTRAVENOUS at 18:17

## 2025-03-12 RX ADMIN — HYDRALAZINE HYDROCHLORIDE 10 MG: 10 TABLET ORAL at 09:38

## 2025-03-12 RX ADMIN — PANTOPRAZOLE SODIUM 40 MG: 40 TABLET, DELAYED RELEASE ORAL at 09:38

## 2025-03-12 RX ADMIN — AMIODARONE HYDROCHLORIDE 200 MG: 200 TABLET ORAL at 09:38

## 2025-03-12 RX ADMIN — BUMETANIDE 2 MG: 1 TABLET ORAL at 18:16

## 2025-03-12 RX ADMIN — OXYCODONE HYDROCHLORIDE AND ACETAMINOPHEN 250 MG: 500 TABLET ORAL at 09:38

## 2025-03-12 RX ADMIN — ATORVASTATIN CALCIUM 20 MG: 20 TABLET, FILM COATED ORAL at 21:52

## 2025-03-12 RX ADMIN — EPOETIN ALFA-EPBX 10000 UNITS: 10000 INJECTION, SOLUTION INTRAVENOUS; SUBCUTANEOUS at 13:53

## 2025-03-12 RX ADMIN — HEPARIN SODIUM 4000 UNITS: 1000 INJECTION INTRAVENOUS; SUBCUTANEOUS at 16:15

## 2025-03-12 RX ADMIN — BUMETANIDE 2 MG: 1 TABLET ORAL at 09:38

## 2025-03-12 RX ADMIN — Medication 1000 UNITS: at 09:38

## 2025-03-12 RX ADMIN — Medication 3 ML: at 21:52

## 2025-03-12 RX ADMIN — MENTHOL, ZINC OXIDE 1 APPLICATION: .44; 20.6 OINTMENT TOPICAL at 09:45

## 2025-03-12 RX ADMIN — LINAGLIPTIN 5 MG: 5 TABLET, FILM COATED ORAL at 09:38

## 2025-03-12 RX ADMIN — METOPROLOL SUCCINATE 25 MG: 25 TABLET, EXTENDED RELEASE ORAL at 05:47

## 2025-03-12 RX ADMIN — Medication 2.5 MG: at 21:52

## 2025-03-12 RX ADMIN — INSULIN GLARGINE 5 UNITS: 100 INJECTION, SOLUTION SUBCUTANEOUS at 21:52

## 2025-03-12 RX ADMIN — EPOETIN ALFA-EPBX 10000 UNITS: 10000 INJECTION, SOLUTION INTRAVENOUS; SUBCUTANEOUS at 16:16

## 2025-03-12 RX ADMIN — TAMSULOSIN HYDROCHLORIDE 0.4 MG: 0.4 CAPSULE ORAL at 09:38

## 2025-03-12 RX ADMIN — HYDRALAZINE HYDROCHLORIDE 10 MG: 10 TABLET ORAL at 21:52

## 2025-03-12 RX ADMIN — INSULIN LISPRO 4 UNITS: 100 INJECTION, SOLUTION INTRAVENOUS; SUBCUTANEOUS at 21:52

## 2025-03-12 RX ADMIN — MENTHOL, ZINC OXIDE 1 APPLICATION: .44; 20.6 OINTMENT TOPICAL at 21:52

## 2025-03-12 NOTE — PLAN OF CARE
Goal Outcome Evaluation:  Plan of Care Reviewed With: patient   Pt's vital signs are stable. Went for dialysis- 1L was pulled. Plans for debridement. Awaiting for verification for 1 unit of PRBC. Care plan ongoing.      Progress: no change

## 2025-03-12 NOTE — PROGRESS NOTES
Please cancel patient's surgery this morning for debridement of the right foot.  Patient had some moderate amount of blood loss during his surgery 2 days ago and hiss hemoglobin is still 7.  Type and screen performed and also has a small pneumothorax.  I discussed this with Dr. Ashby the vascular surgeon as well as Dr. Wolff the anesthesiologist and at this time I will cancel surgery.  Will wait for him to be more hemodynamically stable and plan for surgery next 1 to 2 days.  Will update team with time

## 2025-03-12 NOTE — PAYOR COMM NOTE
"Madyson Maciel JrJc (79 y.o. Male)     ATTN: NURSE REVIEWER  [facility]   REF# [id number]  PLS REPLY TO HERIBERTO JONES [phone] OR FAX# [phone]      Date of Birth   1945    Social Security Number       Address   [address] [place] [address]    Home Phone   [phone]    MRN   [id number]       Episcopal   Jewish    Marital Status                               Admission Date   3/3/2025    Admission Type   Emergency    Admitting Provider   Bishop Chakraborty MD    Attending Provider   Yovanny Kowalski MD    Department, Room/Bed   [address], S[address]/1       Discharge Date       Discharge Disposition       Discharge Destination                                 Attending Provider: Yovanny Kowalski MD    Allergies: Ceclor [Cefaclor]    Isolation: Contact   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/08/25)   Code Status: CPR    Ht: 185.4 cm (73\")   Wt: 127 kg (280 lb 10.3 oz)    Admission Cmt: None   Principal Problem: Traumatic rhabdomyolysis [T79.6XXA]                   Active Insurance as of 3/3/2025       Primary Coverage       Payor Plan Insurance Group Employer/Plan Group    ANTHEM MEDICARE REPLACEMENT ANTH MEDICARE ADVANTAGE HMO [id number]       Payor Plan Address Payor Plan Phone Number Payor Plan Fax Number Effective Dates    [address] [phone]  1/1/2025 - None Entered    [facility] [address]         Subscriber Name Subscriber Birth Date Member ID       MDAYSON MACIEL JR. 1945 [id number]                     Emergency Contacts        (Rel.) Home Phone Work Phone Mobile Phone    Claudette Maciel (Daughter) [phone] -- [phone]    RAJ MEJIA (Friend) [phone] -- [phone]              Facility-Administered Medications as of 3/12/2025   Medication Dose Route Frequency Provider Last Rate Last Admin    amiodarone (PACERONE) tablet 200 mg  200 mg Oral Daily Jeaneth Bonds MD   200 mg at " 03/12/25 0938    [Held by provider] apixaban (ELIQUIS) tablet 2.5 mg  2.5 mg Oral BID Jeaneth Bonds MD   2.5 mg at 03/07/25 0950    artificial tears ophthalmic ointment   Both Eyes Q1H PRN Jeaneth Bonds MD        ascorbic acid (VITAMIN C) tablet 250 mg  250 mg Oral Daily Jeaneth Bonds MD   250 mg at 03/12/25 0938    atorvastatin (LIPITOR) tablet 20 mg  20 mg Oral Nightly Jeaneth Bonds MD   20 mg at 03/11/25 2349    sennosides-docusate (PERICOLACE) 8.6-50 MG per tablet 2 tablet  2 tablet Oral BID PRN Jeaneth Bonds MD   2 tablet at 03/07/25 2128    And    polyethylene glycol (MIRALAX) packet 17 g  17 g Oral Daily PRN Jeaneth Bonds MD   17 g at 03/08/25 2121    And    bisacodyl (DULCOLAX) EC tablet 5 mg  5 mg Oral Daily PRN Jeaneth Bonds MD   5 mg at 03/08/25 1845    And    bisacodyl (DULCOLAX) suppository 10 mg  10 mg Rectal Daily PRN Jeaneth Bonds MD   10 mg at 03/09/25 2120    bumetanide (BUMEX) tablet 2 mg  2 mg Oral BID Diuretics Jeaneth Bonds MD   2 mg at 03/12/25 0938    cadexomer iodine (IODOSORB) 0.9 % gel 1 Application  1 Application Topical Daily PRN Jeaneth Bonds MD   1 Application at 03/11/25 0924    cholecalciferol (VITAMIN D3) tablet 1,000 Units  1,000 Units Oral Daily Jeaneth Bonds MD   1,000 Units at 03/12/25 0938    [Held by provider] clopidogrel (PLAVIX) tablet 75 mg  75 mg Oral Daily Jeaneth Bonds MD        DAPTOmycin (CUBICIN) 800 mg in sodium chloride 0.9 % 50 mL IVPB  8 mg/kg (Adjusted) Intravenous Q48H Jeaneth Bonds  mL/hr at 03/10/25 1524 800 mg at 03/10/25 1524    dextrose (D50W) (25 g/50 mL) IV injection 25 g  25 g Intravenous Q15 Min PRN Jeaneth Bonds MD        dextrose (GLUTOSE) oral gel 15 g  15 g Oral Q15 Min PRN Jeaneth Bonds MD        epoetin vince-epbx (RETACRIT) injection 10,000 Units  10,000 Units Intravenous Once per day on Monday Wednesday Friday Jeaneth Bonds MD         famotidine (PEPCID) tablet 10 mg  10 mg Oral BID PRN Jeaneth Bonds MD        glucagon (GLUCAGEN) injection 1 mg  1 mg Intramuscular Q15 Min PRN Jeaneth Bonds MD        hydrALAZINE (APRESOLINE) tablet 10 mg  10 mg Oral TID Jeaneth Bonds MD   10 mg at 03/12/25 0938    HYDROcodone-acetaminophen (NORCO) 5-325 MG per tablet 0.5 tablet  0.5 tablet Oral Q6H PRN Jeaneth Bonds MD   0.5 tablet at 03/09/25 0846    insulin glargine (LANTUS, SEMGLEE) injection 5 Units  5 Units Subcutaneous Nightly Jeaneth Bonds MD   5 Units at 03/11/25 2348    insulin lispro (HUMALOG/ADMELOG) injection 2-9 Units  2-9 Units Subcutaneous 4x Daily AC & at Bedtime Jeaneth Bonds MD   4 Units at 03/10/25 2152    insulin lispro (HUMALOG/ADMELOG) injection 6 Units  6 Units Subcutaneous TID With Meals Jeaneth Bonds MD   6 Units at 03/10/25 1817    lactated ringers infusion  9 mL/hr Intravenous Continuous YeDenise nunez MD   Stopped at 03/12/25 0944    linagliptin (TRADJENTA) tablet 5 mg  5 mg Oral Daily Jeaneth Bonds MD   5 mg at 03/12/25 0938    melatonin tablet 2.5 mg  2.5 mg Oral Nightly Jeaneth Bonds MD   2.5 mg at 03/11/25 2349    Menthol-Zinc Oxide 1 Application  1 Application Topical Q12H Jeaneth Bonds MD   1 Application at 03/12/25 0945    metoprolol succinate XL (TOPROL-XL) 24 hr tablet 25 mg  25 mg Oral Daily Jeaneth Bonds MD   25 mg at 03/12/25 0547    nitroglycerin (NITROSTAT) SL tablet 0.4 mg  0.4 mg Sublingual Q5 Min PRN Jeaneth Bonds MD        ondansetron ODT (ZOFRAN-ODT) disintegrating tablet 4 mg  4 mg Oral Q6H PRN Jeaneth Bonds MD        Or    ondansetron (ZOFRAN) injection 4 mg  4 mg Intravenous Q6H PRN Jeaneth Bonds MD        pantoprazole (PROTONIX) EC tablet 40 mg  40 mg Oral Q AM Jeaneth Bonds MD   40 mg at 03/12/25 0912    Pharmacy Consult - Pharmacy to dose   Not Applicable Continuous PRN Jeaneth Bonds MD         [COMPLETED] piperacillin-tazobactam (ZOSYN) 4.5 g IVPB in 100 mL NS MBP (CD)  4.5 g Intravenous Once Gunner Garcia MD   Stopped at 03/03/25 1854    piperacillin-tazobactam (ZOSYN) 4.5 g IVPB in 100 mL NS MBP (CD)  4.5 g Intravenous Q12H Jeaneth Bonds MD   4.5 g at 03/11/25 0516    [COMPLETED] sodium chloride 0.9 % bolus 1,000 mL  1,000 mL Intravenous Once Gunner Garcia MD   Stopped at 03/03/25 1811    sodium chloride 0.9 % flush 3 mL  3 mL Intravenous Q12H Jeaneth Bonds MD   3 mL at 03/12/25 0945    sodium chloride 0.9 % flush 3-10 mL  3-10 mL Intravenous PRN Jeaneth Bonds MD        sodium chloride 0.9 % infusion 40 mL  40 mL Intravenous PRN Jeaneth Bonds MD        tamsulosin (FLOMAX) 24 hr capsule 0.4 mg  0.4 mg Oral Daily Jeaneth Bonds MD   0.4 mg at 03/12/25 0938    [COMPLETED] vancomycin 2500 mg/500 mL 0.9% NS IVPB ([facility])  20 mg/kg Intravenous Once Gunner Garcia MD   2,500 mg at 03/03/25 1854     Lab Results (last 24 hours)       Procedure Component Value Units Date/Time    Wound Culture - Wound, Foot, Right [[id number]]  (Abnormal) Collected: 03/10/25 0909    Specimen: Wound from Foot, Right Updated: 03/12/25 0942     Wound Culture Scant growth (1+) Staphylococcus aureus, MRSA     Comment:   Methicillin resistant Staphylococcus aureus, Patient may be an isolation risk.        Gram Stain Rare (1+) Gram positive cocci      Rare (1+) WBCs seen    Narrative:      Organism under investigation.      Basic Metabolic Panel [[id number]]  (Abnormal) Collected: 03/12/25 0727    Specimen: Blood Updated: 03/12/25 0803     Glucose 152 mg/dL       mg/dL      Creatinine 7.39 mg/dL      Sodium 134 mmol/L      Potassium 5.2 mmol/L      Chloride 99 mmol/L      CO2 20.7 mmol/L      Calcium 8.2 mg/dL      BUN/Creatinine Ratio 15.0     Anion Gap 14.3 mmol/L      eGFR 6.9 mL/min/1.73     Narrative:      GFR Categories in Chronic Kidney Disease (CKD)      GFR Category          GFR  (mL/min/1.73)    Interpretation  G1                     90 or greater         Normal or high (1)  G2                      60-89                Mild decrease (1)  G3a                   45-59                Mild to moderate decrease  G3b                   30-44                Moderate to severe decrease  G4                    15-29                Severe decrease  G5                    14 or less           Kidney failure          (1)In the absence of evidence of kidney disease, neither GFR category G1 or G2 fulfill the criteria for CKD.    eGFR calculation 2021 CKD-EPI creatinine equation, which does not include race as a factor    CBC & Differential [[id number]]  (Abnormal) Collected: 03/12/25 0727    Specimen: Blood Updated: 03/12/25 0745    Narrative:      The following orders were created for panel order CBC & Differential.  Procedure                               Abnormality         Status                     ---------                               -----------         ------                     CBC Auto Differential[[id number]]        Abnormal            Final result                 Please view results for these tests on the individual orders.    CBC Auto Differential [[id number]]  (Abnormal) Collected: 03/12/25 0727    Specimen: Blood Updated: 03/12/25 0745     WBC 9.14 10*3/mm3      RBC 2.31 10*6/mm3      Hemoglobin 6.8 g/dL      Hematocrit 21.4 %      MCV 92.6 fL      MCH 29.4 pg      MCHC 31.8 g/dL      RDW 14.6 %      RDW-SD 48.4 fl      MPV 9.4 fL      Platelets 274 10*3/mm3      Neutrophil % 80.4 %      Lymphocyte % 8.3 %      Monocyte % 5.0 %      Eosinophil % 1.8 %      Basophil % 0.3 %      Immature Grans % 4.2 %      Neutrophils, Absolute 7.35 10*3/mm3      Lymphocytes, Absolute 0.76 10*3/mm3      Monocytes, Absolute 0.46 10*3/mm3      Eosinophils, Absolute 0.16 10*3/mm3      Basophils, Absolute 0.03 10*3/mm3      Immature Grans, Absolute 0.38 10*3/mm3      nRBC 0.0 /100 WBC     POC Glucose Once  "[[id number]]  (Abnormal) Collected: 03/12/25 0606    Specimen: Blood Updated: 03/12/25 0608     Glucose 168 mg/dL     POC Glucose Once [[id number]]  (Abnormal) Collected: 03/11/25 2121    Specimen: Blood Updated: 03/11/25 2123     Glucose 168 mg/dL     Hepatitis B Surface Antigen [[id number]]  (Normal) Collected: 03/11/25 1929    Specimen: Blood from Arm, Right Updated: 03/11/25 2111     Hepatitis B Surface Ag Non-Reactive    POC Glucose Once [[id number]]  (Abnormal) Collected: 03/11/25 1654    Specimen: Blood Updated: 03/11/25 1655     Glucose 155 mg/dL     Tissue Pathology Exam [[id number]] Collected: 03/10/25 0911    Specimen: Tissue from Foot, Right Updated: 03/11/25 1143     Case Report --     Surgical Pathology Report                         Case: [id number]                                Authorizing Provider:  Jimenez Mchugh DPM      Collected:           03/10/2025 09:11 AM        Ordering Location:     [facility]  Received:            03/10/2025 11:22 AM                               MAIN OR                                                                    Pathologist:           Eze Posadas MD                                                        Specimen:    Foot, Right, 4TH METATARSAL HEAD                                                          Final Diagnosis --     1.  Bone, fourth metatarsal head, resection:   A.  Necrotizing acute soft tissue inflammation and acute osteomyelitis.   B.  Viable noninflamed bony margin of excision.       Gross Description --     1. Foot, Right.  Received in formalin, labeled with the patient's barcode and \"fourth metatarsal head\", is a 2.5 x 2.2 x 1.5 cm recognizable metatarsal head.  There is a moderate amount of surrounding soft tissue which is white-gray and dull.  Sectioning of the bone reveals unremarkable surfaces.  Representative sections are submitted as follows:  A-surrounding soft tissue  B-margin en face, true margin cut first following " "decalcification  C-cross-section through metatarsal head, following decalcification      LMP/uso/mec       Microscopic Description --     Unless \"gross only\" is specified, the final diagnosis for each specimen is based on microscopic examination of tissue.      POC Glucose Once [[id number]]  (Abnormal) Collected: 03/11/25 1102    Specimen: Blood Updated: 03/11/25 1104     Glucose 161 mg/dL           Imaging Results (Last 24 Hours)       Procedure Component Value Units Date/Time    XR Chest 1 View [[id number]] Resulted: 03/12/25 0624     Updated: 03/12/25 0625    XR Chest Post CVA Port [[id number]] Collected: 03/11/25 1737     Updated: 03/11/25 1746    Narrative:      XR CHEST POST CVA PORT-     CLINICAL HISTORY:  s/p tunneled catheter placement, eval for  pneumothorax; T79.6XXA-Traumatic ischemia of muscle, initial encounter;  N17.9-Acute kidney failure, unspecified; N18.9-Chronic kidney disease,  unspecified; A41.9-Sepsis, unspecified organism; J18.9-Pneumonia,  unspecified organism; R73.9-Hyperglycemia, unspecified; D64.9-Anemia,  unspecified; S42.032A-Displaced fracture of lateral end of left  clavicle, init     COMPARISON: 3/9/2025 a single portable view of the chest was obtained.  The study is hampered by the patient's body habitus. A dual-lumen  central line is in place with the tip at the junction of the superior  vena cava and right atrium. There is prominence of the pulmonary  interstitium and lesser extent vasculature similar in appearance as  compared to 3/9/2025. There is no evidence of consolidation or effusion.  There is a lucency appreciated at the right lung apex suspicious for a  very small pneumothorax.     The above information was called to the patient's nurse. The patient's  nurse is to relay the information to the clinical service.           This report was finalized on 3/11/2025 5:43 PM by Dr. Bishop Tran M.D  on Workstation: [id number]       FL C Arm During Surgery [[id number]] Resulted: " 03/11/25 1732     Updated: 03/11/25 1732    Narrative:      This procedure was auto-finalized with no dictation required.          ECG/EMG Results (last 24 hours)       Procedure Component Value Units Date/Time    Telemetry Scan [[id number]] Resulted: 03/03/25     Updated: 03/11/25 1223    Telemetry Scan [[id number]] Resulted: 03/03/25     Updated: 03/11/25 1604             Operative/Procedure Notes (last 24 hours)        Jeaneth Bonds MD at 03/11/25 1623          Date of Admission:  3/3/2025  03/11/25  Jeaneth Bonds MD  [facility]    Preoperative Diagnosis: Acute renal failure    Postoperative Diagnosis: same as above    Procedure Performed:     1)  Tunneled catheter insertion into the Right internal jugular vein  2)  Ultrasound guided vascular access  3)  Radiologic supervision of catheter tip placement        Surgeon: Jeaneth Bonds MD    Assistant:  None    Anesthesia: General    Estimated Blood Loss:  Minimal    Findings:     PatentRight internal jugular vein vein on ultrasound.  Under real time ultrasound visualization needle access of the vein was performed.  Ultrasound image of access printed and stored in the medical record.    Both ports draw and flush without resistance    Successful  placement of a 23 cm Palindrome catheter in the Right internal jugular vein.    Implants:    Nothing was implanted during the procedure    Specimen: none    Complications: none    Dispo: to PACU    Indication for procedure: 79 y.o. male with CKD stage 3 at baseline with new acute kidney injury.  Nephrology has recommended initiation of dialysis and has requested tunneled catheter placement.      Description of procedure:     The patient was taken to the operating room and positioned supine on the operating table. Vein was interrogated with an ultrasound which appeared to be adequate for catheter placement. Surgical sites were prepped and draped in the usual sterile fashion. A full surgical timeout was  performed with all OR staff present and in agreement.  Appropriate perioperative antibiotics were administered.  The skin overlying the vein was infiltrated with local anesthetic. Under ultrasound guidance, the vein was accessed with a micropuncture needle and wire was placed under fluoroscopic guidance into the cava.  A micropuncture catheter then a floppy J guidewire were placed and the J wire was advanced into the inferior vena cava under fluoroscopy.  15 bladed scalpel was used to make a half centimeter skin neck at the neck puncture site and the vascular insertion site.  Local anesthetic was used to anesthetize the tunneling tract of the palindrome catheter.  Half centimeters skin neck was made at the planned exit site of the catheter.  Serial dilation was performed under fluoroscopic guidance to vascular access site with the supplied vascular dilators.  Peel-away sheath and dilator were placed under fluoroscopic guidance over the guidewire.  Palindrome catheter was tunneled from the exit site to the vascular insertion site.  Wire and dilator were removed from the peel-away sheath.  Catheter was placed down into the peel-away sheath into the superior vena cava.  Catheter was withdrawn until adequate tip placement was verified under fluoroscopic guidance.  Fluoroscopy of the apex of the catheter was performed to ensure no kinking.  Catheter was secured in place with nylon sutures.  The vascular access site was closed using Vicryl suture.  Sterile dressings were applied throughout.  All wires and sheaths were removed intact from the patient prior to the conclusion of the procedure.      Jeaneth Bonds MD  03/11/25    Active Hospital Problems    Diagnosis  POA    **Traumatic rhabdomyolysis [T79.6XXA]  Yes    MRSA (methicillin resistant Staphylococcus aureus) infection [A49.02]  Yes    Contusion of left knee [S80.02XA]  Yes    Acute osteomyelitis of right foot [M86.071]  Yes    Diabetic foot infection  [E11.628, L08.9]  Yes    ATN (acute tubular necrosis) [N17.0]  Yes    Closed displaced fracture of left clavicle [S42.002A]  Yes    Pneumonia due to infectious organism [J18.9]  Yes    Acute kidney injury [N17.9]  Unknown    Persistent atrial fibrillation [I48.19]  Yes    Chronic anticoagulation [Z79.01]  Not Applicable    Stage 3b chronic kidney disease [N18.32]  Yes    Chronic diastolic (congestive) heart failure [I50.32]  Yes    KILLIAN (acute kidney injury) [N17.9]  Yes    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       .     Electronically signed by Jeaneth Bonds MD at 25 [phone]          Physician Progress Notes (last 48 hours)        Jimenez Mchugh DPM at 25 [phone]          Please cancel patient's surgery this morning for debridement of the right foot.  Patient had some moderate amount of blood loss during his surgery 2 days ago and hiss hemoglobin is still 7.  Type and screen performed and also has a small pneumothorax.  I discussed this with Dr. Ashby the vascular surgeon as well as Dr. Wolff the anesthesiologist and at this time I will cancel surgery.  Will wait for him to be more hemodynamically stable and plan for surgery next 1 to 2 days.  Will update team with time    Electronically signed by Jimenez Mchugh DPM at [phone]       Yovanny Kowalski MD at 25 0697              Name: Rock Maciel . ADMIT: 3/3/2025   : 1945  PCP: Denise Dickson APRN    MRN: [id number] LOS: 8 days   AGE/SEX: 79 y.o. male  [facility]     Subjective   Subjective     I saw him in the PACU. His RN informed me  that there was a small apical pneumothorax following the procedure. He was asymptomatic      Objective   Objective   Vital Signs  Temp:  [97.2 °F (36.2 °C)-97.7 °F (36.5 °C)] 97.3 °F (36.3 °C)  Heart Rate:  [57-69] 65  Resp:  [16-21] 20  BP: (133-179)/(46-71) 157/52  SpO2:  [79 %-99 %] 92 %  on  Flow (L/min) (Oxygen Therapy):  [2] 2;   Device (Oxygen Therapy): nasal cannula  Body mass index is 37.03 kg/m².  Physical Exam  Constitutional:       General: He is not in acute distress.     Appearance: He is obese. He is not toxic-appearing.   Cardiovascular:      Rate and Rhythm: Normal rate and regular rhythm.   Pulmonary:      Effort: Pulmonary effort is normal.      Breath sounds: Normal breath sounds.   Abdominal:      General: Bowel sounds are normal.      Palpations: Abdomen is soft.   Musculoskeletal:         General: No tenderness.      Right lower leg: No edema.      Left lower leg: No edema.      Comments: Left transmetatarsal amputation  Right foot bandaged   Skin:     Comments: Right chest tunneled dialysis catheter   Neurological:      Mental Status: He is alert.   Psychiatric:         Mood and Affect: Mood normal.         Behavior: Behavior normal.         Results Review     I reviewed the patient's new clinical results.  Results from last 7 days   Lab Units 03/11/25 0518 03/09/25  0714 03/08/25  0403 03/07/25  0449   WBC 10*3/mm3 9.23 9.95 11.08* 13.94*   HEMOGLOBIN g/dL 7.0* 7.6* 7.3* 7.3*   PLATELETS 10*3/mm3 263 261 238 215     Results from last 7 days   Lab Units 03/11/25  0518 03/10/25  0529 03/09/25  0714 03/08/25  0403   SODIUM mmol/L 136 134* 138 138   POTASSIUM mmol/L 4.9 4.5 4.3 4.1   CHLORIDE mmol/L 100 100 101 103   CO2 mmol/L 21.0* 19.8* 23.0 22.5   BUN mg/dL 107* 91* 89* 76*   CREATININE mg/dL 6.67* 5.85* 5.05* 4.22*   GLUCOSE mg/dL 146* 131* 131* 119*   Estimated Creatinine Clearance: 12.3 mL/min (A) (by C-G formula based on SCr of 6.67 mg/dL (H)).  Results from last 7 days   Lab Units  "03/06/25  0508 03/05/25  0604   ALBUMIN g/dL 2.5* 2.4*   BILIRUBIN mg/dL 0.9 0.6   ALK PHOS U/L 220* 163*   AST (SGOT) U/L 63* 82*   ALT (SGPT) U/L 61* 53*     Results from last 7 days   Lab Units 03/11/25  0518 03/10/25  0529 03/09/25  0714 03/08/25  0403 03/07/25  0449 03/06/25  0508 03/05/25  0604   CALCIUM mg/dL 8.0* 7.8* 8.1* 8.1*   < > 8.0* 7.8*   ALBUMIN g/dL  --   --   --   --   --  2.5* 2.4*    < > = values in this interval not displayed.     No results found for: \"COVID19\"  Glucose   Date/Time Value Ref Range Status   03/11/2025 1654 155 (H) 70 - 130 mg/dL Final   03/11/2025 1102 161 (H) 70 - 130 mg/dL Final   03/11/2025 0647 158 (H) 70 - 130 mg/dL Final   03/10/2025 2126 215 (H) 70 - 130 mg/dL Final   03/10/2025 1627 178 (H) 70 - 130 mg/dL Final   03/10/2025 0959 156 (H) 70 - 130 mg/dL Final   03/10/2025 0738 144 (H) 70 - 130 mg/dL Final       XR Chest Post CVA Port  XR CHEST POST CVA PORT-     CLINICAL HISTORY:  s/p tunneled catheter placement, eval for  pneumothorax; T79.6XXA-Traumatic ischemia of muscle, initial encounter;  N17.9-Acute kidney failure, unspecified; N18.9-Chronic kidney disease,  unspecified; A41.9-Sepsis, unspecified organism; J18.9-Pneumonia,  unspecified organism; R73.9-Hyperglycemia, unspecified; D64.9-Anemia,  unspecified; S42.032A-Displaced fracture of lateral end of left  clavicle, init     COMPARISON: 3/9/2025 a single portable view of the chest was obtained.  The study is hampered by the patient's body habitus. A dual-lumen  central line is in place with the tip at the junction of the superior  vena cava and right atrium. There is prominence of the pulmonary  interstitium and lesser extent vasculature similar in appearance as  compared to 3/9/2025. There is no evidence of consolidation or effusion.  There is a lucency appreciated at the right lung apex suspicious for a  very small pneumothorax.     The above information was called to the patient's nurse. The patient's  nurse is " to relay the information to the clinical service.           This report was finalized on 3/11/2025 5:43 PM by Dr. Bishop Tran M.D  on Workstation: [id number] Arm During Surgery  This procedure was auto-finalized with no dictation required.    Scheduled Medications  amiodarone, 200 mg, Oral, Daily  [Held by provider] apixaban, 2.5 mg, Oral, BID  vitamin C, 250 mg, Oral, Daily  atorvastatin, 20 mg, Oral, Nightly  bumetanide, 2 mg, Oral, BID Diuretics  cholecalciferol, 1,000 Units, Oral, Daily  [Held by provider] clopidogrel, 75 mg, Oral, Daily  DAPTOmycin, 8 mg/kg (Adjusted), Intravenous, Q48H  [START ON 3/12/2025] epoetin vince/vince-epbx, 10,000 Units, Intravenous, Once per day on Monday Wednesday Friday  hydrALAZINE, 10 mg, Oral, TID  insulin glargine, 5 Units, Subcutaneous, Nightly  insulin lispro, 2-9 Units, Subcutaneous, 4x Daily AC & at Bedtime  insulin lispro, 6 Units, Subcutaneous, TID With Meals  linagliptin, 5 mg, Oral, Daily  melatonin, 2.5 mg, Oral, Nightly  Menthol-Zinc Oxide, 1 Application, Topical, Q12H  metoprolol succinate XL, 25 mg, Oral, Daily  pantoprazole, 40 mg, Oral, Q AM  piperacillin-tazobactam, 4.5 g, Intravenous, Q12H  sodium chloride, 3 mL, Intravenous, Q12H  tamsulosin, 0.4 mg, Oral, Daily    Infusions  Pharmacy Consult - Pharmacy to dose,     Diet  No diet orders on file      Assessment/Plan     Active Hospital Problems    Diagnosis  POA    **Traumatic rhabdomyolysis [T79.6XXA]  Yes    MRSA (methicillin resistant Staphylococcus aureus) infection [A49.02]  Yes    Contusion of left knee [S80.02XA]  Yes    Acute osteomyelitis of right foot [M86.071]  Yes    Diabetic foot infection [E11.628, L08.9]  Yes    ATN (acute tubular necrosis) [N17.0]  Yes    Closed displaced fracture of left clavicle [S42.002A]  Yes    Pneumonia due to infectious organism [J18.9]  Yes    Acute kidney injury [N17.9]  Unknown    Persistent atrial fibrillation [I48.19]  Yes    Chronic anticoagulation  [Z79.01]  Not Applicable    Stage 3b chronic kidney disease [N18.32]  Yes    Chronic diastolic (congestive) heart failure [I50.32]  Yes    KILLIAN (acute kidney injury) [N17.9]  Yes    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       79 y.o. male admitted with Traumatic rhabdomyolysis.    79 year old man who presented following a mechanical fall leading to rhabdomyolysis, left knee contusion with left knee effusion and meniscus tear, and a clavicular fracture. He was also found to have KILLIAN on CKD 4, pneumonia, osteomyelitis and abscess/cellulitis of the right foot.    Traumatic rhabdomyolysis-resolved with IVF.   KILLIAN on CKD 4-prerenal due to ATN. Now s/p tunneled dialysis cathter placement today with plans to initiate HD tomorrow  Post procedural right apical pneumothorax-tiny. Follow up chest xrays are planned for the AM  Pneumonia-on IV zosyn  Osteomyelitis and abscess of the right foot-s/p I&D and partial 4th metatarsal excision on 3/10/25 by Dr. Mchugh. There are plans to return to the OR tomorrow for debridement of the surgical site. Currently on daptomycin per ID.  Acute urinary retention/BPH-abdi catheter placed. Urology evaluated and recommended flomax and a voiding trial on the morning of discharge.  Left clavicular fracture-orthopedic surgery evaluated and recommended a sling as tolerated, ice, pain control, and to avoid forward flexion for now  Left knee contusion with left knee effusion and meniscus tear-orthopedic surgery recommends a knee brace/immobilizer for at least 3 weeks and then begin range of motion exercises with  PT  Anemia of chronic disease/CKD-hgb is down to 7 today. Transfuse if <7 with dialysis  Chronic afib-amiodarone. Eliquis is held acutely for procedures  Chronic diastolic heart failure-bumex  Coronary artery disease-plavix is held acutely for procedures. Statin/bb  Type 2 diabetes-A1c is 6. Glucose is at goal acutely on current regimen  Essential hypertension-adequate control acutely  Hyperlipidemia-statin  SCDs for DVT prophylaxis.  Full code.  Discussed with patient and nursing staff.  Anticipate discharge to SNU facility timing yet to be determined.      Yovanny Kowalski MD  [facility]  25  18:58 EDT             Electronically signed by Yovanny Kowalski MD at 25 1920       Jeremi Ashby II, MD at 25 180            Name: Rock Maciel Jr. ADMIT: 3/3/2025   : 1945  PCP: Denise Dickson APRN    MRN: [id number] LOS: 8 days   AGE/SEX: 79 y.o. male  ROOM:  [facility] OR/MAIN OR     Vascular Surgery Progress Note    I was paged by PACU RN regarding patient's chest x-ray.  Very small apical pneumothorax seen.  Patient doing well otherwise with no dyspnea per my discussion with nurse.  I asked nurse to keep patient on oxygen and she confirmed patient will be going on a monitored floor on telemetry.  I've ordered a formal 2v CXR for the morning and informed Dr. Bonds.       Jeremi Ashby II, MD  25  18:01 EDT  Office Number [phone]    [facility] Vascular Surgery      Electronically signed by Jeremi Ashby II, MD at 25 1807       Jimenez Mchugh DPM at 25 3611              Podiatry Progress Note      Patient: Rock Maciel JrJc Admit Date: 2025    Age: 79 y.o.   PCP: Denise Dickson APRN    MRN: [id number]  Room: [facility] OR/MAIN OR        Subjective     Chief Complaint     Chief Complaint   Patient presents with    Fall        HPI     Postop day #1 from partial metatarsal excision incision and drainage.  Patient seen today  in PACU.  Doing well no complaints of pain notes foot.    Past Medical History     Past Medical History:   Diagnosis Date    Allergic rhinitis     Bronchitis     Coronary artery disease     Diabetes mellitus 2001    DM (diabetes mellitus)     Encounter for annual health examination 05/06/2014    Annual Health Assessment    Gout     Hiatal hernia     History of MRSA infection     RIGHT FOOT 2010    Hyperlipidemia     Hypertension     Murmur     Pneumothorax on right     Sleep apnea     pt wears CPAP at night    Wellness examination 06/24/2015    Annual Wellness Visit        Past Surgical History:   Procedure Laterality Date    ARTERY SURGERY Bilateral     carotid    BONE EXCISION LEG Right 3/10/2025    Procedure: BONE EXCISION LOWER EXTERMITY, RIGHT;  Surgeon: Jimenez Mchugh DPM;  Location: [facility] OR;  Service: Podiatry;  Laterality: Right;    CARDIAC CATHETERIZATION N/A 7/20/2018    Procedure: Left Heart Cath;  Surgeon: Jo Toney MD;  Location: [facility] CATH INVASIVE LOCATION;  Service: Cardiovascular    CARDIAC CATHETERIZATION N/A 7/20/2018    Procedure: Coronary angiography;  Surgeon: Jo Toney MD;  Location: [facility] CATH INVASIVE LOCATION;  Service: Cardiovascular    CAROTID ENDARTERECTOMY Bilateral     COLONOSCOPY  2008    CORONARY ARTERY BYPASS GRAFT WITH AORTIC VALVE REPAIR/REPLACEMENT N/A 7/23/2018    Procedure: INTRAOPERATIVE ALEX, MIDLINE STERNOTOMY, CORONARY ARTERY BYPASS GRAFTING X 3 USING ENDOSCOPICALLY HARVESTED LEFT GREATER SAPHENOUS VEIN,  AORTIC VALVE REPLACEMENT USING 25MM LOPEZ II ULTRA PORCINE VALVE, PRP;  Surgeon: Phong Posey MD;  Location: [facility] OR;  Service: Cardiothoracic    FOOT SURGERY Right 2010    5th digit removal    FOOT SURGERY Left 2011    1 digit removed    INCISION AND DRAINAGE LEG Right 3/28/2020    Procedure: DEBRIDMENT OF RIGHT CALF;  Surgeon: Ross Pineda MD;  Location: [facility] MAIN OR;  Service: Vascular;  Laterality: Right;     INCISION AND DRAINAGE LEG Right 3/10/2025    Procedure: INCISION AND DRAINAGE LOWER EXTREMITY;  Surgeon: Jimenez Mchugh DPM;  Location: [facility] MAIN OR;  Service: Podiatry;  Laterality: Right;    INGUINAL HERNIA REPAIR Left 11/29/2024    Procedure: INGUINAL HERNIA REPAIR LAPAROSCOPIC WITH DAVINCI ROBOT;  Surgeon: Phong Lazo MD;  Location: [facility] MAIN OR;  Service: Robotics - DaVinci;  Laterality: Left;    QUADRICEPS TENDON REPAIR Left 5/14/2019    Procedure: Left QUADRICEPS TENDON REPAIR;  Surgeon: Camilo Hunter MD;  Location: [facility] MAIN OR;  Service: Orthopedics    THORACENTESIS Right 11/21/2016    THORACOSCOPY Right 5/8/2017    Procedure: BRONCHOSCOPY, RIGHT VAT,  TOTAL DECORTICATION RIGHT LUNG, PLEURAL BX, PLACEMENT SUBPLEURAL PAIN CAATHETERS X2;  Surgeon: Donald Orlando III, MD;  Location: [facility] MAIN OR;  Service:     TONSILECTOMY, ADENOIDECTOMY, BILATERAL MYRINGOTOMY AND TUBES          Allergies   Allergen Reactions    Ceclor [Cefaclor] Rash     Rash in his 50s; he tolerated piperacillin-tazobactam in March 2020        Social History     Tobacco Use   Smoking Status Never    Passive exposure: Never   Smokeless Tobacco Never        Objective   Physical Exam    Vitals:    03/11/25 1730   BP: 174/64   Pulse: 57   Resp: 20   Temp:    SpO2: 95%        Dressing clean dry and intact to the right lower extremity    Labs     Lab Results   Component Value Date    HGBA1C 6.00 (H) 03/03/2025    POCGLU 155 (H) 03/11/2025    SEDRATE 95 (H) 03/26/2020        CBC:      Lab 03/11/25  0518 03/09/25  0714 03/08/25  0403 03/07/25  0449 03/06/25  0508   WBC 9.23 9.95 11.08* 13.94* 15.51*   HEMOGLOBIN 7.0* 7.6* 7.3* 7.3* 7.0*   HEMATOCRIT 22.5* 24.7* 22.9* 22.3* 21.6*   PLATELETS 263 261 238 215 193   MCV 92.6 93.6 90.5 90.3 91.1          Results for orders placed or performed during the hospital encounter of 03/03/25   Blood Culture - Blood, Arm, Left    Specimen: Arm, Left; Blood   Result Value Ref Range     Blood Culture No growth at 5 days         XR Chest Post CVA Port  XR CHEST POST CVA PORT-     CLINICAL HISTORY:  s/p tunneled catheter placement, eval for  pneumothorax; T79.6XXA-Traumatic ischemia of muscle, initial encounter;  N17.9-Acute kidney failure, unspecified; N18.9-Chronic kidney disease,  unspecified; A41.9-Sepsis, unspecified organism; J18.9-Pneumonia,  unspecified organism; R73.9-Hyperglycemia, unspecified; D64.9-Anemia,  unspecified; S42.032A-Displaced fracture of lateral end of left  clavicle, init     COMPARISON: 3/9/2025 a single portable view of the chest was obtained.  The study is hampered by the patient's body habitus. A dual-lumen  central line is in place with the tip at the junction of the superior  vena cava and right atrium. There is prominence of the pulmonary  interstitium and lesser extent vasculature similar in appearance as  compared to 3/9/2025. There is no evidence of consolidation or effusion.  There is a lucency appreciated at the right lung apex suspicious for a  very small pneumothorax.     The above information was called to the patient's nurse. The patient's  nurse is to relay the information to the clinical service.           This report was finalized on 3/11/2025 5:43 PM by Dr. Bishop Tran M.D  on Workstation: [id number]     FL C Arm During Surgery  This procedure was auto-finalized with no dictation required.       Assessment/Plan     79-year-old male postop day #1 from incision and drainage and partial fourth metatarsal excision    -Patient examined evaluated by myself  - Dressing changes per nursing staff  - Plan to return to the operating room tomorrow morning for debridement of surgical site.  Discussed with patient I likely will not be able to close this area and he will need long term wound care likely including wound VAC.  Will have definitive plans tomorrow       Jimenez Mchugh DPM  Office: [phone]     Electronically signed by Jimenez Mchugh DPM at  25 1800       Nicolasa Denny MD at 25 1742            Infectious Diseases Progress Note    Nicolasa Denny MD     [facility]  Los: 8 days  Patient Identification:  Name: Rock Maciel Jr.  Age: 79 y.o.  Sex: male  :  1945  MRN: [id number]         Primary Care Physician: Denise Dickson, ZEINA        Subjective: Had dialysis catheter placed with plans for dialysis on 3/12/2025 for worsening renal function.  Interval History: See consultation note.    Objective:    Scheduled Meds:amiodarone, 200 mg, Oral, Daily  [Held by provider] apixaban, 2.5 mg, Oral, BID  [Transfer Hold] vitamin C, 250 mg, Oral, Daily  [Transfer Hold] atorvastatin, 20 mg, Oral, Nightly  [Transfer Hold] bumetanide, 2 mg, Oral, BID Diuretics  [Transfer Hold] cholecalciferol, 1,000 Units, Oral, Daily  [Held by provider] clopidogrel, 75 mg, Oral, Daily  DAPTOmycin, 8 mg/kg (Adjusted), Intravenous, Q48H  [Transfer Hold] epoetin vince/vince-epbx, 10,000 Units, Intravenous, Once per day on   hydrALAZINE, 10 mg, Oral, TID  [Transfer Hold] insulin glargine, 5 Units, Subcutaneous, Nightly  [Transfer Hold] insulin lispro, 2-9 Units, Subcutaneous, 4x Daily AC & at Bedtime  [Transfer Hold] insulin lispro, 6 Units, Subcutaneous, TID With Meals  [Transfer Hold] linagliptin, 5 mg, Oral, Daily  [Transfer Hold] melatonin, 2.5 mg, Oral, Nightly  [Transfer Hold] Menthol-Zinc Oxide, 1 Application, Topical, Q12H  metoprolol succinate XL, 25 mg, Oral, Daily  [Transfer Hold] pantoprazole, 40 mg, Oral, Q AM  piperacillin-tazobactam, 4.5 g, Intravenous, Q12H  [Transfer Hold] sodium chloride, 3 mL, Intravenous, Q12H  [Transfer Hold] tamsulosin, 0.4 mg, Oral, Daily      Continuous Infusions:lactated ringers, 9 mL/hr  Pharmacy Consult - Pharmacy to dose,         Vital signs in last 24 hours:  Temp:  [97.2 °F (36.2 °C)-97.7 °F (36.5 °C)] 97.4 °F (36.3 °C)  Heart Rate:  [57-67] 57  Resp:  [16-21] 20  BP: (133-176)/(48-71)  "174/64    Intake/Output:    Intake/Output Summary (Last 24 hours) at 3/11/2025 1742  Last data filed at 3/11/2025 1649  Gross per 24 hour   Intake 200 ml   Output 200 ml   Net 0 ml       Exam:  /64   Pulse 57   Temp 97.4 °F (36.3 °C) (Oral)   Resp 20   Ht 185.4 cm (73\")   Wt 127 kg (280 lb 10.3 oz)   SpO2 95%   BMI 37.03 kg/m²   Patient is examined using the personal protective equipment as per guidelines from infection control for this particular patient as enacted.  Hand washing was performed before and after patient interaction.  General Appearance:  Lethargic but interactive                          Head:    Normocephalic, without obvious abnormality, atraumatic                           Eyes:    PERRL, pale conjunctiva                         Throat:   Lips, tongue, gums normal; oral mucosa pink and moist                           Neck:   Supple, symmetrical, trachea midline, no JVD                         Lungs:    Clear to auscultation bilaterally, respirations unlabored                 Chest Wall:    No tenderness or deformity left clavicular tenderness noted                          Heart:  S1-S2 irregular                  Abdomen:   Soft nontender                 Extremities: Right foot dressed after incision and drainage of multiple compartments and partial resection of the fourth metatarsal.                  Neurologic: Alert and oriented x 3       Data Review:    I reviewed the patient's new clinical results.  Results from last 7 days   Lab Units 03/11/25 0518 03/09/25 0714 03/08/25  0403 03/07/25  0449 03/06/25  0508 03/05/25  0604   WBC 10*3/mm3 9.23 9.95 11.08* 13.94* 15.51* 14.98*   HEMOGLOBIN g/dL 7.0* 7.6* 7.3* 7.3* 7.0* 6.2*   PLATELETS 10*3/mm3 263 261 238 215 193 182     Results from last 7 days   Lab Units 03/11/25  0518 03/10/25  0529 03/09/25  0714 03/08/25  0403 03/07/25  0449 03/06/25  0508 03/05/25  0604   SODIUM mmol/L 136 134* 138 138 138 135* 138   POTASSIUM mmol/L 4.9 " 4.5 4.3 4.1 3.9 4.1 3.9   CHLORIDE mmol/L 100 100 101 103 102 101 101   CO2 mmol/L 21.0* 19.8* 23.0 22.5 23.0 24.3 24.9   BUN mg/dL 107* 91* 89* 76* 69* 67* 71*   CREATININE mg/dL 6.67* 5.85* 5.05* 4.22* 3.97* 3.85* 3.91*   CALCIUM mg/dL 8.0* 7.8* 8.1* 8.1* 8.0* 8.0* 7.8*   GLUCOSE mg/dL 146* 131* 131* 119* 149* 147* 126*     Microbiology Results (last 10 days)       Procedure Component Value - Date/Time    Wound Culture - Wound, Foot, Right [[id number]] Collected: 03/10/25 0909    Lab Status: Preliminary result Specimen: Wound from Foot, Right Updated: 03/11/25 0914     Wound Culture Culture in progress     Gram Stain Rare (1+) Gram positive cocci      Rare (1+) WBCs seen    Wound Culture - Wound, Foot, Right [[id number]]  (Abnormal)  (Susceptibility) Collected: 03/06/25 1620    Lab Status: Edited Result - FINAL Specimen: Wound from Foot, Right Updated: 03/11/25 0837     Wound Culture Moderate growth (3+) Staphylococcus aureus, MRSA     Comment:   Methicillin resistant Staphylococcus aureus, Patient may be an isolation risk.         Scant growth (1+) Candida albicans      Light growth (2+) Normal Skin Ashley     Gram Stain Moderate (3+) WBCs per low power field      Few (2+) Gram positive cocci in pairs and clusters      Few (2+) Gram positive bacilli    Susceptibility        Staphylococcus aureus, MRSA      ABBIE      Clindamycin Susceptible      Daptomycin Susceptible (C)  [1]       Erythromycin Resistant      Oxacillin Resistant      Rifampin Susceptible      Tetracycline Susceptible      Trimethoprim + Sulfamethoxazole Resistant      Vancomycin Susceptible                   [1]  Appended report. These results have been appended to a previously final verified report.                Susceptibility Comments       Staphylococcus aureus, MRSA    Daptomycin released per Eduardo in Pharmacy               Legionella Antigen, Urine - Urine, Urine, Clean Catch [[id number]]  (Normal) Collected: 03/06/25 1143    Lab Status: Final  result Specimen: Urine, Clean Catch Updated: 03/06/25 1252     LEGIONELLA ANTIGEN, URINE Negative    S. Pneumo Ag Urine or CSF - Urine, Urine, Clean Catch [[id number]]  (Normal) Collected: 03/06/25 1143    Lab Status: Final result Specimen: Urine, Clean Catch Updated: 03/06/25 1252     Strep Pneumo Ag Negative    MRSA Screen, PCR (Inpatient) - Swab, Nares [[id number]]  (Normal) Collected: 03/03/25 2220    Lab Status: Final result Specimen: Swab from Nares Updated: 03/04/25 0013     MRSA PCR No MRSA Detected    Narrative:      The negative predictive value of this diagnostic test is high and should only be used to consider de-escalating anti-MRSA therapy. A positive result may indicate colonization with MRSA and must be correlated clinically.    Blood Culture - Blood, Arm, Left [[id number]]  (Normal) Collected: 03/03/25 1747    Lab Status: Final result Specimen: Blood from Arm, Left Updated: 03/08/25 1815     Blood Culture No growth at 5 days    Blood Culture - Blood, Hand, Right [[id number]]  (Normal) Collected: 03/03/25 1745    Lab Status: Final result Specimen: Blood from Hand, Right Updated: 03/08/25 1815     Blood Culture No growth at 5 days              Assessment:    Traumatic rhabdomyolysis    Arteriosclerosis of coronary artery    Essential hypertension    Type 2 diabetes mellitus with circulatory disorder, without long-term current use of insulin    Hyperlipidemia    Charcot-Davida-Tooth disease-like deformity of foot    Fall    S/P CABG x 2    KILLIAN (acute kidney injury)    Chronic diastolic (congestive) heart failure    Stage 3b chronic kidney disease    Chronic anticoagulation    Persistent atrial fibrillation    Closed displaced fracture of left clavicle    Pneumonia due to infectious organism    ATN (acute tubular necrosis)    Contusion of left knee    Acute osteomyelitis of right foot    Diabetic foot infection    MRSA (methicillin resistant Staphylococcus aureus) infection    Acute kidney injury  1-probable  systemic illness due to  2-evolving ongoing infection with abscess of the right foot resulting in generalized weakness and  3-subsequent fall and fracture of left clavicle and left knee discomfort  4-diabetes mellitus  5-history of gout  6-sleep apnea  7-obesity  8-hypertension  9-severe anemia  10-acute on chronic renal failure - ESRD to start dialysis -3/12/2025  11-prior multiple diabetic foot infections and surgeries.  12-status post right IJ tunneled catheter with small post op pneumothorax     Recommendations/Discussions:  See my discussion and recommendations on 3/7/2025.  Continue with Zosyn and  daptomycin, avoid concomitant use of statin while he is taking daptomycin, asked microbiology for sensitivity of the Staph aureus/MRSA for daptomycin and monitor mild toxicity with periodic CPK levels-given the Gram stain and if there is no evidence of resistant pathogen then antibiotic therapy can be de-escalated to Unasyn.  Follow-up on operative culture results and depending upon operative findings and final surgery and subsequent concern for residual osteomyelitis final antibiotic regimen in terms of composition and duration would be recommended.  Nicolasa Denny MD  3/11/2025  17:42 EDT    Parts of this note may be an electronic transcription/translation of spoken language to printed text using the Dragon dictation system.      Electronically signed by Nicolasa Denny MD at 25 7086       Apurva Smith APRN at 25 9968          [facility]  Cardiology Progress Note    Patient Identification:  Name: Rock Maciel Jr.  Age: 79 y.o.  Sex: male  :  1945  MRN: [id number]                 Follow Up / Chief Complaint:  follow up preop clearance    Interval History: Resting in bed. No cp or shob. 3/10/25 surgery for Incision and drainage, multiple compartments, right foot partial fourth metatarsal excision, right foot by Dr. Mchugh.          Objective:    Past Medical History:  Past  Medical History:   Diagnosis Date    Allergic rhinitis     Bronchitis     Coronary artery disease     Diabetes mellitus 2001    DM (diabetes mellitus)     Encounter for annual health examination 05/06/2014    Annual Health Assessment    Gout     Hiatal hernia     History of MRSA infection     RIGHT FOOT 2010    Hyperlipidemia     Hypertension     Murmur     Pneumothorax on right     Sleep apnea     pt wears CPAP at night    Wellness examination 06/24/2015    Annual Wellness Visit     Past Surgical History:  Past Surgical History:   Procedure Laterality Date    ARTERY SURGERY Bilateral     carotid    BONE EXCISION LEG Right 3/10/2025    Procedure: BONE EXCISION LOWER EXTERMITY, RIGHT;  Surgeon: Jimenez Mchugh DPM;  Location: [facility] OR;  Service: Podiatry;  Laterality: Right;    CARDIAC CATHETERIZATION N/A 7/20/2018    Procedure: Left Heart Cath;  Surgeon: Jo Toney MD;  [facility];  Service: Cardiovascular    CARDIAC CATHETERIZATION N/A 7/20/2018    Procedure: Coronary angiography;  Surgeon: Jo Toney MD;  Location: [facility] CATH INVASIVE LOCATION;  Service: Cardiovascular    CAROTID ENDARTERECTOMY Bilateral     COLONOSCOPY  2008    CORONARY ARTERY BYPASS GRAFT WITH AORTIC VALVE REPAIR/REPLACEMENT N/A 7/23/2018    Procedure: INTRAOPERATIVE ALEX, MIDLINE STERNOTOMY, CORONARY ARTERY BYPASS GRAFTING X 3 USING ENDOSCOPICALLY HARVESTED LEFT GREATER SAPHENOUS VEIN,  AORTIC VALVE REPLACEMENT USING 25MM LOPEZ II ULTRA PORCINE VALVE, PRP;  Surgeon: Phong Posey MD;  Location: [facility];  Service: Cardiothoracic    FOOT SURGERY Right 2010    5th digit removal    FOOT SURGERY Left 2011    1 digit removed    INCISION AND DRAINAGE LEG Right 3/28/2020    Procedure: DEBRIDMENT OF RIGHT CALF;  Surgeon: Ross Pineda MD;  Location: [facility];  Service: Vascular;  Laterality: Right;    INCISION AND DRAINAGE LEG Right 3/10/2025    Procedure: INCISION  AND DRAINAGE LOWER EXTREMITY;  Surgeon: Jimenez Mchugh DPM;  Location: [facility] MAIN OR;  Service: Podiatry;  Laterality: Right;    INGUINAL HERNIA REPAIR Left 11/29/2024    Procedure: INGUINAL HERNIA REPAIR LAPAROSCOPIC WITH DAVINCI ROBOT;  Surgeon: Phong Lazo MD;  Location: [facility] MAIN OR;  Service: Robotics - DaVinci;  Laterality: Left;    QUADRICEPS TENDON REPAIR Left 5/14/2019    Procedure: Left QUADRICEPS TENDON REPAIR;  Surgeon: Camilo Hunter MD;  Location: [facility] MAIN OR;  Service: Orthopedics    THORACENTESIS Right 11/21/2016    THORACOSCOPY Right 5/8/2017    Procedure: BRONCHOSCOPY, RIGHT VAT,  TOTAL DECORTICATION RIGHT LUNG, PLEURAL BX, PLACEMENT SUBPLEURAL PAIN CAATHETERS X2;  Surgeon: Donald Orlando III, MD;  Location: [facility] OR;  Service:     TONSILECTOMY, ADENOIDECTOMY, BILATERAL MYRINGOTOMY AND TUBES          Social History:   Social History     Tobacco Use    Smoking status: Never     Passive exposure: Never    Smokeless tobacco: Never   Substance Use Topics    Alcohol use: Yes     Alcohol/week: 7.0 standard drinks of alcohol     Types: 7 Drinks containing 0.5 oz of alcohol per week     Comment: either one glass wine or one glass liquor per  day      Family History:  Family History   Problem Relation Age of Onset    Hypertension Father     Malig Hyperthermia Neg Hx           Allergies:  Allergies   Allergen Reactions    Ceclor [Cefaclor] Rash     Rash in his 50s; he tolerated piperacillin-tazobactam in March 2020     Scheduled Meds:  amiodarone, 200 mg, Daily  [Held by provider] apixaban, 2.5 mg, BID  vitamin C, 250 mg, Daily  atorvastatin, 20 mg, Nightly  bumetanide, 2 mg, BID Diuretics  cholecalciferol, 1,000 Units, Daily  [Held by provider] clopidogrel, 75 mg, Daily  DAPTOmycin, 8 mg/kg (Adjusted), Q48H  [START ON 3/12/2025] epoetin vince/vince-epbx, 10,000 Units, Once per day on Monday Wednesday Friday  hydrALAZINE, 10 mg, TID  insulin glargine, 5 Units, Nightly  insulin  lispro, 2-9 Units, 4x Daily AC & at Bedtime  insulin lispro, 6 Units, TID With Meals  linagliptin, 5 mg, Daily  melatonin, 2.5 mg, Nightly  Menthol-Zinc Oxide, 1 Application, Q12H  metoprolol succinate XL, 25 mg, Daily  pantoprazole, 40 mg, Q AM  piperacillin-tazobactam, 4.5 g, Q12H  sodium chloride, 3 mL, Q12H  tamsulosin, 0.4 mg, Daily            INTAKE AND OUTPUT:    Intake/Output Summary (Last 24 hours) at 3/11/2025 1420  Last data filed at 3/11/2025 0533  Gross per 24 hour   Intake --   Output 200 ml   Net -200 ml       Review of Systems:   GI: no n/v  Cardiac: no cp  Pulmonary: no shob    Constitutional:  Temp:  [97.2 °F (36.2 °C)-97.7 °F (36.5 °C)] 97.7 °F (36.5 °C)  Heart Rate:  [60-67] 62  Resp:  [16-18] 16  BP: (133-163)/(48-71) 163/59          Physical Exam:  General:  Alert, cooperative, appears in no acute distress  Respiratory: Clear to auscultation.  Normal respiratory effort and rate.  Cardiovascular: S1S2 Regular rate and rhythm. No murmur, rub or gallop.   Gastrointestinal: soft, non tender. Bowel sounds present.   Extremities: MARSHALL x4. No pretibial pitting edema. Adequate musculoskeletal strength.   Neuro: AAO x3 CN II-XII grossly intact      Cardiographics       ECG:     Echocardiogram:   10/2024  Interpretation Summary         Left ventricular ejection fraction appears to be 61 - 65%.    Left ventricular diastolic function was indeterminate.    The left atrial cavity is moderately dilated.    Left atrial volume is moderately increased.    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    Estimated right ventricular systolic pressure from tricuspid regurgitation is moderately elevated (45-55 mmHg).     Lab Review   Results from last 7 days   Lab Units 03/11/25  0518 03/10/25  0529 03/09/25  0714   CK TOTAL U/L 17* 20 39         Results from last 7 days   Lab Units 03/11/25  0518   SODIUM mmol/L 136   POTASSIUM mmol/L 4.9   BUN mg/dL 107*   CREATININE mg/dL 6.67*   CALCIUM mg/dL 8.0*  "    @LABRCNTIPbnp@  Results from last 7 days   Lab Units 25  0518 25  0714 25  0403   WBC 10*3/mm3 9.23 9.95 11.08*   HEMOGLOBIN g/dL 7.0* 7.6* 7.3*   HEMATOCRIT % 22.5* 24.7* 22.9*   PLATELETS 10*3/mm3 263 261 238               Assessment:    Preop clearance for foot surgery  Atrial fibrillation s/p cardioversion: SR. QTC stable. A/c on hold.   Chronic amiodarone therapy  Chronic anticoagulation  History of CAD    Plan:  Blood pressure and HR stable.   No change from our perspective.       )3/11/2025  ZEINA Fox      EMR Dragon/Transcription:   \"Dictated utilizing Dragon dictation\".       Electronically signed by Apurva Smith APRN at 25 1427       Tiesha Mccrary MD at 25 1141                FOLLOW UP NOTE      Name: Rock Maciel Jr. ADMIT: 3/3/2025   : 1945  PCP: Denise Dickson APRN    MRN: [id number] LOS: 8 days   AGE/SEX: 79 y.o. male  ROOM: [facility]     Date of Service: 3/11/2025                           CHIEF COMPLIANTS / REASON FOR FOLLOW UP                Subjective:    Resting comfortably in bed.  Oliguric, urine output only 200 mL.  No new complaints.     Review of Systems:     Negative except as above       OBJECTIVE                                                                        Exam:  /48 (BP Location: Left arm, Patient Position: Lying)   Pulse 61   Temp 97.3 °F (36.3 °C) (Oral)   Resp 18   Ht 185.4 cm (73\")   Wt 127 kg (280 lb 10.3 oz)   SpO2 (!) 79%   BMI 37.03 kg/m²   Intake/Output last 3 shifts:  I/O last 3 completed shifts:  In: 320 [I.V.:300; Other:20]  Out: 370 [Urine:370]  Intake/Output this shift:  No intake/output data recorded.    General Appearance: Chronically ill, pale appearing      Lungs:   Lungs largely clear, diminished at lung bases  Heart: RRR  Abdomen:  Soft, nontender  Extremities: Extremities wrapped, 1+ edema, knee effusion noted  Neurologic:   Alert and oriented  Johnson with brown " urine    Scheduled Meds:amiodarone, 200 mg, Oral, Daily  [Held by provider] apixaban, 2.5 mg, Oral, BID  vitamin C, 250 mg, Oral, Daily  atorvastatin, 20 mg, Oral, Nightly  bumetanide, 2 mg, Oral, BID Diuretics  cholecalciferol, 1,000 Units, Oral, Daily  [Held by provider] clopidogrel, 75 mg, Oral, Daily  DAPTOmycin, 8 mg/kg (Adjusted), Intravenous, Q48H  [START ON 3/12/2025] epoetin vince/vince-epbx, 10,000 Units, Intravenous, Once per day on Monday Wednesday Friday  hydrALAZINE, 10 mg, Oral, TID  insulin glargine, 5 Units, Subcutaneous, Nightly  insulin lispro, 2-9 Units, Subcutaneous, 4x Daily AC & at Bedtime  insulin lispro, 6 Units, Subcutaneous, TID With Meals  linagliptin, 5 mg, Oral, Daily  melatonin, 2.5 mg, Oral, Nightly  Menthol-Zinc Oxide, 1 Application, Topical, Q12H  metoprolol succinate XL, 25 mg, Oral, Daily  pantoprazole, 40 mg, Oral, Q AM  piperacillin-tazobactam, 4.5 g, Intravenous, Q12H  sodium chloride, 3 mL, Intravenous, Q12H  tamsulosin, 0.4 mg, Oral, Daily      Continuous Infusions:Pharmacy Consult - Pharmacy to dose,         PRN Meds:  acetaminophen **OR** acetaminophen **OR** acetaminophen    artificial tears    senna-docusate sodium **AND** polyethylene glycol **AND** bisacodyl **AND** bisacodyl    cadexomer iodine    dextrose    dextrose    famotidine    glucagon (human recombinant)    HYDROcodone-acetaminophen    nitroglycerin    ondansetron ODT **OR** ondansetron    Pharmacy Consult - Pharmacy to dose    sodium chloride    sodium chloride         Data Review:                                                                           Labs reviewed        Imaging:                                                                           Radiology reviewed           ASSESSMENT:                                                                                Traumatic rhabdomyolysis    Arteriosclerosis of coronary artery    Essential hypertension    Type 2 diabetes mellitus with circulatory  disorder, without long-term current use of insulin    Hyperlipidemia    Charcot-Davida-Tooth disease-like deformity of foot    Fall    S/P CABG x 2    KILLIAN (acute kidney injury)    Chronic diastolic (congestive) heart failure    Stage 3b chronic kidney disease    Chronic anticoagulation    Persistent atrial fibrillation    Closed displaced fracture of left clavicle    Pneumonia due to infectious organism    ATN (acute tubular necrosis)    Contusion of left knee    Acute osteomyelitis of right foot    Diabetic foot infection    MRSA (methicillin resistant Staphylococcus aureus) infection    Acute kidney injury         KILLIAN: likely ATN related to rhabdomyolysis, prerenal insult related to diuretics etc.  Needing RRT since 3/11/2025. Reason for GFR decline likely multifactorial, some ATN, ongoing osteomyelitis etc..  Lower extremity osteomyelitis/cellulitis  CKD stage III-IV: Baseline creatinine  1.8-2.6 mg/dL with baseline GFR around 25 mL/min  Acute urinary retention requiring Johnson catheter placement.  Clavicle fracture  A-fib with controlled rates.  History of HFpEF with pulmonary hypertension: Slightly volume expanded.  Severe anemia in CKD:          PLAN:                                                                            Appreciate vascular help with TDC placement.  First HD treatment planned for today: 2 hours/low flows/exponential sodium model with 1 L UF goal.  Plan additional HD tomorrow for further clearance.  Podiatry following for foot osteomyelitis.  Antibiotics dosed for  CrCl<15 mL/min.  Follow-up with ID.  Switch to 3 times a week EPO with HD.   Continue diuretics.    D/w RN     Tiesha Mccrary MD  [facility]  3/11/2025  11:48 EDT        Electronically signed by Tiesha Mccrary MD at 03/11/25 7809       John Mcgee MD at 03/10/25 1202          Orthopaedic Progress Note     Pain well controlled at left upper extremity. Denied pain at knees during initial evaluation. Now with  left knee pain and swelling. History of quad tendon repair but he is unsure when or where this occurred. MRI of the left knee with recurrent tearing of the tendon laterally not full thickness as tendon still intact medially.       CONSTITUTIONAL: No acute distress  LUNGS: Equal chest rise, no shortness of air  CARDIOVASCULAR: palpable peripheral pulses  LYMPH: no lymphadenopathy in the area examined  Musculoskeletal:   LUE  Skin intact with no tenting of the skin   arm resting in a position of comfort  AIN PIN ULNAR NERVE intact   Sensation is intact distally   Foot is wwp   Left knee   Moderate effusion   Midline incision well healed.   Able to contract quadricep  No obvious palpable defect   Difficult to determine continuity of the quad tendon   Too weak to do straight leg raise   Sensation is not intact to light touch distally   Foot with TMA is warm and appears perfused     Cellulitis present at bilateral lower extremities. TMA on the right. Sensation not intact to light touch difficulty with dense palpation. Wound about the third digit on the right foot mild drainage.     79 year old male with left distal clavicle fracture and possible quad tendon tear as his physical exam is difficult secondary to weakness   MRI left knee with lateral aspect of quad tendon tear partial tearing without full thickness tear.   -knee immobilizer for the left knee. WBAT in knee immobilizer or hinged knee brace. Keep knee straight.  -discussed that this injury can be treated conservatively as he is extremely high risk for surgical intervention and its associated complications with infection and increased risk of morbidity. To remain in hinged knee brace or knee immobilizer at least 3 more weeks then begin ROM with PT.   -pain control   -ice and elevation   -PT   -dvt ppx  -podiatry managing right toe wound   Regarding left shoulder / distal clavicle fracture   -Avoid forward flexion until further signs of consolidation/healing on  radiographs   Sling as tolerated   PT   Ice   Pain control   -please call/chat  with any questions or concerns.    John Mcgee MD   [facility]   [phone] - [place] Office  [phone] - [place] Office      Electronically signed by John Mcgee MD at 03/10/25 1533       Apurva Smith APRN at 03/10/25 1156          [facility]  Cardiology Progress Note    Patient Identification:  Name: Rock Maciel Jr.  Age: 79 y.o.  Sex: male  :  1945  MRN: [id number]                 Follow Up / Chief Complaint:  follow up preop clearance    Interval History: Resting in bed. No cp or shob.  Incision and drainage, multiple compartments, right foot partial fourth metatarsal excision, right foot by Surgery today with Dr. Mchugh.          Objective:    Past Medical History:  Past Medical History:   Diagnosis Date    Allergic rhinitis     Bronchitis     Coronary artery disease     Diabetes mellitus     DM (diabetes mellitus)     Encounter for annual health examination 2014    Annual Health Assessment    Gout     Hiatal hernia     History of MRSA infection     RIGHT FOOT     Hyperlipidemia     Hypertension     Murmur     Pneumothorax on right     Sleep apnea     pt wears CPAP at night    Wellness examination 2015    Annual Wellness Visit     Past Surgical History:  Past Surgical History:   Procedure Laterality Date    ARTERY SURGERY Bilateral     carotid    CARDIAC CATHETERIZATION N/A 2018    Procedure: Left Heart Cath;  Surgeon: Jo Toney MD;  Location:  [facility];  Service: Cardiovascular    CARDIAC CATHETERIZATION N/A 2018    Procedure: Coronary angiography;  Surgeon: Jo Toney MD;  Location:  [facility];  Service: Cardiovascular    CAROTID ENDARTERECTOMY Bilateral     COLONOSCOPY      CORONARY ARTERY BYPASS GRAFT WITH AORTIC VALVE REPAIR/REPLACEMENT N/A 2018     Procedure: INTRAOPERATIVE ALEX, MIDLINE STERNOTOMY, CORONARY ARTERY BYPASS GRAFTING X 3 USING ENDOSCOPICALLY HARVESTED LEFT GREATER SAPHENOUS VEIN,  AORTIC VALVE REPLACEMENT USING 25MM LOPEZ II ULTRA PORCINE VALVE, PRP;  Surgeon: Phong Posey MD;  Location: [facility] OR;  Service: Cardiothoracic    FOOT SURGERY Right 2010    5th digit removal    FOOT SURGERY Left 2011    1 digit removed    INCISION AND DRAINAGE LEG Right 3/28/2020    Procedure: DEBRIDMENT OF RIGHT CALF;  Surgeon: Ross Pineda MD;  Location: [facility] OR;  Service: Vascular;  Laterality: Right;    INGUINAL HERNIA REPAIR Left 11/29/2024    Procedure: INGUINAL HERNIA REPAIR LAPAROSCOPIC WITH DAVINCI ROBOT;  Surgeon: Phong Lazo MD;  Location: [facility] OR;  Service: Robotics - DaVinci;  Laterality: Left;    QUADRICEPS TENDON REPAIR Left 5/14/2019    Procedure: Left QUADRICEPS TENDON REPAIR;  Surgeon: Camilo Hunter MD;  Location: [facility] OR;  Service: Orthopedics    THORACENTESIS Right 11/21/2016    THORACOSCOPY Right 5/8/2017    Procedure: BRONCHOSCOPY, RIGHT VAT,  TOTAL DECORTICATION RIGHT LUNG, PLEURAL BX, PLACEMENT SUBPLEURAL PAIN CAATHETERS X2;  Surgeon: Donald Orlando III, MD;  Location: [facility] OR;  Service:     TONSILECTOMY, ADENOIDECTOMY, BILATERAL MYRINGOTOMY AND TUBES          Social History:   Social History     Tobacco Use    Smoking status: Never     Passive exposure: Never    Smokeless tobacco: Never   Substance Use Topics    Alcohol use: Yes     Alcohol/week: 7.0 standard drinks of alcohol     Types: 7 Drinks containing 0.5 oz of alcohol per week     Comment: either one glass wine or one glass liquor per  day      Family History:  Family History   Problem Relation Age of Onset    Hypertension Father     Malig Hyperthermia Neg Hx           Allergies:  Allergies   Allergen Reactions    Ceclor [Cefaclor] Rash     Rash in his 50s; he tolerated piperacillin-tazobactam in March 2020      Scheduled Meds:  amiodarone, 200 mg, Daily  [Held by provider] apixaban, 2.5 mg, BID  vitamin C, 250 mg, Daily  atorvastatin, 20 mg, Nightly  bumetanide, 2 mg, BID Diuretics  cholecalciferol, 1,000 Units, Daily  [Held by provider] clopidogrel, 75 mg, Daily  DAPTOmycin, 8 mg/kg (Adjusted), Q48H  epoetin vince/vince-epbx, 10,000 Units, Weekly  hydrALAZINE, 10 mg, TID  insulin glargine, 5 Units, Nightly  insulin lispro, 2-9 Units, 4x Daily AC & at Bedtime  insulin lispro, 6 Units, TID With Meals  linagliptin, 5 mg, Daily  melatonin, 2.5 mg, Nightly  metoprolol succinate XL, 25 mg, Daily  pantoprazole, 40 mg, Q AM  piperacillin-tazobactam, 4.5 g, Q12H  sodium chloride, 3 mL, Q12H  tamsulosin, 0.4 mg, Daily            INTAKE AND OUTPUT:    Intake/Output Summary (Last 24 hours) at 3/10/2025 1156  Last data filed at 3/10/2025 0921  Gross per 24 hour   Intake 320 ml   Output 320 ml   Net 0 ml       Review of Systems:   GI: no n/v  Cardiac: no cp  Pulmonary: no shob    Constitutional:  Temp:  [97.2 °F (36.2 °C)-98.4 °F (36.9 °C)] 98.3 °F (36.8 °C)  Heart Rate:  [58-71] 66  Resp:  [15-20] 18  BP: (121-160)/(38-78) 147/53        Physical Exam:  General:  Alert, cooperative, appears in no acute distress  Respiratory: Clear to auscultation.  Normal respiratory effort and rate.  Cardiovascular: S1S2 Regular rate and rhythm. No murmur, rub or gallop.   Gastrointestinal: soft, non tender. Bowel sounds present.   Extremities: MARSHALL x4. No pretibial pitting edema. Adequate musculoskeletal strength.   Neuro: AAO x3 CN II-XII grossly intact        Cardiographics       ECG:     Echocardiogram:   10/2024  Interpretation Summary         Left ventricular ejection fraction appears to be 61 - 65%.    Left ventricular diastolic function was indeterminate.    The left atrial cavity is moderately dilated.    Left atrial volume is moderately increased.    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    Estimated right ventricular systolic  "pressure from tricuspid regurgitation is moderately elevated (45-55 mmHg).     Lab Review   Results from last 7 days   Lab Units 03/10/25  0529 25  0714 25  0403   CK TOTAL U/L 20 39 52         Results from last 7 days   Lab Units 03/10/25  0529   SODIUM mmol/L 134*   POTASSIUM mmol/L 4.5   BUN mg/dL 91*   CREATININE mg/dL 5.85*   CALCIUM mg/dL 7.8*     @LABRCNTIPbnp@  Results from last 7 days   Lab Units 25  0714 25  0403 25  0449   WBC 10*3/mm3 9.95 11.08* 13.94*   HEMOGLOBIN g/dL 7.6* 7.3* 7.3*   HEMATOCRIT % 24.7* 22.9* 22.3*   PLATELETS 10*3/mm3 261 238 215     Results from last 7 days   Lab Units 25  1408   INR  2.00*   APTT seconds 33.0         Assessment:    Preop clearance for foot surgery  Atrial fibrillation post cardioversion normal sinus rhythm  Chronic amiodarone therapy  Chronic anticoagulation  History of CAD    Plan:  Blood pressure and HR stable s/p surgery.  No change from our perspective.    )3/10/2025  ZEINA Fox      EMR Dragon/Transcription:   \"Dictated utilizing Dragon dictation\".       Electronically signed by Apurva Smith APRN at 03/10/25 1401       Tiesha Mccrary MD at 03/10/25 1117                FOLLOW UP NOTE      Name: Rock KELLY Maciel Jr. ADMIT: 3/3/2025   : 1945  PCP: Denise Dickson APRN    MRN: [id number] LOS: 7 days   AGE/SEX: 79 y.o. male  ROOM: [facility]     Date of Service: 3/10/2025                           CHIEF COMPLIANTS / REASON FOR FOLLOW UP                Subjective:      S patient is s/p podiatric surgery today for toe osteomyelitis.  He is resting in bed.  Has a poor appetite.  On 4 L nasal cannula.  Urine output only 320 mL yesterday.  Urine output gradually declining.     Review of Systems:     Negative except as above       OBJECTIVE                                                                        Exam:  /53 (BP Location: Right arm, Patient Position: Lying)   Pulse 66   Temp 98.3 °F " "(36.8 °C) (Oral)   Resp 18   Ht 185.4 cm (73\")   Wt 127 kg (279 lb 15.8 oz)   SpO2 100%   BMI 36.94 kg/m²   Intake/Output last 3 shifts:  I/O last 3 completed shifts:  In: 260 [P.O.:240; Other:20]  Out: 745 [Urine:745]  Intake/Output this shift:  I/O this shift:  In: 300 [I.V.:300]  Out: -     General Appearance: Chronically ill, pale appearing      Lungs:   Lungs largely clear, diminished at lung bases  Heart: RRR  Abdomen:  Soft, nontender  Extremities: Extremities wrapped, 1+ edema, knee effusion noted  Neurologic:   Alert and oriented  Johnson with brown urine    Scheduled Meds:amiodarone, 200 mg, Oral, Daily  [Held by provider] apixaban, 2.5 mg, Oral, BID  vitamin C, 250 mg, Oral, Daily  atorvastatin, 20 mg, Oral, Nightly  bumetanide, 2 mg, Oral, BID Diuretics  cholecalciferol, 1,000 Units, Oral, Daily  [Held by provider] clopidogrel, 75 mg, Oral, Daily  DAPTOmycin, 8 mg/kg (Adjusted), Intravenous, Q48H  epoetin vince/vince-epbx, 10,000 Units, Subcutaneous, Weekly  hydrALAZINE, 10 mg, Oral, TID  insulin glargine, 5 Units, Subcutaneous, Nightly  insulin lispro, 2-9 Units, Subcutaneous, 4x Daily AC & at Bedtime  insulin lispro, 6 Units, Subcutaneous, TID With Meals  linagliptin, 5 mg, Oral, Daily  melatonin, 2.5 mg, Oral, Nightly  metoprolol succinate XL, 25 mg, Oral, Daily  pantoprazole, 40 mg, Oral, Q AM  piperacillin-tazobactam, 4.5 g, Intravenous, Q12H  sodium chloride, 3 mL, Intravenous, Q12H  tamsulosin, 0.4 mg, Oral, Daily      Continuous Infusions:lactated ringers, 9 mL/hr, Last Rate: 9 mL/hr (03/10/25 0893)  Pharmacy Consult - Pharmacy to dose,         PRN Meds:  acetaminophen **OR** acetaminophen **OR** acetaminophen    artificial tears    senna-docusate sodium **AND** polyethylene glycol **AND** bisacodyl **AND** bisacodyl    cadexomer iodine    dextrose    dextrose    famotidine    glucagon (human recombinant)    HYDROcodone-acetaminophen    nitroglycerin    ondansetron ODT **OR** ondansetron    " Pharmacy Consult - Pharmacy to dose    sodium chloride    sodium chloride         Data Review:                                                                           Labs reviewed        Imaging:                                                                           Radiology reviewed           ASSESSMENT:                                                                                Traumatic rhabdomyolysis    Arteriosclerosis of coronary artery    Essential hypertension    Type 2 diabetes mellitus with circulatory disorder, without long-term current use of insulin    Hyperlipidemia    Charcot-Davida-Tooth disease-like deformity of foot    Fall    S/P CABG x 2    KILLIAN (acute kidney injury)    Chronic diastolic (congestive) heart failure    Stage 3b chronic kidney disease    Chronic anticoagulation    Persistent atrial fibrillation    Closed displaced fracture of left clavicle    Pneumonia due to infectious organism    ATN (acute tubular necrosis)    Contusion of left knee    Acute osteomyelitis of right foot    Diabetic foot infection    MRSA (methicillin resistant Staphylococcus aureus) infection         KILLIAN: likely ATN related to rhabdomyolysis, prerenal insult related to diuretics etc. stabilizing with good urine output.  At this point, I remain concerned for CKD progression with significant baseline chronicity, recurrent KILLIAN, uncontrolled diabetes etc.  Rhabdomyolysis related to fall: Resolved.  Lower extremity osteomyelitis/cellulitis  Edema feet  CKD stage III-IV: Baseline creatinine  1.8-2.6 mg/dL with baseline GFR around 25 mL/min  Acute urinary retention requiring Johnson catheter placement.  Clavicle fracture  A-fib with controlled rates.  History of HFpEF with pulmonary hypertension: Slightly volume expanded.  Severe anemia in CKD: TSAT 11%.  Cannot rule out occult GI bleed given anticoagulation.          PLAN:                                                                            GFR worsening  with borderline uremic symptoms and some oliguria.  Discussed need for RRT initiation with the patient.  Discussed benefits versus risks specifically related to bleeding/infection with catheter placement.  Discussed risks of allergy/anaphylaxis, nausea, vomiting, dialysis disequilibrium, headache, blurred vision, hypotension, death as a complication of dialysis.  Patient understands and willing to proceed.  Will request vascular consult for TDC placement.  Plan for first HD treatment once TDC placed.  Reason for GFR decline likely multifactorial, some ATN, ongoing osteomyelitis etc.  At this point, diagnosis is still KILLIAN needing HD.   Podiatry following for foot osteomyelitis.  Antibiotics dosed for  CrCl<15 mL/min.  Follow-up with ID.  Johnson management per urology.  Continue weekly EPO.  Switch to 3 times a week once initiated on HD  Resume diuretics.     Plan D/W at length with Dr. Chakraborty and RN.  Answered all of patient's questions.    Tiesha Mccrary MD  [facility]  3/10/2025  11:24 EDT        Electronically signed by Tiesha Mccrary MD at 03/10/25 1125          Consult Notes (last 48 hours)        Jeaneth Bonds MD at 25 0846        Consult Orders    1. Inpatient Vascular Surgery Consult [[id number]] ordered by Tiesha Mccrary MD                   Name: Rock Maciel  ADMIT: 3/3/2025   : 1945  PCP: Denise Dickson APRN    MRN: [id number] LOS: 8 days   AGE/SEX: 79 y.o. male  ROOM: [facility]     Inpatient Vascular Surgery Consult  Consult performed by: Jeaneth Bonds MD  Consult ordered by: Tiesha Mccrary MD Ashlee Ann Vinyard, MD     LOS: 8 days   Patient Care Team:  Denise Dickson APRN as PCP - General (Nurse Practitioner)  Azam Banegas Jr., MD as Consulting Physician (Cardiology)  Jamil Stevens MD as Consulting Physician (Nephrology)    Subjective     Chief complaint: KILLIAN, CKD    History of Present Illness  79 y.o. male with CAD, type 2  diabetes mellitus, HTN, HLD, SHASTA, morbid obesity, and right diabetic foot infection who has been admitted to [facility] for a right diabetic foot infection and left clavicle fracture.  He has baseline CKD 3 but during his hospitalization he has developed KILLIAN and nephrology would like to initiate hemodialysis due to uremia.  He has never been on dialysis before.  He is right hand dominant.  He has limited ROM and swelling of the left arm due to the clavicular fracture but at baseline he has normal left arm function and no swelling.  He has never had a pacemaker, defibrillator, or chemoport.  He does take eliquis but this has been held.      Review of Systems   Constitutional:  Negative for chills and fever.   All other systems reviewed and are negative.      Past Medical History:   Diagnosis Date    Allergic rhinitis     Bronchitis     Coronary artery disease     Diabetes mellitus 2001    DM (diabetes mellitus)     Encounter for annual health examination 05/06/2014    Annual Health Assessment    Gout     Hiatal hernia     History of MRSA infection     RIGHT FOOT 2010    Hyperlipidemia     Hypertension     Murmur     Pneumothorax on right     Sleep apnea     pt wears CPAP at night    Wellness examination 06/24/2015    Annual Wellness Visit       Past Surgical History:   Procedure Laterality Date    ARTERY SURGERY Bilateral     carotid    CARDIAC CATHETERIZATION N/A 7/20/2018    Procedure: Left Heart Cath;  Surgeon: Jo Toney MD;  Location:  [facility];  Service: Cardiovascular    CARDIAC CATHETERIZATION N/A 7/20/2018    Procedure: Coronary angiography;  Surgeon: Jo Toney MD;  Location:  [facility];  Service: Cardiovascular    CAROTID ENDARTERECTOMY Bilateral     COLONOSCOPY  2008    CORONARY ARTERY BYPASS GRAFT WITH AORTIC VALVE REPAIR/REPLACEMENT N/A 7/23/2018    Procedure: INTRAOPERATIVE ALEX, MIDLINE STERNOTOMY, CORONARY ARTERY BYPASS GRAFTING X 3 USING  ENDOSCOPICALLY HARVESTED LEFT GREATER SAPHENOUS VEIN,  AORTIC VALVE REPLACEMENT USING 25MM LOPEZ II ULTRA PORCINE VALVE, PRP;  Surgeon: Phong Posey MD;  Location: [facility] MAIN OR;  Service: Cardiothoracic    FOOT SURGERY Right 2010    5th digit removal    FOOT SURGERY Left 2011    1 digit removed    INCISION AND DRAINAGE LEG Right 3/28/2020    Procedure: DEBRIDMENT OF RIGHT CALF;  Surgeon: Ross Pineda MD;  Location: [facility] MAIN OR;  Service: Vascular;  Laterality: Right;    INGUINAL HERNIA REPAIR Left 11/29/2024    Procedure: INGUINAL HERNIA REPAIR LAPAROSCOPIC WITH DAVINCI ROBOT;  Surgeon: Phong Lazo MD;  Location: [facility] MAIN OR;  Service: Robotics - DaVinci;  Laterality: Left;    QUADRICEPS TENDON REPAIR Left 5/14/2019    Procedure: Left QUADRICEPS TENDON REPAIR;  Surgeon: Camilo Hunter MD;  Location: [facility] MAIN OR;  Service: Orthopedics    THORACENTESIS Right 11/21/2016    THORACOSCOPY Right 5/8/2017    Procedure: BRONCHOSCOPY, RIGHT VAT,  TOTAL DECORTICATION RIGHT LUNG, PLEURAL BX, PLACEMENT SUBPLEURAL PAIN CAATHETERS X2;  Surgeon: Donald Orlando III, MD;  Location: [facility] OR;  Service:     TONSILECTOMY, ADENOIDECTOMY, BILATERAL MYRINGOTOMY AND TUBES         Family History   Problem Relation Age of Onset    Hypertension Father     Malig Hyperthermia Neg Hx          Social History     Tobacco Use    Smoking status: Never     Passive exposure: Never    Smokeless tobacco: Never   Vaping Use    Vaping status: Never Used   Substance Use Topics    Alcohol use: Yes     Alcohol/week: 7.0 standard drinks of alcohol     Types: 7 Drinks containing 0.5 oz of alcohol per week     Comment: either one glass wine or one glass liquor per  day    Drug use: Never       Allergies: Ceclor [cefaclor]    Medications Prior to Admission   Medication Sig Dispense Refill Last Dose/Taking    acetaminophen (TYLENOL) 325 MG tablet Take 2 tablets by mouth Every 6 (Six) Hours As Needed for Mild  Pain.   3/2/2025    amiodarone (PACERONE) 200 MG tablet Take 1 tablet by mouth Daily. 30 tablet 5 3/2/2025    apixaban (ELIQUIS) 2.5 MG tablet tablet Take 1 tablet by mouth 2 (Two) Times a Day. Indications: Atrial Fibrillation 60 tablet 5 3/2/2025    atorvastatin (LIPITOR) 20 MG tablet Take 1 tablet by mouth Every Night. NEED TO SEE PROVIDER FOR FUTURE REFILLS. 30 tablet 0 3/2/2025    bumetanide (BUMEX) 1 MG tablet Take 1 tablet by mouth 2 (Two) Times a Day. 60 tablet 5 3/2/2025    Cholecalciferol 25 MCG (1000 UT) tablet Take 1 tablet by mouth Every 7 (Seven) Days.   3/2/2025    clopidogrel (PLAVIX) 75 MG tablet Take 1 tablet by mouth Daily. 90 tablet 1 3/2/2025    glipizide (GLUCOTROL) 5 MG tablet TAKE 1 TABLET BY MOUTH 2 TIMES A DAY BEFORE MEALS. 180 tablet 1 3/2/2025    glucose blood (Accu-Chek Keshia Plus) test strip USE TO TEST BLOOD SUGAR    TWICE DAILY FOR DIABETES 100 each 8 3/2/2025    hydrALAZINE (APRESOLINE) 25 MG tablet Take 1 tablet by mouth 3 (Three) Times a Day. 90 tablet 5 3/2/2025    linagliptin (Tradjenta) 5 MG tablet tablet Take 1 tablet by mouth Daily. 90 tablet 1 Past Week    metoprolol succinate XL (TOPROL-XL) 25 MG 24 hr tablet Take 1 tablet by mouth Daily. 30 tablet 5 3/10/2025 at  7:12 AM    terazosin (HYTRIN) 2 MG capsule Take 1 capsule by mouth Every Night. 90 capsule 1 Past Week    vitamin C (ASCORBIC ACID) 250 MG tablet Take 1 tablet by mouth Daily.   3/2/2025    Zinc 50 MG tablet    3/2/2025    nitroglycerin (NITROSTAT) 0.4 MG SL tablet Place 1 tablet under the tongue Every 5 (Five) Minutes As Needed for Chest Pain (Only if SBP Greater Than 100). Take no more than 3 doses in 15 minutes. (Patient not taking: Reported on 12/20/2024) 25 tablet 0 Unknown     amiodarone, 200 mg, Oral, Daily  [Held by provider] apixaban, 2.5 mg, Oral, BID  vitamin C, 250 mg, Oral, Daily  atorvastatin, 20 mg, Oral, Nightly  bumetanide, 2 mg, Oral, BID Diuretics  cholecalciferol, 1,000 Units, Oral, Daily  [Held  by provider] clopidogrel, 75 mg, Oral, Daily  DAPTOmycin, 8 mg/kg (Adjusted), Intravenous, Q48H  epoetin vince/vince-epbx, 10,000 Units, Subcutaneous, Weekly  hydrALAZINE, 10 mg, Oral, TID  insulin glargine, 5 Units, Subcutaneous, Nightly  insulin lispro, 2-9 Units, Subcutaneous, 4x Daily AC & at Bedtime  insulin lispro, 6 Units, Subcutaneous, TID With Meals  linagliptin, 5 mg, Oral, Daily  melatonin, 2.5 mg, Oral, Nightly  Menthol-Zinc Oxide, 1 Application, Topical, Q12H  metoprolol succinate XL, 25 mg, Oral, Daily  pantoprazole, 40 mg, Oral, Q AM  piperacillin-tazobactam, 4.5 g, Intravenous, Q12H  sodium chloride, 3 mL, Intravenous, Q12H  tamsulosin, 0.4 mg, Oral, Daily      Pharmacy Consult - Pharmacy to dose,         acetaminophen **OR** acetaminophen **OR** acetaminophen    artificial tears    senna-docusate sodium **AND** polyethylene glycol **AND** bisacodyl **AND** bisacodyl    cadexomer iodine    dextrose    dextrose    famotidine    glucagon (human recombinant)    HYDROcodone-acetaminophen    nitroglycerin    ondansetron ODT **OR** ondansetron    Pharmacy Consult - Pharmacy to dose    sodium chloride    sodium chloride      Objective   Temp:  [97.2 °F (36.2 °C)-98.3 °F (36.8 °C)] 97.3 °F (36.3 °C)  Heart Rate:  [58-67] 61  Resp:  [15-18] 18  BP: (121-153)/(38-71) 153/48    No intake/output data recorded.    Physical Exam  Vitals reviewed.   Constitutional:       General: He is not in acute distress.     Appearance: Normal appearance. He is morbidly obese.   HENT:      Head: Normocephalic and atraumatic.      Right Ear: External ear normal.      Left Ear: External ear normal.      Nose: Nose normal.      Mouth/Throat:      Mouth: Mucous membranes are moist.      Pharynx: Oropharynx is clear.   Eyes:      Extraocular Movements: Extraocular movements intact.      Conjunctiva/sclera: Conjunctivae normal.      Pupils: Pupils are equal, round, and reactive to light.   Cardiovascular:      Rate and Rhythm: Normal  rate and regular rhythm.      Pulses:           Carotid pulses are 2+ on the right side and 2+ on the left side.       Radial pulses are 2+ on the right side and 2+ on the left side.        Dorsalis pedis pulses are 1+ on the left side.        Posterior tibial pulses are 1+ on the left side.      Comments: 2+ brachial pulses bilaterally  1+ LUE edema  No dilated superficial chest or upper extremity veins  Right foot with dressing in place, palpable left pedal pulses  Pulmonary:      Comments: Non labored respirations on room air, equal chest rise  Abdominal:      General: Abdomen is flat. There is no distension.      Palpations: Abdomen is soft.   Musculoskeletal:      Cervical back: Normal range of motion. No rigidity.      Right lower leg: No edema.      Left lower leg: No edema.   Skin:     General: Skin is warm and dry.      Capillary Refill: Capillary refill takes less than 2 seconds.   Neurological:      General: No focal deficit present.      Mental Status: He is alert and oriented to person, place, and time.   Psychiatric:         Mood and Affect: Mood normal.         Behavior: Behavior normal.         Results from last 7 days   Lab Units 03/11/25  0518 03/09/25  0714 03/08/25  0403 03/07/25  0449 03/06/25  0508 03/05/25  0604   WBC 10*3/mm3 9.23 9.95 11.08* 13.94* 15.51* 14.98*   HEMOGLOBIN g/dL 7.0* 7.6* 7.3* 7.3* 7.0* 6.2*   PLATELETS 10*3/mm3 263 261 238 215 193 182     Results from last 7 days   Lab Units 03/11/25  0518 03/10/25  0529 03/09/25  0714 03/08/25  0403 03/07/25  0449 03/06/25  0508 03/05/25  0604   SODIUM mmol/L 136 134* 138 138 138 135* 138   POTASSIUM mmol/L 4.9 4.5 4.3 4.1 3.9 4.1 3.9   CHLORIDE mmol/L 100 100 101 103 102 101 101   CO2 mmol/L 21.0* 19.8* 23.0 22.5 23.0 24.3 24.9   BUN mg/dL 107* 91* 89* 76* 69* 67* 71*   CREATININE mg/dL 6.67* 5.85* 5.05* 4.22* 3.97* 3.85* 3.91*   GLUCOSE mg/dL 146* 131* 131* 119* 149* 147* 126*   Estimated Creatinine Clearance: 12.3 mL/min (A) (by C-G  formula based on SCr of 6.67 mg/dL (H)).      Imaging Studies:  CXR, 3/9/25        Data Points:  During this visit the following were done:  Labs Reviewed [x]    Labs Ordered []    Radiology Reports Reviewed [x]    Radiology Images Reviewed [x]    Radiology Ordered []    EKG, echo, and/or stress test reviewed []    Vascular lab results reviewed  []    Vascular lab images reviewed and interpreted per myself   []    Referring Provider Records Reviewed []    ER Records Reviewed []    Hospital Records Reviewed/Summarized [x]    History Obtained From Family []    Radiological images view and Interpreted per myself []    Case Discussed with referring provider []     Decision to obtain and request outside records  []    Total data points reviewed: 4      Active Hospital Problems    Diagnosis  POA    **Traumatic rhabdomyolysis [T79.6XXA]  Yes    MRSA (methicillin resistant Staphylococcus aureus) infection [A49.02]  Yes    Contusion of left knee [S80.02XA]  Yes    Acute osteomyelitis of right foot [M86.071]  Yes    Diabetic foot infection [E11.628, L08.9]  Yes    ATN (acute tubular necrosis) [N17.0]  Yes    Closed displaced fracture of left clavicle [S42.002A]  Yes    Pneumonia due to infectious organism [J18.9]  Yes    Acute kidney injury [N17.9]  Unknown    Persistent atrial fibrillation [I48.19]  Yes    Chronic anticoagulation [Z79.01]  Not Applicable    Stage 3b chronic kidney disease [N18.32]  Yes    Chronic diastolic (congestive) heart failure [I50.32]  Yes    KILLIAN (acute kidney injury) [N17.9]  Yes    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          Assessment & Plan       Traumatic rhabdomyolysis    Arteriosclerosis of coronary artery    Essential hypertension    Type 2 diabetes mellitus with circulatory disorder, without long-term current use of insulin    Hyperlipidemia    Charcot-Davida-Tooth disease-like deformity of foot    Fall    S/P CABG x 2    KILLIAN (acute kidney injury)    Chronic diastolic (congestive) heart failure    Stage 3b chronic kidney disease    Chronic anticoagulation    Persistent atrial fibrillation    Closed displaced fracture of left clavicle    Pneumonia due to infectious organism    ATN (acute tubular necrosis)    Contusion of left knee    Acute osteomyelitis of right foot    Diabetic foot infection    MRSA (methicillin resistant Staphylococcus aureus) infection    Acute kidney injury        79 y.o. male with baseline CKD stage 3 who has been hospitalized for a right diabetic foot infection and has developed KILLIAN who now needs to initiate hemodialysis    -discussed dialysis access with patient at length.  We are planning for tunneled catheter placement today in the OR.  I discussed the risks, benefits, and alternatives of this procedure, including but not limited to bleeding, infection, and pneumothorax.  He expressed understanding and would like to proceed. I also discussed the possible need for long term AV access, such as a fistula or graft.  Ideally, this would be placed in the left arm but this is complicated by his current clavicular fracture, which I understand will be treated non operatively.  Will plan for outpatient follow up and if AV access is needed, elective outpatient placement of AV access once his clavicular fracture has healed.      I discussed the patients findings and my recommendations with patient and nursing staff.  Please call my office with any question: [phone]    Jeaneth Bonds MD  03/11/25  08:58 EDT                  Electronically signed by Jeaneth Bonds MD at 03/11/25 4346        Jeaneth Bonds MD at 03/10/25 0364          Consult received for tunneled catheter placement.  Patient posted for tunneled catheter placement tomorrow, only OR availability is in the afternoon.  Will discuss with OR desk to see if this can be done earlier in the day if there is staff availability.  Full consult to follow.    Jeaneth Bonds MD  Vascular Surgery  O: [phone]  F: [phone]      Electronically signed by Jeaneth Bonds MD at 03/10/25 4883

## 2025-03-12 NOTE — NURSING NOTE
Pt came to unit without labs drawn. Hgb was 7 on previous labs. Needs new T&S. Needs a second IV. Has pneumothorax. Needs Chest x-ray to be read. Dr. Mchugh and Dr. Wolff agreed pt should wait to have procedure until results are ready. Pt cancelled for now. Floor RN aware. Report given. I asked floor RN to contact IV team to get another IV for pt preferably an 18 gauge.

## 2025-03-12 NOTE — PROGRESS NOTES
Name: Rock Maciel Jr. ADMIT: 3/3/2025   : 1945  PCP: Denise Dickson APRN    MRN: [id number] LOS: 9 days   AGE/SEX: 79 y.o. male  ROOM: [facility]     Subjective   Subjective     No events overnight. He's having some mild post operative pain at his TDC insertion site with movement. His chest xray this morning did not show a pneumothorax. His surgery was canceled because his hgb was less than 7. Discussed with Dr. Mchugh       Objective   Objective   Vital Signs  Temp:  [97.3 °F (36.3 °C)-98.2 °F (36.8 °C)] 97.3 °F (36.3 °C)  Heart Rate:  [57-69] 65  Resp:  [17-21] 18  BP: (135-179)/(46-67) 153/61  SpO2:  [92 %-100 %] 100 %  on  Flow (L/min) (Oxygen Therapy):  [2] 2;   Device (Oxygen Therapy): nasal cannula  Body mass index is 37.03 kg/m².  Physical Exam  Constitutional:       General: He is not in acute distress.     Appearance: He is obese. He is not toxic-appearing.   Cardiovascular:      Rate and Rhythm: Normal rate and regular rhythm.   Pulmonary:      Effort: Pulmonary effort is normal.      Breath sounds: Normal breath sounds.   Abdominal:      General: Bowel sounds are normal.      Palpations: Abdomen is soft.   Musculoskeletal:         General: No tenderness.      Right lower leg: No edema.      Left lower leg: No edema.      Comments: Left transmetatarsal amputation  Right foot bandaged   Skin:     Comments: Right chest tunneled dialysis catheter   Neurological:      Mental Status: He is alert.   Psychiatric:         Mood and Affect: Mood normal.         Behavior: Behavior normal.         Results Review     I reviewed the patient's new clinical results.  Results from last 7 days   Lab Units 25  0727 25  0518 25  0714 25  0403   WBC 10*3/mm3 9.14 9.23 9.95 11.08*   HEMOGLOBIN g/dL 6.8* 7.0* 7.6* 7.3*   PLATELETS 10*3/mm3 274 263 261 238     Results from last 7 days   Lab Units 25  0727 25  0518 03/10/25  0529 25  0714   SODIUM mmol/L 134* 136 134* 138  "  POTASSIUM mmol/L 5.2 4.9 4.5 4.3   CHLORIDE mmol/L 99 100 100 101   CO2 mmol/L 20.7* 21.0* 19.8* 23.0   BUN mg/dL 111* 107* 91* 89*   CREATININE mg/dL 7.39* 6.67* 5.85* 5.05*   GLUCOSE mg/dL 152* 146* 131* 131*   Estimated Creatinine Clearance: 11.1 mL/min (A) (by C-G formula based on SCr of 7.39 mg/dL (H)).  Results from last 7 days   Lab Units 03/06/25  0508   ALBUMIN g/dL 2.5*   BILIRUBIN mg/dL 0.9   ALK PHOS U/L 220*   AST (SGOT) U/L 63*   ALT (SGPT) U/L 61*     Results from last 7 days   Lab Units 03/12/25  0727 03/11/25  0518 03/10/25  0529 03/09/25  0714 03/07/25  0449 03/06/25  0508   CALCIUM mg/dL 8.2* 8.0* 7.8* 8.1*   < > 8.0*   ALBUMIN g/dL  --   --   --   --   --  2.5*    < > = values in this interval not displayed.     No results found for: \"COVID19\"  Glucose   Date/Time Value Ref Range Status   03/12/2025 1125 156 (H) 70 - 130 mg/dL Final   03/12/2025 0606 168 (H) 70 - 130 mg/dL Final   03/11/2025 2121 168 (H) 70 - 130 mg/dL Final   03/11/2025 1654 155 (H) 70 - 130 mg/dL Final   03/11/2025 1102 161 (H) 70 - 130 mg/dL Final   03/11/2025 0647 158 (H) 70 - 130 mg/dL Final   03/10/2025 2126 215 (H) 70 - 130 mg/dL Final       XR Chest 2 View  Narrative: XR CHEST 2 VW-     HISTORY: Male who is 79 years-old, pneumothorax, follow-up     TECHNIQUE: Frontal and lateral views of the chest     COMPARISON: 3/12/2025     FINDINGS: Right-sided central venous catheter appears stable. Sternotomy  wires are noted. Heart is enlarged. Pulmonary vasculature is congested.  Bilateral pulmonary opacities appear similar to prior exam. Small left  pleural effusion is apparent. No pneumothorax. No acute osseous process.     Impression: No significant change.     This report was finalized on 3/12/2025 1:37 PM by Dr. Azam Alaniz M.D on Workstation: [id number]     XR Chest 1 View  Narrative: XR CHEST 1 VW-     HISTORY: Male who is 79 years-old, pneumothorax, follow-up     TECHNIQUE: Frontal views of the chest     COMPARISON: " 3/11/2025     FINDINGS: Right-sided central venous catheter appears stable. Sternotomy  wires are noted. The heart is enlarged. Pulmonary vasculature appears  mildly congested. Some hazy opacity at the left midlung may represent  infiltrate, follow-up advised. Minimal left pleural effusion is  suggested. No definite pneumothorax. Right hemidiaphragm remains  elevated. No acute osseous process.     Impression: As described.     This report was finalized on 3/12/2025 12:00 PM by Dr. Azam Alaniz M.D on Workstation: [id number]       Scheduled Medications  amiodarone, 200 mg, Oral, Daily  [Held by provider] apixaban, 2.5 mg, Oral, BID  vitamin C, 250 mg, Oral, Daily  atorvastatin, 20 mg, Oral, Nightly  bumetanide, 2 mg, Oral, BID Diuretics  cholecalciferol, 1,000 Units, Oral, Daily  [Held by provider] clopidogrel, 75 mg, Oral, Daily  DAPTOmycin, 8 mg/kg (Adjusted), Intravenous, Q48H  epoetin vince/vince-epbx, 10,000 Units, Intravenous, Once per day on Monday Wednesday Friday  hydrALAZINE, 10 mg, Oral, TID  insulin glargine, 5 Units, Subcutaneous, Nightly  insulin lispro, 2-9 Units, Subcutaneous, 4x Daily AC & at Bedtime  insulin lispro, 6 Units, Subcutaneous, TID With Meals  linagliptin, 5 mg, Oral, Daily  melatonin, 2.5 mg, Oral, Nightly  Menthol-Zinc Oxide, 1 Application, Topical, Q12H  metoprolol succinate XL, 25 mg, Oral, Daily  pantoprazole, 40 mg, Oral, Q AM  piperacillin-tazobactam, 4.5 g, Intravenous, Q12H  sodium chloride, 3 mL, Intravenous, Q12H  tamsulosin, 0.4 mg, Oral, Daily    Infusions  lactated ringers, 9 mL/hr, Last Rate: Stopped (03/12/25 0944)  Pharmacy Consult - Pharmacy to dose,     Diet  Diet: Regular/House, Cardiac, Diabetic; Healthy Heart (2-3 Na+); Consistent Carbohydrate; Fluid Consistency: Thin (IDDSI 0)       Assessment/Plan     Active Hospital Problems    Diagnosis  POA    **Traumatic rhabdomyolysis [T79.6XXA]  Yes    MRSA (methicillin resistant Staphylococcus aureus) infection [A49.02]   Yes    Contusion of left knee [S80.02XA]  Yes    Acute osteomyelitis of right foot [M86.071]  Yes    Diabetic foot infection [E11.628, L08.9]  Yes    ATN (acute tubular necrosis) [N17.0]  Yes    Closed displaced fracture of left clavicle [S42.002A]  Yes    Pneumonia due to infectious organism [J18.9]  Yes    Acute kidney injury [N17.9]  Unknown    Persistent atrial fibrillation [I48.19]  Yes    Chronic anticoagulation [Z79.01]  Not Applicable    Stage 3b chronic kidney disease [N18.32]  Yes    Chronic diastolic (congestive) heart failure [I50.32]  Yes    KILLIAN (acute kidney injury) [N17.9]  Yes    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       79 y.o. male admitted with Traumatic rhabdomyolysis.    79 year old man who presented following a mechanical fall leading to rhabdomyolysis, left knee contusion with left knee effusion and meniscus tear, and a clavicular fracture. He was also found to have KILLIAN on CKD 4, pneumonia, osteomyelitis and abscess/cellulitis of the right foot.    Traumatic rhabdomyolysis-resolved with IVF.   KILLIAN on CKD 4-prerenal due to ATN. Now s/p tunneled dialysis cathter placement today with plans to initiate HD today  Post procedural right apical pneumothorax-tiny. Gone on repeat chest xray  Pneumonia-on IV zosyn  Osteomyelitis and abscess of the right foot-s/p I&D and partial 4th metatarsal excision on 3/10/25 by Dr. Mchugh. There are plans to return to the OR tomorrow for debridement of the surgical site. Delayed today due to low hgb. Currently on daptomycin per ID.  Acute urinary retention/BPH-abdi catheter placed.  Urology evaluated and recommended flomax and a voiding trial on the morning of discharge.  Left clavicular fracture-orthopedic surgery evaluated and recommended a sling as tolerated, ice, pain control, and to avoid forward flexion for now  Left knee contusion with left knee effusion and meniscus tear-orthopedic surgery recommends a knee brace/immobilizer for at least 3 weeks and then begin range of motion exercises with PT  Anemia of chronic disease/CKD-hgb is down to 6.8 today. Will order 1 unit PRBCs to be administered with dialysis this afternoon  Chronic afib-amiodarone. Eliquis is held acutely for procedures  Chronic diastolic heart failure-bumex  Coronary artery disease-plavix is held acutely for procedures. Statin/bb  Type 2 diabetes-A1c is 6. Glucose is at goal acutely on current regimen  Essential hypertension-adequate control acutely  Hyperlipidemia-statin  SCDs for DVT prophylaxis.  Full code.  Discussed with patient, nursing staff, and consulting provider.  Anticipate discharge to SNU facility timing yet to be determined.      Yovanny Kowalski MD  [facility]  03/12/25  14:32 EDT

## 2025-03-12 NOTE — PROGRESS NOTES
[facility]   Vascular Surgery Progress Note    Patient Name: Rock Maciel Jr.  : 1945  MRN: [id number]  Date of admission: 3/3/2025  Surgical Procedures Since Admission:  Procedure(s):  INCISION AND DRAINAGE LOWER EXTREMITY  BONE EXCISION LOWER EXTERMITY, RIGHT  Surgeon:  Jimenez Mchugh DPM  Status:  2 Days Post-Op  -------------------    Procedure(s):  HEMODIALYSIS CATHETER INSERTION  Surgeon:  Jeaneth Bonds MD  Status:  1 Day Post-Op  -------------------    Procedure(s):  DEBRIDEMENT FOOT, RIGHT  Surgeon:  Jimenez Mchugh DPM  Status:  * No surgery found *  -------------------    Subjective   Subjective     Chief Complaint: follow up TDC placement    History of Present Illness   Patient seen in dialysis.  No current complaints.  NAEON.  AF.      Review of Systems   Respiratory:  Negative for shortness of breath.    All other systems reviewed and are negative.      Objective   Objective     Vitals:   Temp:  [97.3 °F (36.3 °C)-98.2 °F (36.8 °C)] 97.3 °F (36.3 °C)  Heart Rate:  [57-69] 65  Resp:  [17-21] 18  BP: (135-179)/(46-67) 153/61  Flow (L/min) (Oxygen Therapy):  [2] 2  Output by Drain (mL) 25 0701 - 25 1900 25 1901 - 25 0700 25 0701 - 25 1633 Range Total   Urethral Catheter Silicone 16 Fr. 100   100       Physical Exam    R IJV catheter in place, no hematoma, puncture site c/d/i    Result Review    Result Review:  I have personally reviewed the results from the time of this admission to 3/12/2025 16:33 EDT and agree with these findings:  []  Laboratory list / accordion  []  Microbiology  [x]  Radiology  []  EKG/Telemetry   []  Cardiology/Vascular   []  Pathology  []  Old records  []  Other:        Assessment & Plan   Assessment / Plan     Brief Patient Summary:  Rock Maciel Jr. is a 79 y.o. male who is POD 1 s/p right IJV TDC placement    Active Hospital Problems:   Active Hospital Problems    Diagnosis     **Traumatic rhabdomyolysis     MRSA  (methicillin resistant Staphylococcus aureus) infection     Contusion of left knee     Acute osteomyelitis of right foot     Diabetic foot infection     ATN (acute tubular necrosis)     Closed displaced fracture of left clavicle     Pneumonia due to infectious organism     Acute kidney injury     Persistent atrial fibrillation     Chronic anticoagulation     Stage 3b chronic kidney disease     Chronic diastolic (congestive) heart failure     KILLIAN (acute kidney injury)     S/P CABG x 2     Fall     Charcot-Davida-Tooth disease-like deformity of foot     Hyperlipidemia     Type 2 diabetes mellitus with circulatory disorder, without long-term current use of insulin     Essential hypertension     Arteriosclerosis of coronary artery      Plan:   -initial post op xray interpreted as pneumothorax but radiology but compared to two days prior chest xray there was no change.  Repeat chest xrays today confirm no pneumothorax.  -follow up with me in 4-6 weeks outpatient to discuss indwelling access    VTE Prophylaxis:  Pharmacologic VTE prophylaxis orders are present.        Jeaneth Bonds MD

## 2025-03-12 NOTE — PROGRESS NOTES
"      FOLLOW UP NOTE      Name: Rock Maciel Jr. ADMIT: 3/3/2025   : 1945  PCP: Denise Dickson APRN    MRN: [id number] LOS: 9 days   AGE/SEX: 79 y.o. male  ROOM: [facility]     Date of Service: 3/12/2025                           CHIEF COMPLIANTS / REASON FOR FOLLOW UP                Subjective:    Resting comfortably in bed.  Had TDC done yesterday.  Urine output only 100 mL.       Review of Systems:     Negative except as above       OBJECTIVE                                                                        Exam:  /56 (BP Location: Right arm, Patient Position: Lying)   Pulse 64   Temp 97.3 °F (36.3 °C) (Oral)   Resp 18   Ht 185.4 cm (73\")   Wt 127 kg (280 lb 10.3 oz)   SpO2 98%   BMI 37.03 kg/m²   Intake/Output last 3 shifts:  I/O last 3 completed shifts:  In: 200 [I.V.:200]  Out: 300 [Urine:300]  Intake/Output this shift:  No intake/output data recorded.    General Appearance: Chronically ill, pale appearing      Lungs:   Lungs largely clear, diminished at lung bases  Heart: RRR  Abdomen:  Soft, nontender  Extremities: Extremities wrapped, 1+ edema, knee effusion noted  Neurologic:   Alert and oriented  RIJ TDC      Scheduled Meds:amiodarone, 200 mg, Oral, Daily  [Held by provider] apixaban, 2.5 mg, Oral, BID  vitamin C, 250 mg, Oral, Daily  atorvastatin, 20 mg, Oral, Nightly  bumetanide, 2 mg, Oral, BID Diuretics  cholecalciferol, 1,000 Units, Oral, Daily  [Held by provider] clopidogrel, 75 mg, Oral, Daily  DAPTOmycin, 8 mg/kg (Adjusted), Intravenous, Q48H  epoetin vince/vince-epbx, 10,000 Units, Intravenous, Once per day on   hydrALAZINE, 10 mg, Oral, TID  insulin glargine, 5 Units, Subcutaneous, Nightly  insulin lispro, 2-9 Units, Subcutaneous, 4x Daily AC & at Bedtime  insulin lispro, 6 Units, Subcutaneous, TID With Meals  linagliptin, 5 mg, Oral, Daily  melatonin, 2.5 mg, Oral, Nightly  Menthol-Zinc Oxide, 1 Application, Topical, Q12H  metoprolol succinate " XL, 25 mg, Oral, Daily  pantoprazole, 40 mg, Oral, Q AM  piperacillin-tazobactam, 4.5 g, Intravenous, Q12H  sodium chloride, 3 mL, Intravenous, Q12H  tamsulosin, 0.4 mg, Oral, Daily      Continuous Infusions:lactated ringers, 9 mL/hr, Last Rate: Stopped (03/12/25 0944)  Pharmacy Consult - Pharmacy to dose,         PRN Meds:  artificial tears    senna-docusate sodium **AND** polyethylene glycol **AND** bisacodyl **AND** bisacodyl    cadexomer iodine    dextrose    dextrose    famotidine    glucagon (human recombinant)    HYDROcodone-acetaminophen    nitroglycerin    ondansetron ODT **OR** ondansetron    Pharmacy Consult - Pharmacy to dose    sodium chloride    sodium chloride         Data Review:                                                                           Labs reviewed        Imaging:                                                                           Radiology reviewed           ASSESSMENT:                                                                                Traumatic rhabdomyolysis    Arteriosclerosis of coronary artery    Essential hypertension    Type 2 diabetes mellitus with circulatory disorder, without long-term current use of insulin    Hyperlipidemia    Charcot-Davida-Tooth disease-like deformity of foot    Fall    S/P CABG x 2    KILLIAN (acute kidney injury)    Chronic diastolic (congestive) heart failure    Stage 3b chronic kidney disease    Chronic anticoagulation    Persistent atrial fibrillation    Closed displaced fracture of left clavicle    Pneumonia due to infectious organism    ATN (acute tubular necrosis)    Contusion of left knee    Acute osteomyelitis of right foot    Diabetic foot infection    MRSA (methicillin resistant Staphylococcus aureus) infection    Acute kidney injury         KILLIAN: likely ATN related to rhabdomyolysis, prerenal insult related to diuretics etc.  Needing RRT since 3/11/2025. Reason for GFR decline likely multifactorial, some ATN, ongoing  osteomyelitis etc.   RIJ TDC placed 3/11/2025.  Lower extremity osteomyelitis/cellulitis  CKD stage III-IV: Baseline creatinine  1.8-2.6 mg/dL with baseline GFR around 25 mL/min  Acute urinary retention requiring Johnson catheter placement.  Clavicle fracture  A-fib with controlled rates.  History of HFpEF with pulmonary hypertension: Slightly volume expanded.  Severe anemia in CKD:          PLAN:                                                                            Plan for first HD treatment today.  Additional HD tomorrow.  Podiatry following for foot osteomyelitis.  Small pneumothorax being followed by vascular surgery.  Antibiotics dosed for  CrCl<15 mL/min.  Follow-up with ID.  Continue EPO with HD.  Will require PRBCs today.      Tiesha Mccrary MD  [facility]  3/12/2025  11:09 EDT

## 2025-03-12 NOTE — NURSING NOTE
Arrived on unit to initiate dialysis. Upon checking placement of new TDC, noted pneumothorax. Clarified dialysis orders, verbal order per Dr. Mccrary to initiate dialysis in am, 3/12, post chest xray.

## 2025-03-12 NOTE — PROGRESS NOTES
[facility]  Cardiology Progress Note    Patient Identification:  Name: Rock Maciel Jr.  Age: 79 y.o.  Sex: male  :  1945  MRN: [id number]                 Follow Up / Chief Complaint:  follow up preop clearance    Interval History: resting in bed, no chest pain or shortness of breath, just feels weak and tired this morning, said he was taken to surgery but it was cancelled due to low hgb.     Objective:    Past Medical History:  Past Medical History:   Diagnosis Date    Allergic rhinitis     Bronchitis     Coronary artery disease     Diabetes mellitus     DM (diabetes mellitus)     Encounter for annual health examination 2014    Annual Health Assessment    Gout     Hiatal hernia     History of MRSA infection     RIGHT FOOT     Hyperlipidemia     Hypertension     Murmur     Pneumothorax on right     Sleep apnea     pt wears CPAP at night    Wellness examination 2015    Annual Wellness Visit     Past Surgical History:  Past Surgical History:   Procedure Laterality Date    ARTERY SURGERY Bilateral     carotid    BONE EXCISION LEG Right 3/10/2025    Procedure: BONE EXCISION LOWER EXTERMITY, RIGHT;  Surgeon: Jimenez Mchugh DPM;  Location: [facility] OR;  Service: Podiatry;  Laterality: Right;    CARDIAC CATHETERIZATION N/A 2018    Procedure: Left Heart Cath;  Surgeon: Jo Toney MD;  Location: [facility] CATH INVASIVE LOCATION;  Service: Cardiovascular    CARDIAC CATHETERIZATION N/A 2018    Procedure: Coronary angiography;  Surgeon: Jo Toney MD;  Location: [facility] CATH INVASIVE LOCATION;  Service: Cardiovascular    CAROTID ENDARTERECTOMY Bilateral     COLONOSCOPY      CORONARY ARTERY BYPASS GRAFT WITH AORTIC VALVE REPAIR/REPLACEMENT N/A 2018    Procedure: INTRAOPERATIVE ALEX, MIDLINE STERNOTOMY, CORONARY ARTERY BYPASS GRAFTING X 3 USING ENDOSCOPICALLY HARVESTED LEFT GREATER SAPHENOUS VEIN,  AORTIC VALVE REPLACEMENT USING 25MM LOPEZ  II ULTRA PORCINE VALVE, PRP;  Surgeon: Phong Posey MD;  Location: [facility] OR;  Service: Cardiothoracic    FOOT SURGERY Right 2010    5th digit removal    FOOT SURGERY Left 2011    1 digit removed    INCISION AND DRAINAGE LEG Right 3/28/2020    Procedure: DEBRIDMENT OF RIGHT CALF;  Surgeon: Ross Pineda MD;  Location: [facility] MAIN OR;  Service: Vascular;  Laterality: Right;    INCISION AND DRAINAGE LEG Right 3/10/2025    Procedure: INCISION AND DRAINAGE LOWER EXTREMITY;  Surgeon: Jimenez Mchugh DPM;  Location: [facility] OR;  Service: Podiatry;  Laterality: Right;    INGUINAL HERNIA REPAIR Left 11/29/2024    Procedure: INGUINAL HERNIA REPAIR LAPAROSCOPIC WITH DAVINCI ROBOT;  Surgeon: Phong Lazo MD;  Location: [facility] OR;  Service: Robotics - DaVinci;  Laterality: Left;    INSERTION HEMODIALYSIS CATHETER N/A 3/11/2025    Procedure: HEMODIALYSIS CATHETER INSERTION;  Surgeon: Jeaneth Bonds MD;  Location: [facility] OR;  Service: Vascular;  Laterality: N/A;    QUADRICEPS TENDON REPAIR Left 5/14/2019    Procedure: Left QUADRICEPS TENDON REPAIR;  Surgeon: Camilo Hunter MD;  Location: [facility] OR;  Service: Orthopedics    THORACENTESIS Right 11/21/2016    THORACOSCOPY Right 5/8/2017    Procedure: BRONCHOSCOPY, RIGHT VAT,  TOTAL DECORTICATION RIGHT LUNG, PLEURAL BX, PLACEMENT SUBPLEURAL PAIN CAATHETERS X2;  Surgeon: Donald Orlando III, MD;  Location: [facility];  Service:     TONSILECTOMY, ADENOIDECTOMY, BILATERAL MYRINGOTOMY AND TUBES          Social History:   Social History     Tobacco Use    Smoking status: Never     Passive exposure: Never    Smokeless tobacco: Never   Substance Use Topics    Alcohol use: Yes     Alcohol/week: 7.0 standard drinks of alcohol     Types: 7 Drinks containing 0.5 oz of alcohol per week     Comment: either one glass wine or one glass liquor per  day      Family History:  Family History   Problem Relation Age of Onset     Hypertension Father     Malig Hyperthermia Neg Hx           Allergies:  Allergies   Allergen Reactions    Ceclor [Cefaclor] Rash     Rash in his 50s; he tolerated piperacillin-tazobactam in March 2020     Scheduled Meds:  amiodarone, 200 mg, Daily  [Held by provider] apixaban, 2.5 mg, BID  vitamin C, 250 mg, Daily  atorvastatin, 20 mg, Nightly  bumetanide, 2 mg, BID Diuretics  cholecalciferol, 1,000 Units, Daily  [Held by provider] clopidogrel, 75 mg, Daily  DAPTOmycin, 8 mg/kg (Adjusted), Q48H  epoetin vince/vince-epbx, 10,000 Units, Once per day on Monday Wednesday Friday  hydrALAZINE, 10 mg, TID  insulin glargine, 5 Units, Nightly  insulin lispro, 2-9 Units, 4x Daily AC & at Bedtime  insulin lispro, 6 Units, TID With Meals  linagliptin, 5 mg, Daily  melatonin, 2.5 mg, Nightly  Menthol-Zinc Oxide, 1 Application, Q12H  metoprolol succinate XL, 25 mg, Daily  pantoprazole, 40 mg, Q AM  piperacillin-tazobactam, 4.5 g, Q12H  sodium chloride, 3 mL, Q12H  tamsulosin, 0.4 mg, Daily            INTAKE AND OUTPUT:    Intake/Output Summary (Last 24 hours) at 3/12/2025 1147  Last data filed at 3/11/2025 1753  Gross per 24 hour   Intake 200 ml   Output 100 ml   Net 100 ml       Review of Systems:   GI: no n/v or abd pain  Cardiac: no chest pain or palpitations  Pulmonary: no shortness of breath or cough      Constitutional:  Temp:  [97.2 °F (36.2 °C)-98.2 °F (36.8 °C)] 97.3 °F (36.3 °C)  Heart Rate:  [57-69] 64  Resp:  [16-21] 18  BP: (135-179)/(46-67) 145/56    Physical Exam:  General:  Alert, cooperative, appears in no acute distress  Respiratory: Clear to auscultation.  Normal respiratory effort and rate.  Cardiovascular: S1S2 Regular rate and rhythm. No murmur, rub or gallop.   Gastrointestinal: soft, non tender. Bowel sounds present.   Extremities: MARSHALL x4. No pretibial pitting edema. Adequate musculoskeletal strength.   Neuro: AAO x3 CN II-XII grossly intact            Cardiographics    Echocardiogram:  "  10/2024  Interpretation Summary         Left ventricular ejection fraction appears to be 61 - 65%.    Left ventricular diastolic function was indeterminate.    The left atrial cavity is moderately dilated.    Left atrial volume is moderately increased.    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    Estimated right ventricular systolic pressure from tricuspid regurgitation is moderately elevated (45-55 mmHg).     Lab Review   Results from last 7 days   Lab Units 03/11/25  0518 03/10/25  0529 03/09/25  0714   CK TOTAL U/L 17* 20 39         Results from last 7 days   Lab Units 03/12/25  0727   SODIUM mmol/L 134*   POTASSIUM mmol/L 5.2   BUN mg/dL 111*   CREATININE mg/dL 7.39*   CALCIUM mg/dL 8.2*     @[id number]@  Results from last 7 days   Lab Units 03/12/25  0727 03/11/25  0518 03/09/25  0714   WBC 10*3/mm3 9.14 9.23 9.95   HEMOGLOBIN g/dL 6.8* 7.0* 7.6*   HEMATOCRIT % 21.4* 22.5* 24.7*   PLATELETS 10*3/mm3 274 263 261               Assessment:  Preop clearance for foot surgery  Atrial fibrillation s/p cardioversion: SR. QTC stable. A/c on hold.   Chronic amiodarone therapy  Chronic anticoagulation  History of CAD    Plan:  BP and hr stable  No chest pain  PRBC transfusion planned with HD today  Cardiac stable, No change from cardiac standpoint      )3/12/2025  ZEINA Loredo/Transcription:   \"Dictated utilizing Dragon dictation\".     "

## 2025-03-12 NOTE — NURSING NOTE
Initial hd tx completed. Stable run. Cath performance ideal. 1 liter net uf. Post vss 152/59 hr 65

## 2025-03-12 NOTE — PROGRESS NOTES
Infectious Diseases Progress Note    Nicolasa Denny MD     [facility]  Los: 9 days  Patient Identification:  Name: Rock Maciel Jr.  Age: 79 y.o.  Sex: male  :  1945  MRN: [id number]         Primary Care Physician: Denise Dickson, ZEINA        Subjective: Overall feeling better than yesterday.  Denies any shortness of breath.  Interval History: See consultation note.    Objective:    Scheduled Meds:amiodarone, 200 mg, Oral, Daily  [Held by provider] apixaban, 2.5 mg, Oral, BID  vitamin C, 250 mg, Oral, Daily  atorvastatin, 20 mg, Oral, Nightly  bumetanide, 2 mg, Oral, BID Diuretics  cholecalciferol, 1,000 Units, Oral, Daily  [Held by provider] clopidogrel, 75 mg, Oral, Daily  DAPTOmycin, 8 mg/kg (Adjusted), Intravenous, Q48H  epoetin vnice/vince-epbx, 10,000 Units, Intravenous, Once per day on   hydrALAZINE, 10 mg, Oral, TID  insulin glargine, 5 Units, Subcutaneous, Nightly  insulin lispro, 2-9 Units, Subcutaneous, 4x Daily AC & at Bedtime  insulin lispro, 6 Units, Subcutaneous, TID With Meals  linagliptin, 5 mg, Oral, Daily  melatonin, 2.5 mg, Oral, Nightly  Menthol-Zinc Oxide, 1 Application, Topical, Q12H  metoprolol succinate XL, 25 mg, Oral, Daily  pantoprazole, 40 mg, Oral, Q AM  piperacillin-tazobactam, 4.5 g, Intravenous, Q12H  sodium chloride, 3 mL, Intravenous, Q12H  tamsulosin, 0.4 mg, Oral, Daily      Continuous Infusions:lactated ringers, 9 mL/hr, Last Rate: Stopped (25 0944)  Pharmacy Consult - Pharmacy to dose,         Vital signs in last 24 hours:  Temp:  [97.2 °F (36.2 °C)-98.2 °F (36.8 °C)] 97.3 °F (36.3 °C)  Heart Rate:  [57-69] 64  Resp:  [16-21] 18  BP: (135-179)/(46-67) 145/56    Intake/Output:    Intake/Output Summary (Last 24 hours) at 3/12/2025 1000  Last data filed at 3/11/2025 1753  Gross per 24 hour   Intake 200 ml   Output 100 ml   Net 100 ml       Exam:  /56 (BP Location: Right arm, Patient Position: Lying)   Pulse 64   Temp  "97.3 °F (36.3 °C) (Oral)   Resp 18   Ht 185.4 cm (73\")   Wt 127 kg (280 lb 10.3 oz)   SpO2 98%   BMI 37.03 kg/m²   Patient is examined using the personal protective equipment as per guidelines from infection control for this particular patient as enacted.  Hand washing was performed before and after patient interaction.  General Appearance:  Lethargic but interactive                          Head:    Normocephalic, without obvious abnormality, atraumatic                           Eyes:    PERRL, pale conjunctiva                         Throat:   Lips, tongue, gums normal; oral mucosa pink and moist                           Neck:   Supple, symmetrical, trachea midline, no JVD                         Lungs:    Clear to auscultation bilaterally, respirations unlabored                 Chest Wall:    No tenderness or deformity left clavicular tenderness noted                          Heart:  S1-S2 irregular                  Abdomen:   Soft nontender                 Extremities: Right foot dressed after incision and drainage of multiple compartments and partial resection of the fourth metatarsal.                  Neurologic: Alert and oriented x 3       Data Review:    I reviewed the patient's new clinical results.  Results from last 7 days   Lab Units 03/12/25  0727 03/11/25  0518 03/09/25  0714 03/08/25  0403 03/07/25  0449 03/06/25  0508   WBC 10*3/mm3 9.14 9.23 9.95 11.08* 13.94* 15.51*   HEMOGLOBIN g/dL 6.8* 7.0* 7.6* 7.3* 7.3* 7.0*   PLATELETS 10*3/mm3 274 263 261 238 215 193     Results from last 7 days   Lab Units 03/12/25  0727 03/11/25  0518 03/10/25  0529 03/09/25  0714 03/08/25  0403 03/07/25  0449 03/06/25  0508   SODIUM mmol/L 134* 136 134* 138 138 138 135*   POTASSIUM mmol/L 5.2 4.9 4.5 4.3 4.1 3.9 4.1   CHLORIDE mmol/L 99 100 100 101 103 102 101   CO2 mmol/L 20.7* 21.0* 19.8* 23.0 22.5 23.0 24.3   BUN mg/dL 111* 107* 91* 89* 76* 69* 67*   CREATININE mg/dL 7.39* 6.67* 5.85* 5.05* 4.22* 3.97* 3.85* "   CALCIUM mg/dL 8.2* 8.0* 7.8* 8.1* 8.1* 8.0* 8.0*   GLUCOSE mg/dL 152* 146* 131* 131* 119* 149* 147*     Microbiology Results (last 10 days)       Procedure Component Value - Date/Time    Wound Culture - Wound, Foot, Right [[id number]]  (Abnormal) Collected: 03/10/25 0909    Lab Status: Preliminary result Specimen: Wound from Foot, Right Updated: 03/12/25 0942     Wound Culture Scant growth (1+) Staphylococcus aureus, MRSA     Comment:   Methicillin resistant Staphylococcus aureus, Patient may be an isolation risk.        Gram Stain Rare (1+) Gram positive cocci      Rare (1+) WBCs seen    Narrative:      Organism under investigation.      Wound Culture - Wound, Foot, Right [[id number]]  (Abnormal)  (Susceptibility) Collected: 03/06/25 1620    Lab Status: Edited Result - FINAL Specimen: Wound from Foot, Right Updated: 03/11/25 0837     Wound Culture Moderate growth (3+) Staphylococcus aureus, MRSA     Comment:   Methicillin resistant Staphylococcus aureus, Patient may be an isolation risk.         Scant growth (1+) Candida albicans      Light growth (2+) Normal Skin Ashlye     Gram Stain Moderate (3+) WBCs per low power field      Few (2+) Gram positive cocci in pairs and clusters      Few (2+) Gram positive bacilli    Susceptibility        Staphylococcus aureus, MRSA      ABBIE      Clindamycin Susceptible      Daptomycin Susceptible (C)  [1]       Erythromycin Resistant      Oxacillin Resistant      Rifampin Susceptible      Tetracycline Susceptible      Trimethoprim + Sulfamethoxazole Resistant      Vancomycin Susceptible                   [1]  Appended report. These results have been appended to a previously final verified report.                Susceptibility Comments       Staphylococcus aureus, MRSA    Daptomycin released per Eduardo in Pharmacy               Legionella Antigen, Urine - Urine, Urine, Clean Catch [[id number]]  (Normal) Collected: 03/06/25 1143    Lab Status: Final result Specimen: Urine, Clean Catch  Updated: 03/06/25 1252     LEGIONELLA ANTIGEN, URINE Negative    S. Pneumo Ag Urine or CSF - Urine, Urine, Clean Catch [[id number]]  (Normal) Collected: 03/06/25 1143    Lab Status: Final result Specimen: Urine, Clean Catch Updated: 03/06/25 1252     Strep Pneumo Ag Negative    MRSA Screen, PCR (Inpatient) - Swab, Nares [[id number]]  (Normal) Collected: 03/03/25 2220    Lab Status: Final result Specimen: Swab from Nares Updated: 03/04/25 0013     MRSA PCR No MRSA Detected    Narrative:      The negative predictive value of this diagnostic test is high and should only be used to consider de-escalating anti-MRSA therapy. A positive result may indicate colonization with MRSA and must be correlated clinically.    Blood Culture - Blood, Arm, Left [[id number]]  (Normal) Collected: 03/03/25 1747    Lab Status: Final result Specimen: Blood from Arm, Left Updated: 03/08/25 1815     Blood Culture No growth at 5 days    Blood Culture - Blood, Hand, Right [[id number]]  (Normal) Collected: 03/03/25 1745    Lab Status: Final result Specimen: Blood from Hand, Right Updated: 03/08/25 1815     Blood Culture No growth at 5 days              Assessment:    Traumatic rhabdomyolysis    Arteriosclerosis of coronary artery    Essential hypertension    Type 2 diabetes mellitus with circulatory disorder, without long-term current use of insulin    Hyperlipidemia    Charcot-Davida-Tooth disease-like deformity of foot    Fall    S/P CABG x 2    KILLIAN (acute kidney injury)    Chronic diastolic (congestive) heart failure    Stage 3b chronic kidney disease    Chronic anticoagulation    Persistent atrial fibrillation    Closed displaced fracture of left clavicle    Pneumonia due to infectious organism    ATN (acute tubular necrosis)    Contusion of left knee    Acute osteomyelitis of right foot    Diabetic foot infection    MRSA (methicillin resistant Staphylococcus aureus) infection    Acute kidney injury  1-probable systemic illness due to  2-evolving  ongoing infection with abscess of the right foot resulting in generalized weakness and  3-subsequent fall and fracture of left clavicle and left knee discomfort  4-diabetes mellitus  5-history of gout  6-sleep apnea  7-obesity  8-hypertension  9-severe anemia  10-acute on chronic renal failure - ESRD to start dialysis -3/12/2025  11-prior multiple diabetic foot infections and surgeries.  12-status post right IJ tunneled catheter with small post op pneumothorax     Recommendations/Discussions:  See my discussion and recommendations on 3/7/2025.  Continue with Zosyn and  daptomycin, avoid concomitant use of statin while he is taking daptomycin, asked microbiology for sensitivity of the Staph aureus/MRSA for daptomycin and monitor mild toxicity with periodic CPK levels-given the Gram stain and if there is no evidence of resistant pathogen then antibiotic therapy can be de-escalated to Unasyn.  Follow-up on operative culture results and depending upon operative findings and final surgery and subsequent concern for residual osteomyelitis final antibiotic regimen in terms of composition and duration would be recommended.  Nicolasa Denny MD  3/12/2025  10:00 EDT    Parts of this note may be an electronic transcription/translation of spoken language to printed text using the Dragon dictation system.

## 2025-03-12 NOTE — ANESTHESIA PREPROCEDURE EVALUATION
Anesthesia Evaluation     Patient summary reviewed   no history of anesthetic complications:   NPO Solid Status: > 8 hours  NPO Liquid Status: > 2 hours           Airway   Mallampati: III  TM distance: >3 FB  Neck ROM: full  No difficulty expected  Dental - normal exam     Pulmonary     breath sounds clear to auscultation  (+) pneumonia resolved , pleural effusion,sleep apnea on CPAP  (-) shortness of breath, recent URI, not a smoker    ROS comment: 3/12 CXR - FINDINGS: Right-sided central venous catheter appears stable. Sternotomy  wires are noted. Heart is enlarged. Pulmonary vasculature is congested.  Bilateral pulmonary opacities appear similar to prior exam. Small left  pleural effusion is apparent. No pneumothorax. No acute osseous process.     Cardiovascular     ECG reviewed  PT is on anticoagulation therapy  Patient on routine beta blocker and Beta blocker not taken-may be given intraoperatively  Rate: normal    (+) hypertension, valvular problems/murmurs AS, CAD, CABG >6 Months, dysrhythmias Paroxysmal Atrial Fib, CHF , hyperlipidemia,  carotid artery disease  (-) past MI, angina    ROS comment: RBBB, ectopic atrial rhythm    Neuro/Psych  (-) seizures, CVA  GI/Hepatic/Renal/Endo    (+) morbid obesity, hiatal hernia, renal disease (Cr 7.39)- ESRD, CRI and ARF, diabetes mellitus type 2    Musculoskeletal     (-) neck stiffness  Abdominal    Substance History      OB/GYN          Other   blood dyscrasia (Hb 7.7, improved from 6.8) anemia,     ROS/Med Hx Other: Last eliquis 3/7                  Anesthesia Plan    ASA 4     MAC     (Pt cx'd yest, received pRBCs w/ improvement.    MAC anesthesia discussed with patient and/or patient representative. Risks (including but not limited to intra-op awareness), benefits, and alternatives were discussed. Understanding was voiced with an agreement to proceed with a MAC technique and General as a backup option. )    Anesthetic plan, risks, benefits, and alternatives have  been provided, discussed and informed consent has been obtained with: patient.      CODE STATUS:    Code Status (Patient has no pulse and is not breathing): CPR (Attempt to Resuscitate)  Medical Interventions (Patient has pulse or is breathing): Full Support  Level Of Support Discussed With: Patient

## 2025-03-13 LAB
ANION GAP SERPL CALCULATED.3IONS-SCNC: 16 MMOL/L (ref 5–15)
BACTERIA SPEC AEROBE CULT: ABNORMAL
BH BB BLOOD EXPIRATION DATE: NORMAL
BH BB BLOOD TYPE BARCODE: 5100
BH BB DISPENSE STATUS: NORMAL
BH BB PRODUCT CODE: NORMAL
BH BB UNIT NUMBER: NORMAL
BUN SERPL-MCNC: 84 MG/DL (ref 8–23)
BUN/CREAT SERPL: 12.7 (ref 7–25)
CALCIUM SPEC-SCNC: 7.8 MG/DL (ref 8.6–10.5)
CHLORIDE SERPL-SCNC: 103 MMOL/L (ref 98–107)
CO2 SERPL-SCNC: 20 MMOL/L (ref 22–29)
CREAT SERPL-MCNC: 6.59 MG/DL (ref 0.76–1.27)
CROSSMATCH INTERPRETATION: NORMAL
DEPRECATED RDW RBC AUTO: 52.3 FL (ref 37–54)
EGFRCR SERPLBLD CKD-EPI 2021: 7.9 ML/MIN/1.73
ERYTHROCYTE [DISTWIDTH] IN BLOOD BY AUTOMATED COUNT: 15.7 % (ref 12.3–15.4)
GLUCOSE BLDC GLUCOMTR-MCNC: 135 MG/DL (ref 70–130)
GLUCOSE BLDC GLUCOMTR-MCNC: 139 MG/DL (ref 70–130)
GLUCOSE BLDC GLUCOMTR-MCNC: 162 MG/DL (ref 70–130)
GLUCOSE BLDC GLUCOMTR-MCNC: 181 MG/DL (ref 70–130)
GLUCOSE SERPL-MCNC: 130 MG/DL (ref 65–99)
GRAM STN SPEC: ABNORMAL
GRAM STN SPEC: ABNORMAL
HBV CORE AB SERPL QL IA: NEGATIVE
HCT VFR BLD AUTO: 24.4 % (ref 37.5–51)
HGB BLD-MCNC: 7.7 G/DL (ref 13–17.7)
MCH RBC QN AUTO: 28.6 PG (ref 26.6–33)
MCHC RBC AUTO-ENTMCNC: 31.6 G/DL (ref 31.5–35.7)
MCV RBC AUTO: 90.7 FL (ref 79–97)
PLATELET # BLD AUTO: 292 10*3/MM3 (ref 140–450)
PMV BLD AUTO: 9.7 FL (ref 6–12)
POTASSIUM SERPL-SCNC: 4.5 MMOL/L (ref 3.5–5.2)
RBC # BLD AUTO: 2.69 10*6/MM3 (ref 4.14–5.8)
SODIUM SERPL-SCNC: 139 MMOL/L (ref 136–145)
UNIT  ABO: NORMAL
UNIT  RH: NORMAL
WBC NRBC COR # BLD AUTO: 8.65 10*3/MM3 (ref 3.4–10.8)

## 2025-03-13 PROCEDURE — 25010000002 FENTANYL CITRATE (PF) 50 MCG/ML SOLUTION

## 2025-03-13 PROCEDURE — 25010000002 PIPERACILLIN SOD-TAZOBACTAM PER 1 G: Performed by: INTERNAL MEDICINE

## 2025-03-13 PROCEDURE — 25010000002 LIDOCAINE 1 % SOLUTION: Performed by: PODIATRIST

## 2025-03-13 PROCEDURE — 0KBV0ZZ EXCISION OF RIGHT FOOT MUSCLE, OPEN APPROACH: ICD-10-PCS | Performed by: PODIATRIST

## 2025-03-13 PROCEDURE — 63710000001 INSULIN LISPRO (HUMAN) PER 5 UNITS: Performed by: PODIATRIST

## 2025-03-13 PROCEDURE — 63710000001 INSULIN GLARGINE PER 5 UNITS: Performed by: PODIATRIST

## 2025-03-13 PROCEDURE — 85027 COMPLETE CBC AUTOMATED: CPT | Performed by: STUDENT IN AN ORGANIZED HEALTH CARE EDUCATION/TRAINING PROGRAM

## 2025-03-13 PROCEDURE — 99232 SBSQ HOSP IP/OBS MODERATE 35: CPT | Performed by: NURSE PRACTITIONER

## 2025-03-13 PROCEDURE — 25010000002 DAPTOMYCIN PER 1 MG: Performed by: INTERNAL MEDICINE

## 2025-03-13 PROCEDURE — 82948 REAGENT STRIP/BLOOD GLUCOSE: CPT

## 2025-03-13 PROCEDURE — 80048 BASIC METABOLIC PNL TOTAL CA: CPT | Performed by: STUDENT IN AN ORGANIZED HEALTH CARE EDUCATION/TRAINING PROGRAM

## 2025-03-13 PROCEDURE — 25010000002 AMPICILLIN-SULBACTAM PER 1.5 G: Performed by: INTERNAL MEDICINE

## 2025-03-13 PROCEDURE — 25010000002 PROPOFOL 10 MG/ML EMULSION

## 2025-03-13 PROCEDURE — 25010000002 LIDOCAINE 2% SOLUTION

## 2025-03-13 RX ORDER — HYDROCODONE BITARTRATE AND ACETAMINOPHEN 7.5; 325 MG/1; MG/1
1 TABLET ORAL EVERY 4 HOURS PRN
Status: DISCONTINUED | OUTPATIENT
Start: 2025-03-13 | End: 2025-03-13 | Stop reason: HOSPADM

## 2025-03-13 RX ORDER — PROPOFOL 10 MG/ML
VIAL (ML) INTRAVENOUS AS NEEDED
Status: DISCONTINUED | OUTPATIENT
Start: 2025-03-13 | End: 2025-03-13 | Stop reason: SURG

## 2025-03-13 RX ORDER — LIDOCAINE HYDROCHLORIDE 10 MG/ML
INJECTION, SOLUTION INFILTRATION; PERINEURAL AS NEEDED
Status: DISCONTINUED | OUTPATIENT
Start: 2025-03-13 | End: 2025-03-13 | Stop reason: HOSPADM

## 2025-03-13 RX ORDER — HYDROCODONE BITARTRATE AND ACETAMINOPHEN 5; 325 MG/1; MG/1
1 TABLET ORAL ONCE AS NEEDED
Status: DISCONTINUED | OUTPATIENT
Start: 2025-03-13 | End: 2025-03-13 | Stop reason: HOSPADM

## 2025-03-13 RX ORDER — LIDOCAINE HYDROCHLORIDE 20 MG/ML
INJECTION, SOLUTION INFILTRATION; PERINEURAL AS NEEDED
Status: DISCONTINUED | OUTPATIENT
Start: 2025-03-13 | End: 2025-03-13 | Stop reason: SURG

## 2025-03-13 RX ORDER — PROMETHAZINE HYDROCHLORIDE 25 MG/1
25 TABLET ORAL ONCE AS NEEDED
Status: DISCONTINUED | OUTPATIENT
Start: 2025-03-13 | End: 2025-03-13 | Stop reason: HOSPADM

## 2025-03-13 RX ORDER — DROPERIDOL 2.5 MG/ML
0.62 INJECTION, SOLUTION INTRAMUSCULAR; INTRAVENOUS
Status: DISCONTINUED | OUTPATIENT
Start: 2025-03-13 | End: 2025-03-13 | Stop reason: HOSPADM

## 2025-03-13 RX ORDER — ONDANSETRON 2 MG/ML
4 INJECTION INTRAMUSCULAR; INTRAVENOUS ONCE AS NEEDED
Status: DISCONTINUED | OUTPATIENT
Start: 2025-03-13 | End: 2025-03-13 | Stop reason: HOSPADM

## 2025-03-13 RX ORDER — PROMETHAZINE HYDROCHLORIDE 25 MG/1
25 SUPPOSITORY RECTAL ONCE AS NEEDED
Status: DISCONTINUED | OUTPATIENT
Start: 2025-03-13 | End: 2025-03-13 | Stop reason: HOSPADM

## 2025-03-13 RX ORDER — DIPHENHYDRAMINE HYDROCHLORIDE 50 MG/ML
12.5 INJECTION, SOLUTION INTRAMUSCULAR; INTRAVENOUS
Status: DISCONTINUED | OUTPATIENT
Start: 2025-03-13 | End: 2025-03-13 | Stop reason: HOSPADM

## 2025-03-13 RX ORDER — IBUPROFEN 600 MG/1
1 TABLET ORAL
Status: DISCONTINUED | OUTPATIENT
Start: 2025-03-13 | End: 2025-03-13 | Stop reason: HOSPADM

## 2025-03-13 RX ORDER — ATROPINE SULFATE 0.4 MG/ML
0.4 INJECTION, SOLUTION INTRAMUSCULAR; INTRAVENOUS; SUBCUTANEOUS ONCE AS NEEDED
Status: DISCONTINUED | OUTPATIENT
Start: 2025-03-13 | End: 2025-03-13 | Stop reason: HOSPADM

## 2025-03-13 RX ORDER — FENTANYL CITRATE 50 UG/ML
50 INJECTION, SOLUTION INTRAMUSCULAR; INTRAVENOUS
Status: DISCONTINUED | OUTPATIENT
Start: 2025-03-13 | End: 2025-03-13 | Stop reason: HOSPADM

## 2025-03-13 RX ORDER — IPRATROPIUM BROMIDE AND ALBUTEROL SULFATE 2.5; .5 MG/3ML; MG/3ML
3 SOLUTION RESPIRATORY (INHALATION) ONCE AS NEEDED
Status: DISCONTINUED | OUTPATIENT
Start: 2025-03-13 | End: 2025-03-13 | Stop reason: HOSPADM

## 2025-03-13 RX ORDER — FENTANYL CITRATE 50 UG/ML
25 INJECTION, SOLUTION INTRAMUSCULAR; INTRAVENOUS
Status: DISCONTINUED | OUTPATIENT
Start: 2025-03-13 | End: 2025-03-13 | Stop reason: HOSPADM

## 2025-03-13 RX ORDER — OXYCODONE AND ACETAMINOPHEN 7.5; 325 MG/1; MG/1
1 TABLET ORAL EVERY 4 HOURS PRN
Status: DISCONTINUED | OUTPATIENT
Start: 2025-03-13 | End: 2025-03-13 | Stop reason: HOSPADM

## 2025-03-13 RX ORDER — NICOTINE POLACRILEX 4 MG
15 LOZENGE BUCCAL
Status: DISCONTINUED | OUTPATIENT
Start: 2025-03-13 | End: 2025-03-13 | Stop reason: HOSPADM

## 2025-03-13 RX ORDER — LABETALOL HYDROCHLORIDE 5 MG/ML
5 INJECTION, SOLUTION INTRAVENOUS
Status: DISCONTINUED | OUTPATIENT
Start: 2025-03-13 | End: 2025-03-13 | Stop reason: HOSPADM

## 2025-03-13 RX ORDER — FLUMAZENIL 0.1 MG/ML
0.2 INJECTION INTRAVENOUS AS NEEDED
Status: DISCONTINUED | OUTPATIENT
Start: 2025-03-13 | End: 2025-03-13 | Stop reason: HOSPADM

## 2025-03-13 RX ORDER — HYDRALAZINE HYDROCHLORIDE 20 MG/ML
5 INJECTION INTRAMUSCULAR; INTRAVENOUS
Status: DISCONTINUED | OUTPATIENT
Start: 2025-03-13 | End: 2025-03-13 | Stop reason: HOSPADM

## 2025-03-13 RX ORDER — MAGNESIUM HYDROXIDE 1200 MG/15ML
LIQUID ORAL AS NEEDED
Status: DISCONTINUED | OUTPATIENT
Start: 2025-03-13 | End: 2025-03-13 | Stop reason: HOSPADM

## 2025-03-13 RX ORDER — DEXTROSE MONOHYDRATE 25 G/50ML
25 INJECTION, SOLUTION INTRAVENOUS
Status: DISCONTINUED | OUTPATIENT
Start: 2025-03-13 | End: 2025-03-13 | Stop reason: HOSPADM

## 2025-03-13 RX ORDER — EPHEDRINE SULFATE 50 MG/ML
5 INJECTION, SOLUTION INTRAVENOUS ONCE AS NEEDED
Status: DISCONTINUED | OUTPATIENT
Start: 2025-03-13 | End: 2025-03-13 | Stop reason: HOSPADM

## 2025-03-13 RX ORDER — HYDROMORPHONE HYDROCHLORIDE 1 MG/ML
0.25 INJECTION, SOLUTION INTRAMUSCULAR; INTRAVENOUS; SUBCUTANEOUS
Status: DISCONTINUED | OUTPATIENT
Start: 2025-03-13 | End: 2025-03-13 | Stop reason: HOSPADM

## 2025-03-13 RX ORDER — NALOXONE HCL 0.4 MG/ML
0.2 VIAL (ML) INJECTION AS NEEDED
Status: DISCONTINUED | OUTPATIENT
Start: 2025-03-13 | End: 2025-03-13 | Stop reason: HOSPADM

## 2025-03-13 RX ORDER — HYDROMORPHONE HYDROCHLORIDE 1 MG/ML
0.5 INJECTION, SOLUTION INTRAMUSCULAR; INTRAVENOUS; SUBCUTANEOUS
Status: DISCONTINUED | OUTPATIENT
Start: 2025-03-13 | End: 2025-03-13 | Stop reason: HOSPADM

## 2025-03-13 RX ORDER — FENTANYL CITRATE 50 UG/ML
INJECTION, SOLUTION INTRAMUSCULAR; INTRAVENOUS AS NEEDED
Status: DISCONTINUED | OUTPATIENT
Start: 2025-03-13 | End: 2025-03-13 | Stop reason: SURG

## 2025-03-13 RX ORDER — FENTANYL CITRATE 50 UG/ML
INJECTION, SOLUTION INTRAMUSCULAR; INTRAVENOUS AS NEEDED
Status: DISCONTINUED | OUTPATIENT
Start: 2025-03-11 | End: 2025-03-13 | Stop reason: SURG

## 2025-03-13 RX ADMIN — INSULIN LISPRO 6 UNITS: 100 INJECTION, SOLUTION INTRAVENOUS; SUBCUTANEOUS at 11:19

## 2025-03-13 RX ADMIN — PIPERACILLIN AND TAZOBACTAM 4.5 G: 4; .5 INJECTION, POWDER, FOR SOLUTION INTRAVENOUS at 05:20

## 2025-03-13 RX ADMIN — INSULIN LISPRO 2 UNITS: 100 INJECTION, SOLUTION INTRAVENOUS; SUBCUTANEOUS at 20:48

## 2025-03-13 RX ADMIN — HYDRALAZINE HYDROCHLORIDE 10 MG: 10 TABLET ORAL at 17:00

## 2025-03-13 RX ADMIN — BUMETANIDE 2 MG: 1 TABLET ORAL at 17:45

## 2025-03-13 RX ADMIN — Medication 1000 UNITS: at 09:46

## 2025-03-13 RX ADMIN — LINAGLIPTIN 5 MG: 5 TABLET, FILM COATED ORAL at 12:38

## 2025-03-13 RX ADMIN — PROPOFOL 20 MG: 10 INJECTION, EMULSION INTRAVENOUS at 07:52

## 2025-03-13 RX ADMIN — Medication 2.5 MG: at 20:48

## 2025-03-13 RX ADMIN — BUMETANIDE 2 MG: 1 TABLET ORAL at 09:46

## 2025-03-13 RX ADMIN — MENTHOL, ZINC OXIDE 1 APPLICATION: .44; 20.6 OINTMENT TOPICAL at 20:49

## 2025-03-13 RX ADMIN — PIPERACILLIN AND TAZOBACTAM 4.5 G: 4; .5 INJECTION, POWDER, FOR SOLUTION INTRAVENOUS at 07:42

## 2025-03-13 RX ADMIN — Medication 3 ML: at 09:57

## 2025-03-13 RX ADMIN — AMIODARONE HYDROCHLORIDE 200 MG: 200 TABLET ORAL at 09:46

## 2025-03-13 RX ADMIN — PROPOFOL 50 MG: 10 INJECTION, EMULSION INTRAVENOUS at 07:37

## 2025-03-13 RX ADMIN — HYDRALAZINE HYDROCHLORIDE 10 MG: 10 TABLET ORAL at 20:48

## 2025-03-13 RX ADMIN — FENTANYL CITRATE 25 MCG: 50 INJECTION, SOLUTION INTRAMUSCULAR; INTRAVENOUS at 07:46

## 2025-03-13 RX ADMIN — PROPOFOL 20 MG: 10 INJECTION, EMULSION INTRAVENOUS at 07:43

## 2025-03-13 RX ADMIN — PROPOFOL 20 MG: 10 INJECTION, EMULSION INTRAVENOUS at 07:56

## 2025-03-13 RX ADMIN — ATORVASTATIN CALCIUM 20 MG: 20 TABLET, FILM COATED ORAL at 20:48

## 2025-03-13 RX ADMIN — DAPTOMYCIN 800 MG: 500 INJECTION, POWDER, LYOPHILIZED, FOR SOLUTION INTRAVENOUS at 17:00

## 2025-03-13 RX ADMIN — MENTHOL, ZINC OXIDE 1 APPLICATION: .44; 20.6 OINTMENT TOPICAL at 09:56

## 2025-03-13 RX ADMIN — FENTANYL CITRATE 25 MCG: 50 INJECTION, SOLUTION INTRAMUSCULAR; INTRAVENOUS at 07:38

## 2025-03-13 RX ADMIN — LIDOCAINE HYDROCHLORIDE 120 MG: 20 INJECTION, SOLUTION INFILTRATION; PERINEURAL at 07:37

## 2025-03-13 RX ADMIN — Medication 3 ML: at 20:49

## 2025-03-13 RX ADMIN — PROPOFOL 20 MG: 10 INJECTION, EMULSION INTRAVENOUS at 07:48

## 2025-03-13 RX ADMIN — INSULIN LISPRO 2 UNITS: 100 INJECTION, SOLUTION INTRAVENOUS; SUBCUTANEOUS at 11:19

## 2025-03-13 RX ADMIN — METOPROLOL SUCCINATE 25 MG: 25 TABLET, EXTENDED RELEASE ORAL at 09:57

## 2025-03-13 RX ADMIN — SODIUM CHLORIDE 3 G: 9 INJECTION, SOLUTION INTRAVENOUS at 11:00

## 2025-03-13 RX ADMIN — PROPOFOL 20 MG: 10 INJECTION, EMULSION INTRAVENOUS at 07:59

## 2025-03-13 RX ADMIN — INSULIN GLARGINE 5 UNITS: 100 INJECTION, SOLUTION SUBCUTANEOUS at 20:49

## 2025-03-13 RX ADMIN — OXYCODONE HYDROCHLORIDE AND ACETAMINOPHEN 250 MG: 500 TABLET ORAL at 09:46

## 2025-03-13 RX ADMIN — TAMSULOSIN HYDROCHLORIDE 0.4 MG: 0.4 CAPSULE ORAL at 09:45

## 2025-03-13 NOTE — PROGRESS NOTES
"  Infectious Diseases Progress Note    Nicolasa Denny MD     [facility]  Los: 10 days  Patient Identification:  Name: Rock Maciel Jr.  Age: 79 y.o.  Sex: male  :  1945  MRN: [id number]         Primary Care Physician: Denise Dickson, ZEINA        Subjective: In dialysis yesterday tolerated well..  Interval History: See consultation note.    Objective:    Scheduled Meds:amiodarone, 200 mg, Oral, Daily  [Held by provider] apixaban, 2.5 mg, Oral, BID  vitamin C, 250 mg, Oral, Daily  atorvastatin, 20 mg, Oral, Nightly  bumetanide, 2 mg, Oral, BID Diuretics  cholecalciferol, 1,000 Units, Oral, Daily  [Held by provider] clopidogrel, 75 mg, Oral, Daily  DAPTOmycin, 8 mg/kg (Adjusted), Intravenous, Q48H  epoetin vince/vince-epbx, 10,000 Units, Intravenous, Once per day on   hydrALAZINE, 10 mg, Oral, TID  insulin glargine, 5 Units, Subcutaneous, Nightly  insulin lispro, 2-9 Units, Subcutaneous, 4x Daily AC & at Bedtime  insulin lispro, 6 Units, Subcutaneous, TID With Meals  linagliptin, 5 mg, Oral, Daily  melatonin, 2.5 mg, Oral, Nightly  Menthol-Zinc Oxide, 1 Application, Topical, Q12H  metoprolol succinate XL, 25 mg, Oral, Daily  pantoprazole, 40 mg, Oral, Q AM  sodium chloride, 3 mL, Intravenous, Q12H  tamsulosin, 0.4 mg, Oral, Daily      Continuous Infusions:Pharmacy Consult - Pharmacy to dose,         Vital signs in last 24 hours:  Temp:  [97.3 °F (36.3 °C)-99 °F (37.2 °C)] 98.2 °F (36.8 °C)  Heart Rate:  [60-69] 60  Resp:  [14-18] 16  BP: (124-155)/(43-68) 125/52    Intake/Output:    Intake/Output Summary (Last 24 hours) at 3/13/2025 0948  Last data filed at 3/13/2025 0510  Gross per 24 hour   Intake 548.52 ml   Output 1100 ml   Net -551.48 ml       Exam:  /52   Pulse 60   Temp 98.2 °F (36.8 °C) (Oral)   Resp 16   Ht 185.4 cm (73\")   Wt 127 kg (280 lb 10.3 oz)   SpO2 95%   BMI 37.03 kg/m²   Patient is examined using the personal protective equipment as per " guidelines from infection control for this particular patient as enacted.  Hand washing was performed before and after patient interaction.  General Appearance:  Lethargic but interactive                          Head:  Left posterior occipital scalp area laceration dressed.                           Eyes:    PERRL, pale conjunctiva                         Throat:   Lips, tongue, gums normal; oral mucosa pink and moist                           Neck:   Supple, symmetrical, trachea midline, no JVD                         Lungs:    Clear to auscultation bilaterally, respirations unlabored                 Chest Wall:    No tenderness or deformity left clavicular tenderness noted, right IJ tunneled catheter in place.                          Heart:  S1-S2 irregular                  Abdomen:   Soft nontender                 Extremities: Right foot dressed after incision and drainage of multiple compartments and partial resection of the fourth metatarsal.                  Neurologic: Alert and oriented x 3       Data Review:    I reviewed the patient's new clinical results.  Results from last 7 days   Lab Units 03/13/25  0452 03/12/25  0727 03/11/25  0518 03/09/25  0714 03/08/25  0403 03/07/25  0449   WBC 10*3/mm3 8.65 9.14 9.23 9.95 11.08* 13.94*   HEMOGLOBIN g/dL 7.7* 6.8* 7.0* 7.6* 7.3* 7.3*   PLATELETS 10*3/mm3 292 274 263 261 238 215     Results from last 7 days   Lab Units 03/13/25  0452 03/12/25  0727 03/11/25  0518 03/10/25  0529 03/09/25  0714 03/08/25  0403 03/07/25  0449   SODIUM mmol/L 139 134* 136 134* 138 138 138   POTASSIUM mmol/L 4.5 5.2 4.9 4.5 4.3 4.1 3.9   CHLORIDE mmol/L 103 99 100 100 101 103 102   CO2 mmol/L 20.0* 20.7* 21.0* 19.8* 23.0 22.5 23.0   BUN mg/dL 84* 111* 107* 91* 89* 76* 69*   CREATININE mg/dL 6.59* 7.39* 6.67* 5.85* 5.05* 4.22* 3.97*   CALCIUM mg/dL 7.8* 8.2* 8.0* 7.8* 8.1* 8.1* 8.0*   GLUCOSE mg/dL 130* 152* 146* 131* 131* 119* 149*     Microbiology Results (last 10 days)        Procedure Component Value - Date/Time    Anaerobic Culture - Wound, Foot, Right [[id number]]  (Normal) Collected: 03/10/25 0909    Lab Status: Preliminary result Specimen: Wound from Foot, Right Updated: 03/13/25 0740     Anaerobic Culture No anaerobes isolated at 3 days    Wound Culture - Wound, Foot, Right [[id number]]  (Abnormal)  (Susceptibility) Collected: 03/10/25 0909    Lab Status: Final result Specimen: Wound from Foot, Right Updated: 03/13/25 0643     Wound Culture Scant growth (1+) Staphylococcus aureus, MRSA     Comment:   Methicillin resistant Staphylococcus aureus, Patient may be an isolation risk.        Gram Stain Rare (1+) Gram positive cocci      Rare (1+) WBCs seen    Narrative:            Susceptibility        Staphylococcus aureus, MRSA      ABBIE      Clindamycin Susceptible      Erythromycin Resistant      Oxacillin Resistant      Rifampin Susceptible      Tetracycline Susceptible      Trimethoprim + Sulfamethoxazole Resistant      Vancomycin Susceptible                           Wound Culture - Wound, Foot, Right [[id number]]  (Abnormal)  (Susceptibility) Collected: 03/06/25 1620    Lab Status: Edited Result - FINAL Specimen: Wound from Foot, Right Updated: 03/11/25 0837     Wound Culture Moderate growth (3+) Staphylococcus aureus, MRSA     Comment:   Methicillin resistant Staphylococcus aureus, Patient may be an isolation risk.         Scant growth (1+) Candida albicans      Light growth (2+) Normal Skin Ashley     Gram Stain Moderate (3+) WBCs per low power field      Few (2+) Gram positive cocci in pairs and clusters      Few (2+) Gram positive bacilli    Susceptibility        Staphylococcus aureus, MRSA      ABBIE      Clindamycin Susceptible      Daptomycin Susceptible (C)  [1]       Erythromycin Resistant      Oxacillin Resistant      Rifampin Susceptible      Tetracycline Susceptible      Trimethoprim + Sulfamethoxazole Resistant      Vancomycin Susceptible                   [1]  Appended  report. These results have been appended to a previously final verified report.                Susceptibility Comments       Staphylococcus aureus, MRSA    Daptomycin released per Eduardo in Pharmacy               Legionella Antigen, Urine - Urine, Urine, Clean Catch [[id number]]  (Normal) Collected: 03/06/25 1143    Lab Status: Final result Specimen: Urine, Clean Catch Updated: 03/06/25 1252     LEGIONELLA ANTIGEN, URINE Negative    S. Pneumo Ag Urine or CSF - Urine, Urine, Clean Catch [[id number]]  (Normal) Collected: 03/06/25 1143    Lab Status: Final result Specimen: Urine, Clean Catch Updated: 03/06/25 1252     Strep Pneumo Ag Negative    MRSA Screen, PCR (Inpatient) - Swab, Nares [[id number]]  (Normal) Collected: 03/03/25 2220    Lab Status: Final result Specimen: Swab from Nares Updated: 03/04/25 0013     MRSA PCR No MRSA Detected    Narrative:      The negative predictive value of this diagnostic test is high and should only be used to consider de-escalating anti-MRSA therapy. A positive result may indicate colonization with MRSA and must be correlated clinically.    Blood Culture - Blood, Arm, Left [[id number]]  (Normal) Collected: 03/03/25 1747    Lab Status: Final result Specimen: Blood from Arm, Left Updated: 03/08/25 1815     Blood Culture No growth at 5 days    Blood Culture - Blood, Hand, Right [[id number]]  (Normal) Collected: 03/03/25 1745    Lab Status: Final result Specimen: Blood from Hand, Right Updated: 03/08/25 1815     Blood Culture No growth at 5 days              Assessment:    Traumatic rhabdomyolysis    Arteriosclerosis of coronary artery    Essential hypertension    Type 2 diabetes mellitus with circulatory disorder, without long-term current use of insulin    Hyperlipidemia    Charcot-Davida-Tooth disease-like deformity of foot    Fall    S/P CABG x 2    KILLIAN (acute kidney injury)    Chronic diastolic (congestive) heart failure    Stage 3b chronic kidney disease    Chronic anticoagulation     Persistent atrial fibrillation    Closed displaced fracture of left clavicle    Pneumonia due to infectious organism    ATN (acute tubular necrosis)    Contusion of left knee    Acute osteomyelitis of right foot    Diabetic foot infection    MRSA (methicillin resistant Staphylococcus aureus) infection    Acute kidney injury  1-probable systemic illness due to  2-evolving ongoing infection with abscess of the right foot resulting in generalized weakness and  3-subsequent fall and fracture of left clavicle and left knee discomfort  4-diabetes mellitus  5-history of gout  6-sleep apnea  7-obesity  8-hypertension  9-severe anemia  10-acute on chronic renal failure - ESRD to start dialysis -3/12/2025  11-prior multiple diabetic foot infections and surgeries.  12-status post right IJ tunneled catheter with small post op pneumothorax     Recommendations/Discussions:  See my discussion and recommendations on 3/7/2025.  DC Zosyn simplified to Unasyn and if renal function is not considered as improvable and ongoing dialysis as his permanent care plan for end-stage renal disease then is not unreasonable to consider switching him from daptomycin to vancomycin.  Discussed with pharmacy.  Follow-up on operative culture results and depending upon operative findings and final surgery and subsequent concern for residual osteomyelitis final antibiotic regimen in terms of composition and duration would be recommended.  Nicolasa Denny MD  3/13/2025  09:48 EDT    Parts of this note may be an electronic transcription/translation of spoken language to printed text using the Dragon dictation system.

## 2025-03-13 NOTE — PROGRESS NOTES
[facility]  Cardiology Progress Note    Patient Identification:  Name: Rock Maciel Jr.  Age: 79 y.o.  Sex: male  :  1945  MRN: [id number]                 Follow Up / Chief Complaint:  follow up preop clearance    Interval History: Resting in bed.  No chest pain or shortness of breath.  On 2 L nasal cannula.      3/10/25 surgery for Incision and drainage, multiple compartments, right foot partial fourth metatarsal excision, right foot by Dr. Mchugh.    3/13/2025 excisional debridement down to including muscle of right foot by Dr. Mchugh.          Objective:    Past Medical History:  Past Medical History:   Diagnosis Date    Allergic rhinitis     Bronchitis     Coronary artery disease     Diabetes mellitus     DM (diabetes mellitus)     Encounter for annual health examination 2014    Annual Health Assessment    Gout     Hiatal hernia     History of MRSA infection     RIGHT FOOT     Hyperlipidemia     Hypertension     Murmur     Pneumothorax on right     Sleep apnea     pt wears CPAP at night    Wellness examination 2015    Annual Wellness Visit     Past Surgical History:  Past Surgical History:   Procedure Laterality Date    ARTERY SURGERY Bilateral     carotid    BONE EXCISION LEG Right 3/10/2025    Procedure: BONE EXCISION LOWER EXTERMITY, RIGHT;  Surgeon: Jimenez Mchugh DPM;  Location: [facility] OR;  Service: Podiatry;  Laterality: Right;    CARDIAC CATHETERIZATION N/A 2018    Procedure: Left Heart Cath;  Surgeon: Jo Toney MD;  Location: [facility] CATH INVASIVE LOCATION;  Service: Cardiovascular    CARDIAC CATHETERIZATION N/A 2018    Procedure: Coronary angiography;  Surgeon: Jo Toney MD;  Location: [facility] CATH INVASIVE LOCATION;  Service: Cardiovascular    CAROTID ENDARTERECTOMY Bilateral     COLONOSCOPY      CORONARY ARTERY BYPASS GRAFT WITH AORTIC VALVE REPAIR/REPLACEMENT N/A 2018    Procedure: INTRAOPERATIVE ALEX,  MIDLINE STERNOTOMY, CORONARY ARTERY BYPASS GRAFTING X 3 USING ENDOSCOPICALLY HARVESTED LEFT GREATER SAPHENOUS VEIN,  AORTIC VALVE REPLACEMENT USING 25MM LOPEZ II ULTRA PORCINE VALVE, PRP;  Surgeon: Phong Posey MD;  Location: [facility] OR;  Service: Cardiothoracic    FOOT SURGERY Right 2010    5th digit removal    FOOT SURGERY Left 2011    1 digit removed    INCISION AND DRAINAGE LEG Right 3/28/2020    Procedure: DEBRIDMENT OF RIGHT CALF;  Surgeon: Ross Pineda MD;  Location: [facility] MAIN OR;  Service: Vascular;  Laterality: Right;    INCISION AND DRAINAGE LEG Right 3/10/2025    Procedure: INCISION AND DRAINAGE LOWER EXTREMITY;  Surgeon: Jimenez Mchugh DPM;  Location: [facility] OR;  Service: Podiatry;  Laterality: Right;    INGUINAL HERNIA REPAIR Left 11/29/2024    Procedure: INGUINAL HERNIA REPAIR LAPAROSCOPIC WITH DAVINCI ROBOT;  Surgeon: Phong Lazo MD;  Location: [facility] OR;  Service: Robotics - DaVinci;  Laterality: Left;    INSERTION HEMODIALYSIS CATHETER N/A 3/11/2025    Procedure: HEMODIALYSIS CATHETER INSERTION;  Surgeon: Jeaneth Bonds MD;  Location: [facility] OR;  Service: Vascular;  Laterality: N/A;    QUADRICEPS TENDON REPAIR Left 5/14/2019    Procedure: Left QUADRICEPS TENDON REPAIR;  Surgeon: Camilo Hunter MD;  Location: [facility] OR;  Service: Orthopedics    THORACENTESIS Right 11/21/2016    THORACOSCOPY Right 5/8/2017    Procedure: BRONCHOSCOPY, RIGHT VAT,  TOTAL DECORTICATION RIGHT LUNG, PLEURAL BX, PLACEMENT SUBPLEURAL PAIN CAATHETERS X2;  Surgeon: Donald Orlando III, MD;  Location: [facility];  Service:     TONSILECTOMY, ADENOIDECTOMY, BILATERAL MYRINGOTOMY AND TUBES          Social History:   Social History     Tobacco Use    Smoking status: Never     Passive exposure: Never    Smokeless tobacco: Never   Substance Use Topics    Alcohol use: Yes     Alcohol/week: 7.0 standard drinks of alcohol     Types: 7 Drinks containing 0.5 oz of  alcohol per week     Comment: either one glass wine or one glass liquor per  day      Family History:  Family History   Problem Relation Age of Onset    Hypertension Father     Malig Hyperthermia Neg Hx           Allergies:  Allergies   Allergen Reactions    Ceclor [Cefaclor] Rash     Rash in his 50s; he tolerated piperacillin-tazobactam in March 2020     Scheduled Meds:  amiodarone, 200 mg, Daily  ampicillin-sulbactam, 3 g, Q24H  [Held by provider] apixaban, 2.5 mg, BID  vitamin C, 250 mg, Daily  atorvastatin, 20 mg, Nightly  bumetanide, 2 mg, BID Diuretics  cholecalciferol, 1,000 Units, Daily  [Held by provider] clopidogrel, 75 mg, Daily  DAPTOmycin, 8 mg/kg (Adjusted), Q48H  epoetin vince/vince-epbx, 10,000 Units, Once per day on Monday Wednesday Friday  hydrALAZINE, 10 mg, TID  insulin glargine, 5 Units, Nightly  insulin lispro, 2-9 Units, 4x Daily AC & at Bedtime  insulin lispro, 6 Units, TID With Meals  linagliptin, 5 mg, Daily  melatonin, 2.5 mg, Nightly  Menthol-Zinc Oxide, 1 Application, Q12H  metoprolol succinate XL, 25 mg, Daily  pantoprazole, 40 mg, Q AM  sodium chloride, 3 mL, Q12H  tamsulosin, 0.4 mg, Daily            INTAKE AND OUTPUT:    Intake/Output Summary (Last 24 hours) at 3/13/2025 1009  Last data filed at 3/13/2025 0510  Gross per 24 hour   Intake 548.52 ml   Output 1100 ml   Net -551.48 ml       Review of Systems:   GI: no n/v  Cardiac: no chest pain   Pulmonary: No shortness of breath     Constitutional:  Temp:  [97.3 °F (36.3 °C)-99 °F (37.2 °C)] 98.2 °F (36.8 °C)  Heart Rate:  [60-69] 60  Resp:  [14-18] 16  BP: (124-155)/(43-68) 125/52            Physical Exam:  General:  Alert, cooperative, appears in no acute distress  Respiratory: Clear to auscultation.  Normal respiratory effort and rate.  Cardiovascular: S1S2 Regular rate and rhythm. No murmur, rub or gallop.   Gastrointestinal: soft, non tender. Bowel sounds present.   Extremities: MARSHALL x4. No pretibial pitting edema. Adequate  "musculoskeletal strength.   Neuro: AAO x3 CN II-XII grossly intact      Cardiographics       ECG:     Echocardiogram:   10/2024  Interpretation Summary         Left ventricular ejection fraction appears to be 61 - 65%.    Left ventricular diastolic function was indeterminate.    The left atrial cavity is moderately dilated.    Left atrial volume is moderately increased.    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    Estimated right ventricular systolic pressure from tricuspid regurgitation is moderately elevated (45-55 mmHg).     Lab Review   Results from last 7 days   Lab Units 03/11/25  0518 03/10/25  0529 03/09/25  0714   CK TOTAL U/L 17* 20 39         Results from last 7 days   Lab Units 03/13/25  0452   SODIUM mmol/L 139   POTASSIUM mmol/L 4.5   BUN mg/dL 84*   CREATININE mg/dL 6.59*   CALCIUM mg/dL 7.8*     @[id number]@  Results from last 7 days   Lab Units 03/13/25  0452 03/12/25  0727 03/11/25  0518   WBC 10*3/mm3 8.65 9.14 9.23   HEMOGLOBIN g/dL 7.7* 6.8* 7.0*   HEMATOCRIT % 24.4* 21.4* 22.5*   PLATELETS 10*3/mm3 292 274 263               Assessment:    -Preop clearance for foot surgery  -Atrial fibrillation s/p cardioversion: SRR. QTC stable. A/c on hold.  Please add once medically stable and okay with all Riyad once  -Chronic amiodarone therapy  -Chronic anticoagulation  -History of CAD    Plan:  Hemoglobin 7.7 today, defer to attending.  Blood pressure and heart rate stable.     Restart Plavix and Eliquis once medically stable and okay with all.  Cardiac stable.  No changes from our perspective.    We will sign off.  Please call with any concerns.  He will follow-up with ZEINA Simental on April 8 at 1 PM.      )3/13/2025  ZEINA Fox      EMR iTOK/Transcription:   \"Dictated utilizing Dragon dictation\".     "

## 2025-03-13 NOTE — PROGRESS NOTES
[facility] Clinical Pharmacy Services: Daptomycin Consult     Rock KELLY Maciel Jr. has a pharmacy consult to dose daptomycin per Dr Denny's request.     Indication: MRSA       -- MRSA+ wound cx, ID wishes to avoid vanc due to renal function     3/13/25  I spoke with Eduin regarding vancomycin vs daptomycin given pt now requiring HD, Dr Denny ok with either option vancomycin or dapto, but deferring to nephrology to determine. I spoke with Dr Mccrary (nephro) who prefers to avoid vancomycin in hopes of renal recovery.   Bernice Mei PharmD, BCPS  3/13/2025 14:19 EDT        Pt getting hemodialysis based on response   3/12 HD 3.5h, dapto given after  3/13 HD 3.5h, dapto 800 mg ordered after     Pharmacy will need to assess daily for HD schedule and give daptomycin after as the schedule is uncertain at this time.       Bernice Mei PharmD, BCPS  3/13/2025 14:21 EDT

## 2025-03-13 NOTE — OP NOTE
Operative Report    Patient: Rock Maciel Jr.     Patient YOB: 1945     Date of Surgery: 03/13/25     MRN: [id number]       SURGEON: Jimenez Mchugh III, DPM     ASSISTANT: None    PROCEDURE: Excisional debridement down to and including muscle, right foot, 6.5 cm x 4.0 cm x 1.0 cm    PRE-OPERATIVE DIAGNOSIS: Open surgical wound, right foot    POST-OPERATIVE DIAGNOSIS: Same    ANAESTHESIA: MAC    HEMOSTASIS: None    ESTIMATED BLOOD LOSS: 15 cc    SPECIMEN: None    IMPLANTS: None    COMPLICATIONS: None    INDICATION FOR PROCEDURE: This is an 79-year-old male who had osteomyelitis and soft tissue gas who went to the operating room several days ago.  He is requiring return to the operating room today for debridement of nonviable necrotic tissue.    Consent was obtained pre-operatively. All questions were answered, risks versus benefits were discussed at length. No guarantees or assurances were given or implied.    PROCEDURE: Patient was brought to the operating room and placed on the operative table in supine position. A surgical timeout was performed and then patients name, date of birth, procedure and surgical site were all verified. A local block of 60 cc 1% lidocaine plain.  Was injected to the surgical site.  No tourniquet was used for this procedure.  The right lower extremity was then scrubbed, prepped and draped in the usual sterile manner.     Attention was directed to the right foot where there is noted to be an open wound with exposed metatarsal.  The wound was sharply debrided with a #15 blade and a curette and a rongeur.  All nonviable tissue was removed from surgical site.  The be good healthy bleeding present.  There is no purulent drainage.  The postoperative debridement was 6.5 cm x 4.0 cm x 1.0 cm.  Surgical site was flushed with copious amounts of sterile saline.  A dry sterile dressing with Betadine 4 x 4's Kerlix ABD pads and Coban was applied to the right lower extremity.  Patient  tolerated the procedure and anesthesia well.  Patient was transferred from the operating room to the recovery room with vital signs stable and neurovascular status intact.    Jimenez Mchugh III, LUCRECIA

## 2025-03-13 NOTE — NURSING NOTE
Dialysis complete, removed 1 liter with this treatment and no complications noted.  Pre and post vitals are in epic, dressing to dialysis catheter is current.  Report given to Madelin Cui rn.

## 2025-03-13 NOTE — PROGRESS NOTES
"      FOLLOW UP NOTE      Name: Rock Maciel Jr. ADMIT: 3/3/2025   : 1945  PCP: Denise Dickson APRN    MRN: [id number] LOS: 10 days   AGE/SEX: 79 y.o. male  ROOM: [facility]     Date of Service: 3/13/2025                           CHIEF COMPLIANTS / REASON FOR FOLLOW UP                Subjective:    HD completed yesterday.  Tolerated well.  No nausea or vomiting or headache or blurred vision.  S/p foot surgery this morning.       Review of Systems:     Negative except as above       OBJECTIVE                                                                        Exam:  /52   Pulse 60   Temp 98.2 °F (36.8 °C) (Oral)   Resp 16   Ht 185.4 cm (73\")   Wt 127 kg (280 lb 10.3 oz)   SpO2 95%   BMI 37.03 kg/m²   Intake/Output last 3 shifts:  I/O last 3 completed shifts:  In: 548.5 [P.O.:240; Blood:308.5]  Out: 1100 [Urine:100]  Intake/Output this shift:  No intake/output data recorded.    General Appearance: Chronically ill, pale appearing      Lungs:   Lungs largely clear, diminished at lung bases  Heart: RRR  Abdomen:  Soft, nontender  Extremities: Extremities wrapped, 1+ edema, knee effusion noted  Neurologic:   Alert and oriented  [facility] TDC      Scheduled Meds:amiodarone, 200 mg, Oral, Daily  ampicillin-sulbactam, 3 g, Intravenous, Q24H  [Held by provider] apixaban, 2.5 mg, Oral, BID  vitamin C, 250 mg, Oral, Daily  atorvastatin, 20 mg, Oral, Nightly  bumetanide, 2 mg, Oral, BID Diuretics  cholecalciferol, 1,000 Units, Oral, Daily  [Held by provider] clopidogrel, 75 mg, Oral, Daily  DAPTOmycin, 8 mg/kg (Adjusted), Intravenous, Q48H  epoetin vince/vince-epbx, 10,000 Units, Intravenous, Once per day on   hydrALAZINE, 10 mg, Oral, TID  insulin glargine, 5 Units, Subcutaneous, Nightly  insulin lispro, 2-9 Units, Subcutaneous, 4x Daily AC & at Bedtime  insulin lispro, 6 Units, Subcutaneous, TID With Meals  linagliptin, 5 mg, Oral, Daily  melatonin, 2.5 mg, Oral, Nightly  Menthol-Zinc " Oxide, 1 Application, Topical, Q12H  metoprolol succinate XL, 25 mg, Oral, Daily  pantoprazole, 40 mg, Oral, Q AM  sodium chloride, 3 mL, Intravenous, Q12H  tamsulosin, 0.4 mg, Oral, Daily      Continuous Infusions:Pharmacy Consult - Pharmacy to dose,         PRN Meds:  artificial tears    senna-docusate sodium **AND** polyethylene glycol **AND** bisacodyl **AND** bisacodyl    cadexomer iodine    dextrose    dextrose    famotidine    glucagon (human recombinant)    heparin (porcine)    nitroglycerin    ondansetron ODT **OR** ondansetron    Pharmacy Consult - Pharmacy to dose    sodium chloride    sodium chloride         Data Review:                                                                           Labs reviewed        Imaging:                                                                           Radiology reviewed           ASSESSMENT:                                                                                Traumatic rhabdomyolysis    Arteriosclerosis of coronary artery    Essential hypertension    Type 2 diabetes mellitus with circulatory disorder, without long-term current use of insulin    Hyperlipidemia    Charcot-Davida-Tooth disease-like deformity of foot    Fall    S/P CABG x 2    KILLIAN (acute kidney injury)    Chronic diastolic (congestive) heart failure    Stage 3b chronic kidney disease    Chronic anticoagulation    Persistent atrial fibrillation    Closed displaced fracture of left clavicle    Pneumonia due to infectious organism    ATN (acute tubular necrosis)    Contusion of left knee    Acute osteomyelitis of right foot    Diabetic foot infection    MRSA (methicillin resistant Staphylococcus aureus) infection    Acute kidney injury         KILLIAN: likely ATN related to rhabdomyolysis, prerenal insult related to diuretics etc.  Needing RRT since 3/11/2025. Reason for GFR decline likely multifactorial, some ATN, ongoing osteomyelitis etc.   RIJ TDC placed 3/11/2025.  First HD 3/12.    Lower  extremity osteomyelitis/cellulitis  CKD stage III-IV: Baseline creatinine  1.8-2.6 mg/dL with baseline GFR around 25 mL/min  Acute urinary retention requiring Johnson catheter placement.  Clavicle fracture  A-fib with controlled rates.  History of HFpEF with pulmonary hypertension: Slightly volume expanded.  Severe anemia in CKD:          PLAN:                                                                            HD today with 1 L UF goal.  Repeat HD again tomorrow.  Podiatry following for foot osteomyelitis.  Antibiotics dosed for  CrCl<15 mL/min.  Follow-up with ID.  Continue EPO with HD.  Transfuse as per primary.      Tiesha Mccrary MD  [facility]  3/13/2025  10:59 EDT

## 2025-03-13 NOTE — ANESTHESIA POSTPROCEDURE EVALUATION
"Patient: Rock Maciel Jr.    Procedure Summary       Date: 03/13/25 Room / Location: [facility] OR [address] [place] [address] MAIN OR    Anesthesia Start: 0732 Anesthesia Stop: 0812    Procedure: DEBRIDEMENT FOOT, RIGHT (Right) Diagnosis:       Acute hematogenous osteomyelitis of right foot      (Acute hematogenous osteomyelitis of right foot [M86.071])    Surgeons: Jimenez Mchugh DPM Provider: Tin Badillo DO    Anesthesia Type: MAC ASA Status: 4            Anesthesia Type: MAC    Vitals  Vitals Value Taken Time   /52 03/13/25 08:30   Temp 36.8 °C (98.2 °F) 03/13/25 08:16   Pulse 61 03/13/25 08:39   Resp 16 03/13/25 08:30   SpO2 95 % 03/13/25 08:39   Vitals shown include unfiled device data.        Post Anesthesia Care and Evaluation    Patient location during evaluation: bedside  Patient participation: complete - patient participated  Level of consciousness: awake and alert  Pain management: adequate    Airway patency: patent  Anesthetic complications: No anesthetic complications  PONV Status: controlled  Cardiovascular status: acceptable and hemodynamically stable  Respiratory status: acceptable, spontaneous ventilation and nonlabored ventilation  Hydration status: acceptable    Comments: /52   Pulse 60   Temp 36.8 °C (98.2 °F) (Oral)   Resp 16   Ht 185.4 cm (73\")   Wt 127 kg (280 lb 10.3 oz)   SpO2 95%   BMI 37.03 kg/m²       "

## 2025-03-13 NOTE — PROGRESS NOTES
Name: Rock Maciel Jr. ADMIT: 3/3/2025   : 1945  PCP: Denise Dickson APRN    MRN: [id number] LOS: 10 days   AGE/SEX: 79 y.o. male  ROOM: [facility]     Subjective   Subjective     No events overnight. No complaints. I saw him in dialysis. He underwent right foot debridement today with podiatry and he has no pain.       Objective   Objective   Vital Signs  Temp:  [97.3 °F (36.3 °C)-99 °F (37.2 °C)] 97.7 °F (36.5 °C)  Heart Rate:  [60-69] 63  Resp:  [14-18] 18  BP: (124-162)/(43-68) 162/64  SpO2:  [95 %-100 %] 98 %  on  Flow (L/min) (Oxygen Therapy):  [2] 2;   Device (Oxygen Therapy): nasal cannula  Body mass index is 37.03 kg/m².  Physical Exam  Constitutional:       General: He is not in acute distress.     Appearance: He is obese. He is not toxic-appearing.   Cardiovascular:      Rate and Rhythm: Normal rate and regular rhythm.   Pulmonary:      Effort: Pulmonary effort is normal.      Breath sounds: Normal breath sounds.   Abdominal:      General: Bowel sounds are normal.      Palpations: Abdomen is soft.   Musculoskeletal:         General: No tenderness.      Right lower leg: No edema.      Left lower leg: No edema.      Comments: Left transmetatarsal amputation  Right foot bandaged   Skin:     Comments: Right chest tunneled dialysis catheter   Neurological:      Mental Status: He is alert.   Psychiatric:         Mood and Affect: Mood normal.         Behavior: Behavior normal.         Results Review     I reviewed the patient's new clinical results.  Results from last 7 days   Lab Units 25  04525  0725  0518 25  0714   WBC 10*3/mm3 8.65 9.14 9.23 9.95   HEMOGLOBIN g/dL 7.7* 6.8* 7.0* 7.6*   PLATELETS 10*3/mm3 292 274 263 261     Results from last 7 days   Lab Units 25  0452 25  0727 25  0518 03/10/25  0529   SODIUM mmol/L 139 134* 136 134*   POTASSIUM mmol/L 4.5 5.2 4.9 4.5   CHLORIDE mmol/L 103 99 100 100   CO2 mmol/L 20.0* 20.7* 21.0* 19.8*   BUN  "mg/dL 84* 111* 107* 91*   CREATININE mg/dL 6.59* 7.39* 6.67* 5.85*   GLUCOSE mg/dL 130* 152* 146* 131*   Estimated Creatinine Clearance: 12.5 mL/min (A) (by C-G formula based on SCr of 6.59 mg/dL (H)).        Results from last 7 days   Lab Units 03/13/25  0452 03/12/25  0727 03/11/25  0518 03/10/25  0529   CALCIUM mg/dL 7.8* 8.2* 8.0* 7.8*     No results found for: \"COVID19\"  Glucose   Date/Time Value Ref Range Status   03/13/2025 1107 162 (H) 70 - 130 mg/dL Final   03/13/2025 0613 135 (H) 70 - 130 mg/dL Final   03/12/2025 2134 201 (H) 70 - 130 mg/dL Final   03/12/2025 1816 125 70 - 130 mg/dL Final   03/12/2025 1125 156 (H) 70 - 130 mg/dL Final   03/12/2025 0606 168 (H) 70 - 130 mg/dL Final   03/11/2025 2121 168 (H) 70 - 130 mg/dL Final       XR Chest 2 View  Narrative: XR CHEST 2 VW-     HISTORY: Male who is 79 years-old, pneumothorax, follow-up     TECHNIQUE: Frontal and lateral views of the chest     COMPARISON: 3/12/2025     FINDINGS: Right-sided central venous catheter appears stable. Sternotomy  wires are noted. Heart is enlarged. Pulmonary vasculature is congested.  Bilateral pulmonary opacities appear similar to prior exam. Small left  pleural effusion is apparent. No pneumothorax. No acute osseous process.     Impression: No significant change.     This report was finalized on 3/12/2025 1:37 PM by Dr. Azam Alaniz M.D on Workstation: [id number]     XR Chest 1 View  Narrative: XR CHEST 1 VW-     HISTORY: Male who is 79 years-old, pneumothorax, follow-up     TECHNIQUE: Frontal views of the chest     COMPARISON: 3/11/2025     FINDINGS: Right-sided central venous catheter appears stable. Sternotomy  wires are noted. The heart is enlarged. Pulmonary vasculature appears  mildly congested. Some hazy opacity at the left midlung may represent  infiltrate, follow-up advised. Minimal left pleural effusion is  suggested. No definite pneumothorax. Right hemidiaphragm remains  elevated. No acute osseous process.   "   Impression: As described.     This report was finalized on 3/12/2025 12:00 PM by Dr. Azam Alaniz M.D on Workstation: [id number]       Scheduled Medications  amiodarone, 200 mg, Oral, Daily  ampicillin-sulbactam, 3 g, Intravenous, Q24H  [Held by provider] apixaban, 2.5 mg, Oral, BID  vitamin C, 250 mg, Oral, Daily  atorvastatin, 20 mg, Oral, Nightly  bumetanide, 2 mg, Oral, BID Diuretics  cholecalciferol, 1,000 Units, Oral, Daily  [Held by provider] clopidogrel, 75 mg, Oral, Daily  DAPTOmycin, 8 mg/kg (Adjusted), Intravenous, Once  Pharmacy to Dose daptomycin (CUBICIN), , Not Applicable, Daily  epoetin vince/vince-epbx, 10,000 Units, Intravenous, Once per day on Monday Wednesday Friday  hydrALAZINE, 10 mg, Oral, TID  insulin glargine, 5 Units, Subcutaneous, Nightly  insulin lispro, 2-9 Units, Subcutaneous, 4x Daily AC & at Bedtime  insulin lispro, 6 Units, Subcutaneous, TID With Meals  linagliptin, 5 mg, Oral, Daily  melatonin, 2.5 mg, Oral, Nightly  Menthol-Zinc Oxide, 1 Application, Topical, Q12H  metoprolol succinate XL, 25 mg, Oral, Daily  pantoprazole, 40 mg, Oral, Q AM  sodium chloride, 3 mL, Intravenous, Q12H  tamsulosin, 0.4 mg, Oral, Daily    Infusions  Pharmacy Consult - Pharmacy to dose,     Diet  Diet: Regular/House, Cardiac, Diabetic; Healthy Heart (2-3 Na+); Consistent Carbohydrate; Fluid Consistency: Thin (IDDSI 0)       Assessment/Plan     Active Hospital Problems    Diagnosis  POA    **Traumatic rhabdomyolysis [T79.6XXA]  Yes    MRSA (methicillin resistant Staphylococcus aureus) infection [A49.02]  Yes    Contusion of left knee [S80.02XA]  Yes    Acute osteomyelitis of right foot [M86.071]  Yes    Diabetic foot infection [E11.628, L08.9]  Yes    ATN (acute tubular necrosis) [N17.0]  Yes    Closed displaced fracture of left clavicle [S42.002A]  Yes    Pneumonia due to infectious organism [J18.9]  Yes    Acute kidney injury [N17.9]  Unknown    Persistent atrial fibrillation [I48.19]  Yes     Chronic anticoagulation [Z79.01]  Not Applicable    Stage 3b chronic kidney disease [N18.32]  Yes    Chronic diastolic (congestive) heart failure [I50.32]  Yes    KILLIAN (acute kidney injury) [N17.9]  Yes    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       79 y.o. male admitted with Traumatic rhabdomyolysis.    79 year old man who presented following a mechanical fall leading to rhabdomyolysis, left knee contusion with left knee effusion and meniscus tear, and a clavicular fracture. He was also found to have KILLIAN on CKD 4, pneumonia, osteomyelitis and abscess/cellulitis of the right foot.    Traumatic rhabdomyolysis-resolved with IVF.   KILLIAN on CKD 4-prerenal due to ATN. Now s/p tunneled dialysis cathter placement and is now on HD  Post procedural right apical pneumothorax-tiny. Gone on repeat chest xray  Pneumonia-completed a course of zosyn while hospitalized  Osteomyelitis and abscess of the right foot-s/p I&D and partial 4th metatarsal excision on 3/10/25 by Dr. Mchugh and then additional debridement of nonviable necrotic tissue on 3/13/25. Currently on daptomycin and unasyn per ID with final antibiotic plans pending  Acute urinary retention/BPH-abdi catheter placed. Urology evaluated and recommended flomax and a voiding trial on the morning of discharge.  Left clavicular fracture-orthopedic surgery evaluated and recommended a sling as tolerated, ice, pain control, and to avoid forward flexion for now  Left knee contusion with left knee effusion and meniscus tear-orthopedic surgery recommends a knee brace/immobilizer for at least 3 weeks  and then begin range of motion exercises with PT  Anemia of chronic disease/CKD-hgb is up to 7.7 after transfusion yesterday  Chronic afib-amiodarone. Eliquis is held acutely for procedures. Restart when ok with podiatry.  Chronic diastolic heart failure-bumex, though not really making much urine at this point. Volume is largely being managed with HD  Coronary artery disease-plavix is held acutely for procedures, restart when ok with podiatry. Statin/bb.   Type 2 diabetes-A1c is 6. Glucose is at goal acutely on current regimen  Essential hypertension-adequate control acutely  Hyperlipidemia-statin  SCDs for DVT prophylaxis.  Full code.  Discussed with patient.  Anticipate discharge to SNU facility next week.      Yovanny Kowalski MD  [facility]  03/13/25  14:18 EDT

## 2025-03-13 NOTE — PLAN OF CARE
Goal Outcome Evaluation:  Plan of Care Reviewed With: patient   Pt's vital signs are stable. Debridement of the right foot performed this morning. Went to dialysis today- 1L pulled. Remains on 2L NC. Care plan ongoing.      Progress: no change

## 2025-03-13 NOTE — PROGRESS NOTES
"Nutrition Services    Patient Name:  Rock Maciel Jr.  YOB: 1945  MRN: [id number]  Admit Date:  3/3/2025  Assessment Date:  03/13/25    NUTRITION SCREENING      Reason for Encounter Length of Stay   Diagnosis/Problem Traumatic rhabdo  3/10 surgery for Incision and drainage, multiple compartments, right foot partial fourth metatarsal excision, right foot  3/13 excisional debridement down to including muscle of right foot  S/p TDC 3/11 with first HD 3/12; HD today also       PO Diet Diet: Regular/House, Cardiac, Diabetic; Healthy Heart (2-3 Na+); Consistent Carbohydrate; Fluid Consistency: Thin (IDDSI 0)   Supplements n/a   PO Intake % No intake available  RN reports pt has missed some meals here and there d/t multiple procedures over past few days, but has an appetite       Medications MAR reviewed by RD   Labs  Listed below, reviewed   Physical Findings Obese  Alert, oriented  2L O2  Trace/mild/moderate edema   GI Function +BM 3/10   Skin Status B=13, bruised, R gluteal wound, R plantar diabetic ulcer, L thigh wound, R chest surgical inc, R foot surgical inc       Height  Weight  BMI  Weight Trend     Height: 185.4 cm (73\")  Weight: 127 kg (280 lb 10.3 oz) (03/11/25 0533)  Body mass index is 37.03 kg/m².  Other: fluctuations       Nutrition Problem (PES) No nutrition diagnosis at this time.       Intervention/Plan RD to follow up per protocol.     Results from last 7 days   Lab Units 03/13/25  0452 03/12/25  0727 03/11/25  0518   SODIUM mmol/L 139 134* 136   POTASSIUM mmol/L 4.5 5.2 4.9   CHLORIDE mmol/L 103 99 100   CO2 mmol/L 20.0* 20.7* 21.0*   BUN mg/dL 84* 111* 107*   CREATININE mg/dL 6.59* 7.39* 6.67*   CALCIUM mg/dL 7.8* 8.2* 8.0*   GLUCOSE mg/dL 130* 152* 146*     Results from last 7 days   Lab Units 03/13/25  0452   HEMOGLOBIN g/dL 7.7*   HEMATOCRIT % 24.4*     Lab Results   Component Value Date    HGBA1C 6.00 (H) 03/03/2025         Electronically signed by:  Savannah Golden RD  03/13/25 " 16:22 EDT

## 2025-03-14 LAB
ALBUMIN SERPL-MCNC: 2.4 G/DL (ref 3.5–5.2)
ANION GAP SERPL CALCULATED.3IONS-SCNC: 14 MMOL/L (ref 5–15)
BUN SERPL-MCNC: 64 MG/DL (ref 8–23)
BUN/CREAT SERPL: 11.1 (ref 7–25)
CALCIUM SPEC-SCNC: 7.6 MG/DL (ref 8.6–10.5)
CHLORIDE SERPL-SCNC: 103 MMOL/L (ref 98–107)
CO2 SERPL-SCNC: 21 MMOL/L (ref 22–29)
CREAT SERPL-MCNC: 5.76 MG/DL (ref 0.76–1.27)
DEPRECATED RDW RBC AUTO: 51.6 FL (ref 37–54)
EGFRCR SERPLBLD CKD-EPI 2021: 9.3 ML/MIN/1.73
ERYTHROCYTE [DISTWIDTH] IN BLOOD BY AUTOMATED COUNT: 15.7 % (ref 12.3–15.4)
GLUCOSE BLDC GLUCOMTR-MCNC: 106 MG/DL (ref 70–130)
GLUCOSE BLDC GLUCOMTR-MCNC: 112 MG/DL (ref 70–130)
GLUCOSE BLDC GLUCOMTR-MCNC: 137 MG/DL (ref 70–130)
GLUCOSE BLDC GLUCOMTR-MCNC: 219 MG/DL (ref 70–130)
GLUCOSE SERPL-MCNC: 106 MG/DL (ref 65–99)
HCT VFR BLD AUTO: 22.7 % (ref 37.5–51)
HGB BLD-MCNC: 7.3 G/DL (ref 13–17.7)
MCH RBC QN AUTO: 28.7 PG (ref 26.6–33)
MCHC RBC AUTO-ENTMCNC: 32.2 G/DL (ref 31.5–35.7)
MCV RBC AUTO: 89.4 FL (ref 79–97)
PHOSPHATE SERPL-MCNC: 6.5 MG/DL (ref 2.5–4.5)
PLATELET # BLD AUTO: 268 10*3/MM3 (ref 140–450)
PMV BLD AUTO: 9.5 FL (ref 6–12)
POTASSIUM SERPL-SCNC: 4.1 MMOL/L (ref 3.5–5.2)
RBC # BLD AUTO: 2.54 10*6/MM3 (ref 4.14–5.8)
SODIUM SERPL-SCNC: 138 MMOL/L (ref 136–145)
WBC NRBC COR # BLD AUTO: 9.21 10*3/MM3 (ref 3.4–10.8)

## 2025-03-14 PROCEDURE — 86900 BLOOD TYPING SEROLOGIC ABO: CPT

## 2025-03-14 PROCEDURE — 82948 REAGENT STRIP/BLOOD GLUCOSE: CPT

## 2025-03-14 PROCEDURE — 85027 COMPLETE CBC AUTOMATED: CPT | Performed by: STUDENT IN AN ORGANIZED HEALTH CARE EDUCATION/TRAINING PROGRAM

## 2025-03-14 PROCEDURE — 97530 THERAPEUTIC ACTIVITIES: CPT

## 2025-03-14 PROCEDURE — 25010000002 HEPARIN (PORCINE) PER 1000 UNITS: Performed by: PODIATRIST

## 2025-03-14 PROCEDURE — P9016 RBC LEUKOCYTES REDUCED: HCPCS

## 2025-03-14 PROCEDURE — 80069 RENAL FUNCTION PANEL: CPT | Performed by: STUDENT IN AN ORGANIZED HEALTH CARE EDUCATION/TRAINING PROGRAM

## 2025-03-14 PROCEDURE — 63710000001 INSULIN LISPRO (HUMAN) PER 5 UNITS: Performed by: PODIATRIST

## 2025-03-14 PROCEDURE — 25010000002 EPOETIN ALFA-EPBX 10000 UNIT/ML SOLUTION: Performed by: PODIATRIST

## 2025-03-14 PROCEDURE — 86923 COMPATIBILITY TEST ELECTRIC: CPT

## 2025-03-14 PROCEDURE — 97535 SELF CARE MNGMENT TRAINING: CPT

## 2025-03-14 PROCEDURE — 25010000002 DAPTOMYCIN PER 1 MG: Performed by: INTERNAL MEDICINE

## 2025-03-14 PROCEDURE — 25010000002 AMPICILLIN-SULBACTAM PER 1.5 G: Performed by: INTERNAL MEDICINE

## 2025-03-14 RX ORDER — HYDROCODONE BITARTRATE AND ACETAMINOPHEN 5; 325 MG/1; MG/1
1 TABLET ORAL EVERY 6 HOURS PRN
Refills: 0 | Status: DISCONTINUED | OUTPATIENT
Start: 2025-03-14 | End: 2025-03-19 | Stop reason: HOSPADM

## 2025-03-14 RX ADMIN — TAMSULOSIN HYDROCHLORIDE 0.4 MG: 0.4 CAPSULE ORAL at 09:11

## 2025-03-14 RX ADMIN — MENTHOL, ZINC OXIDE 1 APPLICATION: .44; 20.6 OINTMENT TOPICAL at 20:28

## 2025-03-14 RX ADMIN — APIXABAN 2.5 MG: 2.5 TABLET, FILM COATED ORAL at 20:28

## 2025-03-14 RX ADMIN — ATORVASTATIN CALCIUM 20 MG: 20 TABLET, FILM COATED ORAL at 20:28

## 2025-03-14 RX ADMIN — INSULIN LISPRO 6 UNITS: 100 INJECTION, SOLUTION INTRAVENOUS; SUBCUTANEOUS at 18:03

## 2025-03-14 RX ADMIN — BUMETANIDE 2 MG: 1 TABLET ORAL at 18:18

## 2025-03-14 RX ADMIN — Medication 3 ML: at 09:30

## 2025-03-14 RX ADMIN — HEPARIN SODIUM 4000 UNITS: 1000 INJECTION INTRAVENOUS; SUBCUTANEOUS at 17:39

## 2025-03-14 RX ADMIN — INSULIN LISPRO 6 UNITS: 100 INJECTION, SOLUTION INTRAVENOUS; SUBCUTANEOUS at 11:55

## 2025-03-14 RX ADMIN — HYDRALAZINE HYDROCHLORIDE 10 MG: 10 TABLET ORAL at 09:10

## 2025-03-14 RX ADMIN — MENTHOL, ZINC OXIDE 1 APPLICATION: .44; 20.6 OINTMENT TOPICAL at 10:00

## 2025-03-14 RX ADMIN — BUMETANIDE 2 MG: 1 TABLET ORAL at 09:12

## 2025-03-14 RX ADMIN — HYDRALAZINE HYDROCHLORIDE 10 MG: 10 TABLET ORAL at 16:58

## 2025-03-14 RX ADMIN — EPOETIN ALFA-EPBX 10000 UNITS: 10000 INJECTION, SOLUTION INTRAVENOUS; SUBCUTANEOUS at 14:44

## 2025-03-14 RX ADMIN — LINAGLIPTIN 5 MG: 5 TABLET, FILM COATED ORAL at 09:11

## 2025-03-14 RX ADMIN — SODIUM CHLORIDE 3 G: 9 INJECTION, SOLUTION INTRAVENOUS at 11:43

## 2025-03-14 RX ADMIN — PANTOPRAZOLE SODIUM 40 MG: 40 TABLET, DELAYED RELEASE ORAL at 06:19

## 2025-03-14 RX ADMIN — EPOETIN ALFA-EPBX 10000 UNITS: 10000 INJECTION, SOLUTION INTRAVENOUS; SUBCUTANEOUS at 09:10

## 2025-03-14 RX ADMIN — Medication 1000 UNITS: at 09:11

## 2025-03-14 RX ADMIN — DAPTOMYCIN 800 MG: 500 INJECTION, POWDER, LYOPHILIZED, FOR SOLUTION INTRAVENOUS at 18:05

## 2025-03-14 RX ADMIN — AMIODARONE HYDROCHLORIDE 200 MG: 200 TABLET ORAL at 09:11

## 2025-03-14 RX ADMIN — Medication 3 ML: at 20:28

## 2025-03-14 RX ADMIN — Medication 2.5 MG: at 20:28

## 2025-03-14 RX ADMIN — HYDROCODONE BITARTRATE AND ACETAMINOPHEN 1 TABLET: 5; 325 TABLET ORAL at 01:00

## 2025-03-14 RX ADMIN — HYDRALAZINE HYDROCHLORIDE 10 MG: 10 TABLET ORAL at 20:28

## 2025-03-14 RX ADMIN — INSULIN LISPRO 4 UNITS: 100 INJECTION, SOLUTION INTRAVENOUS; SUBCUTANEOUS at 11:54

## 2025-03-14 RX ADMIN — OXYCODONE HYDROCHLORIDE AND ACETAMINOPHEN 250 MG: 500 TABLET ORAL at 09:10

## 2025-03-14 RX ADMIN — METOPROLOL SUCCINATE 25 MG: 25 TABLET, EXTENDED RELEASE ORAL at 09:11

## 2025-03-14 NOTE — THERAPY RE-EVALUATION
Patient Name: Rock Maciel Jr.  : 1945    MRN: [id number]                              Today's Date: 3/14/2025       Admit Date: 3/3/2025    Visit Dx:     ICD-10-CM ICD-9-CM   1. Traumatic rhabdomyolysis, initial encounter  T79.6XXA 958.6   2. Acute renal failure superimposed on chronic kidney disease, unspecified acute renal failure type, unspecified CKD stage  N17.9 584.9    N18.9 585.9   3. Sepsis, due to unspecified organism, unspecified whether acute organ dysfunction present  A41.9 038.9     995.91   4. Pneumonia due to infectious organism, unspecified laterality, unspecified part of lung  J18.9 486   5. Hyperglycemia  R73.9 790.29   6. Anemia, unspecified type  D64.9 285.9   7. Closed displaced fracture of acromial end of left clavicle, initial encounter  S42.032A 810.03   8. Decreased activities of daily living (ADL)  Z78.9 V49.89   9. Diabetic foot infection  E11.628 250.80    L08.9 686.9   10. Acute osteomyelitis of right foot  M86.071 730.07   11. Stage 3b chronic kidney disease  N18.32 585.3   12. Acute kidney injury  N17.9 584.9   13. ATN (acute tubular necrosis)  N17.0 584.5   14. End stage renal disease  N18.6 585.6     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    Chronic venous insufficiency    Nonrheumatic aortic valve stenosis    Carotid stenosis, asymptomatic    Bradycardia    Hyperlipidemia    Bilateral carotid artery disease    Abnormal cardiovascular stress test    H/O aortic valve replacement with porcine valve    Charcot-Davida-Tooth disease-like deformity of foot    Amputated toe of right  foot    Fall    Non-traumatic rhabdomyolysis    S/P CABG x 2    CKD (chronic kidney disease) stage 3, GFR 30-59 ml/min    Rupture of left quadriceps tendon    Constipation    KILLIAN (acute kidney injury)    Wound of right leg    Anemia    Chronic diastolic (congestive) heart failure    Amputated toe of left foot    Gas gangrene    Cellulitis of left lower limb    Acquired absence of left foot    Bilateral lower extremity edema    Stage 3b chronic kidney disease    History of pneumonia    Atelectasis of both lungs    Abnormal pleural fluid    Pleural thickening    Atrial fibrillation, persistent    Chronic anticoagulation    Persistent atrial fibrillation    Bladder wall thickening    Hematuria    CKD (chronic kidney disease) stage 4, GFR 15-29 ml/min    Hypoxemia    Nocturnal hypoxemia    Status post left inguinal hernia repair, follow-up exam    Weakness of both lower extremities    Unsteady gait when walking    Wound, open, arm, forearm, right, subsequent encounter    Traumatic rhabdomyolysis    Closed displaced fracture of left clavicle    Pneumonia due to infectious organism    ATN (acute tubular necrosis)    Contusion of left knee    Acute osteomyelitis of right foot    Diabetic foot infection    MRSA (methicillin resistant Staphylococcus aureus) infection    Acute kidney injury     Past Medical History:   Diagnosis Date    Allergic rhinitis     Bronchitis     Coronary artery disease     Diabetes mellitus 2001    DM (diabetes mellitus)     Encounter for annual health examination 05/06/2014    Annual Health Assessment    Gout     Hiatal hernia     History of MRSA infection     RIGHT FOOT 2010    Hyperlipidemia     Hypertension     Murmur     Pneumothorax on right     Sleep apnea     pt wears CPAP at night    Wellness examination 06/24/2015    Annual Wellness Visit     Past Surgical History:   Procedure Laterality Date    ARTERY SURGERY Bilateral     carotid    BONE EXCISION LEG Right 3/10/2025    Procedure: BONE  EXCISION LOWER EXTERMITY, RIGHT;  Surgeon: Jimenez Mchugh DPM;  Location: [facility] MAIN OR;  Service: Podiatry;  Laterality: Right;    CARDIAC CATHETERIZATION N/A 7/20/2018    Procedure: Left Heart Cath;  Surgeon: Jo Toney MD;  Location: [facility] CATH INVASIVE LOCATION;  Service: Cardiovascular    CARDIAC CATHETERIZATION N/A 7/20/2018    Procedure: Coronary angiography;  Surgeon: Jo Toney MD;  Location: [facility] CATH INVASIVE LOCATION;  Service: Cardiovascular    CAROTID ENDARTERECTOMY Bilateral     COLONOSCOPY  2008    CORONARY ARTERY BYPASS GRAFT WITH AORTIC VALVE REPAIR/REPLACEMENT N/A 7/23/2018    Procedure: INTRAOPERATIVE ALEX, MIDLINE STERNOTOMY, CORONARY ARTERY BYPASS GRAFTING X 3 USING ENDOSCOPICALLY HARVESTED LEFT GREATER SAPHENOUS VEIN,  AORTIC VALVE REPLACEMENT USING 25MM LOPEZ II ULTRA PORCINE VALVE, PRP;  Surgeon: Phong Posey MD;  Location: [facility] MAIN OR;  Service: Cardiothoracic    FOOT SURGERY Right 2010    5th digit removal    FOOT SURGERY Left 2011    1 digit removed    INCISION AND DRAINAGE LEG Right 3/28/2020    Procedure: DEBRIDMENT OF RIGHT CALF;  Surgeon: Ross Pineda MD;  Location: [facility] MAIN OR;  Service: Vascular;  Laterality: Right;    INCISION AND DRAINAGE LEG Right 3/10/2025    Procedure: INCISION AND DRAINAGE LOWER EXTREMITY;  Surgeon: Jimenez Mchugh DPM;  Location: [facility] MAIN OR;  Service: Podiatry;  Laterality: Right;    INGUINAL HERNIA REPAIR Left 11/29/2024    Procedure: INGUINAL HERNIA REPAIR LAPAROSCOPIC WITH DAVINCI ROBOT;  Surgeon: Phong Lazo MD;  Location: [facility] MAIN OR;  Service: Robotics - DaVinci;  Laterality: Left;    INSERTION HEMODIALYSIS CATHETER N/A 3/11/2025    Procedure: HEMODIALYSIS CATHETER INSERTION;  Surgeon: Jeaneth Bonds MD;  Location: [facility] MAIN OR;  Service: Vascular;  Laterality: N/A;    QUADRICEPS TENDON REPAIR Left 5/14/2019    Procedure: Left QUADRICEPS TENDON REPAIR;  Surgeon: Dale  Camilo REAL MD;  Location: [facility] OR;  Service: Orthopedics    THORACENTESIS Right 11/21/2016    THORACOSCOPY Right 5/8/2017    Procedure: BRONCHOSCOPY, RIGHT VAT,  TOTAL DECORTICATION RIGHT LUNG, PLEURAL BX, PLACEMENT SUBPLEURAL PAIN CAATHETERS X2;  Surgeon: Donald Orlando III, MD;  Location: [facility] OR;  Service:     TONSILECTOMY, ADENOIDECTOMY, BILATERAL MYRINGOTOMY AND TUBES      WOUND DEBRIDEMENT Right 3/13/2025    Procedure: DEBRIDEMENT FOOT, RIGHT;  Surgeon: Jimenez Mchugh DPM;  Location: [facility];  Service: Podiatry;  Laterality: Right;      General Information       Row Name 03/14/25 3690          Physical Therapy Time and Intention    Document Type re-evaluation  Pt. is now s/p Right foot debridement  -MS     Mode of Treatment physical therapy;individual therapy  -MS       Row Name 03/14/25 2364          General Information    Patient Profile Reviewed yes  -MS     Existing Precautions/Restrictions fall   Exit alarm; NWB LUE (clavicular fx);  ? weight bearing RLE (spoke with nursing; Note left to MD for clarification)  -MS     Barriers to Rehab medically complex  -MS       Row Name 03/14/25 2384          Cognition    Orientation Status (Cognition) oriented x 3  -MS               User Key  (r) = Recorded By, (t) = Taken By, (c) = Cosigned By      Initials Name Provider Type    MS Jd Kowalski, PT Physical Therapist                   Mobility       Row Name 03/14/25 2463          Bed Mobility    Supine-Sit [place] (Bed Mobility) maximum assist (25% patient effort);2 person assist  -MS     Sit-Supine [place] (Bed Mobility) maximum assist (25% patient effort);2 person assist  -MS     Comment, (Bed Mobility) Once sitting EOB, pt. requires CGA x 1 for static sitting balance and Min. assist x 1 for dynamic sitting balance.  -MS       Row Name 03/14/25 7768          Transfers    Comment, (Transfers) Deferred attempts at sit <-> stand transfers due to RLE surgery and questionable  weight bearing status.  Pt. also with h/o partial L foot amputation.  -MS       Row Name 03/14/25 0950          Mobility    Left Upper Extremity (Weight-bearing Status) non weight-bearing (NWB)   -MS               User Key  (r) = Recorded By, (t) = Taken By, (c) = Cosigned By      Initials Name Provider Type    Jd Lord YOUNG, PT Physical Therapist                   Obj/Interventions       Row Name 03/14/25 0951          Range of Motion Comprehensive    Comment, General Range of Motion RUE (WFL's);  BLE (Imp. 25%)  -MS       Row Name 03/14/25 0951          Strength Comprehensive (MMT)    Comment, General Manual Muscle Testing (MMT) Assessment BLE (3-/5)  -MS       Row Name 03/14/25 0951          Motor Skills    Therapeutic Exercise --  BLE ther. ex. program x 10 reps completed (Hip Flexion, LAQ's)  -MS               User Key  (r) = Recorded By, (t) = Taken By, (c) = Cosigned By      Initials Name Provider Type    Jd Lord YOUNG, PT Physical Therapist                   Goals/Plan       Row Name 03/14/25 0953          Bed Mobility Goal 1 (PT)    Activity/Assistive Device (Bed Mobility Goal 1, PT) bed mobility activities, all  -MS     Garza Level/Cues Needed (Bed Mobility Goal 1, PT) minimum assist (75% or more patient effort)  -MS     Time Frame (Bed Mobility Goal 1, PT) long term goal (LTG);1 week  -MS       Row Name 03/14/25 0953          Transfer Goal 1 (PT)    Activity/Assistive Device (Transfer Goal 1, PT) transfers, all;sit-to-stand/stand-to-sit;bed-to-chair/chair-to-bed;walker, rolling  -MS     Garza Level/Cues Needed (Transfer Goal 1, PT) minimum assist (75% or more patient effort)  -MS     Time Frame (Transfer Goal 1, PT) long term goal (LTG);1 week  -MS       Row Name 03/14/25 0953          Gait Training Goal 1 (PT)    Activity/Assistive Device (Gait Training Goal 1, PT) gait (walking locomotion);walker, rolling  -MS     Garza Level (Gait Training Goal 1, PT) minimum assist  (75% or more patient effort)  -MS     Distance (Gait Training Goal 1, PT) 20 feet  -MS     Time Frame (Gait Training Goal 1, PT) long term goal (LTG);1 week  -MS     Strategies/Barriers (Gait Training Goal 1, PT) Pending weight bearing status RLE  -MS       Row Name 03/14/25 0953          Therapy Assessment/Plan (PT)    Planned Therapy Interventions (PT) balance training;bed mobility training;gait training;home exercise program;patient/family education;postural re-education;transfer training;strengthening;ROM (range of motion)  -MS               User Key  (r) = Recorded By, (t) = Taken By, (c) = Cosigned By      Initials Name Provider Type    Jd Lord, PT Physical Therapist                   Clinical Impression       Row Name 03/14/25 0952          Pain    Pretreatment Pain Rating 0/10 - no pain  -MS     Posttreatment Pain Rating 0/10 - no pain  -MS       Row Name 03/14/25 0952          Plan of Care Review    Plan of Care Reviewed With patient  -MS       Row Name 03/14/25 0952          Therapy Assessment/Plan (PT)    Rehab Potential (PT) fair  -MS     Criteria for Skilled Interventions Met (PT) skilled treatment is necessary  -MS     Therapy Frequency (PT) 6 times/wk  -MS       Row Name 03/14/25 0952          Positioning and Restraints    Pre-Treatment Position in bed  -MS     Post Treatment Position bed  -MS     In Bed notified nsg;supine;call light within reach;encouraged to call for assist;exit alarm on;RLE elevated;LLE elevated;R heel elevated;L heel elevated  -MS               User Key  (r) = Recorded By, (t) = Taken By, (c) = Cosigned By      Initials Name Provider Type    Jd Lord, PT Physical Therapist                   Outcome Measures       Row Name 03/14/25 0954          How much help from another person do you currently need...    Turning from your back to your side while in flat bed without using bedrails? 2  -MS     Moving from lying on back to sitting on the side of a flat bed  without bedrails? 2  -MS     Moving to and from a bed to a chair (including a wheelchair)? 1  -MS     Standing up from a chair using your arms (e.g., wheelchair, bedside chair)? 1  -MS     Climbing 3-5 steps with a railing? 1  -MS     To walk in hospital room? 1  -MS     AM-PAC 6 Clicks Score (PT) 8  -MS     Highest Level of Mobility Goal 3 --> Sit at edge of bed  -MS       Row Name 03/14/25 0954          Functional Assessment    Outcome Measure Options AM-PAC 6 Clicks Basic Mobility (PT)  -MS               User Key  (r) = Recorded By, (t) = Taken By, (c) = Cosigned By      Initials Name Provider Type    MS Jd Kowalski, PT Physical Therapist                                 Physical Therapy Education       Title: PT OT SLP Therapies (Done)       Topic: Physical Therapy (Done)       Point: Mobility training (Done)       Learning Progress Summary            Patient Acceptance, D,E, VU,NR by MS at 3/14/2025 0954    Acceptance, E, VU by LB at 3/9/2025 1858    Acceptance, E, VU by DB at 3/9/2025 1422    Acceptance, E, VU,NR by  at 3/6/2025 1313                      Point: Home exercise program (Done)       Learning Progress Summary            Patient Acceptance, D,E, VU,NR by MS at 3/14/2025 0954    Acceptance, E, VU by LB at 3/9/2025 1858    Acceptance, E, VU by DB at 3/9/2025 1422    Acceptance, E, VU,NR by  at 3/6/2025 1313                      Point: Body mechanics (Done)       Learning Progress Summary            Patient Acceptance, D,E, VU,NR by MS at 3/14/2025 0954    Acceptance, E, VU by LB at 3/9/2025 1858    Acceptance, E, VU by DB at 3/9/2025 1422    Acceptance, E, VU,NR by  at 3/6/2025 1313                      Point: Precautions (Done)       Learning Progress Summary            Patient Acceptance, D,E, VU,NR by MS at 3/14/2025 0954    Acceptance, E, VU by LB at 3/9/2025 1858    Acceptance, E, VU by DB at 3/9/2025 1422    Acceptance, E, VU,NR by  at 3/6/2025 5842                                       User Key       Initials Effective Dates Name Provider Type Discipline    MS 06/16/21 -  Jd Kowalski, PT Physical Therapist PT    DB 06/16/21 -  Colette Price PT Physical Therapist PT    LB 06/16/21 -  Neha Munguia, RN Registered Nurse Nurse     09/11/24 -  Jd Zapata PT Physical Therapist PT                  PT Recommendation and Plan  Planned Therapy Interventions (PT): balance training, bed mobility training, gait training, home exercise program, patient/family education, postural re-education, transfer training, strengthening, ROM (range of motion)  Outcome Evaluation: Pt. is an 79 year old Male who is s/p Right foot debridement and recent Left clavicular fx (NWB LUE). Pt. currently presents with decreased strength, decreased ROM, decreased balance, and decreased tolerance to functional activity.  This AM, pt. requires Max. assist x 2 for bed mobility.  Once sitting EOB, pt. requires CGA x 1 for static sitting balance and Min. assist x 1 for dynamic sitting balance. BLE ther. ex. program x 10 reps completed for general strengthening. Deferred sit <-> stand transfer attempts due to overall weakness and questionable weight bearing status RLE post surgery.  Pt. will benefit from skilled inpt. P.T. to address his functional deficits and to assist pt. in regaining his maximum level of independence with functional mobility.     Time Calculation:         PT Charges       Row Name 03/14/25 0958             Time Calculation    Start Time 0838  -MS      Stop Time 0903  -MS      Time Calculation (min) 25 min  -MS      PT Received On 03/14/25  -MS      PT - Next Appointment 03/15/25  -MS      PT Goal Re-Cert Due Date 03/21/25  -MS         Time Calculation- PT    Total Timed Code Minutes- PT 24 minute(s)  -MS                User Key  (r) = Recorded By, (t) = Taken By, (c) = Cosigned By      Initials Name Provider Type    MS Jd Kowalski, PT Physical Therapist                  Therapy Charges for  Today       Code Description Service Date Service Provider Modifiers Qty    [id number] HC PT THERAPEUTIC ACT EA 15 MIN 3/14/2025 Jd Kowalski, PT GP 2            PT G-Codes  Outcome Measure Options: AM-PAC 6 Clicks Basic Mobility (PT)  AM-PAC 6 Clicks Score (PT): 8  AM-PAC 6 Clicks Score (OT): 14  PT Discharge Summary  Anticipated Discharge Disposition (PT): skilled nursing facility    Jd Kowalski, PT  3/14/2025

## 2025-03-14 NOTE — THERAPY TREATMENT NOTE
Patient Name: Rock Maciel Jr.  : 1945    MRN: [id number]                              Today's Date: 3/14/2025       Admit Date: 3/3/2025    Visit Dx:     ICD-10-CM ICD-9-CM   1. Traumatic rhabdomyolysis, initial encounter  T79.6XXA 958.6   2. Acute renal failure superimposed on chronic kidney disease, unspecified acute renal failure type, unspecified CKD stage  N17.9 584.9    N18.9 585.9   3. Sepsis, due to unspecified organism, unspecified whether acute organ dysfunction present  A41.9 038.9     995.91   4. Pneumonia due to infectious organism, unspecified laterality, unspecified part of lung  J18.9 486   5. Hyperglycemia  R73.9 790.29   6. Anemia, unspecified type  D64.9 285.9   7. Closed displaced fracture of acromial end of left clavicle, initial encounter  S42.032A 810.03   8. Decreased activities of daily living (ADL)  Z78.9 V49.89   9. Diabetic foot infection  E11.628 250.80    L08.9 686.9   10. Acute osteomyelitis of right foot  M86.071 730.07   11. Stage 3b chronic kidney disease  N18.32 585.3   12. Acute kidney injury  N17.9 584.9   13. ATN (acute tubular necrosis)  N17.0 584.5   14. End stage renal disease  N18.6 585.6     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    Chronic venous insufficiency    Nonrheumatic aortic valve stenosis    Carotid stenosis, asymptomatic    Bradycardia    Hyperlipidemia    Bilateral carotid artery disease    Abnormal cardiovascular stress test    H/O aortic valve replacement with porcine valve    Charcot-Davida-Tooth disease-like deformity of foot    Amputated toe of right  foot    Fall    Non-traumatic rhabdomyolysis    S/P CABG x 2    CKD (chronic kidney disease) stage 3, GFR 30-59 ml/min    Rupture of left quadriceps tendon    Constipation    KILLIAN (acute kidney injury)    Wound of right leg    Anemia    Chronic diastolic (congestive) heart failure    Amputated toe of left foot    Gas gangrene    Cellulitis of left lower limb    Acquired absence of left foot    Bilateral lower extremity edema    Stage 3b chronic kidney disease    History of pneumonia    Atelectasis of both lungs    Abnormal pleural fluid    Pleural thickening    Atrial fibrillation, persistent    Chronic anticoagulation    Persistent atrial fibrillation    Bladder wall thickening    Hematuria    CKD (chronic kidney disease) stage 4, GFR 15-29 ml/min    Hypoxemia    Nocturnal hypoxemia    Status post left inguinal hernia repair, follow-up exam    Weakness of both lower extremities    Unsteady gait when walking    Wound, open, arm, forearm, right, subsequent encounter    Traumatic rhabdomyolysis    Closed displaced fracture of left clavicle    Pneumonia due to infectious organism    ATN (acute tubular necrosis)    Contusion of left knee    Acute osteomyelitis of right foot    Diabetic foot infection    MRSA (methicillin resistant Staphylococcus aureus) infection    Acute kidney injury     Past Medical History:   Diagnosis Date    Allergic rhinitis     Bronchitis     Coronary artery disease     Diabetes mellitus 2001    DM (diabetes mellitus)     Encounter for annual health examination 05/06/2014    Annual Health Assessment    Gout     Hiatal hernia     History of MRSA infection     RIGHT FOOT 2010    Hyperlipidemia     Hypertension     Murmur     Pneumothorax on right     Sleep apnea     pt wears CPAP at night    Wellness examination 06/24/2015    Annual Wellness Visit     Past Surgical History:   Procedure Laterality Date    ARTERY SURGERY Bilateral     carotid    BONE EXCISION LEG Right 3/10/2025    Procedure: BONE  EXCISION LOWER EXTERMITY, RIGHT;  Surgeon: Jimenez Mchugh DPM;  Location: [facility] MAIN OR;  Service: Podiatry;  Laterality: Right;    CARDIAC CATHETERIZATION N/A 7/20/2018    Procedure: Left Heart Cath;  Surgeon: Jo Toney MD;  Location: [facility] CATH INVASIVE LOCATION;  Service: Cardiovascular    CARDIAC CATHETERIZATION N/A 7/20/2018    Procedure: Coronary angiography;  Surgeon: Jo Toney MD;  Location: [facility] CATH INVASIVE LOCATION;  Service: Cardiovascular    CAROTID ENDARTERECTOMY Bilateral     COLONOSCOPY  2008    CORONARY ARTERY BYPASS GRAFT WITH AORTIC VALVE REPAIR/REPLACEMENT N/A 7/23/2018    Procedure: INTRAOPERATIVE ALEX, MIDLINE STERNOTOMY, CORONARY ARTERY BYPASS GRAFTING X 3 USING ENDOSCOPICALLY HARVESTED LEFT GREATER SAPHENOUS VEIN,  AORTIC VALVE REPLACEMENT USING 25MM LOPEZ II ULTRA PORCINE VALVE, PRP;  Surgeon: Phong Posey MD;  Location: [facility] MAIN OR;  Service: Cardiothoracic    FOOT SURGERY Right 2010    5th digit removal    FOOT SURGERY Left 2011    1 digit removed    INCISION AND DRAINAGE LEG Right 3/28/2020    Procedure: DEBRIDMENT OF RIGHT CALF;  Surgeon: Ross Pineda MD;  Location: [facility] MAIN OR;  Service: Vascular;  Laterality: Right;    INCISION AND DRAINAGE LEG Right 3/10/2025    Procedure: INCISION AND DRAINAGE LOWER EXTREMITY;  Surgeon: Jimenez Mchugh DPM;  Location: [facility] MAIN OR;  Service: Podiatry;  Laterality: Right;    INGUINAL HERNIA REPAIR Left 11/29/2024    Procedure: INGUINAL HERNIA REPAIR LAPAROSCOPIC WITH DAVINCI ROBOT;  Surgeon: Phong Lazo MD;  Location: [facility] MAIN OR;  Service: Robotics - DaVinci;  Laterality: Left;    INSERTION HEMODIALYSIS CATHETER N/A 3/11/2025    Procedure: HEMODIALYSIS CATHETER INSERTION;  Surgeon: Jeaneth Bonds MD;  Location: [facility] MAIN OR;  Service: Vascular;  Laterality: N/A;    QUADRICEPS TENDON REPAIR Left 5/14/2019    Procedure: Left QUADRICEPS TENDON REPAIR;  Surgeon: Dale  Camilo REAL MD;  Location: [facility];  Service: Orthopedics    THORACENTESIS Right 11/21/2016    THORACOSCOPY Right 5/8/2017    Procedure: BRONCHOSCOPY, RIGHT VAT,  TOTAL DECORTICATION RIGHT LUNG, PLEURAL BX, PLACEMENT SUBPLEURAL PAIN CAATHETERS X2;  Surgeon: Donald Orlando III, MD;  Location: [facility] OR;  Service:     TONSILECTOMY, ADENOIDECTOMY, BILATERAL MYRINGOTOMY AND TUBES      WOUND DEBRIDEMENT Right 3/13/2025    Procedure: DEBRIDEMENT FOOT, RIGHT;  Surgeon: Jimenez Mchugh DPM;  Location: [facility];  Service: Podiatry;  Laterality: Right;      General Information       Row Name 03/14/25 0917          OT Time and Intention    Subjective Information complains of;pain;weakness  -     Document Type therapy note (daily note)  -     Mode of Treatment co-treatment;occupational therapy  -     Patient Effort adequate  -     Symptoms Noted During/After Treatment increased pain  -       Row Name 03/14/25 0917          General Information    Patient Profile Reviewed yes  -     Prior Level of Function independent:  -     Existing Precautions/Restrictions fall;oxygen therapy device and L/min  2.5L O2  -     Barriers to Rehab previous functional deficit;medically complex  -       Row Name 03/14/25 0917          Living Environment    Current Living Arrangements home  -     People in Home alone  -       Row Name 03/14/25 0917          Cognition    Orientation Status (Cognition) oriented x 4  -               User Key  (r) = Recorded By, (t) = Taken By, (c) = Cosigned By      Initials Name Provider Type     Denise Gaviria OT Occupational Therapist                     Mobility/ADL's       Row Name 03/14/25 0918          Bed Mobility    Bed Mobility bed mobility (all) activities  -     All Activities, [place] (Bed Mobility) maximum assist (25% patient effort);2 person assist  -     Supine-Sit [place] (Bed Mobility) maximum assist (25% patient effort);2 person assist  -      Sit-Supine [place] (Bed Mobility) maximum assist (25% patient effort);2 person assist  -LH       Row Name 03/14/25 0918          Bed-Chair Transfer    Bed-Chair B[place] (Transfers) not tested  -LH       Row Name 03/14/25 0918          Sit-Stand Transfer    Sit-Stand [place] (Transfers) not tested  -LH       Row Name 03/14/25 0918          Functional Mobility    Functional Mobility- Ind. Level not tested  -     Patient was able to Ambulate no, other medical factors prevent ambulation  -     Reason Patient was unable to Ambulate Excessive Weakness  -LH       Row Name 03/14/25 0918          Activities of Daily Living    BADL Assessment/Intervention grooming  -LH       Row Name 03/14/25 0918          Mobility    Extremity Weight-bearing Status left upper extremity  -     Left Upper Extremity (Weight-bearing Status) non weight-bearing (NWB)  -LH       Row Name 03/14/25 0918          Grooming Assessment/Training    Bland Level (Grooming) grooming skills;hair care, combing/brushing;oral care regimen;shave face;standby assist;minimum assist (75% patient effort)  -     Assistive Devices (Grooming) electric razor  -     Position (Grooming) edge of bed sitting  Requiring min/mod A to maintain sitting balance  -               User Key  (r) = Recorded By, (t) = Taken By, (c) = Cosigned By      Initials Name Provider Type     Denise Gaviria OT Occupational Therapist                   Obj/Interventions       [facility] Name 03/14/25 1025          Sensory Assessment (Somatosensory)    Sensory Assessment Reports impaired sensation in BLEs  -LH       Row Name 03/14/25 1025          Vision Assessment/Intervention    Visual Impairment/Limitations WFL  -LH       Row Name 03/14/25 1025          Range of Motion Comprehensive    General Range of Motion upper extremity range of motion deficits identified  -LH       Row Name 03/14/25 1025          Balance    Balance Assessment sitting static balance;sitting  dynamic balance  -     Static Sitting Balance standby assist;minimal assist  -     Dynamic Sitting Balance minimal assist;contact guard  -     Position, Sitting Balance unsupported;sitting edge of bed  -     Balance Interventions sitting;occupation based/functional task  -               User Key  (r) = Recorded By, (t) = Taken By, (c) = Cosigned By      Initials Name Provider Type     Denise Gaviria, KHARI Occupational Therapist                   Goals/Plan    No documentation.                  Clinical Impression       [facility] Name 03/14/25 1026          Pain Assessment    Pretreatment Pain Rating 0/10 - no pain  -     Pre/Posttreatment Pain Comment Generalized complaint of pain  -[facility] Name 03/14/25 1026          Plan of Care Review    Plan of Care Reviewed With patient  -     Outcome Evaluation Patient reports general pain and soreness today. He remains with limited ROM in LUE. He requires max Ax2 bed mobility to get to EOB, and able to sit EOB with intermittent min A for support, with posterior lean. He performed grooming/hygiene tasks while seated EOB, requires min A with hair care, set up with oral hygiene and shaving with electric razor. Patient requires max Ax2 to return to supine and for positioning in bed. Patient will continue to benefit from skilled OT , recommend  IRF vs SNF at [facility].  -[facility] Name 03/14/25 1026          Therapy Assessment/Plan (OT)    Rehab Potential (OT) good  -     Criteria for Skilled Therapeutic Interventions Met (OT) yes;meets criteria;skilled treatment is necessary  -     Therapy Frequency (OT) 5 times/wk  -[facility] Name 03/14/25 1026          Therapy Plan Review/Discharge Plan (OT)    Anticipated Discharge Disposition (OT) inpatient rehabilitation facility  -[facility] Name 03/14/25 1026          Positioning and Restraints    In Bed notified nsg;call light within reach;encouraged to call for assist;exit alarm on;fowlers  -               User  Key  (r) = Recorded By, (t) = Taken By, (c) = Cosigned By      Initials Name Provider Type     Denise Gaviria, KHARI Occupational Therapist                   Outcome Measures       Row Name 03/14/25 1028          How much help from another is currently needed...    Putting on and taking off regular lower body clothing? 2  -LH     Bathing (including washing, rinsing, and drying) 2  -LH     Toileting (which includes using toilet bed pan or urinal) 1  -LH     Putting on and taking off regular upper body clothing 2  -LH     Taking care of personal grooming (such as brushing teeth) 3  -LH     Eating meals 4  -LH     AM-PAC 6 Clicks Score (OT) 14  -       Row Name 03/14/25 0954          How much help from another person do you currently need...    Turning from your back to your side while in flat bed without using bedrails? 2  -MS     Moving from lying on back to sitting on the side of a flat bed without bedrails? 2  -MS     Moving to and from a bed to a chair (including a wheelchair)? 1  -MS     Standing up from a chair using your arms (e.g., wheelchair, bedside chair)? 1  -MS     Climbing 3-5 steps with a railing? 1  -MS     To walk in hospital room? 1  -MS     AM-PAC 6 Clicks Score (PT) 8  -MS     Highest Level of Mobility Goal 3 --> Sit at edge of bed  -MS       Row Name 03/14/25 0954          Functional Assessment    Outcome Measure Options AM-PAC 6 Clicks Basic Mobility (PT)  -MS               User Key  (r) = Recorded By, (t) = Taken By, (c) = Cosigned By      Initials Name Provider Type    Jd Lord, PT Physical Therapist     Denise Gaviria, KHARI Occupational Therapist                      OT Recommendation and Plan  Therapy Frequency (OT): 5 times/wk  Plan of Care Review  Plan of Care Reviewed With: patient  Outcome Evaluation: Patient reports general pain and soreness today. He remains with limited ROM in LUE. He requires max Ax2 bed mobility to get to EOB, and able to sit EOB with intermittent min A for  support, with posterior lean. He performed grooming/hygiene tasks while seated EOB, requires min A with hair care, set up with oral hygiene and shaving with electric razor. Patient requires max Ax2 to return to supine and for positioning in bed. Patient will continue to benefit from skilled OT , recommend  IRF vs SNF at acute [place].     Time Calculation:         Time Calculation- OT       Row Name 03/14/25 1029             Time Calculation- OT    OT Start Time 0839  -      OT Stop Time 0902  -      OT Time Calculation (min) 23 min  -      Total Timed Code Minutes- OT 23 minute(s)  -      OT Received On 03/14/25  -      OT - Next Appointment 03/17/25  -         Timed Charges    38571 - OT Self Care/Mgmt Minutes 23  -         Total Minutes    Timed Charges Total Minutes 23  -       Total Minutes 23  -                User Key  (r) = Recorded By, (t) = Taken By, (c) = Cosigned By      Initials Name Provider Type     Denise Gaviria, OT Occupational Therapist                  Therapy Charges for Today       Code Description Service Date Service Provider Modifiers Qty    [id number]  OT SELF CARE/MGMT/TRAIN EA 15 MIN 3/14/2025 Denise Gaviria OT GO 2                 Denise Gaviria OT  3/14/2025

## 2025-03-14 NOTE — PLAN OF CARE
Goal Outcome Evaluation:  Plan of Care Reviewed With: patient           Outcome Evaluation: Pt. is an 79 year old Male who is s/p Right foot debridement and recent Left clavicular fx (NWB LUE). Pt. currently presents with decreased strength, decreased ROM, decreased balance, and decreased tolerance to functional activity.  This AM, pt. requires Max. assist x 2 for bed mobility.  Once sitting EOB, pt. requires CGA x 1 for static sitting balance and Min. assist x 1 for dynamic sitting balance. BLE ther. ex. program x 10 reps completed for general strengthening. Deferred sit <-> stand transfer attempts due to overall weakness and questionable weight bearing status RLE post surgery.  Pt. will benefit from skilled inpt. P.T. to address his functional deficits and to assist pt. in regaining his maximum level of independence with functional mobility.    Anticipated Discharge Disposition (PT): skilled nursing facility

## 2025-03-14 NOTE — NURSING NOTE
03/14/25 0921   Wound 03/08/25 2230 Right gluteal   Placement Date/Time: 03/08/25 2230   Present on Original Admission: No  Side: Right  Location: gluteal   Dressing Appearance open to air   Base clean;pink   Periwound intact   Edges irregular   Drainage Amount none     WOCN: Follow up right buttock wound. Has much improved, Is not over bony prominence. Is not pressure, friction. Needs assist of 2 to turn. Currently on low air loss  mattress. Continue ointment.

## 2025-03-14 NOTE — NURSING NOTE
HD complete, 2 liters removed. 1 unit of PRBC's given. Post /56 HR 63 T 98. Patient tolerated treatment well.

## 2025-03-14 NOTE — PLAN OF CARE
Goal Outcome Evaluation:  Plan of Care Reviewed With: patient           Outcome Evaluation: Patient reports general pain and soreness today. He remains with limited ROM in LUE. He requires max Ax2 bed mobility to get to EOB, and able to sit EOB with intermittent min A for support, with posterior lean. He performed grooming/hygiene tasks while seated EOB, requires min A with hair care, set up with oral hygiene and shaving with electric razor. Patient requires max Ax2 to return to supine and for positioning in bed. Patient will continue to benefit from skilled OT , recommend  IRF vs SNF at [facility].    Anticipated Discharge Disposition (OT): inpatient rehabilitation facility

## 2025-03-14 NOTE — PROGRESS NOTES
Name: Rock Maciel Jr. ADMIT: 3/3/2025   : 1945  PCP: Denise Dickson APRN    MRN: [id number] LOS: 11 days   AGE/SEX: 79 y.o. male  ROOM: [facility]     Subjective   Subjective   Resting in bed. No family present. States he hasn't had much of an appetite and the only thing that tastes good is crackers and peanut butter. He denies any current chest pain, dyspnea, fever or chills. No nausea or vomiting. Had a BM yesterday but having some gas today. He states is tolerating HD okay and having more treatment today.     Objective   Objective   Vital Signs  Temp:  [97.5 °F (36.4 °C)-98.2 °F (36.8 °C)] 98.2 °F (36.8 °C)  Heart Rate:  [58-67] 60  Resp:  [18-20] 18  BP: (130-167)/(43-59) 155/59  SpO2:  [96 %-98 %] 97 %  on  Flow (L/min) (Oxygen Therapy):  [2] 2;   Device (Oxygen Therapy): nasal cannula  Body mass index is 37.03 kg/m².    Physical Exam  Vitals and nursing note reviewed.   Constitutional:       Appearance: He is obese. He is ill-appearing. He is not toxic-appearing.   Cardiovascular:      Rate and Rhythm: Normal rate and regular rhythm.      Pulses: Normal pulses.   Pulmonary:      Effort: Pulmonary effort is normal. No respiratory distress.      Comments: Diminished, on 2 L  Abdominal:      General: Bowel sounds are normal. There is no distension.      Palpations: Abdomen is soft.      Tenderness: There is no abdominal tenderness.   Musculoskeletal:         General: Swelling present. Normal range of motion.      Comments: Left foot amputation noted   Skin:     General: Skin is warm and dry.      Comments: Right chest HD cath in place, right foot wrapped   Neurological:      General: No focal deficit present.      Mental Status: He is alert and oriented to person, place, and time.      Sensory: No sensory deficit.      Motor: Weakness present.      Coordination: Coordination normal.   Psychiatric:         Mood and Affect: Mood normal.         Behavior: Behavior normal.       Results Review:        I reviewed the patient's new clinical results.  Results from last 7 days   Lab Units 03/14/25 0518 03/13/25 0452 03/12/25 0727 03/11/25  0518   WBC 10*3/mm3 9.21 8.65 9.14 9.23   HEMOGLOBIN g/dL 7.3* 7.7* 6.8* 7.0*   PLATELETS 10*3/mm3 268 292 274 263     Results from last 7 days   Lab Units 03/14/25 0518 03/13/25 0452 03/12/25 0727 03/11/25 0518   SODIUM mmol/L 138 139 134* 136   POTASSIUM mmol/L 4.1 4.5 5.2 4.9   CHLORIDE mmol/L 103 103 99 100   CO2 mmol/L 21.0* 20.0* 20.7* 21.0*   BUN mg/dL 64* 84* 111* 107*   CREATININE mg/dL 5.76* 6.59* 7.39* 6.67*   GLUCOSE mg/dL 106* 130* 152* 146*   Estimated Creatinine Clearance: 14.3 mL/min (A) (by C-G formula based on SCr of 5.76 mg/dL (H)).  Results from last 7 days   Lab Units 03/14/25 0518   ALBUMIN g/dL 2.4*     Results from last 7 days   Lab Units 03/14/25 0518 03/13/25 0452 03/12/25 0727 03/11/25 0518   CALCIUM mg/dL 7.6* 7.8* 8.2* 8.0*   ALBUMIN g/dL 2.4*  --   --   --    PHOSPHORUS mg/dL 6.5*  --   --   --        Glucose   Date/Time Value Ref Range Status   03/14/2025 1103 219 (H) 70 - 130 mg/dL Final   03/14/2025 0632 112 70 - 130 mg/dL Final   03/13/2025 2034 181 (H) 70 - 130 mg/dL Final   03/13/2025 1713 139 (H) 70 - 130 mg/dL Final   03/13/2025 1107 162 (H) 70 - 130 mg/dL Final   03/13/2025 0613 135 (H) 70 - 130 mg/dL Final   03/12/2025 2134 201 (H) 70 - 130 mg/dL Final       amiodarone, 200 mg, Oral, Daily  ampicillin-sulbactam, 3 g, Intravenous, Q24H  [Held by provider] apixaban, 2.5 mg, Oral, BID  vitamin C, 250 mg, Oral, Daily  atorvastatin, 20 mg, Oral, Nightly  bumetanide, 2 mg, Oral, BID Diuretics  cholecalciferol, 1,000 Units, Oral, Daily  [Held by provider] clopidogrel, 75 mg, Oral, Daily  DAPTOmycin, 8 mg/kg (Adjusted), Intravenous, Once  Pharmacy to Dose daptomycin (CUBICIN), , Not Applicable, Daily  epoetin vince/vince-epbx, 10,000 Units, Intravenous, Once per day on Monday Wednesday Friday  hydrALAZINE, 10 mg, Oral, TID  insulin  glargine, 5 Units, Subcutaneous, Nightly  insulin lispro, 2-9 Units, Subcutaneous, 4x Daily AC & at Bedtime  insulin lispro, 6 Units, Subcutaneous, TID With Meals  linagliptin, 5 mg, Oral, Daily  melatonin, 2.5 mg, Oral, Nightly  Menthol-Zinc Oxide, 1 Application, Topical, Q12H  metoprolol succinate XL, 25 mg, Oral, Daily  pantoprazole, 40 mg, Oral, Q AM  sodium chloride, 3 mL, Intravenous, Q12H  tamsulosin, 0.4 mg, Oral, Daily      Pharmacy Consult - Pharmacy to dose,     Diet: Regular/House, Cardiac, Diabetic, Renal; Healthy Heart (2-3 Na+); Consistent Carbohydrate; Low Potassium, Low Phosphorus; Fluid Consistency: Thin (IDDSI 0)       Assessment/Plan     Active Hospital Problems    Diagnosis  POA    **Traumatic rhabdomyolysis [T79.6XXA]  Yes    MRSA (methicillin resistant Staphylococcus aureus) infection [A49.02]  Yes    Contusion of left knee [S80.02XA]  Yes    Acute osteomyelitis of right foot [M86.071]  Yes    Diabetic foot infection [E11.628, L08.9]  Yes    ATN (acute tubular necrosis) [N17.0]  Yes    Closed displaced fracture of left clavicle [S42.002A]  Yes    Pneumonia due to infectious organism [J18.9]  Yes    Acute kidney injury [N17.9]  Unknown    Persistent atrial fibrillation [I48.19]  Yes    Chronic anticoagulation [Z79.01]  Not Applicable    Stage 3b chronic kidney disease [N18.32]  Yes    Chronic diastolic (congestive) heart failure [I50.32]  Yes    KILLIAN (acute kidney injury) [N17.9]  Yes    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     79 year old man who presented following a mechanical fall leading to rhabdomyolysis,  left knee contusion with left knee effusion and meniscus tear, and a clavicular fracture. He was also found to have KILLIAN on CKD 4, pneumonia, osteomyelitis and abscess/cellulitis of the right foot.     Traumatic rhabdomyolysis-resolved with IVF.   KILLIAN on CKD 4-prerenal due to ATN. Now s/p tunneled dialysis cathter placement and is now on HD  Post procedural right apical pneumothorax-tiny. Gone on repeat chest xray  Pneumonia-completed a course of zosyn while hospitalized  Osteomyelitis and abscess of the right foot-s/p I&D and partial 4th metatarsal excision on 3/10/25 by Dr. Mchugh and then additional debridement of nonviable necrotic tissue on 3/13/25. Currently on daptomycin and unasyn per ID with final antibiotic plans pending  Acute urinary retention/BPH-abdi catheter placed. Urology evaluated and recommended flomax and a voiding trial on the morning of discharge.  Left clavicular fracture-orthopedic surgery evaluated and recommended a sling as tolerated, ice, pain control, and to avoid forward flexion for now  Left knee contusion with left knee effusion and meniscus tear-orthopedic surgery recommends a knee brace/immobilizer for at least 3 weeks and then begin range of motion exercises with PT  Anemia of chronic disease/CKD-hgb down to 7.3 today, last transfused 3/12. Discussed with Dr. Kowalski and will order 1 unit of PRBC for him to receive in HD today.  Chronic afib-amiodarone. Eliquis is held for procedures, but cleared to restart by Dr. Mchugh-> will resume.  Chronic diastolic heart failure-bumex, though not really making much urine at this point. Volume is largely being managed with HD  Coronary artery disease-plavix is held acutely for procedures, will restart as this has been okayed with Dr. Mchugh. Statin/bb. Cardiology followed and signed off 3/13 with plans to follow up 4/8 in office.  Type 2 diabetes-A1c is 6. Glucose is at goal acutely on current regimen  Essential hypertension-adequate control  acutely  Hyperlipidemia-statin  Eliquis (home med) for DVT prophylaxis.   Full code.  Discussed with patient, nurse and Dr. Kowalski as well as Dr. Mchugh.  Anticipate discharge to SNU facility next week. Working on placement with HD access.        ZEINA Fraga  [facility]  03/14/25  14:39 EDT

## 2025-03-14 NOTE — CASE MANAGEMENT/SOCIAL WORK
Continued Stay Note  [facility]     Patient Name: Rock Maciel Jr.  MRN: [id number]  Today's Date: 3/14/2025    Admit Date: 3/3/2025    Plan: SNF with onsite HD referrals pending   Discharge Plan       Row Name 03/14/25 1401       Plan    Plan SNF with onsite HD referrals pending    Patient/Family in Agreement with Plan yes    Plan Comments Tabitha/Erwin notified [facility] unable to accept at her buildings with onsite HD d/t insurance. Mirella/Signature notified [facility] pt has been accepted and bed and HD chair available at [facility]. Jessica/Alton notified [facility] pt is still under review and will notify [facility] when final decision has been made. Leonardo AYON/[facility]                   Discharge Codes    No documentation.                 Expected Discharge Date and Time       Expected Discharge Date Expected Discharge Time    Mar 18, 2025               Pauly Martinez RN

## 2025-03-14 NOTE — NURSING NOTE
"Addressed abdi catheter per Quality RN. Per 3/13 physician note, \"Acute urinary retention/BPH-abdi catheter placed. Urology evaluated and recommended flomax and a voiding trial on the morning of discharge.\". Updated on plan of care. Will continue with abdi cathter as per physician documentation. Strict I/O.   "

## 2025-03-14 NOTE — PROGRESS NOTES
"      FOLLOW UP NOTE      Name: Rock Maciel Jr. ADMIT: 3/3/2025   : 1945  PCP: Denise Dickson APRN    MRN: [id number] LOS: 11 days   AGE/SEX: 79 y.o. male  ROOM: [facility]     Date of Service: 3/14/2025                           CHIEF COMPLIANTS / REASON FOR FOLLOW UP                Subjective:    Tolerated HD well yesterday. Reports appetite marginally improved.     Review of Systems:     Negative except as above       OBJECTIVE                                                                        Exam:  /59 (BP Location: Left arm, Patient Position: Lying)   Pulse 60   Temp 98.2 °F (36.8 °C) (Oral)   Resp 18   Ht 185.4 cm (73\")   Wt 127 kg (280 lb 10.3 oz)   SpO2 97%   BMI 37.03 kg/m²   Intake/Output last 3 shifts:  I/O last 3 completed shifts:  In: 788.5 [P.O.:480; Blood:308.5]  Out: 1100 [Urine:100]  Intake/Output this shift:  No intake/output data recorded.    General Appearance: Chronically ill, pale appearing      Lungs:   Lungs anteriorly clear  Heart: RRR  Abdomen:  Soft, nontender  Extremities: Extremities wrapped, 1+ edema, knee effusion noted  Neurologic:   Alert and oriented  RIJ TDC      Scheduled Meds:amiodarone, 200 mg, Oral, Daily  ampicillin-sulbactam, 3 g, Intravenous, Q24H  [Held by provider] apixaban, 2.5 mg, Oral, BID  vitamin C, 250 mg, Oral, Daily  atorvastatin, 20 mg, Oral, Nightly  bumetanide, 2 mg, Oral, BID Diuretics  cholecalciferol, 1,000 Units, Oral, Daily  [Held by provider] clopidogrel, 75 mg, Oral, Daily  Pharmacy to Dose daptomycin (CUBICIN), , Not Applicable, Daily  epoetin vince/vince-epbx, 10,000 Units, Intravenous, Once per day on   hydrALAZINE, 10 mg, Oral, TID  insulin glargine, 5 Units, Subcutaneous, Nightly  insulin lispro, 2-9 Units, Subcutaneous, 4x Daily AC & at Bedtime  insulin lispro, 6 Units, Subcutaneous, TID With Meals  linagliptin, 5 mg, Oral, Daily  melatonin, 2.5 mg, Oral, Nightly  Menthol-Zinc Oxide, 1 Application, " Topical, Q12H  metoprolol succinate XL, 25 mg, Oral, Daily  pantoprazole, 40 mg, Oral, Q AM  sodium chloride, 3 mL, Intravenous, Q12H  tamsulosin, 0.4 mg, Oral, Daily      Continuous Infusions:Pharmacy Consult - Pharmacy to dose,         PRN Meds:  artificial tears    senna-docusate sodium **AND** polyethylene glycol **AND** bisacodyl **AND** bisacodyl    cadexomer iodine    dextrose    dextrose    famotidine    glucagon (human recombinant)    heparin (porcine)    HYDROcodone-acetaminophen    nitroglycerin    ondansetron ODT **OR** ondansetron    Pharmacy Consult - Pharmacy to dose    sodium chloride    sodium chloride         Data Review:                                                                           Labs reviewed        Imaging:                                                                           Radiology reviewed           ASSESSMENT:                                                                                Traumatic rhabdomyolysis    Arteriosclerosis of coronary artery    Essential hypertension    Type 2 diabetes mellitus with circulatory disorder, without long-term current use of insulin    Hyperlipidemia    Charcot-Davida-Tooth disease-like deformity of foot    Fall    S/P CABG x 2    KILLIAN (acute kidney injury)    Chronic diastolic (congestive) heart failure    Stage 3b chronic kidney disease    Chronic anticoagulation    Persistent atrial fibrillation    Closed displaced fracture of left clavicle    Pneumonia due to infectious organism    ATN (acute tubular necrosis)    Contusion of left knee    Acute osteomyelitis of right foot    Diabetic foot infection    MRSA (methicillin resistant Staphylococcus aureus) infection    Acute kidney injury         KILLIAN: likely ATN related to rhabdomyolysis, prerenal insult related to diuretics etc.  Needing RRT since 3/11/2025. Reason for GFR decline likely multifactorial, some ATN, ongoing osteomyelitis etc.   RIJ TDC placed 3/11/2025.  First HD 3/12.     Lower extremity osteomyelitis/cellulitis  CKD stage III-IV: Baseline creatinine  1.8-2.6 mg/dL with baseline GFR around 25 mL/min  Acute urinary retention requiring Johnson catheter placement.  Clavicle fracture  A-fib with controlled rates.  History of HFpEF with pulmonary hypertension: Slightly volume expanded.  Severe anemia in CKD:          PLAN:                                                                            Plan for HD today.  Tolerating well so far.  Likely MWF HD from next week.  Podiatry following for foot osteomyelitis.  Antibiotics dosed for  CrCl<15 mL/min.  Follow-up with ID.  Continue EPO with HD and transfuse as necessary.    Will need outpatient HD arranged.  Follow-up with case management.  Restrict dietary phosphorus.    Tiesha Mccrary MD  [facility]  3/14/2025  10:02 EDT

## 2025-03-14 NOTE — PROGRESS NOTES
[facility] Clinical Pharmacy Services: Daptomycin Consult      Rock Benitoelisha Burgos has a pharmacy consult to dose daptomycin per Dr Denny's request.      Indication: MRSA             -- MRSA+ wound cx, ID wishes to avoid vanc due to renal function      3/13/25  I spoke with Eduin regarding vancomycin vs daptomycin given pt now requiring HD, Dr Denny ok with either option vancomycin or dapto, but deferring to nephrology to determine. I spoke with Dr Mccrary (nephro) who prefers to avoid vancomycin in hopes of renal recovery.   Bernice Mei, ScotD, BCPS  3/13/2025 14:19 EDT          Assessment/Plan:    Discussed with DONI Major pharmacist.  Since we do not have a more concrete HD schedule yet we will continue to dose Dapto intermittently.  For now plan is to give 6 mg/kg post HD if session is less than 2 hours and 8 mg/kg if more than 2 hours.  Will need to revisit Dapto regimen if pt is getting daily dose for a week straight or HD plan is in place.    Per dialysis nurse, yesterday's session was around 2.5 hours. Will give dapto 8 mg/kg today.            Edu Rodriguez, ScotD

## 2025-03-14 NOTE — NURSING NOTE
To transfuse one unit of blood in dialysis per LARON Bates. Orders have been placed and GABO Stroud in dialysis aware and will administer.

## 2025-03-15 LAB
ANION GAP SERPL CALCULATED.3IONS-SCNC: 11.6 MMOL/L (ref 5–15)
BACTERIA SPEC ANAEROBE CULT: NORMAL
BH BB BLOOD EXPIRATION DATE: NORMAL
BH BB BLOOD TYPE BARCODE: 5100
BH BB DISPENSE STATUS: NORMAL
BH BB PRODUCT CODE: NORMAL
BH BB UNIT NUMBER: NORMAL
BUN SERPL-MCNC: 33 MG/DL (ref 8–23)
BUN/CREAT SERPL: 9 (ref 7–25)
CALCIUM SPEC-SCNC: 8 MG/DL (ref 8.6–10.5)
CHLORIDE SERPL-SCNC: 100 MMOL/L (ref 98–107)
CO2 SERPL-SCNC: 22.4 MMOL/L (ref 22–29)
CREAT SERPL-MCNC: 3.66 MG/DL (ref 0.76–1.27)
CROSSMATCH INTERPRETATION: NORMAL
DEPRECATED RDW RBC AUTO: 53.1 FL (ref 37–54)
EGFRCR SERPLBLD CKD-EPI 2021: 16 ML/MIN/1.73
ERYTHROCYTE [DISTWIDTH] IN BLOOD BY AUTOMATED COUNT: 15.2 % (ref 12.3–15.4)
GLUCOSE BLDC GLUCOMTR-MCNC: 138 MG/DL (ref 70–130)
GLUCOSE BLDC GLUCOMTR-MCNC: 140 MG/DL (ref 70–130)
GLUCOSE BLDC GLUCOMTR-MCNC: 150 MG/DL (ref 70–130)
GLUCOSE BLDC GLUCOMTR-MCNC: 157 MG/DL (ref 70–130)
GLUCOSE SERPL-MCNC: 145 MG/DL (ref 65–99)
HCT VFR BLD AUTO: 29.8 % (ref 37.5–51)
HGB BLD-MCNC: 9 G/DL (ref 13–17.7)
MCH RBC QN AUTO: 28.6 PG (ref 26.6–33)
MCHC RBC AUTO-ENTMCNC: 30.2 G/DL (ref 31.5–35.7)
MCV RBC AUTO: 94.6 FL (ref 79–97)
PLATELET # BLD AUTO: 293 10*3/MM3 (ref 140–450)
PMV BLD AUTO: 9.7 FL (ref 6–12)
POTASSIUM SERPL-SCNC: 4.1 MMOL/L (ref 3.5–5.2)
RBC # BLD AUTO: 3.15 10*6/MM3 (ref 4.14–5.8)
SODIUM SERPL-SCNC: 134 MMOL/L (ref 136–145)
UNIT  ABO: NORMAL
UNIT  RH: NORMAL
WBC NRBC COR # BLD AUTO: 10.97 10*3/MM3 (ref 3.4–10.8)

## 2025-03-15 PROCEDURE — 82948 REAGENT STRIP/BLOOD GLUCOSE: CPT

## 2025-03-15 PROCEDURE — 80048 BASIC METABOLIC PNL TOTAL CA: CPT | Performed by: NURSE PRACTITIONER

## 2025-03-15 PROCEDURE — 63710000001 INSULIN GLARGINE PER 5 UNITS: Performed by: PODIATRIST

## 2025-03-15 PROCEDURE — 63710000001 INSULIN LISPRO (HUMAN) PER 5 UNITS: Performed by: PODIATRIST

## 2025-03-15 PROCEDURE — 25010000002 AMPICILLIN-SULBACTAM PER 1.5 G: Performed by: INTERNAL MEDICINE

## 2025-03-15 PROCEDURE — 85027 COMPLETE CBC AUTOMATED: CPT | Performed by: NURSE PRACTITIONER

## 2025-03-15 RX ADMIN — METOPROLOL SUCCINATE 25 MG: 25 TABLET, EXTENDED RELEASE ORAL at 09:19

## 2025-03-15 RX ADMIN — APIXABAN 2.5 MG: 2.5 TABLET, FILM COATED ORAL at 20:57

## 2025-03-15 RX ADMIN — BUMETANIDE 2 MG: 1 TABLET ORAL at 16:58

## 2025-03-15 RX ADMIN — INSULIN GLARGINE 5 UNITS: 100 INJECTION, SOLUTION SUBCUTANEOUS at 20:57

## 2025-03-15 RX ADMIN — Medication 1000 UNITS: at 09:20

## 2025-03-15 RX ADMIN — APIXABAN 2.5 MG: 2.5 TABLET, FILM COATED ORAL at 09:19

## 2025-03-15 RX ADMIN — PANTOPRAZOLE SODIUM 40 MG: 40 TABLET, DELAYED RELEASE ORAL at 06:21

## 2025-03-15 RX ADMIN — MENTHOL, ZINC OXIDE 1 APPLICATION: .44; 20.6 OINTMENT TOPICAL at 20:58

## 2025-03-15 RX ADMIN — Medication 2.5 MG: at 20:57

## 2025-03-15 RX ADMIN — TAMSULOSIN HYDROCHLORIDE 0.4 MG: 0.4 CAPSULE ORAL at 09:19

## 2025-03-15 RX ADMIN — LINAGLIPTIN 5 MG: 5 TABLET, FILM COATED ORAL at 09:19

## 2025-03-15 RX ADMIN — INSULIN LISPRO 6 UNITS: 100 INJECTION, SOLUTION INTRAVENOUS; SUBCUTANEOUS at 16:56

## 2025-03-15 RX ADMIN — CLOPIDOGREL BISULFATE 75 MG: 75 TABLET ORAL at 09:19

## 2025-03-15 RX ADMIN — INSULIN LISPRO 6 UNITS: 100 INJECTION, SOLUTION INTRAVENOUS; SUBCUTANEOUS at 12:04

## 2025-03-15 RX ADMIN — BUMETANIDE 2 MG: 1 TABLET ORAL at 09:19

## 2025-03-15 RX ADMIN — SODIUM CHLORIDE 3 G: 9 INJECTION, SOLUTION INTRAVENOUS at 11:33

## 2025-03-15 RX ADMIN — HYDRALAZINE HYDROCHLORIDE 10 MG: 10 TABLET ORAL at 09:20

## 2025-03-15 RX ADMIN — ATORVASTATIN CALCIUM 20 MG: 20 TABLET, FILM COATED ORAL at 20:57

## 2025-03-15 RX ADMIN — HYDRALAZINE HYDROCHLORIDE 10 MG: 10 TABLET ORAL at 16:57

## 2025-03-15 RX ADMIN — INSULIN LISPRO 2 UNITS: 100 INJECTION, SOLUTION INTRAVENOUS; SUBCUTANEOUS at 16:56

## 2025-03-15 RX ADMIN — HYDRALAZINE HYDROCHLORIDE 10 MG: 10 TABLET ORAL at 20:57

## 2025-03-15 RX ADMIN — AMIODARONE HYDROCHLORIDE 200 MG: 200 TABLET ORAL at 09:20

## 2025-03-15 RX ADMIN — OXYCODONE HYDROCHLORIDE AND ACETAMINOPHEN 250 MG: 500 TABLET ORAL at 09:20

## 2025-03-15 RX ADMIN — Medication 3 ML: at 09:21

## 2025-03-15 RX ADMIN — INSULIN LISPRO 2 UNITS: 100 INJECTION, SOLUTION INTRAVENOUS; SUBCUTANEOUS at 12:04

## 2025-03-15 RX ADMIN — MENTHOL, ZINC OXIDE 1 APPLICATION: .44; 20.6 OINTMENT TOPICAL at 09:21

## 2025-03-15 RX ADMIN — Medication 3 ML: at 20:58

## 2025-03-15 RX ADMIN — INSULIN LISPRO 6 UNITS: 100 INJECTION, SOLUTION INTRAVENOUS; SUBCUTANEOUS at 09:19

## 2025-03-15 NOTE — PROGRESS NOTES
Name: Rock Maciel Jr. ADMIT: 3/3/2025   : 1945  PCP: Denise Dickson APRN    MRN: [id number] LOS: 12 days   AGE/SEX: 79 y.o. male  ROOM: [facility]     Subjective   Subjective   No acute events overnight.  No family present.  Reports did not sleep well overnight.  Denies fevers or chills.  Foot pain is stable.         Objective   Objective   Vital Signs  Temp:  [97.3 °F (36.3 °C)-98.4 °F (36.9 °C)] 98.4 °F (36.9 °C)  Heart Rate:  [65-78] 65  Resp:  [18] 18  BP: (136-178)/(42-61) 151/53  SpO2:  [86 %-98 %] 98 %  on  Flow (L/min) (Oxygen Therapy):  [2] 2;   Device (Oxygen Therapy): nasal cannula  Body mass index is 37.4 kg/m².    Physical exam  General: Laying in bed, not in distress,  HEENT: Normocephalic, atraumatic, Left posterior occipital scalp w dressing in place  CV: Regular rate and rhythm,.  Right upper chest HD catheter in place  Lungs: Clear to auscultation bilaterally, no crackles or wheezes  Abdomen: Soft, nontender, nondistended  Extremities: Bilateral lower extremity edema, status post left foot consultation.  Right foot dressing in place.      Results Review:       I reviewed the patient's new clinical results.  Results from last 7 days   Lab Units 03/15/25  0439 03/14/25  0518 03/13/25  0452 03/12/25  07   WBC 10*3/mm3 10.97* 9.21 8.65 9.14   HEMOGLOBIN g/dL 9.0* 7.3* 7.7* 6.8*   PLATELETS 10*3/mm3 293 268 292 274     Results from last 7 days   Lab Units 03/15/25  0439 03/14/25  0518 03/13/25  0452 03/12/25  0727   SODIUM mmol/L 134* 138 139 134*   POTASSIUM mmol/L 4.1 4.1 4.5 5.2   CHLORIDE mmol/L 100 103 103 99   CO2 mmol/L 22.4 21.0* 20.0* 20.7*   BUN mg/dL 33* 64* 84* 111*   CREATININE mg/dL 3.66* 5.76* 6.59* 7.39*   GLUCOSE mg/dL 145* 106* 130* 152*   Estimated Creatinine Clearance: 22.7 mL/min (A) (by C-G formula based on SCr of 3.66 mg/dL (H)).  Results from last 7 days   Lab Units 25  0518   ALBUMIN g/dL 2.4*     Results from last 7 days   Lab Units 03/15/25  0439  03/14/25  0518 03/13/25  0452 03/12/25  0727   CALCIUM mg/dL 8.0* 7.6* 7.8* 8.2*   ALBUMIN g/dL  --  2.4*  --   --    PHOSPHORUS mg/dL  --  6.5*  --   --        Glucose   Date/Time Value Ref Range Status   03/15/2025 1601 150 (H) 70 - 130 mg/dL Final   03/15/2025 1117 157 (H) 70 - 130 mg/dL Final   03/15/2025 0600 138 (H) 70 - 130 mg/dL Final   03/14/2025 2027 137 (H) 70 - 130 mg/dL Final   03/14/2025 1754 106 70 - 130 mg/dL Final   03/14/2025 1103 219 (H) 70 - 130 mg/dL Final   03/14/2025 0632 112 70 - 130 mg/dL Final       amiodarone, 200 mg, Oral, Daily  [START ON 3/16/2025] ampicillin-sulbactam, 3 g, Intravenous, Q24H  apixaban, 2.5 mg, Oral, BID  vitamin C, 250 mg, Oral, Daily  atorvastatin, 20 mg, Oral, Nightly  bumetanide, 2 mg, Oral, BID Diuretics  cholecalciferol, 1,000 Units, Oral, Daily  clopidogrel, 75 mg, Oral, Daily  Pharmacy to Dose daptomycin (CUBICIN), , Not Applicable, Daily  epoetin vince/vince-epbx, 10,000 Units, Intravenous, Once per day on Monday Wednesday Friday  hydrALAZINE, 10 mg, Oral, TID  insulin glargine, 5 Units, Subcutaneous, Nightly  insulin lispro, 2-9 Units, Subcutaneous, 4x Daily AC & at Bedtime  insulin lispro, 6 Units, Subcutaneous, TID With Meals  linagliptin, 5 mg, Oral, Daily  melatonin, 2.5 mg, Oral, Nightly  Menthol-Zinc Oxide, 1 Application, Topical, Q12H  metoprolol succinate XL, 25 mg, Oral, Daily  pantoprazole, 40 mg, Oral, Q AM  sodium chloride, 3 mL, Intravenous, Q12H  tamsulosin, 0.4 mg, Oral, Daily      Pharmacy Consult - Pharmacy to dose,     Diet: Regular/House, Cardiac, Diabetic, Renal; Healthy Heart (2-3 Na+); Consistent Carbohydrate; Low Potassium, Low Phosphorus; Fluid Consistency: Thin (IDDSI 0)       Assessment/Plan     Active Hospital Problems    Diagnosis  POA    **Traumatic rhabdomyolysis [T79.6XXA]  Yes    MRSA (methicillin resistant Staphylococcus aureus) infection [A49.02]  Yes    Contusion of left knee [S80.02XA]  Yes    Acute osteomyelitis of right foot  [M86.071]  Yes    Diabetic foot infection [E11.628, L08.9]  Yes    ATN (acute tubular necrosis) [N17.0]  Yes    Closed displaced fracture of left clavicle [S42.002A]  Yes    Pneumonia due to infectious organism [J18.9]  Yes    Acute kidney injury [N17.9]  Unknown    Persistent atrial fibrillation [I48.19]  Yes    Chronic anticoagulation [Z79.01]  Not Applicable    Stage 3b chronic kidney disease [N18.32]  Yes    Chronic diastolic (congestive) heart failure [I50.32]  Yes    KILLIAN (acute kidney injury) [N17.9]  Yes    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     79 year old man who presented following a mechanical fall leading to rhabdomyolysis, left knee contusion with left knee effusion and meniscus tear, and a clavicular fracture. He was also found to have KILLIAN on CKD 4, pneumonia, osteomyelitis and abscess/cellulitis of the right foot.     Traumatic rhabdomyolysis-resolved with IVF.     KILLIAN on CKD 4-prerenal due to ATN. Now s/p tunneled dialysis cathter placement and is now on HD    Post procedural right apical pneumothorax-tiny. Gone on repeat chest xray    Pneumonia-completed a course of zosyn while hospitalized    Osteomyelitis and abscess of the right foot-s/p I&D and partial 4th metatarsal excision on 3/10/25 by Dr. Mchugh and then additional debridement of nonviable necrotic tissue on 3/13/25. Currently on daptomycin and unasyn per ID with final antibiotic plans pending    Acute urinary retention/BPH-abdi catheter placed. Urology evaluated and    recommended flomax and a voiding trial on the morning of discharge.    Left clavicular  fracture-orthopedic surgery evaluated and recommended a sling as tolerated, ice, pain control, and to avoid forward flexion for now    Left knee contusion with left knee effusion and meniscus tear-orthopedic surgery recommends a knee brace/immobilizer for at least 3 weeks and then begin range of motion exercises with PT  Anemia of chronic disease/CKD  -hgb down to 7.3 03/14/2025.  Status post 1 unit of PRBC transfused.  Hemoglobin 9.0    Chronic afib-amiodarone. Eliquis is held for procedures, resumed 02/15    Chronic diastolic heart failure-bumex, though not really making much urine at this point. Volume is largely being managed with HD    Coronary artery disease-plavix is held acutely for procedures, will restart as this has been okayed with Dr. Mchugh. Statin/bb. Cardiology followed and signed off 3/13 with plans to follow up 4/8 in office.    Type 2 diabetes-A1c is 6. Glucose is at goal acutely on current regimen    Essential hypertension-adequate control acutely    Hyperlipidemia-statin  Eliquis (home med) for DVT prophylaxis.   Full code.  Discussed with patient,  Anticipate discharge to SNU facility next week. Working on placement with HD access.        ZEINA Fraga  [facility]  03/15/25  18:35 EDT

## 2025-03-15 NOTE — PROGRESS NOTES
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    Labs:  Lab Results   Component Value Date    GLUCOSE 145 (H) 03/15/2025    BUN 33 (H) 03/15/2025    CREATININE 3.66 (H) 03/15/2025     (L) 03/15/2025    K 4.1 03/15/2025     03/15/2025    CALCIUM 8.0 (L) 03/15/2025    PROTEINTOT 5.7 (L) 03/06/2025    ALBUMIN 2.4 (L) 03/14/2025    ALT 61 (H) 03/06/2025    AST 63 (H) 03/06/2025    ALKPHOS 220 (H) 03/06/2025    BILITOT 0.9 03/06/2025    GLOB 3.2 03/06/2025    AGRATIO 0.8 03/06/2025    BCR 9.0 03/15/2025    ANIONGAP 11.6 03/15/2025    EGFR 16.0 (L) 03/15/2025       CBC:      Lab 03/15/25  0439 03/14/25  0518 03/13/25  0452 03/12/25  0727 03/11/25  0518   WBC 10.97* 9.21 8.65 9.14 9.23   HEMOGLOBIN 9.0* 7.3* 7.7* 6.8* 7.0*   HEMATOCRIT 29.8* 22.7* 24.4* 21.4* 22.5*   PLATELETS 293 268 292 274 263   NEUTROS ABS  --   --   --  7.35*  --    IMMATURE GRANS (ABS)  --   --   --  0.38*  --    LYMPHS ABS  --   --   --  0.76  --    MONOS ABS  --   --   --  0.46  --    EOS ABS  --   --   --  0.16  --    MCV 94.6 89.4 90.7 92.6 92.6        Physical Exam:  NAD  No icterus  RRR  Right IJ TDC  Lungs clear  Soft abdomen positive bowel sounds  Right " foot wrapped prior left foot TMA  2 + edema        Medication Review:  amiodarone, 200 mg, Oral, Daily  [START ON 3/16/2025] ampicillin-sulbactam, 3 g, Intravenous, Q24H  apixaban, 2.5 mg, Oral, BID  vitamin C, 250 mg, Oral, Daily  atorvastatin, 20 mg, Oral, Nightly  bumetanide, 2 mg, Oral, BID Diuretics  cholecalciferol, 1,000 Units, Oral, Daily  clopidogrel, 75 mg, Oral, Daily  Pharmacy to Dose daptomycin (CUBICIN), , Not Applicable, Daily  epoetin vince/vince-epbx, 10,000 Units, Intravenous, Once per day on Monday Wednesday Friday  hydrALAZINE, 10 mg, Oral, TID  insulin glargine, 5 Units, Subcutaneous, Nightly  insulin lispro, 2-9 Units, Subcutaneous, 4x Daily AC & at Bedtime  insulin lispro, 6 Units, Subcutaneous, TID With Meals  linagliptin, 5 mg, Oral, Daily  melatonin, 2.5 mg, Oral, Nightly  Menthol-Zinc Oxide, 1 Application, Topical, Q12H  metoprolol succinate XL, 25 mg, Oral, Daily  pantoprazole, 40 mg, Oral, Q AM  sodium chloride, 3 mL, Intravenous, Q12H  tamsulosin, 0.4 mg, Oral, Daily        Assessment & Plan   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 S/P treatment yesterday with plans for tomorrow.  Right foot osteo on antibx S/P surgery  Urinary retention  HTN with BP in goal  DM with stable sugars  Hyperlipidemia on statin  Afib with controlled rate on amiodarone  Continue with prn dialysis and fluid removal as possible.  Antibiotics per ID.    Traumatic rhabdomyolysis    Arteriosclerosis of coronary artery    Essential hypertension    Type 2 diabetes mellitus with circulatory disorder, without long-term current use of insulin    Hyperlipidemia    Charcot-Davida-Tooth disease-like deformity of foot    Fall    S/P CABG x 2    KILLIAN (acute kidney injury)    Chronic diastolic (congestive) heart failure    Stage 3b chronic kidney disease    Chronic anticoagulation    Persistent atrial fibrillation    Closed displaced fracture of left clavicle    Pneumonia due to infectious organism    ATN (acute tubular  necrosis)    Contusion of left knee    Acute osteomyelitis of right foot    Diabetic foot infection    MRSA (methicillin resistant Staphylococcus aureus) infection    Acute kidney injury      Quinn Yepez MD  03/15/25  18:13 EDT

## 2025-03-15 NOTE — PLAN OF CARE
Goal Outcome Evaluation:  Plan of Care Reviewed With: patient           Outcome Evaluation: Patient alert and oriented, remains on 2L oxygen via nasal cannula. Denies pain and nausea. Verbalizes disliking food at hospital. Received IV antibiotics. Plan of care ongoing.

## 2025-03-15 NOTE — PROGRESS NOTES
Infectious Diseases Progress Note    Nicolasa Denny MD     [facility]  [place]: 12 days  Patient Identification:  Name: Rock Maciel Jr.  Age: 79 y.o.  Sex: male  :  1945  MRN: [id number]         Primary Care Physician: Denise Dickson, ZEINA        Subjective: Could not sleep well last night because he could not shut down his mind and was restless.  Denies any physical symptoms such as fever chills or worsening pain in his foot.  Tolerated dialysis yesterday.  Interval History: See consultation note.    Objective:    Scheduled Meds:amiodarone, 200 mg, Oral, Daily  ampicillin-sulbactam, 3 g, Intravenous, Q24H  apixaban, 2.5 mg, Oral, BID  vitamin C, 250 mg, Oral, Daily  atorvastatin, 20 mg, Oral, Nightly  bumetanide, 2 mg, Oral, BID Diuretics  cholecalciferol, 1,000 Units, Oral, Daily  clopidogrel, 75 mg, Oral, Daily  Pharmacy to Dose daptomycin (CUBICIN), , Not Applicable, Daily  epoetin vince/vince-epbx, 10,000 Units, Intravenous, Once per day on   hydrALAZINE, 10 mg, Oral, TID  insulin glargine, 5 Units, Subcutaneous, Nightly  insulin lispro, 2-9 Units, Subcutaneous, 4x Daily AC & at Bedtime  insulin lispro, 6 Units, Subcutaneous, TID With Meals  linagliptin, 5 mg, Oral, Daily  melatonin, 2.5 mg, Oral, Nightly  Menthol-Zinc Oxide, 1 Application, Topical, Q12H  metoprolol succinate XL, 25 mg, Oral, Daily  pantoprazole, 40 mg, Oral, Q AM  sodium chloride, 3 mL, Intravenous, Q12H  tamsulosin, 0.4 mg, Oral, Daily      Continuous Infusions:Pharmacy Consult - Pharmacy to dose,         Vital signs in last 24 hours:  Temp:  [97.3 °F (36.3 °C)-98.4 °F (36.9 °C)] 98.2 °F (36.8 °C)  Heart Rate:  [58-78] 68  Resp:  [18] 18  BP: (136-181)/(42-61) 169/52    Intake/Output:    Intake/Output Summary (Last 24 hours) at 3/15/2025 0958  Last data filed at 3/15/2025 0430  Gross per 24 hour   Intake 662.92 ml   Output 2070 ml   Net -1407.08 ml       Exam:  /52 (BP Location: Left arm,  "Patient Position: Lying)   Pulse 68   Temp 98.2 °F (36.8 °C) (Oral)   Resp 18   Ht 185.4 cm (73\")   Wt 129 kg (283 lb 8.2 oz)   SpO2 97%   BMI 37.40 kg/m²   Patient is examined using the personal protective equipment as per guidelines from infection control for this particular patient as enacted.  Hand washing was performed before and after patient interaction.  General Appearance:  Lethargic but interactive                          Head:  Left posterior occipital scalp area laceration dressed.                           Eyes:    PERRL, pale conjunctiva                         Throat:   Lips, tongue, gums normal; oral mucosa pink and moist                           Neck:   Supple, symmetrical, trachea midline, no JVD                         Lungs:    Clear to auscultation bilaterally, respirations unlabored                 Chest Wall:    No tenderness or deformity left clavicular tenderness noted, right IJ tunneled catheter in place.                          Heart:  S1-S2 irregular                  Abdomen:   Soft nontender                 Extremities: Right foot dressed after incision and drainage of multiple compartments and partial resection of the fourth metatarsal.                  Neurologic: Alert and oriented x 3       Data Review:    I reviewed the patient's new clinical results.  Results from last 7 days   Lab Units 03/15/25  0439 03/14/25 0518 03/13/25 0452 03/12/25  0727 03/11/25 0518 03/09/25  0714   WBC 10*3/mm3 10.97* 9.21 8.65 9.14 9.23 9.95   HEMOGLOBIN g/dL 9.0* 7.3* 7.7* 6.8* 7.0* 7.6*   PLATELETS 10*3/mm3 293 268 292 274 263 261     Results from last 7 days   Lab Units 03/15/25  0439 03/14/25  0518 03/13/25 0452 03/12/25  0727 03/11/25 0518 03/10/25  0529 03/09/25  0714   SODIUM mmol/L 134* 138 139 134* 136 134* 138   POTASSIUM mmol/L 4.1 4.1 4.5 5.2 4.9 4.5 4.3   CHLORIDE mmol/L 100 103 103 99 100 100 101   CO2 mmol/L 22.4 21.0* 20.0* 20.7* 21.0* 19.8* 23.0   BUN mg/dL 33* 64* 84* " 111* 107* 91* 89*   CREATININE mg/dL 3.66* 5.76* 6.59* 7.39* 6.67* 5.85* 5.05*   CALCIUM mg/dL 8.0* 7.6* 7.8* 8.2* 8.0* 7.8* 8.1*   GLUCOSE mg/dL 145* 106* 130* 152* 146* 131* 131*     Microbiology Results (last 10 days)       Procedure Component Value - Date/Time    Anaerobic Culture - Wound, Foot, Right [[id number]]  (Normal) Collected: 03/10/25 0909    Lab Status: Final result Specimen: Wound from Foot, Right Updated: 03/15/25 0658     Anaerobic Culture No anaerobes isolated at 5 days    Wound Culture - Wound, Foot, Right [[id number]]  (Abnormal)  (Susceptibility) Collected: 03/10/25 0909    Lab Status: Final result Specimen: Wound from Foot, Right Updated: 03/13/25 0643     Wound Culture Scant growth (1+) Staphylococcus aureus, MRSA     Comment:   Methicillin resistant Staphylococcus aureus, Patient may be an isolation risk.        Gram Stain Rare (1+) Gram positive cocci      Rare (1+) WBCs seen    Narrative:            Susceptibility        Staphylococcus aureus, MRSA      ABBIE      Clindamycin Susceptible      Erythromycin Resistant      Oxacillin Resistant      Rifampin Susceptible      Tetracycline Susceptible      Trimethoprim + Sulfamethoxazole Resistant      Vancomycin Susceptible                           Wound Culture - Wound, Foot, Right [[id number]]  (Abnormal)  (Susceptibility) Collected: 03/06/25 1620    Lab Status: Edited Result - FINAL Specimen: Wound from Foot, Right Updated: 03/11/25 0837     Wound Culture Moderate growth (3+) Staphylococcus aureus, MRSA     Comment:   Methicillin resistant Staphylococcus aureus, Patient may be an isolation risk.         Scant growth (1+) Candida albicans      Light growth (2+) Normal Skin Ashley     Gram Stain Moderate (3+) WBCs per low power field      Few (2+) Gram positive cocci in pairs and clusters      Few (2+) Gram positive bacilli    Susceptibility        Staphylococcus aureus, MRSA      ABBIE      Clindamycin Susceptible      Daptomycin Susceptible (C)  [1]        Erythromycin Resistant      Oxacillin Resistant      Rifampin Susceptible      Tetracycline Susceptible      Trimethoprim + Sulfamethoxazole Resistant      Vancomycin Susceptible                   [1]  Appended report. These results have been appended to a previously final verified report.                Susceptibility Comments       Staphylococcus aureus, MRSA    Daptomycin released per Eduardo in Pharmacy               Legionella Antigen, Urine - Urine, Urine, Clean Catch [[id number]]  (Normal) Collected: 03/06/25 1143    Lab Status: Final result Specimen: Urine, Clean Catch Updated: 03/06/25 1252     LEGIONELLA ANTIGEN, URINE Negative    S. Pneumo Ag Urine or CSF - Urine, Urine, Clean Catch [[id number]]  (Normal) Collected: 03/06/25 1143    Lab Status: Final result Specimen: Urine, Clean Catch Updated: 03/06/25 1252     Strep Pneumo Ag Negative              Assessment:    Traumatic rhabdomyolysis    Arteriosclerosis of coronary artery    Essential hypertension    Type 2 diabetes mellitus with circulatory disorder, without long-term current use of insulin    Hyperlipidemia    Charcot-Davida-Tooth disease-like deformity of foot    Fall    S/P CABG x 2    KILLIAN (acute kidney injury)    Chronic diastolic (congestive) heart failure    Stage 3b chronic kidney disease    Chronic anticoagulation    Persistent atrial fibrillation    Closed displaced fracture of left clavicle    Pneumonia due to infectious organism    ATN (acute tubular necrosis)    Contusion of left knee    Acute osteomyelitis of right foot    Diabetic foot infection    MRSA (methicillin resistant Staphylococcus aureus) infection    Acute kidney injury  1-probable systemic illness due to  2-evolving ongoing infection with abscess of the right foot resulting in generalized weakness and  3-subsequent fall and fracture of left clavicle and left knee discomfort  4-diabetes mellitus  5-history of gout  6-sleep apnea  7-obesity  8-hypertension  9-severe  anemia  10-acute on chronic renal failure - ESRD to start dialysis -3/12/2025  11-prior multiple diabetic foot infections and surgeries.  12-status post right IJ tunneled catheter with small post op pneumothorax     Recommendations/Discussions:  See my discussion and recommendations on 3/7/2025.  Continue Unasyn and if renal function is not considered as improvable and ongoing dialysis as his permanent care plan for end-stage renal disease then is not unreasonable to consider switching him from daptomycin to vancomycin.  Discussed with pharmacy.  Follow-up on operative culture results and depending upon operative findings and final surgery and subsequent concern for residual osteomyelitis final antibiotic regimen in terms of composition and duration would be recommended.  Nicolasa Denny MD  3/15/2025  09:58 EDT    Parts of this note may be an electronic transcription/translation of spoken language to printed text using the Dragon dictation system.

## 2025-03-15 NOTE — PROGRESS NOTES
"[facility] Clinical Pharmacy Services: Daptomycin Monitoring Note    Rock Maciel Jr. is a 79 y.o. male who is on day 3 of pharmacy to dose vancomycin for Bone and/or Joint Infection.    Previous Daptomycin Dose:   intermittent dosing  Updated Cultures and Sensitivities:   3/10 wound Cx MRSA  3/6 wound cx MRSA  3/3 BCx NGTD       Vitals/Labs  Ht: 185.4 cm (73\"); Wt: 129 kg (283 lb 8.2 oz)   Temp Readings from Last 1 Encounters:   03/14/25 97.5 °F (36.4 °C) (Oral)     Estimated Creatinine Clearance: 22.7 mL/min (A) (by C-G formula based on SCr of 3.66 mg/dL (H)).   HD planned likely for MWF per the nephrologist note 3/14    Results from last 7 days   Lab Units 03/15/25  0439 03/14/25  0518 03/13/25  0452   CREATININE mg/dL 3.66* 5.76* 6.59*   WBC 10*3/mm3 10.97* 9.21 8.65     Assessment/Plan    Current Daptomycin Dose: Intermittent - plan is to give 8mg/kg after each HD session once schedule is confirmed. No dose is planned today, please call pharmacy at [phone] if HD plan changes     We will continue to monitor patient changes and renal function. Will also monitor CK while on daptomycin therapy.     Thank you for involving pharmacy in this patient's care. Please contact pharmacy with any questions or concerns.       Emilie Dow, PharmD  Clinical Pharmacist          "

## 2025-03-16 LAB
ANION GAP SERPL CALCULATED.3IONS-SCNC: 13 MMOL/L (ref 5–15)
BUN SERPL-MCNC: 41 MG/DL (ref 8–23)
BUN/CREAT SERPL: 7.6 (ref 7–25)
CALCIUM SPEC-SCNC: 7.8 MG/DL (ref 8.6–10.5)
CHLORIDE SERPL-SCNC: 101 MMOL/L (ref 98–107)
CK SERPL-CCNC: 23 U/L (ref 20–200)
CO2 SERPL-SCNC: 21 MMOL/L (ref 22–29)
CREAT SERPL-MCNC: 5.42 MG/DL (ref 0.76–1.27)
DEPRECATED RDW RBC AUTO: 53 FL (ref 37–54)
EGFRCR SERPLBLD CKD-EPI 2021: 10 ML/MIN/1.73
ERYTHROCYTE [DISTWIDTH] IN BLOOD BY AUTOMATED COUNT: 15.7 % (ref 12.3–15.4)
GLUCOSE BLDC GLUCOMTR-MCNC: 120 MG/DL (ref 70–130)
GLUCOSE BLDC GLUCOMTR-MCNC: 124 MG/DL (ref 70–130)
GLUCOSE BLDC GLUCOMTR-MCNC: 131 MG/DL (ref 70–130)
GLUCOSE BLDC GLUCOMTR-MCNC: 149 MG/DL (ref 70–130)
GLUCOSE SERPL-MCNC: 118 MG/DL (ref 65–99)
HCT VFR BLD AUTO: 28.4 % (ref 37.5–51)
HGB BLD-MCNC: 9.1 G/DL (ref 13–17.7)
MAGNESIUM SERPL-MCNC: 2.3 MG/DL (ref 1.6–2.4)
MCH RBC QN AUTO: 29.3 PG (ref 26.6–33)
MCHC RBC AUTO-ENTMCNC: 32 G/DL (ref 31.5–35.7)
MCV RBC AUTO: 91.3 FL (ref 79–97)
PHOSPHATE SERPL-MCNC: 5.7 MG/DL (ref 2.5–4.5)
PLATELET # BLD AUTO: 304 10*3/MM3 (ref 140–450)
PMV BLD AUTO: 9.6 FL (ref 6–12)
POTASSIUM SERPL-SCNC: 4.3 MMOL/L (ref 3.5–5.2)
RBC # BLD AUTO: 3.11 10*6/MM3 (ref 4.14–5.8)
SODIUM SERPL-SCNC: 135 MMOL/L (ref 136–145)
WBC NRBC COR # BLD AUTO: 13.87 10*3/MM3 (ref 3.4–10.8)

## 2025-03-16 PROCEDURE — 25010000002 AMPICILLIN-SULBACTAM PER 1.5 G: Performed by: INTERNAL MEDICINE

## 2025-03-16 PROCEDURE — 82948 REAGENT STRIP/BLOOD GLUCOSE: CPT

## 2025-03-16 PROCEDURE — 83735 ASSAY OF MAGNESIUM: CPT | Performed by: STUDENT IN AN ORGANIZED HEALTH CARE EDUCATION/TRAINING PROGRAM

## 2025-03-16 PROCEDURE — 82550 ASSAY OF CK (CPK): CPT | Performed by: INTERNAL MEDICINE

## 2025-03-16 PROCEDURE — 85027 COMPLETE CBC AUTOMATED: CPT | Performed by: STUDENT IN AN ORGANIZED HEALTH CARE EDUCATION/TRAINING PROGRAM

## 2025-03-16 PROCEDURE — 80048 BASIC METABOLIC PNL TOTAL CA: CPT | Performed by: STUDENT IN AN ORGANIZED HEALTH CARE EDUCATION/TRAINING PROGRAM

## 2025-03-16 PROCEDURE — 84100 ASSAY OF PHOSPHORUS: CPT | Performed by: STUDENT IN AN ORGANIZED HEALTH CARE EDUCATION/TRAINING PROGRAM

## 2025-03-16 PROCEDURE — 63710000001 INSULIN LISPRO (HUMAN) PER 5 UNITS: Performed by: PODIATRIST

## 2025-03-16 RX ADMIN — BUMETANIDE 2 MG: 1 TABLET ORAL at 09:17

## 2025-03-16 RX ADMIN — Medication 3 ML: at 22:42

## 2025-03-16 RX ADMIN — AMPICILLIN SODIUM AND SULBACTAM SODIUM 3 G: 2; 1 INJECTION, POWDER, FOR SOLUTION INTRAMUSCULAR; INTRAVENOUS at 10:55

## 2025-03-16 RX ADMIN — HYDRALAZINE HYDROCHLORIDE 10 MG: 10 TABLET ORAL at 09:17

## 2025-03-16 RX ADMIN — Medication 5 MG: at 22:41

## 2025-03-16 RX ADMIN — BUMETANIDE 2 MG: 1 TABLET ORAL at 17:03

## 2025-03-16 RX ADMIN — MENTHOL, ZINC OXIDE 1 APPLICATION: .44; 20.6 OINTMENT TOPICAL at 22:42

## 2025-03-16 RX ADMIN — Medication 3 ML: at 09:18

## 2025-03-16 RX ADMIN — AMIODARONE HYDROCHLORIDE 200 MG: 200 TABLET ORAL at 09:18

## 2025-03-16 RX ADMIN — CLOPIDOGREL BISULFATE 75 MG: 75 TABLET ORAL at 09:17

## 2025-03-16 RX ADMIN — METOPROLOL SUCCINATE 25 MG: 25 TABLET, EXTENDED RELEASE ORAL at 09:18

## 2025-03-16 RX ADMIN — INSULIN LISPRO 6 UNITS: 100 INJECTION, SOLUTION INTRAVENOUS; SUBCUTANEOUS at 12:10

## 2025-03-16 RX ADMIN — INSULIN LISPRO 6 UNITS: 100 INJECTION, SOLUTION INTRAVENOUS; SUBCUTANEOUS at 17:03

## 2025-03-16 RX ADMIN — Medication 1000 UNITS: at 09:18

## 2025-03-16 RX ADMIN — MENTHOL, ZINC OXIDE 1 APPLICATION: .44; 20.6 OINTMENT TOPICAL at 09:18

## 2025-03-16 RX ADMIN — LINAGLIPTIN 5 MG: 5 TABLET, FILM COATED ORAL at 09:17

## 2025-03-16 RX ADMIN — HYDRALAZINE HYDROCHLORIDE 10 MG: 10 TABLET ORAL at 17:03

## 2025-03-16 RX ADMIN — APIXABAN 2.5 MG: 2.5 TABLET, FILM COATED ORAL at 22:41

## 2025-03-16 RX ADMIN — HYDROCODONE BITARTRATE AND ACETAMINOPHEN 1 TABLET: 5; 325 TABLET ORAL at 00:12

## 2025-03-16 RX ADMIN — PANTOPRAZOLE SODIUM 40 MG: 40 TABLET, DELAYED RELEASE ORAL at 06:15

## 2025-03-16 RX ADMIN — TAMSULOSIN HYDROCHLORIDE 0.4 MG: 0.4 CAPSULE ORAL at 09:17

## 2025-03-16 RX ADMIN — ATORVASTATIN CALCIUM 20 MG: 20 TABLET, FILM COATED ORAL at 22:41

## 2025-03-16 RX ADMIN — APIXABAN 2.5 MG: 2.5 TABLET, FILM COATED ORAL at 09:18

## 2025-03-16 RX ADMIN — OXYCODONE HYDROCHLORIDE AND ACETAMINOPHEN 250 MG: 500 TABLET ORAL at 09:17

## 2025-03-16 RX ADMIN — HYDRALAZINE HYDROCHLORIDE 10 MG: 10 TABLET ORAL at 22:41

## 2025-03-16 RX ADMIN — INSULIN LISPRO 6 UNITS: 100 INJECTION, SOLUTION INTRAVENOUS; SUBCUTANEOUS at 09:17

## 2025-03-16 NOTE — PROGRESS NOTES
"[facility] Clinical Pharmacy Services: Daptomycin Monitoring Note    Rock Maciel Jr. is a 79 y.o. male who is on day 5 of pharmacy to dose vancomycin for Bone and/or Joint Infection.    Previous Daptomycin Dose:   intermittent dosing  Updated Cultures and Sensitivities:   3/10 wound Cx MRSA  3/6 wound cx MRSA  3/3 BCx NGTD       Vitals/Labs  Ht: 185.4 cm (73\"); Wt: 129 kg (283 lb 8.2 oz)   Temp Readings from Last 1 Encounters:   03/16/25 97.5 °F (36.4 °C) (Oral)     Estimated Creatinine Clearance: 15.3 mL/min (A) (by C-G formula based on SCr of 5.42 mg/dL (H)).   HD planned likely for MWF per the nephrologist note 3/14    Results from last 7 days   Lab Units 03/16/25  0431 03/15/25  0439 03/14/25  0518   CREATININE mg/dL 5.42* 3.66* 5.76*   WBC 10*3/mm3 13.87* 10.97* 9.21     Assessment/Plan    Current Daptomycin Dose: Intermittent - plan is to give 8mg/kg after each HD session once schedule is confirmed. HD plans conflicting 3/16- spoke with RN and confirmed there are no active HD orders today.  We will continue to monitor patient changes and renal function. GLENN LAU today      Thank you for involving pharmacy in this patient's care. Please contact pharmacy with any questions or concerns.       Emilie Dow, PharmD  Clinical Pharmacist              "

## 2025-03-16 NOTE — PROGRESS NOTES
Name: Rock Maciel Jr. ADMIT: 3/3/2025   : 1945  PCP: Denise Dickson APRN    MRN: [id number] LOS: 13 days   AGE/SEX: 79 y.o. male  ROOM: [facility]     Subjective   Subjective   .  Patient reports did not sleep well overnight, otherwise no new complaints.  Having BM.    ROS  As above     Objective   Objective   Vital Signs  Temp:  [97.5 °F (36.4 °C)-98.1 °F (36.7 °C)] 97.9 °F (36.6 °C)  Heart Rate:  [66-77] 66  Resp:  [18] 18  BP: (157-183)/(55-64) 166/57  SpO2:  [94 %-98 %] 98 %  on  Flow (L/min) (Oxygen Therapy):  [2] 2;   Device (Oxygen Therapy): nasal cannula  Body mass index is 37.4 kg/m².    Physical exam  General: Alert, laying in bed, not in distress, chronically ill-appearing  HEENT: Normocephalic, atraumatic, Left posterior occipital scalp w dressing in place  CV: Regular rate and rhythm,.  Right upper chest HD catheter in place  Lungs: CTA anteriorly, no wheezing,  Abdomen: Soft, nontender, nondistended  Extremities: Bilateral lower extremity edema, status post left foot amputation..  Right foot dressing in place.      Results Review:       I reviewed the patient's new clinical results.  Results from last 7 days   Lab Units 03/16/25  0431 03/15/25  0439 03/14/25  0518 03/13/25  045   WBC 10*3/mm3 13.87* 10.97* 9.21 8.65   HEMOGLOBIN g/dL 9.1* 9.0* 7.3* 7.7*   PLATELETS 10*3/mm3 304 293 268 292     Results from last 7 days   Lab Units 25  0431 03/15/25  0439 03/14/25  0518 03/13/25  045   SODIUM mmol/L 135* 134* 138 139   POTASSIUM mmol/L 4.3 4.1 4.1 4.5   CHLORIDE mmol/L 101 100 103 103   CO2 mmol/L 21.0* 22.4 21.0* 20.0*   BUN mg/dL 41* 33* 64* 84*   CREATININE mg/dL 5.42* 3.66* 5.76* 6.59*   GLUCOSE mg/dL 118* 145* 106* 130*   Estimated Creatinine Clearance: 15.3 mL/min (A) (by C-G formula based on SCr of 5.42 mg/dL (H)).  Results from last 7 days   Lab Units 25  0518   ALBUMIN g/dL 2.4*     Results from last 7 days   Lab Units 25  0431 03/15/25  0439 25  0518  03/13/25  0452   CALCIUM mg/dL 7.8* 8.0* 7.6* 7.8*   ALBUMIN g/dL  --   --  2.4*  --    MAGNESIUM mg/dL 2.3  --   --   --    PHOSPHORUS mg/dL 5.7*  --  6.5*  --        Glucose   Date/Time Value Ref Range Status   03/16/2025 1608 131 (H) 70 - 130 mg/dL Final   03/16/2025 1128 149 (H) 70 - 130 mg/dL Final   03/16/2025 0619 124 70 - 130 mg/dL Final   03/15/2025 2055 140 (H) 70 - 130 mg/dL Final   03/15/2025 1601 150 (H) 70 - 130 mg/dL Final   03/15/2025 1117 157 (H) 70 - 130 mg/dL Final   03/15/2025 0600 138 (H) 70 - 130 mg/dL Final       amiodarone, 200 mg, Oral, Daily  ampicillin-sulbactam, 3 g, Intravenous, Q24H  apixaban, 2.5 mg, Oral, BID  vitamin C, 250 mg, Oral, Daily  atorvastatin, 20 mg, Oral, Nightly  bumetanide, 2 mg, Oral, BID Diuretics  cholecalciferol, 1,000 Units, Oral, Daily  clopidogrel, 75 mg, Oral, Daily  Pharmacy to Dose daptomycin (CUBICIN), , Not Applicable, Daily  epoetin vince/vince-epbx, 10,000 Units, Intravenous, Once per day on Monday Wednesday Friday  hydrALAZINE, 10 mg, Oral, TID  insulin glargine, 5 Units, Subcutaneous, Nightly  insulin lispro, 2-9 Units, Subcutaneous, 4x Daily AC & at Bedtime  insulin lispro, 6 Units, Subcutaneous, TID With Meals  linagliptin, 5 mg, Oral, Daily  melatonin, 5 mg, Oral, Nightly  Menthol-Zinc Oxide, 1 Application, Topical, Q12H  metoprolol succinate XL, 25 mg, Oral, Daily  pantoprazole, 40 mg, Oral, Q AM  sodium chloride, 3 mL, Intravenous, Q12H  tamsulosin, 0.4 mg, Oral, Daily      Pharmacy Consult - Pharmacy to dose,     Diet: Regular/House, Cardiac, Diabetic, Renal; Healthy Heart (2-3 Na+); Consistent Carbohydrate; Low Potassium, Low Phosphorus; Fluid Consistency: Thin (IDDSI 0)       Assessment/Plan     Active Hospital Problems    Diagnosis  POA    **Traumatic rhabdomyolysis [T79.6XXA]  Yes    MRSA (methicillin resistant Staphylococcus aureus) infection [A49.02]  Yes    Contusion of left knee [S80.02XA]  Yes    Acute osteomyelitis of right foot [M86.071]   Yes    Diabetic foot infection [E11.628, L08.9]  Yes    ATN (acute tubular necrosis) [N17.0]  Yes    Closed displaced fracture of left clavicle [S42.002A]  Yes    Pneumonia due to infectious organism [J18.9]  Yes    Acute kidney injury [N17.9]  Unknown    Persistent atrial fibrillation [I48.19]  Yes    Chronic anticoagulation [Z79.01]  Not Applicable    Stage 3b chronic kidney disease [N18.32]  Yes    Chronic diastolic (congestive) heart failure [I50.32]  Yes    KILLIAN (acute kidney injury) [N17.9]  Yes    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     79 year old man who presented following a mechanical fall leading to rhabdomyolysis, left knee contusion with left knee effusion and meniscus tear, and a clavicular fracture. He was also found to have KILLIAN on CKD 4, pneumonia, osteomyelitis and abscess/cellulitis of the right foot.     Traumatic rhabdomyolysis-resolved with IVF.     KILLIAN on CKD 4-prerenal due to ATN. Now s/p tunneled dialysis cathter placement and is now on HD    Post procedural right apical pneumothorax-tiny. Gone on repeat chest xray    Pneumonia-completed a course of zosyn while hospitalized    Osteomyelitis and abscess of the right foot-s/p I&D and partial 4th metatarsal excision on 3/10/25 by Dr. Mchugh and then additional debridement of nonviable necrotic tissue on 3/13/25. Currently on daptomycin and unasyn per ID with final antibiotic plans pending    Acute urinary retention/BPH-abdi catheter placed. Urology evaluated and  recommended flomax and a voiding trial on the morning of discharge.    Left clavicular  fracture-orthopedic surgery evaluated and recommended a sling as tolerated, ice, pain control, and to avoid forward flexion for now    Left knee contusion with left knee effusion and meniscus tear-orthopedic surgery recommends a knee brace/immobilizer for at least 3 weeks and then begin range of motion exercises with PT  Anemia of chronic disease/CKD  -hgb down to 7.3 03/14/2025.  Status post 1 unit of PRBC transfused.  Hemoglobin 9.0    Chronic afib-amiodarone. Eliquis is held for procedures, resumed 02/15    Chronic diastolic heart failure-bumex, though not really making much urine at this point. Volume is largely being managed with HD    Coronary artery disease-plavix is held acutely for procedures, will restart as this has been okayed with Dr. Mchugh. Statin/bb. Cardiology followed and signed off 3/13 with plans to follow up 4/8 in office.    Type 2 diabetes-A1c is 6. Glucose is at goal acutely on current regimen    Essential hypertension-adequate control acutely    Hyperlipidemia-statin     Insomnia  -Increase melatonin to 5 mg nightly      Eliquis (home med) for DVT prophylaxis.   Full code.  Discussed with patient,  Anticipate discharge to SNU facility next week. Working on placement with HD access.        ZEINA Fraga  [facility]  03/16/25  16:20 EDT

## 2025-03-16 NOTE — PLAN OF CARE
Goal Outcome Evaluation:           Progress: no change  Outcome Evaluation: Alert, oriented and pleasent. Incont of large soft stool this PM. Johnson patent and draining straw colored urine. Dressings to BLE CDI, no c/o pain all shift. Had difficulties sleeping, asked for pain pill at HS to see if it would help him sleep. It did not. VSS, 2L O2. Contact precautions manintaned. Nothing signficant over night.

## 2025-03-16 NOTE — PROGRESS NOTES
"      FOLLOW UP NOTE      Name: Rock Maciel Jr. ADMIT: 3/3/2025   : 1945  PCP: Denise Dickson APRN    MRN: [id number] LOS: 13 days   AGE/SEX: 79 y.o. male  ROOM: [facility]     Date of Service: 3/16/2025                           CHIEF COMPLIANTS / REASON FOR FOLLOW UP          Renal follow up of KILLIAN on CKD 4      Subjective:        79 year old male with KILLIAN on CKD 4 now on dialysis.  S/P dialysis last on Friday 3/14.  Tolerated treatment  Feeling better             Review of Systems:       No chest pain  No SOB  Still with mild weakness  ROS was otherwise negative except as mentioned in the [place].        OBJECTIVE                                                                        Exam:  /57 (BP Location: Left arm, Patient Position: Lying)   Pulse 66   Temp 97.9 °F (36.6 °C) (Oral)   Resp 18   Ht 185.4 cm (73\")   Wt 129 kg (283 lb 8.2 oz)   SpO2 98%   BMI 37.40 kg/m²   Intake/Output last 3 shifts:  I/O last 3 completed shifts:  In: -   Out: 50 [Urine:50]  Intake/Output this shift:  No intake/output data recorded.    Awake and alert  On 2 liters nasal cannula  No JVD  Right TDC  IRRR in 60's  Lungs fairly clear  Soft abdomen positive bowel sounds  1 + edema   Feet wrapped      Scheduled Meds:amiodarone, 200 mg, Oral, Daily  ampicillin-sulbactam, 3 g, Intravenous, Q24H  apixaban, 2.5 mg, Oral, BID  vitamin C, 250 mg, Oral, Daily  atorvastatin, 20 mg, Oral, Nightly  bumetanide, 2 mg, Oral, BID Diuretics  cholecalciferol, 1,000 Units, Oral, Daily  clopidogrel, 75 mg, Oral, Daily  Pharmacy to Dose daptomycin (CUBICIN), , Not Applicable, Daily  epoetin vince/vince-epbx, 10,000 Units, Intravenous, Once per day on   hydrALAZINE, 10 mg, Oral, TID  insulin glargine, 5 Units, Subcutaneous, Nightly  insulin lispro, 2-9 Units, Subcutaneous, 4x Daily AC & at Bedtime  insulin lispro, 6 Units, Subcutaneous, TID With Meals  linagliptin, 5 mg, Oral, Daily  melatonin, 5 mg, Oral, " Nightly  Menthol-Zinc Oxide, 1 Application, Topical, Q12H  metoprolol succinate XL, 25 mg, Oral, Daily  pantoprazole, 40 mg, Oral, Q AM  sodium chloride, 3 mL, Intravenous, Q12H  tamsulosin, 0.4 mg, Oral, Daily      Continuous Infusions:Pharmacy Consult - Pharmacy to dose,       PRN Meds:  artificial tears    senna-docusate sodium **AND** polyethylene glycol **AND** bisacodyl **AND** bisacodyl    cadexomer iodine    dextrose    dextrose    famotidine    glucagon (human recombinant)    heparin (porcine)    HYDROcodone-acetaminophen    nitroglycerin    ondansetron ODT **OR** ondansetron    Pharmacy Consult - Pharmacy to dose    sodium chloride    sodium chloride         Data Review:                                                                           Lab Results   Component Value Date    GLUCOSE 118 (H) 03/16/2025    BUN 41 (H) 03/16/2025    CREATININE 5.42 (H) 03/16/2025     (L) 03/16/2025    K 4.3 03/16/2025     03/16/2025    CALCIUM 7.8 (L) 03/16/2025    PROTEINTOT 5.7 (L) 03/06/2025    ALBUMIN 2.4 (L) 03/14/2025    ALT 61 (H) 03/06/2025    AST 63 (H) 03/06/2025    ALKPHOS 220 (H) 03/06/2025    BILITOT 0.9 03/06/2025    GLOB 3.2 03/06/2025    AGRATIO 0.8 03/06/2025    BCR 7.6 03/16/2025    ANIONGAP 13.0 03/16/2025    EGFR 10.0 (L) 03/16/2025     CBC:      Lab 03/16/25  0431 03/15/25  0439 03/14/25  0518 03/13/25  0452 03/12/25  0727   WBC 13.87* 10.97* 9.21 8.65 9.14   HEMOGLOBIN 9.1* 9.0* 7.3* 7.7* 6.8*   HEMATOCRIT 28.4* 29.8* 22.7* 24.4* 21.4*   PLATELETS 304 293 268 292 274   NEUTROS ABS  --   --   --   --  7.35*   IMMATURE GRANS (ABS)  --   --   --   --  0.38*   LYMPHS ABS  --   --   --   --  0.76   MONOS ABS  --   --   --   --  0.46   EOS ABS  --   --   --   --  0.16   MCV 91.3 94.6 89.4 90.7 92.6                ASSESSMENT:                                                                                Traumatic rhabdomyolysis    Arteriosclerosis of coronary artery    Essential hypertension     Type 2 diabetes mellitus with circulatory disorder, without long-term current use of insulin    Hyperlipidemia    Charcot-Davida-Tooth disease-like deformity of foot    Fall    S/P CABG x 2    KILLIAN (acute kidney injury)    Chronic diastolic (congestive) heart failure    Stage 3b chronic kidney disease    Chronic anticoagulation    Persistent atrial fibrillation    Closed displaced fracture of left clavicle    Pneumonia due to infectious organism    ATN (acute tubular necrosis)    Contusion of left knee    Acute osteomyelitis of right foot    Diabetic foot infection    MRSA (methicillin resistant Staphylococcus aureus) infection    Acute kidney injury    KILLIAN on CKD 4. Currently dialysis dependent. Last dialysis 2 days ago. Creatinine 5.4 today and 3.7 yesterday. No signs of recovery. Will plan dialysis again tomorrow.  HTN with BP in goal range with mild systolic elevation at times but low diastolic readings.  Anemia stable with Hgb 9.1.  DM with stable sugars.  Urinary retention with abdi in place. On flomax  Chronic Afib on Amiodarone and eliquis. Eliquis dose appropriate at 2.5 mg.  Treated hyperlipidemia on statin  Right foot osteo on antibiotics. S/P debridement         PLAN:                                                                              Dialysis tomorrow  Antibiotics per ID  Monitor labs and volume           Quinn Yepez MD  [facility]  3/16/2025  19:21 EDT

## 2025-03-16 NOTE — PROGRESS NOTES
Infectious Diseases Progress Note    Nicolasa Denny MD     [facility]  Los: 13 days  Patient Identification:  Name: Rock Maciel Jr.  Age: 79 y.o.  Sex: male  :  1945  MRN: [id number]         Primary Care Physician: Denise Dickson, ZEINA        Subjective: Did not sleep very well last night because of care issues.  Denies any complaints.  Overall feeling stronger and better.  Interval History: See consultation note.    Objective:    Scheduled Meds:amiodarone, 200 mg, Oral, Daily  ampicillin-sulbactam, 3 g, Intravenous, Q24H  apixaban, 2.5 mg, Oral, BID  vitamin C, 250 mg, Oral, Daily  atorvastatin, 20 mg, Oral, Nightly  bumetanide, 2 mg, Oral, BID Diuretics  cholecalciferol, 1,000 Units, Oral, Daily  clopidogrel, 75 mg, Oral, Daily  Pharmacy to Dose daptomycin (CUBICIN), , Not Applicable, Daily  epoetin vince/vince-epbx, 10,000 Units, Intravenous, Once per day on   hydrALAZINE, 10 mg, Oral, TID  insulin glargine, 5 Units, Subcutaneous, Nightly  insulin lispro, 2-9 Units, Subcutaneous, 4x Daily AC & at Bedtime  insulin lispro, 6 Units, Subcutaneous, TID With Meals  linagliptin, 5 mg, Oral, Daily  melatonin, 5 mg, Oral, Nightly  Menthol-Zinc Oxide, 1 Application, Topical, Q12H  metoprolol succinate XL, 25 mg, Oral, Daily  pantoprazole, 40 mg, Oral, Q AM  sodium chloride, 3 mL, Intravenous, Q12H  tamsulosin, 0.4 mg, Oral, Daily      Continuous Infusions:Pharmacy Consult - Pharmacy to dose,         Vital signs in last 24 hours:  Temp:  [97.5 °F (36.4 °C)-98.4 °F (36.9 °C)] 97.5 °F (36.4 °C)  Heart Rate:  [65-77] 67  Resp:  [18] 18  BP: (151-183)/(53-64) 157/64    Intake/Output:    Intake/Output Summary (Last 24 hours) at 3/16/2025 0843  Last data filed at 3/16/2025 0600  Gross per 24 hour   Intake --   Output 50 ml   Net -50 ml       Exam:  /64 (BP Location: Left arm, Patient Position: Lying)   Pulse 67   Temp 97.5 °F (36.4 °C) (Oral)   Resp 18   Ht 185.4 cm  "(73\")   Wt 129 kg (283 lb 8.2 oz)   SpO2 98%   BMI 37.40 kg/m²   Patient is examined using the personal protective equipment as per guidelines from infection control for this particular patient as enacted.  Hand washing was performed before and after patient interaction.  General Appearance:  Lethargic but interactive                          Head:  Left posterior occipital scalp area laceration dressed.                           Eyes:    PERRL, pale conjunctiva                         Throat:   Lips, tongue, gums normal; oral mucosa pink and moist                           Neck:   Supple, symmetrical, trachea midline, no JVD                         Lungs:    Clear to auscultation bilaterally, respirations unlabored                 Chest Wall:    No tenderness or deformity left clavicular tenderness noted, right IJ tunneled catheter in place.                          Heart:  S1-S2 irregular                  Abdomen:   Soft nontender                 Extremities: Right foot dressed after incision and drainage of multiple compartments and partial resection of the fourth metatarsal.                  Neurologic: Alert and oriented x 3       Data Review:    I reviewed the patient's new clinical results.  Results from last 7 days   Lab Units 03/16/25  0431 03/15/25  0439 03/14/25  0518 03/13/25 0452 03/12/25  0727 03/11/25  0518   WBC 10*3/mm3 13.87* 10.97* 9.21 8.65 9.14 9.23   HEMOGLOBIN g/dL 9.1* 9.0* 7.3* 7.7* 6.8* 7.0*   PLATELETS 10*3/mm3 304 293 268 292 274 263     Results from last 7 days   Lab Units 03/16/25  0431 03/15/25  0439 03/14/25  0518 03/13/25  0452 03/12/25  0727 03/11/25  0518 03/10/25  0529   SODIUM mmol/L 135* 134* 138 139 134* 136 134*   POTASSIUM mmol/L 4.3 4.1 4.1 4.5 5.2 4.9 4.5   CHLORIDE mmol/L 101 100 103 103 99 100 100   CO2 mmol/L 21.0* 22.4 21.0* 20.0* 20.7* 21.0* 19.8*   BUN mg/dL 41* 33* 64* 84* 111* 107* 91*   CREATININE mg/dL 5.42* 3.66* 5.76* 6.59* 7.39* 6.67* 5.85*   CALCIUM mg/dL " 7.8* 8.0* 7.6* 7.8* 8.2* 8.0* 7.8*   GLUCOSE mg/dL 118* 145* 106* 130* 152* 146* 131*     Microbiology Results (last 10 days)       Procedure Component Value - Date/Time    Anaerobic Culture - Wound, Foot, Right [[id number]]  (Normal) Collected: 03/10/25 0909    Lab Status: Final result Specimen: Wound from Foot, Right Updated: 03/15/25 0658     Anaerobic Culture No anaerobes isolated at 5 days    Wound Culture - Wound, Foot, Right [[id number]]  (Abnormal)  (Susceptibility) Collected: 03/10/25 0909    Lab Status: Final result Specimen: Wound from Foot, Right Updated: 03/13/25 0643     Wound Culture Scant growth (1+) Staphylococcus aureus, MRSA     Comment:   Methicillin resistant Staphylococcus aureus, Patient may be an isolation risk.        Gram Stain Rare (1+) Gram positive cocci      Rare (1+) WBCs seen    Narrative:            Susceptibility        Staphylococcus aureus, MRSA      ABBIE      Clindamycin Susceptible      Erythromycin Resistant      Oxacillin Resistant      Rifampin Susceptible      Tetracycline Susceptible      Trimethoprim + Sulfamethoxazole Resistant      Vancomycin Susceptible                           Wound Culture - Wound, Foot, Right [[id number]]  (Abnormal)  (Susceptibility) Collected: 03/06/25 1620    Lab Status: Edited Result - FINAL Specimen: Wound from Foot, Right Updated: 03/11/25 0837     Wound Culture Moderate growth (3+) Staphylococcus aureus, MRSA     Comment:   Methicillin resistant Staphylococcus aureus, Patient may be an isolation risk.         Scant growth (1+) Candida albicans      Light growth (2+) Normal Skin Ashley     Gram Stain Moderate (3+) WBCs per low power field      Few (2+) Gram positive cocci in pairs and clusters      Few (2+) Gram positive bacilli    Susceptibility        Staphylococcus aureus, MRSA      ABBIE      Clindamycin Susceptible      Daptomycin Susceptible (C)  [1]       Erythromycin Resistant      Oxacillin Resistant      Rifampin Susceptible       Tetracycline Susceptible      Trimethoprim + Sulfamethoxazole Resistant      Vancomycin Susceptible                   [1]  Appended report. These results have been appended to a previously final verified report.                Susceptibility Comments       Staphylococcus aureus, MRSA    Daptomycin released per Eduardo in Pharmacy               Legionella Antigen, Urine - Urine, Urine, Clean Catch [[id number]]  (Normal) Collected: 03/06/25 1143    Lab Status: Final result Specimen: Urine, Clean Catch Updated: 03/06/25 1252     LEGIONELLA ANTIGEN, URINE Negative    S. Pneumo Ag Urine or CSF - Urine, Urine, Clean Catch [[id number]]  (Normal) Collected: 03/06/25 1143    Lab Status: Final result Specimen: Urine, Clean Catch Updated: 03/06/25 1252     Strep Pneumo Ag Negative              Assessment:    Traumatic rhabdomyolysis    Arteriosclerosis of coronary artery    Essential hypertension    Type 2 diabetes mellitus with circulatory disorder, without long-term current use of insulin    Hyperlipidemia    Charcot-Davida-Tooth disease-like deformity of foot    Fall    S/P CABG x 2    KILLIAN (acute kidney injury)    Chronic diastolic (congestive) heart failure    Stage 3b chronic kidney disease    Chronic anticoagulation    Persistent atrial fibrillation    Closed displaced fracture of left clavicle    Pneumonia due to infectious organism    ATN (acute tubular necrosis)    Contusion of left knee    Acute osteomyelitis of right foot    Diabetic foot infection    MRSA (methicillin resistant Staphylococcus aureus) infection    Acute kidney injury  1-probable systemic illness due to  2-evolving ongoing infection with abscess of the right foot resulting in generalized weakness and  3-subsequent fall and fracture of left clavicle and left knee discomfort  4-diabetes mellitus  5-history of gout  6-sleep apnea  7-obesity  8-hypertension  9-severe anemia  10-acute on chronic renal failure - ESRD to start dialysis -3/12/2025  11-prior  multiple diabetic foot infections and surgeries.  12-status post right IJ tunneled catheter with small post op pneumothorax     Recommendations/Discussions:  See my discussion and recommendation on 3/7/2025 and 3/15/2025.  Pathology report from the operative sample taken on 3/10/2025 reveals that resected bone margin is free of acute infection and inflammation.  In this scenario patient would need 2 weeks of antibiotic therapy from 3/10/2025.  Nicolasa Denny MD  3/16/2025  08:43 EDT    Parts of this note may be an electronic transcription/translation of spoken language to printed text using the Dragon dictation system.

## 2025-03-16 NOTE — PLAN OF CARE
Goal Outcome Evaluation:  Plan of Care Reviewed With: patient           Outcome Evaluation: Alert and oriented, remains on 2L oxygen via NC. Denies pain. Dressing changed to right lower extremety per podiatry order, tolerated well. Plan of care is ongoing.

## 2025-03-17 LAB
ANION GAP SERPL CALCULATED.3IONS-SCNC: 15.9 MMOL/L (ref 5–15)
BUN SERPL-MCNC: 48 MG/DL (ref 8–23)
BUN/CREAT SERPL: 7.6 (ref 7–25)
CALCIUM SPEC-SCNC: 8.2 MG/DL (ref 8.6–10.5)
CHLORIDE SERPL-SCNC: 100 MMOL/L (ref 98–107)
CO2 SERPL-SCNC: 20.1 MMOL/L (ref 22–29)
CREAT SERPL-MCNC: 6.32 MG/DL (ref 0.76–1.27)
DEPRECATED RDW RBC AUTO: 50 FL (ref 37–54)
EGFRCR SERPLBLD CKD-EPI 2021: 8.3 ML/MIN/1.73
ERYTHROCYTE [DISTWIDTH] IN BLOOD BY AUTOMATED COUNT: 15.4 % (ref 12.3–15.4)
GLUCOSE BLDC GLUCOMTR-MCNC: 108 MG/DL (ref 70–130)
GLUCOSE BLDC GLUCOMTR-MCNC: 120 MG/DL (ref 70–130)
GLUCOSE BLDC GLUCOMTR-MCNC: 138 MG/DL (ref 70–130)
GLUCOSE BLDC GLUCOMTR-MCNC: 153 MG/DL (ref 70–130)
GLUCOSE SERPL-MCNC: 104 MG/DL (ref 65–99)
HCT VFR BLD AUTO: 27.8 % (ref 37.5–51)
HGB BLD-MCNC: 8.9 G/DL (ref 13–17.7)
MAGNESIUM SERPL-MCNC: 2.4 MG/DL (ref 1.6–2.4)
MCH RBC QN AUTO: 28.8 PG (ref 26.6–33)
MCHC RBC AUTO-ENTMCNC: 32 G/DL (ref 31.5–35.7)
MCV RBC AUTO: 90 FL (ref 79–97)
PHOSPHATE SERPL-MCNC: 7.3 MG/DL (ref 2.5–4.5)
PLATELET # BLD AUTO: 326 10*3/MM3 (ref 140–450)
PMV BLD AUTO: 9.5 FL (ref 6–12)
POTASSIUM SERPL-SCNC: 4.6 MMOL/L (ref 3.5–5.2)
RBC # BLD AUTO: 3.09 10*6/MM3 (ref 4.14–5.8)
SODIUM SERPL-SCNC: 136 MMOL/L (ref 136–145)
WBC NRBC COR # BLD AUTO: 12.9 10*3/MM3 (ref 3.4–10.8)

## 2025-03-17 PROCEDURE — 83735 ASSAY OF MAGNESIUM: CPT | Performed by: STUDENT IN AN ORGANIZED HEALTH CARE EDUCATION/TRAINING PROGRAM

## 2025-03-17 PROCEDURE — 25010000002 DAPTOMYCIN PER 1 MG: Performed by: INTERNAL MEDICINE

## 2025-03-17 PROCEDURE — 80048 BASIC METABOLIC PNL TOTAL CA: CPT | Performed by: STUDENT IN AN ORGANIZED HEALTH CARE EDUCATION/TRAINING PROGRAM

## 2025-03-17 PROCEDURE — 84100 ASSAY OF PHOSPHORUS: CPT | Performed by: STUDENT IN AN ORGANIZED HEALTH CARE EDUCATION/TRAINING PROGRAM

## 2025-03-17 PROCEDURE — 25010000002 AMPICILLIN-SULBACTAM PER 1.5 G: Performed by: STUDENT IN AN ORGANIZED HEALTH CARE EDUCATION/TRAINING PROGRAM

## 2025-03-17 PROCEDURE — 82948 REAGENT STRIP/BLOOD GLUCOSE: CPT

## 2025-03-17 PROCEDURE — 97110 THERAPEUTIC EXERCISES: CPT

## 2025-03-17 PROCEDURE — 85027 COMPLETE CBC AUTOMATED: CPT | Performed by: STUDENT IN AN ORGANIZED HEALTH CARE EDUCATION/TRAINING PROGRAM

## 2025-03-17 PROCEDURE — 97530 THERAPEUTIC ACTIVITIES: CPT

## 2025-03-17 PROCEDURE — 25010000002 EPOETIN ALFA-EPBX 10000 UNIT/ML SOLUTION: Performed by: PODIATRIST

## 2025-03-17 PROCEDURE — 25010000002 HYDRALAZINE PER 20 MG: Performed by: STUDENT IN AN ORGANIZED HEALTH CARE EDUCATION/TRAINING PROGRAM

## 2025-03-17 PROCEDURE — 63710000001 INSULIN LISPRO (HUMAN) PER 5 UNITS: Performed by: PODIATRIST

## 2025-03-17 PROCEDURE — 63710000001 INSULIN GLARGINE PER 5 UNITS: Performed by: PODIATRIST

## 2025-03-17 RX ORDER — SEVELAMER CARBONATE 800 MG/1
1600 TABLET, FILM COATED ORAL
Status: DISCONTINUED | OUTPATIENT
Start: 2025-03-17 | End: 2025-03-19

## 2025-03-17 RX ORDER — BUMETANIDE 1 MG/1
2 TABLET ORAL 3 TIMES DAILY
Status: DISCONTINUED | OUTPATIENT
Start: 2025-03-17 | End: 2025-03-19 | Stop reason: HOSPADM

## 2025-03-17 RX ORDER — HYDRALAZINE HYDROCHLORIDE 20 MG/ML
10 INJECTION INTRAMUSCULAR; INTRAVENOUS ONCE
Status: COMPLETED | OUTPATIENT
Start: 2025-03-17 | End: 2025-03-17

## 2025-03-17 RX ORDER — ZOLPIDEM TARTRATE 5 MG/1
2.5 TABLET ORAL NIGHTLY
Status: DISCONTINUED | OUTPATIENT
Start: 2025-03-17 | End: 2025-03-19 | Stop reason: HOSPADM

## 2025-03-17 RX ADMIN — ATORVASTATIN CALCIUM 20 MG: 20 TABLET, FILM COATED ORAL at 21:15

## 2025-03-17 RX ADMIN — BUMETANIDE 2 MG: 1 TABLET ORAL at 21:15

## 2025-03-17 RX ADMIN — HYDRALAZINE HYDROCHLORIDE 10 MG: 10 TABLET ORAL at 17:00

## 2025-03-17 RX ADMIN — OXYCODONE HYDROCHLORIDE AND ACETAMINOPHEN 250 MG: 500 TABLET ORAL at 13:12

## 2025-03-17 RX ADMIN — Medication 3 ML: at 21:16

## 2025-03-17 RX ADMIN — Medication 1 APPLICATION: at 13:13

## 2025-03-17 RX ADMIN — INSULIN LISPRO 2 UNITS: 100 INJECTION, SOLUTION INTRAVENOUS; SUBCUTANEOUS at 17:01

## 2025-03-17 RX ADMIN — EPOETIN ALFA-EPBX 10000 UNITS: 10000 INJECTION, SOLUTION INTRAVENOUS; SUBCUTANEOUS at 11:20

## 2025-03-17 RX ADMIN — CLOPIDOGREL BISULFATE 75 MG: 75 TABLET ORAL at 13:13

## 2025-03-17 RX ADMIN — APIXABAN 2.5 MG: 2.5 TABLET, FILM COATED ORAL at 21:15

## 2025-03-17 RX ADMIN — TAMSULOSIN HYDROCHLORIDE 0.4 MG: 0.4 CAPSULE ORAL at 13:13

## 2025-03-17 RX ADMIN — METOPROLOL SUCCINATE 25 MG: 25 TABLET, EXTENDED RELEASE ORAL at 13:13

## 2025-03-17 RX ADMIN — HYDRALAZINE HYDROCHLORIDE 10 MG: 20 INJECTION INTRAMUSCULAR; INTRAVENOUS at 11:20

## 2025-03-17 RX ADMIN — AMPICILLIN SODIUM AND SULBACTAM SODIUM 3 G: 2; 1 INJECTION, POWDER, FOR SOLUTION INTRAMUSCULAR; INTRAVENOUS at 13:14

## 2025-03-17 RX ADMIN — SEVELAMER CARBONATE 1600 MG: 800 TABLET, FILM COATED ORAL at 13:13

## 2025-03-17 RX ADMIN — BUMETANIDE 2 MG: 1 TABLET ORAL at 17:00

## 2025-03-17 RX ADMIN — LINAGLIPTIN 5 MG: 5 TABLET, FILM COATED ORAL at 13:12

## 2025-03-17 RX ADMIN — INSULIN LISPRO 6 UNITS: 100 INJECTION, SOLUTION INTRAVENOUS; SUBCUTANEOUS at 17:01

## 2025-03-17 RX ADMIN — MENTHOL, ZINC OXIDE 1 APPLICATION: .44; 20.6 OINTMENT TOPICAL at 21:16

## 2025-03-17 RX ADMIN — MENTHOL, ZINC OXIDE 1 APPLICATION: .44; 20.6 OINTMENT TOPICAL at 13:16

## 2025-03-17 RX ADMIN — SEVELAMER CARBONATE 1600 MG: 800 TABLET, FILM COATED ORAL at 17:00

## 2025-03-17 RX ADMIN — AMIODARONE HYDROCHLORIDE 200 MG: 200 TABLET ORAL at 13:13

## 2025-03-17 RX ADMIN — DAPTOMYCIN 800 MG: 500 INJECTION, POWDER, LYOPHILIZED, FOR SOLUTION INTRAVENOUS at 17:00

## 2025-03-17 RX ADMIN — APIXABAN 2.5 MG: 2.5 TABLET, FILM COATED ORAL at 13:13

## 2025-03-17 RX ADMIN — ZOLPIDEM TARTRATE 2.5 MG: 5 TABLET, FILM COATED ORAL at 21:16

## 2025-03-17 RX ADMIN — Medication 1000 UNITS: at 13:13

## 2025-03-17 RX ADMIN — INSULIN LISPRO 6 UNITS: 100 INJECTION, SOLUTION INTRAVENOUS; SUBCUTANEOUS at 13:12

## 2025-03-17 RX ADMIN — INSULIN GLARGINE 5 UNITS: 100 INJECTION, SOLUTION SUBCUTANEOUS at 21:16

## 2025-03-17 NOTE — PLAN OF CARE
Goal Outcome Evaluation:  Plan of Care Reviewed With: patient        Progress: improving  Outcome Evaluation: Pt sat agreeable to PT this PM for tx. Pt performed B LE ther ex in bed x 10 reps each.  Pt sat up to EOB w/ min / mod A x 2 and incr time. Pt sat at EOB over 12 min where he performed some B LE ther ex. Pt declined sit to stand attempt stating he was very tired after dialysis. Pt returned to bed w/ mod A  x 1 to assist B LE. Rec SNF after dc to address deficits.    Anticipated Discharge Disposition (PT): skilled nursing facility

## 2025-03-17 NOTE — PROGRESS NOTES
"[facility] Clinical Pharmacy Services: Daptomycin Monitoring Note    Rock Maciel Jr. is a 79 y.o. male who is on day 6 of pharmacy to dose vancomycin for Bone and/or Joint Infection.    Previous Daptomycin Dose:   intermittent dosing  Updated Cultures and Sensitivities:   3/10 wound Cx MRSA  3/6 wound cx MRSA  3/3 BCx NGTD       Vitals/Labs  Ht: 185.4 cm (73\"); Wt: 129 kg (283 lb 8.2 oz)   Temp Readings from Last 1 Encounters:   03/17/25 98 °F (36.7 °C)     Estimated Creatinine Clearance: 13.1 mL/min (A) (by C-G formula based on SCr of 6.32 mg/dL (H)).   HD planned likely for MWF per the nephrologist note 3/14    Results from last 7 days   Lab Units 03/17/25  0552 03/16/25  0431 03/15/25  0439   CREATININE mg/dL 6.32* 5.42* 3.66*   WBC 10*3/mm3 12.90* 13.87* 10.97*     Assessment/Plan    Current Daptomycin Dose: 800 mg once. (Intermittent - plan is to give 8mg/kg after each HD, pt back to room after HD )          Thank you for involving pharmacy in this patient's care. Please contact pharmacy with any questions or concerns.       Bernice Mei [facility]  Clinical Pharmacist                "

## 2025-03-17 NOTE — PLAN OF CARE
Goal Outcome Evaluation:  Plan of Care Reviewed With: patient        Progress: improving     Pt is alert and oriented.  Dialysis completed today and ordered for tomorrow.  VSS.  IV antibiotics today after dialysis.  Wound vac placed by wound nurse today.  CCP working on d/c plans.  Will continue to monitor patient.

## 2025-03-17 NOTE — CASE MANAGEMENT/SOCIAL WORK
Continued Stay Note  [facility]     Patient Name: Rock Maciel Jr.  MRN: [id number]  Today's Date: 3/17/2025    Admit Date: 3/3/2025    Plan: SNF with on-site HD referrals pending.   Discharge Plan       Row Name 03/17/25 1530       Plan    Plan SNF with on-site HD referrals pending.    Patient/Family in Agreement with Plan yes    Plan Comments [facility] notified CCP need to see updated PT/OT notes. CCP s/w [facility] who stated she is confirming bed availability. CCP met with pt at the bedside to discuss DC. CCP explained both [facility] and [facility] are still but need to see PT/OT notes and [facility] is confirming bed availability. Pt stated his first choice is [facility]. Leonardo AYON/CCP                   Discharge Codes    No documentation.                 Expected Discharge Date and Time       Expected Discharge Date Expected Discharge Time    Mar 18, 2025               Pauly Martinez RN

## 2025-03-17 NOTE — PROGRESS NOTES
Name: Rock Maciel Jr. ADMIT: 3/3/2025   : 1945  PCP: Denise Dickson APRN    MRN: [id number] LOS: 14 days   AGE/SEX: 79 y.o. male  ROOM: [facility]     Subjective   Subjective   Laying in bed, no acute events overnight.  Continues to have difficulty sleeping despite increased dose of melatonin.      ROS  As above     Objective   Objective   Vital Signs  Temp:  [97.3 °F (36.3 °C)-98.6 °F (37 °C)] 98 °F (36.7 °C)  Heart Rate:  [73-82] 77  Resp:  [18-24] 18  BP: (170-212)/(61-86) 175/66  SpO2:  [95 %-98 %] 95 %  on  Flow (L/min) (Oxygen Therapy):  [2] 2;   Device (Oxygen Therapy): nasal cannula  Body mass index is 37.4 kg/m².    Physical exam  General: Alert, laying in bed, not in distress, chronically ill-appearing  HEENT: Normocephalic, atraumatic,   CV: Regular rate and rhythm,.  Right upper chest HD catheter in place  Lungs: CTA anteriorly, no wheezing,  Abdomen: Soft, nontender, nondistended  Extremities: Bilateral lower extremity edema, status post left foot amputation.  Right foot dressing in place.      Results Review:       I reviewed the patient's new clinical results.  Results from last 7 days   Lab Units 25  0552 25  0431 03/15/25  0439 03/14/25  0518   WBC 10*3/mm3 12.90* 13.87* 10.97* 9.21   HEMOGLOBIN g/dL 8.9* 9.1* 9.0* 7.3*   PLATELETS 10*3/mm3 326 304 293 268     Results from last 7 days   Lab Units 25  0552 25  0431 03/15/25  04325  0518   SODIUM mmol/L 136 135* 134* 138   POTASSIUM mmol/L 4.6 4.3 4.1 4.1   CHLORIDE mmol/L 100 101 100 103   CO2 mmol/L 20.1* 21.0* 22.4 21.0*   BUN mg/dL 48* 41* 33* 64*   CREATININE mg/dL 6.32* 5.42* 3.66* 5.76*   GLUCOSE mg/dL 104* 118* 145* 106*   Estimated Creatinine Clearance: 13.1 mL/min (A) (by C-G formula based on SCr of 6.32 mg/dL (H)).  Results from last 7 days   Lab Units 25  0518   ALBUMIN g/dL 2.4*     Results from last 7 days   Lab Units 25  0552 25  0431 03/15/25  0439 25  0518    CALCIUM mg/dL 8.2* 7.8* 8.0* 7.6*   ALBUMIN g/dL  --   --   --  2.4*   MAGNESIUM mg/dL 2.4 2.3  --   --    PHOSPHORUS mg/dL 7.3* 5.7*  --  6.5*       Glucose   Date/Time Value Ref Range Status   03/17/2025 1623 153 (H) 70 - 130 mg/dL Final   03/17/2025 1249 120 70 - 130 mg/dL Final   03/17/2025 0619 108 70 - 130 mg/dL Final   03/16/2025 2121 120 70 - 130 mg/dL Final   03/16/2025 1608 131 (H) 70 - 130 mg/dL Final   03/16/2025 1128 149 (H) 70 - 130 mg/dL Final   03/16/2025 0619 124 70 - 130 mg/dL Final       amiodarone, 200 mg, Oral, Daily  ampicillin-sulbactam, 3 g, Intravenous, Q24H  apixaban, 2.5 mg, Oral, BID  vitamin C, 250 mg, Oral, Daily  atorvastatin, 20 mg, Oral, Nightly  bumetanide, 2 mg, Oral, TID  cholecalciferol, 1,000 Units, Oral, Daily  clopidogrel, 75 mg, Oral, Daily  DAPTOmycin, 8 mg/kg (Adjusted), Intravenous, Once  Pharmacy to Dose daptomycin (CUBICIN), , Not Applicable, Daily  epoetin vince/vince-epbx, 10,000 Units, Intravenous, Once per day on Monday Wednesday Friday  hydrALAZINE, 10 mg, Oral, TID  insulin glargine, 5 Units, Subcutaneous, Nightly  insulin lispro, 2-9 Units, Subcutaneous, 4x Daily AC & at Bedtime  insulin lispro, 6 Units, Subcutaneous, TID With Meals  linagliptin, 5 mg, Oral, Daily  melatonin, 5 mg, Oral, Nightly  Menthol-Zinc Oxide, 1 Application, Topical, Q12H  metoprolol succinate XL, 25 mg, Oral, Daily  pantoprazole, 40 mg, Oral, Q AM  sevelamer, 1,600 mg, Oral, TID With Meals  sodium chloride, 3 mL, Intravenous, Q12H  tamsulosin, 0.4 mg, Oral, Daily      Pharmacy Consult - Pharmacy to dose,     Diet: Regular/House, Cardiac, Diabetic, Renal; Healthy Heart (2-3 Na+); Consistent Carbohydrate; Low Potassium, Low Phosphorus; Fluid Consistency: Thin (IDDSI 0)       Assessment/Plan     Active Hospital Problems    Diagnosis  POA   • **Traumatic rhabdomyolysis [T79.6XXA]  Yes   • MRSA (methicillin resistant Staphylococcus aureus) infection [A49.02]  Yes   • Contusion of left knee  [S80.02XA]  Yes   • Acute osteomyelitis of right foot [M86.071]  Yes   • Diabetic foot infection [E11.628, L08.9]  Yes   • ATN (acute tubular necrosis) [N17.0]  Yes   • Closed displaced fracture of left clavicle [S42.002A]  Yes   • Pneumonia due to infectious organism [J18.9]  Yes   • Acute kidney injury [N17.9]  Unknown   • Persistent atrial fibrillation [I48.19]  Yes   • Chronic anticoagulation [Z79.01]  Not Applicable   • Stage 3b chronic kidney disease [N18.32]  Yes   • Chronic diastolic (congestive) heart failure [I50.32]  Yes   • KILLIAN (acute kidney injury) [N17.9]  Yes   • 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     79 year old man who presented following a mechanical fall leading to rhabdomyolysis, left knee contusion with left knee effusion and meniscus tear, and a clavicular fracture. He was also found to have KILLIAN on CKD 4, pneumonia, osteomyelitis and abscess/cellulitis of the right foot.     Traumatic rhabdomyolysis-resolved with IVF.     KILLIAN on CKD 4-prerenal due to ATN. Now s/p tunneled dialysis cathter placement and is now on HD    Post procedural right apical pneumothorax-tiny. Gone on repeat chest xray    Pneumonia-completed a course of zosyn while hospitalized    Osteomyelitis and abscess of the right foot-s/p I&D and partial 4th metatarsal excision on 3/10/25 by Dr. Mchugh and then additional debridement of nonviable necrotic tissue on 3/13/25. Currently on daptomycin and unasyn per ID with final antibiotic plans pending    Acute urinary retention/BPH-abdi catheter placed. Urology evaluated and  recommended  flomax and a voiding trial on the morning of discharge.    Left clavicular fracture-orthopedic surgery evaluated and recommended a sling as tolerated, ice, pain control, and to avoid forward flexion for now    Left knee contusion with left knee effusion and meniscus tear-orthopedic surgery recommends a knee brace/immobilizer for at least 3 weeks and then begin range of motion exercises with PT  Anemia of chronic disease/CKD  -hgb down to 7.3 03/14/2025.  Status post 1 unit of PRBC transfused.  Hemoglobin 9.0    Chronic afib-amiodarone. Eliquis is held for procedures, resumed 02/15    Chronic diastolic heart failure-bumex, though not really making much urine at this point. Volume is largely being managed with HD    Coronary artery disease-plavix is held acutely for procedures, will restart as this has been okayed with Dr. Mchugh. Statin/bb. Cardiology followed and signed off 3/13 with plans to follow up 4/8 in office.    Type 2 diabetes-A1c is 6. Glucose is at goal acutely on current regimen    Essential hypertension-adequate control acutely    Hyperlipidemia-statin     Insomnia  -Continue 5 mg nightly  -Will prescribe low-dose Ambien    Eliquis (home med) for DVT prophylaxis.   Full code.  Discussed with patient,  Anticipate discharge to SNU  once arranged, Working on placement with HD access.        ZEINA Fraga  [facility]  03/17/25  16:54 EDT

## 2025-03-17 NOTE — PROGRESS NOTES
"  Infectious Diseases Progress Note    Nicolasa Denny MD     [facility]  Los: 14 days  Patient Identification:  Name: Rock Maciel Jr.  Age: 79 y.o.  Sex: male  :  1945  MRN: [id number]         Primary Care Physician: Denise Dickson, ZEINA        Subjective: Seen in dialysis and claims to be feeling well.  Denies any new complaints.  Interval History: See consultation note.    Objective:    Scheduled Meds:amiodarone, 200 mg, Oral, Daily  ampicillin-sulbactam, 3 g, Intravenous, Q24H  apixaban, 2.5 mg, Oral, BID  vitamin C, 250 mg, Oral, Daily  atorvastatin, 20 mg, Oral, Nightly  bumetanide, 2 mg, Oral, BID Diuretics  cholecalciferol, 1,000 Units, Oral, Daily  clopidogrel, 75 mg, Oral, Daily  Pharmacy to Dose daptomycin (CUBICIN), , Not Applicable, Daily  epoetin vince/vince-epbx, 10,000 Units, Intravenous, Once per day on   hydrALAZINE, 10 mg, Oral, TID  insulin glargine, 5 Units, Subcutaneous, Nightly  insulin lispro, 2-9 Units, Subcutaneous, 4x Daily AC & at Bedtime  insulin lispro, 6 Units, Subcutaneous, TID With Meals  linagliptin, 5 mg, Oral, Daily  melatonin, 5 mg, Oral, Nightly  Menthol-Zinc Oxide, 1 Application, Topical, Q12H  metoprolol succinate XL, 25 mg, Oral, Daily  pantoprazole, 40 mg, Oral, Q AM  sodium chloride, 3 mL, Intravenous, Q12H  tamsulosin, 0.4 mg, Oral, Daily      Continuous Infusions:Pharmacy Consult - Pharmacy to dose,         Vital signs in last 24 hours:  Temp:  [97.3 °F (36.3 °C)-98.6 °F (37 °C)] 98.6 °F (37 °C)  Heart Rate:  [66-80] 80  Resp:  [18] 18  BP: (157-172)/(57-86) 172/86    Intake/Output:  No intake or output data in the 24 hours ending 25 0630      Exam:  /86 (BP Location: Right arm, Patient Position: Lying)   Pulse 80   Temp 98.6 °F (37 °C) (Oral)   Resp 18   Ht 185.4 cm (73\")   Wt 129 kg (283 lb 8.2 oz)   SpO2 96%   BMI 37.40 kg/m²   Patient is examined using the personal protective equipment as per " guidelines from infection control for this particular patient as enacted.  Hand washing was performed before and after patient interaction.  General Appearance:  Lethargic but interactive                          Head:  Left posterior occipital scalp area laceration dressed.                           Eyes:    PERRL, pale conjunctiva                         Throat:   Lips, tongue, gums normal; oral mucosa pink and moist                           Neck:   Supple, symmetrical, trachea midline, no JVD                         Lungs:    Clear to auscultation bilaterally, respirations unlabored                 Chest Wall:    No tenderness or deformity left clavicular tenderness noted, right IJ tunneled catheter in place.                          Heart:  S1-S2 irregular                  Abdomen:   Soft nontender                 Extremities: Right foot dressed after incision and drainage of multiple compartments and partial resection of the fourth metatarsal.                  Neurologic: Alert and oriented x 3       Data Review:    I reviewed the patient's new clinical results.  Results from last 7 days   Lab Units 03/17/25  0552 03/16/25  0431 03/15/25  0439 03/14/25  0518 03/13/25  0452 03/12/25  0727 03/11/25  0518   WBC 10*3/mm3 12.90* 13.87* 10.97* 9.21 8.65 9.14 9.23   HEMOGLOBIN g/dL 8.9* 9.1* 9.0* 7.3* 7.7* 6.8* 7.0*   PLATELETS 10*3/mm3 326 304 293 268 292 274 263     Results from last 7 days   Lab Units 03/16/25  0431 03/15/25  0439 03/14/25  0518 03/13/25  0452 03/12/25  0727 03/11/25  0518   SODIUM mmol/L 135* 134* 138 139 134* 136   POTASSIUM mmol/L 4.3 4.1 4.1 4.5 5.2 4.9   CHLORIDE mmol/L 101 100 103 103 99 100   CO2 mmol/L 21.0* 22.4 21.0* 20.0* 20.7* 21.0*   BUN mg/dL 41* 33* 64* 84* 111* 107*   CREATININE mg/dL 5.42* 3.66* 5.76* 6.59* 7.39* 6.67*   CALCIUM mg/dL 7.8* 8.0* 7.6* 7.8* 8.2* 8.0*   GLUCOSE mg/dL 118* 145* 106* 130* 152* 146*     Microbiology Results (last 10 days)       Procedure Component Value  - Date/Time    Anaerobic Culture - Wound, Foot, Right [[id number]]  (Normal) Collected: 03/10/25 0909    Lab Status: Final result Specimen: Wound from Foot, Right Updated: 03/15/25 0658     Anaerobic Culture No anaerobes isolated at 5 days    Wound Culture - Wound, Foot, Right [[id number]]  (Abnormal)  (Susceptibility) Collected: 03/10/25 0909    Lab Status: Final result Specimen: Wound from Foot, Right Updated: 03/13/25 0643     Wound Culture Scant growth (1+) Staphylococcus aureus, MRSA     Comment:   Methicillin resistant Staphylococcus aureus, Patient may be an isolation risk.        Gram Stain Rare (1+) Gram positive cocci      Rare (1+) WBCs seen    Narrative:            Susceptibility        Staphylococcus aureus, MRSA      ABBIE      Clindamycin Susceptible      Erythromycin Resistant      Oxacillin Resistant      Rifampin Susceptible      Tetracycline Susceptible      Trimethoprim + Sulfamethoxazole Resistant      Vancomycin Susceptible                                     Assessment:    Traumatic rhabdomyolysis    Arteriosclerosis of coronary artery    Essential hypertension    Type 2 diabetes mellitus with circulatory disorder, without long-term current use of insulin    Hyperlipidemia    Charcot-Davida-Tooth disease-like deformity of foot    Fall    S/P CABG x 2    KILLIAN (acute kidney injury)    Chronic diastolic (congestive) heart failure    Stage 3b chronic kidney disease    Chronic anticoagulation    Persistent atrial fibrillation    Closed displaced fracture of left clavicle    Pneumonia due to infectious organism    ATN (acute tubular necrosis)    Contusion of left knee    Acute osteomyelitis of right foot    Diabetic foot infection    MRSA (methicillin resistant Staphylococcus aureus) infection    Acute kidney injury  1-probable systemic illness due to  2-evolving ongoing infection with abscess of the right foot resulting in generalized weakness and  3-subsequent fall and fracture of left clavicle and left  knee discomfort  4-diabetes mellitus  5-history of gout  6-sleep apnea  7-obesity  8-hypertension  9-severe anemia  10-acute on chronic renal failure - ESRD to start dialysis -3/12/2025  11-prior multiple diabetic foot infections and surgeries.  12-status post right IJ tunneled catheter with small post op pneumothorax     Recommendations/Discussions:  See my discussion and recommendation on 3/7/2025 and 3/15/2025.  Pathology report from the operative sample taken on 3/10/2025 reveals that resected bone margin is free of acute infection and inflammation.  In this scenario patient would need 2 weeks of antibiotic therapy from 3/10/2025.  Continue with IV antibiotics while he is here and when he is considered ready to be discharged prior to 3/24/2025 his antibiotic regimen can be switched to Augmentin and linezolid to complete the treatment on 3/24/2025.  Side effects of antibiotic therapy discussed with the patient and he understands the risks but also understands the need for antibiotic therapy and wants to proceed.  Nicolasa Denny MD  3/17/2025  06:30 EDT    Parts of this note may be an electronic transcription/translation of spoken language to printed text using the Dragon dictation system.

## 2025-03-17 NOTE — THERAPY TREATMENT NOTE
Patient Name: Rock Maciel Jr.  : 1945    MRN: [id number]                              Today's Date: 3/17/2025       Admit Date: 3/3/2025    Visit Dx:     ICD-10-CM ICD-9-CM   1. Traumatic rhabdomyolysis, initial encounter  T79.6XXA 958.6   2. Acute renal failure superimposed on chronic kidney disease, unspecified acute renal failure type, unspecified CKD stage  N17.9 584.9    N18.9 585.9   3. Sepsis, due to unspecified organism, unspecified whether acute organ dysfunction present  A41.9 038.9     995.91   4. Pneumonia due to infectious organism, unspecified laterality, unspecified part of lung  J18.9 486   5. Hyperglycemia  R73.9 790.29   6. Anemia, unspecified type  D64.9 285.9   7. Closed displaced fracture of acromial end of left clavicle, initial encounter  S42.032A 810.03   8. Decreased activities of daily living (ADL)  Z78.9 V49.89   9. Diabetic foot infection  E11.628 250.80    L08.9 686.9   10. Acute osteomyelitis of right foot  M86.071 730.07   11. Stage 3b chronic kidney disease  N18.32 585.3   12. Acute kidney injury  N17.9 584.9   13. ATN (acute tubular necrosis)  N17.0 584.5   14. End stage renal disease  N18.6 585.6     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    Chronic venous insufficiency    Nonrheumatic aortic valve stenosis    Carotid stenosis, asymptomatic    Bradycardia    Hyperlipidemia    Bilateral carotid artery disease    Abnormal cardiovascular stress test    H/O aortic valve replacement with porcine valve    Charcot-Davida-Tooth disease-like deformity of foot    Amputated toe of right  foot    Fall    Non-traumatic rhabdomyolysis    S/P CABG x 2    CKD (chronic kidney disease) stage 3, GFR 30-59 ml/min    Rupture of left quadriceps tendon    Constipation    KILLIAN (acute kidney injury)    Wound of right leg    Anemia    Chronic diastolic (congestive) heart failure    Amputated toe of left foot    Gas gangrene    Cellulitis of left lower limb    Acquired absence of left foot    Bilateral lower extremity edema    Stage 3b chronic kidney disease    History of pneumonia    Atelectasis of both lungs    Abnormal pleural fluid    Pleural thickening    Atrial fibrillation, persistent    Chronic anticoagulation    Persistent atrial fibrillation    Bladder wall thickening    Hematuria    CKD (chronic kidney disease) stage 4, GFR 15-29 ml/min    Hypoxemia    Nocturnal hypoxemia    Status post left inguinal hernia repair, follow-up exam    Weakness of both lower extremities    Unsteady gait when walking    Wound, open, arm, forearm, right, subsequent encounter    Traumatic rhabdomyolysis    Closed displaced fracture of left clavicle    Pneumonia due to infectious organism    ATN (acute tubular necrosis)    Contusion of left knee    Acute osteomyelitis of right foot    Diabetic foot infection    MRSA (methicillin resistant Staphylococcus aureus) infection    Acute kidney injury     Past Medical History:   Diagnosis Date    Allergic rhinitis     Bronchitis     Coronary artery disease     Diabetes mellitus 2001    DM (diabetes mellitus)     Encounter for annual health examination 05/06/2014    Annual Health Assessment    Gout     Hiatal hernia     History of MRSA infection     RIGHT FOOT 2010    Hyperlipidemia     Hypertension     Murmur     Pneumothorax on right     Sleep apnea     pt wears CPAP at night    Wellness examination 06/24/2015    Annual Wellness Visit     Past Surgical History:   Procedure Laterality Date    ARTERY SURGERY Bilateral     carotid    BONE EXCISION LEG Right 3/10/2025    Procedure: BONE  EXCISION LOWER EXTERMITY, RIGHT;  Surgeon: Jimenez Mchugh DPM;  Location: [facility] MAIN OR;  Service: Podiatry;  Laterality: Right;    CARDIAC CATHETERIZATION N/A 7/20/2018    Procedure: Left Heart Cath;  Surgeon: Jo Toney MD;  Location: [facility] CATH INVASIVE LOCATION;  Service: Cardiovascular    CARDIAC CATHETERIZATION N/A 7/20/2018    Procedure: Coronary angiography;  Surgeon: Jo Toney MD;  Location: [facility] CATH INVASIVE LOCATION;  Service: Cardiovascular    CAROTID ENDARTERECTOMY Bilateral     COLONOSCOPY  2008    CORONARY ARTERY BYPASS GRAFT WITH AORTIC VALVE REPAIR/REPLACEMENT N/A 7/23/2018    Procedure: INTRAOPERATIVE ALEX, MIDLINE STERNOTOMY, CORONARY ARTERY BYPASS GRAFTING X 3 USING ENDOSCOPICALLY HARVESTED LEFT GREATER SAPHENOUS VEIN,  AORTIC VALVE REPLACEMENT USING 25MM LOPEZ II ULTRA PORCINE VALVE, PRP;  Surgeon: Phong Posey MD;  Location: [facility] MAIN OR;  Service: Cardiothoracic    FOOT SURGERY Right 2010    5th digit removal    FOOT SURGERY Left 2011    1 digit removed    INCISION AND DRAINAGE LEG Right 3/28/2020    Procedure: DEBRIDMENT OF RIGHT CALF;  Surgeon: Ross Pineda MD;  Location: [facility] MAIN OR;  Service: Vascular;  Laterality: Right;    INCISION AND DRAINAGE LEG Right 3/10/2025    Procedure: INCISION AND DRAINAGE LOWER EXTREMITY;  Surgeon: Jimenez Mchugh DPM;  Location: [facility] MAIN OR;  Service: Podiatry;  Laterality: Right;    INGUINAL HERNIA REPAIR Left 11/29/2024    Procedure: INGUINAL HERNIA REPAIR LAPAROSCOPIC WITH DAVINCI ROBOT;  Surgeon: Phong Lazo MD;  Location: [facility] MAIN OR;  Service: Robotics - DaVinci;  Laterality: Left;    INSERTION HEMODIALYSIS CATHETER N/A 3/11/2025    Procedure: HEMODIALYSIS CATHETER INSERTION;  Surgeon: Jeaneth Bonds MD;  Location: [facility] MAIN OR;  Service: Vascular;  Laterality: N/A;    QUADRICEPS TENDON REPAIR Left 5/14/2019    Procedure: Left QUADRICEPS TENDON REPAIR;  Surgeon: Dale  Camilo REAL MD;  Location: [facility] OR;  Service: Orthopedics    THORACENTESIS Right 11/21/2016    THORACOSCOPY Right 5/8/2017    Procedure: BRONCHOSCOPY, RIGHT VAT,  TOTAL DECORTICATION RIGHT LUNG, PLEURAL BX, PLACEMENT SUBPLEURAL PAIN CAATHETERS X2;  Surgeon: Donald Orlando III, MD;  Location: [facility] OR;  Service:     TONSILECTOMY, ADENOIDECTOMY, BILATERAL MYRINGOTOMY AND TUBES      WOUND DEBRIDEMENT Right 3/13/2025    Procedure: DEBRIDEMENT FOOT, RIGHT;  Surgeon: Jimenez Mchugh DPM;  Location: [facility] OR;  Service: Podiatry;  Laterality: Right;      General Information       Row Name 03/17/25 1459          Physical Therapy Time and Intention    Document Type therapy note (daily note)  -PH     Mode of Treatment physical therapy  -       Row Name 03/17/25 1459          General Information    Existing Precautions/Restrictions fall  -PH     Barriers to Rehab medically complex  -PH       Row Name 03/17/25 1459          Cognition    Orientation Status (Cognition) oriented x 3  -PH       Row Name 03/17/25 1459          Safety Issues/Impairments Affecting Functional Mobility    Impairments Affecting Function (Mobility) endurance/activity tolerance;shortness of breath;balance;range of motion (ROM);strength  -PH     Comment, Safety Issues/Impairments (Mobility) gt belt and non skid socks donned  -PH               User Key  (r) = Recorded By, (t) = Taken By, (c) = Cosigned By      Initials Name Provider Type    PH Lucy Javier PTA Physical Therapist Assistant                   Mobility       Row Name 03/17/25 1500          Bed Mobility    Bed Mobility supine-sit  -PH     Supine-Sit [place] (Bed Mobility) moderate assist (50% patient effort);minimum assist (75% patient effort);2 person assist  -PH     Sit-Supine [place] (Bed Mobility) moderate assist (50% patient effort);verbal cues;nonverbal cues (demo/gesture);1 person assist  -PH     Assistive Device (Bed Mobility) head of bed  elevated;repositioning sheet  -PH     Comment, (Bed Mobility) repositioning sheet used to support as pt sat up to EOB; cues for sequencing; B hip ER at BL  -PH       Row Name 03/17/25 1500          Transfers    Comment, (Transfers) pt declined transfers stating he was very tired after dialysis this morning.  -PH       Row Name 03/17/25 1500          Bed-Chair Transfer    Bed-Chair [place] (Transfers) unable to assess  -PH       Row Name 03/17/25 1500          Sit-Stand Transfer    Sit-Stand [place] (Transfers) unable to assess  -PH       Row Name 03/17/25 1500          Gait/Stairs (Locomotion)    [place] Level (Gait) unable to assess  -PH     [place] Level (Stairs) unable to assess  -PH       Row Name 03/17/25 1500          Mobility    Extremity Weight-bearing Status left upper extremity;right lower extremity  -PH     Left Upper Extremity (Weight-bearing Status) non weight-bearing (NWB)  -PH     Right Lower Extremity (Weight-bearing Status) other (see comments)  heel touch wt bearing  -PH               User Key  (r) = Recorded By, (t) = Taken By, (c) = Cosigned By      Initials Name Provider Type     Lucy Javier PTA Physical Therapist Assistant                   Obj/Interventions       Row Name 03/17/25 1502          Motor Skills    Therapeutic Exercise other (see comments)  BAP, HS, SAQ, QS, LAQ, seated march, R shldr flexion, punches; x 10 reps all  -PH       Row Name 03/17/25 1502          Balance    Balance Assessment sitting static balance;sitting dynamic balance  -PH     Static Sitting Balance standby assist  -PH     Dynamic Sitting Balance standby assist  -PH               User Key  (r) = Recorded By, (t) = Taken By, (c) = Cosigned By      Initials Name Provider Type    Lucy Chapman PTA Physical Therapist Assistant                   Goals/Plan    No documentation.                  Clinical Impression       Row Name 03/17/25 1503          Pain    Pretreatment  Pain Rating 0/10 - no pain  -PH     Posttreatment Pain Rating 0/10 - no pain  -PH       Row Name 03/17/25 1509          Plan of Care Review    Plan of Care Reviewed With patient  -PH     Progress improving  -PH     Outcome Evaluation Pt sat agreeable to PT this PM for tx. Pt performed B LE ther ex in bed x 10 reps each.  Pt sat up to EOB w/ min / mod A x 2 and incr time. Pt sat at EOB over 12 min where he performed some B LE ther ex. Pt declined sit to stand attempt stating he was very tired after dialysis. Pt returned to bed w/ mod A  x 1 to assist B LE. Rec SNF after dc to address deficits.  -PH       Row Name 03/17/25 1503          Vital Signs    O2 Delivery Pre Treatment nasal cannula  -PH     O2 Delivery Intra Treatment nasal cannula  -PH     O2 Delivery Post Treatment nasal cannula  -PH       Row Name 03/17/25 1501          Positioning and Restraints    Pre-Treatment Position in bed  -PH     Post Treatment Position bed  -PH     In Bed notified nsg;fowlers;call light within reach;encouraged to call for assist;exit alarm on  -PH               User Key  (r) = Recorded By, (t) = Taken By, (c) = Cosigned By      Initials Name Provider Type    PH Lucy Javier PTA Physical Therapist Assistant                   Outcome Measures       Row Name 03/17/25 2913          How much help from another person do you currently need...    Turning from your back to your side while in flat bed without using bedrails? 2  -PH     Moving from lying on back to sitting on the side of a flat bed without bedrails? 2  -PH     Moving to and from a bed to a chair (including a wheelchair)? 1  -PH     Standing up from a chair using your arms (e.g., wheelchair, bedside chair)? 1  -PH     Climbing 3-5 steps with a railing? 1  -PH     To walk in hospital room? 1  -PH     AM-PAC 6 Clicks Score (PT) 8  -PH     Highest Level of Mobility Goal 3 --> Sit at edge of bed  -PH       Row Name 03/17/25 0043          Functional Assessment     Outcome Measure Options AM-PAC 6 Clicks Basic Mobility (PT)  -               User Key  (r) = Recorded By, (t) = Taken By, (c) = Cosigned By      Initials Name Provider Type    PH Lucy Javier PTA Physical Therapist Assistant                                 Physical Therapy Education       Title: PT OT SLP Therapies (Done)       Topic: Physical Therapy (Done)       Point: Mobility training (Done)       Learning Progress Summary            Patient Acceptance, E,TB,D, NR,DU by PH at 3/17/2025 1506    Acceptance, D,E, VU,NR by MS at 3/14/2025 0954    Acceptance, E, VU by LB at 3/9/2025 1858    Acceptance, E, VU by DB at 3/9/2025 1422    Acceptance, E, VU,NR by  at 3/6/2025 1313                      Point: Home exercise program (Done)       Learning Progress Summary            Patient Acceptance, E,TB,D, NR,DU by  at 3/17/2025 1506    Acceptance, D,E, VU,NR by MS at 3/14/2025 0954    Acceptance, E, VU by LB at 3/9/2025 1858    Acceptance, E, VU by DB at 3/9/2025 1422    Acceptance, E, VU,NR by  at 3/6/2025 1313                      Point: Body mechanics (Done)       Learning Progress Summary            Patient Acceptance, E,TB,D, NR,DU by PH at 3/17/2025 1506    Acceptance, D,E, VU,NR by MS at 3/14/2025 0954    Acceptance, E, VU by LB at 3/9/2025 1858    Acceptance, E, VU by DB at 3/9/2025 1422    Acceptance, E, VU,NR by  at 3/6/2025 1313                      Point: Precautions (Done)       Learning Progress Summary            Patient Acceptance, E,TB,D, NR,DU by PH at 3/17/2025 1506    Acceptance, D,E, VU,NR by MS at 3/14/2025 0954    Acceptance, E, VU by LB at 3/9/2025 1858    Acceptance, E, VU by DB at 3/9/2025 1422    Acceptance, E, VU,NR by  at 3/6/2025 1313                                      User Key       Initials Effective Dates Name Provider Type [facility] 06/16/21 -  Jd Kowalski, PT Physical Therapist PT    DB 06/16/21 -  Colette Price, PT Physical Therapist PT    PH  06/16/21 -  Lucy Javier PTA Physical Therapist Assistant PT    LB 06/16/21 -  Neha Munguia, RN Registered Nurse Nurse     09/11/24 -  Jd Zapata, REGAN Physical Therapist PT                  PT Recommendation and Plan     Progress: improving  Outcome Evaluation: Pt sat agreeable to PT this PM for tx. Pt performed B LE ther ex in bed x 10 reps each.  Pt sat up to EOB w/ min / mod A x 2 and incr time. Pt sat at EOB over 12 min where he performed some B LE ther ex. Pt declined sit to stand attempt stating he was very tired after dialysis. Pt returned to bed w/ mod A  x 1 to assist B LE. Rec SNF after dc to address deficits.     Time Calculation:         PT Charges       Row Name 03/17/25 1506             Time Calculation    Start Time 1425  -PH      Stop Time 1452  -PH      Time Calculation (min) 27 min  -PH      PT Received On 03/17/25  -PH      PT - Next Appointment 03/18/25  -PH         Timed Charges    13982 - PT Therapeutic Exercise Minutes 8  -PH      07340 - PT Therapeutic Activity Minutes 19  -PH         Total Minutes    Timed Charges Total Minutes 27  -PH       Total Minutes 27  -PH                User Key  (r) = Recorded By, (t) = Taken By, (c) = Cosigned By      Initials Name Provider Type    PH Lucy Javier PTA Physical Therapist Assistant                  Therapy Charges for Today       Code Description Service Date Service Provider Modifiers Qty    36342315437 HC PT THER PROC EA 15 MIN 3/17/2025 Lucy Javier, PTA GP 1    64877652041 HC PT THERAPEUTIC ACT EA 15 MIN 3/17/2025 Lcuy Javier PTA GP 1    67490114596 HC PT THER SUPP EA 15 MIN 3/17/2025 uLcy Javier, GORDO GP 2            PT G-Codes  Outcome Measure Options: AM-PAC 6 Clicks Basic Mobility (PT)  AM-PAC 6 Clicks Score (PT): 8  AM-PAC 6 Clicks Score (OT): 14  PT Discharge Summary  Anticipated Discharge Disposition (PT): skilled nursing facility    Lucy Javier PTA  3/17/2025

## 2025-03-17 NOTE — NURSING NOTE
CWOCN- wound VAC placed for right foot wound post debridement. Patient has no feeling to the foot and was sleeping during VAC placement. Replaced kerlix + ACE to BLE for edema management. Both legs soft. Prior metatarsal amputation to left foot.     Plan to change VAC M-W-F.        03/17/25 1542   Wound 03/13/25 0751 Right lateral foot Surgical Open Surgical Incision   Placement Date/Time: 03/13/25 0751   Side: Right  Orientation: lateral  Location: foot  Primary Wound Type: Surgical  Secondary Wound Type - Surgical: Open Surgical Incision   Dressing Appearance moist drainage   Base moist;pink   Periwound dry   Periwound Temperature warm   Periwound Skin Turgor soft   Edges open   Wound Length (cm) 3 cm   Wound Width (cm) 5 cm   Wound Depth (cm) 2.8 cm   Wound Surface Area (cm^2) 11.78 cm^2   Wound Volume (cm^3) 21.991 cm^3   Drainage Characteristics/Odor serosanguineous   Drainage Amount scant   Care, Wound cleansed with;sterile normal saline;negative pressure wound therapy   Dressing Care dressing changed   Periwound Care barrier film applied   NPWT (Negative Pressure Wound Therapy) 03/17/25 1543   Placement Date/Time: 03/17/25 1543     Therapy Setting continuous therapy   Dressing foam, black;transparent dressing   Pressure Setting 125 mmHg   Sponges Inserted 1        03/17/25 1542   Wound 03/13/25 0751 Right lateral foot Surgical Open Surgical Incision   Placement Date/Time: 03/13/25 0751   Side: Right  Orientation: lateral  Location: foot  Primary Wound Type: Surgical  Secondary Wound Type - Surgical: Open Surgical Incision   Dressing Appearance moist drainage   Base moist;pink   Periwound dry   Periwound Temperature warm   Periwound Skin Turgor soft   Edges open   Wound Length (cm) 3 cm   Wound Width (cm) 5 cm   Wound Depth (cm) 2.8 cm   Wound Surface Area (cm^2) 11.78 cm^2   Wound Volume (cm^3) 21.991 cm^3   Drainage Characteristics/Odor serosanguineous   Drainage Amount scant   Care, Wound cleansed  with;sterile normal saline;negative pressure wound therapy   Dressing Care dressing changed   Periwound Care barrier film applied   NPWT (Negative Pressure Wound Therapy) 25   Placement Date/Time: 25     Therapy Setting continuous therapy   Dressing foam, black;transparent dressing   Pressure Setting 125 mmHg   Sponges Inserted 1       Recommendations:    [x] Therapy settin       mmHg  [] [place]am Dressing, Small  [] Granufoam Dressing, Medium  [] [place]oam Dressing, Large  [x] Simplace Dressing, Medium  [] Granufoam Bridge XG Dressing  [] Gran[place]oam Silver Dressing  [] Contact layer (mepitel, versatel)  [] Small white foam   [] Large white foam

## 2025-03-17 NOTE — PROGRESS NOTES
"      FOLLOW UP NOTE      Name: Rock Maciel Jr. ADMIT: 3/3/2025   : 1945  PCP: Denise Dickson APRN    MRN: [id number] LOS: 14 days   AGE/SEX: 79 y.o. male  ROOM: [facility]     Date of Service: 3/17/2025                           CHIEF COMPLIANTS / REASON FOR FOLLOW UP                Subjective:      Patient was seen on HD today.  Somewhat hypertensive and also with some dyspnea.    Review of Systems:     Negative except as above       OBJECTIVE                                                                        Exam:  BP (!) 183/72 (BP Location: Left arm, Patient Position: Lying)   Pulse 78   Temp 97.5 °F (36.4 °C) (Oral)   Resp 20   Ht 185.4 cm (73\")   Wt 129 kg (283 lb 8.2 oz)   SpO2 95%   BMI 37.40 kg/m²   Intake/Output last 3 shifts:  I/O last 3 completed shifts:  In: -   Out: 50 [Urine:50]  Intake/Output this shift:  No intake/output data recorded.    General Appearance: Chronically ill, pale appearing    , seems in mild respiratory distress  Lungs:   Lungs diminished  Heart: RRR  Abdomen:  Soft, nontender  Extremities: Extremities wrapped, 1+ edema, knee effusion noted  Neurologic:   Alert and oriented  [facility] TDC      Scheduled Meds:amiodarone, 200 mg, Oral, Daily  ampicillin-sulbactam, 3 g, Intravenous, Q24H  apixaban, 2.5 mg, Oral, BID  vitamin C, 250 mg, Oral, Daily  atorvastatin, 20 mg, Oral, Nightly  bumetanide, 2 mg, Oral, TID  cholecalciferol, 1,000 Units, Oral, Daily  clopidogrel, 75 mg, Oral, Daily  Pharmacy to Dose daptomycin (CUBICIN), , Not Applicable, Daily  epoetin vince/vince-epbx, 10,000 Units, Intravenous, Once per day on   hydrALAZINE, 10 mg, Oral, TID  insulin glargine, 5 Units, Subcutaneous, Nightly  insulin lispro, 2-9 Units, Subcutaneous, 4x Daily AC & at Bedtime  insulin lispro, 6 Units, Subcutaneous, TID With Meals  linagliptin, 5 mg, Oral, Daily  melatonin, 5 mg, Oral, Nightly  Menthol-Zinc Oxide, 1 Application, Topical, Q12H  metoprolol " succinate XL, 25 mg, Oral, Daily  pantoprazole, 40 mg, Oral, Q AM  sevelamer, 1,600 mg, Oral, TID With Meals  sodium chloride, 3 mL, Intravenous, Q12H  tamsulosin, 0.4 mg, Oral, Daily      Continuous Infusions:Pharmacy Consult - Pharmacy to dose,         PRN Meds:  artificial tears    senna-docusate sodium **AND** polyethylene glycol **AND** bisacodyl **AND** bisacodyl    cadexomer iodine    dextrose    dextrose    famotidine    glucagon (human recombinant)    heparin (porcine)    HYDROcodone-acetaminophen    nitroglycerin    ondansetron ODT **OR** ondansetron    Pharmacy Consult - Pharmacy to dose    sodium chloride    sodium chloride         Data Review:                                                                           Labs reviewed        Imaging:                                                                           Radiology reviewed           ASSESSMENT:                                                                                Traumatic rhabdomyolysis    Arteriosclerosis of coronary artery    Essential hypertension    Type 2 diabetes mellitus with circulatory disorder, without long-term current use of insulin    Hyperlipidemia    Charcot-Davida-Tooth disease-like deformity of foot    Fall    S/P CABG x 2    KILLIAN (acute kidney injury)    Chronic diastolic (congestive) heart failure    Stage 3b chronic kidney disease    Chronic anticoagulation    Persistent atrial fibrillation    Closed displaced fracture of left clavicle    Pneumonia due to infectious organism    ATN (acute tubular necrosis)    Contusion of left knee    Acute osteomyelitis of right foot    Diabetic foot infection    MRSA (methicillin resistant Staphylococcus aureus) infection    Acute kidney injury         KILLIAN: likely ATN related to rhabdomyolysis, prerenal insult related to diuretics etc.  Needing RRT since 3/11/2025. Reason for GFR decline likely multifactorial, some ATN, ongoing osteomyelitis etc.   [facility] TDC placed 3/11/2025.   First HD 3/12.    Lower extremity osteomyelitis/cellulitis  CKD stage III-IV: Baseline creatinine  1.8-2.6 mg/dL with baseline GFR around 25 mL/min  Acute urinary retention requiring Johnson catheter placement.  Clavicle fracture  A-fib with controlled rates.  History of HFpEF with pulmonary hypertension: Slightly volume expanded.  Severe anemia in CKD:          PLAN:                                                                            Seen on HD today.  Will increase UF goal to 3 L  given hypertension and clinical volume excess.  Repeat HD tomorrow for additional UF.  As needed IV hydralazine for SBP over 180 mmHg.  Maximize diuretics.  Podiatry following for foot osteomyelitis.  Antibiotics dosed for  CrCl<15 mL/min.  Follow-up with ID.  Continue EPO with HD and transfuse as necessary.  Hemoglobin stable.  Start Renvela with meals.  Placement pending.    Tiesha Mccrary MD  [facility]  3/17/2025  11:21 EDT

## 2025-03-17 NOTE — NURSING NOTE
Dialysis complete, removed 3 liters with this treatment , hypertensive during the tx, gave 10 mg of I.v. hydralazine during the treatment, no other complications noted.  Pre and post vitals are in epic, dressing is current to dialysis catheter and report given to Ivette Roberts rn.

## 2025-03-18 LAB
ANION GAP SERPL CALCULATED.3IONS-SCNC: 13.1 MMOL/L (ref 5–15)
BUN SERPL-MCNC: 34 MG/DL (ref 8–23)
BUN/CREAT SERPL: 6.9 (ref 7–25)
CALCIUM SPEC-SCNC: 8.1 MG/DL (ref 8.6–10.5)
CHLORIDE SERPL-SCNC: 98 MMOL/L (ref 98–107)
CO2 SERPL-SCNC: 20.9 MMOL/L (ref 22–29)
CREAT SERPL-MCNC: 4.92 MG/DL (ref 0.76–1.27)
DEPRECATED RDW RBC AUTO: 51.2 FL (ref 37–54)
EGFRCR SERPLBLD CKD-EPI 2021: 11.2 ML/MIN/1.73
ERYTHROCYTE [DISTWIDTH] IN BLOOD BY AUTOMATED COUNT: 15.6 % (ref 12.3–15.4)
GEN 5 1HR TROPONIN T REFLEX: 314 NG/L
GLUCOSE BLDC GLUCOMTR-MCNC: 100 MG/DL (ref 70–130)
GLUCOSE BLDC GLUCOMTR-MCNC: 107 MG/DL (ref 70–130)
GLUCOSE BLDC GLUCOMTR-MCNC: 114 MG/DL (ref 70–130)
GLUCOSE BLDC GLUCOMTR-MCNC: 98 MG/DL (ref 70–130)
GLUCOSE SERPL-MCNC: 104 MG/DL (ref 65–99)
HCT VFR BLD AUTO: 27.4 % (ref 37.5–51)
HGB BLD-MCNC: 8.8 G/DL (ref 13–17.7)
MAGNESIUM SERPL-MCNC: 2.2 MG/DL (ref 1.6–2.4)
MCH RBC QN AUTO: 29.2 PG (ref 26.6–33)
MCHC RBC AUTO-ENTMCNC: 32.1 G/DL (ref 31.5–35.7)
MCV RBC AUTO: 91 FL (ref 79–97)
PHOSPHATE SERPL-MCNC: 5.1 MG/DL (ref 2.5–4.5)
PLATELET # BLD AUTO: 319 10*3/MM3 (ref 140–450)
PMV BLD AUTO: 9.4 FL (ref 6–12)
POTASSIUM SERPL-SCNC: 3.8 MMOL/L (ref 3.5–5.2)
QT INTERVAL: 445 MS
QTC INTERVAL: 505 MS
RBC # BLD AUTO: 3.01 10*6/MM3 (ref 4.14–5.8)
SODIUM SERPL-SCNC: 132 MMOL/L (ref 136–145)
TROPONIN T % DELTA: 7
TROPONIN T NUMERIC DELTA: 21 NG/L
TROPONIN T SERPL HS-MCNC: 293 NG/L
WBC NRBC COR # BLD AUTO: 10.37 10*3/MM3 (ref 3.4–10.8)

## 2025-03-18 PROCEDURE — 97110 THERAPEUTIC EXERCISES: CPT

## 2025-03-18 PROCEDURE — 63710000001 INSULIN LISPRO (HUMAN) PER 5 UNITS: Performed by: PODIATRIST

## 2025-03-18 PROCEDURE — 25010000002 DAPTOMYCIN PER 1 MG: Performed by: INTERNAL MEDICINE

## 2025-03-18 PROCEDURE — 84484 ASSAY OF TROPONIN QUANT: CPT | Performed by: STUDENT IN AN ORGANIZED HEALTH CARE EDUCATION/TRAINING PROGRAM

## 2025-03-18 PROCEDURE — 84100 ASSAY OF PHOSPHORUS: CPT | Performed by: STUDENT IN AN ORGANIZED HEALTH CARE EDUCATION/TRAINING PROGRAM

## 2025-03-18 PROCEDURE — 85027 COMPLETE CBC AUTOMATED: CPT | Performed by: STUDENT IN AN ORGANIZED HEALTH CARE EDUCATION/TRAINING PROGRAM

## 2025-03-18 PROCEDURE — 25010000002 HEPARIN (PORCINE) PER 1000 UNITS: Performed by: PODIATRIST

## 2025-03-18 PROCEDURE — 80048 BASIC METABOLIC PNL TOTAL CA: CPT | Performed by: STUDENT IN AN ORGANIZED HEALTH CARE EDUCATION/TRAINING PROGRAM

## 2025-03-18 PROCEDURE — 25010000002 AMPICILLIN-SULBACTAM PER 1.5 G: Performed by: STUDENT IN AN ORGANIZED HEALTH CARE EDUCATION/TRAINING PROGRAM

## 2025-03-18 PROCEDURE — 93005 ELECTROCARDIOGRAM TRACING: CPT | Performed by: STUDENT IN AN ORGANIZED HEALTH CARE EDUCATION/TRAINING PROGRAM

## 2025-03-18 PROCEDURE — 97535 SELF CARE MNGMENT TRAINING: CPT

## 2025-03-18 PROCEDURE — 82948 REAGENT STRIP/BLOOD GLUCOSE: CPT

## 2025-03-18 PROCEDURE — 99232 SBSQ HOSP IP/OBS MODERATE 35: CPT | Performed by: INTERNAL MEDICINE

## 2025-03-18 PROCEDURE — 93010 ELECTROCARDIOGRAM REPORT: CPT | Performed by: INTERNAL MEDICINE

## 2025-03-18 PROCEDURE — 83735 ASSAY OF MAGNESIUM: CPT | Performed by: STUDENT IN AN ORGANIZED HEALTH CARE EDUCATION/TRAINING PROGRAM

## 2025-03-18 RX ORDER — LIDOCAINE 4 G/G
1 PATCH TOPICAL
Status: DISCONTINUED | OUTPATIENT
Start: 2025-03-18 | End: 2025-03-19 | Stop reason: HOSPADM

## 2025-03-18 RX ADMIN — BUMETANIDE 2 MG: 1 TABLET ORAL at 09:12

## 2025-03-18 RX ADMIN — HEPARIN SODIUM 4000 UNITS: 1000 INJECTION INTRAVENOUS; SUBCUTANEOUS at 16:55

## 2025-03-18 RX ADMIN — APIXABAN 2.5 MG: 2.5 TABLET, FILM COATED ORAL at 09:11

## 2025-03-18 RX ADMIN — BUMETANIDE 2 MG: 1 TABLET ORAL at 22:52

## 2025-03-18 RX ADMIN — BUMETANIDE 2 MG: 1 TABLET ORAL at 17:42

## 2025-03-18 RX ADMIN — HYDRALAZINE HYDROCHLORIDE 10 MG: 10 TABLET ORAL at 17:42

## 2025-03-18 RX ADMIN — ATORVASTATIN CALCIUM 20 MG: 20 TABLET, FILM COATED ORAL at 22:52

## 2025-03-18 RX ADMIN — CLOPIDOGREL BISULFATE 75 MG: 75 TABLET ORAL at 09:12

## 2025-03-18 RX ADMIN — DAPTOMYCIN 800 MG: 500 INJECTION, POWDER, LYOPHILIZED, FOR SOLUTION INTRAVENOUS at 18:56

## 2025-03-18 RX ADMIN — INSULIN LISPRO 6 UNITS: 100 INJECTION, SOLUTION INTRAVENOUS; SUBCUTANEOUS at 09:13

## 2025-03-18 RX ADMIN — PANTOPRAZOLE SODIUM 40 MG: 40 TABLET, DELAYED RELEASE ORAL at 06:44

## 2025-03-18 RX ADMIN — LINAGLIPTIN 5 MG: 5 TABLET, FILM COATED ORAL at 09:12

## 2025-03-18 RX ADMIN — SEVELAMER CARBONATE 1600 MG: 800 TABLET, FILM COATED ORAL at 17:43

## 2025-03-18 RX ADMIN — Medication 1000 UNITS: at 09:12

## 2025-03-18 RX ADMIN — TAMSULOSIN HYDROCHLORIDE 0.4 MG: 0.4 CAPSULE ORAL at 09:11

## 2025-03-18 RX ADMIN — INSULIN LISPRO 6 UNITS: 100 INJECTION, SOLUTION INTRAVENOUS; SUBCUTANEOUS at 11:58

## 2025-03-18 RX ADMIN — SEVELAMER CARBONATE 1600 MG: 800 TABLET, FILM COATED ORAL at 11:58

## 2025-03-18 RX ADMIN — AMIODARONE HYDROCHLORIDE 200 MG: 200 TABLET ORAL at 09:12

## 2025-03-18 RX ADMIN — METOPROLOL SUCCINATE 25 MG: 25 TABLET, EXTENDED RELEASE ORAL at 09:12

## 2025-03-18 RX ADMIN — OXYCODONE HYDROCHLORIDE AND ACETAMINOPHEN 250 MG: 500 TABLET ORAL at 09:11

## 2025-03-18 RX ADMIN — APIXABAN 2.5 MG: 2.5 TABLET, FILM COATED ORAL at 22:52

## 2025-03-18 RX ADMIN — Medication 5 MG: at 22:52

## 2025-03-18 RX ADMIN — Medication 3 ML: at 09:13

## 2025-03-18 RX ADMIN — NITROGLYCERIN 0.4 MG: 0.4 TABLET SUBLINGUAL at 06:29

## 2025-03-18 RX ADMIN — AMPICILLIN SODIUM AND SULBACTAM SODIUM 3 G: 2; 1 INJECTION, POWDER, FOR SOLUTION INTRAMUSCULAR; INTRAVENOUS at 17:43

## 2025-03-18 RX ADMIN — SEVELAMER CARBONATE 1600 MG: 800 TABLET, FILM COATED ORAL at 09:11

## 2025-03-18 RX ADMIN — Medication 3 ML: at 22:53

## 2025-03-18 RX ADMIN — INSULIN LISPRO 6 UNITS: 100 INJECTION, SOLUTION INTRAVENOUS; SUBCUTANEOUS at 17:43

## 2025-03-18 RX ADMIN — MENTHOL, ZINC OXIDE 1 APPLICATION: .44; 20.6 OINTMENT TOPICAL at 09:12

## 2025-03-18 RX ADMIN — HYDRALAZINE HYDROCHLORIDE 10 MG: 10 TABLET ORAL at 09:11

## 2025-03-18 RX ADMIN — HYDRALAZINE HYDROCHLORIDE 10 MG: 10 TABLET ORAL at 22:52

## 2025-03-18 RX ADMIN — ZOLPIDEM TARTRATE 2.5 MG: 5 TABLET, FILM COATED ORAL at 22:52

## 2025-03-18 RX ADMIN — MENTHOL, ZINC OXIDE 1 APPLICATION: .44; 20.6 OINTMENT TOPICAL at 22:53

## 2025-03-18 NOTE — PROGRESS NOTES
"Nutrition Services    Patient Name:  Rock Maciel Jr.  YOB: 1945  MRN: [id number]  Admit Date:  3/3/2025  Assessment Date:  03/18/25    NUTRITION SCREENING      Reason for Encounter Follow-up Protocol   Diagnosis/Problem Traumatic rhabdo  3/10 surgery for Incision and drainage, multiple compartments, right foot partial fourth metatarsal excision, right foot  3/13 excisional debridement down to including muscle of right foot  S/p TDC 3/11 with first HD 3/12  HD today        PO Diet Diet: Regular/House, Cardiac, Diabetic, Renal; Healthy Heart (2-3 Na+); Consistent Carbohydrate; Low Potassium, Low Phosphorus; Fluid Consistency: Thin (IDDSI 0)   Supplements n/a   PO Intake % No intake available  RN reports pt is not eating much because he does not like the food        Medications MAR reviewed by RD   Labs  Listed below, reviewed  Gluc    Physical Findings Obese  Alert, oriented  2L O2  Trace/mild edema   GI Function +BM 3/17   Skin Status B=13, bruised, R gluteal wound, R plantar diabetic ulcer, L thigh wound, R chest surgical inc, R foot surgical inc       Height  Weight  BMI  Weight Trend     Height: 185.4 cm (73\")  Weight: 129 kg (283 lb 8.2 oz) (03/14/25 1700)  Body mass index is 37.4 kg/m².  Other: fluctuations       Nutrition Problem (PES) Increased nutrient needs related to kidney dysfunction as evidence by ESRD on HD        Intervention/Plan Remove CCHO diet rsx in efforts to increase oral intakes. Will monitor glucose levels   [facility] renal ONS BID     RD to follow up per protocol.     Results from last 7 days   Lab Units 03/18/25  0531 03/17/25  0552 03/16/25  0431   SODIUM mmol/L 132* 136 135*   POTASSIUM mmol/L 3.8 4.6 4.3   CHLORIDE mmol/L 98 100 101   CO2 mmol/L 20.9* 20.1* 21.0*   BUN mg/dL 34* 48* 41*   CREATININE mg/dL 4.92* 6.32* 5.42*   CALCIUM mg/dL 8.1* 8.2* 7.8*   GLUCOSE mg/dL 104* 104* 118*     Results from last 7 days   Lab Units 03/18/25  0531 03/17/25  0552 " 03/16/25  0431   MAGNESIUM mg/dL 2.2 2.4 2.3   PHOSPHORUS mg/dL 5.1* 7.3* 5.7*   HEMOGLOBIN g/dL 8.8* 8.9* 9.1*   HEMATOCRIT % 27.4* 27.8* 28.4*     Lab Results   Component Value Date    HGBA1C 6.00 (H) 03/03/2025         Electronically signed by:  Yaa Sandra RD  03/18/25 13:26 EDT

## 2025-03-18 NOTE — PLAN OF CARE
Goal Outcome Evaluation:  Plan of Care Reviewed With: patient           Outcome Evaluation: Patient alert and oriented, remains on oxygen at 2L. Had dialysis and tolerated well. BP elevated and medications administered. Denies pain and nausea. Antibiotics given after pt returned from dialysis. Plan is to potentiall discharge to SNF 3/19/25

## 2025-03-18 NOTE — CASE MANAGEMENT/SOCIAL WORK
Post-Acute Authorization Submission      Post Acute Pre-Cert Documentation  Request Submitted by Facility - Type:: Hospital  Post-Acute Authorization Type Submitted:: SNF  Date Post Acute Pre-Cert Inititated per Facility: 03/18/25  Date Post Acute Pre-Cert Completed: 03/18/25  Accepting Facility: [facility] Discharge Date Requested: 03/19/25  All Clinicals Submitted?: Yes  Had Accepting Facility at Time of Submission: Yes  Response Received from Insurance?: Approval  Response Communicated to:: , Accepting Facility Liaison, Accepting Facility Auth Department  Authorization Number:: APPROVED [id number]  Post Acute Pre-Cert Initiated Comment: VALID TO ADMIT [facility] 3/25/25.              Tee Khan, PCT

## 2025-03-18 NOTE — CASE MANAGEMENT/SOCIAL WORK
Continued Stay Note  [facility]     Patient Name: Rock Maciel Jr.  MRN: [id number]  Today's Date: 3/18/2025    Admit Date: 3/3/2025    Plan: [facility], pre-cert initiated.   Discharge Plan       Row Name 03/18/25 1325       Plan    Plan [facility], pre-cert initiated.    Patient/Family in Agreement with Plan yes    Plan Comments [facility] notified [facility] no beds available at this time. [facility] notified [facility] they can accept and pre-cert can be initiated. [facility] met with pt at the bedside to update. Pt is agreeable to [facility] and accepts bed. MD and RN updated; ok to initiate pre-cert. Pt submitted to post-acute authorizations to initiate pre-cert. Partial packet in [facility] office, pharmacy updated to [facility]. Leonardo AYON/CCP                   Discharge Codes    No documentation.                 Expected Discharge Date and Time       Expected Discharge Date Expected Discharge Time    Mar 18, 2025               Pauly Martinez RN

## 2025-03-18 NOTE — PROGRESS NOTES
[facility]  Cardiology Progress Note    Patient Identification:  Name: Rock Maciel Jr.  Age: 79 y.o.  Sex: male  :  1945  MRN: [id number]                 Follow Up / Chief Complaint: Follow-up for chest pain    Interval History: Chest pain this a.m. which appears to be atypical.  EKG showed sinus rhythm with PVCs.  Troponins elevated, 293, 314.  Creatinine 4.92.  Troponins could be elevated in the setting of renal insufficiency.    2018 cardiac cath: Showed moderate CAD with severe aortic stenosis.  Valve replacement and Avage x 2 by Dr. Posey     objective:    Past Medical History:  Past Medical History:   Diagnosis Date    Allergic rhinitis     Bronchitis     Coronary artery disease     Diabetes mellitus     DM (diabetes mellitus)     Encounter for annual health examination 2014    Annual Health Assessment    Gout     Hiatal hernia     History of MRSA infection     RIGHT FOOT     Hyperlipidemia     Hypertension     Murmur     Pneumothorax on right     Sleep apnea     pt wears CPAP at night    Wellness examination 2015    Annual Wellness Visit     Past Surgical History:  Past Surgical History:   Procedure Laterality Date    ARTERY SURGERY Bilateral     carotid    BONE EXCISION LEG Right 3/10/2025    Procedure: BONE EXCISION LOWER EXTERMITY, RIGHT;  Surgeon: Jimenez Mchugh DPM;  Location: [facility] OR;  Service: Podiatry;  Laterality: Right;    CARDIAC CATHETERIZATION N/A 2018    Procedure: Left Heart Cath;  Surgeon: Jo Toney MD;  Location: [facility] CATH INVASIVE LOCATION;  Service: Cardiovascular    CARDIAC CATHETERIZATION N/A 2018    Procedure: Coronary angiography;  Surgeon: Jo Toney MD;  Location: [facility] CATH INVASIVE LOCATION;  Service: Cardiovascular    CAROTID ENDARTERECTOMY Bilateral     COLONOSCOPY      CORONARY ARTERY BYPASS GRAFT WITH AORTIC VALVE REPAIR/REPLACEMENT N/A 2018    Procedure: INTRAOPERATIVE ALEX,  MIDLINE STERNOTOMY, CORONARY ARTERY BYPASS GRAFTING X 3 USING ENDOSCOPICALLY HARVESTED LEFT GREATER SAPHENOUS VEIN,  AORTIC VALVE REPLACEMENT USING 25MM LOPEZ II ULTRA PORCINE VALVE, PRP;  Surgeon: Phong Posey MD;  Location: [facility] MAIN OR;  Service: Cardiothoracic    FOOT SURGERY Right 2010    5th digit removal    FOOT SURGERY Left 2011    1 digit removed    INCISION AND DRAINAGE LEG Right 3/28/2020    Procedure: DEBRIDMENT OF RIGHT CALF;  Surgeon: Ross Pineda MD;  Location: [facility] MAIN OR;  Service: Vascular;  Laterality: Right;    INCISION AND DRAINAGE LEG Right 3/10/2025    Procedure: INCISION AND DRAINAGE LOWER EXTREMITY;  Surgeon: Jimenez Mchugh DPM;  Location: [facility] MAIN OR;  Service: Podiatry;  Laterality: Right;    INGUINAL HERNIA REPAIR Left 11/29/2024    Procedure: INGUINAL HERNIA REPAIR LAPAROSCOPIC WITH DAVINCI ROBOT;  Surgeon: Phong Lazo MD;  Location: [facility] MAIN OR;  Service: Robotics - DaVinci;  Laterality: Left;    INSERTION HEMODIALYSIS CATHETER N/A 3/11/2025    Procedure: HEMODIALYSIS CATHETER INSERTION;  Surgeon: Jeaneth Bonds MD;  Location: [facility] MAIN OR;  Service: Vascular;  Laterality: N/A;    QUADRICEPS TENDON REPAIR Left 5/14/2019    Procedure: Left QUADRICEPS TENDON REPAIR;  Surgeon: Camilo Hunter MD;  Location: [facility] OR;  Service: Orthopedics    THORACENTESIS Right 11/21/2016    THORACOSCOPY Right 5/8/2017    Procedure: BRONCHOSCOPY, RIGHT VAT,  TOTAL DECORTICATION RIGHT LUNG, PLEURAL BX, PLACEMENT SUBPLEURAL PAIN CAATHETERS X2;  Surgeon: Donald Orlando III, MD;  Location: [facility] OR;  Service:     TONSILECTOMY, ADENOIDECTOMY, BILATERAL MYRINGOTOMY AND TUBES      WOUND DEBRIDEMENT Right 3/13/2025    Procedure: DEBRIDEMENT FOOT, RIGHT;  Surgeon: Jimenez Mchugh DPM;  Location: [facility] OR;  Service: Podiatry;  Laterality: Right;        Social History:   Social History     Tobacco Use    Smoking status: Never     Passive  "exposure: Never    Smokeless tobacco: Never   Substance Use Topics    Alcohol use: Yes     Alcohol/week: 7.0 standard drinks of alcohol     Types: 7 Drinks containing 0.5 oz of alcohol per week     Comment: either one glass wine or one glass liquor per  day      Family History:  Family History   Problem Relation Age of Onset    Hypertension Father     Malgregory Hyperthermia Neg Hx           Allergies:  Allergies   Allergen Reactions    Ceclor [Cefaclor] Rash     Rash in his 50s; he tolerated piperacillin-tazobactam in March 2020     Scheduled Meds:  amiodarone, 200 mg, Daily  ampicillin-sulbactam, 3 g, Q24H  apixaban, 2.5 mg, BID  vitamin C, 250 mg, Daily  atorvastatin, 20 mg, Nightly  bumetanide, 2 mg, TID  cholecalciferol, 1,000 Units, Daily  clopidogrel, 75 mg, Daily  DAPTOmycin, 8 mg/kg (Adjusted), Once  Pharmacy to Dose daptomycin (CUBICIN), , Daily  epoetin vince/vince-epbx, 10,000 Units, Once per day on Monday Wednesday Friday  hydrALAZINE, 10 mg, TID  insulin glargine, 5 Units, Nightly  insulin lispro, 2-9 Units, 4x Daily AC & at Bedtime  insulin lispro, 6 Units, TID With Meals  Lidocaine, 1 patch, Q24H  linagliptin, 5 mg, Daily  melatonin, 5 mg, Nightly  Menthol-Zinc Oxide, 1 Application, Q12H  metoprolol succinate XL, 25 mg, Daily  pantoprazole, 40 mg, Q AM  sevelamer, 1,600 mg, TID With Meals  sodium chloride, 3 mL, Q12H  tamsulosin, 0.4 mg, Daily  zolpidem, 2.5 mg, Nightly            INTAKE AND OUTPUT:    Intake/Output Summary (Last 24 hours) at 3/18/2025 1237  Last data filed at 3/18/2025 0600  Gross per 24 hour   Intake --   Output 120 ml   Net -120 ml       ROS  Constitutional: Awake and alert, no fever. No nosebleeds  Abdomen           no abdominal pain   Cardiac              no chest pain  Pulmonary          no shortness of breath      /66 (BP Location: Right arm, Patient Position: Lying)   Pulse 76   Temp 97.2 °F (36.2 °C) (Oral)   Resp 20   Ht 185.4 cm (73\")   Wt 129 kg (283 lb 8.2 oz)   " "SpO2 95%   BMI 37.40 kg/m²   General appearance: No acute changes   Neck: Trachea midline; NECK, supple, no thyromegaly or lymphadenopathy   Lungs: Normal size and shape, normal breath sounds, equal distribution of air, no rales or rhonchi   CV: S1-S2 regular, no murmurs, no rub, no gallop   Abdomen: Soft, nontender; no masses , no abnormal abdominal sounds   Extremities: No deformity , normal color , no peripheral edema   Skin: Normal temperature, turgor and texture; no rash, ulcers            Cardiographics  Telemetry:     ECG:     Echocardiogram:     Lab Review   Results from last 7 days   Lab Units 03/18/25  0948 03/18/25  0531 03/16/25  0431   CK TOTAL U/L  --   --  23   HSTROP T ng/L 314* 293*  --      Results from last 7 days   Lab Units 03/18/25  0531   MAGNESIUM mg/dL 2.2     Results from last 7 days   Lab Units 03/18/25  0531   SODIUM mmol/L 132*   POTASSIUM mmol/L 3.8   BUN mg/dL 34*   CREATININE mg/dL 4.92*   CALCIUM mg/dL 8.1*     @[id number]@  Results from last 7 days   Lab Units 03/18/25  0531 03/17/25  0552 03/16/25  0431   WBC 10*3/mm3 10.37 12.90* 13.87*   HEMOGLOBIN g/dL 8.8* 8.9* 9.1*   HEMATOCRIT % 27.4* 27.8* 28.4*   PLATELETS 10*3/mm3 319 326 304             Assessment:  Chest pain  Coronary artery disease  Significantly elevated troponin    Plan:  79 years old with significant renal failure severe anemia had sharp left-sided chest pain at rest appears atypical    In the past patient has had abnormal stress test    Not a great candidate for invasive workup because of the renal failure and the age    Will repeat EKG and troponins in the morning    If chest pain continues a diagnostic heart catheterization with a high risk will be considered        )3/18/2025  MD EMBER Roberson/Transcription:   \"Dictated utilizing Dragon dictation\".     "

## 2025-03-18 NOTE — PLAN OF CARE
Problem: Fall Injury Risk  Goal: Absence of Fall and Fall-Related Injury  Outcome: Progressing     Problem: Skin Injury Risk Increased  Goal: Skin Health and Integrity  Outcome: Progressing   Goal Outcome Evaluation:  Plan of Care Reviewed With: patient        Progress: no change  Outcome Evaluation: Pt appeared to sleep fair, took ambien at hs, alert and oriented x 4, coop with care, compliant with repositioning q 2 hours, f/c draining, refused cpap, wound vac in place, to have HD today.

## 2025-03-18 NOTE — PLAN OF CARE
Goal Outcome Evaluation:  Plan of Care Reviewed With: patient           Outcome Evaluation: Patient with good participation in OT session this AM. Therapeutic intervention focused on improving patient dynamic sitting balance in preperation for ADLs and transfers through completion of unsupported sitting tasks and bend and reach activity. Patient with improved dynamic sitting balance, progressing from min A EOB to close SBA with increased reps of bend and reach task. patient continues to demonstrate overall weakness, requiring mod to max A with bed mobility and declining stands at time of session. Patient will benefit from continued skilled OT intervention to addrss deficits related to strength, tolerance, balance transfers and mobility. OT continues to recommend SNF at time of discharge as patient lives alone and will be unable to manage ADLs and mobility safely at time of discharge.    Anticipated Discharge Disposition (OT): skilled nursing facility

## 2025-03-18 NOTE — THERAPY TREATMENT NOTE
Patient Name: Rock Maciel Jr.  : 1945    MRN: [id number]                              Today's Date: 3/18/2025       Admit Date: 3/3/2025    Visit Dx:     ICD-10-CM ICD-9-CM   1. Traumatic rhabdomyolysis, initial encounter  T79.6XXA 958.6   2. Acute renal failure superimposed on chronic kidney disease, unspecified acute renal failure type, unspecified CKD stage  N17.9 584.9    N18.9 585.9   3. Sepsis, due to unspecified organism, unspecified whether acute organ dysfunction present  A41.9 038.9     995.91   4. Pneumonia due to infectious organism, unspecified laterality, unspecified part of lung  J18.9 486   5. Hyperglycemia  R73.9 790.29   6. Anemia, unspecified type  D64.9 285.9   7. Closed displaced fracture of acromial end of left clavicle, initial encounter  S42.032A 810.03   8. Decreased activities of daily living (ADL)  Z78.9 V49.89   9. Diabetic foot infection  E11.628 250.80    L08.9 686.9   10. Acute osteomyelitis of right foot  M86.071 730.07   11. Stage 3b chronic kidney disease  N18.32 585.3   12. Acute kidney injury  N17.9 584.9   13. ATN (acute tubular necrosis)  N17.0 584.5   14. End stage renal disease  N18.6 585.6     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    Chronic venous insufficiency    Nonrheumatic aortic valve stenosis    Carotid stenosis, asymptomatic    Bradycardia    Hyperlipidemia    Bilateral carotid artery disease    Abnormal cardiovascular stress test    H/O aortic valve replacement with porcine valve    Charcot-Davida-Tooth disease-like deformity of foot    Amputated toe of right  foot    Fall    Non-traumatic rhabdomyolysis    S/P CABG x 2    CKD (chronic kidney disease) stage 3, GFR 30-59 ml/min    Rupture of left quadriceps tendon    Constipation    KILLIAN (acute kidney injury)    Wound of right leg    Anemia    Chronic diastolic (congestive) heart failure    Amputated toe of left foot    Gas gangrene    Cellulitis of left lower limb    Acquired absence of left foot    Bilateral lower extremity edema    Stage 3b chronic kidney disease    History of pneumonia    Atelectasis of both lungs    Abnormal pleural fluid    Pleural thickening    Atrial fibrillation, persistent    Chronic anticoagulation    Persistent atrial fibrillation    Bladder wall thickening    Hematuria    CKD (chronic kidney disease) stage 4, GFR 15-29 ml/min    Hypoxemia    Nocturnal hypoxemia    Status post left inguinal hernia repair, follow-up exam    Weakness of both lower extremities    Unsteady gait when walking    Wound, open, arm, forearm, right, subsequent encounter    Traumatic rhabdomyolysis    Closed displaced fracture of left clavicle    Pneumonia due to infectious organism    ATN (acute tubular necrosis)    Contusion of left knee    Acute osteomyelitis of right foot    Diabetic foot infection    MRSA (methicillin resistant Staphylococcus aureus) infection    Acute kidney injury     Past Medical History:   Diagnosis Date    Allergic rhinitis     Bronchitis     Coronary artery disease     Diabetes mellitus 2001    DM (diabetes mellitus)     Encounter for annual health examination 05/06/2014    Annual Health Assessment    Gout     Hiatal hernia     History of MRSA infection     RIGHT FOOT 2010    Hyperlipidemia     Hypertension     Murmur     Pneumothorax on right     Sleep apnea     pt wears CPAP at night    Wellness examination 06/24/2015    Annual Wellness Visit     Past Surgical History:   Procedure Laterality Date    ARTERY SURGERY Bilateral     carotid    BONE EXCISION LEG Right 3/10/2025    Procedure: BONE  EXCISION LOWER EXTERMITY, RIGHT;  Surgeon: Jimenez Mchugh DPM;  Location: [facility] MAIN OR;  Service: Podiatry;  Laterality: Right;    CARDIAC CATHETERIZATION N/A 7/20/2018    Procedure: Left Heart Cath;  Surgeon: Jo Toney MD;  Location: [facility] CATH INVASIVE LOCATION;  Service: Cardiovascular    CARDIAC CATHETERIZATION N/A 7/20/2018    Procedure: Coronary angiography;  Surgeon: Jo Toney MD;  Location: [facility] CATH INVASIVE LOCATION;  Service: Cardiovascular    CAROTID ENDARTERECTOMY Bilateral     COLONOSCOPY  2008    CORONARY ARTERY BYPASS GRAFT WITH AORTIC VALVE REPAIR/REPLACEMENT N/A 7/23/2018    Procedure: INTRAOPERATIVE ALEX, MIDLINE STERNOTOMY, CORONARY ARTERY BYPASS GRAFTING X 3 USING ENDOSCOPICALLY HARVESTED LEFT GREATER SAPHENOUS VEIN,  AORTIC VALVE REPLACEMENT USING 25MM LOPEZ II ULTRA PORCINE VALVE, PRP;  Surgeon: Phong Posey MD;  Location: [facility] MAIN OR;  Service: Cardiothoracic    FOOT SURGERY Right 2010    5th digit removal    FOOT SURGERY Left 2011    1 digit removed    INCISION AND DRAINAGE LEG Right 3/28/2020    Procedure: DEBRIDMENT OF RIGHT CALF;  Surgeon: Ross Pineda MD;  Location: [facility] MAIN OR;  Service: Vascular;  Laterality: Right;    INCISION AND DRAINAGE LEG Right 3/10/2025    Procedure: INCISION AND DRAINAGE LOWER EXTREMITY;  Surgeon: Jimenez Mchugh DPM;  Location: [facility] MAIN OR;  Service: Podiatry;  Laterality: Right;    INGUINAL HERNIA REPAIR Left 11/29/2024    Procedure: INGUINAL HERNIA REPAIR LAPAROSCOPIC WITH DAVINCI ROBOT;  Surgeon: Phong Lazo MD;  Location: [facility] MAIN OR;  Service: Robotics - DaVinci;  Laterality: Left;    INSERTION HEMODIALYSIS CATHETER N/A 3/11/2025    Procedure: HEMODIALYSIS CATHETER INSERTION;  Surgeon: Jeaneth Bonds MD;  Location: [facility] MAIN OR;  Service: Vascular;  Laterality: N/A;    QUADRICEPS TENDON REPAIR Left 5/14/2019    Procedure: Left QUADRICEPS TENDON REPAIR;  Surgeon: Dale  Camilo REAL MD;  Location: [facility] OR;  Service: Orthopedics    THORACENTESIS Right 11/21/2016    THORACOSCOPY Right 5/8/2017    Procedure: BRONCHOSCOPY, RIGHT VAT,  TOTAL DECORTICATION RIGHT LUNG, PLEURAL BX, PLACEMENT SUBPLEURAL PAIN CAATHETERS X2;  Surgeon: Donald Orlando III, MD;  Location: [facility] OR;  Service:     TONSILECTOMY, ADENOIDECTOMY, BILATERAL MYRINGOTOMY AND TUBES      WOUND DEBRIDEMENT Right 3/13/2025    Procedure: DEBRIDEMENT FOOT, RIGHT;  Surgeon: Jimenez Mchugh DPM;  Location: [facility];  Service: Podiatry;  Laterality: Right;      General Information       Row Name 03/18/25 1010          OT Time and Intention    Document Type therapy note (daily note)  -ES     Mode of Treatment individual therapy;occupational therapy  -ES     Patient Effort adequate  -ES       Row Name 03/18/25 1010          General Information    Existing Precautions/Restrictions fall;oxygen therapy device and L/min  -ES     Barriers to Rehab medically complex;previous functional deficit  -ES       Row Name 03/18/25 1010          Cognition    Orientation Status (Cognition) oriented x 3  patient pleasant and cooperative, agreeable to therapy participation  -ES       Row Name 03/18/25 1010          Safety Issues/Impairments Affecting Functional Mobility    Impairments Affecting Function (Mobility) endurance/activity tolerance;shortness of breath;balance;range of motion (ROM);strength  -ES               User Key  (r) = Recorded By, (t) = Taken By, (c) = Cosigned By      Initials Name Provider Type    ES Lillie Isidro, OTR/L, CSRS Occupational Therapist                     Mobility/ADL's       Row Name 03/18/25 1011          Bed Mobility    Bed Mobility supine-sit;sit-supine  -ES     Supine-Sit [place] (Bed Mobility) moderate assist (50% patient effort);1 person assist  -ES     Sit-Supine [place] (Bed Mobility) maximum assist (25% patient effort);1 person assist  -ES     Bed Mobility, Safety Issues  decreased use of arms for pushing/pulling;decreased use of legs for bridging/pushing  -ES     Comment, (Bed Mobility) cues for sequencing and increased time with bed mobility. Patient is able to navigate LEs to edge of bed with supine to sit: assist provided with trunk elevation. On return to supine, patient requires max A with trunk and LEs back to bed  -ES       Row Name 03/18/25 1011          Activities of Daily Living    BADL Assessment/Intervention grooming  -ES       Row Name 03/18/25 1011          Mobility    Extremity Weight-bearing Status left upper extremity;right lower extremity  -ES     Left Upper Extremity (Weight-bearing Status) non weight-bearing (NWB)  -ES     Right Lower Extremity (Weight-bearing Status) other (see comments)  heel touch WB RLE  -ES       Row Name 03/18/25 1011          Grooming Assessment/Training    [place] Level (Grooming) grooming skills;hair care, combing/brushing;oral care regimen;wash face, hands;set up;standby assist  -ES     Position (Grooming) edge of bed sitting  -ES     Comment, (Grooming) SBA edge of bed, patient is able to maintain sitting balance. unable to progress to ADLs in stance at this time  -ES               User Key  (r) = Recorded By, (t) = Taken By, (c) = Cosigned By      Initials Name Provider Type    ES Lillie Isidro, KHARIR/L, CSRS Occupational Therapist                   Obj/Interventions       Row Name 03/18/25 1014          Shoulder (Therapeutic Exercise)    Shoulder (Therapeutic Exercise) AROM (active range of motion)  -ES     Shoulder AROM (Therapeutic Exercise) flexion;2 sets;10 repetitions;right  -ES       Row Name 03/18/25 1014          Elbow/Forearm (Therapeutic Exercise)    Elbow/Forearm (Therapeutic Exercise) AROM (active range of motion)  -ES     Elbow/Forearm AROM (Therapeutic Exercise) right;flexion;2 sets;10 repetitions  -ES       Row Name 03/18/25 1014          Motor Skills    Therapeutic Exercise shoulder;elbow/forearm  -ES       Row  Name 03/18/25 1014          Balance    Balance Assessment sitting dynamic balance  -ES     Comment, Balance SBA with static sitting EOB progressing to min A with dynamic sitting task. OT instructed patient to complete bend and reach task seated EOB focused on improving dynamic sitting balance. Patient completes bend and reach task with RUE 5 reps x 4 trials with rest breaks in between. Dynamic sitting balance progressing from min A to close SBA with increased reps.  -ES               User Key  (r) = Recorded By, (t) = Taken By, (c) = Cosigned By      Initials Name Provider Type    Lillie Connors, OTR/L, CSRS Occupational Therapist                   Goals/Plan    No documentation.                  Clinical Impression       Row Name 03/18/25 1029          Plan of Care Review    Plan of Care Reviewed With patient  -ES     Outcome Evaluation Patient with good participation in OT session this AM. Therapeutic intervention focused on improving patient dynamic sitting balance in preperation for ADLs and transfers through completion of unsupported sitting tasks and bend and reach activity. Patient with improved dynamic sitting balance, progressing from min A EOB to close SBA with increased reps of bend and reach task. patient continues to demonstrate overall weakness, requiring mod to max A with bed mobility and declining stands at time of session. Patient will benefit from continued skilled OT intervention to addrss deficits related to strength, tolerance, balance transfers and mobility. OT continues to recommend SNF at time of discharge as patient lives alone and will be unable to manage ADLs and mobility safely at time of discharge.  -ES       Row Name 03/18/25 1029          Therapy Plan Review/Discharge Plan (OT)    Anticipated Discharge Disposition (OT) skilled nursing facility  -ES       Row Name 03/18/25 1029          Positioning and Restraints    Pre-Treatment Position in bed  -ES     Post Treatment Position bed   -ES     In Bed fowlers;call light within reach;encouraged to call for assist;exit alarm on  -ES               User Key  (r) = Recorded By, (t) = Taken By, (c) = Cosigned By      Initials Name Provider Type    Lillie Connors OTR/L, CSRS Occupational Therapist                   Outcome Measures       Row Name 03/18/25 1031          How much help from another is currently needed...    Putting on and taking off regular lower body clothing? 2  -ES     Bathing (including washing, rinsing, and drying) 2  -ES     Toileting (which includes using toilet bed pan or urinal) 1  -ES     Putting on and taking off regular upper body clothing 2  -ES     Taking care of personal grooming (such as brushing teeth) 3  -ES     Eating meals 4  -ES     AM-PAC 6 Clicks Score (OT) 14  -ES       Row Name 03/18/25 1031          Functional Assessment    Outcome Measure Options AM-PAC 6 Clicks Daily Activity (OT)  -ES               User Key  (r) = Recorded By, (t) = Taken By, (c) = Cosigned By      Initials Name Provider Type    Lillie Connors OTR/L, LEANDER Occupational Therapist                      OT Recommendation and Plan  Planned Therapy Interventions (OT): activity tolerance training, BADL retraining, functional balance retraining, patient/caregiver education/training, ROM/therapeutic exercise, strengthening exercise, transfer/mobility retraining  Therapy Frequency (OT): 5 times/wk  Plan of Care Review  Plan of Care Reviewed With: patient  Outcome Evaluation: Patient with good participation in OT session this AM. Therapeutic intervention focused on improving patient dynamic sitting balance in preperation for ADLs and transfers through completion of unsupported sitting tasks and bend and reach activity. Patient with improved dynamic sitting balance, progressing from min A EOB to close SBA with increased reps of bend and reach task. patient continues to demonstrate overall weakness, requiring mod to max A with bed mobility and declining  stands at time of session. Patient will benefit from continued skilled OT intervention to addrss deficits related to strength, tolerance, balance transfers and mobility. OT continues to recommend SNF at time of discharge as patient lives alone and will be unable to manage ADLs and mobility safely at time of discharge.     Time Calculation:   Evaluation Complexity (OT)  Review Occupational Profile/Medical/Therapy History Complexity: expanded/moderate complexity  Assessment, Occupational Performance/Identification of Deficit Complexity: 3-5 performance deficits  Clinical Decision Making Complexity (OT): detailed assessment/moderate complexity  Overall Complexity of Evaluation (OT): moderate complexity     Time Calculation- OT       Row Name 03/18/25 1032             Time Calculation- OT    OT Start Time 0938  -ES      OT Stop Time 1003  -ES      OT Time Calculation (min) 25 min  -ES      Total Timed Code Minutes- OT 25 minute(s)  -ES      OT Received On 03/18/25  -ES      OT - Next Appointment 03/19/25  -ES         Timed Charges    84755 - OT Therapeutic Exercise Minutes 15  -ES      80894 - OT Self Care/Mgmt Minutes 10  -ES         Total Minutes    Timed Charges Total Minutes 25  -ES       Total Minutes 25  -ES                User Key  (r) = Recorded By, (t) = Taken By, (c) = Cosigned By      Initials Name Provider Type    ES Lillie Isidro, OTR/L, CSRS Occupational Therapist                  Therapy Charges for Today       Code Description Service Date Service Provider Modifiers Qty    91838078308 HC OT THER PROC EA 15 MIN 3/18/2025 Lillie Isidro, OTR/L, CSRS GO 1    94751750289 HC OT SELF CARE/MGMT/TRAIN EA 15 MIN 3/18/2025 Lillie Isidro, OTR/L, CSRS GO 1                 Lillie Isidro OTR/L, CSRS  3/18/2025

## 2025-03-18 NOTE — PROGRESS NOTES
"      FOLLOW UP NOTE      Name: Rock Maciel Jr. ADMIT: 3/3/2025   : 1945  PCP: Denise Dickson APRN    MRN: [id number] LOS: 15 days   AGE/SEX: 79 y.o. male  ROOM: [facility]     Date of Service: 3/18/2025                           CHIEF COMPLIANTS / REASON FOR FOLLOW UP                Subjective:      Dyspnea improved today.  Tolerated 3 L UF with HD yesterday.  Still having some slight orthopnea.  BP improved marginally.  Minimum urine output noted.  Review of Systems:     Negative except as above       OBJECTIVE                                                                        Exam:  /66 (BP Location: Right arm, Patient Position: Lying)   Pulse 76   Temp 97.2 °F (36.2 °C) (Oral)   Resp 20   Ht 185.4 cm (73\")   Wt 129 kg (283 lb 8.2 oz)   SpO2 95%   BMI 37.40 kg/m²   Intake/Output last 3 shifts:  I/O last 3 completed shifts:  In: -   Out: 3120 [Urine:120]  Intake/Output this shift:  No intake/output data recorded.    General Appearance: Chronically ill, pale appearing, comfortable and conversing on 2 L nasal cannula.  Lungs:   Lungs diminished  Heart: RRR  Abdomen:  Soft, nontender  Extremities: Edema appears improved.  Neurologic:   Alert and oriented  [facility] TDC      Scheduled Meds:amiodarone, 200 mg, Oral, Daily  ampicillin-sulbactam, 3 g, Intravenous, Q24H  apixaban, 2.5 mg, Oral, BID  vitamin C, 250 mg, Oral, Daily  atorvastatin, 20 mg, Oral, Nightly  bumetanide, 2 mg, Oral, TID  cholecalciferol, 1,000 Units, Oral, Daily  clopidogrel, 75 mg, Oral, Daily  DAPTOmycin, 8 mg/kg (Adjusted), Intravenous, Once  Pharmacy to Dose daptomycin (CUBICIN), , Not Applicable, Daily  epoetin vince/vince-epbx, 10,000 Units, Intravenous, Once per day on   hydrALAZINE, 10 mg, Oral, TID  insulin glargine, 5 Units, Subcutaneous, Nightly  insulin lispro, 2-9 Units, Subcutaneous, 4x Daily AC & at Bedtime  insulin lispro, 6 Units, Subcutaneous, TID With Meals  Lidocaine, 1 patch, " Transdermal, Q24H  linagliptin, 5 mg, Oral, Daily  melatonin, 5 mg, Oral, Nightly  Menthol-Zinc Oxide, 1 Application, Topical, Q12H  metoprolol succinate XL, 25 mg, Oral, Daily  pantoprazole, 40 mg, Oral, Q AM  sevelamer, 1,600 mg, Oral, TID With Meals  sodium chloride, 3 mL, Intravenous, Q12H  tamsulosin, 0.4 mg, Oral, Daily  zolpidem, 2.5 mg, Oral, Nightly      Continuous Infusions:Pharmacy Consult - Pharmacy to dose,         PRN Meds:  artificial tears    senna-docusate sodium **AND** polyethylene glycol **AND** bisacodyl **AND** bisacodyl    cadexomer iodine    dextrose    dextrose    famotidine    glucagon (human recombinant)    heparin (porcine)    HYDROcodone-acetaminophen    nitroglycerin    ondansetron ODT **OR** ondansetron    Pharmacy Consult - Pharmacy to dose    sodium chloride    sodium chloride         Data Review:                                                                           Labs reviewed        Imaging:                                                                           Radiology reviewed           ASSESSMENT:                                                                                Traumatic rhabdomyolysis    Arteriosclerosis of coronary artery    Essential hypertension    Type 2 diabetes mellitus with circulatory disorder, without long-term current use of insulin    Hyperlipidemia    Charcot-Davida-Tooth disease-like deformity of foot    Fall    S/P CABG x 2    KILLIAN (acute kidney injury)    Chronic diastolic (congestive) heart failure    Stage 3b chronic kidney disease    Chronic anticoagulation    Persistent atrial fibrillation    Closed displaced fracture of left clavicle    Pneumonia due to infectious organism    ATN (acute tubular necrosis)    Contusion of left knee    Acute osteomyelitis of right foot    Diabetic foot infection    MRSA (methicillin resistant Staphylococcus aureus) infection    Acute kidney injury         KILLIAN: likely ATN related to rhabdomyolysis, prerenal  insult related to diuretics etc.  Needing RRT since 3/11/2025. Reason for GFR decline likely multifactorial, some ATN, ongoing osteomyelitis etc.   RIJ TDC placed 3/11/2025.  First HD 3/12.    Fluid overload/severe hypotension with pulmonary edema  Lower extremity osteomyelitis/cellulitis  CKD stage III-IV: Baseline creatinine  1.8-2.6 mg/dL with baseline GFR around 25 mL/min  Acute urinary retention requiring Johnson catheter placement.  Clavicle fracture  A-fib with controlled rates..  Severe anemia in CKD: Stable.          PLAN:                                                                            HD today for further volume control/hypertension: UF goal 3 L.  Continue HD MWF thereafter.  Podiatry and ID managing foot osteomyelitis: Antibiotics dosed for CrCl <5 mL/min.  Continue EPO with HD and transfuse as necessary.  Hemoglobin stable.  Continue Renvela with meals.  Placement pending.  Will require outpatient HD arranged.    Tiesha Mccrary MD  [facility]  3/18/2025  11:51 EDT

## 2025-03-18 NOTE — PROGRESS NOTES
Infectious Diseases Progress Note    Nicolasa Denny MD     [facility]  Los: 15 days  Patient Identification:  Name: Rock Maciel Jr.  Age: 79 y.o.  Sex: male  :  1945  MRN: [id number]         Primary Care Physician: Denise Dickson, ZEINA        Subjective: Had chest pain last night with some elevation in troponin.  Interval History: See consultation note.    Objective:    Scheduled Meds:amiodarone, 200 mg, Oral, Daily  ampicillin-sulbactam, 3 g, Intravenous, Q24H  apixaban, 2.5 mg, Oral, BID  vitamin C, 250 mg, Oral, Daily  atorvastatin, 20 mg, Oral, Nightly  bumetanide, 2 mg, Oral, TID  cholecalciferol, 1,000 Units, Oral, Daily  clopidogrel, 75 mg, Oral, Daily  Pharmacy to Dose daptomycin (CUBICIN), , Not Applicable, Daily  epoetin vince/vince-epbx, 10,000 Units, Intravenous, Once per day on   hydrALAZINE, 10 mg, Oral, TID  insulin glargine, 5 Units, Subcutaneous, Nightly  insulin lispro, 2-9 Units, Subcutaneous, 4x Daily AC & at Bedtime  insulin lispro, 6 Units, Subcutaneous, TID With Meals  Lidocaine, 1 patch, Transdermal, Q24H  linagliptin, 5 mg, Oral, Daily  melatonin, 5 mg, Oral, Nightly  Menthol-Zinc Oxide, 1 Application, Topical, Q12H  metoprolol succinate XL, 25 mg, Oral, Daily  pantoprazole, 40 mg, Oral, Q AM  sevelamer, 1,600 mg, Oral, TID With Meals  sodium chloride, 3 mL, Intravenous, Q12H  tamsulosin, 0.4 mg, Oral, Daily  zolpidem, 2.5 mg, Oral, Nightly      Continuous Infusions:Pharmacy Consult - Pharmacy to dose,         Vital signs in last 24 hours:  Temp:  [97.2 °F (36.2 °C)-98 °F (36.7 °C)] 97.2 °F (36.2 °C)  Heart Rate:  [69-84] 76  Resp:  [18-32] 20  BP: (143-179)/(50-72) 155/66    Intake/Output:    Intake/Output Summary (Last 24 hours) at 3/18/2025 1000  Last data filed at 3/18/2025 0600  Gross per 24 hour   Intake --   Output 3120 ml   Net -3120 ml         Exam:  /66 (BP Location: Right arm, Patient Position: Lying)   Pulse 76   Temp  "97.2 °F (36.2 °C) (Oral)   Resp 20   Ht 185.4 cm (73\")   Wt 129 kg (283 lb 8.2 oz)   SpO2 95%   BMI 37.40 kg/m²   Patient is examined using the personal protective equipment as per guidelines from infection control for this particular patient as enacted.  Hand washing was performed before and after patient interaction.  General Appearance:  Lethargic but interactive                          Head:  Left posterior occipital scalp area laceration dressed.                           Eyes:    PERRL, pale conjunctiva                         Throat:   Lips, tongue, gums normal; oral mucosa pink and moist                           Neck:   Supple, symmetrical, trachea midline, no JVD                         Lungs:    Clear to auscultation bilaterally, respirations unlabored                 Chest Wall:    No tenderness or deformity left clavicular tenderness noted, right IJ tunneled catheter in place.                          Heart:  S1-S2 irregular                  Abdomen:   Soft nontender                 Extremities: Right foot dressed after incision and drainage of multiple compartments and partial resection of the fourth metatarsal.                  Neurologic: Alert and oriented x 3       Data Review:    I reviewed the patient's new clinical results.  Results from last 7 days   Lab Units 03/18/25 0531 03/17/25  0552 03/16/25  0431 03/15/25  0439 03/14/25  0518 03/13/25  0452 03/12/25  0727   WBC 10*3/mm3 10.37 12.90* 13.87* 10.97* 9.21 8.65 9.14   HEMOGLOBIN g/dL 8.8* 8.9* 9.1* 9.0* 7.3* 7.7* 6.8*   PLATELETS 10*3/mm3 319 326 304 293 268 292 274     Results from last 7 days   Lab Units 03/18/25  0531 03/17/25  0552 03/16/25  0431 03/15/25  0439 03/14/25  0518 03/13/25  0452 03/12/25  0727   SODIUM mmol/L 132* 136 135* 134* 138 139 134*   POTASSIUM mmol/L 3.8 4.6 4.3 4.1 4.1 4.5 5.2   CHLORIDE mmol/L 98 100 101 100 103 103 99   CO2 mmol/L 20.9* 20.1* 21.0* 22.4 21.0* 20.0* 20.7*   BUN mg/dL 34* 48* 41* 33* 64* 84* " 111*   CREATININE mg/dL 4.92* 6.32* 5.42* 3.66* 5.76* 6.59* 7.39*   CALCIUM mg/dL 8.1* 8.2* 7.8* 8.0* 7.6* 7.8* 8.2*   GLUCOSE mg/dL 104* 104* 118* 145* 106* 130* 152*     Microbiology Results (last 10 days)       Procedure Component Value - Date/Time    Anaerobic Culture - Wound, Foot, Right [[id number]]  (Normal) Collected: 03/10/25 0909    Lab Status: Final result Specimen: Wound from Foot, Right Updated: 03/15/25 0658     Anaerobic Culture No anaerobes isolated at 5 days    Wound Culture - Wound, Foot, Right [[id number]]  (Abnormal)  (Susceptibility) Collected: 03/10/25 0909    Lab Status: Final result Specimen: Wound from Foot, Right Updated: 03/13/25 0643     Wound Culture Scant growth (1+) Staphylococcus aureus, MRSA     Comment:   Methicillin resistant Staphylococcus aureus, Patient may be an isolation risk.        Gram Stain Rare (1+) Gram positive cocci      Rare (1+) WBCs seen    Narrative:            Susceptibility        Staphylococcus aureus, MRSA      ABBIE      Clindamycin Susceptible      Erythromycin Resistant      Oxacillin Resistant      Rifampin Susceptible      Tetracycline Susceptible      Trimethoprim + Sulfamethoxazole Resistant      Vancomycin Susceptible                                     Assessment:    Traumatic rhabdomyolysis    Arteriosclerosis of coronary artery    Essential hypertension    Type 2 diabetes mellitus with circulatory disorder, without long-term current use of insulin    Hyperlipidemia    Charcot-Davida-Tooth disease-like deformity of foot    Fall    S/P CABG x 2    KILLIAN (acute kidney injury)    Chronic diastolic (congestive) heart failure    Stage 3b chronic kidney disease    Chronic anticoagulation    Persistent atrial fibrillation    Closed displaced fracture of left clavicle    Pneumonia due to infectious organism    ATN (acute tubular necrosis)    Contusion of left knee    Acute osteomyelitis of right foot    Diabetic foot infection    MRSA (methicillin resistant  Staphylococcus aureus) infection    Acute kidney injury  1-probable systemic illness due to  2-evolving ongoing infection with abscess of the right foot resulting in generalized weakness and  3-subsequent fall and fracture of left clavicle and left knee discomfort  4-diabetes mellitus  5-history of gout  6-sleep apnea  7-obesity  8-hypertension  9-severe anemia  10-acute on chronic renal failure - ESRD to start dialysis -3/12/2025  11-prior multiple diabetic foot infections and surgeries.  12-status post right IJ tunneled catheter with small post op pneumothorax     Recommendations/Discussions:  See my discussion and recommendation on 3/7/2025 and 3/15/2025.  Pathology report from the operative sample taken on 3/10/2025 reveals that resected bone margin is free of acute infection and inflammation.  In this scenario patient would need 2 weeks of antibiotic therapy from 3/10/2025.  Continue with IV antibiotics while he is here and when he is considered ready to be discharged prior to 3/24/2025 his antibiotic regimen can be switched to Augmentin and linezolid to complete the treatment after last dose on 3/24/2025.  Side effects of antibiotic therapy discussed with the patient and he understands the risks but also understands the need for antibiotic therapy and wants to proceed.  Nicolasa Denny MD  3/18/2025  10:00 EDT    Parts of this note may be an electronic transcription/translation of spoken language to printed text using the Dragon dictation system.

## 2025-03-18 NOTE — PROGRESS NOTES
Name: Rock Maciel Jr. ADMIT: 3/3/2025   : 1945  PCP: Denise Dickson APRN    MRN: [id number] LOS: 15 days   AGE/SEX: 79 y.o. male  ROOM: [facility]     Subjective   Subjective   Patient developed of chest pain overnight, developed this morning.  Troponin obtained, came back elevated at 293 and 314 respectively.  Cardiology consulted.    ROS  As above     Objective   Objective   Vital Signs  Temp:  [97.2 °F (36.2 °C)-97.7 °F (36.5 °C)] 97.2 °F (36.2 °C)  Heart Rate:  [69-84] 76  Resp:  [18-32] 20  BP: (143-179)/(50-72) 155/66  SpO2:  [94 %-99 %] 95 %  on  Flow (L/min) (Oxygen Therapy):  [1.5-2] 2;   Device (Oxygen Therapy): nasal cannula  Body mass index is 37.4 kg/m².    Physical exam  General: Alert, laying in bed, not in distress, chronically ill-appearing  HEENT: Normocephalic, atraumatic,   CV: Regular rate and rhythm,.  Right upper chest HD catheter in place  Lungs: CTA anteriorly, no wheezing,  Abdomen: Soft, nontender, nondistended  Extremities: Bilateral lower extremity edema, status post left foot amputation.  Right foot dressing in place.      Results Review:       I reviewed the patient's new clinical results.  Results from last 7 days   Lab Units 03/18/25  0531 03/17/25  0552 03/16/25  0431 03/15/25  0439   WBC 10*3/mm3 10.37 12.90* 13.87* 10.97*   HEMOGLOBIN g/dL 8.8* 8.9* 9.1* 9.0*   PLATELETS 10*3/mm3 319 326 304 293     Results from last 7 days   Lab Units 03/18/25  0531 03/17/25  0552 03/16/25  0431 03/15/25  0439   SODIUM mmol/L 132* 136 135* 134*   POTASSIUM mmol/L 3.8 4.6 4.3 4.1   CHLORIDE mmol/L 98 100 101 100   CO2 mmol/L 20.9* 20.1* 21.0* 22.4   BUN mg/dL 34* 48* 41* 33*   CREATININE mg/dL 4.92* 6.32* 5.42* 3.66*   GLUCOSE mg/dL 104* 104* 118* 145*   Estimated Creatinine Clearance: 16.9 mL/min (A) (by C-G formula based on SCr of 4.92 mg/dL (H)).  Results from last 7 days   Lab Units 25  0518   ALBUMIN g/dL 2.4*     Results from last 7 days   Lab Units 25  0531  03/17/25  0552 03/16/25  0431 03/15/25  0439 03/14/25  0518   CALCIUM mg/dL 8.1* 8.2* 7.8* 8.0* 7.6*   ALBUMIN g/dL  --   --   --   --  2.4*   MAGNESIUM mg/dL 2.2 2.4 2.3  --   --    PHOSPHORUS mg/dL 5.1* 7.3* 5.7*  --  6.5*       Glucose   Date/Time Value Ref Range Status   03/18/2025 1103 98 70 - 130 mg/dL Final   03/18/2025 0613 107 70 - 130 mg/dL Final   03/17/2025 2040 138 (H) 70 - 130 mg/dL Final   03/17/2025 1623 153 (H) 70 - 130 mg/dL Final   03/17/2025 1249 120 70 - 130 mg/dL Final   03/17/2025 0619 108 70 - 130 mg/dL Final   03/16/2025 2121 120 70 - 130 mg/dL Final       amiodarone, 200 mg, Oral, Daily  ampicillin-sulbactam, 3 g, Intravenous, Q24H  apixaban, 2.5 mg, Oral, BID  vitamin C, 250 mg, Oral, Daily  atorvastatin, 20 mg, Oral, Nightly  bumetanide, 2 mg, Oral, TID  cholecalciferol, 1,000 Units, Oral, Daily  clopidogrel, 75 mg, Oral, Daily  DAPTOmycin, 8 mg/kg (Adjusted), Intravenous, Once  Pharmacy to Dose daptomycin (CUBICIN), , Not Applicable, Daily  epoetin vince/vince-epbx, 10,000 Units, Intravenous, Once per day on Monday Wednesday Friday  hydrALAZINE, 10 mg, Oral, TID  insulin glargine, 5 Units, Subcutaneous, Nightly  insulin lispro, 2-9 Units, Subcutaneous, 4x Daily AC & at Bedtime  insulin lispro, 6 Units, Subcutaneous, TID With Meals  Lidocaine, 1 patch, Transdermal, Q24H  linagliptin, 5 mg, Oral, Daily  melatonin, 5 mg, Oral, Nightly  Menthol-Zinc Oxide, 1 Application, Topical, Q12H  metoprolol succinate XL, 25 mg, Oral, Daily  pantoprazole, 40 mg, Oral, Q AM  sevelamer, 1,600 mg, Oral, TID With Meals  sodium chloride, 3 mL, Intravenous, Q12H  tamsulosin, 0.4 mg, Oral, Daily  zolpidem, 2.5 mg, Oral, Nightly      Pharmacy Consult - Pharmacy to dose,     Diet: Renal; Low Potassium, Low Phosphorus, Low Sodium (2-3g); Fluid Consistency: Thin (IDDSI 0)       Assessment/Plan     Active Hospital Problems    Diagnosis  POA    **Traumatic rhabdomyolysis [T79.6XXA]  Yes    MRSA (methicillin resistant  Staphylococcus aureus) infection [A49.02]  Yes    Contusion of left knee [S80.02XA]  Yes    Acute osteomyelitis of right foot [M86.071]  Yes    Diabetic foot infection [E11.628, L08.9]  Yes    ATN (acute tubular necrosis) [N17.0]  Yes    Closed displaced fracture of left clavicle [S42.002A]  Yes    Pneumonia due to infectious organism [J18.9]  Yes    Acute kidney injury [N17.9]  Unknown    Persistent atrial fibrillation [I48.19]  Yes    Chronic anticoagulation [Z79.01]  Not Applicable    Stage 3b chronic kidney disease [N18.32]  Yes    Chronic diastolic (congestive) heart failure [I50.32]  Yes    KILLIAN (acute kidney injury) [N17.9]  Yes    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     79 year old man who presented following a mechanical fall leading to rhabdomyolysis, left knee contusion with left knee effusion and meniscus tear, and a clavicular fracture. He was also found to have KILLIAN on CKD 4, pneumonia, osteomyelitis and abscess/cellulitis of the right foot.     Traumatic rhabdomyolysis-resolved with IVF.     KILLIAN on CKD 4-prerenal due to ATN. Now s/p tunneled dialysis cathter placement and is now on HD    Post procedural right apical pneumothorax-tiny. Gone on repeat chest xray    Pneumonia-completed a course of zosyn while hospitalized    Osteomyelitis and abscess of the right foot-s/p I&D and partial 4th metatarsal excision on 3/10/25 by Dr. Mchugh and then additional debridement of nonviable necrotic tissue on 3/13/25. Currently on daptomycin and unasyn per ID with final antibiotic plans pending    Acute urinary retention/BPH-abdi  catheter placed. Urology evaluated and  recommended flomax and a voiding trial on the morning of discharge.    Left clavicular fracture-orthopedic surgery evaluated and recommended a sling as tolerated, ice, pain control, and to avoid forward flexion for now    Left knee contusion with left knee effusion and meniscus tear-orthopedic surgery recommends a knee brace/immobilizer for at least 3 weeks and then begin range of motion exercises with PT    Anemia of chronic disease/CKD  -hgb down to 7.3 03/14/2025.  Status post 1 unit of PRBC transfused.  Hemoglobin 9.0    Chronic afib-amiodarone. Eliquis is held for procedures, resumed 02/15    Chest pain  Troponin elevated at  294 and 314 respectively  Cardiology consulted.  Plan for repeat EKG and troponin in the morning.    Chronic diastolic heart failure  -bumex, though not really making much urine at this point. Volume is largely being managed with HD  -    Coronary artery disease-plavix is held acutely for procedures, will restart as this has been okayed with Dr. Mchugh. Statin/bb. Cardiology followed and signed off 3/13 with plans to follow up 4/8 in office.    Type 2 diabetes-A1c is 6. Glucose is at goal acutely on current regimen    Essential hypertension-adequate control acutely    Hyperlipidemia-statin     Insomnia  -Continue 5 mg nightly  -Will prescribe low-dose Ambien    Eliquis (home med) for DVT prophylaxis.   Full code.  Discussed with patient,  Discussed wishes to be  Anticipate discharge to SNU, percent pending.  Likely stable to be discharged tomorrow if no further episodes of chest pain and cleared by cardiology.    Follow-up.        ZEINA Fraga  [facility]  03/18/25  16:15 EDT

## 2025-03-18 NOTE — NURSING NOTE
HD completed without complications.  3 L removed.     Pre HD   /78  HR 74  Weight 122.1 kg    Post HD   /81  HR 75  Weight 119.0 kg  BVP 75.6     Dressing change w/ DJc Boss

## 2025-03-18 NOTE — PROGRESS NOTES
"[facility]: Daptomycin Monitoring Note    Rock Maciel Jr. is a 79 y.o. male who is on day 7 of pharmacy to dose vancomycin for Bone and/or Joint Infection.    Previous Daptomycin Dose:   intermittent dosing  Updated Cultures and Sensitivities:   3/10 wound Cx MRSA  3/6 wound cx MRSA  3/3 BCx NGTD       Vitals/Labs  Ht: 185.4 cm (73\"); Wt: 129 kg (283 lb 8.2 oz)   Temp Readings from Last 1 Encounters:   03/18/25 97.2 °F (36.2 °C) (Oral)     Estimated Creatinine Clearance: 16.9 mL/min (A) (by C-G formula based on SCr of 4.92 mg/dL (H)).   HD planned likely for MWF per the nephrologist note 3/14    Results from last 7 days   Lab Units 03/18/25  0531 03/17/25  0552 03/16/25  0431   CREATININE mg/dL 4.92* 6.32* 5.42*   WBC 10*3/mm3 10.37 12.90* 13.87*     Assessment/Plan    Last ck 3/16 23    Current Daptomycin Dose: 800 mg once. (Intermittent - plan is to give 8mg/kg after each HD, pt back to room after HD )          Thank you for involving pharmacy in this patient's care. Please contact pharmacy with any questions or concerns.       Bernice Mei [facility]  Clinical Pharmacist                  "

## 2025-03-18 NOTE — NURSING NOTE
Pt c/o left chest pain, left  chest tender to touch, tachypneic, and short of breath, on o2 at 1.5L , see vs, pt given 1 dose of nitro, o2 increased to 2L, symptoms resolved.

## 2025-03-19 VITALS
SYSTOLIC BLOOD PRESSURE: 121 MMHG | TEMPERATURE: 97.5 F | BODY MASS INDEX: 34.77 KG/M2 | HEIGHT: 73 IN | HEART RATE: 53 BPM | DIASTOLIC BLOOD PRESSURE: 65 MMHG | WEIGHT: 262.35 LBS | RESPIRATION RATE: 16 BRPM | OXYGEN SATURATION: 99 %

## 2025-03-19 PROBLEM — I50.32 CHRONIC HEART FAILURE WITH PRESERVED EJECTION FRACTION (HFPEF): Status: ACTIVE | Noted: 2025-03-19

## 2025-03-19 LAB
ANION GAP SERPL CALCULATED.3IONS-SCNC: 9.1 MMOL/L (ref 5–15)
BUN SERPL-MCNC: 22 MG/DL (ref 8–23)
BUN/CREAT SERPL: 6 (ref 7–25)
CALCIUM SPEC-SCNC: 8.4 MG/DL (ref 8.6–10.5)
CHLORIDE SERPL-SCNC: 98 MMOL/L (ref 98–107)
CO2 SERPL-SCNC: 25.9 MMOL/L (ref 22–29)
CREAT SERPL-MCNC: 3.64 MG/DL (ref 0.76–1.27)
DEPRECATED RDW RBC AUTO: 52 FL (ref 37–54)
EGFRCR SERPLBLD CKD-EPI 2021: 16.1 ML/MIN/1.73
ERYTHROCYTE [DISTWIDTH] IN BLOOD BY AUTOMATED COUNT: 15.3 % (ref 12.3–15.4)
GLUCOSE BLDC GLUCOMTR-MCNC: 128 MG/DL (ref 70–130)
GLUCOSE BLDC GLUCOMTR-MCNC: 163 MG/DL (ref 70–130)
GLUCOSE SERPL-MCNC: 128 MG/DL (ref 65–99)
HCT VFR BLD AUTO: 29 % (ref 37.5–51)
HGB BLD-MCNC: 9 G/DL (ref 13–17.7)
MAGNESIUM SERPL-MCNC: 2.1 MG/DL (ref 1.6–2.4)
MCH RBC QN AUTO: 28.8 PG (ref 26.6–33)
MCHC RBC AUTO-ENTMCNC: 31 G/DL (ref 31.5–35.7)
MCV RBC AUTO: 92.9 FL (ref 79–97)
PHOSPHATE SERPL-MCNC: 3.8 MG/DL (ref 2.5–4.5)
PLATELET # BLD AUTO: 320 10*3/MM3 (ref 140–450)
PMV BLD AUTO: 9.5 FL (ref 6–12)
POTASSIUM SERPL-SCNC: 3.5 MMOL/L (ref 3.5–5.2)
RBC # BLD AUTO: 3.12 10*6/MM3 (ref 4.14–5.8)
SODIUM SERPL-SCNC: 133 MMOL/L (ref 136–145)
TROPONIN T SERPL HS-MCNC: 386 NG/L
WBC NRBC COR # BLD AUTO: 9.78 10*3/MM3 (ref 3.4–10.8)

## 2025-03-19 PROCEDURE — 25010000002 EPOETIN ALFA-EPBX 10000 UNIT/ML SOLUTION: Performed by: PODIATRIST

## 2025-03-19 PROCEDURE — 83735 ASSAY OF MAGNESIUM: CPT | Performed by: STUDENT IN AN ORGANIZED HEALTH CARE EDUCATION/TRAINING PROGRAM

## 2025-03-19 PROCEDURE — 93005 ELECTROCARDIOGRAM TRACING: CPT | Performed by: INTERNAL MEDICINE

## 2025-03-19 PROCEDURE — 99232 SBSQ HOSP IP/OBS MODERATE 35: CPT | Performed by: INTERNAL MEDICINE

## 2025-03-19 PROCEDURE — 84484 ASSAY OF TROPONIN QUANT: CPT | Performed by: INTERNAL MEDICINE

## 2025-03-19 PROCEDURE — 85027 COMPLETE CBC AUTOMATED: CPT | Performed by: STUDENT IN AN ORGANIZED HEALTH CARE EDUCATION/TRAINING PROGRAM

## 2025-03-19 PROCEDURE — 82948 REAGENT STRIP/BLOOD GLUCOSE: CPT

## 2025-03-19 PROCEDURE — 63710000001 INSULIN LISPRO (HUMAN) PER 5 UNITS: Performed by: PODIATRIST

## 2025-03-19 PROCEDURE — 80048 BASIC METABOLIC PNL TOTAL CA: CPT | Performed by: STUDENT IN AN ORGANIZED HEALTH CARE EDUCATION/TRAINING PROGRAM

## 2025-03-19 PROCEDURE — 25010000002 HEPARIN (PORCINE) PER 1000 UNITS: Performed by: PODIATRIST

## 2025-03-19 PROCEDURE — 84100 ASSAY OF PHOSPHORUS: CPT | Performed by: STUDENT IN AN ORGANIZED HEALTH CARE EDUCATION/TRAINING PROGRAM

## 2025-03-19 RX ORDER — ONDANSETRON 4 MG/1
4 TABLET, ORALLY DISINTEGRATING ORAL EVERY 6 HOURS PRN
Start: 2025-03-19

## 2025-03-19 RX ORDER — LINEZOLID 600 MG/1
600 TABLET, FILM COATED ORAL 2 TIMES DAILY
Start: 2025-03-20 | End: 2025-03-25

## 2025-03-19 RX ORDER — GLIPIZIDE 5 MG/1
2.5 TABLET ORAL
Start: 2025-03-19

## 2025-03-19 RX ORDER — HYDRALAZINE HYDROCHLORIDE 10 MG/1
10 TABLET, FILM COATED ORAL 3 TIMES DAILY
Start: 2025-03-19

## 2025-03-19 RX ORDER — SEVELAMER CARBONATE 800 MG/1
800 TABLET, FILM COATED ORAL
Start: 2025-03-19

## 2025-03-19 RX ORDER — AMOXICILLIN AND CLAVULANATE POTASSIUM 500; 125 MG/1; MG/1
1 TABLET, FILM COATED ORAL DAILY
Start: 2025-03-20 | End: 2025-03-25

## 2025-03-19 RX ORDER — AMOXICILLIN AND CLAVULANATE POTASSIUM 500; 125 MG/1; MG/1
1 TABLET, FILM COATED ORAL DAILY
Start: 2025-03-20 | End: 2025-03-19

## 2025-03-19 RX ORDER — LINEZOLID 600 MG/1
600 TABLET, FILM COATED ORAL 2 TIMES DAILY
Start: 2025-03-20 | End: 2025-03-19

## 2025-03-19 RX ORDER — BUMETANIDE 2 MG/1
2 TABLET ORAL 3 TIMES DAILY
Start: 2025-03-19

## 2025-03-19 RX ORDER — PANTOPRAZOLE SODIUM 40 MG/1
40 TABLET, DELAYED RELEASE ORAL
Start: 2025-03-20 | End: 2025-03-23

## 2025-03-19 RX ORDER — INSULIN LISPRO 100 [IU]/ML
2-9 INJECTION, SOLUTION INTRAVENOUS; SUBCUTANEOUS
Start: 2025-03-19

## 2025-03-19 RX ORDER — SEVELAMER CARBONATE 800 MG/1
800 TABLET, FILM COATED ORAL
Status: DISCONTINUED | OUTPATIENT
Start: 2025-03-19 | End: 2025-03-19 | Stop reason: HOSPADM

## 2025-03-19 RX ORDER — LINEZOLID 600 MG/1
600 TABLET, FILM COATED ORAL ONCE
Status: DISCONTINUED | OUTPATIENT
Start: 2025-03-19 | End: 2025-03-19 | Stop reason: HOSPADM

## 2025-03-19 RX ADMIN — EPOETIN ALFA-EPBX 10000 UNITS: 10000 INJECTION, SOLUTION INTRAVENOUS; SUBCUTANEOUS at 08:25

## 2025-03-19 RX ADMIN — HYDRALAZINE HYDROCHLORIDE 10 MG: 10 TABLET ORAL at 08:26

## 2025-03-19 RX ADMIN — METOPROLOL SUCCINATE 25 MG: 25 TABLET, EXTENDED RELEASE ORAL at 08:26

## 2025-03-19 RX ADMIN — HEPARIN SODIUM 4000 UNITS: 1000 INJECTION INTRAVENOUS; SUBCUTANEOUS at 15:20

## 2025-03-19 RX ADMIN — BUMETANIDE 2 MG: 1 TABLET ORAL at 08:26

## 2025-03-19 RX ADMIN — SEVELAMER CARBONATE 1600 MG: 800 TABLET, FILM COATED ORAL at 08:26

## 2025-03-19 RX ADMIN — TAMSULOSIN HYDROCHLORIDE 0.4 MG: 0.4 CAPSULE ORAL at 08:26

## 2025-03-19 RX ADMIN — OXYCODONE HYDROCHLORIDE AND ACETAMINOPHEN 250 MG: 500 TABLET ORAL at 08:26

## 2025-03-19 RX ADMIN — Medication 3 ML: at 08:27

## 2025-03-19 RX ADMIN — LINAGLIPTIN 5 MG: 5 TABLET, FILM COATED ORAL at 08:26

## 2025-03-19 RX ADMIN — PANTOPRAZOLE SODIUM 40 MG: 40 TABLET, DELAYED RELEASE ORAL at 06:53

## 2025-03-19 RX ADMIN — INSULIN LISPRO 6 UNITS: 100 INJECTION, SOLUTION INTRAVENOUS; SUBCUTANEOUS at 08:26

## 2025-03-19 RX ADMIN — MENTHOL, ZINC OXIDE 1 APPLICATION: .44; 20.6 OINTMENT TOPICAL at 08:27

## 2025-03-19 RX ADMIN — AMIODARONE HYDROCHLORIDE 200 MG: 200 TABLET ORAL at 08:26

## 2025-03-19 RX ADMIN — CLOPIDOGREL BISULFATE 75 MG: 75 TABLET ORAL at 08:26

## 2025-03-19 RX ADMIN — LIDOCAINE 1 PATCH: 4 PATCH TOPICAL at 08:25

## 2025-03-19 RX ADMIN — APIXABAN 2.5 MG: 2.5 TABLET, FILM COATED ORAL at 08:26

## 2025-03-19 RX ADMIN — Medication 1000 UNITS: at 08:26

## 2025-03-19 NOTE — PLAN OF CARE
Goal Outcome Evaluation:  Plan of Care Reviewed With: patient           Outcome Evaluation: Alert and oriented, remains on 2L oxygen, denies pain. Had dialysis, ivs removed, wound vac removed and replaced with wet to dry dressing of iodine soaked gauze, kerlix, and ace wrap. Belongings sent to facility with patient via [facility].

## 2025-03-19 NOTE — DISCHARGE PLACEMENT REQUEST
"Madyson Maciel Jr. (79 y.o. Male)       Date of Birth   1945    Social Security Number       Address   [address] [place] [address]    Home Phone   [phone]    MRN   [id number]       Moravian   Cheondoism    Marital Status                               Admission Date   3/3/2025    Admission Type   Emergency    Admitting Provider   Bishop Chakraborty MD    Attending Provider   Magdalena Diamond MD    Department, Room/Bed   [address], [address]/       Discharge Date       Discharge Disposition   Skilled Nursing Facility (DC - External)    Discharge Destination                                 Attending Provider: Magdalena Diamond MD    Allergies: Ceclor [Cefaclor]    Isolation: Contact   Infection: MRSA (25)   Code Status: CPR    Ht: 185.4 cm (73\")   Wt: 119 kg (262 lb 5.6 oz)    Admission Cmt: None   Principal Problem: Traumatic rhabdomyolysis [T79.6XXA]                   Active Insurance as of 3/3/2025       Primary Coverage       Payor Plan Insurance Group Employer/Plan Group    ANTHEM MEDICARE REPLACEMENT ANTH MEDICARE ADVANTAGE HMO [id number]       Payor Plan Address Payor Plan Phone Number Payor Plan Fax Number Effective Dates    [address] [phone]  2025 - None Entered    [facility] [id number]         Subscriber Name Subscriber Birth Date Member ID       MADYSON MACIEL JR. 1945 [id number]                     Emergency Contacts        (Rel.) Home Phone Work Phone Mobile Phone    Claudette Maciel (Daughter) [phone] -- [phone]    RAJ MEJIA (Friend) [phone] -- [phone]                 Discharge Summary        Magdalena Diamond MD at 25 1126              Patient Name: Madyson Maciel Jr.  : 1945  MRN: [id number]    Date of Admission: 3/3/2025  Date of Discharge:  3/19/2025  Primary Care Physician: Denise Dickson, APRN      Chief Complaint:   Fall      Discharge " Diagnoses     Active Hospital Problems    Diagnosis  POA    **Traumatic rhabdomyolysis [T79.6XXA]  Yes    Chronic heart failure with preserved ejection fraction (HFpEF) [I50.32]  Yes    MRSA (methicillin resistant Staphylococcus aureus) infection [A49.02]  Yes    Contusion of left knee [S80.02XA]  Yes    Acute osteomyelitis of right foot [M86.071]  Yes    Diabetic foot infection [E11.628, L08.9]  Yes    ATN (acute tubular necrosis) [N17.0]  Yes    Closed displaced fracture of left clavicle [S42.002A]  Yes    Pneumonia due to infectious organism [J18.9]  Yes    Acute kidney injury [N17.9]  Unknown    Persistent atrial fibrillation [I48.19]  Yes    Chronic anticoagulation [Z79.01]  Not Applicable    Stage 3b chronic kidney disease [N18.32]  Yes    Chronic diastolic (congestive) heart failure [I50.32]  Yes    KILLIAN (acute kidney injury) [N17.9]  Yes    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        Hospital Course     79 year old man who presented following a mechanical fall leading to rhabdomyolysis, left knee contusion with left knee effusion and meniscus tear, and a clavicular fracture. He was also found to have KILLIAN on CKD 4, pneumonia, osteomyelitis and abscess/cellulitis of the right foot.     Osteomyelitis and abscess of the right foot  -s/p I&D and partial 4th metatarsal excision on 3/10/25 by Dr. Mchugh and then additional debridement of nonviable necrotic tissue on 3/13/25.   -Infectious disease evaluated, was treated with IV  daptomycin and unasyn while hospitalized and discharged on renally dosed Augmentin and linezolid to  "complete 2 weeks of antibiotics with last day of antibiotics on 03/24/2025 per infectious disease recommendations.      KILLIAN on CKD 4  -Multifactorial secondary to rhabdomyolysis,rerenal insult related to diuretics, ATN  -Nephrology was consulted.  Patient underwent RIJ TDC placement on 3/11/2025 initiated on dialysis 03/12/2025.  -Continue outpatient dialysis per nephrology  -Will need to follow-up with vascular surgery in 4 to 6 weeks to discuss indwelling access for dialysis    Traumatic rhabdomyolysis  CK was 5033 on admission.  Resolved with IV  fluids.       Post procedural right apical pneumothorax  -Tiny.  Resolved on repeat chest xray     Pneumonia  -completed a course of zosyn while hospitalized        Left clavicular fracture  -orthopedic surgery evaluated with recommendations as below  \"Avoid forward flexion until further signs of consolidation/healing on radiographs   Sling as tolerated   PT   Ice Pain control \"       Left knee contusion with possible quad tendon tear  -MRI left knee showed with lateral aspect of quad tendon tear partial tearing without full thickness tear.   -Orthopedic surgery evaluated.  Conditions as below  \"knee immobilizer for the left knee. WBAT in knee immobilizer or hinged knee brace. Keep knee straight.  -discussed that this injury can be treated conservatively as he is extremely high risk for surgical intervention and its associated complications with infection and increased risk of morbidity. To remain in hinged knee brace or knee immobilizer at least 3 more weeks then begin ROM with PT. \"         Anemia of chronic disease/CKD  -hgb down to 7.3 03/14/2025.  Status post 1 unit of PRBC transfused.    -Hemoglobin remained stable around 8-9.  Recommend CBC in 1 week to monitor hemoglobin.    Apical chest pain  Chronic afib  Continue diastolic heart failure  CAD  -Continue outpatient Plavix, statin, metoprolol  -Cardiology evaluated.  Chest pain atypical, not a great candidate for " invasive workup.  Recommended outpatient follow-up in 1 month.    -  Type 2 diabetes  -hemoglobin A1c is 6.  -Decreased glipizide to 2.5 twice daily at discharge to decrease risk of hypoglycemia.  Continue outpatient linagliptin.  Sliding scale insulin, hypoglycemia protocol       Insomnia  -Nightly melatonin    At the time of discharge patient was told to take all medications as prescribed, keep all follow-up appointments, and call their doctor or return to the hospital with any worsening or concerning symptoms.           Day of Discharge     Subjective:  Patient laying in bed.  No acute events overnight.  Denies further episodes of of chest pain.  Overall feeling better.  Discussed with cardiology, no indication for invasive workup since no further episodes of chest pain and EKG with no ischemic changes, and troponin relatively stable compared to yesterday in the setting of ESRD.    Physical Exam:  Temp:  [97.5 °F (36.4 °C)-98 °F (36.7 °C)] 97.5 °F (36.4 °C)  Heart Rate:  [66-75] 72  Resp:  [16-18] 18  BP: (106-187)/(53-81) 149/53  Body mass index is 34.61 kg/m².  Physical Exam  General: Alert, laying in bed, not in distress, chronically ill-appearing  HEENT: Normocephalic, atraumatic,   CV: Regular rate and rhythm, no murmur, right upper chest HD catheter in place  Lungs: CTA anteriorly, no wheezing,  Abdomen: Soft, nontender, nondistended  Extremities: Bilateral lower extremity edema, status post left foot amputation. Right foot dressing in place   Consultants     Consult Orders (all) (From admission, onward)       Start     Ordered    03/18/25 0904  Inpatient Cardiology Consult  Once        Specialty:  Cardiology  Provider:  Luke Clancy MD    03/18/25 0905    03/10/25 1118  Inpatient Vascular Surgery Consult  Once        Specialty:  Vascular Surgery  Provider:  Pasquale Garcia MD    03/10/25 1117    03/09/25 1048  Inpatient Cardiology Consult  Once        Specialty:  Cardiology  Provider:  Zaid  Sanchez HARPER III, MD    03/09/25 1048    03/07/25 0902  Inpatient Infectious Diseases Consult  Once        Specialty:  Infectious Diseases  Provider:  Nicolasa Denny MD    03/07/25 0902    03/06/25 0947  Inpatient Podiatry Consult  Once        Specialty:  Podiatry  Provider:  Jimenez Mchugh DPM    03/06/25 0946    03/05/25 1141  Inpatient Urology Consult  Once        Specialty:  Urology  Provider:  Prosper Cornejo MD    03/05/25 1141    03/05/25 0541  Inpatient Spiritual Care Consult  Once        Comments: Pt requesting [facility] for breann Wednesday.   Provider:  (Not yet assigned)    03/05/25 0542    03/04/25 0702  Inpatient Orthopedic Surgery Consult  IN AM        Specialty:  Orthopedic Surgery  Provider:  Camilo Hunter MD    03/03/25 1741    03/04/25 0304  Inpatient Case Management  Consult  Once        Provider:  (Not yet assigned)    03/04/25 0322    03/03/25 1650  [facility] (on-call MD unless specified) Details  Once        Specialty:  Hospitalist  Provider:  Bishop Chakraborty MD    03/03/25 1649    03/03/25 1526  Nephrology (on -call MD unless specified)  Once        Specialty:  Nephrology  Provider:  Les Talavera MD    03/03/25 1525    03/03/25 1519  Nephrology (on -call MD unless specified)  Once,   Status:  Canceled        Specialty:  Nephrology  Provider:  (Not yet assigned)    03/03/25 1518                  Procedures     DEBRIDEMENT FOOT, RIGHT      Imaging Results (All)       Procedure Component Value Units Date/Time    XR Chest 2 View [[id number]] Collected: 03/12/25 1332     Updated: 03/12/25 1340    Narrative:      XR CHEST 2 VW-     HISTORY: Male who is 79 years-old, pneumothorax, follow-up     TECHNIQUE: Frontal and lateral views of the chest     COMPARISON: 3/12/2025     FINDINGS: Right-sided central venous catheter appears stable. Sternotomy  wires are noted. Heart is enlarged. Pulmonary vasculature is congested.  Bilateral pulmonary opacities appear similar to prior exam.  Small left  pleural effusion is apparent. No pneumothorax. No acute osseous process.       Impression:      No significant change.     This report was finalized on 3/12/2025 1:37 PM by Dr. Azam Alaniz M.D on Workstation: [id number]       XR Chest 1 View [[id number]] Collected: 03/12/25 1157     Updated: 03/12/25 1204    Narrative:      XR CHEST 1 VW-     HISTORY: Male who is 79 years-old, pneumothorax, follow-up     TECHNIQUE: Frontal views of the chest     COMPARISON: 3/11/2025     FINDINGS: Right-sided central venous catheter appears stable. Sternotomy  wires are noted. The heart is enlarged. Pulmonary vasculature appears  mildly congested. Some hazy opacity at the left midlung may represent  infiltrate, follow-up advised. Minimal left pleural effusion is  suggested. No definite pneumothorax. Right hemidiaphragm remains  elevated. No acute osseous process.       Impression:      As described.     This report was finalized on 3/12/2025 12:00 PM by Dr. Azam Alaniz M.D on Workstation: [id number]       XR Chest Post CVA Port [[id number]] Collected: 03/11/25 1737     Updated: 03/11/25 1746    Narrative:      XR CHEST POST CVA PORT-     CLINICAL HISTORY:  s/p tunneled catheter placement, eval for  pneumothorax; T79.6XXA-Traumatic ischemia of muscle, initial encounter;  N17.9-Acute kidney failure, unspecified; N18.9-Chronic kidney disease,  unspecified; A41.9-Sepsis, unspecified organism; J18.9-Pneumonia,  unspecified organism; R73.9-Hyperglycemia, unspecified; D64.9-Anemia,  unspecified; S42.032A-Displaced fracture of lateral end of left  clavicle, init     COMPARISON: 3/9/2025 a single portable view of the chest was obtained.  The study is hampered by the patient's body habitus. A dual-lumen  central line is in place with the tip at the junction of the superior  vena cava and right atrium. There is prominence of the pulmonary  interstitium and lesser extent vasculature similar in appearance as  compared to  3/9/2025. There is no evidence of consolidation or effusion.  There is a lucency appreciated at the right lung apex suspicious for a  very small pneumothorax.     The above information was called to the patient's nurse. The patient's  nurse is to relay the information to the clinical service.           This report was finalized on 3/11/2025 5:43 PM by Dr. Bishop Tran M.D  on Workstation: [id number]       FL C Arm During Surgery [[id number]] Resulted: 03/11/25 1732     Updated: 03/11/25 1732    Narrative:      This procedure was auto-finalized with no dictation required.    XR Foot 3+ View Right [[id number]] Collected: 03/10/25 1126     Updated: 03/10/25 1132    Narrative:      XR FOOT 3+ VW RIGHT-     INDICATIONS: Postoperative evaluation     TECHNIQUE: 3 views of the right foot     COMPARISON: 3/6/2025     FINDINGS:     Since the prior exam, partial resection of the fourth metatarsal has  occurred at the level of the mid metatarsal. The bones otherwise appear  stable. Surgical soft tissue gas is present, and persistent soft tissue  swelling is noted. Arterial calcifications are again seen.       Impression:         Postsurgical changes.           This report was finalized on 3/10/2025 11:29 AM by Dr. Azam Alaniz M.D on Workstation: [id number]       XR Chest 1 View [[id number]] Collected: 03/09/25 1813     Updated: 03/09/25 1819    Narrative:      XR CHEST 1 VW-     DATE OF EXAM: 3/9/2025 5:26 PM     INDICATION: Evaluate for pulmonary edema.     COMPARISON: Radiographs 3/3/2025, 11/14/2024, 11/10/2024. CT abdomen and  pelvis 11/25/2024. CT chest 5/7/2024..     TECHNIQUE: Portable upright AP views of the chest were obtained.     FINDINGS:  Lordotic positioning. Overlying artifacts. Stable sternotomy wires,  postoperative changes from CABG, and prosthetic heart valve. Chronic  elevation of the right hemidiaphragm with right perihilar and right  basilar subsegmental atelectasis and/or scarring. Indistinct  pulmonary  vasculature with hazy groundglass opacities and interstitial thickening  in both lungs, suggesting mild pulmonary edema or atypical pneumonia.  Blunting of the left costophrenic angle, which could represent a small  pleural effusion. Calcified remnants of prior granulomatous disease. No  pneumothorax. Stable cardiomegaly, likely accentuated by technique.  Incomplete evaluated chronic changes in both shoulders. No acute osseous  abnormality is identified.       Impression:         1. Stable cardiomegaly with indistinct pulmonary vasculature and hazy  groundglass opacities and interstitial thickening throughout both lungs,  suggesting mild pulmonary edema or atypical pneumonia, with a suspected  small left pleural effusion.  2. Chronic elevation of the right hemidiaphragm with right perihilar and  right basilar subsegmental atelectasis and/or scarring.     This report was finalized on 3/9/2025 6:16 PM by Alverto Jones MD on  Workstation: [id number]       MRI Knee Left Without Contrast [[id number]] Collected: 03/08/25 1735     Updated: 03/08/25 1755    Narrative:      MRI KNEE LEFT  WO CONTRAST-     Date of Exam: 3/8/2025 3:09 PM     Indication: Increased pain and swelling status post fall. Equivocal exam  regarding possible quadriceps tendon tear.     Comparison: Radiographs 3/7/2025.     Technique: Multiplanar, multisequence MR imaging of the knee was  performed without contrast.     FINDINGS:     Soft tissues: Partially imaged moderate to large joint effusion with  diffuse synovial thickening and/or intra-articular debris. Trace fluid  extends into a thin popliteal cyst, measuring 7 cm craniocaudal. Patchy  soft tissue edema, greatest anteriorly. Multiple postoperative foci of  susceptibility artifact in the soft tissues at the anterior distal thigh  and knee. No solid soft tissue mass.     Menisci: Complex tear of the medial meniscus body and posterior horn  with a 7 mm meniscal body flap flipped into  the medial femoral gutter.  Longitudinal horizontal oblique tear of the lateral meniscus posterior  body and posterior horn extending to the inferior articular surface with  a full-thickness or high-grade near full-thickness radial tear of the  lateral meniscus posterior root.     Cruciate Ligaments: The ACL is intact.  The PCL is intact.     Collateral ligaments: The MCL is intact.  The LCL complex is intact.     Extensor Mechanism: Evidence of prior distal quadriceps tendon repair  with partial ossification of the distal quadriceps tendon. Complex  recurrent moderate to high-grade partial-thickness tear/avulsion of the  distal quadriceps tendon with up to an estimated 2.5 cm proximal  retraction of the torn deep medial tendon fibers. Likely chronic partial  ossification of the otherwise intact proximal patellar tendon. Severe  diffuse muscular fatty atrophy.     Articular cartilage: Diffuse grade II-III chondromalacia in the  patellofemoral compartment with multifocal full-thickness/near  full-thickness chondral fissuring at the lateral patellar facet and  lateral trochlea. Diffuse grade II-III chondromalacia in the medial  compartment with focal full-thickness or near full-thickness chondral  fissuring at the medial weightbearing portion of the medial femoral  condyle and medial tibial plateau and a small 6 mm suspected unstable  osteochondral fragment at the medial weightbearing portion of the medial  femoral condyle. Diffuse grade II chondromalacia in the lateral  compartment.     Bones: Suspected small 6 mm chronic unstable osteochondral fragment at  the medial weightbearing portion of the medial femoral condyle. Tiny  subchondral cystic changes at the patella and trochlea related overlying  chondromalacia. Postoperative changes in the patella. No acute fracture.  No concerning bone marrow lesions or marrow replacing process.       Impression:         1. Evidence of prior distal quadriceps tendon repair with a  complex  recurrent moderate to high-grade partial-thickness tear/avulsion with  mild proximal retraction and partial ossification of the distal  quadriceps tendon. Recommend comparison to prior outside imaging if  available. Severe diffuse muscular fatty atrophy.  2. Tricompartmental chondromalacia/DJD, most severe in the medial and  patellofemoral compartments, with a small 6 mm suspected unstable  osteochondral fragment of the medial weightbearing portion of the medial  femoral condyle.  3. Complex tear of the medial meniscus body and posterior horn with a 7  mm meniscal body flap flipped into the medial femoral gutter.  4. Longitudinal horizontal oblique tear of the lateral meniscus  posterior body and posterior horn extending to the intra-articular  surface with a full-thickness or high-grade near full-thickness radial  tear of the lateral meniscus posterior horn.  5. Partially imaged moderate to large joint effusion with diffuse  synovial thickening and/or intra-articular debris and a thin popliteal  cyst           This report was finalized on 3/8/2025 5:52 PM by Alverto Jones MD on  Workstation: [id number]       XR Knee 1 or 2 View Left [[id number]] Collected: 03/07/25 1435     Updated: 03/07/25 1441    Narrative:      XR KNEE 1 OR 2 VW LEFT-     DATE OF EXAM: 3/7/2025 1:51 PM     INDICATION: Knee pain and swelling status post fall 5 days ago.     COMPARISON: Left femur radiographs 5/6/2019.     TECHNIQUE: AP and lateral views of the knee were obtained.     FINDINGS:  The lateral views are limited by oblique patient positioning. Diffuse  osteopenia. Ossifications superior to the patella could represent  osteochondral bodies in the suprapatellar joint space, partial  ossification of the quadriceps tendon, and/or avulsion fragments from  the superior patella. Small inferior patellar enthesophyte. Severe DJD  in the medial compartment with associated mild varus angulation. Lateral  and patellofemoral joint  spaces are fairly well-maintained. Moderate to  large knee joint effusion. Severe vascular calcifications.       Impression:         1. Diffuse osteopenia and moderate to large knee joint effusion with  ossification superior to the patella that could represent osteochondral  bodies suprapatellar joint space, partial ossification of the quadriceps  tendon, and/or avulsion fracture fragments from the superior patella.  Recommend correlating for quadriceps tendon integrity.  3. Severe DJD in the medial compartment.     This report was finalized on 3/7/2025 2:38 PM by Alverto Jones MD on  Workstation: [id number]       MRI Foot Right Without Contrast [[id number]] Collected: 03/07/25 0929     Updated: 03/07/25 1305    Narrative:      MRI RIGHT FOREFOOT WITHOUT CONTRAST     HISTORY: Ulcer. Possible abscess.     TECHNIQUE: MRI includes axial T1, STIR as well as coronal T1, T2  fat-sat, and sagittal PD  fat-saturated sequences through the forefoot.     COMPARISON: Foot x-rays 03/06/2025.     FINDINGS: Lateral forefoot soft tissue wound is centered lateral to the  fifth metatarsal and communicates with a sinus tract that contains fluid  signal consistent with abscess formation that surrounds the distal shaft  and head of the fourth metatarsal in patient with previous amputation of  the fourth toe at the fourth MTP joint. Abscess contains several bubbles  of soft tissue air and there is also air within the surrounding soft  tissues consistent with infection. The fluid signal surrounding the  distal fourth metatarsal shaft measures approximately 3.5 cm in height  by 2.3 cm transverse and extends 3.5 cm in AP dimension. There are  bubbles of air within the head of the fourth metatarsal associated with  areas of cortical bone loss and osteomyelitis of the distal fourth  metatarsal over the distal shaft and head.     There is a chronic thin linear band of calcification extending distal to  the partially amputated fifth  metatarsal. Abscess contacts this  calcification which appears to be infected though there is no evidence  for infection of the cancellous bone of the remaining proximal fifth  metatarsal.     No evidence for osteomyelitis of the third through fifth metatarsals or  toes. There is bony fusion at the tarsometatarsal joints, cuneiform  joints. Generalized muscular atrophy with fat replacement. Myositis  surrounding the distal fourth metatarsal. There is generalized soft  tissue swelling with edema and swelling in subcutaneous fat about the  forefoot consistent with cellulitis.       Impression:      1. Soft tissue wound lateral to the fifth metatarsal communicates with  fluid signal/abscess that contains bubbles of air and surrounds the  distal fourth metatarsal where there is osteomyelitis. Fluid signal and  suspected abscess also contacts thin band of dystrophic calcification  extending distal to the amputated segment of the fifth metatarsal  without evidence for osteomyelitis of the cancellous bone of the  remaining proximal fifth metatarsal.  2. Generalized forefoot cellulitis.  3. Chronic muscular atrophy consistent with chronic neuropathy.     This report was finalized on 3/7/2025 1:02 PM by Freddy Guzman M.D on  Workstation: [id number]       XR Foot 3+ View Right [[id number]] Collected: 03/06/25 2055     Updated: 03/06/25 2103    Narrative:      XR FOOT 3+ VW RIGHT-     INDICATIONS: Ulcer, check for osteomyelitis     TECHNIQUE: 4 views of the right foot     COMPARISON: None available     FINDINGS:     The fifth ray is mostly absent, with only residual portion of the base  of the fifth metatarsal apparent, presumably as a result of prior  partial amputation, but potentially sequela of erosion. Likewise, no  left fourth toe is seen. Soft tissue gas is seen around the head of the  fourth metatarsal, compatible with gas producing infection. Some  portions of the cortex of the head of the fourth metatarsal  are  indistinct, suspicious for osteomyelitis, suggest correlation with MRI  or bone scan. No other evidence of erosions. No acute fractures are  noted. Soft tissue swelling especially at the mid to distal foot may  reflect cellulitis. Arterial calcifications are conspicuous. Pes planus  configuration is apparent.       Impression:         Suspicion for osteomyelitis involving the fourth metatarsal head  (consider further evaluation with MRI or bone scan) with surrounding  cellulitis, as well as soft tissue gas suspicious for gas producing  infection.     Discussed by telephone with patient's nurse, Angela, at time of  interpretation, 2058, 3/6/2025.           This report was finalized on 3/6/2025 9:00 PM by Dr. Azam Alaniz M.D on Workstation: [id number]       XR Shoulder 2+ View Left [[id number]] Collected: 03/03/25 1523     Updated: 03/04/25 0719    Narrative:      XR SHOULDER 2+ VW LEFT-, XR HUMERUS LEFT-     HISTORY: 79-year-old male status post shoulder pain following a fall.     FINDINGS:  There is a comminuted fracture of the distal clavicle with 1.5 cm caudal  displacement of the clavicular shaft. 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 There is significant narrowing of the subacromial space.  There is no shoulder dislocation and the left humerus appears  unremarkable.     This report was finalized on 3/4/2025 7:15 AM by Dr. Latrice Rodriguez M.D on  Workstation: [id number]       XR Humerus Left [[id number]] Collected: 03/03/25 1523     Updated: 03/04/25 0719    Narrative:      XR SHOULDER 2+ VW LEFT-, XR HUMERUS LEFT-     HISTORY: 79-year-old male status post shoulder pain following a fall.     FINDINGS:  There is a comminuted fracture of the distal clavicle with 1.5 cm caudal  displacement of the clavicular shaft. 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 There  is significant narrowing of the subacromial space.  There is no shoulder dislocation and the left humerus appears  unremarkable.     This report was finalized on 3/4/2025 7:15 AM by Dr. Latrice Rodriguez M.D on  Workstation: [id number]       US Renal Bilateral [[id number]] Collected: 03/03/25 1704     Updated: 03/03/25 1708    Narrative:      US RENAL BILATERAL-3/3/2025     HISTORY: Renal insufficiency.     Right kidney measures 8.7 cm in length. Left kidney measures 10.1 cm in  length. Urinary bladder is well distended. There appears to be bladder  diverticulum arising posteriorly.     No solid renal masses, stones or hydronephrosis is seen.       Impression:      1. Unremarkable bilateral renal ultrasound.  2. Urinary bladder diverticulum.        This report was finalized on 3/3/2025 5:05 PM by Dr. Marquez Olmstead M.D on Workstation: [id number]       XR Chest 1 View [[id number]] Collected: 03/03/25 1652     Updated: 03/03/25 1658    Narrative:      XR CHEST 1 VW-3/3/2025     HISTORY: Leukocytosis.     Heart size is mildly enlarged. There is some mild atelectasis or  pneumonia in the right lung base. Calcified plaques are seen in the left  hemithorax. Sternotomy wires are seen.       Impression:      1. Mild cardiomegaly.  2. Mild patchy atelectasis or pneumonia in the right lung base.        This report was finalized on 3/3/2025 4:55 PM by Dr. Marquez Olmstead M.D on Workstation: [id number]       CT Head Without Contrast [[id number]] Collected: 03/03/25 1550     Updated: 03/03/25 1603    Narrative:      CT HEAD WO CONTRAST-     INDICATIONS: Head injury     TECHNIQUE: Radiation dose reduction techniques were utilized, including  automated exposure control and exposure modulation based on body size.  Noncontrast head CT     COMPARISON: 1/23/2018     FINDINGS:           No acute intracranial hemorrhage, midline shift or mass effect. No acute  territorial infarct is identified.       Mild periventricular  hypodensities suggest chronic small vessel ischemic  change in a patient this age.     Arterial calcifications are seen at the base of the brain.     Ventricles, cisterns, cerebral sulci are unremarkable for patient age.     Right mastoid air cells are opacified, and right middle ear is partly  opacified. Small paranasal sinus mucosal thickening is present. The  visualized paranasal sinuses, orbits, mastoid air cells are otherwise  unremarkable.          Impression:         No acute intracranial hemorrhage or hydrocephalus. If there is further  clinical concern, MRI could be considered for further evaluation.     This report was finalized on 3/3/2025 4:00 PM by Dr. Azam Alaniz M.D on Workstation: [id number]             Results for orders placed during the hospital encounter of 03/17/21    Duplex venous lower extremity bilateral CAR    Interpretation Summary  · There was deep venous valvular incompetence noted in the right common femoral and popliteal.  · There was superficial venous valvular incompetence noted in the right greater saphenous (below knee).  · Chronic right lower extremity superficial thrombophlebitis noted in the greater saphenous (below knee).  · There was superficial venous valvular incompetence noted in the left greater saphenous (below knee).  · Chronic left lower extremity superficial thrombophlebitis noted in the small saphenous.  · All other veins appeared normal bilaterally.    Results for orders placed during the hospital encounter of 10/03/24    Adult Transthoracic Echo Complete W/ Cont if Necessary Per Protocol    Interpretation Summary    Left ventricular ejection fraction appears to be 61 - 65%.    Left ventricular diastolic function was indeterminate.    The left atrial cavity is moderately dilated.    Left atrial volume is moderately increased.    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    Estimated right ventricular systolic pressure from tricuspid regurgitation is  "moderately elevated (45-55 mmHg).    Pertinent Labs     Results from last 7 days   Lab Units 03/19/25 0527 03/18/25 0531 03/17/25 0552 03/16/25 0431   WBC 10*3/mm3 9.78 10.37 12.90* 13.87*   HEMOGLOBIN g/dL 9.0* 8.8* 8.9* 9.1*   PLATELETS 10*3/mm3 320 319 326 304     Results from last 7 days   Lab Units 03/19/25 0527 03/18/25 0531 03/17/25 0552 03/16/25 0431   SODIUM mmol/L 133* 132* 136 135*   POTASSIUM mmol/L 3.5 3.8 4.6 4.3   CHLORIDE mmol/L 98 98 100 101   CO2 mmol/L 25.9 20.9* 20.1* 21.0*   BUN mg/dL 22 34* 48* 41*   CREATININE mg/dL 3.64* 4.92* 6.32* 5.42*   GLUCOSE mg/dL 128* 104* 104* 118*   Estimated Creatinine Clearance: 21.9 mL/min (A) (by C-G formula based on SCr of 3.64 mg/dL (H)).  Results from last 7 days   Lab Units 03/14/25  0518   ALBUMIN g/dL 2.4*     Results from last 7 days   Lab Units 03/19/25  0527 03/18/25  0531 03/17/25  0552 03/16/25  0431 03/15/25  0439 03/14/25  0518   CALCIUM mg/dL 8.4* 8.1* 8.2* 7.8*   < > 7.6*   ALBUMIN g/dL  --   --   --   --   --  2.4*   MAGNESIUM mg/dL 2.1 2.2 2.4 2.3  --   --    PHOSPHORUS mg/dL 3.8 5.1* 7.3* 5.7*  --  6.5*    < > = values in this interval not displayed.       Results from last 7 days   Lab Units 03/19/25 0527 03/18/25  0948 03/18/25 0531 03/16/25 0431   CK TOTAL U/L  --   --   --  23   HSTROP T ng/L 386* 314* 293*  --            Invalid input(s): \"LDLCALC\"          Test Results Pending at Discharge       Discharge Details        Discharge Medications        New Medications        Instructions Start Date   amoxicillin-clavulanate 500-125 MG per tablet  Commonly known as: Augmentin   500 mg, Oral, Daily, take after HD on dialysis days   Start Date: March 20, 2025     insulin lispro 100 UNIT/ML injection  Commonly known as: HUMALOG/ADMELOG   2-9 Units, Subcutaneous, 4 Times Daily Before Meals & Nightly      linezolid 600 MG tablet  Commonly known as: ZYVOX   600 mg, Oral, 2 Times Daily   Start Date: March 20, 2025     melatonin 5 MG tablet " tablet   5 mg, Oral, Nightly      ondansetron ODT 4 MG disintegrating tablet  Commonly known as: ZOFRAN-ODT   4 mg, Oral, Every 6 Hours PRN      pantoprazole 40 MG EC tablet  Commonly known as: PROTONIX   40 mg, Oral, Every Early Morning   Start Date: March 20, 2025     sevelamer 800 MG tablet  Commonly known as: RENVELA   800 mg, Oral, 3 Times Daily With Meals             Changes to Medications        Instructions Start Date   bumetanide 2 MG tablet  Commonly known as: BUMEX  What changed:   medication strength  how much to take  when to take this   2 mg, Oral, 3 Times Daily      glipizide 5 MG tablet  Commonly known as: GLUCOTROL  What changed: how much to take   2.5 mg, Oral, 2 Times Daily Before Meals      hydrALAZINE 10 MG tablet  Commonly known as: APRESOLINE  What changed:   medication strength  how much to take   10 mg, Oral, 3 Times Daily             Continue These Medications        Instructions Start Date   Accu-Chek Keshia Plus test strip  Generic drug: glucose blood   USE TO TEST BLOOD SUGAR    TWICE DAILY FOR DIABETES      acetaminophen 325 MG tablet  Commonly known as: TYLENOL   650 mg, Oral, Every 6 Hours PRN      amiodarone 200 MG tablet  Commonly known as: PACERONE   Take 1 tablet by mouth Daily.      atorvastatin 20 MG tablet  Commonly known as: LIPITOR   20 mg, Oral, Nightly, NEED TO SEE PROVIDER FOR FUTURE REFILLS.      cholecalciferol 25 MCG (1000 UT) tablet  Commonly known as: VITAMIN D3   1,000 Units, Oral, Every 7 Days      clopidogrel 75 MG tablet  Commonly known as: PLAVIX   75 mg, Oral, Daily      Eliquis 2.5 MG tablet tablet  Generic drug: apixaban   2.5 mg, Oral, 2 Times Daily      metoprolol succinate XL 25 MG 24 hr tablet  Commonly known as: TOPROL-XL   25 mg, Oral, Daily      nitroglycerin 0.4 MG SL tablet  Commonly known as: NITROSTAT   0.4 mg, Sublingual, Every 5 Minutes PRN, Take no more than 3 doses in 15 minutes.      terazosin 2 MG capsule  Commonly known as: HYTRIN   Take 1  capsule by mouth Every Night.      Tradjenta 5 MG tablet tablet  Generic drug: linagliptin   Take 1 tablet by mouth Daily.      vitamin C 250 MG tablet  Commonly known as: ASCORBIC ACID   250 mg, Daily      Zinc 50 MG tablet                Allergies   Allergen Reactions    Ceclor [Cefaclor] Rash     Rash in his 50s; he tolerated piperacillin-tazobactam in March 2020       Discharge Disposition:  Skilled Nursing Facility (DC - External)      Discharge Diet:  Diet Order   Procedures    Diet: Renal; Low Potassium, Low Phosphorus, Low Sodium (2-3g); Fluid Consistency: Thin (IDDSI 0)       Discharge Activity:       CODE STATUS:    Code Status and Medical Interventions: CPR (Attempt to Resuscitate); Full Support   Ordered at: 03/03/25 9390     Code Status (Patient has no pulse and is not breathing):    CPR (Attempt to Resuscitate)     Medical Interventions (Patient has pulse or is breathing):    Full Support     Level Of Support Discussed With:    Patient       Future Appointments   Date Time Provider Department Center   4/8/2025  1:00 PM Mary Beth Steel APRN [facility]   4/14/2025  1:00 PM [facility] OP VAS RM 2 BH [facility]   4/30/2025  2:30 PM Jeaneth Bonds MD [facility]      Contact information for follow-up providers       Jeaneth Bonds MD Follow up in 6 week(s).    Specialty: Vascular Surgery  Contact information:  [address] [id number] [phone]               Denise Dickson APRN .    Specialties: Nurse Practitioner, Internal Medicine, Family Medicine  Contact information:  [address] [facility] [id number] [phone]               Jo Toney MD. Schedule an appointment as soon as possible for a visit in 1 month(s).    Specialty: Cardiology  Contact information:  [address] [facility] [id number] [phone]                       Contact information for after-discharge care       Destination       [facility] .     Service: Skilled Nursing  Contact information:  [address] [facility] [address]  [phone]                                   Time Spent on Discharge:  Greater than 30 minutes      Magdalena Diamond MD  [facility]  03/19/25  11:27 EDT                Electronically signed by Magdalena Diamond MD at 03/19/25 1215

## 2025-03-19 NOTE — CASE MANAGEMENT/SOCIAL WORK
Case Management Discharge Note      Final Note: [facility] via [facility] van at 6pm. No additional CCP needs.         Selected Continued Care - Admitted Since 3/3/2025       Destination Coordination complete.      Service Provider Services Address Phone Fax Patient Preferred    [facility] [address] , [facility] [address] [phone] --              Durable Medical Equipment    No services have been selected for the patient.                Dialysis/Infusion    No services have been selected for the patient.                Home Medical Care    No services have been selected for the patient.                Therapy    No services have been selected for the patient.                Community Resources    No services have been selected for the patient.                Community & DME    No services have been selected for the patient.                         Final Discharge Disposition Code: 03 - skilled nursing facility (SNF)

## 2025-03-19 NOTE — SIGNIFICANT NOTE
Receive report from dialysis nurse, patient arrived to floor and transport was here to take patient to facility. Ivs removed, tele removed, and wet to dry dressing placed on right foot wound.Patient sent with all belongings.

## 2025-03-19 NOTE — CASE MANAGEMENT/SOCIAL WORK
Continued Stay Note  [facility]     Patient Name: Rock Maciel Jr.  MRN: [id number]  Today's Date: 3/19/2025    Admit Date: 3/3/2025    Plan: [facility] van at 6pm.   Discharge Plan       Row Name 03/19/25 1310       Plan    Plan [facility] van at 6pm.    Patient/Family in Agreement with Plan yes    Plan Comments Tee BRICENO/CCP notified CCP pre-cert approved and pt valid to admit until 3/25. MD and RN notified; DC orders noted. [facility] [employer] transport arranged for 6pm, confirmation #[id number] and cost $267. MD, RN and Mirella/Garo notified of transport time. [facility] stated wound vac is at the building and RN will place a wet to dry dressing for DC then [facility] will place the wound vac when pt arrives. RN updated. Packet given to RN. Leonardo AYON/CCP    Final Discharge Disposition Code 03 - skilled nursing facility (SNF)    Final Note [facility] [employer] [facility] at 6pm. No additional CCP needs.                   Discharge Codes    No documentation.                 Expected Discharge Date and Time       Expected Discharge Date Expected Discharge Time    Mar 19, 2025               Pauly Martinez, RN

## 2025-03-19 NOTE — PROGRESS NOTES
"[facility] Clinical Pharmacy Services: Daptomycin Monitoring Note    Rock Maciel Jr. is a 79 y.o. male who is on day 8 of pharmacy to dose vancomycin for Bone and/or Joint Infection.    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   HD planned likely for MWF per the nephrologist note 3/14    Results from last 7 days   Lab Units 03/19/25  0527 03/18/25  0531 03/17/25  0552   CREATININE mg/dL 3.64* 4.92* 6.32*   WBC 10*3/mm3 9.78 10.37 12.90*     Assessment/Plan    Last ck 3/16 23    Current Daptomycin Dose: 800 mg once. (Intermittent - plan is to give 8mg/kg after each HD, pt back to room after HD )          Thank you for involving pharmacy in this patient's care. Please contact pharmacy with any questions or concerns.       Bernice Mei [facility]  Clinical Pharmacist                    "

## 2025-03-19 NOTE — NURSING NOTE
HD WITHOUT INCIDENT OR C/O. TOLERATED WELL. REMOVED 2.5 L. NO MEDS ADMINISTERED. DRSG CURRENT, CDI. STABLE, NO C/O POST COMPLETION OF HD.

## 2025-03-19 NOTE — DISCHARGE SUMMARY
Patient Name: Rock Maciel Jr.  : 1945  MRN: [id number]    Date of Admission: 3/3/2025  Date of Discharge:  3/19/2025  Primary Care Physician: Denise Dickson, ZEINA      Chief Complaint:   Fall      Discharge Diagnoses     Active Hospital Problems    Diagnosis  POA    **Traumatic rhabdomyolysis [T79.6XXA]  Yes    Chronic heart failure with preserved ejection fraction (HFpEF) [I50.32]  Yes    MRSA (methicillin resistant Staphylococcus aureus) infection [A49.02]  Yes    Contusion of left knee [S80.02XA]  Yes    Acute osteomyelitis of right foot [M86.071]  Yes    Diabetic foot infection [E11.628, L08.9]  Yes    ATN (acute tubular necrosis) [N17.0]  Yes    Closed displaced fracture of left clavicle [S42.002A]  Yes    Pneumonia due to infectious organism [J18.9]  Yes    Acute kidney injury [N17.9]  Unknown    Persistent atrial fibrillation [I48.19]  Yes    Chronic anticoagulation [Z79.01]  Not Applicable    Stage 3b chronic kidney disease [N18.32]  Yes    Chronic diastolic (congestive) heart failure [I50.32]  Yes    KILLIAN (acute kidney injury) [N17.9]  Yes    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        Hospital Course     79 year old man who presented following a mechanical fall leading to rhabdomyolysis, left knee contusion with left knee effusion and meniscus tear, and a clavicular fracture. He was also found to have KILLIAN on CKD 4, pneumonia, osteomyelitis and abscess/cellulitis of the right foot.     Osteomyelitis and abscess of the right foot  -s/p I&D and partial 4th metatarsal excision on 3/10/25 by Dr. Mchugh and then  "additional debridement of nonviable necrotic tissue on 3/13/25.   -Infectious disease evaluated, was treated with IV  daptomycin and unasyn while hospitalized and discharged on renally dosed Augmentin and linezolid to complete 2 weeks of antibiotics with last day of antibiotics on 03/24/2025 per infectious disease recommendations.      KILLIAN on CKD 4  -Multifactorial secondary to rhabdomyolysis,rerenal insult related to diuretics, ATN  -Nephrology was consulted.  Patient underwent RIJ TDC placement on 3/11/2025 initiated on dialysis 03/12/2025.  -Continue outpatient dialysis per nephrology  -Will need to follow-up with vascular surgery in 4 to 6 weeks to discuss indwelling access for dialysis    Traumatic rhabdomyolysis  CK was 5033 on admission.  Resolved with IV  fluids.       Post procedural right apical pneumothorax  -Tiny.  Resolved on repeat chest xray     Pneumonia  -completed a course of zosyn while hospitalized        Left clavicular fracture  -orthopedic surgery evaluated with recommendations as below  \"Avoid forward flexion until further signs of consolidation/healing on radiographs   Sling as tolerated   PT   Ice Pain control \"       Left knee contusion with possible quad tendon tear  -MRI left knee showed with lateral aspect of quad tendon tear partial tearing without full thickness tear.   -Orthopedic surgery evaluated.  Conditions as below  \"knee immobilizer for the left knee. WBAT in knee immobilizer or hinged knee brace. Keep knee straight.  -discussed that this injury can be treated conservatively as he is extremely high risk for surgical intervention and its associated complications with infection and increased risk of morbidity. To remain in hinged knee brace or knee immobilizer at least 3 more weeks then begin ROM with PT. \"         Anemia of chronic disease/CKD  -hgb down to 7.3 03/14/2025.  Status post 1 unit of PRBC transfused.    -Hemoglobin remained stable around 8-9.  Recommend CBC in 1 week " to monitor hemoglobin.    Apical chest pain  Chronic afib  Continue diastolic heart failure  CAD  -Continue outpatient Plavix, statin, metoprolol  -Cardiology evaluated.  Chest pain atypical, not a great candidate for invasive workup.  Recommended outpatient follow-up in 1 month.    -  Type 2 diabetes  -hemoglobin A1c is 6.  -Decreased glipizide to 2.5 twice daily at discharge to decrease risk of hypoglycemia.  Continue outpatient linagliptin.  Sliding scale insulin, hypoglycemia protocol       Insomnia  -Nightly melatonin    At the time of discharge patient was told to take all medications as prescribed, keep all follow-up appointments, and call their doctor or return to the hospital with any worsening or concerning symptoms.           Day of Discharge     Subjective:  Patient laying in bed.  No acute events overnight.  Denies further episodes of of chest pain.  Overall feeling better.  Discussed with cardiology, no indication for invasive workup since no further episodes of chest pain and EKG with no ischemic changes, and troponin relatively stable compared to yesterday in the setting of ESRD.    Physical Exam:  Temp:  [97.5 °F (36.4 °C)-98 °F (36.7 °C)] 97.5 °F (36.4 °C)  Heart Rate:  [66-75] 72  Resp:  [16-18] 18  BP: (106-187)/(53-81) 149/53  Body mass index is 34.61 kg/m².  Physical Exam  General: Alert, laying in bed, not in distress, chronically ill-appearing  HEENT: Normocephalic, atraumatic,   CV: Regular rate and rhythm, no murmur, right upper chest HD catheter in place  Lungs: CTA anteriorly, no wheezing,  Abdomen: Soft, nontender, nondistended  Extremities: Bilateral lower extremity edema, status post left foot amputation. Right foot dressing in place   Consultants     Consult Orders (all) (From admission, onward)       Start     Ordered    03/18/25 0904  Inpatient Cardiology Consult  Once        Specialty:  Cardiology  Provider:  Luke Clancy MD    03/18/25 0905    03/10/25 1118  Inpatient  Vascular Surgery Consult  Once        Specialty:  Vascular Surgery  Provider:  Pasquale Garcia MD    03/10/25 1117    03/09/25 1048  Inpatient Cardiology Consult  Once        Specialty:  Cardiology  Provider:  Sanchez Mar III, MD    03/09/25 1048    03/07/25 0902  Inpatient Infectious Diseases Consult  Once        Specialty:  Infectious Diseases  Provider:  Nicolasa Denny MD    03/07/25 0902    03/06/25 0947  Inpatient Podiatry Consult  Once        Specialty:  Podiatry  Provider:  Jimenez Mchugh DPM    03/06/25 0946    03/05/25 1141  Inpatient Urology Consult  Once        Specialty:  Urology  Provider:  Prosper Cornejo MD    03/05/25 1141    03/05/25 0541  Inpatient Spiritual Care Consult  Once        Comments: Pt requesting [facility] for breann Wednesday.   Provider:  (Not yet assigned)    03/05/25 0542    03/04/25 0702  Inpatient Orthopedic Surgery Consult  IN AM        Specialty:  Orthopedic Surgery  Provider:  Camilo Hunter MD    03/03/25 1741    03/04/25 0304  Inpatient Case Management  Consult  Once        Provider:  (Not yet assigned)    03/04/25 0322    03/03/25 1650  [facility] (on-call MD unless specified) Details  Once        Specialty:  Hospitalist  Provider:  Bishop Chakraborty MD    03/03/25 1649    03/03/25 1526  Nephrology (on -call MD unless specified)  Once        Specialty:  Nephrology  Provider:  Les Talavera MD    03/03/25 1525    03/03/25 1519  Nephrology (on -call MD unless specified)  Once,   Status:  Canceled        Specialty:  Nephrology  Provider:  (Not yet assigned)    03/03/25 1518                  Procedures     DEBRIDEMENT FOOT, RIGHT      Imaging Results (All)       Procedure Component Value Units Date/Time    XR Chest 2 View [[id number]] Collected: 03/12/25 1332     Updated: 03/12/25 1340    Narrative:      XR CHEST 2 VW-     HISTORY: Male who is 79 years-old, pneumothorax, follow-up     TECHNIQUE: Frontal and lateral views of the chest     COMPARISON:  3/12/2025     FINDINGS: Right-sided central venous catheter appears stable. Sternotomy  wires are noted. Heart is enlarged. Pulmonary vasculature is congested.  Bilateral pulmonary opacities appear similar to prior exam. Small left  pleural effusion is apparent. No pneumothorax. No acute osseous process.       Impression:      No significant change.     This report was finalized on 3/12/2025 1:37 PM by Dr. Azam Alaniz M.D on Workstation: [id number]       XR Chest 1 View [[id number]] Collected: 03/12/25 1157     Updated: 03/12/25 1204    Narrative:      XR CHEST 1 VW-     HISTORY: Male who is 79 years-old, pneumothorax, follow-up     TECHNIQUE: Frontal views of the chest     COMPARISON: 3/11/2025     FINDINGS: Right-sided central venous catheter appears stable. Sternotomy  wires are noted. The heart is enlarged. Pulmonary vasculature appears  mildly congested. Some hazy opacity at the left midlung may represent  infiltrate, follow-up advised. Minimal left pleural effusion is  suggested. No definite pneumothorax. Right hemidiaphragm remains  elevated. No acute osseous process.       Impression:      As described.     This report was finalized on 3/12/2025 12:00 PM by Dr. Azam Alaniz M.D on Workstation: [id number]       XR Chest Post CVA Port [[id number]] Collected: 03/11/25 1737     Updated: 03/11/25 1746    Narrative:      XR CHEST POST CVA PORT-     CLINICAL HISTORY:  s/p tunneled catheter placement, eval for  pneumothorax; T79.6XXA-Traumatic ischemia of muscle, initial encounter;  N17.9-Acute kidney failure, unspecified; N18.9-Chronic kidney disease,  unspecified; A41.9-Sepsis, unspecified organism; J18.9-Pneumonia,  unspecified organism; R73.9-Hyperglycemia, unspecified; D64.9-Anemia,  unspecified; S42.032A-Displaced fracture of lateral end of left  clavicle, init     COMPARISON: 3/9/2025 a single portable view of the chest was obtained.  The study is hampered by the patient's body habitus. A  dual-lumen  central line is in place with the tip at the junction of the superior  vena cava and right atrium. There is prominence of the pulmonary  interstitium and lesser extent vasculature similar in appearance as  compared to 3/9/2025. There is no evidence of consolidation or effusion.  There is a lucency appreciated at the right lung apex suspicious for a  very small pneumothorax.     The above information was called to the patient's nurse. The patient's  nurse is to relay the information to the clinical service.           This report was finalized on 3/11/2025 5:43 PM by Dr. Bishop Tran M.D  on Workstation: [id number]       FL C Arm During Surgery [[id number]] Resulted: 03/11/25 1732     Updated: 03/11/25 1732    Narrative:      This procedure was auto-finalized with no dictation required.    XR Foot 3+ View Right [[id number]] Collected: 03/10/25 1126     Updated: 03/10/25 1132    Narrative:      XR FOOT 3+ VW RIGHT-     INDICATIONS: Postoperative evaluation     TECHNIQUE: 3 views of the right foot     COMPARISON: 3/6/2025     FINDINGS:     Since the prior exam, partial resection of the fourth metatarsal has  occurred at the level of the mid metatarsal. The bones otherwise appear  stable. Surgical soft tissue gas is present, and persistent soft tissue  swelling is noted. Arterial calcifications are again seen.       Impression:         Postsurgical changes.           This report was finalized on 3/10/2025 11:29 AM by Dr. Azam Alaniz M.D on Workstation: [id number]       XR Chest 1 View [[id number]] Collected: 03/09/25 1813     Updated: 03/09/25 1819    Narrative:      XR CHEST 1 VW-     DATE OF EXAM: 3/9/2025 5:26 PM     INDICATION: Evaluate for pulmonary edema.     COMPARISON: Radiographs 3/3/2025, 11/14/2024, 11/10/2024. CT abdomen and  pelvis 11/25/2024. CT chest 5/7/2024..     TECHNIQUE: Portable upright AP views of the chest were obtained.     FINDINGS:  Lordotic positioning. Overlying artifacts.  Stable sternotomy wires,  postoperative changes from CABG, and prosthetic heart valve. Chronic  elevation of the right hemidiaphragm with right perihilar and right  basilar subsegmental atelectasis and/or scarring. Indistinct pulmonary  vasculature with hazy groundglass opacities and interstitial thickening  in both lungs, suggesting mild pulmonary edema or atypical pneumonia.  Blunting of the left costophrenic angle, which could represent a small  pleural effusion. Calcified remnants of prior granulomatous disease. No  pneumothorax. Stable cardiomegaly, likely accentuated by technique.  Incomplete evaluated chronic changes in both shoulders. No acute osseous  abnormality is identified.       Impression:         1. Stable cardiomegaly with indistinct pulmonary vasculature and hazy  groundglass opacities and interstitial thickening throughout both lungs,  suggesting mild pulmonary edema or atypical pneumonia, with a suspected  small left pleural effusion.  2. Chronic elevation of the right hemidiaphragm with right perihilar and  right basilar subsegmental atelectasis and/or scarring.     This report was finalized on 3/9/2025 6:16 PM by Alverto Jones MD on  Workstation: [id number]       MRI Knee Left Without Contrast [[id number]] Collected: 03/08/25 1735     Updated: 03/08/25 1755    Narrative:      MRI KNEE LEFT  WO CONTRAST-     Date of Exam: 3/8/2025 3:09 PM     Indication: Increased pain and swelling status post fall. Equivocal exam  regarding possible quadriceps tendon tear.     Comparison: Radiographs 3/7/2025.     Technique: Multiplanar, multisequence MR imaging of the knee was  performed without contrast.     FINDINGS:     Soft tissues: Partially imaged moderate to large joint effusion with  diffuse synovial thickening and/or intra-articular debris. Trace fluid  extends into a thin popliteal cyst, measuring 7 cm craniocaudal. Patchy  soft tissue edema, greatest anteriorly. Multiple postoperative foci  of  susceptibility artifact in the soft tissues at the anterior distal thigh  and knee. No solid soft tissue mass.     Menisci: Complex tear of the medial meniscus body and posterior horn  with a 7 mm meniscal body flap flipped into the medial femoral gutter.  Longitudinal horizontal oblique tear of the lateral meniscus posterior  body and posterior horn extending to the inferior articular surface with  a full-thickness or high-grade near full-thickness radial tear of the  lateral meniscus posterior root.     Cruciate Ligaments: The ACL is intact.  The PCL is intact.     Collateral ligaments: The MCL is intact.  The LCL complex is intact.     Extensor Mechanism: Evidence of prior distal quadriceps tendon repair  with partial ossification of the distal quadriceps tendon. Complex  recurrent moderate to high-grade partial-thickness tear/avulsion of the  distal quadriceps tendon with up to an estimated 2.5 cm proximal  retraction of the torn deep medial tendon fibers. Likely chronic partial  ossification of the otherwise intact proximal patellar tendon. Severe  diffuse muscular fatty atrophy.     Articular cartilage: Diffuse grade II-III chondromalacia in the  patellofemoral compartment with multifocal full-thickness/near  full-thickness chondral fissuring at the lateral patellar facet and  lateral trochlea. Diffuse grade II-III chondromalacia in the medial  compartment with focal full-thickness or near full-thickness chondral  fissuring at the medial weightbearing portion of the medial femoral  condyle and medial tibial plateau and a small 6 mm suspected unstable  osteochondral fragment at the medial weightbearing portion of the medial  femoral condyle. Diffuse grade II chondromalacia in the lateral  compartment.     Bones: Suspected small 6 mm chronic unstable osteochondral fragment at  the medial weightbearing portion of the medial femoral condyle. Tiny  subchondral cystic changes at the patella and trochlea related  overlying  chondromalacia. Postoperative changes in the patella. No acute fracture.  No concerning bone marrow lesions or marrow replacing process.       Impression:         1. Evidence of prior distal quadriceps tendon repair with a complex  recurrent moderate to high-grade partial-thickness tear/avulsion with  mild proximal retraction and partial ossification of the distal  quadriceps tendon. Recommend comparison to prior outside imaging if  available. Severe diffuse muscular fatty atrophy.  2. Tricompartmental chondromalacia/DJD, most severe in the medial and  patellofemoral compartments, with a small 6 mm suspected unstable  osteochondral fragment of the medial weightbearing portion of the medial  femoral condyle.  3. Complex tear of the medial meniscus body and posterior horn with a 7  mm meniscal body flap flipped into the medial femoral gutter.  4. Longitudinal horizontal oblique tear of the lateral meniscus  posterior body and posterior horn extending to the intra-articular  surface with a full-thickness or high-grade near full-thickness radial  tear of the lateral meniscus posterior horn.  5. Partially imaged moderate to large joint effusion with diffuse  synovial thickening and/or intra-articular debris and a thin popliteal  cyst           This report was finalized on 3/8/2025 5:52 PM by Alverto Jones MD on  Workstation: [id number]       XR Knee 1 or 2 View Left [[id number]] Collected: 03/07/25 1435     Updated: 03/07/25 1441    Narrative:      XR KNEE 1 OR 2 VW LEFT-     DATE OF EXAM: 3/7/2025 1:51 PM     INDICATION: Knee pain and swelling status post fall 5 days ago.     COMPARISON: Left femur radiographs 5/6/2019.     TECHNIQUE: AP and lateral views of the knee were obtained.     FINDINGS:  The lateral views are limited by oblique patient positioning. Diffuse  osteopenia. Ossifications superior to the patella could represent  osteochondral bodies in the suprapatellar joint space, partial  ossification  of the quadriceps tendon, and/or avulsion fragments from  the superior patella. Small inferior patellar enthesophyte. Severe DJD  in the medial compartment with associated mild varus angulation. Lateral  and patellofemoral joint spaces are fairly well-maintained. Moderate to  large knee joint effusion. Severe vascular calcifications.       Impression:         1. Diffuse osteopenia and moderate to large knee joint effusion with  ossification superior to the patella that could represent osteochondral  bodies suprapatellar joint space, partial ossification of the quadriceps  tendon, and/or avulsion fracture fragments from the superior patella.  Recommend correlating for quadriceps tendon integrity.  3. Severe DJD in the medial compartment.     This report was finalized on 3/7/2025 2:38 PM by Alverto Jones MD on  Workstation: [id number]       MRI Foot Right Without Contrast [[id number]] Collected: 03/07/25 0929     Updated: 03/07/25 1305    Narrative:      MRI RIGHT FOREFOOT WITHOUT CONTRAST     HISTORY: Ulcer. Possible abscess.     TECHNIQUE: MRI includes axial T1, STIR as well as coronal T1, T2  fat-sat, and sagittal PD  fat-saturated sequences through the forefoot.     COMPARISON: Foot x-rays 03/06/2025.     FINDINGS: Lateral forefoot soft tissue wound is centered lateral to the  fifth metatarsal and communicates with a sinus tract that contains fluid  signal consistent with abscess formation that surrounds the distal shaft  and head of the fourth metatarsal in patient with previous amputation of  the fourth toe at the fourth MTP joint. Abscess contains several bubbles  of soft tissue air and there is also air within the surrounding soft  tissues consistent with infection. The fluid signal surrounding the  distal fourth metatarsal shaft measures approximately 3.5 cm in height  by 2.3 cm transverse and extends 3.5 cm in AP dimension. There are  bubbles of air within the head of the fourth metatarsal associated  with  areas of cortical bone loss and osteomyelitis of the distal fourth  metatarsal over the distal shaft and head.     There is a chronic thin linear band of calcification extending distal to  the partially amputated fifth metatarsal. Abscess contacts this  calcification which appears to be infected though there is no evidence  for infection of the cancellous bone of the remaining proximal fifth  metatarsal.     No evidence for osteomyelitis of the third through fifth metatarsals or  toes. There is bony fusion at the tarsometatarsal joints, cuneiform  joints. Generalized muscular atrophy with fat replacement. Myositis  surrounding the distal fourth metatarsal. There is generalized soft  tissue swelling with edema and swelling in subcutaneous fat about the  forefoot consistent with cellulitis.       Impression:      1. Soft tissue wound lateral to the fifth metatarsal communicates with  fluid signal/abscess that contains bubbles of air and surrounds the  distal fourth metatarsal where there is osteomyelitis. Fluid signal and  suspected abscess also contacts thin band of dystrophic calcification  extending distal to the amputated segment of the fifth metatarsal  without evidence for osteomyelitis of the cancellous bone of the  remaining proximal fifth metatarsal.  2. Generalized forefoot cellulitis.  3. Chronic muscular atrophy consistent with chronic neuropathy.     This report was finalized on 3/7/2025 1:02 PM by Freddy Guzman M.D on  Workstation: [id number]       XR Foot 3+ View Right [[id number]] Collected: 03/06/25 2055     Updated: 03/06/25 2103    Narrative:      XR FOOT 3+ VW RIGHT-     INDICATIONS: Ulcer, check for osteomyelitis     TECHNIQUE: 4 views of the right foot     COMPARISON: None available     FINDINGS:     The fifth ray is mostly absent, with only residual portion of the base  of the fifth metatarsal apparent, presumably as a result of prior  partial amputation, but potentially sequela of  erosion. Likewise, no  left fourth toe is seen. Soft tissue gas is seen around the head of the  fourth metatarsal, compatible with gas producing infection. Some  portions of the cortex of the head of the fourth metatarsal are  indistinct, suspicious for osteomyelitis, suggest correlation with MRI  or bone scan. No other evidence of erosions. No acute fractures are  noted. Soft tissue swelling especially at the mid to distal foot may  reflect cellulitis. Arterial calcifications are conspicuous. Pes planus  configuration is apparent.       Impression:         Suspicion for osteomyelitis involving the fourth metatarsal head  (consider further evaluation with MRI or bone scan) with surrounding  cellulitis, as well as soft tissue gas suspicious for gas producing  infection.     Discussed by telephone with patient's nurse, Angela, at time of  interpretation, 2058, 3/6/2025.           This report was finalized on 3/6/2025 9:00 PM by Dr. Azam Alaniz M.D on Workstation: [id number]       XR Shoulder 2+ View Left [[id number]] Collected: 03/03/25 1523     Updated: 03/04/25 0719    Narrative:      XR SHOULDER 2+ VW LEFT-, XR HUMERUS LEFT-     HISTORY: 79-year-old male status post shoulder pain following a fall.     FINDINGS:  There is a comminuted fracture of the distal clavicle with 1.5 cm caudal  displacement of the clavicular shaft. 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 There is significant narrowing of the subacromial space.  There is no shoulder dislocation and the left humerus appears  unremarkable.     This report was finalized on 3/4/2025 7:15 AM by Dr. Latrice Rodriguez M.D on  Workstation: [id number]       XR Humerus Left [[id number]] Collected: 03/03/25 1523     Updated: 03/04/25 0719    Narrative:      XR SHOULDER 2+ VW LEFT-, XR HUMERUS LEFT-     HISTORY: 79-year-old male status post shoulder pain following a fall.     FINDINGS:  There is a  comminuted fracture of the distal clavicle with 1.5 cm caudal  displacement of the clavicular shaft. 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 There is significant narrowing of the subacromial space.  There is no shoulder dislocation and the left humerus appears  unremarkable.     This report was finalized on 3/4/2025 7:15 AM by Dr. Latrice Rodriguez M.D on  Workstation: [id number]       US Renal Bilateral [[id number]] Collected: 03/03/25 1704     Updated: 03/03/25 1708    Narrative:      US RENAL BILATERAL-3/3/2025     HISTORY: Renal insufficiency.     Right kidney measures 8.7 cm in length. Left kidney measures 10.1 cm in  length. Urinary bladder is well distended. There appears to be bladder  diverticulum arising posteriorly.     No solid renal masses, stones or hydronephrosis is seen.       Impression:      1. Unremarkable bilateral renal ultrasound.  2. Urinary bladder diverticulum.        This report was finalized on 3/3/2025 5:05 PM by Dr. Marquez Olmstead M.D on Workstation: [id number]       XR Chest 1 View [[id number]] Collected: 03/03/25 1652     Updated: 03/03/25 1658    Narrative:      XR CHEST 1 VW-3/3/2025     HISTORY: Leukocytosis.     Heart size is mildly enlarged. There is some mild atelectasis or  pneumonia in the right lung base. Calcified plaques are seen in the left  hemithorax. Sternotomy wires are seen.       Impression:      1. Mild cardiomegaly.  2. Mild patchy atelectasis or pneumonia in the right lung base.        This report was finalized on 3/3/2025 4:55 PM by Dr. Marquez Olmstead M.D on Workstation: [id number]       CT Head Without Contrast [[id number]] Collected: 03/03/25 1550     Updated: 03/03/25 1603    Narrative:      CT HEAD WO CONTRAST-     INDICATIONS: Head injury     TECHNIQUE: Radiation dose reduction techniques were utilized, including  automated exposure control and exposure modulation based on body  size.  Noncontrast head CT     COMPARISON: 1/23/2018     FINDINGS:           No acute intracranial hemorrhage, midline shift or mass effect. No acute  territorial infarct is identified.       Mild periventricular hypodensities suggest chronic small vessel ischemic  change in a patient this age.     Arterial calcifications are seen at the base of the brain.     Ventricles, cisterns, cerebral sulci are unremarkable for patient age.     Right mastoid air cells are opacified, and right middle ear is partly  opacified. Small paranasal sinus mucosal thickening is present. The  visualized paranasal sinuses, orbits, mastoid air cells are otherwise  unremarkable.          Impression:         No acute intracranial hemorrhage or hydrocephalus. If there is further  clinical concern, MRI could be considered for further evaluation.     This report was finalized on 3/3/2025 4:00 PM by Dr. Azam Alaniz M.D on Workstation: [id number]             Results for orders placed during the hospital encounter of 03/17/21    Duplex venous lower extremity bilateral CAR    Interpretation Summary  · There was deep venous valvular incompetence noted in the right common femoral and popliteal.  · There was superficial venous valvular incompetence noted in the right greater saphenous (below knee).  · Chronic right lower extremity superficial thrombophlebitis noted in the greater saphenous (below knee).  · There was superficial venous valvular incompetence noted in the left greater saphenous (below knee).  · Chronic left lower extremity superficial thrombophlebitis noted in the small saphenous.  · All other veins appeared normal bilaterally.    Results for orders placed during the hospital encounter of 10/03/24    Adult Transthoracic Echo Complete W/ Cont if Necessary Per Protocol    Interpretation Summary    Left ventricular ejection fraction appears to be 61 - 65%.    Left ventricular diastolic function was indeterminate.    The left atrial  "cavity is moderately dilated.    Left atrial volume is moderately increased.    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    Estimated right ventricular systolic pressure from tricuspid regurgitation is moderately elevated (45-55 mmHg).    Pertinent Labs     Results from last 7 days   Lab Units 03/19/25 0527 03/18/25 0531 03/17/25 0552 03/16/25 0431   WBC 10*3/mm3 9.78 10.37 12.90* 13.87*   HEMOGLOBIN g/dL 9.0* 8.8* 8.9* 9.1*   PLATELETS 10*3/mm3 320 319 326 304     Results from last 7 days   Lab Units 03/19/25 0527 03/18/25 0531 03/17/25 0552 03/16/25 0431   SODIUM mmol/L 133* 132* 136 135*   POTASSIUM mmol/L 3.5 3.8 4.6 4.3   CHLORIDE mmol/L 98 98 100 101   CO2 mmol/L 25.9 20.9* 20.1* 21.0*   BUN mg/dL 22 34* 48* 41*   CREATININE mg/dL 3.64* 4.92* 6.32* 5.42*   GLUCOSE mg/dL 128* 104* 104* 118*   Estimated Creatinine Clearance: 21.9 mL/min (A) (by C-G formula based on SCr of 3.64 mg/dL (H)).  Results from last 7 days   Lab Units 03/14/25  0518   ALBUMIN g/dL 2.4*     Results from last 7 days   Lab Units 03/19/25 0527 03/18/25 0531 03/17/25 0552 03/16/25  0431 03/15/25  0439 03/14/25  0518   CALCIUM mg/dL 8.4* 8.1* 8.2* 7.8*   < > 7.6*   ALBUMIN g/dL  --   --   --   --   --  2.4*   MAGNESIUM mg/dL 2.1 2.2 2.4 2.3  --   --    PHOSPHORUS mg/dL 3.8 5.1* 7.3* 5.7*  --  6.5*    < > = values in this interval not displayed.       Results from last 7 days   Lab Units 03/19/25 0527 03/18/25  0948 03/18/25 0531 03/16/25 0431   CK TOTAL U/L  --   --   --  23   HSTROP T ng/L 386* 314* 293*  --            Invalid input(s): \"LDLCALC\"          Test Results Pending at Discharge       Discharge Details        Discharge Medications        New Medications        Instructions Start Date   amoxicillin-clavulanate 500-125 MG per tablet  Commonly known as: Augmentin   500 mg, Oral, Daily, take after HD on dialysis days   Start Date: March 20, 2025     insulin lispro 100 UNIT/ML injection  Commonly known as: " HUMALOG/ADMELOG   2-9 Units, Subcutaneous, 4 Times Daily Before Meals & Nightly      linezolid 600 MG tablet  Commonly known as: ZYVOX   600 mg, Oral, 2 Times Daily   Start Date: March 20, 2025     melatonin 5 MG tablet tablet   5 mg, Oral, Nightly      ondansetron ODT 4 MG disintegrating tablet  Commonly known as: ZOFRAN-ODT   4 mg, Oral, Every 6 Hours PRN      pantoprazole 40 MG EC tablet  Commonly known as: PROTONIX   40 mg, Oral, Every Early Morning   Start Date: March 20, 2025     sevelamer 800 MG tablet  Commonly known as: RENVELA   800 mg, Oral, 3 Times Daily With Meals             Changes to Medications        Instructions Start Date   bumetanide 2 MG tablet  Commonly known as: BUMEX  What changed:   medication strength  how much to take  when to take this   2 mg, Oral, 3 Times Daily      glipizide 5 MG tablet  Commonly known as: GLUCOTROL  What changed: how much to take   2.5 mg, Oral, 2 Times Daily Before Meals      hydrALAZINE 10 MG tablet  Commonly known as: APRESOLINE  What changed:   medication strength  how much to take   10 mg, Oral, 3 Times Daily             Continue These Medications        Instructions Start Date   Accu-Chek Keshia Plus test strip  Generic drug: glucose blood   USE TO TEST BLOOD SUGAR    TWICE DAILY FOR DIABETES      acetaminophen 325 MG tablet  Commonly known as: TYLENOL   650 mg, Oral, Every 6 Hours PRN      amiodarone 200 MG tablet  Commonly known as: PACERONE   Take 1 tablet by mouth Daily.      atorvastatin 20 MG tablet  Commonly known as: LIPITOR   20 mg, Oral, Nightly, NEED TO SEE PROVIDER FOR FUTURE REFILLS.      cholecalciferol 25 MCG (1000 UT) tablet  Commonly known as: VITAMIN D3   1,000 Units, Oral, Every 7 Days      clopidogrel 75 MG tablet  Commonly known as: PLAVIX   75 mg, Oral, Daily      Eliquis 2.5 MG tablet tablet  Generic drug: apixaban   2.5 mg, Oral, 2 Times Daily      metoprolol succinate XL 25 MG 24 hr tablet  Commonly known as: TOPROL-XL   25 mg, Oral,  Daily      nitroglycerin 0.4 MG SL tablet  Commonly known as: NITROSTAT   0.4 mg, Sublingual, Every 5 Minutes PRN, Take no more than 3 doses in 15 minutes.      terazosin 2 MG capsule  Commonly known as: HYTRIN   Take 1 capsule by mouth Every Night.      Tradjenta 5 MG tablet tablet  Generic drug: linagliptin   Take 1 tablet by mouth Daily.      vitamin C 250 MG tablet  Commonly known as: ASCORBIC ACID   250 mg, Daily      Zinc 50 MG tablet                Allergies   Allergen Reactions    Ceclor [Cefaclor] Rash     Rash in his 50s; he tolerated piperacillin-tazobactam in March 2020       Discharge Disposition:  Skilled Nursing Facility (DC - External)      Discharge Diet:  Diet Order   Procedures    Diet: Renal; Low Potassium, Low Phosphorus, Low Sodium (2-3g); Fluid Consistency: Thin (IDDSI 0)       Discharge Activity:       CODE STATUS:    Code Status and Medical Interventions: CPR (Attempt to Resuscitate); Full Support   Ordered at: 03/03/25 3453     Code Status (Patient has no pulse and is not breathing):    CPR (Attempt to Resuscitate)     Medical Interventions (Patient has pulse or is breathing):    Full Support     Level Of Support Discussed With:    Patient       Future Appointments   Date Time Provider Department Center   4/8/2025  1:00 PM Mary Beth Setel APRN [facility]   4/14/2025  1:00 PM [facility] RM 2 BH [facility]   4/30/2025  2:30 PM Jeaneth Bonds MD [facility]      Contact information for follow-up providers       Jeaneth Bonds MD Follow up in 6 week(s).    Specialty: Vascular Surgery  Contact information:  [address] [id number] [phone]               Denise Dickson APRN .    Specialties: Nurse Practitioner, Internal Medicine, Family Medicine  Contact information:  [address] [facility] [id number] [phone]               Jo Toney MD. Schedule an appointment as soon as possible for a visit in 1 month(s).    Specialty:  Cardiology  Contact information:  [address]  [facility] [id number] [phone]               John Mcgee MD. Schedule an appointment as soon as possible for a visit in 1 month(s).    Specialty: Orthopedic Surgery  Contact information:  [address]  [address]  [facility] [id number] [phone]                       Contact information for after-discharge care       Destination       [facility] .    Service: Skilled Nursing  Contact information:  [address] [facility] [address] [phone]                                   Time Spent on Discharge:  Greater than 30 minutes      Magdalena Diamond MD  [facility]  03/19/25  11:27 EDT

## 2025-03-19 NOTE — PROGRESS NOTES
Infectious Diseases Progress Note    Nicolasa Denny MD     [facility]  [place]: 16 days  Patient Identification:  Name: Rock Maciel Jr.  Age: 79 y.o.  Sex: male  :  1945  MRN: [id number]         Primary Care Physician: Denise Dickson, ZEINA        Subjective: feeling better and denies any fever and chills  Interval History: See consultation note.    Objective:    Scheduled Meds:amiodarone, 200 mg, Oral, Daily  ampicillin-sulbactam, 3 g, Intravenous, Q24H  apixaban, 2.5 mg, Oral, BID  vitamin C, 250 mg, Oral, Daily  atorvastatin, 20 mg, Oral, Nightly  bumetanide, 2 mg, Oral, TID  cholecalciferol, 1,000 Units, Oral, Daily  clopidogrel, 75 mg, Oral, Daily  Pharmacy to Dose daptomycin (CUBICIN), , Not Applicable, Daily  epoetin vince/vince-epbx, 10,000 Units, Intravenous, Once per day on   hydrALAZINE, 10 mg, Oral, TID  insulin glargine, 5 Units, Subcutaneous, Nightly  insulin lispro, 2-9 Units, Subcutaneous, 4x Daily AC & at Bedtime  insulin lispro, 6 Units, Subcutaneous, TID With Meals  Lidocaine, 1 patch, Transdermal, Q24H  linagliptin, 5 mg, Oral, Daily  melatonin, 5 mg, Oral, Nightly  Menthol-Zinc Oxide, 1 Application, Topical, Q12H  metoprolol succinate XL, 25 mg, Oral, Daily  pantoprazole, 40 mg, Oral, Q AM  sevelamer, 1,600 mg, Oral, TID With Meals  sodium chloride, 3 mL, Intravenous, Q12H  tamsulosin, 0.4 mg, Oral, Daily  zolpidem, 2.5 mg, Oral, Nightly      Continuous Infusions:Pharmacy Consult - Pharmacy to dose,         Vital signs in last 24 hours:  Temp:  [97.5 °F (36.4 °C)-98 °F (36.7 °C)] 97.5 °F (36.4 °C)  Heart Rate:  [66-75] 72  Resp:  [16-18] 18  BP: (106-187)/(53-81) 149/53    Intake/Output:    Intake/Output Summary (Last 24 hours) at 3/19/2025 0925  Last data filed at 3/18/2025 1710  Gross per 24 hour   Intake --   Output 3000 ml   Net -3000 ml         Exam:  /53 (BP Location: Right arm, Patient Position: Lying)   Pulse 72   Temp 97.5 °F (36.4  "°C) (Oral)   Resp 18   Ht 185.4 cm (73\")   Wt 119 kg (262 lb 5.6 oz)   SpO2 97%   BMI 34.61 kg/m²   Patient is examined using the personal protective equipment as per guidelines from infection control for this particular patient as enacted.  Hand washing was performed before and after patient interaction.  General Appearance:  Lethargic but interactive                          Head:  Left posterior occipital scalp area laceration dressed.                           Eyes:    PERRL, pale conjunctiva                         Throat:   Lips, tongue, gums normal; oral mucosa pink and moist                           Neck:   Supple, symmetrical, trachea midline, no JVD                         Lungs:    Clear to auscultation bilaterally, respirations unlabored                 Chest Wall:    No tenderness or deformity left clavicular tenderness noted, right IJ tunneled catheter in place.                          Heart:  S1-S2 irregular                  Abdomen:   Soft nontender                 Extremities: Right foot dressed after incision and drainage of multiple compartments and partial resection of the fourth metatarsal.                  Neurologic: Alert and oriented x 3       Data Review:    I reviewed the patient's new clinical results.  Results from last 7 days   Lab Units 03/19/25 0527 03/18/25 0531 03/17/25  0552 03/16/25  0431 03/15/25  0439 03/14/25  0518 03/13/25  0452   WBC 10*3/mm3 9.78 10.37 12.90* 13.87* 10.97* 9.21 8.65   HEMOGLOBIN g/dL 9.0* 8.8* 8.9* 9.1* 9.0* 7.3* 7.7*   PLATELETS 10*3/mm3 320 319 326 304 293 268 292     Results from last 7 days   Lab Units 03/19/25 0527 03/18/25 0531 03/17/25  0552 03/16/25  0431 03/15/25  0439 03/14/25  0518 03/13/25  0452   SODIUM mmol/L 133* 132* 136 135* 134* 138 139   POTASSIUM mmol/L 3.5 3.8 4.6 4.3 4.1 4.1 4.5   CHLORIDE mmol/L 98 98 100 101 100 103 103   CO2 mmol/L 25.9 20.9* 20.1* 21.0* 22.4 21.0* 20.0*   BUN mg/dL 22 34* 48* 41* 33* 64* 84*   CREATININE " mg/dL 3.64* 4.92* 6.32* 5.42* 3.66* 5.76* 6.59*   CALCIUM mg/dL 8.4* 8.1* 8.2* 7.8* 8.0* 7.6* 7.8*   GLUCOSE mg/dL 128* 104* 104* 118* 145* 106* 130*     Microbiology Results (last 10 days)       Procedure Component Value - Date/Time    Anaerobic Culture - Wound, Foot, Right [[id number]]  (Normal) Collected: 03/10/25 0909    Lab Status: Final result Specimen: Wound from Foot, Right Updated: 03/15/25 0658     Anaerobic Culture No anaerobes isolated at 5 days    Wound Culture - Wound, Foot, Right [[id number]]  (Abnormal)  (Susceptibility) Collected: 03/10/25 0909    Lab Status: Final result Specimen: Wound from Foot, Right Updated: 03/13/25 0643     Wound Culture Scant growth (1+) Staphylococcus aureus, MRSA     Comment:   Methicillin resistant Staphylococcus aureus, Patient may be an isolation risk.        Gram Stain Rare (1+) Gram positive cocci      Rare (1+) WBCs seen    Narrative:            Susceptibility        Staphylococcus aureus, MRSA      ABBIE      Clindamycin Susceptible      Erythromycin Resistant      Oxacillin Resistant      Rifampin Susceptible      Tetracycline Susceptible      Trimethoprim + Sulfamethoxazole Resistant      Vancomycin Susceptible                                     Assessment:    Traumatic rhabdomyolysis    Arteriosclerosis of coronary artery    Essential hypertension    Type 2 diabetes mellitus with circulatory disorder, without long-term current use of insulin    Hyperlipidemia    Charcot-Davida-Tooth disease-like deformity of foot    Fall    S/P CABG x 2    KILLIAN (acute kidney injury)    Chronic diastolic (congestive) heart failure    Stage 3b chronic kidney disease    Chronic anticoagulation    Persistent atrial fibrillation    Closed displaced fracture of left clavicle    Pneumonia due to infectious organism    ATN (acute tubular necrosis)    Contusion of left knee    Acute osteomyelitis of right foot    Diabetic foot infection    MRSA (methicillin resistant Staphylococcus aureus)  infection    Acute kidney injury  1-probable systemic illness due to  2-evolving ongoing infection with abscess of the right foot resulting in generalized weakness and  3-subsequent fall and fracture of left clavicle and left knee discomfort  4-diabetes mellitus  5-history of gout  6-sleep apnea  7-obesity  8-hypertension  9-severe anemia  10-acute on chronic renal failure - ESRD to start dialysis -3/12/2025  11-prior multiple diabetic foot infections and surgeries.  12-status post right IJ tunneled catheter with small post op pneumothorax     Recommendations/Discussions:  See my discussion and recommendation on 3/7/2025 and 3/15/2025.  Pathology report from the operative sample taken on 3/10/2025 reveals that resected bone margin is free of acute infection and inflammation.  In this scenario patient would need 2 weeks of antibiotic therapy from 3/10/2025.  Continue with IV antibiotics while he is here and when he is considered ready to be discharged prior to 3/24/2025 his antibiotic regimen can be switched to Augmentin and linezolid to complete the treatment after last dose on 3/24/2025.  Side effects of antibiotic therapy discussed with the patient and he understands the risks but also understands the need for antibiotic therapy and wants to proceed.  Nicolasa Denny MD  3/19/2025  09:25 EDT    Parts of this note may be an electronic transcription/translation of spoken language to printed text using the Dragon dictation system.

## 2025-03-19 NOTE — PROGRESS NOTES
[facility]  Cardiology Progress Note    Patient Identification:  Name: Rock Maciel Jr.  Age: 79 y.o.  Sex: male  :  1945  MRN: [id number]                 Follow Up / Chief Complaint: Chest pain    Interval History:       Subjective:  No more episodes of chest pain    Objective:    Past Medical History:  Past Medical History:   Diagnosis Date    Allergic rhinitis     Bronchitis     Coronary artery disease     Diabetes mellitus     DM (diabetes mellitus)     Encounter for annual health examination 2014    Annual Health Assessment    Gout     Hiatal hernia     History of MRSA infection     RIGHT FOOT     Hyperlipidemia     Hypertension     Murmur     Pneumothorax on right     Sleep apnea     pt wears CPAP at night    Wellness examination 2015    Annual Wellness Visit     Past Surgical History:  Past Surgical History:   Procedure Laterality Date    ARTERY SURGERY Bilateral     carotid    BONE EXCISION LEG Right 3/10/2025    Procedure: BONE EXCISION LOWER EXTERMITY, RIGHT;  Surgeon: Jimenez Mchugh DPM;  Location: [facility] OR;  Service: Podiatry;  Laterality: Right;    CARDIAC CATHETERIZATION N/A 2018    Procedure: Left Heart Cath;  Surgeon: Jo Toney MD;  [facility];  Service: Cardiovascular    CARDIAC CATHETERIZATION N/A 2018    Procedure: Coronary angiography;  Surgeon: Jo Toney MD;  [facility];  Service: Cardiovascular    CAROTID ENDARTERECTOMY Bilateral     COLONOSCOPY      CORONARY ARTERY BYPASS GRAFT WITH AORTIC VALVE REPAIR/REPLACEMENT N/A 2018    Procedure: INTRAOPERATIVE ALEX, MIDLINE STERNOTOMY, CORONARY ARTERY BYPASS GRAFTING X 3 USING ENDOSCOPICALLY HARVESTED LEFT GREATER SAPHENOUS VEIN,  AORTIC VALVE REPLACEMENT USING 25MM LOPEZ II ULTRA PORCINE VALVE, PRP;  Surgeon: Phong Posey MD;  Location: [facility] OR;  Service: Cardiothoracic    FOOT  SURGERY Right 2010    5th digit removal    FOOT SURGERY Left 2011    1 digit removed    INCISION AND DRAINAGE LEG Right 3/28/2020    Procedure: DEBRIDMENT OF RIGHT CALF;  Surgeon: Ross Pineda MD;  Location: [facility] MAIN OR;  Service: Vascular;  Laterality: Right;    INCISION AND DRAINAGE LEG Right 3/10/2025    Procedure: INCISION AND DRAINAGE LOWER EXTREMITY;  Surgeon: Jimenez Mchugh DPM;  Location: [facility] MAIN OR;  Service: Podiatry;  Laterality: Right;    INGUINAL HERNIA REPAIR Left 11/29/2024    Procedure: INGUINAL HERNIA REPAIR LAPAROSCOPIC WITH DAVINCI ROBOT;  Surgeon: Phong Lazo MD;  Location: [facility] OR;  Service: Robotics - DaVinci;  Laterality: Left;    INSERTION HEMODIALYSIS CATHETER N/A 3/11/2025    Procedure: HEMODIALYSIS CATHETER INSERTION;  Surgeon: Jeaneth Bonds MD;  Location: [facility] OR;  Service: Vascular;  Laterality: N/A;    QUADRICEPS TENDON REPAIR Left 5/14/2019    Procedure: Left QUADRICEPS TENDON REPAIR;  Surgeon: Camilo Hunter MD;  Location: [facility] OR;  Service: Orthopedics    THORACENTESIS Right 11/21/2016    THORACOSCOPY Right 5/8/2017    Procedure: BRONCHOSCOPY, RIGHT VAT,  TOTAL DECORTICATION RIGHT LUNG, PLEURAL BX, PLACEMENT SUBPLEURAL PAIN CAATHETERS X2;  Surgeon: Donald Orlando III, MD;  Location: [facility] OR;  Service:     TONSILECTOMY, ADENOIDECTOMY, BILATERAL MYRINGOTOMY AND TUBES      WOUND DEBRIDEMENT Right 3/13/2025    Procedure: DEBRIDEMENT FOOT, RIGHT;  Surgeon: Jimenez Mchugh DPM;  Location: [facility] OR;  Service: Podiatry;  Laterality: Right;        Social History:   Social History     Tobacco Use    Smoking status: Never     Passive exposure: Never    Smokeless tobacco: Never   Substance Use Topics    Alcohol use: Yes     Alcohol/week: 7.0 standard drinks of alcohol     Types: 7 Drinks containing 0.5 oz of alcohol per week     Comment: either one glass wine or one glass liquor per  day      Family History:  Family  History   Problem Relation Age of Onset    Hypertension Father     Malig Hyperthermia Neg Hx           Allergies:  Allergies   Allergen Reactions    Ceclor [Cefaclor] Rash     Rash in his 50s; he tolerated piperacillin-tazobactam in March 2020     Scheduled Meds:  amiodarone, 200 mg, Daily  ampicillin-sulbactam, 3 g, Q24H  apixaban, 2.5 mg, BID  vitamin C, 250 mg, Daily  atorvastatin, 20 mg, Nightly  bumetanide, 2 mg, TID  cholecalciferol, 1,000 Units, Daily  clopidogrel, 75 mg, Daily  Pharmacy to Dose daptomycin (CUBICIN), , Daily  epoetin vince/vince-epbx, 10,000 Units, Once per day on Monday Wednesday Friday  hydrALAZINE, 10 mg, TID  insulin glargine, 5 Units, Nightly  insulin lispro, 2-9 Units, 4x Daily AC & at Bedtime  insulin lispro, 6 Units, TID With Meals  Lidocaine, 1 patch, Q24H  linagliptin, 5 mg, Daily  melatonin, 5 mg, Nightly  Menthol-Zinc Oxide, 1 Application, Q12H  metoprolol succinate XL, 25 mg, Daily  pantoprazole, 40 mg, Q AM  sevelamer, 800 mg, TID With Meals  sodium chloride, 3 mL, Q12H  tamsulosin, 0.4 mg, Daily  zolpidem, 2.5 mg, Nightly            INTAKE AND OUTPUT:    Intake/Output Summary (Last 24 hours) at 3/19/2025 1138  Last data filed at 3/18/2025 1710  Gross per 24 hour   Intake --   Output 3000 ml   Net -3000 ml       Review of Systems:   GI:  Cardiac: No chest pain  Pulmonary:    Constitutional:  Temp:  [97.5 °F (36.4 °C)-98 °F (36.7 °C)] 97.5 °F (36.4 °C)  Heart Rate:  [66-75] 72  Resp:  [16-18] 18  BP: (106-187)/(53-81) 149/53    Physical Exam:  General:  Appears in no acute distress  Eyes: PERTL,  HEENT:  No JVD. Thyroid not visibly enlarged. No mucosal pallor or cyanosis  Respiratory: Respirations regular and unlabored at rest. BBS with good air entry in all fields. No crackles, rubs or wheezes auscultated  Cardiovascular: S1S2 Regular rate and rhythm. No murmur, rub or gallop. No carotid bruits. DP/PT pulses     . No pretibial pitting edema  Gastrointestinal: Abdomen soft, flat,  "non tender. Bowel sounds present. No hepatosplenomegaly. No ascites  Musculoskeletal: MARSHALL x4. No abnormal movements  Extremities: No digital clubbing or cyanosis  Skin: Color pink. Skin warm and dry to touch. No rashes    Neuro: AAO x3 CN II-XII grossly intact  Psych: Mood and affect normal, pleasant and cooperative          Cardiographics  Telemetry:     ECG:     Echocardiogram:     Lab Review   Results from last 7 days   Lab Units 03/19/25  0527 03/18/25  0948 03/18/25  0531 03/16/25  0431   CK TOTAL U/L  --   --   --  23   HSTROP T ng/L 386* 314* 293*  --      Results from last 7 days   Lab Units 03/19/25  0527   MAGNESIUM mg/dL 2.1     Results from last 7 days   Lab Units 03/19/25  0527   SODIUM mmol/L 133*   POTASSIUM mmol/L 3.5   BUN mg/dL 22   CREATININE mg/dL 3.64*   CALCIUM mg/dL 8.4*     @[id number]@  Results from last 7 days   Lab Units 03/19/25  0527 03/18/25  0531 03/17/25  0552   WBC 10*3/mm3 9.78 10.37 12.90*   HEMOGLOBIN g/dL 9.0* 8.8* 8.9*   HEMATOCRIT % 29.0* 27.4* 27.8*   PLATELETS 10*3/mm3 320 319 326             Assessment:  Atypical chest pain  Renal failure  Elevated troponin      Plan:    Considering patient is not a great candidate for invasive workup as well as the patient has no chest pains today yesterday chest pains were atypical with no change in EKGs as well as troponins no further workup is needed we will follow-up as an outpatient patient can be discharged      )3/19/2025  Jo Toney MD      [facility] Dragon/Transcription:   \"Dictated utilizing Dragon dictation\".     "

## 2025-03-19 NOTE — PROGRESS NOTES
"      FOLLOW UP NOTE      Name: Rock Maciel Jr. ADMIT: 3/3/2025   : 1945  PCP: Denise Dickson APRN    MRN: [id number] LOS: 16 days   AGE/SEX: 79 y.o. male  ROOM: [facility]     Date of Service: 3/19/2025                           CHIEF COMPLIANTS / REASON FOR FOLLOW UP                Subjective:      Tolerated HD with 3 L UF yesterday.  On 2 L nasal cannula.  Feels much better today.  Breathing better.  Might be going to SNF soon.    Review of Systems:     Negative except as above       OBJECTIVE                                                                        Exam:  /53 (BP Location: Right arm, Patient Position: Lying)   Pulse 72   Temp 97.5 °F (36.4 °C) (Oral)   Resp 18   Ht 185.4 cm (73\")   Wt 119 kg (262 lb 5.6 oz)   SpO2 97%   BMI 34.61 kg/m²   Intake/Output last 3 shifts:  I/O last 3 completed shifts:  In: -   Out: 3120 [Urine:120]  Intake/Output this shift:  No intake/output data recorded.    General Appearance: Chronically ill, pale appearing, comfortable and conversing on 2 L nasal cannula.  Lungs:   Lungs diminished  Heart: RRR  Abdomen:  Soft, nontender  Extremities: Edema appears improved.  Neurologic:   Alert and oriented  [facility] TDC      Scheduled Meds:amiodarone, 200 mg, Oral, Daily  ampicillin-sulbactam, 3 g, Intravenous, Q24H  apixaban, 2.5 mg, Oral, BID  vitamin C, 250 mg, Oral, Daily  atorvastatin, 20 mg, Oral, Nightly  bumetanide, 2 mg, Oral, TID  cholecalciferol, 1,000 Units, Oral, Daily  clopidogrel, 75 mg, Oral, Daily  Pharmacy to Dose daptomycin (CUBICIN), , Not Applicable, Daily  epoetin vince/vince-epbx, 10,000 Units, Intravenous, Once per day on   hydrALAZINE, 10 mg, Oral, TID  insulin glargine, 5 Units, Subcutaneous, Nightly  insulin lispro, 2-9 Units, Subcutaneous, 4x Daily AC & at Bedtime  insulin lispro, 6 Units, Subcutaneous, TID With Meals  Lidocaine, 1 patch, Transdermal, Q24H  linagliptin, 5 mg, Oral, Daily  melatonin, 5 mg, Oral, " Nightly  Menthol-Zinc Oxide, 1 Application, Topical, Q12H  metoprolol succinate XL, 25 mg, Oral, Daily  pantoprazole, 40 mg, Oral, Q AM  sevelamer, 800 mg, Oral, TID With Meals  sodium chloride, 3 mL, Intravenous, Q12H  tamsulosin, 0.4 mg, Oral, Daily  zolpidem, 2.5 mg, Oral, Nightly      Continuous Infusions:Pharmacy Consult - Pharmacy to dose,         PRN Meds:  artificial tears    senna-docusate sodium **AND** polyethylene glycol **AND** bisacodyl **AND** bisacodyl    cadexomer iodine    dextrose    dextrose    famotidine    glucagon (human recombinant)    heparin (porcine)    HYDROcodone-acetaminophen    nitroglycerin    ondansetron ODT **OR** ondansetron    Pharmacy Consult - Pharmacy to dose    sodium chloride    sodium chloride         Data Review:                                                                           Labs reviewed        Imaging:                                                                           Radiology reviewed           ASSESSMENT:                                                                                Traumatic rhabdomyolysis    Arteriosclerosis of coronary artery    Essential hypertension    Type 2 diabetes mellitus with circulatory disorder, without long-term current use of insulin    Hyperlipidemia    Charcot-Davida-Tooth disease-like deformity of foot    Fall    S/P CABG x 2    KILLIAN (acute kidney injury)    Chronic diastolic (congestive) heart failure    Stage 3b chronic kidney disease    Chronic anticoagulation    Persistent atrial fibrillation    Closed displaced fracture of left clavicle    Pneumonia due to infectious organism    ATN (acute tubular necrosis)    Contusion of left knee    Acute osteomyelitis of right foot    Diabetic foot infection    MRSA (methicillin resistant Staphylococcus aureus) infection    Acute kidney injury         KILLIAN: likely ATN related to rhabdomyolysis, prerenal insult related to diuretics etc.  Needing RRT since 3/11/2025. Reason for  GFR decline likely multifactorial, some ATN, ongoing osteomyelitis etc.   RIJ TDC placed 3/11/2025.  First HD 3/12.    Fluid overload/severe hypotension with pulmonary edema  Lower extremity osteomyelitis/cellulitis  CKD stage III-IV: Baseline creatinine  1.8-2.6 mg/dL with baseline GFR around 25 mL/min  Acute urinary retention requiring Johnson catheter placement.  Clavicle fracture  A-fib with controlled rates..  Severe anemia in CKD: Stable.          PLAN:                                                                            HD completed 3/18 with 3 L UF.  Plan for HD again today per [facility] schedule.  Volume/hypertension better with UF.  Podiatry and ID managing foot osteomyelitis: Antibiotics dosed for CrCl <5 mL/min.  Continue EPO with HD and transfuse as necessary.  Hemoglobin improving.  Decrease dose of Renvela.  Monitor phosphorus levels.  Going to rehab soon.    Tiesha Mccrary MD  [facility]  3/19/2025  10:38 EDT

## 2025-03-24 LAB
QT INTERVAL: 493 MS
QTC INTERVAL: 517 MS

## 2025-03-26 NOTE — PROGRESS NOTES
Enter Query Response Below      Query Response: Aspiration pneumonia              If applicable, please update the problem list.

## 2025-03-28 NOTE — PROGRESS NOTES
Enter Query Response Below      Query Response: Unable to determine             If applicable, please update the problem list.

## 2025-03-28 NOTE — PROGRESS NOTES
Enter Query Response Below      Query Response: Post procedural right apical pneumothorax - clinically significant              If applicable, please update the problem list.

## 2025-05-16 ENCOUNTER — HOSPITAL ENCOUNTER (EMERGENCY)
Facility: HOSPITAL | Age: 80
Discharge: HOME | End: 2025-05-16
Attending: EMERGENCY MEDICINE
Payer: MEDICARE

## 2025-05-16 ENCOUNTER — APPOINTMENT (OUTPATIENT)
Dept: GENERAL RADIOLOGY | Facility: HOSPITAL | Age: 80
End: 2025-05-16
Payer: MEDICARE

## 2025-05-16 VITALS
TEMPERATURE: 98.3 F | HEART RATE: 76 BPM | OXYGEN SATURATION: 96 % | SYSTOLIC BLOOD PRESSURE: 134 MMHG | RESPIRATION RATE: 16 BRPM | DIASTOLIC BLOOD PRESSURE: 82 MMHG

## 2025-05-16 DIAGNOSIS — M25.512 ACUTE PAIN OF LEFT SHOULDER: Primary | ICD-10-CM

## 2025-05-16 PROCEDURE — 73030 X-RAY EXAM OF SHOULDER: CPT

## 2025-05-16 PROCEDURE — 99283 EMERGENCY DEPT VISIT LOW MDM: CPT

## 2025-05-16 NOTE — ED PROVIDER NOTES
EMERGENCY DEPARTMENT ENCOUNTER    Room Number:  02/02  Date of encounter:  5/16/2025  PCP: Denise Dickson APRN  Historian: EMS, patient  Chronic or social conditions impacting care (social determinants of health): Full code from nursing facility    HPI:  Chief Complaint: Left shoulder pain  A complete HPI/ROS/PMH/PSH/SH/FH are unobtainable due to: Nothing    Context: Rock Maciel Jr. is a 79 y.o. male with a history of diabetes, obesity, coronary artery disease, A-fib, osteomyelitis, who presents to the ED c/o acute left shoulder pain.  Patient reports that his shoulder has been painful for the past 2 months.  He was here approximately 2 months ago after a fall with multiple complications.  He had a distal clavicle fracture at that point.  He complains of limited range of motion.  Apparently he did have x-rays at the facility which showed some sort of abnormality.    Review of prior external notes (non-ED):   I reviewed admission from 3/3/2025 through 3/19/2025.  Patient admitted for rhabdomyolysis, osteomyelitis of the right foot with amputation.    Review of prior external test results outside of this encounter:  I reviewed a BMP from 5/5/2025.  Creatinine 4.22, potassium 4.4    PAST MEDICAL HISTORY  Active Ambulatory Problems     Diagnosis Date Noted    Abnormal ECG 05/17/2016    Arteriosclerosis of coronary artery 05/17/2016    Cardiac murmur 05/17/2016    Essential hypertension 05/17/2016    LAFB (left anterior fascicular block) 05/17/2016    Bundle branch block, right 05/17/2016    Neuropathic arthropathy 05/17/2016    Type 2 diabetes mellitus with circulatory disorder, without long-term current use of insulin 05/17/2016    SHASTA (obstructive sleep apnea) 05/17/2016    Gain of weight 05/17/2016    Gout 10/27/2012    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 10/27/2012    Chronic venous insufficiency 10/27/2012    Nonrheumatic aortic valve stenosis 01/30/2017    Carotid stenosis, asymptomatic  01/30/2017    Bradycardia 05/08/2018    Hyperlipidemia 05/08/2018    Bilateral carotid artery disease 05/08/2018    Abnormal cardiovascular stress test 06/26/2018    H/O aortic valve replacement with porcine valve 09/10/2018    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    Amputated toe of right foot 04/11/2019    Fall 05/06/2019    Non-traumatic rhabdomyolysis 05/06/2019    S/P CABG x 2 05/06/2019    CKD (chronic kidney disease) stage 3, GFR 30-59 ml/min 05/06/2019    Rupture of left quadriceps tendon 05/07/2019    Constipation 05/11/2019    KILLIAN (acute kidney injury) 03/26/2020    Wound of right leg 03/26/2020    Anemia 03/26/2020    Chronic diastolic (congestive) heart failure 03/30/2020    Amputated toe of left foot 02/12/2021    Gas gangrene 10/25/2021    Cellulitis of left lower limb 02/19/2022    Acquired absence of left foot 02/19/2022    Bilateral lower extremity edema 06/04/2023    Stage 3b chronic kidney disease 09/22/2023    History of pneumonia 03/10/2024    Atelectasis of both lungs 05/19/2024    Abnormal pleural fluid 05/19/2024    Pleural thickening 05/19/2024    Atrial fibrillation, persistent 10/18/2024    Chronic anticoagulation 10/18/2024    Persistent atrial fibrillation 11/07/2024    Bladder wall thickening 12/01/2024    Hematuria 12/01/2024    CKD (chronic kidney disease) stage 4, GFR 15-29 ml/min 12/01/2024    Hypoxemia 12/01/2024    Nocturnal hypoxemia 12/01/2024    Status post left inguinal hernia repair, follow-up exam 01/05/2025    Weakness of both lower extremities 01/05/2025    Unsteady gait when walking 01/05/2025    Wound, open, arm, forearm, right, subsequent encounter 01/06/2025    Traumatic rhabdomyolysis 03/03/2025    Closed displaced fracture of left clavicle 03/03/2025    Pneumonia due to infectious organism 03/03/2025    ATN (acute tubular necrosis) 03/05/2025    Contusion of left knee 03/07/2025    Acute osteomyelitis of right foot 03/07/2025    Diabetic foot  infection 03/07/2025    MRSA (methicillin resistant Staphylococcus aureus) infection 03/08/2025    Acute kidney injury 03/03/2025    Chronic heart failure with preserved ejection fraction (HFpEF) 03/19/2025     Resolved Ambulatory Problems     Diagnosis Date Noted    Aortic heart valve narrowing 05/17/2016    Accumulation of fluid in tissues 05/17/2016    Alimentary obesity 05/17/2016    Pleural effusion, right 10/05/2016    Decubitus ulcer of ankle 11/15/2012    Class 1 obesity due to excess calories without serious comorbidity with body mass index (BMI) of 30.0 to 30.9 in adult 10/27/2012    Ulcer of ankle 11/15/2012    Pleural effusion 05/08/2017    Constipation 03/30/2020    Body mass index (BMI)40.0-44.9, adult 10/20/2020    Incarcerated inguinal hernia 11/26/2024     Past Medical History:   Diagnosis Date    Allergic rhinitis     Bronchitis     Coronary artery disease     Diabetes mellitus 2001    DM (diabetes mellitus)     Encounter for annual health examination 05/06/2014    Hiatal hernia     History of MRSA infection     Hypertension     Murmur     Pneumothorax on right     Sleep apnea     Wellness examination 06/24/2015         PAST SURGICAL HISTORY  Past Surgical History:   Procedure Laterality Date    ARTERY SURGERY Bilateral     carotid    BONE EXCISION LEG Right 3/10/2025    Procedure: BONE EXCISION LOWER EXTERMITY, RIGHT;  Surgeon: Jimenez Mchugh DPM;  Location: [facility] OR;  Service: Podiatry;  Laterality: Right;    CARDIAC CATHETERIZATION N/A 7/20/2018    Procedure: Left Heart Cath;  Surgeon: Jo Toney MD;  Location:  [facility] CATH INVASIVE LOCATION;  Service: Cardiovascular    CARDIAC CATHETERIZATION N/A 7/20/2018    Procedure: Coronary angiography;  Surgeon: Jo Toney MD;  Location: [facility] CATH INVASIVE LOCATION;  Service: Cardiovascular    CAROTID ENDARTERECTOMY Bilateral     COLONOSCOPY  2008    CORONARY ARTERY BYPASS GRAFT WITH AORTIC VALVE REPAIR/REPLACEMENT N/A  7/23/2018    Procedure: INTRAOPERATIVE ALEX, MIDLINE STERNOTOMY, CORONARY ARTERY BYPASS GRAFTING X 3 USING ENDOSCOPICALLY HARVESTED LEFT GREATER SAPHENOUS VEIN,  AORTIC VALVE REPLACEMENT USING 25MM LOPEZ II ULTRA PORCINE VALVE, PRP;  Surgeon: Phong Posey MD;  Location: [facility] OR;  Service: Cardiothoracic    FOOT SURGERY Right 2010    5th digit removal    FOOT SURGERY Left 2011    1 digit removed    INCISION AND DRAINAGE LEG Right 3/28/2020    Procedure: DEBRIDMENT OF RIGHT CALF;  Surgeon: Ross Pineda MD;  Location: [facility] MAIN OR;  Service: Vascular;  Laterality: Right;    INCISION AND DRAINAGE LEG Right 3/10/2025    Procedure: INCISION AND DRAINAGE LOWER EXTREMITY;  Surgeon: Jimenez Mchugh DPM;  Location: [facility] OR;  Service: Podiatry;  Laterality: Right;    INGUINAL HERNIA REPAIR Left 11/29/2024    Procedure: INGUINAL HERNIA REPAIR LAPAROSCOPIC WITH DAVINCI ROBOT;  Surgeon: Phong Lazo MD;  Location: [facility] OR;  Service: Robotics - DaVinci;  Laterality: Left;    INSERTION HEMODIALYSIS CATHETER N/A 3/11/2025    Procedure: HEMODIALYSIS CATHETER INSERTION;  Surgeon: Jeaneth Bonds MD;  Location: [facility] OR;  Service: Vascular;  Laterality: N/A;    QUADRICEPS TENDON REPAIR Left 5/14/2019    Procedure: Left QUADRICEPS TENDON REPAIR;  Surgeon: Camilo Hunter MD;  Location: [facility] OR;  Service: Orthopedics    THORACENTESIS Right 11/21/2016    THORACOSCOPY Right 5/8/2017    Procedure: BRONCHOSCOPY, RIGHT VAT,  TOTAL DECORTICATION RIGHT LUNG, PLEURAL BX, PLACEMENT SUBPLEURAL PAIN CAATHETERS X2;  Surgeon: Donald Orlando III, MD;  Location: [facility] OR;  Service:     TONSILECTOMY, ADENOIDECTOMY, BILATERAL MYRINGOTOMY AND TUBES      WOUND DEBRIDEMENT Right 3/13/2025    Procedure: DEBRIDEMENT FOOT, RIGHT;  Surgeon: Jimenez Mchugh DPM;  Location: [facility] OR;  Service: Podiatry;  Laterality: Right;         FAMILY HISTORY  Family History   Problem  Relation Age of Onset    Hypertension Father     Malig Hyperthermia Neg Hx          SOCIAL HISTORY  Social History     Socioeconomic History    Marital status:     Number of children: 1   Tobacco Use    Smoking status: Never     Passive exposure: Never    Smokeless tobacco: Never   Vaping Use    Vaping status: Never Used   Substance and Sexual Activity    Alcohol use: Yes     Alcohol/week: 7.0 standard drinks of alcohol     Types: 7 Drinks containing 0.5 oz of alcohol per week     Comment: either one glass wine or one glass liquor per  day    Drug use: Never    Sexual activity: Defer         ALLERGIES  Ceclor [cefaclor]        REVIEW OF SYSTEMS  All systems reviewed and negative except for those discussed in HPI.       PHYSICAL EXAM    I have reviewed the triage vital signs and nursing notes.    ED Triage Vitals [05/16/25 0112]   Temp Heart Rate Resp BP SpO2   98.1 °F (36.7 °C) 77 18 -- 98 %      Temp src Heart Rate Source Patient Position BP Location FiO2 (%)   Oral -- -- -- --       Physical Exam  GENERAL: Alert, oriented, chronically ill-appearing, not distressed  HENT: head atraumatic, no nuchal rigidity  EYES: no scleral icterus, EOMI  CV: regular rhythm, regular rate, 3/6 systolic murmur  RESPIRATORY: normal effort, CTA  ABDOMEN: soft, nontender  MUSCULOSKELETAL: Mild tenderness and swelling at the left distal clavicle.  Mildly reduced range of motion of the left shoulder.  No breakdown of the skin.  No erythema.  Prior potation of the left lower extremity.  Drain in the right foot with moderate swelling.  No erythema.  NEURO: alert, moves all extremities, follows commands  SKIN: warm, dry    RADIOLOGY  XR Shoulder 2+ View Left  Result Date: 5/16/2025  2 VIEWS LEFT SHOULDER  HISTORY: Pain  COMPARISON: March 3, 2025  FINDINGS: Exam is extremely limited, due to poor patient positioning. I do not see an obvious fracture of the proximal left humerus. The patient has a chronic fracture of the distal left  clavicle.      No new fractures are identified. Technically limited exam.  This report was finalized on 5/16/2025 1:53 AM by Dr. Inocencia Cruz M.D on Workstation: [id number]        I ordered the above noted radiological studies. Reviewed by me and discussed with radiologist.  See dictation for official radiology interpretation.      MEDICATIONS GIVEN IN ER    Medications - No data to display      ADDITIONAL ORDERS CONSIDERED BUT NOT ORDERED:  Admission was considered but after careful review of the patient's presentation, physical examination, diagnostic results, and response to treatment the patient may be safely discharged with outpatient follow-up.       PROGRESS, DATA ANALYSIS, CONSULTS, AND MEDICAL DECISION MAKING    All labs have been independently interpreted by myself.  All radiology studies have been independently interpreted by myself and discussed with radiologist dictating the report.   EKGs independently interpreted by myself.  Discussion below represents my analysis of pertinent findings related to patient's condition, differential diagnosis, treatment plan and final disposition.    I have discussed case with Dr. Figueroa, emergency room physician.  He has performed his own bedside examination and agrees with treatment plan.    ED Course as of 05/16/25 0510   Fri May 16, 2025   0130 Patient is full code from nursing home who presents with left shoulder pain.  Patient had a clavicle fracture 2 months ago.  Plan for repeat x-rays.    He is chronically ill with multiple medical problems however he denies any focal complaints other than his shoulder pain today. [EE]   0155 XR Shoulder 2+ View Left  Per my independent interpretation of the left shoulder x-ray, healing distal left clavicle fracture noted, no overtly evident pneumothorax on visible lung fields [DC]   0310 Have attempted multiple times to get through to the nursing home without an answer.  The patient himself denies any other focal  complaints.  We will discharge. [EE]   0328 Nursing home finally returned phone call.  Patient was sent out only to evaluate left shoulder.  He does have an old clavicular fracture.  No acute fractures.  No treatment necessary. [EE]      ED Course User Index  [DC] Jose Figueroa MD  [EE] Aly Nova PA       AS OF 05:10 EDT VITALS:    BP - 126/51  HR - 72  TEMP - 98.1 °F (36.7 °C) (Oral)  O2 SATS - 100%        DIAGNOSIS  Final diagnoses:   Acute pain of left shoulder         DISPOSITION  Discharged    Admission was considered but after careful review of the patient's presentation, physical examination, diagnostic results, and response to treatment the patient may be safely discharged with outpatient follow-up.         Dictated utilizing Dragon dictation     Aly Nova PA  05/16/25 3601

## 2025-05-16 NOTE — ED PROVIDER NOTES
Pt presents to the ED c/o potential abnormality with his left shoulder.  Patient had recent known clavicle fracture.  Was sent for facility due to abnormality of the x-rays.  He is unsure why he is in the emergency department denies any new emergent complaint.     On exam,   General: No acute distress, nontoxic  HEENT: EOMI  Pulm: Symmetric chest rise, nonlabored breathing  CV: Regular rate and rhythm  GI: Nondistended  MSK: No deformity right foot wound VAC in place without right abnormality  Skin: Warm, dry  Neuro: Awake, alert, oriented x 4, moving all extremities, no focal deficits  Psych: Calm, cooperative    Vital signs and nursing notes reviewed.           Plan: Plan for left shoulder x-ray and reevaluate.  Patient denies considerable complaint at this time.    ED Course as of 05/16/25 0511   Fri May 16, 2025   0130 Patient is full code from nursing home who presents with left shoulder pain.  Patient had a clavicle fracture 2 months ago.  Plan for repeat x-rays.    He is chronically ill with multiple medical problems however he denies any focal complaints other than his shoulder pain today. [EE]   0155 XR Shoulder 2+ View Left  Per my independent interpretation of the left shoulder x-ray, healing distal left clavicle fracture noted, no overtly evident pneumothorax on visible lung fields [DC]   0310 Have attempted multiple times to get through to the nursing home without an answer.  The patient himself denies any other focal complaints.  We will discharge. [EE]   0328 Nursing home finally returned phone call.  Patient was sent out only to evaluate left shoulder.  He does have an old clavicular fracture.  No acute fractures.  No treatment necessary. [EE]      ED Course User Index  [DC] Jose Figueroa MD  [EE] Aly Nova, PA       No acute emergent findings identified, safe for discharge back to facility.     MD Attestation Note    SHARED VISIT: This visit was performed by BOTH a physician and an APC. The  substantive portion of the medical decision making was performed by this attesting physician who made or approved the management plan and takes responsibility for patient management. All studies in the APC note (if performed) were independently interpreted by me.                   Jose Figueroa MD  05/16/25 0512

## 2025-05-16 NOTE — ED NOTES
Attempt made to speak with pt's nurse at [facility], that nurse is currently on break but will call back when she returns.    [facility] [phone]

## 2025-05-21 ENCOUNTER — HOSPITAL ENCOUNTER (INPATIENT)
Facility: HOSPITAL | Age: 80
LOS: 13 days | Discharge: NURSING, REHAB OR LONG-TERM CARE | End: 2025-06-03
Attending: STUDENT IN AN ORGANIZED HEALTH CARE EDUCATION/TRAINING PROGRAM | Admitting: INTERNAL MEDICINE
Payer: MEDICARE

## 2025-05-21 DIAGNOSIS — Z99.2 ESRD ON HEMODIALYSIS: ICD-10-CM

## 2025-05-21 DIAGNOSIS — K92.2 GASTROINTESTINAL HEMORRHAGE, UNSPECIFIED GASTROINTESTINAL HEMORRHAGE TYPE: ICD-10-CM

## 2025-05-21 DIAGNOSIS — D62 ACUTE BLOOD LOSS ANEMIA: Primary | ICD-10-CM

## 2025-05-21 DIAGNOSIS — K62.5 RECTAL BLEEDING: ICD-10-CM

## 2025-05-21 DIAGNOSIS — N18.6 ESRD ON HEMODIALYSIS: ICD-10-CM

## 2025-05-21 PROBLEM — E88.09 HYPOALBUMINEMIA: Status: ACTIVE | Noted: 2025-05-21

## 2025-05-21 LAB
ABO GROUP BLD: NORMAL
ALBUMIN SERPL-MCNC: 2.9 G/DL (ref 3.5–5.2)
ALBUMIN/GLOB SERPL: 0.7 G/DL
ALP SERPL-CCNC: 92 U/L (ref 39–117)
ALT SERPL W P-5'-P-CCNC: 9 U/L (ref 1–41)
ANION GAP SERPL CALCULATED.3IONS-SCNC: 8 MMOL/L (ref 5–15)
AST SERPL-CCNC: 12 U/L (ref 1–40)
BASOPHILS # BLD AUTO: 0.02 10*3/MM3 (ref 0–0.2)
BASOPHILS NFR BLD AUTO: 0.3 % (ref 0–1.5)
BILIRUB SERPL-MCNC: 0.3 MG/DL (ref 0–1.2)
BLD GP AB SCN SERPL QL: NEGATIVE
BUN SERPL-MCNC: 27 MG/DL (ref 8–23)
BUN/CREAT SERPL: 9.3 (ref 7–25)
CALCIUM SPEC-SCNC: 8.5 MG/DL (ref 8.6–10.5)
CHLORIDE SERPL-SCNC: 94 MMOL/L (ref 98–107)
CK SERPL-CCNC: 19 U/L (ref 20–200)
CO2 SERPL-SCNC: 32 MMOL/L (ref 22–29)
CREAT SERPL-MCNC: 2.89 MG/DL (ref 0.76–1.27)
CRP SERPL-MCNC: 7.7 MG/DL (ref 0–0.5)
DEPRECATED RDW RBC AUTO: 53.3 FL (ref 37–54)
EGFRCR SERPLBLD CKD-EPI 2021: 21.3 ML/MIN/1.73
EOSINOPHIL # BLD AUTO: 0.44 10*3/MM3 (ref 0–0.4)
EOSINOPHIL NFR BLD AUTO: 7.4 % (ref 0.3–6.2)
ERYTHROCYTE [DISTWIDTH] IN BLOOD BY AUTOMATED COUNT: 14.7 % (ref 12.3–15.4)
ERYTHROCYTE [SEDIMENTATION RATE] IN BLOOD: 24 MM/HR (ref 0–20)
FERRITIN SERPL-MCNC: 1417 NG/ML (ref 30–400)
GLOBULIN UR ELPH-MCNC: 4 GM/DL
GLUCOSE BLDC GLUCOMTR-MCNC: 103 MG/DL (ref 70–130)
GLUCOSE BLDC GLUCOMTR-MCNC: 98 MG/DL (ref 70–130)
GLUCOSE SERPL-MCNC: 110 MG/DL (ref 65–99)
HCT VFR BLD AUTO: 21.4 % (ref 37.5–51)
HCT VFR BLD AUTO: 22.5 % (ref 37.5–51)
HGB BLD-MCNC: 6.6 G/DL (ref 13–17.7)
HGB BLD-MCNC: 7.1 G/DL (ref 13–17.7)
HOLD SPECIMEN: NORMAL
HOLD SPECIMEN: NORMAL
IMM GRANULOCYTES # BLD AUTO: 0.02 10*3/MM3 (ref 0–0.05)
IMM GRANULOCYTES NFR BLD AUTO: 0.3 % (ref 0–0.5)
INR PPP: 1.46 (ref 0.9–1.1)
IRON 24H UR-MRATE: 49 MCG/DL (ref 59–158)
IRON 24H UR-MRATE: 49 MCG/DL (ref 59–158)
IRON SATN MFR SERPL: 27 % (ref 20–50)
LYMPHOCYTES # BLD AUTO: 0.59 10*3/MM3 (ref 0.7–3.1)
LYMPHOCYTES NFR BLD AUTO: 9.9 % (ref 19.6–45.3)
MCH RBC QN AUTO: 30.3 PG (ref 26.6–33)
MCHC RBC AUTO-ENTMCNC: 30.8 G/DL (ref 31.5–35.7)
MCV RBC AUTO: 98.2 FL (ref 79–97)
MONOCYTES # BLD AUTO: 0.46 10*3/MM3 (ref 0.1–0.9)
MONOCYTES NFR BLD AUTO: 7.7 % (ref 5–12)
NEUTROPHILS NFR BLD AUTO: 4.41 10*3/MM3 (ref 1.7–7)
NEUTROPHILS NFR BLD AUTO: 74.4 % (ref 42.7–76)
NRBC BLD AUTO-RTO: 0 /100 WBC (ref 0–0.2)
PLATELET # BLD AUTO: 294 10*3/MM3 (ref 140–450)
PMV BLD AUTO: 9 FL (ref 6–12)
POTASSIUM SERPL-SCNC: 3.9 MMOL/L (ref 3.5–5.2)
PROT SERPL-MCNC: 6.9 G/DL (ref 6–8.5)
PROTHROMBIN TIME: 17.7 SECONDS (ref 11.7–14.2)
RBC # BLD AUTO: 2.18 10*6/MM3 (ref 4.14–5.8)
RETICS # AUTO: 0.06 10*6/MM3 (ref 0.02–0.13)
RETICS/RBC NFR AUTO: 2.74 % (ref 0.7–1.9)
RH BLD: POSITIVE
SODIUM SERPL-SCNC: 134 MMOL/L (ref 136–145)
T&S EXPIRATION DATE: NORMAL
TIBC SERPL-MCNC: 182 MCG/DL (ref 298–536)
TRANSFERRIN SERPL-MCNC: 122 MG/DL (ref 200–360)
VIT B12 BLD-MCNC: 702 PG/ML (ref 211–946)
WBC NRBC COR # BLD AUTO: 5.94 10*3/MM3 (ref 3.4–10.8)
WHOLE BLOOD HOLD COAG: NORMAL
WHOLE BLOOD HOLD SPECIMEN: NORMAL

## 2025-05-21 PROCEDURE — P9016 RBC LEUKOCYTES REDUCED: HCPCS

## 2025-05-21 PROCEDURE — 36430 TRANSFUSION BLD/BLD COMPNT: CPT

## 2025-05-21 PROCEDURE — 85018 HEMOGLOBIN: CPT | Performed by: INTERNAL MEDICINE

## 2025-05-21 PROCEDURE — 86923 COMPATIBILITY TEST ELECTRIC: CPT

## 2025-05-21 PROCEDURE — 36415 COLL VENOUS BLD VENIPUNCTURE: CPT | Performed by: STUDENT IN AN ORGANIZED HEALTH CARE EDUCATION/TRAINING PROGRAM

## 2025-05-21 PROCEDURE — 86901 BLOOD TYPING SEROLOGIC RH(D): CPT | Performed by: STUDENT IN AN ORGANIZED HEALTH CARE EDUCATION/TRAINING PROGRAM

## 2025-05-21 PROCEDURE — 85652 RBC SED RATE AUTOMATED: CPT | Performed by: INTERNAL MEDICINE

## 2025-05-21 PROCEDURE — 82948 REAGENT STRIP/BLOOD GLUCOSE: CPT

## 2025-05-21 PROCEDURE — 85025 COMPLETE CBC W/AUTO DIFF WBC: CPT | Performed by: STUDENT IN AN ORGANIZED HEALTH CARE EDUCATION/TRAINING PROGRAM

## 2025-05-21 PROCEDURE — 85045 AUTOMATED RETICULOCYTE COUNT: CPT | Performed by: INTERNAL MEDICINE

## 2025-05-21 PROCEDURE — 85610 PROTHROMBIN TIME: CPT | Performed by: INTERNAL MEDICINE

## 2025-05-21 PROCEDURE — 99291 CRITICAL CARE FIRST HOUR: CPT

## 2025-05-21 PROCEDURE — 82607 VITAMIN B-12: CPT | Performed by: INTERNAL MEDICINE

## 2025-05-21 PROCEDURE — 83540 ASSAY OF IRON: CPT | Performed by: INTERNAL MEDICINE

## 2025-05-21 PROCEDURE — 80053 COMPREHEN METABOLIC PANEL: CPT | Performed by: STUDENT IN AN ORGANIZED HEALTH CARE EDUCATION/TRAINING PROGRAM

## 2025-05-21 PROCEDURE — 82728 ASSAY OF FERRITIN: CPT | Performed by: INTERNAL MEDICINE

## 2025-05-21 PROCEDURE — 84466 ASSAY OF TRANSFERRIN: CPT | Performed by: INTERNAL MEDICINE

## 2025-05-21 PROCEDURE — 85014 HEMATOCRIT: CPT | Performed by: INTERNAL MEDICINE

## 2025-05-21 PROCEDURE — 86850 RBC ANTIBODY SCREEN: CPT | Performed by: STUDENT IN AN ORGANIZED HEALTH CARE EDUCATION/TRAINING PROGRAM

## 2025-05-21 PROCEDURE — 86140 C-REACTIVE PROTEIN: CPT | Performed by: INTERNAL MEDICINE

## 2025-05-21 PROCEDURE — 86900 BLOOD TYPING SEROLOGIC ABO: CPT

## 2025-05-21 PROCEDURE — 87481 CANDIDA DNA AMP PROBE: CPT | Performed by: INTERNAL MEDICINE

## 2025-05-21 PROCEDURE — 86900 BLOOD TYPING SEROLOGIC ABO: CPT | Performed by: STUDENT IN AN ORGANIZED HEALTH CARE EDUCATION/TRAINING PROGRAM

## 2025-05-21 PROCEDURE — 82550 ASSAY OF CK (CPK): CPT | Performed by: INTERNAL MEDICINE

## 2025-05-21 RX ORDER — DEXTROSE MONOHYDRATE 25 G/50ML
25 INJECTION, SOLUTION INTRAVENOUS
Status: DISCONTINUED | OUTPATIENT
Start: 2025-05-21 | End: 2025-06-04 | Stop reason: HOSPADM

## 2025-05-21 RX ORDER — CALCIUM CARBONATE 500 MG/1
2 TABLET, CHEWABLE ORAL 2 TIMES DAILY PRN
Status: DISCONTINUED | OUTPATIENT
Start: 2025-05-21 | End: 2025-06-04 | Stop reason: HOSPADM

## 2025-05-21 RX ORDER — INSULIN LISPRO 100 [IU]/ML
2-7 INJECTION, SOLUTION INTRAVENOUS; SUBCUTANEOUS
Status: DISCONTINUED | OUTPATIENT
Start: 2025-05-21 | End: 2025-06-04 | Stop reason: HOSPADM

## 2025-05-21 RX ORDER — ONDANSETRON 2 MG/ML
4 INJECTION INTRAMUSCULAR; INTRAVENOUS EVERY 6 HOURS PRN
Status: DISCONTINUED | OUTPATIENT
Start: 2025-05-21 | End: 2025-06-04 | Stop reason: HOSPADM

## 2025-05-21 RX ORDER — AMOXICILLIN 250 MG
2 CAPSULE ORAL 2 TIMES DAILY PRN
Status: DISCONTINUED | OUTPATIENT
Start: 2025-05-21 | End: 2025-05-25

## 2025-05-21 RX ORDER — SODIUM CHLORIDE 0.9 % (FLUSH) 0.9 %
10 SYRINGE (ML) INJECTION EVERY 12 HOURS SCHEDULED
Status: DISCONTINUED | OUTPATIENT
Start: 2025-05-21 | End: 2025-06-04 | Stop reason: HOSPADM

## 2025-05-21 RX ORDER — NITROGLYCERIN 0.4 MG/1
0.4 TABLET SUBLINGUAL
Status: DISCONTINUED | OUTPATIENT
Start: 2025-05-21 | End: 2025-06-04 | Stop reason: HOSPADM

## 2025-05-21 RX ORDER — SODIUM CHLORIDE 9 MG/ML
40 INJECTION, SOLUTION INTRAVENOUS AS NEEDED
Status: DISCONTINUED | OUTPATIENT
Start: 2025-05-21 | End: 2025-06-04 | Stop reason: HOSPADM

## 2025-05-21 RX ORDER — ACETAMINOPHEN 325 MG/1
650 TABLET ORAL EVERY 4 HOURS PRN
Status: DISCONTINUED | OUTPATIENT
Start: 2025-05-21 | End: 2025-06-04 | Stop reason: HOSPADM

## 2025-05-21 RX ORDER — IBUPROFEN 600 MG/1
1 TABLET ORAL
Status: DISCONTINUED | OUTPATIENT
Start: 2025-05-21 | End: 2025-06-04 | Stop reason: HOSPADM

## 2025-05-21 RX ORDER — NICOTINE POLACRILEX 4 MG
15 LOZENGE BUCCAL
Status: DISCONTINUED | OUTPATIENT
Start: 2025-05-21 | End: 2025-06-04 | Stop reason: HOSPADM

## 2025-05-21 RX ORDER — ONDANSETRON 4 MG/1
4 TABLET, ORALLY DISINTEGRATING ORAL EVERY 6 HOURS PRN
Status: DISCONTINUED | OUTPATIENT
Start: 2025-05-21 | End: 2025-06-04 | Stop reason: HOSPADM

## 2025-05-21 RX ORDER — SODIUM CHLORIDE 0.9 % (FLUSH) 0.9 %
10 SYRINGE (ML) INJECTION AS NEEDED
Status: DISCONTINUED | OUTPATIENT
Start: 2025-05-21 | End: 2025-06-04 | Stop reason: HOSPADM

## 2025-05-21 RX ORDER — ACETAMINOPHEN 650 MG/1
650 SUPPOSITORY RECTAL EVERY 4 HOURS PRN
Status: DISCONTINUED | OUTPATIENT
Start: 2025-05-21 | End: 2025-06-04 | Stop reason: HOSPADM

## 2025-05-21 RX ORDER — BISACODYL 5 MG/1
5 TABLET, DELAYED RELEASE ORAL DAILY PRN
Status: DISCONTINUED | OUTPATIENT
Start: 2025-05-21 | End: 2025-05-25

## 2025-05-21 RX ORDER — BISACODYL 10 MG
10 SUPPOSITORY, RECTAL RECTAL DAILY PRN
Status: DISCONTINUED | OUTPATIENT
Start: 2025-05-21 | End: 2025-05-25

## 2025-05-21 RX ORDER — ACETAMINOPHEN 160 MG/5ML
650 SOLUTION ORAL EVERY 4 HOURS PRN
Status: DISCONTINUED | OUTPATIENT
Start: 2025-05-21 | End: 2025-06-04 | Stop reason: HOSPADM

## 2025-05-21 RX ORDER — PANTOPRAZOLE SODIUM 40 MG/10ML
80 INJECTION, POWDER, LYOPHILIZED, FOR SOLUTION INTRAVENOUS ONCE
Status: COMPLETED | OUTPATIENT
Start: 2025-05-21 | End: 2025-05-21

## 2025-05-21 RX ORDER — POLYETHYLENE GLYCOL 3350 17 G/17G
17 POWDER, FOR SOLUTION ORAL DAILY PRN
Status: DISCONTINUED | OUTPATIENT
Start: 2025-05-21 | End: 2025-05-25

## 2025-05-21 RX ADMIN — Medication 10 ML: at 22:55

## 2025-05-21 RX ADMIN — PANTOPRAZOLE SODIUM 80 MG: 40 INJECTION, POWDER, FOR SOLUTION INTRAVENOUS at 14:54

## 2025-05-21 NOTE — ED NOTES
Patient to ED via EMS from [facility]. Patient was told his hemoglobin was low, he believes the labs were drawn a few days ago. Patient reports hx blood transfusion.

## 2025-05-21 NOTE — Clinical Note
Level of Care: Telemetry [5]   Diagnosis: Anemia [[id number]]   Admitting Physician: YANET LEYVA [[id number]]   Attending Physician: YANET LEYVA [[id number]]   Certification: I Certify That Inpatient Hospital Services Are Medically Necessary For Greater Than 2 Midnights

## 2025-05-21 NOTE — ED PROVIDER NOTES
EMERGENCY DEPARTMENT ENCOUNTER    Room Number:  03/03  Date of encounter:  5/21/2025  PCP: Denise Dickson APRN  Historian: Patient  Full history not obtainable due to: None    HPI:  Chief Complaint: Abnormal lab    Context: Rock Maciel Jr. is a 79 y.o. male with a PMH significant for traumatic rhabdomyolysis, end-stage kidney disease on dialysis, hypertension, SHASTA, gout, hyperlipidemia, HFpEF who presents to the ED c/o abnormal lab.  Patient received dialysis 5 days weekly, found to have an abnormally low hemoglobin.  Patient reports that he has had some blood in his urine the past few days, denies any other rectal bleeding or hematemesis.  Patient says that he has been in rehab ever since falling in early March, this caused a traumatic rhabdomyolysis which led to acute kidney failure.  Since then, patient reports that he has been on daily peritoneal dialysis. patient had labs drawn yesterday, was noted to have anemia, was sent to the emergency department today.  Patient did have his normal dialysis today.  Patient denies any chest pain, shortness of breath, abdominal pain.  Patient denies any dark or bloody stools.          MEDICAL RECORD REVIEW:    Upon review of the medical record it appears the patient was evaluated 3/3/2025.  The patient had a normal bilateral renal ultrasound and a chest x-ray that demonstrated mild cardiomegaly and mild patchy atelectasis.    PAST MEDICAL HISTORY    Active Ambulatory Problems     Diagnosis Date Noted    Abnormal ECG 05/17/2016    Arteriosclerosis of coronary artery 05/17/2016    Cardiac murmur 05/17/2016    Essential hypertension 05/17/2016    LAFB (left anterior fascicular block) 05/17/2016    Bundle branch block, right 05/17/2016    Neuropathic arthropathy 05/17/2016    Type 2 diabetes mellitus with circulatory disorder, without long-term current use of insulin 05/17/2016    SHASTA (obstructive sleep apnea) 05/17/2016    Gain of weight 05/17/2016    Gout 10/27/2012     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 10/27/2012    Chronic venous insufficiency 10/27/2012    Nonrheumatic aortic valve stenosis 01/30/2017    Carotid stenosis, asymptomatic 01/30/2017    Bradycardia 05/08/2018    Hyperlipidemia 05/08/2018    Bilateral carotid artery disease 05/08/2018    Abnormal cardiovascular stress test 06/26/2018    H/O aortic valve replacement with porcine valve 09/10/2018    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    Amputated toe of right foot 04/11/2019    Fall 05/06/2019    Non-traumatic rhabdomyolysis 05/06/2019    S/P CABG x 2 05/06/2019    CKD (chronic kidney disease) stage 3, GFR 30-59 ml/min 05/06/2019    Rupture of left quadriceps tendon 05/07/2019    Constipation 05/11/2019    KILLIAN (acute kidney injury) 03/26/2020    Wound of right leg 03/26/2020    Anemia 03/26/2020    Chronic diastolic (congestive) heart failure 03/30/2020    Amputated toe of left foot 02/12/2021    Gas gangrene 10/25/2021    Cellulitis of left lower limb 02/19/2022    Acquired absence of left foot 02/19/2022    Bilateral lower extremity edema 06/04/2023    Stage 3b chronic kidney disease 09/22/2023    History of pneumonia 03/10/2024    Atelectasis of both lungs 05/19/2024    Abnormal pleural fluid 05/19/2024    Pleural thickening 05/19/2024    Atrial fibrillation, persistent 10/18/2024    Chronic anticoagulation 10/18/2024    Persistent atrial fibrillation 11/07/2024    Bladder wall thickening 12/01/2024    Hematuria 12/01/2024    CKD (chronic kidney disease) stage 4, GFR 15-29 ml/min 12/01/2024    Hypoxemia 12/01/2024    Nocturnal hypoxemia 12/01/2024    Status post left inguinal hernia repair, follow-up exam 01/05/2025    Weakness of both lower extremities 01/05/2025    Unsteady gait when walking 01/05/2025    Wound, open, arm, forearm, right, subsequent encounter 01/06/2025    Traumatic rhabdomyolysis 03/03/2025    Closed displaced fracture of left clavicle 03/03/2025     Pneumonia due to infectious organism 03/03/2025    ATN (acute tubular necrosis) 03/05/2025    Contusion of left knee 03/07/2025    Acute osteomyelitis of right foot 03/07/2025    Diabetic foot infection 03/07/2025    MRSA (methicillin resistant Staphylococcus aureus) infection 03/08/2025    Acute kidney injury 03/03/2025    Chronic heart failure with preserved ejection fraction (HFpEF) 03/19/2025     Resolved Ambulatory Problems     Diagnosis Date Noted    Aortic heart valve narrowing 05/17/2016    Accumulation of fluid in tissues 05/17/2016    Alimentary obesity 05/17/2016    Pleural effusion, right 10/05/2016    Decubitus ulcer of ankle 11/15/2012    Class 1 obesity due to excess calories without serious comorbidity with body mass index (BMI) of 30.0 to 30.9 in adult 10/27/2012    Ulcer of ankle 11/15/2012    Pleural effusion 05/08/2017    Constipation 03/30/2020    Body mass index (BMI)40.0-44.9, adult 10/20/2020    Incarcerated inguinal hernia 11/26/2024     Past Medical History:   Diagnosis Date    Allergic rhinitis     Bronchitis     Coronary artery disease     Diabetes mellitus 2001    DM (diabetes mellitus)     Encounter for annual health examination 05/06/2014    Hiatal hernia     History of MRSA infection     Hypertension     Murmur     Pneumothorax on right     Sleep apnea     Wellness examination 06/24/2015         PAST SURGICAL HISTORY  Past Surgical History:   Procedure Laterality Date    ARTERY SURGERY Bilateral     carotid    BONE EXCISION LEG Right 3/10/2025    Procedure: BONE EXCISION LOWER EXTERMITY, RIGHT;  Surgeon: Jimenez Mchugh DPM;  Location: [facility] OR;  Service: Podiatry;  Laterality: Right;    CARDIAC CATHETERIZATION N/A 7/20/2018    Procedure: Left Heart Cath;  Surgeon: Jo Toney MD;  Location: [facility] INVASIVE LOCATION;  Service: Cardiovascular    CARDIAC CATHETERIZATION N/A 7/20/2018    Procedure: Coronary angiography;  Surgeon: Jo Toney MD;   Location: [facility] INVASIVE LOCATION;  Service: Cardiovascular    CAROTID ENDARTERECTOMY Bilateral     COLONOSCOPY  2008    CORONARY ARTERY BYPASS GRAFT WITH AORTIC VALVE REPAIR/REPLACEMENT N/A 7/23/2018    Procedure: INTRAOPERATIVE ALEX, MIDLINE STERNOTOMY, CORONARY ARTERY BYPASS GRAFTING X 3 USING ENDOSCOPICALLY HARVESTED LEFT GREATER SAPHENOUS VEIN,  AORTIC VALVE REPLACEMENT USING 25MM LOPEZ II ULTRA PORCINE VALVE, PRP;  Surgeon: Phong Posey MD;  Location: [facility] OR;  Service: Cardiothoracic    FOOT SURGERY Right 2010    5th digit removal    FOOT SURGERY Left 2011    1 digit removed    INCISION AND DRAINAGE LEG Right 3/28/2020    Procedure: DEBRIDMENT OF RIGHT CALF;  Surgeon: Ross Pineda MD;  Location: [facility] OR;  Service: Vascular;  Laterality: Right;    INCISION AND DRAINAGE LEG Right 3/10/2025    Procedure: INCISION AND DRAINAGE LOWER EXTREMITY;  Surgeon: Jimenez Mchugh DPM;  Location: [facility] OR;  Service: Podiatry;  Laterality: Right;    INGUINAL HERNIA REPAIR Left 11/29/2024    Procedure: INGUINAL HERNIA REPAIR LAPAROSCOPIC WITH DAVINCI ROBOT;  Surgeon: Phong Lazo MD;  Location: [facility] OR;  Service: Robotics - DaVinci;  Laterality: Left;    INSERTION HEMODIALYSIS CATHETER N/A 3/11/2025    Procedure: HEMODIALYSIS CATHETER INSERTION;  Surgeon: Jeaneth Bonds MD;  Location: [facility] OR;  Service: Vascular;  Laterality: N/A;    QUADRICEPS TENDON REPAIR Left 5/14/2019    Procedure: Left QUADRICEPS TENDON REPAIR;  Surgeon: Camilo Hunter MD;  Location: [facility] OR;  Service: Orthopedics    THORACENTESIS Right 11/21/2016    THORACOSCOPY Right 5/8/2017    Procedure: BRONCHOSCOPY, RIGHT VAT,  TOTAL DECORTICATION RIGHT LUNG, PLEURAL BX, PLACEMENT SUBPLEURAL PAIN CAATHETERS X2;  Surgeon: Donald Orlando III, MD;  Location: [facility] OR;  Service:     TONSILECTOMY, ADENOIDECTOMY, BILATERAL MYRINGOTOMY AND TUBES      WOUND DEBRIDEMENT Right  3/13/2025    Procedure: DEBRIDEMENT FOOT, RIGHT;  Surgeon: Jimenez Mchugh DPM;  Location: [facility] OR;  Service: Podiatry;  Laterality: Right;         FAMILY HISTORY  Family History   Problem Relation Age of Onset    Hypertension Father     Malig Hyperthermia Neg Hx          SOCIAL HISTORY  Social History     Socioeconomic History    Marital status:     Number of children: 1   Tobacco Use    Smoking status: Never     Passive exposure: Never    Smokeless tobacco: Never   Vaping Use    Vaping status: Never Used   Substance and Sexual Activity    Alcohol use: Yes     Alcohol/week: 7.0 standard drinks of alcohol     Types: 7 Drinks containing 0.5 oz of alcohol per week     Comment: either one glass wine or one glass liquor per  day    Drug use: Never    Sexual activity: Defer         ALLERGIES  Ceclor [cefaclor]        REVIEW OF SYSTEMS    All systems reviewed and marked as negative except as listed in HPI     PHYSICAL EXAM    I have reviewed the triage vital signs and nursing notes.    ED Triage Vitals [05/21/25 1212]   Temp Heart Rate Resp BP SpO2   97 °F (36.1 °C) 70 18 133/45 99 %      Temp src Heart Rate Source Patient Position BP Location FiO2 (%)   Oral Monitor -- -- --       Physical Exam  Constitutional:       General: He is not in acute distress.     Appearance: Normal appearance. He is not ill-appearing.   HENT:      Head: Normocephalic and atraumatic.      Nose: Nose normal.   Eyes:      Extraocular Movements: Extraocular movements intact.      Pupils: Pupils are equal, round, and reactive to light.   Cardiovascular:      Rate and Rhythm: Normal rate and regular rhythm.      Pulses: Normal pulses.      Heart sounds: Normal heart sounds.   Pulmonary:      Effort: Pulmonary effort is normal. No respiratory distress.      Breath sounds: Normal breath sounds.   Abdominal:      General: Abdomen is flat.      Palpations: Abdomen is soft.      Tenderness: There is no abdominal tenderness.    Genitourinary:     Rectum: Guaiac result positive. No external hemorrhoid or internal hemorrhoid.   Skin:     General: Skin is warm and dry.      Coloration: Skin is not jaundiced.   Neurological:      Mental Status: He is alert and oriented to person, place, and time.   Psychiatric:         Mood and Affect: Mood normal.         Behavior: Behavior normal.         Thought Content: Thought content normal.         Vital signs and nursing notes reviewed.      LAB RESULTS  Recent Results (from the past 24 hours)   Comprehensive Metabolic Panel    Collection Time: 05/21/25  1:03 PM    Specimen: Arm, Right; Blood   Result Value Ref Range    Glucose 110 (H) 65 - 99 mg/dL    BUN 27 (H) 8 - 23 mg/dL    Creatinine 2.89 (H) 0.76 - 1.27 mg/dL    Sodium 134 (L) 136 - 145 mmol/L    Potassium 3.9 3.5 - 5.2 mmol/L    Chloride 94 (L) 98 - 107 mmol/L    CO2 32.0 (H) 22.0 - 29.0 mmol/L    Calcium 8.5 (L) 8.6 - 10.5 mg/dL    Total Protein 6.9 6.0 - 8.5 g/dL    Albumin 2.9 (L) 3.5 - 5.2 g/dL    ALT (SGPT) 9 1 - 41 U/L    AST (SGOT) 12 1 - 40 U/L    Alkaline Phosphatase 92 39 - 117 U/L    Total Bilirubin 0.3 0.0 - 1.2 mg/dL    Globulin 4.0 gm/dL    A/G Ratio 0.7 g/dL    BUN/Creatinine Ratio 9.3 7.0 - 25.0    Anion Gap 8.0 5.0 - 15.0 mmol/L    eGFR 21.3 (L) >60.0 mL/min/1.73   Type & Screen    Collection Time: 05/21/25  1:03 PM    Specimen: Arm, Right; Blood   Result Value Ref Range    ABO Type O     RH type Positive     Antibody Screen Negative     T&S Expiration Date 5/24/2025 11:59:59 PM    CBC Auto Differential    Collection Time: 05/21/25  1:03 PM    Specimen: Arm, Right; Blood   Result Value Ref Range    WBC 5.94 3.40 - 10.80 10*3/mm3    RBC 2.18 (L) 4.14 - 5.80 10*6/mm3    Hemoglobin 6.6 (C) 13.0 - 17.7 g/dL    Hematocrit 21.4 (L) 37.5 - 51.0 %    MCV 98.2 (H) 79.0 - 97.0 fL    MCH 30.3 26.6 - 33.0 pg    MCHC 30.8 (L) 31.5 - 35.7 g/dL    RDW 14.7 12.3 - 15.4 %    RDW-SD 53.3 37.0 - 54.0 fl    MPV 9.0 6.0 - 12.0 fL    Platelets 294  140 - 450 10*3/mm3    Neutrophil % 74.4 42.7 - 76.0 %    Lymphocyte % 9.9 (L) 19.6 - 45.3 %    Monocyte % 7.7 5.0 - 12.0 %    Eosinophil % 7.4 (H) 0.3 - 6.2 %    Basophil % 0.3 0.0 - 1.5 %    Immature Grans % 0.3 0.0 - 0.5 %    Neutrophils, Absolute 4.41 1.70 - 7.00 10*3/mm3    Lymphocytes, Absolute 0.59 (L) 0.70 - 3.10 10*3/mm3    Monocytes, Absolute 0.46 0.10 - 0.90 10*3/mm3    Eosinophils, Absolute 0.44 (H) 0.00 - 0.40 10*3/mm3    Basophils, Absolute 0.02 0.00 - 0.20 10*3/mm3    Immature Grans, Absolute 0.02 0.00 - 0.05 10*3/mm3    nRBC 0.0 0.0 - 0.2 /100 WBC   Green Top (Gel)    Collection Time: 05/21/25  1:03 PM   Result Value Ref Range    Extra Tube Hold for add-ons.    Lavender Top    Collection Time: 05/21/25  1:03 PM   Result Value Ref Range    Extra Tube hold for add-on    Gold Top - SST    Collection Time: 05/21/25  1:03 PM   Result Value Ref Range    Extra Tube Hold for add-ons.    Light Blue Top    Collection Time: 05/21/25  1:03 PM   Result Value Ref Range    Extra Tube Hold for add-ons.    Prepare RBC, 1 Units    Collection Time: 05/21/25  2:41 PM   Result Value Ref Range    Product Code S2035O81     Unit Number [id number]     UNIT  ABO O     UNIT  RH POS     Crossmatch Interpretation Compatible     Dispense Status IS     Blood Expiration Date [id number]     Blood Type Barcode 5100        Ordered the above labs and independently reviewed the results.        RADIOLOGY  No Radiology Exams Resulted Within Past 24 Hours    I ordered the above noted radiological studies. Independently reviewed by me and discussed with radiologist.  See dictation above for official radiology interpretation.        MEDICATIONS GIVEN IN ER    Medications   sodium chloride 0.9 % flush 10 mL (has no administration in time range)   pantoprazole (PROTONIX) injection 80 mg (80 mg Intravenous Given 5/21/25 1454)         PROGRESS, DATA ANALYSIS, CONSULTS, AND MEDICAL DECISION MAKING    All labs have been independently  interpreted by me.  All radiology studies have been interpreted by me.  Discussion below represents my analysis of pertinent findings related to patient's condition, differential diagnosis, treatment plan and final disposition.    Patient to be admitted due to GI bleed causing anemia.  Patient has had his normal peritoneal dialysis.  Patient is in no acute distress, is slightly more fatigued than usual which could be due to his anemia.  Patient given 2 units of blood in the emergency department, started on pantoprazole    - Chronic or social conditions impacting care: Peritoneal dialysis      DIFFERENTIAL DIAGNOSIS INCLUDE BUT NOT LIMITED TO: Differential diagnosis includes but is not limited to:  - Hepatobiliary pathology such as cholecystitis, cholangitis, and symptomatic cholelithiasis  - Pancreatitis  - Dyspepsia  - Small bowel obstruction  - Appendicitis  - Diverticulitis  - UTI including pyelonephritis  - Ureteral stone  - Zoster  - Colitis, including infectious and ischemic  - Atypical ACS  - GI bleed      Orders placed during this visit:  Orders Placed This Encounter   Procedures    Comprehensive Metabolic Panel    [place] Draw    CBC Auto Differential    Verify Informed Consent    [facility] (on-call MD unless specified) Details    Type & Screen    Prepare RBC, 1 Units    Insert peripheral IV    CBC & Differential    Green Top (Gel)    Lavender Top    Gold Top - SST    Light Blue Top         ED Course as of 05/21/25 1629   Wed May 21, 2025   1348 Hemoglobin(!!): 6.6 [CV]   1454 Positive stool guaiac [CV]   1533 Patient started on pantoprazole and 2 units of blood. [CV]   1542 BUN(!): 27 [CV]   1542 Creatinine(!): 2.89  Improved from 2 weeks prior [CV]   1610 Consulted with Dr. Parisi,  with [facility]    We discussed the patient's history and physical exam, along with pertinent lab and imaging findings.  Patient to be admitted for further treatment and evaluation [CV]      ED Course User Index  [CV] sarah Clark  Prosper JANSEN PA-C       AS OF 15:38 EDT VITALS:    BP - 112/66  HR - 65  TEMP - 98.6 °F (37 °C)  02 SATS - 100%      No follow-up provider specified.       Medication List      No changes were made to your prescriptions during this visit.         I rechecked the patient.  I discussed the patient's labs, radiology findings (including all incidental findings), diagnosis, and plan for admission. The patient understands and agrees with the plan.    Critical care:  Total critical care time of 41 minutes is exclusive of any other billable procedures and includes time spent with direct patient care and observation, retrospective chart review, management of acute condition, and consultation with other medical providers.  Patient was symptomatically anemic, required 2 units of blood transfused emergently    DIAGNOSIS  Final diagnoses:   Rectal bleeding   Anemia, unspecified type   Peritoneal dialysis status         DISPOSITION  Admit    Pt masked in first look. I wore a surgical mask throughout my encounters with the pt. I performed hand hygiene on entry into the pt room and upon exit.     Dictated utilizing Dragon dictation     Note Disclaimer: At [facility], we believe that sharing information builds trust and better relationships. You are receiving this note because you recently visited [facility]. It is possible you will see health information before a provider has talked with you about it. This kind of information can be easy to misunderstand. To help you fully understand what it means for your health, we urge you to discuss this note with your provider.      Prosper Koo PA-C  05/21/25 3574

## 2025-05-21 NOTE — H&P
PCP: Denise Dickson APRN    Chief complaint   Chief Complaint   Patient presents with    Abnormal Lab       HPI  Patient is a 79 y.o. male presents with history of peritoneal dialysis, recent osteomyelitis, who presents to the ER secondary he was told that his hemoglobin was low and it was 6.6.  Patient had an extensive hospitalization in March a couple months ago due to a mechanical fall with traumatic rhabdo leading to KILLIAN on CKD 4 resulting in dialysis and also having osteomyelitis and abscess of the right foot.  He had been at rehab and simply a would vac however the nurse found it to be dry, pt with poor dressing over tunnel cath.     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  Added automatically from request for surgery [id number]      Gout     H/O aortic valve replacement with porcine valve 09/10/2018    Hiatal hernia     History of MRSA infection     RIGHT FOOT 2010    Hyperlipidemia     Hypertension     LAFB (left anterior fascicular block) 05/17/2016    MRSA (methicillin resistant  Staphylococcus aureus) infection 03/08/2025    Murmur     Neuropathic arthropathy 05/17/2016    Overview:   2015 IMO UPDATE  Overview:   2015 IMO UPDATE      Nocturnal hypoxemia 12/01/2024    Nonrheumatic aortic valve stenosis 01/30/2017    Persistent atrial fibrillation 11/07/2024    Pneumothorax on right     S/P CABG x 2 05/06/2019 July 2018      Sleep apnea     pt wears CPAP at night    Status post left inguinal hernia repair, follow-up exam 01/05/2025    Traumatic rhabdomyolysis 03/03/2025    Type 2 diabetes mellitus with circulatory disorder, without long-term current use of insulin 05/17/2016    Unsteady gait when walking 01/05/2025    Wellness examination 06/24/2015    Annual Wellness Visit       PAST SURGICAL HISTORY  Past Surgical History:   Procedure Laterality Date    ARTERY SURGERY Bilateral     carotid    BONE EXCISION LEG Right 3/10/2025    Procedure: BONE EXCISION LOWER EXTERMITY, RIGHT;  Surgeon: Jimenez Mchugh DPM;  Location: [facility] OR;  Service: Podiatry;  Laterality: Right;    CARDIAC CATHETERIZATION N/A 7/20/2018    Procedure: Left Heart Cath;  Surgeon: Jo Toney MD;  Location: [facility] CATH INVASIVE LOCATION;  Service: Cardiovascular    CARDIAC CATHETERIZATION N/A 7/20/2018    Procedure: Coronary angiography;  Surgeon: Jo Toney MD;  Location: [facility] CATH INVASIVE LOCATION;  Service: Cardiovascular    CAROTID ENDARTERECTOMY Bilateral     COLONOSCOPY  2008    CORONARY ARTERY BYPASS GRAFT WITH AORTIC VALVE REPAIR/REPLACEMENT N/A 7/23/2018    Procedure: INTRAOPERATIVE ALEX, MIDLINE STERNOTOMY, CORONARY ARTERY BYPASS GRAFTING X 3 USING ENDOSCOPICALLY HARVESTED LEFT GREATER SAPHENOUS VEIN,  AORTIC VALVE REPLACEMENT USING 25MM LOPEZ II ULTRA PORCINE VALVE, PRP;  Surgeon: Phong Posey MD;  Location: [facility] OR;  Service: Cardiothoracic    FOOT SURGERY Right 2010    5th digit removal    FOOT SURGERY Left 2011    1 digit removed    INCISION AND DRAINAGE LEG  Right 3/28/2020    Procedure: DEBRIDMENT OF RIGHT CALF;  Surgeon: Ross Pineda MD;  Location: [facility] MAIN OR;  Service: Vascular;  Laterality: Right;    INCISION AND DRAINAGE LEG Right 3/10/2025    Procedure: INCISION AND DRAINAGE LOWER EXTREMITY;  Surgeon: Jimenez Mchugh DPM;  Location: [facility] MAIN OR;  Service: Podiatry;  Laterality: Right;    INGUINAL HERNIA REPAIR Left 11/29/2024    Procedure: INGUINAL HERNIA REPAIR LAPAROSCOPIC WITH DAVINCI ROBOT;  Surgeon: Phong Lazo MD;  Location: [facility] MAIN OR;  Service: Robotics - DaVinci;  Laterality: Left;    INSERTION HEMODIALYSIS CATHETER N/A 3/11/2025    Procedure: HEMODIALYSIS CATHETER INSERTION;  Surgeon: Jeaneth Bonds MD;  Location: [facility] MAIN OR;  Service: Vascular;  Laterality: N/A;    QUADRICEPS TENDON REPAIR Left 5/14/2019    Procedure: Left QUADRICEPS TENDON REPAIR;  Surgeon: Camilo Hunter MD;  Location: [facility] MAIN OR;  Service: Orthopedics    THORACENTESIS Right 11/21/2016    THORACOSCOPY Right 5/8/2017    Procedure: BRONCHOSCOPY, RIGHT VAT,  TOTAL DECORTICATION RIGHT LUNG, PLEURAL BX, PLACEMENT SUBPLEURAL PAIN CAATHETERS X2;  Surgeon: Donald Orlando III, MD;  Location: [facility] OR;  Service:     TONSILECTOMY, ADENOIDECTOMY, BILATERAL MYRINGOTOMY AND TUBES      WOUND DEBRIDEMENT Right 3/13/2025    Procedure: DEBRIDEMENT FOOT, RIGHT;  Surgeon: Jimenez Mchugh DPM;  Location: [facility] OR;  Service: Podiatry;  Laterality: Right;       FAMILY HISTORY  Family History   Problem Relation Age of Onset    Hypertension Father     Malig Hyperthermia Neg Hx        SOCIAL HISTORY  Social History     Tobacco Use    Smoking status: Never     Passive exposure: Never    Smokeless tobacco: Never   Vaping Use    Vaping status: Never Used   Substance Use Topics    Alcohol use: Yes     Alcohol/week: 7.0 standard drinks of alcohol     Types: 7 Drinks containing 0.5 oz of alcohol per week     Comment: either one glass wine or one  glass liquor per  day    Drug use: Never       MEDICATIONS:  Medications Prior to Admission   Medication Sig Dispense Refill Last Dose/Taking    acetaminophen (TYLENOL) 325 MG tablet Take 2 tablets by mouth Every 6 (Six) Hours As Needed for Mild Pain.       amiodarone (PACERONE) 200 MG tablet Take 1 tablet by mouth Daily. 30 tablet 5     apixaban (ELIQUIS) 2.5 MG tablet tablet Take 1 tablet by mouth 2 (Two) Times a Day. Indications: Atrial Fibrillation 60 tablet 5     atorvastatin (LIPITOR) 20 MG tablet Take 1 tablet by mouth Every Night. NEED TO SEE PROVIDER FOR FUTURE REFILLS. 30 tablet 0     bumetanide (BUMEX) 2 MG tablet Take 1 tablet by mouth 3 (Three) Times a Day.       Cholecalciferol 25 MCG (1000 UT) tablet Take 1 tablet by mouth Every 7 (Seven) Days.       clopidogrel (PLAVIX) 75 MG tablet Take 1 tablet by mouth Daily. 90 tablet 1     glipizide (GLUCOTROL) 5 MG tablet Take 0.5 tablets by mouth 2 (Two) Times a Day Before Meals. Indications: Type 2 Diabetes       glucose blood (Accu-Chek Keshia Plus) test strip USE TO TEST BLOOD SUGAR    TWICE DAILY FOR DIABETES 100 each 8     hydrALAZINE (APRESOLINE) 10 MG tablet Take 1 tablet by mouth 3 (Three) Times a Day.       insulin lispro (HUMALOG/ADMELOG) 100 UNIT/ML injection Inject 2-9 Units under the skin into the appropriate area as directed 4 (Four) Times a Day Before Meals & at Bedtime.       linagliptin (Tradjenta) 5 MG tablet tablet Take 1 tablet by mouth Daily. 90 tablet 1     melatonin 5 MG tablet tablet Take 1 tablet by mouth Every Night.       metoprolol succinate XL (TOPROL-XL) 25 MG 24 hr tablet Take 1 tablet by mouth Daily. 30 tablet 5     nitroglycerin (NITROSTAT) 0.4 MG SL tablet Place 1 tablet under the tongue Every 5 (Five) Minutes As Needed for Chest Pain (Only if SBP Greater Than 100). Take no more than 3 doses in 15 minutes. 25 tablet 0     ondansetron ODT (ZOFRAN-ODT) 4 MG disintegrating tablet Take 1 tablet by mouth Every 6 (Six) Hours As  "Needed for Nausea or Vomiting.       sevelamer (RENVELA) 800 MG tablet Take 1 tablet by mouth 3 (Three) Times a Day With Meals.       terazosin (HYTRIN) 2 MG capsule Take 1 capsule by mouth Every Night. 90 capsule 1     vitamin C (ASCORBIC ACID) 250 MG tablet Take 1 tablet by mouth Daily.       Zinc 50 MG tablet       Nursing states having difficulty getting med list from facility    Allergies:  Ceclor [cefaclor]    Review of Systems:  Wound on foot, h/o amputation, using wheelchair, states cannot eat other fasicilty food by so bad   Negative for following (except as per HPI):  Constitution: chills, fevers,   Eyes: change of vision, loss of vision and discharge  ENT: ear drainage, ear ringing and facial trauma  Respiratory: cough, pleuritic pain, shortness of air  Cardiovascular: chest pressure, pain, lower extremity edema, palpitations  Gastrointestinal: constipation, diarrhea, nausea, vomiting, pain    Integument: rash and wound  Hematologic / Lymphatic: excessive bleeding and easy bruising  Musculoskeletal: joint pain, joint stiffness, joint swelling and muscle pain  Neurological: headaches, numbness, seizures and tremors  Behavioral / Psych: anxiety, depression and hallucinations         Vital Signs  Temp:  [97 °F (36.1 °C)-98.6 °F (37 °C)] 98.6 °F (37 °C)  Heart Rate:  [65-75] 67  Resp:  [16-18] 16  BP: (112-139)/(40-66) 132/52  Flowsheet Rows      Flowsheet Row First Filed Value   Admission Height 185.4 cm (72.99\") Documented at 05/21/2025 1339   Admission Weight 119 kg (262 lb 5.6 oz) Documented at 05/21/2025 1339           Body mass index is 34.62 kg/m².  [unfilled]    Physical Exam:  General Appearance:    Alert, cooperative, in no acute distress, elderly,    Head:    Normocephalic, without obvious abnormality, atraumatic   Eyes:         conjunctivae and sclerae normal, no icterus, PERRLA   ENT:    Ears grossly intact, oral mucosa moist,   Neck:   No adenopathy, supple, trachea midline,    Back:     Normal " to inspection, range of motion normal   Lungs:     Clear to auscultation,respirations regular, even and                   unlabored    Heart:    Regular rhythm and normal rate,  high pitched RSB holosyt murmur, normal S1 and S2,   Abdomen:     Normal bowel sounds, no masses,  soft non-tender, non-distended,    Extremities:   Moves all extremities well, no cyanosis, left transmetatarsal foot amputation, wound VAC was on the right lateral foot           Pulses:   Pulses palpable and equal bilaterally   Skin:   No bleeding, rash, bruising    Neurologic:    Psych:   Cranial nerves 2 - 12 grossly intact, sensation grossly intact,     Moves all extremities well, equal bilateral strength    Alert and Oriented x 3, Normal Affect    I washed/sanitized my hands before entering the room and immediately upon leaving the room.    LABS:  Admission on 05/21/2025   Component Date Value Ref Range Status    Glucose 05/21/2025 110 (H)  65 - 99 mg/dL Final    BUN 05/21/2025 27 (H)  8 - 23 mg/dL Final    Creatinine 05/21/2025 2.89 (H)  0.76 - 1.27 mg/dL Final    Sodium 05/21/2025 134 (L)  136 - 145 mmol/L Final    Potassium 05/21/2025 3.9  3.5 - 5.2 mmol/L Final    Chloride 05/21/2025 94 (L)  98 - 107 mmol/L Final    CO2 05/21/2025 32.0 (H)  22.0 - 29.0 mmol/L Final    Calcium 05/21/2025 8.5 (L)  8.6 - 10.5 mg/dL Final    Total Protein 05/21/2025 6.9  6.0 - 8.5 g/dL Final    Albumin 05/21/2025 2.9 (L)  3.5 - 5.2 g/dL Final    ALT (SGPT) 05/21/2025 9  1 - 41 U/L Final    AST (SGOT) 05/21/2025 12  1 - 40 U/L Final    Alkaline Phosphatase 05/21/2025 92  39 - 117 U/L Final    Total Bilirubin 05/21/2025 0.3  0.0 - 1.2 mg/dL Final    Globulin 05/21/2025 4.0  gm/dL Final    A/G Ratio 05/21/2025 0.7  g/dL Final    BUN/Creatinine Ratio 05/21/2025 9.3  7.0 - 25.0 Final    Anion Gap 05/21/2025 8.0  5.0 - 15.0 mmol/L Final    eGFR 05/21/2025 21.3 (L)  >60.0 mL/min/1.73 Final    ABO Type 05/21/2025 O   Final    RH type 05/21/2025 Positive   Final     Antibody Screen 05/21/2025 Negative   Final    T&S Expiration Date 05/21/2025 5/24/2025 11:59:59 PM   Final    WBC 05/21/2025 5.94  3.40 - 10.80 10*3/mm3 Final    RBC 05/21/2025 2.18 (L)  4.14 - 5.80 10*6/mm3 Final    Hemoglobin 05/21/2025 6.6 (C)  13.0 - 17.7 g/dL Final    Hematocrit 05/21/2025 21.4 (L)  37.5 - 51.0 % Final    MCV 05/21/2025 98.2 (H)  79.0 - 97.0 fL Final    MCH 05/21/2025 30.3  26.6 - 33.0 pg Final    MCHC 05/21/2025 30.8 (L)  31.5 - 35.7 g/dL Final    RDW 05/21/2025 14.7  12.3 - 15.4 % Final    RDW-SD 05/21/2025 53.3  37.0 - 54.0 fl Final    MPV 05/21/2025 9.0  6.0 - 12.0 fL Final    Platelets 05/21/2025 294  140 - 450 10*3/mm3 Final    Neutrophil % 05/21/2025 74.4  42.7 - 76.0 % Final    Lymphocyte % 05/21/2025 9.9 (L)  19.6 - 45.3 % Final    Monocyte % 05/21/2025 7.7  5.0 - 12.0 % Final    Eosinophil % 05/21/2025 7.4 (H)  0.3 - 6.2 % Final    Basophil % 05/21/2025 0.3  0.0 - 1.5 % Final    Immature Grans % 05/21/2025 0.3  0.0 - 0.5 % Final    Neutrophils, Absolute 05/21/2025 4.41  1.70 - 7.00 10*3/mm3 Final    Lymphocytes, Absolute 05/21/2025 0.59 (L)  0.70 - 3.10 10*3/mm3 Final    Monocytes, Absolute 05/21/2025 0.46  0.10 - 0.90 10*3/mm3 Final    Eosinophils, Absolute 05/21/2025 0.44 (H)  0.00 - 0.40 10*3/mm3 Final    Basophils, Absolute 05/21/2025 0.02  0.00 - 0.20 10*3/mm3 Final    Immature Grans, Absolute 05/21/2025 0.02  0.00 - 0.05 10*3/mm3 Final    nRBC 05/21/2025 0.0  0.0 - 0.2 /100 WBC Final    Extra Tube 05/21/2025 Hold for add-ons.   Final    Auto resulted.    Extra Tube 05/21/2025 hold for add-on   Final    Auto resulted    Extra Tube 05/21/2025 Hold for add-ons.   Final    Auto resulted.    Extra Tube 05/21/2025 Hold for add-ons.   Final    Auto resulted    Product Code 05/21/2025 [id number]   Final    Unit Number 05/21/2025 [id number]   Final    UNIT  ABO 05/21/2025 O   Final    UNIT  RH 05/21/2025 POS   Final    Crossmatch Interpretation 05/21/2025 Compatible   Final     Dispense Status 05/21/2025 IS   Final    Blood Expiration Date 05/21/2025 [id number]   Final    Blood Type Barcode 05/21/2025 5100   Final         DIAGNOSTICS:  No radiology results for the last day         Results Review:   I reviewed the patient's new clinical results.  Discussed with ER physician  Old records reviewed / Medical Decision Making High Complexity  I personally viewed and interpreted the patient's EKG/Telemetry data- sinus rhythm    11/2023     Left ventricular ejection fraction appears to be 61 - 65%.    Left ventricular diastolic function was indeterminate.    The left atrial cavity is moderately dilated.    Left atrial volume is moderately increased.    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    Estimated right ventricular systolic pressure from tricuspid regurgitation is moderately elevated (45-55 mmHg).    ASSESSMENT AND PLAN    Anemia    Essential hypertension    Type 2 diabetes mellitus with circulatory disorder, without long-term current use of insulin    SHASTA (obstructive sleep apnea)    H/O aortic valve replacement with porcine valve    Amputated toe of right foot    S/P CABG x 2    Amputated toe of left foot    Atrial fibrillation, persistent    Persistent atrial fibrillation    CKD (chronic kidney disease) stage 4, GFR 15-29 ml/min    Nocturnal hypoxemia    Unsteady gait when walking    Chronic heart failure with preserved ejection fraction (HFpEF)    GI bleed    Peritoneal dialysis status    Hypoalbuminemia     Anemia of chronic disease/CKD and heme + and   -hgb down to 7.3 03/14/2025.  Status post 1 unit of PRBC transfused.  -last colonoscopy pt states 10-12 years ago at [facility] - our EHR did not go back that far.   - As above transfusion 1 unit pRBCs patient Jessi Mark 18.  Patient earlier that he was 7.3.  Likely to be multifactorial of chronic inflammation and infection, chronic disease, he is on peritoneal dialysis, and given the albumin being 2.9 likely to have poor  nutrition at rehab facility.  Given the MCV being on the high end we will check RBC folic acid and B12.  Patient admits to not eating well lately.  -Patient does not make much urine appears to still be on Bumex 3 times daily.  Patient states that when they changed him that he has been having hematuria for 2 weeks.  Nursing said there was dried blood around opening of penis   Transfuse if <7  -conustl GI and urology     - CKD 4  -Multifactorial secondary to rhabdomyolysis,rerenal insult related to diuretics, ATN  On peritoneal dialysis 3 times a week-consult renal     Recent - Osteomyelitis and abscess of the right foot  -  PAD status post left foot amputation.   -s/p I&D and partial 4th metatarsal excision on 3/10/25 by Dr. Mchugh and then additional debridement of nonviable necrotic tissue on 3/13/25.   -Infectious disease evaluated, was treated with IV  daptomycin and unasyn while hospitalized and discharged on renally dosed Augmentin and linezolid to complete 2 weeks of antibiotics with last day of antibiotics on 03/24/2025 per infectious disease recommendations.  -wound care- unsure about wound vac.     Type 2 diabetes  -hemoglobin A1c is 6.  -Decreased glipizide to 2.5 twice daily at discharge to decrease risk of hypoglycemia.  Continue outpatient linagliptin.    Sliding scale insulin, hypoglycemia protocol      - Chronic afib (h/o cardioversion 10/2024)  Continue diastolic heart failure / CAD  -Continue outpatient Plavix, statin, metoprolol    Dizzy h/o  and heart murmur, h/o valve surg  Check echo     hypoalbuminemia  - Likely secondary to chronic infection and illness but also secondary to decreased poor low intake at facility patient says that the food wound that they are set him Nepro but they were out of it    Right thigh possible contact from us versus zoster will try steroid cream as patient also seemed to have some contact dermatitis under right upper arm/tricep area and some itching over his back  which could just be dry skin there.    HOME MEDS HELD: plavix and eliquis  -Restart when clinically appropriate      Chronic medical conditions being monitored- stable, continue medical management  -hypocalcemia will check ionized ca  -obese    +DVT PROPH:   scd  hold eliquis in case need procedure   + CODE STATUS: Full   May need to have a goals of care discussion.  Sister is medical decision-maker  I discussed the patient's findings and my recommendations with the patient and/or family.  Please reference all orders placed.    Maria Isabel Parisi MD  05/21/25  16:23 EDT      This document is intended for medical expert use only. Reading of this document by patients and/or patient's family without participating in medical staff guidance may result in misinterpretation and unintended morbidity. Any interpretation of such data is the responsibility of the patient and/or family member responsible for the patient in concert with their primary or specialist providers, and NOT to be left for sources of online searches such as [employer] or similar queries. Relying on these approaches to knowledge may result in misinterpretation, misguided goals of care and even death should patients or family members try recommendations outside of the realm of professional medical care in a supervised way.    Dictated utilizing Voice dictation:  Parts of this note may be an electronic transcription/translation of spoke language to printed text using Dragon dictation system.

## 2025-05-21 NOTE — ED PROVIDER NOTES
MD ATTESTATION NOTE    The LIANNE and I have discussed this patient's history, physical exam, MDM, and treatment plan.  I have reviewed the documentation and personally had a face to face interaction with the patient. I affirm the documentation and agree with the treatment and plan.  The attached note describes my personal findings.      I provided a substantive portion of the medical decision making.        Brief HPI: 79-year-old male, history of ESRD on HD Monday Tuesday Wednesday Thursday Friday, recent fall with rhabdomyolysis, wound VAC on right foot, presents to the ED for evaluation of low hemoglobin on routine blood work.    Patient last dialyzed this morning without complication    PHYSICAL EXAM  ED Triage Vitals   Temp Heart Rate Resp BP SpO2   05/21/25 1212 05/21/25 1212 05/21/25 1212 05/21/25 1212 05/21/25 1212   97 °F (36.1 °C) 70 18 133/45 99 %      Temp src Heart Rate Source Patient Position BP Location FiO2 (%)   05/21/25 1212 05/21/25 1212 05/21/25 1402 05/21/25 1402 --   Oral Monitor Lying Right arm          GENERAL: no acute distress, chronically ill-appearing  HENT: nares patent  EYES: no scleral icterus  CV: regular rhythm, normal rate  RESPIRATORY: normal effort  ABDOMEN: soft, nontender nondistended   MUSCULOSKELETAL: no deformity, wound VAC present to right foot  NEURO: alert, moves all extremities, follows commands  PSYCH:  calm, cooperative  SKIN: warm, dry, TDC to right anterior chest wall surrounding skin well-appearing     Vital signs and nursing notes reviewed.      Critical care provider statement:    Critical care time (minutes): 32.   Critical care time was exclusive of:  Separately billable procedures and treating other patients   Critical care was necessary to treat or prevent imminent or life-threatening deterioration of the following conditions:  Circulatory Failure   Critical care was time spent personally by me on the following activities:  Development of treatment plan with  patient or surrogate, discussions with consultants, evaluation of patient's response to treatment, examination of patient, obtaining history from patient or surrogate, ordering and performing treatments and interventions, ordering and review of laboratory studies, ordering and review of radiographic studies, pulse oximetry, re-evaluation of patient's condition and review of old charts. Critical Care indicators: Blood loss, PRBCs hung, GI Bleed     Will transfuse patient 2 units PRBC due to history of CAD/CABG with hemoglobin goal greater than 8    Medical Decision Making:  ED Course as of 05/21/25 1624   Wed May 21, 2025   1348 Hemoglobin(!!): 6.6 [CV]   1454 Positive stool guaiac [CV]   1533 Patient started on pantoprazole and 2 units of blood. [CV]   1542 BUN(!): 27 [CV]   1542 Creatinine(!): 2.89  Improved from 2 weeks prior [CV]   1610 Consulted with Dr. Leyva,  with A    We discussed the patient's history and physical exam, along with pertinent lab and imaging findings.  Patient to be admitted for further treatment and evaluation [CV]      ED Course User Index  [CV] Prosper Koo PA-C        Diagnosis Plan   1. Acute blood loss anemia        2. Rectal bleeding        3. ESRD on hemodialysis            ED Disposition       ED Disposition   Decision to Admit    Condition   --    Comment   Level of Care: Telemetry [5]   Diagnosis: GI bleed [[id number]]   Admitting Physician: YANET LEYVA [[id number]]   Attending Physician: YANET LEYVA [[id number]]   Certification: I Certify That Inpatient Hospital Services Are Medically Necessary For Greater Than 2 Midnights                    Haja Lo MD  05/21/25 1455       Haja Lo MD  05/21/25 [id number]

## 2025-05-21 NOTE — ED NOTES
Nursing report ED to floor  Rock Maciel Jr.  79 y.o.  male    HPI :  HPI  Stated Reason for Visit: low hgb  History Obtained From: patient, EMS    Chief Complaint  Chief Complaint   Patient presents with    Abnormal Lab       Admitting doctor:   Maria Isabel Parisi MD    Admitting diagnosis:   The primary encounter diagnosis was Rectal bleeding. Diagnoses of Anemia, unspecified type and ESRD on hemodialysis were also pertinent to this visit.    Code status:   Current Code Status       Date Active Code Status Order ID Comments User Context       Prior            Allergies:   Ceclor [cefaclor]    Isolation:   No active isolations    Intake and Output  No intake or output data in the 24 hours ending 05/21/25 1623    Weight:       05/21/25  1339   Weight: 119 kg (262 lb 5.6 oz)       Most recent vitals:   Vitals:    05/21/25 1500 05/21/25 1501 05/21/25 1531 05/21/25 1602   BP: 131/43 115/46 112/66 132/52   BP Location:       Patient Position:       Pulse: 68 67 65 67   Resp: 16      Temp: 98.6 °F (37 °C)      TempSrc:       SpO2: 99% 100% 100% 100%   Weight:       Height:           Active LDAs/IV Access:   Lines, Drains & Airways       Active LDAs       Name Placement date Placement time Site Days    Peripheral IV 05/21/25 1350 20 G Right Antecubital 05/21/25  1350  Antecubital  less than 1    Hemodialysis Cath Double 03/11/25  1628  Subclavian  70                    Labs (abnormal labs have a star):   Labs Reviewed   COMPREHENSIVE METABOLIC PANEL - Abnormal; Notable for the following components:       Result Value    Glucose 110 (*)     BUN 27 (*)     Creatinine 2.89 (*)     Sodium 134 (*)     Chloride 94 (*)     CO2 32.0 (*)     Calcium 8.5 (*)     Albumin 2.9 (*)     eGFR 21.3 (*)     All other components within normal limits    Narrative:     GFR Categories in Chronic Kidney Disease (CKD)              GFR Category          GFR (mL/min/1.73)    Interpretation  G1                    90 or greater        Normal  or high (1)  G2                    60-89                Mild decrease (1)  G3a                   45-59                Mild to moderate decrease  G3b                   30-44                Moderate to severe decrease  G4                    15-29                Severe decrease  G5                    14 or less           Kidney failure    (1)In the absence of evidence of kidney disease, neither GFR category G1 or G2 fulfill the criteria for CKD.    eGFR calculation 2021 CKD-EPI creatinine equation, which does not include race as a factor   CBC WITH AUTO DIFFERENTIAL - Abnormal; Notable for the following components:    RBC 2.18 (*)     Hemoglobin 6.6 (*)     Hematocrit 21.4 (*)     MCV 98.2 (*)     MCHC 30.8 (*)     Lymphocyte % 9.9 (*)     Eosinophil % 7.4 (*)     Lymphocytes, Absolute 0.59 (*)     Eosinophils, Absolute 0.44 (*)     All other components within normal limits   RAINBOW DRAW    Narrative:     The following orders were created for panel order [place] Draw.  Procedure                               Abnormality         Status                     ---------                               -----------         ------                     Green Top (Gel)[[id number]]                                  Final result               Lavender Top[[id number]]                                     Final result               Gold Top - SST[[id number]]                                   Final result               Light Blue Top[[id number]]                                   Final result                 Please view results for these tests on the individual orders.   VITAMIN B12   FOLATE RBC   FERRITIN   IRON   IRON PROFILE   RETICULOCYTES   PROTIME-INR   CK   C-REACTIVE PROTEIN   SEDIMENTATION RATE   TYPE AND SCREEN   PREPARE RBC   CBC AND DIFFERENTIAL    Narrative:     The following orders were created for panel order CBC & Differential.  Procedure                               Abnormality         Status                     ---------                                -----------         ------                     CBC Auto Differential[[id number]]        Abnormal            Final result                 Please view results for these tests on the individual orders.   GREEN TOP   LAVENDER TOP   GOLD TOP - SST   LIGHT BLUE TOP       EKG:   No orders to display       Meds given in ED:   Medications   sodium chloride 0.9 % flush 10 mL (has no administration in time range)   pantoprazole (PROTONIX) injection 80 mg (80 mg Intravenous Given 5/21/25 0344)       Imaging results:  No radiology results for the last day    Ambulatory status:   - assist x2    Social issues:   Social History     Socioeconomic History    Marital status:     Number of children: 1   Tobacco Use    Smoking status: Never     Passive exposure: Never    Smokeless tobacco: Never   Vaping Use    Vaping status: Never Used   Substance and Sexual Activity    Alcohol use: Yes     Alcohol/week: 7.0 standard drinks of alcohol     Types: 7 Drinks containing 0.5 oz of alcohol per week     Comment: either one glass wine or one glass liquor per  day    Drug use: Never    Sexual activity: Defer       Peripheral Neurovascular  Peripheral Neurovascular (Adult)  Peripheral Neurovascular WDL: WDL    Neuro Cognitive  Neuro Cognitive (Adult)  Cognitive/Neuro/Behavioral WDL: WDL    Learning  Learning Assessment  Learning Readiness and Ability: no barriers identified    Respiratory  Respiratory  Airway WDL: WDL  Respiratory WDL  Respiratory WDL: WDL    Abdominal Pain       Pain Assessments  Pain (Adult)  (0-10) Pain Rating: Rest: 0    NIH Stroke Scale       Joanne Florence RN  05/21/25 16:23 EDT

## 2025-05-21 NOTE — PLAN OF CARE
Goal Outcome Evaluation:  Plan of Care Reviewed With: patient        Progress: no change  Outcome Evaluation: Pt AOx4. On 2L NC. NSR on monitor. 1 unit of PRBCs completed and redraw at 2100. WOCCN order placed- Rt foot wound vac removed and saline wet to dry dressing changed. q2 turns. All wound pictures added. Heel boots placed. SOA on excertion. Nephrology consulted now and possibly GI. Med list not completed d/t rehab erwin not answering phone. Changed tunnel catheter dressing with alexa ji. Pt denies pain. Plan of care ongoing

## 2025-05-22 ENCOUNTER — INPATIENT HOSPITAL (OUTPATIENT)
Dept: URBAN - METROPOLITAN AREA HOSPITAL 113 | Facility: HOSPITAL | Age: 80
End: 2025-05-22
Payer: MEDICARE

## 2025-05-22 DIAGNOSIS — D64.9 ANEMIA, UNSPECIFIED: ICD-10-CM

## 2025-05-22 DIAGNOSIS — R19.5 OTHER FECAL ABNORMALITIES: ICD-10-CM

## 2025-05-22 PROBLEM — E44.0 MODERATE PROTEIN-CALORIE MALNUTRITION: Status: ACTIVE | Noted: 2025-05-22

## 2025-05-22 LAB
ALBUMIN SERPL-MCNC: 2.6 G/DL (ref 3.5–5.2)
ALBUMIN/GLOB SERPL: 0.7 G/DL
ALP SERPL-CCNC: 77 U/L (ref 39–117)
ALT SERPL W P-5'-P-CCNC: 9 U/L (ref 1–41)
AMORPH URATE CRY URNS QL MICRO: PRESENT /HPF
ANION GAP SERPL CALCULATED.3IONS-SCNC: 9 MMOL/L (ref 5–15)
AST SERPL-CCNC: 13 U/L (ref 1–40)
BACTERIA UR QL AUTO: ABNORMAL /HPF
BASOPHILS # BLD AUTO: 0.02 10*3/MM3 (ref 0–0.2)
BASOPHILS NFR BLD AUTO: 0.4 % (ref 0–1.5)
BH BB BLOOD EXPIRATION DATE: NORMAL
BH BB BLOOD TYPE BARCODE: 5100
BH BB DISPENSE STATUS: NORMAL
BH BB PRODUCT CODE: NORMAL
BH BB UNIT NUMBER: NORMAL
BILIRUB SERPL-MCNC: 0.4 MG/DL (ref 0–1.2)
BILIRUB UR QL STRIP: NEGATIVE
BUN SERPL-MCNC: 30 MG/DL (ref 8–23)
BUN/CREAT SERPL: 8.3 (ref 7–25)
CA-I SERPL ISE-MCNC: 1.17 MMOL/L (ref 1.15–1.35)
CALCIUM SPEC-SCNC: 8.2 MG/DL (ref 8.6–10.5)
CHLORIDE SERPL-SCNC: 96 MMOL/L (ref 98–107)
CLARITY UR: ABNORMAL
CO2 SERPL-SCNC: 30 MMOL/L (ref 22–29)
COLOR UR: ABNORMAL
CREAT SERPL-MCNC: 3.6 MG/DL (ref 0.76–1.27)
CROSSMATCH INTERPRETATION: NORMAL
DEPRECATED RDW RBC AUTO: 52.2 FL (ref 37–54)
EGFRCR SERPLBLD CKD-EPI 2021: 16.4 ML/MIN/1.73
EOSINOPHIL # BLD AUTO: 0.52 10*3/MM3 (ref 0–0.4)
EOSINOPHIL NFR BLD AUTO: 9.4 % (ref 0.3–6.2)
ERYTHROCYTE [DISTWIDTH] IN BLOOD BY AUTOMATED COUNT: 15.7 % (ref 12.3–15.4)
GLOBULIN UR ELPH-MCNC: 3.7 GM/DL
GLUCOSE BLDC GLUCOMTR-MCNC: 138 MG/DL (ref 70–130)
GLUCOSE BLDC GLUCOMTR-MCNC: 163 MG/DL (ref 70–130)
GLUCOSE BLDC GLUCOMTR-MCNC: 169 MG/DL (ref 70–130)
GLUCOSE BLDC GLUCOMTR-MCNC: 60 MG/DL (ref 70–130)
GLUCOSE BLDC GLUCOMTR-MCNC: 88 MG/DL (ref 70–130)
GLUCOSE SERPL-MCNC: 50 MG/DL (ref 65–99)
GLUCOSE UR STRIP-MCNC: NEGATIVE MG/DL
HBV SURFACE AG SERPL QL IA: NORMAL
HCT VFR BLD AUTO: 19.5 % (ref 37.5–51)
HCT VFR BLD AUTO: 23.3 % (ref 37.5–51)
HGB BLD-MCNC: 6.6 G/DL (ref 13–17.7)
HGB BLD-MCNC: 7.6 G/DL (ref 13–17.7)
HGB UR QL STRIP.AUTO: ABNORMAL
HYALINE CASTS UR QL AUTO: ABNORMAL /LPF
IMM GRANULOCYTES # BLD AUTO: 0.03 10*3/MM3 (ref 0–0.05)
IMM GRANULOCYTES NFR BLD AUTO: 0.5 % (ref 0–0.5)
KETONES UR QL STRIP: NEGATIVE
LEUKOCYTE ESTERASE UR QL STRIP.AUTO: ABNORMAL
LYMPHOCYTES # BLD AUTO: 0.78 10*3/MM3 (ref 0.7–3.1)
LYMPHOCYTES NFR BLD AUTO: 14 % (ref 19.6–45.3)
MAGNESIUM SERPL-MCNC: 2 MG/DL (ref 1.6–2.4)
MCH RBC QN AUTO: 31.1 PG (ref 26.6–33)
MCHC RBC AUTO-ENTMCNC: 33.8 G/DL (ref 31.5–35.7)
MCV RBC AUTO: 92 FL (ref 79–97)
MONOCYTES # BLD AUTO: 0.42 10*3/MM3 (ref 0.1–0.9)
MONOCYTES NFR BLD AUTO: 7.6 % (ref 5–12)
NEUTROPHILS NFR BLD AUTO: 3.79 10*3/MM3 (ref 1.7–7)
NEUTROPHILS NFR BLD AUTO: 68.1 % (ref 42.7–76)
NITRITE UR QL STRIP: POSITIVE
NRBC BLD AUTO-RTO: 0 /100 WBC (ref 0–0.2)
PH UR STRIP.AUTO: <=5 [PH] (ref 5–8)
PLATELET # BLD AUTO: 223 10*3/MM3 (ref 140–450)
PMV BLD AUTO: 9.1 FL (ref 6–12)
POTASSIUM SERPL-SCNC: 3.5 MMOL/L (ref 3.5–5.2)
PROT SERPL-MCNC: 6.3 G/DL (ref 6–8.5)
PROT UR QL STRIP: ABNORMAL
RBC # BLD AUTO: 2.12 10*6/MM3 (ref 4.14–5.8)
RBC # UR STRIP: ABNORMAL /HPF
REF LAB TEST METHOD: ABNORMAL
SODIUM SERPL-SCNC: 135 MMOL/L (ref 136–145)
SP GR UR STRIP: 1.02 (ref 1–1.03)
SQUAMOUS #/AREA URNS HPF: ABNORMAL /HPF
T4 FREE SERPL-MCNC: 1.38 NG/DL (ref 0.92–1.68)
TSH SERPL DL<=0.05 MIU/L-ACNC: 4.48 UIU/ML (ref 0.27–4.2)
UNIT  ABO: NORMAL
UNIT  RH: NORMAL
UROBILINOGEN UR QL STRIP: ABNORMAL
WBC # UR STRIP: ABNORMAL /HPF
WBC NRBC COR # BLD AUTO: 5.56 10*3/MM3 (ref 3.4–10.8)

## 2025-05-22 PROCEDURE — 87340 HEPATITIS B SURFACE AG IA: CPT | Performed by: INTERNAL MEDICINE

## 2025-05-22 PROCEDURE — 84439 ASSAY OF FREE THYROXINE: CPT | Performed by: INTERNAL MEDICINE

## 2025-05-22 PROCEDURE — 86900 BLOOD TYPING SEROLOGIC ABO: CPT

## 2025-05-22 PROCEDURE — 3E1K78Z IRRIGATION OF GENITOURINARY TRACT USING IRRIGATING SUBSTANCE, VIA NATURAL OR ARTIFICIAL OPENING: ICD-10-PCS

## 2025-05-22 PROCEDURE — 81001 URINALYSIS AUTO W/SCOPE: CPT

## 2025-05-22 PROCEDURE — 83735 ASSAY OF MAGNESIUM: CPT | Performed by: INTERNAL MEDICINE

## 2025-05-22 PROCEDURE — 80053 COMPREHEN METABOLIC PANEL: CPT | Performed by: INTERNAL MEDICINE

## 2025-05-22 PROCEDURE — 84443 ASSAY THYROID STIM HORMONE: CPT | Performed by: INTERNAL MEDICINE

## 2025-05-22 PROCEDURE — 63710000001 INSULIN LISPRO (HUMAN) PER 5 UNITS: Performed by: INTERNAL MEDICINE

## 2025-05-22 PROCEDURE — 82330 ASSAY OF CALCIUM: CPT | Performed by: INTERNAL MEDICINE

## 2025-05-22 PROCEDURE — 82948 REAGENT STRIP/BLOOD GLUCOSE: CPT

## 2025-05-22 PROCEDURE — 99223 1ST HOSP IP/OBS HIGH 75: CPT | Performed by: INTERNAL MEDICINE

## 2025-05-22 PROCEDURE — 85018 HEMOGLOBIN: CPT | Performed by: INTERNAL MEDICINE

## 2025-05-22 PROCEDURE — 85025 COMPLETE CBC W/AUTO DIFF WBC: CPT | Performed by: INTERNAL MEDICINE

## 2025-05-22 PROCEDURE — 5A1D70Z PERFORMANCE OF URINARY FILTRATION, INTERMITTENT, LESS THAN 6 HOURS PER DAY: ICD-10-PCS | Performed by: INTERNAL MEDICINE

## 2025-05-22 PROCEDURE — P9016 RBC LEUKOCYTES REDUCED: HCPCS

## 2025-05-22 PROCEDURE — 85014 HEMATOCRIT: CPT | Performed by: INTERNAL MEDICINE

## 2025-05-22 PROCEDURE — 82747 ASSAY OF FOLIC ACID RBC: CPT | Performed by: INTERNAL MEDICINE

## 2025-05-22 PROCEDURE — 87086 URINE CULTURE/COLONY COUNT: CPT

## 2025-05-22 PROCEDURE — 0T9B70Z DRAINAGE OF BLADDER WITH DRAINAGE DEVICE, VIA NATURAL OR ARTIFICIAL OPENING: ICD-10-PCS

## 2025-05-22 RX ORDER — GLIPIZIDE 5 MG/1
2.5 TABLET ORAL
Status: DISCONTINUED | OUTPATIENT
Start: 2025-05-22 | End: 2025-05-28

## 2025-05-22 RX ORDER — BUMETANIDE 1 MG/1
2 TABLET ORAL 3 TIMES DAILY
Status: DISCONTINUED | OUTPATIENT
Start: 2025-05-22 | End: 2025-06-04 | Stop reason: HOSPADM

## 2025-05-22 RX ORDER — PANTOPRAZOLE SODIUM 40 MG/10ML
40 INJECTION, POWDER, LYOPHILIZED, FOR SOLUTION INTRAVENOUS
Status: DISCONTINUED | OUTPATIENT
Start: 2025-05-22 | End: 2025-05-27

## 2025-05-22 RX ORDER — TRAZODONE HYDROCHLORIDE 50 MG/1
75 TABLET ORAL NIGHTLY
COMMUNITY
End: 2025-06-03 | Stop reason: HOSPADM

## 2025-05-22 RX ORDER — BISACODYL 10 MG
10 SUPPOSITORY, RECTAL RECTAL DAILY PRN
COMMUNITY
End: 2025-06-03 | Stop reason: HOSPADM

## 2025-05-22 RX ORDER — METOPROLOL SUCCINATE 25 MG/1
25 TABLET, EXTENDED RELEASE ORAL DAILY
Status: DISCONTINUED | OUTPATIENT
Start: 2025-05-22 | End: 2025-06-04 | Stop reason: HOSPADM

## 2025-05-22 RX ORDER — SODIUM CHLORIDE 0.9 % (FLUSH) 0.9 %
10 SYRINGE (ML) INJECTION EVERY 12 HOURS SCHEDULED
Status: DISCONTINUED | OUTPATIENT
Start: 2025-05-22 | End: 2025-06-04 | Stop reason: HOSPADM

## 2025-05-22 RX ORDER — SENNOSIDES 8.6 MG/1
1 TABLET ORAL DAILY
COMMUNITY

## 2025-05-22 RX ORDER — POLYETHYLENE GLYCOL 3350 17 G/17G
17 POWDER, FOR SOLUTION ORAL DAILY
COMMUNITY

## 2025-05-22 RX ORDER — OXYCODONE AND ACETAMINOPHEN 5; 325 MG/1; MG/1
1 TABLET ORAL EVERY 4 HOURS PRN
Refills: 0 | Status: DISCONTINUED | OUTPATIENT
Start: 2025-05-22 | End: 2025-06-04 | Stop reason: HOSPADM

## 2025-05-22 RX ORDER — PHENAZOPYRIDINE HYDROCHLORIDE 100 MG/1
100 TABLET, FILM COATED ORAL 3 TIMES DAILY PRN
Status: DISCONTINUED | OUTPATIENT
Start: 2025-05-22 | End: 2025-05-22

## 2025-05-22 RX ORDER — HEPARIN SODIUM (PORCINE) LOCK FLUSH IV SOLN 100 UNIT/ML 100 UNIT/ML
3 SOLUTION INTRAVENOUS DAILY PRN
Status: DISCONTINUED | OUTPATIENT
Start: 2025-05-22 | End: 2025-06-04 | Stop reason: HOSPADM

## 2025-05-22 RX ORDER — CLOBETASOL PROPIONATE 0.5 MG/G
1 CREAM TOPICAL EVERY 12 HOURS SCHEDULED
Status: DISCONTINUED | OUTPATIENT
Start: 2025-05-22 | End: 2025-06-04 | Stop reason: HOSPADM

## 2025-05-22 RX ORDER — TERAZOSIN 2 MG/1
2 CAPSULE ORAL NIGHTLY
Status: DISCONTINUED | OUTPATIENT
Start: 2025-05-22 | End: 2025-05-23

## 2025-05-22 RX ORDER — HYDRALAZINE HYDROCHLORIDE 10 MG/1
10 TABLET, FILM COATED ORAL 3 TIMES DAILY
Status: DISCONTINUED | OUTPATIENT
Start: 2025-05-22 | End: 2025-06-04 | Stop reason: HOSPADM

## 2025-05-22 RX ORDER — OXYBUTYNIN CHLORIDE 5 MG/1
5 TABLET ORAL 3 TIMES DAILY PRN
Status: DISCONTINUED | OUTPATIENT
Start: 2025-05-22 | End: 2025-06-04 | Stop reason: HOSPADM

## 2025-05-22 RX ORDER — HYDROXYZINE HYDROCHLORIDE 25 MG/1
25 TABLET, FILM COATED ORAL 3 TIMES DAILY PRN
Status: DISCONTINUED | OUTPATIENT
Start: 2025-05-22 | End: 2025-06-04 | Stop reason: HOSPADM

## 2025-05-22 RX ORDER — VIT E ACET/GLY/DIMETH/WATER
LOTION (ML) TOPICAL EVERY 12 HOURS SCHEDULED
Status: DISCONTINUED | OUTPATIENT
Start: 2025-05-22 | End: 2025-06-04 | Stop reason: HOSPADM

## 2025-05-22 RX ORDER — ATORVASTATIN CALCIUM 20 MG/1
20 TABLET, FILM COATED ORAL NIGHTLY
Status: DISCONTINUED | OUTPATIENT
Start: 2025-05-22 | End: 2025-06-04 | Stop reason: HOSPADM

## 2025-05-22 RX ORDER — OXYCODONE AND ACETAMINOPHEN 5; 325 MG/1; MG/1
1 TABLET ORAL EVERY 6 HOURS PRN
Refills: 0 | Status: DISCONTINUED | OUTPATIENT
Start: 2025-05-22 | End: 2025-05-22

## 2025-05-22 RX ORDER — ASCORBIC ACID 500 MG
250 TABLET ORAL DAILY
Status: DISCONTINUED | OUTPATIENT
Start: 2025-05-22 | End: 2025-06-04 | Stop reason: HOSPADM

## 2025-05-22 RX ORDER — SODIUM HYPOCHLORITE 1.25 MG/ML
SOLUTION TOPICAL 2 TIMES DAILY
Status: DISCONTINUED | OUTPATIENT
Start: 2025-05-22 | End: 2025-06-04 | Stop reason: HOSPADM

## 2025-05-22 RX ORDER — LIDOCAINE 50 MG/G
1 PATCH TOPICAL EVERY 24 HOURS
COMMUNITY
End: 2025-06-03 | Stop reason: HOSPADM

## 2025-05-22 RX ORDER — SODIUM CHLORIDE 0.9 % (FLUSH) 0.9 %
20 SYRINGE (ML) INJECTION AS NEEDED
Status: DISCONTINUED | OUTPATIENT
Start: 2025-05-22 | End: 2025-06-04 | Stop reason: HOSPADM

## 2025-05-22 RX ORDER — AMIODARONE HYDROCHLORIDE 200 MG/1
200 TABLET ORAL DAILY
Status: DISCONTINUED | OUTPATIENT
Start: 2025-05-22 | End: 2025-06-04 | Stop reason: HOSPADM

## 2025-05-22 RX ORDER — DIPHENHYDRAMINE HCL 25 MG
25 CAPSULE ORAL 3 TIMES DAILY PRN
COMMUNITY
End: 2025-06-03 | Stop reason: HOSPADM

## 2025-05-22 RX ORDER — SEVELAMER CARBONATE 800 MG/1
800 TABLET, FILM COATED ORAL
Status: DISCONTINUED | OUTPATIENT
Start: 2025-05-22 | End: 2025-05-30

## 2025-05-22 RX ORDER — CLOPIDOGREL BISULFATE 75 MG/1
75 TABLET ORAL DAILY
Status: DISCONTINUED | OUTPATIENT
Start: 2025-05-22 | End: 2025-06-04 | Stop reason: HOSPADM

## 2025-05-22 RX ORDER — SODIUM CHLORIDE 0.9 % (FLUSH) 0.9 %
10 SYRINGE (ML) INJECTION AS NEEDED
Status: DISCONTINUED | OUTPATIENT
Start: 2025-05-22 | End: 2025-06-04 | Stop reason: HOSPADM

## 2025-05-22 RX ORDER — LIDOCAINE HYDROCHLORIDE 20 MG/ML
JELLY TOPICAL AS NEEDED
Status: DISCONTINUED | OUTPATIENT
Start: 2025-05-22 | End: 2025-06-04 | Stop reason: HOSPADM

## 2025-05-22 RX ORDER — SODIUM CHLORIDE 9 MG/ML
40 INJECTION, SOLUTION INTRAVENOUS AS NEEDED
Status: DISCONTINUED | OUTPATIENT
Start: 2025-05-22 | End: 2025-06-04 | Stop reason: HOSPADM

## 2025-05-22 RX ADMIN — PANTOPRAZOLE SODIUM 40 MG: 40 INJECTION, POWDER, FOR SOLUTION INTRAVENOUS at 10:20

## 2025-05-22 RX ADMIN — SEVELAMER CARBONATE 800 MG: 800 TABLET, FILM COATED ORAL at 10:29

## 2025-05-22 RX ADMIN — CLOBETASOL PROPIONATE CREAM USP, 0.05% 1 APPLICATION: 0.5 CREAM TOPICAL at 10:29

## 2025-05-22 RX ADMIN — TERAZOSIN 2 MG: 2 CAPSULE ORAL at 01:45

## 2025-05-22 RX ADMIN — MENTHOL, ZINC OXIDE 1 APPLICATION: .44; 20.6 OINTMENT TOPICAL at 21:30

## 2025-05-22 RX ADMIN — HYDROXYZINE HYDROCHLORIDE 25 MG: 25 TABLET, FILM COATED ORAL at 23:27

## 2025-05-22 RX ADMIN — TERAZOSIN 2 MG: 2 CAPSULE ORAL at 23:27

## 2025-05-22 RX ADMIN — MENTHOL, ZINC OXIDE 1 APPLICATION: .44; 20.6 OINTMENT TOPICAL at 15:33

## 2025-05-22 RX ADMIN — ANTI-FUNGAL POWDER MICONAZOLE NITRATE TALC FREE 1 APPLICATION: 1.42 POWDER TOPICAL at 21:29

## 2025-05-22 RX ADMIN — OXYCODONE AND ACETAMINOPHEN 1 TABLET: 5; 325 TABLET ORAL at 19:56

## 2025-05-22 RX ADMIN — Medication 10 ML: at 09:58

## 2025-05-22 RX ADMIN — CLOBETASOL PROPIONATE CREAM USP, 0.05% 1 APPLICATION: 0.5 CREAM TOPICAL at 21:29

## 2025-05-22 RX ADMIN — OXYCODONE AND ACETAMINOPHEN 1 TABLET: 5; 325 TABLET ORAL at 23:55

## 2025-05-22 RX ADMIN — Medication 5 MG: at 21:29

## 2025-05-22 RX ADMIN — PANTOPRAZOLE SODIUM 40 MG: 40 INJECTION, POWDER, FOR SOLUTION INTRAVENOUS at 20:01

## 2025-05-22 RX ADMIN — HYDRALAZINE HYDROCHLORIDE 10 MG: 10 TABLET ORAL at 21:29

## 2025-05-22 RX ADMIN — BUMETANIDE 2 MG: 1 TABLET ORAL at 19:56

## 2025-05-22 RX ADMIN — Medication 10 ML: at 21:29

## 2025-05-22 RX ADMIN — Medication 10 ML: at 10:21

## 2025-05-22 RX ADMIN — ATORVASTATIN CALCIUM 20 MG: 20 TABLET, FILM COATED ORAL at 21:34

## 2025-05-22 RX ADMIN — Medication 5 MG: at 00:19

## 2025-05-22 RX ADMIN — ATORVASTATIN CALCIUM 20 MG: 20 TABLET, FILM COATED ORAL at 01:33

## 2025-05-22 RX ADMIN — OXYCODONE HYDROCHLORIDE AND ACETAMINOPHEN 250 MG: 500 TABLET ORAL at 10:28

## 2025-05-22 RX ADMIN — Medication 10 ML: at 21:30

## 2025-05-22 RX ADMIN — AMIODARONE HYDROCHLORIDE 200 MG: 200 TABLET ORAL at 10:28

## 2025-05-22 RX ADMIN — SEVELAMER CARBONATE 800 MG: 800 TABLET, FILM COATED ORAL at 19:56

## 2025-05-22 RX ADMIN — ANTI-FUNGAL POWDER MICONAZOLE NITRATE TALC FREE 1 APPLICATION: 1.42 POWDER TOPICAL at 14:46

## 2025-05-22 RX ADMIN — OXYCODONE AND ACETAMINOPHEN 1 TABLET: 5; 325 TABLET ORAL at 11:41

## 2025-05-22 RX ADMIN — OXYCODONE AND ACETAMINOPHEN 1 TABLET: 5; 325 TABLET ORAL at 15:28

## 2025-05-22 RX ADMIN — BUMETANIDE 2 MG: 1 TABLET ORAL at 10:29

## 2025-05-22 RX ADMIN — OXYBUTYNIN CHLORIDE 5 MG: 5 TABLET ORAL at 11:41

## 2025-05-22 RX ADMIN — DAKIN'S SOLUTION 0.125% (QUARTER STRENGTH): 0.12 SOLUTION at 21:30

## 2025-05-22 RX ADMIN — Medication: at 21:29

## 2025-05-22 RX ADMIN — ACETAMINOPHEN 650 MG: 325 TABLET, FILM COATED ORAL at 00:19

## 2025-05-22 NOTE — DISCHARGE PLACEMENT REQUEST
"Madyson Maciel Jr. (79 y.o. Male)       Date of Birth   1945    Social Security Number       Address   [address] [place] [address] SIGNATURE OF [facility] [address]    Home Phone   [phone]    MRN   [id number]       Restorationist   Islam    Marital Status                               Admission Date   5/21/2025    Admission Type   Emergency    Admitting Provider   Maria Isabel Parisi MD    Attending Provider   Wesley Christianson MD    Department, Room/Bed   [address], [address]/1       Discharge Date       Discharge Disposition       Discharge Destination                                 Attending Provider: Wesley Christianson MD    Allergies: Ceclor [Cefaclor]    Isolation: Contact   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/08/25), Candida Auris (rule out) (05/21/25)   Code Status: CPR    Ht: 185.4 cm (72.99\")   Wt: 110 kg (241 lb 6.5 oz)    Admission Cmt: None   Principal Problem: Anemia [D64.9]                   Active Insurance as of 5/21/2025       Primary Coverage       Payor Plan Insurance Group Employer/Plan Group    ANTHEM MEDICARE REPLACEMENT ANTHEM MEDICARE ADVANTAGE HMO [id number]       Payor Plan Address Payor Plan Phone Number Payor Plan Fax Number Effective Dates    [address] [phone]  1/1/2025 - None Entered    [facility] [id number]         Subscriber Name Subscriber Birth Date Member ID       MADYSON MACIEL JR. 1945 [id number]                     Emergency Contacts        (Rel.) Home Phone Work Phone Mobile Phone    Claudette Maciel (Daughter) [phone] -- [phone]    RAJ MEJIA (Friend) [phone] -- [phone]                "

## 2025-05-22 NOTE — SIGNIFICANT NOTE
05/22/25 1007   OTHER   Discipline occupational therapist   Rehab Time/Intention   Session Not Performed other (see comments)  (noted Hgb 6.6 this AM, will hold at this time due to level outside therapeutic range. continue to follow for OT eval.)   Recommendation   OT - Next Appointment 05/23/25

## 2025-05-22 NOTE — NURSING NOTE
Hemodialysis treatment completed, vs stable throughout tx, 1 unit prbcs given as order was added while patient on hd, increased uf goal some, about 2.5L net fluid removed, no issues with access, locked with saline due to active bleeding, no complications

## 2025-05-22 NOTE — CONSULTS
[facility]   Consult Note    Patient Name: Rock Maciel Jr.  : 1945  MRN: [id number]  Primary Care Physician:  Denise Dickson APRN  Referring Physician: Maria Isabel Parisi, *  Date of admission: 2025    Inpatient Gastroenterology Consult  Consult performed by: Darrian Webb MD  Consult ordered by: Maria Isabel Parisi MD        Subjective   Subjective     Reason for Consult/ Chief Complaint:  anemia, Heme +     History of Present Illness  Rock Maciel Jr. is a 79 y.o. male  admitted with profound anemia.  He does have anemia of chronic disease. He is heme positive.  He has had osteomyelitis of the right foot.  He denies any black or bloody stools.  He has a fair appetite.  He had a colonoscopy over 10 years ago     Review of Systems   Respiratory:  Positive for shortness of breath.    Neurological:  Positive for weakness.        Personal History     Past Medical History:   Diagnosis Date   • Acquired absence of left foot 2022   • Acute osteomyelitis of right foot 2025   • Allergic rhinitis    • Amputated toe of left foot 2021   • Amputated toe of right foot 2019   • ATN (acute tubular necrosis) 2025   • Atrial fibrillation, persistent 10/18/2024   • Bladder wall thickening 2024   • Bronchitis    • Carotid stenosis, asymptomatic 2017   • Charcot-Davida-Tooth disease-like deformity of foot 2019   • Chronic heart failure with preserved ejection fraction (HFpEF) 2025   • CKD (chronic kidney disease) stage 4, GFR 15-29 ml/min 2024   • Constipation 2019   • Coronary artery disease    • Diabetes mellitus    • Diabetic foot infection 2025   • DM (diabetes mellitus)    • Encounter for annual health examination 2014    Annual Health Assessment   • Gas gangrene 10/25/2021    Formatting of this note might be different from the original.  Added automatically from request for surgery [id number]     • Gout    • H/O  aortic valve replacement with porcine valve 09/10/2018   • Hiatal hernia    • History of MRSA infection     RIGHT FOOT 2010   • Hyperlipidemia    • Hypertension    • LAFB (left anterior fascicular block) 05/17/2016   • MRSA (methicillin resistant Staphylococcus aureus) infection 03/08/2025   • Murmur    • Neuropathic arthropathy 05/17/2016    Overview:   2015 IMO UPDATE  Overview:   2015 IMO UPDATE     • Nocturnal hypoxemia 12/01/2024   • Nonrheumatic aortic valve stenosis 01/30/2017   • Persistent atrial fibrillation 11/07/2024   • Pneumothorax on right    • S/P CABG x 2 05/06/2019 July 2018     • Sleep apnea     pt wears CPAP at night   • Status post left inguinal hernia repair, follow-up exam 01/05/2025   • Traumatic rhabdomyolysis 03/03/2025   • Type 2 diabetes mellitus with circulatory disorder, without long-term current use of insulin 05/17/2016   • Unsteady gait when walking 01/05/2025   • Wellness examination 06/24/2015    Annual Wellness Visit       Past Surgical History:   Procedure Laterality Date   • ARTERY SURGERY Bilateral     carotid   • BONE EXCISION LEG Right 3/10/2025    Procedure: BONE EXCISION LOWER EXTERMITY, RIGHT;  Surgeon: Jimenez Mchugh DPM;  Location: [facility] OR;  Service: Podiatry;  Laterality: Right;   • CARDIAC CATHETERIZATION N/A 7/20/2018    Procedure: Left Heart Cath;  Surgeon: Jo Toney MD;  Location: [facility] CATH INVASIVE LOCATION;  Service: Cardiovascular   • CARDIAC CATHETERIZATION N/A 7/20/2018    Procedure: Coronary angiography;  Surgeon: Jo Toney MD;  Location: [facility] CATH INVASIVE LOCATION;  Service: Cardiovascular   • CAROTID ENDARTERECTOMY Bilateral    • COLONOSCOPY  2008   • CORONARY ARTERY BYPASS GRAFT WITH AORTIC VALVE REPAIR/REPLACEMENT N/A 7/23/2018    Procedure: INTRAOPERATIVE ALEX, MIDLINE STERNOTOMY, CORONARY ARTERY BYPASS GRAFTING X 3 USING ENDOSCOPICALLY HARVESTED LEFT GREATER SAPHENOUS VEIN,  AORTIC VALVE REPLACEMENT USING 25MM  LOPEZ II ULTRA PORCINE VALVE, PRP;  Surgeon: Phong Posey MD;  Location: [facility] OR;  Service: Cardiothoracic   • FOOT SURGERY Right 2010    5th digit removal   • FOOT SURGERY Left 2011    1 digit removed   • INCISION AND DRAINAGE LEG Right 3/28/2020    Procedure: DEBRIDMENT OF RIGHT CALF;  Surgeon: Ross Pineda MD;  Location: [facility] MAIN OR;  Service: Vascular;  Laterality: Right;   • INCISION AND DRAINAGE LEG Right 3/10/2025    Procedure: INCISION AND DRAINAGE LOWER EXTREMITY;  Surgeon: Jimenez Mchugh DPM;  Location: [facility] OR;  Service: Podiatry;  Laterality: Right;   • INGUINAL HERNIA REPAIR Left 11/29/2024    Procedure: INGUINAL HERNIA REPAIR LAPAROSCOPIC WITH DAVINCI ROBOT;  Surgeon: Phong Lazo MD;  Location: [facility] OR;  Service: Robotics - DaVinci;  Laterality: Left;   • INSERTION HEMODIALYSIS CATHETER N/A 3/11/2025    Procedure: HEMODIALYSIS CATHETER INSERTION;  Surgeon: Jeaneth Bonds MD;  Location: [facility] OR;  Service: Vascular;  Laterality: N/A;   • QUADRICEPS TENDON REPAIR Left 5/14/2019    Procedure: Left QUADRICEPS TENDON REPAIR;  Surgeon: Camilo Hunter MD;  Location: [facility] OR;  Service: Orthopedics   • THORACENTESIS Right 11/21/2016   • THORACOSCOPY Right 5/8/2017    Procedure: BRONCHOSCOPY, RIGHT VAT,  TOTAL DECORTICATION RIGHT LUNG, PLEURAL BX, PLACEMENT SUBPLEURAL PAIN CAATHETERS X2;  Surgeon: Donald Orlando III, MD;  Location: [facility] OR;  Service:    • TONSILECTOMY, ADENOIDECTOMY, BILATERAL MYRINGOTOMY AND TUBES     • WOUND DEBRIDEMENT Right 3/13/2025    Procedure: DEBRIDEMENT FOOT, RIGHT;  Surgeon: Jimenez Mchugh DPM;  Location: [facility] OR;  Service: Podiatry;  Laterality: Right;       Family History: family history includes Hypertension in his father. Otherwise pertinent FHx was reviewed and not pertinent to current issue.    Social History:  reports that he has never smoked. He has never been exposed to tobacco  smoke. He has never used smokeless tobacco. He reports that he does not currently use alcohol after a past usage of about 7.0 standard drinks of alcohol per week. He reports that he does not use drugs.    Home Medications:   Zinc, acetaminophen, amiodarone, apixaban, atorvastatin, bisacodyl, bumetanide, cholecalciferol, clopidogrel, diphenhydrAMINE, glipizide, glucose blood, hydrALAZINE, insulin lispro, lidocaine, linagliptin, melatonin, metoprolol succinate XL, nitroglycerin, ondansetron ODT, polyethylene glycol, senna, sevelamer, terazosin, traZODone, and vitamin C    Allergies:  Allergies   Allergen Reactions   • Ceclor [Cefaclor] Rash     Rash in his 50s; he tolerated piperacillin-tazobactam in March 2020       Objective    Objective     Vitals:  Temp:  [97 °F (36.1 °C)-98.6 °F (37 °C)] 98 °F (36.7 °C)  Heart Rate:  [64-75] 69  Resp:  [15-20] 16  BP: (112-150)/(40-66) 150/51  Flow (L/min) (Oxygen Therapy):  [2] 2    Physical Exam  Constitutional:       Appearance: He is ill-appearing.   HENT:      Right Ear: External ear normal.      Left Ear: External ear normal.      Mouth/Throat:      Pharynx: Oropharynx is clear.   Eyes:      Conjunctiva/sclera: Conjunctivae normal.   Cardiovascular:      Rate and Rhythm: Normal rate.   Pulmonary:      Breath sounds: Rhonchi present.   Abdominal:      Palpations: Abdomen is soft.   Neurological:      Mental Status: He is alert.         Result Review    Result Review:  I have personally reviewed the results from the time of this admission to 5/22/2025 07:41 EDT and agree with these findings:  []  Laboratory list / accordion  []  Microbiology  []  Radiology  []  EKG/Telemetry   []  Cardiology/Vascular   []  Pathology  []  Old records  []  Other:  Most notable findings include:       Assessment & Plan   Assessment / Plan     Brief Patient Summary:  Rock Maciel Jr. is a 79 y.o. male who   Anemia of chronic disease  Heme Positive stools  possible secondary to angioectasia of  the GI tract      Active Hospital Problems:  Active Hospital Problems    Diagnosis    • **Anemia    • GI bleed    • Peritoneal dialysis status    • Hypoalbuminemia    • Chronic heart failure with preserved ejection fraction (HFpEF)    • Unsteady gait when walking    • Nocturnal hypoxemia    • CKD (chronic kidney disease) stage 4, GFR 15-29 ml/min    • Persistent atrial fibrillation    • Atrial fibrillation, persistent    • Amputated toe of left foot    • S/P CABG x 2    • Amputated toe of right foot    • H/O aortic valve replacement with porcine valve    • Type 2 diabetes mellitus with circulatory disorder, without long-term current use of insulin    • SHASTA (obstructive sleep apnea)    • Essential hypertension      Plan: discussed with patient formal evaluation to include upper and lower tract endoscopy. Given his foot infection, minimal ability to ambulate, He would have a very difficulty time prepping for a colonoscopy and choose to defer that exam at this time.  May consider an upper endoscopy in a few days.  I would consider after 3-4 days to let the plavix drift out of his system       Darrian Webb MD

## 2025-05-22 NOTE — CONSULTS
Chp met with Pt to go over an Advance Directive. RN confirmed Pt decisional. Pt informed p that he has an AD and does not want to update it at this time. p asked Pt to bring a copy of AD in to be scanned if possible. Pt expressed no spiritual care needs at this time and Chp remains available as needed.

## 2025-05-22 NOTE — PROGRESS NOTES
Name: Rock Maciel Jr. ADMIT: 2025   : 1945  PCP: Denise Dickson APRN    MRN: [id number] LOS: 1 days   AGE/SEX: 79 y.o. male  ROOM: [facility]     Subjective   Subjective   Patient is lying in the bed and does not appear to be in major distress.  Denies nausea, vomiting abdominal pain, chest pain.       Objective   Objective   Vital Signs  Temp:  [97.5 °F (36.4 °C)-98.6 °F (37 °C)] 97.8 °F (36.6 °C)  Heart Rate:  [64-75] 67  Resp:  [15-20] 16  BP: (112-150)/(40-66) 129/53  SpO2:  [92 %-100 %] 96 %  on  Flow (L/min) (Oxygen Therapy):  [2] 2;   Device (Oxygen Therapy): nasal cannula  Body mass index is 31.86 kg/m².  Physical Exam   HEENT: PERRLA, extract movements intact, Scleras no icterus  Neck: Supple, no JVD  Cardiovascular: Regular rate and rhythm with normal S1 and S2  Respiratory: Fairly clear to auscultation anteriorly with no wheezes  GI: Soft, nontender, bowel sounds are present  Extremities: No edema, palpable pedal pulses  Neurologic: Grossly nonfocal, no facial asymmetry      Results Review     I reviewed the patient's new clinical results.  Results from last 7 days   Lab Units 25  0535 25  2157 25  1303   WBC 10*3/mm3 5.56  --  5.94   HEMOGLOBIN g/dL 6.6* 7.1* 6.6*   PLATELETS 10*3/mm3 223  --  294     Results from last 7 days   Lab Units 25  0535 25  1303   SODIUM mmol/L 135* 134*   POTASSIUM mmol/L 3.5 3.9   CHLORIDE mmol/L 96* 94*   CO2 mmol/L 30.0* 32.0*   BUN mg/dL 30* 27*   CREATININE mg/dL 3.60* 2.89*   GLUCOSE mg/dL 50* 110*   EGFR mL/min/1.73 16.4* 21.3*     Results from last 7 days   Lab Units 25  0535 25  1303   ALBUMIN g/dL 2.6* 2.9*   BILIRUBIN mg/dL 0.4 0.3   ALK PHOS U/L 77 92   AST (SGOT) U/L 13 12   ALT (SGPT) U/L 9 9     Results from last 7 days   Lab Units 25  0535 25  1303   CALCIUM mg/dL 8.2* 8.5*   ALBUMIN g/dL 2.6* 2.9*   MAGNESIUM mg/dL 2.0  --        Glucose   Date/Time Value Ref Range Status   2025  1103 169 (H) 70 - 130 mg/dL Final   05/22/2025 0701 88 70 - 130 mg/dL Final   05/22/2025 0624 60 (L) 70 - 130 mg/dL Final   05/21/2025 2113 98 70 - 130 mg/dL Final   05/21/2025 1809 103 70 - 130 mg/dL Final       No radiology results for the last day    I have personally reviewed all medications:  Scheduled Medications  amiodarone, 200 mg, Oral, Daily  [Held by provider] apixaban, 2.5 mg, Oral, BID  vitamin C, 250 mg, Oral, Daily  atorvastatin, 20 mg, Oral, Nightly  bumetanide, 2 mg, Oral, TID  cetaphil, , Topical, Q12H  clobetasol propionate, 1 Application, Topical, Q12H  [Held by provider] clopidogrel, 75 mg, Oral, Daily  [START ON 5/23/2025] epoetin vince/vince-epbx, 10,000 Units, Subcutaneous, Once per day on Monday Wednesday Friday  glipizide, 2.5 mg, Oral, BID AC  hydrALAZINE, 10 mg, Oral, TID  insulin lispro, 2-7 Units, Subcutaneous, 4x Daily AC & at Bedtime  linagliptin, 5 mg, Oral, Daily  Menthol-Zinc Oxide, 1 Application, Topical, BID  metoprolol succinate XL, 25 mg, Oral, Daily  miconazole, 1 Application, Topical, Q12H  pantoprazole, 40 mg, Intravenous, BID AC  sevelamer, 800 mg, Oral, TID With Meals  sodium chloride, 10 mL, Intravenous, Q12H  sodium chloride, 10 mL, Intravenous, Q12H  sodium chloride, 10 mL, Intravenous, Q12H  sodium hypochlorite, , Topical, BID  terazosin, 2 mg, Oral, Nightly    Infusions   Diet  Diet: Cardiac; Healthy Heart (2-3 Na+); Fluid Consistency: Thin (IDDSI 0)  NPO Diet NPO Type: Sips with Meds    I have personally reviewed:  [x]  Laboratory   [x]  Microbiology   [x]  Radiology   [x]  EKG/Telemetry  [x]  Cardiology/Vascular   []  Pathology    []  Records       Assessment/Plan     Active Hospital Problems    Diagnosis  POA    **Anemia [D64.9]  Yes    GI bleed [K92.2]  Yes    Peritoneal dialysis status [Z99.2]  Not Applicable    Hypoalbuminemia [E88.09]  Yes    Chronic heart failure with preserved ejection fraction (HFpEF) [I50.32]  Yes    Unsteady gait when walking [R26.81]  Yes     Nocturnal hypoxemia [G47.34]  Yes    CKD (chronic kidney disease) stage 4, GFR 15-29 ml/min [N18.4]  Yes    Persistent atrial fibrillation [I48.19]  Yes    Atrial fibrillation, persistent [I48.19]  Yes    Amputated toe of left foot [S98.132A]  Yes    S/P CABG x 2 [Z95.1]  Not Applicable    Amputated toe of right foot [S98.131A]  Yes    H/O aortic valve replacement with porcine valve [Z95.3]  Not Applicable    Type 2 diabetes mellitus with circulatory disorder, without long-term current use of insulin [E11.59]  Yes    SHASTA (obstructive sleep apnea) [G47.33]  Yes    Essential hypertension [I10]  Yes      Resolved Hospital Problems   No resolved problems to display.       79 y.o. male admitted with Anemia.    1. Anemia/hematuria, urology reevaluated and will undergo CBI.  Plan is for cystoscopy tomorrow.Received 1 unit PRBC on 5/21 and hemoglobin improved from 6.6-7.1 and again noted to be 6.6 today.  Receiving second unit today.  Gastroenterology following along.  2.  ESRD, nephrology did evaluate and will continue with hemodialysis Tuesday Thursday and Saturday.  3.  Chronic atrial fibrillation, Plavix and Eliquis is on hold and will continue with amiodarone.  Continue with metoprolol as well.  4.  Chronic diastolic heart failure, on metoprolol and Bumex which will be continued.  5.  CAD/CABG/history of aortic valve replacement with porcine valve  6.  Right thigh contact rash  7.  Hypertension, on hydralazine, metoprolol.  8.  Diabetes mellitus, on corrective dose insulin which will be continued for the moment.  9.  History of BPH, on terazosin.  10.  Obesity with BMI of 31.86, counseled to lose weight.        Wesley Christianson MD  [facility]  05/22/25  14:51 EDT

## 2025-05-22 NOTE — CONSULTS
CONSULT NOTE    Infectious Diseases - Nicolasa Flowers MD  [facility]       Patient Identification:  Name: Rock Maciel Jr.  Age: 79 y.o.  Sex: male  :  1945  MRN: [id number]             Date of Consultation: 2025      Primary Care Physician: Denise Dickson APRN                               Requesting Physician: Dr. Parisi  Reason for Consultation: Wound on the foot with history of osteo-.    History of presenting illness: Patient is a 79-year-old male who has complicated past medical history including history of end-stage renal disease on hemodialysis , history of urinary retention, atrial fibrillation, hypertension, history of aortic valve replacement, chronic anemia was hospitalized in March of this year for various issues and was noted to have sepsis due to infected right foot osteomyelitis with abscess.  Patient foot culture was positive for MRSA and patient underwent debridement and resection of infected bone on 3/10/2025.  Throughout hospitalization was received IV antibiotic therapy and based on the pathology findings on the operative sample taken on 3/10/2025 revealing resected bone margin free of acute inflammation and osteomyelitis patient was switched to oral Augmentin and linezolid to complete 2 weeks of treatment from 3/10/2025 and completed on 3/24/2025.  Patient has been in rehab since and does not recall follow-up with the podiatry service.  Patient has been getting local wound care on his right foot.  At this background patient was feeling otherwise fine but with no hemoglobin of 6 at the facility resulting in stent to the emergency room for further evaluation.  Patient does not have any systemic signs and symptoms of fever chills nausea vomiting or other symptoms.  Infectious disease service is consulted to comment on need for further antibiotic therapy.  Impression: 79-year-old male with  1-History infected right diabetic foot wound with osteomyelitis and  abscess status post definitive surgical debridement and resection with documented bone margin free of osteomyelitis on 3/10/2025 with culture positive for MRSA status post adequate treatment for residual soft tissue infection with oral linezolid and Zyvox completed on 3/24/2025.  Patient currently seems to be stable and does not have evidence of active right foot infection or systemic symptoms.  He does have open wound on the right foot that would require care.  2-severe anemia multifactorial management per primary team  3-immobility with evolving decubitus skin changes  4-end-stage renal disease on dialysis per nephrology service via right IJ tunnel catheter  5-immobilization syndrome #6-diabetes  7-peripheral vascular disease  8-other diagnoses per primary team.      Recommendations/Discussions:  As atrial fibrillation and his clinical presentation and reason for hospitalization I do not think dealing with ongoing active infection of the right foot.  Patient does require local wound care and would benefit from podiatry service while he is being treated for symptomatic anemia in terms of workup and transfusion and other comorbidities are managed with the help of cardiology nephrology and urology service.  Periodically assess for any markers of sepsis sepsis fever alternative status worsening erythema and pain of the right foot.  Observe off antibiotic therapy.    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  Added automatically from request for surgery [id number]      Gout     H/O aortic valve replacement with porcine valve 09/10/2018    Hiatal hernia     History of MRSA infection     RIGHT FOOT 2010    Hyperlipidemia     Hypertension     LAFB (left anterior fascicular block) 05/17/2016    MRSA (methicillin resistant Staphylococcus aureus) infection 03/08/2025    Murmur     Neuropathic arthropathy 05/17/2016    Overview:   2015 IMO UPDATE  Overview:   2015 IMO UPDATE      Nocturnal hypoxemia 12/01/2024    Nonrheumatic aortic valve stenosis 01/30/2017    Persistent atrial fibrillation 11/07/2024    Pneumothorax on right     S/P CABG x 2 05/06/2019 July 2018      Sleep apnea     pt wears CPAP at night    Status post left inguinal hernia repair, follow-up exam 01/05/2025    Traumatic rhabdomyolysis 03/03/2025    Type 2 diabetes mellitus with circulatory disorder, without long-term current use of insulin 05/17/2016    Unsteady gait when walking 01/05/2025    Wellness examination 06/24/2015    Annual Wellness Visit     Past Surgical History:  Past Surgical History:   Procedure Laterality Date    ARTERY SURGERY Bilateral     carotid    BONE EXCISION LEG Right 3/10/2025    Procedure: BONE EXCISION LOWER EXTERMITY, RIGHT;  Surgeon: Jimenez Mchugh DPM;  Location: [facility] OR;  Service: Podiatry;  Laterality: Right;    CARDIAC CATHETERIZATION N/A 7/20/2018    Procedure: Left Heart Cath;   Surgeon: Jo Toney MD;  Location: [facility] CATH INVASIVE LOCATION;  Service: Cardiovascular    CARDIAC CATHETERIZATION N/A 7/20/2018    Procedure: Coronary angiography;  Surgeon: Jo Toney MD;  Location: [facility] CATH [facility]VASIVE LOC[facility];  Service: Cardiovascular    CAROTID ENDARTERECTOMY Bilateral     COLONOSCOPY  2008    CORONARY ARTERY BYPASS GRAFT WITH AORTIC VALVE REPAIR/REPLACEMENT N/A 7/23/2018    Procedure: INTRAOPERATIVE ALEX, MIDLINE STERNOTOMY, CORONARY ARTERY BYPASS GRAFTING X 3 USING ENDOSCOPICALLY HARVESTED LEFT GREATER SAPHENOUS VEIN,  AORTIC VALVE REPLACEMENT USING 25MM LOPEZ II ULTRA PORCINE VALVE, PRP;  Surgeon: Phong Posey MD;  Location: [facility] MAIN OR;  Service: Cardiothoracic    FOOT SURGERY Right 2010    5th digit removal    FOOT SURGERY Left 2011    1 digit removed    INCISION AND DRAINAGE LEG Right 3/28/2020    Procedure: DEBRIDMENT OF RIGHT CALF;  Surgeon: Ross Pineda MD;  Location: [facility] MAIN OR;  Service: Vascular;  Laterality: Right;    INCISION AND DRAINAGE LEG Right 3/10/2025    Procedure: INCISION AND DRAINAGE LOWER EXTREMITY;  Surgeon: Jimenez Mchugh DPM;  Location: [facility] MAIN OR;  Service: Podiatry;  Laterality: Right;    INGUINAL HERNIA REPAIR Left 11/29/2024    Procedure: INGUINAL HERNIA REPAIR LAPAROSCOPIC WITH DAVINCI ROBOT;  Surgeon: Phong Lazo MD;  Location: [facility] MAIN OR;  Service: Robotics - DaVinci;  Laterality: Left;    INSERTION HEMODIALYSIS CATHETER N/A 3/11/2025    Procedure: HEMODIALYSIS CATHETER INSERTION;  Surgeon: Jeaneth Bonds MD;  Location: [facility] MAIN OR;  Service: Vascular;  Laterality: N/A;    QUADRICEPS TENDON REPAIR Left 5/14/2019    Procedure: Left QUADRICEPS TENDON REPAIR;  Surgeon: Camilo Hunter MD;  Location: [facility] MAIN OR;  Service: Orthopedics    THORACENTESIS Right 11/21/2016    THORACOSCOPY Right 5/8/2017    Procedure: BRONCHOSCOPY, RIGHT VAT,  TOTAL DECORTICATION RIGHT LUNG, PLEURAL  BX, PLACEMENT SUBPLEURAL PAIN CAATHETERS X2;  Surgeon: Donald Orlando III, MD;  Location[facility] OR;  Service:     TONSILECTOMY, ADENOIDECTOMY, BILATERAL MYRINGOTOMY AND TUBES      WOUND DEBRIDEMENT Right 3/13/2025    Procedure: DEBRIDEMENT FOOT, RIGHT;  Surgeon: Jimenez Mchugh DPM;  Location: [facility] MAIN OR;  Service: Podiatry;  Laterality: Right;      Home Meds:  Medications Prior to Admission   Medication Sig Dispense Refill Last Dose/Taking    amiodarone (PACERONE) 200 MG tablet Take 1 tablet by mouth Daily. 30 tablet 5 5/21/2025    apixaban (ELIQUIS) 2.5 MG tablet tablet Take 1 tablet by mouth 2 (Two) Times a Day. Indications: Atrial Fibrillation 60 tablet 5 5/21/2025    atorvastatin (LIPITOR) 20 MG tablet Take 1 tablet by mouth Every Night. NEED TO SEE PROVIDER FOR FUTURE REFILLS. 30 tablet 0 5/20/2025    bisacodyl (DULCOLAX) 10 MG suppository Insert 1 suppository into the rectum Daily As Needed for Constipation.   Past Month    bumetanide (BUMEX) 2 MG tablet Take 1 tablet by mouth 3 (Three) Times a Day.   5/21/2025    clopidogrel (PLAVIX) 75 MG tablet Take 1 tablet by mouth Daily. 90 tablet 1 5/21/2025    diphenhydrAMINE (BENADRYL) 25 mg capsule Take 1 capsule by mouth 3 (Three) Times a Day As Needed for Itching.   Past Month    glipizide (GLUCOTROL) 5 MG tablet Take 0.5 tablets by mouth 2 (Two) Times a Day Before Meals. Indications: Type 2 Diabetes   5/21/2025    glucose blood (Accu-Chek Keshia Plus) test strip USE TO TEST BLOOD SUGAR    TWICE DAILY FOR DIABETES 100 each 8 5/21/2025    hydrALAZINE (APRESOLINE) 10 MG tablet Take 1 tablet by mouth 3 (Three) Times a Day.   5/21/2025    insulin lispro (HUMALOG/ADMELOG) 100 UNIT/ML injection Inject 2-9 Units under the skin into the appropriate area as directed 4 (Four) Times a Day Before Meals & at Bedtime.   5/21/2025    lidocaine (LIDODERM) 5 % Place 1 patch on the skin as directed by provider Daily. Remove & Discard patch within 12 hours or as  directed by MD  apply to left shoulder   5/21/2025    linagliptin (Tradjenta) 5 MG tablet tablet Take 1 tablet by mouth Daily. 90 tablet 1 5/21/2025    melatonin 5 MG tablet tablet Take 1 tablet by mouth Every Night.   5/20/2025    metoprolol succinate XL (TOPROL-XL) 25 MG 24 hr tablet Take 1 tablet by mouth Daily. 30 tablet 5 5/21/2025    nitroglycerin (NITROSTAT) 0.4 MG SL tablet Place 1 tablet under the tongue Every 5 (Five) Minutes As Needed for Chest Pain (Only if SBP Greater Than 100). Take no more than 3 doses in 15 minutes. 25 tablet 0 Past Month    ondansetron ODT (ZOFRAN-ODT) 4 MG disintegrating tablet Take 1 tablet by mouth Every 6 (Six) Hours As Needed for Nausea or Vomiting.   Past Month    polyethylene glycol (MIRALAX) 17 g packet Take 17 g by mouth Daily.   Past Month    senna 8.6 MG tablet Take 1 tablet by mouth Daily.   5/21/2025    sevelamer (RENVELA) 800 MG tablet Take 1 tablet by mouth 3 (Three) Times a Day With Meals. (Patient taking differently: Take 1 tablet by mouth Daily With Lunch.)   5/21/2025    terazosin (HYTRIN) 2 MG capsule Take 1 capsule by mouth Every Night. 90 capsule 1 5/20/2025    traZODone (DESYREL) 50 MG tablet Take 1.5 tablets by mouth Every Night.   Past Week Bedtime    vitamin C (ASCORBIC ACID) 250 MG tablet Take 1 tablet by mouth Daily.   5/21/2025    Zinc 50 MG tablet    5/21/2025    acetaminophen (TYLENOL) 325 MG tablet Take 2 tablets by mouth Every 6 (Six) Hours As Needed for Mild Pain.   5/13/2025    Cholecalciferol 25 MCG (1000 UT) tablet Take 1 tablet by mouth Every 7 (Seven) Days.   5/20/2025     Current Meds:     Current Facility-Administered Medications:     acetaminophen (TYLENOL) tablet 650 mg, 650 mg, Oral, Q4H PRN, 650 mg at 05/22/25 0019 **OR** acetaminophen (TYLENOL) 160 MG/5ML oral solution 650 mg, 650 mg, Oral, Q4H PRN **OR** acetaminophen (TYLENOL) suppository 650 mg, 650 mg, Rectal, Q4H PRN, Maria Isabel Parisi MD    amiodarone (PACERONE) tablet 200  mg, 200 mg, Oral, Daily, Maria Isabel Parisi MD    [Held by provider] apixaban (ELIQUIS) tablet 2.5 mg, 2.5 mg, Oral, BID, Maria Isabel Parisi MD    ascorbic acid (VITAMIN C) tablet 250 mg, 250 mg, Oral, Daily, Maria Isabel Parisi MD    atorvastatin (LIPITOR) tablet 20 mg, 20 mg, Oral, Nightly, Maria Isabel Parisi MD, 20 mg at 05/22/25 0133    sennosides-docusate (PERICOLACE) 8.6-50 MG per tablet 2 tablet, 2 tablet, Oral, BID PRN **AND** polyethylene glycol (MIRALAX) packet 17 g, 17 g, Oral, Daily PRN **AND** bisacodyl (DULCOLAX) EC tablet 5 mg, 5 mg, Oral, Daily PRN **AND** bisacodyl (DULCOLAX) suppository 10 mg, 10 mg, Rectal, Daily PRN, Maria Isabel Parisi MD    bumetanide (BUMEX) tablet 2 mg, 2 mg, Oral, TID, Maria Isabel Parisi MD    calcium carbonate (TUMS) chewable tablet 500 mg (200 mg elemental), 2 tablet, Oral, BID PRN, Maria Isabel Parisi MD    cetaphil lotion, , Topical, Q12H, Maria Isabel Parisi MD    clobetasol propionate (TEMOVATE) 0.05 % cream 1 Application, 1 Application, Topical, Q12H, Maria Isabel Parisi MD    [Held by provider] clopidogrel (PLAVIX) tablet 75 mg, 75 mg, Oral, Daily, Maria Isabel Parisi MD    dextrose (D50W) (25 g/50 mL) IV injection 25 g, 25 g, Intravenous, Q15 Min PRN, Maria Isabel Parisi MD    dextrose (GLUTOSE) oral gel 15 g, 15 g, Oral, Q15 Min PRN, Maria Isabel Parisi MD    glipizide (GLUCOTROL) tablet 2.5 mg, 2.5 mg, Oral, BID AC, Maria Isabel Parisi MD    glucagon (GLUCAGEN) injection 1 mg, 1 mg, Intramuscular, Q15 Min PRN, Maria Isabel Parisi MD    hydrALAZINE (APRESOLINE) tablet 10 mg, 10 mg, Oral, TID, Maria Isabel Parisi MD    insulin lispro (HUMALOG/ADMELOG) injection 2-7 Units, 2-7 Units, Subcutaneous, 4x Daily AC & at Bedtime, Maria Isabel Parisi MD    linagliptin (TRADJENTA) tablet 5 mg, 5 mg, Oral, Daily, Maria Isabel Parisi MD    melatonin tablet 5 mg, 5 mg, Oral, Nightly PRN, Maria Isabel Parisi  "MD Yesenia, 5 mg at 05/22/25 0019    metoprolol succinate XL (TOPROL-XL) 24 hr tablet 25 mg, 25 mg, Oral, Daily, Maria Isabel Parisi MD    nitroglycerin (NITROSTAT) SL tablet 0.4 mg, 0.4 mg, Sublingual, Q5 Min PRN, Maria Isabel Parisi MD    ondansetron ODT (ZOFRAN-ODT) disintegrating tablet 4 mg, 4 mg, Oral, Q6H PRN **OR** ondansetron (ZOFRAN) injection 4 mg, 4 mg, Intravenous, Q6H PRN, Maria Isabel Parisi MD    pantoprazole (PROTONIX) injection 40 mg, 40 mg, Intravenous, BID AC, Maria Isabel Parisi MD    sevelamer (RENVELA) tablet 800 mg, 800 mg, Oral, TID With Meals, Maria Isabel Parisi MD    sodium chloride 0.9 % flush 10 mL, 10 mL, Intravenous, PRN, Haja Lo MD    sodium chloride 0.9 % flush 10 mL, 10 mL, Intravenous, Q12H, Maria Isabel Parisi MD, 10 mL at 05/21/25 2255    sodium chloride 0.9 % flush 10 mL, 10 mL, Intravenous, PRN, Maria Isabel Parisi MD    sodium chloride 0.9 % infusion 40 mL, 40 mL, Intravenous, PRN, Maria Isabel Parisi MD    terazosin (HYTRIN) capsule 2 mg, 2 mg, Oral, Nightly, Maria Isabel Parisi MD, 2 mg at 05/22/25 0145  Allergies:  Allergies   Allergen Reactions    Ceclor [Cefaclor] Rash     Rash in his 50s; he tolerated piperacillin-tazobactam in March 2020     Social History:   Social History     Tobacco Use    Smoking status: Never     Passive exposure: Never    Smokeless tobacco: Never   Substance Use Topics    Alcohol use: Not Currently     Alcohol/week: 7.0 standard drinks of alcohol     Types: 7 Drinks containing 0.5 oz of alcohol per week      Family History:  Family History   Problem Relation Age of Onset    Hypertension Father     Malig Hyperthermia Neg Hx           Review of Systems  See history of present illness and past medical history.   Denies any fever or chills.      Vitals:   /51 (BP Location: Left arm, Patient Position: Lying)   Pulse 69   Temp 98 °F (36.7 °C) (Oral)   Resp 16   Ht 185.4 cm (72.99\")   Wt 110 kg " (241 lb 6.5 oz)   SpO2 98%   BMI 31.86 kg/m²   I/O:   Intake/Output Summary (Last 24 hours) at 5/22/2025 0741  Last data filed at 5/21/2025 1849  Gross per 24 hour   Intake 590 ml   Output --   Net 590 ml     Exam:  Patient is examined using the personal protective equipment as per guidelines from infection control for this particular patient as enacted.  Hand washing was performed before and after patient interaction.  General Appearance:  Alert cooperative chronically ill nontoxic-appearing male who does not appear to be in any acute distress.   Head:    Normocephalic, without obvious abnormality, atraumatic   Eyes:  Pale conjunctiva   Ears:    Normal external ear canals, both ears   Nose:   Nares normal, septum midline, mucosa normal, no drainage    or sinus tenderness   Throat:   Lips, tongue, gums normal; oral mucosa pink and moist   Neck:   Supple   Back:     Symmetric, no curvature, ROM normal, no CVA tenderness   Lungs:     Clear to auscultation bilaterally, respirations unlabored   Chest Wall:    No tenderness or deformity, right IJ tunneled catheter in place    Heart:  S1-S2 irregular   Abdomen:     Soft, non-tender, bowel sounds active all four quadrants,     no masses, no hepatomegaly, no splenomegaly   Extremities:          Skin:                          Neurologic: Alert and oriented x 3       Data Review:    I reviewed the patient's new clinical results.  Results from last 7 days   Lab Units 05/22/25  0535 05/21/25  2157 05/21/25  1303   WBC 10*3/mm3 5.56  --  5.94   HEMOGLOBIN g/dL 6.6* 7.1* 6.6*   PLATELETS 10*3/mm3 223  --  294     Results from last 7 days   Lab Units 05/22/25  0535 05/21/25  1303   SODIUM mmol/L 135* 134*   POTASSIUM mmol/L 3.5 3.9   CHLORIDE mmol/L 96* 94*   CO2 mmol/L 30.0* 32.0*   BUN mg/dL 30* 27*   CREATININE mg/dL 3.60* 2.89*   CALCIUM mg/dL 8.2* 8.5*   GLUCOSE mg/dL 50* 110*   XR Shoulder 2+ View Left  Result Date: 5/16/2025  No new fractures are identified. Technically  limited exam.  This report was finalized on 5/16/2025 1:53 AM by Dr. Inocencia Cruz M.D on Workstation: [id number]      Microbiology Results (last 10 days)       ** No results found for the last 240 hours. **              Assessment:  Active Hospital Problems    Diagnosis  POA    **Anemia [D64.9]  Yes    GI bleed [K92.2]  Yes    Peritoneal dialysis status [Z99.2]  Not Applicable    Hypoalbuminemia [E88.09]  Yes    Chronic heart failure with preserved ejection fraction (HFpEF) [I50.32]  Yes    Unsteady gait when walking [R26.81]  Yes    Nocturnal hypoxemia [G47.34]  Yes    CKD (chronic kidney disease) stage 4, GFR 15-29 ml/min [N18.4]  Yes    Persistent atrial fibrillation [I48.19]  Yes    Atrial fibrillation, persistent [I48.19]  Yes    Amputated toe of left foot [S98.132A]  Yes    S/P CABG x 2 [Z95.1]  Not Applicable    Amputated toe of right foot [S98.131A]  Yes    H/O aortic valve replacement with porcine valve [Z95.3]  Not Applicable    Type 2 diabetes mellitus with circulatory disorder, without long-term current use of insulin [E11.59]  Yes    SHASTA (obstructive sleep apnea) [G47.33]  Yes    Essential hypertension [I10]  Yes      Resolved Hospital Problems   No resolved problems to display.         Plan:  See above  Nicolasa Denny MD   5/22/2025  07:41 EDT    Parts of this note may be an electronic transcription/translation of spoken language to printed text using the Dragon dictation system.

## 2025-05-22 NOTE — CONSULTS
Patient Name: Rock Maciel Jr.  : 1945  MRN: [id number]  Primary Care Physician: Denise Dickson APRN  Date of admission: 2025    Patient Care Team:  Denise Dickson APRN as PCP - General (Nurse Practitioner)  Azam Banegas Jr., MD as Consulting Physician (Cardiology)  Jamil Stevens MD as Consulting Physician (Nephrology)        Reason for Consult:       KILLIAN/CKD-->HD 5 days/week    Subjective   History of Present Illness:   Chief Complaint:   Chief Complaint   Patient presents with   • Abnormal Lab     HISTORY:  Rock Maciel Jr. is a 79 y.o. male with past medical history of CKD with hospitalization in 2025 requiring HD due to KILLIAN likely ATN 2/2 rhabdomyolysis, prerenal insult related to diuretics now on next stage HD 5 days per week (MTWThF) with RIJ TDC, recent osteomyelitis and abscess right foot, T2DM, HTN, A-fib, HFpEF, CAD, PVD and other medical comorbid conditions.    Patient sent to ED by living facility with low hgb of 6.6, also with reports of urinary retention and hematuria. He was found to have >999mL on bladder scan, home AC held, Urology following. He is receiving 1 units pRBCs. Labs significant for sodium 135, CO2 30, sCr 3.60, BUN 30, glucose 50, TSH 4.4, hgb 6.6. Nephrology consulted for HD management.      Review of systems:    Constitutional:  weakness.  HEENT:  No headache, otalgia, itchy eyes, nasal discharge or sore throat.  Cardiac:  No chest pain, dyspnea, orthopnea or PND.  Chest:              No cough, phlegm or wheezing.  Abdomen:  abdominal pain  Neuro:  No focal weakness, abnormal movements or seizure-like activity.  :   Decreased UOP, hematuria  ROS was otherwise negative except as mentioned in the [place].       Personal History:     Past Medical History:   Past Medical History:   Diagnosis Date   • Acquired absence of left foot 2022   • Acute osteomyelitis of right foot 2025   • Allergic rhinitis    • Amputated toe of left foot  02/12/2021   • Amputated toe of right foot 04/11/2019   • ATN (acute tubular necrosis) 03/05/2025   • Atrial fibrillation, persistent 10/18/2024   • Bladder wall thickening 12/01/2024   • Bronchitis    • Carotid stenosis, asymptomatic 01/30/2017   • 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   • Chronic heart failure with preserved ejection fraction (HFpEF) 03/19/2025   • CKD (chronic kidney disease) stage 4, GFR 15-29 ml/min 12/01/2024   • Constipation 05/11/2019   • Coronary artery disease    • Diabetes mellitus 2001   • Diabetic foot infection 03/07/2025   • DM (diabetes mellitus)    • Encounter for annual health examination 05/06/2014    Annual Health Assessment   • Gas gangrene 10/25/2021    Formatting of this note might be different from the original.  Added automatically from request for surgery [id number]     • Gout    • H/O aortic valve replacement with porcine valve 09/10/2018   • Hiatal hernia    • History of MRSA infection     RIGHT FOOT 2010   • Hyperlipidemia    • Hypertension    • LAFB (left anterior fascicular block) 05/17/2016   • MRSA (methicillin resistant Staphylococcus aureus) infection 03/08/2025   • Murmur    • Neuropathic arthropathy 05/17/2016    Overview:   2015 IMO UPDATE  Overview:   2015 IMO UPDATE     • Nocturnal hypoxemia 12/01/2024   • Nonrheumatic aortic valve stenosis 01/30/2017   • Persistent atrial fibrillation 11/07/2024   • Pneumothorax on right    • S/P CABG x 2 05/06/2019 July 2018     • Sleep apnea     pt wears CPAP at night   • Status post left inguinal hernia repair, follow-up exam 01/05/2025   • Traumatic rhabdomyolysis 03/03/2025   • Type 2 diabetes mellitus with circulatory disorder, without long-term current use of insulin 05/17/2016   • Unsteady gait when walking 01/05/2025   • Wellness examination 06/24/2015    Annual Wellness Visit       Surgical History:      Past Surgical History:   Procedure Laterality Date   • ARTERY SURGERY Bilateral      carotid   • BONE EXCISION LEG Right 3/10/2025    Procedure: BONE EXCISION LOWER EXTERMITY, RIGHT;  Surgeon: Jimenez Mchugh DPM;  Location: [facility] MAIN OR;  Service: Podiatry;  Laterality: Right;   • CARDIAC CATHETERIZATION N/A 7/20/2018    Procedure: Left Heart Cath;  Surgeon: Jo Toney MD;  Location: [facility] CATH INVASIVE LOCATION;  Service: Cardiovascular   • CARDIAC CATHETERIZATION N/A 7/20/2018    Procedure: Coronary angiography;  Surgeon: Jo Toney MD;  Location: [facility] CATH INVASIVE LOCATION;  Service: Cardiovascular   • CAROTID ENDARTERECTOMY Bilateral    • COLONOSCOPY  2008   • CORONARY ARTERY BYPASS GRAFT WITH AORTIC VALVE REPAIR/REPLACEMENT N/A 7/23/2018    Procedure: INTRAOPERATIVE ALEX, MIDLINE STERNOTOMY, CORONARY ARTERY BYPASS GRAFTING X 3 USING ENDOSCOPICALLY HARVESTED LEFT GREATER SAPHENOUS VEIN,  AORTIC VALVE REPLACEMENT USING 25MM LOPEZ II ULTRA PORCINE VALVE, PRP;  Surgeon: Phong Posey MD;  Location: [facility] MAIN OR;  Service: Cardiothoracic   • FOOT SURGERY Right 2010    5th digit removal   • FOOT SURGERY Left 2011    1 digit removed   • INCISION AND DRAINAGE LEG Right 3/28/2020    Procedure: DEBRIDMENT OF RIGHT CALF;  Surgeon: Ross Pineda MD;  Location: [facility] MAIN OR;  Service: Vascular;  Laterality: Right;   • INCISION AND DRAINAGE LEG Right 3/10/2025    Procedure: INCISION AND DRAINAGE LOWER EXTREMITY;  Surgeon: Jimenez Mchugh DPM;  Location: [facility] MAIN OR;  Service: Podiatry;  Laterality: Right;   • INGUINAL HERNIA REPAIR Left 11/29/2024    Procedure: INGUINAL HERNIA REPAIR LAPAROSCOPIC WITH DAVINCI ROBOT;  Surgeon: Phong Lazo MD;  Location: [facility] MAIN OR;  Service: Robotics - DaVinci;  Laterality: Left;   • INSERTION HEMODIALYSIS CATHETER N/A 3/11/2025    Procedure: HEMODIALYSIS CATHETER INSERTION;  Surgeon: Jeaneth Bonds MD;  Location: [facility] MAIN OR;  Service: Vascular;  Laterality: N/A;   • QUADRICEPS TENDON REPAIR  Left 5/14/2019    Procedure: Left QUADRICEPS TENDON REPAIR;  Surgeon: Camilo Hunter MD;  Location: [facility] OR;  Service: Orthopedics   • THORACENTESIS Right 11/21/2016   • THORACOSCOPY Right 5/8/2017    Procedure: BRONCHOSCOPY, RIGHT VAT,  TOTAL DECORTICATION RIGHT LUNG, PLEURAL BX, PLACEMENT SUBPLEURAL PAIN CAATHETERS X2;  Surgeon: Donald Orlando III, MD;  Location: [facility] OR;  Service:    • TONSILECTOMY, ADENOIDECTOMY, BILATERAL MYRINGOTOMY AND TUBES     • WOUND DEBRIDEMENT Right 3/13/2025    Procedure: DEBRIDEMENT FOOT, RIGHT;  Surgeon: Jimenez Mchugh DPM;  Location: [facility] OR;  Service: Podiatry;  Laterality: Right;       Family History: family history includes Hypertension in his father. Otherwise pertinent FHx was reviewed and unremarkable.     Social History:  reports that he has never smoked. He has never been exposed to tobacco smoke. He has never used smokeless tobacco. He reports that he does not currently use alcohol after a past usage of about 7.0 standard drinks of alcohol per week. He reports that he does not use drugs.    Medications:  Prior to Admission medications    Medication Sig Start Date End Date Taking? Authorizing Provider   amiodarone (PACERONE) 200 MG tablet Take 1 tablet by mouth Daily. 1/9/25  Yes Jo Toney MD   apixaban (ELIQUIS) 2.5 MG tablet tablet Take 1 tablet by mouth 2 (Two) Times a Day. Indications: Atrial Fibrillation 11/15/24  Yes Apurva Smith APRN   atorvastatin (LIPITOR) 20 MG tablet Take 1 tablet by mouth Every Night. NEED TO SEE PROVIDER FOR FUTURE REFILLS. 2/7/25  Yes Denise Dickson APRN   bisacodyl (DULCOLAX) 10 MG suppository Insert 1 suppository into the rectum Daily As Needed for Constipation.   Yes Provider, MD Shivam   bumetanide (BUMEX) 2 MG tablet Take 1 tablet by mouth 3 (Three) Times a Day. 3/19/25  Yes Magdalena Diamond MD   clopidogrel (PLAVIX) 75 MG tablet Take 1 tablet by mouth Daily. 10/16/24  Yes  Denise Dickson APRN   diphenhydrAMINE (BENADRYL) 25 mg capsule Take 1 capsule by mouth 3 (Three) Times a Day As Needed for Itching.   Yes ProviderShivam MD   glipizide (GLUCOTROL) 5 MG tablet Take 0.5 tablets by mouth 2 (Two) Times a Day Before Meals. Indications: Type 2 Diabetes 3/19/25  Yes Magdalena Diamond MD   glucose blood (Accu-Chek Keshia Plus) test strip USE TO TEST BLOOD SUGAR    TWICE DAILY FOR DIABETES 7/29/24  Yes Denise Dickson APRN   hydrALAZINE (APRESOLINE) 10 MG tablet Take 1 tablet by mouth 3 (Three) Times a Day. 3/19/25  Yes Magdalena Diamond MD   insulin lispro (HUMALOG/ADMELOG) 100 UNIT/ML injection Inject 2-9 Units under the skin into the appropriate area as directed 4 (Four) Times a Day Before Meals & at Bedtime. 3/19/25  Yes Magdalena Diamond MD   lidocaine (LIDODERM) 5 % Place 1 patch on the skin as directed by provider Daily. Remove & Discard patch within 12 hours or as directed by MD  apply to left shoulder   Yes ProviderShivam MD   linagliptin (Tradjenta) 5 MG tablet tablet Take 1 tablet by mouth Daily. 1/9/25  Yes Denise Dickson APRN   melatonin 5 MG tablet tablet Take 1 tablet by mouth Every Night. 3/19/25  Yes Magdalena Diamond MD   metoprolol succinate XL (TOPROL-XL) 25 MG 24 hr tablet Take 1 tablet by mouth Daily. 11/15/24  Yes Apurva Smith APRN   nitroglycerin (NITROSTAT) 0.4 MG SL tablet Place 1 tablet under the tongue Every 5 (Five) Minutes As Needed for Chest Pain (Only if SBP Greater Than 100). Take no more than 3 doses in 15 minutes. 11/15/24  Yes Apurva Smith APRN   ondansetron ODT (ZOFRAN-ODT) 4 MG disintegrating tablet Take 1 tablet by mouth Every 6 (Six) Hours As Needed for Nausea or Vomiting. 3/19/25  Yes Magdalena Diamond MD   polyethylene glycol (MIRALAX) 17 g packet Take 17 g by mouth Daily.   Yes Provider, Historical, MD   senna 8.6 MG tablet Take 1 tablet by mouth Daily.   Yes Provider, Historical, MD   sevelamer  (RENVELA) 800 MG tablet Take 1 tablet by mouth 3 (Three) Times a Day With Meals.  Patient taking differently: Take 1 tablet by mouth Daily With Lunch. 3/19/25  Yes Magdalena Diamond MD   terazosin (HYTRIN) 2 MG capsule Take 1 capsule by mouth Every Night. 12/27/24  Yes Denise Dickson APRN   traZODone (DESYREL) 50 MG tablet Take 1.5 tablets by mouth Every Night.   Yes Shivam Smith MD   vitamin C (ASCORBIC ACID) 250 MG tablet Take 1 tablet by mouth Daily.   Yes Shivam Smith MD   Zinc 50 MG tablet  2/19/22  Yes Shivam Smith MD   acetaminophen (TYLENOL) 325 MG tablet Take 2 tablets by mouth Every 6 (Six) Hours As Needed for Mild Pain. 11/30/24   Bishop Chakraborty MD   Cholecalciferol 25 MCG (1000 UT) tablet Take 1 tablet by mouth Every 7 (Seven) Days.    ProviderShivam MD     Scheduled Meds:amiodarone, 200 mg, Oral, Daily  [Held by provider] apixaban, 2.5 mg, Oral, BID  vitamin C, 250 mg, Oral, Daily  atorvastatin, 20 mg, Oral, Nightly  bumetanide, 2 mg, Oral, TID  cetaphil, , Topical, Q12H  clobetasol propionate, 1 Application, Topical, Q12H  [Held by provider] clopidogrel, 75 mg, Oral, Daily  glipizide, 2.5 mg, Oral, BID AC  hydrALAZINE, 10 mg, Oral, TID  insulin lispro, 2-7 Units, Subcutaneous, 4x Daily AC & at Bedtime  linagliptin, 5 mg, Oral, Daily  Menthol-Zinc Oxide, 1 Application, Topical, BID  metoprolol succinate XL, 25 mg, Oral, Daily  miconazole, 1 Application, Topical, Q12H  pantoprazole, 40 mg, Intravenous, BID AC  sevelamer, 800 mg, Oral, TID With Meals  sodium chloride, 10 mL, Intravenous, Q12H  sodium chloride, 10 mL, Intravenous, Q12H  sodium chloride, 10 mL, Intravenous, Q12H  sodium hypochlorite, , Topical, BID  terazosin, 2 mg, Oral, Nightly      Continuous Infusions:   PRN Meds:•  acetaminophen **OR** acetaminophen **OR** acetaminophen  •  senna-docusate sodium **AND** polyethylene glycol **AND** bisacodyl **AND** bisacodyl  •  calcium carbonate  •   dextrose  •  dextrose  •  glucagon (human recombinant)  •  heparin  •  lidocaine  •  melatonin  •  nitroglycerin  •  ondansetron ODT **OR** ondansetron  •  oxybutynin  •  oxyCODONE-acetaminophen  •  sodium chloride  •  sodium chloride  •  sodium chloride  •  sodium chloride  •  sodium chloride  •  sodium chloride  Allergies:    Allergies   Allergen Reactions   • Ceclor [Cefaclor] Rash     Rash in his 50s; he tolerated piperacillin-tazobactam in March 2020       Objective   Exam:     Vital Signs  Temp:  [97.7 °F (36.5 °C)-98.6 °F (37 °C)] 97.7 °F (36.5 °C)  Heart Rate:  [64-75] 75  Resp:  [15-20] 20  BP: (112-150)/(40-66) 127/52  SpO2:  [92 %-100 %] 98 %  on  Flow (L/min) (Oxygen Therapy):  [2] 2;   Device (Oxygen Therapy): nasal cannula  Body mass index is 31.86 kg/m².  EXAM  General:  Chronically ill appearing, in no acute distress.    Head:      Normocephalic and atraumatic.    Eyes:      PERRL/EOM intact, conjunctivae and sclerae clear without nystagmus.    Neck:      No masses, thyromegaly,  trachea central   Lungs:    Clear bilaterally to auscultation.    Heart:      Regular rate and rhythm, no murmur no gallop  Abd:        Soft, nontender, not distended, bowel sounds positive, no shifting dullness.  Msk:        No deformity or scoliosis noted of thoracic or lumbar spine.    Pulses:   Pulses normal in all 4 extremities.    Extremities:    Wound vac right foot  Neuro:    No focal deficits.   alert oriented x3  Skin:       Intact without lesions or rashes.    Psych:    Alert and cooperative; normal mood and affect; normal attention span     Access: [facility]    Results Review:  I have personally reviewed most recent Data :  BMP @LABRCNT(creatinine:10)  CBC    Results from last 7 days   Lab Units 05/22/25  0535 05/21/25  2157 05/21/25  1303   WBC 10*3/mm3 5.56  --  5.94   HEMOGLOBIN g/dL 6.6* 7.1* 6.6*   PLATELETS 10*3/mm3 223  --  294     CMP   Results from last 7 days   Lab Units 05/22/25  0535 05/21/25  1303    SODIUM mmol/L 135* 134*   POTASSIUM mmol/L 3.5 3.9   CHLORIDE mmol/L 96* 94*   CO2 mmol/L 30.0* 32.0*   BUN mg/dL 30* 27*   CREATININE mg/dL 3.60* 2.89*   GLUCOSE mg/dL 50* 110*   ALBUMIN g/dL 2.6* 2.9*   BILIRUBIN mg/dL 0.4 0.3   ALK PHOS U/L 77 92   AST (SGOT) U/L 13 12   ALT (SGPT) U/L 9 9     ABG      XR Shoulder 2+ View Left  Result Date: 5/16/2025  No new fractures are identified. Technically limited exam.  This report was finalized on 5/16/2025 1:53 AM by Dr. Inocencia Cruz M.D on Workstation: [id number]        Results for orders placed during the hospital encounter of 10/03/24    Adult Transthoracic Echo Complete W/ Cont if Necessary Per Protocol    Interpretation Summary  •  Left ventricular ejection fraction appears to be 61 - 65%.  •  Left ventricular diastolic function was indeterminate.  •  The left atrial cavity is moderately dilated.  •  Left atrial volume is moderately increased.  •  There is a bioprosthetic aortic valve present.  •  Estimated right ventricular systolic pressure from tricuspid regurgitation is moderately elevated (45-55 mmHg).        Assessment & Plan   Assessment and Plan:         Anemia    Essential hypertension    Type 2 diabetes mellitus with circulatory disorder, without long-term current use of insulin    SHASTA (obstructive sleep apnea)    H/O aortic valve replacement with porcine valve    Amputated toe of right foot    S/P CABG x 2    Amputated toe of left foot    Atrial fibrillation, persistent    Persistent atrial fibrillation    CKD (chronic kidney disease) stage 4, GFR 15-29 ml/min    Nocturnal hypoxemia    Unsteady gait when walking    Chronic heart failure with preserved ejection fraction (HFpEF)    GI bleed    Peritoneal dialysis status    Hypoalbuminemia    ASSESSMENT:  CKD with hospitalization in March 2025 requiring HD due to KILLIAN likely ATN 2/2 rhabdomyolysis, prerenal insult related to diuretics. OM, now on next stage HD 5 days per week (MTWThF) with THOR  TDC  Acute TYLER, hematuria  Urinary retention  A-fib with controlled rates.  History of HFpEF with pulmonary hypertension  Recent osteomyelitis and abscess right foot  T2DM  HTN  A-fib  CAD  PVD         PLAN :     Will order HD today and continue TTS schedule while inpatient, then resume next stage 5 day/week HD when discharged  Chemistry largely stable  Receiving 1 unit pRBCs, trend H&H, iron profile with elevated ferritin. Will start retacrit  Urology following for urinary retention, hematuria  Johnson placed  F/u cultures  Will monitor and coordinate with team    Thank you for this consultation!    Note transcribed for Dr Sadaf Turner, APRN  [facility]  5/22/2025  12:39 EDT       Patient was seen earlier by  ZEINA. I personally have examined the patient and interviewed the patient. I have reviewed the history, data, problems, assessment and plan with the nurse practitioner during rounds and I concur with the history, exam, assessment and plan as described in the progress note with comments additions, revisions as noted.           Les Talavera MD  5/22/2025  [facility]

## 2025-05-22 NOTE — NURSING NOTE
05/22/25 0954   Wound 03/10/25 0844 Left posterior thigh Other (Comments)   Placement Date/Time: 03/10/25 0844   Present on Original Admission: (c)   Side: Left  Orientation: posterior  Location: thigh  Primary Wound Type: (c) Other (Comments)   Base red  (scaly, peeling skin, excoriated)   Wound 03/13/25 0751 Right lateral foot Surgical Open Surgical Incision   Placement Date/Time: 03/13/25 0751   Side: Right  Orientation: lateral  Location: foot  Primary Wound Type: Surgical  Secondary Wound Type - Surgical: Open Surgical Incision   Base moist;pink   Periwound   (dried, peeling skin)   Wound Length (cm) 3.5 cm   Wound Width (cm) 4 cm   Wound Depth (cm) 0.5 cm   Wound Surface Area (cm^2) 11 cm^2   Wound Volume (cm^3) 3.665 cm^3   Drainage Characteristics/Odor serosanguineous;yellow;green  (green tinged)   Drainage Amount scant   Care, Wound cleansed with;sterile normal saline   Dressing Care dressing changed  (placed NS moist 4x4 gauze - ABD pad - rolled gauze)   Wound 05/21/25 1940 Right posterior thigh Irritant Contact   Placement Date/Time: 05/21/25 1940   Present on Original Admission: Yes  Side: Right  Orientation: (c) posterior  Location: thigh  Primary Wound Type: Irritant Contact   Base moist;red  (excoriation, scaly peeling skin)   Drainage Amount none   Wound 03/08/25 2230 Right gluteal   Placement Date/Time: 03/08/25 2230   Present on Original Admission: No  Side: Right  Location: gluteal   Base red  (skin intact)     Wound/ostomy - consult received regarding wound to R foot and skin excoriation to thighs/gluteals. Patient known to WOC team from prior inpatient admissions. Patient had a fall in March resulting in rhabdo, renal failure and now HD dependent, has had chronic issues with wounds to feet, L foot with transmetatarsal amputation, R lateral foot s/p incision and drainage, partial fourth metatarsal excision on 3/10 for osteomyelitis and abscess per Dr. Mchugh, has been at [facility] with NPWT for  management, vac dressing was removed upon admission here and facility vac in patient's room.     Patient has been admitted due to anemia, was presumed to be anuric, urology placed a catheter and 2300 ml was removed from bladder, F/C has been left in place for urinary retention at the direction of urology APRN, patient will leave in place until f/u outpatient at urology office.     RLE laterally rotates and rests on surface at all times, heel boots don't remain in place well, offloading with a pillow appears to work better, would avoid use of the heel boots and rely on pillows to offload pressure. Wound is pink, has a nonadherent residue, green tinged to removed gauze, mild odor, periwound was moist and wound when vac dressing first removed but it is improved today, there is no charge cord with the machine so it is likely the machine lost charge and therapy had stopped and environment became moist under the drape. Will implement dakins at this time, change 2 times daily, Dr. Mchugh has been consulted, will await and see what his preferences are.      Patient bedfast or w/c dependent, large obese, edematous legs, thighs and gluteals with excoriation/MASD, is gluteals actually look good compared to last admission, skin is intact. Etiology not clear but I suspect that he has had some leaking of urine at the facility and he stays on moist pads, he is presume anuric so frequent enough checks may not occur, combined with rubbing/friction from sitting in w/c. Clobetasol has been ordered per provider. . If this does not improve can try using magic barrier cream. Would also recommend to protect skin to gluteals and posterior thighs/inner thighs with calmoseptine, light dusting of miconazole powder around scrotum, limit skin on skin contact related to scrotum, area is very moist with yeast odor, sling the scrotum with pillow case, wash cloth or male incontinence wrap. Maintaining a clean and dry environment is essential for  management.     Patient will need a Pro + bed for enhanced pressure redistribution and microclimate support, discussed with RN and US. One was requested from [facility]. If none available through [facility], can order a DONAVAN from [employer].

## 2025-05-22 NOTE — PLAN OF CARE
Goal Outcome Evaluation:   Patient alert follows commands med rec faxed from [place] prn pain med given. No nausea noted hgb monitored. No acute distress noted will continue to monitor

## 2025-05-22 NOTE — PROGRESS NOTES
First Urology Note  5/22/2025  13:32 EDT      Notified by RN blood in urine.     Plan  - Initiate CBI. Titrate to maintain a clear/light pink output. Likely exacerbated by significant urinary retention and commonly following sudden release of pressure and capillary rupture after abdi placement.    - NPO MN for possible cystoscopy, fulguration tomorrow  - Hold OAC  - Urology will reassess in the AM    ZEINA Tejada  05/22/25  13:32 EDT

## 2025-05-22 NOTE — CASE MANAGEMENT/SOCIAL WORK
Discharge Planning Assessment  [facility]     Patient Name: Rock Maciel Jr.  MRN: [id number]  Today's Date: 5/22/2025    Admit Date: 5/21/2025    Plan: Plan skilled care at accepting facility - pre cert needed.   MARIANNE Solis RN   Discharge Needs Assessment       Row Name 05/22/25 0801       Living Environment    People in Home facility resident    Current Living Arrangements extended care facility    Potentially Unsafe Housing Conditions none    In the past 12 months has the electric, gas, oil, or water company threatened to shut off services in your home? No    Primary Care Provided by self;other (see comments)  Staff at [facility]    Provides Primary Care For no one, unable/limited ability to care for self    Quality of Family Relationships unable to assess    Able to Return to Prior Arrangements yes    Living Arrangement Comments Pt admitted from [facility]       Resource/Environmental Concerns    Resource/Environmental Concerns none    Transportation Concerns none       Transportation Needs    In the past 12 months, has lack of transportation kept you from medical appointments or from getting medications? no    In the past 12 months, has lack of transportation kept you from meetings, work, or from getting things needed for daily living? No       Food Insecurity    Within the past 12 months, you worried that your food would run out before you got the money to buy more. Never true    Within the past 12 months, the food you bought just didn't last and you didn't have money to get more. Never true       Transition Planning    Patient/Family Anticipates Transition to inpatient rehabilitation facility    Patient/Family Anticipated Services at Transition none    Transportation Anticipated health plan transportation       Discharge Needs Assessment    Readmission Within the Last 30 Days no previous admission in last 30 days    Equipment Currently Used at Home walker, rolling    Concerns to be  Addressed basic needs    Anticipated Changes Related to Illness none    Equipment Needed After Discharge walker, rolling                   Discharge Plan       Row Name 05/22/25 0803       Plan    Plan Plan skilled care at accepting facility - pre cert needed.   MARIANNE Solis RN    Plan Comments FACE SHEET VERIFIED/ IM LETTER SIGNED.  Spoke with pt at bedside.  Pt states he is from [facility] where he has been since March.  Pt states prior he lived in a two story house at [address] [place] [address].  Pt was independent with ADLs.  Pt had  C PAP ,grab bars, chair lift , shower chair and rolling walker for home use.  Pt states he currently has not been able to ambulate.   Mirella  ( [phone]) called to follow.  Pt states he would like to go to [place] because it is closer to home.  Marlys  ( [phone]) called to follow.  Pt is a HD pt.   Plan skilled care at accepting facility - pre cert needed. MARIANNE Solis RN                  Continued Care and Services - Admitted Since 5/21/2025       Destination       Service Provider Request Status Services Address Phone Fax Patient Preferred    [facility] Pending - Request Sent -- [address] [facility] [address] [phone] --    [facility] - [facility] [address] [phone] --                  Selected Continued Care - Prior Encounters Includes continued care and service providers with selected services from prior encounters from 2/20/2025 to 5/22/2025      Discharged on 3/19/2025 Admission date: 3/3/2025 - Discharge disposition: Skilled Nursing Facility (DC - External)      Destination       Service Provider Services Address Phone Fax Patient Preferred    [facility] [address] [phone] --                          Expected Discharge Date and  Time       Expected Discharge Date Expected Discharge Time    May 26, 2025            Demographic Summary       Row Name 05/22/25 0800       General Information    Admission Type inpatient    Arrived From emergency department    Required Notices Provided Important Message from Medicare    Referral Source admission list    Reason for Consult discharge planning    Preferred Language English                   Functional Status       Row Name 05/22/25 0801       Functional Status    Usual Activity Tolerance moderate    Current Activity Tolerance fair       Functional Status, IADL    Medications completely dependent    Meal Preparation completely dependent    Housekeeping completely dependent    Laundry completely dependent    Shopping completely dependent       Mental Status    General Appearance WDL WDL                   Psychosocial    No documentation.                  Abuse/Neglect    No documentation.                  Legal    No documentation.                  Substance Abuse    No documentation.                  Patient Forms    No documentation.                     Emily Solis, AGBO

## 2025-05-22 NOTE — CONSULTS
First Urology Consult  Patient Identification:  NAME:  Rock Maciel Jr.  Age:  79 y.o.   Sex:  male   :  1945   MRN:  [id number]     Chief complaint: Abnormal labs    History of present illness:    Pt is a 79 y.o. male with a history of urinary retention and ESRD on peritoneal dialysis who presented to the ED on 25 for rehab facility for low hemoglobin. Patient also reported some blood in his urine x 2 weeks. He had a positive stool guaiac in ED. He was diagnosed and admitted for rectal bleeding and anemia. Urology was consulted for evaluation and treatment of hematuria and associated anemia. Patient reports that bleeding had started roughly 2 weeks ago. Urology saw during prior admission in March for urinary retention. He had an indwelling abdi placed at that time and our recommendation was for a VT prior to discharge. Patient reports that he was urinating well following removal so catheter did not need to be replaced. While a rehab he noticed his urine output declining so he assumed that it was due to his daily PD. He reports that facility attempted to insert a abdi but it was very painful and they were unable to successfully place. He has noticed bloody urine in his brief since this time. Patient denies history of hematuria. He does have known BPH.  Bladder scan results at the bedside yielded  >999 mL in patient's bladder. He is currently receiving 1u pRBCs. Patient reports supapubic discomfort with palpation and lower abdominal distention. Patient's Plavix and Eliquis are now being held.     In hospital:  -AVSS, no documented UOP  -WBC - 5.56 (5.94)  -Creat - 3.60 (2.89) - currently getting HD 3x weekly  - Hgb - 6.6 (7.1) - Baseline 8.8-9.5  -UA - No results available    Asked to see    Past medical history:  Past Medical History:   Diagnosis Date    Acquired absence of left foot 2022    Acute osteomyelitis of right foot 2025    Allergic rhinitis     Amputated toe of left  foot 02/12/2021    Amputated toe of right foot 04/11/2019    ATN (acute tubular necrosis) 03/05/2025    Atrial fibrillation, persistent 10/18/2024    Bladder wall thickening 12/01/2024    Bronchitis     Carotid stenosis, asymptomatic 01/30/2017    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  Added automatically from request for surgery [id number]      Gout     H/O aortic valve replacement with porcine valve 09/10/2018    Hiatal hernia     History of MRSA infection     RIGHT FOOT 2010    Hyperlipidemia     Hypertension     LAFB (left anterior fascicular block) 05/17/2016    MRSA (methicillin resistant Staphylococcus aureus) infection 03/08/2025    Murmur     Neuropathic arthropathy 05/17/2016    Overview:   2015 IMO UPDATE  Overview:   2015 IMO UPDATE      Nocturnal hypoxemia 12/01/2024    Nonrheumatic aortic valve stenosis 01/30/2017    Persistent atrial fibrillation 11/07/2024    Pneumothorax on right     S/P CABG x 2 05/06/2019 July 2018      Sleep apnea     pt wears CPAP at night    Status post left inguinal hernia repair, follow-up exam 01/05/2025    Traumatic rhabdomyolysis 03/03/2025    Type 2 diabetes mellitus with circulatory disorder, without long-term current use of insulin 05/17/2016    Unsteady gait when walking 01/05/2025    Wellness examination 06/24/2015    Annual Wellness Visit       Past surgical history:  Past Surgical History:   Procedure Laterality Date    ARTERY SURGERY Bilateral     carotid    BONE EXCISION LEG Right  3/10/2025    Procedure: BONE EXCISION LOWER EXTERMITY, RIGHT;  Surgeon: Jimenez Mchugh DPM;  Location: [facility] MAIN OR;  Service: Podiatry;  Laterality: Right;    CARDIAC CATHETERIZATION N/A 7/20/2018    Procedure: Left Heart Cath;  Surgeon: Jo Toney MD;  Location: [facility] CATH INVASIVE LOCATION;  Service: Cardiovascular    CARDIAC CATHETERIZATION N/A 7/20/2018    Procedure: Coronary angiography;  Surgeon: Jo Toney MD;  Location: [facility] CATH INVASIVE LOCATION;  Service: Cardiovascular    CAROTID ENDARTERECTOMY Bilateral     COLONOSCOPY  2008    CORONARY ARTERY BYPASS GRAFT WITH AORTIC VALVE REPAIR/REPLACEMENT N/A 7/23/2018    Procedure: INTRAOPERATIVE ALEX, MIDLINE STERNOTOMY, CORONARY ARTERY BYPASS GRAFTING X 3 USING ENDOSCOPICALLY HARVESTED LEFT GREATER SAPHENOUS VEIN,  AORTIC VALVE REPLACEMENT USING 25MM LOPEZ II ULTRA PORCINE VALVE, PRP;  Surgeon: Phong Posey MD;  Location: [facility] MAIN OR;  Service: Cardiothoracic    FOOT SURGERY Right 2010    5th digit removal    FOOT SURGERY Left 2011    1 digit removed    INCISION AND DRAINAGE LEG Right 3/28/2020    Procedure: DEBRIDMENT OF RIGHT CALF;  Surgeon: Ross Pineda MD;  Location: [facility] MAIN OR;  Service: Vascular;  Laterality: Right;    INCISION AND DRAINAGE LEG Right 3/10/2025    Procedure: INCISION AND DRAINAGE LOWER EXTREMITY;  Surgeon: Jimenez Mchugh DPM;  Location: [facility] MAIN OR;  Service: Podiatry;  Laterality: Right;    INGUINAL HERNIA REPAIR Left 11/29/2024    Procedure: INGUINAL HERNIA REPAIR LAPAROSCOPIC WITH DAVINCI ROBOT;  Surgeon: Phong Lazo MD;  Location: [facility] MAIN OR;  Service: Robotics - DaVinci;  Laterality: Left;    INSERTION HEMODIALYSIS CATHETER N/A 3/11/2025    Procedure: HEMODIALYSIS CATHETER INSERTION;  Surgeon: Jeaneth Bonds MD;  Location: [facility] MAIN OR;  Service: Vascular;  Laterality: N/A;    QUADRICEPS TENDON REPAIR Left 5/14/2019    Procedure: Left QUADRICEPS TENDON  REPAIR;  Surgeon: Camilo Hunter MD;  Location: [facility] MAIN OR;  Service: Orthopedics    THORACENTESIS Right 11/21/2016    THORACOSCOPY Right 5/8/2017    Procedure: BRONCHOSCOPY, RIGHT VAT,  TOTAL DECORTICATION RIGHT LUNG, PLEURAL BX, PLACEMENT SUBPLEURAL PAIN CAATHETERS X2;  Surgeon: Donald Orlando III, MD;  Location: [facility] MAIN OR;  Service:     TONSILECTOMY, ADENOIDECTOMY, BILATERAL MYRINGOTOMY AND TUBES      WOUND DEBRIDEMENT Right 3/13/2025    Procedure: DEBRIDEMENT FOOT, RIGHT;  Surgeon: Jimenez Mchugh DPM;  Location: [facility] MAIN OR;  Service: Podiatry;  Laterality: Right;       Allergies:  Ceclor [cefaclor]    Home medications:  Medications Prior to Admission   Medication Sig Dispense Refill Last Dose/Taking    amiodarone (PACERONE) 200 MG tablet Take 1 tablet by mouth Daily. 30 tablet 5 5/21/2025    apixaban (ELIQUIS) 2.5 MG tablet tablet Take 1 tablet by mouth 2 (Two) Times a Day. Indications: Atrial Fibrillation 60 tablet 5 5/21/2025    atorvastatin (LIPITOR) 20 MG tablet Take 1 tablet by mouth Every Night. NEED TO SEE PROVIDER FOR FUTURE REFILLS. 30 tablet 0 5/20/2025    bisacodyl (DULCOLAX) 10 MG suppository Insert 1 suppository into the rectum Daily As Needed for Constipation.   Past Month    bumetanide (BUMEX) 2 MG tablet Take 1 tablet by mouth 3 (Three) Times a Day.   5/21/2025    clopidogrel (PLAVIX) 75 MG tablet Take 1 tablet by mouth Daily. 90 tablet 1 5/21/2025    diphenhydrAMINE (BENADRYL) 25 mg capsule Take 1 capsule by mouth 3 (Three) Times a Day As Needed for Itching.   Past Month    glipizide (GLUCOTROL) 5 MG tablet Take 0.5 tablets by mouth 2 (Two) Times a Day Before Meals. Indications: Type 2 Diabetes   5/21/2025    glucose blood (Accu-Chek Keshia Plus) test strip USE TO TEST BLOOD SUGAR    TWICE DAILY FOR DIABETES 100 each 8 5/21/2025    hydrALAZINE (APRESOLINE) 10 MG tablet Take 1 tablet by mouth 3 (Three) Times a Day.   5/21/2025    insulin lispro (HUMALOG/ADMELOG) 100 UNIT/ML  injection Inject 2-9 Units under the skin into the appropriate area as directed 4 (Four) Times a Day Before Meals & at Bedtime.   5/21/2025    lidocaine (LIDODERM) 5 % Place 1 patch on the skin as directed by provider Daily. Remove & Discard patch within 12 hours or as directed by MD  apply to left shoulder   5/21/2025    linagliptin (Tradjenta) 5 MG tablet tablet Take 1 tablet by mouth Daily. 90 tablet 1 5/21/2025    melatonin 5 MG tablet tablet Take 1 tablet by mouth Every Night.   5/20/2025    metoprolol succinate XL (TOPROL-XL) 25 MG 24 hr tablet Take 1 tablet by mouth Daily. 30 tablet 5 5/21/2025    nitroglycerin (NITROSTAT) 0.4 MG SL tablet Place 1 tablet under the tongue Every 5 (Five) Minutes As Needed for Chest Pain (Only if SBP Greater Than 100). Take no more than 3 doses in 15 minutes. 25 tablet 0 Past Month    ondansetron ODT (ZOFRAN-ODT) 4 MG disintegrating tablet Take 1 tablet by mouth Every 6 (Six) Hours As Needed for Nausea or Vomiting.   Past Month    polyethylene glycol (MIRALAX) 17 g packet Take 17 g by mouth Daily.   Past Month    senna 8.6 MG tablet Take 1 tablet by mouth Daily.   5/21/2025    sevelamer (RENVELA) 800 MG tablet Take 1 tablet by mouth 3 (Three) Times a Day With Meals. (Patient taking differently: Take 1 tablet by mouth Daily With Lunch.)   5/21/2025    terazosin (HYTRIN) 2 MG capsule Take 1 capsule by mouth Every Night. 90 capsule 1 5/20/2025    traZODone (DESYREL) 50 MG tablet Take 1.5 tablets by mouth Every Night.   Past Week Bedtime    vitamin C (ASCORBIC ACID) 250 MG tablet Take 1 tablet by mouth Daily.   5/21/2025    Zinc 50 MG tablet    5/21/2025    acetaminophen (TYLENOL) 325 MG tablet Take 2 tablets by mouth Every 6 (Six) Hours As Needed for Mild Pain.   5/13/2025    Cholecalciferol 25 MCG (1000 UT) tablet Take 1 tablet by mouth Every 7 (Seven) Days.   5/20/2025        Hospital medications:  amiodarone, 200 mg, Oral, Daily  [Held by provider] apixaban, 2.5 mg, Oral,  BID  vitamin C, 250 mg, Oral, Daily  atorvastatin, 20 mg, Oral, Nightly  bumetanide, 2 mg, Oral, TID  cetaphil, , Topical, Q12H  clobetasol propionate, 1 Application, Topical, Q12H  [Held by provider] clopidogrel, 75 mg, Oral, Daily  glipizide, 2.5 mg, Oral, BID AC  hydrALAZINE, 10 mg, Oral, TID  insulin lispro, 2-7 Units, Subcutaneous, 4x Daily AC & at Bedtime  linagliptin, 5 mg, Oral, Daily  metoprolol succinate XL, 25 mg, Oral, Daily  pantoprazole, 40 mg, Intravenous, BID AC  sevelamer, 800 mg, Oral, TID With Meals  sodium chloride, 10 mL, Intravenous, Q12H  sodium chloride, 10 mL, Intravenous, Q12H  sodium chloride, 10 mL, Intravenous, Q12H  terazosin, 2 mg, Oral, Nightly           acetaminophen **OR** acetaminophen **OR** acetaminophen    senna-docusate sodium **AND** polyethylene glycol **AND** bisacodyl **AND** bisacodyl    calcium carbonate    dextrose    dextrose    glucagon (human recombinant)    heparin    lidocaine    melatonin    nitroglycerin    ondansetron ODT **OR** ondansetron    oxybutynin    sodium chloride    sodium chloride    sodium chloride    sodium chloride    sodium chloride    sodium chloride    Family history:  Family History   Problem Relation Age of Onset    Hypertension Father     Malig Hyperthermia Neg Hx        Social history:  Social History     Tobacco Use    Smoking status: Never     Passive exposure: Never    Smokeless tobacco: Never   Vaping Use    Vaping status: Never Used   Substance Use Topics    Alcohol use: Not Currently     Alcohol/week: 7.0 standard drinks of alcohol     Types: 7 Drinks containing 0.5 oz of alcohol per week    Drug use: Never       Review of Systems:     12 point negative except as in HPI    Objective:  TMax 24 hours:   Temp (24hrs), Av.1 °F (36.7 °C), Min:97 °F (36.1 °C), Max:98.6 °F (37 °C)      Vitals Ranges:   Temp:  [97 °F (36.1 °C)-98.6 °F (37 °C)] 97.7 °F (36.5 °C)  Heart Rate:  [64-75] 75  Resp:  [15-20] 20  BP: (112-150)/(40-66)  131/53    Intake/Output Last 3 shifts:  I/O last 3 completed shifts:  In: 590 [P.O.:240; Blood:350]  Out: -      Physical Exam:    General Appearance:    Alert, cooperative, NAD   Abdomen:    :      Soft, lower abdominal distention, significant palpable bladder distension, mildly tender to palpation    Old dried blood noted at the urethral meatus   Neuro/Psych:   Orientation intact, mood/affect pleasant       Results review:   I reviewed the patient's new clinical results.    Data review:  Lab Results (last 24 hours)       Procedure Component Value Units Date/Time    Folate RBC [[id number]] Collected: 05/22/25 0839    Specimen: Blood Updated: 05/22/25 0851    T4, Free [[id number]]  (Normal) Collected: 05/22/25 0535    Specimen: Blood Updated: 05/22/25 0749     Free T4 1.38 ng/dL     TSH Rfx On Abnormal To Free T4 [[id number]]  (Abnormal) Collected: 05/22/25 0535    Specimen: Blood Updated: 05/22/25 0718     TSH 4.480 uIU/mL     Magnesium [[id number]]  (Normal) Collected: 05/22/25 0535    Specimen: Blood Updated: 05/22/25 0715     Magnesium 2.0 mg/dL     Comprehensive Metabolic Panel [[id number]]  (Abnormal) Collected: 05/22/25 0535    Specimen: Blood Updated: 05/22/25 0715     Glucose 50 mg/dL      BUN 30 mg/dL      Creatinine 3.60 mg/dL      Sodium 135 mmol/L      Potassium 3.5 mmol/L      Chloride 96 mmol/L      CO2 30.0 mmol/L      Calcium 8.2 mg/dL      Total Protein 6.3 g/dL      Albumin 2.6 g/dL      ALT (SGPT) 9 U/L      AST (SGOT) 13 U/L      Alkaline Phosphatase 77 U/L      Total Bilirubin 0.4 mg/dL      Globulin 3.7 gm/dL      A/G Ratio 0.7 g/dL      BUN/Creatinine Ratio 8.3     Anion Gap 9.0 mmol/L      eGFR 16.4 mL/min/1.73     Narrative:      GFR Categories in Chronic Kidney Disease (CKD)              GFR Category          GFR (mL/min/1.73)    Interpretation  G1                    90 or greater        Normal or high (1)  G2                    60-89                Mild decrease (1)  G3a                    45-59                Mild to moderate decrease  G3b                   30-44                Moderate to severe decrease  G4                    15-29                Severe decrease  G5                    14 or less           Kidney failure    (1)In the absence of evidence of kidney disease, neither GFR category G1 or G2 fulfill the criteria for CKD.    eGFR calculation 2021 CKD-EPI creatinine equation, which does not include race as a factor    CBC & Differential [[id number]]  (Abnormal) Collected: 05/22/25 0535    Specimen: Blood Updated: 05/22/25 0705    Narrative:      The following orders were created for panel order CBC & Differential.  Procedure                               Abnormality         Status                     ---------                               -----------         ------                     CBC Auto Differential[[id number]]        Abnormal            Final result                 Please view results for these tests on the individual orders.    CBC Auto Differential [[id number]]  (Abnormal) Collected: 05/22/25 0535    Specimen: Blood Updated: 05/22/25 0705     WBC 5.56 10*3/mm3      RBC 2.12 10*6/mm3      Hemoglobin 6.6 g/dL      Hematocrit 19.5 %      MCV 92.0 fL      MCH 31.1 pg      MCHC 33.8 g/dL      RDW 15.7 %      RDW-SD 52.2 fl      MPV 9.1 fL      Platelets 223 10*3/mm3      Neutrophil % 68.1 %      Lymphocyte % 14.0 %      Monocyte % 7.6 %      Eosinophil % 9.4 %      Basophil % 0.4 %      Immature Grans % 0.5 %      Neutrophils, Absolute 3.79 10*3/mm3      Lymphocytes, Absolute 0.78 10*3/mm3      Monocytes, Absolute 0.42 10*3/mm3      Eosinophils, Absolute 0.52 10*3/mm3      Basophils, Absolute 0.02 10*3/mm3      Immature Grans, Absolute 0.03 10*3/mm3      nRBC 0.0 /100 WBC     Calcium, Ionized [[id number]]  (Normal) Collected: 05/22/25 0535    Specimen: Blood Updated: 05/22/25 0705     Ionized Calcium 1.17 mmol/L     Narrative:      This test was developed, its performance characteristics  determined and judged suitable for clinical purposes by [facility].  The specimen type utilized for this test has not been cleared or approved by the FDA.  The laboratory is regulated under CLIA as qualified to perform high-complexity testing.    POC Glucose Once [[id number]]  (Normal) Collected: 05/22/25 0701    Specimen: Blood Updated: 05/22/25 0702     Glucose 88 mg/dL     POC Glucose Once [[id number]]  (Abnormal) Collected: 05/22/25 0624    Specimen: Blood Updated: 05/22/25 0628     Glucose 60 mg/dL     Hemoglobin & Hematocrit, Blood [[id number]]  (Abnormal) Collected: 05/21/25 2157    Specimen: Blood Updated: 05/21/25 2215     Hemoglobin 7.1 g/dL      Hematocrit 22.5 %     [facility] PCR - Swab, Axilla Right, Axilla Left and Groin [[id number]] Collected: 05/21/25 1812    Specimen: Swab from Axilla Right, Axilla Left and Groin Updated: 05/21/25 2120    POC Glucose Once [[id number]]  (Normal) Collected: 05/21/25 2113    Specimen: Blood Updated: 05/21/25 2116     Glucose 98 mg/dL     Sedimentation Rate [[id number]]  (Abnormal) Collected: 05/21/25 1303    Specimen: Blood from Arm, Right Updated: 05/21/25 2033     Sed Rate 24 mm/hr     Reticulocytes [[id number]]  (Abnormal) Collected: 05/21/25 1303    Specimen: Blood from Arm, Right Updated: 05/21/25 2028     Reticulocyte % 2.74 %      Reticulocyte Absolute 0.0600 10*6/mm3     POC Glucose Once [[id number]]  (Normal) Collected: 05/21/25 1809    Specimen: Blood Updated: 05/21/25 1811     Glucose 103 mg/dL     Protime-INR [[id number]]  (Abnormal) Collected: 05/21/25 1303    Specimen: Blood from Arm, Right Updated: 05/21/25 1722     Protime 17.7 Seconds      INR 1.46    Vitamin B12 [[id number]]  (Normal) Collected: 05/21/25 1303    Specimen: Blood from Arm, Right Updated: 05/21/25 1649     Vitamin B-12 702 pg/mL     Narrative:      Results may be falsely increased if patient taking Biotin.      C-reactive Protein [[id number]]  (Abnormal) Collected:  05/21/25 1303    Specimen: Blood from Arm, Right Updated: 05/21/25 1649     C-Reactive Protein 7.70 mg/dL     Ferritin [[id number]]  (Abnormal) Collected: 05/21/25 1303    Specimen: Blood from Arm, Right Updated: 05/21/25 1632     Ferritin 1,417.00 ng/mL     Narrative:      Results may be falsely decreased if patient taking Biotin.      Iron [[id number]]  (Abnormal) Collected: 05/21/25 1303    Specimen: Blood from Arm, Right Updated: 05/21/25 1626     Iron 49 mcg/dL     Iron Profile [[id number]]  (Abnormal) Collected: 05/21/25 1303    Specimen: Blood from Arm, Right Updated: 05/21/25 1626     Iron 49 mcg/dL      Iron Saturation (TSAT) 27 %      Transferrin 122 mg/dL      TIBC 182 mcg/dL     CK [[id number]]  (Abnormal) Collected: 05/21/25 1303    Specimen: Blood from Arm, Right Updated: 05/21/25 1626     Creatine Kinase 19 U/L     CBC & Differential [[id number]]  (Abnormal) Collected: 05/21/25 1303    Specimen: Blood from Arm, Right Updated: 05/21/25 1337    Narrative:      The following orders were created for panel order CBC & Differential.  Procedure                               Abnormality         Status                     ---------                               -----------         ------                     CBC Auto Differential[[id number]]        Abnormal            Final result                 Please view results for these tests on the individual orders.    CBC Auto Differential [[id number]]  (Abnormal) Collected: 05/21/25 1303    Specimen: Blood from Arm, Right Updated: 05/21/25 1337     WBC 5.94 10*3/mm3      RBC 2.18 10*6/mm3      Hemoglobin 6.6 g/dL      Hematocrit 21.4 %      MCV 98.2 fL      MCH 30.3 pg      MCHC 30.8 g/dL      RDW 14.7 %      RDW-SD 53.3 fl      MPV 9.0 fL      Platelets 294 10*3/mm3      Neutrophil % 74.4 %      Lymphocyte % 9.9 %      Monocyte % 7.7 %      Eosinophil % 7.4 %      Basophil % 0.3 %      Immature Grans % 0.3 %      Neutrophils, Absolute 4.41 10*3/mm3      Lymphocytes,  Absolute 0.59 10*3/mm3      Monocytes, Absolute 0.46 10*3/mm3      Eosinophils, Absolute 0.44 10*3/mm3      Basophils, Absolute 0.02 10*3/mm3      Immature Grans, Absolute 0.02 10*3/mm3      nRBC 0.0 /100 WBC     Comprehensive Metabolic Panel [[id number]]  (Abnormal) Collected: 05/21/25 1303    Specimen: Blood from Arm, Right Updated: 05/21/25 1336     Glucose 110 mg/dL      BUN 27 mg/dL      Creatinine 2.89 mg/dL      Sodium 134 mmol/L      Potassium 3.9 mmol/L      Chloride 94 mmol/L      CO2 32.0 mmol/L      Calcium 8.5 mg/dL      Total Protein 6.9 g/dL      Albumin 2.9 g/dL      ALT (SGPT) 9 U/L      AST (SGOT) 12 U/L      Alkaline Phosphatase 92 U/L      Total Bilirubin 0.3 mg/dL      Globulin 4.0 gm/dL      A/G Ratio 0.7 g/dL      BUN/Creatinine Ratio 9.3     Anion Gap 8.0 mmol/L      eGFR 21.3 mL/min/1.73     Narrative:      GFR Categories in Chronic Kidney Disease (CKD)              GFR Category          GFR (mL/min/1.73)    Interpretation  G1                    90 or greater        Normal or high (1)  G2                    60-89                Mild decrease (1)  G3a                   45-59                Mild to moderate decrease  G3b                   30-44                Moderate to severe decrease  G4                    15-29                Severe decrease  G5                    14 or less           Kidney failure    (1)In the absence of evidence of kidney disease, neither GFR category G1 or G2 fulfill the criteria for CKD.    eGFR calculation 2021 CKD-EPI creatinine equation, which does not include race as a factor    [place] Draw [[id number]] Collected: 05/21/25 1303    Specimen: Blood from Arm, Right Updated: 05/21/25 1315    Narrative:      The following orders were created for panel order [place] Draw.  Procedure                               Abnormality         Status                     ---------                               -----------         ------                     Green Top (Gel)[[id number]]                                   Final result               Lavender Top[[id number]]                                     Final result               Gold Top - SST[[id number]]                                   Final result               Light Blue Top[[id number]]                                   Final result                 Please view results for these tests on the individual orders.    Green Top (Gel) [[id number]] Collected: 05/21/25 1303    Specimen: Blood from Arm, Right Updated: 05/21/25 1315     Extra Tube Hold for add-ons.     Comment: Auto resulted.       Lavender Top [[id number]] Collected: 05/21/25 1303    Specimen: Blood from Arm, Right Updated: 05/21/25 1315     Extra Tube hold for add-on     Comment: Auto resulted       Gold Top - SST [[id number]] Collected: 05/21/25 1303    Specimen: Blood from Arm, Right Updated: 05/21/25 1315     Extra Tube Hold for add-ons.     Comment: Auto resulted.       Light Blue Top [[id number]] Collected: 05/21/25 1303    Specimen: Blood from Arm, Right Updated: 05/21/25 1315     Extra Tube Hold for add-ons.     Comment: Auto resulted                Imaging:  Imaging Results (Last 24 Hours)       ** No results found for the last 24 hours. **             Assessment:     Gross hematuria   Urinary retention    Procedure:  Patient was instructed on need for urethral catheter for urinary retention and need for manual bladder irrigation to remove possible blood clots and debris from bladder. Verbal consent was obtained.    Perineal hygiene given  Patient was prepped and draped in sterile fashion.  Using sterile technique a 22 ch Luanne catheter was placed.  Balloon inflated with 15 mL of water  Closed drainage system attached  2500 mL of brown urine immediately returned to bag  Use a sterile irrigation tray and quinton syringe, the catheter bag was removed and saline was instilled into the bladder in increments of 50-60 mL  Manual irrigation was performed to remove the instilled saline along  with any debris or clots  Output appeared brown with no visible clots   Catheter irrigated without difficulty  A total of 100 cc of sterile saline was used with full return of fluids  Closed drainage system reconnected  Patient tolerated well      Plan:   -  Continue indwelling abdi catheter. Irrigation port plugged. There were no signs of active bleeding. Output appeared brown. If output begins to turn red, can initiate CBI if needed. Will likely need catheter at discharge given chronic retention until he is able to be seen in our office for follow-up. Can likely exchange current catheter for a 18 fr coude before discharge.   -  Monitor urine output closely, including color, clarity, and clots.   -  Manually irrigate catheter PRN for suspected catheter obstruction s/t clot  -  Monitor H/H. Primary team managing transfusions.   -  Patient will need cystoscopy at some point. If unable to identify other possible source for low hemoglobin and no improvement, may consider while current admission if OR and surgeon availability. It appears that GI is also planning for EGD per patient.   - Urine sent for UA and culture if indicated.   - Urology will continue to follow    Ivette Mancia, ZEINA  05/22/25    Plan reviewed and discussed with Dr. Gutierrez

## 2025-05-22 NOTE — NURSING NOTE
Nurse called [facility] and spoke with nurse that normally takes care of patient, Patsy nurse was given fax number to fax med rec to [phone]. Still waiting for med rec, will continue to monitor

## 2025-05-22 NOTE — PLAN OF CARE
Goal Outcome Evaluation:  Plan of Care Reviewed With: patient        Progress: no change  Outcome Evaluation: Pt AOx4. On 2L NC. NSR on monitor w/ BBB. GI saw pt this am- possiby EGD in a couple of days- keeping eliquis/plavix on hold. Urology at bedside this am wanting bladder scan- >999. 22 rousche 3 day catheter placed- First urine return was dark brown (2,500ml). Pt started having bright red blood with clots- which urology expected- Plan to start bladder scan per urology. Pt having urethra pain- prn pain meds per MAR. Skin care per orders- pro plus bed ordered. Creams added to skin. Foot wound changed per WOCCN- Podiatry consulted. Got 1 unit PRBC's today- redraw this evening. Nephrology saw pt and ordered HD- orders called in. Pt not in acute distress. Plan of care ongoing     Got extra unit of blood with HD this evening. Remains on CBI wide open with clear red urine

## 2025-05-22 NOTE — CONSULTS
Nutrition Services    Patient Name: Rock Maciel Jr.  YOB: 1945  MRN: [id number]  Admission date: 5/21/2025    Assessment Date:  05/22/25    NUTRITION EVALUATION      Reason for Encounter Malnutrition Severity Assessment, MST score 2+   Diagnosis/Problem Admission Diagnosis:  Acute blood loss anemia [D62]  Rectal bleeding [K62.5]  Anemia [D64.9]  GI bleed [K92.2]  ESRD on hemodialysis [N18.6, Z99.2]    Problem List:    Anemia    Essential hypertension    Type 2 diabetes mellitus with circulatory disorder, without long-term current use of insulin    SHASTA (obstructive sleep apnea)    H/O aortic valve replacement with porcine valve    Amputated toe of right foot    S/P CABG x 2    Amputated toe of left foot    Atrial fibrillation, persistent    Persistent atrial fibrillation    CKD (chronic kidney disease) stage 4, GFR 15-29 ml/min    Nocturnal hypoxemia    Unsteady gait when walking    Chronic heart failure with preserved ejection fraction (HFpEF)    GI bleed    Peritoneal dialysis status    Hypoalbuminemia     Narrative Pt stated he came from rehab facility where he had not been eating most days due to not liking their food. He stated he was supposed to be put on supplement shakes but did not received them. Pt stated he has been eating much better since being admitted and has finished his plates and enjoys the Boost he has been sent. Discussed importance of PO intake and how protein helps to preserve muscle mass. Encouraged pt to prioritize protein and eating all of his food while in patient and at discharge.        PO Diet Diet: Cardiac; Healthy Heart (2-3 Na+); Fluid Consistency: Thin (IDDSI 0)  NPO Diet NPO Type: Sips with Meds   Allergies NKFA   Supplements Boost Glucose Control, Daily   PO Intake % 100% x 1 meal       Chewing/Swallowing Difficulty no issues identified at this time       Medications reviewed, Vitamin C, glipizide, insulin, protonix   Labs  reviewed        Physical Findings alert,  "oriented, on oxygen therapy     Edema lower extremity , 2+ (mild)    GI Function WDL, fecal incontinence, last bowel movement: 5/21   Skin Status other: Irritant contact, location of wound: Right posterior thighh     Lines/Drains dialysis catheter   I/O net fluid loss and amount/timeframe:1900mL since admission         Height  Weight  BMI  Weight Trend     Height: 185.4 cm (72.99\")  Weight: 110 kg (241 lb 6.5 oz) (05/22/25 0500)  Body mass index is 31.86 kg/m².  Loss    Weight change: weight loss of 20 lbs (7%) over 2 month(s)    Significant?  Yes       NFPE See Malnutrition Severity Assessment      Malnutrition Severity Assessment      Flowsheet Row Most Recent Value   Insufficient Energy Intake     Insufficient Energy Intake Findings Moderate   Insufficient Energy Intake  <75% of est. energy requirement for > or equal to 1 month   Unintentional Weight Loss     Unintentional Weight Loss Findings Moderate   Unintentional Weight Loss  Weight loss of 7.5% in three months   Muscle Loss    Loss of Muscle Mass Findings Mild   Episcop[place] Region Moderate - slight depression   Clavicle Bone Region Moderate - some protrusion in females, visible in males   Scapular Bone Region Moderate - mild depression, bones may show slightly   Dorsal Hand Region Moderate - slight depression   Patellar Region Moderate - patella more prominent, less muscle definition around patella   Fat Loss    Subcutaneous Fat Loss Findings Moderate   Orbital Region  None   Upper Arm Region Moderate - some fat tissue, not ample   Fluid Accumulation (Edema)    Fluid Acumulation Findings Moderate   Fluid Accumulation  Moderate equals 2+ pitting edema                Nutrition Problem (PES) Problem: Malnutrition (moderate)  Etiology: Factors Affecting Nutrition - Intolerance of diet at rehab facility    Signs/Symptoms: Report of Minimal PO Intake, NFPE, and 7% weight loss.        Intervention/Plan Continue to provide Boost GC at lunch. Offer more frequent at " follow up if intake falls back down. Continue to encourage good PO intake.     RD to follow up per protocol.     Results from last 7 days   Lab Units 05/22/25  0535 05/21/25  1303   SODIUM mmol/L 135* 134*   POTASSIUM mmol/L 3.5 3.9   CHLORIDE mmol/L 96* 94*   CO2 mmol/L 30.0* 32.0*   BUN mg/dL 30* 27*   CREATININE mg/dL 3.60* 2.89*   CALCIUM mg/dL 8.2* 8.5*   BILIRUBIN mg/dL 0.4 0.3   ALK PHOS U/L 77 92   ALT (SGPT) U/L 9 9   AST (SGOT) U/L 13 12   GLUCOSE mg/dL 50* 110*     Results from last 7 days   Lab Units 05/22/25  0535   MAGNESIUM mg/dL 2.0   HEMOGLOBIN g/dL 6.6*   HEMATOCRIT % 19.5*     Lab Results   Component Value Date    HGBA1C 6.00 (H) 03/03/2025     Wt Readings from Last 10 Encounters:   05/22/25 110 kg (241 lb 6.5 oz)   03/18/25 119 kg (262 lb 5.6 oz)   12/20/24 122 kg (268 lb)   12/20/24 122 kg (268 lb)   11/30/24 127 kg (279 lb 1.6 oz)   11/21/24 119 kg (262 lb)   11/19/24 119 kg (262 lb)   11/15/24 126 kg (278 lb)   10/03/24 (!) 137 kg (302 lb)   05/17/24 (!) 137 kg (301 lb 9.6 oz)       Electronically signed by:  Delmy Raymundo RD  05/22/25 11:30 EDT

## 2025-05-22 NOTE — CASE MANAGEMENT/SOCIAL WORK
Continued Stay Note  [facility]     Patient Name: Rock Maciel Jr.  MRN: [id number]  Today's Date: 5/22/2025    Admit Date: 5/21/2025    Plan: Plan skilled care at accepting facility - pre cert needed.   MARIANNE Solis RN   Discharge Plan       Row Name 05/22/25 0803       Plan    Plan Plan skilled care at accepting facility - pre cert needed.   MARIANNE Solis RN    Plan Comments FACE SHEET VERIFIED/ IM LETTER SIGNED.  Spoke with pt at bedside.  Pt states he is from [facility] where he has been since March.  Pt states prior he lived in a two story house at [address] [place] [address].  Pt was independent with ADLs.  Pt had  C PAP ,grab bars, chair lift , shower chair and rolling walker for home use.  Pt states he currently has not been able to ambulate.   Mirella  ( [phone]) called to follow.  Pt states he would like to go to [place] because it is closer to home.  Marlys  ( [phone]) called to follow.  Pt is a HD pt.   Plan skilled care at accepting facility - pre cert needed. MARIANNE Solis RN                   Discharge Codes    No documentation.                 Expected Discharge Date and Time       Expected Discharge Date Expected Discharge Time    May 26, 2025               Emily Solis RN

## 2025-05-22 NOTE — NURSING NOTE
Diabetes Education    Patient Name:  Rock Maciel Jr.  YOB: 1945  MRN: [id number]  Admit Date:  5/21/2025      Noted consult for diabetes educator to see due to pt refusing fingersticks, using earlobe for BG checks. Met with pt at bedside. Reports he does not like fingersticks because they hurt, and he does BG checks on arm at facility. Offered pt to use smaller lancing device, but he is repeatedly declining finger, and would like to use arm or earlobe. Pt could have alternating testing site on arm if ordered by MD. Information related to RN.       Electronically signed by:  Myles Valles RN  05/22/25 11:54 EDT

## 2025-05-23 ENCOUNTER — APPOINTMENT (OUTPATIENT)
Dept: CARDIOLOGY | Facility: HOSPITAL | Age: 80
End: 2025-05-23
Payer: MEDICARE

## 2025-05-23 ENCOUNTER — INPATIENT HOSPITAL (OUTPATIENT)
Dept: URBAN - METROPOLITAN AREA HOSPITAL 113 | Facility: HOSPITAL | Age: 80
End: 2025-05-23
Payer: MEDICARE

## 2025-05-23 DIAGNOSIS — R19.5 OTHER FECAL ABNORMALITIES: ICD-10-CM

## 2025-05-23 DIAGNOSIS — N18.4 CHRONIC KIDNEY DISEASE, STAGE 4 (SEVERE): ICD-10-CM

## 2025-05-23 DIAGNOSIS — D63.8 ANEMIA IN OTHER CHRONIC DISEASES CLASSIFIED ELSEWHERE: ICD-10-CM

## 2025-05-23 LAB
ALBUMIN SERPL-MCNC: 2.9 G/DL (ref 3.5–5.2)
ALBUMIN/GLOB SERPL: 0.7 G/DL
ALP SERPL-CCNC: 96 U/L (ref 39–117)
ALT SERPL W P-5'-P-CCNC: 10 U/L (ref 1–41)
ANION GAP SERPL CALCULATED.3IONS-SCNC: 8 MMOL/L (ref 5–15)
AORTIC DIMENSIONLESS INDEX: 0.38 (DI)
ASCENDING AORTA: 3.6 CM
AST SERPL-CCNC: 13 U/L (ref 1–40)
AV MEAN PRESS GRAD SYS DOP V1V2: 21.9 MMHG
AV VMAX SYS DOP: 332.9 CM/SEC
BACTERIA SPEC AEROBE CULT: NO GROWTH
BASOPHILS # BLD AUTO: 0.03 10*3/MM3 (ref 0–0.2)
BASOPHILS NFR BLD AUTO: 0.5 % (ref 0–1.5)
BH BB BLOOD EXPIRATION DATE: NORMAL
BH BB BLOOD EXPIRATION DATE: NORMAL
BH BB BLOOD TYPE BARCODE: 5100
BH BB BLOOD TYPE BARCODE: 5100
BH BB DISPENSE STATUS: NORMAL
BH BB DISPENSE STATUS: NORMAL
BH BB PRODUCT CODE: NORMAL
BH BB PRODUCT CODE: NORMAL
BH BB UNIT NUMBER: NORMAL
BH BB UNIT NUMBER: NORMAL
BH CV ECHO MEAS - ACS: 1.34 CM
BH CV ECHO MEAS - AO MAX PG: 44.3 MMHG
BH CV ECHO MEAS - AO ROOT AREA (BSA CORRECTED): 1.5 CM2
BH CV ECHO MEAS - AO ROOT DIAM: 3.6 CM
BH CV ECHO MEAS - AO V2 VTI: 74.7 CM
BH CV ECHO MEAS - AVA(I,D): 1.33 CM2
BH CV ECHO MEAS - EDV(CUBED): 185.9 ML
BH CV ECHO MEAS - EDV(MOD-SP2): 199 ML
BH CV ECHO MEAS - EDV(MOD-SP4): 193 ML
BH CV ECHO MEAS - EF(MOD-SP2): 54.3 %
BH CV ECHO MEAS - EF(MOD-SP4): 63.7 %
BH CV ECHO MEAS - ESV(CUBED): 62.3 ML
BH CV ECHO MEAS - ESV(MOD-SP2): 91 ML
BH CV ECHO MEAS - ESV(MOD-SP4): 70 ML
BH CV ECHO MEAS - FS: 30.5 %
BH CV ECHO MEAS - IVS/LVPW: 1.03 CM
BH CV ECHO MEAS - IVSD: 1.3 CM
BH CV ECHO MEAS - LAT PEAK E' VEL: 7.3 CM/SEC
BH CV ECHO MEAS - LV DIASTOLIC VOL/BSA (35-75): 78.4 CM2
BH CV ECHO MEAS - LV MASS(C)D: 316.4 GRAMS
BH CV ECHO MEAS - LV MAX PG: 5.2 MMHG
BH CV ECHO MEAS - LV MEAN PG: 2.47 MMHG
BH CV ECHO MEAS - LV SYSTOLIC VOL/BSA (12-30): 28.4 CM2
BH CV ECHO MEAS - LV V1 MAX: 113.9 CM/SEC
BH CV ECHO MEAS - LV V1 VTI: 28.5 CM
BH CV ECHO MEAS - LVIDD: 5.7 CM
BH CV ECHO MEAS - LVIDS: 4 CM
BH CV ECHO MEAS - LVOT AREA: 3.5 CM2
BH CV ECHO MEAS - LVOT DIAM: 2.1 CM
BH CV ECHO MEAS - LVPWD: 1.26 CM
BH CV ECHO MEAS - MED PEAK E' VEL: 5.9 CM/SEC
BH CV ECHO MEAS - MV A DUR: 0.13 SEC
BH CV ECHO MEAS - MV A MAX VEL: 101.3 CM/SEC
BH CV ECHO MEAS - MV DEC SLOPE: 757.7 CM/SEC2
BH CV ECHO MEAS - MV DEC TIME: 0.19 SEC
BH CV ECHO MEAS - MV E MAX VEL: 158 CM/SEC
BH CV ECHO MEAS - MV E/A: 1.56
BH CV ECHO MEAS - MV MAX PG: 11.8 MMHG
BH CV ECHO MEAS - MV MEAN PG: 3.7 MMHG
BH CV ECHO MEAS - MV P1/2T: 71.3 MSEC
BH CV ECHO MEAS - MV V2 VTI: 46.3 CM
BH CV ECHO MEAS - MVA(P1/2T): 3.1 CM2
BH CV ECHO MEAS - MVA(VTI): 2.14 CM2
BH CV ECHO MEAS - PA ACC TIME: 0.14 SEC
BH CV ECHO MEAS - PA V2 MAX: 99.8 CM/SEC
BH CV ECHO MEAS - PULM A REVS DUR: 0.14 SEC
BH CV ECHO MEAS - PULM A REVS VEL: 20.3 CM/SEC
BH CV ECHO MEAS - PULM DIAS VEL: 56.7 CM/SEC
BH CV ECHO MEAS - PULM S/D: 0.72
BH CV ECHO MEAS - PULM SYS VEL: 41.1 CM/SEC
BH CV ECHO MEAS - RAP SYSTOLE: 8 MMHG
BH CV ECHO MEAS - RV MAX PG: 2.44 MMHG
BH CV ECHO MEAS - RV V1 MAX: 78.1 CM/SEC
BH CV ECHO MEAS - RV V1 VTI: 17.3 CM
BH CV ECHO MEAS - RVSP: 59 MMHG
BH CV ECHO MEAS - SV(LVOT): 99.1 ML
BH CV ECHO MEAS - SV(MOD-SP2): 108 ML
BH CV ECHO MEAS - SV(MOD-SP4): 123 ML
BH CV ECHO MEAS - SVI(LVOT): 40.3 ML/M2
BH CV ECHO MEAS - SVI(MOD-SP2): 43.9 ML/M2
BH CV ECHO MEAS - SVI(MOD-SP4): 50 ML/M2
BH CV ECHO MEAS - TAPSE (>1.6): 1.63 CM
BH CV ECHO MEAS - TR MAX PG: 51.3 MMHG
BH CV ECHO MEAS - TR MAX VEL: 358.3 CM/SEC
BH CV ECHO MEASUREMENTS AVERAGE E/E' RATIO: 23.94
BH CV XLRA - RV BASE: 4.1 CM
BH CV XLRA - RV LENGTH: 9.4 CM
BH CV XLRA - TDI S': 9.1 CM/SEC
BILIRUB SERPL-MCNC: 0.4 MG/DL (ref 0–1.2)
BUN SERPL-MCNC: 21 MG/DL (ref 8–23)
BUN/CREAT SERPL: 8 (ref 7–25)
C AURIS DNA SPEC QL NAA+NON-PROBE: NOT DETECTED
CALCIUM SPEC-SCNC: 8.3 MG/DL (ref 8.6–10.5)
CHLORIDE SERPL-SCNC: 98 MMOL/L (ref 98–107)
CO2 SERPL-SCNC: 28 MMOL/L (ref 22–29)
CREAT SERPL-MCNC: 2.63 MG/DL (ref 0.76–1.27)
CROSSMATCH INTERPRETATION: NORMAL
CROSSMATCH INTERPRETATION: NORMAL
DEPRECATED RDW RBC AUTO: 52.9 FL (ref 37–54)
EGFRCR SERPLBLD CKD-EPI 2021: 23.8 ML/MIN/1.73
EOSINOPHIL # BLD AUTO: 0.26 10*3/MM3 (ref 0–0.4)
EOSINOPHIL NFR BLD AUTO: 4.3 % (ref 0.3–6.2)
ERYTHROCYTE [DISTWIDTH] IN BLOOD BY AUTOMATED COUNT: 15.8 % (ref 12.3–15.4)
FOLATE BLD-MCNC: 333 NG/ML
FOLATE RBC-MCNC: 1473 NG/ML
GLOBULIN UR ELPH-MCNC: 4.2 GM/DL
GLUCOSE BLDC GLUCOMTR-MCNC: 104 MG/DL (ref 70–130)
GLUCOSE BLDC GLUCOMTR-MCNC: 107 MG/DL (ref 70–130)
GLUCOSE BLDC GLUCOMTR-MCNC: 156 MG/DL (ref 70–130)
GLUCOSE BLDC GLUCOMTR-MCNC: 85 MG/DL (ref 70–130)
GLUCOSE SERPL-MCNC: 109 MG/DL (ref 65–99)
HCT VFR BLD AUTO: 22.6 % (ref 37.5–51)
HCT VFR BLD AUTO: 28.1 % (ref 37.5–51)
HGB BLD-MCNC: 9.1 G/DL (ref 13–17.7)
IMM GRANULOCYTES # BLD AUTO: 0.04 10*3/MM3 (ref 0–0.05)
IMM GRANULOCYTES NFR BLD AUTO: 0.7 % (ref 0–0.5)
LDH SERPL-CCNC: 188 U/L (ref 135–225)
LEFT ATRIUM VOLUME INDEX: 45.5 ML/M2
LV EF BIPLANE MOD: 59.7 %
LYMPHOCYTES # BLD AUTO: 0.59 10*3/MM3 (ref 0.7–3.1)
LYMPHOCYTES NFR BLD AUTO: 9.8 % (ref 19.6–45.3)
MAGNESIUM SERPL-MCNC: 2 MG/DL (ref 1.6–2.4)
MCH RBC QN AUTO: 29.7 PG (ref 26.6–33)
MCHC RBC AUTO-ENTMCNC: 32.4 G/DL (ref 31.5–35.7)
MCV RBC AUTO: 91.8 FL (ref 79–97)
MONOCYTES # BLD AUTO: 0.41 10*3/MM3 (ref 0.1–0.9)
MONOCYTES NFR BLD AUTO: 6.8 % (ref 5–12)
NEUTROPHILS NFR BLD AUTO: 4.71 10*3/MM3 (ref 1.7–7)
NEUTROPHILS NFR BLD AUTO: 77.9 % (ref 42.7–76)
NRBC BLD AUTO-RTO: 0 /100 WBC (ref 0–0.2)
PHOSPHATE SERPL-MCNC: 3.7 MG/DL (ref 2.5–4.5)
PLATELET # BLD AUTO: 222 10*3/MM3 (ref 140–450)
PMV BLD AUTO: 8.9 FL (ref 6–12)
POTASSIUM SERPL-SCNC: 4.2 MMOL/L (ref 3.5–5.2)
PROT SERPL-MCNC: 7.1 G/DL (ref 6–8.5)
RBC # BLD AUTO: 3.06 10*6/MM3 (ref 4.14–5.8)
SINUS: 3.2 CM
SODIUM SERPL-SCNC: 134 MMOL/L (ref 136–145)
STJ: 3 CM
UNIT  ABO: NORMAL
UNIT  ABO: NORMAL
UNIT  RH: NORMAL
UNIT  RH: NORMAL
WBC NRBC COR # BLD AUTO: 6.04 10*3/MM3 (ref 3.4–10.8)

## 2025-05-23 PROCEDURE — 93010 ELECTROCARDIOGRAM REPORT: CPT | Performed by: INTERNAL MEDICINE

## 2025-05-23 PROCEDURE — 99232 SBSQ HOSP IP/OBS MODERATE 35: CPT | Performed by: INTERNAL MEDICINE

## 2025-05-23 PROCEDURE — 84100 ASSAY OF PHOSPHORUS: CPT

## 2025-05-23 PROCEDURE — 97162 PT EVAL MOD COMPLEX 30 MIN: CPT

## 2025-05-23 PROCEDURE — 25010000002 EPOETIN ALFA-EPBX 10000 UNIT/ML SOLUTION

## 2025-05-23 PROCEDURE — 25510000001 PERFLUTREN 6.52 MG/ML SUSPENSION 2 ML VIAL: Performed by: INTERNAL MEDICINE

## 2025-05-23 PROCEDURE — 80053 COMPREHEN METABOLIC PANEL: CPT | Performed by: INTERNAL MEDICINE

## 2025-05-23 PROCEDURE — 97166 OT EVAL MOD COMPLEX 45 MIN: CPT

## 2025-05-23 PROCEDURE — 36415 COLL VENOUS BLD VENIPUNCTURE: CPT | Performed by: INTERNAL MEDICINE

## 2025-05-23 PROCEDURE — 93005 ELECTROCARDIOGRAM TRACING: CPT | Performed by: INTERNAL MEDICINE

## 2025-05-23 PROCEDURE — 97535 SELF CARE MNGMENT TRAINING: CPT

## 2025-05-23 PROCEDURE — 93306 TTE W/DOPPLER COMPLETE: CPT

## 2025-05-23 PROCEDURE — 97530 THERAPEUTIC ACTIVITIES: CPT

## 2025-05-23 PROCEDURE — 63710000001 INSULIN LISPRO (HUMAN) PER 5 UNITS: Performed by: INTERNAL MEDICINE

## 2025-05-23 PROCEDURE — 83615 LACTATE (LD) (LDH) ENZYME: CPT | Performed by: INTERNAL MEDICINE

## 2025-05-23 PROCEDURE — 83735 ASSAY OF MAGNESIUM: CPT

## 2025-05-23 PROCEDURE — 82948 REAGENT STRIP/BLOOD GLUCOSE: CPT

## 2025-05-23 PROCEDURE — 93306 TTE W/DOPPLER COMPLETE: CPT | Performed by: INTERNAL MEDICINE

## 2025-05-23 PROCEDURE — 85025 COMPLETE CBC W/AUTO DIFF WBC: CPT | Performed by: INTERNAL MEDICINE

## 2025-05-23 RX ORDER — ECHINACEA PURPUREA EXTRACT 125 MG
1 TABLET ORAL
Status: DISCONTINUED | OUTPATIENT
Start: 2025-05-23 | End: 2025-06-04 | Stop reason: HOSPADM

## 2025-05-23 RX ORDER — TAMSULOSIN HYDROCHLORIDE 0.4 MG/1
0.4 CAPSULE ORAL DAILY
Status: DISCONTINUED | OUTPATIENT
Start: 2025-05-23 | End: 2025-06-04 | Stop reason: HOSPADM

## 2025-05-23 RX ADMIN — PANTOPRAZOLE SODIUM 40 MG: 40 INJECTION, POWDER, FOR SOLUTION INTRAVENOUS at 17:30

## 2025-05-23 RX ADMIN — DAKIN'S SOLUTION 0.125% (QUARTER STRENGTH): 0.12 SOLUTION at 22:10

## 2025-05-23 RX ADMIN — METOPROLOL SUCCINATE 25 MG: 25 TABLET, EXTENDED RELEASE ORAL at 09:17

## 2025-05-23 RX ADMIN — HYDROXYZINE HYDROCHLORIDE 25 MG: 25 TABLET, FILM COATED ORAL at 09:17

## 2025-05-23 RX ADMIN — BUMETANIDE 2 MG: 1 TABLET ORAL at 05:48

## 2025-05-23 RX ADMIN — PERFLUTREN 3 ML: 6.52 INJECTION, SUSPENSION INTRAVENOUS at 11:22

## 2025-05-23 RX ADMIN — HYDRALAZINE HYDROCHLORIDE 10 MG: 10 TABLET ORAL at 17:30

## 2025-05-23 RX ADMIN — MENTHOL, ZINC OXIDE 1 APPLICATION: .44; 20.6 OINTMENT TOPICAL at 09:20

## 2025-05-23 RX ADMIN — PANTOPRAZOLE SODIUM 40 MG: 40 INJECTION, POWDER, FOR SOLUTION INTRAVENOUS at 09:16

## 2025-05-23 RX ADMIN — DAKIN'S SOLUTION 0.125% (QUARTER STRENGTH): 0.12 SOLUTION at 09:20

## 2025-05-23 RX ADMIN — AMIODARONE HYDROCHLORIDE 200 MG: 200 TABLET ORAL at 09:17

## 2025-05-23 RX ADMIN — ANTI-FUNGAL POWDER MICONAZOLE NITRATE TALC FREE 1 APPLICATION: 1.42 POWDER TOPICAL at 09:19

## 2025-05-23 RX ADMIN — TAMSULOSIN HYDROCHLORIDE 0.4 MG: 0.4 CAPSULE ORAL at 09:17

## 2025-05-23 RX ADMIN — Medication 10 ML: at 09:20

## 2025-05-23 RX ADMIN — Medication 10 ML: at 22:11

## 2025-05-23 RX ADMIN — MENTHOL, ZINC OXIDE 1 APPLICATION: .44; 20.6 OINTMENT TOPICAL at 21:02

## 2025-05-23 RX ADMIN — HYDROXYZINE HYDROCHLORIDE 25 MG: 25 TABLET, FILM COATED ORAL at 22:10

## 2025-05-23 RX ADMIN — HYDRALAZINE HYDROCHLORIDE 10 MG: 10 TABLET ORAL at 21:01

## 2025-05-23 RX ADMIN — OXYCODONE AND ACETAMINOPHEN 1 TABLET: 5; 325 TABLET ORAL at 04:03

## 2025-05-23 RX ADMIN — BUMETANIDE 2 MG: 1 TABLET ORAL at 17:29

## 2025-05-23 RX ADMIN — ATORVASTATIN CALCIUM 20 MG: 20 TABLET, FILM COATED ORAL at 21:01

## 2025-05-23 RX ADMIN — SEVELAMER CARBONATE 800 MG: 800 TABLET, FILM COATED ORAL at 17:30

## 2025-05-23 RX ADMIN — CAMPHOR, MENTHOL: .5; .5 LOTION TOPICAL at 17:31

## 2025-05-23 RX ADMIN — INSULIN LISPRO 2 UNITS: 100 INJECTION, SOLUTION INTRAVENOUS; SUBCUTANEOUS at 17:30

## 2025-05-23 RX ADMIN — Medication: at 09:18

## 2025-05-23 RX ADMIN — Medication 10 ML: at 21:14

## 2025-05-23 RX ADMIN — ANTI-FUNGAL POWDER MICONAZOLE NITRATE TALC FREE 1 APPLICATION: 1.42 POWDER TOPICAL at 21:01

## 2025-05-23 RX ADMIN — CLOBETASOL PROPIONATE CREAM USP, 0.05% 1 APPLICATION: 0.5 CREAM TOPICAL at 09:17

## 2025-05-23 RX ADMIN — OXYCODONE AND ACETAMINOPHEN 1 TABLET: 5; 325 TABLET ORAL at 10:55

## 2025-05-23 RX ADMIN — OXYCODONE AND ACETAMINOPHEN 1 TABLET: 5; 325 TABLET ORAL at 18:43

## 2025-05-23 RX ADMIN — SEVELAMER CARBONATE 800 MG: 800 TABLET, FILM COATED ORAL at 13:39

## 2025-05-23 RX ADMIN — CLOBETASOL PROPIONATE CREAM USP, 0.05% 1 APPLICATION: 0.5 CREAM TOPICAL at 21:09

## 2025-05-23 RX ADMIN — Medication: at 21:01

## 2025-05-23 RX ADMIN — SEVELAMER CARBONATE 800 MG: 800 TABLET, FILM COATED ORAL at 09:16

## 2025-05-23 RX ADMIN — BUMETANIDE 2 MG: 1 TABLET ORAL at 13:39

## 2025-05-23 RX ADMIN — SALINE NASAL SPRAY 1 SPRAY: 1.5 SOLUTION NASAL at 17:31

## 2025-05-23 RX ADMIN — SALINE NASAL SPRAY 1 SPRAY: 1.5 SOLUTION NASAL at 21:10

## 2025-05-23 RX ADMIN — OXYCODONE HYDROCHLORIDE AND ACETAMINOPHEN 250 MG: 500 TABLET ORAL at 09:17

## 2025-05-23 RX ADMIN — EPOETIN ALFA-EPBX 10000 UNITS: 10000 INJECTION, SOLUTION INTRAVENOUS; SUBCUTANEOUS at 09:18

## 2025-05-23 RX ADMIN — HYDRALAZINE HYDROCHLORIDE 10 MG: 10 TABLET ORAL at 09:16

## 2025-05-23 NOTE — PLAN OF CARE
Goal Outcome Evaluation:   Patient alert follows commands 3 way abdi cbi monitored intake and output. Prn pain meds given. No nausea noted. H/d completed at shift change. Npo for possible porcedure in am no ordered at thois time. No acute distress noted wound care and skin care provided prn atarax given for itching. Will continue to monitor

## 2025-05-23 NOTE — PROGRESS NOTES
Patient Name: Rock Maciel Jr.  : 1945  MRN: [id number]  Primary Care Physician: Denise Dickson APRN  Date of admission: 2025    Patient Care Team:  Denise Dickson APRN as PCP - General (Nurse Practitioner)  Azam Banegas Jr., MD as Consulting Physician (Cardiology)  Jamil Stevens MD as Consulting Physician (Nephrology)        Reason for Consult:       KILLIAN/CKD-->HD 5 days/week      Subjective:     Seen and examined, no distress  HD completed  with 2.5L UF, 1 unit pRBCs given  Urinary retention. Johnson to remain in place on d/c per Urology    Review of systems:    Constitutional:  weakness.  HEENT:  No headache, otalgia, itchy eyes, nasal discharge or sore throat.  Cardiac:  No chest pain, dyspnea, orthopnea or PND.  Chest:              No cough, phlegm or wheezing.  Abdomen:  abdominal pain  Neuro:  No focal weakness, abnormal movements or seizure-like activity.  :   Decreased UOP, hematuria  ROS was otherwise negative except as mentioned in the [place].       Personal History:     Past Medical History:   Past Medical History:   Diagnosis Date    Acquired absence of left foot 2022    Acute osteomyelitis of right foot 2025    Allergic rhinitis     Amputated toe of left foot 2021    Amputated toe of right foot 2019    ATN (acute tubular necrosis) 2025    Atrial fibrillation, persistent 10/18/2024    Bladder wall thickening 2024    Bronchitis     Carotid stenosis, asymptomatic 2017    Charcot-Davida-Tooth disease-like deformity of foot 2019    Chronic heart failure with preserved ejection fraction (HFpEF) 2025    CKD (chronic kidney disease) stage 4, GFR 15-29 ml/min 2024    Constipation 2019    Coronary artery disease     Diabetes mellitus 2001    Diabetic foot infection 2025    DM (diabetes mellitus)     Encounter for annual health examination 2014    Annual Health Assessment    Gas gangrene  10/25/2021    Formatting of this note might be different from the original.  Added automatically from request for surgery [id number]      Gout     H/O aortic valve replacement with porcine valve 09/10/2018    Hiatal hernia     History of MRSA infection     RIGHT FOOT 2010    Hyperlipidemia     Hypertension     LAFB (left anterior fascicular block) 05/17/2016    MRSA (methicillin resistant Staphylococcus aureus) infection 03/08/2025    Murmur     Neuropathic arthropathy 05/17/2016    Overview:   2015 IMO UPDATE  Overview:   2015 IMO UPDATE      Nocturnal hypoxemia 12/01/2024    Nonrheumatic aortic valve stenosis 01/30/2017    Persistent atrial fibrillation 11/07/2024    Pneumothorax on right     S/P CABG x 2 05/06/2019 July 2018      Sleep apnea     pt wears CPAP at night    Status post left inguinal hernia repair, follow-up exam 01/05/2025    Traumatic rhabdomyolysis 03/03/2025    Type 2 diabetes mellitus with circulatory disorder, without long-term current use of insulin 05/17/2016    Unsteady gait when walking 01/05/2025    Wellness examination 06/24/2015    Annual Wellness Visit       Surgical History:      Past Surgical History:   Procedure Laterality Date    ARTERY SURGERY Bilateral     carotid    BONE EXCISION LEG Right 3/10/2025    Procedure: BONE EXCISION LOWER EXTERMITY, RIGHT;  Surgeon: Jimenez Mchugh DPM;  Location: [facility] MAIN OR;  Service: Podiatry;  Laterality: Right;    CARDIAC CATHETERIZATION N/A 7/20/2018    Procedure: Left Heart Cath;  Surgeon: Jo Toney MD;  Location:  [facility] CATH INVASIVE LOCATION;  Service: Cardiovascular    CARDIAC CATHETERIZATION N/A 7/20/2018    Procedure: Coronary angiography;  Surgeon: Jo Toney MD;  Location:  [facility]ATION;  Service: Cardiovascular    CAROTID ENDARTERECTOMY Bilateral     COLONOSCOPY  2008    CORONARY ARTERY BYPASS GRAFT WITH AORTIC VALVE REPAIR/REPLACEMENT N/A 7/23/2018    Procedure: INTRAOPERATIVE ALEX, MIDLINE  STERNOTOMY, CORONARY ARTERY BYPASS GRAFTING X 3 USING ENDOSCOPICALLY HARVESTED LEFT GREATER SAPHENOUS VEIN,  AORTIC VALVE REPLACEMENT USING 25MM LOPEZ II ULTRA PORCINE VALVE, PRP;  Surgeon: Phong Posey MD;  Location: [facility] OR;  Service: Cardiothoracic    FOOT SURGERY Right 2010    5th digit removal    FOOT SURGERY Left 2011    1 digit removed    INCISION AND DRAINAGE LEG Right 3/28/2020    Procedure: DEBRIDMENT OF RIGHT CALF;  Surgeon: Ross Pineda MD;  Location: [facility] MAIN OR;  Service: Vascular;  Laterality: Right;    INCISION AND DRAINAGE LEG Right 3/10/2025    Procedure: INCISION AND DRAINAGE LOWER EXTREMITY;  Surgeon: Jimenez Mchugh DPM;  Location: [facility] OR;  Service: Podiatry;  Laterality: Right;    INGUINAL HERNIA REPAIR Left 11/29/2024    Procedure: INGUINAL HERNIA REPAIR LAPAROSCOPIC WITH DAVINCI ROBOT;  Surgeon: Phong Lazo MD;  Location: [facility] OR;  Service: Robotics - DaVinci;  Laterality: Left;    INSERTION HEMODIALYSIS CATHETER N/A 3/11/2025    Procedure: HEMODIALYSIS CATHETER INSERTION;  Surgeon: Jeaneth Bonds MD;  Location: [facility] OR;  Service: Vascular;  Laterality: N/A;    QUADRICEPS TENDON REPAIR Left 5/14/2019    Procedure: Left QUADRICEPS TENDON REPAIR;  Surgeon: Camilo Hunter MD;  Location: [facility] OR;  Service: Orthopedics    THORACENTESIS Right 11/21/2016    THORACOSCOPY Right 5/8/2017    Procedure: BRONCHOSCOPY, RIGHT VAT,  TOTAL DECORTICATION RIGHT LUNG, PLEURAL BX, PLACEMENT SUBPLEURAL PAIN CAATHETERS X2;  Surgeon: Donald Orlando III, MD;  Location: [facility] OR;  Service:     TONSILECTOMY, ADENOIDECTOMY, BILATERAL MYRINGOTOMY AND TUBES      WOUND DEBRIDEMENT Right 3/13/2025    Procedure: DEBRIDEMENT FOOT, RIGHT;  Surgeon: Jimenez Mchugh DPM;  Location: [facility] OR;  Service: Podiatry;  Laterality: Right;       Family History: family history includes Hypertension in his father. Otherwise pertinent FHx was  reviewed and unremarkable.     Social History:  reports that he has never smoked. He has never been exposed to tobacco smoke. He has never used smokeless tobacco. He reports that he does not currently use alcohol after a past usage of about 7.0 standard drinks of alcohol per week. He reports that he does not use drugs.    Medications:  Prior to Admission medications    Medication Sig Start Date End Date Taking? Authorizing Provider   amiodarone (PACERONE) 200 MG tablet Take 1 tablet by mouth Daily. 1/9/25  Yes Jo Toney MD   apixaban (ELIQUIS) 2.5 MG tablet tablet Take 1 tablet by mouth 2 (Two) Times a Day. Indications: Atrial Fibrillation 11/15/24  Yes Apurva Smith APRN   atorvastatin (LIPITOR) 20 MG tablet Take 1 tablet by mouth Every Night. NEED TO SEE PROVIDER FOR FUTURE REFILLS. 2/7/25  Yes Denise Dickson APRN   bisacodyl (DULCOLAX) 10 MG suppository Insert 1 suppository into the rectum Daily As Needed for Constipation.   Yes ProviderShivam MD   bumetanide (BUMEX) 2 MG tablet Take 1 tablet by mouth 3 (Three) Times a Day. 3/19/25  Yes Magdalena Diamond MD   clopidogrel (PLAVIX) 75 MG tablet Take 1 tablet by mouth Daily. 10/16/24  Yes Denise Dickson APRN   diphenhydrAMINE (BENADRYL) 25 mg capsule Take 1 capsule by mouth 3 (Three) Times a Day As Needed for Itching.   Yes ProviderShivam MD   glipizide (GLUCOTROL) 5 MG tablet Take 0.5 tablets by mouth 2 (Two) Times a Day Before Meals. Indications: Type 2 Diabetes 3/19/25  Yes Magdalena Diamond MD   glucose blood (Accu-Chek Keshia Plus) test strip USE TO TEST BLOOD SUGAR    TWICE DAILY FOR DIABETES 7/29/24  Yes Denise Dickson APRN   hydrALAZINE (APRESOLINE) 10 MG tablet Take 1 tablet by mouth 3 (Three) Times a Day. 3/19/25  Yes Magdalena Diamond MD   insulin lispro (HUMALOG/ADMELOG) 100 UNIT/ML injection Inject 2-9 Units under the skin into the appropriate area as directed 4 (Four) Times a Day Before Meals & at  Bedtime. 3/19/25  Yes Magdalena Diamond MD   lidocaine (LIDODERM) 5 % Place 1 patch on the skin as directed by provider Daily. Remove & Discard patch within 12 hours or as directed by MD  apply to left shoulder   Yes Shivam Smith MD   linagliptin (Tradjenta) 5 MG tablet tablet Take 1 tablet by mouth Daily. 1/9/25  Yes Denise Dickson APRN   melatonin 5 MG tablet tablet Take 1 tablet by mouth Every Night. 3/19/25  Yes Magdalena Diamond MD   metoprolol succinate XL (TOPROL-XL) 25 MG 24 hr tablet Take 1 tablet by mouth Daily. 11/15/24  Yes Apurva Smith APRN   nitroglycerin (NITROSTAT) 0.4 MG SL tablet Place 1 tablet under the tongue Every 5 (Five) Minutes As Needed for Chest Pain (Only if SBP Greater Than 100). Take no more than 3 doses in 15 minutes. 11/15/24  Yes Apurva Smith APRN   ondansetron ODT (ZOFRAN-ODT) 4 MG disintegrating tablet Take 1 tablet by mouth Every 6 (Six) Hours As Needed for Nausea or Vomiting. 3/19/25  Yes Magdalena Diamond MD   polyethylene glycol (MIRALAX) 17 g packet Take 17 g by mouth Daily.   Yes ProviderShivam MD   senna 8.6 MG tablet Take 1 tablet by mouth Daily.   Yes ProviderShivam MD   sevelamer (RENVELA) 800 MG tablet Take 1 tablet by mouth 3 (Three) Times a Day With Meals.  Patient taking differently: Take 1 tablet by mouth Daily With Lunch. 3/19/25  Yes Magdalena Diamond MD   terazosin (HYTRIN) 2 MG capsule Take 1 capsule by mouth Every Night. 12/27/24  Yes Denise Dickson APRN   traZODone (DESYREL) 50 MG tablet Take 1.5 tablets by mouth Every Night.   Yes ProviderShivam MD   vitamin C (ASCORBIC ACID) 250 MG tablet Take 1 tablet by mouth Daily.   Yes Shivam Smith MD   Zinc 50 MG tablet  2/19/22  Yes Shivam Smith MD   acetaminophen (TYLENOL) 325 MG tablet Take 2 tablets by mouth Every 6 (Six) Hours As Needed for Mild Pain. 11/30/24   Bishop Chakraborty MD   Cholecalciferol 25 MCG (1000 UT) tablet  Take 1 tablet by mouth Every 7 (Seven) Days.    Provider, MD Shivam     Scheduled Meds:amiodarone, 200 mg, Oral, Daily  [Held by provider] apixaban, 2.5 mg, Oral, BID  vitamin C, 250 mg, Oral, Daily  atorvastatin, 20 mg, Oral, Nightly  bumetanide, 2 mg, Oral, TID  cetaphil, , Topical, Q12H  clobetasol propionate, 1 Application, Topical, Q12H  [Held by provider] clopidogrel, 75 mg, Oral, Daily  epoetin vince/vince-epbx, 10,000 Units, Subcutaneous, Once per day on Monday Wednesday Friday  [Held by provider] glipizide, 2.5 mg, Oral, BID AC  hydrALAZINE, 10 mg, Oral, TID  insulin lispro, 2-7 Units, Subcutaneous, 4x Daily AC & at Bedtime  [Held by provider] linagliptin, 5 mg, Oral, Daily  Menthol-Zinc Oxide, 1 Application, Topical, BID  metoprolol succinate XL, 25 mg, Oral, Daily  miconazole, 1 Application, Topical, Q12H  pantoprazole, 40 mg, Intravenous, BID AC  sevelamer, 800 mg, Oral, TID With Meals  sodium chloride, 10 mL, Intravenous, Q12H  sodium chloride, 10 mL, Intravenous, Q12H  sodium chloride, 10 mL, Intravenous, Q12H  sodium hypochlorite, , Topical, BID  tamsulosin, 0.4 mg, Oral, Daily      Continuous Infusions:   PRN Meds:  acetaminophen **OR** acetaminophen **OR** acetaminophen    senna-docusate sodium **AND** polyethylene glycol **AND** bisacodyl **AND** bisacodyl    calcium carbonate    camphor-menthol    dextrose    dextrose    glucagon (human recombinant)    heparin    hydrOXYzine    lidocaine    melatonin    nitroglycerin    ondansetron ODT **OR** ondansetron    oxybutynin    oxyCODONE-acetaminophen    sodium chloride    sodium chloride    sodium chloride    sodium chloride    sodium chloride    sodium chloride  Allergies:    Allergies   Allergen Reactions    Ceclor [Cefaclor] Rash     Rash in his 50s; he tolerated piperacillin-tazobactam in March 2020       Objective   Exam:     Vital Signs  Temp:  [97.5 °F (36.4 °C)-98.2 °F (36.8 °C)] 98.2 °F (36.8 °C)  Heart Rate:  [59-73] 73  Resp:  [16-18]  18  BP: (124-169)/(44-69) 169/57  SpO2:  [96 %-100 %] 97 %  on  Flow (L/min) (Oxygen Therapy):  [2] 2;   Device (Oxygen Therapy): nasal cannula  Body mass index is 38.11 kg/m².  EXAM  General:  Chronically ill appearing, in no acute distress.    Head:      Normocephalic and atraumatic.    Eyes:      PERRL/EOM intact, conjunctivae and sclerae clear without nystagmus.    Neck:      No masses, thyromegaly,  trachea central   Lungs:    Clear bilaterally to auscultation.    Heart:      Regular rate and rhythm, no murmur no gallop  Abd:        Soft, nontender, not distended, bowel sounds positive, no shifting dullness.  Msk:        No deformity or scoliosis noted of thoracic or lumbar spine.    Pulses:   Pulses normal in all 4 extremities.    Extremities:    Wound vac right foot  Neuro:    No focal deficits.   alert oriented x3  Skin:       Intact without lesions or rashes.    Psych:    Alert and cooperative; normal mood and affect; normal attention span     Access: [facility]    Results Review:  I have personally reviewed most recent Data :  BMP @LABNT(creatinine:10)  CBC    Results from last 7 days   Lab Units 05/23/25  0533 05/22/25  1604 05/22/25  0535 05/21/25  2157 05/21/25  1303   WBC 10*3/mm3 6.04  --  5.56  --  5.94   HEMOGLOBIN g/dL 9.1* 7.6* 6.6* 7.1* 6.6*   PLATELETS 10*3/mm3 222  --  223  --  294     CMP   Results from last 7 days   Lab Units 05/23/25  0533 05/22/25  0535 05/21/25  1303   SODIUM mmol/L 134* 135* 134*   POTASSIUM mmol/L 4.2 3.5 3.9   CHLORIDE mmol/L 98 96* 94*   CO2 mmol/L 28.0 30.0* 32.0*   BUN mg/dL 21 30* 27*   CREATININE mg/dL 2.63* 3.60* 2.89*   GLUCOSE mg/dL 109* 50* 110*   ALBUMIN g/dL 2.9* 2.6* 2.9*   BILIRUBIN mg/dL 0.4 0.4 0.3   ALK PHOS U/L 96 77 92   AST (SGOT) U/L 13 13 12   ALT (SGPT) U/L 10 9 9     ABG      XR Shoulder 2+ View Left  Result Date: 5/16/2025  No new fractures are identified. Technically limited exam.  This report was finalized on 5/16/2025 1:53 AM by Dr. Pro  AJ Cruz on Workstation: [id number]        Results for orders placed during the hospital encounter of 05/21/25    Adult Transthoracic Echo Complete W/ Cont if Necessary Per Protocol    Interpretation Summary    Left ventricular ejection fraction appears to be 56 - 60%.    Left ventricular diastolic function was normal.    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 Mild transvalvular regurgitation is present in the prosthetic aortic valve.    Estimated right ventricular systolic pressure from tricuspid regurgitation is markedly elevated (>55 mmHg).        Assessment & Plan   Assessment and Plan:         Anemia    Essential hypertension    Type 2 diabetes mellitus with circulatory disorder, without long-term current use of insulin    SHASTA (obstructive sleep apnea)    H/O aortic valve replacement with porcine valve    Amputated toe of right foot    S/P CABG x 2    Amputated toe of left foot    Atrial fibrillation, persistent    Persistent atrial fibrillation    CKD (chronic kidney disease) stage 4, GFR 15-29 ml/min    Nocturnal hypoxemia    Unsteady gait when walking    Chronic heart failure with preserved ejection fraction (HFpEF)    GI bleed    Peritoneal dialysis status    Hypoalbuminemia    Moderate protein-calorie malnutrition    ASSESSMENT:  CKD with hospitalization in March 2025 requiring HD due to KILLIAN likely ATN 2/2 rhabdomyolysis, prerenal insult related to diuretics. OM, now on next stage HD 5 days per week (MTWThF) with RIJ TDC  Acute TYLER, hematuria  Urinary retention  A-fib with controlled rates.  History of HFpEF with pulmonary hypertension  Recent osteomyelitis and abscess right foot  T2DM  HTN  A-fib  CAD  PVD     TTE 5/23/25 with EF 56-60%, mild transvalvular regurgitation in prosthetic AV, RVSP >55 mmHg    PLAN :     Continue TTS schedule while inpatient, then resume next stage 5 day/week HD when discharged. HD ordered for 5/24  Chemistry largely stable  Received 1 unit pRBCs, trend H&H,  iron profile with elevated ferritin. On retacrit  Urology following for urinary retention, hematuria  Johnson placed, to remain in place on d/c per Urology  Podiatry following per right foot wound, healing well  GI following, plans for upper endoscopy when off plavix 5 days  F/u cultures  Will monitor and coordinate with team      [facility]  5/23/2025  12:26 EDT    Patient was seen earlier by  APRN. I personally have examined the patient and interviewed the patient. I have reviewed the history, data, problems, assessment and plan with the nurse practitioner during rounds and I concur with the history, exam, assessment and plan as described in the progress note with comments additions, revisions as noted.           Les Talavera MD  5/23/2025  [facility]

## 2025-05-23 NOTE — THERAPY EVALUATION
-Per MAYELA Terry, Ms. Bernal has been called with new recommendations.  Continue current medications and follow-up as planned or sooner if any problems.    She has an appointment scheduled with SEEMA Walker on 10/14/19        ---- Message from MAYELA Flores sent at 9/17/2019  6:28 AM CDT -----  Now.  I apologize.  I did not see the results from November 2018.  She just needs to make sure to follow-up with MEGHAN Hidalgo as her numbers were normal but now have elevated again.  I will cancel the U/S.      ----- Message -----  From: Janet Rey LPN  Sent: 9/16/2019   5:46 PM  To: MAYELA Flores    Per MAYELA Terry, Ms. Bernal has been called with recent lab results & recommendations.  Continue current medications and follow-up as planned or sooner if any problems.    Gretchen,  She has  Had an US of the liver back in December of 2018. It showed a fatty liver and gallstones.  Do you want her to have it repeated??  Please Advise  Thank you       Patient Name: Rock Maciel Jr.  : 1945    MRN: [id number]                              Today's Date: 2025       Admit Date: 2025    Visit Dx:     ICD-10-CM ICD-9-CM   1. Acute blood loss anemia  D62 285.1   2. Rectal bleeding  K62.5 569.3   3. ESRD on hemodialysis  N18.6 585.6    Z99.2 V45.11     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    Chronic venous insufficiency    Nonrheumatic aortic valve stenosis    Carotid stenosis, asymptomatic    Bradycardia    Hyperlipidemia    Bilateral carotid artery disease    Abnormal cardiovascular stress test    H/O aortic valve replacement with porcine valve    Charcot-Davida-Tooth disease-like deformity of foot    Amputated toe of right foot    Fall    Non-traumatic rhabdomyolysis    S/P CABG x 2    CKD (chronic kidney disease) stage 3, GFR 30-59 ml/min    Rupture of left quadriceps tendon    Constipation    KILLIAN (acute kidney injury)    Wound of right leg    Anemia    Chronic diastolic (congestive) heart failure    Amputated toe of left foot    Gas gangrene    Cellulitis of left lower limb    Acquired absence of left foot    Bilateral lower extremity edema    Stage 3b chronic kidney disease    History of pneumonia    Atelectasis of both lungs    Abnormal pleural fluid    Pleural thickening    Atrial fibrillation, persistent    Chronic anticoagulation    Persistent atrial fibrillation    Bladder wall thickening    Hematuria    CKD (chronic kidney disease) stage 4, GFR 15-29 ml/min    Hypoxemia    Nocturnal hypoxemia    Status post left inguinal hernia repair,  follow-up exam    Weakness of both lower extremities    Unsteady gait when walking    Wound, open, arm, forearm, right, subsequent encounter    Traumatic rhabdomyolysis    Closed displaced fracture of left clavicle    Pneumonia due to infectious organism    ATN (acute tubular necrosis)    Contusion of left knee    Acute osteomyelitis of right foot    Diabetic foot infection    MRSA (methicillin resistant Staphylococcus aureus) infection    Acute kidney injury    Chronic heart failure with preserved ejection fraction (HFpEF)    GI bleed    Peritoneal dialysis status    Hypoalbuminemia    Moderate protein-calorie malnutrition     Past Medical History:   Diagnosis Date    Acquired absence of left foot 02/19/2022    Acute osteomyelitis of right foot 03/07/2025    Allergic rhinitis     Amputated toe of left foot 02/12/2021    Amputated toe of right foot 04/11/2019    ATN (acute tubular necrosis) 03/05/2025    Atrial fibrillation, persistent 10/18/2024    Bladder wall thickening 12/01/2024    Bronchitis     Carotid stenosis, asymptomatic 01/30/2017    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  Added automatically from request for surgery [id number]      Gout     H/O aortic valve replacement with porcine valve 09/10/2018    Hiatal hernia     History of MRSA infection     RIGHT FOOT 2010    Hyperlipidemia     Hypertension     LAFB (left anterior fascicular block) 05/17/2016    MRSA (methicillin resistant Staphylococcus aureus) infection 03/08/2025     Murmur     Neuropathic arthropathy 05/17/2016    Overview:   2015 IMO UPDATE  Overview:   2015 IMO UPDATE      Nocturnal hypoxemia 12/01/2024    Nonrheumatic aortic valve stenosis 01/30/2017    Persistent atrial fibrillation 11/07/2024    Pneumothorax on right     S/P CABG x 2 05/06/2019 July 2018      Sleep apnea     pt wears CPAP at night    Status post left inguinal hernia repair, follow-up exam 01/05/2025    Traumatic rhabdomyolysis 03/03/2025    Type 2 diabetes mellitus with circulatory disorder, without long-term current use of insulin 05/17/2016    Unsteady gait when walking 01/05/2025    Wellness examination 06/24/2015    Annual Wellness Visit     Past Surgical History:   Procedure Laterality Date    ARTERY SURGERY Bilateral     carotid    BONE EXCISION LEG Right 3/10/2025    Procedure: BONE EXCISION LOWER EXTERMITY, RIGHT;  Surgeon: Jimenez Mchugh DPM;  Location: [facility] OR;  Service: Podiatry;  Laterality: Right;    CARDIAC CATHETERIZATION N/A 7/20/2018    Procedure: Left Heart Cath;  Surgeon: Jo Toney MD;  [facility];  Service: Cardiovascular    CARDIAC CATHETERIZATION N/A 7/20/2018    Procedure: Coronary angiography;  Surgeon: Jo Toney MD;  [facility];  Service: Cardiovascular    CAROTID ENDARTERECTOMY Bilateral     COLONOSCOPY  2008    CORONARY ARTERY BYPASS GRAFT WITH AORTIC VALVE REPAIR/REPLACEMENT N/A 7/23/2018    Procedure: INTRAOPERATIVE ALEX, MIDLINE STERNOTOMY, CORONARY ARTERY BYPASS GRAFTING X 3 USING ENDOSCOPICALLY HARVESTED LEFT GREATER SAPHENOUS VEIN,  AORTIC VALVE REPLACEMENT USING 25MM LOPEZ II ULTRA PORCINE VALVE, PRP;  Surgeon: Phong Posey MD;  Location: [facility] OR;  Service: Cardiothoracic    FOOT SURGERY Right 2010    5th digit removal    FOOT SURGERY Left 2011    1 digit removed    INCISION AND DRAINAGE LEG Right 3/28/2020    Procedure: DEBRIDMENT OF RIGHT CALF;  Surgeon: Miriam  Ross Rush MD;  Location: [facility] MAIN OR;  Service: Vascular;  Laterality: Right;    INCISION AND DRAINAGE LEG Right 3/10/2025    Procedure: INCISION AND DRAINAGE LOWER EXTREMITY;  Surgeon: Jimenez Mchugh DPM;  Location: [facility] MAIN OR;  Service: Podiatry;  Laterality: Right;    INGUINAL HERNIA REPAIR Left 11/29/2024    Procedure: INGUINAL HERNIA REPAIR LAPAROSCOPIC WITH DAVINCI ROBOT;  Surgeon: Phong Lazo MD;  Location: [facility] MAIN OR;  Service: Robotics - DaVinci;  Laterality: Left;    INSERTION HEMODIALYSIS CATHETER N/A 3/11/2025    Procedure: HEMODIALYSIS CATHETER INSERTION;  Surgeon: Jeaneth Bonds MD;  Location: [facility] MAIN OR;  Service: Vascular;  Laterality: N/A;    QUADRICEPS TENDON REPAIR Left 5/14/2019    Procedure: Left QUADRICEPS TENDON REPAIR;  Surgeon: Camilo Hunter MD;  Location: [facility] MAIN OR;  Service: Orthopedics    THORACENTESIS Right 11/21/2016    THORACOSCOPY Right 5/8/2017    Procedure: BRONCHOSCOPY, RIGHT VAT,  TOTAL DECORTICATION RIGHT LUNG, PLEURAL BX, PLACEMENT SUBPLEURAL PAIN CAATHETERS X2;  Surgeon: Donald Orlando III, MD;  Location: [facility] OR;  Service:     TONSILECTOMY, ADENOIDECTOMY, BILATERAL MYRINGOTOMY AND TUBES      WOUND DEBRIDEMENT Right 3/13/2025    Procedure: DEBRIDEMENT FOOT, RIGHT;  Surgeon: Jimenez Mchugh DPM;  Location: [facility] MAIN OR;  Service: Podiatry;  Laterality: Right;      General Information       Row Name 05/23/25 5260          Physical Therapy Time and Intention    Document Type evaluation  -MS     Mode of Treatment occupational therapy;physical therapy;co-treatment  Activity limitation due to fatigue/weakness; Working on functional balance and strengthening while performing ADL's  -MS       Row Name 05/23/25 0550          General Information    Patient Profile Reviewed yes  -MS     Prior Level of Function --  Pt. reports he has not ambulated since March of this year.  Mostly uses a lift to get to/from W/C at rehab.  -MS      Existing Precautions/Restrictions fall   Exit alarm;  Pt. reports NWB Right foot; Left Clavicle fx (? weight bearing);  K.I. Left L.E. when attempted to stand due to h/o Meniscus tear (all per pt. report).  -MS     Barriers to Rehab medically complex;previous functional deficit  -MS       Row Name 05/23/25 1057          Cognition    Orientation Status (Cognition) oriented x 3  -MS               User Key  (r) = Recorded By, (t) = Taken By, (c) = Cosigned By      Initials Name Provider Type    Jd Lord YOUNG, PT Physical Therapist                   Mobility       Row Name 05/23/25 1058          Bed Mobility    Supine-Sit Solomon (Bed Mobility) moderate assist (50% patient effort)  -MS     Sit-Supine Solomon (Bed Mobility) moderate assist (50% patient effort)  -MS     Assistive Device (Bed Mobility) repositioning sheet  -MS     Comment, (Bed Mobility) Once sitting EOB, pt. requires CGA-Min. assist x 1 for static sitting balance and Min. assist x 1 for dynamic sitting balance. Posterior lean throughout.  -MS       Row Name 05/23/25 1058          Mobility    Extremity Weight-bearing Status left upper extremity  -MS     Left Upper Extremity (Weight-bearing Status) --  Questionable LUE weight bearing status;  Left clavicular fx.  -MS     Right Lower Extremity (Weight-bearing Status) non weight-bearing (NWB)   -MS               User Key  (r) = Recorded By, (t) = Taken By, (c) = Cosigned By      Initials Name Provider Type    Nelia Lordbalbir VIDAL, PT Physical Therapist                   Obj/Interventions       Row Name 05/23/25 1059          Range of Motion Comprehensive    Comment, General Range of Motion BLE (Imp. 25%)  -MS       Row Name 05/23/25 1059          Strength Comprehensive (MMT)    Comment, General Manual Muscle Testing (MMT) Assessment BLE (3-/5)  -MS       Row Name 05/23/25 1059          Motor Skills    Therapeutic Exercise --  BLE ther. ex. program x 5 reps completed (Hip Flexion, LAQ's)   -MS               User Key  (r) = Recorded By, (t) = Taken By, (c) = Cosigned By      Initials Name Provider Type    MS Jd Kowalski, PT Physical Therapist                   Goals/Plan       Row Name 05/23/25 1101          Bed Mobility Goal 1 (PT)    Activity/Assistive Device (Bed Mobility Goal 1, PT) bed mobility activities, all  -MS     Bowie Level/Cues Needed (Bed Mobility Goal 1, PT) contact guard required  -MS     Time Frame (Bed Mobility Goal 1, PT) long term goal (LTG);1 week  -MS       Row Name 05/23/25 1101          Transfer Goal 1 (PT)    Activity/Assistive Device (Transfer Goal 1, PT) transfers, all;sit-to-stand/stand-to-sit;bed-to-chair/chair-to-bed;walker, rolling  -MS     Bowie Level/Cues Needed (Transfer Goal 1, PT) moderate assist (50-74% patient effort)  Assist x 2  -MS     Time Frame (Transfer Goal 1, PT) long term goal (LTG);1 week  -MS       Row Name 05/23/25 1101          Balance Goal 1 (PT)    Activity/Assistive Device (Balance Goal) sitting dynamic balance  -MS     Bowie Level/Cues Needed (Balance Goal 1, PT) supervision required  -MS     Time Frame (Balance Goal 1, PT) long-term goal (LTG);1 week  -MS       Row Name 05/23/25 1101          Therapy Assessment/Plan (PT)    Planned Therapy Interventions (PT) balance training;bed mobility training;home exercise program;patient/family education;postural re-education;transfer training;strengthening;ROM (range of motion)  -MS               User Key  (r) = Recorded By, (t) = Taken By, (c) = Cosigned By      Initials Name Provider Type    MS KowalskiJd, PT Physical Therapist                   Clinical Impression       Row Name 05/23/25 1100          Pain    Pretreatment Pain Rating 3/10  -MS     Posttreatment Pain Rating 3/10  -MS     Pain Management Interventions nursing notified;positioning techniques utilized  -MS     Pre/Posttreatment Pain Comment Pain at the abdi catheter insertion site   -MS       Row Name  05/23/25 1100          Plan of Care Review    Plan of Care Reviewed With patient  -MS       Row Name 05/23/25 1100          Therapy Assessment/Plan (PT)    Rehab Potential (PT) fair  -MS     Criteria for Skilled Interventions Met (PT) meets criteria  -MS     Therapy Frequency (PT) 5 times/wk  -MS       Row Name 05/23/25 1100          Positioning and Restraints    Pre-Treatment Position in bed  -MS     Post Treatment Position bed  -MS     In Bed notified nsg;supine;call light within reach;encouraged to call for assist;exit alarm on;R waffle boot;L waffle boot  All lines/tubes intact  -MS               User Key  (r) = Recorded By, (t) = Taken By, (c) = Cosigned By      Initials Name Provider Type    Jd Lord, PT Physical Therapist                   Outcome Measures       Row Name 05/23/25 1101 05/23/25 0433       How much help from another person do you currently need...    Turning from your back to your side while in flat bed without using bedrails? 2  -MS 1  -NM    Moving from lying on back to sitting on the side of a flat bed without bedrails? 2  -MS 1  -NM    Moving to and from a bed to a chair (including a wheelchair)? 1  -MS 1  -NM    Standing up from a chair using your arms (e.g., wheelchair, bedside chair)? 1  -MS 1  -NM    Climbing 3-5 steps with a railing? 1  -MS 1  -NM    To walk in hospital room? 1  -MS 1  -NM    AM-PAC 6 Clicks Score (PT) 8  -MS 6  -NM    Highest Level of Mobility Goal Sit at Edge of Bed-3  -MS Bed Activities/Dependent Transfer-2  -NM      Row Name 05/23/25 1107 05/23/25 1101       Functional Assessment    Outcome Measure Options AM-PAC 6 Clicks Daily Activity (OT)  -JW AM-PAC 6 Clicks Basic Mobility (PT)  -MS              User Key  (r) = Recorded By, (t) = Taken By, (c) = Cosigned By      Initials Name Provider Type    Jd Lord, PT Physical Therapist    Bella Duval, RN Registered Nurse    Melanie Carrillo, OT Occupational Therapist                                  Physical Therapy Education       Title: PT OT SLP Therapies (Done)       Topic: Physical Therapy (Done)       Point: Mobility training (Done)       Learning Progress Summary            Patient Acceptance, E,D, VU,NR by MS at 5/23/2025 1102                      Point: Home exercise program (Done)       Learning Progress Summary            Patient Acceptance, E,D, VU,NR by MS at 5/23/2025 1102                      Point: Body mechanics (Done)       Learning Progress Summary            Patient Acceptance, E,D, VU,NR by MS at 5/23/2025 1102                      Point: Precautions (Done)       Learning Progress Summary            Patient Acceptance, E,D, VU,NR by MS at 5/23/2025 1102                                      User Key       Initials Effective Dates Name Provider Type Discipline    MS 06/16/21 -  Jd Kowalski, PT Physical Therapist PT                  PT Recommendation and Plan  Planned Therapy Interventions (PT): balance training, bed mobility training, home exercise program, patient/family education, postural re-education, transfer training, strengthening, ROM (range of motion)  Outcome Evaluation: Pt. is an 79 year old Male admitted to the hospital with anemia.  Pt. has been at a SNF for rehab since March 2025 following a fall (Left Clavicle Fx).  Pt. also underwent Right foot I&D (NWB RLE).  Pt. himself reports that he is NWB RLE and has to use a K.I. for LLE due to a meniscus tear.  Pt. reports that at rehab they were using a Lift to transfer him to/from a W/C for mobility but working with therapy on overall strength.  Pt. currently presents with decreased strength, decreased balance, decreased ROM, and decreased tolerance to functional activity. This AM, pt. requires Mod. assist x 1 for bed mobility. Once sitting EOB, pt. requires CGA-Min. assist x 1 for static sitting balance and Min. assist x 1 for dynamic sitting balance (Posterior lean throughout).  BLE ther. ex. program x 5 reps completed  for general strengthening. Pt. will benefit from skilled inpt. P.T. to address his functional deficits and to assist pt. in regaining his maximum level of independence with functional mobility.     Time Calculation:         PT Charges       Row Name 05/23/25 1108             Time Calculation    Start Time 1015  -MS      Stop Time 1032  -MS      Time Calculation (min) 17 min  -MS      PT Received On 05/23/25  -MS      PT - Next Appointment 05/26/25  -MS      PT Goal Re-Cert Due Date 05/30/25  -MS         Time Calculation- PT    Total Timed Code Minutes- PT 16 minute(s)  -MS                User Key  (r) = Recorded By, (t) = Taken By, (c) = Cosigned By      Initials Name Provider Type    Jd Lord, PT Physical Therapist                  Therapy Charges for Today       Code Description Service Date Service Provider Modifiers Qty    25230184513  PT EVAL MOD COMPLEXITY 3 5/23/2025 Jd Kowalski, PT GP 1    69059760994 HC PT THERAPEUTIC ACT EA 15 MIN 5/23/2025 Jd Kowalski, PT GP 1            PT G-Codes  Outcome Measure Options: AM-PAC 6 Clicks Daily Activity (OT)  AM-PAC 6 Clicks Score (PT): 8  AM-PAC 6 Clicks Score (OT): 13  PT Discharge Summary  Anticipated Discharge Disposition (PT): skilled nursing facility    Jd Kowalski, PT  5/23/2025

## 2025-05-23 NOTE — CASE MANAGEMENT/SOCIAL WORK
Continued Stay Note  [facility]     Patient Name: Rock Maciel Jr.  MRN: [id number]  Today's Date: 5/23/2025    Admit Date: 5/21/2025    Plan: Plan skilled care at accepting facility- pre cert needed.  MARIANNE Solis RN   Discharge Plan       Row Name 05/23/25 1430       Plan    Plan Plan skilled care at accepting facility- pre cert needed.  MARIANNE Solis RN    Patient/Family in Agreement with Plan yes    Plan Comments Mirella ( [phone]) is following pt for [facility] and [facility].  [facility] is closer to pt's home.  Per [facility] pt denied for [facility].  Plan skilled care at accepting facility- pre cert needed. MARIANNE Solis RN                   Discharge Codes    No documentation.                 Expected Discharge Date and Time       Expected Discharge Date Expected Discharge Time    May 26, 2025               Emily Solis, RN

## 2025-05-23 NOTE — PLAN OF CARE
Goal Outcome Evaluation:  Plan of Care Reviewed With: patient           Outcome Evaluation: Pt admitted from [facility] with anemia and rectal bleeding. Has been in rehab since March following a fall resulting in L clavicle fx. Underwent I&D R foot (wound vac recently d/c'ed) also with of TMA on LLE. Pt reports being NWB LUE and chaim LEs at rehab which has limited mobility progression but is now only NWB RLE per pt (confirmed NWB RLE per podietry however unsure WB status LUE/LLE). Pt reports mostly bed level ADLs at rehab, mechanical lift to WC. Today pt is able to sit EOB with ModA, completes grooming tasks with set-up and CGA -Bettina for sitting balance. Requiring total A for LB ADLs/toileting at this time. OT will cont to follow and recommend d/c back to rehab facility.    Anticipated Discharge Disposition (OT): skilled nursing facility

## 2025-05-23 NOTE — PROGRESS NOTES
Infectious Diseases Progress Note    Nicolasa Denny MD     [facility]  [place]: 2 days  Patient Identification:  Name: Rock Maciel Jr.  Age: 79 y.o.  Sex: male  :  1945  MRN: [id number]         Primary Care Physician: Denise Dickson, ZEINA        Subjective: Feeling overall better denies any specific complaints stronger after receiving blood transfusion.  Denies any fever and chills  Interval History: See consultation note.    Objective:    Scheduled Meds:amiodarone, 200 mg, Oral, Daily  [Held by provider] apixaban, 2.5 mg, Oral, BID  vitamin C, 250 mg, Oral, Daily  atorvastatin, 20 mg, Oral, Nightly  bumetanide, 2 mg, Oral, TID  cetaphil, , Topical, Q12H  clobetasol propionate, 1 Application, Topical, Q12H  [Held by provider] clopidogrel, 75 mg, Oral, Daily  epoetin vince/vince-epbx, 10,000 Units, Subcutaneous, Once per day on   [Held by provider] glipizide, 2.5 mg, Oral, BID AC  hydrALAZINE, 10 mg, Oral, TID  insulin lispro, 2-7 Units, Subcutaneous, 4x Daily AC & at Bedtime  [Held by provider] linagliptin, 5 mg, Oral, Daily  Menthol-Zinc Oxide, 1 Application, Topical, BID  metoprolol succinate XL, 25 mg, Oral, Daily  miconazole, 1 Application, Topical, Q12H  pantoprazole, 40 mg, Intravenous, BID AC  sevelamer, 800 mg, Oral, TID With Meals  sodium chloride, 10 mL, Intravenous, Q12H  sodium chloride, 10 mL, Intravenous, Q12H  sodium chloride, 10 mL, Intravenous, Q12H  sodium hypochlorite, , Topical, BID  terazosin, 2 mg, Oral, Nightly      Continuous Infusions:     Vital signs in last 24 hours:  Temp:  [97.5 °F (36.4 °C)-98.1 °F (36.7 °C)] 98.1 °F (36.7 °C)  Heart Rate:  [59-75] 66  Resp:  [16-20] 18  BP: (124-168)/(44-69) 126/62    Intake/Output:    Intake/Output Summary (Last 24 hours) at 2025 0713  Last data filed at 2025 0500  Gross per 24 hour   Intake 1036.25 ml   Output -3625 ml   Net 4661.25 ml       Exam:  /62   Pulse 66   Temp 98.1 °F (36.7  "°C) (Oral)   Resp 18   Ht 185.4 cm (72.99\")   Wt 128 kg (281 lb 12 oz)   SpO2 97%   BMI 37.18 kg/m²   Patient is examined using the personal protective equipment as per guidelines from infection control for this particular patient as enacted.  Hand washing was performed before and after patient interaction.  General Appearance:  Alert and oriented x 3                          Head:    Normocephalic, without obvious abnormality, atraumatic                           Eyes:    PERRL, conjunctivae/corneas clear, EOM's intact, both eyes                         Throat:   Lips, tongue, gums normal; oral mucosa pink and moist                           Neck:   Supple, symmetrical, trachea midline, no JVD                         Lungs:    Clear to auscultation bilaterally, respirations unlabored                 Chest Wall:    No tenderness or deformity                          Heart:  S1-S2 regular                  Abdomen:   Soft nontender three-way catheter in place                 Extremities: No surrounding inflammation.                              Skin: Skin changes due to decubital process noted.                  Neurologic: Alert and oriented x 3       Data Review:    I reviewed the patient's new clinical results.  Results from last 7 days   Lab Units 05/23/25  0533 05/22/25  1604 05/22/25  0535 05/21/25  2157 05/21/25  1303   WBC 10*3/mm3 6.04  --  5.56  --  5.94   HEMOGLOBIN g/dL 9.1* 7.6* 6.6* 7.1* 6.6*   PLATELETS 10*3/mm3 222  --  223  --  294     Results from last 7 days   Lab Units 05/23/25  0533 05/22/25  0535 05/21/25  1303   SODIUM mmol/L 134* 135* 134*   POTASSIUM mmol/L 4.2 3.5 3.9   CHLORIDE mmol/L 98 96* 94*   CO2 mmol/L 28.0 30.0* 32.0*   BUN mg/dL 21 30* 27*   CREATININE mg/dL 2.63* 3.60* 2.89*   CALCIUM mg/dL 8.3* 8.2* 8.5*   GLUCOSE mg/dL 109* 50* 110*     Microbiology Results (last 10 days)       ** No results found for the last 240 hours. **                Assessment:    Anemia    Essential " hypertension    Type 2 diabetes mellitus with circulatory disorder, without long-term current use of insulin    SHASTA (obstructive sleep apnea)    H/O aortic valve replacement with porcine valve    Amputated toe of right foot    S/P CABG x 2    Amputated toe of left foot    Atrial fibrillation, persistent    Persistent atrial fibrillation    CKD (chronic kidney disease) stage 4, GFR 15-29 ml/min    Nocturnal hypoxemia    Unsteady gait when walking    Chronic heart failure with preserved ejection fraction (HFpEF)    GI bleed    Peritoneal dialysis status    Hypoalbuminemia    Moderate protein-calorie malnutrition    79-year-old male with  1-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  Patient currently seems to be stable and does not have evidence of active right foot infection or systemic symptoms.  He does have open wound on the right foot that would require care.  2-severe anemia multifactorial management per primary team  3-immobility with evolving decubitus skin changes  4-end-stage renal disease on dialysis per nephrology service via right IJ tunnel catheter  5-immobilization syndrome #6-diabetes  7-peripheral vascular disease  8-other diagnoses per primary team.        Recommendations/Discussions:  See my discussion and recommendations on 5/23/2025.  Continue with local wound care as clinically there is no evidence of active infection  Continue follow-up as per podiatry recommendations.  Supportive care and management of other issues per primary team.  Nicolasa Denny MD  5/23/2025  07:13 EDT    Parts of this note may be an electronic transcription/translation of spoken language to printed text using the Dragon  dictation system.

## 2025-05-23 NOTE — CONSULTS
[facility]   Inpatient Palliative Care Consultation  Patient Name: Rock Maciel Jr.   Patient : 1945   Date: 2025   Consulting Provider: Dr. Parisi  Reason for Consultation: assistance with advance directives and assistance with clarification of goals of care  Inpatient Palliative Care Team Consult  Consult performed by: Migdalia Rose MD  Consult ordered by: Maria Isabel Parisi MD        Chief Complaint:  introduction to team    Information obtained from: patient and nursing staff    Summary of Palliative Illness and Palliative Assessment:   73 year old male with ESRD (2/2 ATN/rhabdomyolysis from a fall) on PD since 3/2025, recent osteomyelitis s/p I+D, T2DM, HTN, CAD s/p CABG and AVR, atrial fibrillation, prior gangrene of right foot who I am seeing today to familiarize myself with the patient, advance care planning and goals of care.     Rock discussed how he was living independently at home prior to a fall where he was down for 16 hours prior to being able to get to a phone to call EMS; he was hospitalized and was found to have worsening kidney injury on CKD from rhabdo/ATN requiring dialysis as well as a foot injury requiring debridement. He was sent to a SNF after for which he has been pursuing rehab to regain strength. During this stay patient reports he has been feeling worse and has not been able to walk, although he attributes this in part to his right foot wound which had been in a wound vac. Just prior to admission he has been having worsening fatigue and decreased urine output. He was found to be anemic and sent to the hospital. He was found to have urinary retention at the SNF and a abdi was attempted but unsuccessful; abdi placement here resulted in 2300 ml output. He has been followed by urology and due to hematuria is currently undergoing CBI with a possible scope. Also found to have blood in his stool with GI following and possible EGD after clopidogrel  "stopped. Per patient the goal is to go to a different SNF to regain strength to go home.     Introduced our team/services to patient and difference between palliative care and hospice. Discussed we also assist in advance care planning. He reports his medical POA is his daughter (Claudette Maciel) who lives in [place]. He reports she has documentation and he does in his home.     Symptom Assessment:     Pain: reports some pain in his left shoulder with movement from a clavicular injury which is being well managed with lidocaine cream    Itching: reports generalized itching that doesn't improve with dialysis. Per RN hydroxyzine has been administered and ineffective. He has also been sweating more and per RN has excoriations on his back. Patient also reports a wound on his leg is also itchy.    Constipation: reports he required a suppository to stool     Reports dyspnea at times and fatigue. Initially this was relieved by his first blood transfusion however he did not notice a difference after the 2nd or 3rd.   Reports current nasal congestion    Discussion of Patient/Family Treatment Preferences and Goals of Care: There was a voluntary discussion of advance planning and goals of care discussion. The following were present for the visit: patient    Summary of Discussion: Discussed palliative care, ACP and goals as well as performed overview of his medical course from being independent 6 months ago to being unable to walk currently. Discussed his overall declines and current lack of improvement. Also discussed medical interventions from the spectrum of comfort to full aggressive care including CPR/intubation if indicated and how he has seen these in various medical settings. He reports seeing his 104 yo mom with dementia who received aggressive care and he \"couldn't pull the plug.\" His current goal is to go back to SNF after this hospitalization and continue to pursue strength and improvement to see if he can go back to " "living at home. He reports they were discussing Medicaid application for the potential he may not progress and be able to do so and wants to take this currently day by day. He reports his medical conditions \"wear him down\" at times but wants to stay positive. He is tolerating dialysis.  He \"wants to be given a chance\" and if he were to suffer a cardiac arrest would want full supportive treatment. He denies wanting to limit any care at this time. He does not want to change his current healthcare surrogate (daughter Claudette) and reports they talk frequently about his health.     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  Added automatically from request for surgery [id number]      Gout     H/O aortic valve replacement with porcine valve 09/10/2018    Hiatal hernia     History of MRSA infection     RIGHT FOOT 2010    Hyperlipidemia     Hypertension     LAFB (left anterior fascicular block) 05/17/2016    MRSA (methicillin " resistant Staphylococcus aureus) infection 03/08/2025    Murmur     Neuropathic arthropathy 05/17/2016    Overview:   2015 IMO UPDATE  Overview:   2015 IMO UPDATE      Nocturnal hypoxemia 12/01/2024    Nonrheumatic aortic valve stenosis 01/30/2017    Persistent atrial fibrillation 11/07/2024    Pneumothorax on right     S/P CABG x 2 05/06/2019 July 2018      Sleep apnea     pt wears CPAP at night    Status post left inguinal hernia repair, follow-up exam 01/05/2025    Traumatic rhabdomyolysis 03/03/2025    Type 2 diabetes mellitus with circulatory disorder, without long-term current use of insulin 05/17/2016    Unsteady gait when walking 01/05/2025    Wellness examination 06/24/2015    Annual Wellness Visit      Past Surgical History:   Procedure Laterality Date    ARTERY SURGERY Bilateral     carotid    BONE EXCISION LEG Right 3/10/2025    Procedure: BONE EXCISION LOWER EXTERMITY, RIGHT;  Surgeon: Jimenez Mchugh DPM;  Location: [facility] OR;  Service: Podiatry;  Laterality: Right;    CARDIAC CATHETERIZATION N/A 7/20/2018    Procedure: Left Heart Cath;  Surgeon: Jo Toney MD;  Location: [facility] CATH INVASIVE LOCATION;  Service: Cardiovascular    CARDIAC CATHETERIZATION N/A 7/20/2018    Procedure: Coronary angiography;  Surgeon: Jo Toney MD;  Location: [facility] CATH INVASIVE LOCATION;  Service: Cardiovascular    CAROTID ENDARTERECTOMY Bilateral     COLONOSCOPY  2008    CORONARY ARTERY BYPASS GRAFT WITH AORTIC VALVE REPAIR/REPLACEMENT N/A 7/23/2018    Procedure: INTRAOPERATIVE ALEX, MIDLINE STERNOTOMY, CORONARY ARTERY BYPASS GRAFTING X 3 USING ENDOSCOPICALLY HARVESTED LEFT GREATER SAPHENOUS VEIN,  AORTIC VALVE REPLACEMENT USING 25MM LOPEZ II ULTRA PORCINE VALVE, PRP;  Surgeon: Phong Posey MD;  Location: [facility] OR;  Service: Cardiothoracic    FOOT SURGERY Right 2010    5th digit removal    FOOT SURGERY Left 2011    1 digit removed    INCISION AND DRAINAGE LEG Right 3/28/2020     Procedure: DEBRIDMENT OF RIGHT CALF;  Surgeon: Ross Pineda MD;  Location: [facility] MAIN OR;  Service: Vascular;  Laterality: Right;    INCISION AND DRAINAGE LEG Right 3/10/2025    Procedure: INCISION AND DRAINAGE LOWER EXTREMITY;  Surgeon: Jimenez Mchugh DPM;  Location: [facility] MAIN OR;  Service: Podiatry;  Laterality: Right;    INGUINAL HERNIA REPAIR Left 2024    Procedure: INGUINAL HERNIA REPAIR LAPAROSCOPIC WITH DAVINCI ROBOT;  Surgeon: Phong Lazo MD;  Location: [facility] MAIN OR;  Service: Robotics - DaVinci;  Laterality: Left;    INSERTION HEMODIALYSIS CATHETER N/A 3/11/2025    Procedure: HEMODIALYSIS CATHETER INSERTION;  Surgeon: Jeaneth Bonds MD;  Location: [facility] OR;  Service: Vascular;  Laterality: N/A;    QUADRICEPS TENDON REPAIR Left 2019    Procedure: Left QUADRICEPS TENDON REPAIR;  Surgeon: Camilo Hunter MD;  Location: [facility] OR;  Service: Orthopedics    THORACENTESIS Right 2016    THORACOSCOPY Right 2017    Procedure: BRONCHOSCOPY, RIGHT VAT,  TOTAL DECORTICATION RIGHT LUNG, PLEURAL BX, PLACEMENT SUBPLEURAL PAIN CAATHETERS X2;  Surgeon: Donald Orlando III, MD;  Location: [facility] OR;  Service:     TONSILECTOMY, ADENOIDECTOMY, BILATERAL MYRINGOTOMY AND TUBES      WOUND DEBRIDEMENT Right 3/13/2025    Procedure: DEBRIDEMENT FOOT, RIGHT;  Surgeon: Jimenez Mchugh DPM;  Location: [facility] OR;  Service: Podiatry;  Laterality: Right;          Palliative Care Psychosocial and Spiritual Screening    Lived independently at home until  and has been at SNF.     He is ; wife  of breast cancer a few years ago. He has one living daughter in [place] and one who  previously.     He has friends who are a source of support and help with grocery shopping.     No spiritual concerns identified, [employer]      Physical Assessment:   /57   Pulse 73   Temp 98.2 °F (36.8 °C) (Oral)   Resp 18   Ht 182.9 cm  "(72\")   Wt 127 kg (281 lb)   SpO2 97%   BMI 38.11 kg/m²    Palliative Performance Scale: Performance 40% based on the following measures: Ambulation: Mainly in bed, Activity and Evidence of Disease: Unable to do any work, extensive evidence of disease, Self-Care: Mainly assistance required,  Intake: Normal or reduced, LOC: Full, drowsy or confusion  Physical Exam  Constitutional:       Appearance: He is obese. He is not toxic-appearing or diaphoretic.      Comments: Chronically ill appearing, laying up in bed, no acute distress   HENT:      Head: Atraumatic.      Right Ear: External ear normal.      Left Ear: External ear normal.      Nose: Nose normal.      Comments: NC in place     Mouth/Throat:      Mouth: Mucous membranes are moist.      Pharynx: No oropharyngeal exudate.   Eyes:      General: No scleral icterus.        Right eye: No discharge.         Left eye: No discharge.   Cardiovascular:      Rate and Rhythm: Normal rate and regular rhythm.      Pulses: Normal pulses.      Heart sounds: No murmur heard.  Pulmonary:      Effort: Pulmonary effort is normal. No respiratory distress.      Breath sounds: Normal breath sounds. No wheezing.   Abdominal:      General: Bowel sounds are normal.      Palpations: Abdomen is soft.   Genitourinary:     Comments: CBI in place  Skin:     General: Skin is dry.      Findings: Bruising (on left forearm) present.      Comments: Dry skin with fine scales, areas of erythema and area of prior wound on right thigh which seems to be healing with no purulence   Neurological:      Mental Status: He is alert.      Comments: Alert, oriented, no myoclonus, conversational, has decisional capacity and understanding of his medical diseases   Psychiatric:         Mood and Affect: Mood normal.         Behavior: Behavior normal.            Medications:    Current Facility-Administered Medications:     acetaminophen (TYLENOL) tablet 650 mg, 650 mg, Oral, Q4H PRN, 650 mg at 05/22/25 0019 " **OR** acetaminophen (TYLENOL) 160 MG/5ML oral solution 650 mg, 650 mg, Oral, Q4H PRN **OR** acetaminophen (TYLENOL) suppository 650 mg, 650 mg, Rectal, Q4H PRN, Maria Isabel Parisi MD    amiodarone (PACERONE) tablet 200 mg, 200 mg, Oral, Daily, Maria Isabel Parisi MD, 200 mg at 05/23/25 0917    [Held by provider] apixaban (ELIQUIS) tablet 2.5 mg, 2.5 mg, Oral, BID, Maria Isabel Parisi MD    ascorbic acid (VITAMIN C) tablet 250 mg, 250 mg, Oral, Daily, Maria Isabel Parisi MD, 250 mg at 05/23/25 0917    atorvastatin (LIPITOR) tablet 20 mg, 20 mg, Oral, Nightly, Maria Isabel Parisi MD, 20 mg at 05/22/25 2134    sennosides-docusate (PERICOLACE) 8.6-50 MG per tablet 2 tablet, 2 tablet, Oral, BID PRN **AND** polyethylene glycol (MIRALAX) packet 17 g, 17 g, Oral, Daily PRN **AND** bisacodyl (DULCOLAX) EC tablet 5 mg, 5 mg, Oral, Daily PRN **AND** bisacodyl (DULCOLAX) suppository 10 mg, 10 mg, Rectal, Daily PRN, Maria Isabel Parisi MD    bumetanide (BUMEX) tablet 2 mg, 2 mg, Oral, TID, Maria Isabel Parisi MD, 2 mg at 05/23/25 0548    calcium carbonate (TUMS) chewable tablet 500 mg (200 mg elemental), 2 tablet, Oral, BID PRN, Maria Isabel Parisi MD    camphor-menthol (SARNA) 0.5-0.5 % lotion, , Topical, 4x Daily PRN, Migdalia Rose MD    cetaphil lotion, , Topical, Q12H, Maria Isabel Parisi MD, Given at 05/23/25 0918    clobetasol propionate (TEMOVATE) 0.05 % cream 1 Application, 1 Application, Topical, Q12H, Maria Isabel Parisi MD, 1 Application at 05/23/25 0917    [Held by provider] clopidogrel (PLAVIX) tablet 75 mg, 75 mg, Oral, Daily, Maria Isabel Parisi MD    dextrose (D50W) (25 g/50 mL) IV injection 25 g, 25 g, Intravenous, Q15 Min PRN, Maria Isabel Parisi MD    dextrose (GLUTOSE) oral gel 15 g, 15 g, Oral, Q15 Min PRN, Maria Isabel Parisi MD    epoetin vince-epbx (RETACRIT) injection 10,000 Units, 10,000 Units, Subcutaneous, Once per day on Monday Wednesday Friday,  Lisa Turner, APRN, 10,000 Units at 05/23/25 0918    [Held by provider] glipizide (GLUCOTROL) tablet 2.5 mg, 2.5 mg, Oral, BID AC, Maria Isabel Parisi MD    glucagon (GLUCAGEN) injection 1 mg, 1 mg, Intramuscular, Q15 Min PRN, Maria Isabel Parisi MD    heparin injection 300 Units, 3 mL, Intravenous, Daily PRN, Maru Talavera MD    hydrALAZINE (APRESOLINE) tablet 10 mg, 10 mg, Oral, TID, Maria Isabel Parisi MD, 10 mg at 05/23/25 0916    hydrOXYzine (ATARAX) tablet 25 mg, 25 mg, Oral, TID PRN, Nicole Torres, APRN, 25 mg at 05/23/25 0917    insulin lispro (HUMALOG/ADMELOG) injection 2-7 Units, 2-7 Units, Subcutaneous, 4x Daily AC & at Bedtime, Maria Isabel Parisi MD    Lidocaine HCl gel (XYLOCAINE) urethral/mucosal syringe, , Urethral, PRN, Ivette Mancia, APRN    [Held by provider] linagliptin (TRADJENTA) tablet 5 mg, 5 mg, Oral, Daily, Maria Isabel Parisi MD    melatonin tablet 5 mg, 5 mg, Oral, Nightly PRN, Maria Isabel Parisi MD, 5 mg at 05/22/25 2129    Menthol-Zinc Oxide 1 Application, 1 Application, Topical, BID, Wesley Christianson MD, 1 Application at 05/23/25 0920    metoprolol succinate XL (TOPROL-XL) 24 hr tablet 25 mg, 25 mg, Oral, Daily, Maria Isabel Parisi MD, 25 mg at 05/23/25 0917    miconazole (MICOTIN) 2 % powder 1 Application, 1 Application, Topical, Q12H, Wesley Christianson MD, 1 Application at 05/23/25 0919    nitroglycerin (NITROSTAT) SL tablet 0.4 mg, 0.4 mg, Sublingual, Q5 Min PRN, Maria Isabel Parisi MD    ondansetron ODT (ZOFRAN-ODT) disintegrating tablet 4 mg, 4 mg, Oral, Q6H PRN **OR** ondansetron (ZOFRAN) injection 4 mg, 4 mg, Intravenous, Q6H PRN, Maria Isabel Parisi MD    oxybutynin (DITROPAN) tablet 5 mg, 5 mg, Oral, TID PRN, Ivette Mancia APRN, 5 mg at 05/22/25 1141    oxyCODONE-acetaminophen (PERCOCET) 5-325 MG per tablet 1 tablet, 1 tablet, Oral, Q4H PRN, Wesley Christianson MD, 1 tablet at 05/23/25 1055    pantoprazole  (PROTONIX) injection 40 mg, 40 mg, Intravenous, BID AC, Maria Isabel Parisi MD, 40 mg at 05/23/25 0916    sevelamer (RENVELA) tablet 800 mg, 800 mg, Oral, TID With Meals, Maria Isabel Parisi MD, 800 mg at 05/23/25 0916    sodium chloride 0.9 % flush 10 mL, 10 mL, Intravenous, PRN, Haja Lo MD    sodium chloride 0.9 % flush 10 mL, 10 mL, Intravenous, Q12H, Maria Isabel Parisi MD, 10 mL at 05/23/25 0920    sodium chloride 0.9 % flush 10 mL, 10 mL, Intravenous, PRN, Maria Isabel Parisi MD    sodium chloride 0.9 % flush 10 mL, 10 mL, Intravenous, Q12H, Maru Talavera MD, 10 mL at 05/23/25 0920    sodium chloride 0.9 % flush 10 mL, 10 mL, Intravenous, Q12H, Maru Talavera MD, 10 mL at 05/23/25 0920    sodium chloride 0.9 % flush 10 mL, 10 mL, Intravenous, PRN, Maru Talavera MD    sodium chloride 0.9 % flush 20 mL, 20 mL, Intravenous, PRN, Maru Talavera MD    sodium chloride 0.9 % infusion 40 mL, 40 mL, Intravenous, PRN, Maria Isabel Parisi MD    sodium chloride 0.9 % infusion 40 mL, 40 mL, Intravenous, PRN, Maru Talavera MD    sodium hypochlorite (DAKIN'S 1/4 STRENGTH) 0.125 % topical solution 0.125% solution, , Topical, BID, Wesley Christianson MD, Given at 05/23/25 0920    tamsulosin (FLOMAX) 24 hr capsule 0.4 mg, 0.4 mg, Oral, Daily, Juan A Gutierrez MD, 0.4 mg at 05/23/25 0917     Allergies:   Allergies   Allergen Reactions    Ceclor [Cefaclor] Rash     Rash in his 50s; he tolerated piperacillin-tazobactam in March 2020          Data (labs/images reviewed):   WBC   Date Value Ref Range Status   05/23/2025 6.04 3.40 - 10.80 10*3/mm3 Final     RBC   Date Value Ref Range Status   05/23/2025 3.06 (L) 4.14 - 5.80 10*6/mm3 Final     Hemoglobin   Date Value Ref Range Status   05/23/2025 9.1 (L) 13.0 - 17.7 g/dL Final     Hematocrit   Date Value Ref Range Status   05/23/2025 28.1 (L) 37.5 - 51.0 % Final     MCV   Date Value Ref Range Status   05/23/2025 91.8  79.0 - 97.0 fL Final     MCH   Date Value Ref Range Status   05/23/2025 29.7 26.6 - 33.0 pg Final     MCHC   Date Value Ref Range Status   05/23/2025 32.4 31.5 - 35.7 g/dL Final     RDW   Date Value Ref Range Status   05/23/2025 15.8 (H) 12.3 - 15.4 % Final     RDW-SD   Date Value Ref Range Status   05/23/2025 52.9 37.0 - 54.0 fl Final     MPV   Date Value Ref Range Status   05/23/2025 8.9 6.0 - 12.0 fL Final     Platelets   Date Value Ref Range Status   05/23/2025 222 140 - 450 10*3/mm3 Final     Neutrophil %   Date Value Ref Range Status   05/23/2025 77.9 (H) 42.7 - 76.0 % Final     Lymphocyte %   Date Value Ref Range Status   05/23/2025 9.8 (L) 19.6 - 45.3 % Final     Monocyte %   Date Value Ref Range Status   05/23/2025 6.8 5.0 - 12.0 % Final     Eosinophil %   Date Value Ref Range Status   05/23/2025 4.3 0.3 - 6.2 % Final     Basophil %   Date Value Ref Range Status   05/23/2025 0.5 0.0 - 1.5 % Final     Immature Grans %   Date Value Ref Range Status   05/23/2025 0.7 (H) 0.0 - 0.5 % Final     Neutrophils, Absolute   Date Value Ref Range Status   05/23/2025 4.71 1.70 - 7.00 10*3/mm3 Final     Lymphocytes, Absolute   Date Value Ref Range Status   05/23/2025 0.59 (L) 0.70 - 3.10 10*3/mm3 Final     Monocytes, Absolute   Date Value Ref Range Status   05/23/2025 0.41 0.10 - 0.90 10*3/mm3 Final     Eosinophils, Absolute   Date Value Ref Range Status   05/23/2025 0.26 0.00 - 0.40 10*3/mm3 Final     Basophils, Absolute   Date Value Ref Range Status   05/23/2025 0.03 0.00 - 0.20 10*3/mm3 Final     Immature Grans, Absolute   Date Value Ref Range Status   05/23/2025 0.04 0.00 - 0.05 10*3/mm3 Final     nRBC   Date Value Ref Range Status   05/23/2025 0.0 0.0 - 0.2 /100 WBC Final        Lab Results   Component Value Date    GLUCOSE 109 (H) 05/23/2025    BUN 21 05/23/2025    CREATININE 2.63 (H) 05/23/2025     (L) 05/23/2025    K 4.2 05/23/2025    CL 98 05/23/2025    CALCIUM 8.3 (L) 05/23/2025    PROTEINTOT 7.1 05/23/2025     ALBUMIN 2.9 (L) 05/23/2025    ALT 10 05/23/2025    AST 13 05/23/2025    ALKPHOS 96 05/23/2025    BILITOT 0.4 05/23/2025    GLOB 4.2 05/23/2025    AGRATIO 0.7 05/23/2025    BCR 8.0 05/23/2025    ANIONGAP 8.0 05/23/2025    EGFR 23.8 (L) 05/23/2025        Adult Transthoracic Echo Complete W/ Cont if Necessary Per Protocol    Left ventricular ejection fraction appears to be 56 - 60%.    Left ventricular diastolic function was normal.    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 Mild transvalvular   regurgitation is present in the prosthetic aortic valve.    Estimated right ventricular systolic pressure from tricuspid   regurgitation is markedly elevated (>55 mmHg).           Palliative Care Assessment and Recommendations: Rock Maciel Jr. is a 79 y.o. male with a primary palliative care diagnosis of ESRD who was hospitalized for acute anemia and is currently being worked up and managed. Palliative care was consulted for goals of care and advance care planning.     Prognosis and Palliative Performance Scale:  Performance 40% based on the following measures: Ambulation: Mainly in bed, Activity and Evidence of Disease: Unable to do any work, extensive evidence of disease, Self-Care: Mainly assistance required,  Intake: Normal or reduced, LOC: Full, drowsy or confusion  Disease State: Controlled with current treatments  Symptom Management:   Pain: continue PRN acetaminophen   Shortness of Breath: continue PRN O2, consider opiates   Constipation: continue regimen per hospitalist, consider scheduling senna  Nasal congestion: ordered  Pruritus: likely related to dry skin, less likely uremia given current lab values. Will order Sarna lotion given dry skin. Hydroxyzine doesn't seem to be effective. Could consider low dose gabapentin or baclofen.   Goals of Care Treatment Preferences   Palliative Care Decision Making Capacity: intact  Healthcare Surrogate:  needs to bring in paperwork, per patient is daughter Claudette  Raheem  What is Most important to patient/family at this time:   Code Status FULL   Other Considerations regarding life sustaining treatments:   Advance Directives Present or Completed: Emotional Support  Follow up: will follow inpatient for further support. May benefit from outpatient PC referral.   Discussed plan with: patient and nursing      I spent a total of 90 minutes today caring for patient including reviewing records, speaking with patient/family and collaborating with care team.         Thank you for consulting palliative care. If you need to reach the provider on call for the palliative care team please call [phone]      Migdalia Rose MD  5/23/2025 12:01 EDT

## 2025-05-23 NOTE — THERAPY EVALUATION
Patient Name: Rock Maciel Jr.  : 1945    MRN: [id number]                              Today's Date: 2025       Admit Date: 2025    Visit Dx:     ICD-10-CM ICD-9-CM   1. Acute blood loss anemia  D62 285.1   2. Rectal bleeding  K62.5 569.3   3. ESRD on hemodialysis  N18.6 585.6    Z99.2 V45.11     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    Chronic venous insufficiency    Nonrheumatic aortic valve stenosis    Carotid stenosis, asymptomatic    Bradycardia    Hyperlipidemia    Bilateral carotid artery disease    Abnormal cardiovascular stress test    H/O aortic valve replacement with porcine valve    Charcot-Davida-Tooth disease-like deformity of foot    Amputated toe of right foot    Fall    Non-traumatic rhabdomyolysis    S/P CABG x 2    CKD (chronic kidney disease) stage 3, GFR 30-59 ml/min    Rupture of left quadriceps tendon    Constipation    KILLIAN (acute kidney injury)    Wound of right leg    Anemia    Chronic diastolic (congestive) heart failure    Amputated toe of left foot    Gas gangrene    Cellulitis of left lower limb    Acquired absence of left foot    Bilateral lower extremity edema    Stage 3b chronic kidney disease    History of pneumonia    Atelectasis of both lungs    Abnormal pleural fluid    Pleural thickening    Atrial fibrillation, persistent    Chronic anticoagulation    Persistent atrial fibrillation    Bladder wall thickening    Hematuria    CKD (chronic kidney disease) stage 4, GFR 15-29 ml/min    Hypoxemia    Nocturnal hypoxemia    Status post left inguinal hernia repair,  follow-up exam    Weakness of both lower extremities    Unsteady gait when walking    Wound, open, arm, forearm, right, subsequent encounter    Traumatic rhabdomyolysis    Closed displaced fracture of left clavicle    Pneumonia due to infectious organism    ATN (acute tubular necrosis)    Contusion of left knee    Acute osteomyelitis of right foot    Diabetic foot infection    MRSA (methicillin resistant Staphylococcus aureus) infection    Acute kidney injury    Chronic heart failure with preserved ejection fraction (HFpEF)    GI bleed    Peritoneal dialysis status    Hypoalbuminemia    Moderate protein-calorie malnutrition     Past Medical History:   Diagnosis Date    Acquired absence of left foot 02/19/2022    Acute osteomyelitis of right foot 03/07/2025    Allergic rhinitis     Amputated toe of left foot 02/12/2021    Amputated toe of right foot 04/11/2019    ATN (acute tubular necrosis) 03/05/2025    Atrial fibrillation, persistent 10/18/2024    Bladder wall thickening 12/01/2024    Bronchitis     Carotid stenosis, asymptomatic 01/30/2017    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  Added automatically from request for surgery [id number]      Gout     H/O aortic valve replacement with porcine valve 09/10/2018    Hiatal hernia     History of MRSA infection     RIGHT FOOT 2010    Hyperlipidemia     Hypertension     LAFB (left anterior fascicular block) 05/17/2016    MRSA (methicillin resistant Staphylococcus aureus) infection 03/08/2025     Murmur     Neuropathic arthropathy 05/17/2016    Overview:   2015 IMO UPDATE  Overview:   2015 IMO UPDATE      Nocturnal hypoxemia 12/01/2024    Nonrheumatic aortic valve stenosis 01/30/2017    Persistent atrial fibrillation 11/07/2024    Pneumothorax on right     S/P CABG x 2 05/06/2019 July 2018      Sleep apnea     pt wears CPAP at night    Status post left inguinal hernia repair, follow-up exam 01/05/2025    Traumatic rhabdomyolysis 03/03/2025    Type 2 diabetes mellitus with circulatory disorder, without long-term current use of insulin 05/17/2016    Unsteady gait when walking 01/05/2025    Wellness examination 06/24/2015    Annual Wellness Visit     Past Surgical History:   Procedure Laterality Date    ARTERY SURGERY Bilateral     carotid    BONE EXCISION LEG Right 3/10/2025    Procedure: BONE EXCISION LOWER EXTERMITY, RIGHT;  Surgeon: Jimenez Mchugh DPM;  Location: [facility] OR;  Service: Podiatry;  Laterality: Right;    CARDIAC CATHETERIZATION N/A 7/20/2018    Procedure: Left Heart Cath;  Surgeon: Jo Toney MD;  [facility];  Service: Cardiovascular    CARDIAC CATHETERIZATION N/A 7/20/2018    Procedure: Coronary angiography;  Surgeon: Jo Toney MD;  [facility];  Service: Cardiovascular    CAROTID ENDARTERECTOMY Bilateral     COLONOSCOPY  2008    CORONARY ARTERY BYPASS GRAFT WITH AORTIC VALVE REPAIR/REPLACEMENT N/A 7/23/2018    Procedure: INTRAOPERATIVE ALEX, MIDLINE STERNOTOMY, CORONARY ARTERY BYPASS GRAFTING X 3 USING ENDOSCOPICALLY HARVESTED LEFT GREATER SAPHENOUS VEIN,  AORTIC VALVE REPLACEMENT USING 25MM LOPEZ II ULTRA PORCINE VALVE, PRP;  Surgeon: Phong Posey MD;  Location: [facility] OR;  Service: Cardiothoracic    FOOT SURGERY Right 2010    5th digit removal    FOOT SURGERY Left 2011    1 digit removed    INCISION AND DRAINAGE LEG Right 3/28/2020    Procedure: DEBRIDMENT OF RIGHT CALF;  Surgeon: Miriam  Ross Rush MD;  Location: [facility] MAIN OR;  Service: Vascular;  Laterality: Right;    INCISION AND DRAINAGE LEG Right 3/10/2025    Procedure: INCISION AND DRAINAGE LOWER EXTREMITY;  Surgeon: Jimenez Mchugh DPM;  Location: [facility] MAIN OR;  Service: Podiatry;  Laterality: Right;    INGUINAL HERNIA REPAIR Left 11/29/2024    Procedure: INGUINAL HERNIA REPAIR LAPAROSCOPIC WITH DAVINCI ROBOT;  Surgeon: Phong Lazo MD;  Location: [facility] MAIN OR;  Service: Robotics - DaVinci;  Laterality: Left;    INSERTION HEMODIALYSIS CATHETER N/A 3/11/2025    Procedure: HEMODIALYSIS CATHETER INSERTION;  Surgeon: Jeaneth Bonds MD;  Location: [facility] MAIN OR;  Service: Vascular;  Laterality: N/A;    QUADRICEPS TENDON REPAIR Left 5/14/2019    Procedure: Left QUADRICEPS TENDON REPAIR;  Surgeon: Camilo Hunter MD;  Location: [facility] MAIN OR;  Service: Orthopedics    THORACENTESIS Right 11/21/2016    THORACOSCOPY Right 5/8/2017    Procedure: BRONCHOSCOPY, RIGHT VAT,  TOTAL DECORTICATION RIGHT LUNG, PLEURAL BX, PLACEMENT SUBPLEURAL PAIN CAATHETERS X2;  Surgeon: Donald Orlando III, MD;  Location: [facility] OR;  Service:     TONSILECTOMY, ADENOIDECTOMY, BILATERAL MYRINGOTOMY AND TUBES      WOUND DEBRIDEMENT Right 3/13/2025    Procedure: DEBRIDEMENT FOOT, RIGHT;  Surgeon: Jimenez Mchugh DPM;  Location: [facility] MAIN OR;  Service: Podiatry;  Laterality: Right;      General Information       Row Name 05/23/25 1044          OT Time and Intention    Subjective Information no complaints  -JW     Document Type evaluation  -JW     Mode of Treatment occupational therapy;co-treatment  -JW     Symptoms Noted During/After Treatment none  -JW       Row Name 05/23/25 1044          General Information    Patient Profile Reviewed yes  -JW     Prior Level of Function dependent:;transfer  currently mechanical lift to  at rehab facility, most ADLs at bed level. Has been NWB to [facility] and [facility] (recent clavicle fx in March) but now only  NWB on RLE  -     Existing Precautions/Restrictions fall;weight bearing;other (see comments)  NWB RLE. Recently cleared for WB through LLE and LUE (per pt)  -     Barriers to Rehab previous functional deficit;medically complex  -       Row Name 05/23/25 1044          Living Environment    Current Living Arrangements extended care facility  -     People in Home facility resident  -       Row Name 05/23/25 1044          Cognition    Orientation Status (Cognition) oriented x 4  -       Row Name 05/23/25 1044          Safety Issues/Impairments Affecting Functional Mobility    Impairments Affecting Function (Mobility) balance;endurance/activity tolerance;pain;postural/trunk control;range of motion (ROM);strength  -     Comment, Safety Issues/Impairments (Mobility) Pt is co-tx appropriate d/t decreased activity tolerance and to maximize pt participation and safety with functional activity.  -               User Key  (r) = Recorded By, (t) = Taken By, (c) = Cosigned By      Initials Name Provider Type     Melanie Mcallister OT Occupational Therapist                     Mobility/ADL's       Row Name 05/23/25 1054          Bed Mobility    Bed Mobility supine-sit;sit-supine  -     Supine-Sit [place] (Bed Mobility) moderate assist (50% patient effort);verbal cues  -     Sit-Supine [place] (Bed Mobility) moderate assist (50% patient effort);verbal cues  -     Assistive Device (Bed Mobility) head of bed elevated  -       Row Name 05/23/25 1054          Activities of Daily Living    BADL Assessment/Intervention lower body dressing;grooming;toileting  -       Row Name 05/23/25 1054          Mobility    Extremity Weight-bearing Status right lower extremity  -     Right Lower Extremity (Weight-bearing Status) non weight-bearing (NWB)  questionable WB status LUE (was NWB in March following clavicle fx but pt states he is now allowed to WBAT)  -       Row Name 05/23/25 1054          Lower Body  Dressing Assessment/Training    [place] Level (Lower Body Dressing) don;doff;socks;dependent (less than 25% patient effort)  -     Position (Lower Body Dressing) edge of bed sitting  -[facility] Name 05/23/25 1054          Grooming Assessment/Training    [place] Level (Grooming) grooming skills;oral care regimen;set up  -     Position (Grooming) edge of bed sitting  -     Comment, (Grooming) set-up at EOB for grooming. CGA-Bettina for dyn sitting balance with task  -[facility] Name 05/23/25 1054          Toileting Assessment/Training    [place] Level (Toileting) dependent (less than 25% patient effort)  -     Comment, (Toileting) continuous bladder irrigation  -               User Key  (r) = Recorded By, (t) = Taken By, (c) = Cosigned By      Initials Name Provider Type    JW Melanie Mcallister OT Occupational Therapist                   Obj/Interventions       [facility] Name 05/23/25 1056          Sensory Assessment (Somatosensory)    Sensory Assessment (Somatosensory) not tested  -JW       Row Name 05/23/25 1056          Vision Assessment/Intervention    Visual Impairment/Limitations WNL  -JW       Row Name 05/23/25 1056          Range of Motion Comprehensive    Comment, General Range of Motion RUE WFL. L shoulder flexion/abduction to ~90 deg  -[facility] Name 05/23/25 1056          Strength Comprehensive (MMT)    Comment, General Manual Muscle Testing (MMT) Assessment UE strength 4/5 grossly  -[facility] Name 05/23/25 1056          Balance    Balance Assessment sitting static balance;sitting dynamic balance  -     Static Sitting Balance standby assist  -     Dynamic Sitting Balance contact guard;minimal assist  -     Position, Sitting Balance sitting edge of bed  -     Balance Interventions occupation based/functional task;sitting  -     Comment, Balance posterior leaning  -               User Key  (r) = Recorded By, (t) = Taken By, (c) = Cosigned By      Initials Name Provider Type     JW Melanie Mcallister, OT Occupational Therapist                   Goals/Plan       Row Name 05/23/25 1106          Bed Mobility Goal 1 (OT)    Activity/Assistive Device (Bed Mobility Goal 1, OT) bed mobility activities, all  -JW     [place] Level/Cues Needed (Bed Mobility Goal 1, OT) minimum assist (75% or more patient effort)  -JW     Time Frame (Bed Mobility Goal 1, OT) 2 weeks  -JW     Progress/Outcomes (Bed Mobility Goal 1, OT) goal ongoing  -       Row Name 05/23/25 1106          Transfer Goal 1 (OT)    Activity/Assistive Device (Transfer Goal 1, OT) transfers, all  -JW     [place] Level/Cues Needed (Transfer Goal 1, OT) maximum assist (25-49% patient effort)  -JW     Time Frame (Transfer Goal 1, OT) 2 weeks  -JW     Progress/Outcome (Transfer Goal 1, OT) goal ongoing  -       Row Name 05/23/25 1106          Bathing Goal 1 (OT)    Activity/Device (Bathing Goal 1, OT) bathing skills, all  -JW     [place] Level/Cues Needed (Bathing Goal 1, OT) moderate assist (50-74% patient effort)  -JW     Time Frame (Bathing Goal 1, OT) 2 weeks  -JW     Strategies/Barriers (Bathing Goal 1, OT) sitting EOB  -JW     Progress/Outcomes (Bathing Goal 1, OT) goal ongoing  -       Row Name 05/23/25 1106          Dressing Goal 1 (OT)    Activity/Device (Dressing Goal 1, OT) dressing skills, all  -JW     [place]/Cues Needed (Dressing Goal 1, OT) moderate assist (50-74% patient effort)  -JW     Time Frame (Dressing Goal 1, OT) 2 weeks  -JW     Progress/Outcome (Dressing Goal 1, OT) goal ongoing  -       Row Name 05/23/25 1106          Strength Goal 1 (OT)    Strength Goal 1 (OT) Pt will participate in UE therex to promote and maintain strength for participation in ADLs  -JW     Time Frame (Strength Goal 1, OT) 2 weeks  -JW     Progress/Outcome (Strength Goal 1, OT) goal ongoing  -       Row Name 05/23/25 1106          Therapy Assessment/Plan (OT)    Planned Therapy Interventions (OT) activity tolerance  training;functional balance retraining;occupation/activity based interventions;BADL retraining;patient/caregiver education/training;transfer/mobility retraining;strengthening exercise  -               User Key  (r) = Recorded By, (t) = Taken By, (c) = Cosigned By      Initials Name Provider Type    Melanie Carrillo OT Occupational Therapist                   Clinical Impression       Row Name 05/23/25 1057          Pain Assessment    Pretreatment Pain Rating 3/10  -     Posttreatment Pain Rating 3/10  -     Pre/Posttreatment Pain Comment reports pain from catheter  -       Row Name 05/23/25 1057          Plan of Care Review    Plan of Care Reviewed With patient  -     Outcome Evaluation Pt admitted from [facility] with anemia and rectal bleeding. Has been in rehab since March following a fall resulting in L clavicle fx. Underwent I&D R foot (wound vac recently d/c'ed) also with of TMA on LLE. Pt reports being NWB LUE and chaim LEs at rehab which has limited mobility progression but is now only NWB RLE per pt (confirmed NWB RLE per podietry however unsure WB status LUE/LLE). Pt reports mostly bed level ADLs at rehab, mechanical lift to WC. Today pt is able to sit EOB with ModA, completes grooming tasks with set-up and CGA -Bettina for sitting balance. Requiring total A for LB ADLs/toileting at this time. OT will cont to follow and recommend d/c back to rehab facility.  -       Row Name 05/23/25 1057          Therapy Assessment/Plan (OT)    Criteria for Skilled Therapeutic Interventions Met (OT) yes;skilled treatment is necessary  -     Therapy Frequency (OT) 3 times/wk  -       Row Name 05/23/25 1057          Therapy Plan Review/Discharge Plan (OT)    Anticipated Discharge Disposition (OT) skilled nursing facility  -       Row Name 05/23/25 1057          Positioning and Restraints    Pre-Treatment Position in bed  -     Post Treatment Position bed  -JW     In Bed notified lenny;ankita;call light within  reach;encouraged to call for assist;exit alarm on;waffle boots/both  -JW               User Key  (r) = Recorded By, (t) = Taken By, (c) = Cosigned By      Initials Name Provider Type    Melanie Carrillo OT Occupational Therapist                   Outcome Measures       Row Name 05/23/25 1107          How much help from another is currently needed...    Putting on and taking off regular lower body clothing? 1  -JW     Bathing (including washing, rinsing, and drying) 2  -JW     Toileting (which includes using toilet bed pan or urinal) 1  -JW     Putting on and taking off regular upper body clothing 2  -JW     Taking care of personal grooming (such as brushing teeth) 3  -JW     Eating meals 4  -JW     AM-PAC 6 Clicks Score (OT) 13  -JW       Row Name 05/23/25 1101 05/23/25 0433       How much help from another person do you currently need...    Turning from your back to your side while in flat bed without using bedrails? 2  -MS 1  -NM    Moving from lying on back to sitting on the side of a flat bed without bedrails? 2  -MS 1  -NM    Moving to and from a bed to a chair (including a wheelchair)? 1  -MS 1  -NM    Standing up from a chair using your arms (e.g., wheelchair, bedside chair)? 1  -MS 1  -NM    Climbing 3-5 steps with a railing? 1  -MS 1  -NM    To walk in hospital room? 1  -MS 1  -NM    AM-PAC 6 Clicks Score (PT) 8  -MS 6  -NM    Highest Level of Mobility Goal Sit at Edge of Bed-3  -MS Bed Activities/Dependent Transfer-2  -NM      Row Name 05/23/25 1107 05/23/25 1101       Functional Assessment    Outcome Measure Options AM-PAC 6 Clicks Daily Activity (OT)  -JW AM-PAC 6 Clicks Basic Mobility (PT)  -MS              User Key  (r) = Recorded By, (t) = Taken By, (c) = Cosigned By      Initials Name Provider Type    Jd Lord, PT Physical Therapist    NM Bella Flanagan, RN Registered Nurse    Melanie Carrillo OT Occupational Therapist                    Occupational Therapy Education       Title: PT OT  SLP Therapies (Done)       Topic: Occupational Therapy (Done)       Point: ADL training (Done)       Learning Progress Summary            Patient Acceptance, E, VU by  at 5/23/2025 1108                      Point: Home exercise program (Done)       Learning Progress Summary            Patient Acceptance, E, VU by  at 5/23/2025 1108                      Point: Precautions (Done)       Learning Progress Summary            Patient Acceptance, E, VU by  at 5/23/2025 1108                      Point: Body mechanics (Done)       Learning Progress Summary            Patient Acceptance, E, VU by  at 5/23/2025 1108                                      User Key       Initials Effective Dates Name Provider Type Discipline     06/10/21 -  Melanie Mcallister OT Occupational Therapist OT                  OT Recommendation and Plan  Planned Therapy Interventions (OT): activity tolerance training, functional balance retraining, occupation/activity based interventions, BADL retraining, patient/caregiver education/training, transfer/mobility retraining, strengthening exercise  Therapy Frequency (OT): 3 times/wk  Plan of Care Review  Plan of Care Reviewed With: patient  Outcome Evaluation: Pt admitted from [facility] [place] with anemia and rectal bleeding. Has been in rehab since March following a fall resulting in L clavicle fx. Underwent I&D R foot (wound vac recently d/c'ed) also with of TMA on LLE. Pt reports being NWB LUE and chaim LEs at rehab which has limited mobility progression but is now only NWB RLE per pt (confirmed NWB RLE per podietry however unsure WB status LUE/LLE). Pt reports mostly bed level ADLs at rehab, mechanical lift to WC. Today pt is able to sit EOB with ModA, completes grooming tasks with set-up and CGA -Bettina for sitting balance. Requiring total A for LB ADLs/toileting at this time. OT will cont to follow and recommend d/c back to rehab facility.     Time Calculation:   Evaluation Complexity (OT)  Review  Occupational Profile/Medical/Therapy History Complexity: expanded/moderate complexity  Assessment, Occupational Performance/Identification of Deficit Complexity: 3-5 performance deficits  Clinical Decision Making Complexity (OT): detailed assessment/moderate complexity  Overall Complexity of Evaluation (OT): moderate complexity     Time Calculation- OT       Row Name 05/23/25 1108             Time Calculation- OT    OT Start Time 1014  -JW      OT Stop Time 1032  -JW      OT Time Calculation (min) 18 min  -JW      Total Timed Code Minutes- OT 8 minute(s)  -JW      OT Received On 05/23/25  -JW      OT - Next Appointment 05/27/25  -      OT Goal Re-Cert Due Date 06/06/25  -         Timed Charges    91240 - OT Self Care/Mgmt Minutes 8  -JW         Untimed Charges    OT Eval/Re-eval Minutes 10  -JW         Total Minutes    Timed Charges Total Minutes 8  -JW      Untimed Charges Total Minutes 10  -JW       Total Minutes 18  -JW                User Key  (r) = Recorded By, (t) = Taken By, (c) = Cosigned By      Initials Name Provider Type     Melanie Mcallister OT Occupational Therapist                  Therapy Charges for Today       Code Description Service Date Service Provider Modifiers Qty    [id number]  OT SELF CARE/MGMT/TRAIN EA 15 MIN 5/23/2025 Melanie Mcallister OT GO 1    [id number]  OT EVAL MOD COMPLEXITY 3 5/23/2025 Melanie Mcallister OT GO 1                 Melanie Mcallister OT  5/23/2025

## 2025-05-23 NOTE — PROGRESS NOTES
[facility]     Progress Note    Patient Name: Rock Maciel Jr.  : 1945  MRN: [id number]  Primary Care Physician:  Denise Dickson, ZEINA  Date of admission: 2025    Subjective   Subjective     Chief Complaint: ANEMIA, HEME +     History of Present Illness  Patient Reports FEELING  much better after transfusion.  In good spirits     Review of Systems   Musculoskeletal:  Positive for arthralgias.   Neurological:  Positive for weakness.       Objective   Objective     Vitals:   Temp:  [97.5 °F (36.4 °C)-98.2 °F (36.8 °C)] 98.2 °F (36.8 °C)  Heart Rate:  [59-73] 73  Resp:  [16-18] 18  BP: (124-169)/(44-69) 169/57  Flow (L/min) (Oxygen Therapy):  [2] 2    Physical Exam  HENT:      Right Ear: External ear normal.      Left Ear: External ear normal.      Mouth/Throat:      Pharynx: Oropharynx is clear.   Eyes:      Conjunctiva/sclera: Conjunctivae normal.   Cardiovascular:      Rate and Rhythm: Normal rate.      Pulses: Normal pulses.      Heart sounds: Murmur heard.   Pulmonary:      Effort: Pulmonary effort is normal.      Breath sounds: Rhonchi present.   Skin:     General: Skin is warm and dry.   Neurological:      General: No focal deficit present.      Mental Status: He is alert.   Psychiatric:         Mood and Affect: Mood normal.          Result Review    Result Review:  I have personally reviewed the results from the time of this admission to 2025 11:53 EDT and agree with these findings:  []  Laboratory list / accordion  []  Microbiology  []  Radiology  []  EKG/Telemetry   []  Cardiology/Vascular   []  Pathology  []  Old records  []  Other:  Most notable findings include:       Assessment & Plan   Assessment / Plan     Brief Patient Summary:  Rock Maciel Jr. is a 79 y.o. male who   Anemia of chronic disease  Heme + stool     Active Hospital Problems:  Active Hospital Problems    Diagnosis     **Anemia     Moderate protein-calorie malnutrition     GI bleed     Peritoneal dialysis  status     Hypoalbuminemia     Chronic heart failure with preserved ejection fraction (HFpEF)     Unsteady gait when walking     Nocturnal hypoxemia     CKD (chronic kidney disease) stage 4, GFR 15-29 ml/min     Persistent atrial fibrillation     Atrial fibrillation, persistent     Amputated toe of left foot     S/P CABG x 2     Amputated toe of right foot     H/O aortic valve replacement with porcine valve     Type 2 diabetes mellitus with circulatory disorder, without long-term current use of insulin     SHASTA (obstructive sleep apnea)     Essential hypertension      Plan: eventual upper endoscopy ideally when off plavix about 5 days if possible       VTE Prophylaxis:  Pharmacologic & mechanical VTE prophylaxis orders are present.        CODE STATUS:    Code Status (Patient has no pulse and is not breathing): CPR (Attempt to Resuscitate)  Medical Interventions (Patient has pulse or is breathing): Full    Disposition:  I expect patient to be discharged uncertain .    Darrian Webb MD

## 2025-05-23 NOTE — PROGRESS NOTES
Name: Rock Maciel Jr. ADMIT: 2025   : 1945  PCP: Denise Dickson APRN    MRN: [id number] LOS: 2 days   AGE/SEX: 79 y.o. male  ROOM: [facility]     Subjective   Subjective   Patient is lying in the bed and does not appear to be in major distress.  Denies nausea, vomiting abdominal pain, chest pain.       Objective   Objective   Vital Signs  Temp:  [97.5 °F (36.4 °C)-98.2 °F (36.8 °C)] 98 °F (36.7 °C)  Heart Rate:  [59-73] 63  Resp:  [18] 18  BP: (126-169)/(44-69) 151/53  SpO2:  [94 %-100 %] 94 %  on  Flow (L/min) (Oxygen Therapy):  [2] 2;   Device (Oxygen Therapy): nasal cannula  Body mass index is 38.11 kg/m².  Physical Exam   HEENT: PERRLA, extract movements intact, Scleras no icterus  Neck: Supple, no JVD  Cardiovascular: Regular rate and rhythm with normal S1 and S2  Respiratory: Fairly clear to auscultation anteriorly with no wheezes  GI: Soft, nontender, bowel sounds are present  Extremities: No edema, palpable pedal pulses  Neurologic: Grossly nonfocal, no facial asymmetry      Results Review     I reviewed the patient's new clinical results.  Results from last 7 days   Lab Units 25  0533 25  1604 25  0535 25  2157 25  1303   WBC 10*3/mm3 6.04  --  5.56  --  5.94   HEMOGLOBIN g/dL 9.1* 7.6* 6.6* 7.1* 6.6*   PLATELETS 10*3/mm3 222  --  223  --  294     Results from last 7 days   Lab Units 25  0533 25  0535 25  1303   SODIUM mmol/L 134* 135* 134*   POTASSIUM mmol/L 4.2 3.5 3.9   CHLORIDE mmol/L 98 96* 94*   CO2 mmol/L 28.0 30.0* 32.0*   BUN mg/dL 21 30* 27*   CREATININE mg/dL 2.63* 3.60* 2.89*   GLUCOSE mg/dL 109* 50* 110*   EGFR mL/min/1.73 23.8* 16.4* 21.3*     Results from last 7 days   Lab Units 25  0533 25  0535 25  1303   ALBUMIN g/dL 2.9* 2.6* 2.9*   BILIRUBIN mg/dL 0.4 0.4 0.3   ALK PHOS U/L 96 77 92   AST (SGOT) U/L 13 13 12   ALT (SGPT) U/L 10 9 9     Results from last 7 days   Lab Units 25  0533 25  0535  05/21/25  1303   CALCIUM mg/dL 8.3* 8.2* 8.5*   ALBUMIN g/dL 2.9* 2.6* 2.9*   MAGNESIUM mg/dL 2.0 2.0  --    PHOSPHORUS mg/dL 3.7  --   --        Glucose   Date/Time Value Ref Range Status   05/23/2025 1545 156 (H) 70 - 130 mg/dL Final   05/23/2025 1047 85 70 - 130 mg/dL Final   05/23/2025 0605 104 70 - 130 mg/dL Final   05/22/2025 2009 163 (H) 70 - 130 mg/dL Final   05/22/2025 1612 138 (H) 70 - 130 mg/dL Final   05/22/2025 1103 169 (H) 70 - 130 mg/dL Final   05/22/2025 0701 88 70 - 130 mg/dL Final       No radiology results for the last day    I have personally reviewed all medications:  Scheduled Medications  amiodarone, 200 mg, Oral, Daily  [Held by provider] apixaban, 2.5 mg, Oral, BID  vitamin C, 250 mg, Oral, Daily  atorvastatin, 20 mg, Oral, Nightly  bumetanide, 2 mg, Oral, TID  cetaphil, , Topical, Q12H  clobetasol propionate, 1 Application, Topical, Q12H  [Held by provider] clopidogrel, 75 mg, Oral, Daily  epoetin vince/vince-epbx, 10,000 Units, Subcutaneous, Once per day on Monday Wednesday Friday  [Held by provider] glipizide, 2.5 mg, Oral, BID AC  hydrALAZINE, 10 mg, Oral, TID  insulin lispro, 2-7 Units, Subcutaneous, 4x Daily AC & at Bedtime  [Held by provider] linagliptin, 5 mg, Oral, Daily  Menthol-Zinc Oxide, 1 Application, Topical, BID  metoprolol succinate XL, 25 mg, Oral, Daily  miconazole, 1 Application, Topical, Q12H  pantoprazole, 40 mg, Intravenous, BID AC  sevelamer, 800 mg, Oral, TID With Meals  sodium chloride, 10 mL, Intravenous, Q12H  sodium chloride, 10 mL, Intravenous, Q12H  sodium chloride, 10 mL, Intravenous, Q12H  sodium hypochlorite, , Topical, BID  tamsulosin, 0.4 mg, Oral, Daily    Infusions   Diet  Diet: Regular/House; Fluid Consistency: Thin (IDDSI 0)  NPO Diet NPO Type: Strict NPO    I have personally reviewed:  [x]  Laboratory   [x]  Microbiology   [x]  Radiology   [x]  EKG/Telemetry  [x]  Cardiology/Vascular   []  Pathology    []  Records       Assessment/Plan     Active  Hospital Problems    Diagnosis  POA    **Anemia [D64.9]  Yes    Moderate protein-calorie malnutrition [E44.0]  Yes    GI bleed [K92.2]  Yes    Peritoneal dialysis status [Z99.2]  Not Applicable    Hypoalbuminemia [E88.09]  Yes    Chronic heart failure with preserved ejection fraction (HFpEF) [I50.32]  Yes    Unsteady gait when walking [R26.81]  Yes    Nocturnal hypoxemia [G47.34]  Yes    CKD (chronic kidney disease) stage 4, GFR 15-29 ml/min [N18.4]  Yes    Persistent atrial fibrillation [I48.19]  Yes    Atrial fibrillation, persistent [I48.19]  Yes    Amputated toe of left foot [S98.132A]  Yes    S/P CABG x 2 [Z95.1]  Not Applicable    Amputated toe of right foot [S98.131A]  Yes    H/O aortic valve replacement with porcine valve [Z95.3]  Not Applicable    Type 2 diabetes mellitus with circulatory disorder, without long-term current use of insulin [E11.59]  Yes    SHASTA (obstructive sleep apnea) [G47.33]  Yes    Essential hypertension [I10]  Yes      Resolved Hospital Problems   No resolved problems to display.       79 y.o. male admitted with Anemia.    1. Anemia/hematuria, urology reevaluated and will undergo CBI.  Hemoglobin initially was 6.6 and received total of 2 units of PRBC and is improved to 9.1.  Urology is following along and given improvement in hematuria cystoscopy is being planned as an outpatient basis.  2.  ESRD, nephrology did evaluate and will continue with hemodialysis Tuesday Thursday and Saturday.  3.  Chronic atrial fibrillation, Plavix and Eliquis is on hold and will continue with amiodarone.  Continue with metoprolol as well.  Resume Plavix/Eliquis once cleared by urology.  4.  Chronic diastolic heart failure, on metoprolol and Bumex which will be continued.  5.  CAD/CABG/history of aortic valve replacement with porcine valve  6.  Right thigh contact rash  7.  Hypertension, on hydralazine, metoprolol.  8.  Diabetes mellitus, on corrective dose insulin which will be continued for the  moment.  9.  History of BPH, on terazosin.  10.  Obesity with BMI of 31.86, counseled to lose weight.  11.  Lower right lower extremity ulcer, podiatry did evaluate and no evidence of any infection and recommended to continue local wound care.  Wound VAC is being discontinued and wet-to-dry dressings will be continued.  Nonweightbearing on right lower extremity until cleared by podiatry.        Wesley Christianson MD  [facility]  05/23/25  17:03 EDT

## 2025-05-23 NOTE — PROGRESS NOTES
LOS: 2 days   Patient Care Team:  Denise Dickson APRN as PCP - General (Nurse Practitioner)  Azam Banegas Jr., MD as Consulting Physician (Cardiology)  Jamil Stevens MD as Consulting Physician (Nephrology)    Chief Complaint:  Hematuria.      Subjective     Interval History:     Urine clear on CBI. No pain.      Review of Systems:    All systems were reviewed and negative except for:  Genitourinary: postivie for  difficulty / inability to void    Objective     Vital Signs  Temp:  [97.5 °F (36.4 °C)-98.1 °F (36.7 °C)] 98.1 °F (36.7 °C)  Heart Rate:  [59-75] 66  Resp:  [16-20] 18  BP: (124-168)/(44-69) 126/62    Physical Exam:   No exam performed today,     Results Review:     I reviewed the patient's new clinical results.    Medication Review:     Assessment & Plan       Anemia    Essential hypertension    Type 2 diabetes mellitus with circulatory disorder, without long-term current use of insulin    SHASTA (obstructive sleep apnea)    H/O aortic valve replacement with porcine valve    Amputated toe of right foot    S/P CABG x 2    Amputated toe of left foot    Atrial fibrillation, persistent    Persistent atrial fibrillation    CKD (chronic kidney disease) stage 4, GFR 15-29 ml/min    Nocturnal hypoxemia    Unsteady gait when walking    Chronic heart failure with preserved ejection fraction (HFpEF)    GI bleed    Peritoneal dialysis status    Hypoalbuminemia    Moderate protein-calorie malnutrition      Hematuria - resolved. Hold CBI.   Plan to cap third port today if urine remains clear. No cysto needed today. OK to eat.  OK to restart anticoagulation therapy 5/24 if urine remains clear.      Urinary retention. Start Flomax.  Cont catheter on d/c. Will need VT and cysto as outpt.      Plan for disposition:    Juan A Gutierrez MD  05/23/25  07:09 EDT      Time:

## 2025-05-23 NOTE — PLAN OF CARE
Goal Outcome Evaluation:  Plan of Care Reviewed With: patient           Outcome Evaluation: Pt. is an 79 year old Male admitted to the hospital with anemia.  Pt. has been at a SNF for rehab since March 2025 following a fall (Left Clavicle Fx).  Pt. also underwent Right foot I&D (NWB RLE).  Pt. himself reports that he is NWB RLE and has to use a K.I. for LLE due to a meniscus tear.  Pt. reports that at rehab they were using a Lift to transfer him to/from a W/C for mobility but working with therapy on overall strength.  Pt. currently presents with decreased strength, decreased balance, decreased ROM, and decreased tolerance to functional activity. This AM, pt. requires Mod. assist x 1 for bed mobility. Once sitting EOB, pt. requires CGA-Min. assist x 1 for static sitting balance and Min. assist x 1 for dynamic sitting balance (Posterior lean throughout).  BLE ther. ex. program x 5 reps completed for general strengthening. Pt. will benefit from skilled inpt. P.T. to address his functional deficits and to assist pt. in regaining his maximum level of independence with functional mobility.    Anticipated Discharge Disposition (PT): skilled nursing facility

## 2025-05-23 NOTE — NURSING NOTE
Wound/ostomy - chart reviewed, Dr. Mchugh (podiatry) saw the patient, he has recommended to d/c the wound vac and continue with wet to dry dressing changes. Currently 1/4 st [facility] wet to dry is ordered 2 times daily, this can be continued at SNF upon d/c.

## 2025-05-23 NOTE — CONSULTS
Podiatry Consult Note      Patient: Rock Maciel Jr. Admit Date: 05/21/2025    Age: 79 y.o.   PCP: Denise Dickson APRN    MRN: [id number]  Room: E657/1        Subjective     Chief Complaint     Chief Complaint   Patient presents with    Abnormal Lab        HPI     This is an 79-year-old male who underwent a right lateral foot excisional debridement 10 weeks ago by myself.  Patient has been placed in rehab since he underwent surgery and they have had a wound VAC in place.  Patient states that his rehab center has not been the best with the wound VAC but it has maintained most of the time.  He presents to the hospital for blood in his urine have been asked to see him for evaluation of his right foot ulcer.  He  has not been able to follow-up with me outpatient since his procedure due to him being in rehab.    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  Added automatically from request for surgery [id number]      Gout     H/O aortic valve replacement with porcine valve 09/10/2018    Hiatal  hernia     History of MRSA infection     RIGHT FOOT 2010    Hyperlipidemia     Hypertension     LAFB (left anterior fascicular block) 05/17/2016    MRSA (methicillin resistant Staphylococcus aureus) infection 03/08/2025    Murmur     Neuropathic arthropathy 05/17/2016    Overview:   2015 IMO UPDATE  Overview:   2015 IMO UPDATE      Nocturnal hypoxemia 12/01/2024    Nonrheumatic aortic valve stenosis 01/30/2017    Persistent atrial fibrillation 11/07/2024    Pneumothorax on right     S/P CABG x 2 05/06/2019 July 2018      Sleep apnea     pt wears CPAP at night    Status post left inguinal hernia repair, follow-up exam 01/05/2025    Traumatic rhabdomyolysis 03/03/2025    Type 2 diabetes mellitus with circulatory disorder, without long-term current use of insulin 05/17/2016    Unsteady gait when walking 01/05/2025    Wellness examination 06/24/2015    Annual Wellness Visit        Past Surgical History:   Procedure Laterality Date    ARTERY SURGERY Bilateral     carotid    BONE EXCISION LEG Right 3/10/2025    Procedure: BONE EXCISION LOWER EXTERMITY, RIGHT;  Surgeon: Jimenez Mchugh DPM;  Location: [facility] OR;  Service: Podiatry;  Laterality: Right;    CARDIAC CATHETERIZATION N/A 7/20/2018    Procedure: Left Heart Cath;  Surgeon: Jo Toney MD;  Location: [facility] CATH INVASIVE LOCATION;  Service: Cardiovascular    CARDIAC CATHETERIZATION N/A 7/20/2018    Procedure: Coronary angiography;  Surgeon: Jo Toney MD;  Location: [facility] INVASIVE LOCATION;  Service: Cardiovascular    CAROTID ENDARTERECTOMY Bilateral     COLONOSCOPY  2008    CORONARY ARTERY BYPASS GRAFT WITH AORTIC VALVE REPAIR/REPLACEMENT N/A 7/23/2018    Procedure: INTRAOPERATIVE ALEX, MIDLINE STERNOTOMY, CORONARY ARTERY BYPASS GRAFTING X 3 USING ENDOSCOPICALLY HARVESTED LEFT GREATER SAPHENOUS VEIN,  AORTIC VALVE REPLACEMENT USING 25MM LOPEZ II ULTRA PORCINE VALVE, PRP;  Surgeon: Phong Posey MD;  Location: [facility]  OR;  Service: Cardiothoracic    FOOT SURGERY Right 2010    5th digit removal    FOOT SURGERY Left 2011    1 digit removed    INCISION AND DRAINAGE LEG Right 3/28/2020    Procedure: DEBRIDMENT OF RIGHT CALF;  Surgeon: Ross Pineda MD;  Location: [facility] MAIN OR;  Service: Vascular;  Laterality: Right;    INCISION AND DRAINAGE LEG Right 3/10/2025    Procedure: INCISION AND DRAINAGE LOWER EXTREMITY;  Surgeon: Jimenez Mchugh DPM;  Location: [facility] MAIN OR;  Service: Podiatry;  Laterality: Right;    INGUINAL HERNIA REPAIR Left 11/29/2024    Procedure: INGUINAL HERNIA REPAIR LAPAROSCOPIC WITH DAVINCI ROBOT;  Surgeon: Phong Lazo MD;  Location: [facility] MAIN OR;  Service: Robotics - DaVinci;  Laterality: Left;    INSERTION HEMODIALYSIS CATHETER N/A 3/11/2025    Procedure: HEMODIALYSIS CATHETER INSERTION;  Surgeon: Jeaneth Bonds MD;  Location: [facility] MAIN OR;  Service: Vascular;  Laterality: N/A;    QUADRICEPS TENDON REPAIR Left 5/14/2019    Procedure: Left QUADRICEPS TENDON REPAIR;  Surgeon: Camilo Hunter MD;  Location: [facility] OR;  Service: Orthopedics    THORACENTESIS Right 11/21/2016    THORACOSCOPY Right 5/8/2017    Procedure: BRONCHOSCOPY, RIGHT VAT,  TOTAL DECORTICATION RIGHT LUNG, PLEURAL BX, PLACEMENT SUBPLEURAL PAIN CAATHETERS X2;  Surgeon: Donald Orlando III, MD;  Location: [facility] OR;  Service:     TONSILECTOMY, ADENOIDECTOMY, BILATERAL MYRINGOTOMY AND TUBES      WOUND DEBRIDEMENT Right 3/13/2025    Procedure: DEBRIDEMENT FOOT, RIGHT;  Surgeon: Jimenez Mchugh DPM;  Location: [facility] OR;  Service: Podiatry;  Laterality: Right;        Allergies   Allergen Reactions    Ceclor [Cefaclor] Rash     Rash in his 50s; he tolerated piperacillin-tazobactam in March 2020        Social History     Tobacco Use   Smoking Status Never    Passive exposure: Never   Smokeless Tobacco Never        Objective   Physical Exam    Vitals:    05/23/25 0732   BP: 169/57   Pulse: 73    Resp: 18   Temp: 98.2 °F (36.8 °C)   SpO2:           Right foot healthy granular base at the plantar lateral aspect of the midfoot.  On present granular base.  No purulence, no proximal streaking, no periwound erythema noted.    Labs     Lab Results   Component Value Date    HGBA1C 6.00 (H) 03/03/2025    POCGLU 104 05/23/2025    SEDRATE 24 (H) 05/21/2025        CBC:      Lab 05/23/25  0533 05/22/25  1604 05/22/25  0535 05/21/25  2157 05/21/25  1303   WBC 6.04  --  5.56  --  5.94   HEMOGLOBIN 9.1*   < > 6.6*   < > 6.6*   HEMATOCRIT 28.1*   < > 19.5*   < > 21.4*   PLATELETS 222  --  223  --  294   NEUTROS ABS 4.71  --  3.79  --  4.41   IMMATURE GRANS (ABS) 0.04  --  0.03  --  0.02   LYMPHS ABS 0.59*  --  0.78  --  0.59*   MONOS ABS 0.41  --  0.42  --  0.46   EOS ABS 0.26  --  0.52*  --  0.44*   MCV 91.8  --  92.0  --  98.2*    < > = values in this interval not displayed.          Results for orders placed or performed during the hospital encounter of 03/03/25   Blood Culture - Blood, Arm, Left    Specimen: Arm, Left; Blood   Result Value Ref Range    Blood Culture No growth at 5 days         XR Shoulder 2+ View Left  Narrative: 2 VIEWS LEFT SHOULDER     HISTORY: Pain     COMPARISON: March 3, 2025     FINDINGS:  Exam is extremely limited, due to poor patient positioning. I do not see  an obvious fracture of the proximal left humerus. The patient has a  chronic fracture of the distal left clavicle.     Impression: No new fractures are identified. Technically limited exam.     This report was finalized on 5/16/2025 1:53 AM by Dr. Inocencia Cruz M.D on Workstation: [id number]          Assessment/Plan       79-year-old male with diabetic foot ulcer right lateral midfoot    -patient examined and evaluated by myself  - Patient's ulcer is granular and healthy.  It is healing well and we will continue local wound care  - Okay to discontinue wound VAC and do wet-to-dry dressings daily  - Continue nonweightbearing to  right lower extremity  - Will attempt to get outpatient follow-up with my office if can get patient out of rehab      Jimenez Mchugh DPM  [facility]  Office: [phone]

## 2025-05-24 ENCOUNTER — INPATIENT HOSPITAL (OUTPATIENT)
Dept: URBAN - METROPOLITAN AREA HOSPITAL 113 | Facility: HOSPITAL | Age: 80
End: 2025-05-24
Payer: MEDICARE

## 2025-05-24 ENCOUNTER — ANESTHESIA EVENT (OUTPATIENT)
Dept: PERIOP | Facility: HOSPITAL | Age: 80
End: 2025-05-24
Payer: MEDICARE

## 2025-05-24 ENCOUNTER — ANESTHESIA (OUTPATIENT)
Dept: PERIOP | Facility: HOSPITAL | Age: 80
End: 2025-05-24
Payer: MEDICARE

## 2025-05-24 ENCOUNTER — APPOINTMENT (OUTPATIENT)
Dept: GENERAL RADIOLOGY | Facility: HOSPITAL | Age: 80
End: 2025-05-24
Payer: MEDICARE

## 2025-05-24 DIAGNOSIS — D64.9 ANEMIA, UNSPECIFIED: ICD-10-CM

## 2025-05-24 DIAGNOSIS — R19.5 OTHER FECAL ABNORMALITIES: ICD-10-CM

## 2025-05-24 DIAGNOSIS — E44.0 MODERATE PROTEIN-CALORIE MALNUTRITION: ICD-10-CM

## 2025-05-24 PROBLEM — D62 ACUTE BLOOD LOSS ANEMIA: Status: ACTIVE | Noted: 2025-05-21

## 2025-05-24 LAB
ALBUMIN SERPL-MCNC: 2.8 G/DL (ref 3.5–5.2)
ALBUMIN/GLOB SERPL: 0.7 G/DL
ALP SERPL-CCNC: 92 U/L (ref 39–117)
ALT SERPL W P-5'-P-CCNC: 10 U/L (ref 1–41)
ANION GAP SERPL CALCULATED.3IONS-SCNC: 8.1 MMOL/L (ref 5–15)
AST SERPL-CCNC: 12 U/L (ref 1–40)
BASOPHILS # BLD AUTO: 0.02 10*3/MM3 (ref 0–0.2)
BASOPHILS NFR BLD AUTO: 0.3 % (ref 0–1.5)
BILIRUB SERPL-MCNC: 0.3 MG/DL (ref 0–1.2)
BUN SERPL-MCNC: 30 MG/DL (ref 8–23)
BUN/CREAT SERPL: 9.3 (ref 7–25)
CALCIUM SPEC-SCNC: 8.4 MG/DL (ref 8.6–10.5)
CHLORIDE SERPL-SCNC: 99 MMOL/L (ref 98–107)
CO2 SERPL-SCNC: 27.9 MMOL/L (ref 22–29)
CREAT SERPL-MCNC: 3.22 MG/DL (ref 0.76–1.27)
DEPRECATED RDW RBC AUTO: 55.4 FL (ref 37–54)
EGFRCR SERPLBLD CKD-EPI 2021: 18.7 ML/MIN/1.73
EOSINOPHIL # BLD AUTO: 0.49 10*3/MM3 (ref 0–0.4)
EOSINOPHIL NFR BLD AUTO: 6.4 % (ref 0.3–6.2)
ERYTHROCYTE [DISTWIDTH] IN BLOOD BY AUTOMATED COUNT: 15.3 % (ref 12.3–15.4)
GLOBULIN UR ELPH-MCNC: 3.8 GM/DL
GLUCOSE BLDC GLUCOMTR-MCNC: 131 MG/DL (ref 70–130)
GLUCOSE BLDC GLUCOMTR-MCNC: 72 MG/DL (ref 70–130)
GLUCOSE BLDC GLUCOMTR-MCNC: 78 MG/DL (ref 70–130)
GLUCOSE BLDC GLUCOMTR-MCNC: 88 MG/DL (ref 70–130)
GLUCOSE SERPL-MCNC: 107 MG/DL (ref 65–99)
HCT VFR BLD AUTO: 29.4 % (ref 37.5–51)
HGB BLD-MCNC: 8.9 G/DL (ref 13–17.7)
IMM GRANULOCYTES # BLD AUTO: 0.05 10*3/MM3 (ref 0–0.05)
IMM GRANULOCYTES NFR BLD AUTO: 0.7 % (ref 0–0.5)
LYMPHOCYTES # BLD AUTO: 0.65 10*3/MM3 (ref 0.7–3.1)
LYMPHOCYTES NFR BLD AUTO: 8.5 % (ref 19.6–45.3)
MCH RBC QN AUTO: 29.9 PG (ref 26.6–33)
MCHC RBC AUTO-ENTMCNC: 30.3 G/DL (ref 31.5–35.7)
MCV RBC AUTO: 98.7 FL (ref 79–97)
MONOCYTES # BLD AUTO: 0.5 10*3/MM3 (ref 0.1–0.9)
MONOCYTES NFR BLD AUTO: 6.5 % (ref 5–12)
NEUTROPHILS NFR BLD AUTO: 5.97 10*3/MM3 (ref 1.7–7)
NEUTROPHILS NFR BLD AUTO: 77.6 % (ref 42.7–76)
NRBC BLD AUTO-RTO: 0 /100 WBC (ref 0–0.2)
PHOSPHATE SERPL-MCNC: 4.5 MG/DL (ref 2.5–4.5)
PLATELET # BLD AUTO: 208 10*3/MM3 (ref 140–450)
PMV BLD AUTO: 8.9 FL (ref 6–12)
POTASSIUM SERPL-SCNC: 4.3 MMOL/L (ref 3.5–5.2)
PROT SERPL-MCNC: 6.6 G/DL (ref 6–8.5)
QT INTERVAL: 457 MS
QTC INTERVAL: 476 MS
RBC # BLD AUTO: 2.98 10*6/MM3 (ref 4.14–5.8)
SODIUM SERPL-SCNC: 135 MMOL/L (ref 136–145)
WBC NRBC COR # BLD AUTO: 7.68 10*3/MM3 (ref 3.4–10.8)

## 2025-05-24 PROCEDURE — 80053 COMPREHEN METABOLIC PANEL: CPT | Performed by: INTERNAL MEDICINE

## 2025-05-24 PROCEDURE — 36415 COLL VENOUS BLD VENIPUNCTURE: CPT | Performed by: INTERNAL MEDICINE

## 2025-05-24 PROCEDURE — 25810000003 LACTATED RINGERS PER 1000 ML: Performed by: NURSE ANESTHETIST, CERTIFIED REGISTERED

## 2025-05-24 PROCEDURE — 3E1K78Z IRRIGATION OF GENITOURINARY TRACT USING IRRIGATING SUBSTANCE, VIA NATURAL OR ARTIFICIAL OPENING: ICD-10-PCS | Performed by: UROLOGY

## 2025-05-24 PROCEDURE — 88307 TISSUE EXAM BY PATHOLOGIST: CPT | Performed by: UROLOGY

## 2025-05-24 PROCEDURE — 82948 REAGENT STRIP/BLOOD GLUCOSE: CPT

## 2025-05-24 PROCEDURE — 85025 COMPLETE CBC W/AUTO DIFF WBC: CPT | Performed by: INTERNAL MEDICINE

## 2025-05-24 PROCEDURE — 25010000002 ONDANSETRON PER 1 MG: Performed by: NURSE ANESTHETIST, CERTIFIED REGISTERED

## 2025-05-24 PROCEDURE — 25010000002 CEFTRIAXONE PER 250 MG: Performed by: NURSE ANESTHETIST, CERTIFIED REGISTERED

## 2025-05-24 PROCEDURE — 0TCB8ZZ EXTIRPATION OF MATTER FROM BLADDER, VIA NATURAL OR ARTIFICIAL OPENING ENDOSCOPIC: ICD-10-PCS | Performed by: UROLOGY

## 2025-05-24 PROCEDURE — 84100 ASSAY OF PHOSPHORUS: CPT

## 2025-05-24 PROCEDURE — 25010000002 LIDOCAINE 2% SOLUTION: Performed by: NURSE ANESTHETIST, CERTIFIED REGISTERED

## 2025-05-24 PROCEDURE — 25010000002 PROPOFOL 10 MG/ML EMULSION: Performed by: NURSE ANESTHETIST, CERTIFIED REGISTERED

## 2025-05-24 PROCEDURE — 25010000002 CEFTRIAXONE PER 250 MG: Performed by: UROLOGY

## 2025-05-24 PROCEDURE — 0TBB8ZX EXCISION OF BLADDER, VIA NATURAL OR ARTIFICIAL OPENING ENDOSCOPIC, DIAGNOSTIC: ICD-10-PCS | Performed by: UROLOGY

## 2025-05-24 PROCEDURE — 99233 SBSQ HOSP IP/OBS HIGH 50: CPT

## 2025-05-24 RX ORDER — LIDOCAINE HYDROCHLORIDE 20 MG/ML
INJECTION, SOLUTION INFILTRATION; PERINEURAL AS NEEDED
Status: DISCONTINUED | OUTPATIENT
Start: 2025-05-24 | End: 2025-05-24 | Stop reason: SURG

## 2025-05-24 RX ORDER — SODIUM CHLORIDE 0.9 % (FLUSH) 0.9 %
3 SYRINGE (ML) INJECTION EVERY 12 HOURS SCHEDULED
Status: DISCONTINUED | OUTPATIENT
Start: 2025-05-24 | End: 2025-05-24 | Stop reason: HOSPADM

## 2025-05-24 RX ORDER — IPRATROPIUM BROMIDE AND ALBUTEROL SULFATE 2.5; .5 MG/3ML; MG/3ML
3 SOLUTION RESPIRATORY (INHALATION) ONCE AS NEEDED
Status: DISCONTINUED | OUTPATIENT
Start: 2025-05-24 | End: 2025-05-24 | Stop reason: HOSPADM

## 2025-05-24 RX ORDER — PROPOFOL 10 MG/ML
VIAL (ML) INTRAVENOUS AS NEEDED
Status: DISCONTINUED | OUTPATIENT
Start: 2025-05-24 | End: 2025-05-24 | Stop reason: SURG

## 2025-05-24 RX ORDER — ATROPINE SULFATE 0.4 MG/ML
0.4 INJECTION, SOLUTION INTRAMUSCULAR; INTRAVENOUS; SUBCUTANEOUS ONCE AS NEEDED
Status: DISCONTINUED | OUTPATIENT
Start: 2025-05-24 | End: 2025-05-24 | Stop reason: HOSPADM

## 2025-05-24 RX ORDER — DROPERIDOL 2.5 MG/ML
0.62 INJECTION, SOLUTION INTRAMUSCULAR; INTRAVENOUS
Status: DISCONTINUED | OUTPATIENT
Start: 2025-05-24 | End: 2025-05-24 | Stop reason: HOSPADM

## 2025-05-24 RX ORDER — SUCCINYLCHOLINE/SOD CL,ISO/PF 200MG/10ML
SYRINGE (ML) INTRAVENOUS AS NEEDED
Status: DISCONTINUED | OUTPATIENT
Start: 2025-05-24 | End: 2025-05-24 | Stop reason: SURG

## 2025-05-24 RX ORDER — ONDANSETRON 2 MG/ML
4 INJECTION INTRAMUSCULAR; INTRAVENOUS ONCE AS NEEDED
Status: DISCONTINUED | OUTPATIENT
Start: 2025-05-24 | End: 2025-05-24 | Stop reason: HOSPADM

## 2025-05-24 RX ORDER — SODIUM CHLORIDE, SODIUM LACTATE, POTASSIUM CHLORIDE, CALCIUM CHLORIDE 600; 310; 30; 20 MG/100ML; MG/100ML; MG/100ML; MG/100ML
INJECTION, SOLUTION INTRAVENOUS CONTINUOUS PRN
Status: DISCONTINUED | OUTPATIENT
Start: 2025-05-24 | End: 2025-05-24 | Stop reason: SURG

## 2025-05-24 RX ORDER — SODIUM CHLORIDE, SODIUM LACTATE, POTASSIUM CHLORIDE, CALCIUM CHLORIDE 600; 310; 30; 20 MG/100ML; MG/100ML; MG/100ML; MG/100ML
9 INJECTION, SOLUTION INTRAVENOUS CONTINUOUS
Status: DISCONTINUED | OUTPATIENT
Start: 2025-05-24 | End: 2025-05-24

## 2025-05-24 RX ORDER — FLUMAZENIL 0.1 MG/ML
0.2 INJECTION INTRAVENOUS AS NEEDED
Status: DISCONTINUED | OUTPATIENT
Start: 2025-05-24 | End: 2025-05-24 | Stop reason: HOSPADM

## 2025-05-24 RX ORDER — HYDROCODONE BITARTRATE AND ACETAMINOPHEN 7.5; 325 MG/1; MG/1
1 TABLET ORAL EVERY 4 HOURS PRN
Status: DISCONTINUED | OUTPATIENT
Start: 2025-05-24 | End: 2025-05-24 | Stop reason: HOSPADM

## 2025-05-24 RX ORDER — LABETALOL HYDROCHLORIDE 5 MG/ML
5 INJECTION, SOLUTION INTRAVENOUS
Status: DISCONTINUED | OUTPATIENT
Start: 2025-05-24 | End: 2025-05-24 | Stop reason: HOSPADM

## 2025-05-24 RX ORDER — PROMETHAZINE HYDROCHLORIDE 25 MG/1
25 TABLET ORAL ONCE AS NEEDED
Status: DISCONTINUED | OUTPATIENT
Start: 2025-05-24 | End: 2025-05-24 | Stop reason: HOSPADM

## 2025-05-24 RX ORDER — CEFTRIAXONE 1 G/1
INJECTION, POWDER, FOR SOLUTION INTRAMUSCULAR; INTRAVENOUS AS NEEDED
Status: DISCONTINUED | OUTPATIENT
Start: 2025-05-24 | End: 2025-05-24 | Stop reason: SURG

## 2025-05-24 RX ORDER — PROMETHAZINE HYDROCHLORIDE 25 MG/1
25 SUPPOSITORY RECTAL ONCE AS NEEDED
Status: DISCONTINUED | OUTPATIENT
Start: 2025-05-24 | End: 2025-05-24 | Stop reason: HOSPADM

## 2025-05-24 RX ORDER — HYDRALAZINE HYDROCHLORIDE 20 MG/ML
5 INJECTION INTRAMUSCULAR; INTRAVENOUS
Status: DISCONTINUED | OUTPATIENT
Start: 2025-05-24 | End: 2025-05-24 | Stop reason: HOSPADM

## 2025-05-24 RX ORDER — HYDROMORPHONE HYDROCHLORIDE 1 MG/ML
0.25 INJECTION, SOLUTION INTRAMUSCULAR; INTRAVENOUS; SUBCUTANEOUS
Status: DISCONTINUED | OUTPATIENT
Start: 2025-05-24 | End: 2025-05-24 | Stop reason: HOSPADM

## 2025-05-24 RX ORDER — ONDANSETRON 2 MG/ML
INJECTION INTRAMUSCULAR; INTRAVENOUS AS NEEDED
Status: DISCONTINUED | OUTPATIENT
Start: 2025-05-24 | End: 2025-05-24 | Stop reason: SURG

## 2025-05-24 RX ORDER — NALOXONE HCL 0.4 MG/ML
0.2 VIAL (ML) INJECTION AS NEEDED
Status: DISCONTINUED | OUTPATIENT
Start: 2025-05-24 | End: 2025-05-24 | Stop reason: HOSPADM

## 2025-05-24 RX ORDER — HYDROCODONE BITARTRATE AND ACETAMINOPHEN 5; 325 MG/1; MG/1
1 TABLET ORAL ONCE AS NEEDED
Status: DISCONTINUED | OUTPATIENT
Start: 2025-05-24 | End: 2025-05-24 | Stop reason: HOSPADM

## 2025-05-24 RX ORDER — BUPIVACAINE HCL/0.9 % NACL/PF 0.125 %
PLASTIC BAG, INJECTION (ML) EPIDURAL AS NEEDED
Status: DISCONTINUED | OUTPATIENT
Start: 2025-05-24 | End: 2025-05-24 | Stop reason: SURG

## 2025-05-24 RX ORDER — DIPHENHYDRAMINE HYDROCHLORIDE 50 MG/ML
12.5 INJECTION, SOLUTION INTRAMUSCULAR; INTRAVENOUS
Status: DISCONTINUED | OUTPATIENT
Start: 2025-05-24 | End: 2025-05-24 | Stop reason: HOSPADM

## 2025-05-24 RX ORDER — EPHEDRINE SULFATE 50 MG/ML
5 INJECTION, SOLUTION INTRAVENOUS ONCE AS NEEDED
Status: DISCONTINUED | OUTPATIENT
Start: 2025-05-24 | End: 2025-05-24 | Stop reason: HOSPADM

## 2025-05-24 RX ORDER — SODIUM CHLORIDE 0.9 % (FLUSH) 0.9 %
3-10 SYRINGE (ML) INJECTION AS NEEDED
Status: DISCONTINUED | OUTPATIENT
Start: 2025-05-24 | End: 2025-05-24 | Stop reason: HOSPADM

## 2025-05-24 RX ORDER — FENTANYL CITRATE 50 UG/ML
25 INJECTION, SOLUTION INTRAMUSCULAR; INTRAVENOUS
Status: DISCONTINUED | OUTPATIENT
Start: 2025-05-24 | End: 2025-05-24 | Stop reason: HOSPADM

## 2025-05-24 RX ORDER — LIDOCAINE HYDROCHLORIDE 10 MG/ML
0.5 INJECTION, SOLUTION INFILTRATION; PERINEURAL ONCE AS NEEDED
Status: DISCONTINUED | OUTPATIENT
Start: 2025-05-24 | End: 2025-05-24 | Stop reason: HOSPADM

## 2025-05-24 RX ORDER — EPHEDRINE SULFATE 50 MG/ML
INJECTION INTRAVENOUS AS NEEDED
Status: DISCONTINUED | OUTPATIENT
Start: 2025-05-24 | End: 2025-05-24 | Stop reason: SURG

## 2025-05-24 RX ADMIN — Medication 10 ML: at 22:12

## 2025-05-24 RX ADMIN — EPHEDRINE SULFATE 10 MG: 50 INJECTION INTRAVENOUS at 18:17

## 2025-05-24 RX ADMIN — LIDOCAINE HYDROCHLORIDE 40 MG: 20 INJECTION, SOLUTION INFILTRATION; PERINEURAL at 17:57

## 2025-05-24 RX ADMIN — CAMPHOR, MENTHOL: .5; .5 LOTION TOPICAL at 22:14

## 2025-05-24 RX ADMIN — OXYCODONE AND ACETAMINOPHEN 1 TABLET: 5; 325 TABLET ORAL at 02:39

## 2025-05-24 RX ADMIN — BUMETANIDE 2 MG: 1 TABLET ORAL at 06:39

## 2025-05-24 RX ADMIN — Medication 200 MCG: at 18:24

## 2025-05-24 RX ADMIN — CAMPHOR, MENTHOL: .5; .5 LOTION TOPICAL at 22:15

## 2025-05-24 RX ADMIN — CEFTRIAXONE 2000 MG: 2 INJECTION, POWDER, FOR SOLUTION INTRAMUSCULAR; INTRAVENOUS at 22:10

## 2025-05-24 RX ADMIN — ONDANSETRON 4 MG: 2 INJECTION, SOLUTION INTRAMUSCULAR; INTRAVENOUS at 18:28

## 2025-05-24 RX ADMIN — Medication: at 22:15

## 2025-05-24 RX ADMIN — PROPOFOL 100 MG: 10 INJECTION, EMULSION INTRAVENOUS at 17:57

## 2025-05-24 RX ADMIN — ANTI-FUNGAL POWDER MICONAZOLE NITRATE TALC FREE 1 APPLICATION: 1.42 POWDER TOPICAL at 22:15

## 2025-05-24 RX ADMIN — METOPROLOL SUCCINATE 25 MG: 25 TABLET, EXTENDED RELEASE ORAL at 14:35

## 2025-05-24 RX ADMIN — SODIUM CHLORIDE, SODIUM LACTATE, POTASSIUM CHLORIDE, AND CALCIUM CHLORIDE: 600; 310; 30; 20 INJECTION, SOLUTION INTRAVENOUS at 17:49

## 2025-05-24 RX ADMIN — Medication 120 MG: at 17:57

## 2025-05-24 RX ADMIN — EPHEDRINE SULFATE 10 MG: 50 INJECTION INTRAVENOUS at 18:24

## 2025-05-24 RX ADMIN — Medication 200 MCG: at 18:13

## 2025-05-24 RX ADMIN — Medication 200 MCG: at 18:06

## 2025-05-24 RX ADMIN — Medication 200 MCG: at 18:17

## 2025-05-24 RX ADMIN — DAKIN'S SOLUTION 0.125% (QUARTER STRENGTH): 0.12 SOLUTION at 22:11

## 2025-05-24 RX ADMIN — MENTHOL, ZINC OXIDE 1 APPLICATION: .44; 20.6 OINTMENT TOPICAL at 22:15

## 2025-05-24 RX ADMIN — CEFTRIAXONE 2 G: 2 INJECTION, POWDER, FOR SOLUTION INTRAMUSCULAR; INTRAVENOUS at 18:15

## 2025-05-24 RX ADMIN — CLOBETASOL PROPIONATE CREAM USP, 0.05% 1 APPLICATION: 0.5 CREAM TOPICAL at 22:11

## 2025-05-24 RX ADMIN — Medication 200 MCG: at 18:10

## 2025-05-24 RX ADMIN — ATORVASTATIN CALCIUM 20 MG: 20 TABLET, FILM COATED ORAL at 22:10

## 2025-05-24 RX ADMIN — HYDRALAZINE HYDROCHLORIDE 10 MG: 10 TABLET ORAL at 22:11

## 2025-05-24 NOTE — ANESTHESIA POSTPROCEDURE EVALUATION
Patient: Rock Maciel Jr.    Procedure Summary       Date: 05/24/25 Room / Location: [facility] OR 01 / [facility] MAIN OR    Anesthesia Start: 1749 Anesthesia Stop: 1844    Procedure: CYSTOSCOPY WITH CLOT EVACUATION, RESECTION OF BLADDER MASS AND PLACEMENT OF CONTINOUS BLADDER IRRIGATION CATHETER (Urethra) Diagnosis:       Acute blood loss anemia      (Acute blood loss anemia [D62])    Surgeons: Prosper Cornejo MD Provider: Charbel Gaviria MD    Anesthesia Type: general ASA Status: 3            Anesthesia Type: general    Vitals  Vitals Value Taken Time   /52 05/24/25 19:15   Temp 37.1 °C (98.8 °F) 05/24/25 18:39   Pulse 93 05/24/25 19:26   Resp 16 05/24/25 19:15   SpO2 96 % 05/24/25 19:26   Vitals shown include unfiled device data.        Post Anesthesia Care and Evaluation    Patient location during evaluation: bedside  Patient participation: complete - patient participated  Level of consciousness: awake  Pain management: adequate    Airway patency: patent  Anesthetic complications: No anesthetic complications  PONV Status: controlled  Cardiovascular status: acceptable  Respiratory status: acceptable  Hydration status: acceptable    Comments: --------------------            05/24/25 1915     --------------------   BP:       130/52     Pulse:     102       Resp:       16       Temp:                SpO2:      97%      --------------------

## 2025-05-24 NOTE — PROGRESS NOTES
[facility]     Progress Note    Patient Name: Rock Maciel Jr.  : 1945  MRN: [id number]  Primary Care Physician:  Denise Dickson APRN  Date of admission: 2025    Subjective   Subjective     Chief Complaint: anemia, Heme + stools     History of Present Illness  Patient Reports feels much better. With blood   some blood in the urine     Review of Systems   Musculoskeletal:  Positive for arthralgias.   Neurological:  Positive for weakness.       Objective   Objective     Vitals:   Temp:  [97.3 °F (36.3 °C)-98 °F (36.7 °C)] 97.6 °F (36.4 °C)  Heart Rate:  [63-70] 65  Resp:  [16-18] 16  BP: (125-153)/(46-82) 153/63  Flow (L/min) (Oxygen Therapy):  [2] 2    Physical Exam  HENT:      Right Ear: External ear normal.      Left Ear: External ear normal.      Mouth/Throat:      Pharynx: Oropharynx is clear.   Eyes:      Conjunctiva/sclera: Conjunctivae normal.   Cardiovascular:      Rate and Rhythm: Normal rate.      Pulses: Normal pulses.   Pulmonary:      Effort: Pulmonary effort is normal.   Abdominal:      General: Abdomen is flat.   Skin:     General: Skin is warm.   Neurological:      General: No focal deficit present.      Mental Status: He is alert.   Psychiatric:         Mood and Affect: Mood normal.          Result Review    Result Review:  I have personally reviewed the results from the time of this admission to 2025 12:07 EDT and agree with these findings:  []  Laboratory list / accordion  []  Microbiology  []  Radiology  []  EKG/Telemetry   []  Cardiology/Vascular   []  Pathology  []  Old records  []  Other:  Most notable findings include:       Assessment & Plan   Assessment / Plan     Brief Patient Summary:  Rock Maciel Jr. is a 79 y.o. male who   Anemia from chronic disease  Heme Positive stool     Active Hospital Problems:  Active Hospital Problems    Diagnosis     **Anemia     Moderate protein-calorie malnutrition     GI bleed     Peritoneal dialysis status      Hypoalbuminemia     Chronic heart failure with preserved ejection fraction (HFpEF)     Unsteady gait when walking     Nocturnal hypoxemia     CKD (chronic kidney disease) stage 4, GFR 15-29 ml/min     Persistent atrial fibrillation     Atrial fibrillation, persistent     Amputated toe of left foot     S/P CABG x 2     Amputated toe of right foot     H/O aortic valve replacement with porcine valve     Type 2 diabetes mellitus with circulatory disorder, without long-term current use of insulin     SHASTA (obstructive sleep apnea)     Essential hypertension      Plan: Upper tract endoscopy tomorrow , risks, alternatives and benefits discussed with patient and patient is agreeable to proceed      VTE Prophylaxis:  Pharmacologic & mechanical VTE prophylaxis orders are present.        CODE STATUS:    Code Status (Patient has no pulse and is not breathing): CPR (Attempt to Resuscitate)  Medical Interventions (Patient has pulse or is breathing): Full    Disposition:  I expect patient to be discharged soon .    Darrian Webb MD

## 2025-05-24 NOTE — NURSING NOTE
hd without incident. tolerated well. removed 1 l. no meds administered. avf needles removed x 2. hemostasis achieved. stable at baseline post completion of hd.

## 2025-05-24 NOTE — OP NOTE
CYSTOSCOPY WITH CLOT EVACUATION  Procedure Note    Rock Benitoelisha Burgos  5/24/2025    Pre-op Diagnosis:   Acute blood loss anemia [D62]    Post-op Diagnosis:     Post-Op Diagnosis Codes:     * Acute blood loss anemia [D62]    Procedure(s):  CYSTOSCOPY WITH CLOT EVACUATION, RESECTION OF BLADDER MASS AND PLACEMENT OF CONTINOUS BLADDER IRRIGATION CATHETER    Surgeon(s):  Prosper Cornejo MD    Anesthesia: General    Staff:   Circulator: Juan Tobin RN  Scrub Person: Elizabeth Dawson    Estimated Blood Loss: 100ml    Specimens:                Order Name Source Comment Collection Info Order Time   BASIC METABOLIC PANEL    5/24/2025  3:00 PM     Release to patient   Routine Release        TISSUE PATHOLOGY EXAM Urinary Bladder  Collected By: Prosper Cornejo MD 5/24/2025  6:33 PM     Release to patient   Routine Release              Drains:   Urethral Catheter 22 Fr. (Active)       [REMOVED] Urethral Catheter Silicone 16 Fr. (Removed)       [REMOVED] Urethral Catheter Silicone 16 Fr. (Removed)       [REMOVED] Urethral Catheter Coude;Other (Comment) 22 Fr. (Removed)   Daily Indications Acute Obstruction 05/22/25 1001   Catheter care complete Yes 05/22/25 2136   Output (mL) 300 mL 05/22/25 1427       [REMOVED] Continuous Bladder Irrigation Triple-lumen 22 Fr (Removed)   Daily Indications Acute Urinary Retention 05/24/25 1405   Site Assessment Clean;Skin intact 05/24/25 1405   Collection Container Other (Comment) 05/22/25 2010   Securement Method Securing device 05/24/25 1405   Catheter care complete Yes 05/24/25 1417   CBI Urine Appearance light;pink 05/24/25 1700   Rate Fast 05/22/25 2100   Irrigant Normal saline 05/24/25 1405   CBI Irrigation Intake (mL) 6000 mL 05/23/25 2112   CBI Johnson Output (mL) 2150 mL 05/24/25 1700   CBI Net Output (mL) -3000 mL 05/23/25 2112   Intake Running Total 72953 mL 05/23/25 2112   Output Running Total 75719 mL 05/24/25 1700       Findings: Small amount of clot in the bladder  irrigated out.  Very high bladder neck and bladder base with a mass effect pushing against the bladder.  Areas of concern were at the posterior wall and the bladder base and we resected this with the loop and cauterized it.  No active bleeding at this time and to get all the clots out.  No bleeding from the prostatic urethra.  I could not see the trigone and both orifices seem to be visualized.  Nothing was involved from this I saw no blood coming from either orifice but he is on dialysis.  Catheter resumed.  I talked to his daughter and informed her of the results    Complications: None apparent    Indications: 79-year-old gentleman recently went into retention now has gross hematuria which is failed to resolve and he is here for clot evacuation and cystoscopy under anesthesia.    Procedure: Taken to the operative suite given general anesthesia.  Placed lithotomy.  Prepped and draped in sterile fashion.  Surgical timeout was performed.  The cystoscope was inserted.  About 100 cc of clot was evacuated.  The resectoscope was placed with the visual obturator.  His urethra was normal.  The prostatic urethra was not obstructing pretty wide open open bladder neck but elevated took a lot of force to get over this area it was very elevated and pushed up and I do not know whether was just his anatomy but he certainly had almost a mass effect on the left side of his bladder.  Using the loop resectoscope I resected a lot of his bladder base and I cauterized it to get it stop bleeding the seem to be 1 source and then at the posterior wall there was another area of concern I scraped this and resected this area.  Both areas were pry 2 to 3 cm each I cauterized all these areas.  I could not see much in the dome and even if I did I could not get up there with the body habitus that he has it was just impossible he is such a big juan luis but I do not see any obvious bleeding up there and certainly nothing that looks like visible bladder  cancer.  These areas are certainly worrisome for bladder cancer.  A 22 [place] Jose G three-way catheter was placed to continuous irrigation and he was taken to recovery in stable condition was urine was quite clear on exiting cystoscopy suite.      Prosper Cornejo MD     Date: 5/24/2025  Time: 19:03 EDT

## 2025-05-24 NOTE — NURSING NOTE
hd without incident or c/o. tolerated well. removed 4 l. no meds administered. drsg current, cdi. stable, no c/o post completion of hd.

## 2025-05-24 NOTE — PROGRESS NOTES
Patient Name: Rock Maciel Jr.  : 1945  MRN: [id number]  Primary Care Physician: Denise Dickson APRN  Date of admission: 2025    Patient Care Team:  Denise Dickson APRN as PCP - General (Nurse Practitioner)  Azam Banegas Jr., MD as Consulting Physician (Cardiology)  Jamil Stevens MD as Consulting Physician (Nephrology)        Reason for Consult:       KILLIAN/CKD-->HD 5 days/week      Subjective:     Seen and examined, no distress  HD completed  with 2.5L UF,  HD on 25 1 unit pRBCs given  Urinary retention with hematuria, CBI stopped     Review of systems:    Constitutional:  weakness.  HEENT:  No headache, otalgia, itchy eyes, nasal discharge or sore throat.  Cardiac:  No chest pain, dyspnea, orthopnea or PND.  Chest:              No cough, phlegm or wheezing.  Abdomen:  abdominal pain  Neuro:  No focal weakness, abnormal movements or seizure-like activity.  :   Decreased UOP, hematuria  ROS was otherwise negative except as mentioned in the [place].       Medications:  Prior to Admission medications    Medication Sig Start Date End Date Taking? Authorizing Provider   amiodarone (PACERONE) 200 MG tablet Take 1 tablet by mouth Daily. 25  Yes Jo Toney MD   apixaban (ELIQUIS) 2.5 MG tablet tablet Take 1 tablet by mouth 2 (Two) Times a Day. Indications: Atrial Fibrillation 11/15/24  Yes Apurva Smith APRN   atorvastatin (LIPITOR) 20 MG tablet Take 1 tablet by mouth Every Night. NEED TO SEE PROVIDER FOR FUTURE REFILLS. 25  Yes Denise Dickson APRN   bisacodyl (DULCOLAX) 10 MG suppository Insert 1 suppository into the rectum Daily As Needed for Constipation.   Yes Provider, MD Shivam   bumetanide (BUMEX) 2 MG tablet Take 1 tablet by mouth 3 (Three) Times a Day. 3/19/25  Yes Magdalena Diamond MD   clopidogrel (PLAVIX) 75 MG tablet Take 1 tablet by mouth Daily. 10/16/24  Yes Denise Dickson APRN   diphenhydrAMINE (BENADRYL) 25 mg  capsule Take 1 capsule by mouth 3 (Three) Times a Day As Needed for Itching.   Yes Shivam Smith MD   glipizide (GLUCOTROL) 5 MG tablet Take 0.5 tablets by mouth 2 (Two) Times a Day Before Meals. Indications: Type 2 Diabetes 3/19/25  Yes Magdalena Diamond MD   glucose blood (Accu-Chek Keshia Plus) test strip USE TO TEST BLOOD SUGAR    TWICE DAILY FOR DIABETES 7/29/24  Yes Denise Dickson APRN   hydrALAZINE (APRESOLINE) 10 MG tablet Take 1 tablet by mouth 3 (Three) Times a Day. 3/19/25  Yes Magdalena Diamond MD   insulin lispro (HUMALOG/ADMELOG) 100 UNIT/ML injection Inject 2-9 Units under the skin into the appropriate area as directed 4 (Four) Times a Day Before Meals & at Bedtime. 3/19/25  Yes Magdalena Diamond MD   lidocaine (LIDODERM) 5 % Place 1 patch on the skin as directed by provider Daily. Remove & Discard patch within 12 hours or as directed by MD  apply to left shoulder   Yes Shivam Smith MD   linagliptin (Tradjenta) 5 MG tablet tablet Take 1 tablet by mouth Daily. 1/9/25  Yes Denise Dickson APRN   melatonin 5 MG tablet tablet Take 1 tablet by mouth Every Night. 3/19/25  Yes Magdalena Diamond MD   metoprolol succinate XL (TOPROL-XL) 25 MG 24 hr tablet Take 1 tablet by mouth Daily. 11/15/24  Yes Apurva Smith APRN   nitroglycerin (NITROSTAT) 0.4 MG SL tablet Place 1 tablet under the tongue Every 5 (Five) Minutes As Needed for Chest Pain (Only if SBP Greater Than 100). Take no more than 3 doses in 15 minutes. 11/15/24  Yes Apurva Smith APRN   ondansetron ODT (ZOFRAN-ODT) 4 MG disintegrating tablet Take 1 tablet by mouth Every 6 (Six) Hours As Needed for Nausea or Vomiting. 3/19/25  Yes Magdalena Diamond MD   polyethylene glycol (MIRALAX) 17 g packet Take 17 g by mouth Daily.   Yes Shivam Smith MD   senna 8.6 MG tablet Take 1 tablet by mouth Daily.   Yes Provider, Historical, MD   sevelamer (RENVELA) 800 MG tablet Take 1 tablet by mouth 3 (Three)  Times a Day With Meals.  Patient taking differently: Take 1 tablet by mouth Daily With Lunch. 3/19/25  Yes Magdalena Diamond MD   terazosin (HYTRIN) 2 MG capsule Take 1 capsule by mouth Every Night. 12/27/24  Yes Denise Dickson APRN   traZODone (DESYREL) 50 MG tablet Take 1.5 tablets by mouth Every Night.   Yes Shivam Smith MD   vitamin C (ASCORBIC ACID) 250 MG tablet Take 1 tablet by mouth Daily.   Yes Shivam Smith MD   Zinc 50 MG tablet  2/19/22  Yes Shivam Smith MD   acetaminophen (TYLENOL) 325 MG tablet Take 2 tablets by mouth Every 6 (Six) Hours As Needed for Mild Pain. 11/30/24   Bishop Chakraborty MD   Cholecalciferol 25 MCG (1000 UT) tablet Take 1 tablet by mouth Every 7 (Seven) Days.    ProviderShivam MD     Scheduled Meds:amiodarone, 200 mg, Oral, Daily  [Held by provider] apixaban, 2.5 mg, Oral, BID  vitamin C, 250 mg, Oral, Daily  atorvastatin, 20 mg, Oral, Nightly  bumetanide, 2 mg, Oral, TID  cetaphil, , Topical, Q12H  clobetasol propionate, 1 Application, Topical, Q12H  [Held by provider] clopidogrel, 75 mg, Oral, Daily  epoetin vince/vince-epbx, 10,000 Units, Subcutaneous, Once per day on Monday Wednesday Friday  [Held by provider] glipizide, 2.5 mg, Oral, BID AC  hydrALAZINE, 10 mg, Oral, TID  insulin lispro, 2-7 Units, Subcutaneous, 4x Daily AC & at Bedtime  [Held by provider] linagliptin, 5 mg, Oral, Daily  Menthol-Zinc Oxide, 1 Application, Topical, BID  metoprolol succinate XL, 25 mg, Oral, Daily  miconazole, 1 Application, Topical, Q12H  pantoprazole, 40 mg, Intravenous, BID AC  sevelamer, 800 mg, Oral, TID With Meals  sodium chloride, 10 mL, Intravenous, Q12H  sodium chloride, 10 mL, Intravenous, Q12H  sodium chloride, 10 mL, Intravenous, Q12H  sodium hypochlorite, , Topical, BID  tamsulosin, 0.4 mg, Oral, Daily      Continuous Infusions:   PRN Meds:  acetaminophen **OR** acetaminophen **OR** acetaminophen    senna-docusate sodium **AND**  polyethylene glycol **AND** bisacodyl **AND** bisacodyl    calcium carbonate    camphor-menthol    dextrose    dextrose    glucagon (human recombinant)    heparin    hydrOXYzine    lidocaine    melatonin    nitroglycerin    ondansetron ODT **OR** ondansetron    oxybutynin    oxyCODONE-acetaminophen    sodium chloride    sodium chloride    sodium chloride    sodium chloride    sodium chloride    sodium chloride    sodium chloride  Allergies:    Allergies   Allergen Reactions    Ceclor [Cefaclor] Rash     Rash in his 50s; he tolerated piperacillin-tazobactam in March 2020       Objective   Exam:     Vital Signs  Temp:  [97.3 °F (36.3 °C)-98 °F (36.7 °C)] 97.5 °F (36.4 °C)  Heart Rate:  [63-70] 70  Resp:  [18] 18  BP: (125-169)/(46-82) 141/54  SpO2:  [94 %-99 %] 94 %  on  Flow (L/min) (Oxygen Therapy):  [2] 2;   Device (Oxygen Therapy): nasal cannula  Body mass index is 38.9 kg/m².  EXAM  General:  Chronically ill appearing, in no acute distress.    Head:      Normocephalic and atraumatic.    Eyes:      PERRL/EOM intact, conjunctivae and sclerae clear without nystagmus.    Neck:      No masses, thyromegaly,  trachea central   Lungs:    Clear bilaterally to auscultation.    Heart:      Regular rate and rhythm, no murmur no gallop  Abd:        Soft, nontender, not distended, bowel sounds positive, no shifting dullness.  Msk:        No deformity or scoliosis noted of thoracic or lumbar spine.    Pulses:   Pulses normal in all 4 extremities.    Extremities:    Wound vac right foot  Neuro:    No focal deficits.   alert oriented x3  Skin:       Intact without lesions or rashes.    Psych:    Alert and cooperative; normal mood and affect; normal attention span     Access: [facility]    Results Review:  I have personally reviewed most recent Data :  BMP @LABRCNT(creatinine:10)  CBC    Results from last 7 days   Lab Units 05/24/25  0749 05/23/25  0533 05/22/25  1604 05/22/25  0535 05/21/25  2157 05/21/25  1303   WBC 10*3/mm3 7.68  6.04  --  5.56  --  5.94   HEMOGLOBIN g/dL 8.9* 9.1* 7.6* 6.6* 7.1* 6.6*   PLATELETS 10*3/mm3 208 222  --  223  --  294     CMP   Results from last 7 days   Lab Units 05/24/25  0749 05/23/25  0533 05/22/25  0535 05/21/25  1303   SODIUM mmol/L 135* 134* 135* 134*   POTASSIUM mmol/L 4.3 4.2 3.5 3.9   CHLORIDE mmol/L 99 98 96* 94*   CO2 mmol/L 27.9 28.0 30.0* 32.0*   BUN mg/dL 30* 21 30* 27*   CREATININE mg/dL 3.22* 2.63* 3.60* 2.89*   GLUCOSE mg/dL 107* 109* 50* 110*   ALBUMIN g/dL 2.8* 2.9* 2.6* 2.9*   BILIRUBIN mg/dL 0.3 0.4 0.4 0.3   ALK PHOS U/L 92 96 77 92   AST (SGOT) U/L 12 13 13 12   ALT (SGPT) U/L 10 10 9 9     ABG      No radiology results for the last 7 days      Results for orders placed during the hospital encounter of 05/21/25    Adult Transthoracic Echo Complete W/ Cont if Necessary Per Protocol    Interpretation Summary    Left ventricular ejection fraction appears to be 56 - 60%.    Left ventricular diastolic function was normal.    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 Mild transvalvular regurgitation is present in the prosthetic aortic valve.    Estimated right ventricular systolic pressure from tricuspid regurgitation is markedly elevated (>55 mmHg).        Assessment & Plan   Assessment and Plan:         Anemia    Essential hypertension    Type 2 diabetes mellitus with circulatory disorder, without long-term current use of insulin    SHASTA (obstructive sleep apnea)    H/O aortic valve replacement with porcine valve    Amputated toe of right foot    S/P CABG x 2    Amputated toe of left foot    Atrial fibrillation, persistent    Persistent atrial fibrillation    CKD (chronic kidney disease) stage 4, GFR 15-29 ml/min    Nocturnal hypoxemia    Unsteady gait when walking    Chronic heart failure with preserved ejection fraction (HFpEF)    GI bleed    Peritoneal dialysis status    Hypoalbuminemia    Moderate protein-calorie malnutrition    ASSESSMENT:  CKD with hospitalization in March 2025  requiring HD due to KILLIAN likely ATN 2/2 rhabdomyolysis, prerenal insult related to diuretics. OM, now on next stage HD 5 days per week (MTWThF) with RIJ TDC  Acute TYLER, hematuria  Urinary retention  A-fib with controlled rates.  History of HFpEF with pulmonary hypertension  Recent osteomyelitis and abscess right foot  T2DM  HTN  A-fib  CAD  PVD     TTE 5/23/25 with EF 56-60%, mild transvalvular regurgitation in prosthetic AV, RVSP >55 mmHg    PLAN :     Continue TTS schedule while inpatient, then resume next stage 5 day/week HD when discharged. HD ordered for 5/24  Chemistry largely stable  Received 1 unit pRBCs, trend H&H, iron profile with elevated ferritin. On retacrit  Urology following for urinary retention, hematuria, s/p cbi now stopped   Podiatry following per right foot wound, healing well , ID input noted.   GI following, plans for upper endoscopy when off plavix 5 days  F/u cultures  Will monitor and coordinate with team           Les Talavera MD  [facility]  5/24/2025  09:30 EDT

## 2025-05-24 NOTE — PROGRESS NOTES
Infectious Diseases Progress Note        [facility]  Los: 3 days  Patient Identification:  Name: Rock Maciel Jr.  Age: 79 y.o.  Sex: male  :  1945  MRN: [id number]         Primary Care Physician: Denise Dickson, ZEINA        Subjective: Feeling overall better denies any specific complaints stronger after receiving blood transfusion.  Denies any fever and chills  Interval History: See consultation note.    Objective:    Scheduled Meds:amiodarone, 200 mg, Oral, Daily  [Held by provider] apixaban, 2.5 mg, Oral, BID  vitamin C, 250 mg, Oral, Daily  atorvastatin, 20 mg, Oral, Nightly  bumetanide, 2 mg, Oral, TID  cetaphil, , Topical, Q12H  clobetasol propionate, 1 Application, Topical, Q12H  [Held by provider] clopidogrel, 75 mg, Oral, Daily  epoetin vince/vince-epbx, 10,000 Units, Subcutaneous, Once per day on   [Held by provider] glipizide, 2.5 mg, Oral, BID AC  hydrALAZINE, 10 mg, Oral, TID  insulin lispro, 2-7 Units, Subcutaneous, 4x Daily AC & at Bedtime  [Held by provider] linagliptin, 5 mg, Oral, Daily  Menthol-Zinc Oxide, 1 Application, Topical, BID  metoprolol succinate XL, 25 mg, Oral, Daily  miconazole, 1 Application, Topical, Q12H  pantoprazole, 40 mg, Intravenous, BID AC  sevelamer, 800 mg, Oral, TID With Meals  sodium chloride, 10 mL, Intravenous, Q12H  sodium chloride, 10 mL, Intravenous, Q12H  sodium chloride, 10 mL, Intravenous, Q12H  sodium hypochlorite, , Topical, BID  tamsulosin, 0.4 mg, Oral, Daily      Continuous Infusions:     Vital signs in last 24 hours:  Temp:  [97.3 °F (36.3 °C)-98 °F (36.7 °C)] 97.6 °F (36.4 °C)  Heart Rate:  [63-70] 65  Resp:  [16-18] 16  BP: (125-169)/(46-82) 153/63    Intake/Output:    Intake/Output Summary (Last 24 hours) at 2025 1118  Last data filed at 2025 2112  Gross per 24 hour   Intake --   Output 1000 ml   Net -1000 ml       Exam:  /63 (BP Location: Right arm, Patient Position: Lying)   Pulse 65    "Temp 97.6 °F (36.4 °C) (Temporal)   Resp 16   Ht 182.9 cm (72\")   Wt 130 kg (286 lb 13.1 oz)   SpO2 94%   BMI 38.90 kg/m²   Patient is examined using the personal protective equipment as per guidelines from infection control for this particular patient as enacted.  Hand washing was performed before and after patient interaction.  General Appearance:  Alert and oriented x 3                          Head:    Normocephalic, without obvious abnormality, atraumatic                           Eyes:    PERRL, conjunctivae/corneas clear, EOM's intact, both eyes                         Throat:   Lips, tongue, gums normal; oral mucosa pink and moist                           Neck:   Supple, symmetrical, trachea midline, no JVD                         Lungs:    Clear to auscultation bilaterally, respirations unlabored                 Chest Wall:    No tenderness or deformity                          Heart:  S1-S2 regular                  Abdomen:   Soft nontender three-way catheter in place                 Extremities: No surrounding inflammation.                              Skin: Skin changes due to decubital process noted.                  Neurologic: Alert and oriented x 3       Data Review:    I reviewed the patient's new clinical results.  Results from last 7 days   Lab Units 05/24/25  0749 05/23/25  0533 05/22/25  1604 05/22/25  0535 05/21/25  2157 05/21/25  1303   WBC 10*3/mm3 7.68 6.04  --  5.56  --  5.94   HEMOGLOBIN g/dL 8.9* 9.1* 7.6* 6.6* 7.1* 6.6*   PLATELETS 10*3/mm3 208 222  --  223  --  294     Results from last 7 days   Lab Units 05/24/25  0749 05/23/25  0533 05/22/25  0535 05/21/25  1303   SODIUM mmol/L 135* 134* 135* 134*   POTASSIUM mmol/L 4.3 4.2 3.5 3.9   CHLORIDE mmol/L 99 98 96* 94*   CO2 mmol/L 27.9 28.0 30.0* 32.0*   BUN mg/dL 30* 21 30* 27*   CREATININE mg/dL 3.22* 2.63* 3.60* 2.89*   CALCIUM mg/dL 8.4* 8.3* 8.2* 8.5*   GLUCOSE mg/dL 107* 109* 50* 110*     Microbiology Results (last 10 days)  "      Procedure Component Value - Date/Time    Urine Culture - Urine, Urine, Random Void [[id number]]  (Normal) Collected: 05/22/25 1035    Lab Status: Final result Specimen: Urine, Random Void Updated: 05/23/25 1101     Urine Culture No growth    CANDIDA AURIS PCR - Swab, Axilla Right, Axilla Left and Groin [[id number]]  (Normal) Collected: 05/21/25 1812    Lab Status: Final result Specimen: Swab from Axilla Right, Axilla Left and Groin Updated: 05/23/25 1205     JUDI AURIS PCR Not Detected                Assessment:    Anemia    Essential hypertension    Type 2 diabetes mellitus with circulatory disorder, without long-term current use of insulin    SHASTA (obstructive sleep apnea)    H/O aortic valve replacement with porcine valve    Amputated toe of right foot    S/P CABG x 2    Amputated toe of left foot    Atrial fibrillation, persistent    Persistent atrial fibrillation    CKD (chronic kidney disease) stage 4, GFR 15-29 ml/min    Nocturnal hypoxemia    Unsteady gait when walking    Chronic heart failure with preserved ejection fraction (HFpEF)    GI bleed    Peritoneal dialysis status    Hypoalbuminemia    Moderate protein-calorie malnutrition    79-year-old male with  1-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  Patient currently seems to be stable and does not have evidence of active right foot infection or systemic symptoms.  He does have open wound on the right foot that would require care.  2-severe anemia multifactorial management per primary team  3-immobility with evolving decubitus skin changes  4-end-stage renal disease on dialysis per nephrology service via right IJ tunnel catheter  5-immobilization  syndrome #6-diabetes  7-peripheral vascular disease  8-other diagnoses per primary team.        Recommendations/Discussions:  See discussion and recommendations on 5/23/2025.  Continue with local wound care as clinically there is no evidence of active infection  Continue follow-up as per podiatry recommendations.  Supportive care and management of other issues per primary team.  Rekha Delarosa MD  5/24/2025  11:18 EDT

## 2025-05-24 NOTE — ANESTHESIA PROCEDURE NOTES
Airway  Reason: elective    Date/Time: 5/24/2025 5:59 PM  Airway not difficult    General Information and Staff    Patient location during procedure: OR    Indications and Patient Condition  Indications for airway management: airway protection    Preoxygenated: yes    Mask difficulty assessment: 0 - not attempted    Final Airway Details    Final airway type: endotracheal airway      Successful airway: ETT  Cuffed: yes   Successful intubation technique: video laryngoscopy  Adjuncts used in placement: intubating stylet  Endotracheal tube insertion site: oral  Blade: CMAC  Blade size: D  ETT size (mm): 8.0  Cormack-Lehane Classification: grade I - full view of glottis  Placement verified by: chest auscultation and capnometry   Measured from: lips  ETT/EBT  to lips (cm): 22  Number of attempts at approach: 1  Assessment: lips, teeth, and gum same as pre-op and atraumatic intubation

## 2025-05-24 NOTE — ANESTHESIA PREPROCEDURE EVALUATION
Anesthesia Evaluation     Patient summary reviewed and Nursing notes reviewed   NPO Solid Status: > 6 hours  NPO Liquid Status: > 2 hours           Airway   Mallampati: III  TM distance: >3 FB  Neck ROM: full  Dental - normal exam     Pulmonary    (+) ,sleep apnea  (-) COPD, asthma, not a smoker  Cardiovascular     ECG reviewed    (+) hypertension, valvular problems/murmurs (s/p tissue AVR), dysrhythmias Atrial Fib, CHF Diastolic >=55%, hyperlipidemia  (-) past MI, angina    ROS comment: LAFB and RBBB on EKG    Echo 5/2025 nl LV/RV fxn, mild-mod MR, bioprosthetic AVR noted     Neuro/Psych  (-) seizures, CVA  GI/Hepatic/Renal/Endo    (+) morbid obesity, hiatal hernia, renal disease (TTS HD, HD 5/24 4L removed)- ESRD and dialysis, diabetes mellitus type 2  (-) GERD, liver disease, no thyroid disorder    Musculoskeletal     Abdominal    Substance History      OB/GYN          Other                          Anesthesia Plan    ASA 3     general       Anesthetic plan, risks, benefits, and alternatives have been provided, discussed and informed consent has been obtained with: patient.        CODE STATUS:    Code Status (Patient has no pulse and is not breathing): CPR (Attempt to Resuscitate)  Medical Interventions (Patient has pulse or is breathing): Full

## 2025-05-24 NOTE — PROGRESS NOTES
Name: Rock Maciel Jr. ADMIT: 2025   : 1945  PCP: Denise Dickson APRN    MRN: [id number] LOS: 3 days   AGE/SEX: 79 y.o. male  ROOM: [facility] OR/MAIN OR     Subjective   Subjective   Patient is lying in the bed and does not appear to be in major distress.  Denies nausea, vomiting abdominal pain, chest pain.       Objective   Objective   Vital Signs  Temp:  [97.1 °F (36.2 °C)-98.2 °F (36.8 °C)] 98.2 °F (36.8 °C)  Heart Rate:  [65-82] 79  Resp:  [16-18] 18  BP: (116-153)/(46-82) 116/48  SpO2:  [94 %-99 %] 98 %  on  Flow (L/min) (Oxygen Therapy):  [2-3] 3;   Device (Oxygen Therapy): nasal cannula  Body mass index is 38.9 kg/m².  Physical Exam   HEENT: PERRLA, extract movements intact, Scleras no icterus  Neck: Supple, no JVD  Cardiovascular: Regular rate and rhythm with normal S1 and S2  Respiratory: Fairly clear to auscultation anteriorly with no wheezes  GI: Soft, nontender, bowel sounds are present  Extremities: No edema, palpable pedal pulses  Neurologic: Grossly nonfocal, no facial asymmetry      Results Review     I reviewed the patient's new clinical results.  Results from last 7 days   Lab Units 25  0749 25  0533 25  1604 25  0535 25  2157 25  1303   WBC 10*3/mm3 7.68 6.04  --  5.56  --  5.94   HEMOGLOBIN g/dL 8.9* 9.1* 7.6* 6.6*   < > 6.6*   PLATELETS 10*3/mm3 208 222  --  223  --  294    < > = values in this interval not displayed.     Results from last 7 days   Lab Units 25  0749 25  0533 25  0535 25  1303   SODIUM mmol/L 135* 134* 135* 134*   POTASSIUM mmol/L 4.3 4.2 3.5 3.9   CHLORIDE mmol/L 99 98 96* 94*   CO2 mmol/L 27.9 28.0 30.0* 32.0*   BUN mg/dL 30* 21 30* 27*   CREATININE mg/dL 3.22* 2.63* 3.60* 2.89*   GLUCOSE mg/dL 107* 109* 50* 110*   EGFR mL/min/1.73 18.7* 23.8* 16.4* 21.3*     Results from last 7 days   Lab Units 25  0749 25  0533 25  0535 25  1303   ALBUMIN g/dL 2.8* 2.9* 2.6* 2.9*    BILIRUBIN mg/dL 0.3 0.4 0.4 0.3   ALK PHOS U/L 92 96 77 92   AST (SGOT) U/L 12 13 13 12   ALT (SGPT) U/L 10 10 9 9     Results from last 7 days   Lab Units 05/24/25  0749 05/23/25  0533 05/22/25  0535 05/21/25  1303   CALCIUM mg/dL 8.4* 8.3* 8.2* 8.5*   ALBUMIN g/dL 2.8* 2.9* 2.6* 2.9*   MAGNESIUM mg/dL  --  2.0 2.0  --    PHOSPHORUS mg/dL 4.5 3.7  --   --        Glucose   Date/Time Value Ref Range Status   05/24/2025 1408 88 70 - 130 mg/dL Final   05/24/2025 0602 131 (H) 70 - 130 mg/dL Final   05/23/2025 2102 107 70 - 130 mg/dL Final   05/23/2025 1545 156 (H) 70 - 130 mg/dL Final   05/23/2025 1047 85 70 - 130 mg/dL Final   05/23/2025 0605 104 70 - 130 mg/dL Final   05/22/2025 2009 163 (H) 70 - 130 mg/dL Final       No radiology results for the last day    I have personally reviewed all medications:  Scheduled Medications  amiodarone, 200 mg, Oral, Daily  [Held by provider] apixaban, 2.5 mg, Oral, BID  [Transfer Hold] vitamin C, 250 mg, Oral, Daily  [Transfer Hold] atorvastatin, 20 mg, Oral, Nightly  [Transfer Hold] bumetanide, 2 mg, Oral, TID  [Transfer Hold] cetaphil, , Topical, Q12H  [Transfer Hold] clobetasol propionate, 1 Application, Topical, Q12H  [Held by provider] clopidogrel, 75 mg, Oral, Daily  [Transfer Hold] epoetin vince/vince-epbx, 10,000 Units, Subcutaneous, Once per day on Monday Wednesday Friday  [Held by provider] glipizide, 2.5 mg, Oral, BID AC  hydrALAZINE, 10 mg, Oral, TID  [Transfer Hold] insulin lispro, 2-7 Units, Subcutaneous, 4x Daily AC & at Bedtime  [Held by provider] linagliptin, 5 mg, Oral, Daily  [Transfer Hold] Menthol-Zinc Oxide, 1 Application, Topical, BID  metoprolol succinate XL, 25 mg, Oral, Daily  [Transfer Hold] miconazole, 1 Application, Topical, Q12H  [Transfer Hold] pantoprazole, 40 mg, Intravenous, BID AC  [Transfer Hold] sevelamer, 800 mg, Oral, TID With Meals  [Transfer Hold] sodium chloride, 10 mL, Intravenous, Q12H  [Transfer Hold] sodium chloride, 10 mL, Intravenous,  Q12H  [Transfer Hold] sodium chloride, 10 mL, Intravenous, Q12H  sodium chloride, 3 mL, Intravenous, Q12H  [Transfer Hold] sodium hypochlorite, , Topical, BID  [Transfer Hold] tamsulosin, 0.4 mg, Oral, Daily    Infusions  lactated ringers, 9 mL/hr    Diet  NPO Diet NPO Type: Strict NPO    I have personally reviewed:  [x]  Laboratory   [x]  Microbiology   [x]  Radiology   [x]  EKG/Telemetry  [x]  Cardiology/Vascular   []  Pathology    []  Records       Assessment/Plan     Active Hospital Problems    Diagnosis  POA    **Anemia [D64.9]  Yes    Moderate protein-calorie malnutrition [E44.0]  Yes    GI bleed [K92.2]  Yes    Peritoneal dialysis status [Z99.2]  Not Applicable    Hypoalbuminemia [E88.09]  Yes    Acute blood loss anemia [D62]  Unknown    Chronic heart failure with preserved ejection fraction (HFpEF) [I50.32]  Yes    Unsteady gait when walking [R26.81]  Yes    Nocturnal hypoxemia [G47.34]  Yes    CKD (chronic kidney disease) stage 4, GFR 15-29 ml/min [N18.4]  Yes    Persistent atrial fibrillation [I48.19]  Yes    Atrial fibrillation, persistent [I48.19]  Yes    Amputated toe of left foot [S98.132A]  Yes    S/P CABG x 2 [Z95.1]  Not Applicable    Amputated toe of right foot [S98.131A]  Yes    H/O aortic valve replacement with porcine valve [Z95.3]  Not Applicable    Type 2 diabetes mellitus with circulatory disorder, without long-term current use of insulin [E11.59]  Yes    SHASTA (obstructive sleep apnea) [G47.33]  Yes    Essential hypertension [I10]  Yes      Resolved Hospital Problems   No resolved problems to display.       79 y.o. male admitted with Anemia.    1. Anemia/hematuria, urology reevaluated and will undergo CBI.  Hemoglobin initially was 6.6 and received total of 2 units of PRBC and is improved to 9.1>8.9.  Urology is following along and given improvement in hematuria cystoscopy is being planned as an outpatient basis.    2.  ESRD, nephrology did evaluate and will continue with hemodialysis Tuesday  Thursday and Saturday.    3.  Chronic atrial fibrillation, Plavix and Eliquis is on hold and will continue with amiodarone.  Continue with metoprolol as well.  Resume Plavix/Eliquis once cleared by urology.    4.  Chronic diastolic heart failure, on metoprolol and Bumex which will be continued.    5.  CAD/CABG/history of aortic valve replacement with porcine valve    6.  Right thigh contact rash, on topical steroids.    7.  Hypertension, on hydralazine, metoprolol.    8.  Diabetes mellitus, on corrective dose insulin which will be continued for the moment.    9.  History of BPH, on terazosin.    10.  Obesity with BMI of 31.86, counseled to lose weight.    11.  Lower right lower extremity ulcer, podiatry did evaluate and no evidence of any infection and recommended to continue local wound care.  Wound VAC is being discontinued and wet-to-dry dressings will be continued.  Nonweightbearing on right lower extremity until cleared by podiatry.    Copy text on this note has been reviewed by me on 5/24/2025    Wesley Christianson MD  [facility]  05/24/25  16:59 EDT

## 2025-05-24 NOTE — PROGRESS NOTES
79-year-old male with gross hematuria on CBI.  Also on dialysis d/t ESRD; followed by nephrology.Thinners on hold for now in anticipation of EGD by GI    Patient sitting up in bed watching television. Denies pain.  Abdomen soft and nontender. Vital signs stable. Red tinged urine in abdi bag with CBI    Lab:   Creatinine 3.22  GFR 18.7  Hg 8.9  Hct 29.4  WBC 7.68    PLAN;  Recommend to continue CBI for now  Recommend to continue Flomax daily  Patient will remain NPO at this time   Plan for cysto with fulguration today with Dr. SHANNAN Cornejo  Continue abdi catheter care

## 2025-05-24 NOTE — PROGRESS NOTES
[facility]     Progress Note    Patient Name: Rock Maciel Jr.  : 1945  MRN: [id number]  Primary Care Physician:  Denise Dickson APRN  Date of admission: 2025    Subjective   Subjective     Chief Complaint: Anemia, + heme.     Abnormal Lab  Symptoms include weakness.    Pertinent negative symptoms include no nausea and no vomiting.     Patient Reports feeling better. Resting in bed, reading a magazine. Has no complaints. Tolerating diet well. Feels stronger after infusion.     Review of Systems   Constitutional:  Negative for activity change.   Respiratory:  Negative for chest tightness.    Gastrointestinal:  Positive for blood in stool. Negative for constipation, diarrhea, nausea and vomiting.   Neurological:  Positive for weakness.       Objective   Objective     Vitals:   Temp:  [97.3 °F (36.3 °C)-98 °F (36.7 °C)] 97.6 °F (36.4 °C)  Heart Rate:  [63-70] 65  Resp:  [16-18] 16  BP: (125-153)/(46-82) 153/63  Flow (L/min) (Oxygen Therapy):  [2] 2    Physical Exam  Cardiovascular:      Rate and Rhythm: Regular rhythm.   Pulmonary:      Effort: Pulmonary effort is normal.   Abdominal:      Palpations: Abdomen is soft.   Skin:     General: Skin is warm.   Neurological:      Mental Status: He is alert.   Psychiatric:         Mood and Affect: Mood normal.          Result Review    Result Review:  I have personally reviewed the results from the time of this admission to 2025 12:23 EDT and agree with these findings:  [x]  Laboratory list / accordion  [x]  Microbiology  []  Radiology  []  EKG/Telemetry   []  Cardiology/Vascular   []  Pathology  []  Old records  []  Other:  Most notable findings include: Hgb 6.6, 7.6, 9.1, 8.9      Assessment & Plan   Assessment / Plan     Brief Patient Summary:  Rock Maciel Jr. is a 79 y.o. male who presents today for:  .- Heme + stool   .- Anemia of chronic disease     Active Hospital Problems:  Active Hospital Problems    Diagnosis     **Anemia      Moderate protein-calorie malnutrition     GI bleed     Peritoneal dialysis status     Hypoalbuminemia     Chronic heart failure with preserved ejection fraction (HFpEF)     Unsteady gait when walking     Nocturnal hypoxemia     CKD (chronic kidney disease) stage 4, GFR 15-29 ml/min     Persistent atrial fibrillation     Atrial fibrillation, persistent     Amputated toe of left foot     S/P CABG x 2     Amputated toe of right foot     H/O aortic valve replacement with porcine valve     Type 2 diabetes mellitus with circulatory disorder, without long-term current use of insulin     SHASTA (obstructive sleep apnea)     Essential hypertension      Plan:   .- Monitor H&H with AM labs. Transfuse as needed.   .- Holding on Plavix or upcomming scopes.   .- Continue supportive care.  .- Continue current diet. NPO at 2359  .- Will plan for scope 5/25  .- GI following.        VTE Prophylaxis:  Pharmacologic & mechanical VTE prophylaxis orders are present.        CODE STATUS:    Code Status (Patient has no pulse and is not breathing): CPR (Attempt to Resuscitate)  Medical Interventions (Patient has pulse or is breathing): Full    Disposition:  I expect patient to be discharged ALBAN Domingo, APRN

## 2025-05-25 ENCOUNTER — INPATIENT HOSPITAL (OUTPATIENT)
Dept: URBAN - METROPOLITAN AREA HOSPITAL 113 | Facility: HOSPITAL | Age: 80
End: 2025-05-25
Payer: MEDICARE

## 2025-05-25 ENCOUNTER — ANESTHESIA EVENT (OUTPATIENT)
Dept: GASTROENTEROLOGY | Facility: HOSPITAL | Age: 80
End: 2025-05-25
Payer: MEDICARE

## 2025-05-25 ENCOUNTER — APPOINTMENT (OUTPATIENT)
Dept: ULTRASOUND IMAGING | Facility: HOSPITAL | Age: 80
End: 2025-05-25
Payer: MEDICARE

## 2025-05-25 ENCOUNTER — ANESTHESIA (OUTPATIENT)
Dept: GASTROENTEROLOGY | Facility: HOSPITAL | Age: 80
End: 2025-05-25
Payer: MEDICARE

## 2025-05-25 DIAGNOSIS — K31.89 OTHER DISEASES OF STOMACH AND DUODENUM: ICD-10-CM

## 2025-05-25 DIAGNOSIS — K22.89 OTHER SPECIFIED DISEASE OF ESOPHAGUS: ICD-10-CM

## 2025-05-25 DIAGNOSIS — R19.5 OTHER FECAL ABNORMALITIES: ICD-10-CM

## 2025-05-25 LAB
ALBUMIN SERPL-MCNC: 2.7 G/DL (ref 3.5–5.2)
ALBUMIN/GLOB SERPL: 0.7 G/DL
ALP SERPL-CCNC: 91 U/L (ref 39–117)
ALT SERPL W P-5'-P-CCNC: 11 U/L (ref 1–41)
ANION GAP SERPL CALCULATED.3IONS-SCNC: 6.1 MMOL/L (ref 5–15)
AST SERPL-CCNC: 14 U/L (ref 1–40)
BASOPHILS # BLD MANUAL: 0 10*3/MM3 (ref 0–0.2)
BASOPHILS NFR BLD MANUAL: 0 % (ref 0–1.5)
BILIRUB SERPL-MCNC: 0.3 MG/DL (ref 0–1.2)
BUN SERPL-MCNC: 24 MG/DL (ref 8–23)
BUN/CREAT SERPL: 8.8 (ref 7–25)
C DIFF GDH + TOXINS A+B STL QL IA.RAPID: NEGATIVE
C DIFF TOX GENS STL QL NAA+PROBE: POSITIVE
CALCIUM SPEC-SCNC: 8.6 MG/DL (ref 8.6–10.5)
CHLORIDE SERPL-SCNC: 104 MMOL/L (ref 98–107)
CO2 SERPL-SCNC: 25.9 MMOL/L (ref 22–29)
CREAT SERPL-MCNC: 2.74 MG/DL (ref 0.76–1.27)
DEPRECATED RDW RBC AUTO: 53.7 FL (ref 37–54)
EGFRCR SERPLBLD CKD-EPI 2021: 22.7 ML/MIN/1.73
EOSINOPHIL # BLD MANUAL: 0 10*3/MM3 (ref 0–0.4)
EOSINOPHIL NFR BLD MANUAL: 0 % (ref 0.3–6.2)
ERYTHROCYTE [DISTWIDTH] IN BLOOD BY AUTOMATED COUNT: 15.1 % (ref 12.3–15.4)
GLOBULIN UR ELPH-MCNC: 3.9 GM/DL
GLUCOSE BLDC GLUCOMTR-MCNC: 68 MG/DL (ref 70–130)
GLUCOSE BLDC GLUCOMTR-MCNC: 72 MG/DL (ref 70–130)
GLUCOSE BLDC GLUCOMTR-MCNC: 98 MG/DL (ref 70–130)
GLUCOSE SERPL-MCNC: 69 MG/DL (ref 65–99)
HCT VFR BLD AUTO: 32.4 % (ref 37.5–51)
HGB BLD-MCNC: 10.1 G/DL (ref 13–17.7)
LYMPHOCYTES # BLD MANUAL: 0.14 10*3/MM3 (ref 0.7–3.1)
LYMPHOCYTES NFR BLD MANUAL: 1 % (ref 5–12)
MCH RBC QN AUTO: 30.3 PG (ref 26.6–33)
MCHC RBC AUTO-ENTMCNC: 31.2 G/DL (ref 31.5–35.7)
MCV RBC AUTO: 97.3 FL (ref 79–97)
MONOCYTES # BLD: 0.14 10*3/MM3 (ref 0.1–0.9)
NEUTROPHILS # BLD AUTO: 13.56 10*3/MM3 (ref 1.7–7)
NEUTROPHILS NFR BLD MANUAL: 97.9 % (ref 42.7–76)
NRBC BLD AUTO-RTO: 0 /100 WBC (ref 0–0.2)
PHOSPHATE SERPL-MCNC: 3.6 MG/DL (ref 2.5–4.5)
PLAT MORPH BLD: NORMAL
PLATELET # BLD AUTO: 229 10*3/MM3 (ref 140–450)
PMV BLD AUTO: 9.4 FL (ref 6–12)
POTASSIUM SERPL-SCNC: 4.5 MMOL/L (ref 3.5–5.2)
PROT SERPL-MCNC: 6.6 G/DL (ref 6–8.5)
RBC # BLD AUTO: 3.33 10*6/MM3 (ref 4.14–5.8)
RBC MORPH BLD: NORMAL
SODIUM SERPL-SCNC: 136 MMOL/L (ref 136–145)
VARIANT LYMPHS NFR BLD MANUAL: 1 % (ref 19.6–45.3)
WBC MORPH BLD: NORMAL
WBC NRBC COR # BLD AUTO: 13.85 10*3/MM3 (ref 3.4–10.8)

## 2025-05-25 PROCEDURE — 85025 COMPLETE CBC W/AUTO DIFF WBC: CPT | Performed by: UROLOGY

## 2025-05-25 PROCEDURE — 85007 BL SMEAR W/DIFF WBC COUNT: CPT | Performed by: UROLOGY

## 2025-05-25 PROCEDURE — 0DB58ZX EXCISION OF ESOPHAGUS, VIA NATURAL OR ARTIFICIAL OPENING ENDOSCOPIC, DIAGNOSTIC: ICD-10-PCS | Performed by: INTERNAL MEDICINE

## 2025-05-25 PROCEDURE — 25010000002 PHENYLEPHRINE 10 MG/ML SOLUTION: Performed by: STUDENT IN AN ORGANIZED HEALTH CARE EDUCATION/TRAINING PROGRAM

## 2025-05-25 PROCEDURE — 43235 EGD DIAGNOSTIC BRUSH WASH: CPT | Performed by: INTERNAL MEDICINE

## 2025-05-25 PROCEDURE — 25810000003 LACTATED RINGERS PER 1000 ML: Performed by: INTERNAL MEDICINE

## 2025-05-25 PROCEDURE — 25010000002 EPINEPHRINE PER 0.1 MG: Performed by: STUDENT IN AN ORGANIZED HEALTH CARE EDUCATION/TRAINING PROGRAM

## 2025-05-25 PROCEDURE — 87449 NOS EACH ORGANISM AG IA: CPT | Performed by: NURSE PRACTITIONER

## 2025-05-25 PROCEDURE — 82948 REAGENT STRIP/BLOOD GLUCOSE: CPT

## 2025-05-25 PROCEDURE — 80053 COMPREHEN METABOLIC PANEL: CPT | Performed by: UROLOGY

## 2025-05-25 PROCEDURE — 76705 ECHO EXAM OF ABDOMEN: CPT

## 2025-05-25 PROCEDURE — 25010000002 PROPOFOL 10 MG/ML EMULSION: Performed by: STUDENT IN AN ORGANIZED HEALTH CARE EDUCATION/TRAINING PROGRAM

## 2025-05-25 PROCEDURE — 84100 ASSAY OF PHOSPHORUS: CPT | Performed by: UROLOGY

## 2025-05-25 PROCEDURE — 25010000002 LIDOCAINE 2% SOLUTION: Performed by: STUDENT IN AN ORGANIZED HEALTH CARE EDUCATION/TRAINING PROGRAM

## 2025-05-25 PROCEDURE — 88305 TISSUE EXAM BY PATHOLOGIST: CPT | Performed by: INTERNAL MEDICINE

## 2025-05-25 PROCEDURE — 87493 C DIFF AMPLIFIED PROBE: CPT | Performed by: NURSE PRACTITIONER

## 2025-05-25 RX ORDER — LIDOCAINE HYDROCHLORIDE 20 MG/ML
INJECTION, SOLUTION INFILTRATION; PERINEURAL AS NEEDED
Status: DISCONTINUED | OUTPATIENT
Start: 2025-05-25 | End: 2025-05-25 | Stop reason: SURG

## 2025-05-25 RX ORDER — VANCOMYCIN HYDROCHLORIDE 125 MG/1
125 CAPSULE ORAL EVERY 6 HOURS SCHEDULED
Status: DISCONTINUED | OUTPATIENT
Start: 2025-05-25 | End: 2025-06-04 | Stop reason: HOSPADM

## 2025-05-25 RX ORDER — LOPERAMIDE HYDROCHLORIDE 2 MG/1
2 CAPSULE ORAL ONCE
Status: COMPLETED | OUTPATIENT
Start: 2025-05-25 | End: 2025-05-25

## 2025-05-25 RX ORDER — EPINEPHRINE 1 MG/ML
INJECTION, SOLUTION, CONCENTRATE INTRAVENOUS AS NEEDED
Status: DISCONTINUED | OUTPATIENT
Start: 2025-05-25 | End: 2025-05-25 | Stop reason: SURG

## 2025-05-25 RX ORDER — SODIUM CHLORIDE, SODIUM LACTATE, POTASSIUM CHLORIDE, CALCIUM CHLORIDE 600; 310; 30; 20 MG/100ML; MG/100ML; MG/100ML; MG/100ML
30 INJECTION, SOLUTION INTRAVENOUS CONTINUOUS
Status: ACTIVE | OUTPATIENT
Start: 2025-05-25 | End: 2025-05-26

## 2025-05-25 RX ORDER — PHENYLEPHRINE HYDROCHLORIDE 10 MG/ML
INJECTION INTRAVENOUS AS NEEDED
Status: DISCONTINUED | OUTPATIENT
Start: 2025-05-25 | End: 2025-05-25 | Stop reason: SURG

## 2025-05-25 RX ORDER — PROPOFOL 10 MG/ML
VIAL (ML) INTRAVENOUS AS NEEDED
Status: DISCONTINUED | OUTPATIENT
Start: 2025-05-25 | End: 2025-05-25 | Stop reason: SURG

## 2025-05-25 RX ADMIN — PHENYLEPHRINE HYDROCHLORIDE 200 MCG: 10 INJECTION INTRAVENOUS at 10:52

## 2025-05-25 RX ADMIN — EPINEPHRINE 25 MCG: 1 INJECTION, SOLUTION, CONCENTRATE INTRAVENOUS at 11:06

## 2025-05-25 RX ADMIN — DAKIN'S SOLUTION 0.125% (QUARTER STRENGTH): 0.12 SOLUTION at 21:21

## 2025-05-25 RX ADMIN — PROPOFOL 80 MG: 10 INJECTION, EMULSION INTRAVENOUS at 10:44

## 2025-05-25 RX ADMIN — LIDOCAINE HYDROCHLORIDE 30 MG: 20 INJECTION, SOLUTION INFILTRATION; PERINEURAL at 10:44

## 2025-05-25 RX ADMIN — OXYCODONE HYDROCHLORIDE AND ACETAMINOPHEN 250 MG: 500 TABLET ORAL at 13:31

## 2025-05-25 RX ADMIN — Medication: at 13:32

## 2025-05-25 RX ADMIN — TAMSULOSIN HYDROCHLORIDE 0.4 MG: 0.4 CAPSULE ORAL at 13:31

## 2025-05-25 RX ADMIN — BUMETANIDE 2 MG: 1 TABLET ORAL at 13:31

## 2025-05-25 RX ADMIN — EPINEPHRINE 10 MCG: 1 INJECTION, SOLUTION, CONCENTRATE INTRAVENOUS at 10:53

## 2025-05-25 RX ADMIN — PROPOFOL 300 MCG/KG/MIN: 10 INJECTION, EMULSION INTRAVENOUS at 10:44

## 2025-05-25 RX ADMIN — Medication 5 MG: at 21:21

## 2025-05-25 RX ADMIN — BUMETANIDE 2 MG: 1 TABLET ORAL at 17:37

## 2025-05-25 RX ADMIN — MENTHOL, ZINC OXIDE 1 APPLICATION: .44; 20.6 OINTMENT TOPICAL at 13:32

## 2025-05-25 RX ADMIN — PHENYLEPHRINE HYDROCHLORIDE 200 MCG: 10 INJECTION INTRAVENOUS at 10:54

## 2025-05-25 RX ADMIN — BUMETANIDE 2 MG: 1 TABLET ORAL at 05:38

## 2025-05-25 RX ADMIN — VANCOMYCIN HYDROCHLORIDE 125 MG: 125 CAPSULE ORAL at 13:31

## 2025-05-25 RX ADMIN — DAKIN'S SOLUTION 0.125% (QUARTER STRENGTH): 0.12 SOLUTION at 09:15

## 2025-05-25 RX ADMIN — ATORVASTATIN CALCIUM 20 MG: 20 TABLET, FILM COATED ORAL at 21:21

## 2025-05-25 RX ADMIN — SEVELAMER CARBONATE 800 MG: 800 TABLET, FILM COATED ORAL at 13:31

## 2025-05-25 RX ADMIN — LOPERAMIDE HYDROCHLORIDE 2 MG: 2 CAPSULE ORAL at 05:38

## 2025-05-25 RX ADMIN — HYDRALAZINE HYDROCHLORIDE 10 MG: 10 TABLET ORAL at 17:37

## 2025-05-25 RX ADMIN — PANTOPRAZOLE SODIUM 40 MG: 40 INJECTION, POWDER, FOR SOLUTION INTRAVENOUS at 09:15

## 2025-05-25 RX ADMIN — PANTOPRAZOLE SODIUM 40 MG: 40 INJECTION, POWDER, FOR SOLUTION INTRAVENOUS at 17:37

## 2025-05-25 RX ADMIN — Medication: at 21:23

## 2025-05-25 RX ADMIN — AMIODARONE HYDROCHLORIDE 200 MG: 200 TABLET ORAL at 13:31

## 2025-05-25 RX ADMIN — MENTHOL, ZINC OXIDE 1 APPLICATION: .44; 20.6 OINTMENT TOPICAL at 21:21

## 2025-05-25 RX ADMIN — CLOBETASOL PROPIONATE CREAM USP, 0.05% 1 APPLICATION: 0.5 CREAM TOPICAL at 21:22

## 2025-05-25 RX ADMIN — ANTI-FUNGAL POWDER MICONAZOLE NITRATE TALC FREE 1 APPLICATION: 1.42 POWDER TOPICAL at 13:32

## 2025-05-25 RX ADMIN — METOPROLOL SUCCINATE 25 MG: 25 TABLET, EXTENDED RELEASE ORAL at 13:31

## 2025-05-25 RX ADMIN — CLOBETASOL PROPIONATE CREAM USP, 0.05% 1 APPLICATION: 0.5 CREAM TOPICAL at 13:32

## 2025-05-25 RX ADMIN — HYDROXYZINE HYDROCHLORIDE 25 MG: 25 TABLET, FILM COATED ORAL at 00:29

## 2025-05-25 RX ADMIN — SEVELAMER CARBONATE 800 MG: 800 TABLET, FILM COATED ORAL at 17:37

## 2025-05-25 RX ADMIN — ANTI-FUNGAL POWDER MICONAZOLE NITRATE TALC FREE 1 APPLICATION: 1.42 POWDER TOPICAL at 21:23

## 2025-05-25 RX ADMIN — SODIUM CHLORIDE, POTASSIUM CHLORIDE, SODIUM LACTATE AND CALCIUM CHLORIDE 30 ML/HR: 600; 310; 30; 20 INJECTION, SOLUTION INTRAVENOUS at 10:02

## 2025-05-25 RX ADMIN — HYDROXYZINE HYDROCHLORIDE 25 MG: 25 TABLET, FILM COATED ORAL at 21:21

## 2025-05-25 RX ADMIN — EPINEPHRINE 20 MCG: 1 INJECTION, SOLUTION, CONCENTRATE INTRAVENOUS at 10:59

## 2025-05-25 RX ADMIN — EPINEPHRINE 10 MCG: 1 INJECTION, SOLUTION, CONCENTRATE INTRAVENOUS at 10:50

## 2025-05-25 RX ADMIN — Medication 10 ML: at 21:23

## 2025-05-25 RX ADMIN — VANCOMYCIN HYDROCHLORIDE 125 MG: 125 CAPSULE ORAL at 23:39

## 2025-05-25 RX ADMIN — CAMPHOR, MENTHOL: .5; .5 LOTION TOPICAL at 21:22

## 2025-05-25 RX ADMIN — VANCOMYCIN HYDROCHLORIDE 125 MG: 125 CAPSULE ORAL at 17:37

## 2025-05-25 NOTE — ANESTHESIA POSTPROCEDURE EVALUATION
Patient: Rock Maciel Jr.    Procedure Summary       Date: 05/25/25 Room / Location: [facility] ENDOSCOPY 7 / [facility] ENDOSCOPY    Anesthesia Start: 1040 Anesthesia Stop: 1109    Procedure: ESOPHAGOGASTRODUODENOSCOPY with biopsy (Esophagus) Diagnosis:       Gastrointestinal hemorrhage, unspecified gastrointestinal hemorrhage type      (Gastrointestinal hemorrhage, unspecified gastrointestinal hemorrhage type [K92.2])    Surgeons: Darrian Webb MD Provider: Noel Santos MD    Anesthesia Type: MAC ASA Status: 4            Anesthesia Type: MAC    Vitals  Vitals Value Taken Time   BP 96/50 05/25/25 11:25   Temp     Pulse 83 05/25/25 11:32   Resp 13 05/25/25 11:25   SpO2 92 % 05/25/25 11:32   Vitals shown include unfiled device data.        Post Anesthesia Care and Evaluation      Comments: Discharge criteria met per RN

## 2025-05-25 NOTE — PROGRESS NOTES
Infectious Diseases Progress Note        [facility]  [place]: 4 days  Patient Identification:  Name: Rock Maciel Jr.  Age: 79 y.o.  Sex: male  :  1945  MRN: [id number]         Primary Care Physician: Denise Dickson, ZEINA        Subjective: Feeling overall better denies any specific complaints stronger after receiving blood transfusion.  Denies any fever and chills  Interval History: See consultation note.    Objective:    Scheduled Meds:amiodarone, 200 mg, Oral, Daily  [Held by provider] apixaban, 2.5 mg, Oral, BID  vitamin C, 250 mg, Oral, Daily  atorvastatin, 20 mg, Oral, Nightly  bumetanide, 2 mg, Oral, TID  cefTRIAXone, 2,000 mg, Intravenous, Q24H  cetaphil, , Topical, Q12H  clobetasol propionate, 1 Application, Topical, Q12H  [Held by provider] clopidogrel, 75 mg, Oral, Daily  epoetin vince/vince-epbx, 10,000 Units, Subcutaneous, Once per day on   [Held by provider] glipizide, 2.5 mg, Oral, BID AC  hydrALAZINE, 10 mg, Oral, TID  insulin lispro, 2-7 Units, Subcutaneous, 4x Daily AC & at Bedtime  [Held by provider] linagliptin, 5 mg, Oral, Daily  Menthol-Zinc Oxide, 1 Application, Topical, BID  metoprolol succinate XL, 25 mg, Oral, Daily  miconazole, 1 Application, Topical, Q12H  pantoprazole, 40 mg, Intravenous, BID AC  sevelamer, 800 mg, Oral, TID With Meals  sodium chloride, 10 mL, Intravenous, Q12H  sodium chloride, 10 mL, Intravenous, Q12H  sodium chloride, 10 mL, Intravenous, Q12H  sodium hypochlorite, , Topical, BID  tamsulosin, 0.4 mg, Oral, Daily  vancomycin, 125 mg, Oral, Q6H      Continuous Infusions:lactated ringers, 30 mL/hr, Last Rate: 30 mL/hr (25 1002)  Pharmacy Consult,         Vital signs in last 24 hours:  Temp:  [97.3 °F (36.3 °C)-99.1 °F (37.3 °C)] 98.6 °F (37 °C)  Heart Rate:  [] 82  Resp:  [13-18] 15  BP: ()/(39-73) 123/73    Intake/Output:    Intake/Output Summary (Last 24 hours) at 2025 1629  Last data filed at  "5/24/2025 2051  Gross per 24 hour   Intake 800 ml   Output 550 ml   Net 250 ml       Exam:  /73 (BP Location: Right arm, Patient Position: Lying)   Pulse 82   Temp 98.6 °F (37 °C) (Oral)   Resp 15   Ht 182.9 cm (72\")   Wt 123 kg (272 lb 0.8 oz)   SpO2 99%   BMI 36.90 kg/m²   Patient is examined using the personal protective equipment as per guidelines from infection control for this particular patient as enacted.  Hand washing was performed before and after patient interaction.  General Appearance:  Alert and oriented x 3                          Head:    Normocephalic, without obvious abnormality, atraumatic                           Eyes:    PERRL, conjunctivae/corneas clear, EOM's intact, both eyes                         Throat:   Lips, tongue, gums normal; oral mucosa pink and moist                           Neck:   Supple, symmetrical, trachea midline, no JVD                         Lungs:    Clear to auscultation bilaterally, respirations unlabored                 Chest Wall:    No tenderness or deformity                          Heart:  S1-S2 regular                  Abdomen:   Soft nontender three-way catheter in place                 Extremities: No surrounding inflammation.                              Skin: Skin changes due to decubital process noted.                  Neurologic: Alert and oriented x 3       Data Review:    I reviewed the patient's new clinical results.  Results from last 7 days   Lab Units 05/25/25  0510 05/24/25  0749 05/23/25  0533 05/22/25  1604 05/22/25  0535 05/21/25  2157 05/21/25  1303   WBC 10*3/mm3 13.85* 7.68 6.04  --  5.56  --  5.94   HEMOGLOBIN g/dL 10.1* 8.9* 9.1* 7.6* 6.6* 7.1* 6.6*   PLATELETS 10*3/mm3 229 208 222  --  223  --  294     Results from last 7 days   Lab Units 05/25/25  0510 05/24/25  0749 05/23/25  0533 05/22/25  0535 05/21/25  1303   SODIUM mmol/L 136 135* 134* 135* 134*   POTASSIUM mmol/L 4.5 4.3 4.2 3.5 3.9   CHLORIDE mmol/L 104 99 98 96* 94* "   CO2 mmol/L 25.9 27.9 28.0 30.0* 32.0*   BUN mg/dL 24* 30* 21 30* 27*   CREATININE mg/dL 2.74* 3.22* 2.63* 3.60* 2.89*   CALCIUM mg/dL 8.6 8.4* 8.3* 8.2* 8.5*   GLUCOSE mg/dL 69 107* 109* 50* 110*     Microbiology Results (last 10 days)       Procedure Component Value - Date/Time    Clostridioides difficile Toxin - Stool, Per Rectum [[id number]]  (Abnormal) Collected: 05/25/25 0427    Lab Status: Final result Specimen: Stool from Per Rectum Updated: 05/25/25 0722    Narrative:      The following orders were created for panel order Clostridioides difficile Toxin - Stool, Per Rectum.  Procedure                               Abnormality         Status                     ---------                               -----------         ------                     Clostridioides difficile...[[id number]]  Abnormal            Final result                 Please view results for these tests on the individual orders.    Clostridioides difficile Toxin, PCR - Stool, Per Rectum [[id number]]  (Abnormal) Collected: 05/25/25 0427    Lab Status: Final result Specimen: Stool from Per Rectum Updated: 05/25/25 0722     Toxigenic C. difficile by PCR Positive    Narrative:      DNA from a toxigenic strain of C.difficile has been detected. Antigen testing for the presence of free C.difficile toxin is currently in progress, to help determine the clinical significance of this PCR result.     Clostridioides difficile toxin Ag, Reflex - Stool, Per Rectum [[id number]]  (Normal) Collected: 05/25/25 0427    Lab Status: Final result Specimen: Stool from Per Rectum Updated: 05/25/25 0852     C.diff Toxin Ag Negative    Narrative:      DNA from a toxigenic strain of C.difficile was detected, although the free toxin itself was not detected. These findings are consistent with C.difficile colonization and may not reflect actual C.difficile infection. Clinical correlation needed.    Urine Culture - Urine, Urine, Random Void [[id number]]  (Normal) Collected:  05/22/25 1035    Lab Status: Final result Specimen: Urine, Random Void Updated: 05/23/25 1101     Urine Culture No growth    CANDIDA AURIS PCR - Swab, Axilla Right, Axilla Left and Groin [[id number]]  (Normal) Collected: 05/21/25 1812    Lab Status: Final result Specimen: Swab from Axilla Right, Axilla Left and Groin Updated: 05/23/25 1205     JUDI AURIS PCR Not Detected                Assessment:    Anemia    Essential hypertension    Type 2 diabetes mellitus with circulatory disorder, without long-term current use of insulin    SHASTA (obstructive sleep apnea)    H/O aortic valve replacement with porcine valve    Amputated toe of right foot    S/P CABG x 2    Amputated toe of left foot    Atrial fibrillation, persistent    Persistent atrial fibrillation    CKD (chronic kidney disease) stage 4, GFR 15-29 ml/min    Nocturnal hypoxemia    Unsteady gait when walking    Chronic heart failure with preserved ejection fraction (HFpEF)    GI bleed    Peritoneal dialysis status    Hypoalbuminemia    Moderate protein-calorie malnutrition    Acute blood loss anemia    79-year-old male with  1-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  Patient currently seems to be stable and does not have evidence of active right foot infection or systemic symptoms.  He does have open wound on the right foot that would require care.  2-severe anemia multifactorial management per primary team  3-immobility with evolving decubitus skin changes  4-end-stage renal disease on dialysis per nephrology service via right IJ tunnel catheter  5-immobilization syndrome #6-diabetes  7-peripheral vascular disease  8-other diagnoses per primary team.       CD colitis     Recommendations/Discussions:    DC rocephin  Agree with PO Vanc  Will follow  Thank you       Rekha Delarosa MD  5/25/2025  17:16 EDT

## 2025-05-25 NOTE — ANESTHESIA PREPROCEDURE EVALUATION
Anesthesia Evaluation     Patient summary reviewed and Nursing notes reviewed   NPO Solid Status: > 6 hours  NPO Liquid Status: > 2 hours           Airway   Mallampati: III  TM distance: >3 FB  Neck ROM: full  Dental - normal exam     Pulmonary    (+) ,sleep apnea  (-) COPD, asthma, not a smoker  Cardiovascular     (+) hypertension, valvular problems/murmurs (s/p tissue AVR), dysrhythmias Atrial Fib, CHF Diastolic >=55%, hyperlipidemia,  carotid artery disease carotid bilateral  (-) past MI, angina    ROS comment: LAFB and RBBB on EKG    Echo 5/2025 nl LV/RV fxn, mild-mod MR, bioprosthetic AVR noted  RVSP >55mmHg    Neuro/Psych  (-) seizures, CVA  GI/Hepatic/Renal/Endo    (+) morbid obesity, hiatal hernia, renal disease (TTS HD, HD 5/24 4L removed)- ESRD and dialysis, diabetes mellitus type 2  (-) GERD, liver disease, no thyroid disorder    Musculoskeletal     Abdominal    Substance History      OB/GYN          Other                      Anesthesia Plan    ASA 4     MAC     (Deep sedation/general without secured airway expected by proceduralist )    Anesthetic plan, risks, benefits, and alternatives have been provided, discussed and informed consent has been obtained with: patient.    Plan discussed with attending.      CODE STATUS:    Code Status (Patient has no pulse and is not breathing): CPR (Attempt to Resuscitate)  Medical Interventions (Patient has pulse or is breathing): Full

## 2025-05-25 NOTE — PLAN OF CARE
Goal Outcome Evaluation:  Plan of Care Reviewed With: patient        Progress: no change  Outcome Evaluation: Patient   returned  from  surgery .    Awake  and  alert.. Denies  any  pain. CBI  ongoing. Urine pink.   Patient  had  diarrhea couple  of  times.      order  recieved  for  c-diff test  and  one  time  Imodium.  Awaiting  result.  IV  antibiotics  given.      Oxygen  at  3-4l/m.  SR  on  the  monitor.   Nursing  will  continue to  monitor.

## 2025-05-25 NOTE — PROGRESS NOTES
[facility]: C. Difficile Medication Changes       Pharmacy has been consulted to look over Rock Maciel Jr.'s profile to check patient's medications for changes due to C. Difficile diagnosis per Dr. Christianson's request.       Current C. Diff Regimen:     Assessment/Plan/Changes       1. Proton pump inhibitor: will continue Protonix as patient has h/o GI bleed.   2. Antiperistalic agents or stool softeners/laxatives: will DC prn Dulcolax, Miralax and Pericolace       Thank you for allowing me to participate in your patient's care.  Please call pharmacy with any questions or concerns.     Hemanth Canales, [facility]  Clinical Pharmacist

## 2025-05-25 NOTE — PLAN OF CARE
Problem: Adult Inpatient Plan of Care  Goal: Plan of Care Review  5/25/2025 1852 by Sharita Boss RN  Outcome: Progressing  Flowsheets (Taken 5/25/2025 1852)  Progress: no change  Outcome Evaluation: CBI w/ NS at slow rate. Urine pink, clear. Denies pain or nausea.  Appetite poor. Loose stool, Cdiff positive - started po vanc.  Plan of Care Reviewed With: patient  5/25/2025 1852 by Sharita Boss RN  Outcome: Progressing  Flowsheets (Taken 5/25/2025 1852)  Progress: no change  Outcome Evaluation: CBI w/ NS at slow rate. Urine pink, clear. Denies pain or nausea.  Appetite poor. Loose stool, Cdiff positive - started po vanc.  Plan of Care Reviewed With: patient  Goal: Patient-Specific Goal (Individualized)  5/25/2025 1852 by Sharita Boss RN  Outcome: Progressing  5/25/2025 1852 by Sharita Boss RN  Outcome: Progressing  Goal: Absence of Hospital-Acquired Illness or Injury  5/25/2025 1852 by Sharita Boss RN  Outcome: Progressing  5/25/2025 1852 by Sharita Boss RN  Outcome: Progressing  Intervention: Identify and Manage Fall Risk  Recent Flowsheet Documentation  Taken 5/25/2025 1800 by Sharita Boss RN  Safety Promotion/Fall Prevention:   fall prevention program maintained   safety round/check completed  Taken 5/25/2025 1600 by Sharita Boss RN  Safety Promotion/Fall Prevention:   fall prevention program maintained   safety round/check completed  Taken 5/25/2025 1430 by Sharita Boss RN  Safety Promotion/Fall Prevention:   fall prevention program maintained   safety round/check completed  Taken 5/25/2025 1200 by Sharita Boss RN  Safety Promotion/Fall Prevention: patient off unit  Taken 5/25/2025 1030 by Sharita Boss RN  Safety Promotion/Fall Prevention: patient off unit  Taken 5/25/2025 0900 by Sharita Boss RN  Safety Promotion/Fall Prevention:   fall prevention program maintained   safety round/check completed  Taken 5/25/2025 0800 by Sharita Boss  RN  Safety Promotion/Fall Prevention:   fall prevention program maintained   safety round/check completed  Goal: Optimal Comfort and Wellbeing  5/25/2025 1852 by Sharita Boss RN  Outcome: Progressing  5/25/2025 1852 by Sharita Boss RN  Outcome: Progressing  Goal: Readiness for Transition of Care  5/25/2025 1852 by Sharita Boss RN  Outcome: Progressing  5/25/2025 1852 by Sharita Boss RN  Outcome: Progressing     Problem: Fall Injury Risk  Goal: Absence of Fall and Fall-Related Injury  5/25/2025 1852 by Sharita Boss RN  Outcome: Progressing  5/25/2025 1852 by Sharita Boss RN  Outcome: Progressing  Intervention: Promote Injury-Free Environment  Recent Flowsheet Documentation  Taken 5/25/2025 1800 by Sharita Boss RN  Safety Promotion/Fall Prevention:   fall prevention program maintained   safety round/check completed  Taken 5/25/2025 1600 by Sharita Boss RN  Safety Promotion/Fall Prevention:   fall prevention program maintained   safety round/check completed  Taken 5/25/2025 1430 by Sharita Boss RN  Safety Promotion/Fall Prevention:   fall prevention program maintained   safety round/check completed  Taken 5/25/2025 1200 by Sharita Boss RN  Safety Promotion/Fall Prevention: patient off unit  Taken 5/25/2025 1030 by Sharita Boss RN  Safety Promotion/Fall Prevention: patient off unit  Taken 5/25/2025 0900 by Sharita Boss RN  Safety Promotion/Fall Prevention:   fall prevention program maintained   safety round/check completed  Taken 5/25/2025 0800 by Sharita Boss RN  Safety Promotion/Fall Prevention:   fall prevention program maintained   safety round/check completed     Problem: Skin Injury Risk Increased  Goal: Skin Health and Integrity  5/25/2025 1852 by Sharita Boss RN  Outcome: Progressing  5/25/2025 1852 by Sharita Boss RN  Outcome: Progressing  Intervention: Optimize Skin Protection  Recent Flowsheet Documentation  Taken  5/25/2025 0900 by Sharita Boss RN  Pressure Reduction Devices: specialty bed utilized     Problem: Urinary Retention  Goal: Effective Urinary Elimination  5/25/2025 1852 by Sharita Boss RN  Outcome: Progressing  5/25/2025 1852 by Sharita Boss RN  Outcome: Progressing     Problem: Anemia  Goal: Anemia Symptom Improvement  5/25/2025 1852 by Sharita Boss RN  Outcome: Progressing  5/25/2025 1852 by Sharita Boss RN  Outcome: Progressing  Intervention: Monitor and Manage Anemia  Recent Flowsheet Documentation  Taken 5/25/2025 1800 by Sharita Boss RN  Safety Promotion/Fall Prevention:   fall prevention program maintained   safety round/check completed  Taken 5/25/2025 1600 by Sharita Boss RN  Safety Promotion/Fall Prevention:   fall prevention program maintained   safety round/check completed  Taken 5/25/2025 1430 by Sharita Boss RN  Safety Promotion/Fall Prevention:   fall prevention program maintained   safety round/check completed  Taken 5/25/2025 1200 by Sharita Boss RN  Safety Promotion/Fall Prevention: patient off unit  Taken 5/25/2025 1030 by Sharita Boss RN  Safety Promotion/Fall Prevention: patient off unit  Taken 5/25/2025 0900 by Sharita Boss RN  Safety Promotion/Fall Prevention:   fall prevention program maintained   safety round/check completed  Taken 5/25/2025 0800 by Sharita Boss RN  Safety Promotion/Fall Prevention:   fall prevention program maintained   safety round/check completed     Problem: Pain Acute  Goal: Optimal Pain Control and Function  5/25/2025 1852 by Sharita Boss RN  Outcome: Progressing  5/25/2025 1852 by Sharita Boss RN  Outcome: Progressing     Problem: Wound  Goal: Optimal Coping  5/25/2025 1852 by Sharita Boss RN  Outcome: Progressing  5/25/2025 1852 by Sharita Boss RN  Outcome: Progressing  Goal: Optimal Functional Ability  5/25/2025 1852 by Sharita Boss RN  Outcome:  Progressing  5/25/2025 1852 by Sharita Boss RN  Outcome: Progressing  Goal: Absence of Infection Signs and Symptoms  5/25/2025 1852 by Sharita Boss RN  Outcome: Progressing  5/25/2025 1852 by Sharita Boss RN  Outcome: Progressing  Goal: Improved Oral Intake  5/25/2025 1852 by Sharita Boss RN  Outcome: Progressing  5/25/2025 1852 by Sharita Boss RN  Outcome: Progressing  Goal: Optimal Pain Control and Function  5/25/2025 1852 by Sharita Boss RN  Outcome: Progressing  5/25/2025 1852 by Sharita Boss RN  Outcome: Progressing  Goal: Skin Health and Integrity  5/25/2025 1852 by Sharita Boss RN  Outcome: Progressing  5/25/2025 1852 by Sharita Boss RN  Outcome: Progressing  Intervention: Optimize Skin Protection  Recent Flowsheet Documentation  Taken 5/25/2025 0900 by Sharita Boss RN  Pressure Reduction Devices: specialty bed utilized  Goal: Optimal Wound Healing  5/25/2025 1852 by Sharita Boss RN  Outcome: Progressing  5/25/2025 1852 by Sharita Boss RN  Outcome: Progressing   Goal Outcome Evaluation:  Plan of Care Reviewed With: patient        Progress: no change  Outcome Evaluation: CBI w/ NS at slow rate. Urine pink, clear. Denies pain or nausea.  Appetite poor. Loose stool, Cdiff positive - started po vanc.

## 2025-05-25 NOTE — PROGRESS NOTES
Name: Rock aMciel Jr. ADMIT: 2025   : 1945  PCP: Denise Dickson APRN    MRN: [id number] LOS: 4 days   AGE/SEX: 79 y.o. male  ROOM: [facility]     Subjective   Subjective   Patient is lying in the bed and does not appear to be in major distress.  Denies nausea, vomiting abdominal pain, chest pain.       Objective   Objective   Vital Signs  Temp:  [97.3 °F (36.3 °C)-99.1 °F (37.3 °C)] 98.6 °F (37 °C)  Heart Rate:  [] 82  Resp:  [13-18] 15  BP: ()/(39-73) 123/73  SpO2:  [83 %-99 %] 99 %  on  Flow (L/min) (Oxygen Therapy):  [2-4] 2;   Device (Oxygen Therapy): nasal cannula  Body mass index is 36.9 kg/m².  Physical Exam   HEENT: PERRLA, extract movements intact, Scleras no icterus  Neck: Supple, no JVD  Cardiovascular: Regular rate and rhythm with normal S1 and S2  Respiratory: Fairly clear to auscultation anteriorly with no wheezes  GI: Soft, nontender, bowel sounds are present  Extremities: No edema, palpable pedal pulses  Neurologic: Grossly nonfocal, no facial asymmetry      Results Review     I reviewed the patient's new clinical results.  Results from last 7 days   Lab Units 25  0510 25  0749 25  0533 25  1604 25  0535   WBC 10*3/mm3 13.85* 7.68 6.04  --  5.56   HEMOGLOBIN g/dL 10.1* 8.9* 9.1* 7.6* 6.6*   PLATELETS 10*3/mm3 229 208 222  --  223     Results from last 7 days   Lab Units 25  0510 25  0749 25  0533 25  0535   SODIUM mmol/L 136 135* 134* 135*   POTASSIUM mmol/L 4.5 4.3 4.2 3.5   CHLORIDE mmol/L 104 99 98 96*   CO2 mmol/L 25.9 27.9 28.0 30.0*   BUN mg/dL 24* 30* 21 30*   CREATININE mg/dL 2.74* 3.22* 2.63* 3.60*   GLUCOSE mg/dL 69 107* 109* 50*   EGFR mL/min/1.73 22.7* 18.7* 23.8* 16.4*     Results from last 7 days   Lab Units 25  0510 25  0749 25  0533 25  0535   ALBUMIN g/dL 2.7* 2.8* 2.9* 2.6*   BILIRUBIN mg/dL 0.3 0.3 0.4 0.4   ALK PHOS U/L 91 92 96 77   AST (SGOT) U/L 14 12 13 13   ALT  (SGPT) U/L 11 10 10 9     Results from last 7 days   Lab Units 05/25/25  0510 05/24/25  0749 05/23/25  0533 05/22/25  0535   CALCIUM mg/dL 8.6 8.4* 8.3* 8.2*   ALBUMIN g/dL 2.7* 2.8* 2.9* 2.6*   MAGNESIUM mg/dL  --   --  2.0 2.0   PHOSPHORUS mg/dL 3.6 4.5 3.7  --        Glucose   Date/Time Value Ref Range Status   05/25/2025 1546 68 (L) 70 - 130 mg/dL Final   05/25/2025 0604 72 70 - 130 mg/dL Final   05/24/2025 2014 78 70 - 130 mg/dL Final   05/24/2025 1911 72 70 - 130 mg/dL Final   05/24/2025 1408 88 70 - 130 mg/dL Final   05/24/2025 0602 131 (H) 70 - 130 mg/dL Final   05/23/2025 2102 107 70 - 130 mg/dL Final       No radiology results for the last day    I have personally reviewed all medications:  Scheduled Medications  amiodarone, 200 mg, Oral, Daily  [Held by provider] apixaban, 2.5 mg, Oral, BID  vitamin C, 250 mg, Oral, Daily  atorvastatin, 20 mg, Oral, Nightly  bumetanide, 2 mg, Oral, TID  cefTRIAXone, 2,000 mg, Intravenous, Q24H  cetaphil, , Topical, Q12H  clobetasol propionate, 1 Application, Topical, Q12H  [Held by provider] clopidogrel, 75 mg, Oral, Daily  epoetin vince/vince-epbx, 10,000 Units, Subcutaneous, Once per day on Monday Wednesday Friday  [Held by provider] glipizide, 2.5 mg, Oral, BID AC  hydrALAZINE, 10 mg, Oral, TID  insulin lispro, 2-7 Units, Subcutaneous, 4x Daily AC & at Bedtime  [Held by provider] linagliptin, 5 mg, Oral, Daily  Menthol-Zinc Oxide, 1 Application, Topical, BID  metoprolol succinate XL, 25 mg, Oral, Daily  miconazole, 1 Application, Topical, Q12H  pantoprazole, 40 mg, Intravenous, BID AC  sevelamer, 800 mg, Oral, TID With Meals  sodium chloride, 10 mL, Intravenous, Q12H  sodium chloride, 10 mL, Intravenous, Q12H  sodium chloride, 10 mL, Intravenous, Q12H  sodium hypochlorite, , Topical, BID  tamsulosin, 0.4 mg, Oral, Daily  vancomycin, 125 mg, Oral, Q6H    Infusions  lactated ringers, 30 mL/hr, Last Rate: 30 mL/hr (05/25/25 1002)  Pharmacy Consult,     Diet  Diet:  Regular/House, Cardiac, Diabetic; Healthy Heart (2-3 Na+); Consistent Carbohydrate; Fluid Consistency: Thin (IDDSI 0)    I have personally reviewed:  [x]  Laboratory   [x]  Microbiology   [x]  Radiology   [x]  EKG/Telemetry  [x]  Cardiology/Vascular   []  Pathology    []  Records       Assessment/Plan     Active Hospital Problems    Diagnosis  POA    **Anemia [D64.9]  Yes    Moderate protein-calorie malnutrition [E44.0]  Yes    GI bleed [K92.2]  Yes    Peritoneal dialysis status [Z99.2]  Not Applicable    Hypoalbuminemia [E88.09]  Yes    Acute blood loss anemia [D62]  Unknown    Chronic heart failure with preserved ejection fraction (HFpEF) [I50.32]  Yes    Unsteady gait when walking [R26.81]  Yes    Nocturnal hypoxemia [G47.34]  Yes    CKD (chronic kidney disease) stage 4, GFR 15-29 ml/min [N18.4]  Yes    Persistent atrial fibrillation [I48.19]  Yes    Atrial fibrillation, persistent [I48.19]  Yes    Amputated toe of left foot [S98.132A]  Yes    S/P CABG x 2 [Z95.1]  Not Applicable    Amputated toe of right foot [S98.131A]  Yes    H/O aortic valve replacement with porcine valve [Z95.3]  Not Applicable    Type 2 diabetes mellitus with circulatory disorder, without long-term current use of insulin [E11.59]  Yes    SHASTA (obstructive sleep apnea) [G47.33]  Yes    Essential hypertension [I10]  Yes      Resolved Hospital Problems   No resolved problems to display.       79 y.o. male admitted with Anemia.    1. Anemia/hematuria, urology reevaluated and will undergo CBI.  Hemoglobin initially was 6.6 and received total of 2 units of PRBC and is improved to 9.1>8.9.  Urology did evaluate and underwent cystoscopy with bladder tumor resection and continuous bladder irrigation on 5/24/2025.    2.  ESRD, nephrology did evaluate and will continue with hemodialysis Tuesday Thursday and Saturday.    3.  Chronic atrial fibrillation, Plavix and Eliquis is on hold and will continue with amiodarone.  Continue with metoprolol as well.   Resume Plavix/Eliquis once cleared by urology.    4.  Chronic diastolic heart failure, on metoprolol and Bumex which will be continued.    5.  CAD/CABG/history of aortic valve replacement with porcine valve    6.  Right thigh contact rash, on topical steroids.    7.  Hypertension, on hydralazine, metoprolol.    8.  Diabetes mellitus, on corrective dose insulin which will be continued for the moment.    9.  History of BPH, on terazosin.    10.  Obesity with BMI of 31.86, counseled to lose weight.    11.  Lower right lower extremity ulcer, podiatry did evaluate and no evidence of any infection and recommended to continue local wound care.  Wound VAC is being discontinued and wet-to-dry dressings will be continued.  Nonweightbearing on right lower extremity until cleared by podiatry.    12.  Anemia with heme positive stools, due for EGD today.  Appreciate GI input.    Copy text on this note has been reviewed by me on 5/25/2025    Wesley Christianson MD  [facility]  05/25/25  17:04 EDT

## 2025-05-25 NOTE — PROGRESS NOTES
Patient Name: Rock Maciel Jr.  : 1945  MRN: [id number]  Primary Care Physician: Denise Dickson APRN  Date of admission: 2025    Patient Care Team:  Denise Dickson APRN as PCP - General (Nurse Practitioner)  Azam Banegas Jr., MD as Consulting Physician (Cardiology)  Jamil Stevens MD as Consulting Physician (Nephrology)        Reason for Consult:       KILLIAN/CKD-->HD 5 days/week      Subjective:     Seen and examined, no distress  HD completed  with 2.5L UF,  HD on 25 1 unit pRBCs given  Urinary retention with hematuria, CBI with pink urine, s/p cysto with resection bladder mass on , await pathology  +C Diff  Endoscopy today    Review of systems:    Constitutional:  weakness.  HEENT:  No headache, otalgia, itchy eyes, nasal discharge or sore throat.  Cardiac:  No chest pain, dyspnea, orthopnea or PND.  Chest:              No cough, phlegm or wheezing.  Abdomen:  abdominal pain  Neuro:  No focal weakness, abnormal movements or seizure-like activity.  :   hematuria  ROS was otherwise negative except as mentioned in the [place].       Medications:  Prior to Admission medications    Medication Sig Start Date End Date Taking? Authorizing Provider   amiodarone (PACERONE) 200 MG tablet Take 1 tablet by mouth Daily. 25  Yes Jo Toney MD   apixaban (ELIQUIS) 2.5 MG tablet tablet Take 1 tablet by mouth 2 (Two) Times a Day. Indications: Atrial Fibrillation 11/15/24  Yes Apurva Smith APRN   atorvastatin (LIPITOR) 20 MG tablet Take 1 tablet by mouth Every Night. NEED TO SEE PROVIDER FOR FUTURE REFILLS. 25  Yes Denise Dickson APRN   bisacodyl (DULCOLAX) 10 MG suppository Insert 1 suppository into the rectum Daily As Needed for Constipation.   Yes Sarah, MD Shivam   bumetanide (BUMEX) 2 MG tablet Take 1 tablet by mouth 3 (Three) Times a Day. 3/19/25  Yes Magdalena Diamond MD   clopidogrel (PLAVIX) 75 MG tablet Take 1 tablet by  mouth Daily. 10/16/24  Yes Denise Dickson APRN   diphenhydrAMINE (BENADRYL) 25 mg capsule Take 1 capsule by mouth 3 (Three) Times a Day As Needed for Itching.   Yes ProviderShivam MD   glipizide (GLUCOTROL) 5 MG tablet Take 0.5 tablets by mouth 2 (Two) Times a Day Before Meals. Indications: Type 2 Diabetes 3/19/25  Yes Magdalena Diamond MD   glucose blood (Accu-Chek Keshia Plus) test strip USE TO TEST BLOOD SUGAR    TWICE DAILY FOR DIABETES 7/29/24  Yes Denise Dickson APRN   hydrALAZINE (APRESOLINE) 10 MG tablet Take 1 tablet by mouth 3 (Three) Times a Day. 3/19/25  Yes Magdalena Diamond MD   insulin lispro (HUMALOG/ADMELOG) 100 UNIT/ML injection Inject 2-9 Units under the skin into the appropriate area as directed 4 (Four) Times a Day Before Meals & at Bedtime. 3/19/25  Yes Magdalena Diamond MD   lidocaine (LIDODERM) 5 % Place 1 patch on the skin as directed by provider Daily. Remove & Discard patch within 12 hours or as directed by MD  apply to left shoulder   Yes ProviderShivam MD   linagliptin (Tradjenta) 5 MG tablet tablet Take 1 tablet by mouth Daily. 1/9/25  Yes Denise Dickson APRN   melatonin 5 MG tablet tablet Take 1 tablet by mouth Every Night. 3/19/25  Yes Magdalena Diamond MD   metoprolol succinate XL (TOPROL-XL) 25 MG 24 hr tablet Take 1 tablet by mouth Daily. 11/15/24  Yes Apurva Smith APRN   nitroglycerin (NITROSTAT) 0.4 MG SL tablet Place 1 tablet under the tongue Every 5 (Five) Minutes As Needed for Chest Pain (Only if SBP Greater Than 100). Take no more than 3 doses in 15 minutes. 11/15/24  Yes Apurva Smith APRN   ondansetron ODT (ZOFRAN-ODT) 4 MG disintegrating tablet Take 1 tablet by mouth Every 6 (Six) Hours As Needed for Nausea or Vomiting. 3/19/25  Yes Magdalena Diamond MD   polyethylene glycol (MIRALAX) 17 g packet Take 17 g by mouth Daily.   Yes Provider, MD Shivam   senna 8.6 MG tablet Take 1 tablet by mouth Daily.   Yes Provider,  MD Shivam   sevelamer (RENVELA) 800 MG tablet Take 1 tablet by mouth 3 (Three) Times a Day With Meals.  Patient taking differently: Take 1 tablet by mouth Daily With Lunch. 3/19/25  Yes Magdalena Diamond MD   terazosin (HYTRIN) 2 MG capsule Take 1 capsule by mouth Every Night. 12/27/24  Yes Denise Dickson APRN   traZODone (DESYREL) 50 MG tablet Take 1.5 tablets by mouth Every Night.   Yes Shivam Smith MD   vitamin C (ASCORBIC ACID) 250 MG tablet Take 1 tablet by mouth Daily.   Yes Shivam Smith MD   Zinc 50 MG tablet  2/19/22  Yes Shivam Smith MD   acetaminophen (TYLENOL) 325 MG tablet Take 2 tablets by mouth Every 6 (Six) Hours As Needed for Mild Pain. 11/30/24   Bishop Chakraborty MD   Cholecalciferol 25 MCG (1000 UT) tablet Take 1 tablet by mouth Every 7 (Seven) Days.    ProviderShivam MD     Scheduled Meds:amiodarone, 200 mg, Oral, Daily  [Held by provider] apixaban, 2.5 mg, Oral, BID  vitamin C, 250 mg, Oral, Daily  atorvastatin, 20 mg, Oral, Nightly  bumetanide, 2 mg, Oral, TID  cefTRIAXone, 2,000 mg, Intravenous, Q24H  cetaphil, , Topical, Q12H  clobetasol propionate, 1 Application, Topical, Q12H  [Held by provider] clopidogrel, 75 mg, Oral, Daily  epoetin vince/vince-epbx, 10,000 Units, Subcutaneous, Once per day on Monday Wednesday Friday  [Held by provider] glipizide, 2.5 mg, Oral, BID AC  hydrALAZINE, 10 mg, Oral, TID  insulin lispro, 2-7 Units, Subcutaneous, 4x Daily AC & at Bedtime  [Held by provider] linagliptin, 5 mg, Oral, Daily  Menthol-Zinc Oxide, 1 Application, Topical, BID  metoprolol succinate XL, 25 mg, Oral, Daily  miconazole, 1 Application, Topical, Q12H  pantoprazole, 40 mg, Intravenous, BID AC  sevelamer, 800 mg, Oral, TID With Meals  sodium chloride, 10 mL, Intravenous, Q12H  sodium chloride, 10 mL, Intravenous, Q12H  sodium chloride, 10 mL, Intravenous, Q12H  sodium hypochlorite, , Topical, BID  tamsulosin, 0.4 mg, Oral, Daily  vancomycin,  125 mg, Oral, Q6H      Continuous Infusions:lactated ringers, 30 mL/hr, Last Rate: 30 mL/hr (05/25/25 1002)  Pharmacy Consult,       PRN Meds:  acetaminophen **OR** acetaminophen **OR** acetaminophen    calcium carbonate    camphor-menthol    dextrose    dextrose    glucagon (human recombinant)    heparin    hydrOXYzine    lidocaine    melatonin    nitroglycerin    ondansetron ODT **OR** ondansetron    oxybutynin    oxyCODONE-acetaminophen    Pharmacy Consult    sodium chloride    sodium chloride    sodium chloride    sodium chloride    sodium chloride    sodium chloride    sodium chloride  Allergies:    Allergies   Allergen Reactions    Ceclor [Cefaclor] Rash     Rash in his 50s; he tolerated piperacillin-tazobactam in March 2020       Objective   Exam:     Vital Signs  Temp:  [97.1 °F (36.2 °C)-99.1 °F (37.3 °C)] 98.6 °F (37 °C)  Heart Rate:  [] 82  Resp:  [13-18] 15  BP: ()/(39-73) 123/73  SpO2:  [83 %-99 %] 99 %  on  Flow (L/min) (Oxygen Therapy):  [2-4] 2;   Device (Oxygen Therapy): nasal cannula  Body mass index is 36.9 kg/m².  EXAM  General:  Chronically ill appearing, in no acute distress.    Head:      Normocephalic and atraumatic.    Eyes:      PERRL/EOM intact, conjunctivae and sclerae clear without nystagmus.    Neck:      No masses, thyromegaly,  trachea central   Lungs:    Clear bilaterally to auscultation.    Heart:      Regular rate and rhythm, no murmur no gallop  Abd:        Soft, nontender, not distended, bowel sounds positive, no shifting dullness.  Msk:        No deformity or scoliosis noted of thoracic or lumbar spine.    Pulses:   Pulses normal in all 4 extremities.    Extremities:    Wound vac right foot  Neuro:    No focal deficits.   alert oriented x3  Skin:       Intact without lesions or rashes.    Psych:    Alert and cooperative; normal mood and affect; normal attention span     Access: [facility]  : abdi in place with CBI, pink urine    Results Review:  I have personally  reviewed most recent Data :  [facility]  CBC    Results from last 7 days   Lab Units 05/25/25  0510 05/24/25  0749 05/23/25  0533 05/22/25  1604 05/22/25  0535 05/21/25  2157 05/21/25  1303   WBC 10*3/mm3 13.85* 7.68 6.04  --  5.56  --  5.94   HEMOGLOBIN g/dL 10.1* 8.9* 9.1* 7.6* 6.6* 7.1* 6.6*   PLATELETS 10*3/mm3 229 208 222  --  223  --  294     CMP   Results from last 7 days   Lab Units 05/25/25  0510 05/24/25  0749 05/23/25  0533 05/22/25  0535 05/21/25  1303   SODIUM mmol/L 136 135* 134* 135* 134*   POTASSIUM mmol/L 4.5 4.3 4.2 3.5 3.9   CHLORIDE mmol/L 104 99 98 96* 94*   CO2 mmol/L 25.9 27.9 28.0 30.0* 32.0*   BUN mg/dL 24* 30* 21 30* 27*   CREATININE mg/dL 2.74* 3.22* 2.63* 3.60* 2.89*   GLUCOSE mg/dL 69 107* 109* 50* 110*   ALBUMIN g/dL 2.7* 2.8* 2.9* 2.6* 2.9*   BILIRUBIN mg/dL 0.3 0.3 0.4 0.4 0.3   ALK PHOS U/L 91 92 96 77 92   AST (SGOT) U/L 14 12 13 13 12   ALT (SGPT) U/L 11 10 10 9 9     ABG      No radiology results for the last 7 days      Results for orders placed during the hospital encounter of 05/21/25    Adult Transthoracic Echo Complete W/ Cont if Necessary Per Protocol    Interpretation Summary    Left ventricular ejection fraction appears to be 56 - 60%.    Left ventricular diastolic function was normal.    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 Mild transvalvular regurgitation is present in the prosthetic aortic valve.    Estimated right ventricular systolic pressure from tricuspid regurgitation is markedly elevated (>55 mmHg).        Assessment & Plan   Assessment and Plan:         Anemia    Essential hypertension    Type 2 diabetes mellitus with circulatory disorder, without long-term current use of insulin    SHASTA (obstructive sleep apnea)    H/O aortic valve replacement with porcine valve    Amputated toe of right foot    S/P CABG x 2    Amputated toe of left foot    Atrial fibrillation, persistent    Persistent atrial fibrillation    CKD (chronic kidney  disease) stage 4, GFR 15-29 ml/min    Nocturnal hypoxemia    Unsteady gait when walking    Chronic heart failure with preserved ejection fraction (HFpEF)    GI bleed    Peritoneal dialysis status    Hypoalbuminemia    Moderate protein-calorie malnutrition    Acute blood loss anemia    ASSESSMENT:  CKD with hospitalization in March 2025 requiring HD due to KILLIAN likely ATN 2/2 rhabdomyolysis, prerenal insult related to diuretics. OM, now on next stage HD 5 days per week (MTWTF) with RIJ TDC  Acute TYLER, hematuria  Urinary retention  Bladder mass s/p resection  A-fib with controlled rates.  History of HFpEF with pulmonary hypertension  Recent osteomyelitis and abscess right foot  T2DM  HTN  A-fib  CAD  PVD     TTE 5/23/25 with EF 56-60%, mild transvalvular regurgitation in prosthetic AV, RVSP >55 mmHg    PLAN :     Continue TTS schedule while inpatient, then resume next stage 5 day/week HD when discharged.   Chemistry largely stable  Received pRBCs, trend H&H, iron profile with elevated ferritin. On retacrit  On bumex 2mg po TID  Urology following for urinary retention, hematuria s/p cysto with clot evacuation and resection of bladder mass 5/24, CBI, await pathology  Podiatry following per right foot wound, healing well, ID input noted.   GI following, plans for upper endoscopy 5/25  Urine cultures no growth, C Diff positive on po vanc  Will monitor and coordinate with team    Note transcribed for Dr Sadaf Turner, APRN  [facility]  5/25/2025  12:35 EDT

## 2025-05-25 NOTE — PROGRESS NOTES
79-year-old male with gross hematuria on CBI.  Also on dialysis d/t ESRD; followed by nephrology.Thinners on hold for now. Patient is s/p cysto w/clot evacuation with resection of bladder mass, path pending. Tested positive for C. Diff this morning.      Patient lying in bed preparing for transport to endoscopy for EGD today. Reports mild discomfort at abdi catheter site.  Vital signs stable.      Labs 05/25/2025   Creatinine - 2.74 (3.22)  GFR - 227 (18.7)  Hg - 10.1 (8.9)  Hct - 32.4 (29.4)  WBC 13.85 (7.68)    Abdomen soft and nontender.  CBI running with light red urine present this morning     PLAN;  Recommend to continue CBI until urine light pink  Recommend to continue Flomax daily  Continue abdi catheter care   Await pathology results from bladder mass resection for further management.   Will continue to follow

## 2025-05-26 LAB
ALBUMIN SERPL-MCNC: 2.4 G/DL (ref 3.5–5.2)
ALBUMIN/GLOB SERPL: 0.6 G/DL
ALP SERPL-CCNC: 89 U/L (ref 39–117)
ALT SERPL W P-5'-P-CCNC: 10 U/L (ref 1–41)
ANION GAP SERPL CALCULATED.3IONS-SCNC: 9.6 MMOL/L (ref 5–15)
AST SERPL-CCNC: 13 U/L (ref 1–40)
BASOPHILS # BLD AUTO: 0.04 10*3/MM3 (ref 0–0.2)
BASOPHILS NFR BLD AUTO: 0.4 % (ref 0–1.5)
BILIRUB SERPL-MCNC: 0.2 MG/DL (ref 0–1.2)
BUN SERPL-MCNC: 32 MG/DL (ref 8–23)
BUN/CREAT SERPL: 8.8 (ref 7–25)
CALCIUM SPEC-SCNC: 8.4 MG/DL (ref 8.6–10.5)
CHLORIDE SERPL-SCNC: 103 MMOL/L (ref 98–107)
CO2 SERPL-SCNC: 24.4 MMOL/L (ref 22–29)
CREAT SERPL-MCNC: 3.64 MG/DL (ref 0.76–1.27)
DEPRECATED RDW RBC AUTO: 52.4 FL (ref 37–54)
EGFRCR SERPLBLD CKD-EPI 2021: 16.1 ML/MIN/1.73
EOSINOPHIL # BLD AUTO: 0.56 10*3/MM3 (ref 0–0.4)
EOSINOPHIL NFR BLD AUTO: 5.8 % (ref 0.3–6.2)
ERYTHROCYTE [DISTWIDTH] IN BLOOD BY AUTOMATED COUNT: 15.7 % (ref 12.3–15.4)
GLOBULIN UR ELPH-MCNC: 3.8 GM/DL
GLUCOSE BLDC GLUCOMTR-MCNC: 128 MG/DL (ref 70–130)
GLUCOSE BLDC GLUCOMTR-MCNC: 132 MG/DL (ref 70–130)
GLUCOSE BLDC GLUCOMTR-MCNC: 168 MG/DL (ref 70–130)
GLUCOSE BLDC GLUCOMTR-MCNC: 71 MG/DL (ref 70–130)
GLUCOSE SERPL-MCNC: 70 MG/DL (ref 65–99)
HCT VFR BLD AUTO: 26.5 % (ref 37.5–51)
HGB BLD-MCNC: 8.8 G/DL (ref 13–17.7)
IMM GRANULOCYTES # BLD AUTO: 0.04 10*3/MM3 (ref 0–0.05)
IMM GRANULOCYTES NFR BLD AUTO: 0.4 % (ref 0–0.5)
LYMPHOCYTES # BLD AUTO: 0.74 10*3/MM3 (ref 0.7–3.1)
LYMPHOCYTES NFR BLD AUTO: 7.7 % (ref 19.6–45.3)
MCH RBC QN AUTO: 30.6 PG (ref 26.6–33)
MCHC RBC AUTO-ENTMCNC: 33.2 G/DL (ref 31.5–35.7)
MCV RBC AUTO: 92 FL (ref 79–97)
MONOCYTES # BLD AUTO: 0.67 10*3/MM3 (ref 0.1–0.9)
MONOCYTES NFR BLD AUTO: 7 % (ref 5–12)
NEUTROPHILS NFR BLD AUTO: 7.53 10*3/MM3 (ref 1.7–7)
NEUTROPHILS NFR BLD AUTO: 78.7 % (ref 42.7–76)
NRBC BLD AUTO-RTO: 0 /100 WBC (ref 0–0.2)
PHOSPHATE SERPL-MCNC: 4 MG/DL (ref 2.5–4.5)
PLATELET # BLD AUTO: 219 10*3/MM3 (ref 140–450)
PMV BLD AUTO: 9.3 FL (ref 6–12)
POTASSIUM SERPL-SCNC: 4.2 MMOL/L (ref 3.5–5.2)
PROT SERPL-MCNC: 6.2 G/DL (ref 6–8.5)
RBC # BLD AUTO: 2.88 10*6/MM3 (ref 4.14–5.8)
SODIUM SERPL-SCNC: 137 MMOL/L (ref 136–145)
WBC NRBC COR # BLD AUTO: 9.58 10*3/MM3 (ref 3.4–10.8)

## 2025-05-26 PROCEDURE — 84100 ASSAY OF PHOSPHORUS: CPT | Performed by: UROLOGY

## 2025-05-26 PROCEDURE — 36415 COLL VENOUS BLD VENIPUNCTURE: CPT | Performed by: UROLOGY

## 2025-05-26 PROCEDURE — 85025 COMPLETE CBC W/AUTO DIFF WBC: CPT | Performed by: UROLOGY

## 2025-05-26 PROCEDURE — 82948 REAGENT STRIP/BLOOD GLUCOSE: CPT

## 2025-05-26 PROCEDURE — 80053 COMPREHEN METABOLIC PANEL: CPT | Performed by: UROLOGY

## 2025-05-26 PROCEDURE — 25010000002 EPOETIN ALFA-EPBX 10000 UNIT/ML SOLUTION: Performed by: UROLOGY

## 2025-05-26 RX ADMIN — VANCOMYCIN HYDROCHLORIDE 125 MG: 125 CAPSULE ORAL at 16:57

## 2025-05-26 RX ADMIN — AMIODARONE HYDROCHLORIDE 200 MG: 200 TABLET ORAL at 09:08

## 2025-05-26 RX ADMIN — SEVELAMER CARBONATE 800 MG: 800 TABLET, FILM COATED ORAL at 09:08

## 2025-05-26 RX ADMIN — PANTOPRAZOLE SODIUM 40 MG: 40 INJECTION, POWDER, FOR SOLUTION INTRAVENOUS at 16:54

## 2025-05-26 RX ADMIN — SEVELAMER CARBONATE 800 MG: 800 TABLET, FILM COATED ORAL at 16:56

## 2025-05-26 RX ADMIN — VANCOMYCIN HYDROCHLORIDE 125 MG: 125 CAPSULE ORAL at 11:49

## 2025-05-26 RX ADMIN — Medication 10 ML: at 20:47

## 2025-05-26 RX ADMIN — SEVELAMER CARBONATE 800 MG: 800 TABLET, FILM COATED ORAL at 11:49

## 2025-05-26 RX ADMIN — PANTOPRAZOLE SODIUM 40 MG: 40 INJECTION, POWDER, FOR SOLUTION INTRAVENOUS at 06:41

## 2025-05-26 RX ADMIN — CLOBETASOL PROPIONATE CREAM USP, 0.05% 1 APPLICATION: 0.5 CREAM TOPICAL at 09:09

## 2025-05-26 RX ADMIN — Medication: at 20:47

## 2025-05-26 RX ADMIN — CLOBETASOL PROPIONATE CREAM USP, 0.05% 1 APPLICATION: 0.5 CREAM TOPICAL at 20:47

## 2025-05-26 RX ADMIN — CAMPHOR, MENTHOL: .5; .5 LOTION TOPICAL at 09:09

## 2025-05-26 RX ADMIN — TAMSULOSIN HYDROCHLORIDE 0.4 MG: 0.4 CAPSULE ORAL at 09:08

## 2025-05-26 RX ADMIN — MENTHOL, ZINC OXIDE 1 APPLICATION: .44; 20.6 OINTMENT TOPICAL at 20:48

## 2025-05-26 RX ADMIN — HYDRALAZINE HYDROCHLORIDE 10 MG: 10 TABLET ORAL at 09:08

## 2025-05-26 RX ADMIN — BUMETANIDE 2 MG: 1 TABLET ORAL at 16:54

## 2025-05-26 RX ADMIN — BUMETANIDE 2 MG: 1 TABLET ORAL at 11:49

## 2025-05-26 RX ADMIN — ATORVASTATIN CALCIUM 20 MG: 20 TABLET, FILM COATED ORAL at 20:46

## 2025-05-26 RX ADMIN — DAKIN'S SOLUTION 0.125% (QUARTER STRENGTH): 0.12 SOLUTION at 20:47

## 2025-05-26 RX ADMIN — Medication 10 ML: at 09:08

## 2025-05-26 RX ADMIN — Medication: at 09:10

## 2025-05-26 RX ADMIN — METOPROLOL SUCCINATE 25 MG: 25 TABLET, EXTENDED RELEASE ORAL at 09:08

## 2025-05-26 RX ADMIN — VANCOMYCIN HYDROCHLORIDE 125 MG: 125 CAPSULE ORAL at 06:41

## 2025-05-26 RX ADMIN — EPOETIN ALFA-EPBX 10000 UNITS: 10000 INJECTION, SOLUTION INTRAVENOUS; SUBCUTANEOUS at 09:10

## 2025-05-26 RX ADMIN — OXYCODONE HYDROCHLORIDE AND ACETAMINOPHEN 250 MG: 500 TABLET ORAL at 09:08

## 2025-05-26 RX ADMIN — HYDROXYZINE HYDROCHLORIDE 25 MG: 25 TABLET, FILM COATED ORAL at 20:46

## 2025-05-26 RX ADMIN — ANTI-FUNGAL POWDER MICONAZOLE NITRATE TALC FREE 1 APPLICATION: 1.42 POWDER TOPICAL at 20:48

## 2025-05-26 RX ADMIN — BUMETANIDE 2 MG: 1 TABLET ORAL at 06:41

## 2025-05-26 RX ADMIN — Medication 5 MG: at 20:46

## 2025-05-26 RX ADMIN — DAKIN'S SOLUTION 0.125% (QUARTER STRENGTH): 0.12 SOLUTION at 09:10

## 2025-05-26 RX ADMIN — MENTHOL, ZINC OXIDE 1 APPLICATION: .44; 20.6 OINTMENT TOPICAL at 09:10

## 2025-05-26 RX ADMIN — ANTI-FUNGAL POWDER MICONAZOLE NITRATE TALC FREE 1 APPLICATION: 1.42 POWDER TOPICAL at 09:10

## 2025-05-26 NOTE — PLAN OF CARE
Goal Outcome Evaluation:      Pt resting in bed quietly. Denies pain. Turned. Very small bm this shift. Poor appetite. Pt encouraged to eat and drink.

## 2025-05-26 NOTE — PROGRESS NOTES
"Nutrition Services    Patient Name: Rock Maciel Jr.  YOB: 1945  MRN: [id number]  Admission date: 5/21/2025    PROGRESS NOTE      Encounter Information: Follow up       PO Diet: Diet: Regular/House, Cardiac, Diabetic; Healthy Heart (2-3 Na+); Consistent Carbohydrate; Fluid Consistency: Thin (IDDSI 0)   PO Supplements: Boost Glucose Control, Daily   PO Intake:  %       Current nutrition support: --   Nutrition support review: --       Weight: Weight: 120 kg (264 lb 8.8 oz) (05/26/25 0649)       Medications: reviewed   Labs: reviewed Glu 132, Bun 36, cr 3.64       GI Function:  last bowel movement: 5/25       Nutrition Intervention Updates: Will continue to monitor po and encourage good intake.  Provide supplement daily  RD to follow       Results from last 7 days   Lab Units 05/26/25  0540 05/25/25  0510 05/24/25  0749   SODIUM mmol/L 137 136 135*   POTASSIUM mmol/L 4.2 4.5 4.3   CHLORIDE mmol/L 103 104 99   CO2 mmol/L 24.4 25.9 27.9   BUN mg/dL 32* 24* 30*   CREATININE mg/dL 3.64* 2.74* 3.22*   CALCIUM mg/dL 8.4* 8.6 8.4*   BILIRUBIN mg/dL 0.2 0.3 0.3   ALK PHOS U/L 89 91 92   ALT (SGPT) U/L 10 11 10   AST (SGOT) U/L 13 14 12   GLUCOSE mg/dL 70 69 107*     Results from last 7 days   Lab Units 05/26/25  0540 05/24/25  0749 05/23/25  0533 05/22/25  0839 05/22/25  0535   MAGNESIUM mg/dL  --   --  2.0  --  2.0   PHOSPHORUS mg/dL 4.0   < > 3.7  --   --    HEMOGLOBIN g/dL 8.8*   < > 9.1*   < > 6.6*   HEMATOCRIT % 26.5*   < > 28.1*   < > 19.5*    < > = values in this interval not displayed.     No results found for: \"COVID19\"  Lab Results   Component Value Date    HGBA1C 6.00 (H) 03/03/2025       RD to follow up per protocol.    Electronically signed by:  Mady Dobbs RD  05/26/25 17:25 EDT  "

## 2025-05-26 NOTE — PROGRESS NOTES
79-year-old male with gross hematuria on CBI.  ESRD, on dialysis; followed by nephrology.Thinners on hold for now. Patient is s/p cysto w/clot evacuation with resection of bladder mass, path pending. Plavix and Eliquis still on hold.     Patient lying in bed watching television. Reports mild discomfort at abdi catheter site.  Vital signs stable. CBI on low flow, urine clear.       Labs 05/26/2025   Creatinine - 3.64  GFR - 16.1  Hg - 8.8  Hct - 26.5  WBC 9.58     On PO Vancomycin     PLAN;  Keep abdi catheter for now / CBI clamped  Continue abdi cathteter care.   Recommend to continue Flomax daily  May restart Eliquis from Urology standpoint if cleared by GI to restart.  Await pathology results from bladder mass resection for further management.   Will continue to follow

## 2025-05-26 NOTE — PLAN OF CARE
Goal Outcome Evaluation:  Plan of Care Reviewed With: patient        Progress: improving  Outcome Evaluation: Pt AOx4. On 2L NC. NSR w/ BBB and 1st AVB on monitor. Bladder irrigation stopped and capped off- urine dark yellow w/ some sediment no bleeding noted. Leaving catheter in per urology. Nephro following and HD to be ordered tomorrow (tue/thur/sat). On PO vanc for cdiff- no BMs yet at this time. Wound and skin care per orders- wounds/ skin looking a lot better. Q2 turns. HGB stable no s/s bleeding. Feeling tired today after last 2 days of procedures and anesthesia. Pt not in acute distress. Denies pain at this time. Plan of care ongoing                              HPI by Wendy ART:  Ms. Wendy Viveros is a 68 y.o. female, with PMH of HTN, T2DM, CKD-3, Anemia, chronic gout, prior CVA w/ right-sided weakness in 2021, who [place] [facility] [place] due to progressive shortness of breath x 2 weeks. She notes associated b/l lower extremity edema and it worsens with exertion, she notes difficulty walking 2/2 leg swelling, she spends most of her time in bed, but her shortness of breath is worse when she is laying flat. She uses 4 pillows to prop herself up while sleeping. She states she used to take lasix, but does not currently. She states she has not followed with a doctor in recent years. She denied chest pain. She was evaluated in the ED with labs showing anemia with H&H of 9.4/27.6, and PLT of 140K. A metabolic panel showed elevated total bilirubin of 3.9. A BNP was elevated at 2536, with troponin of 0.051. A CXR showed increaed cardiac silhouette, but did not should pulmonary vascular congestion. She was treated in the ED with 40 mg IV lasix. She was admitted to inpatient status.

## 2025-05-26 NOTE — PROGRESS NOTES
Name: Rock Maciel Jr. ADMIT: 2025   : 1945  PCP: Denise Dickson APRN    MRN: [id number] LOS: 5 days   AGE/SEX: 79 y.o. male  ROOM: [facility]     Subjective   Subjective   Patient is lying in the bed and does not appear to be in major distress.  Denies nausea, vomiting abdominal pain, chest pain, shortness of breath.  Currently undergoing CBI.       Objective   Objective   Vital Signs  Temp:  [98.4 °F (36.9 °C)-98.5 °F (36.9 °C)] 98.4 °F (36.9 °C)  Heart Rate:  [69-77] 77  Resp:  [16] 16  BP: ()/(44-59) 136/58  SpO2:  [97 %-98 %] 97 %  on  Flow (L/min) (Oxygen Therapy):  [2-2.5] 2;   Device (Oxygen Therapy): nasal cannula  Body mass index is 35.88 kg/m².  Physical Exam   HEENT: PERRLA, extract movements intact, Scleras no icterus  Neck: Supple, no JVD  Cardiovascular: Regular rate and rhythm with normal S1 and S2  Respiratory: Fairly clear to auscultation anteriorly with no wheezes  GI: Soft, nontender, bowel sounds are present  Extremities: No edema, palpable pedal pulses  Neurologic: Grossly nonfocal, no facial asymmetry      Results Review     I reviewed the patient's new clinical results.  Results from last 7 days   Lab Units 25  0540 25  0510 25  0749 25  0533   WBC 10*3/mm3 9.58 13.85* 7.68 6.04   HEMOGLOBIN g/dL 8.8* 10.1* 8.9* 9.1*   PLATELETS 10*3/mm3 219 229 208 222     Results from last 7 days   Lab Units 25  0540 25  0510 25  0749 25  0533   SODIUM mmol/L 137 136 135* 134*   POTASSIUM mmol/L 4.2 4.5 4.3 4.2   CHLORIDE mmol/L 103 104 99 98   CO2 mmol/L 24.4 25.9 27.9 28.0   BUN mg/dL 32* 24* 30* 21   CREATININE mg/dL 3.64* 2.74* 3.22* 2.63*   GLUCOSE mg/dL 70 69 107* 109*   EGFR mL/min/1.73 16.1* 22.7* 18.7* 23.8*     Results from last 7 days   Lab Units 25  0540 25  0510 25  0749 25  0533   ALBUMIN g/dL 2.4* 2.7* 2.8* 2.9*   BILIRUBIN mg/dL 0.2 0.3 0.3 0.4   ALK PHOS U/L 89 91 92 96   AST (SGOT) U/L 13 14  12 13   ALT (SGPT) U/L 10 11 10 10     Results from last 7 days   Lab Units 05/26/25  0540 05/25/25  0510 05/24/25  0749 05/23/25  0533 05/22/25  0535   CALCIUM mg/dL 8.4* 8.6 8.4* 8.3* 8.2*   ALBUMIN g/dL 2.4* 2.7* 2.8* 2.9* 2.6*   MAGNESIUM mg/dL  --   --   --  2.0 2.0   PHOSPHORUS mg/dL 4.0 3.6 4.5 3.7  --        Glucose   Date/Time Value Ref Range Status   05/26/2025 1535 132 (H) 70 - 130 mg/dL Final   05/26/2025 1039 128 70 - 130 mg/dL Final   05/26/2025 0646 71 70 - 130 mg/dL Final   05/25/2025 2016 98 70 - 130 mg/dL Final   05/25/2025 1546 68 (L) 70 - 130 mg/dL Final   05/25/2025 0604 72 70 - 130 mg/dL Final   05/24/2025 2014 78 70 - 130 mg/dL Final       No radiology results for the last day    I have personally reviewed all medications:  Scheduled Medications  amiodarone, 200 mg, Oral, Daily  [Held by provider] apixaban, 2.5 mg, Oral, BID  vitamin C, 250 mg, Oral, Daily  atorvastatin, 20 mg, Oral, Nightly  bumetanide, 2 mg, Oral, TID  cetaphil, , Topical, Q12H  clobetasol propionate, 1 Application, Topical, Q12H  [Held by provider] clopidogrel, 75 mg, Oral, Daily  epoetin vince/vince-epbx, 10,000 Units, Subcutaneous, Once per day on Monday Wednesday Friday  [Held by provider] glipizide, 2.5 mg, Oral, BID AC  hydrALAZINE, 10 mg, Oral, TID  insulin lispro, 2-7 Units, Subcutaneous, 4x Daily AC & at Bedtime  [Held by provider] linagliptin, 5 mg, Oral, Daily  Menthol-Zinc Oxide, 1 Application, Topical, BID  metoprolol succinate XL, 25 mg, Oral, Daily  miconazole, 1 Application, Topical, Q12H  pantoprazole, 40 mg, Intravenous, BID AC  sevelamer, 800 mg, Oral, TID With Meals  sodium chloride, 10 mL, Intravenous, Q12H  sodium chloride, 10 mL, Intravenous, Q12H  sodium chloride, 10 mL, Intravenous, Q12H  sodium hypochlorite, , Topical, BID  tamsulosin, 0.4 mg, Oral, Daily  vancomycin, 125 mg, Oral, Q6H    Infusions  Pharmacy Consult,     Diet  Diet: Regular/House, Cardiac, Diabetic; Healthy Heart (2-3 Na+); Consistent  Carbohydrate; Fluid Consistency: Thin (IDDSI 0)    I have personally reviewed:  [x]  Laboratory   [x]  Microbiology   [x]  Radiology   [x]  EKG/Telemetry  [x]  Cardiology/Vascular   []  Pathology    []  Records       Assessment/Plan     Active Hospital Problems    Diagnosis  POA    **Anemia [D64.9]  Yes    Moderate protein-calorie malnutrition [E44.0]  Yes    GI bleed [K92.2]  Yes    Peritoneal dialysis status [Z99.2]  Not Applicable    Hypoalbuminemia [E88.09]  Yes    Acute blood loss anemia [D62]  Unknown    Chronic heart failure with preserved ejection fraction (HFpEF) [I50.32]  Yes    Unsteady gait when walking [R26.81]  Yes    Nocturnal hypoxemia [G47.34]  Yes    CKD (chronic kidney disease) stage 4, GFR 15-29 ml/min [N18.4]  Yes    Persistent atrial fibrillation [I48.19]  Yes    Atrial fibrillation, persistent [I48.19]  Yes    Amputated toe of left foot [S98.132A]  Yes    S/P CABG x 2 [Z95.1]  Not Applicable    Amputated toe of right foot [S98.131A]  Yes    H/O aortic valve replacement with porcine valve [Z95.3]  Not Applicable    Type 2 diabetes mellitus with circulatory disorder, without long-term current use of insulin [E11.59]  Yes    SHASTA (obstructive sleep apnea) [G47.33]  Yes    Essential hypertension [I10]  Yes      Resolved Hospital Problems   No resolved problems to display.       79 y.o. male admitted with Anemia.    1. Anemia/hematuria, urology reevaluated and will undergo CBI.  Hemoglobin initially was 6.6 and received total of 2 units of PRBC and is improved to 9.1>8.9>8.8.  Urology did evaluate and underwent cystoscopy with bladder tumor resection and continuous bladder irrigation on 5/24/2025.  Await for biopsy results.    2.  ESRD, nephrology did evaluate and will continue with hemodialysis Tuesday Thursday and Saturday.    3.  Chronic atrial fibrillation, Plavix and Eliquis is on hold and will continue with amiodarone.  Continue with metoprolol as well.  Resume Plavix/Eliquis once cleared by  urology.    4.  Chronic diastolic heart failure, on metoprolol and Bumex which will be continued.    5.  CAD/CABG/history of aortic valve replacement with porcine valve    6.  Right thigh contact rash, on topical steroids.    7.  Hypertension, on hydralazine, metoprolol.    8.  Diabetes mellitus, on corrective dose insulin which will be continued for the moment.    9.  History of BPH, on terazosin.    10.  Obesity with BMI of 31.86, counseled to lose weight.    11.  Right  lower extremity ulcer, podiatry did evaluate and no evidence of any infection and recommended to continue local wound care.  Wound VAC is being discontinued and wet-to-dry dressings will be continued.  Nonweightbearing on right lower extremity until cleared by podiatry.    12.  Anemia with heme positive stools, GI did evaluate and underwent EGD on 5/25 which revealed irregular Z-line and portal hypertensive gastropathy but no evidence of any bleeding or stigmata of recent bleed.    Copy text on this note has been reviewed by me on 5/26/2025    Wesley Christianson MD  [facility]  05/26/25  16:31 EDT

## 2025-05-26 NOTE — PROGRESS NOTES
"      FOLLOW UP NOTE      Name: Rock Maciel Jr. ADMIT: 2025   : 1945  PCP: Denise Dickson APRN    MRN: [id number] LOS: 5 days   AGE/SEX: 79 y.o. male  ROOM: E657/1     Date of Service: 2025                           CHIEF COMPLIANTS / REASON FOR FOLLOW UP          KILLIAN/CKD NXT stage 5 days/week    Subjective:      Patient seen and examined  No distress, no concerns.  Last HD 2025, UF of 4  EGD status post yesterday, mild portal hypertensive gastropathy  Plan for HD tomorrow         Review of Systems:     10  Constitutional: No fever, no chills, no lethargy, no weakness.  HEENT:  No headache, otalgia, itchy eyes, nasal discharge or sore throat.  Cardiac:  No chest pain, dyspnea, orthopnea or PND.  Chest:  No cough, phlegm or wheezing.  Abdomen:  No abdominal pain, nausea or vomiting.  Neuro:  No focal weakness, abnormal movements or seizure-like activity.  :   No hematuria, no pyuria, no dysuria, no flank pain.  Extremities:  No  joint pains.  ROS was otherwise negative except as mentioned in the [place].        OBJECTIVE                                                                        Exam:  /59 (BP Location: Right arm, Patient Position: Lying)   Pulse 77   Temp 98.5 °F (36.9 °C) (Oral)   Resp 16   Ht 182.9 cm (72\")   Wt 120 kg (264 lb 8.8 oz)   SpO2 98%   BMI 35.88 kg/m²   Intake/Output last 3 shifts:  I/O last 3 completed shifts:  In: 240 [P.O.:240]  Out: 550 [Urine:550]  Intake/Output this shift:  I/O this shift:  In: 120 [P.O.:120]  Out: -   General:  Chronically ill appearing, in no acute distress.    Head:      Normocephalic and atraumatic.    Eyes:      PERRL/EOM intact, conjunctivae and sclerae clear without nystagmus.    Neck:      No masses, thyromegaly,  trachea central   Lungs:    Clear bilaterally to auscultation.    Heart:      Regular rate and rhythm, no murmur no gallop  Abd:        Soft, nontender, not distended, bowel sounds positive, no shifting " dullness.  Msk:        No deformity or scoliosis noted of thoracic or lumbar spine.    Pulses:   Pulses normal in all 4 extremities.    Extremities:    Wound vac right foot  Neuro:    No focal deficits.   alert oriented x3  Skin:       Intact without lesions or rashes.    Psych:    Alert and cooperative; normal mood and affect; normal attention span     Access: [facility]  : abdi in place with CBI, pink urine    Scheduled Meds:amiodarone, 200 mg, Oral, Daily  [Held by provider] apixaban, 2.5 mg, Oral, BID  vitamin C, 250 mg, Oral, Daily  atorvastatin, 20 mg, Oral, Nightly  bumetanide, 2 mg, Oral, TID  cetaphil, , Topical, Q12H  clobetasol propionate, 1 Application, Topical, Q12H  [Held by provider] clopidogrel, 75 mg, Oral, Daily  epoetin vince/vince-epbx, 10,000 Units, Subcutaneous, Once per day on Monday Wednesday Friday  [Held by provider] glipizide, 2.5 mg, Oral, BID AC  hydrALAZINE, 10 mg, Oral, TID  insulin lispro, 2-7 Units, Subcutaneous, 4x Daily AC & at Bedtime  [Held by provider] linagliptin, 5 mg, Oral, Daily  Menthol-Zinc Oxide, 1 Application, Topical, BID  metoprolol succinate XL, 25 mg, Oral, Daily  miconazole, 1 Application, Topical, Q12H  pantoprazole, 40 mg, Intravenous, BID AC  sevelamer, 800 mg, Oral, TID With Meals  sodium chloride, 10 mL, Intravenous, Q12H  sodium chloride, 10 mL, Intravenous, Q12H  sodium chloride, 10 mL, Intravenous, Q12H  sodium hypochlorite, , Topical, BID  tamsulosin, 0.4 mg, Oral, Daily  vancomycin, 125 mg, Oral, Q6H      Continuous Infusions:lactated ringers, 30 mL/hr, Last Rate: Stopped (05/26/25 0910)  Pharmacy Consult,       PRN Meds:  acetaminophen **OR** acetaminophen **OR** acetaminophen    calcium carbonate    camphor-menthol    dextrose    dextrose    glucagon (human recombinant)    heparin    hydrOXYzine    lidocaine    melatonin    nitroglycerin    ondansetron ODT **OR** ondansetron    oxybutynin    oxyCODONE-acetaminophen    Pharmacy Consult    sodium chloride     sodium chloride    sodium chloride    sodium chloride    sodium chloride    sodium chloride    sodium chloride         Data Review:                                                                           Labs reviewed        Imaging:                                                                           Radiology reviewed           ASSESSMENT:                                                                                Anemia    Essential hypertension    Type 2 diabetes mellitus with circulatory disorder, without long-term current use of insulin    SHASTA (obstructive sleep apnea)    H/O aortic valve replacement with porcine valve    Amputated toe of right foot    S/P CABG x 2    Amputated toe of left foot    Atrial fibrillation, persistent    Persistent atrial fibrillation    CKD (chronic kidney disease) stage 4, GFR 15-29 ml/min    Nocturnal hypoxemia    Unsteady gait when walking    Chronic heart failure with preserved ejection fraction (HFpEF)    GI bleed    Peritoneal dialysis status    Hypoalbuminemia    Moderate protein-calorie malnutrition    Acute blood loss anemia     CKD with hospitalization in March 2025 requiring HD due to KILLIAN likely ATN 2/2 rhabdomyolysis, prerenal insult related to diuretics. OM, now on next stage HD 5 days per week (MTWTF) with RIJ TDC  Acute TYLER, hematuria  Urinary retention  Bladder mass s/p resection  A-fib with controlled rates.  History of HFpEF with pulmonary hypertension  Recent osteomyelitis and abscess right foot  T2DM  HTN  A-fib  CAD  PVD   TTE 5/23/25 with EF 56-60%, mild transvalvular regurgitation in prosthetic AV, RVSP >55 mmHg      PLAN:                                                                            Continue TTS schedule while inpatient, then resume next stage 5 day/week HD when discharged.   Latest electrolytes, acid-base stable  Hemoglobin 8.8, no thrombocytopenia.  Continue SHIRLEY.  Transfuse if less than 7  Status post , UF of 4.  Continue UF  for intradialytic weight gains.  Continue Bumex   Urology following for urinary retention, hematuria s/p cysto with clot evacuation and resection of bladder mass 5/24, CBI, await pathology  Podiatry following per right foot wound, healing well, ID input noted.   Status post EGD, GI input noted  Blood pressure: Acceptable, 129/59  Urine cultures no growth, C Diff positive on po vanc  Will monitor and coordinate with team    Note transcribed for ZEINA Paige  [facility]  5/26/2025  09:18 EDT     The note was transcribed by  ZEINA.  I personally have examined the patient and interviewed the patient and modified the history, exam. I have reviewed the history, data, problems, assessment and plan .and I concur . Exam as described in the Progress note, with comments additions, revisions as noted by me.         Les Talavera MD  [facility]  5/26/2025  11:36 EDT

## 2025-05-26 NOTE — PROGRESS NOTES
Infectious Diseases Progress Note    Nicolasa Denny MD     [facility]  Los: 5 days  Patient Identification:  Name: Rock Maciel Jr.  Age: 79 y.o.  Sex: male  :  1945  MRN: [id number]         Primary Care Physician: Denise Dickson, ZEINA        Subjective: Diarrhea is better denies any fever and chills.  Interval History: See consultation note.    Objective:    Scheduled Meds:amiodarone, 200 mg, Oral, Daily  [Held by provider] apixaban, 2.5 mg, Oral, BID  vitamin C, 250 mg, Oral, Daily  atorvastatin, 20 mg, Oral, Nightly  bumetanide, 2 mg, Oral, TID  cetaphil, , Topical, Q12H  clobetasol propionate, 1 Application, Topical, Q12H  [Held by provider] clopidogrel, 75 mg, Oral, Daily  epoetin vince/vince-epbx, 10,000 Units, Subcutaneous, Once per day on   [Held by provider] glipizide, 2.5 mg, Oral, BID AC  hydrALAZINE, 10 mg, Oral, TID  insulin lispro, 2-7 Units, Subcutaneous, 4x Daily AC & at Bedtime  [Held by provider] linagliptin, 5 mg, Oral, Daily  Menthol-Zinc Oxide, 1 Application, Topical, BID  metoprolol succinate XL, 25 mg, Oral, Daily  miconazole, 1 Application, Topical, Q12H  pantoprazole, 40 mg, Intravenous, BID AC  sevelamer, 800 mg, Oral, TID With Meals  sodium chloride, 10 mL, Intravenous, Q12H  sodium chloride, 10 mL, Intravenous, Q12H  sodium chloride, 10 mL, Intravenous, Q12H  sodium hypochlorite, , Topical, BID  tamsulosin, 0.4 mg, Oral, Daily  vancomycin, 125 mg, Oral, Q6H      Continuous Infusions:lactated ringers, 30 mL/hr, Last Rate: 30 mL/hr (25 1002)  Pharmacy Consult,         Vital signs in last 24 hours:  Temp:  [97.3 °F (36.3 °C)-98.6 °F (37 °C)] 98.5 °F (36.9 °C)  Heart Rate:  [69-84] 77  Resp:  [13-16] 16  BP: ()/(39-73) 129/59    Intake/Output:    Intake/Output Summary (Last 24 hours) at 2025 0650  Last data filed at 2025 2329  Gross per 24 hour   Intake 240 ml   Output --   Net 240 ml       Exam:  /59 (BP Location:  "Right arm, Patient Position: Lying)   Pulse 77   Temp 98.5 °F (36.9 °C) (Oral)   Resp 16   Ht 182.9 cm (72\")   Wt 120 kg (264 lb 8.8 oz)   SpO2 98%   BMI 35.88 kg/m²   Patient is examined using the personal protective equipment as per guidelines from infection control for this particular patient as enacted.  Hand washing was performed before and after patient interaction.  General Appearance:  Alert and oriented x 3                          Head:    Normocephalic, without obvious abnormality, atraumatic                           Eyes:    PERRL, conjunctivae/corneas clear, EOM's intact, both eyes                         Throat:   Lips, tongue, gums normal; oral mucosa pink and moist                           Neck:   Supple, symmetrical, trachea midline, no JVD                         Lungs:    Clear to auscultation bilaterally, respirations unlabored                 Chest Wall:    No tenderness or deformity                          Heart:  S1-S2 regular                  Abdomen:   Soft nontender three-way catheter in place                 Extremities: No surrounding inflammation.                              Skin: Skin changes due to decubital process noted.                  Neurologic: Alert and oriented x 3       Data Review:    I reviewed the patient's new clinical results.  Results from last 7 days   Lab Units 05/26/25  0540 05/25/25  0510 05/24/25  0749 05/23/25  0533 05/22/25  1604 05/22/25  0535 05/21/25  2157 05/21/25  1303   WBC 10*3/mm3 9.58 13.85* 7.68 6.04  --  5.56  --  5.94   HEMOGLOBIN g/dL 8.8* 10.1* 8.9* 9.1* 7.6* 6.6* 7.1* 6.6*   PLATELETS 10*3/mm3 219 229 208 222  --  223  --  294     Results from last 7 days   Lab Units 05/26/25  0540 05/25/25  0510 05/24/25  0749 05/23/25  0533 05/22/25  0535 05/21/25  1303   SODIUM mmol/L 137 136 135* 134* 135* 134*   POTASSIUM mmol/L 4.2 4.5 4.3 4.2 3.5 3.9   CHLORIDE mmol/L 103 104 99 98 96* 94*   CO2 mmol/L 24.4 25.9 27.9 28.0 30.0* 32.0*   BUN mg/dL " 32* 24* 30* 21 30* 27*   CREATININE mg/dL 3.64* 2.74* 3.22* 2.63* 3.60* 2.89*   CALCIUM mg/dL 8.4* 8.6 8.4* 8.3* 8.2* 8.5*   GLUCOSE mg/dL 70 69 107* 109* 50* 110*     Microbiology Results (last 10 days)       Procedure Component Value - Date/Time    Clostridioides difficile Toxin - Stool, Per Rectum [[id number]]  (Abnormal) Collected: 05/25/25 0427    Lab Status: Final result Specimen: Stool from Per Rectum Updated: 05/25/25 0722    Narrative:      The following orders were created for panel order Clostridioides difficile Toxin - Stool, Per Rectum.  Procedure                               Abnormality         Status                     ---------                               -----------         ------                     Clostridioides difficile...[[id number]]  Abnormal            Final result                 Please view results for these tests on the individual orders.    Clostridioides difficile Toxin, PCR - Stool, Per Rectum [[id number]]  (Abnormal) Collected: 05/25/25 0427    Lab Status: Final result Specimen: Stool from Per Rectum Updated: 05/25/25 0722     Toxigenic C. difficile by PCR Positive    Narrative:      DNA from a toxigenic strain of C.difficile has been detected. Antigen testing for the presence of free C.difficile toxin is currently in progress, to help determine the clinical significance of this PCR result.     Clostridioides difficile toxin Ag, Reflex - Stool, Per Rectum [[id number]]  (Normal) Collected: 05/25/25 0427    Lab Status: Final result Specimen: Stool from Per Rectum Updated: 05/25/25 0852     C.diff Toxin Ag Negative    Narrative:      DNA from a toxigenic strain of C.difficile was detected, although the free toxin itself was not detected. These findings are consistent with C.difficile colonization and may not reflect actual C.difficile infection. Clinical correlation needed.    Urine Culture - Urine, Urine, Random Void [[id number]]  (Normal) Collected: 05/22/25 1035    Lab Status: Final  result Specimen: Urine, Random Void Updated: 05/23/25 1101     Urine Culture No growth    CANDIDA AURIS PCR - Swab, Axilla Right, Axilla Left and Groin [[id number]]  (Normal) Collected: 05/21/25 1812    Lab Status: Final result Specimen: Swab from Axilla Right, Axilla Left and Groin Updated: 05/23/25 1205     JUDI AURIS PCR Not Detected                Assessment:    Anemia    Essential hypertension    Type 2 diabetes mellitus with circulatory disorder, without long-term current use of insulin    SHASTA (obstructive sleep apnea)    H/O aortic valve replacement with porcine valve    Amputated toe of right foot    S/P CABG x 2    Amputated toe of left foot    Atrial fibrillation, persistent    Persistent atrial fibrillation    CKD (chronic kidney disease) stage 4, GFR 15-29 ml/min    Nocturnal hypoxemia    Unsteady gait when walking    Chronic heart failure with preserved ejection fraction (HFpEF)    GI bleed    Peritoneal dialysis status    Hypoalbuminemia    Moderate protein-calorie malnutrition    Acute blood loss anemia    79-year-old male with  1-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  Patient currently seems to be stable and does not have evidence of active right foot infection or systemic symptoms.  He does have open wound on the right foot that would require care.  2-severe anemia multifactorial management per primary team  3-immobility with evolving decubitus skin changes  4-end-stage renal disease on dialysis per nephrology service via right IJ tunnel catheter  5-immobilization syndrome #6-diabetes  7-peripheral vascular disease  8-C. difficile diarrhea with colonization.         Recommendations/Discussions:  See my discussion and recommendations on 5/23/2025.  Continue with local wound care as clinically there is no evidence of active infection of the foot.  Continue follow-up as per podiatry recommendations.  Supportive care and management of other issues per primary team.  Continue oral vancomycin for 10 days.  Avoid broad-spectrum antibiotic therapy if possible.  Nicolasa Denny MD  5/26/2025  06:50 EDT    Parts of this note may be an electronic transcription/translation of spoken language to printed text using the Dragon dictation system.

## 2025-05-27 LAB
ANION GAP SERPL CALCULATED.3IONS-SCNC: 8 MMOL/L (ref 5–15)
BASOPHILS # BLD AUTO: 0.03 10*3/MM3 (ref 0–0.2)
BASOPHILS NFR BLD AUTO: 0.4 % (ref 0–1.5)
BUN SERPL-MCNC: 39 MG/DL (ref 8–23)
BUN/CREAT SERPL: 8.9 (ref 7–25)
CALCIUM SPEC-SCNC: 8.3 MG/DL (ref 8.6–10.5)
CHLORIDE SERPL-SCNC: 103 MMOL/L (ref 98–107)
CO2 SERPL-SCNC: 24 MMOL/L (ref 22–29)
CREAT SERPL-MCNC: 4.39 MG/DL (ref 0.76–1.27)
DEPRECATED RDW RBC AUTO: 54.8 FL (ref 37–54)
EGFRCR SERPLBLD CKD-EPI 2021: 12.9 ML/MIN/1.73
EOSINOPHIL # BLD AUTO: 0.54 10*3/MM3 (ref 0–0.4)
EOSINOPHIL NFR BLD AUTO: 6.7 % (ref 0.3–6.2)
ERYTHROCYTE [DISTWIDTH] IN BLOOD BY AUTOMATED COUNT: 15.2 % (ref 12.3–15.4)
GLUCOSE BLDC GLUCOMTR-MCNC: 135 MG/DL (ref 70–130)
GLUCOSE BLDC GLUCOMTR-MCNC: 177 MG/DL (ref 70–130)
GLUCOSE BLDC GLUCOMTR-MCNC: 184 MG/DL (ref 70–130)
GLUCOSE BLDC GLUCOMTR-MCNC: 88 MG/DL (ref 70–130)
GLUCOSE SERPL-MCNC: 78 MG/DL (ref 65–99)
HCT VFR BLD AUTO: 26.7 % (ref 37.5–51)
HGB BLD-MCNC: 8.2 G/DL (ref 13–17.7)
IMM GRANULOCYTES # BLD AUTO: 0.05 10*3/MM3 (ref 0–0.05)
IMM GRANULOCYTES NFR BLD AUTO: 0.6 % (ref 0–0.5)
LYMPHOCYTES # BLD AUTO: 0.72 10*3/MM3 (ref 0.7–3.1)
LYMPHOCYTES NFR BLD AUTO: 9 % (ref 19.6–45.3)
MCH RBC QN AUTO: 30.1 PG (ref 26.6–33)
MCHC RBC AUTO-ENTMCNC: 30.7 G/DL (ref 31.5–35.7)
MCV RBC AUTO: 98.2 FL (ref 79–97)
MONOCYTES # BLD AUTO: 0.48 10*3/MM3 (ref 0.1–0.9)
MONOCYTES NFR BLD AUTO: 6 % (ref 5–12)
NEUTROPHILS NFR BLD AUTO: 6.2 10*3/MM3 (ref 1.7–7)
NEUTROPHILS NFR BLD AUTO: 77.3 % (ref 42.7–76)
NRBC BLD AUTO-RTO: 0 /100 WBC (ref 0–0.2)
PHOSPHATE SERPL-MCNC: 4.6 MG/DL (ref 2.5–4.5)
PLATELET # BLD AUTO: 222 10*3/MM3 (ref 140–450)
PMV BLD AUTO: 9.3 FL (ref 6–12)
POTASSIUM SERPL-SCNC: 4.6 MMOL/L (ref 3.5–5.2)
RBC # BLD AUTO: 2.72 10*6/MM3 (ref 4.14–5.8)
SODIUM SERPL-SCNC: 135 MMOL/L (ref 136–145)
WBC NRBC COR # BLD AUTO: 8.02 10*3/MM3 (ref 3.4–10.8)

## 2025-05-27 PROCEDURE — 97110 THERAPEUTIC EXERCISES: CPT

## 2025-05-27 PROCEDURE — 85025 COMPLETE CBC W/AUTO DIFF WBC: CPT | Performed by: UROLOGY

## 2025-05-27 PROCEDURE — 97530 THERAPEUTIC ACTIVITIES: CPT

## 2025-05-27 PROCEDURE — 82948 REAGENT STRIP/BLOOD GLUCOSE: CPT

## 2025-05-27 PROCEDURE — 84100 ASSAY OF PHOSPHORUS: CPT | Performed by: UROLOGY

## 2025-05-27 PROCEDURE — 80048 BASIC METABOLIC PNL TOTAL CA: CPT | Performed by: UROLOGY

## 2025-05-27 PROCEDURE — 25010000002 HEPARIN (PORCINE) PER 1000 UNITS: Performed by: STUDENT IN AN ORGANIZED HEALTH CARE EDUCATION/TRAINING PROGRAM

## 2025-05-27 PROCEDURE — 63710000001 INSULIN LISPRO (HUMAN) PER 5 UNITS: Performed by: UROLOGY

## 2025-05-27 RX ORDER — PANTOPRAZOLE SODIUM 40 MG/1
40 TABLET, DELAYED RELEASE ORAL
Status: DISCONTINUED | OUTPATIENT
Start: 2025-05-27 | End: 2025-05-29

## 2025-05-27 RX ORDER — HEPARIN SODIUM 1000 [USP'U]/ML
4000 INJECTION, SOLUTION INTRAVENOUS; SUBCUTANEOUS AS NEEDED
Status: DISCONTINUED | OUTPATIENT
Start: 2025-05-27 | End: 2025-06-04 | Stop reason: HOSPADM

## 2025-05-27 RX ADMIN — ATORVASTATIN CALCIUM 20 MG: 20 TABLET, FILM COATED ORAL at 20:39

## 2025-05-27 RX ADMIN — AMIODARONE HYDROCHLORIDE 200 MG: 200 TABLET ORAL at 08:18

## 2025-05-27 RX ADMIN — DAKIN'S SOLUTION 0.125% (QUARTER STRENGTH): 0.12 SOLUTION at 08:36

## 2025-05-27 RX ADMIN — Medication 10 ML: at 08:39

## 2025-05-27 RX ADMIN — CAMPHOR, MENTHOL: .5; .5 LOTION TOPICAL at 20:40

## 2025-05-27 RX ADMIN — SALINE NASAL SPRAY 1 SPRAY: 1.5 SOLUTION NASAL at 08:37

## 2025-05-27 RX ADMIN — SEVELAMER CARBONATE 800 MG: 800 TABLET, FILM COATED ORAL at 17:31

## 2025-05-27 RX ADMIN — VANCOMYCIN HYDROCHLORIDE 125 MG: 125 CAPSULE ORAL at 23:38

## 2025-05-27 RX ADMIN — PANTOPRAZOLE SODIUM 40 MG: 40 INJECTION, POWDER, FOR SOLUTION INTRAVENOUS at 06:34

## 2025-05-27 RX ADMIN — CLOBETASOL PROPIONATE CREAM USP, 0.05% 1 APPLICATION: 0.5 CREAM TOPICAL at 08:35

## 2025-05-27 RX ADMIN — INSULIN LISPRO 2 UNITS: 100 INJECTION, SOLUTION INTRAVENOUS; SUBCUTANEOUS at 21:47

## 2025-05-27 RX ADMIN — VANCOMYCIN HYDROCHLORIDE 125 MG: 125 CAPSULE ORAL at 00:20

## 2025-05-27 RX ADMIN — Medication 10 ML: at 20:40

## 2025-05-27 RX ADMIN — SEVELAMER CARBONATE 800 MG: 800 TABLET, FILM COATED ORAL at 08:30

## 2025-05-27 RX ADMIN — MENTHOL, ZINC OXIDE 1 APPLICATION: .44; 20.6 OINTMENT TOPICAL at 08:37

## 2025-05-27 RX ADMIN — CLOBETASOL PROPIONATE CREAM USP, 0.05% 1 APPLICATION: 0.5 CREAM TOPICAL at 08:36

## 2025-05-27 RX ADMIN — DAKIN'S SOLUTION 0.125% (QUARTER STRENGTH): 0.12 SOLUTION at 23:20

## 2025-05-27 RX ADMIN — CLOBETASOL PROPIONATE CREAM USP, 0.05% 1 APPLICATION: 0.5 CREAM TOPICAL at 20:40

## 2025-05-27 RX ADMIN — Medication: at 20:41

## 2025-05-27 RX ADMIN — OXYCODONE AND ACETAMINOPHEN 1 TABLET: 5; 325 TABLET ORAL at 08:38

## 2025-05-27 RX ADMIN — INSULIN LISPRO 2 UNITS: 100 INJECTION, SOLUTION INTRAVENOUS; SUBCUTANEOUS at 12:27

## 2025-05-27 RX ADMIN — VANCOMYCIN HYDROCHLORIDE 125 MG: 125 CAPSULE ORAL at 06:34

## 2025-05-27 RX ADMIN — ANTI-FUNGAL POWDER MICONAZOLE NITRATE TALC FREE 1 APPLICATION: 1.42 POWDER TOPICAL at 20:41

## 2025-05-27 RX ADMIN — Medication: at 08:36

## 2025-05-27 RX ADMIN — VANCOMYCIN HYDROCHLORIDE 125 MG: 125 CAPSULE ORAL at 17:31

## 2025-05-27 RX ADMIN — HEPARIN SODIUM 4000 UNITS: 1000 INJECTION INTRAVENOUS; SUBCUTANEOUS at 17:03

## 2025-05-27 RX ADMIN — ANTI-FUNGAL POWDER MICONAZOLE NITRATE TALC FREE 1 APPLICATION: 1.42 POWDER TOPICAL at 08:40

## 2025-05-27 RX ADMIN — MENTHOL, ZINC OXIDE 1 APPLICATION: .44; 20.6 OINTMENT TOPICAL at 20:40

## 2025-05-27 RX ADMIN — OXYCODONE HYDROCHLORIDE AND ACETAMINOPHEN 250 MG: 500 TABLET ORAL at 08:24

## 2025-05-27 RX ADMIN — PANTOPRAZOLE SODIUM 40 MG: 40 TABLET, DELAYED RELEASE ORAL at 17:31

## 2025-05-27 RX ADMIN — TAMSULOSIN HYDROCHLORIDE 0.4 MG: 0.4 CAPSULE ORAL at 08:23

## 2025-05-27 RX ADMIN — Medication 10 ML: at 08:40

## 2025-05-27 RX ADMIN — VANCOMYCIN HYDROCHLORIDE 125 MG: 125 CAPSULE ORAL at 12:27

## 2025-05-27 RX ADMIN — SEVELAMER CARBONATE 800 MG: 800 TABLET, FILM COATED ORAL at 12:27

## 2025-05-27 RX ADMIN — CAMPHOR, MENTHOL: .5; .5 LOTION TOPICAL at 08:35

## 2025-05-27 NOTE — PROGRESS NOTES
Infectious Diseases Progress Note    Nicolsaa Denny MD     [facility]  [place]: 6 days  Patient Identification:  Name: Rock Maciel Jr.  Age: 79 y.o.  Sex: male  :  1945  MRN: [id number]         Primary Care Physician: Denise Dickson, ZEINA        Subjective: Denies any new complaints.  Tolerating oral vancomycin without any problem.  Interval History: See consultation note.    Objective:    Scheduled Meds:amiodarone, 200 mg, Oral, Daily  [Held by provider] apixaban, 2.5 mg, Oral, BID  vitamin C, 250 mg, Oral, Daily  atorvastatin, 20 mg, Oral, Nightly  bumetanide, 2 mg, Oral, TID  cetaphil, , Topical, Q12H  clobetasol propionate, 1 Application, Topical, Q12H  [Held by provider] clopidogrel, 75 mg, Oral, Daily  epoetin vince/vince-epbx, 10,000 Units, Subcutaneous, Once per day on   [Held by provider] glipizide, 2.5 mg, Oral, BID AC  hydrALAZINE, 10 mg, Oral, TID  insulin lispro, 2-7 Units, Subcutaneous, 4x Daily AC & at Bedtime  [Held by provider] linagliptin, 5 mg, Oral, Daily  Menthol-Zinc Oxide, 1 Application, Topical, BID  metoprolol succinate XL, 25 mg, Oral, Daily  miconazole, 1 Application, Topical, Q12H  pantoprazole, 40 mg, Intravenous, BID AC  sevelamer, 800 mg, Oral, TID With Meals  sodium chloride, 10 mL, Intravenous, Q12H  sodium chloride, 10 mL, Intravenous, Q12H  sodium chloride, 10 mL, Intravenous, Q12H  sodium hypochlorite, , Topical, BID  tamsulosin, 0.4 mg, Oral, Daily  vancomycin, 125 mg, Oral, Q6H      Continuous Infusions:Pharmacy Consult,         Vital signs in last 24 hours:  Temp:  [98.3 °F (36.8 °C)-98.4 °F (36.9 °C)] 98.3 °F (36.8 °C)  Heart Rate:  [63-77] 66  Resp:  [16] 16  BP: (110-136)/(46-58) 132/49    Intake/Output:    Intake/Output Summary (Last 24 hours) at 2025 0813  Last data filed at 2025  Gross per 24 hour   Intake 480 ml   Output --   Net 480 ml       Exam:  /49   Pulse 66   Temp 98.3 °F (36.8 °C) (Oral)    "Resp 16   Ht 182.9 cm (72\")   Wt 121 kg (265 lb 10.5 oz) Comment: 3 pillows 1 blanket 1 sheet  SpO2 95%   BMI 36.03 kg/m²   Patient is examined using the personal protective equipment as per guidelines from infection control for this particular patient as enacted.  Hand washing was performed before and after patient interaction.  General Appearance:  Alert and oriented x 3                          Head:    Normocephalic, without obvious abnormality, atraumatic                           Eyes:    PERRL, conjunctivae/corneas clear, EOM's intact, both eyes                         Throat:   Lips, tongue, gums normal; oral mucosa pink and moist                           Neck:   Supple, symmetrical, trachea midline, no JVD                         Lungs:    Clear to auscultation bilaterally, respirations unlabored                 Chest Wall:    No tenderness or deformity                          Heart:  S1-S2 regular                  Abdomen:   Soft nontender three-way catheter in place                 Extremities: No surrounding inflammation.                              Skin: Skin changes due to decubital process noted.                  Neurologic: Alert and oriented x 3       Data Review:    I reviewed the patient's new clinical results.  Results from last 7 days   Lab Units 05/27/25  0656 05/26/25  0540 05/25/25  0510 05/24/25  0749 05/23/25  0533 05/22/25  1604 05/22/25  0535 05/21/25  2157 05/21/25  1303   WBC 10*3/mm3 8.02 9.58 13.85* 7.68 6.04  --  5.56  --  5.94   HEMOGLOBIN g/dL 8.2* 8.8* 10.1* 8.9* 9.1* 7.6* 6.6*   < > 6.6*   PLATELETS 10*3/mm3 222 219 229 208 222  --  223  --  294    < > = values in this interval not displayed.     Results from last 7 days   Lab Units 05/27/25  0656 05/26/25  0540 05/25/25  0510 05/24/25  0749 05/23/25  0533 05/22/25  0535 05/21/25  1303   SODIUM mmol/L 135* 137 136 135* 134* 135* 134*   POTASSIUM mmol/L 4.6 4.2 4.5 4.3 4.2 3.5 3.9   CHLORIDE mmol/L 103 103 104 99 98 96* 94* "   CO2 mmol/L 24.0 24.4 25.9 27.9 28.0 30.0* 32.0*   BUN mg/dL 39* 32* 24* 30* 21 30* 27*   CREATININE mg/dL 4.39* 3.64* 2.74* 3.22* 2.63* 3.60* 2.89*   CALCIUM mg/dL 8.3* 8.4* 8.6 8.4* 8.3* 8.2* 8.5*   GLUCOSE mg/dL 78 70 69 107* 109* 50* 110*     Microbiology Results (last 10 days)       Procedure Component Value - Date/Time    Clostridioides difficile Toxin - Stool, Per Rectum [[id number]]  (Abnormal) Collected: 05/25/25 0427    Lab Status: Final result Specimen: Stool from Per Rectum Updated: 05/25/25 0722    Narrative:      The following orders were created for panel order Clostridioides difficile Toxin - Stool, Per Rectum.  Procedure                               Abnormality         Status                     ---------                               -----------         ------                     Clostridioides difficile...[[id number]]  Abnormal            Final result                 Please view results for these tests on the individual orders.    Clostridioides difficile Toxin, PCR - Stool, Per Rectum [[id number]]  (Abnormal) Collected: 05/25/25 0427    Lab Status: Final result Specimen: Stool from Per Rectum Updated: 05/25/25 0722     Toxigenic C. difficile by PCR Positive    Narrative:      DNA from a toxigenic strain of C.difficile has been detected. Antigen testing for the presence of free C.difficile toxin is currently in progress, to help determine the clinical significance of this PCR result.     Clostridioides difficile toxin Ag, Reflex - Stool, Per Rectum [[id number]]  (Normal) Collected: 05/25/25 0427    Lab Status: Final result Specimen: Stool from Per Rectum Updated: 05/25/25 0852     C.diff Toxin Ag Negative    Narrative:      DNA from a toxigenic strain of C.difficile was detected, although the free toxin itself was not detected. These findings are consistent with C.difficile colonization and may not reflect actual C.difficile infection. Clinical correlation needed.    Urine Culture - Urine, Urine,  Random Void [[id number]]  (Normal) Collected: 05/22/25 1035    Lab Status: Final result Specimen: Urine, Random Void Updated: 05/23/25 1101     Urine Culture No growth    CANDIDA AURIS PCR - Swab, Axilla Right, Axilla Left and Groin [[id number]]  (Normal) Collected: 05/21/25 1812    Lab Status: Final result Specimen: Swab from Axilla Right, Axilla Left and Groin Updated: 05/23/25 1205     JUDI AURIS PCR Not Detected                Assessment:    Anemia    Essential hypertension    Type 2 diabetes mellitus with circulatory disorder, without long-term current use of insulin    SHASTA (obstructive sleep apnea)    H/O aortic valve replacement with porcine valve    Amputated toe of right foot    S/P CABG x 2    Amputated toe of left foot    Atrial fibrillation, persistent    Persistent atrial fibrillation    CKD (chronic kidney disease) stage 4, GFR 15-29 ml/min    Nocturnal hypoxemia    Unsteady gait when walking    Chronic heart failure with preserved ejection fraction (HFpEF)    GI bleed    Peritoneal dialysis status    Hypoalbuminemia    Moderate protein-calorie malnutrition    Acute blood loss anemia    79-year-old male with  1-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  Patient currently seems to be stable and does not have evidence of active right foot infection or systemic symptoms.  He does have open wound on the right foot that would require care.  2-severe anemia multifactorial management per primary team  3-immobility with evolving decubitus skin changes  4-end-stage renal disease on dialysis per nephrology service via right IJ tunnel catheter  5-immobilization syndrome #6-diabetes  7-peripheral vascular  disease  8-C. difficile diarrhea with colonization.        Recommendations/Discussions:  See my discussion and recommendations on 5/23/2025.  Continue with local wound care as clinically there is no evidence of active infection of the foot.  Continue follow-up as per podiatry recommendations.  Supportive care and management of other issues per primary team.  Continue oral vancomycin for 10 days.  Avoid broad-spectrum antibiotic therapy if possible.  Nicolasa Denny MD  5/27/2025  08:13 EDT    Parts of this note may be an electronic transcription/translation of spoken language to printed text using the Dragon dictation system.

## 2025-05-27 NOTE — PROGRESS NOTES
"      FOLLOW UP NOTE      Name: Rock Maciel Jr. ADMIT: 2025   : 1945  PCP: Denise Dickson APRN    MRN: [id number] LOS: 6 days   AGE/SEX: 79 y.o. male  ROOM: [facility]     Date of Service: 2025                           CHIEF COMPLIANTS / REASON FOR FOLLOW UP          KILLIAN/CKD NXT stage 5 days/week    Subjective:      Resting in bed  No complaints  Johnson discontinued this morning.  Yet to void.         Review of Systems:       Negative except as above       OBJECTIVE                                                                        Exam:  /52   Pulse 66   Temp 98.3 °F (36.8 °C) (Oral)   Resp 16   Ht 182.9 cm (72\")   Wt 121 kg (265 lb 10.5 oz) Comment: 3 pillows 1 blanket 1 sheet  SpO2 95%   BMI 36.03 kg/m²   Intake/Output last 3 shifts:  I/O last 3 completed shifts:  In: 720 [P.O.:720]  Out: -   Intake/Output this shift:  I/O this shift:  In: 240 [P.O.:240]  Out: -     GEN: Pleasant chronically ill elderly gentleman in no acute distress  RESP: clear to auscultation bilaterally, no crackles/wheezing  CVS: RRR, S1, S2+, no murmurs/rubs/gallops  ABD: soft, nontender, nondistended  NEURO: AAOX3  EXT: Trace edema lower extremity, some dependent edema  ACCESS: [facility]  : Alex discontinued      Scheduled Meds:amiodarone, 200 mg, Oral, Daily  [Held by provider] apixaban, 2.5 mg, Oral, BID  vitamin C, 250 mg, Oral, Daily  atorvastatin, 20 mg, Oral, Nightly  bumetanide, 2 mg, Oral, TID  cetaphil, , Topical, Q12H  clobetasol propionate, 1 Application, Topical, Q12H  [Held by provider] clopidogrel, 75 mg, Oral, Daily  epoetin vince/vince-epbx, 10,000 Units, Subcutaneous, Once per day on   [Held by provider] glipizide, 2.5 mg, Oral, BID AC  hydrALAZINE, 10 mg, Oral, TID  insulin lispro, 2-7 Units, Subcutaneous, 4x Daily AC & at Bedtime  [Held by provider] linagliptin, 5 mg, Oral, Daily  Menthol-Zinc Oxide, 1 Application, Topical, BID  metoprolol succinate XL, 25 mg, " Oral, Daily  miconazole, 1 Application, Topical, Q12H  pantoprazole, 40 mg, Intravenous, BID AC  sevelamer, 800 mg, Oral, TID With Meals  sodium chloride, 10 mL, Intravenous, Q12H  sodium chloride, 10 mL, Intravenous, Q12H  sodium chloride, 10 mL, Intravenous, Q12H  sodium hypochlorite, , Topical, BID  tamsulosin, 0.4 mg, Oral, Daily  vancomycin, 125 mg, Oral, Q6H      Continuous Infusions:Pharmacy Consult,       PRN Meds:  acetaminophen **OR** acetaminophen **OR** acetaminophen    calcium carbonate    camphor-menthol    dextrose    dextrose    glucagon (human recombinant)    heparin    hydrOXYzine    lidocaine    melatonin    nitroglycerin    ondansetron ODT **OR** ondansetron    oxybutynin    oxyCODONE-acetaminophen    Pharmacy Consult    sodium chloride    sodium chloride    sodium chloride    sodium chloride    sodium chloride    sodium chloride    sodium chloride         Data Review:                                                                           Labs reviewed        Imaging:                                                                           Radiology reviewed           ASSESSMENT:                                                                                Anemia    Essential hypertension    Type 2 diabetes mellitus with circulatory disorder, without long-term current use of insulin    SHASTA (obstructive sleep apnea)    H/O aortic valve replacement with porcine valve    Amputated toe of right foot    S/P CABG x 2    Amputated toe of left foot    Atrial fibrillation, persistent    Persistent atrial fibrillation    CKD (chronic kidney disease) stage 4, GFR 15-29 ml/min    Nocturnal hypoxemia    Unsteady gait when walking    Chronic heart failure with preserved ejection fraction (HFpEF)    GI bleed    Peritoneal dialysis status    Hypoalbuminemia    Moderate protein-calorie malnutrition    Acute blood loss anemia     CKD with hospitalization in March 2025 requiring HD due to KILLIAN likely ATN 2/2  rhabdomyolysis, prerenal insult related to diuretics. OM, now on next stage HD 5 days per week (MTWThF) with RIJ TDC  Acute TYLER, hematuria  Urinary retention  Bladder mass s/p resection  A-fib with controlled rates.  History of HFpEF with pulmonary hypertension  Recent osteomyelitis and abscess right foot  T2DM  HTN  A-fib  CAD  PVD   TTE 5/23/25 with EF 56-60%, mild transvalvular regurgitation in prosthetic AV, RVSP >55 mmHg      PLAN:                                                                            Plan for HD today and will continue TTS schedule.  Access RIJ TDC.  Labs largely stable.  Urology managing gross hematuria.  Off CBI.  Johnson catheter discontinued today.  Awaiting biopsy.  Continue with diuretics.  Continue antihypertensives.  Continue phosphate binder.  Continue EPO with HD.  Status post EGD, GI input noted  On p.o. vancomycin for C. difficile.    D/W RN.     Tiesha Mccrary MD  [facility]  5/27/2025  11:42 EDT

## 2025-05-27 NOTE — PLAN OF CARE
Goal Outcome Evaluation:            Pt resting in bed quietly. Denies pain. Turned. Urine is dark yellow in color but no blood or clots seen.

## 2025-05-27 NOTE — TREATMENT PLAN
"Spoke with patient at bedside in follow up from goals of care conversation with Dr. Rose on Friday. Patient states he had an eventful long weekend with many tests and results pending. However he feels good overall and feels things are \"going in the right direction\". Was able to work with PT today. Appetite poor but he is drinking nutritional supplements. Pain is under control, does not want any intervention for this. Itching better after the addition of Sarna lotion. Shortness of breath continue but he states it is better.     Patient is receptive to outpatient palliative care follow up at his facility. Will try to coordinate this with [facility].    No additional inpatient palliative care team needs at this time, will sign off. Please re consult if needs or goals change.   "

## 2025-05-27 NOTE — THERAPY TREATMENT NOTE
Patient Name: Rock Maciel Jr.  : 1945    MRN: [id number]                              Today's Date: 2025       Admit Date: 2025    Visit Dx:     ICD-10-CM ICD-9-CM   1. Acute blood loss anemia  D62 285.1   2. Rectal bleeding  K62.5 569.3   3. ESRD on hemodialysis  N18.6 585.6    Z99.2 V45.11   4. Gastrointestinal hemorrhage, unspecified gastrointestinal hemorrhage type  K92.2 578.9     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    Chronic venous insufficiency    Nonrheumatic aortic valve stenosis    Carotid stenosis, asymptomatic    Bradycardia    Hyperlipidemia    Bilateral carotid artery disease    Abnormal cardiovascular stress test    H/O aortic valve replacement with porcine valve    Charcot-Davida-Tooth disease-like deformity of foot    Amputated toe of right foot    Fall    Non-traumatic rhabdomyolysis    S/P CABG x 2    CKD (chronic kidney disease) stage 3, GFR 30-59 ml/min    Rupture of left quadriceps tendon    Constipation    KILLIAN (acute kidney injury)    Wound of right leg    Anemia    Chronic diastolic (congestive) heart failure    Amputated toe of left foot    Gas gangrene    Cellulitis of left lower limb    Acquired absence of left foot    Bilateral lower extremity edema    Stage 3b chronic kidney disease    History of pneumonia    Atelectasis of both lungs    Abnormal pleural fluid    Pleural thickening    Atrial fibrillation, persistent    Chronic anticoagulation    Persistent atrial fibrillation    Bladder wall thickening    Hematuria    CKD (chronic kidney disease) stage 4, GFR 15-29  ml/min    Hypoxemia    Nocturnal hypoxemia    Status post left inguinal hernia repair, follow-up exam    Weakness of both lower extremities    Unsteady gait when walking    Wound, open, arm, forearm, right, subsequent encounter    Traumatic rhabdomyolysis    Closed displaced fracture of left clavicle    Pneumonia due to infectious organism    ATN (acute tubular necrosis)    Contusion of left knee    Acute osteomyelitis of right foot    Diabetic foot infection    MRSA (methicillin resistant Staphylococcus aureus) infection    Acute kidney injury    Chronic heart failure with preserved ejection fraction (HFpEF)    GI bleed    Peritoneal dialysis status    Hypoalbuminemia    Moderate protein-calorie malnutrition    Acute blood loss anemia     Past Medical History:   Diagnosis Date    Acquired absence of left foot 02/19/2022    Acute osteomyelitis of right foot 03/07/2025    Allergic rhinitis     Amputated toe of left foot 02/12/2021    Amputated toe of right foot 04/11/2019    ATN (acute tubular necrosis) 03/05/2025    Atrial fibrillation, persistent 10/18/2024    Bladder wall thickening 12/01/2024    Bronchitis     Carotid stenosis, asymptomatic 01/30/2017    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  Added automatically from request for surgery [id number]      Gout     H/O aortic valve replacement with porcine valve 09/10/2018    Hiatal hernia     History of MRSA infection     RIGHT FOOT 2010    Hyperlipidemia     Hypertension     LAFB (left  anterior fascicular block) 05/17/2016    MRSA (methicillin resistant Staphylococcus aureus) infection 03/08/2025    Murmur     Neuropathic arthropathy 05/17/2016    Overview:   2015 IMO UPDATE  Overview:   2015 IMO UPDATE      Nocturnal hypoxemia 12/01/2024    Nonrheumatic aortic valve stenosis 01/30/2017    Persistent atrial fibrillation 11/07/2024    Pneumothorax on right     S/P CABG x 2 05/06/2019 July 2018      Sleep apnea     pt wears CPAP at night    Status post left inguinal hernia repair, follow-up exam 01/05/2025    Traumatic rhabdomyolysis 03/03/2025    Type 2 diabetes mellitus with circulatory disorder, without long-term current use of insulin 05/17/2016    Unsteady gait when walking 01/05/2025    Wellness examination 06/24/2015    Annual Wellness Visit     Past Surgical History:   Procedure Laterality Date    ARTERY SURGERY Bilateral     carotid    BONE EXCISION LEG Right 3/10/2025    Procedure: BONE EXCISION LOWER EXTERMITY, RIGHT;  Surgeon: Jimenez Mchugh DPM;  Location: [facility] MAIN OR;  Service: Podiatry;  Laterality: Right;    CARDIAC CATHETERIZATION N/A 7/20/2018    Procedure: Left Heart Cath;  Surgeon: Jo Toney MD;  Location:  [facility];  Service: Cardiovascular    CARDIAC CATHETERIZATION N/A 7/20/2018    Procedure: Coronary angiography;  Surgeon: Jo Toney MD;  Location:  [facility];  Service: Cardiovascular    CAROTID ENDARTERECTOMY Bilateral     COLONOSCOPY  2008    CORONARY ARTERY BYPASS GRAFT WITH AORTIC VALVE REPAIR/REPLACEMENT N/A 7/23/2018    Procedure: INTRAOPERATIVE ALEX, MIDLINE STERNOTOMY, CORONARY ARTERY BYPASS GRAFTING X 3 USING ENDOSCOPICALLY HARVESTED LEFT GREATER SAPHENOUS VEIN,  AORTIC VALVE REPLACEMENT USING 25MM LOPEZ II ULTRA PORCINE VALVE, PRP;  Surgeon: Phong Posey MD;  Location: [facility] MAIN OR;  Service: Cardiothoracic    ENDOSCOPY N/A 5/25/2025    Procedure: ESOPHAGOGASTRODUODENOSCOPY with biopsy;   Surgeon: Darrian Webb MD;  Location: [facility] ENDOSCOPY;  Service: Gastroenterology;  Laterality: N/A;  congested gastropathy    FOOT SURGERY Right 2010    5th digit removal    FOOT SURGERY Left 2011    1 digit removed    INCISION AND DRAINAGE LEG Right 3/28/2020    Procedure: DEBRIDMENT OF RIGHT CALF;  Surgeon: Ross Pineda MD;  Location: [facility] MAIN OR;  Service: Vascular;  Laterality: Right;    INCISION AND DRAINAGE LEG Right 3/10/2025    Procedure: INCISION AND DRAINAGE LOWER EXTREMITY;  Surgeon: Jimenez Mchugh DPM;  Location: [facility] MAIN OR;  Service: Podiatry;  Laterality: Right;    INGUINAL HERNIA REPAIR Left 11/29/2024    Procedure: INGUINAL HERNIA REPAIR LAPAROSCOPIC WITH DAVINCI ROBOT;  Surgeon: Phong Lazo MD;  Location: [facility] MAIN OR;  Service: Robotics - DaVinci;  Laterality: Left;    INSERTION HEMODIALYSIS CATHETER N/A 3/11/2025    Procedure: HEMODIALYSIS CATHETER INSERTION;  Surgeon: Jeaneth Bonds MD;  Location: [facility] MAIN OR;  Service: Vascular;  Laterality: N/A;    QUADRICEPS TENDON REPAIR Left 5/14/2019    Procedure: Left QUADRICEPS TENDON REPAIR;  Surgeon: Camilo Hunter MD;  Location: [facility] MAIN OR;  Service: Orthopedics    THORACENTESIS Right 11/21/2016    THORACOSCOPY Right 5/8/2017    Procedure: BRONCHOSCOPY, RIGHT VAT,  TOTAL DECORTICATION RIGHT LUNG, PLEURAL BX, PLACEMENT SUBPLEURAL PAIN CAATHETERS X2;  Surgeon: Donald Orlando III, MD;  Location: [facility] OR;  Service:     TONSILECTOMY, ADENOIDECTOMY, BILATERAL MYRINGOTOMY AND TUBES      WOUND DEBRIDEMENT Right 3/13/2025    Procedure: DEBRIDEMENT FOOT, RIGHT;  Surgeon: Jimenez Mchugh DPM;  Location: [facility] MAIN OR;  Service: Podiatry;  Laterality: Right;      General Information       Row Name 05/27/25 1240          Physical Therapy Time and Intention    Document Type therapy note (daily note)  -ST     Mode of Treatment individual therapy;physical therapy  -ST       Row Name 05/27/25 1240           General Information    Patient Profile Reviewed yes  -ST     Existing Precautions/Restrictions fall  NWB R LE and L  UE; L KI for WB  -ST     Barriers to Rehab medically complex  -ST       Row Name 05/27/25 1245          Cognition    Orientation Status (Cognition) oriented x 3  -ST       Row Name 05/27/25 1245          Safety Issues/Impairments Affecting Functional Mobility    Impairments Affecting Function (Mobility) balance;endurance/activity tolerance;pain;postural/trunk control;range of motion (ROM);strength  -ST     Comment, Safety Issues/Impairments (Mobility) nonskid socks donned  -ST               User Key  (r) = Recorded By, (t) = Taken By, (c) = Cosigned By      Initials Name Provider Type    Gertrudis Pepper PT Physical Therapist                   Mobility       Row Name 05/27/25 1247          Bed Mobility    Bed Mobility supine-sit;sit-supine  -ST     Supine-Sit [place] (Bed Mobility) moderate assist (50% patient effort);2 person assist  -ST     Sit-Supine [place] (Bed Mobility) moderate assist (50% patient effort);2 person assist  -ST     Assistive Device (Bed Mobility) repositioning sheet;head of bed elevated  -ST     Comment, (Bed Mobility) cues for sequencing. good initiation.  -ST       Row Name 05/27/25 1247          Transfers    Comment, (Transfers) unable to progress to transfers, unable to find KI  -ST       Row Name 05/27/25 1247          Mobility    Left Upper Extremity (Weight-bearing Status) non weight-bearing (NWB)  -ST     Right Lower Extremity (Weight-bearing Status) non weight-bearing (NWB)  -ST               User Key  (r) = Recorded By, (t) = Taken By, (c) = Cosigned By      Initials Name Provider Type    Gertrudis Pepper PT Physical Therapist                   Obj/Interventions       Row Name 05/27/25 1248          Motor Skills    Therapeutic Exercise --  bilat LAQ, hip flexion 4 x 5 bilat  -ST       Row Name 05/27/25 1248          Balance    Comment,  Balance SBA for unsupported sitting, CGA/min as pt starts to lean posteriorly with LE exercises or with fatigue. Cues for posture throughou  -ST               User Key  (r) = Recorded By, (t) = Taken By, (c) = Cosigned By      Initials Name Provider Type    Gertrudis Pepper, REGAN Physical Therapist                   Goals/Plan    No documentation.                  Clinical Impression       Row Name 05/27/25 1249          Pain    Pretreatment Pain Rating 0/10 - no pain  -ST     Posttreatment Pain Rating 0/10 - no pain  -ST       Row Name 05/27/25 1249          Plan of Care Review    Plan of Care Reviewed With patient  -ST     Progress improving  -ST     Outcome Evaluation Pt seen for PT this AM - mod A of 2 for bed mobility and tolerates increased time sitting EOB for LE exercises. noted to fatigue wtih activity and increased posterior lean with dynamic movements. Cues for posture throughout. Unable to progress to tranfers as no KI present in room for L MICHELLE - RN updated. Recommend SNU at d/c.  -ST       Row Name 05/27/25 1249          Therapy Assessment/Plan (PT)    Rehab Potential (PT) fair  -ST     Criteria for Skilled Interventions Met (PT) yes  -ST     Therapy Frequency (PT) 5 times/wk  -ST       Row Name 05/27/25 1249          Positioning and Restraints    Pre-Treatment Position in bed  -ST     Post Treatment Position bed  -ST     In Bed notified nsg;supine;call light within reach;encouraged to call for assist;exit alarm on  -ST               User Key  (r) = Recorded By, (t) = Taken By, (c) = Cosigned By      Initials Name Provider Type    Gertrudis Pepper, REGAN Physical Therapist                   Outcome Measures       Row Name 05/27/25 1250 05/27/25 1200       How much help from another person do you currently need...    Turning from your back to your side while in flat bed without using bedrails? 2  -ST 1  -PB (r) KH (t) PB (c)    Moving from lying on back to sitting on the side of a flat bed without  bedrails? 2  -ST 1  -PB (r) KH (t) PB (c)    Moving to and from a bed to a chair (including a wheelchair)? 1  -ST 1  -PB (r) KH (t) PB (c)    Standing up from a chair using your arms (e.g., wheelchair, bedside chair)? 1  -ST 1  -PB (r) KH (t) PB (c)    Climbing 3-5 steps with a railing? 1  -ST 1  -PB (r) KH (t) PB (c)    To walk in hospital room? 1  -ST 1  -PB (r) KH (t) PB (c)    AM-PAC 6 Clicks Score (PT) 8  -ST 6  -PB (r) KH (t)    Highest Level of Mobility Goal Sit at Edge of Bed-3  -ST Bed Activities/Dependent Transfer-2  -PB (r) KH (t)      Row Name 05/27/25 1000 05/27/25 0800       How much help from another person do you currently need...    Turning from your back to your side while in flat bed without using bedrails? --  -PB 1  -PB (r) KH (t) PB (c)    Moving from lying on back to sitting on the side of a flat bed without bedrails? --  -PB 1  -PB (r) KH (t) PB (c)    Moving to and from a bed to a chair (including a wheelchair)? --  -PB 1  -PB (r) KH (t) PB (c)    Standing up from a chair using your arms (e.g., wheelchair, bedside chair)? --  -PB 1  -PB (r) KH (t) PB (c)    Climbing 3-5 steps with a railing? --  -PB 1  -PB (r) KH (t) PB (c)    To walk in hospital room? --  -PB 1  -PB (r) KH (t) PB (c)    AM-PAC 6 Clicks Score (PT) --  -PB 6  -PB (r) KH (t)    Highest Level of Mobility Goal --  -PB Bed Activities/Dependent Transfer-2  -PB (r) KH (t)              User Key  (r) = Recorded By, (t) = Taken By, (c) = Cosigned By      Initials Name Provider Type    Migdalia Avina, RN Registered Nurse    Tatyana Kimble RN Extern Registered Nurse    Gertrudis Pepper, REGAN Physical Therapist                                 Physical Therapy Education       Title: PT OT SLP Therapies (Done)       Topic: Physical Therapy (Done)       Point: Mobility training (Done)       Learning Progress Summary            Patient Acceptance, E,D, VU,NR by MS at 5/23/2025 1102                      Point: Home exercise program  (Done)       Learning Progress Summary            Patient Acceptance, E,D, VU,NR by MS at 5/23/2025 1102                      Point: Body mechanics (Done)       Learning Progress Summary            Patient Acceptance, E,D, VU,NR by MS at 5/23/2025 1102                      Point: Precautions (Done)       Learning Progress Summary            Patient Acceptance, E,D, VU,NR by MS at 5/23/2025 1102                                      User Key       Initials Effective Dates Name Provider Type Discipline    MS 06/16/21 -  Jd Kowalski, PT Physical Therapist PT                  PT Recommendation and Plan     Progress: improving  Outcome Evaluation: Pt seen for PT this AM - mod A of 2 for bed mobility and tolerates increased time sitting EOB for LE exercises. noted to fatigue wtih activity and increased posterior lean with dynamic movements. Cues for posture throughout. Unable to progress to tranfers as no KI present in room for L MICHELLE - RN updated. Recommend SNU at d/c.     Time Calculation:         PT Charges       Row Name 05/27/25 1251             Time Calculation    Start Time 1119  -ST      Stop Time 1142  -ST      Time Calculation (min) 23 min  -ST      PT Received On 05/27/25  -ST      PT - Next Appointment 05/28/25  -ST         Time Calculation- PT    Total Timed Code Minutes- PT 23 minute(s)  -ST         Timed Charges    22326 - PT Therapeutic Exercise Minutes 8  -ST      13163 - PT Therapeutic Activity Minutes 15  -ST         Total Minutes    Timed Charges Total Minutes 23  -ST       Total Minutes 23  -ST                User Key  (r) = Recorded By, (t) = Taken By, (c) = Cosigned By      Initials Name Provider Type    ST Gertrudis Hernandez PT Physical Therapist                  Therapy Charges for Today       Code Description Service Date Service Provider Modifiers Qty    83704557981 HC PT THER PROC EA 15 MIN 5/27/2025 Gertrudis Hernandez PT GP 1    44913638404 HC PT THERAPEUTIC ACT EA 15 MIN 5/27/2025  Gertrudis Hernandez, PT GP 1    55172511977 HC PT THER SUPP EA 15 MIN 5/27/2025 Gertrudis Hernandez, PT GP 2            PT G-Codes  Outcome Measure Options: AM-PAC 6 Clicks Daily Activity (OT)  AM-PAC 6 Clicks Score (PT): 8  AM-PAC 6 Clicks Score (OT): 13  PT Discharge Summary  Anticipated Discharge Disposition (PT): skilled nursing facility    Gertrudis Hernandez, PT  5/27/2025

## 2025-05-27 NOTE — NURSING NOTE
"HD completed.  1.5L removed.  TDC works well.  Dressing changed by [facility] according to the [facility] staff with Migdalia Watts RN as \"DRESSING JUAN PABLO\".  New caps placed and heparin locked post tx  "

## 2025-05-27 NOTE — PROGRESS NOTES
"UROLOGY PROGRESS NOTE    NAEO.      Lab Results   Component Value Date    CREATININE 3.64 (H) 05/26/2025    WBC 9.58 05/26/2025    HGB 8.8 (L) 05/26/2025      Urine Culture          11/19/2024    14:30 5/22/2025    10:35   Urine Culture   Urine Culture Final report  No growth       Results for orders placed or performed during the hospital encounter of 03/03/25   Blood Culture - Blood, Arm, Left    Specimen: Arm, Left; Blood   Result Value Ref Range    Blood Culture No growth at 5 days      Results from last 7 days   Lab Units 05/22/25  1035   COLOR UA  Brown*   CLARITY UA  Turbid*   BILIRUBIN UA  Negative   KETONES UA  Negative   PH, URINE  <=5.0   UROBILINOGEN UA  0.2 E.U./dL   LEUKOCYTES UA  Moderate (2+)*   NITRITE UA  Positive*   BACTERIA UA /HPF None Seen       Intake/Output:    Intake/Output Summary (Last 24 hours) at 5/27/2025 0726  Last data filed at 5/26/2025 2046  Gross per 24 hour   Intake 480 ml   Output --   Net 480 ml       Exam:  /49   Pulse 66   Temp 98.3 °F (36.8 °C) (Oral)   Resp 16   Ht 182.9 cm (72\")   Wt 121 kg (265 lb 10.5 oz) Comment: 3 pillows 1 blanket 1 sheet  SpO2 95%   BMI 36.03 kg/m²       : abdi in place -- tea colored with some old bloody sediment in tubing    Assessment:    Anemia    Essential hypertension    Type 2 diabetes mellitus with circulatory disorder, without long-term current use of insulin    SHASTA (obstructive sleep apnea)    H/O aortic valve replacement with porcine valve    Amputated toe of right foot    S/P CABG x 2    Amputated toe of left foot    Atrial fibrillation, persistent    Persistent atrial fibrillation    CKD (chronic kidney disease) stage 4, GFR 15-29 ml/min    Nocturnal hypoxemia    Unsteady gait when walking    Chronic heart failure with preserved ejection fraction (HFpEF)    GI bleed    Peritoneal dialysis status    Hypoalbuminemia    Moderate protein-calorie malnutrition    Acute blood loss anemia      Gross hematuria  ESRD    S/p " cystoscopy clot evacuatoin; TURBT 5/24/25    Plan:    Void trial today  Continue flomax  Ok to restart from urology standpoint (would do this after void trial if no other reason to hold, ie per GI)  -path from TURBT pending      Cat Guaman MD

## 2025-05-27 NOTE — CASE MANAGEMENT/SOCIAL WORK
Continued Stay Note  [facility]     Patient Name: Rock Maciel Jr.  MRN: [id number]  Today's Date: 5/27/2025    Admit Date: 5/21/2025    Plan: Plan skilled care at accepting facility- pre cert needed.  MARIANNE Solis RN   Discharge Plan       Row Name 05/27/25 1437       Plan    Plan Plan skilled care at accepting facility- pre cert needed.  MARIANNE Solis RN    Patient/Family in Agreement with Plan yes    Plan Comments Spoke with pt at bedside. Pt would like referral to [facility] facility.  Pt's first choice is [facility].  Tab[facility]  ( [phone]) called to follow.  Pt is a HD pt .  Mirella ( [phone]) is following for [facility].  Plan skilled care at accepting facility pre cert needed.   MARIANNE Solis RN                   Discharge Codes    No documentation.                 Expected Discharge Date and Time       Expected Discharge Date Expected Discharge Time    May 29, 2025               Emily Solis RN

## 2025-05-27 NOTE — PLAN OF CARE
Goal Outcome Evaluation:  Plan of Care Reviewed With: (P) patient        Progress: (P) no change    Outcome Evaluation: (P) Outcome evaluaton: held bumex and hydralazine due to low BP, dialysis tolerated well 1.5L removed, dressing changed on right foot per ordered, removed abdi cath per order, no urine output bladder scanned 64ml retaining, no urine in brief of urinal. Plan of care ongoing

## 2025-05-27 NOTE — PROGRESS NOTES
Name: Rock Maciel Jr. ADMIT: 2025   : 1945  PCP: Denise Dickson APRN    MRN: [id number] LOS: 6 days   AGE/SEX: 79 y.o. male  ROOM: [facility]     Subjective   Subjective   Chief Complaint   Patient presents with    Abnormal Lab     No chest pain shortness of breath nausea vomiting or abdominal pain.  He is undergoing voiding trial now.     Objective   Objective   Vital Signs  Temp:  [98.3 °F (36.8 °C)] 98.3 °F (36.8 °C)  Heart Rate:  [63-71] 66  Resp:  [16] 16  BP: (110-141)/(46-52) 141/52  SpO2:  [95 %-98 %] 95 %  on  Flow (L/min) (Oxygen Therapy):  [2] 2;   Device (Oxygen Therapy): nasal cannula  Body mass index is 36.03 kg/m².    Physical Exam  Vitals and nursing note reviewed.   Constitutional:       General: He is not in acute distress.     Appearance: He is not diaphoretic.   Cardiovascular:      Rate and Rhythm: Normal rate and regular rhythm.   Pulmonary:      Effort: Pulmonary effort is normal.      Breath sounds: Decreased breath sounds present. No wheezing.   Abdominal:      Palpations: Abdomen is soft.      Tenderness: There is no abdominal tenderness.   Musculoskeletal:      Right lower leg: Edema present.      Left lower leg: Edema present.      Comments: trace   Skin:     General: Skin is dry.   Neurological:      Mental Status: He is alert. Mental status is at baseline.   Psychiatric:         Mood and Affect: Mood normal.         Behavior: Behavior normal.       Results Review  I reviewed the patient's new clinical results.    Results from last 7 days   Lab Units 25  0656 25  0540 25  0510 25  0749   WBC 10*3/mm3 8.02 9.58 13.85* 7.68   HEMOGLOBIN g/dL 8.2* 8.8* 10.1* 8.9*   PLATELETS 10*3/mm3 222 219 229 208     Results from last 7 days   Lab Units 25  0656 25  0540 25  0510 25  0749   SODIUM mmol/L 135* 137 136 135*   POTASSIUM mmol/L 4.6 4.2 4.5 4.3   CHLORIDE mmol/L 103 103 104 99   CO2 mmol/L 24.0 24.4 25.9 27.9   BUN mg/dL 39*  32* 24* 30*   CREATININE mg/dL 4.39* 3.64* 2.74* 3.22*   GLUCOSE mg/dL 78 70 69 107*   EGFR mL/min/1.73 12.9* 16.1* 22.7* 18.7*     Results from last 7 days   Lab Units 05/26/25  0540 05/25/25  0510 05/24/25  0749 05/23/25  0533   ALBUMIN g/dL 2.4* 2.7* 2.8* 2.9*   BILIRUBIN mg/dL 0.2 0.3 0.3 0.4   ALK PHOS U/L 89 91 92 96   AST (SGOT) U/L 13 14 12 13   ALT (SGPT) U/L 10 11 10 10     Results from last 7 days   Lab Units 05/27/25  0656 05/26/25  0540 05/25/25  0510 05/24/25  0749 05/23/25  0533 05/22/25  0535 05/22/25  0535   CALCIUM mg/dL 8.3* 8.4* 8.6 8.4* 8.3*  --  8.2*   ALBUMIN g/dL  --  2.4* 2.7* 2.8* 2.9*  --  2.6*   MAGNESIUM mg/dL  --   --   --   --  2.0  --  2.0   PHOSPHORUS mg/dL 4.6* 4.0 3.6 4.5 3.7   < >  --     < > = values in this interval not displayed.         Glucose   Date/Time Value Ref Range Status   05/27/2025 1039 184 (H) 70 - 130 mg/dL Final   05/27/2025 0604 88 70 - 130 mg/dL Final   05/26/2025 2038 168 (H) 70 - 130 mg/dL Final   05/26/2025 1535 132 (H) 70 - 130 mg/dL Final   05/26/2025 1039 128 70 - 130 mg/dL Final   05/26/2025 0646 71 70 - 130 mg/dL Final   05/25/2025 2016 98 70 - 130 mg/dL Final       No radiology results for the last day    I have personally reviewed all medications:  Scheduled Medications  amiodarone, 200 mg, Oral, Daily  [Held by provider] apixaban, 2.5 mg, Oral, BID  vitamin C, 250 mg, Oral, Daily  atorvastatin, 20 mg, Oral, Nightly  bumetanide, 2 mg, Oral, TID  cetaphil, , Topical, Q12H  clobetasol propionate, 1 Application, Topical, Q12H  [Held by provider] clopidogrel, 75 mg, Oral, Daily  epoetin vince/vince-epbx, 10,000 Units, Subcutaneous, Once per day on Monday Wednesday Friday  [Held by provider] glipizide, 2.5 mg, Oral, BID AC  hydrALAZINE, 10 mg, Oral, TID  insulin lispro, 2-7 Units, Subcutaneous, 4x Daily AC & at Bedtime  [Held by provider] linagliptin, 5 mg, Oral, Daily  Menthol-Zinc Oxide, 1 Application, Topical, BID  metoprolol succinate XL, 25 mg, Oral,  Daily  miconazole, 1 Application, Topical, Q12H  pantoprazole, 40 mg, Intravenous, BID AC  sevelamer, 800 mg, Oral, TID With Meals  sodium chloride, 10 mL, Intravenous, Q12H  sodium chloride, 10 mL, Intravenous, Q12H  sodium chloride, 10 mL, Intravenous, Q12H  sodium hypochlorite, , Topical, BID  tamsulosin, 0.4 mg, Oral, Daily  vancomycin, 125 mg, Oral, Q6H      Infusions  Pharmacy Consult,       Diet  Diet: Regular/House, Cardiac, Diabetic; Healthy Heart (2-3 Na+); Consistent Carbohydrate; Fluid Consistency: Thin (IDDSI 0)       Assessment/Plan     Active Hospital Problems    Diagnosis  POA    **Anemia [D64.9]  Yes    Moderate protein-calorie malnutrition [E44.0]  Yes    GI bleed [K92.2]  Yes    Peritoneal dialysis status [Z99.2]  Not Applicable    Hypoalbuminemia [E88.09]  Yes    Acute blood loss anemia [D62]  Unknown    Chronic heart failure with preserved ejection fraction (HFpEF) [I50.32]  Yes    Unsteady gait when walking [R26.81]  Yes    Nocturnal hypoxemia [G47.34]  Yes    CKD (chronic kidney disease) stage 4, GFR 15-29 ml/min [N18.4]  Yes    Persistent atrial fibrillation [I48.19]  Yes    Atrial fibrillation, persistent [I48.19]  Yes    Amputated toe of left foot [S98.132A]  Yes    S/P CABG x 2 [Z95.1]  Not Applicable    Amputated toe of right foot [S98.131A]  Yes    H/O aortic valve replacement with porcine valve [Z95.3]  Not Applicable    Type 2 diabetes mellitus with circulatory disorder, without long-term current use of insulin [E11.59]  Yes    SHASTA (obstructive sleep apnea) [G47.33]  Yes    Essential hypertension [I10]  Yes      Resolved Hospital Problems   No resolved problems to display.       79 y.o. male admitted with Anemia.    Gross hematuria: Underwent CBI and antiplatelet/anticoagulation held.  Status post cystoscopy with TURBT 5/24.  2 areas were concerning for bleeding and body habitus also affected his procedure.  Pathology pending.  Undergoing voiding trial now.  Potential resumption of  Plavix/Eliquis after the voiding trial.  Urology following.  Heme positive stool: Status post EGD.  No acute bleeding was noted.  GI evaluated.  CKD 5/ESRD: Requiring HD.  Nephrology following.  C. difficile diarrhea: On vancomycin to complete course.  Infectious disease evaluated.  Chronic A-fib/chronic diastolic heart failure/history porcine AVR/history of CABG: Adjust per nephrology needs.  Eliquis as above when able.  Hypertension: Monitoring  Diabetes: Check A1c.  SSI.  PPX: See above  Disposition: SNF/few more days anticipated    Expected Discharge Date: 5/29/2025; Expected Discharge Time:      Raheel Smith MD  [facility]  05/27/25  12:23 EDT    Dictated portions of note using Dragon dictation software.  Copied text in this note has been reviewed by me and remains accurate as of 05/27/25

## 2025-05-28 ENCOUNTER — APPOINTMENT (OUTPATIENT)
Dept: GENERAL RADIOLOGY | Facility: HOSPITAL | Age: 80
End: 2025-05-28
Payer: COMMERCIAL

## 2025-05-28 ENCOUNTER — APPOINTMENT (OUTPATIENT)
Dept: GENERAL RADIOLOGY | Facility: HOSPITAL | Age: 80
End: 2025-05-28
Payer: MEDICARE

## 2025-05-28 LAB
ALBUMIN SERPL-MCNC: 2.7 G/DL (ref 3.5–5.2)
ANION GAP SERPL CALCULATED.3IONS-SCNC: 9.7 MMOL/L (ref 5–15)
BASOPHILS # BLD AUTO: 0.02 10*3/MM3 (ref 0–0.2)
BASOPHILS NFR BLD AUTO: 0.3 % (ref 0–1.5)
BUN SERPL-MCNC: 23 MG/DL (ref 8–23)
BUN/CREAT SERPL: 8.8 (ref 7–25)
CALCIUM SPEC-SCNC: 8.2 MG/DL (ref 8.6–10.5)
CHLORIDE SERPL-SCNC: 100 MMOL/L (ref 98–107)
CO2 SERPL-SCNC: 24.3 MMOL/L (ref 22–29)
CREAT SERPL-MCNC: 2.6 MG/DL (ref 0.76–1.27)
CYTO UR: NORMAL
DEPRECATED RDW RBC AUTO: 53.6 FL (ref 37–54)
EGFRCR SERPLBLD CKD-EPI 2021: 24.2 ML/MIN/1.73
EOSINOPHIL # BLD AUTO: 0.47 10*3/MM3 (ref 0–0.4)
EOSINOPHIL NFR BLD AUTO: 6.7 % (ref 0.3–6.2)
ERYTHROCYTE [DISTWIDTH] IN BLOOD BY AUTOMATED COUNT: 15.1 % (ref 12.3–15.4)
GLUCOSE BLDC GLUCOMTR-MCNC: 157 MG/DL (ref 70–130)
GLUCOSE BLDC GLUCOMTR-MCNC: 166 MG/DL (ref 70–130)
GLUCOSE BLDC GLUCOMTR-MCNC: 182 MG/DL (ref 70–130)
GLUCOSE BLDC GLUCOMTR-MCNC: 89 MG/DL (ref 70–130)
GLUCOSE SERPL-MCNC: 82 MG/DL (ref 65–99)
HBA1C MFR BLD: 5.2 % (ref 4.8–5.6)
HBV SURFACE AB SER RIA-ACNC: NORMAL
HCT VFR BLD AUTO: 27.9 % (ref 37.5–51)
HGB BLD-MCNC: 8.6 G/DL (ref 13–17.7)
IMM GRANULOCYTES # BLD AUTO: 0.04 10*3/MM3 (ref 0–0.05)
IMM GRANULOCYTES NFR BLD AUTO: 0.6 % (ref 0–0.5)
LAB AP CASE REPORT: NORMAL
LYMPHOCYTES # BLD AUTO: 0.7 10*3/MM3 (ref 0.7–3.1)
LYMPHOCYTES NFR BLD AUTO: 10 % (ref 19.6–45.3)
MAGNESIUM SERPL-MCNC: 2 MG/DL (ref 1.6–2.4)
MCH RBC QN AUTO: 30.1 PG (ref 26.6–33)
MCHC RBC AUTO-ENTMCNC: 30.8 G/DL (ref 31.5–35.7)
MCV RBC AUTO: 97.6 FL (ref 79–97)
MONOCYTES # BLD AUTO: 0.55 10*3/MM3 (ref 0.1–0.9)
MONOCYTES NFR BLD AUTO: 7.8 % (ref 5–12)
NEUTROPHILS NFR BLD AUTO: 5.23 10*3/MM3 (ref 1.7–7)
NEUTROPHILS NFR BLD AUTO: 74.6 % (ref 42.7–76)
NRBC BLD AUTO-RTO: 0 /100 WBC (ref 0–0.2)
PATH REPORT.FINAL DX SPEC: NORMAL
PATH REPORT.GROSS SPEC: NORMAL
PHOSPHATE SERPL-MCNC: 3.1 MG/DL (ref 2.5–4.5)
PLATELET # BLD AUTO: 191 10*3/MM3 (ref 140–450)
PMV BLD AUTO: 9 FL (ref 6–12)
POTASSIUM SERPL-SCNC: 4 MMOL/L (ref 3.5–5.2)
RBC # BLD AUTO: 2.86 10*6/MM3 (ref 4.14–5.8)
SODIUM SERPL-SCNC: 134 MMOL/L (ref 136–145)
WBC NRBC COR # BLD AUTO: 7.01 10*3/MM3 (ref 3.4–10.8)

## 2025-05-28 PROCEDURE — 97535 SELF CARE MNGMENT TRAINING: CPT

## 2025-05-28 PROCEDURE — 80069 RENAL FUNCTION PANEL: CPT | Performed by: INTERNAL MEDICINE

## 2025-05-28 PROCEDURE — 97530 THERAPEUTIC ACTIVITIES: CPT

## 2025-05-28 PROCEDURE — 83735 ASSAY OF MAGNESIUM: CPT | Performed by: INTERNAL MEDICINE

## 2025-05-28 PROCEDURE — 83036 HEMOGLOBIN GLYCOSYLATED A1C: CPT | Performed by: INTERNAL MEDICINE

## 2025-05-28 PROCEDURE — 97110 THERAPEUTIC EXERCISES: CPT

## 2025-05-28 PROCEDURE — 86706 HEP B SURFACE ANTIBODY: CPT | Performed by: INTERNAL MEDICINE

## 2025-05-28 PROCEDURE — 71045 X-RAY EXAM CHEST 1 VIEW: CPT

## 2025-05-28 PROCEDURE — 25010000002 EPOETIN ALFA-EPBX 10000 UNIT/ML SOLUTION: Performed by: UROLOGY

## 2025-05-28 PROCEDURE — 63710000001 INSULIN LISPRO (HUMAN) PER 5 UNITS: Performed by: UROLOGY

## 2025-05-28 PROCEDURE — 85025 COMPLETE CBC W/AUTO DIFF WBC: CPT | Performed by: UROLOGY

## 2025-05-28 PROCEDURE — 82948 REAGENT STRIP/BLOOD GLUCOSE: CPT

## 2025-05-28 RX ORDER — DOCUSATE SODIUM 100 MG/1
100 CAPSULE, LIQUID FILLED ORAL 2 TIMES DAILY
Status: DISCONTINUED | OUTPATIENT
Start: 2025-05-28 | End: 2025-05-29

## 2025-05-28 RX ORDER — POLYETHYLENE GLYCOL 3350 17 G/17G
17 POWDER, FOR SOLUTION ORAL DAILY PRN
Status: DISCONTINUED | OUTPATIENT
Start: 2025-05-28 | End: 2025-05-29

## 2025-05-28 RX ADMIN — Medication: at 22:08

## 2025-05-28 RX ADMIN — DOCUSATE SODIUM 100 MG: 100 CAPSULE, LIQUID FILLED ORAL at 22:06

## 2025-05-28 RX ADMIN — Medication: at 08:01

## 2025-05-28 RX ADMIN — TAMSULOSIN HYDROCHLORIDE 0.4 MG: 0.4 CAPSULE ORAL at 08:01

## 2025-05-28 RX ADMIN — PANTOPRAZOLE SODIUM 40 MG: 40 TABLET, DELAYED RELEASE ORAL at 06:01

## 2025-05-28 RX ADMIN — CAMPHOR, MENTHOL: .5; .5 LOTION TOPICAL at 22:08

## 2025-05-28 RX ADMIN — Medication 10 ML: at 22:08

## 2025-05-28 RX ADMIN — SEVELAMER CARBONATE 800 MG: 800 TABLET, FILM COATED ORAL at 08:00

## 2025-05-28 RX ADMIN — METOPROLOL SUCCINATE 25 MG: 25 TABLET, EXTENDED RELEASE ORAL at 08:00

## 2025-05-28 RX ADMIN — AMIODARONE HYDROCHLORIDE 200 MG: 200 TABLET ORAL at 08:00

## 2025-05-28 RX ADMIN — PANTOPRAZOLE SODIUM 40 MG: 40 TABLET, DELAYED RELEASE ORAL at 18:21

## 2025-05-28 RX ADMIN — INSULIN LISPRO 2 UNITS: 100 INJECTION, SOLUTION INTRAVENOUS; SUBCUTANEOUS at 18:21

## 2025-05-28 RX ADMIN — DAKIN'S SOLUTION 0.125% (QUARTER STRENGTH): 0.12 SOLUTION at 22:09

## 2025-05-28 RX ADMIN — VANCOMYCIN HYDROCHLORIDE 125 MG: 125 CAPSULE ORAL at 06:00

## 2025-05-28 RX ADMIN — VANCOMYCIN HYDROCHLORIDE 125 MG: 125 CAPSULE ORAL at 11:02

## 2025-05-28 RX ADMIN — OXYCODONE HYDROCHLORIDE AND ACETAMINOPHEN 250 MG: 500 TABLET ORAL at 08:00

## 2025-05-28 RX ADMIN — MENTHOL, ZINC OXIDE 1 APPLICATION: .44; 20.6 OINTMENT TOPICAL at 22:07

## 2025-05-28 RX ADMIN — VANCOMYCIN HYDROCHLORIDE 125 MG: 125 CAPSULE ORAL at 18:21

## 2025-05-28 RX ADMIN — MENTHOL, ZINC OXIDE 1 APPLICATION: .44; 20.6 OINTMENT TOPICAL at 08:01

## 2025-05-28 RX ADMIN — HYDROXYZINE HYDROCHLORIDE 25 MG: 25 TABLET, FILM COATED ORAL at 22:13

## 2025-05-28 RX ADMIN — ANTI-FUNGAL POWDER MICONAZOLE NITRATE TALC FREE 1 APPLICATION: 1.42 POWDER TOPICAL at 22:06

## 2025-05-28 RX ADMIN — Medication 10 ML: at 08:01

## 2025-05-28 RX ADMIN — SEVELAMER CARBONATE 800 MG: 800 TABLET, FILM COATED ORAL at 18:21

## 2025-05-28 RX ADMIN — EPOETIN ALFA-EPBX 10000 UNITS: 10000 INJECTION, SOLUTION INTRAVENOUS; SUBCUTANEOUS at 08:01

## 2025-05-28 RX ADMIN — SEVELAMER CARBONATE 800 MG: 800 TABLET, FILM COATED ORAL at 11:02

## 2025-05-28 RX ADMIN — BUMETANIDE 2 MG: 1 TABLET ORAL at 11:02

## 2025-05-28 RX ADMIN — CLOBETASOL PROPIONATE CREAM USP, 0.05% 1 APPLICATION: 0.5 CREAM TOPICAL at 08:01

## 2025-05-28 RX ADMIN — CLOBETASOL PROPIONATE CREAM USP, 0.05% 1 APPLICATION: 0.5 CREAM TOPICAL at 22:08

## 2025-05-28 RX ADMIN — DAKIN'S SOLUTION 0.125% (QUARTER STRENGTH): 0.12 SOLUTION at 08:01

## 2025-05-28 RX ADMIN — OXYCODONE AND ACETAMINOPHEN 1 TABLET: 5; 325 TABLET ORAL at 08:00

## 2025-05-28 RX ADMIN — ANTI-FUNGAL POWDER MICONAZOLE NITRATE TALC FREE 1 APPLICATION: 1.42 POWDER TOPICAL at 08:02

## 2025-05-28 RX ADMIN — CLOPIDOGREL BISULFATE 75 MG: 75 TABLET, FILM COATED ORAL at 18:21

## 2025-05-28 RX ADMIN — HYDRALAZINE HYDROCHLORIDE 10 MG: 10 TABLET ORAL at 15:05

## 2025-05-28 RX ADMIN — ATORVASTATIN CALCIUM 20 MG: 20 TABLET, FILM COATED ORAL at 22:06

## 2025-05-28 RX ADMIN — BUMETANIDE 2 MG: 1 TABLET ORAL at 18:21

## 2025-05-28 NOTE — PROGRESS NOTES
"      FOLLOW UP NOTE      Name: Rock Maciel Jr. ADMIT: 2025   : 1945  PCP: Denise Dickson APRN    MRN: [id number] LOS: 7 days   AGE/SEX: 79 y.o. male  ROOM: [facility]     Date of Service: 2025                           CHIEF COMPLIANTS / REASON FOR FOLLOW UP          KILLIAN/CKD NXT stage 5 days/week    Subjective:      Patient sitting up at edge of bed.  Having trouble with voiding.  Dialyzed yesterday with 1.5 L UF.    Review of Systems:       Negative except as above       OBJECTIVE                                                                        Exam:  /52   Pulse 70   Temp 97.9 °F (36.6 °C) (Oral)   Resp 16   Ht 182.9 cm (72\")   Wt 118 kg (259 lb 14.8 oz)   SpO2 97%   BMI 35.25 kg/m²   Intake/Output last 3 shifts:  I/O last 3 completed shifts:  In: 610 [P.O.:610]  Out: 1500   Intake/Output this shift:  No intake/output data recorded.    GEN: Pleasant chronically ill elderly gentleman in no acute distress  RESP: clear to auscultation bilaterally, no crackles/wheezing  CVS: RRR, S1, S2+  ABD: soft, nontender, nondistended  NEURO: AAOX3  EXT: Minimal lower extremity edema  ACCESS: RIJ TDC      Scheduled Meds:amiodarone, 200 mg, Oral, Daily  [Held by provider] apixaban, 2.5 mg, Oral, BID  vitamin C, 250 mg, Oral, Daily  atorvastatin, 20 mg, Oral, Nightly  bumetanide, 2 mg, Oral, TID  cetaphil, , Topical, Q12H  clobetasol propionate, 1 Application, Topical, Q12H  [Held by provider] clopidogrel, 75 mg, Oral, Daily  epoetin vince/vince-epbx, 10,000 Units, Subcutaneous, Once per day on   hydrALAZINE, 10 mg, Oral, TID  insulin lispro, 2-7 Units, Subcutaneous, 4x Daily AC & at Bedtime  [Held by provider] linagliptin, 5 mg, Oral, Daily  Menthol-Zinc Oxide, 1 Application, Topical, BID  metoprolol succinate XL, 25 mg, Oral, Daily  miconazole, 1 Application, Topical, Q12H  pantoprazole, 40 mg, Oral, BID AC  sevelamer, 800 mg, Oral, TID With Meals  sodium chloride, 10 " mL, Intravenous, Q12H  sodium chloride, 10 mL, Intravenous, Q12H  sodium chloride, 10 mL, Intravenous, Q12H  sodium hypochlorite, , Topical, BID  tamsulosin, 0.4 mg, Oral, Daily  vancomycin, 125 mg, Oral, Q6H      Continuous Infusions:Pharmacy Consult,       PRN Meds:  acetaminophen **OR** acetaminophen **OR** acetaminophen    calcium carbonate    camphor-menthol    dextrose    dextrose    glucagon (human recombinant)    heparin (porcine)    heparin    hydrOXYzine    lidocaine    melatonin    nitroglycerin    ondansetron ODT **OR** ondansetron    oxybutynin    oxyCODONE-acetaminophen    Pharmacy Consult    sodium chloride    sodium chloride    sodium chloride    sodium chloride    sodium chloride    sodium chloride    sodium chloride         Data Review:                                                                           Labs reviewed        Imaging:                                                                           Radiology reviewed           ASSESSMENT:                                                                                Anemia    Essential hypertension    Type 2 diabetes mellitus with circulatory disorder, without long-term current use of insulin    SHASTA (obstructive sleep apnea)    H/O aortic valve replacement with porcine valve    Amputated toe of right foot    S/P CABG x 2    Amputated toe of left foot    Atrial fibrillation, persistent    Persistent atrial fibrillation    CKD (chronic kidney disease) stage 4, GFR 15-29 ml/min    Nocturnal hypoxemia    Unsteady gait when walking    Chronic heart failure with preserved ejection fraction (HFpEF)    GI bleed    Peritoneal dialysis status    Hypoalbuminemia    Moderate protein-calorie malnutrition    Acute blood loss anemia     CKD with hospitalization in March 2025 requiring HD due to KILLIAN likely ATN 2/2 rhabdomyolysis, prerenal insult related to diuretics. OM, now on next stage HD 5 days per week (MTWThF) with RIJ TDC  Acute TYLER,  hematuria  Urinary retention  Bladder mass s/p resection  A-fib with controlled rates.  History of HFpEF with pulmonary hypertension  Recent osteomyelitis and abscess right foot  T2DM  HTN  A-fib  CAD  PVD   TTE 5/23/25 with EF 56-60%, mild transvalvular regurgitation in prosthetic AV, RVSP >55 mmHg      PLAN:                                                                            HD completed yesterday.  Plan for HD tomorrow and will maintain TTS schedule while admitted.  Gross hematuria managed by urology.  Requiring CIC's.  Urology following.  Continue with diuretics.  Continue antihypertensives.  Continue phosphate binder.  Continue EPO with HD.  Status post EGD, GI input noted  On p.o. vancomycin for C. difficile.       Tiesha Mccrary MD  [facility]  5/28/2025  10:51 EDT

## 2025-05-28 NOTE — PROGRESS NOTES
"  First Urology Progress Note    Chief Complaint: No complaints    Catheter was removed yesterday.  He has been unable to void and requiring intermittent catheterization.  No hematuria noted.  His pathology is still pending.  Labs are stable    Review of Systems:    The following systems were reviewed and negative;  respiratory, cardiovascular, and gastrointestinal          Vital Signs  /52   Pulse 70   Temp 97.9 °F (36.6 °C) (Oral)   Resp 16   Ht 182.9 cm (72\")   Wt 118 kg (259 lb 14.8 oz)   SpO2 97%   BMI 35.25 kg/m²     Physical Exam:     General Appearance:    Alert, cooperative, NAD   HEENT:    No trauma, pupils reactive, hearing intact   Back:     No CVA tenderness   Lungs:     Respirations unlabored, no wheezing    Heart:    RRR, intact peripheral pulses   Abdomen:   Soft protuberant but no bladder   :  Genitalia normal   Extremities:   No edema, no deformity   Lymphatic:   No neck or groin LAD   Skin:   No bleeding, bruising or rashes   Neuro/Psych:   Orientation intact, mood/affect pleasant, no focal findings        Results Review:     I reviewed the patient's new clinical results.  Lab Results (last 24 hours)       Procedure Component Value Units Date/Time    Hemoglobin A1c [[id number]]  (Normal) Collected: 05/28/25 0628    Specimen: Blood Updated: 05/28/25 0802     Hemoglobin A1C 5.20 %     Narrative:      Hemoglobin A1C Ranges:    Increased Risk for Diabetes  5.7% to 6.4%  Diabetes                     >= 6.5%  Diabetic Goal                < 7.0%    Renal Function Panel [[id number]]  (Abnormal) Collected: 05/28/25 0628    Specimen: Blood Updated: 05/28/25 0717     Glucose 82 mg/dL      BUN 23.0 mg/dL      Creatinine 2.60 mg/dL      Sodium 134 mmol/L      Potassium 4.0 mmol/L      Comment: Slight hemolysis detected by analyzer. Result may be falsely elevated.        Chloride 100 mmol/L      CO2 24.3 mmol/L      Calcium 8.2 mg/dL      Albumin 2.7 g/dL      Phosphorus 3.1 mg/dL      Anion Gap " 9.7 mmol/L      BUN/Creatinine Ratio 8.8     eGFR 24.2 mL/min/1.73     Narrative:      GFR Categories in Chronic Kidney Disease (CKD)              GFR Category          GFR (mL/min/1.73)    Interpretation  G1                    90 or greater        Normal or high (1)  G2                    60-89                Mild decrease (1)  G3a                   45-59                Mild to moderate decrease  G3b                   30-44                Moderate to severe decrease  G4                    15-29                Severe decrease  G5                    14 or less           Kidney failure    (1)In the absence of evidence of kidney disease, neither GFR category G1 or G2 fulfill the criteria for CKD.    eGFR calculation 2021 CKD-EPI creatinine equation, which does not include race as a factor    Magnesium [[id number]]  (Normal) Collected: 05/28/25 0628    Specimen: Blood Updated: 05/28/25 0714     Magnesium 2.0 mg/dL     CBC & Differential [[id number]]  (Abnormal) Collected: 05/28/25 0628    Specimen: Blood Updated: 05/28/25 0658    Narrative:      The following orders were created for panel order CBC & Differential.  Procedure                               Abnormality         Status                     ---------                               -----------         ------                     CBC Auto Differential[[id number]]        Abnormal            Final result                 Please view results for these tests on the individual orders.    CBC Auto Differential [[id number]]  (Abnormal) Collected: 05/28/25 0628    Specimen: Blood Updated: 05/28/25 0658     WBC 7.01 10*3/mm3      RBC 2.86 10*6/mm3      Hemoglobin 8.6 g/dL      Hematocrit 27.9 %      MCV 97.6 fL      MCH 30.1 pg      MCHC 30.8 g/dL      RDW 15.1 %      RDW-SD 53.6 fl      MPV 9.0 fL      Platelets 191 10*3/mm3      Neutrophil % 74.6 %      Lymphocyte % 10.0 %      Monocyte % 7.8 %      Eosinophil % 6.7 %      Basophil % 0.3 %      Immature Grans % 0.6 %       Neutrophils, Absolute 5.23 10*3/mm3      Lymphocytes, Absolute 0.70 10*3/mm3      Monocytes, Absolute 0.55 10*3/mm3      Eosinophils, Absolute 0.47 10*3/mm3      Basophils, Absolute 0.02 10*3/mm3      Immature Grans, Absolute 0.04 10*3/mm3      nRBC 0.0 /100 WBC     POC Glucose Once [[id number]]  (Normal) Collected: 05/28/25 0655    Specimen: Blood Updated: 05/28/25 0656     Glucose 89 mg/dL     POC Glucose Once [[id number]]  (Abnormal) Collected: 05/27/25 2048    Specimen: Blood Updated: 05/27/25 2049     Glucose 177 mg/dL     POC Glucose Once [[id number]]  (Abnormal) Collected: 05/27/25 1530    Specimen: Blood Updated: 05/27/25 1531     Glucose 135 mg/dL     POC Glucose Once [[id number]]  (Abnormal) Collected: 05/27/25 1039    Specimen: Blood Updated: 05/27/25 1039     Glucose 184 mg/dL           Imaging Results (Last 24 Hours)       ** No results found for the last 24 hours. **            Medication Review:   I have personally reviewed    Current Facility-Administered Medications:     acetaminophen (TYLENOL) tablet 650 mg, 650 mg, Oral, Q4H PRN, 650 mg at 05/22/25 0019 **OR** acetaminophen (TYLENOL) 160 MG/5ML oral solution 650 mg, 650 mg, Oral, Q4H PRN **OR** acetaminophen (TYLENOL) suppository 650 mg, 650 mg, Rectal, Q4H PRN, Prosper Cornejo MD    amiodarone (PACERONE) tablet 200 mg, 200 mg, Oral, Daily, Prosper Cornejo MD, 200 mg at 05/28/25 0800    [Held by provider] apixaban (ELIQUIS) tablet 2.5 mg, 2.5 mg, Oral, BID, Maria Isabel Parisi MD    ascorbic acid (VITAMIN C) tablet 250 mg, 250 mg, Oral, Daily, Prosper Cornejo MD, 250 mg at 05/28/25 0800    atorvastatin (LIPITOR) tablet 20 mg, 20 mg, Oral, Nightly, Prosper Cornejo MD, 20 mg at 05/27/25 2039    bumetanide (BUMEX) tablet 2 mg, 2 mg, Oral, TID, Prosper Cornejo MD, 2 mg at 05/26/25 1654    calcium carbonate (TUMS) chewable tablet 500 mg (200 mg elemental), 2 tablet, Oral, BID PRN, Prosper Cornejo MD     camphor-menthol (SARNA) 0.5-0.5 % lotion, , Topical, 4x Daily PRN, Prosper Cornejo MD, Given at 05/27/25 2040    cetaphil lotion, , Topical, Q12H, Prosper Cornejo MD, Given at 05/28/25 0801    clobetasol propionate (TEMOVATE) 0.05 % cream 1 Application, 1 Application, Topical, Q12H, Prosper Cornejo MD, 1 Application at 05/28/25 0801    [Held by provider] clopidogrel (PLAVIX) tablet 75 mg, 75 mg, Oral, Daily, Raheel Smith MD    dextrose (D50W) (25 g/50 mL) IV injection 25 g, 25 g, Intravenous, Q15 Min PRN, Prosper Cornejo MD    dextrose (GLUTOSE) oral gel 15 g, 15 g, Oral, Q15 Min PRN, Prosper Cornejo MD    epoetin vince-epbx (RETACRIT) injection 10,000 Units, 10,000 Units, Subcutaneous, Once per day on Monday Wednesday Friday, Prosper Cornejo MD, 10,000 Units at 05/28/25 0801    glucagon (GLUCAGEN) injection 1 mg, 1 mg, Intramuscular, Q15 Min PRN, Prosper Cornejo MD    heparin (porcine) injection 4,000 Units, 4,000 Units, Intracatheter, PRN, Tiesha Mccrary MD, 4,000 Units at 05/27/25 1703    heparin injection 300 Units, 3 mL, Intravenous, Daily PRN, Prosper Cornejo MD    hydrALAZINE (APRESOLINE) tablet 10 mg, 10 mg, Oral, TID, Prosper Cornejo MD, 10 mg at 05/26/25 0908    hydrOXYzine (ATARAX) tablet 25 mg, 25 mg, Oral, TID PRN, Prosper Cornejo MD, 25 mg at 05/26/25 2046    insulin lispro (HUMALOG/ADMELOG) injection 2-7 Units, 2-7 Units, Subcutaneous, 4x Daily AC & at Bedtime, Prosper Cornejo MD, 2 Units at 05/27/25 2147    Lidocaine HCl gel (XYLOCAINE) urethral/mucosal syringe, , Urethral, PRN, Prosper Cornejo MD    [Held by provider] linagliptin (TRADJENTA) tablet 5 mg, 5 mg, Oral, Daily, Maria Isabel Parisi MD    melatonin tablet 5 mg, 5 mg, Oral, Nightly PRN, Prosper Cornejo MD, 5 mg at 05/26/25 2046    Menthol-Zinc Oxide 1 Application, 1 Application, Topical, BID, Prosper Cornejo MD, 1 Application at 05/28/25 0801     metoprolol succinate XL (TOPROL-XL) 24 hr tablet 25 mg, 25 mg, Oral, Daily, Prosper Cornejo MD, 25 mg at 05/28/25 0800    miconazole (MICOTIN) 2 % powder 1 Application, 1 Application, Topical, Q12H, Prosper Cornejo MD, 1 Application at 05/28/25 0802    nitroglycerin (NITROSTAT) SL tablet 0.4 mg, 0.4 mg, Sublingual, Q5 Min PRN, Prosper Cornejo MD    ondansetron ODT (ZOFRAN-ODT) disintegrating tablet 4 mg, 4 mg, Oral, Q6H PRN **OR** ondansetron (ZOFRAN) injection 4 mg, 4 mg, Intravenous, Q6H PRN, Prosper Cornejo MD    oxybutynin (DITROPAN) tablet 5 mg, 5 mg, Oral, TID PRN, Prosper Cornejo MD, 5 mg at 05/22/25 1141    oxyCODONE-acetaminophen (PERCOCET) 5-325 MG per tablet 1 tablet, 1 tablet, Oral, Q4H PRN, Prosper Cornejo MD, 1 tablet at 05/28/25 0800    pantoprazole (PROTONIX) EC tablet 40 mg, 40 mg, Oral, BID AC, Raheel Smith MD, 40 mg at 05/28/25 0601    Pharmacy Consult, , Not Applicable, Continuous PRN, Wesley Christianson MD    sevelamer (RENVELA) tablet 800 mg, 800 mg, Oral, TID With Meals, Prosper Cornejo MD, 800 mg at 05/28/25 0800    sodium chloride 0.9 % flush 10 mL, 10 mL, Intravenous, PRN, Prosper Cornejo MD    sodium chloride 0.9 % flush 10 mL, 10 mL, Intravenous, Q12H, Prosper Cornejo MD, 10 mL at 05/28/25 0801    sodium chloride 0.9 % flush 10 mL, 10 mL, Intravenous, PRN, Prosper Cornejo MD    sodium chloride 0.9 % flush 10 mL, 10 mL, Intravenous, Q12H, Prosper Cornejo MD, 10 mL at 05/28/25 0801    sodium chloride 0.9 % flush 10 mL, 10 mL, Intravenous, Q12H, Prosper Cornejo MD, 10 mL at 05/28/25 0801    sodium chloride 0.9 % flush 10 mL, 10 mL, Intravenous, PRN, Prosper Cornejo MD    sodium chloride 0.9 % flush 20 mL, 20 mL, Intravenous, Clemente AHUJA Christopher E, MD    sodium chloride 0.9 % infusion 40 mL, 40 mL, Intravenous, PRNClemente Christopher E, MD    sodium chloride 0.9 % infusion 40 mL, 40 mL, Intravenous,  PRN, Prosper Cornejo MD    sodium chloride nasal spray 1 spray, 1 spray, Each Nare, Q30 Min PRN, Prosper Cornejo MD, 1 spray at 05/27/25 0837    sodium hypochlorite (DAKIN'S 1/4 STRENGTH) 0.125 % topical solution 0.125% solution, , Topical, BID, Prosper Cornejo MD, Given at 05/28/25 0801    tamsulosin (FLOMAX) 24 hr capsule 0.4 mg, 0.4 mg, Oral, Daily, Prosper Cornejo MD, 0.4 mg at 05/28/25 0801    vancomycin (VANCOCIN) capsule 125 mg, 125 mg, Oral, Q6H, Wesley Christianson MD, 125 mg at 05/28/25 0600    Allergies:    Ceclor [cefaclor]    Assessment:    Active Problems:    Anemia    Essential hypertension    Type 2 diabetes mellitus with circulatory disorder, without long-term current use of insulin    SHASTA (obstructive sleep apnea)    H/O aortic valve replacement with porcine valve    Amputated toe of right foot    S/P CABG x 2    Amputated toe of left foot    Atrial fibrillation, persistent    Persistent atrial fibrillation    CKD (chronic kidney disease) stage 4, GFR 15-29 ml/min    Nocturnal hypoxemia    Unsteady gait when walking    Chronic heart failure with preserved ejection fraction (HFpEF)    GI bleed    Peritoneal dialysis status    Hypoalbuminemia    Moderate protein-calorie malnutrition    Acute blood loss anemia       Gross hematuria with clot retention now resolved.  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  Bladder mass excised with pathology pending    Plan:    Okay to rehab anytime from our standpoint.  They can do intermittent catheterization there and we can start teach him how to do this at rehab as well.      Prosper Cornejo MD    5/28/2025  09:54 EDT

## 2025-05-28 NOTE — PLAN OF CARE
Goal Outcome Evaluation:  Plan of Care Reviewed With: patient        Progress: improving  Outcome Evaluation: Pt. agreable to skilled tx. this date and was able to demo goof progress with functional goals. Pt. was able to demo good sitting balance as well as improved tolerance to sitting EOB this date as pt. was sitting for ~15 min. Pt. continues to require significnat cuing for maintaing neutral sitting posture and adjusting back to neutral. Pt. reporeted that impairment with RUE is ~1 month old (unsure). Pt. would benefit from continued skileld tx. to improve (I) with self care.

## 2025-05-28 NOTE — THERAPY TREATMENT NOTE
Patient Name: Rock Maciel Jr.  : 1945    MRN: [id number]                              Today's Date: 2025       Admit Date: 2025    Visit Dx:     ICD-10-CM ICD-9-CM   1. Acute blood loss anemia  D62 285.1   2. Rectal bleeding  K62.5 569.3   3. ESRD on hemodialysis  N18.6 585.6    Z99.2 V45.11   4. Gastrointestinal hemorrhage, unspecified gastrointestinal hemorrhage type  K92.2 578.9     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    Chronic venous insufficiency    Nonrheumatic aortic valve stenosis    Carotid stenosis, asymptomatic    Bradycardia    Hyperlipidemia    Bilateral carotid artery disease    Abnormal cardiovascular stress test    H/O aortic valve replacement with porcine valve    Charcot-Davida-Tooth disease-like deformity of foot    Amputated toe of right foot    Fall    Non-traumatic rhabdomyolysis    S/P CABG x 2    CKD (chronic kidney disease) stage 3, GFR 30-59 ml/min    Rupture of left quadriceps tendon    Constipation    KILLIAN (acute kidney injury)    Wound of right leg    Anemia    Chronic diastolic (congestive) heart failure    Amputated toe of left foot    Gas gangrene    Cellulitis of left lower limb    Acquired absence of left foot    Bilateral lower extremity edema    Stage 3b chronic kidney disease    History of pneumonia    Atelectasis of both lungs    Abnormal pleural fluid    Pleural thickening    Atrial fibrillation, persistent    Chronic anticoagulation    Persistent atrial fibrillation    Bladder wall thickening    Hematuria    CKD (chronic kidney disease) stage 4, GFR 15-29  ml/min    Hypoxemia    Nocturnal hypoxemia    Status post left inguinal hernia repair, follow-up exam    Weakness of both lower extremities    Unsteady gait when walking    Wound, open, arm, forearm, right, subsequent encounter    Traumatic rhabdomyolysis    Closed displaced fracture of left clavicle    Pneumonia due to infectious organism    ATN (acute tubular necrosis)    Contusion of left knee    Acute osteomyelitis of right foot    Diabetic foot infection    MRSA (methicillin resistant Staphylococcus aureus) infection    Acute kidney injury    Chronic heart failure with preserved ejection fraction (HFpEF)    GI bleed    Peritoneal dialysis status    Hypoalbuminemia    Moderate protein-calorie malnutrition    Acute blood loss anemia     Past Medical History:   Diagnosis Date    Acquired absence of left foot 02/19/2022    Acute osteomyelitis of right foot 03/07/2025    Allergic rhinitis     Amputated toe of left foot 02/12/2021    Amputated toe of right foot 04/11/2019    ATN (acute tubular necrosis) 03/05/2025    Atrial fibrillation, persistent 10/18/2024    Bladder wall thickening 12/01/2024    Bronchitis     Carotid stenosis, asymptomatic 01/30/2017    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  Added automatically from request for surgery [id number]      Gout     H/O aortic valve replacement with porcine valve 09/10/2018    Hiatal hernia     History of MRSA infection     RIGHT FOOT 2010    Hyperlipidemia     Hypertension     LAFB (left  anterior fascicular block) 05/17/2016    MRSA (methicillin resistant Staphylococcus aureus) infection 03/08/2025    Murmur     Neuropathic arthropathy 05/17/2016    Overview:   2015 IMO UPDATE  Overview:   2015 IMO UPDATE      Nocturnal hypoxemia 12/01/2024    Nonrheumatic aortic valve stenosis 01/30/2017    Persistent atrial fibrillation 11/07/2024    Pneumothorax on right     S/P CABG x 2 05/06/2019 July 2018      Sleep apnea     pt wears CPAP at night    Status post left inguinal hernia repair, follow-up exam 01/05/2025    Traumatic rhabdomyolysis 03/03/2025    Type 2 diabetes mellitus with circulatory disorder, without long-term current use of insulin 05/17/2016    Unsteady gait when walking 01/05/2025    Wellness examination 06/24/2015    Annual Wellness Visit     Past Surgical History:   Procedure Laterality Date    ARTERY SURGERY Bilateral     carotid    BONE EXCISION LEG Right 3/10/2025    Procedure: BONE EXCISION LOWER EXTERMITY, RIGHT;  Surgeon: Jimenez Mchugh DPM;  Location: [facility] MAIN OR;  Service: Podiatry;  Laterality: Right;    CARDIAC CATHETERIZATION N/A 7/20/2018    Procedure: Left Heart Cath;  Surgeon: Jo Toney MD;  Location:  [facility] CATH INVASIVE LOCATION;  Service: Cardiovascular    CARDIAC CATHETERIZATION N/A 7/20/2018    Procedure: Coronary angiography;  Surgeon: Jo Toney MD;  Location[facility];  Service: Cardiovascular    CAROTID ENDARTERECTOMY Bilateral     COLONOSCOPY  2008    CORONARY ARTERY BYPASS GRAFT WITH AORTIC VALVE REPAIR/REPLACEMENT N/A 7/23/2018    Procedure: INTRAOPERATIVE ALEX, MIDLINE STERNOTOMY, CORONARY ARTERY BYPASS GRAFTING X 3 USING ENDOSCOPICALLY HARVESTED LEFT GREATER SAPHENOUS VEIN,  AORTIC VALVE REPLACEMENT USING 25MM LOPEZ II ULTRA PORCINE VALVE, PRP;  Surgeon: Phong Posey MD;  Location: [facility] MAIN OR;  Service: Cardiothoracic    CYSTOSCOPY WITH CLOT EVACUATION N/A 5/24/2025    Procedure: CYSTOSCOPY WITH  CLOT EVACUATION, RESECTION OF BLADDER MASS AND PLACEMENT OF CONTINOUS BLADDER IRRIGATION CATHETER;  Surgeon: Prosper Cornejo MD;  Location: [facility] MAIN OR;  Service: Urology;  Laterality: N/A;    ENDOSCOPY N/A 5/25/2025    Procedure: ESOPHAGOGASTRODUODENOSCOPY with biopsy;  Surgeon: Darrian Webb MD;  Location: [facility] ENDOSCOPY;  Service: Gastroenterology;  Laterality: N/A;  congested gastropathy    FOOT SURGERY Right 2010    5th digit removal    FOOT SURGERY Left 2011    1 digit removed    INCISION AND DRAINAGE LEG Right 3/28/2020    Procedure: DEBRIDMENT OF RIGHT CALF;  Surgeon: Ross Pineda MD;  Location: [facility] MAIN OR;  Service: Vascular;  Laterality: Right;    INCISION AND DRAINAGE LEG Right 3/10/2025    Procedure: INCISION AND DRAINAGE LOWER EXTREMITY;  Surgeon: Jimenez Mchugh DPM;  Location: [facility] MAIN OR;  Service: Podiatry;  Laterality: Right;    INGUINAL HERNIA REPAIR Left 11/29/2024    Procedure: INGUINAL HERNIA REPAIR LAPAROSCOPIC WITH DAVINCI ROBOT;  Surgeon: Phong Lazo MD;  Location: [facility] MAIN OR;  Service: Robotics - DaVinci;  Laterality: Left;    INSERTION HEMODIALYSIS CATHETER N/A 3/11/2025    Procedure: HEMODIALYSIS CATHETER INSERTION;  Surgeon: Jeaneth Bonds MD;  Location: [facility] MAIN OR;  Service: Vascular;  Laterality: N/A;    QUADRICEPS TENDON REPAIR Left 5/14/2019    Procedure: Left QUADRICEPS TENDON REPAIR;  Surgeon: Camilo Hunter MD;  Location: [facility] MAIN OR;  Service: Orthopedics    THORACENTESIS Right 11/21/2016    THORACOSCOPY Right 5/8/2017    Procedure: BRONCHOSCOPY, RIGHT VAT,  TOTAL DECORTICATION RIGHT LUNG, PLEURAL BX, PLACEMENT SUBPLEURAL PAIN CAATHETERS X2;  Surgeon: Donald Orlando III, MD;  Location: [facility] MAIN OR;  Service:     TONSILECTOMY, ADENOIDECTOMY, BILATERAL MYRINGOTOMY AND TUBES      WOUND DEBRIDEMENT Right 3/13/2025    Procedure: DEBRIDEMENT FOOT, RIGHT;  Surgeon: Jimenez Mchugh DPM;  Location: [facility] MAIN OR;   Service: Podiatry;  Laterality: Right;      General Information       Row Name 05/28/25 1136          OT Time and Intention    Subjective Information no complaints  -     Document Type therapy note (daily note)  -     Mode of Treatment co-treatment;occupational therapy  -     Patient Effort good  -     Symptoms Noted During/After Treatment none  -       Row Name 05/28/25 1136          General Information    Patient Profile Reviewed yes  -AJ     Existing Precautions/Restrictions fall  -AJ       Row Name 05/28/25 1136          Cognition    Orientation Status (Cognition) oriented x 3  -       Row Name 05/28/25 1136          Safety Issues/Impairments Affecting Functional Mobility    Impairments Affecting Function (Mobility) balance;endurance/activity tolerance;pain;postural/trunk control;range of motion (ROM);strength  -     Comment, Safety Issues/Impairments (Mobility) PT/OT cotreatment medically appropriate and necessary due to patient acuity level, to maximize therapeutic benefit due to impaired act tolerance, and for safety of patient and staff. Treatment focused on progression of care and goals established in POC.  -               User Key  (r) = Recorded By, (t) = Taken By, (c) = Cosigned By      Initials Name Provider Type    AJ Kae Locke OT Occupational Therapist                     Mobility/ADL's       Row Name 05/28/25 1139          Bed Mobility    Bed Mobility supine-sit;sit-supine  -     Supine-Sit [place] (Bed Mobility) moderate assist (50% patient effort);2 person assist;verbal cues;nonverbal cues (demo/gesture)  -AJ     Sit-Supine [place] (Bed Mobility) maximum assist (25% patient effort);2 person assist;verbal cues;nonverbal cues (demo/gesture)  -     Assistive Device (Bed Mobility) repositioning sheet;head of bed elevated  -       Row Name 05/28/25 1139          Functional Mobility    Patient was able to Ambulate no, other medical factors prevent ambulation  -AJ      Reason Patient was unable to Ambulate Excessive Weakness  -       Row Name 05/28/25 1139          Mobility    Left Upper Extremity (Weight-bearing Status) non weight-bearing (NWB)  -     Right Lower Extremity (Weight-bearing Status) non weight-bearing (NWB)  -[facility] Name 05/28/25 1139          Grooming Assessment/Training    [place] Level (Grooming) grooming skills;oral care regimen;set up  -     Comment, (Grooming) pt. was able to use tooth brush for grooming but would likely benefit from built up handle.  -               User Key  (r) = Recorded By, (t) = Taken By, (c) = Cosigned By      Initials Name Provider Type    Kae Ann OT Occupational Therapist                   Obj/Interventions       Row Name 05/28/25 1142          Range of Motion Comprehensive    General Range of Motion upper extremity range of motion deficits identified  -     Comment, General Range of Motion pt. with impaired AROM of LUE and demonstrating compensatory techniques to preform reaching. Pt. with ~25 degrees shoulder flexion. PROM of L shoulder WFLs  -[facility] Name 05/28/25 1142          Motor Skills    Motor Skills coordination  -     Coordination fine motor deficit  pt. with noted impairment in R hand when trying to grasp toothbrush. Pt. hand alomst appering as claw deformity. Pt. unabel to preform finger to thumb oppsoition  -[facility] Name 05/28/25 1142          Balance    Static Sitting Balance contact guard;verbal cues;non-verbal cues (demo/gesture)  -     Dynamic Sitting Balance contact guard;verbal cues;non-verbal cues (demo/gesture)  -     Comment, Balance pt. tolerated sitting EOB for ~15 min while participating in forward reaching with BUE as well as postural exercises. OT gave physcial cuing to promote scapular retraction to improve sitting balance on EOB. Pt. able to correct balance with intermittent verbal and physical cuing  -[facility] Name 05/28/25 1142          General Upper  Extremity Assessment (Range of Motion)    Upper Extremity: Range of Motion shoulder, left: UE ROM  -       Row Name 05/28/25 1142          Left Shoulder, Range of Motion    Left Shoulder, Range of Motion flexion  -       Row Name 05/28/25 1142          Flexion, Left Shoulder (Range of Motion)    AROM Deficit: Left Shoulder Flexion WFLs passive but unable to demo AROM  -               User Key  (r) = Recorded By, (t) = Taken By, (c) = Cosigned By      Initials Name Provider Type    Kae Ann OT Occupational Therapist                   Goals/Plan    No documentation.                  Clinical Impression       Row Name 05/28/25 1152          Pain Assessment    Pretreatment Pain Rating 0/10 - no pain  -       Row Name 05/28/25 1152          Plan of Care Review    Plan of Care Reviewed With patient  -     Progress improving  -     Outcome Evaluation Pt. agreable to skilled tx. this date and was able to demo goof progress with functional goals. Pt. was able to demo good sitting balance as well as improved tolerance to sitting EOB this date as pt. was sitting for ~15 min. Pt. continues to require significnat cuing for maintaing neutral sitting posture and adjusting back to neutral. Pt. reporeted that impairment with RUE is ~1 month old (unsure). Pt. would benefit from continued skileld tx. to improve (I) with self care.  -       Row Name 05/28/25 1152          Positioning and Restraints    Pre-Treatment Position in bed  -     Post Treatment Position bed  -AJ     In Bed notified nsg;call light within reach;encouraged to call for assist;exit alarm on  -               User Key  (r) = Recorded By, (t) = Taken By, (c) = Cosigned By      Initials Name Provider Type    Kae Ann OT Occupational Therapist                   Outcome Measures       Row Name 05/28/25 1155          How much help from another is currently needed...    Putting on and taking off regular lower body clothing? 2  -      Bathing (including washing, rinsing, and drying) 2  -AJ     Toileting (which includes using toilet bed pan or urinal) 2  -AJ     Putting on and taking off regular upper body clothing 2  -AJ     Taking care of personal grooming (such as brushing teeth) 2  -AJ     Eating meals 3  -AJ     AM-PAC 6 Clicks Score (OT) 13  -AJ       Row Name 05/28/25 1135 05/28/25 0800       How much help from another person do you currently need...    Turning from your back to your side while in flat bed without using bedrails? 2  -ST 2  -PB    Moving from lying on back to sitting on the side of a flat bed without bedrails? 2  -ST 2  -PB    Moving to and from a bed to a chair (including a wheelchair)? 1  -ST 1  -PB    Standing up from a chair using your arms (e.g., wheelchair, bedside chair)? 1  -ST 1  -PB    Climbing 3-5 steps with a railing? 1  -ST 1  -PB    To walk in hospital room? 1  -ST 1  -PB    AM-PAC 6 Clicks Score (PT) 8  -ST 8  -PB    Highest Level of Mobility Goal Sit at Edge of Bed-3  -ST Sit at Edge of Bed-3  -PB      Row Name 05/28/25 1155 05/28/25 1135       Functional Assessment    Outcome Measure Options AM-PAC 6 Clicks Daily Activity (OT)  - AM-PAC 6 Clicks Basic Mobility (PT)  -ST              User Key  (r) = Recorded By, (t) = Taken By, (c) = Cosigned By      Initials Name Provider Type    Migdalia Avina RN Registered Nurse    Gertrudis Pepper, PT Physical Therapist    Kae Ann, OT Occupational Therapist                      OT Recommendation and Plan     Plan of Care Review  Plan of Care Reviewed With: patient  Progress: improving  Outcome Evaluation: Pt. agreable to skilled tx. this date and was able to demo goof progress with functional goals. Pt. was able to demo good sitting balance as well as improved tolerance to sitting EOB this date as pt. was sitting for ~15 min. Pt. continues to require significnat cuing for maintaing neutral sitting posture and adjusting back to neutral. Pt. reporeted  that impairment with RUE is ~1 month old (unsure). Pt. would benefit from continued skileld tx. to improve (I) with self care.     Time Calculation:         Time Calculation- OT       Row Name 05/28/25 1156             Time Calculation- OT    OT Start Time 0942  -AJ      OT Stop Time 1008  -      OT Time Calculation (min) 26 min  -AJ      Total Timed Code Minutes- OT 25 minute(s)  -AJ      OT Received On 05/28/25  -      OT - Next Appointment 05/30/25  -         Timed Charges    54196 - OT Therapeutic Activity Minutes 15  -AJ      81730 - OT Self Care/Mgmt Minutes 10  -AJ         Total Minutes    Timed Charges Total Minutes 25  -       Total Minutes 25  -                User Key  (r) = Recorded By, (t) = Taken By, (c) = Cosigned By      Initials Name Provider Type    AJ Kae Locke OT Occupational Therapist                  Therapy Charges for Today       Code Description Service Date Service Provider Modifiers Qty    18143404870 HC OT THERAPEUTIC ACT EA 15 MIN 5/28/2025 Kae Locke OT GO 1    74099639338 HC OT SELF CARE/MGMT/TRAIN EA 15 MIN 5/28/2025 Kae Locke OT GO 1                 Kae Locke OT  5/28/2025

## 2025-05-28 NOTE — PROGRESS NOTES
Name: Rock Maciel Jr. ADMIT: 2025   : 1945  PCP: Denise Dickson APRN    MRN: [id number] LOS: 7 days   AGE/SEX: 79 y.o. male  ROOM: [facility]     Subjective   Subjective   Chief Complaint   Patient presents with    Abnormal Lab     No chest pain shortness of breath nausea vomiting or abdominal pain.     Objective   Objective   Vital Signs  Temp:  [97.7 °F (36.5 °C)-97.9 °F (36.6 °C)] 97.9 °F (36.6 °C)  Heart Rate:  [65-70] 70  Resp:  [16] 16  BP: (126-153)/(49-52) 153/52  SpO2:  [97 %-100 %] 97 %  on  Flow (L/min) (Oxygen Therapy):  [2] 2;   Device (Oxygen Therapy): nasal cannula  Body mass index is 35.25 kg/m².    Physical Exam  Vitals and nursing note reviewed.   Constitutional:       General: He is not in acute distress.     Appearance: He is not diaphoretic.   Cardiovascular:      Rate and Rhythm: Normal rate and regular rhythm.   Pulmonary:      Effort: Pulmonary effort is normal.      Breath sounds: Decreased breath sounds present. No wheezing.   Abdominal:      Palpations: Abdomen is soft.      Tenderness: There is no abdominal tenderness.   Musculoskeletal:      Right lower leg: Edema present.      Left lower leg: Edema present.      Comments: trace   Skin:     General: Skin is dry.   Neurological:      Mental Status: He is alert. Mental status is at baseline.   Psychiatric:         Mood and Affect: Mood normal.         Behavior: Behavior normal.       Results Review  I reviewed the patient's new clinical results.    Results from last 7 days   Lab Units 25  0625  0656 25  0540 25  0510   WBC 10*3/mm3 7.01 8.02 9.58 13.85*   HEMOGLOBIN g/dL 8.6* 8.2* 8.8* 10.1*   PLATELETS 10*3/mm3 191 222 219 229     Results from last 7 days   Lab Units 25  0628 25  0656 25  0540 25  0510   SODIUM mmol/L 134* 135* 137 136   POTASSIUM mmol/L 4.0 4.6 4.2 4.5   CHLORIDE mmol/L 100 103 103 104   CO2 mmol/L 24.3 24.0 24.4 25.9   BUN mg/dL 23.0 39* 32* 24*    CREATININE mg/dL 2.60* 4.39* 3.64* 2.74*   GLUCOSE mg/dL 82 78 70 69   EGFR mL/min/1.73 24.2* 12.9* 16.1* 22.7*     Results from last 7 days   Lab Units 05/28/25  0628 05/26/25  0540 05/25/25  0510 05/24/25  0749 05/23/25  0533   ALBUMIN g/dL 2.7* 2.4* 2.7* 2.8* 2.9*   BILIRUBIN mg/dL  --  0.2 0.3 0.3 0.4   ALK PHOS U/L  --  89 91 92 96   AST (SGOT) U/L  --  13 14 12 13   ALT (SGPT) U/L  --  10 11 10 10     Results from last 7 days   Lab Units 05/28/25  0628 05/27/25  0656 05/26/25  0540 05/25/25  0510 05/24/25  0749 05/23/25  0533 05/22/25  0535 05/22/25  0535   CALCIUM mg/dL 8.2* 8.3* 8.4* 8.6 8.4* 8.3*  --  8.2*   ALBUMIN g/dL 2.7*  --  2.4* 2.7* 2.8* 2.9*  --  2.6*   MAGNESIUM mg/dL 2.0  --   --   --   --  2.0  --  2.0   PHOSPHORUS mg/dL 3.1 4.6* 4.0 3.6 4.5 3.7   < >  --     < > = values in this interval not displayed.         Hemoglobin A1C   Date/Time Value Ref Range Status   05/28/2025 0628 5.20 4.80 - 5.60 % Final     Glucose   Date/Time Value Ref Range Status   05/28/2025 0655 89 70 - 130 mg/dL Final   05/27/2025 2048 177 (H) 70 - 130 mg/dL Final   05/27/2025 1530 135 (H) 70 - 130 mg/dL Final   05/27/2025 1039 184 (H) 70 - 130 mg/dL Final   05/27/2025 0604 88 70 - 130 mg/dL Final   05/26/2025 2038 168 (H) 70 - 130 mg/dL Final   05/26/2025 1535 132 (H) 70 - 130 mg/dL Final       No radiology results for the last day    I have personally reviewed all medications:  Scheduled Medications  amiodarone, 200 mg, Oral, Daily  [Held by provider] apixaban, 2.5 mg, Oral, BID  vitamin C, 250 mg, Oral, Daily  atorvastatin, 20 mg, Oral, Nightly  bumetanide, 2 mg, Oral, TID  cetaphil, , Topical, Q12H  clobetasol propionate, 1 Application, Topical, Q12H  [Held by provider] clopidogrel, 75 mg, Oral, Daily  epoetin vince/vince-epbx, 10,000 Units, Subcutaneous, Once per day on Monday Wednesday Friday  [Held by provider] glipizide, 2.5 mg, Oral, BID AC  hydrALAZINE, 10 mg, Oral, TID  insulin lispro, 2-7 Units, Subcutaneous, 4x  Daily AC & at Bedtime  [Held by provider] linagliptin, 5 mg, Oral, Daily  Menthol-Zinc Oxide, 1 Application, Topical, BID  metoprolol succinate XL, 25 mg, Oral, Daily  miconazole, 1 Application, Topical, Q12H  pantoprazole, 40 mg, Oral, BID AC  sevelamer, 800 mg, Oral, TID With Meals  sodium chloride, 10 mL, Intravenous, Q12H  sodium chloride, 10 mL, Intravenous, Q12H  sodium chloride, 10 mL, Intravenous, Q12H  sodium hypochlorite, , Topical, BID  tamsulosin, 0.4 mg, Oral, Daily  vancomycin, 125 mg, Oral, Q6H      Infusions  Pharmacy Consult,       Diet  Diet: Regular/House, Cardiac, Diabetic; Healthy Heart (2-3 Na+); Consistent Carbohydrate; Fluid Consistency: Thin (IDDSI 0)       Assessment/Plan     Active Hospital Problems    Diagnosis  POA    **Anemia [D64.9]  Yes    Moderate protein-calorie malnutrition [E44.0]  Yes    GI bleed [K92.2]  Yes    Peritoneal dialysis status [Z99.2]  Not Applicable    Hypoalbuminemia [E88.09]  Yes    Acute blood loss anemia [D62]  Unknown    Chronic heart failure with preserved ejection fraction (HFpEF) [I50.32]  Yes    Unsteady gait when walking [R26.81]  Yes    Nocturnal hypoxemia [G47.34]  Yes    CKD (chronic kidney disease) stage 4, GFR 15-29 ml/min [N18.4]  Yes    Persistent atrial fibrillation [I48.19]  Yes    Atrial fibrillation, persistent [I48.19]  Yes    Amputated toe of left foot [S98.132A]  Yes    S/P CABG x 2 [Z95.1]  Not Applicable    Amputated toe of right foot [S98.131A]  Yes    H/O aortic valve replacement with porcine valve [Z95.3]  Not Applicable    Type 2 diabetes mellitus with circulatory disorder, without long-term current use of insulin [E11.59]  Yes    SHASTA (obstructive sleep apnea) [G47.33]  Yes    Essential hypertension [I10]  Yes      Resolved Hospital Problems   No resolved problems to display.       79 y.o. male admitted with Anemia.    Gross hematuria: Underwent CBI and antiplatelet/anticoagulation held.  Status post cystoscopy with TURBT 5/24.  2 areas  were concerning for bleeding and body habitus also affected his procedure.  Pathology pending.  Undergoing voiding trial and had borderline elevated residual.  Potential resumption of Plavix/Eliquis after the voiding trial.  Urology following.  Heme positive stool: Status post EGD.  No acute bleeding was noted.  GI evaluated.  CKD 5/ESRD: Requiring HD.  Nephrology following.  C. difficile diarrhea: On vancomycin to complete course.  Infectious disease evaluated.  Chronic A-fib/chronic diastolic heart failure/history porcine AVR/history of CABG: Adjust per nephrology needs.  Eliquis as above when able.  Hypertension: Monitoring  Diabetes: A1c 5.2.  SSI.  Tradjenta held currently.  Glipizide stopped.  PPX: See above  Disposition: SNF/few more days anticipated    Expected Discharge Date: 5/29/2025; Expected Discharge Time:      Raheel Smith MD  [facility]  05/28/25  09:50 EDT    Dictated portions of note using Dragon dictation software.  Copied text in this note has been reviewed by me and remains accurate as of 05/28/25

## 2025-05-28 NOTE — CASE MANAGEMENT/SOCIAL WORK
Continued Stay Note  [facility]     Patient Name: Rock Maciel Jr.  MRN: [id number]  Today's Date: 5/28/2025    Admit Date: 5/21/2025    Plan: Plan [facility] for skilled care- pre cert needed.   MARIANNE Solis RN   Discharge Plan       Row Name 05/28/25 0582       Plan    Plan Plan [facility] for skilled care- pre cert needed.   MARIANNE Solis RN    Plan Comments Per Tabitha ([phone]) no [facility] facility can accept pt.  Mirella ( [phone]) is following for [facility].   Per Mirella pt needs a Hep B Surface Antibody and Chest X ray for [facility] to accept him for HD.  Discussed with MD and ordered.  Plan [facility] for skilled care- pre cert needed. MARIANNE Solis RN                   Discharge Codes    No documentation.                 Expected Discharge Date and Time       Expected Discharge Date Expected Discharge Time    May 29, 2025               Emily Solis RN

## 2025-05-28 NOTE — PROGRESS NOTES
"  Infectious Diseases Progress Note    Nicolasa Denny MD     [facility]  [place]: 7 days  Patient Identification:  Name: Rock Maciel Jr.  Age: 79 y.o.  Sex: male  :  1945  MRN: [id number]         Primary Care Physician: Denise Dickson, ZEINA        Subjective: No new complaints.  Interval History: See consultation note.    Objective:    Scheduled Meds:amiodarone, 200 mg, Oral, Daily  [Held by provider] apixaban, 2.5 mg, Oral, BID  vitamin C, 250 mg, Oral, Daily  atorvastatin, 20 mg, Oral, Nightly  bumetanide, 2 mg, Oral, TID  cetaphil, , Topical, Q12H  clobetasol propionate, 1 Application, Topical, Q12H  [Held by provider] clopidogrel, 75 mg, Oral, Daily  epoetin vince/vince-epbx, 10,000 Units, Subcutaneous, Once per day on   [Held by provider] glipizide, 2.5 mg, Oral, BID AC  hydrALAZINE, 10 mg, Oral, TID  insulin lispro, 2-7 Units, Subcutaneous, 4x Daily AC & at Bedtime  [Held by provider] linagliptin, 5 mg, Oral, Daily  Menthol-Zinc Oxide, 1 Application, Topical, BID  metoprolol succinate XL, 25 mg, Oral, Daily  miconazole, 1 Application, Topical, Q12H  pantoprazole, 40 mg, Oral, BID AC  sevelamer, 800 mg, Oral, TID With Meals  sodium chloride, 10 mL, Intravenous, Q12H  sodium chloride, 10 mL, Intravenous, Q12H  sodium chloride, 10 mL, Intravenous, Q12H  sodium hypochlorite, , Topical, BID  tamsulosin, 0.4 mg, Oral, Daily  vancomycin, 125 mg, Oral, Q6H      Continuous Infusions:Pharmacy Consult,         Vital signs in last 24 hours:  Temp:  [97.7 °F (36.5 °C)-97.9 °F (36.6 °C)] 97.9 °F (36.6 °C)  Heart Rate:  [65-70] 70  Resp:  [16] 16  BP: (126-153)/(49-52) 153/52    Intake/Output:    Intake/Output Summary (Last 24 hours) at 2025 0822  Last data filed at 2025 1741  Gross per 24 hour   Intake 250 ml   Output 1500 ml   Net -1250 ml       Exam:  /52   Pulse 70   Temp 97.9 °F (36.6 °C) (Oral)   Resp 16   Ht 182.9 cm (72\")   Wt 118 kg (259 lb 14.8 oz)  "  SpO2 97%   BMI 35.25 kg/m²   Patient is examined using the personal protective equipment as per guidelines from infection control for this particular patient as enacted.  Hand washing was performed before and after patient interaction.  General Appearance:  Alert and oriented x 3                          Head:    Normocephalic, without obvious abnormality, atraumatic                           Eyes:    PERRL, conjunctivae/corneas clear, EOM's intact, both eyes                         Throat:   Lips, tongue, gums normal; oral mucosa pink and moist                           Neck:   Supple, symmetrical, trachea midline, no JVD                         Lungs:    Clear to auscultation bilaterally, respirations unlabored                 Chest Wall:    No tenderness or deformity                          Heart:  S1-S2 regular                  Abdomen:   Soft nontender three-way catheter in place                 Extremities: No surrounding inflammation.                              Skin: Skin changes due to decubital process noted.                  Neurologic: Alert and oriented x 3       Data Review:    I reviewed the patient's new clinical results.  Results from last 7 days   Lab Units 05/28/25  0628 05/27/25  0656 05/26/25  0540 05/25/25  0510 05/24/25  0749 05/23/25  0533 05/22/25  1604 05/22/25  0535   WBC 10*3/mm3 7.01 8.02 9.58 13.85* 7.68 6.04  --  5.56   HEMOGLOBIN g/dL 8.6* 8.2* 8.8* 10.1* 8.9* 9.1* 7.6* 6.6*   PLATELETS 10*3/mm3 191 222 219 229 208 222  --  223     Results from last 7 days   Lab Units 05/28/25  0628 05/27/25  0656 05/26/25  0540 05/25/25  0510 05/24/25  0749 05/23/25  0533 05/22/25  0535   SODIUM mmol/L 134* 135* 137 136 135* 134* 135*   POTASSIUM mmol/L 4.0 4.6 4.2 4.5 4.3 4.2 3.5   CHLORIDE mmol/L 100 103 103 104 99 98 96*   CO2 mmol/L 24.3 24.0 24.4 25.9 27.9 28.0 30.0*   BUN mg/dL 23.0 39* 32* 24* 30* 21 30*   CREATININE mg/dL 2.60* 4.39* 3.64* 2.74* 3.22* 2.63* 3.60*   CALCIUM mg/dL 8.2*  8.3* 8.4* 8.6 8.4* 8.3* 8.2*   GLUCOSE mg/dL 82 78 70 69 107* 109* 50*     Microbiology Results (last 10 days)       Procedure Component Value - Date/Time    Clostridioides difficile Toxin - Stool, Per Rectum [[id number]]  (Abnormal) Collected: 05/25/25 0427    Lab Status: Final result Specimen: Stool from Per Rectum Updated: 05/25/25 0722    Narrative:      The following orders were created for panel order Clostridioides difficile Toxin - Stool, Per Rectum.  Procedure                               Abnormality         Status                     ---------                               -----------         ------                     Clostridioides difficile...[[id number]]  Abnormal            Final result                 Please view results for these tests on the individual orders.    Clostridioides difficile Toxin, PCR - Stool, Per Rectum [[id number]]  (Abnormal) Collected: 05/25/25 0427    Lab Status: Final result Specimen: Stool from Per Rectum Updated: 05/25/25 0722     Toxigenic C. difficile by PCR Positive    Narrative:      DNA from a toxigenic strain of C.difficile has been detected. Antigen testing for the presence of free C.difficile toxin is currently in progress, to help determine the clinical significance of this PCR result.     Clostridioides difficile toxin Ag, Reflex - Stool, Per Rectum [[id number]]  (Normal) Collected: 05/25/25 0427    Lab Status: Final result Specimen: Stool from Per Rectum Updated: 05/25/25 0852     C.diff Toxin Ag Negative    Narrative:      DNA from a toxigenic strain of C.difficile was detected, although the free toxin itself was not detected. These findings are consistent with C.difficile colonization and may not reflect actual C.difficile infection. Clinical correlation needed.    Urine Culture - Urine, Urine, Random Void [[id number]]  (Normal) Collected: 05/22/25 1035    Lab Status: Final result Specimen: Urine, Random Void Updated: 05/23/25 1101     Urine Culture No growth     CANDIDA AURIS PCR - Swab, Axilla Right, Axilla Left and Groin [[id number]]  (Normal) Collected: 05/21/25 1812    Lab Status: Final result Specimen: Swab from Axilla Right, Axilla Left and Groin Updated: 05/23/25 1205     JUDI AURIS PCR Not Detected                Assessment:    Anemia    Essential hypertension    Type 2 diabetes mellitus with circulatory disorder, without long-term current use of insulin    SHASTA (obstructive sleep apnea)    H/O aortic valve replacement with porcine valve    Amputated toe of right foot    S/P CABG x 2    Amputated toe of left foot    Atrial fibrillation, persistent    Persistent atrial fibrillation    CKD (chronic kidney disease) stage 4, GFR 15-29 ml/min    Nocturnal hypoxemia    Unsteady gait when walking    Chronic heart failure with preserved ejection fraction (HFpEF)    GI bleed    Peritoneal dialysis status    Hypoalbuminemia    Moderate protein-calorie malnutrition    Acute blood loss anemia    79-year-old male with  1-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  Patient currently seems to be stable and does not have evidence of active right foot infection or systemic symptoms.  He does have open wound on the right foot that would require care.  2-severe anemia multifactorial management per primary team  3-immobility with evolving decubitus skin changes  4-end-stage renal disease on dialysis per nephrology service via right IJ tunnel catheter  5-immobilization syndrome #6-diabetes  7-peripheral vascular disease  8-C. difficile diarrhea with colonization.        Recommendations/Discussions:  See my discussion and recommendations on 5/23/2025.  Continue with local wound care as  clinically there is no evidence of active infection of the foot.  Continue follow-up as per podiatry recommendations.  Supportive care and management of other issues per primary team.  Continue oral vancomycin for 10 days.  Avoid broad-spectrum antibiotic therapy if possible.  Nicolasa Denny MD  5/28/2025  08:22 EDT    Parts of this note may be an electronic transcription/translation of spoken language to printed text using the Dragon dictation system.

## 2025-05-28 NOTE — PLAN OF CARE
Goal Outcome Evaluation:      Patient resting in bed.Denies any pain.BP meds held this shift due to Low DBP.Remains on 2L of oxygen.Wound care completed.Anti-itch cream applied.Patient last bladder scan at 341 @6am, patient is wanting to try and void before cath.Plan of care ongoing

## 2025-05-28 NOTE — THERAPY TREATMENT NOTE
Patient Name: Rock Maciel Jr.  : 1945    MRN: [id number]                              Today's Date: 2025       Admit Date: 2025    Visit Dx:     ICD-10-CM ICD-9-CM   1. Acute blood loss anemia  D62 285.1   2. Rectal bleeding  K62.5 569.3   3. ESRD on hemodialysis  N18.6 585.6    Z99.2 V45.11   4. Gastrointestinal hemorrhage, unspecified gastrointestinal hemorrhage type  K92.2 578.9     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    Chronic venous insufficiency    Nonrheumatic aortic valve stenosis    Carotid stenosis, asymptomatic    Bradycardia    Hyperlipidemia    Bilateral carotid artery disease    Abnormal cardiovascular stress test    H/O aortic valve replacement with porcine valve    Charcot-Davida-Tooth disease-like deformity of foot    Amputated toe of right foot    Fall    Non-traumatic rhabdomyolysis    S/P CABG x 2    CKD (chronic kidney disease) stage 3, GFR 30-59 ml/min    Rupture of left quadriceps tendon    Constipation    KILLIAN (acute kidney injury)    Wound of right leg    Anemia    Chronic diastolic (congestive) heart failure    Amputated toe of left foot    Gas gangrene    Cellulitis of left lower limb    Acquired absence of left foot    Bilateral lower extremity edema    Stage 3b chronic kidney disease    History of pneumonia    Atelectasis of both lungs    Abnormal pleural fluid    Pleural thickening    Atrial fibrillation, persistent    Chronic anticoagulation    Persistent atrial fibrillation    Bladder wall thickening    Hematuria    CKD (chronic kidney disease) stage 4, GFR 15-29  ml/min    Hypoxemia    Nocturnal hypoxemia    Status post left inguinal hernia repair, follow-up exam    Weakness of both lower extremities    Unsteady gait when walking    Wound, open, arm, forearm, right, subsequent encounter    Traumatic rhabdomyolysis    Closed displaced fracture of left clavicle    Pneumonia due to infectious organism    ATN (acute tubular necrosis)    Contusion of left knee    Acute osteomyelitis of right foot    Diabetic foot infection    MRSA (methicillin resistant Staphylococcus aureus) infection    Acute kidney injury    Chronic heart failure with preserved ejection fraction (HFpEF)    GI bleed    Peritoneal dialysis status    Hypoalbuminemia    Moderate protein-calorie malnutrition    Acute blood loss anemia     Past Medical History:   Diagnosis Date    Acquired absence of left foot 02/19/2022    Acute osteomyelitis of right foot 03/07/2025    Allergic rhinitis     Amputated toe of left foot 02/12/2021    Amputated toe of right foot 04/11/2019    ATN (acute tubular necrosis) 03/05/2025    Atrial fibrillation, persistent 10/18/2024    Bladder wall thickening 12/01/2024    Bronchitis     Carotid stenosis, asymptomatic 01/30/2017    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  Added automatically from request for surgery [id number]      Gout     H/O aortic valve replacement with porcine valve 09/10/2018    Hiatal hernia     History of MRSA infection     RIGHT FOOT 2010    Hyperlipidemia     Hypertension     LAFB (left  anterior fascicular block) 05/17/2016    MRSA (methicillin resistant Staphylococcus aureus) infection 03/08/2025    Murmur     Neuropathic arthropathy 05/17/2016    Overview:   2015 IMO UPDATE  Overview:   2015 IMO UPDATE      Nocturnal hypoxemia 12/01/2024    Nonrheumatic aortic valve stenosis 01/30/2017    Persistent atrial fibrillation 11/07/2024    Pneumothorax on right     S/P CABG x 2 05/06/2019 July 2018      Sleep apnea     pt wears CPAP at night    Status post left inguinal hernia repair, follow-up exam 01/05/2025    Traumatic rhabdomyolysis 03/03/2025    Type 2 diabetes mellitus with circulatory disorder, without long-term current use of insulin 05/17/2016    Unsteady gait when walking 01/05/2025    Wellness examination 06/24/2015    Annual Wellness Visit     Past Surgical History:   Procedure Laterality Date    ARTERY SURGERY Bilateral     carotid    BONE EXCISION LEG Right 3/10/2025    Procedure: BONE EXCISION LOWER EXTERMITY, RIGHT;  Surgeon: Jimenez Mchugh DPM;  Location: [facility] MAIN OR;  Service: Podiatry;  Laterality: Right;    CARDIAC CATHETERIZATION N/A 7/20/2018    Procedure: Left Heart Cath;  Surgeon: Jo Toney MD;  Location:  [facility] CATH INVASIVE LOCATION;  Service: Cardiovascular    CARDIAC CATHETERIZATION N/A 7/20/2018    Procedure: Coronary angiography;  Surgeon: Jo Toney MD;  [facility];  Service: Cardiovascular    CAROTID ENDARTERECTOMY Bilateral     COLONOSCOPY  2008    CORONARY ARTERY BYPASS GRAFT WITH AORTIC VALVE REPAIR/REPLACEMENT N/A 7/23/2018    Procedure: INTRAOPERATIVE ALEX, MIDLINE STERNOTOMY, CORONARY ARTERY BYPASS GRAFTING X 3 USING ENDOSCOPICALLY HARVESTED LEFT GREATER SAPHENOUS VEIN,  AORTIC VALVE REPLACEMENT USING 25MM LOPEZ II ULTRA PORCINE VALVE, PRP;  Surgeon: Phong Posey MD;  Location: [facility] MAIN OR;  Service: Cardiothoracic    CYSTOSCOPY WITH CLOT EVACUATION N/A 5/24/2025    Procedure: CYSTOSCOPY WITH  CLOT EVACUATION, RESECTION OF BLADDER MASS AND PLACEMENT OF CONTINOUS BLADDER IRRIGATION CATHETER;  Surgeon: Prosper Cornejo MD;  Location: [facility] MAIN OR;  Service: Urology;  Laterality: N/A;    ENDOSCOPY N/A 5/25/2025    Procedure: ESOPHAGOGASTRODUODENOSCOPY with biopsy;  Surgeon: Darrian Webb MD;  Location: [facility] ENDOSCOPY;  Service: Gastroenterology;  Laterality: N/A;  congested gastropathy    FOOT SURGERY Right 2010    5th digit removal    FOOT SURGERY Left 2011    1 digit removed    INCISION AND DRAINAGE LEG Right 3/28/2020    Procedure: DEBRIDMENT OF RIGHT CALF;  Surgeon: Ross Pineda MD;  Location: [facility] MAIN OR;  Service: Vascular;  Laterality: Right;    INCISION AND DRAINAGE LEG Right 3/10/2025    Procedure: INCISION AND DRAINAGE LOWER EXTREMITY;  Surgeon: Jimenez Mchugh DPM;  Location: [facility] MAIN OR;  Service: Podiatry;  Laterality: Right;    INGUINAL HERNIA REPAIR Left 11/29/2024    Procedure: INGUINAL HERNIA REPAIR LAPAROSCOPIC WITH DAVINCI ROBOT;  Surgeon: Phong Lazo MD;  Location: [facility] MAIN OR;  Service: Robotics - DaVinci;  Laterality: Left;    INSERTION HEMODIALYSIS CATHETER N/A 3/11/2025    Procedure: HEMODIALYSIS CATHETER INSERTION;  Surgeon: Jeaneth Bonds MD;  Location: [facility] MAIN OR;  Service: Vascular;  Laterality: N/A;    QUADRICEPS TENDON REPAIR Left 5/14/2019    Procedure: Left QUADRICEPS TENDON REPAIR;  Surgeon: Camilo Hunter MD;  Location: [facility] MAIN OR;  Service: Orthopedics    THORACENTESIS Right 11/21/2016    THORACOSCOPY Right 5/8/2017    Procedure: BRONCHOSCOPY, RIGHT VAT,  TOTAL DECORTICATION RIGHT LUNG, PLEURAL BX, PLACEMENT SUBPLEURAL PAIN CAATHETERS X2;  Surgeon: Donald Orlando III, MD;  Location: [facility] MAIN OR;  Service:     TONSILECTOMY, ADENOIDECTOMY, BILATERAL MYRINGOTOMY AND TUBES      WOUND DEBRIDEMENT Right 3/13/2025    Procedure: DEBRIDEMENT FOOT, RIGHT;  Surgeon: Jimenez Mchugh DPM;  Location: [facility] MAIN OR;   Service: Podiatry;  Laterality: Right;      General Information       Row Name 05/28/25 1131          Physical Therapy Time and Intention    Document Type therapy note (daily note)  -     Mode of Treatment co-treatment;physical therapy;occupational therapy  two sets of skilled hands to safely and maximally progress mobility  -       Row Name 05/28/25 1131          General Information    Existing Precautions/Restrictions fall  NWB R LE, L UE. KI for L LE  -       Row Name 05/28/25 1131          Cognition    Orientation Status (Cognition) oriented x 3  -[facility] Name 05/28/25 1131          Safety Issues/Impairments Affecting Functional Mobility    Impairments Affecting Function (Mobility) balance;endurance/activity tolerance;pain;postural/trunk control;range of motion (ROM);strength  -ST     Comment, Safety Issues/Impairments (Mobility) nonskid socks donned  -               User Key  (r) = Recorded By, (t) = Taken By, (c) = Cosigned By      Initials Name Provider Type    Gertrudis Pepper PT Physical Therapist                   Mobility       Row Name 05/28/25 1132          Bed Mobility    Supine-Sit [place] (Bed Mobility) moderate assist (50% patient effort);2 person assist;verbal cues;nonverbal cues (demo/gesture)  -ST     Sit-Supine [place] (Bed Mobility) maximum assist (25% patient effort);2 person assist;verbal cues;nonverbal cues (demo/gesture)  -ST     Assistive Device (Bed Mobility) repositioning sheet;head of bed elevated  -ST     Comment, (Bed Mobility) increased time to complete  -       Row Name 05/28/25 1132          Mobility    Left Upper Extremity (Weight-bearing Status) non weight-bearing (NWB)  -ST     Right Lower Extremity (Weight-bearing Status) non weight-bearing (NWB)  -               User Key  (r) = Recorded By, (t) = Taken By, (c) = Cosigned By      Initials Name Provider Type    Gertrudis Pepper PT Physical Therapist                   Obj/Interventions        Row Name 05/28/25 1132          Motor Skills    Therapeutic Exercise --  seated LAQ, hip marches bilat 3 x 5  -ST       Row Name 05/28/25 1132          Balance    Comment, Balance SBA to CGA for unsupported sitting with intermittent cues for posture during various dynamic tasks. improved awareness to posture and positioning today and pt even starting to correct himself by end of session. frequent breaks needed for posture reset  -ST               User Key  (r) = Recorded By, (t) = Taken By, (c) = Cosigned By      Initials Name Provider Type    Gertrudis Pepper, PT Physical Therapist                   Goals/Plan    No documentation.                  Clinical Impression       Row Name 05/28/25 1133          Pain    Pretreatment Pain Rating 0/10 - no pain  -ST     Posttreatment Pain Rating 0/10 - no pain  -ST       Row Name 05/28/25 1133          Plan of Care Review    Plan of Care Reviewed With patient  -ST     Progress improving  -ST     Outcome Evaluation Pt seen for PT/OT this AM with improving acitivity tolerance - increased time sitting EOB today, up to 15 min varying from SBA to CGA for tactile cueing with posture. improving self awareness with education. pt noted to fatigue with posterior lean during dynamic tasks but less than yesterday. returned to supine in modified chair position for improved upright positioning. spoke with nsg regarding a new KI to progress to transfers as noted in ortho noted back in march for KI with OOB activity  -ST       Row Name 05/28/25 1133          Therapy Assessment/Plan (PT)    Rehab Potential (PT) fair  -ST     Criteria for Skilled Interventions Met (PT) yes  -ST     Therapy Frequency (PT) 5 times/wk  -ST       Row Name 05/28/25 1133          Positioning and Restraints    Pre-Treatment Position in bed  -ST     Post Treatment Position bed  -ST     In Bed supine;call light within reach;encouraged to call for assist;exit alarm on  -ST               User Key  (r) =  Recorded By, (t) = Taken By, (c) = Cosigned By      Initials Name Provider Type    Gertrudis Pepper PT Physical Therapist                   Outcome Measures       Row Name 05/28/25 1135 05/28/25 0800       How much help from another person do you currently need...    Turning from your back to your side while in flat bed without using bedrails? 2  -ST 2  -PB    Moving from lying on back to sitting on the side of a flat bed without bedrails? 2  -ST 2  -PB    Moving to and from a bed to a chair (including a wheelchair)? 1  -ST 1  -PB    Standing up from a chair using your arms (e.g., wheelchair, bedside chair)? 1  -ST 1  -PB    Climbing 3-5 steps with a railing? 1  -ST 1  -PB    To walk in hospital room? 1  -ST 1  -PB    AM-PAC 6 Clicks Score (PT) 8  -ST 8  -PB    Highest Level of Mobility Goal Sit at Edge of Bed-3  -ST Sit at Edge of Bed-3  -PB      Row Name 05/28/25 1135          Functional Assessment    Outcome Measure Options AM-PAC 6 Clicks Basic Mobility (PT)  -ST               User Key  (r) = Recorded By, (t) = Taken By, (c) = Cosigned By      Initials Name Provider Type    Migdalia Avina RN Registered Nurse    Gertrudis Pepper PT Physical Therapist                                 Physical Therapy Education       Title: PT OT SLP Therapies (Done)       Topic: Physical Therapy (Done)       Point: Mobility training (Done)       Learning Progress Summary            Patient Acceptance, E, NR,VU by  at 5/28/2025 1135    Acceptance, E, VU by NILE at 5/27/2025 1756    Acceptance, E, VU by NILE at 5/27/2025 1649    Acceptance, E,D, VU,NR by MS at 5/23/2025 1102                      Point: Home exercise program (Done)       Learning Progress Summary            Patient Acceptance, E, VU by NILE at 5/27/2025 1756    Acceptance, E, VU by NILE at 5/27/2025 1649    Acceptance, E,D, VU,NR by MS at 5/23/2025 1102                      Point: Body mechanics (Done)       Learning Progress Summary            Patient  Acceptance, E, VU by  at 5/27/2025 1756    Acceptance, E, VU by  at 5/27/2025 1649    Acceptance, E,D, VU,NR by MS at 5/23/2025 1102                      Point: Precautions (Done)       Learning Progress Summary            Patient Acceptance, E, VU by  at 5/27/2025 1756    Acceptance, E, VU by  at 5/27/2025 1649    Acceptance, E,D, VU,NR by MS at 5/23/2025 1102                                      User Key       Initials Effective Dates Name Provider Type Discipline    MS 06/16/21 -  Jd Kowalski, PT Physical Therapist PT     12/20/24 -  Tatyana Nice, RN Extern Registered Nurse Nurse     09/22/22 -  Gertrudis Hernandez PT Physical Therapist PT                  PT Recommendation and Plan     Progress: improving  Outcome Evaluation: Pt seen for PT/OT this AM with improving acitivity tolerance - increased time sitting EOB today, up to 15 min varying from SBA to CGA for tactile cueing with posture. improving self awareness with education. pt noted to fatigue with posterior lean during dynamic tasks but less than yesterday. returned to supine in modified chair position for improved upright positioning. spoke with nsg regarding a new KI to progress to transfers as noted in ortho noted back in march for KI with OOB activity     Time Calculation:         PT Charges       Row Name 05/28/25 1136             Time Calculation    Start Time 0942  -ST      Stop Time 1010  -ST      Time Calculation (min) 28 min  -ST      PT Received On 05/28/25  -ST      PT - Next Appointment 05/29/25  -ST         Time Calculation- PT    Total Timed Code Minutes- PT 28 minute(s)  -ST         Timed Charges    30368 - PT Therapeutic Exercise Minutes 10  -ST      97134 - PT Therapeutic Activity Minutes 18  -ST         Total Minutes    Timed Charges Total Minutes 28  -ST       Total Minutes 28  -ST                User Key  (r) = Recorded By, (t) = Taken By, (c) = Cosigned By      Initials Name Provider Type    ST Hernandez  Gertrudis, PT Physical Therapist                  Therapy Charges for Today       Code Description Service Date Service Provider Modifiers Qty    47220501806 HC PT THER PROC EA 15 MIN 5/27/2025 Gertrudis Hernandez, PT GP 1    78574701322 HC PT THERAPEUTIC ACT EA 15 MIN 5/27/2025 Gertrudis Hernandez, PT GP 1    03052907248 HC PT THER SUPP EA 15 MIN 5/27/2025 Gertrudis Hernandez, PT GP 2    53946581497 HC PT THER PROC EA 15 MIN 5/28/2025 Gertrudis Hernandez, PT GP 1    05403958546 HC PT THERAPEUTIC ACT EA 15 MIN 5/28/2025 Gertrudis Hernandez, PT GP 1            PT G-Codes  Outcome Measure Options: AM-PAC 6 Clicks Basic Mobility (PT)  AM-PAC 6 Clicks Score (PT): 8  AM-PAC 6 Clicks Score (OT): 13  PT Discharge Summary  Anticipated Discharge Disposition (PT): skilled nursing facility    Gertrudis Hernandez, PT  5/28/2025

## 2025-05-28 NOTE — PLAN OF CARE
Goal Outcome Evaluation:  Plan of Care Reviewed With: patient        Progress: improving  Outcome Evaluation: Pt seen for PT/OT this AM with improving acitivity tolerance - increased time sitting EOB today, up to 15 min varying from SBA to CGA for tactile cueing with posture. improving self awareness with education. pt noted to fatigue with posterior lean during dynamic tasks but less than yesterday. returned to supine in modified chair position for improved upright positioning. spoke with nsg regarding a new KI to progress to transfers as noted in ortho noted back in march for KI with OOB activity    Anticipated Discharge Disposition (PT): skilled nursing facility

## 2025-05-28 NOTE — PLAN OF CARE
Goal Outcome Evaluation:  Plan of Care Reviewed With: patient        Progress: improving  Outcome Evaluation: Pt AOx4. On 2L NC a little wheezy today- held bumex yesterday for HD and low BPs. NSR w/ 1st AVB and BBB. HD dressing C/D/I and dated ( was changed yesterday). Skin/wound change per orders- skin looking alot better. q2 turns. Retaining urine and using intermittent striaght cath per urology- pt tolerated better than last week- 14 [place] coude used. Urine is brown in color with sediment. Pt not in acute distress. Plan of care ongoing     No BM x3 days- colace ordered and mirilax prn. Plavix restarted today as well  Dressing to foot changed this evening and bladder scan pm was 280

## 2025-05-29 LAB
ALBUMIN SERPL-MCNC: 2.7 G/DL (ref 3.5–5.2)
ANION GAP SERPL CALCULATED.3IONS-SCNC: 8 MMOL/L (ref 5–15)
BASOPHILS # BLD AUTO: 0.02 10*3/MM3 (ref 0–0.2)
BASOPHILS NFR BLD AUTO: 0.3 % (ref 0–1.5)
BUN SERPL-MCNC: 28 MG/DL (ref 8–23)
BUN/CREAT SERPL: 8.8 (ref 7–25)
CALCIUM SPEC-SCNC: 8.4 MG/DL (ref 8.6–10.5)
CHLORIDE SERPL-SCNC: 102 MMOL/L (ref 98–107)
CO2 SERPL-SCNC: 24 MMOL/L (ref 22–29)
CREAT SERPL-MCNC: 3.2 MG/DL (ref 0.76–1.27)
CYTO UR: NORMAL
DEPRECATED RDW RBC AUTO: 54.4 FL (ref 37–54)
EGFRCR SERPLBLD CKD-EPI 2021: 18.8 ML/MIN/1.73
EOSINOPHIL # BLD AUTO: 0.44 10*3/MM3 (ref 0–0.4)
EOSINOPHIL NFR BLD AUTO: 6.1 % (ref 0.3–6.2)
ERYTHROCYTE [DISTWIDTH] IN BLOOD BY AUTOMATED COUNT: 15.4 % (ref 12.3–15.4)
GLUCOSE BLDC GLUCOMTR-MCNC: 104 MG/DL (ref 70–130)
GLUCOSE BLDC GLUCOMTR-MCNC: 136 MG/DL (ref 70–130)
GLUCOSE BLDC GLUCOMTR-MCNC: 159 MG/DL (ref 70–130)
GLUCOSE BLDC GLUCOMTR-MCNC: 182 MG/DL (ref 70–130)
GLUCOSE SERPL-MCNC: 104 MG/DL (ref 65–99)
HCT VFR BLD AUTO: 28.2 % (ref 37.5–51)
HGB BLD-MCNC: 9 G/DL (ref 13–17.7)
IMM GRANULOCYTES # BLD AUTO: 0.06 10*3/MM3 (ref 0–0.05)
IMM GRANULOCYTES NFR BLD AUTO: 0.8 % (ref 0–0.5)
LAB AP CASE REPORT: NORMAL
LAB AP DIAGNOSIS COMMENT: NORMAL
LYMPHOCYTES # BLD AUTO: 0.8 10*3/MM3 (ref 0.7–3.1)
LYMPHOCYTES NFR BLD AUTO: 11 % (ref 19.6–45.3)
MAGNESIUM SERPL-MCNC: 2 MG/DL (ref 1.6–2.4)
MCH RBC QN AUTO: 30.7 PG (ref 26.6–33)
MCHC RBC AUTO-ENTMCNC: 31.9 G/DL (ref 31.5–35.7)
MCV RBC AUTO: 96.2 FL (ref 79–97)
MONOCYTES # BLD AUTO: 0.66 10*3/MM3 (ref 0.1–0.9)
MONOCYTES NFR BLD AUTO: 9.1 % (ref 5–12)
NEUTROPHILS NFR BLD AUTO: 5.27 10*3/MM3 (ref 1.7–7)
NEUTROPHILS NFR BLD AUTO: 72.7 % (ref 42.7–76)
NRBC BLD AUTO-RTO: 0 /100 WBC (ref 0–0.2)
PATH REPORT.FINAL DX SPEC: NORMAL
PATH REPORT.GROSS SPEC: NORMAL
PHOSPHATE SERPL-MCNC: 3.2 MG/DL (ref 2.5–4.5)
PLATELET # BLD AUTO: 200 10*3/MM3 (ref 140–450)
PMV BLD AUTO: 9.2 FL (ref 6–12)
POTASSIUM SERPL-SCNC: 4.1 MMOL/L (ref 3.5–5.2)
RBC # BLD AUTO: 2.93 10*6/MM3 (ref 4.14–5.8)
SODIUM SERPL-SCNC: 134 MMOL/L (ref 136–145)
WBC NRBC COR # BLD AUTO: 7.25 10*3/MM3 (ref 3.4–10.8)

## 2025-05-29 PROCEDURE — 83735 ASSAY OF MAGNESIUM: CPT | Performed by: INTERNAL MEDICINE

## 2025-05-29 PROCEDURE — 25010000002 HEPARIN (PORCINE) PER 1000 UNITS: Performed by: STUDENT IN AN ORGANIZED HEALTH CARE EDUCATION/TRAINING PROGRAM

## 2025-05-29 PROCEDURE — 80069 RENAL FUNCTION PANEL: CPT | Performed by: INTERNAL MEDICINE

## 2025-05-29 PROCEDURE — 85025 COMPLETE CBC W/AUTO DIFF WBC: CPT | Performed by: UROLOGY

## 2025-05-29 PROCEDURE — 82948 REAGENT STRIP/BLOOD GLUCOSE: CPT

## 2025-05-29 RX ORDER — LOPERAMIDE HYDROCHLORIDE 2 MG/1
2 CAPSULE ORAL DAILY PRN
Status: DISCONTINUED | OUTPATIENT
Start: 2025-05-29 | End: 2025-05-29

## 2025-05-29 RX ORDER — PANTOPRAZOLE SODIUM 40 MG/1
40 TABLET, DELAYED RELEASE ORAL
Status: DISCONTINUED | OUTPATIENT
Start: 2025-05-30 | End: 2025-06-04 | Stop reason: HOSPADM

## 2025-05-29 RX ORDER — DOCUSATE SODIUM 100 MG/1
100 CAPSULE, LIQUID FILLED ORAL 2 TIMES DAILY PRN
Status: DISCONTINUED | OUTPATIENT
Start: 2025-05-29 | End: 2025-06-04 | Stop reason: HOSPADM

## 2025-05-29 RX ADMIN — CLOBETASOL PROPIONATE CREAM USP, 0.05% 1 APPLICATION: 0.5 CREAM TOPICAL at 09:12

## 2025-05-29 RX ADMIN — VANCOMYCIN HYDROCHLORIDE 125 MG: 125 CAPSULE ORAL at 12:15

## 2025-05-29 RX ADMIN — MENTHOL, ZINC OXIDE 1 APPLICATION: .44; 20.6 OINTMENT TOPICAL at 09:12

## 2025-05-29 RX ADMIN — CLOBETASOL PROPIONATE CREAM USP, 0.05% 1 APPLICATION: 0.5 CREAM TOPICAL at 22:19

## 2025-05-29 RX ADMIN — Medication 10 ML: at 22:19

## 2025-05-29 RX ADMIN — ANTI-FUNGAL POWDER MICONAZOLE NITRATE TALC FREE 1 APPLICATION: 1.42 POWDER TOPICAL at 09:12

## 2025-05-29 RX ADMIN — Medication: at 09:12

## 2025-05-29 RX ADMIN — ANTI-FUNGAL POWDER MICONAZOLE NITRATE TALC FREE 1 APPLICATION: 1.42 POWDER TOPICAL at 22:19

## 2025-05-29 RX ADMIN — VANCOMYCIN HYDROCHLORIDE 125 MG: 125 CAPSULE ORAL at 18:18

## 2025-05-29 RX ADMIN — SEVELAMER CARBONATE 800 MG: 800 TABLET, FILM COATED ORAL at 09:12

## 2025-05-29 RX ADMIN — SEVELAMER CARBONATE 800 MG: 800 TABLET, FILM COATED ORAL at 18:18

## 2025-05-29 RX ADMIN — Medication 10 ML: at 22:20

## 2025-05-29 RX ADMIN — OXYCODONE HYDROCHLORIDE AND ACETAMINOPHEN 250 MG: 500 TABLET ORAL at 09:11

## 2025-05-29 RX ADMIN — DAKIN'S SOLUTION 0.125% (QUARTER STRENGTH): 0.12 SOLUTION at 22:19

## 2025-05-29 RX ADMIN — VANCOMYCIN HYDROCHLORIDE 125 MG: 125 CAPSULE ORAL at 23:30

## 2025-05-29 RX ADMIN — VANCOMYCIN HYDROCHLORIDE 125 MG: 125 CAPSULE ORAL at 01:29

## 2025-05-29 RX ADMIN — HYDRALAZINE HYDROCHLORIDE 10 MG: 10 TABLET ORAL at 22:18

## 2025-05-29 RX ADMIN — AMIODARONE HYDROCHLORIDE 200 MG: 200 TABLET ORAL at 09:12

## 2025-05-29 RX ADMIN — ATORVASTATIN CALCIUM 20 MG: 20 TABLET, FILM COATED ORAL at 22:18

## 2025-05-29 RX ADMIN — PANTOPRAZOLE SODIUM 40 MG: 40 TABLET, DELAYED RELEASE ORAL at 08:02

## 2025-05-29 RX ADMIN — Medication: at 22:18

## 2025-05-29 RX ADMIN — BUMETANIDE 2 MG: 1 TABLET ORAL at 08:02

## 2025-05-29 RX ADMIN — CLOPIDOGREL BISULFATE 75 MG: 75 TABLET, FILM COATED ORAL at 09:12

## 2025-05-29 RX ADMIN — DAKIN'S SOLUTION 0.125% (QUARTER STRENGTH) 1 BOTTLE: 0.12 SOLUTION at 15:00

## 2025-05-29 RX ADMIN — HEPARIN SODIUM 3800 UNITS: 1000 INJECTION INTRAVENOUS; SUBCUTANEOUS at 15:41

## 2025-05-29 RX ADMIN — APIXABAN 2.5 MG: 2.5 TABLET, FILM COATED ORAL at 22:18

## 2025-05-29 RX ADMIN — VANCOMYCIN HYDROCHLORIDE 125 MG: 125 CAPSULE ORAL at 08:02

## 2025-05-29 RX ADMIN — MENTHOL, ZINC OXIDE 1 APPLICATION: .44; 20.6 OINTMENT TOPICAL at 22:18

## 2025-05-29 RX ADMIN — BUMETANIDE 2 MG: 1 TABLET ORAL at 18:18

## 2025-05-29 RX ADMIN — TAMSULOSIN HYDROCHLORIDE 0.4 MG: 0.4 CAPSULE ORAL at 09:12

## 2025-05-29 NOTE — PROGRESS NOTES
Name: Rock Maciel Jr. ADMIT: 2025   : 1945  PCP: Denise Dickson APRN    MRN: [id number] LOS: 8 days   AGE/SEX: 79 y.o. male  ROOM: [facility]     Subjective   Subjective   Chief Complaint   Patient presents with    Abnormal Lab     No chest pain shortness of breath nausea vomiting or abdominal pain.  He is passing flatus.     Objective   Objective   Vital Signs  Temp:  [97.7 °F (36.5 °C)-98.6 °F (37 °C)] 98.6 °F (37 °C)  Heart Rate:  [62-67] 67  Resp:  [16] 16  BP: (143-155)/(47-55) 155/55  SpO2:  [90 %-96 %] 90 %  on  Flow (L/min) (Oxygen Therapy):  [1.5-2] 1.5;   Device (Oxygen Therapy): nasal cannula  Body mass index is 35.13 kg/m².    Physical Exam  Vitals and nursing note reviewed.   Constitutional:       General: He is not in acute distress.     Appearance: He is not diaphoretic.   Cardiovascular:      Rate and Rhythm: Normal rate and regular rhythm.   Pulmonary:      Effort: Pulmonary effort is normal.      Breath sounds: Decreased breath sounds present. No wheezing.   Abdominal:      Palpations: Abdomen is soft.      Tenderness: There is no abdominal tenderness.   Musculoskeletal:      Right lower leg: Edema present.      Left lower leg: Edema present.      Comments: trace   Skin:     General: Skin is dry.   Neurological:      Mental Status: He is alert. Mental status is at baseline.   Psychiatric:         Mood and Affect: Mood normal.         Behavior: Behavior normal.       Results Review  I reviewed the patient's new clinical results.    Results from last 7 days   Lab Units 25  0551 25  0628 25  0656 25  0540   WBC 10*3/mm3 7.25 7.01 8.02 9.58   HEMOGLOBIN g/dL 9.0* 8.6* 8.2* 8.8*   PLATELETS 10*3/mm3 200 191 222 219     Results from last 7 days   Lab Units 25  0551 25  0628 25  0656 25  0540   SODIUM mmol/L 134* 134* 135* 137   POTASSIUM mmol/L 4.1 4.0 4.6 4.2   CHLORIDE mmol/L 102 100 103 103   CO2 mmol/L 24.0 24.3 24.0 24.4   BUN  mg/dL 28.0* 23.0 39* 32*   CREATININE mg/dL 3.20* 2.60* 4.39* 3.64*   GLUCOSE mg/dL 104* 82 78 70   EGFR mL/min/1.73 18.8* 24.2* 12.9* 16.1*     Results from last 7 days   Lab Units 05/29/25  0551 05/28/25  0628 05/26/25  0540 05/25/25  0510 05/24/25  0749 05/23/25  0533   ALBUMIN g/dL 2.7* 2.7* 2.4* 2.7* 2.8* 2.9*   BILIRUBIN mg/dL  --   --  0.2 0.3 0.3 0.4   ALK PHOS U/L  --   --  89 91 92 96   AST (SGOT) U/L  --   --  13 14 12 13   ALT (SGPT) U/L  --   --  10 11 10 10     Results from last 7 days   Lab Units 05/29/25  0551 05/28/25  0628 05/27/25  0656 05/26/25  0540 05/25/25  0510 05/24/25  0749 05/23/25  0533   CALCIUM mg/dL 8.4* 8.2* 8.3* 8.4* 8.6   < > 8.3*   ALBUMIN g/dL 2.7* 2.7*  --  2.4* 2.7*   < > 2.9*   MAGNESIUM mg/dL 2.0 2.0  --   --   --   --  2.0   PHOSPHORUS mg/dL 3.2 3.1 4.6* 4.0 3.6   < > 3.7    < > = values in this interval not displayed.         Hemoglobin A1C   Date/Time Value Ref Range Status   05/28/2025 0628 5.20 4.80 - 5.60 % Final     Glucose   Date/Time Value Ref Range Status   05/29/2025 1035 159 (H) 70 - 130 mg/dL Final   05/29/2025 0659 104 70 - 130 mg/dL Final   05/28/2025 2055 157 (H) 70 - 130 mg/dL Final   05/28/2025 1655 166 (H) 70 - 130 mg/dL Final   05/28/2025 1124 182 (H) 70 - 130 mg/dL Final   05/28/2025 0655 89 70 - 130 mg/dL Final   05/27/2025 2048 177 (H) 70 - 130 mg/dL Final       XR Chest 1 View  Result Date: 5/28/2025  No focal consolidation. No pleural effusion or pneumothorax. Likely skinfold overlying the left upper lobe with lung markings extending peripherally. Right IJ dialysis catheter with tip overlying the right atrium. Normal size cardiomediastinal silhouette with prosthetic aortic valve. Nondisplaced median sternotomy wires.  This report was finalized on 5/28/2025 1:48 PM by Dr. Jd Maurer M.D on Workstation: [id number]        I have personally reviewed all medications:  Scheduled Medications  amiodarone, 200 mg, Oral, Daily  [Held by provider] apixaban,  2.5 mg, Oral, BID  vitamin C, 250 mg, Oral, Daily  atorvastatin, 20 mg, Oral, Nightly  bumetanide, 2 mg, Oral, TID  cetaphil, , Topical, Q12H  clobetasol propionate, 1 Application, Topical, Q12H  clopidogrel, 75 mg, Oral, Daily  epoetin vince/vince-epbx, 10,000 Units, Subcutaneous, Once per day on Monday Wednesday Friday  hydrALAZINE, 10 mg, Oral, TID  insulin lispro, 2-7 Units, Subcutaneous, 4x Daily AC & at Bedtime  [Held by provider] linagliptin, 5 mg, Oral, Daily  Menthol-Zinc Oxide, 1 Application, Topical, BID  metoprolol succinate XL, 25 mg, Oral, Daily  miconazole, 1 Application, Topical, Q12H  [START ON 5/30/2025] pantoprazole, 40 mg, Oral, Q AM  sevelamer, 800 mg, Oral, TID With Meals  sodium chloride, 10 mL, Intravenous, Q12H  sodium chloride, 10 mL, Intravenous, Q12H  sodium chloride, 10 mL, Intravenous, Q12H  sodium hypochlorite, , Topical, BID  tamsulosin, 0.4 mg, Oral, Daily  vancomycin, 125 mg, Oral, Q6H      Infusions  Pharmacy Consult,       Diet  Diet: Regular/House, Cardiac, Diabetic; Healthy Heart (2-3 Na+); Consistent Carbohydrate; Fluid Consistency: Thin (IDDSI 0)       Assessment/Plan     Active Hospital Problems    Diagnosis  POA    **Anemia [D64.9]  Yes    Moderate protein-calorie malnutrition [E44.0]  Yes    GI bleed [K92.2]  Yes    Peritoneal dialysis status [Z99.2]  Not Applicable    Hypoalbuminemia [E88.09]  Yes    Acute blood loss anemia [D62]  Unknown    Chronic heart failure with preserved ejection fraction (HFpEF) [I50.32]  Yes    Unsteady gait when walking [R26.81]  Yes    Nocturnal hypoxemia [G47.34]  Yes    CKD (chronic kidney disease) stage 4, GFR 15-29 ml/min [N18.4]  Yes    Persistent atrial fibrillation [I48.19]  Yes    Atrial fibrillation, persistent [I48.19]  Yes    Amputated toe of left foot [S98.132A]  Yes    S/P CABG x 2 [Z95.1]  Not Applicable    Amputated toe of right foot [S98.131A]  Yes    H/O aortic valve replacement with porcine valve [Z95.3]  Not Applicable    Type 2  diabetes mellitus with circulatory disorder, without long-term current use of insulin [E11.59]  Yes    SHASTA (obstructive sleep apnea) [G47.33]  Yes    Essential hypertension [I10]  Yes      Resolved Hospital Problems   No resolved problems to display.       79 y.o. male admitted with Anemia.    Gross hematuria: Underwent CBI and antiplatelet/anticoagulation held.  Status post cystoscopy with TURBT 5/24.  2 areas were concerning for bleeding and body habitus also affected his procedure.  Pathology pending.  Underwent voiding trial and is requiring intermittent cath.  Plavix resumed without bleeding.  Will resume his Eliquis.   Urology following.  Heme positive stool: Status post EGD.  No acute bleeding was noted.  GI evaluated.  CKD 5/ESRD: Requiring HD.  Nephrology following.  C. difficile diarrhea: On vancomycin to complete course. Chronic intermittent constipation issues. Infectious disease evaluated.  Chronic A-fib/chronic diastolic heart failure/history porcine AVR/history of CABG: Adjust per nephrology needs.  Eliquis as above.  Hypertension: Monitoring  Diabetes: A1c 5.2.  SSI.  Tradjenta held currently.  Glipizide stopped.  PPX: See above  Disposition: SNF/possibly tomorrow    Discussed with Dr Webb    Expected Discharge Date: 5/31/2025; Expected Discharge Time:      Raheel Smith MD  [facility]  05/29/25  11:21 EDT    Dictated portions of note using Dragon dictation software.  Copied text in this note has been reviewed by me and remains accurate as of 05/29/25

## 2025-05-29 NOTE — PROGRESS NOTES
"  First Urology Progress Note    Chief Complaint: None    He is doing well.  He is required straight cath couple times.  Does not make much urine.  Again he is on dialysis.  No problems catheterizing him.    Review of Systems:    The following systems were reviewed and negative;  respiratory and cardiovascular          Vital Signs  /55 (BP Location: Left arm, Patient Position: Lying)   Pulse 67   Temp 98.6 °F (37 °C) (Oral)   Resp 16   Ht 182.9 cm (72\")   Wt 118 kg (259 lb 0.7 oz)   SpO2 90%   BMI 35.13 kg/m²     Physical Exam:     General Appearance:    Alert, cooperative, NAD   HEENT:    No trauma, pupils reactive, hearing intact   Back:     No CVA tenderness   Lungs:     Respirations unlabored, no wheezing    Heart:    RRR, intact peripheral pulses   Abdomen:   Soft benign   :  Genitalia normal   Extremities:   No edema, no deformity   Lymphatic:   No neck or groin LAD   Skin:   No bleeding, bruising or rashes   Neuro/Psych:   Orientation intact, mood/affect pleasant, no focal findings        Results Review:     I reviewed the patient's new clinical results.  Lab Results (last 24 hours)       Procedure Component Value Units Date/Time    POC Glucose Once [[id number]]  (Normal) Collected: 05/29/25 0659    Specimen: Blood Updated: 05/29/25 0700     Glucose 104 mg/dL     Renal Function Panel [[id number]]  (Abnormal) Collected: 05/29/25 0551    Specimen: Blood Updated: 05/29/25 0646     Glucose 104 mg/dL      BUN 28.0 mg/dL      Creatinine 3.20 mg/dL      Sodium 134 mmol/L      Potassium 4.1 mmol/L      Chloride 102 mmol/L      CO2 24.0 mmol/L      Calcium 8.4 mg/dL      Albumin 2.7 g/dL      Phosphorus 3.2 mg/dL      Anion Gap 8.0 mmol/L      BUN/Creatinine Ratio 8.8     eGFR 18.8 mL/min/1.73     Narrative:      GFR Categories in Chronic Kidney Disease (CKD)              GFR Category          GFR (mL/min/1.73)    Interpretation  G1                    90 or greater        Normal or high (1)  G2            "         60-89                Mild decrease (1)  G3a                   45-59                Mild to moderate decrease  G3b                   30-44                Moderate to severe decrease  G4                    15-29                Severe decrease  G5                    14 or less           Kidney failure    (1)In the absence of evidence of kidney disease, neither GFR category G1 or G2 fulfill the criteria for CKD.    eGFR calculation 2021 CKD-EPI creatinine equation, which does not include race as a factor    Magnesium [[id number]]  (Normal) Collected: 05/29/25 0551    Specimen: Blood Updated: 05/29/25 0646     Magnesium 2.0 mg/dL     CBC & Differential [[id number]]  (Abnormal) Collected: 05/29/25 0551    Specimen: Blood Updated: 05/29/25 0623    Narrative:      The following orders were created for panel order CBC & Differential.  Procedure                               Abnormality         Status                     ---------                               -----------         ------                     CBC Auto Differential[[id number]]        Abnormal            Final result                 Please view results for these tests on the individual orders.    CBC Auto Differential [[id number]]  (Abnormal) Collected: 05/29/25 0551    Specimen: Blood Updated: 05/29/25 0623     WBC 7.25 10*3/mm3      RBC 2.93 10*6/mm3      Hemoglobin 9.0 g/dL      Hematocrit 28.2 %      MCV 96.2 fL      MCH 30.7 pg      MCHC 31.9 g/dL      RDW 15.4 %      RDW-SD 54.4 fl      MPV 9.2 fL      Platelets 200 10*3/mm3      Neutrophil % 72.7 %      Lymphocyte % 11.0 %      Monocyte % 9.1 %      Eosinophil % 6.1 %      Basophil % 0.3 %      Immature Grans % 0.8 %      Neutrophils, Absolute 5.27 10*3/mm3      Lymphocytes, Absolute 0.80 10*3/mm3      Monocytes, Absolute 0.66 10*3/mm3      Eosinophils, Absolute 0.44 10*3/mm3      Basophils, Absolute 0.02 10*3/mm3      Immature Grans, Absolute 0.06 10*3/mm3      nRBC 0.0 /100 WBC     POC Glucose Once  [[id number]]  (Abnormal) Collected: 05/28/25 2055    Specimen: Blood Updated: 05/28/25 2056     Glucose 157 mg/dL     POC Glucose Once [[id number]]  (Abnormal) Collected: 05/28/25 1655    Specimen: Blood Updated: 05/28/25 1656     Glucose 166 mg/dL     Hepatitis B Surface Antibody [[id number]] Collected: 05/28/25 1258    Specimen: Blood Updated: 05/28/25 1350     Hep B S Ab Non-Reactive    Narrative:      Non-Reactive - Individual is considered to be not immune to infection with HBV.    Equivocal - Unable to determine if anti-HBs is present at levels consistent with immunity. The individual should be further assessed by associated risk factors and the use of additional diagnositc information, or another sample may be collected and tested.    Reactive - Anti-HBs concentration detected at >10 mIU/mL. Individual is considered to be immune to infection with HBV.      Results may be falsely decreased if patient taking Biotin.      POC Glucose Once [[id number]]  (Abnormal) Collected: 05/28/25 1124    Specimen: Blood Updated: 05/28/25 1126     Glucose 182 mg/dL     Tissue Pathology Exam [[id number]] Collected: 05/25/25 1051    Specimen: Tissue from Esophagus, Distal Updated: 05/28/25 1056     Case Report --     Surgical Pathology Report                         Case: [id number]                                Authorizing Provider:  Darrian Webb MD        Collected:           05/25/2025 10:51 AM        Ordering Location:     [facility]  Received:            05/27/2025 07:09 AM                               ENDO SUITES                                                                Pathologist:           Eze Posadas MD                                                        Specimen:    Esophagus, Distal                                                                         Final Diagnosis --     1.  Esophagus, distal, biopsy: Squamoglandular mucosa and submucosa with    A.  Moderate mixed but predominantly  "chronic lamina propria inflammation; no significant eosinophilia.    B.  No definitive well-developed goblet cell metaplasia identified by routine staining.   C.  No dysplasia nor malignancy.                 D.  No viral inclusions nor fungal organisms identified.       Gross Description --     1. Esophagus, Distal.  The specimen is received in formalin labeled with the patient's name and further designated 'distal esophagus biopsy' are multiple small fragments of gray-tan tissue. The specimen is submitted for embedding as received.         Microscopic Description --     Unless \"gross only\" is specified, the final diagnosis for each specimen is based on microscopic examination of tissue.      Hemoglobin A1c [[id number]]  (Normal) Collected: 05/28/25 0628    Specimen: Blood Updated: 05/28/25 0802     Hemoglobin A1C 5.20 %     Narrative:      Hemoglobin A1C Ranges:    Increased Risk for Diabetes  5.7% to 6.4%  Diabetes                     >= 6.5%  Diabetic Goal                < 7.0%          Imaging Results (Last 24 Hours)       Procedure Component Value Units Date/Time    XR Chest 1 View [[id number]] Collected: 05/28/25 1344     Updated: 05/28/25 1351    Narrative:      XR CHEST 1 VW-     INDICATION: Discharge placement     COMPARISON: Chest radiographs dating back to 9/16/2015       Impression:      No focal consolidation. No pleural effusion or pneumothorax.  Likely skinfold overlying the left upper lobe with lung markings  extending peripherally. Right IJ dialysis catheter with tip overlying  the right atrium. Normal size cardiomediastinal silhouette with  prosthetic aortic valve. Nondisplaced median sternotomy wires.     This report was finalized on 5/28/2025 1:48 PM by Dr. Jd Maurer M.D on Workstation: [id number]               Medication Review:   I have personally reviewed    Current Facility-Administered Medications:     acetaminophen (TYLENOL) tablet 650 mg, 650 mg, Oral, Q4H PRN, 650 mg at 05/22/25 0019 " **OR** acetaminophen (TYLENOL) 160 MG/5ML oral solution 650 mg, 650 mg, Oral, Q4H PRN **OR** acetaminophen (TYLENOL) suppository 650 mg, 650 mg, Rectal, Q4H PRN, Prosper Cornejo MD    amiodarone (PACERONE) tablet 200 mg, 200 mg, Oral, Daily, Prosper Cornejo MD, 200 mg at 05/28/25 0800    [Held by provider] apixaban (ELIQUIS) tablet 2.5 mg, 2.5 mg, Oral, BID, Maria Isabel Parisi MD    ascorbic acid (VITAMIN C) tablet 250 mg, 250 mg, Oral, Daily, Prosper Cornejo MD, 250 mg at 05/28/25 0800    atorvastatin (LIPITOR) tablet 20 mg, 20 mg, Oral, Nightly, Prosper Cornejo MD, 20 mg at 05/28/25 2206    bumetanide (BUMEX) tablet 2 mg, 2 mg, Oral, TID, Prosper Cornejo MD, 2 mg at 05/28/25 1821    calcium carbonate (TUMS) chewable tablet 500 mg (200 mg elemental), 2 tablet, Oral, BID PRN, Prosper Cornejo MD    camphor-menthol (SARNA) 0.5-0.5 % lotion, , Topical, 4x Daily PRN, Prosper Cornejo MD, Given at 05/28/25 2208    cetaphil lotion, , Topical, Q12H, Prosper Cornejo MD, Given at 05/28/25 2208    clobetasol propionate (TEMOVATE) 0.05 % cream 1 Application, 1 Application, Topical, Q12H, Prosper Cornejo MD, 1 Application at 05/28/25 2208    clopidogrel (PLAVIX) tablet 75 mg, 75 mg, Oral, Daily, Raheel Smith MD, 75 mg at 05/28/25 1821    dextrose (D50W) (25 g/50 mL) IV injection 25 g, 25 g, Intravenous, Q15 Min PRN, Prosper Cornejo MD    dextrose (GLUTOSE) oral gel 15 g, 15 g, Oral, Q15 Min PRN, Prosper Cornejo MD    docusate sodium (COLACE) capsule 100 mg, 100 mg, Oral, BID, Raheel Smith MD, 100 mg at 05/28/25 2206    epoetin vince-epbx (RETACRIT) injection 10,000 Units, 10,000 Units, Subcutaneous, Once per day on Monday Wednesday Friday, Prosper Cornejo MD, 10,000 Units at 05/28/25 0801    glucagon (GLUCAGEN) injection 1 mg, 1 mg, Intramuscular, Q15 Min PRN, Prosper Cornejo MD    heparin (porcine) injection 4,000 Units, 4,000 Units,  Intracatheter, PRN, Tiesha Mccrary MD, 4,000 Units at 05/27/25 1703    heparin injection 300 Units, 3 mL, Intravenous, Daily PRN, Prosper Cornejo MD    hydrALAZINE (APRESOLINE) tablet 10 mg, 10 mg, Oral, TID, Prosper Cornejo MD, 10 mg at 05/28/25 1505    hydrOXYzine (ATARAX) tablet 25 mg, 25 mg, Oral, TID PRN, Prosper Cornejo MD, 25 mg at 05/28/25 2213    insulin lispro (HUMALOG/ADMELOG) injection 2-7 Units, 2-7 Units, Subcutaneous, 4x Daily AC & at Bedtime, Prosper Cornejo MD, 2 Units at 05/28/25 1821    Lidocaine HCl gel (XYLOCAINE) urethral/mucosal syringe, , Urethral, PRN, Prosper Corenjo MD    [Held by provider] linagliptin (TRADJENTA) tablet 5 mg, 5 mg, Oral, Daily, Maria Isabel Parisi MD    melatonin tablet 5 mg, 5 mg, Oral, Nightly PRN, Prosper Cornejo MD, 5 mg at 05/26/25 2046    Menthol-Zinc Oxide 1 Application, 1 Application, Topical, BID, Prosper Cornejo MD, 1 Application at 05/28/25 2207    metoprolol succinate XL (TOPROL-XL) 24 hr tablet 25 mg, 25 mg, Oral, Daily, Prosper Cornejo MD, 25 mg at 05/28/25 0800    miconazole (MICOTIN) 2 % powder 1 Application, 1 Application, Topical, Q12H, Prosper Cornejo MD, 1 Application at 05/28/25 2206    nitroglycerin (NITROSTAT) SL tablet 0.4 mg, 0.4 mg, Sublingual, Q5 Min PRN, Prosper Cornejo MD    ondansetron ODT (ZOFRAN-ODT) disintegrating tablet 4 mg, 4 mg, Oral, Q6H PRN **OR** ondansetron (ZOFRAN) injection 4 mg, 4 mg, Intravenous, Q6H PRN, Prosper Cornejo MD    oxybutynin (DITROPAN) tablet 5 mg, 5 mg, Oral, TID PRN, Prosper Cornejo MD, 5 mg at 05/22/25 1141    oxyCODONE-acetaminophen (PERCOCET) 5-325 MG per tablet 1 tablet, 1 tablet, Oral, Q4H PRN, Prosper Cornejo MD, 1 tablet at 05/28/25 0800    pantoprazole (PROTONIX) EC tablet 40 mg, 40 mg, Oral, BID AC, Raheel Smith MD, 40 mg at 05/28/25 1821    Pharmacy Consult, , Not Applicable, Continuous PRN, Wesley Christianson,  MD    polyethylene glycol (MIRALAX) packet 17 g, 17 g, Oral, Daily PRN, Raheel Smith MD    sevelamer (RENVELA) tablet 800 mg, 800 mg, Oral, TID With Meals, Prosper Cornejo MD, 800 mg at 05/28/25 1821    sodium chloride 0.9 % flush 10 mL, 10 mL, Intravenous, PRN, Prosper Cornejo MD    sodium chloride 0.9 % flush 10 mL, 10 mL, Intravenous, Q12H, Prosper Cornejo MD, 10 mL at 05/28/25 2208    sodium chloride 0.9 % flush 10 mL, 10 mL, Intravenous, PRN, Prosper Cornejo MD    sodium chloride 0.9 % flush 10 mL, 10 mL, Intravenous, Q12H, Prosper Cornejo MD, 10 mL at 05/28/25 2208    sodium chloride 0.9 % flush 10 mL, 10 mL, Intravenous, Q12H, Prosper Cornejo MD, 10 mL at 05/28/25 2208    sodium chloride 0.9 % flush 10 mL, 10 mL, Intravenous, PRN, Prosper Cornejo MD    sodium chloride 0.9 % flush 20 mL, 20 mL, Intravenous, PRN, Prosper Cornejo MD    sodium chloride 0.9 % infusion 40 mL, 40 mL, Intravenous, PRN, Prosper Cornejo MD    sodium chloride 0.9 % infusion 40 mL, 40 mL, Intravenous, PRN, Prosper Cornejo MD    sodium chloride nasal spray 1 spray, 1 spray, Each Nare, Q30 Min PRMIKE, Prosper Cornejo MD, 1 spray at 05/27/25 0837    sodium hypochlorite (DAKIN'S 1/4 STRENGTH) 0.125 % topical solution 0.125% solution, , Topical, BID, Prosper Cornejo MD, Given at 05/28/25 2209    tamsulosin (FLOMAX) 24 hr capsule 0.4 mg, 0.4 mg, Oral, Daily, Prosper Cornejo MD, 0.4 mg at 05/28/25 0801    vancomycin (VANCOCIN) capsule 125 mg, 125 mg, Oral, Q6H, Wesley Christianson MD, 125 mg at 05/29/25 0129    Allergies:    Ceclor [cefaclor]    Assessment:    Active Problems:    Anemia    Essential hypertension    Type 2 diabetes mellitus with circulatory disorder, without long-term current use of insulin    SHASTA (obstructive sleep apnea)    H/O aortic valve replacement with porcine valve    Amputated toe of right foot    S/P CABG x 2    Amputated toe of left  foot    Atrial fibrillation, persistent    Persistent atrial fibrillation    CKD (chronic kidney disease) stage 4, GFR 15-29 ml/min    Nocturnal hypoxemia    Unsteady gait when walking    Chronic heart failure with preserved ejection fraction (HFpEF)    GI bleed    Peritoneal dialysis status    Hypoalbuminemia    Moderate protein-calorie malnutrition    Acute blood loss anemia       Gross hematuria with clot retention.  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  Straight cath as needed.  To rehab at any time.  They can do intermittent cath at rehab.      Prosper Cornejo MD    5/29/2025  08:02 EDT

## 2025-05-29 NOTE — PROGRESS NOTES
Infectious Diseases Progress Note    Nicolasa Denny MD     [facility]  Los: 8 days  Patient Identification:  Name: Rock Maciel Jr.  Age: 79 y.o.  Sex: male  :  1945  MRN: [id number]         Primary Care Physician: Denise Dickson, ZEINA        Subjective: Denies any complaints no new issues.  Denies any fever chills diarrhea.  Interval History: See consultation note.    Objective:    Scheduled Meds:amiodarone, 200 mg, Oral, Daily  [Held by provider] apixaban, 2.5 mg, Oral, BID  vitamin C, 250 mg, Oral, Daily  atorvastatin, 20 mg, Oral, Nightly  bumetanide, 2 mg, Oral, TID  cetaphil, , Topical, Q12H  clobetasol propionate, 1 Application, Topical, Q12H  clopidogrel, 75 mg, Oral, Daily  epoetin vince/vince-epbx, 10,000 Units, Subcutaneous, Once per day on   hydrALAZINE, 10 mg, Oral, TID  insulin lispro, 2-7 Units, Subcutaneous, 4x Daily AC & at Bedtime  [Held by provider] linagliptin, 5 mg, Oral, Daily  Menthol-Zinc Oxide, 1 Application, Topical, BID  metoprolol succinate XL, 25 mg, Oral, Daily  miconazole, 1 Application, Topical, Q12H  [START ON 2025] pantoprazole, 40 mg, Oral, Q AM  sevelamer, 800 mg, Oral, TID With Meals  sodium chloride, 10 mL, Intravenous, Q12H  sodium chloride, 10 mL, Intravenous, Q12H  sodium chloride, 10 mL, Intravenous, Q12H  sodium hypochlorite, , Topical, BID  tamsulosin, 0.4 mg, Oral, Daily  vancomycin, 125 mg, Oral, Q6H      Continuous Infusions:Pharmacy Consult,         Vital signs in last 24 hours:  Temp:  [97.7 °F (36.5 °C)-98.6 °F (37 °C)] 98.6 °F (37 °C)  Heart Rate:  [62-67] 67  Resp:  [16] 16  BP: (143-158)/(45-55) 155/55    Intake/Output:    Intake/Output Summary (Last 24 hours) at 2025 0807  Last data filed at 2025 0113  Gross per 24 hour   Intake 120 ml   Output 800 ml   Net -680 ml       Exam:  /55 (BP Location: Left arm, Patient Position: Lying)   Pulse 67   Temp 98.6 °F (37 °C) (Oral)   Resp 16   Ht  "182.9 cm (72\")   Wt 118 kg (259 lb 0.7 oz)   SpO2 90%   BMI 35.13 kg/m²   Patient is examined using the personal protective equipment as per guidelines from infection control for this particular patient as enacted.  Hand washing was performed before and after patient interaction.  General Appearance:  Alert and oriented x 3                          Head:    Normocephalic, without obvious abnormality, atraumatic                           Eyes:    PERRL, conjunctivae/corneas clear, EOM's intact, both eyes                         Throat:   Lips, tongue, gums normal; oral mucosa pink and moist                           Neck:   Supple, symmetrical, trachea midline, no JVD                         Lungs:    Clear to auscultation bilaterally, respirations unlabored                 Chest Wall:    No tenderness or deformity                          Heart:  S1-S2 regular                  Abdomen:   Soft nontender three-way catheter in place                 Extremities: No surrounding inflammation.                              Skin: Skin changes due to decubital process noted.                  Neurologic: Alert and oriented x 3       Data Review:    I reviewed the patient's new clinical results.  Results from last 7 days   Lab Units 05/29/25  0551 05/28/25  0628 05/27/25  0656 05/26/25  0540 05/25/25  0510 05/24/25  0749 05/23/25  0533   WBC 10*3/mm3 7.25 7.01 8.02 9.58 13.85* 7.68 6.04   HEMOGLOBIN g/dL 9.0* 8.6* 8.2* 8.8* 10.1* 8.9* 9.1*   PLATELETS 10*3/mm3 200 191 222 219 229 208 222     Results from last 7 days   Lab Units 05/29/25  0551 05/28/25  0628 05/27/25  0656 05/26/25  0540 05/25/25  0510 05/24/25  0749 05/23/25  0533   SODIUM mmol/L 134* 134* 135* 137 136 135* 134*   POTASSIUM mmol/L 4.1 4.0 4.6 4.2 4.5 4.3 4.2   CHLORIDE mmol/L 102 100 103 103 104 99 98   CO2 mmol/L 24.0 24.3 24.0 24.4 25.9 27.9 28.0   BUN mg/dL 28.0* 23.0 39* 32* 24* 30* 21   CREATININE mg/dL 3.20* 2.60* 4.39* 3.64* 2.74* 3.22* 2.63* "   CALCIUM mg/dL 8.4* 8.2* 8.3* 8.4* 8.6 8.4* 8.3*   GLUCOSE mg/dL 104* 82 78 70 69 107* 109*     Microbiology Results (last 10 days)       Procedure Component Value - Date/Time    Clostridioides difficile Toxin - Stool, Per Rectum [[id number]]  (Abnormal) Collected: 05/25/25 0427    Lab Status: Final result Specimen: Stool from Per Rectum Updated: 05/25/25 0722    Narrative:      The following orders were created for panel order Clostridioides difficile Toxin - Stool, Per Rectum.  Procedure                               Abnormality         Status                     ---------                               -----------         ------                     Clostridioides difficile...[[id number]]  Abnormal            Final result                 Please view results for these tests on the individual orders.    Clostridioides difficile Toxin, PCR - Stool, Per Rectum [[id number]]  (Abnormal) Collected: 05/25/25 0427    Lab Status: Final result Specimen: Stool from Per Rectum Updated: 05/25/25 0722     Toxigenic C. difficile by PCR Positive    Narrative:      DNA from a toxigenic strain of C.difficile has been detected. Antigen testing for the presence of free C.difficile toxin is currently in progress, to help determine the clinical significance of this PCR result.     Clostridioides difficile toxin Ag, Reflex - Stool, Per Rectum [[id number]]  (Normal) Collected: 05/25/25 0427    Lab Status: Final result Specimen: Stool from Per Rectum Updated: 05/25/25 0852     C.diff Toxin Ag Negative    Narrative:      DNA from a toxigenic strain of C.difficile was detected, although the free toxin itself was not detected. These findings are consistent with C.difficile colonization and may not reflect actual C.difficile infection. Clinical correlation needed.    Urine Culture - Urine, Urine, Random Void [[id number]]  (Normal) Collected: 05/22/25 1035    Lab Status: Final result Specimen: Urine, Random Void Updated: 05/23/25 1101     Urine  Culture No growth    CANDIDA AURIS PCR - Swab, Axilla Right, Axilla Left and Groin [[id number]]  (Normal) Collected: 05/21/25 1812    Lab Status: Final result Specimen: Swab from Axilla Right, Axilla Left and Groin Updated: 05/23/25 1205     JUDI AURIS PCR Not Detected                Assessment:    Anemia    Essential hypertension    Type 2 diabetes mellitus with circulatory disorder, without long-term current use of insulin    SHASTA (obstructive sleep apnea)    H/O aortic valve replacement with porcine valve    Amputated toe of right foot    S/P CABG x 2    Amputated toe of left foot    Atrial fibrillation, persistent    Persistent atrial fibrillation    CKD (chronic kidney disease) stage 4, GFR 15-29 ml/min    Nocturnal hypoxemia    Unsteady gait when walking    Chronic heart failure with preserved ejection fraction (HFpEF)    GI bleed    Peritoneal dialysis status    Hypoalbuminemia    Moderate protein-calorie malnutrition    Acute blood loss anemia    79-year-old male with  1-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  Patient currently seems to be stable and does not have evidence of active right foot infection or systemic symptoms.  He does have open wound on the right foot that would require care.  2-severe anemia multifactorial management per primary team  3-immobility with evolving decubitus skin changes  4-end-stage renal disease on dialysis per nephrology service via right IJ tunnel catheter  5-immobilization syndrome #6-diabetes  7-peripheral vascular disease  8-C. difficile diarrhea with colonization.        Recommendations/Discussions:  See my discussion and recommendations on 5/23/2025.  Continue with  local wound care as clinically there is no evidence of active infection of the foot.  Continue follow-up as per podiatry recommendations.  Supportive care and management of other issues per primary team.  Continue oral vancomycin for 10 days.  Avoid broad-spectrum antibiotic therapy if possible.  Nicolasa Denny MD  5/29/2025  08:07 EDT    Parts of this note may be an electronic transcription/translation of spoken language to printed text using the Dragon dictation system.

## 2025-05-29 NOTE — PROGRESS NOTES
[facility]     Progress Note    Patient Name: Rock Maciel Jr.  : 1945  MRN: [id number]  Primary Care Physician:  Denise Dickson, ZEINA  Date of admission: 2025    Subjective   Subjective     Chief Complaint: diarrhea    History of Present Illness  Patient Reports diarrhea ,     Review of Systems   Gastrointestinal:  Positive for diarrhea.   Musculoskeletal:  Positive for arthralgias.   Neurological:  Positive for tremors.       Objective   Objective     Vitals:   Temp:  [97.7 °F (36.5 °C)-98.6 °F (37 °C)] 98.6 °F (37 °C)  Heart Rate:  [62-67] 67  Resp:  [16] 16  BP: (143-158)/(45-55) 155/55  Flow (L/min) (Oxygen Therapy):  [1.5-2] 1.5    Physical Exam  HENT:      Right Ear: External ear normal.      Left Ear: External ear normal.      Mouth/Throat:      Pharynx: Oropharynx is clear.   Eyes:      Conjunctiva/sclera: Conjunctivae normal.   Cardiovascular:      Rate and Rhythm: Normal rate.      Pulses: Normal pulses.   Abdominal:      Palpations: Abdomen is soft.   Skin:     General: Skin is warm.   Neurological:      General: No focal deficit present.      Mental Status: He is alert.   Psychiatric:         Mood and Affect: Mood normal.          Result Review    Result Review:  I have personally reviewed the results from the time of this admission to 2025 08:03 EDT and agree with these findings:  []  Laboratory list / accordion  []  Microbiology  []  Radiology  []  EKG/Telemetry   []  Cardiology/Vascular   []  Pathology  []  Old records  []  Other:  Most notable findings include:       Assessment & Plan   Assessment / Plan     Brief Patient Summary:  Rock Maciel Jr. is a 79 y.o. male who   Reflux esophagitis  Clostridia difficile   Anemia of chronic disease  Heme positive stools secondary to esophagitis     Active Hospital Problems:  Active Hospital Problems    Diagnosis     **Anemia     Moderate protein-calorie malnutrition     GI bleed     Peritoneal dialysis status      Hypoalbuminemia     Acute blood loss anemia     Chronic heart failure with preserved ejection fraction (HFpEF)     Unsteady gait when walking     Nocturnal hypoxemia     CKD (chronic kidney disease) stage 4, GFR 15-29 ml/min     Persistent atrial fibrillation     Atrial fibrillation, persistent     Amputated toe of left foot     S/P CABG x 2     Amputated toe of right foot     H/O aortic valve replacement with porcine valve     Type 2 diabetes mellitus with circulatory disorder, without long-term current use of insulin     SHASTA (obstructive sleep apnea)     Essential hypertension      Plan: change pantoprazole to daily   Stop docusate  Adding banatrol to help with diarrhea   Tried to order questran, restricted could not order it  Low dose lopermide       VTE Prophylaxis:  Pharmacologic & mechanical VTE prophylaxis orders are present.        CODE STATUS:    Code Status (Patient has no pulse and is not breathing): CPR (Attempt to Resuscitate)  Medical Interventions (Patient has pulse or is breathing): Full    Disposition:  I expect patient to be discharged uncertain .    Darrian Webb MD

## 2025-05-29 NOTE — PLAN OF CARE
Goal Outcome Evaluation:      Patient resting in bed.Denies any pain.Oxygen down to 1.5L.Medicated per MAR.PO Vanco.No BM this shift.Bladder scanned and straight cath this shift.Turned in bed,Plan of care ongoing

## 2025-05-29 NOTE — PROGRESS NOTES
"      FOLLOW UP NOTE      Name: Rock Maciel Jr. ADMIT: 2025   : 1945  PCP: Denise Dickson APRN    MRN: [id number] LOS: 8 days   AGE/SEX: 79 y.o. male  ROOM: [facility]     Date of Service: 2025                           CHIEF COMPLIANTS / REASON FOR FOLLOW UP          KILLIAN/CKD NXT stage 5 days/week    Subjective:        Reported some wheezing yesterday.  Improved today.  Resting in bed.  Continues to require CIC.    Review of Systems:       Negative except as above       OBJECTIVE                                                                        Exam:  /55 (BP Location: Left arm, Patient Position: Lying)   Pulse 67   Temp 98.6 °F (37 °C) (Oral)   Resp 16   Ht 182.9 cm (72\")   Wt 118 kg (259 lb 0.7 oz)   SpO2 90%   BMI 35.13 kg/m²   Intake/Output last 3 shifts:  I/O last 3 completed shifts:  In: 120 [P.O.:120]  Out: 800 [Urine:800]  Intake/Output this shift:  No intake/output data recorded.    GEN: Pleasant chronically ill elderly gentleman in no acute distress  RESP:Faint crackles heard  CVS: RRR, S1, S2+  ABD: soft, nontender, nondistended  NEURO: AAOX3  EXT: Minimal lower extremity edema  ACCESS: RIJ TDC      Scheduled Meds:amiodarone, 200 mg, Oral, Daily  [Held by provider] apixaban, 2.5 mg, Oral, BID  vitamin C, 250 mg, Oral, Daily  atorvastatin, 20 mg, Oral, Nightly  bumetanide, 2 mg, Oral, TID  cetaphil, , Topical, Q12H  clobetasol propionate, 1 Application, Topical, Q12H  clopidogrel, 75 mg, Oral, Daily  epoetin vince/vince-epbx, 10,000 Units, Subcutaneous, Once per day on   hydrALAZINE, 10 mg, Oral, TID  insulin lispro, 2-7 Units, Subcutaneous, 4x Daily AC & at Bedtime  [Held by provider] linagliptin, 5 mg, Oral, Daily  Menthol-Zinc Oxide, 1 Application, Topical, BID  metoprolol succinate XL, 25 mg, Oral, Daily  miconazole, 1 Application, Topical, Q12H  [START ON 2025] pantoprazole, 40 mg, Oral, Q AM  sevelamer, 800 mg, Oral, TID With " Meals  sodium chloride, 10 mL, Intravenous, Q12H  sodium chloride, 10 mL, Intravenous, Q12H  sodium chloride, 10 mL, Intravenous, Q12H  sodium hypochlorite, , Topical, BID  tamsulosin, 0.4 mg, Oral, Daily  vancomycin, 125 mg, Oral, Q6H      Continuous Infusions:Pharmacy Consult,       PRN Meds:  acetaminophen **OR** acetaminophen **OR** acetaminophen    calcium carbonate    camphor-menthol    dextrose    dextrose    glucagon (human recombinant)    heparin (porcine)    heparin    hydrOXYzine    lidocaine    loperamide    melatonin    nitroglycerin    ondansetron ODT **OR** ondansetron    oxybutynin    oxyCODONE-acetaminophen    Pharmacy Consult    polyethylene glycol    sodium chloride    sodium chloride    sodium chloride    sodium chloride    sodium chloride    sodium chloride    sodium chloride         Data Review:                                                                           Labs reviewed        Imaging:                                                                           Radiology reviewed           ASSESSMENT:                                                                                Anemia    Essential hypertension    Type 2 diabetes mellitus with circulatory disorder, without long-term current use of insulin    SHASTA (obstructive sleep apnea)    H/O aortic valve replacement with porcine valve    Amputated toe of right foot    S/P CABG x 2    Amputated toe of left foot    Atrial fibrillation, persistent    Persistent atrial fibrillation    CKD (chronic kidney disease) stage 4, GFR 15-29 ml/min    Nocturnal hypoxemia    Unsteady gait when walking    Chronic heart failure with preserved ejection fraction (HFpEF)    GI bleed    Peritoneal dialysis status    Hypoalbuminemia    Moderate protein-calorie malnutrition    Acute blood loss anemia     CKD with hospitalization in March 2025 requiring HD due to KILLIAN likely ATN 2/2 rhabdomyolysis, prerenal insult related to diuretics. OM, now on next stage  HD 5 days per week (MTWThF) with RIJ TDC  Acute TYLER, hematuria  Urinary retention  Bladder mass s/p resection  A-fib with controlled rates.  History of HFpEF with pulmonary hypertension  Recent osteomyelitis and abscess right foot  T2DM  HTN  A-fib  CAD  PVD   TTE 5/23/25 with EF 56-60%, mild transvalvular regurgitation in prosthetic AV, RVSP >55 mmHg      PLAN:                                                                            Plan for HD today per TTS schedule..  Gross hematuria managed by urology.  Requiring CIC's.  Continue with diuretics.  Continue antihypertensives.  Continue phosphate binder.  Continue EPO with HD.  Status post EGD, GI input noted  On p.o. vancomycin for C. difficile.    D/W RN.     Tiesha Mccrary MD  [facility]  5/29/2025  11:05 EDT

## 2025-05-30 LAB
ALBUMIN SERPL-MCNC: 2.9 G/DL (ref 3.5–5.2)
ANION GAP SERPL CALCULATED.3IONS-SCNC: 9 MMOL/L (ref 5–15)
BUN SERPL-MCNC: 18 MG/DL (ref 8–23)
BUN/CREAT SERPL: 7.4 (ref 7–25)
CALCIUM SPEC-SCNC: 8.6 MG/DL (ref 8.6–10.5)
CHLORIDE SERPL-SCNC: 104 MMOL/L (ref 98–107)
CO2 SERPL-SCNC: 25 MMOL/L (ref 22–29)
CREAT SERPL-MCNC: 2.43 MG/DL (ref 0.76–1.27)
DEPRECATED RDW RBC AUTO: 53.4 FL (ref 37–54)
EGFRCR SERPLBLD CKD-EPI 2021: 26.2 ML/MIN/1.73
ERYTHROCYTE [DISTWIDTH] IN BLOOD BY AUTOMATED COUNT: 15.2 % (ref 12.3–15.4)
GLUCOSE BLDC GLUCOMTR-MCNC: 121 MG/DL (ref 70–130)
GLUCOSE BLDC GLUCOMTR-MCNC: 139 MG/DL (ref 70–130)
GLUCOSE BLDC GLUCOMTR-MCNC: 143 MG/DL (ref 70–130)
GLUCOSE BLDC GLUCOMTR-MCNC: 173 MG/DL (ref 70–130)
GLUCOSE SERPL-MCNC: 112 MG/DL (ref 65–99)
HCT VFR BLD AUTO: 30.5 % (ref 37.5–51)
HGB BLD-MCNC: 9.5 G/DL (ref 13–17.7)
MAGNESIUM SERPL-MCNC: 1.8 MG/DL (ref 1.6–2.4)
MCH RBC QN AUTO: 30.1 PG (ref 26.6–33)
MCHC RBC AUTO-ENTMCNC: 31.1 G/DL (ref 31.5–35.7)
MCV RBC AUTO: 96.5 FL (ref 79–97)
PHOSPHATE SERPL-MCNC: 2.4 MG/DL (ref 2.5–4.5)
PLATELET # BLD AUTO: 204 10*3/MM3 (ref 140–450)
PMV BLD AUTO: 8.8 FL (ref 6–12)
POTASSIUM SERPL-SCNC: 4.1 MMOL/L (ref 3.5–5.2)
RBC # BLD AUTO: 3.16 10*6/MM3 (ref 4.14–5.8)
SODIUM SERPL-SCNC: 138 MMOL/L (ref 136–145)
WBC NRBC COR # BLD AUTO: 7.81 10*3/MM3 (ref 3.4–10.8)

## 2025-05-30 PROCEDURE — 80069 RENAL FUNCTION PANEL: CPT | Performed by: INTERNAL MEDICINE

## 2025-05-30 PROCEDURE — 83735 ASSAY OF MAGNESIUM: CPT | Performed by: INTERNAL MEDICINE

## 2025-05-30 PROCEDURE — 97530 THERAPEUTIC ACTIVITIES: CPT

## 2025-05-30 PROCEDURE — 85027 COMPLETE CBC AUTOMATED: CPT | Performed by: INTERNAL MEDICINE

## 2025-05-30 PROCEDURE — 63710000001 INSULIN LISPRO (HUMAN) PER 5 UNITS: Performed by: UROLOGY

## 2025-05-30 PROCEDURE — 25010000002 EPOETIN ALFA-EPBX 10000 UNIT/ML SOLUTION: Performed by: UROLOGY

## 2025-05-30 PROCEDURE — 82948 REAGENT STRIP/BLOOD GLUCOSE: CPT

## 2025-05-30 RX ORDER — HYDRALAZINE HYDROCHLORIDE 25 MG/1
25 TABLET, FILM COATED ORAL EVERY 6 HOURS PRN
Status: DISCONTINUED | OUTPATIENT
Start: 2025-05-30 | End: 2025-06-04 | Stop reason: HOSPADM

## 2025-05-30 RX ADMIN — VANCOMYCIN HYDROCHLORIDE 125 MG: 125 CAPSULE ORAL at 18:29

## 2025-05-30 RX ADMIN — VANCOMYCIN HYDROCHLORIDE 125 MG: 125 CAPSULE ORAL at 13:12

## 2025-05-30 RX ADMIN — INSULIN LISPRO 2 UNITS: 100 INJECTION, SOLUTION INTRAVENOUS; SUBCUTANEOUS at 13:13

## 2025-05-30 RX ADMIN — CLOPIDOGREL BISULFATE 75 MG: 75 TABLET, FILM COATED ORAL at 10:08

## 2025-05-30 RX ADMIN — CAMPHOR, MENTHOL: .5; .5 LOTION TOPICAL at 10:08

## 2025-05-30 RX ADMIN — APIXABAN 2.5 MG: 2.5 TABLET, FILM COATED ORAL at 20:53

## 2025-05-30 RX ADMIN — HYDRALAZINE HYDROCHLORIDE 10 MG: 10 TABLET ORAL at 20:53

## 2025-05-30 RX ADMIN — ANTI-FUNGAL POWDER MICONAZOLE NITRATE TALC FREE 1 APPLICATION: 1.42 POWDER TOPICAL at 20:54

## 2025-05-30 RX ADMIN — CLOBETASOL PROPIONATE CREAM USP, 0.05% 1 APPLICATION: 0.5 CREAM TOPICAL at 10:08

## 2025-05-30 RX ADMIN — BUMETANIDE 2 MG: 1 TABLET ORAL at 05:59

## 2025-05-30 RX ADMIN — Medication 5 MG: at 20:52

## 2025-05-30 RX ADMIN — MENTHOL, ZINC OXIDE 1 APPLICATION: .44; 20.6 OINTMENT TOPICAL at 10:08

## 2025-05-30 RX ADMIN — DAKIN'S SOLUTION 0.125% (QUARTER STRENGTH): 0.12 SOLUTION at 20:53

## 2025-05-30 RX ADMIN — APIXABAN 2.5 MG: 2.5 TABLET, FILM COATED ORAL at 10:08

## 2025-05-30 RX ADMIN — Medication 10 ML: at 20:54

## 2025-05-30 RX ADMIN — TAMSULOSIN HYDROCHLORIDE 0.4 MG: 0.4 CAPSULE ORAL at 10:07

## 2025-05-30 RX ADMIN — CLOBETASOL PROPIONATE CREAM USP, 0.05% 1 APPLICATION: 0.5 CREAM TOPICAL at 20:54

## 2025-05-30 RX ADMIN — DAKIN'S SOLUTION 0.125% (QUARTER STRENGTH): 0.12 SOLUTION at 13:53

## 2025-05-30 RX ADMIN — Medication: at 20:54

## 2025-05-30 RX ADMIN — Medication: at 10:08

## 2025-05-30 RX ADMIN — AMIODARONE HYDROCHLORIDE 200 MG: 200 TABLET ORAL at 10:07

## 2025-05-30 RX ADMIN — METOPROLOL SUCCINATE 25 MG: 25 TABLET, EXTENDED RELEASE ORAL at 10:08

## 2025-05-30 RX ADMIN — MENTHOL, ZINC OXIDE 1 APPLICATION: .44; 20.6 OINTMENT TOPICAL at 20:53

## 2025-05-30 RX ADMIN — HYDRALAZINE HYDROCHLORIDE 10 MG: 10 TABLET ORAL at 18:29

## 2025-05-30 RX ADMIN — OXYCODONE HYDROCHLORIDE AND ACETAMINOPHEN 250 MG: 500 TABLET ORAL at 10:07

## 2025-05-30 RX ADMIN — PANTOPRAZOLE SODIUM 40 MG: 40 TABLET, DELAYED RELEASE ORAL at 05:59

## 2025-05-30 RX ADMIN — ATORVASTATIN CALCIUM 20 MG: 20 TABLET, FILM COATED ORAL at 20:53

## 2025-05-30 RX ADMIN — BUMETANIDE 2 MG: 1 TABLET ORAL at 13:12

## 2025-05-30 RX ADMIN — HYDRALAZINE HYDROCHLORIDE 10 MG: 10 TABLET ORAL at 10:08

## 2025-05-30 RX ADMIN — EPOETIN ALFA-EPBX 10000 UNITS: 10000 INJECTION, SOLUTION INTRAVENOUS; SUBCUTANEOUS at 10:08

## 2025-05-30 RX ADMIN — LIDOCAINE HYDROCHLORIDE: 20 JELLY TOPICAL at 23:13

## 2025-05-30 RX ADMIN — VANCOMYCIN HYDROCHLORIDE 125 MG: 125 CAPSULE ORAL at 23:22

## 2025-05-30 RX ADMIN — VANCOMYCIN HYDROCHLORIDE 125 MG: 125 CAPSULE ORAL at 05:59

## 2025-05-30 RX ADMIN — ANTI-FUNGAL POWDER MICONAZOLE NITRATE TALC FREE 1 APPLICATION: 1.42 POWDER TOPICAL at 10:08

## 2025-05-30 RX ADMIN — BUMETANIDE 2 MG: 1 TABLET ORAL at 18:29

## 2025-05-30 NOTE — PLAN OF CARE
Goal Outcome Evaluation:  Plan of Care Reviewed With: patient        Progress: improving  Outcome Evaluation: Patient seen for OT/Pt co-treat this am.  Patient resting in bed upon arrival and agreeable to tx.  Patient able to transition to EOB with Mod A x2.  Patient able to wash face and hands with CGA  from EOB.  Dynamic reaching to increase core stability and balance responses with RUE.  STS was defered due to patient not being able to maintain NWB on RLE/LUE.  Patient returned to supine in bed with Mod A x2. Cont OT as tolerated.    Anticipated Discharge Disposition (OT): skilled nursing facility

## 2025-05-30 NOTE — PLAN OF CARE
Goal Outcome Evaluation:  Plan of Care Reviewed With: patient           Outcome Evaluation: Upon entering room, pt. supine in bed, awake/alert, and agreeable to work with P.T./O.T. this date despite c/o weakness and Left shoulder/clavicle pain (with movement only).  This AM, pt. requires Mod. assist x 2 for bed mobility.  BLE ther. ex. program x 10 reps completed for general strengthening.  Pt. also performed reaching activities (using RUE only) at varying angles to work on core strengthening and balance training.  Unable to attempt sit <-> stand transfers as pt. cannot safely maintain NWB RLE and NWB LUE precautions.  Pt.'s Left knee is also compromised and requires a K.I. when attempting to stand making it more difficult for him to achieve this activity.  Will continue to progress functional mobility as tolerated.    Anticipated Discharge Disposition (PT): skilled nursing facility

## 2025-05-30 NOTE — CASE MANAGEMENT/SOCIAL WORK
Continued Stay Note  [facility]     Patient Name: Rock Maciel Jr.  MRN: [id number]  Today's Date: 5/30/2025    Admit Date: 5/21/2025    Plan: Plan [facility] for HD and skilled care- pre cert initiated.   MARIANNE Solis RN   Discharge Plan       Row Name 05/30/25 1257       Plan    Plan Plan [facility] for HD and skilled care- pre cert initiated.   MARIANNE Solis RN    Patient/Family in Agreement with Plan yes    Plan Comments Per Mirella  ( [phone]) [facility] can accept pt this weekend but pt needs to have HD on Saturday prior to leaving.  Plan [facility] for HD and skilled care- pre cert initiated. MARIANNE Solis RN      Row Name 05/30/25 1158       Plan    Plan Plan [facility] for HD and skilled care- pre cert initiated. MARIANNE Solis RN    Plan Comments Per Mirella ( [phone]) [facility] can accept pt for HD and skilled care. Updated  PT notes have been entered to initiate pre cert. Information sent to [facility].   Plan [facility] for HD and skilled care- pre cert needed. MARIANNE Solis RN      Row Name 05/30/25 1040       Plan    Plan Plan [facility] for HD and skilled care- pre cert needed.  MARIANNE Solis RN    Patient/Family in Agreement with Plan yes    Plan Comments Per Mirella  ( [phone]) [facility] can accept pt for HD and skilled care.  Requested updated PT notes to initiate pre cert.   Plan [facility] for HD and skilled care- pre cert needed. MARIANNE Solis RN                   Discharge Codes    No documentation.                 Expected Discharge Date and Time       Expected Discharge Date Expected Discharge Time    May 31, 2025               Emily Solis RN

## 2025-05-30 NOTE — CASE MANAGEMENT/SOCIAL WORK
Continued Stay Note  [facility]     Patient Name: Rock Maciel Jr.  MRN: [id number]  Today's Date: 5/30/2025    Admit Date: 5/21/2025    Plan: Plan [facility] for HD and skilled care- pre cert needed.  MARIANNE Solis RN   Discharge Plan       Row Name 05/30/25 1040       Plan    Plan Plan [facility] for HD and skilled care- pre cert needed.  MARIANNE Solis RN    Patient/Family in Agreement with Plan yes    Plan Comments Per Mirella  ( [phone]) [facility] can accept pt for HD and skilled care.  Requested updated PT notes to initiate pre cert.   Plan [facility] for HD and skilled care- pre cert needed. MARIANNE Solis RN                   Discharge Codes    No documentation.                 Expected Discharge Date and Time       Expected Discharge Date Expected Discharge Time    May 31, 2025               Emily Solis RN

## 2025-05-30 NOTE — THERAPY TREATMENT NOTE
Patient Name: Rock Maciel Jr.  : 1945    MRN: [id number]                              Today's Date: 2025       Admit Date: 2025    Visit Dx:     ICD-10-CM ICD-9-CM   1. Acute blood loss anemia  D62 285.1   2. Rectal bleeding  K62.5 569.3   3. ESRD on hemodialysis  N18.6 585.6    Z99.2 V45.11   4. Gastrointestinal hemorrhage, unspecified gastrointestinal hemorrhage type  K92.2 578.9     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    Chronic venous insufficiency    Nonrheumatic aortic valve stenosis    Carotid stenosis, asymptomatic    Bradycardia    Hyperlipidemia    Bilateral carotid artery disease    Abnormal cardiovascular stress test    H/O aortic valve replacement with porcine valve    Charcot-Davida-Tooth disease-like deformity of foot    Amputated toe of right foot    Fall    Non-traumatic rhabdomyolysis    S/P CABG x 2    CKD (chronic kidney disease) stage 3, GFR 30-59 ml/min    Rupture of left quadriceps tendon    Constipation    KILLIAN (acute kidney injury)    Wound of right leg    Anemia    Chronic diastolic (congestive) heart failure    Amputated toe of left foot    Gas gangrene    Cellulitis of left lower limb    Acquired absence of left foot    Bilateral lower extremity edema    Stage 3b chronic kidney disease    History of pneumonia    Atelectasis of both lungs    Abnormal pleural fluid    Pleural thickening    Atrial fibrillation, persistent    Chronic anticoagulation    Persistent atrial fibrillation    Bladder wall thickening    Hematuria    CKD (chronic kidney disease) stage 4, GFR 15-29  ml/min    Hypoxemia    Nocturnal hypoxemia    Status post left inguinal hernia repair, follow-up exam    Weakness of both lower extremities    Unsteady gait when walking    Wound, open, arm, forearm, right, subsequent encounter    Traumatic rhabdomyolysis    Closed displaced fracture of left clavicle    Pneumonia due to infectious organism    ATN (acute tubular necrosis)    Contusion of left knee    Acute osteomyelitis of right foot    Diabetic foot infection    MRSA (methicillin resistant Staphylococcus aureus) infection    Acute kidney injury    Chronic heart failure with preserved ejection fraction (HFpEF)    GI bleed    Peritoneal dialysis status    Hypoalbuminemia    Moderate protein-calorie malnutrition    Acute blood loss anemia     Past Medical History:   Diagnosis Date    Acquired absence of left foot 02/19/2022    Acute osteomyelitis of right foot 03/07/2025    Allergic rhinitis     Amputated toe of left foot 02/12/2021    Amputated toe of right foot 04/11/2019    ATN (acute tubular necrosis) 03/05/2025    Atrial fibrillation, persistent 10/18/2024    Bladder wall thickening 12/01/2024    Bronchitis     Carotid stenosis, asymptomatic 01/30/2017    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  Added automatically from request for surgery [id number]      Gout     H/O aortic valve replacement with porcine valve 09/10/2018    Hiatal hernia     History of MRSA infection     RIGHT FOOT 2010    Hyperlipidemia     Hypertension     LAFB (left  anterior fascicular block) 05/17/2016    MRSA (methicillin resistant Staphylococcus aureus) infection 03/08/2025    Murmur     Neuropathic arthropathy 05/17/2016    Overview:   2015 IMO UPDATE  Overview:   2015 IMO UPDATE      Nocturnal hypoxemia 12/01/2024    Nonrheumatic aortic valve stenosis 01/30/2017    Persistent atrial fibrillation 11/07/2024    Pneumothorax on right     S/P CABG x 2 05/06/2019 July 2018      Sleep apnea     pt wears CPAP at night    Status post left inguinal hernia repair, follow-up exam 01/05/2025    Traumatic rhabdomyolysis 03/03/2025    Type 2 diabetes mellitus with circulatory disorder, without long-term current use of insulin 05/17/2016    Unsteady gait when walking 01/05/2025    Wellness examination 06/24/2015    Annual Wellness Visit     Past Surgical History:   Procedure Laterality Date    ARTERY SURGERY Bilateral     carotid    BONE EXCISION LEG Right 3/10/2025    Procedure: BONE EXCISION LOWER EXTERMITY, RIGHT;  Surgeon: Jimenez Mchugh DPM;  Location: [facility] MAIN OR;  Service: Podiatry;  Laterality: Right;    CARDIAC CATHETERIZATION N/A 7/20/2018    Procedure: Left Heart Cath;  Surgeon: Jo Toney MD;  Location:  [facility] CATH INVASIVE LOCATION;  Service: Cardiovascular    CARDIAC CATHETERIZATION N/A 7/20/2018    Procedure: Coronary angiography;  Surgeon: Jo Toney MD;  [facility];  Service: Cardiovascular    CAROTID ENDARTERECTOMY Bilateral     COLONOSCOPY  2008    CORONARY ARTERY BYPASS GRAFT WITH AORTIC VALVE REPAIR/REPLACEMENT N/A 7/23/2018    Procedure: INTRAOPERATIVE ALEX, MIDLINE STERNOTOMY, CORONARY ARTERY BYPASS GRAFTING X 3 USING ENDOSCOPICALLY HARVESTED LEFT GREATER SAPHENOUS VEIN,  AORTIC VALVE REPLACEMENT USING 25MM LOPEZ II ULTRA PORCINE VALVE, PRP;  Surgeon: Phong Posey MD;  Location: [facility] MAIN OR;  Service: Cardiothoracic    CYSTOSCOPY WITH CLOT EVACUATION N/A 5/24/2025    Procedure: CYSTOSCOPY WITH  CLOT EVACUATION, RESECTION OF BLADDER MASS AND PLACEMENT OF CONTINOUS BLADDER IRRIGATION CATHETER;  Surgeon: Prosper Cornejo MD;  Location: [facility] MAIN OR;  Service: Urology;  Laterality: N/A;    ENDOSCOPY N/A 5/25/2025    Procedure: ESOPHAGOGASTRODUODENOSCOPY with biopsy;  Surgeon: Darrian Webb MD;  Location: [facility] ENDOSCOPY;  Service: Gastroenterology;  Laterality: N/A;  congested gastropathy    FOOT SURGERY Right 2010    5th digit removal    FOOT SURGERY Left 2011    1 digit removed    INCISION AND DRAINAGE LEG Right 3/28/2020    Procedure: DEBRIDMENT OF RIGHT CALF;  Surgeon: Ross Pineda MD;  Location: [facility] MAIN OR;  Service: Vascular;  Laterality: Right;    INCISION AND DRAINAGE LEG Right 3/10/2025    Procedure: INCISION AND DRAINAGE LOWER EXTREMITY;  Surgeon: Jimenez Mchugh DPM;  Location: [facility] MAIN OR;  Service: Podiatry;  Laterality: Right;    INGUINAL HERNIA REPAIR Left 11/29/2024    Procedure: INGUINAL HERNIA REPAIR LAPAROSCOPIC WITH DAVINCI ROBOT;  Surgeon: Phong Lazo MD;  Location: [facility] MAIN OR;  Service: Robotics - DaVinci;  Laterality: Left;    INSERTION HEMODIALYSIS CATHETER N/A 3/11/2025    Procedure: HEMODIALYSIS CATHETER INSERTION;  Surgeon: Jeaneth Bonds MD;  Location: [facility] MAIN OR;  Service: Vascular;  Laterality: N/A;    QUADRICEPS TENDON REPAIR Left 5/14/2019    Procedure: Left QUADRICEPS TENDON REPAIR;  Surgeon: Camilo Hunter MD;  Location: [facility] MAIN OR;  Service: Orthopedics    THORACENTESIS Right 11/21/2016    THORACOSCOPY Right 5/8/2017    Procedure: BRONCHOSCOPY, RIGHT VAT,  TOTAL DECORTICATION RIGHT LUNG, PLEURAL BX, PLACEMENT SUBPLEURAL PAIN CAATHETERS X2;  Surgeon: Donald Orlando III, MD;  Location: [facility] MAIN OR;  Service:     TONSILECTOMY, ADENOIDECTOMY, BILATERAL MYRINGOTOMY AND TUBES      WOUND DEBRIDEMENT Right 3/13/2025    Procedure: DEBRIDEMENT FOOT, RIGHT;  Surgeon: Jimenez Mchugh DPM;  Location: [facility] MAIN OR;   Service: Podiatry;  Laterality: Right;      General Information       Row Name 05/30/25 1237          OT Time and Intention    Document Type therapy note (daily note)  -JR     Mode of Treatment occupational therapy;physical therapy;co-treatment  -JR     Patient Effort good  -JR       Row Name 05/30/25 1237          General Information    Patient Profile Reviewed yes  -JR     Existing Precautions/Restrictions fall  -JR       Row Name 05/30/25 1237          Cognition    Orientation Status (Cognition) oriented x 3  -JR       Row Name 05/30/25 1237          Safety Issues/Impairments Affecting Functional Mobility    Impairments Affecting Function (Mobility) balance;endurance/activity tolerance;pain;postural/trunk control;range of motion (ROM);strength  -JR     Comment, Safety Issues/Impairments (Mobility) PT/OT cotreatment medically appropriate and necessary due to patient acuity level, to maximize therapeutic benefit due to impaired act tolerance, and for safety of patient and staff. Treatment focused on progression of care and goals established in POC.  -JR               User Key  (r) = Recorded By, (t) = Taken By, (c) = Cosigned By      Initials Name Provider Type    Ru Patel OT Occupational Therapist                     Mobility/ADL's       Row Name 05/30/25 1238          Bed Mobility    Supine-Sit [place] (Bed Mobility) moderate assist (50% patient effort);2 person assist  -JR     Sit-Supine [place] (Bed Mobility) moderate assist (50% patient effort);2 person assist  -JR     Assistive Device (Bed Mobility) repositioning sheet  -       Row Name 05/30/25 1238          Mobility    Extremity Weight-bearing Status left upper extremity;right lower extremity  -JR     Left Upper Extremity (Weight-bearing Status) non weight-bearing (NWB)  -JR     Right Lower Extremity (Weight-bearing Status) non weight-bearing (NWB)  -               User Key  (r) = Recorded By, (t) = Taken By, (c) = Cosigned By       Initials Name Provider Type     Ru Hernandez OT Occupational Therapist                   Obj/Interventions       Row Name 05/30/25 1238          Balance    Balance Assessment sitting static balance;sitting dynamic balance  -JR     Static Sitting Balance contact guard  -JR     Dynamic Sitting Balance contact guard  -JR     Position, Sitting Balance sitting edge of bed  -               User Key  (r) = Recorded By, (t) = Taken By, (c) = Cosigned By      Initials Name Provider Type     Ru Hernandez OT Occupational Therapist                   Goals/Plan    No documentation.                  Clinical Impression       Row Name 05/30/25 1239          Pain Assessment    Pretreatment Pain Rating 0/10 - no pain  -     Posttreatment Pain Rating 8/10  -     Pain Location shoulder  -     Pain Side/Orientation left  -     Pain Management Interventions positioning techniques utilized  -       Row Name 05/30/25 1239          Plan of Care Review    Plan of Care Reviewed With patient  -     Progress improving  -     Outcome Evaluation Patient seen for OT/Pt co-treat this am.  Patient resting in bed upon arrival and agreeable to tx.  Patient able to transition to EOB with Mod A x2.  Patient able to wash face and hands with CGA  from EOB.  Dynamic reaching to increase core stability and balance responses with RUE.  STS was defered due to patient not being able to maintain NWB on RLE/LUE.  Patient returned to supine in bed with Mod A x2. Cont OT as tolerated.  -       Row Name 05/30/25 1239          Therapy Assessment/Plan (OT)    Rehab Potential (OT) good  -     Criteria for Skilled Therapeutic Interventions Met (OT) yes;skilled treatment is necessary  -     Therapy Frequency (OT) 3 times/wk  -       Row Name 05/30/25 1239          Therapy Plan Review/Discharge Plan (OT)    Anticipated Discharge Disposition (OT) skilled nursing facility  -       Row Name 05/30/25 1239          Vital Signs    O2 Delivery Pre  Treatment supplemental O2  -JR     O2 Delivery Intra Treatment supplemental O2  -JR     O2 Delivery Post Treatment supplemental O2  -JR     Pre Patient Position Supine  -JR     Intra Patient Position Sitting  -JR     Post Patient Position Supine  -JR       Row Name 05/30/25 1239          Positioning and Restraints    Pre-Treatment Position in bed  -JR     Post Treatment Position bed  -JR     In Bed notified nsg;call light within reach;encouraged to call for assist;exit alarm on  -JR               User Key  (r) = Recorded By, (t) = Taken By, (c) = Cosigned By      Initials Name Provider Type    Ru Patel, OT Occupational Therapist                   Outcome Measures       Row Name 05/30/25 1255          How much help from another is currently needed...    Putting on and taking off regular lower body clothing? 2  -JR     Bathing (including washing, rinsing, and drying) 2  -JR     Toileting (which includes using toilet bed pan or urinal) 2  -JR     Putting on and taking off regular upper body clothing 2  -JR     Taking care of personal grooming (such as brushing teeth) 2  -JR     Eating meals 3  -JR     AM-PAC 6 Clicks Score (OT) 13  -JR       Row Name 05/30/25 1140 05/30/25 0805       How much help from another person do you currently need...    Turning from your back to your side while in flat bed without using bedrails? 2  -MS 2  -BR    Moving from lying on back to sitting on the side of a flat bed without bedrails? 2  -MS 2  -BR    Moving to and from a bed to a chair (including a wheelchair)? 1  -MS 1  -BR    Standing up from a chair using your arms (e.g., wheelchair, bedside chair)? 1  -MS 1  -BR    Climbing 3-5 steps with a railing? 1  -MS 1  -BR    To walk in hospital room? 1  -MS 1  -BR    AM-PAC 6 Clicks Score (PT) 8  -MS 8  -BR    Highest Level of Mobility Goal Sit at Edge of Bed-3  -MS Sit at Edge of Bed-3  -BR      Row Name 05/30/25 3028          Modified [place] Scale    Modified Kaiser Scale 4 -  Moderately severe disability.  Unable to walk without assistance, and unable to attend to own bodily needs without assistance.  -JR       Row Name 05/30/25 1255 05/30/25 1140       Functional Assessment    Outcome Measure Options AM-PAC 6 Clicks Daily Activity (OT);Modified Kaiser  -JR AM-PAC 6 Clicks Basic Mobility (PT)  -MS              User Key  (r) = Recorded By, (t) = Taken By, (c) = Cosigned By      Initials Name Provider Type    Greg Salmeron, RN Registered Nurse    MS PeñaKowalskiJd benson, PT Physical Therapist    Ru Patel OT Occupational Therapist                      OT Recommendation and Plan  Therapy Frequency (OT): 3 times/wk  Plan of Care Review  Plan of Care Reviewed With: patient  Progress: improving  Outcome Evaluation: Patient seen for OT/Pt co-treat this am.  Patient resting in bed upon arrival and agreeable to tx.  Patient able to transition to EOB with Mod A x2.  Patient able to wash face and hands with CGA  from EOB.  Dynamic reaching to increase core stability and balance responses with RUE.  STS was defered due to patient not being able to maintain NWB on RLE/LUE.  Patient returned to supine in bed with Mod A x2. Cont OT as tolerated.     Time Calculation:         Time Calculation- OT       Row Name 05/30/25 1256             Time Calculation- OT    OT Start Time 1110  -JR      OT Stop Time 1128  -JR      OT Time Calculation (min) 18 min  -JR      Total Timed Code Minutes- OT 18 minute(s)  -JR      OT Received On 05/30/25  -JR      OT - Next Appointment 06/02/25  -         Timed Charges    67975 - OT Therapeutic Activity Minutes 18  -JR         Total Minutes    Timed Charges Total Minutes 18  -JR       Total Minutes 18  -JR                User Key  (r) = Recorded By, (t) = Taken By, (c) = Cosigned By      Initials Name Provider Type    Ru Patel OT Occupational Therapist                  Therapy Charges for Today       Code Description Service Date Service Provider Modifiers Qty     [id number]  OT THERAPEUTIC ACT EA 15 MIN 5/30/2025 Ru Hernandez OT GO 1                 Ru Hernandez OT  5/30/2025

## 2025-05-30 NOTE — CASE MANAGEMENT/SOCIAL WORK
Post-Acute Authorization Submission      Post Acute Pre-Cert Documentation  Request Submitted by Facility - Type:: Hospital  Post-Acute Authorization Type Submitted:: SNF  Date Post Acute Pre-Cert Inititated per Facility: 05/30/25  [place] [facility] [place]  All Clinicals Submitted?: Yes  Had Accepting Facility at Time of Submission: Yes  Response Communicated to:: , Accepting Facility Liaison  Authorization Number:: PENDING [id number]              MARSHALL Blackman

## 2025-05-30 NOTE — THERAPY TREATMENT NOTE
Patient Name: Rock Maciel Jr.  : 1945    MRN: [id number]                              Today's Date: 2025       Admit Date: 2025    Visit Dx:     ICD-10-CM ICD-9-CM   1. Acute blood loss anemia  D62 285.1   2. Rectal bleeding  K62.5 569.3   3. ESRD on hemodialysis  N18.6 585.6    Z99.2 V45.11   4. Gastrointestinal hemorrhage, unspecified gastrointestinal hemorrhage type  K92.2 578.9     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    Chronic venous insufficiency    Nonrheumatic aortic valve stenosis    Carotid stenosis, asymptomatic    Bradycardia    Hyperlipidemia    Bilateral carotid artery disease    Abnormal cardiovascular stress test    H/O aortic valve replacement with porcine valve    Charcot-Davida-Tooth disease-like deformity of foot    Amputated toe of right foot    Fall    Non-traumatic rhabdomyolysis    S/P CABG x 2    CKD (chronic kidney disease) stage 3, GFR 30-59 ml/min    Rupture of left quadriceps tendon    Constipation    KILILAN (acute kidney injury)    Wound of right leg    Anemia    Chronic diastolic (congestive) heart failure    Amputated toe of left foot    Gas gangrene    Cellulitis of left lower limb    Acquired absence of left foot    Bilateral lower extremity edema    Stage 3b chronic kidney disease    History of pneumonia    Atelectasis of both lungs    Abnormal pleural fluid    Pleural thickening    Atrial fibrillation, persistent    Chronic anticoagulation    Persistent atrial fibrillation    Bladder wall thickening    Hematuria    CKD (chronic kidney disease) stage 4, GFR 15-29  ml/min    Hypoxemia    Nocturnal hypoxemia    Status post left inguinal hernia repair, follow-up exam    Weakness of both lower extremities    Unsteady gait when walking    Wound, open, arm, forearm, right, subsequent encounter    Traumatic rhabdomyolysis    Closed displaced fracture of left clavicle    Pneumonia due to infectious organism    ATN (acute tubular necrosis)    Contusion of left knee    Acute osteomyelitis of right foot    Diabetic foot infection    MRSA (methicillin resistant Staphylococcus aureus) infection    Acute kidney injury    Chronic heart failure with preserved ejection fraction (HFpEF)    GI bleed    Peritoneal dialysis status    Hypoalbuminemia    Moderate protein-calorie malnutrition    Acute blood loss anemia     Past Medical History:   Diagnosis Date    Acquired absence of left foot 02/19/2022    Acute osteomyelitis of right foot 03/07/2025    Allergic rhinitis     Amputated toe of left foot 02/12/2021    Amputated toe of right foot 04/11/2019    ATN (acute tubular necrosis) 03/05/2025    Atrial fibrillation, persistent 10/18/2024    Bladder wall thickening 12/01/2024    Bronchitis     Carotid stenosis, asymptomatic 01/30/2017    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  Added automatically from request for surgery [id number]      Gout     H/O aortic valve replacement with porcine valve 09/10/2018    Hiatal hernia     History of MRSA infection     RIGHT FOOT 2010    Hyperlipidemia     Hypertension     LAFB (left  anterior fascicular block) 05/17/2016    MRSA (methicillin resistant Staphylococcus aureus) infection 03/08/2025    Murmur     Neuropathic arthropathy 05/17/2016    Overview:   2015 IMO UPDATE  Overview:   2015 IMO UPDATE      Nocturnal hypoxemia 12/01/2024    Nonrheumatic aortic valve stenosis 01/30/2017    Persistent atrial fibrillation 11/07/2024    Pneumothorax on right     S/P CABG x 2 05/06/2019 July 2018      Sleep apnea     pt wears CPAP at night    Status post left inguinal hernia repair, follow-up exam 01/05/2025    Traumatic rhabdomyolysis 03/03/2025    Type 2 diabetes mellitus with circulatory disorder, without long-term current use of insulin 05/17/2016    Unsteady gait when walking 01/05/2025    Wellness examination 06/24/2015    Annual Wellness Visit     Past Surgical History:   Procedure Laterality Date    ARTERY SURGERY Bilateral     carotid    BONE EXCISION LEG Right 3/10/2025    Procedure: BONE EXCISION LOWER EXTERMITY, RIGHT;  Surgeon: Jimenez Mchugh DPM;  Location: [facility] MAIN OR;  Service: Podiatry;  Laterality: Right;    CARDIAC CATHETERIZATION N/A 7/20/2018    Procedure: Left Heart Cath;  Surgeon: Jo Toney MD;  Location:  [facility] CATH INVASIVE LOCATION;  Service: Cardiovascular    CARDIAC CATHETERIZATION N/A 7/20/2018    Procedure: Coronary angiography;  Surgeon: Jo Toney MD;  [facility];  Service: Cardiovascular    CAROTID ENDARTERECTOMY Bilateral     COLONOSCOPY  2008    CORONARY ARTERY BYPASS GRAFT WITH AORTIC VALVE REPAIR/REPLACEMENT N/A 7/23/2018    Procedure: INTRAOPERATIVE ALEX, MIDLINE STERNOTOMY, CORONARY ARTERY BYPASS GRAFTING X 3 USING ENDOSCOPICALLY HARVESTED LEFT GREATER SAPHENOUS VEIN,  AORTIC VALVE REPLACEMENT USING 25MM LOPEZ II ULTRA PORCINE VALVE, PRP;  Surgeon: Phong Posey MD;  Location: [facility] MAIN OR;  Service: Cardiothoracic    CYSTOSCOPY WITH CLOT EVACUATION N/A 5/24/2025    Procedure: CYSTOSCOPY WITH  CLOT EVACUATION, RESECTION OF BLADDER MASS AND PLACEMENT OF CONTINOUS BLADDER IRRIGATION CATHETER;  Surgeon: Prosper Cornejo MD;  Location: [facility] MAIN OR;  Service: Urology;  Laterality: N/A;    ENDOSCOPY N/A 5/25/2025    Procedure: ESOPHAGOGASTRODUODENOSCOPY with biopsy;  Surgeon: Darrian Webb MD;  Location: [facility] ENDOSCOPY;  Service: Gastroenterology;  Laterality: N/A;  congested gastropathy    FOOT SURGERY Right 2010    5th digit removal    FOOT SURGERY Left 2011    1 digit removed    INCISION AND DRAINAGE LEG Right 3/28/2020    Procedure: DEBRIDMENT OF RIGHT CALF;  Surgeon: Ross Pineda MD;  Location: [facility] MAIN OR;  Service: Vascular;  Laterality: Right;    INCISION AND DRAINAGE LEG Right 3/10/2025    Procedure: INCISION AND DRAINAGE LOWER EXTREMITY;  Surgeon: Jimenez Mchugh DPM;  Location: [facility] MAIN OR;  Service: Podiatry;  Laterality: Right;    INGUINAL HERNIA REPAIR Left 11/29/2024    Procedure: INGUINAL HERNIA REPAIR LAPAROSCOPIC WITH DAVINCI ROBOT;  Surgeon: Phong Lazo MD;  Location: [facility] MAIN OR;  Service: Robotics - DaVinci;  Laterality: Left;    INSERTION HEMODIALYSIS CATHETER N/A 3/11/2025    Procedure: HEMODIALYSIS CATHETER INSERTION;  Surgeon: Jeaneth Bonds MD;  Location: [facility] MAIN OR;  Service: Vascular;  Laterality: N/A;    QUADRICEPS TENDON REPAIR Left 5/14/2019    Procedure: Left QUADRICEPS TENDON REPAIR;  Surgeon: Camilo Hunter MD;  Location: [facility] MAIN OR;  Service: Orthopedics    THORACENTESIS Right 11/21/2016    THORACOSCOPY Right 5/8/2017    Procedure: BRONCHOSCOPY, RIGHT VAT,  TOTAL DECORTICATION RIGHT LUNG, PLEURAL BX, PLACEMENT SUBPLEURAL PAIN CAATHETERS X2;  Surgeon: Donald Orlando III, MD;  Location: [facility] MAIN OR;  Service:     TONSILECTOMY, ADENOIDECTOMY, BILATERAL MYRINGOTOMY AND TUBES      WOUND DEBRIDEMENT Right 3/13/2025    Procedure: DEBRIDEMENT FOOT, RIGHT;  Surgeon: Jimenez Mchugh DPM;  Location: [facility] MAIN OR;   Service: Podiatry;  Laterality: Right;      General Information       Row Name 05/30/25 1135          Physical Therapy Time and Intention    Document Type therapy note (daily note)  -MS     Mode of Treatment physical therapy;occupational therapy;co-treatment  Activity limitation due to fatigue/weakness; Working on functional balance and strengthening while performing ADL's  -MS       Row Name 05/30/25 1135          General Information    Patient Profile Reviewed yes  -MS     Existing Precautions/Restrictions fall   Exit alarm;  NWB RLE;  NWB LUE;  K.I. LLE if pt. attempts to stand.  -MS     Barriers to Rehab none identified  -MS       Row Name 05/30/25 1135          Cognition    Orientation Status (Cognition) oriented x 3  -MS               User Key  (r) = Recorded By, (t) = Taken By, (c) = Cosigned By      Initials Name Provider Type    Jd Lord, PT Physical Therapist                   Mobility       Row Name 05/30/25 1136          Bed Mobility    Supine-Sit Whitman (Bed Mobility) moderate assist (50% patient effort);2 person assist  -MS     Sit-Supine Whitman (Bed Mobility) moderate assist (50% patient effort);2 person assist  -MS     Assistive Device (Bed Mobility) repositioning sheet  -MS       Row Name 05/30/25 1136          Transfers    Comment, (Transfers) Pt. unable to attempt sit <-> stand transfers this date;  Unable to maintain his NWB RLE/LUE safely in attempts to perform.  -MS       Row Name 05/30/25 1136          Mobility    Left Upper Extremity (Weight-bearing Status) non weight-bearing (NWB)   -MS     Right Lower Extremity (Weight-bearing Status) non weight-bearing (NWB)   -MS               User Key  (r) = Recorded By, (t) = Taken By, (c) = Cosigned By      Initials Name Provider Type    Jd Lord, PT Physical Therapist                   Obj/Interventions       Row Name 05/30/25 1137          Motor Skills    Therapeutic Exercise --  BLE ther. ex. program x 10 reps  completed (Hip Flexion, LAQ's);  Pt. also performed Reaching (RUE only) x 10 reps at varying angles while sitting EOB for core strength and balance training.  -MS               User Key  (r) = Recorded By, (t) = Taken By, (c) = Cosigned By      Initials Name Provider Type    Jd Lord, PT Physical Therapist                   Goals/Plan    No documentation.                  Clinical Impression       Row Name 05/30/25 1138          Pain    Pain Location shoulder  -MS     Pain Side/Orientation left  -MS     Pain Management Interventions nursing notified;positioning techniques utilized  -MS     Pre/Posttreatment Pain Comment Pt. reports 8/10 pain in his Left shoulder/clavicle if he attempts any movement of his LUE;  Pt. reports 0/10 pain when at rest.   -MS       Row Name 05/30/25 1138          Positioning and Restraints    Pre-Treatment Position in bed  -MS     Post Treatment Position bed  -MS     In Bed notified nsg;supine;call light within reach;encouraged to call for assist;exit alarm on;RLE elevated;LLE elevated;R heel elevated;L heel elevated;LUE elevated  All lines intact.  -MS               User Key  (r) = Recorded By, (t) = Taken By, (c) = Cosigned By      Initials Name Provider Type    Jd Lord, PT Physical Therapist                   Outcome Measures       Row Name 05/30/25 1140          How much help from another person do you currently need...    Turning from your back to your side while in flat bed without using bedrails? 2  -MS     Moving from lying on back to sitting on the side of a flat bed without bedrails? 2  -MS     Moving to and from a bed to a chair (including a wheelchair)? 1  -MS     Standing up from a chair using your arms (e.g., wheelchair, bedside chair)? 1  -MS     Climbing 3-5 steps with a railing? 1  -MS     To walk in hospital room? 1  -MS     AM-PAC 6 Clicks Score (PT) 8  -MS     Highest Level of Mobility Goal Sit at Edge of Bed-3  -MS       Row Name 05/30/25 1141           Functional Assessment    Outcome Measure Options AM-PAC 6 Clicks Basic Mobility (PT)  -MS               User Key  (r) = Recorded By, (t) = Taken By, (c) = Cosigned By      Initials Name Provider Type    MS Nelia Kowalskibalbir VIDAL, PT Physical Therapist                                 Physical Therapy Education       Title: PT OT SLP Therapies (Done)       Topic: Physical Therapy (Done)       Point: Mobility training (Done)       Learning Progress Summary            Patient Acceptance, E,D, VU,NR by MS at 5/30/2025 1140    Acceptance, E, NR,VU by  at 5/28/2025 1135    Acceptance, E, VU by  at 5/27/2025 1756    Acceptance, E, VU by  at 5/27/2025 1649    Acceptance, E,D, VU,NR by MS at 5/23/2025 1102                      Point: Home exercise program (Done)       Learning Progress Summary            Patient Acceptance, E,D, VU,NR by MS at 5/30/2025 1140    Acceptance, E, VU by  at 5/27/2025 1756    Acceptance, E, VU by  at 5/27/2025 1649    Acceptance, E,D, VU,NR by MS at 5/23/2025 1102                      Point: Body mechanics (Done)       Learning Progress Summary            Patient Acceptance, E,D, VU,NR by MS at 5/30/2025 1140    Acceptance, E, VU by  at 5/27/2025 1756    Acceptance, E, VU by  at 5/27/2025 1649    Acceptance, E,D, VU,NR by MS at 5/23/2025 1102                      Point: Precautions (Done)       Learning Progress Summary            Patient Acceptance, E,D, VU,NR by MS at 5/30/2025 1140    Acceptance, E, VU by  at 5/27/2025 1756    Acceptance, E, VU by  at 5/27/2025 1649    Acceptance, E,D, VU,NR by MS at 5/23/2025 1102                                      User Key       Initials Effective Dates Name Provider Type Discipline    MS 06/16/21 -  DexJd, PT Physical Therapist PT     12/20/24 -  Tatyana Nice, RN Extern Registered Nurse Nurse     09/22/22 -  Gertrudis Hernandez, PT Physical Therapist PT                  PT Recommendation and Plan  Planned Therapy  Interventions (PT): balance training, bed mobility training, home exercise program, patient/family education, postural re-education, transfer training, strengthening, ROM (range of motion)  Outcome Evaluation: Upon entering room, pt. supine in bed, awake/alert, and agreeable to work with P.T./O.T. this date despite c/o weakness and Left shoulder/clavicle pain (with movement only).  This AM, pt. requires Mod. assist x 2 for bed mobility.  BLE ther. ex. program x 10 reps completed for general strengthening.  Pt. also performed reaching activities (using RUE only) at varying angles to work on core strengthening and balance training.  Unable to attempt sit <-> stand transfers as pt. cannot safely maintain NWB RLE and NWB LUE precautions.  Pt.'s Left knee is also compromised and requires a K.I. when attempting to stand making it more difficult for him to achieve this activity.  Will continue to progress functional mobility as tolerated.     Time Calculation:         PT Charges       Row Name 05/30/25 1145             Time Calculation    Start Time 1110  -MS      Stop Time 1128  -MS      Time Calculation (min) 18 min  -MS      PT Received On 05/30/25  -MS      PT - Next Appointment 06/02/25  -MS         Time Calculation- PT    Total Timed Code Minutes- PT 17 minute(s)  -MS                User Key  (r) = Recorded By, (t) = Taken By, (c) = Cosigned By      Initials Name Provider Type    Jd Lord, PT Physical Therapist                  Therapy Charges for Today       Code Description Service Date Service Provider Modifiers Qty    [id number]  PT THERAPEUTIC ACT EA 15 MIN 5/30/2025 Jd Kowalski, PT GP 1            PT G-Codes  Outcome Measure Options: AM-PAC 6 Clicks Basic Mobility (PT)  AM-PAC 6 Clicks Score (PT): 8  AM-PAC 6 Clicks Score (OT): 13  PT Discharge Summary  Anticipated Discharge Disposition (PT): skilled nursing facility    dJ Kowalski, PT  5/30/2025

## 2025-05-30 NOTE — PROGRESS NOTES
Name: Rock Maciel Jr. ADMIT: 2025   : 1945  PCP: Denise Dickson APRN    MRN: [id number] LOS: 9 days   AGE/SEX: 79 y.o. male  ROOM: [facility]     Subjective   Subjective   Chief Complaint   Patient presents with   • Abnormal Lab     Resting comfortably.  NAD.     Objective   Objective   Vital Signs  Temp:  [97.7 °F (36.5 °C)-98.6 °F (37 °C)] 98.2 °F (36.8 °C)  Heart Rate:  [66-88] 80  Resp:  [16] 16  BP: (157-184)/(50-65) 178/61  SpO2:  [94 %-98 %] 94 %  on  Flow (L/min) (Oxygen Therapy):  [2] 2;   Device (Oxygen Therapy): nasal cannula  Body mass index is 35.01 kg/m².    Physical Exam  Vitals and nursing note reviewed.   Constitutional:       General: He is not in acute distress.     Appearance: He is not diaphoretic.   Cardiovascular:      Rate and Rhythm: Normal rate and regular rhythm.   Pulmonary:      Effort: Pulmonary effort is normal.      Breath sounds: Decreased breath sounds present. No wheezing.   Abdominal:      Palpations: Abdomen is soft.      Tenderness: There is no abdominal tenderness.   Musculoskeletal:      Right lower leg: Edema present.      Left lower leg: Edema present.      Comments: trace   Skin:     General: Skin is dry.   Neurological:      Mental Status: He is alert. Mental status is at baseline.   Psychiatric:         Mood and Affect: Mood normal.         Behavior: Behavior normal.       Results Review  I reviewed the patient's new clinical results.    Results from last 7 days   Lab Units 25  0557 25  0551 25  0625  0656   WBC 10*3/mm3 7.81 7.25 7.01 8.02   HEMOGLOBIN g/dL 9.5* 9.0* 8.6* 8.2*   PLATELETS 10*3/mm3 204 200 191 222     Results from last 7 days   Lab Units 25  0557 25  0551 25  0628 25  0656   SODIUM mmol/L 138 134* 134* 135*   POTASSIUM mmol/L 4.1 4.1 4.0 4.6   CHLORIDE mmol/L 104 102 100 103   CO2 mmol/L 25.0 24.0 24.3 24.0   BUN mg/dL 18.0 28.0* 23.0 39*   CREATININE mg/dL 2.43* 3.20* 2.60* 4.39*    GLUCOSE mg/dL 112* 104* 82 78   EGFR mL/min/1.73 26.2* 18.8* 24.2* 12.9*     Results from last 7 days   Lab Units 05/30/25  0557 05/29/25  0551 05/28/25  0628 05/26/25  0540 05/25/25  0510 05/24/25  0749   ALBUMIN g/dL 2.9* 2.7* 2.7* 2.4* 2.7* 2.8*   BILIRUBIN mg/dL  --   --   --  0.2 0.3 0.3   ALK PHOS U/L  --   --   --  89 91 92   AST (SGOT) U/L  --   --   --  13 14 12   ALT (SGPT) U/L  --   --   --  10 11 10     Results from last 7 days   Lab Units 05/30/25  0557 05/29/25  0551 05/28/25  0628 05/27/25  0656 05/26/25  0540   CALCIUM mg/dL 8.6 8.4* 8.2* 8.3* 8.4*   ALBUMIN g/dL 2.9* 2.7* 2.7*  --  2.4*   MAGNESIUM mg/dL 1.8 2.0 2.0  --   --    PHOSPHORUS mg/dL 2.4* 3.2 3.1 4.6* 4.0         Hemoglobin A1C   Date/Time Value Ref Range Status   05/28/2025 0628 5.20 4.80 - 5.60 % Final     Glucose   Date/Time Value Ref Range Status   05/30/2025 1031 173 (H) 70 - 130 mg/dL Final   05/30/2025 0629 121 70 - 130 mg/dL Final   05/29/2025 2015 182 (H) 70 - 130 mg/dL Final   05/29/2025 1529 136 (H) 70 - 130 mg/dL Final   05/29/2025 1035 159 (H) 70 - 130 mg/dL Final   05/29/2025 0659 104 70 - 130 mg/dL Final   05/28/2025 2055 157 (H) 70 - 130 mg/dL Final       XR Chest 1 View  Result Date: 5/28/2025  No focal consolidation. No pleural effusion or pneumothorax. Likely skinfold overlying the left upper lobe with lung markings extending peripherally. Right IJ dialysis catheter with tip overlying the right atrium. Normal size cardiomediastinal silhouette with prosthetic aortic valve. Nondisplaced median sternotomy wires.  This report was finalized on 5/28/2025 1:48 PM by Dr. Jd Maurer M.D on Workstation: [id number]        I have personally reviewed all medications:  Scheduled Medications  amiodarone, 200 mg, Oral, Daily  apixaban, 2.5 mg, Oral, BID  vitamin C, 250 mg, Oral, Daily  atorvastatin, 20 mg, Oral, Nightly  bumetanide, 2 mg, Oral, TID  cetaphil, , Topical, Q12H  clobetasol propionate, 1 Application, Topical,  Q12H  clopidogrel, 75 mg, Oral, Daily  epoetin vince/vince-epbx, 10,000 Units, Subcutaneous, Once per day on Monday Wednesday Friday  hydrALAZINE, 10 mg, Oral, TID  insulin lispro, 2-7 Units, Subcutaneous, 4x Daily AC & at Bedtime  [Held by provider] linagliptin, 5 mg, Oral, Daily  Menthol-Zinc Oxide, 1 Application, Topical, BID  metoprolol succinate XL, 25 mg, Oral, Daily  miconazole, 1 Application, Topical, Q12H  pantoprazole, 40 mg, Oral, Q AM  sodium chloride, 10 mL, Intravenous, Q12H  sodium chloride, 10 mL, Intravenous, Q12H  sodium chloride, 10 mL, Intravenous, Q12H  sodium hypochlorite, , Topical, BID  tamsulosin, 0.4 mg, Oral, Daily  vancomycin, 125 mg, Oral, Q6H      Infusions  Pharmacy Consult,       Diet  Diet: Regular/House, Cardiac, Diabetic; Healthy Heart (2-3 Na+); Consistent Carbohydrate; Fluid Consistency: Thin (IDDSI 0)       Assessment/Plan     Active Hospital Problems    Diagnosis  POA   • **Anemia [D64.9]  Yes   • Moderate protein-calorie malnutrition [E44.0]  Yes   • GI bleed [K92.2]  Yes   • Peritoneal dialysis status [Z99.2]  Not Applicable   • Hypoalbuminemia [E88.09]  Yes   • Acute blood loss anemia [D62]  Unknown   • Chronic heart failure with preserved ejection fraction (HFpEF) [I50.32]  Yes   • Unsteady gait when walking [R26.81]  Yes   • Nocturnal hypoxemia [G47.34]  Yes   • CKD (chronic kidney disease) stage 4, GFR 15-29 ml/min [N18.4]  Yes   • Persistent atrial fibrillation [I48.19]  Yes   • Atrial fibrillation, persistent [I48.19]  Yes   • Amputated toe of left foot [S98.132A]  Yes   • S/P CABG x 2 [Z95.1]  Not Applicable   • Amputated toe of right foot [S98.131A]  Yes   • H/O aortic valve replacement with porcine valve [Z95.3]  Not Applicable   • Type 2 diabetes mellitus with circulatory disorder, without long-term current use of insulin [E11.59]  Yes   • SHASTA (obstructive sleep apnea) [G47.33]  Yes   • Essential hypertension [I10]  Yes      Resolved Hospital Problems   No resolved  problems to display.       79 y.o. male admitted with Anemia.    Gross hematuria: Underwent CBI and antiplatelet/anticoagulation held.  Status post cystoscopy with TURBT 5/24.  2 areas were concerning for bleeding and body habitus also affected his procedure.  Pathology pending.  Underwent voiding trial and is requiring intermittent cath.  Plavix and Eliquis resumed.   Urology following, outpatient follow-up planned.  Heme positive stool: Status post EGD.  No acute bleeding was noted.  GI evaluated.  CKD 5/ESRD: Requiring HD.  Nephrology following.  C. difficile diarrhea: On vancomycin to complete course. Chronic intermittent constipation issues. Infectious disease evaluated.  Chronic A-fib/chronic diastolic heart failure/history porcine AVR/history of CABG: Adjust per nephrology needs.  Eliquis as above.  Hypertension: Will make additional hydralazine available as needed.  Continue to adjust per nephrology needs.  Diabetes: A1c 5.2.  SSI.  Tradjenta held currently.  Glipizide stopped.  PPX: See above  Disposition: SNF/pending placement    Expected Discharge Date: 5/31/2025; Expected Discharge Time:      Raheel Smith MD  [facility]  05/30/25  13:17 EDT    Dictated portions of note using Dragon dictation software.  Copied text in this note has been reviewed by me and remains accurate as of 05/30/25

## 2025-05-30 NOTE — PROGRESS NOTES
[facility] DAILY PROGRESS NOTE      Name: Rock Maciel Jr.  Age: 79 y.o.  Sex: male  :  1945  MRN: [id number]    Date: 2025             Subjective:  Interval History:   Doing well  CIC required     Objective:    Scheduled Meds:amiodarone, 200 mg, Oral, Daily  apixaban, 2.5 mg, Oral, BID  vitamin C, 250 mg, Oral, Daily  atorvastatin, 20 mg, Oral, Nightly  bumetanide, 2 mg, Oral, TID  cetaphil, , Topical, Q12H  clobetasol propionate, 1 Application, Topical, Q12H  clopidogrel, 75 mg, Oral, Daily  epoetin vince/vince-epbx, 10,000 Units, Subcutaneous, Once per day on   hydrALAZINE, 10 mg, Oral, TID  insulin lispro, 2-7 Units, Subcutaneous, 4x Daily AC & at Bedtime  [Held by provider] linagliptin, 5 mg, Oral, Daily  Menthol-Zinc Oxide, 1 Application, Topical, BID  metoprolol succinate XL, 25 mg, Oral, Daily  miconazole, 1 Application, Topical, Q12H  pantoprazole, 40 mg, Oral, Q AM  sevelamer, 800 mg, Oral, TID With Meals  sodium chloride, 10 mL, Intravenous, Q12H  sodium chloride, 10 mL, Intravenous, Q12H  sodium chloride, 10 mL, Intravenous, Q12H  sodium hypochlorite, , Topical, BID  tamsulosin, 0.4 mg, Oral, Daily  vancomycin, 125 mg, Oral, Q6H      Continuous Infusions:Pharmacy Consult,       PRN Meds:  acetaminophen **OR** acetaminophen **OR** acetaminophen    calcium carbonate    camphor-menthol    dextrose    dextrose    docusate sodium    glucagon (human recombinant)    heparin (porcine)    heparin    hydrOXYzine    lidocaine    melatonin    nitroglycerin    ondansetron ODT **OR** ondansetron    oxybutynin    oxyCODONE-acetaminophen    Pharmacy Consult    sodium chloride    sodium chloride    sodium chloride    sodium chloride    sodium chloride    sodium chloride    sodium chloride    Vital signs in last 24 hours:  Temp:  [97.7 °F (36.5 °C)-98.6 °F (37 °C)] 98.6 °F (37 °C)  Heart Rate:  [66-88] 88  Resp:  [16] 16  BP: (157-184)/(50-65)  "184/65    Intake/Output:    Intake/Output Summary (Last 24 hours) at 5/30/2025 0841  Last data filed at 5/30/2025 0600  Gross per 24 hour   Intake 180 ml   Output 2625 ml   Net -2445 ml       Exam:  BP (!) 184/65 (BP Location: Left arm, Patient Position: Lying)   Pulse 88   Temp 98.6 °F (37 °C) (Oral)   Resp 16   Ht 182.9 cm (72\")   Wt 117 kg (258 lb 2.5 oz)   SpO2 98%   BMI 35.01 kg/m²     No distress    Data Review:  All labs (24hrs):   Recent Results (from the past 24 hours)   POC Glucose Once    Collection Time: 05/29/25 10:35 AM    Specimen: Blood   Result Value Ref Range    Glucose 159 (H) 70 - 130 mg/dL   POC Glucose Once    Collection Time: 05/29/25  3:29 PM    Specimen: Blood   Result Value Ref Range    Glucose 136 (H) 70 - 130 mg/dL   POC Glucose Once    Collection Time: 05/29/25  8:15 PM    Specimen: Blood   Result Value Ref Range    Glucose 182 (H) 70 - 130 mg/dL   Renal Function Panel    Collection Time: 05/30/25  5:57 AM    Specimen: Blood   Result Value Ref Range    Glucose 112 (H) 65 - 99 mg/dL    BUN 18.0 8.0 - 23.0 mg/dL    Creatinine 2.43 (H) 0.76 - 1.27 mg/dL    Sodium 138 136 - 145 mmol/L    Potassium 4.1 3.5 - 5.2 mmol/L    Chloride 104 98 - 107 mmol/L    CO2 25.0 22.0 - 29.0 mmol/L    Calcium 8.6 8.6 - 10.5 mg/dL    Albumin 2.9 (L) 3.5 - 5.2 g/dL    Phosphorus 2.4 (L) 2.5 - 4.5 mg/dL    Anion Gap 9.0 5.0 - 15.0 mmol/L    BUN/Creatinine Ratio 7.4 7.0 - 25.0    eGFR 26.2 (L) >60.0 mL/min/1.73   Magnesium    Collection Time: 05/30/25  5:57 AM    Specimen: Blood   Result Value Ref Range    Magnesium 1.8 1.6 - 2.4 mg/dL   CBC (No Diff)    Collection Time: 05/30/25  5:57 AM    Specimen: Blood   Result Value Ref Range    WBC 7.81 3.40 - 10.80 10*3/mm3    RBC 3.16 (L) 4.14 - 5.80 10*6/mm3    Hemoglobin 9.5 (L) 13.0 - 17.7 g/dL    Hematocrit 30.5 (L) 37.5 - 51.0 %    MCV 96.5 79.0 - 97.0 fL    MCH 30.1 26.6 - 33.0 pg    MCHC 31.1 (L) 31.5 - 35.7 g/dL    RDW 15.2 12.3 - 15.4 %    RDW-SD 53.4 37.0 " - 54.0 fl    MPV 8.8 6.0 - 12.0 fL    Platelets 204 140 - 450 10*3/mm3   POC Glucose Once    Collection Time: 05/30/25  6:29 AM    Specimen: Blood   Result Value Ref Range    Glucose 121 70 - 130 mg/dL          Assessment:    Anemia    Essential hypertension    Type 2 diabetes mellitus with circulatory disorder, without long-term current use of insulin    SHASTA (obstructive sleep apnea)    H/O aortic valve replacement with porcine valve    Amputated toe of right foot    S/P CABG x 2    Amputated toe of left foot    Atrial fibrillation, persistent    Persistent atrial fibrillation    CKD (chronic kidney disease) stage 4, GFR 15-29 ml/min    Nocturnal hypoxemia    Unsteady gait when walking    Chronic heart failure with preserved ejection fraction (HFpEF)    GI bleed    Peritoneal dialysis status    Hypoalbuminemia    Moderate protein-calorie malnutrition    Acute blood loss anemia      Gross hematuria with clot retention.  Bladder mass excision path to be followed in clinic      Plan:  Continue supportive care.   Straight cath as needed.    To rehab at any time.    They can do intermittent cath at rehab.  Follow-up with Dr. Ronny Gutierrez ([facility]) 1-2 weeks after Discharge - Schedulers messaged.  Urology will sign off; please call with any questions/concerns or clinical change         Jose Dailey MD  5/30/2025  08:41 EDT

## 2025-05-30 NOTE — PROGRESS NOTES
"      FOLLOW UP NOTE      Name: Rock Maciel Jr. ADMIT: 2025   : 1945  PCP: Denise Dickson APRN    MRN: [id number] LOS: 9 days   AGE/SEX: 79 y.o. male  ROOM: [facility]     Date of Service: 2025                           CHIEF COMPLIANTS / REASON FOR FOLLOW UP          KILLIAN/CKD NXT stage 5 days/week    Subjective:        Resting in bed.  Tolerated HD yesterday with 2 L UF.  Continues to require CIC's.    Review of Systems:       Negative except as above       OBJECTIVE                                                                        Exam:  BP (!) 184/65 (BP Location: Left arm, Patient Position: Lying)   Pulse 88   Temp 98.6 °F (37 °C) (Oral)   Resp 16   Ht 182.9 cm (72\")   Wt 117 kg (258 lb 2.5 oz)   SpO2 98%   BMI 35.01 kg/m²   Intake/Output last 3 shifts:  I/O last 3 completed shifts:  In: 180 [P.O.:180]  Out: 3075 [Urine:1075]  Intake/Output this shift:  No intake/output data recorded.    GEN: Pleasant chronically ill elderly gentleman in no acute distress  RESP:Faint crackles heard  CVS: RRR, S1, S2+  ABD: soft, nontender, nondistended  NEURO: AAOX3  EXT: Minimal lower extremity edema  ACCESS: RIJ TDC      Scheduled Meds:amiodarone, 200 mg, Oral, Daily  apixaban, 2.5 mg, Oral, BID  vitamin C, 250 mg, Oral, Daily  atorvastatin, 20 mg, Oral, Nightly  bumetanide, 2 mg, Oral, TID  cetaphil, , Topical, Q12H  clobetasol propionate, 1 Application, Topical, Q12H  clopidogrel, 75 mg, Oral, Daily  epoetin vince/vince-epbx, 10,000 Units, Subcutaneous, Once per day on   hydrALAZINE, 10 mg, Oral, TID  insulin lispro, 2-7 Units, Subcutaneous, 4x Daily AC & at Bedtime  [Held by provider] linagliptin, 5 mg, Oral, Daily  Menthol-Zinc Oxide, 1 Application, Topical, BID  metoprolol succinate XL, 25 mg, Oral, Daily  miconazole, 1 Application, Topical, Q12H  pantoprazole, 40 mg, Oral, Q AM  sevelamer, 800 mg, Oral, TID With Meals  sodium chloride, 10 mL, Intravenous, Q12H  sodium " chloride, 10 mL, Intravenous, Q12H  sodium chloride, 10 mL, Intravenous, Q12H  sodium hypochlorite, , Topical, BID  tamsulosin, 0.4 mg, Oral, Daily  vancomycin, 125 mg, Oral, Q6H      Continuous Infusions:Pharmacy Consult,       PRN Meds:  acetaminophen **OR** acetaminophen **OR** acetaminophen    calcium carbonate    camphor-menthol    dextrose    dextrose    docusate sodium    glucagon (human recombinant)    heparin (porcine)    heparin    hydrOXYzine    lidocaine    melatonin    nitroglycerin    ondansetron ODT **OR** ondansetron    oxybutynin    oxyCODONE-acetaminophen    Pharmacy Consult    sodium chloride    sodium chloride    sodium chloride    sodium chloride    sodium chloride    sodium chloride    sodium chloride         Data Review:                                                                           Labs reviewed        Imaging:                                                                           Radiology reviewed           ASSESSMENT:                                                                                Anemia    Essential hypertension    Type 2 diabetes mellitus with circulatory disorder, without long-term current use of insulin    SHASTA (obstructive sleep apnea)    H/O aortic valve replacement with porcine valve    Amputated toe of right foot    S/P CABG x 2    Amputated toe of left foot    Atrial fibrillation, persistent    Persistent atrial fibrillation    CKD (chronic kidney disease) stage 4, GFR 15-29 ml/min    Nocturnal hypoxemia    Unsteady gait when walking    Chronic heart failure with preserved ejection fraction (HFpEF)    GI bleed    Peritoneal dialysis status    Hypoalbuminemia    Moderate protein-calorie malnutrition    Acute blood loss anemia     CKD with hospitalization in March 2025 requiring HD due to KILLIAN likely ATN 2/2 rhabdomyolysis, prerenal insult related to diuretics. OM, now on next stage HD 5 days per week (MTWThF) with RIJ TDC.  On HD since 3/12.  Acute TLYER,  hematuria  Urinary retention  Bladder mass s/p resection  A-fib with controlled rates.  History of HFpEF with pulmonary hypertension  Recent osteomyelitis and abscess right foot  T2DM  HTN  A-fib  CAD  PVD   TTE 5/23/25 with EF 56-60%, mild transvalvular regurgitation in prosthetic AV, RVSP >55 mmHg      PLAN:                                                                            HD completed yesterday with 2 L UF.  Plan for HD again tomorrow per TTS schedule.  Gross hematuria managed by urology.  Requiring CIC's.  Continue with diuretics.  Continue antihypertensives.  Discontinue phosphate binder.  Continue EPO with HD.  Status post EGD, GI input noted  On p.o. vancomycin for C. difficile.    Awaiting rehab     Tiesha Mccrary MD  [facility]  5/30/2025  08:55 EDT

## 2025-05-30 NOTE — PROGRESS NOTES
Infectious Diseases Progress Note    Nicolasa Denny MD     [facility]  Los: 9 days  Patient Identification:  Name: Rock Maciel Jr.  Age: 79 y.o.  Sex: male  :  1945  MRN: [id number]         Primary Care Physician: Denise Dickson, ZEINA        Subjective: Lots of questions about management of urinary retention but denies any fever chills or diarrhea.  Tolerating oral vancomycin without any problem.  Interval History: See consultation note.    Objective:    Scheduled Meds:amiodarone, 200 mg, Oral, Daily  apixaban, 2.5 mg, Oral, BID  vitamin C, 250 mg, Oral, Daily  atorvastatin, 20 mg, Oral, Nightly  bumetanide, 2 mg, Oral, TID  cetaphil, , Topical, Q12H  clobetasol propionate, 1 Application, Topical, Q12H  clopidogrel, 75 mg, Oral, Daily  epoetin vince/vince-epbx, 10,000 Units, Subcutaneous, Once per day on   hydrALAZINE, 10 mg, Oral, TID  insulin lispro, 2-7 Units, Subcutaneous, 4x Daily AC & at Bedtime  [Held by provider] linagliptin, 5 mg, Oral, Daily  Menthol-Zinc Oxide, 1 Application, Topical, BID  metoprolol succinate XL, 25 mg, Oral, Daily  miconazole, 1 Application, Topical, Q12H  pantoprazole, 40 mg, Oral, Q AM  sevelamer, 800 mg, Oral, TID With Meals  sodium chloride, 10 mL, Intravenous, Q12H  sodium chloride, 10 mL, Intravenous, Q12H  sodium chloride, 10 mL, Intravenous, Q12H  sodium hypochlorite, , Topical, BID  tamsulosin, 0.4 mg, Oral, Daily  vancomycin, 125 mg, Oral, Q6H      Continuous Infusions:Pharmacy Consult,         Vital signs in last 24 hours:  Temp:  [97.7 °F (36.5 °C)] 97.7 °F (36.5 °C)  Heart Rate:  [66-67] 66  Resp:  [16] 16  BP: (157-161)/(50-54) 157/54    Intake/Output:    Intake/Output Summary (Last 24 hours) at 2025 0712  Last data filed at 2025 0600  Gross per 24 hour   Intake 180 ml   Output 2625 ml   Net -2445 ml       Exam:  /54 (BP Location: Left arm, Patient Position: Lying)   Pulse 66   Temp 97.7 °F (36.5 °C)  "(Oral)   Resp 16   Ht 182.9 cm (72\")   Wt 117 kg (258 lb 2.5 oz)   SpO2 94%   BMI 35.01 kg/m²   Patient is examined using the personal protective equipment as per guidelines from infection control for this particular patient as enacted.  Hand washing was performed before and after patient interaction.  General Appearance:  Alert and oriented x 3                          Head:    Normocephalic, without obvious abnormality, atraumatic                           Eyes:    PERRL, conjunctivae/corneas clear, EOM's intact, both eyes                         Throat:   Lips, tongue, gums normal; oral mucosa pink and moist                           Neck:   Supple, symmetrical, trachea midline, no JVD                         Lungs:    Clear to auscultation bilaterally, respirations unlabored                 Chest Wall:    No tenderness or deformity                          Heart:  S1-S2 regular                  Abdomen:   Soft nontender three-way catheter in place                 Extremities: No surrounding inflammation.                              Skin: Skin changes due to decubital process noted.                  Neurologic: Alert and oriented x 3       Data Review:    I reviewed the patient's new clinical results.  Results from last 7 days   Lab Units 05/30/25  0557 05/29/25  0551 05/28/25  0628 05/27/25  0656 05/26/25  0540 05/25/25  0510 05/24/25  0749   WBC 10*3/mm3 7.81 7.25 7.01 8.02 9.58 13.85* 7.68   HEMOGLOBIN g/dL 9.5* 9.0* 8.6* 8.2* 8.8* 10.1* 8.9*   PLATELETS 10*3/mm3 204 200 191 222 219 229 208     Results from last 7 days   Lab Units 05/30/25  0557 05/29/25  0551 05/28/25  0628 05/27/25  0656 05/26/25  0540 05/25/25  0510 05/24/25  0749   SODIUM mmol/L 138 134* 134* 135* 137 136 135*   POTASSIUM mmol/L 4.1 4.1 4.0 4.6 4.2 4.5 4.3   CHLORIDE mmol/L 104 102 100 103 103 104 99   CO2 mmol/L 25.0 24.0 24.3 24.0 24.4 25.9 27.9   BUN mg/dL 18.0 28.0* 23.0 39* 32* 24* 30*   CREATININE mg/dL 2.43* 3.20* 2.60* 4.39* " 3.64* 2.74* 3.22*   CALCIUM mg/dL 8.6 8.4* 8.2* 8.3* 8.4* 8.6 8.4*   GLUCOSE mg/dL 112* 104* 82 78 70 69 107*     Microbiology Results (last 10 days)       Procedure Component Value - Date/Time    Clostridioides difficile Toxin - Stool, Per Rectum [[id number]]  (Abnormal) Collected: 05/25/25 0427    Lab Status: Final result Specimen: Stool from Per Rectum Updated: 05/25/25 0722    Narrative:      The following orders were created for panel order Clostridioides difficile Toxin - Stool, Per Rectum.  Procedure                               Abnormality         Status                     ---------                               -----------         ------                     Clostridioides difficile...[[id number]]  Abnormal            Final result                 Please view results for these tests on the individual orders.    Clostridioides difficile Toxin, PCR - Stool, Per Rectum [[id number]]  (Abnormal) Collected: 05/25/25 0427    Lab Status: Final result Specimen: Stool from Per Rectum Updated: 05/25/25 0722     Toxigenic C. difficile by PCR Positive    Narrative:      DNA from a toxigenic strain of C.difficile has been detected. Antigen testing for the presence of free C.difficile toxin is currently in progress, to help determine the clinical significance of this PCR result.     Clostridioides difficile toxin Ag, Reflex - Stool, Per Rectum [[id number]]  (Normal) Collected: 05/25/25 0427    Lab Status: Final result Specimen: Stool from Per Rectum Updated: 05/25/25 0852     C.diff Toxin Ag Negative    Narrative:      DNA from a toxigenic strain of C.difficile was detected, although the free toxin itself was not detected. These findings are consistent with C.difficile colonization and may not reflect actual C.difficile infection. Clinical correlation needed.    Urine Culture - Urine, Urine, Random Void [[id number]]  (Normal) Collected: 05/22/25 1035    Lab Status: Final result Specimen: Urine, Random Void Updated: 05/23/25  1101     Urine Culture No growth    CANDIDA AURIS PCR - Swab, Axilla Right, Axilla Left and Groin [[id number]]  (Normal) Collected: 05/21/25 1812    Lab Status: Final result Specimen: Swab from Axilla Right, Axilla Left and Groin Updated: 05/23/25 1205     JUDI AURIS PCR Not Detected                Assessment:    Anemia    Essential hypertension    Type 2 diabetes mellitus with circulatory disorder, without long-term current use of insulin    SHASTA (obstructive sleep apnea)    H/O aortic valve replacement with porcine valve    Amputated toe of right foot    S/P CABG x 2    Amputated toe of left foot    Atrial fibrillation, persistent    Persistent atrial fibrillation    CKD (chronic kidney disease) stage 4, GFR 15-29 ml/min    Nocturnal hypoxemia    Unsteady gait when walking    Chronic heart failure with preserved ejection fraction (HFpEF)    GI bleed    Peritoneal dialysis status    Hypoalbuminemia    Moderate protein-calorie malnutrition    Acute blood loss anemia    79-year-old male with  1-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  Patient currently seems to be stable and does not have evidence of active right foot infection or systemic symptoms.  He does have open wound on the right foot that would require care.  2-severe anemia multifactorial management per primary team  3-immobility with evolving decubitus skin changes  4-end-stage renal disease on dialysis per nephrology service via right IJ tunnel catheter  5-immobilization syndrome #6-diabetes  7-peripheral vascular disease  8-C. difficile diarrhea with colonization.        Recommendations/Discussions:  See my discussion and recommendations on  5/23/2025.  Continue with local wound care as clinically there is no evidence of active infection of the foot.  Continue follow-up as per podiatry recommendations.  Supportive care and management of other issues per primary team.  Continue oral vancomycin for 10 days.  Avoid broad-spectrum antibiotic therapy if possible.  Nicolasa Denny MD  5/30/2025  07:12 EDT    Parts of this note may be an electronic transcription/translation of spoken language to printed text using the Dragon dictation system.

## 2025-05-31 LAB
ALBUMIN SERPL-MCNC: 2.9 G/DL (ref 3.5–5.2)
ANION GAP SERPL CALCULATED.3IONS-SCNC: 11 MMOL/L (ref 5–15)
BUN SERPL-MCNC: 24 MG/DL (ref 8–23)
BUN/CREAT SERPL: 8.2 (ref 7–25)
CALCIUM SPEC-SCNC: 8.7 MG/DL (ref 8.6–10.5)
CHLORIDE SERPL-SCNC: 102 MMOL/L (ref 98–107)
CO2 SERPL-SCNC: 23 MMOL/L (ref 22–29)
CREAT SERPL-MCNC: 2.91 MG/DL (ref 0.76–1.27)
DEPRECATED RDW RBC AUTO: 51.7 FL (ref 37–54)
EGFRCR SERPLBLD CKD-EPI 2021: 21.1 ML/MIN/1.73
ERYTHROCYTE [DISTWIDTH] IN BLOOD BY AUTOMATED COUNT: 15.1 % (ref 12.3–15.4)
GLUCOSE BLDC GLUCOMTR-MCNC: 102 MG/DL (ref 70–130)
GLUCOSE BLDC GLUCOMTR-MCNC: 128 MG/DL (ref 70–130)
GLUCOSE BLDC GLUCOMTR-MCNC: 133 MG/DL (ref 70–130)
GLUCOSE BLDC GLUCOMTR-MCNC: 176 MG/DL (ref 70–130)
GLUCOSE SERPL-MCNC: 124 MG/DL (ref 65–99)
HCT VFR BLD AUTO: 30.2 % (ref 37.5–51)
HGB BLD-MCNC: 9.9 G/DL (ref 13–17.7)
MAGNESIUM SERPL-MCNC: 1.9 MG/DL (ref 1.6–2.4)
MCH RBC QN AUTO: 30.9 PG (ref 26.6–33)
MCHC RBC AUTO-ENTMCNC: 32.8 G/DL (ref 31.5–35.7)
MCV RBC AUTO: 94.4 FL (ref 79–97)
PHOSPHATE SERPL-MCNC: 3.1 MG/DL (ref 2.5–4.5)
PLATELET # BLD AUTO: 231 10*3/MM3 (ref 140–450)
PMV BLD AUTO: 8.7 FL (ref 6–12)
POTASSIUM SERPL-SCNC: 4.1 MMOL/L (ref 3.5–5.2)
RBC # BLD AUTO: 3.2 10*6/MM3 (ref 4.14–5.8)
SODIUM SERPL-SCNC: 136 MMOL/L (ref 136–145)
WBC NRBC COR # BLD AUTO: 8.29 10*3/MM3 (ref 3.4–10.8)

## 2025-05-31 PROCEDURE — 83735 ASSAY OF MAGNESIUM: CPT | Performed by: INTERNAL MEDICINE

## 2025-05-31 PROCEDURE — 82948 REAGENT STRIP/BLOOD GLUCOSE: CPT

## 2025-05-31 PROCEDURE — 80069 RENAL FUNCTION PANEL: CPT | Performed by: INTERNAL MEDICINE

## 2025-05-31 PROCEDURE — 85027 COMPLETE CBC AUTOMATED: CPT | Performed by: INTERNAL MEDICINE

## 2025-05-31 PROCEDURE — 63710000001 INSULIN LISPRO (HUMAN) PER 5 UNITS: Performed by: UROLOGY

## 2025-05-31 RX ADMIN — DAKIN'S SOLUTION 0.125% (QUARTER STRENGTH): 0.12 SOLUTION at 11:32

## 2025-05-31 RX ADMIN — Medication 10 ML: at 22:29

## 2025-05-31 RX ADMIN — HYDRALAZINE HYDROCHLORIDE 10 MG: 10 TABLET ORAL at 11:30

## 2025-05-31 RX ADMIN — Medication 10 ML: at 11:32

## 2025-05-31 RX ADMIN — APIXABAN 2.5 MG: 2.5 TABLET, FILM COATED ORAL at 11:30

## 2025-05-31 RX ADMIN — INSULIN LISPRO 2 UNITS: 100 INJECTION, SOLUTION INTRAVENOUS; SUBCUTANEOUS at 17:11

## 2025-05-31 RX ADMIN — Medication 5 MG: at 22:29

## 2025-05-31 RX ADMIN — AMIODARONE HYDROCHLORIDE 200 MG: 200 TABLET ORAL at 11:30

## 2025-05-31 RX ADMIN — VANCOMYCIN HYDROCHLORIDE 125 MG: 125 CAPSULE ORAL at 23:18

## 2025-05-31 RX ADMIN — OXYCODONE HYDROCHLORIDE AND ACETAMINOPHEN 250 MG: 500 TABLET ORAL at 11:30

## 2025-05-31 RX ADMIN — CLOBETASOL PROPIONATE CREAM USP, 0.05% 1 APPLICATION: 0.5 CREAM TOPICAL at 22:58

## 2025-05-31 RX ADMIN — PANTOPRAZOLE SODIUM 40 MG: 40 TABLET, DELAYED RELEASE ORAL at 05:59

## 2025-05-31 RX ADMIN — ANTI-FUNGAL POWDER MICONAZOLE NITRATE TALC FREE 1 APPLICATION: 1.42 POWDER TOPICAL at 11:31

## 2025-05-31 RX ADMIN — ANTI-FUNGAL POWDER MICONAZOLE NITRATE TALC FREE 1 APPLICATION: 1.42 POWDER TOPICAL at 22:58

## 2025-05-31 RX ADMIN — VANCOMYCIN HYDROCHLORIDE 125 MG: 125 CAPSULE ORAL at 17:10

## 2025-05-31 RX ADMIN — HYDROXYZINE HYDROCHLORIDE 25 MG: 25 TABLET, FILM COATED ORAL at 23:18

## 2025-05-31 RX ADMIN — VANCOMYCIN HYDROCHLORIDE 125 MG: 125 CAPSULE ORAL at 11:30

## 2025-05-31 RX ADMIN — Medication: at 22:58

## 2025-05-31 RX ADMIN — CLOPIDOGREL BISULFATE 75 MG: 75 TABLET, FILM COATED ORAL at 11:30

## 2025-05-31 RX ADMIN — HYDRALAZINE HYDROCHLORIDE 10 MG: 10 TABLET ORAL at 22:30

## 2025-05-31 RX ADMIN — BUMETANIDE 2 MG: 1 TABLET ORAL at 11:30

## 2025-05-31 RX ADMIN — MENTHOL, ZINC OXIDE 1 APPLICATION: .44; 20.6 OINTMENT TOPICAL at 22:58

## 2025-05-31 RX ADMIN — Medication 10 ML: at 11:31

## 2025-05-31 RX ADMIN — CLOBETASOL PROPIONATE CREAM USP, 0.05% 1 APPLICATION: 0.5 CREAM TOPICAL at 11:31

## 2025-05-31 RX ADMIN — METOPROLOL SUCCINATE 25 MG: 25 TABLET, EXTENDED RELEASE ORAL at 11:30

## 2025-05-31 RX ADMIN — BUMETANIDE 2 MG: 1 TABLET ORAL at 05:59

## 2025-05-31 RX ADMIN — HYDRALAZINE HYDROCHLORIDE 10 MG: 10 TABLET ORAL at 17:10

## 2025-05-31 RX ADMIN — MENTHOL, ZINC OXIDE 1 APPLICATION: .44; 20.6 OINTMENT TOPICAL at 11:31

## 2025-05-31 RX ADMIN — BUMETANIDE 2 MG: 1 TABLET ORAL at 17:10

## 2025-05-31 RX ADMIN — TAMSULOSIN HYDROCHLORIDE 0.4 MG: 0.4 CAPSULE ORAL at 11:30

## 2025-05-31 RX ADMIN — Medication: at 11:31

## 2025-05-31 RX ADMIN — DAKIN'S SOLUTION 0.125% (QUARTER STRENGTH): 0.12 SOLUTION at 23:19

## 2025-05-31 RX ADMIN — ATORVASTATIN CALCIUM 20 MG: 20 TABLET, FILM COATED ORAL at 22:29

## 2025-05-31 RX ADMIN — VANCOMYCIN HYDROCHLORIDE 125 MG: 125 CAPSULE ORAL at 05:59

## 2025-05-31 RX ADMIN — APIXABAN 2.5 MG: 2.5 TABLET, FILM COATED ORAL at 22:29

## 2025-05-31 NOTE — PROGRESS NOTES
"      FOLLOW UP NOTE      Name: Rock Maciel Jr. ADMIT: 2025   : 1945  PCP: Denise Dickson APRN    MRN: [id number] LOS: 10 days   AGE/SEX: 79 y.o. male  ROOM: 57     Date of Service: 2025                           CHIEF COMPLIANTS / REASON FOR FOLLOW UP          KILLIAN/CKD NXT stage 5 days/week    Subjective:      Seen and examined  Seen on HD  No new issues    Review of Systems:       Negative except as above       OBJECTIVE                                                                        Exam:  /77 (BP Location: Left arm, Patient Position: Lying)   Pulse 78   Temp 97.2 °F (36.2 °C) (Oral)   Resp 16   Ht 182.9 cm (72\")   Wt 117 kg (258 lb 13.1 oz)   SpO2 94%   BMI 35.10 kg/m²   Intake/Output last 3 shifts:  I/O last 3 completed shifts:  In: 180 [P.O.:180]  Out: 1100 [Urine:1100]  Intake/Output this shift:  No intake/output data recorded.    GEN: Pleasant chronically ill elderly gentleman in no acute distress  RESP:Faint crackles heard  CVS: RRR, S1, S2+  ABD: soft, nontender, nondistended  NEURO: AAOX3  EXT: Minimal lower extremity edema  ACCESS: RIJ TDC      Scheduled Meds:amiodarone, 200 mg, Oral, Daily  apixaban, 2.5 mg, Oral, BID  vitamin C, 250 mg, Oral, Daily  atorvastatin, 20 mg, Oral, Nightly  bumetanide, 2 mg, Oral, TID  cetaphil, , Topical, Q12H  clobetasol propionate, 1 Application, Topical, Q12H  clopidogrel, 75 mg, Oral, Daily  epoetin vince/vince-epbx, 10,000 Units, Subcutaneous, Once per day on   hydrALAZINE, 10 mg, Oral, TID  insulin lispro, 2-7 Units, Subcutaneous, 4x Daily AC & at Bedtime  [Held by provider] linagliptin, 5 mg, Oral, Daily  Menthol-Zinc Oxide, 1 Application, Topical, BID  metoprolol succinate XL, 25 mg, Oral, Daily  miconazole, 1 Application, Topical, Q12H  pantoprazole, 40 mg, Oral, Q AM  sodium chloride, 10 mL, Intravenous, Q12H  sodium chloride, 10 mL, Intravenous, Q12H  sodium chloride, 10 mL, Intravenous, " Q12H  sodium hypochlorite, , Topical, BID  tamsulosin, 0.4 mg, Oral, Daily  vancomycin, 125 mg, Oral, Q6H      Continuous Infusions:Pharmacy Consult,       PRN Meds:  acetaminophen **OR** acetaminophen **OR** acetaminophen    calcium carbonate    camphor-menthol    dextrose    dextrose    docusate sodium    glucagon (human recombinant)    heparin (porcine)    heparin    hydrALAZINE    hydrOXYzine    lidocaine    melatonin    nitroglycerin    ondansetron ODT **OR** ondansetron    oxybutynin    oxyCODONE-acetaminophen    Pharmacy Consult    sodium chloride    sodium chloride    sodium chloride    sodium chloride    sodium chloride    sodium chloride    sodium chloride         Data Review:                                                                           Labs reviewed        Imaging:                                                                           Radiology reviewed           ASSESSMENT:                                                                                Anemia    Essential hypertension    Type 2 diabetes mellitus with circulatory disorder, without long-term current use of insulin    SHASTA (obstructive sleep apnea)    H/O aortic valve replacement with porcine valve    Amputated toe of right foot    S/P CABG x 2    Amputated toe of left foot    Atrial fibrillation, persistent    Persistent atrial fibrillation    CKD (chronic kidney disease) stage 4, GFR 15-29 ml/min    Nocturnal hypoxemia    Unsteady gait when walking    Chronic heart failure with preserved ejection fraction (HFpEF)    GI bleed    Peritoneal dialysis status    Hypoalbuminemia    Moderate protein-calorie malnutrition    Acute blood loss anemia     CKD with hospitalization in March 2025 requiring HD due to KILLIAN likely ATN 2/2 rhabdomyolysis, prerenal insult related to diuretics. OM, now on next stage HD 5 days per week (MTWThF) with THOR TDC.  On HD since 3/12.  Acute TYLER, hematuria  Urinary retention  Bladder mass s/p  resection  A-fib with controlled rates.  History of HFpEF with pulmonary hypertension  Recent osteomyelitis and abscess right foot  T2DM  HTN  A-fib  CAD  PVD   TTE 5/23/25 with EF 56-60%, mild transvalvular regurgitation in prosthetic AV, RVSP >55 mmHg      PLAN:                                                                            HD completed 5/29 with 2 L UF.  Seen on HD today as per TTS schedule.  Gross hematuria managed by urology.  Requiring CIC's.  Continue with diuretics.  Continue antihypertensives.  Discontinue phosphate binder.  Continue EPO with HD.  Status post EGD, GI input noted  On p.o. vancomycin for C. difficile.    Awaiting rehab     ZEINA [facility]  5/31/2025  13:14 EDT     The note was transcribed by  ZEINA.  I personally have examined the patient and interviewed the patient and modified the history, exam. I have reviewed the history, data, problems, assessment and plan .and I concur . Exam as described in the Progress note, with comments additions, revisions as noted by me.       MD Cynthia [facility]    5/31/2025  18:04 EDT

## 2025-05-31 NOTE — PLAN OF CARE
Goal Outcome Evaluation:      Pt is A&Ox4; refusing Q2 turns; Dialysis completed; creams applied to skin and dressing change to foot; no complaints of pain or SOA; medicated per orders; plan of care on going.

## 2025-05-31 NOTE — PROGRESS NOTES
Infectious Diseases Progress Note    Nicolasa Denny MD     [facility]  Los: 10 days  Patient Identification:  Name: Rock Maciel Jr.  Age: 79 y.o.  Sex: male  :  1945  MRN: [id number]         Primary Care Physician: Denise Dickson, ZEINA        Subjective: Denies any complaints.  Denies any fever and chills.  Tolerating oral vancomycin without any problem.  Denies any diarrhea.  Denies any discomfort or complaints about his right foot.  Interval History: See consultation note.    Objective:    Scheduled Meds:amiodarone, 200 mg, Oral, Daily  apixaban, 2.5 mg, Oral, BID  vitamin C, 250 mg, Oral, Daily  atorvastatin, 20 mg, Oral, Nightly  bumetanide, 2 mg, Oral, TID  cetaphil, , Topical, Q12H  clobetasol propionate, 1 Application, Topical, Q12H  clopidogrel, 75 mg, Oral, Daily  epoetin vince/vince-epbx, 10,000 Units, Subcutaneous, Once per day on   hydrALAZINE, 10 mg, Oral, TID  insulin lispro, 2-7 Units, Subcutaneous, 4x Daily AC & at Bedtime  [Held by provider] linagliptin, 5 mg, Oral, Daily  Menthol-Zinc Oxide, 1 Application, Topical, BID  metoprolol succinate XL, 25 mg, Oral, Daily  miconazole, 1 Application, Topical, Q12H  pantoprazole, 40 mg, Oral, Q AM  sodium chloride, 10 mL, Intravenous, Q12H  sodium chloride, 10 mL, Intravenous, Q12H  sodium chloride, 10 mL, Intravenous, Q12H  sodium hypochlorite, , Topical, BID  tamsulosin, 0.4 mg, Oral, Daily  vancomycin, 125 mg, Oral, Q6H      Continuous Infusions:Pharmacy Consult,         Vital signs in last 24 hours:  Temp:  [97.2 °F (36.2 °C)-98.8 °F (37.1 °C)] 98 °F (36.7 °C)  Heart Rate:  [77-86] 86  Resp:  [16] 16  BP: (167-175)/(63-80) 167/80    Intake/Output:    Intake/Output Summary (Last 24 hours) at 2025 1614  Last data filed at 2025 1418  Gross per 24 hour   Intake 200 ml   Output 1100 ml   Net -900 ml       Exam:  /80 (BP Location: Left arm, Patient Position: Lying)   Pulse 86   Temp 98 °F  "(36.7 °C) (Oral)   Resp 16   Ht 182.9 cm (72\")   Wt 117 kg (258 lb 13.1 oz)   SpO2 96%   BMI 35.10 kg/m²   Patient is examined using the personal protective equipment as per guidelines from infection control for this particular patient as enacted.  Hand washing was performed before and after patient interaction.  General Appearance:  Alert and oriented x 3                          Head:    Normocephalic, without obvious abnormality, atraumatic                           Eyes:    PERRL, conjunctivae/corneas clear, EOM's intact, both eyes                         Throat:   Lips, tongue, gums normal; oral mucosa pink and moist                           Neck:   Supple, symmetrical, trachea midline, no JVD                         Lungs:    Clear to auscultation bilaterally, respirations unlabored                 Chest Wall:    No tenderness or deformity                          Heart:  S1-S2 regular                  Abdomen:   Soft nontender three-way catheter in place                 Extremities: No surrounding inflammation.                              Skin: Skin changes due to decubital process noted.                  Neurologic: Alert and oriented x 3       Data Review:    I reviewed the patient's new clinical results.  Results from last 7 days   Lab Units 05/31/25  0515 05/30/25  0557 05/29/25  0551 05/28/25  0628 05/27/25  0656 05/26/25  0540 05/25/25  0510   WBC 10*3/mm3 8.29 7.81 7.25 7.01 8.02 9.58 13.85*   HEMOGLOBIN g/dL 9.9* 9.5* 9.0* 8.6* 8.2* 8.8* 10.1*   PLATELETS 10*3/mm3 231 204 200 191 222 219 229     Results from last 7 days   Lab Units 05/31/25  0515 05/30/25  0557 05/29/25  0551 05/28/25  0628 05/27/25  0656 05/26/25  0540 05/25/25  0510   SODIUM mmol/L 136 138 134* 134* 135* 137 136   POTASSIUM mmol/L 4.1 4.1 4.1 4.0 4.6 4.2 4.5   CHLORIDE mmol/L 102 104 102 100 103 103 104   CO2 mmol/L 23.0 25.0 24.0 24.3 24.0 24.4 25.9   BUN mg/dL 24.0* 18.0 28.0* 23.0 39* 32* 24*   CREATININE mg/dL 2.91* 2.43* " 3.20* 2.60* 4.39* 3.64* 2.74*   CALCIUM mg/dL 8.7 8.6 8.4* 8.2* 8.3* 8.4* 8.6   GLUCOSE mg/dL 124* 112* 104* 82 78 70 69     Microbiology Results (last 10 days)       Procedure Component Value - Date/Time    Clostridioides difficile Toxin - Stool, Per Rectum [[id number]]  (Abnormal) Collected: 05/25/25 0427    Lab Status: Final result Specimen: Stool from Per Rectum Updated: 05/25/25 0722    Narrative:      The following orders were created for panel order Clostridioides difficile Toxin - Stool, Per Rectum.  Procedure                               Abnormality         Status                     ---------                               -----------         ------                     Clostridioides difficile...[[id number]]  Abnormal            Final result                 Please view results for these tests on the individual orders.    Clostridioides difficile Toxin, PCR - Stool, Per Rectum [[id number]]  (Abnormal) Collected: 05/25/25 0427    Lab Status: Final result Specimen: Stool from Per Rectum Updated: 05/25/25 0722     Toxigenic C. difficile by PCR Positive    Narrative:      DNA from a toxigenic strain of C.difficile has been detected. Antigen testing for the presence of free C.difficile toxin is currently in progress, to help determine the clinical significance of this PCR result.     Clostridioides difficile toxin Ag, Reflex - Stool, Per Rectum [[id number]]  (Normal) Collected: 05/25/25 0427    Lab Status: Final result Specimen: Stool from Per Rectum Updated: 05/25/25 0852     C.diff Toxin Ag Negative    Narrative:      DNA from a toxigenic strain of C.difficile was detected, although the free toxin itself was not detected. These findings are consistent with C.difficile colonization and may not reflect actual C.difficile infection. Clinical correlation needed.    Urine Culture - Urine, Urine, Random Void [[id number]]  (Normal) Collected: 05/22/25 1035    Lab Status: Final result Specimen: Urine, Random Void Updated:  05/23/25 1101     Urine Culture No growth    CANDIDA AURIS PCR - Swab, Axilla Right, Axilla Left and Groin [[id number]]  (Normal) Collected: 05/21/25 1812    Lab Status: Final result Specimen: Swab from Axilla Right, Axilla Left and Groin Updated: 05/23/25 1205     JUDI AURIS PCR Not Detected                Assessment:    Anemia    Essential hypertension    Type 2 diabetes mellitus with circulatory disorder, without long-term current use of insulin    SHASTA (obstructive sleep apnea)    H/O aortic valve replacement with porcine valve    Amputated toe of right foot    S/P CABG x 2    Amputated toe of left foot    Atrial fibrillation, persistent    Persistent atrial fibrillation    CKD (chronic kidney disease) stage 4, GFR 15-29 ml/min    Nocturnal hypoxemia    Unsteady gait when walking    Chronic heart failure with preserved ejection fraction (HFpEF)    GI bleed    Peritoneal dialysis status    Hypoalbuminemia    Moderate protein-calorie malnutrition    Acute blood loss anemia    79-year-old male with  1-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  Patient currently seems to be stable and does not have evidence of active right foot infection or systemic symptoms.  He does have open wound on the right foot that would require care.  2-severe anemia multifactorial management per primary team  3-immobility with evolving decubitus skin changes  4-end-stage renal disease on dialysis per nephrology service via right IJ tunnel catheter  5-immobilization syndrome #6-diabetes  7-peripheral vascular disease  8-C. difficile diarrhea with colonization.        Recommendations/Discussions:  See my discussion and recommendations on  5/23/2025.  Continue with local wound care as clinically there is no evidence of active infection of the foot.  Continue follow-up as per podiatry recommendations.  Supportive care and management of other issues per primary team.  Continue oral vancomycin for 10 days.  Avoid broad-spectrum antibiotic therapy if possible.  Nicolasa Denny MD  5/31/2025  16:14 EDT    Parts of this note may be an electronic transcription/translation of spoken language to printed text using the Dragon dictation system.

## 2025-05-31 NOTE — PROGRESS NOTES
Name: Rock Maciel Jr. ADMIT: 2025   : 1945  PCP: Denise Dickson APRN    MRN: [id number] LOS: 10 days   AGE/SEX: 79 y.o. male  ROOM: [facility]     Subjective   Subjective   Chief Complaint   Patient presents with    Abnormal Lab     Reports he is doing well today.  Not having any chest pain palpitations or shortness of breath.     Objective   Objective   Vital Signs  Temp:  [97.2 °F (36.2 °C)-98.8 °F (37.1 °C)] 97.2 °F (36.2 °C)  Heart Rate:  [77-78] 78  Resp:  [16] 16  BP: (171-175)/(63-77) 175/77  SpO2:  [92 %-94 %] 94 %  on  Flow (L/min) (Oxygen Therapy):  [2] 2;   Device (Oxygen Therapy): nasal cannula  Body mass index is 35.1 kg/m².    Physical Exam  Vitals and nursing note reviewed.   Constitutional:       General: He is not in acute distress.     Appearance: He is not diaphoretic.   Cardiovascular:      Rate and Rhythm: Normal rate and regular rhythm.   Pulmonary:      Effort: Pulmonary effort is normal.      Breath sounds: Decreased breath sounds present. No wheezing.   Abdominal:      Palpations: Abdomen is soft.      Tenderness: There is no abdominal tenderness.   Musculoskeletal:      Right lower leg: Edema present.      Left lower leg: Edema present.      Comments: trace   Skin:     General: Skin is dry.   Neurological:      Mental Status: He is alert. Mental status is at baseline.   Psychiatric:         Mood and Affect: Mood normal.         Behavior: Behavior normal.       Results Review  I reviewed the patient's new clinical results.    Results from last 7 days   Lab Units 25  0515 25  0557 25  0551 25  0628   WBC 10*3/mm3 8.29 7.81 7.25 7.01   HEMOGLOBIN g/dL 9.9* 9.5* 9.0* 8.6*   PLATELETS 10*3/mm3 231 204 200 191     Results from last 7 days   Lab Units 25  0515 25  0557 25  0551 25  0628   SODIUM mmol/L 136 138 134* 134*   POTASSIUM mmol/L 4.1 4.1 4.1 4.0   CHLORIDE mmol/L 102 104 102 100   CO2 mmol/L 23.0 25.0 24.0 24.3   BUN  mg/dL 24.0* 18.0 28.0* 23.0   CREATININE mg/dL 2.91* 2.43* 3.20* 2.60*   GLUCOSE mg/dL 124* 112* 104* 82   EGFR mL/min/1.73 21.1* 26.2* 18.8* 24.2*     Results from last 7 days   Lab Units 05/31/25  0515 05/30/25  0557 05/29/25  0551 05/28/25  0628 05/26/25  0540 05/25/25  0510   ALBUMIN g/dL 2.9* 2.9* 2.7* 2.7* 2.4* 2.7*   BILIRUBIN mg/dL  --   --   --   --  0.2 0.3   ALK PHOS U/L  --   --   --   --  89 91   AST (SGOT) U/L  --   --   --   --  13 14   ALT (SGPT) U/L  --   --   --   --  10 11     Results from last 7 days   Lab Units 05/31/25 0515 05/30/25  0557 05/29/25  0551 05/28/25  0628   CALCIUM mg/dL 8.7 8.6 8.4* 8.2*   ALBUMIN g/dL 2.9* 2.9* 2.7* 2.7*   MAGNESIUM mg/dL 1.9 1.8 2.0 2.0   PHOSPHORUS mg/dL 3.1 2.4* 3.2 3.1         Glucose   Date/Time Value Ref Range Status   05/31/2025 1118 102 70 - 130 mg/dL Final   05/31/2025 0640 128 70 - 130 mg/dL Final   05/30/2025 2050 139 (H) 70 - 130 mg/dL Final   05/30/2025 1537 143 (H) 70 - 130 mg/dL Final   05/30/2025 1031 173 (H) 70 - 130 mg/dL Final   05/30/2025 0629 121 70 - 130 mg/dL Final   05/29/2025 2015 182 (H) 70 - 130 mg/dL Final       No radiology results for the last day      I have personally reviewed all medications:  Scheduled Medications  amiodarone, 200 mg, Oral, Daily  apixaban, 2.5 mg, Oral, BID  vitamin C, 250 mg, Oral, Daily  atorvastatin, 20 mg, Oral, Nightly  bumetanide, 2 mg, Oral, TID  cetaphil, , Topical, Q12H  clobetasol propionate, 1 Application, Topical, Q12H  clopidogrel, 75 mg, Oral, Daily  epoetin vince/vince-epbx, 10,000 Units, Subcutaneous, Once per day on Monday Wednesday Friday  hydrALAZINE, 10 mg, Oral, TID  insulin lispro, 2-7 Units, Subcutaneous, 4x Daily AC & at Bedtime  [Held by provider] linagliptin, 5 mg, Oral, Daily  Menthol-Zinc Oxide, 1 Application, Topical, BID  metoprolol succinate XL, 25 mg, Oral, Daily  miconazole, 1 Application, Topical, Q12H  pantoprazole, 40 mg, Oral, Q AM  sodium chloride, 10 mL, Intravenous,  Q12H  sodium chloride, 10 mL, Intravenous, Q12H  sodium chloride, 10 mL, Intravenous, Q12H  sodium hypochlorite, , Topical, BID  tamsulosin, 0.4 mg, Oral, Daily  vancomycin, 125 mg, Oral, Q6H      Infusions  Pharmacy Consult,       Diet  Diet: Regular/House, Cardiac, Diabetic; Healthy Heart (2-3 Na+); Consistent Carbohydrate; Fluid Consistency: Thin (IDDSI 0)       Assessment/Plan     Active Hospital Problems    Diagnosis  POA    **Anemia [D64.9]  Yes    Moderate protein-calorie malnutrition [E44.0]  Yes    GI bleed [K92.2]  Yes    Peritoneal dialysis status [Z99.2]  Not Applicable    Hypoalbuminemia [E88.09]  Yes    Acute blood loss anemia [D62]  Unknown    Chronic heart failure with preserved ejection fraction (HFpEF) [I50.32]  Yes    Unsteady gait when walking [R26.81]  Yes    Nocturnal hypoxemia [G47.34]  Yes    CKD (chronic kidney disease) stage 4, GFR 15-29 ml/min [N18.4]  Yes    Persistent atrial fibrillation [I48.19]  Yes    Atrial fibrillation, persistent [I48.19]  Yes    Amputated toe of left foot [S98.132A]  Yes    S/P CABG x 2 [Z95.1]  Not Applicable    Amputated toe of right foot [S98.131A]  Yes    H/O aortic valve replacement with porcine valve [Z95.3]  Not Applicable    Type 2 diabetes mellitus with circulatory disorder, without long-term current use of insulin [E11.59]  Yes    SHASTA (obstructive sleep apnea) [G47.33]  Yes    Essential hypertension [I10]  Yes      Resolved Hospital Problems   No resolved problems to display.       79 y.o. male admitted with Anemia.    Gross hematuria: Underwent CBI and antiplatelet/anticoagulation held.  Status post cystoscopy with TURBT 5/24.  2 areas were concerning for bleeding and body habitus also affected his procedure.  Pathology pending.  Underwent voiding trial and is requiring intermittent cath.  Plavix and Eliquis resumed.   Urology following, outpatient follow-up planned.  Heme positive stool: Status post EGD.  No acute bleeding was noted.  GI  evaluated.  CKD 5/ESRD: Requiring HD.  Nephrology following.  C. difficile diarrhea: On vancomycin to complete course. Chronic intermittent constipation issues. Infectious disease evaluated.  Chronic A-fib/chronic diastolic heart failure/history porcine AVR/history of CABG: Adjust per nephrology needs.  Eliquis as above.  Hypertension: BP elevated but he did receive his medications after dialysis today.  Monitor trend and adjust per nephrology.  He does have extra hydralazine available as needed.  Diabetes: A1c 5.2.  SSI.  Tradjenta held currently.  Glipizide stopped.  PPX: See above  Disposition: SNF/pending placement    Expected Discharge Date: 5/31/2025; Expected Discharge Time:      Raheel Smith MD  [facility]  05/31/25  14:06 EDT    Dictated portions of note using Dragon dictation software.  Copied text in this note has been reviewed by me and remains accurate as of 05/31/25

## 2025-06-01 PROBLEM — R31.0 GROSS HEMATURIA: Status: ACTIVE | Noted: 2025-06-01

## 2025-06-01 PROBLEM — N18.6 ESRD (END STAGE RENAL DISEASE): Status: ACTIVE | Noted: 2025-06-01

## 2025-06-01 LAB
ALBUMIN SERPL-MCNC: 2.6 G/DL (ref 3.5–5.2)
ANION GAP SERPL CALCULATED.3IONS-SCNC: 9.5 MMOL/L (ref 5–15)
BUN SERPL-MCNC: 20 MG/DL (ref 8–23)
BUN/CREAT SERPL: 8 (ref 7–25)
CALCIUM SPEC-SCNC: 8.5 MG/DL (ref 8.6–10.5)
CHLORIDE SERPL-SCNC: 102 MMOL/L (ref 98–107)
CO2 SERPL-SCNC: 22.5 MMOL/L (ref 22–29)
CREAT SERPL-MCNC: 2.5 MG/DL (ref 0.76–1.27)
DEPRECATED RDW RBC AUTO: 53.6 FL (ref 37–54)
EGFRCR SERPLBLD CKD-EPI 2021: 25.3 ML/MIN/1.73
ERYTHROCYTE [DISTWIDTH] IN BLOOD BY AUTOMATED COUNT: 15.3 % (ref 12.3–15.4)
GLUCOSE BLDC GLUCOMTR-MCNC: 139 MG/DL (ref 70–130)
GLUCOSE BLDC GLUCOMTR-MCNC: 156 MG/DL (ref 70–130)
GLUCOSE BLDC GLUCOMTR-MCNC: 182 MG/DL (ref 70–130)
GLUCOSE SERPL-MCNC: 123 MG/DL (ref 65–99)
HCT VFR BLD AUTO: 32.3 % (ref 37.5–51)
HGB BLD-MCNC: 10.4 G/DL (ref 13–17.7)
MAGNESIUM SERPL-MCNC: 1.9 MG/DL (ref 1.6–2.4)
MCH RBC QN AUTO: 30.8 PG (ref 26.6–33)
MCHC RBC AUTO-ENTMCNC: 32.2 G/DL (ref 31.5–35.7)
MCV RBC AUTO: 95.6 FL (ref 79–97)
PHOSPHATE SERPL-MCNC: 2.6 MG/DL (ref 2.5–4.5)
PLATELET # BLD AUTO: 241 10*3/MM3 (ref 140–450)
PMV BLD AUTO: 8.9 FL (ref 6–12)
POTASSIUM SERPL-SCNC: 4.2 MMOL/L (ref 3.5–5.2)
RBC # BLD AUTO: 3.38 10*6/MM3 (ref 4.14–5.8)
SODIUM SERPL-SCNC: 134 MMOL/L (ref 136–145)
WBC NRBC COR # BLD AUTO: 6.77 10*3/MM3 (ref 3.4–10.8)

## 2025-06-01 PROCEDURE — 93005 ELECTROCARDIOGRAM TRACING: CPT | Performed by: INTERNAL MEDICINE

## 2025-06-01 PROCEDURE — 93010 ELECTROCARDIOGRAM REPORT: CPT | Performed by: INTERNAL MEDICINE

## 2025-06-01 PROCEDURE — 85027 COMPLETE CBC AUTOMATED: CPT | Performed by: INTERNAL MEDICINE

## 2025-06-01 PROCEDURE — 80069 RENAL FUNCTION PANEL: CPT | Performed by: INTERNAL MEDICINE

## 2025-06-01 PROCEDURE — 63710000001 INSULIN LISPRO (HUMAN) PER 5 UNITS: Performed by: UROLOGY

## 2025-06-01 PROCEDURE — 83735 ASSAY OF MAGNESIUM: CPT | Performed by: INTERNAL MEDICINE

## 2025-06-01 PROCEDURE — 82948 REAGENT STRIP/BLOOD GLUCOSE: CPT

## 2025-06-01 RX ADMIN — AMIODARONE HYDROCHLORIDE 200 MG: 200 TABLET ORAL at 08:42

## 2025-06-01 RX ADMIN — VANCOMYCIN HYDROCHLORIDE 125 MG: 125 CAPSULE ORAL at 23:55

## 2025-06-01 RX ADMIN — Medication 10 ML: at 22:14

## 2025-06-01 RX ADMIN — VANCOMYCIN HYDROCHLORIDE 125 MG: 125 CAPSULE ORAL at 12:18

## 2025-06-01 RX ADMIN — CAMPHOR, MENTHOL: .5; .5 LOTION TOPICAL at 22:19

## 2025-06-01 RX ADMIN — Medication 10 ML: at 08:47

## 2025-06-01 RX ADMIN — APIXABAN 2.5 MG: 2.5 TABLET, FILM COATED ORAL at 21:38

## 2025-06-01 RX ADMIN — VANCOMYCIN HYDROCHLORIDE 125 MG: 125 CAPSULE ORAL at 06:28

## 2025-06-01 RX ADMIN — CLOBETASOL PROPIONATE CREAM USP, 0.05% 1 APPLICATION: 0.5 CREAM TOPICAL at 22:13

## 2025-06-01 RX ADMIN — ANTI-FUNGAL POWDER MICONAZOLE NITRATE TALC FREE 1 APPLICATION: 1.42 POWDER TOPICAL at 08:48

## 2025-06-01 RX ADMIN — Medication: at 08:47

## 2025-06-01 RX ADMIN — DAKIN'S SOLUTION 0.125% (QUARTER STRENGTH): 0.12 SOLUTION at 08:47

## 2025-06-01 RX ADMIN — ANTI-FUNGAL POWDER MICONAZOLE NITRATE TALC FREE 1 APPLICATION: 1.42 POWDER TOPICAL at 22:13

## 2025-06-01 RX ADMIN — Medication: at 22:15

## 2025-06-01 RX ADMIN — HYDRALAZINE HYDROCHLORIDE 10 MG: 10 TABLET ORAL at 17:11

## 2025-06-01 RX ADMIN — ATORVASTATIN CALCIUM 20 MG: 20 TABLET, FILM COATED ORAL at 21:38

## 2025-06-01 RX ADMIN — SALINE NASAL SPRAY 1 SPRAY: 1.5 SOLUTION NASAL at 22:16

## 2025-06-01 RX ADMIN — OXYCODONE HYDROCHLORIDE AND ACETAMINOPHEN 250 MG: 500 TABLET ORAL at 08:42

## 2025-06-01 RX ADMIN — INSULIN LISPRO 2 UNITS: 100 INJECTION, SOLUTION INTRAVENOUS; SUBCUTANEOUS at 21:38

## 2025-06-01 RX ADMIN — METOPROLOL SUCCINATE 25 MG: 25 TABLET, EXTENDED RELEASE ORAL at 08:42

## 2025-06-01 RX ADMIN — CLOBETASOL PROPIONATE CREAM USP, 0.05% 1 APPLICATION: 0.5 CREAM TOPICAL at 08:47

## 2025-06-01 RX ADMIN — VANCOMYCIN HYDROCHLORIDE 125 MG: 125 CAPSULE ORAL at 17:11

## 2025-06-01 RX ADMIN — BUMETANIDE 2 MG: 1 TABLET ORAL at 06:28

## 2025-06-01 RX ADMIN — BUMETANIDE 2 MG: 1 TABLET ORAL at 17:11

## 2025-06-01 RX ADMIN — CLOPIDOGREL BISULFATE 75 MG: 75 TABLET, FILM COATED ORAL at 08:42

## 2025-06-01 RX ADMIN — DAKIN'S SOLUTION 0.125% (QUARTER STRENGTH): 0.12 SOLUTION at 22:13

## 2025-06-01 RX ADMIN — BUMETANIDE 2 MG: 1 TABLET ORAL at 12:17

## 2025-06-01 RX ADMIN — Medication 5 MG: at 23:57

## 2025-06-01 RX ADMIN — DOCUSATE SODIUM 100 MG: 100 CAPSULE, LIQUID FILLED ORAL at 17:11

## 2025-06-01 RX ADMIN — APIXABAN 2.5 MG: 2.5 TABLET, FILM COATED ORAL at 08:42

## 2025-06-01 RX ADMIN — MENTHOL, ZINC OXIDE 1 APPLICATION: .44; 20.6 OINTMENT TOPICAL at 08:47

## 2025-06-01 RX ADMIN — PANTOPRAZOLE SODIUM 40 MG: 40 TABLET, DELAYED RELEASE ORAL at 06:28

## 2025-06-01 RX ADMIN — TAMSULOSIN HYDROCHLORIDE 0.4 MG: 0.4 CAPSULE ORAL at 08:42

## 2025-06-01 RX ADMIN — HYDRALAZINE HYDROCHLORIDE 10 MG: 10 TABLET ORAL at 08:42

## 2025-06-01 NOTE — PROGRESS NOTES
"      FOLLOW UP NOTE      Name: Rock Maciel Jr. ADMIT: 2025   : 1945  PCP: Denise Dickson APRN    MRN: [id number] LOS: 11 days   AGE/SEX: 79 y.o. male  ROOM: [facility]     Date of Service: 2025                           CHIEF COMPLIANTS / REASON FOR FOLLOW UP          KILLIAN/CKD NXT stage 5 days/week    Subjective:      Seen and examined  HD    No new issues    Review of Systems:       Negative except as above       OBJECTIVE                                                                        Exam:  /86 (BP Location: Left arm, Patient Position: Lying)   Pulse 76   Temp 98.1 °F (36.7 °C) (Oral)   Resp 18   Ht 182.9 cm (72\")   Wt 114 kg (251 lb 1.7 oz)   SpO2 97%   BMI 34.06 kg/m²   Intake/Output last 3 shifts:  I/O last 3 completed shifts:  In: 680 [P.O.:680]  Out: 1100 [Urine:1100]  Intake/Output this shift:  I/O this shift:  In: 720 [P.O.:720]  Out: -     GEN: Pleasant chronically ill elderly gentleman in no acute distress  RESP:Faint crackles heard  CVS: RRR, S1, S2+  ABD: soft, nontender, nondistended  NEURO: AAOX3  EXT: Minimal lower extremity edema  ACCESS: RIJ TDC      Scheduled Meds:amiodarone, 200 mg, Oral, Daily  apixaban, 2.5 mg, Oral, BID  vitamin C, 250 mg, Oral, Daily  atorvastatin, 20 mg, Oral, Nightly  bumetanide, 2 mg, Oral, TID  cetaphil, , Topical, Q12H  clobetasol propionate, 1 Application, Topical, Q12H  clopidogrel, 75 mg, Oral, Daily  epoetin vince/vince-epbx, 10,000 Units, Subcutaneous, Once per day on   hydrALAZINE, 10 mg, Oral, TID  insulin lispro, 2-7 Units, Subcutaneous, 4x Daily AC & at Bedtime  [Held by provider] linagliptin, 5 mg, Oral, Daily  Menthol-Zinc Oxide, 1 Application, Topical, BID  metoprolol succinate XL, 25 mg, Oral, Daily  miconazole, 1 Application, Topical, Q12H  pantoprazole, 40 mg, Oral, Q AM  sodium chloride, 10 mL, Intravenous, Q12H  sodium chloride, 10 mL, Intravenous, Q12H  sodium chloride, 10 mL, " Intravenous, Q12H  sodium hypochlorite, , Topical, BID  tamsulosin, 0.4 mg, Oral, Daily  vancomycin, 125 mg, Oral, Q6H      Continuous Infusions:Pharmacy Consult,       PRN Meds:  acetaminophen **OR** acetaminophen **OR** acetaminophen    calcium carbonate    camphor-menthol    dextrose    dextrose    docusate sodium    glucagon (human recombinant)    heparin (porcine)    heparin    hydrALAZINE    hydrOXYzine    lidocaine    melatonin    nitroglycerin    ondansetron ODT **OR** ondansetron    oxybutynin    oxyCODONE-acetaminophen    Pharmacy Consult    sodium chloride    sodium chloride    sodium chloride    sodium chloride    sodium chloride    sodium chloride    sodium chloride         Data Review:                                                                           Labs reviewed        Imaging:                                                                           Radiology reviewed           ASSESSMENT:                                                                                Anemia    Essential hypertension    Type 2 diabetes mellitus with circulatory disorder, without long-term current use of insulin    SHASTA (obstructive sleep apnea)    H/O aortic valve replacement with porcine valve    Amputated toe of right foot    S/P CABG x 2    Amputated toe of left foot    Atrial fibrillation, persistent    Persistent atrial fibrillation    CKD (chronic kidney disease) stage 4, GFR 15-29 ml/min    Nocturnal hypoxemia    Unsteady gait when walking    Chronic heart failure with preserved ejection fraction (HFpEF)    GI bleed    Peritoneal dialysis status    Hypoalbuminemia    Moderate protein-calorie malnutrition    Acute blood loss anemia     CKD with hospitalization in March 2025 requiring HD due to KILLIAN likely ATN 2/2 rhabdomyolysis, prerenal insult related to diuretics. OM, now on next stage HD 5 days per week (MTWThF) with RIJ TDC.  On HD since 3/12.  Acute TYLER, hematuria  Urinary retention  Bladder mass s/p  resection  A-fib with controlled rates.  History of HFpEF with pulmonary hypertension  Recent osteomyelitis and abscess right foot  T2DM  HTN  A-fib  CAD  PVD   TTE 5/23/25 with EF 56-60%, mild transvalvular regurgitation in prosthetic AV, RVSP >55 mmHg      PLAN:                                                                            HD completed 5/29, 5/31.  Continue TTS schedule.  Next HD next week.  Tolerating UF with improved volume control and blood pressure.  Gross hematuria managed by urology.  Requiring CIC's.  Continue with diuretics.  Continue antihypertensives.  Phosphorus stable watch off binder.  Continue EPO with HD.  Status post EGD, GI input noted  Anemia stable  On p.o. vancomycin for C. difficile.    Awaiting rehab     ZEINA [facility]  6/1/2025  09:39 EDT     The note was transcribed by  ZEINA.  I personally have examined the patient and interviewed the patient and modified the history, exam. I have reviewed the history, data, problems, assessment and plan .and I concur . Exam as described in the Progress note, with comments additions, revisions as noted by me.       MD Cynthia [facility]    6/1/2025  09:39 EDT     None known

## 2025-06-01 NOTE — PROGRESS NOTES
"  Infectious Diseases Progress Note    Nicolasa Denny MD     [facility]  Los: 11 days  Patient Identification:  Name: Rock Maciel Jr.  Age: 79 y.o.  Sex: male  :  1945  MRN: [id number]         Primary Care Physician: Denise Dickson, ZEINA        Subjective: Denies any fever and chills.  No issues.  Hoping to be discharged in a day or 2.  Interval History: See consultation note.    Objective:    Scheduled Meds:amiodarone, 200 mg, Oral, Daily  apixaban, 2.5 mg, Oral, BID  vitamin C, 250 mg, Oral, Daily  atorvastatin, 20 mg, Oral, Nightly  bumetanide, 2 mg, Oral, TID  cetaphil, , Topical, Q12H  clobetasol propionate, 1 Application, Topical, Q12H  clopidogrel, 75 mg, Oral, Daily  epoetin vince/vince-epbx, 10,000 Units, Subcutaneous, Once per day on   hydrALAZINE, 10 mg, Oral, TID  insulin lispro, 2-7 Units, Subcutaneous, 4x Daily AC & at Bedtime  [Held by provider] linagliptin, 5 mg, Oral, Daily  Menthol-Zinc Oxide, 1 Application, Topical, BID  metoprolol succinate XL, 25 mg, Oral, Daily  miconazole, 1 Application, Topical, Q12H  pantoprazole, 40 mg, Oral, Q AM  sodium chloride, 10 mL, Intravenous, Q12H  sodium chloride, 10 mL, Intravenous, Q12H  sodium chloride, 10 mL, Intravenous, Q12H  sodium hypochlorite, , Topical, BID  tamsulosin, 0.4 mg, Oral, Daily  vancomycin, 125 mg, Oral, Q6H      Continuous Infusions:Pharmacy Consult,         Vital signs in last 24 hours:  Temp:  [97.3 °F (36.3 °C)-98.2 °F (36.8 °C)] 97.3 °F (36.3 °C)  Heart Rate:  [75-90] 79  Resp:  [16-18] 18  BP: (121-148)/(48-89) 121/48    Intake/Output:    Intake/Output Summary (Last 24 hours) at 2025 1917  Last data filed at 2025 1830  Gross per 24 hour   Intake 1160 ml   Output --   Net 1160 ml       Exam:  /48 (BP Location: Left arm, Patient Position: Lying)   Pulse 79   Temp 97.3 °F (36.3 °C) (Oral)   Resp 18   Ht 182.9 cm (72\")   Wt 114 kg (251 lb 1.7 oz)   SpO2 92%   BMI 34.06 " kg/m²   Patient is examined using the personal protective equipment as per guidelines from infection control for this particular patient as enacted.  Hand washing was performed before and after patient interaction.  General Appearance:  Alert and oriented x 3                          Head:    Normocephalic, without obvious abnormality, atraumatic                           Eyes:    PERRL, conjunctivae/corneas clear, EOM's intact, both eyes                         Throat:   Lips, tongue, gums normal; oral mucosa pink and moist                           Neck:   Supple, symmetrical, trachea midline, no JVD                         Lungs:    Clear to auscultation bilaterally, respirations unlabored                 Chest Wall:    No tenderness or deformity                          Heart:  S1-S2 regular                  Abdomen:   Soft nontender three-way catheter in place                 Extremities: No surrounding inflammation.                              Skin: Skin changes due to decubital process noted.                  Neurologic: Alert and oriented x 3       Data Review:    I reviewed the patient's new clinical results.  Results from last 7 days   Lab Units 06/01/25  0505 05/31/25  0515 05/30/25  0557 05/29/25  0551 05/28/25  0628 05/27/25  0656 05/26/25  0540   WBC 10*3/mm3 6.77 8.29 7.81 7.25 7.01 8.02 9.58   HEMOGLOBIN g/dL 10.4* 9.9* 9.5* 9.0* 8.6* 8.2* 8.8*   PLATELETS 10*3/mm3 241 231 204 200 191 222 219     Results from last 7 days   Lab Units 06/01/25  0505 05/31/25  0515 05/30/25  0557 05/29/25  0551 05/28/25  0628 05/27/25  0656 05/26/25  0540   SODIUM mmol/L 134* 136 138 134* 134* 135* 137   POTASSIUM mmol/L 4.2 4.1 4.1 4.1 4.0 4.6 4.2   CHLORIDE mmol/L 102 102 104 102 100 103 103   CO2 mmol/L 22.5 23.0 25.0 24.0 24.3 24.0 24.4   BUN mg/dL 20.0 24.0* 18.0 28.0* 23.0 39* 32*   CREATININE mg/dL 2.50* 2.91* 2.43* 3.20* 2.60* 4.39* 3.64*   CALCIUM mg/dL 8.5* 8.7 8.6 8.4* 8.2* 8.3* 8.4*   GLUCOSE mg/dL 123*  124* 112* 104* 82 78 70     Microbiology Results (last 10 days)       Procedure Component Value - Date/Time    Clostridioides difficile Toxin - Stool, Per Rectum [[id number]]  (Abnormal) Collected: 05/25/25 0427    Lab Status: Final result Specimen: Stool from Per Rectum Updated: 05/25/25 0722    Narrative:      The following orders were created for panel order Clostridioides difficile Toxin - Stool, Per Rectum.  Procedure                               Abnormality         Status                     ---------                               -----------         ------                     Clostridioides difficile...[[id number]]  Abnormal            Final result                 Please view results for these tests on the individual orders.    Clostridioides difficile Toxin, PCR - Stool, Per Rectum [[id number]]  (Abnormal) Collected: 05/25/25 0427    Lab Status: Final result Specimen: Stool from Per Rectum Updated: 05/25/25 0722     Toxigenic C. difficile by PCR Positive    Narrative:      DNA from a toxigenic strain of C.difficile has been detected. Antigen testing for the presence of free C.difficile toxin is currently in progress, to help determine the clinical significance of this PCR result.     Clostridioides difficile toxin Ag, Reflex - Stool, Per Rectum [[id number]]  (Normal) Collected: 05/25/25 0427    Lab Status: Final result Specimen: Stool from Per Rectum Updated: 05/25/25 0852     C.diff Toxin Ag Negative    Narrative:      DNA from a toxigenic strain of C.difficile was detected, although the free toxin itself was not detected. These findings are consistent with C.difficile colonization and may not reflect actual C.difficile infection. Clinical correlation needed.                Assessment:    Gross hematuria    Essential hypertension    Type 2 diabetes mellitus with circulatory disorder, without long-term current use of insulin    SHASTA (obstructive sleep apnea)    H/O aortic valve replacement with porcine valve     Amputated toe of right foot    S/P CABG x 2    Persistent atrial fibrillation    Nocturnal hypoxemia    Unsteady gait when walking    Chronic heart failure with preserved ejection fraction (HFpEF)    Hypoalbuminemia    Moderate protein-calorie malnutrition    Acute blood loss anemia    ESRD (end stage renal disease)    79-year-old male with  1-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  Patient currently seems to be stable and does not have evidence of active right foot infection or systemic symptoms.  He does have open wound on the right foot that would require care.  2-severe anemia multifactorial management per primary team  3-immobility with evolving decubitus skin changes  4-end-stage renal disease on dialysis per nephrology service via right IJ tunnel catheter  5-immobilization syndrome #6-diabetes  7-peripheral vascular disease  8-C. difficile diarrhea with colonization.        Recommendations/Discussions:  See my discussion and recommendations on 5/23/2025.  Continue with local wound care as clinically there is no evidence of active infection of the foot.  Continue follow-up as per podiatry recommendations.  Supportive care and management of other issues per primary team.  Continue oral vancomycin for 10 days.  Avoid broad-spectrum antibiotic therapy if possible.  Nicolasa Denny MD  6/1/2025  19:17 EDT    Parts of this note may be an electronic transcription/translation of spoken language to printed text using the Dragon dictation system.

## 2025-06-01 NOTE — PROGRESS NOTES
"Nutrition Services    Patient Name: Rock Maciel Jr.  YOB: 1945  MRN: [id number]  Admission date: 5/21/2025    PROGRESS NOTE      Encounter Information: Follow up       PO Diet: Diet: Regular/House, Cardiac, Diabetic; Healthy Heart (2-3 Na+); Consistent Carbohydrate; Fluid Consistency: Thin (IDDSI 0)   PO Supplements: Boost Glucose Control, Daily   PO Intake:  0-100%       Current nutrition support: --   Nutrition support review: --       Weight: Weight: 114 kg (251 lb 1.7 oz) (06/01/25 0628)       Medications: reviewed   Labs: reviewed        GI Function:  last bowel movement: 5/25       Nutrition Intervention Updates: Will continue to monitor po and encourage good intake.  Boost GC daily.    RD to continue to follow.       Results from last 7 days   Lab Units 06/01/25 0505 05/31/25 0515 05/30/25  0557 05/27/25  0656 05/26/25  0540   SODIUM mmol/L 134* 136 138   < > 137   POTASSIUM mmol/L 4.2 4.1 4.1   < > 4.2   CHLORIDE mmol/L 102 102 104   < > 103   CO2 mmol/L 22.5 23.0 25.0   < > 24.4   BUN mg/dL 20.0 24.0* 18.0   < > 32*   CREATININE mg/dL 2.50* 2.91* 2.43*   < > 3.64*   CALCIUM mg/dL 8.5* 8.7 8.6   < > 8.4*   BILIRUBIN mg/dL  --   --   --   --  0.2   ALK PHOS U/L  --   --   --   --  89   ALT (SGPT) U/L  --   --   --   --  10   AST (SGOT) U/L  --   --   --   --  13   GLUCOSE mg/dL 123* 124* 112*   < > 70    < > = values in this interval not displayed.     Results from last 7 days   Lab Units 06/01/25 0505 05/31/25  0515 05/30/25  0557   MAGNESIUM mg/dL 1.9 1.9 1.8   PHOSPHORUS mg/dL 2.6 3.1 2.4*   HEMOGLOBIN g/dL 10.4* 9.9* 9.5*   HEMATOCRIT % 32.3* 30.2* 30.5*     No results found for: \"COVID19\"  Lab Results   Component Value Date    HGBA1C 5.20 05/28/2025       RD to follow up per protocol.    Electronically signed by:  Savannah Golden RD  06/01/25 15:16 EDT  "

## 2025-06-01 NOTE — PLAN OF CARE
Problem: Fall Injury Risk  Goal: Absence of Fall and Fall-Related Injury  Outcome: Progressing     Problem: Skin Injury Risk Increased  Goal: Skin Health and Integrity  Outcome: Progressing   Goal Outcome Evaluation:  Plan of Care Reviewed With: patient        Progress: no change  Outcome Evaluation: Pt appeared to sleep fair, alert and oriented x4, wound care provided, pt remains on o2 at 2L, Given melatonin and atarax for sleep with poor results, now in controlled afib, lha notified, note left for oncoming md, bladder scanned, pt did not urinate this shift, compliant with repositioning at times

## 2025-06-01 NOTE — PROGRESS NOTES
Name: Rock Maciel Jr. ADMIT: 2025   : 1945  PCP: Denise Dickson APRN    MRN: [id number] LOS: 11 days   AGE/SEX: 79 y.o. male  ROOM: [facility]     Subjective   Subjective   Feeling okay today. No N/V/D/abd pain. Tolerating diet though doesn't like the food. Making very little urine. No SOA or CP or palp. No F/C/NS.       Objective   Objective   Vital Signs  Temp:  [98 °F (36.7 °C)-98.2 °F (36.8 °C)] 98.1 °F (36.7 °C)  Heart Rate:  [76-90] 76  Resp:  [16-18] 18  BP: (132-167)/(58-86) 140/86  SpO2:  [96 %-97 %] 97 %  on  Flow (L/min) (Oxygen Therapy):  [2] 2;   Device (Oxygen Therapy): nasal cannula  Body mass index is 34.06 kg/m².  Physical Exam  Vitals and nursing note reviewed.   Constitutional:       General: He is not in acute distress.     Appearance: He is ill-appearing (chronically). He is not toxic-appearing or diaphoretic.   HENT:      Head: Normocephalic.      Nose: Nose normal.      Mouth/Throat:      Mouth: Mucous membranes are moist.      Pharynx: Oropharynx is clear.   Eyes:      General: No scleral icterus.        Right eye: No discharge.         Left eye: No discharge.      Conjunctiva/sclera: Conjunctivae normal.   Cardiovascular:      Rate and Rhythm: Normal rate. Rhythm irregular.   Pulmonary:      Effort: Pulmonary effort is normal. No respiratory distress.      Breath sounds: Normal breath sounds. No wheezing or rales.   Abdominal:      General: Bowel sounds are normal. There is no distension.      Palpations: Abdomen is soft.      Tenderness: There is no abdominal tenderness.   Musculoskeletal:         General: Swelling and deformity (s/p bilateral toe amputations) present.      Cervical back: Neck supple.   Skin:     General: Skin is warm and dry.      Capillary Refill: Capillary refill takes less than 2 seconds.      Coloration: Skin is pale. Skin is not jaundiced.   Neurological:      General: No focal deficit present.      Mental Status: He is alert. Mental status is at  baseline.   Psychiatric:         Mood and Affect: Mood normal.         Behavior: Behavior normal.         Thought Content: Thought content normal.       Results Review     I reviewed the patient's new clinical results.  Results from last 7 days   Lab Units 06/01/25 0505 05/31/25 0515 05/30/25 0557 05/29/25  0551   WBC 10*3/mm3 6.77 8.29 7.81 7.25   HEMOGLOBIN g/dL 10.4* 9.9* 9.5* 9.0*   PLATELETS 10*3/mm3 241 231 204 200     Results from last 7 days   Lab Units 06/01/25 0505 05/31/25 0515 05/30/25 0557 05/29/25  0551   SODIUM mmol/L 134* 136 138 134*   POTASSIUM mmol/L 4.2 4.1 4.1 4.1   CHLORIDE mmol/L 102 102 104 102   CO2 mmol/L 22.5 23.0 25.0 24.0   BUN mg/dL 20.0 24.0* 18.0 28.0*   CREATININE mg/dL 2.50* 2.91* 2.43* 3.20*   GLUCOSE mg/dL 123* 124* 112* 104*   EGFR mL/min/1.73 25.3* 21.1* 26.2* 18.8*     Results from last 7 days   Lab Units 06/01/25 0505 05/31/25 0515 05/30/25 0557 05/29/25  0551 05/28/25  0628 05/26/25  0540   ALBUMIN g/dL 2.6* 2.9* 2.9* 2.7*   < > 2.4*   BILIRUBIN mg/dL  --   --   --   --   --  0.2   ALK PHOS U/L  --   --   --   --   --  89   AST (SGOT) U/L  --   --   --   --   --  13   ALT (SGPT) U/L  --   --   --   --   --  10    < > = values in this interval not displayed.     Results from last 7 days   Lab Units 06/01/25 0505 05/31/25 0515 05/30/25 0557 05/29/25  0551   CALCIUM mg/dL 8.5* 8.7 8.6 8.4*   ALBUMIN g/dL 2.6* 2.9* 2.9* 2.7*   MAGNESIUM mg/dL 1.9 1.9 1.8 2.0   PHOSPHORUS mg/dL 2.6 3.1 2.4* 3.2       Glucose   Date/Time Value Ref Range Status   06/01/2025 1118 156 (H) 70 - 130 mg/dL Final   05/31/2025 2311 133 (H) 70 - 130 mg/dL Final   05/31/2025 1641 176 (H) 70 - 130 mg/dL Final   05/31/2025 1118 102 70 - 130 mg/dL Final   05/31/2025 0640 128 70 - 130 mg/dL Final   05/30/2025 2050 139 (H) 70 - 130 mg/dL Final   05/30/2025 1537 143 (H) 70 - 130 mg/dL Final       No radiology results for the last day    I have personally reviewed all medications:  Scheduled  Medications  amiodarone, 200 mg, Oral, Daily  apixaban, 2.5 mg, Oral, BID  vitamin C, 250 mg, Oral, Daily  atorvastatin, 20 mg, Oral, Nightly  bumetanide, 2 mg, Oral, TID  cetaphil, , Topical, Q12H  clobetasol propionate, 1 Application, Topical, Q12H  clopidogrel, 75 mg, Oral, Daily  epoetin vince/vince-epbx, 10,000 Units, Subcutaneous, Once per day on Monday Wednesday Friday  hydrALAZINE, 10 mg, Oral, TID  insulin lispro, 2-7 Units, Subcutaneous, 4x Daily AC & at Bedtime  [Held by provider] linagliptin, 5 mg, Oral, Daily  Menthol-Zinc Oxide, 1 Application, Topical, BID  metoprolol succinate XL, 25 mg, Oral, Daily  miconazole, 1 Application, Topical, Q12H  pantoprazole, 40 mg, Oral, Q AM  sodium chloride, 10 mL, Intravenous, Q12H  sodium chloride, 10 mL, Intravenous, Q12H  sodium chloride, 10 mL, Intravenous, Q12H  sodium hypochlorite, , Topical, BID  tamsulosin, 0.4 mg, Oral, Daily  vancomycin, 125 mg, Oral, Q6H    Infusions  Pharmacy Consult,     Diet  Diet: Regular/House, Cardiac, Diabetic; Healthy Heart (2-3 Na+); Consistent Carbohydrate; Fluid Consistency: Thin (IDDSI 0)    I have personally reviewed:  [x]  Laboratory   []  Microbiology   []  Radiology   [x]  EKG/Telemetry  [x]  Cardiology/Vascular   []  Pathology    [x]  Records       Assessment/Plan     Active Hospital Problems    Diagnosis  POA    **Gross hematuria [R31.0]  Yes    ESRD (end stage renal disease) [N18.6]  Yes    Moderate protein-calorie malnutrition [E44.0]  Yes    Hypoalbuminemia [E88.09]  Yes    Acute blood loss anemia [D62]  Yes    Chronic heart failure with preserved ejection fraction (HFpEF) [I50.32]  Yes    Unsteady gait when walking [R26.81]  Yes    Nocturnal hypoxemia [G47.34]  Yes    Persistent atrial fibrillation [I48.19]  Yes    S/P CABG x 2 [Z95.1]  Not Applicable    Amputated toe of right foot [S98.131A]  Yes    H/O aortic valve replacement with porcine valve [Z95.3]  Not Applicable    Type 2 diabetes mellitus with circulatory  disorder, without long-term current use of insulin [E11.59]  Yes    SHASTA (obstructive sleep apnea) [G47.33]  Yes    Essential hypertension [I10]  Yes      Resolved Hospital Problems   No resolved problems to display.       78yo gentleman with recent right DFU, ESRD, DM2, HTN, SHASTA, porcine AVR, CAD/CABG, HFpEF, and AFib (Eliquis), who was admitted with gross hematuria and anemia.      Gross hematuria/Acute blood loss anemia: Followed by . Initially started on CBI and antiplatelet/anticoagulation was held. Status post cystoscopy with TURBT on 5/24 by Dr. Cornejo. Two areas were concerning for bleeding. Pathology neg for malignancy.  Underwent voiding trial and is now requiring intermittent catheterization. Plavix and Eliquis have been resumed. Outpatient follow-up with  planned in 1-2 weeks. Hgb stable now.  Heme positive stool: Status post EGD on 5/25 by Dr. Webb.  No acute bleeding was noted.  ESRD: Requiring HD TTS. Nephrology following.  C. difficile diarrhea with colonization: On vancomycin to complete 10d course per ID.   Chronic A-fib/HFpEF/Porcine AVR/CABG: Meds adjusted per Nephrology needs. Eliquis and Plavix restarted when okay with . HRs fine on amiodarone and metoprolol.  Hypertension: BP acceptable on current meds.  DM2: A1c 5.2. Continue SSI. Tradjenta held currently.  Glipizide stopped.  Right DFU with osteo/abscess in March: Per ID he is currently stable having completed course of abx and now only requires local wound care and f/u with Podiatry as outpt.  SHASTA:  Uses CPAP at home but does not have here. Continue PRN nasal cannula O2 at HS as needed here. Start IS.       Eliquis (home med) for DVT prophylaxis.  Full code.  Discussed with patient and RN.  Anticipate discharge to SNU facility once precert has been obtained.  Expected Discharge Date: 6/2/2025; Expected Discharge Time:       Ru Markham MD  [facility]  06/01/25  12:51 EDT

## 2025-06-01 NOTE — PLAN OF CARE
Goal Outcome Evaluation:      Pt is A&Ox4; refusing Q2 turns in the bed; no complaints of pain or SOA; 2L of O2 when sleeping; no BM this shift; medicated per orders; plan of care on going.

## 2025-06-02 LAB
ALBUMIN SERPL-MCNC: 2.6 G/DL (ref 3.5–5.2)
ALP SERPL-CCNC: 88 U/L (ref 39–117)
ALT SERPL W P-5'-P-CCNC: 15 U/L (ref 1–41)
ANION GAP SERPL CALCULATED.3IONS-SCNC: 7.1 MMOL/L (ref 5–15)
AST SERPL-CCNC: 14 U/L (ref 1–40)
BILIRUB CONJ SERPL-MCNC: 0.1 MG/DL (ref 0–0.3)
BILIRUB INDIRECT SERPL-MCNC: 0.2 MG/DL
BILIRUB SERPL-MCNC: 0.3 MG/DL (ref 0–1.2)
BUN SERPL-MCNC: 32 MG/DL (ref 8–23)
BUN/CREAT SERPL: 9.8 (ref 7–25)
CALCIUM SPEC-SCNC: 8.8 MG/DL (ref 8.6–10.5)
CHLORIDE SERPL-SCNC: 102 MMOL/L (ref 98–107)
CO2 SERPL-SCNC: 25.9 MMOL/L (ref 22–29)
CREAT SERPL-MCNC: 3.26 MG/DL (ref 0.76–1.27)
DEPRECATED RDW RBC AUTO: 53.6 FL (ref 37–54)
EGFRCR SERPLBLD CKD-EPI 2021: 18.4 ML/MIN/1.73
ERYTHROCYTE [DISTWIDTH] IN BLOOD BY AUTOMATED COUNT: 15.3 % (ref 12.3–15.4)
GLUCOSE BLDC GLUCOMTR-MCNC: 136 MG/DL (ref 70–130)
GLUCOSE BLDC GLUCOMTR-MCNC: 157 MG/DL (ref 70–130)
GLUCOSE BLDC GLUCOMTR-MCNC: 198 MG/DL (ref 70–130)
GLUCOSE BLDC GLUCOMTR-MCNC: 217 MG/DL (ref 70–130)
GLUCOSE SERPL-MCNC: 136 MG/DL (ref 65–99)
HCT VFR BLD AUTO: 31.1 % (ref 37.5–51)
HGB BLD-MCNC: 9.7 G/DL (ref 13–17.7)
MAGNESIUM SERPL-MCNC: 1.9 MG/DL (ref 1.6–2.4)
MCH RBC QN AUTO: 29.9 PG (ref 26.6–33)
MCHC RBC AUTO-ENTMCNC: 31.2 G/DL (ref 31.5–35.7)
MCV RBC AUTO: 96 FL (ref 79–97)
PHOSPHATE SERPL-MCNC: 3.9 MG/DL (ref 2.5–4.5)
PLATELET # BLD AUTO: 261 10*3/MM3 (ref 140–450)
PMV BLD AUTO: 9.3 FL (ref 6–12)
POTASSIUM SERPL-SCNC: 4.3 MMOL/L (ref 3.5–5.2)
PROT SERPL-MCNC: 6.5 G/DL (ref 6–8.5)
RBC # BLD AUTO: 3.24 10*6/MM3 (ref 4.14–5.8)
SODIUM SERPL-SCNC: 135 MMOL/L (ref 136–145)
WBC NRBC COR # BLD AUTO: 5.67 10*3/MM3 (ref 3.4–10.8)

## 2025-06-02 PROCEDURE — 83735 ASSAY OF MAGNESIUM: CPT | Performed by: INTERNAL MEDICINE

## 2025-06-02 PROCEDURE — 85027 COMPLETE CBC AUTOMATED: CPT | Performed by: HOSPITALIST

## 2025-06-02 PROCEDURE — 25010000002 EPOETIN ALFA-EPBX 10000 UNIT/ML SOLUTION: Performed by: UROLOGY

## 2025-06-02 PROCEDURE — 80076 HEPATIC FUNCTION PANEL: CPT | Performed by: HOSPITALIST

## 2025-06-02 PROCEDURE — 82948 REAGENT STRIP/BLOOD GLUCOSE: CPT

## 2025-06-02 PROCEDURE — 63710000001 INSULIN LISPRO (HUMAN) PER 5 UNITS: Performed by: UROLOGY

## 2025-06-02 PROCEDURE — 84100 ASSAY OF PHOSPHORUS: CPT | Performed by: HOSPITALIST

## 2025-06-02 PROCEDURE — 80048 BASIC METABOLIC PNL TOTAL CA: CPT | Performed by: INTERNAL MEDICINE

## 2025-06-02 RX ADMIN — Medication: at 22:27

## 2025-06-02 RX ADMIN — ATORVASTATIN CALCIUM 20 MG: 20 TABLET, FILM COATED ORAL at 22:22

## 2025-06-02 RX ADMIN — HYDRALAZINE HYDROCHLORIDE 10 MG: 10 TABLET ORAL at 22:26

## 2025-06-02 RX ADMIN — OXYCODONE HYDROCHLORIDE AND ACETAMINOPHEN 250 MG: 500 TABLET ORAL at 08:04

## 2025-06-02 RX ADMIN — CLOPIDOGREL BISULFATE 75 MG: 75 TABLET, FILM COATED ORAL at 08:04

## 2025-06-02 RX ADMIN — DAKIN'S SOLUTION 0.125% (QUARTER STRENGTH): 0.12 SOLUTION at 22:27

## 2025-06-02 RX ADMIN — VANCOMYCIN HYDROCHLORIDE 125 MG: 125 CAPSULE ORAL at 11:56

## 2025-06-02 RX ADMIN — HYDROXYZINE HYDROCHLORIDE 25 MG: 25 TABLET, FILM COATED ORAL at 22:22

## 2025-06-02 RX ADMIN — MENTHOL, ZINC OXIDE 1 APPLICATION: .44; 20.6 OINTMENT TOPICAL at 22:24

## 2025-06-02 RX ADMIN — Medication 5 MG: at 22:23

## 2025-06-02 RX ADMIN — VANCOMYCIN HYDROCHLORIDE 125 MG: 125 CAPSULE ORAL at 17:10

## 2025-06-02 RX ADMIN — INSULIN LISPRO 2 UNITS: 100 INJECTION, SOLUTION INTRAVENOUS; SUBCUTANEOUS at 17:10

## 2025-06-02 RX ADMIN — AMIODARONE HYDROCHLORIDE 200 MG: 200 TABLET ORAL at 08:04

## 2025-06-02 RX ADMIN — HYDRALAZINE HYDROCHLORIDE 10 MG: 10 TABLET ORAL at 17:10

## 2025-06-02 RX ADMIN — VANCOMYCIN HYDROCHLORIDE 125 MG: 125 CAPSULE ORAL at 06:15

## 2025-06-02 RX ADMIN — VANCOMYCIN HYDROCHLORIDE 125 MG: 125 CAPSULE ORAL at 22:22

## 2025-06-02 RX ADMIN — MENTHOL, ZINC OXIDE 1 APPLICATION: .44; 20.6 OINTMENT TOPICAL at 08:08

## 2025-06-02 RX ADMIN — ANTI-FUNGAL POWDER MICONAZOLE NITRATE TALC FREE 1 APPLICATION: 1.42 POWDER TOPICAL at 22:27

## 2025-06-02 RX ADMIN — HYDRALAZINE HYDROCHLORIDE 10 MG: 10 TABLET ORAL at 08:04

## 2025-06-02 RX ADMIN — DAKIN'S SOLUTION 0.125% (QUARTER STRENGTH): 0.12 SOLUTION at 08:10

## 2025-06-02 RX ADMIN — Medication 10 ML: at 08:10

## 2025-06-02 RX ADMIN — METOPROLOL SUCCINATE 25 MG: 25 TABLET, EXTENDED RELEASE ORAL at 08:04

## 2025-06-02 RX ADMIN — TAMSULOSIN HYDROCHLORIDE 0.4 MG: 0.4 CAPSULE ORAL at 08:04

## 2025-06-02 RX ADMIN — APIXABAN 2.5 MG: 2.5 TABLET, FILM COATED ORAL at 22:23

## 2025-06-02 RX ADMIN — Medication: at 08:09

## 2025-06-02 RX ADMIN — SALINE NASAL SPRAY 1 SPRAY: 1.5 SOLUTION NASAL at 22:26

## 2025-06-02 RX ADMIN — CLOBETASOL PROPIONATE CREAM USP, 0.05% 1 APPLICATION: 0.5 CREAM TOPICAL at 08:09

## 2025-06-02 RX ADMIN — BUMETANIDE 2 MG: 1 TABLET ORAL at 17:10

## 2025-06-02 RX ADMIN — BUMETANIDE 2 MG: 1 TABLET ORAL at 06:15

## 2025-06-02 RX ADMIN — BUMETANIDE 2 MG: 1 TABLET ORAL at 11:56

## 2025-06-02 RX ADMIN — APIXABAN 2.5 MG: 2.5 TABLET, FILM COATED ORAL at 08:04

## 2025-06-02 RX ADMIN — ANTI-FUNGAL POWDER MICONAZOLE NITRATE TALC FREE 1 APPLICATION: 1.42 POWDER TOPICAL at 08:09

## 2025-06-02 RX ADMIN — EPOETIN ALFA-EPBX 10000 UNITS: 10000 INJECTION, SOLUTION INTRAVENOUS; SUBCUTANEOUS at 08:04

## 2025-06-02 RX ADMIN — PANTOPRAZOLE SODIUM 40 MG: 40 TABLET, DELAYED RELEASE ORAL at 06:15

## 2025-06-02 RX ADMIN — CLOBETASOL PROPIONATE CREAM USP, 0.05% 1 APPLICATION: 0.5 CREAM TOPICAL at 22:27

## 2025-06-02 NOTE — PROGRESS NOTES
"  Infectious Diseases Progress Note    Nicolasa Denny MD     [facility]  Los: 12 days  Patient Identification:  Name: Rock Maciel Jr.  Age: 79 y.o.  Sex: male  :  1945  MRN: [id number]         Primary Care Physician: Denise Dickson, ZEINA        Subjective: Denies any specific complaints.  His major concern is ongoing need for In-N-Out catheterizations for his urinary issues.  Interval History: See consultation note.    Objective:    Scheduled Meds:amiodarone, 200 mg, Oral, Daily  apixaban, 2.5 mg, Oral, BID  vitamin C, 250 mg, Oral, Daily  atorvastatin, 20 mg, Oral, Nightly  bumetanide, 2 mg, Oral, TID  cetaphil, , Topical, Q12H  clobetasol propionate, 1 Application, Topical, Q12H  clopidogrel, 75 mg, Oral, Daily  epoetin vince/vince-epbx, 10,000 Units, Subcutaneous, Once per day on   hydrALAZINE, 10 mg, Oral, TID  insulin lispro, 2-7 Units, Subcutaneous, 4x Daily AC & at Bedtime  linagliptin, 5 mg, Oral, Daily  Menthol-Zinc Oxide, 1 Application, Topical, BID  metoprolol succinate XL, 25 mg, Oral, Daily  miconazole, 1 Application, Topical, Q12H  pantoprazole, 40 mg, Oral, Q AM  sodium chloride, 10 mL, Intravenous, Q12H  sodium chloride, 10 mL, Intravenous, Q12H  sodium chloride, 10 mL, Intravenous, Q12H  sodium hypochlorite, , Topical, BID  tamsulosin, 0.4 mg, Oral, Daily  vancomycin, 125 mg, Oral, Q6H      Continuous Infusions:Pharmacy Consult,         Vital signs in last 24 hours:  Temp:  [97.3 °F (36.3 °C)-97.5 °F (36.4 °C)] 97.5 °F (36.4 °C)  Heart Rate:  [67-80] 67  Resp:  [18] 18  BP: (121-153)/(48-62) 138/59    Intake/Output:    Intake/Output Summary (Last 24 hours) at 2025 7036  Last data filed at 2025 0544  Gross per 24 hour   Intake 200 ml   Output 900 ml   Net -700 ml       Exam:  /59 (BP Location: Left arm, Patient Position: Lying)   Pulse 67   Temp 97.5 °F (36.4 °C) (Oral)   Resp 18   Ht 182.9 cm (72\")   Wt 114 kg (250 lb 7.1 oz)   " SpO2 97%   BMI 33.97 kg/m²   Patient is examined using the personal protective equipment as per guidelines from infection control for this particular patient as enacted.  Hand washing was performed before and after patient interaction.  General Appearance:  Alert and oriented x 3                          Head:    Normocephalic, without obvious abnormality, atraumatic                           Eyes:    PERRL, conjunctivae/corneas clear, EOM's intact, both eyes                         Throat:   Lips, tongue, gums normal; oral mucosa pink and moist                           Neck:   Supple, symmetrical, trachea midline, no JVD                         Lungs:    Clear to auscultation bilaterally, respirations unlabored                 Chest Wall:    No tenderness or deformity                          Heart:  S1-S2 regular                  Abdomen:   Soft nontender three-way catheter in place                 Extremities: No surrounding inflammation.                              Skin: Skin changes due to decubital process noted.                  Neurologic: Alert and oriented x 3       Data Review:    I reviewed the patient's new clinical results.  Results from last 7 days   Lab Units 06/02/25  0631 06/01/25  0505 05/31/25  0515 05/30/25  0557 05/29/25  0551 05/28/25  0628 05/27/25  0656   WBC 10*3/mm3 5.67 6.77 8.29 7.81 7.25 7.01 8.02   HEMOGLOBIN g/dL 9.7* 10.4* 9.9* 9.5* 9.0* 8.6* 8.2*   PLATELETS 10*3/mm3 261 241 231 204 200 191 222     Results from last 7 days   Lab Units 06/02/25  0631 06/01/25  0505 05/31/25  0515 05/30/25  0557 05/29/25  0551 05/28/25  0628 05/27/25  0656   SODIUM mmol/L 135* 134* 136 138 134* 134* 135*   POTASSIUM mmol/L 4.3 4.2 4.1 4.1 4.1 4.0 4.6   CHLORIDE mmol/L 102 102 102 104 102 100 103   CO2 mmol/L 25.9 22.5 23.0 25.0 24.0 24.3 24.0   BUN mg/dL 32.0* 20.0 24.0* 18.0 28.0* 23.0 39*   CREATININE mg/dL 3.26* 2.50* 2.91* 2.43* 3.20* 2.60* 4.39*   CALCIUM mg/dL 8.8 8.5* 8.7 8.6 8.4* 8.2* 8.3*    GLUCOSE mg/dL 136* 123* 124* 112* 104* 82 78     Microbiology Results (last 10 days)       Procedure Component Value - Date/Time    Clostridioides difficile Toxin - Stool, Per Rectum [[id number]]  (Abnormal) Collected: 05/25/25 0427    Lab Status: Final result Specimen: Stool from Per Rectum Updated: 05/25/25 0722    Narrative:      The following orders were created for panel order Clostridioides difficile Toxin - Stool, Per Rectum.  Procedure                               Abnormality         Status                     ---------                               -----------         ------                     Clostridioides difficile...[[id number]]  Abnormal            Final result                 Please view results for these tests on the individual orders.    Clostridioides difficile Toxin, PCR - Stool, Per Rectum [[id number]]  (Abnormal) Collected: 05/25/25 0427    Lab Status: Final result Specimen: Stool from Per Rectum Updated: 05/25/25 0722     Toxigenic C. difficile by PCR Positive    Narrative:      DNA from a toxigenic strain of C.difficile has been detected. Antigen testing for the presence of free C.difficile toxin is currently in progress, to help determine the clinical significance of this PCR result.     Clostridioides difficile toxin Ag, Reflex - Stool, Per Rectum [[id number]]  (Normal) Collected: 05/25/25 0427    Lab Status: Final result Specimen: Stool from Per Rectum Updated: 05/25/25 0852     C.diff Toxin Ag Negative    Narrative:      DNA from a toxigenic strain of C.difficile was detected, although the free toxin itself was not detected. These findings are consistent with C.difficile colonization and may not reflect actual C.difficile infection. Clinical correlation needed.                Assessment:    Gross hematuria    Essential hypertension    Type 2 diabetes mellitus with circulatory disorder, without long-term current use of insulin    SHASTA (obstructive sleep apnea)    H/O aortic valve replacement  with porcine valve    Amputated toe of right foot    S/P CABG x 2    Persistent atrial fibrillation    Nocturnal hypoxemia    Unsteady gait when walking    Chronic heart failure with preserved ejection fraction (HFpEF)    Hypoalbuminemia    Moderate protein-calorie malnutrition    Acute blood loss anemia    ESRD (end stage renal disease)    79-year-old male with  1-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  Patient currently seems to be stable and does not have evidence of active right foot infection or systemic symptoms.  He does have open wound on the right foot that would require care.  2-severe anemia multifactorial management per primary team  3-immobility with evolving decubitus skin changes  4-end-stage renal disease on dialysis per nephrology service via right IJ tunnel catheter  5-immobilization syndrome #6-diabetes  7-peripheral vascular disease  8-C. difficile diarrhea with colonization.        Recommendations/Discussions:  See my discussion and recommendations on 5/23/2025.  Continue with local wound care as clinically there is no evidence of active infection of the foot.  Continue follow-up as per podiatry recommendations.  Supportive care and management of other issues per primary team.  Continue oral vancomycin for 10 days.  Avoid broad-spectrum antibiotic therapy if possible.  Nicolasa Denny MD  6/2/2025  17:58 EDT    Parts of this note may be an electronic transcription/translation of spoken language to printed text using the Dragon dictation system.

## 2025-06-02 NOTE — PLAN OF CARE
Goal Outcome Evaluation:  Plan of Care Reviewed With: patient           Outcome Evaluation: Pre-cert obtained for [facility].  HD needs to be tomorrow AM before DC once order is placed for HD.  VSS, RA while awake. 2lpn while asleep.  A&Ox4.  Bedridden but will turns better for staff. Foot dressing changed as ordered.

## 2025-06-02 NOTE — PROGRESS NOTES
Name: Rock Maciel Jr. ADMIT: 2025   : 1945  PCP: Denise Dickson APRN    MRN: [id number] LOS: 12 days   AGE/SEX: 79 y.o. male  ROOM: [facility]     Subjective   Subjective   Feeling okay again today. No N/V/D/abd pain. Tolerating diet though doesn't like the food. Making a little more urine. No SOA or CP or palp. No F/C/NS.       Objective   Objective   Vital Signs  Temp:  [97.3 °F (36.3 °C)-97.9 °F (36.6 °C)] 97.5 °F (36.4 °C)  Heart Rate:  [72-80] 74  Resp:  [18] 18  BP: (121-153)/(48-89) 153/62  SpO2:  [92 %-98 %] 98 %  on  Flow (L/min) (Oxygen Therapy):  [2] 2;   Device (Oxygen Therapy): room air  Body mass index is 33.97 kg/m².    (No change in exam today)    Physical Exam  Vitals and nursing note reviewed.   Constitutional:       General: He is not in acute distress.     Appearance: He is ill-appearing (chronically). He is not toxic-appearing or diaphoretic.   HENT:      Head: Normocephalic.      Nose: Nose normal.      Mouth/Throat:      Mouth: Mucous membranes are moist.      Pharynx: Oropharynx is clear.   Eyes:      General: No scleral icterus.        Right eye: No discharge.         Left eye: No discharge.      Conjunctiva/sclera: Conjunctivae normal.   Cardiovascular:      Rate and Rhythm: Normal rate. Rhythm irregular.   Pulmonary:      Effort: Pulmonary effort is normal. No respiratory distress.      Breath sounds: Normal breath sounds. No wheezing or rales.   Abdominal:      General: Bowel sounds are normal. There is no distension.      Palpations: Abdomen is soft.      Tenderness: There is no abdominal tenderness.   Musculoskeletal:         General: Swelling and deformity (s/p bilateral toe amputations) present.      Cervical back: Neck supple.   Skin:     General: Skin is warm and dry.      Capillary Refill: Capillary refill takes less than 2 seconds.      Coloration: Skin is pale. Skin is not jaundiced.   Neurological:      General: No focal deficit present.      Mental Status:  He is alert. Mental status is at baseline.   Psychiatric:         Mood and Affect: Mood normal.         Behavior: Behavior normal.         Thought Content: Thought content normal.       Results Review     I reviewed the patient's new clinical results.  Results from last 7 days   Lab Units 06/02/25 0631 06/01/25 0505 05/31/25  0515 05/30/25  0557   WBC 10*3/mm3 5.67 6.77 8.29 7.81   HEMOGLOBIN g/dL 9.7* 10.4* 9.9* 9.5*   PLATELETS 10*3/mm3 261 241 231 204     Results from last 7 days   Lab Units 06/02/25  0631 06/01/25  0505 05/31/25  0515 05/30/25  0557   SODIUM mmol/L 135* 134* 136 138   POTASSIUM mmol/L 4.3 4.2 4.1 4.1   CHLORIDE mmol/L 102 102 102 104   CO2 mmol/L 25.9 22.5 23.0 25.0   BUN mg/dL 32.0* 20.0 24.0* 18.0   CREATININE mg/dL 3.26* 2.50* 2.91* 2.43*   GLUCOSE mg/dL 136* 123* 124* 112*   EGFR mL/min/1.73 18.4* 25.3* 21.1* 26.2*     Results from last 7 days   Lab Units 06/02/25 0631 06/01/25 0505 05/31/25  0515 05/30/25  0557   ALBUMIN g/dL 2.6* 2.6* 2.9* 2.9*   BILIRUBIN mg/dL 0.3  --   --   --    ALK PHOS U/L 88  --   --   --    AST (SGOT) U/L 14  --   --   --    ALT (SGPT) U/L 15  --   --   --      Results from last 7 days   Lab Units 06/02/25 0631 06/01/25 0505 05/31/25 0515 05/30/25  0557   CALCIUM mg/dL 8.8 8.5* 8.7 8.6   ALBUMIN g/dL 2.6* 2.6* 2.9* 2.9*   MAGNESIUM mg/dL 1.9 1.9 1.9 1.8   PHOSPHORUS mg/dL 3.9 2.6 3.1 2.4*       Glucose   Date/Time Value Ref Range Status   06/02/2025 1035 217 (H) 70 - 130 mg/dL Final   06/02/2025 0629 136 (H) 70 - 130 mg/dL Final   06/01/2025 2124 182 (H) 70 - 130 mg/dL Final   06/01/2025 1615 139 (H) 70 - 130 mg/dL Final   06/01/2025 1118 156 (H) 70 - 130 mg/dL Final   05/31/2025 2311 133 (H) 70 - 130 mg/dL Final   05/31/2025 1641 176 (H) 70 - 130 mg/dL Final       No radiology results for the last day    I have personally reviewed all medications:  Scheduled Medications  amiodarone, 200 mg, Oral, Daily  apixaban, 2.5 mg, Oral, BID  vitamin C, 250 mg, Oral,  Daily  atorvastatin, 20 mg, Oral, Nightly  bumetanide, 2 mg, Oral, TID  cetaphil, , Topical, Q12H  clobetasol propionate, 1 Application, Topical, Q12H  clopidogrel, 75 mg, Oral, Daily  epoetin vince/vince-epbx, 10,000 Units, Subcutaneous, Once per day on Monday Wednesday Friday  hydrALAZINE, 10 mg, Oral, TID  insulin lispro, 2-7 Units, Subcutaneous, 4x Daily AC & at Bedtime  [Held by provider] linagliptin, 5 mg, Oral, Daily  Menthol-Zinc Oxide, 1 Application, Topical, BID  metoprolol succinate XL, 25 mg, Oral, Daily  miconazole, 1 Application, Topical, Q12H  pantoprazole, 40 mg, Oral, Q AM  sodium chloride, 10 mL, Intravenous, Q12H  sodium chloride, 10 mL, Intravenous, Q12H  sodium chloride, 10 mL, Intravenous, Q12H  sodium hypochlorite, , Topical, BID  tamsulosin, 0.4 mg, Oral, Daily  vancomycin, 125 mg, Oral, Q6H    Infusions  Pharmacy Consult,     Diet  Diet: Regular/House, Cardiac, Diabetic; Healthy Heart (2-3 Na+); Consistent Carbohydrate; Fluid Consistency: Thin (IDDSI 0)    I have personally reviewed:  [x]  Laboratory   []  Microbiology   []  Radiology   [x]  EKG/Telemetry  []  Cardiology/Vascular   []  Pathology    []  Records       Assessment/Plan     Active Hospital Problems    Diagnosis  POA    **Gross hematuria [R31.0]  Yes    ESRD (end stage renal disease) [N18.6]  Yes    Moderate protein-calorie malnutrition [E44.0]  Yes    Hypoalbuminemia [E88.09]  Yes    Acute blood loss anemia [D62]  Yes    Chronic heart failure with preserved ejection fraction (HFpEF) [I50.32]  Yes    Unsteady gait when walking [R26.81]  Yes    Nocturnal hypoxemia [G47.34]  Yes    Persistent atrial fibrillation [I48.19]  Yes    S/P CABG x 2 [Z95.1]  Not Applicable    Amputated toe of right foot [S98.131A]  Yes    H/O aortic valve replacement with porcine valve [Z95.3]  Not Applicable    Type 2 diabetes mellitus with circulatory disorder, without long-term current use of insulin [E11.59]  Yes    SHASTA (obstructive sleep apnea) [G47.33]   Yes    Essential hypertension [I10]  Yes      Resolved Hospital Problems   No resolved problems to display.       80yo gentleman with recent right DFU, ESRD, DM2, HTN, SHASTA, porcine AVR, CAD/CABG, HFpEF, and AFib (Eliquis), who was admitted with gross hematuria and anemia.      Gross hematuria/Acute blood loss anemia: Followed by . Initially started on CBI and antiplatelet/anticoagulation was held. S/p cystoscopy with TURBT on 5/24 by Dr. Cornejo. Two areas were concerning for bleeding. Pathology neg for malignancy.  Underwent voiding trial and is now requiring intermittent catheterization. Continue Flomax. Plavix and Eliquis have been resumed. Outpatient follow-up with  planned in 1-2 weeks. Hgb stable now.  Heme positive stool: Status post EGD on 5/25 by Dr. Webb.  No acute bleeding was noted.  ESRD: Requiring HD TTS. Nephrology following.  C. difficile diarrhea with colonization: On vancomycin to complete 10d course per ID. No diarrhea and WBC wnl.  Chronic A-fib/HFpEF/Porcine AVR/CABG: Meds adjusted per Nephrology needs. Eliquis and Plavix were restarted when okay with . HRs fine on amiodarone and metoprolol.  Hypertension: BP acceptable on current meds.  DM2: A1c 5.2. Continue SSI. Tradjenta held currently--will restart.  Glipizide stopped.  Right DFU with osteo/abscess in March: Per ID he is currently stable having completed course of abx and now only requires local wound care and f/u with Podiatry as outpt.  SHASTA:  Uses CPAP at home but does not have here. Continue PRN nasal cannula O2 at HS as needed here. Start IS.       Eliquis (home med) for DVT prophylaxis.  Full code.  Discussed with patient.  Anticipate discharge to SNU facility once precert has been obtained.  Expected Discharge Date: 6/2/2025; Expected Discharge Time:       Ru Markham MD  [facility]  06/02/25  12:06 EDT

## 2025-06-02 NOTE — CASE MANAGEMENT/SOCIAL WORK
Continued Stay Note  [facility]     Patient Name: Rock Maciel Jr.  MRN: [id number]  Today's Date: 6/2/2025    Admit Date: 5/21/2025    Plan: Plan [facility] for HD and skilled care- pre cert obtained.   MARIANNE Solis RN   Discharge Plan       Row Name 06/02/25 1417       Plan    Plan Plan [facility] for HD and skilled care- pre cert obtained.   MARIANNE Solis RN    Plan Comments Per Mirella  ( [phone]) pre cert has been obtained.  Plan [facility] for HD and skilled care- pre cert obtained. MARIANNE Solis RN      Row Name 06/02/25 6415       Plan    Plan Plan [facility] for HD and skilled care- pre cert initiated.   MARIANNE Solis RN    Patient/Family in Agreement with Plan yes    Plan Comments Awaiting pre cert.  Mirella ( [phone]) is following for [facility].  Plan [facility] for HD and skilled care- pre cert initiated. MARIANNE Solis RN                   Discharge Codes    No documentation.                 Expected Discharge Date and Time       Expected Discharge Date Expected Discharge Time    Jun 2, 2025               Emily Solis RN

## 2025-06-02 NOTE — PLAN OF CARE
Goal Outcome Evaluation:      Pt. Alert, oriented x4, no voiced c/o pain, v/s stable, 02 sats down when got into sleep, 02 inhal. In placed @2l/m per n/c, kept o2 sats over 90% with oxygen, with dressing to right foot changed as ordered,  continued to do bladder scan for urinary retention , no s/s acute distress noted at this time

## 2025-06-02 NOTE — PROGRESS NOTES
"    FOLLOW UP NOTE    Nephrology Progress Note:     LOS: 12 days   Patient Care Team:  Denise Dickson APRN as PCP - General (Nurse Practitioner)  Azam Banegas Jr., MD as Consulting Physician (Cardiology)  Jamil Stevens MD as Consulting Physician (Nephrology)      Subjective     Interval History:   Renal follow up of prolonged KILLIAN/CKD 4 with probable ESRD  Chronic nexstage x 3 months  Last dialysis 5/31  Admitted with anemia and hematuria on 5/21/25  On room air  No chest pain  No SOB    Review of Systems:        Objective     Vital Signs  /62 (BP Location: Left arm, Patient Position: Lying)   Pulse 74   Temp 97.5 °F (36.4 °C) (Oral)   Resp 18   Ht 182.9 cm (72\")   Wt 114 kg (250 lb 7.1 oz)   SpO2 98%   BMI 33.97 kg/m²     Intake/Output  I/O last 3 completed shifts:  In: 1160 [P.O.:1160]  Out: 900 [Urine:900]  No intake/output data recorded.    Labs:  Lab Results   Component Value Date    GLUCOSE 136 (H) 06/02/2025    BUN 32.0 (H) 06/02/2025    CREATININE 3.26 (H) 06/02/2025     (L) 06/02/2025    K 4.3 06/02/2025     06/02/2025    CALCIUM 8.8 06/02/2025    PROTEINTOT 6.5 06/02/2025    ALBUMIN 2.6 (L) 06/02/2025    ALT 15 06/02/2025    AST 14 06/02/2025    ALKPHOS 88 06/02/2025    BILITOT 0.3 06/02/2025    GLOB 3.8 05/26/2025    AGRATIO 0.6 05/26/2025    BCR 9.8 06/02/2025    ANIONGAP 7.1 06/02/2025    EGFR 18.4 (L) 06/02/2025       CBC:      Lab 06/02/25  0631 06/01/25  0505 05/31/25  0515 05/30/25  0557 05/29/25  0551 05/28/25  0628 05/27/25  0656   WBC 5.67 6.77 8.29 7.81 7.25 7.01 8.02   HEMOGLOBIN 9.7* 10.4* 9.9* 9.5* 9.0* 8.6* 8.2*   HEMATOCRIT 31.1* 32.3* 30.2* 30.5* 28.2* 27.9* 26.7*   PLATELETS 261 241 231 204 200 191 222   NEUTROS ABS  --   --   --   --  5.27 5.23 6.20   IMMATURE GRANS (ABS)  --   --   --   --  0.06* 0.04 0.05   LYMPHS ABS  --   --   --   --  0.80 0.70 0.72   MONOS ABS  --   --   --   --  0.66 0.55 0.48   EOS ABS  --   --   --   --  0.44* 0.47* 0.54* "   MCV 96.0 95.6 94.4 96.5 96.2 97.6* 98.2*        Physical Exam:  Awake in NAD  NO icterus  No JVD  Right chest TDC  IRRR  Lungs clear  Abdomen soft positive bowel sounds  Right foot wrapped  Left foot TMA        Medication Review:  amiodarone, 200 mg, Oral, Daily  apixaban, 2.5 mg, Oral, BID  vitamin C, 250 mg, Oral, Daily  atorvastatin, 20 mg, Oral, Nightly  bumetanide, 2 mg, Oral, TID  cetaphil, , Topical, Q12H  clobetasol propionate, 1 Application, Topical, Q12H  clopidogrel, 75 mg, Oral, Daily  epoetin vince/vince-epbx, 10,000 Units, Subcutaneous, Once per day on Monday Wednesday Friday  hydrALAZINE, 10 mg, Oral, TID  insulin lispro, 2-7 Units, Subcutaneous, 4x Daily AC & at Bedtime  [Held by provider] linagliptin, 5 mg, Oral, Daily  Menthol-Zinc Oxide, 1 Application, Topical, BID  metoprolol succinate XL, 25 mg, Oral, Daily  miconazole, 1 Application, Topical, Q12H  pantoprazole, 40 mg, Oral, Q AM  sodium chloride, 10 mL, Intravenous, Q12H  sodium chloride, 10 mL, Intravenous, Q12H  sodium chloride, 10 mL, Intravenous, Q12H  sodium hypochlorite, , Topical, BID  tamsulosin, 0.4 mg, Oral, Daily  vancomycin, 125 mg, Oral, Q6H        Assessment & Plan   Prolonged ATN with prior CKD 4. Suspect ESRD now after 3 months of dialysis and no signs of recovery. Next dialysis tomorrow. Continue Nexstage on D/C.  HTN with BP in goal range.  DM with stable sugars  Anemia stable at 9.7 on chronic SHIRLEY.  Chronic Afib with controlled rate on amiodarone and eliquis  Hyperlipidemia on lipitor.  Right foot history of osteo with chronic wound followed by ID.  Hx of bladder mass resection following hematuria.  Urinary retention on flomax with self caths every one to two days. Patient follows with .   ASCAD/Valvular heart disease with prior CABG and AVR.   SHASTA stable on CPAP.    Gross hematuria    Essential hypertension    Type 2 diabetes mellitus with circulatory disorder, without long-term current use of insulin    SHASTA (obstructive  sleep apnea)    H/O aortic valve replacement with porcine valve    Amputated toe of right foot    S/P CABG x 2    Persistent atrial fibrillation    Nocturnal hypoxemia    Unsteady gait when walking    Chronic heart failure with preserved ejection fraction (HFpEF)    Hypoalbuminemia    Moderate protein-calorie malnutrition    Acute blood loss anemia    ESRD (end stage renal disease)      Quinn Yepez MD  06/02/25  09:36 EDT

## 2025-06-02 NOTE — CASE MANAGEMENT/SOCIAL WORK
Continued Stay Note  [facility]     Patient Name: Rock Maciel Jr.  MRN: [id number]  Today's Date: 6/2/2025    Admit Date: 5/21/2025    Plan: Plan [facility] for HD and skilled care- pre cert initiated.   MARIANNE Solis RN   Discharge Plan       Row Name 06/02/25 1131       Plan    Plan Plan [facility] for HD and skilled care- pre cert initiated.   MARIANNE Solis RN    Patient/Family in Agreement with Plan yes    Plan Comments Awaiting pre cert.  Mirella ( [phone]) is following for [facility].  Plan [facility] for HD and skilled care- pre cert initiated. MARIANNE Solsi RN                   Discharge Codes    No documentation.                 Expected Discharge Date and Time       Expected Discharge Date Expected Discharge Time    Jun 2, 2025               Emily Solis RN

## 2025-06-03 VITALS
DIASTOLIC BLOOD PRESSURE: 53 MMHG | RESPIRATION RATE: 16 BRPM | WEIGHT: 251.54 LBS | HEIGHT: 72 IN | OXYGEN SATURATION: 100 % | HEART RATE: 72 BPM | BODY MASS INDEX: 34.07 KG/M2 | TEMPERATURE: 98.6 F | SYSTOLIC BLOOD PRESSURE: 118 MMHG

## 2025-06-03 LAB
ALBUMIN SERPL-MCNC: 2.7 G/DL (ref 3.5–5.2)
ALP SERPL-CCNC: 93 U/L (ref 39–117)
ALT SERPL W P-5'-P-CCNC: 14 U/L (ref 1–41)
ANION GAP SERPL CALCULATED.3IONS-SCNC: 9.8 MMOL/L (ref 5–15)
AST SERPL-CCNC: 16 U/L (ref 1–40)
BILIRUB CONJ SERPL-MCNC: 0.1 MG/DL (ref 0–0.3)
BILIRUB INDIRECT SERPL-MCNC: 0.2 MG/DL
BILIRUB SERPL-MCNC: 0.3 MG/DL (ref 0–1.2)
BUN SERPL-MCNC: 39 MG/DL (ref 8–23)
BUN/CREAT SERPL: 9.8 (ref 7–25)
CALCIUM SPEC-SCNC: 8.7 MG/DL (ref 8.6–10.5)
CHLORIDE SERPL-SCNC: 103 MMOL/L (ref 98–107)
CO2 SERPL-SCNC: 24.2 MMOL/L (ref 22–29)
CREAT SERPL-MCNC: 3.96 MG/DL (ref 0.76–1.27)
DEPRECATED RDW RBC AUTO: 54 FL (ref 37–54)
EGFRCR SERPLBLD CKD-EPI 2021: 14.6 ML/MIN/1.73
ERYTHROCYTE [DISTWIDTH] IN BLOOD BY AUTOMATED COUNT: 15.4 % (ref 12.3–15.4)
GLUCOSE BLDC GLUCOMTR-MCNC: 135 MG/DL (ref 70–130)
GLUCOSE BLDC GLUCOMTR-MCNC: 178 MG/DL (ref 70–130)
GLUCOSE BLDC GLUCOMTR-MCNC: 203 MG/DL (ref 70–130)
GLUCOSE SERPL-MCNC: 120 MG/DL (ref 65–99)
HCT VFR BLD AUTO: 31.9 % (ref 37.5–51)
HGB BLD-MCNC: 10 G/DL (ref 13–17.7)
MAGNESIUM SERPL-MCNC: 1.9 MG/DL (ref 1.6–2.4)
MCH RBC QN AUTO: 30.7 PG (ref 26.6–33)
MCHC RBC AUTO-ENTMCNC: 31.3 G/DL (ref 31.5–35.7)
MCV RBC AUTO: 97.9 FL (ref 79–97)
PHOSPHATE SERPL-MCNC: 4.4 MG/DL (ref 2.5–4.5)
PLATELET # BLD AUTO: 268 10*3/MM3 (ref 140–450)
PMV BLD AUTO: 9.1 FL (ref 6–12)
POTASSIUM SERPL-SCNC: 4.8 MMOL/L (ref 3.5–5.2)
PROT SERPL-MCNC: 6.7 G/DL (ref 6–8.5)
QT INTERVAL: 440 MS
QTC INTERVAL: 500 MS
RBC # BLD AUTO: 3.26 10*6/MM3 (ref 4.14–5.8)
SODIUM SERPL-SCNC: 137 MMOL/L (ref 136–145)
WBC NRBC COR # BLD AUTO: 5.72 10*3/MM3 (ref 3.4–10.8)

## 2025-06-03 PROCEDURE — 80076 HEPATIC FUNCTION PANEL: CPT | Performed by: HOSPITALIST

## 2025-06-03 PROCEDURE — 85027 COMPLETE CBC AUTOMATED: CPT | Performed by: HOSPITALIST

## 2025-06-03 PROCEDURE — 83735 ASSAY OF MAGNESIUM: CPT | Performed by: INTERNAL MEDICINE

## 2025-06-03 PROCEDURE — 25010000002 HEPARIN (PORCINE) PER 1000 UNITS: Performed by: STUDENT IN AN ORGANIZED HEALTH CARE EDUCATION/TRAINING PROGRAM

## 2025-06-03 PROCEDURE — 80048 BASIC METABOLIC PNL TOTAL CA: CPT | Performed by: INTERNAL MEDICINE

## 2025-06-03 PROCEDURE — 82948 REAGENT STRIP/BLOOD GLUCOSE: CPT

## 2025-06-03 PROCEDURE — 63710000001 INSULIN LISPRO (HUMAN) PER 5 UNITS: Performed by: UROLOGY

## 2025-06-03 PROCEDURE — 84100 ASSAY OF PHOSPHORUS: CPT | Performed by: HOSPITALIST

## 2025-06-03 RX ORDER — SODIUM HYPOCHLORITE 1.25 MG/ML
10 SOLUTION TOPICAL 2 TIMES DAILY
Start: 2025-06-03

## 2025-06-03 RX ORDER — PANTOPRAZOLE SODIUM 40 MG/1
40 TABLET, DELAYED RELEASE ORAL
Start: 2025-06-04

## 2025-06-03 RX ORDER — VANCOMYCIN HYDROCHLORIDE 125 MG/1
125 CAPSULE ORAL EVERY 6 HOURS SCHEDULED
Qty: 4 CAPSULE | Refills: 0 | Status: SHIPPED | OUTPATIENT
Start: 2025-06-03 | End: 2025-06-04

## 2025-06-03 RX ORDER — VIT E ACET/GLY/DIMETH/WATER
LOTION (ML) TOPICAL EVERY 12 HOURS SCHEDULED
Start: 2025-06-03

## 2025-06-03 RX ORDER — OXYBUTYNIN CHLORIDE 5 MG/1
5 TABLET ORAL 3 TIMES DAILY PRN
Start: 2025-06-03

## 2025-06-03 RX ORDER — LIDOCAINE HYDROCHLORIDE 20 MG/ML
JELLY TOPICAL AS NEEDED
Start: 2025-06-03

## 2025-06-03 RX ORDER — HEPARIN SODIUM (PORCINE) LOCK FLUSH IV SOLN 100 UNIT/ML 100 UNIT/ML
3 SOLUTION INTRAVENOUS DAILY PRN
Start: 2025-06-03

## 2025-06-03 RX ORDER — ACYCLOVIR 800 MG/1
1 TABLET ORAL CONTINUOUS
Qty: 2 EACH | Refills: 1 | Status: SHIPPED | OUTPATIENT
Start: 2025-06-03

## 2025-06-03 RX ORDER — CLOBETASOL PROPIONATE 0.5 MG/G
1 CREAM TOPICAL EVERY 12 HOURS SCHEDULED
Start: 2025-06-03

## 2025-06-03 RX ORDER — HEPARIN SODIUM 1000 [USP'U]/ML
4000 INJECTION, SOLUTION INTRAVENOUS; SUBCUTANEOUS AS NEEDED
Start: 2025-06-03

## 2025-06-03 RX ORDER — TAMSULOSIN HYDROCHLORIDE 0.4 MG/1
0.4 CAPSULE ORAL DAILY
Start: 2025-06-04

## 2025-06-03 RX ORDER — ECHINACEA PURPUREA EXTRACT 125 MG
1 TABLET ORAL
Start: 2025-06-03

## 2025-06-03 RX ADMIN — OXYCODONE HYDROCHLORIDE AND ACETAMINOPHEN 250 MG: 500 TABLET ORAL at 09:17

## 2025-06-03 RX ADMIN — DAKIN'S SOLUTION 0.125% (QUARTER STRENGTH): 0.12 SOLUTION at 09:19

## 2025-06-03 RX ADMIN — Medication 10 ML: at 09:20

## 2025-06-03 RX ADMIN — VANCOMYCIN HYDROCHLORIDE 125 MG: 125 CAPSULE ORAL at 05:54

## 2025-06-03 RX ADMIN — HEPARIN SODIUM 4000 UNITS: 1000 INJECTION INTRAVENOUS; SUBCUTANEOUS at 16:04

## 2025-06-03 RX ADMIN — PANTOPRAZOLE SODIUM 40 MG: 40 TABLET, DELAYED RELEASE ORAL at 05:54

## 2025-06-03 RX ADMIN — CAMPHOR, MENTHOL: .5; .5 LOTION TOPICAL at 20:48

## 2025-06-03 RX ADMIN — Medication: at 09:20

## 2025-06-03 RX ADMIN — MENTHOL, ZINC OXIDE 1 APPLICATION: .44; 20.6 OINTMENT TOPICAL at 09:19

## 2025-06-03 RX ADMIN — VANCOMYCIN HYDROCHLORIDE 125 MG: 125 CAPSULE ORAL at 17:33

## 2025-06-03 RX ADMIN — INSULIN LISPRO 2 UNITS: 100 INJECTION, SOLUTION INTRAVENOUS; SUBCUTANEOUS at 21:35

## 2025-06-03 RX ADMIN — CLOBETASOL PROPIONATE CREAM USP, 0.05% 1 APPLICATION: 0.5 CREAM TOPICAL at 09:20

## 2025-06-03 RX ADMIN — AMIODARONE HYDROCHLORIDE 200 MG: 200 TABLET ORAL at 09:18

## 2025-06-03 RX ADMIN — CLOPIDOGREL BISULFATE 75 MG: 75 TABLET, FILM COATED ORAL at 09:17

## 2025-06-03 RX ADMIN — BUMETANIDE 2 MG: 1 TABLET ORAL at 05:54

## 2025-06-03 RX ADMIN — BUMETANIDE 2 MG: 1 TABLET ORAL at 17:33

## 2025-06-03 RX ADMIN — Medication: at 21:00

## 2025-06-03 RX ADMIN — INSULIN LISPRO 3 UNITS: 100 INJECTION, SOLUTION INTRAVENOUS; SUBCUTANEOUS at 12:41

## 2025-06-03 RX ADMIN — VANCOMYCIN HYDROCHLORIDE 125 MG: 125 CAPSULE ORAL at 23:36

## 2025-06-03 RX ADMIN — VANCOMYCIN HYDROCHLORIDE 125 MG: 125 CAPSULE ORAL at 12:41

## 2025-06-03 RX ADMIN — CLOBETASOL PROPIONATE CREAM USP, 0.05% 1 APPLICATION: 0.5 CREAM TOPICAL at 23:07

## 2025-06-03 RX ADMIN — BUMETANIDE 2 MG: 1 TABLET ORAL at 12:41

## 2025-06-03 RX ADMIN — ATORVASTATIN CALCIUM 20 MG: 20 TABLET, FILM COATED ORAL at 20:46

## 2025-06-03 RX ADMIN — ANTI-FUNGAL POWDER MICONAZOLE NITRATE TALC FREE 1 APPLICATION: 1.42 POWDER TOPICAL at 09:20

## 2025-06-03 RX ADMIN — ANTI-FUNGAL POWDER MICONAZOLE NITRATE TALC FREE 1 APPLICATION: 1.42 POWDER TOPICAL at 20:48

## 2025-06-03 RX ADMIN — LINAGLIPTIN 5 MG: 5 TABLET, FILM COATED ORAL at 09:17

## 2025-06-03 RX ADMIN — APIXABAN 2.5 MG: 2.5 TABLET, FILM COATED ORAL at 09:17

## 2025-06-03 RX ADMIN — APIXABAN 2.5 MG: 2.5 TABLET, FILM COATED ORAL at 20:46

## 2025-06-03 RX ADMIN — TAMSULOSIN HYDROCHLORIDE 0.4 MG: 0.4 CAPSULE ORAL at 09:17

## 2025-06-03 RX ADMIN — SALINE NASAL SPRAY 1 SPRAY: 1.5 SOLUTION NASAL at 20:49

## 2025-06-03 RX ADMIN — MENTHOL, ZINC OXIDE 1 APPLICATION: .44; 20.6 OINTMENT TOPICAL at 20:48

## 2025-06-03 NOTE — NURSING NOTE
Report was called to [facility].  Pt will be on the [address] [place] [address] location.  EMS will be between the time of 1930 and 2130.

## 2025-06-03 NOTE — NURSING NOTE
HD completed as ordered without complication.  1L removed.  VS stable throughout.  Report given to staff RN.

## 2025-06-03 NOTE — CASE MANAGEMENT/SOCIAL WORK
Case Management Discharge Note      Final Note: Pt discharged to [facility] for HD and skilled care.  MARIANNE Solis RN         Selected Continued Care - Admitted Since 5/21/2025       Destination Coordination complete.      Service Provider Services Address Phone Fax Patient Preferred    [facility] [address] [address] [phone] --              Durable Medical Equipment    No services have been selected for the patient.                Dialysis/Infusion    No services have been selected for the patient.                Home Medical Care    No services have been selected for the patient.                Therapy    No services have been selected for the patient.                Community Resources    No services have been selected for the patient.                Community & DME    No services have been selected for the patient.                    Selected Continued Care - Prior Encounters Includes continued care and service providers with selected services from prior encounters from 2/20/2025 to 6/3/2025      Discharged on 3/19/2025 Admission date: 3/3/2025 - Discharge disposition: Skilled Nursing Facility (DC - External)      Destination       Service Provider Services Address Phone Fax Patient Preferred    [facility] [address] [address] [phone] --                          Transportation Services  Ambulance: [facility]    Final Discharge Disposition Code: 03 - skilled nursing facility (SNF)

## 2025-06-03 NOTE — CASE MANAGEMENT/SOCIAL WORK
Post-Acute Authorization Submission      Post Acute Pre-Cert Documentation  Request Submitted by Facility - Type:: Hospital  Post-Acute Authorization Type Submitted:: SNF  Date Post Acute Pre-Cert Inititated per Facility: 05/30/25  Date Post Acute Pre-Cert Completed: 05/31/25  Accepting Facility: [facility] Discharge Date Requested: 05/31/25  All Clinicals Submitted?: Yes  Had Accepting Facility at Time of Submission: Yes  Response Received from Insurance?: Approval  Response Communicated to:: , Accepting Facility Liaison, Accepting Facility Auth Department  Authorization Number:: APPROVED [id number]  Post Acute Pre-Cert Initiated Comment: VALID TO ADMIT UPTO 6/4/25              Tee Khan PCT

## 2025-06-03 NOTE — PLAN OF CARE
Goal Outcome Evaluation:      Pt resting in bed. No c/o voiced. VSS. D/C to facility planned for 6/3. No s/s of distress. Plan of care ongoing

## 2025-06-03 NOTE — PROGRESS NOTES
"      FOLLOW UP NOTE      Name: Rock Maciel Jr. ADMIT: 2025   : 1945  PCP: Denise Dickson APRN    MRN: [id number] LOS: 13 days   AGE/SEX: 79 y.o. male  ROOM: [facility]     Date of Service: 6/3/2025                           CHIEF COMPLIANTS / REASON FOR FOLLOW UP          KILLIAN/CKD NXT stage 5 days/week    Subjective:      Resting comfortably.  No new complaints.  Might be going to rehab later today.    Review of Systems:       Negative except as above       OBJECTIVE                                                                        Exam:  /57 (BP Location: Left arm, Patient Position: Lying)   Pulse 80   Temp 97.3 °F (36.3 °C) (Oral)   Resp 18   Ht 182.9 cm (72\")   Wt 114 kg (251 lb 8.7 oz)   SpO2 99%   BMI 34.12 kg/m²   Intake/Output last 3 shifts:  I/O last 3 completed shifts:  In: -   Out: 2300 [Urine:2300]  Intake/Output this shift:  I/O this shift:  In: 240 [P.O.:240]  Out: -     GEN: Pleasant chronically ill elderly gentleman in no acute distress  RESP:Faint crackles heard  CVS: RRR, S1, S2+  ABD: soft, nontender, nondistended  NEURO: AAOX3  EXT: Minimal lower extremity edema  ACCESS: [facility]      Scheduled Meds:amiodarone, 200 mg, Oral, Daily  apixaban, 2.5 mg, Oral, BID  vitamin C, 250 mg, Oral, Daily  atorvastatin, 20 mg, Oral, Nightly  bumetanide, 2 mg, Oral, TID  cetaphil, , Topical, Q12H  clobetasol propionate, 1 Application, Topical, Q12H  clopidogrel, 75 mg, Oral, Daily  epoetin vince/vince-epbx, 10,000 Units, Subcutaneous, Once per day on   hydrALAZINE, 10 mg, Oral, TID  insulin lispro, 2-7 Units, Subcutaneous, 4x Daily AC & at Bedtime  linagliptin, 5 mg, Oral, Daily  Menthol-Zinc Oxide, 1 Application, Topical, BID  metoprolol succinate XL, 25 mg, Oral, Daily  miconazole, 1 Application, Topical, Q12H  pantoprazole, 40 mg, Oral, Q AM  sodium chloride, 10 mL, Intravenous, Q12H  sodium chloride, 10 mL, Intravenous, Q12H  sodium chloride, 10 mL, " Intravenous, Q12H  sodium hypochlorite, , Topical, BID  tamsulosin, 0.4 mg, Oral, Daily  vancomycin, 125 mg, Oral, Q6H      Continuous Infusions:Pharmacy Consult,       PRN Meds:  acetaminophen **OR** acetaminophen **OR** acetaminophen    calcium carbonate    camphor-menthol    dextrose    dextrose    docusate sodium    glucagon (human recombinant)    heparin (porcine)    heparin    hydrALAZINE    hydrOXYzine    lidocaine    melatonin    nitroglycerin    ondansetron ODT **OR** ondansetron    oxybutynin    oxyCODONE-acetaminophen    Pharmacy Consult    sodium chloride    sodium chloride    sodium chloride    sodium chloride    sodium chloride    sodium chloride    sodium chloride         Data Review:                                                                           Labs reviewed        Imaging:                                                                           Radiology reviewed           ASSESSMENT:                                                                                Gross hematuria    Essential hypertension    Type 2 diabetes mellitus with circulatory disorder, without long-term current use of insulin    SHASTA (obstructive sleep apnea)    H/O aortic valve replacement with porcine valve    Amputated toe of right foot    S/P CABG x 2    Persistent atrial fibrillation    Nocturnal hypoxemia    Unsteady gait when walking    Chronic heart failure with preserved ejection fraction (HFpEF)    Hypoalbuminemia    Moderate protein-calorie malnutrition    Acute blood loss anemia    ESRD (end stage renal disease)     CKD with hospitalization in March 2025 requiring HD due to KILLIAN likely ATN 2/2 rhabdomyolysis, prerenal insult related to diuretics. OM, now on next stage HD 5 days per week (MTWThF) with [facility].  On HD since 3/12.  Acute TYLER, hematuria  Urinary retention  Bladder mass s/p resection  A-fib with controlled rates.  History of HFpEF with pulmonary hypertension  Recent osteomyelitis and abscess  right foot  T2DM  HTN  A-fib  CAD  PVD   TTE 5/23/25 with EF 56-60%, mild transvalvular regurgitation in prosthetic AV, RVSP >55 mmHg      PLAN:                                                                            HD today.  Labs and volume stable.  Gross hematuria managed by urology.  Requiring CIC's.  Continue with diuretics.  Continue antihypertensives.  Phosphorus stable watch off binder.  Continue EPO with HD.  Status post EGD, GI input noted  Anemia stable  On p.o. vancomycin for C. difficile.    Awaiting rehab.

## 2025-06-03 NOTE — DISCHARGE SUMMARY
Patient Name: Rock Maciel Jr.  : 1945  MRN: [id number]    Date of Admission: 2025  Date of Discharge:  6/3/2025  Primary Care Physician: Denise Dickson APRN      Chief Complaint:   Abnormal Lab      Discharge Diagnoses     Active Hospital Problems    Diagnosis  POA    **Gross hematuria [R31.0]  Yes    ESRD (end stage renal disease) [N18.6]  Yes    Moderate protein-calorie malnutrition [E44.0]  Yes    Hypoalbuminemia [E88.09]  Yes    Acute blood loss anemia [D62]  Yes    Chronic heart failure with preserved ejection fraction (HFpEF) [I50.32]  Yes    Unsteady gait when walking [R26.81]  Yes    Nocturnal hypoxemia [G47.34]  Yes    Persistent atrial fibrillation [I48.19]  Yes    S/P CABG x 2 [Z95.1]  Not Applicable    Amputated toe of right foot [S98.131A]  Yes    H/O aortic valve replacement with porcine valve [Z95.3]  Not Applicable    Type 2 diabetes mellitus with circulatory disorder, without long-term current use of insulin [E11.59]  Yes    SHASTA (obstructive sleep apnea) [G47.33]  Yes    Essential hypertension [I10]  Yes      Resolved Hospital Problems   No resolved problems to display.        Hospital Course     Very pleasant 78yo gentleman with recent right DFU, ESRD, DM2, HTN, SHASTA, porcine AVR, CAD/CABG, HFpEF, and AFib (Eliquis), who was admitted with gross hematuria and anemia. Please see below for details of admission by problem:      Gross hematuria/Acute blood loss anemia: Followed by . Initially started on CBI and antiplatelet/anticoagulation was held. S/p cystoscopy with TURBT on  by Dr. Cornejo. Two areas were concerning for bleeding. Pathology neg for malignancy. Underwent voiding trial and is now requiring intermittent catheterization. Continue Flomax. Plavix and Eliquis have been resumed. Outpatient follow-up with  (Matt) planned in 1-2 weeks. Hgb stable now.  Heme positive stool: Status post EGD on  by Dr. Webb.  No acute bleeding was noted.  ESRD: Requiring HD  TTS. Nephrology followed.  C. difficile diarrhea with colonization: On vancomycin to complete 10d course per ID. No diarrhea and WBC wnl. PPI continued by Pharm as pt has h/o GI bleed.  Chronic A-fib/HFpEF/Porcine AVR/CABG: Meds adjusted per Nephrology needs. Eliquis and Plavix were restarted when okay with . HRs fine on amiodarone and metoprolol. Has f/u with Card on June 5th.  Hypertension: BP acceptable on current meds.  DM2: A1c 5.2. Covered with SSI. Tradjenta restarted. Glipizide stopped. F/u with PCP.  Right DFU with osteo/abscess in March: Per ID he is currently stable having completed course of abx and now only requires local wound care and f/u with Podiatry as outpt. F/u with Dr. Mchugh at next available appt. LORNA RLE.  SHASTA:  Uses CPAP at home but does not have here. Continue PRN nasal cannula O2 at HS as needed.              Eliquis (home med) sufficed for DVT prophylaxis.  Full code confirmed.  Discussed with patient and CCP.  Anticipate discharge to SNU facility later today after HD.    Day of Discharge     Subjective:  Feeling okay again today. No N/V/D/abd pain. Tolerating diet though doesn't like the food. Making a little more urine. Still requiring intermittent I/O cath. No SOA or CP or palp. No F/C/NS.     Physical Exam:  Temp:  [97.3 °F (36.3 °C)-98.6 °F (37 °C)] 97.3 °F (36.3 °C)  Heart Rate:  [67-80] 80  Resp:  [18] 18  BP: (138-150)/(50-59) 150/57  Body mass index is 34.12 kg/m².  Physical Exam  Vitals and nursing note reviewed.   Constitutional:       General: He is not in acute distress.     Appearance: He is ill-appearing (chronically). He is not toxic-appearing or diaphoretic.   Cardiovascular:      Rate and Rhythm: Normal rate. Rhythm irregular.   Pulmonary:      Effort: Pulmonary effort is normal. No respiratory distress.      Breath sounds: Normal breath sounds. No wheezing or rales.   Abdominal:      General: Bowel sounds are normal. There is no distension.      Palpations: Abdomen  is soft.      Tenderness: There is no abdominal tenderness.   Musculoskeletal:         General: Swelling and deformity (s/p bilateral toe amputations) present.      Cervical back: Neck supple.   Skin:     General: Skin is warm and dry.      Capillary Refill: Capillary refill takes less than 2 seconds.      Coloration: Skin is pale. Skin is not jaundiced.   Neurological:      General: No focal deficit present.      Mental Status: He is alert. Mental status is at baseline.   Psychiatric:         Mood and Affect: Mood normal.         Behavior: Behavior normal.         Thought Content: Thought content normal.        Consultants     Consult Orders (all) (From admission, onward)       Start     Ordered    05/22/25 0911  Inpatient Podiatry Consult  Once        Specialty:  Podiatry  Provider:  Jimenez Mchugh DPM    05/22/25 0911    05/22/25 0730  Inpatient Diabetes Educator Consult  Once        Comments: Refusing finger sticks- only earlobes   Provider:  (Not yet assigned)    05/22/25 0730    05/22/25 0709  Inpatient Palliative Care Team Consult  Once        Provider:  (Not yet assigned)    05/22/25 0044    05/22/25 0702  Inpatient Urology Consult  IN AM        Comments: Call in in the morning   Specialty:  Urology  Provider:  Prosper Cornejo MD    05/22/25 0021    05/22/25 0702  Inpatient Infectious Diseases Consult  IN AM        Comments: Wait to call in after 7am   Specialty:  Infectious Diseases  Provider:  Nicolasa Denny MD    05/22/25 0040    05/22/25 0045  Inpatient Advance Care Planning Consult  Once        Provider:  (Not yet assigned)    05/22/25 0044    05/22/25 0027  Inpatient Gastroenterology Consult  Once        Specialty:  Gastroenterology  Provider:  Darrian Webb MD    05/22/25 0026    05/21/25 1850  Inpatient Case Management  Consult  Once        Provider:  (Not yet assigned)    05/21/25 1849    05/21/25 1633  Inpatient Nutrition Consult  Once        Provider:  (Not yet assigned)     05/21/25 1632    05/21/25 1633  Inpatient Spiritual Care Consult  Once        Provider:  (Not yet assigned)    05/21/25 1632    05/21/25 1608  Inpatient Nephrology Consult  Once        Specialty:  Nephrology  Provider:  Maru Talavera MD    05/21/25 1608    05/21/25 1456  [facility] (on-call MD unless specified) Details  Once        Specialty:  Hospitalist  Provider:  (Not yet assigned)    05/21/25 1455                  Procedures     ESOPHAGOGASTRODUODENOSCOPY with biopsy    Imaging Results (All)       Procedure Component Value Units Date/Time    XR Chest 1 View [[id number]] Collected: 05/28/25 1344     Updated: 05/28/25 1351    Narrative:      XR CHEST 1 VW-     INDICATION: Discharge placement     COMPARISON: Chest radiographs dating back to 9/16/2015       Impression:      No focal consolidation. No pleural effusion or pneumothorax.  Likely skinfold overlying the left upper lobe with lung markings  extending peripherally. Right IJ dialysis catheter with tip overlying  the right atrium. Normal size cardiomediastinal silhouette with  prosthetic aortic valve. Nondisplaced median sternotomy wires.     This report was finalized on 5/28/2025 1:48 PM by Dr. Jd Maurer M.D on Workstation: [id number]       US Liver [[id number]] Collected: 05/25/25 1801     Updated: 05/25/25 1808    Narrative:      US LIVER-     Clinical: Evaluate for chronic liver disease     COMPARISON examination CT of the abdomen and pelvis from 11/25/2024     FINDINGS: The pancreas was completely obscured due to gas within the  overlying stomach and cannot be optimally evaluated. Technically  difficult examination, limited sonographic windows, only a portion of  the liver could be visualized. Echotexture normal to slightly greater  than anticipated, possible fatty infiltration. Antegrade flow within the  main portal vein.     The gallbladder appears enlarged, approximately 5.9 cm transverse  diameter, consistent with hydrops. There could be  sludge along the  dependent border, no shadowing stones or pericholecystic fluid  identified. No biliary duct dilatation with the CBD diameter of 3 mm.     The right kidney is 11.4 cm in length. Technically difficult to image,  no hydronephrosis, cortical echotexture within normal limits.     No free fluid is seen within the upper abdomen. The remainder is  unremarkable.     This report was finalized on 5/25/2025 6:05 PM by Dr. Marcus Lentz M.D  on Workstation: [id number]             Results for orders placed during the hospital encounter of 03/17/21    Duplex venous lower extremity bilateral CAR    Interpretation Summary  · There was deep venous valvular incompetence noted in the right common femoral and popliteal.  · There was superficial venous valvular incompetence noted in the right greater saphenous (below knee).  · Chronic right lower extremity superficial thrombophlebitis noted in the greater saphenous (below knee).  · There was superficial venous valvular incompetence noted in the left greater saphenous (below knee).  · Chronic left lower extremity superficial thrombophlebitis noted in the small saphenous.  · All other veins appeared normal bilaterally.    Results for orders placed during the hospital encounter of 05/21/25    Adult Transthoracic Echo Complete W/ Cont if Necessary Per Protocol    Interpretation Summary    Left ventricular ejection fraction appears to be 56 - 60%.    Left ventricular diastolic function was normal.    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 Mild transvalvular regurgitation is present in the prosthetic aortic valve.    Estimated right ventricular systolic pressure from tricuspid regurgitation is markedly elevated (>55 mmHg).    Pertinent Labs     Results from last 7 days   Lab Units 06/03/25  0448 06/02/25  0631 06/01/25  0505 05/31/25  0515   WBC 10*3/mm3 5.72 5.67 6.77 8.29   HEMOGLOBIN g/dL 10.0* 9.7* 10.4* 9.9*   PLATELETS 10*3/mm3 268 261 241 231     Results  "from last 7 days   Lab Units 06/03/25 0448 06/02/25 0631 06/01/25 0505 05/31/25  0515   SODIUM mmol/L 137 135* 134* 136   POTASSIUM mmol/L 4.8 4.3 4.2 4.1   CHLORIDE mmol/L 103 102 102 102   CO2 mmol/L 24.2 25.9 22.5 23.0   BUN mg/dL 39.0* 32.0* 20.0 24.0*   CREATININE mg/dL 3.96* 3.26* 2.50* 2.91*   GLUCOSE mg/dL 120* 136* 123* 124*   EGFR mL/min/1.73 14.6* 18.4* 25.3* 21.1*     Results from last 7 days   Lab Units 06/03/25 0448 06/02/25 0631 06/01/25 0505 05/31/25  0515   ALBUMIN g/dL 2.7* 2.6* 2.6* 2.9*   BILIRUBIN mg/dL 0.3 0.3  --   --    ALK PHOS U/L 93 88  --   --    AST (SGOT) U/L 16 14  --   --    ALT (SGPT) U/L 14 15  --   --      Results from last 7 days   Lab Units 06/03/25 0448 06/02/25 0631 06/01/25 0505 05/31/25  0515   CALCIUM mg/dL 8.7 8.8 8.5* 8.7   ALBUMIN g/dL 2.7* 2.6* 2.6* 2.9*   MAGNESIUM mg/dL 1.9 1.9 1.9 1.9   PHOSPHORUS mg/dL 4.4 3.9 2.6 3.1               Invalid input(s): \"LDLCALC\"          Test Results Pending at Discharge     Pending Results       None              Discharge Details        Discharge Medications        New Medications        Instructions Start Date   cetaphil lotion   Topical, Every 12 Hours Scheduled      clobetasol propionate 0.05 % cream  Commonly known as: TEMOVATE   1 Application, Topical, Every 12 Hours Scheduled      epoetin vince-epbx 34149 UNIT/ML injection  Commonly known as: RETACRIT   10,000 Units, Subcutaneous, 3 Times Weekly   Start Date: June 4, 2025     FreeStyle Delia 3 Sensor misc   1 each, Not Applicable, Continuous, Use to monitor blood glucose levels continuously.  Change sensors every 14 days      heparin (porcine) 1000 UNIT/ML injection   4,000 Units, Intracatheter, As Needed      heparin 100 UNIT/ML solution injection   300 Units, Intravenous, Daily PRN      Lidocaine HCl gel 2 % gel  Commonly known as: XYLOCAINE   Topical, As Needed      Menthol-Zinc Oxide 0.44-20.6 % ointment   1 Application, Topical, 2 Times Daily      miconazole 2 % " powder  Commonly known as: MICOTIN   1 Application, Topical, Every 12 Hours Scheduled      oxybutynin 5 MG tablet  Commonly known as: DITROPAN   5 mg, Oral, 3 Times Daily PRN      pantoprazole 40 MG EC tablet  Commonly known as: PROTONIX   40 mg, Oral, Every Early Morning   Start Date: June 4, 2025     sodium chloride 0.65 % nasal spray   1 spray, Nasal, Every 30 Minutes PRN      sodium hypochlorite 0.125 % solution topical solution 0.125%  Commonly known as: DAKIN'S 1/4 STRENGTH   10 mL, Topical, 2 Times Daily      tamsulosin 0.4 MG capsule 24 hr capsule  Commonly known as: FLOMAX   0.4 mg, Oral, Daily   Start Date: June 4, 2025     vancomycin 125 MG capsule  Commonly known as: VANCOCIN   125 mg, Oral, Every 6 Hours Scheduled             Continue These Medications        Instructions Start Date   acetaminophen 325 MG tablet  Commonly known as: TYLENOL   650 mg, Oral, Every 6 Hours PRN      amiodarone 200 MG tablet  Commonly known as: PACERONE   Take 1 tablet by mouth Daily.      atorvastatin 20 MG tablet  Commonly known as: LIPITOR   20 mg, Oral, Nightly, NEED TO SEE PROVIDER FOR FUTURE REFILLS.      bumetanide 2 MG tablet  Commonly known as: BUMEX   2 mg, Oral, 3 Times Daily      cholecalciferol 25 MCG (1000 UT) tablet  Commonly known as: VITAMIN D3   1,000 Units, Oral, Every 7 Days      clopidogrel 75 MG tablet  Commonly known as: PLAVIX   75 mg, Oral, Daily      Eliquis 2.5 MG tablet tablet  Generic drug: apixaban   2.5 mg, Oral, 2 Times Daily      hydrALAZINE 10 MG tablet  Commonly known as: APRESOLINE   10 mg, Oral, 3 Times Daily      insulin lispro 100 UNIT/ML injection  Commonly known as: HUMALOG/ADMELOG   2-9 Units, Subcutaneous, 4 Times Daily Before Meals & Nightly      melatonin 5 MG tablet tablet   5 mg, Oral, Nightly      metoprolol succinate XL 25 MG 24 hr tablet  Commonly known as: TOPROL-XL   25 mg, Oral, Daily      nitroglycerin 0.4 MG SL tablet  Commonly known as: NITROSTAT   0.4 mg, Sublingual,  Every 5 Minutes PRN, Take no more than 3 doses in 15 minutes.      ondansetron ODT 4 MG disintegrating tablet  Commonly known as: ZOFRAN-ODT   4 mg, Oral, Every 6 Hours PRN      polyethylene glycol 17 g packet  Commonly known as: MIRALAX   17 g, Daily      senna 8.6 MG tablet  Commonly known as: SENOKOT   1 tablet, Daily      Tradjenta 5 MG tablet tablet  Generic drug: linagliptin   Take 1 tablet by mouth Daily.      vitamin C 250 MG tablet  Commonly known as: ASCORBIC ACID   250 mg, Daily      Zinc 50 MG tablet              Stop These Medications      Accu-Chek Keshia Plus test strip  Generic drug: glucose blood     bisacodyl 10 MG suppository  Commonly known as: DULCOLAX     diphenhydrAMINE 25 mg capsule  Commonly known as: BENADRYL     glipizide 5 MG tablet  Commonly known as: GLUCOTROL     lidocaine 5 %  Commonly known as: LIDODERM     sevelamer 800 MG tablet  Commonly known as: RENVELA     terazosin 2 MG capsule  Commonly known as: HYTRIN     traZODone 50 MG tablet  Commonly known as: DESYREL              Allergies   Allergen Reactions    Ceclor [Cefaclor] Rash     Rash in his 50s; he tolerated piperacillin-tazobactam in March 2020       Discharge Disposition:  Skilled Nursing Facility (DC - External)      Discharge Diet:  Diet Order   Procedures    Diet: Regular/House, Cardiac, Diabetic; Healthy Heart (2-3 Na+); Consistent Carbohydrate; Fluid Consistency: Thin (IDDSI 0)       Discharge Activity:   As tolerated, NWB RLE per Pod recs    CODE STATUS:    Code Status and Medical Interventions: CPR (Attempt to Resuscitate); Full   Ordered at: 05/21/25 1632     Code Status (Patient has no pulse and is not breathing):    CPR (Attempt to Resuscitate)     Medical Interventions (Patient has pulse or is breathing):    Full       Future Appointments   Date Time Provider Department Center   6/5/2025  1:30 PM Jo Toney MD [facility]   6/18/2025 11:30 AM [facility] RM 3 BH [facility]   6/18/2025 12:30 PM  Jeaneth Bonds MD [facility]     Additional Instructions for the Follow-ups that You Need to Schedule       Discharge Follow-up with PCP   As directed       Currently Documented PCP:    Denise Dickson APRN    PCP Phone Number:    [phone]     Follow Up Details: Yessi NP (PCP) after dc from facility        Discharge Follow-up with Specialty: Dr. Mchugh (Pod) at next available appt   As directed      Specialty: Dr. Mchugh (Pod) at next available appt        Discharge Follow-up with Specified Provider: Dr. Toney (Card) on June 5th   As directed      To: Dr. Toney (Card) on June 5th        Discharge Follow-up with Specified Provider: Dr. Gutierrez () in 1-2 weeks   As directed      To: Dr. Gutierrez () in 1-2 weeks        Discharge Follow-up with Specified Provider: Dr. Bonds (Vasc Surg) on June 18th   As directed      To: Dr. Bonds (Vasc Surg) on June 18th               Contact information for follow-up providers       Denise Dickson APRN .    Specialties: Nurse Practitioner, Internal Medicine, Family Medicine  Why: Yessi NP (PCP) after dc from facility  Contact information:  [address] [facility] [id number] [phone]                       Contact information for after-discharge care       Destination       [facility] .    Service: Skilled Nursing  Contact information:  [address] [facility] [address] [phone]                                   Additional Instructions for the Follow-ups that You Need to Schedule       Discharge Follow-up with PCP   As directed       Currently Documented PCP:    Denise Dickson APRN    PCP Phone Number:    [phone]     Follow Up Details: Yessi NP (PCP) after dc from facility        Discharge Follow-up with Specialty: Dr. Mchugh (Pod) at next available appt   As directed      Specialty: Dr. Mchugh (Pod) at next available appt        Discharge Follow-up with Specified Provider:  Dr. Toney (Card) on June 5th   As directed      To: Dr. Toney (Card) on June 5th        Discharge Follow-up with Specified Provider: Dr. Gutierrez (JOSE) in 1-2 weeks   As directed      To: Dr. Gutierrez (JOSE) in 1-2 weeks        Discharge Follow-up with Specified Provider: Dr. Bonds (Vasc Surg) on June 18th   As directed      To: Dr. Bonds (Vasc Surg) on June 18th            Time Spent on Discharge:  Greater than 30 minutes      Ru Markham MD  [facility]  06/03/25  10:35 EDT

## 2025-06-03 NOTE — PLAN OF CARE
Goal Outcome Evaluation:  Plan of Care Reviewed With: patient           Outcome Evaluation: Pt is having HD in the room prior to DC to [facility].  VSS, RA while awake, 2lpn via n/c while asleep.  IV to be removed.  Will call report.

## 2025-06-03 NOTE — CASE MANAGEMENT/SOCIAL WORK
Continued Stay Note  [facility]     Patient Name: Rock Maciel Jr.  MRN: [id number]  Today's Date: 6/3/2025    Admit Date: 5/21/2025    Plan: Plan [facility] for HD and skilled care- pre cert obtained.  MARIANNE Solis RN   Discharge Plan       Row Name 06/03/25 1213       Plan    Plan Plan [facility] for HD and skilled care- pre cert obtained.  MARIANNE Solis RN    Patient/Family in Agreement with Plan yes    Plan Comments Spoke with pt at bedside.  Per Ivette ( [phone]) pt has a bed and can be accepted at [facility] today.  Pt needs HD completed prior to discharge.   Plan [facility] for HD and skilled care - pre cert obtained.  MARIANNE Solis RN                   Discharge Codes    No documentation.                 Expected Discharge Date and Time       Expected Discharge Date Expected Discharge Time    Jaime 3, 2025               Emily Solis RN

## 2025-06-04 ENCOUNTER — TELEPHONE (OUTPATIENT)
Dept: CARDIOLOGY | Facility: CLINIC | Age: 80
End: 2025-06-04

## 2025-06-04 NOTE — TELEPHONE ENCOUNTER
Caller: CLARITZA    Relationship to patient: [facility] call back number: [phone] ASK     Type of visit: HOSPITAL FU    Requested date: TBD     If rescheduling, when is the original appointment: 06.05.25     Additional notes:[facility] CALLED TO MOVE APPOINTMENT DUE TO TRANSPORTATION NEEDS FIRST AVAILABLE IS 06.23.25 WHICH IS OUTSIDE THE TIME FRAME FOR THE FU. PLEASE CALL AND ADVISE.

## 2025-06-10 NOTE — PLAN OF CARE
Goal Outcome Evaluation:  Plan of Care Reviewed With: patient        Progress: no change  Outcome Evaluation: VSS; A&Ox4. Pt received up from the ER this am. Pt room air; sat 94, requires 2LNC while sleeping; CPAP @ home. Pt walks with a walker @ baseline. Pt lives alone. Pt w/o c/o pain during the night. Pt remains SR w BBB; rates in 80s. Pt SBP 140s. Pt expresses no additional needs at this time. Plan of care is ongoing.                              bed rails

## 2025-06-18 ENCOUNTER — OFFICE VISIT (OUTPATIENT)
Age: 80
End: 2025-06-18
Payer: MEDICARE

## 2025-06-18 ENCOUNTER — HOSPITAL ENCOUNTER (OUTPATIENT)
Facility: HOSPITAL | Age: 80
Discharge: HOME | End: 2025-06-18
Admitting: STUDENT IN AN ORGANIZED HEALTH CARE EDUCATION/TRAINING PROGRAM
Payer: MEDICARE

## 2025-06-18 VITALS
WEIGHT: 251 LBS | DIASTOLIC BLOOD PRESSURE: 70 MMHG | SYSTOLIC BLOOD PRESSURE: 132 MMHG | RESPIRATION RATE: 17 BRPM | BODY MASS INDEX: 34 KG/M2 | HEIGHT: 72 IN

## 2025-06-18 DIAGNOSIS — N18.6 ESRD (END STAGE RENAL DISEASE): Primary | ICD-10-CM

## 2025-06-18 DIAGNOSIS — Z99.2 DEPENDENCE ON RENAL DIALYSIS: ICD-10-CM

## 2025-06-18 PROCEDURE — 1160F RVW MEDS BY RX/DR IN RCRD: CPT | Performed by: STUDENT IN AN ORGANIZED HEALTH CARE EDUCATION/TRAINING PROGRAM

## 2025-06-18 PROCEDURE — 3078F DIAST BP <80 MM HG: CPT | Performed by: STUDENT IN AN ORGANIZED HEALTH CARE EDUCATION/TRAINING PROGRAM

## 2025-06-18 PROCEDURE — 99215 OFFICE O/P EST HI 40 MIN: CPT | Performed by: STUDENT IN AN ORGANIZED HEALTH CARE EDUCATION/TRAINING PROGRAM

## 2025-06-18 PROCEDURE — 93985 DUP-SCAN HEMO COMPL BI STD: CPT

## 2025-06-18 PROCEDURE — 3075F SYST BP GE 130 - 139MM HG: CPT | Performed by: STUDENT IN AN ORGANIZED HEALTH CARE EDUCATION/TRAINING PROGRAM

## 2025-06-18 PROCEDURE — 1159F MED LIST DOCD IN RCRD: CPT | Performed by: STUDENT IN AN ORGANIZED HEALTH CARE EDUCATION/TRAINING PROGRAM

## 2025-06-18 RX ORDER — SODIUM CHLORIDE 0.9 % (FLUSH) 0.9 %
10 SYRINGE (ML) INJECTION EVERY 12 HOURS SCHEDULED
Status: CANCELLED | OUTPATIENT
Start: 2025-06-18

## 2025-06-18 RX ORDER — SODIUM CHLORIDE 9 MG/ML
40 INJECTION, SOLUTION INTRAVENOUS AS NEEDED
Status: CANCELLED | OUTPATIENT
Start: 2025-06-18

## 2025-06-18 RX ORDER — SODIUM CHLORIDE 0.9 % (FLUSH) 0.9 %
10 SYRINGE (ML) INJECTION AS NEEDED
Status: CANCELLED | OUTPATIENT
Start: 2025-06-18

## 2025-06-18 NOTE — PROGRESS NOTES
Chief Complaint  Hemodialysis Access    Subjective        Rock MENDOZA Raheem Burgos presents to [facility] VASCULAR SURGERY  History of Present Illness  79 y.o. male returns for follow up of ESRD on HD.  He had a tunneled catheter placed on 3/11/25.   He has not had any renal recovery so he is here to discuss long term indwelling AV access.  He has never been on dialysis before.  He is right hand dominant.  He has never had a pacer, defibrillator, or port before.  He is on dialysis Tu/Th/S.  He denies any arm swelling.        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  Added automatically from request for surgery [id number]      Gout     H/O aortic valve replacement with porcine valve 09/10/2018    Hiatal hernia     History of MRSA infection     RIGHT FOOT 2010    Hyperlipidemia     Hypertension     LAFB (left anterior fascicular block) 05/17/2016    MRSA (methicillin resistant Staphylococcus aureus) infection 03/08/2025    Murmur     Neuropathic arthropathy 05/17/2016    Overview:   2015 IMO UPDATE  Overview:    2015 IMO UPDATE      Nocturnal hypoxemia 12/01/2024    Nonrheumatic aortic valve stenosis 01/30/2017    Persistent atrial fibrillation 11/07/2024    Pneumothorax on right     S/P CABG x 2 05/06/2019 July 2018      Sleep apnea     pt wears CPAP at night    Status post left inguinal hernia repair, follow-up exam 01/05/2025    Traumatic rhabdomyolysis 03/03/2025    Type 2 diabetes mellitus with circulatory disorder, without long-term current use of insulin 05/17/2016    Unsteady gait when walking 01/05/2025    Wellness examination 06/24/2015    Annual Wellness Visit       Past Surgical History:   Procedure Laterality Date    ARTERY SURGERY Bilateral     carotid    BONE EXCISION LEG Right 3/10/2025    Procedure: BONE EXCISION LOWER EXTERMITY, RIGHT;  Surgeon: Jimenez Mchugh DPM;  Location: [facility] OR;  Service: Podiatry;  Laterality: Right;    CARDIAC CATHETERIZATION N/A 7/20/2018    Procedure: Left Heart Cath;  Surgeon: Jo Toney MD;  Location: [facility] CATH INVASIVE LOCATION;  Service: Cardiovascular    CARDIAC CATHETERIZATION N/A 7/20/2018    Procedure: Coronary angiography;  Surgeon: Jo Toney MD;  [facility];  Service: Cardiovascular    CAROTID ENDARTERECTOMY Bilateral     COLONOSCOPY  2008    CORONARY ARTERY BYPASS GRAFT WITH AORTIC VALVE REPAIR/REPLACEMENT N/A 7/23/2018    Procedure: INTRAOPERATIVE ALEX, MIDLINE STERNOTOMY, CORONARY ARTERY BYPASS GRAFTING X 3 USING ENDOSCOPICALLY HARVESTED LEFT GREATER SAPHENOUS VEIN,  AORTIC VALVE REPLACEMENT USING 25MM LOPEZ II ULTRA PORCINE VALVE, PRP;  Surgeon: Phong Posey MD;  Location: [facility] OR;  Service: Cardiothoracic    CYSTOSCOPY WITH CLOT EVACUATION N/A 5/24/2025    Procedure: CYSTOSCOPY WITH CLOT EVACUATION, RESECTION OF BLADDER MASS AND PLACEMENT OF CONTINOUS BLADDER IRRIGATION CATHETER;  Surgeon: Prosper Cornejo MD;  Location: [facility] MAIN OR;  Service: Urology;  Laterality: N/A;     ENDOSCOPY N/A 5/25/2025    Procedure: ESOPHAGOGASTRODUODENOSCOPY with biopsy;  Surgeon: Darrian Webb MD;  Location: [facility] ENDOSCOPY;  Service: Gastroenterology;  Laterality: N/A;  congested gastropathy    FOOT SURGERY Right 2010    5th digit removal    FOOT SURGERY Left 2011    1 digit removed    INCISION AND DRAINAGE LEG Right 3/28/2020    Procedure: DEBRIDMENT OF RIGHT CALF;  Surgeon: Ross Pineda MD;  Location: [facility] MAIN OR;  Service: Vascular;  Laterality: Right;    INCISION AND DRAINAGE LEG Right 3/10/2025    Procedure: INCISION AND DRAINAGE LOWER EXTREMITY;  Surgeon: Jimenez Mchugh DPM;  Location: [facility] MAIN OR;  Service: Podiatry;  Laterality: Right;    INGUINAL HERNIA REPAIR Left 11/29/2024    Procedure: INGUINAL HERNIA REPAIR LAPAROSCOPIC WITH DAVINCI ROBOT;  Surgeon: Phong Lazo MD;  Location: [facility] MAIN OR;  Service: Robotics - DaVinci;  Laterality: Left;    INSERTION HEMODIALYSIS CATHETER N/A 3/11/2025    Procedure: HEMODIALYSIS CATHETER INSERTION;  Surgeon: Jeaneth Bonds MD;  Location: [facility] MAIN OR;  Service: Vascular;  Laterality: N/A;    QUADRICEPS TENDON REPAIR Left 5/14/2019    Procedure: Left QUADRICEPS TENDON REPAIR;  Surgeon: Camilo Hunter MD;  Location: [facility] OR;  Service: Orthopedics    THORACENTESIS Right 11/21/2016    THORACOSCOPY Right 5/8/2017    Procedure: BRONCHOSCOPY, RIGHT VAT,  TOTAL DECORTICATION RIGHT LUNG, PLEURAL BX, PLACEMENT SUBPLEURAL PAIN CAATHETERS X2;  Surgeon: Donald Orlando III, MD;  Location: [facility] OR;  Service:     TONSILECTOMY, ADENOIDECTOMY, BILATERAL MYRINGOTOMY AND TUBES      WOUND DEBRIDEMENT Right 3/13/2025    Procedure: DEBRIDEMENT FOOT, RIGHT;  Surgeon: Jimenez Mchugh DPM;  Location: [facility] MAIN OR;  Service: Podiatry;  Laterality: Right;       Family History   Problem Relation Age of Onset    Hypertension Father     Malig Hyperthermia Neg Hx          Social History     Tobacco Use    Smoking status:  Never     Passive exposure: Never    Smokeless tobacco: Never   Vaping Use    Vaping status: Never Used   Substance Use Topics    Alcohol use: Not Currently     Alcohol/week: 7.0 standard drinks of alcohol     Types: 7 Drinks containing 0.5 oz of alcohol per week    Drug use: Never       Allergies: Ceclor [cefaclor]    Prior to Admission medications    Medication Sig Start Date End Date Taking? Authorizing Provider   acetaminophen (TYLENOL) 325 MG tablet Take 2 tablets by mouth Every 6 (Six) Hours As Needed for Mild Pain. 11/30/24  Yes Bishop Chakraborty MD   amiodarone (PACERONE) 200 MG tablet Take 1 tablet by mouth Daily. 1/9/25  Yes Jo Toney MD   apixaban (ELIQUIS) 2.5 MG tablet tablet Take 1 tablet by mouth 2 (Two) Times a Day. Indications: Atrial Fibrillation 11/15/24  Yes Apurva Smith APRN   atorvastatin (LIPITOR) 20 MG tablet Take 1 tablet by mouth Every Night. NEED TO SEE PROVIDER FOR FUTURE REFILLS. 2/7/25  Yes Denise Dickson APRN   bumetanide (BUMEX) 2 MG tablet Take 1 tablet by mouth 3 (Three) Times a Day. 3/19/25  Yes Magdalena Diamond MD   cetaphil (CETAPHIL) lotion Apply  topically to the appropriate area as directed Every 12 (Twelve) Hours. 6/3/25  Yes Ru Markham MD   Cholecalciferol 25 MCG (1000 UT) tablet Take 1 tablet by mouth Every 7 (Seven) Days.   Yes Shivam Smith MD   clobetasol propionate (TEMOVATE) 0.05 % cream Apply 1 Application topically to the appropriate area as directed Every 12 (Twelve) Hours. 6/3/25  Yes Ru Markham MD   clopidogrel (PLAVIX) 75 MG tablet Take 1 tablet by mouth Daily. 10/16/24  Yes Denise Dickson APRN   Continuous Glucose Sensor (FreeStyle Delia 3 Sensor) misc Use 1 each Continuous. Use to monitor blood glucose levels continuously.  Change sensors every 14 days 6/3/25  Yes Ru Markham MD   epoetin vince-epbx (RETACRIT) 68543 UNIT/ML injection Inject 1 mL under the skin into the appropriate area as directed 3  (Three) Times a Week. Indications: ESRD on Dialysis 6/4/25  Yes Ru Markham MD   heparin 100 UNIT/ML solution injection Infuse 3 mL into a venous catheter Daily As Needed for Line Care (Flush Each Lumen Daily When Not in Continuous Use - [place] Do NOT Require Heparin). 6/3/25  Yes Ru Markham MD   Heparin Sodium, Porcine, (heparin, porcine,) 1000 UNIT/ML injection 4 mL by Intracatheter route As Needed (HD catheter lock). Indications: HD catheter 6/3/25  Yes Ru Markham MD   hydrALAZINE (APRESOLINE) 10 MG tablet Take 1 tablet by mouth 3 (Three) Times a Day. 3/19/25  Yes Magdalena Diamond MD   insulin lispro (HUMALOG/ADMELOG) 100 UNIT/ML injection Inject 2-9 Units under the skin into the appropriate area as directed 4 (Four) Times a Day Before Meals & at Bedtime. 3/19/25  Yes Magdalena Diamond MD   Lidocaine HCl gel (XYLOCAINE) 2 % gel Apply  topically to the appropriate area as directed As Needed for Mild Pain. 6/3/25  Yes Ru Markham MD   linagliptin (Tradjenta) 5 MG tablet tablet Take 1 tablet by mouth Daily. 1/9/25  Yes Denise Dickson APRN   melatonin 5 MG tablet tablet Take 1 tablet by mouth Every Night. 3/19/25  Yes Magdalena Diamond MD   Menthol-Zinc Oxide 0.44-20.6 % ointment Apply 1 Application topically to the appropriate area as directed 2 (Two) Times a Day. 6/3/25  Yes Ru Markham MD   metoprolol succinate XL (TOPROL-XL) 25 MG 24 hr tablet Take 1 tablet by mouth Daily. 11/15/24  Yes Apurva Smith APRN   miconazole (MICOTIN) 2 % powder Apply 1 Application topically to the appropriate area as directed Every 12 (Twelve) Hours. 6/3/25  Yes Ru Markham MD   nitroglycerin (NITROSTAT) 0.4 MG SL tablet Place 1 tablet under the tongue Every 5 (Five) Minutes As Needed for Chest Pain (Only if SBP Greater Than 100). Take no more than 3 doses in 15 minutes. 11/15/24  Yes Apurva Smith APRN   ondansetron ODT (ZOFRAN-ODT) 4 MG disintegrating tablet Take 1 tablet by  "mouth Every 6 (Six) Hours As Needed for Nausea or Vomiting. 3/19/25  Yes Magdalena Diamond MD   oxybutynin (DITROPAN) 5 MG tablet Take 1 tablet by mouth 3 (Three) Times a Day As Needed (bladder spasms). 6/3/25  Yes Ru Markham MD   pantoprazole (PROTONIX) 40 MG EC tablet Take 1 tablet by mouth Every Morning. 6/4/25  Yes Ru Markham MD   polyethylene glycol (MIRALAX) 17 g packet Take 17 g by mouth Daily.   Yes Shivam Smith MD   senna 8.6 MG tablet Take 1 tablet by mouth Daily.   Yes Shivam Smith MD   sodium chloride 0.65 % nasal spray Administer 1 spray into the nostril(s) as directed by provider Every 30 (Thirty) Minutes As Needed for Congestion. 6/3/25  Yes Ru Markham MD   sodium hypochlorite (DAKIN'S 1/4 STRENGTH) 0.125 % solution topical solution 0.125% Apply 10 mL topically to the appropriate area as directed 2 (Two) Times a Day. 6/3/25  Yes Ru Markham MD   tamsulosin (FLOMAX) 0.4 MG capsule 24 hr capsule Take 1 capsule by mouth Daily. 6/4/25  Yes Ru Markham MD   vitamin C (ASCORBIC ACID) 250 MG tablet Take 1 tablet by mouth Daily.   Yes Shivam Smith MD   Zinc 50 MG tablet  2/19/22  Yes Shivam Smith MD         Objective   Vital Signs:  /70 (BP Location: Left arm, Patient Position: Sitting, Cuff Size: Adult)   Resp 17   Ht 182 cm (71.65\")   Wt 114 kg (251 lb)   BMI 34.37 kg/m²   Estimated body mass index is 34.37 kg/m² as calculated from the following:    Height as of this encounter: 182 cm (71.65\").    Weight as of this encounter: 114 kg (251 lb).               Physical Exam  Vitals reviewed.   Constitutional:       General: He is not in acute distress.     Appearance: Normal appearance. He is obese.   HENT:      Head: Normocephalic and atraumatic.      Right Ear: External ear normal.      Left Ear: External ear normal.      Nose: Nose normal.      Mouth/Throat:      Mouth: Mucous membranes are moist.      Pharynx: Oropharynx is clear. "   Eyes:      Extraocular Movements: Extraocular movements intact.      Conjunctiva/sclera: Conjunctivae normal.      Pupils: Pupils are equal, round, and reactive to light.   Cardiovascular:      Rate and Rhythm: Normal rate and regular rhythm.      Pulses:           Carotid pulses are 2+ on the right side and 2+ on the left side.       Radial pulses are 2+ on the right side and 2+ on the left side.      Comments: 2+ brachial pulses bilaterally, no arm swelling, no dilated superficial veins of the chest or upper extremities  Pulmonary:      Comments: Non labored respirations on room air, equal chest rise  Abdominal:      General: Abdomen is flat. There is no distension.      Palpations: Abdomen is soft.   Musculoskeletal:      Cervical back: Normal range of motion. No rigidity.      Right lower leg: No edema.      Left lower leg: No edema.   Skin:     General: Skin is warm and dry.      Capillary Refill: Capillary refill takes less than 2 seconds.   Neurological:      General: No focal deficit present.      Mental Status: He is alert and oriented to person, place, and time.   Psychiatric:         Mood and Affect: Mood normal.         Behavior: Behavior normal.        Result Review :    The following data was reviewed by: Jeaneth Bonds MD on 06/18/2025:  Common labs          6/1/2025    05:05 6/2/2025    06:31 6/3/2025    04:48   Common Labs   Glucose 123  136  120    BUN 20.0  32.0  39.0    Creatinine 2.50  3.26  3.96    Sodium 134  135  137    Potassium 4.2  4.3  4.8    Chloride 102  102  103    Calcium 8.5  8.8  8.7    Albumin 2.6  2.6  2.7    Total Bilirubin  0.3  0.3    Alkaline Phosphatase  88  93    AST (SGOT)  14  16    ALT (SGPT)  15  14    WBC 6.77  5.67  5.72    Hemoglobin 10.4  9.7  10.0    Hematocrit 32.3  31.1  31.9    Platelets 241  261  268      CMP          6/1/2025    05:05 6/2/2025    06:31 6/3/2025    04:48   CMP   Glucose 123  136  120    BUN 20.0  32.0  39.0    Creatinine 2.50  3.26  3.96     EGFR 25.3  18.4  14.6    Sodium 134  135  137    Potassium 4.2  4.3  4.8    Chloride 102  102  103    Calcium 8.5  8.8  8.7    Total Protein  6.5  6.7    Albumin 2.6  2.6  2.7    Total Bilirubin  0.3  0.3    Alkaline Phosphatase  88  93    AST (SGOT)  14  16    ALT (SGPT)  15  14    BUN/Creatinine Ratio 8.0  9.8  9.8    Anion Gap 9.5  7.1  9.8      CBC          6/1/2025    05:05 6/2/2025    06:31 6/3/2025    04:48   CBC   WBC 6.77  5.67  5.72    RBC 3.38  3.24  3.26    Hemoglobin 10.4  9.7  10.0    Hematocrit 32.3  31.1  31.9    MCV 95.6  96.0  97.9    MCH 30.8  29.9  30.7    MCHC 32.2  31.2  31.3    RDW 15.3  15.3  15.4    Platelets 241  261  268      BMP          6/1/2025    05:05 6/2/2025    06:31 6/3/2025    04:48   BMP   BUN 20.0  32.0  39.0    Creatinine 2.50  3.26  3.96    Sodium 134  135  137    Potassium 4.2  4.3  4.8    Chloride 102  102  103    CO2 22.5  25.9  24.2    Calcium 8.5  8.8  8.7          Last Echo  Results for orders placed during the hospital encounter of 05/21/25    Adult Transthoracic Echo Complete W/ Cont if Necessary Per Protocol    Interpretation Summary    Left ventricular ejection fraction appears to be 56 - 60%.    Left ventricular diastolic function was normal.    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 Mild transvalvular regurgitation is present in the prosthetic aortic valve.    Estimated right ventricular systolic pressure from tricuspid regurgitation is markedly elevated (>55 mmHg).      Results for orders placed during the hospital encounter of 10/03/24    Adult Transthoracic Echo Complete W/ Cont if Necessary Per Protocol    Interpretation Summary    Left ventricular ejection fraction appears to be 61 - 65%.    Left ventricular diastolic function was indeterminate.    The left atrial cavity is moderately dilated.    Left atrial volume is moderately increased.    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    Estimated right ventricular systolic pressure from  tricuspid regurgitation is moderately elevated (45-55 mmHg).       Last Stress  Results for orders placed in visit on 10/30/23    Stress Test With Myocardial Perfusion One Day    Interpretation Summary    Findings consistent with an equivocal ECG stress test.    Left ventricular ejection fraction is normal (Calculated EF = 50%).    Myocardial perfusion imaging indicates a small-sized infarct located in the lateral wall with no significant ischemia noted.    Impressions are consistent with an intermediate risk study.    Asymptomatic for chest pain. Specificity of study reduced secondary to baseline BBBB, however ECG is not suggestive of ischemia. ECG is  equivocal for  suggestion of ischemia  Ectopy: rare PVC at baseline,  Blood pressure response:  appropriate. Baseline Hypertensive 170/70, Peak (post Lexiscan)170/70, Recovery: 142//65  Pharmacologic study due to inability to tolerate increasing speed and grade of treadmill due to orthopedic, mobility  issues and Beta-blocker therapy.  Unable to participate in low level exercise due to  poor mobility, poor weight bearing and fall risk    Supervised by:  Apurva PASTRANA.      Results for orders placed in visit on 09/10/18    Treadmill Stress Test    Interpretation Summary  · Equivocal ECG evidence of myocardial ischemia.Negative clinical evidence of myocardial ischemia. Findings consistent with an equivocal ECG stress test. Submaximal Stress Test.  · . Asymptomatic for chest pain    Submaximal Stress Test. Asymptomatic for chest pain for heart rate achieved. Specificity of study reduced secondary to motion artifact and inability to achieve target heart rate.  Ectopy: none.  Blood pressure response:  appropriate. Exercise tolerance poor.       Last Cath  No results found for this or any previous visit.                Assessment and Plan     Diagnoses and all orders for this visit:    1. ESRD (end stage renal disease) (Primary)  -     Case Request;  Standing  -     Follow Anesthesia Guidelines / Protocol; Future  -     Follow Anesthesia Guidelines / Protocol; Standing  -     Verify NPO Status; Standing  -     SCD (Sequential Compression Device) - To Be Placed on Patient in Pre-Op; Standing  -     Clip Operative Site; Standing  -     Verify / Perform Chlorhexidine Skin Prep; Standing  -     Provide Patient With Instructions on NPO Status; Future  -     Provide Chlorhexidine Skin Prep Wipes and Instructions; Future  -     CBC & Differential; Standing  -     Basic Metabolic Panel; Standing  -     Place Sequential Compression Device; Standing  -     Maintain Sequential Compression Device; Standing  -     Insert Peripheral IV; Standing  -     Saline Lock & Maintain IV Access; Standing  -     sodium chloride 0.9 % flush 10 mL  -     sodium chloride 0.9 % flush 10 mL  -     sodium chloride 0.9 % infusion 40 mL  -     Instruct Patient to Hold Medications (Specify); Future  -     ceFAZolin (ANCEF) 2,000 mg in sodium chloride 0.9 % 100 mL IVPB  -     Case Request    Rock Maciel is an 79 year old male who returns for follow up of ESRD on HD.  He has not had renal recovery so he is here to discuss indwelling access.  He does not have any risk factors for central venous stenosis.  He had vein mapping on 6/19/25 that showed bilateral superficial thrombophlebitis of the basilic veins and bilateral forearm cephalic veins.  The left cephalic vein is adequate for AVF creation.  Patient has been recommended to undergo hemodialysis access surgery.  This is a major surgery with known risk factors.  The patient understands the inherent risks associated with hemodialysis access surgery.  These include, but not are not limited to,  stroke, heart attack, bleeding, infection, need for secondary surgery/intervention, hemodialysis access steal phenomenon, ischemic monomelic neuropathy, failure to mature, and even death.  The patient also understands the nature of hemodialysis access and  chronic kidney disease/end-stage renal disease(a chronic illness that has progressed to requiring surgical dialysis access creation for life-sustaining treatment).  He will hold his DOAC for 2 days pre operatively.  He understands and would like to proceed.          Follow Up     No follow-ups on file.  Patient was given instructions and counseling regarding his condition or for health maintenance advice. Please see specific information pulled into the AVS if appropriate.   Any information in this note that has been copied and pasted was reviewed and edited to reflect today's exam.  This note was created using Dragon dictation software.  I have reviewed the note for accuracy and made corrections as needed.  Please note that some errors may still exist, please contact me with any questions or concerns.

## 2025-06-18 NOTE — H&P (VIEW-ONLY)
Chief Complaint  Hemodialysis Access    Subjective        Rock MENDOZA Raheem Burgos presents to [facility]  History of Present Illness  79 y.o. male returns for follow up of ESRD on HD.  He had a tunneled catheter placed on 3/11/25.   He has not had any renal recovery so he is here to discuss long term indwelling AV access.  He has never been on dialysis before.  He is right hand dominant.  He has never had a pacer, defibrillator, or port before.  He is on dialysis Tu/Th/S.  He denies any arm swelling.        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  Added automatically from request for surgery [id number]      Gout     H/O aortic valve replacement with porcine valve 09/10/2018    Hiatal hernia     History of MRSA infection     RIGHT FOOT 2010    Hyperlipidemia     Hypertension     LAFB (left anterior fascicular block) 05/17/2016    MRSA (methicillin resistant Staphylococcus aureus) infection 03/08/2025    Murmur     Neuropathic arthropathy 05/17/2016    Overview:   2015 IMO UPDATE  Overview:    2015 IMO UPDATE      Nocturnal hypoxemia 12/01/2024    Nonrheumatic aortic valve stenosis 01/30/2017    Persistent atrial fibrillation 11/07/2024    Pneumothorax on right     S/P CABG x 2 05/06/2019 July 2018      Sleep apnea     pt wears CPAP at night    Status post left inguinal hernia repair, follow-up exam 01/05/2025    Traumatic rhabdomyolysis 03/03/2025    Type 2 diabetes mellitus with circulatory disorder, without long-term current use of insulin 05/17/2016    Unsteady gait when walking 01/05/2025    Wellness examination 06/24/2015    Annual Wellness Visit       Past Surgical History:   Procedure Laterality Date    ARTERY SURGERY Bilateral     carotid    BONE EXCISION LEG Right 3/10/2025    Procedure: BONE EXCISION LOWER EXTERMITY, RIGHT;  Surgeon: Jimenez Mchugh DPM;  Location: [facility] OR;  Service: Podiatry;  Laterality: Right;    CARDIAC CATHETERIZATION N/A 7/20/2018    Procedure: Left Heart Cath;  Surgeon: Jo Toney MD;  Location: [facility] CATH INVASIVE LOCATION;  Service: Cardiovascular    CARDIAC CATHETERIZATION N/A 7/20/2018    Procedure: Coronary angiography;  Surgeon: Jo Toney MD;  Location: [facility] CATH INVASIVE LOC[facility];  Service: Cardiovascular    CAROTID ENDARTERECTOMY Bilateral     COLONOSCOPY  2008    CORONARY ARTERY BYPASS GRAFT WITH AORTIC VALVE REPAIR/REPLACEMENT N/A 7/23/2018    Procedure: INTRAOPERATIVE ALEX, MIDLINE STERNOTOMY, CORONARY ARTERY BYPASS GRAFTING X 3 USING ENDOSCOPICALLY HARVESTED LEFT GREATER SAPHENOUS VEIN,  AORTIC VALVE REPLACEMENT USING 25MM LOPEZ II ULTRA PORCINE VALVE, PRP;  Surgeon: Phong Posey MD;  Location: [facility] OR;  Service: Cardiothoracic    CYSTOSCOPY WITH CLOT EVACUATION N/A 5/24/2025    Procedure: CYSTOSCOPY WITH CLOT EVACUATION, RESECTION OF BLADDER MASS AND PLACEMENT OF CONTINOUS BLADDER IRRIGATION CATHETER;  Surgeon: Prosper Cornejo MD;  Location: [facility] MAIN OR;  Service: Urology;  Laterality: N/A;     ENDOSCOPY N/A 5/25/2025    Procedure: ESOPHAGOGASTRODUODENOSCOPY with biopsy;  Surgeon: Darrian Webb MD;  Location: [facility] ENDOSCOPY;  Service: Gastroenterology;  Laterality: N/A;  congested gastropathy    FOOT SURGERY Right 2010    5th digit removal    FOOT SURGERY Left 2011    1 digit removed    INCISION AND DRAINAGE LEG Right 3/28/2020    Procedure: DEBRIDMENT OF RIGHT CALF;  Surgeon: Ross Pineda MD;  Location: [facility] MAIN OR;  Service: Vascular;  Laterality: Right;    INCISION AND DRAINAGE LEG Right 3/10/2025    Procedure: INCISION AND DRAINAGE LOWER EXTREMITY;  Surgeon: Jimenez Mchugh DPM;  Location: [facility] MAIN OR;  Service: Podiatry;  Laterality: Right;    INGUINAL HERNIA REPAIR Left 11/29/2024    Procedure: INGUINAL HERNIA REPAIR LAPAROSCOPIC WITH DAVINCI ROBOT;  Surgeon: Phong Lazo MD;  Location: [facility] MAIN OR;  Service: Robotics - DaVinci;  Laterality: Left;    INSERTION HEMODIALYSIS CATHETER N/A 3/11/2025    Procedure: HEMODIALYSIS CATHETER INSERTION;  Surgeon: Jeaneth Bonds MD;  Location: [facility] MAIN OR;  Service: Vascular;  Laterality: N/A;    QUADRICEPS TENDON REPAIR Left 5/14/2019    Procedure: Left QUADRICEPS TENDON REPAIR;  Surgeon: Camilo Hunter MD;  Location: [facility] OR;  Service: Orthopedics    THORACENTESIS Right 11/21/2016    THORACOSCOPY Right 5/8/2017    Procedure: BRONCHOSCOPY, RIGHT VAT,  TOTAL DECORTICATION RIGHT LUNG, PLEURAL BX, PLACEMENT SUBPLEURAL PAIN CAATHETERS X2;  Surgeon: Donald Orlando III, MD;  Location: [facility] OR;  Service:     TONSILECTOMY, ADENOIDECTOMY, BILATERAL MYRINGOTOMY AND TUBES      WOUND DEBRIDEMENT Right 3/13/2025    Procedure: DEBRIDEMENT FOOT, RIGHT;  Surgeon: Jimenez Mchugh DPM;  Location: [facility] MAIN OR;  Service: Podiatry;  Laterality: Right;       Family History   Problem Relation Age of Onset    Hypertension Father     Malig Hyperthermia Neg Hx          Social History     Tobacco Use    Smoking status:  Never     Passive exposure: Never    Smokeless tobacco: Never   Vaping Use    Vaping status: Never Used   Substance Use Topics    Alcohol use: Not Currently     Alcohol/week: 7.0 standard drinks of alcohol     Types: 7 Drinks containing 0.5 oz of alcohol per week    Drug use: Never       Allergies: Ceclor [cefaclor]    Prior to Admission medications    Medication Sig Start Date End Date Taking? Authorizing Provider   acetaminophen (TYLENOL) 325 MG tablet Take 2 tablets by mouth Every 6 (Six) Hours As Needed for Mild Pain. 11/30/24  Yes Bishop Chakraborty MD   amiodarone (PACERONE) 200 MG tablet Take 1 tablet by mouth Daily. 1/9/25  Yes Jo Toney MD   apixaban (ELIQUIS) 2.5 MG tablet tablet Take 1 tablet by mouth 2 (Two) Times a Day. Indications: Atrial Fibrillation 11/15/24  Yes Apurva Smith APRN   atorvastatin (LIPITOR) 20 MG tablet Take 1 tablet by mouth Every Night. NEED TO SEE PROVIDER FOR FUTURE REFILLS. 2/7/25  Yes Denise Dickson APRN   bumetanide (BUMEX) 2 MG tablet Take 1 tablet by mouth 3 (Three) Times a Day. 3/19/25  Yes Magdalena Diamodn MD   cetaphil (CETAPHIL) lotion Apply  topically to the appropriate area as directed Every 12 (Twelve) Hours. 6/3/25  Yes Ru Markham MD   Cholecalciferol 25 MCG (1000 UT) tablet Take 1 tablet by mouth Every 7 (Seven) Days.   Yes Shivam Smith MD   clobetasol propionate (TEMOVATE) 0.05 % cream Apply 1 Application topically to the appropriate area as directed Every 12 (Twelve) Hours. 6/3/25  Yes Ru Markham MD   clopidogrel (PLAVIX) 75 MG tablet Take 1 tablet by mouth Daily. 10/16/24  Yes Denise Dickson APRN   Continuous Glucose Sensor (FreeStyle Delia 3 Sensor) misc Use 1 each Continuous. Use to monitor blood glucose levels continuously.  Change sensors every 14 days 6/3/25  Yes Ru Markham MD   epoetin vince-epbx (RETACRIT) 45916 UNIT/ML injection Inject 1 mL under the skin into the appropriate area as directed 3  (Three) Times a Week. Indications: ESRD on Dialysis 6/4/25  Yes Ru Markham MD   heparin 100 UNIT/ML solution injection Infuse 3 mL into a venous catheter Daily As Needed for Line Care (Flush Each Lumen Daily When Not in Continuous Use - [place] Do NOT Require Heparin). 6/3/25  Yes Ru Markham MD   Heparin Sodium, Porcine, (heparin, porcine,) 1000 UNIT/ML injection 4 mL by Intracatheter route As Needed (HD catheter lock). Indications: HD catheter 6/3/25  Yes Ru Markham MD   hydrALAZINE (APRESOLINE) 10 MG tablet Take 1 tablet by mouth 3 (Three) Times a Day. 3/19/25  Yes Magdalena Diamond MD   insulin lispro (HUMALOG/ADMELOG) 100 UNIT/ML injection Inject 2-9 Units under the skin into the appropriate area as directed 4 (Four) Times a Day Before Meals & at Bedtime. 3/19/25  Yes Magdalena Diamond MD   Lidocaine HCl gel (XYLOCAINE) 2 % gel Apply  topically to the appropriate area as directed As Needed for Mild Pain. 6/3/25  Yes Ru Markham MD   linagliptin (Tradjenta) 5 MG tablet tablet Take 1 tablet by mouth Daily. 1/9/25  Yes Denies Dickson APRN   melatonin 5 MG tablet tablet Take 1 tablet by mouth Every Night. 3/19/25  Yes Magdalena Diamond MD   Menthol-Zinc Oxide 0.44-20.6 % ointment Apply 1 Application topically to the appropriate area as directed 2 (Two) Times a Day. 6/3/25  Yes Ru Markham MD   metoprolol succinate XL (TOPROL-XL) 25 MG 24 hr tablet Take 1 tablet by mouth Daily. 11/15/24  Yes Apurva Smith APRN   miconazole (MICOTIN) 2 % powder Apply 1 Application topically to the appropriate area as directed Every 12 (Twelve) Hours. 6/3/25  Yes Ru Markham MD   nitroglycerin (NITROSTAT) 0.4 MG SL tablet Place 1 tablet under the tongue Every 5 (Five) Minutes As Needed for Chest Pain (Only if SBP Greater Than 100). Take no more than 3 doses in 15 minutes. 11/15/24  Yes Apurva Smith APRN   ondansetron ODT (ZOFRAN-ODT) 4 MG disintegrating tablet Take 1 tablet by  "mouth Every 6 (Six) Hours As Needed for Nausea or Vomiting. 3/19/25  Yes Magdalena Diamond MD   oxybutynin (DITROPAN) 5 MG tablet Take 1 tablet by mouth 3 (Three) Times a Day As Needed (bladder spasms). 6/3/25  Yes Ru Markham MD   pantoprazole (PROTONIX) 40 MG EC tablet Take 1 tablet by mouth Every Morning. 6/4/25  Yes Ru Markham MD   polyethylene glycol (MIRALAX) 17 g packet Take 17 g by mouth Daily.   Yes Shivam Smith MD   senna 8.6 MG tablet Take 1 tablet by mouth Daily.   Yes Shivam Smith MD   sodium chloride 0.65 % nasal spray Administer 1 spray into the nostril(s) as directed by provider Every 30 (Thirty) Minutes As Needed for Congestion. 6/3/25  Yes Ru Markham MD   sodium hypochlorite (DAKIN'S 1/4 STRENGTH) 0.125 % solution topical solution 0.125% Apply 10 mL topically to the appropriate area as directed 2 (Two) Times a Day. 6/3/25  Yes Ru Markham MD   tamsulosin (FLOMAX) 0.4 MG capsule 24 hr capsule Take 1 capsule by mouth Daily. 6/4/25  Yes Ru Markham MD   vitamin C (ASCORBIC ACID) 250 MG tablet Take 1 tablet by mouth Daily.   Yes Shivam Smith MD   Zinc 50 MG tablet  2/19/22  Yes Shivam Smith MD         Objective   Vital Signs:  /70 (BP Location: Left arm, Patient Position: Sitting, Cuff Size: Adult)   Resp 17   Ht 182 cm (71.65\")   Wt 114 kg (251 lb)   BMI 34.37 kg/m²   Estimated body mass index is 34.37 kg/m² as calculated from the following:    Height as of this encounter: 182 cm (71.65\").    Weight as of this encounter: 114 kg (251 lb).               Physical Exam  Vitals reviewed.   Constitutional:       General: He is not in acute distress.     Appearance: Normal appearance. He is obese.   HENT:      Head: Normocephalic and atraumatic.      Right Ear: External ear normal.      Left Ear: External ear normal.      Nose: Nose normal.      Mouth/Throat:      Mouth: Mucous membranes are moist.      Pharynx: Oropharynx is clear. "   Eyes:      Extraocular Movements: Extraocular movements intact.      Conjunctiva/sclera: Conjunctivae normal.      Pupils: Pupils are equal, round, and reactive to light.   Cardiovascular:      Rate and Rhythm: Normal rate and regular rhythm.      Pulses:           Carotid pulses are 2+ on the right side and 2+ on the left side.       Radial pulses are 2+ on the right side and 2+ on the left side.      Comments: 2+ brachial pulses bilaterally, no arm swelling, no dilated superficial veins of the chest or upper extremities  Pulmonary:      Comments: Non labored respirations on room air, equal chest rise  Abdominal:      General: Abdomen is flat. There is no distension.      Palpations: Abdomen is soft.   Musculoskeletal:      Cervical back: Normal range of motion. No rigidity.      Right lower leg: No edema.      Left lower leg: No edema.   Skin:     General: Skin is warm and dry.      Capillary Refill: Capillary refill takes less than 2 seconds.   Neurological:      General: No focal deficit present.      Mental Status: He is alert and oriented to person, place, and time.   Psychiatric:         Mood and Affect: Mood normal.         Behavior: Behavior normal.        Result Review :    The following data was reviewed by: Jeaneth Bonds MD on 06/18/2025:  Common labs          6/1/2025    05:05 6/2/2025    06:31 6/3/2025    04:48   Common Labs   Glucose 123  136  120    BUN 20.0  32.0  39.0    Creatinine 2.50  3.26  3.96    Sodium 134  135  137    Potassium 4.2  4.3  4.8    Chloride 102  102  103    Calcium 8.5  8.8  8.7    Albumin 2.6  2.6  2.7    Total Bilirubin  0.3  0.3    Alkaline Phosphatase  88  93    AST (SGOT)  14  16    ALT (SGPT)  15  14    WBC 6.77  5.67  5.72    Hemoglobin 10.4  9.7  10.0    Hematocrit 32.3  31.1  31.9    Platelets 241  261  268      CMP          6/1/2025    05:05 6/2/2025    06:31 6/3/2025    04:48   CMP   Glucose 123  136  120    BUN 20.0  32.0  39.0    Creatinine 2.50  3.26  3.96     EGFR 25.3  18.4  14.6    Sodium 134  135  137    Potassium 4.2  4.3  4.8    Chloride 102  102  103    Calcium 8.5  8.8  8.7    Total Protein  6.5  6.7    Albumin 2.6  2.6  2.7    Total Bilirubin  0.3  0.3    Alkaline Phosphatase  88  93    AST (SGOT)  14  16    ALT (SGPT)  15  14    BUN/Creatinine Ratio 8.0  9.8  9.8    Anion Gap 9.5  7.1  9.8      CBC          6/1/2025    05:05 6/2/2025    06:31 6/3/2025    04:48   CBC   WBC 6.77  5.67  5.72    RBC 3.38  3.24  3.26    Hemoglobin 10.4  9.7  10.0    Hematocrit 32.3  31.1  31.9    MCV 95.6  96.0  97.9    MCH 30.8  29.9  30.7    MCHC 32.2  31.2  31.3    RDW 15.3  15.3  15.4    Platelets 241  261  268      BMP          6/1/2025    05:05 6/2/2025    06:31 6/3/2025    04:48   BMP   BUN 20.0  32.0  39.0    Creatinine 2.50  3.26  3.96    Sodium 134  135  137    Potassium 4.2  4.3  4.8    Chloride 102  102  103    CO2 22.5  25.9  24.2    Calcium 8.5  8.8  8.7          Last Echo  Results for orders placed during the hospital encounter of 05/21/25    Adult Transthoracic Echo Complete W/ Cont if Necessary Per Protocol    Interpretation Summary    Left ventricular ejection fraction appears to be 56 - 60%.    Left ventricular diastolic function was normal.    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 Mild transvalvular regurgitation is present in the prosthetic aortic valve.    Estimated right ventricular systolic pressure from tricuspid regurgitation is markedly elevated (>55 mmHg).      Results for orders placed during the hospital encounter of 10/03/24    Adult Transthoracic Echo Complete W/ Cont if Necessary Per Protocol    Interpretation Summary    Left ventricular ejection fraction appears to be 61 - 65%.    Left ventricular diastolic function was indeterminate.    The left atrial cavity is moderately dilated.    Left atrial volume is moderately increased.    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    Estimated right ventricular systolic pressure from  tricuspid regurgitation is moderately elevated (45-55 mmHg).       Last Stress  Results for orders placed in visit on 10/30/23    Stress Test With Myocardial Perfusion One Day    Interpretation Summary    Findings consistent with an equivocal ECG stress test.    Left ventricular ejection fraction is normal (Calculated EF = 50%).    Myocardial perfusion imaging indicates a small-sized infarct located in the lateral wall with no significant ischemia noted.    Impressions are consistent with an intermediate risk study.    Asymptomatic for chest pain. Specificity of study reduced secondary to baseline BBBB, however ECG is not suggestive of ischemia. ECG is  equivocal for  suggestion of ischemia  Ectopy: rare PVC at baseline,  Blood pressure response:  appropriate. Baseline Hypertensive 170/70, Peak (post Lexiscan)170/70, Recovery: 142//65  Pharmacologic study due to inability to tolerate increasing speed and grade of treadmill due to orthopedic, mobility  issues and Beta-blocker therapy.  Unable to participate in low level exercise due to  poor mobility, poor weight bearing and fall risk    Supervised by:  Apurva PASTRANA.      Results for orders placed in visit on 09/10/18    Treadmill Stress Test    Interpretation Summary  · Equivocal ECG evidence of myocardial ischemia.Negative clinical evidence of myocardial ischemia. Findings consistent with an equivocal ECG stress test. Submaximal Stress Test.  · . Asymptomatic for chest pain    Submaximal Stress Test. Asymptomatic for chest pain for heart rate achieved. Specificity of study reduced secondary to motion artifact and inability to achieve target heart rate.  Ectopy: none.  Blood pressure response:  appropriate. Exercise tolerance poor.       Last Cath  No results found for this or any previous visit.                Assessment and Plan     Diagnoses and all orders for this visit:    1. ESRD (end stage renal disease) (Primary)  -     Case Request;  Standing  -     Follow Anesthesia Guidelines / Protocol; Future  -     Follow Anesthesia Guidelines / Protocol; Standing  -     Verify NPO Status; Standing  -     SCD (Sequential Compression Device) - To Be Placed on Patient in Pre-Op; Standing  -     Clip Operative Site; Standing  -     Verify / Perform Chlorhexidine Skin Prep; Standing  -     Provide Patient With Instructions on NPO Status; Future  -     Provide Chlorhexidine Skin Prep Wipes and Instructions; Future  -     CBC & Differential; Standing  -     Basic Metabolic Panel; Standing  -     Place Sequential Compression Device; Standing  -     Maintain Sequential Compression Device; Standing  -     Insert Peripheral IV; Standing  -     Saline Lock & Maintain IV Access; Standing  -     sodium chloride 0.9 % flush 10 mL  -     sodium chloride 0.9 % flush 10 mL  -     sodium chloride 0.9 % infusion 40 mL  -     Instruct Patient to Hold Medications (Specify); Future  -     ceFAZolin (ANCEF) 2,000 mg in sodium chloride 0.9 % 100 mL IVPB  -     Case Request    Rock Maciel is an 79 year old male who returns for follow up of ESRD on HD.  He has not had renal recovery so he is here to discuss indwelling access.  He does not have any risk factors for central venous stenosis.  He had vein mapping on 6/19/25 that showed bilateral superficial thrombophlebitis of the basilic veins and bilateral forearm cephalic veins.  The left cephalic vein is adequate for AVF creation.  Patient has been recommended to undergo hemodialysis access surgery.  This is a major surgery with known risk factors.  The patient understands the inherent risks associated with hemodialysis access surgery.  These include, but not are not limited to,  stroke, heart attack, bleeding, infection, need for secondary surgery/intervention, hemodialysis access steal phenomenon, ischemic monomelic neuropathy, failure to mature, and even death.  The patient also understands the nature of hemodialysis access and  chronic kidney disease/end-stage renal disease(a chronic illness that has progressed to requiring surgical dialysis access creation for life-sustaining treatment).  He will hold his DOAC for 2 days pre operatively.  He understands and would like to proceed.          Follow Up     No follow-ups on file.  Patient was given instructions and counseling regarding his condition or for health maintenance advice. Please see specific information pulled into the AVS if appropriate.   Any information in this note that has been copied and pasted was reviewed and edited to reflect today's exam.  This note was created using Dragon dictation software.  I have reviewed the note for accuracy and made corrections as needed.  Please note that some errors may still exist, please contact me with any questions or concerns.

## 2025-06-23 LAB
BH CV UPPER VENOUS LEFT AXILLARY AUGMENT: NORMAL
BH CV UPPER VENOUS LEFT AXILLARY COMPRESS: NORMAL
BH CV UPPER VENOUS LEFT AXILLARY PHASIC: NORMAL
BH CV UPPER VENOUS LEFT AXILLARY SPONT: NORMAL
BH CV UPPER VENOUS LEFT BASILIC FOREARM COLOR: 1
BH CV UPPER VENOUS LEFT BASILIC FOREARM COMPRESS: NORMAL
BH CV UPPER VENOUS LEFT BASILIC FOREARM THROMBUS: NORMAL
BH CV UPPER VENOUS LEFT BASILIC UPPER COLOR: 1
BH CV UPPER VENOUS LEFT BASILIC UPPER COMPRESS: NORMAL
BH CV UPPER VENOUS LEFT BASILIC UPPER THROMBUS: NORMAL
BH CV UPPER VENOUS LEFT BRACHIAL COMPRESS: NORMAL
BH CV UPPER VENOUS LEFT CEPHALIC FOREARM COLOR: 1
BH CV UPPER VENOUS LEFT CEPHALIC FOREARM COMPRESS: NORMAL
BH CV UPPER VENOUS LEFT CEPHALIC FOREARM THROMBUS: NORMAL
BH CV UPPER VENOUS LEFT CEPHALIC UPPER COMPRESS: NORMAL
BH CV UPPER VENOUS LEFT RADIAL COMPRESS: NORMAL
BH CV UPPER VENOUS LEFT SUBCLAVIAN AUGMENT: NORMAL
BH CV UPPER VENOUS LEFT SUBCLAVIAN COMPRESS: NORMAL
BH CV UPPER VENOUS LEFT SUBCLAVIAN PHASIC: NORMAL
BH CV UPPER VENOUS LEFT SUBCLAVIAN SPONT: NORMAL
BH CV UPPER VENOUS LEFT ULNAR COMPRESS: NORMAL
BH CV UPPER VENOUS RIGHT AXILLARY AUGMENT: NORMAL
BH CV UPPER VENOUS RIGHT AXILLARY COMPRESS: NORMAL
BH CV UPPER VENOUS RIGHT AXILLARY PHASIC: NORMAL
BH CV UPPER VENOUS RIGHT AXILLARY SPONT: NORMAL
BH CV UPPER VENOUS RIGHT BASILIC FOREARM COLOR: 1
BH CV UPPER VENOUS RIGHT BASILIC FOREARM COMPRESS: NORMAL
BH CV UPPER VENOUS RIGHT BASILIC FOREARM THROMBUS: NORMAL
BH CV UPPER VENOUS RIGHT BASILIC UPPER COLOR: 1
BH CV UPPER VENOUS RIGHT BASILIC UPPER COMPRESS: NORMAL
BH CV UPPER VENOUS RIGHT BASILIC UPPER THROMBUS: NORMAL
BH CV UPPER VENOUS RIGHT BRACHIAL COMPRESS: NORMAL
BH CV UPPER VENOUS RIGHT CEPHALIC FOREARM COLOR: 1
BH CV UPPER VENOUS RIGHT CEPHALIC FOREARM COMPRESS: NORMAL
BH CV UPPER VENOUS RIGHT CEPHALIC FOREARM THROMBUS: NORMAL
BH CV UPPER VENOUS RIGHT INTERNAL JUGULAR AUGMENT: NORMAL
BH CV UPPER VENOUS RIGHT INTERNAL JUGULAR COMPRESS: NORMAL
BH CV UPPER VENOUS RIGHT INTERNAL JUGULAR PHASIC: NORMAL
BH CV UPPER VENOUS RIGHT INTERNAL JUGULAR SPONT: NORMAL
BH CV UPPER VENOUS RIGHT RADIAL COMPRESS: NORMAL
BH CV UPPER VENOUS RIGHT SUBCLAVIAN AUGMENT: NORMAL
BH CV UPPER VENOUS RIGHT SUBCLAVIAN COMPRESS: NORMAL
BH CV UPPER VENOUS RIGHT SUBCLAVIAN PHASIC: NORMAL
BH CV UPPER VENOUS RIGHT SUBCLAVIAN SPONT: NORMAL
BH CV UPPER VENOUS RIGHT ULNAR COMPRESS: NORMAL
BH CV VAS MEAS BASILIC FOREARM RIGHT - PROX: 0.11 CM
BH CV VAS MEAS BASILIC UPPER ARM LEFT - DIST: 0.3 CM
BH CV VAS MEAS BASILIC UPPER ARM LEFT - MID: 0.26 CM
BH CV VAS MEAS BASILIC UPPER ARM LEFT - PROX: 0.37 CM
BH CV VAS MEAS BASILIC UPPER ARM RIGHT - DIST: 0.46 CM
BH CV VAS MEAS BASILIC UPPER ARM RIGHT - MID: 0.41 CM
BH CV VAS MEAS BASILIC UPPER ARM RIGHT - PROX: 0.55 CM
BH CV VAS MEAS CEPHALIC ANTECUBITAL FOSSA LEFT: 0.35 CM
BH CV VAS MEAS CEPHALIC FOREARM RIGHT - PROX: 0.3 CM
BH CV VAS MEAS CEPHALIC UPPER ARM LEFT - DIST: 0.34 CM
BH CV VAS MEAS CEPHALIC UPPER ARM LEFT - MID: 0.37 CM
BH CV VAS MEAS CEPHALIC UPPER ARM LEFT - PROX: 0.46 CM
BH CV VAS MEAS CEPHALIC UPPER ARM RIGHT - MID: 0.11 CM
BH CV VAS MEAS CEPHALIC UPPER ARM RIGHT - PROX: 0.11 CM
BH CV VAS MEAS RADIAL UPPER ARM LEFT - DIST: 0.21 CM
BH CV VAS MEAS RADIAL UPPER ARM LEFT - MID: 0.21 CM
BH CV VAS MEAS RADIAL UPPER ARM LEFT - PROX: 0.32 CM
BH CV VAS MEAS RADIAL UPPER ARM RIGHT - DIST: 0.2 CM
BH CV VAS MEAS RADIAL UPPER ARM RIGHT - MID: 0.2 CM
BH CV VAS MEAS RADIAL UPPER ARM RIGHT - PROX: 0.23 CM
UPPER ARTERIAL LEFT ARM BRACHIAL LENGTH: 0.56 CM
UPPER ARTERIAL RIGHT ARM BRACHIAL LENGTH: 0.6 CM

## 2025-06-27 ENCOUNTER — ANESTHESIA (OUTPATIENT)
Dept: PERIOP | Facility: HOSPITAL | Age: 80
End: 2025-06-27
Payer: MEDICARE

## 2025-06-27 ENCOUNTER — ANESTHESIA EVENT (OUTPATIENT)
Dept: PERIOP | Facility: HOSPITAL | Age: 80
End: 2025-06-27
Payer: MEDICARE

## 2025-06-27 ENCOUNTER — HOSPITAL ENCOUNTER (OUTPATIENT)
Facility: HOSPITAL | Age: 80
Setting detail: HOSPITAL OUTPATIENT SURGERY
Discharge: NURSING, REHAB OR LONG-TERM CARE | End: 2025-06-27
Attending: STUDENT IN AN ORGANIZED HEALTH CARE EDUCATION/TRAINING PROGRAM | Admitting: STUDENT IN AN ORGANIZED HEALTH CARE EDUCATION/TRAINING PROGRAM
Payer: MEDICARE

## 2025-06-27 VITALS
DIASTOLIC BLOOD PRESSURE: 68 MMHG | SYSTOLIC BLOOD PRESSURE: 107 MMHG | OXYGEN SATURATION: 96 % | HEART RATE: 79 BPM | TEMPERATURE: 98.6 F | RESPIRATION RATE: 16 BRPM

## 2025-06-27 DIAGNOSIS — N18.6 ESRD (END STAGE RENAL DISEASE): ICD-10-CM

## 2025-06-27 LAB
ANION GAP SERPL CALCULATED.3IONS-SCNC: 11.1 MMOL/L (ref 5–15)
BASOPHILS # BLD AUTO: 0.03 10*3/MM3 (ref 0–0.2)
BASOPHILS NFR BLD AUTO: 0.7 % (ref 0–1.5)
BUN SERPL-MCNC: 25 MG/DL (ref 8–23)
BUN/CREAT SERPL: 7.2 (ref 7–25)
CALCIUM SPEC-SCNC: 8.5 MG/DL (ref 8.6–10.5)
CHLORIDE SERPL-SCNC: 99 MMOL/L (ref 98–107)
CO2 SERPL-SCNC: 26.9 MMOL/L (ref 22–29)
CREAT SERPL-MCNC: 3.49 MG/DL (ref 0.76–1.27)
DEPRECATED RDW RBC AUTO: 52 FL (ref 37–54)
EGFRCR SERPLBLD CKD-EPI 2021: 17 ML/MIN/1.73
EOSINOPHIL # BLD AUTO: 0.22 10*3/MM3 (ref 0–0.4)
EOSINOPHIL NFR BLD AUTO: 5.2 % (ref 0.3–6.2)
ERYTHROCYTE [DISTWIDTH] IN BLOOD BY AUTOMATED COUNT: 15.1 % (ref 12.3–15.4)
GLUCOSE BLDC GLUCOMTR-MCNC: 143 MG/DL (ref 70–130)
GLUCOSE BLDC GLUCOMTR-MCNC: 161 MG/DL (ref 70–130)
GLUCOSE SERPL-MCNC: 157 MG/DL (ref 65–99)
HCT VFR BLD AUTO: 32.3 % (ref 37.5–51)
HGB BLD-MCNC: 10 G/DL (ref 13–17.7)
IMM GRANULOCYTES # BLD AUTO: 0.05 10*3/MM3 (ref 0–0.05)
IMM GRANULOCYTES NFR BLD AUTO: 1.2 % (ref 0–0.5)
LYMPHOCYTES # BLD AUTO: 0.86 10*3/MM3 (ref 0.7–3.1)
LYMPHOCYTES NFR BLD AUTO: 20.4 % (ref 19.6–45.3)
MCH RBC QN AUTO: 29.6 PG (ref 26.6–33)
MCHC RBC AUTO-ENTMCNC: 31 G/DL (ref 31.5–35.7)
MCV RBC AUTO: 95.6 FL (ref 79–97)
MONOCYTES # BLD AUTO: 0.47 10*3/MM3 (ref 0.1–0.9)
MONOCYTES NFR BLD AUTO: 11.2 % (ref 5–12)
NEUTROPHILS NFR BLD AUTO: 2.58 10*3/MM3 (ref 1.7–7)
NEUTROPHILS NFR BLD AUTO: 61.3 % (ref 42.7–76)
NRBC BLD AUTO-RTO: 0 /100 WBC (ref 0–0.2)
PLATELET # BLD AUTO: 216 10*3/MM3 (ref 140–450)
PMV BLD AUTO: 10.1 FL (ref 6–12)
POTASSIUM SERPL-SCNC: 4 MMOL/L (ref 3.5–5.2)
RBC # BLD AUTO: 3.38 10*6/MM3 (ref 4.14–5.8)
SODIUM SERPL-SCNC: 137 MMOL/L (ref 136–145)
WBC NRBC COR # BLD AUTO: 4.21 10*3/MM3 (ref 3.4–10.8)

## 2025-06-27 PROCEDURE — 25010000002 MIDAZOLAM PER 1 MG: Performed by: ANESTHESIOLOGY

## 2025-06-27 PROCEDURE — 25010000002 CEFAZOLIN PER 500 MG: Performed by: STUDENT IN AN ORGANIZED HEALTH CARE EDUCATION/TRAINING PROGRAM

## 2025-06-27 PROCEDURE — 25010000002 ROPIVACAINE PER 1 MG: Performed by: ANESTHESIOLOGY

## 2025-06-27 PROCEDURE — 25010000002 LIDOCAINE 2% SOLUTION: Performed by: NURSE ANESTHETIST, CERTIFIED REGISTERED

## 2025-06-27 PROCEDURE — 25010000002 MEPIVACAINE HCL (PF) 1.5 % SOLUTION: Performed by: ANESTHESIOLOGY

## 2025-06-27 PROCEDURE — 36821 AV FUSION DIRECT ANY SITE: CPT | Performed by: STUDENT IN AN ORGANIZED HEALTH CARE EDUCATION/TRAINING PROGRAM

## 2025-06-27 PROCEDURE — 82948 REAGENT STRIP/BLOOD GLUCOSE: CPT

## 2025-06-27 PROCEDURE — 25010000002 FAMOTIDINE 10 MG/ML SOLUTION: Performed by: ANESTHESIOLOGY

## 2025-06-27 PROCEDURE — 25010000002 FENTANYL CITRATE (PF) 50 MCG/ML SOLUTION: Performed by: ANESTHESIOLOGY

## 2025-06-27 PROCEDURE — 80048 BASIC METABOLIC PNL TOTAL CA: CPT | Performed by: STUDENT IN AN ORGANIZED HEALTH CARE EDUCATION/TRAINING PROGRAM

## 2025-06-27 PROCEDURE — 25010000002 HEPARIN (PORCINE) PER 1000 UNITS: Performed by: STUDENT IN AN ORGANIZED HEALTH CARE EDUCATION/TRAINING PROGRAM

## 2025-06-27 PROCEDURE — 85025 COMPLETE CBC W/AUTO DIFF WBC: CPT | Performed by: STUDENT IN AN ORGANIZED HEALTH CARE EDUCATION/TRAINING PROGRAM

## 2025-06-27 PROCEDURE — 25010000002 PROPOFOL 200 MG/20ML EMULSION: Performed by: NURSE ANESTHETIST, CERTIFIED REGISTERED

## 2025-06-27 PROCEDURE — 25010000002 PHENYLEPHRINE 10 MG/ML SOLUTION 5 ML VIAL: Performed by: NURSE ANESTHETIST, CERTIFIED REGISTERED

## 2025-06-27 PROCEDURE — 25010000002 PROPOFOL 1000 MG/100ML EMULSION: Performed by: NURSE ANESTHETIST, CERTIFIED REGISTERED

## 2025-06-27 PROCEDURE — 25810000003 SODIUM CHLORIDE 0.9 % SOLUTION 250 ML FLEX CONT: Performed by: NURSE ANESTHETIST, CERTIFIED REGISTERED

## 2025-06-27 DEVICE — LIGACLIP MCA MULTIPLE CLIP APPLIERS, 20 SMALL CLIPS
Type: IMPLANTABLE DEVICE | Site: ARM | Status: FUNCTIONAL
Brand: LIGACLIP

## 2025-06-27 DEVICE — ABSORBABLE HEMOSTAT (OXIDIZED REGENERATED CELLULOSE, U.S.P.)
Type: IMPLANTABLE DEVICE | Site: ARM | Status: FUNCTIONAL
Brand: SURGICEL FIBRILLAR

## 2025-06-27 RX ORDER — FENTANYL CITRATE 50 UG/ML
25 INJECTION, SOLUTION INTRAMUSCULAR; INTRAVENOUS
Status: DISCONTINUED | OUTPATIENT
Start: 2025-06-27 | End: 2025-06-27 | Stop reason: HOSPADM

## 2025-06-27 RX ORDER — BUPIVACAINE HYDROCHLORIDE AND EPINEPHRINE 2.5; 5 MG/ML; UG/ML
INJECTION, SOLUTION EPIDURAL; INFILTRATION; INTRACAUDAL; PERINEURAL
Status: COMPLETED | OUTPATIENT
Start: 2025-06-27 | End: 2025-06-27

## 2025-06-27 RX ORDER — LABETALOL HYDROCHLORIDE 5 MG/ML
5 INJECTION, SOLUTION INTRAVENOUS
Status: DISCONTINUED | OUTPATIENT
Start: 2025-06-27 | End: 2025-06-27 | Stop reason: HOSPADM

## 2025-06-27 RX ORDER — BUPIVACAINE HCL/0.9 % NACL/PF 0.125 %
PLASTIC BAG, INJECTION (ML) EPIDURAL AS NEEDED
Status: DISCONTINUED | OUTPATIENT
Start: 2025-06-27 | End: 2025-06-27 | Stop reason: SURG

## 2025-06-27 RX ORDER — SODIUM CHLORIDE 9 MG/ML
9 INJECTION, SOLUTION INTRAVENOUS CONTINUOUS PRN
Status: DISCONTINUED | OUTPATIENT
Start: 2025-06-27 | End: 2025-06-27 | Stop reason: HOSPADM

## 2025-06-27 RX ORDER — MIDAZOLAM HYDROCHLORIDE 1 MG/ML
0.5 INJECTION, SOLUTION INTRAMUSCULAR; INTRAVENOUS
Status: DISCONTINUED | OUTPATIENT
Start: 2025-06-27 | End: 2025-06-27 | Stop reason: HOSPADM

## 2025-06-27 RX ORDER — HYDROMORPHONE HYDROCHLORIDE 1 MG/ML
0.25 INJECTION, SOLUTION INTRAMUSCULAR; INTRAVENOUS; SUBCUTANEOUS
Status: DISCONTINUED | OUTPATIENT
Start: 2025-06-27 | End: 2025-06-27 | Stop reason: HOSPADM

## 2025-06-27 RX ORDER — SODIUM CHLORIDE 0.9 % (FLUSH) 0.9 %
3 SYRINGE (ML) INJECTION EVERY 12 HOURS SCHEDULED
Status: DISCONTINUED | OUTPATIENT
Start: 2025-06-27 | End: 2025-06-27 | Stop reason: HOSPADM

## 2025-06-27 RX ORDER — KETAMINE HCL IN NACL, ISO-OSM 100MG/10ML
SYRINGE (ML) INJECTION AS NEEDED
Status: DISCONTINUED | OUTPATIENT
Start: 2025-06-27 | End: 2025-06-27 | Stop reason: SURG

## 2025-06-27 RX ORDER — PROMETHAZINE HYDROCHLORIDE 25 MG/1
25 SUPPOSITORY RECTAL ONCE AS NEEDED
Status: DISCONTINUED | OUTPATIENT
Start: 2025-06-27 | End: 2025-06-27 | Stop reason: HOSPADM

## 2025-06-27 RX ORDER — ATROPINE SULFATE 0.4 MG/ML
0.4 INJECTION, SOLUTION INTRAMUSCULAR; INTRAVENOUS; SUBCUTANEOUS ONCE AS NEEDED
Status: DISCONTINUED | OUTPATIENT
Start: 2025-06-27 | End: 2025-06-27 | Stop reason: HOSPADM

## 2025-06-27 RX ORDER — PROPOFOL 10 MG/ML
INJECTION, EMULSION INTRAVENOUS CONTINUOUS PRN
Status: DISCONTINUED | OUTPATIENT
Start: 2025-06-27 | End: 2025-06-27 | Stop reason: SURG

## 2025-06-27 RX ORDER — HYDROCODONE BITARTRATE AND ACETAMINOPHEN 7.5; 325 MG/1; MG/1
1 TABLET ORAL EVERY 4 HOURS PRN
Status: DISCONTINUED | OUTPATIENT
Start: 2025-06-27 | End: 2025-06-27 | Stop reason: HOSPADM

## 2025-06-27 RX ORDER — LIDOCAINE HYDROCHLORIDE 10 MG/ML
0.5 INJECTION, SOLUTION INFILTRATION; PERINEURAL ONCE AS NEEDED
Status: DISCONTINUED | OUTPATIENT
Start: 2025-06-27 | End: 2025-06-27 | Stop reason: HOSPADM

## 2025-06-27 RX ORDER — FLUMAZENIL 0.1 MG/ML
0.2 INJECTION INTRAVENOUS AS NEEDED
Status: DISCONTINUED | OUTPATIENT
Start: 2025-06-27 | End: 2025-06-27 | Stop reason: HOSPADM

## 2025-06-27 RX ORDER — EPHEDRINE SULFATE 50 MG/ML
5 INJECTION, SOLUTION INTRAVENOUS ONCE AS NEEDED
Status: DISCONTINUED | OUTPATIENT
Start: 2025-06-27 | End: 2025-06-27 | Stop reason: HOSPADM

## 2025-06-27 RX ORDER — HYDRALAZINE HYDROCHLORIDE 20 MG/ML
5 INJECTION INTRAMUSCULAR; INTRAVENOUS
Status: DISCONTINUED | OUTPATIENT
Start: 2025-06-27 | End: 2025-06-27 | Stop reason: HOSPADM

## 2025-06-27 RX ORDER — FENTANYL CITRATE 50 UG/ML
50 INJECTION, SOLUTION INTRAMUSCULAR; INTRAVENOUS ONCE AS NEEDED
Status: COMPLETED | OUTPATIENT
Start: 2025-06-27 | End: 2025-06-27

## 2025-06-27 RX ORDER — OXYCODONE HYDROCHLORIDE 5 MG/1
5 TABLET ORAL EVERY 8 HOURS PRN
Qty: 15 TABLET | Refills: 0 | Status: SHIPPED | OUTPATIENT
Start: 2025-06-27

## 2025-06-27 RX ORDER — IPRATROPIUM BROMIDE AND ALBUTEROL SULFATE 2.5; .5 MG/3ML; MG/3ML
3 SOLUTION RESPIRATORY (INHALATION) ONCE AS NEEDED
Status: DISCONTINUED | OUTPATIENT
Start: 2025-06-27 | End: 2025-06-27 | Stop reason: HOSPADM

## 2025-06-27 RX ORDER — FAMOTIDINE 10 MG/ML
20 INJECTION, SOLUTION INTRAVENOUS ONCE
Status: COMPLETED | OUTPATIENT
Start: 2025-06-27 | End: 2025-06-27

## 2025-06-27 RX ORDER — ROPIVACAINE HYDROCHLORIDE 5 MG/ML
INJECTION, SOLUTION EPIDURAL; INFILTRATION; PERINEURAL
Status: COMPLETED | OUTPATIENT
Start: 2025-06-27 | End: 2025-06-27

## 2025-06-27 RX ORDER — NALOXONE HCL 0.4 MG/ML
0.2 VIAL (ML) INJECTION AS NEEDED
Status: DISCONTINUED | OUTPATIENT
Start: 2025-06-27 | End: 2025-06-27 | Stop reason: HOSPADM

## 2025-06-27 RX ORDER — SODIUM CHLORIDE 9 MG/ML
INJECTION, SOLUTION INTRAVENOUS CONTINUOUS PRN
Status: DISCONTINUED | OUTPATIENT
Start: 2025-06-27 | End: 2025-06-27 | Stop reason: SURG

## 2025-06-27 RX ORDER — SODIUM CHLORIDE 0.9 % (FLUSH) 0.9 %
3-10 SYRINGE (ML) INJECTION AS NEEDED
Status: DISCONTINUED | OUTPATIENT
Start: 2025-06-27 | End: 2025-06-27 | Stop reason: HOSPADM

## 2025-06-27 RX ORDER — DROPERIDOL 2.5 MG/ML
0.62 INJECTION, SOLUTION INTRAMUSCULAR; INTRAVENOUS
Status: DISCONTINUED | OUTPATIENT
Start: 2025-06-27 | End: 2025-06-27 | Stop reason: HOSPADM

## 2025-06-27 RX ORDER — LIDOCAINE HYDROCHLORIDE 20 MG/ML
INJECTION, SOLUTION INFILTRATION; PERINEURAL AS NEEDED
Status: DISCONTINUED | OUTPATIENT
Start: 2025-06-27 | End: 2025-06-27 | Stop reason: SURG

## 2025-06-27 RX ORDER — OXYCODONE HYDROCHLORIDE 5 MG/1
5 TABLET ORAL EVERY 8 HOURS PRN
Qty: 15 TABLET | Refills: 0 | Status: SHIPPED | OUTPATIENT
Start: 2025-06-27 | End: 2025-06-27

## 2025-06-27 RX ORDER — ONDANSETRON 2 MG/ML
4 INJECTION INTRAMUSCULAR; INTRAVENOUS ONCE AS NEEDED
Status: DISCONTINUED | OUTPATIENT
Start: 2025-06-27 | End: 2025-06-27 | Stop reason: HOSPADM

## 2025-06-27 RX ORDER — HYDROCODONE BITARTRATE AND ACETAMINOPHEN 5; 325 MG/1; MG/1
1 TABLET ORAL ONCE AS NEEDED
Status: DISCONTINUED | OUTPATIENT
Start: 2025-06-27 | End: 2025-06-27 | Stop reason: HOSPADM

## 2025-06-27 RX ORDER — SODIUM CHLORIDE 0.9 % (FLUSH) 0.9 %
10 SYRINGE (ML) INJECTION AS NEEDED
Status: DISCONTINUED | OUTPATIENT
Start: 2025-06-27 | End: 2025-06-27 | Stop reason: HOSPADM

## 2025-06-27 RX ORDER — SODIUM CHLORIDE 0.9 % (FLUSH) 0.9 %
10 SYRINGE (ML) INJECTION EVERY 12 HOURS SCHEDULED
Status: DISCONTINUED | OUTPATIENT
Start: 2025-06-27 | End: 2025-06-27 | Stop reason: HOSPADM

## 2025-06-27 RX ORDER — DIPHENHYDRAMINE HYDROCHLORIDE 50 MG/ML
12.5 INJECTION, SOLUTION INTRAMUSCULAR; INTRAVENOUS
Status: DISCONTINUED | OUTPATIENT
Start: 2025-06-27 | End: 2025-06-27 | Stop reason: HOSPADM

## 2025-06-27 RX ORDER — PROMETHAZINE HYDROCHLORIDE 25 MG/1
25 TABLET ORAL ONCE AS NEEDED
Status: DISCONTINUED | OUTPATIENT
Start: 2025-06-27 | End: 2025-06-27 | Stop reason: HOSPADM

## 2025-06-27 RX ORDER — PROPOFOL 10 MG/ML
INJECTION, EMULSION INTRAVENOUS AS NEEDED
Status: DISCONTINUED | OUTPATIENT
Start: 2025-06-27 | End: 2025-06-27 | Stop reason: SURG

## 2025-06-27 RX ORDER — SODIUM CHLORIDE 9 MG/ML
40 INJECTION, SOLUTION INTRAVENOUS AS NEEDED
Status: DISCONTINUED | OUTPATIENT
Start: 2025-06-27 | End: 2025-06-27 | Stop reason: HOSPADM

## 2025-06-27 RX ADMIN — SODIUM CHLORIDE: 9 INJECTION, SOLUTION INTRAVENOUS at 12:45

## 2025-06-27 RX ADMIN — Medication 100 MCG: at 13:13

## 2025-06-27 RX ADMIN — Medication 200 MCG: at 13:17

## 2025-06-27 RX ADMIN — FENTANYL CITRATE 50 MCG: 50 INJECTION, SOLUTION INTRAMUSCULAR; INTRAVENOUS at 09:46

## 2025-06-27 RX ADMIN — PROPOFOL INJECTABLE EMULSION 20 MG: 10 INJECTION, EMULSION INTRAVENOUS at 12:52

## 2025-06-27 RX ADMIN — FAMOTIDINE 20 MG: 10 INJECTION INTRAVENOUS at 09:46

## 2025-06-27 RX ADMIN — PROPOFOL INJECTABLE EMULSION 10 MG: 10 INJECTION, EMULSION INTRAVENOUS at 12:56

## 2025-06-27 RX ADMIN — Medication 20 MG: at 13:26

## 2025-06-27 RX ADMIN — SODIUM CHLORIDE 2000 MG: 900 INJECTION INTRAVENOUS at 12:45

## 2025-06-27 RX ADMIN — BUPIVACAINE HYDROCHLORIDE AND EPINEPHRINE BITARTRATE 5 ML: 2.5; .005 INJECTION, SOLUTION EPIDURAL; INFILTRATION; INTRACAUDAL; PERINEURAL at 09:54

## 2025-06-27 RX ADMIN — MEPIVACAINE HYDROCHLORIDE 5 ML: 15 INJECTION, SOLUTION EPIDURAL; INFILTRATION at 09:54

## 2025-06-27 RX ADMIN — PROPOFOL 100 MCG/KG/MIN: 10 INJECTION, EMULSION INTRAVENOUS at 12:55

## 2025-06-27 RX ADMIN — Medication 100 MCG: at 13:23

## 2025-06-27 RX ADMIN — MIDAZOLAM 0.5 MG: 1 INJECTION INTRAMUSCULAR; INTRAVENOUS at 09:46

## 2025-06-27 RX ADMIN — PHENYLEPHRINE HYDROCHLORIDE 0.3 MCG/KG/MIN: 10 INJECTION, SOLUTION INTRAVENOUS at 13:03

## 2025-06-27 RX ADMIN — LIDOCAINE HYDROCHLORIDE 100 MG: 20 INJECTION, SOLUTION INFILTRATION; PERINEURAL at 12:52

## 2025-06-27 RX ADMIN — ROPIVACAINE HYDROCHLORIDE 15 ML: 5 INJECTION EPIDURAL; INFILTRATION; PERINEURAL at 09:54

## 2025-06-27 NOTE — OP NOTE
OPERATIVE NOTE  Patient Name: Rock Maciel Jr.  Patient MRN: [id number]  Patient : 1945    Service date: 2025    Surgeon: Jeaneth Bonds MD  Assistant: Obdulia Hardwick RN, Provided critical assistance in exposure, retraction, and suction that overall decrease blood loss and operative time.  Anesthesia: MAC with regional    Pre-operative diagnosis: ESRD on HD    Post-operative diagnosis: Same    Indication(s): This is a 79 y.o. male with a history of ESRD on HD. They need angioaccess for long term hemodialysis and vein mapping shows a good left upper arm cephalic vein for AV fistula creation.    Procedure/description: left upper arm brachiocephalic arteriovenous fistula    Findings: adequate vein and artery for arteriovenous fistula creation    Specimens: None    Estimated Blood Loss:  Minimal    Complications: none      Procedure in Detail:    After informed consent was obtained, the patient was brought to the operating room and placed under adequate MAC anesthesia.  A left arm block was performed by anesthesia in the pre operative area.    A timeout was performed with all OR staff present and in agreement, which confirmed the laterality and/or site of the procedure and the procedure to be performed prior to procedure start.  Appropriate perioperative antibiotics were administered prior to procedure start.      The left arm was prepped and draped in sterile fashion. The arm was then examined under US and the cephalic vein and brachial artery were of adequate size and good quality for arteriovenous fistula creation.  The brachial artery bifurcation was below the antecubital fossa.  Through a transverse antecubital incision, the cephalic vein was exposed proximally and distally down to its side branches. The side branches were ligated and the vein was ligated distally. It was then flushed with heparinized saline solution and reoccluded. It was of excellent size and quality. The bicipital aponeurosis  was opened longitudinally and the brachial artery was exposed proximally and distally and encircled with vessel loops proximally and distally.  The artery was controlled with vessel loops then a longitudinal arteriotomy was made on the brachial artery.  This was flushed proximally and distally with heparinized saline solution prior to reocclusion. The vein was then transposed and sewn end-to-side to the artery with a single running 6-0 Prolene suture. Everything was appropriately flushed then flow was restored. There was excellent thrill in the fistula and distal pulses were intact. Hemostasis was obtained. The wound was irrigated with NS then closed in layers with running 3-0 and 4-0 vicryl sutures. Skin glue was applied.  All counts were reported as correct at conclusion of the procedure.  Anesthesia was reversed and the patient was taken to the recovery room in satisfactory condition.  There were no apparent complications.     Signed:   Jeaneth Bonds MD  Vascular Surgery  O: [phone]  F: [phone]

## 2025-06-27 NOTE — DISCHARGE INSTRUCTIONS
[facility]  Azam Galvin, Miriam, Radha Flores,Fredis, Cammie  [address] [facility], [address]  [phone]    Post-Operative Instructions for AV Fistula / Graft   Diet: Regular Diet    Medications: Take your regularly scheduled medications on the day of your surgery, unless your doctor has directed you otherwise. You may be sent home with a prescription for pain medication, follow the directions as prescribed.    Activity Restrictions / Driving: Avoid lifting more than 15 pounds or other activities that stress or compress the access area. No driving for the remainder of the day after surgery. You may drive when you no longer are taking narcotic pain medications. If a nerve block was done to numb your arm for surgery, you will be placed in an arm sling.  This numbness and inability to move the arm can last for as little as 6 hours but as many as 18.  The sling should be used during this time but can be removed when sensation and movement of your arm is normal and does not need to be used after that. Use of the arm is encouraged after the surgery.    Incision Care: Some bruising is normal. If you have drainage from the incision please notify the office. Dressing should be removed in 48 hours. After dressing is removed, it is OK to shower. Do not submerge incision until cleared by your surgeon (bath or swimming).    Bathing and Showering: You may shower after you remove your dressing.    Follow-up Appointments: You will need to return to the office for a follow-up visit within 1-3 weeks after your surgery. Please make sure you have your appointment scheduled, call [phone].    The patient (you) should:  1. Avoid wearing tight constrictive clothing over that arm.  2. Avoid wearing jewelry that is tight, such as a watch on the access arm.  3. Avoid carrying heavy objects.  4. Avoid purse straps over the fistula.  5. Avoid sleeping on the arm or keeping it bent for extended periods of time.  6. Each  "day, using your opposite hand, feel over the fistula for the \"thrill\" or vibration that is normally present.    Fistula Information / Care:   It is normal to have swelling in the surgical area. To help control this swelling, you should elevate your arm on a pillow.   Wiggle your fingers and clinch your fist 10 times every hour, while awake, for the first 5-7 days. Also, bend and straighten at the elbow to regain normal range of motion. These exercises are designed to promote circulation in the fingers and aid in draining away the excess fluid accumulation in the immediate area.  No blood pressures or needle sticks in the arm with your access.    Call the office for the followin. Fever greater than 101.0  2. Uncontrolled pain. This is on a scale of 1-10 (10 being the worst pain imaginable) your pain is a level 7 or above.  3. It is important that you notify our office if you are having numbness and significant pain in the extremity in which you have just had surgery!  4. Decreased or absent thrill.  5. Nausea, diarrhea, and/or vomiting that continue for 12-24 hours.  6. Signs of an infection: redness, increased swelling, drainage, fever and/or chills.  7. Chest pain or difficulty breathing.    The fistula or graft CAN NOT be used until the MD has given written approval. Generally, a graft will be ready to use in 2-4  weeks, and a fistula will be ready to use in 6-12 weeks on average.     If you have further questions after reading this handout, the office is open from 8:30am to 5:00pm Monday through Friday. Call [phone].  "

## 2025-06-27 NOTE — ANESTHESIA POSTPROCEDURE EVALUATION
Patient: Rock Maciel Jr.    Procedure Summary       Date: 06/27/25 Room / Location: [facility] OR 11 / [facility] MAIN OR    Anesthesia Start: 1245 Anesthesia Stop: 1427    Procedure: LEFT ARTERIOVENOUS FISTULA FORMATION (Left) Diagnosis:       ESRD (end stage renal disease)      (ESRD (end stage renal disease) [N18.6])    Surgeons: Jeaneth Bonds MD Provider: Tin Badillo DO    Anesthesia Type: MAC, regional ASA Status: 4            Anesthesia Type: MAC, regional    Vitals  Vitals Value Taken Time   BP 96/47 06/27/25 15:00   Temp     Pulse 77 06/27/25 15:03   Resp     SpO2 95 % 06/27/25 15:03   Vitals shown include unfiled device data.        Post Anesthesia Care and Evaluation    Patient location during evaluation: bedside  Patient participation: complete - patient participated  Level of consciousness: awake and alert  Pain management: adequate    Airway patency: patent  Anesthetic complications: No anesthetic complications  PONV Status: controlled  Cardiovascular status: acceptable and hemodynamically stable  Respiratory status: acceptable  Hydration status: acceptable    Comments: /73   Pulse 78   Temp 37 °C (98.6 °F) (Oral)   Resp 16   SpO2 98%     POPC supplied by PNB with continued effect in expected distribution

## 2025-06-27 NOTE — ANESTHESIA PROCEDURE NOTES
Peripheral Block    Pre-sedation assessment completed: 6/27/2025 9:45 AM    Patient reassessed immediately prior to procedure    Patient location during procedure: holding area  Start time: 6/27/2025 9:48 AM  Stop time: 6/27/2025 9:54 AM  Reason for block: at surgeon's request and post-op pain management  Performed by  Anesthesiologist: Tin Badilol DO  Preanesthetic Checklist  Completed: patient identified, IV checked, site marked, risks and benefits discussed, surgical consent, monitors and equipment checked, pre-op evaluation and timeout performed  Prep:  Pt Position: sitting  Sterile barriers:gloves, mask, cap and washed/disinfected hands  Prep: ChloraPrep  Patient monitoring: blood pressure monitoring, continuous pulse oximetry and EKG  Procedure    Sedation: yes  Performed under: local infiltration  Guidance:ultrasound guided    ULTRASOUND INTERPRETATION.  Using ultrasound guidance a gauge needle was placed in close proximity to the brachial plexus nerve, at which point, under ultrasound guidance anesthetic was injected in the area of the nerve and spread of the anesthesia was seen on ultrasound in close proximity thereto.  There were no abnormalities seen on ultrasound; a digital image was taken; and the patient tolerated the procedure with no complications. Images:still images obtained, printed/placed on chart    Laterality:left  Block Type:supraclavicular  Injection Technique:single-shot  Needle Type:echogenic  Needle Gauge:22 G  Resistance on Injection: none    Medications Used: Mepivacaine HCl (PF) (CARBOCAINE) 1.5 % injection - Peripheral Nerve   5 mL - 6/27/2025 9:54:00 AM  ropivacaine (NAROPIN) 0.5 % injection - Peripheral Nerve   15 mL - 6/27/2025 9:54:00 AM  bupivacaine-EPINEPHrine PF (MARCAINE w/EPI) 0.25% -[id number] injection - Injection   5 mL - 6/27/2025 9:54:00 AM      Medications  Comment:Ultrasound Interpretation:  Using ultrasound guidance the needle was placed in close proximity  to the brachial plexus and anesthetic was injected in the area of the nerve and spread of the anesthetic was seen on ultrasound in close proximity thereto.  There were no abnormalities seen on ultrasound; a digital image was taken; and the patient tolerated the procedure with no complications.         Post Assessment  Injection Assessment: negative aspiration for heme, no paresthesia on injection and incremental injection  Patient Tolerance:comfortable throughout block  Complications:no  Additional Notes  Ultrasound guidance was used to both view and verify local anesthetic placement and disbursement. In addition, it was used to evaluate the respective anatomy to determine needle approach and placement.  Performed by: Tin Badillo DO

## 2025-06-27 NOTE — ANESTHESIA PREPROCEDURE EVALUATION
Anesthesia Evaluation     Patient summary reviewed   no history of anesthetic complications:   NPO Solid Status: > 6 hours  NPO Liquid Status: > 2 hours           Airway   Mallampati: III  TM distance: >3 FB  Neck ROM: full  No difficulty expected  Dental - normal exam     Pulmonary    (+) ,sleep apnea  (-) pneumonia, shortness of breath, recent URI, not a smoker  Cardiovascular     PT is on anticoagulation therapy  Patient on routine beta blocker and Beta blocker given within 24 hours of surgery  Rate: normal    (+) hypertension, valvular problems/murmurs (s/p tissue AVR), CAD, dysrhythmias (LAFB RBBB) Atrial Fib, CHF Diastolic >=55%, hyperlipidemia,  carotid artery disease carotid bilateral  (-) past MI, angina    ROS comment: LAFB and RBBB on EKG    Echo 5/2025 nl LV/RV fxn, mild-mod MR, bioprosthetic AVR noted  RVSP >55mmHg    Neuro/Psych  (-) seizures, CVA  GI/Hepatic/Renal/Endo    (+) obesity, hiatal hernia, renal disease (TTS HD,  Cr 3.49)- ESRD and dialysis, diabetes mellitus type 2  (-) GERD, liver disease, no thyroid disorder    Musculoskeletal     Abdominal    Substance History      OB/GYN          Other                          Anesthesia Plan    ASA 4     MAC and regional     (+SCB USGSS     MAC anesthesia discussed with patient and/or patient representative. Risks (including but not limited to intra-op awareness), benefits, and alternatives were discussed. Understanding was voiced with an agreement to proceed with a MAC technique and General as a backup option. )    Anesthetic plan, risks, benefits, and alternatives have been provided, discussed and informed consent has been obtained with: patient.        CODE STATUS:

## 2025-07-23 ENCOUNTER — OFFICE VISIT (OUTPATIENT)
Age: 80
End: 2025-07-23
Payer: MEDICARE

## 2025-07-23 VITALS
DIASTOLIC BLOOD PRESSURE: 68 MMHG | HEART RATE: 70 BPM | SYSTOLIC BLOOD PRESSURE: 121 MMHG | TEMPERATURE: 97.6 F | HEIGHT: 72 IN | BODY MASS INDEX: 34.04 KG/M2

## 2025-07-23 DIAGNOSIS — N18.6 ESRD (END STAGE RENAL DISEASE): Primary | ICD-10-CM

## 2025-07-23 RX ORDER — HYDROXYZINE HYDROCHLORIDE 25 MG/1
25 TABLET, FILM COATED ORAL EVERY 6 HOURS PRN
COMMUNITY
Start: 2025-07-15

## 2025-07-23 NOTE — PROGRESS NOTES
Chief Complaint  ESRD (end stage renal disease)    Subjective        Rock Maciel Jr. presents to [facility] VASCULAR SURGERY  History of Present Illness  79 y.o. male returns for post op follow up s/p left brachiocephalic AV fistula creation on 6/27/25.  He denies any numbness, tingling, arm swelling, weakness, or rest pain.  He denies fevers, chills, and drainage from the incision.     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  Added automatically from request for surgery [id number]      Gout     H/O aortic valve replacement with porcine valve 09/10/2018    Hiatal hernia     History of MRSA infection     RIGHT FOOT 2010    Hyperlipidemia     Hypertension     LAFB (left anterior fascicular block) 05/17/2016    MRSA (methicillin resistant Staphylococcus aureus) infection 03/08/2025    Murmur     Neuropathic arthropathy 05/17/2016    Overview:   2015 IMO UPDATE  Overview:   2015 IMO UPDATE      Nocturnal hypoxemia 12/01/2024    Nonrheumatic aortic valve stenosis 01/30/2017    Persistent atrial  fibrillation 11/07/2024    Pneumothorax on right     S/P CABG x 2 05/06/2019 July 2018      Sleep apnea     pt wears CPAP at night    Status post left inguinal hernia repair, follow-up exam 01/05/2025    Traumatic rhabdomyolysis 03/03/2025    Type 2 diabetes mellitus with circulatory disorder, without long-term current use of insulin 05/17/2016    Unsteady gait when walking 01/05/2025    Wellness examination 06/24/2015    Annual Wellness Visit       Past Surgical History:   Procedure Laterality Date    ARTERIOVENOUS FISTULA/SHUNT SURGERY Left 6/27/2025    Procedure: LEFT ARTERIOVENOUS FISTULA FORMATION;  Surgeon: Jeaneth Bonds MD;  Location: [facility] OR;  Service: Vascular;  Laterality: Left;    ARTERY SURGERY Bilateral     carotid    BONE EXCISION LEG Right 3/10/2025    Procedure: BONE EXCISION LOWER EXTERMITY, RIGHT;  Surgeon: Jimenez Mchugh DPM;  Location: [facility] MAIN OR;  Service: Podiatry;  Laterality: Right;    CARDIAC CATHETERIZATION N/A 7/20/2018    Procedure: Left Heart Cath;  Surgeon: Jo Toney MD;  Location: [facility] CATH INVASIVE LOCATION;  Service: Cardiovascular    CARDIAC CATHETERIZATION N/A 7/20/2018    Procedure: Coronary angiography;  Surgeon: Jo Toney MD;  Location: [facility] CATH [facility]ASIVE LOC[facility];  Service: Cardiovascular    CAROTID ENDARTERECTOMY Bilateral     COLONOSCOPY  2008    CORONARY ARTERY BYPASS GRAFT WITH AORTIC VALVE REPAIR/REPLACEMENT N/A 7/23/2018    Procedure: INTRAOPERATIVE ALEX, MIDLINE STERNOTOMY, CORONARY ARTERY BYPASS GRAFTING X 3 USING ENDOSCOPICALLY HARVESTED LEFT GREATER SAPHENOUS VEIN,  AORTIC VALVE REPLACEMENT USING 25MM LOPEZ II ULTRA PORCINE VALVE, PRP;  Surgeon: Phong Posey MD;  Location: [facility] OR;  Service: Cardiothoracic    CYSTOSCOPY WITH CLOT EVACUATION N/A 5/24/2025    Procedure: CYSTOSCOPY WITH CLOT EVACUATION, RESECTION OF BLADDER MASS AND PLACEMENT OF CONTINOUS BLADDER IRRIGATION CATHETER;  Surgeon: Clemente  Prosper HARPER MD;  Location: [facility] MAIN OR;  Service: Urology;  Laterality: N/A;    ENDOSCOPY N/A 5/25/2025    Procedure: ESOPHAGOGASTRODUODENOSCOPY with biopsy;  Surgeon: Darrian Webb MD;  Location: [facility] ENDOSCOPY;  Service: Gastroenterology;  Laterality: N/A;  congested gastropathy    FOOT SURGERY Right 2010    5th digit removal    FOOT SURGERY Left 2011    1 digit removed    INCISION AND DRAINAGE LEG Right 3/28/2020    Procedure: DEBRIDMENT OF RIGHT CALF;  Surgeon: Ross Pineda MD;  Location: [facility] MAIN OR;  Service: Vascular;  Laterality: Right;    INCISION AND DRAINAGE LEG Right 3/10/2025    Procedure: INCISION AND DRAINAGE LOWER EXTREMITY;  Surgeon: Jimenez Mchugh DPM;  Location: [facility] MAIN OR;  Service: Podiatry;  Laterality: Right;    INGUINAL HERNIA REPAIR Left 11/29/2024    Procedure: INGUINAL HERNIA REPAIR LAPAROSCOPIC WITH DAVINCI ROBOT;  Surgeon: Phong Lazo MD;  Location: [facility] MAIN OR;  Service: Robotics - DaVinci;  Laterality: Left;    INSERTION HEMODIALYSIS CATHETER N/A 3/11/2025    Procedure: HEMODIALYSIS CATHETER INSERTION;  Surgeon: Jeaneth Bonds MD;  Location: [facility] MAIN OR;  Service: Vascular;  Laterality: N/A;    QUADRICEPS TENDON REPAIR Left 5/14/2019    Procedure: Left QUADRICEPS TENDON REPAIR;  Surgeon: Camilo Hunter MD;  Location: [facility] OR;  Service: Orthopedics    THORACENTESIS Right 11/21/2016    THORACOSCOPY Right 5/8/2017    Procedure: BRONCHOSCOPY, RIGHT VAT,  TOTAL DECORTICATION RIGHT LUNG, PLEURAL BX, PLACEMENT SUBPLEURAL PAIN CAATHETERS X2;  Surgeon: Donald Orlando III, MD;  Location: [facility] OR;  Service:     TONSILECTOMY, ADENOIDECTOMY, BILATERAL MYRINGOTOMY AND TUBES      WOUND DEBRIDEMENT Right 3/13/2025    Procedure: DEBRIDEMENT FOOT, RIGHT;  Surgeon: Jimenez Mchugh DPM;  Location: [facility] MAIN OR;  Service: Podiatry;  Laterality: Right;       Family History   Problem Relation Age of Onset    Hypertension Father      Malig Hyperthermia Neg Hx          Social History     Tobacco Use    Smoking status: Never     Passive exposure: Never    Smokeless tobacco: Never   Vaping Use    Vaping status: Never Used   Substance Use Topics    Alcohol use: Not Currently     Alcohol/week: 7.0 standard drinks of alcohol     Types: 7 Drinks containing 0.5 oz of alcohol per week    Drug use: Never       Allergies: Ceclor [cefaclor]    Prior to Admission medications    Medication Sig Start Date End Date Taking? Authorizing Provider   acetaminophen (TYLENOL) 325 MG tablet Take 2 tablets by mouth Every 6 (Six) Hours As Needed for Mild Pain. 11/30/24  Yes Bishop Chakraborty MD   amiodarone (PACERONE) 200 MG tablet Take 1 tablet by mouth Daily. 1/9/25  Yes Jo Toney MD   apixaban (ELIQUIS) 2.5 MG tablet tablet Take 1 tablet by mouth 2 (Two) Times a Day. Indications: Atrial Fibrillation 11/15/24  Yes Apurva Smith APRN   atorvastatin (LIPITOR) 20 MG tablet Take 1 tablet by mouth Every Night. NEED TO SEE PROVIDER FOR FUTURE REFILLS. 2/7/25  Yes Denise Dickson APRN   bumetanide (BUMEX) 2 MG tablet Take 1 tablet by mouth 3 (Three) Times a Day. 3/19/25  Yes Magdalena Diamond MD   cetaphil (CETAPHIL) lotion Apply  topically to the appropriate area as directed Every 12 (Twelve) Hours. 6/3/25  Yes Ru Markham MD   Cholecalciferol 25 MCG (1000 UT) tablet Take 1 tablet by mouth Every 7 (Seven) Days.   Yes Shivam Smith MD   clobetasol propionate (TEMOVATE) 0.05 % cream Apply 1 Application topically to the appropriate area as directed Every 12 (Twelve) Hours. 6/3/25  Yes Ru Markham MD   clopidogrel (PLAVIX) 75 MG tablet Take 1 tablet by mouth Daily. 10/16/24  Yes Denise Dickson APRN   Continuous Glucose Sensor (FreeStyle Delia 3 Sensor) misc Use 1 each Continuous. Use to monitor blood glucose levels continuously.  Change sensors every 14 days 6/3/25  Yes Ru Markham MD   epoetin vince-epbx (RETACRIT)  27021 UNIT/ML injection Inject 1 mL under the skin into the appropriate area as directed 3 (Three) Times a Week. Indications: ESRD on Dialysis 6/4/25  Yes Ru Markham MD   heparin 100 UNIT/ML solution injection Infuse 3 mL into a venous catheter Daily As Needed for Line Care (Flush Each Lumen Daily When Not in Continuous Use - [place] Do NOT Require Heparin). 6/3/25  Yes Ru Markham MD   Heparin Sodium, Porcine, (heparin, porcine,) 1000 UNIT/ML injection 4 mL by Intracatheter route As Needed (HD catheter lock). Indications: HD catheter 6/3/25  Yes Ru Markham MD   hydrALAZINE (APRESOLINE) 10 MG tablet Take 1 tablet by mouth 3 (Three) Times a Day. 3/19/25  Yes Magdalena Diamond MD   hydrOXYzine (ATARAX) 25 MG tablet Take 1 tablet by mouth Every 6 (Six) Hours As Needed. 7/15/25  Yes Shivam Smith MD   insulin lispro (HUMALOG/ADMELOG) 100 UNIT/ML injection Inject 2-9 Units under the skin into the appropriate area as directed 4 (Four) Times a Day Before Meals & at Bedtime. 3/19/25  Yes Magdalena Diamond MD   Lidocaine HCl gel (XYLOCAINE) 2 % gel Apply  topically to the appropriate area as directed As Needed for Mild Pain. 6/3/25  Yes Ru Markham MD   linagliptin (Tradjenta) 5 MG tablet tablet Take 1 tablet by mouth Daily. 1/9/25  Yes Denise Dickson APRN   melatonin 5 MG tablet tablet Take 1 tablet by mouth Every Night. 3/19/25  Yes Magdalena Diamond MD   Menthol-Zinc Oxide 0.44-20.6 % ointment Apply 1 Application topically to the appropriate area as directed 2 (Two) Times a Day. 6/3/25  Yes Ru Markham MD   metoprolol succinate XL (TOPROL-XL) 25 MG 24 hr tablet Take 1 tablet by mouth Daily. 11/15/24  Yes Apurva Smith APRN   miconazole (MICOTIN) 2 % powder Apply 1 Application topically to the appropriate area as directed Every 12 (Twelve) Hours. 6/3/25  Yes Ru Markham MD   nitroglycerin (NITROSTAT) 0.4 MG SL tablet Place 1 tablet under the tongue Every 5 (Five)  "Minutes As Needed for Chest Pain (Only if SBP Greater Than 100). Take no more than 3 doses in 15 minutes. 11/15/24  Yes Apurva Smith APRN   ondansetron ODT (ZOFRAN-ODT) 4 MG disintegrating tablet Take 1 tablet by mouth Every 6 (Six) Hours As Needed for Nausea or Vomiting. 3/19/25  Yes Magdalena Diamond MD   oxybutynin (DITROPAN) 5 MG tablet Take 1 tablet by mouth 3 (Three) Times a Day As Needed (bladder spasms). 6/3/25  Yes Ru Markham MD   oxyCODONE (ROXICODONE) 5 MG immediate release tablet Take 1 tablet by mouth Every 8 (Eight) Hours As Needed for Severe Pain. 6/27/25  Yes Jeaneth Bonds MD   pantoprazole (PROTONIX) 40 MG EC tablet Take 1 tablet by mouth Every Morning. 6/4/25  Yes Ru Markham MD   polyethylene glycol (MIRALAX) 17 g packet Take 17 g by mouth Daily.   Yes Shivam Smith MD   senna 8.6 MG tablet Take 1 tablet by mouth Daily.   Yes Shivam Smith MD   sodium chloride 0.65 % nasal spray Administer 1 spray into the nostril(s) as directed by provider Every 30 (Thirty) Minutes As Needed for Congestion. 6/3/25  Yes Ru Markham MD   sodium hypochlorite (DAKIN'S 1/4 STRENGTH) 0.125 % solution topical solution 0.125% Apply 10 mL topically to the appropriate area as directed 2 (Two) Times a Day. 6/3/25  Yes Ru Markham MD   tamsulosin (FLOMAX) 0.4 MG capsule 24 hr capsule Take 1 capsule by mouth Daily. 6/4/25  Yes Ru Markham MD   vitamin C (ASCORBIC ACID) 250 MG tablet Take 1 tablet by mouth Daily.   Yes Shivam Smith MD   Zinc 50 MG tablet  2/19/22  Yes Shivam Smith MD         Objective   Vital Signs:  /68   Pulse 70   Temp 97.6 °F (36.4 °C)   Ht 182.9 cm (72\")   BMI 34.04 kg/m²   Estimated body mass index is 34.04 kg/m² as calculated from the following:    Height as of this encounter: 182.9 cm (72\").    Weight as of 6/18/25: 114 kg (251 lb).               Physical Exam  Vitals reviewed.   Constitutional:       General: He is not " in acute distress.     Appearance: Normal appearance. He is obese.   HENT:      Head: Normocephalic and atraumatic.      Right Ear: External ear normal.      Left Ear: External ear normal.      Nose: Nose normal.      Mouth/Throat:      Mouth: Mucous membranes are moist.      Pharynx: Oropharynx is clear.   Eyes:      Extraocular Movements: Extraocular movements intact.      Conjunctiva/sclera: Conjunctivae normal.      Pupils: Pupils are equal, round, and reactive to light.   Cardiovascular:      Rate and Rhythm: Normal rate and regular rhythm.      Pulses:           Carotid pulses are 2+ on the right side and 2+ on the left side.       Radial pulses are 2+ on the right side and 2+ on the left side.      Comments: Palpable thrill over left arm AVF  Pulmonary:      Comments: Non labored respirations on room air, equal chest rise  Abdominal:      General: Abdomen is flat. There is no distension.      Palpations: Abdomen is soft.   Musculoskeletal:      Cervical back: Normal range of motion. No rigidity.      Right lower leg: No edema.      Left lower leg: No edema.   Skin:     General: Skin is warm and dry.      Capillary Refill: Capillary refill takes less than 2 seconds.   Neurological:      General: No focal deficit present.      Mental Status: He is alert and oriented to person, place, and time.   Psychiatric:         Mood and Affect: Mood normal.         Behavior: Behavior normal.        Result Review :    The following data was reviewed by: Jeaneth Bonds MD on 07/23/2025:  Common labs          6/2/2025    06:31 6/3/2025    04:48 6/27/2025    09:01 6/27/2025    09:35   Common Labs   Glucose 136  120  157     BUN 32.0  39.0  25.0     Creatinine 3.26  3.96  3.49     Sodium 135  137  137     Potassium 4.3  4.8  4.0     Chloride 102  103  99     Calcium 8.8  8.7  8.5     Albumin 2.6  2.7      Total Bilirubin 0.3  0.3      Alkaline Phosphatase 88  93      AST (SGOT) 14  16      ALT (SGPT) 15  14      WBC 5.67   5.72   4.21    Hemoglobin 9.7  10.0   10.0    Hematocrit 31.1  31.9   32.3    Platelets 261  268   216      CMP          6/2/2025    06:31 6/3/2025    04:48 6/27/2025    09:01   CMP   Glucose 136  120  157    BUN 32.0  39.0  25.0    Creatinine 3.26  3.96  3.49    EGFR 18.4  14.6  17.0    Sodium 135  137  137    Potassium 4.3  4.8  4.0    Chloride 102  103  99    Calcium 8.8  8.7  8.5    Total Protein 6.5  6.7     Albumin 2.6  2.7     Total Bilirubin 0.3  0.3     Alkaline Phosphatase 88  93     AST (SGOT) 14  16     ALT (SGPT) 15  14     BUN/Creatinine Ratio 9.8  9.8  7.2    Anion Gap 7.1  9.8  11.1      CBC          6/2/2025    06:31 6/3/2025    04:48 6/27/2025    09:35   CBC   WBC 5.67  5.72  4.21    RBC 3.24  3.26  3.38    Hemoglobin 9.7  10.0  10.0    Hematocrit 31.1  31.9  32.3    MCV 96.0  97.9  95.6    MCH 29.9  30.7  29.6    MCHC 31.2  31.3  31.0    RDW 15.3  15.4  15.1    Platelets 261  268  216      CBC w/diff          6/2/2025    06:31 6/3/2025    04:48 6/27/2025    09:35   CBC w/Diff   WBC 5.67  5.72  4.21    RBC 3.24  3.26  3.38    Hemoglobin 9.7  10.0  10.0    Hematocrit 31.1  31.9  32.3    MCV 96.0  97.9  95.6    MCH 29.9  30.7  29.6    MCHC 31.2  31.3  31.0    RDW 15.3  15.4  15.1    Platelets 261  268  216    Neutrophil Rel %   61.3    Immature Granulocyte Rel %   1.2    Lymphocyte Rel %   20.4    Monocyte Rel %   11.2    Eosinophil Rel %   5.2    Basophil Rel %   0.7      Lipid Panel          11/8/2024    05:14   Lipid Panel   Total Cholesterol 81    Triglycerides 52    HDL Cholesterol 34    VLDL Cholesterol 13    LDL Cholesterol  34    LDL/HDL Ratio 1.08      TSH          5/22/2025    05:35   TSH   TSH 4.480      Electrolytes          6/2/2025    06:31 6/3/2025    04:48 6/27/2025    09:01   Electrolytes   Sodium 135  137  137    Potassium 4.3  4.8  4.0    Chloride 102  103  99    Calcium 8.8  8.7  8.5      Renal Profile          6/2/2025    06:31 6/3/2025    04:48 6/27/2025    09:01   Renal Profile    BUN 32.0  39.0  25.0    Creatinine 3.26  3.96  3.49      BMP          6/2/2025    06:31 6/3/2025    04:48 6/27/2025    09:01   BMP   BUN 32.0  39.0  25.0    Creatinine 3.26  3.96  3.49    Sodium 135  137  137    Potassium 4.3  4.8  4.0    Chloride 102  103  99    CO2 25.9  24.2  26.9    Calcium 8.8  8.7  8.5          Last Echo  Results for orders placed during the hospital encounter of 05/21/25    Adult Transthoracic Echo Complete W/ Cont if Necessary Per Protocol 05/23/2025 11:39 AM    Interpretation Summary    Left ventricular ejection fraction appears to be 56 - 60%.    Left ventricular diastolic function was normal.    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 Mild transvalvular regurgitation is present in the prosthetic aortic valve.    Estimated right ventricular systolic pressure from tricuspid regurgitation is markedly elevated (>55 mmHg).      Results for orders placed during the hospital encounter of 10/03/24    Adult Transthoracic Echo Complete W/ Cont if Necessary Per Protocol 10/04/2024 11:23 AM    Interpretation Summary    Left ventricular ejection fraction appears to be 61 - 65%.    Left ventricular diastolic function was indeterminate.    The left atrial cavity is moderately dilated.    Left atrial volume is moderately increased.    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    Estimated right ventricular systolic pressure from tricuspid regurgitation is moderately elevated (45-55 mmHg).       Last Stress  Results for orders placed in visit on 10/30/23    Stress Test With Myocardial Perfusion One Day 11/09/2023  3:19 PM    Interpretation Summary    Findings consistent with an equivocal ECG stress test.    Left ventricular ejection fraction is normal (Calculated EF = 50%).    Myocardial perfusion imaging indicates a small-sized infarct located in the lateral wall with no significant ischemia noted.    Impressions are consistent with an intermediate risk study.    Asymptomatic for chest  pain. Specificity of study reduced secondary to baseline BBBB, however ECG is not suggestive of ischemia. ECG is  equivocal for  suggestion of ischemia  Ectopy: rare PVC at baseline,  Blood pressure response:  appropriate. Baseline Hypertensive 170/70, Peak (post Lexiscan)170/70, Recovery: 142//65  Pharmacologic study due to inability to tolerate increasing speed and grade of treadmill due to orthopedic, mobility  issues and Beta-blocker therapy.  Unable to participate in low level exercise due to  poor mobility, poor weight bearing and fall risk    Supervised by:  Apurva PASTRANA.      Results for orders placed in visit on 09/10/18    Treadmill Stress Test 09/22/2018 12:40 PM    Interpretation Summary  · Equivocal ECG evidence of myocardial ischemia.Negative clinical evidence of myocardial ischemia. Findings consistent with an equivocal ECG stress test. Submaximal Stress Test.  · . Asymptomatic for chest pain    Submaximal Stress Test. Asymptomatic for chest pain for heart rate achieved. Specificity of study reduced secondary to motion artifact and inability to achieve target heart rate.  Ectopy: none.  Blood pressure response:  appropriate. Exercise tolerance poor.       Last Cath  No results found for this or any previous visit.                Assessment and Plan     Diagnoses and all orders for this visit:    1. ESRD (end stage renal disease) (Primary)  -     Duplex Hemodialysis Access CAR; Future    Rock Maciel returns for 1 month follow up s/p left arm AVF creation.  His incision is well healed and he does not have any symptoms of steal.  There is an excellent thrill in the fistula and there are palpable pulses distal to this.  I have recommended he follow up in 1 month with a duplex to determine if the fistula has matured.         Follow Up     Return in about 1 month (around 8/23/2025).  Patient was given instructions and counseling regarding his condition or for health maintenance advice.  Please see specific information pulled into the AVS if appropriate.   Any information in this note that has been copied and pasted was reviewed and edited to reflect today's exam.  This note was created using Dragon dictation software.  I have reviewed the note for accuracy and made corrections as needed.  Please note that some errors may still exist, please contact me with any questions or concerns.

## 2025-08-20 ENCOUNTER — HOSPITAL ENCOUNTER (OUTPATIENT)
Facility: HOSPITAL | Age: 80
Discharge: HOME | End: 2025-08-20
Admitting: STUDENT IN AN ORGANIZED HEALTH CARE EDUCATION/TRAINING PROGRAM
Payer: MEDICARE

## 2025-08-20 ENCOUNTER — OFFICE VISIT (OUTPATIENT)
Age: 80
End: 2025-08-20
Payer: MEDICARE

## 2025-08-20 VITALS
WEIGHT: 233.3 LBS | BODY MASS INDEX: 32.66 KG/M2 | SYSTOLIC BLOOD PRESSURE: 130 MMHG | HEIGHT: 71 IN | DIASTOLIC BLOOD PRESSURE: 80 MMHG | HEART RATE: 76 BPM

## 2025-08-20 DIAGNOSIS — N18.6 ESRD (END STAGE RENAL DISEASE): ICD-10-CM

## 2025-08-20 DIAGNOSIS — N18.6 ESRD (END STAGE RENAL DISEASE): Primary | ICD-10-CM

## 2025-08-20 DIAGNOSIS — T82.858A AV FISTULA STENOSIS, INITIAL ENCOUNTER: ICD-10-CM

## 2025-08-20 LAB
BH CV VAS DIALYSIS ARTERIAL ANASTOMOSIS DIAMETER: 0.4 CM
BH CV VAS DIALYSIS ARTERIAL ANASTOMOSIS EDV: 119 CM/SEC
BH CV VAS DIALYSIS ARTERIAL ANASTOMOSIS PSV: 299 CM/SEC
BH CV VAS DIALYSIS CONDUIT DIST DEPTH: 0.7 CM
BH CV VAS DIALYSIS CONDUIT DIST DIAMETER: 0.7 CM
BH CV VAS DIALYSIS CONDUIT DIST EDV: 66.8 CM/SEC
BH CV VAS DIALYSIS CONDUIT DIST FLOW VOL: 598 ML/MIN
BH CV VAS DIALYSIS CONDUIT DIST PSV: 119 CM/SEC
BH CV VAS DIALYSIS CONDUIT MID DEPTH: 0.4 CM
BH CV VAS DIALYSIS CONDUIT MID DIAMETER: 0.7 CM
BH CV VAS DIALYSIS CONDUIT MID EDV: 58.9 CM/SEC
BH CV VAS DIALYSIS CONDUIT MID FLOW VOL: 701 ML/MIN
BH CV VAS DIALYSIS CONDUIT MID PSV: 151 CM/SEC
BH CV VAS DIALYSIS CONDUIT MID/DIST DEPTH: 0.5 CM
BH CV VAS DIALYSIS CONDUIT MID/DIST DIAMETER: 0.6 CM
BH CV VAS DIALYSIS CONDUIT MID/DIST EDV: 62.1 CM/SEC
BH CV VAS DIALYSIS CONDUIT MID/DIST FLOW VOL: 784 ML/MIN
BH CV VAS DIALYSIS CONDUIT MID/DIST PSV: 110 CM/SEC
BH CV VAS DIALYSIS CONDUIT PROX DEPTH: 0.4 CM
BH CV VAS DIALYSIS CONDUIT PROX DIAMETER: 1 CM
BH CV VAS DIALYSIS CONDUIT PROX EDV: 58.1 CM/SEC
BH CV VAS DIALYSIS CONDUIT PROX PSV: 176 CM/SEC
BH CV VAS DIALYSIS CONDUIT PROX/MID DEPTH: 0.5 CM
BH CV VAS DIALYSIS CONDUIT PROX/MID DIAMETER: 0.4 CM
BH CV VAS DIALYSIS CONDUIT PROX/MID EDV: 379 CM/SEC
BH CV VAS DIALYSIS CONDUIT PROX/MID PSV: 786 CM/SEC
BH CV VAS DIALYSIS LEFT BRANCH 1 DIAMETER: 0.25 CM
BH CV VAS DIALYSIS LEFT BRANCH 2 DIAMETER: 0.26 CM
BH CV VAS DIALYSIS PRE-INFLOW BRACHIAL DIAMETER: 0.6 CM
BH CV VAS DIALYSIS PRE-INFLOW BRACHIAL EDV: 68.8 CM/SEC
BH CV VAS DIALYSIS PRE-INFLOW BRACHIAL FLOW VOL: 996 ML/MIN
BH CV VAS DIALYSIS PRE-INFLOW BRACHIAL PSV: 172 CM/SEC
BH CV VAS DIALYSIS VENOUS OUTFLOW CEPHALIC ARCH DIAMETE: 0.5 CM
BH CV VAS DIALYSIS VENOUS OUTFLOW CEPHALIC ARCH EDV: 80.9 CM/SEC
BH CV VAS DIALYSIS VENOUS OUTFLOW CEPHALIC ARCH PSV: 133 CM/SEC
BH CV VAS DIALYSIS VENOUS OUTFLOW CEPHALIC VEIN DIAMETE: 0.5 CM
BH CV VAS DIALYSIS VENOUS OUTFLOW CEPHALIC VEIN EDV: 58.9 CM/SEC
BH CV VAS DIALYSIS VENOUS OUTFLOW CEPHALIC VEIN PSV: 113 CM/SEC
BH CV VAS DIALYSIS VENOUS OUTFLOW SUBCL-CEPHALIC JX DIAMETER: 0.3 CM
BH CV VAS DIALYSIS VENOUS OUTFLOW SUBCL-CEPHALIC JX EDV: 97.2 CM/SEC
BH CV VAS DIALYSIS VENOUS OUTFLOW SUBCL-CEPHALIC JX PSV: 154 CM/SEC
BH CV VAS DIALYSIS VENOUS OUTFLOW SUBCLAVIAN DIAMETER: 1.1 CM
BH CV VAS DIALYSIS VENOUS OUTFLOW SUBCLAVIAN EDV: 84.5 CM/SEC
BH CV VAS DIALYSIS VENOUS OUTFLOW SUBCLAVIAN PSV: 137 CM/SEC

## 2025-08-20 PROCEDURE — 93990 DOPPLER FLOW TESTING: CPT

## (undated) DEVICE — ST. SORBAVIEW ULTIMATE IJ SYSTEM A,C: Brand: CENTURION

## (undated) DEVICE — 32 FR RIGHT ANGLE – SOFT PVC CATHETER: Brand: PVC THORACIC CATHETERS

## (undated) DEVICE — SPNG GZ WOVN 4X4IN 12PLY 10/BX STRL

## (undated) DEVICE — SUT PROLN 4/0 BB D/A 36IN 8581H

## (undated) DEVICE — VASOVIEW HEMOPRO: Brand: VASOVIEW HEMOPRO

## (undated) DEVICE — BLOWER/MISTER AXIOUS OPCAB W/TBG

## (undated) DEVICE — PRECISION THIN (9.0 X 0.38 X 25.0MM)

## (undated) DEVICE — TUBING, SUCTION, 1/4" X 10', STRAIGHT: Brand: MEDLINE

## (undated) DEVICE — PK HEART OPN 40

## (undated) DEVICE — LAPAROVUE VISIBILITY SYSTEM LAPAROSCOPIC SOLUTIONS: Brand: LAPAROVUE

## (undated) DEVICE — ENDOPATH PNEUMONEEDLE INSUFFLATION NEEDLES WITH LUER LOCK CONNECTORS 120MM: Brand: ENDOPATH

## (undated) DEVICE — PK ATS CUST W CARDIOTOMY RESEVOIR

## (undated) DEVICE — PK UNIV COMPL 40

## (undated) DEVICE — Device

## (undated) DEVICE — BNDG ELAS ELITE V/CLOSE 4IN 5YD LF STRL

## (undated) DEVICE — SUT SILK 4/0 TIES 18IN A183H

## (undated) DEVICE — DISPOSABLE TOURNIQUET CUFF SINGLE BLADDER, SINGLE PORT AND QUICK CONNECT CONNECTOR: Brand: COLOR CUFF

## (undated) DEVICE — SYR LL TP 10ML STRL

## (undated) DEVICE — BLCK/BITE BLOX W/DENTL/RIM W/STRAP 54F

## (undated) DEVICE — DRAPE,U/ SHT,SPLIT,PLAS,STERIL: Brand: MEDLINE

## (undated) DEVICE — LP VESL MAXI 2.5X1MM RED 2PK

## (undated) DEVICE — GOWN,NON-REINFORCED,SIRUS,SET IN SLV,XL: Brand: MEDLINE

## (undated) DEVICE — SPONGE,LAP,18"X18",DLX,XR,ST,5/PK,40/PK: Brand: MEDLINE

## (undated) DEVICE — TRAP FLD MINIVAC MEGADYNE 100ML

## (undated) DEVICE — ANTIBACTERIAL UNDYED BRAIDED (POLYGLACTIN 910), SYNTHETIC ABSORBABLE SUTURE: Brand: COATED VICRYL

## (undated) DEVICE — PK ORTHO MINOR 40

## (undated) DEVICE — SUT PROLN 6/0 BV1 D/A 30IN 8709H

## (undated) DEVICE — ARM DRAPE

## (undated) DEVICE — KT MANIFLD CARDIAC

## (undated) DEVICE — WEBRIL* CAST PADDING: Brand: DEROYAL

## (undated) DEVICE — GLV SURG BIOGEL M LTX PF 6 1/2

## (undated) DEVICE — RADIFOCUS GLIDEWIRE: Brand: GLIDEWIRE

## (undated) DEVICE — BANDAGE,GAUZE,BULKEE II,4.5"X4.1YD,STRL: Brand: MEDLINE

## (undated) DEVICE — ST TOURNI COMPL A/ 7IN

## (undated) DEVICE — DRSNG WND GEL FIBR OPTICELL AG PLS W/SLV LF 4X5IN  STRL

## (undated) DEVICE — CATH DIAG IMPULSE MPA2 5F 125CM

## (undated) DEVICE — GW EMR FIX EXCHG J STD .035 3MM 260CM

## (undated) DEVICE — PK AV FISTL/SHNT 40

## (undated) DEVICE — VISUALIZATION SYSTEM: Brand: CLEARIFY

## (undated) DEVICE — LOU THORACIC: Brand: MEDLINE INDUSTRIES, INC.

## (undated) DEVICE — 3M™ STERI-STRIP™ COMPOUND BENZOIN TINCTURE 40 BAGS/CARTON 4 CARTONS/CASE C1544: Brand: 3M™ STERI-STRIP™

## (undated) DEVICE — SUT PROLN 2/0 V5 48IN D9694

## (undated) DEVICE — SYR SLP TP 10ML DISP

## (undated) DEVICE — SKIN PREP TRAY 4 COMPARTM TRAY: Brand: MEDLINE INDUSTRIES, INC.

## (undated) DEVICE — BIOPATCH™ ANTIMICROBIAL DRESSING WITH CHLORHEXIDINE GLUCONATE IS A HYDROPHILLIC POLYURETHANE ABSORPTIVE FOAM WITH CHLORHEXIDINE GLUCONATE (CHG) WHICH INHIBITS BACTERIAL GROWTH UNDER THE DRESSING. THE DRESSING IS INTENDED TO BE USED TO ABSORB EXUDATE, COVER A WOUND CAUSED BY VASCULAR AND NONVASCULAR PERCUTANEOUS MEDICAL DEVICES DURING SURGERY, AS WELL AS REDUCE LOCAL INFECTION AND COLONIZATION OF MICROORGANISMS.: Brand: BIOPATCH

## (undated) DEVICE — PENCL SMOKE/EVAC MEGADYNE TELESCP 10FT

## (undated) DEVICE — GLV SURG SENSICARE PI MIC PF SZ7 LF STRL

## (undated) DEVICE — SOL ISO/ALC RUB 70PCT 4OZ

## (undated) DEVICE — LOU LAP CHOLE: Brand: MEDLINE INDUSTRIES, INC.

## (undated) DEVICE — OASIS DRAIN, SINGLE, INLINE & ATS COMPATIBLE: Brand: OASIS

## (undated) DEVICE — SUT PROLN 5/0 RB1 D/A 36IN 8556H

## (undated) DEVICE — APPL CHLORAPREP HI/LITE 26ML ORNG

## (undated) DEVICE — SUT SILK 3/0 SH CR8 18IN C013D

## (undated) DEVICE — DRSNG WND GZ CURAD OIL EMULSION 3X8IN LF STRL 1PK

## (undated) DEVICE — GLV SURG SENSICARE PI LF PF 7.5 GRN STRL

## (undated) DEVICE — SUT MNCRYL PLS ANTIB UD 4/0 PS2 18IN

## (undated) DEVICE — ST TBG AIRSEAL BIF FLTR W/ACT/CHARCOAL/FLTR

## (undated) DEVICE — PAD GRND REM POLYHESIVE A/ DISP

## (undated) DEVICE — EXOFIN PRECISION PEN HIGH VISCOSITY TOPICAL SKIN ADHESIVE: Brand: EXOFIN PRECISION PEN, 1G

## (undated) DEVICE — SUT SILK 2/0 TIES 18IN A185H

## (undated) DEVICE — STPLR SKIN VISISTAT WD 35CT

## (undated) DEVICE — CLAMP INSERT: Brand: STEALTH® CLAMP INSERT

## (undated) DEVICE — CVR PROB 96IN LF STRL

## (undated) DEVICE — KT EXP CATH ONQ SILVERSOAKER 5IN

## (undated) DEVICE — GAUZE,SPONGE,FLUFF,6"X6.75",STRL,10/TRAY: Brand: MEDLINE

## (undated) DEVICE — PATIENT RETURN ELECTRODE, SINGLE-USE, CONTACT QUALITY MONITORING, ADULT, WITH 9FT CORD, FOR PATIENTS WEIGING OVER 33LBS. (15KG): Brand: MEGADYNE

## (undated) DEVICE — ADHS SKIN DERMABOND TOP ADVANCED

## (undated) DEVICE — SCANLAN® SUTURE BOOT™ INSTRUMENT JAW COVERS - ORIGINAL YELLOW, STANDARD PKG (5 PAIR/CARTRIDGE, 1 CARTRIDGE/PKG): Brand: SCANLAN® SUTURE BOOT™ INSTRUMENT JAW COVERS

## (undated) DEVICE — IMMOB KN 3PNL DLX CANVS 22IN BLU

## (undated) DEVICE — BANDAGE,GAUZE,CONFORMING,4"X75",STRL,LF: Brand: MEDLINE

## (undated) DEVICE — GLV SURG BIOGEL LTX PF 7

## (undated) DEVICE — ADHS SKIN SURG TISS VISC PREMIERPRO EXOFIN HI/VISC FAST/DRY

## (undated) DEVICE — SUT VIC 2/0 CT2 27IN J269H

## (undated) DEVICE — ST ACC MICROPUNCTURE STFF .018 ECHO/PLAT/TP 4F/10CM 21G/7CM

## (undated) DEVICE — SEAL

## (undated) DEVICE — COVER,MAYO STAND,STERILE: Brand: MEDLINE

## (undated) DEVICE — VESSEL LOOPS X-RAY DETECTABLE: Brand: DEROYAL

## (undated) DEVICE — SUT SILK 0 PSL 18IN 580H

## (undated) DEVICE — KT ORCA ORCAPOD DISP STRL

## (undated) DEVICE — SPNG LAP 18X18IN LF STRL PK/5

## (undated) DEVICE — CATH DIAG IMPULSE FL3.5 5F 100CM

## (undated) DEVICE — TUNNELER W/SHEATH 11GA 8IN

## (undated) DEVICE — STERILE ULTRASOUND GEL, SAFEWRAP: Brand: ECOVUE

## (undated) DEVICE — ENCORE® LATEX ORTHO SIZE 8, STERILE LATEX POWDER-FREE SURGICAL GLOVE: Brand: ENCORE

## (undated) DEVICE — TROC BLADLES AIRSEAL/OPTI THRD 8X120MM 1P/U

## (undated) DEVICE — ENDOPATH 10MM ENDOSCOPIC BLUNT CHERRY DISSECTORS (12 POUCHES CONTAINING 3 DISSECTORS EACH): Brand: ENDOPATH

## (undated) DEVICE — SENSR O2 OXIMAX FNGR A/ 18IN NONSTR

## (undated) DEVICE — ROTATING SURGICAL PUNCHES, 1 PER POUCH: Brand: A&E MEDICAL / ROTATING SURGICAL PUNCHES

## (undated) DEVICE — SYS PERFUS SEP PLATLT W TIPS CUST

## (undated) DEVICE — SYRINGE,TOOMEY,IRRIGATION,70CC,STERILE: Brand: MEDLINE

## (undated) DEVICE — DECANTER BAG 9": Brand: MEDLINE INDUSTRIES, INC.

## (undated) DEVICE — 3M™ IOBAN™ 2 ANTIMICROBIAL INCISE DRAPE 6650EZ: Brand: IOBAN™ 2

## (undated) DEVICE — GLV SURG TRIUMPH CLASSIC PF LTX 8.5 STRL

## (undated) DEVICE — ADAPT CLN BIOGUARD AIR/H2O DISP

## (undated) DEVICE — 1LYRTR 16FR10ML100%SIL UMS SNP: Brand: MEDLINE INDUSTRIES, INC.

## (undated) DEVICE — GOWN,SIRUS,NONRNF,SETINSLV,2XL,18/CS: Brand: MEDLINE

## (undated) DEVICE — APPL CHLORAPREP W/TINT 26ML ORNG

## (undated) DEVICE — CATH DIAG IMPULSE FR4 5F 100CM

## (undated) DEVICE — PK ORTHO MAJ 40

## (undated) DEVICE — CATH VENT DLP W/CONN MALL NOVNT SILICON 16FR 16IN

## (undated) DEVICE — CORONARY ARTERY BYPASS GRAFT MARKERS, STAINLESS STEEL, DISTAL, WITHOUT HOLDER: Brand: ANASTOMARK CORONARY ARTERY BYPASS GRAFT MARKERS, STAINLESS STEEL, DISTAL

## (undated) DEVICE — UNDERCAST PADDING: Brand: DEROYAL

## (undated) DEVICE — SYR LUERLOK 5CC

## (undated) DEVICE — LOU CYSTO: Brand: MEDLINE INDUSTRIES, INC.

## (undated) DEVICE — SUT PROLN MO.5 7/0 DBLARM BV175 6 2X30 BX/12

## (undated) DEVICE — 28 FR STRAIGHT – SOFT PVC CATHETER: Brand: PVC THORACIC CATHETERS

## (undated) DEVICE — BNDG ELAS ELITE V/CLOSE 6IN 5YD LF STRL

## (undated) DEVICE — MEDICINE CUP, GRADUATED, STER: Brand: MEDLINE

## (undated) DEVICE — MSK PROC CURAPLEX O2 2/ADAPT 7FT

## (undated) DEVICE — STRIP PACKING W IODOFORM 1/4

## (undated) DEVICE — VLV AORT AVALUS BIOPROSTHESIS 25MM
Type: IMPLANTABLE DEVICE | Site: HEART | Status: NON-FUNCTIONAL
Removed: 2018-07-23

## (undated) DEVICE — Device: Brand: OLYMPUS

## (undated) DEVICE — MAT FLR ABS LIQUILOC 28X56IN PNK

## (undated) DEVICE — BNDG ELAS CO-FLEX SLF ADHR 4IN5YD LF STRL

## (undated) DEVICE — DRSNG SURESITE WNDW 2.38X2.75

## (undated) DEVICE — SENSR CERBRL O2 PK/2

## (undated) DEVICE — TIP COVER ACCESSORY

## (undated) DEVICE — 12 FOOT DISPOSABLE EXTENSION CABLE WITH SAFE CONNECT / SCREW-DOWN

## (undated) DEVICE — DRSNG BRDR MEPILEX P/OP SIL 4X12IN

## (undated) DEVICE — SYR LUERLOK 20CC BX/50

## (undated) DEVICE — SUT SILK 3/0 TIES 18IN A184H

## (undated) DEVICE — TIDISHIELD UROLOGY DRAIN BAGS FROSTY VINYL STERILE FITS SIEMENS UROSKOP ACCESS 20 PER CASE: Brand: TIDISHIELD

## (undated) DEVICE — GLIDESHEATH BASIC HYDROPHILIC COATED INTRODUCER SHEATH: Brand: GLIDESHEATH

## (undated) DEVICE — CATH FOL SIMPLASTIC 3WY 22F 30CC

## (undated) DEVICE — Device: Brand: LEVEL 1

## (undated) DEVICE — CANN ART EOPA 3D NV W/CONN 22F

## (undated) DEVICE — T-DRAPE,EXTREMITY,STERILE: Brand: MEDLINE

## (undated) DEVICE — HEMOCONCENTRATOR PERFUS LPS06

## (undated) DEVICE — SOL NS 500ML

## (undated) DEVICE — SUT SILK 0 CT1 CR8 18IN C021D

## (undated) DEVICE — HEWSON SUTURE RETRIEVER: Brand: HEWSON SUTURE RETRIEVER

## (undated) DEVICE — GLV SURG BIOGEL LTX PF 8 1/2

## (undated) DEVICE — SKIN PREP TRAY W/CHG: Brand: MEDLINE INDUSTRIES, INC.

## (undated) DEVICE — THE STERILE LIGHT HANDLE COVER IS USED WITH STERIS SURGICAL LIGHTING AND VISUALIZATION SYSTEMS.

## (undated) DEVICE — PK ORTHO MINOR TOWER 40

## (undated) DEVICE — CATH DIAG IMPULSE AL1 5F 100CM

## (undated) DEVICE — DRESSING,GAUZE,XEROFORM,CURAD,1"X8",ST: Brand: CURAD

## (undated) DEVICE — INSUFFLATION TUBING,LAPAROSCOPIC: Brand: DEROYAL

## (undated) DEVICE — STOCKINETTE,IMPERVIOUS,12X48,STERILE: Brand: MEDLINE

## (undated) DEVICE — PK CATH CARD 40

## (undated) DEVICE — GLV SURG PREMIERPRO ORTHO LTX PF SZ7 BRN

## (undated) DEVICE — COLUMN DRAPE

## (undated) DEVICE — VIOLET BRAIDED (POLYGLACTIN 910), SYNTHETIC ABSORBABLE SUTURE: Brand: COATED VICRYL

## (undated) DEVICE — NDL HYPO PRECISIONGLIDE REG 25G 1 1/2

## (undated) DEVICE — SUT ETHLN 2/0 PS 18IN 585H

## (undated) DEVICE — GLV SURG SIGNATURE ESSENTIAL PF LTX SZ7.5

## (undated) DEVICE — KT CATH TAL PALINDROME SAPPHIRE 14.5F23CM

## (undated) DEVICE — GLV SURG BIOGEL M LTX PF 7 1/2

## (undated) DEVICE — BNDG ESMARK STRL 6INX12FT LF

## (undated) DEVICE — 3M™ STERI-STRIP™ REINFORCED ADHESIVE SKIN CLOSURES, R1547, 1/2 IN X 4 IN (12 MM X 100 MM), 6 STRIPS/ENVELOPE: Brand: 3M™ STERI-STRIP™

## (undated) DEVICE — CATH VENT MIV RADL PIG ST TIP 4F 110CM

## (undated) DEVICE — TAPE MICROFM 2IN LF

## (undated) DEVICE — SUT PROLN 5/0 C1 18IN 8717H

## (undated) DEVICE — LN SMPL CO2 SHTRM SD STREAM W/M LUER

## (undated) DEVICE — HARMONIC SYNERGY DISSECTING HOOK WITH TORQUE WRENCH. FOR USE WITH BLUE HAND PIECE ONLY: Brand: HARMONIC SYNERGY

## (undated) DEVICE — APPL DURAPREP IODOPHOR APL 26ML

## (undated) DEVICE — LOU OPEN HEART DR POLLOCK: Brand: MEDLINE INDUSTRIES, INC.

## (undated) DEVICE — PK PERFUS CUST W/CARDIOPLEGIA

## (undated) DEVICE — FASTNR CATH PERC 5 TO12FR

## (undated) DEVICE — EXTENSION SET, MALE LUER LOCK ADAPTER WITH RETRACTABLE COLLAR

## (undated) DEVICE — 32 FR STRAIGHT – SOFT PVC CATHETER: Brand: PVC THORACIC CATHETERS

## (undated) DEVICE — TOTAL TRAY, 16FR 10ML SIL FOLEY, URN: Brand: MEDLINE

## (undated) DEVICE — BLADELESS OBTURATOR: Brand: WECK VISTA

## (undated) DEVICE — SUT PROLN 3/0 SH D/A 36IN 8522H